# Patient Record
Sex: FEMALE | Race: WHITE | NOT HISPANIC OR LATINO | URBAN - METROPOLITAN AREA
[De-identification: names, ages, dates, MRNs, and addresses within clinical notes are randomized per-mention and may not be internally consistent; named-entity substitution may affect disease eponyms.]

---

## 2018-08-20 ENCOUNTER — INPATIENT (INPATIENT)
Facility: HOSPITAL | Age: 58
LOS: 2 days | Discharge: HOME | DRG: 871 | End: 2018-08-23
Attending: FAMILY MEDICINE | Admitting: FAMILY MEDICINE
Payer: MEDICARE

## 2018-08-20 VITALS
DIASTOLIC BLOOD PRESSURE: 74 MMHG | TEMPERATURE: 101 F | RESPIRATION RATE: 18 BRPM | SYSTOLIC BLOOD PRESSURE: 126 MMHG | HEIGHT: 69 IN | HEART RATE: 120 BPM | OXYGEN SATURATION: 96 % | WEIGHT: 134.04 LBS

## 2018-08-20 DIAGNOSIS — J98.4 OTHER DISORDERS OF LUNG: ICD-10-CM

## 2018-08-20 DIAGNOSIS — I34.1 NONRHEUMATIC MITRAL (VALVE) PROLAPSE: ICD-10-CM

## 2018-08-20 DIAGNOSIS — Z29.9 ENCOUNTER FOR PROPHYLACTIC MEASURES, UNSPECIFIED: ICD-10-CM

## 2018-08-20 DIAGNOSIS — J18.9 PNEUMONIA, UNSPECIFIED ORGANISM: ICD-10-CM

## 2018-08-20 DIAGNOSIS — Z90.49 ACQUIRED ABSENCE OF OTHER SPECIFIED PARTS OF DIGESTIVE TRACT: Chronic | ICD-10-CM

## 2018-08-20 DIAGNOSIS — A41.9 SEPSIS, UNSPECIFIED ORGANISM: ICD-10-CM

## 2018-08-20 DIAGNOSIS — G45.9 TRANSIENT CEREBRAL ISCHEMIC ATTACK, UNSPECIFIED: ICD-10-CM

## 2018-08-20 DIAGNOSIS — J22 UNSPECIFIED ACUTE LOWER RESPIRATORY INFECTION: ICD-10-CM

## 2018-08-20 LAB
ALBUMIN SERPL ELPH-MCNC: 3.3 G/DL — SIGNIFICANT CHANGE UP (ref 3.3–5)
ALP SERPL-CCNC: 115 U/L — SIGNIFICANT CHANGE UP (ref 40–120)
ALT FLD-CCNC: 21 U/L — SIGNIFICANT CHANGE UP (ref 12–78)
ANION GAP SERPL CALC-SCNC: 9 MMOL/L — SIGNIFICANT CHANGE UP (ref 5–17)
APPEARANCE UR: CLEAR — SIGNIFICANT CHANGE UP
APTT BLD: 33.3 SEC — SIGNIFICANT CHANGE UP (ref 27.5–37.4)
AST SERPL-CCNC: 24 U/L — SIGNIFICANT CHANGE UP (ref 15–37)
BACTERIA # UR AUTO: ABNORMAL
BASOPHILS # BLD AUTO: 0.05 K/UL — SIGNIFICANT CHANGE UP (ref 0–0.2)
BASOPHILS NFR BLD AUTO: 0.4 % — SIGNIFICANT CHANGE UP (ref 0–2)
BILIRUB SERPL-MCNC: 0.4 MG/DL — SIGNIFICANT CHANGE UP (ref 0.2–1.2)
BILIRUB UR-MCNC: NEGATIVE — SIGNIFICANT CHANGE UP
BUN SERPL-MCNC: 10 MG/DL — SIGNIFICANT CHANGE UP (ref 7–23)
CALCIUM SERPL-MCNC: 9.5 MG/DL — SIGNIFICANT CHANGE UP (ref 8.5–10.1)
CHLORIDE SERPL-SCNC: 99 MMOL/L — SIGNIFICANT CHANGE UP (ref 96–108)
CO2 SERPL-SCNC: 28 MMOL/L — SIGNIFICANT CHANGE UP (ref 22–31)
COLOR SPEC: YELLOW — SIGNIFICANT CHANGE UP
CREAT SERPL-MCNC: 0.96 MG/DL — SIGNIFICANT CHANGE UP (ref 0.5–1.3)
DIFF PNL FLD: ABNORMAL
EOSINOPHIL # BLD AUTO: 0.02 K/UL — SIGNIFICANT CHANGE UP (ref 0–0.5)
EOSINOPHIL NFR BLD AUTO: 0.1 % — SIGNIFICANT CHANGE UP (ref 0–6)
EPI CELLS # UR: SIGNIFICANT CHANGE UP
GLUCOSE SERPL-MCNC: 124 MG/DL — HIGH (ref 70–99)
GLUCOSE UR QL: NEGATIVE — SIGNIFICANT CHANGE UP
HCT VFR BLD CALC: 39.8 % — SIGNIFICANT CHANGE UP (ref 34.5–45)
HGB BLD-MCNC: 13.5 G/DL — SIGNIFICANT CHANGE UP (ref 11.5–15.5)
IMM GRANULOCYTES NFR BLD AUTO: 0.6 % — SIGNIFICANT CHANGE UP (ref 0–1.5)
INR BLD: 1.2 RATIO — HIGH (ref 0.88–1.16)
KETONES UR-MCNC: NEGATIVE — SIGNIFICANT CHANGE UP
LACTATE SERPL-SCNC: 0.8 MMOL/L — SIGNIFICANT CHANGE UP (ref 0.7–2)
LACTATE SERPL-SCNC: 1.5 MMOL/L — SIGNIFICANT CHANGE UP (ref 0.7–2)
LEUKOCYTE ESTERASE UR-ACNC: ABNORMAL
LYMPHOCYTES # BLD AUTO: 1 K/UL — SIGNIFICANT CHANGE UP (ref 1–3.3)
LYMPHOCYTES # BLD AUTO: 7 % — LOW (ref 13–44)
MCHC RBC-ENTMCNC: 31.6 PG — SIGNIFICANT CHANGE UP (ref 27–34)
MCHC RBC-ENTMCNC: 33.9 GM/DL — SIGNIFICANT CHANGE UP (ref 32–36)
MCV RBC AUTO: 93.2 FL — SIGNIFICANT CHANGE UP (ref 80–100)
MONOCYTES # BLD AUTO: 1.37 K/UL — HIGH (ref 0–0.9)
MONOCYTES NFR BLD AUTO: 9.6 % — SIGNIFICANT CHANGE UP (ref 2–14)
NEUTROPHILS # BLD AUTO: 11.71 K/UL — HIGH (ref 1.8–7.4)
NEUTROPHILS NFR BLD AUTO: 82.3 % — HIGH (ref 43–77)
NITRITE UR-MCNC: NEGATIVE — SIGNIFICANT CHANGE UP
NRBC # BLD: 0 /100 WBCS — SIGNIFICANT CHANGE UP (ref 0–0)
NT-PROBNP SERPL-SCNC: 390 PG/ML — HIGH (ref 0–125)
PH UR: 5 — SIGNIFICANT CHANGE UP (ref 5–8)
PLATELET # BLD AUTO: 318 K/UL — SIGNIFICANT CHANGE UP (ref 150–400)
POTASSIUM SERPL-MCNC: 4.1 MMOL/L — SIGNIFICANT CHANGE UP (ref 3.5–5.3)
POTASSIUM SERPL-SCNC: 4.1 MMOL/L — SIGNIFICANT CHANGE UP (ref 3.5–5.3)
PROCALCITONIN SERPL-MCNC: 0.05 NG/ML — HIGH (ref 0–0.04)
PROT SERPL-MCNC: 8.4 G/DL — HIGH (ref 6–8.3)
PROT UR-MCNC: 25 MG/DL
PROTHROM AB SERPL-ACNC: 13.1 SEC — HIGH (ref 9.8–12.7)
RBC # BLD: 4.27 M/UL — SIGNIFICANT CHANGE UP (ref 3.8–5.2)
RBC # FLD: 13.2 % — SIGNIFICANT CHANGE UP (ref 10.3–14.5)
RBC CASTS # UR COMP ASSIST: SIGNIFICANT CHANGE UP /HPF (ref 0–4)
SODIUM SERPL-SCNC: 136 MMOL/L — SIGNIFICANT CHANGE UP (ref 135–145)
SP GR SPEC: 1.01 — SIGNIFICANT CHANGE UP (ref 1.01–1.02)
TSH SERPL-MCNC: 0.77 UIU/ML — SIGNIFICANT CHANGE UP (ref 0.36–3.74)
UROBILINOGEN FLD QL: NEGATIVE — SIGNIFICANT CHANGE UP
WBC # BLD: 14.24 K/UL — HIGH (ref 3.8–10.5)
WBC # FLD AUTO: 14.24 K/UL — HIGH (ref 3.8–10.5)
WBC UR QL: SIGNIFICANT CHANGE UP

## 2018-08-20 PROCEDURE — 71046 X-RAY EXAM CHEST 2 VIEWS: CPT | Mod: 26

## 2018-08-20 PROCEDURE — 93010 ELECTROCARDIOGRAM REPORT: CPT

## 2018-08-20 PROCEDURE — 99285 EMERGENCY DEPT VISIT HI MDM: CPT

## 2018-08-20 PROCEDURE — 99223 1ST HOSP IP/OBS HIGH 75: CPT | Mod: GC,AI

## 2018-08-20 RX ORDER — ENOXAPARIN SODIUM 100 MG/ML
40 INJECTION SUBCUTANEOUS DAILY
Qty: 0 | Refills: 0 | Status: DISCONTINUED | OUTPATIENT
Start: 2018-08-20 | End: 2018-08-23

## 2018-08-20 RX ORDER — CEFTRIAXONE 500 MG/1
1 INJECTION, POWDER, FOR SOLUTION INTRAMUSCULAR; INTRAVENOUS ONCE
Qty: 0 | Refills: 0 | Status: COMPLETED | OUTPATIENT
Start: 2018-08-20 | End: 2018-08-20

## 2018-08-20 RX ORDER — AZITHROMYCIN 500 MG/1
500 TABLET, FILM COATED ORAL ONCE
Qty: 0 | Refills: 0 | Status: COMPLETED | OUTPATIENT
Start: 2018-08-20 | End: 2018-08-20

## 2018-08-20 RX ORDER — CEFTRIAXONE 500 MG/1
1 INJECTION, POWDER, FOR SOLUTION INTRAMUSCULAR; INTRAVENOUS EVERY 24 HOURS
Qty: 0 | Refills: 0 | Status: DISCONTINUED | OUTPATIENT
Start: 2018-08-20 | End: 2018-08-23

## 2018-08-20 RX ORDER — ACETAMINOPHEN 500 MG
650 TABLET ORAL EVERY 6 HOURS
Qty: 0 | Refills: 0 | Status: DISCONTINUED | OUTPATIENT
Start: 2018-08-20 | End: 2018-08-23

## 2018-08-20 RX ORDER — AZITHROMYCIN 500 MG/1
500 TABLET, FILM COATED ORAL EVERY 24 HOURS
Qty: 0 | Refills: 0 | Status: DISCONTINUED | OUTPATIENT
Start: 2018-08-20 | End: 2018-08-23

## 2018-08-20 RX ORDER — ACETAMINOPHEN 500 MG
650 TABLET ORAL ONCE
Qty: 0 | Refills: 0 | Status: COMPLETED | OUTPATIENT
Start: 2018-08-20 | End: 2018-08-20

## 2018-08-20 RX ORDER — ASPIRIN/CALCIUM CARB/MAGNESIUM 324 MG
81 TABLET ORAL DAILY
Qty: 0 | Refills: 0 | Status: DISCONTINUED | OUTPATIENT
Start: 2018-08-20 | End: 2018-08-23

## 2018-08-20 RX ORDER — SODIUM CHLORIDE 9 MG/ML
1000 INJECTION INTRAMUSCULAR; INTRAVENOUS; SUBCUTANEOUS ONCE
Qty: 0 | Refills: 0 | Status: COMPLETED | OUTPATIENT
Start: 2018-08-20 | End: 2018-08-20

## 2018-08-20 RX ORDER — ALBUTEROL 90 UG/1
2.5 AEROSOL, METERED ORAL EVERY 12 HOURS
Qty: 0 | Refills: 0 | Status: DISCONTINUED | OUTPATIENT
Start: 2018-08-20 | End: 2018-08-23

## 2018-08-20 RX ADMIN — SODIUM CHLORIDE 1000 MILLILITER(S): 9 INJECTION INTRAMUSCULAR; INTRAVENOUS; SUBCUTANEOUS at 18:00

## 2018-08-20 RX ADMIN — Medication 650 MILLIGRAM(S): at 17:09

## 2018-08-20 RX ADMIN — AZITHROMYCIN 500 MILLIGRAM(S): 500 TABLET, FILM COATED ORAL at 18:00

## 2018-08-20 RX ADMIN — Medication 650 MILLIGRAM(S): at 19:35

## 2018-08-20 RX ADMIN — SODIUM CHLORIDE 1000 MILLILITER(S): 9 INJECTION INTRAMUSCULAR; INTRAVENOUS; SUBCUTANEOUS at 17:09

## 2018-08-20 RX ADMIN — CEFTRIAXONE 100 GRAM(S): 500 INJECTION, POWDER, FOR SOLUTION INTRAMUSCULAR; INTRAVENOUS at 18:00

## 2018-08-20 RX ADMIN — AZITHROMYCIN 255 MILLIGRAM(S): 500 TABLET, FILM COATED ORAL at 17:09

## 2018-08-20 RX ADMIN — ALBUTEROL 2.5 MILLIGRAM(S): 90 AEROSOL, METERED ORAL at 19:41

## 2018-08-20 NOTE — H&P ADULT - HISTORY OF PRESENT ILLNESS
57 yo F with PMHx of MVP, unspecified chronic lung disease, non-hodgkin lymphoma s/p chemotherapy presents to ED due to productive cough, fever and SOB. Patient states that 5 days ago she began having chills, dry cough, tachycardia and fever (101). Patient treated her fever with tylenol q6 but other symptoms persisted despite home made prepared tea with pop, thyme and honey. Finally, today she visited her PCP who referred her to [facility] for work up of bacterial pneumonia. Currently patient admits of constant SOB, palpitations, tachycardia, myalgias, productive cough with no hemoptysis, weakness, dizziness with no syncope episodes, nausea with no vomiting and decreased appetite. Patient denies current chest pain, abdominal pain, recent weight loss or night sweats. Patient reports she gets SOB walking up 1 flight of stairs since 1 1/2 year ago, accompanied by chest pain. Chest pain     In ED vitals are T: 101  /74 RR 18 sat at 96 on RA. WBC 14.24 H/H 13.5/39.8 PT- 13.1 INR-1.20 aPTT 33.3 Procalcitonin- 0.05 Glucose 124 Tot. protein 8.4 BNP- 390. CXR 	Bilateral upper lobe volume loss with hilar retraction; cannot exclude underlying hilar mass/lymphadenopathy. Prominent bilateral reticulonodular interstitial markings may reflect chronic lung disease.  Recommend comparison with prior chest x-ray.

## 2018-08-20 NOTE — H&P ADULT - PROBLEM SELECTOR PLAN 5
TIA as per history 3 years ago   ASA 81 mg chronic, stable  TIA as per history 3 years ago   ASA 81 mg

## 2018-08-20 NOTE — H&P ADULT - NSHPOUTPATIENTPROVIDERS_GEN_ALL_CORE
Pulmonologist: Dr. Bettye Hazel   PCP: Dr. Josef Ventura   Cardiologist: Dr. Gadiel Ventura Pulmonologist: Dr. Bettye Hazel   PCP: Dr. Joselito Lemon   Cardiologist: Dr. Gadiel Ventura

## 2018-08-20 NOTE — H&P ADULT - PROBLEM SELECTOR PLAN 1
Sepsis likely 2/2 to community acquired pneumonia   WBC 14.24 Pro-calcitonin 0.05 Fever 101 , abnormal CXR   CBC w/ diff   FU BC, UC, sputum culture, strep ag and legionella ag, lactate    Continue empiric antibiotics with Rochephin and Zithro IV   Admit to MED   Monitor vs and HD and Sat  Tylenol PRN fever   Fall precautions   Consult ID

## 2018-08-20 NOTE — H&P ADULT - ASSESSMENT
59 yo F with PMHx of MVP, unspecified chronic lung disease, non-hodgkin lymphoma s/p chemotherapy, TIA 3 years ago presents to ED due to productive cough, fever and SOB. 57 yo F with PMHx of MVP, unspecified chronic lung disease, non-hodgkin lymphoma s/p chemotherapy, TIA 3 years ago presents to ED due to productive cough, fever and SOB admitted with sepsis sec to CAP and chest pain r/o acs.

## 2018-08-20 NOTE — ED ADULT NURSE NOTE - NSIMPLEMENTINTERV_GEN_ALL_ED
Implemented All Universal Safety Interventions:  [place] to call system. Call bell, personal items and telephone within reach. Instruct patient to call for assistance. Room bathroom lighting operational. Non-slip footwear when patient is off stretcher. Physically safe environment: no spills, clutter or unnecessary equipment. Stretcher in lowest position, wheels locked, appropriate side rails in place.

## 2018-08-20 NOTE — H&P ADULT - NSHPPHYSICALEXAM_GEN_ALL_CORE
Vital Signs Last 24 Hrs  T(C): 37.2 (20 Aug 2018 19:59), Max: 38.3 (20 Aug 2018 15:54)  T(F): 99 (20 Aug 2018 19:59), Max: 101 (20 Aug 2018 15:54)  HR: 97 (20 Aug 2018 19:59) (97 - 120)  BP: 127/62 (20 Aug 2018 19:59) (112/74 - 127/62)  BP(mean): --  RR: 18 (20 Aug 2018 19:59) (18 - 20)  SpO2: 95% (20 Aug 2018 19:59) (95% - 96%)  Physical Exam:  General: Not in acute distress, appeared somnolent.  HEENT: NCAT, PERRLA, EOMI bl, moist mucous membranes   Neck: Supple, nontender, no mass  Neurology: A&Ox3, nonfocal, CN II-XII grossly intact  Respiratory: Decreased breath sound in lower lung fields   CV: RRR, +S1/S2, no murmurs, rubs or gallops  Abdominal: Soft, NT, ND +BSx4  Extremities: No C/C/E, + peripheral pulses  Skin: warm, dry, normal color Vital Signs Last 24 Hrs  T(C): 37.2 (20 Aug 2018 19:59), Max: 38.3 (20 Aug 2018 15:54)  T(F): 99 (20 Aug 2018 19:59), Max: 101 (20 Aug 2018 15:54)  HR: 97 (20 Aug 2018 19:59) (97 - 120)  BP: 127/62 (20 Aug 2018 19:59) (112/74 - 127/62)  BP(mean): --  RR: 18 (20 Aug 2018 19:59) (18 - 20)  SpO2: 95% (20 Aug 2018 19:59) (95% - 96%)  Physical Exam:  General: Not in acute distress, appears slightly somnolent  HEENT: NCAT, PERRLA, EOMI bl, moist mucous membranes   Neck: Supple, nontender, no mass  Neurology: A&Ox3, nonfocal, CN II-XII grossly intact  Respiratory: Decreased breath sound in lower lung fields   CV: RRR, +S1/S2, no murmurs, rubs or gallops  Abdominal: Soft, NT, ND +BSx4  Extremities: No C/C/E, + peripheral pulses  Skin: warm, dry, normal color

## 2018-08-20 NOTE — H&P ADULT - PMH
Mitral prolapse    Pulmonary disease Mitral prolapse    Pulmonary disease    Transient cerebral ischemia, unspecified type

## 2018-08-20 NOTE — ED PROVIDER NOTE - OBJECTIVE STATEMENT
pt referred by pmd for 5 days of sob, cough and fever. no ha, cp, abd pain, d/n/v, edema, calf pain, travel  pmd - last  pul - [facility]

## 2018-08-20 NOTE — ED ADULT NURSE NOTE - OBJECTIVE STATEMENT
pt to ER with co cough and fever. lungs diminished with nonproductive cough. skin warm and dry pt reports ongoing pulmonary symptoms and is being followed with pulmonologist. O2 sat WNL family at bedside comfort and safety ensured will continue to monitor

## 2018-08-20 NOTE — CONSULT NOTE ADULT - SUBJECTIVE AND OBJECTIVE BOX
Date/Time Patient Seen:  		  Referring MD:   Data Reviewed	       Patient is a 58y old  Female who presents with a chief complaint of     Subjective/HPI    vs and meds reviewed  fever  cough  sob  aleman  pt has chronic lung disease, cannot elaborate  pt sees Dr. Bettye Hazel in [place] for her chronic lung disease, she has been seeing a pulm specialist for a year, but is not certain of her diagnosis  pt has hx of NHL - cured as per patient, s/p Chemo, never radiated    lives at home with  and daughter    ex smoker    non drinker     ED Provider Note [Charted Location: \[facility]\"" ED] [Authored: 20-Aug-2018 16:26]- for Visit: [id number], Complete, Revised, Signed in Full, General    HISTORY OF PRESENT ILLNESS:    High Risk Travel:  International Travel? No(1)    · Chief Complaint: The patient is a 58y Female complaining of cough.  · HPI Objective Statement: pt referred by pmd for 5 days of sob, cough and fever. no ha, cp, abd pain, d/n/v, edema, calf pain, travel  	pmd - last  pulm - [facility]  · Presenting Symptoms: COUGH, FEVER, SHORTNESS OF BREATH  · Negative Findings: no chest pain, no chills, no edema, no headache, no wheezing  · Highest Temperature: fahrenheit  101.5  · Timing: constant  · Duration: day(s)  5  · Quality: alteration in breathing pattern, productive cough       PAST MEDICAL & SURGICAL HISTORY:  Mitral prolapse  Pulmonary disease        Medication list         MEDICATIONS  (STANDING):  ALBUTerol    0.083% 2.5 milliGRAM(s) Nebulizer every 12 hours  cefTRIAXone   IVPB 1 Gram(s) IV Intermittent Once    MEDICATIONS  (PRN):  acetaminophen   Tablet 650 milliGRAM(s) Oral every 6 hours PRN For Temp greater than 38 C (100.4 F)  guaiFENesin  milliGRAM(s) Oral every 12 hours PRN Cough         Vitals log        ICU Vital Signs Last 24 Hrs  T(C): 38.3 (20 Aug 2018 15:54), Max: 38.3 (20 Aug 2018 15:54)  T(F): 101 (20 Aug 2018 15:54), Max: 101 (20 Aug 2018 15:54)  HR: 120 (20 Aug 2018 15:54) (120 - 120)  BP: 126/74 (20 Aug 2018 15:54) (126/74 - 126/74)  BP(mean): --  ABP: --  ABP(mean): --  RR: 18 (20 Aug 2018 15:54) (18 - 18)  SpO2: 96% (20 Aug 2018 15:54) (96% - 96%)           Input and Output:  I&O's Detail      Lab Data                        13.5   14.24 )-----------( 318      ( 20 Aug 2018 16:44 )             39.8     08-20    136  |  99  |  10  ----------------------------<  124<H>  4.1   |  28  |  0.96    Ca    9.5      20 Aug 2018 16:44    TPro  8.4<H>  /  Alb  3.3  /  TBili  0.4  /  DBili  x   /  AST  24  /  ALT  21  /  AlkPhos  115  08-20            Review of Systems	      Objective     Physical Examination    heart s1s2  lung dec BS  abd soft  cn grossly int  room air sat 94 pct      Pertinent Lab findings & Imaging      Shauna:  NO   Adequate UO     I&O's Detail           Discussed with:     Cultures:	        Radiology

## 2018-08-20 NOTE — H&P ADULT - PROBLEM SELECTOR PLAN 3
Unspecified Chronic lung disease followed by Dr. Bettye Hazel in [facility] call for old records   Abx, nebs, o2 as needed, keep sat > 88 pct  Incentive spirometry   Pneumologist recs appreciated

## 2018-08-20 NOTE — CONSULT NOTE ADULT - PROBLEM SELECTOR RECOMMENDATION 9
lower resp tract infection  leukocytosis and abnormal CXR plus fever and cough  emp ABX  Rocephin and Zithro IV  will add Albuterol NEBS BID for mucociliary clearance  tylenol PRN for fever  Mucinex PRN for cough expectoration  monitor vs and HD and Sat  will check strep ag and legionella ag, crp and procalcitonin and sputum Cx

## 2018-08-20 NOTE — H&P ADULT - FAMILY HISTORY
Mother  Still living? Unknown  Family history of breast cancer, Age at diagnosis: Age Unknown     Father  Still living? No  Family history of stroke, Age at diagnosis: Age Unknown

## 2018-08-20 NOTE — ED PROVIDER NOTE - CARE PLAN
Principal Discharge DX:	Fever  Secondary Diagnosis:	Cough Principal Discharge DX:	PNA (pneumonia)  Secondary Diagnosis:	Cough

## 2018-08-20 NOTE — H&P ADULT - PROBLEM SELECTOR PLAN 6
IMPROVE VTE Individual Risk Assessment        RISK                                                          Points  [  ] Previous VTE                                                3  [  ] Thrombophilia                                             2  [  ] Lower limb paralysis                                   2        (unable to hold up >15 seconds)    [  ] Current Cancer                                            2         (within 6 months)  [  ] Immobilization > 24 hrs                              1  [  ] ICU/CCU stay > 24 hours                            1  [ x ] Age > 60                                                    1  IMPROVE VTE Score 1   Lovenox 40 mg sub q daily for DVT prophylaxis  Fall precautions and ambulate with assistance

## 2018-08-20 NOTE — H&P ADULT - NSHPREVIEWOFSYSTEMS_GEN_ALL_CORE
Constitutional: denies fever, chills, diaphoresis   HEENT: denies blurry vision, difficulty hearing  Respiratory: denies SOB, DARNELL, cough, sputum production, wheezing, hemoptysis  Cardiovascular: denies chest pain, palpitations, edema  Gastrointestinal: denies nausea, vomiting, diarrhea, constipation, abdominal pain, melena, hematochezia   Genitourinary: denies dysuria, frequency, urgency, hematuria   Skin/Breast: denies rash, itching  Musculoskeletal: denies myalgias, joint swelling, muscle weakness  Neurologic: denies headache, weakness, dizziness, paresthesias, numbness/tingling  Psychiatric: denies feeling anxious, depressed, suicidal, homicidal thoughts  Hematology/Oncology: denies bruising, tender or enlarged lymph nodes   ROS negative except as noted above Constitutional: admits fever, chills, denies night sweats   HEENT: denies blurry vision, difficulty hearing  Respiratory: Admits SOB, cough, sputum production, wheezing, hemoptysis  Cardiovascular: Admits chest pain with excretion, palpitations  Gastrointestinal: Admits nausea, no vomiting, diarrhea, constipation, abdominal pain, melena, hematochezia   Genitourinary: denies dysuria, frequency, urgency, hematuria   Skin/Breast: denies rash, itching  Musculoskeletal: Admits myalgias, denies joint swelling, muscle weakness  Neurologic: Admits weakness, dizziness, denies paresthesias, numbness/tingling  Psychiatric: denies feeling anxious Constitutional: admits fever, chills, denies night sweats   HEENT: denies blurry vision, difficulty hearing  Respiratory: Admits SOB, cough, sputum production, wheezing, hemoptysis  Cardiovascular: Admits chest pain with exertion, palpitations  Gastrointestinal: Admits nausea, no vomiting, diarrhea, constipation, abdominal pain, melena, hematochezia   Genitourinary: denies dysuria, frequency, urgency, hematuria   Skin/Breast: denies rash, itching  Musculoskeletal: Admits myalgias, denies joint swelling, muscle weakness  Neurologic: Admits weakness, dizziness, denies paresthesias, numbness/tingling  Psychiatric: denies feeling anxious

## 2018-08-20 NOTE — H&P ADULT - ATTENDING COMMENTS
59 yo F with PMHx of MVP, unspecified chronic lung disease, non-hodgkin lymphoma s/p chemotherapy, TIA 3 years ago presents to ED due to productive cough, fever and SOB admitted with sepsis sec to CAP and chest pain r/o acs.  Plan: monitor on remote tele, check ce, apprec pulm and cardio recs, f/u echo, monitor clinical course, cont iv antibx, aspiration precautions, fall precautions

## 2018-08-20 NOTE — CONSULT NOTE ADULT - PROBLEM SELECTOR RECOMMENDATION 2
pt has chronic lung disease, abnormal CXR and sees Dr. Bettye Hazel ILD expert in [facility]  will need to call for old records, pt also had CT chest in the past at [facility], she is apprehensive about getting CT chest in the ER today  pt does not know much detail about her chronic lung disease  will follow  supportive medical regimen, Abx, nebs, o2 as needed, keep sat > 88 pct  discussed with pt and family

## 2018-08-20 NOTE — H&P ADULT - PROBLEM SELECTOR PLAN 2
Acute Lower respiratory tract infection likely 2/2 community acquired pneumonia  CBC w/ diff AM  Albuterol NEBS BID for mucociliary clearance  Mucinex PRN for cough expectoration  Continue abx as plan above   Aspiration precautions   Follow Pneumologist recs

## 2018-08-20 NOTE — H&P ADULT - PROBLEM SELECTOR PLAN 4
Unstable MVP- SOB upon climbing 1 flight of stairs accompanied by chest pain   Consult cardiology- thee chronic, Unstable MVP- SOB upon climbing 1 flight of stairs accompanied by chest pain   will check ce, ekg and echo  apprec Consult cardiology- Dr Pierre

## 2018-08-20 NOTE — H&P ADULT - NSHPSOCIALHISTORY_GEN_ALL_CORE
, lives at home with  and daughter. Denies, smoking, ETOH use or substance abuse. Ambulates with assistance.

## 2018-08-21 ENCOUNTER — TRANSCRIPTION ENCOUNTER (OUTPATIENT)
Age: 58
End: 2018-08-21

## 2018-08-21 LAB
BASOPHILS # BLD AUTO: 0.04 K/UL — SIGNIFICANT CHANGE UP (ref 0–0.2)
BASOPHILS NFR BLD AUTO: 0.3 % — SIGNIFICANT CHANGE UP (ref 0–2)
CK MB BLD-MCNC: <0.9 % — SIGNIFICANT CHANGE UP (ref 0–3.5)
CK MB CFR SERPL CALC: <1 NG/ML — SIGNIFICANT CHANGE UP (ref 0–3.6)
CK SERPL-CCNC: 117 U/L — SIGNIFICANT CHANGE UP (ref 26–192)
CRP SERPL-MCNC: 20.68 MG/DL — HIGH (ref 0–0.4)
CULTURE RESULTS: NO GROWTH — SIGNIFICANT CHANGE UP
EOSINOPHIL # BLD AUTO: 0.04 K/UL — SIGNIFICANT CHANGE UP (ref 0–0.5)
EOSINOPHIL NFR BLD AUTO: 0.3 % — SIGNIFICANT CHANGE UP (ref 0–6)
HCT VFR BLD CALC: 35.2 % — SIGNIFICANT CHANGE UP (ref 34.5–45)
HGB BLD-MCNC: 11.7 G/DL — SIGNIFICANT CHANGE UP (ref 11.5–15.5)
IMM GRANULOCYTES NFR BLD AUTO: 0.8 % — SIGNIFICANT CHANGE UP (ref 0–1.5)
LYMPHOCYTES # BLD AUTO: 1.28 K/UL — SIGNIFICANT CHANGE UP (ref 1–3.3)
LYMPHOCYTES # BLD AUTO: 10.8 % — LOW (ref 13–44)
MCHC RBC-ENTMCNC: 31.2 PG — SIGNIFICANT CHANGE UP (ref 27–34)
MCHC RBC-ENTMCNC: 33.2 GM/DL — SIGNIFICANT CHANGE UP (ref 32–36)
MCV RBC AUTO: 93.9 FL — SIGNIFICANT CHANGE UP (ref 80–100)
MONOCYTES # BLD AUTO: 1.33 K/UL — HIGH (ref 0–0.9)
MONOCYTES NFR BLD AUTO: 11.2 % — SIGNIFICANT CHANGE UP (ref 2–14)
NEUTROPHILS # BLD AUTO: 9.11 K/UL — HIGH (ref 1.8–7.4)
NEUTROPHILS NFR BLD AUTO: 76.6 % — SIGNIFICANT CHANGE UP (ref 43–77)
PLATELET # BLD AUTO: 284 K/UL — SIGNIFICANT CHANGE UP (ref 150–400)
RAPID RVP RESULT: SIGNIFICANT CHANGE UP
RBC # BLD: 3.75 M/UL — LOW (ref 3.8–5.2)
RBC # FLD: 13 % — SIGNIFICANT CHANGE UP (ref 10.3–14.5)
SPECIMEN SOURCE: SIGNIFICANT CHANGE UP
TROPONIN I SERPL-MCNC: <.015 NG/ML — SIGNIFICANT CHANGE UP (ref 0.01–0.04)
WBC # BLD: 11.9 K/UL — HIGH (ref 3.8–10.5)
WBC # FLD AUTO: 11.9 K/UL — HIGH (ref 3.8–10.5)

## 2018-08-21 PROCEDURE — 99233 SBSQ HOSP IP/OBS HIGH 50: CPT | Mod: GC

## 2018-08-21 PROCEDURE — 71250 CT THORAX DX C-: CPT | Mod: 26

## 2018-08-21 RX ORDER — ACETAMINOPHEN 500 MG
650 TABLET ORAL ONCE
Qty: 0 | Refills: 0 | Status: COMPLETED | OUTPATIENT
Start: 2018-08-21 | End: 2018-08-21

## 2018-08-21 RX ORDER — LACTOBACILLUS ACIDOPHILUS 100MM CELL
1 CAPSULE ORAL
Qty: 0 | Refills: 0 | Status: DISCONTINUED | OUTPATIENT
Start: 2018-08-21 | End: 2018-08-23

## 2018-08-21 RX ORDER — SODIUM CHLORIDE 9 MG/ML
1000 INJECTION INTRAMUSCULAR; INTRAVENOUS; SUBCUTANEOUS
Qty: 0 | Refills: 0 | Status: DISCONTINUED | OUTPATIENT
Start: 2018-08-21 | End: 2018-08-23

## 2018-08-21 RX ORDER — ONDANSETRON 8 MG/1
4 TABLET, FILM COATED ORAL ONCE
Qty: 0 | Refills: 0 | Status: COMPLETED | OUTPATIENT
Start: 2018-08-21 | End: 2018-08-21

## 2018-08-21 RX ADMIN — Medication 650 MILLIGRAM(S): at 01:29

## 2018-08-21 RX ADMIN — AZITHROMYCIN 255 MILLIGRAM(S): 500 TABLET, FILM COATED ORAL at 17:42

## 2018-08-21 RX ADMIN — Medication 650 MILLIGRAM(S): at 13:05

## 2018-08-21 RX ADMIN — CEFTRIAXONE 100 GRAM(S): 500 INJECTION, POWDER, FOR SOLUTION INTRAMUSCULAR; INTRAVENOUS at 17:03

## 2018-08-21 RX ADMIN — Medication 650 MILLIGRAM(S): at 21:08

## 2018-08-21 RX ADMIN — ONDANSETRON 4 MILLIGRAM(S): 8 TABLET, FILM COATED ORAL at 21:08

## 2018-08-21 RX ADMIN — SODIUM CHLORIDE 100 MILLILITER(S): 9 INJECTION INTRAMUSCULAR; INTRAVENOUS; SUBCUTANEOUS at 12:19

## 2018-08-21 NOTE — DISCHARGE NOTE ADULT - CARE PLAN
Principal Discharge DX:	PNA (pneumonia) Principal Discharge DX:	PNA (pneumonia)  Goal:	Improvement  Assessment and plan of treatment:	You were given IV antibiotics  You were treated symptomatically with Albuterol and Robitussin Principal Discharge DX:	PNA (pneumonia)  Goal:	Improvement  Assessment and plan of treatment:	-You were given IV antibiotics  -You were treated symptomatically with Albuterol and Robitussin.  -Continue Antibiotics orally as prescribed  -Continue home remedies  -Continue to drink lots of water  -Continue probiotics  -Continue good sleep hygiene: No screens an hour before bed, keep the bedroom slightly cool and quiet, keep bedroom for sleeping and intimate activities only.  Secondary Diagnosis:	Mitral prolapse  Goal:	Controlled  Assessment and plan of treatment:	Follow up with your cardiologist Dr. Ventura in 1-2 weeks and possibly obtain an echocardiogram.  Secondary Diagnosis:	Chronic lung disease  Goal:	Controlled  Assessment and plan of treatment:	Follow up with your Pulmonologist Dr. Hazel in 1-2 weeks Principal Discharge DX:	PNA (pneumonia)  Goal:	Improvement  Assessment and plan of treatment:	-You were given IV antibiotics  -You were treated symptomatically with Albuterol and Robitussin.  -Continue Antibiotics orally as prescribed  -Continue home remedies  -Continue to drink lots of water  -Continue probiotics  -Continue good sleep hygiene: No screens an hour before bed, keep the bedroom slightly cool and quiet, keep bedroom for sleeping and intimate activities only.  Secondary Diagnosis:	Mitral prolapse  Goal:	Controlled  Assessment and plan of treatment:	Follow up with your cardiologist Dr. Ventura in 1-2 weeks for outpatient continuous holter monitor. May need outpatient repeat echocardiogram in 3-6 months post hospitalization.  Secondary Diagnosis:	Chronic lung disease  Goal:	Controlled  Assessment and plan of treatment:	Follow up with your Pulmonologist Dr. Hazel in 1-2 weeks Principal Discharge DX:	PNA (pneumonia)  Goal:	Improvement  Assessment and plan of treatment:	-You were given IV antibiotics  -You were treated symptomatically with Albuterol and Robitussin.  -Continue Antibiotics orally as prescribed  -Continue home remedies  -Continue to drink lots of water  -Continue probiotics  -Continue good sleep hygiene: No screens an hour before bed, keep the bedroom slightly cool and quiet, keep bedroom for sleeping and intimate activities only.  -If your fever persists after 7 days, or your symptoms get progressively worse, return to a hospital for further treatment.  Secondary Diagnosis:	Mitral prolapse  Goal:	Controlled  Assessment and plan of treatment:	Follow up with your cardiologist Dr. Ventura in 1-2 weeks for outpatient continuous holter monitor. May need outpatient repeat echocardiogram in 3-6 months post hospitalization.  Secondary Diagnosis:	Chronic lung disease  Goal:	Controlled  Assessment and plan of treatment:	Follow up with your Pulmonologist Dr. Hazel in 1-2 weeks. Principal Discharge DX:	PNA (pneumonia)  Goal:	Improvement  Assessment and plan of treatment:	s/p iv abx now dc on po abx   -You were treated symptomatically with Albuterol and Robitussin.  -Continue Antibiotics orally   -Continue to drink lots of water  -Continue probiotics  Secondary Diagnosis:	Mitral prolapse  Goal:	Controlled  Assessment and plan of treatment:	Follow up with your cardiologist Dr. Ventura in 1-for outpatient  need event holter monitor. May need outpatient repeat echocardiogram in 3-6 months post hospitalization.  Secondary Diagnosis:	Chronic lung disease  Goal:	Controlled  Assessment and plan of treatment:	Follow up with your Pulmonologist Dr. Hazel in 1-2 weeks. need further work for bl  pulm nodule and pleural thickening.

## 2018-08-21 NOTE — CONSULT NOTE ADULT - ASSESSMENT
58 F a/w cough, fever, SOB and chest discomfort in the setting of a hx of MVP and TIA    Suggest:  1. 58 F a/w cough, fever, SOB and chest discomfort in the setting of a hx of MVP and TIA.  Likely acute Respiratory infection now on IV abx feeling a little better. Doubt ACS/CHF/arrhythmia    Suggest:  1. Agree with asa 81mg daily and DVT prophylaxis  2. Get echocardiogram  3. Normally follows with my associate Dr. Ventura but has not seen him in > 1 year.  4. Check second troponin.  5. Abx per Pulm  6. Will follow.

## 2018-08-21 NOTE — CONSULT NOTE ADULT - SUBJECTIVE AND OBJECTIVE BOX
[facility] Cardiology Consult - Juan Sommer Ishmael Pierre [phone]    CHIEF COMPLAINT: Patient is a 58y old  Female who presents with a chief complaint of Cough, fever and SOB (20 Aug 2018 19:44)      HPI:  59 yo F with PMHx of MVP, unspecified chronic lung disease, non-hodgkin lymphoma s/p chemotherapy presents to ED due to productive cough, fever and SOB. Patient states that 5 days ago she began having chills, dry cough, tachycardia and fever (101). Patient treated her fever with tylenol q6 but other symptoms persisted despite home made prepared tea with pop, thyme and honey. Finally, today she visited her PCP who referred her to [facility] for work up of bacterial pneumonia. Currently patient admits of constant SOB, palpitations, tachycardia, myalgias, productive cough with no hemoptysis, weakness, dizziness with no syncope episodes, nausea with no vomiting and decreased appetite. Patient denies current chest pain, abdominal pain, recent weight loss or night sweats. Patient reports she gets SOB walking up 1 flight of stairs since 1 1/2 year ago, accompanied by chest pain. Chest pain     In ED vitals are T: 101  /74 RR 18 sat at 96 on RA. WBC 14.24 H/H 13.5/39.8 PT- 13.1 INR-1.20 aPTT 33.3 Procalcitonin- 0.05 Glucose 124 Tot. protein 8.4 BNP- 390. CXR 	Bilateral upper lobe volume loss with hilar retraction; cannot exclude underlying hilar mass/lymphadenopathy. Prominent bilateral reticulonodular interstitial markings may reflect chronic lung disease.  Recommend comparison with prior chest x-ray. (20 Aug 2018 19:44)      PAST MEDICAL & SURGICAL HISTORY:  Transient cerebral ischemia, unspecified type  Mitral prolapse  Pulmonary disease  History of appendectomy      SOCIAL HISTORY: Alochol: Denied  Smoking: Nonsmoker  Drug Use: Denied  Marital Status:         FAMILY HISTORY: FAMILY HISTORY:  Family history of stroke (Father)  Family history of breast cancer (Mother): Mother      MEDICATIONS  (STANDING):  ALBUTerol    0.083% 2.5 milliGRAM(s) Nebulizer every 12 hours  aspirin enteric coated 81 milliGRAM(s) Oral daily  azithromycin  IVPB 500 milliGRAM(s) IV Intermittent every 24 hours  cefTRIAXone   IVPB 1 Gram(s) IV Intermittent every 24 hours  enoxaparin Injectable 40 milliGRAM(s) SubCutaneous daily    MEDICATIONS  (PRN):  acetaminophen   Tablet 650 milliGRAM(s) Oral every 6 hours PRN For Temp greater than 38 C (100.4 F)  guaiFENesin  milliGRAM(s) Oral every 12 hours PRN Cough      Allergies    shellfish. (Anaphylaxis)    Intolerances        REVIEW OF SYSTEMS:    CONSTITUTIONAL: No weakness, fevers or chills  EYES/ENT: No visual changes;  No vertigo or throat pain   NECK: No pain or stiffness  RESPIRATORY: No cough, wheezing, hemoptysis; No shortness of breath  CARDIOVASCULAR: No chest pain or palpitations  GASTROINTESTINAL: No abdominal pain. No nausea, vomiting, or hematemesis; No diarrhea or constipation. No melena or hematochezia.  GENITOURINARY: No dysuria, frequency or hematuria  NEUROLOGICAL: No numbness or weakness  SKIN: No itching or rash  All other review of systems is negative unless indicated above    VITAL SIGNS:   Vital Signs Last 24 Hrs  T(C): 36.5 (21 Aug 2018 05:19), Max: 38.3 (20 Aug 2018 15:54)  T(F): 97.7 (21 Aug 2018 05:19), Max: 101 (20 Aug 2018 15:54)  HR: 86 (21 Aug 2018 05:19) (86 - 120)  BP: 98/64 (21 Aug 2018 05:19) (98/64 - 127/62)  BP(mean): --  RR: 18 (21 Aug 2018 05:19) (16 - 20)  SpO2: 93% (21 Aug 2018 05:19) (93% - 99%)    I&O's Summary    20 Aug 2018 07:01  -  21 Aug 2018 07:00  --------------------------------------------------------  IN: 1000 mL / OUT: 0 mL / NET: 1000 mL        PHYSICAL EXAM:  Constitutional: Awake in NAD  HEENT:  SANTOS, EOMI  Neck: No JVD  Pulmonary: CTA B/L No R/R/W  Cardiovascular: PMI not palpable non-displaced Regular S1 and S2, no murmurs, rubs, gallops or clicks  Gastrointestinal: Bowel Sounds present, soft, nontender.   Extremities: Trace peripheral edema.   Neuro: No gross focal deficits    LABS: All Labs Reviewed:                        11.7   11.90 )-----------( 284      ( 21 Aug 2018 06:35 )             35.2                         13.5   14.24 )-----------( 318      ( 20 Aug 2018 16:44 )             39.8     20 Aug 2018 16:44    136    |  99     |  10     ----------------------------<  124    4.1     |  28     |  0.96     Ca    9.5        20 Aug 2018 16:44    TPro  8.4    /  Alb  3.3    /  TBili  0.4    /  DBili  x      /  AST  24     /  ALT  21     /  AlkPhos  115    20 Aug 2018 16:44    PT/INR - ( 20 Aug 2018 16:44 )   PT: 13.1 sec;   INR: 1.20 ratio         PTT - ( 20 Aug 2018 16:44 )  PTT:33.3 sec  CARDIAC MARKERS ( 21 Aug 2018 06:35 )  <.015 ng/mL / x     / 117 U/L / x     / x          Blood Culture:   08-20 @ 17:59  Pro Bnp 390    08-20 @ 17:59  TSH: 0.77      RADIOLOGY/EKG: [facility] Cardiology Consult - Juan Sommer Ishmael Pierre [phone]    CHIEF COMPLAINT: Patient is a 58y old  Female who presents with a chief complaint of Cough, fever and SOB (20 Aug 2018 19:44)      HPI:  59 yo F with PMHx of MVP, Meniere's disease, unspecified chronic lung disease treated by Dr. Irina Cruz at [facility], non-hodgkin lymphoma 15 years ago tx with chemotherapy presents to ED due to productive cough, fever and SOB. Patient states that 5 days ago she began having chills, dry cough, tachycardia and recurrent fevers (101). Patient treated her fever with tylenol q6 but other symptoms persisted despite home made prepared tea with pop, thyme and honey. Finally, today she visited her PCP who referred her to [facility] for work up of bacterial pneumonia. Currently patient admits of constant SOB, palpitations, tachycardia, myalgias, productive cough with no hemoptysis, weakness, dizziness with no syncope episodes, nausea with no vomiting and decreased appetite. Patient denies current chest pain, abdominal pain, recent weight loss or night sweats. Patient reports she gets SOB walking up 1 flight of stairs since 1 1/2 year ago, accompanied by chest pain. Chest pain     In ED vitals are T: 101  /74 RR 18 sat at 96 on RA. WBC 14.24 H/H 13.5/39.8 PT- 13.1 INR-1.20 aPTT 33.3 Procalcitonin- 0.05 Glucose 124 Tot. protein 8.4 BNP- 390. CXR 	Bilateral upper lobe volume loss with hilar retraction; cannot exclude underlying hilar mass/lymphadenopathy. Prominent bilateral reticulonodular interstitial markings may reflect chronic lung disease.  Recommend comparison with prior chest x-ray. (20 Aug 2018 19:44)    Daughter slept at bedside with her     PAST MEDICAL & SURGICAL HISTORY:  Transient cerebral ischemia, unspecified type  Mitral prolapse  Pulmonary disease  History of appendectomy      SOCIAL HISTORY: Alochol: Denied  Smoking: Nonsmoker  Drug Use: Denied  Marital Status:         FAMILY HISTORY: FAMILY HISTORY:  Family history of stroke (Father)  Family history of breast cancer (Mother): Mother      MEDICATIONS  (STANDING):  ALBUTerol    0.083% 2.5 milliGRAM(s) Nebulizer every 12 hours  aspirin enteric coated 81 milliGRAM(s) Oral daily  azithromycin  IVPB 500 milliGRAM(s) IV Intermittent every 24 hours  cefTRIAXone   IVPB 1 Gram(s) IV Intermittent every 24 hours  enoxaparin Injectable 40 milliGRAM(s) SubCutaneous daily    MEDICATIONS  (PRN):  acetaminophen   Tablet 650 milliGRAM(s) Oral every 6 hours PRN For Temp greater than 38 C (100.4 F)  guaiFENesin  milliGRAM(s) Oral every 12 hours PRN Cough      Allergies    shellfish. (Anaphylaxis)    Intolerances        REVIEW OF SYSTEMS:    CONSTITUTIONAL: Pos weakness, fevers and chills  EYES/ENT: No visual changes;  No vertigo or throat pain   NECK: No pain or stiffness  RESPIRATORY: Pos cough, wheezing; Positive shortness of breath  CARDIOVASCULAR: Pos chest pain. No palpitations  GASTROINTESTINAL: No abdominal pain. No nausea, vomiting, or hematemesis; No diarrhea or constipation. No melena or hematochezia.  GENITOURINARY: No dysuria, frequency or hematuria  NEUROLOGICAL: No numbness or weakness  SKIN: No itching or rash    All other review of systems is negative unless indicated above    VITAL SIGNS:   Vital Signs Last 24 Hrs  T(C): 36.5 (21 Aug 2018 05:19), Max: 38.3 (20 Aug 2018 15:54)  T(F): 97.7 (21 Aug 2018 05:19), Max: 101 (20 Aug 2018 15:54)  HR: 86 (21 Aug 2018 05:19) (86 - 120)  BP: 98/64 (21 Aug 2018 05:19) (98/64 - 127/62)  BP(mean): --  RR: 18 (21 Aug 2018 05:19) (16 - 20)  SpO2: 93% (21 Aug 2018 05:19) (93% - 99%)    I&O's Summary    20 Aug 2018 07:01  -  21 Aug 2018 07:00  --------------------------------------------------------  IN: 1000 mL / OUT: 0 mL / NET: 1000 mL        PHYSICAL EXAM:  Constitutional: Awake in NAD  HEENT:  SANTOS, EOMI  Neck: No JVD  Pulmonary: Rales/Rhonchi bilaterally  Cardiovascular: PMI not palpable non-displaced Regular S1 and S2, no murmurs  Gastrointestinal: Bowel Sounds present, soft, nontender.   Extremities: No significant peripheral edema.   Neuro: No gross focal deficits    LABS: All Labs Reviewed:                        11.7   11.90 )-----------( 284      ( 21 Aug 2018 06:35 )             35.2                         13.5   14.24 )-----------( 318      ( 20 Aug 2018 16:44 )             39.8     20 Aug 2018 16:44    136    |  99     |  10     ----------------------------<  124    4.1     |  28     |  0.96     Ca    9.5        20 Aug 2018 16:44    TPro  8.4    /  Alb  3.3    /  TBili  0.4    /  DBili  x      /  AST  24     /  ALT  21     /  AlkPhos  115    20 Aug 2018 16:44    PT/INR - ( 20 Aug 2018 16:44 )   PT: 13.1 sec;   INR: 1.20 ratio         PTT - ( 20 Aug 2018 16:44 )  PTT:33.3 sec  CARDIAC MARKERS ( 21 Aug 2018 06:35 )  <.015 ng/mL / x     / 117 U/L / x     / x          Blood Culture:   08-20 @ 17:59  Pro Bnp 390    08-20 @ 17:59  TSH: 0.77      RADIOLOGY/EKG:  < from: Xray Chest 2 Views PA/Lat (08.20.18 @ 16:27) >  Impression:    Bilateral upper lobe volume loss with hilar retraction; cannot exclude   underlying hilar mass/lymphadenopathy.  Prominent bilateral reticulonodular interstitial markings may reflect   chronic lung disease.  Recommendcomparison with prior chest x-ray. If prior chest x-ray is not   available for direct comparison, recommend further evaluation with   contrast enhanced chest CT scan.    < end of copied text >  ECG is Sinus tachycardia at 104 bpm with no acute ST T changes

## 2018-08-21 NOTE — CONSULT NOTE ADULT - ASSESSMENT
History consistent with pneumonia, but chest exam unimpressive by auscultation. CXR very abnormal, but suspect most of the changes seen are chronic in nature. Patient amenable now to CT Chest so long as it does not have IV contrast.    No recent antibiotics. History of hymphoma distantly, but no concern of active immune compromising condition or medication at the present time. No exotic travel.     Suggestions--  Continue ceftriaxone  Continue azithromycin  F/U legionella urinary Ag  F/U blood cx data  Monitor fever curve  Monitor CBC  Mild reversal of A:G ratio workup per medicine    Thank you for the courtesy of this referral.    Dudley Madrigal MD  [phone]

## 2018-08-21 NOTE — PROGRESS NOTE ADULT - PROBLEM SELECTOR PLAN 1
on emp ABX  poss PNA  need old records from Dr. Bettye Hazel - PMD Pulmonary - [facility]  albuterol, robitussin, monitor sat, keep sat > 88 pct  ambulate  RVP pending  cx pending  procalcitonin and CRP noted  pt [place] chest in [place], hesitant to repeat CT chest in [place], radiation concern

## 2018-08-21 NOTE — DISCHARGE NOTE ADULT - PROVIDER TOKENS
TOKEN:'[id number]',FREE:[LAST:[Pdilla],FIRST:[Bettye],PHONE:[phone]],FAX:[(   )    -],ADDRESS:[[address] [place] [address]]],FREE:[LAST:[Lorenzo],FIRST:[Gadiel],PHONE:[phone]:[(   )    -],ADDRESS:[[address] [place] [id number]]]

## 2018-08-21 NOTE — DISCHARGE NOTE ADULT - PLAN OF CARE
Improvement You were given IV antibiotics  You were treated symptomatically with Albuterol and Robitussin Follow up with your cardiologist Dr. Ventura in 1-2 weeks and possibly obtain an echocardiogram. Controlled Follow up with your Pulmonologist Dr. Hazel in 1-2 weeks -You were given IV antibiotics  -You were treated symptomatically with Albuterol and Robitussin.  -Continue Antibiotics orally as prescribed  -Continue home remedies  -Continue to drink lots of water  -Continue probiotics  -Continue good sleep hygiene: No screens an hour before bed, keep the bedroom slightly cool and quiet, keep bedroom for sleeping and intimate activities only. Follow up with your cardiologist Dr. Ventura in 1-2 weeks for outpatient continuous holter monitor. May need outpatient repeat echocardiogram in 3-6 months post hospitalization. -You were given IV antibiotics  -You were treated symptomatically with Albuterol and Robitussin.  -Continue Antibiotics orally as prescribed  -Continue home remedies  -Continue to drink lots of water  -Continue probiotics  -Continue good sleep hygiene: No screens an hour before bed, keep the bedroom slightly cool and quiet, keep bedroom for sleeping and intimate activities only.  -If your fever persists after 7 days, or your symptoms get progressively worse, return to a hospital for further treatment. Follow up with your Pulmonologist Dr. Hazel in 1-2 weeks. s/p iv abx now dc on po abx   -You were treated symptomatically with Albuterol and Robitussin.  -Continue Antibiotics orally   -Continue to drink lots of water  -Continue probiotics Follow up with your cardiologist Dr. Ventura in 1-for outpatient  need event holter monitor. May need outpatient repeat echocardiogram in 3-6 months post hospitalization. Follow up with your Pulmonologist Dr. Hazel in 1-2 weeks. need further work for bl  pulm nodule and pleural thickening.

## 2018-08-21 NOTE — DISCHARGE NOTE ADULT - CONDITIONS AT DISCHARGE
Patient is alert and oriented x 4. IV saline lock was removed with no noted complications. Discharge planning and instructions were provided. Patient left the unit in stable and ambulatory condition.

## 2018-08-21 NOTE — PROGRESS NOTE ADULT - PROBLEM SELECTOR PLAN 4
chronic, Unstable MVP- SOB upon climbing 1 flight of stairs accompanied by chest pain   will check ce, ekg and echo  apprec Consult cardiology- Dr Pierre chronic, Unstable MVP- SOB upon climbing 1 flight of stairs accompanied by chest pain   will check cardiac enzyme are neg , echo pending   apprec Consult cardiology- Dr Pierre

## 2018-08-21 NOTE — PROGRESS NOTE ADULT - ASSESSMENT
57 yo F with PMHx of MVP, unspecified chronic lung disease, non-hodgkin lymphoma s/p chemotherapy, TIA 3 years ago presents to ED due to productive cough, fever and SOB admitted with sepsis sec to CAP and chest pain r/o acs. 57 yo F with PMHx of MVP, unspecified chronic lung disease, non-hodgkin lymphoma s/p chemotherapy, TIA 3 years ago presents to ED due to productive cough, fever and SOB admitted with sepsis sec to CAP and chest pain r/o acs. RVP negative. CTA ordered to rule out PE but patient refused. 59 yo F with PMHx of MVP, unspecified chronic lung disease, non-hodgkin lymphoma s/p chemotherapy, TIA 3 years ago presents to ED due to productive cough, fever and SOB admitted with sepsis sec to CAP and chest pain r/o acs. RVP negative. CTA ordered to rule out PE but patient refused  agree for only without contrast .

## 2018-08-21 NOTE — PROGRESS NOTE ADULT - PROBLEM SELECTOR PLAN 2
Acute Lower respiratory tract infection likely 2/2 community acquired pneumonia  -CBC WBC trending down  Albuterol NEBS BID for mucociliary clearance  Mucinex PRN for cough expectoration  Continue abx as plan above   Aspiration precautions   Follow Pneumologist recs Acute Lower respiratory tract infection likely 2/2 community acquired pneumonia  -CBC WBC trending down  Albuterol NEBS BID for mucociliary clearance  Mucinex PRN for cough expectoration  Continue abx as plan above   Aspiration precautions   Pulm following- Dr. Rashid Acute Lower respiratory tract infection likely 2/2 community acquired pneumonia    Albuterol NEBS BID for mucociliary clearance  Mucinex PRN for cough expectoration  Continue abx as plan above   Aspiration precautions   Pulm following- Dr. Rashid

## 2018-08-21 NOTE — PROGRESS NOTE ADULT - PROBLEM SELECTOR PLAN 3
Unspecified Chronic lung disease followed by Dr. Bettye Hazel in [facility] call for old records   Abx, nebs, o2 as needed, keep sat > 88 pct  Incentive spirometry   Pneumologist recs appreciated

## 2018-08-21 NOTE — DISCHARGE NOTE ADULT - HOSPITAL COURSE
59 yo F with PMHx of MVP, unspecified chronic lung disease, non-hodgkin lymphoma s/p chemotherapy, TIA 3 years ago presented to ED due to productive cough, fever and SOB admitted with sepsis sec to CAP and chest pain. Patient was started on IV antibiotics. Patient had elevated procalcitonin and CRP. Patient declined CTA to rule out PE as she did not want contrast. CXR showed bilateral upper lobe volume loss with hilar retraction; cannot exclude   underlying hilar mass/lymphadenopathy. Prominent bilateral reticulonodular interstitial markings may reflect chronic lung disease. Pulmonary (Dr. Rashid) was consulted. Patient started on duonebs and robitussin as needed. 57 yo F with PMHx of MVP, unspecified chronic lung disease, non-hodgkin lymphoma s/p chemotherapy, TIA 3 years ago presented to ED due to productive cough, fever and SOB   admitted with sepsis sec to CAP with and chest pain .   Patient was started on IV antibiotics  Rocephin and Zithromax  Patient had elevated procalcitonin and CRP. Patient declined CTA to rule out PE as she did not want contrast. CXR showed bilateral upper lobe volume loss with hilar retraction; cannot exclude   underlying hilar mass/lymphadenopathy. Prominent bilateral reticulonodular interstitial markings may reflect chronic lung disease. Pulmonary (Dr. Rashid) was consulted.    possible comm aq pna  on iv abx  ra pulse ox above 90 , blood cult are neg to date , pt hx nonhodgkin lymphoma had chemo 15 y ago  new LND vs mass as per xray chest   but  ct  showed chest bl pulm nodule    pt  out pt pulm dr velarde called  as per  her  out  side pulm  -pt refused all work up in past for make for making firm diagnosis for bl pulm nodule and pleural thick ing  , pt declined biopsy and bronchoscopy   and  very difficult to manage without diagnosis as per dr velarde , pt seen  by in july 18  / had breathing test vital capacity 1.58 lt , last ct scan march 2018 plum nodule nodule 12mm / pleural diffuse  thicking  rt hilar lnd. pt requested for PET scan out pt not done will needed out pt / pt is aware  to fu up .   pt Legionella and Strep Ag neg  tolerating room air. pt had     episode of cp possible  atypical as all troponin neg  echo is normal left ventricular size and left ventricular systolic   function / mvp and mr  noticed   need to fu out pt as per cardio dr georges  this time . pt cleared by [place] daniels to be dc out pt  with  close  fu up  pmd and pulm  office with in 1wk  for further work up . .   pmd dr blank last notified dc plan

## 2018-08-21 NOTE — PROGRESS NOTE ADULT - ATTENDING COMMENTS
pt seen and examine see above - hx  chr lung dis  with sob / fever , cough mild procalcitonin  possible comm aq pna , pt hx nonhodgkin lymphoma had chemo 15 y ago need ct chest to fu new LND vs mass as per xray chest  . episode of cp possible  atypical as all troponin neg / echo pending , cardio to dr kingston fu pt today.

## 2018-08-21 NOTE — DISCHARGE NOTE ADULT - PATIENT PORTAL LINK FT
You can access the [facility] Patient Portal, offered by [facility], by registering with the following website: [email or web address]

## 2018-08-21 NOTE — PROGRESS NOTE ADULT - SUBJECTIVE AND OBJECTIVE BOX
Date/Time Patient Seen:  		  Referring MD:   Data Reviewed	       Patient is a 58y old  Female who presents with a chief complaint of Cough, fever and SOB (20 Aug 2018 19:44)  vs and meds reviewed      Subjective/HPI     PAST MEDICAL & SURGICAL HISTORY:  Transient cerebral ischemia, unspecified type  Mitral prolapse  Pulmonary disease  History of appendectomy        Medication list         MEDICATIONS  (STANDING):  ALBUTerol    0.083% 2.5 milliGRAM(s) Nebulizer every 12 hours  aspirin enteric coated 81 milliGRAM(s) Oral daily  azithromycin  IVPB 500 milliGRAM(s) IV Intermittent every 24 hours  cefTRIAXone   IVPB 1 Gram(s) IV Intermittent every 24 hours  enoxaparin Injectable 40 milliGRAM(s) SubCutaneous daily    MEDICATIONS  (PRN):  acetaminophen   Tablet 650 milliGRAM(s) Oral every 6 hours PRN For Temp greater than 38 C (100.4 F)  guaiFENesin  milliGRAM(s) Oral every 12 hours PRN Cough         Vitals log        ICU Vital Signs Last 24 Hrs  T(C): 36.5 (21 Aug 2018 05:19), Max: 38.3 (20 Aug 2018 15:54)  T(F): 97.7 (21 Aug 2018 05:19), Max: 101 (20 Aug 2018 15:54)  HR: 86 (21 Aug 2018 05:19) (86 - 120)  BP: 98/64 (21 Aug 2018 05:19) (98/64 - 127/62)  BP(mean): --  ABP: --  ABP(mean): --  RR: 18 (21 Aug 2018 05:19) (16 - 20)  SpO2: 93% (21 Aug 2018 05:19) (93% - 99%)           Input and Output:  I&O's Detail    20 Aug 2018 07:01  -  21 Aug 2018 06:44  --------------------------------------------------------  IN:    Sodium Chloride 0.9% IV Bolus: 1000 mL  Total IN: 1000 mL    OUT:  Total OUT: 0 mL    Total NET: 1000 mL          Lab Data                        11.7   11.90 )-----------( 284      ( 21 Aug 2018 06:35 )             35.2     08-20    136  |  99  |  10  ----------------------------<  124<H>  4.1   |  28  |  0.96    Ca    9.5      20 Aug 2018 16:44    TPro  8.4<H>  /  Alb  3.3  /  TBili  0.4  /  DBili  x   /  AST  24  /  ALT  21  /  AlkPhos  115  08-20            Review of Systems	      Objective     Physical Examination      heart s1s2  lung dec BS  abd soft    Pertinent Lab findings & Imaging      Shauna:  NO   Adequate UO     I&O's Detail    20 Aug 2018 07:01  -  21 Aug 2018 06:44  --------------------------------------------------------  IN:    Sodium Chloride 0.9% IV Bolus: 1000 mL  Total IN: 1000 mL    OUT:  Total OUT: 0 mL    Total NET: 1000 mL               Discussed with:     Cultures:	        Radiology

## 2018-08-21 NOTE — PROGRESS NOTE ADULT - PROBLEM SELECTOR PLAN 1
on emp ABX  poss PNA  -need old records from Dr. Bettye Hazel - PMD Pulmonary - [facility]  albuterol, robitussin, monitor sat, keep sat > 88 pct  ambulate  RVP pending  cx pending  procalcitonin and CRP noted  pt had CT chest in [place], hesitant to repeat CT chest in [place], radiation concern on emp ABX  poss PNA  -need old records from Dr. Bettye Hazel - PMD Pulmonary - [facility]  albuterol, robitussin, monitor sat, keep sat > 88 pct  ambulate  RVP pending  cx pending  procalcitonin and CRP noted  pt had CT chest in [facility], hesitant to repeat CT chest in [place], radiation concern. Patient refused CTA to rule out PE for sinus tachycardia on pt with iv  ABX  poss  commaq PNA  -need old records from Dr. Bettye Hazel - PMD Pulmonary - [facility]  albuterol, robitussin, monitor sat, keep sat > 88 pct  ambulate  procalcitonin mild elevated    pt had CT chest in [facility] [place], hesitant to repeat CT chest in [place], radiation concern. Patient refused CTA to rule out PE  as sob and tachy hx nonhodgkin lymphoma risk vs benefit explained pt  have capacity to make decision still refusing

## 2018-08-21 NOTE — DISCHARGE NOTE ADULT - CARE PROVIDER_API CALL
Last, Joselito ARANA (), Internal Medicine  [facility] [place] [address]  Phone: [phone]  Fax: [phone]    Btetye Henley  [address] [place] [address]  Phone: [phone]  Fax: (   )    -    Gadiel Ventura  [address] [place] [address]  Phone: [phone]  Fax: (   )    - Last, Joselito ARANA (), Internal Medicine  [facility] [place] [address]  Phone: [phone]  Fax: [phone]    Bettye Henley  [address] [place] [address]  Phone: [phone]  Fax: (   )    -    Gadiel Ventura  [address] [place] [address]  Phone: [phone]  Fax: (   )    -

## 2018-08-21 NOTE — PROGRESS NOTE ADULT - SUBJECTIVE AND OBJECTIVE BOX
Patient is a 58y old  Female who presents with a chief complaint of Cough, fever and SOB (20 Aug 2018 19:44)      HPI:  59 yo F with PMHx of MVP, unspecified chronic lung disease, non-hodgkin lymphoma s/p chemotherapy presents to ED due to productive cough, fever and SOB. Patient states that 5 days ago she began having chills, dry cough, tachycardia and fever (101). Patient treated her fever with tylenol q6 but other symptoms persisted despite home made prepared tea with pop, thyme and honey. Finally, today she visited her PCP who referred her to [facility] for work up of bacterial pneumonia. Currently patient admits of constant SOB, palpitations, tachycardia, myalgias, productive cough with no hemoptysis, weakness, dizziness with no syncope episodes, nausea with no vomiting and decreased appetite. Patient denies current chest pain, abdominal pain, recent weight loss or night sweats. Patient reports she gets SOB walking up 1 flight of stairs since 1 1/2 year ago, accompanied by chest pain. Chest pain     In ED vitals are T: 101  /74 RR 18 sat at 96 on RA. WBC 14.24 H/H 13.5/39.8 PT- 13.1 INR-1.20 aPTT 33.3 Procalcitonin- 0.05 Glucose 124 Tot. protein 8.4 BNP- 390. CXR 	Bilateral upper lobe volume loss with hilar retraction; cannot exclude underlying hilar mass/lymphadenopathy. Prominent bilateral reticulonodular interstitial markings may reflect chronic lung disease.  Recommend comparison with prior chest x-ray. (20 Aug 2018 19:44)      INTERVAL HPI/OVERNIGHT EVENTS:  Patient is complaining she did not get any sleep last night due to ambient noise of hospital. Denies SOB, chest pain. Notes she's feeling "Out of it."  T(C): 36.5 (18 @ 05:19), Max: 38.3 (18 @ 15:54)  HR: 91 (18 @ 08:33) (86 - 120)  BP: 98/64 (18 @ 05:19) (98/64 - 127/62)  RR: 18 (18 @ 05:19) (16 - 20)  SpO2: 95% (18 @ 08:33) (93% - 99%)  Wt(kg): --  I&O's Summary    20 Aug 2018 07:01  -  21 Aug 2018 07:00  --------------------------------------------------------  IN: 1000 mL / OUT: 0 mL / NET: 1000 mL        REVIEW OF SYSTEMS:  CONSTITUTIONAL: +Fever, +Fatigue   EYES: No eye pain, visual disturbances, or discharge  ENMT:  No difficulty hearing, tinnitus, vertigo +Sinus pain, +Running nose  NECK: No pain or stiffness  RESPIRATORY: +Cough, No wheezing, chills or hemoptysis; No shortness of breath  CARDIOVASCULAR: No chest pain, palpitations, dizziness, or leg swelling  GASTROINTESTINAL: No abdominal or epigastric pain. No nausea, vomiting, or hematemesis; No diarrhea or constipation. No melena or hematochezia.  GENITOURINARY: No dysuria, frequency, hematuria, or incontinence  NEUROLOGICAL: +Headache, No memory loss, loss of strength, numbness, or tremors  SKIN: No itching, burning, rashes, or lesions   LYMPH NODES: No enlarged glands  MUSCULOSKELETAL: No joint pain or swelling; No muscle, back, or extremity pain  PSYCHIATRIC: No depression, anxiety, mood swings, or difficulty sleeping  ALLERY No hives or eczema    PHYSICAL EXAM:  GENERAL: NAD, well-groomed, well-developed  HEAD:  Atraumatic, Normocephalic  EYES: Conjunctiva and sclera clear  ENMT: No tonsillar erythema, exudates, or enlargement; Moist mucous membranes, Good dentition, No lesions  NECK: Supple, No JVD, Normal thyroid  NERVOUS SYSTEM:  Alert & Oriented X3, good concentration  CHEST/LUNG: Clear to percussion bilaterally; No rales, rhonchi, wheezing, or rubs  HEART: Regular rate and rhythm; No murmurs, rubs, or gallops  ABDOMEN: Soft, Nontender, Nondistended; Bowel sounds present  EXTREMITIES:  2+ Peripheral Pulses, No clubbing, cyanosis, or edema  LYMPH: No lymphadenopathy noted  SKIN: No rashes or lesions    MEDICATIONS  (STANDING):  ALBUTerol    0.083% 2.5 milliGRAM(s) Nebulizer every 12 hours  aspirin enteric coated 81 milliGRAM(s) Oral daily  azithromycin  IVPB 500 milliGRAM(s) IV Intermittent every 24 hours  cefTRIAXone   IVPB 1 Gram(s) IV Intermittent every 24 hours  enoxaparin Injectable 40 milliGRAM(s) SubCutaneous daily    MEDICATIONS  (PRN):  acetaminophen   Tablet 650 milliGRAM(s) Oral every 6 hours PRN For Temp greater than 38 C (100.4 F)  guaiFENesin  milliGRAM(s) Oral every 12 hours PRN Cough      LABS:                        11.7   11.90 )-----------( 284      ( 21 Aug 2018 06:35 )             35.2     08-20    136  |  99  |  10  ----------------------------<  124<H>  4.1   |  28  |  0.96    Ca    9.5      20 Aug 2018 16:44    TPro  8.4<H>  /  Alb  3.3  /  TBili  0.4  /  DBili  x   /  AST  24  /  ALT  21  /  AlkPhos  115  08-20    PT/INR - ( 20 Aug 2018 16:44 )   PT: 13.1 sec;   INR: 1.20 ratio         PTT - ( 20 Aug 2018 16:44 )  PTT:33.3 sec  Urinalysis Basic - ( 20 Aug 2018 19:56 )    Color: Yellow / Appearance: Clear / S.010 / pH: x  Gluc: x / Ketone: Negative  / Bili: Negative / Urobili: Negative   Blood: x / Protein: 25 mg/dL / Nitrite: Negative   Leuk Esterase: Trace / RBC: 0-2 /HPF / WBC 0-2   Sq Epi: x / Non Sq Epi: Occasional / Bacteria: Occasional      CAPILLARY BLOOD GLUCOSE                  RADIOLOGY & ADDITIONAL TESTS:    Imaging Personally Reviewed:       Advance Directives:      Palliative Care: Patient is a 58y old  Female who presents with a chief complaint of Cough, fever and SOB (20 Aug 2018 19:44)      HPI:57 yo F with PMHx of MVP, unspecified chronic lung disease, non-hodgkin lymphoma s/p chemotherapy presents to ED due to productive cough, fever and SOB. Patient states that 5 days ago she began having chills, dry cough, tachycardia and fever (101). Patient treated her fever with tylenol q6 but other symptoms persisted despite home made prepared tea with pop, thyme and honey. Finally, today she visited her PCP who referred her to [facility] for work up of bacterial pneumonia. Currently patient admits of constant SOB, palpitations, tachycardia, myalgias, productive cough with no hemoptysis, weakness, dizziness with no syncope episodes, nausea with no vomiting and decreased appetite. Patient denies current chest pain, abdominal pain, recent weight loss or night sweats. Patient reports she gets SOB walking up 1 flight of stairs since 1 1/2 year ago, accompanied by chest pain. Chest pain     In ED vitals are T: 101  /74 RR 18 sat at 96 on RA. WBC 14.24 H/H 13.5/39.8 PT- 13.1 INR-1.20 aPTT 33.3 Procalcitonin- 0.05 Glucose 124 Tot. protein 8.4 BNP- 390. CXR 	Bilateral upper lobe volume loss with hilar retraction; cannot exclude underlying hilar mass/lymphadenopathy. Prominent bilateral reticulonodular interstitial markings may reflect chronic lung disease.  Recommend comparison with prior chest x-ray. (20 Aug 2018 19:44)      INTERVAL HPI/OVERNIGHT EVENTS:  Patient is complaining she did not get any sleep last night due to ambient noise of hospital. Denies SOB, chest pain. Notes she's feeling "Out of it."  T(C): 36.5 (18 @ 05:19), Max: 38.3 (18 @ 15:54)  HR: 91 (18 @ 08:33) (86 - 120)  BP: 98/64 (18 @ 05:19) (98/64 - 127/62)  RR: 18 (18 @ 05:19) (16 - 20)  SpO2: 95% (18 @ 08:33) (93% - 99%)  Wt(kg): --  I&O's Summary    20 Aug 2018 07:01  -  21 Aug 2018 07:00  --------------------------------------------------------  IN: 1000 mL / OUT: 0 mL / NET: 1000 mL        REVIEW OF SYSTEMS:  CONSTITUTIONAL: +Fever, +Fatigue   EYES: No eye pain, visual disturbances, or discharge  ENMT:  No difficulty hearing, tinnitus, vertigo +Sinus pain, +Running nose  NECK: No pain or stiffness  RESPIRATORY: +Cough, No wheezing, chills or hemoptysis; No shortness of breath  CARDIOVASCULAR: No chest pain, palpitations, dizziness, or leg swelling  GASTROINTESTINAL: No abdominal or epigastric pain. No nausea, vomiting, or hematemesis; No diarrhea or constipation. No melena or hematochezia.  GENITOURINARY: No dysuria, frequency, hematuria, or incontinence  NEUROLOGICAL: +Headache, No memory loss, loss of strength, numbness, or tremors  SKIN: No itching, burning, rashes, or lesions   LYMPH NODES: No enlarged glands  MUSCULOSKELETAL: No joint pain or swelling; No muscle, back, or extremity pain  PSYCHIATRIC: No depression, anxiety, mood swings, or difficulty sleeping  ALLERY No hives or eczema    PHYSICAL EXAM:  GENERAL: NAD, well-groomed, well-developed  HEAD:  Atraumatic, Normocephalic  EYES: Conjunctiva and sclera clear  ENMT: No tonsillar erythema, exudates, or enlargement; Moist mucous membranes, Good dentition, No lesions  NECK: Supple, No JVD, Normal thyroid  NERVOUS SYSTEM:  Alert & Oriented X3, good concentration  CHEST/LUNG: Clear to percussion bilaterally; No rales, rhonchi, wheezing, or rubs  HEART: Regular rate and rhythm; No murmurs, rubs, or gallops  ABDOMEN: Soft, Nontender, Nondistended; Bowel sounds present  EXTREMITIES:  2+ Peripheral Pulses, No clubbing, cyanosis, or edema  LYMPH: No lymphadenopathy noted  SKIN: No rashes or lesions    MEDICATIONS  (STANDING):  ALBUTerol    0.083% 2.5 milliGRAM(s) Nebulizer every 12 hours  aspirin enteric coated 81 milliGRAM(s) Oral daily  azithromycin  IVPB 500 milliGRAM(s) IV Intermittent every 24 hours  cefTRIAXone   IVPB 1 Gram(s) IV Intermittent every 24 hours  enoxaparin Injectable 40 milliGRAM(s) SubCutaneous daily    MEDICATIONS  (PRN):  acetaminophen   Tablet 650 milliGRAM(s) Oral every 6 hours PRN For Temp greater than 38 C (100.4 F)  guaiFENesin  milliGRAM(s) Oral every 12 hours PRN Cough      LABS:                        11.7   11.90 )-----------( 284      ( 21 Aug 2018 06:35 )             35.2     08-20    136  |  99  |  10  ----------------------------<  124<H>  4.1   |  28  |  0.96    Ca    9.5      20 Aug 2018 16:44    TPro  8.4<H>  /  Alb  3.3  /  TBili  0.4  /  DBili  x   /  AST  24  /  ALT  21  /  AlkPhos  115  08-20    PT/INR - ( 20 Aug 2018 16:44 )   PT: 13.1 sec;   INR: 1.20 ratio         PTT - ( 20 Aug 2018 16:44 )  PTT:33.3 sec  Urinalysis Basic - ( 20 Aug 2018 19:56 )    Color: Yellow / Appearance: Clear / S.010 / pH: x  Gluc: x / Ketone: Negative  / Bili: Negative / Urobili: Negative   Blood: x / Protein: 25 mg/dL / Nitrite: Negative   Leuk Esterase: Trace / RBC: 0-2 /HPF / WBC 0-2   Sq Epi: x / Non Sq Epi: Occasional / Bacteria: Occasional      CAPILLARY BLOOD GLUCOSE                  RADIOLOGY & ADDITIONAL TESTS:    Imaging Personally Reviewed:       Advance Directives:      Palliative Care: Patient is a 58y old  Female who presents with a chief complaint of Cough, fever and SOB (20 Aug 2018 19:44)      HPI:59 yo F with PMHx of MVP, unspecified chronic lung disease, non-hodgkin lymphoma s/p chemotherapy presents to ED due to productive cough, fever and SOB. Patient states that 5 days ago she began having chills, dry cough, tachycardia and fever (101). Patient treated her fever with tylenol q6 but other symptoms persisted despite home made prepared tea with pop, thyme and honey. Finally, today she visited her PCP who referred her to [facility] for work up of bacterial pneumonia. Currently patient admits of constant SOB, palpitations, tachycardia, myalgias, productive cough with no hemoptysis, weakness, dizziness with no syncope episodes, nausea with no vomiting and decreased appetite. Patient denies current chest pain, abdominal pain, recent weight loss or night sweats. Patient reports she gets SOB walking up 1 flight of stairs since 1 1/2 year ago, accompanied by chest pain. Chest pain   SOB admitted with sepsis sec to CAP and chest pain r/o acs.     INTERVAL HPI/OVERNIGHT EVENTS:  Patient is complaining she did not get any sleep last night due to ambient noise of hospital. Denies  chest pain. Notes she's feeling "Out of it." pt also state get sob while ambulate .   T(C): 36.5 (18 @ 05:19), Max: 38.3 (18 @ 15:54)  HR: 91 (18 @ 08:33) (86 - 120)  BP: 98/64 (18 @ 05:19) (98/64 - 127/62)  RR: 18 (18 @ 05:19) (16 - 20)  SpO2: 95% (18 @ 08:33) (93% - 99%)  Wt(kg): --  I&O's Summary    20 Aug 2018 07:01  -  21 Aug 2018 07:00  --------------------------------------------------------  IN: 1000 mL / OUT: 0 mL / NET: 1000 mL        REVIEW OF SYSTEMS:  CONSTITUTIONAL: +Fever, +Fatigue   EYES: No eye pain, visual disturbances, or discharge  ENMT:  vertigo +Sinus pain, +Running nose  NECK: No pain or stiffness  RESPIRATORY: +Cough, No wheezing, chills ; mild  shortness of breath  CARDIOVASCULAR: No chest pain, palpitations, dizziness, or leg swelling  GASTROINTESTINAL: No abdominal or epigastric pain. No nausea, vomiting, ; No diarrhea or constipation.  GENITOURINARY: No dysuria, frequency, hematuria,  NEUROLOGICAL: +Headache, No memory loss, loss of strength, numbness,   SKIN: No itching, burning, rashes, or lesions   MUSCULOSKELETAL: No joint pain or swelling; No muscle, back, or extremity pain     PHYSICAL EXAM:  GENERAL: NAD, well-groomed, weak   HEAD:  Atraumatic, Normocephalic  EYES: Conjunctiva and sclera clear  ENMT:  Moist mucous membranes,, No lesions  NECK: Supple, No JVD,  NERVOUS SYSTEM:  Alert & Oriented X3, good concentration  CHEST/LUNG: decrease percussion bilaterally; No rales, rhonchi, wheezing,  HEART: Regular rate and rhythm; No murmurs, mild tachy   ABDOMEN: Soft, Nontender, Nondistended; Bowel sounds present  EXTREMITIES:  2+ Peripheral Pulses, No clubbing, cyanosis, or edema   SKIN: No rashes or lesions    MEDICATIONS  (STANDING):  ALBUTerol    0.083% 2.5 milliGRAM(s) Nebulizer every 12 hours  aspirin enteric coated 81 milliGRAM(s) Oral daily  azithromycin  IVPB 500 milliGRAM(s) IV Intermittent every 24 hours  cefTRIAXone   IVPB 1 Gram(s) IV Intermittent every 24 hours  enoxaparin Injectable 40 milliGRAM(s) SubCutaneous daily    MEDICATIONS  (PRN):  acetaminophen   Tablet 650 milliGRAM(s) Oral every 6 hours PRN For Temp greater than 38 C (100.4 F)  guaiFENesin  milliGRAM(s) Oral every 12 hours PRN Cough      LABS:                        11.7   11.90 )-----------( 284      ( 21 Aug 2018 06:35 )             35.2     08-20    136  |  99  |  10  ----------------------------<  124<H>  4.1   |  28  |  0.96    Ca    9.5      20 Aug 2018 16:44    TPro  8.4<H>  /  Alb  3.3  /  TBili  0.4  /  DBili  x   /  AST  24  /  ALT  21  /  AlkPhos  115  08-20    PT/INR - ( 20 Aug 2018 16:44 )   PT: 13.1 sec;   INR: 1.20 ratio         PTT - ( 20 Aug 2018 16:44 )  PTT:33.3 sec  Urinalysis Basic - ( 20 Aug 2018 19:56 )    Color: Yellow / Appearance: Clear / S.010 / pH: x  Gluc: x / Ketone: Negative  / Bili: Negative / Urobili: Negative   Blood: x / Protein: 25 mg/dL / Nitrite: Negative   Leuk Esterase: Trace / RBC: 0-2 /HPF / WBC 0-2   Sq Epi: x / Non Sq Epi: Occasional / Bacteria: Occasional                    RADIOLOGY & ADDITIONAL TESTS:    Imaging Personally Reviewed:     Bilateral upper lobe volume loss with hilar retraction; cannot exclude   underlying hilar mass/lymphadenopathy.  Prominent bilateral reticulonodular interstitial markings may reflect   chronic lung disease.  Recommend comparison with prior chest x-ray. If prior chest x-ray is not   available for direct comparison, recommend further evaluation with   contrast enhanced chest CT scan.  Findings discussed with Dr. Estrada at the time of interpretation on   2018.    Advance Directives:    full code

## 2018-08-21 NOTE — PATIENT PROFILE ADULT. - PATIENT REPRESENTATIVE PHONE
Alert and oriented to person, place, time/situation. normal mood and affect. no apparent risk to self or others. [phone]

## 2018-08-21 NOTE — DISCHARGE NOTE ADULT - MEDICATION SUMMARY - MEDICATIONS TO TAKE
I will START or STAY ON the medications listed below when I get home from the hospital:    acetaminophen 325 mg oral tablet  -- 2 tab(s) by mouth every 6 hours, As needed, For Temp greater than 38 C (100.4 F)  -- Indication: For Fever    aspirin 81 mg oral delayed release tablet  -- 1 tab(s) by mouth once a day  -- Indication: For Stroke prevention    cefuroxime 250 mg oral tablet  -- 1 tab(s) by mouth 2 times a day   -- Finish all this medication unless otherwise directed by prescriber.  Medication should be taken with plenty of water.  Take with food or milk.    -- Indication: For Pneumonia    guaiFENesin 600 mg oral tablet, extended release  -- 1 tab(s) by mouth every 12 hours, As needed, Cough  -- Indication: For Cough    azithromycin 500 mg oral tablet  -- 1 tab(s) by mouth once a day MDD:500 mg  -- Do not take dairy products, antacids, or iron preparations within one hour of this medication.  Finish all this medication unless otherwise directed by prescriber.    -- Indication: For Pneumonia    lactobacillus acidophilus oral capsule  -- 1 tab(s) by mouth 3 times a day (before meals)  -- Indication: For GI health I will START or STAY ON the medications listed below when I get home from the hospital:    aspirin 81 mg oral delayed release tablet  -- 1 tab(s) by mouth once a day  -- Indication: For Prophy heart dis and tia     cefuroxime 500 mg oral tablet  -- 1 tab(s) by mouth 2 times a day   -- Finish all this medication unless otherwise directed by prescriber.  Medication should be taken with plenty of water.  Take with food or milk.    -- Indication: For Pneumonia    azithromycin 500 mg oral tablet  -- 1 tab(s) by mouth once a day MDD:500 mg  -- Do not take dairy products, antacids, or iron preparations within one hour of this medication.  Finish all this medication unless otherwise directed by prescriber.    -- Indication: For Pneumonia    lactobacillus acidophilus oral capsule  -- 1 tab(s) by mouth 3 times a day (before meals)  -- Indication: For GI health

## 2018-08-21 NOTE — DISCHARGE NOTE ADULT - ADDITIONAL INSTRUCTIONS
Bettye Henley  [address] [place] [address]  Phone: [phone]  Fax: (   )    -/ fu with in 1wk , need PET  scan ,  out pt bronchoscopy  and lung biopsy .follow up with your cardiologist Dr. Ventura  with 1wk -for outpatient  need event holter monitor. pmd  fu dr blank last office in 1wk / notified  dc plan .

## 2018-08-21 NOTE — CONSULT NOTE ADULT - SUBJECTIVE AND OBJECTIVE BOX
[facility], Division of Infectious Diseases  REBEKA Hudson, JOSUE Roy    WANDER, ROMIE  58y, Female  [id number]    HPI--  58F with history of unclassified chronic lung disease/fibrosis (followed by Dr. CURTIS Hazel) for at least the last 2 years with prior history of NHL who has been ill about 6 days prior to admission with increased shortness of breath, pleuritic chest pain, nonproductive cough, fevers, chills, weakness and lethargy. Patient took tylenol and home remedies but did not improve and presented to the emergency room. Here she has been given antibiotics vs. CAP with CTX/AZM. Patient had RVP performed which was negative. She's feeling somewhat better.    Patient recently travelled to [place]. She does not recall any definite sick contacts either there or elsewhere. No recent steroids or antibiotics.     Per discussion with patient and her  Dr. Hazel has been advocating what sounds like VATS/Bx, but patient has been trying to delay it "as long as possible."    PMH/PSH--  Transient cerebral ischemia, unspecified type  Mitral prolapse  Pulmonary disease  History of appendectomy      Allergies-- denies antibiotic allergies      Medications--  Antibiotics: azithromycin  IVPB 500 milliGRAM(s) IV Intermittent every 24 hours  cefTRIAXone   IVPB 1 Gram(s) IV Intermittent every 24 hours    Immunologic:   Other: acetaminophen   Tablet PRN  ALBUTerol    0.083%  aspirin enteric coated  enoxaparin Injectable  guaiFENesin ER PRN  sodium chloride 0.9%.      Social History--  EtOH: denies   Tobacco: denies   Drug Use: denies     Family/Marital History--   w children. All well.  Family history of stroke (Father)  Family history of breast cancer (Mother)    Remainder not relevant to clinical concern.    Travel/Environmental/Occupational History:  Presently on disability. Used to work in real estate    Review of Systems:  A >=10-point review of systems was obtained.     Pertinent positives and negatives--  Constitutional: + fevers. + Chills. No Rigors.   Eyes: denies.   ENMT: Meniere's disease  Cardiovascular: + chest pain. No palpitations.  Respiratory: + shortness of breath. + cough.  Gastrointestinal: No nausea or vomiting. No diarrhea or constipation.   Genitourinary: denies.   Musculoskeletal: generalized weakness  Skin: denies.   Neurologic: as above  Psychiatric: Pleasant. Appropriate affect.    Review of systems otherwise negative except as previously noted.    Physical Exam--  Vital Signs: T(F): 99.3 (08-21-18 @ 12:47), Max: 101 (08-20-18 @ 15:54)  HR: 91 (08-21-18 @ 08:33)  BP: 98/64 (08-21-18 @ 05:19)  RR: 18 (08-21-18 @ 05:19)  SpO2: 95% (08-21-18 @ 08:33)  Wt(kg): --  General: Nontoxic-appearing Female in no acute distress.  HEENT: AT/NC. Anicteric. Conjunctiva pink and moist. Oropharynx clear. Dentition fair.  Neck: Not rigid. No sense of mass.  Nodes: None palpable.  Lungs: Diminished breath sounds bilaterally without rales, wheezing or rhonchi  Heart: Regular rate and rhythm. No Murmur. No rub. No gallop. No palpable thrill.  Abdomen: Bowel sounds present and normoactive. Soft. Nondistended. Nontender. No sense of mass. No organomegaly.  Back: No spinal tenderness. No costovertebral angle tenderness.   Extremities: No cyanosis or clubbing. No edema.   Skin: Warm. Dry. Good turgor. No rash. No vasculitic stigmata.  Psychiatric: Appropriate affect and mood for situation.       Laboratory & Imaging Data--  CBC                        11.7   11.90 )-----------( 284      ( 21 Aug 2018 06:35 )             35.2     WBC Count: 14.24 K/uL (08.20.18 @ 16:44)      Chemistries  08-20    136  |  99  |  10  ----------------------------<  124<H>  4.1   |  28  |  0.96    Ca    9.5      20 Aug 2018 16:44    TPro  8.4<H>  /  Alb  3.3  /  TBili  0.4  /  DBili  x   /  AST  24  /  ALT  21  /  AlkPhos  115  08-20    RVP neg    Legionella Pending    CXR (personally reviewed) < from: Xray Chest 2 Views PA/Lat (08.20.18 @ 16:27) >  Impression:    Bilateral upper lobe volume loss with hilar retraction; cannot exclude   underlying hilar mass/lymphadenopathy.  Prominent bilateral reticulonodular interstitial markings may reflect   chronic lung disease.  Recommendcomparison with prior chest x-ray. If prior chest x-ray is not   available for direct comparison, recommend further evaluation with   contrast enhanced chest CT scan.  Findings discussed with Dr. Estrada at the time of interpretation on   8/20/2018.    < end of copied text >      Culture Data  None as of yet

## 2018-08-22 LAB
ANION GAP SERPL CALC-SCNC: 9 MMOL/L — SIGNIFICANT CHANGE UP (ref 5–17)
BUN SERPL-MCNC: 7 MG/DL — SIGNIFICANT CHANGE UP (ref 7–23)
CALCIUM SERPL-MCNC: 9.5 MG/DL — SIGNIFICANT CHANGE UP (ref 8.5–10.1)
CHLORIDE SERPL-SCNC: 103 MMOL/L — SIGNIFICANT CHANGE UP (ref 96–108)
CO2 SERPL-SCNC: 27 MMOL/L — SIGNIFICANT CHANGE UP (ref 22–31)
CREAT SERPL-MCNC: 0.8 MG/DL — SIGNIFICANT CHANGE UP (ref 0.5–1.3)
GLUCOSE SERPL-MCNC: 111 MG/DL — HIGH (ref 70–99)
HCT VFR BLD CALC: 38 % — SIGNIFICANT CHANGE UP (ref 34.5–45)
HGB BLD-MCNC: 12.7 G/DL — SIGNIFICANT CHANGE UP (ref 11.5–15.5)
LEGIONELLA AG UR QL: NEGATIVE — SIGNIFICANT CHANGE UP
MCHC RBC-ENTMCNC: 31.6 PG — SIGNIFICANT CHANGE UP (ref 27–34)
MCHC RBC-ENTMCNC: 33.4 GM/DL — SIGNIFICANT CHANGE UP (ref 32–36)
MCV RBC AUTO: 94.5 FL — SIGNIFICANT CHANGE UP (ref 80–100)
NRBC # BLD: 0 /100 WBCS — SIGNIFICANT CHANGE UP (ref 0–0)
PLATELET # BLD AUTO: 357 K/UL — SIGNIFICANT CHANGE UP (ref 150–400)
POTASSIUM SERPL-MCNC: 4 MMOL/L — SIGNIFICANT CHANGE UP (ref 3.5–5.3)
POTASSIUM SERPL-SCNC: 4 MMOL/L — SIGNIFICANT CHANGE UP (ref 3.5–5.3)
RBC # BLD: 4.02 M/UL — SIGNIFICANT CHANGE UP (ref 3.8–5.2)
RBC # FLD: 13.2 % — SIGNIFICANT CHANGE UP (ref 10.3–14.5)
S PNEUM AG UR QL: NEGATIVE — SIGNIFICANT CHANGE UP
SODIUM SERPL-SCNC: 139 MMOL/L — SIGNIFICANT CHANGE UP (ref 135–145)
WBC # BLD: 10.45 K/UL — SIGNIFICANT CHANGE UP (ref 3.8–10.5)
WBC # FLD AUTO: 10.45 K/UL — SIGNIFICANT CHANGE UP (ref 3.8–10.5)

## 2018-08-22 PROCEDURE — 99233 SBSQ HOSP IP/OBS HIGH 50: CPT | Mod: GC

## 2018-08-22 RX ORDER — ONDANSETRON 8 MG/1
4 TABLET, FILM COATED ORAL EVERY 6 HOURS
Qty: 0 | Refills: 0 | Status: DISCONTINUED | OUTPATIENT
Start: 2018-08-22 | End: 2018-08-23

## 2018-08-22 RX ADMIN — Medication 650 MILLIGRAM(S): at 20:55

## 2018-08-22 RX ADMIN — Medication 1 TABLET(S): at 11:20

## 2018-08-22 RX ADMIN — ONDANSETRON 4 MILLIGRAM(S): 8 TABLET, FILM COATED ORAL at 17:55

## 2018-08-22 RX ADMIN — Medication 1 TABLET(S): at 07:52

## 2018-08-22 RX ADMIN — Medication 1 TABLET(S): at 16:55

## 2018-08-22 RX ADMIN — SODIUM CHLORIDE 100 MILLILITER(S): 9 INJECTION INTRAMUSCULAR; INTRAVENOUS; SUBCUTANEOUS at 11:26

## 2018-08-22 RX ADMIN — AZITHROMYCIN 255 MILLIGRAM(S): 500 TABLET, FILM COATED ORAL at 18:23

## 2018-08-22 RX ADMIN — CEFTRIAXONE 100 GRAM(S): 500 INJECTION, POWDER, FOR SOLUTION INTRAMUSCULAR; INTRAVENOUS at 16:55

## 2018-08-22 NOTE — PROGRESS NOTE ADULT - PROBLEM SELECTOR PLAN 2
Acute Lower respiratory tract infection likely 2/2 community acquired pneumonia    -See CT above  -Albuterol NEBS BID for mucociliary clearance  -Mucinex PRN for cough expectoration  -Tylenol PRN for fevers  Continue abx as plan above   -RPV negative  -Aspiration precautions   -Pulmonology following: Dr. Rashid

## 2018-08-22 NOTE — PROGRESS NOTE ADULT - PROBLEM SELECTOR PLAN 4
chronic, Unstable MVP- SOB upon climbing 1 flight of stairs accompanied by chest pain   -Pt. has had MVP since she was a child  -Echo shows no Diastolic or Systolic failure but MR noted  -Cardiology rec's appreciated (Dr Pierre)  -Patient follows Dr. Ventura outpatient but has not been evaluated in > 6 months as per patient chronic stable   -Pt. has had MVP since she was a child  -Echo shows no Diastolic or Systolic failure but MR noted  -Cardiology rec's appreciated (Dr Pierre)  -Patient follows Dr. Vnetura outpatient but has not been evaluated in > 6 months as per patient / fu out pt cardio.

## 2018-08-22 NOTE — PROGRESS NOTE ADULT - PROBLEM SELECTOR PLAN 1
-Awaiting old old records from Dr. Bettye Hazel - PMD Pulmonary - [facility]  albuterol, robitussin, monitor sat, keep sat > 88 pct  -ambulate  -procalcitonin mildly elevated  -CT scan w/o contrast (pt. declined with contrast) demonstrates hilar nodules suspicious of possibly extra pal lymphoma given hx of non-hodgkins lymphoma. Differential includes possible inflammatory process. Will discuss further with patient once records from [facility] are obtained and I have had a discussion with Dr. Hazel.  -Continue Ceftriaxone 1g IV Q24H  -Continue Azithromycin 500 mg IV Q24H -Awaiting old old records from Dr. Bettye Hazel - PMD Pulmonary - [facility]  albuterol, robitussin, monitor sat, keep sat > 88 pct  -procalcitonin mildly elevated  -CT scan w/o contrast (pt. declined with contrast) demonstrates hilar nodules suspicious of possibly extra pal lymphoma given hx of non-hodgkins lymphoma. Differential includes possible inflammatory process.   -Continue Ceftriaxone 1g IV Q24H  -Continue Azithromycin 500 mg IV Q24H

## 2018-08-22 NOTE — PROGRESS NOTE ADULT - PROBLEM SELECTOR PLAN 1
poss LRTI  ID follow up  CT chest reviewed  febrile last night  tylenol PRN  emp ABX  increase activity and monitor vs and Sat

## 2018-08-22 NOTE — PROGRESS NOTE ADULT - SUBJECTIVE AND OBJECTIVE BOX
Date/Time Patient Seen:  		  Referring MD:   Data Reviewed	       Patient is a 58y old  Female who presents with a chief complaint of Cough, fever and SOB (21 Aug 2018 11:13)  vs and meds reviewed      Subjective/HPI     PAST MEDICAL & SURGICAL HISTORY:  Transient cerebral ischemia, unspecified type  Mitral prolapse  Pulmonary disease  History of appendectomy        Medication list         MEDICATIONS  (STANDING):  ALBUTerol    0.083% 2.5 milliGRAM(s) Nebulizer every 12 hours  aspirin enteric coated 81 milliGRAM(s) Oral daily  azithromycin  IVPB 500 milliGRAM(s) IV Intermittent every 24 hours  cefTRIAXone   IVPB 1 Gram(s) IV Intermittent every 24 hours  enoxaparin Injectable 40 milliGRAM(s) SubCutaneous daily  lactobacillus acidophilus 1 Tablet(s) Oral three times a day with meals  sodium chloride 0.9%. 1000 milliLiter(s) (100 mL/Hr) IV Continuous <Continuous>    MEDICATIONS  (PRN):  acetaminophen   Tablet 650 milliGRAM(s) Oral every 6 hours PRN For Temp greater than 38 C (100.4 F)  guaiFENesin  milliGRAM(s) Oral every 12 hours PRN Cough         Vitals log        ICU Vital Signs Last 24 Hrs  T(C): 37.6 (22 Aug 2018 04:59), Max: 38.4 (21 Aug 2018 21:06)  T(F): 99.7 (22 Aug 2018 04:59), Max: 101.1 (21 Aug 2018 21:06)  HR: 85 (22 Aug 2018 04:59) (85 - 104)  BP: 119/76 (22 Aug 2018 04:59) (99/66 - 130/80)  BP(mean): --  ABP: --  ABP(mean): --  RR: 16 (22 Aug 2018 04:59) (16 - 17)  SpO2: 96% (22 Aug 2018 04:59) (95% - 97%)           Input and Output:  I&O's Detail    20 Aug 2018 07:01  -  21 Aug 2018 07:00  --------------------------------------------------------  IN:    Sodium Chloride 0.9% IV Bolus: 1000 mL  Total IN: 1000 mL    OUT:  Total OUT: 0 mL    Total NET: 1000 mL      21 Aug 2018 07:01  -  22 Aug 2018 06:59  --------------------------------------------------------  IN:    sodium chloride 0.9%.: 50 mL    Solution: 50 mL    Solution: 250 mL  Total IN: 350 mL    OUT:  Total OUT: 0 mL    Total NET: 350 mL          Lab Data                        11.7   11.90 )-----------( 284      ( 21 Aug 2018 06:35 )             35.2     08-20    136  |  99  |  10  ----------------------------<  124<H>  4.1   |  28  |  0.96    Ca    9.5      20 Aug 2018 16:44    TPro  8.4<H>  /  Alb  3.3  /  TBili  0.4  /  DBili  x   /  AST  24  /  ALT  21  /  AlkPhos  115  08-20      CARDIAC MARKERS ( 21 Aug 2018 22:09 )  <.015 ng/mL / x     / x     / x     / x      CARDIAC MARKERS ( 21 Aug 2018 13:26 )  <.015 ng/mL / x     / x     / x     / x      CARDIAC MARKERS ( 21 Aug 2018 06:35 )  <.015 ng/mL / x     / 117 U/L / x     / <1.0 ng/mL        Review of Systems	      Objective     Physical Examination    heart s1s2  lung dec BS  abd soft  kyphosis      Pertinent Lab findings & Imaging      Shauna:  NO   Adequate UO     I&O's Detail    20 Aug 2018 07:01  -  21 Aug 2018 07:00  --------------------------------------------------------  IN:    Sodium Chloride 0.9% IV Bolus: 1000 mL  Total IN: 1000 mL    OUT:  Total OUT: 0 mL    Total NET: 1000 mL      21 Aug 2018 07:01  -  22 Aug 2018 06:59  --------------------------------------------------------  IN:    sodium chloride 0.9%.: 50 mL    Solution: 50 mL    Solution: 250 mL  Total IN: 350 mL    OUT:  Total OUT: 0 mL    Total NET: 350 mL               Discussed with:     Cultures:	        Radiology

## 2018-08-22 NOTE — PROGRESS NOTE ADULT - ASSESSMENT
59 yo F with PMHx of MVP, unspecified chronic lung disease, non-hodgkin lymphoma s/p chemotherapy, TIA 3 years ago presents to ED due to productive cough, fever and SOB admitted with sepsis sec to CAP and chest pain r/o acs. RVP negative. CTA ordered to rule out PE but patient refused  agree for only without contrast . 57 yo F with PMHx of MVP, unspecified chronic lung disease, non-hodgkin lymphoma s/p chemotherapy, TIA 3 years ago presents to ED due to productive cough, fever and SOB admitted with sepsis sec to CAP and chest pain  but ruled out  acs. RVP negative. CTA ordered to rule out PE but patient refused / ct chest done with out contrast  found to have bl pulm nodes pt state possible old waiting her pulm to call us back .

## 2018-08-22 NOTE — PROGRESS NOTE ADULT - SUBJECTIVE AND OBJECTIVE BOX
[facility] Cardiology Progress Note [phone] (Dr. Sommer, Juan, Ignacio, Ishmael)    CHIEF COMPLAINT: Patient is a 58y old  Female who presents with a chief complaint of Cough, fever and SOB (21 Aug 2018 11:13)      Follow Up Today: The patient denies any chest discomfort or shortness of breath.    HPI:  59 yo F with PMHx of MVP, unspecified chronic lung disease, non-hodgkin lymphoma s/p chemotherapy presents to ED due to productive cough, fever and SOB. Patient states that 5 days ago she began having chills, dry cough, tachycardia and fever (101). Patient treated her fever with tylenol q6 but other symptoms persisted despite home made prepared tea with pop, thyme and honey. Finally, today she visited her PCP who referred her to [facility] for work up of bacterial pneumonia. Currently patient admits of constant SOB, palpitations, tachycardia, myalgias, productive cough with no hemoptysis, weakness, dizziness with no syncope episodes, nausea with no vomiting and decreased appetite. Patient denies current chest pain, abdominal pain, recent weight loss or night sweats. Patient reports she gets SOB walking up 1 flight of stairs since 1 1/2 year ago, accompanied by chest pain. Chest pain     In ED vitals are T: 101  /74 RR 18 sat at 96 on RA. WBC 14.24 H/H 13.5/39.8 PT- 13.1 INR-1.20 aPTT 33.3 Procalcitonin- 0.05 Glucose 124 Tot. protein 8.4 BNP- 390. CXR 	Bilateral upper lobe volume loss with hilar retraction; cannot exclude underlying hilar mass/lymphadenopathy. Prominent bilateral reticulonodular interstitial markings may reflect chronic lung disease.  Recommend comparison with prior chest x-ray. (20 Aug 2018 19:44)      PAST MEDICAL & SURGICAL HISTORY:  Transient cerebral ischemia, unspecified type  Mitral prolapse  Pulmonary disease  History of appendectomy      MEDICATIONS  (STANDING):  ALBUTerol    0.083% 2.5 milliGRAM(s) Nebulizer every 12 hours  aspirin enteric coated 81 milliGRAM(s) Oral daily  azithromycin  IVPB 500 milliGRAM(s) IV Intermittent every 24 hours  cefTRIAXone   IVPB 1 Gram(s) IV Intermittent every 24 hours  enoxaparin Injectable 40 milliGRAM(s) SubCutaneous daily  lactobacillus acidophilus 1 Tablet(s) Oral three times a day with meals  sodium chloride 0.9%. 1000 milliLiter(s) (100 mL/Hr) IV Continuous <Continuous>    MEDICATIONS  (PRN):  acetaminophen   Tablet 650 milliGRAM(s) Oral every 6 hours PRN For Temp greater than 38 C (100.4 F)  guaiFENesin  milliGRAM(s) Oral every 12 hours PRN Cough      Allergies    shellfish. (Anaphylaxis)    Intolerances        REVIEW OF SYSTEMS:    All other review of systems is negative unless indicated above    Vital Signs Last 24 Hrs  T(C): 37.6 (22 Aug 2018 04:59), Max: 38.4 (21 Aug 2018 21:06)  T(F): 99.7 (22 Aug 2018 04:59), Max: 101.1 (21 Aug 2018 21:06)  HR: 85 (22 Aug 2018 04:59) (85 - 104)  BP: 119/76 (22 Aug 2018 04:59) (99/66 - 130/80)  BP(mean): --  RR: 16 (22 Aug 2018 04:59) (16 - 17)  SpO2: 96% (22 Aug 2018 04:59) (95% - 97%)    I&O's Summary    21 Aug 2018 07:01  -  22 Aug 2018 07:00  --------------------------------------------------------  IN: 1550 mL / OUT: 0 mL / NET: 1550 mL        PHYSICAL EXAM:      Constitutional: Awake in NAD  HEENT:  SANTOS, EOMI  Neck: No JVD  Pulmonary: Rales/Rhonchi bilaterally  Cardiovascular: PMI not palpable non-displaced Regular S1 and S2, no murmurs  Gastrointestinal: Bowel Sounds present, soft, nontender.   Extremities: No significant peripheral edema.   Neuro: No gross focal deficits    LABS: All Labs Reviewed:                        11.7   11.90 )-----------( 284      ( 21 Aug 2018 06:35 )             35.2                         13.5   14.24 )-----------( 318      ( 20 Aug 2018 16:44 )             39.8     20 Aug 2018 16:44    136    |  99     |  10     ----------------------------<  124    4.1     |  28     |  0.96     Ca    9.5        20 Aug 2018 16:44    TPro  8.4    /  Alb  3.3    /  TBili  0.4    /  DBili  x      /  AST  24     /  ALT  21     /  AlkPhos  115    20 Aug 2018 16:44    PT/INR - ( 20 Aug 2018 16:44 )   PT: 13.1 sec;   INR: 1.20 ratio         PTT - ( 20 Aug 2018 16:44 )  PTT:33.3 sec  CARDIAC MARKERS ( 21 Aug 2018 22:09 )  <.015 ng/mL / x     / x     / x     / x      CARDIAC MARKERS ( 21 Aug 2018 13:26 )  <.015 ng/mL / x     / x     / x     / x      CARDIAC MARKERS ( 21 Aug 2018 06:35 )  <.015 ng/mL / x     / 117 U/L / x     / <1.0 ng/mL      Blood Culture: Organism --  Gram Stain Blood -- Gram Stain --  Specimen Source .Urine Clean Catch (Midstream)  Culture-Blood --    Organism --  Gram Stain Blood -- Gram Stain --  Specimen Source .Blood Blood-Peripheral  Culture-Blood --      08-20 @ 17:59  Pro Bnp 390    08-20 @ 17:59  TSH: 0.77      RADIOLOGY/EKG:    Attending Attestation:   20 minutes spent on total encounter; more than 50% of the visit was spent counseling and/or coordinating care by the attending physician.     ASSESSMENT AND PLAN [facility] Cardiology Progress Note [phone] (Dr. Sommer, Juan, Ignacio, Ishmael)    CHIEF COMPLAINT: Patient is a 58y old  Female who presents with a chief complaint of Cough, fever and SOB (21 Aug 2018 11:13)      Follow Up Today: The patient denies any chest discomfort. Daughter at bedside.  I discussed with her the results of the echocardiogram at length.  She feels slighlty better.  HPI:  59 yo F with PMHx of MVP, unspecified chronic lung disease, non-hodgkin lymphoma s/p chemotherapy presents to ED due to productive cough, fever and SOB. Patient states that 5 days ago she began having chills, dry cough, tachycardia and fever (101). Patient treated her fever with tylenol q6 but other symptoms persisted despite home made prepared tea with pop, thyme and honey. Finally, today she visited her PCP who referred her to [facility] for work up of bacterial pneumonia. Currently patient admits of constant SOB, palpitations, tachycardia, myalgias, productive cough with no hemoptysis, weakness, dizziness with no syncope episodes, nausea with no vomiting and decreased appetite. Patient denies current chest pain, abdominal pain, recent weight loss or night sweats. Patient reports she gets SOB walking up 1 flight of stairs since 1 1/2 year ago, accompanied by chest pain. Chest pain     In ED vitals are T: 101  /74 RR 18 sat at 96 on RA. WBC 14.24 H/H 13.5/39.8 PT- 13.1 INR-1.20 aPTT 33.3 Procalcitonin- 0.05 Glucose 124 Tot. protein 8.4 BNP- 390. CXR 	Bilateral upper lobe volume loss with hilar retraction; cannot exclude underlying hilar mass/lymphadenopathy. Prominent bilateral reticulonodular interstitial markings may reflect chronic lung disease.  Recommend comparison with prior chest x-ray. (20 Aug 2018 19:44)      PAST MEDICAL & SURGICAL HISTORY:  Transient cerebral ischemia, unspecified type  Mitral prolapse  Pulmonary disease  History of appendectomy      MEDICATIONS  (STANDING):  ALBUTerol    0.083% 2.5 milliGRAM(s) Nebulizer every 12 hours  aspirin enteric coated 81 milliGRAM(s) Oral daily  azithromycin  IVPB 500 milliGRAM(s) IV Intermittent every 24 hours  cefTRIAXone   IVPB 1 Gram(s) IV Intermittent every 24 hours  enoxaparin Injectable 40 milliGRAM(s) SubCutaneous daily  lactobacillus acidophilus 1 Tablet(s) Oral three times a day with meals  sodium chloride 0.9%. 1000 milliLiter(s) (100 mL/Hr) IV Continuous <Continuous>    MEDICATIONS  (PRN):  acetaminophen   Tablet 650 milliGRAM(s) Oral every 6 hours PRN For Temp greater than 38 C (100.4 F)  guaiFENesin  milliGRAM(s) Oral every 12 hours PRN Cough      Allergies    shellfish. (Anaphylaxis)    Intolerances        REVIEW OF SYSTEMS:    All other review of systems is negative unless indicated above    Vital Signs Last 24 Hrs  T(C): 37.6 (22 Aug 2018 04:59), Max: 38.4 (21 Aug 2018 21:06)  T(F): 99.7 (22 Aug 2018 04:59), Max: 101.1 (21 Aug 2018 21:06)  HR: 85 (22 Aug 2018 04:59) (85 - 104)  BP: 119/76 (22 Aug 2018 04:59) (99/66 - 130/80)  BP(mean): --  RR: 16 (22 Aug 2018 04:59) (16 - 17)  SpO2: 96% (22 Aug 2018 04:59) (95% - 97%)    I&O's Summary    21 Aug 2018 07:01  -  22 Aug 2018 07:00  --------------------------------------------------------  IN: 1550 mL / OUT: 0 mL / NET: 1550 mL        PHYSICAL EXAM:      Constitutional: Awake in NAD  HEENT:  SANTOS, EOMI  Neck: No JVD  Pulmonary: Rales/Rhonchi bilaterally  Cardiovascular: PMI not palpable non-displaced Regular S1 and S2, no murmurs  Gastrointestinal: Bowel Sounds present, soft, nontender.   Extremities: No significant peripheral edema.   Neuro: No gross focal deficits    LABS: All Labs Reviewed:                        11.7   11.90 )-----------( 284      ( 21 Aug 2018 06:35 )             35.2                         13.5   14.24 )-----------( 318      ( 20 Aug 2018 16:44 )             39.8     20 Aug 2018 16:44    136    |  99     |  10     ----------------------------<  124    4.1     |  28     |  0.96     Ca    9.5        20 Aug 2018 16:44    TPro  8.4    /  Alb  3.3    /  TBili  0.4    /  DBili  x      /  AST  24     /  ALT  21     /  AlkPhos  115    20 Aug 2018 16:44    PT/INR - ( 20 Aug 2018 16:44 )   PT: 13.1 sec;   INR: 1.20 ratio         PTT - ( 20 Aug 2018 16:44 )  PTT:33.3 sec  CARDIAC MARKERS ( 21 Aug 2018 22:09 )  <.015 ng/mL / x     / x     / x     / x      CARDIAC MARKERS ( 21 Aug 2018 13:26 )  <.015 ng/mL / x     / x     / x     / x      CARDIAC MARKERS ( 21 Aug 2018 06:35 )  <.015 ng/mL / x     / 117 U/L / x     / <1.0 ng/mL      Blood Culture: Organism --  Gram Stain Blood -- Gram Stain --  Specimen Source .Urine Clean Catch (Midstream)  Culture-Blood --    Organism --  Gram Stain Blood -- Gram Stain --  Specimen Source .Blood Blood-Peripheral  Culture-Blood --      08-20 @ 17:59  Pro Bnp 390    08-20 @ 17:59  TSH: 0.77      RADIOLOGY/EKG:  < from: TTE Echo Doppler w/o Cont (08.21.18 @ 14:19) >    INTERPRETATION:  INDICATION: Shortness of breath    Conclusion:   1. There is normal left ventricular size and left ventricular systolic   function. The left ventricular ejection fraction is approximate 60%.  2. There are no gross wall motion abnormalities.  3. There is normal left ventricular wall thickness.  4. There is mild left atrial enlargement.  5. There is diastolic dysfunction.  6. There is classic mitralvalve prolapse with moderate to severe mitral   regurgitation.  7. There is mild to moderate tricuspid regurgitation with a right   ventricular systolic pressure of 39 mmHg consistent with borderline   pulmonary hypertension.  8. There is a trileaflet aortic valve with minimal atherosclerotic change   and normal leaflet excursion without significant aortic valve stenosis or   regurgitation.  9. There is trivial pulmonic insufficiency.  10. There is normal right atrial and right ventricular size and systolic   function.  11. There is a trivial to small pericardial effusion without any evidence   for tamponade.    < end of copied text >    Attending Attestation:   20 minutes spent on total encounter; more than 50% of the visit was spent counseling and/or coordinating care by the attending physician.     ASSESSMENT AND PLAN

## 2018-08-22 NOTE — PROGRESS NOTE ADULT - ASSESSMENT
58 F a/w cough, fever, SOB and chest discomfort in the setting of a hx of MVP and TIA.  Likely acute Respiratory infection now on IV abx feeling a little better. Doubt ACS/CHF/arrhythmia    Suggest:  1. Agree with asa 81mg daily and DVT prophylaxis  2. Get echocardiogram  3. Normally follows with my associate Dr. Ventura but has not seen him in > 1 year.  4. Check second troponin.  5. Abx per Pulm  6. Will follow. 58 F a/w cough, fever, SOB and chest discomfort in the setting of a hx of MVP and TIA.  Likely acute Respiratory infection now on IV abx feeling a little better. Doubt ACS/CHF/arrhythmia    Suggest:  1. Agree with asa 81mg daily and DVT prophylaxis  2. Normally follows with my associate Dr. Ventura but has not seen him in > 1 year. I advised that she will need to see him in 3 - 6 months post hospitalization for repeat outpatient echocardiogram.  4. Abx per Pulm  6. Will sign off for now. Please call as needed.

## 2018-08-22 NOTE — PROGRESS NOTE ADULT - PROBLEM SELECTOR PLAN 3
Unspecified Chronic lung disease followed by Dr. Bettye Hazel in [facility]  -Awaiting records as per above  -O2 PRN, keep 02 sat > 88%  -Continuous O2 monitoring as patient receives home O2 and states that saturation drops when she walks "for a bit", typically  -Incentive spirometry   -F/U Pulm Unspecified Chronic lung disease followed by Dr. Bettye Hazel in [facility]  -Awaiting records as per above  -O2 PRN, keep 02 sat > 88%    -Incentive spirometry   -F/U Pulm

## 2018-08-22 NOTE — PROGRESS NOTE ADULT - ATTENDING COMMENTS
pt seen and examine see above - hx  chr lung dis  with sob / fever , cough mild procalcitonin  possible comm aq pna  on iv abx  ra pulse ox above 90 , pt hx nonhodgkin lymphoma had chemo 15 y ago  new LND vs mass as per xray chest  and new ct  showed chest bl pulm nodule   so will need fu out pt pulm  .   episode of cp possible  atypical as all troponin neg  echo is normal left ventricular size and left ventricular systolic   function / mvp and mr  need to fu out pt as per cardio dr georges  this time . pt seen and examine see above - hx  chr lung dis  with sob / fever , cough mild procalcitonin  possible comm aq pna  on iv abx  ra pulse ox above 90 , pt hx nonhodgkin lymphoma had chemo 15 y ago  new LND vs mass as per xray chest  and new ct  showed chest bl pulm nodule   so will need fu out pt pulm  .   episode of cp possible  atypical as all troponin neg  echo is normal left ventricular size and left ventricular systolic   function / mvp and mr  need to fu out pt as per cardio dr georges  this time . pt  out pt pulm dr velarde called pt refused all work up in past for make for make firm diagnosis for bl pulm nodule , pt declined biopsy and bronchoscopy   and  very difficult to manage without diagnosis as per dr velarde , pt seen  by in july 18  / had breathing test vital capacity 1.58 lt , last ct scan march 2018 plum nodule nodule 12mm / pleural diffuse  thicking  rt hilar lnd. pt requested for PET scan out pt .

## 2018-08-22 NOTE — PROGRESS NOTE ADULT - SUBJECTIVE AND OBJECTIVE BOX
Patient is a 58y old  Female who presents with a chief complaint of Cough, fever and SOB (21 Aug 2018 11:13)      HPI:  57 yo F with PMHx of MVP, unspecified chronic lung disease, non-hodgkin lymphoma s/p chemotherapy presents to ED due to productive cough, fever and SOB. Patient states that 5 days ago she began having chills, dry cough, tachycardia and fever (101). Patient treated her fever with tylenol q6 but other symptoms persisted despite home made prepared tea with pop, thyme and honey. Finally, today she visited her PCP who referred her to [facility] for work up of bacterial pneumonia. Currently patient admits of constant SOB, palpitations, tachycardia, myalgias, productive cough with no hemoptysis, weakness, dizziness with no syncope episodes, nausea with no vomiting and decreased appetite. Patient denies current chest pain, abdominal pain, recent weight loss or night sweats. Patient reports she gets SOB walking up 1 flight of stairs since 1 1/2 year ago, accompanied by chest pain. Chest pain     In ED vitals are T: 101  /74 RR 18 sat at 96 on RA. WBC 14.24 H/H 13.5/39.8 PT- 13.1 INR-1.20 aPTT 33.3 Procalcitonin- 0.05 Glucose 124 Tot. protein 8.4 BNP- 390. CXR 	Bilateral upper lobe volume loss with hilar retraction; cannot exclude underlying hilar mass/lymphadenopathy. Prominent bilateral reticulonodular interstitial markings may reflect chronic lung disease.  Recommend comparison with prior chest x-ray. (20 Aug 2018 19:44)      INTERVAL HPI/OVERNIGHT EVENTS:  Patient notes she had difficulty sleeping last night due to the noise. No SOB, notes her heart rate "feels fast" and that she "feels hot" despite not having a Temp.     T(C): 37.3 (18 @ 09:54), Max: 38.4 (18 @ 21:06)  HR: 121 (18 @ 08:20) (85 - 121)  BP: 119/76 (18 @ 04:59) (99/66 - 130/80)  RR: 16 (18 @ 04:59) (16 - 17)  SpO2: 94% (18 @ 08:20) (94% - 97%)  I&O's Summary    21 Aug 2018 07:01  -  22 Aug 2018 07:00  --------------------------------------------------------  IN: 1550 mL / OUT: 0 mL / NET: 1550 mL        REVIEW OF SYSTEMS:  CONSTITUTIONAL: No fever, weight loss, or fatigue  EYES: No eye pain, visual disturbances, or discharge  ENMT:  No difficulty hearing, tinnitus, vertigo; No sinus or throat pain  NECK: No pain or stiffness  BREASTS: No pain, no masses,   RESPIRATORY: No cough, wheezing, chills or hemoptysis; No shortness of breath  CARDIOVASCULAR: No chest pain, palpitations, dizziness, or leg swelling  GASTROINTESTINAL: No abdominal or epigastric pain. No nausea, vomiting, or hematemesis; No diarrhea or constipation. No melena or hematochezia.  GENITOURINARY: No dysuria, frequency, hematuria, or incontinence  NEUROLOGICAL: No headaches, memory loss, loss of strength, numbness, or tremors  SKIN: No itching, burning, rashes, or lesions   LYMPH NODES: No enlarged glands  MUSCULOSKELETAL: No joint pain or swelling; No muscle, back, or extremity pain  PSYCHIATRIC: No depression, anxiety, mood swings, or difficulty sleeping  ALLERY No hives or eczema    PHYSICAL EXAM:  GENERAL: NAD, well-groomed, well-developed  HEAD:  Atraumatic, Normocephalic  EYES: Conjunctiva and sclera clear  ENMT: t; Moist mucous membranes, Good dentition, No lesions  NECK: Supple, No JVD, Normal thyroid  NERVOUS SYSTEM:  Alert & Oriented X3  CHEST/LUNG: Clear to percussion bilaterally; No rales, rhonchi, wheezing, or rubs  HEART: Regular rate and rhythm; No murmurs, rubs, or gallops  ABDOMEN: Soft, Nontender, Nondistended; Bowel sounds present  EXTREMITIES:  2+ Peripheral Pulses, No clubbing, cyanosis, or edema  LYMPH: No lymphadenopathy noted  SKIN: No rashes    MEDICATIONS  (STANDING):  ALBUTerol    0.083% 2.5 milliGRAM(s) Nebulizer every 12 hours  aspirin enteric coated 81 milliGRAM(s) Oral daily  azithromycin  IVPB 500 milliGRAM(s) IV Intermittent every 24 hours  cefTRIAXone   IVPB 1 Gram(s) IV Intermittent every 24 hours  enoxaparin Injectable 40 milliGRAM(s) SubCutaneous daily  lactobacillus acidophilus 1 Tablet(s) Oral three times a day with meals  sodium chloride 0.9%. 1000 milliLiter(s) (100 mL/Hr) IV Continuous <Continuous>    MEDICATIONS  (PRN):  acetaminophen   Tablet 650 milliGRAM(s) Oral every 6 hours PRN For Temp greater than 38 C (100.4 F)  guaiFENesin  milliGRAM(s) Oral every 12 hours PRN Cough      LABS:                        12.7   10.45 )-----------( 357      ( 22 Aug 2018 08:40 )             38.0         139  |  103  |  7   ----------------------------<  111<H>  4.0   |  27  |  0.80    Ca    9.5      22 Aug 2018 08:40    TPro  8.4<H>  /  Alb  3.3  /  TBili  0.4  /  DBili  x   /  AST  24  /  ALT  21  /  AlkPhos  115  08-20    PT/INR - ( 20 Aug 2018 16:44 )   PT: 13.1 sec;   INR: 1.20 ratio         PTT - ( 20 Aug 2018 16:44 )  PTT:33.3 sec  Urinalysis Basic - ( 20 Aug 2018 19:56 )    Color: Yellow / Appearance: Clear / S.010 / pH: x  Gluc: x / Ketone: Negative  / Bili: Negative / Urobili: Negative   Blood: x / Protein: 25 mg/dL / Nitrite: Negative   Leuk Esterase: Trace / RBC: 0-2 /HPF / WBC 0-2   Sq Epi: x / Non Sq Epi: Occasional / Bacteria: Occasional      CAPILLARY BLOOD GLUCOSE           @ 23:39   No growth  --  --   @ 21:32   No growth to date.  --  --          RADIOLOGY & ADDITIONAL TESTS:    Imaging Personally Reviewed:       Advance Directives:      Palliative Care: Patient is a 58y old  Female who presents with a chief complaint of Cough, fever and SOB (21 Aug 2018 11:13)      HPI:  59 yo F with PMHx of MVP, unspecified chronic lung disease, non-hodgkin lymphoma s/p chemotherapy presents to ED due to productive cough, fever and SOB. Patient states that 5 days ago she began having chills, dry cough, tachycardia and fever (101). Patient treated her fever with tylenol q6 but other symptoms persisted despite home made prepared tea with pop, thyme and honey. Finally, today she visited her PCP who referred her to [facility] for work up of bacterial pneumonia. Currently patient admits of constant SOB, palpitations, tachycardia, myalgias, productive cough with no hemoptysis, weakness, dizziness with no syncope episodes, nausea with no vomiting and decreased appetite. Patient denies current chest pain, abdominal pain, recent weight loss or night sweats. Patient reports she gets SOB walking up 1 flight of stairs since 1 1/2 year ago, accompanied by chest pain. Chest pain     cough, fever and SOB admitted with sepsis sec to CAP and chest pain r/o acs now found to have bl pulm nodule       INTERVAL HPI/OVERNIGHT EVENTS:  Patient notes she had difficulty sleeping last night due to the noise. No SOB, notes her heart rate "feels fast" and that she "feels hot" despite not having a Temp.     T(C): 37.3 (18 @ 09:54), Max: 38.4 (18 @ 21:06)  HR: 121 (18 @ 08:20) (85 - 121)  BP: 119/76 (18 @ 04:59) (99/66 - 130/80)  RR: 16 (18 @ 04:59) (16 - 17)  SpO2: 94% (18 @ 08:20) (94% - 97%)  I&O's Summary    21 Aug 2018 07:01  -  22 Aug 2018 07:00  --------------------------------------------------------  IN: 1550 mL / OUT: 0 mL / NET: 1550 mL        REVIEW OF SYSTEMS:  CONSTITUTIONAL: yes  fever, weight loss, or fatigue  EYES: No eye pain, visual disturbances, or discharge  ENMT:   No sinus or throat pain  NECK: No pain or stiffness  BREASTS: No pain, no masses,   RESPIRATORY: yes  cough, no  wheezing,  mild  shortness of breath  CARDIOVASCULAR: No chest pain, palpitations, dizziness, or leg swelling  GASTROINTESTINAL: No abdominal or epigastric pain. No nausea, vomiting, No diarrhea or constipation.   GENITOURINARY: No dysuria, frequency, hematuria, or incontinence  NEUROLOGICAL: No headaches, memory loss, loss of strength, numbness  MUSCULOSKELETAL: No joint pain or swelling; No muscle, back, or extremity pain      PHYSICAL EXAM:  GENERAL: NAD, well-groomed, well-developed  HEAD:  Atraumatic, Normocephalic  EYES: Conjunctiva and sclera clear  ENMT: Moist mucous membranes,, No lesions  NECK: Supple, No JVD,  NERVOUS SYSTEM:  Alert & Oriented X3 , no focal deficit   CHEST/LUNG:  percussion bilaterally   bl low bases ; No rales, rhonchi, wheezing,   HEART: Regular rate and rhythm; No murmurs,  no tachy   ABDOMEN: Soft, Nontender, Nondistended; Bowel sounds present  EXTREMITIES:  2+ Peripheral Pulses, No clubbing, cyanosis, or edema  SKIN: No rashes    MEDICATIONS  (STANDING):  ALBUTerol    0.083% 2.5 milliGRAM(s) Nebulizer every 12 hours  aspirin enteric coated 81 milliGRAM(s) Oral daily  azithromycin  IVPB 500 milliGRAM(s) IV Intermittent every 24 hours  cefTRIAXone   IVPB 1 Gram(s) IV Intermittent every 24 hours  enoxaparin Injectable 40 milliGRAM(s) SubCutaneous daily  lactobacillus acidophilus 1 Tablet(s) Oral three times a day with meals  sodium chloride 0.9%. 1000 milliLiter(s) (100 mL/Hr) IV Continuous <Continuous>    MEDICATIONS  (PRN):  acetaminophen   Tablet 650 milliGRAM(s) Oral every 6 hours PRN For Temp greater than 38 C (100.4 F)  guaiFENesin  milliGRAM(s) Oral every 12 hours PRN Cough      LABS:                        12.7   10.45 )-----------( 357      ( 22 Aug 2018 08:40 )             38.0     08-22    139  |  103  |  7   ----------------------------<  111<H>  4.0   |  27  |  0.80    Ca    9.5      22 Aug 2018 08:40    TPro  8.4<H>  /  Alb  3.3  /  TBili  0.4  /  DBili  x   /  AST  24  /  ALT  21  /  AlkPhos  115  08-20    PT/INR - ( 20 Aug 2018 16:44 )   PT: 13.1 sec;   INR: 1.20 ratio         PTT - ( 20 Aug 2018 16:44 )  PTT:33.3 sec  Urinalysis Basic - ( 20 Aug 2018 19:56 )    Color: Yellow / Appearance: Clear / S.010 / pH: x  Gluc: x / Ketone: Negative  / Bili: Negative / Urobili: Negative   Blood: x / Protein: 25 mg/dL / Nitrite: Negative   Leuk Esterase: Trace / RBC: 0-2 /HPF / WBC 0-2   Sq Epi: x / Non Sq Epi: Occasional / Bacteria: Occasional      CAPILLARY BLOOD GLUCOSE           @ 23:39   No growth  --  --   @ 21:32   No growth to date.  --  --          RADIOLOGY & ADDITIONAL TESTS:    Imaging Personally Reviewed:   IMPRESSION:  Patchy groundglass opacities with consolidations and multiple bilateral   pulmonary nodules as well as diffuse pleural thickening. Based on the   patient's history of non-Hodgkin's disease, these findings may represent   extranodal lymphoma. Differential diagnosis also includes inflammatory or   atypical infectious processes.    Small pericardial effusion. echo-The left ventricular ejection fraction is approximate 60%.  2. There are no gross wall motion abnormalities.  3. There is normal left ventricular wall thickness.  4. There is mild left atrial enlargement.  5. There is diastolic dysfunction.  6. There is classic mitralvalve prolapse with moderate to severe mitral   regurgitation.  7. There is mild to moderate tricuspid regurgitation with a right   ventricular systolic pressure of 39 mmHg consistent with borderline   pulmonary hypertension.  8. There is a trileaflet aortic valve with minimal atherosclerotic change   and normal leaflet excursion without significant aortic valve stenosis or   regurgitation.  9. There is trivial pulmonic insufficiency.      Advance Directives: full code

## 2018-08-22 NOTE — PROGRESS NOTE ADULT - PROBLEM SELECTOR PLAN 2
chronic lung disease, hx of NHL  ct chest reviewed  awaiting [facility] records  pt will need to follow up with Dr. Bettye Hazel in [facility] and compare the CT chest reports  shows pulm nodules and GGO appearance, differential broad  will follow  for now will focus on infectious nature of resp disease

## 2018-08-23 VITALS
TEMPERATURE: 99 F | HEART RATE: 101 BPM | DIASTOLIC BLOOD PRESSURE: 81 MMHG | SYSTOLIC BLOOD PRESSURE: 133 MMHG | RESPIRATION RATE: 18 BRPM | OXYGEN SATURATION: 97 %

## 2018-08-23 DIAGNOSIS — R50.9 FEVER, UNSPECIFIED: ICD-10-CM

## 2018-08-23 LAB
ANION GAP SERPL CALC-SCNC: 7 MMOL/L — SIGNIFICANT CHANGE UP (ref 5–17)
BASOPHILS # BLD AUTO: 0.05 K/UL — SIGNIFICANT CHANGE UP (ref 0–0.2)
BASOPHILS NFR BLD AUTO: 0.5 % — SIGNIFICANT CHANGE UP (ref 0–2)
BUN SERPL-MCNC: 10 MG/DL — SIGNIFICANT CHANGE UP (ref 7–23)
CALCIUM SERPL-MCNC: 9.2 MG/DL — SIGNIFICANT CHANGE UP (ref 8.5–10.1)
CHLORIDE SERPL-SCNC: 105 MMOL/L — SIGNIFICANT CHANGE UP (ref 96–108)
CO2 SERPL-SCNC: 29 MMOL/L — SIGNIFICANT CHANGE UP (ref 22–31)
CREAT SERPL-MCNC: 0.66 MG/DL — SIGNIFICANT CHANGE UP (ref 0.5–1.3)
EOSINOPHIL # BLD AUTO: 0.24 K/UL — SIGNIFICANT CHANGE UP (ref 0–0.5)
EOSINOPHIL NFR BLD AUTO: 2.3 % — SIGNIFICANT CHANGE UP (ref 0–6)
GLUCOSE SERPL-MCNC: 101 MG/DL — HIGH (ref 70–99)
HCT VFR BLD CALC: 34.2 % — LOW (ref 34.5–45)
HGB BLD-MCNC: 11.4 G/DL — LOW (ref 11.5–15.5)
IMM GRANULOCYTES NFR BLD AUTO: 1 % — SIGNIFICANT CHANGE UP (ref 0–1.5)
LYMPHOCYTES # BLD AUTO: 1.41 K/UL — SIGNIFICANT CHANGE UP (ref 1–3.3)
LYMPHOCYTES # BLD AUTO: 13.5 % — SIGNIFICANT CHANGE UP (ref 13–44)
MCHC RBC-ENTMCNC: 31.1 PG — SIGNIFICANT CHANGE UP (ref 27–34)
MCHC RBC-ENTMCNC: 33.3 GM/DL — SIGNIFICANT CHANGE UP (ref 32–36)
MCV RBC AUTO: 93.2 FL — SIGNIFICANT CHANGE UP (ref 80–100)
MONOCYTES # BLD AUTO: 1.06 K/UL — HIGH (ref 0–0.9)
MONOCYTES NFR BLD AUTO: 10.2 % — SIGNIFICANT CHANGE UP (ref 2–14)
NEUTROPHILS # BLD AUTO: 7.58 K/UL — HIGH (ref 1.8–7.4)
NEUTROPHILS NFR BLD AUTO: 72.5 % — SIGNIFICANT CHANGE UP (ref 43–77)
NRBC # BLD: 0 /100 WBCS — SIGNIFICANT CHANGE UP (ref 0–0)
PLATELET # BLD AUTO: 353 K/UL — SIGNIFICANT CHANGE UP (ref 150–400)
POTASSIUM SERPL-MCNC: 3.9 MMOL/L — SIGNIFICANT CHANGE UP (ref 3.5–5.3)
POTASSIUM SERPL-SCNC: 3.9 MMOL/L — SIGNIFICANT CHANGE UP (ref 3.5–5.3)
RBC # BLD: 3.67 M/UL — LOW (ref 3.8–5.2)
RBC # FLD: 13.2 % — SIGNIFICANT CHANGE UP (ref 10.3–14.5)
SODIUM SERPL-SCNC: 141 MMOL/L — SIGNIFICANT CHANGE UP (ref 135–145)
WBC # BLD: 10.44 K/UL — SIGNIFICANT CHANGE UP (ref 3.8–10.5)
WBC # FLD AUTO: 10.44 K/UL — SIGNIFICANT CHANGE UP (ref 3.8–10.5)

## 2018-08-23 PROCEDURE — 99239 HOSP IP/OBS DSCHRG MGMT >30: CPT

## 2018-08-23 RX ORDER — LACTOBACILLUS ACIDOPHILUS 100MM CELL
1 CAPSULE ORAL
Qty: 0 | Refills: 0 | COMMUNITY
Start: 2018-08-23

## 2018-08-23 RX ORDER — ACETAMINOPHEN 500 MG
2 TABLET ORAL
Qty: 0 | Refills: 0 | COMMUNITY
Start: 2018-08-23

## 2018-08-23 RX ORDER — CEFUROXIME AXETIL 250 MG
1 TABLET ORAL
Qty: 14 | Refills: 0 | OUTPATIENT
Start: 2018-08-23 | End: 2018-08-29

## 2018-08-23 RX ORDER — AZITHROMYCIN 500 MG/1
1 TABLET, FILM COATED ORAL
Qty: 5 | Refills: 0 | OUTPATIENT
Start: 2018-08-23 | End: 2018-08-27

## 2018-08-23 RX ORDER — ASPIRIN/CALCIUM CARB/MAGNESIUM 324 MG
1 TABLET ORAL
Qty: 0 | Refills: 0 | COMMUNITY
Start: 2018-08-23

## 2018-08-23 RX ADMIN — Medication 1 TABLET(S): at 10:03

## 2018-08-23 NOTE — PROGRESS NOTE ADULT - PROBLEM SELECTOR PLAN 1
could be secondary to acute pna on chronic lung disease  but with her history of chronic lung disease, differential is extensive including noninfectious etiology   I recommended bronchoscopy with cytology and path  Pt said, she will not do this.  She follows with a Pulmonologist at [place]  will complete ceftin 500 mg po bid til 8/26  azithromycin 500 mg daily til 8/24  with follow up to Her Pulmonologist in [facility] Dr. Hazel    Pt requesting to be discharged  she understands if any increased dyspnea, cough, fever to return to ED.

## 2018-08-23 NOTE — PROGRESS NOTE ADULT - PROBLEM SELECTOR PLAN 1
chronic lung disease  hx of NHL  follows with Dr. Bettye Hazel in [facility]  cont emp ABX for poss LRTI  ID follow up, pt with fever last night  Albuterol NEBS BID  enc out of bed as tolerated  monitor sat on exertion  pt reports feeling better  would complete full course of ABX - 7 days total  Legionella and Strep Ag neg  tolerating room air

## 2018-08-23 NOTE — PROGRESS NOTE ADULT - PROBLEM SELECTOR PLAN 4
chronic stable   -Pt. has had MVP since she was a child  -Echo shows no Diastolic or Systolic failure but MR noted  -Cardiology rec's appreciated (Dr Pierre)  -Patient follows Dr. Ventura outpatient but has not been evaluated in > 6 months as per patient / fu out pt cardio.

## 2018-08-23 NOTE — PROGRESS NOTE ADULT - ASSESSMENT
History consistent with pneumonia,   CXR very abnormal, but suspect most of the changes seen are chronic in nature.  CT chest done with ground glass patchy   primary team spoke with outpt pulmonologist and some abnormalities in imaging are chronic.    No recent antibiotics. History of hymphoma distantly, but no concern of active immune compromising condition or medication at the present time. No exotic travel.

## 2018-08-23 NOTE — PROGRESS NOTE ADULT - PROBLEM SELECTOR PLAN 5
chronic, stable  TIA as per history 3 years ago   ASA 81 mg
-Chronic, stable  -TIA as per history 3 years ago   -ASA 81 mg PO daily
-Chronic, stable  -TIA as per history 3 years ago   -ASA 81 mg PO daily

## 2018-08-23 NOTE — PROGRESS NOTE ADULT - PROBLEM SELECTOR PROBLEM 2
Chronic lung disease
Lower resp. tract infection

## 2018-08-23 NOTE — PROGRESS NOTE ADULT - ATTENDING COMMENTS
pt seen and examine see above - hx  chr lung dis  with sob / fever , cough mild procalcitonin  possible comm aq pna  on iv abx   day 3  ra pulse ox above 90 , blood cult are neg to date , pt hx nonhodgkin lymphoma had chemo 15 y ago  new LND vs mass as per xray chest   but  ct  showed chest bl pulm nodule    pt  out pt pulm dr velarde called  as per  her  out  side pulm  -pt refused all work up in past for make for making firm diagnosis for bl pulm nodule and pleural thick ing  , pt declined biopsy and bronchoscopy   and  very difficult to manage without diagnosis as per dr velarde , pt seen  by in july 18  / had breathing test vital capacity 1.58 lt , last ct scan march 2018 plum nodule nodule 12mm / pleural diffuse  thicking  rt hilar lnd. pt requested for PET scan out pt not done will needed out pt / pt is aware  to fu up .    episode of cp possible  atypical as all troponin neg  echo is normal left ventricular size and left ventricular systolic   function / mvp and mr  need to fu out pt as per cardio dr georges  this time . dc plan in 2 4 hr.

## 2018-08-23 NOTE — PROGRESS NOTE ADULT - ASSESSMENT
59 yo F with PMHx of MVP, unspecified chronic lung disease, non-hodgkin lymphoma s/p chemotherapy, TIA 3 years ago presents to ED due to productive cough, fever and SOB admitted with sepsis sec to CAP and chest pain  but ruled out  acs. RVP negative. CTA ordered to rule out PE but patient refused / ct chest done with out contrast  found to have bl pulm nodes pt state possible old waiting her pulm to call us back . 59 yo F with PMHx of MVP, unspecified chronic lung disease, non-hodgkin lymphoma s/p chemotherapy, TIA 3 years ago presents to ED due to productive cough, fever and SOB admitted with sepsis sec to CAP and chest pain  ruled out  acs  this time , with bl pulm nodule old as per pt pulm

## 2018-08-23 NOTE — PROGRESS NOTE ADULT - PROVIDER SPECIALTY LIST ADULT
Cardiology
Hospitalist
Infectious Disease
Pulmonology
Hospitalist
Hospitalist

## 2018-08-23 NOTE — PROGRESS NOTE ADULT - PROBLEM SELECTOR PLAN 1
-Old CT records received from [facility]  -albuterol, robitussin, monitor sat, keep sat > 88 pct  -procalcitonin mildly elevated  -CT scan w/o contrast (pt. declined with contrast) demonstrates hilar nodules suspicious of possibly extra pal lymphoma given hx of non-hodgkins lymphoma. Differential includes possible inflammatory process.   -Continue Ceftriaxone 1g IV Q24H  -Continue Azithromycin 500 mg IV Q24H  -Pulm Recs: 7 days of antibiotics  -Possible DC later today -Old CT records received from [facility]  -albuterol, robitussin, monitor sat, keep sat > 88 pct  -procalcitonin mildly elevated  -CT scan w/o contrast (pt. declined with contrast) demonstrates hilar nodules suspicious of possibly extra pal lymphoma given hx of non-hodgkins lymphoma. Differential includes possible inflammatory process.   -Continue Ceftriaxone 1g IV Q24H  -Continue Azithromycin 500 mg IV Q24H  -Pulm Recs: 7 days of antibiotics  -Possible DC in 24 hr if feel better

## 2018-08-23 NOTE — PROGRESS NOTE ADULT - PROBLEM SELECTOR PROBLEM 1
Lower resp. tract infection
Sepsis, due to unspecified organism
Fever
Lower resp. tract infection
Lower resp. tract infection

## 2018-08-23 NOTE — PROGRESS NOTE ADULT - PROBLEM SELECTOR PLAN 3
Unspecified Chronic lung disease followed by Dr. Bettye Hazel in [facility]  -Awaiting records as per above  -O2 PRN, keep 02 sat > 88%  -Incentive spirometry   -F/U Pulm Unspecified Chronic lung disease followed by Dr. Bettye Hazel in [facility]  -Awaiting records as per above  -O2 PRN, keep 02 sat > 88%  -Incentive spirometry   -F/U Pulm out pt for biopsy and pet scan

## 2018-08-23 NOTE — PROGRESS NOTE ADULT - SUBJECTIVE AND OBJECTIVE BOX
[facility], Division of Infectious Diseases  REBEKA [place] [address] [place]  [phone]  Name: ROMIE VIRAMONTES  Age: 58y  Gender: Female  MRN: [id number]    Interval History--  Notes reviewed  pt says she is coughing, some dyspnea on exertion  but not requiring any extra oxygen at rest    Past Medical History--  Transient cerebral ischemia, unspecified type  Mitral prolapse  Pulmonary disease  History of appendectomy      For details regarding the patient's social history, family history, and other miscellaneous elements, please refer the initial infectious diseases consultation and/or the admitting history and physical examination for this admission.    Allergies    shellfish. (Anaphylaxis)    Intolerances        Medications--  Antibiotics:  azithromycin  IVPB 500 milliGRAM(s) IV Intermittent every 24 hours  cefTRIAXone   IVPB 1 Gram(s) IV Intermittent every 24 hours    Immunologic:    Other:  acetaminophen   Tablet PRN  ALBUTerol    0.083%  aspirin enteric coated  enoxaparin Injectable  guaiFENesin ER PRN  lactobacillus acidophilus  ondansetron    Tablet PRN      Review of Systems--  A 10-point review of systems was obtained.     Pertinent positives and negatives--  Constitutional: No fevers. No Chills. No Rigors.   Cardiovascular: No chest pain. No palpitations.  Respiratory: No shortness of breath. No cough.  Gastrointestinal: No nausea or vomiting. No diarrhea or constipation.   Psychiatric: + depression    Review of systems otherwise negative except as previously noted.    Physical Examination--  Vital Signs: T(F): 99.1 (08-23-18 @ 14:25), Max: 101.2 (08-22-18 @ 20:34)  HR: 101 (08-23-18 @ 14:25)  BP: 133/81 (08-23-18 @ 14:25)  RR: 18 (08-23-18 @ 14:25)  SpO2: 97% (08-23-18 @ 14:25)  Wt(kg): --  General: Nontoxic-appearing Female in no acute distress.  HEENT: AT/NC. Anicteric. Conjunctiva pink and moist. Oropharynx clear. Dentition fair.  Neck: Not rigid. No sense of mass.  Nodes: None palpable.  Lungs: b/l rhonchi left > right   Heart: Regular rate and rhythm. No Murmur. No rub. No gallop. No palpable thrill.  Abdomen: Bowel sounds present and normoactive. Soft. Nondistended. Nontender.  Extremities: No cyanosis or clubbing. No edema.   Skin: Warm. Dry. Good turgor. No rash. No vasculitic stigmata.  Psychiatric: + depression        Laboratory Studies--  CBC                        11.4   10.44 )-----------( 353      ( 23 Aug 2018 07:08 )             34.2       Chemistries  08-23    141  |  105  |  10  ----------------------------<  101<H>  3.9   |  29  |  0.66    Ca    9.2      23 Aug 2018 07:08        Culture Data    Culture - Urine (collected 20 Aug 2018 23:39)  Source: .Urine Clean Catch (Midstream)  Final Report (21 Aug 2018 21:55):    No growth    Culture - Blood (collected 20 Aug 2018 21:32)  Source: .Blood Blood-Peripheral  Preliminary Report (21 Aug 2018 22:01):    No growth to date.    Culture - Blood (collected 20 Aug 2018 21:32)  Source: .Blood Blood-Peripheral  Preliminary Report (21 Aug 2018 22:01):    No growth to date.          < from: CT Chest No Cont (08.21.18 @ 16:31) >  XAM:  CT CHEST                            PROCEDURE DATE:  08/21/2018          INTERPRETATION:  CLINICAL INFORMATION: History of non-Hodgkin's disease,   abnormal chest radiograph    TECHNIQUE: CT scan of the chest was obtained without the administration   of contrast. Axial MIP images as well as coronal and sagittal   reformations were also obtained.    COMPARISON: No prior CT scan is available for comparison.    FINDINGS:  The thyroid gland is unremarkable.    The trachea and main bronchi are patent without endobronchial lesions.    There is no significant supraclavicular, mediastinal, hilar or axillary   adenopathy.    The lungs demonstrate patchy groundglass opacities with consolidations in   the bilateral lower lobes, right greater than left. There are several   solid pulmonary nodules in the lower lobes. For example there is a 7 x 6   mm noncalcified solid nodule in the right lower lobe (series 2, image   57). There is a 16 x 8 mm elongated noncalcified nodule in the left lower   lobe (image 57). In addition, there is diffuse pleural thickening.    The heart size is within normal limits. There is small pericardial   effusion.    The osseous structures are unremarkable.    CT scan of the upper abdomen shows no gross abnormalities.    IMPRESSION:  Patchy groundglass opacities with consolidations and multiple bilateral   pulmonary nodules as well as diffuse pleural thickening. Based on the   patient's history of non-Hodgkin's disease, these findings may represent   extranodal lymphoma. Differential diagnosis also includes inflammatory or   atypical infectious processes.    Small pericardial effusion.              < end of copied text >

## 2018-08-23 NOTE — PROGRESS NOTE ADULT - SUBJECTIVE AND OBJECTIVE BOX
Date/Time Patient Seen:  		  Referring MD:   Data Reviewed	       Patient is a 58y old  Female who presents with a chief complaint of Cough, fever and SOB (21 Aug 2018 11:13)    vs and meds reviewed    Subjective/HPI     PAST MEDICAL & SURGICAL HISTORY:  Transient cerebral ischemia, unspecified type  Mitral prolapse  Pulmonary disease  History of appendectomy        Medication list         MEDICATIONS  (STANDING):  ALBUTerol    0.083% 2.5 milliGRAM(s) Nebulizer every 12 hours  aspirin enteric coated 81 milliGRAM(s) Oral daily  azithromycin  IVPB 500 milliGRAM(s) IV Intermittent every 24 hours  cefTRIAXone   IVPB 1 Gram(s) IV Intermittent every 24 hours  enoxaparin Injectable 40 milliGRAM(s) SubCutaneous daily  lactobacillus acidophilus 1 Tablet(s) Oral three times a day with meals    MEDICATIONS  (PRN):  acetaminophen   Tablet 650 milliGRAM(s) Oral every 6 hours PRN For Temp greater than 38 C (100.4 F)  guaiFENesin  milliGRAM(s) Oral every 12 hours PRN Cough  ondansetron    Tablet 4 milliGRAM(s) Oral every 6 hours PRN Nausea and/or Vomiting         Vitals log        ICU Vital Signs Last 24 Hrs  T(C): 37.2 (23 Aug 2018 05:27), Max: 38.4 (22 Aug 2018 20:34)  T(F): 99 (23 Aug 2018 05:27), Max: 101.2 (22 Aug 2018 20:34)  HR: 94 (23 Aug 2018 05:27) (87 - 121)  BP: 113/73 (23 Aug 2018 05:27) (109/68 - 121/81)  BP(mean): --  ABP: --  ABP(mean): --  RR: 18 (23 Aug 2018 05:27) (16 - 18)  SpO2: 94% (23 Aug 2018 05:27) (83% - 97%)           Input and Output:  I&O's Detail    22 Aug 2018 07:01  -  23 Aug 2018 07:00  --------------------------------------------------------  IN:    sodium chloride 0.9%: 1000 mL    Solution: 50 mL    Solution: 250 mL  Total IN: 1300 mL    OUT:  Total OUT: 0 mL    Total NET: 1300 mL          Lab Data                        12.7   10.45 )-----------( 357      ( 22 Aug 2018 08:40 )             38.0     08-22    139  |  103  |  7   ----------------------------<  111<H>  4.0   |  27  |  0.80    Ca    9.5      22 Aug 2018 08:40        CARDIAC MARKERS ( 21 Aug 2018 22:09 )  <.015 ng/mL / x     / x     / x     / x      CARDIAC MARKERS ( 21 Aug 2018 13:26 )  <.015 ng/mL / x     / x     / x     / x            Review of Systems	      Objective     Physical Examination    heart s1s2  lung dec BS  abd soft      Pertinent Lab findings & Imaging      Shauna:  NO   Adequate UO     I&O's Detail    22 Aug 2018 07:01  -  23 Aug 2018 07:00  --------------------------------------------------------  IN:    sodium chloride 0.9%: 1000 mL    Solution: 50 mL    Solution: 250 mL  Total IN: 1300 mL    OUT:  Total OUT: 0 mL    Total NET: 1300 mL               Discussed with:     Cultures:	        Radiology

## 2018-08-23 NOTE — PROGRESS NOTE ADULT - SUBJECTIVE AND OBJECTIVE BOX
Patient is a 58y old  Female who presents with a chief complaint of Cough, fever and SOB (21 Aug 2018 11:13)      HPI:  59 yo F with PMHx of MVP, unspecified chronic lung disease, non-hodgkin lymphoma s/p chemotherapy presents to ED due to productive cough, fever and SOB. Patient states that 5 days ago she began having chills, dry cough, tachycardia and fever (101). Patient treated her fever with tylenol q6 but other symptoms persisted despite home made prepared tea with pop, thyme and honey. Finally, today she visited her PCP who referred her to [facility] for work up of bacterial pneumonia. Currently patient admits of constant SOB, palpitations, tachycardia, myalgias, productive cough with no hemoptysis, weakness, dizziness with no syncope episodes, nausea with no vomiting and decreased appetite. Patient denies current chest pain, abdominal pain, recent weight loss or night sweats. Patient reports she gets SOB walking up 1 flight of stairs since 1 1/2 year ago, accompanied by chest pain. Chest pain     cough, fever and SOB admitted with sepsis sec to CAP and chest pain r/o acs now found to have bl pulm nodule       INTERVAL HPI/OVERNIGHT EVENTS:  Unable to sleep well last night. No SOB, notes her heart continues to beat quickly and that she feels she has a fever.     Vital Signs Last 24 Hrs  T(C): 37.2 (23 Aug 2018 05:27), Max: 38.4 (22 Aug 2018 20:34)  T(F): 99 (23 Aug 2018 05:27), Max: 101.2 (22 Aug 2018 20:34)  HR: 94 (23 Aug 2018 05:27) (94 - 121)  BP: 113/73 (23 Aug 2018 05:27) (109/68 - 121/81)  BP(mean): --  RR: 18 (23 Aug 2018 05:27) (16 - 18)  SpO2: 94% (23 Aug 2018 05:27) (83% - 97%)        REVIEW OF SYSTEMS:  CONSTITUTIONAL: yes  fever, weight loss, or fatigue  EYES: No eye pain, visual disturbances, or discharge  ENMT:   No sinus or throat pain  NECK: No pain or stiffness  BREASTS: No pain, no masses,   RESPIRATORY: yes  cough, no  wheezing,  mild  shortness of breath  CARDIOVASCULAR: No chest pain, palpitations, dizziness, or leg swelling  GASTROINTESTINAL: No abdominal or epigastric pain. No nausea, vomiting, No diarrhea or constipation.   GENITOURINARY: No dysuria, frequency, hematuria, or incontinence  NEUROLOGICAL: No headaches, memory loss, loss of strength, numbness  MUSCULOSKELETAL: No joint pain or swelling; No muscle, back, or extremity pain      PHYSICAL EXAM:  GENERAL: NAD, well-groomed, well-developed  HEAD:  Atraumatic, Normocephalic  EYES: Conjunctiva and sclera clear  ENMT: Moist mucous membranes,, No lesions  NECK: Supple, No JVD,  NERVOUS SYSTEM:  Alert & Oriented X3 , no focal deficit   CHEST/LUNG:  percussion bilaterally   bl low bases ; +Rales in RLL  HEART: Regular rate and rhythm; No murmurs,  no tachy   ABDOMEN: Soft, Nontender, Nondistended; Bowel sounds present  EXTREMITIES:  2+ Peripheral Pulses, No clubbing, cyanosis, or edema  SKIN: No rashes    MEDICATIONS  (STANDING):  ALBUTerol    0.083% 2.5 milliGRAM(s) Nebulizer every 12 hours  aspirin enteric coated 81 milliGRAM(s) Oral daily  azithromycin  IVPB 500 milliGRAM(s) IV Intermittent every 24 hours  cefTRIAXone   IVPB 1 Gram(s) IV Intermittent every 24 hours  enoxaparin Injectable 40 milliGRAM(s) SubCutaneous daily  lactobacillus acidophilus 1 Tablet(s) Oral three times a day with meals  sodium chloride 0.9%. 1000 milliLiter(s) (100 mL/Hr) IV Continuous <Continuous>    MEDICATIONS  (PRN):  acetaminophen   Tablet 650 milliGRAM(s) Oral every 6 hours PRN For Temp greater than 38 C (100.4 F)  guaiFENesin  milliGRAM(s) Oral every 12 hours PRN Cough      LABS:                 141  |  105  |  10  ----------------------------<  101<H>  3.9   |  29  |  0.66    Ca    9.2      23 Aug 2018 07:08         PTT - ( 20 Aug 2018 16:44 )  PTT:33.3 sec  Urinalysis Basic - ( 20 Aug 2018 19:56 )    Color: Yellow / Appearance: Clear / S.010 / pH: x  Gluc: x / Ketone: Negative  / Bili: Negative / Urobili: Negative   Blood: x / Protein: 25 mg/dL / Nitrite: Negative   Leuk Esterase: Trace / RBC: 0-2 /HPF / WBC 0-2   Sq Epi: x / Non Sq Epi: Occasional / Bacteria: Occasional      CAPILLARY BLOOD GLUCOSE          Urine and Blood Cx: NGTD 18        RADIOLOGY & ADDITIONAL TESTS:    Imaging Personally Reviewed:   IMPRESSION:  Patchy groundglass opacities with consolidations and multiple bilateral   pulmonary nodules as well as diffuse pleural thickening. Based on the   patient's history of non-Hodgkin's disease, these findings may represent   extranodal lymphoma. Differential diagnosis also includes inflammatory or   atypical infectious processes.    Small pericardial effusion. echo-The left ventricular ejection fraction is approximate 60%.  2. There are no gross wall motion abnormalities.  3. There is normal left ventricular wall thickness.  4. There is mild left atrial enlargement.  5. There is diastolic dysfunction.  6. There is classic mitralvalve prolapse with moderate to severe mitral   regurgitation.  7. There is mild to moderate tricuspid regurgitation with a right   ventricular systolic pressure of 39 mmHg consistent with borderline   pulmonary hypertension.  8. There is a trileaflet aortic valve with minimal atherosclerotic change   and normal leaflet excursion without significant aortic valve stenosis or   regurgitation.  9. There is trivial pulmonic insufficiency.      Advance Directives: full code Patient is a 58y old  Female who presents with a chief complaint of Cough, fever and SOB (21 Aug 2018 11:13)      HPI:  59 yo F with PMHx of MVP, unspecified chronic lung disease, non-hodgkin lymphoma s/p chemotherapy presents to ED due to productive cough, fever and SOB. Patient states that 5 days ago she began having chills, dry cough, tachycardia and fever (101). Patient treated her fever with tylenol q6 but other symptoms persisted despite home made prepared tea with pop, thyme and honey. Finally, today she visited her PCP who referred her to [facility] for work up of bacterial pneumonia. Currently patient admits of constant SOB, palpitations, tachycardia, myalgias, productive cough with no hemoptysis, weakness, dizziness with no syncope episodes, nausea with no vomiting and decreased appetite. Patient denies current chest pain, abdominal pain, recent weight loss or night sweats. Patient reports she gets SOB walking up 1 flight of stairs since 1 1/2 year ago, accompanied by chest pain. Chest pain     cough, fever and SOB admitted with sepsis sec to CAP and chest pain r/o acs now found to have bl pulm nodule       INTERVAL HPI/OVERNIGHT EVENTS:  Unable to sleep well last night. No SOB, notes her heart continues to beat quickly and that she feels she has a fever.     Vital Signs Last 24 Hrs  T(C): 37.2 (23 Aug 2018 05:27), Max: 38.4 (22 Aug 2018 20:34)  T(F): 99 (23 Aug 2018 05:27), Max: 101.2 (22 Aug 2018 20:34)  HR: 94 (23 Aug 2018 05:27) (94 - 121)  BP: 113/73 (23 Aug 2018 05:27) (109/68 - 121/81)  BP(mean): --  RR: 18 (23 Aug 2018 05:27) (16 - 18)  SpO2: 94% (23 Aug 2018 05:27) (83% - 97%)        REVIEW OF SYSTEMS:  CONSTITUTIONAL: yes  fever, weight loss,  yes  fatigue  EYES: No eye pain, visual disturbances, or discharge  ENMT:   No sinus or throat pain  NECK: No pain or stiffness  BREASTS: No pain, no masses,   RESPIRATORY: yes  cough, no  wheezing,  mild  shortness of breath  CARDIOVASCULAR: No chest pain, palpitations, dizziness, or leg swelling  GASTROINTESTINAL: No abdominal or epigastric pain. No nausea, vomiting, No diarrhea or constipation.   GENITOURINARY: No dysuria, frequency, hematuria, or incontinence  NEUROLOGICAL: No headaches, memory loss, loss of strength, numbness  MUSCULOSKELETAL: No joint pain or swelling; No muscle, back, or extremity pain      PHYSICAL EXAM:  GENERAL: NAD, well-groomed, well-developed  HEAD:  Atraumatic, Normocephalic  EYES: Conjunctiva and sclera clear  ENMT: Moist mucous membranes,, No lesions  NECK: Supple, No JVD,  NERVOUS SYSTEM:  Alert & Oriented X3 , no focal deficit   CHEST/LUNG:  percussion bilaterally   bl low bases ; +Rales in RLL  HEART: Regular rate and rhythm; No murmurs,  no tachy   ABDOMEN: Soft, Nontender, Nondistended; Bowel sounds present  EXTREMITIES:  2+ Peripheral Pulses, No clubbing, cyanosis, or edema  SKIN: No rashes    MEDICATIONS  (STANDING):  ALBUTerol    0.083% 2.5 milliGRAM(s) Nebulizer every 12 hours  aspirin enteric coated 81 milliGRAM(s) Oral daily  azithromycin  IVPB 500 milliGRAM(s) IV Intermittent every 24 hours  cefTRIAXone   IVPB 1 Gram(s) IV Intermittent every 24 hours  enoxaparin Injectable 40 milliGRAM(s) SubCutaneous daily  lactobacillus acidophilus 1 Tablet(s) Oral three times a day with meals  sodium chloride 0.9%. 1000 milliLiter(s) (100 mL/Hr) IV Continuous <Continuous>    MEDICATIONS  (PRN):  acetaminophen   Tablet 650 milliGRAM(s) Oral every 6 hours PRN For Temp greater than 38 C (100.4 F)  guaiFENesin  milliGRAM(s) Oral every 12 hours PRN Cough      LABS:                 141  |  105  |  10  ----------------------------<  101<H>  3.9   |  29  |  0.66    Ca    9.2      23 Aug 2018 07:08         PTT - ( 20 Aug 2018 16:44 )  PTT:33.3 sec  Urinalysis Basic - ( 20 Aug 2018 19:56 )    Color: Yellow / Appearance: Clear / S.010 / pH: x  Gluc: x / Ketone: Negative  / Bili: Negative / Urobili: Negative   Blood: x / Protein: 25 mg/dL / Nitrite: Negative   Leuk Esterase: Trace / RBC: 0-2 /HPF / WBC 0-2   Sq Epi: x / Non Sq Epi: Occasional / Bacteria: Occasional      CAPILLARY BLOOD GLUCOSE          Urine and Blood Cx: NGTD 18        RADIOLOGY & ADDITIONAL TESTS:    Imaging Personally Reviewed:   no new test     Advance Directives: full code

## 2018-08-25 LAB
CULTURE RESULTS: SIGNIFICANT CHANGE UP
CULTURE RESULTS: SIGNIFICANT CHANGE UP
SPECIMEN SOURCE: SIGNIFICANT CHANGE UP
SPECIMEN SOURCE: SIGNIFICANT CHANGE UP

## 2018-09-05 ENCOUNTER — OUTPATIENT (OUTPATIENT)
Dept: OUTPATIENT SERVICES | Facility: HOSPITAL | Age: 58
LOS: 1 days | End: 2018-09-05
Payer: MEDICARE

## 2018-09-05 ENCOUNTER — APPOINTMENT (OUTPATIENT)
Dept: RADIOLOGY | Facility: CLINIC | Age: 58
End: 2018-09-05

## 2018-09-05 DIAGNOSIS — Z90.49 ACQUIRED ABSENCE OF OTHER SPECIFIED PARTS OF DIGESTIVE TRACT: Chronic | ICD-10-CM

## 2018-09-05 DIAGNOSIS — Z00.8 ENCOUNTER FOR OTHER GENERAL EXAMINATION: ICD-10-CM

## 2018-09-05 PROBLEM — G45.9 TRANSIENT CEREBRAL ISCHEMIC ATTACK, UNSPECIFIED: Chronic | Status: ACTIVE | Noted: 2018-08-20

## 2018-09-05 PROBLEM — I34.1 NONRHEUMATIC MITRAL (VALVE) PROLAPSE: Chronic | Status: ACTIVE | Noted: 2018-08-20

## 2018-09-05 PROCEDURE — 71046 X-RAY EXAM CHEST 2 VIEWS: CPT | Mod: 26

## 2018-09-05 PROCEDURE — 71046 X-RAY EXAM CHEST 2 VIEWS: CPT

## 2018-09-21 ENCOUNTER — APPOINTMENT (OUTPATIENT)
Dept: CT IMAGING | Facility: CLINIC | Age: 58
End: 2018-09-21
Payer: MEDICARE

## 2018-09-21 ENCOUNTER — OUTPATIENT (OUTPATIENT)
Dept: OUTPATIENT SERVICES | Facility: HOSPITAL | Age: 58
LOS: 1 days | End: 2018-09-21
Payer: MEDICARE

## 2018-09-21 DIAGNOSIS — R05 COUGH: ICD-10-CM

## 2018-09-21 DIAGNOSIS — Z90.49 ACQUIRED ABSENCE OF OTHER SPECIFIED PARTS OF DIGESTIVE TRACT: Chronic | ICD-10-CM

## 2018-09-21 PROCEDURE — 71250 CT THORAX DX C-: CPT | Mod: 26

## 2018-09-21 PROCEDURE — 71250 CT THORAX DX C-: CPT

## 2018-10-25 PROCEDURE — 82553 CREATINE MB FRACTION: CPT

## 2018-10-25 PROCEDURE — 86140 C-REACTIVE PROTEIN: CPT

## 2018-10-25 PROCEDURE — 96365 THER/PROPH/DIAG IV INF INIT: CPT

## 2018-10-25 PROCEDURE — 85610 PROTHROMBIN TIME: CPT

## 2018-10-25 PROCEDURE — 84145 PROCALCITONIN (PCT): CPT

## 2018-10-25 PROCEDURE — 83880 ASSAY OF NATRIURETIC PEPTIDE: CPT

## 2018-10-25 PROCEDURE — 85027 COMPLETE CBC AUTOMATED: CPT

## 2018-10-25 PROCEDURE — 94760 N-INVAS EAR/PLS OXIMETRY 1: CPT

## 2018-10-25 PROCEDURE — 87040 BLOOD CULTURE FOR BACTERIA: CPT

## 2018-10-25 PROCEDURE — 80048 BASIC METABOLIC PNL TOTAL CA: CPT

## 2018-10-25 PROCEDURE — 71046 X-RAY EXAM CHEST 2 VIEWS: CPT

## 2018-10-25 PROCEDURE — 96376 TX/PRO/DX INJ SAME DRUG ADON: CPT

## 2018-10-25 PROCEDURE — 94640 AIRWAY INHALATION TREATMENT: CPT

## 2018-10-25 PROCEDURE — 80053 COMPREHEN METABOLIC PANEL: CPT

## 2018-10-25 PROCEDURE — 87899 AGENT NOS ASSAY W/OPTIC: CPT

## 2018-10-25 PROCEDURE — 87798 DETECT AGENT NOS DNA AMP: CPT

## 2018-10-25 PROCEDURE — 83605 ASSAY OF LACTIC ACID: CPT

## 2018-10-25 PROCEDURE — 93306 TTE W/DOPPLER COMPLETE: CPT

## 2018-10-25 PROCEDURE — 96366 THER/PROPH/DIAG IV INF ADDON: CPT

## 2018-10-25 PROCEDURE — 87633 RESP VIRUS 12-25 TARGETS: CPT

## 2018-10-25 PROCEDURE — 84484 ASSAY OF TROPONIN QUANT: CPT

## 2018-10-25 PROCEDURE — 84443 ASSAY THYROID STIM HORMONE: CPT

## 2018-10-25 PROCEDURE — 82550 ASSAY OF CK (CPK): CPT

## 2018-10-25 PROCEDURE — 87486 CHLMYD PNEUM DNA AMP PROBE: CPT

## 2018-10-25 PROCEDURE — 87449 NOS EACH ORGANISM AG IA: CPT

## 2018-10-25 PROCEDURE — 93005 ELECTROCARDIOGRAM TRACING: CPT

## 2018-10-25 PROCEDURE — 71250 CT THORAX DX C-: CPT

## 2018-10-25 PROCEDURE — 99285 EMERGENCY DEPT VISIT HI MDM: CPT | Mod: 25

## 2018-10-25 PROCEDURE — 85730 THROMBOPLASTIN TIME PARTIAL: CPT

## 2018-10-25 PROCEDURE — 96367 TX/PROPH/DG ADDL SEQ IV INF: CPT

## 2018-10-25 PROCEDURE — 87086 URINE CULTURE/COLONY COUNT: CPT

## 2018-10-25 PROCEDURE — 87581 M.PNEUMON DNA AMP PROBE: CPT

## 2018-10-25 PROCEDURE — 81001 URINALYSIS AUTO W/SCOPE: CPT

## 2018-11-21 ENCOUNTER — EMERGENCY (EMERGENCY)
Facility: HOSPITAL | Age: 58
LOS: 1 days | Discharge: HOME | End: 2018-11-21
Attending: EMERGENCY MEDICINE
Payer: MEDICARE

## 2018-11-21 ENCOUNTER — APPOINTMENT (OUTPATIENT)
Dept: MRI IMAGING | Facility: CLINIC | Age: 58
End: 2018-11-21
Payer: MEDICARE

## 2018-11-21 ENCOUNTER — OUTPATIENT (OUTPATIENT)
Dept: OUTPATIENT SERVICES | Facility: HOSPITAL | Age: 58
LOS: 1 days | End: 2018-11-21
Payer: MEDICARE

## 2018-11-21 VITALS
HEART RATE: 18 BPM | HEIGHT: 69 IN | DIASTOLIC BLOOD PRESSURE: 86 MMHG | RESPIRATION RATE: 18 BRPM | TEMPERATURE: 98 F | WEIGHT: 130.07 LBS | SYSTOLIC BLOOD PRESSURE: 151 MMHG | OXYGEN SATURATION: 98 %

## 2018-11-21 DIAGNOSIS — Z90.49 ACQUIRED ABSENCE OF OTHER SPECIFIED PARTS OF DIGESTIVE TRACT: Chronic | ICD-10-CM

## 2018-11-21 DIAGNOSIS — Z00.8 ENCOUNTER FOR OTHER GENERAL EXAMINATION: ICD-10-CM

## 2018-11-21 LAB
ALBUMIN SERPL ELPH-MCNC: 4.8 G/DL — SIGNIFICANT CHANGE UP (ref 3.3–5)
ALP SERPL-CCNC: 95 U/L — SIGNIFICANT CHANGE UP (ref 40–120)
ALT FLD-CCNC: 10 U/L — SIGNIFICANT CHANGE UP (ref 10–45)
ANION GAP SERPL CALC-SCNC: 15 MMOL/L — SIGNIFICANT CHANGE UP (ref 5–17)
APTT BLD: 32.4 SEC — SIGNIFICANT CHANGE UP (ref 27.5–36.3)
AST SERPL-CCNC: 13 U/L — SIGNIFICANT CHANGE UP (ref 10–40)
BILIRUB SERPL-MCNC: 0.3 MG/DL — SIGNIFICANT CHANGE UP (ref 0.2–1.2)
BUN SERPL-MCNC: 18 MG/DL — SIGNIFICANT CHANGE UP (ref 7–23)
CALCIUM SERPL-MCNC: 10 MG/DL — SIGNIFICANT CHANGE UP (ref 8.4–10.5)
CHLORIDE SERPL-SCNC: 102 MMOL/L — SIGNIFICANT CHANGE UP (ref 96–108)
CO2 SERPL-SCNC: 26 MMOL/L — SIGNIFICANT CHANGE UP (ref 22–31)
CREAT SERPL-MCNC: 0.81 MG/DL — SIGNIFICANT CHANGE UP (ref 0.5–1.3)
GLUCOSE SERPL-MCNC: 130 MG/DL — HIGH (ref 70–99)
HCT VFR BLD CALC: 44.2 % — SIGNIFICANT CHANGE UP (ref 34.5–45)
HGB BLD-MCNC: 14.9 G/DL — SIGNIFICANT CHANGE UP (ref 11.5–15.5)
INR BLD: 0.9 RATIO — SIGNIFICANT CHANGE UP (ref 0.88–1.16)
MCHC RBC-ENTMCNC: 31.8 PG — SIGNIFICANT CHANGE UP (ref 27–34)
MCHC RBC-ENTMCNC: 33.7 GM/DL — SIGNIFICANT CHANGE UP (ref 32–36)
MCV RBC AUTO: 94.4 FL — SIGNIFICANT CHANGE UP (ref 80–100)
PLATELET # BLD AUTO: 319 K/UL — SIGNIFICANT CHANGE UP (ref 150–400)
POTASSIUM SERPL-MCNC: 3.6 MMOL/L — SIGNIFICANT CHANGE UP (ref 3.5–5.3)
POTASSIUM SERPL-SCNC: 3.6 MMOL/L — SIGNIFICANT CHANGE UP (ref 3.5–5.3)
PROT SERPL-MCNC: 7.7 G/DL — SIGNIFICANT CHANGE UP (ref 6–8.3)
PROTHROM AB SERPL-ACNC: 10.2 SEC — SIGNIFICANT CHANGE UP (ref 10–12.9)
RBC # BLD: 4.68 M/UL — SIGNIFICANT CHANGE UP (ref 3.8–5.2)
RBC # FLD: 13.5 % — SIGNIFICANT CHANGE UP (ref 10.3–14.5)
SODIUM SERPL-SCNC: 143 MMOL/L — SIGNIFICANT CHANGE UP (ref 135–145)
WBC # BLD: 6.3 K/UL — SIGNIFICANT CHANGE UP (ref 3.8–10.5)
WBC # FLD AUTO: 6.3 K/UL — SIGNIFICANT CHANGE UP (ref 3.8–10.5)

## 2018-11-21 PROCEDURE — 85730 THROMBOPLASTIN TIME PARTIAL: CPT

## 2018-11-21 PROCEDURE — 70551 MRI BRAIN STEM W/O DYE: CPT | Mod: 26

## 2018-11-21 PROCEDURE — 70450 CT HEAD/BRAIN W/O DYE: CPT | Mod: 26

## 2018-11-21 PROCEDURE — 99284 EMERGENCY DEPT VISIT MOD MDM: CPT | Mod: 25

## 2018-11-21 PROCEDURE — 70450 CT HEAD/BRAIN W/O DYE: CPT

## 2018-11-21 PROCEDURE — 70551 MRI BRAIN STEM W/O DYE: CPT

## 2018-11-21 PROCEDURE — 85610 PROTHROMBIN TIME: CPT

## 2018-11-21 PROCEDURE — 80053 COMPREHEN METABOLIC PANEL: CPT

## 2018-11-21 PROCEDURE — 99284 EMERGENCY DEPT VISIT MOD MDM: CPT

## 2018-11-21 PROCEDURE — 85027 COMPLETE CBC AUTOMATED: CPT

## 2018-11-21 RX ORDER — SODIUM CHLORIDE 9 MG/ML
1000 INJECTION INTRAMUSCULAR; INTRAVENOUS; SUBCUTANEOUS
Qty: 0 | Refills: 0 | Status: DISCONTINUED | OUTPATIENT
Start: 2018-11-21 | End: 2018-11-25

## 2018-11-21 RX ADMIN — SODIUM CHLORIDE 50 MILLILITER(S): 9 INJECTION INTRAMUSCULAR; INTRAVENOUS; SUBCUTANEOUS at 21:39

## 2018-11-21 NOTE — ED PROVIDER NOTE - PMH
Mitral prolapse    NHL (non-Hodgkin's lymphoma)  Agem 43 sp chemo/rt/stem cell  Pulmonary disease    Transient cerebral ischemia, unspecified type

## 2018-11-21 NOTE — ED PROVIDER NOTE - OBJECTIVE STATEMENT
Private Physician Joselito Last PCP  58y female pmh MVP, Interstitial lung dz, TIA age 53, NHL age 43,No dm,htn,hld,habits, Pt comes to ed complains of having problem losing feeling on left arm/shoulder/face,left leg, onset several months ago. Pt was seen by pmd and had mri today and told had subdural hematoma. No hx of trauma,no fever chills. no anticogulants. DC asa 8/2018

## 2018-11-21 NOTE — CONSULT NOTE ADULT - SUBJECTIVE AND OBJECTIVE BOX
p (2090)     HPI: Jenny Kwong, 58y female pmh MVP, Interstitial lung dz, TIA age 53, NHL age 43. , Pt comes to ed complains of having problem decreased sensation on left arm/shoulder/face, left leg, onset several months ago. Pt was seen by pmd and had mri today and told had subdural hematoma. No AC/asa. PLT WNL.     PAST MEDICAL HISTORY   NHL (non-Hodgkin's lymphoma)  Transient cerebral ischemia, unspecified type  Mitral prolapse  Pulmonary disease    PAST SURGICAL HISTORY   History of appendectomy    IV Contrast (Anaphylaxis)  shellfish. (Anaphylaxis)      MEDICATIONS:  Antibiotics:    Neuro:    Anticoagulation:    Other:  sodium chloride 0.9%. 1000 milliLiter(s) IV Continuous <Continuous>      SOCIAL HISTORY:   Occupation:   Marital Status:     FAMILY HISTORY:  Family history of stroke (Father)  Family history of breast cancer (Mother)      REVIEW OF SYSTEMS:  Check here if all are normal other than Neurological/HPI [x]  General:  Eyes:  ENT:  Cardiac:  Respiratory:  GI:  Musculoskeletal:   Skin:  Neurologic:   Psychiatric:     PHYSICAL EXAMINATION:   T(C): 36.6 (11-21-18 @ 19:51), Max: 36.7 (11-21-18 @ 19:23)  HR: 82 (11-21-18 @ 19:51) (18 - 82)  BP: 143/88 (11-21-18 @ 19:51) (143/88 - 151/86)  RR: 16 (11-21-18 @ 19:51) (16 - 18)  SpO2: 96% (11-21-18 @ 19:51) (96% - 98%)  Wt(kg): --Height (cm): 175.26 (11-21 @ 19:23)  Weight (kg): 59 (11-21 @ 19:23)    General Examination:     Neurologic Examination:           AOx3, FC, PERRL, EOMI, no facial   5/5 throughout, no drift  SILT  no clonus      LABS:                RADIOLOGY & ADDITIONAL STUDIES: p (6980)     HPI: Jenny Kwong, 58y female pmh MVP, Interstitial lung dz, TIA age 53, NHL age 43. , Pt comes to ed complains of having intermittent episodes of decreased sensation on BUE/BLE for the past several months. Very anxious. Pt was seen by pmd and had mri today and told had subdural hematoma. No AC/asa. PLT WNL.     PAST MEDICAL HISTORY   NHL (non-Hodgkin's lymphoma)  Transient cerebral ischemia, unspecified type  Mitral prolapse  Pulmonary disease    PAST SURGICAL HISTORY   History of appendectomy    IV Contrast (Anaphylaxis)  shellfish. (Anaphylaxis)      MEDICATIONS:  Antibiotics:    Neuro:    Anticoagulation:    Other:  sodium chloride 0.9%. 1000 milliLiter(s) IV Continuous <Continuous>      SOCIAL HISTORY:   Occupation:   Marital Status:     FAMILY HISTORY:  Family history of stroke (Father)  Family history of breast cancer (Mother)      REVIEW OF SYSTEMS:  Check here if all are normal other than Neurological/HPI [x]  General:  Eyes:  ENT:  Cardiac:  Respiratory:  GI:  Musculoskeletal:   Skin:  Neurologic:   Psychiatric:     PHYSICAL EXAMINATION:   T(C): 36.6 (11-21-18 @ 19:51), Max: 36.7 (11-21-18 @ 19:23)  HR: 82 (11-21-18 @ 19:51) (18 - 82)  BP: 143/88 (11-21-18 @ 19:51) (143/88 - 151/86)  RR: 16 (11-21-18 @ 19:51) (16 - 18)  SpO2: 96% (11-21-18 @ 19:51) (96% - 98%)  Wt(kg): --Height (cm): 175.26 (11-21 @ 19:23)  Weight (kg): 59 (11-21 @ 19:23)    General Examination:     Neurologic Examination:           AOx3, FC, PERRL, EOMI, no facial   5/5 throughout, no drift  SILT  no clonus      LABS:                RADIOLOGY & ADDITIONAL STUDIES: p (1520)     HPI: Jenny wKong, 58y female pmh MVP, Interstitial lung dz, TIA age 53, NHL age 43. , Pt comes to ed complains of having intermittent episodes of decreased sensation on BUE/BLE for the past several months. Very anxious. Pt was seen by pmd and had mri today and told had subdural hematoma. No AC/asa. PLT WNL. CTH official read showed small bl csdh.    PAST MEDICAL HISTORY   NHL (non-Hodgkin's lymphoma)  Transient cerebral ischemia, unspecified type  Mitral prolapse  Pulmonary disease    PAST SURGICAL HISTORY   History of appendectomy    IV Contrast (Anaphylaxis)  shellfish. (Anaphylaxis)      MEDICATIONS:  Antibiotics:    Neuro:    Anticoagulation:    Other:  sodium chloride 0.9%. 1000 milliLiter(s) IV Continuous <Continuous>      SOCIAL HISTORY:   Occupation:   Marital Status:     FAMILY HISTORY:  Family history of stroke (Father)  Family history of breast cancer (Mother)      REVIEW OF SYSTEMS:  Check here if all are normal other than Neurological/HPI [x]  General:  Eyes:  ENT:  Cardiac:  Respiratory:  GI:  Musculoskeletal:   Skin:  Neurologic:   Psychiatric:     PHYSICAL EXAMINATION:   T(C): 36.6 (11-21-18 @ 19:51), Max: 36.7 (11-21-18 @ 19:23)  HR: 82 (11-21-18 @ 19:51) (18 - 82)  BP: 143/88 (11-21-18 @ 19:51) (143/88 - 151/86)  RR: 16 (11-21-18 @ 19:51) (16 - 18)  SpO2: 96% (11-21-18 @ 19:51) (96% - 98%)  Wt(kg): --Height (cm): 175.26 (11-21 @ 19:23)  Weight (kg): 59 (11-21 @ 19:23)    General Examination:     Neurologic Examination:           AOx3, FC, PERRL, EOMI, no facial   5/5 throughout, no drift  SILT  no clonus      LABS:                RADIOLOGY & ADDITIONAL STUDIES:

## 2018-11-21 NOTE — ED PROVIDER NOTE - PROGRESS NOTE DETAILS
NSX paged  Khalif Ta MD, Facep Pt refused keppra dose here, patient understands risk does not want to take the seizure prophylaxis and refused steroids secondary to psychosis in past. Will have pt follow up with Neurology and NS.

## 2018-11-21 NOTE — ED ADULT NURSE NOTE - NSIMPLEMENTINTERV_GEN_ALL_ED
Implemented All Fall Risk Interventions:  [place] to call system. Call bell, personal items and telephone within reach. Instruct patient to call for assistance. Room bathroom lighting operational. Non-slip footwear when patient is off stretcher. Physically safe environment: no spills, clutter or unnecessary equipment. Stretcher in lowest position, wheels locked, appropriate side rails in place. Provide visual cue, wrist band, yellow gown, etc. Monitor gait and stability. Monitor for mental status changes and reorient to person, place, and time. Review medications for side effects contributing to fall risk. Reinforce activity limits and safety measures with patient and family.

## 2018-11-21 NOTE — CONSULT NOTE ADULT - ASSESSMENT
Jenny Kwong, 58y female pmh MVP, Interstitial lung dz, TIA age 53, NHL age 43. , Pt comes to ed complains of having problem decreased sensation on left arm/shoulder/face, left leg, onset several months ago. Pt was seen by pmd and had mri today and told had subdural hematoma. No AC/asa. PLT WNL.    Plan: repeat cth, keppra 1 g bid x7 days, dex 4bid, may fu outpatient 1-2 weeks after discharge with Dr. Reeves Jenny Kwong, 58y female pmh MVP, Interstitial lung dz, TIA age 53, NHL age 43. , Pt comes to ed complains of having problem decreased sensation on left arm/shoulder/face, left leg, onset several months ago. Pt was seen by pmd and had mri today and told had subdural hematoma. No AC/asa. PLT WNL.    Plan: repeat cth, keppra 1 g bid x7 days, dex 4bid, may fu outpatient 1-2 weeks after discharge with Dr. Reeves, neurology eval for tia but hold all ac/antiplt until outpatient fu Jenny Kwong, 58y female pmh MVP, Interstitial lung dz, TIA age 53, NHL age 43. , Pt comes to ed complains of having problem decreased sensation on left arm/shoulder/face, left leg, onset several months ago. Pt was seen by pmd and had mri today and told had subdural hematoma. No AC/asa. PLT WNL.    Plan: repeat cth grossly stable, keppra 1 g bid x7 days, dex 4bid, may fu outpatient 1-2 weeks after discharge with Dr. Reeves, neurology eval for tia but hold all ac/antiplt until outpatient fu

## 2018-11-21 NOTE — CONSULT NOTE ADULT - ASSESSMENT
58 year old female presenting with MRI findings of SDH. Patient has PMH of NHL, right heart cath, meniere's. States for the past 4 months had intermittent sensation of numbness/coldness of her face, arm, legs which started after she was hospitalized with pneumonia. Denies headache, changes in vision, changes in hearing. PMD performed MRI brain which showed concern for SDH. 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 Denies head trauma.  Neuro exam with subjective decreased sensation on left lower extremity compared to right.  CTH showed chronic-appearing bilateral frontoparietal subdural hematomas, not significantly changed in size, measuring up to 6 mm in greatest width on the right and 5 mm on the left.  MRI brain showed Subdural hematomas are seen involving both temporal frontal and parietal region. The subdural on the right side measures approximately 6 mm in widest diameter and subdural hematoma on the left side measures approximately 6.5 mm and widest diameter. Subdural hematoma along the interhemispheric region is again seen as well.     No acute infarction seen, chronic subdural hematomas vs hygromas    Recommend:  Management as per neurosurgery  Can follow up outpatient with Dr. Wyman [phone]

## 2018-11-21 NOTE — CONSULT NOTE ADULT - ATTENDING COMMENTS
I reviewed the resident's note and agree. The patient is a 58-year-old female who presents with intermittent upper and lower extremity numbness, found to have small bilateral subdural collections. The patient is currently neurologically stable. No acute neurosurgical intervention is indicated at this time.

## 2018-11-21 NOTE — ED ADULT NURSE REASSESSMENT NOTE - NS ED NURSE REASSESS COMMENT FT1
Patient resting comfortably in bed, awaiting neuro eval. Patient ambulatory to bathroom x 1 assistance. VSS. Patient and family aware of plan of care.

## 2018-11-21 NOTE — ED ADULT TRIAGE NOTE - CHIEF COMPLAINT QUOTE
numbness arms, legs, face x 2 months; in hosp 3 weeks ago for card cath; saw MD due to numbness; MRI done today ; MD called to come in to ED due to MRI showing bleeding between skull and brain; pt reports feels week and unsteady

## 2018-11-21 NOTE — ED PROVIDER NOTE - NSFOLLOWUPINSTRUCTIONS_ED_ALL_ED_FT
1. Return to ED for worsening, progressive or any other concerning symptoms   2. Follow up with your primary care doctor in 2-3days  3. Follow up with Dr. Reeves for Neurosurgery in 2 weeks number is attached.  4. Follow up with Dr. Wyman [phone] for appointment with neurology.  5. Take keppra 1g twice a day for next 7 days.

## 2018-11-21 NOTE — CONSULT NOTE ADULT - SUBJECTIVE AND OBJECTIVE BOX
Neurology Consult    Reason for consult: Subdural hygromas    HPI: Patient is a 58 year old female presenting with MRI findings of SDH. Patient has PMH of NHL, right heart cath, meniere's. States for the past 4 months had intermittent sensation of numbness/coldness of her face, arm, legs which started after she was hospitalized with pneumonia. Denies headache, changes in vision, changes in hearing. PMD performed MRI brain which showed concern for SDH. 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 Denies head trauma.    REVIEW OF SYSTEMS:  Constitutional: No fever, chills, fatigue, weakness.  Eyes: No eye pain, visual disturbances, or discharge.  ENT:  No difficulty hearing, tinnitus, vertigo. No sinus or throat pain.  Neck: No pain or stiffness.  Respiratory: No cough, dyspnea, wheezing.  Cardiovascular: No chest pain, palpitations.  Gastrointestinal: No abdominal pain. No nausea, vomiting, diarrhea, or constipation.   Genitourinary: No dysuria, frequency, hematuria or incontinence.  Neurological: No headaches, lightheadedness, vertigo, or tremors. Numbness  Psychiatric: No depression, anxiety, mood swings, or difficulty sleeping.  Musculoskeletal: No joint pain or swelling. No muscle, back, or extremity pain.  Skin: No itching, burning, rashes or lesions.   Lymph Nodes: No enlarged glands  Endocrine: No heat or cold intolerance, no hair loss.  Allergy and Immunologic: No hives or eczema.    MEDICATIONS  sodium chloride 0.9%. 1000 milliLiter(s) IV Continuous <Continuous>    PMH: NHL (non-Hodgkin's lymphoma)right heart   Mitral prolapse  Pulmonary disease     PSH: History of appendectomy    FAMILY HISTORY:  Family history of stroke (Father)  Family history of breast cancer (Mother): Mother  No history of dementia, strokes, or seizures     SOCIAL HISTORY:  No history of tobacco or alcohol use     Allergies  IV Contrast (Anaphylaxis)  shellfish. (Anaphylaxis)    Vital Signs Last 24 Hrs  T(C): 37.1 (21 Nov 2018 22:45), Max: 37.1 (21 Nov 2018 22:45)  T(F): 98.7 (21 Nov 2018 22:45), Max: 98.7 (21 Nov 2018 22:45)  HR: 74 (21 Nov 2018 22:45) (18 - 82)  BP: 127/81 (21 Nov 2018 22:45) (127/81 - 151/86)  RR: 16 (21 Nov 2018 22:45) (16 - 18)  SpO2: 99% (21 Nov 2018 22:45) (96% - 99%)    Neurological Examination:    Mental Status: Patient is alert, awake, oriented X3. Patient is fluent, no dysarthria, no aphasia. Follows commands well and able to answer questions appropriately. Mood and affect normal.    Cranial Nerves: PERRL, EOMI, visual field intact, V1-V3 intact, no gross facial asymmetry, tongue/uvula midline    Motor Exam: No drift  Right upper extremity: 5/5  Left upper extremity: 5/5  Right lower extremity: 5/5  Left lower extremity: 5/5    Normal bulk/tone    Sensory: Decreased light touch on LLE. No extinction    Coordination: Finger to nose intact bilaterally     Reflexes: Bilateral 2+ Biceps, Brachial, Patellar    GENERAL: No acute distress  HEENT:  Normocephalic, atraumatic  EXTREMITIES: No edema, clubbing, cyanosis  MUSCULOSKELETAL: Normal range of motion  SKIN: No rashes    LABS:  CBC Full  -  ( 21 Nov 2018 20:47 )  WBC Count : 6.3 K/uL  Hemoglobin : 14.9 g/dL  Hematocrit : 44.2 %  Platelet Count - Automated : 319 K/uL  Mean Cell Volume : 94.4 fl  Mean Cell Hemoglobin : 31.8 pg  Mean Cell Hemoglobin Concentration : 33.7 gm/dL    11-21    143  |  102  |  18  ----------------------------<  130<H>  3.6   |  26  |  0.81    Ca    10.0      21 Nov 2018 20:47    TPro  7.7  /  Alb  4.8  /  TBili  0.3  /  DBili  x   /  AST  13  /  ALT  10  /  AlkPhos  95  11-21    LIVER FUNCTIONS - ( 21 Nov 2018 20:47 )  Alb: 4.8 g/dL / Pro: 7.7 g/dL / ALK PHOS: 95 U/L / ALT: 10 U/L / AST: 13 U/L / GGT: x           PT/INR - ( 21 Nov 2018 20:47 )   PT: 10.2 sec;   INR: 0.90 ratio       PTT - ( 21 Nov 2018 20:47 )  PTT:32.4 sec

## 2018-11-21 NOTE — ED CLERICAL - NS ED CLERK NOTE PRE-ARRIVAL INFORMATION; ADDITIONAL PRE-ARRIVAL INFORMATION
CC/Reason For referral: Headache x 2 weeks Tingling L Arm  Preferred Consultant(if applicable):  Who admits for you (if needed):  Do you have documents you would like to fax over?  Would you still like to speak to an ED attending? Yes    Note: MRI today [facility]

## 2018-11-21 NOTE — ED ADULT NURSE NOTE - OBJECTIVE STATEMENT
58 year old female patient sent to ED by PMD c/p numbness arms, legs, face x 2 months. Patient had MRI done today at 3pm and was called by MD to come in to ED due to MRI showing bleeding between skull and brain. Patient denies recent falls or trauma but states that she feels unsteady on her feet. Patient reporting intermitting HA, and LUE numbness and tingling - denies currently. Denies current HA, dizziness, CP, SOB, palpitations, abd pain, n/v/d, fever, chills. Patient states she has been taking tylenol for HA with improvement- last dose about 230pm. Patient well appearing with no acute distress noted. VSS. Patient and family aware of plan of care.

## 2018-11-22 VITALS
RESPIRATION RATE: 16 BRPM | SYSTOLIC BLOOD PRESSURE: 134 MMHG | OXYGEN SATURATION: 100 % | TEMPERATURE: 98 F | DIASTOLIC BLOOD PRESSURE: 76 MMHG | HEART RATE: 76 BPM

## 2018-11-22 RX ORDER — LEVETIRACETAM 250 MG/1
1 TABLET, FILM COATED ORAL
Qty: 14 | Refills: 0 | OUTPATIENT
Start: 2018-11-22 | End: 2018-11-28

## 2018-11-28 ENCOUNTER — APPOINTMENT (OUTPATIENT)
Dept: NUCLEAR MEDICINE | Facility: IMAGING CENTER | Age: 58
End: 2018-11-28

## 2018-11-28 PROBLEM — C85.90 NON-HODGKIN LYMPHOMA, UNSPECIFIED, UNSPECIFIED SITE: Chronic | Status: ACTIVE | Noted: 2018-11-21

## 2018-12-02 ENCOUNTER — APPOINTMENT (OUTPATIENT)
Dept: MRI IMAGING | Facility: CLINIC | Age: 58
End: 2018-12-02

## 2018-12-05 ENCOUNTER — APPOINTMENT (OUTPATIENT)
Dept: MRI IMAGING | Facility: CLINIC | Age: 58
End: 2018-12-05

## 2018-12-05 ENCOUNTER — OUTPATIENT (OUTPATIENT)
Dept: OUTPATIENT SERVICES | Facility: HOSPITAL | Age: 58
LOS: 1 days | End: 2018-12-05
Payer: MEDICARE

## 2018-12-05 DIAGNOSIS — Z90.49 ACQUIRED ABSENCE OF OTHER SPECIFIED PARTS OF DIGESTIVE TRACT: Chronic | ICD-10-CM

## 2018-12-05 DIAGNOSIS — Z00.8 ENCOUNTER FOR OTHER GENERAL EXAMINATION: ICD-10-CM

## 2018-12-05 PROCEDURE — 72141 MRI NECK SPINE W/O DYE: CPT

## 2018-12-05 PROCEDURE — 72141 MRI NECK SPINE W/O DYE: CPT | Mod: 26

## 2018-12-06 NOTE — CONSULT NOTE ADULT - CONSULT REQUESTED DATE/TIME
Telephone Encounter by Cassi Antunez at 02/27/18 10:41 AM     Author:  Cassi Antunez Service:  (none) Author Type:  Patient      Filed:  02/27/18 10:41 AM Encounter Date:  2/26/2018 Status:  Signed     :  Cassi Antunez (Patient )            Patient calling in regarding status of medication order please advise[MM1.1M]       Revision History        User Key Date/Time User Provider Type Action    > MM1.1 02/27/18 10:41 AM Cassi Antunez Patient  Sign    M - Manual             21-Nov-2018 23:50

## 2018-12-17 PROBLEM — S06.5X9A SUBDURAL HEMATOMA: Status: ACTIVE | Noted: 2018-12-17

## 2018-12-18 ENCOUNTER — APPOINTMENT (OUTPATIENT)
Dept: NEUROSURGERY | Facility: CLINIC | Age: 58
End: 2018-12-18

## 2018-12-18 DIAGNOSIS — S06.5X9A TRAUMATIC SUBDURAL HEMORRHAGE WITH LOSS OF CONSCIOUSNESS OF UNSPECIFIED DURATION, INITIAL ENCOUNTER: ICD-10-CM

## 2018-12-26 ENCOUNTER — APPOINTMENT (OUTPATIENT)
Dept: ULTRASOUND IMAGING | Facility: CLINIC | Age: 58
End: 2018-12-26

## 2019-02-01 ENCOUNTER — INPATIENT (INPATIENT)
Facility: HOSPITAL | Age: 59
LOS: 0 days | Discharge: HOME | End: 2019-02-02
Attending: THORACIC SURGERY (CARDIOTHORACIC VASCULAR SURGERY) | Admitting: THORACIC SURGERY (CARDIOTHORACIC VASCULAR SURGERY)
Payer: MEDICARE

## 2019-02-01 ENCOUNTER — TRANSCRIPTION ENCOUNTER (OUTPATIENT)
Age: 59
End: 2019-02-01

## 2019-02-01 VITALS
DIASTOLIC BLOOD PRESSURE: 71 MMHG | TEMPERATURE: 98 F | SYSTOLIC BLOOD PRESSURE: 147 MMHG | HEART RATE: 134 BPM | OXYGEN SATURATION: 100 % | RESPIRATION RATE: 22 BRPM

## 2019-02-01 DIAGNOSIS — J93.9 PNEUMOTHORAX, UNSPECIFIED: ICD-10-CM

## 2019-02-01 DIAGNOSIS — Z90.49 ACQUIRED ABSENCE OF OTHER SPECIFIED PARTS OF DIGESTIVE TRACT: Chronic | ICD-10-CM

## 2019-02-01 DIAGNOSIS — J93.83 OTHER PNEUMOTHORAX: ICD-10-CM

## 2019-02-01 LAB
ANION GAP SERPL CALC-SCNC: 14 MMO/L — SIGNIFICANT CHANGE UP (ref 7–14)
APTT BLD: 34 SEC — SIGNIFICANT CHANGE UP (ref 27.5–36.3)
BASE EXCESS BLDV CALC-SCNC: 6.5 MMOL/L — SIGNIFICANT CHANGE UP
BASOPHILS # BLD AUTO: 0.05 K/UL — SIGNIFICANT CHANGE UP (ref 0–0.2)
BASOPHILS NFR BLD AUTO: 0.5 % — SIGNIFICANT CHANGE UP (ref 0–2)
BLOOD GAS VENOUS - CREATININE: 0.85 MG/DL — SIGNIFICANT CHANGE UP (ref 0.5–1.3)
BUN SERPL-MCNC: 14 MG/DL — SIGNIFICANT CHANGE UP (ref 7–23)
CALCIUM SERPL-MCNC: 10.6 MG/DL — HIGH (ref 8.4–10.5)
CHLORIDE BLDV-SCNC: 102 MMOL/L — SIGNIFICANT CHANGE UP (ref 96–108)
CHLORIDE SERPL-SCNC: 97 MMOL/L — LOW (ref 98–107)
CO2 SERPL-SCNC: 29 MMOL/L — SIGNIFICANT CHANGE UP (ref 22–31)
CREAT SERPL-MCNC: 0.93 MG/DL — SIGNIFICANT CHANGE UP (ref 0.5–1.3)
EOSINOPHIL # BLD AUTO: 0.1 K/UL — SIGNIFICANT CHANGE UP (ref 0–0.5)
EOSINOPHIL NFR BLD AUTO: 1 % — SIGNIFICANT CHANGE UP (ref 0–6)
GAS PNL BLDV: 140 MMOL/L — SIGNIFICANT CHANGE UP (ref 136–146)
GLUCOSE BLDV-MCNC: 98 — SIGNIFICANT CHANGE UP (ref 70–99)
GLUCOSE SERPL-MCNC: 91 MG/DL — SIGNIFICANT CHANGE UP (ref 70–99)
HCO3 BLDV-SCNC: 27 MMOL/L — SIGNIFICANT CHANGE UP (ref 20–27)
HCT VFR BLD CALC: 46.9 % — HIGH (ref 34.5–45)
HCT VFR BLDV CALC: 48.2 % — HIGH (ref 34.5–45)
HGB BLD-MCNC: 15.3 G/DL — SIGNIFICANT CHANGE UP (ref 11.5–15.5)
HGB BLDV-MCNC: 15.8 G/DL — HIGH (ref 11.5–15.5)
IMM GRANULOCYTES NFR BLD AUTO: 0.3 % — SIGNIFICANT CHANGE UP (ref 0–1.5)
INR BLD: 0.91 — SIGNIFICANT CHANGE UP (ref 0.88–1.17)
LACTATE BLDV-MCNC: 1.6 MMOL/L — SIGNIFICANT CHANGE UP (ref 0.5–2)
LYMPHOCYTES # BLD AUTO: 0.99 K/UL — LOW (ref 1–3.3)
LYMPHOCYTES # BLD AUTO: 10.1 % — LOW (ref 13–44)
MCHC RBC-ENTMCNC: 31.2 PG — SIGNIFICANT CHANGE UP (ref 27–34)
MCHC RBC-ENTMCNC: 32.6 % — SIGNIFICANT CHANGE UP (ref 32–36)
MCV RBC AUTO: 95.5 FL — SIGNIFICANT CHANGE UP (ref 80–100)
MONOCYTES # BLD AUTO: 0.74 K/UL — SIGNIFICANT CHANGE UP (ref 0–0.9)
MONOCYTES NFR BLD AUTO: 7.5 % — SIGNIFICANT CHANGE UP (ref 2–14)
NEUTROPHILS # BLD AUTO: 7.92 K/UL — HIGH (ref 1.8–7.4)
NEUTROPHILS NFR BLD AUTO: 80.6 % — HIGH (ref 43–77)
NRBC # FLD: 0 K/UL — LOW (ref 25–125)
PCO2 BLDV: 57 MMHG — HIGH (ref 41–51)
PH BLDV: 7.37 PH — SIGNIFICANT CHANGE UP (ref 7.32–7.43)
PLATELET # BLD AUTO: 301 K/UL — SIGNIFICANT CHANGE UP (ref 150–400)
PMV BLD: 9 FL — SIGNIFICANT CHANGE UP (ref 7–13)
PO2 BLDV: < 24 MMHG — LOW (ref 35–40)
POTASSIUM BLDV-SCNC: 4.2 MMOL/L — SIGNIFICANT CHANGE UP (ref 3.4–4.5)
POTASSIUM SERPL-MCNC: 3.8 MMOL/L — SIGNIFICANT CHANGE UP (ref 3.5–5.3)
POTASSIUM SERPL-SCNC: 3.8 MMOL/L — SIGNIFICANT CHANGE UP (ref 3.5–5.3)
PROTHROM AB SERPL-ACNC: 10.1 SEC — SIGNIFICANT CHANGE UP (ref 9.8–13.1)
RBC # BLD: 4.91 M/UL — SIGNIFICANT CHANGE UP (ref 3.8–5.2)
RBC # FLD: 13.1 % — SIGNIFICANT CHANGE UP (ref 10.3–14.5)
SAO2 % BLDV: 31.4 % — LOW (ref 60–85)
SODIUM SERPL-SCNC: 140 MMOL/L — SIGNIFICANT CHANGE UP (ref 135–145)
WBC # BLD: 9.83 K/UL — SIGNIFICANT CHANGE UP (ref 3.8–10.5)
WBC # FLD AUTO: 9.83 K/UL — SIGNIFICANT CHANGE UP (ref 3.8–10.5)

## 2019-02-01 PROCEDURE — 71046 X-RAY EXAM CHEST 2 VIEWS: CPT | Mod: 26

## 2019-02-01 PROCEDURE — 99285 EMERGENCY DEPT VISIT HI MDM: CPT

## 2019-02-01 PROCEDURE — 71250 CT THORAX DX C-: CPT | Mod: 26

## 2019-02-01 RX ORDER — DILTIAZEM HCL 120 MG
120 CAPSULE, EXT RELEASE 24 HR ORAL DAILY
Qty: 0 | Refills: 0 | Status: DISCONTINUED | OUTPATIENT
Start: 2019-02-01 | End: 2019-02-02

## 2019-02-01 RX ORDER — ACETAMINOPHEN 500 MG
650 TABLET ORAL ONCE
Qty: 0 | Refills: 0 | Status: COMPLETED | OUTPATIENT
Start: 2019-02-01 | End: 2019-02-01

## 2019-02-01 RX ORDER — SENNA PLUS 8.6 MG/1
2 TABLET ORAL AT BEDTIME
Qty: 0 | Refills: 0 | Status: DISCONTINUED | OUTPATIENT
Start: 2019-02-01 | End: 2019-02-02

## 2019-02-01 RX ORDER — DOCUSATE SODIUM 100 MG
100 CAPSULE ORAL THREE TIMES A DAY
Qty: 0 | Refills: 0 | Status: DISCONTINUED | OUTPATIENT
Start: 2019-02-01 | End: 2019-02-02

## 2019-02-01 RX ORDER — SODIUM CHLORIDE 9 MG/ML
1000 INJECTION, SOLUTION INTRAVENOUS
Qty: 0 | Refills: 0 | Status: DISCONTINUED | OUTPATIENT
Start: 2019-02-01 | End: 2019-02-02

## 2019-02-01 RX ORDER — DILTIAZEM HCL 120 MG
120 CAPSULE, EXT RELEASE 24 HR ORAL DAILY
Qty: 0 | Refills: 0 | Status: DISCONTINUED | OUTPATIENT
Start: 2019-02-01 | End: 2019-02-01

## 2019-02-01 RX ORDER — TIOTROPIUM BROMIDE 18 UG/1
1 CAPSULE ORAL; RESPIRATORY (INHALATION) DAILY
Qty: 0 | Refills: 0 | Status: DISCONTINUED | OUTPATIENT
Start: 2019-02-01 | End: 2019-02-02

## 2019-02-01 RX ORDER — INFLUENZA VIRUS VACCINE 15; 15; 15; 15 UG/.5ML; UG/.5ML; UG/.5ML; UG/.5ML
0.5 SUSPENSION INTRAMUSCULAR ONCE
Qty: 0 | Refills: 0 | Status: DISCONTINUED | OUTPATIENT
Start: 2019-02-01 | End: 2019-02-02

## 2019-02-01 RX ORDER — HEPARIN SODIUM 5000 [USP'U]/ML
5000 INJECTION INTRAVENOUS; SUBCUTANEOUS EVERY 8 HOURS
Qty: 0 | Refills: 0 | Status: DISCONTINUED | OUTPATIENT
Start: 2019-02-01 | End: 2019-02-02

## 2019-02-01 RX ORDER — IPRATROPIUM/ALBUTEROL SULFATE 18-103MCG
3 AEROSOL WITH ADAPTER (GRAM) INHALATION EVERY 6 HOURS
Qty: 0 | Refills: 0 | Status: DISCONTINUED | OUTPATIENT
Start: 2019-02-01 | End: 2019-02-02

## 2019-02-01 RX ORDER — LACTOBACILLUS ACIDOPHILUS 100MM CELL
1 CAPSULE ORAL DAILY
Qty: 0 | Refills: 0 | Status: DISCONTINUED | OUTPATIENT
Start: 2019-02-01 | End: 2019-02-02

## 2019-02-01 RX ORDER — BUDESONIDE, MICRONIZED 100 %
0.25 POWDER (GRAM) MISCELLANEOUS DAILY
Qty: 0 | Refills: 0 | Status: DISCONTINUED | OUTPATIENT
Start: 2019-02-01 | End: 2019-02-02

## 2019-02-01 RX ADMIN — Medication 650 MILLIGRAM(S): at 16:10

## 2019-02-01 RX ADMIN — Medication 650 MILLIGRAM(S): at 15:30

## 2019-02-01 RX ADMIN — SODIUM CHLORIDE 50 MILLILITER(S): 9 INJECTION, SOLUTION INTRAVENOUS at 19:26

## 2019-02-01 RX ADMIN — SODIUM CHLORIDE 50 MILLILITER(S): 9 INJECTION, SOLUTION INTRAVENOUS at 23:35

## 2019-02-01 NOTE — ED PROVIDER NOTE - NS ED ROS FT
Constitutional: no fevers, no chills.  Eyes: no visual changes.  Ears: no ear drainage, no ear pain.  Nose: no nasal congestion.  Mouth/Throat: no sore throat.  Cardiovascular: no chest pain.  Respiratory: no shortness of breath, no wheezing, no cough +pneumothorax  Gastrointestinal: no nausea, no vomiting, no diarrhea, no abdominal pain.  MSK: no flank pain, no back pain.  Genitourinary: no dysuria, no hematuria.  Skin: no rashes.  Neuro: no headache, +r neck, arm and chest pain  Psychiatric: no known mental health issues.

## 2019-02-01 NOTE — DISCHARGE NOTE ADULT - CARE PROVIDER_API CALL
Srinivas Lanza)  Thoracic Surgery  [address] [place] [address]  Phone: [phone]  Fax: [phone]

## 2019-02-01 NOTE — ED ADULT NURSE NOTE - OBJECTIVE STATEMENT
Pt rcvd to room 7 c/o R sided CP near neck. Hx: NHL, Pulmonary disease, R pneumothorax, PNA. Uses 2L NC at home PRN. States usually uses O2 when exerting herself. Pt states that she had PNA in Aug 2018 and then developed a pneumothorax. Pt has not been feeling well lately, having cough. Denies fever. States she saw pulmonologist and had CT done and told to come to ER for possible worsening Pneumothorax. Pt met at bedside by CT sx PA for eval. Pt is Aox4, ambulatory, respirations shallow, mildly labored. Sinus tachycardic on cardiac monitor. Pt states this is her baseline, takes diltiazem for rhythm. Awaiting ED MD madrigal. Will continue to monitor.

## 2019-02-01 NOTE — DISCHARGE NOTE ADULT - MEDICATION SUMMARY - MEDICATIONS TO TAKE
I will START or STAY ON the medications listed below when I get home from the hospital:    aspirin 81 mg oral delayed release tablet  -- 1 tab(s) by mouth once a day  -- Indication: For Antiplatelet    acetaminophen 325 mg oral tablet  -- 2 tab(s) by mouth every 6 hours, As needed, Mild Pain (1 - 3)  -- Indication: For Pain    DILTIAZEM HYDROCHLORIDE  MG CP24  -- Indication: For heart    GABAPENTIN 100 MG CAPS  -- Indication: For Pain    LEVALBUTEROL TARTRATE HFA 45 MCG/ACT AERO  -- Indication: For breathing    SPIRIVA RESPIMAT 2.5 MCG/ACT AERS  -- Indication: For breathing    cefuroxime 500 mg oral tablet  -- 1 tab(s) by mouth 2 times a day   -- Finish all this medication unless otherwise directed by prescriber.  Medication should be taken with plenty of water.  Take with food or milk.    -- Indication: For Only take if you were taking prior to coming to hospital.     senna oral tablet  -- 2 tab(s) by mouth once a day (at bedtime), As needed, Constipation  -- Indication: For constipation if needed    docusate sodium 100 mg oral capsule  -- 1 cap(s) by mouth 3 times a day  -- Indication: For constipatpion if needed    lactobacillus acidophilus oral capsule  -- 1 tab(s) by mouth 3 times a day (before meals)  -- Indication: For Probiotic

## 2019-02-01 NOTE — DISCHARGE NOTE ADULT - HOSPITAL COURSE
58F with PMH of MVP, chronic subdural hematoma, interstitial lung disease, non-hodgkin's lymphoma and PSH of appendectomy who sent in by Dr. Lanza for R apical pneumothorax on CXR done 1/29/19.  Patient reports some pleuritic chest pain on the right. Outpatient chest xrays dating to 1/18/19 demonstrated a right apical pneumothorax, reported to be increasing in size over time. Planned for drainage of pneumothorax. Patient was on CT table, initial CT scan demonstrated a small pneumothorax. Imaging was again reviewed and  case discussed with Dr. Lanza by Dr. Yanez. The case was ended at that time and no intervention was performed as the patient is hemodynamically stable, and without respiratory distress or supplemental oxygen requirement.  Patient was admitted for observation and repeat chest x-ray. 58F with PMH of MVP, chronic subdural hematoma, interstitial lung disease, non-hodgkin's lymphoma and PSH of appendectomy who sent in by Dr. Lanza for R apical pneumothorax on CXR done 1/29/19.  Patient reports some pleuritic chest pain on the right. Outpatient chest xrays dating to 1/18/19 demonstrated a right apical pneumothorax, reported to be increasing in size over time. Planned for drainage of pneumothorax. Patient was on CT table, initial CT scan demonstrated a small pneumothorax. Imaging was again reviewed and  case discussed with Dr. Lanza by Dr. Yanez. The case was ended at that time and no intervention was performed as the patient is hemodynamically stable, and without respiratory distress or supplemental oxygen requirement.  Patient was admitted for observation and repeat chest x-ray.  CXR today stable. No issues overnight. No inc CP or SOB. Feels well. Ambulating. Cleared for d/c to home by Dr. Roy and Dr. Lanza  ICU Vital Signs Last 24 Hrs  T(C): 36.6 (02 Feb 2019 08:32), Max: 36.9 (01 Feb 2019 23:21)  T(F): 97.9 (02 Feb 2019 08:32), Max: 98.4 (01 Feb 2019 23:21)  HR: 76 (02 Feb 2019 08:32) (63 - 134)  BP: 103/65 (02 Feb 2019 08:32) (103/65 - 147/71)  BP(mean): --  ABP: --  ABP(mean): --  RR: 18 (02 Feb 2019 08:32) (18 - 24)  SpO2: 96% (02 Feb 2019 08:32) (96% - 100%)

## 2019-02-01 NOTE — PROGRESS NOTE ADULT - SUBJECTIVE AND OBJECTIVE BOX
Interventional radiology consulted for drainage of pneumothorax.     Post procedure:     58 year old female with history of mitral valve prolapse , chronic subdural hematoma, interstitial lung disease and non-hodgkin's lymphoma presents with spontaneous pneumothorax.     Patient reports right sided chest and neck pain with radiation to the right arm. Outpatient chest xrays dating to 1/18/19 demonstrated a right apical pneumothorax, reported to be increasing in size over time. Planned for drainage of pneumothorax.     Patient was on CT table, initial CT scan demonstrated a small pneumothorax. Imaging was again reviewed and  case discussed with Dr. Lanza by Dr. Yanez. The case was ended at that time and no intervention was performed as the patient is hemodynamically stable, and without respiratory distress or supplemental oxygen requirement. Family made aware.     Patient to be admitted for observation overnight.     Please feel free to call interventional radiology with any questions. Pager [phone]    Nj Joaquin MD  [id number]

## 2019-02-01 NOTE — CHART NOTE - NSCHARTNOTEFT_GEN_A_CORE
Pt is scheduled for R PTC placement for R PTX  case d/w IR attending, Dr Yanez  pt is aware of procedure and consentable  she does not take anticoagulation or antiplatelet medications    Poppy TABARES [id number]

## 2019-02-01 NOTE — H&P ADULT - NSHPPHYSICALEXAM_GEN_ALL_CORE
ICU Vital Signs Last 24 Hrs  T(C): 36.7 (01 Feb 2019 15:02), Max: 36.7 (01 Feb 2019 14:04)  T(F): 98 (01 Feb 2019 15:02), Max: 98 (01 Feb 2019 14:04)  HR: 100 (01 Feb 2019 15:02) (100 - 134)  BP: 136/91 (01 Feb 2019 15:02) (136/91 - 147/71)  RR: 24 (01 Feb 2019 15:02) (22 - 24)  SpO2: 99% (01 Feb 2019 15:02) (99% - 100%)    PHYSICAL EXAM  Gen: WD, WN, in no acute distress.  Lungs: Respirations unlabored.   Abd: Soft, nontender, nondistended.   Ext: No clubbing, no cyanosis, no edema.  Neuro: A/Ox3. Cranial nerve intact. No focal deficit.

## 2019-02-01 NOTE — ED ADULT TRIAGE NOTE - CHIEF COMPLAINT QUOTE
Pt with known right sided pneumothorax. Was sent by Dr Lanza for xray to see if it has improved or worsened. No resp distress.

## 2019-02-01 NOTE — ED PROVIDER NOTE - ATTENDING CONTRIBUTION TO CARE
Dr. Caal: I have personally performed a face to face bedside history and physical examination of this patient. I have discussed the history, examination, review of systems, assessment and plan of management with the resident. I have reviewed the electronic medical record and amended it to reflect my history, review of systems, physical exam, assessment and plan.    58F with pmh of interstitial lung disease, NHL, TIA here, s/p spontaneous R pneumothorax (medically managed) 10 days ago returns with worsening right chest pain radiating to R neck. No f/c, leg swelling, f/c.    On exam well appearing  Mildly decreased breath sound at R upper lobe  no tachypnea  normal O2 sat  No LE edema  equal DP and radial pulses    will evaluate for worsening pneumothorax. do not think PE, PNA. possibly msk pain. plan for CXR, surgery c/s.

## 2019-02-01 NOTE — H&P ADULT - HISTORY OF PRESENT ILLNESS
58F with PMH of MVP, chronic subdural hematoma, interstitial lung disease, non-hodgkin's lymphoma and PSH of appendectomy who sent in by Dr. Lanza for spontaneous small R apical pneumothorax since on CXR done 1/29/19.  Patient reports some pleuritic chest pain on the right.  Denies dyspnea cough, hemoptysis, syncope or dizziness.

## 2019-02-01 NOTE — DISCHARGE NOTE ADULT - CARE PLAN
Principal Discharge DX:	Pneumothorax  Assessment and plan of treatment:	Follow up with Dr. Lanza in 7-10 days   Follow up with primary care provider in one week   Use oxygen as needed Principal Discharge DX:	Pneumothorax  Goal:	improvement  Assessment and plan of treatment:	As per Dr. Lanza, keep your apt for this Wednesday in the office. He will discuss with you any further plans. Come to ER immediately if any sudden chest pain or shortness of breath  Follow up with primary care provider in one week   Use oxygen as needed

## 2019-02-01 NOTE — DISCHARGE NOTE ADULT - PLAN OF CARE
Follow up with Dr. Lanza in 7-10 days   Follow up with primary care provider in one week   Use oxygen as needed improvement As per Dr. Lanza, keep your apt for this Wednesday in the office. He will discuss with you any further plans. Come to ER immediately if any sudden chest pain or shortness of breath  Follow up with primary care provider in one week   Use oxygen as needed

## 2019-02-01 NOTE — CONSULT NOTE ADULT - SUBJECTIVE AND OBJECTIVE BOX
58F with PMH of MVP, chronic subdural hematoma, interstitial lung disease, non-hodgkin lymphoma and PSH of appendectomy who sent in by Dr. Lanza for spontaneous small R apical pneumothorax since on CXR done 1/29/19.  Patient reports some pleuritic chest pain on the right.  Denies dypnea, cough, hemoptysis, syncope or dizziness.    ICU Vital Signs Last 24 Hrs  T(C): 36.7 (01 Feb 2019 15:02), Max: 36.7 (01 Feb 2019 14:04)  T(F): 98 (01 Feb 2019 15:02), Max: 98 (01 Feb 2019 14:04)  HR: 100 (01 Feb 2019 15:02) (100 - 134)  BP: 136/91 (01 Feb 2019 15:02) (136/91 - 147/71)  BP(mean): --  ABP: --  ABP(mean): --  RR: 24 (01 Feb 2019 15:02) (22 - 24)  SpO2: 99% (01 Feb 2019 15:02) (99% - 100%)    General: WN/WD NAD  Neurology: Awake, nonfocal, VAIL x 4  Eyes: Scleras clear, PERRLA/ EOMI, Gross vision intact  ENT:Gross hearing intact, grossly patent pharynx, no stridor  Neck: Neck supple, trachea midline, No JVD  Respiratory: respirations nonlabored, No wheezing,   CV: RRR, S1S2, sinus tachycardia  Abdominal: Soft, NT, ND +BS  Extremities: No edema, + peripheral pulses  Skin: No Rashes, Hematoma, Ecchymosis  Lymphatic: No Neck, axilla, groin LAD  Psych: Oriented x 3, normal affect, mildly anxious

## 2019-02-01 NOTE — DISCHARGE NOTE ADULT - NS AS ACTIVITY OBS
Walking-Indoors allowed/Stairs allowed/Walking-Outdoors allowed/Do not make important decisions/Do not drive or operate machinery/Showering allowed/No Heavy lifting/straining Stairs allowed/Return to Work/School allowed/Sex allowed/No Heavy lifting/straining/Walking-Indoors allowed/Walking-Outdoors allowed/Bathing allowed/Showering allowed

## 2019-02-01 NOTE — ED PROVIDER NOTE - PHYSICAL EXAMINATION
GEN: Well appearing, well nourished, in no apparent distress.  HEAD: NCAT  HEENT: PERRL, EOMI, no nystagmus, no facial droop, Airway patent, uvula midline, MMM, neck supple, no LAD, no JVD, no raccoon sign, no pool sign   LUNG: diffuse rhonchi, present breath sounds through out but slightly decreased Rgith upper, no adventitious sounds, no retractions, no nasal flaring, satting 100% RA  CV: RRR, no murmurs, chest wall not TTP   Abd: soft, NTND, no rebound or guarding, BS+ in all quadrants, no CVAT  : no inguinal bulging   MSK: WWP, Pulses 2+ in extremities, No edema, no visible deformities, strength 5/5 in all extremities, no midspine TTP  Neuro:  CN II-XII in tact. AAOx3, non Ambulatory with stable gait. sensations in tact in all extremities, neg pronator drift, neg finger to nose,   Skin: Warm and dry, no evidence of rash  Psych: normal mood and affect

## 2019-02-01 NOTE — ED PROVIDER NOTE - MEDICAL DECISION MAKING DETAILS
58y female pmh MVP, Interstitial lung dz, TIA, NHL s/p chemo and radiation, presents with pneumothorax diagnosed 10 days ago and 1 day of R neck pain radiating down R arm and chest.

## 2019-02-01 NOTE — CONSULT NOTE ADULT - ASSESSMENT
58F with multiple co-morbidities including interstitial lung disease, sent in by Dr. Lanza for small R apical spontaneous pneumothorax.

## 2019-02-01 NOTE — H&P ADULT - ASSESSMENT
58F with multiple co-morbidities including interstitial lung disease, sent in by Dr. Lanza for small R apical spontaneous pneumothorax.  CXR in ED demonstrates interval increase in size of right sided pneumothorax, patient stable at present time.     - Admit to cardiothoracic surgery, Dr. Srinivas Lanza, 8T  - Recommend IR pigtail placement ASAP  - Daily CXR  - NPO for present time   - Continue outpatient medications as appropriate  - PreIR labs to be drawn stat, AM labs to follow  - pain control  - vte ppx  - encourage ambulation / cough / deep breathing / incentive spirometry   - disposition: admit to 8T at present time    Patient seen and examined with JOSUE Woo MD, RAYSA Tanner. Plan discussed with SOLEDAD Lanza MD.     PASHA Eugene MD, PGY-I  Thoracic Surgery  Pager: [phone] 58F with multiple co-morbidities including interstitial lung disease, sent in by Dr. Lanza for small R apical spontaneous pneumothorax.  CXR in ED demonstrates interval increase in size of right sided pneumothorax, patient stable at present time.     - Admit to cardiothoracic surgery, Dr. Srinivas Lanza, 8T  - Plan for IR pigtail placement. Discussed between Dr. Lanza and Dr. Yanez.   - Daily CXR  - NPO for present time   - Continue outpatient medications as appropriate  - PreIR labs to be drawn stat, AM labs to follow  - pain control  - vte ppx  - encourage ambulation / cough / deep breathing / incentive spirometry   - disposition: admit to 8T at present time    Patient seen and examined with JOSUE Woo MD, RAYSA Tanner. Plan discussed with SOLEDAD Lanza MD.     PASHA Eugene MD, PGY-I  Thoracic Surgery  Pager: [phone]

## 2019-02-01 NOTE — H&P ADULT - PMH
Interstitial lung disease    Mitral prolapse    NHL (non-Hodgkin's lymphoma)  Agem 45 sp chemo/rt/stem cell  Pulmonary disease    Transient cerebral ischemia, unspecified type

## 2019-02-01 NOTE — ED ADULT NURSE NOTE - NSIMPLEMENTINTERV_GEN_ALL_ED
Implemented All Universal Safety Interventions:  [place] to call system. Call bell, personal items and telephone within reach. Instruct patient to call for assistance. Room bathroom lighting operational. Non-slip footwear when patient is off stretcher. Physically safe environment: no spills, clutter or unnecessary equipment. Stretcher in lowest position, wheels locked, appropriate side rails in place.

## 2019-02-01 NOTE — ED PROVIDER NOTE - OBJECTIVE STATEMENT
58y female pmh MVP, Interstitial lung dz, TIA, NHL s/p chemo and radiation, presents with pneumothorax diagnosed 58y female pmh MVP, Interstitial lung dz, TIA, NHL s/p chemo and radiation, presents with pneumothorax diagnosed 10 days ago and 1 day of R neck pain radiating down R arm and chest. Pt denies any worsening sob from baseline sob due to ILD, ha, blurry vision, tinnitus, f/c, uri symptoms, abdominal pain, back pain, n/v, diarrhea, n/v. Of note, pt has been having chronic peripheral neuropathy being worked up by neurologist - no hx dm

## 2019-02-01 NOTE — DISCHARGE NOTE ADULT - PATIENT PORTAL LINK FT
You can access the [facility], offered by [facility], by registering with the following website: [email or web address]

## 2019-02-01 NOTE — ED ADULT NURSE NOTE - ED STAT RN HANDOFF DETAILS
endorsed to rn renea. pt a&o x3, nad noted. tolerating room air. no c/o pain. safety maitnained. hob elevated. will monitor

## 2019-02-02 VITALS
SYSTOLIC BLOOD PRESSURE: 103 MMHG | HEART RATE: 76 BPM | RESPIRATION RATE: 18 BRPM | TEMPERATURE: 98 F | OXYGEN SATURATION: 96 % | DIASTOLIC BLOOD PRESSURE: 65 MMHG

## 2019-02-02 PROCEDURE — 71045 X-RAY EXAM CHEST 1 VIEW: CPT | Mod: 26

## 2019-02-02 PROCEDURE — 99238 HOSP IP/OBS DSCHRG MGMT 30/<: CPT

## 2019-02-02 RX ORDER — ACETAMINOPHEN 500 MG
2 TABLET ORAL
Qty: 0 | Refills: 0 | COMMUNITY
Start: 2019-02-02

## 2019-02-02 RX ORDER — SENNA PLUS 8.6 MG/1
2 TABLET ORAL
Qty: 0 | Refills: 0 | COMMUNITY
Start: 2019-02-02

## 2019-02-02 RX ORDER — ACETAMINOPHEN 500 MG
650 TABLET ORAL EVERY 6 HOURS
Qty: 0 | Refills: 0 | Status: DISCONTINUED | OUTPATIENT
Start: 2019-02-02 | End: 2019-02-02

## 2019-02-02 RX ORDER — DOCUSATE SODIUM 100 MG
1 CAPSULE ORAL
Qty: 0 | Refills: 0 | COMMUNITY
Start: 2019-02-02

## 2019-02-02 RX ADMIN — Medication 650 MILLIGRAM(S): at 01:24

## 2019-02-02 RX ADMIN — Medication 650 MILLIGRAM(S): at 02:21

## 2019-02-06 ENCOUNTER — FORM ENCOUNTER (OUTPATIENT)
Age: 59
End: 2019-02-06

## 2019-02-07 ENCOUNTER — OUTPATIENT (OUTPATIENT)
Dept: OUTPATIENT SERVICES | Facility: HOSPITAL | Age: 59
LOS: 1 days | End: 2019-02-07
Payer: MEDICARE

## 2019-02-07 ENCOUNTER — APPOINTMENT (OUTPATIENT)
Dept: THORACIC SURGERY | Facility: CLINIC | Age: 59
End: 2019-02-07
Payer: MEDICARE

## 2019-02-07 ENCOUNTER — APPOINTMENT (OUTPATIENT)
Dept: RADIOLOGY | Facility: HOSPITAL | Age: 59
End: 2019-02-07

## 2019-02-07 VITALS
BODY MASS INDEX: 18.37 KG/M2 | SYSTOLIC BLOOD PRESSURE: 118 MMHG | TEMPERATURE: 97.7 F | DIASTOLIC BLOOD PRESSURE: 78 MMHG | RESPIRATION RATE: 18 BRPM | OXYGEN SATURATION: 97 % | HEIGHT: 69 IN | WEIGHT: 124 LBS | HEART RATE: 110 BPM

## 2019-02-07 DIAGNOSIS — Z80.3 FAMILY HISTORY OF MALIGNANT NEOPLASM OF BREAST: ICD-10-CM

## 2019-02-07 DIAGNOSIS — Z90.49 ACQUIRED ABSENCE OF OTHER SPECIFIED PARTS OF DIGESTIVE TRACT: Chronic | ICD-10-CM

## 2019-02-07 DIAGNOSIS — Z87.891 PERSONAL HISTORY OF NICOTINE DEPENDENCE: ICD-10-CM

## 2019-02-07 DIAGNOSIS — Z87.09 PERSONAL HISTORY OF OTHER DISEASES OF THE RESPIRATORY SYSTEM: ICD-10-CM

## 2019-02-07 DIAGNOSIS — Z78.9 OTHER SPECIFIED HEALTH STATUS: ICD-10-CM

## 2019-02-07 DIAGNOSIS — Z85.72 PERSONAL HISTORY OF NON-HODGKIN LYMPHOMAS: ICD-10-CM

## 2019-02-07 DIAGNOSIS — J93.9 PNEUMOTHORAX, UNSPECIFIED: ICD-10-CM

## 2019-02-07 PROCEDURE — 99205 OFFICE O/P NEW HI 60 MIN: CPT

## 2019-02-07 PROCEDURE — 71046 X-RAY EXAM CHEST 2 VIEWS: CPT | Mod: 26

## 2019-02-07 RX ORDER — ACETAMINOPHEN 325 MG/1
325 TABLET ORAL EVERY 6 HOURS
Refills: 0 | Status: ACTIVE | COMMUNITY

## 2019-02-07 NOTE — HISTORY OF PRESENT ILLNESS
[FreeTextEntry1] : 59 y/o female, former smoker for 15 yrs( 1PPD, quit 20 yrs ago), with PMhx of interstitial lung disease, chronic subdural hematoma, non-Hodgkin's lymphoma, who presented to [facility] ED for chest pain on 2/1/19. Prior to that, outpatient CXR from 1/18/19 revealed a right apical pneumothorax. She was planned for right chest tube insertion, however, Dr. Lanza have discussed with Dr. Yanez, who believed the Rt PTX is too small to have drainage. Pt was D/C on 2/2/19 for continued observation. \par \par She presents today for evaluation of Rt PTx. She reports SOB on exertion and walking, on O2 2L/NL, intermittent cough and mild right sided chest pain.

## 2019-02-07 NOTE — ASSESSMENT
[FreeTextEntry1] : 59 y/o female, former smoker for 15 yrs (1PPD, quit 20 yrs ago), with PMhx of interstitial lung disease, chronic subdural hematoma, non-hodgkin's lymphoma, who presented to [facility] ED for chest pain on 2/1/19. Prior to that, outpatient CXR from 1/18/19 revealed a right apical pneumothorax. She was planned for right chest tube insertion, however, Dr. Lanza have discussed with Dr. Yanez, who believed the Rt PTX is too small to have drainage. Pt was D/C on 2/2/19 for continued observation. \par \par CXR today 2/7/19 revealed stable Right apical pneumothorax. \par \par I have reviewed the patient's medical records and diagnostic images at the time of this office consultation and have made the following recommendation.\par Plan:\par 1. I have length discussion with pt regarding her interstitial lung disease, I recommended her for lung wedge resection with biopsy to determine the diagnosis, she stated needs to think about it and will discuss with her pulmonologist Dr. Hazel again. \par 2. RTC in 2 weeks with CXR. \par \par \par \par \par Written by Rachel Cerda NP, acting as a scribe for Dr. Srinivas Lanza.      \par “The documentation recorded by the scribe accurately reflects the service I personally performed and the decisions made by me.” Srinivas Lanza MD.\par \par \par

## 2019-02-07 NOTE — DATA REVIEWED
[FreeTextEntry1] : EXAM:  XR CHEST PA LAT 2V  \par \par \par PROCEDURE DATE:  Feb 7 2019 \par \par \par \par INTERPRETATION:  EXAMINATION: XR CHEST PA LAT 2V\par \par CLINICAL INDICATION:  Right pneumothorax. \par \par TECHNIQUE: PA and lateral radiographs of the chest were obtained on \par 2/7/2019 \par \par COMPARISON: Chest radiograph 2/2/2019 at 8:06 AM.\par \par FINDINGS: \par \par The cardiomediastinal silhouette is not well evaluated in this \par projection. There is no pleural effusion. Small right apical pneumothorax \par is not significantly changed. Faint peripheral opacities overlying the \par right upper and mid chest may be related to overlying soft tissues, \par although, new focal opacities such as secondary to loculated pleural \par effusion are not completely excluded..\par Bilateral reticular opacities and poorly defined left lung nodular \par opacities are not significantly changed.\par There is scoliosis of the spine with osteoarthritic degenerative change.\par \par IMPRESSION: \par Small right apical pneumothorax, not significantly changed.\par \par Faint peripheral opacities overlying the right upper and mid chest may be \par related to overlying soft tissues of the new opacities such as secondary \par to loculated pleural effusion are not completely excluded.\par \par Bilateral reticular opacities and poorly defined left lung nodular \par opacities are not significantly changed.\par \par \par \par \par \par \par ANAND LEE M.D., RADIOLOGY RESIDENT\par This document has been electronically signed.\par SHLOMO THOMAS M.D., ATTENDING RADIOLOGIST\par This document has been electronically signed. Feb 7 2019  3:14PM\par   \par \par   \par \par

## 2019-02-07 NOTE — PHYSICAL EXAM
[General Appearance - Alert] : alert [General Appearance - In No Acute Distress] : in no acute distress [Sclera] : the sclera and conjunctiva were normal [PERRL With Normal Accommodation] : pupils were equal in size, round, and reactive to light [Outer Ear] : the ears and nose were normal in appearance [Hearing Threshold Finger Rub Not Pinal] : hearing was normal [Neck Appearance] : the appearance of the neck was normal [Neck Cervical Mass (___cm)] : no neck mass was observed [] : no respiratory distress [Respiration, Rhythm And Depth] : normal respiratory rhythm and effort [Auscultation Breath Sounds / Voice Sounds] : lungs were clear to auscultation bilaterally [Heart Rate And Rhythm] : heart rate was normal and rhythm regular [Heart Sounds] : normal S1 and S2 [Examination Of The Chest] : the chest was normal in appearance [2+] : left 2+ [No Abnormalities] : the abdominal aorta was not enlarged and no bruit was heard [No Pulse Delay] : no pulse delay [No Pulse Discrepancy] : no pulse discrepancy detected [Full Pulse] : peripheral pulses were full [Bowel Sounds] : normal bowel sounds [Abdomen Soft] : soft [Abdomen Tenderness] : non-tender [Cervical Lymph Nodes Enlarged Posterior Bilaterally] : posterior cervical [Cervical Lymph Nodes Enlarged Anterior Bilaterally] : anterior cervical [No CVA Tenderness] : no ~M costovertebral angle tenderness [Abnormal Walk] : normal gait [Involuntary Movements] : no involuntary movements were seen [Skin Color & Pigmentation] : normal skin color and pigmentation [Skin Turgor] : normal skin turgor [Oriented To Time, Place, And Person] : oriented to person, place, and time [Affect] : the affect was normal [Mood] : the mood was normal [Fingers] :  capillary refill of the fingers was normal [Varicose Veins Of The Right Leg] : the patient has no varicose veins of the right leg [Varicose Veins Of The Left Leg] : the patient has no varicose veins of the left leg [FreeTextEntry1] : deferred

## 2019-02-20 ENCOUNTER — APPOINTMENT (OUTPATIENT)
Dept: THORACIC SURGERY | Facility: CLINIC | Age: 59
End: 2019-02-20

## 2019-03-12 ENCOUNTER — INPATIENT (INPATIENT)
Facility: HOSPITAL | Age: 59
LOS: 1 days | Discharge: HOME | DRG: 551 | End: 2019-03-14
Attending: INTERNAL MEDICINE | Admitting: HOSPITALIST
Payer: MEDICARE

## 2019-03-12 VITALS
DIASTOLIC BLOOD PRESSURE: 87 MMHG | TEMPERATURE: 98 F | OXYGEN SATURATION: 99 % | HEART RATE: 81 BPM | WEIGHT: 126.99 LBS | RESPIRATION RATE: 18 BRPM | SYSTOLIC BLOOD PRESSURE: 144 MMHG

## 2019-03-12 DIAGNOSIS — Z90.49 ACQUIRED ABSENCE OF OTHER SPECIFIED PARTS OF DIGESTIVE TRACT: Chronic | ICD-10-CM

## 2019-03-12 LAB
ALBUMIN SERPL ELPH-MCNC: 3.4 G/DL — SIGNIFICANT CHANGE UP (ref 3.3–5)
ALP SERPL-CCNC: 110 U/L — SIGNIFICANT CHANGE UP (ref 40–120)
ALT FLD-CCNC: 15 U/L — SIGNIFICANT CHANGE UP (ref 12–78)
ANION GAP SERPL CALC-SCNC: 7 MMOL/L — SIGNIFICANT CHANGE UP (ref 5–17)
APTT BLD: 34.3 SEC — SIGNIFICANT CHANGE UP (ref 28.5–37)
AST SERPL-CCNC: 16 U/L — SIGNIFICANT CHANGE UP (ref 15–37)
BASOPHILS # BLD AUTO: 0.04 K/UL — SIGNIFICANT CHANGE UP (ref 0–0.2)
BASOPHILS NFR BLD AUTO: 0.7 % — SIGNIFICANT CHANGE UP (ref 0–2)
BILIRUB SERPL-MCNC: 0.3 MG/DL — SIGNIFICANT CHANGE UP (ref 0.2–1.2)
BUN SERPL-MCNC: 21 MG/DL — SIGNIFICANT CHANGE UP (ref 7–23)
CALCIUM SERPL-MCNC: 9 MG/DL — SIGNIFICANT CHANGE UP (ref 8.5–10.1)
CHLORIDE SERPL-SCNC: 103 MMOL/L — SIGNIFICANT CHANGE UP (ref 96–108)
CO2 SERPL-SCNC: 30 MMOL/L — SIGNIFICANT CHANGE UP (ref 22–31)
CREAT SERPL-MCNC: 0.88 MG/DL — SIGNIFICANT CHANGE UP (ref 0.5–1.3)
EOSINOPHIL # BLD AUTO: 0.27 K/UL — SIGNIFICANT CHANGE UP (ref 0–0.5)
EOSINOPHIL NFR BLD AUTO: 4.5 % — SIGNIFICANT CHANGE UP (ref 0–6)
GLUCOSE SERPL-MCNC: 114 MG/DL — HIGH (ref 70–99)
HCT VFR BLD CALC: 40.8 % — SIGNIFICANT CHANGE UP (ref 34.5–45)
HGB BLD-MCNC: 13.4 G/DL — SIGNIFICANT CHANGE UP (ref 11.5–15.5)
IMM GRANULOCYTES NFR BLD AUTO: 0.3 % — SIGNIFICANT CHANGE UP (ref 0–1.5)
INR BLD: 0.95 RATIO — SIGNIFICANT CHANGE UP (ref 0.88–1.16)
LYMPHOCYTES # BLD AUTO: 1.37 K/UL — SIGNIFICANT CHANGE UP (ref 1–3.3)
LYMPHOCYTES # BLD AUTO: 22.7 % — SIGNIFICANT CHANGE UP (ref 13–44)
MCHC RBC-ENTMCNC: 30.9 PG — SIGNIFICANT CHANGE UP (ref 27–34)
MCHC RBC-ENTMCNC: 32.8 GM/DL — SIGNIFICANT CHANGE UP (ref 32–36)
MCV RBC AUTO: 94 FL — SIGNIFICANT CHANGE UP (ref 80–100)
MONOCYTES # BLD AUTO: 0.57 K/UL — SIGNIFICANT CHANGE UP (ref 0–0.9)
MONOCYTES NFR BLD AUTO: 9.5 % — SIGNIFICANT CHANGE UP (ref 2–14)
NEUTROPHILS # BLD AUTO: 3.76 K/UL — SIGNIFICANT CHANGE UP (ref 1.8–7.4)
NEUTROPHILS NFR BLD AUTO: 62.3 % — SIGNIFICANT CHANGE UP (ref 43–77)
NRBC # BLD: 0 /100 WBCS — SIGNIFICANT CHANGE UP (ref 0–0)
PLATELET # BLD AUTO: 313 K/UL — SIGNIFICANT CHANGE UP (ref 150–400)
POTASSIUM SERPL-MCNC: 4.3 MMOL/L — SIGNIFICANT CHANGE UP (ref 3.5–5.3)
POTASSIUM SERPL-SCNC: 4.3 MMOL/L — SIGNIFICANT CHANGE UP (ref 3.5–5.3)
PROT SERPL-MCNC: 7.5 G/DL — SIGNIFICANT CHANGE UP (ref 6–8.3)
PROTHROM AB SERPL-ACNC: 10.8 SEC — SIGNIFICANT CHANGE UP (ref 10–12.9)
RBC # BLD: 4.34 M/UL — SIGNIFICANT CHANGE UP (ref 3.8–5.2)
RBC # FLD: 13.8 % — SIGNIFICANT CHANGE UP (ref 10.3–14.5)
SODIUM SERPL-SCNC: 140 MMOL/L — SIGNIFICANT CHANGE UP (ref 135–145)
WBC # BLD: 6.03 K/UL — SIGNIFICANT CHANGE UP (ref 3.8–10.5)
WBC # FLD AUTO: 6.03 K/UL — SIGNIFICANT CHANGE UP (ref 3.8–10.5)

## 2019-03-12 PROCEDURE — 70450 CT HEAD/BRAIN W/O DYE: CPT | Mod: 26

## 2019-03-12 PROCEDURE — 71045 X-RAY EXAM CHEST 1 VIEW: CPT | Mod: 26

## 2019-03-12 PROCEDURE — 93010 ELECTROCARDIOGRAM REPORT: CPT

## 2019-03-12 PROCEDURE — 72125 CT NECK SPINE W/O DYE: CPT | Mod: 26

## 2019-03-12 RX ORDER — ACETAMINOPHEN 500 MG
650 TABLET ORAL ONCE
Qty: 0 | Refills: 0 | Status: COMPLETED | OUTPATIENT
Start: 2019-03-12 | End: 2019-03-12

## 2019-03-12 RX ORDER — OXYCODONE AND ACETAMINOPHEN 5; 325 MG/1; MG/1
1 TABLET ORAL ONCE
Qty: 0 | Refills: 0 | Status: DISCONTINUED | OUTPATIENT
Start: 2019-03-12 | End: 2019-03-12

## 2019-03-12 RX ORDER — SODIUM CHLORIDE 9 MG/ML
3 INJECTION INTRAMUSCULAR; INTRAVENOUS; SUBCUTANEOUS ONCE
Qty: 0 | Refills: 0 | Status: COMPLETED | OUTPATIENT
Start: 2019-03-12 | End: 2019-03-12

## 2019-03-12 RX ADMIN — SODIUM CHLORIDE 3 MILLILITER(S): 9 INJECTION INTRAMUSCULAR; INTRAVENOUS; SUBCUTANEOUS at 21:32

## 2019-03-12 RX ADMIN — Medication 650 MILLIGRAM(S): at 23:21

## 2019-03-12 RX ADMIN — Medication 650 MILLIGRAM(S): at 22:51

## 2019-03-12 NOTE — ED ADULT TRIAGE NOTE - CHIEF COMPLAINT QUOTE
Per daughter pt was on the phone with sister and the phone dropped and she lost feeling in her arms and hands.  Also reports blurred vision.

## 2019-03-12 NOTE — ED ADULT NURSE NOTE - NSIMPLEMENTINTERV_GEN_ALL_ED
Implemented All Fall with Harm Risk Interventions:  [place] to call system. Call bell, personal items and telephone within reach. Instruct patient to call for assistance. Room bathroom lighting operational. Non-slip footwear when patient is off stretcher. Physically safe environment: no spills, clutter or unnecessary equipment. Stretcher in lowest position, wheels locked, appropriate side rails in place. Provide visual cue, wrist band, yellow gown, etc. Monitor gait and stability. Monitor for mental status changes and reorient to person, place, and time. Review medications for side effects contributing to fall risk. Reinforce activity limits and safety measures with patient and family. Provide visual clues: red socks.

## 2019-03-12 NOTE — ED PROVIDER NOTE - CARE PLAN
Principal Discharge DX:	Arm weakness  Secondary Diagnosis:	Pneumothorax on right  Secondary Diagnosis:	Chronic subdural hematoma

## 2019-03-12 NOTE — ED PROVIDER NOTE - ENMT, MLM
Airway patent, Nasal mucosa clear. Mouth with normal mucosa. Throat has no vesicles, no oropharyngeal exudates and uvula is midline. NO ext signs of trauma.

## 2019-03-12 NOTE — ED ADULT NURSE NOTE - OBJECTIVE STATEMENT
Patient brought in by EMS reports bilateral arm weakness tonight started 1 hour ago. Patient denies nausea/vomiting, no fever/ chills. Patient brought in by EMS reports bilateral arm weakness tonight started 1 hour ago. Patient denies nausea/vomiting, no fever/ chills. On assessment noted with blanchable redness on the sacral area.

## 2019-03-12 NOTE — ED PROVIDER NOTE - CHPI ED SYMPTOMS NEG
no cough/no headache/no chills/no decreased eating/drinking/no abdominal pain/no rash/no diarrhea/no fever/no shortness of breath/no vomiting

## 2019-03-12 NOTE — ED ADULT TRIAGE NOTE - CHIEF COMPLAINT QUOTE
Per daughter patient was on the phone with her sister when she became weak and had arm numbness, pt is being treated for pneumothorax.

## 2019-03-12 NOTE — ED PROVIDER NOTE - OBJECTIVE STATEMENT
57 yo F p/w sudden onset tonight weakness in bl arms, no numbness. Onset ~ 45 min pta, now much improved. Pt with ?? mild residual sx. NO fall / recent trauma. Pt with mild ha today. Pt has been having chronic headaches, other neuro changes which she is being followed by neurology. Pt also with recent R sided PTX, which has recurred , and is being followed by outpt pulm with freq xr, recent CT. No cp, no sob. NO agg/allev factors. NO other inj or co.

## 2019-03-12 NOTE — ED PROVIDER NOTE - PROGRESS NOTE DETAILS
Dw Dr Reyes (E Last) , will see pt to admit . Pt doing well. dw pt and family re all findings, including CT findings.  Dw Radiology, CT findings in neck likely old but will need non-emergent MRI tomorrow.

## 2019-03-12 NOTE — ED ADULT NURSE NOTE - CHPI ED NUR SYMPTOMS NEG
no change in level of consciousness/no vomiting/no fever/no loss of consciousness/no nausea/no confusion

## 2019-03-12 NOTE — ED PROVIDER NOTE - NEUROLOGICAL, MLM
Alert and oriented, no focal deficits, no motor or sensory deficits. Pt intially actively pulling arms down against resistance, then after counceling pt - able to hodl bl arms up, 5/5 str with no focal neuro findings

## 2019-03-12 NOTE — ED ADULT NURSE REASSESSMENT NOTE - NS ED NURSE REASSESS COMMENT FT1
Patient c/o headache, Dr. Gold made aware and ordered Percocet but patient refused percocet instead asked for Tylenol, Dr. Gold made aware. Patient c/o headache @ 8/10 numeric, Dr. Gold made aware and ordered Percocet but patient refused percocet instead asked for Tylenol, Dr. Gold made aware.

## 2019-03-13 ENCOUNTER — TRANSCRIPTION ENCOUNTER (OUTPATIENT)
Age: 59
End: 2019-03-13

## 2019-03-13 DIAGNOSIS — H81.09 MENIERE'S DISEASE, UNSPECIFIED EAR: ICD-10-CM

## 2019-03-13 DIAGNOSIS — R29.898 OTHER SYMPTOMS AND SIGNS INVOLVING THE MUSCULOSKELETAL SYSTEM: ICD-10-CM

## 2019-03-13 DIAGNOSIS — Z86.79 PERSONAL HISTORY OF OTHER DISEASES OF THE CIRCULATORY SYSTEM: ICD-10-CM

## 2019-03-13 DIAGNOSIS — R00.0 TACHYCARDIA, UNSPECIFIED: ICD-10-CM

## 2019-03-13 DIAGNOSIS — Z90.89 ACQUIRED ABSENCE OF OTHER ORGANS: Chronic | ICD-10-CM

## 2019-03-13 DIAGNOSIS — Z29.9 ENCOUNTER FOR PROPHYLACTIC MEASURES, UNSPECIFIED: ICD-10-CM

## 2019-03-13 DIAGNOSIS — M62.81 MUSCLE WEAKNESS (GENERALIZED): ICD-10-CM

## 2019-03-13 DIAGNOSIS — G62.9 POLYNEUROPATHY, UNSPECIFIED: ICD-10-CM

## 2019-03-13 DIAGNOSIS — J93.9 PNEUMOTHORAX, UNSPECIFIED: ICD-10-CM

## 2019-03-13 DIAGNOSIS — R51 HEADACHE: ICD-10-CM

## 2019-03-13 PROBLEM — J98.4 OTHER DISORDERS OF LUNG: Chronic | Status: INACTIVE | Noted: 2018-08-20 | Resolved: 2019-03-13

## 2019-03-13 PROBLEM — J84.9 INTERSTITIAL PULMONARY DISEASE, UNSPECIFIED: Chronic | Status: ACTIVE | Noted: 2019-02-01

## 2019-03-13 LAB
ANION GAP SERPL CALC-SCNC: 5 MMOL/L — SIGNIFICANT CHANGE UP (ref 5–17)
BUN SERPL-MCNC: 17 MG/DL — SIGNIFICANT CHANGE UP (ref 7–23)
CALCIUM SERPL-MCNC: 9.2 MG/DL — SIGNIFICANT CHANGE UP (ref 8.5–10.1)
CHLORIDE SERPL-SCNC: 105 MMOL/L — SIGNIFICANT CHANGE UP (ref 96–108)
CHOLEST SERPL-MCNC: 193 MG/DL — SIGNIFICANT CHANGE UP (ref 10–199)
CO2 SERPL-SCNC: 32 MMOL/L — HIGH (ref 22–31)
CREAT SERPL-MCNC: 0.82 MG/DL — SIGNIFICANT CHANGE UP (ref 0.5–1.3)
GLUCOSE SERPL-MCNC: 91 MG/DL — SIGNIFICANT CHANGE UP (ref 70–99)
HCT VFR BLD CALC: 40.6 % — SIGNIFICANT CHANGE UP (ref 34.5–45)
HCV AB S/CO SERPL IA: 0.12 S/CO — SIGNIFICANT CHANGE UP (ref 0–0.79)
HCV AB SERPL-IMP: SIGNIFICANT CHANGE UP
HDLC SERPL-MCNC: 62 MG/DL — SIGNIFICANT CHANGE UP
HGB BLD-MCNC: 13.1 G/DL — SIGNIFICANT CHANGE UP (ref 11.5–15.5)
LIPID PNL WITH DIRECT LDL SERPL: 109 MG/DL — HIGH
MAGNESIUM SERPL-MCNC: 2.3 MG/DL — SIGNIFICANT CHANGE UP (ref 1.6–2.6)
MCHC RBC-ENTMCNC: 30.6 PG — SIGNIFICANT CHANGE UP (ref 27–34)
MCHC RBC-ENTMCNC: 32.3 GM/DL — SIGNIFICANT CHANGE UP (ref 32–36)
MCV RBC AUTO: 94.9 FL — SIGNIFICANT CHANGE UP (ref 80–100)
NRBC # BLD: 0 /100 WBCS — SIGNIFICANT CHANGE UP (ref 0–0)
PHOSPHATE SERPL-MCNC: 3.5 MG/DL — SIGNIFICANT CHANGE UP (ref 2.5–4.5)
PLATELET # BLD AUTO: 294 K/UL — SIGNIFICANT CHANGE UP (ref 150–400)
POTASSIUM SERPL-MCNC: 4.4 MMOL/L — SIGNIFICANT CHANGE UP (ref 3.5–5.3)
POTASSIUM SERPL-SCNC: 4.4 MMOL/L — SIGNIFICANT CHANGE UP (ref 3.5–5.3)
RBC # BLD: 4.28 M/UL — SIGNIFICANT CHANGE UP (ref 3.8–5.2)
RBC # FLD: 13.8 % — SIGNIFICANT CHANGE UP (ref 10.3–14.5)
SODIUM SERPL-SCNC: 142 MMOL/L — SIGNIFICANT CHANGE UP (ref 135–145)
TOTAL CHOLESTEROL/HDL RATIO MEASUREMENT: 3.1 RATIO — LOW (ref 3.3–7.1)
TRIGL SERPL-MCNC: 112 MG/DL — SIGNIFICANT CHANGE UP (ref 10–149)
WBC # BLD: 6.43 K/UL — SIGNIFICANT CHANGE UP (ref 3.8–10.5)
WBC # FLD AUTO: 6.43 K/UL — SIGNIFICANT CHANGE UP (ref 3.8–10.5)

## 2019-03-13 PROCEDURE — 99223 1ST HOSP IP/OBS HIGH 75: CPT | Mod: GC,AI

## 2019-03-13 PROCEDURE — 12345: CPT | Mod: NC,GC

## 2019-03-13 PROCEDURE — 99285 EMERGENCY DEPT VISIT HI MDM: CPT

## 2019-03-13 PROCEDURE — 93880 EXTRACRANIAL BILAT STUDY: CPT | Mod: 26

## 2019-03-13 PROCEDURE — 70551 MRI BRAIN STEM W/O DYE: CPT | Mod: 26

## 2019-03-13 RX ORDER — ACETAMINOPHEN 500 MG
650 TABLET ORAL EVERY 6 HOURS
Qty: 0 | Refills: 0 | Status: DISCONTINUED | OUTPATIENT
Start: 2019-03-13 | End: 2019-03-14

## 2019-03-13 RX ORDER — ONDANSETRON 8 MG/1
4 TABLET, FILM COATED ORAL EVERY 6 HOURS
Qty: 0 | Refills: 0 | Status: DISCONTINUED | OUTPATIENT
Start: 2019-03-13 | End: 2019-03-14

## 2019-03-13 RX ORDER — TIOTROPIUM BROMIDE 18 UG/1
0 CAPSULE ORAL; RESPIRATORY (INHALATION)
Qty: 4 | Refills: 0 | COMMUNITY

## 2019-03-13 RX ORDER — CLOPIDOGREL BISULFATE 75 MG/1
75 TABLET, FILM COATED ORAL DAILY
Qty: 0 | Refills: 0 | Status: DISCONTINUED | OUTPATIENT
Start: 2019-03-13 | End: 2019-03-13

## 2019-03-13 RX ORDER — DILTIAZEM HCL 120 MG
120 CAPSULE, EXT RELEASE 24 HR ORAL DAILY
Qty: 0 | Refills: 0 | Status: DISCONTINUED | OUTPATIENT
Start: 2019-03-13 | End: 2019-03-14

## 2019-03-13 RX ORDER — GABAPENTIN 400 MG/1
0 CAPSULE ORAL
Qty: 60 | Refills: 0 | COMMUNITY

## 2019-03-13 RX ORDER — LEVALBUTEROL 1.25 MG/.5ML
0 SOLUTION, CONCENTRATE RESPIRATORY (INHALATION)
Qty: 15 | Refills: 0 | COMMUNITY

## 2019-03-13 RX ORDER — ATORVASTATIN CALCIUM 80 MG/1
40 TABLET, FILM COATED ORAL AT BEDTIME
Qty: 0 | Refills: 0 | Status: DISCONTINUED | OUTPATIENT
Start: 2019-03-13 | End: 2019-03-13

## 2019-03-13 RX ORDER — DILTIAZEM HCL 120 MG
0 CAPSULE, EXT RELEASE 24 HR ORAL
Qty: 30 | Refills: 0 | COMMUNITY

## 2019-03-13 RX ADMIN — Medication 120 MILLIGRAM(S): at 10:47

## 2019-03-13 RX ADMIN — Medication 650 MILLIGRAM(S): at 10:47

## 2019-03-13 RX ADMIN — Medication 650 MILLIGRAM(S): at 11:15

## 2019-03-13 RX ADMIN — Medication 650 MILLIGRAM(S): at 17:50

## 2019-03-13 RX ADMIN — Medication 650 MILLIGRAM(S): at 19:30

## 2019-03-13 NOTE — H&P ADULT - NSHPOUTPATIENTPROVIDERS_GEN_ALL_CORE
PMD: Dr. Lemon   Neuro: Dr. Wallace ([phone])  Pulm: Dr. Hazel ()  Cardio: Dr. Plascencia  Thoracic surgery: Dr. Lanza

## 2019-03-13 NOTE — H&P ADULT - NSHPREVIEWOFSYSTEMS_GEN_ALL_CORE
CONSTITUTIONAL: denies fever, chills, fatigue, weakness  HEENT: denies blurred vision, admits to photosensativity, phonosensativity  SKIN: denies new lesions, rash  CARDIOVASCULAR: denies chest pain, chest pressure, palpitations  RESPIRATORY: denies shortness of breath  GASTROINTESTINAL: denies nausea, vomiting, diarrhea, abdominal pain  NEUROLOGICAL: admits to headache, burning nerve pain in b/l hands on and off, difficulty concentrating   MUSCULOSKELETAL: denies new joint pain, muscle aches  HEMATOLOGIC: denies gross bleeding, bruising  LYMPHATICS: denies enlarged lymph nodes, extremity swelling  PSYCHIATRIC: denies recent changes in anxiety, depression

## 2019-03-13 NOTE — DISCHARGE NOTE PROVIDER - CARE PROVIDERS DIRECT ADDRESSES
,DirectAddress_Unknown,DirectAddress_Unknown,[email or web address],DirectAddress_Unknown

## 2019-03-13 NOTE — DISCHARGE NOTE PROVIDER - NSDCCPCAREPLAN_GEN_ALL_CORE_FT
PRINCIPAL DISCHARGE DIAGNOSIS  Problem: Arm weakness  Assessment and Plan of Treatment: You were worked up for a TIA. your weakness was found to be due to ****      SECONDARY DISCHARGE DIAGNOSES  Problem: Pneumothorax on right  Assessment and Plan of Treatment: Please follow up with your pulmonologist upon dc for further management    Problem: Chronic subdural hematoma  Assessment and Plan of Treatment: scans shows this was resolving.    Problem: Tachycardia, unspecified  Assessment and Plan of Treatment: please continue to take your cardizem as prescribed    Problem: Chronic headaches  Assessment and Plan of Treatment: Please follow up with your neurologist as outpatient for further management    Problem: Meniere disease  Assessment and Plan of Treatment: You are not on any medications. Please follow up with your PCP for further management    Problem: Peripheral neuropathy  Assessment and Plan of Treatment: You are not on any medications. Please follow up with your PCP for further management    Problem: Lung interstitial disease  Assessment and Plan of Treatment: You are not on any medications. Please follow up with your pulmonlogist as outpatient for further management PRINCIPAL DISCHARGE DIAGNOSIS  Diagnosis: Arm weakness  Assessment and Plan of Treatment: You were worked up for a TIA. Workup was negative. your weakness maybe from your cervical stenosis. Neurology evaluated you. Please follow up with Neurology as outpatient for further workup and management.      SECONDARY DISCHARGE DIAGNOSES  Diagnosis: Lung interstitial disease  Assessment and Plan of Treatment: You are not on any medications. Please follow up with your pulmonlogist as outpatient for further management    Diagnosis: Chronic subdural hematoma  Assessment and Plan of Treatment: scans shows this was resolving.    Diagnosis: Tachycardia, unspecified  Assessment and Plan of Treatment: please continue to take your cardizem as prescribed    Diagnosis: Chronic headaches  Assessment and Plan of Treatment: Please follow up with your neurologist as outpatient for further management    Diagnosis: Meniere disease  Assessment and Plan of Treatment: You are not on any medications. Please follow up with your PCP for further management    Diagnosis: Peripheral neuropathy  Assessment and Plan of Treatment: You are not on any medications. Please follow up with your PCP for further management    Diagnosis: Pneumothorax on right  Assessment and Plan of Treatment: Please follow up with your pulmonologist upon dc for further management PRINCIPAL DISCHARGE DIAGNOSIS  Diagnosis: Arm weakness  Assessment and Plan of Treatment: You were worked up for a TIA. Workup was negative. your weakness maybe from your cervical stenosis. You did not want any additional imaging of your neck. Neurology evaluated you. Please follow up with Neurology as outpatient for further workup and management.      SECONDARY DISCHARGE DIAGNOSES  Diagnosis: Lung interstitial disease  Assessment and Plan of Treatment: You are not on any medications. Please follow up with your pulmonlogist as outpatient for further management    Diagnosis: Chronic subdural hematoma  Assessment and Plan of Treatment: scans shows this was resolving.    Diagnosis: Tachycardia, unspecified  Assessment and Plan of Treatment: please continue to take your cardizem as prescribed    Diagnosis: Chronic headaches  Assessment and Plan of Treatment: Please follow up with your neurologist as outpatient for further management    Diagnosis: Meniere disease  Assessment and Plan of Treatment: You are not on any medications. Please follow up with your PCP for further management    Diagnosis: Peripheral neuropathy  Assessment and Plan of Treatment: You are not on any medications. Please follow up with your PCP for further management    Diagnosis: Pneumothorax on right  Assessment and Plan of Treatment: Please follow up with your pulmonologist upon dc for further management

## 2019-03-13 NOTE — DISCHARGE NOTE PROVIDER - PROVIDER TOKENS
PROVIDER:[TOKEN:[[id number]],FOLLOWUP:[1 week]],FREE:[LAST:[Shad],FIRST:[Krystian],PHONE:[phone]:[(   [phone]    -],ADDRESS:[[address] [place] [address], [place] [address]]],PROVIDER:[TOKEN:[[id number]]],FREE:[LAST:[Shauna],FIRST:[Bettye],PHONE:[phone]],FAX:[(   )    -]]

## 2019-03-13 NOTE — PROGRESS NOTE ADULT - NSICDXPROBLEM_GEN_ALL_CORE_FT
PROBLEM DIAGNOSES  Problem: Need for prophylactic measure  Assessment and Plan:     Problem: Tachycardia, unspecified  Assessment and Plan:     Problem: Chronic headaches  Assessment and Plan:     Problem: Peripheral neuropathy  Assessment and Plan:     Problem: History of subdural hematoma  Assessment and Plan:     Problem: Meniere disease  Assessment and Plan:     Problem: Pneumothorax, right  Assessment and Plan:     Problem: Muscle weakness of upper extremity  Assessment and Plan:

## 2019-03-13 NOTE — PROGRESS NOTE ADULT - SUBJECTIVE AND OBJECTIVE BOX
Patient is a 59y old  Female who presents with a chief complaint of Arm weakness (13 Mar 2019 00:45)      FROM ADMISSION H+P:   HPI:  59F with PMHx of MVP, chronic subdural hematoma, interstitial lung disease( on home o2 PRN), non-hodgkin's lymphoma, tachycardia( on diltiazem), meniere's disease, neuropathy presents after sudden onset of b/l UE weakness. Patient was on the phone with her daughter and had sudden onset of b/l upper extremity weakness which caused her to drop the phone. Had just finished PT exercises prior to episode.  Patient states that she could not  anything or move her arms.  who witnessed event states that patient had a look of panic on her face, whole body was limp, no slurring of words noted. Patient states that numbness lasted approximately 15 minutes. Per patient this episode was accompanied by dizziness and blurred vision. Patient admits to headache, photosensitivity photosensitivity, nausea, difficulty concentrating. Patient noted to have recent chronic headaches which she is following with neurology for as outpatient but states that her current headache is worse than normal.  Patient denies new CP, SOB, abdominal pain, diarrhea. Of note patient states that a few months ago she had a right sided catheterization to evaluate for pulmonary HTN and has had arm numbness and pain on and off since.  Pt also with recent R sided PTX, which has recurred , and is being followed by outpt pulm with freq xr, recent CT. No trauma, no LOC, no falls.    In the ED VS: WNL. Labs WNL. CXR: no acute changes. CT head: old b/l frontal subdural hematomas slightly decreased in size, mild chronic ischemic changes in frontoparietal white matter. CT neck: Abnormal density in the ventral epidural space extending from C2 through C3-C4 measuring up to 5 m in thickness. EKG NSR 78. Given tylenol 650mg x 1 for headache. (13 Mar 2019 00:45)      ----  INTERVAL HPI/OVERNIGHT EVENTS: Pt seen and evaluated at the bedside. No acute overnight events occurred. Still reports tension headache, not worse in morning, constant, no visual aura, but slightly improved from yesterday. No weakness or additional episodes of not being able to move. Reports she gets pulsatile tinnitus, saw optho in past and was never told she had papilledema, just cataracts forgets last time she was there. Denies any sharp shooting pains.    ----  PAST MEDICAL & SURGICAL HISTORY:  Interstitial lung disease: on home o2 prn  NHL (non-Hodgkin's lymphoma): Agem 45 sp chemo/rt/stem cell  Transient cerebral ischemia, unspecified type  Mitral prolapse  History of tonsillectomy  History of appendectomy      FAMILY HISTORY:  Family history of stroke  Family history of breast cancer: Mother      ----  MEDICATIONS  (STANDING):  diltiazem    milliGRAM(s) Oral daily    MEDICATIONS  (PRN):  acetaminophen   Tablet .. 650 milliGRAM(s) Oral every 6 hours PRN Mild Pain (1 - 3)  ondansetron Injectable 4 milliGRAM(s) IV Push every 6 hours PRN Nausea      ----  REVIEW OF SYSTEMS:  CONSTITUTIONAL: denies fever, chills, fatigue, weakness  HEENT: denies blurred vision, sore throat  SKIN: denies new lesions, rash  CARDIOVASCULAR: denies chest pain, chest pressure, palpitations  RESPIRATORY: denies shortness of breath, sputum production  GASTROINTESTINAL: denies nausea, vomiting, diarrhea, abdominal pain  GENITOURINARY: denies dysuria, discharge  NEUROLOGICAL: denies numbness, focal weakness, Endorses HA  MUSCULOSKELETAL: denies new joint pain, muscle aches  HEMATOLOGIC: denies gross bleeding, bruising  LYMPHATICS: denies enlarged lymph nodes, extremity swelling  PSYCHIATRIC: denies recent changes in anxiety, depression  ENDOCRINOLOGIC: denies sweating, cold or heat intolerance    ----  PHYSICAL EXAM:  GENERAL: patient appears well, no acute distress  EYES: sclera clear, no exudates, PERLL  ENMT: oropharynx clear without erythema, no exudates, moist mucous membranes, no stridor  NECK: supple, soft, no thyromegaly noted  LUNGS: good air entry bilaterally, clear to auscultation, symmetric breath sounds, no wheezing or rhonchi appreciated  HEART: soft S1/S2, regular rate and rhythm, systolic murmur w/ midsystolic click  GASTROINTESTINAL: abdomen is soft, nontender, nondistended, normoactive bowel sounds, no palpable masses  INTEGUMENT: good skin turgor, no lesions noted  MUSCULOSKELETAL: no clubbing or cyanosis, no obvious deformity  NEUROLOGIC: awake, alert, oriented x3, good muscle tone in 4 extremities, no obvious sensory deficits, CN II-XII grossly intact, finger to nose normal, strength 4/5 throughout  PSYCHIATRIC: appears anxious  HEME/LYMPH: no palpable supraclavicular nodules, no obvious ecchymosis or petechiae         T(C): 36.6 (03-13-19 @ 08:15), Max: 36.6 (03-12-19 @ 20:47)  HR: 79 (03-13-19 @ 08:15) (60 - 81)  BP: 125/79 (03-13-19 @ 08:15) (112/72 - 144/87)  RR: 16 (03-13-19 @ 08:15) (16 - 18)  SpO2: 98% (03-13-19 @ 08:15) (95% - 99%)  Wt(kg): --    ----  I&O's Summary    12 Mar 2019 07:01  -  13 Mar 2019 07:00  --------------------------------------------------------  IN: 300 mL / OUT: 0 mL / NET: 300 mL        LABS:                        13.1   6.43  )-----------( 294      ( 13 Mar 2019 06:21 )             40.6     03-13    142  |  105  |  17  ----------------------------<  91  4.4   |  32<H>  |  0.82    Ca    9.2      13 Mar 2019 06:21  Phos  3.5     03-13  Mg     2.3     03-13    TPro  7.5  /  Alb  3.4  /  TBili  0.3  /  DBili  x   /  AST  16  /  ALT  15  /  AlkPhos  110  03-12    PT/INR - ( 12 Mar 2019 21:35 )   PT: 10.8 sec;   INR: 0.95 ratio         PTT - ( 12 Mar 2019 21:35 )  PTT:34.3 sec    CAPILLARY BLOOD GLUCOSE                    ----  Personally reviewed:  Vital sign trends: [ x ] yes    [  ] no     [  ] n/a  Laboratory results: [ x ] yes    [  ] no     [  ] n/a  Radiology results: [x  ] yes    [  ] no     [  ] n/a  Culture results: [  ] yes    [  ] no     [  ] n/a  Consultant recommendations: [ x ] yes    [  ] no     [  ] n/a Patient is a 59y old  Female who presents with a chief complaint of Arm weakness (13 Mar 2019 00:45)      FROM ADMISSION H+P:   HPI:  59F with PMHx of MVP, chronic subdural hematoma, interstitial lung disease( on home o2 PRN), non-hodgkin's lymphoma, tachycardia( on diltiazem), meniere's disease, neuropathy presents after sudden onset of b/l UE weakness. Patient was on the phone with her daughter and had sudden onset of b/l upper extremity weakness which caused her to drop the phone. Had just finished PT exercises prior to episode.  Patient states that she could not  anything or move her arms.  who witnessed event states that patient had a look of panic on her face, whole body was limp, no slurring of words noted. Patient states that numbness lasted approximately 15 minutes. Per patient this episode was accompanied by dizziness and blurred vision. Patient admits to headache, photosensitivity photosensitivity, nausea, difficulty concentrating. Patient noted to have recent chronic headaches which she is following with neurology for as outpatient but states that her current headache is worse than normal.  Patient denies new CP, SOB, abdominal pain, diarrhea. Of note patient states that a few months ago she had a right sided catheterization to evaluate for pulmonary HTN and has had arm numbness and pain on and off since.  Pt also with recent R sided PTX, which has recurred , and is being followed by outpt pulm with freq xr, recent CT. No trauma, no LOC, no falls.    In the ED VS: WNL. Labs WNL. CXR: no acute changes. CT head: old b/l frontal subdural hematomas slightly decreased in size, mild chronic ischemic changes in frontoparietal white matter. CT neck: Abnormal density in the ventral epidural space extending from C2 through C3-C4 measuring up to 5 m in thickness. EKG NSR 78. Given tylenol 650mg x 1 for headache. (13 Mar 2019 00:45)      ----  INTERVAL HPI/OVERNIGHT EVENTS: Pt seen and evaluated at the bedside. No acute overnight events occurred. Still reports tension headache, not worse in morning, constant, no visual aura, but slightly improved from yesterday. No weakness or additional episodes of not being able to move. Reports she gets pulsatile tinnitus and early morning headaches, saw optho in past and was never told she had papilledema, just cataracts forgets last time she was there. Denies any sharp shooting pains. States she had a recent MRI/MRA which was normal, doesnt recall when. Was told in the past maybe it was GBS, which was rule out, but still possibly could be CIDP    ----  PAST MEDICAL & SURGICAL HISTORY:  Interstitial lung disease: on home o2 prn  NHL (non-Hodgkin's lymphoma): Agem 45 sp chemo/rt/stem cell  Transient cerebral ischemia, unspecified type  Mitral prolapse  History of tonsillectomy  History of appendectomy      FAMILY HISTORY:  Family history of stroke  Family history of breast cancer: Mother      ----  MEDICATIONS  (STANDING):  diltiazem    milliGRAM(s) Oral daily    MEDICATIONS  (PRN):  acetaminophen   Tablet .. 650 milliGRAM(s) Oral every 6 hours PRN Mild Pain (1 - 3)  ondansetron Injectable 4 milliGRAM(s) IV Push every 6 hours PRN Nausea      ----  REVIEW OF SYSTEMS:  CONSTITUTIONAL: denies fever, chills, fatigue, weakness  HEENT: denies blurred vision, sore throat  SKIN: denies new lesions, rash  CARDIOVASCULAR: denies chest pain, chest pressure, palpitations  RESPIRATORY: denies shortness of breath, sputum production  GASTROINTESTINAL: denies nausea, vomiting, diarrhea, abdominal pain  GENITOURINARY: denies dysuria, discharge  NEUROLOGICAL: denies numbness, focal weakness, Endorses HA  MUSCULOSKELETAL: denies new joint pain, muscle aches  HEMATOLOGIC: denies gross bleeding, bruising  LYMPHATICS: denies enlarged lymph nodes, extremity swelling  PSYCHIATRIC: denies recent changes in anxiety, depression  ENDOCRINOLOGIC: denies sweating, cold or heat intolerance    ----  PHYSICAL EXAM:  GENERAL: patient appears well, no acute distress  EYES: sclera clear, no exudates, PERLL  ENMT: oropharynx clear without erythema, no exudates, moist mucous membranes, no stridor  NECK: supple, soft, no thyromegaly noted  LUNGS: good air entry bilaterally, clear to auscultation, symmetric breath sounds, no wheezing or rhonchi appreciated  HEART: soft S1/S2, regular rate and rhythm, systolic murmur w/ midsystolic click  GASTROINTESTINAL: abdomen is soft, nontender, nondistended, normoactive bowel sounds, no palpable masses  INTEGUMENT: good skin turgor, no lesions noted  MUSCULOSKELETAL: no clubbing or cyanosis, no obvious deformity  NEUROLOGIC: awake, alert, oriented x3, good muscle tone in 4 extremities, no obvious sensory deficits, CN II-XII grossly intact, finger to nose normal, strength 4/5 throughout  PSYCHIATRIC: appears anxious  HEME/LYMPH: no palpable supraclavicular nodules, no obvious ecchymosis or petechiae         T(C): 36.6 (03-13-19 @ 08:15), Max: 36.6 (03-12-19 @ 20:47)  HR: 79 (03-13-19 @ 08:15) (60 - 81)  BP: 125/79 (03-13-19 @ 08:15) (112/72 - 144/87)  RR: 16 (03-13-19 @ 08:15) (16 - 18)  SpO2: 98% (03-13-19 @ 08:15) (95% - 99%)  Wt(kg): --    ----  I&O's Summary    12 Mar 2019 07:01  -  13 Mar 2019 07:00  --------------------------------------------------------  IN: 300 mL / OUT: 0 mL / NET: 300 mL        LABS:                        13.1   6.43  )-----------( 294      ( 13 Mar 2019 06:21 )             40.6     03-13    142  |  105  |  17  ----------------------------<  91  4.4   |  32<H>  |  0.82    Ca    9.2      13 Mar 2019 06:21  Phos  3.5     03-13  Mg     2.3     03-13    TPro  7.5  /  Alb  3.4  /  TBili  0.3  /  DBili  x   /  AST  16  /  ALT  15  /  AlkPhos  110  03-12    PT/INR - ( 12 Mar 2019 21:35 )   PT: 10.8 sec;   INR: 0.95 ratio         PTT - ( 12 Mar 2019 21:35 )  PTT:34.3 sec    CAPILLARY BLOOD GLUCOSE                    ----  Personally reviewed:  Vital sign trends: [ x ] yes    [  ] no     [  ] n/a  Laboratory results: [ x ] yes    [  ] no     [  ] n/a  Radiology results: [x  ] yes    [  ] no     [  ] n/a  Culture results: [  ] yes    [  ] no     [  ] n/a  Consultant recommendations: [ x ] yes    [  ] no     [  ] n/a Patient is a 59y old  Female who presents with a chief complaint of Arm weakness (13 Mar 2019 00:45)      FROM ADMISSION H+P:   HPI:  59F with PMHx of MVP, chronic subdural hematoma, interstitial lung disease( on home o2 PRN), non-hodgkin's lymphoma, tachycardia( on diltiazem), meniere's disease, neuropathy presents after sudden onset of b/l UE weakness. Patient was on the phone with her daughter and had sudden onset of b/l upper extremity weakness which caused her to drop the phone. Had just finished PT exercises prior to episode.  Patient states that she could not  anything or move her arms.  who witnessed event states that patient had a look of panic on her face, whole body was limp, no slurring of words noted. Patient states that numbness lasted approximately 15 minutes. Per patient this episode was accompanied by dizziness and blurred vision. Patient admits to headache, photosensitivity photosensitivity, nausea, difficulty concentrating. Patient noted to have recent chronic headaches which she is following with neurology for as outpatient but states that her current headache is worse than normal.  Patient denies new CP, SOB, abdominal pain, diarrhea. Of note patient states that a few months ago she had a right sided catheterization to evaluate for pulmonary HTN and has had arm numbness and pain on and off since.  Pt also with recent R sided PTX, which has recurred , and is being followed by outpt pulm with freq xr, recent CT. No trauma, no LOC, no falls.    In the ED VS: WNL. Labs WNL. CXR: no acute changes. CT head: old b/l frontal subdural hematomas slightly decreased in size, mild chronic ischemic changes in frontoparietal white matter. CT neck: Abnormal density in the ventral epidural space extending from C2 through C3-C4 measuring up to 5 m in thickness. EKG NSR 78. Given tylenol 650mg x 1 for headache. (13 Mar 2019 00:45)      ----  INTERVAL HPI/OVERNIGHT EVENTS: Pt seen and evaluated at the bedside. No acute overnight events occurred. Still reports tension headache, worse in morning, constant, no visual aura, but slightly improved from yesterday. No weakness or additional episodes of not being able to move. Reports she gets pulsatile tinnitus and early morning headaches, saw optho in past and was never told she had papilledema, just cataracts forgets last time she was there. Denies any sharp shooting pains. States she had a recent MRI/MRA which was normal, doesnt recall when. Was told in the past maybe it was GBS, which was rule out, but still possibly could be CIDP    ----  PAST MEDICAL & SURGICAL HISTORY:  Interstitial lung disease: on home o2 prn  NHL (non-Hodgkin's lymphoma): Agem 45 sp chemo/rt/stem cell  Transient cerebral ischemia, unspecified type  Mitral prolapse  History of tonsillectomy  History of appendectomy      FAMILY HISTORY:  Family history of stroke  Family history of breast cancer: Mother      ----  MEDICATIONS  (STANDING):  diltiazem    milliGRAM(s) Oral daily    MEDICATIONS  (PRN):  acetaminophen   Tablet .. 650 milliGRAM(s) Oral every 6 hours PRN Mild Pain (1 - 3)  ondansetron Injectable 4 milliGRAM(s) IV Push every 6 hours PRN Nausea      ----  REVIEW OF SYSTEMS:  CONSTITUTIONAL: denies fever, chills, fatigue, weakness  HEENT: denies blurred vision, sore throat  SKIN: denies new lesions, rash  CARDIOVASCULAR: denies chest pain, chest pressure, palpitations  RESPIRATORY: denies shortness of breath, sputum production  GASTROINTESTINAL: denies nausea, vomiting, diarrhea, abdominal pain  GENITOURINARY: denies dysuria, discharge  NEUROLOGICAL: denies numbness, focal weakness, Endorses HA  MUSCULOSKELETAL: denies new joint pain, muscle aches  HEMATOLOGIC: denies gross bleeding, bruising  LYMPHATICS: denies enlarged lymph nodes, extremity swelling  PSYCHIATRIC: denies recent changes in anxiety, depression  ENDOCRINOLOGIC: denies sweating, cold or heat intolerance    ----  PHYSICAL EXAM:  GENERAL: patient appears well, no acute distress  EYES: sclera clear, no exudates, PERLL  ENMT: oropharynx clear without erythema, no exudates, moist mucous membranes, no stridor  NECK: supple, soft, no thyromegaly noted  LUNGS: good air entry bilaterally, clear to auscultation, symmetric breath sounds, no wheezing or rhonchi appreciated  HEART: soft S1/S2, regular rate and rhythm, systolic murmur w/ midsystolic click  GASTROINTESTINAL: abdomen is soft, nontender, nondistended, normoactive bowel sounds, no palpable masses  INTEGUMENT: good skin turgor, no lesions noted  MUSCULOSKELETAL: no clubbing or cyanosis, no obvious deformity  NEUROLOGIC: awake, alert, oriented x3, good muscle tone in 4 extremities, no obvious sensory deficits, CN II-XII grossly intact, finger to nose normal, strength 4/5 throughout  PSYCHIATRIC: appears anxious  HEME/LYMPH: no palpable supraclavicular nodules, no obvious ecchymosis or petechiae         T(C): 36.6 (03-13-19 @ 08:15), Max: 36.6 (03-12-19 @ 20:47)  HR: 79 (03-13-19 @ 08:15) (60 - 81)  BP: 125/79 (03-13-19 @ 08:15) (112/72 - 144/87)  RR: 16 (03-13-19 @ 08:15) (16 - 18)  SpO2: 98% (03-13-19 @ 08:15) (95% - 99%)  Wt(kg): --    ----  I&O's Summary    12 Mar 2019 07:01  -  13 Mar 2019 07:00  --------------------------------------------------------  IN: 300 mL / OUT: 0 mL / NET: 300 mL        LABS:                        13.1   6.43  )-----------( 294      ( 13 Mar 2019 06:21 )             40.6     03-13    142  |  105  |  17  ----------------------------<  91  4.4   |  32<H>  |  0.82    Ca    9.2      13 Mar 2019 06:21  Phos  3.5     03-13  Mg     2.3     03-13    TPro  7.5  /  Alb  3.4  /  TBili  0.3  /  DBili  x   /  AST  16  /  ALT  15  /  AlkPhos  110  03-12    PT/INR - ( 12 Mar 2019 21:35 )   PT: 10.8 sec;   INR: 0.95 ratio         PTT - ( 12 Mar 2019 21:35 )  PTT:34.3 sec    CAPILLARY BLOOD GLUCOSE                    ----  Personally reviewed:  Vital sign trends: [ x ] yes    [  ] no     [  ] n/a  Laboratory results: [ x ] yes    [  ] no     [  ] n/a  Radiology results: [x  ] yes    [  ] no     [  ] n/a  Culture results: [  ] yes    [  ] no     [  ] n/a  Consultant recommendations: [ x ] yes    [  ] no     [  ] n/a Patient is a 59y old  Female who presents with a chief complaint of Arm weakness (13 Mar 2019 00:45)      FROM ADMISSION H+P:   HPI:  59F with PMHx of MVP, chronic subdural hematoma, interstitial lung disease( on home o2 PRN), non-hodgkin's lymphoma, tachycardia( on diltiazem), meniere's disease, neuropathy presents after sudden onset of b/l UE weakness. Patient was on the phone with her daughter and had sudden onset of b/l upper extremity weakness which caused her to drop the phone. Had just finished PT exercises prior to episode.  Patient states that she could not  anything or move her arms.  who witnessed event states that patient had a look of panic on her face, whole body was limp, no slurring of words noted. Patient states that numbness lasted approximately 15 minutes. Per patient this episode was accompanied by dizziness and blurred vision. Patient admits to headache, photosensitivity photosensitivity, nausea, difficulty concentrating. Patient noted to have recent chronic headaches which she is following with neurology for as outpatient but states that her current headache is worse than normal.  Patient denies new CP, SOB, abdominal pain, diarrhea. Of note patient states that a few months ago she had a right sided catheterization to evaluate for pulmonary HTN and has had arm numbness and pain on and off since.  Pt also with recent R sided PTX, which has recurred , and is being followed by outpt pulm with freq xr, recent CT. No trauma, no LOC, no falls.    In the ED VS: WNL. Labs WNL. CXR: no acute changes. CT head: old b/l frontal subdural hematomas slightly decreased in size, mild chronic ischemic changes in frontoparietal white matter. CT neck: Abnormal density in the ventral epidural space extending from C2 through C3-C4 measuring up to 5 m in thickness. EKG NSR 78. Given tylenol 650mg x 1 for headache. (13 Mar 2019 00:45)      ----  INTERVAL HPI/OVERNIGHT EVENTS: Pt seen and evaluated at the bedside. No acute overnight events occurred. Still reports tension headache, worse in morning, constant, no visual aura, but slightly improved from yesterday. No weakness or additional episodes of not being able to move. Reports she gets pulsatile tinnitus and early morning headaches, saw optho in past and was never told she had papilledema, just cataracts forgets last time she was there. Denies any sharp shooting pains. States she had a recent MRI/MRA which was normal, doesnt recall when. Was told in the past maybe it was GBS, which she reports was ruled out, but still possibly could be CIDP    ----  PAST MEDICAL & SURGICAL HISTORY:  Interstitial lung disease: on home o2 prn  NHL (non-Hodgkin's lymphoma): Agem 45 sp chemo/rt/stem cell  Transient cerebral ischemia, unspecified type  Mitral prolapse  History of tonsillectomy  History of appendectomy      FAMILY HISTORY:  Family history of stroke  Family history of breast cancer: Mother      ----  MEDICATIONS  (STANDING):  diltiazem    milliGRAM(s) Oral daily    MEDICATIONS  (PRN):  acetaminophen   Tablet .. 650 milliGRAM(s) Oral every 6 hours PRN Mild Pain (1 - 3)  ondansetron Injectable 4 milliGRAM(s) IV Push every 6 hours PRN Nausea      ----  REVIEW OF SYSTEMS:  CONSTITUTIONAL: denies fever, chills, fatigue, weakness  HEENT: denies blurred vision, sore throat  SKIN: denies new lesions, rash  CARDIOVASCULAR: denies chest pain, chest pressure, palpitations  RESPIRATORY: denies shortness of breath, sputum production  GASTROINTESTINAL: denies nausea, vomiting, diarrhea, abdominal pain  GENITOURINARY: denies dysuria, discharge  NEUROLOGICAL: denies numbness, focal weakness, Endorses HA  MUSCULOSKELETAL: denies new joint pain, muscle aches  HEMATOLOGIC: denies gross bleeding, bruising  LYMPHATICS: denies enlarged lymph nodes, extremity swelling  PSYCHIATRIC: denies recent changes in anxiety, depression  ENDOCRINOLOGIC: denies sweating, cold or heat intolerance    ----  PHYSICAL EXAM:  GENERAL: patient appears well, no acute distress  EYES: sclera clear, no exudates, PERLL  ENMT: oropharynx clear without erythema, no exudates, moist mucous membranes, no stridor  NECK: supple, soft, no thyromegaly noted  LUNGS: good air entry bilaterally, clear to auscultation, symmetric breath sounds, no wheezing or rhonchi appreciated  HEART: soft S1/S2, regular rate and rhythm, systolic murmur w/ midsystolic click  GASTROINTESTINAL: abdomen is soft, nontender, nondistended, normoactive bowel sounds, no palpable masses  INTEGUMENT: good skin turgor, no lesions noted  MUSCULOSKELETAL: no clubbing or cyanosis, no obvious deformity  NEUROLOGIC: awake, alert, oriented x3, good muscle tone in 4 extremities, no obvious sensory deficits, CN II-XII grossly intact, finger to nose normal, strength 4/5 throughout  PSYCHIATRIC: appears anxious  HEME/LYMPH: no palpable supraclavicular nodules, no obvious ecchymosis or petechiae         T(C): 36.6 (03-13-19 @ 08:15), Max: 36.6 (03-12-19 @ 20:47)  HR: 79 (03-13-19 @ 08:15) (60 - 81)  BP: 125/79 (03-13-19 @ 08:15) (112/72 - 144/87)  RR: 16 (03-13-19 @ 08:15) (16 - 18)  SpO2: 98% (03-13-19 @ 08:15) (95% - 99%)  Wt(kg): --    ----  I&O's Summary    12 Mar 2019 07:01  -  13 Mar 2019 07:00  --------------------------------------------------------  IN: 300 mL / OUT: 0 mL / NET: 300 mL        LABS:                        13.1   6.43  )-----------( 294      ( 13 Mar 2019 06:21 )             40.6     03-13    142  |  105  |  17  ----------------------------<  91  4.4   |  32<H>  |  0.82    Ca    9.2      13 Mar 2019 06:21  Phos  3.5     03-13  Mg     2.3     03-13    TPro  7.5  /  Alb  3.4  /  TBili  0.3  /  DBili  x   /  AST  16  /  ALT  15  /  AlkPhos  110  03-12    PT/INR - ( 12 Mar 2019 21:35 )   PT: 10.8 sec;   INR: 0.95 ratio         PTT - ( 12 Mar 2019 21:35 )  PTT:34.3 sec    CAPILLARY BLOOD GLUCOSE                    ----  Personally reviewed:  Vital sign trends: [ x ] yes    [  ] no     [  ] n/a  Laboratory results: [ x ] yes    [  ] no     [  ] n/a  Radiology results: [x  ] yes    [  ] no     [  ] n/a  Culture results: [  ] yes    [  ] no     [  ] n/a  Consultant recommendations: [ x ] yes    [  ] no     [  ] n/a

## 2019-03-13 NOTE — DISCHARGE NOTE PROVIDER - NSDCFUADDINST_GEN_ALL_CORE_FT
Please follow up with your PCP, Pulmonologist, Neurology within one week of discharge.  If you begin to have worsening of your symptoms please seek medical attention Please follow up with your PCP, Pulmonologist, Neurologist within one week of discharge.  If you begin to have worsening of your symptoms please seek medical attention

## 2019-03-13 NOTE — H&P ADULT - HISTORY OF PRESENT ILLNESS
58F with PMHx of MVP, chronic subdural hematoma, interstitial lung disease( on home o2 PRN), non-hodgkin's lymphoma, tachycardia( on diltiazem), meniere's disease, neuropathy presents after sudden onset of b/l UE weakness. Patient was on the phone with her daughter and had sudden onset of b/l upper extremity weakness which caused her to drop the phone. Had just finished PT exercises prior to episode.  Patient states that she could not  anything or move her arms.  who witnessed event states that patient had a look of panic on her face, whole body was limp, no slurring of words noted. Patient states that numbness lasted approximately 15 minutes. Per patient this episode was accompanied by dizziness and blurred vision. Patient admits to headache, photosensitivity photosensitivity, nausea, difficulty concentrating. Patient noted to have recent chronic headaches which she is following with neurology for as outpatient but states that her current headache is worse than normal.  Patient denies new CP, SOB, abdominal pain, diarrhea. Of note patient states that a few months ago she had a right sided catheterization to evaluate for pulmonary HTN and has had arm numbness and pain on and off since.  Pt also with recent R sided PTX, which has recurred , and is being followed by outpt pulm with freq xr, recent CT. No trauma, no LOC, no falls.    In the ED VS: WNL. Labs WNL. CXR: no acute changes. CT head: old b/l frontal subdural hematomas slightly decreased in size, mild chronic ischemic changes in frontoparietal white matter. CT neck: Abnormal density in the ventral epidural space extending from C2 through C3-C4 measuring up to 5 m in thickness. EKG NSR 78. Given tylenol 650mg x 1 for headache. 59F with PMHx of MVP, chronic subdural hematoma, interstitial lung disease( on home o2 PRN), non-hodgkin's lymphoma, tachycardia( on diltiazem), meniere's disease, neuropathy presents after sudden onset of b/l UE weakness. Patient was on the phone with her daughter and had sudden onset of b/l upper extremity weakness which caused her to drop the phone. Had just finished PT exercises prior to episode.  Patient states that she could not  anything or move her arms.  who witnessed event states that patient had a look of panic on her face, whole body was limp, no slurring of words noted. Patient states that numbness lasted approximately 15 minutes. Per patient this episode was accompanied by dizziness and blurred vision. Patient admits to headache, photosensitivity photosensitivity, nausea, difficulty concentrating. Patient noted to have recent chronic headaches which she is following with neurology for as outpatient but states that her current headache is worse than normal.  Patient denies new CP, SOB, abdominal pain, diarrhea. Of note patient states that a few months ago she had a right sided catheterization to evaluate for pulmonary HTN and has had arm numbness and pain on and off since.  Pt also with recent R sided PTX, which has recurred , and is being followed by outpt pulm with freq xr, recent CT. No trauma, no LOC, no falls.    In the ED VS: WNL. Labs WNL. CXR: no acute changes. CT head: old b/l frontal subdural hematomas slightly decreased in size, mild chronic ischemic changes in frontoparietal white matter. CT neck: Abnormal density in the ventral epidural space extending from C2 through C3-C4 measuring up to 5 m in thickness. EKG NSR 78. Given tylenol 650mg x 1 for headache.

## 2019-03-13 NOTE — PROGRESS NOTE ADULT - ATTENDING COMMENTS
Agree with plan and exam as above with the following: Pt had extensive work up done with outpatient neurologist after discussing case with Dr. Hernandez. Will await further recs from neuro at this time. PT to see patient. rest agree as above.

## 2019-03-13 NOTE — H&P ADULT - NSHPPHYSICALEXAM_GEN_ALL_CORE
T(C): 36.6 (03-12-19 @ 20:47), Max: 36.6 (03-12-19 @ 20:47)  HR: 77 (03-12-19 @ 23:25) (77 - 81)  BP: 127/70 (03-12-19 @ 23:25) (127/70 - 144/87)  RR: 16 (03-12-19 @ 23:25) (16 - 18)  SpO2: 96% (03-12-19 @ 23:25) (96% - 99%)    GENERAL: patient appears well, found laying in the dark with wet towel over eyes   EYES: sclera clear, no exudates  ENMT: oropharynx clear without erythema, no exudates, moist mucous membranes  NECK: supple, soft, no thyromegaly noted  LUNGS: good air entry bilaterally, clear to auscultation, symmetric breath sounds, no wheezing or rhonchi appreciated  HEART: soft S1/S2, regular rate and rhythm, no murmurs noted, no lower extremity edema  GASTROINTESTINAL: abdomen is soft, nondistended, normoactive bowel sounds, no palpable masses, TTP in LUQ  INTEGUMENT: good skin turgor, no lesions noted  MUSCULOSKELETAL: no clubbing or cyanosis, no obvious deformity  NEUROLOGIC: awake, alert, oriented x3, good muscle tone in 4 extremities, no obvious sensory deficits  PSYCHIATRIC: mood is good, affect is congruent, linear and logical thought process

## 2019-03-13 NOTE — DISCHARGE NOTE PROVIDER - HOSPITAL COURSE
FROM ADMISSION H+P:     HPI:    59F with PMHx of MVP, chronic subdural hematoma, interstitial lung disease( on home o2 PRN), non-hodgkin's lymphoma, tachycardia( on diltiazem), meniere's disease, neuropathy presents after sudden onset of b/l UE weakness. Patient was on the phone with her daughter and had sudden onset of b/l upper extremity weakness which caused her to drop the phone. Had just finished PT exercises prior to episode.  Patient states that she could not  anything or move her arms.  who witnessed event states that patient had a look of panic on her face, whole body was limp, no slurring of words noted. Patient states that numbness lasted approximately 15 minutes. Per patient this episode was accompanied by dizziness and blurred vision. Patient admits to headache, photosensitivity photosensitivity, nausea, difficulty concentrating. Patient noted to have recent chronic headaches which she is following with neurology for as outpatient but states that her current headache is worse than normal.  Patient denies new CP, SOB, abdominal pain, diarrhea. Of note patient states that a few months ago she had a right sided catheterization to evaluate for pulmonary HTN and has had arm numbness and pain on and off since.  Pt also with recent R sided PTX, which has recurred , and is being followed by outpt pulm with freq xr, recent CT. No trauma, no LOC, no falls.        In the ED VS: WNL. Labs WNL. CXR: no acute changes. CT head: old b/l frontal subdural hematomas slightly decreased in size, mild chronic ischemic changes in frontoparietal white matter. CT neck: Abnormal density in the ventral epidural space extending from C2 through C3-C4 measuring up to 5 m in thickness. foraminal narrowing C5-C6, C6-C7EKG NSR 78. Given tylenol 650mg x 1 for headache. (13 Mar 2019 00:45)    Neurology was consulted            ---    HOSPITAL COURSE:     Pt was admitted to [facility] for r/o TIA. Pt was started on atorvastatin 40mg, but not ASA as pt states she is unable to take this medication TTE revealed: ***lipid panel revealed: **duplex carotids revealed:** Pt reported that in the past she was worked up for GBS and was told she did not have that, but that it might still be CIDP. Pt reported she had MRA done in past which was normal and EMG done in past which showed neuropathy in her b/l LE but not upper extremities. Given CT findings pt would have benefited from MRI with IV contrast, but  IV contrast allergy causing anaphylaxis in past so this was not obtained.        ---    CONSULTANTS:     Neuro: Veda    ---    TIME SPENT:    The total amount of time spent reviewing the hospital notes, laboratory values, imaging findings, assessing/counseling the patient, discussing with consultant physicians, social work, nursing staff took -- minutes        ---    FINAL DISCHARGE DIAGNOSIS LIST:    Please see last daily progress note for final discharge diagnoses        ---    Primary care provider was made aware of plan for discharge:      [  ] NO     [  ] YES FROM ADMISSION H+P:     HPI:    59F with PMHx of MVP, chronic subdural hematoma, interstitial lung disease( on home o2 PRN), non-hodgkin's lymphoma, tachycardia( on diltiazem), meniere's disease, neuropathy presents after sudden onset of b/l UE weakness. Patient was on the phone with her daughter and had sudden onset of b/l upper extremity weakness which caused her to drop the phone. Had just finished PT exercises prior to episode.  Patient states that she could not  anything or move her arms.  who witnessed event states that patient had a look of panic on her face, whole body was limp, no slurring of words noted. Patient states that numbness lasted approximately 15 minutes. Per patient this episode was accompanied by dizziness and blurred vision. Patient admits to headache, photosensitivity photosensitivity, nausea, difficulty concentrating. Patient noted to have recent chronic headaches which she is following with neurology for as outpatient but states that her current headache is worse than normal.  Patient denies new CP, SOB, abdominal pain, diarrhea. Of note patient states that a few months ago she had a right sided catheterization to evaluate for pulmonary HTN and has had arm numbness and pain on and off since.  Pt also with recent R sided PTX, which has recurred , and is being followed by outpt pulm with freq xr, recent CT. No trauma, no LOC, no falls.        In the ED VS: WNL. Labs WNL. CXR: no acute changes. CT head: old b/l frontal subdural hematomas slightly decreased in size, mild chronic ischemic changes in frontoparietal white matter. CT neck: Abnormal density in the ventral epidural space extending from C2 through C3-C4 measuring up to 5 m in thickness. foraminal narrowing C5-C6, C6-C7EKG NSR 78. Given tylenol 650mg x 1 for headache. (13 Mar 2019 00:45)    Neurology was consulted            ---    HOSPITAL COURSE:     Pt was admitted to [facility] for r/o TIA. Pt was started on atorvastatin 40mg, but not ASA as pt states she is unable to take this medication MRI: showed multiple patchy confluent nonspecific white matter foci of t2/FLAIR favoring microvascular dz, no acute infarct. TTE revealed: ***lipid panel was wnl. duplex carotids revealed: no evidence of stenosis. Pt reported that in the past she was worked up for GBS and was told she did not have that, but that it might still be CIDP. Pt reported she had MRA done in past which was normal and EMG done in past which showed neuropathy in her b/l LE but not upper extremities. Given CT findings pt would have benefited from MRI with IV contrast, but  IV contrast allergy causing anaphylaxis in past so this was not obtained.        ---    CONSULTANTS:     Neuro: Veda    ---    TIME SPENT:    The total amount of time spent reviewing the hospital notes, laboratory values, imaging findings, assessing/counseling the patient, discussing with consultant physicians, social work, nursing staff took -- minutes        ---    FINAL DISCHARGE DIAGNOSIS LIST:    Please see last daily progress note for final discharge diagnoses        ---    Primary care provider was made aware of plan for discharge:      [  ] NO     [  ] YES FROM ADMISSION H+P:     HPI:    59F with PMHx of MVP, chronic subdural hematoma, interstitial lung disease( on home o2 PRN), non-hodgkin's lymphoma, tachycardia( on diltiazem), meniere's disease, neuropathy presents after sudden onset of b/l UE weakness. Patient was on the phone with her daughter and had sudden onset of b/l upper extremity weakness which caused her to drop the phone. Had just finished PT exercises prior to episode.  Patient states that she could not  anything or move her arms.  who witnessed event states that patient had a look of panic on her face, whole body was limp, no slurring of words noted. Patient states that numbness lasted approximately 15 minutes. Per patient this episode was accompanied by dizziness and blurred vision. Patient admits to headache, photosensitivity photosensitivity, nausea, difficulty concentrating. Patient noted to have recent chronic headaches which she is following with neurology for as outpatient but states that her current headache is worse than normal.  Patient denies new CP, SOB, abdominal pain, diarrhea. Of note patient states that a few months ago she had a right sided catheterization to evaluate for pulmonary HTN and has had arm numbness and pain on and off since.  Pt also with recent R sided PTX, which has recurred , and is being followed by outpt pulm with freq xr, recent CT. No trauma, no LOC, no falls.        In the ED VS: WNL. Labs WNL. CXR: no acute changes. CT head: old b/l frontal subdural hematomas slightly decreased in size, mild chronic ischemic changes in frontoparietal white matter. CT neck: Abnormal density in the ventral epidural space extending from C2 through C3-C4 measuring up to 5 m in thickness. foraminal narrowing C5-C6, C6-C7EKG NSR 78. Given tylenol 650mg x 1 for headache. (13 Mar 2019 00:45)    Neurology was consulted            ---    HOSPITAL COURSE:     Pt was admitted to [facility] for r/o TIA. Pt was started on atorvastatin 40mg, but not ASA as pt states she is unable to take this medication MRI: showed multiple patchy confluent nonspecific white matter foci of t2/FLAIR favoring microvascular dz, no acute infarct. TTE was not obtained as pt did not want to go through test as she reported she just got one done recently and it was normal. lipid panel was wnl. duplex carotids revealed: no evidence of stenosis. Pt reported that in the past she was worked up for GBS and was told she did not have that, but that it might still be CIDP. Pt reported she had MRA done in past which was normal and EMG done in past which showed neuropathy in her b/l LE but not upper extremities. Given CT findings pt would have benefited from MRI with IV contrast, but  IV contrast allergy causing anaphylaxis in past so this was not obtained.        ---    CONSULTANTS:     Neuro: Veda    ---    TIME SPENT:    The total amount of time spent reviewing the hospital notes, laboratory values, imaging findings, assessing/counseling the patient, discussing with consultant physicians, social work, nursing staff took -- minutes        ---    FINAL DISCHARGE DIAGNOSIS LIST:    Please see last daily progress note for final discharge diagnoses        ---    Primary care provider was made aware of plan for discharge:      [  ] NO     [  ] YES FROM ADMISSION H+P:     HPI:    59F with PMHx of MVP, chronic subdural hematoma, interstitial lung disease( on home o2 PRN), non-hodgkin's lymphoma, tachycardia( on diltiazem), meniere's disease, neuropathy presents after sudden onset of b/l UE weakness. Patient was on the phone with her daughter and had sudden onset of b/l upper extremity weakness which caused her to drop the phone. Had just finished PT exercises prior to episode.  Patient states that she could not  anything or move her arms.  who witnessed event states that patient had a look of panic on her face, whole body was limp, no slurring of words noted. Patient states that numbness lasted approximately 15 minutes. Per patient this episode was accompanied by dizziness and blurred vision. Patient admits to headache, photosensitivity photosensitivity, nausea, difficulty concentrating. Patient noted to have recent chronic headaches which she is following with neurology for as outpatient but states that her current headache is worse than normal.  Patient denies new CP, SOB, abdominal pain, diarrhea. Of note patient states that a few months ago she had a right sided catheterization to evaluate for pulmonary HTN and has had arm numbness and pain on and off since.  Pt also with recent R sided PTX, which has recurred , and is being followed by outpt pulm with freq xr, recent CT. No trauma, no LOC, no falls.        In the ED VS: WNL. Labs WNL. CXR: no acute changes. CT head: old b/l frontal subdural hematomas slightly decreased in size, mild chronic ischemic changes in frontoparietal white matter. CT neck: Abnormal density in the ventral epidural space extending from C2 through C3-C4 measuring up to 5 m in thickness. foraminal narrowing C5-C6, C6-C7EKG NSR 78. Given tylenol 650mg x 1 for headache. (13 Mar 2019 00:45)    Neurology was consulted            ---    HOSPITAL COURSE:     Pt was admitted to [facility] for r/o TIA. Pt was started on atorvastatin 40mg, but not ASA as pt states she is unable to take this medication MRI: showed multiple patchy confluent nonspecific white matter foci of t2/FLAIR favoring microvascular dz, no acute infarct. TTE was not obtained as pt did not want to go through test as she reported she just got one done recently and it was normal. lipid panel was wnl so the statin was dc. duplex carotids revealed: no evidence of stenosis. Pt reported that in the past she was worked up for GBS and was told she did not have that, but that it might still be CIDP. Pt reported she had MRA done in past which was normal and EMG done in past which showed neuropathy in her b/l LE but not upper extremities. Given CT findings pt would have benefited from MRI with IV contrast, but  IV contrast allergy causing anaphylaxis in past so this was not obtained. Pt did not want any further imaging done on her neck for her cervical stenosis. Neurology evaluated the patient and *****. Pt was deemed medically stable for dc.         ---    CONSULTANTS:     Neuro: Veda    ---    TIME SPENT:    The total amount of time spent reviewing the hospital notes, laboratory values, imaging findings, assessing/counseling the patient, discussing with consultant physicians, social work, nursing staff took >60minutes        ---    FINAL DISCHARGE DIAGNOSIS LIST:    Please see last daily progress note for final discharge diagnoses        ---    Primary care provider was made aware of plan for discharge:      [  ] NO     [  ] YES FROM ADMISSION H+P:     HPI:    59F with PMHx of MVP, chronic subdural hematoma, interstitial lung disease( on home o2 PRN), non-hodgkin's lymphoma, tachycardia( on diltiazem), meniere's disease, neuropathy presents after sudden onset of b/l UE weakness. Patient was on the phone with her daughter and had sudden onset of b/l upper extremity weakness which caused her to drop the phone. Had just finished PT exercises prior to episode.  Patient states that she could not  anything or move her arms.  who witnessed event states that patient had a look of panic on her face, whole body was limp, no slurring of words noted. Patient states that numbness lasted approximately 15 minutes. Per patient this episode was accompanied by dizziness and blurred vision. Patient admits to headache, photosensitivity photosensitivity, nausea, difficulty concentrating. Patient noted to have recent chronic headaches which she is following with neurology for as outpatient but states that her current headache is worse than normal.  Patient denies new CP, SOB, abdominal pain, diarrhea. Of note patient states that a few months ago she had a right sided catheterization to evaluate for pulmonary HTN and has had arm numbness and pain on and off since.  Pt also with recent R sided PTX, which has recurred , and is being followed by outpt pulm with freq xr, recent CT. No trauma, no LOC, no falls.        In the ED VS: WNL. Labs WNL. CXR: no acute changes. CT head: old b/l frontal subdural hematomas slightly decreased in size, mild chronic ischemic changes in frontoparietal white matter. CT neck: Abnormal density in the ventral epidural space extending from C2 through C3-C4 measuring up to 5 m in thickness. foraminal narrowing C5-C6, C6-C7EKG NSR 78. Given tylenol 650mg x 1 for headache. (13 Mar 2019 00:45)    Neurology was consulted            ---    HOSPITAL COURSE:     Pt was admitted to [facility] for r/o TIA. Pt was started on atorvastatin 40mg, but not ASA as pt states she is unable to take this medication MRI: showed multiple patchy confluent nonspecific white matter foci of t2/FLAIR favoring microvascular dz, no acute infarct. TTE was not obtained as pt did not want to go through test as she reported she just got one done recently and it was normal. lipid panel was wnl so the statin was dc. duplex carotids revealed: no evidence of stenosis. Pt reported that in the past she was worked up for GBS and was told she did not have that, but that it might still be CIDP. Pt reported she had MRA done in past which was normal and EMG done in past which showed neuropathy in her b/l LE but not upper extremities. Given CT findings pt would have benefited from MRI with IV contrast, but  IV contrast allergy causing anaphylaxis in past so this was not obtained. Pt did not want any further imaging done on her neck for her cervical stenosis. Neurology evaluated the patient and determined there was no acute concern for TIA/CVA and to obtain further management as outpatient. Neurology also spoke with pt's outpatient neurologist who reported pt has had an extensive worked up in the past. Symptoms were completely resolved. Pt was deemed medically stable for dc.         ---    CONSULTANTS:     Neuro: Veda    ---    TIME SPENT:    The total amount of time spent reviewing the hospital notes, laboratory values, imaging findings, assessing/counseling the patient, discussing with consultant physicians, social work, nursing staff took >60minutes        ---    FINAL DISCHARGE DIAGNOSIS LIST:    Please see last daily progress note for final discharge diagnoses        ---    Primary care provider was made aware of plan for discharge:      [  ] NO     [  ] YES        On day of dc:    GENERAL: patient appears well, no acute distress, resting comfortably in bed    EYES: sclera clear, no exudates, PERRL    ENMT: oropharynx clear without erythema, no exudates, moist mucous membranes, no stridor    NECK: supple, soft, no thyromegaly noted    LUNGS: good air entry bilaterally, clear to auscultation, symmetric breath sounds, no wheezing or rhonchi appreciated    HEART: soft S1/S2, regular rate and rhythm, systolic murmur w/ midsystolic click    GASTROINTESTINAL: abdomen is soft, nontender, nondistended, normoactive bowel sounds, no palpable masses    INTEGUMENT: warm, dry, normal color    MUSCULOSKELETAL: no clubbing or cyanosis, no obvious deformity    NEUROLOGIC: awake, alert, oriented x3, good muscle tone in 4 extremities, no obvious sensory deficits, CN II-XII grossly intact, finger to nose normal, strength 4/5 throughout    PSYCHIATRIC: appears anxious

## 2019-03-13 NOTE — DISCHARGE NOTE PROVIDER - CARE PROVIDER_API CALL
Last, Joselito ARANA (DO)  Internal Medicine  [address] [place] [address]  Phone: [phone]  Fax: [phone]  Follow Up Time: 1 week    Krystian Kulkarni  [address], [address]  [place] [address]  Phone: [phone]  Fax: (   )    -  Follow Up Time:     Srinivas Lanza)  Thoracic Surgery  [id number] [place] [address]  Phone: [phone]  Fax: [phone]  Follow Up Time:     Bettye Villatoro  Phone: [phone]  Fax: (   )    -  Follow Up Time:

## 2019-03-13 NOTE — PROGRESS NOTE ADULT - ASSESSMENT
59F with PMHx of MVP, chronic subdural hematoma, interstitial lung disease( on home o2 PRN), non-hodgkin's lymphoma, tachycardia( on diltiazem), meniere's disease, neuropathy presents after sudden onset of b/l UE weakness. Admitted for upper extremity weakness, TIA workup.     1. B/L Upper extremity weakness. now resolved, r/o TIA vs cervical radiculopathy. Denies any recent trauma.  CT head negative for acute changes, old resolving subdural hematoma. CT neck with area of increased density from C2 through C3-C4, foraminal narrowing C5-C6, C6-C7  pt would benefit from MRI with IV contrast, patient with IV contrast allergy causing anaphylaxis in past   will fu MRI, TTE and carotid dopplers, fu lipid panel   neuro checks q4  tylenol prn for headache  zofran 4mg IV q6 PRN for nausea  patient states aspirin caused her immense chest pain and had bad reaction to it in the past, will not start at this time  start atorvastatin 40  neuro Dr. Mccollum consulted, appreciate recs  fall precautions    2. Pneumothorax on right   CT showing small apical pneumo. chronic follows outpatient with Dr. Hazel for pulmonology, Dr. Lanza cardiothoracic surgery     3. Interstitial Lung disease. not on any medications at home. pt reports she does not like taking meds.  on home o2 prn   monitor saturations, supplemental o2 PRN to keep Sp)2 >88%    4. Episodic Tachycardia  patient on diltiazem at home. Confirmed with pharmacy and patient. Pt takes cardizem 120mg daily   will resume cardizem with hold parameters    6. Tension Headache  Chronic. Cont Tylenol PRN    7. Neuropathy  pt reports she had EMG done in past which showed neuropathy in her b/l LE but not upper extremities  She was prescribed gabapentin and never took it. not currently on meds. She does not taking any medications    8. Meniere Disease  Chronic. Not on any home medications    9. Hx of Subdural hematoma  Old. Resolving.     10. Need for prophylactic measure  Padua 0, SCDS 59F with PMHx of MVP, chronic subdural hematoma, interstitial lung disease( on home o2 PRN), non-hodgkin's lymphoma, tachycardia( on diltiazem), meniere's disease, neuropathy presents after sudden onset of b/l UE weakness. Admitted for upper extremity weakness, TIA workup.     1. B/L Upper extremity weakness. now resolved, r/o TIA vs cervical radiculopathy vs MS vs CIDP. Denies any recent trauma.  CT head negative for acute changes, old resolving subdural hematoma. CT neck with area of increased density from C2 through C3-C4, foraminal narrowing C5-C6, C6-C7  pt would benefit from MRI with IV contrast, patient with IV contrast allergy causing anaphylaxis in past   will fu MRI, TTE and carotid dopplers, fu lipid panel   neuro checks q4  tylenol prn for headache  zofran 4mg IV q6 PRN for nausea  patient states aspirin caused her immense chest pain and had bad reaction to it in the past, will not start at this time  start atorvastatin 40  will discuss with neuro to see if MRI neck would be beneficial given foraminal narrowing found on CT  neuro Dr. Mccollum consulted, appreciate recs  fall precautions    2. Tension Headache  gets pulsatile tinnitus, early morning HAs that are not improved throughout day.  will fu neuro rec on whether LP is warranted for opening pressure to r/o increased ICP  Cont Tylenol PRN    3. Pneumothorax on right   CT showing small apical pneumo. chronic follows outpatient with Dr. Hazel for pulmonology, Dr. Lanza cardiothoracic surgery     4. Interstitial Lung disease. not on any medications at home. pt reports she does not like taking meds.  on home o2 prn   monitor saturations, supplemental o2 PRN to keep Sp)2 >88%    5. Episodic Tachycardia  patient on diltiazem at home. Confirmed with pharmacy and patient. Pt takes cardizem 120mg daily   will resume cardizem with hold parameters    6. Neuropathy  pt reports she had EMG done in past which showed neuropathy in her b/l LE but not upper extremities  She was prescribed gabapentin and never took it. not currently on meds. She does not taking any medications    7. Meniere Disease  Chronic. Not on any home medications    8. Hx of Subdural hematoma  Old. Resolving. pt states it was spontaneous in nature with no inciting event     9. Need for prophylactic measure  Padua 0, SCDS 59F with PMHx of MVP, chronic subdural hematoma, interstitial lung disease( on home o2 PRN), non-hodgkin's lymphoma, tachycardia( on diltiazem), meniere's disease, neuropathy presents after sudden onset of b/l UE weakness. Admitted for upper extremity weakness, TIA workup.     1. B/L Upper extremity weakness. now resolved, r/o TIA vs cervical radiculopathy vs MS vs CIDP. Denies any recent trauma.  CT head negative for acute changes, old resolving subdural hematoma. CT neck with area of increased density from C2 through C3-C4, foraminal narrowing C5-C6, C6-C7  pt would benefit from MRI with IV contrast, patient with IV contrast allergy causing anaphylaxis in past   will fu MRI, TTE and carotid dopplers, fu lipid panel   neuro checks q4  tylenol prn for headache  zofran 4mg IV q6 PRN for nausea  patient states aspirin caused her immense chest pain and had bad reaction to it in the past, will not start at this time  start atorvastatin 40  will discuss with neuro to see if MRI neck would be beneficial given foraminal narrowing found on CT  neuro Dr. Mccollum consulted, appreciate recs  fall precautions    2. Tension Headache  gets pulsatile tinnitus, early morning HAs that are not improved throughout day.  will fu neuro rec on whether LP is warranted for opening pressure to r/o increased ICP  Cont Tylenol PRN    3. Pneumothorax on right   CT showing small apical pneumo. stable. follows outpatient with Dr. Hazel for pulmonology, Dr. Lanza cardiothoracic surgery     4. Interstitial Lung disease. not on any medications at home. pt reports she does not like taking meds.  on home o2 prn. stable.  monitor saturations, supplemental o2 PRN to keep Sp)2 >88%    5. Episodic Tachycardia  patient report she is on diltiazem at home. Confirmed with pharmacy and patient. Pt takes cardizem 120mg daily   will resume cardizem with hold parameters    6. Neuropathy  pt reports she had EMG done in past which showed neuropathy in her b/l LE but not upper extremities  She was prescribed gabapentin and never took it. not currently on meds. She does not taking any medications    7. Meniere Disease  Chronic. Not on any home medications    8. Hx of Subdural hematoma  Old. Resolving. pt states it was spontaneous in nature with no inciting event     9. Need for prophylactic measure  Padua 0, SCDS 59F with PMHx of MVP, chronic subdural hematoma, interstitial lung disease( on home o2 PRN), non-hodgkin's lymphoma, tachycardia( on diltiazem), meniere's disease, neuropathy presents after sudden onset of b/l UE weakness. Admitted for upper extremity weakness, TIA workup.     1. B/L Upper extremity weakness. now resolved, r/o TIA vs cervical radiculopathy vs MS vs CIDP. Denies any recent trauma.  CT head negative for acute changes, old resolving subdural hematoma. CT neck with area of increased density from C2 through C3-C4, foraminal narrowing C5-C6, C6-C7  pt would benefit from MRI with IV contrast, patient with IV contrast allergy causing anaphylaxis in past   will fu MRI, TTE and carotid dopplers, fu lipid panel   neuro checks q4  tylenol prn for headache  zofran 4mg IV q6 PRN for nausea  patient states aspirin caused her immense chest pain and had bad reaction to it in the past, will not start at this time  start atorvastatin 40 will dc if lipid panel wnl  will discuss with neuro to see if MRI neck would be beneficial given foraminal narrowing found on CT  neuro Dr. Mccollum consulted, appreciate recs  fall precautions    2. Tension Headache  gets pulsatile tinnitus, early morning HAs that are not improved throughout day.  will fu neuro rec on whether LP is warranted for opening pressure to r/o increased ICP  Cont Tylenol PRN    3. Pneumothorax on right   CT showing small apical pneumo. stable. follows outpatient with Dr. Hazel for pulmonology, Dr. Lanza cardiothoracic surgery     4. Interstitial Lung disease. not on any medications at home. pt reports she does not like taking meds.  on home o2 prn. stable.  monitor saturations, supplemental o2 PRN to keep Sp)2 >88%    5. Episodic Tachycardia  patient report she is on diltiazem at home. Confirmed with pharmacy and patient. Pt takes cardizem 120mg daily   will resume cardizem with hold parameters    6. Neuropathy  pt reports she had EMG done in past which showed neuropathy in her b/l LE but not upper extremities  She was prescribed gabapentin and never took it. not currently on meds. She does not taking any medications    7. Meniere Disease  Chronic. Not on any home medications    8. Hx of Subdural hematoma  Old. Resolving. pt states it was spontaneous in nature with no inciting event     9. Need for prophylactic measure  Padua 0, SCDS 59F with PMHx of MVP, chronic subdural hematoma, interstitial lung disease( on home o2 PRN), non-hodgkin's lymphoma, tachycardia( on diltiazem), meniere's disease, neuropathy presents after sudden onset of b/l UE weakness. Admitted for upper extremity weakness, TIA workup.     1. B/L Upper extremity weakness. now resolved, r/o TIA vs cervical radiculopathy vs MS vs CIDP. Denies any recent trauma.  CT head negative for acute changes, old resolving subdural hematoma. CT neck with area of increased density from C2 through C3-C4, foraminal narrowing C5-C6, C6-C7  will fu MRI, carotid dopplers, fu lipid panel   Pt had recent echo done, refused repeat, will try to get new echo  neuro checks q4  tylenol prn for headache  zofran 4mg IV q6 PRN for nausea  patient states aspirin caused her immense chest pain and had bad reaction to it in the past, will not start at this time  start atorvastatin 40 will dc if lipid panel wnl  will discuss with neuro to see if MRI neck would be beneficial given foraminal narrowing found on CT  neuro Dr. Mccollum consulted, appreciate recs  fall precautions    2. Tension Headache  gets pulsatile tinnitus, early morning HAs that are not improved throughout day.  will fu neuro rec on whether LP is warranted for opening pressure to r/o increased ICP  Cont Tylenol PRN    3. Pneumothorax on right   CT showing small apical pneumo. stable. follows outpatient with Dr. Hazel for pulmonology, Dr. Lanza cardiothoracic surgery     4. Interstitial Lung disease. not on any medications at home. pt reports she does not like taking meds.  on home o2 prn. stable.  monitor saturations, supplemental o2 PRN to keep Sp)2 >88%    5. Episodic Tachycardia  patient report she is on diltiazem at home. Confirmed with pharmacy and patient. Pt takes cardizem 120mg daily   will resume cardizem with hold parameters    6. Neuropathy  pt reports she had EMG done in past which showed neuropathy in her b/l LE but not upper extremities  She was prescribed gabapentin and never took it. not currently on meds. She does not taking any medications    7. Meniere Disease  Chronic. Not on any home medications    8. Hx of Subdural hematoma  Old. Resolving. pt states it was spontaneous in nature with no inciting event     9. Need for prophylactic measure  Padua 0, SCDS

## 2019-03-13 NOTE — H&P ADULT - NSHPSOCIALHISTORY_GEN_ALL_CORE
lives with  and children   denies smoking   denies etoh   denies substance use  previously walked with cane, now able to walk with minimal assistance

## 2019-03-13 NOTE — H&P ADULT - ASSESSMENT
58F with PMHx of MVP, chronic subdural hematoma, interstitial lung disease( on home o2 PRN), non-hodgkin's lymphoma, tachycardia( on diltiazem), meniere's disease, neuropathy presents after sudden onset of b/l UE weakness. Admitted for upper extremity weakness, TIA workup.     Upper extremity weakness, b/l  2/2 TIA vs neck mass vs cervical radiculopoathy  now resolved  CT head negative for acute changes  CT neck with area of increased density from C2 through C3-C4  would benefit from MRI with IV contrast, patient with IV contrast allergy causing anaphylaxis in past   MRI non con in AM   carotid dopplers in AM   echo in AM   patient states aspirin caused her immense chest pain and had bad reaction to it in the past, will not start at this time  f/u lipid panel   neuro Dr. Mccollum consulted, appreciate recs    Pneumothorax on right   chronic  follows outpatient with Dr. Hazel for pulmonology, Dr. Lanza cardiothoracic surgery     Lung disease  on home o2 prn   monitor saturations, supplemental o2 PRN.     Tachycardia  patient on diltiazem at home  patient states she believes dose is 120mg daily   unable to contact pharmacy to verify dose, please contact [facility] in AM to complete medication reconciliation   will hold at this time until dose can be verified     Need for prophylactic measure  Padua 0, SCDS 58F with PMHx of MVP, chronic subdural hematoma, interstitial lung disease( on home o2 PRN), non-hodgkin's lymphoma, tachycardia( on diltiazem), meniere's disease, neuropathy presents after sudden onset of b/l UE weakness. Admitted for upper extremity weakness, TIA workup.     Upper extremity weakness, b/l  2/2 TIA vs neck mass vs cervical radiculopoathy  now resolved  CT head negative for acute changes  CT neck with area of increased density from C2 through C3-C4  would benefit from MRI with IV contrast, patient with IV contrast allergy causing anaphylaxis in past   MRI non con in AM   carotid dopplers in AM   echo in AM   patient states aspirin caused her immense chest pain and had bad reaction to it in the past, will not start at this time  f/u lipid panel   neuro Dr. Mccollum consulted, appreciate recs    Pneumothorax on right   chronic  follows outpatient with Dr. Hazel for pulmonology, Dr. Lanza cardiothoracic surgery     Lung disease  on home o2 prn   monitor saturations, supplemental o2 PRN.     Tachycardia  patient on diltiazem at home  patient states she believes dose is 120mg daily   unable to contact pharmacy to verify dose, please contact [facility] pharmacy in AM to complete medication reconciliation   will hold at this time until dose can be verified     Medication reconcilliation   unable to verify patients medications with her pharmacy as they are currently closed  patient states only medication is diltiazem, vitamin d and vitamin b12  please contact [facility] pharmacy when it opens in the AM to complete medication reconciliation     Need for prophylactic measure  Padua 0, SCDS 58F with PMHx of MVP, chronic subdural hematoma, interstitial lung disease( on home o2 PRN), non-hodgkin's lymphoma, tachycardia( on diltiazem), meniere's disease, neuropathy presents after sudden onset of b/l UE weakness. Admitted for upper extremity weakness, TIA workup.     Upper extremity weakness, b/l  2/2 TIA vs neck mass vs cervical radiculopoathy  now resolved  CT head negative for acute changes  CT neck with area of increased density from C2 through C3-C4  would benefit from MRI with IV contrast, patient with IV contrast allergy causing anaphylaxis in past   MRI non con in AM   carotid dopplers in AM   echo in AM   neuro checks q4  tylenol prn for headache  zofran 4mg IV q6 PRN for nausea  patient states aspirin caused her immense chest pain and had bad reaction to it in the past, will not start at this time  f/u lipid panel   neuro Dr. Mccollum consulted, appreciate recs    Pneumothorax on right   chronic  follows outpatient with Dr. Hazel for pulmonology, Dr. Lanza cardiothoracic surgery     Lung disease  on home o2 prn   monitor saturations, supplemental o2 PRN.     Tachycardia  patient on diltiazem at home  patient states she believes dose is 120mg daily   unable to contact pharmacy to verify dose, please contact [facility] pharmacy in AM to complete medication reconciliation   will hold at this time until dose can be verified     Medication reconcilliation   unable to verify patients medications with her pharmacy as they are currently closed  patient states only medication is diltiazem, vitamin d and vitamin b12  please contact [facility] pharmacy when it opens in the AM to complete medication reconciliation     Need for prophylactic measure  Padua 0, SCDS 59F with PMHx of MVP, chronic subdural hematoma, interstitial lung disease( on home o2 PRN), non-hodgkin's lymphoma, tachycardia( on diltiazem), meniere's disease, neuropathy presents after sudden onset of b/l UE weakness. Admitted for upper extremity weakness, TIA workup.     1. Upper extremity weakness, b/l  2/2 TIA vs neck mass vs cervical radiculopoathy  now resolved  CT head negative for acute changes  CT neck with area of increased density from C2 through C3-C4  would benefit from MRI with IV contrast, patient with IV contrast allergy causing anaphylaxis in past   MRI non con in AM   carotid dopplers in AM   echo in AM   neuro checks q4  tylenol prn for headache  zofran 4mg IV q6 PRN for nausea  patient states aspirin caused her immense chest pain and had bad reaction to it in the past, will not start at this time  f/u lipid panel   neuro Dr. Mccollum consulted, appreciate recs    2. Pneumothorax on right   chronic  follows outpatient with Dr. Hazel for pulmonology, Dr. Lanza cardiothoracic surgery     3. Lung disease  on home o2 prn   monitor saturations, supplemental o2 PRN.     4. Tachycardia  patient on diltiazem at home  patient states she believes dose is 120mg daily   unable to contact pharmacy to verify dose, please contact [facility] pharmacy in AM to complete medication reconciliation   will hold at this time until dose can be verified     5. Medication reconciliation   unable to verify patients medications with her pharmacy as they are currently closed  patient states only medication is diltiazem, vitamin d and vitamin b12  please contact [facility] pharmacy when it opens in the AM to complete medication reconciliation     6. Need for prophylactic measure  Padua 0, SCDS

## 2019-03-13 NOTE — H&P ADULT - NSICDXPASTMEDICALHX_GEN_ALL_CORE_FT
PAST MEDICAL HISTORY:  Interstitial lung disease on home o2 prn    Mitral prolapse     NHL (non-Hodgkin's lymphoma) Agem 43 sp chemo/rt/stem cell    Transient cerebral ischemia, unspecified type

## 2019-03-14 ENCOUNTER — TRANSCRIPTION ENCOUNTER (OUTPATIENT)
Age: 59
End: 2019-03-14

## 2019-03-14 VITALS
RESPIRATION RATE: 17 BRPM | DIASTOLIC BLOOD PRESSURE: 74 MMHG | HEART RATE: 74 BPM | SYSTOLIC BLOOD PRESSURE: 112 MMHG | TEMPERATURE: 98 F

## 2019-03-14 LAB
ANION GAP SERPL CALC-SCNC: 6 MMOL/L — SIGNIFICANT CHANGE UP (ref 5–17)
APTT BLD: 33.9 SEC — SIGNIFICANT CHANGE UP (ref 28.5–37)
BUN SERPL-MCNC: 17 MG/DL — SIGNIFICANT CHANGE UP (ref 7–23)
CALCIUM SERPL-MCNC: 8.9 MG/DL — SIGNIFICANT CHANGE UP (ref 8.5–10.1)
CHLORIDE SERPL-SCNC: 107 MMOL/L — SIGNIFICANT CHANGE UP (ref 96–108)
CO2 SERPL-SCNC: 30 MMOL/L — SIGNIFICANT CHANGE UP (ref 22–31)
CREAT SERPL-MCNC: 0.66 MG/DL — SIGNIFICANT CHANGE UP (ref 0.5–1.3)
GLUCOSE SERPL-MCNC: 81 MG/DL — SIGNIFICANT CHANGE UP (ref 70–99)
HCT VFR BLD CALC: 39.6 % — SIGNIFICANT CHANGE UP (ref 34.5–45)
HGB BLD-MCNC: 12.8 G/DL — SIGNIFICANT CHANGE UP (ref 11.5–15.5)
INR BLD: 1.01 RATIO — SIGNIFICANT CHANGE UP (ref 0.88–1.16)
MCHC RBC-ENTMCNC: 31 PG — SIGNIFICANT CHANGE UP (ref 27–34)
MCHC RBC-ENTMCNC: 32.3 GM/DL — SIGNIFICANT CHANGE UP (ref 32–36)
MCV RBC AUTO: 95.9 FL — SIGNIFICANT CHANGE UP (ref 80–100)
NRBC # BLD: 0 /100 WBCS — SIGNIFICANT CHANGE UP (ref 0–0)
PLATELET # BLD AUTO: 301 K/UL — SIGNIFICANT CHANGE UP (ref 150–400)
POTASSIUM SERPL-MCNC: 5 MMOL/L — SIGNIFICANT CHANGE UP (ref 3.5–5.3)
POTASSIUM SERPL-SCNC: 5 MMOL/L — SIGNIFICANT CHANGE UP (ref 3.5–5.3)
PROTHROM AB SERPL-ACNC: 11.5 SEC — SIGNIFICANT CHANGE UP (ref 10–12.9)
RBC # BLD: 4.13 M/UL — SIGNIFICANT CHANGE UP (ref 3.8–5.2)
RBC # FLD: 13.8 % — SIGNIFICANT CHANGE UP (ref 10.3–14.5)
SODIUM SERPL-SCNC: 143 MMOL/L — SIGNIFICANT CHANGE UP (ref 135–145)
WBC # BLD: 4.88 K/UL — SIGNIFICANT CHANGE UP (ref 3.8–10.5)
WBC # FLD AUTO: 4.88 K/UL — SIGNIFICANT CHANGE UP (ref 3.8–10.5)

## 2019-03-14 PROCEDURE — 85610 PROTHROMBIN TIME: CPT

## 2019-03-14 PROCEDURE — 72125 CT NECK SPINE W/O DYE: CPT

## 2019-03-14 PROCEDURE — 99239 HOSP IP/OBS DSCHRG MGMT >30: CPT

## 2019-03-14 PROCEDURE — 71045 X-RAY EXAM CHEST 1 VIEW: CPT

## 2019-03-14 PROCEDURE — 36415 COLL VENOUS BLD VENIPUNCTURE: CPT

## 2019-03-14 PROCEDURE — 99285 EMERGENCY DEPT VISIT HI MDM: CPT | Mod: 25

## 2019-03-14 PROCEDURE — 84100 ASSAY OF PHOSPHORUS: CPT

## 2019-03-14 PROCEDURE — 80048 BASIC METABOLIC PNL TOTAL CA: CPT

## 2019-03-14 PROCEDURE — 80053 COMPREHEN METABOLIC PANEL: CPT

## 2019-03-14 PROCEDURE — 93005 ELECTROCARDIOGRAM TRACING: CPT

## 2019-03-14 PROCEDURE — 86803 HEPATITIS C AB TEST: CPT

## 2019-03-14 PROCEDURE — 85730 THROMBOPLASTIN TIME PARTIAL: CPT

## 2019-03-14 PROCEDURE — 80061 LIPID PANEL: CPT

## 2019-03-14 PROCEDURE — 93880 EXTRACRANIAL BILAT STUDY: CPT

## 2019-03-14 PROCEDURE — 85027 COMPLETE CBC AUTOMATED: CPT

## 2019-03-14 PROCEDURE — 83735 ASSAY OF MAGNESIUM: CPT

## 2019-03-14 PROCEDURE — 70450 CT HEAD/BRAIN W/O DYE: CPT

## 2019-03-14 PROCEDURE — 70551 MRI BRAIN STEM W/O DYE: CPT

## 2019-03-14 PROCEDURE — 97162 PT EVAL MOD COMPLEX 30 MIN: CPT

## 2019-03-14 RX ADMIN — Medication 120 MILLIGRAM(S): at 05:36

## 2019-03-14 NOTE — PROGRESS NOTE ADULT - SUBJECTIVE AND OBJECTIVE BOX
Neurology follow up note    ROMIE [id number]      Interval History:    Patient feels ok no new complaints doing better     MEDICATIONS    acetaminophen   Tablet .. 650 milliGRAM(s) Oral every 6 hours PRN  diltiazem    milliGRAM(s) Oral daily  ondansetron Injectable 4 milliGRAM(s) IV Push every 6 hours PRN      Allergies    aspirin (Other (Unknown))  IV Contrast (Anaphylaxis)  shellfish. (Anaphylaxis)    Intolerances            Vital Signs Last 24 Hrs  T(C): 36.7 (14 Mar 2019 11:29), Max: 36.8 (13 Mar 2019 16:08)  T(F): 98 (14 Mar 2019 11:29), Max: 98.3 (13 Mar 2019 16:08)  HR: 74 (14 Mar 2019 11:29) (66 - 85)  BP: 112/74 (14 Mar 2019 11:29) (99/67 - 112/74)  BP(mean): --  RR: 17 (14 Mar 2019 11:29) (16 - 17)  SpO2: 96% (14 Mar 2019 09:15) (95% - 97%)    REVIEW OF SYSTEM:    Constitutional:  At present, no fever, chills, or night sweats.    Head:  no lightheadedness.    Eyes:  No double vision or blurry vision.    Ears:  No ringing in the ears.    Neck:  Positive history of neck pain for which she has undergone MRI imaging before, possibly had cervical disc disease.    Respiratory:  Positive history of shortness of breath from her interstitial lung disease.    Cardiovascular:  Positive history of tachycardia and intermittent chest pain from her interstitial lung disease.    Abdomen:  No nausea, vomiting, or abdominal pain.    Extremities/Neurological:  no new changes.    Genitourinary:  No burning upon urination.      PHYSICAL EXAMINATION:     HEENT:  Head:  Normocephalic, atraumatic.    Eyes:  No scleral icterus.    Ears:  Hearing bilaterally was intact.    NECK:  Supple.    RESPIRATORY:  Decreased breath sounds bilaterally.    CARDIOVASCULAR:  S1 and S2 heard.    ABDOMEN:  Soft and nontender.    EXTREMITIES:  No clubbing or cyanosis were noted.      NEUROLOGIC:  The patient is awake and alert.    Extraocular movements were intact.  Pupils were equal, round, and reactive bilaterally 3 mm to 2 mm.    Speech was fluent.    Smile was symmetric.   Motor:  Bilateral upper and lower were 4+/5.    Sensory:  The patient has decreased pinprick on her left leg, but then states that when she has been tested multiple times in the past, that numbness will jump and decreased sensation will be on either side of her body.    Deep tendon reflexes bilateral upper and lower were +1.  No drift              LABS:  CBC Full  -  ( 14 Mar 2019 06:26 )  WBC Count : 4.88 K/uL  Hemoglobin : 12.8 g/dL  Hematocrit : 39.6 %  Platelet Count - Automated : 301 K/uL  Mean Cell Volume : 95.9 fl  Mean Cell Hemoglobin : 31.0 pg  Mean Cell Hemoglobin Concentration : 32.3 gm/dL  Auto Neutrophil # : x  Auto Lymphocyte # : x  Auto Monocyte # : x  Auto Eosinophil # : x  Auto Basophil # : x  Auto Neutrophil % : x  Auto Lymphocyte % : x  Auto Monocyte % : x  Auto Eosinophil % : x  Auto Basophil % : x      03-14    143  |  107  |  17  ----------------------------<  81  5.0   |  30  |  0.66    Ca    8.9      14 Mar 2019 06:26  Phos  3.5     03-13  Mg     2.3     03-13    TPro  7.5  /  Alb  3.4  /  TBili  0.3  /  DBili  x   /  AST  16  /  ALT  15  /  AlkPhos  110  03-12    Hemoglobin A1C:     LIVER FUNCTIONS - ( 12 Mar 2019 21:35 )  Alb: 3.4 g/dL / Pro: 7.5 g/dL / ALK PHOS: 110 U/L / ALT: 15 U/L / AST: 16 U/L / GGT: x           Vitamin B12   PT/INR - ( 14 Mar 2019 06:26 )   PT: 11.5 sec;   INR: 1.01 ratio         PTT - ( 14 Mar 2019 06:26 )  PTT:33.9 sec      RADIOLOGY    ANALYSIS AND PLAN:  This is a 59-year-old with a history of neuropathy, Meniere's, episode of lightheadedness, blurry vision, generalized weakness, generalized paresthesias.  1.	Clinical impression from the global symptoms of all four extremities be involved with lightheadedness with a history of tachycardia, possibly cerebral hypoperfusion.  As per the patient states that she does normally run low blood pressure.  When EMS showed up did say her blood pressure was low.  Doubt that this was a cerebrovascular accident secondary to the constellation of symptoms all four extremities.  This points towards a defuse cerebrum process.  Other differentials could be subclinical seizure events.  2.	I would recommend telemetry evaluation to monitor the patient's history of tachycardia, if necessary loop recorder as an outpatient.  3.	Monitor systolic blood pressure.  4.	For history of subdural hematoma, these appeared to be spontaneous, they appeared to be resolving  I would recommend supportive therapy.  5.	For history of neuropathy has had work in past  6.	Spoke with the patient about obtaining EEG.  She states that one point she did go for four-day EEG monitoring, which did not show anything.  She is refusing to have that done here with us, does not want to have it done.  7.	Spoke with the patient in regards to obtaining further imaging of her cervical spine with contrast material; gadolinium, not the IV contrast used for CAT scan.  The patient refuses at present to go for any type of further imaging.  8.	I would recommend Physical Therapy evaluation.  9.	I would recommend fall precaution.  10.	The patient was seen and examined with the daughter at the bedside, explained to her what workup I had recommended.  She understands and we will be respecting her mother's wishes and what she does not want to have done.  11.	A call was placed to the patient's outside neurologist, Jason Arreola, to obtain further information workup.  12.	From a neurological standpoint, the patient appears to be stable.  13.	overall patient is doing better   14.	PT noted 75 feet  15.	seen with daughter   16.	patient will follow up with her outside neurologist   17.	Greater than 45 minutes of time was spent with the patient, plan of care, reviewing data, speaking to the family and multidisciplinary healthcare team.  The patient stated that she did not want me to speak to her spouse, it was okay.    Neurologic standpoint only cleared for discharge planning     Thank you for the courtesy of this consultation.

## 2019-03-14 NOTE — PROGRESS NOTE ADULT - ASSESSMENT
59F with PMHx of MVP, chronic subdural hematoma, interstitial lung disease( on home o2 PRN), non-hodgkin's lymphoma, tachycardia( on diltiazem), meniere's disease, neuropathy presents after sudden onset of b/l UE weakness. Admitted for upper extremity weakness, TIA workup.     1. B/L Upper extremity weakness. now resolved, cervical radiculopathy vs MS vs CIDP vs other neurological/psychiatric condition. TIA unlikely given presenting symptoms and clinical course. Denies any recent trauma.  CT head negative for acute changes, old resolving subdural hematoma. CT neck with area of increased density from C2 through C3-C4, foraminal narrowing C5-C6, C6-C7  MRI reviewed and no acute evidence of infarct, carotid dopplers neg, Lipid panel wnl  Pt had echo done in past, refused repeat  pt refused further imaging of her neck.  neuro checks q4  tylenol prn for headache  zofran 4mg IV q6 PRN for nausea  neuro Dr. Mccollum consulted, appreciate recs  fall precautions  PT eval    2. Tension Headache  Improving. Now reports headache gets worse in afternoon  Cont Tylenol PRN    3. Pneumothorax on right   CT showing small apical pneumo. stable. follows outpatient with Dr. Hazel for pulmonology, Dr. Lanza cardiothoracic surgery     4. Interstitial Lung disease. not on any medications at home. pt reports she does not like taking meds.  on home o2 prn. stable.  monitor saturations, supplemental o2 PRN to keep Sp)2 >88%    5. Episodic Tachycardia  patient report she is on diltiazem at home. Confirmed with pharmacy and patient. Pt takes cardizem 120mg daily   will resume cardizem with hold parameters    6. Neuropathy  pt reports she had EMG done in past which showed neuropathy in her b/l LE but not upper extremities  She was prescribed gabapentin and never took it. not currently on meds. She does not taking any medications    7. Meniere Disease  Chronic. Not on any home medications    8. Hx of Subdural hematoma  Old. Resolving. pt states it was spontaneous in nature with no inciting event     9. Need for prophylactic measure  [place] 0, SCDS

## 2019-03-14 NOTE — PHYSICAL THERAPY INITIAL EVALUATION ADULT - ADDITIONAL COMMENTS
pt lives in a house w/ 3 steps/no rail to enter.  As per pt, family always available to assist her on steps.

## 2019-03-14 NOTE — DISCHARGE NOTE NURSING/CASE MANAGEMENT/SOCIAL WORK - NSDCDPATPORTLINK_GEN_ALL_CORE
You can access the [facility] Patient Portal, offered by [facility], by registering with the following website: [email or web address]

## 2019-03-14 NOTE — PHYSICAL THERAPY INITIAL EVALUATION ADULT - PERTINENT HX OF CURRENT PROBLEM, REHAB EVAL
59F presents after sudden onset of b/l UE weakness. Pt states that numbness lasted approximately 15 minutes. Along w/ dizziness and blurred vision. Patient admits to headache, photosensitivity photosensitivity, nausea, difficulty concentrating. Pt also with recent R sided PTX. CXR: no acute changes. CT head: old b/l frontal subdural hematomas slightly decreased in size. CT neck: Abnormal density C2,C3-C4. History of Non-Hodgkins disease, Meniere disease

## 2019-03-14 NOTE — PROGRESS NOTE ADULT - SUBJECTIVE AND OBJECTIVE BOX
Patient is a 59y old  Female who presents with a chief complaint of Arm weakness (13 Mar 2019 12:11)      FROM ADMISSION H+P:   HPI:  59F with PMHx of MVP, chronic subdural hematoma, interstitial lung disease( on home o2 PRN), non-hodgkin's lymphoma, tachycardia( on diltiazem), meniere's disease, neuropathy presents after sudden onset of b/l UE weakness. Patient was on the phone with her daughter and had sudden onset of b/l upper extremity weakness which caused her to drop the phone. Had just finished PT exercises prior to episode.  Patient states that she could not  anything or move her arms.  who witnessed event states that patient had a look of panic on her face, whole body was limp, no slurring of words noted. Patient states that numbness lasted approximately 15 minutes. Per patient this episode was accompanied by dizziness and blurred vision. Patient admits to headache, photosensitivity photosensitivity, nausea, difficulty concentrating. Patient noted to have recent chronic headaches which she is following with neurology for as outpatient but states that her current headache is worse than normal.  Patient denies new CP, SOB, abdominal pain, diarrhea. Of note patient states that a few months ago she had a right sided catheterization to evaluate for pulmonary HTN and has had arm numbness and pain on and off since.  Pt also with recent R sided PTX, which has recurred , and is being followed by outpt pulm with freq xr, recent CT. No trauma, no LOC, no falls.    In the ED VS: WNL. Labs WNL. CXR: no acute changes. CT head: old b/l frontal subdural hematomas slightly decreased in size, mild chronic ischemic changes in frontoparietal white matter. CT neck: Abnormal density in the ventral epidural space extending from C2 through C3-C4 measuring up to 5 m in thickness. EKG NSR 78. Given tylenol 650mg x 1 for headache. (13 Mar 2019 00:45)      ----  INTERVAL HPI/OVERNIGHT EVENTS: Pt seen and evaluated at the bedside. No acute overnight events occurred. Pt reports she is frustrated bc she has been in and out of the hospital 4x and gets dc without any answers or treatment. She reports her headache has improved. she has no further episodes of not being able to move, but feels she is uncoordinated still. Pt seems to refuse different testing options that are offered and states she has had this done before.      ----  PAST MEDICAL & SURGICAL HISTORY:  Interstitial lung disease: on home o2 prn  NHL (non-Hodgkin's lymphoma): Agem 43 sp chemo/rt/stem cell  Transient cerebral ischemia, unspecified type  Mitral prolapse  History of tonsillectomy  History of appendectomy      FAMILY HISTORY:  Family history of stroke  Family history of breast cancer: Mother      ----  MEDICATIONS  (STANDING):  diltiazem    milliGRAM(s) Oral daily    MEDICATIONS  (PRN):  acetaminophen   Tablet .. 650 milliGRAM(s) Oral every 6 hours PRN Mild Pain (1 - 3)  ondansetron Injectable 4 milliGRAM(s) IV Push every 6 hours PRN Nausea      ----  REVIEW OF SYSTEMS:  CONSTITUTIONAL: denies fever, chills, fatigue, weakness  HEENT: denies blurred vision, sore throat  SKIN: denies new lesions, rash  CARDIOVASCULAR: denies chest pain, chest pressure, palpitations  RESPIRATORY: denies shortness of breath, sputum production  GASTROINTESTINAL: denies nausea, vomiting, diarrhea, abdominal pain  GENITOURINARY: denies dysuria, discharge  NEUROLOGICAL: denies numbness, focal weakness, Endorses HA  MUSCULOSKELETAL: denies new joint pain, muscle aches  HEMATOLOGIC: denies gross bleeding, bruising  LYMPHATICS: denies enlarged lymph nodes, extremity swelling  PSYCHIATRIC: denies recent changes in anxiety, depression  ENDOCRINOLOGIC: denies sweating, cold or heat intolerance    ----  PHYSICAL EXAM:  GENERAL: patient appears well, no acute distress, resting comfortably in bed  EYES: sclera clear, no exudates, PERRL  ENMT: oropharynx clear without erythema, no exudates, moist mucous membranes, no stridor  NECK: supple, soft, no thyromegaly noted  LUNGS: good air entry bilaterally, clear to auscultation, symmetric breath sounds, no wheezing or rhonchi appreciated  HEART: soft S1/S2, regular rate and rhythm, systolic murmur w/ midsystolic click  GASTROINTESTINAL: abdomen is soft, nontender, nondistended, normoactive bowel sounds, no palpable masses  INTEGUMENT: warm, dry, normal color  MUSCULOSKELETAL: no clubbing or cyanosis, no obvious deformity  NEUROLOGIC: awake, alert, oriented x3, good muscle tone in 4 extremities, no obvious sensory deficits, CN II-XII grossly intact, finger to nose normal, strength 4/5 throughout  PSYCHIATRIC: appears anxious        T(C): 36.5 (03-14-19 @ 07:12), Max: 36.8 (03-13-19 @ 16:08)  HR: 75 (03-14-19 @ 07:12) (66 - 93)  BP: 101/67 (03-14-19 @ 07:12) (99/67 - 125/79)  RR: 17 (03-14-19 @ 07:12) (16 - 17)  SpO2: 97% (03-14-19 @ 07:12) (95% - 98%)  Wt(kg): --    ----  I&O's Summary      LABS:                        12.8   4.88  )-----------( 301      ( 14 Mar 2019 06:26 )             39.6     03-14    143  |  107  |  17  ----------------------------<  81  5.0   |  30  |  0.66    Ca    8.9      14 Mar 2019 06:26  Phos  3.5     03-13  Mg     2.3     03-13    TPro  7.5  /  Alb  3.4  /  TBili  0.3  /  DBili  x   /  AST  16  /  ALT  15  /  AlkPhos  110  03-12    PT/INR - ( 14 Mar 2019 06:26 )   PT: 11.5 sec;   INR: 1.01 ratio         PTT - ( 14 Mar 2019 06:26 )  PTT:33.9 sec    CAPILLARY BLOOD GLUCOSE                    ----  Personally reviewed:  Vital sign trends: [ x ] yes    [  ] no     [  ] n/a  Laboratory results: [ x ] yes    [  ] no     [  ] n/a  Radiology results: [ x ] yes    [  ] no     [  ] n/a  Culture results: [  ] yes    [  ] no     [  ] n/a  Consultant recommendations: [  ] yes    [  ] no     [  ] n/a

## 2019-03-14 NOTE — PHYSICAL THERAPY INITIAL EVALUATION ADULT - IMPAIRED TRANSFERS: SIT/STAND, REHAB EVAL
MD Notification    Notified Person: MD    Notified Person Name: Dr. Joshi    Notification Date/Time: 5/24/18 0020    Notification Interaction: Spoke with MD    Purpose of Notification: temp 103.4 after tylenol     Orders Received: Place ice packs on pt to aid in fever reduction    Comments: will continue to closely monitor      decreased strength/impaired balance/decreased flexibility

## 2019-04-19 ENCOUNTER — INPATIENT (INPATIENT)
Facility: HOSPITAL | Age: 59
LOS: 3 days | Discharge: ANOTHER HOSPITAL | DRG: 949 | End: 2019-04-23
Attending: PSYCHIATRY & NEUROLOGY | Admitting: PSYCHIATRY & NEUROLOGY
Payer: MEDICARE

## 2019-04-19 VITALS
WEIGHT: 124.78 LBS | HEART RATE: 100 BPM | SYSTOLIC BLOOD PRESSURE: 105 MMHG | HEIGHT: 69 IN | RESPIRATION RATE: 16 BRPM | OXYGEN SATURATION: 98 % | DIASTOLIC BLOOD PRESSURE: 73 MMHG | TEMPERATURE: 98 F

## 2019-04-19 DIAGNOSIS — Z90.49 ACQUIRED ABSENCE OF OTHER SPECIFIED PARTS OF DIGESTIVE TRACT: Chronic | ICD-10-CM

## 2019-04-19 DIAGNOSIS — Z90.89 ACQUIRED ABSENCE OF OTHER ORGANS: Chronic | ICD-10-CM

## 2019-04-19 DIAGNOSIS — I63.9 CEREBRAL INFARCTION, UNSPECIFIED: ICD-10-CM

## 2019-04-19 RX ORDER — DILTIAZEM HCL 120 MG
1 CAPSULE, EXT RELEASE 24 HR ORAL
Qty: 0 | Refills: 0 | COMMUNITY

## 2019-04-19 RX ORDER — CHOLECALCIFEROL (VITAMIN D3) 125 MCG
1 CAPSULE ORAL
Qty: 0 | Refills: 0 | COMMUNITY

## 2019-04-19 RX ORDER — POLYETHYLENE GLYCOL 3350 17 G/17G
17 POWDER, FOR SOLUTION ORAL
Qty: 0 | Refills: 0 | Status: DISCONTINUED | OUTPATIENT
Start: 2019-04-19 | End: 2019-04-19

## 2019-04-19 RX ORDER — ZINC OXIDE 200 MG/G
1 OINTMENT TOPICAL DAILY
Qty: 0 | Refills: 0 | Status: DISCONTINUED | OUTPATIENT
Start: 2019-04-19 | End: 2019-04-23

## 2019-04-19 RX ORDER — SIMETHICONE 80 MG/1
80 TABLET, CHEWABLE ORAL EVERY 6 HOURS
Qty: 0 | Refills: 0 | Status: DISCONTINUED | OUTPATIENT
Start: 2019-04-19 | End: 2019-04-23

## 2019-04-19 RX ORDER — LIDOCAINE 4 G/100G
1 CREAM TOPICAL DAILY
Qty: 0 | Refills: 0 | Status: DISCONTINUED | OUTPATIENT
Start: 2019-04-19 | End: 2019-04-23

## 2019-04-19 RX ORDER — TIOTROPIUM BROMIDE 18 UG/1
1 CAPSULE ORAL; RESPIRATORY (INHALATION) DAILY
Qty: 0 | Refills: 0 | Status: DISCONTINUED | OUTPATIENT
Start: 2019-04-19 | End: 2019-04-23

## 2019-04-19 RX ORDER — LANOLIN ALCOHOL/MO/W.PET/CERES
3 CREAM (GRAM) TOPICAL AT BEDTIME
Qty: 0 | Refills: 0 | Status: DISCONTINUED | OUTPATIENT
Start: 2019-04-19 | End: 2019-04-23

## 2019-04-19 RX ORDER — ASPIRIN/CALCIUM CARB/MAGNESIUM 324 MG
81 TABLET ORAL DAILY
Qty: 0 | Refills: 0 | Status: DISCONTINUED | OUTPATIENT
Start: 2019-04-19 | End: 2019-04-23

## 2019-04-19 RX ORDER — ACETAMINOPHEN 500 MG
650 TABLET ORAL EVERY 6 HOURS
Qty: 0 | Refills: 0 | Status: DISCONTINUED | OUTPATIENT
Start: 2019-04-19 | End: 2019-04-23

## 2019-04-19 RX ORDER — HEPARIN SODIUM 5000 [USP'U]/ML
5000 INJECTION INTRAVENOUS; SUBCUTANEOUS
Qty: 0 | Refills: 0 | Status: DISCONTINUED | OUTPATIENT
Start: 2019-04-19 | End: 2019-04-23

## 2019-04-19 RX ORDER — ENOXAPARIN SODIUM 100 MG/ML
40 INJECTION SUBCUTANEOUS EVERY 24 HOURS
Qty: 0 | Refills: 0 | Status: DISCONTINUED | OUTPATIENT
Start: 2019-04-19 | End: 2019-04-19

## 2019-04-19 RX ORDER — DIVALPROEX SODIUM 500 MG/1
500 TABLET, DELAYED RELEASE ORAL
Qty: 0 | Refills: 0 | Status: DISCONTINUED | OUTPATIENT
Start: 2019-04-19 | End: 2019-04-23

## 2019-04-19 RX ORDER — PREGABALIN 225 MG/1
1000 CAPSULE ORAL DAILY
Qty: 0 | Refills: 0 | Status: DISCONTINUED | OUTPATIENT
Start: 2019-04-19 | End: 2019-04-23

## 2019-04-19 RX ORDER — BUDESONIDE, MICRONIZED 100 %
0.25 POWDER (GRAM) MISCELLANEOUS
Qty: 0 | Refills: 0 | Status: DISCONTINUED | OUTPATIENT
Start: 2019-04-19 | End: 2019-04-23

## 2019-04-19 RX ORDER — PREGABALIN 225 MG/1
1 CAPSULE ORAL
Qty: 0 | Refills: 0 | COMMUNITY

## 2019-04-19 RX ORDER — LEVALBUTEROL 1.25 MG/.5ML
0.63 SOLUTION, CONCENTRATE RESPIRATORY (INHALATION) EVERY 8 HOURS
Qty: 0 | Refills: 0 | Status: DISCONTINUED | OUTPATIENT
Start: 2019-04-19 | End: 2019-04-23

## 2019-04-19 RX ORDER — CHOLECALCIFEROL (VITAMIN D3) 125 MCG
2000 CAPSULE ORAL DAILY
Qty: 0 | Refills: 0 | Status: DISCONTINUED | OUTPATIENT
Start: 2019-04-19 | End: 2019-04-23

## 2019-04-19 RX ORDER — LIDOCAINE 4 G/100G
1 CREAM TOPICAL DAILY
Qty: 0 | Refills: 0 | Status: DISCONTINUED | OUTPATIENT
Start: 2019-04-19 | End: 2019-04-19

## 2019-04-19 RX ADMIN — POLYETHYLENE GLYCOL 3350 17 GRAM(S): 17 POWDER, FOR SOLUTION ORAL at 18:46

## 2019-04-19 RX ADMIN — Medication 3 MILLIGRAM(S): at 23:56

## 2019-04-19 RX ADMIN — Medication 650 MILLIGRAM(S): at 23:56

## 2019-04-19 RX ADMIN — DIVALPROEX SODIUM 500 MILLIGRAM(S): 500 TABLET, DELAYED RELEASE ORAL at 18:46

## 2019-04-19 RX ADMIN — HEPARIN SODIUM 5000 UNIT(S): 5000 INJECTION INTRAVENOUS; SUBCUTANEOUS at 21:37

## 2019-04-19 NOTE — H&P ADULT - HISTORY OF PRESENT ILLNESS
60 yo female with PMHx of MVP, chronic SDH, interstitial lung disease/pneumothorax, meniere's, non hodgkins lymphoma (SCD/XRT/chemo at [facility] 2003), tachycardia and neuropathy, to [place] on 3/13/19 with acute onset of UE weakness associated with nausea and HA. CT head showed chronic B/L frontal SDH. The rest of the workup was negative. CVA/TIA r/out, however, course complicated by seizure/encephalopathy for which patient was transferred to [place] on 3/20/19. MRI brain showed cerebral ischemia. The rest of the workup was negative, this including CSF studies. EEG neg seizures. (Evaluated by lymphoma specialist/onco, PET 4/5/19 r/o recurrence of lymphoma. Increased signal at base of tongue to follow up outpt ENT). Additionally, course complicated with pneumothorax, pulmonary consulted, no interventions, self resolved. GI consulted for rectal pain; diagnosed c anal fissure/constipation, bowel regimen started. KUB 4/12/19 No stool, no free air, no distended loops of small bowel, fibroids. (refused further workup).   *TTE 3/18/19 EF 60%, mod-severe MR.   MRI brain 3/29/19 cortically based restricted diffusion within R>L frontoparietal region as well as additional scattered small foci, may represent evolution of a recent ischemia vs improving encephalitis. 60 yo female with PMHx of MVP, chronic SDH, interstitial lung disease/pneumothorax, pulmonary nodules, meniere's, non hodgkins lymphoma (SCD/XRT/chemo at [facility] 2003), TIA, tachycardia, occipital neuralgia, and neuropathy.  Patient was initially admitted to [place] on 3/13/19 with acute onset of b/l UE weakness associated with nausea, blurry vision, and HA. CT head showed chronic B/L frontal SDH. The rest of the workup was negative. CVA/TIA r/out.  Pt was discharged home but on 3/17 had apparent seizure.  She was admitted to [facility] and intubated and placed on depakote.  CTA H/N, MRI, EEG unremarkable.  ID Recommended LP for fever; family deferred.  Transferred to [facility] on 3/19/19.  MRI brain showed cerebral ischemia. The rest of the workup was negative, this including CSF studies. EEG neg seizures. (Evaluated by lymphoma specialist/onco, PET 4/5/19 r/o recurrence of lymphoma. Increased signal at base of tongue to follow up outpt ENT). Additionally, course complicated with pneumothorax, pulmonary consulted, no interventions, self resolved.  Tachycardia - per pulm, related related to chronic lung disease.  GI consulted for rectal pain; diagnosed c anal fissure/constipation, bowel regimen started. KUB 4/12/19 No stool, no free air, no distended loops of small bowel, fibroids. Refused further workup.   Other issues while admitted:  Hypotension - home diltiazem held per cardio  dizziness - sporadic, self resolving.  Pt deferred MRI.  May be 2/2 to hx of Meniere's disease.      *TTE 3/18/19 EF 60%, mod-severe MR.   MRI brain 3/29/19 cortically based restricted diffusion within R>L frontoparietal region as well as additional scattered small foci, may represent evolution of a recent ischemia vs improving encephalitis. 58 yo female with PMHx of MVP, chronic SDH, interstitial lung disease/pneumothorax, pulmonary nodules, meniere's, non hodgkins lymphoma (SCD/XRT/chemo at [facility] 2003), TIA, tachycardia, occipital neuralgia, and neuropathy.  Patient was initially admitted to [place] on 3/13/19 with acute onset of b/l UE weakness associated with nausea, blurry vision, and HA. CT head showed chronic B/L frontal SDH. The rest of the workup was negative. CVA/TIA r/out.  Pt was discharged home but on 3/17 had apparent seizure.  She was admitted to [facility] and intubated and placed on depakote.  CTA H/N, MRI, EEG unremarkable.  ID Recommended LP for fever; family deferred.  Transferred to [facility] on 3/19/19.  MRI brain showed cerebral ischemia. The rest of the workup was negative, this including CSF studies. EEG neg seizures. (Evaluated by lymphoma specialist/onco, PET 4/5/19 r/o recurrence of lymphoma. Increased signal at base of tongue to follow up outpt ENT). Additionally, course complicated with pneumothorax, pulmonary consulted, no interventions, self resolved.  Tachycardia - per pulm, related related to chronic lung disease.  GI consulted for rectal pain; diagnosed c anal fissure/constipation, bowel regimen started. KUB 4/12/19 No stool, no free air, no distended loops of small bowel, fibroids. Refused further workup.   Other issues while admitted:  Hypotension - home diltiazem held per cardio  dizziness - sporadic, self resolving.  Pt deferred MRI.  May be 2/2 to hx of Meniere's disease.      *TTE 3/18/19 EF 60%, mod-severe MR.   MRI brain 3/29/19 cortically based restricted diffusion within R>L frontoparietal region as well as additional scattered small foci, may represent evolution of a recent ischemia vs improving encephalitis.     Patient medically optimized and cleared for discharge to acute rehab at [facility] on 4/19/19.  Patient seen and examined upon arrival at [facility].  She had no new complaints at that time.

## 2019-04-19 NOTE — H&P ADULT - ATTENDING COMMENTS
Patient seen and examined on 4/20 AM.    Poor sleep as she is sleeping during the day.  Add Prep H for anal hemorrhoids.     Begin ACUTE inpatient rehabilitation for the functional deficits consisting of 3 hours of therapy/day & 24 hour RN/daily PMR physician for comorbid medical management. Patient will be able to tolerate 3 hours a day.

## 2019-04-19 NOTE — H&P ADULT - NSHPSOCIALHISTORY_GEN_ALL_CORE
, PHI, 3STE, bathroom downstairs. household ambulator with rollator. .  Lives with , daughter in private house.  3STE, bathroom downstairs. household ambulator.  Was doing home PT PTA

## 2019-04-19 NOTE — H&P ADULT - NSHPLABSRESULTS_GEN_ALL_CORE
PHYSICAL EXAM  VITALS  T97.9F    /73  RR 16  SpO2 98% on RA    Gen - NAD, Comfortable  HEENT - NCAT, EOMI, MMM  Neck - Supple, No limited ROM  Pulm - CTAB, No wheeze, No rhonchi, No crackles  Cardiovascular - RRR, S1S2, No murmurs  Abdomen - Soft, NT/ND, +BS  Extremities - No C/C/E, No calf tenderness  Neuro-     Cognitive - AAOx3     Communication - Fluent, No dysarthria     Attention: Intact      Judgement: Good evidence of judgement     Memory: Recall 3 objects immediate and 2/3 3 min later         Cranial Nerves - CN 2-12 intact     Motor -                          Sensory - Intact to LT     Reflexes - DTR Intact, No primitive reflexive     Coordination - FTN intact     Tone - normal  Psychiatric - Mood stable, Affect WNL  Skin:  all skin intact

## 2019-04-19 NOTE — H&P ADULT - NSHPPHYSICALEXAM_GEN_ALL_CORE
PHYSICAL EXAM  Constitutional - NAD, Comfortable  HEENT - NCAT, EOMI  Neck - Supple, No limited ROM  Chest - CTA bilaterally, No wheeze, No rhonchi, No crackles  Cardiovascular - RRR, S1S2, No murmurs  Abdomen - BS+, Soft, NTND  Extremities - No C/C/E, No calf tenderness   Skin-no rash  Woumds-      Neurologic Exam -                   HIF  - Awake, Alert, AAO to self, place, date, year, situation      No aphasia, normal attention, normal concentration, no apraxia, agnosia     Cranial Nerves - CN 2-12 intact     Motor - No focal deficits                            Sensory - Intact to LT,PP,Temprature,JPS, vibration                      Cortical sensory modalities intact     Reflexes - DTR Intact     Toes are flexor     Coordination/dysmetria - FTN intact             Balance - WNL Static and dynamic     Psychiatric - Mood stable, Affect WNL PHYSICAL EXAM  Constitutional - NAD, Comfortable  HEENT - NCAT, EOMI  Neck - Supple, No limited ROM  Chest - CTA bilaterally, No wheeze, No rhonchi, No crackles  Cardiovascular - RRR, S1S2, No murmurs  Abdomen - BS+, Soft, NTND  Extremities - No C/C/E, No calf tenderness   Skin-no rash  Wounds-      Neurologic Exam -                   HIF  - Awake, Alert, AAO to self, place, date, year, situation      No aphasia, normal attention, normal concentration, no apraxia, agnosia     Cranial Nerves - CN 2-12 intact     Motor - No focal deficits                            Sensory - Intact to LT,PP                           Reflexes - DTR Intact     Toes are flexor     Coordination/dysmetria - FTN intact             Balance - WNL Static and dynamic     Psychiatric - Mood stable, Affect WNL PHYSICAL EXAM  Constitutional - NAD, fatigued.  closes eyes frequently during exam  HEENT - NCAT, EOMI  Neck - Supple, No limited ROM  Chest - CTA bilaterally, No wheeze, No rhonchi, No crackles  Cardiovascular - RRR, S1S2, (+)MV murmur  Abdomen - BS+, Soft, NTND  Extremities - No C/C/E, No calf tenderness   Skin-contact dermatitis in gluteal crease  Wounds- none      Neurologic Exam -                   HIF  - Awake, Alert, AAO to self, place, date, year, situation      No aphasia, normal attention, normal concentration, no apraxia, agnosia     Cranial Nerves - CN 2-12 intact     Motor -                     LEFT    UE - exam limited 2/2 spasticity. MAS 3 in EF.  ShAd 1/5  EF 1/5 EE 0/5 WE 0/5  0/5                    RIGHT UE - ShAB 5/5, EF 5/5, EE 5/5, WE 5/5,  5/5                    LEFT    LE - HF 4/5, KE 4/5, DF 5/5, PF 5/5                    RIGHT LE - HF 4/5, KE 4/5, DF 5/5, PF 5/5          Sensory - impaired sensation on left upper and lower extremities                         Reflexes - DTR Intact     Toes are flexor     Coordination/dysmetria - FTN intact on right; unable to assess on left     Psychiatric - Mood stable, Affect WNL

## 2019-04-19 NOTE — H&P ADULT - NSHPREVIEWOFSYSTEMS_GEN_ALL_CORE
REVIEW OF SYSTEMS:  CONSTITUTIONAL: No fever, weight loss, or fatigue  EYES: No eye pain, visual disturbances, or discharge  ENMT:  No difficulty hearing, tinnitus, vertigo; No sinus or throat pain  NECK: No pain or stiffness  BREASTS: No pain, masses, or nipple discharge  RESPIRATORY: No cough, wheezing, chills or hemoptysis; No shortness of breath  CARDIOVASCULAR: No chest pain, palpitations, dizziness, or leg swelling  GASTROINTESTINAL: No abdominal or epigastric pain. No nausea, vomiting, or hematemesis; No diarrhea or constipation. No melena or hematochezia.  GENITOURINARY: No dysuria, frequency, hematuria, or incontinence  NEUROLOGICAL: No headaches, memory loss, loss of strength, numbness, or tremors  SKIN: No itching, burning, rashes, or lesions   LYMPH NODES: No enlarged glands  ENDOCRINE: No heat or cold intolerance; No hair loss  MUSCULOSKELETAL: No joint pain or swelling; No muscle, back, or extremity pain  PSYCHIATRIC: No depression, anxiety, mood swings, or difficulty sleeping  HEME/LYMPH: No easy bruising, or bleeding gums  ALLERY AND IMMUNOLOGIC: No hives or eczema REVIEW OF SYSTEMS:  CONSTITUTIONAL: No fever, weight loss, or fatigue  EYES: No eye pain, visual disturbances, or discharge  ENMT:  No difficulty hearing, tinnitus, vertigo; No sinus or throat pain  NECK: No pain or stiffness  RESPIRATORY: No cough, wheezing, chills or hemoptysis; No shortness of breath  CARDIOVASCULAR: No chest pain, palpitations, dizziness, or leg swelling  GASTROINTESTINAL: No abdominal or epigastric pain. No nausea, vomiting, or hematemesis; No diarrhea or constipation. No melena or hematochezia.  GENITOURINARY: No dysuria, frequency, hematuria, or incontinence  NEUROLOGICAL: No headaches, memory loss, loss of strength, numbness, or tremors  SKIN: No itching, burning, rashes, or lesions   ENDOCRINE: No heat or cold intolerance; No hair loss  MUSCULOSKELETAL: No joint pain or swelling; No muscle, back, or extremity pain  PSYCHIATRIC: No depression, anxiety, mood swings, or difficulty sleeping  HEME/LYMPH: No easy bruising, or bleeding gums  ALLERGY AND IMMUNOLOGIC: No hives or eczema REVIEW OF SYSTEMS:  CONSTITUTIONAL: No fever, (+) fatigue  EYES: No eye pain, visual disturbances  ENMT:  No difficulty hearing, tinnitus, vertigo  NECK: (+) neck pain  RESPIRATORY: No cough, wheezing, chills or hemoptysis; No shortness of breath  CARDIOVASCULAR: No chest pain, palpitations, dizziness, or leg swelling  GASTROINTESTINAL: No abdominal or epigastric pain. No nausea, vomiting, or hematemesis; (+) diarrhea  GENITOURINARY: No dysuria, frequency, hematuria, or incontinence  NEUROLOGICAL: No headaches, memory loss, (+) loss of strength, (+) LUE numbness  SKIN: No itching, burning, rashes  MUSCULOSKELETAL: No joint pain or swelling; No muscle, back, or extremity pain  PSYCHIATRIC: No depression, anxiety, mood swings, (+) difficulty sleeping  HEME/LYMPH: No easy bruising, or bleeding gums  ALLERGY AND IMMUNOLOGIC: No hives or eczema

## 2019-04-19 NOTE — H&P ADULT - ASSESSMENT
58 yo female s/p suspected CVA c weakness and functional deficits.       #r/o CVA  ASA    #tachycardia      #Seizure  Continue Depakote, seizure ppx      Precautions:      fall, seizures                                                                            Diet: reg    DVT Prophylaxis:          Lovenox                                                                Medical Prognosis:   fair    Prescreen Comparison: I have reviewed the prescreen information and I found no relevant changes between the preadmission  screening and my post admission evaluation.     Expected Therapy:   P.T.    1    hrs/day           O. T.   1   hrs/day           S.L.P.    1    hrs/day                    P&O    eval                                               Excpected Frequency: 5 days/7 day period    Rehab Potential:                good                           Estimated Disposition:       home                   ELOS:      14        days      Rationale For Inpatient Rehab Admission- Patient demonstrates the following:     [X] Medically appropriate for rehabilitation admission   [X] Has attainable rehab goals with an approrpriate discharge plan  [X] Has rehabilitation potential (expected to make significant improvement within a reasonable period of time)  [X] Requires close medical management by a rehab physician, rehab nursing care and comprehensive interdisciplinary team (including         PT, OT, SLP and/or prosthetics and orthotics) 58 yo female s/p suspected CVA c weakness and functional deficits.       #r/o CVA  ASA    #tachycardia  -baseline, monitor    #ILD  -pulmicort, xopenex, spiriva    #Seizure  Continue Depakote, seizure ppx      Precautions:      fall, seizures                                                                            Diet: reg    DVT Prophylaxis:          Lovenox                                                                Medical Prognosis:   fair    Prescreen Comparison: I have reviewed the prescreen information and I found no relevant changes between the preadmission  screening and my post admission evaluation.     Expected Therapy:   P.T.    1    hrs/day           O. T.   1   hrs/day           S.L.P.    1    hrs/day                    P&O    eval                                               Excpected Frequency: 5 days/7 day period    Rehab Potential:                good                           Estimated Disposition:       home                   ELOS:      14        days      Rationale For Inpatient Rehab Admission- Patient demonstrates the following:     [X] Medically appropriate for rehabilitation admission   [X] Has attainable rehab goals with an approrpriate discharge plan  [X] Has rehabilitation potential (expected to make significant improvement within a reasonable period of time)  [X] Requires close medical management by a rehab physician, rehab nursing care and comprehensive interdisciplinary team (including         PT, OT, SLP and/or prosthetics and orthotics) 60 yo female s/p suspected CVA vs. encephalitis admitted to rehab with left-sided weakness and functional deficits.       #r/o CVA  ASA    #tachycardia  -baseline, monitor    #ILD  -pulmicort, xopenex, spiriva  -on home O2 PRN. may resume PRN    #Seizure  Continue Depakote, seizure ppx    #diarrhea  -stop bowel meds   -nutrition consult for food preferences   -zinc oxide for dermatitis    Precautions:      fall, seizures                                                                            Diet: reg    DVT Prophylaxis:          Lovenox                                                                Medical Prognosis:   fair    Prescreen Comparison: I have reviewed the prescreen information and I found no relevant changes between the preadmission  screening and my post admission evaluation.     Expected Therapy:   P.T.    1    hrs/day           O. T.   1   hrs/day           S.L.P.    1    hrs/day                    P&O    eval                                               Excpected Frequency: 5 days/7 day period    Rehab Potential:                good                           Estimated Disposition:       home                   ELOS:      14        days      Rationale For Inpatient Rehab Admission- Patient demonstrates the following:     [X] Medically appropriate for rehabilitation admission   [X] Has attainable rehab goals with an approrpriate discharge plan  [X] Has rehabilitation potential (expected to make significant improvement within a reasonable period of time)  [X] Requires close medical management by a rehab physician, rehab nursing care and comprehensive interdisciplinary team (including         PT, OT, SLP and/or prosthetics and orthotics)

## 2019-04-20 LAB
ALBUMIN SERPL ELPH-MCNC: 2.5 G/DL — LOW (ref 3.3–5)
ALP SERPL-CCNC: 82 U/L — SIGNIFICANT CHANGE UP (ref 40–120)
ALT FLD-CCNC: 19 U/L DA — SIGNIFICANT CHANGE UP (ref 10–45)
ANION GAP SERPL CALC-SCNC: 9 MMOL/L — SIGNIFICANT CHANGE UP (ref 5–17)
AST SERPL-CCNC: 30 U/L — SIGNIFICANT CHANGE UP (ref 10–40)
BASOPHILS # BLD AUTO: 0.1 K/UL — SIGNIFICANT CHANGE UP (ref 0–0.2)
BASOPHILS NFR BLD AUTO: 1.4 % — SIGNIFICANT CHANGE UP (ref 0–2)
BILIRUB SERPL-MCNC: 0.2 MG/DL — SIGNIFICANT CHANGE UP (ref 0.2–1.2)
BUN SERPL-MCNC: 21 MG/DL — SIGNIFICANT CHANGE UP (ref 7–23)
CALCIUM SERPL-MCNC: 9.3 MG/DL — SIGNIFICANT CHANGE UP (ref 8.4–10.5)
CHLORIDE SERPL-SCNC: 103 MMOL/L — SIGNIFICANT CHANGE UP (ref 96–108)
CO2 SERPL-SCNC: 28 MMOL/L — SIGNIFICANT CHANGE UP (ref 22–31)
CREAT SERPL-MCNC: 0.69 MG/DL — SIGNIFICANT CHANGE UP (ref 0.5–1.3)
EOSINOPHIL # BLD AUTO: 0.3 K/UL — SIGNIFICANT CHANGE UP (ref 0–0.5)
EOSINOPHIL NFR BLD AUTO: 3 % — SIGNIFICANT CHANGE UP (ref 0–6)
GLUCOSE SERPL-MCNC: 91 MG/DL — SIGNIFICANT CHANGE UP (ref 70–99)
HCT VFR BLD CALC: 35.5 % — SIGNIFICANT CHANGE UP (ref 34.5–45)
HGB BLD-MCNC: 11.4 G/DL — LOW (ref 11.5–15.5)
LYMPHOCYTES # BLD AUTO: 1.5 K/UL — SIGNIFICANT CHANGE UP (ref 1–3.3)
LYMPHOCYTES # BLD AUTO: 17.8 % — SIGNIFICANT CHANGE UP (ref 13–44)
MCHC RBC-ENTMCNC: 31 PG — SIGNIFICANT CHANGE UP (ref 27–34)
MCHC RBC-ENTMCNC: 32.1 GM/DL — SIGNIFICANT CHANGE UP (ref 32–36)
MCV RBC AUTO: 96.7 FL — SIGNIFICANT CHANGE UP (ref 80–100)
MONOCYTES # BLD AUTO: 1.1 K/UL — HIGH (ref 0–0.9)
MONOCYTES NFR BLD AUTO: 13.4 % — SIGNIFICANT CHANGE UP (ref 2–14)
NEUTROPHILS # BLD AUTO: 5.4 K/UL — SIGNIFICANT CHANGE UP (ref 1.8–7.4)
NEUTROPHILS NFR BLD AUTO: 64.4 % — SIGNIFICANT CHANGE UP (ref 43–77)
PLATELET # BLD AUTO: 412 K/UL — HIGH (ref 150–400)
POTASSIUM SERPL-MCNC: 4.5 MMOL/L — SIGNIFICANT CHANGE UP (ref 3.5–5.3)
POTASSIUM SERPL-SCNC: 4.5 MMOL/L — SIGNIFICANT CHANGE UP (ref 3.5–5.3)
PROT SERPL-MCNC: 6.3 G/DL — SIGNIFICANT CHANGE UP (ref 6–8.3)
RBC # BLD: 3.67 M/UL — LOW (ref 3.8–5.2)
RBC # FLD: 14.4 % — SIGNIFICANT CHANGE UP (ref 10.3–14.5)
SODIUM SERPL-SCNC: 140 MMOL/L — SIGNIFICANT CHANGE UP (ref 135–145)
WBC # BLD: 8.4 K/UL — SIGNIFICANT CHANGE UP (ref 3.8–10.5)
WBC # FLD AUTO: 8.4 K/UL — SIGNIFICANT CHANGE UP (ref 3.8–10.5)

## 2019-04-20 PROCEDURE — 93010 ELECTROCARDIOGRAM REPORT: CPT

## 2019-04-20 PROCEDURE — 99223 1ST HOSP IP/OBS HIGH 75: CPT

## 2019-04-20 PROCEDURE — 99223 1ST HOSP IP/OBS HIGH 75: CPT | Mod: GC,AI

## 2019-04-20 RX ORDER — ONDANSETRON 8 MG/1
4 TABLET, FILM COATED ORAL EVERY 6 HOURS
Qty: 0 | Refills: 0 | Status: DISCONTINUED | OUTPATIENT
Start: 2019-04-20 | End: 2019-04-21

## 2019-04-20 RX ORDER — PHENYLEPHRINE-SHARK LIVER OIL-MINERAL OIL-PETROLATUM RECTAL OINTMENT
1 OINTMENT (GRAM) RECTAL
Qty: 0 | Refills: 0 | Status: DISCONTINUED | OUTPATIENT
Start: 2019-04-20 | End: 2019-04-23

## 2019-04-20 RX ADMIN — LIDOCAINE 1 APPLICATION(S): 4 CREAM TOPICAL at 14:39

## 2019-04-20 RX ADMIN — ONDANSETRON 4 MILLIGRAM(S): 8 TABLET, FILM COATED ORAL at 20:30

## 2019-04-20 RX ADMIN — Medication 81 MILLIGRAM(S): at 12:35

## 2019-04-20 RX ADMIN — HEPARIN SODIUM 5000 UNIT(S): 5000 INJECTION INTRAVENOUS; SUBCUTANEOUS at 10:04

## 2019-04-20 RX ADMIN — DIVALPROEX SODIUM 500 MILLIGRAM(S): 500 TABLET, DELAYED RELEASE ORAL at 06:33

## 2019-04-20 RX ADMIN — ZINC OXIDE 1 APPLICATION(S): 200 OINTMENT TOPICAL at 12:35

## 2019-04-20 RX ADMIN — Medication 2000 UNIT(S): at 12:35

## 2019-04-20 RX ADMIN — Medication 650 MILLIGRAM(S): at 16:27

## 2019-04-20 RX ADMIN — PHENYLEPHRINE-SHARK LIVER OIL-MINERAL OIL-PETROLATUM RECTAL OINTMENT 1 APPLICATION(S): at 14:39

## 2019-04-20 RX ADMIN — Medication 650 MILLIGRAM(S): at 16:58

## 2019-04-20 RX ADMIN — HEPARIN SODIUM 5000 UNIT(S): 5000 INJECTION INTRAVENOUS; SUBCUTANEOUS at 21:38

## 2019-04-20 RX ADMIN — Medication 0.25 MILLIGRAM(S): at 10:21

## 2019-04-20 RX ADMIN — PREGABALIN 1000 MICROGRAM(S): 225 CAPSULE ORAL at 12:34

## 2019-04-20 RX ADMIN — Medication 650 MILLIGRAM(S): at 00:50

## 2019-04-20 RX ADMIN — TIOTROPIUM BROMIDE 1 CAPSULE(S): 18 CAPSULE ORAL; RESPIRATORY (INHALATION) at 10:28

## 2019-04-20 RX ADMIN — DIVALPROEX SODIUM 500 MILLIGRAM(S): 500 TABLET, DELAYED RELEASE ORAL at 17:13

## 2019-04-20 RX ADMIN — PHENYLEPHRINE-SHARK LIVER OIL-MINERAL OIL-PETROLATUM RECTAL OINTMENT 1 APPLICATION(S): at 10:04

## 2019-04-20 NOTE — CHART NOTE - NSCHARTNOTEFT_GEN_A_CORE
Upon Nutritional Assessment by the Registered Dietitian your patient was determined to meet criteria / has evidence of the following diagnosis/diagnoses:          [ ]  Mild Protein Calorie Malnutrition        [ ]  Moderate Protein Calorie Malnutrition        [X] Severe Protein Calorie Malnutrition        [ ] Unspecified Protein Calorie Malnutrition        [X] Underweight / BMI <19        [ ] Morbid Obesity / BMI > 40      Findings as based on:  [x] Comprehensive nutrition assessment   [X] Nutrition Focused Physical Exam  [X] Other: Pt with evidence of severe muscle wasting/subcutaneous fat loss as per NFPE, reports <75% estimated energy needs > 1 month       Nutrition Plan/Recommendations:  Add Ensure Enlive 8oz daily        PROVIDER Section:     By signing this assessment you are acknowledging and agree with the diagnosis/diagnoses assigned by the Registered Dietitian    Comments:

## 2019-04-20 NOTE — PROVIDER CONTACT NOTE (MEDICATION) - SITUATION
Pt medical record indicates an allergy to aspirin; pt due for aspirin at 12pm. Pt states no rash or difficulty breathing with aspirin only stomach discomfort.

## 2019-04-20 NOTE — CONSULT NOTE ADULT - ASSESSMENT
60 yo female s/p suspected CVA vs. encephalitis admitted to rehab with left-sided weakness and functional deficits.     1. CVA: per neurology/REHAB TEAM. on ASA. Lipid profile reviewed, . COnsider statin. BP at goal.  2. Seizure d/o: as above. On depakote  3. Tachycardia: per records attributed to interstitial lung dz. Trend.  4. Constipation: has anal fissures and suspect dermatitis and anal fissures based on Hx. Rectal exam declined at this time. Trend BM, if diarrhea persists would like flat film KuB to evaluate stool burden and confirm contipation over actual diarrhea.  5. Prophylactic measure:  -states "allergy to ASA" need to clarify 58 yo female s/p suspected CVA vs. encephalitis admitted to rehab with left-sided weakness and functional deficits.     1. CVA: per neurology/REHAB TEAM. on ASA. Lipid profile reviewed, . COnsider statin. BP at goal.  2. Seizure d/o: as above. On depakote  3. Tachycardia: per records attributed to interstitial lung dz. Trend.  4. Constipation: has anal fissures and suspect dermatitis and anal fissures based on Hx. Rectal exam declined at this time. Trend BM, if diarrhea persists would like flat film KuB to evaluate stool burden and confirm contipation over actual diarrhea.  5. Anemia: 11.4, baseline appears to be in 12 range. Trend level.  6. Thrombophilia/thrombocytosis: acute phase reactant? Trend symptoms   7. Prophylactic measure:  -states "allergy to ASA" need to clarify 58 yo female s/p suspected CVA vs. encephalitis admitted to rehab with left-sided weakness and functional deficits.     1. CVA: per neurology/REHAB TEAM. on ASA. Lipid profile reviewed, . COnsider statin. BP at goal.  2. Seizure d/o: as above. On depakote  3. Tachycardia: per records attributed to interstitial lung dz. Trend.  4. Constipation: has anal fissures and suspect dermatitis and anal fissures based on Hx. Rectal exam declined at this time. Trend BM, if diarrhea persists would like flat film KuB to evaluate stool burden and confirm contipation over actual diarrhea.  5. Anemia: 11.4, baseline appears to be in 12 range. Trend level.  6. Thrombophilia/thrombocytosis: acute phase reactant? Trend symptoms   7. Pressure sore: stage 1. Barrier cream  8. Hemorrhoids: trial lidocaine gel. See above for constipation  9. Hypoalbuminemia: Boost/ensure. Dietician to eval  10. Prophylactic measure:  -states "allergy to ASA"patient confirms has stomach discomfort, denies rash dif breathing or anaphylaxis

## 2019-04-20 NOTE — DIETITIAN INITIAL EVALUATION ADULT. - PHYSICAL APPEARANCE
Pt with evidence of severe muscle wasting (temporal, clavicle, thigh, scapular) and subcutaneous fat loss (triceps, buccal)

## 2019-04-20 NOTE — DIETITIAN INITIAL EVALUATION ADULT. - ENERGY NEEDS
Height: 5'9", Weight: 124.7lbs (4/19)  Skin: no pressure ulcers, Edema: none  IBW range: 131-160lbs  Last BM: 4/19 (loose)

## 2019-04-20 NOTE — DIETITIAN INITIAL EVALUATION ADULT. - ORAL INTAKE PTA
Pt reports poor po intake over the last 5 months due to very low appetite- states that she associates her pain with eating even though she knows it is not correlated./poor

## 2019-04-20 NOTE — DIETITIAN INITIAL EVALUATION ADULT. - OTHER INFO
Pt is a 60 yo female s/p suspected CVA vs. encephalitis admitted to rehab with left-sided weakness and functional deficits. Pt tolerates regular consistency diet with report of somewhat improving appetite as she is trying to eat better. Pt confirms weight loss due to poor appetite over the last 5 months. Reports loose BM's previously, but none today- noted that bowel regimen was d/c'd. Discussed avoidance of fibrous foods until loose BM's resolve with inclusion of binding foods. Pt agreeable to trying Ensure supplement to increase caloric intake. Menu provided and ordering procedures reviewed.

## 2019-04-20 NOTE — DIETITIAN INITIAL EVALUATION ADULT. - NS AS NUTRI INTERV MEALS SNACK
Continue regular, lacto-ovo vegetarian diet. Pt instructed to avoid fiber and include binding foods until loose BM's resolve. Encouraged increased po fluids.

## 2019-04-20 NOTE — CONSULT NOTE ADULT - SUBJECTIVE AND OBJECTIVE BOX
[facility]; course prior to rehab admission	  	  60 yo female with PMHx of MVP, chronic SDH, interstitial lung disease/pneumothorax, pulmonary nodules, meniere's, non hodgkins lymphoma (SCD/XRT/chemo at [facility] 2003), TIA, tachycardia, occipital neuralgia, and neuropathy.  Patient was initially admitted to [facility] on 3/13/19 with acute onset of b/l UE weakness associated with nausea, blurry vision, and HA. CT head showed chronic B/L frontal SDH. The rest of the workup was negative. CVA/TIA r/out.  Pt was discharged home but on 3/17 had apparent seizure.  She was admitted to [facility] and intubated and placed on depakote.  CTA H/N, MRI, EEG unremarkable.  ID Recommended LP for fever; family deferred.  Transferred to [facility] on 3/19/19.  MRI brain showed cerebral ischemia. The rest of the workup was negative, this including CSF studies. EEG neg seizures. (Evaluated by lymphoma specialist/onco, PET 4/5/19 r/o recurrence of lymphoma. Increased signal at base of tongue to follow up outpt ENT). Additionally, course complicated with pneumothorax, pulmonary consulted, no interventions, self resolved.  Tachycardia - per pulm, related related to chronic lung disease.  GI consulted for rectal pain; diagnosed c anal fissure/constipation, bowel regimen started. KUB 4/12/19 No stool, no free air, no distended loops of small bowel, fibroids. Refused further workup.   Other issues while admitted:  Hypotension - home diltiazem held per cardio  dizziness - sporadic, self resolving.  Pt deferred MRI.  May be 2/2 to hx of Meniere's disease.      *TTE 3/18/19 EF 60%, mod-severe MR.   MRI brain 3/29/19 cortically based restricted diffusion within R>L frontoparietal region as well as additional scattered small foci, may represent evolution of a recent ischemia vs improving encephalitis.     Patient medically optimized and cleared for discharge to acute rehab at [facility] on 4/19/19.  Patient seen and examined upon arrival at [facility].  She had no new complaints at that time.     Interval events/ROS: feeling better today. Complains of constipation and rectal pain relieved by supine position. States "it hurts when I sit up" She denies headache CP SOB fever or chills.     PAST MEDICAL & SURGICAL HISTORY:  Interstitial lung disease: on home o2 prn  NHL (non-Hodgkin's lymphoma): Agem 43 sp chemo/rt/stem cell  Transient cerebral ischemia, unspecified type  Mitral prolapse  History of tonsillectomy  History of appendectomy    FH: Denies  Allergies: ASA, COntrast, SHellfhish  MEDICATIONS  (STANDING):  aspirin  chewable 81 milliGRAM(s) Oral daily  buDESOnide   0.25 milliGRAM(s) Respule 0.25 milliGRAM(s) Inhalation two times a day  cholecalciferol 2000 Unit(s) Oral daily  cyanocobalamin 1000 MICROGram(s) Oral daily  diVALproex  milliGRAM(s) Oral two times a day  heparin  Injectable 5000 Unit(s) SubCutaneous <User Schedule>  tiotropium 18 MICROgram(s) Capsule 1 Capsule(s) Inhalation daily  zinc oxide 20% Ointment 1 Application(s) Topical daily    MEDICATIONS  (PRN):  acetaminophen   Tablet .. 650 milliGRAM(s) Oral every 6 hours PRN Mild Pain (1 - 3)  levalbuterol Inhalation 0.63 milliGRAM(s) Inhalation every 8 hours PRN bronchospasm  lidocaine 2% Gel 1 Application(s) Topical daily PRN pain  melatonin 3 milliGRAM(s) Oral at bedtime PRN Sleep  simethicone 80 milliGRAM(s) Chew every 6 hours PRN Gas    Social Hx: .  Lives with , daughter in private house. Denies toxic habits    Physical Exam    Vital Signs Last 24 Hrs  T(C): 36.8 (20 Apr 2019 08:05), Max: 36.8 (19 Apr 2019 21:32)  T(F): 98.2 (20 Apr 2019 08:05), Max: 98.3 (19 Apr 2019 21:32)  HR: 103 (20 Apr 2019 08:05) (100 - 103)  BP: 112/77 (20 Apr 2019 08:05) (101/66 - 112/77)  BP(mean): --  RR: 14 (20 Apr 2019 08:05) (14 - 144)  SpO2: 98% (20 Apr 2019 08:05) (98% - 98%)    Constitutional - NAD, alert X 3  	HEENT - NCAT, EOMI  	Neck - Supple, No limited ROM  	Chest - CTA bilaterally, No wheeze, No rhonchi, No crackles  	Cardiovascular - RRR, S1S2, (+)MV murmur  	Abdomen - BS+, Soft, NTND   no SPT  	Extremities - No C/C/E, No calf tendern                          11.4   8.4   )-----------( 412      ( 20 Apr 2019 06:11 )             35.5     20 Apr 2019 06:11    140    |  103    |  21     ----------------------------<  91     4.5     |  28     |  0.69     Ca    9.3        20 Apr 2019 06:11    TPro  6.3    /  Alb  2.5    /  TBili  0.2    /  DBili  x      /  AST  30     /  ALT  19     /  AlkPhos  82     20 Apr 2019 06:11    LIVER FUNCTIONS - ( 20 Apr 2019 06:11 )  Alb: 2.5 g/dL / Pro: 6.3 g/dL / ALK PHOS: 82 U/L / ALT: 19 U/L DA / AST: 30 U/L / GGT: x             CAPILLARY BLOOD GLUCOSE [facility]; course prior to rehab admission	  	  60 yo female with PMHx of MVP, chronic SDH, interstitial lung disease/pneumothorax, pulmonary nodules, meniere's, non hodgkins lymphoma (SCD/XRT/chemo at [facility] 2003), TIA, tachycardia, occipital neuralgia, and neuropathy.  Patient was initially admitted to [facility] on 3/13/19 with acute onset of b/l UE weakness associated with nausea, blurry vision, and HA. CT head showed chronic B/L frontal SDH. The rest of the workup was negative. CVA/TIA r/out.  Pt was discharged home but on 3/17 had apparent seizure.  She was admitted to [facility] and intubated and placed on depakote.  CTA H/N, MRI, EEG unremarkable.  ID Recommended LP for fever; family deferred.  Transferred to [facility] on 3/19/19.  MRI brain showed cerebral ischemia. The rest of the workup was negative, this including CSF studies. EEG neg seizures. (Evaluated by lymphoma specialist/onco, PET 4/5/19 r/o recurrence of lymphoma. Increased signal at base of tongue to follow up outpt ENT). Additionally, course complicated with pneumothorax, pulmonary consulted, no interventions, self resolved.  Tachycardia - per pulm, related related to chronic lung disease.  GI consulted for rectal pain; diagnosed c anal fissure/constipation, bowel regimen started. KUB 4/12/19 No stool, no free air, no distended loops of small bowel, fibroids. Refused further workup.   Other issues while admitted:  Hypotension - home diltiazem held per cardio  dizziness - sporadic, self resolving.  Pt deferred MRI.  May be 2/2 to hx of Meniere's disease.      *TTE 3/18/19 EF 60%, mod-severe MR.   MRI brain 3/29/19 cortically based restricted diffusion within R>L frontoparietal region as well as additional scattered small foci, may represent evolution of a recent ischemia vs improving encephalitis.     Patient medically optimized and cleared for discharge to acute rehab at [facility] on 4/19/19.  Patient seen and examined upon arrival at [facility].  She had no new complaints at that time.     Interval events/ROS: feeling better today. Complains of constipation and rectal pain relieved by supine position. States "it hurts when I sit up" She denies headache CP SOB fever or chills.     PAST MEDICAL & SURGICAL HISTORY:  Interstitial lung disease: on home o2 prn  NHL (non-Hodgkin's lymphoma): Agem 43 sp chemo/rt/stem cell  Transient cerebral ischemia, unspecified type  Mitral prolapse  History of tonsillectomy  History of appendectomy    FH: Denies  Allergies: ASA, COntrast, SHellfhish  MEDICATIONS  (STANDING):  aspirin  chewable 81 milliGRAM(s) Oral daily  buDESOnide   0.25 milliGRAM(s) Respule 0.25 milliGRAM(s) Inhalation two times a day  cholecalciferol 2000 Unit(s) Oral daily  cyanocobalamin 1000 MICROGram(s) Oral daily  diVALproex  milliGRAM(s) Oral two times a day  heparin  Injectable 5000 Unit(s) SubCutaneous <User Schedule>  tiotropium 18 MICROgram(s) Capsule 1 Capsule(s) Inhalation daily  zinc oxide 20% Ointment 1 Application(s) Topical daily    MEDICATIONS  (PRN):  acetaminophen   Tablet .. 650 milliGRAM(s) Oral every 6 hours PRN Mild Pain (1 - 3)  levalbuterol Inhalation 0.63 milliGRAM(s) Inhalation every 8 hours PRN bronchospasm  lidocaine 2% Gel 1 Application(s) Topical daily PRN pain  melatonin 3 milliGRAM(s) Oral at bedtime PRN Sleep  simethicone 80 milliGRAM(s) Chew every 6 hours PRN Gas    Social Hx: .  Lives with , daughter in private house. Denies toxic habits    Physical Exam    Vital Signs Last 24 Hrs  T(C): 36.8 (20 Apr 2019 08:05), Max: 36.8 (19 Apr 2019 21:32)  T(F): 98.2 (20 Apr 2019 08:05), Max: 98.3 (19 Apr 2019 21:32)  HR: 103 (20 Apr 2019 08:05) (100 - 103)  BP: 112/77 (20 Apr 2019 08:05) (101/66 - 112/77)  BP(mean): --  RR: 14 (20 Apr 2019 08:05) (14 - 144)  SpO2: 98% (20 Apr 2019 08:05) (98% - 98%)    Constitutional - NAD, alert X 3  	HEENT - NCAT, EOMI  	Neck - Supple, No limited ROM  	Chest - CTA bilaterally, No wheeze, No rhonchi, No crackles  	Cardiovascular - RRR, S1S2, (+)MV murmur  	Abdomen - BS+, Soft, NTND  Rectal: chaperoned with (female)PT at bedside. Large external hemorrhoids, bilat gluteal non blanchable erythema.   no SPT  	Extremities - No C/C/E, No calf tendern                          11.4   8.4   )-----------( 412      ( 20 Apr 2019 06:11 )             35.5     20 Apr 2019 06:11    140    |  103    |  21     ----------------------------<  91     4.5     |  28     |  0.69     Ca    9.3        20 Apr 2019 06:11    TPro  6.3    /  Alb  2.5    /  TBili  0.2    /  DBili  x      /  AST  30     /  ALT  19     /  AlkPhos  82     20 Apr 2019 06:11    LIVER FUNCTIONS - ( 20 Apr 2019 06:11 )  Alb: 2.5 g/dL / Pro: 6.3 g/dL / ALK PHOS: 82 U/L / ALT: 19 U/L DA / AST: 30 U/L / GGT: x             CAPILLARY BLOOD GLUCOSE

## 2019-04-21 PROCEDURE — 99232 SBSQ HOSP IP/OBS MODERATE 35: CPT

## 2019-04-21 RX ORDER — ALPRAZOLAM 0.25 MG
0.12 TABLET ORAL THREE TIMES A DAY
Qty: 0 | Refills: 0 | Status: DISCONTINUED | OUTPATIENT
Start: 2019-04-21 | End: 2019-04-23

## 2019-04-21 RX ORDER — ONDANSETRON 8 MG/1
4 TABLET, FILM COATED ORAL EVERY 6 HOURS
Qty: 0 | Refills: 0 | Status: DISCONTINUED | OUTPATIENT
Start: 2019-04-21 | End: 2019-04-23

## 2019-04-21 RX ADMIN — Medication 650 MILLIGRAM(S): at 21:28

## 2019-04-21 RX ADMIN — Medication 650 MILLIGRAM(S): at 15:57

## 2019-04-21 RX ADMIN — Medication 81 MILLIGRAM(S): at 11:44

## 2019-04-21 RX ADMIN — PREGABALIN 1000 MICROGRAM(S): 225 CAPSULE ORAL at 11:44

## 2019-04-21 RX ADMIN — Medication 650 MILLIGRAM(S): at 15:26

## 2019-04-21 RX ADMIN — Medication 650 MILLIGRAM(S): at 22:25

## 2019-04-21 RX ADMIN — DIVALPROEX SODIUM 500 MILLIGRAM(S): 500 TABLET, DELAYED RELEASE ORAL at 06:45

## 2019-04-21 RX ADMIN — TIOTROPIUM BROMIDE 1 CAPSULE(S): 18 CAPSULE ORAL; RESPIRATORY (INHALATION) at 09:29

## 2019-04-21 RX ADMIN — HEPARIN SODIUM 5000 UNIT(S): 5000 INJECTION INTRAVENOUS; SUBCUTANEOUS at 10:44

## 2019-04-21 RX ADMIN — DIVALPROEX SODIUM 500 MILLIGRAM(S): 500 TABLET, DELAYED RELEASE ORAL at 17:22

## 2019-04-21 RX ADMIN — PHENYLEPHRINE-SHARK LIVER OIL-MINERAL OIL-PETROLATUM RECTAL OINTMENT 1 APPLICATION(S): at 17:20

## 2019-04-21 RX ADMIN — Medication 2000 UNIT(S): at 11:44

## 2019-04-21 RX ADMIN — ONDANSETRON 4 MILLIGRAM(S): 8 TABLET, FILM COATED ORAL at 14:55

## 2019-04-21 RX ADMIN — Medication 0.25 MILLIGRAM(S): at 09:32

## 2019-04-21 RX ADMIN — PHENYLEPHRINE-SHARK LIVER OIL-MINERAL OIL-PETROLATUM RECTAL OINTMENT 1 APPLICATION(S): at 06:46

## 2019-04-21 RX ADMIN — ZINC OXIDE 1 APPLICATION(S): 200 OINTMENT TOPICAL at 11:44

## 2019-04-21 RX ADMIN — HEPARIN SODIUM 5000 UNIT(S): 5000 INJECTION INTRAVENOUS; SUBCUTANEOUS at 21:01

## 2019-04-21 NOTE — PROGRESS NOTE ADULT - SUBJECTIVE AND OBJECTIVE BOX
Patient had another difficult night.  She is very anxious. No premorbid history as per patient.  RN agrees that she needs a behavioral assessment.  Continues to have pain in her buttock area.  Feels restless and is frustrated.     REVIEW OF SYSTEMS  Constitutional - No fever,  +fatigue  HEENT - No vertigo, No neck pain  Neurological - No headaches, +loss of strength  Musculoskeletal - +joint pain, +muscle pain    VITALS  T(C): 36.9 (04-21-19 @ 07:41), Max: 37 (04-20-19 @ 21:25)  HR: 116 (04-21-19 @ 07:41) (100 - 116)  BP: 113/75 (04-21-19 @ 07:41) (113/75 - 119/81)  RR: 16 (04-21-19 @ 07:41) (16 - 16)  SpO2: 98% (04-21-19 @ 07:41) (92% - 98%)  Wt(kg): --       MEDICATIONS   acetaminophen   Tablet .. 650 milliGRAM(s) every 6 hours PRN  aspirin  chewable 81 milliGRAM(s) daily  buDESOnide   0.25 milliGRAM(s) Respule 0.25 milliGRAM(s) two times a day  cholecalciferol 2000 Unit(s) daily  cyanocobalamin 1000 MICROGram(s) daily  diVALproex  milliGRAM(s) two times a day  hemorrhoidal Ointment 1 Application(s) four times a day PRN  heparin  Injectable 5000 Unit(s) <User Schedule>  levalbuterol Inhalation 0.63 milliGRAM(s) every 8 hours PRN  lidocaine 2% Gel 1 Application(s) daily PRN  melatonin 3 milliGRAM(s) at bedtime PRN  ondansetron    Tablet 4 milliGRAM(s) every 6 hours PRN  simethicone 80 milliGRAM(s) every 6 hours PRN  tiotropium 18 MICROgram(s) Capsule 1 Capsule(s) daily  zinc oxide 20% Ointment 1 Application(s) daily      RECENT LABS/IMAGING                        11.4   8.4   )-----------( 412      ( 20 Apr 2019 06:11 )             35.5     04-20    140  |  103  |  21  ----------------------------<  91  4.5   |  28  |  0.69    Ca    9.3      20 Apr 2019 06:11    TPro  6.3  /  Alb  2.5<L>  /  TBili  0.2  /  DBili  x   /  AST  30  /  ALT  19  /  AlkPhos  82  04-20                  ---------  PHYSICAL EXAM  Constitutional - NAD, Uncomfortable  Pulm - Breathing comfortably, No wheezing  Abd - Soft, NTND  Extremities - No edema, No calf tenderness  Neurologic Exam -                    Cognitive - Awake, Alert     Communication - Mild dysarthria     Motor - Left UE weakness, poor coordination  Psychiatric - Anxious    ASSESSMENT/PLAN  59y Female with functional deficits after CVA/encephalitis  CVA - ASA, Lipitor  Seizure PPX - Depakote  Pain - Tylenol PRN  DVT PPX - Heparin  Mood - Consider short and long acting medication to assist with her anxiety, as it may interfere with her recovery  Constipation/GI - Prep H, Simethicone  Sleep - Melatonin    Continue 3hrs a day of comprehensive rehab program.

## 2019-04-22 DIAGNOSIS — C85.90 NON-HODGKIN LYMPHOMA, UNSPECIFIED, UNSPECIFIED SITE: ICD-10-CM

## 2019-04-22 DIAGNOSIS — G93.41 METABOLIC ENCEPHALOPATHY: ICD-10-CM

## 2019-04-22 PROCEDURE — 71045 X-RAY EXAM CHEST 1 VIEW: CPT | Mod: 26

## 2019-04-22 PROCEDURE — 99223 1ST HOSP IP/OBS HIGH 75: CPT

## 2019-04-22 PROCEDURE — 99233 SBSQ HOSP IP/OBS HIGH 50: CPT

## 2019-04-22 PROCEDURE — 93010 ELECTROCARDIOGRAM REPORT: CPT

## 2019-04-22 PROCEDURE — 99222 1ST HOSP IP/OBS MODERATE 55: CPT

## 2019-04-22 RX ORDER — DOCUSATE SODIUM 100 MG
100 CAPSULE ORAL
Qty: 0 | Refills: 0 | Status: DISCONTINUED | OUTPATIENT
Start: 2019-04-22 | End: 2019-04-23

## 2019-04-22 RX ORDER — TRAMADOL HYDROCHLORIDE 50 MG/1
50 TABLET ORAL THREE TIMES A DAY
Qty: 0 | Refills: 0 | Status: DISCONTINUED | OUTPATIENT
Start: 2019-04-22 | End: 2019-04-23

## 2019-04-22 RX ORDER — LIDOCAINE 4 G/100G
1 CREAM TOPICAL
Qty: 0 | Refills: 0 | Status: DISCONTINUED | OUTPATIENT
Start: 2019-04-22 | End: 2019-04-23

## 2019-04-22 RX ORDER — METOPROLOL TARTRATE 50 MG
12.5 TABLET ORAL EVERY 12 HOURS
Qty: 0 | Refills: 0 | Status: DISCONTINUED | OUTPATIENT
Start: 2019-04-22 | End: 2019-04-23

## 2019-04-22 RX ADMIN — LIDOCAINE 1 PATCH: 4 CREAM TOPICAL at 18:10

## 2019-04-22 RX ADMIN — PREGABALIN 1000 MICROGRAM(S): 225 CAPSULE ORAL at 12:34

## 2019-04-22 RX ADMIN — HEPARIN SODIUM 5000 UNIT(S): 5000 INJECTION INTRAVENOUS; SUBCUTANEOUS at 10:35

## 2019-04-22 RX ADMIN — Medication 650 MILLIGRAM(S): at 08:34

## 2019-04-22 RX ADMIN — ZINC OXIDE 1 APPLICATION(S): 200 OINTMENT TOPICAL at 12:37

## 2019-04-22 RX ADMIN — TIOTROPIUM BROMIDE 1 CAPSULE(S): 18 CAPSULE ORAL; RESPIRATORY (INHALATION) at 08:07

## 2019-04-22 RX ADMIN — Medication 81 MILLIGRAM(S): at 12:34

## 2019-04-22 RX ADMIN — HEPARIN SODIUM 5000 UNIT(S): 5000 INJECTION INTRAVENOUS; SUBCUTANEOUS at 21:48

## 2019-04-22 RX ADMIN — Medication 2000 UNIT(S): at 12:34

## 2019-04-22 RX ADMIN — DIVALPROEX SODIUM 500 MILLIGRAM(S): 500 TABLET, DELAYED RELEASE ORAL at 06:29

## 2019-04-22 RX ADMIN — Medication 650 MILLIGRAM(S): at 23:39

## 2019-04-22 RX ADMIN — LIDOCAINE 1 PATCH: 4 CREAM TOPICAL at 12:36

## 2019-04-22 RX ADMIN — DIVALPROEX SODIUM 500 MILLIGRAM(S): 500 TABLET, DELAYED RELEASE ORAL at 18:10

## 2019-04-22 NOTE — CONSULT NOTE ADULT - ASSESSMENT
59W s/p suspected CVA vs. encephalitis admitted to rehab with left-sided weakness and functional deficits.   Pulm:  Probable ipf. Will consider updated ct of chest.  No hx of asthma/copd.   Ok for b blocker for cardiac issues.  CVA: per neurology/REHAB TEAM. on ASA  - start statin for primary stroke prevention  - on asa but with allergy to asa?   - monitor BP    Seizure   - depakote     Tachycardia  - per records secondary to restrictive lung disease  - patient is on 2 L NC at home PRN? per daughter  - She apparently will spike to 130-140s at home and that's why she was previously on cardizem however her relative hypotension precludes her from adding it back currently  - check x-ray to rule out worsening pneumothorax  - consider oxygen when doing PT/OT    Anemia of chronic disease  - stable     Thrombocytopenia  - stable

## 2019-04-22 NOTE — PROGRESS NOTE ADULT - ATTENDING COMMENTS
Chart reviewed, patient examined  Case discussed with Medicine, Cardiology and Pulmonary.  Awaiting Hematology evaluation  Etiology of patient's symptoms and deficits remains unclear. Vascular/ischemic vs inflammatory vs paraneoplastic?  Spoke with patient's daughter, details of the history confirmed.  Patient examined with team, will continue to monitor and coordinate care with consultants.

## 2019-04-22 NOTE — CONSULT NOTE ADULT - SUBJECTIVE AND OBJECTIVE BOX
HPI:HPI:  60 yo female with PMHx of MVP, chronic SDH, interstitial lung disease/pneumothorax, pulmonary nodules, meniere's, non hodgkins lymphoma (SCD/XRT/chemo at [facility] 2003), TIA, tachycardia, occipital neuralgia, and neuropathy.  Patient was initially admitted to [facility] on 3/13/19 with acute onset of b/l UE weakness associated with nausea, blurry vision, and HA. CT head showed chronic B/L frontal SDH. The rest of the workup was negative. CVA/TIA r/out.  Pt was discharged home but on 3/17 had apparent seizure.  She was admitted to [facility] and intubated and placed on depakote.  CTA H/N, MRI, EEG unremarkable.  ID Recommended LP for fever; family deferred.  Transferred to [facility] on 3/19/19.  MRI brain showed cerebral ischemia. The rest of the workup was negative, this including CSF studies. EEG neg seizures. (Evaluated by lymphoma specialist/onco, PET 4/5/19 r/o recurrence of lymphoma. Increased signal at base of tongue to follow up outpt ENT). Additionally, course complicated with pneumothorax, pulmonary consulted, no interventions, self resolved.  Tachycardia - per pulm, related related to chronic lung disease.  GI consulted for rectal pain; diagnosed c anal fissure/constipation, bowel regimen started. KUB 4/12/19 No stool, no free air, no distended loops of small bowel, fibroids. Refused further workup.   Other issues while admitted:  Hypotension - home diltiazem held per cardio  dizziness - sporadic, self resolving.  Pt deferred MRI.  May be 2/2 to hx of Meniere's disease.      *TTE 3/18/19 EF 60%, mod-severe MR.   MRI brain 3/29/19 cortically based restricted diffusion within R>L frontoparietal region as well as additional scattered small foci, may represent evolution of a recent ischemia vs improving encephalitis.     Patient medically optimized and cleared for discharge to acute rehab at [facility] on 4/19/19.  Patient seen and examined upon arrival at [facility].  She had no new complaints at that time. (19 Apr 2019 13:31)  Pulm:  ct reportedly showed changes consistent with pulm fibrosis.  Pt had seen cts at [facility] and was offered a bx which she was not interesed in.     PAST MEDICAL & SURGICAL HISTORY:  Interstitial lung disease: on home o2 prn  NHL (non-Hodgkin's lymphoma): Agem 43 sp chemo/rt/stem cell  Transient cerebral ischemia, unspecified type  Mitral prolapse  History of tonsillectomy  History of appendectomy      FAMILY HISTORY:  Family history of stroke  Family history of breast cancer: Mother      SOCIAL HISTORY:  Smoking: _former      Allergies    aspirin (Other (Unknown))  IV Contrast (Anaphylaxis)  shellfish. (Anaphylaxis)    Intolerances        HOME  MEDICATIONS:Home Medications:      REVIEW OF SYSTEMS:  Constitutional: No fevers or chills. No weight loss. .  Eyes: No itching or discharge from the eyes  ENT: . No nasal congestion. No post nasal drip  CV: No chest pain. No palpitations. No lightheadedness or dizziness.   Resp: No dyspnea at rest. No dyspnea on exertion. .  GI: No nausea. No vomiting. No diarrhea.  MSK: No joint pain or pain in any extremities  Integumentary: No skin lesions. No pedal edema.  Neurological: hemiplegia        OBJECTIVE:  ICU Vital Signs Last 24 Hrs  T(C): 36.4 (22 Apr 2019 08:36), Max: 36.6 (21 Apr 2019 20:50)  T(F): 97.5 (22 Apr 2019 08:36), Max: 97.8 (21 Apr 2019 20:50)  HR: 130 (22 Apr 2019 08:36) (75 - 130)  BP: 111/77 (22 Apr 2019 08:36) (111/77 - 122/72)  BP(mean): --  ABP: --  ABP(mean): --  RR: 16 (22 Apr 2019 08:36) (16 - 16)  SpO2: 97% (22 Apr 2019 08:36) (97% - 100%)        CAPILLARY BLOOD GLUCOSE          PHYSICAL EXAM:  General: Awake, alert, oriented X 3.   HEENT: Atraumatic, normocephalic.                            .  Lymph Nodes: No palpable lymphadenopathy  Neck: No JVD. No carotid bruit.   Respiratory: bilat rales  Cardiovascular: S1 S2 normal. No murmurs, rubs or gallops.   Abdomen: Soft, non-tender,   Extremities: Warm to touch.    Skin: No rashes or skin lesions  Neurological: Motor and sensory examination as per rehab  Psychiatry: Appropriate mood and affect.    HOSPITAL MEDICATIONS:  MEDICATIONS  (STANDING):  aspirin  chewable 81 milliGRAM(s) Oral daily  buDESOnide   0.25 milliGRAM(s) Respule 0.25 milliGRAM(s) Inhalation two times a day  cholecalciferol 2000 Unit(s) Oral daily  cyanocobalamin 1000 MICROGram(s) Oral daily  diVALproex  milliGRAM(s) Oral two times a day  docusate sodium 100 milliGRAM(s) Oral two times a day  heparin  Injectable 5000 Unit(s) SubCutaneous <User Schedule>  lidocaine   Patch 1 Patch Transdermal <User Schedule>  metoprolol tartrate 12.5 milliGRAM(s) Oral every 12 hours  tiotropium 18 MICROgram(s) Capsule 1 Capsule(s) Inhalation daily  zinc oxide 20% Ointment 1 Application(s) Topical daily    MEDICATIONS  (PRN):  acetaminophen   Tablet .. 650 milliGRAM(s) Oral every 6 hours PRN Mild Pain (1 - 3)  ALPRAZolam 0.125 milliGRAM(s) Oral three times a day PRN Anxiety  hemorrhoidal Ointment 1 Application(s) Rectal four times a day PRN post-BM, pain  levalbuterol Inhalation 0.63 milliGRAM(s) Inhalation every 8 hours PRN bronchospasm  lidocaine 2% Gel 1 Application(s) Topical daily PRN pain  melatonin 3 milliGRAM(s) Oral at bedtime PRN Sleep  ondansetron   Disintegrating Tablet 4 milliGRAM(s) Oral every 6 hours PRN Nausea and/or Vomiting  simethicone 80 milliGRAM(s) Chew every 6 hours PRN Gas  traMADol 50 milliGRAM(s) Oral three times a day PRN Severe Pain (7 - 10)      LABS:                    MICROBIOLOGY:     RADIOLOGY:  [ ] Reviewed and interpreted by me   < from: Xray Chest 1 View AP/PA (03.12.19 @ 21:26) >    EXAM:  XR CHEST AP OR PA 1V                            PROCEDURE DATE:  03/12/2019          INTERPRETATION:  Clinical information: Headache. Bilateral arm weakness.   History of stem cell transplantation.    Technique: Frontal view of the chest.    Comparison: Prior chest x-ray examination from 2/7/2019. Prior chest CT   examination from 9/21/2018.    Findings: Peripheral reticular and nodular opacities with an upper lobe   predominance are again noted. There is architectural distortion of the   lungs. A very small right-sided pneumothorax appears unchanged. There is   narrowing of the heart size in transverse dimensions. There is an   enlarged main pulmonary arterial trunk. Multilevel degenerative changes   are noted within the imaged potions of the spine.    IMPRESSION: Similar-appearing fibrotic changes throughout both lungs   along with an enlarged main pulmonary artery suggestive of pulmonary   arterial hypertension. Clinical correlation is required.    Unchanged very small right pneumothorax.    < end of copied text >    < from: CT Chest No Cont (08.21.18 @ 16:31) >  EXAM:  CT CHEST                            PROCEDURE DATE:  08/21/2018          INTERPRETATION:  CLINICAL INFORMATION: History of non-Hodgkin's disease,   abnormal chest radiograph    TECHNIQUE: CT scan of the chest was obtained without the administration   of contrast. Axial MIP images as well as coronal and sagittal   reformations were also obtained.    COMPARISON: No prior CT scan is available for comparison.    FINDINGS:  The thyroid gland is unremarkable.    The trachea and main bronchi are patent without endobronchial lesions.    There is no significant supraclavicular, mediastinal, hilar or axillary   adenopathy.    The lungs demonstrate patchy groundglass opacities with consolidations in   the bilateral lower lobes, right greater than left. There are several   solid pulmonary nodules in the lower lobes. For example there is a 7 x 6   mm noncalcified solid nodule in the right lower lobe (series 2, image   57). There is a 16 x 8 mm elongated noncalcified nodule in the left lower   lobe (image 57). In addition, there is diffuse pleural thickening.    The heart size is within normal limits. There is small pericardial   effusion.    The osseous structures are unremarkable.    CT scan of the upper abdomen shows no gross abnormalities.    IMPRESSION:  Patchy groundglass opacities with consolidations and multiple bilateral   pulmonary nodules as well as diffuse pleural thickening. Based on the   patient's history of non-Hodgkin's disease, these findings may represent   extranodal lymphoma. Differential diagnosis also includes inflammatory or   atypical infectious processes.    Small pericardial effusion.    < end of copied text >

## 2019-04-22 NOTE — CONSULT NOTE ADULT - ASSESSMENT
This is a 59 year old woman with a complex medial history., bilateral frontal SDH, new onset seizures.  She has a history of a NHL whish she states was treated years ago. Patient unable or unclear about her history, then declined further interview and exam. Will try again tomorrow.   During the day, can try contacting her family tomorrow to get the information.  From what I can gomez from the EMR, there was some concern bout a recurrence at the level of the tongue on the PET scan from 4/5/19, will try to track down this result of this. There was also a question of a ground glass opacity from 8/21/18 that had some suspicions of an extra nodular involvement of the lymphoma.  This was essentially re-evaluated on the PET scan from 4/5/19 so can make a determination once I get this result.    Can check LDH which could be high if there was an aggressive lymphoma.    Patient has a mild anemia, recommended checking a B12, folic acid, Ferritin and TIBC.  This is a minor point compared to the rest of patient's presentation and is not a major problem.

## 2019-04-22 NOTE — CONSULT NOTE ADULT - REASON FOR ADMISSION
s/p CVA c left sided weakness and functional deficits.

## 2019-04-22 NOTE — CONSULT NOTE ADULT - ASSESSMENT
In summary the patient is a 59 year old woman with sinus tachycardia. THe tachycardia may be multifactorial and could be mostly due to her chronic lung disease.    Would check for thyroid issues and recheck cxr for pneumothorax if patient is felt to be more tachycardiac todya.    in these cases treatment of the underlying non cardiac causes usually is the mainstay of therapy.    if felt safe by pulmonary service a trial of low dose bblocker such as metroprolol tartrate 12.5 mg could be considered

## 2019-04-22 NOTE — PROVIDER CONTACT NOTE (OTHER) - BACKGROUND
upon admission allergies discussed w/pt. pt stated she had allergy to aspirin. when asked about reaction she had taking it pt stated everything. when asked further pt stated she did not like taking it

## 2019-04-22 NOTE — CONSULT NOTE ADULT - SUBJECTIVE AND OBJECTIVE BOX
This is a 59 year old woman who admitted for upper extremity weakness, nausea blurry vision and headache.  Hx of chronic fontal bilateral Subdural hematoma.  Patient was discharged from [facility], but re admitted to [facility] after a seizure 3/17/19.  From there she was transferred to [facility] 3/9/19 MRI brain showed cerebral ischemia there.  Patient has a history of a Non Hodgekins lymphoma treated 13 years ago.  She had a PET scan done on 4/5/19 r/o which is said to show an abnormal signal at base of the tongue.  She also had a CT scan 8/21/18 of the chest, and there was mention of a lung ground glass opacity which could have represented extra pal involvement of the lymphoma, however this could have been any type of inflammation or interstitial lung disease.  Patient  now admitted for acute rehab at [facility] where she has been since 4/19/19.    During the current hospital stay shes also developed a midl anemia Hg 11.4g/dl.

## 2019-04-22 NOTE — PROGRESS NOTE ADULT - SUBJECTIVE AND OBJECTIVE BOX
CHIEF COMPLAINT: Rectal pain, lower back pain, weakness.       HISTORY OF PRESENT ILLNESS  58 yo female with PMHx of MVP, chronic SDH, interstitial lung disease/pneumothorax, pulmonary nodules, meniere's, non hodgkins lymphoma (SCD/XRT/chemo at [facility] 2003), TIA, tachycardia, occipital neuralgia, and neuropathy.  Patient was initially admitted to [place] on 3/13/19 with acute onset of b/l UE weakness associated with nausea, blurry vision, and HA. CT head showed chronic B/L frontal SDH. The rest of the workup was negative. CVA/TIA r/out.  Pt was discharged home but on 3/17 had apparent seizure.  She was admitted to [facility] and intubated and placed on depakote.  CTA H/N, MRI, EEG unremarkable.  ID Recommended LP for fever; family deferred.  Transferred to [facility] on 3/19/19.  MRI brain showed cerebral ischemia. The rest of the workup was negative, this including CSF studies. EEG neg seizures. (Evaluated by lymphoma specialist/onco, PET 4/5/19 r/o recurrence of lymphoma. Increased signal at base of tongue to follow up outpt ENT). Additionally, course complicated with pneumothorax, pulmonary consulted, no interventions, self resolved.  Tachycardia - per pulm, related related to chronic lung disease.  GI consulted for rectal pain; diagnosed c anal fissure/constipation, bowel regimen started. KUB 4/12/19 No stool, no free air, no distended loops of small bowel, fibroids. Refused further workup.   Other issues while admitted:  Hypotension - home diltiazem held per cardio  dizziness - sporadic, self resolving.  Pt deferred MRI.  May be 2/2 to hx of Meniere's disease.      *TTE 3/18/19 EF 60%, mod-severe MR.   MRI brain 3/29/19 cortically based restricted diffusion within R>L frontoparietal region as well as additional scattered small foci, may represent evolution of a recent ischemia vs improving encephalitis.     Patient medically optimized and cleared for discharge to acute rehab at [facility] on 4/19/19.        PAST MEDICAL & SURGICAL HISTORY:  Interstitial lung disease: on home o2 prn  NHL (non-Hodgkin's lymphoma): Agem 45 sp chemo/rt/stem cell  Transient cerebral ischemia, unspecified type  Mitral prolapse  History of tonsillectomy  History of appendectomy        REVIEW OF SYMPTOMS               [X] Resp WNL           [X] GI WNL                          [X]  WNL                   [X] Heme WNL              [X] Endo WNL                     [X] Skin WNL                  [X] Cognitive WNL              VITALS  Vital Signs Last 24 Hrs  T(C): 36.4 (22 Apr 2019 08:36), Max: 36.9 (21 Apr 2019 14:58)  T(F): 97.5 (22 Apr 2019 08:36), Max: 98.5 (21 Apr 2019 14:58)  HR: 130 (22 Apr 2019 08:36) (75 - 130)  BP: 111/77 (22 Apr 2019 08:36) (108/77 - 122/72)  BP(mean): --  RR: 16 (22 Apr 2019 08:36) (16 - 16)  SpO2: 97% (22 Apr 2019 08:36) (97% - 100%)      PHYSICAL EXAM  Constitutional - In Pain  HEENT - NCAT, EOMI  Neck - Supple, No limited ROM  Chest - No wheeze, No rhonchi, No crackles  Cardiovascular - RRR, S1S2, No murmurs  Abdomen - BS+, Soft, NTND  Extremities - No C/C/E, No calf tenderness   Skin-no rash  Wounds-na      Neurologic Exam -                     Balance - impaired     Psychiatric - Emotional, depressed         FUNCTIONAL PROGRESS  Gait - eval  ADLs - eval  Transfers - eval  Functional transfer - eval    RECENT LABS                Direct LDL: 109 mg/dL (03-13-19 @ 11:50)                RADIOLOGY/OTHER RESULTS      CURRENT MEDICATIONS  MEDICATIONS  (STANDING):  aspirin  chewable 81 milliGRAM(s) Oral daily  buDESOnide   0.25 milliGRAM(s) Respule 0.25 milliGRAM(s) Inhalation two times a day  cholecalciferol 2000 Unit(s) Oral daily  cyanocobalamin 1000 MICROGram(s) Oral daily  diVALproex  milliGRAM(s) Oral two times a day  heparin  Injectable 5000 Unit(s) SubCutaneous <User Schedule>  lidocaine   Patch 1 Patch Transdermal <User Schedule>  tiotropium 18 MICROgram(s) Capsule 1 Capsule(s) Inhalation daily  zinc oxide 20% Ointment 1 Application(s) Topical daily    MEDICATIONS  (PRN):  acetaminophen   Tablet .. 650 milliGRAM(s) Oral every 6 hours PRN Mild Pain (1 - 3)  ALPRAZolam 0.125 milliGRAM(s) Oral three times a day PRN Anxiety  hemorrhoidal Ointment 1 Application(s) Rectal four times a day PRN post-BM, pain  levalbuterol Inhalation 0.63 milliGRAM(s) Inhalation every 8 hours PRN bronchospasm  lidocaine 2% Gel 1 Application(s) Topical daily PRN pain  melatonin 3 milliGRAM(s) Oral at bedtime PRN Sleep  ondansetron   Disintegrating Tablet 4 milliGRAM(s) Oral every 6 hours PRN Nausea and/or Vomiting  simethicone 80 milliGRAM(s) Chew every 6 hours PRN Gas  traMADol 50 milliGRAM(s) Oral three times a day PRN Severe Pain (7 - 10)      ASSESSMENT & PLAN          GI/Bowel Management - simethicone   Management - Toilet Q2  Skin - Turn Q2  Pain - Tylenol PRN/tramadol/lidocaine  DVT PPX - TEDs, heparin sq  Diet - reg c thins    Continue comprehensive acute rehab program consisting of 3hrs/day of OT/PT and SLP. CHIEF COMPLAINT: Rectal pain, lower back pain, weakness.       HISTORY OF PRESENT ILLNESS  58 yo female with PMHx of MVP, chronic SDH, interstitial lung disease/pneumothorax, pulmonary nodules, meniere's, non hodgkins lymphoma (SCD/XRT/chemo at [facility] 2003), TIA, tachycardia, occipital neuralgia, and neuropathy.  Patient was initially admitted to [place] on 3/13/19 with acute onset of b/l UE weakness associated with nausea, blurry vision, and HA. CT head showed chronic B/L frontal SDH. The rest of the workup was negative. CVA/TIA r/out.  Pt was discharged home but on 3/17 had apparent seizure.  She was admitted to [facility] and intubated and placed on depakote.  CTA H/N, MRI, EEG unremarkable.  ID Recommended LP for fever; family deferred.  Transferred to [facility] on 3/19/19.  MRI brain showed cerebral ischemia. The rest of the workup was negative, this including CSF studies. EEG neg seizures. (Evaluated by lymphoma specialist/onco, PET 4/5/19 r/o recurrence of lymphoma. Increased signal at base of tongue to follow up outpt ENT). Additionally, course complicated with pneumothorax, pulmonary consulted, no interventions, self resolved.  Tachycardia - per pulm, related related to chronic lung disease.  GI consulted for rectal pain; diagnosed c anal fissure/constipation, bowel regimen started. KUB 4/12/19 No stool, no free air, no distended loops of small bowel, fibroids. Refused further workup.   Other issues while admitted:  Hypotension - home diltiazem held per cardio  dizziness - sporadic, self resolving.  Pt deferred MRI.  May be 2/2 to hx of Meniere's disease.      *TTE 3/18/19 EF 60%, mod-severe MR.   MRI brain 3/29/19 cortically based restricted diffusion within R>L frontoparietal region as well as additional scattered small foci, may represent evolution of a recent ischemia vs improving encephalitis.     Patient medically optimized and cleared for discharge to acute rehab at [facility] on 4/19/19.        PAST MEDICAL & SURGICAL HISTORY:  Interstitial lung disease: on home o2 prn  NHL (non-Hodgkin's lymphoma): Agem 43 sp chemo/rt/stem cell  Transient cerebral ischemia, unspecified type  Mitral prolapse  History of tonsillectomy  History of appendectomy        REVIEW OF SYMPTOMS                [X] GI WNL                          [X]  WNL                     [X] Endo WNL                     [X] Skin WNL                  [X] Cognitive WNL              VITALS  Vital Signs Last 24 Hrs  T(C): 36.4 (22 Apr 2019 08:36), Max: 36.9 (21 Apr 2019 14:58)  T(F): 97.5 (22 Apr 2019 08:36), Max: 98.5 (21 Apr 2019 14:58)  HR: 130 (22 Apr 2019 08:36) (75 - 130)  BP: 111/77 (22 Apr 2019 08:36) (108/77 - 122/72)  BP(mean): --  RR: 16 (22 Apr 2019 08:36) (16 - 16)  SpO2: 97% (22 Apr 2019 08:36) (97% - 100%)      PHYSICAL EXAM  Constitutional - In Pain  HEENT - NCAT, EOMI  Neck - Supple, No limited ROM  Chest - No wheeze, No rhonchi, No crackles  Cardiovascular - RRR, S1S2, No murmurs  Abdomen - BS+, Soft, NTND  Extremities - No C/C/E, No calf tenderness   Skin-no rash  Wounds-na      Neurologic Exam -                     Balance - impaired     Psychiatric - Emotional, depressed         FUNCTIONAL PROGRESS  Gait - eval  ADLs - eval  Transfers - eval  Functional transfer - eval    RECENT LABS                Direct LDL: 109 mg/dL (03-13-19 @ 11:50)                RADIOLOGY/OTHER RESULTS      CURRENT MEDICATIONS  MEDICATIONS  (STANDING):  aspirin  chewable 81 milliGRAM(s) Oral daily  buDESOnide   0.25 milliGRAM(s) Respule 0.25 milliGRAM(s) Inhalation two times a day  cholecalciferol 2000 Unit(s) Oral daily  cyanocobalamin 1000 MICROGram(s) Oral daily  diVALproex  milliGRAM(s) Oral two times a day  heparin  Injectable 5000 Unit(s) SubCutaneous <User Schedule>  lidocaine   Patch 1 Patch Transdermal <User Schedule>  tiotropium 18 MICROgram(s) Capsule 1 Capsule(s) Inhalation daily  zinc oxide 20% Ointment 1 Application(s) Topical daily    MEDICATIONS  (PRN):  acetaminophen   Tablet .. 650 milliGRAM(s) Oral every 6 hours PRN Mild Pain (1 - 3)  ALPRAZolam 0.125 milliGRAM(s) Oral three times a day PRN Anxiety  hemorrhoidal Ointment 1 Application(s) Rectal four times a day PRN post-BM, pain  levalbuterol Inhalation 0.63 milliGRAM(s) Inhalation every 8 hours PRN bronchospasm  lidocaine 2% Gel 1 Application(s) Topical daily PRN pain  melatonin 3 milliGRAM(s) Oral at bedtime PRN Sleep  ondansetron   Disintegrating Tablet 4 milliGRAM(s) Oral every 6 hours PRN Nausea and/or Vomiting  simethicone 80 milliGRAM(s) Chew every 6 hours PRN Gas  traMADol 50 milliGRAM(s) Oral three times a day PRN Severe Pain (7 - 10)      ASSESSMENT & PLAN          GI/Bowel Management - simethicone/colace/sitz bath   Management - Toilet Q2  Skin - Turn Q2  Pain - Tylenol PRN/tramadol/lidocaine  DVT PPX - TEDs, heparin sq  Diet - reg c thins    Continue comprehensive acute rehab program consisting of 3hrs/day of OT/PT and SLP. CHIEF COMPLAINT: Rectal pain, lower back pain, weakness.       HISTORY OF PRESENT ILLNESS  58 yo female with PMHx of MVP, chronic SDH, interstitial lung disease/pneumothorax, pulmonary nodules, meniere's, non hodgkins lymphoma (SCD/XRT/chemo at [facility] [place] 2003), TIA, tachycardia, occipital neuralgia, and neuropathy.  Patient was initially admitted to [place] on 3/13/19 with acute onset of b/l UE weakness associated with nausea, blurry vision, and HA. CT head showed chronic B/L frontal SDH. The rest of the workup was negative. CVA/TIA r/out.  Pt was discharged home but on 3/17 had apparent seizure.  She was admitted to [facility] and intubated and placed on depakote.  CTA H/N, MRI, EEG unremarkable.  ID Recommended LP for fever; family deferred.  Transferred to [facility] on 3/19/19.  MRI brain showed cerebral ischemia. The rest of the workup was negative, this including CSF studies. EEG neg seizures. (Evaluated by lymphoma specialist/onco, PET 4/5/19 r/o recurrence of lymphoma. Increased signal at base of tongue to follow up outpt ENT). Additionally, course complicated with pneumothorax, pulmonary consulted, no interventions, self resolved.  Tachycardia - per pulm, related related to chronic lung disease.  GI consulted for rectal pain; diagnosed c anal fissure/constipation, bowel regimen started. KUB 4/12/19 No stool, no free air, no distended loops of small bowel, fibroids. Refused further workup.   Other issues while admitted:  Hypotension - home diltiazem held per cardio  dizziness - sporadic, self resolving.  Pt deferred MRI.  May be 2/2 to hx of Meniere's disease.      *TTE 3/18/19 EF 60%, mod-severe MR.   MRI brain 3/29/19 cortically based restricted diffusion within R>L frontoparietal region as well as additional scattered small foci, may represent evolution of a recent ischemia vs improving encephalitis.     Patient medically optimized and cleared for discharge to acute rehab at [facility] on 4/19/19.        PAST MEDICAL & SURGICAL HISTORY:  Interstitial lung disease: on home o2 prn  NHL (non-Hodgkin's lymphoma): Agem 45 sp chemo/rt/stem cell  Transient cerebral ischemia, unspecified type  Mitral prolapse  History of tonsillectomy  History of appendectomy        REVIEW OF SYMPTOMS                [X] GI WNL                          [X]  WNL                     [X] Endo WNL                     [X] Skin WNL                  [X] Cognitive WNL              VITALS  Vital Signs Last 24 Hrs  T(C): 36.4 (22 Apr 2019 08:36), Max: 36.9 (21 Apr 2019 14:58)  T(F): 97.5 (22 Apr 2019 08:36), Max: 98.5 (21 Apr 2019 14:58)  HR: 130 (22 Apr 2019 08:36) (75 - 130)  BP: 111/77 (22 Apr 2019 08:36) (108/77 - 122/72)  BP(mean): --  RR: 16 (22 Apr 2019 08:36) (16 - 16)  SpO2: 97% (22 Apr 2019 08:36) (97% - 100%)      PHYSICAL EXAM  Constitutional - In Pain  HEENT - NCAT, EOMI  Neck - Supple, No limited ROM  Chest - No wheeze, No rhonchi, No crackles  Cardiovascular - RRR, S1S2, No murmurs  Abdomen - BS+, Soft, NTND  Extremities - No C/C/E, No calf tenderness   Skin-no rash  Wounds-na      Neurologic Exam -  AAO to person, place and time, VFF,    no dysphagia, dysarthria, spastic quadriparesis, more pronounced in left UE 1-2/5, right UE 3-4/5 and lower extremity 3-4/5 with poor effort now.  Impaired LT sensation in left UE.               Balance - impaired     Psychiatric - Emotional, depressed         FUNCTIONAL PROGRESS  Gait - eval  ADLs - eval  Transfers - eval  Functional transfer - eval    RECENT LABS                Direct LDL: 109 mg/dL (03-13-19 @ 11:50)                RADIOLOGY/OTHER RESULTS      CURRENT MEDICATIONS  MEDICATIONS  (STANDING):  aspirin  chewable 81 milliGRAM(s) Oral daily  buDESOnide   0.25 milliGRAM(s) Respule 0.25 milliGRAM(s) Inhalation two times a day  cholecalciferol 2000 Unit(s) Oral daily  cyanocobalamin 1000 MICROGram(s) Oral daily  diVALproex  milliGRAM(s) Oral two times a day  heparin  Injectable 5000 Unit(s) SubCutaneous <User Schedule>  lidocaine   Patch 1 Patch Transdermal <User Schedule>  tiotropium 18 MICROgram(s) Capsule 1 Capsule(s) Inhalation daily  zinc oxide 20% Ointment 1 Application(s) Topical daily    MEDICATIONS  (PRN):  acetaminophen   Tablet .. 650 milliGRAM(s) Oral every 6 hours PRN Mild Pain (1 - 3)  ALPRAZolam 0.125 milliGRAM(s) Oral three times a day PRN Anxiety  hemorrhoidal Ointment 1 Application(s) Rectal four times a day PRN post-BM, pain  levalbuterol Inhalation 0.63 milliGRAM(s) Inhalation every 8 hours PRN bronchospasm  lidocaine 2% Gel 1 Application(s) Topical daily PRN pain  melatonin 3 milliGRAM(s) Oral at bedtime PRN Sleep  ondansetron   Disintegrating Tablet 4 milliGRAM(s) Oral every 6 hours PRN Nausea and/or Vomiting  simethicone 80 milliGRAM(s) Chew every 6 hours PRN Gas  traMADol 50 milliGRAM(s) Oral three times a day PRN Severe Pain (7 - 10)      ASSESSMENT & PLAN          GI/Bowel Management - simethicone/colace/sitz bath   Management - Toilet Q2  Skin - Turn Q2  Pain - Tylenol PRN/tramadol/lidocaine  DVT PPX - TEDs, heparin sq  Diet - reg c thins    Continue comprehensive acute rehab program consisting of 3hrs/day of OT/PT and SLP.

## 2019-04-22 NOTE — PROGRESS NOTE ADULT - ASSESSMENT
58 yo female s/p suspected CVA vs. encephalitis admitted to rehab with left-sided weakness and functional deficits.       #r/o CVA  ASA    #tachycardia  -baseline, monitor    #ILD  -pulmicort, xopenex, spiriva  -on home O2 PRN. may resume PRN    #Seizure  Continue Depakote, seizure ppx    #diarrhea  -stop bowel meds   -nutrition consult for food preferences   -zinc oxide for dermatitis    Precautions:      fall, seizures                                                                            Diet: reg    DVT Prophylaxis:          Lovenox                                                                Medical Prognosis:   fair    Prescreen Comparison: I have reviewed the prescreen information and I found no relevant changes between the preadmission  screening and my post admission evaluation.     Expected Therapy:   P.T.    1    hrs/day           O. T.   1   hrs/day           S.L.P.    1    hrs/day                    P&O    eval                                               Excpected Frequency: 5 days/7 day period    Rehab Potential:                good                           Estimated Disposition:       home                   ELOS:      14        days      Rationale For Inpatient Rehab Admission- Patient demonstrates the following:     [X] Medically appropriate for rehabilitation admission   [X] Has attainable rehab goals with an approrpriate discharge plan  [X] Has rehabilitation potential (expected to make significant improvement within a reasonable period of time)  [X] Requires close medical management by a rehab physician, rehab nursing care and comprehensive interdisciplinary team (including         PT, OT, SLP and/or prosthetics and orthotics) 60 yo female s/p suspected CVA vs. encephalitis admitted to rehab with left-sided weakness and functional deficits.       #r/o CVA  continue ASA as per plan.     #tachycardia  -off Cardizem 2/2 hypotension at [place]. HR 130s-140 in therapy, EKGx1. Cardiology evaluation. CXR.     #ILD  -pulmicort, xopenex, spiriva  -Start O2 PRN. Follow up CXR (c known Hx of recent pneumothorax c tachycardia).      #Seizure  Continue Depakote, Seizure ppx. Follow up Depakote levels.     #diarrhea  -colace/monitor bowel frequency  -zinc oxide for dermatitis    #rectal pain  r/o anal fissure/hemorrhoids. GI evaluation. colace/sitz bath. Tramadol prn.     #Hx NHL  hematology evaluation to Dr Andujar, follow labs    #chronic lower back pain  Lidocaine patch/Tylenol/tramadol prn        Precautions:      fall, seizures                                                                            Diet: reg    DVT Prophylaxis:          Lovenox                                                                Medical Prognosis:   fair    Prescreen Comparison: I have reviewed the prescreen information and I found no relevant changes between the preadmission  screening and my post admission evaluation.     Expected Therapy:   P.T.    1    hrs/day           O. T.   1   hrs/day           S.L.P.    1    hrs/day                    P&O    eval                                               Excpected Frequency: 5 days/7 day period    Rehab Potential:                good                           Estimated Disposition:       home                   ELOS:      14        days      Rationale For Inpatient Rehab Admission- Patient demonstrates the following:     [X] Medically appropriate for rehabilitation admission   [X] Has attainable rehab goals with an approrpriate discharge plan  [X] Has rehabilitation potential (expected to make significant improvement within a reasonable period of time)  [X] Requires close medical management by a rehab physician, rehab nursing care and comprehensive interdisciplinary team (including         PT, OT, SLP and/or prosthetics and orthotics) 60 yo female s/p suspected CVA vs. encephalitis admitted to rehab with left-sided weakness > right sided weakness  and functional deficits.       #r/o CVA  continue ASA as per plan.     #tachycardia  -off Cardizem 2/2 hypotension at [place]. HR 130s-140 in therapy, EKGx1. Cardiology evaluation. CXR.     #ILD  -pulmicort, xopenex, spiriva  -Start O2 PRN. Follow up CXR (c known Hx of recent pneumothorax c tachycardia). Will ask Pulmonary to evaluate     #Seizure  Continue Depakote, Seizure ppx. Follow up Depakote levels.     #diarrhea  -colace/monitor bowel frequency  -zinc oxide for dermatitis    #rectal pain  r/o anal fissure/hemorrhoids. GI evaluation. colace/sitz bath. Tramadol prn.     #Hx NHL  hematology evaluation to Dr Andujar, follow labs    #chronic lower back pain  Lidocaine patch/Tylenol/tramadol prn        Precautions:      fall, seizures                                                                            Diet: reg    DVT Prophylaxis:          Lovenox                                                                Medical Prognosis:   fair

## 2019-04-22 NOTE — PROGRESS NOTE ADULT - SUBJECTIVE AND OBJECTIVE BOX
Patient is a 59y old  Female who presents with a chief complaint of s/p suspected CVA vs encephalitis, now c left sided weakness and functional deficits. (22 Apr 2019 13:21)    Anxious.     Patient seen and examined at bedside.    ALLERGIES:  aspirin (Other (Unknown))  IV Contrast (Anaphylaxis)  shellfish. (Anaphylaxis)    MEDICATIONS  (STANDING):  aspirin  chewable 81 milliGRAM(s) Oral daily  buDESOnide   0.25 milliGRAM(s) Respule 0.25 milliGRAM(s) Inhalation two times a day  cholecalciferol 2000 Unit(s) Oral daily  cyanocobalamin 1000 MICROGram(s) Oral daily  diVALproex  milliGRAM(s) Oral two times a day  heparin  Injectable 5000 Unit(s) SubCutaneous <User Schedule>  lidocaine   Patch 1 Patch Transdermal <User Schedule>  tiotropium 18 MICROgram(s) Capsule 1 Capsule(s) Inhalation daily  zinc oxide 20% Ointment 1 Application(s) Topical daily    MEDICATIONS  (PRN):  acetaminophen   Tablet .. 650 milliGRAM(s) Oral every 6 hours PRN Mild Pain (1 - 3)  ALPRAZolam 0.125 milliGRAM(s) Oral three times a day PRN Anxiety  hemorrhoidal Ointment 1 Application(s) Rectal four times a day PRN post-BM, pain  levalbuterol Inhalation 0.63 milliGRAM(s) Inhalation every 8 hours PRN bronchospasm  lidocaine 2% Gel 1 Application(s) Topical daily PRN pain  melatonin 3 milliGRAM(s) Oral at bedtime PRN Sleep  ondansetron   Disintegrating Tablet 4 milliGRAM(s) Oral every 6 hours PRN Nausea and/or Vomiting  simethicone 80 milliGRAM(s) Chew every 6 hours PRN Gas  traMADol 50 milliGRAM(s) Oral three times a day PRN Severe Pain (7 - 10)    Vital Signs Last 24 Hrs  T(F): 97.5 (22 Apr 2019 08:36), Max: 98.5 (21 Apr 2019 14:58)  HR: 130 (22 Apr 2019 08:36) (75 - 130)  BP: 111/77 (22 Apr 2019 08:36) (108/77 - 122/72)  RR: 16 (22 Apr 2019 08:36) (16 - 16)  SpO2: 97% (22 Apr 2019 08:36) (97% - 100%)  I&O's Summary    BMI (kg/m2): 18.4 (04-19-19 @ 18:25)  PHYSICAL EXAM:  General: NAD, A/O x 3  ENT: MMM  Neck: Supple, No JVD  Lungs: Clear to auscultation bilaterally  Cardio: RRR, S1/S2, No murmurs  Abdomen: Soft, Nontender, Nondistended; Bowel sounds present  Extremities: No calf tenderness, No pitting edema    LABS:             11.4   8.4   )-----------( 412      ( 20 Apr 2019 06:11 )             35.5       04-20    140  |  103  |  21  ----------------------------<  91  4.5   |  28  |  0.69    Ca    9.3      20 Apr 2019 06:11    TPro  6.3  /  Alb  2.5  /  TBili  0.2  /  DBili  x   /  AST  30  /  ALT  19  /  AlkPhos  82  04-20     eGFR if Non African American: 94 mL/min/1.73M2 (04-20-19 @ 06:11)  eGFR if : 109 mL/min/1.73M2 (04-20-19 @ 06:11)    03-13 Chol 193 mg/dL  mg/dL HDL 62 mg/dL Trig 112 mg/dL    CAPILLARY BLOOD GLUCOSE    RADIOLOGY & ADDITIONAL TESTS:    Care Discussed with Consultants/Other Providers:

## 2019-04-22 NOTE — PROGRESS NOTE ADULT - ASSESSMENT
59W s/p suspected CVA vs. encephalitis admitted to rehab with left-sided weakness and functional deficits.     CVA: per neurology/REHAB TEAM. on ASA  - start statin for primary stroke prevention  - on asa but with allergy to asa?   - monitor BP    Seizure   - depakote     Tachycardia  - per records secondary to restrictive lung disease  - check x-ray to rule out worsening pneumothorax  - may need ambulatory pulse oximetry given history of lung disease    Anemia of chronic disease  - stable     Thrombocytopenia  - stable 59W s/p suspected CVA vs. encephalitis admitted to rehab with left-sided weakness and functional deficits.     CVA: per neurology/REHAB TEAM. on ASA  - start statin for primary stroke prevention  - on asa but with allergy to asa?   - monitor BP    Seizure   - depakote     Tachycardia  - per records secondary to restrictive lung disease  - patient is on 2 L NC at home PRN? per daughter  - She apparently will spike to 130-140s at home and that's why she was previously on cardizem however her relative hypotension precludes her from adding it back currently  - check x-ray to rule out worsening pneumothorax  - consider oxygen when doing PT/OT    Anemia of chronic disease  - stable     Thrombocytopenia  - stable

## 2019-04-22 NOTE — CONSULT NOTE ADULT - SUBJECTIVE AND OBJECTIVE BOX
Chief Complaint:  for rehab    HPI: 59 yr old woman with a hx of according to her daughter undiagnosed pulmonary disease, hx of lymphoma in the past , hx of undiagnosed neurologic disorder, hx of tachycardia ,hx of at least mild to moderate but not severe mitral insufficiency, hx of small pneumothorax, hx of painful hemorrhoids now in Rehab. there is a concern due to tachycardia both at rest and with rehab. patient has had Pulmonary embolus ruled out at [facility] during the time she has had this. She caitlyn an electrophysiologist in [place] who according to patient recommended Karolineer, but this was never started. She had been on Diltiazem for a time with questionable efficacy but this was stopped due to hypotension. Patient has no cardiac symptoms at this time. She states she does at times wheeze    PMH:   Interstitial lung disease  NHL (non-Hodgkin's lymphoma)  Transient cerebral ischemia, unspecified type  Mitral prolapse  Pulmonary disease    PSH:   History of tonsillectomy  History of appendectomy    Family History:  FAMILY HISTORY:  Family history of stroke  Family history of breast cancer: Mother      Social History:  Smokin ppd x20yrs. stopped 20 yrs ago  Alcohol:no  Drugs:no    Allergies:  aspirin (Other (Unknown))  IV Contrast (Anaphylaxis)  shellfish. (Anaphylaxis)      Medications:  acetaminophen   Tablet .. 650 milliGRAM(s) Oral every 6 hours PRN  ALPRAZolam 0.125 milliGRAM(s) Oral three times a day PRN  aspirin  chewable 81 milliGRAM(s) Oral daily  buDESOnide   0.25 milliGRAM(s) Respule 0.25 milliGRAM(s) Inhalation two times a day  cholecalciferol 2000 Unit(s) Oral daily  cyanocobalamin 1000 MICROGram(s) Oral daily  diVALproex  milliGRAM(s) Oral two times a day  hemorrhoidal Ointment 1 Application(s) Rectal four times a day PRN  heparin  Injectable 5000 Unit(s) SubCutaneous <User Schedule>  levalbuterol Inhalation 0.63 milliGRAM(s) Inhalation every 8 hours PRN  lidocaine   Patch 1 Patch Transdermal <User Schedule>  lidocaine 2% Gel 1 Application(s) Topical daily PRN  melatonin 3 milliGRAM(s) Oral at bedtime PRN  ondansetron   Disintegrating Tablet 4 milliGRAM(s) Oral every 6 hours PRN  simethicone 80 milliGRAM(s) Chew every 6 hours PRN  tiotropium 18 MICROgram(s) Capsule 1 Capsule(s) Inhalation daily  traMADol 50 milliGRAM(s) Oral three times a day PRN  zinc oxide 20% Ointment 1 Application(s) Topical daily      REVIEW OF SYSTEMS:  CONSTITUTIONAL: No fever, weight loss, or fatigue  EYES: No eye pain, visual disturbances, or discharge  ENMT:  No difficulty hearing, tinnitus, vertigo; No sinus or throat pain  NECK: No pain or stiffness  BREASTS: No pain, masses, or nipple discharge  RESPIRATORY: see hpi  CARDIOVASCULAR:see hpi  GASTROINTESTINAL: No abdominal or epigastric pain. No nausea, vomiting, or hematemesis; No diarrhea or constipation. No melena or hematochezia.  GENITOURINARY: No dysuria, frequency, hematuria, or incontinence  NEUROLOGICAL: see hpi  SKIN: No itching, burning, rashes, or lesions   LYMPH NODES: No enlarged glands  ENDOCRINE: No heat or cold intolerance; No hair loss  MUSCULOSKELETAL: No joint pain or swelling; No muscle, back, or extremity pain  PSYCHIATRIC: No depression, anxiety, mood swings, or difficulty sleeping  HEME/LYMPH: No easy bruising, or bleeding gums  ALLERY AND IMMUNOLOGIC: No hives or eczema    Physical Exam:  T(C): 36.4 (19 @ 08:36), Max: 36.9 (19 @ 14:58)  HR: 130 (19 @ 08:36) (75 - 130)  BP: 111/77 (19 @ 08:36) (108/77 - 122/72)  RR: 16 (19 @ 08:36) (16 - 16)  SpO2: 97% (19 @ 08:36) (97% - 100%)  Wt(kg): --    GENERAL: NAD, well-groomed, well-developed  HEAD:  Atraumatic, Normocephalic  EYES: EOMI, conjunctiva and sclera clear  ENT: Moist mucous membranes,  NECK: Supple, No JVD, no bruits  CHEST/LUNG: Clear to percussion bilaterally; No rales, rhonchi, wheezing, or rubs  HEART: Regular rate and rhythm; No murmurs, rubs, or gallops PMI non displaced.  ABDOMEN: Soft, Nontender, Nondistended; Bowel sounds present  EXTREMITIES:  2+ Peripheral Pulses, No clubbing, cyanosis, or edema  SKIN: No rashes or lesions  NERVOUS SYSTEM:  Alert & Oriented X3, Good concentration; Motor Strength 5/5 B/L upper and lower extremities; DTRs 2+ intact and symmetric    Cardiovascular Diagnostic Testing:  ECG:sinus tach no acute abnormalities    Labs:                          Imaging:

## 2019-04-22 NOTE — CONSULT NOTE ADULT - ADDITIONAL PE
Started interviewing and examining patient however after a few minutes patient stated she had back pain and refused examination. I offered to come back.  After coming back an hour later, patient was very drowsy and unable to speak much.  Unable to complete exam will try again tomorrow.

## 2019-04-23 ENCOUNTER — TRANSCRIPTION ENCOUNTER (OUTPATIENT)
Age: 59
End: 2019-04-23

## 2019-04-23 ENCOUNTER — INPATIENT (INPATIENT)
Facility: HOSPITAL | Age: 59
LOS: 15 days | Discharge: NURSING, REHAB OR LONG-TERM CARE | DRG: 377 | End: 2019-05-09
Attending: HOSPITALIST | Admitting: INTERNAL MEDICINE
Payer: MEDICARE

## 2019-04-23 VITALS
RESPIRATION RATE: 14 BRPM | SYSTOLIC BLOOD PRESSURE: 105 MMHG | OXYGEN SATURATION: 98 % | DIASTOLIC BLOOD PRESSURE: 66 MMHG

## 2019-04-23 VITALS
OXYGEN SATURATION: 99 % | HEIGHT: 69 IN | DIASTOLIC BLOOD PRESSURE: 63 MMHG | HEART RATE: 57 BPM | TEMPERATURE: 97 F | WEIGHT: 127.43 LBS | SYSTOLIC BLOOD PRESSURE: 93 MMHG | RESPIRATION RATE: 15 BRPM

## 2019-04-23 DIAGNOSIS — K62.5 HEMORRHAGE OF ANUS AND RECTUM: ICD-10-CM

## 2019-04-23 DIAGNOSIS — K51.811 OTHER ULCERATIVE COLITIS WITH RECTAL BLEEDING: ICD-10-CM

## 2019-04-23 DIAGNOSIS — Z90.49 ACQUIRED ABSENCE OF OTHER SPECIFIED PARTS OF DIGESTIVE TRACT: Chronic | ICD-10-CM

## 2019-04-23 DIAGNOSIS — R56.9 UNSPECIFIED CONVULSIONS: ICD-10-CM

## 2019-04-23 DIAGNOSIS — Z90.89 ACQUIRED ABSENCE OF OTHER ORGANS: Chronic | ICD-10-CM

## 2019-04-23 DIAGNOSIS — J84.9 INTERSTITIAL PULMONARY DISEASE, UNSPECIFIED: ICD-10-CM

## 2019-04-23 LAB
ABO RH CONFIRMATION: SIGNIFICANT CHANGE UP
ALBUMIN SERPL ELPH-MCNC: 2.6 G/DL — LOW (ref 3.3–5)
ALP SERPL-CCNC: 74 U/L — SIGNIFICANT CHANGE UP (ref 40–120)
ALT FLD-CCNC: 19 U/L DA — SIGNIFICANT CHANGE UP (ref 10–45)
ANION GAP SERPL CALC-SCNC: 8 MMOL/L — SIGNIFICANT CHANGE UP (ref 5–17)
ANION GAP SERPL CALC-SCNC: 9 MMOL/L — SIGNIFICANT CHANGE UP (ref 5–17)
ANION GAP SERPL CALC-SCNC: 9 MMOL/L — SIGNIFICANT CHANGE UP (ref 5–17)
APTT BLD: 31.2 SEC — SIGNIFICANT CHANGE UP (ref 27.5–36.3)
AST SERPL-CCNC: 21 U/L — SIGNIFICANT CHANGE UP (ref 10–40)
BILIRUB SERPL-MCNC: 0.2 MG/DL — SIGNIFICANT CHANGE UP (ref 0.2–1.2)
BUN SERPL-MCNC: 16 MG/DL — SIGNIFICANT CHANGE UP (ref 7–23)
BUN SERPL-MCNC: 20 MG/DL — SIGNIFICANT CHANGE UP (ref 7–23)
BUN SERPL-MCNC: 27 MG/DL — HIGH (ref 7–23)
CALCIUM SERPL-MCNC: 8.3 MG/DL — LOW (ref 8.4–10.5)
CALCIUM SERPL-MCNC: 9.3 MG/DL — SIGNIFICANT CHANGE UP (ref 8.4–10.5)
CALCIUM SERPL-MCNC: 9.4 MG/DL — SIGNIFICANT CHANGE UP (ref 8.4–10.5)
CHLORIDE SERPL-SCNC: 101 MMOL/L — SIGNIFICANT CHANGE UP (ref 96–108)
CHLORIDE SERPL-SCNC: 102 MMOL/L — SIGNIFICANT CHANGE UP (ref 96–108)
CHLORIDE SERPL-SCNC: 105 MMOL/L — SIGNIFICANT CHANGE UP (ref 96–108)
CO2 SERPL-SCNC: 27 MMOL/L — SIGNIFICANT CHANGE UP (ref 22–31)
CO2 SERPL-SCNC: 29 MMOL/L — SIGNIFICANT CHANGE UP (ref 22–31)
CO2 SERPL-SCNC: 30 MMOL/L — SIGNIFICANT CHANGE UP (ref 22–31)
CREAT SERPL-MCNC: 0.66 MG/DL — SIGNIFICANT CHANGE UP (ref 0.5–1.3)
CREAT SERPL-MCNC: 0.71 MG/DL — SIGNIFICANT CHANGE UP (ref 0.5–1.3)
CREAT SERPL-MCNC: 0.79 MG/DL — SIGNIFICANT CHANGE UP (ref 0.5–1.3)
FERRITIN SERPL-MCNC: 61 NG/ML — SIGNIFICANT CHANGE UP (ref 15–150)
FOLATE SERPL-MCNC: 17.2 NG/ML — SIGNIFICANT CHANGE UP
GLUCOSE BLDC GLUCOMTR-MCNC: 94 MG/DL — SIGNIFICANT CHANGE UP (ref 70–99)
GLUCOSE SERPL-MCNC: 101 MG/DL — HIGH (ref 70–99)
GLUCOSE SERPL-MCNC: 110 MG/DL — HIGH (ref 70–99)
GLUCOSE SERPL-MCNC: 95 MG/DL — SIGNIFICANT CHANGE UP (ref 70–99)
HCT VFR BLD CALC: 31.3 % — LOW (ref 34.5–45)
HCT VFR BLD CALC: 31.5 % — LOW (ref 34.5–45)
HCT VFR BLD CALC: 32.6 % — LOW (ref 34.5–45)
HCT VFR BLD CALC: 34.2 % — LOW (ref 34.5–45)
HGB BLD-MCNC: 10 G/DL — LOW (ref 11.5–15.5)
HGB BLD-MCNC: 11 G/DL — LOW (ref 11.5–15.5)
HGB BLD-MCNC: 11.2 G/DL — LOW (ref 11.5–15.5)
HGB BLD-MCNC: 9.9 G/DL — LOW (ref 11.5–15.5)
INR BLD: 0.95 RATIO — SIGNIFICANT CHANGE UP (ref 0.88–1.16)
INR BLD: 0.97 RATIO — SIGNIFICANT CHANGE UP (ref 0.88–1.16)
IRON SATN MFR SERPL: 34 UG/DL — SIGNIFICANT CHANGE UP (ref 30–160)
IRON SATN MFR SERPL: 9 % — LOW (ref 14–50)
LACTATE SERPL-SCNC: 0.7 MMOL/L — SIGNIFICANT CHANGE UP (ref 0.7–2)
LACTATE SERPL-SCNC: 1.4 MMOL/L — SIGNIFICANT CHANGE UP (ref 0.7–2)
LDH SERPL L TO P-CCNC: 188 U/L — SIGNIFICANT CHANGE UP (ref 50–242)
MAGNESIUM SERPL-MCNC: 1.7 MG/DL — SIGNIFICANT CHANGE UP (ref 1.6–2.6)
MAGNESIUM SERPL-MCNC: 1.9 MG/DL — SIGNIFICANT CHANGE UP (ref 1.6–2.6)
MCHC RBC-ENTMCNC: 30.8 PG — SIGNIFICANT CHANGE UP (ref 27–34)
MCHC RBC-ENTMCNC: 30.9 PG — SIGNIFICANT CHANGE UP (ref 27–34)
MCHC RBC-ENTMCNC: 31.1 PG — SIGNIFICANT CHANGE UP (ref 27–34)
MCHC RBC-ENTMCNC: 31.8 GM/DL — LOW (ref 32–36)
MCHC RBC-ENTMCNC: 32.7 GM/DL — SIGNIFICANT CHANGE UP (ref 32–36)
MCHC RBC-ENTMCNC: 33.8 GM/DL — SIGNIFICANT CHANGE UP (ref 32–36)
MCV RBC AUTO: 92 FL — SIGNIFICANT CHANGE UP (ref 80–100)
MCV RBC AUTO: 94.5 FL — SIGNIFICANT CHANGE UP (ref 80–100)
MCV RBC AUTO: 96.7 FL — SIGNIFICANT CHANGE UP (ref 80–100)
PHOSPHATE SERPL-MCNC: 3.7 MG/DL — SIGNIFICANT CHANGE UP (ref 2.5–4.5)
PHOSPHATE SERPL-MCNC: 4 MG/DL — SIGNIFICANT CHANGE UP (ref 2.5–4.5)
PLATELET # BLD AUTO: 273 K/UL — SIGNIFICANT CHANGE UP (ref 150–400)
PLATELET # BLD AUTO: 372 K/UL — SIGNIFICANT CHANGE UP (ref 150–400)
PLATELET # BLD AUTO: 384 K/UL — SIGNIFICANT CHANGE UP (ref 150–400)
POTASSIUM SERPL-MCNC: 4.2 MMOL/L — SIGNIFICANT CHANGE UP (ref 3.5–5.3)
POTASSIUM SERPL-MCNC: 4.3 MMOL/L — SIGNIFICANT CHANGE UP (ref 3.5–5.3)
POTASSIUM SERPL-MCNC: 4.5 MMOL/L — SIGNIFICANT CHANGE UP (ref 3.5–5.3)
POTASSIUM SERPL-SCNC: 4.2 MMOL/L — SIGNIFICANT CHANGE UP (ref 3.5–5.3)
POTASSIUM SERPL-SCNC: 4.3 MMOL/L — SIGNIFICANT CHANGE UP (ref 3.5–5.3)
POTASSIUM SERPL-SCNC: 4.5 MMOL/L — SIGNIFICANT CHANGE UP (ref 3.5–5.3)
PROT SERPL-MCNC: 6.3 G/DL — SIGNIFICANT CHANGE UP (ref 6–8.3)
PROTHROM AB SERPL-ACNC: 10.6 SEC — SIGNIFICANT CHANGE UP (ref 10–12.9)
PROTHROM AB SERPL-ACNC: 10.8 SEC — SIGNIFICANT CHANGE UP (ref 10–12.9)
RBC # BLD: 3.26 M/UL — LOW (ref 3.8–5.2)
RBC # BLD: 3.54 M/UL — LOW (ref 3.8–5.2)
RBC # BLD: 3.62 M/UL — LOW (ref 3.8–5.2)
RBC # FLD: 14 % — SIGNIFICANT CHANGE UP (ref 10.3–14.5)
RBC # FLD: 14.2 % — SIGNIFICANT CHANGE UP (ref 10.3–14.5)
RBC # FLD: 14.3 % — SIGNIFICANT CHANGE UP (ref 10.3–14.5)
SODIUM SERPL-SCNC: 140 MMOL/L — SIGNIFICANT CHANGE UP (ref 135–145)
TIBC SERPL-MCNC: 365 UG/DL — SIGNIFICANT CHANGE UP (ref 220–430)
UIBC SERPL-MCNC: 331 UG/DL — SIGNIFICANT CHANGE UP (ref 110–370)
VALPROATE SERPL-MCNC: 60 UG/ML — SIGNIFICANT CHANGE UP (ref 50–100)
VIT B12 SERPL-MCNC: 1338 PG/ML — HIGH (ref 232–1245)
WBC # BLD: 11.1 K/UL — HIGH (ref 3.8–10.5)
WBC # BLD: 11.2 K/UL — HIGH (ref 3.8–10.5)
WBC # BLD: 8.7 K/UL — SIGNIFICANT CHANGE UP (ref 3.8–10.5)
WBC # FLD AUTO: 11.1 K/UL — HIGH (ref 3.8–10.5)
WBC # FLD AUTO: 11.2 K/UL — HIGH (ref 3.8–10.5)
WBC # FLD AUTO: 8.7 K/UL — SIGNIFICANT CHANGE UP (ref 3.8–10.5)

## 2019-04-23 PROCEDURE — 99233 SBSQ HOSP IP/OBS HIGH 50: CPT

## 2019-04-23 PROCEDURE — 93010 ELECTROCARDIOGRAM REPORT: CPT

## 2019-04-23 PROCEDURE — 99232 SBSQ HOSP IP/OBS MODERATE 35: CPT

## 2019-04-23 PROCEDURE — 99223 1ST HOSP IP/OBS HIGH 75: CPT

## 2019-04-23 RX ORDER — DIVALPROEX SODIUM 500 MG/1
1 TABLET, DELAYED RELEASE ORAL
Qty: 0 | Refills: 0 | COMMUNITY
Start: 2019-04-23

## 2019-04-23 RX ORDER — IPRATROPIUM/ALBUTEROL SULFATE 18-103MCG
3 AEROSOL WITH ADAPTER (GRAM) INHALATION EVERY 6 HOURS
Qty: 0 | Refills: 0 | Status: DISCONTINUED | OUTPATIENT
Start: 2019-04-23 | End: 2019-05-09

## 2019-04-23 RX ORDER — SODIUM CHLORIDE 9 MG/ML
1000 INJECTION, SOLUTION INTRAVENOUS
Qty: 0 | Refills: 0 | Status: DISCONTINUED | OUTPATIENT
Start: 2019-04-23 | End: 2019-04-23

## 2019-04-23 RX ORDER — BUDESONIDE, MICRONIZED 100 %
0 POWDER (GRAM) MISCELLANEOUS
Qty: 0 | Refills: 0 | DISCHARGE
Start: 2019-04-23

## 2019-04-23 RX ORDER — BUDESONIDE, MICRONIZED 100 %
0.5 POWDER (GRAM) MISCELLANEOUS
Qty: 0 | Refills: 0 | Status: DISCONTINUED | OUTPATIENT
Start: 2019-04-23 | End: 2019-05-05

## 2019-04-23 RX ORDER — PHENYLEPHRINE-SHARK LIVER OIL-MINERAL OIL-PETROLATUM RECTAL OINTMENT
1 OINTMENT (GRAM) RECTAL
Qty: 0 | Refills: 0 | COMMUNITY
Start: 2019-04-23

## 2019-04-23 RX ORDER — LIDOCAINE 4 G/100G
0 CREAM TOPICAL
Qty: 0 | Refills: 0 | COMMUNITY
Start: 2019-04-23

## 2019-04-23 RX ORDER — ALPRAZOLAM 0.25 MG
0.5 TABLET ORAL THREE TIMES A DAY
Qty: 0 | Refills: 0 | Status: DISCONTINUED | OUTPATIENT
Start: 2019-04-23 | End: 2019-04-23

## 2019-04-23 RX ORDER — CHOLECALCIFEROL (VITAMIN D3) 125 MCG
2000 CAPSULE ORAL
Qty: 0 | Refills: 0 | DISCHARGE
Start: 2019-04-23

## 2019-04-23 RX ORDER — PANTOPRAZOLE SODIUM 20 MG/1
40 TABLET, DELAYED RELEASE ORAL EVERY 12 HOURS
Qty: 0 | Refills: 0 | Status: DISCONTINUED | OUTPATIENT
Start: 2019-04-23 | End: 2019-05-09

## 2019-04-23 RX ORDER — ACETAMINOPHEN 500 MG
650 TABLET ORAL EVERY 6 HOURS
Qty: 0 | Refills: 0 | Status: DISCONTINUED | OUTPATIENT
Start: 2019-04-23 | End: 2019-05-09

## 2019-04-23 RX ORDER — ONDANSETRON 8 MG/1
4 TABLET, FILM COATED ORAL ONCE
Qty: 0 | Refills: 0 | Status: COMPLETED | OUTPATIENT
Start: 2019-04-23 | End: 2019-04-23

## 2019-04-23 RX ORDER — PANTOPRAZOLE SODIUM 20 MG/1
40 TABLET, DELAYED RELEASE ORAL EVERY 12 HOURS
Qty: 0 | Refills: 0 | Status: DISCONTINUED | OUTPATIENT
Start: 2019-04-23 | End: 2019-04-23

## 2019-04-23 RX ORDER — FENTANYL CITRATE 50 UG/ML
25 INJECTION INTRAVENOUS ONCE
Qty: 0 | Refills: 0 | Status: DISCONTINUED | OUTPATIENT
Start: 2019-04-23 | End: 2019-04-23

## 2019-04-23 RX ORDER — ONDANSETRON 8 MG/1
1 TABLET, FILM COATED ORAL
Qty: 0 | Refills: 0 | DISCHARGE
Start: 2019-04-23

## 2019-04-23 RX ORDER — METOPROLOL TARTRATE 50 MG
12.5 TABLET ORAL
Qty: 0 | Refills: 0 | Status: DISCONTINUED | OUTPATIENT
Start: 2019-04-23 | End: 2019-04-23

## 2019-04-23 RX ORDER — SODIUM CHLORIDE 9 MG/ML
1000 INJECTION, SOLUTION INTRAVENOUS
Qty: 0 | Refills: 0 | Status: DISCONTINUED | OUTPATIENT
Start: 2019-04-23 | End: 2019-04-25

## 2019-04-23 RX ORDER — ACETAMINOPHEN 500 MG
650 TABLET ORAL ONCE
Qty: 0 | Refills: 0 | Status: COMPLETED | OUTPATIENT
Start: 2019-04-23 | End: 2019-04-23

## 2019-04-23 RX ORDER — LEVALBUTEROL 1.25 MG/.5ML
3 SOLUTION, CONCENTRATE RESPIRATORY (INHALATION)
Qty: 0 | Refills: 0 | COMMUNITY
Start: 2019-04-23

## 2019-04-23 RX ORDER — TRAMADOL HYDROCHLORIDE 50 MG/1
50 TABLET ORAL THREE TIMES A DAY
Qty: 0 | Refills: 0 | Status: DISCONTINUED | OUTPATIENT
Start: 2019-04-23 | End: 2019-04-23

## 2019-04-23 RX ORDER — ACETAMINOPHEN 500 MG
2 TABLET ORAL
Qty: 0 | Refills: 0 | DISCHARGE
Start: 2019-04-23

## 2019-04-23 RX ORDER — LIDOCAINE 4 G/100G
1 CREAM TOPICAL DAILY
Qty: 0 | Refills: 0 | Status: DISCONTINUED | OUTPATIENT
Start: 2019-04-23 | End: 2019-05-09

## 2019-04-23 RX ORDER — ACETAMINOPHEN 500 MG
325 TABLET ORAL ONCE
Qty: 0 | Refills: 0 | Status: DISCONTINUED | OUTPATIENT
Start: 2019-04-23 | End: 2019-04-23

## 2019-04-23 RX ORDER — LANOLIN ALCOHOL/MO/W.PET/CERES
3 CREAM (GRAM) TOPICAL AT BEDTIME
Qty: 0 | Refills: 0 | Status: DISCONTINUED | OUTPATIENT
Start: 2019-04-23 | End: 2019-04-23

## 2019-04-23 RX ORDER — PREGABALIN 225 MG/1
1000 CAPSULE ORAL DAILY
Qty: 0 | Refills: 0 | Status: DISCONTINUED | OUTPATIENT
Start: 2019-04-23 | End: 2019-04-23

## 2019-04-23 RX ORDER — CHOLECALCIFEROL (VITAMIN D3) 125 MCG
2000 CAPSULE ORAL DAILY
Qty: 0 | Refills: 0 | Status: DISCONTINUED | OUTPATIENT
Start: 2019-04-23 | End: 2019-04-23

## 2019-04-23 RX ORDER — ALPRAZOLAM 0.25 MG
0.5 TABLET ORAL THREE TIMES A DAY
Qty: 0 | Refills: 0 | Status: DISCONTINUED | OUTPATIENT
Start: 2019-04-23 | End: 2019-04-30

## 2019-04-23 RX ORDER — ONDANSETRON 8 MG/1
5 TABLET, FILM COATED ORAL EVERY 6 HOURS
Qty: 0 | Refills: 0 | Status: DISCONTINUED | OUTPATIENT
Start: 2019-04-23 | End: 2019-04-23

## 2019-04-23 RX ORDER — SOD SULF/SODIUM/NAHCO3/KCL/PEG
1000 SOLUTION, RECONSTITUTED, ORAL ORAL
Qty: 0 | Refills: 0 | Status: COMPLETED | OUTPATIENT
Start: 2019-04-23 | End: 2019-04-23

## 2019-04-23 RX ORDER — MAGNESIUM SULFATE 500 MG/ML
2 VIAL (ML) INJECTION ONCE
Qty: 0 | Refills: 0 | Status: DISCONTINUED | OUTPATIENT
Start: 2019-04-23 | End: 2019-04-23

## 2019-04-23 RX ORDER — DOCUSATE SODIUM 100 MG
100 CAPSULE ORAL THREE TIMES A DAY
Qty: 0 | Refills: 0 | Status: DISCONTINUED | OUTPATIENT
Start: 2019-04-23 | End: 2019-04-23

## 2019-04-23 RX ORDER — ACETAMINOPHEN 500 MG
650 TABLET ORAL EVERY 6 HOURS
Qty: 0 | Refills: 0 | Status: DISCONTINUED | OUTPATIENT
Start: 2019-04-23 | End: 2019-04-23

## 2019-04-23 RX ORDER — LANOLIN ALCOHOL/MO/W.PET/CERES
1 CREAM (GRAM) TOPICAL
Qty: 0 | Refills: 0 | DISCHARGE
Start: 2019-04-23

## 2019-04-23 RX ORDER — VALPROIC ACID (AS SODIUM SALT) 250 MG/5ML
500 SOLUTION, ORAL ORAL EVERY 12 HOURS
Qty: 0 | Refills: 0 | Status: DISCONTINUED | OUTPATIENT
Start: 2019-04-23 | End: 2019-04-29

## 2019-04-23 RX ORDER — INFLUENZA VIRUS VACCINE 15; 15; 15; 15 UG/.5ML; UG/.5ML; UG/.5ML; UG/.5ML
0.5 SUSPENSION INTRAMUSCULAR ONCE
Qty: 0 | Refills: 0 | Status: COMPLETED | OUTPATIENT
Start: 2019-04-23 | End: 2019-04-23

## 2019-04-23 RX ORDER — TIOTROPIUM BROMIDE 18 UG/1
1 CAPSULE ORAL; RESPIRATORY (INHALATION) DAILY
Qty: 0 | Refills: 0 | Status: DISCONTINUED | OUTPATIENT
Start: 2019-04-23 | End: 2019-05-09

## 2019-04-23 RX ORDER — LIDOCAINE 4 G/100G
1 CREAM TOPICAL ONCE
Qty: 0 | Refills: 0 | Status: COMPLETED | OUTPATIENT
Start: 2019-04-23 | End: 2019-04-23

## 2019-04-23 RX ORDER — ZINC OXIDE 200 MG/G
1 OINTMENT TOPICAL DAILY
Qty: 0 | Refills: 0 | Status: DISCONTINUED | OUTPATIENT
Start: 2019-04-23 | End: 2019-05-02

## 2019-04-23 RX ORDER — PREGABALIN 225 MG/1
1 CAPSULE ORAL
Qty: 0 | Refills: 0 | DISCHARGE
Start: 2019-04-23

## 2019-04-23 RX ORDER — PANTOPRAZOLE SODIUM 20 MG/1
40 TABLET, DELAYED RELEASE ORAL
Qty: 0 | Refills: 0 | DISCHARGE
Start: 2019-04-23

## 2019-04-23 RX ORDER — DIVALPROEX SODIUM 500 MG/1
500 TABLET, DELAYED RELEASE ORAL
Qty: 0 | Refills: 0 | Status: DISCONTINUED | OUTPATIENT
Start: 2019-04-23 | End: 2019-04-23

## 2019-04-23 RX ORDER — SODIUM CHLORIDE 9 MG/ML
0 INJECTION, SOLUTION INTRAVENOUS
Qty: 0 | Refills: 0 | COMMUNITY
Start: 2019-04-23

## 2019-04-23 RX ORDER — TRAMADOL HYDROCHLORIDE 50 MG/1
1 TABLET ORAL
Qty: 0 | Refills: 0 | COMMUNITY
Start: 2019-04-23

## 2019-04-23 RX ORDER — TIOTROPIUM BROMIDE 18 UG/1
1 CAPSULE ORAL; RESPIRATORY (INHALATION)
Qty: 0 | Refills: 0 | DISCHARGE
Start: 2019-04-23

## 2019-04-23 RX ORDER — ONDANSETRON 8 MG/1
4 TABLET, FILM COATED ORAL EVERY 6 HOURS
Qty: 0 | Refills: 0 | Status: DISCONTINUED | OUTPATIENT
Start: 2019-04-23 | End: 2019-04-23

## 2019-04-23 RX ORDER — ZINC OXIDE 200 MG/G
1 OINTMENT TOPICAL
Qty: 0 | Refills: 0 | DISCHARGE
Start: 2019-04-23

## 2019-04-23 RX ORDER — SODIUM CHLORIDE 9 MG/ML
1000 INJECTION, SOLUTION INTRAVENOUS ONCE
Qty: 0 | Refills: 0 | Status: COMPLETED | OUTPATIENT
Start: 2019-04-23 | End: 2019-04-23

## 2019-04-23 RX ORDER — MAGNESIUM SULFATE 500 MG/ML
1 VIAL (ML) INJECTION
Qty: 0 | Refills: 0 | Status: COMPLETED | OUTPATIENT
Start: 2019-04-23 | End: 2019-04-24

## 2019-04-23 RX ORDER — PANTOPRAZOLE SODIUM 20 MG/1
40 TABLET, DELAYED RELEASE ORAL ONCE
Qty: 0 | Refills: 0 | Status: COMPLETED | OUTPATIENT
Start: 2019-04-23 | End: 2019-04-23

## 2019-04-23 RX ADMIN — SODIUM CHLORIDE 1000 MILLILITER(S): 9 INJECTION, SOLUTION INTRAVENOUS at 15:52

## 2019-04-23 RX ADMIN — Medication 650 MILLIGRAM(S): at 15:11

## 2019-04-23 RX ADMIN — ZINC OXIDE 1 APPLICATION(S): 200 OINTMENT TOPICAL at 12:47

## 2019-04-23 RX ADMIN — Medication 650 MILLIGRAM(S): at 23:45

## 2019-04-23 RX ADMIN — Medication 1000 MILLILITER(S): at 18:29

## 2019-04-23 RX ADMIN — PREGABALIN 1000 MICROGRAM(S): 225 CAPSULE ORAL at 12:47

## 2019-04-23 RX ADMIN — Medication 650 MILLIGRAM(S): at 06:44

## 2019-04-23 RX ADMIN — PANTOPRAZOLE SODIUM 40 MILLIGRAM(S): 20 TABLET, DELAYED RELEASE ORAL at 20:00

## 2019-04-23 RX ADMIN — ONDANSETRON 5 MILLIGRAM(S): 8 TABLET, FILM COATED ORAL at 18:10

## 2019-04-23 RX ADMIN — ONDANSETRON 4 MILLIGRAM(S): 8 TABLET, FILM COATED ORAL at 23:44

## 2019-04-23 RX ADMIN — Medication 650 MILLIGRAM(S): at 07:44

## 2019-04-23 RX ADMIN — Medication 100 GRAM(S): at 23:45

## 2019-04-23 RX ADMIN — PANTOPRAZOLE SODIUM 40 MILLIGRAM(S): 20 TABLET, DELAYED RELEASE ORAL at 05:07

## 2019-04-23 RX ADMIN — ONDANSETRON 4 MILLIGRAM(S): 8 TABLET, FILM COATED ORAL at 12:47

## 2019-04-23 RX ADMIN — Medication 650 MILLIGRAM(S): at 00:44

## 2019-04-23 RX ADMIN — Medication 650 MILLIGRAM(S): at 13:39

## 2019-04-23 RX ADMIN — Medication 650 MILLIGRAM(S): at 19:20

## 2019-04-23 RX ADMIN — Medication 650 MILLIGRAM(S): at 20:06

## 2019-04-23 RX ADMIN — LIDOCAINE 1 PATCH: 4 CREAM TOPICAL at 09:45

## 2019-04-23 RX ADMIN — Medication 2000 UNIT(S): at 12:47

## 2019-04-23 RX ADMIN — Medication 27.5 MILLIGRAM(S): at 18:09

## 2019-04-23 RX ADMIN — SODIUM CHLORIDE 100 MILLILITER(S): 9 INJECTION, SOLUTION INTRAVENOUS at 23:37

## 2019-04-23 RX ADMIN — LIDOCAINE 1 PATCH: 4 CREAM TOPICAL at 19:59

## 2019-04-23 RX ADMIN — LIDOCAINE 1 PATCH: 4 CREAM TOPICAL at 00:00

## 2019-04-23 RX ADMIN — DIVALPROEX SODIUM 500 MILLIGRAM(S): 500 TABLET, DELAYED RELEASE ORAL at 05:12

## 2019-04-23 RX ADMIN — SODIUM CHLORIDE 100 MILLILITER(S): 9 INJECTION, SOLUTION INTRAVENOUS at 06:44

## 2019-04-23 RX ADMIN — LIDOCAINE 1 PATCH: 4 CREAM TOPICAL at 21:20

## 2019-04-23 RX ADMIN — Medication 1000 MILLILITER(S): at 23:24

## 2019-04-23 RX ADMIN — Medication 0.12 MILLIGRAM(S): at 02:03

## 2019-04-23 NOTE — PROGRESS NOTE ADULT - SUBJECTIVE AND OBJECTIVE BOX
Follow up for tachycardia  SUBJ:    comfortable no cardiac symptoms reported complaints of nausea    PMH  Interstitial lung disease  NHL (non-Hodgkin's lymphoma)  Transient cerebral ischemia, unspecified type  Mitral prolapse  Pulmonary disease      MEDICATIONS  (STANDING):  ALPRAZolam 0.5 milliGRAM(s) Oral three times a day  buDESOnide   0.5 milliGRAM(s) Respule 0.5 milliGRAM(s) Inhalation two times a day  cholecalciferol 2000 Unit(s) Oral daily  cyanocobalamin 1000 MICROGram(s) Oral daily  diVALproex  milliGRAM(s) Oral two times a day  influenza   Vaccine 0.5 milliLiter(s) IntraMuscular once  lactated ringers. 1000 milliLiter(s) (100 mL/Hr) IV Continuous <Continuous>  lidocaine   Patch 1 Patch Transdermal daily  metoprolol tartrate 12.5 milliGRAM(s) Oral two times a day  ondansetron Injectable 4 milliGRAM(s) IV Push once  pantoprazole  Injectable 40 milliGRAM(s) IV Push every 12 hours  tiotropium 18 MICROgram(s) Capsule 1 Capsule(s) Inhalation daily  zinc oxide 20% Ointment 1 Application(s) Topical daily    MEDICATIONS  (PRN):  acetaminophen   Tablet .. 650 milliGRAM(s) Oral every 6 hours PRN Temp greater or equal to 38C (100.4F), Mild Pain (1 - 3)  ALBUTerol/ipratropium for Nebulization 3 milliLiter(s) Nebulizer every 6 hours PRN Shortness of Breath and/or Wheezing  melatonin 3 milliGRAM(s) Oral at bedtime PRN Sleep  ondansetron   Disintegrating Tablet 4 milliGRAM(s) Oral every 6 hours PRN Nausea and/or Vomiting  traMADol 50 milliGRAM(s) Oral three times a day PRN Severe Pain (7 - 10)        PHYSICAL EXAM:  Vital Signs Last 24 Hrs  T(C): 36.7 (23 Apr 2019 08:35), Max: 37 (22 Apr 2019 20:53)  T(F): 98 (23 Apr 2019 08:35), Max: 98.6 (22 Apr 2019 20:53)  HR: 86 (23 Apr 2019 09:22) (57 - 119)  BP: 100/65 (23 Apr 2019 08:35) (90/60 - 106/71)  BP(mean): --  RR: 16 (23 Apr 2019 08:35) (14 - 16)  SpO2: 94% (23 Apr 2019 09:22) (94% - 99%)    GENERAL: NAD, well-groomed, well-developed  HEAD:  Atraumatic, Normocephalic  EYES: EOMI, PERRLA, conjunctiva and sclera clear  ENT: Moist mucous membranes,  NECK: Supple, No JVD, no bruits  CHEST/LUNG: Clear to percussion bilaterally; No rales, rhonchi, wheezing, or rubs  HEART: Regular rate and rhythm; No murmurs, rubs, or gallops PMI non displaced.  ABDOMEN: Soft, Nontender, Nondistended; Bowel sounds present  EXTREMITIES:  2+ Peripheral Pulses, No clubbing, cyanosis, or edema  SKIN: No rashes or lesions  NERVOUS SYSTEM:  Cranial Nerves II-XII intact      TELEMETRY:    st 107    ECG:    < from: 12 Lead ECG (04.22.19 @ 13:23) >  Ventricular Rate 108 BPM    Atrial Rate 108 BPM    P-R Interval 140 ms    QRS Duration 102 ms    Q-T Interval 332 ms    QTC Calculation(Bezet) 444 ms    P Axis 76 degrees    R Axis 59 degrees    T Axis 26 degrees    Diagnosis Line Sinus tachycardia  Nonspecific T wave abnormality    When compared with ECG of 20-APR-2019 05:07,  No significant change was found  Confirmed by GABRIELA LAZCANO, FLAQUITO ([id number]) on 4/22/2019 4:56:59 PM    < end of copied text >    ECHO:    LABS:                        9.9    x     )-----------( x        ( 23 Apr 2019 11:15 )             31.3     04-23    140  |  101  |  27<H>  ----------------------------<  110<H>  4.5   |  30  |  0.79    Ca    9.4      23 Apr 2019 04:55          PT/INR - ( 23 Apr 2019 04:55 )   PT: 10.8 sec;   INR: 0.97 ratio             I&O's Summary    BNP    RADIOLOGY & ADDITIONAL STUDIES:    < from: Xray Chest 1 View- PORTABLE-Routine (04.22.19 @ 14:22) >    IMPRESSION: Chronic   Fibrotic lung disease.        No pneumothorax. Further anatomical detail is required due to patient's   persistent symptoms follow-up noncontrast CT scan recommended.        KAMAR JIMENEZ   This document has been electronically signed. Apr 22 2019  3:44PM    < end of copied text >    < from: CT Chest No Cont (08.21.18 @ 16:31) >  IMPRESSION:  Patchy groundglass opacities with consolidations and multiple bilateral   pulmonary nodules as well as diffuse pleural thickening. Based on the   patient's history of non-Hodgkin's disease, these findings may represent   extranodal lymphoma. Differential diagnosis also includes inflammatory or   atypical infectious processes.    Small pericardial effusion.        WILLARD OLSEN M.D., ATTENDING RADIOLOGIST  This document has been electronically signed. Aug 21 2018  5:23PM    < end of copied text >        ECHO:

## 2019-04-23 NOTE — DIETITIAN INITIAL EVALUATION ADULT. - NUTRITIONGOAL OUTCOME3
Pt to tolerate diet upgrade as medically feasible & tolerate MVI w/ minerals as appropriate x 3 days

## 2019-04-23 NOTE — DIETITIAN INITIAL EVALUATION ADULT. - PERTINENT LABORATORY DATA
(4/23/19) Hgb 9.9 L, Hct 31.3 L, Na 140 wnl, K 4.5 wnl, Cl 101 wnl, CO2 30 wnl, BUN 9 wnl, Creat 27 H, Glu 110 H, Ca 9.4 wnl

## 2019-04-23 NOTE — CHART NOTE - NSCHARTNOTEFT_GEN_A_CORE
Rapid response Called by RN for Pt with bloody bowel movement    Patient seen and examined at bedside. Hospitalist present.   Patient had large bright red bloody bowel movement which was ongoing.  Patient states that she had bloody bowel movement week prior but has not had one in some time. She states that she has history of ulcer and does not use aspirin for this reason. Denies any history of colonoscopy.   Patient denies any sob, chest pain, light headedness, dizziness.  Admits fatigue    HR: 118  BP: 90/60  RR: 14   SpO2: 98%  Wt(kg): --    Physical :  Gen- anxious, ncat  Cardio - s+1,s+2, tachycardic  Lung - cta b/l  Abdomen- +BS, NT/ND, no guarding, no rebound, no masses  Ext- no edema, 2+ pulses b/l  Rectal: large bright red bloody bowel movement in diaper    LABS:                        11.2   11.2  )-----------( 372      ( 23 Apr 2019 04:55 )             34.2     04-23    140  |  101  |  27<H>  ----------------------------<  110<H>  4.5   |  30  |  0.79    Ca    9.4      23 Apr 2019 04:55      PT/INR - ( 23 Apr 2019 04:55 )   PT: 10.8 sec;   INR: 0.97 ratio        Assessment/Plan  59yFemale admitted for left sided weakness 2/2 to encephalitis vs cva now with large bloody bowel movement  -D/c aspirin and heparin  -started on IVF and PPI BID IV  -Stat cbc/bmp/coags and t&S  -EKG done showing sinus tachycardia  -Patient to be transferred to telemetry for further monitoring given continuous nature of BRBPR

## 2019-04-23 NOTE — PROGRESS NOTE ADULT - REASON FOR ADMISSION
s/p CVA c left sided weakness and functional deficits.
s/p CVA c left sided weakness and functional deficits.
s/p suspected CVA vs encephalitis, now c left sided weakness and functional deficits.
s/p CVA c left sided weakness and functional deficits.

## 2019-04-23 NOTE — DIETITIAN INITIAL EVALUATION ADULT. - SIGNS/SYMPTOMS
AEB muscle loss from temples, calves, estimated intake <50-75% of needs AEB altered nutrition-related lab values (low H/H)

## 2019-04-23 NOTE — DIETITIAN INITIAL EVALUATION ADULT. - OTHER INFO
Pt recently admitted here to [place] [facility] acute re[facility] on 04/19/19. Pt had rectal bleed w/ blood clots per H&P. Has scheduled colonoscopy tomorrow. Currently NPO. Discussed wt hx and diet recall w/ pt; reports that she lost approx. 5 lbs since november d/t constipation, bloating, and decreased PO intake. Reports that she was previously told to avoid high-fiber foods but eats whatever she can as she is a vegetarian. Pt also reports consuming Ensure PTA to supplement diet d/t hx of weight loss r/t UC. Pt not receptive to nutrition ed during time of interview as pt appeared to be in pain/anxious (PCA & nurse aware). Pt meets diagnostic criteria for malnutrition in context of chronic illness as evidenced by decreased PO intake, loss of muscle from calves &  temporal region. Pt noted w/ low H/H; may benefit from MVI w/ iron. Skin intact of PU. Moses Score 12. RD to f/up w/ recommendations pending diet upgrade. Pt would benefit from regular diet w/ oral supplement (Ensure Enlive PO BID which provides 700 kcal, 40 g protein)

## 2019-04-23 NOTE — PROGRESS NOTE ADULT - ASSESSMENT
Patient with history of treated NHL in remission, there was mention of an infiltrate 8/18/18 that demonstrate a ground glass opacity that raised the question of extranodal recurrence of the NHL. However patient also has Insterstitial lung disease, so I suspect that this was probably unrelated to the hogkins lymphoma. patient had a PET CT scan on 4/5/19 where there was no mention of a recurrence to the lung, suspects he actually doesn't have a recurrence. This can be put to rest by obtaining the PTE CT scan. Will have my office call [facility] for the report, and if they indeed aga the HIPPA waiver will bring it with me tomorrow.     Patient started GI bleeding again Hg dropped to 9.9g/dl now getting blood transfusion  Complete blood transfusion today, will follow and if IV iron needed order it.  The transfusion should be sufficient for now.

## 2019-04-23 NOTE — PROGRESS NOTE ADULT - SUBJECTIVE AND OBJECTIVE BOX
Patient suffered a GI bleed last night, patient was readmitted to telemetry. Hg decreased from 11.4 down to 9.9g/dl. Patient now getting blood transfusion  I discussed with her the recent PET CT scan from 4/5/19, patient did not recall where this was from, however patient's  recalls this being done at [place].  We discussed signing a waver form however there was none available on the floor.

## 2019-04-23 NOTE — DIETITIAN INITIAL EVALUATION ADULT. - SOURCE
patient/58 y/o F w/ PMH ulcerative colitis, NHL, 59 yr old female PMHx of MVP, chronic SDH, interstitial lung disease/pneumothorax, pulmonary nodules, meniere's, non hodgkins lymphoma (SCD/XRT/chemo at [facility] 2003), TIA, tachycardia, occipital neuralgia, and neuropathy.

## 2019-04-23 NOTE — DISCHARGE NOTE PROVIDER - CARE PROVIDER_API CALL
Jody Walton)  Neurology  [address] [place] [address]  Phone: [phone]  Fax: [phone]  Follow Up Time:

## 2019-04-23 NOTE — CHART NOTE - NSCHARTNOTEFT_GEN_A_CORE
Called to evaluate patient for vascular access.  With aseptic technique 20g 6cm extended dwell peripheral catheter placed in left upper arm with sonographic guidance on first attempt.  Dark blood return noted from port, flushed with no resistance.  Biopatch and sterile dressing applied, patient tolerated procedure well.

## 2019-04-23 NOTE — DISCHARGE NOTE PROVIDER - NSDCCPCAREPLAN_GEN_ALL_CORE_FT
PRINCIPAL DISCHARGE DIAGNOSIS  Diagnosis: Bright red blood per rectum  Assessment and Plan of Treatment: started on PPI. tranfer to telemetry  consider PPI drip. GI consult/colonoscopy.  CBC with PRBC replacment as needed      SECONDARY DISCHARGE DIAGNOSES  Diagnosis: NHL (non-Hodgkin's lymphoma)  Assessment and Plan of Treatment: heme onc following    Diagnosis: Tachycardia  Assessment and Plan of Treatment: c/w metoprolol    Diagnosis: Left-sided weakness  Assessment and Plan of Treatment: cva vs encephalitis

## 2019-04-23 NOTE — CHART NOTE - NSCHARTNOTEFT_GEN_A_CORE
59 yr old female PMHx of MVP, chronic SDH, interstitial lung disease/pneumothorax, pulmonary nodules, meniere's, non hodgkins lymphoma (SCD/XRT/chemo at [facility] 2003), TIA, tachycardia, occipital neuralgia, and neuropathy transferred from [facility] with BRBPR. Patient has had 2 eposides painless BRB this AM.-symptoms may be secondary to diverticulosis.  Hgb this AM 11.2    Plan:  -Ensure 2 IVs at all time  -Repeat CBC at 11 AM this morning--transfuse Hgb < 7  -Appreciate GI consult  -Maintain active T+S  -NPO  -Cont IV PPI  -DVT ppx: venodynes in the setting of GIB

## 2019-04-23 NOTE — H&P ADULT - HISTORY OF PRESENT ILLNESS
Large amount of blood clot noted in diaper 59 yr old woman with a hx of according to her daughter undiagnosed pulmonary disease, hx of lymphoma in the past , hx of undiagnosed neurologic disorder, hx of tachycardia ,hx of at least mild to moderate but not severe mitral insufficiency, hx of small pneumothorax, hx of painful hemorrhoids now in Rehab. there is a concern due to tachycardia both at rest and with rehab. patient has had Pulmonary embolus ruled out at [facility] during the time she has had this. She caitlyn an electrophysiologist in [place] who according to patient recommended Karolineer, but this was never started. She had been on Diltiazem for a time with questionable efficacy but this was stopped due to hypotension. Patient has no cardiac symptoms at this time. She states she does at times wheeze  Large amount of blood clot noted in diaper 59 yr old woman with a 60 yo female with PMHx of MVP, chronic SDH, interstitial lung disease/pneumothorax, pulmonary nodules, meniere's, non hodgkins lymphoma (SCD/XRT/chemo at [facility] 2003), TIA, tachycardia, occipital neuralgia, and neuropathy.  Patient was initially admitted to [place] on 3/13/19 with acute onset of b/l UE weakness associated with nausea, blurry vision, and HA. CT head showed chronic B/L frontal SDH. The rest of the workup was negative. CVA/TIA r/out.  Pt was discharged home but on 3/17 had apparent seizure.  She was admitted to [facility] and intubated and placed on depakote.  CTA H/N, MRI, EEG unremarkable.  ID Recommended LP for fever; family deferred.  Transferred to [facility] on 3/19/19.  MRI brain showed cerebral ischemia. The rest of the workup was negative, this including CSF studies. EEG neg seizures. (Evaluated by lymphoma specialist/onco, PET 4/5/19 r/o recurrence of lymphoma. Increased signal at base of tongue to follow up outpt ENT). Additionally, course complicated with pneumothorax, pulmonary consulted, no interventions, self resolved.  Tachycardia - per pulm, related related to chronic lung disease.  GI consulted for rectal pain; diagnosed c anal fissure/constipation, bowel regimen started. KUB 4/12/19 No stool, no free air, no distended loops of small bowel, fibroids. Refused further workup.   Other issues while admitted:  Hypotension - home diltiazem held per cardio  dizziness - sporadic, self resolving.  Pt deferred MRI.  May be 2/2 to hx of Meniere's disease.      *TTE 3/18/19 EF 60%, mod-severe MR.   MRI brain 3/29/19 cortically based restricted diffusion within R>L frontoparietal region as well as additional scattered small foci, may represent evolution of a recent ischemia vs improving encephalitis.     Patient medically optimized and cleared for discharge to acute rehab at [facility] on 4/19/19.  Patient seen and examined upon arrival at [facility].  She had no new complaints at that time. (19 Apr 2019 13:31)  Pulm:  ct reportedly showed changes consistent with pulm fibrosis.  Pt had seen cts at [facility] and was offered a bx which she was not interesed in. hx of according to her daughter undiagnosed pulmonary disease, hx of lymphoma in the past , hx of undiagnosed neurologic disorder, hx of tachycardia ,hx of at least mild to moderate but not severe mitral insufficiency, hx of small pneumothorax, hx of painful hemorrhoids now in Rehab. there is a concern due to tachycardia both at rest and with rehab. patient has had Pulmonary embolus ruled out at [facility] during the time she has had this. She caitlyn an electrophysiologist in [place] who according to patient recommended Juan F, but this was never started. She had been on Diltiazem for a time with questionable efficacy but this was stopped due to hypotension. Patient has no cardiac symptoms at this time. She states she does at times wheeze  Large amount of blood clot noted in diaper 59 yr old female PMHx of MVP, chronic SDH, interstitial lung disease/pneumothorax, pulmonary nodules, meniere's, non hodgkins lymphoma (SCD/XRT/chemo at [facility] 2003), TIA, tachycardia, occipital neuralgia, and neuropathy.  She was recently admitted here to [place] acute rehab on 04/19/19.  RRT was called early this AM because pt was noted to have 2 bloody BMs on diaper, with blood clots.  Pt denies any abdominal pain, chest pain, but very anxious.  She was tachycardic to 120's, BP 90/60, O2 sat was 98%, and she was afebrile.    She had several recent hospitalizations, and did state that she had one episode on hematochezia during last hospitalization, that resolved by itself.  Had been seen by GI for rectal pain, found to have anal fissure, hemorrhoids, constipation and was placed on a bowel regimen. Had a KUB 4/12/19 No stool, no free air, no distended loops of small bowel, fibroids. Refused further workup.  She never had a colonoscopy.  She was initially admitted to [place] on 3/13/19 with acute onset of b/l UE weakness associated with nausea, blurry vision, and HA. CT head showed chronic B/L frontal SDH. The rest of the workup was negative. CVA/TIA r/out.  Pt was discharged home but on 3/17 had apparent seizure.  She was admitted to [facility] and intubated and placed on depakote.  CTA H/N, MRI, EEG unremarkable.  ID Recommended LP for fever; family deferred.  Transferred to [facility] on 3/19/19.  MRI brain showed cerebral ischemia. The rest of the workup was negative, this including CSF studies. EEG neg seizures. Sukhdev had workup for PE and was neg. (Evaluated by lymphoma specialist/onco, PET 4/5/19 r/o recurrence of lymphoma. Increased signal at base of tongue to follow up outpt ENT). Additionally, course complicated with pneumothorax, pulmonary consulted, no interventions, self resolved.  Tachycardia - per pulm, related to chronic lung disease.  Had an echo 3/18/19 EF 60%, mod-severe MR, had MRI brain 3/29/19 cortically based restricted diffusion within R>L frontoparietal region as well as additional scattered small foci, may represent evolution of a recent ischemia vs improving encephalitis. 59 yr old female PMHx of MVP, chronic SDH, interstitial lung disease/pneumothorax, pulmonary nodules, meniere's, non hodgkins lymphoma (SCD/XRT/chemo at [facility] 2003), TIA, tachycardia, occipital neuralgia, and neuropathy.  She was recently admitted here to [place] acute rehab on 04/19/19.  RRT was called early this AM because pt was noted to have 2 bloody BMs on diaper, with blood clots.  Pt denies any abdominal pain, chest pain, but very anxious.  She was tachycardic to 120's, BP 90/60, O2 sat was 98%, and she was afebrile.    She had several recent hospitalizations, and did state that she had one episode on hematochezia during last hospitalization, that resolved by itself.  Had been seen by GI for rectal pain, found to have anal fissure, hemorrhoids, constipation and was placed on a bowel regimen. Had a KUB 4/12/19 No stool, no free air, no distended loops of small bowel, fibroids. Refused further workup.  She never had a colonoscopy.    She was initially admitted to [place] on 3/13/19 with acute onset of b/l UE weakness associated with nausea, blurry vision, and HA. CT head showed chronic B/L frontal SDH. The rest of the workup was negative. CVA/TIA r/out.  Pt was discharged home but on 3/17 had apparent seizure.  She was admitted to [facility] and intubated and placed on depakote.  CTA H/N, MRI, EEG unremarkable.  ID Recommended LP for fever; family deferred.  Transferred to [facility] on 3/19/19.  MRI brain showed cerebral ischemia. The rest of the workup was negative, this including CSF studies. EEG neg seizures. Sukhdev had workup for PE and was neg. (Evaluated by lymphoma specialist/onco, PET 4/5/19 r/o recurrence of lymphoma. Increased signal at base of tongue to follow up outpt ENT). Additionally, course complicated with pneumothorax, pulmonary consulted, no interventions, self resolved.  Tachycardia - per pulm, related to chronic lung disease.  Had an echo 3/18/19 EF 60%, mod-severe MR, had MRI brain 3/29/19 cortically based restricted diffusion within R>L frontoparietal region as well as additional scattered small foci, may represent evolution of a recent ischemia vs improving encephalitis.   She was stabilized and d/bonnie here for rehab.

## 2019-04-23 NOTE — DIETITIAN INITIAL EVALUATION ADULT. - NS AS NUTRI INTERV MEALS SNACK
MVI w/ minerals, recommend oral nutrition supplement (Ensure Enlive PO BID) when appropriate/General/healthful diet/Other (specify)

## 2019-04-23 NOTE — CONSULT NOTE ADULT - PROBLEM SELECTOR RECOMMENDATION 9
NPO   Colonoscopy tomorrow with Dr. Francisco Stanford Prep 15:00 and 21:00  Check Hemoglobin at 20:00 and in am  Transfuse if < 7 or if patient becomes symptomatic NPO   Colonoscopy tomorrow with Dr. Francisco Stanford Prep 17:00 and 22:00  Check Hemoglobin at 20:00 and in am  Transfuse if < 7 or if patient becomes symptomatic

## 2019-04-23 NOTE — INPATIENT CERTIFICATION FOR MEDICARE PATIENTS - RISKS OF ADVERSE EVENTS
Concern for delay in diagnosis and treatment/Concern for cardiopulmonary deterioration/further bleeding

## 2019-04-23 NOTE — H&P ADULT - NSHPPHYSICALEXAM_GEN_ALL_CORE
Vital Signs (24 Hrs):  T(C): 36.3 (04-23-19 @ 05:55), Max: 37 (04-22-19 @ 20:53)  HR: 57 (04-23-19 @ 05:55) (57 - 130)  BP: 93/63 (04-23-19 @ 05:55) (90/60 - 111/77)  RR: 15 (04-23-19 @ 05:55) (14 - 16)  SpO2: 99% (04-23-19 @ 05:55) (95% - 100%)

## 2019-04-23 NOTE — CHART NOTE - NSCHARTNOTEFT_GEN_A_CORE
Upon Nutritional Assessment by the Registered Dietitian your patient was determined to meet criteria / has evidence of the following diagnosis/diagnoses:          [ ]  Mild Protein Calorie Malnutrition        [ ]  Moderate Protein Calorie Malnutrition        [X] Severe Protein Calorie Malnutrition        [ ] Unspecified Protein Calorie Malnutrition        [X] Underweight / BMI <19        [ ] Morbid Obesity / BMI > 40      Findings as based on:  [X] Comprehensive nutrition assessment   [X] Nutrition Focused Physical Exam  [ ] Other:     Nutrition Plan/Recommendations:    1. As pt is NPO, recommend MVI w/ minerals d/t altered GI function/absorption   2. Recommend regular diet when medically feasible supplemented w/ Ensure Enlive 240 ml PO BID        PROVIDER Section:     By signing this assessment you are acknowledging and agree with the diagnosis/diagnoses assigned by the Registered Dietitian    Comments:

## 2019-04-23 NOTE — H&P ADULT - RS GEN PE MLT RESP DETAILS PC
breath sounds equal/good air movement/respirations non-labored/no wheezes/no rhonchi/decreased breath sounds at bases

## 2019-04-23 NOTE — CONSULT NOTE ADULT - SUBJECTIVE AND OBJECTIVE BOX
Initial HPI on admission:  HPI:  59 yr old female PMHx of MVP, chronic SDH, interstitial lung disease/pneumothorax, pulmonary nodules, meniere's, non hodgkins lymphoma (SCD/XRT/chemo at [facility] ), TIA, tachycardia, occipital neuralgia, and neuropathy.  She was recently admitted here to [place] acute rehab on 19.  RRT was called early this AM because pt was noted to have 2 bloody BMs on diaper, with blood clots.  Pt denies any abdominal pain, chest pain, but very anxious.  She was tachycardic to 120's, BP 90/60, O2 sat was 98%, and she was afebrile.    She had several recent hospitalizations, and did state that she had one episode on hematochezia during last hospitalization, that resolved by itself.  Had been seen by GI for rectal pain, found to have anal fissure, hemorrhoids, constipation and was placed on a bowel regimen. Had a KUB 19 No stool, no free air, no distended loops of small bowel, fibroids. Refused further workup.  She never had a colonoscopy.  She was initially admitted to [place] on 3/13/19 with acute onset of b/l UE weakness associated with nausea, blurry vision, and HA. CT head showed chronic B/L frontal SDH. The rest of the workup was negative. CVA/TIA r/out.  Pt was discharged home but on 3/17 had apparent seizure.  She was admitted to [facility] and intubated and placed on depakote.  CTA H/N, MRI, EEG unremarkable.  ID Recommended LP for fever; family deferred.  Transferred to [facility] on 3/19/19.  MRI brain showed cerebral ischemia. The rest of the workup was negative, this including CSF studies. EEG neg seizures. Sukhdev had workup for PE and was neg. (Evaluated by lymphoma specialist/onco, PET 19 r/o recurrence of lymphoma. Increased signal at base of tongue to follow up outpt ENT). Additionally, course complicated with pneumothorax, pulmonary consulted, no interventions, self resolved.  Tachycardia - per pulm, related to chronic lung disease.  Had an echo 3/18/19 EF 60%, mod-severe MR, had MRI brain 3/29/19 cortically based restricted diffusion within R>L frontoparietal region as well as additional scattered small foci, may represent evolution of a recent ischemia vs improving encephalitis. (2019 05:31)      BRIEF HOSPITAL COURSE: This morning patient was admitted to telemetry for acute GI bleeding with bright red blood per rectum. Slow drop in hgb this AM. Now ICU consulted as patient with multiple bloody bowel movements and developing shock. At time of evaluation patient with maroon colored stool. Patient denies any chest pain or shortness of breath. But feels weak.     PAST MEDICAL & SURGICAL HISTORY:  Interstitial lung disease: on home o2 prn  NHL (non-Hodgkin's lymphoma): Agem 43 sp chemo/rt/stem cell  Transient cerebral ischemia, unspecified type  Mitral prolapse  History of tonsillectomy  History of appendectomy    Allergies  IV Contrast (Anaphylaxis)  shellfish. (Anaphylaxis)    Intolerances    FAMILY HISTORY:  Family history of stroke  Family history of breast cancer: Mother    Social history: Denies smoking, alcohol or drug use     Review of Systems:  CONSTITUTIONAL: No fever, chills, or fatigue  EYES: No eye pain, visual disturbances, or discharge  ENMT:  No difficulty hearing, tinnitus, vertigo; No sinus or throat pain  NECK: No pain or stiffness  RESPIRATORY: Per above  CARDIOVASCULAR: No chest pain, palpitations, dizziness, or leg swelling  GASTROINTESTINAL: No abdominal or epigastric pain. No nausea, vomiting, or hematemesis; No diarrhea or constipation. No melena or hematochezia.  GENITOURINARY: No dysuria, frequency, hematuria, or incontinence  NEUROLOGICAL: No headaches, memory loss, loss of strength, numbness, or tremors  SKIN: No itching, burning, rashes, or lesions   MUSCULOSKELETAL: No joint pain or swelling; No muscle, back, or extremity pain  PSYCHIATRIC: No depression, anxiety, mood swings, or difficulty sleeping    Medications:  acetaminophen   Tablet .. 650 milliGRAM(s) Oral every 6 hours PRN Temp greater or equal to 38C (100.4F), Mild Pain (1 - 3)  ALBUTerol/ipratropium for Nebulization 3 milliLiter(s) Nebulizer every 6 hours PRN Shortness of Breath and/or Wheezing  ALPRAZolam 0.5 milliGRAM(s) Oral three times a day  buDESOnide   0.5 milliGRAM(s) Respule 0.5 milliGRAM(s) Inhalation two times a day  cholecalciferol 2000 Unit(s) Oral daily  cyanocobalamin 1000 MICROGram(s) Oral daily  diVALproex  milliGRAM(s) Oral two times a day  influenza   Vaccine 0.5 milliLiter(s) IntraMuscular once  lactated ringers Bolus 1000 milliLiter(s) IV Bolus once  lactated ringers. 1000 milliLiter(s) (100 mL/Hr) IV Continuous <Continuous>  lidocaine   Patch 1 Patch Transdermal daily  melatonin 3 milliGRAM(s) Oral at bedtime PRN Sleep  ondansetron   Disintegrating Tablet 4 milliGRAM(s) Oral every 6 hours PRN Nausea and/or Vomiting  pantoprazole  Injectable 40 milliGRAM(s) IV Push every 12 hours  tiotropium 18 MICROgram(s) Capsule 1 Capsule(s) Inhalation daily  traMADol 50 milliGRAM(s) Oral three times a day PRN Severe Pain (7 - 10)  zinc oxide 20% Ointment 1 Application(s) Topical daily    MEDICATIONS  (STANDING):  ALPRAZolam 0.5 milliGRAM(s) Oral three times a day  buDESOnide   0.5 milliGRAM(s) Respule 0.5 milliGRAM(s) Inhalation two times a day  cholecalciferol 2000 Unit(s) Oral daily  cyanocobalamin 1000 MICROGram(s) Oral daily  diVALproex  milliGRAM(s) Oral two times a day  influenza   Vaccine 0.5 milliLiter(s) IntraMuscular once  lactated ringers Bolus 1000 milliLiter(s) IV Bolus once  lactated ringers. 1000 milliLiter(s) (100 mL/Hr) IV Continuous <Continuous>  lidocaine   Patch 1 Patch Transdermal daily  pantoprazole  Injectable 40 milliGRAM(s) IV Push every 12 hours  tiotropium 18 MICROgram(s) Capsule 1 Capsule(s) Inhalation daily  zinc oxide 20% Ointment 1 Application(s) Topical daily    MEDICATIONS  (PRN):  acetaminophen   Tablet .. 650 milliGRAM(s) Oral every 6 hours PRN Temp greater or equal to 38C (100.4F), Mild Pain (1 - 3)  ALBUTerol/ipratropium for Nebulization 3 milliLiter(s) Nebulizer every 6 hours PRN Shortness of Breath and/or Wheezing  melatonin 3 milliGRAM(s) Oral at bedtime PRN Sleep  ondansetron   Disintegrating Tablet 4 milliGRAM(s) Oral every 6 hours PRN Nausea and/or Vomiting  traMADol 50 milliGRAM(s) Oral three times a day PRN Severe Pain (7 - 10)      Home Medications:  Last Order Reconciliation Date: 19 @ 06:17 (Admission Reconciliation)  acetaminophen 325 mg oral tablet: 2 tab(s) orally every 6 hours, As needed, For Temp greater than 38 C (100.4 F) (18 @ 15:06)  acetaminophen 325 mg oral tablet: 2 tab(s) orally every 6 hours, As needed, Mild Pain (1 - 3) (19 @ 01:33)  acetaminophen 325 mg oral tablet: 2 tab(s) orally every 6 hours, As needed, Mild Pain (1 - 3) (19 @ 05:07)  ALPRAZolam 0.5 mg oral tablet: 0.5 tab(s) orally 3 times a day (19 @ 05:07)  aspirin 81 mg oral delayed release tablet: 1 tab(s) orally once a day (19 @ 01:33)  azithromycin 500 mg oral tablet: 1 tab(s) orally once a day MDD:500 mg (19 @ 16:01)  budesonide 0.25 mg/2 mL inhalation suspension:  inhaled  (19 @ 05:07)  cefuroxime 250 mg oral tablet: 1 tab(s) orally 2 times a day  (18 @ 14:54)  cefuroxime 500 mg oral tablet: 1 tab(s) orally 2 times a day  (19 @ 01:33)  cholecalciferol oral tablet: 2000 unit(s) orally once a day (19 @ 05:07)  cyanocobalamin 1000 mcg oral tablet: 1 tab(s) orally once a day (19 @ 05:07)  DILTIAZEM HYDROCHLORIDE  MG CP24:  (19 @ 08:12)  DILTIAZEM HYDROCHLORIDE  MG CP24: 1 tab(s) orally once a day (19 @ 18:00)  divalproex sodium 500 mg oral delayed release tablet: 1 tab(s) orally 2 times a day (19 @ 05:07)  docusate sodium 100 mg oral capsule: 1 cap(s) orally 3 times a day (19 01:33)  GABAPENTIN 100 MG CAPS:  (19:33)  guaiFENesin 600 mg oral tablet, extended release: 1 tab(s) orally every 12 hours, As needed, Cough (18 @ 15:06)  Keppra 1000 mg oral tablet: 1 tab(s) orally 2 times a day  (19 @ 16:01)  Lactated Ringers Injection intravenous solution:  intravenous  (19 05:07)  lactobacillus acidophilus oral capsule: 1 tab(s) orally 3 times a day (before meals) (19:33)  levalbuterol 0.63 mg/3 mL inhalation solution: 3 milliliter(s) inhaled every 8 hours, As needed, bronchospasm (19 05:07)  LEVALBUTEROL TARTRATE HFA 45 MCG/ACT AERO:  (19 01:33)  lidocaine 5% topical film:  topically  (19 05:07)  melatonin 3 mg oral tablet: 1 tab(s) orally once a day (at bedtime), As needed, Sleep (19 05:07)  Metoprolol Tartrate 25 mg oral tablet: 0.5 tab(s) orally 2 times a day (19 @ 05:07)  ondansetron 4 mg oral tablet, disintegratin tab(s) orally every 6 hours, As needed, Nausea and/or Vomiting (19 05:07)  pantoprazole 40 mg intravenous injection: 40 milligram(s) intravenous every 12 hours (19 05:07)  phenylephrine 0.25%-3% rectal ointment: 1 application rectal 4 times a day, As needed, post-BM, pain (19 06:11)  senna oral tablet: 2 tab(s) orally once a day (at bedtime), As needed, Constipation (19:33)  SPIRIVA RESPIMAT 2.5 MCG/ACT AERS:  (19 01:33)  tiotropium 18 mcg inhalation capsule: 1 cap(s) inhaled once a day (19 @ 05:07)  traMADol 50 mg oral tablet: 1 tab(s) orally 3 times a day, As needed, Severe Pain (7 - 10) (19 @ 05:07)  Vitamin B12 1000 mcg oral tablet: 1 tab(s) orally once a day (19 18:00)  Vitamin D3 1000 intl units oral capsule: 1 cap(s) orally once a day (19 18:00)  zinc oxide 20% topical ointment: 1 application topically once a day (19 05:07)      LABS:                        9.9    x     )-----------( x        ( 2019 11:15 )             31.3         140  |  101  |  27<H>  ----------------------------<  110<H>  4.5   |  30  |  0.79    Ca    9.4      2019 04:55    PT/INR - ( 2019 04:55 )   PT: 10.8 sec;   INR: 0.97 ratio      VITALS:  T(C): 36.7 (19 @ 14:15), Max: 37 (19 @ 20:53)  T(F): 98 (19 @ 14:15), Max: 98.6 (19 @ 20:53)  HR: 110 (19 @ 15:00) (57 - 119)  BP: 61/49 (19 @ 15:00) (61/49 - 106/71)  BP(mean): 54 (19 @ 15:00) (54 - 54)  ABP: --  ABP(mean): --  RR: 23 (19 @ 15:00) (14 - 23)  SpO2: 81% (19 15:00) (81% - 99%)  CVP(mm Hg): --  CVP(cm H2O): --    Ins and Outs       Height (cm): 175.26 (19 @ 05:55)  Weight (kg): 57.8 (19 05:55)  BMI (kg/m2): 18.8 (19 @ 05:55)      Physical Examination:  GENERAL:               Alert, Oriented, No acute distress.    HEENT:                    Pupils equal, reactive to light.  Symmetric. No JVD, Moist MM  PULM:                     Bilateral air entry, Clear to auscultation bilaterally, No Rales, No Rhonchi, No Wheezing  CVS:                         S1, S2,  + Murmur  ABD:                        Soft, nondistended, nontender, normoactive bowel sounds,   EXT:                         No edema, nontender, No Cyanosis or Clubbing   Vascular:                Warm Extremities, Normal Capillary refill, Normal Distal Pulses  SKIN:                       Warm and well perfused, no rashes noted. Pale with poor capillary refill  NEURO:                  Alert, oriented, interactive, nonfocal, follows commands. Weak left upper extremity strength 1/5. Other three extremities 4/5   PSYC:                      Calm, + Insight.

## 2019-04-23 NOTE — CONSULT NOTE ADULT - ATTENDING COMMENTS
Patient seen and examined with Bettye George NP.  Agree with assessment and plan as above.    Elderly female admitted from rehab due to ? lower GI bleed.  Shortly after being seen, she became hypotensive.  Repeat Hgb 10 - appears stable.  Abdomen soft, nontender to exam.    Once hemodynamically stable, would proceed to EGD & Colonoscopy for further evaluation of GI bleeding.

## 2019-04-23 NOTE — H&P ADULT - ASSESSMENT
59 yr old female PMHx of MVP, chronic SDH, interstitial lung disease/pneumothorax, pulmonary nodules, meniere's, non hodgkins lymphoma (SCD/XRT/chemo at [facility] 2003), TIA, tachycardia, occipital neuralgia, and neuropathy, now with hematochezia/rectal bleeding.    Admit to tele  NPO  IV Hydration with LR  D/C ASA and Lovenox  Protonix IV 40 mg twice a day  Close monitoring of labs (H/H)  GI Consult-Dr Singh  Will defer CT Angio to GI - pt allergic to IV Contrast (severe hives, dyspnea, as per pt)  Cont Bronchodilators  Cont other meds as listed  DVT prophylaxis with IPC (pt w/ active bleed)      IMPROVE VTE Individual Risk Assessment  RISK                                                                Points  [  ] Previous VTE                                                  3  [  ] Thrombophilia                                               2  [  ] Lower limb paralysis                                      2        (unable to hold up >15 seconds)    [  ] Current Cancer                                              2         (within 6 months)  [ x ] Immobilization > 24 hrs                                1  [  ] ICU/CCU stay > 24 hours                              1  [x  ] Age > 60                                                      1  IMPROVE VTE Score __1___  IMPROVE Score 0-1: Low Risk, No VTE prophylaxis required for most patients, encourage ambulation.   IMPROVE Score 2-3: At risk, pharmacologic VTE prophylaxis is indicated for most patients (in the absence of a contraindication)  IMPROVE Score > or = 4: High Risk, pharmacologic VTE prophylaxis is indicated for most patients (in the absence of a contraindication)

## 2019-04-23 NOTE — CONSULT NOTE ADULT - SUBJECTIVE AND OBJECTIVE BOX
INTERVAL HPI/OVERNIGHT EVENTS:  HPI:  59 yr old female PMHx of MVP, chronic SDH, interstitial lung disease/pneumothorax, pulmonary nodules, meniere's, non hodgkins lymphoma (SCD/XRT/chemo at [facility] 2003), TIA, tachycardia, occipital neuralgia, and neuropathy. GI consulted to see patient due to recent onset of GI bleed. Patient states when she was admitted to [facility] about a week or so ago she had some bleeding and that it resolved on its own. She denies abdominal pain but complains of some nausea. Last colonoscopy was over 15 years ago, she is unsure of who did it and believe they found some polyps at that time.       MEDICATIONS  (STANDING):  ALPRAZolam 0.5 milliGRAM(s) Oral three times a day  buDESOnide   0.5 milliGRAM(s) Respule 0.5 milliGRAM(s) Inhalation two times a day  cholecalciferol 2000 Unit(s) Oral daily  cyanocobalamin 1000 MICROGram(s) Oral daily  diVALproex  milliGRAM(s) Oral two times a day  influenza   Vaccine 0.5 milliLiter(s) IntraMuscular once  lactated ringers. 1000 milliLiter(s) (100 mL/Hr) IV Continuous <Continuous>  lidocaine   Patch 1 Patch Transdermal daily  metoprolol tartrate 12.5 milliGRAM(s) Oral two times a day  ondansetron Injectable 4 milliGRAM(s) IV Push once  pantoprazole  Injectable 40 milliGRAM(s) IV Push every 12 hours  tiotropium 18 MICROgram(s) Capsule 1 Capsule(s) Inhalation daily  zinc oxide 20% Ointment 1 Application(s) Topical daily    MEDICATIONS  (PRN):  acetaminophen   Tablet .. 650 milliGRAM(s) Oral every 6 hours PRN Temp greater or equal to 38C (100.4F), Mild Pain (1 - 3)  ALBUTerol/ipratropium for Nebulization 3 milliLiter(s) Nebulizer every 6 hours PRN Shortness of Breath and/or Wheezing  melatonin 3 milliGRAM(s) Oral at bedtime PRN Sleep  ondansetron   Disintegrating Tablet 4 milliGRAM(s) Oral every 6 hours PRN Nausea and/or Vomiting  traMADol 50 milliGRAM(s) Oral three times a day PRN Severe Pain (7 - 10)      Allergies    IV Contrast (Anaphylaxis)  shellfish. (Anaphylaxis)    Intolerances        PHYSICAL EXAM:   Vital Signs:  Vital Signs Last 24 Hrs  T(C): 36.7 (23 Apr 2019 08:35), Max: 37 (22 Apr 2019 20:53)  T(F): 98 (23 Apr 2019 08:35), Max: 98.6 (22 Apr 2019 20:53)  HR: 86 (23 Apr 2019 09:22) (57 - 119)  BP: 100/65 (23 Apr 2019 08:35) (90/60 - 106/71)  BP(mean): --  RR: 16 (23 Apr 2019 08:35) (14 - 16)  SpO2: 94% (23 Apr 2019 09:22) (94% - 99%)  Daily Height in cm: 175.26 (23 Apr 2019 05:55)    Daily I&O's Summary      GENERAL:  Appears stated age,  HEENT:  NC/AT,  conjunctivae clear and pink  CHEST:  Full & symmetric excursion, no increased effort, breath sounds clear  HEART:  Regular rhythm, S1, S2  ABDOMEN:  Soft, non-tender, non-distended, normoactive bowel sounds  EXTEREMITIES:  no edema, R side weakness  SKIN:  No rash, warm/dry  NEURO:  Alert, oriented      LABS:                        9.9    x     )-----------( x        ( 23 Apr 2019 11:15 )             31.3     04-23    140  |  101  |  27<H>  ----------------------------<  110<H>  4.5   |  30  |  0.79    Ca    9.4      23 Apr 2019 04:55      PT/INR - ( 23 Apr 2019 04:55 )   PT: 10.8 sec;   INR: 0.97 ratio             amylase   lipase  RADIOLOGY & ADDITIONAL TESTS: INTERVAL HPI/OVERNIGHT EVENTS:  HPI:  59 yr old female PMHx of MVP, chronic SDH, interstitial lung disease/pneumothorax, pulmonary nodules, meniere's, non hodgkins lymphoma (SCD/XRT/chemo at [facility] 2003), TIA, tachycardia, occipital neuralgia, and neuropathy. GI consulted to see patient due to recent onset of GI bleed. Patient states when she was admitted to [facility] about a week or so ago she had some bleeding and that it resolved on its own. She denies abdominal pain but complains of some nausea. Last colonoscopy was over 15 years ago, she is unsure of who did it and believe they found some polyps at that time. She was recently on aspirin and heparin. Plavix was discontinued last month.       MEDICATIONS  (STANDING):  ALPRAZolam 0.5 milliGRAM(s) Oral three times a day  buDESOnide   0.5 milliGRAM(s) Respule 0.5 milliGRAM(s) Inhalation two times a day  cholecalciferol 2000 Unit(s) Oral daily  cyanocobalamin 1000 MICROGram(s) Oral daily  diVALproex  milliGRAM(s) Oral two times a day  influenza   Vaccine 0.5 milliLiter(s) IntraMuscular once  lactated ringers. 1000 milliLiter(s) (100 mL/Hr) IV Continuous <Continuous>  lidocaine   Patch 1 Patch Transdermal daily  metoprolol tartrate 12.5 milliGRAM(s) Oral two times a day  ondansetron Injectable 4 milliGRAM(s) IV Push once  pantoprazole  Injectable 40 milliGRAM(s) IV Push every 12 hours  tiotropium 18 MICROgram(s) Capsule 1 Capsule(s) Inhalation daily  zinc oxide 20% Ointment 1 Application(s) Topical daily    MEDICATIONS  (PRN):  acetaminophen   Tablet .. 650 milliGRAM(s) Oral every 6 hours PRN Temp greater or equal to 38C (100.4F), Mild Pain (1 - 3)  ALBUTerol/ipratropium for Nebulization 3 milliLiter(s) Nebulizer every 6 hours PRN Shortness of Breath and/or Wheezing  melatonin 3 milliGRAM(s) Oral at bedtime PRN Sleep  ondansetron   Disintegrating Tablet 4 milliGRAM(s) Oral every 6 hours PRN Nausea and/or Vomiting  traMADol 50 milliGRAM(s) Oral three times a day PRN Severe Pain (7 - 10)      Allergies    IV Contrast (Anaphylaxis)  shellfish. (Anaphylaxis)    Intolerances        PHYSICAL EXAM:   Vital Signs:  Vital Signs Last 24 Hrs  T(C): 36.7 (23 Apr 2019 08:35), Max: 37 (22 Apr 2019 20:53)  T(F): 98 (23 Apr 2019 08:35), Max: 98.6 (22 Apr 2019 20:53)  HR: 86 (23 Apr 2019 09:22) (57 - 119)  BP: 100/65 (23 Apr 2019 08:35) (90/60 - 106/71)  BP(mean): --  RR: 16 (23 Apr 2019 08:35) (14 - 16)  SpO2: 94% (23 Apr 2019 09:22) (94% - 99%)  Daily Height in cm: 175.26 (23 Apr 2019 05:55)    Daily I&O's Summary      GENERAL:  Appears stated age,  HEENT:  NC/AT,  conjunctivae clear and pink  CHEST:  Full & symmetric excursion, no increased effort, breath sounds clear  HEART:  Regular rhythm, S1, S2  ABDOMEN:  Soft, non-tender, non-distended, normoactive bowel sounds  EXTEREMITIES:  no edema, R side weakness  SKIN:  No rash, warm/dry  NEURO:  Alert, oriented      LABS:                        9.9    x     )-----------( x        ( 23 Apr 2019 11:15 )             31.3     04-23    140  |  101  |  27<H>  ----------------------------<  110<H>  4.5   |  30  |  0.79    Ca    9.4      23 Apr 2019 04:55      PT/INR - ( 23 Apr 2019 04:55 )   PT: 10.8 sec;   INR: 0.97 ratio             amylase   lipase  RADIOLOGY & ADDITIONAL TESTS:

## 2019-04-23 NOTE — PROVIDER CONTACT NOTE (OTHER) - ASSESSMENT
Alert, verbally responsive. c/o pain in rectal area. VS /71 T- 98.1 HR- 116 RR-15 O2 Sat RA p96%
medications discussed w/pt today pt asked about the allergy to aspirin in front of daughter pt stated she does not and did not have an allergy to aspirin.  pt took aspirin and no negative reaction noted.

## 2019-04-23 NOTE — PROGRESS NOTE ADULT - SUBJECTIVE AND OBJECTIVE BOX
59y year old Female admitted for Patient is a 59y old  Female who presents with a chief complaint of Rectal bleed (23 Apr 2019 15:15)        60 Yo f with history of MVP, chronic subdural hematoma, interstitial lung disease pneumothorax seizures, pulmonary nodules Meniere's disease, non hodgkin's lymphoma s/p SCD/XRT/Chemo 2003, TIA, tachycardia, occipital neuralgia, neuropathy, recently admitted to [facility] on 4/19, was admitted to telemetry from rehab for bloody BMs, while in tele became hypotensive and was thus admitted to ICU.  BP documented as low as 61/49, was treated with IVF and ordered for 2 units PRBC, 2FFP and 1 plt, at this time 1 unit of PRBCs has been given second is being transfused now, MAP is 70s.  She has had at least 7 bloody burgundy colored BMs since this morning and continues to have bloody BMs with clots this evening while drinking MOVI prep for planned endoscopy/colonoscopy tomorrow.  She currently is complaining of body aches which she has been having, denies abdominal pain, chest pain, light handedness fever, chills, Nausea or vomiting.                PMH:  Other ulcerative colitis with rectal bleeding  Undefined  Family history of stroke  Family history of breast cancer  Handoff  MEWS Score  Interstitial lung disease  NHL (non-Hodgkin's lymphoma)  Transient cerebral ischemia, unspecified type  Mitral prolapse  Pulmonary disease  Rectal bleed  Rectal bleed  History of tonsillectomy  History of appendectomy        MEDICATIONS  (STANDING):  ALPRAZolam 0.5 milliGRAM(s) Oral three times a day  buDESOnide   0.5 milliGRAM(s) Respule 0.5 milliGRAM(s) Inhalation two times a day  influenza   Vaccine 0.5 milliLiter(s) IntraMuscular once  lactated ringers. 1000 milliLiter(s) (100 mL/Hr) IV Continuous <Continuous>  lidocaine   Patch 1 Patch Transdermal daily  pantoprazole  Injectable 40 milliGRAM(s) IV Push every 12 hours  tiotropium 18 MICROgram(s) Capsule 1 Capsule(s) Inhalation daily  valproate sodium IVPB 500 milliGRAM(s) IV Intermittent every 12 hours  zinc oxide 20% Ointment 1 Application(s) Topical daily    MEDICATIONS  (PRN):  acetaminophen   Tablet .. 650 milliGRAM(s) Oral every 6 hours PRN Temp greater or equal to 38C (100.4F), Mild Pain (1 - 3)  ALBUTerol/ipratropium for Nebulization 3 milliLiter(s) Nebulizer every 6 hours PRN Shortness of Breath and/or Wheezing      I&O's Summary    23 Apr 2019 07:01  -  24 Apr 2019 04:03  --------------------------------------------------------  IN: 3376 mL / OUT: 8 mL / NET: 3368 mL      ICU Vital Signs Last 24 Hrs  T(C): 36.4 (24 Apr 2019 02:00), Max: 36.9 (23 Apr 2019 21:00)  T(F): 97.5 (24 Apr 2019 02:00), Max: 98.5 (23 Apr 2019 21:00)  HR: 82 (24 Apr 2019 03:00) (57 - 119)  BP: 96/62 (24 Apr 2019 03:00) (61/49 - 113/66)  BP(mean): 96 (24 Apr 2019 02:00) (54 - 96)  ABP: --  ABP(mean): --  RR: 15 (24 Apr 2019 03:00) (14 - 27)  SpO2: 95% (24 Apr 2019 03:00) (81% - 100%)      PHYSICAL EXAM:  General: NAD conversant  HEENT: NCAT EOMI, non icteric sclera, no epistaxis  CV: s1s2 RRR no murmurs, gallops clicks or rubs  pulm: Clear bilaterally no wheezing rales or rhonchi  Skin/nails: she is pale, no rashes, cyanosis, no clubbing   GI: soft NTND, positive BS, no hepatomegaly no splenomegaly  Extremities: no peripheral edema      04-23    140  |  105  |  16  ----------------------------<  95  4.3   |  27  |  0.66    Ca    8.3<L>      23 Apr 2019 21:24  Phos  4.0     04-23  Mg     1.7     04-23    TPro  6.3  /  Alb  2.6<L>  /  TBili  0.2  /  DBili  x   /  AST  21  /  ALT  19  /  AlkPhos  74  04-23                          11.0   8.7   )-----------( 273      ( 23 Apr 2019 21:24 )             32.6                    Assessment:  1.  2.  3.    Plan:    25 min spent face to face with patient 59y year old Female admitted for Patient is a 59y old  Female who presents with a chief complaint of Rectal bleed (23 Apr 2019 15:15)        58 Yo f with history of MVP, chronic subdural hematoma, interstitial lung disease pneumothorax seizures, pulmonary nodules Meniere's disease, non hodgkin's lymphoma s/p SCD/XRT/Chemo 2003, TIA, tachycardia, occipital neuralgia, neuropathy, recently admitted to [place]  [facility] on 4/19, was admitted to telemetry from rehab for bloody BMs, while in tele became hypotensive and was thus admitted to ICU.  BP documented as low as 61/49, was treated with IVF and ordered for 2 units PRBC, 2FFP and 1 plt, at this time 1 unit of PRBCs has been given second is being transfused now, MAP is 70s.  She has had at least 7 bloody burgundy colored BMs since this morning and continues to have bloody BMs with clots this evening while drinking MOVI prep for planned endoscopy/colonoscopy tomorrow.  She currently is complaining of body aches which she has been having, denies abdominal pain, chest pain, light handedness fever, chills, Nausea or vomiting.      ROS  general: +body aches  Cardio: no CP, palpitations  PUlm: +wheezing, intermittent SOB  GI: + blood BMs, no abdominal pain, no nausea/vomiting/hematemasis                 PMH:  Other ulcerative colitis with rectal bleeding  Undefined  Family history of stroke  Family history of breast cancer  Handoff  MEWS Score  Interstitial lung disease  NHL (non-Hodgkin's lymphoma)  Transient cerebral ischemia, unspecified type  Mitral prolapse  Pulmonary disease  Rectal bleed  Rectal bleed  History of tonsillectomy  History of appendectomy        MEDICATIONS  (STANDING):  ALPRAZolam 0.5 milliGRAM(s) Oral three times a day  buDESOnide   0.5 milliGRAM(s) Respule 0.5 milliGRAM(s) Inhalation two times a day  influenza   Vaccine 0.5 milliLiter(s) IntraMuscular once  lactated ringers. 1000 milliLiter(s) (100 mL/Hr) IV Continuous <Continuous>  lidocaine   Patch 1 Patch Transdermal daily  pantoprazole  Injectable 40 milliGRAM(s) IV Push every 12 hours  tiotropium 18 MICROgram(s) Capsule 1 Capsule(s) Inhalation daily  valproate sodium IVPB 500 milliGRAM(s) IV Intermittent every 12 hours  zinc oxide 20% Ointment 1 Application(s) Topical daily    MEDICATIONS  (PRN):  acetaminophen   Tablet .. 650 milliGRAM(s) Oral every 6 hours PRN Temp greater or equal to 38C (100.4F), Mild Pain (1 - 3)  ALBUTerol/ipratropium for Nebulization 3 milliLiter(s) Nebulizer every 6 hours PRN Shortness of Breath and/or Wheezing      I&O's Summary    23 Apr 2019 07:01  -  24 Apr 2019 04:03  --------------------------------------------------------  IN: 3376 mL / OUT: 8 mL / NET: 3368 mL      ICU Vital Signs Last 24 Hrs  T(C): 36.4 (24 Apr 2019 02:00), Max: 36.9 (23 Apr 2019 21:00)  T(F): 97.5 (24 Apr 2019 02:00), Max: 98.5 (23 Apr 2019 21:00)  HR: 82 (24 Apr 2019 03:00) (57 - 119)  BP: 96/62 (24 Apr 2019 03:00) (61/49 - 113/66)  BP(mean): 96 (24 Apr 2019 02:00) (54 - 96)  ABP: --  ABP(mean): --  RR: 15 (24 Apr 2019 03:00) (14 - 27)  SpO2: 95% (24 Apr 2019 03:00) (81% - 100%)      PHYSICAL EXAM:  General: NAD conversant  Neck: no JVD, supple  HEENT: NCAT EOMI, non icteric sclera, no epistaxis  CV: s1s2 RRR no murmurs, gallops clicks or rubs  pulm: Clear bilaterally no wheezing rales or rhonchi  Skin: she is pale, no rashes, cyanosis,  Nails:  no clubbing  GI: soft NTND, positive BS, no hepatomegaly no splenomegaly. large Maroonish BM with clots in diaper  Extremities: no peripheral edema, warm  NEuro: AAOX 3, left arm plegic, 5/5 strength RUE, BLE.    POCUS: A lines no B lines on pulm exam. difficult to visualize IVC      04-23    140  |  105  |  16  ----------------------------<  95  4.3   |  27  |  0.66    Ca    8.3<L>      23 Apr 2019 21:24  Phos  4.0     04-23  Mg     1.7     04-23    TPro  6.3  /  Alb  2.6<L>  /  TBili  0.2  /  DBili  x   /  AST  21  /  ALT  19  /  AlkPhos  74  04-23                          11.0   8.7   )-----------( 273      ( 23 Apr 2019 21:24 )             32.6                         58 Yo f with history of MVP, chronic subdural hematoma, interstitial lung disease pneumothorax seizures, pulmonary nodules Meniere's disease, non hodgkin's lymphoma s/p SCD/XRT/Chemo 2003, TIA, tachycardia, occipital neuralgia, neuropathy, recently admitted to [facility] on 4/19, had RRT for bloody BMs this morning and was admitted to Tele from rehab, then became hypotensive and was upgraded to ICU, She was ordered for 2 PRBC, 2 FFP, 1 plt (given recent asa), second unit PRBC recently finished, repeat hgb 11 from 10 despite 2 unit transfusion  SHe is drinking mobi prep and is having frequent bloody BMs, difficult to say if active bleeding vs prep but inappropriate hgb rise suggests she is bleeding.  She remains tachycardic 110s which is her base line, SBP  with MAPs >65, lactic acid normal.    - continue serial CBCs, will transfuse for hgb <7 or hemodynamic instability.   - maintain map >65  - Protonix IV BID  - she reports severe allergy to IV contrast and refused CT angiogram  - to received plts and FFP, aspirin held  - start IVF LR @ 100/H  - GI consulted- She is drinking movi prep for colonoscopy/endoscopy in am  - keep active T and S  - continue nebs and budesonide  for pulmonary fibrosis  - continue valproate for seizures  -SCD for DVT ppx  - monitor closely in ICU  -

## 2019-04-23 NOTE — DISCHARGE NOTE PROVIDER - HOSPITAL COURSE
60 yo female with PMHx of MVP, chronic SDH, interstitial lung disease/pneumothorax, pulmonary nodules, meniere's, non hodgkins lymphoma (SCD/XRT/chemo at [facility] 2003), TIA, tachycardia, occipital neuralgia, and neuropathy.  Patient was initially admitted to [facility] on 3/13/19 with acute onset of b/l UE weakness associated with nausea, blurry vision, and HA. CT head showed chronic B/L frontal SDH. The rest of the workup was negative. CVA/TIA r/out.  Pt was discharged home but on 3/17 had apparent seizure.  She was admitted to [facility] and intubated and placed on depakote.  CTA H/N, MRI, EEG unremarkable.  ID Recommended LP for fever; family deferred.  Transferred to [facility] on 3/19/19.  MRI brain showed cerebral ischemia. The rest of the workup was negative, this including CSF studies. EEG neg seizures. (Evaluated by lymphoma specialist/onco, PET 4/5/19 r/o recurrence of lymphoma. Increased signal at base of tongue to follow up outpt ENT). Additionally, course complicated with pneumothorax, pulmonary consulted, no interventions, self resolved.  Tachycardia - per pulm, related related to chronic lung disease.  GI consulted for rectal pain; diagnosed c anal fissure/constipation, bowel regimen started. KUB 4/12/19 No stool, no free air, no distended loops of small bowel, fibroids. Refused further workup.     Other issues while admitted:    Hypotension - home diltiazem held per cardio    dizziness - sporadic, self resolving.  Pt deferred MRI.  May be 2/2 to hx of Meniere's disease.        *TTE 3/18/19 EF 60%, mod-severe MR.     MRI brain 3/29/19 cortically based restricted diffusion within R>L frontoparietal region as well as additional scattered small foci, may represent evolution of a recent ischemia vs improving encephalitis. 58 yo female with PMHx of MVP, chronic SDH, interstitial lung disease/pneumothorax, pulmonary nodules, meniere's, non hodgkins lymphoma (SCD/XRT/chemo at [facility] 2003), TIA, tachycardia, occipital neuralgia, and neuropathy.  Patient was initially admitted to [facility] on 3/13/19 with acute onset of b/l UE weakness associated with nausea, blurry vision, and HA. CT head showed chronic B/L frontal SDH. The rest of the workup was negative. CVA/TIA r/out.  Pt was discharged home but on 3/17 had apparent seizure.  She was admitted to [facility] and intubated and placed on depakote.  CTA H/N, MRI, EEG unremarkable.  ID Recommended LP for fever; family deferred.  Transferred to [facility] on 3/19/19.  MRI brain showed cerebral ischemia. The rest of the workup was negative, this including CSF studies. EEG neg seizures. (Evaluated by lymphoma specialist/onco, PET 4/5/19 r/o recurrence of lymphoma. Increased signal at base of tongue to follow up outpt ENT). Additionally, course complicated with pneumothorax, pulmonary consulted, no interventions, self resolved.  Tachycardia - per pulm, related related to chronic lung disease.  GI consulted for rectal pain; diagnosed c anal fissure/constipation, bowel regimen started. KUB 4/12/19 No stool, no free air, no distended loops of small bowel, fibroids. Refused further workup.     Other issues while admitted:    Hypotension - home diltiazem held per cardio    dizziness - sporadic, self resolving.  Pt deferred MRI.  May be 2/2 to hx of Meniere's disease.        *TTE 3/18/19 EF 60%, mod-severe MR.     MRI brain 3/29/19 cortically based restricted diffusion within R>L frontoparietal region as well as additional scattered small foci, may represent evolution of a recent ischemia vs improving encephalitis.    Patient was admitted to acute inpatient rehab on 4/23      ACUTE Inpatient Rehab hosp course        on 4/23 at 4:29AM RR was called for patient with large bloody bowel movement with decrease in blood pressure.     Vitals: SBP 90/60  RR 14 SPO2 98% RA and Temp 98.1 oral. Patient continued to have goldy red blood per rectum with some clots. SHe was started on protonix stat and stat labs were drawn including type and screen. Patient was started on IVF. Aspirin and heparin were discontinued. Given continuous bright red blood per rectum, patient is transferred to telemetry for closer clinical monitoring.

## 2019-04-24 DIAGNOSIS — G93.9 DISORDER OF BRAIN, UNSPECIFIED: ICD-10-CM

## 2019-04-24 LAB
ANION GAP SERPL CALC-SCNC: 9 MMOL/L — SIGNIFICANT CHANGE UP (ref 5–17)
BUN SERPL-MCNC: 11 MG/DL — SIGNIFICANT CHANGE UP (ref 7–23)
CALCIUM SERPL-MCNC: 9.1 MG/DL — SIGNIFICANT CHANGE UP (ref 8.4–10.5)
CHLORIDE SERPL-SCNC: 105 MMOL/L — SIGNIFICANT CHANGE UP (ref 96–108)
CO2 SERPL-SCNC: 28 MMOL/L — SIGNIFICANT CHANGE UP (ref 22–31)
CREAT SERPL-MCNC: 0.64 MG/DL — SIGNIFICANT CHANGE UP (ref 0.5–1.3)
GLUCOSE SERPL-MCNC: 91 MG/DL — SIGNIFICANT CHANGE UP (ref 70–99)
HCT VFR BLD CALC: 24.4 % — LOW (ref 34.5–45)
HCT VFR BLD CALC: 27.7 % — LOW (ref 34.5–45)
HCT VFR BLD CALC: 28 % — LOW (ref 34.5–45)
HCT VFR BLD CALC: 30.8 % — LOW (ref 34.5–45)
HGB BLD-MCNC: 10.4 G/DL — LOW (ref 11.5–15.5)
HGB BLD-MCNC: 8 G/DL — LOW (ref 11.5–15.5)
HGB BLD-MCNC: 9.3 G/DL — LOW (ref 11.5–15.5)
HGB BLD-MCNC: 9.7 G/DL — LOW (ref 11.5–15.5)
MAGNESIUM SERPL-MCNC: 2.4 MG/DL — SIGNIFICANT CHANGE UP (ref 1.6–2.6)
MCHC RBC-ENTMCNC: 29.4 PG — SIGNIFICANT CHANGE UP (ref 27–34)
MCHC RBC-ENTMCNC: 30.2 PG — SIGNIFICANT CHANGE UP (ref 27–34)
MCHC RBC-ENTMCNC: 31 PG — SIGNIFICANT CHANGE UP (ref 27–34)
MCHC RBC-ENTMCNC: 31.2 PG — SIGNIFICANT CHANGE UP (ref 27–34)
MCHC RBC-ENTMCNC: 32.6 GM/DL — SIGNIFICANT CHANGE UP (ref 32–36)
MCHC RBC-ENTMCNC: 33.2 GM/DL — SIGNIFICANT CHANGE UP (ref 32–36)
MCHC RBC-ENTMCNC: 33.9 GM/DL — SIGNIFICANT CHANGE UP (ref 32–36)
MCHC RBC-ENTMCNC: 34.9 GM/DL — SIGNIFICANT CHANGE UP (ref 32–36)
MCV RBC AUTO: 88.6 FL — SIGNIFICANT CHANGE UP (ref 80–100)
MCV RBC AUTO: 88.7 FL — SIGNIFICANT CHANGE UP (ref 80–100)
MCV RBC AUTO: 91.9 FL — SIGNIFICANT CHANGE UP (ref 80–100)
MCV RBC AUTO: 92.9 FL — SIGNIFICANT CHANGE UP (ref 80–100)
PHOSPHATE SERPL-MCNC: 3.4 MG/DL — SIGNIFICANT CHANGE UP (ref 2.5–4.5)
PLATELET # BLD AUTO: 208 K/UL — SIGNIFICANT CHANGE UP (ref 150–400)
PLATELET # BLD AUTO: 220 K/UL — SIGNIFICANT CHANGE UP (ref 150–400)
PLATELET # BLD AUTO: 275 K/UL — SIGNIFICANT CHANGE UP (ref 150–400)
PLATELET # BLD AUTO: 303 K/UL — SIGNIFICANT CHANGE UP (ref 150–400)
POTASSIUM SERPL-MCNC: 3.6 MMOL/L — SIGNIFICANT CHANGE UP (ref 3.5–5.3)
POTASSIUM SERPL-SCNC: 3.6 MMOL/L — SIGNIFICANT CHANGE UP (ref 3.5–5.3)
RBC # BLD: 2.63 M/UL — LOW (ref 3.8–5.2)
RBC # BLD: 3.12 M/UL — LOW (ref 3.8–5.2)
RBC # BLD: 3.15 M/UL — LOW (ref 3.8–5.2)
RBC # BLD: 3.35 M/UL — LOW (ref 3.8–5.2)
RBC # FLD: 13.4 % — SIGNIFICANT CHANGE UP (ref 10.3–14.5)
RBC # FLD: 13.5 % — SIGNIFICANT CHANGE UP (ref 10.3–14.5)
RBC # FLD: 14.2 % — SIGNIFICANT CHANGE UP (ref 10.3–14.5)
RBC # FLD: 14.6 % — HIGH (ref 10.3–14.5)
SODIUM SERPL-SCNC: 142 MMOL/L — SIGNIFICANT CHANGE UP (ref 135–145)
WBC # BLD: 10 K/UL — SIGNIFICANT CHANGE UP (ref 3.8–10.5)
WBC # BLD: 11.1 K/UL — HIGH (ref 3.8–10.5)
WBC # BLD: 7.2 K/UL — SIGNIFICANT CHANGE UP (ref 3.8–10.5)
WBC # BLD: 7.4 K/UL — SIGNIFICANT CHANGE UP (ref 3.8–10.5)
WBC # FLD AUTO: 10 K/UL — SIGNIFICANT CHANGE UP (ref 3.8–10.5)
WBC # FLD AUTO: 11.1 K/UL — HIGH (ref 3.8–10.5)
WBC # FLD AUTO: 7.2 K/UL — SIGNIFICANT CHANGE UP (ref 3.8–10.5)
WBC # FLD AUTO: 7.4 K/UL — SIGNIFICANT CHANGE UP (ref 3.8–10.5)

## 2019-04-24 PROCEDURE — 74176 CT ABD & PELVIS W/O CONTRAST: CPT | Mod: 26

## 2019-04-24 PROCEDURE — 99233 SBSQ HOSP IP/OBS HIGH 50: CPT

## 2019-04-24 PROCEDURE — 99223 1ST HOSP IP/OBS HIGH 75: CPT

## 2019-04-24 RX ORDER — PHENYLEPHRINE HYDROCHLORIDE 10 MG/ML
0.2 INJECTION INTRAVENOUS
Qty: 160 | Refills: 0 | Status: DISCONTINUED | OUTPATIENT
Start: 2019-04-24 | End: 2019-04-28

## 2019-04-24 RX ORDER — METOCLOPRAMIDE HCL 10 MG
10 TABLET ORAL ONCE
Qty: 0 | Refills: 0 | Status: COMPLETED | OUTPATIENT
Start: 2019-04-24 | End: 2019-04-24

## 2019-04-24 RX ORDER — SODIUM CHLORIDE 9 MG/ML
250 INJECTION, SOLUTION INTRAVENOUS ONCE
Qty: 0 | Refills: 0 | Status: COMPLETED | OUTPATIENT
Start: 2019-04-24 | End: 2019-04-24

## 2019-04-24 RX ORDER — ONDANSETRON 8 MG/1
4 TABLET, FILM COATED ORAL EVERY 6 HOURS
Qty: 0 | Refills: 0 | Status: DISCONTINUED | OUTPATIENT
Start: 2019-04-24 | End: 2019-05-09

## 2019-04-24 RX ORDER — SODIUM CHLORIDE 9 MG/ML
1000 INJECTION, SOLUTION INTRAVENOUS ONCE
Qty: 0 | Refills: 0 | Status: COMPLETED | OUTPATIENT
Start: 2019-04-24 | End: 2019-04-24

## 2019-04-24 RX ADMIN — Medication 3 MILLILITER(S): at 04:34

## 2019-04-24 RX ADMIN — SODIUM CHLORIDE 100 MILLILITER(S): 9 INJECTION, SOLUTION INTRAVENOUS at 06:05

## 2019-04-24 RX ADMIN — Medication 650 MILLIGRAM(S): at 07:34

## 2019-04-24 RX ADMIN — PANTOPRAZOLE SODIUM 40 MILLIGRAM(S): 20 TABLET, DELAYED RELEASE ORAL at 05:02

## 2019-04-24 RX ADMIN — Medication 650 MILLIGRAM(S): at 21:33

## 2019-04-24 RX ADMIN — ONDANSETRON 4 MILLIGRAM(S): 8 TABLET, FILM COATED ORAL at 18:08

## 2019-04-24 RX ADMIN — Medication 27.5 MILLIGRAM(S): at 05:03

## 2019-04-24 RX ADMIN — SODIUM CHLORIDE 1000 MILLILITER(S): 9 INJECTION, SOLUTION INTRAVENOUS at 13:55

## 2019-04-24 RX ADMIN — SODIUM CHLORIDE 500 MILLILITER(S): 9 INJECTION, SOLUTION INTRAVENOUS at 03:48

## 2019-04-24 RX ADMIN — Medication 100 GRAM(S): at 00:54

## 2019-04-24 RX ADMIN — Medication 650 MILLIGRAM(S): at 00:48

## 2019-04-24 RX ADMIN — ONDANSETRON 4 MILLIGRAM(S): 8 TABLET, FILM COATED ORAL at 23:30

## 2019-04-24 RX ADMIN — ZINC OXIDE 1 APPLICATION(S): 200 OINTMENT TOPICAL at 10:49

## 2019-04-24 RX ADMIN — Medication 10 MILLIGRAM(S): at 20:48

## 2019-04-24 RX ADMIN — Medication 650 MILLIGRAM(S): at 08:35

## 2019-04-24 RX ADMIN — Medication 27.5 MILLIGRAM(S): at 17:22

## 2019-04-24 RX ADMIN — Medication 650 MILLIGRAM(S): at 22:34

## 2019-04-24 RX ADMIN — PANTOPRAZOLE SODIUM 40 MILLIGRAM(S): 20 TABLET, DELAYED RELEASE ORAL at 17:22

## 2019-04-24 RX ADMIN — PHENYLEPHRINE HYDROCHLORIDE 2.17 MICROGRAM(S)/KG/MIN: 10 INJECTION INTRAVENOUS at 14:11

## 2019-04-24 NOTE — PROGRESS NOTE ADULT - SUBJECTIVE AND OBJECTIVE BOX
INTERVAL HPI/OVERNIGHT EVENTS:  HPI:  60 y/o female PMHx of MVP, chronic SDH, interstitial lung disease/pneumothorax, pulmonary nodules, meniere's, non hodgkins lymphoma (SCD/XRT/chemo at [facility] ), TIA, tachycardia, occipital neuralgia, and neuropathy. GI consulted to see patient due to recent onset of GI bleed. Patient states when she was admitted to [facility] about a week or so ago she had some bleeding and that it resolved on its own. Patient became hypotensive yesterday and was upgraded to CCU. She continues to have large, maroon and clotty bowel movements. She denies abdominal pain but complains of some nausea still.     MEDICATIONS  (STANDING):  ALPRAZolam 0.5 milliGRAM(s) Oral three times a day  buDESOnide   0.5 milliGRAM(s) Respule 0.5 milliGRAM(s) Inhalation two times a day  lactated ringers. 1000 milliLiter(s) (100 mL/Hr) IV Continuous <Continuous>  lidocaine   Patch 1 Patch Transdermal daily  pantoprazole  Injectable 40 milliGRAM(s) IV Push every 12 hours  tiotropium 18 MICROgram(s) Capsule 1 Capsule(s) Inhalation daily  valproate sodium IVPB 500 milliGRAM(s) IV Intermittent every 12 hours  zinc oxide 20% Ointment 1 Application(s) Topical daily    MEDICATIONS  (PRN):  acetaminophen   Tablet .. 650 milliGRAM(s) Oral every 6 hours PRN Temp greater or equal to 38C (100.4F), Mild Pain (1 - 3)  ALBUTerol/ipratropium for Nebulization 3 milliLiter(s) Nebulizer every 6 hours PRN Shortness of Breath and/or Wheezing  ondansetron Injectable 4 milliGRAM(s) IV Push every 6 hours PRN Nausea and/or Vomiting      Allergies    IV Contrast (Anaphylaxis)  shellfish. (Anaphylaxis)    Intolerances        PHYSICAL EXAM:   Vital Signs:  Vital Signs Last 24 Hrs  T(C): 36.8 (2019 08:00), Max: 36.9 (2019 21:00)  T(F): 98.3 (2019 08:00), Max: 98.5 (2019 21:00)  HR: 111 (2019 10:00) (82 - 118)  BP: 124/78 (2019 08:00) (61/49 - 124/78)  BP(mean): 92 (2019 08:00) (54 - 96)  RR: 17 (2019 10:00) (14 - 27)  SpO2: 98% (2019 10:00) (81% - 100%)  Daily     Daily Weight in k (2019 06:00)I&O's Summary    2019 07:01  -  2019 07:00  --------------------------------------------------------  IN: 4016 mL / OUT: 9 mL / NET: 4007 mL    2019 07:01  -  2019 11:21  --------------------------------------------------------  IN: 300 mL / OUT: 6 mL / NET: 294 mL      GENERAL:  Appears stated age,  HEENT:  NC/AT,  conjunctivae clear and pink  CHEST:  Full & symmetric excursion, no increased effort, breath sounds clear  HEART:  Regular rhythm, S1, S2  ABDOMEN:  Soft, non-tender, non-distended, normoactive bowel sounds  EXTEREMITIES:  no edema, R side weakness  SKIN:  No rash, warm/dry  NEURO:  Alert, oriented    LABS:                        10.4   7.2   )-----------( 303      ( 2019 05:00 )             30.8     04-24    142  |  105  |  11  ----------------------------<  91  3.6   |  28  |  0.64    Ca    9.1      2019 05:00  Phos  3.4     04-24  Mg     2.4     04-24    TPro  6.3  /  Alb  2.6<L>  /  TBili  0.2  /  DBili  x   /  AST  21  /  ALT  19  /  AlkPhos  74  04-23    PT/INR - ( 2019 15:20 )   PT: 10.6 sec;   INR: 0.95 ratio         PTT - ( 2019 15:20 )  PTT:31.2 sec    amylase   lipase  RADIOLOGY & ADDITIONAL TESTS: INTERVAL HPI/OVERNIGHT EVENTS:  HPI:  58 y/o female PMHx of MVP, chronic SDH, interstitial lung disease/pneumothorax, pulmonary nodules, meniere's, non hodgkins lymphoma (SCD/XRT/chemo at [facility] ), TIA, tachycardia, occipital neuralgia, and neuropathy. GI consulted to see patient due to recent onset of GI bleed. Patient states when she was admitted to [facility] about a week or so ago she had some bleeding and that it resolved on its own. Patient became hypotensive yesterday and was upgraded to CCU. She continues to have large, maroon and clotty bowel movements. She denies abdominal pain but complains of some nausea still.     MEDICATIONS  (STANDING):  ALPRAZolam 0.5 milliGRAM(s) Oral three times a day  buDESOnide   0.5 milliGRAM(s) Respule 0.5 milliGRAM(s) Inhalation two times a day  lactated ringers. 1000 milliLiter(s) (100 mL/Hr) IV Continuous <Continuous>  lidocaine   Patch 1 Patch Transdermal daily  pantoprazole  Injectable 40 milliGRAM(s) IV Push every 12 hours  tiotropium 18 MICROgram(s) Capsule 1 Capsule(s) Inhalation daily  valproate sodium IVPB 500 milliGRAM(s) IV Intermittent every 12 hours  zinc oxide 20% Ointment 1 Application(s) Topical daily    MEDICATIONS  (PRN):  acetaminophen   Tablet .. 650 milliGRAM(s) Oral every 6 hours PRN Temp greater or equal to 38C (100.4F), Mild Pain (1 - 3)  ALBUTerol/ipratropium for Nebulization 3 milliLiter(s) Nebulizer every 6 hours PRN Shortness of Breath and/or Wheezing  ondansetron Injectable 4 milliGRAM(s) IV Push every 6 hours PRN Nausea and/or Vomiting      Allergies    IV Contrast (Anaphylaxis)  shellfish. (Anaphylaxis)    Intolerances        PHYSICAL EXAM:   Vital Signs:  Vital Signs Last 24 Hrs  T(C): 36.8 (2019 08:00), Max: 36.9 (2019 21:00)  T(F): 98.3 (2019 08:00), Max: 98.5 (2019 21:00)  HR: 111 (2019 10:00) (82 - 118)  BP: 124/78 (2019 08:00) (61/49 - 124/78)  BP(mean): 92 (2019 08:00) (54 - 96)  RR: 17 (2019 10:00) (14 - 27)  SpO2: 98% (2019 10:00) (81% - 100%)  Daily     Daily Weight in k (2019 06:00)I&O's Summary    2019 07:01  -  2019 07:00  --------------------------------------------------------  IN: 4016 mL / OUT: 9 mL / NET: 4007 mL    2019 07:01  -  2019 11:21  --------------------------------------------------------  IN: 300 mL / OUT: 6 mL / NET: 294 mL      GENERAL:  Appears stated age,  HEENT:  NC/AT,  conjunctivae clear and pink  CHEST:  Full & symmetric excursion, no increased effort, breath sounds clear  HEART:  Regular rhythm, S1, S2  ABDOMEN:  Soft, non-tender, non-distended, normoactive bowel sounds  EXTEREMITIES:  no edema, L side weakness  SKIN:  No rash, warm/dry  NEURO:  Alert, oriented    LABS:                        10.4   7.2   )-----------( 303      ( 2019 05:00 )             30.8     04-24    142  |  105  |  11  ----------------------------<  91  3.6   |  28  |  0.64    Ca    9.1      2019 05:00  Phos  3.4     04-24  Mg     2.4     04-24    TPro  6.3  /  Alb  2.6<L>  /  TBili  0.2  /  DBili  x   /  AST  21  /  ALT  19  /  AlkPhos  74  04-23    PT/INR - ( 2019 15:20 )   PT: 10.6 sec;   INR: 0.95 ratio         PTT - ( 2019 15:20 )  PTT:31.2 sec    amylase   lipase  RADIOLOGY & ADDITIONAL TESTS:

## 2019-04-24 NOTE — PROGRESS NOTE ADULT - SUBJECTIVE AND OBJECTIVE BOX
Patient is a 59y old  Female who presents with a chief complaint of Rectal bleed (24 Apr 2019 11:21)      BRIEF HOSPITAL COURSE: ***    Events last 24 hours: ***    PAST MEDICAL & SURGICAL HISTORY:  Interstitial lung disease: on home o2 prn  NHL (non-Hodgkin's lymphoma): Agem 43 sp chemo/rt/stem cell  Transient cerebral ischemia, unspecified type  Mitral prolapse  History of tonsillectomy  History of appendectomy      Review of Systems:  CONSTITUTIONAL: No fever, chills, or fatigue  EYES: No eye pain, visual disturbances, or discharge  ENMT:  No difficulty hearing, tinnitus, vertigo; No sinus or throat pain  NECK: No pain or stiffness  RESPIRATORY: No cough, wheezing, chills or hemoptysis; No shortness of breath  CARDIOVASCULAR: No chest pain, palpitations, dizziness, or leg swelling  GASTROINTESTINAL: No abdominal or epigastric pain. No nausea, vomiting, or hematemesis; No diarrhea or constipation. No melena or hematochezia.  GENITOURINARY: No dysuria, frequency, hematuria, or incontinence  NEUROLOGICAL: No headaches, memory loss, loss of strength, numbness, or tremors  SKIN: No itching, burning, rashes, or lesions   MUSCULOSKELETAL: No joint pain or swelling; No muscle, back, or extremity pain  PSYCHIATRIC: No depression, anxiety, mood swings, or difficulty sleeping      Medications:    phenylephrine    Infusion 0.2 MICROgram(s)/kG/Min IV Continuous <Continuous>    ALBUTerol/ipratropium for Nebulization 3 milliLiter(s) Nebulizer every 6 hours PRN  buDESOnide   0.5 milliGRAM(s) Respule 0.5 milliGRAM(s) Inhalation two times a day  tiotropium 18 MICROgram(s) Capsule 1 Capsule(s) Inhalation daily    acetaminophen   Tablet .. 650 milliGRAM(s) Oral every 6 hours PRN  ALPRAZolam 0.5 milliGRAM(s) Oral three times a day  ondansetron Injectable 4 milliGRAM(s) IV Push every 6 hours PRN  valproate sodium IVPB 500 milliGRAM(s) IV Intermittent every 12 hours        pantoprazole  Injectable 40 milliGRAM(s) IV Push every 12 hours        lactated ringers. 1000 milliLiter(s) IV Continuous <Continuous>      lidocaine   Patch 1 Patch Transdermal daily  zinc oxide 20% Ointment 1 Application(s) Topical daily            ICU Vital Signs Last 24 Hrs  T(C): 36.7 (25 Apr 2019 04:15), Max: 37.6 (24 Apr 2019 21:00)  T(F): 98 (25 Apr 2019 04:15), Max: 99.7 (24 Apr 2019 21:00)  HR: 77 (25 Apr 2019 06:00) (61 - 138)  BP: 104/54 (25 Apr 2019 06:00) (56/44 - 134/83)  BP(mean): 69 (25 Apr 2019 06:00) (50 - 96)  ABP: --  ABP(mean): --  RR: 16 (25 Apr 2019 06:00) (16 - 34)  SpO2: 100% (25 Apr 2019 06:00) (76% - 100%)          I&O's Detail    23 Apr 2019 07:01  -  24 Apr 2019 07:00  --------------------------------------------------------  IN:    Lactated Ringers IV Bolus: 250 mL    lactated ringers.: 100 mL    lactated ringers.: 1000 mL    Oral Fluid: 1270 mL    Packed Red Blood Cells: 284 mL    Plasma: 643 mL    Platelets - Single Donor: 219 mL    Solution: 200 mL    Solution: 50 mL  Total IN: 4016 mL    OUT:    Stool: 9 mL  Total OUT: 9 mL    Total NET: 4007 mL      24 Apr 2019 07:01  -  25 Apr 2019 06:13  --------------------------------------------------------  IN:    Lactated Ringers IV Bolus: 1000 mL    lactated ringers.: 1500 mL    Packed Red Blood Cells: 277 mL    phenylephrine   Infusion: 248.2 mL  Total IN: 3025.2 mL    OUT:    Stool: 16 mL  Total OUT: 16 mL    Total NET: 3009.2 mL            LABS:                        9.4    12.4  )-----------( 221      ( 25 Apr 2019 04:40 )             27.5     04-25    141  |  104  |  9   ----------------------------<  96  3.8   |  27  |  0.58    Ca    8.7      25 Apr 2019 04:40  Phos  3.2     04-25  Mg     1.7     04-25    TPro  6.3  /  Alb  2.6<L>  /  TBili  0.2  /  DBili  x   /  AST  21  /  ALT  19  /  AlkPhos  74  04-23          CAPILLARY BLOOD GLUCOSE        PT/INR - ( 23 Apr 2019 15:20 )   PT: 10.6 sec;   INR: 0.95 ratio         PTT - ( 23 Apr 2019 15:20 )  PTT:31.2 sec    CULTURES:      Physical Examination:    General: No acute distress.  Alert, oriented, interactive, nonfocal    HEENT: Pupils equal, reactive to light.  Symmetric.    PULM: Clear to auscultation bilaterally, no significant sputum production    CVS: Regular rate and rhythm, no murmurs, rubs, or gallops    ABD: Soft, nondistended, nontender, normoactive bowel sounds, no masses    EXT: No edema, nontender    SKIN: Warm and well perfused, no rashes noted.    NEURO: A&Ox3, strength 5/5 all extremities, cranial nerves grossly intact, no focal deficits    RADIOLOGY: ***    CRITICAL CARE TIME SPENT: ***  Evaluating/treating patient, reviewing data/labs/imaging, discussing case with multidisciplinary team, discussing plan/goals of care with patient/family. Non-inclusive of procedure time. Patient is a 59y old  Female who presents with a chief complaint of Rectal bleed (24 Apr 2019 11:21)      BRIEF HOSPITAL COURSE: 58 y/o female pmhx of MVP, chronic subdural hematoma, interstitial lung disease pneumothorax seizures, pulmonary nodules, Meniere's disease, non hodgkin's lymphoma s/p SCD/XRT/Chemo 2003, TIA, tachycardia, occipital neuralgia, neuropathy, recently admitted to [place]  rehab on 4/19, was admitted to telemetry from rehab for bloody BMs, while in tele became hypotensive transferred to ICU. Was given 2 units PRBC, 2FFP and 1 plt, at this time.  Admitted to ICU   Events last 24 hours: ***    PAST MEDICAL & SURGICAL HISTORY:  Interstitial lung disease: on home o2 prn  NHL (non-Hodgkin's lymphoma): Agem 43 sp chemo/rt/stem cell  Transient cerebral ischemia, unspecified type  Mitral prolapse  History of tonsillectomy  History of appendectomy      Review of Systems:  CONSTITUTIONAL: No fever, chills, or fatigue  EYES: No eye pain, visual disturbances, or discharge  ENMT:  No difficulty hearing, tinnitus, vertigo; No sinus or throat pain  NECK: No pain or stiffness  RESPIRATORY: No cough, wheezing, chills or hemoptysis; No shortness of breath  CARDIOVASCULAR: No chest pain, palpitations, dizziness, or leg swelling  GASTROINTESTINAL: No abdominal or epigastric pain. No nausea, vomiting, or hematemesis; No diarrhea or constipation. No melena or hematochezia.  GENITOURINARY: No dysuria, frequency, hematuria, or incontinence  NEUROLOGICAL: No headaches, memory loss, loss of strength, numbness, or tremors  SKIN: No itching, burning, rashes, or lesions   MUSCULOSKELETAL: No joint pain or swelling; No muscle, back, or extremity pain  PSYCHIATRIC: No depression, anxiety, mood swings, or difficulty sleeping      Medications:    phenylephrine    Infusion 0.2 MICROgram(s)/kG/Min IV Continuous <Continuous>    ALBUTerol/ipratropium for Nebulization 3 milliLiter(s) Nebulizer every 6 hours PRN  buDESOnide   0.5 milliGRAM(s) Respule 0.5 milliGRAM(s) Inhalation two times a day  tiotropium 18 MICROgram(s) Capsule 1 Capsule(s) Inhalation daily    acetaminophen   Tablet .. 650 milliGRAM(s) Oral every 6 hours PRN  ALPRAZolam 0.5 milliGRAM(s) Oral three times a day  ondansetron Injectable 4 milliGRAM(s) IV Push every 6 hours PRN  valproate sodium IVPB 500 milliGRAM(s) IV Intermittent every 12 hours        pantoprazole  Injectable 40 milliGRAM(s) IV Push every 12 hours        lactated ringers. 1000 milliLiter(s) IV Continuous <Continuous>      lidocaine   Patch 1 Patch Transdermal daily  zinc oxide 20% Ointment 1 Application(s) Topical daily            ICU Vital Signs Last 24 Hrs  T(C): 36.7 (25 Apr 2019 04:15), Max: 37.6 (24 Apr 2019 21:00)  T(F): 98 (25 Apr 2019 04:15), Max: 99.7 (24 Apr 2019 21:00)  HR: 77 (25 Apr 2019 06:00) (61 - 138)  BP: 104/54 (25 Apr 2019 06:00) (56/44 - 134/83)  BP(mean): 69 (25 Apr 2019 06:00) (50 - 96)  ABP: --  ABP(mean): --  RR: 16 (25 Apr 2019 06:00) (16 - 34)  SpO2: 100% (25 Apr 2019 06:00) (76% - 100%)          I&O's Detail    23 Apr 2019 07:01  -  24 Apr 2019 07:00  --------------------------------------------------------  IN:    Lactated Ringers IV Bolus: 250 mL    lactated ringers.: 100 mL    lactated ringers.: 1000 mL    Oral Fluid: 1270 mL    Packed Red Blood Cells: 284 mL    Plasma: 643 mL    Platelets - Single Donor: 219 mL    Solution: 200 mL    Solution: 50 mL  Total IN: 4016 mL    OUT:    Stool: 9 mL  Total OUT: 9 mL    Total NET: 4007 mL      24 Apr 2019 07:01  -  25 Apr 2019 06:13  --------------------------------------------------------  IN:    Lactated Ringers IV Bolus: 1000 mL    lactated ringers.: 1500 mL    Packed Red Blood Cells: 277 mL    phenylephrine   Infusion: 248.2 mL  Total IN: 3025.2 mL    OUT:    Stool: 16 mL  Total OUT: 16 mL    Total NET: 3009.2 mL            LABS:                        9.4    12.4  )-----------( 221      ( 25 Apr 2019 04:40 )             27.5     04-25    141  |  104  |  9   ----------------------------<  96  3.8   |  27  |  0.58    Ca    8.7      25 Apr 2019 04:40  Phos  3.2     04-25  Mg     1.7     04-25    TPro  6.3  /  Alb  2.6<L>  /  TBili  0.2  /  DBili  x   /  AST  21  /  ALT  19  /  AlkPhos  74  04-23          CAPILLARY BLOOD GLUCOSE        PT/INR - ( 23 Apr 2019 15:20 )   PT: 10.6 sec;   INR: 0.95 ratio         PTT - ( 23 Apr 2019 15:20 )  PTT:31.2 sec    CULTURES:      Physical Examination:    General: No acute distress.  Alert, oriented, interactive, nonfocal    HEENT: Pupils equal, reactive to light.  Symmetric.    PULM: Clear to auscultation bilaterally, no significant sputum production    CVS: Regular rate and rhythm, no murmurs, rubs, or gallops    ABD: Soft, nondistended, nontender, normoactive bowel sounds, no masses    EXT: No edema, nontender    SKIN: Warm and well perfused, no rashes noted.    NEURO: A&Ox3, strength 5/5 all extremities, cranial nerves grossly intact, no focal deficits    RADIOLOGY: ***    CRITICAL CARE TIME SPENT: ***  Evaluating/treating patient, reviewing data/labs/imaging, discussing case with multidisciplinary team, discussing plan/goals of care with patient/family. Non-inclusive of procedure time. Patient is a 59y old  Female who presents with a chief complaint of Rectal bleed (24 Apr 2019 11:21)      BRIEF HOSPITAL COURSE: 58 y/o female pmhx of MVP, chronic subdural hematoma, interstitial lung disease pneumothorax seizures, pulmonary nodules, Meniere's disease, non hodgkin's lymphoma s/p SCD/XRT/Chemo 2003, TIA, tachycardia, occipital neuralgia, neuropathy, recently admitted to [place]  rehab on 4/19, was admitted to telemetry from rehab for bloody BMs, while in tele became hypotensive transferred to ICU. Was given 2 units PRBC, 2FFP and 1 plt, at this time.  Admitted to ICU for hypovolemic shock 2/2 GI bleed, requiring IV vasopressor therapy.       Events last 24 hours: Had EGD today w/out any significant findings. Given 2 more PRBC w/ appropriate response in Hg. Repeat Hg tonight 9.3 ( from 9.7). Has had 1 small bloody BM so far this evening.     PAST MEDICAL & SURGICAL HISTORY:  Interstitial lung disease: on home o2 prn  NHL (non-Hodgkin's lymphoma): Agem 43 sp chemo/rt/stem cell  Transient cerebral ischemia, unspecified type  Mitral prolapse  History of tonsillectomy  History of appendectomy      Review of Systems:  CONSTITUTIONAL: No fever, chills, or fatigue  EYES: No eye pain, visual disturbances, or discharge  ENMT:  No difficulty hearing, tinnitus, vertigo; No sinus or throat pain  NECK: No pain or stiffness  RESPIRATORY: No cough, wheezing, chills or hemoptysis; No shortness of breath  CARDIOVASCULAR: No chest pain, palpitations, dizziness, or leg swelling  GASTROINTESTINAL: No abdominal or epigastric pain. No nausea, vomiting, or hematemesis; No diarrhea or constipation. No melena or hematochezia.  GENITOURINARY: No dysuria, frequency, hematuria, or incontinence  NEUROLOGICAL: No headaches, memory loss, loss of strength, numbness, or tremors  SKIN: No itching, burning, rashes, or lesions   MUSCULOSKELETAL: No joint pain or swelling; No muscle, back, or extremity pain  PSYCHIATRIC: No depression, anxiety, mood swings, or difficulty sleeping      Medications:    phenylephrine    Infusion 0.2 MICROgram(s)/kG/Min IV Continuous <Continuous>    ALBUTerol/ipratropium for Nebulization 3 milliLiter(s) Nebulizer every 6 hours PRN  buDESOnide   0.5 milliGRAM(s) Respule 0.5 milliGRAM(s) Inhalation two times a day  tiotropium 18 MICROgram(s) Capsule 1 Capsule(s) Inhalation daily    acetaminophen   Tablet .. 650 milliGRAM(s) Oral every 6 hours PRN  ALPRAZolam 0.5 milliGRAM(s) Oral three times a day  ondansetron Injectable 4 milliGRAM(s) IV Push every 6 hours PRN  valproate sodium IVPB 500 milliGRAM(s) IV Intermittent every 12 hours        pantoprazole  Injectable 40 milliGRAM(s) IV Push every 12 hours        lactated ringers. 1000 milliLiter(s) IV Continuous <Continuous>      lidocaine   Patch 1 Patch Transdermal daily  zinc oxide 20% Ointment 1 Application(s) Topical daily            ICU Vital Signs Last 24 Hrs  T(C): 36.7 (25 Apr 2019 04:15), Max: 37.6 (24 Apr 2019 21:00)  T(F): 98 (25 Apr 2019 04:15), Max: 99.7 (24 Apr 2019 21:00)  HR: 77 (25 Apr 2019 06:00) (61 - 138)  BP: 104/54 (25 Apr 2019 06:00) (56/44 - 134/83)  BP(mean): 69 (25 Apr 2019 06:00) (50 - 96)  ABP: --  ABP(mean): --  RR: 16 (25 Apr 2019 06:00) (16 - 34)  SpO2: 100% (25 Apr 2019 06:00) (76% - 100%)          I&O's Detail    23 Apr 2019 07:01  -  24 Apr 2019 07:00  --------------------------------------------------------  IN:    Lactated Ringers IV Bolus: 250 mL    lactated ringers.: 100 mL    lactated ringers.: 1000 mL    Oral Fluid: 1270 mL    Packed Red Blood Cells: 284 mL    Plasma: 643 mL    Platelets - Single Donor: 219 mL    Solution: 200 mL    Solution: 50 mL  Total IN: 4016 mL    OUT:    Stool: 9 mL  Total OUT: 9 mL    Total NET: 4007 mL      24 Apr 2019 07:01  -  25 Apr 2019 06:13  --------------------------------------------------------  IN:    Lactated Ringers IV Bolus: 1000 mL    lactated ringers.: 1500 mL    Packed Red Blood Cells: 277 mL    phenylephrine   Infusion: 248.2 mL  Total IN: 3025.2 mL    OUT:    Stool: 16 mL  Total OUT: 16 mL    Total NET: 3009.2 mL            LABS:                        9.4    12.4  )-----------( 221      ( 25 Apr 2019 04:40 )             27.5     04-25    141  |  104  |  9   ----------------------------<  96  3.8   |  27  |  0.58    Ca    8.7      25 Apr 2019 04:40  Phos  3.2     04-25  Mg     1.7     04-25    TPro  6.3  /  Alb  2.6<L>  /  TBili  0.2  /  DBili  x   /  AST  21  /  ALT  19  /  AlkPhos  74  04-23          CAPILLARY BLOOD GLUCOSE        PT/INR - ( 23 Apr 2019 15:20 )   PT: 10.6 sec;   INR: 0.95 ratio         PTT - ( 23 Apr 2019 15:20 )  PTT:31.2 sec    CULTURES:      Physical Examination:    General:  Alert/ Awake. No acute distress     HEENT: Pupils equal, reactive to light.  Symmetric.    PULM: Coarse breath sounds b/l., no significant sputum production. No wheeze/ rhonchi     CVS: Regular rate and rhythm, no murmurs, rubs, or gallops    ABD: Soft, nondistended, nontender, normoactive bowel sounds, no masses    EXT: No edema, nontender    SKIN: Warm and well perfused, no rashes noted.    NEURO: A&Ox3, strength 5/5 all extremities, cranial nerves grossly intact, no focal deficits    RADIOLOGY: < from: CT Abdomen and Pelvis No Cont (04.24.19 @ 10:39) >    EXAM:  CT ABDOMEN AND PELVIS      PROCEDURE DATE:  04/24/2019        INTERPRETATION:  CLINICAL INFORMATION: Abdominal pain    COMPARISON: None.    PROCEDURE:   CT of the Abdomen and Pelvis was performed without intravenous contrast.   Intravenous contrast: None.  Oral contrast: None.  Sagittal and coronal reformats were performed.    FINDINGS:    LOWER CHEST: Tiny right pneumothorax. Basilar lung opacities. A few   scattered nodules measuring up to 6 mm.    Please note that evaluation of the abdominal organs and vascular   structures is limited by lack of intravenous contrast.    LIVER: Several indeterminate hepatic lesions measuring up to 2.5 cm.   Additional hypodense hepatic lesions measuring up to 2.7 cm may represent   cysts.  BILE DUCTS: Normal caliber.  GALLBLADDER: Within normal limits.  SPLEEN: Within normal limits.  PANCREAS: Within normal limits.  ADRENALS: Mild thickening.  KIDNEYS/URETERS: Within normal limits.    BLADDER: Within normal limits.  REPRODUCTIVE ORGANS: Calcified uterine fibroid.    BOWEL: No bowel obstruction. Appendix not visualized. Rectum markedly   distended by stool.  PERITONEUM: No ascites.  VESSELS:  Atherosclerotic calcifications.  RETROPERITONEUM: No lymphadenopathy.    ABDOMINAL WALL: Small fat-containing umbilical hernia.  BONES: Spinal degenerative changes. Indeterminate regions of sclerosis   within the right iliac bone.    IMPRESSION:     Tiny right pneumothorax.  Several indeterminate hepatic lesions measuring up to 2.5 cm. Contrast   enhanced study recommended for further assessment and characterization.  Marked rectal distention by stool.    Findings were discussed with RAYSA Vazquez by telephone on 4/24/2019 at 1054   hours.      < end of copied text >      CRITICAL CARE TIME SPENT:  38 min  Evaluating/treating patient, reviewing data/labs/imaging, discussing case with multidisciplinary team, discussing plan/goals of care with patient/family. Non-inclusive of procedure time.

## 2019-04-24 NOTE — PROGRESS NOTE ADULT - ASSESSMENT
58 y/o female with multiple medical issues who was transferred from rehab this morning s/p rapid response due to onset of lower GI Bleed. H/H 10.4/30.8 She is S/P 2 units PRBCs, 2 Units Plasma, 1 unit Plts. Abdomen soft, non-tender. Hold colonoscopy for now as patient unable to complete prep and continues to have large clots and bloody bowel movements. ? Diverticular bleed

## 2019-04-24 NOTE — PROGRESS NOTE ADULT - ASSESSMENT
60 y/o female pmhx of MVP, chronic subdural hematoma, interstitial lung disease pneumothorax seizures, pulmonary nodules, Meniere's disease, non hodgkin's lymphoma s/p SCD/XRT/Chemo 2003, TIA, tachycardia, occipital neuralgia, neuropathy, recently admitted to [facility]  rehab. Now admitted to ICU w/ hypovolemic shock 2/2 GI bleed, acute blood loss anemia.

## 2019-04-24 NOTE — PROGRESS NOTE ADULT - SUBJECTIVE AND OBJECTIVE BOX
SUBJ:  Patient is a 59y old  Female who presents with a chief complaint of Rectal bleed (24 Apr 2019 09:01)  chart reviewed and patient examined  case discussed with Dr. Veras   patient in bed, feeling weak... continues to have bloody BMs   daughter at bedside       PAST MEDICAL & SURGICAL HISTORY:  Interstitial lung disease: on home o2 prn  NHL (non-Hodgkin's lymphoma): Agem 43 sp chemo/rt/stem cell  Transient cerebral ischemia, unspecified type  Mitral prolapse  History of tonsillectomy  History of appendectomy      MEDICATIONS  (STANDING):  ALPRAZolam 0.5 milliGRAM(s) Oral three times a day  buDESOnide   0.5 milliGRAM(s) Respule 0.5 milliGRAM(s) Inhalation two times a day  lactated ringers. 1000 milliLiter(s) (100 mL/Hr) IV Continuous <Continuous>  lidocaine   Patch 1 Patch Transdermal daily  pantoprazole  Injectable 40 milliGRAM(s) IV Push every 12 hours  tiotropium 18 MICROgram(s) Capsule 1 Capsule(s) Inhalation daily  valproate sodium IVPB 500 milliGRAM(s) IV Intermittent every 12 hours  zinc oxide 20% Ointment 1 Application(s) Topical daily    MEDICATIONS  (PRN):  acetaminophen   Tablet .. 650 milliGRAM(s) Oral every 6 hours PRN Temp greater or equal to 38C (100.4F), Mild Pain (1 - 3)  ALBUTerol/ipratropium for Nebulization 3 milliLiter(s) Nebulizer every 6 hours PRN Shortness of Breath and/or Wheezing  ondansetron Injectable 4 milliGRAM(s) IV Push every 6 hours PRN Nausea and/or Vomiting          Vital Signs Last 24 Hrs  T(C): 36.8 (24 Apr 2019 08:00), Max: 36.9 (23 Apr 2019 21:00)  T(F): 98.3 (24 Apr 2019 08:00), Max: 98.5 (23 Apr 2019 21:00)  HR: 110 (24 Apr 2019 08:00) (82 - 118)  BP: 124/78 (24 Apr 2019 08:00) (61/49 - 124/78)  BP(mean): 92 (24 Apr 2019 08:00) (54 - 96)  RR: 20 (24 Apr 2019 08:00) (14 - 27)  SpO2: 97% (24 Apr 2019 08:00) (81% - 100%)    REVIEW OF SYSTEMS:  CONSTITUTIONAL: feels weak   RESPIRATORY: No cough, wheezing, chills or hemoptysis; No shortness of breath  CARDIOVASCULAR: No chest pain or chest pressure.  No shortness of breath or dyspnea on exertion.  No palpitations, dizziness, light headedness, syncope or near syncope.  No edema, no orthopnea.   NEUROLOGICAL: No headaches, memory loss, loss of strength, numbness, or tremors      PHYSICAL EXAM  Constitutional:  frail older appearing woman   HEENT: normocephalic, atraumatic.  PERRLA. EOMI  Neck : No JVD. no carotid bruits  Lungs:  decreased breath sounds bilaterally   Heart:  S1 and S2. No S3, S4. II/VI systolic murmur.  Abdomen:  soft, non tender.  Extremities: No clubbing, cyanoisis or edema  Nuerologic:  A+O x 3. No focal deficits  Skin:  no rashes        LABS:                        10.4   7.2   )-----------( 303      ( 24 Apr 2019 05:00 )             30.8     04-24    142  |  105  |  11  ----------------------------<  91  3.6   |  28  |  0.64    Ca    9.1      24 Apr 2019 05:00  Phos  3.4     04-24  Mg     2.4     04-24    TPro  6.3  /  Alb  2.6<L>  /  TBili  0.2  /  DBili  x   /  AST  21  /  ALT  19  /  AlkPhos  74  04-23            acetaminophen   Tablet .. 650 milliGRAM(s) Oral every 6 hours PRN  ALBUTerol/ipratropium for Nebulization 3 milliLiter(s) Nebulizer every 6 hours PRN  ALPRAZolam 0.5 milliGRAM(s) Oral three times a day  buDESOnide   0.5 milliGRAM(s) Respule 0.5 milliGRAM(s) Inhalation two times a day  lactated ringers. 1000 milliLiter(s) IV Continuous <Continuous>  lidocaine   Patch 1 Patch Transdermal daily  ondansetron Injectable 4 milliGRAM(s) IV Push every 6 hours PRN  pantoprazole  Injectable 40 milliGRAM(s) IV Push every 12 hours  tiotropium 18 MICROgram(s) Capsule 1 Capsule(s) Inhalation daily  valproate sodium IVPB 500 milliGRAM(s) IV Intermittent every 12 hours  zinc oxide 20% Ointment 1 Application(s) Topical daily    I&O's Summary    23 Apr 2019 07:01  -  24 Apr 2019 07:00  --------------------------------------------------------  IN: 4016 mL / OUT: 9 mL / NET: 4007 mL    24 Apr 2019 07:01  -  24 Apr 2019 09:55  --------------------------------------------------------  IN: 100 mL / OUT: 3 mL / NET: 97 mL    < from: 12 Lead ECG (04.23.19 @ 05:10) >  Sinus tachycardia  Nonspecific ST and T wave abnormality  Baseline wander  Artifact is present.   Abnormal ECG  When compared with ECG of 22-APR-2019 13:23,  Non-specific change in ST segment in Anterior leads    < end of copied text >    < from: TTE Echo Doppler w/o Cont (08.21.18 @ 14:19) >  1. There is normal left ventricular size and left ventricular systolic   function. The left ventricular ejection fraction is approximate 60%.  2. There are no gross wall motion abnormalities.  3. There is normal left ventricular wall thickness.  4. There is mild left atrial enlargement.  5. There is diastolic dysfunction.  6. There is classic mitralvalve prolapse with moderate to severe mitral   regurgitation.  7. There is mild to moderate tricuspid regurgitation with a right   ventricular systolic pressure of 39 mmHg consistent with borderline   pulmonary hypertension.  8. There is a trileaflet aortic valve with minimal atherosclerotic change   and normal leaflet excursion without significant aortic valve stenosis or   regurgitation.  9. There is trivial pulmonic insufficiency.  10. There is normal right atrial and right ventricular size and systolic   function.  11. There is a trivial to small pericardial effusion without any evidence   for tamponade.    < end of copied text >

## 2019-04-24 NOTE — CONSULT NOTE ADULT - SUBJECTIVE AND OBJECTIVE BOX
This 59 year old man underwent a partial colectomy over forty years ago reportedly for Crohn's disease. He was feeling well until the past few months when began experiencing episodes of epigastric discomfort and "severe heartburn". The symptoms severe enough that he presented to the ED last night. The patient became nauseated and vomited just before arriving at the hospital. He reported passage of flatus and a small solid stool yesterday. A CT scan revealed a probable SBO with dilatation of the stomach and proximal SB.      PAST MEDICAL & SURGICAL HISTORY:  Right colectomy - 1973    PFSH: All noncontributory    MEDICATIONS REVIEWED:acetaminophen   Tablet .. 650 milliGRAM(s) Oral every 6 hours PRN  ALBUTerol/ipratropium for Nebulization 3 milliLiter(s) Nebulizer every 6 hours PRN  ALPRAZolam 0.5 milliGRAM(s) Oral three times a day  buDESOnide   0.5 milliGRAM(s) Respule 0.5 milliGRAM(s) Inhalation two times a day  lactated ringers. 1000 milliLiter(s) IV Continuous <Continuous>  lidocaine   Patch 1 Patch Transdermal daily  ondansetron Injectable 4 milliGRAM(s) IV Push every 6 hours PRN  pantoprazole  Injectable 40 milliGRAM(s) IV Push every 12 hours  phenylephrine    Infusion 0.2 MICROgram(s)/kG/Min IV Continuous <Continuous>  tiotropium 18 MICROgram(s) Capsule 1 Capsule(s) Inhalation daily  valproate sodium IVPB 500 milliGRAM(s) IV Intermittent every 12 hours  zinc oxide 20% Ointment 1 Application(s) Topical daily      ALLERGIES:IV Contrast (Anaphylaxis)  shellfish. (Anaphylaxis)      REVIEW OF SYSTEMS:  Comprehensive Review of Systems negative except as noted in HPI      PHYSICAL EXAMINATION:  RESPIRATORY: Clear to auscultation bilaterally, respirations non-labored.  CARDIAC: RR, normal S1S2, no murmurs.  ABDOMEN: Distended and tense, no localized tenderness, BS present, no masses, no hernias, no peritoneal signs, no KLS  VASCULAR: Equal and normal pulses.  MUSCULOSKELETAL: Full ROM, no deformities.      T(C): 36.9 (04-24-19 @ 15:00), Max: 36.9 (04-23-19 @ 21:00)  HR: 111 (04-24-19 @ 20:00) (82 - 138)  BP: 101/79 (04-24-19 @ 20:00) (56/44 - 134/83)  RR: 24 (04-24-19 @ 20:00) (15 - 34)  SpO2: 83% (04-24-19 @ 20:00) (76% - 100%)                          9.7    10.0  )-----------( 208      ( 24 Apr 2019 18:30 )             27.7       04-24    142  |  105  |  11  ----------------------------<  91  3.6   |  28  |  0.64    Ca    9.1      24 Apr 2019 05:00  Phos  3.4     04-24  Mg     2.4     04-24    TPro  6.3  /  Alb  2.6<L>  /  TBili  0.2  /  DBili  x   /  AST  21  /  ALT  19  /  AlkPhos  74  04-23 This year old woman suffered a seizure secondary to a probable "ischemic episode". She has been recuperating in the Rehab department when she began passing BRBPR. The patient denies any previous similar problems. The patient was quickly transferred to the ICU where she has received FFP and two units of PC. The patient denies any significant abdominal past history.    PFSH: All noncontributory    MEDICATIONS REVIEWED:acetaminophen   Tablet .. 650 milliGRAM(s) Oral every 6 hours PRN  ALBUTerol/ipratropium for Nebulization 3 milliLiter(s) Nebulizer every 6 hours PRN  ALPRAZolam 0.5 milliGRAM(s) Oral three times a day  buDESOnide   0.5 milliGRAM(s) Respule 0.5 milliGRAM(s) Inhalation two times a day  lactated ringers. 1000 milliLiter(s) IV Continuous <Continuous>  lidocaine   Patch 1 Patch Transdermal daily  ondansetron Injectable 4 milliGRAM(s) IV Push every 6 hours PRN  pantoprazole  Injectable 40 milliGRAM(s) IV Push every 12 hours  phenylephrine    Infusion 0.2 MICROgram(s)/kG/Min IV Continuous <Continuous>  tiotropium 18 MICROgram(s) Capsule 1 Capsule(s) Inhalation daily  valproate sodium IVPB 500 milliGRAM(s) IV Intermittent every 12 hours  zinc oxide 20% Ointment 1 Application(s) Topical daily      ALLERGIES:IV Contrast (Anaphylaxis)  shellfish. (Anaphylaxis)      REVIEW OF SYSTEMS:  Comprehensive Review of Systems negative except as noted in HPI      PHYSICAL EXAMINATION:  RESPIRATORY: Clear to auscultation bilaterally, respirations non-labored.  CARDIAC: RR, normal S1S2, no murmurs.  ABDOMEN: soft, BS present, no masses, no hernias, no peritoneal signs, no KLS, nontender.  VASCULAR: Equal and normal pulses.  MUSCULOSKELETAL: Full ROM, no deformities.      T(C): 36.9 (04-24-19 @ 15:00), Max: 36.9 (04-24-19 @ 15:00)  HR: 111 (04-24-19 @ 20:00) (82 - 138)  BP: 101/79 (04-24-19 @ 20:00) (56/44 - 134/83)  RR: 24 (04-24-19 @ 20:00) (15 - 34)  SpO2: 83% (04-24-19 @ 20:00) (76% - 100%)                          9.7    10.0  )-----------( 208      ( 24 Apr 2019 18:30 )             27.7       04-24    142  |  105  |  11  ----------------------------<  91  3.6   |  28  |  0.64    Ca    9.1      24 Apr 2019 05:00  Phos  3.4     04-24  Mg     2.4     04-24    TPro  6.3  /  Alb  2.6<L>  /  TBili  0.2  /  DBili  x   /  AST  21  /  ALT  19  /  AlkPhos  74  04-23            PAST MEDICAL & SURGICAL HISTORY    PFSH: All noncontributory    MEDICATIONS REVIEWED:acetaminophen   Tablet .. 650 milliGRAM(s) Oral every 6 hours PRN  ALBUTerol/ipratropium for Nebulization 3 milliLiter(s) Nebulizer every 6 hours PRN  ALPRAZolam 0.5 milliGRAM(s) Oral three times a day  buDESOnide   0.5 milliGRAM(s) Respule 0.5 milliGRAM(s) Inhalation two times a day  lactated ringers. 1000 milliLiter(s) IV Continuous <Continuous>  lidocaine   Patch 1 Patch Transdermal daily  ondansetron Injectable 4 milliGRAM(s) IV Push every 6 hours PRN  pantoprazole  Injectable 40 milliGRAM(s) IV Push every 12 hours  phenylephrine    Infusion 0.2 MICROgram(s)/kG/Min IV Continuous <Continuous>  tiotropium 18 MICROgram(s) Capsule 1 Capsule(s) Inhalation daily  valproate sodium IVPB 500 milliGRAM(s) IV Intermittent every 12 hours  zinc oxide 20% Ointment 1 Application(s) Topical daily      ALLERGIES:IV Contrast (Anaphylaxis)  shellfish. (Anaphylaxis)      REVIEW OF SYSTEMS:  Comprehensive Review of Systems negative except as noted in HPI      PHYSICAL EXAMINATION:  RESPIRATORY: Clear to auscultation bilaterally, respirations non-labored.  CARDIAC: RR, normal S1S2, no murmurs.  ABDOMEN: Distended and tense, no localized tenderness, BS present, no masses, no hernias, no peritoneal signs, no KLS  VASCULAR: Equal and normal pulses.  MUSCULOSKELETAL: Full ROM, no deformities.      T(C): 36.9 (04-24-19 @ 15:00), Max: 36.9 (04-23-19 @ 21:00)  HR: 111 (04-24-19 @ 20:00) (82 - 138)  BP: 101/79 (04-24-19 @ 20:00) (56/44 - 134/83)  RR: 24 (04-24-19 @ 20:00) (15 - 34)  SpO2: 83% (04-24-19 @ 20:00) (76% - 100%)                          9.7    10.0  )-----------( 208      ( 24 Apr 2019 18:30 )             27.7       04-24    142  |  105  |  11  ----------------------------<  91  3.6   |  28  |  0.64    Ca    9.1      24 Apr 2019 05:00  Phos  3.4     04-24  Mg     2.4     04-24    TPro  6.3  /  Alb  2.6<L>  /  TBili  0.2  /  DBili  x   /  AST  21  /  ALT  19  /  AlkPhos  74  04-23

## 2019-04-24 NOTE — CHART NOTE - NSCHARTNOTEFT_GEN_A_CORE
Patient with hypotension in the setting of GI bleed. Attempting to trend CBC however there is difficulty with venipuncture. Patient offered central line for improved venous access and to aid in resuscitation. Patient declines. Patient also offered CTA abd/pelvis to evaluate GI bleed, also recommended by GI which patient declines due to concern with IV contrast.     XIN Alvarado and daughter present at beside during conversation.

## 2019-04-24 NOTE — PROGRESS NOTE ADULT - SUBJECTIVE AND OBJECTIVE BOX
Follow-up Critical Care Progress Note  Chief Complaint : Other ulcerative colitis with rectal bleeding    Still with bloody bowel movements. No abdominal pain, nausea or vomiting reported.    Allergies :IV Contrast (Anaphylaxis)  shellfish. (Anaphylaxis)    PAST MEDICAL & SURGICAL HISTORY:  Interstitial lung disease: on home o2 prn  NHL (non-Hodgkin's lymphoma): Agem 43 sp chemo/rt/stem cell  Transient cerebral ischemia, unspecified type  Mitral prolapse  History of tonsillectomy  History of appendectomy    Medications:  MEDICATIONS  (STANDING):  ALPRAZolam 0.5 milliGRAM(s) Oral three times a day  buDESOnide   0.5 milliGRAM(s) Respule 0.5 milliGRAM(s) Inhalation two times a day  lactated ringers. 1000 milliLiter(s) (100 mL/Hr) IV Continuous <Continuous>  lidocaine   Patch 1 Patch Transdermal daily  pantoprazole  Injectable 40 milliGRAM(s) IV Push every 12 hours  tiotropium 18 MICROgram(s) Capsule 1 Capsule(s) Inhalation daily  valproate sodium IVPB 500 milliGRAM(s) IV Intermittent every 12 hours  zinc oxide 20% Ointment 1 Application(s) Topical daily    MEDICATIONS  (PRN):  acetaminophen   Tablet .. 650 milliGRAM(s) Oral every 6 hours PRN Temp greater or equal to 38C (100.4F), Mild Pain (1 - 3)  ALBUTerol/ipratropium for Nebulization 3 milliLiter(s) Nebulizer every 6 hours PRN Shortness of Breath and/or Wheezing  ondansetron Injectable 4 milliGRAM(s) IV Push every 6 hours PRN Nausea and/or Vomiting    LABS:                      10.4   7.2   )-----------( 303      ( 24 Apr 2019 05:00 )             30.8     04-24  142  |  105  |  11  ----------------------------<  91  3.6   |  28  |  0.64    Ca    9.1      24 Apr 2019 05:00  Phos  3.4     04-24  Mg     2.4     04-24    TPro  6.3  /  Alb  2.6<L>  /  TBili  0.2  /  DBili  x   /  AST  21  /  ALT  19  /  AlkPhos  74  04-23    PT/INR - ( 23 Apr 2019 15:20 )   PT: 10.6 sec;   INR: 0.95 ratio       PTT - ( 23 Apr 2019 15:20 )  PTT:31.2 sec    VITALS:  T(C): 36.8 (04-24-19 @ 08:00), Max: 36.9 (04-23-19 @ 21:00)  T(F): 98.3 (04-24-19 @ 08:00), Max: 98.5 (04-23-19 @ 21:00)  HR: 110 (04-24-19 @ 08:00) (82 - 118)  BP: 124/78 (04-24-19 @ 08:00) (61/49 - 124/78)  BP(mean): 92 (04-24-19 @ 08:00) (54 - 96)  ABP: --  ABP(mean): --  RR: 20 (04-24-19 @ 08:00) (14 - 27)  SpO2: 97% (04-24-19 @ 08:00) (81% - 100%)  CVP(mm Hg): --  CVP(cm H2O): --    Ins and Outs     04-23-19 @ 07:01  -  04-24-19 @ 07:00  --------------------------------------------------------  IN: 4016 mL / OUT: 9 mL / NET: 4007 mL    04-24-19 @ 07:01  -  04-24-19 @ 09:01  --------------------------------------------------------  IN: 100 mL / OUT: 1 mL / NET: 99 mL        Height (cm): 175.26 (04-23-19 @ 05:55)  Weight (kg): 57.8 (04-23-19 @ 05:55)  BMI (kg/m2): 18.8 (04-23-19 @ 05:55)    Physical exam:  GENERAL:               Alert, Oriented, No acute distress.    HEENT:                    Pupils equal, reactive to light.  Symmetric. No JVD, Moist MM  PULM:                     Bilateral air entry, Clear to auscultation bilaterally, No Rales, No Rhonchi, No Wheezing  CVS:                         S1, S2,  + Murmur  ABD:                        Soft, nondistended, nontender, normoactive bowel sounds,   EXT:                        Soft, No edema, nontender, No Cyanosis or Clubbing   Vascular:                Warm Extremities, Normal Capillary refill, Normal Distal Pulses  SKIN:                       Warm and well perfused, no rashes noted. Pale with poor capillary refill  NEURO:                  Alert, oriented, interactive, nonfocal, follows commands. Weak left upper extremity strength 1/5. Other three extremities 4/5   PSYC:                      Calm, + Insight.

## 2019-04-24 NOTE — CONSULT NOTE ADULT - ATTENDING COMMENTS
IMPRESSION: SBO - probably adhesive IMPRESSION: Lower GI hemorrhage- probably diverticular    PLAN: Monitor H/H           No indication of any surgical problem at this point           If significant bleeding were to continue, transfer for IR intervention would be indicated

## 2019-04-24 NOTE — PROGRESS NOTE ADULT - ASSESSMENT
59 yr old female PMHx of MVP, chronic SDH, interstitial lung disease/pneumothorax, pulmonary nodules, meniere's, non hodgkins lymphoma (SCD/XRT/chemo at [facility] 2003), TIA, tachycardia, occipital neuralgia, and neuropathy being admitted to the ICU for acute GI bleeding with hypotension. s/p 2 units PRBC, 2 units FFP and 1 unit platelet transfustion. BP improved with resuscitation.     Assessment  1. Hypovolemic shock - IV hydration. Maintain blood pressure with MAP >65. Hold BP meds for now.   2. Acute blood loss anemia - Monitor hgb q 6 hours. Will transfuse if becomes hypotensive  3. Gastrointestinal hemorrhage - GI follow up requested. Recommending CT angiogram, but patient with contrast allergy (anaphylaxis). Patient refusing contrast administration even if premedicated. Will obtain CT abdomen/pelvis non contrast for now. PPI IV BID. NPO for now. Surgery consult pending.  4. CVA - Left upper extremity spasm/paresis. Holding aspirin for now given acute bleeding with shock   5. Seizures - Cont. IV meds  6. Intersitial lung disease - Pulmonary follow up. Cont. Inhalers for now. Currently does not need oxygen support.     SCD for DVT prophylaxis.   35 min of critical care time spent on this patient's care

## 2019-04-24 NOTE — PROGRESS NOTE ADULT - ASSESSMENT
59 year old woman in ICU because of ongoing lower GI bleed.  s/p blood products (PRBCs, platelets, FFP) transfusion yesterday.  Hemodynamically stable.  Medical problems include interstitial lung disease, non Hodgkin's lymphoma, TIA/ CVA, subdural hematoma.  She has a know history of MVP with moderate- severe MR.  Current sinus tachycardia is due to ongoing acute GI bleed   ASA on hold due to acute bleeding     Plan  - continue current supportive care   - GI workup in progress... ? colonoscopy  - continue IVF and transfuse as needed    discussed with patient, her daughter and ICU team

## 2019-04-25 DIAGNOSIS — R57.1 HYPOVOLEMIC SHOCK: ICD-10-CM

## 2019-04-25 DIAGNOSIS — G45.9 TRANSIENT CEREBRAL ISCHEMIC ATTACK, UNSPECIFIED: ICD-10-CM

## 2019-04-25 DIAGNOSIS — D50.0 IRON DEFICIENCY ANEMIA SECONDARY TO BLOOD LOSS (CHRONIC): ICD-10-CM

## 2019-04-25 DIAGNOSIS — K92.2 GASTROINTESTINAL HEMORRHAGE, UNSPECIFIED: ICD-10-CM

## 2019-04-25 LAB
ANION GAP SERPL CALC-SCNC: 10 MMOL/L — SIGNIFICANT CHANGE UP (ref 5–17)
ANION GAP SERPL CALC-SCNC: 8 MMOL/L — SIGNIFICANT CHANGE UP (ref 5–17)
BUN SERPL-MCNC: 5 MG/DL — LOW (ref 7–23)
BUN SERPL-MCNC: 9 MG/DL — SIGNIFICANT CHANGE UP (ref 7–23)
CALCIUM SERPL-MCNC: 8.7 MG/DL — SIGNIFICANT CHANGE UP (ref 8.4–10.5)
CALCIUM SERPL-MCNC: 8.8 MG/DL — SIGNIFICANT CHANGE UP (ref 8.4–10.5)
CHLORIDE SERPL-SCNC: 103 MMOL/L — SIGNIFICANT CHANGE UP (ref 96–108)
CHLORIDE SERPL-SCNC: 104 MMOL/L — SIGNIFICANT CHANGE UP (ref 96–108)
CO2 SERPL-SCNC: 27 MMOL/L — SIGNIFICANT CHANGE UP (ref 22–31)
CO2 SERPL-SCNC: 27 MMOL/L — SIGNIFICANT CHANGE UP (ref 22–31)
CREAT SERPL-MCNC: 0.58 MG/DL — SIGNIFICANT CHANGE UP (ref 0.5–1.3)
CREAT SERPL-MCNC: 0.63 MG/DL — SIGNIFICANT CHANGE UP (ref 0.5–1.3)
GLUCOSE SERPL-MCNC: 105 MG/DL — HIGH (ref 70–99)
GLUCOSE SERPL-MCNC: 96 MG/DL — SIGNIFICANT CHANGE UP (ref 70–99)
HCT VFR BLD CALC: 24.1 % — LOW (ref 34.5–45)
HCT VFR BLD CALC: 26 % — LOW (ref 34.5–45)
HCT VFR BLD CALC: 27.5 % — LOW (ref 34.5–45)
HGB BLD-MCNC: 8.2 G/DL — LOW (ref 11.5–15.5)
HGB BLD-MCNC: 8.9 G/DL — LOW (ref 11.5–15.5)
HGB BLD-MCNC: 9.4 G/DL — LOW (ref 11.5–15.5)
MAGNESIUM SERPL-MCNC: 1.7 MG/DL — SIGNIFICANT CHANGE UP (ref 1.6–2.6)
MAGNESIUM SERPL-MCNC: 1.7 MG/DL — SIGNIFICANT CHANGE UP (ref 1.6–2.6)
MCHC RBC-ENTMCNC: 30.7 PG — SIGNIFICANT CHANGE UP (ref 27–34)
MCHC RBC-ENTMCNC: 31 PG — SIGNIFICANT CHANGE UP (ref 27–34)
MCHC RBC-ENTMCNC: 31.4 PG — SIGNIFICANT CHANGE UP (ref 27–34)
MCHC RBC-ENTMCNC: 34 GM/DL — SIGNIFICANT CHANGE UP (ref 32–36)
MCHC RBC-ENTMCNC: 34.2 GM/DL — SIGNIFICANT CHANGE UP (ref 32–36)
MCHC RBC-ENTMCNC: 34.4 GM/DL — SIGNIFICANT CHANGE UP (ref 32–36)
MCV RBC AUTO: 89.2 FL — SIGNIFICANT CHANGE UP (ref 80–100)
MCV RBC AUTO: 90.5 FL — SIGNIFICANT CHANGE UP (ref 80–100)
MCV RBC AUTO: 92.1 FL — SIGNIFICANT CHANGE UP (ref 80–100)
PHOSPHATE SERPL-MCNC: 3.2 MG/DL — SIGNIFICANT CHANGE UP (ref 2.5–4.5)
PHOSPHATE SERPL-MCNC: 3.5 MG/DL — SIGNIFICANT CHANGE UP (ref 2.5–4.5)
PLATELET # BLD AUTO: 199 K/UL — SIGNIFICANT CHANGE UP (ref 150–400)
PLATELET # BLD AUTO: 221 K/UL — SIGNIFICANT CHANGE UP (ref 150–400)
PLATELET # BLD AUTO: 234 K/UL — SIGNIFICANT CHANGE UP (ref 150–400)
POTASSIUM SERPL-MCNC: 3.8 MMOL/L — SIGNIFICANT CHANGE UP (ref 3.5–5.3)
POTASSIUM SERPL-MCNC: 4.1 MMOL/L — SIGNIFICANT CHANGE UP (ref 3.5–5.3)
POTASSIUM SERPL-SCNC: 3.8 MMOL/L — SIGNIFICANT CHANGE UP (ref 3.5–5.3)
POTASSIUM SERPL-SCNC: 4.1 MMOL/L — SIGNIFICANT CHANGE UP (ref 3.5–5.3)
RBC # BLD: 2.62 M/UL — LOW (ref 3.8–5.2)
RBC # BLD: 2.87 M/UL — LOW (ref 3.8–5.2)
RBC # BLD: 3.08 M/UL — LOW (ref 3.8–5.2)
RBC # FLD: 13.5 % — SIGNIFICANT CHANGE UP (ref 10.3–14.5)
RBC # FLD: 14.4 % — SIGNIFICANT CHANGE UP (ref 10.3–14.5)
RBC # FLD: 14.6 % — HIGH (ref 10.3–14.5)
SODIUM SERPL-SCNC: 138 MMOL/L — SIGNIFICANT CHANGE UP (ref 135–145)
SODIUM SERPL-SCNC: 141 MMOL/L — SIGNIFICANT CHANGE UP (ref 135–145)
WBC # BLD: 10.2 K/UL — SIGNIFICANT CHANGE UP (ref 3.8–10.5)
WBC # BLD: 12 K/UL — HIGH (ref 3.8–10.5)
WBC # BLD: 12.4 K/UL — HIGH (ref 3.8–10.5)
WBC # FLD AUTO: 10.2 K/UL — SIGNIFICANT CHANGE UP (ref 3.8–10.5)
WBC # FLD AUTO: 12 K/UL — HIGH (ref 3.8–10.5)
WBC # FLD AUTO: 12.4 K/UL — HIGH (ref 3.8–10.5)

## 2019-04-25 PROCEDURE — 71045 X-RAY EXAM CHEST 1 VIEW: CPT | Mod: 26

## 2019-04-25 PROCEDURE — 99232 SBSQ HOSP IP/OBS MODERATE 35: CPT

## 2019-04-25 PROCEDURE — 99233 SBSQ HOSP IP/OBS HIGH 50: CPT

## 2019-04-25 RX ORDER — MAGNESIUM SULFATE 500 MG/ML
1 VIAL (ML) INJECTION
Qty: 0 | Refills: 0 | Status: COMPLETED | OUTPATIENT
Start: 2019-04-25 | End: 2019-04-25

## 2019-04-25 RX ORDER — ALPRAZOLAM 0.25 MG
0.5 TABLET ORAL THREE TIMES A DAY
Qty: 0 | Refills: 0 | Status: DISCONTINUED | OUTPATIENT
Start: 2019-04-25 | End: 2019-04-25

## 2019-04-25 RX ORDER — SODIUM CHLORIDE 9 MG/ML
1000 INJECTION, SOLUTION INTRAVENOUS
Qty: 0 | Refills: 0 | Status: DISCONTINUED | OUTPATIENT
Start: 2019-04-25 | End: 2019-04-26

## 2019-04-25 RX ADMIN — SODIUM CHLORIDE 75 MILLILITER(S): 9 INJECTION, SOLUTION INTRAVENOUS at 09:36

## 2019-04-25 RX ADMIN — PANTOPRAZOLE SODIUM 40 MILLIGRAM(S): 20 TABLET, DELAYED RELEASE ORAL at 18:13

## 2019-04-25 RX ADMIN — Medication 27.5 MILLIGRAM(S): at 18:13

## 2019-04-25 RX ADMIN — Medication 27.5 MILLIGRAM(S): at 05:03

## 2019-04-25 RX ADMIN — Medication 0.5 MILLIGRAM(S): at 03:34

## 2019-04-25 RX ADMIN — Medication 50 GRAM(S): at 21:28

## 2019-04-25 RX ADMIN — SODIUM CHLORIDE 100 MILLILITER(S): 9 INJECTION, SOLUTION INTRAVENOUS at 04:38

## 2019-04-25 RX ADMIN — Medication 0.5 MILLIGRAM(S): at 08:52

## 2019-04-25 RX ADMIN — TIOTROPIUM BROMIDE 1 CAPSULE(S): 18 CAPSULE ORAL; RESPIRATORY (INHALATION) at 08:54

## 2019-04-25 RX ADMIN — PANTOPRAZOLE SODIUM 40 MILLIGRAM(S): 20 TABLET, DELAYED RELEASE ORAL at 05:03

## 2019-04-25 RX ADMIN — ZINC OXIDE 1 APPLICATION(S): 200 OINTMENT TOPICAL at 11:19

## 2019-04-25 RX ADMIN — Medication 3 MILLILITER(S): at 04:33

## 2019-04-25 RX ADMIN — Medication 650 MILLIGRAM(S): at 04:17

## 2019-04-25 RX ADMIN — Medication 650 MILLIGRAM(S): at 16:04

## 2019-04-25 RX ADMIN — Medication 650 MILLIGRAM(S): at 16:51

## 2019-04-25 RX ADMIN — Medication 0.5 MILLIGRAM(S): at 21:03

## 2019-04-25 RX ADMIN — Medication 650 MILLIGRAM(S): at 03:10

## 2019-04-25 RX ADMIN — Medication 50 GRAM(S): at 22:31

## 2019-04-25 NOTE — PROGRESS NOTE ADULT - PROBLEM SELECTOR PLAN 3
check H/H q6hr, last 24 hrs have been stable  Transfuse for Hg <7 or active bleeding  Have 2 units on Hold
check H/H q6hr, last 2 have been stable  Transfuse for Hg <7 or active bleeding  Have 2 units on Hold

## 2019-04-25 NOTE — PROGRESS NOTE ADULT - SUBJECTIVE AND OBJECTIVE BOX
INTERVAL HPI/OVERNIGHT EVENTS:  HPI:  60 y/o female PMHx of MVP, chronic SDH, interstitial lung disease/pneumothorax, pulmonary nodules, meniere's, non hodgkins lymphoma (SCD/XRT/chemo at [facility] ), TIA, tachycardia, occipital neuralgia, and neuropathy. GI following patient due to persistent GI bleed. Patient seen and examined at bedside in CCU. Per staff bleeding has subsided. She has not had any episodes over night. S/P bedside EGD yesterday with no source of upper GI bleed identified. Denies nausea, vomiting, abdominal pain.        MEDICATIONS  (STANDING):  ALPRAZolam 0.5 milliGRAM(s) Oral three times a day  buDESOnide   0.5 milliGRAM(s) Respule 0.5 milliGRAM(s) Inhalation two times a day  dextrose 5% + sodium chloride 0.45%. 1000 milliLiter(s) (75 mL/Hr) IV Continuous <Continuous>  lidocaine   Patch 1 Patch Transdermal daily  pantoprazole  Injectable 40 milliGRAM(s) IV Push every 12 hours  phenylephrine    Infusion 0.2 MICROgram(s)/kG/Min (2.167 mL/Hr) IV Continuous <Continuous>  tiotropium 18 MICROgram(s) Capsule 1 Capsule(s) Inhalation daily  valproate sodium IVPB 500 milliGRAM(s) IV Intermittent every 12 hours  zinc oxide 20% Ointment 1 Application(s) Topical daily    MEDICATIONS  (PRN):  acetaminophen   Tablet .. 650 milliGRAM(s) Oral every 6 hours PRN Temp greater or equal to 38C (100.4F), Mild Pain (1 - 3)  ALBUTerol/ipratropium for Nebulization 3 milliLiter(s) Nebulizer every 6 hours PRN Shortness of Breath and/or Wheezing  LORazepam   Injectable 1 milliGRAM(s) IV Push four times a day PRN Benzo Withdrawal  ondansetron Injectable 4 milliGRAM(s) IV Push every 6 hours PRN Nausea and/or Vomiting      Allergies    IV Contrast (Anaphylaxis)  shellfish. (Anaphylaxis)    Intolerances        PHYSICAL EXAM:   Vital Signs:  Vital Signs Last 24 Hrs  T(C): 36.5 (2019 08:00), Max: 37.6 (2019 21:00)  T(F): 97.7 (2019 08:00), Max: 99.7 (2019 21:00)  HR: 100 (2019 10:00) (61 - 138)  BP: 92/54 (2019 10:00) (56/44 - 134/83)  BP(mean): 64 (2019 10:00) (50 - 96)  RR: 23 (2019 10:00) (16 - 34)  SpO2: 100% (2019 10:00) (76% - 100%)  Daily     Daily Weight in k (2019 06:00)I&O's Summary    2019 07:01  -  2019 07:00  --------------------------------------------------------  IN: 3025.2 mL / OUT: 16 mL / NET: 3009.2 mL    2019 07:01  -  2019 11:21  --------------------------------------------------------  IN: 200 mL / OUT: 0 mL / NET: 200 mL    GENERAL:  Appears stated age,  HEENT:  NC/AT,  conjunctivae clear and pink  CHEST:  Full & symmetric excursion, no increased effort, breath sounds clear  HEART:  Regular rhythm, S1, S2  ABDOMEN:  Soft, non-tender, non-distended, normoactive bowel sounds  EXTEREMITIES:  no edema, R side weakness  SKIN:  No rash, warm/dry  NEURO:  Alert, oriented, lethargic       LABS:                        9.4    12.4  )-----------( 221      ( 2019 04:40 )             27.5     04-25    141  |  104  |  9   ----------------------------<  96  3.8   |  27  |  0.58    Ca    8.7      2019 04:40  Phos  3.2     04-25  Mg     1.7     04-25    TPro  6.3  /  Alb  2.6<L>  /  TBili  0.2  /  DBili  x   /  AST  21  /  ALT  19  /  AlkPhos  74  04-23    PT/INR - ( 2019 15:20 )   PT: 10.6 sec;   INR: 0.95 ratio         PTT - ( 2019 15:20 )  PTT:31.2 sec INTERVAL HPI/OVERNIGHT EVENTS:  HPI:  60 y/o female PMHx of MVP, chronic SDH, interstitial lung disease/pneumothorax, pulmonary nodules, meniere's, non hodgkins lymphoma (SCD/XRT/chemo at [facility] ), TIA, tachycardia, occipital neuralgia, and neuropathy. GI following patient due to persistent GI bleed. Patient seen and examined at bedside in CCU. Per staff bleeding has subsided. She has not had any episodes over night. S/P bedside EGD yesterday with no source of upper GI bleed identified. Denies nausea, vomiting, abdominal pain.        MEDICATIONS  (STANDING):  ALPRAZolam 0.5 milliGRAM(s) Oral three times a day  buDESOnide   0.5 milliGRAM(s) Respule 0.5 milliGRAM(s) Inhalation two times a day  dextrose 5% + sodium chloride 0.45%. 1000 milliLiter(s) (75 mL/Hr) IV Continuous <Continuous>  lidocaine   Patch 1 Patch Transdermal daily  pantoprazole  Injectable 40 milliGRAM(s) IV Push every 12 hours  phenylephrine    Infusion 0.2 MICROgram(s)/kG/Min (2.167 mL/Hr) IV Continuous <Continuous>  tiotropium 18 MICROgram(s) Capsule 1 Capsule(s) Inhalation daily  valproate sodium IVPB 500 milliGRAM(s) IV Intermittent every 12 hours  zinc oxide 20% Ointment 1 Application(s) Topical daily    MEDICATIONS  (PRN):  acetaminophen   Tablet .. 650 milliGRAM(s) Oral every 6 hours PRN Temp greater or equal to 38C (100.4F), Mild Pain (1 - 3)  ALBUTerol/ipratropium for Nebulization 3 milliLiter(s) Nebulizer every 6 hours PRN Shortness of Breath and/or Wheezing  LORazepam   Injectable 1 milliGRAM(s) IV Push four times a day PRN Benzo Withdrawal  ondansetron Injectable 4 milliGRAM(s) IV Push every 6 hours PRN Nausea and/or Vomiting      Allergies    IV Contrast (Anaphylaxis)  shellfish. (Anaphylaxis)    Intolerances        PHYSICAL EXAM:   Vital Signs:  Vital Signs Last 24 Hrs  T(C): 36.5 (2019 08:00), Max: 37.6 (2019 21:00)  T(F): 97.7 (2019 08:00), Max: 99.7 (2019 21:00)  HR: 100 (2019 10:00) (61 - 138)  BP: 92/54 (2019 10:00) (56/44 - 134/83)  BP(mean): 64 (2019 10:00) (50 - 96)  RR: 23 (2019 10:00) (16 - 34)  SpO2: 100% (2019 10:00) (76% - 100%)  Daily     Daily Weight in k (2019 06:00)I&O's Summary    2019 07:01  -  2019 07:00  --------------------------------------------------------  IN: 3025.2 mL / OUT: 16 mL / NET: 3009.2 mL    2019 07:01  -  2019 11:21  --------------------------------------------------------  IN: 200 mL / OUT: 0 mL / NET: 200 mL    GENERAL:  Appears stated age,  HEENT:  NC/AT,  conjunctivae clear and pink  CHEST:  Full & symmetric excursion, no increased effort, breath sounds clear  HEART:  Regular rhythm, S1, S2  ABDOMEN:  Soft, non-tender, non-distended, normoactive bowel sounds  EXTEREMITIES:  no edema, L side weakness  SKIN:  No rash, warm/dry  NEURO:  Alert, oriented, lethargic       LABS:                        9.4    12.4  )-----------( 221      ( 2019 04:40 )             27.5     04-25    141  |  104  |  9   ----------------------------<  96  3.8   |  27  |  0.58    Ca    8.7      2019 04:40  Phos  3.2     04-25  Mg     1.7     04-25    TPro  6.3  /  Alb  2.6<L>  /  TBili  0.2  /  DBili  x   /  AST  21  /  ALT  19  /  AlkPhos  74  04-23    PT/INR - ( 2019 15:20 )   PT: 10.6 sec;   INR: 0.95 ratio         PTT - ( 2019 15:20 )  PTT:31.2 sec

## 2019-04-25 NOTE — PROGRESS NOTE ADULT - SUBJECTIVE AND OBJECTIVE BOX
The patient is comfortable but very weak today. There has not been any further bleeding for over 24 hrs. The H/H is stable and the pressors are being weaned. The urine output is adequate.      PAST MEDICAL & SURGICAL HISTORY:  No pertinent past medical history.  No pertinent surgical procedures.    PFSH: All noncontributory    MEDICATIONS REVIEWED:acetaminophen   Tablet .. 650 milliGRAM(s) Oral every 6 hours PRN  ALBUTerol/ipratropium for Nebulization 3 milliLiter(s) Nebulizer every 6 hours PRN  ALPRAZolam 0.5 milliGRAM(s) Oral three times a day  buDESOnide   0.5 milliGRAM(s) Respule 0.5 milliGRAM(s) Inhalation two times a day  dextrose 5% + sodium chloride 0.45%. 1000 milliLiter(s) IV Continuous <Continuous>  lidocaine   Patch 1 Patch Transdermal daily  LORazepam   Injectable 1 milliGRAM(s) IV Push four times a day PRN  ondansetron Injectable 4 milliGRAM(s) IV Push every 6 hours PRN  pantoprazole  Injectable 40 milliGRAM(s) IV Push every 12 hours  phenylephrine    Infusion 0.2 MICROgram(s)/kG/Min IV Continuous <Continuous>  tiotropium 18 MICROgram(s) Capsule 1 Capsule(s) Inhalation daily  valproate sodium IVPB 500 milliGRAM(s) IV Intermittent every 12 hours  zinc oxide 20% Ointment 1 Application(s) Topical daily      ALLERGIES:IV Contrast (Anaphylaxis)  shellfish. (Anaphylaxis)      REVIEW OF SYSTEMS:  Comprehensive Review of Systems negative except as noted in HPI      PHYSICAL EXAMINATION:  RESPIRATORY: Clear to auscultation bilaterally, respirations non-labored.  CARDIAC: RR, normal S1S2, no murmurs.  ABDOMEN: soft, BS present, no masses, no hernias, no peritoneal signs, no KLS, nontender.  VASCULAR: Equal and normal pulses.  MUSCULOSKELETAL: Full ROM, no deformities.      T(C): 37.4 (04-25-19 @ 15:00), Max: 37.6 (04-24-19 @ 21:00)  HR: 118 (04-25-19 @ 15:00) (61 - 123)  BP: 87/64 (04-25-19 @ 15:00) (74/61 - 134/83)  RR: 20 (04-25-19 @ 15:00) (16 - 34)  SpO2: 96% (04-25-19 @ 15:00) (83% - 100%)                          8.2    10.2  )-----------( 199      ( 25 Apr 2019 12:00 )             24.1       04-25    141  |  104  |  9   ----------------------------<  96  3.8   |  27  |  0.58    Ca    8.7      25 Apr 2019 04:40  Phos  3.2     04-25  Mg     1.7     04-25    TPro  6.3  /  Alb  2.6<L>  /  TBili  0.2  /  DBili  x   /  AST  21  /  ALT  19  /  AlkPhos  74  04-23 The patient is comfortable but very weak today. There has not been any further bleeding for over 24 hrs. The H/H is stable and the pressors are being weaned. The urine output is adequate. The patient has received 4 units PC and 2 FFP.      PAST MEDICAL & SURGICAL HISTORY:  No pertinent past medical history.  No pertinent surgical procedures.    PFSH: All noncontributory    MEDICATIONS REVIEWED:acetaminophen   Tablet .. 650 milliGRAM(s) Oral every 6 hours PRN  ALBUTerol/ipratropium for Nebulization 3 milliLiter(s) Nebulizer every 6 hours PRN  ALPRAZolam 0.5 milliGRAM(s) Oral three times a day  buDESOnide   0.5 milliGRAM(s) Respule 0.5 milliGRAM(s) Inhalation two times a day  dextrose 5% + sodium chloride 0.45%. 1000 milliLiter(s) IV Continuous <Continuous>  lidocaine   Patch 1 Patch Transdermal daily  LORazepam   Injectable 1 milliGRAM(s) IV Push four times a day PRN  ondansetron Injectable 4 milliGRAM(s) IV Push every 6 hours PRN  pantoprazole  Injectable 40 milliGRAM(s) IV Push every 12 hours  phenylephrine    Infusion 0.2 MICROgram(s)/kG/Min IV Continuous <Continuous>  tiotropium 18 MICROgram(s) Capsule 1 Capsule(s) Inhalation daily  valproate sodium IVPB 500 milliGRAM(s) IV Intermittent every 12 hours  zinc oxide 20% Ointment 1 Application(s) Topical daily      ALLERGIES:IV Contrast (Anaphylaxis)  shellfish. (Anaphylaxis)      REVIEW OF SYSTEMS:  Comprehensive Review of Systems negative except as noted in HPI      PHYSICAL EXAMINATION:  RESPIRATORY: Clear to auscultation bilaterally, respirations non-labored.  CARDIAC: RR, normal S1S2, no murmurs.  ABDOMEN: soft, BS present, no masses, no hernias, no peritoneal signs, no KLS, nontender.  VASCULAR: Equal and normal pulses.  MUSCULOSKELETAL: Full ROM, no deformities.      T(C): 37.4 (04-25-19 @ 15:00), Max: 37.6 (04-24-19 @ 21:00)  HR: 118 (04-25-19 @ 15:00) (61 - 123)  BP: 87/64 (04-25-19 @ 15:00) (74/61 - 134/83)  RR: 20 (04-25-19 @ 15:00) (16 - 34)  SpO2: 96% (04-25-19 @ 15:00) (83% - 100%)                          8.2    10.2  )-----------( 199      ( 25 Apr 2019 12:00 )             24.1       04-25    141  |  104  |  9   ----------------------------<  96  3.8   |  27  |  0.58    Ca    8.7      25 Apr 2019 04:40  Phos  3.2     04-25  Mg     1.7     04-25    TPro  6.3  /  Alb  2.6<L>  /  TBili  0.2  /  DBili  x   /  AST  21  /  ALT  19  /  AlkPhos  74  04-23

## 2019-04-25 NOTE — PROGRESS NOTE ADULT - SUBJECTIVE AND OBJECTIVE BOX
Follow up for tachycardia  SUBJ:    comfortable family at bedside      [facility]  Interstitial lung disease  NHL (non-Hodgkin's lymphoma)  Transient cerebral ischemia, unspecified type  Mitral prolapse  Pulmonary disease      MEDICATIONS  (STANDING):  ALPRAZolam 0.5 milliGRAM(s) Oral three times a day  buDESOnide   0.5 milliGRAM(s) Respule 0.5 milliGRAM(s) Inhalation two times a day  dextrose 5% + sodium chloride 0.45%. 1000 milliLiter(s) (75 mL/Hr) IV Continuous <Continuous>  lidocaine   Patch 1 Patch Transdermal daily  pantoprazole  Injectable 40 milliGRAM(s) IV Push every 12 hours  phenylephrine    Infusion 0.2 MICROgram(s)/kG/Min (2.167 mL/Hr) IV Continuous <Continuous>  tiotropium 18 MICROgram(s) Capsule 1 Capsule(s) Inhalation daily  valproate sodium IVPB 500 milliGRAM(s) IV Intermittent every 12 hours  zinc oxide 20% Ointment 1 Application(s) Topical daily    MEDICATIONS  (PRN):  acetaminophen   Tablet .. 650 milliGRAM(s) Oral every 6 hours PRN Temp greater or equal to 38C (100.4F), Mild Pain (1 - 3)  ALBUTerol/ipratropium for Nebulization 3 milliLiter(s) Nebulizer every 6 hours PRN Shortness of Breath and/or Wheezing  LORazepam   Injectable 1 milliGRAM(s) IV Push four times a day PRN Benzo Withdrawal  ondansetron Injectable 4 milliGRAM(s) IV Push every 6 hours PRN Nausea and/or Vomiting        PHYSICAL EXAM:  Vital Signs Last 24 Hrs  T(C): 37.4 (25 Apr 2019 15:00), Max: 37.6 (24 Apr 2019 21:00)  T(F): 99.3 (25 Apr 2019 15:00), Max: 99.7 (24 Apr 2019 21:00)  HR: 118 (25 Apr 2019 15:00) (61 - 123)  BP: 87/64 (25 Apr 2019 15:00) (74/61 - 134/83)  BP(mean): 72 (25 Apr 2019 15:00) (60 - 96)  RR: 20 (25 Apr 2019 15:00) (16 - 34)  SpO2: 96% (25 Apr 2019 15:00) (83% - 100%)    GENERAL: NAD, appears chronically ill sedate  HEAD:  Atraumatic, Normocephalic  EYES: EOMI, PERRLA, conjunctiva and sclera clear  ENT: Moist mucous membranes,  NECK: Supple, No JVD, no bruits  CHEST/LUNG: Clear to percussion bilaterally; No rales, rhonchi, wheezing, or rubs  HEART: Regular rate and rhythm; No murmurs, rubs, or gallops PMI non displaced.  ABDOMEN: Soft, Nontender, Nondistended; Bowel sounds present  EXTREMITIES:  2+ Peripheral Pulses, No clubbing, cyanosis, or edema  SKIN: No rashes or lesions  NERVOUS SYSTEM:  Cranial Nerves II-XII intact      TELEMETRY:      ECG:    < from: 12 Lead ECG (04.23.19 @ 05:10) >  Ventricular Rate 131 BPM    Atrial Rate 131 BPM    P-R Interval 130 ms    QRS Duration 76 ms    Q-T Interval 300 ms    QTC Calculation(Bezet) 443 ms    P Axis 61 degrees    R Axis 51 degrees    T Axis 5 degrees    Diagnosis Line Sinus tachycardia  Nonspecific ST and T wave abnormality  Baseline wander  Artifact is present.   Abnormal ECG  When compared with ECG of 22-APR-2019 13:23,  Non-specific change in ST segment in Anterior leads    Confirmed by ARABELLA LEMUS MD ([id number]) on 4/23/2019 1:04:22 PM    < end of copied text >    ECHO:    < from: TTE Echo Doppler w/o Cont (08.21.18 @ 14:19) >  INTERPRETATION:  INDICATION: Shortness of breath    Conclusion:   1. There is normal left ventricular size and left ventricular systolic   function. The left ventricular ejection fraction is approximate 60%.  2. There are no gross wall motion abnormalities.  3. There is normal left ventricular wall thickness.  4. There is mild left atrial enlargement.  5. There is diastolic dysfunction.  6. There is classic mitralvalve prolapse with moderate to severe mitral   regurgitation.  7. There is mild to moderate tricuspid regurgitation with a right   ventricular systolic pressure of 39 mmHg consistent with borderline   pulmonary hypertension.  8. There is a trileaflet aortic valve with minimal atherosclerotic change   and normal leaflet excursion without significant aortic valve stenosis or   regurgitation.  9. There is trivial pulmonic insufficiency.  10. There is normal right atrial and right ventricular size and systolic   function.  11. There is a trivial to small pericardial effusion without any evidence   for tamponade.    Blood Pressure 98/64 mmHg           Dimensions:    LA 2.6 cm       Normal Values: 2.0 - 4.0 cm    Ao 3.4 cm        Normal Values: 2.0 - 3.8 cm  IVSd 1.0 cm       Normal Values: 0.6 - 1.2 cm  PWd 1.0 cm       Normal Values: 0.6 - 1.1 cm  LVIDd 4.8 cm         Normal Values: 3.0 - 5.6 cm  LVIDs 2.7 cm         Normal Values: 1.8 - 4.0 cm            JORDAN ORTIZ M.D., ATTENDING CARDIOLOGIST  This document has been electronically signed. Aug 22 2018  7:59AM    < end of copied text >      LABS:                        8.2    10.2  )-----------( 199      ( 25 Apr 2019 12:00 )             24.1     04-25    141  |  104  |  9   ----------------------------<  96  3.8   |  27  |  0.58    Ca    8.7      25 Apr 2019 04:40  Phos  3.2     04-25  Mg     1.7     04-25      I&O's Summary    24 Apr 2019 07:01  -  25 Apr 2019 07:00  --------------------------------------------------------  IN: 3025.2 mL / OUT: 16 mL / NET: 3009.2 mL    25 Apr 2019 07:01  -  25 Apr 2019 16:08  --------------------------------------------------------  IN: 703 mL / OUT: 0 mL / NET: 703 mL      BNP    RADIOLOGY & ADDITIONAL STUDIES:    < from: Xray Chest 1 View-PORTABLE IMMEDIATE (04.25.19 @ 05:58) >  EXAM:  XR CHEST PORTABLE IMMED 1V      PROCEDURE DATE:  04/25/2019        INTERPRETATION:  AP erect chest on April 25, 2019 at 5:38 AM. Patient is   hypoxic.    Heart does not suggest enlargement.    Enlargement in the left hilar area again noted.    Extensive scarring is seen particularly on the left and particularly in   the left upper lobe.    There is bilateral apical scarring.    There is also scarring in the left base and scattered smaller scars   elsewhere in the right lung.    The present film shows that there is now acute appearing type infiltrate   emanating off the left upper hilum new since March 12 and likely   increased from April 22.    IMPRESSION: New left upper lobe infiltrate on top of extensive chronic   lung disease.          KAMAR CANO M.D., ATTENDING RADIOLOGIST  This document has been electronically signed. Apr 25 2019  8:07AM    < end of copied text >      ECHO:

## 2019-04-25 NOTE — PROGRESS NOTE ADULT - ASSESSMENT
Physical exam:  GENERAL: Alert, Oriented, Restless this morning  HEENT: Pupils equal, reactive to light.  Symmetric. No JVD, Moist MM  PULM: Bilateral air entry, Clear to auscultation bilaterally, No Rales, No Rhonchi, No Wheezing  CVS: S1, S2,  + Murmur  ABD: Soft, nondistended, nontender, normoactive bowel sounds,   EXT: Soft, No edema, nontender, No Cyanosis or Clubbing   Vascular: Warm Extremities, Normal Capillary refill, Normal Distal Pulses  SKIN: Warm and well perfused, no rashes noted. Pale with poor capillary refill  NEURO: Alert, oriented, interactive, nonfocal, follows commands. Weak left upper extremity strength 1/5. Other three extremities 4/5. Mild tremulousness.  PSYC: Calm, + Insight.    Assessment:  59 yr old female PMHx of MVP, chronic SDH, interstitial lung disease/pneumothorax, pulmonary nodules, meniere's, non hodgkins [place] [facility] [place] TIA, tachycardia, occipital neuralgia, and neuropathy being admitted to the ICU for acute GI bleeding with hypotension.     Assessment  1. Hypovolemic shock - IV hydration. Maintain blood pressure with MAP >65. Hold BP meds for now. Titrate vasopressors.   2. Acute blood loss anemia - Monitor hgb q 8 hours. s/p 2 unit PRBC transfusion yesterday  3. Gastrointestinal hemorrhage - GI/Surgery follow up requested. Recommending CT angiogram, but patient with contrast allergy (anaphylaxis). Patient refusing contrast administration even if premedicated. EGD with out active PUD. No more episodes of bloody bowel movement. Advance diet per GI. Switch to D5w 0.45% saline.  4. CVA - Left upper extremity spasm/paresis. Holding aspirin for now given acute bleeding with shock   5. Seizures - Cont. IV meds  6. Intersitial lung disease - Pulmonary follow up. Cont. Inhalers for now. Currently does not need oxygen support.   7. Anxiety - continue xanax. Monitor for signs of benzo withdrawal.    SCD for DVT prophylaxis.   36 min of critical care time spent on this patient's care

## 2019-04-25 NOTE — PROGRESS NOTE ADULT - SUBJECTIVE AND OBJECTIVE BOX
Follow-up Critical Care Progress Note  Chief Complaint : Other ulcerative colitis with rectal bleeding    Had endoscopy yesterday, no active upper GI bleeding. Required vasopressor support yesterday. Overnight no more episodes of bloody bowel movements. Though remains restless this morning. Wants to eat.      Allergies :IV Contrast (Anaphylaxis)  shellfish. (Anaphylaxis)    PAST MEDICAL & SURGICAL HISTORY:  Interstitial lung disease: on home o2 prn  NHL (non-Hodgkin's lymphoma): Agem 43 sp chemo/rt/stem cell  Transient cerebral ischemia, unspecified type  Mitral prolapse  History of tonsillectomy  History of appendectomy    Medications:  MEDICATIONS  (STANDING):  ALPRAZolam 0.5 milliGRAM(s) Oral three times a day  buDESOnide   0.5 milliGRAM(s) Respule 0.5 milliGRAM(s) Inhalation two times a day  dextrose 5% + sodium chloride 0.45%. 1000 milliLiter(s) (75 mL/Hr) IV Continuous <Continuous>  lidocaine   Patch 1 Patch Transdermal daily  pantoprazole  Injectable 40 milliGRAM(s) IV Push every 12 hours  phenylephrine    Infusion 0.2 MICROgram(s)/kG/Min (2.167 mL/Hr) IV Continuous <Continuous>  tiotropium 18 MICROgram(s) Capsule 1 Capsule(s) Inhalation daily  valproate sodium IVPB 500 milliGRAM(s) IV Intermittent every 12 hours  zinc oxide 20% Ointment 1 Application(s) Topical daily    MEDICATIONS  (PRN):  acetaminophen   Tablet .. 650 milliGRAM(s) Oral every 6 hours PRN Temp greater or equal to 38C (100.4F), Mild Pain (1 - 3)  ALBUTerol/ipratropium for Nebulization 3 milliLiter(s) Nebulizer every 6 hours PRN Shortness of Breath and/or Wheezing  ondansetron Injectable 4 milliGRAM(s) IV Push every 6 hours PRN Nausea and/or Vomiting    LABS:                      9.4    12.4  )-----------( 221      ( 25 Apr 2019 04:40 )             27.5     04-25  141  |  104  |  9   ----------------------------<  96  3.8   |  27  |  0.58    Ca    8.7      25 Apr 2019 04:40  Phos  3.2     04-25  Mg     1.7     04-25    TPro  6.3  /  Alb  2.6<L>  /  TBili  0.2  /  DBili  x   /  AST  21  /  ALT  19  /  AlkPhos  74  04-23    PT/INR - ( 23 Apr 2019 15:20 )   PT: 10.6 sec;   INR: 0.95 ratio      PTT - ( 23 Apr 2019 15:20 )  PTT:31.2 sec    VITALS:  T(C): 36.5 (04-25-19 @ 08:00), Max: 37.6 (04-24-19 @ 21:00)  T(F): 97.7 (04-25-19 @ 08:00), Max: 99.7 (04-24-19 @ 21:00)  HR: 102 (04-25-19 @ 08:00) (61 - 138)  BP: 106/62 (04-25-19 @ 08:00) (56/44 - 134/83)  BP(mean): 75 (04-25-19 @ 08:00) (50 - 96)  ABP: --  ABP(mean): --  RR: 20 (04-25-19 @ 08:00) (16 - 34)  SpO2: 99% (04-25-19 @ 08:52) (76% - 100%)  CVP(mm Hg): --  CVP(cm H2O): --    Ins and Outs     04-24-19 @ 07:01  -  04-25-19 @ 07:00  --------------------------------------------------------  IN: 3025.2 mL / OUT: 16 mL / NET: 3009.2 mL    04-25-19 @ 07:01  -  04-25-19 @ 09:19  --------------------------------------------------------  IN: 200 mL / OUT: 0 mL / NET: 200 mL    Height (cm): 175.26 (04-23-19 @ 05:55)

## 2019-04-25 NOTE — PROGRESS NOTE ADULT - ASSESSMENT
60 y/o female pmhx of MVP, chronic subdural hematoma, interstitial lung disease pneumothorax seizures, pulmonary nodules, Meniere's disease, non hodgkin's lymphoma s/p SCD/XRT/Chemo 2003, TIA, tachycardia, occipital neuralgia, neuropathy, recently admitted to [facility]  [facility]. Now admitted to ICU w/ hypovolemic shock 2/2 GI bleed, acute blood loss anemia.

## 2019-04-25 NOTE — PROGRESS NOTE ADULT - SUBJECTIVE AND OBJECTIVE BOX
Patient is a 59y old  Female who presents with a chief complaint of Rectal bleed (25 Apr 2019 16:08)      BRIEF HOSPITAL COURSE:  60 y/o female pmhx of MVP, chronic subdural hematoma, interstitial lung disease pneumothorax seizures, pulmonary nodules, Meniere's disease, non hodgkin's lymphoma s/p SCD/XRT/Chemo 2003, TIA, tachycardia, occipital neuralgia, neuropathy, recently admitted to [place]  rehab on 4/19, was admitted to telemetry from rehab for bloody BMs, while in tele became hypotensive transferred to ICU. Was given 2 units PRBC, 2FFP and 1 plt, at this time.  Admitted to ICU for hypovolemic shock 2/2 GI bleed, requiring IV vasopressor therapy.     Events last 24 hours: No more bloody BM's today. Vasopressor requirements have come down, still requiring low dose. H/H dropped to 8.3, but holding. Started Midodrine tonight     PAST MEDICAL & SURGICAL HISTORY:  Interstitial lung disease: on home o2 prn  NHL (non-Hodgkin's lymphoma): Agem 43 sp chemo/rt/stem cell  Transient cerebral ischemia, unspecified type  Mitral prolapse  History of tonsillectomy  History of appendectomy      Review of Systems:  CONSTITUTIONAL: No fever, chills, or fatigue  EYES: No eye pain, visual disturbances, or discharge  ENMT:  No difficulty hearing, tinnitus, vertigo; No sinus or throat pain  NECK: No pain or stiffness  RESPIRATORY: No cough, wheezing, chills or hemoptysis; No shortness of breath  CARDIOVASCULAR: No chest pain, palpitations, dizziness, or leg swelling  GASTROINTESTINAL: No abdominal or epigastric pain. No nausea, vomiting, or hematemesis; No diarrhea or constipation. No melena or hematochezia.  GENITOURINARY: No dysuria, frequency, hematuria, or incontinence  NEUROLOGICAL: No headaches, memory loss, loss of strength, numbness, or tremors  SKIN: No itching, burning, rashes, or lesions   MUSCULOSKELETAL: No joint pain or swelling; No muscle, back, or extremity pain  PSYCHIATRIC: No depression, anxiety, mood swings, or difficulty sleeping      Medications:    midodrine 5 milliGRAM(s) Oral every 8 hours  phenylephrine    Infusion 0.2 MICROgram(s)/kG/Min IV Continuous <Continuous>  ALBUTerol/ipratropium for Nebulization 3 milliLiter(s) Nebulizer every 6 hours PRN  buDESOnide   0.5 milliGRAM(s) Respule 0.5 milliGRAM(s) Inhalation two times a day  tiotropium 18 MICROgram(s) Capsule 1 Capsule(s) Inhalation daily  acetaminophen   Tablet .. 650 milliGRAM(s) Oral every 6 hours PRN  ALPRAZolam 0.5 milliGRAM(s) Oral three times a day  LORazepam   Injectable 1 milliGRAM(s) IV Push four times a day PRN  ondansetron Injectable 4 milliGRAM(s) IV Push every 6 hours PRN  valproate sodium IVPB 500 milliGRAM(s) IV Intermittent every 12 hours  pantoprazole  Injectable 40 milliGRAM(s) IV Push every 12 hours  dextrose 5% + sodium chloride 0.45%. 1000 milliLiter(s) IV Continuous <Continuous>  lidocaine   Patch 1 Patch Transdermal daily  zinc oxide 20% Ointment 1 Application(s) Topical daily            ICU Vital Signs Last 24 Hrs  T(C): 36.4 (26 Apr 2019 01:00), Max: 37.4 (25 Apr 2019 15:00)  T(F): 97.5 (26 Apr 2019 01:00), Max: 99.3 (25 Apr 2019 15:00)  HR: 93 (26 Apr 2019 05:00) (89 - 123)  BP: 83/54 (26 Apr 2019 05:00) (72/52 - 106/67)  BP(mean): 63 (26 Apr 2019 05:00) (58 - 78)  RR: 19 (26 Apr 2019 05:00) (18 - 33)  SpO2: 100% (26 Apr 2019 05:00) (83% - 100%)          I&O's Detail    24 Apr 2019 07:01  -  25 Apr 2019 07:00  --------------------------------------------------------  IN:    Lactated Ringers IV Bolus: 1000 mL    lactated ringers.: 1500 mL    Packed Red Blood Cells: 277 mL    phenylephrine   Infusion: 248.2 mL  Total IN: 3025.2 mL    OUT:    Stool: 16 mL  Total OUT: 16 mL    Total NET: 3009.2 mL      25 Apr 2019 07:01  -  26 Apr 2019 06:07  --------------------------------------------------------  IN:    dextrose 5% + sodium chloride 0.45%.: 750 mL    lactated ringers.: 200 mL    phenylephrine   Infusion: 104.6 mL  Total IN: 1054.6 mL    OUT:  Total OUT: 0 mL    Total NET: 1054.6 mL            LABS:                        8.3    6.8   )-----------( 196      ( 26 Apr 2019 04:40 )             25.0     04-26    138  |  103  |  4<L>  ----------------------------<  110<H>  3.2<L>   |  29  |  0.60    Ca    8.5      26 Apr 2019 04:40  Phos  3.8     04-26  Mg     2.2     04-26            CAPILLARY BLOOD GLUCOSE            CULTURES:    Physical Examination:    General:  Alert/ Awake. No acute distress     HEENT: Pupils equal, reactive to light.  Symmetric.    PULM: Coarse breath sounds b/l., no significant sputum production. No wheeze/ rhonchi     CVS: Regular rate and rhythm, no murmurs, rubs, or gallops    ABD: Soft, nondistended, nontender, normoactive bowel sounds, no masses    EXT: No edema, nontender    SKIN: Warm and well perfused, no rashes noted.    NEURO: A&Ox3, strength 5/5 all extremities, cranial nerves grossly intact, no focal deficit    RADIOLOGY: < from: Xray Chest 1 View-PORTABLE IMMEDIATE (04.25.19 @ 05:58) >  AM:  XR CHEST PORTABLE IMMED 1V      PROCEDURE DATE:  04/25/2019        INTERPRETATION:  AP erect chest on April 25, 2019 at 5:38 AM. Patient is   hypoxic.    Heart does not suggest enlargement.    Enlargement in the left hilar area again noted.    Extensive scarring is seen particularly on the left and particularly in   the left upper lobe.    There is bilateral apical scarring.    There is also scarring in the left base and scattered smaller scars   elsewhere in the right lung.    The present film shows that there is now acute appearing type infiltrate   emanating off the left upper hilum new since March 12 and likely   increased from April 22.    IMPRESSION: New left upper lobe infiltrate on top of extensive chronic   lung disease.    < end of copied text >      CRITICAL CARE TIME SPENT:  33 min  Evaluating/treating patient, reviewing data/labs/imaging, discussing case with multidisciplinary team, discussing plan/goals of care with patient/family. Non-inclusive of procedure time.

## 2019-04-26 DIAGNOSIS — Z92.3 PERSONAL HISTORY OF IRRADIATION: ICD-10-CM

## 2019-04-26 DIAGNOSIS — L25.9 UNSPECIFIED CONTACT DERMATITIS, UNSPECIFIED CAUSE: ICD-10-CM

## 2019-04-26 DIAGNOSIS — D68.59 OTHER PRIMARY THROMBOPHILIA: ICD-10-CM

## 2019-04-26 DIAGNOSIS — R27.8 OTHER LACK OF COORDINATION: ICD-10-CM

## 2019-04-26 DIAGNOSIS — Z85.72 PERSONAL HISTORY OF NON-HODGKIN LYMPHOMAS: ICD-10-CM

## 2019-04-26 DIAGNOSIS — M54.2 CERVICALGIA: ICD-10-CM

## 2019-04-26 DIAGNOSIS — D64.9 ANEMIA, UNSPECIFIED: ICD-10-CM

## 2019-04-26 DIAGNOSIS — Z99.81 DEPENDENCE ON SUPPLEMENTAL OXYGEN: ICD-10-CM

## 2019-04-26 DIAGNOSIS — J84.9 INTERSTITIAL PULMONARY DISEASE, UNSPECIFIED: ICD-10-CM

## 2019-04-26 DIAGNOSIS — H81.09 MENIERE'S DISEASE, UNSPECIFIED EAR: ICD-10-CM

## 2019-04-26 DIAGNOSIS — E43 UNSPECIFIED SEVERE PROTEIN-CALORIE MALNUTRITION: ICD-10-CM

## 2019-04-26 DIAGNOSIS — R20.8 OTHER DISTURBANCES OF SKIN SENSATION: ICD-10-CM

## 2019-04-26 DIAGNOSIS — G62.9 POLYNEUROPATHY, UNSPECIFIED: ICD-10-CM

## 2019-04-26 DIAGNOSIS — E88.09 OTHER DISORDERS OF PLASMA-PROTEIN METABOLISM, NOT ELSEWHERE CLASSIFIED: ICD-10-CM

## 2019-04-26 DIAGNOSIS — M54.81 OCCIPITAL NEURALGIA: ICD-10-CM

## 2019-04-26 DIAGNOSIS — G82.50 QUADRIPLEGIA, UNSPECIFIED: ICD-10-CM

## 2019-04-26 DIAGNOSIS — K60.2 ANAL FISSURE, UNSPECIFIED: ICD-10-CM

## 2019-04-26 DIAGNOSIS — R19.7 DIARRHEA, UNSPECIFIED: ICD-10-CM

## 2019-04-26 DIAGNOSIS — K64.4 RESIDUAL HEMORRHOIDAL SKIN TAGS: ICD-10-CM

## 2019-04-26 DIAGNOSIS — R91.8 OTHER NONSPECIFIC ABNORMAL FINDING OF LUNG FIELD: ICD-10-CM

## 2019-04-26 DIAGNOSIS — R47.1 DYSARTHRIA AND ANARTHRIA: ICD-10-CM

## 2019-04-26 DIAGNOSIS — R53.83 OTHER FATIGUE: ICD-10-CM

## 2019-04-26 DIAGNOSIS — R00.0 TACHYCARDIA, UNSPECIFIED: ICD-10-CM

## 2019-04-26 DIAGNOSIS — R56.9 UNSPECIFIED CONVULSIONS: ICD-10-CM

## 2019-04-26 DIAGNOSIS — I62.03 NONTRAUMATIC CHRONIC SUBDURAL HEMORRHAGE: ICD-10-CM

## 2019-04-26 DIAGNOSIS — Z92.21 PERSONAL HISTORY OF ANTINEOPLASTIC CHEMOTHERAPY: ICD-10-CM

## 2019-04-26 DIAGNOSIS — Z51.89 ENCOUNTER FOR OTHER SPECIFIED AFTERCARE: ICD-10-CM

## 2019-04-26 DIAGNOSIS — D47.3 ESSENTIAL (HEMORRHAGIC) THROMBOCYTHEMIA: ICD-10-CM

## 2019-04-26 DIAGNOSIS — I34.1 NONRHEUMATIC MITRAL (VALVE) PROLAPSE: ICD-10-CM

## 2019-04-26 DIAGNOSIS — K62.5 HEMORRHAGE OF ANUS AND RECTUM: ICD-10-CM

## 2019-04-26 LAB
ANION GAP SERPL CALC-SCNC: 6 MMOL/L — SIGNIFICANT CHANGE UP (ref 5–17)
BUN SERPL-MCNC: 4 MG/DL — LOW (ref 7–23)
CALCIUM SERPL-MCNC: 8.5 MG/DL — SIGNIFICANT CHANGE UP (ref 8.4–10.5)
CHLORIDE SERPL-SCNC: 103 MMOL/L — SIGNIFICANT CHANGE UP (ref 96–108)
CO2 SERPL-SCNC: 29 MMOL/L — SIGNIFICANT CHANGE UP (ref 22–31)
CREAT SERPL-MCNC: 0.6 MG/DL — SIGNIFICANT CHANGE UP (ref 0.5–1.3)
GLUCOSE SERPL-MCNC: 110 MG/DL — HIGH (ref 70–99)
HCT VFR BLD CALC: 25 % — LOW (ref 34.5–45)
HCT VFR BLD CALC: 34.9 % — SIGNIFICANT CHANGE UP (ref 34.5–45)
HGB BLD-MCNC: 11.6 G/DL — SIGNIFICANT CHANGE UP (ref 11.5–15.5)
HGB BLD-MCNC: 8.3 G/DL — LOW (ref 11.5–15.5)
MAGNESIUM SERPL-MCNC: 2.2 MG/DL — SIGNIFICANT CHANGE UP (ref 1.6–2.6)
MCHC RBC-ENTMCNC: 30.3 PG — SIGNIFICANT CHANGE UP (ref 27–34)
MCHC RBC-ENTMCNC: 30.4 PG — SIGNIFICANT CHANGE UP (ref 27–34)
MCHC RBC-ENTMCNC: 33.1 GM/DL — SIGNIFICANT CHANGE UP (ref 32–36)
MCHC RBC-ENTMCNC: 33.3 GM/DL — SIGNIFICANT CHANGE UP (ref 32–36)
MCV RBC AUTO: 91 FL — SIGNIFICANT CHANGE UP (ref 80–100)
MCV RBC AUTO: 91.9 FL — SIGNIFICANT CHANGE UP (ref 80–100)
PHOSPHATE SERPL-MCNC: 3.8 MG/DL — SIGNIFICANT CHANGE UP (ref 2.5–4.5)
PLATELET # BLD AUTO: 196 K/UL — SIGNIFICANT CHANGE UP (ref 150–400)
PLATELET # BLD AUTO: 256 K/UL — SIGNIFICANT CHANGE UP (ref 150–400)
POTASSIUM SERPL-MCNC: 3.2 MMOL/L — LOW (ref 3.5–5.3)
POTASSIUM SERPL-SCNC: 3.2 MMOL/L — LOW (ref 3.5–5.3)
RBC # BLD: 2.75 M/UL — LOW (ref 3.8–5.2)
RBC # BLD: 3.8 M/UL — SIGNIFICANT CHANGE UP (ref 3.8–5.2)
RBC # FLD: 14.4 % — SIGNIFICANT CHANGE UP (ref 10.3–14.5)
RBC # FLD: 14.4 % — SIGNIFICANT CHANGE UP (ref 10.3–14.5)
SODIUM SERPL-SCNC: 138 MMOL/L — SIGNIFICANT CHANGE UP (ref 135–145)
WBC # BLD: 6.8 K/UL — SIGNIFICANT CHANGE UP (ref 3.8–10.5)
WBC # BLD: 8.9 K/UL — SIGNIFICANT CHANGE UP (ref 3.8–10.5)
WBC # FLD AUTO: 6.8 K/UL — SIGNIFICANT CHANGE UP (ref 3.8–10.5)
WBC # FLD AUTO: 8.9 K/UL — SIGNIFICANT CHANGE UP (ref 3.8–10.5)

## 2019-04-26 PROCEDURE — 80053 COMPREHEN METABOLIC PANEL: CPT

## 2019-04-26 PROCEDURE — 99232 SBSQ HOSP IP/OBS MODERATE 35: CPT

## 2019-04-26 PROCEDURE — 86850 RBC ANTIBODY SCREEN: CPT

## 2019-04-26 PROCEDURE — 82728 ASSAY OF FERRITIN: CPT

## 2019-04-26 PROCEDURE — 86923 COMPATIBILITY TEST ELECTRIC: CPT

## 2019-04-26 PROCEDURE — 92507 TX SP LANG VOICE COMM INDIV: CPT

## 2019-04-26 PROCEDURE — 83550 IRON BINDING TEST: CPT

## 2019-04-26 PROCEDURE — 99233 SBSQ HOSP IP/OBS HIGH 50: CPT

## 2019-04-26 PROCEDURE — 85610 PROTHROMBIN TIME: CPT

## 2019-04-26 PROCEDURE — 93005 ELECTROCARDIOGRAM TRACING: CPT

## 2019-04-26 PROCEDURE — 86901 BLOOD TYPING SEROLOGIC RH(D): CPT

## 2019-04-26 PROCEDURE — 83615 LACTATE (LD) (LDH) ENZYME: CPT

## 2019-04-26 PROCEDURE — 85027 COMPLETE CBC AUTOMATED: CPT

## 2019-04-26 PROCEDURE — 97530 THERAPEUTIC ACTIVITIES: CPT

## 2019-04-26 PROCEDURE — 71045 X-RAY EXAM CHEST 1 VIEW: CPT

## 2019-04-26 PROCEDURE — 97167 OT EVAL HIGH COMPLEX 60 MIN: CPT

## 2019-04-26 PROCEDURE — 36415 COLL VENOUS BLD VENIPUNCTURE: CPT

## 2019-04-26 PROCEDURE — 92610 EVALUATE SWALLOWING FUNCTION: CPT

## 2019-04-26 PROCEDURE — 80048 BASIC METABOLIC PNL TOTAL CA: CPT

## 2019-04-26 PROCEDURE — 82607 VITAMIN B-12: CPT

## 2019-04-26 PROCEDURE — 82746 ASSAY OF FOLIC ACID SERUM: CPT

## 2019-04-26 PROCEDURE — 94640 AIRWAY INHALATION TREATMENT: CPT

## 2019-04-26 PROCEDURE — 82962 GLUCOSE BLOOD TEST: CPT

## 2019-04-26 PROCEDURE — 86900 BLOOD TYPING SEROLOGIC ABO: CPT

## 2019-04-26 PROCEDURE — 93306 TTE W/DOPPLER COMPLETE: CPT | Mod: 26

## 2019-04-26 PROCEDURE — 83540 ASSAY OF IRON: CPT

## 2019-04-26 PROCEDURE — 80164 ASSAY DIPROPYLACETIC ACD TOT: CPT

## 2019-04-26 PROCEDURE — 97112 NEUROMUSCULAR REEDUCATION: CPT

## 2019-04-26 RX ORDER — MIDODRINE HYDROCHLORIDE 2.5 MG/1
5 TABLET ORAL ONCE
Qty: 0 | Refills: 0 | Status: COMPLETED | OUTPATIENT
Start: 2019-04-26 | End: 2019-04-26

## 2019-04-26 RX ORDER — MIDODRINE HYDROCHLORIDE 2.5 MG/1
5 TABLET ORAL EVERY 8 HOURS
Qty: 0 | Refills: 0 | Status: DISCONTINUED | OUTPATIENT
Start: 2019-04-26 | End: 2019-04-26

## 2019-04-26 RX ORDER — POTASSIUM CHLORIDE 20 MEQ
40 PACKET (EA) ORAL EVERY 4 HOURS
Qty: 0 | Refills: 0 | Status: COMPLETED | OUTPATIENT
Start: 2019-04-26 | End: 2019-04-26

## 2019-04-26 RX ORDER — SODIUM CHLORIDE 9 MG/ML
1000 INJECTION, SOLUTION INTRAVENOUS
Qty: 0 | Refills: 0 | Status: DISCONTINUED | OUTPATIENT
Start: 2019-04-26 | End: 2019-04-27

## 2019-04-26 RX ORDER — MIDODRINE HYDROCHLORIDE 2.5 MG/1
10 TABLET ORAL THREE TIMES A DAY
Qty: 0 | Refills: 0 | Status: DISCONTINUED | OUTPATIENT
Start: 2019-04-26 | End: 2019-04-28

## 2019-04-26 RX ADMIN — Medication 650 MILLIGRAM(S): at 09:45

## 2019-04-26 RX ADMIN — ZINC OXIDE 1 APPLICATION(S): 200 OINTMENT TOPICAL at 14:07

## 2019-04-26 RX ADMIN — Medication 40 MILLIEQUIVALENT(S): at 14:06

## 2019-04-26 RX ADMIN — Medication 0.5 MILLIGRAM(S): at 09:11

## 2019-04-26 RX ADMIN — Medication 3 MILLILITER(S): at 16:12

## 2019-04-26 RX ADMIN — SODIUM CHLORIDE 75 MILLILITER(S): 9 INJECTION, SOLUTION INTRAVENOUS at 15:36

## 2019-04-26 RX ADMIN — PANTOPRAZOLE SODIUM 40 MILLIGRAM(S): 20 TABLET, DELAYED RELEASE ORAL at 06:43

## 2019-04-26 RX ADMIN — Medication 650 MILLIGRAM(S): at 10:31

## 2019-04-26 RX ADMIN — Medication 650 MILLIGRAM(S): at 01:24

## 2019-04-26 RX ADMIN — Medication 650 MILLIGRAM(S): at 00:00

## 2019-04-26 RX ADMIN — MIDODRINE HYDROCHLORIDE 5 MILLIGRAM(S): 2.5 TABLET ORAL at 19:08

## 2019-04-26 RX ADMIN — ONDANSETRON 4 MILLIGRAM(S): 8 TABLET, FILM COATED ORAL at 21:37

## 2019-04-26 RX ADMIN — Medication 27.5 MILLIGRAM(S): at 18:37

## 2019-04-26 RX ADMIN — Medication 27.5 MILLIGRAM(S): at 06:35

## 2019-04-26 RX ADMIN — Medication 0.5 MILLIGRAM(S): at 22:14

## 2019-04-26 RX ADMIN — PHENYLEPHRINE HYDROCHLORIDE 2.17 MICROGRAM(S)/KG/MIN: 10 INJECTION INTRAVENOUS at 11:06

## 2019-04-26 RX ADMIN — TIOTROPIUM BROMIDE 1 CAPSULE(S): 18 CAPSULE ORAL; RESPIRATORY (INHALATION) at 09:11

## 2019-04-26 RX ADMIN — ONDANSETRON 4 MILLIGRAM(S): 8 TABLET, FILM COATED ORAL at 15:36

## 2019-04-26 RX ADMIN — MIDODRINE HYDROCHLORIDE 5 MILLIGRAM(S): 2.5 TABLET ORAL at 14:06

## 2019-04-26 RX ADMIN — Medication 40 MILLIEQUIVALENT(S): at 09:20

## 2019-04-26 RX ADMIN — MIDODRINE HYDROCHLORIDE 10 MILLIGRAM(S): 2.5 TABLET ORAL at 21:37

## 2019-04-26 RX ADMIN — MIDODRINE HYDROCHLORIDE 5 MILLIGRAM(S): 2.5 TABLET ORAL at 08:49

## 2019-04-26 RX ADMIN — PANTOPRAZOLE SODIUM 40 MILLIGRAM(S): 20 TABLET, DELAYED RELEASE ORAL at 18:37

## 2019-04-26 NOTE — CHART NOTE - NSCHARTNOTEFT_GEN_A_CORE
NUTRITION FOLLOW UP    SOURCE: Patient [X)   Family [ ]    Medical Record (X)    DIET: Clear liquids, no beef, no poultry     Spoke with pt this morning during breakfast- she reports no intolerances to clear liquids. States she is a little scared to upgrade to solids, but willing to try. Spoke with MD, pt likely for upgrade to soft solids today. Recommended adding Ensure Enlive BID with diet upgrade- pt states she was taking previously. GI following- no further bleeding over last 24 hours. Last BM noted on 4/25/19.       CURRENT WEIGHT:  (4/23) 127.4lbs  (4/25) 127.8lbs    PERTINENT MEDS:   Pertinent Medications: MEDICATIONS  (STANDING):  ALPRAZolam 0.5 milliGRAM(s) Oral three times a day  buDESOnide   0.5 milliGRAM(s) Respule 0.5 milliGRAM(s) Inhalation two times a day  dextrose 5% + sodium chloride 0.45%. 1000 milliLiter(s) (75 mL/Hr) IV Continuous <Continuous>  lidocaine   Patch 1 Patch Transdermal daily  midodrine 5 milliGRAM(s) Oral every 8 hours  pantoprazole  Injectable 40 milliGRAM(s) IV Push every 12 hours  phenylephrine    Infusion 0.2 MICROgram(s)/kG/Min (2.167 mL/Hr) IV Continuous <Continuous>  potassium chloride    Tablet ER 40 milliEquivalent(s) Oral every 4 hours  tiotropium 18 MICROgram(s) Capsule 1 Capsule(s) Inhalation daily  valproate sodium IVPB 500 milliGRAM(s) IV Intermittent every 12 hours  zinc oxide 20% Ointment 1 Application(s) Topical daily    MEDICATIONS  (PRN):  acetaminophen   Tablet .. 650 milliGRAM(s) Oral every 6 hours PRN Temp greater or equal to 38C (100.4F), Mild Pain (1 - 3)  ALBUTerol/ipratropium for Nebulization 3 milliLiter(s) Nebulizer every 6 hours PRN Shortness of Breath and/or Wheezing  LORazepam   Injectable 1 milliGRAM(s) IV Push four times a day PRN Benzo Withdrawal  ondansetron Injectable 4 milliGRAM(s) IV Push every 6 hours PRN Nausea and/or Vomiting      PERTINENT LABS:  (4/26) K+ 3.2, BUN 4, glucose 110 - receiving K+ supplementation     SKIN: intact, free of pressure ulcers  EDEMA: none  LAST BM: 4/25    ESTIMATED NEEDS:   [X] no change since previous assessment  [ ] recalculated:     PREVIOUS NUTRITION DIAGNOSIS:    1. Severe Malnutrition- recommend advance diet to best tolerated consistency + Ensure Enlive BID  2. Increased nutrient needs- recommend MVI with minerals     NUTRITION DIAGNOSIS is :  ( X)  Ongoing      (    ) Resolved,  RD will follow as per nutrition department protocol.    NEW NUTRITION DIAGNOSIS: N/A    NUTRITION RECOMMENDATIONS:   1. Recommend advance diet to soft, vegetarian + Ensure Enlive 8oz BID (700kcals, 40g protein)  2. Recommend add MVI with minerals daily     MONITORING AND EVALUATION:   1. PO intake/tolerance to diet prescription  2. weights  3. labs  4. BM's    RD to remain available  Nicole Benson RDN

## 2019-04-26 NOTE — PROGRESS NOTE ADULT - ASSESSMENT
58 y/o female with multiple medical issues who was transferred from rehab this morning s/p rapid response due to onset of lower GI Bleed. H/H 9.4/27.5 She is S/P 4 units PRBCs, 2 Units Plasma, 1 unit Plts since this admission. Abdomen soft, non-tender. GI bleeding subsided. EGD done on 4/24/19, no source of UGIB identified.

## 2019-04-26 NOTE — PROGRESS NOTE ADULT - SUBJECTIVE AND OBJECTIVE BOX
INTERVAL HPI/OVERNIGHT EVENTS:  HPI:  60 y/o female PMHx of MVP, chronic SDH, interstitial lung disease/pneumothorax, pulmonary nodules, meniere's, non hodgkins lymphoma (SCD/XRT/chemo at [facility] 2003), TIA, tachycardia, occipital neuralgia, and neuropathy. GI following patient due to persistent GI bleed. Patient seen and examined at bedside in CCU. S/P bedside EGD with no source of upper GI bleed identified. Denies nausea, vomiting, abdominal pain. No futher episode of GI bleeding since Thursday. Patient state she feels better.     MEDICATIONS  (STANDING):  ALPRAZolam 0.5 milliGRAM(s) Oral three times a day  buDESOnide   0.5 milliGRAM(s) Respule 0.5 milliGRAM(s) Inhalation two times a day  lactated ringers. 1000 milliLiter(s) (75 mL/Hr) IV Continuous <Continuous>  lidocaine   Patch 1 Patch Transdermal daily  midodrine 5 milliGRAM(s) Oral every 8 hours  pantoprazole  Injectable 40 milliGRAM(s) IV Push every 12 hours  phenylephrine    Infusion 0.2 MICROgram(s)/kG/Min (2.167 mL/Hr) IV Continuous <Continuous>  tiotropium 18 MICROgram(s) Capsule 1 Capsule(s) Inhalation daily  valproate sodium IVPB 500 milliGRAM(s) IV Intermittent every 12 hours  zinc oxide 20% Ointment 1 Application(s) Topical daily    MEDICATIONS  (PRN):  acetaminophen   Tablet .. 650 milliGRAM(s) Oral every 6 hours PRN Temp greater or equal to 38C (100.4F), Mild Pain (1 - 3)  ALBUTerol/ipratropium for Nebulization 3 milliLiter(s) Nebulizer every 6 hours PRN Shortness of Breath and/or Wheezing  LORazepam   Injectable 1 milliGRAM(s) IV Push four times a day PRN Benzo Withdrawal  ondansetron Injectable 4 milliGRAM(s) IV Push every 6 hours PRN Nausea and/or Vomiting      Allergies    IV Contrast (Anaphylaxis)  shellfish. (Anaphylaxis)    Intolerances        PHYSICAL EXAM:   Vital Signs:  Vital Signs Last 24 Hrs  T(C): 36.6 (26 Apr 2019 06:00), Max: 37 (25 Apr 2019 16:00)  T(F): 97.8 (26 Apr 2019 06:00), Max: 98.6 (25 Apr 2019 16:00)  HR: 89 (26 Apr 2019 10:00) (89 - 126)  BP: 109/71 (26 Apr 2019 10:00) (72/52 - 109/71)  BP(mean): 83 (26 Apr 2019 10:00) (58 - 83)  RR: 23 (26 Apr 2019 10:00) (18 - 30)  SpO2: 100% (26 Apr 2019 10:00) (83% - 100%)  Daily     Daily I&O's Summary    25 Apr 2019 07:01  -  26 Apr 2019 07:00  --------------------------------------------------------  IN: 1054.6 mL / OUT: 0 mL / NET: 1054.6 mL    26 Apr 2019 07:01  -  26 Apr 2019 15:25  --------------------------------------------------------  IN: 256.8 mL / OUT: 0 mL / NET: 256.8 mL      GENERAL:  Appears stated age,  HEENT:  NC/AT,  conjunctivae clear and pink  CHEST:  Full & symmetric excursion, no increased effort, breath sounds clear  HEART:  Regular rhythm, S1, S2  ABDOMEN:  Soft, non-tender, non-distended, normoactive bowel sounds  EXTEREMITIES:  no edema, L side weakness  SKIN:  No rash, warm/dry  NEURO:  Alert, oriented, lethargic         LABS:                        8.3    6.8   )-----------( 196      ( 26 Apr 2019 04:40 )             25.0     04-26    138  |  103  |  4<L>  ----------------------------<  110<H>  3.2<L>   |  29  |  0.60    Ca    8.5      26 Apr 2019 04:40  Phos  3.8     04-26  Mg     2.2     04-26          amylase   lipase  RADIOLOGY & ADDITIONAL TESTS:

## 2019-04-26 NOTE — PROGRESS NOTE ADULT - ASSESSMENT
Physical Examination:  GENERAL:               Alert, Oriented, No acute distress.    HEENT:                    No JVD, dry MM  PULM:                     Bilateral air entry, Clear to auscultation bilaterally, no significant sputum production, trace Rales, No Rhonchi, No Wheezing  CVS:                         S1, S2,  +Murmur  ABD:                        Soft, nondistended, nontender, normoactive bowel sounds,   EXT:                         No edema, nontender, No Cyanosis or Clubbing   Vascular:                Warm Extremities, Normal Capillary refill,   NEURO:                  Alert, oriented, interactive,  follows commands  PSYC:                      Calm, + Insight.    Assessment:  59 yr old female PMHx of MVP, chronic SDH, interstitial lung disease/pneumothorax, pulmonary nodules, meniere's, non hodgkins lymphoma (SCD/XRT/chemo at [facility] 2003), TIA, tachycardia, occipital neuralgia, and neuropathy being admitted to the ICU for acute GI bleeding with hypotension.     1. Hypovolemic shock -   2. Acute blood loss anemia   3. Gastrointestinal hemorrhage - Patient refused CTA  No more episodes of bloody bowel movement.   4. CVA - Left upper extremity spasm/paresis. Holding aspirin for now given acute bleeding with shock   5. Seizures - Cont. IV meds  6. Intersitial lung disease - Pulmonary follow up. Cont. Inhalers for now. Currently does not need oxygen support.   7. Anxiety - continue xanax. Monitor for signs of benzo withdrawal.    Plan  Transfuse 1 Unit PRBC for symptomatic Hypotension/shock  IVF   radha and midodrine for BP control   Check Echo  h/H Stable trend cbc q 12  will need eventual colonoscopy when off pressors.   Monitor CXR in am.   advance diet   check Echo   d/w GI NP, D/w cardio  SCD for DVT prophylaxis.       Sedation & Analgesia:	n/a  Diet/Nutrition:		Advance as tolerated/oral  GI PPx:			PPI    DVT Ppx:		venodynes 	due to active bleeding   	RISK                                                          Points  	[ ] Previous VTE                                           	3  	[ ] Thrombophilia                                        	2  	[ ] Lower limb paralysis                              	2   	[ ] Current Cancer                                       	2   	[ ] Immobilization > 24 hrs                        	1  	[x ] ICU/CCU stay > 24 hours                       	1  	[x ] Age > 60                                                   	1  		Total:[ 2]      Activity:		               Bedrest, Active ROM  Head of Bed:               35-45 deg  Glycemic Control:        n/a    Lines: PIV  Restraints were deemed necessary to prevent removal of life-sustaining devices [  ] YES   [    x]  NO    Disposition: ICU care    Goals of Care: Full

## 2019-04-26 NOTE — PROGRESS NOTE ADULT - SUBJECTIVE AND OBJECTIVE BOX
Follow-up Critical Care Progress Note  Chief Complaint : Other ulcerative colitis with rectal bleeding    pt feels well, no cp, sob, palp, n/v  + BM brown and formed  c/o feeling weak today    remains on ~1.5 mcg/kg/min To maintain BP      Allergies :IV Contrast (Anaphylaxis)  shellfish. (Anaphylaxis)      PAST MEDICAL & SURGICAL HISTORY:  Interstitial lung disease: on home o2 prn  NHL (non-Hodgkin's lymphoma): Agem 43 sp chemo/rt/stem cell  Transient cerebral ischemia, unspecified type  Mitral prolapse  History of tonsillectomy  History of appendectomy      Medications:  MEDICATIONS  (STANDING):  ALPRAZolam 0.5 milliGRAM(s) Oral three times a day  buDESOnide   0.5 milliGRAM(s) Respule 0.5 milliGRAM(s) Inhalation two times a day  dextrose 5% + sodium chloride 0.45%. 1000 milliLiter(s) (75 mL/Hr) IV Continuous <Continuous>  lidocaine   Patch 1 Patch Transdermal daily  midodrine 5 milliGRAM(s) Oral every 8 hours  pantoprazole  Injectable 40 milliGRAM(s) IV Push every 12 hours  phenylephrine    Infusion 0.2 MICROgram(s)/kG/Min (2.167 mL/Hr) IV Continuous <Continuous>  potassium chloride    Tablet ER 40 milliEquivalent(s) Oral every 4 hours  tiotropium 18 MICROgram(s) Capsule 1 Capsule(s) Inhalation daily  valproate sodium IVPB 500 milliGRAM(s) IV Intermittent every 12 hours  zinc oxide 20% Ointment 1 Application(s) Topical daily    MEDICATIONS  (PRN):  acetaminophen   Tablet .. 650 milliGRAM(s) Oral every 6 hours PRN Temp greater or equal to 38C (100.4F), Mild Pain (1 - 3)  ALBUTerol/ipratropium for Nebulization 3 milliLiter(s) Nebulizer every 6 hours PRN Shortness of Breath and/or Wheezing  LORazepam   Injectable 1 milliGRAM(s) IV Push four times a day PRN Benzo Withdrawal  ondansetron Injectable 4 milliGRAM(s) IV Push every 6 hours PRN Nausea and/or Vomiting      LABS:                        8.3    6.8   )-----------( 196      ( 26 Apr 2019 04:40 )             25.0     04-26    138  |  103  |  4<L>  ----------------------------<  110<H>  3.2<L>   |  29  |  0.60    Ca    8.5      26 Apr 2019 04:40  Phos  3.8     04-26  Mg     2.2     04-26    RADIOLOGY  CXR:  < from: Xray Chest 1 View-PORTABLE IMMEDIATE (04.25.19 @ 05:58) >  IMPRESSION: New left upper lobe infiltrate on top of extensive chronic   lung disease.    < end of copied text >      CT:  < from: CT Abdomen and Pelvis No Cont (04.24.19 @ 10:39) >  INTERPRETATION:  CLINICAL INFORMATION: Abdominal pain    COMPARISON: None.    PROCEDURE:   CT of the Abdomen and Pelvis was performed without intravenous contrast.   Intravenous contrast: None.  Oral contrast: None.  Sagittal and coronal reformats were performed.    FINDINGS:    LOWER CHEST: Tiny right pneumothorax. Basilar lung opacities. A few   scattered nodules measuring up to 6 mm.    Please note that evaluation of the abdominal organs and vascular   structures is limited by lack of intravenous contrast.    LIVER: Several indeterminate hepatic lesions measuring up to 2.5 cm.   Additional hypodense hepatic lesions measuring up to 2.7 cm may represent   cysts.  BILE DUCTS: Normal caliber.  GALLBLADDER: Within normal limits.  SPLEEN: Within normal limits.  PANCREAS: Within normal limits.  ADRENALS: Mild thickening.  KIDNEYS/URETERS: Within normal limits.    BLADDER: Within normal limits.  REPRODUCTIVE ORGANS: Calcified uterine fibroid.    BOWEL: No bowel obstruction. Appendix not visualized. Rectum markedly   distended by stool.  PERITONEUM: No ascites.  VESSELS:  Atherosclerotic calcifications.  RETROPERITONEUM: No lymphadenopathy.    ABDOMINAL WALL: Small fat-containing umbilical hernia.  BONES: Spinal degenerative changes. Indeterminate regions of sclerosis   within the right iliac bone.    IMPRESSION:     Tiny right pneumothorax.  Several indeterminate hepatic lesions measuring up to 2.5 cm. Contrast   enhanced study recommended for further assessment and characterization.  Marked rectal distention by stool.    Findings were discussed with RAYSA Vazquez by telephone on 4/24/2019 at 1054   hours.      < end of copied text >    ECHO:  Pending    VITALS:  T(C): 36.6 (04-26-19 @ 06:00), Max: 37.4 (04-25-19 @ 15:00)  T(F): 97.8 (04-26-19 @ 06:00), Max: 99.3 (04-25-19 @ 15:00)  HR: 98 (04-26-19 @ 09:12) (89 - 126)  BP: 98/55 (04-26-19 @ 08:00) (72/52 - 106/67)  BP(mean): 66 (04-26-19 @ 08:00) (58 - 78)  ABP: --  ABP(mean): --  RR: 18 (04-26-19 @ 08:00) (18 - 33)  SpO2: 100% (04-26-19 @ 09:12) (83% - 100%)  CVP(mm Hg): --  CVP(cm H2O): --    Ins and Outs     04-25-19 @ 07:01  -  04-26-19 @ 07:00  --------------------------------------------------------  IN: 1054.6 mL / OUT: 0 mL / NET: 1054.6 mL

## 2019-04-27 LAB
ANION GAP SERPL CALC-SCNC: 8 MMOL/L — SIGNIFICANT CHANGE UP (ref 5–17)
BUN SERPL-MCNC: 5 MG/DL — LOW (ref 7–23)
CALCIUM SERPL-MCNC: 8.9 MG/DL — SIGNIFICANT CHANGE UP (ref 8.4–10.5)
CHLORIDE SERPL-SCNC: 99 MMOL/L — SIGNIFICANT CHANGE UP (ref 96–108)
CO2 BLDA-SCNC: 30 MMOL/L — SIGNIFICANT CHANGE UP (ref 22–30)
CO2 SERPL-SCNC: 29 MMOL/L — SIGNIFICANT CHANGE UP (ref 22–31)
CREAT SERPL-MCNC: 0.68 MG/DL — SIGNIFICANT CHANGE UP (ref 0.5–1.3)
GLUCOSE SERPL-MCNC: 133 MG/DL — HIGH (ref 70–99)
HCT VFR BLD CALC: 35.4 % — SIGNIFICANT CHANGE UP (ref 34.5–45)
HGB BLD-MCNC: 12 G/DL — SIGNIFICANT CHANGE UP (ref 11.5–15.5)
HOROWITZ INDEX BLDA+IHG-RTO: SIGNIFICANT CHANGE UP
LACTATE SERPL-SCNC: 0.6 MMOL/L — LOW (ref 0.7–2)
MAGNESIUM SERPL-MCNC: 1.7 MG/DL — SIGNIFICANT CHANGE UP (ref 1.6–2.6)
MCHC RBC-ENTMCNC: 31.3 PG — SIGNIFICANT CHANGE UP (ref 27–34)
MCHC RBC-ENTMCNC: 33.8 GM/DL — SIGNIFICANT CHANGE UP (ref 32–36)
MCV RBC AUTO: 92.5 FL — SIGNIFICANT CHANGE UP (ref 80–100)
PCO2 BLDA: 40 MMHG — SIGNIFICANT CHANGE UP (ref 32–46)
PH BLDA: 7.47 — HIGH (ref 7.35–7.45)
PHOSPHATE SERPL-MCNC: 3.7 MG/DL — SIGNIFICANT CHANGE UP (ref 2.5–4.5)
PLATELET # BLD AUTO: 272 K/UL — SIGNIFICANT CHANGE UP (ref 150–400)
PO2 BLDA: 145 MMHG — HIGH (ref 74–108)
POTASSIUM SERPL-MCNC: 4.5 MMOL/L — SIGNIFICANT CHANGE UP (ref 3.5–5.3)
POTASSIUM SERPL-SCNC: 4.5 MMOL/L — SIGNIFICANT CHANGE UP (ref 3.5–5.3)
PROCALCITONIN SERPL-MCNC: 0.6 NG/ML — HIGH
RBC # BLD: 3.83 M/UL — SIGNIFICANT CHANGE UP (ref 3.8–5.2)
RBC # FLD: 14.1 % — SIGNIFICANT CHANGE UP (ref 10.3–14.5)
SAO2 % BLDA: >99 % — HIGH (ref 92–96)
SODIUM SERPL-SCNC: 136 MMOL/L — SIGNIFICANT CHANGE UP (ref 135–145)
WBC # BLD: 18.8 K/UL — HIGH (ref 3.8–10.5)
WBC # FLD AUTO: 18.8 K/UL — HIGH (ref 3.8–10.5)

## 2019-04-27 PROCEDURE — 71045 X-RAY EXAM CHEST 1 VIEW: CPT | Mod: 26,76

## 2019-04-27 PROCEDURE — 93970 EXTREMITY STUDY: CPT | Mod: 26

## 2019-04-27 RX ORDER — SODIUM CHLORIDE 9 MG/ML
1000 INJECTION, SOLUTION INTRAVENOUS ONCE
Qty: 0 | Refills: 0 | Status: COMPLETED | OUTPATIENT
Start: 2019-04-27 | End: 2019-04-27

## 2019-04-27 RX ORDER — VANCOMYCIN HCL 1 G
1000 VIAL (EA) INTRAVENOUS ONCE
Qty: 0 | Refills: 0 | Status: COMPLETED | OUTPATIENT
Start: 2019-04-27 | End: 2019-04-27

## 2019-04-27 RX ORDER — SODIUM CHLORIDE 9 MG/ML
10 INJECTION INTRAMUSCULAR; INTRAVENOUS; SUBCUTANEOUS
Qty: 0 | Refills: 0 | Status: DISCONTINUED | OUTPATIENT
Start: 2019-04-27 | End: 2019-05-09

## 2019-04-27 RX ORDER — IPRATROPIUM/ALBUTEROL SULFATE 18-103MCG
3 AEROSOL WITH ADAPTER (GRAM) INHALATION EVERY 6 HOURS
Qty: 0 | Refills: 0 | Status: DISCONTINUED | OUTPATIENT
Start: 2019-04-27 | End: 2019-05-04

## 2019-04-27 RX ORDER — MORPHINE SULFATE 50 MG/1
2 CAPSULE, EXTENDED RELEASE ORAL ONCE
Qty: 0 | Refills: 0 | Status: DISCONTINUED | OUTPATIENT
Start: 2019-04-27 | End: 2019-04-27

## 2019-04-27 RX ORDER — PIPERACILLIN AND TAZOBACTAM 4; .5 G/20ML; G/20ML
3.38 INJECTION, POWDER, LYOPHILIZED, FOR SOLUTION INTRAVENOUS EVERY 8 HOURS
Qty: 0 | Refills: 0 | Status: COMPLETED | OUTPATIENT
Start: 2019-04-27 | End: 2019-05-01

## 2019-04-27 RX ORDER — VANCOMYCIN HCL 1 G
VIAL (EA) INTRAVENOUS
Qty: 0 | Refills: 0 | Status: DISCONTINUED | OUTPATIENT
Start: 2019-04-27 | End: 2019-04-27

## 2019-04-27 RX ORDER — SODIUM CHLORIDE 9 MG/ML
1000 INJECTION, SOLUTION INTRAVENOUS
Qty: 0 | Refills: 0 | Status: DISCONTINUED | OUTPATIENT
Start: 2019-04-27 | End: 2019-05-01

## 2019-04-27 RX ORDER — SODIUM CHLORIDE 9 MG/ML
1000 INJECTION INTRAMUSCULAR; INTRAVENOUS; SUBCUTANEOUS ONCE
Qty: 0 | Refills: 0 | Status: DISCONTINUED | OUTPATIENT
Start: 2019-04-27 | End: 2019-04-27

## 2019-04-27 RX ORDER — MAGNESIUM SULFATE 500 MG/ML
1 VIAL (ML) INJECTION
Qty: 0 | Refills: 0 | Status: COMPLETED | OUTPATIENT
Start: 2019-04-27 | End: 2019-04-27

## 2019-04-27 RX ORDER — ALBUMIN HUMAN 25 %
100 VIAL (ML) INTRAVENOUS ONCE
Qty: 0 | Refills: 0 | Status: COMPLETED | OUTPATIENT
Start: 2019-04-27 | End: 2019-04-27

## 2019-04-27 RX ORDER — DILTIAZEM HCL 120 MG
10 CAPSULE, EXT RELEASE 24 HR ORAL ONCE
Qty: 0 | Refills: 0 | Status: COMPLETED | OUTPATIENT
Start: 2019-04-27 | End: 2019-04-27

## 2019-04-27 RX ORDER — HALOPERIDOL DECANOATE 100 MG/ML
2.5 INJECTION INTRAMUSCULAR ONCE
Qty: 0 | Refills: 0 | Status: DISCONTINUED | OUTPATIENT
Start: 2019-04-27 | End: 2019-05-09

## 2019-04-27 RX ORDER — HYDROCORTISONE 20 MG
50 TABLET ORAL EVERY 8 HOURS
Qty: 0 | Refills: 0 | Status: DISCONTINUED | OUTPATIENT
Start: 2019-04-27 | End: 2019-04-28

## 2019-04-27 RX ORDER — CHLORHEXIDINE GLUCONATE 213 G/1000ML
1 SOLUTION TOPICAL EVERY 12 HOURS
Qty: 0 | Refills: 0 | Status: DISCONTINUED | OUTPATIENT
Start: 2019-04-27 | End: 2019-05-09

## 2019-04-27 RX ORDER — VANCOMYCIN HCL 1 G
1000 VIAL (EA) INTRAVENOUS EVERY 12 HOURS
Qty: 0 | Refills: 0 | Status: COMPLETED | OUTPATIENT
Start: 2019-04-27 | End: 2019-05-01

## 2019-04-27 RX ORDER — ALBUMIN HUMAN 25 %
50 VIAL (ML) INTRAVENOUS EVERY 6 HOURS
Qty: 0 | Refills: 0 | Status: DISCONTINUED | OUTPATIENT
Start: 2019-04-27 | End: 2019-04-28

## 2019-04-27 RX ADMIN — Medication 27.5 MILLIGRAM(S): at 05:23

## 2019-04-27 RX ADMIN — Medication 200 MILLIGRAM(S): at 04:11

## 2019-04-27 RX ADMIN — LIDOCAINE 1 PATCH: 4 CREAM TOPICAL at 05:21

## 2019-04-27 RX ADMIN — Medication 27.5 MILLIGRAM(S): at 17:22

## 2019-04-27 RX ADMIN — Medication 1 MILLIGRAM(S): at 00:11

## 2019-04-27 RX ADMIN — Medication 0.5 MILLIGRAM(S): at 21:16

## 2019-04-27 RX ADMIN — Medication 3 MILLILITER(S): at 21:16

## 2019-04-27 RX ADMIN — Medication 250 MILLIGRAM(S): at 19:51

## 2019-04-27 RX ADMIN — SODIUM CHLORIDE 1000 MILLILITER(S): 9 INJECTION, SOLUTION INTRAVENOUS at 15:49

## 2019-04-27 RX ADMIN — MORPHINE SULFATE 2 MILLIGRAM(S): 50 CAPSULE, EXTENDED RELEASE ORAL at 05:18

## 2019-04-27 RX ADMIN — MIDODRINE HYDROCHLORIDE 10 MILLIGRAM(S): 2.5 TABLET ORAL at 15:50

## 2019-04-27 RX ADMIN — SODIUM CHLORIDE 1000 MILLILITER(S): 9 INJECTION, SOLUTION INTRAVENOUS at 21:00

## 2019-04-27 RX ADMIN — SODIUM CHLORIDE 2000 MILLILITER(S): 9 INJECTION, SOLUTION INTRAVENOUS at 15:49

## 2019-04-27 RX ADMIN — Medication 250 MILLIGRAM(S): at 09:37

## 2019-04-27 RX ADMIN — Medication 3 MILLILITER(S): at 19:01

## 2019-04-27 RX ADMIN — Medication 3 MILLILITER(S): at 08:38

## 2019-04-27 RX ADMIN — Medication 50 MILLILITER(S): at 23:57

## 2019-04-27 RX ADMIN — PIPERACILLIN AND TAZOBACTAM 25 GRAM(S): 4; .5 INJECTION, POWDER, LYOPHILIZED, FOR SOLUTION INTRAVENOUS at 09:37

## 2019-04-27 RX ADMIN — Medication 10 MILLIGRAM(S): at 04:37

## 2019-04-27 RX ADMIN — Medication 100 GRAM(S): at 09:36

## 2019-04-27 RX ADMIN — Medication 100 MILLIGRAM(S): at 19:51

## 2019-04-27 RX ADMIN — PIPERACILLIN AND TAZOBACTAM 25 GRAM(S): 4; .5 INJECTION, POWDER, LYOPHILIZED, FOR SOLUTION INTRAVENOUS at 21:46

## 2019-04-27 RX ADMIN — Medication 100 GRAM(S): at 10:36

## 2019-04-27 RX ADMIN — LIDOCAINE 1 PATCH: 4 CREAM TOPICAL at 17:22

## 2019-04-27 RX ADMIN — CHLORHEXIDINE GLUCONATE 1 APPLICATION(S): 213 SOLUTION TOPICAL at 17:23

## 2019-04-27 RX ADMIN — MORPHINE SULFATE 2 MILLIGRAM(S): 50 CAPSULE, EXTENDED RELEASE ORAL at 05:33

## 2019-04-27 RX ADMIN — Medication 50 MILLILITER(S): at 10:36

## 2019-04-27 RX ADMIN — Medication 0.5 MILLIGRAM(S): at 08:37

## 2019-04-27 RX ADMIN — MIDODRINE HYDROCHLORIDE 10 MILLIGRAM(S): 2.5 TABLET ORAL at 21:46

## 2019-04-27 RX ADMIN — PANTOPRAZOLE SODIUM 40 MILLIGRAM(S): 20 TABLET, DELAYED RELEASE ORAL at 05:32

## 2019-04-27 RX ADMIN — Medication 50 MILLIGRAM(S): at 21:46

## 2019-04-27 RX ADMIN — PANTOPRAZOLE SODIUM 40 MILLIGRAM(S): 20 TABLET, DELAYED RELEASE ORAL at 17:21

## 2019-04-27 RX ADMIN — Medication 50 MILLIGRAM(S): at 15:50

## 2019-04-27 RX ADMIN — Medication 50 MILLILITER(S): at 17:21

## 2019-04-27 RX ADMIN — PIPERACILLIN AND TAZOBACTAM 25 GRAM(S): 4; .5 INJECTION, POWDER, LYOPHILIZED, FOR SOLUTION INTRAVENOUS at 15:49

## 2019-04-27 RX ADMIN — Medication 50 MILLILITER(S): at 12:43

## 2019-04-27 RX ADMIN — LIDOCAINE 1 PATCH: 4 CREAM TOPICAL at 07:30

## 2019-04-27 NOTE — PROCEDURE NOTE - NSINDICATIONS_GEN_A_CORE
critical illness/hemodynamic monitoring
blood sampling
critical illness/hemodynamic monitoring/emergency venous access

## 2019-04-27 NOTE — PROGRESS NOTE ADULT - ASSESSMENT
IMPRESSION : Lower GI bleeding resolved, presently    PLAN: No surgical problems evident at this time

## 2019-04-27 NOTE — PROCEDURE NOTE - NSPROCDETAILS_GEN_ALL_CORE
guidewire recovered/lumen(s) aspirated and flushed/sterile dressing applied/sterile technique, catheter placed
positive blood return obtained via catheter/hemostasis with direct pressure, dressing applied/all materials/supplies accounted for at end of procedure/location identified, draped/prepped, sterile technique used, needle inserted/introduced

## 2019-04-27 NOTE — PROGRESS NOTE ADULT - ASSESSMENT
Physical Examination:  GENERAL:               Lethargic , Oriented, No acute distress.    HEENT:                    No JVD, dry MM  PULM:                     Bilateral air entry, Clear to auscultation bilaterally, no significant sputum production, trace Rales, No Rhonchi, No Wheezing  CVS:                         S1, S2,  +Murmur  ABD:                        Soft, nondistended, nontender, normoactive bowel sounds,   EXT:                         No edema, nontender, No Cyanosis or Clubbing   Vascular:                Warm Extremities, Normal Capillary refill,   NEURO:                  lethargic, oriented, interactive,  follows commands  PSYC:                      Calm, + Insight.    Assessment:  59 yr old female PMHx of MVP, chronic SDH, interstitial lung disease/pneumothorax, pulmonary nodules, meniere's, non hodgkins lymphoma (SCD/XRT/chemo at [facility] [place] 2003), TIA, tachycardia, occipital neuralgia, and neuropathy being admitted to the ICU for acute GI bleeding with hypotension.       1. Hypovolemic shock  possible Septic component present now  2. Left HCAP vs Aspiration PNA, -  3. Acute blood loss anemia   4. Gastrointestinal hemorrhage - No more episodes of bloody bowel movement.   5. CVA - Left upper extremity spasm/paresis. Holding aspirin for now given acute bleeding with shock   6. Seizures - Cont. IV meds  7. Chronic Intersitial lung disease -   8. Anxiety -  9. Sinus tachycardia New    Plan  Patient remains on pressors, remains hypotensive,   Start Abx,   Midodrine, Albumin, IVF, Try to taper Pressers  will need eventual colonoscopy when off pressors.   Monitor CXR in am.   GI f/u  Sinus tach - check LE Duplex, Check EKG  SCD for DVT prophylaxis.   continue xanax. Monitor for signs of benzo withdrawal.  will get speech and swallow eval when pt more alert.     Sedation & Analgesia:	n/a  Diet/Nutrition:		Advance as tolerated/oral  GI PPx:			PPI    DVT Ppx:		Venodynes	due to active bleeding       Activity:		               Bedrest, Active ROM, risk of fall if out of bed  Head of Bed:               35-45 deg  Glycemic Control:        n/a    Lines: PIV  Restraints were deemed necessary to prevent removal of life-sustaining devices [  ] YES   [    x]  NO    Disposition: ICU care    Goals of Care: Full Physical Examination:  GENERAL:               Lethargic , Oriented, No acute distress.    HEENT:                    No JVD, dry MM  PULM:                     Bilateral air entry, Clear to auscultation bilaterally, no significant sputum production, trace Rales, No Rhonchi, No Wheezing  CVS:                         S1, S2,  +Murmur  ABD:                        Soft, nondistended, nontender, normoactive bowel sounds,   EXT:                         No edema, nontender, No Cyanosis or Clubbing   Vascular:                Warm Extremities, Normal Capillary refill,   NEURO:                  lethargic, oriented, interactive,  follows commands  PSYC:                      Calm, + Insight.    Assessment:  59 yr old female PMHx of MVP, chronic SDH, interstitial lung disease/pneumothorax, pulmonary nodules, meniere's, non hodgkins lymphoma (SCD/XRT/chemo at [facility] [place] 2003), TIA, tachycardia, occipital neuralgia, and neuropathy being admitted to the ICU for acute GI bleeding with hypotension.       1. Hypovolemic shock  possible Septic component present now  2. Left HCAP vs Aspiration PNA, -  3. Acute blood loss anemia   4. Gastrointestinal hemorrhage - No more episodes of bloody bowel movement.   5. CVA - Left upper extremity spasm/paresis. Holding aspirin for now given acute bleeding with shock   6. Seizures - Cont. IV meds  7. Chronic Intersitial lung disease -   8. Anxiety -  9. Sinus tachycardia New    Plan  Patient remains on pressors, remains hypotensive,   Start Abx, Start empiric steroids for ? Adrenal Insuf.   Midodrine, Albumin, IVF, Try to taper Pressers  will need eventual colonoscopy when off pressors.   Monitor CXR in am.   GI f/u  Sinus tach - check LE Duplex, Check EKG  SCD for DVT prophylaxis.   continue xanax. Monitor for signs of benzo withdrawal.  will get speech and swallow eval when pt more alert.     Sedation & Analgesia:	n/a  Diet/Nutrition:		Advance as tolerated/oral  GI PPx:			PPI    DVT Ppx:		Venodynes	due to active bleeding       Activity:		               Bedrest, Active ROM, risk of fall if out of bed  Head of Bed:               35-45 deg  Glycemic Control:        n/a    Lines: PIV  Restraints were deemed necessary to prevent removal of life-sustaining devices [  ] YES   [    x]  NO    Disposition: ICU care    Goals of Care: Full

## 2019-04-27 NOTE — PROCEDURE NOTE - NSINFORMCONSENT_GEN_A_CORE
Benefits, risks, and possible complications of procedure explained to patient/caregiver who verbalized understanding and gave written consent.
This was an emergent procedure.
Benefits, risks, and possible complications of procedure explained to patient/caregiver who verbalized understanding and gave written consent.

## 2019-04-27 NOTE — PROCEDURE NOTE - NSSITEPREP_SKIN_A_CORE
alcohol/Adherence to aseptic technique: hand hygiene prior to donning barriers (gown, gloves), don cap and mask, sterile drape over patient

## 2019-04-27 NOTE — PROGRESS NOTE ADULT - SUBJECTIVE AND OBJECTIVE BOX
There has not been any GI bleeding for the past three days.  H/H and VS are stable    PFSH: All noncontributory    MEDICATIONS REVIEWED:acetaminophen   Tablet .. 650 milliGRAM(s) Oral every 6 hours PRN  albumin human 25% IVPB 50 milliLiter(s) IV Intermittent every 6 hours  ALBUTerol/ipratropium for Nebulization 3 milliLiter(s) Nebulizer every 6 hours  ALBUTerol/ipratropium for Nebulization 3 milliLiter(s) Nebulizer every 6 hours PRN  ALPRAZolam 0.5 milliGRAM(s) Oral three times a day  buDESOnide   0.5 milliGRAM(s) Respule 0.5 milliGRAM(s) Inhalation two times a day  chlorhexidine 4% Liquid 1 Application(s) Topical every 12 hours  haloperidol    Injectable 2.5 milliGRAM(s) IntraMuscular once PRN  hydrocortisone sodium succinate Injectable 50 milliGRAM(s) IV Push every 8 hours  lactated ringers Bolus 1000 milliLiter(s) IV Bolus once  lactated ringers. 1000 milliLiter(s) IV Continuous <Continuous>  lidocaine   Patch 1 Patch Transdermal daily  LORazepam   Injectable 1 milliGRAM(s) IV Push four times a day PRN  midodrine 10 milliGRAM(s) Oral three times a day  ondansetron Injectable 4 milliGRAM(s) IV Push every 6 hours PRN  pantoprazole  Injectable 40 milliGRAM(s) IV Push every 12 hours  phenylephrine    Infusion 0.2 MICROgram(s)/kG/Min IV Continuous <Continuous>  piperacillin/tazobactam IVPB. 3.375 Gram(s) IV Intermittent every 8 hours  tiotropium 18 MICROgram(s) Capsule 1 Capsule(s) Inhalation daily  valproate sodium IVPB 500 milliGRAM(s) IV Intermittent every 12 hours  vancomycin  IVPB      zinc oxide 20% Ointment 1 Application(s) Topical daily      ALLERGIES:IV Contrast (Anaphylaxis)  shellfish. (Anaphylaxis)      REVIEW OF SYSTEMS:  Comprehensive Review of Systems negative except as noted in HPI      PHYSICAL EXAMINATION:  RESPIRATORY: Clear to auscultation bilaterally, respirations non-labored.  CARDIAC: RR, normal S1S2, no murmurs.  ABDOMEN: soft, BS present, no masses, no hernias, no peritoneal signs, no KLS, nontender.  VASCULAR: Equal and normal pulses.  MUSCULOSKELETAL: Full ROM, no deformities.      T(C): 37.2 (04-27-19 @ 05:00), Max: 37.2 (04-27-19 @ 05:00)  HR: 91 (04-27-19 @ 11:00) (63 - 161)  BP: 87/53 (04-27-19 @ 11:00) (65/44 - 118/76)  RR: 25 (04-27-19 @ 11:00) (19 - 53)  SpO2: 100% (04-27-19 @ 11:00) (90% - 100%)                          12.0   18.8  )-----------( 272      ( 27 Apr 2019 05:08 )             35.4       04-27    136  |  99  |  5<L>  ----------------------------<  133<H>  4.5   |  29  |  0.68    Ca    8.9      27 Apr 2019 05:08  Phos  3.7     04-27  Mg     1.7     04-27

## 2019-04-27 NOTE — CONSULT NOTE ADULT - SUBJECTIVE AND OBJECTIVE BOX
HPI:   Patient is a 59y female with remote lymphoma, pulm nodules, interstitial lung dz, recent hosp's, chronic SDH, prolonged course for neuro decompensation- initially at [facility], then [facility] with new onset Sz , and as of 3/19. sent to [facility].  B/l SDH, cerebral ischemia, otherwise negative w/u (CSF, EEG).  PET with activity base of tongue.  She was sent here for Rehab 4/19; transferred to acute care 4/23 for bloody BMs, GIB- in ICU, required pressors.  Pressors had to be continued despite adequate volume resuscitation (4 Units PRBCs; EGD 4/24 no obvious source of bleeding), raising concern for sepsis as well.  Vanco and Zosyn started earlier today.  She is now off pressors; somnolent, but arouses easily, follows simple commands, otherwise a poor informant.  No recent vomiting.    REVIEW OF SYSTEMS:  All other review of systems negative (Comprehensive ROS)- limited    PAST MEDICAL & SURGICAL HISTORY:  Interstitial lung disease: on home o2 prn  NHL (non-Hodgkin's lymphoma): Agem 45 sp chemo/rt/stem cell  Transient cerebral ischemia, unspecified type  Mitral prolapse  History of tonsillectomy  History of appendectomy    Allergies  IV Contrast (Anaphylaxis)  shellfish. (Anaphylaxis)    Antimicrobials Day # 1  piperacillin/tazobactam IVPB. 3.375 Gram(s) IV Intermittent every 8 hours  vancomycin  IVPB        Other Medications:  acetaminophen   Tablet .. 650 milliGRAM(s) Oral every 6 hours PRN  albumin human 25% IVPB 50 milliLiter(s) IV Intermittent every 6 hours  ALBUTerol/ipratropium for Nebulization 3 milliLiter(s) Nebulizer every 6 hours  ALBUTerol/ipratropium for Nebulization 3 milliLiter(s) Nebulizer every 6 hours PRN  ALPRAZolam 0.5 milliGRAM(s) Oral three times a day  buDESOnide   0.5 milliGRAM(s) Respule 0.5 milliGRAM(s) Inhalation two times a day  chlorhexidine 4% Liquid 1 Application(s) Topical every 12 hours  haloperidol    Injectable 2.5 milliGRAM(s) IntraMuscular once PRN  hydrocortisone sodium succinate Injectable 50 milliGRAM(s) IV Push every 8 hours  lactated ringers. 1000 milliLiter(s) IV Continuous <Continuous>  lidocaine   Patch 1 Patch Transdermal daily  LORazepam   Injectable 1 milliGRAM(s) IV Push four times a day PRN  midodrine 10 milliGRAM(s) Oral three times a day  ondansetron Injectable 4 milliGRAM(s) IV Push every 6 hours PRN  pantoprazole  Injectable 40 milliGRAM(s) IV Push every 12 hours  phenylephrine    Infusion 0.2 MICROgram(s)/kG/Min IV Continuous <Continuous>  sodium chloride 0.9% lock flush 10 milliLiter(s) IV Push every 1 hour PRN  tiotropium 18 MICROgram(s) Capsule 1 Capsule(s) Inhalation daily  valproate sodium IVPB 500 milliGRAM(s) IV Intermittent every 12 hours  zinc oxide 20% Ointment 1 Application(s) Topical daily      FAMILY HISTORY:  Family history of stroke  Family history of breast cancer: Mother      SOCIAL HISTORY:  Smoking: former    ETOH: no         T(F): 99 (04-27-19 @ 05:00), Max: 99 (04-27-19 @ 05:00)  HR: 129 (04-27-19 @ 15:00)  BP: 88/57 (04-27-19 @ 15:00)  RR: 24 (04-27-19 @ 15:00)  SpO2: 100% (04-27-19 @ 15:00)  Wt(kg): --    PHYSICAL EXAM:  General: no acute distress  Eyes:  anicteric, no conjunctival injection, no discharge  Oropharynx: no lesions or injection 	  Neck: supple, without adenopathy  Lungs: poor insp effort, coarse ant on R  Heart: regular rate and rhythm; no murmur, rubs or gallops  Abdomen: soft, nondistended, nontender, without mass or organomegaly  Skin: no lesions  Extremities: no edema  Neurologic: somnolent, moves all extremities    LAB RESULTS:                        12.0   18.8  )-----------( 272      ( 27 Apr 2019 05:08 )             35.4     04-27    136  |  99  |  5<L>  ----------------------------<  133<H>  4.5   |  29  |  0.68    Ca    8.9      27 Apr 2019 05:08  Phos  3.7     04-27  Mg     1.7     04-27    MICROBIOLOGY:  RECENT CULTURES:  Pending    RADIOLOGY REVIEWED:  Xray Chest 1 View-PORTABLE IMMEDIATE (04.27.19 @ 15:18) >  New right IJ central venous catheter terminates at the lower   right atrium.  Bilateral pulmonary densities, grossly stable on the right and slightly   improved on the left.   No gross pleural effusion or pneumothorax.  Stable left apical pleural-parenchymal scarring.  Stable cardiac silhouette.    CT Abdomen and Pelvis No Cont (04.24.19 @ 10:39) >  Tiny right pneumothorax.  Several indeterminate hepatic lesions measuring up to 2.5 cm. Contrast   enhanced study recommended for further assessment and characterization.  Marked rectal distention by stool.

## 2019-04-27 NOTE — PROGRESS NOTE ADULT - SUBJECTIVE AND OBJECTIVE BOX
Follow-up Critical Care Progress Note  Chief Complaint : Other ulcerative colitis with rectal bleeding    overnight had difficult night increased cough increased agitation and tachycardia   received morphine, ativan overnight   this AM feels drowsy  c/o cough   d/w  Bedside.     Allergies :IV Contrast (Anaphylaxis)  shellfish. (Anaphylaxis)      PAST MEDICAL & SURGICAL HISTORY:  Interstitial lung disease: on home o2 prn  NHL (non-Hodgkin's lymphoma): Agem 43 sp chemo/rt/stem cell  Transient cerebral ischemia, unspecified type  Mitral prolapse  History of tonsillectomy  History of appendectomy      Medications:  MEDICATIONS  (STANDING):  ALBUTerol/ipratropium for Nebulization 3 milliLiter(s) Nebulizer every 6 hours  ALPRAZolam 0.5 milliGRAM(s) Oral three times a day  buDESOnide   0.5 milliGRAM(s) Respule 0.5 milliGRAM(s) Inhalation two times a day  lactated ringers. 1000 milliLiter(s) (75 mL/Hr) IV Continuous <Continuous>  lidocaine   Patch 1 Patch Transdermal daily  magnesium sulfate  IVPB 1 Gram(s) IV Intermittent every 1 hour  midodrine 10 milliGRAM(s) Oral three times a day  pantoprazole  Injectable 40 milliGRAM(s) IV Push every 12 hours  phenylephrine    Infusion 0.2 MICROgram(s)/kG/Min (2.167 mL/Hr) IV Continuous <Continuous>  piperacillin/tazobactam IVPB. 3.375 Gram(s) IV Intermittent every 8 hours  tiotropium 18 MICROgram(s) Capsule 1 Capsule(s) Inhalation daily  valproate sodium IVPB 500 milliGRAM(s) IV Intermittent every 12 hours  vancomycin  IVPB      vancomycin  IVPB 1000 milliGRAM(s) IV Intermittent once  zinc oxide 20% Ointment 1 Application(s) Topical daily    MEDICATIONS  (PRN):  acetaminophen   Tablet .. 650 milliGRAM(s) Oral every 6 hours PRN Temp greater or equal to 38C (100.4F), Mild Pain (1 - 3)  ALBUTerol/ipratropium for Nebulization 3 milliLiter(s) Nebulizer every 6 hours PRN Shortness of Breath and/or Wheezing  haloperidol    Injectable 2.5 milliGRAM(s) IntraMuscular once PRN agitation  LORazepam   Injectable 1 milliGRAM(s) IV Push four times a day PRN Benzo Withdrawal  ondansetron Injectable 4 milliGRAM(s) IV Push every 6 hours PRN Nausea and/or Vomiting      LABS:                        12.0   18.8  )-----------( 272      ( 27 Apr 2019 05:08 )             35.4     04-27    136  |  99  |  5<L>  ----------------------------<  133<H>  4.5   |  29  |  0.68    Ca    8.9      27 Apr 2019 05:08  Phos  3.7     04-27  Mg     1.7     04-27        CULTURES: (if applicable)          CAPILLARY BLOOD GLUCOSE          RADIOLOGY  CXR:  CXR - Left sided pneumonia.    < from: Xray Chest 1 View- PORTABLE-Routine (04.27.19 @ 06:35) >  Indication: CHF.    Portable chest x-ray is compared to a previous examination dated   4/25/2019.    Impression: Patchy densities in both lungs, slightly increased since the   previous examination, suggestive of progression of pulmonary edema or   pneumonia. Clinical correlation is recommended.    Stable left apical pleural-parenchymal scarring.    Possible small pleural effusionat the left costophrenic angle.    No evidence for pneumothorax.    Stable cardiac silhouette.    < end of copied text >      < from: TTE Echo Complete w/Doppler (04.26.19 @ 11:26) >  Summary:   1. Left ventricular ejection fraction, by visual estimation, is 55%.   2. Normal global left ventricular systolic function.   3. Spectral Doppler shows impaired relaxation pattern of left ventricular myocardial filling (Grade I diastolic dysfunction).   4. There is mild concentric left ventricular hypertrophy.   5. Myxomatous mitral valve.   6. Moderate-severe tricuspid regurgitation.   7. Estimatedpulmonary artery systolic pressure is 50.4 mmHg assuming a right atrial pressure of 10 mmHg, which is consistent with moderate pulmonary hypertension.   8. Pulmonary hypertension is present.   9. LA volume Index is 21.6 ml/m² ml/m2.    < end of copied text >      VITALS:  T(C): 37.2 (04-27-19 @ 05:00), Max: 37.2 (04-27-19 @ 05:00)  T(F): 99 (04-27-19 @ 05:00), Max: 99 (04-27-19 @ 05:00)  HR: 124 (04-27-19 @ 08:40) (63 - 161)  BP: 92/59 (04-27-19 @ 06:00) (64/54 - 118/76)  BP(mean): 70 (04-27-19 @ 06:00) (60 - 96)  ABP: --  ABP(mean): --  RR: 28 (04-27-19 @ 06:00) (19 - 53)  SpO2: 100% (04-27-19 @ 08:40) (89% - 100%)  CVP(mm Hg): --  CVP(cm H2O): --

## 2019-04-27 NOTE — PROCEDURE NOTE - ADDITIONAL PROCEDURE DETAILS
attempt made to place Left IJ, noted IJ easily comprisable, Able to cannulate and pass guidewire, and shortly after patient states drape cant make her breath, sterile field broken and procedure stopped.   at this time patient/family do not want to attempt central line again.
Time spent on procedure independent of Critical Care time spent at the bedside  Indication for procedure: GI Bleed, Tachycardia  Dx Code: K92.2, R00.0
after pt iv access remains problematic and extensive discussion with pt and family, pt agreed to attempt again now done on right.

## 2019-04-27 NOTE — PROCEDURE NOTE - NSPOSTPRCRAD_GEN_A_CORE
no pneumothorax/central line located in the/central line located in the superior vena cava/depth of insertion/post-procedure radiography performed

## 2019-04-27 NOTE — CHART NOTE - NSCHARTNOTEFT_GEN_A_CORE
-called to bedside as patient noted to be extremely agitated, with HR in the 160-170's, sinus    -on arrival noted patient to be in discomfort complaining of diffuse pain (left shoulder, bilateral legs)    dose of cardizme did not effect HR, dose of morphine 2mg IVP helped to calm patient, and now HR in low 1 teens (110-113)    -unclear origin of tachycardia. She is not hypoxic at this time, and has had no further GI bleeding during this shift. She is also afebrile    -plan will be to help control pain and agitation, will send AM labs now to ensure there is no drop in Hb leading to tachycardia despite lack of visibile bleeding, and will order lower extremity doppler to rule out DVT

## 2019-04-28 LAB
ANION GAP SERPL CALC-SCNC: 6 MMOL/L — SIGNIFICANT CHANGE UP (ref 5–17)
BLD GP AB SCN SERPL QL: SIGNIFICANT CHANGE UP
BUN SERPL-MCNC: 6 MG/DL — LOW (ref 7–23)
CALCIUM SERPL-MCNC: 9 MG/DL — SIGNIFICANT CHANGE UP (ref 8.4–10.5)
CHLORIDE SERPL-SCNC: 105 MMOL/L — SIGNIFICANT CHANGE UP (ref 96–108)
CO2 SERPL-SCNC: 34 MMOL/L — HIGH (ref 22–31)
CREAT SERPL-MCNC: 0.56 MG/DL — SIGNIFICANT CHANGE UP (ref 0.5–1.3)
GLUCOSE SERPL-MCNC: 124 MG/DL — HIGH (ref 70–99)
HCT VFR BLD CALC: 24.8 % — LOW (ref 34.5–45)
HCT VFR BLD CALC: 25 % — LOW (ref 34.5–45)
HCT VFR BLD CALC: 25.9 % — LOW (ref 34.5–45)
HGB BLD-MCNC: 8.1 G/DL — LOW (ref 11.5–15.5)
HGB BLD-MCNC: 8.2 G/DL — LOW (ref 11.5–15.5)
HGB BLD-MCNC: 8.4 G/DL — LOW (ref 11.5–15.5)
MAGNESIUM SERPL-MCNC: 2 MG/DL — SIGNIFICANT CHANGE UP (ref 1.6–2.6)
MCHC RBC-ENTMCNC: 30.4 PG — SIGNIFICANT CHANGE UP (ref 27–34)
MCHC RBC-ENTMCNC: 30.5 PG — SIGNIFICANT CHANGE UP (ref 27–34)
MCHC RBC-ENTMCNC: 30.5 PG — SIGNIFICANT CHANGE UP (ref 27–34)
MCHC RBC-ENTMCNC: 32.5 GM/DL — SIGNIFICANT CHANGE UP (ref 32–36)
MCHC RBC-ENTMCNC: 32.6 GM/DL — SIGNIFICANT CHANGE UP (ref 32–36)
MCHC RBC-ENTMCNC: 32.7 GM/DL — SIGNIFICANT CHANGE UP (ref 32–36)
MCV RBC AUTO: 93.2 FL — SIGNIFICANT CHANGE UP (ref 80–100)
MCV RBC AUTO: 93.3 FL — SIGNIFICANT CHANGE UP (ref 80–100)
MCV RBC AUTO: 93.8 FL — SIGNIFICANT CHANGE UP (ref 80–100)
PHOSPHATE SERPL-MCNC: 4.5 MG/DL — SIGNIFICANT CHANGE UP (ref 2.5–4.5)
PLATELET # BLD AUTO: 180 K/UL — SIGNIFICANT CHANGE UP (ref 150–400)
PLATELET # BLD AUTO: 193 K/UL — SIGNIFICANT CHANGE UP (ref 150–400)
PLATELET # BLD AUTO: 193 K/UL — SIGNIFICANT CHANGE UP (ref 150–400)
POTASSIUM SERPL-MCNC: 3.5 MMOL/L — SIGNIFICANT CHANGE UP (ref 3.5–5.3)
POTASSIUM SERPL-SCNC: 3.5 MMOL/L — SIGNIFICANT CHANGE UP (ref 3.5–5.3)
RBC # BLD: 2.64 M/UL — LOW (ref 3.8–5.2)
RBC # BLD: 2.68 M/UL — LOW (ref 3.8–5.2)
RBC # BLD: 2.77 M/UL — LOW (ref 3.8–5.2)
RBC # FLD: 14.4 % — SIGNIFICANT CHANGE UP (ref 10.3–14.5)
RBC # FLD: 14.7 % — HIGH (ref 10.3–14.5)
RBC # FLD: 14.9 % — HIGH (ref 10.3–14.5)
SODIUM SERPL-SCNC: 145 MMOL/L — SIGNIFICANT CHANGE UP (ref 135–145)
TSH SERPL-MCNC: 0.55 UIU/ML — SIGNIFICANT CHANGE UP (ref 0.27–4.2)
WBC # BLD: 10.1 K/UL — SIGNIFICANT CHANGE UP (ref 3.8–10.5)
WBC # BLD: 10.8 K/UL — HIGH (ref 3.8–10.5)
WBC # BLD: 9.8 K/UL — SIGNIFICANT CHANGE UP (ref 3.8–10.5)
WBC # FLD AUTO: 10.1 K/UL — SIGNIFICANT CHANGE UP (ref 3.8–10.5)
WBC # FLD AUTO: 10.8 K/UL — HIGH (ref 3.8–10.5)
WBC # FLD AUTO: 9.8 K/UL — SIGNIFICANT CHANGE UP (ref 3.8–10.5)

## 2019-04-28 PROCEDURE — 71045 X-RAY EXAM CHEST 1 VIEW: CPT | Mod: 26

## 2019-04-28 PROCEDURE — 93010 ELECTROCARDIOGRAM REPORT: CPT

## 2019-04-28 RX ORDER — HYDROCORTISONE 20 MG
50 TABLET ORAL DAILY
Qty: 0 | Refills: 0 | Status: COMPLETED | OUTPATIENT
Start: 2019-04-29 | End: 2019-04-30

## 2019-04-28 RX ORDER — POTASSIUM CHLORIDE 20 MEQ
40 PACKET (EA) ORAL ONCE
Qty: 0 | Refills: 0 | Status: COMPLETED | OUTPATIENT
Start: 2019-04-28 | End: 2019-04-28

## 2019-04-28 RX ORDER — HYDROCORTISONE 20 MG
25 TABLET ORAL DAILY
Qty: 0 | Refills: 0 | Status: COMPLETED | OUTPATIENT
Start: 2019-05-01 | End: 2019-05-01

## 2019-04-28 RX ORDER — MIDODRINE HYDROCHLORIDE 2.5 MG/1
10 TABLET ORAL THREE TIMES A DAY
Qty: 0 | Refills: 0 | Status: DISCONTINUED | OUTPATIENT
Start: 2019-04-28 | End: 2019-04-29

## 2019-04-28 RX ORDER — HYDROCORTISONE 20 MG
TABLET ORAL
Qty: 0 | Refills: 0 | Status: COMPLETED | OUTPATIENT
Start: 2019-04-28 | End: 2019-05-01

## 2019-04-28 RX ORDER — MULTIVIT-MIN/FERROUS GLUCONATE 9 MG/15 ML
1 LIQUID (ML) ORAL DAILY
Qty: 0 | Refills: 0 | Status: DISCONTINUED | OUTPATIENT
Start: 2019-04-26 | End: 2019-04-28

## 2019-04-28 RX ORDER — HYDROCORTISONE 20 MG
50 TABLET ORAL EVERY 12 HOURS
Qty: 0 | Refills: 0 | Status: COMPLETED | OUTPATIENT
Start: 2019-04-28 | End: 2019-04-29

## 2019-04-28 RX ADMIN — Medication 100 MILLIGRAM(S): at 20:21

## 2019-04-28 RX ADMIN — PIPERACILLIN AND TAZOBACTAM 25 GRAM(S): 4; .5 INJECTION, POWDER, LYOPHILIZED, FOR SOLUTION INTRAVENOUS at 13:19

## 2019-04-28 RX ADMIN — Medication 3 MILLILITER(S): at 21:11

## 2019-04-28 RX ADMIN — Medication 27.5 MILLIGRAM(S): at 05:42

## 2019-04-28 RX ADMIN — CHLORHEXIDINE GLUCONATE 1 APPLICATION(S): 213 SOLUTION TOPICAL at 05:42

## 2019-04-28 RX ADMIN — PANTOPRAZOLE SODIUM 40 MILLIGRAM(S): 20 TABLET, DELAYED RELEASE ORAL at 18:02

## 2019-04-28 RX ADMIN — Medication 250 MILLIGRAM(S): at 06:13

## 2019-04-28 RX ADMIN — Medication 27.5 MILLIGRAM(S): at 18:02

## 2019-04-28 RX ADMIN — Medication 0.5 MILLIGRAM(S): at 09:10

## 2019-04-28 RX ADMIN — Medication 100 MILLIGRAM(S): at 02:01

## 2019-04-28 RX ADMIN — Medication 650 MILLIGRAM(S): at 15:45

## 2019-04-28 RX ADMIN — Medication 250 MILLIGRAM(S): at 18:02

## 2019-04-28 RX ADMIN — Medication 0.5 MILLIGRAM(S): at 21:11

## 2019-04-28 RX ADMIN — CHLORHEXIDINE GLUCONATE 1 APPLICATION(S): 213 SOLUTION TOPICAL at 18:03

## 2019-04-28 RX ADMIN — ZINC OXIDE 1 APPLICATION(S): 200 OINTMENT TOPICAL at 12:07

## 2019-04-28 RX ADMIN — Medication 3 MILLILITER(S): at 09:10

## 2019-04-28 RX ADMIN — Medication 40 MILLIEQUIVALENT(S): at 06:40

## 2019-04-28 RX ADMIN — ONDANSETRON 4 MILLIGRAM(S): 8 TABLET, FILM COATED ORAL at 12:04

## 2019-04-28 RX ADMIN — MIDODRINE HYDROCHLORIDE 10 MILLIGRAM(S): 2.5 TABLET ORAL at 13:19

## 2019-04-28 RX ADMIN — SODIUM CHLORIDE 125 MILLILITER(S): 9 INJECTION, SOLUTION INTRAVENOUS at 10:44

## 2019-04-28 RX ADMIN — PANTOPRAZOLE SODIUM 40 MILLIGRAM(S): 20 TABLET, DELAYED RELEASE ORAL at 05:21

## 2019-04-28 RX ADMIN — MIDODRINE HYDROCHLORIDE 10 MILLIGRAM(S): 2.5 TABLET ORAL at 06:13

## 2019-04-28 RX ADMIN — Medication 3 MILLILITER(S): at 16:04

## 2019-04-28 RX ADMIN — Medication 650 MILLIGRAM(S): at 14:59

## 2019-04-28 RX ADMIN — Medication 50 MILLIGRAM(S): at 05:21

## 2019-04-28 RX ADMIN — TIOTROPIUM BROMIDE 1 CAPSULE(S): 18 CAPSULE ORAL; RESPIRATORY (INHALATION) at 09:10

## 2019-04-28 RX ADMIN — PIPERACILLIN AND TAZOBACTAM 25 GRAM(S): 4; .5 INJECTION, POWDER, LYOPHILIZED, FOR SOLUTION INTRAVENOUS at 05:21

## 2019-04-28 RX ADMIN — Medication 50 MILLILITER(S): at 05:19

## 2019-04-28 RX ADMIN — PIPERACILLIN AND TAZOBACTAM 25 GRAM(S): 4; .5 INJECTION, POWDER, LYOPHILIZED, FOR SOLUTION INTRAVENOUS at 21:44

## 2019-04-28 RX ADMIN — Medication 50 MILLIGRAM(S): at 18:05

## 2019-04-28 RX ADMIN — Medication 1 TABLET(S): at 18:02

## 2019-04-28 RX ADMIN — MIDODRINE HYDROCHLORIDE 10 MILLIGRAM(S): 2.5 TABLET ORAL at 21:44

## 2019-04-28 NOTE — PROGRESS NOTE ADULT - ASSESSMENT
58 yo F, remote lymphoma, pulm nodules, ILD, chronic b/l SDH, prolonged hosp since last month for neuro decompensation- new onset Sz, cerebral ischemia, as well tachycardia, underlying chronic lung dz.    She was briefly at rehab, only to require admission for rectal bleed- mult Units PRBCs, ICU monitoring, pressors- covered for sepsis    Afebrile, but leukocytosis and elevated PCT.    CT with basilar infiltrates, changes on CXR, might question pneumonia, but difficult to distinguish from chronic, underlying lung dz  Bld Cxs negative thus far  Remains afebrile, more alert, WBC now normal    Plan:  Vanco and Zosyn empiric, ?pneumonia  Follow temps and CBC/diff   Follow PCT level  GI f/u for lower GIB  D/w pt and family at bedside

## 2019-04-28 NOTE — PROGRESS NOTE ADULT - SUBJECTIVE AND OBJECTIVE BOX
Patient is a 59y old  Female who presents with a chief complaint of Rectal bleed (28 Apr 2019 18:06)    PAST MEDICAL & SURGICAL HISTORY:  Interstitial lung disease: on home o2 prn  NHL (non-Hodgkin's lymphoma): Agem 45 sp chemo/rt/stem cell  Transient cerebral ischemia, unspecified type  Mitral prolapse  History of tonsillectomy  History of appendectomy    ROMIE VIRAMONTES   59y    Female    BRIEF HOSPITAL COURSE:     58 y/o female pmhx of MVP, chronic subdural hematoma, interstitial lung disease pneumothorax seizures, pulmonary nodules, Meniere's disease, non hodgkin's lymphoma s/p SCD/XRT/Chemo 2003, TIA, tachycardia, occipital neuralgia, neuropathy, recently admitted to [facility]  rehab on 4/19, was admitted to telemetry from rehab for bloody BMs, while in tele became hypotensive transferred to ICU. Was given 2 units PRBC, 2FFP and 1 plt, at this time.  Admitted to ICU for hypovolemic shock 2/2 GI bleed, requiring IV vasopressor therapy.     Off pressors no further bleeding got a unit of packed cells today     Review of Systems:                       All other ROS are negative.    Allergies    IV Contrast (Anaphylaxis)  shellfish. (Anaphylaxis)    Intolerances          ICU Vital Signs Last 24 Hrs  T(C): 36.5 (28 Apr 2019 17:00), Max: 37 (27 Apr 2019 22:00)  T(F): 97.7 (28 Apr 2019 17:00), Max: 98.6 (27 Apr 2019 22:00)  HR: 64 (28 Apr 2019 20:00) (49 - 133)  BP: 110/55 (28 Apr 2019 20:00) (74/43 - 119/77)  BP(mean): 71 (28 Apr 2019 20:00) (52 - 106)  ABP: --  ABP(mean): --  RR: 28 (28 Apr 2019 20:00) (10 - 49)  SpO2: 100% (28 Apr 2019 20:00) (96% - 100%)    Physical Examination:    General:     HEENT:     PULM:     CVS:     ABD:     EXT:     SKIN:     Neuro:    ABG - ( 27 Apr 2019 02:09 )  pH, Arterial: 7.47  pH, Blood: x     /  pCO2: 40    /  pO2: 145   / HCO3: x     / Base Excess: x     /  SaO2: >99                   LABS:                        8.1    10.8  )-----------( 193      ( 28 Apr 2019 11:35 )             24.8     04-28    145  |  105  |  6<L>  ----------------------------<  124<H>  3.5   |  34<H>  |  0.56    Ca    9.0      28 Apr 2019 05:30  Phos  4.5     04-28  Mg     2.0     04-28      CAPILLARY BLOOD GLUCOSE      CULTURES:  Culture Results:   No growth to date. (04-27 @ 08:10)      Medications:  MEDICATIONS  (STANDING):  ALBUTerol/ipratropium for Nebulization 3 milliLiter(s) Nebulizer every 6 hours  ALPRAZolam 0.5 milliGRAM(s) Oral three times a day  buDESOnide   0.5 milliGRAM(s) Respule 0.5 milliGRAM(s) Inhalation two times a day  chlorhexidine 4% Liquid 1 Application(s) Topical every 12 hours  hydrocortisone sodium succinate Injectable 50 milliGRAM(s) IV Push every 12 hours  hydrocortisone sodium succinate Injectable   IV Push   lactated ringers. 1000 milliLiter(s) (125 mL/Hr) IV Continuous <Continuous>  lidocaine   Patch 1 Patch Transdermal daily  midodrine 10 milliGRAM(s) Oral three times a day  multivitamin 1 Tablet(s) Oral daily  pantoprazole  Injectable 40 milliGRAM(s) IV Push every 12 hours  piperacillin/tazobactam IVPB. 3.375 Gram(s) IV Intermittent every 8 hours  tiotropium 18 MICROgram(s) Capsule 1 Capsule(s) Inhalation daily  valproate sodium IVPB 500 milliGRAM(s) IV Intermittent every 12 hours  vancomycin  IVPB 1000 milliGRAM(s) IV Intermittent every 12 hours  zinc oxide 20% Ointment 1 Application(s) Topical daily    MEDICATIONS  (PRN):  acetaminophen   Tablet .. 650 milliGRAM(s) Oral every 6 hours PRN Temp greater or equal to 38C (100.4F), Mild Pain (1 - 3)  ALBUTerol/ipratropium for Nebulization 3 milliLiter(s) Nebulizer every 6 hours PRN Shortness of Breath and/or Wheezing  guaiFENesin    Syrup 100 milliGRAM(s) Oral every 6 hours PRN Cough  haloperidol    Injectable 2.5 milliGRAM(s) IntraMuscular once PRN agitation  ondansetron Injectable 4 milliGRAM(s) IV Push every 6 hours PRN Nausea and/or Vomiting  sodium chloride 0.9% lock flush 10 milliLiter(s) IV Push every 1 hour PRN Pre/post blood products, medications, blood draw, and to maintain line patency      04-27 @ 07:01  -  04-28 @ 07:00  --------------------------------------------------------  IN: 7510.6 mL / OUT: 0 mL / NET: 7510.6 mL    04-28 @ 07:01  -  04-28 @ 21:07  --------------------------------------------------------  IN: 3381 mL / OUT: 0 mL / NET: 3381 mL        RADIOLOGY/IMAGING/ECHO    < from: CT Abdomen and Pelvis No Cont (04.24.19 @ 10:39) >  IMPRESSION:     Tiny right pneumothorax.  Several indeterminate hepatic lesions measuring up to 2.5 cm. Contrast   enhanced study recommended for further assessment and characterization.  Marked rectal distention by stool.        < from: TTE Echo Complete w/Doppler (04.26.19 @ 11:26) >  Summary:   1. Left ventricular ejection fraction, by visual estimation, is 55%.   2. Normal global left ventricular systolic function.   3. Spectral Doppler shows impaired relaxation pattern of left   ventricular myocardial filling (Grade I diastolic dysfunction).   4. There is mild concentric left ventricular hypertrophy.   5. Myxomatous mitral valve.   6. Moderate-severe tricuspid regurgitation.   7. Estimatedpulmonary artery systolic pressure is 50.4 mmHg assuming a   right atrial pressure of 10 mmHg, which is consistent with moderate   pulmonary hypertension.   8. Pulmonary hypertension is present.   9. LA volume Index is 21.6 ml/m² ml/m2.       from: CT Abdomen and Pelvis No Cont (04.24.19 @ 10:39) >  IMPRESSION:     Tiny right pneumothorax.  Several indeterminate hepatic lesions measuring up to 2.5 cm. Contrast   enhanced study recommended for further assessment and characterization.  Marked rectal distention by stool.      Assessment/Plan:          PNA  LGIB  EGD negative    Serial CBC  Eventual colonoscopy     Mod Pulm HTN  Grade 1 DD   PTX Patient is a 59y old  Female who presents with a chief complaint of Rectal bleed (28 Apr 2019 18:06)    PAST MEDICAL & SURGICAL HISTORY:  Interstitial lung disease: on home o2 prn  NHL (non-Hodgkin's lymphoma): Agem 45 sp chemo/rt/stem cell  Transient cerebral ischemia, unspecified type  Mitral prolapse  History of tonsillectomy  History of appendectomy    ROMIE VIRAMONTES   59y    Female    BRIEF HOSPITAL COURSE:     58 y/o female pmhx of MVP, chronic subdural hematoma, interstitial lung disease pneumothorax seizures, pulmonary nodules, Meniere's disease, non hodgkin's lymphoma s/p SCD/XRT/Chemo 2003, TIA, tachycardia, occipital neuralgia, neuropathy, recently admitted to [place]  rehab on 4/19, was admitted to telemetry from rehab for bloody BMs, while in tele became hypotensive transferred to ICU. Was given 2 units PRBC, 2FFP and 1 plt, at this time.  Admitted to ICU for hypovolemic shock 2/2 GI bleed, requiring IV vasopressor therapy.     Off pressors no further bleeding got a unit of packed cells today.    CP atypical earlier, gone now EKG was OK     Review of Systems:  as above                     All other ROS are negative.    Allergies    IV Contrast (Anaphylaxis)  shellfish. (Anaphylaxis)    Intolerances    ICU Vital Signs Last 24 Hrs  T(C): 36.5 (28 Apr 2019 17:00), Max: 37 (27 Apr 2019 22:00)  T(F): 97.7 (28 Apr 2019 17:00), Max: 98.6 (27 Apr 2019 22:00)  HR: 64 (28 Apr 2019 20:00) (49 - 133)  BP: 110/55 (28 Apr 2019 20:00) (74/43 - 119/77)  BP(mean): 71 (28 Apr 2019 20:00) (52 - 106)  ABP: --  ABP(mean): --  RR: 28 (28 Apr 2019 20:00) (10 - 49)  SpO2: 100% (28 Apr 2019 20:00) (96% - 100%)    Physical Examination:    General:  NAD    HEENT:   RIJ TLC site clean    PULM:  bilateral rhonchi    CVS: s1 s2 reg    ABD: soft     EXT: no edema     SKIN:  warm    Neuro: alert NF    ABG - ( 27 Apr 2019 02:09 )  pH, Arterial: 7.47  pH, Blood: x     /  pCO2: 40    /  pO2: 145   / HCO3: x     / Base Excess: x     /  SaO2: >99           LABS:                        8.1    10.8  )-----------( 193      ( 28 Apr 2019 11:35 )             24.8     04-28    145  |  105  |  6<L>  ----------------------------<  124<H>  3.5   |  34<H>  |  0.56    Ca    9.0      28 Apr 2019 05:30  Phos  4.5     04-28  Mg     2.0     04-28      CAPILLARY BLOOD GLUCOSE      CULTURES:  Culture Results:   No growth to date. (04-27 @ 08:10)      Medications:  MEDICATIONS  (STANDING):  ALBUTerol/ipratropium for Nebulization 3 milliLiter(s) Nebulizer every 6 hours  ALPRAZolam 0.5 milliGRAM(s) Oral three times a day  buDESOnide   0.5 milliGRAM(s) Respule 0.5 milliGRAM(s) Inhalation two times a day  chlorhexidine 4% Liquid 1 Application(s) Topical every 12 hours  hydrocortisone sodium succinate Injectable 50 milliGRAM(s) IV Push every 12 hours  hydrocortisone sodium succinate Injectable   IV Push   lactated ringers. 1000 milliLiter(s) (125 mL/Hr) IV Continuous <Continuous>  lidocaine   Patch 1 Patch Transdermal daily  midodrine 10 milliGRAM(s) Oral three times a day  multivitamin 1 Tablet(s) Oral daily  pantoprazole  Injectable 40 milliGRAM(s) IV Push every 12 hours  piperacillin/tazobactam IVPB. 3.375 Gram(s) IV Intermittent every 8 hours  tiotropium 18 MICROgram(s) Capsule 1 Capsule(s) Inhalation daily  valproate sodium IVPB 500 milliGRAM(s) IV Intermittent every 12 hours  vancomycin  IVPB 1000 milliGRAM(s) IV Intermittent every 12 hours  zinc oxide 20% Ointment 1 Application(s) Topical daily    MEDICATIONS  (PRN):  acetaminophen   Tablet .. 650 milliGRAM(s) Oral every 6 hours PRN Temp greater or equal to 38C (100.4F), Mild Pain (1 - 3)  ALBUTerol/ipratropium for Nebulization 3 milliLiter(s) Nebulizer every 6 hours PRN Shortness of Breath and/or Wheezing  guaiFENesin    Syrup 100 milliGRAM(s) Oral every 6 hours PRN Cough  haloperidol    Injectable 2.5 milliGRAM(s) IntraMuscular once PRN agitation  ondansetron Injectable 4 milliGRAM(s) IV Push every 6 hours PRN Nausea and/or Vomiting  sodium chloride 0.9% lock flush 10 milliLiter(s) IV Push every 1 hour PRN Pre/post blood products, medications, blood draw, and to maintain line patency      04-27 @ 07:01  -  04-28 @ 07:00  --------------------------------------------------------  IN: 7510.6 mL / OUT: 0 mL / NET: 7510.6 mL    04-28 @ 07:01  -  04-28 @ 21:07  --------------------------------------------------------  IN: 3381 mL / OUT: 0 mL / NET: 3381 mL        RADIOLOGY/IMAGING/ECHO    < from: CT Abdomen and Pelvis No Cont (04.24.19 @ 10:39) >  IMPRESSION:     Tiny right pneumothorax.  Several indeterminate hepatic lesions measuring up to 2.5 cm. Contrast   enhanced study recommended for further assessment and characterization.  Marked rectal distention by stool.        < from: TTE Echo Complete w/Doppler (04.26.19 @ 11:26) >  Summary:   1. Left ventricular ejection fraction, by visual estimation, is 55%.   2. Normal global left ventricular systolic function.   3. Spectral Doppler shows impaired relaxation pattern of left   ventricular myocardial filling (Grade I diastolic dysfunction).   4. There is mild concentric left ventricular hypertrophy.   5. Myxomatous mitral valve.   6. Moderate-severe tricuspid regurgitation.   7. Estimatedpulmonary artery systolic pressure is 50.4 mmHg assuming a   right atrial pressure of 10 mmHg, which is consistent with moderate   pulmonary hypertension.   8. Pulmonary hypertension is present.   9. LA volume Index is 21.6 ml/m² ml/m2.       from: CT Abdomen and Pelvis No Cont (04.24.19 @ 10:39) >  IMPRESSION:     Tiny right pneumothorax.  Several indeterminate hepatic lesions measuring up to 2.5 cm. Contrast   enhanced study recommended for further assessment and characterization.  Marked rectal distention by stool.      Assessment/Plan:      59F hx of MVP, chronic subdural hematoma, interstitial lung disease pneumothorax seizures, pulmonary nodules, Meniere's disease, non hodgkin's lymphoma s/p SCD/XRT/Chemo 2003, TIA, tachycardia, occipital neuralgia, neuropathy, recently admitted to [place] rehab. Now admitted to ICU w/ hypovolemic shock 2/2 GI bleed, acute blood loss anemia.     Shock resolved  no clinical bleeding.       PNA  on vanco zosyn   Check Vanco trough     LGIB  EGD negative Serial CBC Eventual colonoscopy     Mod Pulm HTN due to fibrosis  Grade 1 DD     PTX  is chronic at least several months old decision made in past for no CT.    Hepatic lesions refused IV contrast.  Will discuss MRI etc.

## 2019-04-28 NOTE — PROGRESS NOTE ADULT - SUBJECTIVE AND OBJECTIVE BOX
CC: f/u for sepsis    Patient reports occas cough: PRBC transfx in progress    REVIEW OF SYSTEMS:  All other review of systems negative (Comprehensive ROS)    Antimicrobials Day # 2  piperacillin/tazobactam IVPB. 3.375 Gram(s) IV Intermittent every 8 hours  vancomycin  IVPB 1000 milliGRAM(s) IV Intermittent every 12 hours    Other Medications Reviewed    T(F): 97.7 (04-28-19 @ 17:00), Max: 98.6 (04-27-19 @ 22:00)  HR: 103 (04-28-19 @ 17:00)  BP: 111/56 (04-28-19 @ 17:00)  RR: 29 (04-28-19 @ 17:00)  SpO2: 98% (04-28-19 @ 17:00)  Wt(kg): --    PHYSICAL EXAM:  General: no acute distress  Eyes:  anicteric, no conjunctival injection, no discharge  Oropharynx: no lesions or injection 	  Neck: supple, without adenopathy  Lungs: coarse ant BSs  Heart: regular rate and rhythm; no murmur, rubs or gallops  Abdomen: soft, nondistended, nontender, without mass or organomegaly  Skin: no lesions  Extremities: no edema  Neurologic: alert, moves all extremities    LAB RESULTS:                        8.1    10.8  )-----------( 193      ( 28 Apr 2019 11:35 )             24.8     04-28    145  |  105  |  6<L>  ----------------------------<  124<H>  3.5   |  34<H>  |  0.56    Ca    9.0      28 Apr 2019 05:30  Phos  4.5     04-28  Mg     2.0     04-28    MICROBIOLOGY:  RECENT CULTURES:  04-27 @ 08:10 .Blood Blood     No growth to date.    RADIOLOGY REVIEWED:  Xray Chest 1 View- PORTABLE-Routine (04.28.19 @ 07:04) >  significant infiltrates again noted suggesting some increased   particularly on the left.

## 2019-04-28 NOTE — PROGRESS NOTE ADULT - SUBJECTIVE AND OBJECTIVE BOX
Follow-up Critical Care Progress Note  Chief Complaint : Other ulcerative colitis with rectal bleeding      Patient seen and examined  pt off pressors today  no fever  mental status back to baseline.    cough continued, some secretions  no bloody BM, despite h/h drop        Allergies :IV Contrast (Anaphylaxis)  shellfish. (Anaphylaxis)      PAST MEDICAL & SURGICAL HISTORY:  Interstitial lung disease: on home o2 prn  NHL (non-Hodgkin's lymphoma): Agem 43 sp chemo/rt/stem cell  Transient cerebral ischemia, unspecified type  Mitral prolapse  History of tonsillectomy  History of appendectomy      Medications:  MEDICATIONS  (STANDING):  albumin human 25% IVPB 50 milliLiter(s) IV Intermittent every 6 hours  ALBUTerol/ipratropium for Nebulization 3 milliLiter(s) Nebulizer every 6 hours  ALPRAZolam 0.5 milliGRAM(s) Oral three times a day  buDESOnide   0.5 milliGRAM(s) Respule 0.5 milliGRAM(s) Inhalation two times a day  chlorhexidine 4% Liquid 1 Application(s) Topical every 12 hours  hydrocortisone sodium succinate Injectable 50 milliGRAM(s) IV Push every 8 hours  lactated ringers. 1000 milliLiter(s) (125 mL/Hr) IV Continuous <Continuous>  lidocaine   Patch 1 Patch Transdermal daily  midodrine 10 milliGRAM(s) Oral three times a day  pantoprazole  Injectable 40 milliGRAM(s) IV Push every 12 hours  phenylephrine    Infusion 0.2 MICROgram(s)/kG/Min (2.167 mL/Hr) IV Continuous <Continuous>  piperacillin/tazobactam IVPB. 3.375 Gram(s) IV Intermittent every 8 hours  tiotropium 18 MICROgram(s) Capsule 1 Capsule(s) Inhalation daily  valproate sodium IVPB 500 milliGRAM(s) IV Intermittent every 12 hours  vancomycin  IVPB 1000 milliGRAM(s) IV Intermittent every 12 hours  zinc oxide 20% Ointment 1 Application(s) Topical daily    MEDICATIONS  (PRN):  acetaminophen   Tablet .. 650 milliGRAM(s) Oral every 6 hours PRN Temp greater or equal to 38C (100.4F), Mild Pain (1 - 3)  ALBUTerol/ipratropium for Nebulization 3 milliLiter(s) Nebulizer every 6 hours PRN Shortness of Breath and/or Wheezing  guaiFENesin    Syrup 100 milliGRAM(s) Oral every 6 hours PRN Cough  haloperidol    Injectable 2.5 milliGRAM(s) IntraMuscular once PRN agitation  LORazepam   Injectable 1 milliGRAM(s) IV Push four times a day PRN Benzo Withdrawal  ondansetron Injectable 4 milliGRAM(s) IV Push every 6 hours PRN Nausea and/or Vomiting  sodium chloride 0.9% lock flush 10 milliLiter(s) IV Push every 1 hour PRN Pre/post blood products, medications, blood draw, and to maintain line patency      LABS:                        8.2    10.1  )-----------( 180      ( 28 Apr 2019 06:20 )             25.0     04-28    145  |  105  |  6<L>  ----------------------------<  124<H>  3.5   |  34<H>  |  0.56    Ca    9.0      28 Apr 2019 05:30  Phos  4.5     04-28  Mg     2.0     04-28                  Procalcitonin, Serum: 0.60 ng/mL (04-27-19 @ 08:09)          CULTURES: (if applicable)        ABG - ( 27 Apr 2019 02:09 )  pH, Arterial: 7.47  pH, Blood: x     /  pCO2: 40    /  pO2: 145   / HCO3: x     / Base Excess: x     /  SaO2: >99               CAPILLARY BLOOD GLUCOSE          RADIOLOGY  CXR:  CXR - Left PNA , continued increased vascular congestion    VITALS:  T(C): 36.6 (04-28-19 @ 06:00), Max: 37.2 (04-27-19 @ 17:00)  T(F): 97.9 (04-28-19 @ 06:00), Max: 98.9 (04-27-19 @ 17:00)  HR: 84 (04-28-19 @ 06:00) (65 - 136)  BP: 108/61 (04-28-19 @ 06:00) (65/44 - 112/60)  BP(mean): 75 (04-28-19 @ 06:00) (52 - 78)  ABP: --  ABP(mean): --  RR: 10 (04-28-19 @ 06:00) (10 - 32)  SpO2: 98% (04-28-19 @ 06:00) (96% - 100%)  CVP(mm Hg): --  CVP(cm H2O): --    Ins and Outs     04-27-19 @ 07:01  -  04-28-19 @ 07:00  --------------------------------------------------------  IN: 7360.6 mL / OUT: 0 mL / NET: 7360.6 mL

## 2019-04-28 NOTE — PROGRESS NOTE ADULT - ASSESSMENT
Physical Examination:  GENERAL:               Alert, Oriented, No acute distress.    HEENT:                    No JVD, dry MM No stridor  PULM:                     Bilateral air entry, Clear to auscultation bilaterally, no significant sputum production, No Rales, Left Rhonchi, No Wheezing  CVS:                         S1, S2,  +Murmur  ABD:                        Soft, nondistended, nontender, normoactive bowel sounds,   EXT:                         No edema, nontender, No Cyanosis or Clubbing   Vascular:                Warm Extremities, Normal Capillary refill,   NEURO:                  lethargic, oriented, interactive,  follows commands  PSYC:                      Calm, + Insight.    Assessment:  59 yr old female PMHx of MVP, chronic SDH, interstitial lung disease/pneumothorax, pulmonary nodules, meniere's, non hodgkins lymphoma (SCD/XRT/chemo at [facility] [place] 2003), TIA, tachycardia, occipital neuralgia, and neuropathy being admitted to the ICU for acute GI bleeding with hypotension.       1. Hypovolemic shock  with possible Septic component , Resolving, off pressors today, greatly improved with more volume  2. Left HCAP vs Aspiration PNA, -   3. Acute blood loss anemia , noted downtrending h/h  4. Gastrointestinal hemorrhage - No more episodes of bloody bowel movement despite drop in h/h  5. CVA - Left upper extremity spasm/paresis.   6. Seizures - Cont. IV meds  7. Chronic Intersitial lung disease -   8. Anxiety -  9. Sinus tachycardia New    Plan  Continue IVF  OOB to chair  D/C albumin  Monitor CBC, Q 12, if h/h continue to drop low threshold for transfusion, if drop in h/h or need for pressors  continue  Abx, appreciate ID input   taper empiric steroids for ? Adrenal Insuf.   Continue Midodrine, taper in am if off pressors.   will need eventual colonoscopy when off pressors will d/w GI  repeat CXR in am.   GI f/u  PT    SCD for DVT prophylaxis.   ontinue xanax. Monitor for signs of benzo withdrawal.  S&S Eval for PNA     Sedation & Analgesia:	n/a  Diet/Nutrition:		Advance as tolerated/oral  GI PPx:			PPI    DVT Ppx:		Venodynes	due to active bleeding       Activity:		              OOB chair  Head of Bed:               35-45 deg  Glycemic Control:        n/a    Lines: RIJ placed 4/27 - continue as poor vascular access and pt clinically slow to improve    Restraints were deemed necessary to prevent removal of life-sustaining devices [  ] YES   [    x]  NO    Disposition: ICU care    Goals of Care: Full

## 2019-04-29 ENCOUNTER — TRANSCRIPTION ENCOUNTER (OUTPATIENT)
Age: 59
End: 2019-04-29

## 2019-04-29 LAB
ANION GAP SERPL CALC-SCNC: 3 MMOL/L — LOW (ref 5–17)
ANION GAP SERPL CALC-SCNC: 5 MMOL/L — SIGNIFICANT CHANGE UP (ref 5–17)
ANION GAP SERPL CALC-SCNC: 7 MMOL/L — SIGNIFICANT CHANGE UP (ref 5–17)
BUN SERPL-MCNC: 4 MG/DL — LOW (ref 7–23)
BUN SERPL-MCNC: 6 MG/DL — LOW (ref 7–23)
BUN SERPL-MCNC: 7 MG/DL — SIGNIFICANT CHANGE UP (ref 7–23)
CALCIUM SERPL-MCNC: 9.5 MG/DL — SIGNIFICANT CHANGE UP (ref 8.4–10.5)
CALCIUM SERPL-MCNC: 9.6 MG/DL — SIGNIFICANT CHANGE UP (ref 8.4–10.5)
CALCIUM SERPL-MCNC: 9.6 MG/DL — SIGNIFICANT CHANGE UP (ref 8.4–10.5)
CHLORIDE SERPL-SCNC: 100 MMOL/L — SIGNIFICANT CHANGE UP (ref 96–108)
CHLORIDE SERPL-SCNC: 102 MMOL/L — SIGNIFICANT CHANGE UP (ref 96–108)
CHLORIDE SERPL-SCNC: 104 MMOL/L — SIGNIFICANT CHANGE UP (ref 96–108)
CO2 SERPL-SCNC: 35 MMOL/L — HIGH (ref 22–31)
CO2 SERPL-SCNC: 36 MMOL/L — HIGH (ref 22–31)
CO2 SERPL-SCNC: 36 MMOL/L — HIGH (ref 22–31)
CREAT SERPL-MCNC: 0.47 MG/DL — LOW (ref 0.5–1.3)
CREAT SERPL-MCNC: 0.52 MG/DL — SIGNIFICANT CHANGE UP (ref 0.5–1.3)
CREAT SERPL-MCNC: 0.57 MG/DL — SIGNIFICANT CHANGE UP (ref 0.5–1.3)
GLUCOSE SERPL-MCNC: 110 MG/DL — HIGH (ref 70–99)
GLUCOSE SERPL-MCNC: 152 MG/DL — HIGH (ref 70–99)
GLUCOSE SERPL-MCNC: 86 MG/DL — SIGNIFICANT CHANGE UP (ref 70–99)
HCT VFR BLD CALC: 32 % — LOW (ref 34.5–45)
HCT VFR BLD CALC: 34.1 % — LOW (ref 34.5–45)
HGB BLD-MCNC: 10.5 G/DL — LOW (ref 11.5–15.5)
HGB BLD-MCNC: 11.1 G/DL — LOW (ref 11.5–15.5)
MAGNESIUM SERPL-MCNC: 1.8 MG/DL — SIGNIFICANT CHANGE UP (ref 1.6–2.6)
MAGNESIUM SERPL-MCNC: 2.1 MG/DL — SIGNIFICANT CHANGE UP (ref 1.6–2.6)
MAGNESIUM SERPL-MCNC: 2.2 MG/DL — SIGNIFICANT CHANGE UP (ref 1.6–2.6)
MCHC RBC-ENTMCNC: 30 PG — SIGNIFICANT CHANGE UP (ref 27–34)
MCHC RBC-ENTMCNC: 30.2 PG — SIGNIFICANT CHANGE UP (ref 27–34)
MCHC RBC-ENTMCNC: 32.6 GM/DL — SIGNIFICANT CHANGE UP (ref 32–36)
MCHC RBC-ENTMCNC: 33 GM/DL — SIGNIFICANT CHANGE UP (ref 32–36)
MCV RBC AUTO: 91.6 FL — SIGNIFICANT CHANGE UP (ref 80–100)
MCV RBC AUTO: 91.9 FL — SIGNIFICANT CHANGE UP (ref 80–100)
PHOSPHATE SERPL-MCNC: 2 MG/DL — LOW (ref 2.5–4.5)
PHOSPHATE SERPL-MCNC: 2.4 MG/DL — LOW (ref 2.5–4.5)
PHOSPHATE SERPL-MCNC: 2.6 MG/DL — SIGNIFICANT CHANGE UP (ref 2.5–4.5)
PLATELET # BLD AUTO: 195 K/UL — SIGNIFICANT CHANGE UP (ref 150–400)
PLATELET # BLD AUTO: 229 K/UL — SIGNIFICANT CHANGE UP (ref 150–400)
POTASSIUM SERPL-MCNC: 2.7 MMOL/L — CRITICAL LOW (ref 3.5–5.3)
POTASSIUM SERPL-MCNC: 3.3 MMOL/L — LOW (ref 3.5–5.3)
POTASSIUM SERPL-MCNC: 3.4 MMOL/L — LOW (ref 3.5–5.3)
POTASSIUM SERPL-SCNC: 2.7 MMOL/L — CRITICAL LOW (ref 3.5–5.3)
POTASSIUM SERPL-SCNC: 3.3 MMOL/L — LOW (ref 3.5–5.3)
POTASSIUM SERPL-SCNC: 3.4 MMOL/L — LOW (ref 3.5–5.3)
PROCALCITONIN SERPL-MCNC: 0.52 NG/ML — HIGH
RBC # BLD: 3.49 M/UL — LOW (ref 3.8–5.2)
RBC # BLD: 3.7 M/UL — LOW (ref 3.8–5.2)
RBC # FLD: 14.6 % — HIGH (ref 10.3–14.5)
RBC # FLD: 14.7 % — HIGH (ref 10.3–14.5)
SODIUM SERPL-SCNC: 141 MMOL/L — SIGNIFICANT CHANGE UP (ref 135–145)
SODIUM SERPL-SCNC: 143 MMOL/L — SIGNIFICANT CHANGE UP (ref 135–145)
SODIUM SERPL-SCNC: 144 MMOL/L — SIGNIFICANT CHANGE UP (ref 135–145)
WBC # BLD: 14 K/UL — HIGH (ref 3.8–10.5)
WBC # BLD: 15 K/UL — HIGH (ref 3.8–10.5)
WBC # FLD AUTO: 14 K/UL — HIGH (ref 3.8–10.5)
WBC # FLD AUTO: 15 K/UL — HIGH (ref 3.8–10.5)

## 2019-04-29 PROCEDURE — 71045 X-RAY EXAM CHEST 1 VIEW: CPT | Mod: 26

## 2019-04-29 RX ORDER — POTASSIUM CHLORIDE 20 MEQ
40 PACKET (EA) ORAL EVERY 4 HOURS
Qty: 0 | Refills: 0 | Status: COMPLETED | OUTPATIENT
Start: 2019-04-29 | End: 2019-04-29

## 2019-04-29 RX ORDER — POTASSIUM CHLORIDE 20 MEQ
20 PACKET (EA) ORAL ONCE
Qty: 0 | Refills: 0 | Status: DISCONTINUED | OUTPATIENT
Start: 2019-04-29 | End: 2019-04-29

## 2019-04-29 RX ORDER — POTASSIUM CHLORIDE 20 MEQ
10 PACKET (EA) ORAL
Qty: 0 | Refills: 0 | Status: COMPLETED | OUTPATIENT
Start: 2019-04-29 | End: 2019-04-29

## 2019-04-29 RX ORDER — SOD SULF/SODIUM/NAHCO3/KCL/PEG
1000 SOLUTION, RECONSTITUTED, ORAL ORAL
Qty: 0 | Refills: 0 | Status: COMPLETED | OUTPATIENT
Start: 2019-04-29 | End: 2019-04-29

## 2019-04-29 RX ORDER — MULTIVIT WITH MIN/MFOLATE/K2 340-15/3 G
1 POWDER (GRAM) ORAL ONCE
Qty: 0 | Refills: 0 | Status: COMPLETED | OUTPATIENT
Start: 2019-04-29 | End: 2019-04-29

## 2019-04-29 RX ORDER — VALPROIC ACID (AS SODIUM SALT) 250 MG/5ML
500 SOLUTION, ORAL ORAL EVERY 12 HOURS
Qty: 0 | Refills: 0 | Status: DISCONTINUED | OUTPATIENT
Start: 2019-04-29 | End: 2019-05-09

## 2019-04-29 RX ORDER — POTASSIUM PHOSPHATE, MONOBASIC POTASSIUM PHOSPHATE, DIBASIC 236; 224 MG/ML; MG/ML
30 INJECTION, SOLUTION INTRAVENOUS ONCE
Qty: 0 | Refills: 0 | Status: COMPLETED | OUTPATIENT
Start: 2019-04-29 | End: 2019-04-29

## 2019-04-29 RX ORDER — POTASSIUM PHOSPHATE, MONOBASIC POTASSIUM PHOSPHATE, DIBASIC 236; 224 MG/ML; MG/ML
15 INJECTION, SOLUTION INTRAVENOUS ONCE
Qty: 0 | Refills: 0 | Status: DISCONTINUED | OUTPATIENT
Start: 2019-04-29 | End: 2019-04-29

## 2019-04-29 RX ORDER — SOD SULF/SODIUM/NAHCO3/KCL/PEG
1000 SOLUTION, RECONSTITUTED, ORAL ORAL
Qty: 0 | Refills: 0 | Status: COMPLETED | OUTPATIENT
Start: 2019-04-29 | End: 2019-04-30

## 2019-04-29 RX ORDER — MAGNESIUM SULFATE 500 MG/ML
1 VIAL (ML) INJECTION
Qty: 0 | Refills: 0 | Status: COMPLETED | OUTPATIENT
Start: 2019-04-29 | End: 2019-04-29

## 2019-04-29 RX ADMIN — ZINC OXIDE 1 APPLICATION(S): 200 OINTMENT TOPICAL at 13:14

## 2019-04-29 RX ADMIN — Medication 100 GRAM(S): at 07:36

## 2019-04-29 RX ADMIN — Medication 1000 MILLILITER(S): at 16:16

## 2019-04-29 RX ADMIN — Medication 40 MILLIEQUIVALENT(S): at 08:40

## 2019-04-29 RX ADMIN — Medication 650 MILLIGRAM(S): at 04:44

## 2019-04-29 RX ADMIN — PANTOPRAZOLE SODIUM 40 MILLIGRAM(S): 20 TABLET, DELAYED RELEASE ORAL at 17:24

## 2019-04-29 RX ADMIN — Medication 100 MILLIEQUIVALENT(S): at 18:26

## 2019-04-29 RX ADMIN — Medication 100 MILLIEQUIVALENT(S): at 23:47

## 2019-04-29 RX ADMIN — PANTOPRAZOLE SODIUM 40 MILLIGRAM(S): 20 TABLET, DELAYED RELEASE ORAL at 05:27

## 2019-04-29 RX ADMIN — TIOTROPIUM BROMIDE 1 CAPSULE(S): 18 CAPSULE ORAL; RESPIRATORY (INHALATION) at 08:49

## 2019-04-29 RX ADMIN — Medication 250 MILLIGRAM(S): at 17:25

## 2019-04-29 RX ADMIN — Medication 27.5 MILLIGRAM(S): at 05:27

## 2019-04-29 RX ADMIN — Medication 650 MILLIGRAM(S): at 03:44

## 2019-04-29 RX ADMIN — Medication 50 MILLIEQUIVALENT(S): at 08:14

## 2019-04-29 RX ADMIN — Medication 40 MILLIEQUIVALENT(S): at 13:13

## 2019-04-29 RX ADMIN — Medication 500 MILLIGRAM(S): at 17:25

## 2019-04-29 RX ADMIN — Medication 50 MILLIGRAM(S): at 05:27

## 2019-04-29 RX ADMIN — Medication 100 MILLIEQUIVALENT(S): at 18:55

## 2019-04-29 RX ADMIN — PIPERACILLIN AND TAZOBACTAM 25 GRAM(S): 4; .5 INJECTION, POWDER, LYOPHILIZED, FOR SOLUTION INTRAVENOUS at 13:00

## 2019-04-29 RX ADMIN — CHLORHEXIDINE GLUCONATE 1 APPLICATION(S): 213 SOLUTION TOPICAL at 05:29

## 2019-04-29 RX ADMIN — Medication 3 MILLILITER(S): at 08:50

## 2019-04-29 RX ADMIN — Medication 0.5 MILLIGRAM(S): at 08:50

## 2019-04-29 RX ADMIN — ONDANSETRON 4 MILLIGRAM(S): 8 TABLET, FILM COATED ORAL at 20:24

## 2019-04-29 RX ADMIN — CHLORHEXIDINE GLUCONATE 1 APPLICATION(S): 213 SOLUTION TOPICAL at 18:56

## 2019-04-29 RX ADMIN — Medication 1 BOTTLE: at 22:41

## 2019-04-29 RX ADMIN — Medication 1 TABLET(S): at 13:13

## 2019-04-29 RX ADMIN — Medication 250 MILLIGRAM(S): at 06:15

## 2019-04-29 RX ADMIN — Medication 100 MILLIEQUIVALENT(S): at 22:34

## 2019-04-29 RX ADMIN — ONDANSETRON 4 MILLIGRAM(S): 8 TABLET, FILM COATED ORAL at 11:15

## 2019-04-29 RX ADMIN — PIPERACILLIN AND TAZOBACTAM 25 GRAM(S): 4; .5 INJECTION, POWDER, LYOPHILIZED, FOR SOLUTION INTRAVENOUS at 21:07

## 2019-04-29 RX ADMIN — Medication 3 MILLILITER(S): at 15:33

## 2019-04-29 RX ADMIN — Medication 650 MILLIGRAM(S): at 15:00

## 2019-04-29 RX ADMIN — Medication 50 MILLIEQUIVALENT(S): at 08:41

## 2019-04-29 RX ADMIN — POTASSIUM PHOSPHATE, MONOBASIC POTASSIUM PHOSPHATE, DIBASIC 83.33 MILLIMOLE(S): 236; 224 INJECTION, SOLUTION INTRAVENOUS at 22:35

## 2019-04-29 RX ADMIN — Medication 650 MILLIGRAM(S): at 16:00

## 2019-04-29 RX ADMIN — Medication 100 MILLIEQUIVALENT(S): at 17:35

## 2019-04-29 RX ADMIN — PIPERACILLIN AND TAZOBACTAM 25 GRAM(S): 4; .5 INJECTION, POWDER, LYOPHILIZED, FOR SOLUTION INTRAVENOUS at 05:28

## 2019-04-29 RX ADMIN — Medication 100 GRAM(S): at 08:56

## 2019-04-29 RX ADMIN — MIDODRINE HYDROCHLORIDE 10 MILLIGRAM(S): 2.5 TABLET ORAL at 05:29

## 2019-04-29 NOTE — CHART NOTE - NSCHARTNOTEFT_GEN_A_CORE
Nutrition Follow Up Note  Hospital Course (Per Electronic Medical Record): 59 yr old female PMHx of MVP, chronic SDH, interstitial lung disease/pneumothorax, pulmonary nodules, meniere's, non hodgkins lymphoma (SCD/XRT/chemo at [facility] 2003), TIA, tachycardia, occipital neuralgia, and neuropathy admitted to the ICU for acute GI bleeding with hypotension. Pt on soft consistency, lacto-ovo vegetarian diet w/ fair PO intakes (consuming between 40-75% of meals), supplemented w/ Ensure Enlive 240 ml PO BID (provides up to 700 kcal, 40 g protein). At time of f/up, pt appeared anxious to eat d/t pending colonoscopy (possibly tomorrow AM per MD note). Encouraged pt to consume food/fluids of current diet. Skin intact of PU; noted w/ IAD on perineum; not receptive to other supplements. Cont to encourage PO intake as tolerated. Last BM was today; reports feeling nauseas earlier today d/t multiple medications; explained benefits of small meals w/r/t nausea.       Source: Medical Record [X] Patient [ ] Family [ ]         Diet: soft, lacto-ovo vegetarian     Current Weight: 129.8 lbs (4/29/19)  127.8 lbs (4/25/19)  127 lbs (4/23/19)    Pertinent Medications: MEDICATIONS  (STANDING):  ALBUTerol/ipratropium for Nebulization 3 milliLiter(s) Nebulizer every 6 hours  ALPRAZolam 0.5 milliGRAM(s) Oral three times a day  buDESOnide   0.5 milliGRAM(s) Respule 0.5 milliGRAM(s) Inhalation two times a day  chlorhexidine 4% Liquid 1 Application(s) Topical every 12 hours  hydrocortisone sodium succinate Injectable 50 milliGRAM(s) IV Push daily  hydrocortisone sodium succinate Injectable   IV Push   lactated ringers. 1000 milliLiter(s) (75 mL/Hr) IV Continuous <Continuous>  lidocaine   Patch 1 Patch Transdermal daily  multivitamin 1 Tablet(s) Oral daily  pantoprazole  Injectable 40 milliGRAM(s) IV Push every 12 hours  piperacillin/tazobactam IVPB. 3.375 Gram(s) IV Intermittent every 8 hours  potassium chloride    Tablet ER 40 milliEquivalent(s) Oral every 4 hours  tiotropium 18 MICROgram(s) Capsule 1 Capsule(s) Inhalation daily  valproic acid 500 milliGRAM(s) Oral every 12 hours  vancomycin  IVPB 1000 milliGRAM(s) IV Intermittent every 12 hours  zinc oxide 20% Ointment 1 Application(s) Topical daily    MEDICATIONS  (PRN):  acetaminophen   Tablet .. 650 milliGRAM(s) Oral every 6 hours PRN Temp greater or equal to 38C (100.4F), Mild Pain (1 - 3)  ALBUTerol/ipratropium for Nebulization 3 milliLiter(s) Nebulizer every 6 hours PRN Shortness of Breath and/or Wheezing  guaiFENesin    Syrup 100 milliGRAM(s) Oral every 6 hours PRN Cough  haloperidol    Injectable 2.5 milliGRAM(s) IntraMuscular once PRN agitation  ondansetron Injectable 4 milliGRAM(s) IV Push every 6 hours PRN Nausea and/or Vomiting  sodium chloride 0.9% lock flush 10 milliLiter(s) IV Push every 1 hour PRN Pre/post blood products, medications, blood draw, and to maintain line patency      Pertinent Labs:  04-29 Na144 mmol/L Glu 152 mg/dL<H> K+ 2.7 mmol/L<LL> Cr  0.57 mg/dL BUN 7 mg/dL 04-29 Phos 2.6 mg/dL 04-23 Alb 2.6 g/dL<L>        Skin: Intact of PU; noted w/ IAD on perineum per chart review    Edema: none per chart review     Last BM: on 4/29    Estimated Needs:   [X] No Change since Previous Assessment  [ ] Recalculated:     Previous Nutrition Diagnosis:   · Nutrition Diagnostic Terminology #1: Nutrient  · Nutrient: Malnutrition  · Etiology: r/t alteration in GI tract function d/t ulcerative colitis  · Signs/Symptoms: AEB muscle loss from temples, calves, estimated intake <50-75% of needs  · Nutrition Intervention: Meals and Snack  · Meals and Snacks: General/healthful diet; Other (specify); MVI w/ minerals, recommend oral nutrition supplement (Ensure Enlive PO BID) when appropriate  · Goal/Expected Outcome: Pt to tolerate diet upgrade & supplements as medically feasible per MD x 3 days     Nutrition Diagnostic #2:  · Nutrition Diagnostic Terminology #2: Nutrient  · Nutrient: Increased nutrient needs (specify); calories, protein, fat-soluble vitamins and minerals  · Etiology: r/t altered gastrointestinal function  · Signs/Symptoms: AEB altered nutrition-related lab values (low H/H)  · Nutrition Intervention: Meals and Snack; Vitamin; Mineral  · Meals and Snacks: General/healthful diet  · Vitamin: Multivitamin/mineral  · Mineral: Iron  · Goal/Expected Outcome: Pt to tolerate diet upgrade as medically feasible & tolerate MVI w/ minerals as appropriate x 3 days        Nutrition Diagnosis is [X] Ongoing    [ ] Resolved   [X] Not Applicable      New Nutrition Diagnosis: [X] Not Applicable  [ ] Inadequate Protein Energy Intake   [ ] Inadequate Oral Intake   [ ] Excessive Energy Intake   [ ] Increased Nutrient Needs   [ ] Obesity   [ ] Altered GI Function   [ ] Unintended Weight Loss   [ ] Food & Nutrition Related Knowledge Deficit  [ ] Limited Adherence to nutrition related recommendations   [ ] Malnutrition      Interventions:   1. Recommend continue w/ current POC  2. Encourage PO intake as tolerated  3. f/up x 3 days- pt may be more receptive to additional supplements/PO intake in a few days as pt expressed anxiety r/t possible colonscopy prep    Monitoring & Evaluation:   [X] Weights   [X] PO Intake   [X] Follow Up (Per Protocol)  [X] Tolerance to Diet Prescription   [X] Other: Labs & PCOT    RD Remains Available.  Kristen Hilton RD

## 2019-04-29 NOTE — PROGRESS NOTE ADULT - SUBJECTIVE AND OBJECTIVE BOX
INTERVAL HPI/OVERNIGHT EVENTS:  HPI:    60 y/o female PMHx of MVP, chronic SDH, interstitial lung disease/pneumothorax, pulmonary nodules, meniere's, non hodgkins lymphoma (SCD/XRT/chemo at [facility] ), TIA, tachycardia, occipital neuralgia, and neuropathy. GI following patient due to persistent GI bleed. Patient seen and examined at bedside in CCU.Denies nausea, vomiting, abdominal pain. No further episodes of GI bleeding noted.     MEDICATIONS  (STANDING):  ALBUTerol/ipratropium for Nebulization 3 milliLiter(s) Nebulizer every 6 hours  ALPRAZolam 0.5 milliGRAM(s) Oral three times a day  buDESOnide   0.5 milliGRAM(s) Respule 0.5 milliGRAM(s) Inhalation two times a day  chlorhexidine 4% Liquid 1 Application(s) Topical every 12 hours  hydrocortisone sodium succinate Injectable 50 milliGRAM(s) IV Push daily  hydrocortisone sodium succinate Injectable   IV Push   lactated ringers. 1000 milliLiter(s) (75 mL/Hr) IV Continuous <Continuous>  lidocaine   Patch 1 Patch Transdermal daily  multivitamin 1 Tablet(s) Oral daily  pantoprazole  Injectable 40 milliGRAM(s) IV Push every 12 hours  piperacillin/tazobactam IVPB. 3.375 Gram(s) IV Intermittent every 8 hours  polyethylene glycol/electrolyte Solution 1000 milliLiter(s) Oral <User Schedule>  polyethylene glycol/electrolyte Solution 1000 milliLiter(s) Oral <User Schedule>  tiotropium 18 MICROgram(s) Capsule 1 Capsule(s) Inhalation daily  valproic acid 500 milliGRAM(s) Oral every 12 hours  vancomycin  IVPB 1000 milliGRAM(s) IV Intermittent every 12 hours  zinc oxide 20% Ointment 1 Application(s) Topical daily    MEDICATIONS  (PRN):  acetaminophen   Tablet .. 650 milliGRAM(s) Oral every 6 hours PRN Temp greater or equal to 38C (100.4F), Mild Pain (1 - 3)  ALBUTerol/ipratropium for Nebulization 3 milliLiter(s) Nebulizer every 6 hours PRN Shortness of Breath and/or Wheezing  guaiFENesin    Syrup 100 milliGRAM(s) Oral every 6 hours PRN Cough  haloperidol    Injectable 2.5 milliGRAM(s) IntraMuscular once PRN agitation  ondansetron Injectable 4 milliGRAM(s) IV Push every 6 hours PRN Nausea and/or Vomiting  sodium chloride 0.9% lock flush 10 milliLiter(s) IV Push every 1 hour PRN Pre/post blood products, medications, blood draw, and to maintain line patency      Allergies    IV Contrast (Anaphylaxis)  shellfish. (Anaphylaxis)    Intolerances        PHYSICAL EXAM:   Vital Signs:  Vital Signs Last 24 Hrs  T(C): 36.6 (2019 08:00), Max: 36.7 (2019 22:00)  T(F): 97.8 (2019 08:00), Max: 98.1 (2019 22:00)  HR: 67 (2019 12:00) (39 - 133)  BP: 118/66 (2019 12:00) (91/50 - 139/57)  BP(mean): 82 (2019 12:00) (63 - 102)  RR: 22 (2019 12:00) (17 - 49)  SpO2: 100% (2019 12:00) (97% - 100%)  Daily     Daily Weight in k.9 (2019 05:00)I&O's Summary    2019 07:  -  2019 07:00  --------------------------------------------------------  IN: 5366 mL / OUT: 0 mL / NET: 5366 mL    2019 07:  -  2019 13:38  --------------------------------------------------------  IN: 985 mL / OUT: 0 mL / NET: 985 mL    GENERAL:  Appears stated age,  HEENT:  NC/AT,  conjunctivae clear and pink  CHEST:  Full & symmetric excursion, no increased effort, breath sounds clear  HEART:  Regular rhythm, S1, S2  ABDOMEN:  Soft, non-tender, non-distended, normoactive bowel sounds  EXTEREMITIES:  no edema, L side weakness  SKIN:  No rash, warm/dry  NEURO:  Alert, oriented, lethargic       LABS:                        10.5   15.0  )-----------( 195      ( 2019 04:54 )             32.0     -    144  |  102  |  7   ----------------------------<  152<H>  2.7<LL>   |  35<H>  |  0.57    Ca    9.6      2019 04:54  Phos  2.6       Mg     1.8               amylase   lipase  RADIOLOGY & ADDITIONAL TESTS:

## 2019-04-29 NOTE — SWALLOW BEDSIDE ASSESSMENT ADULT - SLP GENERAL OBSERVATIONS
Pt awake and alert in bed, clear vocal quality, Sa02 100%, baseline cough, intermittently elevated RR at baseline and throughout assessment

## 2019-04-29 NOTE — SWALLOW BEDSIDE ASSESSMENT ADULT - ADDITIONAL RECOMMENDATIONS
Monitor diet tolerance. Consider MBS for objective view of pharyngeal stage if silent aspiration is of concern.

## 2019-04-29 NOTE — PROGRESS NOTE ADULT - ASSESSMENT
58 y/o female with multiple medical issues who was transferred from rehab this morning s/p rapid response due to onset of lower GI Bleed. H/H 10.5/32.0. Abdomen soft, non-tender. GI bleeding subsided. EGD done on 4/24/19, no source of UGIB identified. Patient did have drop in hemoglobin yesterday to 8.1/24.8.

## 2019-04-29 NOTE — PHYSICAL THERAPY INITIAL EVALUATION ADULT - PATIENT/FAMILY AGREES WITH PLAN
Since last Monday pt has had cough, fever, chills since last Monday. Pt having increased cough, shortness of breath. Took mucinex today. Pt had a miscarriage. Had a D@C on 3/9   yes

## 2019-04-29 NOTE — PROGRESS NOTE ADULT - ASSESSMENT
60 yo F, remote lymphoma, pulm nodules, ILD, chronic b/l SDH, prolonged hosp since last month for neuro decompensation- new onset Sz, cerebral ischemia, as well tachycardia, underlying chronic lung dz.    She was briefly at rehab, only to require admission for rectal bleed- mult Units PRBCs, ICU monitoring, pressors- covered for sepsis    Afebrile, but leukocytosis and elevated PCT.    CT with basilar infiltrates, changes on CXR, might question pneumonia, but difficult to distinguish from chronic, underlying lung dz  Bld Cxs negative thus far  Remains afebrile, more alert, WBC elevations are non specific  She does not look toxic or septic  Plan:  Vanco and Zosyn empiric, ?pneumonia, will limit patient to 5 days  Follow temps and CBC/diff   Follow PCT level  GI f/u for lower GIB  D/w pt and daughter at bedside

## 2019-04-29 NOTE — PROGRESS NOTE ADULT - ASSESSMENT
Physical Examination:  GENERAL:               Alert, Oriented, No acute distress.    HEENT:                    No JVD, dry MM No stridor  PULM:                     Bilateral air entry, Clear to auscultation bilaterally, no significant sputum production, No Rales, Left Rhonchi, No Wheezing  CVS:                         S1, S2,  +Murmur  ABD:                        Soft, nondistended, nontender, normoactive bowel sounds,   EXT:                         No edema, nontender, No Cyanosis or Clubbing   Vascular:                Warm Extremities, Normal Capillary refill,   NEURO:                  Alert, oriented, interactive,  follows commands  PSYC:                      Calm, + Insight.    Assessment:  59 yr old female PMHx of MVP, chronic SDH, interstitial lung disease/pneumothorax, pulmonary nodules, meniere's, non hodgkins lymphoma (SCD/XRT/chemo at [facility] [place] 2003), TIA, tachycardia, occipital neuralgia, and neuropathy being admitted to the ICU for acute GI bleeding with hypotension.       1. Hypovolemic shock  with possible Septic component , Remains off pressors today, greatly improved with more volume/Blood  2. Left HCAP vs Aspiration PNA, - on abx  3. Acute blood loss anemia , noted downtrending h/h  4. Gastrointestinal hemorrhage - No more episodes of bloody bowel movement despite drop in h/h suspect drop in h/h hemodilutional from albumin/fluid resuscitation   5. CVA - Left upper extremity spasm/paresis.   6. Seizures -   7. Chronic Intersitial lung disease -   8. Anxiety -  9. Arrythmia-  had Sinus tachycardia earlier , now sinus bradycardia - suspect Bradycardia today/overnight from electrolyte abnormalities and ? Side effect of midodrine    Plan    PT, OOB to chair  D/C albumin, D/C Midodrine   Taper IVF  Monitor CBC, Q 12,   S&S eval   continue  Abx, appreciate ID input   taper empiric steroids to off for ? Adrenal Insuf.     will need eventual colonoscopy when off pressors will d/w GI    will d/w GI for ? In AM    change seizure meds to PO  GI f/u    SCD for DVT prophylaxis.   ontinue xanax. Monitor for signs of benzo withdrawal.  S&S Eval for PNA     Sedation & Analgesia:	n/a  Diet/Nutrition:		Advance as tolerated/oral  GI PPx:			PPI    DVT Ppx:		Venodynes	due to active bleeding       Activity:		              OOB chair  Head of Bed:               35-45 deg  Glycemic Control:        n/a    Lines: RIJ placed 4/27 - continue as poor vascular access and pt clinically slow to improve    Restraints were deemed necessary to prevent removal of life-sustaining devices [  ] YES   [    x]  NO    Disposition: ICU care    Goals of Care: Full

## 2019-04-29 NOTE — SWALLOW BEDSIDE ASSESSMENT ADULT - SLP PERTINENT HISTORY OF CURRENT PROBLEM
59 yr old female PMHx of MVP, chronic SDH, interstitial lung disease/pneumothorax, pulmonary nodules, meniere's, non hodgkins lymphoma (SCD/XRT/chemo at [facility] 2003), TIA, tachycardia, occipital neuralgia, and neuropathy.  She was recently admitted here to [place] acute rehab on 04/19/19.  RRT was called because pt was noted to have 2 bloody BMs on diaper, with blood clots.  Pt denies any abdominal pain, chest pain, but very anxious.  She was tachycardic to 120's, BP 90/60, O2 sat was 98%, and she was afebrile. Several recent hospitalizations. Had been seen by GI for rectal pain, found to have anal fissure, hemorrhoids, constipation and was placed on a bowel regimen. Had a KUB 4/12/19 No stool, no free air, no distended loops of small bowel, fibroids. Refused further workup.  She never had a colonoscopy.  Seen by SLP 4/20 with recommendations for Regular with Thin

## 2019-04-29 NOTE — PROGRESS NOTE ADULT - SUBJECTIVE AND OBJECTIVE BOX
CC: f/u for possible sepsis    Patient reports: no complaints today, no fever, no chills, still with a dry cough, no obvious bleeding, colonoscopy planned for 4/30    REVIEW OF SYSTEMS:  All other review of systems negative (Comprehensive ROS)    Antimicrobials Day #  :day 3  piperacillin/tazobactam IVPB. 3.375 Gram(s) IV Intermittent every 8 hours  vancomycin  IVPB 1000 milliGRAM(s) IV Intermittent every 12 hours    Other Medications Reviewed    T(F): 97.8 (04-29-19 @ 08:00), Max: 98.1 (04-28-19 @ 22:00)  HR: 85 (04-29-19 @ 16:00)  BP: 110/62 (04-29-19 @ 16:00)  RR: 33 (04-29-19 @ 16:00)  SpO2: 100% (04-29-19 @ 16:00)  Wt(kg): --    PHYSICAL EXAM:  General: alert, no acute distress  Eyes:  anicteric, no conjunctival injection, no discharge  Oropharynx: no lesions or injection 	  Neck: supple, without adenopathy  Lungs: clear to auscultation, poor inspiratory effert  Heart: regular rate and rhythm; no murmur, rubs or gallops  Abdomen: soft, nondistended, nontender, without mass or organomegaly  Skin: no lesions  Extremities: no clubbing, cyanosis, or edema  Neurologic: alert, oriented, moves all extremities    LAB RESULTS:                        11.1   14.0  )-----------( 229      ( 29 Apr 2019 15:12 )             34.1     04-29    141  |  100  |  6<L>  ----------------------------<  110<H>  3.3<L>   |  36<H>  |  0.47<L>    Ca    9.6      29 Apr 2019 15:12  Phos  2.4     04-29  Mg     2.2     04-29    PC .56      MICROBIOLOGY:  RECENT CULTURES:  04-27 @ 08:10 .Blood Blood     No growth to date.          RADIOLOGY REVIEWED:    < from: Xray Chest 1 View- PORTABLE-Routine (04.29.19 @ 06:52) >  INTERPRETATION:  Single view chest    History shortness of breath, interstitial lung disease    Comparison yesterday    The central line remains in place tip centered over the upper right   atrium. There is moderate patchy interstitial opacity, unchanged without   lobar consolidation or layering effusion. There is mild cardiomegaly.   There is no pneumothorax.

## 2019-04-29 NOTE — PHYSICAL THERAPY INITIAL EVALUATION ADULT - ADDITIONAL COMMENTS
patient lives in private home with spouse and daughter, 4 steps to enter no railing, bedrooom/full bath on first floor, patient recently declined and was ambulating very short distances at home with RW

## 2019-04-29 NOTE — SWALLOW BEDSIDE ASSESSMENT ADULT - SWALLOW EVAL: DIAGNOSIS
Oral and pharyngeal stages appears WFL upon clinical assessment.  No overt signs/symptoms of penetration or aspiration observed across consistencies.  Pt reporting fatigue and poor endurance during meals, therefore will continue to recommend Soft with Thin liquids

## 2019-04-29 NOTE — SWALLOW BEDSIDE ASSESSMENT ADULT - ASR SWALLOW RECOMMEND DIAG
Reassess at bedside; Consider MBS for objective view of pharyngeal stage if silent aspiration is of concern

## 2019-04-29 NOTE — PROGRESS NOTE ADULT - SUBJECTIVE AND OBJECTIVE BOX
Follow-up Pulmonary Progress Note  Chief Complaint : Other ulcerative colitis with rectal bleeding      patient continues to have productive cough  This am and overnight pt had episodes of asymptomatic bardycardia - sinus  now appears to be NSR    Pt feels better, less sob,   cough continues        Allergies :IV Contrast (Anaphylaxis)  shellfish. (Anaphylaxis)      PAST MEDICAL & SURGICAL HISTORY:  Interstitial lung disease: on home o2 prn  NHL (non-Hodgkin's lymphoma): Agem 43 sp chemo/rt/stem cell  Transient cerebral ischemia, unspecified type  Mitral prolapse  History of tonsillectomy  History of appendectomy      Medications:  MEDICATIONS  (STANDING):  ALBUTerol/ipratropium for Nebulization 3 milliLiter(s) Nebulizer every 6 hours  ALPRAZolam 0.5 milliGRAM(s) Oral three times a day  buDESOnide   0.5 milliGRAM(s) Respule 0.5 milliGRAM(s) Inhalation two times a day  chlorhexidine 4% Liquid 1 Application(s) Topical every 12 hours  hydrocortisone sodium succinate Injectable 50 milliGRAM(s) IV Push daily  hydrocortisone sodium succinate Injectable   IV Push   lactated ringers. 1000 milliLiter(s) (125 mL/Hr) IV Continuous <Continuous>  lidocaine   Patch 1 Patch Transdermal daily  multivitamin 1 Tablet(s) Oral daily  pantoprazole  Injectable 40 milliGRAM(s) IV Push every 12 hours  piperacillin/tazobactam IVPB. 3.375 Gram(s) IV Intermittent every 8 hours  potassium chloride    Tablet ER 40 milliEquivalent(s) Oral every 4 hours  tiotropium 18 MICROgram(s) Capsule 1 Capsule(s) Inhalation daily  valproate sodium IVPB 500 milliGRAM(s) IV Intermittent every 12 hours  vancomycin  IVPB 1000 milliGRAM(s) IV Intermittent every 12 hours  zinc oxide 20% Ointment 1 Application(s) Topical daily    MEDICATIONS  (PRN):  acetaminophen   Tablet .. 650 milliGRAM(s) Oral every 6 hours PRN Temp greater or equal to 38C (100.4F), Mild Pain (1 - 3)  ALBUTerol/ipratropium for Nebulization 3 milliLiter(s) Nebulizer every 6 hours PRN Shortness of Breath and/or Wheezing  guaiFENesin    Syrup 100 milliGRAM(s) Oral every 6 hours PRN Cough  haloperidol    Injectable 2.5 milliGRAM(s) IntraMuscular once PRN agitation  ondansetron Injectable 4 milliGRAM(s) IV Push every 6 hours PRN Nausea and/or Vomiting  sodium chloride 0.9% lock flush 10 milliLiter(s) IV Push every 1 hour PRN Pre/post blood products, medications, blood draw, and to maintain line patency      LABS:                        10.5   15.0  )-----------( 195      ( 29 Apr 2019 04:54 )             32.0     04-29    144  |  102  |  7   ----------------------------<  152<H>  2.7<LL>   |  35<H>  |  0.57    Ca    9.6      29 Apr 2019 04:54  Phos  2.6     04-29  Mg     1.8     04-29                  Procalcitonin, Serum: 0.52 ng/mL (04-29-19 @ 04:54)  Procalcitonin, Serum: 0.60 ng/mL (04-27-19 @ 08:09)      H/H Trend  04-29-19 @ 04:54   -  10.5<L> / 32.0<L>  04-28-19 @ 11:35   -  8.1<L> / 24.8<L>  04-28-19 @ 06:20   -  8.2<L> / 25.0<L>  04-28-19 @ 05:30   -  8.4<L> / 25.9<L>  04-27-19 @ 05:08   -  12.0 / 35.4  04-26-19 @ 18:40   -  11.6 / 34.9      CULTURES: (if applicable)    Culture - Blood (collected 04-27-19 @ 08:10)  Source: .Blood Blood  Preliminary Report (04-28-19 @ 14:01):    No growth to date.            CAPILLARY BLOOD GLUCOSE          RADIOLOGY  CXR:  < from: Xray Chest 1 View- PORTABLE-Routine (04.29.19 @ 06:52) >  The central line remains in place tip centered over the upper right   atrium. There is moderate patchy interstitial opacity, unchanged without   lobar consolidation or layering effusion. There is mild cardiomegaly.   There is no pneumothorax.    < end of copied text >      VITALS:  T(C): 36.6 (04-29-19 @ 08:00), Max: 36.7 (04-28-19 @ 09:00)  T(F): 97.8 (04-29-19 @ 08:00), Max: 98.1 (04-28-19 @ 22:00)  HR: 71 (04-29-19 @ 08:53) (39 - 133)  BP: 139/57 (04-29-19 @ 08:00) (74/43 - 139/57)  BP(mean): 82 (04-29-19 @ 08:00) (52 - 106)  ABP: --  ABP(mean): --  RR: 18 (04-29-19 @ 08:00) (18 - 49)  SpO2: 100% (04-29-19 @ 08:53) (97% - 100%)  CVP(mm Hg): --  CVP(cm H2O): --    Ins and Outs     04-28-19 @ 07:01  -  04-29-19 @ 07:00  --------------------------------------------------------  IN: 5366 mL / OUT: 0 mL / NET: 5366 mL    04-29-19 @ 07:01  -  04-29-19 @ 08:57  --------------------------------------------------------  IN: 670 mL / OUT: 0 mL / NET: 670 mL

## 2019-04-30 LAB
ANION GAP SERPL CALC-SCNC: 8 MMOL/L — SIGNIFICANT CHANGE UP (ref 5–17)
BUN SERPL-MCNC: 4 MG/DL — LOW (ref 7–23)
CALCIUM SERPL-MCNC: 9.1 MG/DL — SIGNIFICANT CHANGE UP (ref 8.4–10.5)
CHLORIDE SERPL-SCNC: 103 MMOL/L — SIGNIFICANT CHANGE UP (ref 96–108)
CO2 SERPL-SCNC: 32 MMOL/L — HIGH (ref 22–31)
CREAT SERPL-MCNC: 0.53 MG/DL — SIGNIFICANT CHANGE UP (ref 0.5–1.3)
GLUCOSE SERPL-MCNC: 90 MG/DL — SIGNIFICANT CHANGE UP (ref 70–99)
HCT VFR BLD CALC: 32.5 % — LOW (ref 34.5–45)
HGB BLD-MCNC: 11.2 G/DL — LOW (ref 11.5–15.5)
MAGNESIUM SERPL-MCNC: 2.5 MG/DL — SIGNIFICANT CHANGE UP (ref 1.6–2.6)
MCHC RBC-ENTMCNC: 31.7 PG — SIGNIFICANT CHANGE UP (ref 27–34)
MCHC RBC-ENTMCNC: 34.6 GM/DL — SIGNIFICANT CHANGE UP (ref 32–36)
MCV RBC AUTO: 91.7 FL — SIGNIFICANT CHANGE UP (ref 80–100)
PHOSPHATE SERPL-MCNC: 4.6 MG/DL — HIGH (ref 2.5–4.5)
PLATELET # BLD AUTO: 224 K/UL — SIGNIFICANT CHANGE UP (ref 150–400)
POTASSIUM SERPL-MCNC: 3.7 MMOL/L — SIGNIFICANT CHANGE UP (ref 3.5–5.3)
POTASSIUM SERPL-SCNC: 3.7 MMOL/L — SIGNIFICANT CHANGE UP (ref 3.5–5.3)
RBC # BLD: 3.54 M/UL — LOW (ref 3.8–5.2)
RBC # FLD: 14.3 % — SIGNIFICANT CHANGE UP (ref 10.3–14.5)
SODIUM SERPL-SCNC: 143 MMOL/L — SIGNIFICANT CHANGE UP (ref 135–145)
WBC # BLD: 12.4 K/UL — HIGH (ref 3.8–10.5)
WBC # FLD AUTO: 12.4 K/UL — HIGH (ref 3.8–10.5)

## 2019-04-30 PROCEDURE — 99223 1ST HOSP IP/OBS HIGH 75: CPT

## 2019-04-30 RX ORDER — SODIUM CHLORIDE 9 MG/ML
1000 INJECTION INTRAMUSCULAR; INTRAVENOUS; SUBCUTANEOUS
Qty: 0 | Refills: 0 | Status: DISCONTINUED | OUTPATIENT
Start: 2019-04-30 | End: 2019-05-01

## 2019-04-30 RX ADMIN — SODIUM CHLORIDE 75 MILLILITER(S): 9 INJECTION, SOLUTION INTRAVENOUS at 00:11

## 2019-04-30 RX ADMIN — Medication 650 MILLIGRAM(S): at 03:11

## 2019-04-30 RX ADMIN — ZINC OXIDE 1 APPLICATION(S): 200 OINTMENT TOPICAL at 13:36

## 2019-04-30 RX ADMIN — Medication 3 MILLILITER(S): at 03:57

## 2019-04-30 RX ADMIN — Medication 500 MILLIGRAM(S): at 17:12

## 2019-04-30 RX ADMIN — Medication 650 MILLIGRAM(S): at 03:57

## 2019-04-30 RX ADMIN — Medication 250 MILLIGRAM(S): at 17:10

## 2019-04-30 RX ADMIN — Medication 500 MILLIGRAM(S): at 05:04

## 2019-04-30 RX ADMIN — PIPERACILLIN AND TAZOBACTAM 25 GRAM(S): 4; .5 INJECTION, POWDER, LYOPHILIZED, FOR SOLUTION INTRAVENOUS at 13:35

## 2019-04-30 RX ADMIN — SODIUM CHLORIDE 75 MILLILITER(S): 9 INJECTION, SOLUTION INTRAVENOUS at 13:33

## 2019-04-30 RX ADMIN — SODIUM CHLORIDE 75 MILLILITER(S): 9 INJECTION INTRAMUSCULAR; INTRAVENOUS; SUBCUTANEOUS at 15:24

## 2019-04-30 RX ADMIN — Medication 50 MILLIGRAM(S): at 05:03

## 2019-04-30 RX ADMIN — Medication 1000 MILLILITER(S): at 05:05

## 2019-04-30 RX ADMIN — CHLORHEXIDINE GLUCONATE 1 APPLICATION(S): 213 SOLUTION TOPICAL at 05:05

## 2019-04-30 RX ADMIN — Medication 100 MILLIGRAM(S): at 06:17

## 2019-04-30 RX ADMIN — PANTOPRAZOLE SODIUM 40 MILLIGRAM(S): 20 TABLET, DELAYED RELEASE ORAL at 05:03

## 2019-04-30 RX ADMIN — PANTOPRAZOLE SODIUM 40 MILLIGRAM(S): 20 TABLET, DELAYED RELEASE ORAL at 17:10

## 2019-04-30 RX ADMIN — PIPERACILLIN AND TAZOBACTAM 25 GRAM(S): 4; .5 INJECTION, POWDER, LYOPHILIZED, FOR SOLUTION INTRAVENOUS at 21:40

## 2019-04-30 RX ADMIN — CHLORHEXIDINE GLUCONATE 1 APPLICATION(S): 213 SOLUTION TOPICAL at 17:10

## 2019-04-30 RX ADMIN — Medication 250 MILLIGRAM(S): at 05:03

## 2019-04-30 RX ADMIN — Medication 3 MILLILITER(S): at 13:06

## 2019-04-30 RX ADMIN — PIPERACILLIN AND TAZOBACTAM 25 GRAM(S): 4; .5 INJECTION, POWDER, LYOPHILIZED, FOR SOLUTION INTRAVENOUS at 05:04

## 2019-04-30 NOTE — PROVIDER CONTACT NOTE (OTHER) - ACTION/TREATMENT ORDERED:
Dr. Sr to speak to patient.
EKG ordered and done - no changes noted.  Discomfort has subsided.  Medicated with robitussin for cough

## 2019-04-30 NOTE — PROGRESS NOTE ADULT - SUBJECTIVE AND OBJECTIVE BOX
CC: f/u for possible sepsis    Patient reports just returned from colonoscopy- hemorrhoids; calm, alert     REVIEW OF SYSTEMS:  All other review of systems negative (Comprehensive ROS)    Antimicrobials Day # 4  piperacillin/tazobactam IVPB. 3.375 Gram(s) IV Intermittent every 8 hours  vancomycin  IVPB 1000 milliGRAM(s) IV Intermittent every 12 hours    Other Medications Reviewed    Vital Signs Last 24 Hrs  T(F): 97.5 (30 Apr 2019 16:40), Max: 97.9 (30 Apr 2019 05:00)  HR: 93 (30 Apr 2019 16:40) (72 - 111)  BP: 120/75 (30 Apr 2019 16:40) (94/44 - 123/85)  BP(mean): 88 (30 Apr 2019 16:40) (59 - 95)  RR: 20 (30 Apr 2019 16:40) (14 - 52)  SpO2: 100% (30 Apr 2019 16:40) (95% - 100%)    PHYSICAL EXAM:  General: alert, no acute distress  Eyes:  anicteric, no conjunctival injection, no discharge  Oropharynx: no lesions or injection 	  Neck: supple, without adenopathy  Lungs: clear to auscultation, poor inspiratory effort  Heart: regular rate and rhythm; no murmur, rubs or gallops  Abdomen: soft, nondistended, nontender, without mass or organomegaly  Skin: no lesions  Extremities: no clubbing, cyanosis, or edema  Neurologic: alert, moves all extremities    LAB RESULTS:                        11.2   12.4  )-----------( 224      ( 30 Apr 2019 05:10 )             32.5   04-30    143  |  103  |  4<L>  ----------------------------<  90  3.7   |  32<H>  |  0.53    Ca    9.1      30 Apr 2019 05:10  Phos  4.6     04-30  Mg     2.5     04-30    Trend Procalcitonin  04-29-19 @ 04:54   -  0.52<H>  04-27-19 @ 08:09   -  0.60<H>    MICROBIOLOGY:  RECENT CULTURES:  04-27 @ 08:10 .Blood Blood     No growth to date.    RADIOLOGY REVIEWED:  Xray Chest 1 View- PORTABLE-Routine (04.29.19 @ 06:52) >  The central line remains in place tip centered over the upper right   atrium. There is moderate patchy interstitial opacity, unchanged without   lobar consolidation or layering effusion. There is mild cardiomegaly.   There is no pneumothorax.

## 2019-04-30 NOTE — PROGRESS NOTE ADULT - SUBJECTIVE AND OBJECTIVE BOX
Follow-up Critical Care Progress Note  Chief Complaint : Other ulcerative colitis with rectal bleeding    Pt feeling better  less sob, cp, palp, nv  cough continues, but improved      Allergies :IV Contrast (Anaphylaxis)  shellfish. (Anaphylaxis)      PAST MEDICAL & SURGICAL HISTORY:  Interstitial lung disease: on home o2 prn  NHL (non-Hodgkin's lymphoma): Agem 43 sp chemo/rt/stem cell  Transient cerebral ischemia, unspecified type  Mitral prolapse  History of tonsillectomy  History of appendectomy      Medications:  MEDICATIONS  (STANDING):  ALBUTerol/ipratropium for Nebulization 3 milliLiter(s) Nebulizer every 6 hours  ALPRAZolam 0.5 milliGRAM(s) Oral three times a day  buDESOnide   0.5 milliGRAM(s) Respule 0.5 milliGRAM(s) Inhalation two times a day  chlorhexidine 4% Liquid 1 Application(s) Topical every 12 hours  hydrocortisone sodium succinate Injectable   IV Push   lactated ringers. 1000 milliLiter(s) (75 mL/Hr) IV Continuous <Continuous>  lidocaine   Patch 1 Patch Transdermal daily  multivitamin 1 Tablet(s) Oral daily  pantoprazole  Injectable 40 milliGRAM(s) IV Push every 12 hours  piperacillin/tazobactam IVPB. 3.375 Gram(s) IV Intermittent every 8 hours  tiotropium 18 MICROgram(s) Capsule 1 Capsule(s) Inhalation daily  valproic acid 500 milliGRAM(s) Oral every 12 hours  vancomycin  IVPB 1000 milliGRAM(s) IV Intermittent every 12 hours  zinc oxide 20% Ointment 1 Application(s) Topical daily    MEDICATIONS  (PRN):  acetaminophen   Tablet .. 650 milliGRAM(s) Oral every 6 hours PRN Temp greater or equal to 38C (100.4F), Mild Pain (1 - 3)  ALBUTerol/ipratropium for Nebulization 3 milliLiter(s) Nebulizer every 6 hours PRN Shortness of Breath and/or Wheezing  guaiFENesin    Syrup 100 milliGRAM(s) Oral every 6 hours PRN Cough  haloperidol    Injectable 2.5 milliGRAM(s) IntraMuscular once PRN agitation  ondansetron Injectable 4 milliGRAM(s) IV Push every 6 hours PRN Nausea and/or Vomiting  sodium chloride 0.9% lock flush 10 milliLiter(s) IV Push every 1 hour PRN Pre/post blood products, medications, blood draw, and to maintain line patency      LABS:                        11.2   12.4  )-----------( 224      ( 30 Apr 2019 05:10 )             32.5     04-30    143  |  103  |  4<L>  ----------------------------<  90  3.7   |  32<H>  |  0.53    Ca    9.1      30 Apr 2019 05:10  Phos  4.6     04-30  Mg     2.5     04-30      Procalcitonin, Serum: 0.52 ng/mL (04-29-19 @ 04:54)          CULTURES: (if applicable)    Culture - Blood (collected 04-27-19 @ 08:10)  Source: .Blood Blood  Preliminary Report (04-28-19 @ 14:01):    No growth to date.          VITALS:  T(C): 36.6 (04-30-19 @ 05:00), Max: 37.1 (04-29-19 @ 17:00)  T(F): 97.9 (04-30-19 @ 05:00), Max: 98.8 (04-29-19 @ 17:00)  HR: 89 (04-30-19 @ 06:00) (67 - 94)  BP: 108/68 (04-30-19 @ 06:00) (94/44 - 128/75)  BP(mean): 81 (04-30-19 @ 06:00) (59 - 102)  ABP: --  ABP(mean): --  RR: 24 (04-30-19 @ 06:00) (14 - 33)  SpO2: 96% (04-30-19 @ 06:00) (95% - 100%)  CVP(mm Hg): --  CVP(cm H2O): --    Ins and Outs     04-29-19 @ 07:01  -  04-30-19 @ 07:00  --------------------------------------------------------  IN: 5055 mL / OUT: 13 mL / NET: 5042 mL

## 2019-04-30 NOTE — PROGRESS NOTE ADULT - ASSESSMENT
Physical Examination:  GENERAL:               Alert, Oriented, No acute distress.    HEENT:                    No JVD, dry MM No stridor  PULM:                     Bilateral air entry, Clear to auscultation bilaterally, no significant sputum production, No Rales, Left Rhonchi, No Wheezing  CVS:                         S1, S2,  +Murmur  ABD:                        Soft, nondistended, nontender, normoactive bowel sounds,   EXT:                         No edema, nontender, No Cyanosis or Clubbing   Vascular:                Warm Extremities, Normal Capillary refill,   NEURO:                  lethargic, oriented, interactive,  follows commands  PSYC:                      Calm, + Insight.    Assessment:  59 yr old female PMHx of MVP, chronic SDH, interstitial lung disease/pneumothorax, pulmonary nodules, meniere's, non hodgkins lymphoma (SCD/XRT/chemo at [facility] [place], [place] 2003), TIA, tachycardia, occipital neuralgia, and neuropathy being admitted to the ICU for acute GI bleeding with hypotension.       1. Hypovolemic shock  with possible Septic component , Resolved   2. Left HCAP vs Aspiration PNA, - Last day of Abx rod.   3. Acute blood loss anemia , h/h Stable  4. Gastrointestinal hemorrhage - awaiting colonscopy in AM  5. CVA - Left upper extremity spasm/paresis.   6. Seizures -   7. Chronic Intersitial lung disease -   8. Anxiety -  9. Sinus tachycardia New    Plan  d/c IVF after colonscopy  OOB to chair  monitor CBC daily  Last day of abx rod.  Last day of empiric steroids today  off Midodrine  CXR in am  GI f/u  PT    SCD for DVT prophylaxis.   ontinue xanax. Monitor for signs of benzo withdrawal.  S&S Eval for PNA   PMNR eval possible d/c planning for am to Rehab    Sedation & Analgesia:	n/a  Diet/Nutrition:		Advance as tolerated/oral  GI PPx:			PPI    DVT Ppx:		Venodynes	due to active bleeding       Activity:		              OOB chair  Head of Bed:               35-45 deg  Glycemic Control:        n/a    Lines: RIJ placed 4/27 - continue as poor vascular access and pt clinically slow to improve, will d/c once finished course of IV abx.     Restraints were deemed necessary to prevent removal of life-sustaining devices [  ] YES   [    x]  NO    Disposition: ICU care    Goals of Care: Full

## 2019-04-30 NOTE — PROGRESS NOTE ADULT - ASSESSMENT
60 yo F, remote lymphoma, pulm nodules, ILD, chronic b/l SDH, prolonged hosp since last month for neuro decompensation- new onset Sz, cerebral ischemia, as well tachycardia, underlying chronic lung dz.    She was briefly at rehab, only to require admission for rectal bleed- mult Units PRBCs, ICU monitoring, pressors- covered for sepsis    Afebrile, but leukocytosis and elevated PCT.    CT with basilar infiltrates, changes on CXR, might question pneumonia, but difficult to distinguish from chronic, underlying lung dz  Bld Cxs negative thus far  Remains afebrile, alert, WBC moderating  PCT lower  Hemodynamically stable  No sign of uncontrolled infection    Plan:  Vanco and Zosyn empiric, ?pneumonia, limit to another 24 hrs  Follow temps and CBC/diff

## 2019-04-30 NOTE — CONSULT NOTE ADULT - ATTENDING COMMENTS
Patient examined and chart reviewed, case  discussed with multiple staff members, including ICU attending, Neurology attending and ICU nurses.  Agree with the neurological exam and assessment as described above.

## 2019-04-30 NOTE — PROGRESS NOTE ADULT - ASSESSMENT
58 y/o female with multiple medical issues who was transferred from rehab this morning s/p rapid response due to onset of lower GI Bleed. H/H today 11.2/32.5.  Abdomen soft, non-tender. GI bleeding subsided. EGD done on 4/24/19, no source of UGIB identified. Patient prepping for colonoscopy today. She was only able to tolerate about 1500 cc of the movi prep and did have a bottle of mag citrate. Stool is liquid/yellow. I did explain to her the importance of completing the prep in order to get good visualization of the colon.

## 2019-04-30 NOTE — PROGRESS NOTE ADULT - SUBJECTIVE AND OBJECTIVE BOX
INTERVAL HPI/OVERNIGHT EVENTS:  HPI:    58 y/o female PMHx of MVP, chronic SDH, interstitial lung disease/pneumothorax, pulmonary nodules, meniere's, non hodgkins lymphoma (SCD/XRT/chemo at [facility] [place] ), TIA, tachycardia, occipital neuralgia, and neuropathy. GI following patient due to persistent GI bleed. Patient seen and examined at bedside in CCU. Denies nausea, vomiting, abdominal pain. No further episodes of GI bleeding noted    MEDICATIONS  (STANDING):  ALBUTerol/ipratropium for Nebulization 3 milliLiter(s) Nebulizer every 6 hours  ALPRAZolam 0.5 milliGRAM(s) Oral three times a day  buDESOnide   0.5 milliGRAM(s) Respule 0.5 milliGRAM(s) Inhalation two times a day  chlorhexidine 4% Liquid 1 Application(s) Topical every 12 hours  hydrocortisone sodium succinate Injectable   IV Push   lactated ringers. 1000 milliLiter(s) (75 mL/Hr) IV Continuous <Continuous>  lidocaine   Patch 1 Patch Transdermal daily  multivitamin 1 Tablet(s) Oral daily  pantoprazole  Injectable 40 milliGRAM(s) IV Push every 12 hours  piperacillin/tazobactam IVPB. 3.375 Gram(s) IV Intermittent every 8 hours  tiotropium 18 MICROgram(s) Capsule 1 Capsule(s) Inhalation daily  valproic acid 500 milliGRAM(s) Oral every 12 hours  vancomycin  IVPB 1000 milliGRAM(s) IV Intermittent every 12 hours  zinc oxide 20% Ointment 1 Application(s) Topical daily    MEDICATIONS  (PRN):  acetaminophen   Tablet .. 650 milliGRAM(s) Oral every 6 hours PRN Temp greater or equal to 38C (100.4F), Mild Pain (1 - 3)  ALBUTerol/ipratropium for Nebulization 3 milliLiter(s) Nebulizer every 6 hours PRN Shortness of Breath and/or Wheezing  guaiFENesin    Syrup 100 milliGRAM(s) Oral every 6 hours PRN Cough  haloperidol    Injectable 2.5 milliGRAM(s) IntraMuscular once PRN agitation  ondansetron Injectable 4 milliGRAM(s) IV Push every 6 hours PRN Nausea and/or Vomiting  sodium chloride 0.9% lock flush 10 milliLiter(s) IV Push every 1 hour PRN Pre/post blood products, medications, blood draw, and to maintain line patency      Allergies    IV Contrast (Anaphylaxis)  shellfish. (Anaphylaxis)    Intolerances        PHYSICAL EXAM:   Vital Signs:  Vital Signs Last 24 Hrs  T(C): 36.4 (2019 08:00), Max: 37.1 (2019 17:00)  T(F): 97.5 (2019 08:00), Max: 98.8 (2019 17:00)  HR: 90 (2019 10:00) (67 - 94)  BP: 114/80 (2019 10:00) (94/44 - 128/75)  BP(mean): 85 (2019 09:00) (59 - 92)  RR: 38 (2019 10:00) (14 - 38)  SpO2: 96% (2019 10:00) (95% - 100%)  Daily     Daily Weight in k.9 (2019 06:00)I&O's Summary    2019 07:01  -  2019 07:00  --------------------------------------------------------  IN: 5055 mL / OUT: 13 mL / NET: 5042 mL      GENERAL:  Appears stated age,  HEENT:  NC/AT,  conjunctivae clear and pink  CHEST:  Full & symmetric excursion, no increased effort, breath sounds clear  HEART:  Regular rhythm, S1, S2  ABDOMEN:  Soft, non-tender, non-distended, normoactive bowel sounds  EXTEREMITIES:  no edema, L side weakness  SKIN:  No rash, warm/dry  NEURO:  Alert, oriented, lethargic         LABS:                        11.2   12.4  )-----------( 224      ( 2019 05:10 )             32.5     04-30    143  |  103  |  4<L>  ----------------------------<  90  3.7   |  32<H>  |  0.53    Ca    9.1      2019 05:10  Phos  4.6     04-30  Mg     2.5     04-30

## 2019-05-01 LAB
ANION GAP SERPL CALC-SCNC: 8 MMOL/L — SIGNIFICANT CHANGE UP (ref 5–17)
BUN SERPL-MCNC: 7 MG/DL — SIGNIFICANT CHANGE UP (ref 7–23)
CALCIUM SERPL-MCNC: 9 MG/DL — SIGNIFICANT CHANGE UP (ref 8.4–10.5)
CHLORIDE SERPL-SCNC: 100 MMOL/L — SIGNIFICANT CHANGE UP (ref 96–108)
CO2 SERPL-SCNC: 29 MMOL/L — SIGNIFICANT CHANGE UP (ref 22–31)
CREAT SERPL-MCNC: 0.72 MG/DL — SIGNIFICANT CHANGE UP (ref 0.5–1.3)
GLUCOSE SERPL-MCNC: 105 MG/DL — HIGH (ref 70–99)
HCT VFR BLD CALC: 38.4 % — SIGNIFICANT CHANGE UP (ref 34.5–45)
HGB BLD-MCNC: 12.3 G/DL — SIGNIFICANT CHANGE UP (ref 11.5–15.5)
MAGNESIUM SERPL-MCNC: 2 MG/DL — SIGNIFICANT CHANGE UP (ref 1.6–2.6)
MCHC RBC-ENTMCNC: 30 PG — SIGNIFICANT CHANGE UP (ref 27–34)
MCHC RBC-ENTMCNC: 32.1 GM/DL — SIGNIFICANT CHANGE UP (ref 32–36)
MCV RBC AUTO: 93.3 FL — SIGNIFICANT CHANGE UP (ref 80–100)
PHOSPHATE SERPL-MCNC: 3.1 MG/DL — SIGNIFICANT CHANGE UP (ref 2.5–4.5)
PLATELET # BLD AUTO: 295 K/UL — SIGNIFICANT CHANGE UP (ref 150–400)
POTASSIUM SERPL-MCNC: 3 MMOL/L — LOW (ref 3.5–5.3)
POTASSIUM SERPL-SCNC: 3 MMOL/L — LOW (ref 3.5–5.3)
RBC # BLD: 4.11 M/UL — SIGNIFICANT CHANGE UP (ref 3.8–5.2)
RBC # FLD: 14.4 % — SIGNIFICANT CHANGE UP (ref 10.3–14.5)
SODIUM SERPL-SCNC: 137 MMOL/L — SIGNIFICANT CHANGE UP (ref 135–145)
WBC # BLD: 11 K/UL — HIGH (ref 3.8–10.5)
WBC # FLD AUTO: 11 K/UL — HIGH (ref 3.8–10.5)

## 2019-05-01 RX ORDER — ALPRAZOLAM 0.25 MG
0.25 TABLET ORAL ONCE
Qty: 0 | Refills: 0 | Status: DISCONTINUED | OUTPATIENT
Start: 2019-05-01 | End: 2019-05-01

## 2019-05-01 RX ORDER — POTASSIUM CHLORIDE 20 MEQ
40 PACKET (EA) ORAL ONCE
Qty: 0 | Refills: 0 | Status: COMPLETED | OUTPATIENT
Start: 2019-05-01 | End: 2019-05-01

## 2019-05-01 RX ORDER — ALPRAZOLAM 0.25 MG
0.5 TABLET ORAL ONCE
Qty: 0 | Refills: 0 | Status: DISCONTINUED | OUTPATIENT
Start: 2019-05-01 | End: 2019-05-01

## 2019-05-01 RX ORDER — POTASSIUM CHLORIDE 20 MEQ
10 PACKET (EA) ORAL ONCE
Qty: 0 | Refills: 0 | Status: COMPLETED | OUTPATIENT
Start: 2019-05-01 | End: 2019-05-01

## 2019-05-01 RX ADMIN — PIPERACILLIN AND TAZOBACTAM 25 GRAM(S): 4; .5 INJECTION, POWDER, LYOPHILIZED, FOR SOLUTION INTRAVENOUS at 21:31

## 2019-05-01 RX ADMIN — Medication 1 TABLET(S): at 12:34

## 2019-05-01 RX ADMIN — ZINC OXIDE 1 APPLICATION(S): 200 OINTMENT TOPICAL at 12:37

## 2019-05-01 RX ADMIN — Medication 250 MILLIGRAM(S): at 17:43

## 2019-05-01 RX ADMIN — PIPERACILLIN AND TAZOBACTAM 25 GRAM(S): 4; .5 INJECTION, POWDER, LYOPHILIZED, FOR SOLUTION INTRAVENOUS at 13:42

## 2019-05-01 RX ADMIN — PANTOPRAZOLE SODIUM 40 MILLIGRAM(S): 20 TABLET, DELAYED RELEASE ORAL at 17:43

## 2019-05-01 RX ADMIN — TIOTROPIUM BROMIDE 1 CAPSULE(S): 18 CAPSULE ORAL; RESPIRATORY (INHALATION) at 09:08

## 2019-05-01 RX ADMIN — CHLORHEXIDINE GLUCONATE 1 APPLICATION(S): 213 SOLUTION TOPICAL at 17:44

## 2019-05-01 RX ADMIN — Medication 0.5 MILLIGRAM(S): at 09:08

## 2019-05-01 RX ADMIN — PANTOPRAZOLE SODIUM 40 MILLIGRAM(S): 20 TABLET, DELAYED RELEASE ORAL at 06:44

## 2019-05-01 RX ADMIN — Medication 500 MILLIGRAM(S): at 17:43

## 2019-05-01 RX ADMIN — Medication 0.25 MILLIGRAM(S): at 04:01

## 2019-05-01 RX ADMIN — Medication 100 MILLIEQUIVALENT(S): at 12:34

## 2019-05-01 RX ADMIN — Medication 40 MILLIEQUIVALENT(S): at 12:35

## 2019-05-01 RX ADMIN — CHLORHEXIDINE GLUCONATE 1 APPLICATION(S): 213 SOLUTION TOPICAL at 06:47

## 2019-05-01 RX ADMIN — Medication 250 MILLIGRAM(S): at 06:49

## 2019-05-01 RX ADMIN — Medication 25 MILLIGRAM(S): at 06:44

## 2019-05-01 RX ADMIN — Medication 3 MILLILITER(S): at 09:08

## 2019-05-01 RX ADMIN — Medication 500 MILLIGRAM(S): at 06:44

## 2019-05-01 RX ADMIN — ONDANSETRON 4 MILLIGRAM(S): 8 TABLET, FILM COATED ORAL at 16:31

## 2019-05-01 RX ADMIN — PIPERACILLIN AND TAZOBACTAM 25 GRAM(S): 4; .5 INJECTION, POWDER, LYOPHILIZED, FOR SOLUTION INTRAVENOUS at 06:44

## 2019-05-01 NOTE — PROGRESS NOTE ADULT - ASSESSMENT
58 yo F, remote lymphoma, pulm nodules, ILD, chronic b/l SDH, prolonged hosp since last month for neuro decompensation- new onset Sz, cerebral ischemia, as well tachycardia, underlying chronic lung dz.    She was briefly at rehab, only to require admission for rectal bleed- mult Units PRBCs, ICU monitoring, pressors- covered for sepsis    Afebrile, but leukocytosis and elevated PCT.    CT with basilar infiltrates, changes on CXR, might question pneumonia, but difficult to distinguish from chronic, underlying lung dz  Bld Cxs negative thus far  Remains afebrile, alert, WBC improved, PCT lower  Hemodynamically stable  H/H stable   No sign of uncontrolled infection    Plan:  Last day of Vanco and Zosyn   Follow temps and CBC/diff   Lower GIB as per GI

## 2019-05-01 NOTE — PROGRESS NOTE ADULT - ASSESSMENT
Physical Examination:  GENERAL:               Alert, Oriented, No acute distress.    HEENT:                    No JVD, dry MM No stridor  PULM:                     Bilateral air entry, Clear to auscultation bilaterally, no significant sputum production, No Rales, trace Rhonchi, No Wheezing  CVS:                         S1, S2,  +Murmur  ABD:                        Soft, nondistended, nontender, normoactive bowel sounds,   EXT:                         No edema, nontender, No Cyanosis or Clubbing   Vascular:                Warm Extremities, Normal Capillary refill,   NEURO:                  Alert , oriented, interactive,  follows commands  PSYC:                      Calm, + Insight.    Assessment:  59 yr old female PMHx of MVP, chronic SDH, interstitial lung disease/pneumothorax, pulmonary nodules, meniere's, non hodgkins lymphoma (SCD/XRT/chemo at [place] 2003), TIA, tachycardia, occipital neuralgia, and neuropathy being admitted to the ICU for acute GI bleeding with hypotension.       1. Hypovolemic shock  with possible Septic component , Resolved   2. Left HCAP vs Aspiration PNA, - finishing abx  3. Acute blood loss anemia , h/h Stable  4. Gastrointestinal hemorrhage -   5. CVA - Left upper extremity spasm/paresis.   6. Seizures - on valproic acid  7. Chronic Intersitial lung disease -   8. Anxiety -  9. Sinus tachycardia stable    Plan  d/c IVF   OOB to chair, PT  monitor CBC daily  finish course of abx  GI f/u  PT  Neuro eval - for next step for neurological workup     SCD for DVT prophylaxis.   ontinue xanax. Monitor for signs of benzo withdrawal.  S&S Eval for PNA   PMNR eval possible d/c planning for am to Rehab    Sedation & Analgesia:	n/a  Diet/Nutrition:		oral diet  GI PPx:			PPI    DVT Ppx:		Venodynes	due to active bleeding       Activity:		              OOB chair  Head of Bed:               35-45 deg  Glycemic Control:        n/a    Lines: RIJ placed 4/27 - continue as poor vascular access and pt clinically slow to improve, will d/c once finished course of IV abx.     will plan to remove today once IV access is obtained.     Restraints were deemed necessary to prevent removal of life-sustaining devices [  ] YES   [    x]  NO    Disposition: Transfer to [facility], overnight team d/w Hospitalist will d/w Hospitalist team.     Goals of Care: Full

## 2019-05-01 NOTE — PROGRESS NOTE ADULT - SUBJECTIVE AND OBJECTIVE BOX
Follow-up Critical Care Progress Note  Chief Complaint : Other ulcerative colitis with rectal bleeding      s/p colonoscopy yesterday no active source of infection identified  d/w Dr. Walton yesterday - state patient has no diagnosis for CVA and recommended Neuro consult and ? transfer for further evaluation     at this time pt feels well, comfortable, no cp, palp, n/v    Allergies :IV Contrast (Anaphylaxis)  shellfish. (Anaphylaxis)      PAST MEDICAL & SURGICAL HISTORY:  Interstitial lung disease: on home o2 prn  NHL (non-Hodgkin's lymphoma): Agem 43 sp chemo/rt/stem cell  Transient cerebral ischemia, unspecified type  Mitral prolapse  History of tonsillectomy  History of appendectomy      Medications:  MEDICATIONS  (STANDING):  ALBUTerol/ipratropium for Nebulization 3 milliLiter(s) Nebulizer every 6 hours  buDESOnide   0.5 milliGRAM(s) Respule 0.5 milliGRAM(s) Inhalation two times a day  chlorhexidine 4% Liquid 1 Application(s) Topical every 12 hours  lidocaine   Patch 1 Patch Transdermal daily  multivitamin 1 Tablet(s) Oral daily  pantoprazole  Injectable 40 milliGRAM(s) IV Push every 12 hours  piperacillin/tazobactam IVPB. 3.375 Gram(s) IV Intermittent every 8 hours  potassium chloride    Tablet ER 40 milliEquivalent(s) Oral once  potassium chloride  10 mEq/100 mL IVPB 10 milliEquivalent(s) IV Intermittent once  tiotropium 18 MICROgram(s) Capsule 1 Capsule(s) Inhalation daily  valproic acid 500 milliGRAM(s) Oral every 12 hours  vancomycin  IVPB 1000 milliGRAM(s) IV Intermittent every 12 hours  zinc oxide 20% Ointment 1 Application(s) Topical daily    MEDICATIONS  (PRN):  acetaminophen   Tablet .. 650 milliGRAM(s) Oral every 6 hours PRN Temp greater or equal to 38C (100.4F), Mild Pain (1 - 3)  ALBUTerol/ipratropium for Nebulization 3 milliLiter(s) Nebulizer every 6 hours PRN Shortness of Breath and/or Wheezing  guaiFENesin    Syrup 100 milliGRAM(s) Oral every 6 hours PRN Cough  haloperidol    Injectable 2.5 milliGRAM(s) IntraMuscular once PRN agitation  ondansetron Injectable 4 milliGRAM(s) IV Push every 6 hours PRN Nausea and/or Vomiting  sodium chloride 0.9% lock flush 10 milliLiter(s) IV Push every 1 hour PRN Pre/post blood products, medications, blood draw, and to maintain line patency      LABS:                      12.3   11.0  )-----------( 295      ( 01 May 2019 06:50 )             38.4     05-01    137  |  100  |  7   ----------------------------<  105<H>  3.0<L>   |  29  |  0.72    Ca    9.0      01 May 2019 06:50  Phos  3.1     05-01  Mg     2.0     05-01      H/H Trend  05-01-19 @ 06:50   -  12.3 / 38.4  04-30-19 @ 05:10   -  11.2<L> / 32.5<L>  04-29-19 @ 15:12   -  11.1<L> / 34.1<L>  04-29-19 @ 04:54   -  10.5<L> / 32.0<L>  04-28-19 @ 11:35   -  8.1<L> / 24.8<L>                Procalcitonin, Serum: 0.52 ng/mL (04-29-19 @ 04:54)          CULTURES: (if applicable)    Culture - Blood (collected 04-27-19 @ 08:10)  Source: .Blood Blood  Preliminary Report (04-28-19 @ 14:01):    No growth to date.        VITALS:  T(C): 36.8 (05-01-19 @ 05:00), Max: 36.8 (05-01-19 @ 05:00)  T(F): 98.3 (05-01-19 @ 05:00), Max: 98.3 (05-01-19 @ 05:00)  HR: 106 (05-01-19 @ 06:00) (76 - 111)  BP: 117/75 (05-01-19 @ 06:00) (95/62 - 123/85)  BP(mean): 84 (05-01-19 @ 06:00) (71 - 95)  ABP: --  ABP(mean): --  RR: 14 (05-01-19 @ 06:00) (12 - 52)  SpO2: 99% (05-01-19 @ 06:00) (92% - 100%)  CVP(mm Hg): --  CVP(cm H2O): --    Ins and Outs     04-30-19 @ 07:01  -  05-01-19 @ 07:00  --------------------------------------------------------  IN: 1957 mL / OUT: 0 mL / NET: 1957 mL

## 2019-05-01 NOTE — CONSULT NOTE ADULT - SUBJECTIVE AND OBJECTIVE BOX
Neurology consult    ROMIE [id number]     Patient is a 59y old  Female who presents with a chief complaint of Rectal bleed (01 May 2019 10:24)      HPI:  59 yr old female who was recently admitted here to [place] [facility] acute rehab on 04/19/19.  RRT was called early this AM because pt was noted to have 2 bloody BMs on diaper.  S/P CCU stay and GI w/u and now transferred from the CCU.    She was initially admitted to [place] on 3/13/19 with acute onset of b/l UE weakness associated with nausea, blurry vision, and HA. CT head showed chronic B/L frontal SDH. The rest of the workup was negative. CVA/TIA r/out.  Pt was discharged home but on 3/17 had apparent seizure.  She was admitted to [facility] and intubated and placed on depakote.  CTA H/N, MRI, EEG unremarkable.  ID Recommended LP for fever; family deferred.  Transferred to [facility] on 3/19/19.  MRI brain showed cerebral ischemia. The rest of the workup was negative, this including CSF studies. EEG neg seizures. Sukhdev had workup for PE and was neg. (Evaluated by lymphoma specialist/onco, PET 4/5/19 r/o recurrence of lymphoma. Increased signal at base of tongue to follow up outpt ENT). Additionally, course complicated with pneumothorax, pulmonary consulted, no interventions, self resolved.  Tachycardia - per pulm, related to chronic lung disease.  Had an echo 3/18/19 EF 60%, mod-severe MR, had MRI brain 3/29/19 cortically based restricted diffusion within R>L frontoparietal region as well as additional scattered small foci, may represent evolution of a recent ischemia vs improving encephalitis.   She was stabilized and d/bonnie here for rehab. (23 Apr 2019 05:31)          MEDICATIONS    acetaminophen   Tablet .. 650 milliGRAM(s) Oral every 6 hours PRN  ALBUTerol/ipratropium for Nebulization 3 milliLiter(s) Nebulizer every 6 hours  ALBUTerol/ipratropium for Nebulization 3 milliLiter(s) Nebulizer every 6 hours PRN  buDESOnide   0.5 milliGRAM(s) Respule 0.5 milliGRAM(s) Inhalation two times a day  chlorhexidine 4% Liquid 1 Application(s) Topical every 12 hours  guaiFENesin    Syrup 100 milliGRAM(s) Oral every 6 hours PRN  haloperidol    Injectable 2.5 milliGRAM(s) IntraMuscular once PRN  lidocaine   Patch 1 Patch Transdermal daily  multivitamin 1 Tablet(s) Oral daily  ondansetron Injectable 4 milliGRAM(s) IV Push every 6 hours PRN  pantoprazole  Injectable 40 milliGRAM(s) IV Push every 12 hours  piperacillin/tazobactam IVPB. 3.375 Gram(s) IV Intermittent every 8 hours  potassium chloride    Tablet ER 40 milliEquivalent(s) Oral once  potassium chloride  10 mEq/100 mL IVPB 10 milliEquivalent(s) IV Intermittent once  sodium chloride 0.9% lock flush 10 milliLiter(s) IV Push every 1 hour PRN  tiotropium 18 MICROgram(s) Capsule 1 Capsule(s) Inhalation daily  valproic acid 500 milliGRAM(s) Oral every 12 hours  vancomycin  IVPB 1000 milliGRAM(s) IV Intermittent every 12 hours  zinc oxide 20% Ointment 1 Application(s) Topical daily      PMH: Interstitial lung disease  NHL (non-Hodgkin's lymphoma)  Transient cerebral ischemia, unspecified type  Mitral prolapse  Pulmonary disease       PSH: History of tonsillectomy  History of appendectomy      Family history:   FAMILY HISTORY:  Family history of stroke  Family history of breast cancer: Mother      SOCIAL HISTORY:  No history of tobacco or alcohol use     Allergies    IV Contrast (Anaphylaxis)  shellfish. (Anaphylaxis)    Intolerances            Vital Signs Last 24 Hrs  T(C): 36.7 (01 May 2019 08:40), Max: 36.8 (01 May 2019 05:00)  T(F): 98.1 (01 May 2019 08:40), Max: 98.3 (01 May 2019 05:00)  HR: 91 (01 May 2019 09:12) (76 - 111)  BP: 122/70 (01 May 2019 08:40) (95/62 - 123/85)  BP(mean): 84 (01 May 2019 06:00) (71 - 95)  RR: 15 (01 May 2019 08:40) (12 - 52)  SpO2: 97% (01 May 2019 09:12) (92% - 100%)      On Neurological Examination:    Head: normocephalic Neck: supple no carotid bruits    Mental Status - Patient is mildly lethargic and dysarthric follows commands and not aphasic.     Cranial Nerves - PERRL, EOMI, VFF, facial asymmetry V and Xii mormal    Motor Exam : Left hemiparesis increased tone arm weaker than leg.      Sensory    Intact to light touch and pinprick     Reflexes:  left greater than right  plantars tend upgoing    Gait -  not tested.

## 2019-05-01 NOTE — PROGRESS NOTE ADULT - SUBJECTIVE AND OBJECTIVE BOX
CC: f/u for possible sepsis    Patient resting comfortably, no pain    REVIEW OF SYSTEMS:  All other review of systems negative (Comprehensive ROS)    Antimicrobials Day # 5  piperacillin/tazobactam IVPB. 3.375 Gram(s) IV Intermittent every 8 hours  vancomycin  IVPB 1000 milliGRAM(s) IV Intermittent every 12 hours    Other Medications Reviewed    Vital Signs Last 24 Hrs  T(F): 98.1 (01 May 2019 08:40), Max: 98.3 (01 May 2019 05:00)  HR: 91 (01 May 2019 09:12) (76 - 109)  BP: 122/70 (01 May 2019 08:40) (95/62 - 122/70)  BP(mean): 84 (01 May 2019 06:00) (71 - 91)  RR: 15 (01 May 2019 08:40) (12 - 29)  SpO2: 97% (01 May 2019 09:12) (92% - 100%)    PHYSICAL EXAM:  General: alert, no acute distress  Eyes:  anicteric, no conjunctival injection, no discharge  Oropharynx: no lesions or injection 	  Neck: supple, without adenopathy  Lungs: clear to auscultation, poor inspiratory effort  Heart: regular rate and rhythm; no murmur, rubs or gallops  Abdomen: soft, nondistended, nontender, without mass or organomegaly  Skin: no lesions  Extremities: no clubbing, cyanosis, or edema  Neurologic: alert, moves all extremities    LAB RESULTS:                        12.3   11.0  )-----------( 295      ( 01 May 2019 06:50 )             38.4   05-01    137  |  100  |  7   ----------------------------<  105<H>  3.0<L>   |  29  |  0.72    Ca    9.0      01 May 2019 06:50  Phos  3.1     05-01  Mg     2.0     05-01    Trend Procalcitonin  04-29-19 @ 04:54   -  0.52<H>  04-27-19 @ 08:09   -  0.60<H>    MICROBIOLOGY:  RECENT CULTURES:  04-27 @ 08:10 .Blood Blood     No growth to date.    RADIOLOGY REVIEWED:  Xray Chest 1 View- PORTABLE-Routine (04.29.19 @ 06:52) >  The central line remains in place tip centered over the upper right   atrium. There is moderate patchy interstitial opacity, unchanged without   lobar consolidation or layering effusion. There is mild cardiomegaly.   There is no pneumothorax.

## 2019-05-01 NOTE — PROGRESS NOTE ADULT - SUBJECTIVE AND OBJECTIVE BOX
INTERVAL HPI/OVERNIGHT EVENTS:  HPI:  60 y/o female PMHx of MVP, chronic SDH, interstitial lung disease/pneumothorax, pulmonary nodules, meniere's, non hodgkins lymphoma (SCD/XRT/chemo at [facility] [place] 2003), TIA, tachycardia, occipital neuralgia, and neuropathy. . Patient seen and examined at bedside in tele. She is s/p Colonoscopy done on 4/30/19, which showed hemorrhoids. Denies nausea, vomiting, abdominal pain. No further episodes of GI bleeding noted    MEDICATIONS  (STANDING):  ALBUTerol/ipratropium for Nebulization 3 milliLiter(s) Nebulizer every 6 hours  buDESOnide   0.5 milliGRAM(s) Respule 0.5 milliGRAM(s) Inhalation two times a day  chlorhexidine 4% Liquid 1 Application(s) Topical every 12 hours  lidocaine   Patch 1 Patch Transdermal daily  multivitamin 1 Tablet(s) Oral daily  pantoprazole  Injectable 40 milliGRAM(s) IV Push every 12 hours  piperacillin/tazobactam IVPB. 3.375 Gram(s) IV Intermittent every 8 hours  potassium chloride    Tablet ER 40 milliEquivalent(s) Oral once  potassium chloride  10 mEq/100 mL IVPB 10 milliEquivalent(s) IV Intermittent once  tiotropium 18 MICROgram(s) Capsule 1 Capsule(s) Inhalation daily  valproic acid 500 milliGRAM(s) Oral every 12 hours  vancomycin  IVPB 1000 milliGRAM(s) IV Intermittent every 12 hours  zinc oxide 20% Ointment 1 Application(s) Topical daily    MEDICATIONS  (PRN):  acetaminophen   Tablet .. 650 milliGRAM(s) Oral every 6 hours PRN Temp greater or equal to 38C (100.4F), Mild Pain (1 - 3)  ALBUTerol/ipratropium for Nebulization 3 milliLiter(s) Nebulizer every 6 hours PRN Shortness of Breath and/or Wheezing  guaiFENesin    Syrup 100 milliGRAM(s) Oral every 6 hours PRN Cough  haloperidol    Injectable 2.5 milliGRAM(s) IntraMuscular once PRN agitation  ondansetron Injectable 4 milliGRAM(s) IV Push every 6 hours PRN Nausea and/or Vomiting  sodium chloride 0.9% lock flush 10 milliLiter(s) IV Push every 1 hour PRN Pre/post blood products, medications, blood draw, and to maintain line patency      Allergies    IV Contrast (Anaphylaxis)  shellfish. (Anaphylaxis)    Intolerances        PHYSICAL EXAM:   Vital Signs:  Vital Signs Last 24 Hrs  T(C): 36.7 (01 May 2019 08:40), Max: 36.8 (01 May 2019 05:00)  T(F): 98.1 (01 May 2019 08:40), Max: 98.3 (01 May 2019 05:00)  HR: 91 (01 May 2019 09:12) (76 - 111)  BP: 122/70 (01 May 2019 08:40) (95/62 - 123/85)  BP(mean): 84 (01 May 2019 06:00) (71 - 95)  RR: 15 (01 May 2019 08:40) (12 - 52)  SpO2: 97% (01 May 2019 09:12) (92% - 100%)  Daily     Daily I&O's Summary    30 Apr 2019 07:01  -  01 May 2019 07:00  --------------------------------------------------------  IN: 1957 mL / OUT: 0 mL / NET: 1957 mL    GENERAL:  Appears stated age,  HEENT:  NC/AT,  conjunctivae clear and pink  CHEST:  Full & symmetric excursion, no increased effort, breath sounds clear  HEART:  Regular rhythm, S1, S2  ABDOMEN:  Soft, non-tender, non-distended, normoactive bowel sounds  EXTEREMITIES:  no edema, L side weakness  SKIN:  No rash, warm/dry  NEURO:  Alert, oriented, lethargic       LABS:                        12.3   11.0  )-----------( 295      ( 01 May 2019 06:50 )             38.4     05-01    137  |  100  |  7   ----------------------------<  105<H>  3.0<L>   |  29  |  0.72    Ca    9.0      01 May 2019 06:50  Phos  3.1     05-01  Mg     2.0     05-01

## 2019-05-01 NOTE — ANESTHESIA FOLLOW-UP NOTE - NSEVALATION_GEN_ALL_CORE
No apparent complications or complaints regarding anesthesia care at this time/All questions were answered
All questions were answered/No apparent complications or complaints regarding anesthesia care at this time

## 2019-05-01 NOTE — PROGRESS NOTE ADULT - ASSESSMENT
60 y/o female with multiple medical issues. H/H today 12.3/38.4.  Abdomen soft, non-tender. GI bleeding subsided. EGD done on 4/24/19, no source of UGIB identified. S/P Colonoscopy 4/30/19, hemorrhoids seen on exam.

## 2019-05-01 NOTE — CONSULT NOTE ADULT - ASSESSMENT
60 yo woman on BIU transferred to CCU for GI bleed.  She is now stable though she has as yet an undiagnosed neurological disorder s/p w/u at [facility].  DDx did include malignances, autoimmune, infectious, and inflammatory etiologies.  Patient has a h/o lymphoma.     REC:  Disposition either to rehab and then f/u at [facility] 5/17/19 or else attempt transfer back to [facility] at this juncture.  In any case she is stable  for transfer from neurological standpoint.
58 y/o female with multiple medical issues who was transferred from rehab this morning s/p rapid response due to onset of lower GI Bleed. H/H 9.9/31.1, was 11.2/34.2, could be due to dilutional component. Abdomen soft, non-tender. VSS.
58 yo F, remote lymphoma, pulm nodules, ILD, chronic b/l SDH, prolonged hosp since last month with neuro decompensation- new onset Sz, cerebral ischemia, as well tachycardia, underlying chronic lung dz.  She was briefly at rehab, only to require admission for rectal bleed- mult Units PRBCs, ICU monitoring, pressors.  Afebrile, but tachycardic, leukocytosis, which can be nonspecific in this setting.  CT with basilar infiltrates, changes on CXR, might question pneumonia, but difficult to distinguish from chronic, underlying lung dz.  Procal is elevated 0.6, more suggestive of sepsis.  Source however less clear.    Plan:  Will follow on current broad coverage- Vanco and Zosyn  Await Cx results  Follow temps and CBC/diff   Follow PCT level  D/w Dr Sr  D/w daughter at bedside
58 yo right handed female with recent right>left cerebral hemispheric process resulted in fluctuating spastic quadriparesis, with left hemisensory deficits and gait impairment and seizures. In the context of multiple medical complex co-morbidities. Extensive workup at [facility] included multiple brain imaging, PET scan, CT Abd pelvis, CSF analysis, multiple EEGs. No definitive diagnosis has been reached. Differential diagnosis options included ischemic vs inflammatory, infectious vs paraneoplastic etiologies. Patient remains physically fragile and unable to tolerate acute rehabilitation program requiring 3 hours of intensive daily therapy. From neurological standpoint I would suggest to repeat brain imaging, obtain paraneoplastic panel and consider repeating CSF analysis. Case discussed with ICU attending and Neurology attending, nursing staff. When investigations are completed and patient is medically stable, NEHA placement should be considered.
59 yr old female PMHx of MVP, chronic SDH, interstitial lung disease/pneumothorax, pulmonary nodules, meniere's, non hodgkins lymphoma (SCD/XRT/chemo at [facility] 2003), TIA, tachycardia, occipital neuralgia, and neuropathy being admitted to the ICU for acute GI bleeding with hypotension. Was seen by GI earlier today, want to perform endoscopy/colonoscopy tomorrow.     Assessment  1. Hypovolemic shock - IV hydration. Maintain blood pressure with MAP >65. Check Lactate level. Hold all BP medications.   2. Acute blood loss anemia - Will transfuse 2 unit PRBC, FFP and 1 unit platelets. Monitor hgb.   3. Gastrointestinal hemorrhage - GI follow up requested. Recommending CT angiogram, but patient with contrast allergy. Patient refusing contrast administration even if premedicated. PPI IV BID. NPO for now. Surgery consult pending.  4. CVA - Left upper extremity spasm/paresis. Holding aspirin for now given acute bleeding with shock   5. Seizures - Change medications to orally.   6. Intersitial lung disease - Pulmonary follow up. Cont. Inhalers for now. Currently does not need oxygen support.     Disposition: Admit patient to ICU   SCD for DVT prophylaxis.

## 2019-05-01 NOTE — CHART NOTE - NSCHARTNOTEFT_GEN_A_CORE
Called to evaluate patient for vascular access.  With aseptic technique 20g 6cm extended dwell peripheral catheter placed in right upperarm with sonographic guidance on first attempt.  Dark blood return noted from port, flushed with no resistance.  Biopatch and sterile dressing applied, patient tolerated procedure well.      RIJ TLC removed at beside without difficultly. Patient supine at time of removal. Sutures severed and patient instructed to hold breath while central line was extracted. Pressure applied with guaze dressing, hemostasis achieved, dressing secured.

## 2019-05-02 LAB
CULTURE RESULTS: SIGNIFICANT CHANGE UP
SPECIMEN SOURCE: SIGNIFICANT CHANGE UP

## 2019-05-02 PROCEDURE — 93010 ELECTROCARDIOGRAM REPORT: CPT

## 2019-05-02 PROCEDURE — 99233 SBSQ HOSP IP/OBS HIGH 50: CPT

## 2019-05-02 RX ORDER — ZINC OXIDE 200 MG/G
1 OINTMENT TOPICAL DAILY
Qty: 0 | Refills: 0 | Status: DISCONTINUED | OUTPATIENT
Start: 2019-05-02 | End: 2019-05-09

## 2019-05-02 RX ORDER — METOPROLOL TARTRATE 50 MG
25 TABLET ORAL
Qty: 0 | Refills: 0 | Status: DISCONTINUED | OUTPATIENT
Start: 2019-05-02 | End: 2019-05-09

## 2019-05-02 RX ORDER — POTASSIUM CHLORIDE 20 MEQ
40 PACKET (EA) ORAL ONCE
Qty: 0 | Refills: 0 | Status: COMPLETED | OUTPATIENT
Start: 2019-05-02 | End: 2019-05-02

## 2019-05-02 RX ADMIN — Medication 650 MILLIGRAM(S): at 21:00

## 2019-05-02 RX ADMIN — Medication 3 MILLILITER(S): at 09:17

## 2019-05-02 RX ADMIN — Medication 1 TABLET(S): at 12:30

## 2019-05-02 RX ADMIN — Medication 25 MILLIGRAM(S): at 10:26

## 2019-05-02 RX ADMIN — PANTOPRAZOLE SODIUM 40 MILLIGRAM(S): 20 TABLET, DELAYED RELEASE ORAL at 17:58

## 2019-05-02 RX ADMIN — Medication 3 MILLILITER(S): at 15:58

## 2019-05-02 RX ADMIN — Medication 500 MILLIGRAM(S): at 17:58

## 2019-05-02 RX ADMIN — ONDANSETRON 4 MILLIGRAM(S): 8 TABLET, FILM COATED ORAL at 10:25

## 2019-05-02 RX ADMIN — ZINC OXIDE 1 APPLICATION(S): 200 OINTMENT TOPICAL at 17:58

## 2019-05-02 RX ADMIN — Medication 650 MILLIGRAM(S): at 20:03

## 2019-05-02 RX ADMIN — Medication 0.5 MILLIGRAM(S): at 09:17

## 2019-05-02 RX ADMIN — TIOTROPIUM BROMIDE 1 CAPSULE(S): 18 CAPSULE ORAL; RESPIRATORY (INHALATION) at 09:17

## 2019-05-02 RX ADMIN — Medication 500 MILLIGRAM(S): at 06:09

## 2019-05-02 RX ADMIN — Medication 40 MILLIEQUIVALENT(S): at 12:30

## 2019-05-02 RX ADMIN — Medication 25 MILLIGRAM(S): at 17:58

## 2019-05-02 NOTE — CHART NOTE - NSCHARTNOTEFT_GEN_A_CORE
Nutrition Follow Up Note  Hospital Course (Per Electronic Medical Record):   Source: Medical Record [X] Patient [X]     Diet: Soft, Lacto-Ovo Vegetarian  Patient reports tolerating her diet , appetite reported improving "eatting the best I can" No GI distress noted. Patient noted with severe malnutrition was receiving po nutrition supplement 2/2 to same, however requests to discontinue . Encouraged increase consumption of protein.(meat/eggs)    Current Weight:(5/4) 123.8/56.2kg, noted with 3 lb weight change since admission. No edema noted.    Pertinent Medications: MEDICATIONS  (STANDING):  ALBUTerol/ipratropium for Nebulization 3 milliLiter(s) Nebulizer every 6 hours  buDESOnide   0.5 milliGRAM(s) Respule 0.5 milliGRAM(s) Inhalation two times a day  chlorhexidine 4% Liquid 1 Application(s) Topical every 12 hours  lidocaine   Patch 1 Patch Transdermal daily  metoprolol tartrate 25 milliGRAM(s) Oral two times a day  multivitamin 1 Tablet(s) Oral daily  pantoprazole  Injectable 40 milliGRAM(s) IV Push every 12 hours  potassium chloride    Tablet ER 40 milliEquivalent(s) Oral once  tiotropium 18 MICROgram(s) Capsule 1 Capsule(s) Inhalation daily  valproic acid 500 milliGRAM(s) Oral every 12 hours  zinc oxide 20% Ointment 1 Application(s) Topical daily    MEDICATIONS  (PRN):  acetaminophen   Tablet .. 650 milliGRAM(s) Oral every 6 hours PRN Temp greater or equal to 38C (100.4F), Mild Pain (1 - 3)  ALBUTerol/ipratropium for Nebulization 3 milliLiter(s) Nebulizer every 6 hours PRN Shortness of Breath and/or Wheezing  guaiFENesin    Syrup 100 milliGRAM(s) Oral every 6 hours PRN Cough  haloperidol    Injectable 2.5 milliGRAM(s) IntraMuscular once PRN agitation  ondansetron Injectable 4 milliGRAM(s) IV Push every 6 hours PRN Nausea and/or Vomiting  sodium chloride 0.9% lock flush 10 milliLiter(s) IV Push every 1 hour PRN Pre/post blood products, medications, blood draw, and to maintain line patency      Pertinent Labs:  05-01 Na137 mmol/L Glu 105 mg/dL<H> K+ 3.0 mmol/L<L> Cr  0.72 mg/dL BUN 7 mg/dL 05-01 Phos 3.1 mg/dL        Skin: Intact    Last BM: on (4/30)    Estimated Needs:   [X] No Change since Previous Assessment  [X] Recalculated:     Previous Nutrition Diagnosis: Severe Malnutrition    Nutrition Diagnosis is [X] Ongoing         New Nutrition Diagnosis: [X] Not Applicable      Interventions:   1. Recommend discontinue Ensure supplement, patient no longer receptive  to receiving it.      Monitoring & Evaluation: will monitor:  [X] Weights   [X] PO Intake   [X] Follow Up (Per Protocol)  [X] Tolerance to Diet Prescription       RD to follow as per Nutrition protocol  Ayana Grider RDN

## 2019-05-02 NOTE — PROGRESS NOTE ADULT - ASSESSMENT
Patient with complicated presentation of neurological syndrome in setting of lymphoma s/p extensive w/u at [facility] without a definite diagnosis.    REC:  rehab and f/u at [facility].  Neuro f/u only PRN at this time.

## 2019-05-02 NOTE — PROGRESS NOTE ADULT - ASSESSMENT
58 yo F, remote lymphoma, pulm nodules, ILD, chronic b/l SDH, prolonged hosp since March for neuro decompensation- new onset Sz, cerebral ischemia, as well tachycardia, underlying chronic lung dz.    She was briefly at rehab, only to require admission for rectal bleed- mult Units PRBCs, ICU monitoring, pressors- covered for sepsis    Afebrile, but leukocytosis and elevated PCT.    CT with basilar infiltrates, changes on CXR, might question pneumonia, but difficult to distinguish from chronic, underlying lung dz  Bld Cxs negative   Afebrile, alert, WBC improved, PCT lower  Hemodynamically stable  H/H stable   No sign of uncontrolled infection- completed course Vanco and Zosyn    Plan:  Observe off antibiotics.   Follow temps and CBC/diff   Lower GIB as per GI

## 2019-05-02 NOTE — PROGRESS NOTE ADULT - SUBJECTIVE AND OBJECTIVE BOX
Follow-up Critical Care Progress Note  Chief Complaint : Other ulcerative colitis with rectal bleeding      patient transferred to tele yesterday  complain of nausea  pt out of bed,  noted tachycardia on excertion  no sob, cough resolved  finish abx yesterday          Allergies :IV Contrast (Anaphylaxis)  shellfish. (Anaphylaxis)      PAST MEDICAL & SURGICAL HISTORY:  Interstitial lung disease: on home o2 prn  NHL (non-Hodgkin's lymphoma): Agem 43 sp chemo/rt/stem cell  Transient cerebral ischemia, unspecified type  Mitral prolapse  History of tonsillectomy  History of appendectomy      Medications:  MEDICATIONS  (STANDING):  ALBUTerol/ipratropium for Nebulization 3 milliLiter(s) Nebulizer every 6 hours  buDESOnide   0.5 milliGRAM(s) Respule 0.5 milliGRAM(s) Inhalation two times a day  chlorhexidine 4% Liquid 1 Application(s) Topical every 12 hours  lidocaine   Patch 1 Patch Transdermal daily  metoprolol tartrate 25 milliGRAM(s) Oral two times a day  multivitamin 1 Tablet(s) Oral daily  pantoprazole  Injectable 40 milliGRAM(s) IV Push every 12 hours  tiotropium 18 MICROgram(s) Capsule 1 Capsule(s) Inhalation daily  valproic acid 500 milliGRAM(s) Oral every 12 hours  zinc oxide 20% Ointment 1 Application(s) Topical daily    MEDICATIONS  (PRN):  acetaminophen   Tablet .. 650 milliGRAM(s) Oral every 6 hours PRN Temp greater or equal to 38C (100.4F), Mild Pain (1 - 3)  ALBUTerol/ipratropium for Nebulization 3 milliLiter(s) Nebulizer every 6 hours PRN Shortness of Breath and/or Wheezing  guaiFENesin    Syrup 100 milliGRAM(s) Oral every 6 hours PRN Cough  haloperidol    Injectable 2.5 milliGRAM(s) IntraMuscular once PRN agitation  ondansetron Injectable 4 milliGRAM(s) IV Push every 6 hours PRN Nausea and/or Vomiting  sodium chloride 0.9% lock flush 10 milliLiter(s) IV Push every 1 hour PRN Pre/post blood products, medications, blood draw, and to maintain line patency      LABS:                        12.3   11.0  )-----------( 295      ( 01 May 2019 06:50 )             38.4     05-01    137  |  100  |  7   ----------------------------<  105<H>  3.0<L>   |  29  |  0.72    Ca    9.0      01 May 2019 06:50  Phos  3.1     05-01  Mg     2.0     05-01    CULTURES: (if applicable)    Culture - Blood (collected 04-27-19 @ 08:10)  Source: .Blood Blood  Preliminary Report (04-28-19 @ 14:01):    No growth to date.        VITALS:  T(C): 36.9 (05-02-19 @ 07:34), Max: 37 (05-02-19 @ 05:18)  T(F): 98.5 (05-02-19 @ 07:34), Max: 98.6 (05-02-19 @ 05:18)  HR: 76 (05-02-19 @ 09:34) (76 - 106)  BP: 122/67 (05-02-19 @ 07:34) (100/66 - 122/67)  BP(mean): --  ABP: --  ABP(mean): --  RR: 16 (05-02-19 @ 07:34) (15 - 16)  SpO2: 98% (05-02-19 @ 09:34) (93% - 100%)  CVP(mm Hg): --  CVP(cm H2O): --    Ins and Outs     05-01-19 @ 07:01  -  05-02-19 @ 07:00  --------------------------------------------------------  IN: 820 mL / OUT: 0 mL / NET: 820 mL    05-02-19 @ 07:01  -  05-02-19 @ 10:08  --------------------------------------------------------  IN: 240 mL / OUT: 0 mL / NET: 240 mL

## 2019-05-02 NOTE — PROGRESS NOTE ADULT - SUBJECTIVE AND OBJECTIVE BOX
CC: f/u for sepsis, possible pneumonia    Patient reports feeling better in general, no bleeding, no SOB    REVIEW OF SYSTEMS:  All other review of systems negative (Comprehensive ROS)    Antimicrobials off  Medications Reviewed    T(F): 98.5 (05-02-19 @ 16:23), Max: 98.6 (05-02-19 @ 05:18)  HR: 89 (05-02-19 @ 16:23)  BP: 121/66 (05-02-19 @ 16:23)  RR: 16 (05-02-19 @ 16:23)  SpO2: 95% (05-02-19 @ 16:23)  Wt(kg): --    PHYSICAL EXAM:  General: alert, no acute distress  Eyes:  anicteric, no conjunctival injection, no discharge  Oropharynx: no lesions or injection 	  Neck: supple, without adenopathy  Lungs: clear to auscultation  Heart: regular rate and rhythm; no murmur, rubs or gallops  Abdomen: soft, nondistended, nontender, without mass or organomegaly  Skin: no lesions  Extremities: no clubbing, cyanosis, or edema  Neurologic: alert, oriented, moves all extremities    LAB RESULTS:                        12.3   11.0  )-----------( 295      ( 01 May 2019 06:50 )             38.4     05-01    137  |  100  |  7   ----------------------------<  105<H>  3.0<L>   |  29  |  0.72    Ca    9.0      01 May 2019 06:50  Phos  3.1     05-01  Mg     2.0     05-01    MICROBIOLOGY:  RECENT CULTURES:  04-27 @ 08:10 .Blood Blood     No growth to date.    RADIOLOGY REVIEWED:  Xray Chest 1 View- PORTABLE-Routine (04.29.19 @ 06:52) >  The central line remains in place tip centered over the upper right   atrium. There is moderate patchy interstitial opacity, unchanged without   lobar consolidation or layering effusion. There is mild cardiomegaly.   There is no pneumothorax.

## 2019-05-02 NOTE — PROGRESS NOTE ADULT - SUBJECTIVE AND OBJECTIVE BOX
Neurology Progress Note    Subjective & Objective:  Patient is stable depressed but alert and attentive to me without aphasia.  The left hemiparesis is unchanged.    Records from [facility] reviewed suggesting rehab and outpatient f/u scheduled for mid May.                  MEDICATIONS  (STANDING):  ALBUTerol/ipratropium for Nebulization 3 milliLiter(s) Nebulizer every 6 hours  buDESOnide   0.5 milliGRAM(s) Respule 0.5 milliGRAM(s) Inhalation two times a day  chlorhexidine 4% Liquid 1 Application(s) Topical every 12 hours  lidocaine   Patch 1 Patch Transdermal daily  multivitamin 1 Tablet(s) Oral daily  pantoprazole  Injectable 40 milliGRAM(s) IV Push every 12 hours  tiotropium 18 MICROgram(s) Capsule 1 Capsule(s) Inhalation daily  valproic acid 500 milliGRAM(s) Oral every 12 hours  zinc oxide 20% Ointment 1 Application(s) Topical daily    MEDICATIONS  (PRN):  acetaminophen   Tablet .. 650 milliGRAM(s) Oral every 6 hours PRN Temp greater or equal to 38C (100.4F), Mild Pain (1 - 3)  ALBUTerol/ipratropium for Nebulization 3 milliLiter(s) Nebulizer every 6 hours PRN Shortness of Breath and/or Wheezing  guaiFENesin    Syrup 100 milliGRAM(s) Oral every 6 hours PRN Cough  haloperidol    Injectable 2.5 milliGRAM(s) IntraMuscular once PRN agitation  ondansetron Injectable 4 milliGRAM(s) IV Push every 6 hours PRN Nausea and/or Vomiting  sodium chloride 0.9% lock flush 10 milliLiter(s) IV Push every 1 hour PRN Pre/post blood products, medications, blood draw, and to maintain line patency

## 2019-05-02 NOTE — PROGRESS NOTE ADULT - ASSESSMENT
59 yr old female PMHx of MVP, chronic SDH, interstitial lung disease/pneumothorax, pulmonary nodules, meniere's, non hodgkins lymphoma (SCD/XRT/chemo at [facility] 2003), TIA, tachycardia, occipital neuralgia, and neuropathy being admitted to the ICU for acute GI bleeding with hypotension    #Hypovolemic shock 2/2 GIB  resolved   #Left HCAP vs Aspiration PNA  resolved , off abx   #Acute blood loss anemia/GIB  h/h stable  GI following   #CVA   #Seizures - on valproic acid  #Chronic Intersitial lung disease - stable   #Sinus tachycardia- stable    Dispo: plan to go back to acute rehab per PT recommendations

## 2019-05-02 NOTE — PROGRESS NOTE ADULT - ASSESSMENT
Physical Examination:  GENERAL:               Alert, Oriented, No acute distress.    HEENT:                    No JVD, dry MM No stridor  PULM:                     Bilateral air entry, Clear to auscultation bilaterally, no significant sputum production, No Rales, trace Rhonchi, No Wheezing  CVS:                         S1, S2,  +Murmur  ABD:                        Soft, nondistended, nontender, normoactive bowel sounds,   EXT:                         No edema, nontender, No Cyanosis or Clubbing   Vascular:                Warm Extremities, Normal Capillary refill,   NEURO:                  Alert , oriented, interactive,  follows commands  PSYC:                      Calm, + Insight.    Assessment:  59 yr old female PMHx of MVP, chronic SDH, interstitial lung disease/pneumothorax, pulmonary nodules, meniere's, non hodgkins lymphoma (SCD/XRT/chemo at [facility] 2003), TIA, tachycardia, occipital neuralgia, and neuropathy being admitted to the ICU for acute GI bleeding with hypotension.       1. Hypovolemic shock  with possible Septic component , Resolved   2. Left HCAP vs Aspiration PNA, - finishing abx  3. Acute blood loss anemia , h/h Stable  4. Gastrointestinal hemorrhage -   5. CVA - Left upper extremity spasm/paresis.   6. Seizures - on valproic acid  7. Chronic Intersitial lung disease -   8. Anxiety -  9. Sinus tachycardia stable    Plan  OOB to chair, PT  monitor CBC daily  restart BB for tachycardia.    suspect from deconditioning   cardio f/u    GI f/u  PT  d/w Dr. Light, D/w Dr. Sandra Today    SCD for DVT prophylaxis.   continue xanax. Monitor for signs of benzo withdrawal.  S&S Eval for PNA       Sedation & Analgesia:	n/a  Diet/Nutrition:		oral diet  GI PPx:			PPI    DVT Ppx:		Venodynes	due to active bleeding       Activity:		              OOB chair  Head of Bed:               35-45 deg  Glycemic Control:        n/a    Lines: RIJ Removed 5/1 - site intact today    Disposition:Continue care on tele, Will need dispo planning ? Rehab, need eventual neuro f/u at [facility].     Goals of Care: Full

## 2019-05-02 NOTE — PROGRESS NOTE ADULT - SUBJECTIVE AND OBJECTIVE BOX
ROMIE VIRAMONTES  59y  Female    Patient is a 59y old  Female who presents with a chief complaint of Rectal bleed (02 May 2019 10:08)    pt seen and examined   no events overnight     PAST MEDICAL & SURGICAL HISTORY:  Interstitial lung disease: on home o2 prn  NHL (non-Hodgkin's lymphoma): Agem 43 sp chemo/rt/stem cell  Transient cerebral ischemia, unspecified type  Mitral prolapse  History of tonsillectomy  History of appendectomy        MedsMEDICATIONS  (STANDING):  ALBUTerol/ipratropium for Nebulization 3 milliLiter(s) Nebulizer every 6 hours  buDESOnide   0.5 milliGRAM(s) Respule 0.5 milliGRAM(s) Inhalation two times a day  chlorhexidine 4% Liquid 1 Application(s) Topical every 12 hours  lidocaine   Patch 1 Patch Transdermal daily  metoprolol tartrate 25 milliGRAM(s) Oral two times a day  multivitamin 1 Tablet(s) Oral daily  pantoprazole  Injectable 40 milliGRAM(s) IV Push every 12 hours  potassium chloride    Tablet ER 40 milliEquivalent(s) Oral once  tiotropium 18 MICROgram(s) Capsule 1 Capsule(s) Inhalation daily  valproic acid 500 milliGRAM(s) Oral every 12 hours  zinc oxide 20% Ointment 1 Application(s) Topical daily    MEDICATIONS  (PRN):  acetaminophen   Tablet .. 650 milliGRAM(s) Oral every 6 hours PRN Temp greater or equal to 38C (100.4F), Mild Pain (1 - 3)  ALBUTerol/ipratropium for Nebulization 3 milliLiter(s) Nebulizer every 6 hours PRN Shortness of Breath and/or Wheezing  guaiFENesin    Syrup 100 milliGRAM(s) Oral every 6 hours PRN Cough  haloperidol    Injectable 2.5 milliGRAM(s) IntraMuscular once PRN agitation  ondansetron Injectable 4 milliGRAM(s) IV Push every 6 hours PRN Nausea and/or Vomiting  sodium chloride 0.9% lock flush 10 milliLiter(s) IV Push every 1 hour PRN Pre/post blood products, medications, blood draw, and to maintain line patency      Vital Signs Last 24 Hrs  T(C): 36.9 (02 May 2019 07:34), Max: 37 (02 May 2019 05:18)  T(F): 98.5 (02 May 2019 07:34), Max: 98.6 (02 May 2019 05:18)  HR: 76 (02 May 2019 09:34) (76 - 106)  BP: 122/67 (02 May 2019 07:34) (100/66 - 122/67)  BP(mean): --  RR: 16 (02 May 2019 07:34) (15 - 16)  SpO2: 98% (02 May 2019 09:34) (93% - 100%)    PHYSICAL EXAM:  GENERAL: NAD  NERVOUS SYSTEM:  Alert & Oriented X3  CHEST/LUNG: Clear lungs b/l  HEART: S1S2, RRR   ABDOMEN: Soft, non-tender, non-distended, + bowel sounds  EXTREMITIES:  2+ Peripheral Pulses, No clubbing, cyanosis, or edema      LABS:                          12.3   11.0  )-----------( 295      ( 01 May 2019 06:50 )             38.4       05-01    137  |  100  |  7   ----------------------------<  105<H>  3.0<L>   |  29  |  0.72    Ca    9.0      01 May 2019 06:50  Phos  3.1     05-01  Mg     2.0     05-01                                      RADIOLOGY & ADDITIONAL TESTS:    Imaging Personally Reviewed:  [x ] YES  [ ] NO      HEALTH ISSUES - PROBLEM Dx:  Transient cerebral ischemia, unspecified type: Transient cerebral ischemia, unspecified type  Anemia due to blood loss: Anemia due to blood loss  GI bleed: GI bleed  Hypovolemic shock: Hypovolemic shock  Rectal bleed: Rectal bleed          Care Discussed with Consultants/Other Providers [ x] YES  [ ] NO

## 2019-05-03 LAB
ANION GAP SERPL CALC-SCNC: 9 MMOL/L — SIGNIFICANT CHANGE UP (ref 5–17)
BUN SERPL-MCNC: 22 MG/DL — SIGNIFICANT CHANGE UP (ref 7–23)
CALCIUM SERPL-MCNC: 9.7 MG/DL — SIGNIFICANT CHANGE UP (ref 8.4–10.5)
CHLORIDE SERPL-SCNC: 102 MMOL/L — SIGNIFICANT CHANGE UP (ref 96–108)
CO2 SERPL-SCNC: 31 MMOL/L — SIGNIFICANT CHANGE UP (ref 22–31)
CREAT SERPL-MCNC: 1 MG/DL — SIGNIFICANT CHANGE UP (ref 0.5–1.3)
GLUCOSE SERPL-MCNC: 76 MG/DL — SIGNIFICANT CHANGE UP (ref 70–99)
POTASSIUM SERPL-MCNC: 3.5 MMOL/L — SIGNIFICANT CHANGE UP (ref 3.5–5.3)
POTASSIUM SERPL-SCNC: 3.5 MMOL/L — SIGNIFICANT CHANGE UP (ref 3.5–5.3)
SODIUM SERPL-SCNC: 142 MMOL/L — SIGNIFICANT CHANGE UP (ref 135–145)

## 2019-05-03 PROCEDURE — 99233 SBSQ HOSP IP/OBS HIGH 50: CPT

## 2019-05-03 RX ORDER — ASPIRIN/CALCIUM CARB/MAGNESIUM 324 MG
81 TABLET ORAL DAILY
Qty: 0 | Refills: 0 | Status: DISCONTINUED | OUTPATIENT
Start: 2019-05-03 | End: 2019-05-09

## 2019-05-03 RX ADMIN — Medication 1 TABLET(S): at 12:00

## 2019-05-03 RX ADMIN — Medication 100 MILLIGRAM(S): at 14:31

## 2019-05-03 RX ADMIN — Medication 500 MILLIGRAM(S): at 17:01

## 2019-05-03 RX ADMIN — Medication 0.5 MILLIGRAM(S): at 08:58

## 2019-05-03 RX ADMIN — Medication 650 MILLIGRAM(S): at 19:27

## 2019-05-03 RX ADMIN — PANTOPRAZOLE SODIUM 40 MILLIGRAM(S): 20 TABLET, DELAYED RELEASE ORAL at 16:59

## 2019-05-03 RX ADMIN — TIOTROPIUM BROMIDE 1 CAPSULE(S): 18 CAPSULE ORAL; RESPIRATORY (INHALATION) at 09:01

## 2019-05-03 RX ADMIN — Medication 3 MILLILITER(S): at 15:38

## 2019-05-03 RX ADMIN — Medication 650 MILLIGRAM(S): at 07:44

## 2019-05-03 RX ADMIN — Medication 25 MILLIGRAM(S): at 17:01

## 2019-05-03 RX ADMIN — Medication 650 MILLIGRAM(S): at 08:44

## 2019-05-03 RX ADMIN — Medication 500 MILLIGRAM(S): at 05:13

## 2019-05-03 RX ADMIN — ONDANSETRON 4 MILLIGRAM(S): 8 TABLET, FILM COATED ORAL at 16:56

## 2019-05-03 RX ADMIN — PANTOPRAZOLE SODIUM 40 MILLIGRAM(S): 20 TABLET, DELAYED RELEASE ORAL at 05:13

## 2019-05-03 RX ADMIN — Medication 3 MILLILITER(S): at 09:00

## 2019-05-03 RX ADMIN — ZINC OXIDE 1 APPLICATION(S): 200 OINTMENT TOPICAL at 12:00

## 2019-05-03 RX ADMIN — Medication 0.5 MILLIGRAM(S): at 22:12

## 2019-05-03 RX ADMIN — Medication 3 MILLILITER(S): at 08:58

## 2019-05-03 RX ADMIN — Medication 650 MILLIGRAM(S): at 20:47

## 2019-05-03 RX ADMIN — Medication 3 MILLILITER(S): at 22:12

## 2019-05-03 NOTE — PROGRESS NOTE ADULT - SUBJECTIVE AND OBJECTIVE BOX
Patient is a 59y old  Female who presents with a chief complaint of Rectal bleed    Patient seen and examined at bedside.    ALLERGIES:  IV Contrast (Anaphylaxis)  shellfish. (Anaphylaxis)    MEDICATIONS  (STANDING):  ALBUTerol/ipratropium for Nebulization 3 milliLiter(s) Nebulizer every 6 hours  buDESOnide   0.5 milliGRAM(s) Respule 0.5 milliGRAM(s) Inhalation two times a day  chlorhexidine 4% Liquid 1 Application(s) Topical every 12 hours  lidocaine   Patch 1 Patch Transdermal daily  metoprolol tartrate 25 milliGRAM(s) Oral two times a day  multivitamin 1 Tablet(s) Oral daily  pantoprazole  Injectable 40 milliGRAM(s) IV Push every 12 hours  tiotropium 18 MICROgram(s) Capsule 1 Capsule(s) Inhalation daily  valproic acid 500 milliGRAM(s) Oral every 12 hours  zinc oxide 40% Ointment 1 Application(s) Topical daily    MEDICATIONS  (PRN):  acetaminophen   Tablet .. 650 milliGRAM(s) Oral every 6 hours PRN Temp greater or equal to 38C (100.4F), Mild Pain (1 - 3)  ALBUTerol/ipratropium for Nebulization 3 milliLiter(s) Nebulizer every 6 hours PRN Shortness of Breath and/or Wheezing  guaiFENesin    Syrup 100 milliGRAM(s) Oral every 6 hours PRN Cough  haloperidol    Injectable 2.5 milliGRAM(s) IntraMuscular once PRN agitation  ondansetron Injectable 4 milliGRAM(s) IV Push every 6 hours PRN Nausea and/or Vomiting  sodium chloride 0.9% lock flush 10 milliLiter(s) IV Push every 1 hour PRN Pre/post blood products, medications, blood draw, and to maintain line patency    Vital Signs Last 24 Hrs  T(F): 97.9 (03 May 2019 05:08), Max: 98.5 (02 May 2019 16:23)  HR: 80 (03 May 2019 05:08) (76 - 89)  BP: 109/68 (03 May 2019 05:08) (109/68 - 121/66)  RR: 16 (03 May 2019 05:08) (16 - 16)  SpO2: 96% (03 May 2019 05:08) (95% - 98%)  I&O's Summary    02 May 2019 07:01  -  03 May 2019 07:00  --------------------------------------------------------  IN: 848 mL / OUT: 0 mL / NET: 848 mL      PHYSICAL EXAM:  General: NAD, A/O x 3  ENT: MMM  Neck: Supple, No JVD  Lungs: Clear to auscultation bilaterally  Cardio: RRR, S1/S2, No murmurs  Abdomen: Soft, Nontender, Nondistended; Bowel sounds present  Extremities: No calf tenderness, No pitting edema    LABS:                        12.3   11.0  )-----------( 295      ( 01 May 2019 06:50 )             38.4     05-01    137  |  100  |  7   ----------------------------<  105  3.0   |  29  |  0.72    Ca    9.0      01 May 2019 06:50  Phos  3.1     05-01  Mg     2.0     05-01      eGFR if Non African American: 90 mL/min/1.73M2 (05-01-19 @ 06:50)  eGFR if African American: 104 mL/min/1.73M2 (05-01-19 @ 06:50)            03-13 Chol 193 mg/dL  mg/dL HDL 62 mg/dL Trig 112 mg/dL        CAPILLARY BLOOD GLUCOSE              RADIOLOGY & ADDITIONAL TESTS:    Care Discussed with Consultants/Other Providers: Patient is a 59y old  Female who presents with a chief complaint of Rectal bleed    Patient seen and examined at bedside.    ALLERGIES:  IV Contrast (Anaphylaxis)  shellfish. (Anaphylaxis)    MEDICATIONS  (STANDING):  ALBUTerol/ipratropium for Nebulization 3 milliLiter(s) Nebulizer every 6 hours  buDESOnide   0.5 milliGRAM(s) Respule 0.5 milliGRAM(s) Inhalation two times a day  chlorhexidine 4% Liquid 1 Application(s) Topical every 12 hours  lidocaine   Patch 1 Patch Transdermal daily  metoprolol tartrate 25 milliGRAM(s) Oral two times a day  multivitamin 1 Tablet(s) Oral daily  pantoprazole  Injectable 40 milliGRAM(s) IV Push every 12 hours  tiotropium 18 MICROgram(s) Capsule 1 Capsule(s) Inhalation daily  valproic acid 500 milliGRAM(s) Oral every 12 hours  zinc oxide 40% Ointment 1 Application(s) Topical daily    MEDICATIONS  (PRN):  acetaminophen   Tablet .. 650 milliGRAM(s) Oral every 6 hours PRN Temp greater or equal to 38C (100.4F), Mild Pain (1 - 3)  ALBUTerol/ipratropium for Nebulization 3 milliLiter(s) Nebulizer every 6 hours PRN Shortness of Breath and/or Wheezing  guaiFENesin    Syrup 100 milliGRAM(s) Oral every 6 hours PRN Cough  haloperidol    Injectable 2.5 milliGRAM(s) IntraMuscular once PRN agitation  ondansetron Injectable 4 milliGRAM(s) IV Push every 6 hours PRN Nausea and/or Vomiting  sodium chloride 0.9% lock flush 10 milliLiter(s) IV Push every 1 hour PRN Pre/post blood products, medications, blood draw, and to maintain line patency    Vital Signs Last 24 Hrs  T(F): 97.9 (03 May 2019 05:08), Max: 98.5 (02 May 2019 16:23)  HR: 80 (03 May 2019 05:08) (76 - 89)  BP: 109/68 (03 May 2019 05:08) (109/68 - 121/66)  RR: 16 (03 May 2019 05:08) (16 - 16)  SpO2: 96% (03 May 2019 05:08) (95% - 98%)  I&O's Summary    02 May 2019 07:01  -  03 May 2019 07:00  --------------------------------------------------------  IN: 848 mL / OUT: 0 mL / NET: 848 mL      PHYSICAL EXAM:  General: NAD, A/O x 3  ENT: MMM  Neck: Supple, No JVD  Lungs: Clear to auscultation bilaterally  Cardio: RRR, S1/S2, No murmurs  Abdomen: Soft, Nontender, Nondistended; Bowel sounds present  Extremities: No calf tenderness, No pitting edema    LABS:                        12.3   11.0  )-----------( 295      ( 01 May 2019 06:50 )             38.4     05-01    137  |  100  |  7   ----------------------------<  105  3.0   |  29  |  0.72    Ca    9.0      01 May 2019 06:50  Phos  3.1     05-01  Mg     2.0     05-01    eGFR if Non African American: 90 mL/min/1.73M2 (05-01-19 @ 06:50)  eGFR if African American: 104 mL/min/1.73M2 (05-01-19 @ 06:50)    03-13 Chol 193 mg/dL  mg/dL HDL 62 mg/dL Trig 112 mg/dL    CAPILLARY BLOOD GLUCOSE    RADIOLOGY & ADDITIONAL TESTS:    Care Discussed with Consultants/Other Providers:

## 2019-05-03 NOTE — PROGRESS NOTE ADULT - ASSESSMENT
Physical Examination:  GENERAL:               Alert, Oriented, No acute distress.    HEENT:                    No JVD, dry MM No stridor  PULM:                     Bilateral air entry, Clear to auscultation bilaterally, no significant sputum production, No Rales, trace Rhonchi, No Wheezing  CVS:                         S1, S2,  +Murmur  ABD:                        Soft, nondistended, nontender, normoactive bowel sounds,   EXT:                         No edema, nontender, No Cyanosis or Clubbing   Vascular:                Warm Extremities, Normal Capillary refill,   NEURO:                  Alert , oriented, interactive,  follows commands  PSYC:                      Calm, + Insight.    Assessment:  59 yr old female PMHx of MVP, chronic SDH, interstitial lung disease/pneumothorax, pulmonary nodules, meniere's, non hodgkins lymphoma [place] [facility] [place] TIA, tachycardia, occipital neuralgia, and neuropathy being admitted to the ICU for acute GI bleeding with hypotension.       1. Hypovolemic shock  with possible Septic component , Resolved   2. Left HCAP vs Aspiration PNA, - Resolved  3. Acute blood loss anemia , h/h Stable  4. Gastrointestinal hemorrhage - resolved  5. CVA - Left upper extremity spasm/paresis. need further workup at [facility] as out patient  6. Seizures - on valproic acid  7. Chronic Intersitial lung disease -   8. Anxiety -  9. Sinus tachycardia stable    Plan  OOB to chair, PT  monitor CBC daily  neuro f.u      GI f/u  PT  d/w Dr. Light, D/w Dr. Sandra Today    SCD for DVT prophylaxis.   continue xanax. Monitor for signs of benzo withdrawal.  S&S Eval for PNA       Sedation & Analgesia:	n/a  Diet/Nutrition:		oral diet  GI PPx:			PPI    DVT Ppx:		Venodynes	due to active bleeding       Disposition:Continue care on tele, Will need dispo planning ? Rehab, need eventual neuro f/u at [facility]. No active ccm issues, reconsult as needed today d/w Dr. Rojas    Goals of Care: Full

## 2019-05-03 NOTE — PROGRESS NOTE ADULT - ASSESSMENT
60 yo F, remote lymphoma, pulm nodules, ILD, chronic b/l SDH, prolonged hosp since March for neuro decompensation- new onset Sz, cerebral ischemia, as well tachycardia, underlying chronic lung dz.    She was briefly at rehab, only to require admission for rectal bleed- mult Units PRBCs, ICU monitoring, pressors- covered for sepsis    Afebrile, but leukocytosis and elevated PCT.    CT with basilar infiltrates, changes on CXR, might question pneumonia, but difficult to distinguish from chronic, underlying lung dz  Bld Cxs negative   Afebrile, alert, WBC improved, PCT lower  Hemodynamically stable  H/H stable   No sign of uncontrolled infection- completed course Vanco and Zosyn  Stable off antibiotics  Plan:  Observe off antibiotics.   Follow temps and CBC/diff   Lower GIB as per GI  No additional ID w/u is planned, we will stop actively following, please call if ID issues arise.

## 2019-05-03 NOTE — PROGRESS NOTE ADULT - SUBJECTIVE AND OBJECTIVE BOX
CC: f/u for possible sepsis and pneumonia    Patient reports: she is comfortable in chair, s/p colonoscopy.She appears comfortable on RA    REVIEW OF SYSTEMS:  All other review of systems negative (Comprehensive ROS)    Antimicrobials Day #  :off    Other Medications Reviewed    T(F): 97.3 (05-03-19 @ 15:52), Max: 98.5 (05-03-19 @ 09:30)  HR: 99 (05-03-19 @ 15:52)  BP: 119/66 (05-03-19 @ 15:52)  RR: 16 (05-03-19 @ 15:52)  SpO2: 97% (05-03-19 @ 15:52)  Wt(kg): --    PHYSICAL EXAM:  General: alert, no acute distress, frail   Eyes:  anicteric, no conjunctival injection, no discharge  Oropharynx: no lesions or injection 	  Neck: supple, without adenopathy  Lungs: coarse BS  Heart: regular rate and rhythm; no murmur, rubs or gallops  Abdomen: soft, nondistended, nontender, without mass or organomegaly  Skin: no lesions  Extremities: no clubbing, cyanosis, or edema  Neurologic: alert, oriented, moves all extremities    LAB RESULTS:    05-03    142  |  102  |  22  ----------------------------<  76  3.5   |  31  |  1.00    Ca    9.7      03 May 2019 05:59          MICROBIOLOGY:  RECENT CULTURES:      RADIOLOGY REVIEWED:  < from: Xray Chest 1 View- PORTABLE-Routine (04.29.19 @ 06:52) >  EXAM:  XR CHEST PORTABLE ROUTINE 1V      PROCEDURE DATE:  04/29/2019        INTERPRETATION:  Single view chest    History shortness of breath, interstitial lung disease    Comparison yesterday    The central line remains in place tip centered over the upper right   atrium. There is moderate patchy interstitial opacity, unchanged without   lobar consolidation or layering effusion. There is mild cardiomegaly.   There is no pneumothorax.    < end of copied text >

## 2019-05-03 NOTE — PROGRESS NOTE ADULT - ASSESSMENT
59 yr old female PMHx of MVP, chronic SDH, interstitial lung disease/pneumothorax, pulmonary nodules, meniere's, non hodgkins lymphoma (SCD/XRT/chemo at [facility] 2003), TIA, tachycardia, occipital neuralgia, and neuropathy being admitted to the ICU for acute GI bleeding with hypotension    #Hypovolemic shock 2/2 GIB  resolved   #Left HCAP vs Aspiration PNA  resolved , off abx   #Acute blood loss anemia/GIB  h/h stable  GI following   #CVA   #Seizures - on valproic acid  #Chronic Intersitial lung disease - stable   #Sinus tachycardia- stable 59 yr old female PMHx of MVP, chronic SDH, interstitial lung disease/pneumothorax, pulmonary nodules, meniere's, non hodgkins lymphoma (SCD/XRT/chemo at [facility] 2003), TIA, tachycardia, occipital neuralgia, and neuropathy being admitted to the ICU for acute GI bleeding with hypotension    #Hypovolemic shock 2/2 GIB  resolved     Left HCAP vs Aspiration PNA  resolved , off abx   mild cough so will add cough syrup    Acute blood loss anemia/GIB  h/h stable  GI following     CVA - start asa 81 mg     Seizures - on valproic acid    Chronic Intersitial lung disease - stable     Sinus tachycardia- stable

## 2019-05-03 NOTE — PROGRESS NOTE ADULT - SUBJECTIVE AND OBJECTIVE BOX
Follow-up Critical Care Progress Note  Chief Complaint : Other ulcerative colitis with rectal bleeding      patient feels better, no cp, sob, palp, n/v  comfortable, feels stronger  no complaitns  cough resolving    Allergies :IV Contrast (Anaphylaxis)  shellfish. (Anaphylaxis)      PAST MEDICAL & SURGICAL HISTORY:  Interstitial lung disease: on home o2 prn  NHL (non-Hodgkin's lymphoma): Agem 43 sp chemo/rt/stem cell  Transient cerebral ischemia, unspecified type  Mitral prolapse  History of tonsillectomy  History of appendectomy      Medications:  MEDICATIONS  (STANDING):  ALBUTerol/ipratropium for Nebulization 3 milliLiter(s) Nebulizer every 6 hours  buDESOnide   0.5 milliGRAM(s) Respule 0.5 milliGRAM(s) Inhalation two times a day  chlorhexidine 4% Liquid 1 Application(s) Topical every 12 hours  lidocaine   Patch 1 Patch Transdermal daily  metoprolol tartrate 25 milliGRAM(s) Oral two times a day  multivitamin 1 Tablet(s) Oral daily  pantoprazole  Injectable 40 milliGRAM(s) IV Push every 12 hours  tiotropium 18 MICROgram(s) Capsule 1 Capsule(s) Inhalation daily  valproic acid 500 milliGRAM(s) Oral every 12 hours  zinc oxide 40% Ointment 1 Application(s) Topical daily    MEDICATIONS  (PRN):  acetaminophen   Tablet .. 650 milliGRAM(s) Oral every 6 hours PRN Temp greater or equal to 38C (100.4F), Mild Pain (1 - 3)  ALBUTerol/ipratropium for Nebulization 3 milliLiter(s) Nebulizer every 6 hours PRN Shortness of Breath and/or Wheezing  guaiFENesin    Syrup 100 milliGRAM(s) Oral every 6 hours PRN Cough  haloperidol    Injectable 2.5 milliGRAM(s) IntraMuscular once PRN agitation  ondansetron Injectable 4 milliGRAM(s) IV Push every 6 hours PRN Nausea and/or Vomiting  sodium chloride 0.9% lock flush 10 milliLiter(s) IV Push every 1 hour PRN Pre/post blood products, medications, blood draw, and to maintain line patency      LABS:    05-03    142  |  102  |  22  ----------------------------<  76  3.5   |  31  |  1.00    Ca    9.7      03 May 2019 05:59          VITALS:  T(C): 36.9 (05-03-19 @ 09:30), Max: 36.9 (05-02-19 @ 16:23)  T(F): 98.5 (05-03-19 @ 09:30), Max: 98.5 (05-02-19 @ 16:23)  HR: 85 (05-03-19 @ 09:30) (78 - 89)  BP: 123/72 (05-03-19 @ 09:30) (109/68 - 123/72)  BP(mean): --  ABP: --  ABP(mean): --  RR: 15 (05-03-19 @ 09:30) (15 - 16)  SpO2: 96% (05-03-19 @ 09:30) (95% - 97%)  CVP(mm Hg): --  CVP(cm H2O): --    Ins and Outs     05-02-19 @ 07:01  -  05-03-19 @ 07:00  --------------------------------------------------------  IN: 848 mL / OUT: 0 mL / NET: 848 mL

## 2019-05-04 PROCEDURE — 99233 SBSQ HOSP IP/OBS HIGH 50: CPT

## 2019-05-04 RX ADMIN — Medication 650 MILLIGRAM(S): at 23:03

## 2019-05-04 RX ADMIN — Medication 650 MILLIGRAM(S): at 07:41

## 2019-05-04 RX ADMIN — Medication 650 MILLIGRAM(S): at 09:00

## 2019-05-04 RX ADMIN — Medication 0.5 MILLIGRAM(S): at 09:18

## 2019-05-04 RX ADMIN — Medication 500 MILLIGRAM(S): at 17:47

## 2019-05-04 RX ADMIN — TIOTROPIUM BROMIDE 1 CAPSULE(S): 18 CAPSULE ORAL; RESPIRATORY (INHALATION) at 09:19

## 2019-05-04 RX ADMIN — PANTOPRAZOLE SODIUM 40 MILLIGRAM(S): 20 TABLET, DELAYED RELEASE ORAL at 17:47

## 2019-05-04 RX ADMIN — Medication 3 MILLILITER(S): at 09:18

## 2019-05-04 RX ADMIN — ONDANSETRON 4 MILLIGRAM(S): 8 TABLET, FILM COATED ORAL at 12:49

## 2019-05-04 RX ADMIN — Medication 25 MILLIGRAM(S): at 06:19

## 2019-05-04 RX ADMIN — PANTOPRAZOLE SODIUM 40 MILLIGRAM(S): 20 TABLET, DELAYED RELEASE ORAL at 06:19

## 2019-05-04 RX ADMIN — ZINC OXIDE 1 APPLICATION(S): 200 OINTMENT TOPICAL at 13:20

## 2019-05-04 RX ADMIN — Medication 25 MILLIGRAM(S): at 17:47

## 2019-05-04 RX ADMIN — Medication 1 TABLET(S): at 12:49

## 2019-05-04 RX ADMIN — Medication 650 MILLIGRAM(S): at 22:03

## 2019-05-04 RX ADMIN — Medication 500 MILLIGRAM(S): at 06:19

## 2019-05-04 RX ADMIN — Medication 81 MILLIGRAM(S): at 17:48

## 2019-05-04 NOTE — PROGRESS NOTE ADULT - ASSESSMENT
59 yr old female PMHx of MVP, chronic SDH, interstitial lung disease/pneumothorax, pulmonary nodules, meniere's, non hodgkins lymphoma (SCD/XRT/chemo at [facility] 2003), TIA, tachycardia, occipital neuralgia, and neuropathy being admitted to the ICU for acute GI bleeding with hypotension    #Hypovolemic shock 2/2 GIB  resolved     Left HCAP vs Aspiration PNA  resolved , off abx   mild cough so will add cough syrup    Acute blood loss anemia/GIB  h/h stable  GI following     CVA - cont. asa     Seizures - on valproic acid    Chronic Intersitial lung disease - stable     Sinus tachycardia- stable    Plan: discharge planning for acute rehab, OT consult pending 59 yr old female PMHx of MVP, chronic SDH, interstitial lung disease/pneumothorax, pulmonary nodules, meniere's, non hodgkins lymphoma (SCD/XRT/chemo at [facility] 2003), TIA, tachycardia, occipital neuralgia, and neuropathy being admitted to the ICU for acute GI bleeding with hypotension    #Hypovolemic shock 2/2 GIB  resolved     Gram negative pneumonia vs Aspiration PNA  resolved , off abx   mild cough so will add cough syrup    Acute blood loss anemia/GIB  h/h stable  GI following     CVA - cont. asa     Seizures - on valproic acid    Chronic Intersitial lung disease - stable     Sinus tachycardia- stable    Plan: discharge planning for acute rehab, OT consult pending

## 2019-05-04 NOTE — PROGRESS NOTE ADULT - SUBJECTIVE AND OBJECTIVE BOX
Patient is a 59y old  Female who presents with a chief complaint of Rectal bleed (03 May 2019 17:20)      Patient seen and examined at bedside, no events overnight, in no acute distress.     ALLERGIES:  IV Contrast (Anaphylaxis)  shellfish. (Anaphylaxis)    MEDICATIONS:  acetaminophen   Tablet .. 650 milliGRAM(s) Oral every 6 hours PRN  ALBUTerol/ipratropium for Nebulization 3 milliLiter(s) Nebulizer every 6 hours  ALBUTerol/ipratropium for Nebulization 3 milliLiter(s) Nebulizer every 6 hours PRN  aspirin enteric coated 81 milliGRAM(s) Oral daily  buDESOnide   0.5 milliGRAM(s) Respule 0.5 milliGRAM(s) Inhalation two times a day  chlorhexidine 4% Liquid 1 Application(s) Topical every 12 hours  guaiFENesin    Syrup 100 milliGRAM(s) Oral every 6 hours PRN  guaiFENesin    Syrup 200 milliGRAM(s) Oral every 6 hours PRN  haloperidol    Injectable 2.5 milliGRAM(s) IntraMuscular once PRN  lidocaine   Patch 1 Patch Transdermal daily  metoprolol tartrate 25 milliGRAM(s) Oral two times a day  multivitamin 1 Tablet(s) Oral daily  ondansetron Injectable 4 milliGRAM(s) IV Push every 6 hours PRN  pantoprazole  Injectable 40 milliGRAM(s) IV Push every 12 hours  sodium chloride 0.9% lock flush 10 milliLiter(s) IV Push every 1 hour PRN  tiotropium 18 MICROgram(s) Capsule 1 Capsule(s) Inhalation daily  valproic acid 500 milliGRAM(s) Oral every 12 hours  zinc oxide 40% Ointment 1 Application(s) Topical daily    Vital Signs Last 24 Hrs  T(C): 36.5 (04 May 2019 07:42), Max: 36.9 (03 May 2019 22:34)  T(F): 97.7 (04 May 2019 07:42), Max: 98.4 (03 May 2019 22:34)  HR: 83 (04 May 2019 09:25) (79 - 99)  BP: 131/72 (04 May 2019 07:42) (110/66 - 131/72)  BP(mean): --  RR: 16 (04 May 2019 07:42) (15 - 16)  SpO2: 98% (04 May 2019 09:25) (95% - 98%)  I&O's Summary    03 May 2019 07:  -  04 May 2019 07:00  --------------------------------------------------------  IN: 600 mL / OUT: 0 mL / NET: 600 mL    04 May 2019 07:  -  04 May 2019 11:59  --------------------------------------------------------  IN: 360 mL / OUT: 0 mL / NET: 360 mL          PAST MEDICAL & SURGICAL HISTORY:  Interstitial lung disease: on home o2 prn  NHL (non-Hodgkin's lymphoma): Agem 43 sp chemo/rt/stem cell  Transient cerebral ischemia, unspecified type  Mitral prolapse  History of tonsillectomy  History of appendectomy      Home Medications:  acetaminophen 325 mg oral tablet: 2 tab(s) orally every 6 hours, As needed, Mild Pain (1 - 3) (2019 05:07)  ALPRAZolam 0.5 mg oral tablet: 0.5 tab(s) orally 3 times a day (2019 05:07)  budesonide 0.25 mg/2 mL inhalation suspension:  inhaled  (2019 05:07)  cholecalciferol oral tablet: 2000 unit(s) orally once a day (2019 05:07)  cyanocobalamin 1000 mcg oral tablet: 1 tab(s) orally once a day (2019 05:07)  divalproex sodium 500 mg oral delayed release tablet: 1 tab(s) orally 2 times a day (2019 05:07)  Lactated Ringers Injection intravenous solution:  intravenous  (2019 05:07)  levalbuterol 0.63 mg/3 mL inhalation solution: 3 milliliter(s) inhaled every 8 hours, As needed, bronchospasm (2019 05:07)  lidocaine 5% topical film:  topically  (2019 05:07)  melatonin 3 mg oral tablet: 1 tab(s) orally once a day (at bedtime), As needed, Sleep (2019 05:07)  Metoprolol Tartrate 25 mg oral tablet: 0.5 tab(s) orally 2 times a day (2019 05:07)  ondansetron 4 mg oral tablet, disintegratin tab(s) orally every 6 hours, As needed, Nausea and/or Vomiting (2019 05:07)  pantoprazole 40 mg intravenous injection: 40 milligram(s) intravenous every 12 hours (2019 05:07)  tiotropium 18 mcg inhalation capsule: 1 cap(s) inhaled once a day (2019 05:07)  traMADol 50 mg oral tablet: 1 tab(s) orally 3 times a day, As needed, Severe Pain (7 - 10) (2019 05:07)  zinc oxide 20% topical ointment: 1 application topically once a day (2019 05:07)      PHYSICAL EXAM:  General: NAD, A/O x3  ENT: MMM  Neck: Supple, No JVD  Lungs: Clear to percussion bilaterally  Cardio: RRR, S1/S2, No murmurs  Abdomen: Soft, Nontender, Nondistended; Bowel sounds present  Extremities: No clubbing, cyanosis, or edema    LABS:        142  |  102  |  22  ----------------------------<  76  3.5   |  31  |  1.00    Ca    9.7      03 May 2019 05:59      03-13 Chol 193 mg/dL  mg/dL HDL 62 mg/dL Trig 112 mg/dL    RADIOLOGY & ADDITIONAL TESTS:    Care Discussed with Consultants/Other Providers: Hospitalist

## 2019-05-05 LAB
ANION GAP SERPL CALC-SCNC: 8 MMOL/L — SIGNIFICANT CHANGE UP (ref 5–17)
BUN SERPL-MCNC: 29 MG/DL — HIGH (ref 7–23)
CALCIUM SERPL-MCNC: 9.3 MG/DL — SIGNIFICANT CHANGE UP (ref 8.4–10.5)
CHLORIDE SERPL-SCNC: 100 MMOL/L — SIGNIFICANT CHANGE UP (ref 96–108)
CO2 SERPL-SCNC: 32 MMOL/L — HIGH (ref 22–31)
CREAT SERPL-MCNC: 0.92 MG/DL — SIGNIFICANT CHANGE UP (ref 0.5–1.3)
GLUCOSE SERPL-MCNC: 80 MG/DL — SIGNIFICANT CHANGE UP (ref 70–99)
HCT VFR BLD CALC: 34.3 % — LOW (ref 34.5–45)
HGB BLD-MCNC: 10.8 G/DL — LOW (ref 11.5–15.5)
MCHC RBC-ENTMCNC: 29.7 PG — SIGNIFICANT CHANGE UP (ref 27–34)
MCHC RBC-ENTMCNC: 31.5 GM/DL — LOW (ref 32–36)
MCV RBC AUTO: 94.2 FL — SIGNIFICANT CHANGE UP (ref 80–100)
PLATELET # BLD AUTO: 342 K/UL — SIGNIFICANT CHANGE UP (ref 150–400)
POTASSIUM SERPL-MCNC: 3.7 MMOL/L — SIGNIFICANT CHANGE UP (ref 3.5–5.3)
POTASSIUM SERPL-SCNC: 3.7 MMOL/L — SIGNIFICANT CHANGE UP (ref 3.5–5.3)
RBC # BLD: 3.64 M/UL — LOW (ref 3.8–5.2)
RBC # FLD: 14.4 % — SIGNIFICANT CHANGE UP (ref 10.3–14.5)
SODIUM SERPL-SCNC: 140 MMOL/L — SIGNIFICANT CHANGE UP (ref 135–145)
WBC # BLD: 8.4 K/UL — SIGNIFICANT CHANGE UP (ref 3.8–10.5)
WBC # FLD AUTO: 8.4 K/UL — SIGNIFICANT CHANGE UP (ref 3.8–10.5)

## 2019-05-05 PROCEDURE — 99233 SBSQ HOSP IP/OBS HIGH 50: CPT

## 2019-05-05 RX ORDER — DOCUSATE SODIUM 100 MG
100 CAPSULE ORAL DAILY
Qty: 0 | Refills: 0 | Status: DISCONTINUED | OUTPATIENT
Start: 2019-05-05 | End: 2019-05-09

## 2019-05-05 RX ORDER — ACETAMINOPHEN 500 MG
650 TABLET ORAL ONCE
Qty: 0 | Refills: 0 | Status: COMPLETED | OUTPATIENT
Start: 2019-05-05 | End: 2019-05-05

## 2019-05-05 RX ORDER — BUDESONIDE AND FORMOTEROL FUMARATE DIHYDRATE 160; 4.5 UG/1; UG/1
2 AEROSOL RESPIRATORY (INHALATION) EVERY 12 HOURS
Qty: 0 | Refills: 0 | Status: DISCONTINUED | OUTPATIENT
Start: 2019-05-05 | End: 2019-05-09

## 2019-05-05 RX ORDER — SODIUM CHLORIDE 9 MG/ML
500 INJECTION INTRAMUSCULAR; INTRAVENOUS; SUBCUTANEOUS ONCE
Qty: 0 | Refills: 0 | Status: COMPLETED | OUTPATIENT
Start: 2019-05-05 | End: 2019-05-05

## 2019-05-05 RX ADMIN — ONDANSETRON 4 MILLIGRAM(S): 8 TABLET, FILM COATED ORAL at 12:07

## 2019-05-05 RX ADMIN — Medication 500 MILLIGRAM(S): at 17:42

## 2019-05-05 RX ADMIN — Medication 650 MILLIGRAM(S): at 02:21

## 2019-05-05 RX ADMIN — Medication 650 MILLIGRAM(S): at 12:00

## 2019-05-05 RX ADMIN — Medication 1 TABLET(S): at 13:03

## 2019-05-05 RX ADMIN — TIOTROPIUM BROMIDE 1 CAPSULE(S): 18 CAPSULE ORAL; RESPIRATORY (INHALATION) at 10:22

## 2019-05-05 RX ADMIN — Medication 25 MILLIGRAM(S): at 05:08

## 2019-05-05 RX ADMIN — Medication 0.5 MILLIGRAM(S): at 10:23

## 2019-05-05 RX ADMIN — Medication 650 MILLIGRAM(S): at 03:21

## 2019-05-05 RX ADMIN — Medication 650 MILLIGRAM(S): at 11:03

## 2019-05-05 RX ADMIN — Medication 650 MILLIGRAM(S): at 21:29

## 2019-05-05 RX ADMIN — Medication 500 MILLIGRAM(S): at 05:08

## 2019-05-05 RX ADMIN — PANTOPRAZOLE SODIUM 40 MILLIGRAM(S): 20 TABLET, DELAYED RELEASE ORAL at 05:09

## 2019-05-05 RX ADMIN — PANTOPRAZOLE SODIUM 40 MILLIGRAM(S): 20 TABLET, DELAYED RELEASE ORAL at 17:42

## 2019-05-05 RX ADMIN — Medication 650 MILLIGRAM(S): at 22:45

## 2019-05-05 RX ADMIN — BUDESONIDE AND FORMOTEROL FUMARATE DIHYDRATE 2 PUFF(S): 160; 4.5 AEROSOL RESPIRATORY (INHALATION) at 21:17

## 2019-05-05 RX ADMIN — SODIUM CHLORIDE 1000 MILLILITER(S): 9 INJECTION INTRAMUSCULAR; INTRAVENOUS; SUBCUTANEOUS at 12:15

## 2019-05-05 NOTE — CHART NOTE - NSCHARTNOTEFT_GEN_A_CORE
Brief nutrition follow up: Pt c/o that soft diet is too restrictive as she is vegetarian and would like to consume fresh fruit and salads. Noted that speech and swallow previously recommended soft solids with thin liquids, however no longer following pt. Pt denies chewing difficulty- would recommend upgrade to regular solids in order to liberalize pt's menu choices. Pt also receptive to trying Ensure Clear (previously reported dislike of Ensure Enlive). Change diet to Regular, lacto-ovo vegetarian + Ensure Clear 8oz daily (240kcals, 8g protein). Pt noted with severe malnutrition and remains at high nutritional risk. RD to follow clinical course.

## 2019-05-05 NOTE — PROGRESS NOTE ADULT - SUBJECTIVE AND OBJECTIVE BOX
Patient is a 59y old  Female who presents with a chief complaint of Rectal bleed (04 May 2019 11:57)      Patient seen and examined at bedside, no events overnight, in no acute distress.     ALLERGIES:  IV Contrast (Anaphylaxis)  shellfish. (Anaphylaxis)    MEDICATIONS:  acetaminophen   Tablet .. 650 milliGRAM(s) Oral every 6 hours PRN  ALBUTerol/ipratropium for Nebulization 3 milliLiter(s) Nebulizer every 6 hours PRN  aspirin enteric coated 81 milliGRAM(s) Oral daily  buDESOnide 160 MICROgram(s)/formoterol 4.5 MICROgram(s) Inhaler 2 Puff(s) Inhalation every 12 hours  chlorhexidine 4% Liquid 1 Application(s) Topical every 12 hours  guaiFENesin    Syrup 100 milliGRAM(s) Oral every 6 hours PRN  guaiFENesin    Syrup 200 milliGRAM(s) Oral every 6 hours PRN  haloperidol    Injectable 2.5 milliGRAM(s) IntraMuscular once PRN  lidocaine   Patch 1 Patch Transdermal daily  metoprolol tartrate 25 milliGRAM(s) Oral two times a day  multivitamin 1 Tablet(s) Oral daily  ondansetron Injectable 4 milliGRAM(s) IV Push every 6 hours PRN  pantoprazole  Injectable 40 milliGRAM(s) IV Push every 12 hours  sodium chloride 0.9% lock flush 10 milliLiter(s) IV Push every 1 hour PRN  tiotropium 18 MICROgram(s) Capsule 1 Capsule(s) Inhalation daily  valproic acid 500 milliGRAM(s) Oral every 12 hours  zinc oxide 40% Ointment 1 Application(s) Topical daily    Vital Signs Last 24 Hrs  T(C): 36.4 (05 May 2019 09:43), Max: 37 (04 May 2019 21:00)  T(F): 97.6 (05 May 2019 09:43), Max: 98.6 (04 May 2019 21:00)  HR: 78 (05 May 2019 10:24) (77 - 94)  BP: 103/65 (05 May 2019 09:43) (98/61 - 132/70)  BP(mean): 73 (05 May 2019 09:43) (73 - 73)  RR: 16 (05 May 2019 09:43) (16 - 17)  SpO2: 97% (05 May 2019 10:24) (95% - 97%)  I&O's Summary    04 May 2019 07:01  -  05 May 2019 07:00  --------------------------------------------------------  IN: 720 mL / OUT: 0 mL / NET: 720 mL    05 May 2019 07:01  -  05 May 2019 10:51  --------------------------------------------------------  IN: 360 mL / OUT: 0 mL / NET: 360 mL          PAST MEDICAL & SURGICAL HISTORY:  Interstitial lung disease: on home o2 prn  NHL (non-Hodgkin's lymphoma): Agem 43 sp chemo/rt/stem cell  Transient cerebral ischemia, unspecified type  Mitral prolapse  History of tonsillectomy  History of appendectomy      Home Medications:  acetaminophen 325 mg oral tablet: 2 tab(s) orally every 6 hours, As needed, Mild Pain (1 - 3) (2019 05:07)  ALPRAZolam 0.5 mg oral tablet: 0.5 tab(s) orally 3 times a day (2019 05:07)  budesonide 0.25 mg/2 mL inhalation suspension:  inhaled  (2019 05:07)  cholecalciferol oral tablet: 2000 unit(s) orally once a day (2019 05:07)  cyanocobalamin 1000 mcg oral tablet: 1 tab(s) orally once a day (2019 05:07)  divalproex sodium 500 mg oral delayed release tablet: 1 tab(s) orally 2 times a day (2019 05:07)  Lactated Ringers Injection intravenous solution:  intravenous  (2019 05:07)  levalbuterol 0.63 mg/3 mL inhalation solution: 3 milliliter(s) inhaled every 8 hours, As needed, bronchospasm (2019 05:07)  lidocaine 5% topical film:  topically  (2019 05:07)  melatonin 3 mg oral tablet: 1 tab(s) orally once a day (at bedtime), As needed, Sleep (2019 05:07)  Metoprolol Tartrate 25 mg oral tablet: 0.5 tab(s) orally 2 times a day (2019 05:07)  ondansetron 4 mg oral tablet, disintegratin tab(s) orally every 6 hours, As needed, Nausea and/or Vomiting (2019 05:07)  pantoprazole 40 mg intravenous injection: 40 milligram(s) intravenous every 12 hours (2019 05:07)  tiotropium 18 mcg inhalation capsule: 1 cap(s) inhaled once a day (2019 05:07)  traMADol 50 mg oral tablet: 1 tab(s) orally 3 times a day, As needed, Severe Pain (7 - 10) (2019 05:07)  zinc oxide 20% topical ointment: 1 application topically once a day (2019 05:07)      PHYSICAL EXAM:  General: NAD, A/O x3  ENT: MMM  Neck: Supple, No JVD  Lungs: Clear to percussion bilaterally  Cardio: RRR, S1/S2, No murmurs  Abdomen: Soft, Nontender, Nondistended; Bowel sounds present  Extremities: No clubbing, cyanosis, or edema    LABS:                        10.8   8.4   )-----------( 342      ( 05 May 2019 05:45 )             34.3     05-    140  |  100  |  29  ----------------------------<  80  3.7   |  32  |  0.92    Ca    9.3      05 May 2019 05:45      03-13 Chol 193 mg/dL  mg/dL HDL 62 mg/dL Trig 112 mg/dL    RADIOLOGY & ADDITIONAL TESTS: no new tests     Care Discussed with Consultants/Other Providers: Hospitalist

## 2019-05-05 NOTE — PROGRESS NOTE ADULT - ASSESSMENT
59 yr old female PMHx of MVP, chronic SDH, interstitial lung disease/pneumothorax, pulmonary nodules, meniere's, non hodgkins lymphoma (SCD/XRT/chemo at [facility] 2003), TIA, tachycardia, occipital neuralgia, and neuropathy being admitted to the ICU for acute GI bleeding with hypotension    #Hypovolemic shock 2/2 GIB  resolved     Left HCAP vs Aspiration PNA  resolved , off abx   Cont. inhalers    Acute blood loss anemia/GIB  h/h stable  GI following     CVA - cont. asa     Seizures - on valproic acid    Chronic Intersitial lung disease - stable     Sinus tachycardia- stable    Plan: discharge planning for acute rehab, OT consult pending 59 yr old female PMHx of MVP, chronic SDH, interstitial lung disease/pneumothorax, pulmonary nodules, meniere's, non hodgkins lymphoma (SCD/XRT/chemo at [facility] 2003), TIA, tachycardia, occipital neuralgia, and neuropathy being admitted to the ICU for acute GI bleeding with hypotension    #Hypovolemic shock 2/2 GIB and acute blood loss anemia  resolved     Left HCAP vs Aspiration PNA  resolved , off abx   Cont. inhalers    Acute blood loss anemia/GIB  h/h stable  GI following     CVA - cont. asa     Seizures - on valproic acid    Chronic Intersitial lung disease - stable     Sinus tachycardia- stable    Plan: discharge planning for acute rehab, OT consult pending

## 2019-05-06 DIAGNOSIS — K59.00 CONSTIPATION, UNSPECIFIED: ICD-10-CM

## 2019-05-06 LAB
ANION GAP SERPL CALC-SCNC: 7 MMOL/L — SIGNIFICANT CHANGE UP (ref 5–17)
BUN SERPL-MCNC: 25 MG/DL — HIGH (ref 7–23)
CALCIUM SERPL-MCNC: 9.6 MG/DL — SIGNIFICANT CHANGE UP (ref 8.4–10.5)
CHLORIDE SERPL-SCNC: 102 MMOL/L — SIGNIFICANT CHANGE UP (ref 96–108)
CO2 SERPL-SCNC: 31 MMOL/L — SIGNIFICANT CHANGE UP (ref 22–31)
CORTICOSTEROID BINDING GLOBULIN RESULT: 1.6 MG/DL — LOW
CORTIS F/TOTAL MFR SERPL: 42 % — SIGNIFICANT CHANGE UP
CORTIS SERPL-MCNC: 18 UG/DL — SIGNIFICANT CHANGE UP
CORTISOL, FREE RESULT: 7.5 UG/DL — HIGH
CREAT SERPL-MCNC: 0.82 MG/DL — SIGNIFICANT CHANGE UP (ref 0.5–1.3)
DCPS, RESULT: 0.5 NG/ML — SIGNIFICANT CHANGE UP
GLUCOSE SERPL-MCNC: 78 MG/DL — SIGNIFICANT CHANGE UP (ref 70–99)
HCT VFR BLD CALC: 38.1 % — SIGNIFICANT CHANGE UP (ref 34.5–45)
HGB BLD-MCNC: 11.8 G/DL — SIGNIFICANT CHANGE UP (ref 11.5–15.5)
MCHC RBC-ENTMCNC: 29.5 PG — SIGNIFICANT CHANGE UP (ref 27–34)
MCHC RBC-ENTMCNC: 31.1 GM/DL — LOW (ref 32–36)
MCV RBC AUTO: 95 FL — SIGNIFICANT CHANGE UP (ref 80–100)
PLATELET # BLD AUTO: 391 K/UL — SIGNIFICANT CHANGE UP (ref 150–400)
POTASSIUM SERPL-MCNC: 4 MMOL/L — SIGNIFICANT CHANGE UP (ref 3.5–5.3)
POTASSIUM SERPL-SCNC: 4 MMOL/L — SIGNIFICANT CHANGE UP (ref 3.5–5.3)
RBC # BLD: 4.01 M/UL — SIGNIFICANT CHANGE UP (ref 3.8–5.2)
RBC # FLD: 14.4 % — SIGNIFICANT CHANGE UP (ref 10.3–14.5)
SODIUM SERPL-SCNC: 140 MMOL/L — SIGNIFICANT CHANGE UP (ref 135–145)
WBC # BLD: 8.2 K/UL — SIGNIFICANT CHANGE UP (ref 3.8–10.5)
WBC # FLD AUTO: 8.2 K/UL — SIGNIFICANT CHANGE UP (ref 3.8–10.5)

## 2019-05-06 PROCEDURE — 99233 SBSQ HOSP IP/OBS HIGH 50: CPT

## 2019-05-06 RX ORDER — DOCUSATE SODIUM 100 MG
100 CAPSULE ORAL DAILY
Qty: 0 | Refills: 0 | Status: DISCONTINUED | OUTPATIENT
Start: 2019-05-06 | End: 2019-05-09

## 2019-05-06 RX ORDER — POLYETHYLENE GLYCOL 3350 17 G/17G
17 POWDER, FOR SOLUTION ORAL DAILY
Qty: 0 | Refills: 0 | Status: DISCONTINUED | OUTPATIENT
Start: 2019-05-06 | End: 2019-05-09

## 2019-05-06 RX ORDER — SENNA PLUS 8.6 MG/1
2 TABLET ORAL AT BEDTIME
Qty: 0 | Refills: 0 | Status: DISCONTINUED | OUTPATIENT
Start: 2019-05-06 | End: 2019-05-09

## 2019-05-06 RX ADMIN — Medication 25 MILLIGRAM(S): at 06:15

## 2019-05-06 RX ADMIN — CHLORHEXIDINE GLUCONATE 1 APPLICATION(S): 213 SOLUTION TOPICAL at 06:13

## 2019-05-06 RX ADMIN — Medication 1 TABLET(S): at 12:26

## 2019-05-06 RX ADMIN — Medication 81 MILLIGRAM(S): at 12:26

## 2019-05-06 RX ADMIN — Medication 650 MILLIGRAM(S): at 10:25

## 2019-05-06 RX ADMIN — PANTOPRAZOLE SODIUM 40 MILLIGRAM(S): 20 TABLET, DELAYED RELEASE ORAL at 17:31

## 2019-05-06 RX ADMIN — Medication 500 MILLIGRAM(S): at 17:31

## 2019-05-06 RX ADMIN — Medication 500 MILLIGRAM(S): at 06:15

## 2019-05-06 RX ADMIN — POLYETHYLENE GLYCOL 3350 17 GRAM(S): 17 POWDER, FOR SOLUTION ORAL at 14:30

## 2019-05-06 RX ADMIN — SENNA PLUS 2 TABLET(S): 8.6 TABLET ORAL at 21:44

## 2019-05-06 RX ADMIN — PANTOPRAZOLE SODIUM 40 MILLIGRAM(S): 20 TABLET, DELAYED RELEASE ORAL at 06:15

## 2019-05-06 RX ADMIN — Medication 650 MILLIGRAM(S): at 23:37

## 2019-05-06 RX ADMIN — Medication 100 MILLIGRAM(S): at 12:26

## 2019-05-06 RX ADMIN — ZINC OXIDE 1 APPLICATION(S): 200 OINTMENT TOPICAL at 12:29

## 2019-05-06 RX ADMIN — CHLORHEXIDINE GLUCONATE 1 APPLICATION(S): 213 SOLUTION TOPICAL at 17:30

## 2019-05-06 RX ADMIN — Medication 650 MILLIGRAM(S): at 08:46

## 2019-05-06 RX ADMIN — Medication 100 MILLIGRAM(S): at 00:03

## 2019-05-06 NOTE — PROGRESS NOTE ADULT - ASSESSMENT
58 y/o female with multiple medical issues. H/H stable.  Abdomen soft, non-tender. GI bleeding subsided. EGD done on 4/24/19, no source of UGIB identified. S/P Colonoscopy 4/30/19, hemorrhoids seen on exam.

## 2019-05-06 NOTE — PROGRESS NOTE ADULT - ASSESSMENT
59 yr old female PMHx of MVP, chronic SDH, interstitial lung disease/pneumothorax, pulmonary nodules, meniere's, non hodgkins lymphoma (SCD/XRT/chemo at [facility] 2003), TIA, tachycardia, occipital neuralgia, and neuropathy being admitted to the ICU for acute GI bleeding with hypotension    #Hypovolemic shock 2/2 GIB and acute blood loss anemia  resolved     #Left HCAP vs Aspiration PNA  resolved , off abx     #Acute blood loss anemia/GIB  resolved, h/h stable    #CVA - cont. asa     #Seizures - on valproic acid    #Sinus tachycardia- stable    OT saw patient today , recommendation of 2-3x/wk rehab  acute rehab vs [facility]

## 2019-05-06 NOTE — PROGRESS NOTE ADULT - SUBJECTIVE AND OBJECTIVE BOX
ROMIE VIRAMONTES  59y  Female    Patient is a 59y old  Female who presents with a chief complaint of Rectal bleed (05 May 2019 10:51)    Pt seen and examined, no acute events overnight     PAST MEDICAL & SURGICAL HISTORY:  Interstitial lung disease: on home o2 prn  NHL (non-Hodgkin's lymphoma): Agem 43 sp chemo/rt/stem cell  Transient cerebral ischemia, unspecified type  Mitral prolapse  History of tonsillectomy  History of appendectomy        MedsMEDICATIONS  (STANDING):  aspirin enteric coated 81 milliGRAM(s) Oral daily  buDESOnide 160 MICROgram(s)/formoterol 4.5 MICROgram(s) Inhaler 2 Puff(s) Inhalation every 12 hours  chlorhexidine 4% Liquid 1 Application(s) Topical every 12 hours  lidocaine   Patch 1 Patch Transdermal daily  metoprolol tartrate 25 milliGRAM(s) Oral two times a day  multivitamin 1 Tablet(s) Oral daily  pantoprazole  Injectable 40 milliGRAM(s) IV Push every 12 hours  tiotropium 18 MICROgram(s) Capsule 1 Capsule(s) Inhalation daily  valproic acid 500 milliGRAM(s) Oral every 12 hours  zinc oxide 40% Ointment 1 Application(s) Topical daily    MEDICATIONS  (PRN):  acetaminophen   Tablet .. 650 milliGRAM(s) Oral every 6 hours PRN Temp greater or equal to 38C (100.4F), Mild Pain (1 - 3)  ALBUTerol/ipratropium for Nebulization 3 milliLiter(s) Nebulizer every 6 hours PRN Shortness of Breath and/or Wheezing  docusate sodium 100 milliGRAM(s) Oral daily PRN Constipation  guaiFENesin    Syrup 100 milliGRAM(s) Oral every 6 hours PRN Cough  guaiFENesin    Syrup 200 milliGRAM(s) Oral every 6 hours PRN Cough  haloperidol    Injectable 2.5 milliGRAM(s) IntraMuscular once PRN agitation  ondansetron Injectable 4 milliGRAM(s) IV Push every 6 hours PRN Nausea and/or Vomiting  sodium chloride 0.9% lock flush 10 milliLiter(s) IV Push every 1 hour PRN Pre/post blood products, medications, blood draw, and to maintain line patency      Vital Signs Last 24 Hrs  T(C): 36.4 (06 May 2019 05:14), Max: 36.7 (05 May 2019 12:09)  T(F): 97.5 (06 May 2019 05:14), Max: 98.1 (05 May 2019 17:17)  HR: 86 (06 May 2019 09:18) (77 - 98)  BP: 127/75 (06 May 2019 05:14) (91/61 - 127/75)  BP(mean): 68 (05 May 2019 12:09) (68 - 68)  RR: 15 (06 May 2019 05:14) (15 - 16)  SpO2: 96% (06 May 2019 09:18) (95% - 98%)    PHYSICAL EXAM:  NERVOUS SYSTEM:  Alert & Oriented X3  CHEST/LUNG: Clear lungs b/l  HEART: S1S2, RRR   ABDOMEN: Soft, non-tender, non-distended, + bowel sounds  EXTREMITIES:  2+ Peripheral Pulses, No clubbing, cyanosis, or edema    LABS:                          11.8   8.2   )-----------( 391      ( 06 May 2019 07:10 )             38.1       05-06    140  |  102  |  25<H>  ----------------------------<  78  4.0   |  31  |  0.82    Ca    9.6      06 May 2019 07:10                                      RADIOLOGY & ADDITIONAL TESTS:    Imaging Personally Reviewed:  [x ] YES  [ ] NO      HEALTH ISSUES - PROBLEM Dx:  Transient cerebral ischemia, unspecified type: Transient cerebral ischemia, unspecified type  Anemia due to blood loss: Anemia due to blood loss  GI bleed: GI bleed  Hypovolemic shock: Hypovolemic shock  Rectal bleed: Rectal bleed          Care Discussed with Consultants/Other Providers [ x] YES  [ ] NO

## 2019-05-06 NOTE — PROGRESS NOTE ADULT - SUBJECTIVE AND OBJECTIVE BOX
INTERVAL HPI/OVERNIGHT EVENTS:  HPI:  58 y/o female PMHx of MVP, chronic SDH, interstitial lung disease/pneumothorax, pulmonary nodules, meniere's, non hodgkins lymphoma (SCD/XRT/chemo at [facility] 2003), TIA, tachycardia, occipital neuralgia, and neuropathy.Patient seen and examined at bedside in [facility]. Denies nausea, vomiting, abdominal pain. No further episodes of GI bleeding noted. She complains of some constipation.       MEDICATIONS  (STANDING):  aspirin enteric coated 81 milliGRAM(s) Oral daily  buDESOnide 160 MICROgram(s)/formoterol 4.5 MICROgram(s) Inhaler 2 Puff(s) Inhalation every 12 hours  chlorhexidine 4% Liquid 1 Application(s) Topical every 12 hours  docusate sodium 100 milliGRAM(s) Oral daily  lidocaine   Patch 1 Patch Transdermal daily  metoprolol tartrate 25 milliGRAM(s) Oral two times a day  multivitamin 1 Tablet(s) Oral daily  pantoprazole  Injectable 40 milliGRAM(s) IV Push every 12 hours  senna 2 Tablet(s) Oral at bedtime  tiotropium 18 MICROgram(s) Capsule 1 Capsule(s) Inhalation daily  valproic acid 500 milliGRAM(s) Oral every 12 hours  zinc oxide 40% Ointment 1 Application(s) Topical daily    MEDICATIONS  (PRN):  acetaminophen   Tablet .. 650 milliGRAM(s) Oral every 6 hours PRN Temp greater or equal to 38C (100.4F), Mild Pain (1 - 3)  ALBUTerol/ipratropium for Nebulization 3 milliLiter(s) Nebulizer every 6 hours PRN Shortness of Breath and/or Wheezing  docusate sodium 100 milliGRAM(s) Oral daily PRN Constipation  guaiFENesin    Syrup 100 milliGRAM(s) Oral every 6 hours PRN Cough  guaiFENesin    Syrup 200 milliGRAM(s) Oral every 6 hours PRN Cough  haloperidol    Injectable 2.5 milliGRAM(s) IntraMuscular once PRN agitation  ondansetron Injectable 4 milliGRAM(s) IV Push every 6 hours PRN Nausea and/or Vomiting  polyethylene glycol 3350 17 Gram(s) Oral daily PRN Constipation  sodium chloride 0.9% lock flush 10 milliLiter(s) IV Push every 1 hour PRN Pre/post blood products, medications, blood draw, and to maintain line patency      Allergies    IV Contrast (Anaphylaxis)  shellfish. (Anaphylaxis)    Intolerances        PHYSICAL EXAM:   Vital Signs:  Vital Signs Last 24 Hrs  T(C): 36.4 (06 May 2019 05:14), Max: 36.7 (05 May 2019 17:17)  T(F): 97.5 (06 May 2019 05:14), Max: 98.1 (05 May 2019 17:17)  HR: 86 (06 May 2019 09:18) (77 - 98)  BP: 127/75 (06 May 2019 05:14) (115/68 - 127/75)  BP(mean): --  RR: 15 (06 May 2019 05:14) (15 - 15)  SpO2: 96% (06 May 2019 09:18) (96% - 98%)  Daily     Daily I&O's Summary    05 May 2019 07:01  -  06 May 2019 07:00  --------------------------------------------------------  IN: 1220 mL / OUT: 0 mL / NET: 1220 mL    06 May 2019 07:01  -  06 May 2019 14:52  --------------------------------------------------------  IN: 320 mL / OUT: 0 mL / NET: 320 mL    GENERAL:  Appears stated age,  HEENT:  NC/AT,  conjunctivae clear and pink  CHEST:  Full & symmetric excursion, no increased effort, breath sounds clear  HEART:  Regular rhythm, S1, S2  ABDOMEN:  Soft, non-tender, non-distended, normoactive bowel sounds  EXTEREMITIES:  no edema, L side weakness, +pulses   SKIN:  No rash, warm/dry  NEURO:  Alert, oriented, lethargic     LABS:                        11.8   8.2   )-----------( 391      ( 06 May 2019 07:10 )             38.1     05-06    140  |  102  |  25<H>  ----------------------------<  78  4.0   |  31  |  0.82    Ca    9.6      06 May 2019 07:10          amylase   lipase  RADIOLOGY & ADDITIONAL TESTS:

## 2019-05-06 NOTE — PROGRESS NOTE ADULT - SUBJECTIVE AND OBJECTIVE BOX
This is a 60 yo female with PMHx of MVP, chronic SDH, interstitial lung disease/pneumothorax, pulmonary nodules, meniere's, non hodgkins lymphoma (SCD/XRT/chemo at [facility] 2003), TIA, tachycardia, occipital neuralgia, and neuropathy.  Patient was initially admitted to [place] on 3/13/19 with acute onset of b/l UE weakness associated with nausea, blurry vision, and HA. CT head showed chronic B/L frontal SDH. The rest of the workup was negative. CVA/TIA r/out.  Pt was discharged home but on 3/17 had apparent seizure.  She was admitted to [facility] and intubated and placed on depakote.  CTA H/N, MRI, EEG unremarkable.  ID Recommended LP for fever; family deferred.  Transferred to [facility] on 3/19/19.  MRI brain showed cerebral ischemia. The rest of the workup was negative, this including CSF studies. EEG neg seizures. (Evaluated by lymphoma specialist/onco, PET 4/5/19 r/o recurrence of lymphoma. Increased signal at base of tongue to follow up outpt ENT). Additionally, course complicated with pneumothorax, pulmonary consulted, no interventions, self resolved.  Tachycardia - per pulm, related related to chronic lung disease.  GI consulted for rectal pain; diagnosed c anal fissure/constipation, bowel regimen started. KUB 4/12/19 No stool, no free air, no distended loops of small bowel, fibroids. Refused further workup.   Other issues while admitted:  Hypotension - home diltiazem held per cardio  dizziness - sporadic, self resolving.  Pt deferred MRI.  May be 2/2 to hx of Meniere's disease.      *TTE 3/18/19 EF 60%, mod-severe MR.   MRI brain 3/29/19 cortically based restricted diffusion within R>L frontoparietal region as well as additional scattered small foci, may represent evolution of a recent ischemia vs improving encephalitis. Patient medically optimized and cleared for discharge to acute rehab at [facility] on 4/19/19.   On BIU at [facility], patient presented with rectal pain, fluctuating tachycardia 110-150 at rest and orthostatic hypotension. GI, hematology, cardiology and pulmonary evaluation requested. Rectal bleeding developed in the morning of 4/21/19 with severe hypotension and tachycardia. RRT called. Patient transferred to ICU. Patient's course in ICU reviewed and discussed with staff. S/p multiple PRBC transfusions. S/p EGD and cscope to evaluate source of GI bleed. Studies were negative and with only hemmorhoids found. GI recommends outpatient small capsule endoscopy. ID had also been following patient for leukocytosis. S/p vanco and zosyn. WBC has now returned to normal. No identifiable source of infection. CT scan of chest showed basilar changes but unclear if changes/infiltrates were related to chronic lung disease vs pneumonia.   Physical therapy last worked with patient on 5/3. During session, patient complained of dizziness with standing. She requested to return to a lying position. During the session she was able to perform bed mobilty with mod A x1, sit to stand transfer with mod A x 2 and took 1 step with mod Ax2. Patient found to have  decreased opal;  decreased velocity of limb motion;  decreased step length;  decreased toe clearance;  decreased strength;  impaired balance;  impaired coordination;  impaired motor control;  and impaired postural control.  OT saw patient this morning, 5/6, for evaluation. Patient found to be dependent for upper body dressing with assist on 1 person. Dependent with toileting with one person assist. Was able to perform bed mobility mod Ax1.   PM&R follow up visit today.       PAST MEDICAL & SURGICAL HISTORY:  Interstitial lung disease: on home o2 prn  NHL (non-Hodgkin's lymphoma): Agem 45 sp chemo/rt/stem cell  Transient cerebral ischemia, unspecified type  Mitral prolapse  History of tonsillectomy  History of appendectomy       MEDICATIONS  (STANDING):  aspirin enteric coated 81 milliGRAM(s) Oral daily  buDESOnide 160 MICROgram(s)/formoterol 4.5 MICROgram(s) Inhaler 2 Puff(s) Inhalation every 12 hours  chlorhexidine 4% Liquid 1 Application(s) Topical every 12 hours  docusate sodium 100 milliGRAM(s) Oral daily  lidocaine   Patch 1 Patch Transdermal daily  metoprolol tartrate 25 milliGRAM(s) Oral two times a day  multivitamin 1 Tablet(s) Oral daily  pantoprazole  Injectable 40 milliGRAM(s) IV Push every 12 hours  senna 2 Tablet(s) Oral at bedtime  tiotropium 18 MICROgram(s) Capsule 1 Capsule(s) Inhalation daily  valproic acid 500 milliGRAM(s) Oral every 12 hours  zinc oxide 40% Ointment 1 Application(s) Topical daily    MEDICATIONS  (PRN):  acetaminophen   Tablet .. 650 milliGRAM(s) Oral every 6 hours PRN Temp greater or equal to 38C (100.4F), Mild Pain (1 - 3)  ALBUTerol/ipratropium for Nebulization 3 milliLiter(s) Nebulizer every 6 hours PRN Shortness of Breath and/or Wheezing  docusate sodium 100 milliGRAM(s) Oral daily PRN Constipation  guaiFENesin    Syrup 100 milliGRAM(s) Oral every 6 hours PRN Cough  guaiFENesin    Syrup 200 milliGRAM(s) Oral every 6 hours PRN Cough  haloperidol    Injectable 2.5 milliGRAM(s) IntraMuscular once PRN agitation  ondansetron Injectable 4 milliGRAM(s) IV Push every 6 hours PRN Nausea and/or Vomiting  polyethylene glycol 3350 17 Gram(s) Oral daily PRN Constipation  sodium chloride 0.9% lock flush 10 milliLiter(s) IV Push every 1 hour PRN Pre/post blood products, medications, blood draw, and to maintain line patency                          05-06    140  |  102  |  25<H>  ----------------------------<  78  4.0   |  31  |  0.82    Ca    9.6      06 May 2019 07:10                          11.8   8.2   )-----------( 391      ( 06 May 2019 07:10 )             38.1       Vital Signs Last 24 Hrs  T(C): 36.4 (06 May 2019 05:14), Max: 36.7 (05 May 2019 12:09)  T(F): 97.5 (06 May 2019 05:14), Max: 98.1 (05 May 2019 17:17)  HR: 86 (06 May 2019 09:18) (77 - 98)  BP: 127/75 (06 May 2019 05:14) (91/61 - 127/75)  BP(mean): 68 (05 May 2019 12:09) (68 - 68)  RR: 15 (06 May 2019 05:14) (15 - 16)  SpO2: 96% (06 May 2019 09:18) (95% - 98%)      Vital trends over the last 4 days:  SBPs 91-120s  HR 77-99      Physical exam-  General-fragile  Neck - Supple, No limited ROM  Chest - Decreased breath sounds.  Cardiovascular - tachycardia, No murmurs  Abdomen - BS+, Soft, NTND  Extremities - No C/C/E, No calf tenderness   Skin-no rash      Neurologic Exam -  AAO to person, place and time, VFF, no dysphagia, dysarthria, spastic quadriparesis, more pronounced in left UE 1-2/5, right UE 4/5 and lower extremity 4/5 with poor effort.  Impaired LT sensation in left UEs and LEs. DTRs symmetric. Toes mute. FTN coordination intact on the right; unable on the left due to severe weakness.                Balance - untested.      Gait- deferred/unable      Psychiatric - Emotional, depressed, anxious. This is a 58 yo female with PMHx of MVP, chronic SDH, interstitial lung disease/pneumothorax, pulmonary nodules, meniere's, non hodgkins lymphoma (SCD/XRT/chemo at [facility] 2003), TIA, tachycardia, occipital neuralgia, and neuropathy.  Patient was initially admitted to [place] on 3/13/19 with acute onset of b/l UE weakness associated with nausea, blurry vision, and HA. CT head showed chronic B/L frontal SDH. The rest of the workup was negative. CVA/TIA r/out.  Pt was discharged home but on 3/17 had apparent seizure.  She was admitted to [facility] and intubated and placed on depakote.  CTA H/N, MRI, EEG unremarkable.  ID Recommended LP for fever; family deferred.  Transferred to [facility] on 3/19/19.  MRI brain showed cerebral ischemia. The rest of the workup was negative, this including CSF studies. EEG neg seizures. (Evaluated by lymphoma specialist/onco, PET 4/5/19 r/o recurrence of lymphoma. Increased signal at base of tongue to follow up outpt ENT). Additionally, course complicated with pneumothorax, pulmonary consulted, no interventions, self resolved.  Tachycardia - per pulm, related related to chronic lung disease.  GI consulted for rectal pain; diagnosed c anal fissure/constipation, bowel regimen started. KUB 4/12/19 No stool, no free air, no distended loops of small bowel, fibroids. Refused further workup.   Other issues while admitted:  Hypotension - home diltiazem held per cardio  dizziness - sporadic, self resolving.  Pt deferred MRI.  May be 2/2 to hx of Meniere's disease.      *TTE 3/18/19 EF 60%, mod-severe MR.   MRI brain 3/29/19 cortically based restricted diffusion within R>L frontoparietal region as well as additional scattered small foci, may represent evolution of a recent ischemia vs improving encephalitis. Patient medically optimized and cleared for discharge to acute rehab at [facility] on 4/19/19.   On BIU at [facility], patient presented with rectal pain, fluctuating tachycardia 110-150 at rest and orthostatic hypotension. GI, hematology, cardiology and pulmonary evaluation requested. Rectal bleeding developed in the morning of 4/21/19 with severe hypotension and tachycardia. RRT called. Patient transferred to ICU. Patient's course in ICU reviewed and discussed with staff. S/p multiple PRBC transfusions. S/p EGD and cscope to evaluate source of GI bleed. Studies were negative and with only hemmorhoids found. GI recommends outpatient small capsule endoscopy. ID had also been following patient for leukocytosis. S/p vanco and zosyn. WBC has now returned to normal. No identifiable source of infection. CT scan of chest showed basilar changes but unclear if changes/infiltrates were related to chronic lung disease vs pneumonia.   Physical therapy last worked with patient on 5/3. During session, patient complained of dizziness with standing. She requested to return to a lying position. During the session she was able to perform bed mobilty with mod A x1, sit to stand transfer with mod A x 2 and took 1 step with mod Ax2. Patient found to have  decreased opal;  decreased velocity of limb motion;  decreased step length;  decreased toe clearance;  decreased strength;  impaired balance;  impaired coordination;  impaired motor control;  and impaired postural control.  OT saw patient this morning, 5/6, for evaluation. Patient found to be dependent for upper body dressing with assist on 1 person. Dependent with toileting with one person assist. Was able to perform bed mobility mod Ax1.   PM&R follow up visit today.       PAST MEDICAL & SURGICAL HISTORY:  Interstitial lung disease: on home o2 prn  NHL (non-Hodgkin's lymphoma): Agem 43 sp chemo/rt/stem cell  Transient cerebral ischemia, unspecified type  Mitral prolapse  History of tonsillectomy  History of appendectomy     TODAY'S SUBJECTIVE & REVIEW OF SYMPTOMS:  Patient reports very poor sleep and reports severe fatigue. Denies HAs, vision or hearing changes, CP, SOB, dysuria, incontinence. Denies fevers and chills. States she has dizziness while sitting up for meals. Dizziness resolves with lying down.    +Anxiety and constipation x2-3days.     MEDICATIONS  (STANDING):  aspirin enteric coated 81 milliGRAM(s) Oral daily  buDESOnide 160 MICROgram(s)/formoterol 4.5 MICROgram(s) Inhaler 2 Puff(s) Inhalation every 12 hours  chlorhexidine 4% Liquid 1 Application(s) Topical every 12 hours  docusate sodium 100 milliGRAM(s) Oral daily  lidocaine   Patch 1 Patch Transdermal daily  metoprolol tartrate 25 milliGRAM(s) Oral two times a day  multivitamin 1 Tablet(s) Oral daily  pantoprazole  Injectable 40 milliGRAM(s) IV Push every 12 hours  senna 2 Tablet(s) Oral at bedtime  tiotropium 18 MICROgram(s) Capsule 1 Capsule(s) Inhalation daily  valproic acid 500 milliGRAM(s) Oral every 12 hours  zinc oxide 40% Ointment 1 Application(s) Topical daily    MEDICATIONS  (PRN):  acetaminophen   Tablet .. 650 milliGRAM(s) Oral every 6 hours PRN Temp greater or equal to 38C (100.4F), Mild Pain (1 - 3)  ALBUTerol/ipratropium for Nebulization 3 milliLiter(s) Nebulizer every 6 hours PRN Shortness of Breath and/or Wheezing  docusate sodium 100 milliGRAM(s) Oral daily PRN Constipation  guaiFENesin    Syrup 100 milliGRAM(s) Oral every 6 hours PRN Cough  guaiFENesin    Syrup 200 milliGRAM(s) Oral every 6 hours PRN Cough  haloperidol    Injectable 2.5 milliGRAM(s) IntraMuscular once PRN agitation  ondansetron Injectable 4 milliGRAM(s) IV Push every 6 hours PRN Nausea and/or Vomiting  polyethylene glycol 3350 17 Gram(s) Oral daily PRN Constipation  sodium chloride 0.9% lock flush 10 milliLiter(s) IV Push every 1 hour PRN Pre/post blood products, medications, blood draw, and to maintain line patency                          05-06    140  |  102  |  25<H>  ----------------------------<  78  4.0   |  31  |  0.82    Ca    9.6      06 May 2019 07:10                          11.8   8.2   )-----------( 391      ( 06 May 2019 07:10 )             38.1       Vital Signs Last 24 Hrs  T(C): 36.4 (06 May 2019 05:14), Max: 36.7 (05 May 2019 12:09)  T(F): 97.5 (06 May 2019 05:14), Max: 98.1 (05 May 2019 17:17)  HR: 86 (06 May 2019 09:18) (77 - 98)  BP: 127/75 (06 May 2019 05:14) (91/61 - 127/75)  BP(mean): 68 (05 May 2019 12:09) (68 - 68)  RR: 15 (06 May 2019 05:14) (15 - 16)  SpO2: 96% (06 May 2019 09:18) (95% - 98%)      Vital trends over the last 4 days:  SBPs 91-120s  HR 77-99      Physical exam-  General-fragile, fatigued   Neck - Supple, No limited ROM  Chest - Decreased breath sounds.  Cardiovascular - RRR, No murmurs  Abdomen - BS+, Soft, NTND  Extremities - No C/C/E, No calf tenderness     Neurologic Exam -  AAO to person, place and time, VFF, dysarthria, spastic quadriparesis, more pronounced in left UE 1-2/5, right lower extremity 4/5 with poor effort.  Patient was able to lift left arm by the shoulder 3/5 strength, able to straighten left elbow with gravity eliminated 2/5. Unable to close left hand or bring hand to chest with gravity eliminated -very poor effort during exam, patient did close eyes several times    Impaired LT sensation in left UEs and LEs. DTRs symmetric.  FTN coordination intact on the right; unable on the left due to severe weakness.                Balance - untested.      Gait- deferred/unable      Psychiatric - Emotional, depressed, anxious.     Functional Exam: patient refuses to trial bed mobility after several attempts of encouragement.

## 2019-05-06 NOTE — PROGRESS NOTE ADULT - ASSESSMENT
This is a 60 yo right handed female with recent right>left cerebral hemispheric process resulted in fluctuating spastic quadriparesis, with left hemisensory deficits and gait impairment and seizures. In the context of multiple medical complex co-morbidities. Extensive workup at [place] and [facility] included multiple brain imaging, PET scan, CT Abd pelvis, CSF analysis, multiple EEGs. No definitive diagnosis has been reached. Differential diagnosis options included ischemic vs inflammatory, infectious vs paraneoplastic etiologies. S/p EGD and cscope with no obvious signs of bleeding.       Patient remains physically fragile and unable to tolerate acute rehabilitation program requiring 3 hours of intensive daily therapy. From neurological standpoint I would suggest to repeat brain imaging, obtain paraneoplastic panel and consider repeating CSF analysis. Case discussed with ICU attending and Neurology attending, nursing staff. When investigations are completed and patient is medically stable, NEHA placement should be considered. This is a 58 yo right handed female with recent right>left cerebral hemispheric process resulted in fluctuating spastic quadriparesis, with left hemisensory deficits and gait impairment and seizures. In the context of multiple medical complex co-morbidities. Extensive workup at [place] and [facility] included multiple brain imaging, PET scan, CT Abd pelvis, CSF analysis, multiple EEGs. No definitive diagnosis has been reached. Differential diagnosis options included ischemic vs inflammatory, infectious vs paraneoplastic etiologies. S/p EGD and cscope with no obvious signs of bleeding.       Patient remains physically fragile. Patient has not been able to tolerate bedside therapies without dizziness and requesting to lay back in bed. Will likely have difficulty tolerating a 3 hour a day, 5 day a week acute IPR program.  Patient does have improved WBC. HR 70-90s and SBP 90-120s at rest. H/H has stabilized.   Patient is medically stable for discharge off of medical unit.   Will see how patient participates with physical therapy later today.   Will discuss findings with Dr Walton. May consider for 3-5 day stay to trial acute IPR program.

## 2019-05-07 ENCOUNTER — TRANSCRIPTION ENCOUNTER (OUTPATIENT)
Age: 59
End: 2019-05-07

## 2019-05-07 PROCEDURE — 90792 PSYCH DIAG EVAL W/MED SRVCS: CPT

## 2019-05-07 PROCEDURE — 99233 SBSQ HOSP IP/OBS HIGH 50: CPT

## 2019-05-07 RX ORDER — METOPROLOL TARTRATE 50 MG
0.5 TABLET ORAL
Qty: 0 | Refills: 0 | COMMUNITY

## 2019-05-07 RX ORDER — SENNA PLUS 8.6 MG/1
2 TABLET ORAL
Qty: 0 | Refills: 0 | DISCHARGE
Start: 2019-05-07

## 2019-05-07 RX ORDER — ASPIRIN/CALCIUM CARB/MAGNESIUM 324 MG
1 TABLET ORAL
Qty: 0 | Refills: 0 | DISCHARGE
Start: 2019-05-07

## 2019-05-07 RX ORDER — IPRATROPIUM/ALBUTEROL SULFATE 18-103MCG
3 AEROSOL WITH ADAPTER (GRAM) INHALATION
Qty: 0 | Refills: 0 | DISCHARGE
Start: 2019-05-07

## 2019-05-07 RX ORDER — VALPROIC ACID (AS SODIUM SALT) 250 MG/5ML
2 SOLUTION, ORAL ORAL
Qty: 0 | Refills: 0 | DISCHARGE
Start: 2019-05-07

## 2019-05-07 RX ORDER — ESCITALOPRAM OXALATE 10 MG/1
10 TABLET, FILM COATED ORAL DAILY
Qty: 0 | Refills: 0 | Status: DISCONTINUED | OUTPATIENT
Start: 2019-05-07 | End: 2019-05-07

## 2019-05-07 RX ORDER — DOCUSATE SODIUM 100 MG
1 CAPSULE ORAL
Qty: 0 | Refills: 0 | DISCHARGE
Start: 2019-05-07

## 2019-05-07 RX ORDER — METOPROLOL TARTRATE 50 MG
1 TABLET ORAL
Qty: 0 | Refills: 0 | DISCHARGE
Start: 2019-05-07

## 2019-05-07 RX ADMIN — TIOTROPIUM BROMIDE 1 CAPSULE(S): 18 CAPSULE ORAL; RESPIRATORY (INHALATION) at 11:24

## 2019-05-07 RX ADMIN — SENNA PLUS 2 TABLET(S): 8.6 TABLET ORAL at 22:13

## 2019-05-07 RX ADMIN — Medication 100 MILLIGRAM(S): at 12:15

## 2019-05-07 RX ADMIN — Medication 500 MILLIGRAM(S): at 17:20

## 2019-05-07 RX ADMIN — PANTOPRAZOLE SODIUM 40 MILLIGRAM(S): 20 TABLET, DELAYED RELEASE ORAL at 05:48

## 2019-05-07 RX ADMIN — Medication 1 TABLET(S): at 12:15

## 2019-05-07 RX ADMIN — Medication 500 MILLIGRAM(S): at 05:48

## 2019-05-07 RX ADMIN — Medication 25 MILLIGRAM(S): at 05:48

## 2019-05-07 RX ADMIN — ZINC OXIDE 1 APPLICATION(S): 200 OINTMENT TOPICAL at 16:02

## 2019-05-07 RX ADMIN — BUDESONIDE AND FORMOTEROL FUMARATE DIHYDRATE 2 PUFF(S): 160; 4.5 AEROSOL RESPIRATORY (INHALATION) at 11:24

## 2019-05-07 RX ADMIN — ONDANSETRON 4 MILLIGRAM(S): 8 TABLET, FILM COATED ORAL at 09:40

## 2019-05-07 RX ADMIN — PANTOPRAZOLE SODIUM 40 MILLIGRAM(S): 20 TABLET, DELAYED RELEASE ORAL at 17:17

## 2019-05-07 NOTE — PROGRESS NOTE ADULT - ASSESSMENT
58 y/o female with multiple medical issues. H/H stable.  Abdomen soft, non-tender. GI bleeding subsided. EGD done on 4/24/19, no source of UGIB identified. S/P Colonoscopy 4/30/19, hemorrhoids seen on exam. +BM after Miralax

## 2019-05-07 NOTE — DISCHARGE NOTE PROVIDER - HOSPITAL COURSE
59 yr old female PMHx of MVP, chronic SDH, interstitial lung disease/pneumothorax, pulmonary nodules, meniere's, non hodgkins lymphoma (SCD/XRT/chemo at [facility] 2003), TIA, tachycardia, occipital neuralgia, and neuropathy being admitted to the ICU for acute GI bleeding with hypotension        #Hypovolemic shock 2/2 GIB and acute blood loss anemia    resolved         #Left HCAP vs Aspiration PNA    resolved , off abx         #Acute blood loss anemia/GIB    resolved, h/h stable        #CVA - cont. asa         #Seizures - on valproic acid        #Sinus tachycardia- stable        #Anxiety/depression     psych called

## 2019-05-07 NOTE — CONSULT NOTE ADULT - CONSULT REQUESTED DATE/TIME
01-May-2019 12:00
07-May-2019 16:00
23-Apr-2019 11:41
23-Apr-2019 14:15
27-Apr-2019 17:28
30-Apr-2019 14:05
24-Apr-2019 09:58

## 2019-05-07 NOTE — PROGRESS NOTE ADULT - SUBJECTIVE AND OBJECTIVE BOX
CHIEF COMPLAINT: nausea/weakness.       HISTORY OF PRESENT ILLNESS  58 yo female with PMHx of MVP, chronic SDH, interstitial lung disease/pneumothorax, pulmonary nodules, meniere's, non hodgkins lymphoma (SCD/XRT/chemo at [facility] 2003), TIA, tachycardia, occipital neuralgia, and neuropathy.  Patient was initially admitted to [place] on 3/13/19 with acute onset of b/l UE weakness associated with nausea, blurry vision, and HA. CT head showed chronic B/L frontal SDH. The rest of the workup was negative. CVA/TIA r/out.  Pt was discharged home but on 3/17 had apparent seizure. She was admitted to [facility] and intubated and placed on Depakote  CTA H/N, MRI, EEG unremarkable.  ID Recommended LP for fever; family deferred.  Transferred to [facility] on 3/19/19.  MRI brain showed cerebral ischemia. The rest of the workup was negative, this including CSF studies. EEG neg seizures. (Evaluated by lymphoma specialist/onco, PET 4/5/19 r/o recurrence of lymphoma. Increased signal at base of tongue to follow up outpt ENT). Additionally, course complicated with pneumothorax, pulmonary consulted, no interventions, self resolved.  Tachycardia - per pulm, related to chronic lung disease.  GI consulted for rectal pain; diagnosed c anal fissure/constipation, bowel regimen started. KUB 4/12/19 No stool, no free air, no distended loops of small bowel, fibroids. Refused further workup.   Other issues while admitted: Hypotension - home diltiazem held per cardio  dizziness - sporadic, self resolving.  Pt deferred MRI.  May be 2/2 to hx of Meniere's disease. *TTE 3/18/19 EF 60%, mod-severe MR.   MRI brain 3/29/19 cortically based restricted diffusion within R>L frontoparietal region as well as additional scattered small foci, may represent evolution of a recent ischemia vs improving encephalitis. Patient medically optimized and cleared for discharge to acute rehab at [facility] on 4/19/19.   At [facility] patient presented with rectal pain, fluctuating tachycardia 110-150 at rest and orthostatic hypotension. GI, hematology, cardiology and pulmonary evaluation requested. Rectal bleeding developed in the morning of 4/21/19 with severe hypotension and tachycardia. RRT called. Patient transferred to ICU. Patient's course in ICU reviewed and discussed with staff. Investigations, current lab results and recent treatments also reviewed and discussed. Patient was attempted to be evaluated by PT twice but unable to tolerate evaluation due to severe tachycardia up to 150 at rest and symptomatic orthostatic hypotension as low as 80s. Patient is preparing for colonoscopy to evaluate the source of rectal bleed. S/p multiple PRBC transfusions. Course of IV antibiotics and IV steroids. Discharged from ICU to floor.       PAST MEDICAL & SURGICAL HISTORY:  Interstitial lung disease: on home o2 prn  NHL (non-Hodgkin's lymphoma), sp chemo/rt/stem cell  Transient cerebral ischemia, unspecified type  Mitral prolapse  History of tonsillectomy  History of appendectomy       REVIEW OF SYMPTOMS-   Nausea, weakness, dizziness, constipation. Chronic pain.       VITALS  Vital Signs Last 24 Hrs  T(C): 36.4 (07 May 2019 05:28), Max: 37.1 (07 May 2019 05:15)  T(F): 97.5 (07 May 2019 05:28), Max: 98.8 (07 May 2019 05:15)  HR: 49 (07 May 2019 11:27) (49 - 99)  BP: 124/81 (07 May 2019 05:28) (102/74 - 124/81)  BP(mean): --  RR: 15 (07 May 2019 05:28) (14 - 15)  SpO2: 97% (07 May 2019 11:27) (97% - 99%)    Physical exam-  General-fragile, weak.  Neck - Supple, No limited ROM  Chest - Decreased breath sounds.  Cardiovascular - No murmurs  Abdomen - BS+, Soft, NTND  Extremities - No C/C/E, No calf tenderness.   Skin-no rash      Neurologic Exam -  AAO to person, place and time, VFF, no dysphagia, dysarthria, spastic quadriparesis, more pronounced in left UE 1-2/5, right UE 4/5 and lower extremity 4/5 with poor effort.  Impaired LT sensation in left UEs and LEs. DTRs symmetric. Toes mute. FTN coordination intact on the right; unable on the left due to severe weakness.               Balance - untested.     Gait- deferred/unable.     Psychiatric - Emotional, depressed.         FUNCTIONAL PROGRESS-evaluated by PT/OT on 5/6/19. Able to perform sit to stand/transfer to chair 2 person assist (at least mod A).       RECENT LABS                        11.8   8.2   )-----------( 391      ( 06 May 2019 07:10 )             38.1     05-06    140  |  102  |  25<H>  ----------------------------<  78  4.0   |  31  |  0.82    Ca    9.6      06 May 2019 07:10            Direct LDL: 109 mg/dL (03-13-19 @ 11:50)      Valproic Acid Level, Serum: 60 ug/mL (04-22-19 @ 13:40)            RADIOLOGY/OTHER RESULTS      CURRENT MEDICATIONS  MEDICATIONS  (STANDING):  aspirin enteric coated 81 milliGRAM(s) Oral daily  buDESOnide 160 MICROgram(s)/formoterol 4.5 MICROgram(s) Inhaler 2 Puff(s) Inhalation every 12 hours  chlorhexidine 4% Liquid 1 Application(s) Topical every 12 hours  docusate sodium 100 milliGRAM(s) Oral daily  escitalopram 10 milliGRAM(s) Oral daily  lidocaine   Patch 1 Patch Transdermal daily  metoprolol tartrate 25 milliGRAM(s) Oral two times a day  multivitamin 1 Tablet(s) Oral daily  pantoprazole  Injectable 40 milliGRAM(s) IV Push every 12 hours  senna 2 Tablet(s) Oral at bedtime  tiotropium 18 MICROgram(s) Capsule 1 Capsule(s) Inhalation daily  valproic acid 500 milliGRAM(s) Oral every 12 hours  zinc oxide 40% Ointment 1 Application(s) Topical daily    MEDICATIONS  (PRN):  acetaminophen   Tablet .. 650 milliGRAM(s) Oral every 6 hours PRN Temp greater or equal to 38C (100.4F), Mild Pain (1 - 3)  ALBUTerol/ipratropium for Nebulization 3 milliLiter(s) Nebulizer every 6 hours PRN Shortness of Breath and/or Wheezing  docusate sodium 100 milliGRAM(s) Oral daily PRN Constipation  guaiFENesin    Syrup 200 milliGRAM(s) Oral every 6 hours PRN Cough  haloperidol    Injectable 2.5 milliGRAM(s) IntraMuscular once PRN agitation  ondansetron Injectable 4 milliGRAM(s) IV Push every 6 hours PRN Nausea and/or Vomiting  polyethylene glycol 3350 17 Gram(s) Oral daily PRN Constipation  sodium chloride 0.9% lock flush 10 milliLiter(s) IV Push every 1 hour PRN Pre/post blood products, medications, blood draw, and to maintain line patency

## 2019-05-07 NOTE — PROGRESS NOTE ADULT - ASSESSMENT
59 yr old female PMHx of MVP, chronic SDH, interstitial lung disease/pneumothorax, pulmonary nodules, meniere's, non hodgkins lymphoma (SCD/XRT/chemo at [facility] 2003), TIA, tachycardia, occipital neuralgia, and neuropathy being admitted to the ICU for acute GI bleeding with hypotension    #Hypovolemic shock 2/2 GIB and acute blood loss anemia  resolved     #Left HCAP vs Aspiration PNA  resolved , off abx     #Acute blood loss anemia/GIB  resolved, h/h stable    #CVA - cont. asa     #Seizures - on valproic acid    #Sinus tachycardia- stable    #Anxiety/depression   psych consult placed     acute rehab vs [facility]

## 2019-05-07 NOTE — CONSULT NOTE ADULT - CONSULT REASON
GI bleed
Anxiety and depression
GI Bleeds
GIB bleeding with hypotension
Pneumonia
neurorehab potential
Lower GI hemorrhage

## 2019-05-07 NOTE — PROGRESS NOTE ADULT - PROBLEM SELECTOR PLAN 1
Clear liquid diet  Continue PPI  CBC q 6 hours  Continue to monitor stools
Keep NPO  Continue PPI  CBC q 6 hours  Will proceed with upper endoscopy to r/o brisk bleed
2/2 GI bleed and acute blood loss anemia. s/p 4 PRBC, 2 Plt and 1 plasma  Actively titrating phenylephrine to maintain MAP >65.  Added Midodrine 5mg q8hr  Transfuse PRN. IVF hydration w/ D5 1/2 NS @ 75cc/hr
Clear liquid diet  Continue PPI  CBC q 6 hours  Continue to monitor stools
Clear liquid diet  NPO after midnight  Colonoscopy tomorrow with Dr. Francisco Stanford Prep at 15:00 and 21:00
EGD and Colonoscopy negative  H/H stable  Will need outpatient small capsule endoscopy eventually
NPO  Colonoscopy today
2/2 GI bleed and acute blood loss anemia. s/p 4 PRBC, 2 Plt and 1 plasma  Actively titrating phenylephrine to maintain MAP >65. will give additional fluid bolus PRN.  Transfuse PRN. IVF hydration w/ LR @ 100cc/hr

## 2019-05-07 NOTE — PROGRESS NOTE ADULT - ASSESSMENT
58 yo female with PMHx of MVP, chronic SDH, interstitial lung disease/pneumothorax, pulmonary nodules, meniere's, non hodgkins lymphoma (SCD/XRT/chemo at [facility] 2003), TIA, tachycardia, occipital neuralgia, recent rectal bleed, and neuropathy evaluated for admission to acute rehabilitation unit/BIU at [facility]. Patient fragile and weak at present; case complex medically and neurologically requiring further investigations. As per most recent therapy evaluations patient tolerates very limited therapy sessions. Requires 2 people assistance in her activities. After the evaluation of patient record to date and physical assessment, would offer trial of the acute rehab based on tolerance x 3-5 days. If patient not able to tolerate medically/participate in the intense 3h per day program, would ask to consider [facility] placement.

## 2019-05-07 NOTE — PROGRESS NOTE ADULT - SUBJECTIVE AND OBJECTIVE BOX
ROMIE VIRAMONTES  59y  Female    Patient is a 59y old  Female who presents with a chief complaint of Rectal bleed (06 May 2019 14:51)    pt seen and examined  no events overnight     PAST MEDICAL & SURGICAL HISTORY:  Interstitial lung disease: on home o2 prn  NHL (non-Hodgkin's lymphoma): Agem 43 sp chemo/rt/stem cell  Transient cerebral ischemia, unspecified type  Mitral prolapse  History of tonsillectomy  History of appendectomy        MedsMEDICATIONS  (STANDING):  aspirin enteric coated 81 milliGRAM(s) Oral daily  buDESOnide 160 MICROgram(s)/formoterol 4.5 MICROgram(s) Inhaler 2 Puff(s) Inhalation every 12 hours  chlorhexidine 4% Liquid 1 Application(s) Topical every 12 hours  docusate sodium 100 milliGRAM(s) Oral daily  lidocaine   Patch 1 Patch Transdermal daily  metoprolol tartrate 25 milliGRAM(s) Oral two times a day  multivitamin 1 Tablet(s) Oral daily  pantoprazole  Injectable 40 milliGRAM(s) IV Push every 12 hours  senna 2 Tablet(s) Oral at bedtime  tiotropium 18 MICROgram(s) Capsule 1 Capsule(s) Inhalation daily  valproic acid 500 milliGRAM(s) Oral every 12 hours  zinc oxide 40% Ointment 1 Application(s) Topical daily    MEDICATIONS  (PRN):  acetaminophen   Tablet .. 650 milliGRAM(s) Oral every 6 hours PRN Temp greater or equal to 38C (100.4F), Mild Pain (1 - 3)  ALBUTerol/ipratropium for Nebulization 3 milliLiter(s) Nebulizer every 6 hours PRN Shortness of Breath and/or Wheezing  docusate sodium 100 milliGRAM(s) Oral daily PRN Constipation  guaiFENesin    Syrup 200 milliGRAM(s) Oral every 6 hours PRN Cough  haloperidol    Injectable 2.5 milliGRAM(s) IntraMuscular once PRN agitation  ondansetron Injectable 4 milliGRAM(s) IV Push every 6 hours PRN Nausea and/or Vomiting  polyethylene glycol 3350 17 Gram(s) Oral daily PRN Constipation  sodium chloride 0.9% lock flush 10 milliLiter(s) IV Push every 1 hour PRN Pre/post blood products, medications, blood draw, and to maintain line patency      Vital Signs Last 24 Hrs  T(C): 36.4 (07 May 2019 05:28), Max: 37.1 (07 May 2019 05:15)  T(F): 97.5 (07 May 2019 05:28), Max: 98.8 (07 May 2019 05:15)  HR: 79 (07 May 2019 05:28) (79 - 99)  BP: 124/81 (07 May 2019 05:28) (102/74 - 124/81)  BP(mean): --  RR: 15 (07 May 2019 05:28) (14 - 15)  SpO2: 99% (07 May 2019 05:28) (96% - 99%)    PHYSICAL EXAM:  GENERAL: NAD  NERVOUS SYSTEM:  Alert & Oriented X3  CHEST/LUNG: Clear lungs b/l  HEART: S1S2, RRR   ABDOMEN: Soft, non-tender, non-distended, + bowel sounds  EXTREMITIES:  2+ Peripheral Pulses, No clubbing, cyanosis, or edema      LABS:                          11.8   8.2   )-----------( 391      ( 06 May 2019 07:10 )             38.1       05-06    140  |  102  |  25<H>  ----------------------------<  78  4.0   |  31  |  0.82    Ca    9.6      06 May 2019 07:10                                      RADIOLOGY & ADDITIONAL TESTS:    Imaging Personally Reviewed:  [ ] YES  [ ] NO      HEALTH ISSUES - PROBLEM Dx:  Constipation: Constipation  Transient cerebral ischemia, unspecified type: Transient cerebral ischemia, unspecified type  Anemia due to blood loss: Anemia due to blood loss  GI bleed: GI bleed  Hypovolemic shock: Hypovolemic shock  Rectal bleed: Rectal bleed          Care Discussed with Consultants/Other Providers [ x] YES  [ ] NO

## 2019-05-07 NOTE — DISCHARGE NOTE PROVIDER - CARE PROVIDER_API CALL
Jody Walton)  Neurology  [address] [place] [address]  Phone: [phone]  Fax: [phone]  Follow Up Time:

## 2019-05-07 NOTE — CHART NOTE - NSCHARTNOTEFT_GEN_A_CORE
NUTRITION FOLLOW UP    SOURCE: Patient [X)   Family [ ]     other [ ]    DIET: Regular lacto ovo vegeterian, ensure clear BID    PATIENT REPORT[ ] nausea  [ ] vomiting [ ] diarrhea [ ] constipation  [ ]chewing problems [ ] swallowing issues  [ ] other: no GI distress    PO INTAKE:  < 50% [ ]   50-75%  [X ]   %  []  other : Tolerates sips of ensure clear as observed.    SOURCE: for PO intake [X] Patient [ ] family [ ] chart [ ] staff [ ] other    ENTERAL/PARENTERAL NUTRITION: n/a    CURRENT WEIGHT: Weight (kg): 55.4 (05-07 @ 05:28)     PERTINENT LABS:  Date: 06 May 2019 07:10  Hemoglobin 11.8   Hematocrit 38.1     Date: 05-06  Sodium 140  Potassium 4.0  Glucose Serum 78  BUN 25<H>      Creatinine    ACCUCHECK      SKIN: INTACT, No edema present, Last BM was this am (complains of constipation) severe malnutrition noted    ESTIMATED NEEDS:   [X] no change since previous assessment  [ ] recalculated:     PREVIOUS NUTRITION DIAGNOSIS: addressed    NUTRITION DIAGNOSIS is   [X] ongoing    NEW NUTRITION DIAGNOSIS: [X] not applicable    MONITORING AND EVALUATION:   Current diet order is appropriate and is well tolerated, but will monitor for any changes that may be needed    [X] PO intake [X] Tolerance to diet prescription [X] weights [X] follow up per protocol    NUTRITION RECOMMENDATIONS: Encourage increased po intake incl. supplements. Suggested high fiber foods for constipation.    RD remains available BRENDAN Dougherty RD CDE

## 2019-05-07 NOTE — PROGRESS NOTE ADULT - SUBJECTIVE AND OBJECTIVE BOX
INTERVAL HPI/OVERNIGHT EVENTS:  HPI:  58 y/o female PMHx of MVP, chronic SDH, interstitial lung disease/pneumothorax, pulmonary nodules, meniere's, non hodgkins lymphoma (SCD/XRT/chemo at [facility] 2003), TIA, tachycardia, occipital neuralgia, and neuropathy. Patient seen and examined at bedside this morning. Denies nausea, vomiting, abdominal pain. No further episodes of GI bleeding noted. +BM yesterday.      MEDICATIONS  (STANDING):  aspirin enteric coated 81 milliGRAM(s) Oral daily  buDESOnide 160 MICROgram(s)/formoterol 4.5 MICROgram(s) Inhaler 2 Puff(s) Inhalation every 12 hours  chlorhexidine 4% Liquid 1 Application(s) Topical every 12 hours  docusate sodium 100 milliGRAM(s) Oral daily  lidocaine   Patch 1 Patch Transdermal daily  metoprolol tartrate 25 milliGRAM(s) Oral two times a day  multivitamin 1 Tablet(s) Oral daily  pantoprazole  Injectable 40 milliGRAM(s) IV Push every 12 hours  senna 2 Tablet(s) Oral at bedtime  tiotropium 18 MICROgram(s) Capsule 1 Capsule(s) Inhalation daily  valproic acid 500 milliGRAM(s) Oral every 12 hours  zinc oxide 40% Ointment 1 Application(s) Topical daily    MEDICATIONS  (PRN):  acetaminophen   Tablet .. 650 milliGRAM(s) Oral every 6 hours PRN Temp greater or equal to 38C (100.4F), Mild Pain (1 - 3)  ALBUTerol/ipratropium for Nebulization 3 milliLiter(s) Nebulizer every 6 hours PRN Shortness of Breath and/or Wheezing  docusate sodium 100 milliGRAM(s) Oral daily PRN Constipation  guaiFENesin    Syrup 200 milliGRAM(s) Oral every 6 hours PRN Cough  haloperidol    Injectable 2.5 milliGRAM(s) IntraMuscular once PRN agitation  ondansetron Injectable 4 milliGRAM(s) IV Push every 6 hours PRN Nausea and/or Vomiting  polyethylene glycol 3350 17 Gram(s) Oral daily PRN Constipation  sodium chloride 0.9% lock flush 10 milliLiter(s) IV Push every 1 hour PRN Pre/post blood products, medications, blood draw, and to maintain line patency      Allergies    IV Contrast (Anaphylaxis)  shellfish. (Anaphylaxis)    Intolerances        PHYSICAL EXAM:   Vital Signs:  Vital Signs Last 24 Hrs  T(C): 36.4 (07 May 2019 05:28), Max: 37.1 (07 May 2019 05:15)  T(F): 97.5 (07 May 2019 05:28), Max: 98.8 (07 May 2019 05:15)  HR: 79 (07 May 2019 05:28) (79 - 99)  BP: 124/81 (07 May 2019 05:28) (102/74 - 124/81)  BP(mean): --  RR: 15 (07 May 2019 05:28) (14 - 15)  SpO2: 99% (07 May 2019 05:28) (97% - 99%)  Daily     Daily I&O's Summary    06 May 2019 07:01  -  07 May 2019 07:00  --------------------------------------------------------  IN: 560 mL / OUT: 0 mL / NET: 560 mL    GENERAL:  Appears stated age,  HEENT:  NC/AT,  conjunctivae clear and pink  CHEST:  Full & symmetric excursion, no increased effort, breath sounds clear  HEART:  Regular rhythm, S1, S2  ABDOMEN:  Soft, non-tender, non-distended, normoactive bowel sounds  EXTEREMITIES:  no edema, L side weakness, +pulses   SKIN:  No rash, warm/dry  NEURO:  Alert, oriented,        LABS:                        11.8   8.2   )-----------( 391      ( 06 May 2019 07:10 )             38.1     05-06    140  |  102  |  25<H>  ----------------------------<  78  4.0   |  31  |  0.82    Ca    9.6      06 May 2019 07:10          amylase   lipase  RADIOLOGY & ADDITIONAL TESTS:

## 2019-05-07 NOTE — CONSULT NOTE ADULT - ATTENDING COMMENTS
Pt's daughter aware that the patient generally does not want to take CNS active medications, discussed use of stimulant for fatigue,   monitoring for depression and potential use of medication to slow down cognitive decline ( eg Exelon for vascular dementia).  Pt's daughter states she would not fight her mother on taking medications and would respect her wishes to decline.

## 2019-05-07 NOTE — PROGRESS NOTE ADULT - PROBLEM SELECTOR PLAN 2
s/p EGD w/out any significant findings, most likely lower GI bleed.  Bloody BM's have almost resolved. s/p 4 PRBC total.   Continue PPI BID  Diet advanced to clear liquid   f/u GI recommendations
Continue colace  Miralax PRN
Continue colace  Miralax PRN
s/p EGD today w/out any significant findings, most likely lower GI bleed.  Still having many bloody BMs. s/p 4 PRBC total. Would benefit from CT angiogram however pt is has anaphylaxis reaction to IV contrast. Possible NM bleeding scan in am if still actively bleeding.  Continue PPI BID  Keep NPO for now   f/u GI recommendations

## 2019-05-07 NOTE — PROGRESS NOTE ADULT - PROBLEM SELECTOR PROBLEM 1
Rectal bleed
Rectal bleed
Hypovolemic shock
Rectal bleed
Hypovolemic shock

## 2019-05-07 NOTE — CONSULT NOTE ADULT - SUBJECTIVE AND OBJECTIVE BOX
Source: Self, chart  Reliability: reliable, although unsure of timeline    Identifying Data: 58yo , retired female with HEALTH ISSUES - PROBLEM Dx:  Constipation  Transient cerebral ischemia, unspecified type  Anemia due to blood loss  GI bleed  Hypovolemic shock  Rectal bleed      Chief Complaint: "I don't take that kind of stuff."    History of Present Illness on Admission:  59 yr old female PMHx of MVP, chronic SDH, interstitial lung disease/pneumothorax, pulmonary nodules, meniere's, non hodgkins lymphoma (SCD/XRT/chemo at [facility] [place] ), TIA, tachycardia, occipital neuralgia, and neuropathy.  She was recently admitted here to [place] acute rehab on 19.  RRT was called early this AM because pt was noted to have 2 bloody BMs on diaper, with blood clots.  Pt denies any abdominal pain, chest pain, but very anxious.  She was tachycardic to 120's, BP 90/60, O2 sat was 98%, and she was afebrile.    She had several recent hospitalizations, and did state that she had one episode on hematochezia during last hospitalization, that resolved by itself.  Had been seen by GI for rectal pain, found to have anal fissure, hemorrhoids, constipation and was placed on a bowel regimen. Had a KUB 19 No stool, no free air, no distended loops of small bowel, fibroids. Refused further workup.  She never had a colonoscopy.    She was initially admitted to [place] on 3/13/19 with acute onset of b/l UE weakness associated with nausea, blurry vision, and HA. CT head showed chronic B/L frontal SDH. The rest of the workup was negative. CVA/TIA r/out.  Pt was discharged home but on 3/17 had apparent seizure.  She was admitted to [facility] and intubated and placed on depakote.  CTA H/N, MRI, EEG unremarkable.  ID Recommended LP for fever; family deferred.  Transferred to [facility] on 3/19/19.  MRI brain showed cerebral ischemia. The rest of the workup was negative, this including CSF studies. EEG neg seizures. Sukhdev had workup for PE and was neg. (Evaluated by lymphoma specialist/onco, PET 19 r/o recurrence of lymphoma. Increased signal at base of tongue to follow up outpt ENT). Additionally, course complicated with pneumothorax, pulmonary consulted, no interventions, self resolved.  Tachycardia - per pulm, related to chronic lung disease.  Had an echo 3/18/19 EF 60%, mod-severe MR, had MRI brain 3/29/19 cortically based restricted diffusion within R>L frontoparietal region as well as additional scattered small foci, may represent evolution of a recent ischemia vs improving encephalitis.   She was stabilized and d/bonnie here for rehab. (2019 05:31)      Psychiatric History of Present Illness:  Patient endorses a depressed mood. She became tearful when talking about her mother, saying that she wishes she could visit her more but that she's been stuck in the hospital. Patient also endorses reversal of day-night cycle, which makes it difficult for her to be seen by PT. She feels fatigued and would prefer to sleep all day, but cannot go to sleep at night. Patient also endorses intermittent anorexia. Sometimes she can eat her food, but other times it makes her nauseous just seeing the food. She endorses difficulty concentrating and left sided-partial paralysis ever since her recent b/l SDHs and cerebral ischemia. Patient endorses anhedonia, and states that she would "get better 15 times faster" if she was resting at home.    Patient also endorses worry/anxiety about her chronic health issues and her recent health issues. Patient states that she doesn't trust medical team because we tried to "sneak her" Lexapro without discussing the medication with her first.      Psychiatric Review of Systems:  See HPI  Patient endorsed that she used to have visual hallucinations (multiple televisions in her hospital room) when she was at [facility], but has not had any hallucinations since then.  Patient denies symptoms of eri.      Suicidality:  Has the patient ever thought about suicide?  Current suicidal thoughts?    Injury to others:  Does the patient express concern about hurting others?      Mental Status Exam:  General Appearance: Female patient in hospital gown and glasses lying in her bed; appears older that stated age.  Remarkable Features: none.	  Affect: mostly neutral, depressed at times (talking about mother). Congruent with mood.  Mood: depressed, worried.  Speech: decreased rate, normal tone, volume, and prosody.  Psychomotor state: No psychomotor agitation or retardation. Left-sided motor and sensory deficits s/p b/l SDHs and cerebral ischemia.  Abnormal Movements and posture: Decreased left-sided movements.  Cognition, Perceptual Abnormalities: MOCA = 15/30. Patient gained points in naming, attention (able to read list of digits, one correct instance of serial 3s), language repetition, abstraction, and 4/6 in orientation.  Consciousness: lethargic.  Orientation: oriented to person, month, year, place, and city. Not oriented to date and day of the week.  Memory: Recent/Past/Remote: Able to recall medical facts about her pasts and family details, although she doesn't remember timelines. Only able to remember 2/5 words on MOCA with multiple choice cues.  Attention: partially intact, patient was lethargic and would often close her eyes while being spoken to.  Judgment/Insight: fair.  Fund of Information/Intelligence: high-school education, appropriate vocabulary.    List abnormalities:  Ideas of reference, bizarre ideations, recurrent illusions, phobias, obsessions/compulsions: none elicited.  Hallucinations: Auditory/visual/tactile/olfactory/other: none elicited.  Delusions: Persecutory/somatic: thinks healthcare workers are trying to sneak her medication due to miscommunication regarding Lexapro.      MEDICATIONS  (STANDING):  aspirin enteric coated 81 milliGRAM(s) Oral daily  buDESOnide 160 MICROgram(s)/formoterol 4.5 MICROgram(s) Inhaler 2 Puff(s) Inhalation every 12 hours  chlorhexidine 4% Liquid 1 Application(s) Topical every 12 hours  docusate sodium 100 milliGRAM(s) Oral daily  lidocaine   Patch 1 Patch Transdermal daily  metoprolol tartrate 25 milliGRAM(s) Oral two times a day  multivitamin 1 Tablet(s) Oral daily  pantoprazole  Injectable 40 milliGRAM(s) IV Push every 12 hours  senna 2 Tablet(s) Oral at bedtime  tiotropium 18 MICROgram(s) Capsule 1 Capsule(s) Inhalation daily  valproic acid 500 milliGRAM(s) Oral every 12 hours  zinc oxide 40% Ointment 1 Application(s) Topical daily    MEDICATIONS  (PRN):  acetaminophen   Tablet .. 650 milliGRAM(s) Oral every 6 hours PRN Temp greater or equal to 38C (100.4F), Mild Pain (1 - 3)  ALBUTerol/ipratropium for Nebulization 3 milliLiter(s) Nebulizer every 6 hours PRN Shortness of Breath and/or Wheezing  docusate sodium 100 milliGRAM(s) Oral daily PRN Constipation  guaiFENesin    Syrup 200 milliGRAM(s) Oral every 6 hours PRN Cough  haloperidol    Injectable 2.5 milliGRAM(s) IntraMuscular once PRN agitation  ondansetron Injectable 4 milliGRAM(s) IV Push every 6 hours PRN Nausea and/or Vomiting  polyethylene glycol 3350 17 Gram(s) Oral daily PRN Constipation  sodium chloride 0.9% lock flush 10 milliLiter(s) IV Push every 1 hour PRN Pre/post blood products, medications, blood draw, and to maintain line patency      Allergies:  IV Contrast (Anaphylaxis)  shellfish. (Anaphylaxis)      Past Medical and Surgical History:  Interstitial lung disease: on home o2 prn  NHL (non-Hodgkin's lymphoma): Agem 43 sp chemo/rt/stem cell. Currently in remission (13 years)  Transient cerebral ischemia, unspecified type  Mitral prolapse  History of tonsillectomy  History of appendectomy      Past Psychiatric History:  Denies personal and family history of psychiatric illness.      Substance Use History:  Denies smoking cigarettes drinking alcohol, or using illicit drugs.      Social and Personal History:  Has received high-school degree.  Has a living mother, brother, and sister.  Lives in a house with  and daughter. Familyis involved in and supportive of healthcare.  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      Family History:  Family history of stroke  Family history of breast cancer: Mother      Vitals:  T(C): 36.7 (19 @ 15:45), Max: 37.1 (19 @ 05:15)  HR: 90 (19 @ 15:45) (49 - 90)  BP: 112/67 (19 @ 15:45) (112/67 - 124/81)  RR: 16 (19 @ 15:45) (14 - 16)  SpO2: 99% (19 @ 15:45) (97% - 99%)  Wt(kg): --      Laboratory testin.8   8.2   )-----------( 391      ( 06 May 2019 07:10 )             38.1         140  |  102  |  25<H>  ----------------------------<  78  4.0   |  31  |  0.82    Ca    9.6      06 May 2019 07:10      Psychiatric Diagnosis:  Neurocognitive deficits and delirium due to recent b/l SDHs and cerebral ischemia.  r/o permanent neurocognitive deficits due to multiple cerebral insults.  Expect improvement of neurocognitive function with rehabilitation.    Recommendations:  Discuss all medical decisions with patient prior to implementation as to re-build trust.  Try to keep patient awake during the day with non-pharmacologic intervention: daytime stimulation (ex. turn on overhead lights/television, frequent interaction with family and healthcare staff).  Continue with PT/OT/rehabilitation once day-night reversal is corrected.  Consider pharmacologic treatment if patient changes her mind regarding taking new medications.    Guidance for team/ family management:  Encourage family support.  Will call daughter (Eva) and update her on current situation; patient consents to communication - [phone].    Guidance for patient management: As per medical team.    Hospital course Follow-up: Will follow with you. Source: Self, chart  Reliability: reliable, although unsure of timeline    Identifying Data: 60yo , retired female with HEALTH ISSUES - PROBLEM Dx:  Constipation  Transient cerebral ischemia, unspecified type  Anemia due to blood loss  GI bleed  Hypovolemic shock  Rectal bleed      Chief Complaint: "I don't take that kind of stuff."    History of Present Illness on Admission:  59 yr old female PMHx of MVP, chronic SDH, interstitial lung disease/pneumothorax, pulmonary nodules, meniere's, non hodgkins lymphoma (SCD/XRT/chemo at [facility] [place] ), TIA, tachycardia, occipital neuralgia, and neuropathy.  She was recently admitted here to [place] [facility] acute rehab on 19.  RRT was called early this AM because pt was noted to have 2 bloody BMs on diaper, with blood clots.  Pt denies any abdominal pain, chest pain, but very anxious.  She was tachycardic to 120's, BP 90/60, O2 sat was 98%, and she was afebrile.    She had several recent hospitalizations, and did state that she had one episode on hematochezia during last hospitalization, that resolved by itself.  Had been seen by GI for rectal pain, found to have anal fissure, hemorrhoids, constipation and was placed on a bowel regimen. Had a KUB 19 No stool, no free air, no distended loops of small bowel, fibroids. Refused further workup.  She never had a colonoscopy.    She was initially admitted to [place] on 3/13/19 with acute onset of b/l UE weakness associated with nausea, blurry vision, and HA. CT head showed chronic B/L frontal SDH. The rest of the workup was negative. CVA/TIA r/out.  Pt was discharged home but on 3/17 had apparent seizure.  She was admitted to [facility] and intubated and placed on depakote.  CTA H/N, MRI, EEG unremarkable.  ID Recommended LP for fever; family deferred.  Transferred to [facility] on 3/19/19.  MRI brain showed cerebral ischemia. The rest of the workup was negative, this including CSF studies. EEG neg seizures. Sukhdev had workup for PE and was neg. (Evaluated by lymphoma specialist/onco, PET 19 r/o recurrence of lymphoma. Increased signal at base of tongue to follow up outpt ENT). Additionally, course complicated with pneumothorax, pulmonary consulted, no interventions, self resolved.  Tachycardia - per pulm, related to chronic lung disease.  Had an echo 3/18/19 EF 60%, mod-severe MR, had MRI brain 3/29/19 cortically based restricted diffusion within R>L frontoparietal region as well as additional scattered small foci, may represent evolution of a recent ischemia vs improving encephalitis.   She was stabilized and d/bonnie here for rehab. (2019 05:31)      Psychiatric History of Present Illness:  Patient endorses a depressed mood. She became tearful when talking about her mother, saying that she wishes she could visit her more but that she's been stuck in the hospital. Patient also endorses reversal of day-night cycle, which makes it difficult for her to be seen by PT. She feels fatigued and would prefer to sleep all day, but cannot go to sleep at night. Patient also endorses intermittent anorexia. Sometimes she can eat her food, but other times it makes her nauseous just seeing the food. She endorses difficulty concentrating and left sided-partial paralysis ever since her recent b/l SDHs and cerebral ischemia. Patient endorses anhedonia, and states that she would "get better 15 times faster" if she was resting at home.    Patient also endorses worry/anxiety about her chronic health issues and her recent health issues. Patient states that she doesn't trust medical team because we tried to "sneak her" Lexapro without discussing the medication with her first. Pt states she generally does not like to take medications and that even taking Tylenol is a "big deal" for her.       Psychiatric Review of Systems:  Patient endorsed that she used to have visual hallucinations (multiple televisions in her hospital room) when she was at [facility], but has not had any hallucinations since then.  Patient denies symptoms of eri.      Suicidality:  Has the patient ever thought about suicide? none  Current suicidal thoughts?none    Injury to others:  Does the patient express concern about hurting others? none      Mental Status Exam:  General Appearance: Female patient in hospital gown and glasses lying in her bed; appears older that stated age.  Remarkable Features: none.	  Affect: mostly neutral, depressed at times (talking about mother). Congruent with mood.  Mood: depressed, worried.  Speech: decreased rate, normal tone, volume, and prosody.  Psychomotor state: No psychomotor agitation or retardation. Left-sided motor and sensory deficits s/p b/l SDHs and cerebral ischemia.  Abnormal Movements and posture: Decreased left-sided movements.  Cognition, Perceptual Abnormalities: MOCA = 15/30. Patient gained points in naming, attention (able to read list of digits, one correct instance of serial 3s), language repetition, abstraction, and 4/6 in orientation.  Consciousness: lethargic.  Orientation: oriented to person, month, year, place, and city. Not oriented to date and day of the week.  Memory: Recent/Past/Remote: Able to recall medical facts about her pasts and family details, although she doesn't remember timelines. Only able to remember 2/5 words on MOCA with multiple choice cues.  Attention: partially intact, patient was lethargic and would often close her eyes while being spoken to.  Judgment/Insight: fair.  Fund of Information/Intelligence: high-school education, appropriate vocabulary.    List abnormalities:  Ideas of reference, bizarre ideations, recurrent illusions, phobias, obsessions/compulsions: none elicited.  Hallucinations: Auditory/visual/tactile/olfactory/other: none elicited.        MEDICATIONS  (STANDING):  aspirin enteric coated 81 milliGRAM(s) Oral daily  buDESOnide 160 MICROgram(s)/formoterol 4.5 MICROgram(s) Inhaler 2 Puff(s) Inhalation every 12 hours  chlorhexidine 4% Liquid 1 Application(s) Topical every 12 hours  docusate sodium 100 milliGRAM(s) Oral daily  lidocaine   Patch 1 Patch Transdermal daily  metoprolol tartrate 25 milliGRAM(s) Oral two times a day  multivitamin 1 Tablet(s) Oral daily  pantoprazole  Injectable 40 milliGRAM(s) IV Push every 12 hours  senna 2 Tablet(s) Oral at bedtime  tiotropium 18 MICROgram(s) Capsule 1 Capsule(s) Inhalation daily  valproic acid 500 milliGRAM(s) Oral every 12 hours  zinc oxide 40% Ointment 1 Application(s) Topical daily    MEDICATIONS  (PRN):  acetaminophen   Tablet .. 650 milliGRAM(s) Oral every 6 hours PRN Temp greater or equal to 38C (100.4F), Mild Pain (1 - 3)  ALBUTerol/ipratropium for Nebulization 3 milliLiter(s) Nebulizer every 6 hours PRN Shortness of Breath and/or Wheezing  docusate sodium 100 milliGRAM(s) Oral daily PRN Constipation  guaiFENesin    Syrup 200 milliGRAM(s) Oral every 6 hours PRN Cough  haloperidol    Injectable 2.5 milliGRAM(s) IntraMuscular once PRN agitation  ondansetron Injectable 4 milliGRAM(s) IV Push every 6 hours PRN Nausea and/or Vomiting  polyethylene glycol 3350 17 Gram(s) Oral daily PRN Constipation  sodium chloride 0.9% lock flush 10 milliLiter(s) IV Push every 1 hour PRN Pre/post blood products, medications, blood draw, and to maintain line patency      Allergies:  IV Contrast (Anaphylaxis)  shellfish. (Anaphylaxis)      Past Medical and Surgical History:  Interstitial lung disease: on home o2 prn  NHL (non-Hodgkin's lymphoma): Agem 43 sp chemo/rt/stem cell. Currently in remission (13 years)  Transient cerebral ischemia, unspecified type  Mitral prolapse  History of tonsillectomy  History of appendectomy      Past Psychiatric History:  Denies personal and family history of psychiatric illness.      Substance Use History:  Denies smoking cigarettes drinking alcohol, or using illicit drugs.      Social and Personal History:  Has received high-school degree.  Has a living mother, brother, and sister.  Lives in a house with  and daughter. Family is involved in and supportive of healthcare.  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      Family History:  Family history of stroke  Family history of breast cancer: Mother      Vitals:  T(C): 36.7 (19 @ 15:45), Max: 37.1 (19 @ 05:15)  HR: 90 (19 @ 15:45) (49 - 90)  BP: 112/67 (19 @ 15:45) (112/67 - 124/81)  RR: 16 (19 @ 15:45) (14 - 16)  SpO2: 99% (19 @ 15:45) (97% - 99%)  Wt(kg): --      Laboratory testin.8   8.2   )-----------( 391      ( 06 May 2019 07:10 )             38.1     -    140  |  102  |  25<H>  ----------------------------<  78  4.0   |  31  |  0.82    Ca    9.6      06 May 2019 07:10      Psychiatric Diagnosis:  Neurocognitive deficits and delirium due to recent b/l SDHs and cerebral ischemia.  r/o permanent neurocognitive deficits due to multiple cerebral insults.  Expect improvement of neurocognitive function with rehabilitation.    Recommendations:  Discuss all medical decisions with patient prior to implementation as to re-build trust.  Try to keep patient awake during the day with non-pharmacologic intervention: daytime stimulation (ex. turn on overhead lights/television, frequent interaction with family and healthcare staff).  Continue with PT/OT/rehabilitation once day-night reversal is corrected.  Consider pharmacologic treatment if patient changes her mind regarding taking new medications.    Guidance for team/ family management:  Encourage family support.  Will call daughter (Eva) and update her on current situation; patient consents to communication - [phone].    Guidance for patient management: As per medical team.    Hospital course Follow-up: Will follow with you.

## 2019-05-07 NOTE — PROGRESS NOTE ADULT - ATTENDING COMMENTS
tachycardia  most likely on the basis of medical illness including anemia gi bleeding volume depletion and fibrotic lung disease. would continue to address medical illnesses.
tachycardia  most likely on the basis of multiple medical illnesses including gi bleed and underlying pulmonary disease. no new cardiac recommendations.
I have personally seen and examined patient on the above date.  I discussed the case with Rosa Maria Grant NP and I agree with findings and plan as detailed per note above, which I have amended where appropriate.      stabilized  still awaiting dispo and hopeful for discharge to acute rehab.
I have personally seen and examined patient on the above date.  I discussed the case with Rosa Maria Grant NP and I agree with findings and plan as detailed per note above, which I have amended where appropriate.    Dispo: Discharge to acute rehab pending OT consult
Patient seen and examined with Bettye George NP.  Agree with assessment and plan as above.  Patient continues to have active lower GI bleeding.  Colonoscopy will not be of any benefit in this setting due to inability to visualize mucosa.  She is now on pressor agents.      EGD performed this afternoon DID NOT reveal any source of bleeding in the upper GI tract.      She has agreed to IV contrast late this evening - effort will be made to possibly desensitize in order to have CT angiography to localize the site of bleeding.    CBC Q6 hours; PRBC transfusion as needed  Plan discussed with Dr. Chavez and Dr. Fry earlier today.    Prognosis guarded.
Patient seen and examined.  Agree with assessment and plan as above    Patient appears better today.  No acute complaints.  Colonoscopy yesterday unremarkable except for nonbleeding internal hemorrhoids.  No abdominal pain.  Tolerating po intake.  VSS.  Abdomen soft, nontender    Monitor Hgb/Hct and bowel movements  Video capsule endoscopy as outpatient
Patient seen and examined.  Agree with assessment and plan as above.  No acute complaints.  No abdominal pain.  No reports of melena or BRBPR.  VSS.  Abdomen soft, nontender    Will proceed with colonoscopy tomorrow.  Bowel prep orders written
Patient seen and examined.  Agree with assessment and plan as above.  Some difficulty with bowel prep last evening, but appears to have consumed enough.  No abdominal pain.    Colonoscopy today
Patient seen and examined.  Agree with assessment and plan as above.  Hgb/Hct remains stable.  No overt GI bleeding at this time.  Abdomen soft, nontender    Video capsule endoscopy recommended after discharge,
Patient seen and examined.  No new complaints.  Hgb/Hct has been stable and no evidence of GI bleeding.    Agree with assessment and plan as above.

## 2019-05-08 PROCEDURE — 99233 SBSQ HOSP IP/OBS HIGH 50: CPT

## 2019-05-08 RX ORDER — ALPRAZOLAM 0.25 MG
0.25 TABLET ORAL AT BEDTIME
Qty: 0 | Refills: 0 | Status: DISCONTINUED | OUTPATIENT
Start: 2019-05-08 | End: 2019-05-09

## 2019-05-08 RX ORDER — ALPRAZOLAM 0.25 MG
0.25 TABLET ORAL ONCE
Qty: 0 | Refills: 0 | Status: DISCONTINUED | OUTPATIENT
Start: 2019-05-08 | End: 2019-05-08

## 2019-05-08 RX ORDER — ALPRAZOLAM 0.25 MG
0.25 TABLET ORAL
Qty: 0 | Refills: 0 | Status: DISCONTINUED | OUTPATIENT
Start: 2019-05-08 | End: 2019-05-09

## 2019-05-08 RX ADMIN — PANTOPRAZOLE SODIUM 40 MILLIGRAM(S): 20 TABLET, DELAYED RELEASE ORAL at 05:13

## 2019-05-08 RX ADMIN — ZINC OXIDE 1 APPLICATION(S): 200 OINTMENT TOPICAL at 11:34

## 2019-05-08 RX ADMIN — Medication 650 MILLIGRAM(S): at 17:32

## 2019-05-08 RX ADMIN — Medication 650 MILLIGRAM(S): at 09:31

## 2019-05-08 RX ADMIN — Medication 0.25 MILLIGRAM(S): at 20:56

## 2019-05-08 RX ADMIN — Medication 500 MILLIGRAM(S): at 17:14

## 2019-05-08 RX ADMIN — ONDANSETRON 4 MILLIGRAM(S): 8 TABLET, FILM COATED ORAL at 09:31

## 2019-05-08 RX ADMIN — Medication 650 MILLIGRAM(S): at 23:18

## 2019-05-08 RX ADMIN — Medication 1 TABLET(S): at 11:29

## 2019-05-08 RX ADMIN — Medication 650 MILLIGRAM(S): at 10:00

## 2019-05-08 RX ADMIN — BUDESONIDE AND FORMOTEROL FUMARATE DIHYDRATE 2 PUFF(S): 160; 4.5 AEROSOL RESPIRATORY (INHALATION) at 09:02

## 2019-05-08 RX ADMIN — Medication 0.25 MILLIGRAM(S): at 02:46

## 2019-05-08 RX ADMIN — Medication 500 MILLIGRAM(S): at 05:14

## 2019-05-08 RX ADMIN — Medication 100 MILLIGRAM(S): at 11:29

## 2019-05-08 RX ADMIN — TIOTROPIUM BROMIDE 1 CAPSULE(S): 18 CAPSULE ORAL; RESPIRATORY (INHALATION) at 09:02

## 2019-05-08 RX ADMIN — PANTOPRAZOLE SODIUM 40 MILLIGRAM(S): 20 TABLET, DELAYED RELEASE ORAL at 17:17

## 2019-05-08 RX ADMIN — Medication 650 MILLIGRAM(S): at 17:02

## 2019-05-08 RX ADMIN — Medication 81 MILLIGRAM(S): at 11:28

## 2019-05-08 RX ADMIN — SENNA PLUS 2 TABLET(S): 8.6 TABLET ORAL at 20:56

## 2019-05-08 NOTE — PROGRESS NOTE ADULT - ASSESSMENT
59 yr old female PMHx of MVP, chronic SDH, interstitial lung disease/pneumothorax, pulmonary nodules, meniere's, non hodgkins lymphoma (SCD/XRT/chemo at [facility] 2003), TIA, tachycardia, occipital neuralgia, and neuropathy being admitted to the ICU for acute GI bleeding with hypotension    #Hypovolemic shock 2/2 GIB and acute blood loss anemia  resolved     #Left HCAP vs Aspiration PNA  resolved , off abx     #Acute blood loss anemia/GIB  resolved, h/h stable    #CVA - cont. asa     #Seizures - on valproic acid    #Sinus tachycardia- stable    #Anxiety/depression   psych consult placed     acute rehab discharge

## 2019-05-08 NOTE — PROGRESS NOTE ADULT - SUBJECTIVE AND OBJECTIVE BOX
ROMIE VIRAMONTES  59y  Female    Patient is a 59y old  Female who presents with a chief complaint of Rectal bleed (07 May 2019 16:00)    Pt seen and examined  no events overnight     PAST MEDICAL & SURGICAL HISTORY:  Interstitial lung disease: on home o2 prn  NHL (non-Hodgkin's lymphoma): Agem 43 sp chemo/rt/stem cell  Transient cerebral ischemia, unspecified type  Mitral prolapse  History of tonsillectomy  History of appendectomy        MedsMEDICATIONS  (STANDING):  aspirin enteric coated 81 milliGRAM(s) Oral daily  buDESOnide 160 MICROgram(s)/formoterol 4.5 MICROgram(s) Inhaler 2 Puff(s) Inhalation every 12 hours  chlorhexidine 4% Liquid 1 Application(s) Topical every 12 hours  docusate sodium 100 milliGRAM(s) Oral daily  lidocaine   Patch 1 Patch Transdermal daily  metoprolol tartrate 25 milliGRAM(s) Oral two times a day  multivitamin 1 Tablet(s) Oral daily  pantoprazole  Injectable 40 milliGRAM(s) IV Push every 12 hours  senna 2 Tablet(s) Oral at bedtime  tiotropium 18 MICROgram(s) Capsule 1 Capsule(s) Inhalation daily  valproic acid 500 milliGRAM(s) Oral every 12 hours  zinc oxide 40% Ointment 1 Application(s) Topical daily    MEDICATIONS  (PRN):  acetaminophen   Tablet .. 650 milliGRAM(s) Oral every 6 hours PRN Temp greater or equal to 38C (100.4F), Mild Pain (1 - 3)  ALBUTerol/ipratropium for Nebulization 3 milliLiter(s) Nebulizer every 6 hours PRN Shortness of Breath and/or Wheezing  ALPRAZolam 0.25 milliGRAM(s) Oral at bedtime PRN sleep  ALPRAZolam 0.25 milliGRAM(s) Oral two times a day PRN anxiety  docusate sodium 100 milliGRAM(s) Oral daily PRN Constipation  guaiFENesin    Syrup 200 milliGRAM(s) Oral every 6 hours PRN Cough  haloperidol    Injectable 2.5 milliGRAM(s) IntraMuscular once PRN agitation  ondansetron Injectable 4 milliGRAM(s) IV Push every 6 hours PRN Nausea and/or Vomiting  polyethylene glycol 3350 17 Gram(s) Oral daily PRN Constipation  sodium chloride 0.9% lock flush 10 milliLiter(s) IV Push every 1 hour PRN Pre/post blood products, medications, blood draw, and to maintain line patency      Vital Signs Last 24 Hrs  T(C): 36.6 (08 May 2019 05:19), Max: 36.7 (07 May 2019 15:45)  T(F): 97.8 (08 May 2019 05:19), Max: 98 (07 May 2019 15:45)  HR: 79 (08 May 2019 05:19) (49 - 102)  BP: 111/65 (08 May 2019 05:19) (96/69 - 112/67)  BP(mean): --  RR: 15 (08 May 2019 05:19) (14 - 16)  SpO2: 98% (08 May 2019 05:19) (97% - 99%)    PHYSICAL EXAM:  GENERAL: NAD  HEAD:  NC/AT  EYES: EOMI, PERRLA, conjunctiva and sclera clear  NERVOUS SYSTEM:  Alert & Oriented X3  CHEST/LUNG: Clear lungs b/l  HEART: S1S2, RRR   ABDOMEN: Soft, non-tender, non-distended, + bowel sounds  EXTREMITIES:  2+ Peripheral Pulses, No clubbing, cyanosis, or edema  SKIN: No rashes or lesions    LABS:                                              RADIOLOGY & ADDITIONAL TESTS:    Imaging Personally Reviewed:  [x ] YES  [ ] NO      HEALTH ISSUES - PROBLEM Dx:  Constipation: Constipation  Transient cerebral ischemia, unspecified type: Transient cerebral ischemia, unspecified type  Anemia due to blood loss: Anemia due to blood loss  GI bleed: GI bleed  Hypovolemic shock: Hypovolemic shock  Rectal bleed: Rectal bleed          Care Discussed with Consultants/Other Providers [ x] YES  [ ] NO

## 2019-05-09 ENCOUNTER — INPATIENT (INPATIENT)
Facility: HOSPITAL | Age: 59
LOS: 27 days | Discharge: HOME HEALTH | DRG: 949 | End: 2019-06-06
Attending: PSYCHIATRY & NEUROLOGY | Admitting: PSYCHIATRY & NEUROLOGY
Payer: MEDICARE

## 2019-05-09 VITALS
RESPIRATION RATE: 14 BRPM | WEIGHT: 114.42 LBS | OXYGEN SATURATION: 97 % | HEIGHT: 69 IN | SYSTOLIC BLOOD PRESSURE: 100 MMHG | TEMPERATURE: 98 F | HEART RATE: 103 BPM | DIASTOLIC BLOOD PRESSURE: 67 MMHG

## 2019-05-09 VITALS
HEART RATE: 101 BPM | DIASTOLIC BLOOD PRESSURE: 66 MMHG | OXYGEN SATURATION: 98 % | SYSTOLIC BLOOD PRESSURE: 99 MMHG | RESPIRATION RATE: 14 BRPM | TEMPERATURE: 98 F

## 2019-05-09 DIAGNOSIS — Z90.49 ACQUIRED ABSENCE OF OTHER SPECIFIED PARTS OF DIGESTIVE TRACT: Chronic | ICD-10-CM

## 2019-05-09 DIAGNOSIS — Z90.89 ACQUIRED ABSENCE OF OTHER ORGANS: Chronic | ICD-10-CM

## 2019-05-09 DIAGNOSIS — R53.81 OTHER MALAISE: ICD-10-CM

## 2019-05-09 PROCEDURE — 87040 BLOOD CULTURE FOR BACTERIA: CPT

## 2019-05-09 PROCEDURE — 86923 COMPATIBILITY TEST ELECTRIC: CPT

## 2019-05-09 PROCEDURE — 93005 ELECTROCARDIOGRAM TRACING: CPT

## 2019-05-09 PROCEDURE — 85730 THROMBOPLASTIN TIME PARTIAL: CPT

## 2019-05-09 PROCEDURE — 93970 EXTREMITY STUDY: CPT

## 2019-05-09 PROCEDURE — 80053 COMPREHEN METABOLIC PANEL: CPT

## 2019-05-09 PROCEDURE — 99233 SBSQ HOSP IP/OBS HIGH 50: CPT

## 2019-05-09 PROCEDURE — 84145 PROCALCITONIN (PCT): CPT

## 2019-05-09 PROCEDURE — 85610 PROTHROMBIN TIME: CPT

## 2019-05-09 PROCEDURE — 86900 BLOOD TYPING SEROLOGIC ABO: CPT

## 2019-05-09 PROCEDURE — 97140 MANUAL THERAPY 1/> REGIONS: CPT

## 2019-05-09 PROCEDURE — 86901 BLOOD TYPING SEROLOGIC RH(D): CPT

## 2019-05-09 PROCEDURE — 84100 ASSAY OF PHOSPHORUS: CPT

## 2019-05-09 PROCEDURE — 97110 THERAPEUTIC EXERCISES: CPT

## 2019-05-09 PROCEDURE — 85027 COMPLETE CBC AUTOMATED: CPT

## 2019-05-09 PROCEDURE — 82803 BLOOD GASES ANY COMBINATION: CPT

## 2019-05-09 PROCEDURE — 94667 MNPJ CHEST WALL 1ST: CPT

## 2019-05-09 PROCEDURE — 94668 MNPJ CHEST WALL SBSQ: CPT

## 2019-05-09 PROCEDURE — 94640 AIRWAY INHALATION TREATMENT: CPT

## 2019-05-09 PROCEDURE — 83951 ONCOPROTEIN DCP: CPT

## 2019-05-09 PROCEDURE — P9047: CPT

## 2019-05-09 PROCEDURE — 80048 BASIC METABOLIC PNL TOTAL CA: CPT

## 2019-05-09 PROCEDURE — P9037: CPT

## 2019-05-09 PROCEDURE — 74176 CT ABD & PELVIS W/O CONTRAST: CPT

## 2019-05-09 PROCEDURE — 84443 ASSAY THYROID STIM HORMONE: CPT

## 2019-05-09 PROCEDURE — 92526 ORAL FUNCTION THERAPY: CPT

## 2019-05-09 PROCEDURE — 86850 RBC ANTIBODY SCREEN: CPT

## 2019-05-09 PROCEDURE — P9059: CPT

## 2019-05-09 PROCEDURE — 85014 HEMATOCRIT: CPT

## 2019-05-09 PROCEDURE — 36430 TRANSFUSION BLD/BLD COMPNT: CPT

## 2019-05-09 PROCEDURE — 71045 X-RAY EXAM CHEST 1 VIEW: CPT

## 2019-05-09 PROCEDURE — 92610 EVALUATE SWALLOWING FUNCTION: CPT

## 2019-05-09 PROCEDURE — 83605 ASSAY OF LACTIC ACID: CPT

## 2019-05-09 PROCEDURE — 97163 PT EVAL HIGH COMPLEX 45 MIN: CPT

## 2019-05-09 PROCEDURE — 85018 HEMOGLOBIN: CPT

## 2019-05-09 PROCEDURE — 97166 OT EVAL MOD COMPLEX 45 MIN: CPT

## 2019-05-09 PROCEDURE — 99223 1ST HOSP IP/OBS HIGH 75: CPT

## 2019-05-09 PROCEDURE — 83735 ASSAY OF MAGNESIUM: CPT

## 2019-05-09 PROCEDURE — P9016: CPT

## 2019-05-09 PROCEDURE — 82533 TOTAL CORTISOL: CPT

## 2019-05-09 PROCEDURE — 36415 COLL VENOUS BLD VENIPUNCTURE: CPT

## 2019-05-09 PROCEDURE — 97116 GAIT TRAINING THERAPY: CPT

## 2019-05-09 PROCEDURE — 93306 TTE W/DOPPLER COMPLETE: CPT

## 2019-05-09 RX ORDER — LIDOCAINE 4 G/100G
1 CREAM TOPICAL
Refills: 0 | Status: DISCONTINUED | OUTPATIENT
Start: 2019-05-09 | End: 2019-06-06

## 2019-05-09 RX ORDER — TRAZODONE HCL 50 MG
50 TABLET ORAL AT BEDTIME
Refills: 0 | Status: DISCONTINUED | OUTPATIENT
Start: 2019-05-09 | End: 2019-05-09

## 2019-05-09 RX ORDER — ZINC OXIDE 200 MG/G
1 OINTMENT TOPICAL DAILY
Refills: 0 | Status: DISCONTINUED | OUTPATIENT
Start: 2019-05-09 | End: 2019-06-06

## 2019-05-09 RX ORDER — LANOLIN ALCOHOL/MO/W.PET/CERES
3 CREAM (GRAM) TOPICAL AT BEDTIME
Refills: 0 | Status: DISCONTINUED | OUTPATIENT
Start: 2019-05-09 | End: 2019-05-09

## 2019-05-09 RX ORDER — TIOTROPIUM BROMIDE 18 UG/1
1 CAPSULE ORAL; RESPIRATORY (INHALATION) DAILY
Refills: 0 | Status: DISCONTINUED | OUTPATIENT
Start: 2019-05-09 | End: 2019-06-06

## 2019-05-09 RX ORDER — BUDESONIDE, MICRONIZED 100 %
0.25 POWDER (GRAM) MISCELLANEOUS DAILY
Refills: 0 | Status: DISCONTINUED | OUTPATIENT
Start: 2019-05-09 | End: 2019-05-10

## 2019-05-09 RX ORDER — VALPROIC ACID (AS SODIUM SALT) 250 MG/5ML
250 SOLUTION, ORAL ORAL EVERY 12 HOURS
Refills: 0 | Status: DISCONTINUED | OUTPATIENT
Start: 2019-05-09 | End: 2019-06-04

## 2019-05-09 RX ORDER — DOCUSATE SODIUM 100 MG
100 CAPSULE ORAL DAILY
Refills: 0 | Status: DISCONTINUED | OUTPATIENT
Start: 2019-05-09 | End: 2019-05-28

## 2019-05-09 RX ORDER — ACETAMINOPHEN 500 MG
650 TABLET ORAL EVERY 6 HOURS
Refills: 0 | Status: DISCONTINUED | OUTPATIENT
Start: 2019-05-09 | End: 2019-06-06

## 2019-05-09 RX ORDER — METOPROLOL TARTRATE 50 MG
25 TABLET ORAL
Refills: 0 | Status: DISCONTINUED | OUTPATIENT
Start: 2019-05-09 | End: 2019-05-10

## 2019-05-09 RX ORDER — ALPRAZOLAM 0.25 MG
0.25 TABLET ORAL THREE TIMES A DAY
Refills: 0 | Status: DISCONTINUED | OUTPATIENT
Start: 2019-05-09 | End: 2019-05-14

## 2019-05-09 RX ORDER — PANTOPRAZOLE SODIUM 20 MG/1
40 TABLET, DELAYED RELEASE ORAL
Refills: 0 | Status: DISCONTINUED | OUTPATIENT
Start: 2019-05-09 | End: 2019-06-06

## 2019-05-09 RX ORDER — PREGABALIN 225 MG/1
1000 CAPSULE ORAL DAILY
Refills: 0 | Status: DISCONTINUED | OUTPATIENT
Start: 2019-05-09 | End: 2019-06-06

## 2019-05-09 RX ORDER — LANOLIN ALCOHOL/MO/W.PET/CERES
3 CREAM (GRAM) TOPICAL AT BEDTIME
Refills: 0 | Status: DISCONTINUED | OUTPATIENT
Start: 2019-05-09 | End: 2019-05-15

## 2019-05-09 RX ORDER — ASPIRIN/CALCIUM CARB/MAGNESIUM 324 MG
81 TABLET ORAL DAILY
Refills: 0 | Status: DISCONTINUED | OUTPATIENT
Start: 2019-05-09 | End: 2019-06-06

## 2019-05-09 RX ADMIN — Medication 650 MILLIGRAM(S): at 08:35

## 2019-05-09 RX ADMIN — Medication 250 MILLIGRAM(S): at 18:57

## 2019-05-09 RX ADMIN — Medication 1 TABLET(S): at 11:27

## 2019-05-09 RX ADMIN — Medication 650 MILLIGRAM(S): at 15:19

## 2019-05-09 RX ADMIN — Medication 100 MILLIGRAM(S): at 11:27

## 2019-05-09 RX ADMIN — Medication 650 MILLIGRAM(S): at 07:56

## 2019-05-09 RX ADMIN — ONDANSETRON 4 MILLIGRAM(S): 8 TABLET, FILM COATED ORAL at 09:50

## 2019-05-09 RX ADMIN — Medication 500 MILLIGRAM(S): at 05:54

## 2019-05-09 RX ADMIN — ZINC OXIDE 1 APPLICATION(S): 200 OINTMENT TOPICAL at 11:27

## 2019-05-09 RX ADMIN — Medication 81 MILLIGRAM(S): at 11:27

## 2019-05-09 RX ADMIN — PANTOPRAZOLE SODIUM 40 MILLIGRAM(S): 20 TABLET, DELAYED RELEASE ORAL at 05:53

## 2019-05-09 RX ADMIN — Medication 0.25 MILLIGRAM(S): at 04:05

## 2019-05-09 NOTE — PROGRESS NOTE ADULT - REASON FOR ADMISSION
Rectal bleed
functional deficits
Rectal bleed

## 2019-05-09 NOTE — H&P ADULT - NSHPPHYSICALEXAM_GEN_ALL_CORE
General-fragile  Neck - Supple, No limited ROM  Chest - Decreased breath sounds.  Cardiovascular - tachycardia, No murmurs  Abdomen - BS+, Soft, NTND  Extremities - No C/C/E, No calf tenderness   Skin-no rash      Neurologic Exam -  AAO to person, place and time, VFF, no dysphagia, dysarthria, spastic quadriparesis, more pronounced in left UE 1-2/5, right UE 4/5 and lower extremity 4/5 with poor effort.  Impaired LT sensation in left UEs and LEs. DTRs symmetric. Toes mute. FTN coordination intact on the right; unable on the left due to severe weakness.                Balance - untested.      Gait- deferred/unable      Psychiatric - Emotional, depressed, anxious. General-fragile  Neck - Supple, No limited ROM  Chest - Decreased breath sounds.  Cardiovascular - tachycardia, No murmurs  Abdomen - BS+, Soft, NTND  Extremities - No C/C/E, No calf tenderness   Skin-no rash      Neurologic Exam -  AAO to person, place and time, VFF, no dysphagia, dysarthria, spastic quadriparesis, more pronounced in left UE 1-2/5, right UE 4/5 and lower extremity 4/5 with poor effort. Impaired LT sensation in left UEs and LEs. DTRs symmetric. Toes mute. FTN coordination intact on the right; impaired on the left due to severe weakness.                Balance - impaired     Gait- deferred/unable      Psychiatric - Emotional, depressed, anxious

## 2019-05-09 NOTE — PROGRESS NOTE ADULT - ASSESSMENT
59 yr old female PMHx of MVP, chronic SDH, interstitial lung disease/pneumothorax, pulmonary nodules, meniere's, non hodgkins lymphoma (SCD/XRT/chemo at [facility] 2003), TIA, tachycardia, occipital neuralgia, and neuropathy being admitted to the ICU for acute GI bleeding with hypotension    #Hypovolemic shock 2/2 GIB and acute blood loss anemia  resolved   H/h stable  no further episodes of blood per rectum.    #Left HCAP vs Aspiration PNA  resolved , off abx     #Acute blood loss anemia/GIB  resolved, h/h stable    #CVA - cont. asa     #Seizures - on valproic acid    #Sinus tachycardia- stable    #Anxiety/depression   psych consult placed     acute rehab discharge, awaiting authorisation

## 2019-05-09 NOTE — H&P ADULT - ASSESSMENT
60 yo female s/p suspected CVA vs. encephalitis admitted to rehab with left-sided weakness and functional deficits.       #r/o CVA  ASA continues c GI ppx    #tachycardia  -metoprolol, monitor closely    #ILD  -pulmicort, xopenex, spiriva  -on home O2 PRN.     #Seizure  Continue Depakote, seizure ppx      Precautions:           fall                                                                       Diet: regular    DVT Prophylaxis:      TEDs                                                                 Medical Prognosis: guarded    Prescreen Comparison: I have reviewed the prescreen information and I found no relevant changes between the preadmission  screening and my post admission evaluation.     Expected Therapy:   P.T.   1     hrs/day           O. T.   1   hrs/day           S.L.P.  1    hrs/day                    P&O  eval                                                 Excpected Frequency: 5 days/7 day period    Rehab Potential:               fair/poor                            Estimated Disposition:        [facility]:   14           days      Rationale For Inpatient Rehab Admission- Patient demonstrates the following:     [X] Medically appropriate for rehabilitation admission   [X] Has attainable rehab goals with an approrpriate discharge plan  [X] Has rehabilitation potential (expected to make significant improvement within a reasonable period of time)  [X] Requires close medical management by a rehab physician, rehab nursing care and comprehensive interdisciplinary team (including         PT, OT, SLP and/or prosthetics and orthotics) 60 yo female s/p suspected CVA vs. encephalitis admitted to rehab with left-sided weakness and functional deficits.       #r/o CVA  ASA continues c GI ppx    #tachycardia  -metoprolol BID, monitor closely    #ILD  -pulmicort, xopenex, spiriva  -on home O2 PRN.     #Seizure  Continue Depakote, seizure ppx    #rectal pain  GI evaluation of nausea and rectal pain, bowel regimen for constipation    #back pain  Tylenol and lidocaine patch.       Precautions:           fall                                                                       Diet: regular    DVT Prophylaxis:      TEDs                                                                 Medical Prognosis: guarded    Prescreen Comparison: I have reviewed the prescreen information and I found no relevant changes between the preadmission  screening and my post admission evaluation.     Expected Therapy:   P.T.   1     hrs/day           O. T.   1   hrs/day           S.L.P.  1    hrs/day                    P&O  eval                                                 Excpected Frequency: 5 days/7 day period    Rehab Potential:               fair/poor                            [facility]      Rationale For Inpatient Rehab Admission- Patient demonstrates the following:     [X] Medically appropriate for rehabilitation admission   [X] Has attainable rehab goals with an appropriate discharge plan  [X] Has rehabilitation potential (expected to make significant improvement within a reasonable period of time)  [X] Requires close medical management by a rehab physician, rehab nursing care and comprehensive interdisciplinary team (including         PT, OT, SLP and/or prosthetics and orthotics)

## 2019-05-09 NOTE — H&P ADULT - HISTORY OF PRESENT ILLNESS
58 yo female with PMHx of MVP, chronic SDH, interstitial lung disease/pneumothorax, pulmonary nodules, meniere's, non hodgkins lymphoma (SCD/XRT/chemo at [facility] 2003), TIA, tachycardia, occipital neuralgia, and neuropathy.  Patient was initially admitted to [place] on 3/13/19 with acute onset of b/l UE weakness associated with nausea, blurry vision, and HA. CT head showed chronic B/L frontal SDH. The rest of the workup was negative. CVA/TIA r/out.  Pt was discharged home but on 3/17 had apparent seizure.  She was admitted to [facility] and intubated and placed on depakote.  CTA H/N, MRI, EEG unremarkable.  ID Recommended LP for fever; family deferred.  Transferred to [facility] on 3/19/19.  MRI brain showed cerebral ischemia. The rest of the workup was negative, this including CSF studies. EEG neg seizures. (Evaluated by lymphoma specialist/onco, PET 4/5/19 r/o recurrence of lymphoma. Increased signal at base of tongue to follow up outpt ENT). Additionally, course complicated with pneumothorax, pulmonary consulted, no interventions, self resolved.  Tachycardia - per pulm, related related to chronic lung disease.  GI consulted for rectal pain; diagnosed c anal fissure/constipation, bowel regimen started. KUB 4/12/19 No stool, no free air, no distended loops of small bowel, fibroids. Refused further workup.   Other issues while admitted:  Hypotension - home diltiazem held per cardio  dizziness - sporadic, self resolving.  Pt deferred MRI.  May be 2/2 to hx of Meniere's disease.      *TTE 3/18/19 EF 60%, mod-severe MR.   MRI brain 3/29/19 cortically based restricted diffusion within R>L frontoparietal region as well as additional scattered small foci, may represent evolution of a recent ischemia vs improving encephalitis. Patient medically optimized and cleared for discharge to acute rehab at [facility] on 4/19/19.   At [facility] patient presented with rectal pain, fluctuating tachycardia 110-150 at rest and orthostatic hypotension. GI, hematology, cardiology and pulmonary evaluation requested. Rectal bleeding developed in the morning of 4/21/19 with severe hypotension and tachycardia. RRT called. Patient transferred to ICU. Patient's course in ICU reviewed and discussed with staff. Investigations, current lab results and recent treatments also reviewed and discussed. Patient was attempted to be evaluated by PT twice but unable to tolerate evaluation due to severe tachycardia up to 150 at rest and symptomatic orthostatic hypotension as low as 80s. Patient is preparing for colonoscopy today to evaluate the source of rectal bleed. S/p multiple PRBC transfusions. Presently on IV antibiotics and IV steroids.

## 2019-05-09 NOTE — H&P ADULT - NSHPLABSRESULTS_GEN_ALL_CORE
EXAM:  CT BRAIN                            PROCEDURE DATE:  03/12/2019          INTERPRETATION:  NONCONTRAST CT OF THE BRAIN DATED 3/12/2019.    CLINICAL HISTORY: Headache and bilateral arm weakness tonight..    TECHNIQUE: Axial CT images are obtained from the cranial vertex to the   skull base without the administration of IV contrast. Images are   reformatted in sagittal and coronal planes.    The study is correlated with a CT of the head from 11/21/2018 and MRI of   the brain from 11/21/2018.    FINDINGS:    Thin old bilateral subdural hematomas have decreased in size compared to   the prior exam from 11/21/2018, currently measuring up to 5 mm in   thickness over the left frontal convexity and 5 mm over the right frontal   convexity. There is no acute intra-axial or extra-axial hemorrhage. There   is no mass effect or shift of the midline. The basal cisterns are not   effaced. There is mild cerebral and cerebellar volume loss. The   ventricles are not dilated mild chronic ischemic changes are seen in the   frontoparietal white matter. There is no CT evidence of an acute vascular   territorial infarct.    The regional soft tissues and osseous structures are unremarkable. The   visualized paranasal sinuses and tympanic/mastoid cavities are clear   apart from minimal bilateral ethmoid mucosal thickening.    IMPRESSION:    Thin old bilateral subdural hematomas minimally decreased in size   compared to prior exam from 11/21/2018, currently measuring up to 5 mm in   thickness over the left frontal     Mild chronic ischemic changes in the frontoparietal white matter.                LO PARKER D.O.; ATTENDING RADIOLOGIST  This document has been electronically signed. Mar 12 2019 11:43PM

## 2019-05-09 NOTE — PROGRESS NOTE ADULT - PROVIDER SPECIALTY LIST ADULT
Cardiology
Critical Care
Gastroenterology
Hospitalist
Infectious Disease
Neurology
Pulmonology
Rehab Medicine
Surgery
Surgery
Rehab Medicine
Gastroenterology
Gastroenterology
Critical Care

## 2019-05-09 NOTE — PROGRESS NOTE ADULT - SUBJECTIVE AND OBJECTIVE BOX
Patient is a 59y old  Female who presents with a chief complaint of Rectal bleed (08 May 2019 07:18)      Patient seen and examined at bedside.     ALLERGIES:  IV Contrast (Anaphylaxis)  shellfish. (Anaphylaxis)    MEDICATIONS:  acetaminophen   Tablet .. 650 milliGRAM(s) Oral every 6 hours PRN  ALBUTerol/ipratropium for Nebulization 3 milliLiter(s) Nebulizer every 6 hours PRN  ALPRAZolam 0.25 milliGRAM(s) Oral at bedtime PRN  ALPRAZolam 0.25 milliGRAM(s) Oral two times a day PRN  aspirin enteric coated 81 milliGRAM(s) Oral daily  buDESOnide 160 MICROgram(s)/formoterol 4.5 MICROgram(s) Inhaler 2 Puff(s) Inhalation every 12 hours  chlorhexidine 4% Liquid 1 Application(s) Topical every 12 hours  docusate sodium 100 milliGRAM(s) Oral daily PRN  docusate sodium 100 milliGRAM(s) Oral daily  guaiFENesin    Syrup 200 milliGRAM(s) Oral every 6 hours PRN  haloperidol    Injectable 2.5 milliGRAM(s) IntraMuscular once PRN  lidocaine   Patch 1 Patch Transdermal daily  melatonin 3 milliGRAM(s) Oral at bedtime PRN  metoprolol tartrate 25 milliGRAM(s) Oral two times a day  multivitamin 1 Tablet(s) Oral daily  ondansetron Injectable 4 milliGRAM(s) IV Push every 6 hours PRN  pantoprazole  Injectable 40 milliGRAM(s) IV Push every 12 hours  polyethylene glycol 3350 17 Gram(s) Oral daily PRN  senna 2 Tablet(s) Oral at bedtime  sodium chloride 0.9% lock flush 10 milliLiter(s) IV Push every 1 hour PRN  tiotropium 18 MICROgram(s) Capsule 1 Capsule(s) Inhalation daily  traZODone 50 milliGRAM(s) Oral at bedtime PRN  valproic acid 500 milliGRAM(s) Oral every 12 hours  zinc oxide 40% Ointment 1 Application(s) Topical daily    Vital Signs Last 24 Hrs  T(F): 97.7 (09 May 2019 05:22), Max: 98.2 (08 May 2019 20:52)  HR: 99 (09 May 2019 10:14) (95 - 102)  BP: 97/73 (09 May 2019 10:14) (97/73 - 119/85)  RR: 15 (09 May 2019 05:22) (14 - 15)  SpO2: 95% (09 May 2019 10:14) (95% - 100%)  I&O's Summary    08 May 2019 07:01  -  09 May 2019 07:00  --------------------------------------------------------  IN: 1500 mL / OUT: 5 mL / NET: 1495 mL    09 May 2019 07:01  -  09 May 2019 11:53  --------------------------------------------------------  IN: 480 mL / OUT: 0 mL / NET: 480 mL        PHYSICAL EXAM:  General: NAD, comfortable   ENT: MMM  Neck: Supple, No JVD  Lungs: CTA, BLAE, No added sounds.   Cardio: RRR, S1/S2, No murmurs  Abdomen: Soft, NT/ND, BS+   Extremities: No edema  CNS: no new deficits     LABS:                    03-13 Chol 193 mg/dL  mg/dL HDL 62 mg/dL Trig 112 mg/dL        CAPILLARY BLOOD GLUCOSE                RADIOLOGY & ADDITIONAL TESTS:    Care Discussed with Consultants/Other Providers:

## 2019-05-09 NOTE — H&P ADULT - NSHPREVIEWOFSYSTEMS_GEN_ALL_CORE
REVIEW OF SYSTEMS:  CONSTITUTIONAL: No fever, weight loss, or fatigue  EYES: No eye pain, visual disturbances, or discharge  ENMT:  No difficulty hearing, tinnitus, vertigo; No sinus or throat pain  NECK: No pain or stiffness  BREASTS: No pain, masses, or nipple discharge  RESPIRATORY: No cough, wheezing, chills or hemoptysis; No shortness of breath  CARDIOVASCULAR: No chest pain, palpitations, dizziness, or leg swelling  GASTROINTESTINAL: No abdominal or epigastric pain. No nausea, vomiting, or hematemesis; No diarrhea or constipation. No melena or hematochezia.  GENITOURINARY: No dysuria, frequency, hematuria, or incontinence  NEUROLOGICAL: No headaches, memory loss, loss of strength, numbness, or tremors  SKIN: No itching, burning, rashes, or lesions   LYMPH NODES: No enlarged glands  ENDOCRINE: No heat or cold intolerance; No hair loss  MUSCULOSKELETAL: No joint pain or swelling; No muscle, back, or extremity pain  PSYCHIATRIC: No depression, anxiety, mood swings, or difficulty sleeping  HEME/LYMPH: No easy bruising, or bleeding gums  ALLERY AND IMMUNOLOGIC: No hives or eczema CONSTITUTIONAL: No fever, weight loss, + +fatigue  EYES: No eye pain, visual disturbances, or discharge  ENMT:  No difficulty hearing, tinnitus, vertigo; No sinus or throat pain  NECK: No pain or stiffness  BREASTS: No pain, masses, or nipple discharge  RESPIRATORY: No cough, wheezing, chills or hemoptysis; + shortness of breath  CARDIOVASCULAR: No chest pain, +palpitations, dizziness, or leg swelling  GASTROINTESTINAL: No abdominal or epigastric pain. + nausea, no vomiting, +constipation. +rectal pain.   GENITOURINARY: No dysuria, frequency, hematuria, or incontinence  NEUROLOGICAL: No headaches, memory loss, ++loss of strength, +numbness, no tremors  SKIN: No itching, burning, rashes, or lesions   ENDOCRINE: No heat or cold intolerance; No hair loss  MUSCULOSKELETAL: No joint pain or swelling in extremities; ++ back pain  PSYCHIATRIC: +depression, +anxiety, +difficulty sleeping  HEME/LYMPH: No easy bruising, or bleeding gums  ALLERY AND IMMUNOLOGIC: No hives or eczema

## 2019-05-09 NOTE — H&P ADULT - ATTENDING COMMENTS
58 yo RHWF female with complex medical history  s/p suspected multiple  CVAs vs. encephalitis   being readmitted to acute rehabilitation  unit  with spastic quadriparesis, more pronounced in left UE, sensory impairment, gait impairment   and functional deficits.    Patient examined and chart reviewed, case discussed with hospitalists and GI.  Admit to BIU  Resume all medications  Admit labs  Fall, aspiration and SZ precautions  Medicine, GI evaluation  Case discussed with rehabilitation unit staff 60 yo RHWF female with complex medical history  s/p suspected multiple  CVAs vs. encephalitis   being readmitted to acute rehabilitation  unit  with spastic quadriparesis, more pronounced in left UE, sensory impairment, gait impairment   and functional deficits.    Patient examined and chart reviewed, case discussed with hospitalists and GI.  Admit to BIU, patient is very fragile, significantly deconditioned. Therapy notes on acute service reviewed. Will offer an acute rehab program trial, consider [facility] if can't tolerate.  Resume all medications  Admit labs  Fall, aspiration and SZ precautions  Medicine, GI evaluation  Case discussed with rehabilitation unit staff

## 2019-05-10 LAB
ALBUMIN SERPL ELPH-MCNC: 2.6 G/DL — LOW (ref 3.3–5)
ALP SERPL-CCNC: 85 U/L — SIGNIFICANT CHANGE UP (ref 40–120)
ALT FLD-CCNC: 14 U/L DA — SIGNIFICANT CHANGE UP (ref 10–45)
ANION GAP SERPL CALC-SCNC: 6 MMOL/L — SIGNIFICANT CHANGE UP (ref 5–17)
APPEARANCE UR: CLEAR — SIGNIFICANT CHANGE UP
AST SERPL-CCNC: 14 U/L — SIGNIFICANT CHANGE UP (ref 10–40)
BILIRUB SERPL-MCNC: 0.3 MG/DL — SIGNIFICANT CHANGE UP (ref 0.2–1.2)
BILIRUB UR-MCNC: NEGATIVE — SIGNIFICANT CHANGE UP
BUN SERPL-MCNC: 22 MG/DL — SIGNIFICANT CHANGE UP (ref 7–23)
CALCIUM SERPL-MCNC: 9.8 MG/DL — SIGNIFICANT CHANGE UP (ref 8.4–10.5)
CHLORIDE SERPL-SCNC: 101 MMOL/L — SIGNIFICANT CHANGE UP (ref 96–108)
CO2 SERPL-SCNC: 30 MMOL/L — SIGNIFICANT CHANGE UP (ref 22–31)
COLOR SPEC: YELLOW — SIGNIFICANT CHANGE UP
CREAT SERPL-MCNC: 0.77 MG/DL — SIGNIFICANT CHANGE UP (ref 0.5–1.3)
DIFF PNL FLD: ABNORMAL
EOSINOPHIL NFR BLD AUTO: 1 % — SIGNIFICANT CHANGE UP (ref 0–6)
GLUCOSE SERPL-MCNC: 87 MG/DL — SIGNIFICANT CHANGE UP (ref 70–99)
GLUCOSE UR QL: NEGATIVE — SIGNIFICANT CHANGE UP
HCT VFR BLD CALC: 36.6 % — SIGNIFICANT CHANGE UP (ref 34.5–45)
HCT VFR BLD CALC: 38.4 % — SIGNIFICANT CHANGE UP (ref 34.5–45)
HGB BLD-MCNC: 11.7 G/DL — SIGNIFICANT CHANGE UP (ref 11.5–15.5)
HGB BLD-MCNC: 12.2 G/DL — SIGNIFICANT CHANGE UP (ref 11.5–15.5)
KETONES UR-MCNC: NEGATIVE — SIGNIFICANT CHANGE UP
LDH SERPL L TO P-CCNC: 387 U/L — HIGH (ref 50–242)
LEUKOCYTE ESTERASE UR-ACNC: ABNORMAL
LYMPHOCYTES # BLD AUTO: 9 % — LOW (ref 13–44)
MCHC RBC-ENTMCNC: 30 PG — SIGNIFICANT CHANGE UP (ref 27–34)
MCHC RBC-ENTMCNC: 30.1 PG — SIGNIFICANT CHANGE UP (ref 27–34)
MCHC RBC-ENTMCNC: 31.7 GM/DL — LOW (ref 32–36)
MCHC RBC-ENTMCNC: 32 GM/DL — SIGNIFICANT CHANGE UP (ref 32–36)
MCV RBC AUTO: 94.3 FL — SIGNIFICANT CHANGE UP (ref 80–100)
MCV RBC AUTO: 94.6 FL — SIGNIFICANT CHANGE UP (ref 80–100)
MONOCYTES NFR BLD AUTO: 8 % — SIGNIFICANT CHANGE UP (ref 2–14)
NEUTROPHILS NFR BLD AUTO: 78 % — HIGH (ref 43–77)
NITRITE UR-MCNC: NEGATIVE — SIGNIFICANT CHANGE UP
PH UR: 6 — SIGNIFICANT CHANGE UP (ref 5–8)
PLATELET # BLD AUTO: 439 K/UL — HIGH (ref 150–400)
PLATELET # BLD AUTO: 451 K/UL — HIGH (ref 150–400)
POTASSIUM SERPL-MCNC: 4.1 MMOL/L — SIGNIFICANT CHANGE UP (ref 3.5–5.3)
POTASSIUM SERPL-SCNC: 4.1 MMOL/L — SIGNIFICANT CHANGE UP (ref 3.5–5.3)
PROCALCITONIN SERPL-MCNC: 0.09 NG/ML — HIGH
PROT SERPL-MCNC: 6.4 G/DL — SIGNIFICANT CHANGE UP (ref 6–8.3)
PROT UR-MCNC: 30 MG/DL
RBC # BLD: 3.88 M/UL — SIGNIFICANT CHANGE UP (ref 3.8–5.2)
RBC # BLD: 4.06 M/UL — SIGNIFICANT CHANGE UP (ref 3.8–5.2)
RBC # FLD: 14.3 % — SIGNIFICANT CHANGE UP (ref 10.3–14.5)
RBC # FLD: 14.3 % — SIGNIFICANT CHANGE UP (ref 10.3–14.5)
SODIUM SERPL-SCNC: 137 MMOL/L — SIGNIFICANT CHANGE UP (ref 135–145)
SP GR SPEC: 1.02 — SIGNIFICANT CHANGE UP (ref 1.01–1.02)
UROBILINOGEN FLD QL: NEGATIVE — SIGNIFICANT CHANGE UP
VALPROATE SERPL-MCNC: 38 UG/ML — LOW (ref 50–100)
WBC # BLD: 18 K/UL — HIGH (ref 3.8–10.5)
WBC # BLD: 19.4 K/UL — HIGH (ref 3.8–10.5)
WBC # FLD AUTO: 18 K/UL — HIGH (ref 3.8–10.5)
WBC # FLD AUTO: 19.4 K/UL — HIGH (ref 3.8–10.5)

## 2019-05-10 PROCEDURE — 99233 SBSQ HOSP IP/OBS HIGH 50: CPT

## 2019-05-10 PROCEDURE — 99223 1ST HOSP IP/OBS HIGH 75: CPT

## 2019-05-10 PROCEDURE — 93010 ELECTROCARDIOGRAM REPORT: CPT

## 2019-05-10 RX ORDER — METOPROLOL TARTRATE 50 MG
25 TABLET ORAL
Refills: 0 | Status: DISCONTINUED | OUTPATIENT
Start: 2019-05-10 | End: 2019-05-30

## 2019-05-10 RX ORDER — ENOXAPARIN SODIUM 100 MG/ML
40 INJECTION SUBCUTANEOUS DAILY
Refills: 0 | Status: DISCONTINUED | OUTPATIENT
Start: 2019-05-10 | End: 2019-06-06

## 2019-05-10 RX ORDER — BUDESONIDE AND FORMOTEROL FUMARATE DIHYDRATE 160; 4.5 UG/1; UG/1
2 AEROSOL RESPIRATORY (INHALATION)
Refills: 0 | Status: DISCONTINUED | OUTPATIENT
Start: 2019-05-10 | End: 2019-06-06

## 2019-05-10 RX ADMIN — Medication 650 MILLIGRAM(S): at 18:25

## 2019-05-10 RX ADMIN — Medication 650 MILLIGRAM(S): at 01:10

## 2019-05-10 RX ADMIN — ENOXAPARIN SODIUM 40 MILLIGRAM(S): 100 INJECTION SUBCUTANEOUS at 11:48

## 2019-05-10 RX ADMIN — Medication 650 MILLIGRAM(S): at 19:25

## 2019-05-10 RX ADMIN — Medication 250 MILLIGRAM(S): at 17:26

## 2019-05-10 RX ADMIN — Medication 25 MILLIGRAM(S): at 05:41

## 2019-05-10 RX ADMIN — PANTOPRAZOLE SODIUM 40 MILLIGRAM(S): 20 TABLET, DELAYED RELEASE ORAL at 06:16

## 2019-05-10 RX ADMIN — ZINC OXIDE 1 APPLICATION(S): 200 OINTMENT TOPICAL at 11:48

## 2019-05-10 RX ADMIN — Medication 650 MILLIGRAM(S): at 08:38

## 2019-05-10 RX ADMIN — PREGABALIN 1000 MICROGRAM(S): 225 CAPSULE ORAL at 11:48

## 2019-05-10 RX ADMIN — Medication 250 MILLIGRAM(S): at 05:41

## 2019-05-10 RX ADMIN — Medication 650 MILLIGRAM(S): at 00:43

## 2019-05-10 RX ADMIN — Medication 650 MILLIGRAM(S): at 07:34

## 2019-05-10 NOTE — PROGRESS NOTE ADULT - ATTENDING COMMENTS
No acute events overnight.   Neurological exam is unchanged.  Elevated WBC count noted, temperature 99.6 rectally.  Case discussed with Medicine and ID ( evaluation requested)  Work up is in progress  I contacted patient's primary Cardiologist Dr. Ventura [phone] at family's request to discuss her medications, message left.  Depakote level  Resume Lovenox for DVT ppx, cleared by GI.

## 2019-05-10 NOTE — CHART NOTE - NSCHARTNOTEFT_GEN_A_CORE
Upon Nutritional Assessment by the Registered Dietitian your patient was determined to meet criteria / has evidence of the following diagnosis/diagnoses:          [X] Severe Protein Calorie Malnutrition    Findings as based on:  [X] Comprehensive Nutrition Assessment   [X] Nutrition Focused Physical Exam - Temporal, Orbital, Clavicle, Thigh, Calf & Hand Wasting Observed  [X] Other: Poor PO Intake 7+ Days     Nutrition Plan/Recommendations:    1) Ensure Pudding 4oz PO TID(Provides 510kcal & 12grams of Protein)  (Declines Ensure Enlive or Ensure Clear)    PROVIDER Section:   By signing this assessment you are acknowledging and agree with the diagnosis/diagnoses assigned by the Registered Dietitian    Nj Panda RDN

## 2019-05-10 NOTE — DIETITIAN INITIAL EVALUATION ADULT. - OTHER INFO
59yr Old Female - Dx: Debilty - Initial Assessment - Regular Diet w/ Thin Liquids (Recommend Ensure Pudding 4oz PO TID), Shellfish Food Allergy, Tolerates Diet Well, No Chewing/Swallowing Complications (Per Patient), No Pressure Ulcers (as Per Flow Sheets), No Edema Noted (as Per Flow Sheets), No Recent Vomiting

## 2019-05-10 NOTE — CONSULT NOTE ADULT - ASSESSMENT
59 year old female s/p suspected CVA vs. encephalitis admitted to rehab with left-sided weakness and functional deficits.     #r/o CVA  continue ASA  GI ppx    #Tachycardia  - continue metoprolol BID, monitor closely    #ILD  -pulmicort, xopenex, spiriva  -on home O2 PRN.     #Seizure  Continue Depakote, seizure ppx, f/u neurology    #constipation/ rectal pain  GI evaluation of nausea and rectal pain, bowel regimen for constipation    #back pain  Tylenol and lidocaine patch.     #Vitamin B12 deiciency  continue cyanocobalamin 1000 MICROGram(s) Oral daily    # GERD  continue pantoprazole 59 year old female s/p suspected CVA vs. encephalitis admitted to rehab with left-sided weakness and functional deficits.     #r/o CVA  continue ASA  GI ppx    #Tachycardia  continue metoprolol BID, monitor closely    #ILD  pulmicort, xopenex, spiriva  on home O2 PRN.     #Seizure  Continue Depakote, seizure ppx, f/u neurology    #constipation/ rectal pain  GI evaluation of nausea and rectal pain, bowel regimen for constipation    #back pain  Tylenol and lidocaine patch.     #Vitamin B12 deficiency  patient reports she is vegetarian  continue cyanocobalamin 1000 MICROGram(s) Oral daily    # GERD  continue pantoprazole     #DVT ppx  consider Lovenox,  f/u neurology

## 2019-05-10 NOTE — PROGRESS NOTE ADULT - ASSESSMENT
60 yo F, remote lymphoma, pulm nodules, ILD, chronic b/l SDH, prolonged hosp since March for neuro decompensation- new onset Sz, ?cerebral ischemia vs air embolism, as well tachycardia    She was briefly at rehab, only to require admission for rectal bleed- mult Units PRBCs, ICU monitoring, pressors- covered for sepsis    CT with basilar infiltrates, changes on CXR, might question pneumonia, but difficult to distinguish from chronic, underlying lung dz  Bld Cxs negative   Completed course Vanco and zosyn  Afebrile, alert, WBC improved, PCT lower, hemodynamically stable, H/H stable, sent back to Rehab   Remains afebrile, but now rise in WBC  No other new findings; PCT level normal  Doubt emerging infection    Plan:  Observe off antibiotics.   Follow temps and CBC/diff   D/w Dr Walton

## 2019-05-10 NOTE — PROGRESS NOTE ADULT - SUBJECTIVE AND OBJECTIVE BOX
CC: asked to reassess for leukocytosis    Patient remains comfortable, alert, no resp Sxs; only c/o anal discomfort with BMs    REVIEW OF SYSTEMS:  All other review of systems negative (Comprehensive ROS)    Antimicrobials off  Medications Reviewed    Vital Signs Last 24 Hrs  T(F): 97.9 (10 May 2019 07:30), Max: 98.5 (09 May 2019 21:45)  HR: 76 (10 May 2019 09:37) (76 - 103)  BP: 106/73 (10 May 2019 07:30) (100/65 - 131/67)  BP(mean): --  RR: 14 (10 May 2019 07:30) (14 - 14)  SpO2: 96% (10 May 2019 09:37) (96% - 98%)    PHYSICAL EXAM:  General: alert, no acute distress, frail   Eyes:  anicteric, no conjunctival injection, no discharge  Oropharynx: no lesions or injection 	  Neck: supple, without adenopathy  Lungs: coarse BS  Heart: regular rate and rhythm; no murmur, rubs or gallops  Abdomen: soft, nondistended, nontender, without mass or organomegaly  Skin: no lesions  Extremities: no edema  Neurologic: alert, oriented, moves all extremities    LAB RESULTS:                        12.2   18.0  )-----------( 451      ( 10 May 2019 11:42 )             38.4   05-10    137  |  101  |  22  ----------------------------<  87  4.1   |  30  |  0.77    Ca    9.8      10 May 2019 06:05    TPro  6.4  /  Alb  2.6<L>  /  TBili  0.3  /  DBili  x   /  AST  14  /  ALT  14  /  AlkPhos  85  05-10    Procalcitonin, Serum (05.10.19 @ 11:42)    Procalcitonin, Serum: 0.09    MICROBIOLOGY:  RECENT CULTURES:  Culture - Blood (04.27.19 @ 08:10)    Specimen Source: .Blood Blood    Culture Results:   No growth at 5 days.    RADIOLOGY REVIEWED

## 2019-05-10 NOTE — DIETITIAN INITIAL EVALUATION ADULT. - PHYSICAL APPEARANCE
Temporal, Orbital, Clavicle, Thigh, Calf & Hand Wasting Observed  (Per Nutrition Focused Physical Exam)/emaciated

## 2019-05-10 NOTE — PROGRESS NOTE ADULT - SUBJECTIVE AND OBJECTIVE BOX
CHIEF COMPLAINT: back pain, rectal pain, weakness. Insomnia.       HISTORY OF PRESENT ILLNESS  58 yo female with PMHx of MVP, chronic SDH, interstitial lung disease/pneumothorax, pulmonary nodules, meniere's, non hodgkins lymphoma (SCD/XRT/chemo at [facility] 2003), TIA, tachycardia, occipital neuralgia, and neuropathy.  Patient was initially admitted to [facility] on 3/13/19 with acute onset of b/l UE weakness associated with nausea, blurry vision, and HA. CT head showed chronic B/L frontal SDH. The rest of the workup was negative. CVA/TIA r/out.  Pt was discharged home but on 3/17 had apparent seizure.  She was admitted to [facility] and intubated and placed on depakote.  CTA H/N, MRI, EEG unremarkable.  ID Recommended LP for fever; family deferred.  Transferred to [facility] on 3/19/19.  MRI brain showed cerebral ischemia. The rest of the workup was negative, this including CSF studies. EEG neg seizures. (Evaluated by lymphoma specialist/onco, PET 4/5/19 r/o recurrence of lymphoma. Increased signal at base of tongue to follow up outpt ENT). Additionally, course complicated with pneumothorax, pulmonary consulted, no interventions, self resolved.  Tachycardia - per pulm, related related to chronic lung disease.  GI consulted for rectal pain; diagnosed c anal fissure/constipation, bowel regimen started. KUB 4/12/19 No stool, no free air, no distended loops of small bowel, fibroids. Refused further workup.   Other issues while admitted:  Hypotension - home diltiazem held per cardio  dizziness - sporadic, self resolving.  Pt deferred MRI.  May be 2/2 to hx of Meniere's disease.      *TTE 3/18/19 EF 60%, mod-severe MR.   MRI brain 3/29/19 cortically based restricted diffusion within R>L frontoparietal region as well as additional scattered small foci, may represent evolution of a recent ischemia vs improving encephalitis. Patient medically optimized and cleared for discharge to acute rehab at [facility] on 4/19/19.   At [facility] patient presented with rectal pain, fluctuating tachycardia 110-150 at rest and orthostatic hypotension. GI, hematology, cardiology and pulmonary evaluation requested. Rectal bleeding developed in the morning of 4/21/19 with severe hypotension and tachycardia. RRT called. Patient transferred to ICU. Patient's course in ICU reviewed and discussed with staff. Investigations, current lab results and recent treatments also reviewed and discussed. Patient was attempted to be evaluated by PT twice but unable to tolerate evaluation due to severe tachycardia up to 150 at rest and symptomatic orthostatic hypotension as low as 80s. Patient is preparing for colonoscopy today to evaluate the source of rectal bleed. S/p multiple PRBC transfusions. Presently on IV antibiotics and IV steroids. (09 May 2019 12:11)      PAST MEDICAL & SURGICAL HISTORY:  Interstitial lung disease: on home o2 prn  NHL (non-Hodgkin's lymphoma): Agem 43 sp chemo/rt/stem cell  Transient cerebral ischemia, unspecified type  Mitral prolapse  History of tonsillectomy  History of appendectomy       REVIEW OF SYMPTOMS  [X] Constitutional WNL          [X]  WNL                   [X] Heme WNL              [X] Endo WNL                     [X] Skin WNL                        [X] Cognitive WNL         VITALS  Vital Signs Last 24 Hrs  T(C): 36.6 (10 May 2019 07:30), Max: 36.9 (09 May 2019 16:53)  T(F): 97.9 (10 May 2019 07:30), Max: 98.5 (09 May 2019 21:45)  HR: 76 (10 May 2019 09:37) (76 - 103)  BP: 106/73 (10 May 2019 07:30) (99/66 - 131/67)  BP(mean): --  RR: 14 (10 May 2019 07:30) (14 - 14)  SpO2: 96% (10 May 2019 09:37) (96% - 98%)      PHYSICAL EXAM  Constitutional - Fragile/weak  HEENT - NCAT, EOMI  Neck - Supple, No limited ROM  Chest - Diminished, No wheeze, No rhonchi, No crackles  Cardiovascular - RRR, S1S2, Tachy  Abdomen - BS+, Soft, NTND  Extremities - No C/C/E, No calf tenderness   Skin-no rash      Neurologic Exam - no new deficits on exam this am, alert and awake.                    Balance - poor     Psychiatric - depressed         FUNCTIONAL PROGRESS  Gait - eval  ADLs - eval  Transfers -eval  Functional transfer - eval    RECENT LABS                        11.7   19.4  )-----------( 439      ( 10 May 2019 06:05 )             36.6     05-10    137  |  101  |  22  ----------------------------<  87  4.1   |  30  |  0.77    Ca    9.8      10 May 2019 06:05    TPro  6.4  /  Alb  2.6<L>  /  TBili  0.3  /  DBili  x   /  AST  14  /  ALT  14  /  AlkPhos  85  05-10      LIVER FUNCTIONS - ( 10 May 2019 06:05 )  Alb: 2.6 g/dL / Pro: 6.4 g/dL / ALK PHOS: 85 U/L / ALT: 14 U/L DA / AST: 14 U/L / GGT: x             Direct LDL: 109 mg/dL (03-13-19 @ 11:50)      Valproic Acid Level, Serum: 60 ug/mL (04-22-19 @ 13:40)            RADIOLOGY/OTHER RESULTS      CURRENT MEDICATIONS  MEDICATIONS  (STANDING):  aspirin enteric coated 81 milliGRAM(s) Oral daily  buDESOnide  80 MICROgram(s)/formoterol 4.5 MICROgram(s) Inhaler 2 Puff(s) Inhalation two times a day  cyanocobalamin 1000 MICROGram(s) Oral daily  enoxaparin Injectable 40 milliGRAM(s) SubCutaneous daily  lidocaine   Patch 1 Patch Transdermal <User Schedule>  metoprolol tartrate 25 milliGRAM(s) Oral two times a day  pantoprazole    Tablet 40 milliGRAM(s) Oral before breakfast  tiotropium 18 MICROgram(s) Capsule 1 Capsule(s) Inhalation daily  valproic acid 250 milliGRAM(s) Oral every 12 hours  zinc oxide 20% Ointment 1 Application(s) Topical daily    MEDICATIONS  (PRN):  acetaminophen   Tablet .. 650 milliGRAM(s) Oral every 6 hours PRN Moderate Pain (4 - 6)  ALPRAZolam 0.25 milliGRAM(s) Oral three times a day PRN anxiety  docusate sodium 100 milliGRAM(s) Oral daily PRN Constipation  melatonin Oral Tab/Cap - Peds 3 milliGRAM(s) Oral at bedtime PRN Insomnia      ASSESSMENT & PLAN          GI/Bowel Management - Colace/Miralax   Management - Toilet Q2  Skin - Turn Q2  Pain - Tylenol PRN/Lidocaine  DVT PPX - TEDs, add Lovenox  Diet - reg    Start comprehensive acute rehab program consisting of 3hrs/day of OT/PT and SLP.

## 2019-05-10 NOTE — CONSULT NOTE ADULT - SUBJECTIVE AND OBJECTIVE BOX
Patient is a 59 year old female who presents with a chief complaint of debility/functional deficits (09 May 2019 12:11)    HPI: 59 year old female with history of MVP, chronic SDH, interstitial lung disease/pneumothorax, pulmonary nodules, Meniere's disease, non hodgkins lymphoma (SCD/XRT/chemo at [facility] 2003), TIA, tachycardia, occipital neuralgia, and neuropathy.  Patient was initially admitted to [facility] on 3/13/2019 with acute onset of bilateral UE weakness associated with nausea, blurry vision, and HA. CT head showed chronic bilateral frontal SDH. TTE 3/18/2019 showed EF 60%, mod-severe MR.   The rest of the workup was negative. CVA/TIA ruled - out.  Patient was discharged home but on 3/17/2019, had apparent seizure.  She was admitted to [facility] and intubated and placed on depakote.  CTA H/N, MRI, EEG unremarkable.  ID Recommended LP for fever; family deferred.  Transferred to [facility] on 3/19/2019.  MRI brain showed cerebral ischemia. The rest of the workup was negative, this including CSF studies. EEG neg seizures. (Evaluated by lymphoma specialist/onco, PET 4/5/2019 r/o recurrence of lymphoma. Increased signal at base of tongue to follow up outpt ENT). Additionally, course complicated with pneumothorax, pulmonary consulted, no interventions, self resolved.  Tachycardia - per pulm, related to chronic lung disease.  GI consulted for rectal pain; diagnosed with anal fissure/constipation, bowel regimen started. KUB 4/12/2019 showed no stool, no free air, no distended loops of small bowel, fibroids. Refused further workup.   Other issues while admitted:  Hypotension - home diltiazem held per cardio  dizziness - sporadic, self resolving.  Pt deferred MRI.  May be secondary to history of Meniere's disease.      MRI brain 3/29/2019 cortically based restricted diffusion within R>L frontoparietal region as well as additional scattered small foci, may represent evolution of a recent ischemia vs improving encephalitis. Patient medically optimized and cleared for discharge to acute rehab at [facility] on 4/19/2019.   At [facility] patient presented with rectal pain, fluctuating tachycardia 110-150 at rest and orthostatic hypotension. GI, hematology, cardiology and pulmonary evaluation requested. Rectal bleeding developed in the morning of 4/21/2019 with severe hypotension and tachycardia. RRT was called. Patient transferred to ICU. Patient's course in ICU reviewed and discussed with staff. Investigations, current lab results and recent treatments also reviewed and discussed. Patient was attempted to be evaluated by PT twice but unable to tolerate evaluation due to severe tachycardia up to 150 at rest and symptomatic orthostatic hypotension as low as 80s. Patient underwent EGD and colonoscopy to evaluate the source of rectal bleed. S/p multiple PRBC transfusions. EGD and Colonoscopy were negative.    PAST MEDICAL HISTORY:  Interstitial lung disease on home o2 prn  Mitral prolapse   NHL (non-Hodgkin's lymphoma) Agem 45 sp chemo/rt/stem cell  Transient cerebral ischemia, unspecified type.     PAST SURGICAL HISTORY:  History of appendectomy   History of tonsillectomy.     FAMILY HISTORY:  Family history of breast cancer, Mother  Family history of stroke.    SOCIAL HISTORY:  Patient currently denies smoking, former smoker, denies alcohol, illicit drug use. .  Lives with , daughter in private house.  3STE, bathroom downstairs	    ALLERGIES: IV Contrast: Drug, Anaphylaxis  	Shellfish: Drug Category, Anaphylaxis      MEDICATIONS  (STANDING):  aspirin enteric coated 81 milliGRAM(s) Oral daily  buDESOnide   0.25 milliGRAM(s) Respule 0.25 milliGRAM(s) Inhalation daily  cyanocobalamin 1000 MICROGram(s) Oral daily  lidocaine   Patch 1 Patch Transdermal <User Schedule>  metoprolol tartrate 25 milliGRAM(s) Oral two times a day  pantoprazole    Tablet 40 milliGRAM(s) Oral before breakfast  tiotropium 18 MICROgram(s) Capsule 1 Capsule(s) Inhalation daily  valproic acid 250 milliGRAM(s) Oral every 12 hours  zinc oxide 20% Ointment 1 Application(s) Topical daily    MEDICATIONS  (PRN):  acetaminophen   Tablet .. 650 milliGRAM(s) Oral every 6 hours PRN Moderate Pain (4 - 6)  ALPRAZolam 0.25 milliGRAM(s) Oral three times a day PRN anxiety  docusate sodium 100 milliGRAM(s) Oral daily PRN Constipation  melatonin Oral Tab/Cap - Peds 3 milliGRAM(s) Oral at bedtime PRN Insomnia      REVIEW OF SYSTEMS:  CONSTITUTIONAL: No fever, weight loss, has fatigue  EYES: No eye pain, visual disturbances, or discharge  ENMT:  No difficulty hearing, tinnitus, vertigo; No sinus or throat pain  NECK: No pain or stiffness  RESPIRATORY: No cough, wheezing, chills or hemoptysis; intermittent shortness of breath  CARDIOVASCULAR: No chest pain, palpitations, dizziness, or leg swelling  GASTROINTESTINAL: No abdominal or epigastric pain.  has nausea, no vomiting, or hematemesis; No diarrhea, has constipation, rectal pain. No melena or hematochezia.  GENITOURINARY: No dysuria, frequency, hematuria, or incontinence  NEUROLOGICAL: No headaches, memory loss, has loss of strength, and numbness, no tremors  SKIN: No itching, burning, rashes, or lesions   ENDOCRINE: No heat or cold intolerance; No hair loss  MUSCULOSKELETAL: No joint pain or swelling; No muscle pain, has back pain  PSYCHIATRIC: has depression, anxiety, mood swings, difficulty sleeping  HEME/LYMPH: No easy bruising, or bleeding gums  ALLERGY AND IMMUNOLOGIC: No hives or eczema        PHYSICAL EXAM:  T(C): 36.7 (05-10-19 @ 03:45), Max: 36.9 (05-09-19 @ 16:53)  HR: 95 (05-10-19 @ 05:31) (95 - 103)  BP: 107/72 (05-10-19 @ 05:31) (97/73 - 131/67)  RR: 14 (05-10-19 @ 05:31) (14 - 14)  SpO2: 97% (05-10-19 @ 05:31) (95% - 98%)  I&O's Summary    09 May 2019 07:01  -  10 May 2019 07:00  --------------------------------------------------------  IN: 0 mL / OUT: 1 mL / NET: -1 mL        GENERAL: NAD,   HEAD:  Atraumatic, Normocephalic  EYES: EOMI, PERRL, conjunctiva and sclera clear  ENMT:  Moist mucous membranes, Good dentition, No lesions  NECK: Supple, No JVD, Normal thyroid  NERVOUS SYSTEM:  Alert & Oriented X3, has LUE weakness> RUE, mild LLE weakness, impaired sensation LUE and LLE CHEST/LUNG: Clear to ascultation bilaterally; No rales, rhonchi, wheezing, or rubs  HEART: Regular rate and rhythm; has murmurs, no rubs, or gallops  ABDOMEN: Soft, Nontender, Nondistended; Bowel sounds present  EXTREMITIES:  2+ Peripheral Pulses, No clubbing, cyanosis, or edema  SKIN: No rash  PSYCH - emotional, depressed, anxious    LABS: pending      RADIOLOGY & ADDITIONAL TESTS:    Consultant(s) Notes Reviewed:  [x] YES  [ ] NO    Care Discussed with Consultants/Other Providers [x] YES  [ ] NO Patient is a 59 year old female who presents with a chief complaint of debility/functional deficits (09 May 2019 12:11)    HPI: 59 year old female with history of MVP, chronic SDH, interstitial lung disease/pneumothorax, pulmonary nodules, Meniere's disease, non hodgkins lymphoma (SCD/XRT/chemo at [facility] 2003), TIA, tachycardia, occipital neuralgia, and neuropathy.  Patient was initially admitted to [facility] on 3/13/2019 with acute onset of bilateral UE weakness associated with nausea, blurry vision, and HA. CT head showed chronic bilateral frontal SDH. TTE 3/18/2019 showed EF 60%, mod-severe MR.   The rest of the workup was negative. CVA/TIA ruled - out.  Patient was discharged home but on 3/17/2019, had apparent seizure.  She was admitted to [facility] and intubated and placed on depakote.  CTA H/N, MRI, EEG unremarkable.  ID Recommended LP for fever; family deferred.  Transferred to [facility] on 3/19/2019.  MRI brain showed cerebral ischemia. The rest of the workup was negative, this including CSF studies. EEG neg seizures. (Evaluated by lymphoma specialist/onco, PET 4/5/2019 r/o recurrence of lymphoma. Increased signal at base of tongue to follow up outpt ENT). Additionally, course complicated with pneumothorax, pulmonary consulted, no interventions, self resolved.  Tachycardia - per pulm, related to chronic lung disease.  GI consulted for rectal pain; diagnosed with anal fissure/constipation, bowel regimen started. KUB 4/12/2019 showed no stool, no free air, no distended loops of small bowel, fibroids. Refused further workup.   Other issues while admitted:  Hypotension - home diltiazem held per cardio  dizziness - sporadic, self resolving.  Pt deferred MRI.  May be secondary to history of Meniere's disease.      MRI brain 3/29/2019 cortically based restricted diffusion within R>L frontoparietal region as well as additional scattered small foci, may represent evolution of a recent ischemia vs improving encephalitis. Patient medically optimized and cleared for discharge to acute rehab at [facility] on 4/19/2019.   At [facility] patient presented with rectal pain, fluctuating tachycardia 110-150 at rest and orthostatic hypotension. GI, hematology, cardiology and pulmonary evaluation requested. Rectal bleeding developed in the morning of 4/21/2019 with severe hypotension and tachycardia. RRT was called. Patient transferred to ICU. Patient's course in ICU reviewed and discussed with staff. Investigations, current lab results and recent treatments also reviewed and discussed. Patient was attempted to be evaluated by PT twice but unable to tolerate evaluation due to severe tachycardia up to 150 at rest and symptomatic orthostatic hypotension as low as 80s. Patient underwent EGD and colonoscopy to evaluate the source of rectal bleed. S/p multiple PRBC transfusions. EGD and Colonoscopy were negative. Patient seen and examined reports constipation and rectal pain.    PAST MEDICAL HISTORY:  Interstitial lung disease on home o2 prn  Mitral prolapse   NHL (non-Hodgkin's lymphoma) Agem 45 sp chemo/rt/stem cell  Transient cerebral ischemia, unspecified type.     PAST SURGICAL HISTORY:  History of appendectomy   History of tonsillectomy.     FAMILY HISTORY:  Family history of breast cancer, Mother  Family history of stroke.    SOCIAL HISTORY:  Patient currently denies smoking, former smoker, states quit several years ago, does not remedenies alcohol, illicit drug use. .  Lives with , daughter in private house.  3STE, bathroom downstairs	    ALLERGIES: IV Contrast: Drug, Anaphylaxis  	Shellfish: Drug Category, Anaphylaxis      MEDICATIONS  (STANDING):  aspirin enteric coated 81 milliGRAM(s) Oral daily  buDESOnide   0.25 milliGRAM(s) Respule 0.25 milliGRAM(s) Inhalation daily  cyanocobalamin 1000 MICROGram(s) Oral daily  lidocaine   Patch 1 Patch Transdermal <User Schedule>  metoprolol tartrate 25 milliGRAM(s) Oral two times a day  pantoprazole    Tablet 40 milliGRAM(s) Oral before breakfast  tiotropium 18 MICROgram(s) Capsule 1 Capsule(s) Inhalation daily  valproic acid 250 milliGRAM(s) Oral every 12 hours  zinc oxide 20% Ointment 1 Application(s) Topical daily    MEDICATIONS  (PRN):  acetaminophen   Tablet .. 650 milliGRAM(s) Oral every 6 hours PRN Moderate Pain (4 - 6)  ALPRAZolam 0.25 milliGRAM(s) Oral three times a day PRN anxiety  docusate sodium 100 milliGRAM(s) Oral daily PRN Constipation  melatonin Oral Tab/Cap - Peds 3 milliGRAM(s) Oral at bedtime PRN Insomnia      REVIEW OF SYSTEMS:  CONSTITUTIONAL: No fever, weight loss, has fatigue  EYES: No eye pain, visual disturbances, or discharge  ENMT:  No difficulty hearing, tinnitus, vertigo; No sinus or throat pain  NECK: No pain or stiffness  RESPIRATORY: No cough, wheezing, chills or hemoptysis; intermittent shortness of breath  CARDIOVASCULAR: No chest pain, palpitations, dizziness, or leg swelling  GASTROINTESTINAL: No abdominal or epigastric pain.  has nausea, no vomiting, or hematemesis; No diarrhea, has constipation, rectal pain. No melena or hematochezia.  GENITOURINARY: No dysuria, frequency, hematuria, or incontinence  NEUROLOGICAL: No headaches, memory loss, has loss of strength, and numbness, no tremors  SKIN: No itching, burning, rashes, or lesions   ENDOCRINE: No heat or cold intolerance; No hair loss  MUSCULOSKELETAL: No joint pain or swelling; No muscle pain, has back pain  PSYCHIATRIC: has depression, anxiety, mood swings, difficulty sleeping  HEME/LYMPH: No easy bruising, or bleeding gums  ALLERGY AND IMMUNOLOGIC: No hives or eczema        PHYSICAL EXAM:  T(C): 36.7 (05-10-19 @ 03:45), Max: 36.9 (05-09-19 @ 16:53)  HR: 95 (05-10-19 @ 05:31) (95 - 103)  BP: 107/72 (05-10-19 @ 05:31) (97/73 - 131/67)  RR: 14 (05-10-19 @ 05:31) (14 - 14)  SpO2: 97% (05-10-19 @ 05:31) (95% - 98%)  I&O's Summary    09 May 2019 07:01  -  10 May 2019 07:00  --------------------------------------------------------  IN: 0 mL / OUT: 1 mL / NET: -1 mL        GENERAL: NAD,   HEAD:  Atraumatic, Normocephalic  EYES: EOMI, PERRL, conjunctiva and sclera clear  ENMT:  Moist mucous membranes, Good dentition, No lesions  NECK: Supple, No JVD, Normal thyroid  NERVOUS SYSTEM:  Alert & Oriented X3, has LUE weakness> RUE, mild LLE weakness, impaired sensation LUE and LLE CHEST/LUNG: Clear to ascultation bilaterally; No rales, rhonchi, wheezing, or rubs  HEART: Regular rate and rhythm; has murmurs, no rubs, or gallops  ABDOMEN: Soft, Nontender, Nondistended; Bowel sounds present  EXTREMITIES:  2+ Peripheral Pulses, No clubbing, cyanosis, or edema  SKIN: No rash  PSYCH - emotional, depressed, anxious    LABS: pending      RADIOLOGY & ADDITIONAL TESTS:    Consultant(s) Notes Reviewed:  [x] YES  [ ] NO    Care Discussed with Consultants/Other Providers [x] YES  [ ] NO Patient is a 59 year old female who presents with a chief complaint of debility/functional deficits (09 May 2019 12:11)    HPI: 59 year old female with history of MVP, chronic SDH, interstitial lung disease/pneumothorax, pulmonary nodules, Meniere's disease, non hodgkins lymphoma (SCD/XRT/chemo at [facility] 2003), TIA, tachycardia, occipital neuralgia, and neuropathy.  Patient was initially admitted to [facility] on 3/13/2019 with acute onset of bilateral UE weakness associated with nausea, blurry vision, and HA. CT head showed chronic bilateral frontal SDH. TTE 3/18/2019 showed EF 60%, mod-severe MR.   The rest of the workup was negative. CVA/TIA ruled - out.  Patient was discharged home but on 3/17/2019, had apparent seizure.  She was admitted to [facility] and intubated and placed on depakote.  CTA H/N, MRI, EEG unremarkable.  ID Recommended LP for fever; family deferred.  Transferred to [facility] on 3/19/2019.  MRI brain showed cerebral ischemia. The rest of the workup was negative, this including CSF studies. EEG neg seizures. (Evaluated by lymphoma specialist/onco, PET 4/5/2019 r/o recurrence of lymphoma. Increased signal at base of tongue to follow up outpt ENT). Additionally, course complicated with pneumothorax, pulmonary consulted, no interventions, self resolved.  Tachycardia - per pulm, related to chronic lung disease.  GI consulted for rectal pain; diagnosed with anal fissure/constipation, bowel regimen started. KUB 4/12/2019 showed no stool, no free air, no distended loops of small bowel, fibroids. Refused further workup.   Other issues while admitted:  Hypotension - home diltiazem held per cardiology  dizziness - sporadic, self resolving.  Pt deferred MRI.  May be secondary to history of Meniere's disease.      MRI brain 3/29/2019 cortically based restricted diffusion within R>L frontoparietal region as well as additional scattered small foci, may represent evolution of a recent ischemia vs improving encephalitis. Patient medically optimized and cleared for discharge to acute rehab at [facility] on 4/19/2019.   At [facility] patient presented with rectal pain, fluctuating tachycardia 110-150 at rest and orthostatic hypotension. GI, hematology, cardiology and pulmonary evaluation requested. Rectal bleeding developed in the morning of 4/21/2019 with severe hypotension and tachycardia. RRT was called. Patient transferred to ICU. Patient's course in ICU reviewed and discussed with staff. Investigations, current lab results and recent treatments also reviewed and discussed. Patient was attempted to be evaluated by PT twice but unable to tolerate evaluation due to severe tachycardia up to 150 at rest and symptomatic orthostatic hypotension as low as 80s. Patient underwent EGD and colonoscopy to evaluate the source of rectal bleed. S/p multiple PRBC transfusions. EGD and Colonoscopy were negative. Patient seen and examined reports constipation and rectal pain.    PAST MEDICAL HISTORY:  Interstitial lung disease on home o2 prn  Mitral prolapse   NHL (non-Hodgkin's lymphoma) Agem 45 sp chemo/rt/stem cell  Transient cerebral ischemia, unspecified type.     PAST SURGICAL HISTORY:  History of appendectomy   History of tonsillectomy.     FAMILY HISTORY:  Family history of breast cancer, Mother  Family history of stroke.    SOCIAL HISTORY:  Patient currently denies smoking, former smoker, states quit several years ago, does not remember which date, denies alcohol, illicit drug use. .  Lives with , daughter in private house.  3STE, bathroom downstairs	    ALLERGIES: IV Contrast: Drug, Anaphylaxis  	Shellfish: Drug Category, Anaphylaxis      MEDICATIONS  (STANDING):  aspirin enteric coated 81 milliGRAM(s) Oral daily  buDESOnide   0.25 milliGRAM(s) Respule 0.25 milliGRAM(s) Inhalation daily  cyanocobalamin 1000 MICROGram(s) Oral daily  lidocaine   Patch 1 Patch Transdermal <User Schedule>  metoprolol tartrate 25 milliGRAM(s) Oral two times a day  pantoprazole    Tablet 40 milliGRAM(s) Oral before breakfast  tiotropium 18 MICROgram(s) Capsule 1 Capsule(s) Inhalation daily  valproic acid 250 milliGRAM(s) Oral every 12 hours  zinc oxide 20% Ointment 1 Application(s) Topical daily    MEDICATIONS  (PRN):  acetaminophen   Tablet .. 650 milliGRAM(s) Oral every 6 hours PRN Moderate Pain (4 - 6)  ALPRAZolam 0.25 milliGRAM(s) Oral three times a day PRN anxiety  docusate sodium 100 milliGRAM(s) Oral daily PRN Constipation  melatonin Oral Tab/Cap - Peds 3 milliGRAM(s) Oral at bedtime PRN Insomnia      REVIEW OF SYSTEMS:  CONSTITUTIONAL: No fever, weight loss, has fatigue  EYES: No eye pain, visual disturbances, or discharge  ENMT:  No difficulty hearing, tinnitus, vertigo; No sinus or throat pain  NECK: No pain or stiffness  RESPIRATORY: No cough, wheezing, chills or hemoptysis; intermittent shortness of breath  CARDIOVASCULAR: No chest pain, palpitations, dizziness, or leg swelling  GASTROINTESTINAL: No abdominal or epigastric pain.  has nausea, no vomiting, or hematemesis; No diarrhea, has constipation, rectal pain. No melena or hematochezia.  GENITOURINARY: No dysuria, frequency, hematuria, or incontinence  NEUROLOGICAL: No headaches, memory loss, has loss of strength, and numbness, no tremors  SKIN: No itching, burning, rashes, or lesions   ENDOCRINE: No heat or cold intolerance; No hair loss  MUSCULOSKELETAL: No joint pain or swelling; No muscle pain, has back pain  PSYCHIATRIC: has depression, anxiety, mood swings, difficulty sleeping  HEME/LYMPH: No easy bruising, or bleeding gums  ALLERGY AND IMMUNOLOGIC: No hives or eczema        PHYSICAL EXAM:  T(C): 36.7 (05-10-19 @ 03:45), Max: 36.9 (05-09-19 @ 16:53)  HR: 95 (05-10-19 @ 05:31) (95 - 103)  BP: 107/72 (05-10-19 @ 05:31) (97/73 - 131/67)  RR: 14 (05-10-19 @ 05:31) (14 - 14)  SpO2: 97% (05-10-19 @ 05:31) (95% - 98%)  I&O's Summary    09 May 2019 07:01  -  10 May 2019 07:00  --------------------------------------------------------  IN: 0 mL / OUT: 1 mL / NET: -1 mL        GENERAL: NAD,   HEAD:  Atraumatic, Normocephalic  EYES: EOMI, PERRL, conjunctiva and sclera clear  ENMT:  Moist mucous membranes, Good dentition, No lesions  NECK: Supple, No JVD, Normal thyroid  NERVOUS SYSTEM:  Alert & Oriented X3, has LUE weakness> RUE, mild LLE weakness, impaired sensation LUE and LLE CHEST/LUNG: Clear to ascultation bilaterally; No rales, rhonchi, wheezing, or rubs  HEART: Regular rate and rhythm; has murmurs, no rubs, or gallops  ABDOMEN: Soft, Nontender, Nondistended; Bowel sounds present  EXTREMITIES:  2+ Peripheral Pulses, No clubbing, cyanosis, or edema  SKIN: No rash  PSYCH - emotional, depressed, anxious    LABS: pending      RADIOLOGY & ADDITIONAL TESTS:    Consultant(s) Notes Reviewed:  [x] YES  [ ] NO    Care Discussed with Consultants/Other Providers [x] YES  [ ] NO

## 2019-05-11 DIAGNOSIS — K59.00 CONSTIPATION, UNSPECIFIED: ICD-10-CM

## 2019-05-11 LAB
BASOPHILS # BLD AUTO: 0.1 K/UL — SIGNIFICANT CHANGE UP (ref 0–0.2)
BASOPHILS NFR BLD AUTO: 0.7 % — SIGNIFICANT CHANGE UP (ref 0–2)
EOSINOPHIL # BLD AUTO: 0.3 K/UL — SIGNIFICANT CHANGE UP (ref 0–0.5)
EOSINOPHIL NFR BLD AUTO: 1.7 % — SIGNIFICANT CHANGE UP (ref 0–6)
HCT VFR BLD CALC: 35.1 % — SIGNIFICANT CHANGE UP (ref 34.5–45)
HGB BLD-MCNC: 11.2 G/DL — LOW (ref 11.5–15.5)
LYMPHOCYTES # BLD AUTO: 1.9 K/UL — SIGNIFICANT CHANGE UP (ref 1–3.3)
LYMPHOCYTES # BLD AUTO: 11.9 % — LOW (ref 13–44)
MCHC RBC-ENTMCNC: 30.3 PG — SIGNIFICANT CHANGE UP (ref 27–34)
MCHC RBC-ENTMCNC: 31.9 GM/DL — LOW (ref 32–36)
MCV RBC AUTO: 95 FL — SIGNIFICANT CHANGE UP (ref 80–100)
MONOCYTES # BLD AUTO: 1.6 K/UL — HIGH (ref 0–0.9)
MONOCYTES NFR BLD AUTO: 9.9 % — HIGH (ref 1–9)
NEUTROPHILS # BLD AUTO: 12.3 K/UL — HIGH (ref 1.8–7.4)
NEUTROPHILS NFR BLD AUTO: 75.8 % — SIGNIFICANT CHANGE UP (ref 43–77)
PLATELET # BLD AUTO: 431 K/UL — HIGH (ref 150–400)
RBC # BLD: 3.69 M/UL — LOW (ref 3.8–5.2)
RBC # FLD: 14.5 % — SIGNIFICANT CHANGE UP (ref 10.3–14.5)
WBC # BLD: 16.2 K/UL — HIGH (ref 3.8–10.5)
WBC # FLD AUTO: 16.2 K/UL — HIGH (ref 3.8–10.5)

## 2019-05-11 PROCEDURE — 99233 SBSQ HOSP IP/OBS HIGH 50: CPT

## 2019-05-11 RX ORDER — LUBIPROSTONE 24 UG/1
8 CAPSULE, GELATIN COATED ORAL
Refills: 0 | Status: DISCONTINUED | OUTPATIENT
Start: 2019-05-11 | End: 2019-05-25

## 2019-05-11 RX ORDER — ONDANSETRON 8 MG/1
4 TABLET, FILM COATED ORAL EVERY 6 HOURS
Refills: 0 | Status: COMPLETED | OUTPATIENT
Start: 2019-05-11 | End: 2019-05-13

## 2019-05-11 RX ADMIN — ZINC OXIDE 1 APPLICATION(S): 200 OINTMENT TOPICAL at 12:31

## 2019-05-11 RX ADMIN — Medication 650 MILLIGRAM(S): at 04:26

## 2019-05-11 RX ADMIN — Medication 25 MILLIGRAM(S): at 18:20

## 2019-05-11 RX ADMIN — PREGABALIN 1000 MICROGRAM(S): 225 CAPSULE ORAL at 12:30

## 2019-05-11 RX ADMIN — Medication 650 MILLIGRAM(S): at 12:31

## 2019-05-11 RX ADMIN — ONDANSETRON 4 MILLIGRAM(S): 8 TABLET, FILM COATED ORAL at 20:46

## 2019-05-11 RX ADMIN — LUBIPROSTONE 8 MICROGRAM(S): 24 CAPSULE, GELATIN COATED ORAL at 18:20

## 2019-05-11 RX ADMIN — ONDANSETRON 4 MILLIGRAM(S): 8 TABLET, FILM COATED ORAL at 10:51

## 2019-05-11 RX ADMIN — Medication 250 MILLIGRAM(S): at 18:20

## 2019-05-11 RX ADMIN — Medication 650 MILLIGRAM(S): at 05:24

## 2019-05-11 RX ADMIN — ENOXAPARIN SODIUM 40 MILLIGRAM(S): 100 INJECTION SUBCUTANEOUS at 12:30

## 2019-05-11 RX ADMIN — ONDANSETRON 4 MILLIGRAM(S): 8 TABLET, FILM COATED ORAL at 04:34

## 2019-05-11 RX ADMIN — Medication 250 MILLIGRAM(S): at 06:25

## 2019-05-11 RX ADMIN — Medication 650 MILLIGRAM(S): at 10:54

## 2019-05-11 RX ADMIN — PANTOPRAZOLE SODIUM 40 MILLIGRAM(S): 20 TABLET, DELAYED RELEASE ORAL at 06:25

## 2019-05-11 NOTE — PROGRESS NOTE ADULT - SUBJECTIVE AND OBJECTIVE BOX
Chief complaint: Weakness, insomnia    Patient is a 59y old  Female who presents with a chief complaint of debility/functional deficits (10 May 2019 16:04)    PAST MEDICAL & SURGICAL HISTORY:  Interstitial lung disease: on home o2 prn  NHL (non-Hodgkin's lymphoma): Agem 45 sp chemo/rt/stem cell  Transient cerebral ischemia, unspecified type  Mitral prolapse  History of tonsillectomy  History of appendectomy       REVIEW OF SYMPTOMS  [X] Constitutional WNL     [X] Cardio WNL            [X] Resp WNL                                [X]  WNL                [X] Endo WNL                     [X] Skin WNL                 [X] MSK WNL                           [X] Cognitive WNL             VITALS  Vital Signs Last 24 Hrs  T(C): 37 (11 May 2019 08:20), Max: 37 (11 May 2019 08:20)  T(F): 98.6 (11 May 2019 08:20), Max: 98.6 (11 May 2019 08:20)  HR: 100 (11 May 2019 08:38) (90 - 103)  BP: 100/66 (11 May 2019 08:20) (100/66 - 103/68)  BP(mean): --  RR: 14 (11 May 2019 08:20) (14 - 14)  SpO2: 95% (11 May 2019 08:38) (95% - 98%)      PHYSICAL EXAM  Constitutional - Fragile/weak  HEENT - NCAT, EOMI  Neck - Supple, No limited ROM  Chest - Diminished, No wheeze, No rhonchi, No crackles  Cardiovascular - RRR, S1S2, Tachy  Abdomen - BS+, Soft, NTND  Extremities - No C/C/E, No calf tenderness   Skin-no rash      Neurologic Exam - no new deficits on exam this am, alert and awake.                    Balance - poor     Psychiatric - depressed      RECENT LABS                        11.2   16.2  )-----------( 431      ( 11 May 2019 09:30 )             35.1     05-10    137  |  101  |  22  ----------------------------<  87  4.1   |  30  |  0.77    Ca    9.8      10 May 2019 06:05    TPro  6.4  /  Alb  2.6<L>  /  TBili  0.3  /  DBili  x   /  AST  14  /  ALT  14  /  AlkPhos  85  05-10      Urinalysis Basic - ( 10 May 2019 22:30 )    Color: Yellow / Appearance: Clear / S.020 / pH: x  Gluc: x / Ketone: Negative  / Bili: Negative / Urobili: Negative   Blood: x / Protein: 30 mg/dL / Nitrite: Negative   Leuk Esterase: Moderate / RBC: 0-4 /HPF / WBC 3-5 /HPF   Sq Epi: x / Non Sq Epi: Neg.-Few / Bacteria: Few /HPF          RADIOLOGY/OTHER RESULTS      CURRENT MEDICATIONS    MEDICATIONS  (STANDING):  aspirin enteric coated 81 milliGRAM(s) Oral daily  buDESOnide  80 MICROgram(s)/formoterol 4.5 MICROgram(s) Inhaler 2 Puff(s) Inhalation two times a day  cyanocobalamin 1000 MICROGram(s) Oral daily  enoxaparin Injectable 40 milliGRAM(s) SubCutaneous daily  lidocaine   Patch 1 Patch Transdermal <User Schedule>  metoprolol tartrate 25 milliGRAM(s) Oral two times a day  pantoprazole    Tablet 40 milliGRAM(s) Oral before breakfast  tiotropium 18 MICROgram(s) Capsule 1 Capsule(s) Inhalation daily  valproic acid 250 milliGRAM(s) Oral every 12 hours  zinc oxide 20% Ointment 1 Application(s) Topical daily    MEDICATIONS  (PRN):  acetaminophen   Tablet .. 650 milliGRAM(s) Oral every 6 hours PRN Moderate Pain (4 - 6)  ALPRAZolam 0.25 milliGRAM(s) Oral three times a day PRN anxiety  docusate sodium 100 milliGRAM(s) Oral daily PRN Constipation  melatonin Oral Tab/Cap - Peds 3 milliGRAM(s) Oral at bedtime PRN Insomnia  ondansetron    Tablet 4 milliGRAM(s) Oral every 6 hours PRN Nausea    ASSESSMENT & PLAN      GI/Bowel Management - Colace/Miralax   Management - Toilet Q2  Skin - Turn Q2  Pain - Tylenol PRN/Lidocaine  DVT PPX - TEDs, add Lovenox  Diet - reg    Continue comprehensive acute rehab program consisting of 3hrs/day of OT/PT and SLP.

## 2019-05-11 NOTE — PROGRESS NOTE ADULT - ASSESSMENT
Elderly female, known to me, complaining of rectal pain, likely secondary to constipation.  Refuses rectal exam.

## 2019-05-11 NOTE — PROGRESS NOTE ADULT - SUBJECTIVE AND OBJECTIVE BOX
Patient laying in bed comfortably.  No rectal pain at present time.  She does have the discomfort intermittently and she attributes this to constipation.  She has had similar problems in the past when constipated.  No abdominal pain.  Tolerating PO diet.    MEDICATIONS  (STANDING):  aspirin enteric coated 81 milliGRAM(s) Oral daily  buDESOnide  80 MICROgram(s)/formoterol 4.5 MICROgram(s) Inhaler 2 Puff(s) Inhalation two times a day  cyanocobalamin 1000 MICROGram(s) Oral daily  enoxaparin Injectable 40 milliGRAM(s) SubCutaneous daily  lidocaine   Patch 1 Patch Transdermal <User Schedule>  metoprolol tartrate 25 milliGRAM(s) Oral two times a day  pantoprazole    Tablet 40 milliGRAM(s) Oral before breakfast  tiotropium 18 MICROgram(s) Capsule 1 Capsule(s) Inhalation daily  valproic acid 250 milliGRAM(s) Oral every 12 hours  zinc oxide 20% Ointment 1 Application(s) Topical daily    MEDICATIONS  (PRN):  acetaminophen   Tablet .. 650 milliGRAM(s) Oral every 6 hours PRN Moderate Pain (4 - 6)  ALPRAZolam 0.25 milliGRAM(s) Oral three times a day PRN anxiety  docusate sodium 100 milliGRAM(s) Oral daily PRN Constipation  melatonin Oral Tab/Cap - Peds 3 milliGRAM(s) Oral at bedtime PRN Insomnia  ondansetron    Tablet 4 milliGRAM(s) Oral every 6 hours PRN Nausea      Allergies  IV Contrast (Anaphylaxis)  shellfish. (Anaphylaxis)      Vital Signs Last 24 Hrs  T(C): 37 (11 May 2019 08:20), Max: 37 (11 May 2019 08:20)  T(F): 98.6 (11 May 2019 08:20), Max: 98.6 (11 May 2019 08:20)  HR: 100 (11 May 2019 08:38) (90 - 103)  BP: 100/66 (11 May 2019 08:20) (100/66 - 103/68)  BP(mean): --  RR: 14 (11 May 2019 08:20) (14 - 14)  SpO2: 95% (11 May 2019 08:38) (95% - 98%)    PHYSICAL EXAM:    Constitutional: NAD, well-developed  Neck: No LAD, supple  Respiratory: clear to auscultation b/l no rales, rhonchi, wheezing  Cardiovascular: S1 and S2, RRR, no murmur  Gastrointestinal: +BS x4, soft, NT/ND, neg HSM,  Extremities: No peripheral edema, neg clubbing, cyanosis  Vascular: 2+ peripheral pulses  Neurological: A/O x 3, no focal deficits  Psychiatric: Normal mood, normal affect  Skin: No rashes      LABS:                        11.2   16.2  )-----------( 431      ( 11 May 2019 09:30 )             35.1     05-10    137  |  101  |  22  ----------------------------<  87  4.1   |  30  |  0.77    Ca    9.8      10 May 2019 06:05    TPro  6.4  /  Alb  2.6<L>  /  TBili  0.3  /  DBili  x   /  AST  14  /  ALT  14  /  AlkPhos  85  05-10      Urinalysis Basic - ( 10 May 2019 22:30 )    Color: Yellow / Appearance: Clear / S.020 / pH: x  Gluc: x / Ketone: Negative  / Bili: Negative / Urobili: Negative   Blood: x / Protein: 30 mg/dL / Nitrite: Negative   Leuk Esterase: Moderate / RBC: 0-4 /HPF / WBC 3-5 /HPF   Sq Epi: x / Non Sq Epi: Neg.-Few / Bacteria: Few /HPF    LIVER FUNCTIONS - ( 10 May 2019 06:05 )  Alb: 2.6 g/dL / Pro: 6.4 g/dL / ALK PHOS: 85 U/L / ALT: 14 U/L DA / AST: 14 U/L / GGT: x

## 2019-05-11 NOTE — PROGRESS NOTE ADULT - ASSESSMENT
60 yo female s/p suspected CVA vs. encephalitis admitted to rehab with left-sided weakness and functional deficits.     #r/o CVA  ASA continues c GI ppx, Lovenox SQ    #Leukocytosis  afebrile, improved, ID follow up today. Patient refused CXR as part of septic workup. Stool culture/foul stool will defer to ID.     #tachycardia  -metoprolol BID, controlled HR, monitor closely    #ILD  -pulmicort, spiriva  -on home O2 PRN.     #Seizure  Continue Depakote, seizure ppx    #rectal pain/hx of bleed  GI evaluation of nausea and rectal pain, bowel regimen for constipation. Spoke c Dr Singh-ok to start DVT ppx c Lovenox.     #back pain  Tylenol and lidocaine patch.    #debility  Lovenox SQ cleared by GI, Nutrition eval.           Precautions:           fall                                                                       Diet: regular    DVT Prophylaxis:      TEDs                                                                 Medical Prognosis: guarded

## 2019-05-12 PROCEDURE — 99232 SBSQ HOSP IP/OBS MODERATE 35: CPT

## 2019-05-12 RX ADMIN — PANTOPRAZOLE SODIUM 40 MILLIGRAM(S): 20 TABLET, DELAYED RELEASE ORAL at 05:42

## 2019-05-12 RX ADMIN — Medication 0.25 MILLIGRAM(S): at 00:30

## 2019-05-12 RX ADMIN — PREGABALIN 1000 MICROGRAM(S): 225 CAPSULE ORAL at 10:08

## 2019-05-12 RX ADMIN — ZINC OXIDE 1 APPLICATION(S): 200 OINTMENT TOPICAL at 11:35

## 2019-05-12 RX ADMIN — TIOTROPIUM BROMIDE 1 CAPSULE(S): 18 CAPSULE ORAL; RESPIRATORY (INHALATION) at 08:14

## 2019-05-12 RX ADMIN — Medication 650 MILLIGRAM(S): at 10:40

## 2019-05-12 RX ADMIN — Medication 250 MILLIGRAM(S): at 05:42

## 2019-05-12 RX ADMIN — BUDESONIDE AND FORMOTEROL FUMARATE DIHYDRATE 2 PUFF(S): 160; 4.5 AEROSOL RESPIRATORY (INHALATION) at 08:11

## 2019-05-12 RX ADMIN — Medication 650 MILLIGRAM(S): at 06:30

## 2019-05-12 RX ADMIN — Medication 650 MILLIGRAM(S): at 05:45

## 2019-05-12 RX ADMIN — Medication 650 MILLIGRAM(S): at 21:31

## 2019-05-12 RX ADMIN — Medication 650 MILLIGRAM(S): at 21:03

## 2019-05-12 RX ADMIN — LUBIPROSTONE 8 MICROGRAM(S): 24 CAPSULE, GELATIN COATED ORAL at 07:44

## 2019-05-12 RX ADMIN — Medication 650 MILLIGRAM(S): at 10:05

## 2019-05-12 RX ADMIN — Medication 250 MILLIGRAM(S): at 16:55

## 2019-05-12 RX ADMIN — ONDANSETRON 4 MILLIGRAM(S): 8 TABLET, FILM COATED ORAL at 11:47

## 2019-05-12 RX ADMIN — ENOXAPARIN SODIUM 40 MILLIGRAM(S): 100 INJECTION SUBCUTANEOUS at 11:36

## 2019-05-12 NOTE — PROGRESS NOTE ADULT - SUBJECTIVE AND OBJECTIVE BOX
Chief complaint: Rectal pain. Insomnia.     Patient is a 59y old  Female who presents with a chief complaint of debility/functional deficits (11 May 2019 15:45)    HEALTH ISSUES - PROBLEM Dx:  Constipation: Constipation       PAST MEDICAL & SURGICAL HISTORY:  Interstitial lung disease: on home o2 prn  NHL (non-Hodgkin's lymphoma): Agem 43 sp chemo/rt/stem cell  Transient cerebral ischemia, unspecified type  Mitral prolapse  History of tonsillectomy  History of appendectomy       REVIEW OF SYMPTOMS  [X] Constitutional WNL    [X] Resp WNL                            [X]  WNL                   [X] Heme WNL              [X] Endo WNL                     [X] Skin WNL                 [X] MSK WNL                             [X] Cognitive WNL         VITALS  Vital Signs Last 24 Hrs  T(C): 36.5 (12 May 2019 07:42), Max: 37.3 (11 May 2019 20:47)  T(F): 97.7 (12 May 2019 07:42), Max: 99.2 (11 May 2019 20:47)  HR: 96 (12 May 2019 07:42) (90 - 101)  BP: 99/67 (12 May 2019 07:42) (99/67 - 107/72)  BP(mean): --  RR: 15 (12 May 2019 07:42) (14 - 16)  SpO2: 96% (12 May 2019 07:42) (95% - 98%)      PHYSICAL EXAM  Constitutional - Fragile, malaise  HEENT - NCAT, EOMI  Neck - Supple, No limited ROM  Chest - Diminished  Cardiovascular - tachy, No murmurs  Abdomen - BS+, Soft, NTND  Extremities - No C/C/E, No calf tenderness   Neurologic Exam -                    Cognitive - Awake, Alert, AAO X3     No new focal deficits            RECENT LABS                        11.2   16.2  )-----------( 431      ( 11 May 2019 09:30 )             35.1             Urinalysis Basic - ( 10 May 2019 22:30 )    Color: Yellow / Appearance: Clear / S.020 / pH: x  Gluc: x / Ketone: Negative  / Bili: Negative / Urobili: Negative   Blood: x / Protein: 30 mg/dL / Nitrite: Negative   Leuk Esterase: Moderate / RBC: 0-4 /HPF / WBC 3-5 /HPF   Sq Epi: x / Non Sq Epi: Neg.-Few / Bacteria: Few /HPF          RADIOLOGY/OTHER RESULTS      CURRENT MEDICATIONS    MEDICATIONS  (STANDING):  aspirin enteric coated 81 milliGRAM(s) Oral daily  buDESOnide  80 MICROgram(s)/formoterol 4.5 MICROgram(s) Inhaler 2 Puff(s) Inhalation two times a day  cyanocobalamin 1000 MICROGram(s) Oral daily  enoxaparin Injectable 40 milliGRAM(s) SubCutaneous daily  lidocaine   Patch 1 Patch Transdermal <User Schedule>  lubiprostone 8 MICROGram(s) Oral two times a day  metoprolol tartrate 25 milliGRAM(s) Oral two times a day  pantoprazole    Tablet 40 milliGRAM(s) Oral before breakfast  tiotropium 18 MICROgram(s) Capsule 1 Capsule(s) Inhalation daily  valproic acid 250 milliGRAM(s) Oral every 12 hours  zinc oxide 20% Ointment 1 Application(s) Topical daily    MEDICATIONS  (PRN):  acetaminophen   Tablet .. 650 milliGRAM(s) Oral every 6 hours PRN Moderate Pain (4 - 6)  ALPRAZolam 0.25 milliGRAM(s) Oral three times a day PRN anxiety  docusate sodium 100 milliGRAM(s) Oral daily PRN Constipation  melatonin Oral Tab/Cap - Peds 3 milliGRAM(s) Oral at bedtime PRN Insomnia  ondansetron    Tablet 4 milliGRAM(s) Oral every 6 hours PRN Nausea    ASSESSMENT & PLAN          GI/Bowel Management - Amitiza   Management - Toilet Q2  Skin - Turn Q2  Pain - Tylenol PRN  DVT PPX - lovenox      Continue comprehensive acute rehab program consisting of 3hrs/day of OT/PT and SLP.

## 2019-05-12 NOTE — PROGRESS NOTE ADULT - ASSESSMENT
60 yo female s/p suspected CVA vs. encephalitis admitted to rehab with left-sided weakness and functional deficits.     #r/o CVA  ASA continues c GI ppx, Lovenox SQ. Tolerating well.     #Leukocytosis  afebrile, improved, ID follow up. Patient refused CXR as part of septic workup. Stool culture/foul stool will defer to ID.     #tachycardia  -metoprolol BID, controlled HR, monitor closely    #ILD  -pulmicort, spiriva  -on home O2 PRN.     #Seizure  Continue Depakote, seizure ppx    #rectal pain/hx of bleed  GI evaluation of nausea and rectal pain, bowel regimen for constipation. Spoke c Dr Singh-ok to start DVT ppx c Lovenox. Amitiza started.     #back pain  Tylenol and lidocaine patch.        Precautions:           fall                                                                       Diet: regular    DVT Prophylaxis:      TEDs, Lovenox                                                               Medical Prognosis: guarded 58 yo female s/p suspected CVA vs. encephalitis admitted to rehab with left-sided weakness and functional deficits.     #r/o CVA  ASA continues c GI ppx, Lovenox SQ. Tolerating well.     #Leukocytosis  afebrile, improved, ID follow up. Patient refused CXR as part of septic workup. Stool culture/foul stool will defer to ID.     #tachycardia  -metoprolol BID, controlled HR, monitor closely    #ILD  -pulmicort, spiriva  -on home O2 PRN.     #Seizure  Continue Depakote, seizure ppx    #rectal pain/hx of bleed  GI evaluation of nausea and rectal pain, bowel regimen for constipation. Spoke c Dr Singh-ok to start DVT ppx c Lovenox. Amitiza started.     #back pain  Tylenol and lidocaine patch.    #insomnia, anxiety, depression  psychiatry eval requested        Precautions:           fall                                                                       Diet: regular    DVT Prophylaxis:      TEDs, Lovenox                                                               Medical Prognosis: guarded

## 2019-05-13 DIAGNOSIS — K62.89 OTHER SPECIFIED DISEASES OF ANUS AND RECTUM: ICD-10-CM

## 2019-05-13 DIAGNOSIS — G93.9 DISORDER OF BRAIN, UNSPECIFIED: ICD-10-CM

## 2019-05-13 LAB
ALBUMIN SERPL ELPH-MCNC: 2.4 G/DL — LOW (ref 3.3–5)
ALP SERPL-CCNC: 84 U/L — SIGNIFICANT CHANGE UP (ref 40–120)
ALT FLD-CCNC: 13 U/L DA — SIGNIFICANT CHANGE UP (ref 10–45)
ANION GAP SERPL CALC-SCNC: 7 MMOL/L — SIGNIFICANT CHANGE UP (ref 5–17)
AST SERPL-CCNC: 13 U/L — SIGNIFICANT CHANGE UP (ref 10–40)
BILIRUB SERPL-MCNC: 0.2 MG/DL — SIGNIFICANT CHANGE UP (ref 0.2–1.2)
BUN SERPL-MCNC: 20 MG/DL — SIGNIFICANT CHANGE UP (ref 7–23)
CALCIUM SERPL-MCNC: 9.5 MG/DL — SIGNIFICANT CHANGE UP (ref 8.4–10.5)
CHLORIDE SERPL-SCNC: 102 MMOL/L — SIGNIFICANT CHANGE UP (ref 96–108)
CO2 SERPL-SCNC: 30 MMOL/L — SIGNIFICANT CHANGE UP (ref 22–31)
CREAT SERPL-MCNC: 0.66 MG/DL — SIGNIFICANT CHANGE UP (ref 0.5–1.3)
GLUCOSE SERPL-MCNC: 83 MG/DL — SIGNIFICANT CHANGE UP (ref 70–99)
HCT VFR BLD CALC: 33.5 % — LOW (ref 34.5–45)
HGB BLD-MCNC: 11 G/DL — LOW (ref 11.5–15.5)
MCHC RBC-ENTMCNC: 30.8 PG — SIGNIFICANT CHANGE UP (ref 27–34)
MCHC RBC-ENTMCNC: 32.8 GM/DL — SIGNIFICANT CHANGE UP (ref 32–36)
MCV RBC AUTO: 94 FL — SIGNIFICANT CHANGE UP (ref 80–100)
PLATELET # BLD AUTO: 461 K/UL — HIGH (ref 150–400)
POTASSIUM SERPL-MCNC: 3.1 MMOL/L — LOW (ref 3.5–5.3)
POTASSIUM SERPL-SCNC: 3.1 MMOL/L — LOW (ref 3.5–5.3)
PROT SERPL-MCNC: 6.2 G/DL — SIGNIFICANT CHANGE UP (ref 6–8.3)
RBC # BLD: 3.56 M/UL — LOW (ref 3.8–5.2)
RBC # FLD: 14.3 % — SIGNIFICANT CHANGE UP (ref 10.3–14.5)
SODIUM SERPL-SCNC: 139 MMOL/L — SIGNIFICANT CHANGE UP (ref 135–145)
WBC # BLD: 14.5 K/UL — HIGH (ref 3.8–10.5)
WBC # FLD AUTO: 14.5 K/UL — HIGH (ref 3.8–10.5)

## 2019-05-13 PROCEDURE — 99233 SBSQ HOSP IP/OBS HIGH 50: CPT

## 2019-05-13 RX ORDER — POTASSIUM CHLORIDE 20 MEQ
40 PACKET (EA) ORAL EVERY 4 HOURS
Refills: 0 | Status: COMPLETED | OUTPATIENT
Start: 2019-05-13 | End: 2019-05-13

## 2019-05-13 RX ORDER — ONDANSETRON 8 MG/1
4 TABLET, FILM COATED ORAL EVERY 6 HOURS
Refills: 0 | Status: DISCONTINUED | OUTPATIENT
Start: 2019-05-13 | End: 2019-06-06

## 2019-05-13 RX ORDER — POTASSIUM CHLORIDE 20 MEQ
40 PACKET (EA) ORAL ONCE
Refills: 0 | Status: COMPLETED | OUTPATIENT
Start: 2019-05-13 | End: 2019-05-14

## 2019-05-13 RX ADMIN — PREGABALIN 1000 MICROGRAM(S): 225 CAPSULE ORAL at 12:24

## 2019-05-13 RX ADMIN — BUDESONIDE AND FORMOTEROL FUMARATE DIHYDRATE 2 PUFF(S): 160; 4.5 AEROSOL RESPIRATORY (INHALATION) at 20:05

## 2019-05-13 RX ADMIN — LUBIPROSTONE 8 MICROGRAM(S): 24 CAPSULE, GELATIN COATED ORAL at 07:22

## 2019-05-13 RX ADMIN — Medication 40 MILLIEQUIVALENT(S): at 17:07

## 2019-05-13 RX ADMIN — TIOTROPIUM BROMIDE 1 CAPSULE(S): 18 CAPSULE ORAL; RESPIRATORY (INHALATION) at 08:00

## 2019-05-13 RX ADMIN — ENOXAPARIN SODIUM 40 MILLIGRAM(S): 100 INJECTION SUBCUTANEOUS at 12:24

## 2019-05-13 RX ADMIN — ONDANSETRON 4 MILLIGRAM(S): 8 TABLET, FILM COATED ORAL at 07:22

## 2019-05-13 RX ADMIN — Medication 250 MILLIGRAM(S): at 17:05

## 2019-05-13 RX ADMIN — PANTOPRAZOLE SODIUM 40 MILLIGRAM(S): 20 TABLET, DELAYED RELEASE ORAL at 05:31

## 2019-05-13 RX ADMIN — Medication 250 MILLIGRAM(S): at 05:30

## 2019-05-13 RX ADMIN — LUBIPROSTONE 8 MICROGRAM(S): 24 CAPSULE, GELATIN COATED ORAL at 17:05

## 2019-05-13 RX ADMIN — Medication 40 MILLIEQUIVALENT(S): at 12:24

## 2019-05-13 RX ADMIN — Medication 81 MILLIGRAM(S): at 12:24

## 2019-05-13 RX ADMIN — Medication 25 MILLIGRAM(S): at 17:07

## 2019-05-13 RX ADMIN — BUDESONIDE AND FORMOTEROL FUMARATE DIHYDRATE 2 PUFF(S): 160; 4.5 AEROSOL RESPIRATORY (INHALATION) at 08:02

## 2019-05-13 RX ADMIN — Medication 650 MILLIGRAM(S): at 06:00

## 2019-05-13 RX ADMIN — Medication 650 MILLIGRAM(S): at 05:31

## 2019-05-13 RX ADMIN — ONDANSETRON 4 MILLIGRAM(S): 8 TABLET, FILM COATED ORAL at 17:05

## 2019-05-13 NOTE — PROGRESS NOTE ADULT - SUBJECTIVE AND OBJECTIVE BOX
CHIEF COMPLAINT: Fragile refusing therapy periodically, reports rectal pain, 3-4 BMs  over  the weekend on Amitiza. Admits to being anxious and tearful, reports poor sleep      HISTORY OF PRESENT ILLNESS    58 yo female with PMHx of MVP, chronic SDH, interstitial lung disease/pneumothorax, pulmonary nodules, meniere's, non hodgkins lymphoma (SCD/XRT/chemo at [facility] 2003), TIA, tachycardia, occipital neuralgia, and neuropathy.  Patient was initially admitted to [facility] on 3/13/19 with acute onset of b/l UE weakness associated with nausea, blurry vision, and HA. CT head showed chronic B/L frontal SDH. The rest of the workup was negative. CVA/TIA r/out.  Pt was discharged home but on 3/17 had apparent seizure.  She was admitted to [facility] and intubated and placed on depakote.  CTA H/N, MRI, EEG unremarkable.  ID Recommended LP for fever; family deferred.  Transferred [facility] on 3/19[facility].  MRI brain showed cerebral ischemia. The rest of the workup was negative, this including CSF studies. EEG neg seizures. (Evaluated by lymphoma specialist/onco, PET 4/5/19 r/o recurrence of lymphoma. Increased signal at base of tongue to follow up outpt ENT). Additionally, course complicated with pneumothorax, pulmonary consulted, no interventions, self resolved.  Tachycardia - per pulm, related related to chronic lung disease.  GI consulted for rectal pain; diagnosed c anal fissure/constipation, bowel regimen started. KUB 4/12/19 No stool, no free air, no distended loops of small bowel, fibroids. Refused further workup.   Other issues while admitted:  Hypotension - home diltiazem held per cardio  dizziness - sporadic, self resolving.  Pt deferred MRI.  May be 2/2 to hx of Meniere's disease.      *TTE 3/18/19 EF 60%, mod-severe MR.   MRI brain 3/29/19 cortically based restricted diffusion within R>L frontoparietal region as well as additional scattered small foci, may represent evolution of a recent ischemia vs improving encephalitis. Patient medically optimized and cleared for discharge to acute rehab at [facility] on 4/19/19.   At [facility] patient presented with rectal pain, fluctuating tachycardia 110-150 at rest and orthostatic hypotension. GI, hematology, cardiology and pulmonary evaluation requested. Rectal bleeding developed in the morning of 4/21/19 with severe hypotension and tachycardia. RRT called. Patient transferred to ICU. Patient's course in ICU reviewed and discussed with staff. Investigations, current lab results and recent treatments also reviewed and discussed. Patient was attempted to be evaluated by PT twice but unable to tolerate evaluation due to severe tachycardia up to 150 at rest and symptomatic orthostatic hypotension as low as 80s. Patient is preparing for colonoscopy today to evaluate the source of rectal bleed. S/p multiple PRBC transfusions. Presently on IV antibiotics and IV steroids. (09 May 2019 12:11)        PAST MEDICAL & SURGICAL HISTORY:  Interstitial lung disease: on home o2 prn  NHL (non-Hodgkin's lymphoma): Agem 43 sp chemo/rt/stem cell  Transient cerebral ischemia, unspecified type  Mitral prolapse  History of tonsillectomy  History of appendectomy      VITALS  Vital Signs Last 24 Hrs  T(C): 36.8 (13 May 2019 07:23), Max: 36.8 (13 May 2019 07:23)  T(F): 98.2 (13 May 2019 07:23), Max: 98.2 (13 May 2019 07:23)  HR: 90 (13 May 2019 08:00) (90 - 100)  BP: 100/64 (13 May 2019 07:23) (94/65 - 106/72)  BP(mean): --  RR: 15 (13 May 2019 07:23) (15 - 16)  SpO2: 96% (13 May 2019 08:00) (96% - 98%)      PHYSICAL EXAM  Constitutional - NAD, Comfortable  HEENT - NCAT, EOMI  Neck - Supple, No limited ROM  Chest - CTA bilaterally,   Cardiovascular - RRR, S1S2, No murmurs  Abdomen - BS+, Soft, NTND  Extremities - No C/C/E, No calf tenderness   Skin-no rash  Neurologic Exam -  no new focal deficits                       RECENT LABS                        11.0   14.5  )-----------( 461      ( 13 May 2019 05:40 )             33.5     05-13    139  |  102  |  20  ----------------------------<  83  3.1<L>   |  30  |  0.66    Ca    9.5      13 May 2019 05:40    TPro  6.2  /  Alb  2.4<L>  /  TBili  0.2  /  DBili  x   /  AST  13  /  ALT  13  /  AlkPhos  84  05-13      LIVER FUNCTIONS - ( 13 May 2019 05:40 )  Alb: 2.4 g/dL / Pro: 6.2 g/dL / ALK PHOS: 84 U/L / ALT: 13 U/L DA / AST: 13 U/L / GGT: x                 Valproic Acid Level, Serum: 38 ug/mL (05-10-19 @ 14:31)  Valproic Acid Level, Serum: 60 ug/mL (04-22-19 @ 13:40)              CURRENT MEDICATIONS  MEDICATIONS  (STANDING):  aspirin enteric coated 81 milliGRAM(s) Oral daily  buDESOnide  80 MICROgram(s)/formoterol 4.5 MICROgram(s) Inhaler 2 Puff(s) Inhalation two times a day  cyanocobalamin 1000 MICROGram(s) Oral daily  enoxaparin Injectable 40 milliGRAM(s) SubCutaneous daily  lidocaine   Patch 1 Patch Transdermal <User Schedule>  lubiprostone 8 MICROGram(s) Oral two times a day  metoprolol tartrate 25 milliGRAM(s) Oral two times a day  pantoprazole    Tablet 40 milliGRAM(s) Oral before breakfast  potassium chloride    Tablet ER 40 milliEquivalent(s) Oral every 4 hours  tiotropium 18 MICROgram(s) Capsule 1 Capsule(s) Inhalation daily  valproic acid 250 milliGRAM(s) Oral every 12 hours  zinc oxide 20% Ointment 1 Application(s) Topical daily    MEDICATIONS  (PRN):  acetaminophen   Tablet .. 650 milliGRAM(s) Oral every 6 hours PRN Moderate Pain (4 - 6)  ALPRAZolam 0.25 milliGRAM(s) Oral three times a day PRN anxiety  docusate sodium 100 milliGRAM(s) Oral daily PRN Constipation  melatonin Oral Tab/Cap - Peds 3 milliGRAM(s) Oral at bedtime PRN Insomnia

## 2019-05-13 NOTE — PROGRESS NOTE ADULT - ATTENDING COMMENTS
Multidisciplinary team meeting today:  patient's functional goals and needs, functional and clinical  progress were discussed, barriers to discharge were identified.  Will offer 5-7 days trial , if cant tolerate anticipate   [facility] facility placement. Multidisciplinary team meeting today:  patient's functional goals and needs, functional and clinical  progress were discussed, barriers to discharge were identified.  Will offer 5-7 days trial , if cant tolerate anticipate   [facility] facility placement.    Attempted to return a call for Dr. Marcie Townsend [phone], message left

## 2019-05-13 NOTE — PROGRESS NOTE ADULT - ASSESSMENT
60 y/o female with multiple medical problems. Rectal exam done at bedside this morning, hemorrhoids. Patient states she will take amitiza, as per nurse she refused it.

## 2019-05-13 NOTE — PROGRESS NOTE ADULT - SUBJECTIVE AND OBJECTIVE BOX
INTERVAL HPI/OVERNIGHT EVENTS:  HPI:  58 y/o female PMHx of MVP, chronic SDH, interstitial lung disease/pneumothorax, pulmonary nodules, meniere's, non hodgkins lymphoma (SCD/XRT/chemo at [facility] 2003), TIA, tachycardia, occipital neuralgia, and neuropathy. Patient seen and examined on rehab this morning. Denies nausea, vomiting. No further episodes of GI bleeding noted. +BM yesterday, somewhat loose component. Patient states she has some intermittent rectal pain and constipation.       MEDICATIONS  (STANDING):  aspirin enteric coated 81 milliGRAM(s) Oral daily  buDESOnide  80 MICROgram(s)/formoterol 4.5 MICROgram(s) Inhaler 2 Puff(s) Inhalation two times a day  cyanocobalamin 1000 MICROGram(s) Oral daily  enoxaparin Injectable 40 milliGRAM(s) SubCutaneous daily  lidocaine   Patch 1 Patch Transdermal <User Schedule>  lubiprostone 8 MICROGram(s) Oral two times a day  metoprolol tartrate 25 milliGRAM(s) Oral two times a day  pantoprazole    Tablet 40 milliGRAM(s) Oral before breakfast  potassium chloride    Tablet ER 40 milliEquivalent(s) Oral every 4 hours  tiotropium 18 MICROgram(s) Capsule 1 Capsule(s) Inhalation daily  valproic acid 250 milliGRAM(s) Oral every 12 hours  zinc oxide 20% Ointment 1 Application(s) Topical daily    MEDICATIONS  (PRN):  acetaminophen   Tablet .. 650 milliGRAM(s) Oral every 6 hours PRN Moderate Pain (4 - 6)  ALPRAZolam 0.25 milliGRAM(s) Oral three times a day PRN anxiety  docusate sodium 100 milliGRAM(s) Oral daily PRN Constipation  melatonin Oral Tab/Cap - Peds 3 milliGRAM(s) Oral at bedtime PRN Insomnia      Allergies    IV Contrast (Anaphylaxis)  shellfish. (Anaphylaxis)    Intolerances        PHYSICAL EXAM:   Vital Signs:  Vital Signs Last 24 Hrs  T(C): 36.8 (13 May 2019 07:23), Max: 36.8 (13 May 2019 07:23)  T(F): 98.2 (13 May 2019 07:23), Max: 98.2 (13 May 2019 07:23)  HR: 90 (13 May 2019 08:00) (90 - 100)  BP: 100/64 (13 May 2019 07:23) (94/65 - 106/72)  BP(mean): --  RR: 15 (13 May 2019 07:23) (15 - 16)  SpO2: 96% (13 May 2019 08:00) (96% - 98%)  Daily     Daily I&O's Summary    12 May 2019 07:01  -  13 May 2019 07:00  --------------------------------------------------------  IN: 300 mL / OUT: 0 mL / NET: 300 mL    GENERAL:  Appears stated age,  HEENT:  NC/AT,  conjunctivae clear and pink  CHEST:  Full & symmetric excursion, no increased effort, breath sounds clear  HEART:  Regular rhythm, S1, S2  ABDOMEN:  Soft, non-tender, non-distended, normoactive bowel sounds, rectal exam + hemorrhoids   EXTEREMITIES:  no edema, L side weakness, +pulses   SKIN:  No rash, warm/dry  NEURO:  Alert, oriented,      LABS:                        11.0   14.5  )-----------( 461      ( 13 May 2019 05:40 )             33.5     05-13    139  |  102  |  20  ----------------------------<  83  3.1<L>   |  30  |  0.66    Ca    9.5      13 May 2019 05:40    TPro  6.2  /  Alb  2.4<L>  /  TBili  0.2  /  DBili  x   /  AST  13  /  ALT  13  /  AlkPhos  84  05-13        amylase   lipase  RADIOLOGY & ADDITIONAL TESTS:

## 2019-05-13 NOTE — PROGRESS NOTE ADULT - ASSESSMENT
58 yo F, remote lymphoma, pulm nodules, ILD, chronic b/l SDH, prolonged hosp since March for neuro decompensation- new onset Sz, ?cerebral ischemia vs air embolism, as well tachycardia    She was briefly at rehab, only to require admission for rectal bleed- mult Units PRBCs, ICU monitoring, pressors- covered for sepsis    CT with basilar infiltrates, changes on CXR, might question pneumonia, but difficult to distinguish from chronic, underlying lung dz  Bld Cxs negative   Completed course Vanco and zosyn  Afebrile, alert, WBC improved, PCT lower, hemodynamically stable, H/H stable, sent back to Rehab   Remains afebrile, but ID reconsulted on 5/10/19 with rise in WBC  No other new findings;   PCT level normal  Leukocytosis is self moderating  Doubt emerging infection    Plan:  Observe off antibiotics.   Follow temps and CBC/diff   Repeat cx if develops fevers

## 2019-05-13 NOTE — PROGRESS NOTE ADULT - SUBJECTIVE AND OBJECTIVE BOX
Patient is a 59 year old female who presents with a chief complaint of debility/functional deficits (09 May 2019 12:11)    HPI: 59 year old female with history of MVP, chronic SDH, interstitial lung disease/pneumothorax, pulmonary nodules, Meniere's disease, non hodgkins lymphoma (SCD/XRT/chemo at [facility] 2003), TIA, tachycardia, occipital neuralgia, and neuropathy.  Patient was initially admitted to [facility] on 3/13/2019 with acute onset of bilateral UE weakness associated with nausea, blurry vision, and HA. CT head showed chronic bilateral frontal SDH. TTE 3/18/2019 showed EF 60%, mod-severe MR.   The rest of the workup was negative. CVA/TIA ruled - out.  Patient was discharged home but on 3/17/2019, had apparent seizure.  She was admitted to [facility] and intubated and placed on depakote.  CTA H/N, MRI, EEG unremarkable.  ID Recommended LP for fever; family deferred.  Transferred to [facility] on 3/19/2019.  MRI brain showed cerebral ischemia. The rest of the workup was negative, this including CSF studies. EEG neg seizures. (Evaluated by lymphoma specialist/onco, PET 4/5/2019 r/o recurrence of lymphoma. Increased signal at base of tongue to follow up outpt ENT). Additionally, course complicated with pneumothorax, pulmonary consulted, no interventions, self resolved.  Tachycardia - per pulm, related to chronic lung disease.  GI consulted for rectal pain; diagnosed with anal fissure/constipation, bowel regimen started. KUB 4/12/2019 showed no stool, no free air, no distended loops of small bowel, fibroids. Refused further workup.   Other issues while admitted:  Hypotension - home diltiazem held per cardiology  dizziness - sporadic, self resolving.  Pt deferred MRI.  May be secondary to history of Meniere's disease.      MRI brain 3/29/2019 cortically based restricted diffusion within R>L frontoparietal region as well as additional scattered small foci, may represent evolution of a recent ischemia vs improving encephalitis. Patient medically optimized and cleared for discharge to acute rehab at [facility] on 4/19/2019.   At [facility] patient presented with rectal pain, fluctuating tachycardia 110-150 at rest and orthostatic hypotension. GI, hematology, cardiology and pulmonary evaluation requested. Rectal bleeding developed in the morning of 4/21/2019 with severe hypotension and tachycardia. RRT was called. Patient transferred to ICU. Patient's course in ICU reviewed and discussed with staff. Investigations, current lab results and recent treatments also reviewed and discussed. Patient was attempted to be evaluated by PT twice but unable to tolerate evaluation due to severe tachycardia up to 150 at rest and symptomatic orthostatic hypotension as low as 80s. Patient underwent EGD and colonoscopy to evaluate the source of rectal bleed. S/p multiple PRBC transfusions. EGD and Colonoscopy were negative. Patient seen and examined reports constipation and rectal pain. Now back on BIU.     S: this morning reports poor sleep and weakness and tremors. Denies headache rectal pain or bleeding.     O: Vital Signs Last 24 Hrs  T(C): 36.8 (13 May 2019 07:23), Max: 36.8 (13 May 2019 07:23)  T(F): 98.2 (13 May 2019 07:23), Max: 98.2 (13 May 2019 07:23)  HR: 90 (13 May 2019 08:00) (90 - 100)  BP: 100/64 (13 May 2019 07:23) (94/65 - 106/72)  BP(mean): --  RR: 15 (13 May 2019 07:23) (15 - 16)  SpO2: 96% (13 May 2019 08:00) (96% - 98%)    GENERAL: NAD,   HEAD:  Atraumatic, Normocephalic  EYES: EOMI, PERRL, conjunctiva and sclera clear  ENMT:  Moist mucous membranes, Good dentition, No lesions  NECK: Supple, No JVD, Normal thyroid  NERVOUS SYSTEM:  Alert & Oriented X3, has LUE weakness> RUE, mild LLE weakness, impaired sensation LUE and LLE CHEST/LUNG: Clear to ascultation bilaterally; No rales, rhonchi, wheezing, or rubs. mild tremor on outstretched hands. Reflexes +2 patella  HEART: Regular rate and rhythm; has murmurs, no rubs, or gallops  ABDOMEN: Soft, Nontender, Nondistended; Bowel sounds present  EXTREMITIES:  2+ Peripheral Pulses, No clubbing, cyanosis, or edema  SKIN: No rash  PSYCH - emotional, depressed, anxious                          11.0   14.5  )-----------( 461      ( 13 May 2019 05:40 )             33.5     13 May 2019 05:40    139    |  102    |  20     ----------------------------<  83     3.1     |  30     |  0.66     Ca    9.5        13 May 2019 05:40    TPro  6.2    /  Alb  2.4    /  TBili  0.2    /  DBili  x      /  AST  13     /  ALT  13     /  AlkPhos  84     13 May 2019 05:40    LIVER FUNCTIONS - ( 13 May 2019 05:40 )  Alb: 2.4 g/dL / Pro: 6.2 g/dL / ALK PHOS: 84 U/L / ALT: 13 U/L DA / AST: 13 U/L / GGT: x             CAPILLARY BLOOD GLUCOSE    MEDICATIONS  (STANDING):  aspirin enteric coated 81 milliGRAM(s) Oral daily  buDESOnide  80 MICROgram(s)/formoterol 4.5 MICROgram(s) Inhaler 2 Puff(s) Inhalation two times a day  cyanocobalamin 1000 MICROGram(s) Oral daily  enoxaparin Injectable 40 milliGRAM(s) SubCutaneous daily  lidocaine   Patch 1 Patch Transdermal <User Schedule>  lubiprostone 8 MICROGram(s) Oral two times a day  metoprolol tartrate 25 milliGRAM(s) Oral two times a day  pantoprazole    Tablet 40 milliGRAM(s) Oral before breakfast  potassium chloride    Tablet ER 40 milliEquivalent(s) Oral every 4 hours  tiotropium 18 MICROgram(s) Capsule 1 Capsule(s) Inhalation daily  valproic acid 250 milliGRAM(s) Oral every 12 hours  zinc oxide 20% Ointment 1 Application(s) Topical daily    MEDICATIONS  (PRN):  acetaminophen   Tablet .. 650 milliGRAM(s) Oral every 6 hours PRN Moderate Pain (4 - 6)  ALPRAZolam 0.25 milliGRAM(s) Oral three times a day PRN anxiety  docusate sodium 100 milliGRAM(s) Oral daily PRN Constipation  melatonin Oral Tab/Cap - Peds 3 milliGRAM(s) Oral at bedtime PRN Insomnia

## 2019-05-13 NOTE — PROGRESS NOTE ADULT - ATTENDING COMMENTS
Patient seen and examined.  Agree with assessment and plan.  Continues to have constipation.  Amitiza has been prescribed, but unsure whether patient is actually taking the medication.    VSS.  Abdomen soft, nontender    Rectal discomfort likely due to constipation and stool burden.  Advised to take Amitiza.

## 2019-05-13 NOTE — PROGRESS NOTE ADULT - ASSESSMENT
59 year old female s/p suspected CVA vs. encephalitis admitted to rehab with left-sided weakness and functional deficits.     # CVA  continue ASA  GI ppx  per neuro/PMR    #Tachycardia  continue metoprolol BID, monitor closely    #hypokalemia-replete, trend     #ILD  pulmicort, xopenex, spiriva  on home O2 PRN.     #Seizure  Continue Depakote, seizure ppx, f/u neurology    #constipation/ rectal pain  GI evaluation of nausea and rectal pain, bowel regimen for constipation    #back pain  Tylenol and lidocaine patch.     #Vitamin B12 deficiency  patient reports she is vegetarian  continue cyanocobalamin 1000 MICROGram(s) Oral daily    # GERD  continue pantoprazole     #DVT ppx  SCDs

## 2019-05-13 NOTE — PROGRESS NOTE ADULT - ASSESSMENT
58 yo RHWF female with complex medical history  s/p suspected multiple  CVAs vs. encephalitis   being readmitted to acute rehabilitation  unit  with spastic quadriparesis, more pronounced in left UE, sensory impairment, gait impairment   and functional deficits.        #r/o CVA  ASA continues c GI ppx    #Leukocytosis  -improving  -afebrile, ID and Medicine are following.  -will continue to monitor off ABx    #tachycardia  -metoprolol BID, monitor closely    #ILD  -pulmicort, spiriva  -on home O2 PRN.     #Seizure  Continue Depakote, seizure ppx    #rectal pain/hx of bleed  GI evaluation  is following, case discussed, will follow recommendations    # anxiety  -Xanax as needed  -Psychiatry evaluation    #debility  Lovenox SQ cleared by GI, Nutrition eval.

## 2019-05-14 LAB
ANION GAP SERPL CALC-SCNC: 7 MMOL/L — SIGNIFICANT CHANGE UP (ref 5–17)
BUN SERPL-MCNC: 16 MG/DL — SIGNIFICANT CHANGE UP (ref 7–23)
CALCIUM SERPL-MCNC: 9.2 MG/DL — SIGNIFICANT CHANGE UP (ref 8.4–10.5)
CHLORIDE SERPL-SCNC: 104 MMOL/L — SIGNIFICANT CHANGE UP (ref 96–108)
CO2 SERPL-SCNC: 29 MMOL/L — SIGNIFICANT CHANGE UP (ref 22–31)
CREAT SERPL-MCNC: 0.65 MG/DL — SIGNIFICANT CHANGE UP (ref 0.5–1.3)
GLUCOSE SERPL-MCNC: 92 MG/DL — SIGNIFICANT CHANGE UP (ref 70–99)
POTASSIUM SERPL-MCNC: 3.3 MMOL/L — LOW (ref 3.5–5.3)
POTASSIUM SERPL-SCNC: 3.3 MMOL/L — LOW (ref 3.5–5.3)
SODIUM SERPL-SCNC: 140 MMOL/L — SIGNIFICANT CHANGE UP (ref 135–145)

## 2019-05-14 PROCEDURE — 99233 SBSQ HOSP IP/OBS HIGH 50: CPT

## 2019-05-14 PROCEDURE — 93010 ELECTROCARDIOGRAM REPORT: CPT

## 2019-05-14 RX ORDER — POTASSIUM CHLORIDE 20 MEQ
40 PACKET (EA) ORAL EVERY 4 HOURS
Refills: 0 | Status: COMPLETED | OUTPATIENT
Start: 2019-05-14 | End: 2019-05-14

## 2019-05-14 RX ORDER — MAGNESIUM SULFATE 500 MG/ML
1 VIAL (ML) INJECTION ONCE
Refills: 0 | Status: COMPLETED | OUTPATIENT
Start: 2019-05-14 | End: 2019-05-14

## 2019-05-14 RX ORDER — ALPRAZOLAM 0.25 MG
0.25 TABLET ORAL THREE TIMES A DAY
Refills: 0 | Status: DISCONTINUED | OUTPATIENT
Start: 2019-05-14 | End: 2019-05-14

## 2019-05-14 RX ADMIN — BUDESONIDE AND FORMOTEROL FUMARATE DIHYDRATE 2 PUFF(S): 160; 4.5 AEROSOL RESPIRATORY (INHALATION) at 21:25

## 2019-05-14 RX ADMIN — Medication 40 MILLIEQUIVALENT(S): at 18:04

## 2019-05-14 RX ADMIN — PREGABALIN 1000 MICROGRAM(S): 225 CAPSULE ORAL at 11:54

## 2019-05-14 RX ADMIN — Medication 40 MILLIEQUIVALENT(S): at 09:53

## 2019-05-14 RX ADMIN — ONDANSETRON 4 MILLIGRAM(S): 8 TABLET, FILM COATED ORAL at 10:05

## 2019-05-14 RX ADMIN — Medication 250 MILLIGRAM(S): at 17:38

## 2019-05-14 RX ADMIN — LUBIPROSTONE 8 MICROGRAM(S): 24 CAPSULE, GELATIN COATED ORAL at 08:42

## 2019-05-14 RX ADMIN — Medication 650 MILLIGRAM(S): at 00:50

## 2019-05-14 RX ADMIN — Medication 100 GRAM(S): at 16:05

## 2019-05-14 RX ADMIN — ZINC OXIDE 1 APPLICATION(S): 200 OINTMENT TOPICAL at 11:58

## 2019-05-14 RX ADMIN — Medication 650 MILLIGRAM(S): at 11:01

## 2019-05-14 RX ADMIN — PANTOPRAZOLE SODIUM 40 MILLIGRAM(S): 20 TABLET, DELAYED RELEASE ORAL at 06:39

## 2019-05-14 RX ADMIN — ENOXAPARIN SODIUM 40 MILLIGRAM(S): 100 INJECTION SUBCUTANEOUS at 11:55

## 2019-05-14 RX ADMIN — Medication 650 MILLIGRAM(S): at 10:05

## 2019-05-14 RX ADMIN — Medication 40 MILLIEQUIVALENT(S): at 16:09

## 2019-05-14 RX ADMIN — Medication 250 MILLIGRAM(S): at 06:39

## 2019-05-14 RX ADMIN — Medication 650 MILLIGRAM(S): at 01:15

## 2019-05-14 NOTE — PROGRESS NOTE ADULT - ATTENDING COMMENTS
No acute events overnight, unchanged neurological exam.  Spoke with patient's Pulmonologist Dr. Bettye Townsend, case discussed in detail. She will fax additional medical documentation, notes, tests results.  Patient has extensive progressive pulmonary pathology of unclear etiology. Not a lung transplant candidate. She was extensively investigate by Neurology, Rheumatology, Cardiology , Oncology and Medicine in the past. No clear explanation for her current neurological disease found.    Will continue full program for now, discharge planning discussed with .  Medicine input appreciated.

## 2019-05-14 NOTE — PROVIDER CONTACT NOTE (MEDICATION) - ASSESSMENT
patient crying "my stomach hurts ...I don't think I can do therapy"   RN offered encouragement   Offered tylenol and xanax. Patient refused  Patient also refused baby aspirin
importance of medication discussed with patient

## 2019-05-14 NOTE — PROGRESS NOTE ADULT - ASSESSMENT
60 yo RHWF female with complex medical history  s/p suspected multiple  CVAs vs. encephalitis   being readmitted to acute rehabilitation  unit  with spastic quadriparesis, more pronounced in left UE, sensory impairment, gait impairment   and functional deficits.      #r/o CVA  ASA continues c GI ppx    #Leukocytosis  -improving  -afebrile, ID and Medicine are following.  -will continue to monitor off ABx    #tachycardia  -metoprolol BID    #ILD  -pulmicort, spiriva, pulmonary evaluation placed  to Dr Maharaj.   -on home O2 PRN.     #Seizure  Continue Depakote, seizure ppx    #rectal pain/hx of bleed  GI is following, case discussed, will follow recommendations, Amitiza continues.    # anxiety  -Xanax as needed  -Psychiatry evaluation    #debility  Lovenox SQ cleared by GI, Nutrition eval.

## 2019-05-14 NOTE — PROGRESS NOTE ADULT - SUBJECTIVE AND OBJECTIVE BOX
Patient states she slept poorly overnight due to anxiety.  Tolerating PO, voiding and continent of urine.  BM documented yesterday.  Denies SOB, pain.    Vital Signs Last 24 Hrs  T(C): 37.1 (13 May 2019 21:45), Max: 37.1 (13 May 2019 21:45)  T(F): 98.8 (13 May 2019 21:45), Max: 98.8 (13 May 2019 21:45)  HR: 83 (14 May 2019 06:38) (83 - 101)  BP: 104/62 (14 May 2019 06:38) (102/65 - 104/62)  RR: 15 (14 May 2019 06:38) (15 - 15)  SpO2: 97% RA (14 May 2019 06:38) (94% - 98%)    Labs  140  |  104  |  16  ----------------------------<  92  3.3<L>   |  29  |  0.65    Ca    9.2      14 May 2019 05:30  TPro  6.2  /  Alb  2.4<L>  /  TBili  0.2  /  DBili  x   /  AST  13  /  ALT  13  /  AlkPhos  84  05-13      MEDICATIONS  (STANDING):  aspirin enteric coated 81 milliGRAM(s) Oral daily  buDESOnide  80 MICROgram(s)/formoterol 4.5 MICROgram(s) Inhaler 2 Puff(s) Inhalation two times a day  cyanocobalamin 1000 MICROGram(s) Oral daily  enoxaparin Injectable 40 milliGRAM(s) SubCutaneous daily  lidocaine   Patch 1 Patch Transdermal <User Schedule>  lubiprostone 8 MICROGram(s) Oral two times a day  metoprolol tartrate 25 milliGRAM(s) Oral two times a day  pantoprazole    Tablet 40 milliGRAM(s) Oral before breakfast  potassium chloride    Tablet ER 40 milliEquivalent(s) Oral once  tiotropium 18 MICROgram(s) Capsule 1 Capsule(s) Inhalation daily  valproic acid 250 milliGRAM(s) Oral every 12 hours  zinc oxide 20% Ointment 1 Application(s) Topical daily  acetaminophen   Tablet .. 650 milliGRAM(s) Oral every 6 hours PRN Moderate Pain (4 - 6)  ALPRAZolam 0.25 milliGRAM(s) Oral three times a day PRN anxiety  docusate sodium 100 milliGRAM(s) Oral daily PRN Constipation  melatonin Oral Tab/Cap - Peds 3 milliGRAM(s) Oral at bedtime PRN Insomnia  ondansetron    Tablet 4 milliGRAM(s) Oral every 6 hours PRN Nausea      Physical Exam  Gen:  WN/WD Female resting in bed, NAD  ENT:  NC/AT, no JVD noted  Thorax:  Symmetric, no retractions  Lung:  CTA b/l  CV:  S1, S2. RRR  Abd:  Soft, NT/ND.  BS normoactive, no masses to palp  Extrem:  No C/C/E, DP/radial pulses +2  Neuro:  A&O x 3, strength 0/5 LUE otherwise 5/5 throughout

## 2019-05-14 NOTE — PROGRESS NOTE ADULT - ASSESSMENT
59 year old female s/p suspected CVA vs. encephalitis admitted to rehab with left-sided weakness and functional deficits.     # CVA  continue ASA  continue PMR as tolerated    #Tachycardia  Continue metoprolol  Monitor BP and HR    #Acute hypokalemia  Continue oral supplementation  Monitor     #Interstitial lung disease  Home O2 dependent   Continue bronchodialators: pulmicort, xopenex, spiriva    #Seizure  Continue Depakote  Monitor for siezure    #Constipation  Bowel regimen  Appreciate GI follow up    #back pain  Lidocaine TD  Tylenol PRN    #Vitamin B12 deficiency  Continue cyanocobalamin 1000 MICROGram(s) Oral daily    # GERD  Continue pantoprazole     #DVT ppx  SCDs  Lovenox 40sq QD 59 year old female s/p suspected CVA vs. encephalitis  admitted to rehab with left-sided weakness and functional deficits.     # CVA  continue ASA  continue PMR as tolerated    #Anxiety  Continue Xanax PRN  Await Psych evaluation    #Tachycardia  Continue metoprolol  Monitor BP and HR    #Acute hypokalemia  Continue oral supplementation  Monitor     #Interstitial lung disease  Home O2 dependent   Continue bronchodialators: pulmicort, xopenex, spiriva    #Seizure  Continue Depakote  Monitor for siezure    #Constipation  Bowel regimen  Appreciate GI follow up    #back pain  Lidocaine TD  Tylenol PRN    #Vitamin B12 deficiency  Continue cyanocobalamin 1000 MICROGram(s) Oral daily    # GERD  Continue pantoprazole     #DVT ppx  SCDs  Lovenox 40sq QD

## 2019-05-14 NOTE — PROGRESS NOTE ADULT - SUBJECTIVE AND OBJECTIVE BOX
CHIEF COMPLAINT: Abdominal cramping.      HISTORY OF PRESENT ILLNESS    58 yo female with PMHx of MVP, chronic SDH, interstitial lung disease/pneumothorax, pulmonary nodules, meniere's, non hodgkins lymphoma (SCD/XRT/chemo at [facility] 2003), TIA, tachycardia, occipital neuralgia, and neuropathy.  Patient was initially admitted to [place] [facility] on 3/13/19 with acute onset of b/l UE weakness associated with nausea, blurry vision, and HA. CT head showed chronic B/L frontal SDH. The rest of the workup was negative. CVA/TIA r/out.  Pt was discharged home but on 3/17 had apparent seizure.  She was admitted to [facility] and intubated and placed on depakote.  CTA H/N, MRI, EEG unremarkable.  ID Recommended LP for fever; family deferred.  Transferred to [facility] on 3/19/19.  MRI brain showed cerebral ischemia. The rest of the workup was negative, this including CSF studies. EEG neg seizures. (Evaluated by lymphoma specialist/onco, PET 4/5/19 r/o recurrence of lymphoma. Increased signal at base of tongue to follow up outpt ENT). Additionally, course complicated with pneumothorax, pulmonary consulted, no interventions, self resolved.  Tachycardia - per pulm, related related to chronic lung disease.  GI consulted for rectal pain; diagnosed c anal fissure/constipation, bowel regimen started. KUB 4/12/19 No stool, no free air, no distended loops of small bowel, fibroids. Refused further workup.   Other issues while admitted:  Hypotension - home diltiazem held per cardio  dizziness - sporadic, self resolving.  Pt deferred MRI.  May be 2/2 to hx of Meniere's disease.      *TTE 3/18/19 EF 60%, mod-severe MR.   MRI brain 3/29/19 cortically based restricted diffusion within R>L frontoparietal region as well as additional scattered small foci, may represent evolution of a recent ischemia vs improving encephalitis. Patient medically optimized and cleared for discharge to acute rehab at [facility] on 4/19/19.   At [facility] patient presented with rectal pain, fluctuating tachycardia 110-150 at rest and orthostatic hypotension. GI, hematology, cardiology and pulmonary evaluation requested. Rectal bleeding developed in the morning of 4/21/19 with severe hypotension and tachycardia. RRT called. Patient transferred to ICU. Patient's course in ICU reviewed and discussed with staff. Investigations, current lab results and recent treatments also reviewed and discussed. Patient was attempted to be evaluated by PT twice but unable to tolerate evaluation due to severe tachycardia up to 150 at rest and symptomatic orthostatic hypotension as low as 80s. Patient is preparing for colonoscopy today to evaluate the source of rectal bleed. S/p multiple PRBC transfusions. Presently on IV antibiotics and IV steroids. (09 May 2019 12:11)      PAST MEDICAL & SURGICAL HISTORY:  Interstitial lung disease: on home o2 prn  NHL (non-Hodgkin's lymphoma): Agem 43 sp chemo/rt/stem cell  Transient cerebral ischemia, unspecified type  Mitral prolapse  History of tonsillectomy  History of appendectomy       REVIEW OF SYMPTOMS         [X]  WNL                   [X] Heme WNL              [X] Endo WNL                     [X] Skin WNL                 [X] MSK WNL                [X] Cognitive WNL         VITALS  Vital Signs Last 24 Hrs  T(C): 36.3 (14 May 2019 08:00), Max: 37.1 (13 May 2019 21:45)  T(F): 97.3 (14 May 2019 08:00), Max: 98.8 (13 May 2019 21:45)  HR: 86 (14 May 2019 08:09) (76 - 101)  BP: 98/63 (14 May 2019 08:00) (98/63 - 104/62)  BP(mean): --  RR: 14 (14 May 2019 08:00) (14 - 15)  SpO2: 97% (14 May 2019 08:09) (94% - 98%)    PHYSICAL EXAM  Constitutional - Fragile/weak  HEENT - NCAT, EOMI  Neck - Supple, No limited ROM  Chest - Diminished, No wheeze, No rhonchi, No crackles  Cardiovascular - RRR, S1S2, Tachy  Abdomen - BS+, Soft, NTND  Extremities - No C/C/E, No calf tenderness   Skin-no rash      Neurologic Exam - no new deficits on exam this am, alert and awake.                    Balance - poor     Psychiatric - depressed      FUNCTIONAL PROGRESS  Gait -   ADLs -   Transfers -  Functional transfer -     RECENT LABS                        11.0   14.5  )-----------( 461      ( 13 May 2019 05:40 )             33.5     05-14    140  |  104  |  16  ----------------------------<  92  3.3<L>   |  29  |  0.65    Ca    9.2      14 May 2019 05:30    TPro  6.2  /  Alb  2.4<L>  /  TBili  0.2  /  DBili  x   /  AST  13  /  ALT  13  /  AlkPhos  84  05-13      LIVER FUNCTIONS - ( 13 May 2019 05:40 )  Alb: 2.4 g/dL / Pro: 6.2 g/dL / ALK PHOS: 84 U/L / ALT: 13 U/L DA / AST: 13 U/L / GGT: x                 Valproic Acid Level, Serum: 38 ug/mL (05-10-19 @ 14:31)  Valproic Acid Level, Serum: 60 ug/mL (04-22-19 @ 13:40)            RADIOLOGY/OTHER RESULTS      CURRENT MEDICATIONS  MEDICATIONS  (STANDING):  aspirin enteric coated 81 milliGRAM(s) Oral daily  buDESOnide  80 MICROgram(s)/formoterol 4.5 MICROgram(s) Inhaler 2 Puff(s) Inhalation two times a day  cyanocobalamin 1000 MICROGram(s) Oral daily  enoxaparin Injectable 40 milliGRAM(s) SubCutaneous daily  lidocaine   Patch 1 Patch Transdermal <User Schedule>  lubiprostone 8 MICROGram(s) Oral two times a day  magnesium sulfate  IVPB 1 Gram(s) IV Intermittent once  metoprolol tartrate 25 milliGRAM(s) Oral two times a day  pantoprazole    Tablet 40 milliGRAM(s) Oral before breakfast  potassium chloride    Tablet ER 40 milliEquivalent(s) Oral every 4 hours  potassium chloride    Tablet ER 40 milliEquivalent(s) Oral once  tiotropium 18 MICROgram(s) Capsule 1 Capsule(s) Inhalation daily  valproic acid 250 milliGRAM(s) Oral every 12 hours  zinc oxide 20% Ointment 1 Application(s) Topical daily    MEDICATIONS  (PRN):  acetaminophen   Tablet .. 650 milliGRAM(s) Oral every 6 hours PRN Moderate Pain (4 - 6)  ALPRAZolam 0.25 milliGRAM(s) Oral three times a day PRN anxiety  docusate sodium 100 milliGRAM(s) Oral daily PRN Constipation  melatonin Oral Tab/Cap - Peds 3 milliGRAM(s) Oral at bedtime PRN Insomnia  ondansetron    Tablet 4 milliGRAM(s) Oral every 6 hours PRN Nausea      ASSESSMENT & PLAN    GI/Bowel Management - Colace/Miralax   Management - Toilet Q2  Skin - Turn Q2  Pain - Tylenol PRN/Lidocaine  DVT PPX - TEDs, add Lovenox  Diet - reg    Continue comprehensive acute rehab program consisting of 3hrs/day of OT/PT and SLP.

## 2019-05-14 NOTE — CHART NOTE - NSCHARTNOTEFT_GEN_A_CORE
Called by RN to evaluate extended dwell PIV in upper left arm.    Site swollen with no erythema, attempted to flush with NS and patient complained of acute pain in site.    Under clean conditions line undressed, d/c'd with no resistance.  Tip intact at 6cm.  Hemostasis achieved with manual pressure, dressing applied.    Patient tolerated procedure well.

## 2019-05-15 LAB
ALBUMIN SERPL ELPH-MCNC: 2.2 G/DL — LOW (ref 3.3–5)
ALP SERPL-CCNC: 83 U/L — SIGNIFICANT CHANGE UP (ref 40–120)
ALT FLD-CCNC: 13 U/L DA — SIGNIFICANT CHANGE UP (ref 10–45)
ANION GAP SERPL CALC-SCNC: 7 MMOL/L — SIGNIFICANT CHANGE UP (ref 5–17)
AST SERPL-CCNC: 14 U/L — SIGNIFICANT CHANGE UP (ref 10–40)
BILIRUB SERPL-MCNC: 0.1 MG/DL — LOW (ref 0.2–1.2)
BUN SERPL-MCNC: 14 MG/DL — SIGNIFICANT CHANGE UP (ref 7–23)
CALCIUM SERPL-MCNC: 8.9 MG/DL — SIGNIFICANT CHANGE UP (ref 8.4–10.5)
CHLORIDE SERPL-SCNC: 105 MMOL/L — SIGNIFICANT CHANGE UP (ref 96–108)
CO2 SERPL-SCNC: 28 MMOL/L — SIGNIFICANT CHANGE UP (ref 22–31)
CREAT SERPL-MCNC: 0.6 MG/DL — SIGNIFICANT CHANGE UP (ref 0.5–1.3)
CULTURE RESULTS: SIGNIFICANT CHANGE UP
GLUCOSE SERPL-MCNC: 101 MG/DL — HIGH (ref 70–99)
HCT VFR BLD CALC: 32.5 % — LOW (ref 34.5–45)
HGB BLD-MCNC: 10.2 G/DL — LOW (ref 11.5–15.5)
MAGNESIUM SERPL-MCNC: 1.7 MG/DL — SIGNIFICANT CHANGE UP (ref 1.6–2.6)
MCHC RBC-ENTMCNC: 29.1 PG — SIGNIFICANT CHANGE UP (ref 27–34)
MCHC RBC-ENTMCNC: 31.4 GM/DL — LOW (ref 32–36)
MCV RBC AUTO: 92.9 FL — SIGNIFICANT CHANGE UP (ref 80–100)
NRBC # BLD: 0 /100 WBCS — SIGNIFICANT CHANGE UP (ref 0–0)
PLATELET # BLD AUTO: 479 K/UL — HIGH (ref 150–400)
POTASSIUM SERPL-MCNC: 4.3 MMOL/L — SIGNIFICANT CHANGE UP (ref 3.5–5.3)
POTASSIUM SERPL-SCNC: 4.3 MMOL/L — SIGNIFICANT CHANGE UP (ref 3.5–5.3)
PROT SERPL-MCNC: 5.8 G/DL — LOW (ref 6–8.3)
RBC # BLD: 3.5 M/UL — LOW (ref 3.8–5.2)
RBC # FLD: 14.8 % — HIGH (ref 10.3–14.5)
SODIUM SERPL-SCNC: 140 MMOL/L — SIGNIFICANT CHANGE UP (ref 135–145)
SPECIMEN SOURCE: SIGNIFICANT CHANGE UP
WBC # BLD: 9.99 K/UL — SIGNIFICANT CHANGE UP (ref 3.8–10.5)
WBC # FLD AUTO: 9.99 K/UL — SIGNIFICANT CHANGE UP (ref 3.8–10.5)

## 2019-05-15 PROCEDURE — 99232 SBSQ HOSP IP/OBS MODERATE 35: CPT

## 2019-05-15 PROCEDURE — 99233 SBSQ HOSP IP/OBS HIGH 50: CPT

## 2019-05-15 RX ORDER — MIRTAZAPINE 45 MG/1
15 TABLET, ORALLY DISINTEGRATING ORAL AT BEDTIME
Refills: 0 | Status: DISCONTINUED | OUTPATIENT
Start: 2019-05-15 | End: 2019-05-17

## 2019-05-15 RX ADMIN — Medication 650 MILLIGRAM(S): at 16:27

## 2019-05-15 RX ADMIN — Medication 650 MILLIGRAM(S): at 23:30

## 2019-05-15 RX ADMIN — PREGABALIN 1000 MICROGRAM(S): 225 CAPSULE ORAL at 11:27

## 2019-05-15 RX ADMIN — Medication 250 MILLIGRAM(S): at 05:57

## 2019-05-15 RX ADMIN — Medication 650 MILLIGRAM(S): at 08:08

## 2019-05-15 RX ADMIN — Medication 650 MILLIGRAM(S): at 22:32

## 2019-05-15 RX ADMIN — ENOXAPARIN SODIUM 40 MILLIGRAM(S): 100 INJECTION SUBCUTANEOUS at 11:27

## 2019-05-15 RX ADMIN — Medication 650 MILLIGRAM(S): at 07:06

## 2019-05-15 RX ADMIN — TIOTROPIUM BROMIDE 1 CAPSULE(S): 18 CAPSULE ORAL; RESPIRATORY (INHALATION) at 09:41

## 2019-05-15 RX ADMIN — Medication 250 MILLIGRAM(S): at 18:52

## 2019-05-15 RX ADMIN — BUDESONIDE AND FORMOTEROL FUMARATE DIHYDRATE 2 PUFF(S): 160; 4.5 AEROSOL RESPIRATORY (INHALATION) at 09:40

## 2019-05-15 RX ADMIN — ZINC OXIDE 1 APPLICATION(S): 200 OINTMENT TOPICAL at 11:31

## 2019-05-15 RX ADMIN — Medication 650 MILLIGRAM(S): at 16:57

## 2019-05-15 RX ADMIN — PANTOPRAZOLE SODIUM 40 MILLIGRAM(S): 20 TABLET, DELAYED RELEASE ORAL at 06:01

## 2019-05-15 NOTE — CONSULT NOTE ADULT - SUBJECTIVE AND OBJECTIVE BOX
Psychiatry Consultation-Liaison Follow-up Note  59y , retired female.  Encounter:   Chart reviewed  Case Discussed with: Attending of Record.     Chief Complaint: "I'm tired."    Interval History:  59 yr old female PMHx of MVP, chronic SDH, interstitial lung disease/pneumothorax, pulmonary nodules, meniere's, non hodgkins lymphoma (SCD/XRT/chemo at [facility] 2003), TIA, tachycardia, occipital neuralgia, and neuropathy.  She was recently admitted here to [place] [facility] acute rehab on 04/19/19. Psychiatry initially evaluated the patient for depression on 05/07/19. At that time, patient endorsed dysthymia, insomnia, intermittent anorexia, difficulty concentrating, and anxiety. Patient refused Lexapro, and states she doesn't like to take new medications.  Today (05/15/19), patient continued to complain of insomnia. She endorses being tired during the day and being awake and more energetic at night. This daytime fatigue limits her ability to participate in rehabilitation services. She says that sleep medications don't work for her, and that she wants something natural. Throughout the interview, patient was nodding off. When inquired about her willingness to try Remeron, patient fell asleep and would no longer wake to being questioned. The interview was concluded in order to allow the patient to rest.    Symptom progression/resolution  Patient had limited attention due to nodding off, so limited ROS was obtained.  Patient denies being tearful/ sad recently. Stated appetite was good, denied any change in memory or concentration, she denied being depressed or anxious, other than statement regarding not being able to get a full breath in. Of note pulse ox % on room air.     MEDICATIONS  (STANDING):  aspirin enteric coated 81 milliGRAM(s) Oral daily  buDESOnide  80 MICROgram(s)/formoterol 4.5 MICROgram(s) Inhaler 2 Puff(s) Inhalation two times a day  cyanocobalamin 1000 MICROGram(s) Oral daily  enoxaparin Injectable 40 milliGRAM(s) SubCutaneous daily  lidocaine   Patch 1 Patch Transdermal <User Schedule>  lubiprostone 8 MICROGram(s) Oral two times a day  metoprolol tartrate 25 milliGRAM(s) Oral two times a day  pantoprazole    Tablet 40 milliGRAM(s) Oral before breakfast  tiotropium 18 MICROgram(s) Capsule 1 Capsule(s) Inhalation daily  valproic acid 250 milliGRAM(s) Oral every 12 hours  zinc oxide 20% Ointment 1 Application(s) Topical daily    MEDICATIONS  (PRN):  acetaminophen   Tablet .. 650 milliGRAM(s) Oral every 6 hours PRN Moderate Pain (4 - 6)  ALPRAZolam 0.25 milliGRAM(s) Oral three times a day PRN anxiety  docusate sodium 100 milliGRAM(s) Oral daily PRN Constipation  melatonin Oral Tab/Cap - Peds 3 milliGRAM(s) Oral at bedtime PRN Insomnia  ondansetron    Tablet 4 milliGRAM(s) Oral every 6 hours PRN Nausea    Mental Status Examination:  General Appearance: Female patient lying in bed, NAD, appears tired.  Remarkable Features: Patient consistently nodded off during interview.  Psychomotor State: Psychomotor retardation due to fatigue.  Abnormal movements and posture: Left-sided deficits as per neuropsych due to SDHs.  Social interaction and affect: Calm, cooperative with questioning. Flat affect, attributable to fatigue.  Cognition, perceptual abnormalities: Neurocognitive disorder due to SDHs.  Consciousness: Lethargic, arousable to verbal stimulation. Eventually patient fell asleep despite questioning.  Orientation: Oriented to person, place, and situation.  Memory: Recent/Past/Remote: Recent memory appears intact, patient able to recount details about her recent hospital stay.  Attention: Impaired due to fatigue, patient would intermittently nod off.  Naming/Repitition/Comprehension: Deficits as per neuropsych.  Insight/Judgment: fair, although patient unwilling to try new medications.    Laboratory testing:                        10.2   9.99  )-----------( 479      ( 15 May 2019 05:30 )             32.5     05-15    140  |  105  |  14  ----------------------------<  101<H>  4.3   |  28  |  0.60    Ca    8.9      15 May 2019 05:30  Mg     1.7     05-15    TPro  5.8<L>  /  Alb  2.2<L>  /  TBili  0.1<L>  /  DBili  x   /  AST  14  /  ALT  13  /  AlkPhos  83  05-15    LIVER FUNCTIONS - ( 15 May 2019 05:30 )  Alb: 2.2 g/dL / Pro: 5.8 g/dL / ALK PHOS: 83 U/L / ALT: 13 U/L DA / AST: 14 U/L / GGT: x           Vital Signs Last 24 Hrs  T(C): 36.6 (15 May 2019 09:00), Max: 36.8 (14 May 2019 20:22)  T(F): 97.8 (15 May 2019 09:00), Max: 98.3 (14 May 2019 20:22)  HR: 85 (15 May 2019 09:00) (85 - 102)  BP: 106/73 (15 May 2019 09:00) (89/62 - 111/70)  BP(mean): --  RR: 14 (15 May 2019 11:01) (14 - 14)  SpO2: 99% (15 May 2019 11:01) (98% - 99%)    Psychiatric Diagnosis:  Neurocognitive deficits and delirium due to recent b/l SDHs and cerebral ischemia.  r/o permanent neurocognitive deficits due to multiple cerebral insults.  Expect improvement of neurocognitive function with rehabilitation.  Insomnia due to day-night reversal.    Recommendations:  Recommend Remeron 15mg QHS as sleep aide; relies on patient's willingness to take new meds.  Doubtful Melatonin would help, she states she is aware she has Xanax prn , but is not willing to take it, she has been consistent in her refusal   of psychopharmacology, even when explained it's benefits. Pt may also benefit from Modafinil to help daytime arousal.   Recommend Melatonin as a more natural alternative to Remeron if patient unwilling to take it.  Try to keep patient awake during the day with non-pharmacologic intervention to reorient circadian rhythm  Discuss all medical decisions with patient prior to implementation as to maintain trust.  Continue with PT/OT/rehabilitation as tolerated by patient.    Guidance for patient management: As per medical team.    Hospital course Follow-up: Will follow up as needed, reconsult should pt change her mind regarding psychopharm interventions.

## 2019-05-15 NOTE — PROGRESS NOTE ADULT - ASSESSMENT
59 year old female s/p suspected CVA vs. encephalitis  admitted to rehab with left-sided weakness and functional deficits.     # CVA  continue ASA  continue PMR as tolerated    #Anxiety  Continue Xanax PRN  Await Psych evaluation    #Tachycardia  Continue metoprolol  Monitor BP and HR    #Acute hypokalemia  Continue oral supplementation  Monitor     #Interstitial lung disease  Home O2 dependent   Continue bronchodialators: pulmicort, xopenex, spiriva    #Seizure  Continue Depakote  Monitor for siezure    #Constipation  Bowel regimen  Appreciate GI follow up    #back pain  Lidocaine TD  Tylenol PRN    #Vitamin B12 deficiency  Continue cyanocobalamin 1000 MICROGram(s) Oral daily    # GERD  Continue pantoprazole     #DVT ppx  SCDs  Lovenox 40sq QD

## 2019-05-15 NOTE — CHART NOTE - NSCHARTNOTEFT_GEN_A_CORE
Nutrition Follow Up Note  Hospital Course (Per Electronic Medical Record):   Source: Medical Record [X] Patient [X]     Diet: Lacto-Ovo Vegetarian     Patient tolerating her diet noted consuming between % of meals as per nursing. Today during visit , patient reports nausea, no vomiting noted. Patient was receiving Ensure pudding 2/2 evidence of severe malnutrition however patient is refusing supplements , dislikes them . Will provide additional protein on breakfast meal (eggs).     Current Weight: (5/13) 130.2, ? 16 lb weight gain since admission , no edema.     Pertinent Medications: MEDICATIONS  (STANDING):  aspirin enteric coated 81 milliGRAM(s) Oral daily  buDESOnide  80 MICROgram(s)/formoterol 4.5 MICROgram(s) Inhaler 2 Puff(s) Inhalation two times a day  cyanocobalamin 1000 MICROGram(s) Oral daily  enoxaparin Injectable 40 milliGRAM(s) SubCutaneous daily  lidocaine   Patch 1 Patch Transdermal <User Schedule>  lubiprostone 8 MICROGram(s) Oral two times a day  metoprolol tartrate 25 milliGRAM(s) Oral two times a day  pantoprazole    Tablet 40 milliGRAM(s) Oral before breakfast  tiotropium 18 MICROgram(s) Capsule 1 Capsule(s) Inhalation daily  valproic acid 250 milliGRAM(s) Oral every 12 hours  zinc oxide 20% Ointment 1 Application(s) Topical daily    MEDICATIONS  (PRN):  acetaminophen   Tablet .. 650 milliGRAM(s) Oral every 6 hours PRN Moderate Pain (4 - 6)  ALPRAZolam 0.25 milliGRAM(s) Oral three times a day PRN anxiety  docusate sodium 100 milliGRAM(s) Oral daily PRN Constipation  melatonin Oral Tab/Cap - Peds 3 milliGRAM(s) Oral at bedtime PRN Insomnia  ondansetron    Tablet 4 milliGRAM(s) Oral every 6 hours PRN Nausea      Pertinent Labs:  05-15 Na140 mmol/L Glu 101 mg/dL<H> K+ 4.3 mmol/L Cr  0.60 mg/dL BUN 14 mg/dL 05-15 Alb 2.2 g/dL<L>        Skin: intact    Edema: none    Last BM: on (%/14)    Estimated Needs:   [X] No Change since Previous Assessment  [X] Recalculated:     Previous Nutrition Diagnosis: Severe Malnutrition    Nutrition Diagnosis is [X] Ongoing         New Nutrition Diagnosis: [X] Not Applicable      Interventions:   1. Recommend Continue current diet , & provide additional protein to diet      Monitoring & Evaluation: will monitor:  [X] Weights   [X] PO Intake   [X] Follow Up (Per Protocol)  [X] Tolerance to Diet Prescription       RD to follow as per Nutrition protocol  Ayana Grider RDN

## 2019-05-15 NOTE — PROGRESS NOTE ADULT - SUBJECTIVE AND OBJECTIVE BOX
CHIEF COMPLAINT: refusing to participate in therapy today, after encouraging agreed to bedside exercises.       HISTORY OF PRESENT ILLNESS    60 yo female with PMHx of MVP, chronic SDH, interstitial lung disease/pneumothorax, pulmonary nodules, meniere's, non hodgkins lymphoma (SCD/XRT/chemo at [facility] 2003), TIA, tachycardia, occipital neuralgia, and neuropathy.  Patient was initially admitted to [facility] on 3/13/19 with acute onset of b/l UE weakness associated with nausea, blurry vision, and HA. CT head showed chronic B/L frontal SDH. The rest of the workup was negative. CVA/TIA r/out.  Pt was discharged home but on 3/17 had apparent seizure.  She was admitted to [facility] and intubated and placed on depakote.  CTA H/N, MRI, EEG unremarkable.  ID Recommended LP for fever; family deferred.  Transferred to [facility] on 3/19/19.  MRI brain showed cerebral ischemia. The rest of the workup was negative, this including CSF studies. EEG neg seizures. (Evaluated by lymphoma specialist/onco, PET 4/5/19 r/o recurrence of lymphoma. Increased signal at base of tongue to follow up outpt ENT). Additionally, course complicated with pneumothorax, pulmonary consulted, no interventions, self resolved.  Tachycardia - per pulm, related related to chronic lung disease.  GI consulted for rectal pain; diagnosed c anal fissure/constipation, bowel regimen started. KUB 4/12/19 No stool, no free air, no distended loops of small bowel, fibroids. Refused further workup.   Other issues while admitted:  Hypotension - home diltiazem held per cardio  dizziness - sporadic, self resolving.  Pt deferred MRI.  May be 2/2 to hx of Meniere's disease.      *TTE 3/18/19 EF 60%, mod-severe MR.   MRI brain 3/29/19 cortically based restricted diffusion within R>L frontoparietal region as well as additional scattered small foci, may represent evolution of a recent ischemia vs improving encephalitis. Patient medically optimized and cleared for discharge to acute rehab at [facility] on 4/19/19.   At [facility] patient presented with rectal pain, fluctuating tachycardia 110-150 at rest and orthostatic hypotension. GI, hematology, cardiology and pulmonary evaluation requested. Rectal bleeding developed in the morning of 4/21/19 with severe hypotension and tachycardia. RRT called. Patient transferred to ICU. Patient's course in ICU reviewed and discussed with staff. Investigations, current lab results and recent treatments also reviewed and discussed. Patient was attempted to be evaluated by PT twice but unable to tolerate evaluation due to severe tachycardia up to 150 at rest and symptomatic orthostatic hypotension as low as 80s. Patient is preparing for colonoscopy today to evaluate the source of rectal bleed. S/p multiple PRBC transfusions. Presently on IV antibiotics and IV steroids. (09 May 2019 12:11)        PAST MEDICAL & SURGICAL HISTORY:  Interstitial lung disease: on home o2 prn  NHL (non-Hodgkin's lymphoma): Agem 43 sp chemo/rt/stem cell  Transient cerebral ischemia, unspecified type  Mitral prolapse  History of tonsillectomy  History of appendectomy        VITALS  Vital Signs Last 24 Hrs  T(C): 36.6 (15 May 2019 09:00), Max: 36.8 (14 May 2019 20:22)  T(F): 97.8 (15 May 2019 09:00), Max: 98.3 (14 May 2019 20:22)  HR: 85 (15 May 2019 09:00) (85 - 102)  BP: 106/73 (15 May 2019 09:00) (89/62 - 111/70)  BP(mean): --  RR: 14 (15 May 2019 11:01) (14 - 14)  SpO2: 99% (15 May 2019 11:01) (98% - 99%)      PHYSICAL EXAM  Constitutional - NAD, Comfortable  HEENT - NCAT, EOMI  Neck - Supple, No limited ROM  Chest - CTA bilaterally,   Cardiovascular - RRR, S1S2, No murmurs  Abdomen - BS+, Soft, NTND  Extremities - No C/C/E, No calf tenderness   Skin-no rash  Neurologic Exam -  no new focal deficits                     RECENT LABS                        10.2   9.99  )-----------( 479      ( 15 May 2019 05:30 )             32.5     05-15    140  |  105  |  14  ----------------------------<  101<H>  4.3   |  28  |  0.60    Ca    8.9      15 May 2019 05:30  Mg     1.7     05-15    TPro  5.8<L>  /  Alb  2.2<L>  /  TBili  0.1<L>  /  DBili  x   /  AST  14  /  ALT  13  /  AlkPhos  83  05-15      LIVER FUNCTIONS - ( 15 May 2019 05:30 )  Alb: 2.2 g/dL / Pro: 5.8 g/dL / ALK PHOS: 83 U/L / ALT: 13 U/L DA / AST: 14 U/L / GGT: x                 Valproic Acid Level, Serum: 38 ug/mL (05-10-19 @ 14:31)  Valproic Acid Level, Serum: 60 ug/mL (04-22-19 @ 13:40)                RADIOLOGY/OTHER RESULTS      CURRENT MEDICATIONS  MEDICATIONS  (STANDING):  aspirin enteric coated 81 milliGRAM(s) Oral daily  buDESOnide  80 MICROgram(s)/formoterol 4.5 MICROgram(s) Inhaler 2 Puff(s) Inhalation two times a day  cyanocobalamin 1000 MICROGram(s) Oral daily  enoxaparin Injectable 40 milliGRAM(s) SubCutaneous daily  lidocaine   Patch 1 Patch Transdermal <User Schedule>  lubiprostone 8 MICROGram(s) Oral two times a day  metoprolol tartrate 25 milliGRAM(s) Oral two times a day  pantoprazole    Tablet 40 milliGRAM(s) Oral before breakfast  tiotropium 18 MICROgram(s) Capsule 1 Capsule(s) Inhalation daily  valproic acid 250 milliGRAM(s) Oral every 12 hours  zinc oxide 20% Ointment 1 Application(s) Topical daily    MEDICATIONS  (PRN):  acetaminophen   Tablet .. 650 milliGRAM(s) Oral every 6 hours PRN Moderate Pain (4 - 6)  ALPRAZolam 0.25 milliGRAM(s) Oral three times a day PRN anxiety  docusate sodium 100 milliGRAM(s) Oral daily PRN Constipation  melatonin Oral Tab/Cap - Peds 3 milliGRAM(s) Oral at bedtime PRN Insomnia  ondansetron    Tablet 4 milliGRAM(s) Oral every 6 hours PRN Nausea

## 2019-05-15 NOTE — PROGRESS NOTE ADULT - SUBJECTIVE AND OBJECTIVE BOX
CC: f/u for leukocytosis    Patient reports feels very sob    REVIEW OF SYSTEMS:  All other review of systems negative (Comprehensive ROS)    Antimicrobials Day #  :    Other Medications Reviewed    T(F): 97.8 (05-15-19 @ 09:00), Max: 98.3 (05-14-19 @ 20:22)  HR: 85 (05-15-19 @ 09:00)  BP: 106/73 (05-15-19 @ 09:00)  RR: 14 (05-15-19 @ 11:01)  SpO2: 99% (05-15-19 @ 11:01)  Wt(kg): --    PHYSICAL EXAM:  General: alert, no acute distress  Eyes:  anicteric, no conjunctival injection, no discharge  Oropharynx: no lesions or injection 	  Neck: supple, without adenopathy  Lungs: course to auscultation  Heart: regular rate and rhythm; no murmur, rubs or gallops  Abdomen: soft, nondistended, nontender, without mass or organomegaly  Skin: no lesions  Extremities: no clubbing, cyanosis, or edema  Neurologic: alert, oriented, moves rue, legs well, left arm with spastic paresis    LAB RESULTS:                        10.2   9.99  )-----------( 479      ( 15 May 2019 05:30 )             32.5     05-15    140  |  105  |  14  ----------------------------<  101<H>  4.3   |  28  |  0.60    Ca    8.9      15 May 2019 05:30  Mg     1.7     05-15    TPro  5.8<L>  /  Alb  2.2<L>  /  TBili  0.1<L>  /  DBili  x   /  AST  14  /  ALT  13  /  AlkPhos  83  05-15    LIVER FUNCTIONS - ( 15 May 2019 05:30 )  Alb: 2.2 g/dL / Pro: 5.8 g/dL / ALK PHOS: 83 U/L / ALT: 13 U/L DA / AST: 14 U/L / GGT: x             MICROBIOLOGY:  RECENT CULTURES:  05-12 @ 11:00 .Stool Feces     No Protozoa seen by trichrome stain  No Helminths or Protozoa seen in formalin concentrate  performed by iodine stain  (routine O+P not evaluated for Microsporidia,  Cryptosporidia, Cyclospora, or Isospora.)  Note: One negative specimen does not rule  out the possibility of a parasitic infection.  Few WBC's          RADIOLOGY REVIEWED:    < from: Xray Chest 1 View- PORTABLE-Routine (04.29.19 @ 06:52) >    EXAM:  XR CHEST PORTABLE ROUTINE 1V      PROCEDURE DATE:  04/29/2019        INTERPRETATION:  Single view chest    History shortness of breath, interstitial lung disease    Comparison yesterday    The central line remains in place tip centered over the upper right   atrium. There is moderate patchy interstitial opacity, unchanged without   lobar consolidation or layering effusion. There is mild cardiomegaly.   There is no pneumothorax.      < end of copied text >    Assessment:  Patient s/p prolonged stormy stay at [facility] with pneumonia, pneumothorax, multifocal cva vs air emboli to brain, had gi bleed upon arrival, had leukocytosis now resolved. No infection apparent at present.   Plan:  monitor off antibiotics  call if further input is needed

## 2019-05-15 NOTE — PROGRESS NOTE ADULT - SUBJECTIVE AND OBJECTIVE BOX
Follow-up Pulm Progress Note    Tres Maharaj MD  [phone]    No new respiratory events overnight.  Denies SOB/CP.   Patient has returned to rehabilitation after acute medical stay. Dyspnea or remains unchanged. Breathing remains unchanged. Medical records from [place] to be reviewed. They apparently revealed the patient has diagnosed interstitial lung disease of uncertain etiology  Medications:  Vital Signs Last 24 Hrs  T(C): 36.6 (15 May 2019 09:00), Max: 36.8 (14 May 2019 20:22)  T(F): 97.8 (15 May 2019 09:00), Max: 98.3 (14 May 2019 20:22)  HR: 85 (15 May 2019 09:00) (85 - 102)  BP: 106/73 (15 May 2019 09:00) (89/62 - 111/70)  BP(mean): --  RR: 14 (15 May 2019 11:01) (14 - 14)  SpO2: 99% (15 May 2019 11:01) (98% - 99%)          05-14 @ 07:01  -  05-15 @ 07:00  --------------------------------------------------------  IN: 400 mL / OUT: 0 mL / NET: 400 mL        LABS:                        10.2   9.99  )-----------( 479      ( 15 May 2019 05:30 )             32.5     05-15    140  |  105  |  14  ----------------------------<  101<H>  4.3   |  28  |  0.60    Ca    8.9      15 May 2019 05:30  Mg     1.7     05-15    TPro  5.8<L>  /  Alb  2.2<L>  /  TBili  0.1<L>  /  DBili  x   /  AST  14  /  ALT  13  /  AlkPhos  83  05-15              CULTURES:  Culture Results:   No Protozoa seen by trichrome stain  No Helminths or Protozoa seen in formalin concentrate  performed by iodine stain  (routine O+P not evaluated for Microsporidia,  Cryptosporidia, Cyclospora, or Isospora.)  Note: One negative specimen does not rule  out the possibility of a parasitic infection.  Few WBC's (05-12 @ 11:00)    Most recent blood culture -- 05-12 @ 11:00   -- -- .Stool Feces 05-12 @ 11:00      Physical Examination:  PULM: Clear to auscultation bilaterally, no significant sputum production  CVS: Regular rate and rhythm, no murmurs, rubs, or gallops  ABD: Soft, non-tender  EXT:  No clubbing, cyanosis, or edema    RADIOLOGY REVIEWED  CXR:    CT chest:    TTE:

## 2019-05-15 NOTE — PROGRESS NOTE ADULT - ASSESSMENT
58 yo RHWF female with complex medical history  s/p suspected multiple  CVAs vs. encephalitis  readmitted to acute rehabilitation  unit  with spastic quadriparesis, more pronounced in left UE, sensory impairment, gait impairment   and functional deficits.      #r/o CVA  ASA continues c GI ppx    #Leukocytosis  -improved  -afebrile, ID and Medicine are following.  -will continue to monitor off ABx    #tachycardia  -metoprolol BID    #ILD  -pulmicort, spiriva, pulmonary is following  -on home O2 PRN.     #Seizure  Continue Depakote, seizure ppx    #rectal pain/hx of bleed  GI is following, case discussed, will follow recommendations, Amitiza continues.    # anxiety  -Xanax as needed  -Psychiatry evaluation    #debility  Lovenox SQ cleared by GI, Nutrition eval. 58 yo RHWF female with complex medical history  s/p suspected multiple  CVAs vs. encephalitis  readmitted to acute rehabilitation  unit  with spastic quadriparesis, more pronounced in left UE, sensory impairment, gait impairment   and functional deficits.      #r/o CVA  ASA continues c GI ppx    #Leukocytosis  -improved  -afebrile, ID and Medicine are following.  -will continue to monitor off ABx    #tachycardia  -metoprolol BID    #ILD  -pulmicort, spiriva, pulmonary is following  -on home O2 PRN.     #Seizure  Continue Depakote, seizure ppx    #rectal pain/hx of bleed  GI is following, case discussed, will follow recommendations, Amitiza continues.    # anxiety, insomnia  -Xanax as needed  -Psychiatry evaluation appropriated will offer Remeron trial    #debility  Lovenox SQ cleared by GI, Nutrition eval.

## 2019-05-16 PROCEDURE — 99233 SBSQ HOSP IP/OBS HIGH 50: CPT

## 2019-05-16 PROCEDURE — 93010 ELECTROCARDIOGRAM REPORT: CPT

## 2019-05-16 PROCEDURE — 99232 SBSQ HOSP IP/OBS MODERATE 35: CPT

## 2019-05-16 RX ADMIN — Medication 650 MILLIGRAM(S): at 15:49

## 2019-05-16 RX ADMIN — ZINC OXIDE 1 APPLICATION(S): 200 OINTMENT TOPICAL at 22:26

## 2019-05-16 RX ADMIN — MIRTAZAPINE 15 MILLIGRAM(S): 45 TABLET, ORALLY DISINTEGRATING ORAL at 23:58

## 2019-05-16 RX ADMIN — Medication 650 MILLIGRAM(S): at 08:10

## 2019-05-16 RX ADMIN — Medication 25 MILLIGRAM(S): at 06:28

## 2019-05-16 RX ADMIN — Medication 650 MILLIGRAM(S): at 14:27

## 2019-05-16 RX ADMIN — Medication 250 MILLIGRAM(S): at 06:29

## 2019-05-16 RX ADMIN — Medication 650 MILLIGRAM(S): at 07:34

## 2019-05-16 RX ADMIN — Medication 250 MILLIGRAM(S): at 15:50

## 2019-05-16 RX ADMIN — Medication 650 MILLIGRAM(S): at 23:22

## 2019-05-16 RX ADMIN — PREGABALIN 1000 MICROGRAM(S): 225 CAPSULE ORAL at 11:10

## 2019-05-16 RX ADMIN — PANTOPRAZOLE SODIUM 40 MILLIGRAM(S): 20 TABLET, DELAYED RELEASE ORAL at 06:28

## 2019-05-16 RX ADMIN — ENOXAPARIN SODIUM 40 MILLIGRAM(S): 100 INJECTION SUBCUTANEOUS at 11:10

## 2019-05-16 RX ADMIN — Medication 650 MILLIGRAM(S): at 22:22

## 2019-05-16 NOTE — PROGRESS NOTE ADULT - ATTENDING COMMENTS
Poor participation in therapy, refusing medications periodically, responds to emotional support and encouragement.  Neurological exam unchanged.  Case discussed with Medicine, Pulmonary and ID.  Discharge plan discussed with SW.  [facility]HA placement is in progress.

## 2019-05-16 NOTE — PROGRESS NOTE ADULT - ASSESSMENT
58 yo RHWF female with complex medical history  s/p suspected multiple  CVAs vs. encephalitis   being readmitted to acute rehabilitation  unit  with spastic quadriparesis, more pronounced in left UE, sensory impairment, gait impairment  and functional deficits.      #r/o CVA  ASA continues c GI ppx    #Leukocytosis  -afebrile, ID and Medicine are following.  -will continue to monitor off ABx, non toxic.     #tachycardia  -Metoprolol BID.     #ILD  -Pulmicort, Spiriva, pulmonary evaluation placed  to Dr Maharaj.   -on home O2 PRN.     #Seizure  Continue Depakote, seizure ppx    #rectal pain/hx of bleed  GI is following, case discussed, will follow recommendations, Amitiza continues.    # anxiety  -Xanax as needed  -Psychiatry evaluation    #debility  Lovenox SQ cleared by GI, Nutrition eval.

## 2019-05-16 NOTE — PROGRESS NOTE ADULT - ASSESSMENT
59 year old female s/p suspected CVA vs. encephalitis admitted to rehab with left-sided weakness and functional deficits.     #r/o CVA  continue ASA  GI ppx    #Tachycardia - resolved  continue metoprolol BID, monitor closely    #ILD  pulmicort, xopenex, spiriva  on home O2 PRN.     #Seizure  Continue Depakote, seizure ppx, f/u neurology    #constipation/ rectal pain  GI evaluation of nausea and rectal pain, bowel regimen for constipation    #back pain  Tylenol and lidocaine patch.     #Vitamin B12 deficiency  patient reports she is vegetarian  continue cyanocobalamin 1000 MICROGram(s) Oral daily    # GERD  continue pantoprazole     #DVT ppx  Lovenox 59 year old female s/p suspected CVA vs. encephalitis admitted to rehab with left-sided weakness and functional deficits.     #r/o CVA  continue ASA  GI ppx    #Tachycardia - resolved  continue metoprolol BID, monitor closely    #ILD  pulmicort, xopenex, spiriva  on home O2 PRN.     #Seizure  Continue Depakote, seizure ppx, f/u neurology    #constipation/ rectal pain  GI following for nausea and rectal pain, bowel regimen for constipation    #back pain  Tylenol and lidocaine patch.     #Vitamin B12 deficiency  patient reports she is vegetarian  continue cyanocobalamin 1000 MICROGram(s) Oral daily    # GERD  continue pantoprazole     #DVT ppx  Lovenox

## 2019-05-16 NOTE — PROGRESS NOTE ADULT - SUBJECTIVE AND OBJECTIVE BOX
CHIEF COMPLAINT: Rectal pain overnight, insomnia.       HISTORY OF PRESENT ILLNESS  60 yo female with PMHx of MVP, chronic SDH, interstitial lung disease/pneumothorax, pulmonary nodules, meniere's, non hodgkins lymphoma (SCD/XRT/chemo at [facility] 2003), TIA, tachycardia, occipital neuralgia, and neuropathy.  Patient was initially admitted to [facility] on 3/13/19 with acute onset of b/l UE weakness associated with nausea, blurry vision, and HA. CT head showed chronic B/L frontal SDH. The rest of the workup was negative. CVA/TIA r/out.  Pt was discharged home but on 3/17 had apparent seizure.  She was admitted to [facility] and intubated and placed on depakote.  CTA H/N, MRI, EEG unremarkable.  ID Recommended LP for fever; family deferred.  Transferred to [facility] on 3/19/19.  MRI brain showed cerebral ischemia. The rest of the workup was negative, this including CSF studies. EEG neg seizures. (Evaluated by lymphoma specialist/onco, PET 4/5/19 r/o recurrence of lymphoma. Increased signal at base of tongue to follow up outpt ENT). Additionally, course complicated with pneumothorax, pulmonary consulted, no interventions, self resolved.  Tachycardia - per pulm, related related to chronic lung disease.  GI consulted for rectal pain; diagnosed c anal fissure/constipation, bowel regimen started. KUB 4/12/19 No stool, no free air, no distended loops of small bowel, fibroids. Refused further workup.   Other issues while admitted:  Hypotension - home diltiazem held per cardio  dizziness - sporadic, self resolving.  Pt deferred MRI.  May be 2/2 to hx of Meniere's disease.      *TTE 3/18/19 EF 60%, mod-severe MR.   MRI brain 3/29/19 cortically based restricted diffusion within R>L frontoparietal region as well as additional scattered small foci, may represent evolution of a recent ischemia vs improving encephalitis. Patient medically optimized and cleared for discharge to acute rehab at [facility] on 4/19/19.   At [facility] patient presented with rectal pain, fluctuating tachycardia 110-150 at rest and orthostatic hypotension. GI, hematology, cardiology and pulmonary evaluation requested. Rectal bleeding developed in the morning of 4/21/19 with severe hypotension and tachycardia. RRT called. Patient transferred to ICU. Patient's course in ICU reviewed and discussed with staff. Investigations, current lab results and recent treatments also reviewed and discussed. Patient was attempted to be evaluated by PT twice but unable to tolerate evaluation due to severe tachycardia up to 150 at rest and symptomatic orthostatic hypotension as low as 80s. Patient is preparing for colonoscopy today to evaluate the source of rectal bleed. S/p multiple PRBC transfusions. Presently on IV antibiotics and IV steroids. (09 May 2019 12:11)      PAST MEDICAL & SURGICAL HISTORY:  Interstitial lung disease: on home o2 prn  NHL (non-Hodgkin's lymphoma): Agem 45 sp chemo/rt/stem cell  Transient cerebral ischemia, unspecified type  Mitral prolapse  History of tonsillectomy  History of appendectomy          REVIEW OF SYMPTOMS  [X] Constitutional WNL     [X] Cardio WNL            [X] Resp WNL               [X]  WNL                   [X] Heme WNL              [X] Endo WNL                         [X] MSK WNL             [X] Cognitive WNL        [X] Psych WNL      VITALS  Vital Signs Last 24 Hrs  T(C): 36.7 (16 May 2019 07:36), Max: 36.7 (16 May 2019 07:36)  T(F): 98 (16 May 2019 07:36), Max: 98 (16 May 2019 07:36)  HR: 71 (16 May 2019 07:36) (71 - 101)  BP: 115/77 (16 May 2019 07:36) (96/59 - 115/77)  BP(mean): --  RR: 14 (16 May 2019 07:36) (14 - 14)  SpO2: 99% (16 May 2019 07:36) (97% - 99%)      PHYSICAL EXAM  Constitutional - Frail  HEENT - NCAT, EOMI  Neck - Supple, No limited ROM  Chest - No wheeze, No rhonchi, No crackles  Cardiovascular - RRR, S1S2, No murmurs  Abdomen - BS+, Soft, NTND  Extremities - No C/C/E, No calf tenderness   Skin-no rash      Neurologic Exam - no new deficits.                 Balance - impaired     Psychiatric - Anxious, depressed, Affect WNL         FUNCTIONAL PROGRESS  Gait - 6ft c mod A  ADLs - mod A  Transfers - mod A  Functional transfer - mod A    RECENT LABS                        10.2   9.99  )-----------( 479      ( 15 May 2019 05:30 )             32.5     05-15    140  |  105  |  14  ----------------------------<  101<H>  4.3   |  28  |  0.60    Ca    8.9      15 May 2019 05:30  Mg     1.7     05-15    TPro  5.8<L>  /  Alb  2.2<L>  /  TBili  0.1<L>  /  DBili  x   /  AST  14  /  ALT  13  /  AlkPhos  83  05-15      LIVER FUNCTIONS - ( 15 May 2019 05:30 )  Alb: 2.2 g/dL / Pro: 5.8 g/dL / ALK PHOS: 83 U/L / ALT: 13 U/L DA / AST: 14 U/L / GGT: x                 Valproic Acid Level, Serum: 38 ug/mL (05-10-19 @ 14:31)  Valproic Acid Level, Serum: 60 ug/mL (04-22-19 @ 13:40)            RADIOLOGY/OTHER RESULTS      CURRENT MEDICATIONS  MEDICATIONS  (STANDING):  aspirin enteric coated 81 milliGRAM(s) Oral daily  buDESOnide  80 MICROgram(s)/formoterol 4.5 MICROgram(s) Inhaler 2 Puff(s) Inhalation two times a day  cyanocobalamin 1000 MICROGram(s) Oral daily  enoxaparin Injectable 40 milliGRAM(s) SubCutaneous daily  lidocaine   Patch 1 Patch Transdermal <User Schedule>  lubiprostone 8 MICROGram(s) Oral two times a day  metoprolol tartrate 25 milliGRAM(s) Oral two times a day  mirtazapine 15 milliGRAM(s) Oral at bedtime  pantoprazole    Tablet 40 milliGRAM(s) Oral before breakfast  tiotropium 18 MICROgram(s) Capsule 1 Capsule(s) Inhalation daily  valproic acid 250 milliGRAM(s) Oral every 12 hours  zinc oxide 20% Ointment 1 Application(s) Topical daily    MEDICATIONS  (PRN):  acetaminophen   Tablet .. 650 milliGRAM(s) Oral every 6 hours PRN Moderate Pain (4 - 6)  ALPRAZolam 0.25 milliGRAM(s) Oral three times a day PRN anxiety  docusate sodium 100 milliGRAM(s) Oral daily PRN Constipation  ondansetron    Tablet 4 milliGRAM(s) Oral every 6 hours PRN Nausea      ASSESSMENT & PLAN          GI/Bowel Management - Amitiza-refuses   Management - Toilet Q2  Skin - Turn Q2  Pain - Tylenol PRN  DVT PPX - Lovenox     Continue comprehensive acute rehab program consisting of 3hrs/day of OT/PT and SLP.

## 2019-05-16 NOTE — PROGRESS NOTE ADULT - SUBJECTIVE AND OBJECTIVE BOX
Patient is a 59 year old  Female who presents with a chief complaint of debility/functional deficits (15 May 2019 16:24)        MEDICATIONS  (STANDING):  aspirin enteric coated 81 milliGRAM(s) Oral daily  buDESOnide  80 MICROgram(s)/formoterol 4.5 MICROgram(s) Inhaler 2 Puff(s) Inhalation two times a day  cyanocobalamin 1000 MICROGram(s) Oral daily  enoxaparin Injectable 40 milliGRAM(s) SubCutaneous daily  lidocaine   Patch 1 Patch Transdermal <User Schedule>  lubiprostone 8 MICROGram(s) Oral two times a day  metoprolol tartrate 25 milliGRAM(s) Oral two times a day  mirtazapine 15 milliGRAM(s) Oral at bedtime  pantoprazole    Tablet 40 milliGRAM(s) Oral before breakfast  tiotropium 18 MICROgram(s) Capsule 1 Capsule(s) Inhalation daily  valproic acid 250 milliGRAM(s) Oral every 12 hours  zinc oxide 20% Ointment 1 Application(s) Topical daily    MEDICATIONS  (PRN):  acetaminophen   Tablet .. 650 milliGRAM(s) Oral every 6 hours PRN Moderate Pain (4 - 6)  ALPRAZolam 0.25 milliGRAM(s) Oral three times a day PRN anxiety  docusate sodium 100 milliGRAM(s) Oral daily PRN Constipation  ondansetron    Tablet 4 milliGRAM(s) Oral every 6 hours PRN Nausea      REVIEW OF SYSTEMS:        PHYSICAL EXAM:  T(C): 36.7 (05-16-19 @ 07:36), Max: 36.7 (05-16-19 @ 07:36)  HR: 71 (05-16-19 @ 07:36) (71 - 101)  BP: 115/77 (05-16-19 @ 07:36) (96/59 - 115/77)  RR: 14 (05-16-19 @ 07:36) (14 - 14)  SpO2: 99% (05-16-19 @ 07:36) (97% - 99%)    I&O's Summary    15 May 2019 07:01  -  16 May 2019 07:00  --------------------------------------------------------  IN: 300 mL / OUT: 0 mL / NET: 300 mL            LABS:                        10.2   9.99  )-----------( 479      ( 15 May 2019 05:30 )             32.5     05-15    140  |  105  |  14  ----------------------------<  101<H>  4.3   |  28  |  0.60    Ca    8.9      15 May 2019 05:30  Mg     1.7     05-15    TPro  5.8<L>  /  Alb  2.2<L>  /  TBili  0.1<L>  /  DBili  x   /  AST  14  /  ALT  13  /  AlkPhos  83  05-15      RADIOLOGY & ADDITIONAL TESTS:    Consultant(s) Notes Reviewed:  [x] YES  [ ] NO    Care Discussed with Consultants/Other Providers [x] YES  [ ] NO Patient is a 59 year old  female who presents with a chief complaint of debility/functional deficits (15 May 2019 16:24)    Patient seen and examined.   Has intermittent constipation and rectal pain.      MEDICATIONS  (STANDING):  aspirin enteric coated 81 milliGRAM(s) Oral daily  buDESOnide  80 MICROgram(s)/formoterol 4.5 MICROgram(s) Inhaler 2 Puff(s) Inhalation two times a day  cyanocobalamin 1000 MICROGram(s) Oral daily  enoxaparin Injectable 40 milliGRAM(s) SubCutaneous daily  lidocaine   Patch 1 Patch Transdermal <User Schedule>  lubiprostone 8 MICROGram(s) Oral two times a day  metoprolol tartrate 25 milliGRAM(s) Oral two times a day  mirtazapine 15 milliGRAM(s) Oral at bedtime  pantoprazole    Tablet 40 milliGRAM(s) Oral before breakfast  tiotropium 18 MICROgram(s) Capsule 1 Capsule(s) Inhalation daily  valproic acid 250 milliGRAM(s) Oral every 12 hours  zinc oxide 20% Ointment 1 Application(s) Topical daily    MEDICATIONS  (PRN):  acetaminophen   Tablet .. 650 milliGRAM(s) Oral every 6 hours PRN Moderate Pain (4 - 6)  ALPRAZolam 0.25 milliGRAM(s) Oral three times a day PRN anxiety  docusate sodium 100 milliGRAM(s) Oral daily PRN Constipation  ondansetron    Tablet 4 milliGRAM(s) Oral every 6 hours PRN Nausea      REVIEW OF SYSTEMS:  CONSTITUTIONAL: No fever, weight loss, has fatigue  EYES: No eye pain, visual disturbances, or discharge  ENMT:  No difficulty hearing, tinnitus, vertigo; No sinus or throat pain  NECK: No pain or stiffness  RESPIRATORY: No cough, wheezing, chills or hemoptysis; intermittent shortness of breath  CARDIOVASCULAR: No chest pain, palpitations, dizziness, or leg swelling  GASTROINTESTINAL: No abdominal or epigastric pain, no nausea, no vomiting, or hematemesis; No diarrhea, has constipation, rectal pain. No melena or hematochezia.  GENITOURINARY: No dysuria, frequency, hematuria, or incontinence  NEUROLOGICAL: No headaches, memory loss, has loss of strength, and numbness, no tremors  SKIN: No itching, burning, rashes, or lesions   ENDOCRINE: No heat or cold intolerance; No hair loss  MUSCULOSKELETAL: No joint pain or swelling; No muscle pain, has back pain  PSYCHIATRIC: has depression, anxiety, mood swings, difficulty sleeping  HEME/LYMPH: No easy bruising, or bleeding gums  ALLERGY AND IMMUNOLOGIC: No hives or eczema          PHYSICAL EXAM:  T(C): 36.7 (05-16-19 @ 07:36), Max: 36.7 (05-16-19 @ 07:36)  HR: 71 (05-16-19 @ 07:36) (71 - 101)  BP: 115/77 (05-16-19 @ 07:36) (96/59 - 115/77)  RR: 14 (05-16-19 @ 07:36) (14 - 14)  SpO2: 99% (05-16-19 @ 07:36) (97% - 99%)    I&O's Summary    15 May 2019 07:01  -  16 May 2019 07:00  --------------------------------------------------------  IN: 300 mL / OUT: 0 mL / NET: 300 mL      GENERAL: NAD,   HEAD:  Atraumatic, Normocephalic  EYES: EOMI, PERRL, conjunctiva and sclera clear  ENMT:  Moist mucous membranes, Good dentition, No lesions  NECK: Supple, No JVD, Normal thyroid  NERVOUS SYSTEM:  Alert & Oriented X3, no new deficit  CHEST/LUNG: Clear to ascultation bilaterally; No rales, rhonchi, wheezing, or rubs  HEART: Regular rate and rhythm; has murmurs, no rubs, or gallops  ABDOMEN: Soft, Nontender, Nondistended; Bowel sounds present  EXTREMITIES:  2+ Peripheral Pulses, No clubbing, cyanosis, or edema  SKIN: No rash  PSYCH - emotional, depressed, anxious          LABS:                        10.2   9.99  )-----------( 479      ( 15 May 2019 05:30 )             32.5     05-15    140  |  105  |  14  ----------------------------<  101<H>  4.3   |  28  |  0.60    Ca    8.9      15 May 2019 05:30  Mg     1.7     05-15    TPro  5.8<L>  /  Alb  2.2<L>  /  TBili  0.1<L>  /  DBili  x   /  AST  14  /  ALT  13  /  AlkPhos  83  05-15      RADIOLOGY & ADDITIONAL TESTS:    Consultant(s) Notes Reviewed:  [x] YES  [ ] NO    Care Discussed with Consultants/Other Providers [x] YES  [ ] NO

## 2019-05-17 LAB
ALBUMIN SERPL ELPH-MCNC: 2.4 G/DL — LOW (ref 3.3–5)
ALP SERPL-CCNC: 81 U/L — SIGNIFICANT CHANGE UP (ref 40–120)
ALT FLD-CCNC: 15 U/L DA — SIGNIFICANT CHANGE UP (ref 10–45)
ANION GAP SERPL CALC-SCNC: 10 MMOL/L — SIGNIFICANT CHANGE UP (ref 5–17)
AST SERPL-CCNC: 20 U/L — SIGNIFICANT CHANGE UP (ref 10–40)
BILIRUB SERPL-MCNC: 0.2 MG/DL — SIGNIFICANT CHANGE UP (ref 0.2–1.2)
BUN SERPL-MCNC: 17 MG/DL — SIGNIFICANT CHANGE UP (ref 7–23)
CALCIUM SERPL-MCNC: 9.7 MG/DL — SIGNIFICANT CHANGE UP (ref 8.4–10.5)
CHLORIDE SERPL-SCNC: 102 MMOL/L — SIGNIFICANT CHANGE UP (ref 96–108)
CO2 SERPL-SCNC: 29 MMOL/L — SIGNIFICANT CHANGE UP (ref 22–31)
CREAT SERPL-MCNC: 0.6 MG/DL — SIGNIFICANT CHANGE UP (ref 0.5–1.3)
GLUCOSE SERPL-MCNC: 70 MG/DL — SIGNIFICANT CHANGE UP (ref 70–99)
HCT VFR BLD CALC: 35.4 % — SIGNIFICANT CHANGE UP (ref 34.5–45)
HGB BLD-MCNC: 11.3 G/DL — LOW (ref 11.5–15.5)
MCHC RBC-ENTMCNC: 29.3 PG — SIGNIFICANT CHANGE UP (ref 27–34)
MCHC RBC-ENTMCNC: 31.9 GM/DL — LOW (ref 32–36)
MCV RBC AUTO: 91.7 FL — SIGNIFICANT CHANGE UP (ref 80–100)
NRBC # BLD: 0 /100 WBCS — SIGNIFICANT CHANGE UP (ref 0–0)
PLATELET # BLD AUTO: 495 K/UL — HIGH (ref 150–400)
POTASSIUM SERPL-MCNC: 3.7 MMOL/L — SIGNIFICANT CHANGE UP (ref 3.5–5.3)
POTASSIUM SERPL-SCNC: 3.7 MMOL/L — SIGNIFICANT CHANGE UP (ref 3.5–5.3)
PROT SERPL-MCNC: 6.1 G/DL — SIGNIFICANT CHANGE UP (ref 6–8.3)
RBC # BLD: 3.86 M/UL — SIGNIFICANT CHANGE UP (ref 3.8–5.2)
RBC # FLD: 14.8 % — HIGH (ref 10.3–14.5)
SODIUM SERPL-SCNC: 141 MMOL/L — SIGNIFICANT CHANGE UP (ref 135–145)
WBC # BLD: 6.22 K/UL — SIGNIFICANT CHANGE UP (ref 3.8–10.5)
WBC # FLD AUTO: 6.22 K/UL — SIGNIFICANT CHANGE UP (ref 3.8–10.5)

## 2019-05-17 PROCEDURE — 99233 SBSQ HOSP IP/OBS HIGH 50: CPT

## 2019-05-17 PROCEDURE — 99232 SBSQ HOSP IP/OBS MODERATE 35: CPT

## 2019-05-17 RX ORDER — MIRTAZAPINE 45 MG/1
7.5 TABLET, ORALLY DISINTEGRATING ORAL AT BEDTIME
Refills: 0 | Status: DISCONTINUED | OUTPATIENT
Start: 2019-05-17 | End: 2019-06-06

## 2019-05-17 RX ADMIN — ENOXAPARIN SODIUM 40 MILLIGRAM(S): 100 INJECTION SUBCUTANEOUS at 11:18

## 2019-05-17 RX ADMIN — Medication 650 MILLIGRAM(S): at 06:24

## 2019-05-17 RX ADMIN — TIOTROPIUM BROMIDE 1 CAPSULE(S): 18 CAPSULE ORAL; RESPIRATORY (INHALATION) at 09:13

## 2019-05-17 RX ADMIN — Medication 250 MILLIGRAM(S): at 06:21

## 2019-05-17 RX ADMIN — BUDESONIDE AND FORMOTEROL FUMARATE DIHYDRATE 2 PUFF(S): 160; 4.5 AEROSOL RESPIRATORY (INHALATION) at 09:13

## 2019-05-17 RX ADMIN — Medication 25 MILLIGRAM(S): at 16:43

## 2019-05-17 RX ADMIN — Medication 250 MILLIGRAM(S): at 16:43

## 2019-05-17 RX ADMIN — PANTOPRAZOLE SODIUM 40 MILLIGRAM(S): 20 TABLET, DELAYED RELEASE ORAL at 06:21

## 2019-05-17 RX ADMIN — Medication 650 MILLIGRAM(S): at 22:00

## 2019-05-17 RX ADMIN — Medication 650 MILLIGRAM(S): at 21:13

## 2019-05-17 RX ADMIN — Medication 650 MILLIGRAM(S): at 07:00

## 2019-05-17 NOTE — PROGRESS NOTE ADULT - ASSESSMENT
59 year old female s/p suspected CVA vs. encephalitis admitted to rehab with left-sided weakness and functional deficits.     #r/o CVA  continue ASA  GI ppx    #Tachycardia - resolved  continue metoprolol BID, monitor closely    #ILD  pulmicort, xopenex, spiriva  on home O2 PRN.     #Seizure  Continue Depakote, seizure ppx, f/u neurology    #constipation/ rectal pain  GI following for nausea and rectal pain, bowel regimen for constipation    #back pain  Tylenol and lidocaine patch.     #Vitamin B12 deficiency  patient reports she is vegetarian  continue cyanocobalamin 1000 MICROGram(s) Oral daily    # GERD  continue pantoprazole     #DVT ppx  Lovenox 59 year old female s/p suspected CVA vs. encephalitis admitted to rehab with left-sided weakness and functional deficits.     #r/o CVA  continue ASA  GI ppx    #Tachycardia - resolved  continue metoprolol BID, monitor closely    #ILD  pulmicort, xopenex, spiriva  on home O2 PRN.     #Seizure  Continue Depakote, seizure ppx, f/u neurology    #constipation/ rectal pain  GI following for nausea and rectal pain, bowel regimen for constipation    #back pain  Tylenol and lidocaine patch.     #Vitamin B12 deficiency  patient reports she is vegetarian  continue cyanocobalamin 1000 MICROGram(s) Oral daily      # anxiety  -Xanax as needed  -Remeron had been added for insomnia and to improve appetite, will decrease dose for now in light of trouble waking up this morning and c/o nausea   -Psychiatry evaluation    # GERD  continue pantoprazole     #DVT ppx  Lovenox

## 2019-05-17 NOTE — PROGRESS NOTE ADULT - ASSESSMENT
60 yo RHWF female with complex medical history  s/p suspected multiple  CVAs vs. encephalitis   being readmitted to acute rehabilitation  unit  with spastic quadriparesis, more pronounced in left UE, sensory impairment, gait impairment  and functional deficits.      #r/o CVA  ASA continues c GI ppx    #Leukocytosis  -afebrile, ID and Medicine are following.  -will continue to monitor off ABx, non toxic.     #tachycardia  -Metoprolol BID.     #ILD  -Pulmicort, Spiriva, pulmonary evaluation placed  to Dr Maharja, pending consult  -on home O2 PRN.     #Seizure  Continue Depakote, seizure ppx    #rectal pain/hx of bleed  GI is following, case discussed, will follow recommendations, Amitiza continues.    # anxiety  -Xanax as needed  -Psychiatry evaluation  -Remeron had been added for insomnia and to improve appetite, will discontinue for now in light of trouble waking up this morning and c/o nausea     #debility  Lovenox SQ cleared by GI, Nutrition eval.

## 2019-05-17 NOTE — PROGRESS NOTE ADULT - SUBJECTIVE AND OBJECTIVE BOX
CHIEF COMPLAINT/ROS: Patient reports she is very tired this morning due to sleeping pill given last night. She also reports it made her nauseous.     HISTORY OF PRESENT ILLNESS  60 yo female with PMHx of MVP, chronic SDH, interstitial lung disease/pneumothorax, pulmonary nodules, meniere's, non hodgkins lymphoma (SCD/XRT/chemo at [facility] 2003), TIA, tachycardia, occipital neuralgia, and neuropathy.  Patient was initially admitted to [place] on 3/13/19 with acute onset of b/l UE weakness associated with nausea, blurry vision, and HA. CT head showed chronic B/L frontal SDH. The rest of the workup was negative. CVA/TIA r/out.  Pt was discharged home but on 3/17 had apparent seizure.  She was admitted to [facility] and intubated and placed on depakote.  CTA H/N, MRI, EEG unremarkable.  ID Recommended LP for fever; family deferred.  Transferred to [facility] on 3/19/19.  MRI brain showed cerebral ischemia. The rest of the workup was negative, this including CSF studies. EEG neg seizures. (Evaluated by lymphoma specialist/onco, PET 4/5/19 r/o recurrence of lymphoma. Increased signal at base of tongue to follow up outpt ENT). Additionally, course complicated with pneumothorax, pulmonary consulted, no interventions, self resolved.  Tachycardia - per pulm, related related to chronic lung disease.  GI consulted for rectal pain; diagnosed c anal fissure/constipation, bowel regimen started. KUB 4/12/19 No stool, no free air, no distended loops of small bowel, fibroids. Refused further workup.   Other issues while admitted:  Hypotension - home diltiazem held per cardio  dizziness - sporadic, self resolving.  Pt deferred MRI.  May be 2/2 to hx of Meniere's disease.      *TTE 3/18/19 EF 60%, mod-severe MR.   MRI brain 3/29/19 cortically based restricted diffusion within R>L frontoparietal region as well as additional scattered small foci, may represent evolution of a recent ischemia vs improving encephalitis. Patient medically optimized and cleared for discharge to acute rehab at [facility] on 4/19/19.   At [facility] patient presented with rectal pain, fluctuating tachycardia 110-150 at rest and orthostatic hypotension. GI, hematology, cardiology and pulmonary evaluation requested. Rectal bleeding developed in the morning of 4/21/19 with severe hypotension and tachycardia. RRT called. Patient transferred to ICU. Patient's course in ICU reviewed and discussed with staff. Investigations, current lab results and recent treatments also reviewed and discussed. Patient was attempted to be evaluated by PT twice but unable to tolerate evaluation due to severe tachycardia up to 150 at rest and symptomatic orthostatic hypotension as low as 80s. Patient is preparing for colonoscopy today to evaluate the source of rectal bleed. S/p multiple PRBC transfusions. Presently on IV antibiotics and IV steroids. (09 May 2019 12:11)      PAST MEDICAL & SURGICAL HISTORY:  Interstitial lung disease: on home o2 prn  NHL (non-Hodgkin's lymphoma): Agem 45 sp chemo/rt/stem cell  Transient cerebral ischemia, unspecified type  Mitral prolapse  History of tonsillectomy  History of appendectomy          REVIEW OF SYMPTOMS  [] Constitutional WNL     [X] Cardio WNL            [X] Resp WNL               [X]  WNL                   [X] Heme WNL              [X] Endo WNL                         [] MSK WNL             [] Cognitive WNL        [] Psych WNL  Patient reports she is very tired this morning due to sleeping pill given last night. She also reports it made her nauseous. Denies HA, SOB, CP, adnominal pain.     VITALS  Vital Signs Last 24 Hrs  T(C): 36.6 (17 May 2019 07:37), Max: 36.7 (16 May 2019 19:54)  T(F): 97.8 (17 May 2019 07:37), Max: 98 (16 May 2019 19:54)  HR: 125 (17 May 2019 09:15) (89 - 125)  BP: 124/84 (17 May 2019 07:37) (112/72 - 124/84)  RR: 15 (17 May 2019 07:37) (14 - 15)  SpO2: 97% (17 May 2019 09:15) (96% - 98%)      PHYSICAL EXAM  Constitutional - Frail  HEENT - NCAT, EOMI  Neck - Supple, No limited ROM  Chest -CTA  Cardiovascular - RRR, S1S2, No murmurs  Abdomen - BS+, Soft, NTND  Extremities - No C/C/E, No calf tenderness   Skin-no rash      Neurologic Exam - no new deficits.                 Balance - impaired     Psychiatric - Anxious, depressed    FUNCTIONAL PROGRESS  Gait - 6ft c mod A  ADLs - mod A  Transfers - mod A  Functional transfer - mod A    RECENT LABS              05-17    141  |  102  |  17  ----------------------------<  70  3.7   |  29  |  0.60    Ca    9.7      17 May 2019 05:00    TPro  6.1  /  Alb  2.4<L>  /  TBili  0.2  /  DBili  x   /  AST  20  /  ALT  15  /  AlkPhos  81  05-17                        11.3   6.22  )-----------( 495      ( 17 May 2019 05:00 )             35.4                        Valproic Acid Level, Serum: 38 ug/mL (05-10-19 @ 14:31)  Valproic Acid Level, Serum: 60 ug/mL (04-22-19 @ 13:40)            RADIOLOGY/OTHER RESULTS      CURRENT MEDICATIONS  MEDICATIONS  (STANDING):  aspirin enteric coated 81 milliGRAM(s) Oral daily  buDESOnide  80 MICROgram(s)/formoterol 4.5 MICROgram(s) Inhaler 2 Puff(s) Inhalation two times a day  cyanocobalamin 1000 MICROGram(s) Oral daily  enoxaparin Injectable 40 milliGRAM(s) SubCutaneous daily  lidocaine   Patch 1 Patch Transdermal <User Schedule>  lubiprostone 8 MICROGram(s) Oral two times a day  metoprolol tartrate 25 milliGRAM(s) Oral two times a day  mirtazapine 15 milliGRAM(s) Oral at bedtime  pantoprazole    Tablet 40 milliGRAM(s) Oral before breakfast  tiotropium 18 MICROgram(s) Capsule 1 Capsule(s) Inhalation daily  valproic acid 250 milliGRAM(s) Oral every 12 hours  zinc oxide 20% Ointment 1 Application(s) Topical daily    MEDICATIONS  (PRN):  acetaminophen   Tablet .. 650 milliGRAM(s) Oral every 6 hours PRN Moderate Pain (4 - 6)  ALPRAZolam 0.25 milliGRAM(s) Oral three times a day PRN anxiety  docusate sodium 100 milliGRAM(s) Oral daily PRN Constipation  ondansetron    Tablet 4 milliGRAM(s) Oral every 6 hours PRN Nausea      ASSESSMENT & PLAN          GI/Bowel Management - Amitiza-refuses   Management - Toilet Q2  Skin - Turn Q2  Pain - Tylenol PRN  DVT PPX - Lovenox     Continue comprehensive acute rehab program consisting of 3hrs/day of OT/PT and SLP.

## 2019-05-17 NOTE — PROGRESS NOTE ADULT - SUBJECTIVE AND OBJECTIVE BOX
Patient is a 59 year old  Female who presents with a chief complaint of debility/functional deficits (16 May 2019 13:46)    Patient seen and examined, has fatigue.     MEDICATIONS  (STANDING):  aspirin enteric coated 81 milliGRAM(s) Oral daily  buDESOnide  80 MICROgram(s)/formoterol 4.5 MICROgram(s) Inhaler 2 Puff(s) Inhalation two times a day  cyanocobalamin 1000 MICROGram(s) Oral daily  enoxaparin Injectable 40 milliGRAM(s) SubCutaneous daily  lidocaine   Patch 1 Patch Transdermal <User Schedule>  lubiprostone 8 MICROGram(s) Oral two times a day  metoprolol tartrate 25 milliGRAM(s) Oral two times a day  mirtazapine 15 milliGRAM(s) Oral at bedtime  pantoprazole    Tablet 40 milliGRAM(s) Oral before breakfast  tiotropium 18 MICROgram(s) Capsule 1 Capsule(s) Inhalation daily  valproic acid 250 milliGRAM(s) Oral every 12 hours  zinc oxide 20% Ointment 1 Application(s) Topical daily    MEDICATIONS  (PRN):  acetaminophen   Tablet .. 650 milliGRAM(s) Oral every 6 hours PRN Moderate Pain (4 - 6)  ALPRAZolam 0.25 milliGRAM(s) Oral three times a day PRN anxiety  docusate sodium 100 milliGRAM(s) Oral daily PRN Constipation  ondansetron    Tablet 4 milliGRAM(s) Oral every 6 hours PRN Nausea      REVIEW OF SYSTEMS:  CONSTITUTIONAL: No fever, weight loss, has fatigue  EYES: No eye pain, visual disturbances, or discharge  ENMT:  No difficulty hearing, tinnitus, vertigo; No sinus or throat pain  NECK: No pain or stiffness  RESPIRATORY: No cough, wheezing, chills or hemoptysis; intermittent shortness of breath  CARDIOVASCULAR: No chest pain, palpitations, dizziness, or leg swelling  GASTROINTESTINAL: No abdominal or epigastric pain, no nausea, no vomiting, or hematemesis; No diarrhea, has constipation, rectal pain. No melena or hematochezia.  GENITOURINARY: No dysuria, frequency, hematuria, or incontinence  NEUROLOGICAL: No headaches, memory loss, has loss of strength, and numbness, no tremors  SKIN: No itching, burning, rashes, or lesions   ENDOCRINE: No heat or cold intolerance; No hair loss  MUSCULOSKELETAL: No joint pain or swelling; No muscle pain, has back pain  PSYCHIATRIC: has depression, anxiety, mood swings, difficulty sleeping  HEME/LYMPH: No easy bruising, or bleeding gums  ALLERGY AND IMMUNOLOGIC: No hives or eczema      PHYSICAL EXAM:  T(C): 36.6 (05-17-19 @ 07:37), Max: 36.7 (05-16-19 @ 19:54)  HR: 125 (05-17-19 @ 09:15) (89 - 125)  BP: 124/84 (05-17-19 @ 07:37) (112/72 - 124/84)  RR: 15 (05-17-19 @ 07:37) (14 - 15)  SpO2: 97% (05-17-19 @ 09:15) (96% - 98%)      GENERAL: NAD  HEAD:  Atraumatic, Normocephalic  EYES: EOMI, PERRL, conjunctiva and sclera clear  ENMT:  Moist mucous membranes, Good dentition, No lesions  NECK: Supple, No JVD, Normal thyroid  NERVOUS SYSTEM:  Alert & Oriented X3, no new deficit  CHEST/LUNG: Clear to ascultation bilaterally; No rales, rhonchi, wheezing, or rubs  HEART: Regular rate and rhythm; has murmurs, no rubs, or gallops  ABDOMEN: Soft, Nontender, Nondistended; Bowel sounds present  EXTREMITIES:  2+ Peripheral Pulses, No clubbing, cyanosis, or edema  SKIN: No rash  PSYCH - emotional, depressed, anxious    LABS:                        11.3   6.22  )-----------( 495      ( 17 May 2019 05:00 )             35.4     05-17    141  |  102  |  17  ----------------------------<  70  3.7   |  29  |  0.60    Ca    9.7      17 May 2019 05:00    TPro  6.1  /  Alb  2.4<L>  /  TBili  0.2  /  DBili  x   /  AST  20  /  ALT  15  /  AlkPhos  81  05-17      RADIOLOGY & ADDITIONAL TESTS:      Consultant(s) Notes Reviewed:  [x] YES  [ ] NO    Care Discussed with Consultants/Other Providers [x] YES  [ ] NO

## 2019-05-17 NOTE — PROGRESS NOTE ADULT - ATTENDING COMMENTS
Slept well on Remeron, but sedates in the morning, will reduce dose to 7.5 mg at bedtime.  Stable neurological exam  Continue to refuse therapy and some medications.  Discharge plan discussed with SE, team, [facility] placement iis in progress.

## 2019-05-18 PROCEDURE — 99232 SBSQ HOSP IP/OBS MODERATE 35: CPT | Mod: GC

## 2019-05-18 RX ADMIN — PREGABALIN 1000 MICROGRAM(S): 225 CAPSULE ORAL at 12:56

## 2019-05-18 RX ADMIN — Medication 650 MILLIGRAM(S): at 11:00

## 2019-05-18 RX ADMIN — Medication 25 MILLIGRAM(S): at 06:14

## 2019-05-18 RX ADMIN — ENOXAPARIN SODIUM 40 MILLIGRAM(S): 100 INJECTION SUBCUTANEOUS at 12:56

## 2019-05-18 RX ADMIN — Medication 650 MILLIGRAM(S): at 22:48

## 2019-05-18 RX ADMIN — TIOTROPIUM BROMIDE 1 CAPSULE(S): 18 CAPSULE ORAL; RESPIRATORY (INHALATION) at 09:06

## 2019-05-18 RX ADMIN — Medication 650 MILLIGRAM(S): at 23:45

## 2019-05-18 RX ADMIN — ZINC OXIDE 1 APPLICATION(S): 200 OINTMENT TOPICAL at 12:58

## 2019-05-18 RX ADMIN — PANTOPRAZOLE SODIUM 40 MILLIGRAM(S): 20 TABLET, DELAYED RELEASE ORAL at 06:14

## 2019-05-18 RX ADMIN — Medication 650 MILLIGRAM(S): at 16:49

## 2019-05-18 RX ADMIN — Medication 650 MILLIGRAM(S): at 17:19

## 2019-05-18 RX ADMIN — BUDESONIDE AND FORMOTEROL FUMARATE DIHYDRATE 2 PUFF(S): 160; 4.5 AEROSOL RESPIRATORY (INHALATION) at 09:06

## 2019-05-18 RX ADMIN — Medication 650 MILLIGRAM(S): at 10:13

## 2019-05-18 RX ADMIN — Medication 250 MILLIGRAM(S): at 18:39

## 2019-05-18 NOTE — PROGRESS NOTE ADULT - SUBJECTIVE AND OBJECTIVE BOX
Cc: Gait dysfunction    HPI: Patient with no new medical issues today.  Pain controlled, no chest pain, no N/V, no Fevers/Chills. No other new ROS  Has been tolerating rehabilitation program.    acetaminophen   Tablet .. 650 milliGRAM(s) Oral every 6 hours PRN  ALPRAZolam 0.25 milliGRAM(s) Oral three times a day PRN  aspirin enteric coated 81 milliGRAM(s) Oral daily  buDESOnide  80 MICROgram(s)/formoterol 4.5 MICROgram(s) Inhaler 2 Puff(s) Inhalation two times a day  cyanocobalamin 1000 MICROGram(s) Oral daily  docusate sodium 100 milliGRAM(s) Oral daily PRN  enoxaparin Injectable 40 milliGRAM(s) SubCutaneous daily  lidocaine   Patch 1 Patch Transdermal <User Schedule>  lubiprostone 8 MICROGram(s) Oral two times a day  metoprolol tartrate 25 milliGRAM(s) Oral two times a day  mirtazapine 7.5 milliGRAM(s) Oral at bedtime  ondansetron    Tablet 4 milliGRAM(s) Oral every 6 hours PRN  pantoprazole    Tablet 40 milliGRAM(s) Oral before breakfast  tiotropium 18 MICROgram(s) Capsule 1 Capsule(s) Inhalation daily  valproic acid 250 milliGRAM(s) Oral every 12 hours  zinc oxide 20% Ointment 1 Application(s) Topical daily      T(C): 36.7 (05-18-19 @ 09:53), Max: 36.7 (05-18-19 @ 09:53)  HR: 88 (05-18-19 @ 09:53) (81 - 98)  BP: 103/67 (05-18-19 @ 09:53) (103/67 - 121/81)  RR: 14 (05-18-19 @ 09:53) (14 - 14)  SpO2: 99% (05-18-19 @ 09:53) (97% - 99%)    In NAD  HEENT- EOMI  Heart- RRR, S1S2  Lungs- CTA bl.  Abd- + BS, NT  Ext- No calf pain  Neuro- Exam unchanged          Imp: Patient with diagnosis of quadraparesis  admitted for comprehensive acute rehabilitation.    Plan:  - Continue PT/OT/SLP  - DVT prophylaxis  - Skin- Turn q2h, check skin daily  - Continue current medications; patient medically stable.   -Active issues- none  - Patient is stable to continue current rehabilitation program.

## 2019-05-19 PROCEDURE — 99232 SBSQ HOSP IP/OBS MODERATE 35: CPT | Mod: GC

## 2019-05-19 PROCEDURE — 93010 ELECTROCARDIOGRAM REPORT: CPT

## 2019-05-19 RX ADMIN — Medication 650 MILLIGRAM(S): at 11:30

## 2019-05-19 RX ADMIN — TIOTROPIUM BROMIDE 1 CAPSULE(S): 18 CAPSULE ORAL; RESPIRATORY (INHALATION) at 09:09

## 2019-05-19 RX ADMIN — Medication 650 MILLIGRAM(S): at 12:00

## 2019-05-19 RX ADMIN — Medication 250 MILLIGRAM(S): at 16:50

## 2019-05-19 RX ADMIN — ENOXAPARIN SODIUM 40 MILLIGRAM(S): 100 INJECTION SUBCUTANEOUS at 11:26

## 2019-05-19 RX ADMIN — Medication 650 MILLIGRAM(S): at 22:13

## 2019-05-19 RX ADMIN — Medication 250 MILLIGRAM(S): at 05:33

## 2019-05-19 RX ADMIN — Medication 650 MILLIGRAM(S): at 05:33

## 2019-05-19 RX ADMIN — Medication 650 MILLIGRAM(S): at 23:32

## 2019-05-19 RX ADMIN — Medication 650 MILLIGRAM(S): at 06:30

## 2019-05-19 RX ADMIN — BUDESONIDE AND FORMOTEROL FUMARATE DIHYDRATE 2 PUFF(S): 160; 4.5 AEROSOL RESPIRATORY (INHALATION) at 09:09

## 2019-05-19 RX ADMIN — PANTOPRAZOLE SODIUM 40 MILLIGRAM(S): 20 TABLET, DELAYED RELEASE ORAL at 05:34

## 2019-05-19 NOTE — PROGRESS NOTE ADULT - SUBJECTIVE AND OBJECTIVE BOX
Cc: Gait dysfunction    HPI: Patient with no new medical issues today.  Pain controlled, no chest pain, no N/V, no Fevers/Chills. No other new ROS  Has been tolerating rehabilitation program.    MEDICATIONS  (STANDING):  aspirin enteric coated 81 milliGRAM(s) Oral daily  buDESOnide  80 MICROgram(s)/formoterol 4.5 MICROgram(s) Inhaler 2 Puff(s) Inhalation two times a day  cyanocobalamin 1000 MICROGram(s) Oral daily  enoxaparin Injectable 40 milliGRAM(s) SubCutaneous daily  lidocaine   Patch 1 Patch Transdermal <User Schedule>  lubiprostone 8 MICROGram(s) Oral two times a day  metoprolol tartrate 25 milliGRAM(s) Oral two times a day  mirtazapine 7.5 milliGRAM(s) Oral at bedtime  pantoprazole    Tablet 40 milliGRAM(s) Oral before breakfast  tiotropium 18 MICROgram(s) Capsule 1 Capsule(s) Inhalation daily  valproic acid 250 milliGRAM(s) Oral every 12 hours  zinc oxide 20% Ointment 1 Application(s) Topical daily    MEDICATIONS  (PRN):  acetaminophen   Tablet .. 650 milliGRAM(s) Oral every 6 hours PRN Moderate Pain (4 - 6)  ALPRAZolam 0.25 milliGRAM(s) Oral three times a day PRN anxiety  docusate sodium 100 milliGRAM(s) Oral daily PRN Constipation  ondansetron    Tablet 4 milliGRAM(s) Oral every 6 hours PRN Nausea    Vital Signs Last 24 Hrs  T(C): 36.8 (19 May 2019 07:16), Max: 36.8 (19 May 2019 07:16)  T(F): 98.2 (19 May 2019 07:16), Max: 98.2 (19 May 2019 07:16)  HR: 79 (19 May 2019 07:16) (79 - 96)  BP: 112/75 (19 May 2019 07:16) (103/67 - 112/75)  BP(mean): --  RR: 14 (19 May 2019 07:16) (14 - 14)  SpO2: 97% (19 May 2019 07:16) (97% - 99%)      In NAD  HEENT- EOMI  Heart- RRR, S1S2  Lungs- CTA bl.  Abd- + BS, NT  Ext- No calf pain  Neuro- Exam unchanged          Imp: Patient with diagnosis of quadraparesis  admitted for comprehensive acute rehabilitation.    Plan:  - Continue PT/OT/SLP  - DVT prophylaxis  - Skin- Turn q2h, check skin daily  - Continue current medications; patient medically stable.   -Active issues- none  - Patient is stable to continue current rehabilitation program.

## 2019-05-20 LAB
ALBUMIN SERPL ELPH-MCNC: 2.3 G/DL — LOW (ref 3.3–5)
ALP SERPL-CCNC: 72 U/L — SIGNIFICANT CHANGE UP (ref 40–120)
ALT FLD-CCNC: 17 U/L DA — SIGNIFICANT CHANGE UP (ref 10–45)
ANION GAP SERPL CALC-SCNC: 6 MMOL/L — SIGNIFICANT CHANGE UP (ref 5–17)
AST SERPL-CCNC: 16 U/L — SIGNIFICANT CHANGE UP (ref 10–40)
BILIRUB SERPL-MCNC: 0.2 MG/DL — SIGNIFICANT CHANGE UP (ref 0.2–1.2)
BUN SERPL-MCNC: 16 MG/DL — SIGNIFICANT CHANGE UP (ref 7–23)
CALCIUM SERPL-MCNC: 9.1 MG/DL — SIGNIFICANT CHANGE UP (ref 8.4–10.5)
CHLORIDE SERPL-SCNC: 103 MMOL/L — SIGNIFICANT CHANGE UP (ref 96–108)
CO2 SERPL-SCNC: 30 MMOL/L — SIGNIFICANT CHANGE UP (ref 22–31)
CREAT SERPL-MCNC: 0.64 MG/DL — SIGNIFICANT CHANGE UP (ref 0.5–1.3)
GLUCOSE SERPL-MCNC: 77 MG/DL — SIGNIFICANT CHANGE UP (ref 70–99)
HCT VFR BLD CALC: 34.9 % — SIGNIFICANT CHANGE UP (ref 34.5–45)
HGB BLD-MCNC: 10.8 G/DL — LOW (ref 11.5–15.5)
MCHC RBC-ENTMCNC: 29.3 PG — SIGNIFICANT CHANGE UP (ref 27–34)
MCHC RBC-ENTMCNC: 30.9 GM/DL — LOW (ref 32–36)
MCV RBC AUTO: 94.8 FL — SIGNIFICANT CHANGE UP (ref 80–100)
NRBC # BLD: 0 /100 WBCS — SIGNIFICANT CHANGE UP (ref 0–0)
PLATELET # BLD AUTO: 395 K/UL — SIGNIFICANT CHANGE UP (ref 150–400)
POTASSIUM SERPL-MCNC: 3.8 MMOL/L — SIGNIFICANT CHANGE UP (ref 3.5–5.3)
POTASSIUM SERPL-SCNC: 3.8 MMOL/L — SIGNIFICANT CHANGE UP (ref 3.5–5.3)
PROT SERPL-MCNC: 5.7 G/DL — LOW (ref 6–8.3)
RBC # BLD: 3.68 M/UL — LOW (ref 3.8–5.2)
RBC # FLD: 15 % — HIGH (ref 10.3–14.5)
SODIUM SERPL-SCNC: 139 MMOL/L — SIGNIFICANT CHANGE UP (ref 135–145)
WBC # BLD: 9.06 K/UL — SIGNIFICANT CHANGE UP (ref 3.8–10.5)
WBC # FLD AUTO: 9.06 K/UL — SIGNIFICANT CHANGE UP (ref 3.8–10.5)

## 2019-05-20 PROCEDURE — 99232 SBSQ HOSP IP/OBS MODERATE 35: CPT

## 2019-05-20 RX ADMIN — PREGABALIN 1000 MICROGRAM(S): 225 CAPSULE ORAL at 11:04

## 2019-05-20 RX ADMIN — Medication 250 MILLIGRAM(S): at 05:01

## 2019-05-20 RX ADMIN — Medication 650 MILLIGRAM(S): at 19:30

## 2019-05-20 RX ADMIN — Medication 650 MILLIGRAM(S): at 10:54

## 2019-05-20 RX ADMIN — Medication 650 MILLIGRAM(S): at 18:19

## 2019-05-20 RX ADMIN — Medication 650 MILLIGRAM(S): at 12:09

## 2019-05-20 RX ADMIN — ENOXAPARIN SODIUM 40 MILLIGRAM(S): 100 INJECTION SUBCUTANEOUS at 11:04

## 2019-05-20 RX ADMIN — Medication 25 MILLIGRAM(S): at 05:01

## 2019-05-20 RX ADMIN — PANTOPRAZOLE SODIUM 40 MILLIGRAM(S): 20 TABLET, DELAYED RELEASE ORAL at 05:01

## 2019-05-20 RX ADMIN — Medication 250 MILLIGRAM(S): at 21:00

## 2019-05-20 RX ADMIN — ZINC OXIDE 1 APPLICATION(S): 200 OINTMENT TOPICAL at 11:04

## 2019-05-20 NOTE — PROGRESS NOTE ADULT - ASSESSMENT
60 yo RHWF female with complex medical history  s/p suspected multiple  CVAs vs. encephalitis   being readmitted to acute rehabilitation  unit  with spastic quadriparesis, more pronounced in left UE, sensory impairment, gait impairment  and functional deficits.    #r/o CVA  ASA continues c GI ppx    #Leukocytosis  -resolved, afebrile, ID and Medicine are following.  -will continue to monitor off ABx, non toxic.     #tachycardia  -Metoprolol BID.     #ILD  -Pulmicort, Spiriva, pulmonary evaluation placed  to Dr Maharaj, pending consult  -on home O2 PRN.     #Seizure  Continue Depakote, seizure ppx    #Rectal pain/hx of bleed  GI is following, case discussed, will follow recommendations, Amitiza continues.     # Anxiety  -Xanax as needed  -Psychiatry evaluation  -Remeron had been added for insomnia and to improve appetite, will discontinue for now in light of trouble waking up this morning and c/o nausea     #debility  Lovenox SQ cleared by GI, Nutrition eval.

## 2019-05-20 NOTE — PROGRESS NOTE ADULT - SUBJECTIVE AND OBJECTIVE BOX
Patient is a 59y old  Female who presents with a chief complaint of debility/functional deficits (19 May 2019 09:06)    Patient seen and examined at bedside. pt appears anxious. complains of poor sleep last night. pt complains of tremors in b/l UE (for 2 days) which she attributes to nebs treatment and refuses to take any nebs this am. RT was at bedside during my exam.     of note, pt is seen taking breakfast using both hands w/o any tremors. stat vitals were stable. HR ~70's    ALLERGIES:  IV Contrast (Anaphylaxis)  shellfish. (Anaphylaxis)    MEDICATIONS  (STANDING):  aspirin enteric coated 81 milliGRAM(s) Oral daily  buDESOnide  80 MICROgram(s)/formoterol 4.5 MICROgram(s) Inhaler 2 Puff(s) Inhalation two times a day  cyanocobalamin 1000 MICROGram(s) Oral daily  enoxaparin Injectable 40 milliGRAM(s) SubCutaneous daily  lidocaine   Patch 1 Patch Transdermal <User Schedule>  lubiprostone 8 MICROGram(s) Oral two times a day  metoprolol tartrate 25 milliGRAM(s) Oral two times a day  mirtazapine 7.5 milliGRAM(s) Oral at bedtime  pantoprazole    Tablet 40 milliGRAM(s) Oral before breakfast  tiotropium 18 MICROgram(s) Capsule 1 Capsule(s) Inhalation daily  valproic acid 250 milliGRAM(s) Oral every 12 hours  zinc oxide 20% Ointment 1 Application(s) Topical daily    MEDICATIONS  (PRN):  acetaminophen   Tablet .. 650 milliGRAM(s) Oral every 6 hours PRN Moderate Pain (4 - 6)  ALPRAZolam 0.25 milliGRAM(s) Oral three times a day PRN anxiety  docusate sodium 100 milliGRAM(s) Oral daily PRN Constipation  ondansetron    Tablet 4 milliGRAM(s) Oral every 6 hours PRN Nausea    Vital Signs Last 24 Hrs  T(F): 98.1 (20 May 2019 07:44), Max: 98.2 (19 May 2019 21:13)  HR: 78 (20 May 2019 08:45) (70 - 85)  BP: 120/83 (20 May 2019 07:44) (100/68 - 122/77)  RR: 14 (20 May 2019 07:44) (14 - 14)  SpO2: 98% (20 May 2019 08:45) (98% - 99%)  I&O's Summary    19 May 2019 07:01  -  20 May 2019 07:00  --------------------------------------------------------  IN: 600 mL / OUT: 0 mL / NET: 600 mL    20 May 2019 07:01  -  20 May 2019 11:59  --------------------------------------------------------  IN: 300 mL / OUT: 0 mL / NET: 300 mL        PHYSICAL EXAM:  General: NAD, A/O x 3. appears anxious. malnourished cachectic.   ENT: MMM  Neck: Supple, No JVD  Lungs: Clear to auscultation bilaterally. poor effort in breathing. respiration symmetrical. no wheezing/rales noted. on RA, satting >95%   Cardio: RRR, S1/S2, No murmurs  Abdomen: Soft, Nontender, Nondistended; Bowel sounds present  Extremities: No calf tenderness, No pitting edema    LABS:                        10.8   9.06  )-----------( 395      ( 20 May 2019 06:20 )             34.9       05-20    139  |  103  |  16  ----------------------------<  77  3.8   |  30  |  0.64    Ca    9.1      20 May 2019 06:20    TPro  5.7  /  Alb  2.3  /  TBili  0.2  /  DBili  x   /  AST  16  /  ALT  17  /  AlkPhos  72  05-20     eGFR if Non African American: 96 mL/min/1.73M2 (05-20-19 @ 06:20)  eGFR if African American: 111 mL/min/1.73M2 (05-20-19 @ 06:20)    03-13 Chol 193 mg/dL  mg/dL HDL 62 mg/dL Trig 112 mg/dL      Care Discussed with patient and primary team.

## 2019-05-20 NOTE — CHART NOTE - NSCHARTNOTEFT_GEN_A_CORE
Nutrition Follow Up Note  Hospital Course (Per Electronic Medical Record):   Source: Medical Record [X] Patient [X] Nursing Staff [X]     Diet:  Regular Lacto-Ovo vegetarian , Ensure pudding TID  Patient tolerating her diet consuming % currently receiving PO nutrition supplements due to evidence of Severe malnutrition , however patient no longer receptive to receiving them . D/C order pending to be signed , made NP aware.    Current Weight: (5/16) 118.1/43.6kg, patient with ? 6lb weight change will request follow up weight.    Pertinent Medications: MEDICATIONS  (STANDING):  aspirin enteric coated 81 milliGRAM(s) Oral daily  buDESOnide  80 MICROgram(s)/formoterol 4.5 MICROgram(s) Inhaler 2 Puff(s) Inhalation two times a day  cyanocobalamin 1000 MICROGram(s) Oral daily  enoxaparin Injectable 40 milliGRAM(s) SubCutaneous daily  lidocaine   Patch 1 Patch Transdermal <User Schedule>  lubiprostone 8 MICROGram(s) Oral two times a day  metoprolol tartrate 25 milliGRAM(s) Oral two times a day  mirtazapine 7.5 milliGRAM(s) Oral at bedtime  pantoprazole    Tablet 40 milliGRAM(s) Oral before breakfast  tiotropium 18 MICROgram(s) Capsule 1 Capsule(s) Inhalation daily  valproic acid 250 milliGRAM(s) Oral every 12 hours  zinc oxide 20% Ointment 1 Application(s) Topical daily    MEDICATIONS  (PRN):  acetaminophen   Tablet .. 650 milliGRAM(s) Oral every 6 hours PRN Moderate Pain (4 - 6)  ALPRAZolam 0.25 milliGRAM(s) Oral three times a day PRN anxiety  docusate sodium 100 milliGRAM(s) Oral daily PRN Constipation  ondansetron    Tablet 4 milliGRAM(s) Oral every 6 hours PRN Nausea      Pertinent Labs:  05-20 Na139 mmol/L Glu 77 mg/dL K+ 3.8 mmol/L Cr  0.64 mg/dL BUN 16 mg/dL 05-20 Alb 2.3 g/dL<L>        Skin: intact    Edema: None    Last BM: on (5/19)    Estimated Needs:   [X] No Change since Previous Assessment  [X] Recalculated:     Previous Nutrition Diagnosis: Severe Malnutrition    Nutrition Diagnosis is [X] Ongoing, encourage po intake.         New Nutrition Diagnosis: [X] Not Applicable    Interventions:   1. Recommend continue current diet  2. Provide patient's preferences due to vegetarian diet.    Monitoring & Evaluation: will monitor:  [X] Weights   [X] PO Intake   [X] Follow Up (Per Protocol)  [X] Tolerance to Diet Prescription       RD to follow as per Nutrition protocol  Ayana Grider RDN

## 2019-05-20 NOTE — PROGRESS NOTE ADULT - ASSESSMENT
59 year old female s/p suspected CVA vs. encephalitis admitted to rehab with left-sided weakness and functional deficits.     #r/o CVA  continue ASA as stroke prophylasix    #Tachycardia - resolved  -HR acceptable  -continue metoprolol BID, monitor HR closely    #ILD  c/w Pulmicort Xopenex Spiriva  pt is on home O2 PRN.     #Seizure  Continue Depakote, seizure ppx    #constipation/ rectal pain  GI following for nausea and rectal pain, bowel regimen for constipation    #back pain  Tylenol and lidocaine patch.     #Vitamin B12 deficiency  patient reports she is vegetarian  continue cyanocobalamin 1000 MICROGram(s) Oral daily    # anxiety, insomnia  -c/w Xanax as needed  -Remeron had been added for insomnia and to improve appetite, will decrease dose for now in light of trouble waking up this morning and c/o nausea   -Psychiatry evaluation    # GERD  c/w continue pantoprazole     #DVT ppx  c/w sq Lovenox

## 2019-05-20 NOTE — PROGRESS NOTE ADULT - ATTENDING COMMENTS
No new complaints, stable neurological exam.  Multidisciplinary team meeting today:  patient's functional goals and needs, functional and clinical  progress were discussed, barriers to discharge were identified. Anticipate discharge home with home care, 24/7 supervision for safety.     EDOD 5-31-19

## 2019-05-20 NOTE — PROGRESS NOTE ADULT - SUBJECTIVE AND OBJECTIVE BOX
CHIEF COMPLAINT: Insomnia.       HISTORY OF PRESENT ILLNESS  58 yo female with PMHx of MVP, chronic SDH, interstitial lung disease/pneumothorax, pulmonary nodules, meniere's, non hodgkins lymphoma (SCD/XRT/chemo at [facility] 2003), TIA, tachycardia, occipital neuralgia, and neuropathy.  Patient was initially admitted to [facility] on 3/13/19 with acute onset of b/l UE weakness associated with nausea, blurry vision, and HA. CT head showed chronic B/L frontal SDH. The rest of the workup was negative. CVA/TIA r/out.  Pt was discharged home but on 3/17 had apparent seizure.  She was admitted to [facility] and intubated and placed on depakote.  CTA H/N, MRI, EEG unremarkable.  ID Recommended LP for fever; family deferred.  Transferred to [facility] on 3/19/19.  MRI brain showed cerebral ischemia. The rest of the workup was negative, this including CSF studies. EEG neg seizures. (Evaluated by lymphoma specialist/onco, PET 4/5/19 r/o recurrence of lymphoma. Increased signal at base of tongue to follow up outpt ENT). Additionally, course complicated with pneumothorax, pulmonary consulted, no interventions, self resolved.  Tachycardia - per pulm, related related to chronic lung disease.  GI consulted for rectal pain; diagnosed c anal fissure/constipation, bowel regimen started. KUB 4/12/19 No stool, no free air, no distended loops of small bowel, fibroids. Refused further workup.   Other issues while admitted:  Hypotension - home diltiazem held per cardio  dizziness - sporadic, self resolving.  Pt deferred MRI.  May be 2/2 to hx of Meniere's disease.      *TTE 3/18/19 EF 60%, mod-severe MR.   MRI brain 3/29/19 cortically based restricted diffusion within R>L frontoparietal region as well as additional scattered small foci, may represent evolution of a recent ischemia vs improving encephalitis. Patient medically optimized and cleared for discharge to acute rehab at [facility] on 4/19/19.   At [facility] patient presented with rectal pain, fluctuating tachycardia 110-150 at rest and orthostatic hypotension. GI, hematology, cardiology and pulmonary evaluation requested. Rectal bleeding developed in the morning of 4/21/19 with severe hypotension and tachycardia. RRT called. Patient transferred to ICU. Patient's course in ICU reviewed and discussed with staff. Investigations, current lab results and recent treatments also reviewed and discussed. Patient was attempted to be evaluated by PT twice but unable to tolerate evaluation due to severe tachycardia up to 150 at rest and symptomatic orthostatic hypotension as low as 80s. Patient is preparing for colonoscopy today to evaluate the source of rectal bleed. S/p multiple PRBC transfusions. Presently on IV antibiotics and IV steroids. (09 May 2019 12:11)      PAST MEDICAL & SURGICAL HISTORY:  Interstitial lung disease: on home o2 prn  NHL (non-Hodgkin's lymphoma): Agem 43 sp chemo/rt/stem cell  Transient cerebral ischemia, unspecified type  Mitral prolapse  History of tonsillectomy  History of appendectomy       REVIEW OF SYMPTOMS  [X] Constitutional WNL     [X] Cardio WNL            [X] Resp WNL                      [X]  WNL                   [X] Heme WNL              [X] Endo WNL                     [X] Skin WNL                 [X] MSK WNL                           [X] Cognitive WNL         VITALS  Vital Signs Last 24 Hrs  T(C): 36.7 (20 May 2019 07:44), Max: 36.8 (19 May 2019 21:13)  T(F): 98.1 (20 May 2019 07:44), Max: 98.2 (19 May 2019 21:13)  HR: 78 (20 May 2019 08:45) (70 - 85)  BP: 120/83 (20 May 2019 07:44) (100/68 - 122/77)  BP(mean): --  RR: 14 (20 May 2019 07:44) (14 - 14)  SpO2: 98% (20 May 2019 08:45) (98% - 99%)      PHYSICAL EXAM  Constitutional - Frail  HEENT - NCAT, EOMI  Neck - Supple, No limited ROM  Chest -CTA  Cardiovascular - RRR, S1S2, No murmurs  Abdomen - BS+, Soft, NTND  Extremities - No C/C/E, No calf tenderness   Skin-no rash      Neurologic Exam - no new deficits.                 Balance - impaired     Psychiatric - Anxious, depressed    FUNCTIONAL PROGRESS  Gait - mod A  ADLs - mod A  Transfers - mod A  Functional transfer - mod A      RECENT LABS                        10.8   9.06  )-----------( 395      ( 20 May 2019 06:20 )             34.9     05-20    139  |  103  |  16  ----------------------------<  77  3.8   |  30  |  0.64    Ca    9.1      20 May 2019 06:20    TPro  5.7<L>  /  Alb  2.3<L>  /  TBili  0.2  /  DBili  x   /  AST  16  /  ALT  17  /  AlkPhos  72  05-20      LIVER FUNCTIONS - ( 20 May 2019 06:20 )  Alb: 2.3 g/dL / Pro: 5.7 g/dL / ALK PHOS: 72 U/L / ALT: 17 U/L DA / AST: 16 U/L / GGT: x                 Valproic Acid Level, Serum: 38 ug/mL (05-10-19 @ 14:31)  Valproic Acid Level, Serum: 60 ug/mL (04-22-19 @ 13:40)            RADIOLOGY/OTHER RESULTS      CURRENT MEDICATIONS  MEDICATIONS  (STANDING):  aspirin enteric coated 81 milliGRAM(s) Oral daily  buDESOnide  80 MICROgram(s)/formoterol 4.5 MICROgram(s) Inhaler 2 Puff(s) Inhalation two times a day  cyanocobalamin 1000 MICROGram(s) Oral daily  enoxaparin Injectable 40 milliGRAM(s) SubCutaneous daily  lidocaine   Patch 1 Patch Transdermal <User Schedule>  lubiprostone 8 MICROGram(s) Oral two times a day  metoprolol tartrate 25 milliGRAM(s) Oral two times a day  mirtazapine 7.5 milliGRAM(s) Oral at bedtime  pantoprazole    Tablet 40 milliGRAM(s) Oral before breakfast  tiotropium 18 MICROgram(s) Capsule 1 Capsule(s) Inhalation daily  valproic acid 250 milliGRAM(s) Oral every 12 hours  zinc oxide 20% Ointment 1 Application(s) Topical daily    MEDICATIONS  (PRN):  acetaminophen   Tablet .. 650 milliGRAM(s) Oral every 6 hours PRN Moderate Pain (4 - 6)  ALPRAZolam 0.25 milliGRAM(s) Oral three times a day PRN anxiety  docusate sodium 100 milliGRAM(s) Oral daily PRN Constipation  ondansetron    Tablet 4 milliGRAM(s) Oral every 6 hours PRN Nausea      ASSESSMENT & PLAN      GI/Bowel Management - Amitiza   Management - Toilet Q2  Skin - Turn Q2  Pain - Tylenol PRN  DVT PPX - Lovenox       Continue comprehensive acute rehab program consisting of 3hrs/day of OT/PT and SLP.

## 2019-05-21 PROCEDURE — 99232 SBSQ HOSP IP/OBS MODERATE 35: CPT

## 2019-05-21 PROCEDURE — 99233 SBSQ HOSP IP/OBS HIGH 50: CPT

## 2019-05-21 RX ADMIN — PREGABALIN 1000 MICROGRAM(S): 225 CAPSULE ORAL at 11:53

## 2019-05-21 RX ADMIN — Medication 650 MILLIGRAM(S): at 07:46

## 2019-05-21 RX ADMIN — ZINC OXIDE 1 APPLICATION(S): 200 OINTMENT TOPICAL at 11:53

## 2019-05-21 RX ADMIN — Medication 650 MILLIGRAM(S): at 14:15

## 2019-05-21 RX ADMIN — Medication 650 MILLIGRAM(S): at 01:17

## 2019-05-21 RX ADMIN — Medication 650 MILLIGRAM(S): at 15:15

## 2019-05-21 RX ADMIN — ENOXAPARIN SODIUM 40 MILLIGRAM(S): 100 INJECTION SUBCUTANEOUS at 11:53

## 2019-05-21 RX ADMIN — Medication 650 MILLIGRAM(S): at 08:46

## 2019-05-21 RX ADMIN — Medication 250 MILLIGRAM(S): at 18:04

## 2019-05-21 RX ADMIN — PANTOPRAZOLE SODIUM 40 MILLIGRAM(S): 20 TABLET, DELAYED RELEASE ORAL at 06:03

## 2019-05-21 RX ADMIN — Medication 650 MILLIGRAM(S): at 01:40

## 2019-05-21 RX ADMIN — Medication 250 MILLIGRAM(S): at 05:25

## 2019-05-21 NOTE — PROGRESS NOTE ADULT - ATTENDING COMMENTS
Better energy and mood today.  Neurologically stable  Discharge plan discussed with patient, SW  Labs in the morning

## 2019-05-21 NOTE — PROGRESS NOTE ADULT - ASSESSMENT
60 yo RHWF female with complex medical history  s/p suspected multiple  CVAs vs. encephalitis being readmitted to acute rehabilitation unit  with spastic quadriparesis, more pronounced in left UE, sensory impairment, gait impairment  and functional deficits.    #r/o CVA  ASA continues c GI ppx    #Leukocytosis  -resolved, afebrile, ID and Medicine are following.  -will continue to monitor off ABx, non toxic.     #tachycardia  -Metoprolol BID. Tachycardia well controlled.     #ILD  -Pulmicort, Spiriva, pulmonary evaluation completed.  -on home O2 PRN.     #Seizure  Continue Depakote, seizure ppx.     #Rectal pain/hx of bleed  GI is following, case discussed, will follow recommendations, Amitiza continues.     # Anxiety  -Xanax as needed  -Psychiatry evaluation  -Remeron had been added for insomnia and to improve appetite    #debility  Lovenox SQ cleared by GI, Nutrition eval.

## 2019-05-21 NOTE — PROGRESS NOTE ADULT - ASSESSMENT
59 year old female s/p suspected CVA vs. encephalitis admitted to rehab with left-sided weakness and functional deficits.     #r/o CVA  continue ASA  GI ppx    #Tachycardia - resolved  continue metoprolol BID, monitor closely    #ILD  pulmicort, xopenex, spiriva  on home O2 PRN.     #Seizure  Continue Depakote, seizure ppx, f/u neurology    #constipation/ rectal pain  GI following for nausea and rectal pain, bowel regimen for constipation    #back pain  Tylenol and lidocaine patch.     #Vitamin B12 deficiency  patient reports she is vegetarian  continue cyanocobalamin 1000 MICROGram(s) Oral daily      # anxiety  -Xanax as needed  -Remeron had been added for insomnia and to improve appetite, will decrease dose for now in light of trouble waking up this morning and c/o nausea   -Psychiatry evaluation    # GERD  continue pantoprazole     #DVT ppx  Lovenox

## 2019-05-21 NOTE — PROGRESS NOTE ADULT - SUBJECTIVE AND OBJECTIVE BOX
CHIEF COMPLAINT: Offers no complaints, NAD.       HISTORY OF PRESENT ILLNESS  60 yo female with PMHx of MVP, chronic SDH, interstitial lung disease/pneumothorax, pulmonary nodules, meniere's, non hodgkins lymphoma (SCD/XRT/chemo at [facility] 2003), TIA, tachycardia, occipital neuralgia, and neuropathy.  Patient was initially admitted to [facility] on 3/13/19 with acute onset of b/l UE weakness associated with nausea, blurry vision, and HA. CT head showed chronic B/L frontal SDH. The rest of the workup was negative. CVA/TIA r/out.  Pt was discharged home but on 3/17 had apparent seizure.  She was admitted to [facility] and intubated and placed on depakote.  CTA H/N, MRI, EEG unremarkable.  ID Recommended LP for fever; family deferred.  Transferred to [facility] on 3/19/19.  MRI brain showed cerebral ischemia. The rest of the workup was negative, this including CSF studies. EEG neg seizures. (Evaluated by lymphoma specialist/onco, PET 4/5/19 r/o recurrence of lymphoma. Increased signal at base of tongue to follow up outpt ENT). Additionally, course complicated with pneumothorax, pulmonary consulted, no interventions, self resolved.  Tachycardia - per pulm, related related to chronic lung disease.  GI consulted for rectal pain; diagnosed c anal fissure/constipation, bowel regimen started. KUB 4/12/19 No stool, no free air, no distended loops of small bowel, fibroids. Refused further workup.   Other issues while admitted:  Hypotension - home diltiazem held per cardio  dizziness - sporadic, self resolving.  Pt deferred MRI.  May be 2/2 to hx of Meniere's disease.      *TTE 3/18/19 EF 60%, mod-severe MR.   MRI brain 3/29/19 cortically based restricted diffusion within R>L frontoparietal region as well as additional scattered small foci, may represent evolution of a recent ischemia vs improving encephalitis. Patient medically optimized and cleared for discharge to acute rehab at [facility] on 4/19/19.   At [facility] patient presented with rectal pain, fluctuating tachycardia 110-150 at rest and orthostatic hypotension. GI, hematology, cardiology and pulmonary evaluation requested. Rectal bleeding developed in the morning of 4/21/19 with severe hypotension and tachycardia. RRT called. Patient transferred to ICU. Patient's course in ICU reviewed and discussed with staff. Investigations, current lab results and recent treatments also reviewed and discussed. Patient was attempted to be evaluated by PT twice but unable to tolerate evaluation due to severe tachycardia up to 150 at rest and symptomatic orthostatic hypotension as low as 80s. Patient is preparing for colonoscopy today to evaluate the source of rectal bleed. S/p multiple PRBC transfusions. Presently on IV antibiotics and IV steroids. (09 May 2019 12:11)      PAST MEDICAL & SURGICAL HISTORY:  Interstitial lung disease: on home o2 prn  NHL (non-Hodgkin's lymphoma): Agem 45 sp chemo/rt/stem cell  Transient cerebral ischemia, unspecified type  Mitral prolapse  History of tonsillectomy  History of appendectomy       REVIEW OF SYMPTOMS  [X] Constitutional WNL     [X] Cardio WNL            [X] Resp WNL           [X] GI WNL                          [X]  WNL                   [X] Heme WNL              [X] Endo WNL                     [X] Skin WNL                 [X] MSK WNL                VITALS  Vital Signs Last 24 Hrs  T(C): 36.4 (21 May 2019 08:37), Max: 36.4 (21 May 2019 08:37)  T(F): 97.6 (21 May 2019 08:37), Max: 97.6 (21 May 2019 08:37)  HR: 85 (21 May 2019 08:37) (82 - 85)  BP: 109/72 (21 May 2019 08:37) (104/72 - 109/72)  BP(mean): --  RR: 14 (21 May 2019 08:37) (14 - 14)  SpO2: 95% (21 May 2019 09:49) (95% - 99%)      PHYSICAL EXAM  Constitutional - NAD  HEENT - NCAT, EOMI  Neck - Supple, No limited ROM  Chest - No wheeze, No rhonchi, No crackles  Cardiovascular - RRR, S1S2, No murmurs  Abdomen - BS+, Soft, NTND  Extremities - No C/C/E, No calf tenderness   Skin-no rash      Neurologic Exam - no new deficits this am.                    Psychiatric - Mood improved today, Affect WNL       FUNCTIONAL PROGRESS  Gait - mod A  ADLs - mod A  Transfers - mod A  Functional transfer - mod A      RECENT LABS                        10.8   9.06  )-----------( 395      ( 20 May 2019 06:20 )             34.9     05-20    139  |  103  |  16  ----------------------------<  77  3.8   |  30  |  0.64    Ca    9.1      20 May 2019 06:20    TPro  5.7<L>  /  Alb  2.3<L>  /  TBili  0.2  /  DBili  x   /  AST  16  /  ALT  17  /  AlkPhos  72  05-20      LIVER FUNCTIONS - ( 20 May 2019 06:20 )  Alb: 2.3 g/dL / Pro: 5.7 g/dL / ALK PHOS: 72 U/L / ALT: 17 U/L DA / AST: 16 U/L / GGT: x                 Valproic Acid Level, Serum: 38 ug/mL (05-10-19 @ 14:31)  Valproic Acid Level, Serum: 60 ug/mL (04-22-19 @ 13:40)            RADIOLOGY/OTHER RESULTS      CURRENT MEDICATIONS  MEDICATIONS  (STANDING):  aspirin enteric coated 81 milliGRAM(s) Oral daily  buDESOnide  80 MICROgram(s)/formoterol 4.5 MICROgram(s) Inhaler 2 Puff(s) Inhalation two times a day  cyanocobalamin 1000 MICROGram(s) Oral daily  enoxaparin Injectable 40 milliGRAM(s) SubCutaneous daily  lidocaine   Patch 1 Patch Transdermal <User Schedule>  lubiprostone 8 MICROGram(s) Oral two times a day  metoprolol tartrate 25 milliGRAM(s) Oral two times a day  mirtazapine 7.5 milliGRAM(s) Oral at bedtime  pantoprazole    Tablet 40 milliGRAM(s) Oral before breakfast  tiotropium 18 MICROgram(s) Capsule 1 Capsule(s) Inhalation daily  valproic acid 250 milliGRAM(s) Oral every 12 hours  zinc oxide 20% Ointment 1 Application(s) Topical daily    MEDICATIONS  (PRN):  acetaminophen   Tablet .. 650 milliGRAM(s) Oral every 6 hours PRN Moderate Pain (4 - 6)  ALPRAZolam 0.25 milliGRAM(s) Oral three times a day PRN anxiety  docusate sodium 100 milliGRAM(s) Oral daily PRN Constipation  ondansetron    Tablet 4 milliGRAM(s) Oral every 6 hours PRN Nausea      ASSESSMENT & PLAN        GI/Bowel Management - Amitiza   Management - Toilet Q2  Skin - Turn Q2  Pain - Tylenol PRN  DVT PPX - Lovenox     Continue comprehensive acute rehab program consisting of 3hrs/day of OT/PT and SLP.

## 2019-05-21 NOTE — PROGRESS NOTE ADULT - SUBJECTIVE AND OBJECTIVE BOX
Patient is a 59y old  Female who presents with a chief complaint of debility/functional deficits (21 May 2019 10:54)        MEDICATIONS  (STANDING):  aspirin enteric coated 81 milliGRAM(s) Oral daily  buDESOnide  80 MICROgram(s)/formoterol 4.5 MICROgram(s) Inhaler 2 Puff(s) Inhalation two times a day  cyanocobalamin 1000 MICROGram(s) Oral daily  enoxaparin Injectable 40 milliGRAM(s) SubCutaneous daily  lidocaine   Patch 1 Patch Transdermal <User Schedule>  lubiprostone 8 MICROGram(s) Oral two times a day  metoprolol tartrate 25 milliGRAM(s) Oral two times a day  mirtazapine 7.5 milliGRAM(s) Oral at bedtime  pantoprazole    Tablet 40 milliGRAM(s) Oral before breakfast  tiotropium 18 MICROgram(s) Capsule 1 Capsule(s) Inhalation daily  valproic acid 250 milliGRAM(s) Oral every 12 hours  zinc oxide 20% Ointment 1 Application(s) Topical daily    MEDICATIONS  (PRN):  acetaminophen   Tablet .. 650 milliGRAM(s) Oral every 6 hours PRN Moderate Pain (4 - 6)  ALPRAZolam 0.25 milliGRAM(s) Oral three times a day PRN anxiety  docusate sodium 100 milliGRAM(s) Oral daily PRN Constipation  ondansetron    Tablet 4 milliGRAM(s) Oral every 6 hours PRN Nausea      REVIEW OF SYSTEMS:  CONSTITUTIONAL: No fever, weight loss, or fatigue  EYES: No eye pain, visual disturbances, or discharge  ENMT:  No difficulty hearing, tinnitus, vertigo; No sinus or throat pain  NECK: No pain or stiffness  RESPIRATORY: No cough, wheezing, chills or hemoptysis; No shortness of breath  CARDIOVASCULAR: No chest pain, palpitations, dizziness, or leg swelling  GASTROINTESTINAL: No abdominal or epigastric pain. No nausea, vomiting, or hematemesis; No diarrhea or constipation. No melena or hematochezia.  GENITOURINARY: No dysuria, frequency, hematuria, or incontinence  NEUROLOGICAL: No headaches, memory loss, loss of strength, numbness, or tremors  SKIN: No itching, burning, rashes, or lesions   LYMPH NODES: No enlarged glands  ENDOCRINE: No heat or cold intolerance; No hair loss  MUSCULOSKELETAL: No joint pain or swelling; No muscle, back, or extremity pain  PSYCHIATRIC: No depression, anxiety, mood swings, or difficulty sleeping  HEME/LYMPH: No easy bruising, or bleeding gums  ALLERY AND IMMUNOLOGIC: No hives or eczema    PHYSICAL EXAM:    T(C): 36.4 (05-21-19 @ 08:37), Max: 36.4 (05-21-19 @ 08:37)  HR: 85 (05-21-19 @ 08:37) (82 - 85)  BP: 109/72 (05-21-19 @ 08:37) (104/72 - 109/72)  RR: 14 (05-21-19 @ 08:37) (14 - 14)  SpO2: 95% (05-21-19 @ 09:49) (95% - 99%)  Wt(kg): --  I&O's Summary    20 May 2019 07:01  -  21 May 2019 07:00  --------------------------------------------------------  IN: 540 mL / OUT: 0 mL / NET: 540 mL        GENERAL: NAD, well-groomed, well-developed  HEAD:  Atraumatic, Normocephalic  EYES: EOMI, PERRL, conjunctiva and sclera clear  ENMT: No tonsillar erythema, exudates, or enlargement; Moist mucous membranes, Good dentition, No lesions  NECK: Supple, No JVD, Normal thyroid  NERVOUS SYSTEM:  Alert & Oriented X3, Good concentration; Motor Strength 5/5 B/L upper and lower extremities; DTRs 2+ intact and symmetric  CHEST/LUNG: Clear to ascultation bilaterally; No rales, rhonchi, wheezing, or rubs  HEART: Regular rate and rhythm; No murmurs, rubs, or gallops  ABDOMEN: Soft, Nontender, Nondistended; Bowel sounds present  EXTREMITIES:  2+ Peripheral Pulses, No clubbing, cyanosis, or edema  LYMPH: No lymphadenopathy noted  SKIN: No rashes or lesions    LABS:                        10.8   9.06  )-----------( 395      ( 20 May 2019 06:20 )             34.9     05-20    139  |  103  |  16  ----------------------------<  77  3.8   |  30  |  0.64    Ca    9.1      20 May 2019 06:20    TPro  5.7<L>  /  Alb  2.3<L>  /  TBili  0.2  /  DBili  x   /  AST  16  /  ALT  17  /  AlkPhos  72  05-20        RADIOLOGY & ADDITIONAL TESTS:    Consultant(s) Notes Reviewed:  [x] YES  [ ] NO    Care Discussed with Consultants/Other Providers [x] YES  [ ] NO Patient is a 59 year old female who presents with a chief complaint of debility/functional deficits (21 May 2019 10:54)    Patient seen and examined is anxious      MEDICATIONS  (STANDING):  aspirin enteric coated 81 milliGRAM(s) Oral daily  buDESOnide  80 MICROgram(s)/formoterol 4.5 MICROgram(s) Inhaler 2 Puff(s) Inhalation two times a day  cyanocobalamin 1000 MICROGram(s) Oral daily  enoxaparin Injectable 40 milliGRAM(s) SubCutaneous daily  lidocaine   Patch 1 Patch Transdermal <User Schedule>  lubiprostone 8 MICROGram(s) Oral two times a day  metoprolol tartrate 25 milliGRAM(s) Oral two times a day  mirtazapine 7.5 milliGRAM(s) Oral at bedtime  pantoprazole    Tablet 40 milliGRAM(s) Oral before breakfast  tiotropium 18 MICROgram(s) Capsule 1 Capsule(s) Inhalation daily  valproic acid 250 milliGRAM(s) Oral every 12 hours  zinc oxide 20% Ointment 1 Application(s) Topical daily    MEDICATIONS  (PRN):  acetaminophen   Tablet .. 650 milliGRAM(s) Oral every 6 hours PRN Moderate Pain (4 - 6)  ALPRAZolam 0.25 milliGRAM(s) Oral three times a day PRN anxiety  docusate sodium 100 milliGRAM(s) Oral daily PRN Constipation  ondansetron    Tablet 4 milliGRAM(s) Oral every 6 hours PRN Nausea      REVIEW OF SYSTEMS:  CONSTITUTIONAL: No fever, weight loss, has fatigue  EYES: No eye pain, visual disturbances, or discharge  ENMT:  No difficulty hearing, tinnitus, vertigo; No sinus or throat pain  NECK: No pain or stiffness  RESPIRATORY: No cough, wheezing, chills or hemoptysis; intermittent shortness of breath  CARDIOVASCULAR: No chest pain, palpitations, dizziness, or leg swelling  GASTROINTESTINAL: No abdominal or epigastric pain, no nausea, no vomiting, or hematemesis; No diarrhea, has constipation, rectal pain. No melena or hematochezia.  GENITOURINARY: No dysuria, frequency, hematuria, or incontinence  NEUROLOGICAL: No headaches, memory loss, has loss of strength, and numbness, no tremors  SKIN: No itching, burning, rashes, or lesions   ENDOCRINE: No heat or cold intolerance; No hair loss  MUSCULOSKELETAL: No joint pain or swelling; No muscle pain, has back pain  PSYCHIATRIC: has depression, anxiety, mood swings, difficulty sleeping  HEME/LYMPH: No easy bruising, or bleeding gums  ALLERGY AND IMMUNOLOGIC: No hives or eczema        PHYSICAL EXAM:    T(C): 36.4 (05-21-19 @ 08:37), Max: 36.4 (05-21-19 @ 08:37)  HR: 85 (05-21-19 @ 08:37) (82 - 85)  BP: 109/72 (05-21-19 @ 08:37) (104/72 - 109/72)  RR: 14 (05-21-19 @ 08:37) (14 - 14)  SpO2: 95% (05-21-19 @ 09:49) (95% - 99%)    I&O's Summary    20 May 2019 07:01  -  21 May 2019 07:00  --------------------------------------------------------  IN: 540 mL / OUT: 0 mL / NET: 540 mL        GENERAL: NAD  HEAD:  Atraumatic, Normocephalic  EYES: EOMI, PERRL, conjunctiva and sclera clear  ENMT:  Moist mucous membranes, Good dentition, No lesions  NECK: Supple, No JVD, Normal thyroid  NERVOUS SYSTEM:  Alert & Oriented X3, no new deficit  CHEST/LUNG: Clear to ascultation bilaterally; No rales, rhonchi, wheezing, or rubs  HEART: Regular rate and rhythm; has murmurs, no rubs, or gallops  ABDOMEN: Soft, Nontender, Nondistended; Bowel sounds present  EXTREMITIES:  2+ Peripheral Pulses, No clubbing, cyanosis, or edema  SKIN: No rash  PSYCH - emotional, depressed, anxious    LABS:                        10.8   9.06  )-----------( 395      ( 20 May 2019 06:20 )             34.9     05-20    139  |  103  |  16  ----------------------------<  77  3.8   |  30  |  0.64    Ca    9.1      20 May 2019 06:20    TPro  5.7<L>  /  Alb  2.3<L>  /  TBili  0.2  /  DBili  x   /  AST  16  /  ALT  17  /  AlkPhos  72  05-20        RADIOLOGY & ADDITIONAL TESTS:    Consultant(s) Notes Reviewed:  [x] YES  [ ] NO    Care Discussed with Consultants/Other Providers [x] YES  [ ] NO

## 2019-05-22 LAB
ALBUMIN SERPL ELPH-MCNC: 2.5 G/DL — LOW (ref 3.3–5)
ALP SERPL-CCNC: 73 U/L — SIGNIFICANT CHANGE UP (ref 40–120)
ALT FLD-CCNC: 18 U/L DA — SIGNIFICANT CHANGE UP (ref 10–45)
ANION GAP SERPL CALC-SCNC: 8 MMOL/L — SIGNIFICANT CHANGE UP (ref 5–17)
AST SERPL-CCNC: 19 U/L — SIGNIFICANT CHANGE UP (ref 10–40)
BILIRUB SERPL-MCNC: 0.1 MG/DL — LOW (ref 0.2–1.2)
BUN SERPL-MCNC: 20 MG/DL — SIGNIFICANT CHANGE UP (ref 7–23)
CALCIUM SERPL-MCNC: 9.4 MG/DL — SIGNIFICANT CHANGE UP (ref 8.4–10.5)
CHLORIDE SERPL-SCNC: 105 MMOL/L — SIGNIFICANT CHANGE UP (ref 96–108)
CO2 SERPL-SCNC: 30 MMOL/L — SIGNIFICANT CHANGE UP (ref 22–31)
CREAT SERPL-MCNC: 0.57 MG/DL — SIGNIFICANT CHANGE UP (ref 0.5–1.3)
GLUCOSE SERPL-MCNC: 89 MG/DL — SIGNIFICANT CHANGE UP (ref 70–99)
HCT VFR BLD CALC: 34.3 % — LOW (ref 34.5–45)
HGB BLD-MCNC: 10.8 G/DL — LOW (ref 11.5–15.5)
MCHC RBC-ENTMCNC: 29.3 PG — SIGNIFICANT CHANGE UP (ref 27–34)
MCHC RBC-ENTMCNC: 31.5 GM/DL — LOW (ref 32–36)
MCV RBC AUTO: 93 FL — SIGNIFICANT CHANGE UP (ref 80–100)
NRBC # BLD: 0 /100 WBCS — SIGNIFICANT CHANGE UP (ref 0–0)
PLATELET # BLD AUTO: 444 K/UL — HIGH (ref 150–400)
POTASSIUM SERPL-MCNC: 4.3 MMOL/L — SIGNIFICANT CHANGE UP (ref 3.5–5.3)
POTASSIUM SERPL-SCNC: 4.3 MMOL/L — SIGNIFICANT CHANGE UP (ref 3.5–5.3)
PROT SERPL-MCNC: 6.2 G/DL — SIGNIFICANT CHANGE UP (ref 6–8.3)
RBC # BLD: 3.69 M/UL — LOW (ref 3.8–5.2)
RBC # FLD: 15.2 % — HIGH (ref 10.3–14.5)
SODIUM SERPL-SCNC: 143 MMOL/L — SIGNIFICANT CHANGE UP (ref 135–145)
WBC # BLD: 7.79 K/UL — SIGNIFICANT CHANGE UP (ref 3.8–10.5)
WBC # FLD AUTO: 7.79 K/UL — SIGNIFICANT CHANGE UP (ref 3.8–10.5)

## 2019-05-22 PROCEDURE — 99232 SBSQ HOSP IP/OBS MODERATE 35: CPT

## 2019-05-22 RX ORDER — ACETAMINOPHEN 500 MG
650 TABLET ORAL ONCE
Refills: 0 | Status: COMPLETED | OUTPATIENT
Start: 2019-05-22 | End: 2019-05-22

## 2019-05-22 RX ORDER — ONDANSETRON 8 MG/1
4 TABLET, FILM COATED ORAL EVERY 6 HOURS
Refills: 0 | Status: DISCONTINUED | OUTPATIENT
Start: 2019-05-22 | End: 2019-06-06

## 2019-05-22 RX ADMIN — ENOXAPARIN SODIUM 40 MILLIGRAM(S): 100 INJECTION SUBCUTANEOUS at 11:45

## 2019-05-22 RX ADMIN — ZINC OXIDE 1 APPLICATION(S): 200 OINTMENT TOPICAL at 11:44

## 2019-05-22 RX ADMIN — Medication 25 MILLIGRAM(S): at 18:38

## 2019-05-22 RX ADMIN — Medication 650 MILLIGRAM(S): at 18:38

## 2019-05-22 RX ADMIN — PREGABALIN 1000 MICROGRAM(S): 225 CAPSULE ORAL at 11:44

## 2019-05-22 RX ADMIN — Medication 650 MILLIGRAM(S): at 23:28

## 2019-05-22 RX ADMIN — Medication 650 MILLIGRAM(S): at 22:44

## 2019-05-22 RX ADMIN — Medication 650 MILLIGRAM(S): at 08:20

## 2019-05-22 RX ADMIN — Medication 650 MILLIGRAM(S): at 19:15

## 2019-05-22 RX ADMIN — Medication 250 MILLIGRAM(S): at 18:37

## 2019-05-22 RX ADMIN — ONDANSETRON 4 MILLIGRAM(S): 8 TABLET, FILM COATED ORAL at 22:44

## 2019-05-22 RX ADMIN — Medication 650 MILLIGRAM(S): at 01:50

## 2019-05-22 RX ADMIN — PANTOPRAZOLE SODIUM 40 MILLIGRAM(S): 20 TABLET, DELAYED RELEASE ORAL at 06:03

## 2019-05-22 RX ADMIN — Medication 250 MILLIGRAM(S): at 05:14

## 2019-05-22 RX ADMIN — Medication 650 MILLIGRAM(S): at 01:30

## 2019-05-22 NOTE — PROGRESS NOTE ADULT - ASSESSMENT
60 yo RHWF female with complex medical history  s/p suspected multiple  CVAs vs. encephalitis being readmitted to acute rehabilitation unit  with spastic quadriparesis, more pronounced in left UE, sensory impairment, gait impairment  and functional deficits.    #r/o CVA  ASA continues c GI ppx    #Leukocytosis  -resolved, afebrile  -Medicine are following.  -will continue to monitor off ABx, non toxic.     #tachycardia  -Metoprolol BID. Tachycardia well controlled.     #ILD  -Pulmicort, Spiriva, pulmonary evaluation completed.  -on home O2 PRN.     #Seizure  Continue Depakote, seizure ppx.   No SZ    #Rectal pain/hx of bleed  GI is following, case discussed, will follow recommendations, Amitiza     # Anxiety  -Xanax as needed  -Psychiatry evaluation  -Remeron had been added for insomnia and to improve appetite    #debility  Lovenox SQ cleared by GI, Nutrition eval.

## 2019-05-22 NOTE — PROGRESS NOTE ADULT - SUBJECTIVE AND OBJECTIVE BOX
CHIEF COMPLAINT: better sleep and mood, participates in therapy.      HISTORY OF PRESENT ILLNESS    60 yo female with PMHx of MVP, chronic SDH, interstitial lung disease/pneumothorax, pulmonary nodules, meniere's, non hodgkins lymphoma (SCD/XRT/chemo at [facility] 2003), TIA, tachycardia, occipital neuralgia, and neuropathy.  Patient was initially admitted to [facility] on 3/13/19 with acute onset of b/l UE weakness associated with nausea, blurry vision, and HA. CT head showed chronic B/L frontal SDH. The rest of the workup was negative. CVA/TIA r/out.  Pt was discharged home but on 3/17 had apparent seizure.  She was admitted to [facility] and intubated and placed on depakote.  CTA H/N, MRI, EEG unremarkable.  ID Recommended LP for fever; family deferred.  Transferred to [facility] on 3/19/19.  MRI brain showed cerebral ischemia. The rest of the workup was negative, this including CSF studies. EEG neg seizures. (Evaluated by lymphoma specialist/onco, PET 4/5/19 r/o recurrence of lymphoma. Increased signal at base of tongue to follow up outpt ENT). Additionally, course complicated with pneumothorax, pulmonary consulted, no interventions, self resolved.  Tachycardia - per pulm, related related to chronic lung disease.  GI consulted for rectal pain; diagnosed c anal fissure/constipation, bowel regimen started. KUB 4/12/19 No stool, no free air, no distended loops of small bowel, fibroids. Refused further workup.   Other issues while admitted:  Hypotension - home diltiazem held per cardio  dizziness - sporadic, self resolving.  Pt deferred MRI.  May be 2/2 to hx of Meniere's disease.      *TTE 3/18/19 EF 60%, mod-severe MR.   MRI brain 3/29/19 cortically based restricted diffusion within R>L frontoparietal region as well as additional scattered small foci, may represent evolution of a recent ischemia vs improving encephalitis. Patient medically optimized and cleared for discharge to acute rehab at [facility] on 4/19/19.   At [facility] patient presented with rectal pain, fluctuating tachycardia 110-150 at rest and orthostatic hypotension. GI, hematology, cardiology and pulmonary evaluation requested. Rectal bleeding developed in the morning of 4/21/19 with severe hypotension and tachycardia. RRT called. Patient transferred to ICU. Patient's course in ICU reviewed and discussed with staff. Investigations, current lab results and recent treatments also reviewed and discussed. Patient was attempted to be evaluated by PT twice but unable to tolerate evaluation due to severe tachycardia up to 150 at rest and symptomatic orthostatic hypotension as low as 80s. Patient is preparing for colonoscopy today to evaluate the source of rectal bleed. S/p multiple PRBC transfusions. Presently on IV antibiotics and IV steroids. (09 May 2019 12:11)        PAST MEDICAL & SURGICAL HISTORY:  Interstitial lung disease: on home o2 prn  NHL (non-Hodgkin's lymphoma): Agem 45 sp chemo/rt/stem cell  Transient cerebral ischemia, unspecified type  Mitral prolapse  History of tonsillectomy  History of appendectomy      VITALS  Vital Signs Last 24 Hrs  T(C): 36.9 (22 May 2019 07:55), Max: 36.9 (22 May 2019 07:55)  T(F): 98.5 (22 May 2019 07:55), Max: 98.5 (22 May 2019 07:55)  HR: 91 (22 May 2019 07:55) (70 - 91)  BP: 119/74 (22 May 2019 07:55) (102/75 - 119/74)  BP(mean): --  RR: 14 (22 May 2019 07:55) (14 - 14)  SpO2: 99% (22 May 2019 07:55) (98% - 99%)      PHYSICAL EXAM  Constitutional - NAD, Comfortable  HEENT - NCAT, EOMI  Neck - Supple, No limited ROM  Chest - CTA bilaterally,  Cardiovascular - RRR, S1S2, No murmurs  Abdomen - BS+, Soft, NTND  Extremities - No C/C/E, No calf tenderness   Skin-no rash  Neurologic Exam -  no new focal deficits                     RECENT LABS                        10.8   7.79  )-----------( 444      ( 22 May 2019 06:05 )             34.3     05-22    143  |  105  |  20  ----------------------------<  89  4.3   |  30  |  0.57    Ca    9.4      22 May 2019 06:05    TPro  6.2  /  Alb  2.5<L>  /  TBili  0.1<L>  /  DBili  x   /  AST  19  /  ALT  18  /  AlkPhos  73  05-22      LIVER FUNCTIONS - ( 22 May 2019 06:05 )  Alb: 2.5 g/dL / Pro: 6.2 g/dL / ALK PHOS: 73 U/L / ALT: 18 U/L DA / AST: 19 U/L / GGT: x                 Valproic Acid Level, Serum: 38 ug/mL (05-10-19 @ 14:31)  Valproic Acid Level, Serum: 60 ug/mL (04-22-19 @ 13:40)                RADIOLOGY/OTHER RESULTS      CURRENT MEDICATIONS  MEDICATIONS  (STANDING):  aspirin enteric coated 81 milliGRAM(s) Oral daily  buDESOnide  80 MICROgram(s)/formoterol 4.5 MICROgram(s) Inhaler 2 Puff(s) Inhalation two times a day  cyanocobalamin 1000 MICROGram(s) Oral daily  enoxaparin Injectable 40 milliGRAM(s) SubCutaneous daily  lidocaine   Patch 1 Patch Transdermal <User Schedule>  lubiprostone 8 MICROGram(s) Oral two times a day  metoprolol tartrate 25 milliGRAM(s) Oral two times a day  mirtazapine 7.5 milliGRAM(s) Oral at bedtime  pantoprazole    Tablet 40 milliGRAM(s) Oral before breakfast  tiotropium 18 MICROgram(s) Capsule 1 Capsule(s) Inhalation daily  valproic acid 250 milliGRAM(s) Oral every 12 hours  zinc oxide 20% Ointment 1 Application(s) Topical daily    MEDICATIONS  (PRN):  acetaminophen   Tablet .. 650 milliGRAM(s) Oral every 6 hours PRN Moderate Pain (4 - 6)  docusate sodium 100 milliGRAM(s) Oral daily PRN Constipation  ondansetron    Tablet 4 milliGRAM(s) Oral every 6 hours PRN Nausea

## 2019-05-23 PROCEDURE — 99232 SBSQ HOSP IP/OBS MODERATE 35: CPT

## 2019-05-23 RX ADMIN — TIOTROPIUM BROMIDE 1 CAPSULE(S): 18 CAPSULE ORAL; RESPIRATORY (INHALATION) at 08:24

## 2019-05-23 RX ADMIN — Medication 250 MILLIGRAM(S): at 18:06

## 2019-05-23 RX ADMIN — ZINC OXIDE 1 APPLICATION(S): 200 OINTMENT TOPICAL at 12:31

## 2019-05-23 RX ADMIN — Medication 650 MILLIGRAM(S): at 14:21

## 2019-05-23 RX ADMIN — PANTOPRAZOLE SODIUM 40 MILLIGRAM(S): 20 TABLET, DELAYED RELEASE ORAL at 06:33

## 2019-05-23 RX ADMIN — Medication 650 MILLIGRAM(S): at 23:58

## 2019-05-23 RX ADMIN — PREGABALIN 1000 MICROGRAM(S): 225 CAPSULE ORAL at 12:32

## 2019-05-23 RX ADMIN — Medication 250 MILLIGRAM(S): at 06:33

## 2019-05-23 RX ADMIN — Medication 650 MILLIGRAM(S): at 04:55

## 2019-05-23 RX ADMIN — Medication 650 MILLIGRAM(S): at 22:33

## 2019-05-23 RX ADMIN — Medication 650 MILLIGRAM(S): at 15:21

## 2019-05-23 RX ADMIN — ENOXAPARIN SODIUM 40 MILLIGRAM(S): 100 INJECTION SUBCUTANEOUS at 12:32

## 2019-05-23 RX ADMIN — Medication 650 MILLIGRAM(S): at 04:06

## 2019-05-23 NOTE — PROGRESS NOTE ADULT - SUBJECTIVE AND OBJECTIVE BOX
CHIEF COMPLAINT: Offers no complaints this am. 'I'm fine'.      HISTORY OF PRESENT ILLNESS  60 yo female with PMHx of MVP, chronic SDH, interstitial lung disease/pneumothorax, pulmonary nodules, meniere's, non hodgkins lymphoma (SCD/XRT/chemo at [facility] 2003), TIA, tachycardia, occipital neuralgia, and neuropathy.  Patient was initially admitted to [place] on 3/13/19 with acute onset of b/l UE weakness associated with nausea, blurry vision, and HA. CT head showed chronic B/L frontal SDH. The rest of the workup was negative. CVA/TIA r/out.  Pt was discharged home but on 3/17 had apparent seizure.  She was admitted to [facility] and intubated and placed on depakote.  CTA H/N, MRI, EEG unremarkable.  ID Recommended LP for fever; family deferred.  Transferred to [facility] on 3/19/19.  MRI brain showed cerebral ischemia. The rest of the workup was negative, this including CSF studies. EEG neg seizures. (Evaluated by lymphoma specialist/onco, PET 4/5/19 r/o recurrence of lymphoma. Increased signal at base of tongue to follow up outpt ENT). Additionally, course complicated with pneumothorax, pulmonary consulted, no interventions, self resolved.  Tachycardia - per pulm, related related to chronic lung disease.  GI consulted for rectal pain; diagnosed c anal fissure/constipation, bowel regimen started. KUB 4/12/19 No stool, no free air, no distended loops of small bowel, fibroids. Refused further workup.   Other issues while admitted:  Hypotension - home diltiazem held per cardio  dizziness - sporadic, self resolving.  Pt deferred MRI.  May be 2/2 to hx of Meniere's disease.      *TTE 3/18/19 EF 60%, mod-severe MR.   MRI brain 3/29/19 cortically based restricted diffusion within R>L frontoparietal region as well as additional scattered small foci, may represent evolution of a recent ischemia vs improving encephalitis. Patient medically optimized and cleared for discharge to acute rehab at [facility] on 4/19/19.   At [facility] patient presented with rectal pain, fluctuating tachycardia 110-150 at rest and orthostatic hypotension. GI, hematology, cardiology and pulmonary evaluation requested. Rectal bleeding developed in the morning of 4/21/19 with severe hypotension and tachycardia. RRT called. Patient transferred to ICU. Patient's course in ICU reviewed and discussed with staff. Investigations, current lab results and recent treatments also reviewed and discussed. Patient was attempted to be evaluated by PT twice but unable to tolerate evaluation due to severe tachycardia up to 150 at rest and symptomatic orthostatic hypotension as low as 80s. Patient is preparing for colonoscopy today to evaluate the source of rectal bleed. S/p multiple PRBC transfusions. Presently on IV antibiotics and IV steroids. (09 May 2019 12:11)      PAST MEDICAL & SURGICAL HISTORY:  Interstitial lung disease: on home o2 prn  NHL (non-Hodgkin's lymphoma): Agem 45 sp chemo/rt/stem cell  Transient cerebral ischemia, unspecified type  Mitral prolapse  History of tonsillectomy  History of appendectomy        REVIEW OF SYMPTOMS  [X] Constitutional WNL     [X] Cardio WNL           [X] GI WNL                          [X]  WNL                   [X] Heme WNL              [X] Endo WNL                     [X] Skin WNL                 [X] MSK WNL                            [X] Cognitive WNL           VITALS  Vital Signs Last 24 Hrs  T(C): 36.6 (23 May 2019 09:50), Max: 36.6 (23 May 2019 01:29)  T(F): 97.9 (23 May 2019 09:50), Max: 97.9 (23 May 2019 09:50)  HR: 85 (23 May 2019 09:50) (82 - 85)  BP: 104/70 (23 May 2019 09:50) (104/70 - 110/78)  BP(mean): --  RR: 14 (23 May 2019 09:50) (14 - 14)  SpO2: 100% (23 May 2019 09:50) (96% - 100%)  PHYSICAL EXAM  Constitutional - NAD  HEENT - NCAT, EOMI  Neck - Supple, No limited ROM  Chest - No wheeze, No rhonchi, No crackles  Cardiovascular - RRR, S1S2, No murmurs  Abdomen - BS+, Soft, NTND  Extremities - No C/C/E, No calf tenderness   Skin-no rash      Neurologic Exam - no new deficits this am.                    Psychiatric - Mood improved today, Affect WNL       FUNCTIONAL PROGRESS  Gait - mod A  ADLs - mod A  Transfers - mod A  Functional transfer - mod A    RECENT LABS                        10.8   7.79  )-----------( 444      ( 22 May 2019 06:05 )             34.3     05-22    143  |  105  |  20  ----------------------------<  89  4.3   |  30  |  0.57    Ca    9.4      22 May 2019 06:05    TPro  6.2  /  Alb  2.5<L>  /  TBili  0.1<L>  /  DBili  x   /  AST  19  /  ALT  18  /  AlkPhos  73  05-22      LIVER FUNCTIONS - ( 22 May 2019 06:05 )  Alb: 2.5 g/dL / Pro: 6.2 g/dL / ALK PHOS: 73 U/L / ALT: 18 U/L DA / AST: 19 U/L / GGT: x                 Valproic Acid Level, Serum: 38 ug/mL (05-10-19 @ 14:31)  Valproic Acid Level, Serum: 60 ug/mL (04-22-19 @ 13:40)            RADIOLOGY/OTHER RESULTS      CURRENT MEDICATIONS  MEDICATIONS  (STANDING):  aspirin enteric coated 81 milliGRAM(s) Oral daily  buDESOnide  80 MICROgram(s)/formoterol 4.5 MICROgram(s) Inhaler 2 Puff(s) Inhalation two times a day  cyanocobalamin 1000 MICROGram(s) Oral daily  enoxaparin Injectable 40 milliGRAM(s) SubCutaneous daily  lidocaine   Patch 1 Patch Transdermal <User Schedule>  lubiprostone 8 MICROGram(s) Oral two times a day  metoprolol tartrate 25 milliGRAM(s) Oral two times a day  mirtazapine 7.5 milliGRAM(s) Oral at bedtime  pantoprazole    Tablet 40 milliGRAM(s) Oral before breakfast  tiotropium 18 MICROgram(s) Capsule 1 Capsule(s) Inhalation daily  valproic acid 250 milliGRAM(s) Oral every 12 hours  zinc oxide 20% Ointment 1 Application(s) Topical daily    MEDICATIONS  (PRN):  acetaminophen   Tablet .. 650 milliGRAM(s) Oral every 6 hours PRN Moderate Pain (4 - 6)  docusate sodium 100 milliGRAM(s) Oral daily PRN Constipation  ondansetron    Tablet 4 milliGRAM(s) Oral every 6 hours PRN Nausea and/or Vomiting  ondansetron    Tablet 4 milliGRAM(s) Oral every 6 hours PRN Nausea      ASSESSMENT & PLAN    GI/Bowel Management - Amitiza   Management - Toilet Q2  Skin - Turn Q2  Pain - Tylenol PRN  DVT PPX - Lovenox       Continue comprehensive acute rehab program consisting of 3hrs/day of OT/PT and SLP.

## 2019-05-23 NOTE — PROGRESS NOTE ADULT - ATTENDING COMMENTS
Patient medically and neurologically stable. Making progress towards rehab goals.   Continue rehab program.

## 2019-05-24 LAB
ALBUMIN SERPL ELPH-MCNC: 2.5 G/DL — LOW (ref 3.3–5)
ALP SERPL-CCNC: 74 U/L — SIGNIFICANT CHANGE UP (ref 40–120)
ALT FLD-CCNC: 16 U/L DA — SIGNIFICANT CHANGE UP (ref 10–45)
ANION GAP SERPL CALC-SCNC: 4 MMOL/L — LOW (ref 5–17)
AST SERPL-CCNC: 15 U/L — SIGNIFICANT CHANGE UP (ref 10–40)
BILIRUB SERPL-MCNC: 0.2 MG/DL — SIGNIFICANT CHANGE UP (ref 0.2–1.2)
BUN SERPL-MCNC: 17 MG/DL — SIGNIFICANT CHANGE UP (ref 7–23)
CALCIUM SERPL-MCNC: 9.1 MG/DL — SIGNIFICANT CHANGE UP (ref 8.4–10.5)
CHLORIDE SERPL-SCNC: 103 MMOL/L — SIGNIFICANT CHANGE UP (ref 96–108)
CO2 SERPL-SCNC: 34 MMOL/L — HIGH (ref 22–31)
CREAT SERPL-MCNC: 0.7 MG/DL — SIGNIFICANT CHANGE UP (ref 0.5–1.3)
GLUCOSE SERPL-MCNC: 80 MG/DL — SIGNIFICANT CHANGE UP (ref 70–99)
HCT VFR BLD CALC: 32.9 % — LOW (ref 34.5–45)
HGB BLD-MCNC: 10.4 G/DL — LOW (ref 11.5–15.5)
MCHC RBC-ENTMCNC: 29.1 PG — SIGNIFICANT CHANGE UP (ref 27–34)
MCHC RBC-ENTMCNC: 31.6 GM/DL — LOW (ref 32–36)
MCV RBC AUTO: 91.9 FL — SIGNIFICANT CHANGE UP (ref 80–100)
NRBC # BLD: 0 /100 WBCS — SIGNIFICANT CHANGE UP (ref 0–0)
PLATELET # BLD AUTO: 442 K/UL — HIGH (ref 150–400)
POTASSIUM SERPL-MCNC: 3.9 MMOL/L — SIGNIFICANT CHANGE UP (ref 3.5–5.3)
POTASSIUM SERPL-SCNC: 3.9 MMOL/L — SIGNIFICANT CHANGE UP (ref 3.5–5.3)
PROT SERPL-MCNC: 6.1 G/DL — SIGNIFICANT CHANGE UP (ref 6–8.3)
RBC # BLD: 3.58 M/UL — LOW (ref 3.8–5.2)
RBC # FLD: 15.6 % — HIGH (ref 10.3–14.5)
SODIUM SERPL-SCNC: 141 MMOL/L — SIGNIFICANT CHANGE UP (ref 135–145)
WBC # BLD: 9.95 K/UL — SIGNIFICANT CHANGE UP (ref 3.8–10.5)
WBC # FLD AUTO: 9.95 K/UL — SIGNIFICANT CHANGE UP (ref 3.8–10.5)

## 2019-05-24 PROCEDURE — 99232 SBSQ HOSP IP/OBS MODERATE 35: CPT

## 2019-05-24 PROCEDURE — 99233 SBSQ HOSP IP/OBS HIGH 50: CPT

## 2019-05-24 RX ADMIN — Medication 250 MILLIGRAM(S): at 17:45

## 2019-05-24 RX ADMIN — Medication 650 MILLIGRAM(S): at 06:08

## 2019-05-24 RX ADMIN — Medication 650 MILLIGRAM(S): at 23:41

## 2019-05-24 RX ADMIN — Medication 650 MILLIGRAM(S): at 13:28

## 2019-05-24 RX ADMIN — PANTOPRAZOLE SODIUM 40 MILLIGRAM(S): 20 TABLET, DELAYED RELEASE ORAL at 06:08

## 2019-05-24 RX ADMIN — ONDANSETRON 4 MILLIGRAM(S): 8 TABLET, FILM COATED ORAL at 13:36

## 2019-05-24 RX ADMIN — Medication 650 MILLIGRAM(S): at 05:15

## 2019-05-24 RX ADMIN — Medication 650 MILLIGRAM(S): at 22:41

## 2019-05-24 RX ADMIN — Medication 250 MILLIGRAM(S): at 08:54

## 2019-05-24 RX ADMIN — ENOXAPARIN SODIUM 40 MILLIGRAM(S): 100 INJECTION SUBCUTANEOUS at 12:58

## 2019-05-24 RX ADMIN — Medication 650 MILLIGRAM(S): at 12:58

## 2019-05-24 RX ADMIN — PREGABALIN 1000 MICROGRAM(S): 225 CAPSULE ORAL at 12:58

## 2019-05-24 NOTE — PROGRESS NOTE ADULT - SUBJECTIVE AND OBJECTIVE BOX
Patient is a 59 year old female who presents with a chief complaint of debility/functional deficits (23 May 2019 10:14)    Patient seen and examined is anxious      MEDICATIONS  (STANDING):  aspirin enteric coated 81 milliGRAM(s) Oral daily  buDESOnide  80 MICROgram(s)/formoterol 4.5 MICROgram(s) Inhaler 2 Puff(s) Inhalation two times a day  cyanocobalamin 1000 MICROGram(s) Oral daily  enoxaparin Injectable 40 milliGRAM(s) SubCutaneous daily  lidocaine   Patch 1 Patch Transdermal <User Schedule>  lubiprostone 8 MICROGram(s) Oral two times a day  metoprolol tartrate 25 milliGRAM(s) Oral two times a day  mirtazapine 7.5 milliGRAM(s) Oral at bedtime  pantoprazole    Tablet 40 milliGRAM(s) Oral before breakfast  tiotropium 18 MICROgram(s) Capsule 1 Capsule(s) Inhalation daily  valproic acid 250 milliGRAM(s) Oral every 12 hours  zinc oxide 20% Ointment 1 Application(s) Topical daily    MEDICATIONS  (PRN):  acetaminophen   Tablet .. 650 milliGRAM(s) Oral every 6 hours PRN Moderate Pain (4 - 6)  docusate sodium 100 milliGRAM(s) Oral daily PRN Constipation  ondansetron    Tablet 4 milliGRAM(s) Oral every 6 hours PRN Nausea and/or Vomiting  ondansetron    Tablet 4 milliGRAM(s) Oral every 6 hours PRN Nausea    REVIEW OF SYSTEMS:  CONSTITUTIONAL: No fever, weight loss, has fatigue  EYES: No eye pain, visual disturbances, or discharge  ENMT:  No difficulty hearing, tinnitus, vertigo; No sinus or throat pain  NECK: No pain or stiffness  RESPIRATORY: No cough, wheezing, chills or hemoptysis; intermittent shortness of breath  CARDIOVASCULAR: No chest pain, palpitations, dizziness, or leg swelling  GASTROINTESTINAL: No abdominal or epigastric pain, no nausea, no vomiting, or hematemesis; No diarrhea, has constipation, rectal pain. No melena or hematochezia.  GENITOURINARY: No dysuria, frequency, hematuria, or incontinence  NEUROLOGICAL: No headaches, memory loss, has loss of strength, and numbness, no tremors  SKIN: No itching, burning, rashes, or lesions   ENDOCRINE: No heat or cold intolerance; No hair loss  MUSCULOSKELETAL: No joint pain or swelling; No muscle pain, has back pain  PSYCHIATRIC: has depression, anxiety, mood swings, difficulty sleeping  HEME/LYMPH: No easy bruising, or bleeding gums  ALLERGY AND IMMUNOLOGIC: No hives or eczema        PHYSICAL EXAM:  T(C): 36.4 (05-24-19 @ 08:05), Max: 36.6 (05-23-19 @ 09:50)  HR: 73 (05-24-19 @ 08:39) (73 - 86)  BP: 116/78 (05-24-19 @ 08:05) (104/70 - 116/78)  RR: 14 (05-24-19 @ 08:05) (14 - 14)  SpO2: 98% (05-24-19 @ 08:39) (98% - 100%)    GENERAL: NAD  HEAD:  Atraumatic, Normocephalic  EYES: EOMI, PERRL, conjunctiva and sclera clear  ENMT:  Moist mucous membranes, Good dentition, No lesions  NECK: Supple, No JVD, Normal thyroid  NERVOUS SYSTEM:  Alert & Oriented X3, no new deficit  CHEST/LUNG: Clear to ascultation bilaterally; No rales, rhonchi, wheezing, or rubs  HEART: Regular rate and rhythm; has murmurs, no rubs, or gallops  ABDOMEN: Soft, Nontender, Nondistended; Bowel sounds present  EXTREMITIES:  2+ Peripheral Pulses, No clubbing, cyanosis, or edema  SKIN: No rash  PSYCH: calm, mild anxiety          LABS:                        10.4   9.95  )-----------( 442      ( 24 May 2019 05:25 )             32.9     05-24    141  |  103  |  17  ----------------------------<  80  3.9   |  34<H>  |  0.70    Ca    9.1      24 May 2019 05:25    TPro  6.1  /  Alb  2.5<L>  /  TBili  0.2  /  DBili  x   /  AST  15  /  ALT  16  /  AlkPhos  74  05-24        RADIOLOGY & ADDITIONAL TESTS:    Consultant(s) Notes Reviewed:  [x] YES  [ ] NO    Care Discussed with Consultants/Other Providers [x] YES  [ ] NO

## 2019-05-24 NOTE — PROGRESS NOTE ADULT - ASSESSMENT
59 year old female s/p suspected CVA vs. encephalitis admitted to rehab with left-sided weakness and functional deficits.     #r/o CVA  continue ASA  GI ppx    #Tachycardia - resolved  continue metoprolol BID, monitor closely    #ILD  pulmicort, xopenex, spiriva  on home O2 PRN.     #Seizure  Continue Depakote, seizure ppx, f/u neurology    #constipation/ rectal pain  GI following, bowel regimen for constipation    #back pain  Tylenol and lidocaine patch.     #Vitamin B12 deficiency  patient reports she is vegetarian  continue cyanocobalamin 1000 MICROGram(s) Oral daily    #anxiety  -Xanax if needed  -Remeron   -Psychiatry evaluation    #GERD  continue pantoprazole     #DVT ppx  Lovenox

## 2019-05-24 NOTE — PROGRESS NOTE ADULT - SUBJECTIVE AND OBJECTIVE BOX
CHIEF COMPLAINT: NAD.       HISTORY OF PRESENT ILLNESS  60 yo female with PMHx of MVP, chronic SDH, interstitial lung disease/pneumothorax, pulmonary nodules, meniere's, non hodgkins lymphoma (SCD/XRT/chemo at [facility] 2003), TIA, tachycardia, occipital neuralgia, and neuropathy.  Patient was initially admitted to [place] on 3/13/19 with acute onset of b/l UE weakness associated with nausea, blurry vision, and HA. CT head showed chronic B/L frontal SDH. The rest of the workup was negative. CVA/TIA r/out.  Pt was discharged home but on 3/17 had apparent seizure.  She was admitted to [facility] and intubated and placed on depakote.  CTA H/N, MRI, EEG unremarkable.  ID Recommended LP for fever; family deferred.  Transferred to [facility] on 3/19/19.  MRI brain showed cerebral ischemia. The rest of the workup was negative, this including CSF studies. EEG neg seizures. (Evaluated by lymphoma specialist/onco, PET 4/5/19 r/o recurrence of lymphoma. Increased signal at base of tongue to follow up outpt ENT). Additionally, course complicated with pneumothorax, pulmonary consulted, no interventions, self resolved.  Tachycardia - per pulm, related related to chronic lung disease.  GI consulted for rectal pain; diagnosed c anal fissure/constipation, bowel regimen started. KUB 4/12/19 No stool, no free air, no distended loops of small bowel, fibroids. Refused further workup.   Other issues while admitted:  Hypotension - home diltiazem held per cardio  dizziness - sporadic, self resolving.  Pt deferred MRI.  May be 2/2 to hx of Meniere's disease.      *TTE 3/18/19 EF 60%, mod-severe MR.   MRI brain 3/29/19 cortically based restricted diffusion within R>L frontoparietal region as well as additional scattered small foci, may represent evolution of a recent ischemia vs improving encephalitis. Patient medically optimized and cleared for discharge to acute rehab at [facility] on 4/19/19.   At [facility] patient presented with rectal pain, fluctuating tachycardia 110-150 at rest and orthostatic hypotension. GI, hematology, cardiology and pulmonary evaluation requested. Rectal bleeding developed in the morning of 4/21/19 with severe hypotension and tachycardia. RRT called. Patient transferred to ICU. Patient's course in ICU reviewed and discussed with staff. Investigations, current lab results and recent treatments also reviewed and discussed. Patient was attempted to be evaluated by PT twice but unable to tolerate evaluation due to severe tachycardia up to 150 at rest and symptomatic orthostatic hypotension as low as 80s. Patient is preparing for colonoscopy today to evaluate the source of rectal bleed. S/p multiple PRBC transfusions. Presently on IV antibiotics and IV steroids. (09 May 2019 12:11)      PAST MEDICAL & SURGICAL HISTORY:  Interstitial lung disease: on home o2 prn  NHL (non-Hodgkin's lymphoma): Agem 45 sp chemo/rt/stem cell  Transient cerebral ischemia, unspecified type  Mitral prolapse  History of tonsillectomy  History of appendectomy       REVIEW OF SYMPTOMS  [X] Constitutional WNL     [X] Cardio WNL            [X] Resp WNL           [X] GI WNL                          [X]  WNL                   [X] Heme WNL                  VITALS  Vital Signs Last 24 Hrs  T(C): 36.4 (24 May 2019 08:05), Max: 36.6 (23 May 2019 19:58)  T(F): 97.5 (24 May 2019 08:05), Max: 97.8 (23 May 2019 19:58)  HR: 73 (24 May 2019 08:39) (73 - 86)  BP: 116/78 (24 May 2019 08:05) (105/69 - 116/78)  BP(mean): --  RR: 14 (24 May 2019 08:05) (14 - 14)  SpO2: 98% (24 May 2019 08:39) (98% - 99%)    PHYSICAL EXAM  Constitutional - NAD  HEENT - NCAT, EOMI  Neck - Supple, No limited ROM  Chest - No wheeze, No rhonchi, No crackles  Cardiovascular - RRR, S1S2, No murmurs  Abdomen - BS+, Soft, NTND  Extremities - No C/C/E, No calf tenderness   Skin-no rash      Neurologic Exam - no new deficits this am.                    Psychiatric - Mood improved today, Affect WNL       FUNCTIONAL PROGRESS  Gait - mod A  ADLs - mod A  Transfers - mod A  Functional transfer - mod A    RECENT LABS                        10.4   9.95  )-----------( 442      ( 24 May 2019 05:25 )             32.9     05-24    141  |  103  |  17  ----------------------------<  80  3.9   |  34<H>  |  0.70    Ca    9.1      24 May 2019 05:25    TPro  6.1  /  Alb  2.5<L>  /  TBili  0.2  /  DBili  x   /  AST  15  /  ALT  16  /  AlkPhos  74  05-24      LIVER FUNCTIONS - ( 24 May 2019 05:25 )  Alb: 2.5 g/dL / Pro: 6.1 g/dL / ALK PHOS: 74 U/L / ALT: 16 U/L DA / AST: 15 U/L / GGT: x                 Valproic Acid Level, Serum: 38 ug/mL (05-10-19 @ 14:31)  Valproic Acid Level, Serum: 60 ug/mL (04-22-19 @ 13:40)            RADIOLOGY/OTHER RESULTS      CURRENT MEDICATIONS  MEDICATIONS  (STANDING):  aspirin enteric coated 81 milliGRAM(s) Oral daily  buDESOnide  80 MICROgram(s)/formoterol 4.5 MICROgram(s) Inhaler 2 Puff(s) Inhalation two times a day  cyanocobalamin 1000 MICROGram(s) Oral daily  enoxaparin Injectable 40 milliGRAM(s) SubCutaneous daily  lidocaine   Patch 1 Patch Transdermal <User Schedule>  lubiprostone 8 MICROGram(s) Oral two times a day  metoprolol tartrate 25 milliGRAM(s) Oral two times a day  mirtazapine 7.5 milliGRAM(s) Oral at bedtime  pantoprazole    Tablet 40 milliGRAM(s) Oral before breakfast  tiotropium 18 MICROgram(s) Capsule 1 Capsule(s) Inhalation daily  valproic acid 250 milliGRAM(s) Oral every 12 hours  zinc oxide 20% Ointment 1 Application(s) Topical daily    MEDICATIONS  (PRN):  acetaminophen   Tablet .. 650 milliGRAM(s) Oral every 6 hours PRN Moderate Pain (4 - 6)  docusate sodium 100 milliGRAM(s) Oral daily PRN Constipation  ondansetron    Tablet 4 milliGRAM(s) Oral every 6 hours PRN Nausea and/or Vomiting  ondansetron    Tablet 4 milliGRAM(s) Oral every 6 hours PRN Nausea      ASSESSMENT & PLAN      GI/Bowel Management - Amitiza   Management - Toilet Q2  Skin - Turn Q2  Pain - Tylenol PRN  DVT PPX - Lovenox     Continue comprehensive acute rehab program consisting of 3hrs/day of OT/PT and SLP.

## 2019-05-25 PROCEDURE — 70450 CT HEAD/BRAIN W/O DYE: CPT | Mod: 26

## 2019-05-25 PROCEDURE — 99232 SBSQ HOSP IP/OBS MODERATE 35: CPT

## 2019-05-25 RX ADMIN — Medication 650 MILLIGRAM(S): at 15:00

## 2019-05-25 RX ADMIN — Medication 25 MILLIGRAM(S): at 17:33

## 2019-05-25 RX ADMIN — Medication 650 MILLIGRAM(S): at 21:48

## 2019-05-25 RX ADMIN — Medication 650 MILLIGRAM(S): at 08:00

## 2019-05-25 RX ADMIN — PANTOPRAZOLE SODIUM 40 MILLIGRAM(S): 20 TABLET, DELAYED RELEASE ORAL at 06:27

## 2019-05-25 RX ADMIN — ONDANSETRON 4 MILLIGRAM(S): 8 TABLET, FILM COATED ORAL at 14:17

## 2019-05-25 RX ADMIN — Medication 650 MILLIGRAM(S): at 22:48

## 2019-05-25 RX ADMIN — Medication 650 MILLIGRAM(S): at 07:20

## 2019-05-25 RX ADMIN — Medication 250 MILLIGRAM(S): at 17:33

## 2019-05-25 RX ADMIN — Medication 650 MILLIGRAM(S): at 14:18

## 2019-05-25 RX ADMIN — Medication 250 MILLIGRAM(S): at 06:27

## 2019-05-25 RX ADMIN — PREGABALIN 1000 MICROGRAM(S): 225 CAPSULE ORAL at 11:23

## 2019-05-25 RX ADMIN — ENOXAPARIN SODIUM 40 MILLIGRAM(S): 100 INJECTION SUBCUTANEOUS at 11:23

## 2019-05-25 NOTE — PROVIDER CONTACT NOTE (OTHER) - SITUATION
Pt states has new headache behind eyes and temporal region and is nauseas even after receiving medications for pain and nausea. Pt also states she does not feel like self.

## 2019-05-25 NOTE — PROGRESS NOTE ADULT - SUBJECTIVE AND OBJECTIVE BOX
Chief complaint: no new complaints    Patient is a 59y old  Female who presents with a chief complaint of debility/functional deficits (24 May 2019 15:45)    HEALTH ISSUES - PROBLEM Dx:  Rectal pain: Rectal pain  Constipation: Constipation              PAST MEDICAL & SURGICAL HISTORY:  Interstitial lung disease: on home o2 prn  NHL (non-Hodgkin's lymphoma): Agem 45 sp chemo/rt/stem cell  Transient cerebral ischemia, unspecified type  Mitral prolapse  History of tonsillectomy  History of appendectomy      VITALS  Vital Signs Last 24 Hrs  T(C): 36.6 (24 May 2019 22:43), Max: 36.6 (24 May 2019 22:43)  T(F): 97.9 (24 May 2019 22:43), Max: 97.9 (24 May 2019 22:43)  HR: 78 (25 May 2019 08:00) (72 - 95)  BP: 109/72 (25 May 2019 06:25) (104/79 - 109/72)  BP(mean): --  RR: 14 (24 May 2019 22:43) (14 - 14)  SpO2: 98% (25 May 2019 08:00) (95% - 98%)      PHYSICAL EXAM  Constitutional - NAD, Comfortable  HEENT - NCAT, EOMI  Neck - Supple, No limited ROM  Chest - CTA bilaterally  Cardiovascular - RRR, S1S2,   Abdomen - BS+, Soft, NTND  Extremities - No C/C/E, No calf tenderness   Neurologic Exam -                    Cognitive - Awake, Alert, AAO X3     No new focal deficits                 RECENT LABS                        10.4   9.95  )-----------( 442      ( 24 May 2019 05:25 )             32.9     05-24    141  |  103  |  17  ----------------------------<  80  3.9   |  34<H>  |  0.70    Ca    9.1      24 May 2019 05:25    TPro  6.1  /  Alb  2.5<L>  /  TBili  0.2  /  DBili  x   /  AST  15  /  ALT  16  /  AlkPhos  74  05-24                  CURRENT MEDICATIONS    MEDICATIONS  (STANDING):  aspirin enteric coated 81 milliGRAM(s) Oral daily  buDESOnide  80 MICROgram(s)/formoterol 4.5 MICROgram(s) Inhaler 2 Puff(s) Inhalation two times a day  cyanocobalamin 1000 MICROGram(s) Oral daily  enoxaparin Injectable 40 milliGRAM(s) SubCutaneous daily  lidocaine   Patch 1 Patch Transdermal <User Schedule>  lubiprostone 8 MICROGram(s) Oral two times a day  metoprolol tartrate 25 milliGRAM(s) Oral two times a day  mirtazapine 7.5 milliGRAM(s) Oral at bedtime  pantoprazole    Tablet 40 milliGRAM(s) Oral before breakfast  tiotropium 18 MICROgram(s) Capsule 1 Capsule(s) Inhalation daily  valproic acid 250 milliGRAM(s) Oral every 12 hours  zinc oxide 20% Ointment 1 Application(s) Topical daily    MEDICATIONS  (PRN):  acetaminophen   Tablet .. 650 milliGRAM(s) Oral every 6 hours PRN Moderate Pain (4 - 6)  docusate sodium 100 milliGRAM(s) Oral daily PRN Constipation  ondansetron    Tablet 4 milliGRAM(s) Oral every 6 hours PRN Nausea and/or Vomiting  ondansetron    Tablet 4 milliGRAM(s) Oral every 6 hours PRN Nausea    ASSESSMENT & PLAN          GI/Bowel Management - Dulcolax PRN, Fleet PRN   Management - Toilet Q2  Skin - Turn Q2  Pain - Tylenol PRN  DVT PPX - Lovenox      Continue comprehensive acute rehab program consisting of 3hrs/day of OT/PT and SLP.

## 2019-05-26 PROCEDURE — 99232 SBSQ HOSP IP/OBS MODERATE 35: CPT

## 2019-05-26 RX ADMIN — Medication 250 MILLIGRAM(S): at 05:56

## 2019-05-26 RX ADMIN — ENOXAPARIN SODIUM 40 MILLIGRAM(S): 100 INJECTION SUBCUTANEOUS at 11:39

## 2019-05-26 RX ADMIN — Medication 30 MILLILITER(S): at 15:44

## 2019-05-26 RX ADMIN — PREGABALIN 1000 MICROGRAM(S): 225 CAPSULE ORAL at 11:39

## 2019-05-26 RX ADMIN — Medication 25 MILLIGRAM(S): at 17:40

## 2019-05-26 RX ADMIN — Medication 650 MILLIGRAM(S): at 13:11

## 2019-05-26 RX ADMIN — Medication 650 MILLIGRAM(S): at 14:00

## 2019-05-26 RX ADMIN — PANTOPRAZOLE SODIUM 40 MILLIGRAM(S): 20 TABLET, DELAYED RELEASE ORAL at 05:56

## 2019-05-26 RX ADMIN — Medication 25 MILLIGRAM(S): at 05:56

## 2019-05-26 RX ADMIN — Medication 650 MILLIGRAM(S): at 08:00

## 2019-05-26 RX ADMIN — Medication 250 MILLIGRAM(S): at 17:40

## 2019-05-26 RX ADMIN — Medication 650 MILLIGRAM(S): at 08:30

## 2019-05-26 NOTE — PROGRESS NOTE ADULT - SUBJECTIVE AND OBJECTIVE BOX
Chief complaint: no new complaints, no HA today    Patient is a 59y old  Female who presents with a chief complaint of debility/functional deficits (25 May 2019 12:02)        HEALTH ISSUES - PROBLEM Dx:  Rectal pain: Rectal pain  Constipation: Constipation    PAST MEDICAL & SURGICAL HISTORY:  Interstitial lung disease: on home o2 prn  NHL (non-Hodgkin's lymphoma): Agem 45 sp chemo/rt/stem cell  Transient cerebral ischemia, unspecified type  Mitral prolapse  History of tonsillectomy  History of appendectomy    VITALS  Vital Signs Last 24 Hrs  T(C): 36.8 (25 May 2019 21:44), Max: 36.8 (25 May 2019 21:44)  T(F): 98.3 (25 May 2019 21:44), Max: 98.3 (25 May 2019 21:44)  HR: 77 (26 May 2019 09:35) (77 - 106)  BP: 114/66 (26 May 2019 05:54) (96/60 - 114/66)  BP(mean): --  RR: 14 (25 May 2019 21:44) (14 - 14)  SpO2: 96% (26 May 2019 09:35) (96% - 98%)      PHYSICAL EXAM  Constitutional - NAD, Comfortable  HEENT - NCAT, EOMI  Neck - Supple, No limited ROM  Chest - CTA bilaterally  Cardiovascular - RRR, S1S2  Abdomen - BS+, Soft, NTND  Extremities - No C/C/E, No calf tenderness   Neurologic Exam -                    Cognitive - Awake, Alert, AAO X3     No new focal deficits        < from: CT Head No Cont (05.25.19 @ 16:49) >    EXAM:  CT BRAIN      PROCEDURE DATE:  05/25/2019        INTERPRETATION:  HISTORY: Intracranial hemorrhage    Comparison 03/12/19    Evaluation demonstrates no evidence of mass-effect or midline shift. No   intraparenchymal mass lesions or hemorrhage is identified. There is no   evidence of hydrocephalus. No extra-axial collections are noted.    The bone windows demonstrate no gross osseous abnormalities.    Impression:  1. Unremarkable noncontrast CT scan of the brain.        < end of copied text >        CURRENT MEDICATIONS    MEDICATIONS  (STANDING):  aspirin enteric coated 81 milliGRAM(s) Oral daily  buDESOnide  80 MICROgram(s)/formoterol 4.5 MICROgram(s) Inhaler 2 Puff(s) Inhalation two times a day  cyanocobalamin 1000 MICROGram(s) Oral daily  enoxaparin Injectable 40 milliGRAM(s) SubCutaneous daily  lidocaine   Patch 1 Patch Transdermal <User Schedule>  metoprolol tartrate 25 milliGRAM(s) Oral two times a day  mirtazapine 7.5 milliGRAM(s) Oral at bedtime  pantoprazole    Tablet 40 milliGRAM(s) Oral before breakfast  tiotropium 18 MICROgram(s) Capsule 1 Capsule(s) Inhalation daily  valproic acid 250 milliGRAM(s) Oral every 12 hours  zinc oxide 20% Ointment 1 Application(s) Topical daily    MEDICATIONS  (PRN):  acetaminophen   Tablet .. 650 milliGRAM(s) Oral every 6 hours PRN Moderate Pain (4 - 6)  aluminum hydroxide/magnesium hydroxide/simethicone Suspension 30 milliLiter(s) Oral every 6 hours PRN Dyspepsia  docusate sodium 100 milliGRAM(s) Oral daily PRN Constipation  ondansetron    Tablet 4 milliGRAM(s) Oral every 6 hours PRN Nausea and/or Vomiting  ondansetron    Tablet 4 milliGRAM(s) Oral every 6 hours PRN Nausea    ASSESSMENT & PLAN          GI/Bowel Management - Dulcolax PRN, Fleet PRN   Management - Toilet Q2  Skin - Turn Q2  Pain - Tylenol PRN  DVT PPX - Lovenox      Continue comprehensive acute rehab program consisting of 3hrs/day of OT/PT and SLP.

## 2019-05-26 NOTE — CHART NOTE - NSCHARTNOTEFT_GEN_A_CORE
Nutrition Follow Up Note  Hospital Course (Per Electronic Medical Record):   Source: Medical Record [X] Patient [X]Nursing Staff [X]     Diet:  Regular , Lacto-Ovo vegetarian   Patient noted a consuming between % of meals , noted with evidence of severe malnutrition . Patient was being provided nutritional supplements however dislikes , requested order to discontinue, order pending to be signed.    Current Weight: (5/23) 119/54kg, stable weight since last documented weight. ? Admission weight accuracy.    Pertinent Medications: MEDICATIONS  (STANDING):  aspirin enteric coated 81 milliGRAM(s) Oral daily  buDESOnide  80 MICROgram(s)/formoterol 4.5 MICROgram(s) Inhaler 2 Puff(s) Inhalation two times a day  cyanocobalamin 1000 MICROGram(s) Oral daily  enoxaparin Injectable 40 milliGRAM(s) SubCutaneous daily  lidocaine   Patch 1 Patch Transdermal <User Schedule>  metoprolol tartrate 25 milliGRAM(s) Oral two times a day  mirtazapine 7.5 milliGRAM(s) Oral at bedtime  pantoprazole    Tablet 40 milliGRAM(s) Oral before breakfast  tiotropium 18 MICROgram(s) Capsule 1 Capsule(s) Inhalation daily  valproic acid 250 milliGRAM(s) Oral every 12 hours  zinc oxide 20% Ointment 1 Application(s) Topical daily    MEDICATIONS  (PRN):  acetaminophen   Tablet .. 650 milliGRAM(s) Oral every 6 hours PRN Moderate Pain (4 - 6)  aluminum hydroxide/magnesium hydroxide/simethicone Suspension 30 milliLiter(s) Oral every 6 hours PRN Dyspepsia  docusate sodium 100 milliGRAM(s) Oral daily PRN Constipation  ondansetron    Tablet 4 milliGRAM(s) Oral every 6 hours PRN Nausea and/or Vomiting  ondansetron    Tablet 4 milliGRAM(s) Oral every 6 hours PRN Nausea      Pertinent Labs:  05-24 Na141 mmol/L Glu 80 mg/dL K+ 3.9 mmol/L Cr  0.70 mg/dL BUN 17 mg/dL 05-24 Alb 2.5 g/dL<L>        Skin: intact    Edema: none    Last BM: on (5/26)    Estimated Needs:   [X] No Change since Previous Assessment  [X] Recalculated:     Previous Nutrition Diagnosis: Severe Malnutrition    Nutrition Diagnosis is [X] Ongoing         New Nutrition Diagnosis: [X] Not Applicable      Interventions:   1. Continue current diet regimen  2. Request order to discontinue po supplements.    Monitoring & Evaluation: will monitor:  [X] Weights   [X] PO Intake   [X] Follow Up (Per Protocol)  [X] Tolerance to Diet Prescription       RD to follow as per Nutrition protocol  Ayana Grider RDN

## 2019-05-27 LAB
ALBUMIN SERPL ELPH-MCNC: 2.5 G/DL — LOW (ref 3.3–5)
ALP SERPL-CCNC: 73 U/L — SIGNIFICANT CHANGE UP (ref 40–120)
ALT FLD-CCNC: 17 U/L DA — SIGNIFICANT CHANGE UP (ref 10–45)
ANION GAP SERPL CALC-SCNC: 7 MMOL/L — SIGNIFICANT CHANGE UP (ref 5–17)
AST SERPL-CCNC: 12 U/L — SIGNIFICANT CHANGE UP (ref 10–40)
BILIRUB SERPL-MCNC: 0.2 MG/DL — SIGNIFICANT CHANGE UP (ref 0.2–1.2)
BUN SERPL-MCNC: 18 MG/DL — SIGNIFICANT CHANGE UP (ref 7–23)
CALCIUM SERPL-MCNC: 9.4 MG/DL — SIGNIFICANT CHANGE UP (ref 8.4–10.5)
CHLORIDE SERPL-SCNC: 102 MMOL/L — SIGNIFICANT CHANGE UP (ref 96–108)
CO2 SERPL-SCNC: 32 MMOL/L — HIGH (ref 22–31)
CREAT SERPL-MCNC: 0.7 MG/DL — SIGNIFICANT CHANGE UP (ref 0.5–1.3)
GLUCOSE SERPL-MCNC: 80 MG/DL — SIGNIFICANT CHANGE UP (ref 70–99)
HCT VFR BLD CALC: 31.4 % — LOW (ref 34.5–45)
HGB BLD-MCNC: 10.1 G/DL — LOW (ref 11.5–15.5)
MCHC RBC-ENTMCNC: 29.4 PG — SIGNIFICANT CHANGE UP (ref 27–34)
MCHC RBC-ENTMCNC: 32.2 GM/DL — SIGNIFICANT CHANGE UP (ref 32–36)
MCV RBC AUTO: 91.3 FL — SIGNIFICANT CHANGE UP (ref 80–100)
NRBC # BLD: 0 /100 WBCS — SIGNIFICANT CHANGE UP (ref 0–0)
PLATELET # BLD AUTO: 429 K/UL — HIGH (ref 150–400)
POTASSIUM SERPL-MCNC: 3.6 MMOL/L — SIGNIFICANT CHANGE UP (ref 3.5–5.3)
POTASSIUM SERPL-SCNC: 3.6 MMOL/L — SIGNIFICANT CHANGE UP (ref 3.5–5.3)
PROT SERPL-MCNC: 6.1 G/DL — SIGNIFICANT CHANGE UP (ref 6–8.3)
RBC # BLD: 3.44 M/UL — LOW (ref 3.8–5.2)
RBC # FLD: 15.8 % — HIGH (ref 10.3–14.5)
SODIUM SERPL-SCNC: 141 MMOL/L — SIGNIFICANT CHANGE UP (ref 135–145)
WBC # BLD: 6.92 K/UL — SIGNIFICANT CHANGE UP (ref 3.8–10.5)
WBC # FLD AUTO: 6.92 K/UL — SIGNIFICANT CHANGE UP (ref 3.8–10.5)

## 2019-05-27 PROCEDURE — 99232 SBSQ HOSP IP/OBS MODERATE 35: CPT

## 2019-05-27 RX ADMIN — PANTOPRAZOLE SODIUM 40 MILLIGRAM(S): 20 TABLET, DELAYED RELEASE ORAL at 06:10

## 2019-05-27 RX ADMIN — Medication 650 MILLIGRAM(S): at 21:52

## 2019-05-27 RX ADMIN — Medication 250 MILLIGRAM(S): at 06:10

## 2019-05-27 RX ADMIN — ENOXAPARIN SODIUM 40 MILLIGRAM(S): 100 INJECTION SUBCUTANEOUS at 13:10

## 2019-05-27 RX ADMIN — Medication 650 MILLIGRAM(S): at 22:45

## 2019-05-27 RX ADMIN — Medication 250 MILLIGRAM(S): at 18:18

## 2019-05-27 RX ADMIN — PREGABALIN 1000 MICROGRAM(S): 225 CAPSULE ORAL at 13:09

## 2019-05-27 NOTE — PROGRESS NOTE ADULT - SUBJECTIVE AND OBJECTIVE BOX
Chief complaint: no new complaints no acute events overnight    Patient is a 59y old  Female who presents with a chief complaint of debility/functional deficits (26 May 2019 11:15)        HEALTH ISSUES - PROBLEM Dx:  Rectal pain: Rectal pain  Constipation: Constipation              PAST MEDICAL & SURGICAL HISTORY:  Interstitial lung disease: on home o2 prn  NHL (non-Hodgkin's lymphoma): Agem 43 sp chemo/rt/stem cell  Transient cerebral ischemia, unspecified type  Mitral prolapse  History of tonsillectomy  History of appendectomy       REVIEW OF SYMPTOMS  [X] Constitutional WNL     [X] Cardio WNL            [X] Resp WNL           [X] GI WNL                          [X]  WNL                   [X] Heme WNL              [X] Endo WNL                     [X] Skin WNL                 [X] MSK WNL            [X] Neuro WNL                   [X] Cognitive WNL        [X] Psych WNL      VITALS  Vital Signs Last 24 Hrs  T(C): 36.7 (27 May 2019 09:03), Max: 36.8 (26 May 2019 17:38)  T(F): 98.1 (27 May 2019 09:03), Max: 98.3 (26 May 2019 17:38)  HR: 73 (27 May 2019 09:03) (68 - 91)  BP: 97/65 (27 May 2019 09:03) (97/65 - 109/69)  BP(mean): --  RR: 14 (27 May 2019 09:03) (14 - 14)  SpO2: 97% (27 May 2019 09:03) (95% - 98%)      PHYSICAL EXAM  Constitutional - NAD, Comfortable  HEENT - NCAT, EOMI  Neck - Supple, No limited ROM  Chest - CTA bilaterally  Cardiovascular - RRR, S1S2,   Abdomen - BS+, Soft, NTND  Extremities - No C/C/E, No calf tenderness   Neurologic Exam -                    Cognitive - Awake, Alert, AAO X3     No new focal deficits            RECENT LABS                        10.1   6.92  )-----------( 429      ( 27 May 2019 06:40 )             31.4     05-27    141  |  102  |  18  ----------------------------<  80  3.6   |  32<H>  |  0.70    Ca    9.4      27 May 2019 06:40    TPro  6.1  /  Alb  2.5<L>  /  TBili  0.2  /  DBili  x   /  AST  12  /  ALT  17  /  AlkPhos  73  05-27            RADIOLOGY/OTHER RESULTS      CURRENT MEDICATIONS    MEDICATIONS  (STANDING):  aspirin enteric coated 81 milliGRAM(s) Oral daily  buDESOnide  80 MICROgram(s)/formoterol 4.5 MICROgram(s) Inhaler 2 Puff(s) Inhalation two times a day  cyanocobalamin 1000 MICROGram(s) Oral daily  enoxaparin Injectable 40 milliGRAM(s) SubCutaneous daily  lidocaine   Patch 1 Patch Transdermal <User Schedule>  metoprolol tartrate 25 milliGRAM(s) Oral two times a day  mirtazapine 7.5 milliGRAM(s) Oral at bedtime  pantoprazole    Tablet 40 milliGRAM(s) Oral before breakfast  tiotropium 18 MICROgram(s) Capsule 1 Capsule(s) Inhalation daily  valproic acid 250 milliGRAM(s) Oral every 12 hours  zinc oxide 20% Ointment 1 Application(s) Topical daily    MEDICATIONS  (PRN):  acetaminophen   Tablet .. 650 milliGRAM(s) Oral every 6 hours PRN Moderate Pain (4 - 6)  aluminum hydroxide/magnesium hydroxide/simethicone Suspension 30 milliLiter(s) Oral every 6 hours PRN Dyspepsia  docusate sodium 100 milliGRAM(s) Oral daily PRN Constipation  ondansetron    Tablet 4 milliGRAM(s) Oral every 6 hours PRN Nausea and/or Vomiting  ondansetron    Tablet 4 milliGRAM(s) Oral every 6 hours PRN Nausea    ASSESSMENT & PLAN          GI/Bowel Management - Dulcolax PRN, Fleet PRN   Management - Toilet Q2  Skin - Turn Q2  Pain - Tylenol PRN  DVT PPX - Lovenox      Continue comprehensive acute rehab program consisting of 3hrs/day of OT/PT and SLP.

## 2019-05-28 PROCEDURE — 99233 SBSQ HOSP IP/OBS HIGH 50: CPT

## 2019-05-28 RX ADMIN — TIOTROPIUM BROMIDE 1 CAPSULE(S): 18 CAPSULE ORAL; RESPIRATORY (INHALATION) at 08:04

## 2019-05-28 RX ADMIN — PANTOPRAZOLE SODIUM 40 MILLIGRAM(S): 20 TABLET, DELAYED RELEASE ORAL at 05:15

## 2019-05-28 RX ADMIN — Medication 250 MILLIGRAM(S): at 05:15

## 2019-05-28 RX ADMIN — ENOXAPARIN SODIUM 40 MILLIGRAM(S): 100 INJECTION SUBCUTANEOUS at 11:58

## 2019-05-28 RX ADMIN — ZINC OXIDE 1 APPLICATION(S): 200 OINTMENT TOPICAL at 11:34

## 2019-05-28 RX ADMIN — Medication 650 MILLIGRAM(S): at 16:01

## 2019-05-28 RX ADMIN — Medication 650 MILLIGRAM(S): at 15:01

## 2019-05-28 RX ADMIN — Medication 250 MILLIGRAM(S): at 17:16

## 2019-05-28 RX ADMIN — PREGABALIN 1000 MICROGRAM(S): 225 CAPSULE ORAL at 11:58

## 2019-05-28 RX ADMIN — ONDANSETRON 4 MILLIGRAM(S): 8 TABLET, FILM COATED ORAL at 05:16

## 2019-05-28 NOTE — PROGRESS NOTE ADULT - ATTENDING COMMENTS
Multidisciplinary team meeting today:  patient's functional goals and needs, functional and clinical  progress were discussed, barriers to discharge were identified. Anticipate discharge home with home care, 24/7 supervision for safety.    EDOD 5/31/19

## 2019-05-28 NOTE — PROGRESS NOTE ADULT - ASSESSMENT
60 yo RHWF female with complex medical history  s/p suspected multiple  CVAs vs. encephalitis being readmitted to acute rehabilitation unit  with spastic quadriparesis, more pronounced in left UE, sensory impairment, gait impairment  and functional deficits.    #r/o CVA  ASA continues c GI ppx    #Leukocytosis  -resolved, afebrile, ID and Medicine are following.  -will continue to monitor off ABx, non toxic.     #tachycardia  -Metoprolol BID. Tachycardia well controlled.     #ILD  -Pulmicort, Spiriva, pulmonary evaluation completed.  -on home O2 PRN.     #Seizure  Continue Depakote, seizure ppx.     #Rectal pain/hx of bleed/IBS  GI is following  hold Amitiza due to multiple loose BMs    # Anxiety  -Xanax as needed  -Psychiatry following  -Remeron had been added for insomnia and to improve appetite    #debility  Lovenox SQ cleared by GI, Nutrition eval. 60 yo RHWF female with complex medical history  s/p suspected multiple  CVAs vs. encephalitis being readmitted to acute rehabilitation unit  with spastic quadriparesis, more pronounced in left UE, sensory impairment, gait impairment  and functional deficits.    #r/o CVA  ASA continues c GI ppx    #Leukocytosis  -resolved, afebrile, ID and Medicine are following.  -will continue to monitor off ABx, non toxic.     #tachycardia  -Metoprolol BID. Tachycardia well controlled.     #ILD  -Pulmicort, Spiriva, pulmonary evaluation completed.  -on home O2 PRN.     #Seizure  Continue Depakote, seizure ppx.     #Rectal pain/hx of bleed/IBS  GI is following  hold Amitiza/Colace due to multiple loose BMs, encourage Po fluids intake.    # Anxiety  -Xanax as needed  -Psychiatry following  -Remeron had been added for insomnia and to improve appetite    #debility  Lovenox SQ cleared by GI, Nutrition eval.

## 2019-05-28 NOTE — PROGRESS NOTE ADULT - ASSESSMENT
59 year old female s/p suspected CVA vs. encephalitis admitted to rehab with left-sided weakness and functional deficits.     #r/o CVA  continue ASA  GI ppx    #Tachycardia - resolved  continue metoprolol BID, monitor closely    #ILD  pulmicort, xopenex, spiriva  on home O2 PRN.     #Seizure  Continue Depakote, seizure ppx, f/u neurology    #constipation/ rectal pain  GI following, continue bowel regimen for constipation  pain control for rectal pain    #back pain  Tylenol and lidocaine patch.     #Vitamin B12 deficiency  patient reports she is vegetarian  continue cyanocobalamin 1000 MICROGram(s) Oral daily    #anxiety  -Xanax if needed  -Remeron   -Psychiatry evaluation    #GERD  continue pantoprazole     #DVT ppx  Lovenox

## 2019-05-28 NOTE — PROGRESS NOTE ADULT - SUBJECTIVE AND OBJECTIVE BOX
CHIEF COMPLAINT: reports loose BMs, suffers from IBS. Slept well, no new complaints otherwise.      HISTORY OF PRESENT ILLNESS    60 yo female with PMHx of MVP, chronic SDH, interstitial lung disease/pneumothorax, pulmonary nodules, meniere's, non hodgkins lymphoma (SCD/XRT/chemo at [facility] 2003), TIA, tachycardia, occipital neuralgia, and neuropathy.  Patient was initially admitted to [facility] on 3/13/19 with acute onset of b/l UE weakness associated with nausea, blurry vision, and HA. CT head showed chronic B/L frontal SDH. The rest of the workup was negative. CVA/TIA r/out.  Pt was discharged home but on 3/17 had apparent seizure.  She was admitted to [facility] and intubated and placed on depakote.  CTA H/N, MRI, EEG unremarkable.  ID Recommended LP for fever; family deferred.  Transferred [facility] on 3/19[facility].  MRI brain showed cerebral ischemia. The rest of the workup was negative, this including CSF studies. EEG neg seizures. (Evaluated by lymphoma specialist/onco, PET 4/5/19 r/o recurrence of lymphoma. Increased signal at base of tongue to follow up outpt ENT). Additionally, course complicated with pneumothorax, pulmonary consulted, no interventions, self resolved.  Tachycardia - per pulm, related related to chronic lung disease.  GI consulted for rectal pain; diagnosed c anal fissure/constipation, bowel regimen started. KUB 4/12/19 No stool, no free air, no distended loops of small bowel, fibroids. Refused further workup.   Other issues while admitted:  Hypotension - home diltiazem held per cardio  dizziness - sporadic, self resolving.  Pt deferred MRI.  May be 2/2 to hx of Meniere's disease.      *TTE 3/18/19 EF 60%, mod-severe MR.   MRI brain 3/29/19 cortically based restricted diffusion within R>L frontoparietal region as well as additional scattered small foci, may represent evolution of a recent ischemia vs improving encephalitis. Patient medically optimized and cleared for discharge to acute rehab at [facility] on 4/19/19.   At [facility] patient presented with rectal pain, fluctuating tachycardia 110-150 at rest and orthostatic hypotension. GI, hematology, cardiology and pulmonary evaluation requested. Rectal bleeding developed in the morning of 4/21/19 with severe hypotension and tachycardia. RRT called. Patient transferred to ICU. Patient's course in ICU reviewed and discussed with staff. Investigations, current lab results and recent treatments also reviewed and discussed. Patient was attempted to be evaluated by PT twice but unable to tolerate evaluation due to severe tachycardia up to 150 at rest and symptomatic orthostatic hypotension as low as 80s. Patient is preparing for colonoscopy today to evaluate the source of rectal bleed. S/p multiple PRBC transfusions. Presently on IV antibiotics and IV steroids. (09 May 2019 12:11)        PAST MEDICAL & SURGICAL HISTORY:  Interstitial lung disease: on home o2 prn  NHL (non-Hodgkin's lymphoma): Agem 43 sp chemo/rt/stem cell  Transient cerebral ischemia, unspecified type  Mitral prolapse  History of tonsillectomy  History of appendectomy      Vital Signs Last 24 Hrs  T(C): 36.6 (28 May 2019 07:25), Max: 36.9 (27 May 2019 21:54)  T(F): 97.8 (28 May 2019 07:25), Max: 98.5 (27 May 2019 21:54)  HR: 89 (28 May 2019 07:25) (62 - 97)  BP: 108/74 (28 May 2019 07:25) (108/68 - 117/80)  BP(mean): --  RR: 14 (28 May 2019 07:25) (14 - 14)  SpO2: 98% (28 May 2019 08:04) (97% - 100%)      PHYSICAL EXAM  Constitutional - NAD, Comfortable  HEENT - NCAT, EOMI  Neck - Supple, No limited ROM  Chest - CTA bilaterally  Cardiovascular - RRR, S1S2  Abdomen - BS+, Soft, NTND  Extremities - No C/C/E, No calf tenderness   Skin-no rash  Neurologic Exam - no new focal defiits                        RECENT LABS                        10.1   6.92  )-----------( 429      ( 27 May 2019 06:40 )             31.4     05-27    141  |  102  |  18  ----------------------------<  80  3.6   |  32<H>  |  0.70    Ca    9.4      27 May 2019 06:40    TPro  6.1  /  Alb  2.5<L>  /  TBili  0.2  /  DBili  x   /  AST  12  /  ALT  17  /  AlkPhos  73  05-27      LIVER FUNCTIONS - ( 27 May 2019 06:40 )  Alb: 2.5 g/dL / Pro: 6.1 g/dL / ALK PHOS: 73 U/L / ALT: 17 U/L DA / AST: 12 U/L / GGT: x                 Valproic Acid Level, Serum: 38 ug/mL (05-10-19 @ 14:31)  Valproic Acid Level, Serum: 60 ug/mL (04-22-19 @ 13:40)                RADIOLOGY/OTHER RESULTS      CURRENT MEDICATIONS  MEDICATIONS  (STANDING):  aspirin enteric coated 81 milliGRAM(s) Oral daily  buDESOnide  80 MICROgram(s)/formoterol 4.5 MICROgram(s) Inhaler 2 Puff(s) Inhalation two times a day  cyanocobalamin 1000 MICROGram(s) Oral daily  enoxaparin Injectable 40 milliGRAM(s) SubCutaneous daily  lidocaine   Patch 1 Patch Transdermal <User Schedule>  metoprolol tartrate 25 milliGRAM(s) Oral two times a day  mirtazapine 7.5 milliGRAM(s) Oral at bedtime  pantoprazole    Tablet 40 milliGRAM(s) Oral before breakfast  tiotropium 18 MICROgram(s) Capsule 1 Capsule(s) Inhalation daily  valproic acid 250 milliGRAM(s) Oral every 12 hours  zinc oxide 20% Ointment 1 Application(s) Topical daily    MEDICATIONS  (PRN):  acetaminophen   Tablet .. 650 milliGRAM(s) Oral every 6 hours PRN Moderate Pain (4 - 6)  aluminum hydroxide/magnesium hydroxide/simethicone Suspension 30 milliLiter(s) Oral every 6 hours PRN Dyspepsia  docusate sodium 100 milliGRAM(s) Oral daily PRN Constipation  ondansetron    Tablet 4 milliGRAM(s) Oral every 6 hours PRN Nausea and/or Vomiting  ondansetron    Tablet 4 milliGRAM(s) Oral every 6 hours PRN Nausea

## 2019-05-28 NOTE — PROGRESS NOTE ADULT - SUBJECTIVE AND OBJECTIVE BOX
Patient is a 59y old  Female who presents with a chief complaint of debility/functional deficits (27 May 2019 10:38)        MEDICATIONS  (STANDING):  aspirin enteric coated 81 milliGRAM(s) Oral daily  buDESOnide  80 MICROgram(s)/formoterol 4.5 MICROgram(s) Inhaler 2 Puff(s) Inhalation two times a day  cyanocobalamin 1000 MICROGram(s) Oral daily  enoxaparin Injectable 40 milliGRAM(s) SubCutaneous daily  lidocaine   Patch 1 Patch Transdermal <User Schedule>  metoprolol tartrate 25 milliGRAM(s) Oral two times a day  mirtazapine 7.5 milliGRAM(s) Oral at bedtime  pantoprazole    Tablet 40 milliGRAM(s) Oral before breakfast  tiotropium 18 MICROgram(s) Capsule 1 Capsule(s) Inhalation daily  valproic acid 250 milliGRAM(s) Oral every 12 hours  zinc oxide 20% Ointment 1 Application(s) Topical daily    MEDICATIONS  (PRN):  acetaminophen   Tablet .. 650 milliGRAM(s) Oral every 6 hours PRN Moderate Pain (4 - 6)  aluminum hydroxide/magnesium hydroxide/simethicone Suspension 30 milliLiter(s) Oral every 6 hours PRN Dyspepsia  docusate sodium 100 milliGRAM(s) Oral daily PRN Constipation  ondansetron    Tablet 4 milliGRAM(s) Oral every 6 hours PRN Nausea and/or Vomiting  ondansetron    Tablet 4 milliGRAM(s) Oral every 6 hours PRN Nausea      REVIEW OF SYSTEMS:  CONSTITUTIONAL: No fever, weight loss, or fatigue  EYES: No eye pain, visual disturbances, or discharge  ENMT:  No difficulty hearing, tinnitus, vertigo; No sinus or throat pain  NECK: No pain or stiffness  RESPIRATORY: No cough, wheezing, chills or hemoptysis; No shortness of breath  CARDIOVASCULAR: No chest pain, palpitations, dizziness, or leg swelling  GASTROINTESTINAL: No abdominal or epigastric pain. No nausea, vomiting, or hematemesis; No diarrhea or constipation. No melena or hematochezia.  GENITOURINARY: No dysuria, frequency, hematuria, or incontinence  NEUROLOGICAL: No headaches, memory loss, loss of strength, numbness, or tremors  SKIN: No itching, burning, rashes, or lesions   LYMPH NODES: No enlarged glands  ENDOCRINE: No heat or cold intolerance; No hair loss  MUSCULOSKELETAL: No joint pain or swelling; No muscle, back, or extremity pain  PSYCHIATRIC: No depression, anxiety, mood swings, or difficulty sleeping  HEME/LYMPH: No easy bruising, or bleeding gums  ALLERY AND IMMUNOLOGIC: No hives or eczema    PHYSICAL EXAM:    T(C): 36.6 (05-28-19 @ 07:25), Max: 36.9 (05-27-19 @ 21:54)  HR: 89 (05-28-19 @ 07:25) (62 - 97)  BP: 108/74 (05-28-19 @ 07:25) (97/65 - 117/80)  RR: 14 (05-28-19 @ 07:25) (14 - 14)  SpO2: 100% (05-28-19 @ 07:25) (95% - 100%)  Wt(kg): --  I&O's Summary    27 May 2019 07:01  -  28 May 2019 07:00  --------------------------------------------------------  IN: 300 mL / OUT: 200 mL / NET: 100 mL        GENERAL: NAD, well-groomed, well-developed  HEAD:  Atraumatic, Normocephalic  EYES: EOMI, PERRL, conjunctiva and sclera clear  ENMT: No tonsillar erythema, exudates, or enlargement; Moist mucous membranes, Good dentition, No lesions  NECK: Supple, No JVD, Normal thyroid  NERVOUS SYSTEM:  Alert & Oriented X3, Good concentration; Motor Strength 5/5 B/L upper and lower extremities; DTRs 2+ intact and symmetric  CHEST/LUNG: Clear to ascultation bilaterally; No rales, rhonchi, wheezing, or rubs  HEART: Regular rate and rhythm; No murmurs, rubs, or gallops  ABDOMEN: Soft, Nontender, Nondistended; Bowel sounds present  EXTREMITIES:  2+ Peripheral Pulses, No clubbing, cyanosis, or edema  LYMPH: No lymphadenopathy noted  SKIN: No rashes or lesions    LABS:                        10.1   6.92  )-----------( 429      ( 27 May 2019 06:40 )             31.4     05-27    141  |  102  |  18  ----------------------------<  80  3.6   |  32<H>  |  0.70    Ca    9.4      27 May 2019 06:40    TPro  6.1  /  Alb  2.5<L>  /  TBili  0.2  /  DBili  x   /  AST  12  /  ALT  17  /  AlkPhos  73  05-27        RADIOLOGY & ADDITIONAL TESTS:      Consultant(s) Notes Reviewed:  [x] YES  [ ] NO    Care Discussed with Consultants/Other Providers [x] YES  [ ] NO Patient is a 59 year old  female who presents with a chief complaint of debility/functional deficits (27 May 2019 10:38)    Patient seen and examined at bedside.    MEDICATIONS  (STANDING):  aspirin enteric coated 81 milliGRAM(s) Oral daily  buDESOnide  80 MICROgram(s)/formoterol 4.5 MICROgram(s) Inhaler 2 Puff(s) Inhalation two times a day  cyanocobalamin 1000 MICROGram(s) Oral daily  enoxaparin Injectable 40 milliGRAM(s) SubCutaneous daily  lidocaine   Patch 1 Patch Transdermal <User Schedule>  metoprolol tartrate 25 milliGRAM(s) Oral two times a day  mirtazapine 7.5 milliGRAM(s) Oral at bedtime  pantoprazole    Tablet 40 milliGRAM(s) Oral before breakfast  tiotropium 18 MICROgram(s) Capsule 1 Capsule(s) Inhalation daily  valproic acid 250 milliGRAM(s) Oral every 12 hours  zinc oxide 20% Ointment 1 Application(s) Topical daily    MEDICATIONS  (PRN):  acetaminophen   Tablet .. 650 milliGRAM(s) Oral every 6 hours PRN Moderate Pain (4 - 6)  aluminum hydroxide/magnesium hydroxide/simethicone Suspension 30 milliLiter(s) Oral every 6 hours PRN Dyspepsia  docusate sodium 100 milliGRAM(s) Oral daily PRN Constipation  ondansetron    Tablet 4 milliGRAM(s) Oral every 6 hours PRN Nausea and/or Vomiting  ondansetron    Tablet 4 milliGRAM(s) Oral every 6 hours PRN Nausea      REVIEW OF SYSTEMS:  CONSTITUTIONAL: No fever, weight loss, has fatigue  EYES: No eye pain, visual disturbances, or discharge  ENMT:  No difficulty hearing, tinnitus, vertigo; No sinus or throat pain  NECK: No pain or stiffness  RESPIRATORY: No cough, wheezing, chills or hemoptysis; intermittent shortness of breath  CARDIOVASCULAR: No chest pain, palpitations, dizziness, or leg swelling  GASTROINTESTINAL: No abdominal or epigastric pain, no nausea, no vomiting, or hematemesis; No diarrhea, has constipation, rectal pain. No melena or hematochezia.  GENITOURINARY: No dysuria, frequency, hematuria, or incontinence  NEUROLOGICAL: No headaches, memory loss, has loss of strength, and numbness, no tremors  SKIN: No itching, burning, rashes, or lesions   ENDOCRINE: No heat or cold intolerance; No hair loss  MUSCULOSKELETAL: No joint pain or swelling; No muscle pain, has back pain  PSYCHIATRIC: has depression, anxiety, mood swings, difficulty sleeping  HEME/LYMPH: No easy bruising, or bleeding gums  ALLERGY AND IMMUNOLOGIC: No hives or eczema        PHYSICAL EXAM:  T(C): 36.6 (05-28-19 @ 07:25), Max: 36.9 (05-27-19 @ 21:54)  HR: 89 (05-28-19 @ 07:25) (62 - 97)  BP: 108/74 (05-28-19 @ 07:25) (97/65 - 117/80)  RR: 14 (05-28-19 @ 07:25) (14 - 14)  SpO2: 100% (05-28-19 @ 07:25) (95% - 100%)    I&O's Summary    27 May 2019 07:01  -  28 May 2019 07:00  --------------------------------------------------------  IN: 300 mL / OUT: 200 mL / NET: 100 mL        GENERAL: NAD  HEAD:  Atraumatic, Normocephalic  EYES: EOMI, PERRL, conjunctiva and sclera clear  ENMT:  Moist mucous membranes, Good dentition, No lesions  NECK: Supple, No JVD, Normal thyroid  NERVOUS SYSTEM:  Alert & Oriented X3, no new deficit  CHEST/LUNG: Clear to ascultation bilaterally; No rales, rhonchi, wheezing, or rubs  HEART: Regular rate and rhythm; has murmurs, no rubs, or gallops  ABDOMEN: Soft, Nontender, Nondistended; Bowel sounds present  EXTREMITIES:  2+ Peripheral Pulses, No clubbing, cyanosis, or edema  SKIN: No rash  PSYCH: calm, mild anxiety    LABS:                        10.1   6.92  )-----------( 429      ( 27 May 2019 06:40 )             31.4     05-27    141  |  102  |  18  ----------------------------<  80  3.6   |  32<H>  |  0.70    Ca    9.4      27 May 2019 06:40    TPro  6.1  /  Alb  2.5<L>  /  TBili  0.2  /  DBili  x   /  AST  12  /  ALT  17  /  AlkPhos  73  05-27        RADIOLOGY & ADDITIONAL TESTS:      Consultant(s) Notes Reviewed:  [x] YES  [ ] NO    Care Discussed with Consultants/Other Providers [x] YES  [ ] NO

## 2019-05-29 LAB
ALBUMIN SERPL ELPH-MCNC: 2.4 G/DL — LOW (ref 3.3–5)
ALP SERPL-CCNC: 72 U/L — SIGNIFICANT CHANGE UP (ref 40–120)
ALT FLD-CCNC: 14 U/L DA — SIGNIFICANT CHANGE UP (ref 10–45)
ANION GAP SERPL CALC-SCNC: 9 MMOL/L — SIGNIFICANT CHANGE UP (ref 5–17)
AST SERPL-CCNC: 11 U/L — SIGNIFICANT CHANGE UP (ref 10–40)
BILIRUB SERPL-MCNC: 0.2 MG/DL — SIGNIFICANT CHANGE UP (ref 0.2–1.2)
BUN SERPL-MCNC: 16 MG/DL — SIGNIFICANT CHANGE UP (ref 7–23)
CALCIUM SERPL-MCNC: 9 MG/DL — SIGNIFICANT CHANGE UP (ref 8.4–10.5)
CHLORIDE SERPL-SCNC: 104 MMOL/L — SIGNIFICANT CHANGE UP (ref 96–108)
CO2 SERPL-SCNC: 28 MMOL/L — SIGNIFICANT CHANGE UP (ref 22–31)
CREAT SERPL-MCNC: 0.75 MG/DL — SIGNIFICANT CHANGE UP (ref 0.5–1.3)
GLUCOSE SERPL-MCNC: 93 MG/DL — SIGNIFICANT CHANGE UP (ref 70–99)
HCT VFR BLD CALC: 29.7 % — LOW (ref 34.5–45)
HGB BLD-MCNC: 9.6 G/DL — LOW (ref 11.5–15.5)
MCHC RBC-ENTMCNC: 30.1 PG — SIGNIFICANT CHANGE UP (ref 27–34)
MCHC RBC-ENTMCNC: 32.3 GM/DL — SIGNIFICANT CHANGE UP (ref 32–36)
MCV RBC AUTO: 93.1 FL — SIGNIFICANT CHANGE UP (ref 80–100)
NRBC # BLD: 0 /100 WBCS — SIGNIFICANT CHANGE UP (ref 0–0)
PLATELET # BLD AUTO: 408 K/UL — HIGH (ref 150–400)
POTASSIUM SERPL-MCNC: 4.3 MMOL/L — SIGNIFICANT CHANGE UP (ref 3.5–5.3)
POTASSIUM SERPL-SCNC: 4.3 MMOL/L — SIGNIFICANT CHANGE UP (ref 3.5–5.3)
PROT SERPL-MCNC: 5.9 G/DL — LOW (ref 6–8.3)
RBC # BLD: 3.19 M/UL — LOW (ref 3.8–5.2)
RBC # FLD: 15.6 % — HIGH (ref 10.3–14.5)
SODIUM SERPL-SCNC: 141 MMOL/L — SIGNIFICANT CHANGE UP (ref 135–145)
WBC # BLD: 5.99 K/UL — SIGNIFICANT CHANGE UP (ref 3.8–10.5)
WBC # FLD AUTO: 5.99 K/UL — SIGNIFICANT CHANGE UP (ref 3.8–10.5)

## 2019-05-29 PROCEDURE — 99233 SBSQ HOSP IP/OBS HIGH 50: CPT

## 2019-05-29 PROCEDURE — 99232 SBSQ HOSP IP/OBS MODERATE 35: CPT

## 2019-05-29 RX ADMIN — Medication 650 MILLIGRAM(S): at 04:49

## 2019-05-29 RX ADMIN — ENOXAPARIN SODIUM 40 MILLIGRAM(S): 100 INJECTION SUBCUTANEOUS at 11:50

## 2019-05-29 RX ADMIN — Medication 650 MILLIGRAM(S): at 05:47

## 2019-05-29 RX ADMIN — Medication 250 MILLIGRAM(S): at 17:29

## 2019-05-29 RX ADMIN — PREGABALIN 1000 MICROGRAM(S): 225 CAPSULE ORAL at 11:50

## 2019-05-29 RX ADMIN — PANTOPRAZOLE SODIUM 40 MILLIGRAM(S): 20 TABLET, DELAYED RELEASE ORAL at 06:22

## 2019-05-29 RX ADMIN — TIOTROPIUM BROMIDE 1 CAPSULE(S): 18 CAPSULE ORAL; RESPIRATORY (INHALATION) at 08:38

## 2019-05-29 RX ADMIN — Medication 250 MILLIGRAM(S): at 05:50

## 2019-05-29 NOTE — PROGRESS NOTE ADULT - SUBJECTIVE AND OBJECTIVE BOX
CHIEF COMPLAINT: NAD.       HISTORY OF PRESENT ILLNESS  60 yo female with PMHx of MVP, chronic SDH, interstitial lung disease/pneumothorax, pulmonary nodules, meniere's, non hodgkins lymphoma (SCD/XRT/chemo at [facility] 2003), TIA, tachycardia, occipital neuralgia, and neuropathy.  Patient was initially admitted to [facility] on 3/13/19 with acute onset of b/l UE weakness associated with nausea, blurry vision, and HA. CT head showed chronic B/L frontal SDH. The rest of the workup was negative. CVA/TIA r/out.  Pt was discharged home but on 3/17 had apparent seizure.  She was admitted to [facility] and intubated and placed on depakote.  CTA H/N, MRI, EEG unremarkable.  ID Recommended LP for fever; family deferred.  Transferred to [facility] on 3/19/19.  MRI brain showed cerebral ischemia. The rest of the workup was negative, this including CSF studies. EEG neg seizures. (Evaluated by lymphoma specialist/onco, PET 4/5/19 r/o recurrence of lymphoma. Increased signal at base of tongue to follow up outpt ENT). Additionally, course complicated with pneumothorax, pulmonary consulted, no interventions, self resolved.  Tachycardia - per pulm, related related to chronic lung disease.  GI consulted for rectal pain; diagnosed c anal fissure/constipation, bowel regimen started. KUB 4/12/19 No stool, no free air, no distended loops of small bowel, fibroids. Refused further workup.   Other issues while admitted:  Hypotension - home diltiazem held per cardio  dizziness - sporadic, self resolving.  Pt deferred MRI.  May be 2/2 to hx of Meniere's disease.      *TTE 3/18/19 EF 60%, mod-severe MR.   MRI brain 3/29/19 cortically based restricted diffusion within R>L frontoparietal region as well as additional scattered small foci, may represent evolution of a recent ischemia vs improving encephalitis. Patient medically optimized and cleared for discharge to acute rehab at [facility] on 4/19/19.   At [facility] patient presented with rectal pain, fluctuating tachycardia 110-150 at rest and orthostatic hypotension. GI, hematology, cardiology and pulmonary evaluation requested. Rectal bleeding developed in the morning of 4/21/19 with severe hypotension and tachycardia. RRT called. Patient transferred to ICU. Patient's course in ICU reviewed and discussed with staff. Investigations, current lab results and recent treatments also reviewed and discussed. Patient was attempted to be evaluated by PT twice but unable to tolerate evaluation due to severe tachycardia up to 150 at rest and symptomatic orthostatic hypotension as low as 80s. Patient is preparing for colonoscopy today to evaluate the source of rectal bleed. S/p multiple PRBC transfusions. Presently on IV antibiotics and IV steroids. (09 May 2019 12:11)      PAST MEDICAL & SURGICAL HISTORY:  Interstitial lung disease: on home o2 prn  NHL (non-Hodgkin's lymphoma): Agem 45 sp chemo/rt/stem cell  Transient cerebral ischemia, unspecified type  Mitral prolapse  History of tonsillectomy  History of appendectomy       REVIEW OF SYMPTOMS  [X] Constitutional WNL     [X] Cardio WNL            [X] Resp WNL                            [X]  WNL             [X] Endo WNL                     [X] Skin WNL                 [X] MSK WNL                  VITALS  Vital Signs Last 24 Hrs  T(C): 36.4 (29 May 2019 07:34), Max: 36.6 (28 May 2019 19:47)  T(F): 97.5 (29 May 2019 07:34), Max: 97.9 (28 May 2019 19:47)  HR: 97 (29 May 2019 08:38) (67 - 97)  BP: 111/68 (29 May 2019 07:34) (10/73 - 111/68)  BP(mean): --  RR: 16 (29 May 2019 07:34) (16 - 16)  SpO2: 100% (29 May 2019 08:38) (100% - 100%)    PHYSICAL EXAM  Constitutional - NAD  HEENT - NCAT, EOMI  Neck - Supple, No limited ROM  Chest - No wheeze, No rhonchi, No crackles  Cardiovascular - RRR, S1S2, No murmurs  Abdomen - BS+, Soft, NTND  Extremities - No C/C/E, No calf tenderness   Skin-no rash      Neurologic Exam - no new deficits this am.                    Psychiatric - Mood improved today, Affect WNL       FUNCTIONAL PROGRESS  Gait - mod A  ADLs - mod A  Transfers - mod A  Functional transfer - mod A      RECENT LABS                        9.6    5.99  )-----------( 408      ( 29 May 2019 06:00 )             29.7     05-29    141  |  104  |  16  ----------------------------<  93  4.3   |  28  |  0.75    Ca    9.0      29 May 2019 06:00    TPro  5.9<L>  /  Alb  2.4<L>  /  TBili  0.2  /  DBili  x   /  AST  11  /  ALT  14  /  AlkPhos  72  05-29      LIVER FUNCTIONS - ( 29 May 2019 06:00 )  Alb: 2.4 g/dL / Pro: 5.9 g/dL / ALK PHOS: 72 U/L / ALT: 14 U/L DA / AST: 11 U/L / GGT: x                 Valproic Acid Level, Serum: 38 ug/mL (05-10-19 @ 14:31)  Valproic Acid Level, Serum: 60 ug/mL (04-22-19 @ 13:40)            RADIOLOGY/OTHER RESULTS      CURRENT MEDICATIONS  MEDICATIONS  (STANDING):  aspirin enteric coated 81 milliGRAM(s) Oral daily  buDESOnide  80 MICROgram(s)/formoterol 4.5 MICROgram(s) Inhaler 2 Puff(s) Inhalation two times a day  cyanocobalamin 1000 MICROGram(s) Oral daily  enoxaparin Injectable 40 milliGRAM(s) SubCutaneous daily  lidocaine   Patch 1 Patch Transdermal <User Schedule>  metoprolol tartrate 25 milliGRAM(s) Oral two times a day  mirtazapine 7.5 milliGRAM(s) Oral at bedtime  pantoprazole    Tablet 40 milliGRAM(s) Oral before breakfast  tiotropium 18 MICROgram(s) Capsule 1 Capsule(s) Inhalation daily  valproic acid 250 milliGRAM(s) Oral every 12 hours  zinc oxide 20% Ointment 1 Application(s) Topical daily    MEDICATIONS  (PRN):  acetaminophen   Tablet .. 650 milliGRAM(s) Oral every 6 hours PRN Moderate Pain (4 - 6)  aluminum hydroxide/magnesium hydroxide/simethicone Suspension 30 milliLiter(s) Oral every 6 hours PRN Dyspepsia  ondansetron    Tablet 4 milliGRAM(s) Oral every 6 hours PRN Nausea and/or Vomiting  ondansetron    Tablet 4 milliGRAM(s) Oral every 6 hours PRN Nausea      ASSESSMENT & PLAN      GI/Bowel Management - Amitiza   Management - Toilet Q2  Skin - Turn Q2  Pain - Tylenol PRN  DVT PPX - Lovenox     Continue comprehensive acute rehab program consisting of 3hrs/day of OT/PT and SLP.

## 2019-05-29 NOTE — PROGRESS NOTE ADULT - SUBJECTIVE AND OBJECTIVE BOX
Patient is a 59y old  Female who presents with a chief complaint of debility/functional deficits (29 May 2019 12:20)      Patient seen and examined at bedside, no events overnight, in no acute distress.     ALLERGIES:  IV Contrast (Anaphylaxis)  shellfish. (Anaphylaxis)    MEDICATIONS:  aluminum hydroxide/magnesium hydroxide/simethicone Suspension 30 milliLiter(s) Oral every 6 hours PRN  buDESOnide  80 MICROgram(s)/formoterol 4.5 MICROgram(s) Inhaler 2 Puff(s) Inhalation two times a day  enoxaparin Injectable 40 milliGRAM(s) SubCutaneous daily  metoprolol tartrate 25 milliGRAM(s) Oral two times a day  mirtazapine 7.5 milliGRAM(s) Oral at bedtime  ondansetron    Tablet 4 milliGRAM(s) Oral every 6 hours PRN  ondansetron    Tablet 4 milliGRAM(s) Oral every 6 hours PRN    Vital Signs Last 24 Hrs  T(C): 36.4 (29 May 2019 07:34), Max: 36.6 (28 May 2019 19:47)  T(F): 97.5 (29 May 2019 07:34), Max: 97.9 (28 May 2019 19:47)  HR: 97 (29 May 2019 08:38) (67 - 97)  BP: 111/68 (29 May 2019 07:34) (10/73 - 111/68)  BP(mean): --  RR: 16 (29 May 2019 07:34) (16 - 16)  SpO2: 100% (29 May 2019 08:38) (100% - 100%)  I&O's Summary    29 May 2019 07:01  -  29 May 2019 12:43  --------------------------------------------------------  IN: 300 mL / OUT: 600 mL / NET: -300 mL          PAST MEDICAL & SURGICAL HISTORY:  Interstitial lung disease: on home o2 prn  NHL (non-Hodgkin's lymphoma): Agem 45 sp chemo/rt/stem cell  Transient cerebral ischemia, unspecified type  Mitral prolapse  History of tonsillectomy  History of appendectomy      Home Medications:  acetaminophen 325 mg oral tablet: 2 tab(s) orally every 6 hours, As needed, Mild Pain (1 - 3) (2019 05:07)  ALPRAZolam 0.5 mg oral tablet: 0.5 tab(s) orally 3 times a day (2019 05:07)  aspirin 81 mg oral delayed release tablet: 1 tab(s) orally once a day (07 May 2019 09:33)  budesonide 0.25 mg/2 mL inhalation suspension:  inhaled  (2019 05:07)  cholecalciferol oral tablet: 2000 unit(s) orally once a day (2019 05:07)  cyanocobalamin 1000 mcg oral tablet: 1 tab(s) orally once a day (2019 05:07)  docusate sodium 100 mg oral capsule: 1 cap(s) orally once a day, As needed, Constipation (07 May 2019 09:33)  ipratropium-albuterol 0.5 mg-2.5 mg/3 mLinhalation solution: 3 milliliter(s) inhaled every 6 hours, As needed, Shortness of Breath and/or Wheezing (07 May 2019 09:33)  melatonin 3 mg oral tablet: 1 tab(s) orally once a day (at bedtime), As needed, Sleep (2019 05:07)  metoprolol tartrate 25 mg oral tablet: 1 tab(s) orally 2 times a day (07 May 2019 09:33)  Multiple Vitamins oral tablet: 1 tab(s) orally once a day (07 May 2019 09:33)  ondansetron 4 mg oral tablet, disintegratin tab(s) orally every 6 hours, As needed, Nausea and/or Vomiting (2019 05:07)  pantoprazole 40 mg intravenous injection: 40 milligram(s) intravenous every 12 hours (2019 05:07)  senna oral tablet: 2 tab(s) orally once a day (at bedtime) (07 May 2019 09:33)  tiotropium 18 mcg inhalation capsule: 1 cap(s) inhaled once a day (2019 05:07)  valproic acid 250 mg oral capsule: 2 cap(s) orally every 12 hours (07 May 2019 09:33)  zinc oxide 20% topical ointment: 1 application topically once a day (2019 05:07)      PHYSICAL EXAM:  General: NAD, A/O x3  ENT: MMM  Neck: Supple, No JVD  Lungs: Clear to percussion bilaterally  Cardio: RRR, S1/S2, No murmurs  Abdomen: Soft, Nontender, Nondistended; Bowel sounds present  Extremities: No clubbing, cyanosis, or edema  Neuro: no new deficits     LABS:                        9.6    5.99  )-----------( 408      ( 29 May 2019 06:00 )             29.7         141  |  104  |  16  ----------------------------<  93  4.3   |  28  |  0.75    Ca    9.0      29 May 2019 06:00    TPro  5.9  /  Alb  2.4  /  TBili  0.2  /  DBili  x   /  AST  11  /  ALT  14  /  AlkPhos  72          03-13 Chol 193 mg/dL  mg/dL HDL 62 mg/dL Trig 112 mg/dL    RADIOLOGY & ADDITIONAL TESTS: no new tests     Care Discussed with Consultants/Other Providers: PM&R team

## 2019-05-30 DIAGNOSIS — K58.9 IRRITABLE BOWEL SYNDROME WITHOUT DIARRHEA: ICD-10-CM

## 2019-05-30 PROCEDURE — 99232 SBSQ HOSP IP/OBS MODERATE 35: CPT

## 2019-05-30 PROCEDURE — 99233 SBSQ HOSP IP/OBS HIGH 50: CPT

## 2019-05-30 RX ORDER — SODIUM CHLORIDE 9 MG/ML
500 INJECTION INTRAMUSCULAR; INTRAVENOUS; SUBCUTANEOUS ONCE
Refills: 0 | Status: COMPLETED | OUTPATIENT
Start: 2019-05-30 | End: 2019-05-30

## 2019-05-30 RX ORDER — METOPROLOL TARTRATE 50 MG
12.5 TABLET ORAL EVERY 12 HOURS
Refills: 0 | Status: DISCONTINUED | OUTPATIENT
Start: 2019-05-30 | End: 2019-06-06

## 2019-05-30 RX ADMIN — ENOXAPARIN SODIUM 40 MILLIGRAM(S): 100 INJECTION SUBCUTANEOUS at 11:53

## 2019-05-30 RX ADMIN — SODIUM CHLORIDE 1000 MILLILITER(S): 9 INJECTION INTRAMUSCULAR; INTRAVENOUS; SUBCUTANEOUS at 11:15

## 2019-05-30 RX ADMIN — PREGABALIN 1000 MICROGRAM(S): 225 CAPSULE ORAL at 11:54

## 2019-05-30 RX ADMIN — Medication 650 MILLIGRAM(S): at 03:33

## 2019-05-30 RX ADMIN — Medication 650 MILLIGRAM(S): at 03:53

## 2019-05-30 RX ADMIN — PANTOPRAZOLE SODIUM 40 MILLIGRAM(S): 20 TABLET, DELAYED RELEASE ORAL at 06:05

## 2019-05-30 RX ADMIN — ONDANSETRON 4 MILLIGRAM(S): 8 TABLET, FILM COATED ORAL at 11:11

## 2019-05-30 RX ADMIN — Medication 20 MILLIGRAM(S): at 17:04

## 2019-05-30 RX ADMIN — Medication 250 MILLIGRAM(S): at 17:04

## 2019-05-30 RX ADMIN — TIOTROPIUM BROMIDE 1 CAPSULE(S): 18 CAPSULE ORAL; RESPIRATORY (INHALATION) at 08:19

## 2019-05-30 RX ADMIN — Medication 250 MILLIGRAM(S): at 06:05

## 2019-05-30 NOTE — PROGRESS NOTE ADULT - SUBJECTIVE AND OBJECTIVE BOX
INTERVAL HPI/OVERNIGHT EVENTS:  HPI:  58 y/o female PMHx of MVP, chronic SDH, interstitial lung disease/pneumothorax, pulmonary nodules, meniere's, non hodgkins lymphoma (SCD/XRT/chemo at [facility] 2003), TIA, tachycardia, occipital neuralgia, and neuropathy. Patient seen and examined on rehab this morning. She complains of abdominal cramping and loose stool.     MEDICATIONS  (STANDING):  aspirin enteric coated 81 milliGRAM(s) Oral daily  buDESOnide  80 MICROgram(s)/formoterol 4.5 MICROgram(s) Inhaler 2 Puff(s) Inhalation two times a day  cyanocobalamin 1000 MICROGram(s) Oral daily  dicyclomine 20 milliGRAM(s) Oral four times a day before meals  enoxaparin Injectable 40 milliGRAM(s) SubCutaneous daily  lidocaine   Patch 1 Patch Transdermal <User Schedule>  metoprolol tartrate 12.5 milliGRAM(s) Oral every 12 hours  mirtazapine 7.5 milliGRAM(s) Oral at bedtime  pantoprazole    Tablet 40 milliGRAM(s) Oral before breakfast  tiotropium 18 MICROgram(s) Capsule 1 Capsule(s) Inhalation daily  valproic acid 250 milliGRAM(s) Oral every 12 hours  zinc oxide 20% Ointment 1 Application(s) Topical daily    MEDICATIONS  (PRN):  acetaminophen   Tablet .. 650 milliGRAM(s) Oral every 6 hours PRN Moderate Pain (4 - 6)  aluminum hydroxide/magnesium hydroxide/simethicone Suspension 30 milliLiter(s) Oral every 6 hours PRN Dyspepsia  ondansetron    Tablet 4 milliGRAM(s) Oral every 6 hours PRN Nausea and/or Vomiting  ondansetron    Tablet 4 milliGRAM(s) Oral every 6 hours PRN Nausea      Allergies    IV Contrast (Anaphylaxis)  shellfish. (Anaphylaxis)    Intolerances        PHYSICAL EXAM:   Vital Signs:  Vital Signs Last 24 Hrs  T(C): 36.4 (30 May 2019 11:10), Max: 36.4 (30 May 2019 11:10)  T(F): 97.6 (30 May 2019 11:10), Max: 97.6 (30 May 2019 11:10)  HR: 78 (30 May 2019 12:15) (75 - 96)  BP: 98/52 (30 May 2019 12:15) (95/61 - 106/75)  BP(mean): --  RR: 14 (30 May 2019 12:15) (14 - 15)  SpO2: 97% (30 May 2019 12:15) (96% - 98%)  Daily     Daily I&O's Summary    29 May 2019 07:01  -  30 May 2019 07:00  --------------------------------------------------------  IN: 600 mL / OUT: 900 mL / NET: -300 mL      GENERAL:  Appears stated age,  HEENT:  NC/AT,  conjunctivae clear and pink  CHEST:  Full & symmetric excursion, no increased effort, breath sounds clear  HEART:  Regular rhythm, S1, S2  ABDOMEN:  Soft, non-tender, non-distended, normoactive bowel sounds, rectal exam + hemorrhoids   EXTEREMITIES:  no edema, L side weakness, +pulses   SKIN:  No rash, warm/dry  NEURO:  Alert, oriented,      LABS:                        9.6    5.99  )-----------( 408      ( 29 May 2019 06:00 )             29.7     05-29    141  |  104  |  16  ----------------------------<  93  4.3   |  28  |  0.75    Ca    9.0      29 May 2019 06:00    TPro  5.9<L>  /  Alb  2.4<L>  /  TBili  0.2  /  DBili  x   /  AST  11  /  ALT  14  /  AlkPhos  72  05-29        amylase   lipase  RADIOLOGY & ADDITIONAL TESTS:

## 2019-05-30 NOTE — PROGRESS NOTE ADULT - ASSESSMENT
60 y/o female with multiple medical problems. Patient refusing amitiza. Non-tender abdomen. Complaining of abdominal cramps and loose stool. No fever/chills.

## 2019-05-30 NOTE — PROGRESS NOTE ADULT - SUBJECTIVE AND OBJECTIVE BOX
Patient is a 59y old  Female who presents with a chief complaint of debility/functional deficits (29 May 2019 12:42)      Patient seen and examined at bedside, no events overnight, in no acute distress.     ALLERGIES:  IV Contrast (Anaphylaxis)  shellfish. (Anaphylaxis)    MEDICATIONS:  aluminum hydroxide/magnesium hydroxide/simethicone Suspension 30 milliLiter(s) Oral every 6 hours PRN  buDESOnide  80 MICROgram(s)/formoterol 4.5 MICROgram(s) Inhaler 2 Puff(s) Inhalation two times a day  enoxaparin Injectable 40 milliGRAM(s) SubCutaneous daily  metoprolol tartrate 25 milliGRAM(s) Oral two times a day  mirtazapine 7.5 milliGRAM(s) Oral at bedtime  ondansetron    Tablet 4 milliGRAM(s) Oral every 6 hours PRN  ondansetron    Tablet 4 milliGRAM(s) Oral every 6 hours PRN    Vital Signs Last 24 Hrs  T(C): 36.3 (30 May 2019 07:23), Max: 36.3 (30 May 2019 07:23)  T(F): 97.4 (30 May 2019 07:23), Max: 97.4 (30 May 2019 07:23)  HR: 75 (30 May 2019 07:23) (75 - 86)  BP: 101/69 (30 May 2019 07:23) (101/69 - 106/75)  BP(mean): --  RR: 14 (30 May 2019 07:23) (14 - 15)  SpO2: 98% (30 May 2019 08:20) (96% - 98%)  I&O's Summary    29 May 2019 07:01  -  30 May 2019 07:00  --------------------------------------------------------  IN: 600 mL / OUT: 900 mL / NET: -300 mL          PAST MEDICAL & SURGICAL HISTORY:  Interstitial lung disease: on home o2 prn  NHL (non-Hodgkin's lymphoma): Agem 45 sp chemo/rt/stem cell  Transient cerebral ischemia, unspecified type  Mitral prolapse  History of tonsillectomy  History of appendectomy      Home Medications:  acetaminophen 325 mg oral tablet: 2 tab(s) orally every 6 hours, As needed, Mild Pain (1 - 3) (2019 05:07)  ALPRAZolam 0.5 mg oral tablet: 0.5 tab(s) orally 3 times a day (2019 05:07)  aspirin 81 mg oral delayed release tablet: 1 tab(s) orally once a day (07 May 2019 09:33)  budesonide 0.25 mg/2 mL inhalation suspension:  inhaled  (2019 05:07)  cholecalciferol oral tablet: 2000 unit(s) orally once a day (2019 05:07)  cyanocobalamin 1000 mcg oral tablet: 1 tab(s) orally once a day (2019 05:07)  docusate sodium 100 mg oral capsule: 1 cap(s) orally once a day, As needed, Constipation (07 May 2019 09:33)  ipratropium-albuterol 0.5 mg-2.5 mg/3 mLinhalation solution: 3 milliliter(s) inhaled every 6 hours, As needed, Shortness of Breath and/or Wheezing (07 May 2019 09:33)  melatonin 3 mg oral tablet: 1 tab(s) orally once a day (at bedtime), As needed, Sleep (2019 05:07)  metoprolol tartrate 25 mg oral tablet: 1 tab(s) orally 2 times a day (07 May 2019 09:33)  Multiple Vitamins oral tablet: 1 tab(s) orally once a day (07 May 2019 09:33)  ondansetron 4 mg oral tablet, disintegratin tab(s) orally every 6 hours, As needed, Nausea and/or Vomiting (2019 05:07)  pantoprazole 40 mg intravenous injection: 40 milligram(s) intravenous every 12 hours (2019 05:07)  senna oral tablet: 2 tab(s) orally once a day (at bedtime) (07 May 2019 09:33)  tiotropium 18 mcg inhalation capsule: 1 cap(s) inhaled once a day (2019 05:07)  valproic acid 250 mg oral capsule: 2 cap(s) orally every 12 hours (07 May 2019 09:33)  zinc oxide 20% topical ointment: 1 application topically once a day (2019 05:07)      PHYSICAL EXAM:  General: NAD, A/O x3  ENT: MMM  Neck: Supple, No JVD  Lungs: Clear to percussion bilaterally  Cardio: RRR, S1/S2, No murmurs  Abdomen: Soft, Nontender, Nondistended; Bowel sounds present  Extremities: No clubbing, cyanosis, or edema  Neuro : no new deficits     LABS:                        9.6    5.99  )-----------( 408      ( 29 May 2019 06:00 )             29.7         141  |  104  |  16  ----------------------------<  93  4.3   |  28  |  0.75    Ca    9.0      29 May 2019 06:00    TPro  5.9  /  Alb  2.4  /  TBili  0.2  /  DBili  x   /  AST  11  /  ALT  14  /  AlkPhos  72      03-13 Chol 193 mg/dL  mg/dL HDL 62 mg/dL Trig 112 mg/dL      RADIOLOGY & ADDITIONAL TESTS: no new tests     Care Discussed with Consultants/Other Providers: PM&R team Patient is a 59y old  Female who presents with a chief complaint of debility/functional deficits (29 May 2019 12:42)      Patient seen and examined at bedside, no events overnight, c/o bout of loose stool in the morning.     ALLERGIES:  IV Contrast (Anaphylaxis)  shellfish. (Anaphylaxis)    MEDICATIONS:  aluminum hydroxide/magnesium hydroxide/simethicone Suspension 30 milliLiter(s) Oral every 6 hours PRN  buDESOnide  80 MICROgram(s)/formoterol 4.5 MICROgram(s) Inhaler 2 Puff(s) Inhalation two times a day  enoxaparin Injectable 40 milliGRAM(s) SubCutaneous daily  metoprolol tartrate 25 milliGRAM(s) Oral two times a day  mirtazapine 7.5 milliGRAM(s) Oral at bedtime  ondansetron    Tablet 4 milliGRAM(s) Oral every 6 hours PRN  ondansetron    Tablet 4 milliGRAM(s) Oral every 6 hours PRN    Vital Signs Last 24 Hrs  T(C): 36.3 (30 May 2019 07:23), Max: 36.3 (30 May 2019 07:23)  T(F): 97.4 (30 May 2019 07:23), Max: 97.4 (30 May 2019 07:23)  HR: 75 (30 May 2019 07:23) (75 - 86)  BP: 101/69 (30 May 2019 07:23) (101/69 - 106/75)  BP(mean): --  RR: 14 (30 May 2019 07:23) (14 - 15)  SpO2: 98% (30 May 2019 08:20) (96% - 98%)  I&O's Summary    29 May 2019 07:01  -  30 May 2019 07:00  --------------------------------------------------------  IN: 600 mL / OUT: 900 mL / NET: -300 mL          PAST MEDICAL & SURGICAL HISTORY:  Interstitial lung disease: on home o2 prn  NHL (non-Hodgkin's lymphoma): Agem 45 sp chemo/rt/stem cell  Transient cerebral ischemia, unspecified type  Mitral prolapse  History of tonsillectomy  History of appendectomy      Home Medications:  acetaminophen 325 mg oral tablet: 2 tab(s) orally every 6 hours, As needed, Mild Pain (1 - 3) (2019 05:07)  ALPRAZolam 0.5 mg oral tablet: 0.5 tab(s) orally 3 times a day (2019 05:07)  aspirin 81 mg oral delayed release tablet: 1 tab(s) orally once a day (07 May 2019 09:33)  budesonide 0.25 mg/2 mL inhalation suspension:  inhaled  (2019 05:07)  cholecalciferol oral tablet: 2000 unit(s) orally once a day (2019 05:07)  cyanocobalamin 1000 mcg oral tablet: 1 tab(s) orally once a day (2019 05:07)  docusate sodium 100 mg oral capsule: 1 cap(s) orally once a day, As needed, Constipation (07 May 2019 09:33)  ipratropium-albuterol 0.5 mg-2.5 mg/3 mLinhalation solution: 3 milliliter(s) inhaled every 6 hours, As needed, Shortness of Breath and/or Wheezing (07 May 2019 09:33)  melatonin 3 mg oral tablet: 1 tab(s) orally once a day (at bedtime), As needed, Sleep (2019 05:07)  metoprolol tartrate 25 mg oral tablet: 1 tab(s) orally 2 times a day (07 May 2019 09:33)  Multiple Vitamins oral tablet: 1 tab(s) orally once a day (07 May 2019 09:33)  ondansetron 4 mg oral tablet, disintegratin tab(s) orally every 6 hours, As needed, Nausea and/or Vomiting (2019 05:07)  pantoprazole 40 mg intravenous injection: 40 milligram(s) intravenous every 12 hours (2019 05:07)  senna oral tablet: 2 tab(s) orally once a day (at bedtime) (07 May 2019 09:33)  tiotropium 18 mcg inhalation capsule: 1 cap(s) inhaled once a day (2019 05:07)  valproic acid 250 mg oral capsule: 2 cap(s) orally every 12 hours (07 May 2019 09:33)  zinc oxide 20% topical ointment: 1 application topically once a day (2019 05:07)      PHYSICAL EXAM:  General: NAD, A/O x3  ENT: MMM  Neck: Supple, No JVD  Lungs: Clear to percussion bilaterally  Cardio: RRR, S1/S2, No murmurs  Abdomen: Soft, Nontender, Nondistended; Bowel sounds present  Extremities: No clubbing, cyanosis, or edema  Neuro : no new deficits     LABS:                        9.6    5.99  )-----------( 408      ( 29 May 2019 06:00 )             29.7         141  |  104  |  16  ----------------------------<  93  4.3   |  28  |  0.75    Ca    9.0      29 May 2019 06:00    TPro  5.9  /  Alb  2.4  /  TBili  0.2  /  DBili  x   /  AST  11  /  ALT  14  /  AlkPhos  72      03-13 Chol 193 mg/dL  mg/dL HDL 62 mg/dL Trig 112 mg/dL      RADIOLOGY & ADDITIONAL TESTS: no new tests     Care Discussed with Consultants/Other Providers: PM&R team

## 2019-05-30 NOTE — PROGRESS NOTE ADULT - SUBJECTIVE AND OBJECTIVE BOX
CHIEF COMPLAINT: Offers no complaints this am.       HISTORY OF PRESENT ILLNESS  60 yo female with PMHx of MVP, chronic SDH, interstitial lung disease/pneumothorax, pulmonary nodules, meniere's, non hodgkins lymphoma (SCD/XRT/chemo at [facility] 2003), TIA, tachycardia, occipital neuralgia, and neuropathy.  Patient was initially admitted to [facility] on 3/13/19 with acute onset of b/l UE weakness associated with nausea, blurry vision, and HA. CT head showed chronic B/L frontal SDH. The rest of the workup was negative. CVA/TIA r/out.  Pt was discharged home but on 3/17 had apparent seizure.  She was admitted to [facility] and intubated and placed on depakote.  CTA H/N, MRI, EEG unremarkable.  ID Recommended LP for fever; family deferred.  Transferred to [facility] on 3/19/19.  MRI brain showed cerebral ischemia. The rest of the workup was negative, this including CSF studies. EEG neg seizures. (Evaluated by lymphoma specialist/onco, PET 4/5/19 r/o recurrence of lymphoma. Increased signal at base of tongue to follow up outpt ENT). Additionally, course complicated with pneumothorax, pulmonary consulted, no interventions, self resolved.  Tachycardia - per pulm, related related to chronic lung disease.  GI consulted for rectal pain; diagnosed c anal fissure/constipation, bowel regimen started. KUB 4/12/19 No stool, no free air, no distended loops of small bowel, fibroids. Refused further workup.   Other issues while admitted:  Hypotension - home diltiazem held per cardio  dizziness - sporadic, self resolving.  Pt deferred MRI.  May be 2/2 to hx of Meniere's disease.      *TTE 3/18/19 EF 60%, mod-severe MR.   MRI brain 3/29/19 cortically based restricted diffusion within R>L frontoparietal region as well as additional scattered small foci, may represent evolution of a recent ischemia vs improving encephalitis. Patient medically optimized and cleared for discharge to acute rehab at [facility] on 4/19/19.   At [facility] patient presented with rectal pain, fluctuating tachycardia 110-150 at rest and orthostatic hypotension. GI, hematology, cardiology and pulmonary evaluation requested. Rectal bleeding developed in the morning of 4/21/19 with severe hypotension and tachycardia. RRT called. Patient transferred to ICU. Patient's course in ICU reviewed and discussed with staff. Investigations, current lab results and recent treatments also reviewed and discussed. Patient was attempted to be evaluated by PT twice but unable to tolerate evaluation due to severe tachycardia up to 150 at rest and symptomatic orthostatic hypotension as low as 80s. Patient is preparing for colonoscopy today to evaluate the source of rectal bleed. S/p multiple PRBC transfusions. Presently on IV antibiotics and IV steroids. (09 May 2019 12:11)      PAST MEDICAL & SURGICAL HISTORY:  Interstitial lung disease: on home o2 prn  NHL (non-Hodgkin's lymphoma): Agem 45 sp chemo/rt/stem cell  Transient cerebral ischemia, unspecified type  Mitral prolapse  History of tonsillectomy  History of appendectomy       REVIEW OF SYMPTOMS  [X] Constitutional WNL     [X] Cardio WNL            [X] Resp WNL           [X] GI WNL                          [X]  WNL                   [X] Heme WNL              [X] Endo WNL                     [X] Skin WNL                 [X] MSK WNL             [X] Cognitive WNL             VITALS  Vital Signs Last 24 Hrs  T(C): 36.3 (30 May 2019 07:23), Max: 36.3 (30 May 2019 07:23)  T(F): 97.4 (30 May 2019 07:23), Max: 97.4 (30 May 2019 07:23)  HR: 75 (30 May 2019 07:23) (75 - 86)  BP: 101/69 (30 May 2019 07:23) (101/69 - 106/75)  BP(mean): --  RR: 14 (30 May 2019 07:23) (14 - 15)  SpO2: 98% (30 May 2019 08:20) (96% - 98%)    PHYSICAL EXAM  Constitutional - NAD  HEENT - NCAT, EOMI  Neck - Supple, No limited ROM  Chest - No wheeze, No rhonchi, No crackles  Cardiovascular - RRR, S1S2, No murmurs  Abdomen - BS+, Soft, NTND  Extremities - No C/C/E, No calf tenderness   Skin-no rash      Neurologic Exam - no new deficits this am.                    Psychiatric - Mood improved today, Affect WNL       FUNCTIONAL PROGRESS  Gait - mod A  ADLs - mod A  Transfers - mod A  Functional transfer - mod A      RECENT LABS                        9.6    5.99  )-----------( 408      ( 29 May 2019 06:00 )             29.7     05-29    141  |  104  |  16  ----------------------------<  93  4.3   |  28  |  0.75    Ca    9.0      29 May 2019 06:00    TPro  5.9<L>  /  Alb  2.4<L>  /  TBili  0.2  /  DBili  x   /  AST  11  /  ALT  14  /  AlkPhos  72  05-29      LIVER FUNCTIONS - ( 29 May 2019 06:00 )  Alb: 2.4 g/dL / Pro: 5.9 g/dL / ALK PHOS: 72 U/L / ALT: 14 U/L DA / AST: 11 U/L / GGT: x                 Valproic Acid Level, Serum: 38 ug/mL (05-10-19 @ 14:31)  Valproic Acid Level, Serum: 60 ug/mL (04-22-19 @ 13:40)            RADIOLOGY/OTHER RESULTS      CURRENT MEDICATIONS  MEDICATIONS  (STANDING):  aspirin enteric coated 81 milliGRAM(s) Oral daily  buDESOnide  80 MICROgram(s)/formoterol 4.5 MICROgram(s) Inhaler 2 Puff(s) Inhalation two times a day  cyanocobalamin 1000 MICROGram(s) Oral daily  enoxaparin Injectable 40 milliGRAM(s) SubCutaneous daily  lidocaine   Patch 1 Patch Transdermal <User Schedule>  metoprolol tartrate 25 milliGRAM(s) Oral two times a day  mirtazapine 7.5 milliGRAM(s) Oral at bedtime  pantoprazole    Tablet 40 milliGRAM(s) Oral before breakfast  tiotropium 18 MICROgram(s) Capsule 1 Capsule(s) Inhalation daily  valproic acid 250 milliGRAM(s) Oral every 12 hours  zinc oxide 20% Ointment 1 Application(s) Topical daily    MEDICATIONS  (PRN):  acetaminophen   Tablet .. 650 milliGRAM(s) Oral every 6 hours PRN Moderate Pain (4 - 6)  aluminum hydroxide/magnesium hydroxide/simethicone Suspension 30 milliLiter(s) Oral every 6 hours PRN Dyspepsia  ondansetron    Tablet 4 milliGRAM(s) Oral every 6 hours PRN Nausea and/or Vomiting  ondansetron    Tablet 4 milliGRAM(s) Oral every 6 hours PRN Nausea      ASSESSMENT & PLAN        GI/Bowel Management - Amitiza   Management - Toilet Q2  Skin - Turn Q2  Pain - Tylenol PRN  DVT PPX - Lovenox     Continue comprehensive acute rehab program consisting of 3hrs/day of OT/PT and SLP. CHIEF COMPLAINT: Diarrhea.       HISTORY OF PRESENT ILLNESS  60 yo female with PMHx of MVP, chronic SDH, interstitial lung disease/pneumothorax, pulmonary nodules, meniere's, non hodgkins lymphoma (SCD/XRT/chemo at [facility] 2003), TIA, tachycardia, occipital neuralgia, and neuropathy.  Patient was initially admitted to [facility] on 3/13/19 with acute onset of b/l UE weakness associated with nausea, blurry vision, and HA. CT head showed chronic B/L frontal SDH. The rest of the workup was negative. CVA/TIA r/out.  Pt was discharged home but on 3/17 had apparent seizure.  She was admitted to [facility] and intubated and placed on depakote.  CTA H/N, MRI, EEG unremarkable.  ID Recommended LP for fever; family deferred.  Transferred to [facility] on 3/19/19.  MRI brain showed cerebral ischemia. The rest of the workup was negative, this including CSF studies. EEG neg seizures. (Evaluated by lymphoma specialist/onco, PET 4/5/19 r/o recurrence of lymphoma. Increased signal at base of tongue to follow up outpt ENT). Additionally, course complicated with pneumothorax, pulmonary consulted, no interventions, self resolved.  Tachycardia - per pulm, related related to chronic lung disease.  GI consulted for rectal pain; diagnosed c anal fissure/constipation, bowel regimen started. KUB 4/12/19 No stool, no free air, no distended loops of small bowel, fibroids. Refused further workup.   Other issues while admitted:  Hypotension - home diltiazem held per cardio  dizziness - sporadic, self resolving.  Pt deferred MRI.  May be 2/2 to hx of Meniere's disease.      *TTE 3/18/19 EF 60%, mod-severe MR.   MRI brain 3/29/19 cortically based restricted diffusion within R>L frontoparietal region as well as additional scattered small foci, may represent evolution of a recent ischemia vs improving encephalitis. Patient medically optimized and cleared for discharge to acute rehab at [facility] on 4/19/19.   At [facility] patient presented with rectal pain, fluctuating tachycardia 110-150 at rest and orthostatic hypotension. GI, hematology, cardiology and pulmonary evaluation requested. Rectal bleeding developed in the morning of 4/21/19 with severe hypotension and tachycardia. RRT called. Patient transferred to ICU. Patient's course in ICU reviewed and discussed with staff. Investigations, current lab results and recent treatments also reviewed and discussed. Patient was attempted to be evaluated by PT twice but unable to tolerate evaluation due to severe tachycardia up to 150 at rest and symptomatic orthostatic hypotension as low as 80s. Patient is preparing for colonoscopy today to evaluate the source of rectal bleed. S/p multiple PRBC transfusions. Presently on IV antibiotics and IV steroids. (09 May 2019 12:11)      PAST MEDICAL & SURGICAL HISTORY:  Interstitial lung disease: on home o2 prn  NHL (non-Hodgkin's lymphoma): Agem 45 sp chemo/rt/stem cell  Transient cerebral ischemia, unspecified type  Mitral prolapse  History of tonsillectomy  History of appendectomy       REVIEW OF SYMPTOMS  [X] Constitutional WNL     [X] Cardio WNL            [X] Resp WNL           [X] GI WNL                          [X]  WNL                   [X] Heme WNL              [X] Endo WNL                     [X] Skin WNL                 [X] MSK WNL             [X] Cognitive WNL             VITALS  Vital Signs Last 24 Hrs  T(C): 36.3 (30 May 2019 07:23), Max: 36.3 (30 May 2019 07:23)  T(F): 97.4 (30 May 2019 07:23), Max: 97.4 (30 May 2019 07:23)  HR: 75 (30 May 2019 07:23) (75 - 86)  BP: 101/69 (30 May 2019 07:23) (101/69 - 106/75)  BP(mean): --  RR: 14 (30 May 2019 07:23) (14 - 15)  SpO2: 98% (30 May 2019 08:20) (96% - 98%)    PHYSICAL EXAM  Constitutional - NAD  HEENT - NCAT, EOMI  Neck - Supple, No limited ROM  Chest - No wheeze, No rhonchi, No crackles  Cardiovascular - RRR, S1S2, No murmurs  Abdomen - BS+, Soft, NTND  Extremities - No C/C/E, No calf tenderness   Skin-no rash      Neurologic Exam - no new deficits this am.                    Psychiatric - Mood improved today, Affect WNL       FUNCTIONAL PROGRESS  Gait - mod A  ADLs - mod A  Transfers - mod A  Functional transfer - mod A      RECENT LABS                        9.6    5.99  )-----------( 408      ( 29 May 2019 06:00 )             29.7     05-29    141  |  104  |  16  ----------------------------<  93  4.3   |  28  |  0.75    Ca    9.0      29 May 2019 06:00    TPro  5.9<L>  /  Alb  2.4<L>  /  TBili  0.2  /  DBili  x   /  AST  11  /  ALT  14  /  AlkPhos  72  05-29      LIVER FUNCTIONS - ( 29 May 2019 06:00 )  Alb: 2.4 g/dL / Pro: 5.9 g/dL / ALK PHOS: 72 U/L / ALT: 14 U/L DA / AST: 11 U/L / GGT: x                 Valproic Acid Level, Serum: 38 ug/mL (05-10-19 @ 14:31)  Valproic Acid Level, Serum: 60 ug/mL (04-22-19 @ 13:40)            RADIOLOGY/OTHER RESULTS      CURRENT MEDICATIONS  MEDICATIONS  (STANDING):  aspirin enteric coated 81 milliGRAM(s) Oral daily  buDESOnide  80 MICROgram(s)/formoterol 4.5 MICROgram(s) Inhaler 2 Puff(s) Inhalation two times a day  cyanocobalamin 1000 MICROGram(s) Oral daily  enoxaparin Injectable 40 milliGRAM(s) SubCutaneous daily  lidocaine   Patch 1 Patch Transdermal <User Schedule>  metoprolol tartrate 25 milliGRAM(s) Oral two times a day  mirtazapine 7.5 milliGRAM(s) Oral at bedtime  pantoprazole    Tablet 40 milliGRAM(s) Oral before breakfast  tiotropium 18 MICROgram(s) Capsule 1 Capsule(s) Inhalation daily  valproic acid 250 milliGRAM(s) Oral every 12 hours  zinc oxide 20% Ointment 1 Application(s) Topical daily    MEDICATIONS  (PRN):  acetaminophen   Tablet .. 650 milliGRAM(s) Oral every 6 hours PRN Moderate Pain (4 - 6)  aluminum hydroxide/magnesium hydroxide/simethicone Suspension 30 milliLiter(s) Oral every 6 hours PRN Dyspepsia  ondansetron    Tablet 4 milliGRAM(s) Oral every 6 hours PRN Nausea and/or Vomiting  ondansetron    Tablet 4 milliGRAM(s) Oral every 6 hours PRN Nausea      ASSESSMENT & PLAN        GI/Bowel Management - Amitiza   Management - Toilet Q2  Skin - Turn Q2  Pain - Tylenol PRN  DVT PPX - Lovenox     Continue comprehensive acute rehab program consisting of 3hrs/day of OT/PT and SLP.

## 2019-05-30 NOTE — PROGRESS NOTE ADULT - ASSESSMENT
59 year old female s/p suspected CVA vs. encephalitis admitted to rehab with left-sided weakness and functional deficits.     #r/o CVA  continue ASA  GI ppx    #Tachycardia - resolved  continue metoprolol BID, monitor closely    #ILD  pulmicort, xopenex, spiriva  on home O2 PRN.     #Seizure  Continue Depakote, seizure ppx, f/u neurology    #constipation/ rectal pain  GI following, continue bowel regimen for constipation  pain control for rectal pain    #back pain  Tylenol and lidocaine patch.     #Vitamin B12 deficiency  patient reports she is vegetarian  continue cyanocobalamin 1000 MICROGram(s) Oral daily    #anxiety  -Xanax if needed  -Remeron   -Psychiatry evaluation    #GERD  continue pantoprazole     #DVT ppx  Lovenox 59 year old female s/p suspected CVA vs. encephalitis admitted to rehab with left-sided weakness and functional deficits.     #r/o CVA  continue ASA  GI ppx    #Tachycardia - HR - wnl  continue metoprolol BID at a reduse dose 12.5 mg po q12hrs 2/2 hypotension   monitor closely    #ILD  pulmicort, xopenex, spiriva  on home O2 PRN.     #Seizure  Continue Depakote, seizure ppx, f/u neurology    #Hypotension   SBP=90 2/2 diarrhea (IBS exacerbation)  Give NS bolus 500 cc and then re-eval  Decrease metoprolol tartarate to 12.5 mg po q12hrs     #IBS with alternating periods of constipation and diarrhea  pt. with loose stools this am, associated with hypotension  recommend bentyl (pt. agrees); pt. refused amitiza when recommended by GI  will reconsult GI    #back pain  Tylenol and lidocaine patch.     #Vitamin B12 deficiency  patient reports she is vegetarian  continue cyanocobalamin 1000 MICROGram(s) Oral daily    #anxiety  -Xanax if needed  -Remeron   -Psychiatry evaluation    #GERD  continue pantoprazole     #DVT ppx  Lovenox

## 2019-05-30 NOTE — PROGRESS NOTE ADULT - ATTENDING COMMENTS
Patient seen and examined . Agree with assessment and plan as above.  Patient now complaining of loose stool; refusing Amitiza.  VSS.  Abdomen soft, nontender BS+    Hold Amitiza for now  Dicyclomine prn for abdominal cramping  Diet as tolerated

## 2019-05-30 NOTE — PROGRESS NOTE ADULT - ASSESSMENT
60 yo RHWF female with complex medical history  s/p suspected multiple  CVAs vs. encephalitis being readmitted to acute rehabilitation unit  with spastic quadriparesis, more pronounced in left UE, sensory impairment, gait impairment  and functional deficits.    #r/o CVA  ASA continues c GI ppx.     #Leukocytosis  -resolved, afebrile, ID and Medicine are following.  -will continue to monitor off ABx, non toxic.     #tachycardia  -Metoprolol BID. Tachycardia well controlled.     #ILD  -Pulmicort, Spiriva, pulmonary evaluation completed.  -on home O2 PRN.     #Seizure  Continue Depakote, seizure ppx.     #Rectal pain/hx of bleed  GI is following, case discussed, will follow recommendations, Amitiza-refuses.     # Anxiety  -Xanax as needed  -Remeron had been added for insomnia and to improve appetite    #debility  Lovenox SQ cleared by GI, Nutrition eval. 58 yo RHWF female with complex medical history  s/p suspected multiple  CVAs vs. encephalitis being readmitted to acute rehabilitation unit  with spastic quadriparesis, more pronounced in left UE, sensory impairment, gait impairment  and functional deficits.    #r/o CVA  ASA continues c GI ppx.     #Diarrhea  -watery, GI evaluation, labs am, IVF bolus for BP 90s.     #Leukocytosis  -resolved, afebrile, ID and Medicine are following.  -will continue to monitor off ABx, non toxic.     #tachycardia  -Metoprolol BID-dose decreased for hypotension. Tachycardia well controlled. HR<90, missed multiple doses due to BP parameters.     #ILD  -Pulmicort, Spiriva, pulmonary evaluation completed.  -on home O2 PRN.     #Seizure  Continue Depakote, seizure ppx.     #Rectal pain/hx of bleed  GI is following, case discussed, will follow recommendations, Amitiza-refuses.     # Anxiety  -Xanax as needed  -Remeron had been added for insomnia and to improve appetite    #debility  Lovenox SQ cleared by GI, Nutrition eval.

## 2019-05-30 NOTE — CHART NOTE - NSCHARTNOTEFT_GEN_A_CORE
Nutrition Follow Up Note  Hospital Course (Per Electronic Medical Record):   Source: Medical Record [X] Patient [X]    Diet: Lacto -Ovo vegetarian, Ensure pudding TID    Patient noted with good po consuming between % of meals. patient is not consuming po nutrition supplement , requested order to discontinue , order still pending.     Current Weight: (5/27) 119.4/54.2kg, stable weight.    Pertinent Medications: MEDICATIONS  (STANDING):  aspirin enteric coated 81 milliGRAM(s) Oral daily  buDESOnide  80 MICROgram(s)/formoterol 4.5 MICROgram(s) Inhaler 2 Puff(s) Inhalation two times a day  cyanocobalamin 1000 MICROGram(s) Oral daily  enoxaparin Injectable 40 milliGRAM(s) SubCutaneous daily  lidocaine   Patch 1 Patch Transdermal <User Schedule>  metoprolol tartrate 25 milliGRAM(s) Oral two times a day  mirtazapine 7.5 milliGRAM(s) Oral at bedtime  pantoprazole    Tablet 40 milliGRAM(s) Oral before breakfast  tiotropium 18 MICROgram(s) Capsule 1 Capsule(s) Inhalation daily  valproic acid 250 milliGRAM(s) Oral every 12 hours  zinc oxide 20% Ointment 1 Application(s) Topical daily    MEDICATIONS  (PRN):  acetaminophen   Tablet .. 650 milliGRAM(s) Oral every 6 hours PRN Moderate Pain (4 - 6)  aluminum hydroxide/magnesium hydroxide/simethicone Suspension 30 milliLiter(s) Oral every 6 hours PRN Dyspepsia  ondansetron    Tablet 4 milliGRAM(s) Oral every 6 hours PRN Nausea and/or Vomiting  ondansetron    Tablet 4 milliGRAM(s) Oral every 6 hours PRN Nausea      Pertinent Labs:  05-29 Na141 mmol/L Glu 93 mg/dL K+ 4.3 mmol/L Cr  0.75 mg/dL BUN 16 mg/dL 05-29 Alb 2.4 g/dL<L>        Skin: Intact    Edema: none    Last BM: on (5/30)    Estimated Needs:   [X] No Change since Previous Assessment  [X] Recalculated:     Previous Nutrition Diagnosis: Severe malnutrition    Nutrition Diagnosis is [X] Ongoing         New Nutrition Diagnosis: [X] Not Applicable    Interventions:   1. Recommend discontinue po supplement  2. Continue current diet    Monitoring & Evaluation: will monitor:  [X] Weights   [X] PO Intake   [X] Follow Up (Per Protocol)  [X] Tolerance to Diet Prescription       RD to follow as per Nutrition protocol  Ayana Grider RDN

## 2019-05-31 LAB
ALBUMIN SERPL ELPH-MCNC: 2.6 G/DL — LOW (ref 3.3–5)
ALP SERPL-CCNC: 68 U/L — SIGNIFICANT CHANGE UP (ref 40–120)
ALT FLD-CCNC: 13 U/L DA — SIGNIFICANT CHANGE UP (ref 10–45)
ANION GAP SERPL CALC-SCNC: 7 MMOL/L — SIGNIFICANT CHANGE UP (ref 5–17)
AST SERPL-CCNC: 12 U/L — SIGNIFICANT CHANGE UP (ref 10–40)
BILIRUB SERPL-MCNC: 0.1 MG/DL — LOW (ref 0.2–1.2)
BUN SERPL-MCNC: 19 MG/DL — SIGNIFICANT CHANGE UP (ref 7–23)
CALCIUM SERPL-MCNC: 9.5 MG/DL — SIGNIFICANT CHANGE UP (ref 8.4–10.5)
CHLORIDE SERPL-SCNC: 104 MMOL/L — SIGNIFICANT CHANGE UP (ref 96–108)
CO2 SERPL-SCNC: 29 MMOL/L — SIGNIFICANT CHANGE UP (ref 22–31)
CREAT SERPL-MCNC: 0.66 MG/DL — SIGNIFICANT CHANGE UP (ref 0.5–1.3)
GLUCOSE SERPL-MCNC: 84 MG/DL — SIGNIFICANT CHANGE UP (ref 70–99)
HCT VFR BLD CALC: 32.4 % — LOW (ref 34.5–45)
HGB BLD-MCNC: 10.2 G/DL — LOW (ref 11.5–15.5)
MCHC RBC-ENTMCNC: 29.5 PG — SIGNIFICANT CHANGE UP (ref 27–34)
MCHC RBC-ENTMCNC: 31.5 GM/DL — LOW (ref 32–36)
MCV RBC AUTO: 93.6 FL — SIGNIFICANT CHANGE UP (ref 80–100)
NRBC # BLD: 0 /100 WBCS — SIGNIFICANT CHANGE UP (ref 0–0)
PLATELET # BLD AUTO: 449 K/UL — HIGH (ref 150–400)
POTASSIUM SERPL-MCNC: 4.2 MMOL/L — SIGNIFICANT CHANGE UP (ref 3.5–5.3)
POTASSIUM SERPL-SCNC: 4.2 MMOL/L — SIGNIFICANT CHANGE UP (ref 3.5–5.3)
PROT SERPL-MCNC: 6.1 G/DL — SIGNIFICANT CHANGE UP (ref 6–8.3)
RBC # BLD: 3.46 M/UL — LOW (ref 3.8–5.2)
RBC # FLD: 15.8 % — HIGH (ref 10.3–14.5)
SODIUM SERPL-SCNC: 140 MMOL/L — SIGNIFICANT CHANGE UP (ref 135–145)
WBC # BLD: 5.59 K/UL — SIGNIFICANT CHANGE UP (ref 3.8–10.5)
WBC # FLD AUTO: 5.59 K/UL — SIGNIFICANT CHANGE UP (ref 3.8–10.5)

## 2019-05-31 PROCEDURE — 99232 SBSQ HOSP IP/OBS MODERATE 35: CPT

## 2019-05-31 RX ADMIN — Medication 650 MILLIGRAM(S): at 23:32

## 2019-05-31 RX ADMIN — TIOTROPIUM BROMIDE 1 CAPSULE(S): 18 CAPSULE ORAL; RESPIRATORY (INHALATION) at 08:16

## 2019-05-31 RX ADMIN — Medication 20 MILLIGRAM(S): at 10:31

## 2019-05-31 RX ADMIN — Medication 250 MILLIGRAM(S): at 06:07

## 2019-05-31 RX ADMIN — Medication 250 MILLIGRAM(S): at 15:48

## 2019-05-31 RX ADMIN — PREGABALIN 1000 MICROGRAM(S): 225 CAPSULE ORAL at 11:09

## 2019-05-31 RX ADMIN — Medication 650 MILLIGRAM(S): at 23:12

## 2019-05-31 RX ADMIN — ENOXAPARIN SODIUM 40 MILLIGRAM(S): 100 INJECTION SUBCUTANEOUS at 11:09

## 2019-05-31 NOTE — PROGRESS NOTE ADULT - ATTENDING COMMENTS
Improved diarrhea, GI is following  Tolerates therapy well  Stable neurological exam  Discharge plan is in progress

## 2019-05-31 NOTE — PROGRESS NOTE ADULT - SUBJECTIVE AND OBJECTIVE BOX
CHIEF COMPLAINT: No diarrhea.       HISTORY OF PRESENT ILLNESS  60 yo female with PMHx of MVP, chronic SDH, interstitial lung disease/pneumothorax, pulmonary nodules, meniere's, non hodgkins [place] [facility] [place] TIA, tachycardia, occipital neuralgia, and neuropathy.  Patient was initially admitted to [place] on 3/13/19 with acute onset of b/l UE weakness associated with nausea, blurry vision, and HA. CT head showed chronic B/L frontal SDH. The rest of the workup was negative. CVA/TIA r/out.  Pt was discharged home but on 3/17 had apparent seizure.  She was admitted to [facility] and intubated and placed on depakote.  CTA H/N, MRI, EEG unremarkable.  ID Recommended LP for fever; family deferred.  Transferred to [facility] on 3/19/19.  MRI brain showed cerebral ischemia. The rest of the workup was negative, this including CSF studies. EEG neg seizures. (Evaluated by lymphoma specialist/onco, PET 4/5/19 r/o recurrence of lymphoma. Increased signal at base of tongue to follow up outpt ENT). Additionally, course complicated with pneumothorax, pulmonary consulted, no interventions, self resolved.  Tachycardia - per pulm, related related to chronic lung disease.  GI consulted for rectal pain; diagnosed c anal fissure/constipation, bowel regimen started. KUB 4/12/19 No stool, no free air, no distended loops of small bowel, fibroids. Refused further workup.   Other issues while admitted:  Hypotension - home diltiazem held per cardio  dizziness - sporadic, self resolving.  Pt deferred MRI.  May be 2/2 to hx of Meniere's disease.      *TTE 3/18/19 EF 60%, mod-severe MR.   MRI brain 3/29/19 cortically based restricted diffusion within R>L frontoparietal region as well as additional scattered small foci, may represent evolution of a recent ischemia vs improving encephalitis. Patient medically optimized and cleared for discharge to acute rehab at [facility] on 4/19/19.   At [facility] patient presented with rectal pain, fluctuating tachycardia 110-150 at rest and orthostatic hypotension. GI, hematology, cardiology and pulmonary evaluation requested. Rectal bleeding developed in the morning of 4/21/19 with severe hypotension and tachycardia. RRT called. Patient transferred to ICU. Patient's course in ICU reviewed and discussed with staff. Investigations, current lab results and recent treatments also reviewed and discussed. Patient was attempted to be evaluated by PT twice but unable to tolerate evaluation due to severe tachycardia up to 150 at rest and symptomatic orthostatic hypotension as low as 80s. Patient is preparing for colonoscopy today to evaluate the source of rectal bleed. S/p multiple PRBC transfusions. Presently on IV antibiotics and IV steroids. (09 May 2019 12:11)      PAST MEDICAL & SURGICAL HISTORY:  Interstitial lung disease: on home o2 prn  NHL (non-Hodgkin's lymphoma): Agem 45 sp chemo/rt/stem cell  Transient cerebral ischemia, unspecified type  Mitral prolapse  History of tonsillectomy  History of appendectomy       REVIEW OF SYMPTOMS  [X] Constitutional WNL     [X] Cardio WNL            [X] Resp WNL                            [X]  WNL                   [X] Endo WNL                     [X] Skin WNL                 [X] MSK WNL                     [X] Cognitive WNL              VITALS  Vital Signs Last 24 Hrs  T(C): 36.7 (31 May 2019 07:28), Max: 36.7 (30 May 2019 21:04)  T(F): 98 (31 May 2019 07:28), Max: 98 (30 May 2019 21:04)  HR: 87 (31 May 2019 07:28) (73 - 95)  BP: 108/73 (31 May 2019 07:28) (94/60 - 108/73)  BP(mean): --  RR: 14 (31 May 2019 07:28) (14 - 14)  SpO2: 98% (31 May 2019 08:17) (93% - 98%)      PHYSICAL EXAM  Constitutional - Frail  HEENT - NCAT, EOMI  Neck - Supple, No limited ROM  Chest - CTA bilaterally, No wheeze, No rhonchi, No crackles  Cardiovascular - RRR, S1S2, No murmurs  Abdomen - BS+, Soft, NTND  Extremities - No C/C/E, No calf tenderness   Skin-no rash      Neurologic Exam - no new deficits.                    Psychiatric - Mood improved, Affect WNL       FUNCTIONAL PROGRESS  Gait - mod A  ADLs - mod A  Transfers - mod A  Functional transfer - mod A    RECENT LABS                        10.2   5.59  )-----------( 449      ( 31 May 2019 05:05 )             32.4     05-31    140  |  104  |  19  ----------------------------<  84  4.2   |  29  |  0.66    Ca    9.5      31 May 2019 05:05    TPro  6.1  /  Alb  2.6<L>  /  TBili  0.1<L>  /  DBili  x   /  AST  12  /  ALT  13  /  AlkPhos  68  05-31      LIVER FUNCTIONS - ( 31 May 2019 05:05 )  Alb: 2.6 g/dL / Pro: 6.1 g/dL / ALK PHOS: 68 U/L / ALT: 13 U/L DA / AST: 12 U/L / GGT: x                 Valproic Acid Level, Serum: 38 ug/mL (05-10-19 @ 14:31)  Valproic Acid Level, Serum: 60 ug/mL (04-22-19 @ 13:40)            RADIOLOGY/OTHER RESULTS      CURRENT MEDICATIONS  MEDICATIONS  (STANDING):  aspirin enteric coated 81 milliGRAM(s) Oral daily  buDESOnide  80 MICROgram(s)/formoterol 4.5 MICROgram(s) Inhaler 2 Puff(s) Inhalation two times a day  cyanocobalamin 1000 MICROGram(s) Oral daily  dicyclomine 20 milliGRAM(s) Oral four times a day before meals  enoxaparin Injectable 40 milliGRAM(s) SubCutaneous daily  lidocaine   Patch 1 Patch Transdermal <User Schedule>  metoprolol tartrate 12.5 milliGRAM(s) Oral every 12 hours  mirtazapine 7.5 milliGRAM(s) Oral at bedtime  pantoprazole    Tablet 40 milliGRAM(s) Oral before breakfast  tiotropium 18 MICROgram(s) Capsule 1 Capsule(s) Inhalation daily  valproic acid 250 milliGRAM(s) Oral every 12 hours  zinc oxide 20% Ointment 1 Application(s) Topical daily    MEDICATIONS  (PRN):  acetaminophen   Tablet .. 650 milliGRAM(s) Oral every 6 hours PRN Moderate Pain (4 - 6)  aluminum hydroxide/magnesium hydroxide/simethicone Suspension 30 milliLiter(s) Oral every 6 hours PRN Dyspepsia  ondansetron    Tablet 4 milliGRAM(s) Oral every 6 hours PRN Nausea and/or Vomiting  ondansetron    Tablet 4 milliGRAM(s) Oral every 6 hours PRN Nausea      ASSESSMENT & PLAN      GI/Bowel Management - Off Amitiza Bentyl for diarrhea   Management - Toilet Q2  Skin - Turn Q2  Pain - Tylenol PRN  DVT PPX - Lovenox     Continue comprehensive acute rehab program consisting of 3hrs/day of OT/PT and SLP.

## 2019-05-31 NOTE — PROGRESS NOTE ADULT - ASSESSMENT
60 yo RHWF female with complex medical history  s/p suspected multiple  CVAs vs. encephalitis being readmitted to acute rehabilitation unit  with spastic quadriparesis, more pronounced in left UE, sensory impairment, gait impairment  and functional deficits.    #r/o CVA  ASA continues c GI ppx.     #Diarrhea  -none today, GI in, Bentyl per GI plan.     #Leukocytosis  -resolved, afebrile, ID and Medicine are following.  -will continue to monitor off ABx, non toxic.     #tachycardia  -Metoprolol BID-dose decreased for hypotension. Tachycardia well controlled. HR<90, missed multiple doses due to BP parameters.     #ILD  -Pulmicort, Spiriva, pulmonary evaluation completed.  -on home O2 PRN.     #Seizure  Continue Depakote, seizure ppx.     #Rectal pain/hx of bleed  GI is following, case discussed, will follow recommendations.     # Anxiety  -Xanax as needed  -Remeron had been added for insomnia and to improve appetite    #debility  Lovenox SQ cleared by GI, Nutrition eval.

## 2019-06-01 RX ADMIN — Medication 250 MILLIGRAM(S): at 17:29

## 2019-06-01 RX ADMIN — Medication 250 MILLIGRAM(S): at 05:37

## 2019-06-01 RX ADMIN — PREGABALIN 1000 MICROGRAM(S): 225 CAPSULE ORAL at 12:02

## 2019-06-01 RX ADMIN — ENOXAPARIN SODIUM 40 MILLIGRAM(S): 100 INJECTION SUBCUTANEOUS at 12:01

## 2019-06-01 RX ADMIN — TIOTROPIUM BROMIDE 1 CAPSULE(S): 18 CAPSULE ORAL; RESPIRATORY (INHALATION) at 09:38

## 2019-06-01 RX ADMIN — PANTOPRAZOLE SODIUM 40 MILLIGRAM(S): 20 TABLET, DELAYED RELEASE ORAL at 06:30

## 2019-06-01 RX ADMIN — ONDANSETRON 4 MILLIGRAM(S): 8 TABLET, FILM COATED ORAL at 03:54

## 2019-06-01 NOTE — PROGRESS NOTE ADULT - SUBJECTIVE AND OBJECTIVE BOX
Subjective: reports tremors that developed about a week ago.  No pain.  Has not d/w neurologist.  No seizure activity.       REVIEW OF SYSTEMS  Pertinent in last 24 h: Neurological deficits    VITALS  T(C): 36.4 (06-01-19 @ 08:29), Max: 37.3 (05-31-19 @ 19:33)  HR: 98 (06-01-19 @ 09:50) (82 - 98)  BP: 117/78 (06-01-19 @ 08:29) (101/68 - 117/78)  RR: 14 (06-01-19 @ 08:29) (14 - 14)  SpO2: 94% (06-01-19 @ 09:50) (94% - 98%)  Wt(kg): --    Physical Exam:  Constitutional - NAD,  HEENT - NCAT,   Chest - CTA bilaterally,   Cardiovascular - RRR, S1S2,   Abdomen - BS+, Soft, NTND  Extremities - No edema,   Neurologic Exam -    Stable                Cognitive - Awake, Alert,      Motor - left UE weakness--no motor movement.  At least 4/5 strength in right UE and bilat. LEs    Coordination --impaired left UE, mildly impaired right UE  Psychiatric - Dysphoric Mood -    RECENT LABS/IMAGING                        10.2   5.59  )-----------( 449      ( 31 May 2019 05:05 )             32.4     05-31    140  |  104  |  19  ----------------------------<  84  4.2   |  29  |  0.66    Ca    9.5      31 May 2019 05:05    TPro  6.1  /  Alb  2.6<L>  /  TBili  0.1<L>  /  DBili  x   /  AST  12  /  ALT  13  /  AlkPhos  68  05-31            MEDICATIONS   acetaminophen   Tablet .. 650 milliGRAM(s) every 6 hours PRN  aluminum hydroxide/magnesium hydroxide/simethicone Suspension 30 milliLiter(s) every 6 hours PRN  aspirin enteric coated 81 milliGRAM(s) daily  buDESOnide  80 MICROgram(s)/formoterol 4.5 MICROgram(s) Inhaler 2 Puff(s) two times a day  cyanocobalamin 1000 MICROGram(s) daily  dicyclomine 20 milliGRAM(s) four times a day before meals  enoxaparin Injectable 40 milliGRAM(s) daily  lidocaine   Patch 1 Patch <User Schedule>  metoprolol tartrate 12.5 milliGRAM(s) every 12 hours  mirtazapine 7.5 milliGRAM(s) at bedtime  ondansetron    Tablet 4 milliGRAM(s) every 6 hours PRN  ondansetron    Tablet 4 milliGRAM(s) every 6 hours PRN  pantoprazole    Tablet 40 milliGRAM(s) before breakfast  tiotropium 18 MICROgram(s) Capsule 1 Capsule(s) daily  valproic acid 250 milliGRAM(s) every 12 hours  zinc oxide 20% Ointment 1 Application(s) daily      --------------------------------------------------------------------

## 2019-06-01 NOTE — PROGRESS NOTE ADULT - ASSESSMENT
60 yo RHWF female with complex medical history  s/p suspected multiple  CVAs vs. encephalitis being readmitted to acute rehabilitation unit  with spastic quadriparesis, more pronounced in left UE, sensory impairment, gait impairment  and functional deficits.    #r/o CVA  ASA continues c GI ppx.     Tremors  --differ to neurology for management  does not appear to be worsening    #Diarrhea  -, Bentyl per GI plan.         #tachycardia  resolved  -Metoprolol BID. Tachycardia well controlled. HR<90,     #ILD  -Pulmicort, Spiriva, pulmonary evaluation completed.  -on home O2 PRN.     #Seizure  Continue Depakote, seizure ppx.         # Anxiety  -Xanax as needed  -Remeron had been added for insomnia and to improve appetite    #debility  Lovenox SQ cleared by GI, Nutrition eval.

## 2019-06-02 RX ADMIN — Medication 650 MILLIGRAM(S): at 12:20

## 2019-06-02 RX ADMIN — ENOXAPARIN SODIUM 40 MILLIGRAM(S): 100 INJECTION SUBCUTANEOUS at 11:43

## 2019-06-02 RX ADMIN — Medication 650 MILLIGRAM(S): at 11:41

## 2019-06-02 RX ADMIN — Medication 650 MILLIGRAM(S): at 21:53

## 2019-06-02 RX ADMIN — PREGABALIN 1000 MICROGRAM(S): 225 CAPSULE ORAL at 11:43

## 2019-06-02 RX ADMIN — Medication 250 MILLIGRAM(S): at 17:21

## 2019-06-02 RX ADMIN — Medication 650 MILLIGRAM(S): at 22:15

## 2019-06-02 RX ADMIN — TIOTROPIUM BROMIDE 1 CAPSULE(S): 18 CAPSULE ORAL; RESPIRATORY (INHALATION) at 10:07

## 2019-06-02 RX ADMIN — PANTOPRAZOLE SODIUM 40 MILLIGRAM(S): 20 TABLET, DELAYED RELEASE ORAL at 06:00

## 2019-06-02 RX ADMIN — Medication 250 MILLIGRAM(S): at 06:00

## 2019-06-02 NOTE — PROVIDER CONTACT NOTE (OTHER) - ACTION/TREATMENT ORDERED:
Dr. Menjivar made aware. Closely monitored. Seizure precaution observed.
EKG ordered, showed Normal Sinus rhythm, MD reviewed EKG.
ordered sitz bath and cream
EKG done shows Sinus Tachycardia with Vent rate - 101 bpm. MD notified of results, continue to monitor.

## 2019-06-02 NOTE — PROGRESS NOTE ADULT - SUBJECTIVE AND OBJECTIVE BOX
Subjective: No new complaints or overnight issues      REVIEW OF SYSTEMS  Pertinent in last 24 h: Neurological deficits    VITALS  T(C): 36.6 (06-02-19 @ 08:01), Max: 36.7 (06-01-19 @ 20:42)  HR: 92 (06-02-19 @ 10:09) (91 - 98)  BP: 100/68 (06-02-19 @ 08:01) (100/68 - 108/75)  RR: 14 (06-02-19 @ 08:01) (14 - 14)  SpO2: 96% (06-02-19 @ 10:09) (96% - 97%)  Wt(kg): --    Physical Exam:  -Constitutional - NAD,  HEENT - NCAT,   Chest - CTA bilaterally,   Cardiovascular - RRR, S1S2,   Abdomen - BS+, Soft, NTND  Extremities - No edema,   Neurologic Exam -    Stable                Cognitive - Awake, Alert,      Motor - left UE weakness--no motor movement.  At least 4/5 strength in right UE and bilat. LEs    Coordination --impaired left UE, mildly impaired right UE  Psychiatric - Dysphoric Mood -    RECENT LABS/IMAGING                  MEDICATIONS   acetaminophen   Tablet .. 650 milliGRAM(s) every 6 hours PRN  aluminum hydroxide/magnesium hydroxide/simethicone Suspension 30 milliLiter(s) every 6 hours PRN  aspirin enteric coated 81 milliGRAM(s) daily  buDESOnide  80 MICROgram(s)/formoterol 4.5 MICROgram(s) Inhaler 2 Puff(s) two times a day  cyanocobalamin 1000 MICROGram(s) daily  dicyclomine 20 milliGRAM(s) four times a day before meals  enoxaparin Injectable 40 milliGRAM(s) daily  lidocaine   Patch 1 Patch <User Schedule>  metoprolol tartrate 12.5 milliGRAM(s) every 12 hours  mirtazapine 7.5 milliGRAM(s) at bedtime  ondansetron    Tablet 4 milliGRAM(s) every 6 hours PRN  ondansetron    Tablet 4 milliGRAM(s) every 6 hours PRN  pantoprazole    Tablet 40 milliGRAM(s) before breakfast  tiotropium 18 MICROgram(s) Capsule 1 Capsule(s) daily  valproic acid 250 milliGRAM(s) every 12 hours  zinc oxide 20% Ointment 1 Application(s) daily      --------------------------------------------------------------------

## 2019-06-02 NOTE — PROGRESS NOTE ADULT - ASSESSMENT
58 yo RHWF female with complex medical history  s/p suspected multiple  CVAs vs. encephalitis being readmitted to acute rehabilitation unit  with spastic quadriparesis, more pronounced in left UE, sensory impairment, gait impairment  and functional deficits.    #r/o CVA  ASA continues c GI ppx.     Tremors  --differ to neurology for management  does not appear to be worsening    #Diarrhea  -, Bentyl per GI plan.         #tachycardia  resolved  -Metoprolol BID. Tachycardia well controlled. HR<90,     #ILD  -Pulmicort, Spiriva, pulmonary evaluation completed.  -on home O2 PRN.     #Seizure  Continue Depakote, seizure ppx.         # Anxiety  -Xanax as needed  -Remeron had been added for insomnia and to improve appetite    #debility  Lovenox SQ cleared by GI, Nutrition eval.

## 2019-06-02 NOTE — PROVIDER CONTACT NOTE (OTHER) - ASSESSMENT
Pt A/Ox4 with no neuromusc changes. Manual BP 96/60 HR 98
Upon assessment, no involuntary movement noted on bilateral upper extremity. Follows commands able to move right arm, no motor movement noted on left UE, able to move B/L LE. No seizure, no HA . /75, HR- 98, RR -14 O2 Sat RA 97
Upon cleaning,noticed area on right inner buttock very red with open area   Moisyre relatred   NP notified
vital signs stable ho=954/73, QB=984, o2=96%, Temp=97.8. pt. feeling anxious and complaining of dull chest pain on left side of chest.
Alert and verbally responsive, breathing unlabored, /67, , RR 14, O2 Sat RA- 97%.  Patient had difficulty sleeping at night, noted anxious and restless. With PRN of Xanax and Melatonin but refused.

## 2019-06-03 LAB
ALBUMIN SERPL ELPH-MCNC: 2.6 G/DL — LOW (ref 3.3–5)
ALP SERPL-CCNC: 72 U/L — SIGNIFICANT CHANGE UP (ref 40–120)
ALT FLD-CCNC: 11 U/L DA — SIGNIFICANT CHANGE UP (ref 10–45)
ANION GAP SERPL CALC-SCNC: 8 MMOL/L — SIGNIFICANT CHANGE UP (ref 5–17)
AST SERPL-CCNC: 12 U/L — SIGNIFICANT CHANGE UP (ref 10–40)
BILIRUB SERPL-MCNC: 0.2 MG/DL — SIGNIFICANT CHANGE UP (ref 0.2–1.2)
BUN SERPL-MCNC: 22 MG/DL — SIGNIFICANT CHANGE UP (ref 7–23)
CALCIUM SERPL-MCNC: 9.1 MG/DL — SIGNIFICANT CHANGE UP (ref 8.4–10.5)
CHLORIDE SERPL-SCNC: 104 MMOL/L — SIGNIFICANT CHANGE UP (ref 96–108)
CO2 SERPL-SCNC: 29 MMOL/L — SIGNIFICANT CHANGE UP (ref 22–31)
CREAT SERPL-MCNC: 0.65 MG/DL — SIGNIFICANT CHANGE UP (ref 0.5–1.3)
GLUCOSE SERPL-MCNC: 90 MG/DL — SIGNIFICANT CHANGE UP (ref 70–99)
HCT VFR BLD CALC: 33.8 % — LOW (ref 34.5–45)
HGB BLD-MCNC: 11 G/DL — LOW (ref 11.5–15.5)
MCHC RBC-ENTMCNC: 29.8 PG — SIGNIFICANT CHANGE UP (ref 27–34)
MCHC RBC-ENTMCNC: 32.5 GM/DL — SIGNIFICANT CHANGE UP (ref 32–36)
MCV RBC AUTO: 91.6 FL — SIGNIFICANT CHANGE UP (ref 80–100)
NRBC # BLD: 0 /100 WBCS — SIGNIFICANT CHANGE UP (ref 0–0)
PLATELET # BLD AUTO: 475 K/UL — HIGH (ref 150–400)
POTASSIUM SERPL-MCNC: 3.7 MMOL/L — SIGNIFICANT CHANGE UP (ref 3.5–5.3)
POTASSIUM SERPL-SCNC: 3.7 MMOL/L — SIGNIFICANT CHANGE UP (ref 3.5–5.3)
PROT SERPL-MCNC: 5.9 G/DL — LOW (ref 6–8.3)
RBC # BLD: 3.69 M/UL — LOW (ref 3.8–5.2)
RBC # FLD: 15.7 % — HIGH (ref 10.3–14.5)
SODIUM SERPL-SCNC: 141 MMOL/L — SIGNIFICANT CHANGE UP (ref 135–145)
WBC # BLD: 12.57 K/UL — HIGH (ref 3.8–10.5)
WBC # FLD AUTO: 12.57 K/UL — HIGH (ref 3.8–10.5)

## 2019-06-03 PROCEDURE — 99233 SBSQ HOSP IP/OBS HIGH 50: CPT

## 2019-06-03 RX ORDER — SODIUM CHLORIDE 9 MG/ML
1000 INJECTION INTRAMUSCULAR; INTRAVENOUS; SUBCUTANEOUS ONCE
Refills: 0 | Status: COMPLETED | OUTPATIENT
Start: 2019-06-03 | End: 2019-06-03

## 2019-06-03 RX ORDER — LOPERAMIDE HCL 2 MG
2 TABLET ORAL ONCE
Refills: 0 | Status: COMPLETED | OUTPATIENT
Start: 2019-06-03 | End: 2019-06-03

## 2019-06-03 RX ADMIN — Medication 650 MILLIGRAM(S): at 08:27

## 2019-06-03 RX ADMIN — PANTOPRAZOLE SODIUM 40 MILLIGRAM(S): 20 TABLET, DELAYED RELEASE ORAL at 06:04

## 2019-06-03 RX ADMIN — TIOTROPIUM BROMIDE 1 CAPSULE(S): 18 CAPSULE ORAL; RESPIRATORY (INHALATION) at 08:53

## 2019-06-03 RX ADMIN — Medication 2 MILLIGRAM(S): at 21:29

## 2019-06-03 RX ADMIN — PREGABALIN 1000 MICROGRAM(S): 225 CAPSULE ORAL at 11:44

## 2019-06-03 RX ADMIN — Medication 650 MILLIGRAM(S): at 09:27

## 2019-06-03 RX ADMIN — Medication 650 MILLIGRAM(S): at 21:23

## 2019-06-03 RX ADMIN — ZINC OXIDE 1 APPLICATION(S): 200 OINTMENT TOPICAL at 11:45

## 2019-06-03 RX ADMIN — Medication 250 MILLIGRAM(S): at 06:04

## 2019-06-03 RX ADMIN — Medication 250 MILLIGRAM(S): at 17:25

## 2019-06-03 RX ADMIN — ENOXAPARIN SODIUM 40 MILLIGRAM(S): 100 INJECTION SUBCUTANEOUS at 11:44

## 2019-06-03 RX ADMIN — SODIUM CHLORIDE 1000 MILLILITER(S): 9 INJECTION INTRAMUSCULAR; INTRAVENOUS; SUBCUTANEOUS at 10:32

## 2019-06-03 RX ADMIN — Medication 650 MILLIGRAM(S): at 22:05

## 2019-06-03 NOTE — PROGRESS NOTE ADULT - ASSESSMENT
59 year old female s/p suspected CVA vs. encephalitis admitted to rehab with left-sided weakness and functional deficits.     #r/o CVA  continue ASA  GI ppx    #Tachycardia - HR - wnl  continue metoprolol BID at a reduse dose 12.5 mg po q12hrs 2/2 hypotension  Give NS IV 1L today for tachycardia 2/2 suspect dehydration due to loose stools)   monitor closely    #ILD  pulmicort, xopenex, spiriva  on home O2 PRN.     #Seizure  Continue Depakote, seizure ppx, f/u neurology      #IBS with alternating periods of constipation and diarrhea  pt. with loose stools this am  GI recommended bentyl but pt. refuses     #Back pain  Tylenol and lidocaine patch.     #Vitamin B12 deficiency  patient reports she is vegetarian  continue cyanocobalamin 1000 MICROGram(s) Oral daily    #Anxiety  -Xanax if needed  -Remeron   -Psychiatry following     #GERD  continue pantoprazole     #DVT ppx  Lovenox

## 2019-06-03 NOTE — PROGRESS NOTE ADULT - ASSESSMENT
58 yo RHWF female with complex medical history  s/p suspected multiple  CVAs vs. encephalitis being readmitted to acute rehabilitation unit  with spastic quadriparesis, more pronounced in left UE, sensory impairment, gait impairment  and functional deficits.    #r/o CVA  ASA continues c GI ppx.     #Diarrhea  -ongoing, GI in, Bentyl ordered by GI-patient refuses.     #Leukocytosis  - afebrile, Medicine following.  -will continue to monitor, cbc am.     #tachycardia  -Metoprolol BID. SBP 91 in therapy c -130s. NS fluid x1L, r/o dehydration c reported diarrhea. Monitor VS closely. Denies chest pain or new weakness.     #ILD  -Pulmicort, Spiriva, pulmonary evaluation completed.  -on home O2 PRN.     #Seizure  On Depakote for seizure ppx. Fine Tremor RUE.     # Anxiety  -Xanax as needed  -Remeron had been added for insomnia and to improve appetite, refuses.     #debility  Lovenox SQ cleared by GI, Nutrition eval.

## 2019-06-03 NOTE — PROGRESS NOTE ADULT - SUBJECTIVE AND OBJECTIVE BOX
CHIEF COMPLAINT: Tremor in right upper extremity. Diarrhea.       HISTORY OF PRESENT ILLNESS  60 yo female with PMHx of MVP, chronic SDH, interstitial lung disease/pneumothorax, pulmonary nodules, meniere's, non hodgkins lymphoma (SCD/XRT/chemo at [facility] 2003), TIA, tachycardia, occipital neuralgia, and neuropathy.  Patient was initially admitted to [facility] on 3/13/19 with acute onset of b/l UE weakness associated with nausea, blurry vision, and HA. CT head showed chronic B/L frontal SDH. The rest of the workup was negative. CVA/TIA r/out.  Pt was discharged home but on 3/17 had apparent seizure.  She was admitted to [facility] and intubated and placed on depakote.  CTA H/N, MRI, EEG unremarkable.  ID Recommended LP for fever; family deferred.  Transferred to [facility] on 3/19/19.  MRI brain showed cerebral ischemia. The rest of the workup was negative, this including CSF studies. EEG neg seizures. (Evaluated by lymphoma specialist/onco, PET 4/5/19 r/o recurrence of lymphoma. Increased signal at base of tongue to follow up outpt ENT). Additionally, course complicated with pneumothorax, pulmonary consulted, no interventions, self resolved.  Tachycardia - per pulm, related related to chronic lung disease.  GI consulted for rectal pain; diagnosed c anal fissure/constipation, bowel regimen started. KUB 4/12/19 No stool, no free air, no distended loops of small bowel, fibroids. Refused further workup.   Other issues while admitted: Hypotension - home diltiazem held per cardio  dizziness - sporadic, self resolving.  Pt deferred MRI.  May be 2/2 to hx of Meniere's disease.      *TTE 3/18/19 EF 60%, mod-severe MR.   MRI brain 3/29/19 cortically based restricted diffusion within R>L frontoparietal region as well as additional scattered small foci, may represent evolution of a recent ischemia vs improving encephalitis. Patient medically optimized and cleared for discharge to acute rehab at [facility] on 4/19/19.   At [facility] patient presented with rectal pain, fluctuating tachycardia 110-150 at rest and orthostatic hypotension. GI, hematology, cardiology and pulmonary evaluation requested. Rectal bleeding developed in the morning of 4/21/19 with severe hypotension and tachycardia. RRT called. Patient transferred to ICU. Patient's course in ICU reviewed and discussed with staff. Investigations, current lab results and recent treatments also reviewed and discussed. Patient was attempted to be evaluated by PT twice but unable to tolerate evaluation due to severe tachycardia up to 150 at rest and symptomatic orthostatic hypotension as low as 80s. Patient is preparing for colonoscopy today to evaluate the source of rectal bleed. S/p multiple PRBC transfusions. Presently on IV antibiotics and IV steroids. (09 May 2019 12:11)      PAST MEDICAL & SURGICAL HISTORY:  Interstitial lung disease: on home o2 prn  NHL (non-Hodgkin's lymphoma): Agem 43 sp chemo/rt/stem cell  Transient cerebral ischemia, unspecified type  Mitral prolapse  History of tonsillectomy  History of appendectomy  IBS       REVIEW OF SYMPTOMS                  [X] Endo WNL                     [X] Skin WNL                  [X] Cognitive WNL           VITALS  Vital Signs Last 24 Hrs  T(C): 36.4 (03 Jun 2019 08:01), Max: 36.4 (03 Jun 2019 08:01)  T(F): 97.5 (03 Jun 2019 08:01), Max: 97.5 (03 Jun 2019 08:01)  HR: 120 (03 Jun 2019 08:55) (90 - 120)  BP: 118/77 (03 Jun 2019 08:01) (107/69 - 118/77)  BP(mean): --  RR: 14 (03 Jun 2019 08:01) (14 - 14)  SpO2: 96% (03 Jun 2019 08:55) (96% - 98%)      PHYSICAL EXAM  Constitutional - Frail  HEENT - NCAT, EOMI  Neck - Supple, No limited ROM  Chest - Dimnished, No wheeze, No rhonchi, No crackles  Cardiovascular - Tachycardia, No murmurs  Abdomen - BS+, Soft, NTND  Extremities - No C/C/E, No calf tenderness   Skin-no rash  Wounds-na    Neurologic Exam - RUE tremor                    Psychiatric - Mood improved, Affect WNL       FUNCTIONAL PROGRESS  Gait - mod A  ADLs - mod A  Transfers - mod A  Functional transfer - mod A      RECENT LABS                        11.0   12.57 )-----------( 475      ( 03 Jun 2019 05:03 )             33.8     06-03    141  |  104  |  22  ----------------------------<  90  3.7   |  29  |  0.65    Ca    9.1      03 Jun 2019 05:03    TPro  5.9<L>  /  Alb  2.6<L>  /  TBili  0.2  /  DBili  x   /  AST  12  /  ALT  11  /  AlkPhos  72  06-03      LIVER FUNCTIONS - ( 03 Jun 2019 05:03 )  Alb: 2.6 g/dL / Pro: 5.9 g/dL / ALK PHOS: 72 U/L / ALT: 11 U/L DA / AST: 12 U/L / GGT: x                 Valproic Acid Level, Serum: 38 ug/mL (05-10-19 @ 14:31)  Valproic Acid Level, Serum: 60 ug/mL (04-22-19 @ 13:40)            RADIOLOGY/OTHER RESULTS      CURRENT MEDICATIONS  MEDICATIONS  (STANDING):  aspirin enteric coated 81 milliGRAM(s) Oral daily  buDESOnide  80 MICROgram(s)/formoterol 4.5 MICROgram(s) Inhaler 2 Puff(s) Inhalation two times a day  cyanocobalamin 1000 MICROGram(s) Oral daily  dicyclomine 20 milliGRAM(s) Oral four times a day before meals  enoxaparin Injectable 40 milliGRAM(s) SubCutaneous daily  lidocaine   Patch 1 Patch Transdermal <User Schedule>  metoprolol tartrate 12.5 milliGRAM(s) Oral every 12 hours  mirtazapine 7.5 milliGRAM(s) Oral at bedtime  pantoprazole    Tablet 40 milliGRAM(s) Oral before breakfast  sodium chloride 0.9% Bolus 1000 milliLiter(s) IV Bolus once  tiotropium 18 MICROgram(s) Capsule 1 Capsule(s) Inhalation daily  valproic acid 250 milliGRAM(s) Oral every 12 hours  zinc oxide 20% Ointment 1 Application(s) Topical daily    MEDICATIONS  (PRN):  acetaminophen   Tablet .. 650 milliGRAM(s) Oral every 6 hours PRN Moderate Pain (4 - 6)  aluminum hydroxide/magnesium hydroxide/simethicone Suspension 30 milliLiter(s) Oral every 6 hours PRN Dyspepsia  ondansetron    Tablet 4 milliGRAM(s) Oral every 6 hours PRN Nausea and/or Vomiting  ondansetron    Tablet 4 milliGRAM(s) Oral every 6 hours PRN Nausea      ASSESSMENT & PLAN    GI/Bowel Management - Off Amitiza, Bentyl for diarrhea-refuses   Management - Toilet Q2  Skin - Turn Q2  Pain - Tylenol PRN  DVT PPX - Lovenox     Continue comprehensive acute rehab program consisting of 3hrs/day of OT/PT and SLP.

## 2019-06-03 NOTE — PROGRESS NOTE ADULT - SUBJECTIVE AND OBJECTIVE BOX
Patient is a 59y old  Female who presents with a chief complaint of debility/functional deficits (2019 09:50)      Patient seen and examined at bedside, no events overnight, c/o diarrhea.     ALLERGIES:  IV Contrast (Anaphylaxis)  shellfish. (Anaphylaxis)    MEDICATIONS:  aluminum hydroxide/magnesium hydroxide/simethicone Suspension 30 milliLiter(s) Oral every 6 hours PRN  buDESOnide  80 MICROgram(s)/formoterol 4.5 MICROgram(s) Inhaler 2 Puff(s) Inhalation two times a day  dicyclomine 20 milliGRAM(s) Oral four times a day before meals  enoxaparin Injectable 40 milliGRAM(s) SubCutaneous daily  metoprolol tartrate 12.5 milliGRAM(s) Oral every 12 hours  mirtazapine 7.5 milliGRAM(s) Oral at bedtime  ondansetron    Tablet 4 milliGRAM(s) Oral every 6 hours PRN  ondansetron    Tablet 4 milliGRAM(s) Oral every 6 hours PRN    Vital Signs Last 24 Hrs  T(C): 36.4 (2019 08:01), Max: 36.4 (2019 08:01)  T(F): 97.5 (2019 08:01), Max: 97.5 (2019 08:01)  HR: 120 (2019 08:55) (90 - 120)  BP: 118/77 (2019 08:01) (107/69 - 118/77)  BP(mean): --  RR: 14 (2019 08:01) (14 - 14)  SpO2: 96% (2019 08:55) (96% - 98%)  I&O's Summary    2019 07:01  -  2019 07:00  --------------------------------------------------------  IN: 600 mL / OUT: 0 mL / NET: 600 mL          PAST MEDICAL & SURGICAL HISTORY:  Interstitial lung disease: on home o2 prn  NHL (non-Hodgkin's lymphoma): Agem 45 sp chemo/rt/stem cell  Transient cerebral ischemia, unspecified type  Mitral prolapse  History of tonsillectomy  History of appendectomy      Home Medications:  acetaminophen 325 mg oral tablet: 2 tab(s) orally every 6 hours, As needed, Mild Pain (1 - 3) (2019 05:07)  ALPRAZolam 0.5 mg oral tablet: 0.5 tab(s) orally 3 times a day (2019 05:07)  aspirin 81 mg oral delayed release tablet: 1 tab(s) orally once a day (07 May 2019 09:33)  budesonide 0.25 mg/2 mL inhalation suspension:  inhaled  (2019 05:07)  cholecalciferol oral tablet: 2000 unit(s) orally once a day (2019 05:07)  cyanocobalamin 1000 mcg oral tablet: 1 tab(s) orally once a day (2019 05:07)  docusate sodium 100 mg oral capsule: 1 cap(s) orally once a day, As needed, Constipation (07 May 2019 09:33)  ipratropium-albuterol 0.5 mg-2.5 mg/3 mLinhalation solution: 3 milliliter(s) inhaled every 6 hours, As needed, Shortness of Breath and/or Wheezing (07 May 2019 09:33)  melatonin 3 mg oral tablet: 1 tab(s) orally once a day (at bedtime), As needed, Sleep (2019 05:07)  metoprolol tartrate 25 mg oral tablet: 1 tab(s) orally 2 times a day (07 May 2019 09:33)  Multiple Vitamins oral tablet: 1 tab(s) orally once a day (07 May 2019 09:33)  ondansetron 4 mg oral tablet, disintegratin tab(s) orally every 6 hours, As needed, Nausea and/or Vomiting (2019 05:07)  pantoprazole 40 mg intravenous injection: 40 milligram(s) intravenous every 12 hours (2019 05:07)  senna oral tablet: 2 tab(s) orally once a day (at bedtime) (07 May 2019 09:33)  tiotropium 18 mcg inhalation capsule: 1 cap(s) inhaled once a day (2019 05:07)  valproic acid 250 mg oral capsule: 2 cap(s) orally every 12 hours (07 May 2019 09:33)  zinc oxide 20% topical ointment: 1 application topically once a day (2019 05:07)      PHYSICAL EXAM:  General: NAD, A/O x3  ENT: MMM  Neck: Supple, No JVD  Lungs: Clear to percussion bilaterally  Cardio: RRR, S1/S2, No murmurs  Abdomen: Soft, Nontender, Nondistended; Bowel sounds present  Extremities: No clubbing, cyanosis, or edema  Neuro: no new deficits     LABS:                        11.0   12.57 )-----------( 475      ( 2019 05:03 )             33.8     06-03    141  |  104  |  22  ----------------------------<  90  3.7   |  29  |  0.65    Ca    9.1      2019 05:03    TPro  5.9  /  Alb  2.6  /  TBili  0.2  /  DBili  x   /  AST  12  /  ALT  11  /  AlkPhos  72  06-03      03-13 Chol 193 mg/dL  mg/dL HDL 62 mg/dL Trig 112 mg/dL      RADIOLOGY & ADDITIONAL TESTS: no new tests     Care Discussed with Consultants/Other Providers: PM&R team

## 2019-06-03 NOTE — PROGRESS NOTE ADULT - ATTENDING COMMENTS
IBS symptoms persist. refusing Bentyl, GI is following  Hypotensive, did not receive Metoprolol due to parameters in place.  PO oral fluids and IVF given  Reports new action tremor, suspect that Depakote is responsible, will discuss with Psychiatry   Multidisciplinary team meeting today:  patient's functional goals and needs, functional and clinical  progress were discussed, barriers to discharge were identified. Anticipate discharge home with home care, 24/7 supervision for safety vs [facility] facility placement.     EDOD 6/6/19

## 2019-06-04 DIAGNOSIS — J84.9 INTERSTITIAL PULMONARY DISEASE, UNSPECIFIED: ICD-10-CM

## 2019-06-04 DIAGNOSIS — R19.7 DIARRHEA, UNSPECIFIED: ICD-10-CM

## 2019-06-04 DIAGNOSIS — G82.50 QUADRIPLEGIA, UNSPECIFIED: ICD-10-CM

## 2019-06-04 DIAGNOSIS — D72.829 ELEVATED WHITE BLOOD CELL COUNT, UNSPECIFIED: ICD-10-CM

## 2019-06-04 DIAGNOSIS — F41.9 ANXIETY DISORDER, UNSPECIFIED: ICD-10-CM

## 2019-06-04 DIAGNOSIS — R56.9 UNSPECIFIED CONVULSIONS: ICD-10-CM

## 2019-06-04 LAB
HCT VFR BLD CALC: 35.1 % — SIGNIFICANT CHANGE UP (ref 34.5–45)
HGB BLD-MCNC: 11 G/DL — LOW (ref 11.5–15.5)
MCHC RBC-ENTMCNC: 29.2 PG — SIGNIFICANT CHANGE UP (ref 27–34)
MCHC RBC-ENTMCNC: 31.3 GM/DL — LOW (ref 32–36)
MCV RBC AUTO: 93.1 FL — SIGNIFICANT CHANGE UP (ref 80–100)
NRBC # BLD: 0 /100 WBCS — SIGNIFICANT CHANGE UP (ref 0–0)
PLATELET # BLD AUTO: 469 K/UL — HIGH (ref 150–400)
RBC # BLD: 3.77 M/UL — LOW (ref 3.8–5.2)
RBC # FLD: 15.7 % — HIGH (ref 10.3–14.5)
WBC # BLD: 9.79 K/UL — SIGNIFICANT CHANGE UP (ref 3.8–10.5)
WBC # FLD AUTO: 9.79 K/UL — SIGNIFICANT CHANGE UP (ref 3.8–10.5)

## 2019-06-04 PROCEDURE — 99233 SBSQ HOSP IP/OBS HIGH 50: CPT

## 2019-06-04 PROCEDURE — 99232 SBSQ HOSP IP/OBS MODERATE 35: CPT

## 2019-06-04 RX ORDER — VALPROIC ACID (AS SODIUM SALT) 250 MG/5ML
250 SOLUTION, ORAL ORAL
Refills: 0 | Status: DISCONTINUED | OUTPATIENT
Start: 2019-06-04 | End: 2019-06-06

## 2019-06-04 RX ORDER — SODIUM CHLORIDE 9 MG/ML
500 INJECTION INTRAMUSCULAR; INTRAVENOUS; SUBCUTANEOUS ONCE
Refills: 0 | Status: COMPLETED | OUTPATIENT
Start: 2019-06-04 | End: 2019-06-04

## 2019-06-04 RX ORDER — LEVETIRACETAM 250 MG/1
250 TABLET, FILM COATED ORAL
Refills: 0 | Status: DISCONTINUED | OUTPATIENT
Start: 2019-06-04 | End: 2019-06-04

## 2019-06-04 RX ADMIN — Medication 650 MILLIGRAM(S): at 07:02

## 2019-06-04 RX ADMIN — Medication 12.5 MILLIGRAM(S): at 18:11

## 2019-06-04 RX ADMIN — ENOXAPARIN SODIUM 40 MILLIGRAM(S): 100 INJECTION SUBCUTANEOUS at 11:16

## 2019-06-04 RX ADMIN — Medication 250 MILLIGRAM(S): at 06:07

## 2019-06-04 RX ADMIN — PANTOPRAZOLE SODIUM 40 MILLIGRAM(S): 20 TABLET, DELAYED RELEASE ORAL at 06:07

## 2019-06-04 RX ADMIN — PREGABALIN 1000 MICROGRAM(S): 225 CAPSULE ORAL at 11:29

## 2019-06-04 RX ADMIN — Medication 10 MILLIGRAM(S): at 11:29

## 2019-06-04 RX ADMIN — Medication 650 MILLIGRAM(S): at 20:17

## 2019-06-04 RX ADMIN — TIOTROPIUM BROMIDE 1 CAPSULE(S): 18 CAPSULE ORAL; RESPIRATORY (INHALATION) at 08:59

## 2019-06-04 RX ADMIN — ZINC OXIDE 1 APPLICATION(S): 200 OINTMENT TOPICAL at 11:27

## 2019-06-04 RX ADMIN — Medication 250 MILLIGRAM(S): at 18:10

## 2019-06-04 RX ADMIN — Medication 650 MILLIGRAM(S): at 08:02

## 2019-06-04 RX ADMIN — Medication 650 MILLIGRAM(S): at 21:47

## 2019-06-04 RX ADMIN — SODIUM CHLORIDE 1000 MILLILITER(S): 9 INJECTION INTRAMUSCULAR; INTRAVENOUS; SUBCUTANEOUS at 15:44

## 2019-06-04 NOTE — PROGRESS NOTE ADULT - SUBJECTIVE AND OBJECTIVE BOX
INTERVAL HPI/OVERNIGHT EVENTS:  HPI:  58 y/o female PMHx of MVP, chronic SDH, interstitial lung disease/pneumothorax, pulmonary nodules, meniere's, non hodgkins lymphoma (SCD/XRT/chemo at [facility] ), TIA, tachycardia, occipital neuralgia, and neuropathy. Patient seen and examined on rehab this morning. She still complains of abdominal cramping and loose stool. >3 epsiodes of loose stool last night. She refused bentyl "I don't want to take another medication with side effects", but she is requesting imodium     MEDICATIONS  (STANDING):  aspirin enteric coated 81 milliGRAM(s) Oral daily  buDESOnide  80 MICROgram(s)/formoterol 4.5 MICROgram(s) Inhaler 2 Puff(s) Inhalation two times a day  cyanocobalamin 1000 MICROGram(s) Oral daily  dicyclomine 20 milliGRAM(s) Oral four times a day before meals  enoxaparin Injectable 40 milliGRAM(s) SubCutaneous daily  lidocaine   Patch 1 Patch Transdermal <User Schedule>  metoprolol tartrate 12.5 milliGRAM(s) Oral every 12 hours  mirtazapine 7.5 milliGRAM(s) Oral at bedtime  pantoprazole    Tablet 40 milliGRAM(s) Oral before breakfast  tiotropium 18 MICROgram(s) Capsule 1 Capsule(s) Inhalation daily  valproic acid 250 milliGRAM(s) Oral every 12 hours  zinc oxide 20% Ointment 1 Application(s) Topical daily    MEDICATIONS  (PRN):  acetaminophen   Tablet .. 650 milliGRAM(s) Oral every 6 hours PRN Moderate Pain (4 - 6)  aluminum hydroxide/magnesium hydroxide/simethicone Suspension 30 milliLiter(s) Oral every 6 hours PRN Dyspepsia  ondansetron    Tablet 4 milliGRAM(s) Oral every 6 hours PRN Nausea and/or Vomiting  ondansetron    Tablet 4 milliGRAM(s) Oral every 6 hours PRN Nausea      Allergies    IV Contrast (Anaphylaxis)  shellfish. (Anaphylaxis)    Intolerances        PHYSICAL EXAM:   Vital Signs:  Vital Signs Last 24 Hrs  T(C): 36.6 (2019 07:36), Max: 36.7 (2019 22:30)  T(F): 97.8 (2019 07:36), Max: 98.1 (2019 22:30)  HR: 88 (2019 07:36) (87 - 111)  BP: 110/75 (2019 07:36) (94/63 - 110/75)  BP(mean): --  RR: 14 (2019 07:36) (14 - 14)  SpO2: 98% (2019 07:36) (97% - 98%)  Daily     Daily Weight in k.6 (2019 20:45)I&O's Summary      GENERAL:  Appears stated age,  HEENT:  NC/AT,  conjunctivae clear and pink  CHEST:  Full & symmetric excursion, no increased effort, breath sounds clear  HEART:  Regular rhythm, S1, S2  ABDOMEN:  Soft, non-tender, non-distended, normoactive bowel sounds  EXTEREMITIES:  no edema, L side weakness, +pulses   SKIN:  No rash, warm/dry  NEURO:  Alert, oriented,      LABS:                        11.0   9.79  )-----------( 469      ( 2019 05:20 )             35.1     06-03    141  |  104  |  22  ----------------------------<  90  3.7   |  29  |  0.65    Ca    9.1      2019 05:03    TPro  5.9<L>  /  Alb  2.6<L>  /  TBili  0.2  /  DBili  x   /  AST  12  /  ALT  11  /  AlkPhos  72  06-03        amylase   lipase  RADIOLOGY & ADDITIONAL TESTS:

## 2019-06-04 NOTE — CHART NOTE - NSCHARTNOTEFT_GEN_A_CORE
Called to gym to evaluate Mrs Kwong for Tachycardia. HR at rest 135. SBP 90. Denies chest pain. No new weakness. Patient refused Metoprolol doses at multiple times. States 'I don't need it'. Indication explained. NS bolus 500cc. Cardiology evaluation, discussed c Dr Veras. Will follow recommendations.

## 2019-06-04 NOTE — PROGRESS NOTE ADULT - SUBJECTIVE AND OBJECTIVE BOX
Patient is a 59y old  Female who presents with a chief complaint of debility/functional deficits (04 Jun 2019 12:17)  No acute complaints        Patient seen and examined at bedside.    ALLERGIES:  IV Contrast (Anaphylaxis)  shellfish. (Anaphylaxis)        Vital Signs Last 24 Hrs  T(F): 97.8 (04 Jun 2019 07:36), Max: 98.1 (03 Jun 2019 22:30)  HR: 88 (04 Jun 2019 07:36) (87 - 111)  BP: 110/75 (04 Jun 2019 07:36) (94/63 - 110/75)  RR: 14 (04 Jun 2019 07:36) (14 - 14)  SpO2: 98% (04 Jun 2019 07:36) (97% - 98%)  I&O's Summary    MEDICATIONS:  acetaminophen   Tablet .. 650 milliGRAM(s) Oral every 6 hours PRN  aluminum hydroxide/magnesium hydroxide/simethicone Suspension 30 milliLiter(s) Oral every 6 hours PRN  aspirin enteric coated 81 milliGRAM(s) Oral daily  buDESOnide  80 MICROgram(s)/formoterol 4.5 MICROgram(s) Inhaler 2 Puff(s) Inhalation two times a day  cyanocobalamin 1000 MICROGram(s) Oral daily  dicyclomine 20 milliGRAM(s) Oral four times a day before meals  enoxaparin Injectable 40 milliGRAM(s) SubCutaneous daily  levETIRAcetam 250 milliGRAM(s) Oral two times a day  lidocaine   Patch 1 Patch Transdermal <User Schedule>  metoprolol tartrate 12.5 milliGRAM(s) Oral every 12 hours  mirtazapine 7.5 milliGRAM(s) Oral at bedtime  ondansetron    Tablet 4 milliGRAM(s) Oral every 6 hours PRN  ondansetron    Tablet 4 milliGRAM(s) Oral every 6 hours PRN  pantoprazole    Tablet 40 milliGRAM(s) Oral before breakfast  tiotropium 18 MICROgram(s) Capsule 1 Capsule(s) Inhalation daily  zinc oxide 20% Ointment 1 Application(s) Topical daily      PHYSICAL EXAM:  General: NAD  ENT: MMM  Neck: Supple, No JVD  Lungs: Mostly clear  Cardio: S1S2 regular  Abdomen: Soft, Nontender  Extremities: No cyanosis, No edema    LABS:                        11.0   9.79  )-----------( 469      ( 04 Jun 2019 05:20 )             35.1     06-03    141  |  104  |  22  ----------------------------<  90  3.7   |  29  |  0.65    Ca    9.1      03 Jun 2019 05:03    TPro  5.9  /  Alb  2.6  /  TBili  0.2  /  DBili  x   /  AST  12  /  ALT  11  /  AlkPhos  72  06-03    eGFR if Non African American: 96 mL/min/1.73M2 (06-03-19 @ 05:03)  eGFR if African American: 111 mL/min/1.73M2 (06-03-19 @ 05:03)            03-13 Chol 193 mg/dL  mg/dL HDL 62 mg/dL Trig 112 mg/dL        CAPILLARY BLOOD GLUCOSE                RADIOLOGY & ADDITIONAL TESTS:    Care Discussed with Consultants/Other Providers:

## 2019-06-04 NOTE — PROGRESS NOTE ADULT - ASSESSMENT
58 y/o female with multiple medical problems. Patient refusing bentyl. Lengthy discussion had at bedside by neurology in regards to Bently vs Imodium and their side effects. Patient agreeing to take bentyl now and understands she might not feel relief after one dose. Non-tender abdomen. Complaining of abdominal cramps and loose stool. No fever/chills.

## 2019-06-04 NOTE — PROGRESS NOTE ADULT - SUBJECTIVE AND OBJECTIVE BOX
Follow up for tachycardia  SUBJ:    comfortable nurse involved in iv catheter insertion.       PMH  Interstitial lung disease  NHL (non-Hodgkin's lymphoma)  Transient cerebral ischemia, unspecified type  Mitral prolapse  Pulmonary disease      MEDICATIONS  (STANDING):  aspirin enteric coated 81 milliGRAM(s) Oral daily  buDESOnide  80 MICROgram(s)/formoterol 4.5 MICROgram(s) Inhaler 2 Puff(s) Inhalation two times a day  cyanocobalamin 1000 MICROGram(s) Oral daily  dicyclomine 20 milliGRAM(s) Oral four times a day before meals  enoxaparin Injectable 40 milliGRAM(s) SubCutaneous daily  levETIRAcetam 250 milliGRAM(s) Oral two times a day  lidocaine   Patch 1 Patch Transdermal <User Schedule>  metoprolol tartrate 12.5 milliGRAM(s) Oral every 12 hours  mirtazapine 7.5 milliGRAM(s) Oral at bedtime  pantoprazole    Tablet 40 milliGRAM(s) Oral before breakfast  tiotropium 18 MICROgram(s) Capsule 1 Capsule(s) Inhalation daily  zinc oxide 20% Ointment 1 Application(s) Topical daily    MEDICATIONS  (PRN):  acetaminophen   Tablet .. 650 milliGRAM(s) Oral every 6 hours PRN Moderate Pain (4 - 6)  aluminum hydroxide/magnesium hydroxide/simethicone Suspension 30 milliLiter(s) Oral every 6 hours PRN Dyspepsia  ondansetron    Tablet 4 milliGRAM(s) Oral every 6 hours PRN Nausea and/or Vomiting  ondansetron    Tablet 4 milliGRAM(s) Oral every 6 hours PRN Nausea        PHYSICAL EXAM:  Vital Signs Last 24 Hrs  T(C): 36.6 (04 Jun 2019 07:36), Max: 36.7 (03 Jun 2019 22:30)  T(F): 97.8 (04 Jun 2019 07:36), Max: 98.1 (03 Jun 2019 22:30)  HR: 118 (04 Jun 2019 15:04) (87 - 118)  BP: 101/66 (04 Jun 2019 15:04) (94/63 - 110/75)  BP(mean): --  RR: 14 (04 Jun 2019 07:36) (14 - 14)  SpO2: 98% (04 Jun 2019 07:36) (97% - 98%)    GENERAL: NAD, chronically ill appearing  HEAD:  Atraumatic, Normocephalic  EYES: EOMI, PERRLA, conjunctiva and sclera clear  ENT: Moist mucous membranes,  NECK: Supple, No JVD, no bruits  CHEST/LUNG: Clear to percussion bilaterally; No rales, rhonchi, wheezing, or rubs  HEART: Regular rate and rhythm; No murmurs, rubs, or gallops PMI non displaced.  ABDOMEN: Soft, Nontender, Nondistended; Bowel sounds present  EXTREMITIES:  2+ Peripheral Pulses, No clubbing, cyanosis, or edema  SKIN: No rashes or lesions  NERVOUS SYSTEM:  Cranial Nerves II-XII intact      TELEMETRY:    ECG:    < from: 12 Lead ECG (05.19.19 @ 11:27) >  Ventricular Rate 105 BPM    Atrial Rate 105 BPM    P-R Interval 136 ms    QRS Duration 90 ms    Q-T Interval 348 ms    QTC Calculation(Bezet) 459 ms    P Axis 83 degrees    R Axis 47 degrees    T Axis 11 degrees    Diagnosis Line Sinus tachycardia  Non specific ST abnormalities      When compared with ECG of 16-MAY-2019 00:28,  No significant change was found  Confirmed by FLAQUITO OCHOA MD ([id number]) on 5/20/2019 9:33:28 AM    < end of copied text >    ECHO:    < from: TTE Echo Complete w/Doppler (04.26.19 @ 11:26) >    Summary:   1. Left ventricular ejection fraction, by visual estimation, is 55%.   2. Normal global left ventricular systolic function.   3. Spectral Doppler shows impaired relaxation pattern of left   ventricular myocardial filling (Grade I diastolic dysfunction).   4. There is mild concentric left ventricular hypertrophy.   5. Myxomatous mitral valve.   6. Moderate-severe tricuspid regurgitation.   7. Estimatedpulmonary artery systolic pressure is 50.4 mmHg assuming a   right atrial pressure of 10 mmHg, which is consistent with moderate   pulmonary hypertension.   8. Pulmonary hypertension is present.   9. LA volume Index is 21.6 ml/m² ml/m2.    [id number] Jose Francisco Gee MD,[facility] , Electronically signed on 4/26/2019 at   12:43:35 PM       *** Final ***        JOSE FRANCISCO GEE M.D., ATTENDING CARDIOLOGIST  This document has been electronically signed. Apr 26 2019 11:26AM    < end of copied text >      LABS:                        11.0   9.79  )-----------( 469      ( 04 Jun 2019 05:20 )             35.1     06-03    141  |  104  |  22  ----------------------------<  90  3.7   |  29  |  0.65    Ca    9.1      03 Jun 2019 05:03    TPro  5.9<L>  /  Alb  2.6<L>  /  TBili  0.2  /  DBili  x   /  AST  12  /  ALT  11  /  AlkPhos  72  06-03      I&O's Summary    BNP    RADIOLOGY & ADDITIONAL STUDIES:    < from: Xray Chest 1 View- PORTABLE-Routine (04.29.19 @ 06:52) >  EXAM:  XR CHEST PORTABLE ROUTINE 1V      PROCEDURE DATE:  04/29/2019        INTERPRETATION:  Single view chest    History shortness of breath, interstitial lung disease    Comparison yesterday    The central line remains in place tip centered over the upper right   atrium. There is moderate patchy interstitial opacity, unchanged without   lobar consolidation or layering effusion. There is mild cardiomegaly.   There is no pneumothorax.      ANGELICA ALTMAN M.D., ATTENDING RADIOLOGIST  This document has been electronically signed. Apr 29 2019  8:26AM    < end of copied text >      ECHO:

## 2019-06-04 NOTE — PROGRESS NOTE ADULT - ATTENDING COMMENTS
New action tremor on Depakote now is interfering with ADLs. Patient has one SZ home on 3/17 and subsequently started on Depakote. Reportedly had negative EEG. Will switch Depakote to Keppra for now, will monitor closely and suggest follow up with primary neurology after discharge this week.  Still reports multiple episodes of watery diarrhea, Case discussed with GI and patient. Will restart Bentyl as suggested.    Discharge plan is in progress, discussed with SW New action tremor on Depakote now is interfering with ADLs. Patient has one SZ home on 3/17 and subsequently started on Depakote. Reportedly had negative EEG. Will switch Depakote to Keppra for now, will monitor closely and suggest follow up with primary neurology after discharge this week.  Still reports multiple episodes of watery diarrhea, Case discussed with GI and patient. Will restart Bentyl as suggested.    Discharge plan is in progress, discussed with SW    4:25 pm  Patient reports taking Keppra at [facility], Keppra was stopped dur to "severe side effects".  Will resume Depakote for now and coordinate care with primary neurologist.

## 2019-06-04 NOTE — PROGRESS NOTE ADULT - SUBJECTIVE AND OBJECTIVE BOX
CHIEF COMPLAINT: Diarrhea. Tremor.       HISTORY OF PRESENT ILLNESS  60 yo female with PMHx of MVP, chronic SDH, interstitial lung disease/pneumothorax, pulmonary nodules, meniere's, non hodgkins lymphoma (SCD/XRT/chemo at [facility] 2003), TIA, tachycardia, occipital neuralgia, and neuropathy.  Patient was initially admitted to [facility] on 3/13/19 with acute onset of b/l UE weakness associated with nausea, blurry vision, and HA. CT head showed chronic B/L frontal SDH. The rest of the workup was negative. CVA/TIA r/out.  Pt was discharged home but on 3/17 had apparent seizure.  She was admitted to [facility] and intubated and placed on depakote.  CTA H/N, MRI, EEG unremarkable.  ID Recommended LP for fever; family deferred.  Transferred to [facility] on 3/19/19.  MRI brain showed cerebral ischemia. The rest of the workup was negative, this including CSF studies. EEG neg seizures. (Evaluated by lymphoma specialist/onco, PET 4/5/19 r/o recurrence of lymphoma. Increased signal at base of tongue to follow up outpt ENT). Additionally, course complicated with pneumothorax, pulmonary consulted, no interventions, self resolved.  Tachycardia - per pulm, related related to chronic lung disease.  GI consulted for rectal pain; diagnosed c anal fissure/constipation, bowel regimen started. KUB 4/12/19 No stool, no free air, no distended loops of small bowel, fibroids. Refused further workup.   Other issues while admitted:  Hypotension - home diltiazem held per cardio  dizziness - sporadic, self resolving.  Pt deferred MRI.  May be 2/2 to hx of Meniere's disease.      *TTE 3/18/19 EF 60%, mod-severe MR.   MRI brain 3/29/19 cortically based restricted diffusion within R>L frontoparietal region as well as additional scattered small foci, may represent evolution of a recent ischemia vs improving encephalitis. Patient medically optimized and cleared for discharge to acute rehab at [facility] on 4/19/19.   At [facility] patient presented with rectal pain, fluctuating tachycardia 110-150 at rest and orthostatic hypotension. GI, hematology, cardiology and pulmonary evaluation requested. Rectal bleeding developed in the morning of 4/21/19 with severe hypotension and tachycardia. RRT called. Patient transferred to ICU. Patient's course in ICU reviewed and discussed with staff. Investigations, current lab results and recent treatments also reviewed and discussed. Patient was attempted to be evaluated by PT twice but unable to tolerate evaluation due to severe tachycardia up to 150 at rest and symptomatic orthostatic hypotension as low as 80s. Patient is preparing for colonoscopy today to evaluate the source of rectal bleed. S/p multiple PRBC transfusions. Presently on IV antibiotics and IV steroids. (09 May 2019 12:11)    PAST MEDICAL & SURGICAL HISTORY:  Interstitial lung disease: on home o2 prn  NHL (non-Hodgkin's lymphoma): Agem 45 sp chemo/rt/stem cell  Transient cerebral ischemia, unspecified type  Mitral prolapse  History of tonsillectomy  History of appendectomy       REVIEW OF SYMPTOMS  [X] Constitutional WNL                      [X] Heme WNL              [X] Endo WNL                     [X] Skin WNL                 [X] MSK WNL                   [X] Cognitive WNL            VITALS  Vital Signs Last 24 Hrs  T(C): 36.6 (04 Jun 2019 07:36), Max: 36.7 (03 Jun 2019 22:30)  T(F): 97.8 (04 Jun 2019 07:36), Max: 98.1 (03 Jun 2019 22:30)  HR: 88 (04 Jun 2019 07:36) (87 - 111)  BP: 110/75 (04 Jun 2019 07:36) (94/63 - 110/75)  BP(mean): --  RR: 14 (04 Jun 2019 07:36) (14 - 14)  SpO2: 98% (04 Jun 2019 07:36) (97% - 98%)    PHYSICAL EXAM  Constitutional - Frail  HEENT - NCAT, EOMI  Neck - Supple, No limited ROM  Chest - Dimnished, No wheeze, No rhonchi, No crackles  Cardiovascular - Tachycardia, No murmurs  Abdomen - BS+, Soft, NTND  Extremities - No C/C/E, No calf tenderness   Skin-no rash  Wounds-na    Neurologic Exam - RUE tremor                    Psychiatric - Mood improved, Affect WNL       FUNCTIONAL PROGRESS  Gait - mod A  ADLs - mod A  Transfers - mod/min A  Functional transfer - mod A    RECENT LABS                        11.0   9.79  )-----------( 469      ( 04 Jun 2019 05:20 )             35.1     06-03    141  |  104  |  22  ----------------------------<  90  3.7   |  29  |  0.65    Ca    9.1      03 Jun 2019 05:03    TPro  5.9<L>  /  Alb  2.6<L>  /  TBili  0.2  /  DBili  x   /  AST  12  /  ALT  11  /  AlkPhos  72  06-03      LIVER FUNCTIONS - ( 03 Jun 2019 05:03 )  Alb: 2.6 g/dL / Pro: 5.9 g/dL / ALK PHOS: 72 U/L / ALT: 11 U/L DA / AST: 12 U/L / GGT: x                 Valproic Acid Level, Serum: 38 ug/mL (05-10-19 @ 14:31)  Valproic Acid Level, Serum: 60 ug/mL (04-22-19 @ 13:40)            RADIOLOGY/OTHER RESULTS      CURRENT MEDICATIONS  MEDICATIONS  (STANDING):  aspirin enteric coated 81 milliGRAM(s) Oral daily  buDESOnide  80 MICROgram(s)/formoterol 4.5 MICROgram(s) Inhaler 2 Puff(s) Inhalation two times a day  cyanocobalamin 1000 MICROGram(s) Oral daily  dicyclomine 20 milliGRAM(s) Oral four times a day before meals  enoxaparin Injectable 40 milliGRAM(s) SubCutaneous daily  lidocaine   Patch 1 Patch Transdermal <User Schedule>  metoprolol tartrate 12.5 milliGRAM(s) Oral every 12 hours  mirtazapine 7.5 milliGRAM(s) Oral at bedtime  pantoprazole    Tablet 40 milliGRAM(s) Oral before breakfast  tiotropium 18 MICROgram(s) Capsule 1 Capsule(s) Inhalation daily  valproic acid 250 milliGRAM(s) Oral every 12 hours  zinc oxide 20% Ointment 1 Application(s) Topical daily    MEDICATIONS  (PRN):  acetaminophen   Tablet .. 650 milliGRAM(s) Oral every 6 hours PRN Moderate Pain (4 - 6)  aluminum hydroxide/magnesium hydroxide/simethicone Suspension 30 milliLiter(s) Oral every 6 hours PRN Dyspepsia  ondansetron    Tablet 4 milliGRAM(s) Oral every 6 hours PRN Nausea and/or Vomiting  ondansetron    Tablet 4 milliGRAM(s) Oral every 6 hours PRN Nausea      ASSESSMENT & PLAN    GI/Bowel Management - Off Amitiza, Bentyl for diarrhea-refused to date. GI re-eval.    Management - Toilet Q2  Skin - Turn Q2  Pain - Tylenol PRN  DVT PPX - Lovenox       Continue comprehensive acute rehab program consisting of 3hrs/day of OT/PT and SLP.

## 2019-06-04 NOTE — PROGRESS NOTE ADULT - ASSESSMENT
58 yo RHWF female with complex medical history  s/p suspected multiple  CVAs vs. encephalitis being readmitted to acute rehabilitation unit  with spastic quadriparesis, more pronounced in left UE, sensory impairment, gait impairment  and functional deficits.    #r/o CVA  ASA continues c GI ppx.     #Diarrhea  -ongoing, GI in, Bentyl ordered by GI-patient refused to date. Reports 6 episodes of watery diarrhea in last 24h. Bentyl indication and dehydration risk explained at length. GI in.      #Leukocytosis  - afebrile, Medicine following.  -will continue to monitor labs and symptoms.     #tachycardia  -Metoprolol BID. NS fluid x1L yesterday. Monitor VS closely. Denies chest pain or new weakness.     #ILD  -Pulmicort, Spiriva, pulmonary evaluation completed.  -on home O2 PRN.     #Seizure  On Depakote for seizure ppx. Fine Tremor RUE.     # Anxiety  -Xanax as needed  -Remeron had been added for insomnia and to improve appetite, refuses.     #debility  Lovenox SQ cleared by GI, Nutrition eval.

## 2019-06-04 NOTE — PROGRESS NOTE ADULT - ASSESSMENT
60 yo RHWF female with complex medical history  s/p suspected multiple  CVAs vs. encephalitis being readmitted to acute rehabilitation unit  with spastic quadriparesis, more pronounced in left UE, sensory impairment, gait impairment  and functional deficits.

## 2019-06-04 NOTE — PROGRESS NOTE ADULT - ATTENDING COMMENTS
Patient seen and examined.  Agree with assessment and plan as above.  Still complaining of lower abdominal cramps, but refuses dicyclomine.    Abdomen remains soft, nontender

## 2019-06-05 LAB
ALBUMIN SERPL ELPH-MCNC: 2.3 G/DL — LOW (ref 3.3–5)
ALP SERPL-CCNC: 65 U/L — SIGNIFICANT CHANGE UP (ref 40–120)
ALT FLD-CCNC: 11 U/L DA — SIGNIFICANT CHANGE UP (ref 10–45)
ANION GAP SERPL CALC-SCNC: 7 MMOL/L — SIGNIFICANT CHANGE UP (ref 5–17)
AST SERPL-CCNC: 13 U/L — SIGNIFICANT CHANGE UP (ref 10–40)
BILIRUB SERPL-MCNC: 0.1 MG/DL — LOW (ref 0.2–1.2)
BUN SERPL-MCNC: 13 MG/DL — SIGNIFICANT CHANGE UP (ref 7–23)
CALCIUM SERPL-MCNC: 8.7 MG/DL — SIGNIFICANT CHANGE UP (ref 8.4–10.5)
CHLORIDE SERPL-SCNC: 106 MMOL/L — SIGNIFICANT CHANGE UP (ref 96–108)
CO2 SERPL-SCNC: 30 MMOL/L — SIGNIFICANT CHANGE UP (ref 22–31)
CREAT SERPL-MCNC: 0.67 MG/DL — SIGNIFICANT CHANGE UP (ref 0.5–1.3)
GLUCOSE SERPL-MCNC: 87 MG/DL — SIGNIFICANT CHANGE UP (ref 70–99)
HCT VFR BLD CALC: 30.8 % — LOW (ref 34.5–45)
HGB BLD-MCNC: 9.8 G/DL — LOW (ref 11.5–15.5)
MCHC RBC-ENTMCNC: 29.6 PG — SIGNIFICANT CHANGE UP (ref 27–34)
MCHC RBC-ENTMCNC: 31.8 GM/DL — LOW (ref 32–36)
MCV RBC AUTO: 93.1 FL — SIGNIFICANT CHANGE UP (ref 80–100)
NRBC # BLD: 0 /100 WBCS — SIGNIFICANT CHANGE UP (ref 0–0)
PLATELET # BLD AUTO: 434 K/UL — HIGH (ref 150–400)
POTASSIUM SERPL-MCNC: 3.5 MMOL/L — SIGNIFICANT CHANGE UP (ref 3.5–5.3)
POTASSIUM SERPL-SCNC: 3.5 MMOL/L — SIGNIFICANT CHANGE UP (ref 3.5–5.3)
PROT SERPL-MCNC: 5.5 G/DL — LOW (ref 6–8.3)
RBC # BLD: 3.31 M/UL — LOW (ref 3.8–5.2)
RBC # FLD: 15.7 % — HIGH (ref 10.3–14.5)
SODIUM SERPL-SCNC: 143 MMOL/L — SIGNIFICANT CHANGE UP (ref 135–145)
WBC # BLD: 7.01 K/UL — SIGNIFICANT CHANGE UP (ref 3.8–10.5)
WBC # FLD AUTO: 7.01 K/UL — SIGNIFICANT CHANGE UP (ref 3.8–10.5)

## 2019-06-05 PROCEDURE — 99233 SBSQ HOSP IP/OBS HIGH 50: CPT

## 2019-06-05 PROCEDURE — 99232 SBSQ HOSP IP/OBS MODERATE 35: CPT

## 2019-06-05 RX ORDER — BENZOCAINE AND MENTHOL 5; 1 G/100ML; G/100ML
1 LIQUID ORAL THREE TIMES A DAY
Refills: 0 | Status: DISCONTINUED | OUTPATIENT
Start: 2019-06-05 | End: 2019-06-06

## 2019-06-05 RX ADMIN — Medication 650 MILLIGRAM(S): at 06:31

## 2019-06-05 RX ADMIN — TIOTROPIUM BROMIDE 1 CAPSULE(S): 18 CAPSULE ORAL; RESPIRATORY (INHALATION) at 08:43

## 2019-06-05 RX ADMIN — Medication 650 MILLIGRAM(S): at 16:23

## 2019-06-05 RX ADMIN — Medication 650 MILLIGRAM(S): at 15:26

## 2019-06-05 RX ADMIN — Medication 650 MILLIGRAM(S): at 07:30

## 2019-06-05 RX ADMIN — PANTOPRAZOLE SODIUM 40 MILLIGRAM(S): 20 TABLET, DELAYED RELEASE ORAL at 06:31

## 2019-06-05 RX ADMIN — Medication 250 MILLIGRAM(S): at 06:31

## 2019-06-05 RX ADMIN — Medication 250 MILLIGRAM(S): at 17:09

## 2019-06-05 RX ADMIN — PREGABALIN 1000 MICROGRAM(S): 225 CAPSULE ORAL at 11:40

## 2019-06-05 RX ADMIN — ENOXAPARIN SODIUM 40 MILLIGRAM(S): 100 INJECTION SUBCUTANEOUS at 11:40

## 2019-06-05 RX ADMIN — ZINC OXIDE 1 APPLICATION(S): 200 OINTMENT TOPICAL at 11:41

## 2019-06-05 NOTE — PROGRESS NOTE ADULT - ASSESSMENT
59 year old female s/p suspected CVA vs. encephalitis admitted to rehab with left-sided weakness and functional deficits.     #r/o CVA  continue ASA  GI ppx    #Tachycardia - HR - wnl  continue metoprolol BID 12.5 mg po q12hrs   monitor closely    #ILD  pulmicort, xopenex, spiriva  on home O2 PRN.     #Seizure  Continue Depakote, seizure ppx, f/u neurology      #IBS with alternating periods of constipation and diarrhea  GI recommended bentyl but pt. refuses     #Back pain  Tylenol and lidocaine patch.     #Vitamin B12 deficiency  patient reports she is vegetarian  continue cyanocobalamin 1000 MICROGram(s) Oral daily    #Anxiety  - xanax prn  -Remeron for insomnia  -Psychiatry following     #GERD  continue pantoprazole     #DVT ppx  Lovenox

## 2019-06-05 NOTE — PROGRESS NOTE ADULT - SUBJECTIVE AND OBJECTIVE BOX
CHIEF COMPLAINT: Diarrhea.       HISTORY OF PRESENT ILLNESS  58 yo female with PMHx of MVP, chronic SDH, interstitial lung disease/pneumothorax, pulmonary nodules, meniere's, non hodgkins lymphoma (SCD/XRT/chemo at [facility] 2003), TIA, tachycardia, occipital neuralgia, and neuropathy.  Patient was initially admitted to [facility] on 3/13/19 with acute onset of b/l UE weakness associated with nausea, blurry vision, and HA. CT head showed chronic B/L frontal SDH. The rest of the workup was negative. CVA/TIA r/out.  Pt was discharged home but on 3/17 had apparent seizure.  She was admitted to [facility] and intubated and placed on depakote.  CTA H/N, MRI, EEG unremarkable.  ID Recommended LP for fever; family deferred.  Transferred to [facility] on 3/19/19.  MRI brain showed cerebral ischemia. The rest of the workup was negative, this including CSF studies. EEG neg seizures. (Evaluated by lymphoma specialist/onco, PET 4/5/19 r/o recurrence of lymphoma. Increased signal at base of tongue to follow up outpt ENT). Additionally, course complicated with pneumothorax, pulmonary consulted, no interventions, self resolved.  Tachycardia - per pulm, related related to chronic lung disease.  GI consulted for rectal pain; diagnosed c anal fissure/constipation, bowel regimen started. KUB 4/12/19 No stool, no free air, no distended loops of small bowel, fibroids. Refused further workup.   Other issues while admitted:  Hypotension - home diltiazem held per cardio  dizziness - sporadic, self resolving.  Pt deferred MRI.  May be 2/2 to hx of Meniere's disease.      *TTE 3/18/19 EF 60%, mod-severe MR.   MRI brain 3/29/19 cortically based restricted diffusion within R>L frontoparietal region as well as additional scattered small foci, may represent evolution of a recent ischemia vs improving encephalitis. Patient medically optimized and cleared for discharge to acute rehab at [facility] on 4/19/19.   At [facility] patient presented with rectal pain, fluctuating tachycardia 110-150 at rest and orthostatic hypotension. GI, hematology, cardiology and pulmonary evaluation requested. Rectal bleeding developed in the morning of 4/21/19 with severe hypotension and tachycardia. RRT called. Patient transferred to ICU. Patient's course in ICU reviewed and discussed with staff. Investigations, current lab results and recent treatments also reviewed and discussed. Patient was attempted to be evaluated by PT twice but unable to tolerate evaluation due to severe tachycardia up to 150 at rest and symptomatic orthostatic hypotension as low as 80s. Patient is preparing for colonoscopy today to evaluate the source of rectal bleed. S/p multiple PRBC transfusions. Presently on IV antibiotics and IV steroids. (09 May 2019 12:11)      PAST MEDICAL & SURGICAL HISTORY:  Interstitial lung disease: on home o2 prn  NHL (non-Hodgkin's lymphoma): Agem 43 sp chemo/rt/stem cell  Transient cerebral ischemia, unspecified type  Mitral prolapse  History of tonsillectomy  History of appendectomy       REVIEW OF SYMPTOMS  [X] Constitutional WNL          [X] Resp WNL                                [X]  WNL              [X] Endo WNL                     [X] Skin WNL                 [X] MSK WNL              [X] Cognitive WNL         VITALS  Vital Signs Last 24 Hrs  T(C): 36.4 (05 Jun 2019 09:08), Max: 36.9 (04 Jun 2019 21:00)  T(F): 97.6 (05 Jun 2019 09:08), Max: 98.4 (04 Jun 2019 21:00)  HR: 80 (05 Jun 2019 09:08) (80 - 108)  BP: 116/76 (05 Jun 2019 09:08) (103/64 - 117/79)  BP(mean): --  RR: 14 (05 Jun 2019 09:08) (14 - 14)  SpO2: 96% (05 Jun 2019 09:08) (96% - 96%)      PHYSICAL EXAM  Constitutional - Frail.   HEENT - NCAT, EOMI  Neck - Supple, No limited ROM  Chest - CTA bilaterally, No wheeze, No rhonchi, No crackles  Cardiovascular - RRR, S1S2, No murmurs  Abdomen - BS+, Soft, NTND  Extremities - No C/C/E, No calf tenderness   Skin-no rash  Wounds-na      Neurologic Exam -  no new deficits.                   Balance - impaired     Psychiatric - Mood stable, Affect WNL         FUNCTIONAL PROGRESS  Gait - min A  ADLs - min A  Transfers - min/CG A  Functional transfer - min/CG A    RECENT LABS                        9.8    7.01  )-----------( 434      ( 05 Jun 2019 05:45 )             30.8     06-05    143  |  106  |  13  ----------------------------<  87  3.5   |  30  |  0.67    Ca    8.7      05 Jun 2019 05:45    TPro  5.5<L>  /  Alb  2.3<L>  /  TBili  0.1<L>  /  DBili  x   /  AST  13  /  ALT  11  /  AlkPhos  65  06-05      LIVER FUNCTIONS - ( 05 Jun 2019 05:45 )  Alb: 2.3 g/dL / Pro: 5.5 g/dL / ALK PHOS: 65 U/L / ALT: 11 U/L DA / AST: 13 U/L / GGT: x                 Valproic Acid Level, Serum: 38 ug/mL (05-10-19 @ 14:31)  Valproic Acid Level, Serum: 60 ug/mL (04-22-19 @ 13:40)            RADIOLOGY/OTHER RESULTS      CURRENT MEDICATIONS  MEDICATIONS  (STANDING):  aspirin enteric coated 81 milliGRAM(s) Oral daily  buDESOnide  80 MICROgram(s)/formoterol 4.5 MICROgram(s) Inhaler 2 Puff(s) Inhalation two times a day  cyanocobalamin 1000 MICROGram(s) Oral daily  dicyclomine 20 milliGRAM(s) Oral four times a day before meals  enoxaparin Injectable 40 milliGRAM(s) SubCutaneous daily  lidocaine   Patch 1 Patch Transdermal <User Schedule>  metoprolol tartrate 12.5 milliGRAM(s) Oral every 12 hours  mirtazapine 7.5 milliGRAM(s) Oral at bedtime  pantoprazole    Tablet 40 milliGRAM(s) Oral before breakfast  tiotropium 18 MICROgram(s) Capsule 1 Capsule(s) Inhalation daily  valproic acid 250 milliGRAM(s) Oral two times a day  zinc oxide 20% Ointment 1 Application(s) Topical daily    MEDICATIONS  (PRN):  acetaminophen   Tablet .. 650 milliGRAM(s) Oral every 6 hours PRN Moderate Pain (4 - 6)  aluminum hydroxide/magnesium hydroxide/simethicone Suspension 30 milliLiter(s) Oral every 6 hours PRN Dyspepsia  benzocaine 15 mG/menthol 3.6 mG Lozenge 1 Lozenge Oral three times a day PRN Sore Throat  ondansetron    Tablet 4 milliGRAM(s) Oral every 6 hours PRN Nausea and/or Vomiting  ondansetron    Tablet 4 milliGRAM(s) Oral every 6 hours PRN Nausea      ASSESSMENT & PLAN          GI/Bowel Management - mom   Management - Toilet Q2  Skin - Turn Q2  Pain - Tylenol PRN  DVT PPX - Lovenox  Diet - reg c thins    Continue comprehensive acute rehab program consisting of 3hrs/day of OT/PT and SLP.

## 2019-06-05 NOTE — PROGRESS NOTE ADULT - NSHPATTENDINGPLANDISCUSS_GEN_ALL_CORE
rodrick
patient; rehab team
patient; rehab team
patient, rehab team
patient; rehab team

## 2019-06-05 NOTE — PROGRESS NOTE ADULT - SUBJECTIVE AND OBJECTIVE BOX
Patient is a 59 year old  Female who presents with a chief complaint of debility/functional deficits (04 Jun 2019 16:09)        MEDICATIONS  (STANDING):  aspirin enteric coated 81 milliGRAM(s) Oral daily  buDESOnide  80 MICROgram(s)/formoterol 4.5 MICROgram(s) Inhaler 2 Puff(s) Inhalation two times a day  cyanocobalamin 1000 MICROGram(s) Oral daily  dicyclomine 20 milliGRAM(s) Oral four times a day before meals  enoxaparin Injectable 40 milliGRAM(s) SubCutaneous daily  lidocaine   Patch 1 Patch Transdermal <User Schedule>  metoprolol tartrate 12.5 milliGRAM(s) Oral every 12 hours  mirtazapine 7.5 milliGRAM(s) Oral at bedtime  pantoprazole    Tablet 40 milliGRAM(s) Oral before breakfast  tiotropium 18 MICROgram(s) Capsule 1 Capsule(s) Inhalation daily  valproic acid 250 milliGRAM(s) Oral two times a day  zinc oxide 20% Ointment 1 Application(s) Topical daily    MEDICATIONS  (PRN):  acetaminophen   Tablet .. 650 milliGRAM(s) Oral every 6 hours PRN Moderate Pain (4 - 6)  aluminum hydroxide/magnesium hydroxide/simethicone Suspension 30 milliLiter(s) Oral every 6 hours PRN Dyspepsia  benzocaine 15 mG/menthol 3.6 mG Lozenge 1 Lozenge Oral three times a day PRN Sore Throat  ondansetron    Tablet 4 milliGRAM(s) Oral every 6 hours PRN Nausea and/or Vomiting  ondansetron    Tablet 4 milliGRAM(s) Oral every 6 hours PRN Nausea          PHYSICAL EXAM:  T(C): 36.4 (06-05-19 @ 09:08), Max: 36.9 (06-04-19 @ 21:00)  HR: 80 (06-05-19 @ 09:08) (80 - 118)  BP: 116/76 (06-05-19 @ 09:08) (101/66 - 117/79)  RR: 14 (06-05-19 @ 09:08) (14 - 14)  SpO2: 96% (06-05-19 @ 09:08) (96% - 96%)          LABS:                        9.8    7.01  )-----------( 434      ( 05 Jun 2019 05:45 )             30.8     06-05    143  |  106  |  13  ----------------------------<  87  3.5   |  30  |  0.67    Ca    8.7      05 Jun 2019 05:45    TPro  5.5<L>  /  Alb  2.3<L>  /  TBili  0.1<L>  /  DBili  x   /  AST  13  /  ALT  11  /  AlkPhos  65  06-05      RADIOLOGY & ADDITIONAL TESTS:    Consultant(s) Notes Reviewed:  [x] YES  [ ] NO    Care Discussed with Consultants/Other Providers [x] YES  [ ] NO Patient is a 59 year old  Female who presents with a chief complaint of debility/functional deficits (04 Jun 2019 16:09)    Patient seen and examined at bedside, no acute complaints    MEDICATIONS  (STANDING):  aspirin enteric coated 81 milliGRAM(s) Oral daily  buDESOnide  80 MICROgram(s)/formoterol 4.5 MICROgram(s) Inhaler 2 Puff(s) Inhalation two times a day  cyanocobalamin 1000 MICROGram(s) Oral daily  dicyclomine 20 milliGRAM(s) Oral four times a day before meals  enoxaparin Injectable 40 milliGRAM(s) SubCutaneous daily  lidocaine   Patch 1 Patch Transdermal <User Schedule>  metoprolol tartrate 12.5 milliGRAM(s) Oral every 12 hours  mirtazapine 7.5 milliGRAM(s) Oral at bedtime  pantoprazole    Tablet 40 milliGRAM(s) Oral before breakfast  tiotropium 18 MICROgram(s) Capsule 1 Capsule(s) Inhalation daily  valproic acid 250 milliGRAM(s) Oral two times a day  zinc oxide 20% Ointment 1 Application(s) Topical daily    MEDICATIONS  (PRN):  acetaminophen   Tablet .. 650 milliGRAM(s) Oral every 6 hours PRN Moderate Pain (4 - 6)  aluminum hydroxide/magnesium hydroxide/simethicone Suspension 30 milliLiter(s) Oral every 6 hours PRN Dyspepsia  benzocaine 15 mG/menthol 3.6 mG Lozenge 1 Lozenge Oral three times a day PRN Sore Throat  ondansetron    Tablet 4 milliGRAM(s) Oral every 6 hours PRN Nausea and/or Vomiting  ondansetron    Tablet 4 milliGRAM(s) Oral every 6 hours PRN Nausea    REVIEW OF SYSTEMS:  CONSTITUTIONAL: No fever, weight loss, has fatigue  EYES: No eye pain, visual disturbances, or discharge  ENMT:  No difficulty hearing, tinnitus, vertigo; No sinus or throat pain  NECK: No pain or stiffness  RESPIRATORY: No cough, wheezing, chills or hemoptysis; intermittent shortness of breath  CARDIOVASCULAR: No chest pain, palpitations, dizziness, or leg swelling  GASTROINTESTINAL: No abdominal or epigastric pain, no nausea, no vomiting, or hematemesis; No diarrhea, has constipation, rectal pain. No melena or hematochezia.  GENITOURINARY: No dysuria, frequency, hematuria, or incontinence  NEUROLOGICAL: No headaches, memory loss, has loss of strength, and numbness, no tremors  SKIN: No itching, burning, rashes, or lesions   ENDOCRINE: No heat or cold intolerance; No hair loss  MUSCULOSKELETAL: No joint pain or swelling; No muscle pain, has back pain  PSYCHIATRIC: has depression, anxiety, mood swings, difficulty sleeping  HEME/LYMPH: No easy bruising, or bleeding gums  ALLERGY AND IMMUNOLOGIC: No hives or eczema      PHYSICAL EXAM:  T(C): 36.4 (06-05-19 @ 09:08), Max: 36.9 (06-04-19 @ 21:00)  HR: 80 (06-05-19 @ 09:08) (80 - 118)  BP: 116/76 (06-05-19 @ 09:08) (101/66 - 117/79)  RR: 14 (06-05-19 @ 09:08) (14 - 14)  SpO2: 96% (06-05-19 @ 09:08) (96% - 96%)        GENERAL: NAD  HEAD:  Atraumatic, Normocephalic  EYES: EOMI, PERRL, conjunctiva and sclera clear  ENMT:  Moist mucous membranes, Good dentition, No lesions  NECK: Supple, No JVD, Normal thyroid  NERVOUS SYSTEM:  Alert & Oriented X3, no new deficit  CHEST/LUNG: Clear to ascultation bilaterally; No rales, rhonchi, wheezing, or rubs  HEART: Regular rate and rhythm; has murmurs, no rubs, or gallops  ABDOMEN: Soft, Nontender, Nondistended; Bowel sounds present  EXTREMITIES:  2+ Peripheral Pulses, No clubbing, cyanosis, or edema  SKIN: No rash  PSYCH: calm, mild anxiety      LABS:                        9.8    7.01  )-----------( 434      ( 05 Jun 2019 05:45 )             30.8     06-05    143  |  106  |  13  ----------------------------<  87  3.5   |  30  |  0.67    Ca    8.7      05 Jun 2019 05:45    TPro  5.5<L>  /  Alb  2.3<L>  /  TBili  0.1<L>  /  DBili  x   /  AST  13  /  ALT  11  /  AlkPhos  65  06-05      RADIOLOGY & ADDITIONAL TESTS:    Consultant(s) Notes Reviewed:  [x] YES  [ ] NO    Care Discussed with Consultants/Other Providers [x] YES  [ ] NO

## 2019-06-06 ENCOUNTER — TRANSCRIPTION ENCOUNTER (OUTPATIENT)
Age: 59
End: 2019-06-06

## 2019-06-06 VITALS — OXYGEN SATURATION: 96 %

## 2019-06-06 PROCEDURE — 99239 HOSP IP/OBS DSCHRG MGMT >30: CPT

## 2019-06-06 PROCEDURE — 99233 SBSQ HOSP IP/OBS HIGH 50: CPT

## 2019-06-06 RX ORDER — VALPROIC ACID (AS SODIUM SALT) 250 MG/5ML
1 SOLUTION, ORAL ORAL
Qty: 60 | Refills: 0
Start: 2019-06-06 | End: 2019-07-05

## 2019-06-06 RX ORDER — PANTOPRAZOLE SODIUM 20 MG/1
1 TABLET, DELAYED RELEASE ORAL
Qty: 30 | Refills: 0
Start: 2019-06-06 | End: 2019-07-05

## 2019-06-06 RX ORDER — MIRTAZAPINE 45 MG/1
1 TABLET, ORALLY DISINTEGRATING ORAL
Qty: 30 | Refills: 0
Start: 2019-06-06 | End: 2019-07-05

## 2019-06-06 RX ORDER — METOPROLOL TARTRATE 50 MG
0.5 TABLET ORAL
Qty: 30 | Refills: 0
Start: 2019-06-06 | End: 2019-07-05

## 2019-06-06 RX ORDER — ALPRAZOLAM 0.25 MG
0.5 TABLET ORAL
Qty: 0 | Refills: 0 | DISCHARGE

## 2019-06-06 RX ORDER — PREGABALIN 225 MG/1
1 CAPSULE ORAL
Qty: 0 | Refills: 0 | DISCHARGE
Start: 2019-06-06

## 2019-06-06 RX ORDER — BUDESONIDE AND FORMOTEROL FUMARATE DIHYDRATE 160; 4.5 UG/1; UG/1
1 AEROSOL RESPIRATORY (INHALATION)
Qty: 1 | Refills: 0
Start: 2019-06-06 | End: 2019-07-05

## 2019-06-06 RX ADMIN — BENZOCAINE AND MENTHOL 1 LOZENGE: 5; 1 LIQUID ORAL at 05:35

## 2019-06-06 RX ADMIN — Medication 650 MILLIGRAM(S): at 05:30

## 2019-06-06 RX ADMIN — Medication 250 MILLIGRAM(S): at 05:30

## 2019-06-06 RX ADMIN — PANTOPRAZOLE SODIUM 40 MILLIGRAM(S): 20 TABLET, DELAYED RELEASE ORAL at 05:30

## 2019-06-06 RX ADMIN — ENOXAPARIN SODIUM 40 MILLIGRAM(S): 100 INJECTION SUBCUTANEOUS at 11:11

## 2019-06-06 RX ADMIN — Medication 650 MILLIGRAM(S): at 06:30

## 2019-06-06 RX ADMIN — TIOTROPIUM BROMIDE 1 CAPSULE(S): 18 CAPSULE ORAL; RESPIRATORY (INHALATION) at 09:05

## 2019-06-06 NOTE — CHART NOTE - NSCHARTNOTEFT_GEN_A_CORE
Nutrition Follow Up Note  Hospital Course (Per Electronic Medical Record):   Source: Medical Record [X] Patient [X]    Diet: Lacto -Ovo vegetarian, Ensure pudding TID    Pt reports good appetite: consuming 100% of meals per chart review. Pt not consuming Ensure Pudding (discontinuation order still pending). Pt reports no nutrition related complaints- states that she is going home today.     Current Weight:   (5/27) 119.4lbs  (06/03) 124.7lbs   Weights trending up    PERTINENT MEDS:   Pertinent Medications: MEDICATIONS  (STANDING):  aspirin enteric coated 81 milliGRAM(s) Oral daily  buDESOnide  80 MICROgram(s)/formoterol 4.5 MICROgram(s) Inhaler 2 Puff(s) Inhalation two times a day  cyanocobalamin 1000 MICROGram(s) Oral daily  dicyclomine 20 milliGRAM(s) Oral four times a day before meals  enoxaparin Injectable 40 milliGRAM(s) SubCutaneous daily  lidocaine   Patch 1 Patch Transdermal <User Schedule>  metoprolol tartrate 12.5 milliGRAM(s) Oral every 12 hours  mirtazapine 7.5 milliGRAM(s) Oral at bedtime  pantoprazole    Tablet 40 milliGRAM(s) Oral before breakfast  tiotropium 18 MICROgram(s) Capsule 1 Capsule(s) Inhalation daily  valproic acid 250 milliGRAM(s) Oral two times a day  zinc oxide 20% Ointment 1 Application(s) Topical daily    MEDICATIONS  (PRN):  acetaminophen   Tablet .. 650 milliGRAM(s) Oral every 6 hours PRN Moderate Pain (4 - 6)  aluminum hydroxide/magnesium hydroxide/simethicone Suspension 30 milliLiter(s) Oral every 6 hours PRN Dyspepsia  benzocaine 15 mG/menthol 3.6 mG Lozenge 1 Lozenge Oral three times a day PRN Sore Throat  ondansetron    Tablet 4 milliGRAM(s) Oral every 6 hours PRN Nausea and/or Vomiting  ondansetron    Tablet 4 milliGRAM(s) Oral every 6 hours PRN Nausea      PERTINENT LABS:  06-05 Na143 mmol/L Glu 87 mg/dL K+ 3.5 mmol/L Cr  0.67 mg/dL BUN 13 mg/dL 06-05 Alb 2.3 g/dL<L>      Skin: Intact    Edema: none    Last BM: on 6/03    Estimated Needs:   [X] No Change since Previous Assessment  [X] Recalculated:     Previous Nutrition Diagnosis: Severe malnutrition    Nutrition Diagnosis is [X] Ongoing      New Nutrition Diagnosis: [X] Not Applicable    Interventions:   1. Recommend discontinue po supplement  2. Continue current diet    Monitoring & Evaluation: will monitor:  [X] Weights   [X] PO Intake   [X] Follow Up (Per Protocol)  [X] Tolerance to Diet Prescription       RD to follow as per Nutrition protocol  Nicole Benson RDN

## 2019-06-06 NOTE — DISCHARGE NOTE PROVIDER - NSDCFUADDAPPT_GEN_ALL_CORE_FT
Also follow up with Lymphoma specialist-oncology/continue evaluation started at [facility]. Follow up with Dr Ramirez/neurology in 1 week regarding Seizure medication management (Depakote).

## 2019-06-06 NOTE — PROGRESS NOTE ADULT - SUBJECTIVE AND OBJECTIVE BOX
Patient is a 59y old  Female who presents with a chief complaint of debility/functional deficits (06 Jun 2019 09:24)  No acute complaints      Patient seen and examined at bedside.    ALLERGIES:  IV Contrast (Anaphylaxis)  shellfish. (Anaphylaxis)        Vital Signs Last 24 Hrs  T(F): 97.5 (06 Jun 2019 07:53), Max: 97.5 (06 Jun 2019 07:53)  HR: 94 (06 Jun 2019 09:07) (87 - 96)  BP: 117/78 (06 Jun 2019 07:53) (105/70 - 117/78)  RR: 14 (06 Jun 2019 07:53) (14 - 14)  SpO2: 96% (06 Jun 2019 09:07) (96% - 98%)  I&O's Summary    MEDICATIONS:  acetaminophen   Tablet .. 650 milliGRAM(s) Oral every 6 hours PRN  aluminum hydroxide/magnesium hydroxide/simethicone Suspension 30 milliLiter(s) Oral every 6 hours PRN  aspirin enteric coated 81 milliGRAM(s) Oral daily  benzocaine 15 mG/menthol 3.6 mG Lozenge 1 Lozenge Oral three times a day PRN  buDESOnide  80 MICROgram(s)/formoterol 4.5 MICROgram(s) Inhaler 2 Puff(s) Inhalation two times a day  cyanocobalamin 1000 MICROGram(s) Oral daily  dicyclomine 20 milliGRAM(s) Oral four times a day before meals  enoxaparin Injectable 40 milliGRAM(s) SubCutaneous daily  lidocaine   Patch 1 Patch Transdermal <User Schedule>  metoprolol tartrate 12.5 milliGRAM(s) Oral every 12 hours  mirtazapine 7.5 milliGRAM(s) Oral at bedtime  ondansetron    Tablet 4 milliGRAM(s) Oral every 6 hours PRN  ondansetron    Tablet 4 milliGRAM(s) Oral every 6 hours PRN  pantoprazole    Tablet 40 milliGRAM(s) Oral before breakfast  tiotropium 18 MICROgram(s) Capsule 1 Capsule(s) Inhalation daily  valproic acid 250 milliGRAM(s) Oral two times a day  zinc oxide 20% Ointment 1 Application(s) Topical daily      PHYSICAL EXAM:  General: NAD  ENT: MMM  Neck: Supple, No JVD  Lungs: Clear to auscultation bilaterally  Cardio: RRR, S1/S2, No murmurs  Abdomen: Soft, Nontender, Nondistended; Bowel sounds present  Extremities: No cyanosis, No edema    LABS:                        9.8    7.01  )-----------( 434      ( 05 Jun 2019 05:45 )             30.8     06-05    143  |  106  |  13  ----------------------------<  87  3.5   |  30  |  0.67    Ca    8.7      05 Jun 2019 05:45    TPro  5.5  /  Alb  2.3  /  TBili  0.1  /  DBili  x   /  AST  13  /  ALT  11  /  AlkPhos  65  06-05    eGFR if Non African American: 95 mL/min/1.73M2 (06-05-19 @ 05:45)  eGFR if African American: 110 mL/min/1.73M2 (06-05-19 @ 05:45)            03-13 Chol 193 mg/dL  mg/dL HDL 62 mg/dL Trig 112 mg/dL        CAPILLARY BLOOD GLUCOSE                RADIOLOGY & ADDITIONAL TESTS:    Care Discussed with Consultants/Other Providers:

## 2019-06-06 NOTE — PROGRESS NOTE ADULT - ASSESSMENT
58 yo RHWF female with complex medical history  s/p suspected multiple  CVAs vs. encephalitis being readmitted to acute rehabilitation unit  with spastic quadriparesis, more pronounced in left UE, sensory impairment, gait impairment  and functional deficits.

## 2019-06-06 NOTE — DISCHARGE NOTE PROVIDER - NSDCCPCAREPLAN_GEN_ALL_CORE_FT
PRINCIPAL DISCHARGE DIAGNOSIS  Diagnosis: Spastic quadriparesis  Assessment and Plan of Treatment: Spastic quadriparesis      SECONDARY DISCHARGE DIAGNOSES  Diagnosis: Hemorrhoids  Assessment and Plan of Treatment:     Diagnosis: Tachycardia  Assessment and Plan of Treatment:     Diagnosis: Interstitial lung disease  Assessment and Plan of Treatment: Interstitial lung disease    Diagnosis: IBS (irritable bowel syndrome)  Assessment and Plan of Treatment: IBS (irritable bowel syndrome)    Diagnosis: Seizure  Assessment and Plan of Treatment: Seizure

## 2019-06-06 NOTE — DISCHARGE NOTE PROVIDER - CARE PROVIDERS DIRECT ADDRESSES
,DirectAddress_Unknown,DirectAddress_Unknown,DirectAddress_Unknown,DirectAddress_Unknown,DirectAddress_Unknown ,DirectAddress_Unknown,DirectAddress_Unknown,DirectAddress_Unknown,DirectAddress_Unknown,DirectAddress_Unknown,DirectAddress_Unknown

## 2019-06-06 NOTE — DISCHARGE NOTE PROVIDER - PROVIDER TOKENS
PROVIDER:[TOKEN:[[id number]]],FREE:[LAST:[angela],FIRST:[pro],PHONE:[phone]],FAX:[(   )    -],ADDRESS:[neurology]],FREE:[LAST:[lacie],FIRST:[damari],PHONE:[phone]:[(   )    -],ADDRESS:[Pulmonary]],FREE:[LAST:[da],FIRST:[master],PHONE:[phone]],FAX:[(   )    -],ADDRESS:[cardiology]],FREE:[LAST:[shaan],FIRST:[agnieszka],PHONE:[phone]],FAX:[(   )    -],ADDRESS:[neurology]] PROVIDER:[TOKEN:[[id number]]],FREE:[LAST:[angela],FIRST:[pro],PHONE:[phone]],FAX:[(   )    -],ADDRESS:[neurology]],FREE:[LAST:[lacie],FIRST:[damari],PHONE:[phone]],FAX:[(   )    -],ADDRESS:[Pulmonary]],FREE:[LAST:[da],FIRST:[master],PHONE:[phone]],FAX:[(   )    -],ADDRESS:[cardiology]],FREE:[LAST:[shaan],FIRST:[agnieszka],PHONE:[phone]],FAX:[(   )    -],ADDRESS:[neurology]],FREE:[LAST:[rocky],FIRST:[darryl],PHONE:[phone]],FAX:[(   )    -],ADDRESS:[Neuro-oncology]]

## 2019-06-06 NOTE — PROGRESS NOTE ADULT - ATTENDING COMMENTS
Ready for discharge, no complaints offered  Stable neurological exam    Patient is being discharged home with home care.  Discharge instructions were discussed with patient and family, all current medications were sent to the pharmacy. Patient and family were educated on importance of medication compliance,  continued  care with PMD and follow-up care with the specialists in the community. Safety and fall risk precautions  were discussed in detail, counseled on healthy life style modifications.  All questions were answered to their satisfaction.    Follow up with primary Neurology regarding further investigations and treatment options   Depakote therapy maintenance to be determined by Dr. Ramirez.  Patient reports some mild tremors, most likely related to Depakote. She is aon very small maintenance dose now.  States that can't ne on Keppra, was earlier discontinued due to " severe side effects ". No clearly confirmed Sz in the past?

## 2019-06-06 NOTE — PROGRESS NOTE ADULT - SUBJECTIVE AND OBJECTIVE BOX
CHIEF COMPLAINT: no diarrhea.       HISTORY OF PRESENT ILLNESS  58 yo female with PMHx of MVP, chronic SDH, interstitial lung disease/pneumothorax, pulmonary nodules, meniere's, non hodgkins lymphoma (SCD/XRT/chemo at [facility] [place]), TIA, tachycardia, occipital neuralgia, and neuropathy.  Patient was initially admitted to [facility] on 3/13/19 with acute onset of b/l UE weakness associated with nausea, blurry vision, and HA. CT head showed chronic B/L frontal SDH. The rest of the workup was negative. CVA/TIA r/out.  Pt was discharged home but on 3/17 had apparent seizure.  She was admitted to [facility] and intubated and placed on depakote.  CTA H/N, MRI, EEG unremarkable.  ID Recommended LP for fever; family deferred.  Transferred to [facility] on 3/19/19.  MRI brain showed cerebral ischemia. The rest of the workup was negative, this including CSF studies. EEG neg seizures. (Evaluated by lymphoma specialist/onco, PET 4/5/19 r/o recurrence of lymphoma. Increased signal at base of tongue to follow up outpt ENT). Additionally, course complicated with pneumothorax, pulmonary consulted, no interventions, self resolved.  Tachycardia - per pulm, related related to chronic lung disease.  GI consulted for rectal pain; diagnosed c anal fissure/constipation, bowel regimen started. KUB 4/12/19 No stool, no free air, no distended loops of small bowel, fibroids. Refused further workup.   Other issues while admitted:  Hypotension - home diltiazem held per cardio  dizziness - sporadic, self resolving.  Pt deferred MRI.  May be 2/2 to hx of Meniere's disease.      *TTE 3/18/19 EF 60%, mod-severe MR.   MRI brain 3/29/19 cortically based restricted diffusion within R>L frontoparietal region as well as additional scattered small foci, may represent evolution of a recent ischemia vs improving encephalitis. Patient medically optimized and cleared for discharge to acute rehab at [facility] on 4/19/19.   At [facility] patient presented with rectal pain, fluctuating tachycardia 110-150 at rest and orthostatic hypotension. GI, hematology, cardiology and pulmonary evaluation requested. Rectal bleeding developed in the morning of 4/21/19 with severe hypotension and tachycardia. RRT called. Patient transferred to ICU. Patient's course in ICU reviewed and discussed with staff. Investigations, current lab results and recent treatments also reviewed and discussed. Patient was attempted to be evaluated by PT twice but unable to tolerate evaluation due to severe tachycardia up to 150 at rest and symptomatic orthostatic hypotension as low as 80s. Patient is preparing for colonoscopy today to evaluate the source of rectal bleed. S/p multiple PRBC transfusions. Presently on IV antibiotics and IV steroids. (09 May 2019 12:11)      PAST MEDICAL & SURGICAL HISTORY:  Interstitial lung disease: on home o2 prn  NHL (non-Hodgkin's lymphoma): Agem 45 sp chemo/rt/stem cell  Transient cerebral ischemia, unspecified type  Mitral prolapse  History of tonsillectomy  History of appendectomy        REVIEW OF SYMPTOMS  [X] Constitutional WNL         [X] Resp WNL           [X] GI WNL                         [X] Endo WNL            [X] MSK WNL                   [X] Cognitive WNL             VITALS  Vital Signs Last 24 Hrs  T(C): 36.4 (06 Jun 2019 07:53), Max: 36.4 (06 Jun 2019 07:53)  T(F): 97.5 (06 Jun 2019 07:53), Max: 97.5 (06 Jun 2019 07:53)  HR: 94 (06 Jun 2019 09:07) (87 - 96)  BP: 117/78 (06 Jun 2019 07:53) (105/70 - 117/78)  BP(mean): --  RR: 14 (06 Jun 2019 07:53) (14 - 14)  SpO2: 96% (06 Jun 2019 09:07) (96% - 98%)      PHYSICAL EXAM  Constitutional - Frail  HEENT - NCAT, EOMI  Neck - Supple, No limited ROM  Chest - CTA bilaterally, No wheeze, No rhonchi, No crackles  Cardiovascular - RRR, S1S2, No murmurs  Abdomen - BS+, Soft, NTND  Extremities - No C/C/E, No calf tenderness   Skin-no rash  Wounds-sacrum c redness.     PHYSICAL EXAM  Constitutional - Frail.   HEENT - NCAT, EOMI  Neck - Supple, No limited ROM  Chest - CTA bilaterally, No wheeze, No rhonchi, No crackles  Cardiovascular - RRR, S1S2, No murmurs  Abdomen - BS+, Soft, NTND  Extremities - No C/C/E, No calf tenderness   Skin-no rash  Wounds-na      Neurologic Exam -  no new deficits.                   Balance - impaired     Psychiatric - Mood stable, Affect WNL         FUNCTIONAL PROGRESS  Gait - min A  ADLs - min A  Transfers - min/CG A  Functional transfer - min/CG A      RECENT LABS                        9.8    7.01  )-----------( 434      ( 05 Jun 2019 05:45 )             30.8     06-05    143  |  106  |  13  ----------------------------<  87  3.5   |  30  |  0.67    Ca    8.7      05 Jun 2019 05:45    TPro  5.5<L>  /  Alb  2.3<L>  /  TBili  0.1<L>  /  DBili  x   /  AST  13  /  ALT  11  /  AlkPhos  65  06-05      LIVER FUNCTIONS - ( 05 Jun 2019 05:45 )  Alb: 2.3 g/dL / Pro: 5.5 g/dL / ALK PHOS: 65 U/L / ALT: 11 U/L DA / AST: 13 U/L / GGT: x                 Valproic Acid Level, Serum: 38 ug/mL (05-10-19 @ 14:31)  Valproic Acid Level, Serum: 60 ug/mL (04-22-19 @ 13:40)            RADIOLOGY/OTHER RESULTS      CURRENT MEDICATIONS  MEDICATIONS  (STANDING):  aspirin enteric coated 81 milliGRAM(s) Oral daily  buDESOnide  80 MICROgram(s)/formoterol 4.5 MICROgram(s) Inhaler 2 Puff(s) Inhalation two times a day  cyanocobalamin 1000 MICROGram(s) Oral daily  dicyclomine 20 milliGRAM(s) Oral four times a day before meals  enoxaparin Injectable 40 milliGRAM(s) SubCutaneous daily  lidocaine   Patch 1 Patch Transdermal <User Schedule>  metoprolol tartrate 12.5 milliGRAM(s) Oral every 12 hours  mirtazapine 7.5 milliGRAM(s) Oral at bedtime  pantoprazole    Tablet 40 milliGRAM(s) Oral before breakfast  tiotropium 18 MICROgram(s) Capsule 1 Capsule(s) Inhalation daily  valproic acid 250 milliGRAM(s) Oral two times a day  zinc oxide 20% Ointment 1 Application(s) Topical daily    MEDICATIONS  (PRN):  acetaminophen   Tablet .. 650 milliGRAM(s) Oral every 6 hours PRN Moderate Pain (4 - 6)  aluminum hydroxide/magnesium hydroxide/simethicone Suspension 30 milliLiter(s) Oral every 6 hours PRN Dyspepsia  benzocaine 15 mG/menthol 3.6 mG Lozenge 1 Lozenge Oral three times a day PRN Sore Throat  ondansetron    Tablet 4 milliGRAM(s) Oral every 6 hours PRN Nausea and/or Vomiting  ondansetron    Tablet 4 milliGRAM(s) Oral every 6 hours PRN Nausea      ASSESSMENT & PLAN        GI/Bowel Management - mom   Management - Toilet Q2  Skin - Turn Q2  Pain - Tylenol PRN  DVT PPX - Lovenox  Diet - reg c thins      Completed comprehensive acute rehab program consisting of 3hrs/day of OT/PT and SLP.

## 2019-06-06 NOTE — PROGRESS NOTE ADULT - REASON FOR ADMISSION
debility/functional deficits

## 2019-06-06 NOTE — DISCHARGE NOTE PROVIDER - HOSPITAL COURSE
60 yo female with PMHx of MVP, chronic SDH, interstitial lung disease/pneumothorax, pulmonary nodules, meniere's, non hodgkins lymphoma (SCD/XRT/chemo at [facility] 2003), TIA, tachycardia, occipital neuralgia, and neuropathy.  Patient was initially admitted to [place] on 3/13/19 with acute onset of b/l UE weakness associated with nausea, blurry vision, and HA. CT head showed chronic B/L frontal SDH. The rest of the workup was negative. CVA/TIA r/out.  Pt was discharged home but on 3/17 had apparent seizure.  She was admitted to [facility] and intubated and placed on depakote.  CTA H/N, MRI, EEG unremarkable.  ID Recommended LP for fever; family deferred.  Transferred to [facility] on 3/19/19.  MRI brain showed cerebral ischemia. The rest of the workup was negative, this including CSF studies. EEG neg seizures. (Evaluated by lymphoma specialist/onco, PET 4/5/19 r/o recurrence of lymphoma. Increased signal at base of tongue to follow up outpt ENT). Additionally, course complicated with pneumothorax, pulmonary consulted, no interventions, self resolved.  Tachycardia - per pulm, related related to chronic lung disease.  GI consulted for rectal pain; diagnosed c anal fissure/constipation, bowel regimen started. KUB 4/12/19 No stool, no free air, no distended loops of small bowel, fibroids. Refused further workup.     Other issues while admitted:    Hypotension - home diltiazem held per cardio    dizziness - sporadic, self resolving.  Pt deferred MRI.  May be 2/2 to hx of Meniere's disease.        *TTE 3/18/19 EF 60%, mod-severe MR.     MRI brain 3/29/19 cortically based restricted diffusion within R>L frontoparietal region as well as additional scattered small foci, may represent evolution of a recent ischemia vs improving encephalitis. Patient medically optimized and cleared for discharge to acute rehab at [facility] on 4/19/19.     At [facility] patient presented with rectal pain, fluctuating tachycardia 110-150 at rest and orthostatic hypotension. GI, hematology, cardiology and pulmonary evaluation requested. Rectal bleeding developed in the morning of 4/21/19 with severe hypotension and tachycardia. RRT called. Patient transferred to ICU. Patient's course in ICU reviewed and discussed with staff. Investigations, current lab results and recent treatments also reviewed and discussed. Patient was attempted to be evaluated by PT twice but unable to tolerate evaluation due to severe tachycardia up to 150 at rest and symptomatic orthostatic hypotension as low as 80s. Patient is preparing for colonoscopy today to evaluate the source of rectal bleed. S/p multiple PRBC transfusions. Presently on IV antibiotics and IV steroids.     Completed comprehensive rehab course at [facility], three disciplines PT/OT/SLP. Performs activities with min A. Tolerating regular diet. Patient evaluated by cardiology and GI. Metoprolol for tachycardia recommended. Bentyl for IBS related diarrhea. Leukocytosis evaluated by ID and septic workup negative to date.

## 2019-06-06 NOTE — DISCHARGE NOTE PROVIDER - CARE PROVIDER_API CALL
Last, Joselito ARANA (DO)  Internal Medicine  [facility] [place] [address]  Phone: [phone]  Fax: [phone]  Follow Up Time:     pro miller  neurology  Phone: [phone]  Fax: (   )    -  Follow Up Time:     damari medellin  Pulmonary  Phone: [phone]  Fax: (   )    -  Follow Up Time:     master roberson  cardiology  Phone: [phone]  Fax: (   )    -  Follow Up Time:     agnieszka garduno  neurology  Phone: [phone]  Fax: (   )    -  Follow Up Time: Last, Joselito ARANA (DO)  Internal Medicine  [address] [place] [address]  Phone: [phone]  Fax: [phone]  Follow Up Time:     pro miller  neurology  Phone: [phone]  Fax: (   )    -  Follow Up Time:     damari medellin  Pulmonary  Phone: [phone]  Fax: (   )    -  Follow Up Time:     master roberson  cardiology  Phone: [phone]  Fax: (   )    -  Follow Up Time:     agnieszka garduno  neurology  Phone: [phone]  Fax: (   )    -  Follow Up Time:     darryl hidalgo  Neuro-oncology  Phone: [phone]  Fax: (   )    -  Follow Up Time:

## 2019-06-06 NOTE — DISCHARGE NOTE PROVIDER - NSDCACTIVITY_GEN_ALL_CORE
Walking - Indoors allowed/Sex allowed/Do not drive or operate machinery/Do not make important decisions/No heavy lifting/straining/Showering allowed/Stairs allowed/Walking - Outdoors allowed

## 2019-06-06 NOTE — DISCHARGE NOTE NURSING/CASE MANAGEMENT/SOCIAL WORK - NSDCDPATPORTLINK_GEN_ALL_CORE
You can access the [facility], offered by [facility], by registering with the following website: [email or web address]

## 2019-06-06 NOTE — DISCHARGE NOTE PROVIDER - NSDCHHNEEDSERVICE_GEN_ALL_CORE
Medication teaching and assessment/Teaching and training/Observation and assessment/Rehabilitation services

## 2019-06-06 NOTE — PROGRESS NOTE ADULT - PROVIDER SPECIALTY LIST ADULT
Gastroenterology
Hospitalist
Infectious Disease
Neurology
Rehab Medicine
Pulmonology
Neurology
Cardiology

## 2019-06-07 PROCEDURE — 83615 LACTATE (LD) (LDH) ENZYME: CPT

## 2019-06-07 PROCEDURE — 84145 PROCALCITONIN (PCT): CPT

## 2019-06-07 PROCEDURE — 97110 THERAPEUTIC EXERCISES: CPT

## 2019-06-07 PROCEDURE — 92523 SPEECH SOUND LANG COMPREHEN: CPT

## 2019-06-07 PROCEDURE — 87177 OVA AND PARASITES SMEARS: CPT

## 2019-06-07 PROCEDURE — 70450 CT HEAD/BRAIN W/O DYE: CPT

## 2019-06-07 PROCEDURE — 97112 NEUROMUSCULAR REEDUCATION: CPT

## 2019-06-07 PROCEDURE — 83735 ASSAY OF MAGNESIUM: CPT

## 2019-06-07 PROCEDURE — 81001 URINALYSIS AUTO W/SCOPE: CPT

## 2019-06-07 PROCEDURE — 97116 GAIT TRAINING THERAPY: CPT

## 2019-06-07 PROCEDURE — 93005 ELECTROCARDIOGRAM TRACING: CPT

## 2019-06-07 PROCEDURE — 97167 OT EVAL HIGH COMPLEX 60 MIN: CPT

## 2019-06-07 PROCEDURE — 94640 AIRWAY INHALATION TREATMENT: CPT

## 2019-06-07 PROCEDURE — 97530 THERAPEUTIC ACTIVITIES: CPT

## 2019-06-07 PROCEDURE — 97535 SELF CARE MNGMENT TRAINING: CPT

## 2019-06-07 PROCEDURE — 80048 BASIC METABOLIC PNL TOTAL CA: CPT

## 2019-06-07 PROCEDURE — 36415 COLL VENOUS BLD VENIPUNCTURE: CPT

## 2019-06-07 PROCEDURE — 80164 ASSAY DIPROPYLACETIC ACD TOT: CPT

## 2019-06-07 PROCEDURE — 85027 COMPLETE CBC AUTOMATED: CPT

## 2019-06-07 PROCEDURE — 80053 COMPREHEN METABOLIC PANEL: CPT

## 2019-06-07 PROCEDURE — 97140 MANUAL THERAPY 1/> REGIONS: CPT

## 2019-06-07 PROCEDURE — 97163 PT EVAL HIGH COMPLEX 45 MIN: CPT

## 2019-06-07 PROCEDURE — 92610 EVALUATE SWALLOWING FUNCTION: CPT

## 2019-06-07 PROCEDURE — 92507 TX SP LANG VOICE COMM INDIV: CPT

## 2019-06-11 DIAGNOSIS — R20.8 OTHER DISTURBANCES OF SKIN SENSATION: ICD-10-CM

## 2019-06-11 DIAGNOSIS — F41.8 OTHER SPECIFIED ANXIETY DISORDERS: ICD-10-CM

## 2019-06-11 DIAGNOSIS — E87.6 HYPOKALEMIA: ICD-10-CM

## 2019-06-11 DIAGNOSIS — I95.9 HYPOTENSION, UNSPECIFIED: ICD-10-CM

## 2019-06-11 DIAGNOSIS — R53.81 OTHER MALAISE: ICD-10-CM

## 2019-06-11 DIAGNOSIS — Z92.3 PERSONAL HISTORY OF IRRADIATION: ICD-10-CM

## 2019-06-11 DIAGNOSIS — M54.9 DORSALGIA, UNSPECIFIED: ICD-10-CM

## 2019-06-11 DIAGNOSIS — K58.2 MIXED IRRITABLE BOWEL SYNDROME: ICD-10-CM

## 2019-06-11 DIAGNOSIS — E86.0 DEHYDRATION: ICD-10-CM

## 2019-06-11 DIAGNOSIS — G47.00 INSOMNIA, UNSPECIFIED: ICD-10-CM

## 2019-06-11 DIAGNOSIS — D72.829 ELEVATED WHITE BLOOD CELL COUNT, UNSPECIFIED: ICD-10-CM

## 2019-06-11 DIAGNOSIS — R91.1 SOLITARY PULMONARY NODULE: ICD-10-CM

## 2019-06-11 DIAGNOSIS — H81.09 MENIERE'S DISEASE, UNSPECIFIED EAR: ICD-10-CM

## 2019-06-11 DIAGNOSIS — R19.7 DIARRHEA, UNSPECIFIED: ICD-10-CM

## 2019-06-11 DIAGNOSIS — K62.89 OTHER SPECIFIED DISEASES OF ANUS AND RECTUM: ICD-10-CM

## 2019-06-11 DIAGNOSIS — K59.00 CONSTIPATION, UNSPECIFIED: ICD-10-CM

## 2019-06-11 DIAGNOSIS — E53.8 DEFICIENCY OF OTHER SPECIFIED B GROUP VITAMINS: ICD-10-CM

## 2019-06-11 DIAGNOSIS — Z79.82 LONG TERM (CURRENT) USE OF ASPIRIN: ICD-10-CM

## 2019-06-11 DIAGNOSIS — Z51.89 ENCOUNTER FOR OTHER SPECIFIED AFTERCARE: ICD-10-CM

## 2019-06-11 DIAGNOSIS — Z99.81 DEPENDENCE ON SUPPLEMENTAL OXYGEN: ICD-10-CM

## 2019-06-11 DIAGNOSIS — Z85.72 PERSONAL HISTORY OF NON-HODGKIN LYMPHOMAS: ICD-10-CM

## 2019-06-11 DIAGNOSIS — I62.03 NONTRAUMATIC CHRONIC SUBDURAL HEMORRHAGE: ICD-10-CM

## 2019-06-11 DIAGNOSIS — I67.82 CEREBRAL ISCHEMIA: ICD-10-CM

## 2019-06-11 DIAGNOSIS — J84.9 INTERSTITIAL PULMONARY DISEASE, UNSPECIFIED: ICD-10-CM

## 2019-06-11 DIAGNOSIS — G62.9 POLYNEUROPATHY, UNSPECIFIED: ICD-10-CM

## 2019-06-11 DIAGNOSIS — K21.9 GASTRO-ESOPHAGEAL REFLUX DISEASE WITHOUT ESOPHAGITIS: ICD-10-CM

## 2019-06-11 DIAGNOSIS — G40.909 EPILEPSY, UNSPECIFIED, NOT INTRACTABLE, WITHOUT STATUS EPILEPTICUS: ICD-10-CM

## 2019-06-11 DIAGNOSIS — Z92.21 PERSONAL HISTORY OF ANTINEOPLASTIC CHEMOTHERAPY: ICD-10-CM

## 2019-06-11 DIAGNOSIS — M54.81 OCCIPITAL NEURALGIA: ICD-10-CM

## 2019-06-11 DIAGNOSIS — R25.1 TREMOR, UNSPECIFIED: ICD-10-CM

## 2019-06-11 DIAGNOSIS — R26.9 UNSPECIFIED ABNORMALITIES OF GAIT AND MOBILITY: ICD-10-CM

## 2019-06-11 DIAGNOSIS — G82.50 QUADRIPLEGIA, UNSPECIFIED: ICD-10-CM

## 2019-06-11 DIAGNOSIS — R00.0 TACHYCARDIA, UNSPECIFIED: ICD-10-CM

## 2019-06-11 DIAGNOSIS — I34.1 NONRHEUMATIC MITRAL (VALVE) PROLAPSE: ICD-10-CM

## 2019-06-11 DIAGNOSIS — R10.9 UNSPECIFIED ABDOMINAL PAIN: ICD-10-CM

## 2019-06-21 ENCOUNTER — EMERGENCY (EMERGENCY)
Facility: HOSPITAL | Age: 59
LOS: 1 days | Discharge: HOME | End: 2019-06-21
Attending: EMERGENCY MEDICINE | Admitting: EMERGENCY MEDICINE
Payer: MEDICARE

## 2019-06-21 VITALS
RESPIRATION RATE: 18 BRPM | OXYGEN SATURATION: 98 % | SYSTOLIC BLOOD PRESSURE: 108 MMHG | TEMPERATURE: 99 F | DIASTOLIC BLOOD PRESSURE: 77 MMHG | WEIGHT: 125 LBS | HEIGHT: 69 IN | HEART RATE: 128 BPM

## 2019-06-21 VITALS
HEART RATE: 104 BPM | TEMPERATURE: 98 F | RESPIRATION RATE: 18 BRPM | SYSTOLIC BLOOD PRESSURE: 115 MMHG | OXYGEN SATURATION: 95 % | DIASTOLIC BLOOD PRESSURE: 78 MMHG

## 2019-06-21 DIAGNOSIS — Z90.89 ACQUIRED ABSENCE OF OTHER ORGANS: Chronic | ICD-10-CM

## 2019-06-21 DIAGNOSIS — J98.4 OTHER DISORDERS OF LUNG: ICD-10-CM

## 2019-06-21 DIAGNOSIS — Z90.49 ACQUIRED ABSENCE OF OTHER SPECIFIED PARTS OF DIGESTIVE TRACT: Chronic | ICD-10-CM

## 2019-06-21 LAB
ALBUMIN SERPL ELPH-MCNC: 2.9 G/DL — LOW (ref 3.3–5)
ALP SERPL-CCNC: 76 U/L — SIGNIFICANT CHANGE UP (ref 40–120)
ALT FLD-CCNC: 11 U/L — LOW (ref 12–78)
ANION GAP SERPL CALC-SCNC: 10 MMOL/L — SIGNIFICANT CHANGE UP (ref 5–17)
AST SERPL-CCNC: 18 U/L — SIGNIFICANT CHANGE UP (ref 15–37)
BILIRUB SERPL-MCNC: 0.3 MG/DL — SIGNIFICANT CHANGE UP (ref 0.2–1.2)
BUN SERPL-MCNC: 21 MG/DL — SIGNIFICANT CHANGE UP (ref 7–23)
CALCIUM SERPL-MCNC: 9 MG/DL — SIGNIFICANT CHANGE UP (ref 8.5–10.1)
CHLORIDE SERPL-SCNC: 104 MMOL/L — SIGNIFICANT CHANGE UP (ref 96–108)
CO2 SERPL-SCNC: 25 MMOL/L — SIGNIFICANT CHANGE UP (ref 22–31)
CREAT SERPL-MCNC: 0.69 MG/DL — SIGNIFICANT CHANGE UP (ref 0.5–1.3)
D DIMER BLD IA.RAPID-MCNC: <150 NG/ML DDU — SIGNIFICANT CHANGE UP
GLUCOSE SERPL-MCNC: 112 MG/DL — HIGH (ref 70–99)
HCG SERPL-ACNC: 5 MIU/ML — SIGNIFICANT CHANGE UP
HCT VFR BLD CALC: 35.2 % — SIGNIFICANT CHANGE UP (ref 34.5–45)
HGB BLD-MCNC: 11.4 G/DL — LOW (ref 11.5–15.5)
INR BLD: 0.98 RATIO — SIGNIFICANT CHANGE UP (ref 0.88–1.16)
MCHC RBC-ENTMCNC: 29.2 PG — SIGNIFICANT CHANGE UP (ref 27–34)
MCHC RBC-ENTMCNC: 32.4 GM/DL — SIGNIFICANT CHANGE UP (ref 32–36)
MCV RBC AUTO: 90 FL — SIGNIFICANT CHANGE UP (ref 80–100)
NRBC # BLD: 0 /100 WBCS — SIGNIFICANT CHANGE UP (ref 0–0)
PLATELET # BLD AUTO: 456 K/UL — HIGH (ref 150–400)
POTASSIUM SERPL-MCNC: 4.2 MMOL/L — SIGNIFICANT CHANGE UP (ref 3.5–5.3)
POTASSIUM SERPL-SCNC: 4.2 MMOL/L — SIGNIFICANT CHANGE UP (ref 3.5–5.3)
PROT SERPL-MCNC: 7 G/DL — SIGNIFICANT CHANGE UP (ref 6–8.3)
PROTHROM AB SERPL-ACNC: 11.1 SEC — SIGNIFICANT CHANGE UP (ref 10–12.9)
RBC # BLD: 3.91 M/UL — SIGNIFICANT CHANGE UP (ref 3.8–5.2)
RBC # FLD: 15.5 % — HIGH (ref 10.3–14.5)
SODIUM SERPL-SCNC: 139 MMOL/L — SIGNIFICANT CHANGE UP (ref 135–145)
TROPONIN I SERPL-MCNC: <.015 NG/ML — SIGNIFICANT CHANGE UP (ref 0.01–0.04)
WBC # BLD: 9.44 K/UL — SIGNIFICANT CHANGE UP (ref 3.8–10.5)
WBC # FLD AUTO: 9.44 K/UL — SIGNIFICANT CHANGE UP (ref 3.8–10.5)

## 2019-06-21 PROCEDURE — 85379 FIBRIN DEGRADATION QUANT: CPT

## 2019-06-21 PROCEDURE — 99284 EMERGENCY DEPT VISIT MOD MDM: CPT | Mod: 25

## 2019-06-21 PROCEDURE — 36415 COLL VENOUS BLD VENIPUNCTURE: CPT

## 2019-06-21 PROCEDURE — 85610 PROTHROMBIN TIME: CPT

## 2019-06-21 PROCEDURE — 85027 COMPLETE CBC AUTOMATED: CPT

## 2019-06-21 PROCEDURE — 93005 ELECTROCARDIOGRAM TRACING: CPT

## 2019-06-21 PROCEDURE — 84702 CHORIONIC GONADOTROPIN TEST: CPT

## 2019-06-21 PROCEDURE — 99285 EMERGENCY DEPT VISIT HI MDM: CPT

## 2019-06-21 PROCEDURE — 93970 EXTREMITY STUDY: CPT | Mod: 26

## 2019-06-21 PROCEDURE — 80053 COMPREHEN METABOLIC PANEL: CPT

## 2019-06-21 PROCEDURE — 71045 X-RAY EXAM CHEST 1 VIEW: CPT

## 2019-06-21 PROCEDURE — 84484 ASSAY OF TROPONIN QUANT: CPT

## 2019-06-21 PROCEDURE — 71045 X-RAY EXAM CHEST 1 VIEW: CPT | Mod: 26

## 2019-06-21 PROCEDURE — 99284 EMERGENCY DEPT VISIT MOD MDM: CPT

## 2019-06-21 PROCEDURE — 93010 ELECTROCARDIOGRAM REPORT: CPT

## 2019-06-21 PROCEDURE — 93970 EXTREMITY STUDY: CPT

## 2019-06-21 NOTE — ED PROVIDER NOTE - SIGNIFICANT NEGATIVE FINDINGS
no headache, no Syncope , no palpitations, no abdominal pain, no n/v/d, no urinary symptoms, no bleeding. no neuro changes.

## 2019-06-21 NOTE — ED PROVIDER NOTE - CARE PROVIDERS DIRECT ADDRESSES
[email or web address]

## 2019-06-21 NOTE — CONSULT NOTE ADULT - SUBJECTIVE AND OBJECTIVE BOX
[facility] - Garrett Graves, Andi, Elida, Melvin, Filemon Haro  Office Number: [phone]    Initial Consult Note    CHIEF COMPLAINT: Patient is a 59y old  Female who presents with a chief complaint of back pain and SOB    HPI: 58yo F with PMH of interstitial lung disease on home O2 as needed, Pulmonary HTN, seizures, tachycardia, CVA presents with back pain and SOB. Pt states she started experiencing back pain last night. States she has SOB often and uses home O2 2L as needed for SOB. Pt noticed increased SOB when she was in pain. In the ED pt looks comfortable however c/o mild SOB at this time. Admits to DARNELL. Pt states she has positional tachycardia, sees Dr Ventura Cardio outpatient. Pt was started on Metoprolol 12.5 bid however does not take it due to low BP. Denies CP, anginal symptoms, dizziness, palpitations, orthopnea.      PAST MEDICAL & SURGICAL HISTORY:  Interstitial lung disease: on home o2 prn  NHL (non-Hodgkin's lymphoma): Agem 45 sp chemo/rt/stem cell  Transient cerebral ischemia, unspecified type  Mitral prolapse  History of tonsillectomy  History of appendectomy      SOCIAL HISTORY:  No tobacco, ethanol, or drug abuse.    FAMILY HISTORY:  Family history of stroke  Family history of breast cancer: Mother    No family history of acute MI or sudden cardiac death.    MEDICATIONS  (STANDING):    MEDICATIONS  (PRN):      Allergies    IV Contrast (Anaphylaxis)  shellfish. (Anaphylaxis)    Intolerances        REVIEW OF SYSTEMS:    CONSTITUTIONAL: No weakness, fevers or chills +back pain  EYES/ENT: No visual changes;  No vertigo or throat pain   NECK: No pain or stiffness  RESPIRATORY: No cough, wheezing, hemoptysis; +SOB  CARDIOVASCULAR: No chest pain or palpitations  GASTROINTESTINAL: No abdominal pain. No nausea, vomiting, or hematemesis; No diarrhea or constipation. No melena or hematochezia.  GENITOURINARY: No dysuria, frequency or hematuria  NEUROLOGICAL: No numbness or weakness  SKIN: No itching or rash  All other review of systems is negative unless indicated above    VITAL SIGNS:   Vital Signs Last 24 Hrs  T(C): 37 (21 Jun 2019 04:32), Max: 37 (21 Jun 2019 04:32)  T(F): 98.6 (21 Jun 2019 04:32), Max: 98.6 (21 Jun 2019 04:32)  HR: 103 (21 Jun 2019 05:39) (103 - 128)  BP: 108/77 (21 Jun 2019 04:32) (108/77 - 108/77)  BP(mean): --  RR: 18 (21 Jun 2019 04:32) (18 - 18)  SpO2: 98% (21 Jun 2019 04:32) (98% - 98%)    I&O's Summary      On Exam:    Constitutional: NAD, alert and oriented x 3, cachectic female  Lungs:  Non-labored, breath sounds are clear bilaterally, No wheezing, rales or rhonchi  Cardiovascular: RRR.  S1 and S2 positive.  No murmurs, rubs, gallops or clicks  Gastrointestinal: Bowel Sounds present, soft, nontender.   Ext: No peripheral edema.   Neurological: Alert, no focal deficits  Skin: No rashes or ulcers   Psych:  Mood & affect appropriate.    LABS: All Labs Reviewed:                        11.4   9.44  )-----------( 456      ( 21 Jun 2019 05:19 )             35.2     21 Jun 2019 05:19    139    |  104    |  21     ----------------------------<  112    4.2     |  25     |  0.69     Ca    9.0        21 Jun 2019 05:19    TPro  7.0    /  Alb  2.9    /  TBili  0.3    /  DBili  x      /  AST  18     /  ALT  11     /  AlkPhos  76     21 Jun 2019 05:19    PT/INR - ( 21 Jun 2019 05:19 )   PT: 11.1 sec;   INR: 0.98 ratio           CARDIAC MARKERS ( 21 Jun 2019 05:19 )  <.015 ng/mL / x     / x     / x     / x          Blood Culture:         RADIOLOGY:  < from: US Duplex Venous Lower Ext Complete, Bilateral (06.21.19 @ 05:39) >    EXAM:  US DPLX LWR EXT VEINS COMPL BI                            PROCEDURE DATE:  06/21/2019          INTERPRETATION:  BILATERAL LOWER EXTREMITY VENOUS ULTRASOUND    INDICATION:  Bilateral lower extremity edema.    TECHNIQUE: Grayscale, color and spectral Doppler evaluation of the   bilateral lower extremities with graded compression maneuvers was   performed.    COMPARISON: Bilateral lower extremity venous ultrasound 4/27/2019.    FINDINGS:     Normal waveforms and compressibility are demonstrated in the common   femoral, femoral, and popliteal veins of both lower extremities.  No   thrombus is demonstrated. Doppler examination shows normal spontaneous   and phasic flow.    No popliteal cysts or other significant findings are demonstrated.    There is limited visualization of the calf veins, but the visualized   segments of the proximal calf veins are patent.       IMPRESSION:      No evidence of DVT in either lower extremity allowing for the limitation   of the study.                  JUAN MANUEL HEREDIA M.D., ATTENDING RADIOLOGIST  This document has been electronically signed. Jun 21 2019  5:46AM                < end of copied text >      EKG:  Sinus tachycardia 101 bpm [facility] - Garrett Graves, Andi, Elida, Melvin, Filemon Haro  Office Number: [phone]    Initial Consult Note    CHIEF COMPLAINT: Patient is a 59y old  Female who presents with a chief complaint of back pain and SOB    HPI: 58yo F with PMH of interstitial lung disease on home O2 as needed, Pulmonary HTN, pneumothorax, seizures, tachycardia, CVA presents with back pain and SOB. Pt states she started experiencing back pain last night. States she has SOB often and uses home O2 2L as needed for SOB. Pt noticed increased SOB when she was in pain. In the ED pt looks comfortable however c/o mild SOB at this time. Admits to DARNELL. Pt states she has positional tachycardia, sees Dr Ventura Cardio outpatient. Pt was started on Metoprolol 12.5 bid however does not take it due to low BP. Denies CP, anginal symptoms, dizziness, palpitations, orthopnea.      PAST MEDICAL & SURGICAL HISTORY:  Interstitial lung disease: on home o2 prn  NHL (non-Hodgkin's lymphoma): Agem 45 sp chemo/rt/stem cell  Transient cerebral ischemia, unspecified type  Mitral prolapse  History of tonsillectomy  History of appendectomy      SOCIAL HISTORY:  No tobacco, ethanol, or drug abuse.    FAMILY HISTORY:  Family history of stroke  Family history of breast cancer: Mother    No family history of acute MI or sudden cardiac death.    MEDICATIONS  (STANDING):    MEDICATIONS  (PRN):      Allergies    IV Contrast (Anaphylaxis)  shellfish. (Anaphylaxis)    Intolerances        REVIEW OF SYSTEMS:    CONSTITUTIONAL: No weakness, fevers or chills +back pain  EYES/ENT: No visual changes;  No vertigo or throat pain   NECK: No pain or stiffness  RESPIRATORY: No cough, wheezing, hemoptysis; +SOB  CARDIOVASCULAR: No chest pain or palpitations  GASTROINTESTINAL: No abdominal pain. No nausea, vomiting, or hematemesis; No diarrhea or constipation. No melena or hematochezia.  GENITOURINARY: No dysuria, frequency or hematuria  NEUROLOGICAL: No numbness or weakness  SKIN: No itching or rash  All other review of systems is negative unless indicated above    VITAL SIGNS:   Vital Signs Last 24 Hrs  T(C): 37 (21 Jun 2019 04:32), Max: 37 (21 Jun 2019 04:32)  T(F): 98.6 (21 Jun 2019 04:32), Max: 98.6 (21 Jun 2019 04:32)  HR: 103 (21 Jun 2019 05:39) (103 - 128)  BP: 108/77 (21 Jun 2019 04:32) (108/77 - 108/77)  BP(mean): --  RR: 18 (21 Jun 2019 04:32) (18 - 18)  SpO2: 98% (21 Jun 2019 04:32) (98% - 98%)    I&O's Summary      On Exam:    Constitutional: NAD, alert and oriented x 3, cachectic female  Lungs:  Non-labored, breath sounds are clear bilaterally, No wheezing, rales or rhonchi  Cardiovascular: RRR.  S1 and S2 positive.  No murmurs, rubs, gallops or clicks  Gastrointestinal: Bowel Sounds present, soft, nontender.   Ext: No peripheral edema.   Neurological: Alert, no focal deficits  Skin: No rashes or ulcers   Psych:  Mood & affect appropriate.    LABS: All Labs Reviewed:                        11.4   9.44  )-----------( 456      ( 21 Jun 2019 05:19 )             35.2     21 Jun 2019 05:19    139    |  104    |  21     ----------------------------<  112    4.2     |  25     |  0.69     Ca    9.0        21 Jun 2019 05:19    TPro  7.0    /  Alb  2.9    /  TBili  0.3    /  DBili  x      /  AST  18     /  ALT  11     /  AlkPhos  76     21 Jun 2019 05:19    PT/INR - ( 21 Jun 2019 05:19 )   PT: 11.1 sec;   INR: 0.98 ratio           CARDIAC MARKERS ( 21 Jun 2019 05:19 )  <.015 ng/mL / x     / x     / x     / x          Blood Culture:         RADIOLOGY:  < from: US Duplex Venous Lower Ext Complete, Bilateral (06.21.19 @ 05:39) >    EXAM:  US DPLX LWR EXT VEINS COMPL BI                            PROCEDURE DATE:  06/21/2019          INTERPRETATION:  BILATERAL LOWER EXTREMITY VENOUS ULTRASOUND    INDICATION:  Bilateral lower extremity edema.    TECHNIQUE: Grayscale, color and spectral Doppler evaluation of the   bilateral lower extremities with graded compression maneuvers was   performed.    COMPARISON: Bilateral lower extremity venous ultrasound 4/27/2019.    FINDINGS:     Normal waveforms and compressibility are demonstrated in the common   femoral, femoral, and popliteal veins of both lower extremities.  No   thrombus is demonstrated. Doppler examination shows normal spontaneous   and phasic flow.    No popliteal cysts or other significant findings are demonstrated.    There is limited visualization of the calf veins, but the visualized   segments of the proximal calf veins are patent.       IMPRESSION:      No evidence of DVT in either lower extremity allowing for the limitation   of the study.                  JUAN MANUEL HEREDIA M.D., ATTENDING RADIOLOGIST  This document has been electronically signed. Jun 21 2019  5:46AM                < end of copied text >      EKG:  Sinus tachycardia 101 bpm

## 2019-06-21 NOTE — CONSULT NOTE ADULT - PROBLEM SELECTOR RECOMMENDATION 9
chronic lung disease - very long and complicated medical hx - see HPI  pt has PTX - from prior admission -   no resp distress, on room air - sat 96 pct at rest  cardio eval pending  dc plan ok from [facility] after official CXR report is available -   pt follows with Dr. Bettye Hazel in [facility] - for pulmonary medicine  EKG, LE dopplers, CXR and labs reviewed, official CXR report pending  pain regimen - cont with tylenol  may need I jesus  old records from [place] -

## 2019-06-21 NOTE — CONSULT NOTE ADULT - ASSESSMENT
58yo F with PMH of interstitial lung disease on home O2 as needed, Pulmonary HTN, seizures, tachycardia, CVA presents with back pain and SOB    - Sinus tachycardia likely 2/2 to chronic pulmonary disease and pulm HTN. Pt is not taking metoprolol as prescribed due to concern for hypotension. Would advise continuing low dose metoprolol 12.5 daily  - continue O2 prn for SOB, SOB 2/2 interstitial lung disease and pulm HTN not cardiac at this time  - No acute changes on EKG compared to previous, sinus tachycardia  - Previous ECHO in 04/2019 shows normal LV function with EF: 55%, with moderate pulm HTN, Mod-severe TR, Grade 1 diastolic dysfunction  - No concern for acute ischemia, trops negative x 1. pt asymptomatic  - No evidence of volume overload at this time  - BP well controlled, monitor routine hemodynamics  - monitor and replete lytes, keep K>4, Mg>2 60yo F with PMH of interstitial lung disease on home O2 as needed, Pulmonary HTN, pneumothorax, seizures, tachycardia, CVA presents with back pain and SOB    - Sinus tachycardia likely 2/2 to chronic pulmonary disease and pulm HTN. Pt is not taking metoprolol as prescribed due to concern for hypotension. Would advise continuing low dose metoprolol 12.5 daily  - continue O2 prn for SOB, SOB 2/2 interstitial lung disease and pulm HTN not cardiac at this time  - No acute changes on EKG compared to previous, sinus tachycardia  - Previous ECHO in 04/2019 shows normal LV function with EF: 55%, with moderate pulm HTN, Mod-severe TR, Grade 1 diastolic dysfunction  - No concern for acute ischemia, trops negative x 1. pt asymptomatic  - No evidence of volume overload at this time  - BP well controlled, monitor routine hemodynamics  - monitor and replete lytes, keep K>4, Mg>2 60yo F with PMH of interstitial lung disease on home O2 as needed, Pulmonary HTN, pneumothorax, seizures, tachycardia, CVA presents with back pain and SOB    - Sinus tachycardia likely 2/2 to chronic pulmonary disease and pulm HTN. Pt is not taking metoprolol as prescribed due to concern for hypotension. Would advise continuing low dose metoprolol 12.5 daily  - continue O2 prn for SOB, SOB 2/2 interstitial lung disease and pulm HTN not cardiac at this time  - No acute changes on EKG compared to previous, sinus tachycardia  - Previous ECHO in 04/2019 shows normal LV function with EF: 55%, with moderate pulm HTN, Mod-severe TR, Grade 1 diastolic dysfunction  - No concern for acute ischemia, trops negative x 1. pt asymptomatic.  I doubt back pain anginal  - No evidence of volume overload at this time  - BP well controlled, monitor routine hemodynamics  - monitor and replete lytes, keep K>4, Mg>2

## 2019-06-21 NOTE — ED PROVIDER NOTE - PROGRESS NOTE DETAILS
Pt seen by Dr Rashid, pt absolutely refusing admission to hospital. he states can dc home , outpt fu with her md. If Ptx on xr, is small, no chest tube would be needed. Pt awaiting cardio

## 2019-06-21 NOTE — ED ADULT NURSE NOTE - PMH
Interstitial lung disease  on home o2 prn  Mitral prolapse    NHL (non-Hodgkin's lymphoma)  Agem 45 sp chemo/rt/stem cell  Transient cerebral ischemia, unspecified type

## 2019-06-21 NOTE — ED PROVIDER NOTE - NSFOLLOWUPINSTRUCTIONS_ED_ALL_ED_FT
1)  Follow-up with your Primary Medical Doctor or referred doctor. Call today / next business day for prompt follow-up.  2) Follow-up with Cardiology as discussed. Call today / next business day for prompt follow-up and further workup and evaluation.  3) Return to Emergency room for any worsening or persistent pain, shortness of breath, weakness, fever, abdominal pain, dizziness, passing out, unexplained pain in arms, legs, back, any vomiting, feeling like your heart is racing,  or any other concerning symptoms.  4) See attached instruction sheets for additional information, including information regarding signs and symptoms to look out for, reasons to seek immediate care and other important instructions.   5) Follow-up with your pulmonologist, Dr Hazel, call today for prompt follow-up

## 2019-06-21 NOTE — CONSULT NOTE ADULT - SUBJECTIVE AND OBJECTIVE BOX
Date/Time Patient Seen:  		  Referring MD:   Data Reviewed	       Patient is a 59y old  Female who presents with a chief complaint of     Subjective/HPI    in bed  seen and examined  vs and meds reviewed  labs reviewed  recently discharged from [facility] acute rehab  alert, oriented, verbal, on room air,  at the bedside  old records reviewed    imaging and labs reviewed, cxr pending official report    Chief Complaint: difficulty breathing.    · Chief Complaint: The patient is a 59y Female complaining of difficulty breathing.  · HPI Objective Statement: 60 y/o WF h/o interstitial lung disease, pneumothorax, Seizure disorder, CVA residual left sided weakness.  C/O Right upper back pain, increased pain on inspiration, SOB,  onset this evening. No diaphoresis, no Radiation, no nausea, no vomiting, no fever, no chills, no acute stroke symptoms. The patient states that she started zonisamide one day ago as she begins to  august off of the valproic acid, no seizure activity, no stroke like symptoms.      Hospital Course	  58 yo female with PMHx of MVP, chronic SDH, interstitial lung disease/pneumothorax, pulmonary nodules, meniere's, non hodgkins lymphoma (SCD/XRT/chemo at [facility] [place]), TIA, tachycardia, occipital neuralgia, and neuropathy.  Patient was initially admitted to [place] on 3/13/19 with acute onset of b/l UE weakness associated with nausea, blurry vision, and HA. CT head showed chronic B/L frontal SDH. The rest of the workup was negative. CVA/TIA r/out.  Pt was discharged home but on 3/17 had apparent seizure.  She was admitted to [facility] and intubated and placed on depakote.  CTA H/N, MRI, EEG unremarkable.  ID Recommended LP for fever; family deferred.  Transferred to [facility] on 3/19/19.  MRI brain showed cerebral ischemia. The rest of the workup was negative, this including CSF studies. EEG neg seizures. (Evaluated by lymphoma specialist/onco, PET 4/5/19 r/o recurrence of lymphoma. Increased signal at base of tongue to follow up outpt ENT). Additionally, course complicated with pneumothorax, pulmonary consulted, no interventions, self resolved.  Tachycardia - per pulm, related related to chronic lung disease.  GI consulted for rectal pain; diagnosed c anal fissure/constipation, bowel regimen started. KUB 4/12/19 No stool, no free air, no distended loops of small bowel, fibroids. Refused further workup.   Other issues while admitted:  Hypotension - home diltiazem held per cardio  dizziness - sporadic, self resolving.  Pt deferred MRI.  May be 2/2 to hx of Meniere's disease.      *TTE 3/18/19 EF 60%, mod-severe MR.   MRI brain 3/29/19 cortically based restricted diffusion within R>L frontoparietal region as well as additional scattered small foci, may represent evolution of a recent ischemia vs improving encephalitis. Patient medically optimized and cleared for discharge to acute rehab at [facility] on 4/19/19.   At [facility] patient presented with rectal pain, fluctuating tachycardia 110-150 at rest and orthostatic hypotension. GI, hematology, cardiology and pulmonary evaluation requested. Rectal bleeding developed in the morning of 4/21/19 with severe hypotension and tachycardia. RRT called. Patient transferred to ICU. Patient's course in ICU reviewed and discussed with staff. Investigations, current lab results and recent treatments also reviewed and discussed. Patient was attempted to be evaluated by PT twice but unable to tolerate evaluation due to severe tachycardia up to 150 at rest and symptomatic orthostatic hypotension as low as 80s. Patient is preparing for colonoscopy today to evaluate the source of rectal bleed. S/p multiple PRBC transfusions. Presently on IV antibiotics and IV steroids.   Completed comprehensive rehab course at [facility], three disciplines PT/OT/SLP. Performs activities with min A. Tolerating regular diet. Patient evaluated by cardiology and GI. Metoprolol for tachycardia recommended. Bentyl for IBS related diarrhea. Leukocytosis evaluated by ID and septic workup negative to date.          PAST MEDICAL & SURGICAL HISTORY:  Interstitial lung disease: on home o2 prn  NHL (non-Hodgkin's lymphoma): Agem 45 sp chemo/rt/stem cell  Transient cerebral ischemia, unspecified type  Mitral prolapse  Pulmonary disease  History of tonsillectomy  History of appendectomy        Medication list         MEDICATIONS  (STANDING):    MEDICATIONS  (PRN):         Vitals log        ICU Vital Signs Last 24 Hrs  T(C): 37 (21 Jun 2019 04:32), Max: 37 (21 Jun 2019 04:32)  T(F): 98.6 (21 Jun 2019 04:32), Max: 98.6 (21 Jun 2019 04:32)  HR: 103 (21 Jun 2019 05:39) (103 - 128)  BP: 108/77 (21 Jun 2019 04:32) (108/77 - 108/77)  BP(mean): --  ABP: --  ABP(mean): --  RR: 18 (21 Jun 2019 04:32) (18 - 18)  SpO2: 98% (21 Jun 2019 04:32) (98% - 98%)           Input and Output:  I&O's Detail      Lab Data                        11.4   9.44  )-----------( 456      ( 21 Jun 2019 05:19 )             35.2     06-21    139  |  104  |  21  ----------------------------<  112<H>  4.2   |  25  |  0.69    Ca    9.0      21 Jun 2019 05:19    TPro  7.0  /  Alb  2.9<L>  /  TBili  0.3  /  DBili  x   /  AST  18  /  ALT  11<L>  /  AlkPhos  76  06-21      CARDIAC MARKERS ( 21 Jun 2019 05:19 )  <.015 ng/mL / x     / x     / x     / x        non smoker  non drinker    lives at home with children and         Review of Systems	    weak  back pain    Objective     Physical Examination    heart s1s2  lung dec BS  abd soft  on room air  sat 96 pct  no resp distress  extr no gross edema - weak  verbal  alert        Pertinent Lab findings & Imaging      Villatoro:  NO   Adequate UO     I&O's Detail           Discussed with:     Cultures:	        Radiology        EXAM:  US DPLX LWR EXT VEINS COMPL BI                            PROCEDURE DATE:  06/21/2019          INTERPRETATION:  BILATERAL LOWER EXTREMITY VENOUS ULTRASOUND    INDICATION:  Bilateral lower extremity edema.    TECHNIQUE: Grayscale, color and spectral Doppler evaluation of the   bilateral lower extremities with graded compression maneuvers was   performed.    COMPARISON: Bilateral lower extremity venous ultrasound 4/27/2019.    FINDINGS:     Normal waveforms and compressibility are demonstrated in the common   femoral, femoral, and popliteal veins of both lower extremities.  No   thrombus is demonstrated. Doppler examination shows normal spontaneous   and phasic flow.    No popliteal cysts or other significant findings are demonstrated.    There is limited visualization of the calf veins, but the visualized   segments of the proximal calf veins are patent.       IMPRESSION:      No evidence of DVT in either lower extremity allowing for the limitation   of the study.                  JUAN MANUEL HEREDIA M.D., ATTENDING RADIOLOGIST  This document has been electronically signed. Jun 21 2019  5:46AM

## 2019-06-21 NOTE — ED PROVIDER NOTE - OBJECTIVE STATEMENT
58 y/o WF h/o interstitial lung disease, pneumothorax, Seizure disorder, CVA residual left sided weakness.  C/O Right upper back pain, increased pain on inspiration, SOB,  onset this evening. No diaphoresis, no Radiation, no nausea, no vomiting, no fever, no chills, no acute stroke symptoms. The patient states that she started a new anti seizure medication one day ago as she begins to  wean off of valproic acid 58 y/o WF h/o interstitial lung disease, pneumothorax, Seizure disorder, CVA residual left sided weakness.  C/O Right upper back pain, increased pain on inspiration, SOB,  onset this evening. No diaphoresis, no Radiation, no nausea, no vomiting, no fever, no chills, no acute stroke symptoms. The patient states that she started zonisamide one day ago as she begins to  august off of the valproic acid, no seizure activity, no stroke like symptoms.

## 2019-06-21 NOTE — ED PROVIDER NOTE - PROVIDER TOKENS
PROVIDER:[TOKEN:[[id number]]],FREE:[LAST:[Ilir],FIRST:[Bettye],PHONE:[(   )    -],FAX:[(   )    -]]

## 2019-06-21 NOTE — ED ADULT NURSE NOTE - OBJECTIVE STATEMENT
patient a/o x 4 with a calm affect c/o right upper back pain/scapular pain.  patient breath sounds normal bilaterally.  EKG/XR  completed, pending US and CT

## 2019-09-06 ENCOUNTER — APPOINTMENT (OUTPATIENT)
Dept: NEUROLOGY | Facility: CLINIC | Age: 59
End: 2019-09-06
Payer: MEDICARE

## 2019-09-06 VITALS
DIASTOLIC BLOOD PRESSURE: 79 MMHG | HEART RATE: 80 BPM | OXYGEN SATURATION: 96 % | WEIGHT: 128 LBS | TEMPERATURE: 96.7 F | HEIGHT: 69 IN | SYSTOLIC BLOOD PRESSURE: 111 MMHG | BODY MASS INDEX: 18.96 KG/M2

## 2019-09-06 DIAGNOSIS — G40.109 LOCALIZATION-RELATED (FOCAL) (PARTIAL) SYMPTOMATIC EPILEPSY AND EPILEPTIC SYNDROMES WITH SIMPLE PARTIAL SEIZURES, NOT INTRACTABLE, W/OUT STATUS EPILEPTICUS: ICD-10-CM

## 2019-09-06 PROCEDURE — 99215 OFFICE O/P EST HI 40 MIN: CPT

## 2019-09-06 RX ORDER — DILTIAZEM HYDROCHLORIDE 120 MG/1
120 CAPSULE, COATED, EXTENDED RELEASE ORAL DAILY
Refills: 0 | Status: DISCONTINUED | COMMUNITY
End: 2019-09-06

## 2019-09-06 RX ORDER — SENNOSIDES 8.6 MG TABLETS 8.6 MG/1
8.6 TABLET ORAL
Qty: 10 | Refills: 2 | Status: DISCONTINUED | COMMUNITY
End: 2019-09-06

## 2019-09-06 RX ORDER — ASPIRIN ENTERIC COATED TABLETS 81 MG 81 MG/1
81 TABLET, DELAYED RELEASE ORAL DAILY
Refills: 0 | Status: DISCONTINUED | COMMUNITY
End: 2019-09-06

## 2019-09-06 RX ORDER — PROPRANOLOL HYDROCHLORIDE 20 MG/1
20 TABLET ORAL
Qty: 90 | Refills: 0 | Status: ACTIVE | COMMUNITY
Start: 2019-08-08

## 2019-09-06 RX ORDER — LEVALBUTEROL TARTRATE 45 UG/1
45 AEROSOL, METERED ORAL
Refills: 0 | Status: DISCONTINUED | COMMUNITY
End: 2019-09-06

## 2019-09-06 RX ORDER — GABAPENTIN 100 MG/1
100 CAPSULE ORAL
Refills: 0 | Status: DISCONTINUED | COMMUNITY
End: 2019-09-06

## 2019-09-06 RX ORDER — OXCARBAZEPINE 150 MG/1
150 TABLET, FILM COATED ORAL
Qty: 120 | Refills: 0 | Status: ACTIVE | COMMUNITY
Start: 2019-06-25

## 2019-09-06 RX ORDER — PANTOPRAZOLE 40 MG/1
40 TABLET, DELAYED RELEASE ORAL
Qty: 30 | Refills: 0 | Status: ACTIVE | COMMUNITY
Start: 2019-07-05

## 2019-09-06 RX ORDER — CEFUROXIME AXETIL 500 MG/1
500 TABLET ORAL
Refills: 0 | Status: DISCONTINUED | COMMUNITY
End: 2019-09-06

## 2019-09-06 RX ORDER — TIOTROPIUM BROMIDE INHALATION SPRAY 3.12 UG/1
2.5 SPRAY, METERED RESPIRATORY (INHALATION)
Refills: 0 | Status: DISCONTINUED | COMMUNITY
End: 2019-09-06

## 2019-09-06 RX ORDER — DOCUSATE SODIUM 100 MG/1
100 CAPSULE ORAL 3 TIMES DAILY
Qty: 60 | Refills: 1 | Status: DISCONTINUED | COMMUNITY
End: 2019-09-06

## 2019-09-06 RX ORDER — UMECLIDINIUM 62.5 UG/1
62.5 AEROSOL, POWDER ORAL
Qty: 30 | Refills: 0 | Status: ACTIVE | COMMUNITY
Start: 2019-08-28

## 2019-09-06 NOTE — PHYSICAL EXAM
[FreeTextEntry1] : Constitutional: no apparent distress\par Psychiatric: normal affect, euthyhmic, alert and oriented x 3\par HEENT: moist mucous membranes, oropharynx clear\par Neck: supple, no lymphadenopathy\par Respiratory: clear to auscultation bilaterally, no crackles or wheezing\par Cardiovascular: regular rate and rhythm, normal S1/S2, no murmurs, no edema\par GI: soft, non-tender, no distended, bowel sounds present; no hepatosplenomegaly on palpation\par Musculoskeletal: extremities warm, well-perfused, normal range of motion\par Skin: no rashes, bruises, or lesions\par \par Neurologic Exam:\par Mental Status: awake and alert, oriented to time, place, and person, attention intact, speech fluent and prosodic with no paraphasic errors, repetition intact, follows simple and complex commands, normal fund of knowledge\par Cranial Nerves: I: deferred; II: pupils equal round and reactive, visual fields full to confrontation; III, IV, VI: extraocular movements full with no nystagmus; V: facial sensation intact and symmetric; VII: facial power symmetric; VIII: hearing intact to finger rub; IX/X: palate elevates symmetrically, no dysarthria; XI: shoulder shrug symmetric; XII: tongue protrudes midline\par Motor: decreased bulk throughout, increased tone in the right arm, no orbiting or pronator drift, strength 5/5 in right upper and lower extremity, able to lift left arm antigravity, flex and extend left elbow antigravity, unable to extend fingers on left hand; able to extend left hip, flex and extend left knee antigravity\par Sensation: intact to light touch in distal upper and lower extremities bilaterally\par Coordination: finger-nose-finger intact bilaterally\par Reflexes: 2+ biceps, triceps, brachioradialis, patella; brisker on L\par Gait: able to walk without assistance, wide base, short stride length, minimal arm swing

## 2019-09-06 NOTE — REASON FOR VISIT
[Spouse] : spouse [Initial Evaluation] : an initial evaluation [Other: _____] : [unfilled] [FreeTextEntry1] : seizures

## 2019-09-06 NOTE — HISTORY OF PRESENT ILLNESS
[FreeTextEntry1] : 59-year-old woman with extremely complicated past medical history who presents for initial evaluation for second opinion for management of seizures.\par \par Ms. Kwong has a history of non-Hodgkin's lymphoma (s/p stem cell transplant, chemo, radiation, intrathecal methotrexate. currently in remission), stable interstitial lung disease (on intermittent home O2), recurrent pneumonia/prior pneumothorax, possible TIA in 2014 (speech impairment, now resolved, MRI showed bilateral FLAIR change thought to be 2/2 prior methotrexate treatment), spontaneous bilateral subdural hematomas (now resolved).  \par \par In March 2019 she had an episode of lost consciousness during which her whole body became floppy and she was foaming at the mouth.  Eyes were closed, no stiffening, shaking, tongue biting, incontinence.  She was brought to [facility], intubated for airway protection, transferred to [facility].  CT/CTA reportedly negative.  EEG showed slowing (unclear whether focal or generalized), but was started on Keppra which was later switched to VPA.  MRI showed R > L diffuse restriction in the fusiform gyri that was read as low suspicion for stroke though not clear why (possibly due to lack of ADC drop out).  LP was performed and cytology reportedly negative for lymphoma.  Patient and  report today that they spoke to her oncologist from her prior NHL treatment and he said the neurologic episode was unlikely to be related.    PET showed L base of tongue lesion and cervical lymph node that were biopsied but results pending at time of discharge and patient unsure what they were.  She was advised to follow up with neuro onc but has not done so.\par \par Over the course of her hospital and rehab stay she went from diffusely weak to weak in the left arm > left leg and from unable to walk to able to walk with assistance.  Right now she reports that her left arm > left leg are still weaker than they were in March, but continue to improve with physical therapy.  Her fingers on the left arm remain "curled in" and she is unable to completely straighten them.  She often wakes up in the middle of the night with severe stiffening/cramping of the left arm.  She is now able to walk short distances without assistance.  She was taking  mg BID but her [facility] neurologist Dr. Kulkarni is tapering her off of it due to hair loss and tremors.  Currently taking  qAM (level 22 on 8/21) and  mg BID (level 13.1) on 8/21.  No side effects so far from [facility].

## 2019-09-06 NOTE — DISCUSSION/SUMMARY
[FreeTextEntry1] : 59-year-old woman with many medical problems who had an episode of lost consciousness with decreased postural tone, eyes closed, and no typical seizure-like features including opened/deviated eyes, rhythmic shaking or stiffening, incontinence, or tongue biting.  \par \par This episode does not sound to me like a clearly epileptic seizure.  The fact that she was weak on the left (arm > leg) and has been gradually regaining function over the past 6 months and now has increased tone on that side is much more suggestive of stroke as the initial diagnosis (perhaps with seizure at the time of the stroke as this does happen in some cases).  The episodes of cramping in her left arm could represent post-stroke spasticity or focal motor seizures; recommend ambulatory EEG to evaluate further.  Will continue current AEDs for now pending EEG results.  \par \par If this was stroke, the underlying etiology remains unclear but has been worked up extensively at [facility] and [facility].  Will obtain outside records prior to repeating stroke work up.\par \par I do not think this is likely to be CNS lymphoma given the lack of progession over the past 6 months.\par \par # Probable stroke with residual left-sided weakness, possibly complicated by focal motor seizures\par -Routine and ambulatory EEG\par -Continue  mg BID and  mg in AM for now\par -Will obtain records of outside imaging and stroke work up\par \par I spent 60 minutes on this encounter of which >50% was spent counseling the patient, her , and her daughter about the differential diagnosis (stroke, seizure, or both), the fact that we may never know what the initial episode in March was, and my recommendations for further testing (EEG).

## 2019-09-26 ENCOUNTER — APPOINTMENT (OUTPATIENT)
Dept: WOUND CARE | Facility: HOSPITAL | Age: 59
End: 2019-09-26
Payer: MEDICARE

## 2019-09-26 ENCOUNTER — OUTPATIENT (OUTPATIENT)
Dept: OUTPATIENT SERVICES | Facility: HOSPITAL | Age: 59
LOS: 1 days | Discharge: HOME | End: 2019-09-26
Payer: MEDICARE

## 2019-09-26 VITALS
SYSTOLIC BLOOD PRESSURE: 112 MMHG | DIASTOLIC BLOOD PRESSURE: 72 MMHG | HEIGHT: 69 IN | BODY MASS INDEX: 18.81 KG/M2 | RESPIRATION RATE: 18 BRPM | TEMPERATURE: 97 F | OXYGEN SATURATION: 97 % | WEIGHT: 127 LBS | HEART RATE: 85 BPM

## 2019-09-26 DIAGNOSIS — R29.898 OTHER SYMPTOMS AND SIGNS INVOLVING THE MUSCULOSKELETAL SYSTEM: ICD-10-CM

## 2019-09-26 DIAGNOSIS — L89.300 PRESSURE ULCER OF UNSPECIFIED BUTTOCK, UNSTAGEABLE: ICD-10-CM

## 2019-09-26 DIAGNOSIS — Z90.49 ACQUIRED ABSENCE OF OTHER SPECIFIED PARTS OF DIGESTIVE TRACT: Chronic | ICD-10-CM

## 2019-09-26 DIAGNOSIS — Z82.3 FAMILY HISTORY OF STROKE: ICD-10-CM

## 2019-09-26 DIAGNOSIS — Z90.89 ACQUIRED ABSENCE OF OTHER ORGANS: Chronic | ICD-10-CM

## 2019-09-26 PROCEDURE — 99204 OFFICE O/P NEW MOD 45 MIN: CPT

## 2019-09-26 PROCEDURE — 99214 OFFICE O/P EST MOD 30 MIN: CPT

## 2019-09-26 PROCEDURE — G0463: CPT

## 2019-09-26 NOTE — HISTORY OF PRESENT ILLNESS
[FreeTextEntry1] : The wound is located on patient's buttocks area.  Patient reports that she was in [facility] for 3 months in Spring 2019 for tx of possible stroke with symptoms of left arm weakness.  Patient reports that while she was in the hospital she developed wound and became aware of it when discharged home in 6/2019.  Patient saw her PCP, Dr. Joselito Olsen ([place]) and he prescribed a medicated pad and referred patient to [facility]. Patient was tx by [facility] and recently discharged from skilled nurse home care.\par

## 2019-09-26 NOTE — REVIEW OF SYSTEMS
[Eyesight Problems] : eyesight problems [Cough] : cough [Skin Wound] : skin wound [Convulsions] : convulsions [Limb Weakness] : limb weakness [Difficulty Walking] : difficulty walking [Easy Bruising] : a tendency for easy bruising [Negative] : Endocrine [de-identified] : Possible CVA

## 2019-09-26 NOTE — PHYSICAL EXAM
[Normal Heart Sounds] : normal heart sounds [Normal Breath Sounds] : Normal breath sounds [Normal Rate and Rhythm] : normal rate and rhythm [Alert] : alert [Oriented to Person] : oriented to person [Oriented to Place] : oriented to place [Oriented to Time] : oriented to time [Calm] : calm [JVD] : no jugular venous distention  [Abdomen Tenderness] : ~T ~M No abdominal tenderness [Purpura] : no purpura  [Petechiae] : no petechiae [Skin Ulcer] : no ulcer [Skin Induration] : no induration [de-identified] : WDthinWF in NAD [de-identified] : Clear and supple. [de-identified] : Clear. Eyeglasses in place [de-identified] : Small ulcer site to the right of the coccyx. There is no opening and no drainage at this time. New skin present overlying the site. There is no induration and no sign of infection [de-identified] : Weakness left arm. Left hand and wrist in a brace. [FreeTextEntry1] : Coccyx - Stage 1 [FreeTextEntry3] : 0.1 [FreeTextEntry2] : 0.3 [de-identified] : mild [FreeTextEntry4] : peeling skin [de-identified] : Isaak Grubbs [TWNoteComboBox4] : None [de-identified] : NSC [de-identified] : Erythema

## 2019-09-26 NOTE — ASSESSMENT
[Verbal] : Verbal [Demo] : Demo [Handout] : Handout [Patient] : Patient [Family member] : Family member [Good - alert, interested, motivated] : Good - alert, interested, motivated [Verbalizes knowledge/Understanding] : Verbalizes knowledge/understanding [Dressing changes] : dressing changes [Skin Care] : skin care [Signs and symptoms of infection] : sign and symptoms of infection [How and When to Call] : how and when to call [Pain Management] : pain management [Off-loading] : off-loading [Patient responsibility to plan of care] : patient responsibility to plan of care [] : Yes [Stable] : stable [Home] : Home [Wheelchair] : Wheelchair [Not Applicable - Long Term Care/Home Health Agency] : Long Term Care/Home Health Agency: Not Applicable [FreeTextEntry2] : Promote optimal skin integrity, offloading, infection prevention\par  [FreeTextEntry4] : Dr. Olsen\par F/U 2 weeks

## 2019-09-26 NOTE — VITALS
[Pain related to present condition?] : The patient's  pain is related to present condition. [] : Yes [Sharp] : sharp [FreeTextEntry1] : Tylenol and changing position [FreeTextEntry2] : sitting [FreeTextEntry4] : C

## 2019-09-26 NOTE — PLAN
[FreeTextEntry1] : Allevyn Border dressing\par Patient urged to not sleep on her back but to sleep on her side.\par Return two weeks.

## 2019-09-28 DIAGNOSIS — Z79.899 OTHER LONG TERM (CURRENT) DRUG THERAPY: ICD-10-CM

## 2019-09-28 DIAGNOSIS — J84.9 INTERSTITIAL PULMONARY DISEASE, UNSPECIFIED: ICD-10-CM

## 2019-09-28 DIAGNOSIS — Z90.49 ACQUIRED ABSENCE OF OTHER SPECIFIED PARTS OF DIGESTIVE TRACT: ICD-10-CM

## 2019-09-28 DIAGNOSIS — G40.109 LOCALIZATION-RELATED (FOCAL) (PARTIAL) SYMPTOMATIC EPILEPSY AND EPILEPTIC SYNDROMES WITH SIMPLE PARTIAL SEIZURES, NOT INTRACTABLE, WITHOUT STATUS EPILEPTICUS: ICD-10-CM

## 2019-09-28 DIAGNOSIS — Z91.041 RADIOGRAPHIC DYE ALLERGY STATUS: ICD-10-CM

## 2019-09-28 DIAGNOSIS — R53.1 WEAKNESS: ICD-10-CM

## 2019-09-28 DIAGNOSIS — Z87.820 PERSONAL HISTORY OF TRAUMATIC BRAIN INJURY: ICD-10-CM

## 2019-09-28 DIAGNOSIS — Z91.013 ALLERGY TO SEAFOOD: ICD-10-CM

## 2019-09-28 DIAGNOSIS — L89.151 PRESSURE ULCER OF SACRAL REGION, STAGE 1: ICD-10-CM

## 2019-09-28 DIAGNOSIS — Z87.891 PERSONAL HISTORY OF NICOTINE DEPENDENCE: ICD-10-CM

## 2019-10-18 ENCOUNTER — APPOINTMENT (OUTPATIENT)
Dept: OBGYN | Facility: HOSPITAL | Age: 59
End: 2019-10-18
Payer: MEDICARE

## 2019-10-18 ENCOUNTER — OUTPATIENT (OUTPATIENT)
Dept: OUTPATIENT SERVICES | Facility: HOSPITAL | Age: 59
LOS: 1 days | Discharge: HOME | End: 2019-10-18
Payer: MEDICARE

## 2019-10-18 VITALS
RESPIRATION RATE: 18 BRPM | BODY MASS INDEX: 18.81 KG/M2 | SYSTOLIC BLOOD PRESSURE: 110 MMHG | WEIGHT: 127 LBS | DIASTOLIC BLOOD PRESSURE: 74 MMHG | HEART RATE: 90 BPM | OXYGEN SATURATION: 97 % | TEMPERATURE: 98.1 F | HEIGHT: 69 IN

## 2019-10-18 DIAGNOSIS — Z90.49 ACQUIRED ABSENCE OF OTHER SPECIFIED PARTS OF DIGESTIVE TRACT: Chronic | ICD-10-CM

## 2019-10-18 DIAGNOSIS — L97.301 NON-PRESSURE CHRONIC ULCER OF UNSPECIFIED ANKLE LIMITED TO BREAKDOWN OF SKIN: ICD-10-CM

## 2019-10-18 DIAGNOSIS — Z90.89 ACQUIRED ABSENCE OF OTHER ORGANS: Chronic | ICD-10-CM

## 2019-10-18 PROCEDURE — 99214 OFFICE O/P EST MOD 30 MIN: CPT

## 2019-10-18 PROCEDURE — G0463: CPT

## 2019-10-18 NOTE — HISTORY OF PRESENT ILLNESS
[FreeTextEntry1] : 58 yo  WF, here with her  for f/u of stage 1 pressure ulcer involving her coccyx area. Pt now also has a very small stage 3 right buttock press. ulcer. Encouraged pt to change position/frequent rotation throughout the day/night. Pt is s/p CVA several mos ago and unable to stand/ambulate. Also instructed pt to use her arms to push up and lift herself off of the wheelchair off and on.

## 2019-10-18 NOTE — REVIEW OF SYSTEMS
[Eyesight Problems] : eyesight problems [Cough] : cough [Skin Wound] : skin wound [Convulsions] : convulsions [Difficulty Walking] : difficulty walking [Limb Weakness] : limb weakness [Easy Bruising] : a tendency for easy bruising [Negative] : Psychiatric [de-identified] : Possible CVA

## 2019-10-18 NOTE — PHYSICAL EXAM
[Normal Thyroid] : the thyroid was normal [Normal Breath Sounds] : Normal breath sounds [Normal Heart Sounds] : normal heart sounds [Normal Rate and Rhythm] : normal rate and rhythm [Oriented to Person] : oriented to person [Alert] : alert [Oriented to Place] : oriented to place [Oriented to Time] : oriented to time [Calm] : calm [JVD] : no jugular venous distention  [Tender] : nontender [Abdomen Masses] : No abdominal massess [Abdomen Tenderness] : ~T ~M No abdominal tenderness [Enlarged] : not enlarged [de-identified] : adult WF, NAD, WD, WN, alert, Ox3 [de-identified] : skin intact [FreeTextEntry1] : Coccyx   [FreeTextEntry2] : 0.3 [de-identified] : FUNMILAYO, Isaak Border [FreeTextEntry4] : 0.1 [FreeTextEntry3] : 0.1 [de-identified] : mild [FreeTextEntry9] : 0.2 [FreeTextEntry7] : Buttocks excoriation  [de-identified] : partial epid/dermis loss [FreeTextEntry8] : 0.3 [TWNoteComboBox1] : Midline [de-identified] : 0.1 [de-identified] : FUNMILAYO, Allevyn border [de-identified] : No [TWNoteComboBox5] : No [TWNoteComboBox4] : None [TWNoteComboBox6] : Pressure [de-identified] : 100% [de-identified] : Erythema [de-identified] : None [de-identified] : None [de-identified] : No [de-identified] : None [TWNoteComboBox9] : Right [de-identified] : No [de-identified] : None [de-identified] : No [de-identified] : Pressure [de-identified] : Normal [de-identified] : 3x Weekly [de-identified] : 100% [de-identified] : None

## 2019-10-18 NOTE — ASSESSMENT
[Demo] : Demo [Verbal] : Verbal [Family member] : Family member [Handout] : Handout [Patient] : Patient [Verbalizes knowledge/Understanding] : Verbalizes knowledge/understanding [Good - alert, interested, motivated] : Good - alert, interested, motivated [Dressing changes] : dressing changes [Pressure relief] : pressure relief [Skin Care] : skin care [Signs and symptoms of infection] : sign and symptoms of infection [Pain Management] : pain management [How and When to Call] : how and when to call [Patient responsibility to plan of care] : patient responsibility to plan of care [Off-loading] : off-loading [Stable] : stable [Home] : Home [Wheelchair] : Wheelchair [Not Applicable - Long Term Care/Home Health Agency] : Long Term Care/Home Health Agency: Not Applicable [] : No [FreeTextEntry2] : infection prevention\par Promote optimal skin integrity, \par offloading, \par Nutriton/ Weight management \par  [FreeTextEntry4] : Pt and family member educated on turning frequently and pressure relief techniques. \par No s/s of infection \par Follow up two weeks ( Pt preference)

## 2019-10-18 NOTE — VITALS
[Dull] : dull [Pain related to present condition?] : The patient's  pain is related to present condition. [FreeTextEntry3] : Coccyx [de-identified] : 2/10 [FreeTextEntry1] : Tylenol [] : No [FreeTextEntry4] : off-loading, Tylenol [FreeTextEntry2] : pressure

## 2019-10-20 DIAGNOSIS — Z91.013 ALLERGY TO SEAFOOD: ICD-10-CM

## 2019-10-20 DIAGNOSIS — Z80.3 FAMILY HISTORY OF MALIGNANT NEOPLASM OF BREAST: ICD-10-CM

## 2019-10-20 DIAGNOSIS — Z87.891 PERSONAL HISTORY OF NICOTINE DEPENDENCE: ICD-10-CM

## 2019-10-20 DIAGNOSIS — Z91.041 RADIOGRAPHIC DYE ALLERGY STATUS: ICD-10-CM

## 2019-10-20 DIAGNOSIS — Z87.820 PERSONAL HISTORY OF TRAUMATIC BRAIN INJURY: ICD-10-CM

## 2019-10-20 DIAGNOSIS — Z90.49 ACQUIRED ABSENCE OF OTHER SPECIFIED PARTS OF DIGESTIVE TRACT: ICD-10-CM

## 2019-10-20 DIAGNOSIS — Z82.3 FAMILY HISTORY OF STROKE: ICD-10-CM

## 2019-10-20 DIAGNOSIS — Z85.72 PERSONAL HISTORY OF NON-HODGKIN LYMPHOMAS: ICD-10-CM

## 2019-10-20 DIAGNOSIS — L89.312 PRESSURE ULCER OF RIGHT BUTTOCK, STAGE 2: ICD-10-CM

## 2019-10-20 DIAGNOSIS — J84.9 INTERSTITIAL PULMONARY DISEASE, UNSPECIFIED: ICD-10-CM

## 2019-10-20 DIAGNOSIS — L89.151 PRESSURE ULCER OF SACRAL REGION, STAGE 1: ICD-10-CM

## 2019-10-20 DIAGNOSIS — G40.109 LOCALIZATION-RELATED (FOCAL) (PARTIAL) SYMPTOMATIC EPILEPSY AND EPILEPTIC SYNDROMES WITH SIMPLE PARTIAL SEIZURES, NOT INTRACTABLE, WITHOUT STATUS EPILEPTICUS: ICD-10-CM

## 2019-10-20 DIAGNOSIS — Z79.899 OTHER LONG TERM (CURRENT) DRUG THERAPY: ICD-10-CM

## 2019-10-25 ENCOUNTER — APPOINTMENT (OUTPATIENT)
Dept: NEUROLOGY | Facility: CLINIC | Age: 59
End: 2019-10-25
Payer: MEDICARE

## 2019-10-25 PROCEDURE — 95816 EEG AWAKE AND DROWSY: CPT

## 2019-10-26 PROCEDURE — 95953: CPT

## 2019-11-08 ENCOUNTER — APPOINTMENT (OUTPATIENT)
Dept: OBGYN | Facility: HOSPITAL | Age: 59
End: 2019-11-08

## 2019-12-06 ENCOUNTER — OTHER (OUTPATIENT)
Age: 59
End: 2019-12-06

## 2019-12-26 ENCOUNTER — OUTPATIENT (OUTPATIENT)
Dept: OUTPATIENT SERVICES | Facility: HOSPITAL | Age: 59
LOS: 1 days | Discharge: HOME | End: 2019-12-26
Payer: MEDICARE

## 2019-12-26 ENCOUNTER — INPATIENT (INPATIENT)
Facility: HOSPITAL | Age: 59
LOS: 28 days | Discharge: ANOTHER HOSPITAL | DRG: 166 | End: 2020-01-24
Attending: FAMILY MEDICINE | Admitting: HOSPITALIST
Payer: MEDICARE

## 2019-12-26 ENCOUNTER — APPOINTMENT (OUTPATIENT)
Dept: SURGERY | Facility: HOSPITAL | Age: 59
End: 2019-12-26
Payer: MEDICARE

## 2019-12-26 VITALS
RESPIRATION RATE: 16 BRPM | HEIGHT: 69 IN | WEIGHT: 124 LBS | TEMPERATURE: 97 F | DIASTOLIC BLOOD PRESSURE: 74 MMHG | OXYGEN SATURATION: 94 % | SYSTOLIC BLOOD PRESSURE: 125 MMHG | BODY MASS INDEX: 18.37 KG/M2 | HEART RATE: 91 BPM

## 2019-12-26 VITALS
HEART RATE: 97 BPM | DIASTOLIC BLOOD PRESSURE: 80 MMHG | RESPIRATION RATE: 15 BRPM | HEIGHT: 69 IN | WEIGHT: 119.93 LBS | SYSTOLIC BLOOD PRESSURE: 114 MMHG | TEMPERATURE: 98 F | OXYGEN SATURATION: 92 %

## 2019-12-26 DIAGNOSIS — L89.312 PRESSURE ULCER OF RIGHT BUTTOCK, STAGE 2: ICD-10-CM

## 2019-12-26 DIAGNOSIS — Z90.49 ACQUIRED ABSENCE OF OTHER SPECIFIED PARTS OF DIGESTIVE TRACT: Chronic | ICD-10-CM

## 2019-12-26 DIAGNOSIS — G40.909 EPILEPSY, UNSPECIFIED, NOT INTRACTABLE, WITHOUT STATUS EPILEPTICUS: ICD-10-CM

## 2019-12-26 DIAGNOSIS — R00.0 TACHYCARDIA, UNSPECIFIED: ICD-10-CM

## 2019-12-26 DIAGNOSIS — R11.0 NAUSEA: ICD-10-CM

## 2019-12-26 DIAGNOSIS — Z90.89 ACQUIRED ABSENCE OF OTHER ORGANS: Chronic | ICD-10-CM

## 2019-12-26 DIAGNOSIS — K21.9 GASTRO-ESOPHAGEAL REFLUX DISEASE WITHOUT ESOPHAGITIS: ICD-10-CM

## 2019-12-26 DIAGNOSIS — K52.9 NONINFECTIVE GASTROENTERITIS AND COLITIS, UNSPECIFIED: ICD-10-CM

## 2019-12-26 DIAGNOSIS — J93.9 PNEUMOTHORAX, UNSPECIFIED: ICD-10-CM

## 2019-12-26 DIAGNOSIS — Z29.9 ENCOUNTER FOR PROPHYLACTIC MEASURES, UNSPECIFIED: ICD-10-CM

## 2019-12-26 DIAGNOSIS — L89.311 PRESSURE ULCER OF RIGHT BUTTOCK, STAGE 1: ICD-10-CM

## 2019-12-26 DIAGNOSIS — N39.0 URINARY TRACT INFECTION, SITE NOT SPECIFIED: ICD-10-CM

## 2019-12-26 DIAGNOSIS — L89.151 PRESSURE ULCER OF SACRAL REGION, STAGE 1: ICD-10-CM

## 2019-12-26 LAB
ALBUMIN SERPL ELPH-MCNC: 2.8 G/DL — LOW (ref 3.3–5)
ALP SERPL-CCNC: 126 U/L — HIGH (ref 40–120)
ALT FLD-CCNC: 34 U/L — SIGNIFICANT CHANGE UP (ref 12–78)
ANION GAP SERPL CALC-SCNC: 7 MMOL/L — SIGNIFICANT CHANGE UP (ref 5–17)
APPEARANCE UR: ABNORMAL
AST SERPL-CCNC: 32 U/L — SIGNIFICANT CHANGE UP (ref 15–37)
BASOPHILS # BLD AUTO: 0.04 K/UL — SIGNIFICANT CHANGE UP (ref 0–0.2)
BASOPHILS NFR BLD AUTO: 0.4 % — SIGNIFICANT CHANGE UP (ref 0–2)
BILIRUB SERPL-MCNC: 0.3 MG/DL — SIGNIFICANT CHANGE UP (ref 0.2–1.2)
BILIRUB UR-MCNC: ABNORMAL
BUN SERPL-MCNC: 15 MG/DL — SIGNIFICANT CHANGE UP (ref 7–23)
CALCIUM SERPL-MCNC: 9.2 MG/DL — SIGNIFICANT CHANGE UP (ref 8.5–10.1)
CHLORIDE SERPL-SCNC: 104 MMOL/L — SIGNIFICANT CHANGE UP (ref 96–108)
CO2 SERPL-SCNC: 31 MMOL/L — SIGNIFICANT CHANGE UP (ref 22–31)
COLOR SPEC: YELLOW — SIGNIFICANT CHANGE UP
CREAT SERPL-MCNC: 0.56 MG/DL — SIGNIFICANT CHANGE UP (ref 0.5–1.3)
DIFF PNL FLD: ABNORMAL
EOSINOPHIL # BLD AUTO: 0.17 K/UL — SIGNIFICANT CHANGE UP (ref 0–0.5)
EOSINOPHIL NFR BLD AUTO: 1.5 % — SIGNIFICANT CHANGE UP (ref 0–6)
FLU A RESULT: SIGNIFICANT CHANGE UP
FLU A RESULT: SIGNIFICANT CHANGE UP
FLUAV AG NPH QL: SIGNIFICANT CHANGE UP
FLUBV AG NPH QL: SIGNIFICANT CHANGE UP
GLUCOSE SERPL-MCNC: 116 MG/DL — HIGH (ref 70–99)
GLUCOSE UR QL: NEGATIVE — SIGNIFICANT CHANGE UP
HCT VFR BLD CALC: 39.5 % — SIGNIFICANT CHANGE UP (ref 34.5–45)
HGB BLD-MCNC: 12.9 G/DL — SIGNIFICANT CHANGE UP (ref 11.5–15.5)
IMM GRANULOCYTES NFR BLD AUTO: 0.5 % — SIGNIFICANT CHANGE UP (ref 0–1.5)
KETONES UR-MCNC: ABNORMAL
LACTATE SERPL-SCNC: 0.7 MMOL/L — SIGNIFICANT CHANGE UP (ref 0.7–2)
LEUKOCYTE ESTERASE UR-ACNC: ABNORMAL
LIDOCAIN IGE QN: 93 U/L — SIGNIFICANT CHANGE UP (ref 73–393)
LYMPHOCYTES # BLD AUTO: 1.14 K/UL — SIGNIFICANT CHANGE UP (ref 1–3.3)
LYMPHOCYTES # BLD AUTO: 10.3 % — LOW (ref 13–44)
MCHC RBC-ENTMCNC: 28.8 PG — SIGNIFICANT CHANGE UP (ref 27–34)
MCHC RBC-ENTMCNC: 32.7 GM/DL — SIGNIFICANT CHANGE UP (ref 32–36)
MCV RBC AUTO: 88.2 FL — SIGNIFICANT CHANGE UP (ref 80–100)
MONOCYTES # BLD AUTO: 0.96 K/UL — HIGH (ref 0–0.9)
MONOCYTES NFR BLD AUTO: 8.6 % — SIGNIFICANT CHANGE UP (ref 2–14)
NEUTROPHILS # BLD AUTO: 8.74 K/UL — HIGH (ref 1.8–7.4)
NEUTROPHILS NFR BLD AUTO: 78.7 % — HIGH (ref 43–77)
NITRITE UR-MCNC: NEGATIVE — SIGNIFICANT CHANGE UP
NRBC # BLD: 0 /100 WBCS — SIGNIFICANT CHANGE UP (ref 0–0)
PH UR: 5 — SIGNIFICANT CHANGE UP (ref 5–8)
PLATELET # BLD AUTO: 484 K/UL — HIGH (ref 150–400)
POTASSIUM SERPL-MCNC: 3.5 MMOL/L — SIGNIFICANT CHANGE UP (ref 3.5–5.3)
POTASSIUM SERPL-SCNC: 3.5 MMOL/L — SIGNIFICANT CHANGE UP (ref 3.5–5.3)
PROT SERPL-MCNC: 6.1 G/DL — SIGNIFICANT CHANGE UP (ref 6–8.3)
PROT UR-MCNC: 75 MG/DL
RBC # BLD: 4.48 M/UL — SIGNIFICANT CHANGE UP (ref 3.8–5.2)
RBC # FLD: 14.8 % — HIGH (ref 10.3–14.5)
RSV RESULT: SIGNIFICANT CHANGE UP
RSV RNA RESP QL NAA+PROBE: SIGNIFICANT CHANGE UP
SODIUM SERPL-SCNC: 142 MMOL/L — SIGNIFICANT CHANGE UP (ref 135–145)
SP GR SPEC: 1.02 — SIGNIFICANT CHANGE UP (ref 1.01–1.02)
UROBILINOGEN FLD QL: 4
WBC # BLD: 11.1 K/UL — HIGH (ref 3.8–10.5)
WBC # FLD AUTO: 11.1 K/UL — HIGH (ref 3.8–10.5)

## 2019-12-26 PROCEDURE — 74176 CT ABD & PELVIS W/O CONTRAST: CPT | Mod: 26

## 2019-12-26 PROCEDURE — 99214 OFFICE O/P EST MOD 30 MIN: CPT

## 2019-12-26 PROCEDURE — 71045 X-RAY EXAM CHEST 1 VIEW: CPT | Mod: 26

## 2019-12-26 PROCEDURE — G0463: CPT

## 2019-12-26 PROCEDURE — 99283 EMERGENCY DEPT VISIT LOW MDM: CPT

## 2019-12-26 PROCEDURE — 71250 CT THORAX DX C-: CPT | Mod: 26

## 2019-12-26 PROCEDURE — 93010 ELECTROCARDIOGRAM REPORT: CPT

## 2019-12-26 PROCEDURE — 99223 1ST HOSP IP/OBS HIGH 75: CPT | Mod: GC

## 2019-12-26 RX ORDER — PANTOPRAZOLE SODIUM 20 MG/1
40 TABLET, DELAYED RELEASE ORAL
Refills: 0 | Status: DISCONTINUED | OUTPATIENT
Start: 2019-12-26 | End: 2020-01-15

## 2019-12-26 RX ORDER — METRONIDAZOLE 500 MG
500 TABLET ORAL ONCE
Refills: 0 | Status: COMPLETED | OUTPATIENT
Start: 2019-12-26 | End: 2019-12-26

## 2019-12-26 RX ORDER — ZONISAMIDE 100 MG
1 CAPSULE ORAL
Qty: 0 | Refills: 0 | DISCHARGE

## 2019-12-26 RX ORDER — OXCARBAZEPINE 300 MG/1
300 TABLET, FILM COATED ORAL ONCE
Refills: 0 | Status: COMPLETED | OUTPATIENT
Start: 2019-12-26 | End: 2019-12-26

## 2019-12-26 RX ORDER — ENOXAPARIN SODIUM 100 MG/ML
40 INJECTION SUBCUTANEOUS DAILY
Refills: 0 | Status: DISCONTINUED | OUTPATIENT
Start: 2019-12-26 | End: 2020-01-24

## 2019-12-26 RX ORDER — SODIUM CHLORIDE 9 MG/ML
1000 INJECTION INTRAMUSCULAR; INTRAVENOUS; SUBCUTANEOUS ONCE
Refills: 0 | Status: COMPLETED | OUTPATIENT
Start: 2019-12-26 | End: 2019-12-26

## 2019-12-26 RX ORDER — TIOTROPIUM BROMIDE 18 UG/1
1 CAPSULE ORAL; RESPIRATORY (INHALATION) DAILY
Refills: 0 | Status: DISCONTINUED | OUTPATIENT
Start: 2019-12-26 | End: 2020-01-24

## 2019-12-26 RX ORDER — SODIUM CHLORIDE 9 MG/ML
1000 INJECTION INTRAMUSCULAR; INTRAVENOUS; SUBCUTANEOUS
Refills: 0 | Status: DISCONTINUED | OUTPATIENT
Start: 2019-12-26 | End: 2019-12-27

## 2019-12-26 RX ORDER — SENNA PLUS 8.6 MG/1
2 TABLET ORAL AT BEDTIME
Refills: 0 | Status: DISCONTINUED | OUTPATIENT
Start: 2019-12-26 | End: 2020-01-05

## 2019-12-26 RX ORDER — OXCARBAZEPINE 300 MG/1
300 TABLET, FILM COATED ORAL
Refills: 0 | Status: DISCONTINUED | OUTPATIENT
Start: 2019-12-26 | End: 2020-01-24

## 2019-12-26 RX ORDER — CEFTRIAXONE 500 MG/1
1000 INJECTION, POWDER, FOR SOLUTION INTRAMUSCULAR; INTRAVENOUS EVERY 24 HOURS
Refills: 0 | Status: DISCONTINUED | OUTPATIENT
Start: 2019-12-27 | End: 2019-12-30

## 2019-12-26 RX ORDER — CEFTRIAXONE 500 MG/1
1000 INJECTION, POWDER, FOR SOLUTION INTRAMUSCULAR; INTRAVENOUS ONCE
Refills: 0 | Status: COMPLETED | OUTPATIENT
Start: 2019-12-26 | End: 2019-12-26

## 2019-12-26 RX ORDER — LACTOBACILLUS ACIDOPHILUS 100MM CELL
1 CAPSULE ORAL
Refills: 0 | Status: DISCONTINUED | OUTPATIENT
Start: 2019-12-26 | End: 2020-01-24

## 2019-12-26 RX ORDER — ONDANSETRON 4 MG/1
4 TABLET ORAL
Refills: 0 | Status: ACTIVE | COMMUNITY
Start: 2019-12-26

## 2019-12-26 RX ORDER — UMECLIDINIUM 62.5 UG/1
62.5 AEROSOL, POWDER ORAL
Refills: 0 | Status: ACTIVE | COMMUNITY
Start: 2019-12-26

## 2019-12-26 RX ORDER — ONDANSETRON 8 MG/1
4 TABLET, FILM COATED ORAL ONCE
Refills: 0 | Status: COMPLETED | OUTPATIENT
Start: 2019-12-26 | End: 2019-12-26

## 2019-12-26 RX ORDER — METRONIDAZOLE 500 MG
500 TABLET ORAL EVERY 8 HOURS
Refills: 0 | Status: DISCONTINUED | OUTPATIENT
Start: 2019-12-27 | End: 2019-12-27

## 2019-12-26 RX ORDER — ONDANSETRON 8 MG/1
4 TABLET, FILM COATED ORAL EVERY 8 HOURS
Refills: 0 | Status: DISCONTINUED | OUTPATIENT
Start: 2019-12-26 | End: 2020-01-07

## 2019-12-26 RX ORDER — DIVALPROEX SODIUM 125 MG/1
125 TABLET, DELAYED RELEASE ORAL
Qty: 30 | Refills: 0 | Status: DISCONTINUED | COMMUNITY
Start: 2019-08-21 | End: 2019-12-26

## 2019-12-26 RX ADMIN — ONDANSETRON 4 MILLIGRAM(S): 8 TABLET, FILM COATED ORAL at 16:41

## 2019-12-26 RX ADMIN — CEFTRIAXONE 100 MILLIGRAM(S): 500 INJECTION, POWDER, FOR SOLUTION INTRAMUSCULAR; INTRAVENOUS at 18:47

## 2019-12-26 RX ADMIN — OXCARBAZEPINE 300 MILLIGRAM(S): 300 TABLET, FILM COATED ORAL at 22:24

## 2019-12-26 RX ADMIN — SODIUM CHLORIDE 75 MILLILITER(S): 9 INJECTION INTRAMUSCULAR; INTRAVENOUS; SUBCUTANEOUS at 22:33

## 2019-12-26 RX ADMIN — SODIUM CHLORIDE 1000 MILLILITER(S): 9 INJECTION INTRAMUSCULAR; INTRAVENOUS; SUBCUTANEOUS at 17:44

## 2019-12-26 RX ADMIN — Medication 100 MILLIGRAM(S): at 21:04

## 2019-12-26 NOTE — H&P ADULT - NSHPSOCIALHISTORY_GEN_ALL_CORE
Marital Status:   Occupation: retired, worked in sales  Lives with: daughter and     Needs assistance with ADLs, ambulates with assistance. Mainly wheelchair bound, but able to stand and walk short distances    Tobacco Usage: former smoker 1 ppd for 10 years, quit 30+ years ago  Alcohol Usage: denies     Advanced Directives: HCP -  Shashi

## 2019-12-26 NOTE — H&P ADULT - PROBLEM SELECTOR PLAN 6
chronic  -Continue Oxcarbazepine BID  -Follow up Oxcarbazepine level  -Per , patient sometimes "appears altered - starts yelling in her sleep and hallucinating when she received her night dose" Patient started this medication 2-3 months ago

## 2019-12-26 NOTE — H&P ADULT - PROBLEM SELECTOR PLAN 1
CT chest: Moderate-sized right pneumothorax.   -Admit to tele  -Patient Spo2 98% on 2L NC, able to speak full sentences, will continue to monitor  -PulDr. Gay perez, evaluated patient in the ED  -Continue Spiriva (home medication Incruse Ellipta)

## 2019-12-26 NOTE — PHYSICAL EXAM
[Normal Breath Sounds] : Normal breath sounds [Normal Heart Sounds] : normal heart sounds [Oriented to Person] : oriented to person [Calm] : calm [Alert] : alert [JVD] : no jugular venous distention  [de-identified] : WNL [de-identified] : WD/WN in no acute distress. [de-identified] : Sacral and buttock ulcers are small, stage 1, dry, no drainage, no infection. [de-identified] : WNL [FreeTextEntry1] : Coccyx- closed  [FreeTextEntry7] : Buttocks Small excoriations less than 1cm2  [de-identified] : FUNMILAYO, Isaak border [de-identified] : NSC, Lotrisone [de-identified] : FUNMILAYO, Allevyn border  [de-identified] : Anus- circumferential excoriations  [de-identified] : Pt presented with dizziness,Nausea, Loose bowels and anorexia MD notified and recommended pt be admitted to ED  [TWNoteComboBox1] : Midline [de-identified] : 3x Weekly [de-identified] : Secondary Dressing [de-identified] : Midline [TWNoteComboBox9] : Right [de-identified] : 3x Weekly [de-identified] : Secondary Dressing [de-identified] : Daily [de-identified] : Secondary Dressing

## 2019-12-26 NOTE — VITALS
[Sharp] : sharp [Pain related to present condition?] : The patient's  pain is related to present condition. [Aching] : aching [] : No [FreeTextEntry3] : Sacrum/ Buttocks  [de-identified] : 5/10 [FreeTextEntry1] : Tylenol [FreeTextEntry2] : Pressure/ Touch  [FreeTextEntry4] : Dressing changed as ordered.

## 2019-12-26 NOTE — CONSULT NOTE ADULT - SUBJECTIVE AND OBJECTIVE BOX
Patient is a 59y old  Female who presents with a chief complaint of     BRIEF HOSPITAL COURSE: 60 y/o female with pmhx of ILD (prior smoker), ptx, seizure/CVA with residual left sided deficits presents with nausea and diarrhea x 2 weeks as well as foul smelling urine found to have stercocolitis and UTI with incidental finding of worsening right sided pneuomothorax since prior CT on . ICU consulted for worsening pneumothorax. Patient is asymptomatic and satting 92% on room air and 98% on 2 L NC with BP of 107/33. She is being admitted for UTI, stercocolitis and monitoring of ptx.    PAST MEDICAL & SURGICAL HISTORY:  Interstitial lung disease: on home o2 prn  NHL (non-Hodgkin's lymphoma): Agem 45 sp chemo/rt/stem cell  Transient cerebral ischemia, unspecified type  Mitral prolapse  History of tonsillectomy  History of appendectomy    Allergies    IV Contrast (Anaphylaxis)  shellfish. (Anaphylaxis)    Intolerances      FAMILY HISTORY:  Family history of stroke  Family history of breast cancer: Mother      Family history otherwise noncontributory.      Review of Systems:  CONSTITUTIONAL: No fever, chills, or fatigue  EYES: No eye pain, visual disturbances, or discharge  ENMT:  No difficulty hearing, tinnitus, vertigo; No sinus or throat pain  NECK: No pain or stiffness  RESPIRATORY: No cough, wheezing, chills or hemoptysis; No shortness of breath  CARDIOVASCULAR: No chest pain, palpitations, dizziness, or leg swelling  GASTROINTESTINAL: No abdominal or epigastric pain. No nausea, vomiting, or hematemesis; No diarrhea or constipation. No melena or hematochezia.  GENITOURINARY: +dysuria, frequency, or incontinence. no hematuria  NEUROLOGICAL: No headaches, memory loss, loss of strength, numbness, or tremors  SKIN: No itching, burning, rashes, or lesions   MUSCULOSKELETAL: No joint pain or swelling; No muscle, back, or extremity pain  PSYCHIATRIC: No depression, anxiety, mood swings, or difficulty sleeping    All other review of systems negative except as mentioned in HPI.      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      Medications:    propranolol 20 milliGRAM(s) Oral three times a day  propranolol 20 milliGRAM(s) Oral once      OXcarbazepine 300 milliGRAM(s) Oral once  OXcarbazepine 300 milliGRAM(s) Oral two times a day        pantoprazole    Tablet 40 milliGRAM(s) Oral before breakfast          ICU Vital Signs Last 24 Hrs  T(C): 37.2 (26 Dec 2019 19:30), Max: 37.2 (26 Dec 2019 19:30)  T(F): 98.9 (26 Dec 2019 19:30), Max: 98.9 (26 Dec 2019 19:30)  HR: 88 (26 Dec 2019 19:30) (88 - 97)  BP: 107/73 (26 Dec 2019 19:30) (107/73 - 114/80)  BP(mean): --  ABP: --  ABP(mean): --  RR: 18 (26 Dec 2019 19:30) (15 - 18)  SpO2: 98% (26 Dec 2019 19:30) (92% - 98%)    Vital Signs Last 24 Hrs  T(C): 37.2 (26 Dec 2019 19:30), Max: 37.2 (26 Dec 2019 19:30)  T(F): 98.9 (26 Dec 2019 19:30), Max: 98.9 (26 Dec 2019 19:30)  HR: 88 (26 Dec 2019 19:30) (88 - 97)  BP: 107/73 (26 Dec 2019 19:30) (107/73 - 114/80)  BP(mean): --  RR: 18 (26 Dec 2019 19:30) (15 - 18)  SpO2: 98% (26 Dec 2019 19:30) (92% - 98%)        I&O's Detail        LABS:                        12.9   11.10 )-----------( 484      ( 26 Dec 2019 17:00 )             39.5     12    142  |  104  |  15  ----------------------------<  116<H>  3.5   |  31  |  0.56    Ca    9.2      26 Dec 2019 17:00    TPro  6.1  /  Alb  2.8<L>  /  TBili  0.3  /  DBili  x   /  AST  32  /  ALT  34  /  AlkPhos  126<H>  12-          CAPILLARY BLOOD GLUCOSE          Urinalysis Basic - ( 26 Dec 2019 17:21 )    Color: Yellow / Appearance: Turbid / S.020 / pH: x  Gluc: x / Ketone: Trace  / Bili: Moderate / Urobili: 4   Blood: x / Protein: 75 mg/dL / Nitrite: Negative   Leuk Esterase: Moderate / RBC: 6-10 /HPF / WBC 26-50   Sq Epi: x / Non Sq Epi: Moderate / Bacteria: Moderate      CULTURES:      Physical Examination:    GENERAL: No acute distress.      EYES: Pupils equal, reactive to light.  Symmetric.    EARS, NOSE, THROAT: Normal; supple neck, no lymphadenopathy; trachea midline    PULM: Clear to auscultation bilaterally, no significant sputum production. No use of accessory respiratory muscles.    CVS: Regular rate and rhythm, no murmurs, rubs, or gallops    GI: Soft, nondistended, nontender, normoactive bowel sounds, no masses, no guarding    EXTREMITIES: No edema. DP and radial pulses 2+ bilaterally.    SKIN: Warm and well perfused, no rashes noted.    NEURO: Alert, oriented, interactive, nonfocal    DEVICES: none    RADIOLOGY:     IMPRESSION:     Moderate-sized right pneumothorax.    Worsening groundglass opacities in both lower lobes, possibly infection.    Stercoral colitis.    Indeterminant hepatic lesions, possibly metastatic disease.                RICHIE BANDA M.D., ATTENDING RADIOLOGIST  This document has been electronically signed. Dec 26 2019  7:17PM

## 2019-12-26 NOTE — CONSULT NOTE ADULT - SUBJECTIVE AND OBJECTIVE BOX
Date/Time Patient Seen:  		  Referring MD:   Data Reviewed	       Patient is a 59y old  Female who presents with a chief complaint of     Subjective/HPI    in bed  seen and examined  vs and meds reviewed  labs reviewed  er provider note reviewed  imaging reviewed  old records reviewed  spoke with sister and pt and     HISTORY OF PRESENT ILLNESS:    International Travel:  International Travel within 21 days? No(1)     Preferred Language to Address Healthcare:  · Preferred Language to Address Healthcare	English     Patient Identity:  · Birth Sex	Female     Child Abuse Assessment (patients less than 13 yrs):  Chief Complaint: nausea, vomiting, diarrhea and weight loss.    · Chief Complaint: The patient is a 59y Female complaining of nausea, vomiting, diarrhea.  · HPI Objective Statement: 59 y female presents with nausea, diarrhea x 2 weeks, states her urine smells "strong",  states she was seen last week by her PMD Dr Lemon, was told her results were "fine", was recommended take immodium, kaopectate .  states she has hx of lung disease, nonsmoker, denies recent abx, no recent hospitalizations.    states she has a  pulmonary Dr Jermaine.  states she has a stroke 4 mos ago, with residual left side hemiplegia.  states she is not on blood thinners.  states she has a cardiologist at [facility] cannot recall name.  states she is on seizure meds.  · Presenting Symptoms: DIARRHEA, NAUSEA, chills, foul smelling urine  · Negative Findings: no abdominal distension, no blood in stool, no fever, no hematuria  · Location: nausea, diarrhea.  · Radiation: no radiation  · Timing: frequent  · Duration: week(s)  2  · Quality: nausea, diarrhea  · Severity: MODERATE  · Context: unknown  · Recent Exposure To: none known  · Aggravated Factors: bowel movement  · Relieving Factors: none      58 yo female with PMHx of MVP, chronic SDH, interstitial lung disease/pneumothorax, pulmonary nodules, meniere's, non hodgkins lymphoma (SCD/XRT/chemo at [facility] 2003), TIA, tachycardia, occipital neuralgia, and neuropathy.  Patient was initially admitted to [facility] on 3/13/19 with acute onset of b/l UE weakness associated with nausea, blurry vision, and HA. CT head showed chronic B/L frontal SDH. The rest of the workup was negative. CVA/TIA r/out.  Pt was discharged home but on 3/17 had apparent seizure.  She was admitted to [facility] and intubated and placed on depakote.  CTA H/N, MRI, EEG unremarkable.  ID Recommended LP for fever; family deferred.  Transferred to [facility] on 3/19/19.  MRI brain showed cerebral ischemia. The rest of the workup was negative, this including CSF studies. EEG neg seizures. (Evaluated by lymphoma specialist/onco, PET 4/5/19 r/o recurrence of lymphoma. Increased signal at base of tongue to follow up outpt ENT). Additionally, course complicated with pneumothorax, pulmonary consulted, no interventions, self resolved.  Tachycardia - per pulm, related related to chronic lung disease.  GI consulted for rectal pain; diagnosed c anal fissure/constipation, bowel regimen started. KUB 4/12/19 No stool, no free air, no distended loops of small bowel, fibroids. Refused further workup.   Other issues while admitted:  Hypotension - home diltiazem held per cardio  dizziness - sporadic, self resolving.  Pt deferred MRI.  May be 2/2 to hx of Meniere's disease.      *TTE 3/18/19 EF 60%, mod-severe MR.   MRI brain 3/29/19 cortically based restricted diffusion within R>L frontoparietal region as well as additional scattered small foci, may represent evolution of a recent ischemia vs improving encephalitis. Patient medically optimized and cleared for discharge to acute rehab at [facility] on 4/19/19.   At [facility] patient presented with rectal pain, fluctuating tachycardia 110-150 at rest and orthostatic hypotension. GI, hematology, cardiology and pulmonary evaluation requested. Rectal bleeding developed in the morning of 4/21/19 with severe hypotension and tachycardia. RRT called. Patient transferred to ICU. Patient's course in ICU reviewed and discussed with staff. Investigations, current lab results and recent treatments also reviewed and discussed. Patient was attempted to be evaluated by PT twice but unable to tolerate evaluation due to severe tachycardia up to 150 at rest and symptomatic orthostatic hypotension as low as 80s. Patient is preparing for colonoscopy today to evaluate the source of rectal bleed. S/p multiple PRBC transfusions. Presently on IV antibiotics and IV steroids.   Completed comprehensive rehab course at [facility], three disciplines PT/OT/SLP. Performs activities with min A. Tolerating regular diet. Patient evaluated by cardiology and GI. Metoprolol for tachycardia recommended. Bentyl for IBS related diarrhea. Leukocytosis evaluated by ID and septic workup negative to date.      PAST MEDICAL & SURGICAL HISTORY:  Interstitial lung disease: on home o2 prn  NHL (non-Hodgkin's lymphoma): Agem 45 sp chemo/rt/stem cell  Transient cerebral ischemia, unspecified type  Mitral prolapse  Pulmonary disease  History of tonsillectomy  History of appendectomy        Medication list         MEDICATIONS  (STANDING):    MEDICATIONS  (PRN):         Vitals log        ICU Vital Signs Last 24 Hrs  T(C): 36.4 (26 Dec 2019 14:30), Max: 36.4 (26 Dec 2019 14:30)  T(F): 97.6 (26 Dec 2019 14:30), Max: 97.6 (26 Dec 2019 14:30)  HR: 97 (26 Dec 2019 14:30) (97 - 97)  BP: 114/80 (26 Dec 2019 14:30) (114/80 - 114/80)  BP(mean): --  ABP: --  ABP(mean): --  RR: 15 (26 Dec 2019 14:30) (15 - 15)  SpO2: 92% (26 Dec 2019 14:30) (92% - 92%)           Input and Output:  I&O's Detail      Lab Data                        12.9   11.10 )-----------( 484      ( 26 Dec 2019 17:00 )             39.5     12-26    142  |  104  |  15  ----------------------------<  116<H>  3.5   |  31  |  0.56    Ca    9.2      26 Dec 2019 17:00    TPro  6.1  /  Alb  2.8<L>  /  TBili  0.3  /  DBili  x   /  AST  32  /  ALT  34  /  AlkPhos  126<H>  12-26        non smoker  non drinker    lives at home      Review of Systems	  weak  frail      Objective     Physical Examination    heart s1s2  lung dec BS  abd soft  sat 91 pct  on room air  verbal  frail  weak      Pertinent Lab findings & Imaging      Villatoro:  NO   Adequate UO     I&O's Detail           Discussed with:     Cultures:	        Radiology      EXAM:  XR CHEST PORTABLE URGENT 1V                            PROCEDURE DATE:  12/26/2019          INTERPRETATION:  Clinical information: Nausea.    Technique: Frontal view of the chest.    Comparison: Prior chest x-ray examination from 6/21/2019.    Findings: A right-sided pneumothorax has increased in size when compared to the prior exam. The trachea remains midline in position. Left apical pleural thickening and/or scarring appears unchanged. Additional small bilateral pleural effusions noted.    Scattered reticular opacities are notable throughout both lungs which may reflect scarring.    The heart size is at the upper limits of normal.    Multilevel degenerative changes are noted within the imaged potions of the spine.    IMPRESSION: Interval enlargement of a right-sided pneumothorax.    Small bilateral pleural effusions.    Chronic lung fibrotic changes.    Dr. Dudley Roy discussed findings with RAYSA Salazar on 12/26/2019 at 4:23 PM                DUDLEY ROY M.D., ATTENDING RADIOLOGIST  This document has been electronically signed. Dec 26 2019  4:23PM

## 2019-12-26 NOTE — H&P ADULT - PROBLEM SELECTOR PLAN 8
IMPROVE VTE Individual Risk Assessment          RISK                                                          Points  [  ] Previous VTE                                                3  [  ] Thrombophilia                                             2  [  x] Lower limb paralysis                                   2        (unable to hold up >15 seconds)    [  x] Current Cancer                                             2         (within 6 months)  [ x ] Immobilization > 24 hrs                              1  [  ] ICU/CCU stay > 24 hours                             1  [x  ] Age > 60                                                         1    IMPROVE VTE Score: 6  DVT prophylaxis: Lovenox SQ  Fall risk

## 2019-12-26 NOTE — PATIENT PROFILE ADULT - STATED REASON FOR ADMISSION
Frequency of urination, burning, since 3 days, difficulty breathing since 1 week ,diorrhea, nausea,  loss of appetite ,pain in stomach since 3 weeks, vomiting, diarrhea

## 2019-12-26 NOTE — ASSESSMENT
[Verbal] : Verbal [Demo] : Demo [Patient] : Patient [Handout] : Handout [Family member] : Family member [Good - alert, interested, motivated] : Good - alert, interested, motivated [Verbalizes knowledge/Understanding] : Verbalizes knowledge/understanding [Dressing changes] : dressing changes [Pressure relief] : pressure relief [Skin Care] : skin care [How and When to Call] : how and when to call [Signs and symptoms of infection] : sign and symptoms of infection [Pain Management] : pain management [Off-loading] : off-loading [Patient responsibility to plan of care] : patient responsibility to plan of care [] : Yes [Stable] : stable [Wheelchair] : Wheelchair [Not Applicable - Long Term Care/Home Health Agency] : Long Term Care/Home Health Agency: Not Applicable [Emergency Room] : Emergency Room [FreeTextEntry4] : Pt and family member educated on turning frequently and pressure relief techniques. PT and family member verbalized understanding\par Pt presented with dizziness, Loose bowels and anorexia MD notified and recommended pt be admitted to ED \par Pt to follow up to [facility] once discharged from ED \par \par  [FreeTextEntry2] : infection prevention\par Promote optimal skin integrity, \par Offloading/ Pressure relief  \par Nutriton/ Weight management \par  [FreeTextEntry1] : Stage 1 sacral ulcer, stable, generalized weakness and anorexia.

## 2019-12-26 NOTE — ED PROVIDER NOTE - RELIEVING FACTORS
[facility] Nurse  Consult Note       NAME:  Jamshid Maldonado  MEDICAL RECORD NUMBER:  [id number]  AGE: 34 y.o. GENDER: female  : 1980  TODAY'S DATE:  2019    Subjective:     Reason for Educator consult ---  Evaluation and Assessment:    Jamshid Maldonado is a 34 y.o. female referred by:   [] Physician  [x] Nursing  [] Other:      Patient states diagnosed with type 1 diabetes 17 years ago. Patient has not seen a doctor in 10 years. Patient largely utilizes [employer] groups that give away extra insulin pens. If unable to obtain insulin through friends, patient will utilizes [employer] insulin. Patient aware to never go without insulin. Patient checks blood sugar at least 2x a day, usually 4. Per patient, she has plenty of glucometer supplies and will not need any upon discharge. Patient educated on A1C of 10.1. \"oh my gosh that's so high for me. I really need to get control of that. \"  Previously, before loss of insurance, patient was prescribed 10-15 units of Lantus and 5-6 units of Humalog. Pt is aware of S/S of hyper- and hypoglycemia and the proper treatment of hyper- and hypoglycemia. Patient given booklet of written material regarding diabetes titled \"Where Do I Begin? \"       PAST MEDICAL HISTORY      Diagnosis Date    Diabetes mellitus ([address]        PAST SURGICAL HISTORY  Past Surgical History:   Procedure Laterality Date     SECTION         FAMILY HISTORY  History reviewed. No pertinent family history. SOCIAL HISTORY  Social History     Tobacco Use    Smoking status: Current Every Day Smoker     Packs/day: 0.50     Types: Cigarettes    Smokeless tobacco: Never Used   Substance Use Topics    Alcohol use: Yes     Comment: occasional    Drug use: No       ALLERGIES  Allergies   Allergen Reactions    Lisinopril Other (See Comments)     angioedema       MEDICATIONS  No current facility-administered medications on file prior to encounter.       Current Outpatient Medications on File Prior to Encounter   Medication Sig Dispense Refill    Insulin Detemir (LEVEMIR SC) Inject into the skin         Objective:     LABS    CBC:   Lab Results   Component Value Date    WBC 31.3 05/21/2019    RBC 4.05 05/21/2019    HGB 13.7 05/21/2019    HCT 44.3 05/21/2019    .4 05/21/2019    MCH 33.8 05/21/2019    MCHC 30.9 05/21/2019    RDW 13.9 05/21/2019     05/21/2019    MPV 8.1 05/21/2019     CMP:    Lab Results   Component Value Date     05/21/2019    K 5.1 05/21/2019    K 6.3 05/21/2019    K 6.3 05/21/2019     05/21/2019    CO2 6 05/21/2019    BUN 20 05/21/2019    CREATININE 0.8 05/21/2019    GFRAA >60 05/21/2019    AGRATIO 1.3 05/20/2019    LABGLOM >60 05/21/2019    GLUCOSE 157 05/21/2019    PROT 7.5 05/20/2019    LABALBU 4.3 05/20/2019    CALCIUM 8.3 05/21/2019    BILITOT 1.3 05/20/2019    ALKPHOS 147 05/20/2019    AST 22 05/20/2019    ALT 15 05/20/2019       HgBA1c:    Lab Results   Component Value Date    LABA1C 10.1 05/20/2019       This patient's last creatinine was   Recent Labs     05/21/19  1304   CREATININE 0.8        Recent Blood sugars have been   Lab Results   Component Value Date    POCGLU 150 05/21/2019    POCGLU 127 05/21/2019    POCGLU 177 05/21/2019    POCGLU 253 05/21/2019    POCGLU 340 05/21/2019    POCGLU 397 05/21/2019    POCGLU 406 05/21/2019    POCGLU 231 05/20/2019     Lab Results   Component Value Date    GLUCOSE 157 05/21/2019    GLUCOSE 445 05/21/2019    GLUCOSE 436 05/21/2019    GLUCOSE 230 05/20/2019            Assessment:     Patient Active Problem List   Diagnosis    Pyelonephritis    DKA, type 1 [address] [address]    Hyperkalemia    Intractable nausea and vomiting    Leukocytosis    Sepsis ([facility] [address]       Plan:     Plan of Care:   Admit with pyelonephritis  Diabetes Type 1 uncontrolled, insulin resistant,   Would recommend continuing with current insulin/DKA orders at this time. Patient will require prescription assistance at discharge. none

## 2019-12-26 NOTE — CONSULT NOTE ADULT - ASSESSMENT
58 y/o female with pmhx of ILD (prior smoker), ptx, seizure/CVA with residual left sided deficits presents with nausea and diarrhea x 2 weeks as well as foul smelling urine found to have stercocolitis and UTI with incidental finding of worsening right sided pneuomothorax since prior CT on october 28. Patient does not currently require ICU admission as she is satting 98% on 2 L NC, normotensive, and able to have extended conversations in full sentences without respiratory distress or desaturation. Explained to patient to notify staff if symptoms such as shortness of breath or pleuritic chest pain occur. Please reconsult if patients condition worsens.    PULM: maintain spo2 > 92%. moderate PTX with no hemodynamic compromise likely secondary to chronic idiopathic ILD (possibly related to prior chemo exposure). pulmonology following. may need pigtail placement but no urgent need at this time per pulm. please reconsult if patient develops any symptoms as with moderate ptx her condition may change, recommend  monitoring.    Discussed with eICU attending Dr. Zimmer. 58 y/o female with pmhx of ILD (prior smoker), ptx, seizure/CVA with residual left sided deficits presents with nausea and diarrhea x 2 weeks as well as foul smelling urine found to have stercocolitis and UTI with incidental finding of worsening right sided pneuomothorax since prior CT on october 28. Patient does not currently require ICU admission as she is satting 98% on 2 L NC, normotensive, and able to have extended conversations in full sentences without respiratory distress or desaturation. Explained to patient to notify staff if symptoms such as shortness of breath or pleuritic chest pain occur. Please reconsult if patients condition worsens.    PULM: maintain spo2 > 92%. moderate PTX with no hemodynamic compromise likely secondary to chronic idiopathic ILD (possibly related to prior chemo exposure). pulmonology following. may need pigtail placement but no urgent need at this time per pulm. please reconsult if patient develops any symptoms as with moderate ptx her condition may change, recommend  monitoring.    Discussed with eICU attending Dr. Delong.

## 2019-12-26 NOTE — ED ADULT NURSE REASSESSMENT NOTE - NS ED NURSE REASSESS COMMENT FT1
Received report from Kaleb LAUREN. Patient resting in bed on cardiac monitor. Family bedside. Safety and comfort measures provided and maintained.

## 2019-12-26 NOTE — H&P ADULT - PROBLEM SELECTOR PLAN 3
CT abd/pelvis: Large amount of stool within the rectum with surrounding inflammatory change. Stercoral colitis.  Likely overflow diarrhea   -s/p IV Flagyl in the ED. Continue IV Flagyl   -Start Senna at bedtime  -Bacid TID  -Consider Mesalamine suppository CT abd/pelvis: Large amount of stool within the rectum with surrounding inflammatory change. Stercoral colitis.  Likely overflow diarrhea   -s/p IV Flagyl in the ED. Continue IV Flagyl   -Start Senna at bedtime  -Bacid TID  -Consider Mesalamine suppository  GI consulted.

## 2019-12-26 NOTE — H&P ADULT - NSHPPHYSICALEXAM_GEN_ALL_CORE
Height (cm): 175.26 (12-26 @ 14:30)  Weight (kg): 54.4 (12-26 @ 14:30)  BMI (kg/m2): 17.7 (12-26 @ 14:30)  BSA (m2): 1.66 (12-26 @ 14:30)  Vital Signs Last 24 Hrs  T(C): 36.9 (26 Dec 2019 22:15), Max: 37.2 (26 Dec 2019 19:30)  T(F): 98.4 (26 Dec 2019 22:15), Max: 98.9 (26 Dec 2019 19:30)  HR: 100 (26 Dec 2019 22:15) (88 - 100)  BP: 133/67 (26 Dec 2019 22:15) (107/73 - 133/67)  BP(mean): --  RR: 18 (26 Dec 2019 22:15) (15 - 18)  SpO2: 95% (26 Dec 2019 22:15) (92% - 98%)  [ ] room air   [X ] 02 - 2L NC    PHYSICAL EXAM:  GENERAL: chronically ill appearing female in NAD  HEAD:  AT/NC  ENMT: EOMI, PERRL  NECK:  supple  NERVOUS SYSTEM:  Alert & Oriented X3, grossly moves all extremities, residual left sided weakness, unable to use L hand  CHEST/LUNG: diminished breath sounds at R upper lobe, no wheezing  HEART:  tachycardiac, No murmurs, rubs, or gallops  ABDOMEN:  soft, nontender, nondistended, positive bowel sounds  EXTREMITIES: No clubbing, cyanosis or edema  SKIN: no venous stasis skin changes Height (cm): 175.26 (12-26 @ 14:30)  Weight (kg): 54.4 (12-26 @ 14:30)  BMI (kg/m2): 17.7 (12-26 @ 14:30)  BSA (m2): 1.66 (12-26 @ 14:30)  Vital Signs Last 24 Hrs  T(C): 36.9 (26 Dec 2019 22:15), Max: 37.2 (26 Dec 2019 19:30)  T(F): 98.4 (26 Dec 2019 22:15), Max: 98.9 (26 Dec 2019 19:30)  HR: 100 (26 Dec 2019 22:15) (88 - 100)  BP: 133/67 (26 Dec 2019 22:15) (107/73 - 133/67)  BP(mean): --  RR: 18 (26 Dec 2019 22:15) (15 - 18)  SpO2: 95% (26 Dec 2019 22:15) (92% - 98%)  [ ] room air   [X ] 02 - 2L NC    PHYSICAL EXAM:  GENERAL: chronically ill appearing female in NAD  HEAD:  AT/NC  ENMT: EOMI, PERRL  NECK:  supple  NERVOUS SYSTEM:  Alert & Oriented X3, grossly moves all extremities, residual left sided weakness, unable to use L hand  CHEST/LUNG: diminished breath sounds at R upper lobe, no wheezing  HEART:  tachycardiac, No murmurs, rubs, or gallops  ABDOMEN:  soft, nontender, nondistended, positive bowel sounds  EXTREMITIES: No clubbing, cyanosis or edema  SKIN: no venous stasis skin changes  sacral ulcer

## 2019-12-26 NOTE — H&P ADULT - ASSESSMENT
59F with PMH of interstitial lung disease hx of pneumothorax (on 2L NC at home), hx pulmonary nodules, Meniere's disease, Non-Hodgkin's lymphoma (SCD/XRT/chemo at [facility] 2003), hx of CVA (3/2019 - residual L sided weakness, unable to use her L arm), not on ASA or Plavix due to GIB, Seizure disorder, tachycardia, MVP, hx of chronic SDH, presents with weakness, weight loss, nausea, and diarrhea admitted for R pneumothorax, UTI, colitis.

## 2019-12-26 NOTE — ED PROVIDER NOTE - CARE PLAN
Principal Discharge DX:	Pneumothorax on right Principal Discharge DX:	Pneumothorax on right  Secondary Diagnosis:	UTI (urinary tract infection)  Secondary Diagnosis:	Colitis

## 2019-12-26 NOTE — ED PROVIDER NOTE - PROGRESS NOTE DETAILS
call out to Dr Rashid , awaiting call back spoke with Dr Rashid, case discussed, will be in to see patient spoke with hospitalist Dr Ronquillo, will call me back spoke with hospitalist Dr Reddy, requested call Dr Rashid for further recommendations , call out to Dr Rashid awaiting call back spoke with Dr Rashid, results of ct scans discussed, current vitals, advised have ICU see patient, patient can be admitted to tele, spoke with Dr Reddy, will admit patient spoke with Dr Rashid, results of ct scans discussed, current vitals, advised have ICU see patient, spoke with ICU PA, case discussed will see patient, patient admitted to tele, spoke with Dr Reddy, will admit patient

## 2019-12-26 NOTE — H&P ADULT - NSHPREVIEWOFSYSTEMS_GEN_ALL_CORE
CONSTITUTIONAL: No fever, positive chills  EYES: No eye pain, visual disturbances, or discharge  ENMT:  No ear pain; No sinus or throat pain  NECK: No pain, No stiffness  RESPIRATORY: No cough, wheezing, No hemoptysis; positive shortness of breath  CARDIOVASCULAR: No chest pain, palpitations, leg swelling  GASTROINTESTINAL: No abdominal or epigastric pain. positive nausea, No vomiting; positive diarrhea  GENITOURINARY: No dysuria, positive frequency, No urgency, No hematuria  NEUROLOGICAL: alert and oriented x 3,  No headaches,  SKIN: bed sores  MUSCULOSKELETAL: No joint pain or swelling, No extremity pain  PSYCHIATRIC: No depression, anxiety, mood swings, or difficulty sleeping

## 2019-12-26 NOTE — ED PROVIDER NOTE - ATTENDING CONTRIBUTION TO CARE
agree with above  >1 week of nausea, diarrhea, decreased PO, weakness.   no abd pain  abd nontender  found to have worsening PTX on right side, + SOB, but not at rest. 93% on RA.  will get CT abd ro acute intraabdominal pathology, ua, labs, admit for hydration and pulm

## 2019-12-26 NOTE — ED ADULT NURSE NOTE - NS ED NURSE LEVEL OF CONSCIOUSNESS AFFECT
Zaida from [facility] calls stating the patient had a syncopal spell and would like her pacemaker checked. I called back and said we can make her an appointment. The patient has left their office, she can call to make an appointment.
Appropriate

## 2019-12-26 NOTE — ED PROVIDER NOTE - OBJECTIVE STATEMENT
59 y female presents with nausea, diarrhea x 2 weeks, states her urine smells "strong",  states she was seen last week by her PMD Dr Lemon, was told her results were "fine", was recommended take immodium, kaopectate .  states she has hx of lung disease, nonsmoker, denies recent abx, no recent hospitalizations.    states she has a  pulmonary Dr Dean.  states she has a stroke 4 mos ago, with residual left side hemiplegia.  states she is not on blood thinners.  states she has a cardiologist at [place] cannot recall name.  states she is on seizure meds. 59 y female presents with nausea, diarrhea x 2 weeks, states her urine smells "strong",  states she was seen last week by her PMD Dr Lemon, had bw done, was told her results were "fine", was recommended take immodium, kaopectate .  states she has hx of lung disease, nonsmoker, denies recent abx, no recent hospitalizations.    states she has a  pulmonary Dr Dean.  states she has a stroke 4 mos ago, with residual left side hemiplegia.  states she is not on blood thinners.  states she has a cardiologist at [place] cannot recall name.  states she is on seizure meds.

## 2019-12-26 NOTE — PLAN
[FreeTextEntry1] : Allevyn Border to ulcers, patient was sent to ER for further management of her weakness.

## 2019-12-26 NOTE — H&P ADULT - HISTORY OF PRESENT ILLNESS
In the ED: temp 97.6, HR 97, /80, RR 15, SpO2 92% RA, 98% on 3L NC. WBC 11.10. UA: moderate LEC, WBC 26-50, moderate bacteria. Chest x-ray: Interval enlargement of a right-sided pneumothorax. Small bilateral pleural effusions. Chronic lung fibrotic changes. CT chest/abd/pelvis: Moderate-sized right pneumothorax. Worsening groundglass opacities in both lower lobes, possibly infection. Stercoral colitis. Indeterminant hepatic lesions, possibly metastatic disease. Received IV Rocephin, IV Flagyl, 1L NS Bolus. 59F with PMH of interstitial lung disease hx of pneumothorax (on 2L NC at home), hx pulmonary nodules, Meniere's disease, Non-Hodgkin's lymphoma (SCD/XRT/chemo at [facility] 2003), hx of CVA (3/2019 - residual L sided weakness, unable to use her L arm), not on ASA or Plavix due to GIB, Seizure disorder, tachycardia, MVP, hx of chronic SDH, presents with weakness, weight loss, nausea, and diarrhea for the past month. Patient admits to 4 lb weight loss in 3 weeks, was seen by her PCP and started on Zofran one week ago. Patient reports going to wound care prior to admission for bed sores, and was advised ED evaluation. Per  at bedside, patient has been having loose BMs several times a day with foul smelling urine for 4 days. Denies fever. Patient also has felt too weak to get out of bed and started using a diaper to urinate/have BMs. Has mainly been wheelchair bound recently due to weakness, but at baseline patient is able to stand and walk short distances. Patient uses 2L NC at home and noticed her SpO2 at 75% on RA with ambulation. Patient was also switched to Oxcarbazepine 2-3 months ago and noticed her symptoms of anxiety, weakness, and "memory" have worsened.  Of note, at night when patient takes her Oxcarbazepine, she starts yelling and having "hallucinations."     In the ED: temp 97.6, HR 97, /80, RR 15, SpO2 92% RA, 98% on 3L NC. WBC 11.10. UA: moderate LEC, WBC 26-50, moderate bacteria. Chest x-ray: Interval enlargement of a right-sided pneumothorax. Small bilateral pleural effusions. Chronic lung fibrotic changes. CT chest/abd/pelvis: Moderate-sized right pneumothorax. Worsening groundglass opacities in both lower lobes, possibly infection. Stercoral colitis. Indeterminant hepatic lesions, possibly metastatic disease. Received IV Rocephin, IV Flagyl, 1L NS Bolus. EKG: NSR 88 59F with PMH of interstitial lung disease hx of pneumothorax (on 2L NC at home), hx pulmonary nodules, Meniere's disease, Non-Hodgkin's lymphoma (SCD/XRT/chemo at [facility] 2003), hx of CVA (3/2019 - residual L sided weakness, unable to use her L arm), not on ASA or Plavix due to GIB, Seizure disorder, tachycardia, MVP, hx of chronic SDH, presents with weakness, weight loss, nausea, and diarrhea for the past month. Patient admits to 4 lb weight loss in 3 weeks, was seen by her PCP and started on Zofran one week ago. Patient reports going to wound care prior to admission for bed sores, and was advised ED evaluation. Per  at bedside, patient has been having loose BMs several times a day with foul smelling urine for 4 days. Denies fever. Patient also has felt too weak to get out of bed and started using a diaper to urinate/have BMs. Has mainly been wheelchair bound recently due to weakness, but at baseline patient is able to stand and walk short distances. Patient uses 2L NC at home and noticed her SpO2 at 75% on RA with ambulation. Patient was also switched to Oxcarbazepine 2-3 months ago and noticed her symptoms of anxiety, weakness, and "memory" have worsened.  Of note, at night when patient takes her Oxcarbazepine, she starts yelling and having "hallucinations."     In the ED: temp 97.6, HR 97, /80, RR 15, SpO2 92% RA, 98% on 3L NC. WBC 11.10. UA: moderate LEC, WBC 26-50, moderate bacteria. Chest x-ray: Interval enlargement of a right-sided pneumothorax. Small bilateral pleural effusions. Chronic lung fibrotic changes. CT chest/abd/pelvis: Moderate-sized right pneumothorax. Worsening groundglass opacities in both lower lobes, possibly infection. Stercoral colitis. Indeterminant hepatic lesions, possibly metastatic disease. Received IV Rocephin, IV Flagyl, 1L NS Bolus. EKG: NSR 88    Of note,  wants us to check oxcarbazepine levels.

## 2019-12-26 NOTE — H&P ADULT - PROBLEM SELECTOR PLAN 2
UA: moderate LEC, WBC 26-50, moderate bacteria. Patient reports foul smell, currently using diapers due to feeling weak  -s/p IV Rocephin in the ED. Continue IV Rocephin  -Follow up urine culture

## 2019-12-26 NOTE — ED ADULT NURSE NOTE - OBJECTIVE STATEMENT
Pt received in bed alert and oriented and resting in bed with the c/o  recent weight loss, nausea/vomiting/diarrhea. and possible UTI. Pt received in bed alert and oriented and resting in bed with the c/o  recent weight loss, nausea/vomiting/diarrhea. and possible UTI. As per Md's orders IV cristobal placed blood specimen obtained and sent to the lab. Nursing care ongoing and safety maintained. Pt has hx of old stroke and left arm is weak and has mild contracture.

## 2019-12-26 NOTE — H&P ADULT - ATTENDING COMMENTS
Pt's pneumothorax is worse than before.  Dr. Rashid wanted ICU to evaluate pt.  Pt was not a candidate for the unit.  Will be in telemetry.  Continue IV abx for the UTI and colitis.  GI consulted.    Check Oxcarbazapine levels in AM.

## 2019-12-26 NOTE — ED PROVIDER NOTE - CLINICAL SUMMARY MEDICAL DECISION MAKING FREE TEXT BOX
nausea, diarrhea, episode of chills, hx of stroke 4 mos ago, found to have enlarged pneumothorax on cxr, pulm consult, labs, ekg, admission

## 2019-12-27 LAB
ALBUMIN SERPL ELPH-MCNC: 2.5 G/DL — LOW (ref 3.3–5)
ALP SERPL-CCNC: 104 U/L — SIGNIFICANT CHANGE UP (ref 40–120)
ALT FLD-CCNC: 27 U/L — SIGNIFICANT CHANGE UP (ref 12–78)
ANION GAP SERPL CALC-SCNC: 6 MMOL/L — SIGNIFICANT CHANGE UP (ref 5–17)
AST SERPL-CCNC: 21 U/L — SIGNIFICANT CHANGE UP (ref 15–37)
BASOPHILS # BLD AUTO: 0.04 K/UL — SIGNIFICANT CHANGE UP (ref 0–0.2)
BASOPHILS NFR BLD AUTO: 0.5 % — SIGNIFICANT CHANGE UP (ref 0–2)
BILIRUB SERPL-MCNC: 0.3 MG/DL — SIGNIFICANT CHANGE UP (ref 0.2–1.2)
BUN SERPL-MCNC: 11 MG/DL — SIGNIFICANT CHANGE UP (ref 7–23)
CALCIUM SERPL-MCNC: 8.7 MG/DL — SIGNIFICANT CHANGE UP (ref 8.5–10.1)
CHLORIDE SERPL-SCNC: 106 MMOL/L — SIGNIFICANT CHANGE UP (ref 96–108)
CO2 SERPL-SCNC: 32 MMOL/L — HIGH (ref 22–31)
CREAT SERPL-MCNC: 0.6 MG/DL — SIGNIFICANT CHANGE UP (ref 0.5–1.3)
EOSINOPHIL # BLD AUTO: 0.21 K/UL — SIGNIFICANT CHANGE UP (ref 0–0.5)
EOSINOPHIL NFR BLD AUTO: 2.6 % — SIGNIFICANT CHANGE UP (ref 0–6)
GLUCOSE SERPL-MCNC: 111 MG/DL — HIGH (ref 70–99)
HCT VFR BLD CALC: 35.5 % — SIGNIFICANT CHANGE UP (ref 34.5–45)
HGB BLD-MCNC: 11.2 G/DL — LOW (ref 11.5–15.5)
IMM GRANULOCYTES NFR BLD AUTO: 0.6 % — SIGNIFICANT CHANGE UP (ref 0–1.5)
LYMPHOCYTES # BLD AUTO: 1.06 K/UL — SIGNIFICANT CHANGE UP (ref 1–3.3)
LYMPHOCYTES # BLD AUTO: 13.1 % — SIGNIFICANT CHANGE UP (ref 13–44)
MCHC RBC-ENTMCNC: 28.4 PG — SIGNIFICANT CHANGE UP (ref 27–34)
MCHC RBC-ENTMCNC: 31.5 GM/DL — LOW (ref 32–36)
MCV RBC AUTO: 90.1 FL — SIGNIFICANT CHANGE UP (ref 80–100)
MONOCYTES # BLD AUTO: 0.87 K/UL — SIGNIFICANT CHANGE UP (ref 0–0.9)
MONOCYTES NFR BLD AUTO: 10.8 % — SIGNIFICANT CHANGE UP (ref 2–14)
NEUTROPHILS # BLD AUTO: 5.84 K/UL — SIGNIFICANT CHANGE UP (ref 1.8–7.4)
NEUTROPHILS NFR BLD AUTO: 72.4 % — SIGNIFICANT CHANGE UP (ref 43–77)
NRBC # BLD: 0 /100 WBCS — SIGNIFICANT CHANGE UP (ref 0–0)
PLATELET # BLD AUTO: 426 K/UL — HIGH (ref 150–400)
POTASSIUM SERPL-MCNC: 3.1 MMOL/L — LOW (ref 3.5–5.3)
POTASSIUM SERPL-SCNC: 3.1 MMOL/L — LOW (ref 3.5–5.3)
PROT SERPL-MCNC: 6.1 G/DL — SIGNIFICANT CHANGE UP (ref 6–8.3)
RBC # BLD: 3.94 M/UL — SIGNIFICANT CHANGE UP (ref 3.8–5.2)
RBC # FLD: 14.9 % — HIGH (ref 10.3–14.5)
SODIUM SERPL-SCNC: 144 MMOL/L — SIGNIFICANT CHANGE UP (ref 135–145)
WBC # BLD: 8.07 K/UL — SIGNIFICANT CHANGE UP (ref 3.8–10.5)
WBC # FLD AUTO: 8.07 K/UL — SIGNIFICANT CHANGE UP (ref 3.8–10.5)

## 2019-12-27 PROCEDURE — 99221 1ST HOSP IP/OBS SF/LOW 40: CPT

## 2019-12-27 PROCEDURE — 71045 X-RAY EXAM CHEST 1 VIEW: CPT | Mod: 26

## 2019-12-27 PROCEDURE — 99233 SBSQ HOSP IP/OBS HIGH 50: CPT | Mod: GC

## 2019-12-27 RX ORDER — POTASSIUM CHLORIDE 20 MEQ
40 PACKET (EA) ORAL EVERY 4 HOURS
Refills: 0 | Status: COMPLETED | OUTPATIENT
Start: 2019-12-27 | End: 2019-12-27

## 2019-12-27 RX ORDER — INFLUENZA VIRUS VACCINE 15; 15; 15; 15 UG/.5ML; UG/.5ML; UG/.5ML; UG/.5ML
0.5 SUSPENSION INTRAMUSCULAR ONCE
Refills: 0 | Status: DISCONTINUED | OUTPATIENT
Start: 2019-12-27 | End: 2020-01-24

## 2019-12-27 RX ORDER — ONDANSETRON 8 MG/1
4 TABLET, FILM COATED ORAL ONCE
Refills: 0 | Status: COMPLETED | OUTPATIENT
Start: 2019-12-27 | End: 2019-12-27

## 2019-12-27 RX ORDER — ACETAMINOPHEN 500 MG
650 TABLET ORAL ONCE
Refills: 0 | Status: COMPLETED | OUTPATIENT
Start: 2019-12-27 | End: 2019-12-27

## 2019-12-27 RX ADMIN — Medication 1 TABLET(S): at 12:27

## 2019-12-27 RX ADMIN — Medication 100 MILLIGRAM(S): at 04:35

## 2019-12-27 RX ADMIN — ONDANSETRON 4 MILLIGRAM(S): 8 TABLET, FILM COATED ORAL at 12:27

## 2019-12-27 RX ADMIN — TIOTROPIUM BROMIDE 1 CAPSULE(S): 18 CAPSULE ORAL; RESPIRATORY (INHALATION) at 07:34

## 2019-12-27 RX ADMIN — ENOXAPARIN SODIUM 40 MILLIGRAM(S): 100 INJECTION SUBCUTANEOUS at 12:27

## 2019-12-27 RX ADMIN — PANTOPRAZOLE SODIUM 40 MILLIGRAM(S): 20 TABLET, DELAYED RELEASE ORAL at 08:31

## 2019-12-27 RX ADMIN — Medication 1 TABLET(S): at 09:06

## 2019-12-27 RX ADMIN — Medication 40 MILLIEQUIVALENT(S): at 17:52

## 2019-12-27 RX ADMIN — OXCARBAZEPINE 300 MILLIGRAM(S): 300 TABLET, FILM COATED ORAL at 09:06

## 2019-12-27 RX ADMIN — Medication 650 MILLIGRAM(S): at 21:02

## 2019-12-27 RX ADMIN — CEFTRIAXONE 100 MILLIGRAM(S): 500 INJECTION, POWDER, FOR SOLUTION INTRAMUSCULAR; INTRAVENOUS at 17:53

## 2019-12-27 RX ADMIN — OXCARBAZEPINE 300 MILLIGRAM(S): 300 TABLET, FILM COATED ORAL at 21:02

## 2019-12-27 RX ADMIN — Medication 40 MILLIEQUIVALENT(S): at 15:13

## 2019-12-27 RX ADMIN — ONDANSETRON 4 MILLIGRAM(S): 8 TABLET, FILM COATED ORAL at 08:30

## 2019-12-27 RX ADMIN — Medication 1 TABLET(S): at 17:52

## 2019-12-27 NOTE — PROGRESS NOTE ADULT - PROBLEM SELECTOR PLAN 3
CT abd/pelvis: Large amount of stool within the rectum with surrounding inflammatory change. Stercoral colitis.  Likely overflow diarrhea   -s/p IV Flagyl in the ED. Continue IV Flagyl   -Start Senna at bedtime  -Bacid TID  -Consider Mesalamine suppository  GI consulted. CT abd/pelvis: Large amount of stool within the rectum with surrounding inflammatory change. Stercoral colitis.  Likely overflow diarrhea   - s/p IV Flagyl in the ED. D/C Flagyl   - Start Senna at bedtime  - Bacid TID  - Consider Mesalamine suppository  - GI consulted Dr. Hutchinson   - F/U stool studies CT abd/pelvis: Large amount of stool within the rectum with surrounding inflammatory change. Stercoral colitis.  Likely overflow diarrhea   - s/p IV Flagyl in the ED. D/C Flagyl - unlikely infectious  - continue Senna at bedtime  - Bacid TID  - will consider mesalamine suppository, however patient reportedly with large bowel movement this morning  - GI consulted Dr. Hutchinson   - F/U stool studies

## 2019-12-27 NOTE — PROVIDER CONTACT NOTE (EICU) - BACKGROUND
A 59 year old woman with a history of ILD found to have mod-right sided PTX, as per pulmonary attg note, there was no PTX on CT chest done at [facility] on Oct 28th.  Pt is comfortable on 2L, O2sat 98%

## 2019-12-27 NOTE — CONSULT NOTE ADULT - ASSESSMENT
58 y/o F with PMHs of IBS-Diarrhea predominant, ILD (on 2L NC at home), PTX, pulmonary nodules, Meniere's disease, Non-Hodgkin's lymphoma (SCD/XRT/chemo at [facility] 2003), CVA 3/2019 w/residual L sided weakness, unable to use her L arm), not on ASA or Plavix due to GIB, seizure disorder, MVP, hx of chronic SDH who presents with weakness, nausea, diarrhea, and foul smelling urine for the past week admitted for further management of incidentally found worsening pneumothorax, UTI, and stercocolitis on CT.

## 2019-12-27 NOTE — CONSULT NOTE ADULT - SUBJECTIVE AND OBJECTIVE BOX
HPI:  59F with PMH of interstitial lung disease hx of pneumothorax (on 2L NC at home), hx pulmonary nodules, Meniere's disease, Non-Hodgkin's lymphoma (SCD/XRT/chemo at [facility] ), hx of CVA (3/2019 - residual L sided weakness, unable to use her L arm), Seizure disorder. She presents with weakness, weight loss, nausea, and diarrhea for the past month. She's had a diagnosis of interstitial lung disease for quite sometime. She never underwent a lung biopsy. She has a pulmonologist at [facility] that follows her.   She wears two liters of oxygen at baseline. She activity level is low.     CT chest shows a small pneumothorax on the left with chronic intersitial changes of the lung.  She has a previous history of left pneumothorax which ws managed without a chest tube. She denies previous chest surgeries.       PAST MEDICAL & SURGICAL HISTORY:  Interstitial lung disease: on home o2 prn  NHL (non-Hodgkin's lymphoma): Agem 45 sp chemo/rt/stem cell  Transient cerebral ischemia, unspecified type  Mitral prolapse  History of tonsillectomy  History of appendectomy      REVIEW OF SYSTEMS      General: + 43 pound weight loss, + weakness     Skin/Breast: No Rashes/ Lesions/ Masses  	  Ophthalmologic: No Blurry vision/ Glaucoma/ Blindness  	  ENMT: No Hearing loss/ Drainage/ Lesions	    Respiratory and Thorax: No Cough/ Wheezing/ SOB/ Hemoptysis/ Sputum production  	  Cardiovascular: No Chest pain/ Palpitations/ Diaphoresis	    Gastrointestinal: No Nausea/ Vomiting/ Constipation/ Appetite Change	    Genitourinary: No Heamturia/ Dysuria/ Frequency change/ Impotence	    Musculoskeletal: No Pain/ Weakness/ Claudication	    Neurological: No Seizures/ TIA/CVA/ Parastesias	    Psychiatric: No Dementia/ Depression/ SI/HI	    Hematology/Lymphatics: No hx of bleeding/ Edema	    Endocrine:	No Hyperglycemia/ Hypoglycemia    Allergic/Immunologic:	 No Anaphylaxis/ Intolerance/ Recent illnesses    MEDICATIONS  (STANDING):  cefTRIAXone   IVPB 1000 milliGRAM(s) IV Intermittent every 24 hours  enoxaparin Injectable 40 milliGRAM(s) SubCutaneous daily  influenza   Vaccine 0.5 milliLiter(s) IntraMuscular once  lactobacillus acidophilus 1 Tablet(s) Oral three times a day with meals  OXcarbazepine 300 milliGRAM(s) Oral two times a day  pantoprazole    Tablet 40 milliGRAM(s) Oral before breakfast  potassium chloride    Tablet ER 40 milliEquivalent(s) Oral every 4 hours  propranolol 20 milliGRAM(s) Oral three times a day  senna 2 Tablet(s) Oral at bedtime  tiotropium 18 MICROgram(s) Capsule 1 Capsule(s) Inhalation daily    MEDICATIONS  (PRN):  ondansetron Injectable 4 milliGRAM(s) IV Push every 8 hours PRN Nausea and/or Vomiting      Allergies    IV Contrast (Anaphylaxis)  shellfish. (Anaphylaxis)    Intolerances        SOCIAL HISTORY:  Occupation: disabled   Smoking Hx: denies  Etoh Hx: denies  IVDA Hx: denies    FAMILY HISTORY:  Family history of stroke  Family history of breast cancer: Mother    unless noted, no significant family hx with Mother, Father, Siblings    Vital Signs Last 24 Hrs  T(C): 36.6 (27 Dec 2019 14:46), Max: 37.2 (26 Dec 2019 19:30)  T(F): 97.9 (27 Dec 2019 14:46), Max: 98.9 (26 Dec 2019 19:30)  HR: 81 (27 Dec 2019 14:46) (73 - 100)  BP: 99/65 (27 Dec 2019 14:46) (99/65 - 133/67)  BP(mean): --  RR: 17 (27 Dec 2019 14:46) (16 - 20)  SpO2: 99% (27 Dec 2019 14:46) (92% - 99%)    General: WN/WD NAD  Neurology: Awake, nonfocal, VAIL x 4  Eyes: Scleras clear, PERRLA/ EOMI, Gross vision intact  ENT:Gross hearing intact, grossly patent pharynx, no stridor  Neck: Neck supple, trachea midline, No JVD,   Respiratory: Diminished breath sounds bilaterally   CV: RRR, S1S2, no murmurs, rubs or gallops  Abdominal: Soft, NT, ND +BS,   Extremities: No edema, + peripheral pulses  Skin: No Rashes, Hematoma, Ecchymosis  Lymphatic: No Neck, axilla, groin LAD  Psych: Oriented x 3, normal affect      LABS:                        11.2   8.07  )-----------( 426      ( 27 Dec 2019 06:16 )             35.5         144  |  106  |  11  ----------------------------<  111<H>  3.1<L>   |  32<H>  |  0.60    Ca    8.7      27 Dec 2019 06:16    TPro  6.1  /  Alb  2.5<L>  /  TBili  0.3  /  DBili  x   /  AST  21  /  ALT  27  /  AlkPhos  104  12-27      Urinalysis Basic - ( 26 Dec 2019 17:21 )    Color: Yellow / Appearance: Turbid / S.020 / pH: x  Gluc: x / Ketone: Trace  / Bili: Moderate / Urobili: 4   Blood: x / Protein: 75 mg/dL / Nitrite: Negative   Leuk Esterase: Moderate / RBC: 6-10 /HPF / WBC 26-50   Sq Epi: x / Non Sq Epi: Moderate / Bacteria: Moderate        RADIOLOGY & ADDITIONAL STUDIES:    ASSESSMENT:   59yFemalePAST MEDICAL & SURGICAL HISTORY:  Interstitial lung disease: on home o2 prn  NHL (non-Hodgkin's lymphoma): Agem 43 sp chemo/rt/stem cell  Transient cerebral ischemia, unspecified type  Mitral prolapse  History of tonsillectomy  History of appendectomy  HEALTH ISSUES - PROBLEM Dx:  Prophylactic measure: Prophylactic measure  GERD (gastroesophageal reflux disease): GERD (gastroesophageal reflux disease)  Seizure disorder: Seizure disorder  Tachycardia: Tachycardia  Nausea: Nausea  Colitis: Colitis  UTI (urinary tract infection): UTI (urinary tract infection)  Pneumothorax on right: Pneumothorax on right      HEALTH ISSUES - R/O PROBLEM Dx:      PLAN:    Right pneumothorax in the setting of interstitial lung disease.     Recommend daily AM CXR's for further monitoring.   I discussed with the Patient is she should have acute SOB, to let the nurse know. Will repeat CXR if any clinical changes in breathing status   If the pneumothroax increases and OR if her clinical status worsens, those are both indications for chest tube placement.     If pnuemo is large, percutaneous pigtail placement becomes more safe   If pneumo remains small to moderate, an open tube thoracostomy maybe safer.   Discussed plan with daughter at bedside and also the nurse     Please call  if there are any acute changes in clinical status.

## 2019-12-27 NOTE — CHART NOTE - NSCHARTNOTEFT_GEN_A_CORE
Upon Nutritional Assessment by the Registered Dietitian your patient was determined to meet criteria / has evidence of the following diagnosis/diagnoses:          [ ]  Mild Protein Calorie Malnutrition        [ ]  Moderate Protein Calorie Malnutrition        [x ] Severe Protein Calorie Malnutrition acute on chronic        [ ] Unspecified Protein Calorie Malnutrition        [ ] Underweight / BMI <19        [ ] Morbid Obesity / BMI > 40      Findings as based on:  •  Comprehensive nutrition assessment and consultation  •  Calorie counts (nutrient intake analysis)  •  Food acceptance and intake status from observations by staff  •  Follow up  •  Patient education  •  Intervention secondary to interdisciplinary rounds  •   concerns    muscle/fat loss, wt loss, lack of appetite past 3 weeks  Treatment:    The following diet has been recommended:  Vegetarian choices offered, snacks discussed/added    PROVIDER Section:     By signing this assessment you are acknowledging and agree with the diagnosis/diagnoses assigned by the Registered Dietitian    Comments:

## 2019-12-27 NOTE — PROGRESS NOTE ADULT - PROBLEM SELECTOR PLAN 1
PTX right side - CT chest reviewed - needs to use I jesus and monitor vs and HD and Sat - supportive o2 regimen - keep sat > 88 pct  no urgency in placing Chest Tube -   PTX appears to be acute on chronic situation in a patient with Chronic Lung Disease - ILD - followed by Dr. Bettye Hazel in [facility]   will follow and monitor  again, no urgency for placing chest tube  will consider thoracic surgery consultation   supportive medical regimen  overnight ICU eval noted

## 2019-12-27 NOTE — PROGRESS NOTE ADULT - PROBLEM SELECTOR PLAN 4
patient reports chronic nausea for the past month. Had an EGD/Colonoscopy this year that was "negative" per patient  -Zofran IV PRN for nausea  -Nutrition consult  -GI, Dr. De La Vega, consulted patient reports chronic nausea for the past month. Had an EGD/Colonoscopy this year that was "negative" per patient  - Zofran IV PRN for nausea  -Nutrition consult  -GI, Dr. De La Vega, consulted

## 2019-12-27 NOTE — PROGRESS NOTE ADULT - ATTENDING COMMENTS
59F with PMH of interstitial lung disease hx of pneumothorax (on 2L NC at home), hx pulmonary nodules, Meniere's disease, Non-Hodgkin's lymphoma (SCD/XRT/chemo at [facility] 2003), hx of CVA (3/2019 - residual L sided weakness, unable to use her L arm), not on ASA or Plavix due to GIB, Seizure disorder, tachycardia, MVP, hx of chronic SDH, presents with weakness, weight loss, nausea, and diarrhea admitted for R pneumothorax, UTI, colitis. Plan: apprec pulm and ctsx recs, serial xrays, monitor clinical course, cont empirica antibx

## 2019-12-27 NOTE — PROGRESS NOTE ADULT - PROBLEM SELECTOR PLAN 2
UA: moderate LEC, WBC 26-50, moderate bacteria. Patient reports foul smell, currently using diapers due to feeling weak  -s/p IV Rocephin in the ED. Continue IV Rocephin  -Follow up urine culture UA: moderate LEC, WBC 26-50, moderate bacteria. Patient reports foul smell, currently using diapers due to feeling weak  - s/p IV Rocephin in the ED. Continue IV Rocephin  - Follow up urine culture

## 2019-12-27 NOTE — CHART NOTE - NSCHARTNOTEFT_GEN_A_CORE
spoke with  at   reviewed CT chest  provided a copy of CT chest report  no intervention planned at present  will follow up with Dr. Hazel on CT - [facility] Pulm -

## 2019-12-27 NOTE — DIETITIAN INITIAL EVALUATION ADULT. - OTHER INFO
Pt admit with pneumothorax, no chest tube at present, Admit with weakness, decreased intake and loose stools(likely overflow diarrhea per MD).Hx NHL, TIA. Pt dtr states pt has difficulty breathing when she has to have a BM so she holds it in. Pt reports wt loss ~5# in 3 weeks, had lost wt last year and gained some back. Per dtr, she feels pt has anorexia-many food aversions/dislikes, refuses po supplements. Cannot tolerate too much lactose. Sacral st 2 pressure injury. NFPE reveals severe temporal, deltoid muscle loss, moderate triceps/orbital fat loss. Ate only a small amount of grilled cheese today.. Follows a vegetarian diet with limited protein intake.

## 2019-12-27 NOTE — PROGRESS NOTE ADULT - SUBJECTIVE AND OBJECTIVE BOX
Patient is a 59y old  Female who presents with a chief complaint of pneumothorax, colitis, UTI (27 Dec 2019 10:51)      FROM ADMISSION H+P:   HPI:  59F with PMH of interstitial lung disease hx of pneumothorax (on 2L NC at home), hx pulmonary nodules, Meniere's disease, Non-Hodgkin's lymphoma (SCD/XRT/chemo at [facility] ), hx of CVA (3/2019 - residual L sided weakness, unable to use her L arm), not on ASA or Plavix due to GIB, Seizure disorder, tachycardia, MVP, hx of chronic SDH, presents with weakness, weight loss, nausea, and diarrhea for the past month. Patient admits to 4 lb weight loss in 3 weeks, was seen by her PCP and started on Zofran one week ago. Patient reports going to wound care prior to admission for bed sores, and was advised ED evaluation. Per  at bedside, patient has been having loose BMs several times a day with foul smelling urine for 4 days. Denies fever. Patient also has felt too weak to get out of bed and started using a diaper to urinate/have BMs. Has mainly been wheelchair bound recently due to weakness, but at baseline patient is able to stand and walk short distances. Patient uses 2L NC at home and noticed her SpO2 at 75% on RA with ambulation. Patient was also switched to Oxcarbazepine 2-3 months ago and noticed her symptoms of anxiety, weakness, and "memory" have worsened.  Of note, at night when patient takes her Oxcarbazepine, she starts yelling and having "hallucinations."     In the ED: temp 97.6, HR 97, /80, RR 15, SpO2 92% RA, 98% on 3L NC. WBC 11.10. UA: moderate LEC, WBC 26-50, moderate bacteria. Chest x-ray: Interval enlargement of a right-sided pneumothorax. Small bilateral pleural effusions. Chronic lung fibrotic changes. CT chest/abd/pelvis: Moderate-sized right pneumothorax. Worsening groundglass opacities in both lower lobes, possibly infection. Stercoral colitis. Indeterminant hepatic lesions, possibly metastatic disease. Received IV Rocephin, IV Flagyl, 1L NS Bolus. EKG: NSR 88    Of note,  wants us to check oxcarbazepine levels. (26 Dec 2019 21:14)      ----  INTERVAL HPI/OVERNIGHT EVENTS: Pt seen and evaluated at the bedside. No acute overnight events occurred.     ----  PAST MEDICAL & SURGICAL HISTORY:  Interstitial lung disease: on home o2 prn  NHL (non-Hodgkin's lymphoma): Agem 45 sp chemo/rt/stem cell  Transient cerebral ischemia, unspecified type  Mitral prolapse  History of tonsillectomy  History of appendectomy      FAMILY HISTORY:  Family history of stroke  Family history of breast cancer: Mother      Allergies    IV Contrast (Anaphylaxis)  shellfish. (Anaphylaxis)    Intolerances        ----  REVIEW OF SYSTEMS:  CONSTITUTIONAL: denies fever, chills, fatigue, weakness  HEENT: denies blurred vision, sore throat  SKIN: denies new lesions, rash  CARDIOVASCULAR: denies chest pain, chest pressure, palpitations  RESPIRATORY: denies shortness of breath, sputum production  GASTROINTESTINAL: denies nausea, vomiting, diarrhea, abdominal pain  GENITOURINARY: denies dysuria, discharge  NEUROLOGICAL: denies numbness, headache, focal weakness  MUSCULOSKELETAL: denies new joint pain, muscle aches  HEMATOLOGIC: denies gross bleeding, bruising  LYMPHATICS: denies enlarged lymph nodes, extremity swelling  PSYCHIATRIC: denies recent changes in anxiety, depression  ENDOCRINOLOGIC: denies sweating, cold or heat intolerance    ----  PHYSICAL EXAM:  GENERAL: patient appears well, no acute distress, appropriately interactive  EYES: sclera clear, no exudates  ENMT: oropharynx clear without erythema, moist mucous membranes  NECK: supple, soft, no thyromegaly noted  LUNGS: good air entry bilaterally, clear to auscultation, symmetric breath sounds, no wheezing or rhonchi appreciated  HEART: soft S1/S2, regular rate and rhythm, no murmurs noted, no noted edema to b/l LE  GASTROINTESTINAL: abdomen is soft, nontender, nondistended, normoactive bowel sounds, no palpable masses  INTEGUMENT: good skin turgor, appropriate for ethnicity, appears well perfused, no jaundice noted  MUSCULOSKELETAL: no clubbing or cyanosis, no obvious deformity  NEUROLOGIC: awake, alert, oriented x3, good muscle tone in 4 extremities, no obvious sensory deficits  PSYCHIATRIC: mood is good, affect is congruent with mood, linear and logical thought process  HEME/LYMPH: no palpable supraclavicular nodules, no obvious ecchymosis     T(C): 36.7 (19 @ 09:14), Max: 37.2 (19 @ 19:30)  HR: 98 (19 @ 09:14) (73 - 100)  BP: 106/71 (19 @ 09:14) (106/71 - 133/67)  RR: 18 (19 @ 09:14) (15 - 20)  SpO2: 96% (19 @ 09:14) (92% - 98%)  Wt(kg): --    ----  I&O's Summary    26 Dec 2019 07:01  -  27 Dec 2019 07:00  --------------------------------------------------------  IN: 700 mL / OUT: 0 mL / NET: 700 mL        LABS:                        11.2   8.07  )-----------( 426      ( 27 Dec 2019 06:16 )             35.5         144  |  106  |  11  ----------------------------<  111<H>  3.1<L>   |  32<H>  |  0.60    Ca    8.7      27 Dec 2019 06:16    TPro  6.1  /  Alb  2.5<L>  /  TBili  0.3  /  DBili  x   /  AST  21  /  ALT  27  /  AlkPhos  104        Urinalysis Basic - ( 26 Dec 2019 17:21 )    Color: Yellow / Appearance: Turbid / S.020 / pH: x  Gluc: x / Ketone: Trace  / Bili: Moderate / Urobili: 4   Blood: x / Protein: 75 mg/dL / Nitrite: Negative   Leuk Esterase: Moderate / RBC: 6-10 /HPF / WBC 26-50   Sq Epi: x / Non Sq Epi: Moderate / Bacteria: Moderate      CAPILLARY BLOOD GLUCOSE                    ----  Personally reviewed:  Vital sign trends: [  ] yes    [  ] no     [  ] n/a  Laboratory results: [  ] yes    [  ] no     [  ] n/a  Radiology results: [  ] yes    [  ] no     [  ] n/a  Culture results: [  ] yes    [  ] no     [  ] n/a  Consultant recommendations: [  ] yes    [  ] no     [  ] n/a Patient is a 59y old  Female who presents with a chief complaint of pneumothorax, colitis, UTI (27 Dec 2019 10:51)      FROM ADMISSION H+P:   HPI:  59F with PMH of interstitial lung disease hx of pneumothorax (on 2L NC at home), hx pulmonary nodules, Meniere's disease, Non-Hodgkin's lymphoma (SCD/XRT/chemo at [facility] ), hx of CVA (3/2019 - residual L sided weakness, unable to use her L arm), not on ASA or Plavix due to GIB, Seizure disorder, tachycardia, MVP, hx of chronic SDH, presents with weakness, weight loss, nausea, and diarrhea for the past month. Patient admits to 4 lb weight loss in 3 weeks, was seen by her PCP and started on Zofran one week ago. Patient reports going to wound care prior to admission for bed sores, and was advised ED evaluation. Per  at bedside, patient has been having loose BMs several times a day with foul smelling urine for 4 days. Denies fever. Patient also has felt too weak to get out of bed and started using a diaper to urinate/have BMs. Has mainly been wheelchair bound recently due to weakness, but at baseline patient is able to stand and walk short distances. Patient uses 2L NC at home and noticed her SpO2 at 75% on RA with ambulation. Patient was also switched to Oxcarbazepine 2-3 months ago and noticed her symptoms of anxiety, weakness, and "memory" have worsened.  Of note, at night when patient takes her Oxcarbazepine, she starts yelling and having "hallucinations."     In the ED: temp 97.6, HR 97, /80, RR 15, SpO2 92% RA, 98% on 3L NC. WBC 11.10. UA: moderate LEC, WBC 26-50, moderate bacteria. Chest x-ray: Interval enlargement of a right-sided pneumothorax. Small bilateral pleural effusions. Chronic lung fibrotic changes. CT chest/abd/pelvis: Moderate-sized right pneumothorax. Worsening groundglass opacities in both lower lobes, possibly infection. Stercoral colitis. Indeterminant hepatic lesions, possibly metastatic disease. Received IV Rocephin, IV Flagyl, 1L NS Bolus. EKG: NSR 88    Of note,  wants us to check oxcarbazepine levels. (26 Dec 2019 21:14)      ----  INTERVAL HPI/OVERNIGHT EVENTS: Pt seen and evaluated at the bedside in the ER. No acute overnight events occurred. Patient denies fever, chills, abdominal pain or vomiting. Nausea has improved with zofran. Reports a large bowel movement late this morning. Reports weight loss of 5-6 lbs over the past month.     ----  PAST MEDICAL & SURGICAL HISTORY:  Interstitial lung disease: on home o2 prn  NHL (non-Hodgkin's lymphoma): Agem 45 sp chemo/rt/stem cell  Transient cerebral ischemia, unspecified type  Mitral prolapse  History of tonsillectomy  History of appendectomy      FAMILY HISTORY:  Family history of stroke  Family history of breast cancer: Mother      Allergies    IV Contrast (Anaphylaxis)  shellfish. (Anaphylaxis)    Intolerances        ----  REVIEW OF SYSTEMS:  CONSTITUTIONAL: (+) weight loss; denies fever, chills, fatigue, weakness  HEENT: denies blurred vision, sore throat  SKIN: denies new lesions, rash  CARDIOVASCULAR: denies chest pain, chest pressure, palpitations  RESPIRATORY: denies shortness of breath, sputum production  GASTROINTESTINAL: (+) nausea, loose stools; denies vomiting, abdominal pain  GENITOURINARY: denies dysuria, discharge  NEUROLOGICAL: denies numbness, headache, focal weakness  MUSCULOSKELETAL: denies new joint pain, muscle aches  HEMATOLOGIC: denies gross bleeding, bruising  LYMPHATICS: denies enlarged lymph nodes, extremity swelling  PSYCHIATRIC: denies recent changes in anxiety, depression  ENDOCRINOLOGIC: denies sweating, cold or heat intolerance      ----  PHYSICAL EXAM:  GENERAL: patient appears well, no acute distress, appropriately interactive  EYES: sclera clear, no exudates  ENMT: oropharynx clear without erythema, moist mucous membranes  NECK: supple, soft, no thyromegaly noted  LUNGS: good air entry bilaterally, clear to auscultation, symmetric breath sounds, no wheezing or rhonchi appreciated  HEART: soft S1/S2, regular rate and rhythm, no murmurs noted, no noted edema to b/l LE  GASTROINTESTINAL: abdomen is soft, nontender, nondistended, decreased bowel sounds, no palpable masses  INTEGUMENT: good skin turgor, appropriate for ethnicity, appears well perfused, no jaundice noted  MUSCULOSKELETAL: no clubbing or cyanosis, no obvious deformity  NEUROLOGIC: awake, alert, oriented x3, good muscle tone in 4 extremities, no obvious sensory deficits  PSYCHIATRIC: mood is good, affect is congruent with mood, linear and logical thought process    T(C): 36.7 (19 @ 09:14), Max: 37.2 (19 @ 19:30)  HR: 98 (19 @ 09:14) (73 - 100)  BP: 106/71 (19 @ 09:14) (106/71 - 133/67)  RR: 18 (19 @ 09:14) (15 - 20)  SpO2: 96% (19 @ 09:14) (92% - 98%)  Wt(kg): --    ----  I&O's Summary    26 Dec 2019 07:01  -  27 Dec 2019 07:00  --------------------------------------------------------  IN: 700 mL / OUT: 0 mL / NET: 700 mL        LABS:                        11.2   8.07  )-----------( 426      ( 27 Dec 2019 06:16 )             35.5         144  |  106  |  11  ----------------------------<  111<H>  3.1<L>   |  32<H>  |  0.60    Ca    8.7      27 Dec 2019 06:16    TPro  6.1  /  Alb  2.5<L>  /  TBili  0.3  /  DBili  x   /  AST  21  /  ALT  27  /  AlkPhos  104        Urinalysis Basic - ( 26 Dec 2019 17:21 )    Color: Yellow / Appearance: Turbid / S.020 / pH: x  Gluc: x / Ketone: Trace  / Bili: Moderate / Urobili: 4   Blood: x / Protein: 75 mg/dL / Nitrite: Negative   Leuk Esterase: Moderate / RBC: 6-10 /HPF / WBC 26-50   Sq Epi: x / Non Sq Epi: Moderate / Bacteria: Moderate      CAPILLARY BLOOD GLUCOSE                    ----  Personally reviewed:  Vital sign trends: [  ] yes    [  ] no     [  ] n/a  Laboratory results: [  ] yes    [  ] no     [  ] n/a  Radiology results: [  ] yes    [  ] no     [  ] n/a  Culture results: [  ] yes    [  ] no     [  ] n/a  Consultant recommendations: [  ] yes    [  ] no     [  ] n/a Patient is a 59y old  Female who presents with a chief complaint of pneumothorax, colitis, UTI (27 Dec 2019 10:51)      FROM ADMISSION H+P:   HPI:  59F with PMH of interstitial lung disease hx of pneumothorax (on 2L NC at home), hx pulmonary nodules, Meniere's disease, Non-Hodgkin's lymphoma (SCD/XRT/chemo at [facility] ), hx of CVA (3/2019 - residual L sided weakness, unable to use her L arm), not on ASA or Plavix due to GIB, Seizure disorder, tachycardia, MVP, hx of chronic SDH, presents with weakness, weight loss, nausea, and diarrhea for the past month. Patient admits to 4 lb weight loss in 3 weeks, was seen by her PCP and started on Zofran one week ago. Patient reports going to wound care prior to admission for bed sores, and was advised ED evaluation. Per  at bedside, patient has been having loose BMs several times a day with foul smelling urine for 4 days. Denies fever. Patient also has felt too weak to get out of bed and started using a diaper to urinate/have BMs. Has mainly been wheelchair bound recently due to weakness, but at baseline patient is able to stand and walk short distances. Patient uses 2L NC at home and noticed her SpO2 at 75% on RA with ambulation. Patient was also switched to Oxcarbazepine 2-3 months ago and noticed her symptoms of anxiety, weakness, and "memory" have worsened.  Of note, at night when patient takes her Oxcarbazepine, she starts yelling and having "hallucinations."     In the ED: temp 97.6, HR 97, /80, RR 15, SpO2 92% RA, 98% on 3L NC. WBC 11.10. UA: moderate LEC, WBC 26-50, moderate bacteria. Chest x-ray: Interval enlargement of a right-sided pneumothorax. Small bilateral pleural effusions. Chronic lung fibrotic changes. CT chest/abd/pelvis: Moderate-sized right pneumothorax. Worsening groundglass opacities in both lower lobes, possibly infection. Stercoral colitis. Indeterminant hepatic lesions, possibly metastatic disease. Received IV Rocephin, IV Flagyl, 1L NS Bolus. EKG: NSR 88    Of note,  wants us to check oxcarbazepine levels. (26 Dec 2019 21:14)      ----  INTERVAL HPI/OVERNIGHT EVENTS: Pt seen and evaluated at the bedside in the ER. No acute overnight events occurred. Patient denies fever, chills, abdominal pain or vomiting. Nausea has improved with zofran. Reports a large bowel movement late this morning. Reports weight loss of 5-6 lbs over the past month.     ----  PAST MEDICAL & SURGICAL HISTORY:  Interstitial lung disease: on home o2 prn  NHL (non-Hodgkin's lymphoma): Agem 45 sp chemo/rt/stem cell  Transient cerebral ischemia, unspecified type  Mitral prolapse  History of tonsillectomy  History of appendectomy      FAMILY HISTORY:  Family history of stroke  Family history of breast cancer: Mother      Allergies    IV Contrast (Anaphylaxis)  shellfish. (Anaphylaxis)    Intolerances        ----  REVIEW OF SYSTEMS:  CONSTITUTIONAL: admits weight loss; denies fever, chills, fatigue, weakness  HEENT: denies blurred vision, sore throat  SKIN: denies new lesions, rash  CARDIOVASCULAR: denies chest pain, chest pressure, palpitations  RESPIRATORY: denies shortness of breath, sputum production  GASTROINTESTINAL: admits nausea, loose stools; denies vomiting, abdominal pain  GENITOURINARY: denies dysuria, discharge  NEUROLOGICAL: denies numbness, headache, focal weakness  MUSCULOSKELETAL: denies new joint pain, muscle aches  HEMATOLOGIC: denies gross bleeding, bruising  LYMPHATICS: denies enlarged lymph nodes, extremity swelling  PSYCHIATRIC: denies recent changes in anxiety, depression  ENDOCRINOLOGIC: denies sweating, cold or heat intolerance      ----  PHYSICAL EXAM:  GENERAL: patient appears well, no acute distress, appropriately interactive  EYES: sclera clear, no exudates  ENMT: oropharynx clear without erythema, moist mucous membranes  NECK: supple, soft, no thyromegaly noted  LUNGS: good air entry bilaterally, clear to auscultation, symmetric breath sounds, no wheezing or rhonchi appreciated  HEART: soft S1/S2, regular rate and rhythm, no murmurs noted, no noted edema to b/l LE  GASTROINTESTINAL: abdomen is soft, nontender, nondistended, decreased bowel sounds, no palpable masses  INTEGUMENT: good skin turgor, appropriate for ethnicity, appears well perfused, no jaundice noted  MUSCULOSKELETAL: no clubbing or cyanosis, no obvious deformity  NEUROLOGIC: awake, alert, oriented x3, good muscle tone in 4 extremities, no obvious sensory deficits  PSYCHIATRIC: mood is good, affect is congruent with mood, linear and logical thought process    T(C): 36.7 (19 @ 09:14), Max: 37.2 (19 @ 19:30)  HR: 98 (19 @ 09:14) (73 - 100)  BP: 106/71 (19 @ 09:14) (106/71 - 133/67)  RR: 18 (19 @ 09:14) (15 - 20)  SpO2: 96% (19 @ 09:14) (92% - 98%)  Wt(kg): --    ----  I&O's Summary    26 Dec 2019 07:01  -  27 Dec 2019 07:00  --------------------------------------------------------  IN: 700 mL / OUT: 0 mL / NET: 700 mL        LABS:                        11.2   8.07  )-----------( 426      ( 27 Dec 2019 06:16 )             35.5         144  |  106  |  11  ----------------------------<  111<H>  3.1<L>   |  32<H>  |  0.60    Ca    8.7      27 Dec 2019 06:16    TPro  6.1  /  Alb  2.5<L>  /  TBili  0.3  /  DBili  x   /  AST  21  /  ALT  27  /  AlkPhos  104        Urinalysis Basic - ( 26 Dec 2019 17:21 )    Color: Yellow / Appearance: Turbid / S.020 / pH: x  Gluc: x / Ketone: Trace  / Bili: Moderate / Urobili: 4   Blood: x / Protein: 75 mg/dL / Nitrite: Negative   Leuk Esterase: Moderate / RBC: 6-10 /HPF / WBC 26-50   Sq Epi: x / Non Sq Epi: Moderate / Bacteria: Moderate      CAPILLARY BLOOD GLUCOSE                    ----

## 2019-12-27 NOTE — CONSULT NOTE ADULT - SUBJECTIVE AND OBJECTIVE BOX
Chief Complaint:  Patient is a 58 y/o female who presents with a chief complaint of pneumothorax, colitis, and UTI.     HPI:  58 y/o F with PMHs of IBS-Diarrhea predominant, ILD (on 2L NC at home), PTX, pulmonary nodules, Meniere's disease, Non-Hodgkin's lymphoma (SCD/XRT/chemo at [facility] 2003), CVA 3/2019 w/residual L sided weakness, unable to use her L arm), not on ASA or Plavix due to GIB, seizure disorder, MVP, hx of chronic SDH who presents with weakness, nausea, and diarrhea for the past week. Patient states that loose stools started one week ago, non-bloody, non-mucus containing, cannot remember how many episodes per day. Admits to a history of diarrhea-predominant IBS, states episodes similar to this have happened in the past and resolved on their own. States episodes are frequently participated by eating certain foods including tomato sauce and dairy. Does not recall eating anything specific which precipitated this episode. Admits to associated nausea present, denies any vomiting. States that zofran helps her nausea, was given prescription by PCP last week, states IV Zofran helps nausea more. Denies any abdominal pain. Denies any recent abx use, recent travel, sick contacts. Outpatient GI is Dr. Kidd at [place], last EGD and colonoscopy were in 2018 - no significant findings as per patient. Admits to associated weakness and increasing tiredness over past week, minor weight loss over past week. Denies fever, chills.       Allergies:  IV Contrast (Anaphylaxis)  Shellfish (Anaphylaxis)    Current Medications:  cefTRIAXone   IVPB 1000 milliGRAM(s) IV Intermittent every 24 hours  enoxaparin Injectable 40 milliGRAM(s) SubCutaneous daily  lactobacillus acidophilus 1 Tablet(s) Oral three times a day with meals  ondansetron Injectable 4 milliGRAM(s) IV Push every 8 hours PRN  OXcarbazepine 300 milliGRAM(s) Oral two times a day  pantoprazole    Tablet 40 milliGRAM(s) Oral before breakfast  propranolol 20 milliGRAM(s) Oral three times a day  senna 2 Tablet(s) Oral at bedtime  tiotropium 18 MICROgram(s) Capsule 1 Capsule(s) Inhalation daily    Review of Systems:  General: Admits to 4lb wt loss; Denies fevers, chills, night sweats  ENT:  No dysphagia  CV:  No pain, palpitations hypo/hypertension  Resp:  No dyspnea, cough, tachypnea, wheezing  GI: Admits to loose stools and nausea; Denies vomiting, bloody stools, reflux symptoms  :  No pain, bleeding, incontinence, nocturia  Neuro:  Admits to weakness; No tingling, memory problems  Psych:  Admits to tiredness; No insomnia, mood problems, depression  Endocrine:  No polyuria, polydypsia, cold/heat intolerance  Heme:  No petechiae, ecchymosis, easy bruisability    Relevant Family History: Hx of stroke in parents. Hx breast CA in mother.    Relevant Social History: Admits to 20 pack year hx of tobacco use. Denies alcohol or illicit drug use.       Physical Exam:    Vital Signs:  Vital Signs Last 24 Hrs  T(C): 36.7 (27 Dec 2019 09:14), Max: 37.2 (26 Dec 2019 19:30)  T(F): 98.1 (27 Dec 2019 09:14), Max: 98.9 (26 Dec 2019 19:30)  HR: 98 (27 Dec 2019 09:14) (73 - 100)  BP: 106/71 (27 Dec 2019 09:14) (106/71 - 133/67)  RR: 18 (27 Dec 2019 09:14) (15 - 20)  SpO2: 96% (27 Dec 2019 09:14) (92% - 98%)  Daily Height in cm: 175.26 (26 Dec 2019 14:30)      General:  Thin female, lying comfortably, no distress  HEENT:  NC/AT, conjunctivae clear and pink, no thyromegaly, nodules, adenopathy, no JVD  Chest: +NC, full & symmetric excursion, no increased effort, breath sounds clear  Cardiovascular:  Regular rhythm, S1, S2  Abdomen:  Soft, non-tender, non-distended, decreased bowel sounds   Extremities:  No b/l pitting edema  Skin:  No rash noted  Neuro/Psych:  Alert, oriented x4, somnolent     Laboratory:                        11.2   8.07  )-----------( 426      ( 27 Dec 2019 06:16 )             35.5     12    144  |  106  |  11  ----------------------------<  111<H>  3.1<L>   |  32<H>  |  0.60    Ca    8.7      27 Dec 2019 06:16    TPro  6.1  /  Alb  2.5<L>  /  TBili  0.3  /  DBili  x   /  AST  21  /  ALT  27  /  AlkPhos  104  12-27    LIVER FUNCTIONS - ( 27 Dec 2019 06:16 )  Alb: 2.5 g/dL / Pro: 6.1 g/dL / ALK PHOS: 104 U/L / ALT: 27 U/L / AST: 21 U/L / GGT: x             Urinalysis Basic - ( 26 Dec 2019 17:21 )    Color: Yellow / Appearance: Turbid / S.020 / pH: x  Gluc: x / Ketone: Trace  / Bili: Moderate / Urobili: 4   Blood: x / Protein: 75 mg/dL / Nitrite: Negative   Leuk Esterase: Moderate / RBC: 6-10 /HPF / WBC 26-50   Sq Epi: x / Non Sq Epi: Moderate / Bacteria: Moderate        Lipase serum 93     Imaging:  < from: CT Abdomen and Pelvis No Cont (.26.19 @ 18:09) >    EXAM:  CT ABDOMEN AND PELVIS                          EXAM:  CT CHEST                            PROCEDURE DATE:  2019          INTERPRETATION:  CLINICAL INFORMATION: Diarrhea. Lower abdominal pain.    COMPARISON: CT abdomen and pelvis from 2019 and CT chest from 2018.    PROCEDURE:   CT of the Chest, Abdomen and Pelvis was performed without intravenous contrast.   Intravenous contrast: None.  Oral contrast: None.  Sagittal and coronal reformats were performed.    FINDINGS:    CHEST:     LUNGS AND LARGE AIRWAYS: Bilateral bronchiectasis. Worsening groundglass opacities in the right lower lobe and left lower lobe. Unchanged bilateral subpleural reticular opacities.  PLEURA: Moderate sized right pneumothorax.  VESSELS: Within normal limits.  HEART: Heart size is normal. Small pericardial effusion.  MEDIASTINUM AND IRMA: No lymphadenopathy.  CHEST WALL AND LOWER NECK: Within normal limits.    ABDOMEN AND PELVIS:    LIVER: Indeterminate hepatic lesions. For example:  *  Segment 6 (series 2, image 82), measuring 2.2 cm segment 4A (series 2, image 59), measuring 1.8 cm  RIGHT hepatic cyst. Subcentimeter hypodense hepatic lesions, too small to characterize.  BILE DUCTS: Normal caliber.  GALLBLADDER: Within normal limits.  SPLEEN: Within normal limits.  PANCREAS: Within normal limits.  ADRENALS: Within normal limits.  KIDNEYS/URETERS: Within normal limits.    BLADDER: Within normal limits.  REPRODUCTIVE ORGANS: Calcified uterine leiomyoma.    BOWEL: No bowel obstruction. Large amount of stool within the rectum with surrounding inflammatory change.  PERITONEUM: No ascites.  VESSELS: Atherosclerotic changes.  RETROPERITONEUM/LYMPH NODES: No lymphadenopathy.    ABDOMINAL WALL: Within normal limits.  BONES:Degenerative changes. Unchanged sclerosis of the right iliac bone.    IMPRESSION:     Moderate-sized right pneumothorax.    Worsening groundglass opacities in both lower lobes, possibly infection.    Stercoral colitis.    Indeterminant hepatic lesions, possibly metastatic disease.                RICHIE BANDA M.D., ATTENDING RADIOLOGIST  This document has been electronically signed. Dec 26 2019  7:17PM

## 2019-12-27 NOTE — PROGRESS NOTE ADULT - PROBLEM SELECTOR PLAN 6
chronic  -Continue Oxcarbazepine BID  -Follow up Oxcarbazepine level  -Per , patient sometimes "appears altered - starts yelling in her sleep and hallucinating when she received her night dose" Patient started this medication 2-3 months ago chronic  - Continue Oxcarbazepine BID  - Follow up Oxcarbazepine level  - Per , patient sometimes "appears altered - starts yelling in her sleep and hallucinating when she received her night dose" Patient started this medication 2-3 months ago chronic  - Continue Oxcarbazepine BID  - Follow up Oxcarbazepine level  - Per , patient sometimes "appears altered - starts yelling in her sleep and hallucinating when she received her night dose" Patient started this medication 2-3 months ago, will monitor behaviour while patient is here

## 2019-12-27 NOTE — PROGRESS NOTE ADULT - SUBJECTIVE AND OBJECTIVE BOX
Date/Time Patient Seen:  		  Referring MD:   Data Reviewed	       Patient is a 59y old  Female who presents with a chief complaint of uti (26 Dec 2019 21:50)      Subjective/HPI     PAST MEDICAL & SURGICAL HISTORY:  Interstitial lung disease: on home o2 prn  NHL (non-Hodgkin's lymphoma): Agem 43 sp chemo/rt/stem cell  Transient cerebral ischemia, unspecified type  Mitral prolapse  Pulmonary disease  History of tonsillectomy  History of appendectomy        Medication list         MEDICATIONS  (STANDING):  cefTRIAXone   IVPB 1000 milliGRAM(s) IV Intermittent every 24 hours  enoxaparin Injectable 40 milliGRAM(s) SubCutaneous daily  lactobacillus acidophilus 1 Tablet(s) Oral three times a day with meals  metroNIDAZOLE  IVPB 500 milliGRAM(s) IV Intermittent every 8 hours  OXcarbazepine 300 milliGRAM(s) Oral two times a day  pantoprazole    Tablet 40 milliGRAM(s) Oral before breakfast  propranolol 20 milliGRAM(s) Oral three times a day  senna 2 Tablet(s) Oral at bedtime  sodium chloride 0.9%. 1000 milliLiter(s) (75 mL/Hr) IV Continuous <Continuous>  tiotropium 18 MICROgram(s) Capsule 1 Capsule(s) Inhalation daily    MEDICATIONS  (PRN):  ondansetron Injectable 4 milliGRAM(s) IV Push every 8 hours PRN Nausea and/or Vomiting         Vitals log        ICU Vital Signs Last 24 Hrs  T(C): 37 (27 Dec 2019 04:36), Max: 37.2 (26 Dec 2019 19:30)  T(F): 98.6 (27 Dec 2019 04:36), Max: 98.9 (26 Dec 2019 19:30)  HR: 73 (27 Dec 2019 04:36) (73 - 100)  BP: 124/63 (27 Dec 2019 04:36) (107/73 - 133/67)  BP(mean): --  ABP: --  ABP(mean): --  RR: 16 (27 Dec 2019 04:36) (15 - 20)  SpO2: 94% (27 Dec 2019 04:36) (92% - 98%)           Input and Output:  I&O's Detail    26 Dec 2019 07:01  -  27 Dec 2019 06:46  --------------------------------------------------------  IN:    sodium chloride 0.9%.: 450 mL  Total IN: 450 mL    OUT:  Total OUT: 0 mL    Total NET: 450 mL          Lab Data                        11.2   8.07  )-----------( 426      ( 27 Dec 2019 06:16 )             35.5     12-27    144  |  106  |  11  ----------------------------<  111<H>  3.1<L>   |  32<H>  |  0.60    Ca    8.7      27 Dec 2019 06:16    TPro  6.1  /  Alb  2.5<L>  /  TBili  0.3  /  DBili  x   /  AST  21  /  ALT  27  /  AlkPhos  104  12-27            Review of Systems	      Objective     Physical Examination    head at  heart s1s2  lung dec BS  abd soft  head nc      Pertinent Lab findings & Imaging      Shauna:  NO   Adequate UO     I&O's Detail    26 Dec 2019 07:01  -  27 Dec 2019 06:46  --------------------------------------------------------  IN:    sodium chloride 0.9%.: 450 mL  Total IN: 450 mL    OUT:  Total OUT: 0 mL    Total NET: 450 mL               Discussed with:     Cultures:	        Radiology

## 2019-12-27 NOTE — DIETITIAN INITIAL EVALUATION ADULT. - PROBLEM SELECTOR PLAN 4
patient reports chronic nausea for the past month. Had an EGD/Colonoscopy this year that was "negative" per patient  -Zofran IV PRN for nausea  -Nutrition consult  -GI, Dr. De La Vega, consulted

## 2019-12-27 NOTE — PROGRESS NOTE ADULT - PROBLEM SELECTOR PLAN 1
CT chest: Moderate-sized right pneumothorax.   -Admit to tele  -Patient Spo2 98% on 2L NC, able to speak full sentences, will continue to monitor  -PulDr. Gay perez, evaluated patient in the ED  -Continue Spiriva (home medication Incruse Ellipta) CT chest: Moderate-sized right pneumothorax.   - Admit to tele  - Patient Spo2 98% on 2L NC, able to speak full sentences, will continue to monitor  - PulDr. Gay perez, evaluated patient. Recommends SpO2 >88%, incentive spirometry  - Continue Spiriva (home medication Incruse Ellipta)  - repeat chest x-ray today showed "stable chest showing chronic Interstitial lung disease and a 15% right apical Pneumothorax"  - no urgency with possible chest tube placement per pulmonary  - Will consult Cardiothoracic team CT chest: Moderate-sized right pneumothorax.   - Patient Spo2 98% on 2L NC, able to speak full sentences, will continue to monitor  - Pulm, Dr. Rashid, evaluated patient. Recommends SpO2 >88%, incentive spirometry  - Continue Spiriva (home medication Incruse Ellipta)  - repeat chest x-ray today showed "stable chest showing chronic Interstitial lung disease and a 15% right apical Pneumothorax"  - no urgency with possible chest tube placement per pulmonary, will make IR aware CT chest: Moderate-sized right pneumothorax.   - Patient Spo2 98% on 2L NC, able to speak full sentences, will continue to monitor  - Pulm, Dr. Rashid, evaluated patient. Recommends SpO2 >88%, incentive spirometry  - Continue Spiriva (home medication Incruse Ellipta)  - repeat chest x-ray today showed "stable chest showing chronic Interstitial lung disease and a 15% right apical Pneumothorax"  - will perform daily xrays to evaluate if pneumothorax is progressing  - no urgency with possible chest tube placement per pulmonary  - Cardiothoracic surgeon consulted

## 2019-12-27 NOTE — PROGRESS NOTE ADULT - PROBLEM SELECTOR PLAN 5
chronic, stable  -Continue Propranolol TID with hold parameters  -Monitor vitals chronic, stable  - Continue Propranolol TID with hold parameters  - Monitor vitals

## 2019-12-28 DIAGNOSIS — Z71.89 OTHER SPECIFIED COUNSELING: ICD-10-CM

## 2019-12-28 LAB
ANION GAP SERPL CALC-SCNC: 4 MMOL/L — LOW (ref 5–17)
BUN SERPL-MCNC: 10 MG/DL — SIGNIFICANT CHANGE UP (ref 7–23)
CALCIUM SERPL-MCNC: 8.9 MG/DL — SIGNIFICANT CHANGE UP (ref 8.5–10.1)
CHLORIDE SERPL-SCNC: 108 MMOL/L — SIGNIFICANT CHANGE UP (ref 96–108)
CO2 SERPL-SCNC: 33 MMOL/L — HIGH (ref 22–31)
CREAT SERPL-MCNC: 0.49 MG/DL — LOW (ref 0.5–1.3)
CULTURE RESULTS: SIGNIFICANT CHANGE UP
GLUCOSE SERPL-MCNC: 99 MG/DL — SIGNIFICANT CHANGE UP (ref 70–99)
HCT VFR BLD CALC: 33.8 % — LOW (ref 34.5–45)
HGB BLD-MCNC: 10.5 G/DL — LOW (ref 11.5–15.5)
MCHC RBC-ENTMCNC: 28.9 PG — SIGNIFICANT CHANGE UP (ref 27–34)
MCHC RBC-ENTMCNC: 31.1 GM/DL — LOW (ref 32–36)
MCV RBC AUTO: 93.1 FL — SIGNIFICANT CHANGE UP (ref 80–100)
NRBC # BLD: 0 /100 WBCS — SIGNIFICANT CHANGE UP (ref 0–0)
PLATELET # BLD AUTO: 375 K/UL — SIGNIFICANT CHANGE UP (ref 150–400)
POTASSIUM SERPL-MCNC: 4 MMOL/L — SIGNIFICANT CHANGE UP (ref 3.5–5.3)
POTASSIUM SERPL-SCNC: 4 MMOL/L — SIGNIFICANT CHANGE UP (ref 3.5–5.3)
RBC # BLD: 3.63 M/UL — LOW (ref 3.8–5.2)
RBC # FLD: 15 % — HIGH (ref 10.3–14.5)
SODIUM SERPL-SCNC: 145 MMOL/L — SIGNIFICANT CHANGE UP (ref 135–145)
SPECIMEN SOURCE: SIGNIFICANT CHANGE UP
WBC # BLD: 7.11 K/UL — SIGNIFICANT CHANGE UP (ref 3.8–10.5)
WBC # FLD AUTO: 7.11 K/UL — SIGNIFICANT CHANGE UP (ref 3.8–10.5)

## 2019-12-28 PROCEDURE — 99233 SBSQ HOSP IP/OBS HIGH 50: CPT

## 2019-12-28 PROCEDURE — 71045 X-RAY EXAM CHEST 1 VIEW: CPT | Mod: 26

## 2019-12-28 RX ORDER — ACETAMINOPHEN 500 MG
650 TABLET ORAL ONCE
Refills: 0 | Status: COMPLETED | OUTPATIENT
Start: 2019-12-28 | End: 2019-12-28

## 2019-12-28 RX ORDER — ACETAMINOPHEN 500 MG
650 TABLET ORAL EVERY 6 HOURS
Refills: 0 | Status: DISCONTINUED | OUTPATIENT
Start: 2019-12-28 | End: 2020-01-02

## 2019-12-28 RX ADMIN — OXCARBAZEPINE 300 MILLIGRAM(S): 300 TABLET, FILM COATED ORAL at 09:06

## 2019-12-28 RX ADMIN — Medication 1 TABLET(S): at 09:07

## 2019-12-28 RX ADMIN — TIOTROPIUM BROMIDE 1 CAPSULE(S): 18 CAPSULE ORAL; RESPIRATORY (INHALATION) at 11:55

## 2019-12-28 RX ADMIN — CEFTRIAXONE 100 MILLIGRAM(S): 500 INJECTION, POWDER, FOR SOLUTION INTRAMUSCULAR; INTRAVENOUS at 18:08

## 2019-12-28 RX ADMIN — Medication 1 TABLET(S): at 18:08

## 2019-12-28 RX ADMIN — Medication 650 MILLIGRAM(S): at 05:15

## 2019-12-28 RX ADMIN — PANTOPRAZOLE SODIUM 40 MILLIGRAM(S): 20 TABLET, DELAYED RELEASE ORAL at 05:15

## 2019-12-28 RX ADMIN — ENOXAPARIN SODIUM 40 MILLIGRAM(S): 100 INJECTION SUBCUTANEOUS at 11:54

## 2019-12-28 RX ADMIN — Medication 650 MILLIGRAM(S): at 14:55

## 2019-12-28 RX ADMIN — ONDANSETRON 4 MILLIGRAM(S): 8 TABLET, FILM COATED ORAL at 09:07

## 2019-12-28 RX ADMIN — Medication 650 MILLIGRAM(S): at 05:45

## 2019-12-28 RX ADMIN — Medication 1 TABLET(S): at 11:55

## 2019-12-28 RX ADMIN — Medication 650 MILLIGRAM(S): at 13:55

## 2019-12-28 RX ADMIN — OXCARBAZEPINE 300 MILLIGRAM(S): 300 TABLET, FILM COATED ORAL at 21:34

## 2019-12-28 NOTE — PROGRESS NOTE ADULT - PROBLEM SELECTOR PLAN 2
UA: moderate LEC, WBC 26-50, moderate bacteria. Patient reports foul smell, currently using diapers due to feeling weak  -s/p IV Rocephin in the ED. Continue IV Rocephin  -Follow up urine culture UA: moderate LEC, WBC 26-50, moderate bacteria. Patient reports foul smell, currently using diapers due to feeling weak  -s/p IV Rocephin in the ED. Continue IV Rocephin finish today, 3 day course  -Follow up urine culture

## 2019-12-28 NOTE — PROGRESS NOTE ADULT - PROBLEM SELECTOR PLAN 1
ct showing stercoral colitis  prior diarrhea likely 2/2 hx ibs-d vs overflow  on abx for uti  cont bacid  cont senna  cont ppi, anti emetics prn  diet as tolerated  monitor exam/gi fxn  monitor cbc, transfuse prn  up to date w colonoscopy

## 2019-12-28 NOTE — PROGRESS NOTE ADULT - PROBLEM SELECTOR PLAN 1
urine cx pending, on ABX for poss UTI  on o2 support - assess room air sat  CXR and labs and CT chest reviewed  thoracic surgery eval noted  no resp distress, HD stable, no tachypnea, RR and Sat noted  would rec - use of I jesus  on Spiriva  ILD and chronic lung disease - PTX likely acute on chronic in nature  will need follow up with Dr. Bettye Hazel and thoracic in the future as outpatient  no need for therapeutic chest tube at present  cont to monitor vs and HD and Sat

## 2019-12-28 NOTE — PROGRESS NOTE ADULT - PROBLEM SELECTOR PLAN 3
CT abd/pelvis: Large amount of stool within the rectum with surrounding inflammatory change. Stercoral colitis.  Likely overflow diarrhea   -s/p IV Flagyl in the ED. Continue IV Flagyl   -Start Senna at bedtime  -Bacid TID  -Consider Mesalamine suppository  GI consulted. CT abd/pelvis: Large amount of stool within the rectum with surrounding inflammatory change. Stercoral colitis.  Likely overflow diarrhea   -s/p IV Flagyl in the ED. Continue IV Flagyl for 2 more days  -Start Senna at bedtime  -Bacid TID  -Consider Mesalamine suppository  GI consulted.

## 2019-12-28 NOTE — PROGRESS NOTE ADULT - SUBJECTIVE AND OBJECTIVE BOX
INTERVAL HPI/OVERNIGHT EVENTS:  pt seen and examined  denies n/v/d/c/abd pain  per overnight rn passed soft bm    MEDICATIONS  (STANDING):  cefTRIAXone   IVPB 1000 milliGRAM(s) IV Intermittent every 24 hours  enoxaparin Injectable 40 milliGRAM(s) SubCutaneous daily  influenza   Vaccine 0.5 milliLiter(s) IntraMuscular once  lactobacillus acidophilus 1 Tablet(s) Oral three times a day with meals  OXcarbazepine 300 milliGRAM(s) Oral two times a day  pantoprazole    Tablet 40 milliGRAM(s) Oral before breakfast  propranolol 20 milliGRAM(s) Oral three times a day  senna 2 Tablet(s) Oral at bedtime  tiotropium 18 MICROgram(s) Capsule 1 Capsule(s) Inhalation daily    MEDICATIONS  (PRN):  ondansetron Injectable 4 milliGRAM(s) IV Push every 8 hours PRN Nausea and/or Vomiting      Allergies    IV Contrast (Anaphylaxis)  shellfish. (Anaphylaxis)    Intolerances        Review of Systems:    General:  No wt loss, fevers, chills, night sweats, fatigue   Eyes:  Good vision, no reported pain  ENT:  No sore throat, pain, runny nose, dysphagia  CV:  No pain, palpitations, hypo/hypertension  Resp:  No dyspnea, cough, tachypnea, wheezing  GI:  No pain, No nausea, No vomiting, No diarrhea, No constipation, No weight loss, No fever, No pruritis, No rectal bleeding, No melena, No dysphagia  :  No pain, bleeding, incontinence, nocturia  Muscle:  No pain, weakness  Neuro:  No weakness, tingling, memory problems  Psych:  No fatigue, insomnia, mood problems, depression  Endocrine:  No polyuria, polydypsia, cold/heat intolerance  Heme:  No petechiae, ecchymosis, easy bruisability  Skin:  No rash, tattoos, scars, edema      Vital Signs Last 24 Hrs  T(C): 36.6 (28 Dec 2019 07:00), Max: 37 (27 Dec 2019 20:45)  T(F): 97.9 (28 Dec 2019 07:00), Max: 98.6 (27 Dec 2019 20:45)  HR: 70 (28 Dec 2019 07:00) (70 - 98)  BP: 93/62 (28 Dec 2019 07:00) (87/56 - 112/74)  BP(mean): --  RR: 16 (28 Dec 2019 07:00) (16 - 18)  SpO2: 98% (28 Dec 2019 07:00) (92% - 100%)    PHYSICAL EXAM:    Constitutional: lying in bed  HEENT: ncat  Neck: No LAD  Gastrointestinal: soft nt nd  Extremities: No peripheral edema  Neurological: Awake alert responds appropriately      LABS:                        10.5   7.11  )-----------( 375      ( 28 Dec 2019 08:00 )             33.8     12-    145  |  108  |  10  ----------------------------<  99  4.0   |  33<H>  |  0.49<L>    Ca    8.9      28 Dec 2019 08:00    TPro  6.1  /  Alb  2.5<L>  /  TBili  0.3  /  DBili  x   /  AST  21  /  ALT  27  /  AlkPhos  104  12-27      Urinalysis Basic - ( 26 Dec 2019 17:21 )    Color: Yellow / Appearance: Turbid / S.020 / pH: x  Gluc: x / Ketone: Trace  / Bili: Moderate / Urobili: 4   Blood: x / Protein: 75 mg/dL / Nitrite: Negative   Leuk Esterase: Moderate / RBC: 6-10 /HPF / WBC 26-50   Sq Epi: x / Non Sq Epi: Moderate / Bacteria: Moderate        RADIOLOGY & ADDITIONAL TESTS:

## 2019-12-28 NOTE — PROGRESS NOTE ADULT - PROBLEM SELECTOR PLAN 1
CT chest: Moderate-sized right pneumothorax.   -Admit to tele  -Patient Spo2 98% on 2L NC, able to speak full sentences, will continue to monitor  -PulDr. Gay perez, evaluated patient in the ED  -Continue Spiriva (home medication Incruse Ellipta) mod R   apprec ct surg dr patel, serial cxr   apprec pulm recs cont incentive spirometry and moitor as above

## 2019-12-28 NOTE — PROGRESS NOTE ADULT - ASSESSMENT
59F with PMH of interstitial lung disease hx of pneumothorax (on 2L NC at home), hx pulmonary nodules, Meniere's disease, Non-Hodgkin's lymphoma (SCD/XRT/chemo at [facility] 2003), hx of CVA (3/2019 - residual L sided weakness, unable to use her L arm), not on ASA or Plavix due to GIB, Seizure disorder, tachycardia, MVP, hx of chronic SDH, presents with weakness, weight loss, nausea, and diarrhea admitted for R pneumothorax, UTI, colitis. improving, serial xrays as per CT sx

## 2019-12-28 NOTE — PROGRESS NOTE ADULT - SUBJECTIVE AND OBJECTIVE BOX
Patient is a 59y old  Female who presents with a chief complaint of pneumothorax, colitis, UTI (28 Dec 2019 09:03)      INTERVAL HPI:  OVERNIGHT EVENTS:  T(F): 97.6 (12-28-19 @ 23:12), Max: 97.9 (12-28-19 @ 07:00)  HR: 81 (12-28-19 @ 23:12) (70 - 87)  BP: 108/72 (12-28-19 @ 23:12) (93/62 - 197/62)  RR: 18 (12-28-19 @ 23:12) (16 - 19)  SpO2: 97% (12-28-19 @ 23:12) (97% - 100%)  I&O's Summary    27 Dec 2019 07:01  -  28 Dec 2019 07:00  --------------------------------------------------------  IN: 925 mL / OUT: 250 mL / NET: 675 mL        REVIEW OF SYSTEMS:  CONSTITUTIONAL: No fever, weight loss, or fatigue  EYES: No eye pain, visual disturbances, or discharge  ENMT:  No difficulty hearing, tinnitus, vertigo; No sinus or throat pain  NECK: No pain or stiffness  BREASTS: No pain, masses, or nipple discharge  RESPIRATORY: No cough, wheezing, chills or hemoptysis; No shortness of breath  CARDIOVASCULAR: No chest pain, palpitations, dizziness, or leg swelling  GASTROINTESTINAL: No abdominal or epigastric pain. No nausea, vomiting, or hematemesis; No diarrhea or constipation. No melena or hematochezia.  GENITOURINARY: No dysuria, frequency, hematuria, or incontinence  NEUROLOGICAL: No headaches, memory loss, loss of strength, numbness, or tremors  SKIN: No itching, burning, rashes, or lesions   LYMPH NODES: No enlarged glands  ENDOCRINE: No heat or cold intolerance; No hair loss  MUSCULOSKELETAL: No joint pain or swelling; No muscle, back, or extremity pain  PSYCHIATRIC: No depression, anxiety, mood swings, or difficulty sleeping  HEME/LYMPH: No easy bruising, or bleeding gums  ALLERY AND IMMUNOLOGIC: No hives or eczema    PHYSICAL EXAM:  GENERAL: NAD, well-groomed, well-developed  HEAD:  Atraumatic, Normocephalic  EYES: EOMI, PERRLA, conjunctiva and sclera clear  ENMT: No tonsillar erythema, exudates, or enlargement; Moist mucous membranes, Good dentition, No lesions  NECK: Supple, No JVD, Normal thyroid  NERVOUS SYSTEM:  Alert & Oriented X3, Good concentration; Motor Strength 5/5 B/L upper and lower extremities; DTRs 2+ intact and symmetric  CHEST/LUNG: Clear to percussion bilaterally; No rales, rhonchi, wheezing, or rubs  HEART: Regular rate and rhythm; No murmurs, rubs, or gallops  ABDOMEN: Soft, Nontender, Nondistended; Bowel sounds present  EXTREMITIES:  2+ Peripheral Pulses, No clubbing, cyanosis, or edema  LYMPH: No lymphadenopathy noted  SKIN: No rashes or lesions    LABS:                        10.5   7.11  )-----------( 375      ( 28 Dec 2019 08:00 )             33.8     12-28    145  |  108  |  10  ----------------------------<  99  4.0   |  33<H>  |  0.49<L>    Ca    8.9      28 Dec 2019 08:00    TPro  6.1  /  Alb  2.5<L>  /  TBili  0.3  /  DBili  x   /  AST  21  /  ALT  27  /  AlkPhos  104  12-27        CAPILLARY BLOOD GLUCOSE          12-27 @ 20:58   No enteric pathogens to date: Final culture pending  --  --  12-26 @ 22:26   No growth to date.  --  --          MEDICATIONS  (STANDING):  cefTRIAXone   IVPB 1000 milliGRAM(s) IV Intermittent every 24 hours  enoxaparin Injectable 40 milliGRAM(s) SubCutaneous daily  influenza   Vaccine 0.5 milliLiter(s) IntraMuscular once  lactobacillus acidophilus 1 Tablet(s) Oral three times a day with meals  OXcarbazepine 300 milliGRAM(s) Oral two times a day  pantoprazole    Tablet 40 milliGRAM(s) Oral before breakfast  propranolol 20 milliGRAM(s) Oral three times a day  senna 2 Tablet(s) Oral at bedtime  tiotropium 18 MICROgram(s) Capsule 1 Capsule(s) Inhalation daily    MEDICATIONS  (PRN):  acetaminophen   Tablet .. 650 milliGRAM(s) Oral every 6 hours PRN Temp greater or equal to 38C (100.4F), Moderate Pain (4 - 6)  ondansetron Injectable 4 milliGRAM(s) IV Push every 8 hours PRN Nausea and/or Vomiting Patient is a 59y old  Female who presents with a chief complaint of pneumothorax, colitis, UTI (28 Dec 2019 09:03)      INTERVAL HPI: Pt seen and examined. States she has a headache usually resolves with tylenol. Still has sob and chest discomfort with deep inspiration. Dneies any other acute compalints.     OVERNIGHT EVENTS: none noted  T(F): 97.6 (12-28-19 @ 23:12), Max: 97.9 (12-28-19 @ 07:00)  HR: 81 (12-28-19 @ 23:12) (70 - 87)  BP: 108/72 (12-28-19 @ 23:12) (93/62 - 197/62)  RR: 18 (12-28-19 @ 23:12) (16 - 19)  SpO2: 97% (12-28-19 @ 23:12) (97% - 100%)  I&O's Summary    27 Dec 2019 07:01  -  28 Dec 2019 07:00  --------------------------------------------------------  IN: 925 mL / OUT: 250 mL / NET: 675 mL        REVIEW OF SYSTEMS:  CONSTITUTIONAL: No fever, weight loss, or fatigue  RESPIRATORY: No cough, wheezing, chills or hemoptysis; No shortness of breath except as above  CARDIOVASCULAR: No chest pain, palpitations, dizziness, or leg swelling  GASTROINTESTINAL: No abdominal or epigastric pain. No nausea, vomiting, or hematemesis; No diarrhea or constipation. No melena or hematochezia.  GENITOURINARY: No dysuria, frequency, hematuria, or incontinence  NEUROLOGICAL: No headaches, memory loss, loss of strength, numbness, or tremors except as above  SKIN: No itching, burning, rashes, or lesions   MUSCULOSKELETAL: No joint pain or swelling; No muscle, back, or extremity pain  PSYCHIATRIC: No depression, anxiety, mood swings, or difficulty sleeping      PHYSICAL EXAM:  GENERAL: NAD, well-groomed, thin  NERVOUS SYSTEM:  Alert & Oriented X3, Motor Strength 4/5 B/L upper and lower extremities;   CHEST/LUNG: dimished R sided bs, mild wob  HEART: Regular rate and rhythm; No murmurs, rubs, or gallops  ABDOMEN: Soft, Nontender, Nondistended; Bowel sounds present  EXTREMITIES:  2+ Peripheral Pulses, No clubbing, cyanosis, or edema  SKIN: No rashes or lesions    LABS:                        10.5   7.11  )-----------( 375      ( 28 Dec 2019 08:00 )             33.8     12-28    145  |  108  |  10  ----------------------------<  99  4.0   |  33<H>  |  0.49<L>    Ca    8.9      28 Dec 2019 08:00    TPro  6.1  /  Alb  2.5<L>  /  TBili  0.3  /  DBili  x   /  AST  21  /  ALT  27  /  AlkPhos  104  12-27        CAPILLARY BLOOD GLUCOSE          12-27 @ 20:58   No enteric pathogens to date: Final culture pending  --  --  12-26 @ 22:26   No growth to date.  --  --          MEDICATIONS  (STANDING):  cefTRIAXone   IVPB 1000 milliGRAM(s) IV Intermittent every 24 hours  enoxaparin Injectable 40 milliGRAM(s) SubCutaneous daily  influenza   Vaccine 0.5 milliLiter(s) IntraMuscular once  lactobacillus acidophilus 1 Tablet(s) Oral three times a day with meals  OXcarbazepine 300 milliGRAM(s) Oral two times a day  pantoprazole    Tablet 40 milliGRAM(s) Oral before breakfast  propranolol 20 milliGRAM(s) Oral three times a day  senna 2 Tablet(s) Oral at bedtime  tiotropium 18 MICROgram(s) Capsule 1 Capsule(s) Inhalation daily    MEDICATIONS  (PRN):  acetaminophen   Tablet .. 650 milliGRAM(s) Oral every 6 hours PRN Temp greater or equal to 38C (100.4F), Moderate Pain (4 - 6)  ondansetron Injectable 4 milliGRAM(s) IV Push every 8 hours PRN Nausea and/or Vomiting

## 2019-12-29 DIAGNOSIS — L89.311 PRESSURE ULCER OF RIGHT BUTTOCK, STAGE 1: ICD-10-CM

## 2019-12-29 DIAGNOSIS — R19.7 DIARRHEA, UNSPECIFIED: ICD-10-CM

## 2019-12-29 DIAGNOSIS — R11.0 NAUSEA: ICD-10-CM

## 2019-12-29 DIAGNOSIS — R42 DIZZINESS AND GIDDINESS: ICD-10-CM

## 2019-12-29 DIAGNOSIS — G40.109 LOCALIZATION-RELATED (FOCAL) (PARTIAL) SYMPTOMATIC EPILEPSY AND EPILEPTIC SYNDROMES WITH SIMPLE PARTIAL SEIZURES, NOT INTRACTABLE, WITHOUT STATUS EPILEPTICUS: ICD-10-CM

## 2019-12-29 DIAGNOSIS — Z87.891 PERSONAL HISTORY OF NICOTINE DEPENDENCE: ICD-10-CM

## 2019-12-29 DIAGNOSIS — R63.0 ANOREXIA: ICD-10-CM

## 2019-12-29 DIAGNOSIS — Z85.72 PERSONAL HISTORY OF NON-HODGKIN LYMPHOMAS: ICD-10-CM

## 2019-12-29 DIAGNOSIS — Z87.820 PERSONAL HISTORY OF TRAUMATIC BRAIN INJURY: ICD-10-CM

## 2019-12-29 DIAGNOSIS — Z80.3 FAMILY HISTORY OF MALIGNANT NEOPLASM OF BREAST: ICD-10-CM

## 2019-12-29 DIAGNOSIS — Z82.3 FAMILY HISTORY OF STROKE: ICD-10-CM

## 2019-12-29 DIAGNOSIS — Z79.899 OTHER LONG TERM (CURRENT) DRUG THERAPY: ICD-10-CM

## 2019-12-29 DIAGNOSIS — L89.151 PRESSURE ULCER OF SACRAL REGION, STAGE 1: ICD-10-CM

## 2019-12-29 DIAGNOSIS — J84.9 INTERSTITIAL PULMONARY DISEASE, UNSPECIFIED: ICD-10-CM

## 2019-12-29 DIAGNOSIS — Z90.49 ACQUIRED ABSENCE OF OTHER SPECIFIED PARTS OF DIGESTIVE TRACT: ICD-10-CM

## 2019-12-29 DIAGNOSIS — Z91.013 ALLERGY TO SEAFOOD: ICD-10-CM

## 2019-12-29 DIAGNOSIS — Z91.041 RADIOGRAPHIC DYE ALLERGY STATUS: ICD-10-CM

## 2019-12-29 LAB
ALBUMIN SERPL ELPH-MCNC: 2.6 G/DL — LOW (ref 3.3–5)
ALP SERPL-CCNC: 106 U/L — SIGNIFICANT CHANGE UP (ref 40–120)
ALT FLD-CCNC: 19 U/L — SIGNIFICANT CHANGE UP (ref 12–78)
ANION GAP SERPL CALC-SCNC: 2 MMOL/L — LOW (ref 5–17)
AST SERPL-CCNC: 16 U/L — SIGNIFICANT CHANGE UP (ref 15–37)
BASOPHILS # BLD AUTO: 0.04 K/UL — SIGNIFICANT CHANGE UP (ref 0–0.2)
BASOPHILS NFR BLD AUTO: 0.4 % — SIGNIFICANT CHANGE UP (ref 0–2)
BILIRUB SERPL-MCNC: 0.3 MG/DL — SIGNIFICANT CHANGE UP (ref 0.2–1.2)
BUN SERPL-MCNC: 7 MG/DL — SIGNIFICANT CHANGE UP (ref 7–23)
CALCIUM SERPL-MCNC: 8.9 MG/DL — SIGNIFICANT CHANGE UP (ref 8.5–10.1)
CHLORIDE SERPL-SCNC: 100 MMOL/L — SIGNIFICANT CHANGE UP (ref 96–108)
CO2 SERPL-SCNC: 35 MMOL/L — HIGH (ref 22–31)
CREAT SERPL-MCNC: 0.5 MG/DL — SIGNIFICANT CHANGE UP (ref 0.5–1.3)
CULTURE RESULTS: SIGNIFICANT CHANGE UP
EOSINOPHIL # BLD AUTO: 0.23 K/UL — SIGNIFICANT CHANGE UP (ref 0–0.5)
EOSINOPHIL NFR BLD AUTO: 2.3 % — SIGNIFICANT CHANGE UP (ref 0–6)
GLUCOSE SERPL-MCNC: 108 MG/DL — HIGH (ref 70–99)
HCT VFR BLD CALC: 38.7 % — SIGNIFICANT CHANGE UP (ref 34.5–45)
HGB BLD-MCNC: 12.1 G/DL — SIGNIFICANT CHANGE UP (ref 11.5–15.5)
IMM GRANULOCYTES NFR BLD AUTO: 0.3 % — SIGNIFICANT CHANGE UP (ref 0–1.5)
LYMPHOCYTES # BLD AUTO: 1.26 K/UL — SIGNIFICANT CHANGE UP (ref 1–3.3)
LYMPHOCYTES # BLD AUTO: 12.9 % — LOW (ref 13–44)
MCHC RBC-ENTMCNC: 28.5 PG — SIGNIFICANT CHANGE UP (ref 27–34)
MCHC RBC-ENTMCNC: 31.3 GM/DL — LOW (ref 32–36)
MCV RBC AUTO: 91.3 FL — SIGNIFICANT CHANGE UP (ref 80–100)
MONOCYTES # BLD AUTO: 0.93 K/UL — HIGH (ref 0–0.9)
MONOCYTES NFR BLD AUTO: 9.5 % — SIGNIFICANT CHANGE UP (ref 2–14)
NEUTROPHILS # BLD AUTO: 7.31 K/UL — SIGNIFICANT CHANGE UP (ref 1.8–7.4)
NEUTROPHILS NFR BLD AUTO: 74.6 % — SIGNIFICANT CHANGE UP (ref 43–77)
NRBC # BLD: 0 /100 WBCS — SIGNIFICANT CHANGE UP (ref 0–0)
PLATELET # BLD AUTO: 442 K/UL — HIGH (ref 150–400)
POTASSIUM SERPL-MCNC: 3.9 MMOL/L — SIGNIFICANT CHANGE UP (ref 3.5–5.3)
POTASSIUM SERPL-SCNC: 3.9 MMOL/L — SIGNIFICANT CHANGE UP (ref 3.5–5.3)
PROCALCITONIN SERPL-MCNC: <0.05 — SIGNIFICANT CHANGE UP (ref 0–0.04)
PROT SERPL-MCNC: 6.5 G/DL — SIGNIFICANT CHANGE UP (ref 6–8.3)
RBC # BLD: 4.24 M/UL — SIGNIFICANT CHANGE UP (ref 3.8–5.2)
RBC # FLD: 14.6 % — HIGH (ref 10.3–14.5)
SODIUM SERPL-SCNC: 137 MMOL/L — SIGNIFICANT CHANGE UP (ref 135–145)
SPECIMEN SOURCE: SIGNIFICANT CHANGE UP
WBC # BLD: 9.8 K/UL — SIGNIFICANT CHANGE UP (ref 3.8–10.5)
WBC # FLD AUTO: 9.8 K/UL — SIGNIFICANT CHANGE UP (ref 3.8–10.5)

## 2019-12-29 PROCEDURE — 99233 SBSQ HOSP IP/OBS HIGH 50: CPT

## 2019-12-29 RX ADMIN — OXCARBAZEPINE 300 MILLIGRAM(S): 300 TABLET, FILM COATED ORAL at 08:30

## 2019-12-29 RX ADMIN — Medication 1 TABLET(S): at 08:28

## 2019-12-29 RX ADMIN — OXCARBAZEPINE 300 MILLIGRAM(S): 300 TABLET, FILM COATED ORAL at 21:15

## 2019-12-29 RX ADMIN — ONDANSETRON 4 MILLIGRAM(S): 8 TABLET, FILM COATED ORAL at 08:28

## 2019-12-29 RX ADMIN — Medication 650 MILLIGRAM(S): at 15:02

## 2019-12-29 RX ADMIN — ENOXAPARIN SODIUM 40 MILLIGRAM(S): 100 INJECTION SUBCUTANEOUS at 12:46

## 2019-12-29 RX ADMIN — TIOTROPIUM BROMIDE 1 CAPSULE(S): 18 CAPSULE ORAL; RESPIRATORY (INHALATION) at 12:46

## 2019-12-29 RX ADMIN — CEFTRIAXONE 100 MILLIGRAM(S): 500 INJECTION, POWDER, FOR SOLUTION INTRAMUSCULAR; INTRAVENOUS at 17:39

## 2019-12-29 RX ADMIN — Medication 650 MILLIGRAM(S): at 14:02

## 2019-12-29 RX ADMIN — PANTOPRAZOLE SODIUM 40 MILLIGRAM(S): 20 TABLET, DELAYED RELEASE ORAL at 06:52

## 2019-12-29 NOTE — PROGRESS NOTE ADULT - SUBJECTIVE AND OBJECTIVE BOX
Date/Time Patient Seen:  		  Referring MD:   Data Reviewed	       Patient is a 59y old  Female who presents with a chief complaint of pneumothorax, colitis, UTI (29 Dec 2019 07:35)      Subjective/HPI     PAST MEDICAL & SURGICAL HISTORY:  Interstitial lung disease: on home o2 prn  NHL (non-Hodgkin's lymphoma): Agem 43 sp chemo/rt/stem cell  Transient cerebral ischemia, unspecified type  Mitral prolapse  Pulmonary disease  History of tonsillectomy  History of appendectomy        Medication list         MEDICATIONS  (STANDING):  cefTRIAXone   IVPB 1000 milliGRAM(s) IV Intermittent every 24 hours  enoxaparin Injectable 40 milliGRAM(s) SubCutaneous daily  influenza   Vaccine 0.5 milliLiter(s) IntraMuscular once  lactobacillus acidophilus 1 Tablet(s) Oral three times a day with meals  OXcarbazepine 300 milliGRAM(s) Oral two times a day  pantoprazole    Tablet 40 milliGRAM(s) Oral before breakfast  propranolol 20 milliGRAM(s) Oral three times a day  senna 2 Tablet(s) Oral at bedtime  tiotropium 18 MICROgram(s) Capsule 1 Capsule(s) Inhalation daily    MEDICATIONS  (PRN):  acetaminophen   Tablet .. 650 milliGRAM(s) Oral every 6 hours PRN Temp greater or equal to 38C (100.4F), Moderate Pain (4 - 6)  ondansetron Injectable 4 milliGRAM(s) IV Push every 8 hours PRN Nausea and/or Vomiting         Vitals log        ICU Vital Signs Last 24 Hrs  T(C): 36.9 (29 Dec 2019 04:40), Max: 36.9 (29 Dec 2019 04:40)  T(F): 98.5 (29 Dec 2019 04:40), Max: 98.5 (29 Dec 2019 04:40)  HR: 88 (29 Dec 2019 04:40) (81 - 88)  BP: 125/70 (29 Dec 2019 04:40) (108/72 - 197/62)  BP(mean): --  ABP: --  ABP(mean): --  RR: 18 (29 Dec 2019 04:40) (18 - 19)  SpO2: 95% (29 Dec 2019 04:40) (95% - 99%)           Input and Output:  I&O's Detail    28 Dec 2019 07:01  -  29 Dec 2019 07:00  --------------------------------------------------------  IN:    Oral Fluid: 720 mL  Total IN: 720 mL    OUT:    Voided: 630 mL  Total OUT: 630 mL    Total NET: 90 mL          Lab Data                        12.1   9.80  )-----------( 442      ( 29 Dec 2019 07:38 )             38.7     12-28    145  |  108  |  10  ----------------------------<  99  4.0   |  33<H>  |  0.49<L>    Ca    8.9      28 Dec 2019 08:00              Review of Systems	      Objective     Physical Examination    head at  heart s1s2  lung dec BS  abd soft      Pertinent Lab findings & Imaging      Shauna:  NO   Adequate UO     I&O's Detail    28 Dec 2019 07:01  -  29 Dec 2019 07:00  --------------------------------------------------------  IN:    Oral Fluid: 720 mL  Total IN: 720 mL    OUT:    Voided: 630 mL  Total OUT: 630 mL    Total NET: 90 mL               Discussed with:     Cultures:	        Radiology

## 2019-12-29 NOTE — PROGRESS NOTE ADULT - PROBLEM SELECTOR PLAN 1
mod R, slowly resolving  encouraged patient to do more incentive spirometry 10 times an hourse, reinforced with Christian  apprec ct surg dr patel, serial cxr   apprec pulm recs cont incentive spirometry and moitor as above

## 2019-12-29 NOTE — PROGRESS NOTE ADULT - PROBLEM SELECTOR PLAN 3
CT abd/pelvis: Large amount of stool within the rectum with surrounding inflammatory change. Stercoral colitis.  Likely overflow diarrhea   -s/p IV Flagyl in the ED. Continue IV Flagyl for 2 more days  -Start Senna at bedtime  -Bacid TID  -Consider Mesalamine suppository  GI consulted.

## 2019-12-29 NOTE — PROGRESS NOTE ADULT - PROBLEM SELECTOR PLAN 1
resp status unchanged - CXR remains unchanged  on o2 support  check sat on room air   chronic lung disease - ILD - PTX -   thoracic eval noted  I jesus  on spiriva and proventil   vs and HD and Sat reviewed  supportive medical regimen - will follow and monitor -

## 2019-12-29 NOTE — PROGRESS NOTE ADULT - PROBLEM SELECTOR PLAN 1
ct showing stercoral colitis  on abx for uti  cont bacid  cont senna as tolerated  regular diet  monitor exam/gi fxn  up to date w colonoscopy

## 2019-12-29 NOTE — PROGRESS NOTE ADULT - PROBLEM SELECTOR PLAN 2
UA: moderate LEC, WBC 26-50, moderate bacteria. Patient reports foul smell, currently using diapers due to feeling weak  -s/p IV Rocephin in the ED. Continue IV Rocephin finish today, 3 day course  -Follow up urine culture

## 2019-12-29 NOTE — CHART NOTE - NSCHARTNOTEFT_GEN_A_CORE
Called by RN for Pt c/o feeling unwell after taking Trileptal medication.  Patient was admitted with similar complaint of having dizziness, hallucinations, and fogginess after taking medication.  Trileptal level was drawn on admission, however the level is not back yet.  Last known seizure was March 2019.  Patient and patient's  state that patient had an episode last March which was a CVA vs seizure and was started on an antiepileptic med.  Patient reports starting Trileptal about 2-3 weeks ago by Dr. Wallace outpatient neurologist because she was unhappy with the feeling the previous medication made her feel.  Patient's past pharmacy medications were reviewed which show a h/o taking Lincosamide, Keppra, Valproic acid and phenytoin.      Patient was seen and examined at bedside. Patient complained of nausea despite taking a Zofran and feeling confused about where she is.  Patient also reports that overnight she woke up confused and didn't know where she was. Reports some trouble breathing and chills. Patient denies any chest pain, fevers.          T(C): 36.5 (12-29-19 @ 07:25), Max: 36.9 (12-29-19 @ 04:40)  HR: 84 (12-29-19 @ 07:25) (81 - 88)  BP: 125/82 (12-29-19 @ 07:25) (108/72 - 197/62)  RR: 18 (12-29-19 @ 07:25) (18 - 19)  SpO2: 97% (12-29-19 @ 07:25) (95% - 99%)  Wt(kg): --    Physical :  Gen- NAD, ncat  Cardio - s+1,s+2, rrr, no murmur  Lung - decreased breath sounds b/l. No wheezes.  Ext- no edema, 2+ pulses b/l  Neuro- AAO to person, place, time, and situation.  CN intact.      LABS:                        12.1   9.80  )-----------( 442      ( 29 Dec 2019 07:38 )             38.7     12-29    137  |  100  |  7   ----------------------------<  108<H>  3.9   |  35<H>  |  0.50    Ca    8.9      29 Dec 2019 07:38    TPro  6.5  /  Alb  2.6<L>  /  TBili  0.3  /  DBili  x   /  AST  16  /  ALT  19  /  AlkPhos  106  12-29                Assessment/Plan  59F with PMH of interstitial lung disease hx of pneumothorax (on 2L NC at home), hx pulmonary nodules, Meniere's disease, Non-Hodgkin's lymphoma (SCD/XRT/chemo at [facility] [place] 2003), hx of CVA (3/2019 - residual L sided weakness, unable to use her L arm), not on ASA or Plavix due to GIB, Seizure disorder, tachycardia, MVP, hx of chronic SDH, presents with weakness, weight loss, nausea, and diarrhea admitted for R pneumothorax, UTI, colitis.    -Seizure disorder- patient unhappy with Trileptal medication side effects.  Neuro (sha) consulted.  Will follow up Trileptal level drawn 12/26.  No concern for CVA, neuro event at this time.    -Continue medical management for primary problems.   -RN to call with any changes.   -D/w Dr. Navarro

## 2019-12-29 NOTE — PROGRESS NOTE ADULT - SUBJECTIVE AND OBJECTIVE BOX
Patient is a 59y old  Female who presents with a chief complaint of pneumothorax, colitis, UTI (28 Dec 2019 14:23)      INTERVAL HPI:  OVERNIGHT EVENTS:  T(F): 98.5 (12-29-19 @ 04:40), Max: 98.5 (12-29-19 @ 04:40)  HR: 88 (12-29-19 @ 04:40) (81 - 88)  BP: 125/70 (12-29-19 @ 04:40) (108/72 - 197/62)  RR: 18 (12-29-19 @ 04:40) (18 - 19)  SpO2: 95% (12-29-19 @ 04:40) (95% - 99%)  I&O's Summary    28 Dec 2019 07:01  -  29 Dec 2019 07:00  --------------------------------------------------------  IN: 720 mL / OUT: 630 mL / NET: 90 mL        REVIEW OF SYSTEMS:  CONSTITUTIONAL: No fever, weight loss, or fatigue  EYES: No eye pain, visual disturbances, or discharge  ENMT:  No difficulty hearing, tinnitus, vertigo; No sinus or throat pain  NECK: No pain or stiffness  BREASTS: No pain, masses, or nipple discharge  RESPIRATORY: No cough, wheezing, chills or hemoptysis; No shortness of breath  CARDIOVASCULAR: No chest pain, palpitations, dizziness, or leg swelling  GASTROINTESTINAL: No abdominal or epigastric pain. No nausea, vomiting, or hematemesis; No diarrhea or constipation. No melena or hematochezia.  GENITOURINARY: No dysuria, frequency, hematuria, or incontinence  NEUROLOGICAL: No headaches, memory loss, loss of strength, numbness, or tremors  SKIN: No itching, burning, rashes, or lesions   LYMPH NODES: No enlarged glands  ENDOCRINE: No heat or cold intolerance; No hair loss  MUSCULOSKELETAL: No joint pain or swelling; No muscle, back, or extremity pain  PSYCHIATRIC: No depression, anxiety, mood swings, or difficulty sleeping  HEME/LYMPH: No easy bruising, or bleeding gums  ALLERY AND IMMUNOLOGIC: No hives or eczema    PHYSICAL EXAM:  GENERAL: NAD, well-groomed, well-developed  HEAD:  Atraumatic, Normocephalic  EYES: EOMI, PERRLA, conjunctiva and sclera clear  ENMT: No tonsillar erythema, exudates, or enlargement; Moist mucous membranes, Good dentition, No lesions  NECK: Supple, No JVD, Normal thyroid  NERVOUS SYSTEM:  Alert & Oriented X3, Good concentration; Motor Strength 5/5 B/L upper and lower extremities; DTRs 2+ intact and symmetric  CHEST/LUNG: Clear to percussion bilaterally; No rales, rhonchi, wheezing, or rubs  HEART: Regular rate and rhythm; No murmurs, rubs, or gallops  ABDOMEN: Soft, Nontender, Nondistended; Bowel sounds present  EXTREMITIES:  2+ Peripheral Pulses, No clubbing, cyanosis, or edema  LYMPH: No lymphadenopathy noted  SKIN: No rashes or lesions    LABS:                        10.5   7.11  )-----------( 375      ( 28 Dec 2019 08:00 )             33.8     12-28    145  |  108  |  10  ----------------------------<  99  4.0   |  33<H>  |  0.49<L>    Ca    8.9      28 Dec 2019 08:00          CAPILLARY BLOOD GLUCOSE          12-27 @ 20:58   No enteric pathogens to date: Final culture pending  --  --  12-26 @ 22:26   No growth to date.  --  --          MEDICATIONS  (STANDING):  cefTRIAXone   IVPB 1000 milliGRAM(s) IV Intermittent every 24 hours  enoxaparin Injectable 40 milliGRAM(s) SubCutaneous daily  influenza   Vaccine 0.5 milliLiter(s) IntraMuscular once  lactobacillus acidophilus 1 Tablet(s) Oral three times a day with meals  OXcarbazepine 300 milliGRAM(s) Oral two times a day  pantoprazole    Tablet 40 milliGRAM(s) Oral before breakfast  propranolol 20 milliGRAM(s) Oral three times a day  senna 2 Tablet(s) Oral at bedtime  tiotropium 18 MICROgram(s) Capsule 1 Capsule(s) Inhalation daily    MEDICATIONS  (PRN):  acetaminophen   Tablet .. 650 milliGRAM(s) Oral every 6 hours PRN Temp greater or equal to 38C (100.4F), Moderate Pain (4 - 6)  ondansetron Injectable 4 milliGRAM(s) IV Push every 8 hours PRN Nausea and/or Vomiting Patient is a 59y old  Female who presents with a chief complaint of pneumothorax, colitis, UTI (28 Dec 2019 14:23)      INTERVAL HPI: Pt seen and examined. Says she couldnt sleep well last night due to nightmare.  States she is feeling slightly better, headache resolved now. RN informed me after encounter pt would like neuro to eval as she gets nightmares with tripleptal. Denies any other acute complaints at this time.     OVERNIGHT EVENTS: none noted  T(F): 98.5 (12-29-19 @ 04:40), Max: 98.5 (12-29-19 @ 04:40)  HR: 88 (12-29-19 @ 04:40) (81 - 88)  BP: 125/70 (12-29-19 @ 04:40) (108/72 - 197/62)  RR: 18 (12-29-19 @ 04:40) (18 - 19)  SpO2: 95% (12-29-19 @ 04:40) (95% - 99%)  I&O's Summary    28 Dec 2019 07:01  -  29 Dec 2019 07:00  --------------------------------------------------------  IN: 720 mL / OUT: 630 mL / NET: 90 mL        REVIEW OF SYSTEMS:  CONSTITUTIONAL: No fever, weight loss, or fatigue  RESPIRATORY: No cough, wheezing, chills or hemoptysis; No shortness of breath except as above  CARDIOVASCULAR: No chest pain, palpitations, dizziness, or leg swelling  GASTROINTESTINAL: No abdominal or epigastric pain. No nausea, vomiting, or hematemesis; No diarrhea or constipation. No melena or hematochezia.  GENITOURINARY: No dysuria, frequency, hematuria, or incontinence  NEUROLOGICAL: No headaches, memory loss, loss of strength, numbness, or tremors  SKIN: No itching, burning, rashes, or lesions   MUSCULOSKELETAL: No joint pain or swelling; No muscle, back, or extremity pain  PSYCHIATRIC: No depression, anxiety, mood swings, or difficulty sleeping    PHYSICAL EXAM:  GENERAL: NAD, well-groomed, well-developed  HEAD:  Atraumatic, Normocephalic  EYES: EOMI, PERRLA, conjunctiva and sclera clear  ENMT: No tonsillar erythema, exudates, or enlargement; Moist mucous membranes, Good dentition, No lesions  NECK: Supple, No JVD, Normal thyroid  NERVOUS SYSTEM:  Alert & Oriented X3, Good concentration; Motor Strength 5/5 B/L upper and lower extremities; DTRs 2+ intact and symmetric  CHEST/LUNG: Clear to percussion bilaterally; No rales, rhonchi, wheezing, or rubs  HEART: Regular rate and rhythm; No murmurs, rubs, or gallops  ABDOMEN: Soft, Nontender, Nondistended; Bowel sounds present  EXTREMITIES:  2+ Peripheral Pulses, No clubbing, cyanosis, or edema  LYMPH: No lymphadenopathy noted  SKIN: No rashes or lesions    LABS:                        10.5   7.11  )-----------( 375      ( 28 Dec 2019 08:00 )             33.8     12-28    145  |  108  |  10  ----------------------------<  99  4.0   |  33<H>  |  0.49<L>    Ca    8.9      28 Dec 2019 08:00          CAPILLARY BLOOD GLUCOSE          12-27 @ 20:58   No enteric pathogens to date: Final culture pending  --  --  12-26 @ 22:26   No growth to date.  --  --          MEDICATIONS  (STANDING):  cefTRIAXone   IVPB 1000 milliGRAM(s) IV Intermittent every 24 hours  enoxaparin Injectable 40 milliGRAM(s) SubCutaneous daily  influenza   Vaccine 0.5 milliLiter(s) IntraMuscular once  lactobacillus acidophilus 1 Tablet(s) Oral three times a day with meals  OXcarbazepine 300 milliGRAM(s) Oral two times a day  pantoprazole    Tablet 40 milliGRAM(s) Oral before breakfast  propranolol 20 milliGRAM(s) Oral three times a day  senna 2 Tablet(s) Oral at bedtime  tiotropium 18 MICROgram(s) Capsule 1 Capsule(s) Inhalation daily    MEDICATIONS  (PRN):  acetaminophen   Tablet .. 650 milliGRAM(s) Oral every 6 hours PRN Temp greater or equal to 38C (100.4F), Moderate Pain (4 - 6)  ondansetron Injectable 4 milliGRAM(s) IV Push every 8 hours PRN Nausea and/or Vomiting

## 2019-12-29 NOTE — PROGRESS NOTE ADULT - SUBJECTIVE AND OBJECTIVE BOX
INTERVAL HPI/OVERNIGHT EVENTS:  pt seen and examined  denies n/v/abd pain  c/o soft stools yesterday, per overnight rn passed 1 soft bm    MEDICATIONS  (STANDING):  cefTRIAXone   IVPB 1000 milliGRAM(s) IV Intermittent every 24 hours  enoxaparin Injectable 40 milliGRAM(s) SubCutaneous daily  influenza   Vaccine 0.5 milliLiter(s) IntraMuscular once  lactobacillus acidophilus 1 Tablet(s) Oral three times a day with meals  OXcarbazepine 300 milliGRAM(s) Oral two times a day  pantoprazole    Tablet 40 milliGRAM(s) Oral before breakfast  propranolol 20 milliGRAM(s) Oral three times a day  senna 2 Tablet(s) Oral at bedtime  tiotropium 18 MICROgram(s) Capsule 1 Capsule(s) Inhalation daily    MEDICATIONS  (PRN):  acetaminophen   Tablet .. 650 milliGRAM(s) Oral every 6 hours PRN Temp greater or equal to 38C (100.4F), Moderate Pain (4 - 6)  ondansetron Injectable 4 milliGRAM(s) IV Push every 8 hours PRN Nausea and/or Vomiting      Allergies    IV Contrast (Anaphylaxis)  shellfish. (Anaphylaxis)    Intolerances        Review of Systems:    General:  No wt loss, fevers, chills, night sweats, fatigue   Eyes:  Good vision, no reported pain  ENT:  No sore throat, pain, runny nose, dysphagia  CV:  No pain, palpitations, hypo/hypertension  Resp:  No dyspnea, cough, tachypnea, wheezing  GI:  No pain, No nausea, No vomiting, +diarrhea, No constipation, No weight loss, No fever, No pruritis, No rectal bleeding, No melena, No dysphagia  :  No pain, bleeding, incontinence, nocturia  Muscle:  No pain, weakness  Neuro:  No weakness, tingling, memory problems  Psych:  No fatigue, insomnia, mood problems, depression  Endocrine:  No polyuria, polydypsia, cold/heat intolerance  Heme:  No petechiae, ecchymosis, easy bruisability  Skin:  No rash, tattoos, scars, edema      Vital Signs Last 24 Hrs  T(C): 36.5 (29 Dec 2019 07:25), Max: 36.9 (29 Dec 2019 04:40)  T(F): 97.7 (29 Dec 2019 07:25), Max: 98.5 (29 Dec 2019 04:40)  HR: 84 (29 Dec 2019 07:25) (81 - 88)  BP: 125/82 (29 Dec 2019 07:25) (108/72 - 197/62)  BP(mean): --  RR: 18 (29 Dec 2019 07:25) (18 - 19)  SpO2: 97% (29 Dec 2019 07:25) (95% - 99%)    PHYSICAL EXAM:  Constitutional: lying in bed  HEENT: ncat  Neck: No LAD  Gastrointestinal: soft nt nd  Extremities: No peripheral edema  Neurological: Awake alert responds appropriately      LABS:                        12.1   9.80  )-----------( 442      ( 29 Dec 2019 07:38 )             38.7     12-29    137  |  100  |  7   ----------------------------<  108<H>  3.9   |  35<H>  |  0.50    Ca    8.9      29 Dec 2019 07:38    TPro  6.5  /  Alb  2.6<L>  /  TBili  0.3  /  DBili  x   /  AST  16  /  ALT  19  /  AlkPhos  106  12-29          RADIOLOGY & ADDITIONAL TESTS:

## 2019-12-30 ENCOUNTER — TRANSCRIPTION ENCOUNTER (OUTPATIENT)
Age: 59
End: 2019-12-30

## 2019-12-30 LAB
ANION GAP SERPL CALC-SCNC: 6 MMOL/L — SIGNIFICANT CHANGE UP (ref 5–17)
BASE EXCESS BLDV CALC-SCNC: 10 MMOL/L — HIGH (ref -2–2)
BLOOD GAS COMMENTS, VENOUS: SIGNIFICANT CHANGE UP
BLOOD GAS COMMENTS, VENOUS: SIGNIFICANT CHANGE UP
BUN SERPL-MCNC: 8 MG/DL — SIGNIFICANT CHANGE UP (ref 7–23)
CALCIUM SERPL-MCNC: 9 MG/DL — SIGNIFICANT CHANGE UP (ref 8.5–10.1)
CHLORIDE SERPL-SCNC: 98 MMOL/L — SIGNIFICANT CHANGE UP (ref 96–108)
CO2 SERPL-SCNC: 34 MMOL/L — HIGH (ref 22–31)
CREAT SERPL-MCNC: 0.41 MG/DL — LOW (ref 0.5–1.3)
GLUCOSE SERPL-MCNC: 119 MG/DL — HIGH (ref 70–99)
HCO3 BLDV-SCNC: 33 MMOL/L — HIGH (ref 21–29)
HCT VFR BLD CALC: 36.8 % — SIGNIFICANT CHANGE UP (ref 34.5–45)
HGB BLD-MCNC: 11.7 G/DL — SIGNIFICANT CHANGE UP (ref 11.5–15.5)
HOROWITZ INDEX BLDV+IHG-RTO: 21 — SIGNIFICANT CHANGE UP
MCHC RBC-ENTMCNC: 28.6 PG — SIGNIFICANT CHANGE UP (ref 27–34)
MCHC RBC-ENTMCNC: 31.8 GM/DL — LOW (ref 32–36)
MCV RBC AUTO: 90 FL — SIGNIFICANT CHANGE UP (ref 80–100)
NRBC # BLD: 0 /100 WBCS — SIGNIFICANT CHANGE UP (ref 0–0)
OXCARBAZEPINE SERPL-MCNC: 20 UG/ML — SIGNIFICANT CHANGE UP (ref 10–35)
PCO2 BLDV: 56 MMHG — HIGH (ref 35–50)
PH BLDV: 7.41 — SIGNIFICANT CHANGE UP (ref 7.35–7.45)
PLATELET # BLD AUTO: 455 K/UL — HIGH (ref 150–400)
PO2 BLDV: 154 MMHG — HIGH (ref 25–45)
POTASSIUM SERPL-MCNC: 3.7 MMOL/L — SIGNIFICANT CHANGE UP (ref 3.5–5.3)
POTASSIUM SERPL-SCNC: 3.7 MMOL/L — SIGNIFICANT CHANGE UP (ref 3.5–5.3)
RBC # BLD: 4.09 M/UL — SIGNIFICANT CHANGE UP (ref 3.8–5.2)
RBC # FLD: 14.2 % — SIGNIFICANT CHANGE UP (ref 10.3–14.5)
SAO2 % BLDV: 99 % — HIGH (ref 67–88)
SODIUM SERPL-SCNC: 138 MMOL/L — SIGNIFICANT CHANGE UP (ref 135–145)
WBC # BLD: 8.89 K/UL — SIGNIFICANT CHANGE UP (ref 3.8–10.5)
WBC # FLD AUTO: 8.89 K/UL — SIGNIFICANT CHANGE UP (ref 3.8–10.5)

## 2019-12-30 PROCEDURE — 71045 X-RAY EXAM CHEST 1 VIEW: CPT | Mod: 26

## 2019-12-30 PROCEDURE — 99233 SBSQ HOSP IP/OBS HIGH 50: CPT

## 2019-12-30 RX ORDER — ALBUTEROL 90 UG/1
2.5 AEROSOL, METERED ORAL EVERY 6 HOURS
Refills: 0 | Status: DISCONTINUED | OUTPATIENT
Start: 2019-12-30 | End: 2020-01-24

## 2019-12-30 RX ORDER — SUCRALFATE 1 G
1 TABLET ORAL
Refills: 0 | Status: DISCONTINUED | OUTPATIENT
Start: 2019-12-30 | End: 2020-01-24

## 2019-12-30 RX ORDER — METOCLOPRAMIDE HCL 10 MG
5 TABLET ORAL ONCE
Refills: 0 | Status: COMPLETED | OUTPATIENT
Start: 2019-12-30 | End: 2019-12-30

## 2019-12-30 RX ADMIN — Medication 1 TABLET(S): at 08:11

## 2019-12-30 RX ADMIN — OXCARBAZEPINE 300 MILLIGRAM(S): 300 TABLET, FILM COATED ORAL at 08:52

## 2019-12-30 RX ADMIN — Medication 1 GRAM(S): at 17:10

## 2019-12-30 RX ADMIN — ONDANSETRON 4 MILLIGRAM(S): 8 TABLET, FILM COATED ORAL at 02:49

## 2019-12-30 RX ADMIN — OXCARBAZEPINE 300 MILLIGRAM(S): 300 TABLET, FILM COATED ORAL at 21:05

## 2019-12-30 RX ADMIN — ENOXAPARIN SODIUM 40 MILLIGRAM(S): 100 INJECTION SUBCUTANEOUS at 11:13

## 2019-12-30 RX ADMIN — TIOTROPIUM BROMIDE 1 CAPSULE(S): 18 CAPSULE ORAL; RESPIRATORY (INHALATION) at 11:13

## 2019-12-30 RX ADMIN — Medication 650 MILLIGRAM(S): at 08:52

## 2019-12-30 RX ADMIN — Medication 650 MILLIGRAM(S): at 09:40

## 2019-12-30 RX ADMIN — Medication 1 TABLET(S): at 17:10

## 2019-12-30 RX ADMIN — ONDANSETRON 4 MILLIGRAM(S): 8 TABLET, FILM COATED ORAL at 21:07

## 2019-12-30 RX ADMIN — PANTOPRAZOLE SODIUM 40 MILLIGRAM(S): 20 TABLET, DELAYED RELEASE ORAL at 05:51

## 2019-12-30 RX ADMIN — Medication 5 MILLIGRAM(S): at 06:57

## 2019-12-30 NOTE — CHART NOTE - NSCHARTNOTEFT_GEN_A_CORE
Assessment: Pt seen for malnutrition follow up. Chart reviewed, hospital course noted .60 y/o F with PMHs of IBS-Diarrhea predominant, ILD (on 2L NC at home), PTX, pulmonary nodules, Meniere's disease, Non-Hodgkin's lymphoma (SCD/XRT/chemo at [facility] 2003), CVA 3/2019 w/residual L sided weakness, unable to use her L arm), not on ASA or Plavix due to GIB, seizure disorder, MVP, hx of chronic SDH         Factors impacting intake: [ ] none [ ] nausea  [ ] vomiting [ ] diarrhea [ ] constipation  [ ]chewing problems [ ] swallowing issues  [ ] other:     Diet, Regular (12-26-19 @ 22:54)    Intake:     Current Weight: Weight (kg): 54.4 (12-26 @ 14:30)  % Weight Change    Pertinent Medications: MEDICATIONS  (STANDING):  enoxaparin Injectable 40 milliGRAM(s) SubCutaneous daily  influenza   Vaccine 0.5 milliLiter(s) IntraMuscular once  lactobacillus acidophilus 1 Tablet(s) Oral three times a day with meals  OXcarbazepine 300 milliGRAM(s) Oral two times a day  pantoprazole    Tablet 40 milliGRAM(s) Oral before breakfast  propranolol 20 milliGRAM(s) Oral three times a day  senna 2 Tablet(s) Oral at bedtime  sucralfate 1 Gram(s) Oral two times a day  tiotropium 18 MICROgram(s) Capsule 1 Capsule(s) Inhalation daily    MEDICATIONS  (PRN):  acetaminophen   Tablet .. 650 milliGRAM(s) Oral every 6 hours PRN Temp greater or equal to 38C (100.4F), Moderate Pain (4 - 6)  ALBUTerol    0.083% 2.5 milliGRAM(s) Nebulizer every 6 hours PRN Shortness of Breath and/or Wheezing  ondansetron Injectable 4 milliGRAM(s) IV Push every 8 hours PRN Nausea and/or Vomiting    Pertinent Labs: 12-30 Na138 mmol/L Glu 119 mg/dL<H> K+ 3.7 mmol/L Cr  0.41 mg/dL<L> BUN 8 mg/dL 12-29 Alb 2.6 g/dL<L>     CAPILLARY BLOOD GLUCOSE        Skin:     Estimated Needs:   [ ] no change since previous assessment  [ ] recalculated:     Previous Nutrition Diagnosis:   [ ] Inadequate Energy Intake [ ]Inadequate Oral Intake [ ] Excessive Energy Intake   [ ] Underweight [ ] Increased Nutrient Needs [ ] Overweight/Obesity   [ ] Altered GI Function [ ] Unintended Weight Loss [ ] Food & Nutrition Related Knowledge Deficit [ ] Malnutrition     Nutrition Diagnosis is [ ] ongoing  [ ] resolved [ ] not applicable     New Nutrition Diagnosis: [ ] not applicable       Interventions:   Recommend  [ ] Change Diet To:  [ ] Nutrition Supplement  [ ] Nutrition Support  [ ] Other:     Monitoring and Evaluation:   [ ] PO intake [ x ] Tolerance to diet prescription [ x ] weights [ x ] labs[ x ] follow up per protocol  [ ] other: Assessment: Pt seen for malnutrition follow up. Chart reviewed, hospital course noted. 60 y/o F with PMH of IBS-Diarrhea predominant, ILD (on 2L NC at home), PTX, pulmonary nodules, Meniere's disease, Non-Hodgkin's lymphoma (SCD/XRT/chemo at [facility] 2003), CVA 3/2019 w/residual L sided weakness, unable to use her L arm), not on ASA or Plavix due to GIB, seizure disorder, MVP, hx of chronic SDH. Admitted for pneumothorax.    Pt alert, lethargic. Minimally participating in interview. States she can't eat anything due to persistent nausea and cannot even think about food right now. Per RN received reglan this AM and zofran. Last BM 12/29, soft. Pt offered very few food preferences despite this writer asking multiple times.    Factors impacting intake: [ ] none [X] nausea  [ ] vomiting [ ] diarrhea [ ] constipation  [ ]chewing problems [ ] swallowing issues  [X] other: lethargy    Diet, Regular (12-26-19 @ 22:54)    Intake: variable but predicted suboptimal     Current Weight: Weight (kg): 54.4 (12-26 @ 14:30)  % Weight Change    Pertinent Medications: MEDICATIONS  (STANDING):  enoxaparin Injectable 40 milliGRAM(s) SubCutaneous daily  influenza   Vaccine 0.5 milliLiter(s) IntraMuscular once  lactobacillus acidophilus 1 Tablet(s) Oral three times a day with meals  OXcarbazepine 300 milliGRAM(s) Oral two times a day  pantoprazole    Tablet 40 milliGRAM(s) Oral before breakfast  propranolol 20 milliGRAM(s) Oral three times a day  senna 2 Tablet(s) Oral at bedtime  sucralfate 1 Gram(s) Oral two times a day  tiotropium 18 MICROgram(s) Capsule 1 Capsule(s) Inhalation daily    MEDICATIONS  (PRN):  acetaminophen   Tablet .. 650 milliGRAM(s) Oral every 6 hours PRN Temp greater or equal to 38C (100.4F), Moderate Pain (4 - 6)  ALBUTerol    0.083% 2.5 milliGRAM(s) Nebulizer every 6 hours PRN Shortness of Breath and/or Wheezing  ondansetron Injectable 4 milliGRAM(s) IV Push every 8 hours PRN Nausea and/or Vomiting    Pertinent Labs: 12-30 Na138 mmol/L Glu 119 mg/dL<H> K+ 3.7 mmol/L Cr  0.41 mg/dL<L> BUN 8 mg/dL 12-29 Alb 2.6 g/dL<L>     CAPILLARY BLOOD GLUCOSE        Skin: healed stage 2 pressure injury of sacrum/L gluteal fold, healed pressure injury of coccyx    Estimated Needs:   [X] no change since previous assessment  [ ] recalculated:     Previous Nutrition Diagnosis:   [ ] Inadequate Energy Intake [ ]Inadequate Oral Intake [ ] Excessive Energy Intake   [ ] Underweight [ ] Increased Nutrient Needs [ ] Overweight/Obesity   [ ] Altered GI Function [ ] Unintended Weight Loss [ ] Food & Nutrition Related Knowledge Deficit [X] Malnutrition (severe, acute on chronic)  Nutrition Diagnosis is [X] ongoing  [ ] resolved [ ] not applicable     New Nutrition Diagnosis: [X] not applicable       Interventions:   Recommend  [X] Change Diet To: Lacto-Ovo Vegetarian per patient's request.  [X] Nutrition Supplement - per previous RD's assessment has refused po supplements - sent trial of Ensure Enlive chocolate mixed with lactaid ice cream and pt's daughter going to attempt to provide to patient now.  [ ] Nutrition Support  [X] Other: Provide patient's food preferences (bread + butter, vegetable soup, pudding). Encourage po intake and provide meal assistance/encouragement. Bowel regimen as per MD's discretion. Anti-emetics near meal time to treat nausea and allow pt to take in some po intake.    Monitoring and Evaluation:   [X] PO intake [ x ] Tolerance to diet prescription [ x ] weights [ x ] labs[ x ] follow up per protocol  [ ] other: Assessment: Pt seen for malnutrition follow up. Chart reviewed, hospital course noted. 60 y/o F with PMH of IBS-Diarrhea predominant, ILD (on 2L NC at home), PTX, pulmonary nodules, Meniere's disease, Non-Hodgkin's lymphoma (SCD/XRT/chemo at [facility] 2003), CVA 3/2019 w/residual L sided weakness, unable to use her L arm), not on ASA or Plavix due to GIB, seizure disorder, MVP, hx of chronic SDH. Admitted for pneumothorax.    Pt alert, lethargic. Minimally participating in interview. States she can't eat anything due to persistent nausea and cannot even think about food right now. Per RN received reglan this AM and zofran. Last BM 12/29, soft. Pt offered very few food preferences despite this writer asking multiple times.    Factors impacting intake: [ ] none [X] nausea  [ ] vomiting [ ] diarrhea [ ] constipation  [ ]chewing problems [ ] swallowing issues  [X] other: lethargy    Diet, Regular (12-26-19 @ 22:54)    Intake: variable but predicted suboptimal     Current Weight: Weight (kg): 54.4 (12-26 @ 14:30)  % Weight Change    Pertinent Medications: MEDICATIONS  (STANDING):  enoxaparin Injectable 40 milliGRAM(s) SubCutaneous daily  influenza   Vaccine 0.5 milliLiter(s) IntraMuscular once  lactobacillus acidophilus 1 Tablet(s) Oral three times a day with meals  OXcarbazepine 300 milliGRAM(s) Oral two times a day  pantoprazole    Tablet 40 milliGRAM(s) Oral before breakfast  propranolol 20 milliGRAM(s) Oral three times a day  senna 2 Tablet(s) Oral at bedtime  sucralfate 1 Gram(s) Oral two times a day  tiotropium 18 MICROgram(s) Capsule 1 Capsule(s) Inhalation daily    MEDICATIONS  (PRN):  acetaminophen   Tablet .. 650 milliGRAM(s) Oral every 6 hours PRN Temp greater or equal to 38C (100.4F), Moderate Pain (4 - 6)  ALBUTerol    0.083% 2.5 milliGRAM(s) Nebulizer every 6 hours PRN Shortness of Breath and/or Wheezing  ondansetron Injectable 4 milliGRAM(s) IV Push every 8 hours PRN Nausea and/or Vomiting    Pertinent Labs: 12-30 Na138 mmol/L Glu 119 mg/dL<H> K+ 3.7 mmol/L Cr  0.41 mg/dL<L> BUN 8 mg/dL 12-29 Alb 2.6 g/dL<L>     CAPILLARY BLOOD GLUCOSE        Skin: healed stage 2 pressure injury of sacrum/L gluteal fold, healed pressure injury of coccyx    Estimated Needs:   [X] no change since previous assessment  [ ] recalculated:     Previous Nutrition Diagnosis:   [ ] Inadequate Energy Intake [ ]Inadequate Oral Intake [ ] Excessive Energy Intake   [ ] Underweight [ ] Increased Nutrient Needs [ ] Overweight/Obesity   [ ] Altered GI Function [ ] Unintended Weight Loss [ ] Food & Nutrition Related Knowledge Deficit [X] Malnutrition (severe, acute on chronic)  Nutrition Diagnosis is [X] ongoing  [ ] resolved [ ] not applicable     New Nutrition Diagnosis: [X] not applicable       Interventions:   Recommend  [X] Change Diet To: Lacto-Ovo Vegetarian per patient's request.  [X] Nutrition Supplement - per previous RD's assessment has refused po supplements - sent trial of Ensure Enlive chocolate mixed with lactaid ice cream and pt's daughter going to attempt to provide to patient now.  [ ] Nutrition Support  [X] Other: Provide patient's food preferences (bread + butter, vegetable soup, pudding). Encourage po intake and provide meal assistance/encouragement. Bowel regimen as per MD's discretion. Anti-emetics near meal time to treat nausea and allow pt to take in some po intake. Consider appetite stimulant if poor po intake persists.    Monitoring and Evaluation:   [X] PO intake [ x ] Tolerance to diet prescription [ x ] weights [ x ] labs[ x ] follow up per protocol  [ ] other: Assessment: Pt seen for malnutrition follow up. Chart reviewed, hospital course noted. 60 y/o F with PMH of IBS-Diarrhea predominant, ILD (on 2L NC at home), PTX, pulmonary nodules, Meniere's disease, Non-Hodgkin's lymphoma (SCD/XRT/chemo at [facility] 2003), CVA 3/2019 w/residual L sided weakness, unable to use her L arm), not on ASA or Plavix due to GIB, seizure disorder, MVP, hx of chronic SDH. Admitted for pneumothorax.    Pt alert, lethargic. Minimally participating in interview. States she can't eat anything due to persistent nausea and cannot even think about food right now. Per RN received reglan this AM and zofran. Last BM 12/29, soft. Pt offered very few food preferences despite this writer asking multiple times.    Factors impacting intake: [ ] none [X] nausea  [ ] vomiting [ ] diarrhea [ ] constipation  [ ]chewing problems [ ] swallowing issues  [X] other: lethargy    Diet, Regular (12-26-19 @ 22:54)    Intake: variable but predicted suboptimal     Current Weight: Weight (kg): 54.4 (12-26 @ 14:30)  % Weight Change    Pertinent Medications: MEDICATIONS  (STANDING):  enoxaparin Injectable 40 milliGRAM(s) SubCutaneous daily  influenza   Vaccine 0.5 milliLiter(s) IntraMuscular once  lactobacillus acidophilus 1 Tablet(s) Oral three times a day with meals  OXcarbazepine 300 milliGRAM(s) Oral two times a day  pantoprazole    Tablet 40 milliGRAM(s) Oral before breakfast  propranolol 20 milliGRAM(s) Oral three times a day  senna 2 Tablet(s) Oral at bedtime  sucralfate 1 Gram(s) Oral two times a day  tiotropium 18 MICROgram(s) Capsule 1 Capsule(s) Inhalation daily    MEDICATIONS  (PRN):  acetaminophen   Tablet .. 650 milliGRAM(s) Oral every 6 hours PRN Temp greater or equal to 38C (100.4F), Moderate Pain (4 - 6)  ALBUTerol    0.083% 2.5 milliGRAM(s) Nebulizer every 6 hours PRN Shortness of Breath and/or Wheezing  ondansetron Injectable 4 milliGRAM(s) IV Push every 8 hours PRN Nausea and/or Vomiting    Pertinent Labs: 12-30 Na138 mmol/L Glu 119 mg/dL<H> K+ 3.7 mmol/L Cr  0.41 mg/dL<L> BUN 8 mg/dL 12-29 Alb 2.6 g/dL<L>     CAPILLARY BLOOD GLUCOSE        Skin: healed stage 2 pressure injury of sacrum/L gluteal fold, healed pressure injury of coccyx    Estimated Needs:   [X] no change since previous assessment  [ ] recalculated:     Previous Nutrition Diagnosis:   [ ] Inadequate Energy Intake [ ]Inadequate Oral Intake [ ] Excessive Energy Intake   [ ] Underweight [ ] Increased Nutrient Needs [ ] Overweight/Obesity   [ ] Altered GI Function [ ] Unintended Weight Loss [ ] Food & Nutrition Related Knowledge Deficit [X] Malnutrition (severe, acute on chronic)  Nutrition Diagnosis is [X] ongoing  [ ] resolved [ ] not applicable     New Nutrition Diagnosis: [X] not applicable       Interventions:   Recommend  [X] Change Diet To: Lacto-Ovo Vegetarian per patient's request.  [X] Nutrition Supplement - per previous RD's assessment has refused po supplements - sent trial of Ensure Enlive chocolate mixed with lactaid ice cream and pt's daughter going to attempt to provide to patient now.  [ ] Nutrition Support  [X] Other: Provide patient's food preferences (bread + butter, vegetable soup, pudding). Encourage po intake and provide meal assistance/encouragement. Bowel regimen as per MD's discretion. Anti-emetics near meal time to treat nausea and allow pt to take in some po intake. Consider appetite stimulant if poor po intake persists.    Monitoring and Evaluation:   [X] PO intake [ x ] Tolerance to diet prescription [ x ] weights [ x ] labs[ x ] follow up per protocol  [X] other: pt acceptability of supplement

## 2019-12-30 NOTE — DISCHARGE NOTE PROVIDER - NSDCCPCAREPLAN_GEN_ALL_CORE_FT
PRINCIPAL DISCHARGE DIAGNOSIS  Diagnosis: Pneumothorax on right  Assessment and Plan of Treatment:       SECONDARY DISCHARGE DIAGNOSES  Diagnosis: Colitis  Assessment and Plan of Treatment:     Diagnosis: UTI (urinary tract infection)  Assessment and Plan of Treatment: PRINCIPAL DISCHARGE DIAGNOSIS  Diagnosis: Pneumothorax on right  Assessment and Plan of Treatment: Stable, Please follow up with your pulmonologist, Dr. Hazel.      SECONDARY DISCHARGE DIAGNOSES  Diagnosis: Colitis  Assessment and Plan of Treatment: You were treated with antibiotics.  It was likely not an infection.  Continue your home bowel regimen for IBS.  Follow up with your primary care doctor.    Diagnosis: Seizure disorder  Assessment and Plan of Treatment: Continue Trileptal. Follow up with your neurologist.    Diagnosis: UTI (urinary tract infection)  Assessment and Plan of Treatment: You were treated with IV antibiotics for a UTI.

## 2019-12-30 NOTE — DISCHARGE NOTE PROVIDER - HOSPITAL COURSE
FROM ADMISSION H+P:     HPI:    59F with PMH of interstitial lung disease hx of pneumothorax (on 2L NC at home), hx pulmonary nodules, Meniere's disease, Non-Hodgkin's lymphoma (SCD/XRT/chemo at [facility] [place]), hx of CVA (3/2019 - residual L sided weakness, unable to use her L arm), not on ASA or Plavix due to GIB, Seizure disorder, tachycardia, MVP, hx of chronic SDH, presents with weakness, weight loss, nausea, and diarrhea for the past month. Patient admits to 4 lb weight loss in 3 weeks, was seen by her PCP and started on Zofran one week ago. Patient reports going to wound care prior to admission for bed sores, and was advised ED evaluation. Per  at bedside, patient has been having loose BMs several times a day with foul smelling urine for 4 days. Denies fever. Patient also has felt too weak to get out of bed and started using a diaper to urinate/have BMs. Has mainly been wheelchair bound recently due to weakness, but at baseline patient is able to stand and walk short distances. Patient uses 2L NC at home and noticed her SpO2 at 75% on RA with ambulation. Patient was also switched to Oxcarbazepine 2-3 months ago and noticed her symptoms of anxiety, weakness, and "memory" have worsened.  Of note, at night when patient takes her Oxcarbazepine, she starts yelling and having "hallucinations."         In the ED: temp 97.6, HR 97, /80, RR 15, SpO2 92% RA, 98% on 3L NC. WBC 11.10. UA: moderate LEC, WBC 26-50, moderate bacteria. Chest x-ray: Interval enlargement of a right-sided pneumothorax. Small bilateral pleural effusions. Chronic lung fibrotic changes. CT chest/abd/pelvis: Moderate-sized right pneumothorax. Worsening groundglass opacities in both lower lobes, possibly infection. Stercoral colitis. Indeterminant hepatic lesions, possibly metastatic disease. Received IV Rocephin, IV Flagyl, 1L NS Bolus. EKG: NSR 88        Of note,  wants us to check oxcarbazepine levels. (26 Dec 2019 21:14)            ---    HOSPITAL COURSE:         Patient transferred to telemetry unit for further management. Pulmonology, Dr. Rashid consulted who started the patient on 2L nasal canula due to o2 sats of mid 80s to low 90s. Patient evaluated by cardiothoracic surgeon, Dr. France.        ---    CONSULTANTS:         ---    TIME SPENT:    The total amount of time spent reviewing the hospital notes, laboratory values, imaging findings, assessing/counseling the patient, discussing with consultant physicians, social work, nursing staff took -- minutes        ---    Primary care provider was made aware of plan for discharge:      [  ] NO     [  ] YES FROM ADMISSION H+P:     HPI:    59F with PMH of interstitial lung disease hx of pneumothorax (on 2L NC at home), hx pulmonary nodules, Meniere's disease, Non-Hodgkin's lymphoma (SCD/[place]RT/[place]mo at [facility] [place] hx of CVA (3/2019 - residual L sided weakness, unable to use her L arm), not on ASA or Plavix due to GIB, Seizure disorder, tachycardia, MVP, hx of chronic SDH, presents with weakness, weight loss, nausea, and diarrhea for the past month. Patient admits to 4 lb weight loss in 3 weeks, was seen by her PCP and started on Zofran one week ago. Patient reports going to wound care prior to admission for bed sores, and was advised ED evaluation. Per  at bedside, patient has been having loose BMs several times a day with foul smelling urine for 4 days. Denies fever. Patient also has felt too weak to get out of bed and started using a diaper to urinate/have BMs. Has mainly been wheelchair bound recently due to weakness, but at baseline patient is able to stand and walk short distances. Patient uses 2L NC at home and noticed her SpO2 at 75% on RA with ambulation. Patient was also switched to Oxcarbazepine 2-3 months ago and noticed her symptoms of anxiety, weakness, and "memory" have worsened.  Of note, at night when patient takes her Oxcarbazepine, she starts yelling and having "hallucinations."         In the ED: temp 97.6, HR 97, /80, RR 15, SpO2 92% RA, 98% on 3L NC. WBC 11.10. UA: moderate LEC, WBC 26-50, moderate bacteria. Chest x-ray: Interval enlargement of a right-sided pneumothorax. Small bilateral pleural effusions. Chronic lung fibrotic changes. CT chest/abd/pelvis: Moderate-sized right pneumothorax. Worsening groundglass opacities in both lower lobes, possibly infection. Stercoral colitis. Indeterminant hepatic lesions, possibly metastatic disease. Received IV Rocephin, IV Flagyl, 1L NS Bolus. EKG: NSR 88        Of note,  wants us to check oxcarbazepine levels. (26 Dec 2019 21:14)            ---    HOSPITAL COURSE:         Patient transferred to telemetry unit for further management of moderate R sided pneumothorax. Pulmonology, Dr. Rashid consulted who started the patient on 2L nasal canula due to o2 sats of mid 80s to low 90s. Patient evaluated by cardiothoracic surgeon, Dr. France and no further intervention (such as cest tube) was recommended, was advised to monitor stability or resolution of pneumothroax daily with chest xrays.  Patient was started on spiriva and duonebs and daily chest xrays showed significant improvement     in size of pneumothorax, though patient required supplemental oxygen therapy (which patient is on an as needed basis at home).  Pateint was also admitting to foul smelling urine on admission and was started on rocephin for UTI treamtent, which was discontinued after 4 days.  On admission, patient had diarrhea which persisted throughout hospitalization.  CT abdomen done on amdission showed colitis vs inflammatory changes, and patient was started on flagyl.  GI was consulted on patient and diarrhea was likely attributable to IBS than infectious cause and flagyl was stopped.    ---    CONSULTANTS:         ---    TIME SPENT:    The total amount of time spent reviewing the hospital notes, laboratory values, imaging findings, assessing/counseling the patient, discussing with consultant physicians, social work, nursing staff took -- minutes        ---    Primary care provider was made aware of plan for discharge:      [  ] NO     [  ] YES FROM ADMISSION H+P:     HPI:    59F with PMH of interstitial lung disease hx of pneumothorax (on 2L NC at home), hx pulmonary nodules, Meniere's disease, Non-Hodgkin's lymphoma (SCD/XRT/chemo at [facility] [place] 2003), hx of CVA (3/2019 - residual L sided weakness, unable to use her L arm), not on ASA or Plavix due to GIB, Seizure disorder, tachycardia, MVP, hx of chronic SDH, presents with weakness, weight loss, nausea, and diarrhea for the past month. Patient admits to 4 lb weight loss in 3 weeks, was seen by her PCP and started on Zofran one week ago. Patient reports going to wound care prior to admission for bed sores, and was advised ED evaluation. Per  at bedside, patient has been having loose BMs several times a day with foul smelling urine for 4 days. Denies fever. Patient also has felt too weak to get out of bed and started using a diaper to urinate/have BMs. Has mainly been wheelchair bound recently due to weakness, but at baseline patient is able to stand and walk short distances. Patient uses 2L NC at home and noticed her SpO2 at 75% on RA with ambulation. Patient was also switched to Oxcarbazepine 2-3 months ago and noticed her symptoms of anxiety, weakness, and "memory" have worsened.  Of note, at night when patient takes her Oxcarbazepine, she starts yelling and having "hallucinations."         In the ED: temp 97.6, HR 97, /80, RR 15, SpO2 92% RA, 98% on 3L NC. WBC 11.10. UA: moderate LEC, WBC 26-50, moderate bacteria. Chest x-ray: Interval enlargement of a right-sided pneumothorax. Small bilateral pleural effusions. Chronic lung fibrotic changes. CT chest/abd/pelvis: Moderate-sized right pneumothorax. Worsening groundglass opacities in both lower lobes, possibly infection. Stercoral colitis. Indeterminant hepatic lesions, possibly metastatic disease. Received IV Rocephin, IV Flagyl, 1L NS Bolus. EKG: NSR 88        Of note,  wants us to check oxcarbazepine levels. (26 Dec 2019 21:14)            ---    HOSPITAL COURSE:         Patient transferred to telemetry unit for further management of moderate R sided pneumothorax. Pulmonology, Dr. Rashid consulted who started the patient on 2L nasal canula due to o2 sats of mid 80s to low 90s. Patient evaluated by cardiothoracic surgeon, Dr. France and no further intervention (such as cest tube) was recommended, was advised to monitor stability or resolution of pneumothroax daily with chest xrays.  Patient was started on spiriva and duonebs and daily chest xrays showed significant improvement in size of pneumothorax, though patient required supplemental oxygen therapy (which patient is on an as needed basis at home).  Pateint was also admitting to foul smelling urine on admission and was started on rocephin for UTI treamtent, which was discontinued after 4 days.  On admission, patient had diarrhea which persisted throughout hospitalization.  CT abdomen done on amdission showed colitis vs inflammatory changes, and patient was started on flagyl.  GI was consulted on patient and diarrhea was likely attributable to IBS than infectious cause and flagyl was stopped.  Abdominal xray was performed which showed _____.  Regarding patient's history of seizure, she is on Trileptal at home and patient's family requested neuro eval for medication education as they thought current regimen was causing altered mental status and lethargy.  Neuro did not believe this was a medication side effect (Trileptal level was wnl) and recommended outpatient sleep studies.          ---    CONSULTANTS:     NeuroPreston Rashid        ---    TIME SPENT:    The total amount of time spent reviewing the hospital notes, laboratory values, imaging findings, assessing/counseling the patient, discussing with consultant physicians, social work, nursing staff took -- minutes        ---    Primary care provider was made aware of plan for discharge:      [  ] NO     [  ] YES FROM ADMISSION H+P:     HPI:    59F with PMH of interstitial lung disease hx of pneumothorax (on 2L NC at home), hx pulmonary nodules, Meniere's disease, Non-Hodgkin's lymphoma [place] [facility] [place] hx of CVA (3/2019 - residual L sided weakness, unable to use her L arm), not on ASA or Plavix due to GIB, Seizure disorder, tachycardia, MVP, hx of chronic SDH, presents with weakness, weight loss, nausea, and diarrhea for the past month. Patient admits to 4 lb weight loss in 3 weeks, was seen by her PCP and started on Zofran one week ago. Patient reports going to wound care prior to admission for bed sores, and was advised ED evaluation. Per  at bedside, patient has been having loose BMs several times a day with foul smelling urine for 4 days. Denies fever. Patient also has felt too weak to get out of bed and started using a diaper to urinate/have BMs. Has mainly been wheelchair bound recently due to weakness, but at baseline patient is able to stand and walk short distances. Patient uses 2L NC at home and noticed her SpO2 at 75% on RA with ambulation. Patient was also switched to Oxcarbazepine 2-3 months ago and noticed her symptoms of anxiety, weakness, and "memory" have worsened.  Of note, at night when patient takes her Oxcarbazepine, she starts yelling and having "hallucinations."         In the ED: temp 97.6, HR 97, /80, RR 15, SpO2 92% RA, 98% on 3L NC. WBC 11.10. UA: moderate LEC, WBC 26-50, moderate bacteria. Chest x-ray: Interval enlargement of a right-sided pneumothorax. Small bilateral pleural effusions. Chronic lung fibrotic changes. CT chest/abd/pelvis: Moderate-sized right pneumothorax. Worsening groundglass opacities in both lower lobes, possibly infection. Stercoral colitis. Indeterminant hepatic lesions, possibly metastatic disease. Received IV Rocephin, IV Flagyl, 1L NS Bolus. EKG: NSR 88        Of note,  wants us to check oxcarbazepine levels. (26 Dec 2019 21:14)            ---    HOSPITAL COURSE:         Patient transferred to telemetry unit for further management of moderate R sided pneumothorax. Pulmonology, Dr. Rashid consulted who started the patient on 2L nasal canula due to o2 sats of mid 80s to low 90s. Patient evaluated by cardiothoracic surgeon, Dr. France and no further intervention (such as cest tube) was recommended, was advised to monitor stability or resolution of pneumothroax daily with chest xrays.  Patient was started on spiriva and duonebs and daily chest xrays showed significant improvement in size of pneumothorax, though patient required supplemental oxygen therapy (which patient is on an as needed basis at home).  Pateint was also admitting to foul smelling urine on admission and was started on rocephin for UTI treamtent, which was discontinued after 4 days.  On admission, patient had diarrhea which persisted throughout hospitalization.  CT abdomen done on amdission showed colitis vs inflammatory changes, and patient was started on flagyl.  GI was consulted on patient and diarrhea was likely attributable to IBS than infectious cause and flagyl was stopped.  Abdominal xray was performed which showed moderate stool burden.  Regarding patient's history of seizure, she is on Trileptal at home and patient's family requested neuro eval for medication education as they thought current regimen was causing altered mental status and lethargy.  Neuro did not believe this was a medication side effect (Trileptal level was wnl) and recommended outpatient sleep studies.          ---    CONSULTANTS:     NeuroPreston Rashid        ---    TIME SPENT:    The total amount of time spent reviewing the hospital notes, laboratory values, imaging findings, assessing/counseling the patient, discussing with consultant physicians, social work, nursing staff took -- minutes        ---    Primary care provider was made aware of plan for discharge:      [  ] NO     [  ] YES FROM ADMISSION H+P:     HPI:    59F with PMH of interstitial lung disease hx of pneumothorax (on 2L NC at home), hx pulmonary nodules, Meniere's disease, Non-Hodgkin's lymphoma (SCD/XRT/chemo at [facility] [place]), hx of CVA (3/2019 - residual L sided weakness, unable to use her L arm), not on ASA or Plavix due to GIB, Seizure disorder, tachycardia, MVP, hx of chronic SDH, presents with weakness, weight loss, nausea, and diarrhea for the past month. Patient admits to 4 lb weight loss in 3 weeks, was seen by her PCP and started on Zofran one week ago. Patient reports going to wound care prior to admission for bed sores, and was advised ED evaluation. Per  at bedside, patient has been having loose BMs several times a day with foul smelling urine for 4 days. Denies fever. Patient also has felt too weak to get out of bed and started using a diaper to urinate/have BMs. Has mainly been wheelchair bound recently due to weakness, but at baseline patient is able to stand and walk short distances. Patient uses 2L NC at home and noticed her SpO2 at 75% on RA with ambulation. Patient was also switched to Oxcarbazepine 2-3 months ago and noticed her symptoms of anxiety, weakness, and "memory" have worsened.  Of note, at night when patient takes her Oxcarbazepine, she starts yelling and having "hallucinations."         In the ED: temp 97.6, HR 97, /80, RR 15, SpO2 92% RA, 98% on 3L NC. WBC 11.10. UA: moderate LEC, WBC 26-50, moderate bacteria. Chest x-ray: Interval enlargement of a right-sided pneumothorax. Small bilateral pleural effusions. Chronic lung fibrotic changes. CT chest/abd/pelvis: Moderate-sized right pneumothorax. Worsening groundglass opacities in both lower lobes, possibly infection. Stercoral colitis. Indeterminant hepatic lesions, possibly metastatic disease. Received IV Rocephin, IV Flagyl, 1L NS Bolus. EKG: NSR 88        Of note,  wants us to check oxcarbazepine levels. (26 Dec 2019 21:14)            ---    HOSPITAL COURSE:         Patient transferred to telemetry unit for further management of moderate R sided pneumothorax. Pulmonology, Dr. Rashid consulted who started the patient on 2L nasal canula due to o2 sats of mid 80s to low 90s. Patient evaluated by cardiothoracic surgeon, Dr. France and no further intervention (such as cest tube) was recommended, was advised to monitor stability or resolution of pneumothroax daily with chest xrays.  Patient was started on spiriva and duonebs and daily chest xrays showed significant improvement in size of pneumothorax, though patient required supplemental oxygen therapy (which patient is on an as needed basis at home).  Pateint was also admitting to foul smelling urine on admission and was started on rocephin for UTI treamtent, which was discontinued after 4 days.  On admission, patient had diarrhea which persisted throughout hospitalization.  CT abdomen done on admission showed colitis vs inflammatory changes, and patient was started on flagyl.  GI was consulted on patient and diarrhea was likely attributable to IBS than infectious cause and flagyl was stopped.  Abdominal xray was performed which showed moderate stool burden.  Regarding patient's history of seizure, she is on Trileptal at home and patient's family requested neuro eval for medication education as they thought current regimen was causing altered mental status and lethargy.  Neuro did not believe this was a medication side effect (Trileptal level was wnl) and recommended outpatient sleep studies.          ---    CONSULTANTS:     NeuroPreston Rashid        ---    TIME SPENT:    The total amount of time spent reviewing the hospital notes, laboratory values, imaging findings, assessing/counseling the patient, discussing with consultant physicians, social work, nursing staff took -- minutes        ---    Primary care provider was made aware of plan for discharge:      [  ] NO     [  ] YES FROM ADMISSION H+P:     HPI:    59F with PMH of interstitial lung disease hx of pneumothorax (on 2L NC at home), hx pulmonary nodules, Meniere's disease, Non-Hodgkin's lymphoma (SCD/XRT/chemo at [facility] [place] 2003), hx of CVA (3/2019 - residual L sided weakness, unable to use her L arm), not on ASA or Plavix due to GIB, Seizure disorder, tachycardia, MVP, hx of chronic SDH, presents with weakness, weight loss, nausea, and diarrhea for the past month. Patient admits to 4 lb weight loss in 3 weeks, was seen by her PCP and started on Zofran one week ago. Patient reports going to wound care prior to admission for bed sores, and was advised ED evaluation. Per  at bedside, patient has been having loose BMs several times a day with foul smelling urine for 4 days. Denies fever. Patient also has felt too weak to get out of bed and started using a diaper to urinate/have BMs. Has mainly been wheelchair bound recently due to weakness, but at baseline patient is able to stand and walk short distances. Patient uses 2L NC at home and noticed her SpO2 at 75% on RA with ambulation. Patient was also switched to Oxcarbazepine 2-3 months ago and noticed her symptoms of anxiety, weakness, and "memory" have worsened.  Of note, at night when patient takes her Oxcarbazepine, she starts yelling and having "hallucinations."         In the ED: temp 97.6, HR 97, /80, RR 15, SpO2 92% RA, 98% on 3L NC. WBC 11.10. UA: moderate LEC, WBC 26-50, moderate bacteria. Chest x-ray: Interval enlargement of a right-sided pneumothorax. Small bilateral pleural effusions. Chronic lung fibrotic changes. CT chest/abd/pelvis: Moderate-sized right pneumothorax. Worsening groundglass opacities in both lower lobes, possibly infection. Stercoral colitis. Indeterminant hepatic lesions, possibly metastatic disease. Received IV Rocephin, IV Flagyl, 1L NS Bolus. EKG: NSR 88        Of note,  wants us to check oxcarbazepine levels. (26 Dec 2019 21:14)            ---    HOSPITAL COURSE:         Patient transferred to telemetry unit for further management of moderate R sided pneumothorax. Pulmonology, Dr. Rashid consulted who started the patient on 2L nasal canula due to o2 sats of mid 80s to low 90s. Patient evaluated by cardiothoracic surgeon, Dr. France and no further intervention (such as cest tube) was recommended, was advised to monitor stability or resolution of pneumothroax daily with chest xrays.  Patient was started on spiriva and duonebs and daily chest xrays showed significant improvement in size of pneumothorax, though patient required supplemental oxygen therapy (which patient is on an as needed basis at home).  Pateint was also admitting to foul smelling urine on admission and was started on rocephin for UTI treamtent, which was discontinued after 4 days.  On admission, patient had diarrhea which persisted throughout hospitalization.  CT abdomen done on admission showed colitis vs inflammatory changes, and patient was started on flagyl.  GI was consulted on patient and diarrhea was likely attributable to IBS than infectious cause and flagyl was stopped.  Abdominal xray was performed which showed moderate stool burden.  Regarding patient's history of seizure, she is on Trileptal at home and patient's family requested neuro eval for medication education as they thought current regimen was causing altered mental status and lethargy.  Neuro did not believe this was a medication side effect (Trileptal level was wnl) and recommended outpatient sleep studies.          On 1/8, patient had RRT for acute on chronic hypoxemic respiratory failure 2/2 worsening ILD with superimposing aspiration pneumonia. She was started on IV zosyn        ---    CONSULTANTS:     Neuro- Veda Rashid        ---    TIME SPENT:    The total amount of time spent reviewing the hospital notes, laboratory values, imaging findings, assessing/counseling the patient, discussing with consultant physicians, social work, nursing staff took -- minutes        ---    Primary care provider was made aware of plan for discharge:      [  ] NO     [  ] YES FROM ADMISSION H+P:     HPI:    59F with PMH of interstitial lung disease hx of pneumothorax (on 2L NC at home), hx pulmonary nodules, Meniere's disease, Non-Hodgkin's lymphoma (SCD/XRT/chemo at [facility] [place] 2003), hx of CVA (3/2019 - residual L sided weakness, unable to use her L arm), not on ASA or Plavix due to GIB, Seizure disorder, tachycardia, MVP, hx of chronic SDH, presents with weakness, weight loss, nausea, and diarrhea for the past month. Patient admits to 4 lb weight loss in 3 weeks, was seen by her PCP and started on Zofran one week ago. Patient reports going to wound care prior to admission for bed sores, and was advised ED evaluation. Per  at bedside, patient has been having loose BMs several times a day with foul smelling urine for 4 days. Denies fever. Patient also has felt too weak to get out of bed and started using a diaper to urinate/have BMs. Has mainly been wheelchair bound recently due to weakness, but at baseline patient is able to stand and walk short distances. Patient uses 2L NC at home and noticed her SpO2 at 75% on RA with ambulation. Patient was also switched to Oxcarbazepine 2-3 months ago and noticed her symptoms of anxiety, weakness, and "memory" have worsened.  Of note, at night when patient takes her Oxcarbazepine, she starts yelling and having "hallucinations."         In the ED: temp 97.6, HR 97, /80, RR 15, SpO2 92% RA, 98% on 3L NC. WBC 11.10. UA: moderate LEC, WBC 26-50, moderate bacteria. Chest x-ray: Interval enlargement of a right-sided pneumothorax. Small bilateral pleural effusions. Chronic lung fibrotic changes. CT chest/abd/pelvis: Moderate-sized right pneumothorax. Worsening groundglass opacities in both lower lobes, possibly infection. Stercoral colitis. Indeterminant hepatic lesions, possibly metastatic disease. Received IV Rocephin, IV Flagyl, 1L NS Bolus. EKG: NSR 88        Of note,  wants us to check oxcarbazepine levels. (26 Dec 2019 21:14)            ---    HOSPITAL COURSE:         Patient transferred to telemetry unit for further management of moderate R sided pneumothorax. Pulmonology, Dr. Rashid consulted who started the patient on 2L nasal canula due to o2 sats of mid 80s to low 90s. Patient evaluated by cardiothoracic surgeon, Dr. France and no further intervention (such as cest tube) was recommended, was advised to monitor stability or resolution of pneumothroax daily with chest xrays.  Patient was started on spiriva and duonebs and daily chest xrays showed significant improvement in size of pneumothorax, though patient required supplemental oxygen therapy (which patient is on an as needed basis at home).  Pateint was also admitting to foul smelling urine on admission and was started on rocephin for UTI treamtent, which was discontinued after 4 days.  On admission, patient had diarrhea which persisted throughout hospitalization.  CT abdomen done on admission showed colitis vs inflammatory changes, and patient was started on flagyl.  GI was consulted on patient and diarrhea was likely attributable to IBS than infectious cause and flagyl was stopped.  Abdominal xray was performed which showed moderate stool burden.  Regarding patient's history of seizure, she is on Trileptal at home and patient's family requested neuro eval for medication education as they thought current regimen was causing altered mental status and lethargy.  Neuro did not believe this was a medication side effect (Trileptal level was wnl) and recommended outpatient sleep studies.          On 1/8, patient had RRT for acute on chronic hypoxemic respiratory failure 2/2 worsening ILD with superimposing aspiration pneumonia, change in mental status, and fever. She was started on IV zosyn, IVF, repeat CT chest showed stable chronic pneumothorax, worsening groundglass opacities and consolidation. Fever workup done. Patient refused urine cx. Dr. France evaluated patient and recommended chest tube which patient refused. B/l dopplers were performed to r/o DVT, which was negative. CTA chest was ordered to r/o PE but patient refused CT as she reports IV contrast allergy (anaphylaxis) despite being pre-medicated. Steroids?----------        ---    CONSULTANTS:     Emery Rashid        ---    TIME SPENT:    The total amount of time spent reviewing the hospital notes, laboratory values, imaging findings, assessing/counseling the patient, discussing with consultant physicians, social work, nursing staff took -- minutes        ---    Primary care provider was made aware of plan for discharge:      [  ] NO     [  ] YES FROM ADMISSION H+P:     HPI:    59F with PMH of interstitial lung disease hx of pneumothorax (on 2L NC at home), hx pulmonary nodules, Meniere's disease, Non-Hodgkin's lymphoma (SCD/XRT/chemo at [facility] [place] 2003), hx of CVA (3/2019 - residual L sided weakness, unable to use her L arm), not on ASA or Plavix due to GIB, Seizure disorder, tachycardia, MVP, hx of chronic SDH, presents with weakness, weight loss, nausea, and diarrhea for the past month. Patient admits to 4 lb weight loss in 3 weeks, was seen by her PCP and started on Zofran one week ago. Patient reports going to wound care prior to admission for bed sores, and was advised ED evaluation. Per  at bedside, patient has been having loose BMs several times a day with foul smelling urine for 4 days. Denies fever. Patient also has felt too weak to get out of bed and started using a diaper to urinate/have BMs. Has mainly been wheelchair bound recently due to weakness, but at baseline patient is able to stand and walk short distances. Patient uses 2L NC at home and noticed her SpO2 at 75% on RA with ambulation. Patient was also switched to Oxcarbazepine 2-3 months ago and noticed her symptoms of anxiety, weakness, and "memory" have worsened.  Of note, at night when patient takes her Oxcarbazepine, she starts yelling and having "hallucinations."         In the ED: temp 97.6, HR 97, /80, RR 15, SpO2 92% RA, 98% on 3L NC. WBC 11.10. UA: moderate LEC, WBC 26-50, moderate bacteria. Chest x-ray: Interval enlargement of a right-sided pneumothorax. Small bilateral pleural effusions. Chronic lung fibrotic changes. CT chest/abd/pelvis: Moderate-sized right pneumothorax. Worsening groundglass opacities in both lower lobes, possibly infection. Stercoral colitis. Indeterminant hepatic lesions, possibly metastatic disease. Received IV Rocephin, IV Flagyl, 1L NS Bolus. EKG: NSR 88        Of note,  wants us to check oxcarbazepine levels. (26 Dec 2019 21:14)            ---    HOSPITAL COURSE:         Patient transferred to telemetry unit for further management of moderate R sided pneumothorax. Pulmonology, Dr. Rashid consulted who started the patient on 2L nasal canula due to o2 sats of mid 80s to low 90s. Patient evaluated by cardiothoracic surgeon, Dr. France and no further intervention (such as cest tube) was recommended, was advised to monitor stability or resolution of pneumothroax daily with chest xrays.  Patient was started on spiriva and duonebs and daily chest xrays showed significant improvement in size of pneumothorax, though patient required supplemental oxygen therapy (which patient is on an as needed basis at home).  Pateint was also admitting to foul smelling urine on admission and was started on rocephin for UTI treamtent, which was discontinued after 4 days.  On admission, patient had diarrhea which persisted throughout hospitalization.  CT abdomen done on admission showed colitis vs inflammatory changes, and patient was started on flagyl.  GI was consulted on patient and diarrhea was likely attributable to IBS than infectious cause and flagyl was stopped.  Abdominal xray was performed which showed moderate stool burden.  Regarding patient's history of seizure, she is on Trileptal at home and patient's family requested neuro eval for medication education as they thought current regimen was causing altered mental status and lethargy.  Neuro did not believe this was a medication side effect (Trileptal level was wnl) and recommended outpatient sleep studies.          On 1/8, patient had RRT for acute on chronic hypoxemic respiratory failure 2/2 worsening ILD with superimposing aspiration pneumonia, change in mental status, and fever. She was started on IV zosyn, IVF, repeat CT chest showed stable chronic pneumothorax, worsening groundglass opacities and consolidation. Fever workup done. Patient refused urine cx. Dr. France evaluated patient and recommended chest tube which patient refused. B/l dopplers were performed to r/o DVT, which was negative. CTA chest was ordered to r/o PE but patient refused CT as she reports IV contrast allergy (anaphylaxis) despite being pre-medicated.    Chest tube was placed on 1/12/2020 and removed 1/16/20, with minimal improvement of PTX.    For Anxiety and steroid induced delirium pt was given precedex which titrated down as tolerated. Started on ativan for anxiety to transition off precedex. Delirium resolved.    For Seizure Disorder trileptal was continued, levels checked. Pt was given morphine for pain related to CT which was eventually discontinued.     Pts BP improved off levo, home propanolol was continued.     For Acute on chronic hypoxemic respiratory failure 2/2 ILD and HCAP  Zosyn was given which was completed. Pt was trailed a 3 day course of pulse steroids at 500mg bid and then tapered down to 40mg  qd. Pts  and daughter agreed with high dose steroid for short 3 day course. After high dose steriods pts SOB improved, lung exam significantly improved with decrease in crackles b/l and coarse BS.    Pt remained on High flow and FiO2 titrated down from 70 to 40% FiO2, pt maintaining O2 sat of 94%. Will continue to titrate down as tolerated. Spiriva continued.    Pt given oral fluconazole for oral thrush which she refused, started on nystatin swish and swallow for oral thrush    Continue mesalamine and witch hazel for colitis. PO intake improved gradually with encouragement. Pt encouraged to use incentive spirometer.     Renal function remained normal and lytes were repleted. Pt was given DVT ppx with Lovenox.    Dispo: remained Full code            ---    CONSULTANTS:     Neuro- Veda Colon- Gay    Cardiothoracic - Dr France        ---    TIME SPENT:    The total amount of time spent reviewing the hospital notes, laboratory values, imaging findings, assessing/counseling the patient, discussing with consultant physicians, social work, nursing staff took -- minutes        ---    Primary care provider was made aware of plan for discharge:      [  ] NO     [  ] YES FROM ADMISSION H+P:     HPI:    59F with PMH of interstitial lung disease hx of pneumothorax (on 2L NC at home), hx pulmonary nodules, Meniere's disease, Non-Hodgkin's [place] [facility] [place] hx of CVA (3/2019 - residual L sided weakness, unable to use her L arm), not on ASA or Plavix due to GIB, Seizure disorder, tachycardia, MVP, hx of chronic SDH, presents with weakness, weight loss, nausea, and diarrhea for the past month. Patient admits to 4 lb weight loss in 3 weeks, was seen by her PCP and started on Zofran one week ago. Patient reports going to wound care prior to admission for bed sores, and was advised ED evaluation. Per  at bedside, patient has been having loose BMs several times a day with foul smelling urine for 4 days. Denies fever. Patient also has felt too weak to get out of bed and started using a diaper to urinate/have BMs. Has mainly been wheelchair bound recently due to weakness, but at baseline patient is able to stand and walk short distances. Patient uses 2L NC at home and noticed her SpO2 at 75% on RA with ambulation. Patient was also switched to Oxcarbazepine 2-3 months ago and noticed her symptoms of anxiety, weakness, and "memory" have worsened.  Of note, at night when patient takes her Oxcarbazepine, she starts yelling and having "hallucinations."         In the ED: temp 97.6, HR 97, /80, RR 15, SpO2 92% RA, 98% on 3L NC. WBC 11.10. UA: moderate LEC, WBC 26-50, moderate bacteria. Chest x-ray: Interval enlargement of a right-sided pneumothorax. Small bilateral pleural effusions. Chronic lung fibrotic changes. CT chest/abd/pelvis: Moderate-sized right pneumothorax. Worsening groundglass opacities in both lower lobes, possibly infection. Stercoral colitis. Indeterminant hepatic lesions, possibly metastatic disease. Received IV Rocephin, IV Flagyl, 1L NS Bolus. EKG: NSR 88        Of note,  wants us to check oxcarbazepine levels. (26 Dec 2019 21:14)            ---    HOSPITAL COURSE:         Patient transferred to telemetry unit for further management of moderate R sided pneumothorax. Pulmonology, Dr. Rashid consulted who started the patient on 2L nasal canula due to o2 sats of mid 80s to low 90s. Patient evaluated by cardiothoracic surgeon, Dr. France and no further intervention (such as cest tube) was recommended, was advised to monitor stability or resolution of pneumothroax daily with chest xrays.  Patient was started on spiriva and duonebs and daily chest xrays showed significant improvement in size of pneumothorax, though patient required supplemental oxygen therapy (which patient is on an as needed basis at home).  Pateint was also admitting to foul smelling urine on admission and was started on rocephin for UTI treamtent, which was discontinued after 4 days.  On admission, patient had diarrhea which persisted throughout hospitalization.  CT abdomen done on admission showed colitis vs inflammatory changes, and patient was started on flagyl.  GI was consulted on patient and diarrhea was likely attributable to IBS than infectious cause and flagyl was stopped.  Abdominal xray was performed which showed moderate stool burden.  Regarding patient's history of seizure, she is on Trileptal at home and patient's family requested neuro eval for medication education as they thought current regimen was causing altered mental status and lethargy.  Neuro did not believe this was a medication side effect (Trileptal level was wnl) and recommended outpatient sleep studies.          On 1/8, patient had RRT for acute on chronic hypoxemic respiratory failure 2/2 worsening ILD with superimposing aspiration pneumonia, change in mental status, and fever. She was started on IV zosyn, IVF, repeat CT chest showed stable chronic pneumothorax, worsening groundglass opacities and consolidation. Fever workup done. Patient refused urine cx. Dr. France evaluated patient and recommended chest tube which patient refused. B/l dopplers were performed to r/o DVT, which was negative. CTA chest was ordered to r/o PE but patient refused CT as she reports IV contrast allergy (anaphylaxis) despite being pre-medicated.    Chest tube was placed on 1/12/2020 and removed 1/16/20, with minimal improvement of PTX.    For Anxiety and steroid induced delirium pt was given precedex which titrated down as tolerated. Started on ativan for anxiety to transition off precedex. Delirium resolved.    For Seizure Disorder trileptal was continued, levels checked. Pt was given morphine for pain related to CT which was eventually discontinued.     Pts BP improved off levo, home propanolol was continued.     For Acute on chronic hypoxemic respiratory failure 2/2 ILD and HCAP  Zosyn was given which was completed. Pt was trailed a 3 day course of pulse steroids at 500mg bid and then tapered down to 40mg  qd. Pts  and daughter agreed with high dose steroid for short 3 day course. After high dose steriods pts SOB improved, lung exam significantly improved with decrease in crackles b/l and coarse BS.     Pt remained on High flow and FiO2 titrated down from 70 to 35% FiO2, pt maintaining good O2 sat. Will continue to titrate down as tolerated and transition to NC. Spiriva continued.    Pt given oral fluconazole for oral thrush which she refused, started on nystatin swish and swallow for oral thrush. Magic mouth wash for comfort.    Continue mesalamine and witch hazel for colitis. PO intake improved gradually with encouragement. Pt encouraged to use incentive spirometer.     Renal function remained normal and lytes were repleted. Pt was given DVT ppx with Lovenox.    For anxiety, Dr Morales Psych consulted, pt started on remeron qhs and ativan standing 0.5mg IV q4.    Dispo: remained Full code    Stable for transfer to floors        ---    CONSULTANTS:     Neuro- Veda ARGUELLES- Sheikh Darlene- Gay    Cardiothoracic - Dr France        ---    TIME SPENT:    The total amount of time spent reviewing the hospital notes, laboratory values, imaging findings, assessing/counseling the patient, discussing with consultant physicians, social work, nursing staff took -- minutes        ---    Primary care provider was made aware of plan for discharge:      [  ] NO     [  ] YES FROM ADMISSION H+P:     HPI:    59F with PMH of interstitial lung disease hx of pneumothorax (on 2L NC at home), hx pulmonary nodules, Meniere's disease, Non-Hodgkin's lymphoma (SCD/XRT/chemo at [facility] [place] 2003), hx of CVA (3/2019 - residual L sided weakness, unable to use her L arm), not on ASA or Plavix due to GIB, Seizure disorder, tachycardia, MVP, hx of chronic SDH, presents with weakness, weight loss, nausea, and diarrhea for the past month. Patient admits to 4 lb weight loss in 3 weeks, was seen by her PCP and started on Zofran one week ago. Patient reports going to wound care prior to admission for bed sores, and was advised ED evaluation. Per  at bedside, patient has been having loose BMs several times a day with foul smelling urine for 4 days. Denies fever. Patient also has felt too weak to get out of bed and started using a diaper to urinate/have BMs. Has mainly been wheelchair bound recently due to weakness, but at baseline patient is able to stand and walk short distances. Patient uses 2L NC at home and noticed her SpO2 at 75% on RA with ambulation. Patient was also switched to Oxcarbazepine 2-3 months ago and noticed her symptoms of anxiety, weakness, and "memory" have worsened.  Of note, at night when patient takes her Oxcarbazepine, she starts yelling and having "hallucinations."         In the ED: temp 97.6, HR 97, /80, RR 15, SpO2 92% RA, 98% on 3L NC. WBC 11.10. UA: moderate LEC, WBC 26-50, moderate bacteria. Chest x-ray: Interval enlargement of a right-sided pneumothorax. Small bilateral pleural effusions. Chronic lung fibrotic changes. CT chest/abd/pelvis: Moderate-sized right pneumothorax. Worsening groundglass opacities in both lower lobes, possibly infection. Stercoral colitis. Indeterminant hepatic lesions, possibly metastatic disease. Received IV Rocephin, IV Flagyl, 1L NS Bolus. EKG: NSR 88        Of note,  wants us to check oxcarbazepine levels. (26 Dec 2019 21:14)            ---    HOSPITAL COURSE:         Patient transferred to telemetry unit for further management of moderate R sided pneumothorax. Pulmonology, Dr. Rashid consulted who started the patient on 2L nasal canula due to o2 sats of mid 80s to low 90s. Patient evaluated by cardiothoracic surgeon, Dr. France and no further intervention (such as cest tube) was recommended, was advised to monitor stability or resolution of pneumothroax daily with chest xrays.  Patient was started on spiriva and duonebs and daily chest xrays showed significant improvement in size of pneumothorax, though patient required supplemental oxygen therapy (which patient is on an as needed basis at home).  Pateint was also admitting to foul smelling urine on admission and was started on rocephin for UTI treamtent, which was discontinued after 4 days.  On admission, patient had diarrhea which persisted throughout hospitalization.  CT abdomen done on admission showed colitis vs inflammatory changes, and patient was started on flagyl.  GI was consulted on patient and diarrhea was likely attributable to IBS than infectious cause and flagyl was stopped.  Abdominal xray was performed which showed moderate stool burden.  Regarding patient's history of seizure, she is on Trileptal at home and patient's family requested neuro eval for medication education as they thought current regimen was causing altered mental status and lethargy.  Neuro did not believe this was a medication side effect (Trileptal level was wnl) and recommended outpatient sleep studies.          On 1/8, patient had RRT for acute on chronic hypoxemic respiratory failure 2/2 worsening ILD with superimposing aspiration pneumonia, change in mental status, and fever. She was started on IV zosyn, IVF, repeat CT chest showed stable chronic pneumothorax, worsening groundglass opacities and consolidation. Fever workup done. Patient refused urine cx. Dr. France evaluated patient and recommended chest tube which patient refused. B/l dopplers were performed to r/o DVT, which was negative. CTA chest was ordered to r/o PE but patient refused CT as she reports IV contrast allergy (anaphylaxis) despite being pre-medicated.    Chest tube was placed on 1/12/2020 and removed 1/16/20, with minimal improvement of PTX.    For Anxiety and steroid induced delirium pt was given precedex which titrated down as tolerated. Started on ativan for anxiety to transition off precedex however pt did not tolerate ativan, paradoxical reaction. Psych was consulted and pt given trial of zyprexa and remeron qhs. Pt did not tolerate Zyprexa. Started on xanax 0.125mg prn for anxiety and remeron was continued. CT brain done for suspected leukoencephalopathy    For Seizure Disorder trileptal was continued, levels checked. Pt was given morphine for pain related to CT which was eventually discontinued.     Pts BP improved off levo, home propanolol was continued.     For Acute on chronic hypoxemic respiratory failure 2/2 ILD and HCAP Zosyn was given which was completed. Pt was trailed a 3 day course of pulse steroids at 500mg bid and then tapered down to 40mg qd. Pts  and daughter agreed with high dose steroid for short 3 day course. After high dose steriods pts SOB improved, lung exam significantly improved with decrease in crackles b/l and coarse BS. Pt remained on High flow and FiO2 titrated down from 70 to 35% FiO2, pt maintaining good O2 sat. Will continue to titrate down as tolerated and transition to NC. Spiriva was continued. However pt developed hypercapnic resp failure and started on bipap. Pt refusing bipap and was pulling off the mask when placed on it. When off bipap pt found to be obtunded, unable to wean off to NC.     Pt given oral fluconazole for oral thrush which she refused, started on nystatin swish and swallow for oral thrush. Magic mouth wash for comfort.    Continued mesalamine and witch hazel for colitis. Pt encouraged to use incentive spirometer.     Renal function remained normal and lytes were repleted. Pt was given DVT ppx with Lovenox.    Pt was also found to have Pulm edema on CXR given lasix x1, and diamox to help with diuresis and met alkalosis. New pneumothorax found on L side apical. Both R and L small pneumothoraces remained unchanged. AI workup negative for ILD.    Pt had an elevated white count today 1/23/20, blood cx, UA, Urine cx sent.     Dispo: GOC discussed with Daughter Eva and  Marialuisa, no decision has been made yet relating to PEG/trach if needed. Patient has no Health Care Proxy or Advance Directives. Pt remains a full code. Family requesting transfer to [facility] under care of Dr Hazel. Family explained benefits and risk of transferring patient at this time. Recommended intubating patient however family refusing at this time. Pt is at a high risk of decompensation during transfer while on Bipap. Family understands the risk. All labs sent to [facility]. Radiology Disks to be given to family upon transfer. Plan discussed in detail with Dr Hazel.        Transfer to [facility].         ---    CONSULTANTS:     Neuro- Veda Rashid    Cardiothoracic - Dr Román FERMIN - Dr Monson         ---    TIME SPENT:    The total amount of time spent reviewing the hospital notes, laboratory values, imaging findings, assessing/counseling the patient, discussing with consultant physicians, social work, nursing staff took -- minutes

## 2019-12-30 NOTE — PHYSICAL THERAPY INITIAL EVALUATION ADULT - PERTINENT HX OF CURRENT PROBLEM, REHAB EVAL
Pt admitted from home with right pneumothorax. Pt with h/o non Hodgkin's lymphoma Pt admitted from home with right pneumothorax. Pt with h/o non Hodgkin's lymphoma, CVA 3/19 with Left sided weakness

## 2019-12-30 NOTE — DISCHARGE NOTE PROVIDER - PROVIDER TOKENS
FREE:[LAST:[Hazel],FIRST:[Bettye],PHONE:[phone]:[(   )    -],FOLLOWUP:[1 week]],PROVIDER:[TOKEN:[[id number]],FOLLOWUP:[1 week]],PROVIDER:[TOKEN:[[id number]],FOLLOWUP:[1 month]],FREE:[LAST:[Shad],FIRST:[],PHONE:[(   )    -],FAX:[(   )    -],ADDRESS:[Neurology],FOLLOWUP:[1 week]]

## 2019-12-30 NOTE — PROGRESS NOTE ADULT - SUBJECTIVE AND OBJECTIVE BOX
Patient is a 59y old  Female who presents with a chief complaint of pneumothorax, colitis, UTI (30 Dec 2019 07:18)  ----  INTERVAL HPI/OVERNIGHT EVENTS: Pt seen and evaluated at the bedside. Patient still on 2L nasal canula and saturating 94-97%. When patient was placed on RA briefly this morning her O2 sats dropped down to the mid 80s. Patient admits to feeling generalized weakness and just "unwell". Patient admits to nausea and some constipation.     ----  PAST MEDICAL & SURGICAL HISTORY:  Interstitial lung disease: on home o2 prn  NHL (non-Hodgkin's lymphoma): Agem 43 sp chemo/rt/stem cell  Transient cerebral ischemia, unspecified type  Mitral prolapse  History of tonsillectomy  History of appendectomy      FAMILY HISTORY:  Family history of stroke  Family history of breast cancer: Mother      Allergies    IV Contrast (Anaphylaxis)  shellfish. (Anaphylaxis)    Intolerances        ----  REVIEW OF SYSTEMS:  CONSTITUTIONAL: denies fever, chills, fatigue, admits weakness  HEENT: denies blurred vision, sore throat  SKIN: denies new lesions, rash  CARDIOVASCULAR: denies chest pain, chest pressure, palpitations  RESPIRATORY: denies shortness of breath, sputum production  GASTROINTESTINAL: admits to nausea and constipation, denies vomiting, diarrhea  GENITOURINARY: denies dysuria, discharge  NEUROLOGICAL: denies numbness, headache, focal weakness  MUSCULOSKELETAL: denies new joint pain, muscle aches  HEMATOLOGIC: denies gross bleeding, bruising  LYMPHATICS: denies enlarged lymph nodes, extremity swelling  PSYCHIATRIC: denies recent changes in anxiety, depression  ENDOCRINOLOGIC: denies sweating, cold or heat intolerance    ----  PHYSICAL EXAM:  GENERAL: patient appears well, no acute distress, appropriately interactive  EYES: sclera clear, no exudates  ENMT: oropharynx clear without erythema, moist mucous membranes, slight nystagmus to the left  NECK: supple, soft, no thyromegaly noted  LUNGS: good air entry bilaterally, clear to auscultation, symmetric breath sounds, no wheezing or rhonchi appreciated  HEART: soft S1/S2, regular rate and rhythm, no murmurs noted, no noted edema to b/l LE  GASTROINTESTINAL: abdomen is soft, nontender, nondistended, normoactive bowel sounds, no palpable masses  INTEGUMENT: good skin turgor, appropriate for ethnicity, appears well perfused, no jaundice noted  MUSCULOSKELETAL: no clubbing or cyanosis, no obvious deformity  NEUROLOGIC: awake, alert, oriented x3, good muscle tone in 4 extremities, no obvious sensory deficits  PSYCHIATRIC: mood is good, affect is congruent with mood, linear and logical thought process  HEME/LYMPH: no palpable supraclavicular nodules, no obvious ecchymosis     T(C): 36.5 (12-30-19 @ 07:15), Max: 36.7 (12-30-19 @ 00:08)  HR: 80 (12-30-19 @ 07:15) (71 - 101)  BP: 106/69 (12-30-19 @ 07:15) (91/61 - 139/88)  RR: 18 (12-30-19 @ 07:15) (17 - 18)  SpO2: 98% (12-30-19 @ 07:15) (95% - 98%)  Wt(kg): --    ----  I&O's Summary    29 Dec 2019 07:01  -  30 Dec 2019 07:00  --------------------------------------------------------  IN: 0 mL / OUT: 200 mL / NET: -200 mL        LABS:                        11.7   8.89  )-----------( 455      ( 30 Dec 2019 06:35 )             36.8     12-30    138  |  98  |  8   ----------------------------<  119<H>  3.7   |  34<H>  |  0.41<L>    Ca    9.0      30 Dec 2019 06:35    TPro  6.5  /  Alb  2.6<L>  /  TBili  0.3  /  DBili  x   /  AST  16  /  ALT  19  /  AlkPhos  106  12-29        CAPILLARY BLOOD GLUCOSE          12-27 @ 20:58   No enteric pathogens isolated.  (Stool culture examined for Salmonella,  Shigella, Campylobacter, Aeromonas, Plesiomonas,  Vibrio, E.coli O157 and Yersinia)  --  --  12-26 @ 22:26   No growth to date.  --  --            ----

## 2019-12-30 NOTE — PHYSICAL THERAPY INITIAL EVALUATION ADULT - RANGE OF MOTION EXAMINATION, REHAB EVAL
bilateral lower extremity ROM was WFL (within functional limits)/left UE in a flexion contracture position/deficits as listed below/Right LE ROM was WFL (within functional limits)

## 2019-12-30 NOTE — PROGRESS NOTE ADULT - PROBLEM SELECTOR PLAN 3
CT abd/pelvis: Large amount of stool within the rectum with surrounding inflammatory change. Stercoral colitis.  -s/p 3 day course of IV flagyl   -continue Senna at bedtime  -Bacid TID  -GI, Dr. Hutchinson following

## 2019-12-30 NOTE — PROGRESS NOTE ADULT - PROBLEM SELECTOR PLAN 4
patient reports chronic nausea for the past month. Had an EGD/Colonoscopy this year that was "negative" per patient  -Zofran IV PRN for nausea  -Nutrition recommended diet  -GI, Dr. De La Vega, following

## 2019-12-30 NOTE — PROGRESS NOTE ADULT - ASSESSMENT
60 y/o F with PMHs of IBS-Diarrhea predominant, ILD (on 2L NC at home), PTX, pulmonary nodules, Meniere's disease, Non-Hodgkin's lymphoma (SCD/XRT/chemo at [facility] 2003), CVA 3/2019 w/residual L sided weakness, unable to use her L arm), not on ASA or Plavix due to GIB, seizure disorder, MVP, hx of chronic SDH who presents with weakness, nausea, diarrhea, and foul smelling urine for the past week admitted for further management of incidentally found worsening pneumothorax, UTI, and stercocolitis on CT.

## 2019-12-30 NOTE — DISCHARGE NOTE PROVIDER - CARE PROVIDER_API CALL
Bettye Hazel  Phone: [phone]  Fax: (   )    -  Follow Up Time: 1 week    Joselito Lemon (DO)  Internal Medicine  [address] [place] [address]  Phone: [phone]  Fax: [phone]  Follow Up Time: 1 week    JOHAN MCCLOUD  Cardiovascular Diseases  Phone: [phone]  Fax: [phone]  Follow Up Time: 1 month    Dr Shad  Neurology  Phone: (   )    -  Fax: (   )    -  Follow Up Time: 1 week

## 2019-12-30 NOTE — PROGRESS NOTE ADULT - ATTENDING COMMENTS
59F with PMH of interstitial lung disease hx of pneumothorax (on 2L NC at home), hx pulmonary nodules, Meniere's disease, Non-Hodgkin's lymphoma (SCD/XRT/chemo at [facility] 2003), hx of CVA (3/2019 - residual L sided weakness, unable to use her L arm), not on ASA or Plavix due to GIB, Seizure disorder, tachycardia, MVP, hx of chronic SDH, presents with weakness, weight loss, nausea, and diarrhea admitted for R pneumothorax, UTI, colitis. improving, serial xrays as per CT sx PLan: unresolved ptx, apprec CT sx recs, monitor clinical course, apprec neuro and psych recs, encourage incentive spirometry, apprec pulm optimization

## 2019-12-30 NOTE — PROGRESS NOTE ADULT - SUBJECTIVE AND OBJECTIVE BOX
INTERVAL HPI/OVERNIGHT EVENTS:  pt seen and examined  interim events noted  c/o nausea; states less diarrhea yesterday  tolerated some po yesterday  denies vomiting and abd pain  per overnight rn no diarrhea; received dose of reglan this am    MEDICATIONS  (STANDING):  enoxaparin Injectable 40 milliGRAM(s) SubCutaneous daily  influenza   Vaccine 0.5 milliLiter(s) IntraMuscular once  lactobacillus acidophilus 1 Tablet(s) Oral three times a day with meals  OXcarbazepine 300 milliGRAM(s) Oral two times a day  pantoprazole    Tablet 40 milliGRAM(s) Oral before breakfast  propranolol 20 milliGRAM(s) Oral three times a day  senna 2 Tablet(s) Oral at bedtime  tiotropium 18 MICROgram(s) Capsule 1 Capsule(s) Inhalation daily    MEDICATIONS  (PRN):  acetaminophen   Tablet .. 650 milliGRAM(s) Oral every 6 hours PRN Temp greater or equal to 38C (100.4F), Moderate Pain (4 - 6)  ALBUTerol    0.083% 2.5 milliGRAM(s) Nebulizer every 6 hours PRN Shortness of Breath and/or Wheezing  ondansetron Injectable 4 milliGRAM(s) IV Push every 8 hours PRN Nausea and/or Vomiting      Allergies    IV Contrast (Anaphylaxis)  shellfish. (Anaphylaxis)    Intolerances        Review of Systems:    General:  No wt loss, fevers, chills, night sweats, fatigue   Eyes:  Good vision, no reported pain  ENT:  No sore throat, pain, runny nose, dysphagia  CV:  No pain, palpitations, hypo/hypertension  Resp:  No dyspnea, cough, tachypnea, wheezing  GI:  No pain, +nausea, No vomiting, No diarrhea, No constipation, No weight loss, No fever, No pruritis, No rectal bleeding, No melena, No dysphagia  :  No pain, bleeding, incontinence, nocturia  Muscle:  No pain, weakness  Neuro:  No weakness, tingling, memory problems  Psych:  No fatigue, insomnia, mood problems, depression  Endocrine:  No polyuria, polydypsia, cold/heat intolerance  Heme:  No petechiae, ecchymosis, easy bruisability  Skin:  No rash, tattoos, scars, edema      Vital Signs Last 24 Hrs  T(C): 36.5 (30 Dec 2019 07:15), Max: 36.7 (30 Dec 2019 00:08)  T(F): 97.7 (30 Dec 2019 07:15), Max: 98.1 (30 Dec 2019 00:08)  HR: 80 (30 Dec 2019 07:15) (71 - 101)  BP: 106/69 (30 Dec 2019 07:15) (91/61 - 139/88)  BP(mean): --  RR: 18 (30 Dec 2019 07:15) (17 - 18)  SpO2: 98% (30 Dec 2019 07:15) (95% - 98%)    PHYSICAL EXAM:    Constitutional: lying in bed  HEENT: ncat  Neck: No LAD  Gastrointestinal: soft nt nd  Extremities: No peripheral edema  Neurological: Awake alert responds appropriately          LABS:                        11.7   8.89  )-----------( 455      ( 30 Dec 2019 06:35 )             36.8     12-30    138  |  98  |  8   ----------------------------<  119<H>  3.7   |  34<H>  |  0.41<L>    Ca    9.0      30 Dec 2019 06:35    TPro  6.5  /  Alb  2.6<L>  /  TBili  0.3  /  DBili  x   /  AST  16  /  ALT  19  /  AlkPhos  106  12-29          RADIOLOGY & ADDITIONAL TESTS:

## 2019-12-30 NOTE — PROGRESS NOTE ADULT - PROBLEM SELECTOR PLAN 1
seen and examined, vs and meds reviewed, on o2 support, overnight events noted   check sat on room air   chronic lung disease - ILD - PTX -   thoracic eval noted - will need follow up -   I jesus  on spiriva and Albuterol PRN nebs -   vs and HD and Sat reviewed  supportive medical regimen - will follow and monitor -.   discussed plan of care  medical supportive management and care and assist with ADL  out of bed as tolerated  pulm - follows with Dr. Bettye Hazel in [facility]

## 2019-12-30 NOTE — DISCHARGE NOTE PROVIDER - NSDCMRMEDTOKEN_GEN_ALL_CORE_FT
Incruse Ellipta 62.5 mcg/inh inhalation powder: 1 puff(s) inhaled once a day  ondansetron 4 mg oral tablet: 1 tab(s) orally every 8 hours  OXcarbazepine 300 mg oral tablet: 1 tab(s) orally 2 times a day  pantoprazole 40 mg oral delayed release tablet: 1 tab(s) orally once a day (before a meal)  propranolol 20 mg oral tablet: 1 tab(s) orally 3 times a day

## 2019-12-30 NOTE — PROGRESS NOTE ADULT - PROBLEM SELECTOR PLAN 1
ct showing stercoral colitis  cont bacid  cont senna as tolerated  regular diet  monitor exam/gi fxn  up to date w colonoscopy

## 2019-12-30 NOTE — DISCHARGE NOTE PROVIDER - NSDCFUADDAPPT_GEN_ALL_CORE_FT
Please follow up with your pulmonologist, GI doctor, neurologist and GI doctor within 102 weeks of discharge.  You must make your own appointments.

## 2019-12-30 NOTE — PHYSICAL THERAPY INITIAL EVALUATION ADULT - GENERAL OBSERVATIONS, REHAB EVAL
patient received supine in bed, c/o fatigue agreeable to evaluation, mother present in room. telemetry and 02 2 liters intact , left arm contracted

## 2019-12-30 NOTE — PROGRESS NOTE ADULT - PROBLEM SELECTOR PLAN 1
moderate right pneumothorax, awaiting CXR read from today  continue to encourage incentive spirometry use  Cardiothoracic surgery, Dr. France consulted - continue daily CXR to monitor progress of PTX  Pulmonolgy, Dr. Rashid consulted - continue Spiriva and kaiden

## 2019-12-30 NOTE — PROGRESS NOTE ADULT - SUBJECTIVE AND OBJECTIVE BOX
Neurology follow up note    ROMIE [id number]      Interval History:    Patient feels ok no new complaints.    MEDICATIONS    acetaminophen   Tablet .. 650 milliGRAM(s) Oral every 6 hours PRN  ALBUTerol    0.083% 2.5 milliGRAM(s) Nebulizer every 6 hours PRN  enoxaparin Injectable 40 milliGRAM(s) SubCutaneous daily  influenza   Vaccine 0.5 milliLiter(s) IntraMuscular once  lactobacillus acidophilus 1 Tablet(s) Oral three times a day with meals  ondansetron Injectable 4 milliGRAM(s) IV Push every 8 hours PRN  OXcarbazepine 300 milliGRAM(s) Oral two times a day  pantoprazole    Tablet 40 milliGRAM(s) Oral before breakfast  propranolol 20 milliGRAM(s) Oral three times a day  senna 2 Tablet(s) Oral at bedtime  sucralfate 1 Gram(s) Oral two times a day  tiotropium 18 MICROgram(s) Capsule 1 Capsule(s) Inhalation daily      Allergies    IV Contrast (Anaphylaxis)  shellfish. (Anaphylaxis)    Intolerances            Vital Signs Last 24 Hrs  T(C): 36.5 (30 Dec 2019 07:15), Max: 36.7 (30 Dec 2019 00:08)  T(F): 97.7 (30 Dec 2019 07:15), Max: 98.1 (30 Dec 2019 00:08)  HR: 80 (30 Dec 2019 07:15) (71 - 101)  BP: 106/69 (30 Dec 2019 07:15) (91/61 - 139/88)  BP(mean): --  RR: 18 (30 Dec 2019 07:15) (17 - 18)  SpO2: 98% (30 Dec 2019 07:15) (95% - 98%)      REVIEW OF SYSTEMS:  Constitutional:  The patient denies fever, chills, or night sweats.  Head:  No headaches.  Eyes:  No double vision or blurry vision.  Ears:  No ringing in the ears.  Neck:  No neck pain.  Respiratory:  Occasional shortness of breath.  Cardiovascular:  Positive right-sided chest pain.  Abdomen:  No nausea, vomiting, or abdominal pain.  Extremities/Neurological:  No numbness or tingling.  Musculoskeletal:  Occasional joint pain.    PHYSICAL EXAMINATION:   HEENT:  Head:  Normocephalic, atraumatic.  Eyes:  No scleral icterus.  Ears:  Hearing bilaterally appeared to be intact.  NECK:  Supple.  RESPIRATORY:  Decreased breath sounds bilaterally, but most prominent on the right.  CARDIOVASCULAR:  S1 and S2 heard.  ABDOMEN:  Soft and nontender.  Extremities:  No clubbing or cyanosis were noted.      NEUROLOGIC:  The patient is awake and alert.  Location was hospital, year was 2019, month was December.  Was able to name objects.  Extraocular movements were intact.  Pupils were equal, round, and reactive bilaterally 3 mm to 2 mm.  Speech was fluent.  Smile was symmetric.  Motor:  Right upper was 5/5, left upper was 3/5, right lower was 4/5, left lowerwas 3+/5.  As per my conversation with the spouse, after her apparent event back in March, questionable seizure versus questionable stroke.  She was left with left-sided weakness.  Sensory:  Bilateral upper and lower appeared intact to light touch.                      LABS:  CBC Full  -  ( 30 Dec 2019 06:35 )  WBC Count : 8.89 K/uL  RBC Count : 4.09 M/uL  Hemoglobin : 11.7 g/dL  Hematocrit : 36.8 %  Platelet Count - Automated : 455 K/uL  Mean Cell Volume : 90.0 fl  Mean Cell Hemoglobin : 28.6 pg  Mean Cell Hemoglobin Concentration : 31.8 gm/dL  Auto Neutrophil # : x  Auto Lymphocyte # : x  Auto Monocyte # : x  Auto Eosinophil # : x  Auto Basophil # : x  Auto Neutrophil % : x  Auto Lymphocyte % : x  Auto Monocyte % : x  Auto Eosinophil % : x  Auto Basophil % : x      12-30    138  |  98  |  8   ----------------------------<  119<H>  3.7   |  34<H>  |  0.41<L>    Ca    9.0      30 Dec 2019 06:35    TPro  6.5  /  Alb  2.6<L>  /  TBili  0.3  /  DBili  x   /  AST  16  /  ALT  19  /  AlkPhos  106  12-29    Hemoglobin A1C:     LIVER FUNCTIONS - ( 29 Dec 2019 07:38 )  Alb: 2.6 g/dL / Pro: 6.5 g/dL / ALK PHOS: 106 U/L / ALT: 19 U/L / AST: 16 U/L / GGT: x           Vitamin B12         RADIOLOGY    ANALYSIS AND PLAN:  This is a 59-year-old with a history of possibly epilepsy verse TIAs, history of chronic subdural hydromas and change in mental status.  1.	For episode of change in mental status, as per my conversation with the spouse, these appear to occur primarily at the same time at night for the last three to four weeks.  The patient has been on Trileptal for about eight to nine months.  Suspect less likely this is Trileptal, but I will check Trileptal levels.  Questionable, the patient could have any type of sleep-related disorder causing these events to occur at night.  I spoke with the spouse, the patient should undergo sleep studies.  2.	For history of chronic subdural hematoma, these appeared to have resolved from previous MRI.  3.	For history of possible underlying epilepsy, for now, I will continue the patient on her Trileptal.  4.	For episode of left-sided hemipareses of unclear etiology, as per my conversation with the spouse, there was a question that this was from a seizure event versus possibly a TIA even though MRI did not show cerebrovascular accident.  Unclear, the patient has any type of underlying neurodegenerative disease that could be explaining this.  The patient was seen at [facility] and does follow up with an outside neurologist.  5.	Place the call to the patient's outside neurologist Dr. Wallace.  He is off today, we will re-attempt to contact him tomorrow.  His telephone number is [phone].  6.	Spouse's name is Marialuisa, his telephone number is [phone].    Thank you for the courtesy of this consultation.    Physical therapy evaluation if possible and as tolerated.  OOB to chair/ambulation with assistance only if possible.    Greater than 45 minutes spent in direct patient care reviewing  the notes, lab data/ imaging , discussion with multidisciplinary team.

## 2019-12-30 NOTE — PROGRESS NOTE ADULT - PROBLEM SELECTOR PLAN 2
multifactorial  if zofran ineffective, rec trial of reglan as qtc allows  cont ppi, will add carafate bid  recent egd w gastritis

## 2019-12-30 NOTE — PHYSICAL THERAPY INITIAL EVALUATION ADULT - ADDITIONAL COMMENTS
patient lives in private ranch style home with mother and daughter. Pt states that she aleman not use a cane or walker to walk. Pt has a splint for her left hand. Pt is right hand dominant

## 2019-12-30 NOTE — PROGRESS NOTE ADULT - PROBLEM SELECTOR PLAN 2
UA: moderate LEC, WBC 26-50, moderate bacteria. Patient reports foul smell, currently using diapers due to feeling weak  -s/p IV Rocephin in the ED. 3 day course of rocephin completed  -Follow up urine culture

## 2019-12-30 NOTE — PROGRESS NOTE ADULT - PROBLEM SELECTOR PLAN 6
chronic  -Continue Oxcarbazepine BID  -Follow up Oxcarbazepine level  -Patient states her antiseizure meds are making her "feel weird"  -Neurology, Dr. Mccollum consulted

## 2019-12-31 LAB
ANION GAP SERPL CALC-SCNC: 3 MMOL/L — LOW (ref 5–17)
BUN SERPL-MCNC: 11 MG/DL — SIGNIFICANT CHANGE UP (ref 7–23)
CALCIUM SERPL-MCNC: 8.9 MG/DL — SIGNIFICANT CHANGE UP (ref 8.5–10.1)
CHLORIDE SERPL-SCNC: 98 MMOL/L — SIGNIFICANT CHANGE UP (ref 96–108)
CO2 SERPL-SCNC: 37 MMOL/L — HIGH (ref 22–31)
CREAT SERPL-MCNC: 0.58 MG/DL — SIGNIFICANT CHANGE UP (ref 0.5–1.3)
CULTURE RESULTS: SIGNIFICANT CHANGE UP
CULTURE RESULTS: SIGNIFICANT CHANGE UP
GLUCOSE SERPL-MCNC: 107 MG/DL — HIGH (ref 70–99)
HCT VFR BLD CALC: 37.2 % — SIGNIFICANT CHANGE UP (ref 34.5–45)
HGB BLD-MCNC: 11.6 G/DL — SIGNIFICANT CHANGE UP (ref 11.5–15.5)
MCHC RBC-ENTMCNC: 28.4 PG — SIGNIFICANT CHANGE UP (ref 27–34)
MCHC RBC-ENTMCNC: 31.2 GM/DL — LOW (ref 32–36)
MCV RBC AUTO: 91 FL — SIGNIFICANT CHANGE UP (ref 80–100)
NRBC # BLD: 0 /100 WBCS — SIGNIFICANT CHANGE UP (ref 0–0)
PLATELET # BLD AUTO: 453 K/UL — HIGH (ref 150–400)
POTASSIUM SERPL-MCNC: 3.6 MMOL/L — SIGNIFICANT CHANGE UP (ref 3.5–5.3)
POTASSIUM SERPL-SCNC: 3.6 MMOL/L — SIGNIFICANT CHANGE UP (ref 3.5–5.3)
RBC # BLD: 4.09 M/UL — SIGNIFICANT CHANGE UP (ref 3.8–5.2)
RBC # FLD: 14.4 % — SIGNIFICANT CHANGE UP (ref 10.3–14.5)
SODIUM SERPL-SCNC: 138 MMOL/L — SIGNIFICANT CHANGE UP (ref 135–145)
SPECIMEN SOURCE: SIGNIFICANT CHANGE UP
SPECIMEN SOURCE: SIGNIFICANT CHANGE UP
WBC # BLD: 9.08 K/UL — SIGNIFICANT CHANGE UP (ref 3.8–10.5)
WBC # FLD AUTO: 9.08 K/UL — SIGNIFICANT CHANGE UP (ref 3.8–10.5)

## 2019-12-31 PROCEDURE — 99233 SBSQ HOSP IP/OBS HIGH 50: CPT | Mod: GC

## 2019-12-31 PROCEDURE — 71045 X-RAY EXAM CHEST 1 VIEW: CPT | Mod: 26

## 2019-12-31 RX ADMIN — ONDANSETRON 4 MILLIGRAM(S): 8 TABLET, FILM COATED ORAL at 18:47

## 2019-12-31 RX ADMIN — Medication 1 GRAM(S): at 05:29

## 2019-12-31 RX ADMIN — PANTOPRAZOLE SODIUM 40 MILLIGRAM(S): 20 TABLET, DELAYED RELEASE ORAL at 05:28

## 2019-12-31 RX ADMIN — TIOTROPIUM BROMIDE 1 CAPSULE(S): 18 CAPSULE ORAL; RESPIRATORY (INHALATION) at 12:59

## 2019-12-31 RX ADMIN — ENOXAPARIN SODIUM 40 MILLIGRAM(S): 100 INJECTION SUBCUTANEOUS at 13:00

## 2019-12-31 RX ADMIN — Medication 1 TABLET(S): at 09:19

## 2019-12-31 RX ADMIN — OXCARBAZEPINE 300 MILLIGRAM(S): 300 TABLET, FILM COATED ORAL at 09:20

## 2019-12-31 RX ADMIN — ONDANSETRON 4 MILLIGRAM(S): 8 TABLET, FILM COATED ORAL at 10:31

## 2019-12-31 RX ADMIN — OXCARBAZEPINE 300 MILLIGRAM(S): 300 TABLET, FILM COATED ORAL at 21:10

## 2019-12-31 RX ADMIN — Medication 1 TABLET(S): at 13:00

## 2019-12-31 NOTE — PROGRESS NOTE ADULT - PROBLEM SELECTOR PLAN 1
moderate right pneumothorax, improved on today's CXR  continue to encourage incentive spirometry use  saturating 93-99% on 2L nasal canula (pt. occasionally uses this at home) will attempt to wean to room air as tolerated  out of bed as tolerated  Cardiothoracic surgery, Dr. France consulted - continue daily CXR to monitor progress of PTX  Pulmonolgy, Dr. Rashid consulted - continue Spiriva and duonebs

## 2019-12-31 NOTE — OCCUPATIONAL THERAPY INITIAL EVALUATION ADULT - RANGE OF MOTION EXAMINATION, UPPER EXTREMITY
Right UE Active ROM was WFL (within functional limits)/Left shoulder flex/abd limited 2/2 tone, AAROM approx 0-90 degrees. LUE elbow flex/ext WFL; able to flex MCP/PIPs but unable to actively extend, unable to actively move thumb (limited by spasticity); PROM WFL wrist/digits and pt positioned in resting hand splint. Sensation grossly intact to light touch. Pt is right hand dominant.

## 2019-12-31 NOTE — OCCUPATIONAL THERAPY INITIAL EVALUATION ADULT - TRANSFER TRAINING, PT EVAL
pt will improve sit to/from stands to supervision in prep for safe commode transfers within 3-5 sessions

## 2019-12-31 NOTE — PROGRESS NOTE ADULT - PROBLEM SELECTOR PLAN 1
seen and examined, vs and meds reviewed, on o2 support, overnight events noted   check sat on room air   chronic lung disease - ILD - PTX - (ptx unchanged - stable HD and Resp Status)  thoracic eval noted - will need follow up - as outpatient -   I jesus  on spiriva and Albuterol PRN nebs -   vs and HD and Sat reviewed  supportive medical regimen - will follow and monitor -.   discussed plan of care  medical supportive management and care and assist with ADL  out of bed as tolerated  pulm - follows with Dr. Bettye Hazel in [facility].

## 2019-12-31 NOTE — PROGRESS NOTE ADULT - SUBJECTIVE AND OBJECTIVE BOX
Neurology follow up note    ROMIE [id number]      Interval History:    Patient feels ok no new complaints.    MEDICATIONS    acetaminophen   Tablet .. 650 milliGRAM(s) Oral every 6 hours PRN  ALBUTerol    0.083% 2.5 milliGRAM(s) Nebulizer every 6 hours PRN  enoxaparin Injectable 40 milliGRAM(s) SubCutaneous daily  influenza   Vaccine 0.5 milliLiter(s) IntraMuscular once  lactobacillus acidophilus 1 Tablet(s) Oral three times a day with meals  ondansetron Injectable 4 milliGRAM(s) IV Push every 8 hours PRN  OXcarbazepine 300 milliGRAM(s) Oral two times a day  pantoprazole    Tablet 40 milliGRAM(s) Oral before breakfast  propranolol 20 milliGRAM(s) Oral three times a day  senna 2 Tablet(s) Oral at bedtime  sucralfate 1 Gram(s) Oral two times a day  tiotropium 18 MICROgram(s) Capsule 1 Capsule(s) Inhalation daily      Allergies    IV Contrast (Anaphylaxis)  shellfish. (Anaphylaxis)    Intolerances            Vital Signs Last 24 Hrs  T(C): 36.6 (31 Dec 2019 07:18), Max: 37.2 (30 Dec 2019 20:45)  T(F): 97.9 (31 Dec 2019 07:18), Max: 98.9 (30 Dec 2019 20:45)  HR: 74 (31 Dec 2019 07:18) (65 - 118)  BP: 102/68 (31 Dec 2019 07:18) (93/62 - 127/81)  BP(mean): --  RR: 20 (31 Dec 2019 07:18) (17 - 20)  SpO2: 93% (31 Dec 2019 07:18) (93% - 99%)    REVIEW OF SYSTEMS:  Constitutional:  The patient denies fever, chills, or night sweats.  Head:  No headaches.  Eyes:  No double vision or blurry vision.  Ears:  No ringing in the ears.  Neck:  No neck pain.  Respiratory:  Occasional shortness of breath.  Cardiovascular:  Positive right-sided chest pain.  Abdomen:  No nausea, vomiting, or abdominal pain.  Extremities/Neurological:  No numbness or tingling.  Musculoskeletal:  Occasional joint pain.    PHYSICAL EXAMINATION:   HEENT:  Head:  Normocephalic, atraumatic.  Eyes:  No scleral icterus.  Ears:  Hearing bilaterally appeared to be intact.  NECK:  Supple.  RESPIRATORY:  Decreased breath sounds bilaterally, but most prominent on the right.  CARDIOVASCULAR:  S1 and S2 heard.  ABDOMEN:  Soft and nontender.  Extremities:  No clubbing or cyanosis were noted.      NEUROLOGIC:  The patient is awake and alert.  Location was hospital, year was 2019, month was December.  Was able to name objects.  Extraocular movements were intact.  Pupils were equal, round, and reactive bilaterally 3 mm to 2 mm.  Speech was fluent.  Smile was symmetric.  Motor:  Right upper was 5/5, left upper was 3/5, right lower was 4/5, left lower was 3+/5.  As per my conversation with the spouse, after her apparent event back in March, questionable seizure versus questionable stroke.  She was left with left-sided weakness.  Sensory:  Bilateral upper and lower appeared intact to light touch.                  LABS:  CBC Full  -  ( 31 Dec 2019 06:30 )  WBC Count : 9.08 K/uL  RBC Count : 4.09 M/uL  Hemoglobin : 11.6 g/dL  Hematocrit : 37.2 %  Platelet Count - Automated : 453 K/uL  Mean Cell Volume : 91.0 fl  Mean Cell Hemoglobin : 28.4 pg  Mean Cell Hemoglobin Concentration : 31.2 gm/dL  Auto Neutrophil # : x  Auto Lymphocyte # : x  Auto Monocyte # : x  Auto Eosinophil # : x  Auto Basophil # : x  Auto Neutrophil % : x  Auto Lymphocyte % : x  Auto Monocyte % : x  Auto Eosinophil % : x  Auto Basophil % : x      12-31    138  |  98  |  11  ----------------------------<  107<H>  3.6   |  37<H>  |  0.58    Ca    8.9      31 Dec 2019 06:30      Hemoglobin A1C:       Vitamin B12         RADIOLOGY      of chronic subdural hydromas and change in mental status.  1.	For episode of change in mental status, as per my conversation with the spouse, these appear to occur primarily at the same time at night for the last three to four weeks.  The patient has been on Trileptal for about eight to nine months.  Suspect less likely this is Trileptal, but I will check Trileptal levels.  Questionable, the patient could have any type of sleep-related disorder causing these events to occur at night.  I spoke with the spouse, the patient should undergo sleep studies.  2.	For history of chronic subdural hematoma, these appeared to have resolved from previous MRI.  3.	For history of possible underlying epilepsy, for now, I will continue the patient on her Trileptal.  4.	For episode of left-sided hemipareses of unclear etiology, as per my conversation with the spouse, there was a question that this was from a seizure event versus possibly a TIA even though MRI did not show cerebrovascular accident.  Unclear, the patient has any type of underlying neurodegenerative disease that could be explaining this.  The patient was seen at [place] and does follow up with an outside neurologist.  5.	Monitor CO2 levels as needed  6.	Place the call to the patient's outside neurologist Dr. Wallace.  left message awaiting call backHis telephone number is [phone].  7.	Spouse's name is Marialuisa, his telephone number is [phone].    Thank you for the courtesy of this consultation.    Physical therapy evaluation if possible and as tolerated.  OOB to chair/ambulation with assistance only if possible.    Greater than 40 minutes spent in direct patient care reviewing  the notes, lab data/ imaging , discussion with multidisciplinary team. Neurology follow up note    ROMIE [id number]      Interval History:    Patient feels ok no new complaints.    MEDICATIONS    acetaminophen   Tablet .. 650 milliGRAM(s) Oral every 6 hours PRN  ALBUTerol    0.083% 2.5 milliGRAM(s) Nebulizer every 6 hours PRN  enoxaparin Injectable 40 milliGRAM(s) SubCutaneous daily  influenza   Vaccine 0.5 milliLiter(s) IntraMuscular once  lactobacillus acidophilus 1 Tablet(s) Oral three times a day with meals  ondansetron Injectable 4 milliGRAM(s) IV Push every 8 hours PRN  OXcarbazepine 300 milliGRAM(s) Oral two times a day  pantoprazole    Tablet 40 milliGRAM(s) Oral before breakfast  propranolol 20 milliGRAM(s) Oral three times a day  senna 2 Tablet(s) Oral at bedtime  sucralfate 1 Gram(s) Oral two times a day  tiotropium 18 MICROgram(s) Capsule 1 Capsule(s) Inhalation daily      Allergies    IV Contrast (Anaphylaxis)  shellfish. (Anaphylaxis)    Intolerances            Vital Signs Last 24 Hrs  T(C): 36.6 (31 Dec 2019 07:18), Max: 37.2 (30 Dec 2019 20:45)  T(F): 97.9 (31 Dec 2019 07:18), Max: 98.9 (30 Dec 2019 20:45)  HR: 74 (31 Dec 2019 07:18) (65 - 118)  BP: 102/68 (31 Dec 2019 07:18) (93/62 - 127/81)  BP(mean): --  RR: 20 (31 Dec 2019 07:18) (17 - 20)  SpO2: 93% (31 Dec 2019 07:18) (93% - 99%)    REVIEW OF SYSTEMS:  Constitutional:  The patient denies fever, chills, or night sweats.  Head:  No headaches.  Eyes:  No double vision or blurry vision.  Ears:  No ringing in the ears.  Neck:  No neck pain.  Respiratory:  Occasional shortness of breath.  Cardiovascular:  Positive right-sided chest pain.  Abdomen:  No nausea, vomiting, or abdominal pain.  Extremities/Neurological:  No numbness or tingling.  Musculoskeletal:  Occasional joint pain.    PHYSICAL EXAMINATION:   HEENT:  Head:  Normocephalic, atraumatic.  Eyes:  No scleral icterus.  Ears:  Hearing bilaterally appeared to be intact.  NECK:  Supple.  RESPIRATORY:  Decreased breath sounds bilaterally, but most prominent on the right.  CARDIOVASCULAR:  S1 and S2 heard.  ABDOMEN:  Soft and nontender.  Extremities:  No clubbing or cyanosis were noted.      NEUROLOGIC:  The patient is awake and alert.  Location was hospital, year was 2019, month was December.  Was able to name objects.  Extraocular movements were intact.  Pupils were equal, round, and reactive bilaterally 3 mm to 2 mm.  Speech was fluent.  Smile was symmetric.  Motor:  Right upper was 5/5, left upper was 3/5, right lower was 4/5, left lower was 3+/5.  As per my conversation with the spouse, after her apparent event back in March, questionable seizure versus questionable stroke.  She was left with left-sided weakness.  Sensory:  Bilateral upper and lower appeared intact to light touch.                  LABS:  CBC Full  -  ( 31 Dec 2019 06:30 )  WBC Count : 9.08 K/uL  RBC Count : 4.09 M/uL  Hemoglobin : 11.6 g/dL  Hematocrit : 37.2 %  Platelet Count - Automated : 453 K/uL  Mean Cell Volume : 91.0 fl  Mean Cell Hemoglobin : 28.4 pg  Mean Cell Hemoglobin Concentration : 31.2 gm/dL  Auto Neutrophil # : x  Auto Lymphocyte # : x  Auto Monocyte # : x  Auto Eosinophil # : x  Auto Basophil # : x  Auto Neutrophil % : x  Auto Lymphocyte % : x  Auto Monocyte % : x  Auto Eosinophil % : x  Auto Basophil % : x      12-31    138  |  98  |  11  ----------------------------<  107<H>  3.6   |  37<H>  |  0.58    Ca    8.9      31 Dec 2019 06:30      Hemoglobin A1C:       Vitamin B12         RADIOLOGY      ANALYSIS AND PLAN:  This is a 59-year-old with a history of possibly epilepsy verse TIAs, history of chronic subdural hydromas and change in mental status.  1.	For episode of change in mental status, as per my conversation with the spouse, these appear to occur primarily at the same time at night for the last three to four weeks.  The patient has been on Trileptal for about eight to nine months.  Suspect less likely this is Trileptal, but I will check Trileptal levels.  Questionable, the patient could have any type of sleep-related disorder causing these events to occur at night.  I spoke with the spouse, the patient should undergo sleep studies.  2.	For history of chronic subdural hematoma, these appeared to have resolved from previous MRI.  3.	For history of possible underlying epilepsy, for now, I will continue the patient on her Trileptal.  4.	For episode of left-sided hemipareses of unclear etiology, as per my conversation with the spouse, there was a question that this was from a seizure event versus possibly a TIA even though MRI did not show cerebrovascular accident.  Unclear, the patient has any type of underlying neurodegenerative disease that could be explaining this.  The patient was seen at [place] and does follow up with an outside neurologist.  5.	Monitor CO2 levels as needed  6.	Place the call to the patient's outside neurologist Dr. Wallace.  left message awaiting call backHis telephone number is [phone].  7.	Spouse's name is Marialuisa, his telephone number is [phone].    Thank you for the courtesy of this consultation.    Physical therapy evaluation if possible and as tolerated.  OOB to chair/ambulation with assistance only if possible.    Greater than 40 minutes spent in direct patient care reviewing  the notes, lab data/ imaging , discussion with multidisciplinary team.

## 2019-12-31 NOTE — OCCUPATIONAL THERAPY INITIAL EVALUATION ADULT - NS ASR OT EQUIP NEEDS DISCH
pt has RW, w/c, commode, shower chair, home O2, LUE resting hand splint; no additional recs at this time

## 2019-12-31 NOTE — OCCUPATIONAL THERAPY INITIAL EVALUATION ADULT - IADL RETRAINING, OT EVAL
Patient will increase standing tolerance to 1-2 minutes to promote safe commode transfers within 3-5 sessions.

## 2019-12-31 NOTE — OCCUPATIONAL THERAPY INITIAL EVALUATION ADULT - PERTINENT HX OF CURRENT PROBLEM, REHAB EVAL
Pt is a 58 y/o female with h/o CVA (3/19) admitted 12/26/19 with c/o weakness, weight loss, nausea, and diarrhea for the past month. Patient admits to 4 lb weight loss in 3 weeks, was seen by her PCP and started on Zofran one week ago. Patient reports going to wound care prior to admission for bed sores, and was advised ED evaluation. Dx: right pneumothorax, UTI, colitis.

## 2019-12-31 NOTE — PROGRESS NOTE ADULT - SUBJECTIVE AND OBJECTIVE BOX
Date/Time Patient Seen:  		  Referring MD:   Data Reviewed	       Patient is a 59y old  Female who presents with a chief complaint of pneumothorax, colitis, UTI (30 Dec 2019 16:11)      Subjective/HPI     PAST MEDICAL & SURGICAL HISTORY:  Interstitial lung disease: on home o2 prn  NHL (non-Hodgkin's lymphoma): Agem 43 sp chemo/rt/stem cell  Transient cerebral ischemia, unspecified type  Mitral prolapse  Pulmonary disease  History of tonsillectomy  History of appendectomy        Medication list         MEDICATIONS  (STANDING):  enoxaparin Injectable 40 milliGRAM(s) SubCutaneous daily  influenza   Vaccine 0.5 milliLiter(s) IntraMuscular once  lactobacillus acidophilus 1 Tablet(s) Oral three times a day with meals  OXcarbazepine 300 milliGRAM(s) Oral two times a day  pantoprazole    Tablet 40 milliGRAM(s) Oral before breakfast  propranolol 20 milliGRAM(s) Oral three times a day  senna 2 Tablet(s) Oral at bedtime  sucralfate 1 Gram(s) Oral two times a day  tiotropium 18 MICROgram(s) Capsule 1 Capsule(s) Inhalation daily    MEDICATIONS  (PRN):  acetaminophen   Tablet .. 650 milliGRAM(s) Oral every 6 hours PRN Temp greater or equal to 38C (100.4F), Moderate Pain (4 - 6)  ALBUTerol    0.083% 2.5 milliGRAM(s) Nebulizer every 6 hours PRN Shortness of Breath and/or Wheezing  ondansetron Injectable 4 milliGRAM(s) IV Push every 8 hours PRN Nausea and/or Vomiting         Vitals log        ICU Vital Signs Last 24 Hrs  T(C): 36.6 (31 Dec 2019 07:18), Max: 37.2 (30 Dec 2019 20:45)  T(F): 97.9 (31 Dec 2019 07:18), Max: 98.9 (30 Dec 2019 20:45)  HR: 74 (31 Dec 2019 07:18) (65 - 118)  BP: 102/68 (31 Dec 2019 07:18) (93/62 - 127/81)  BP(mean): --  ABP: --  ABP(mean): --  RR: 20 (31 Dec 2019 07:18) (17 - 20)  SpO2: 93% (31 Dec 2019 07:18) (93% - 99%)           Input and Output:  I&O's Detail    30 Dec 2019 07:01  -  31 Dec 2019 07:00  --------------------------------------------------------  IN:    Oral Fluid: 120 mL  Total IN: 120 mL    OUT:    Voided: 500 mL  Total OUT: 500 mL    Total NET: -380 mL          Lab Data                        11.6   9.08  )-----------( 453      ( 31 Dec 2019 06:30 )             37.2     12-31    138  |  98  |  11  ----------------------------<  107<H>  3.6   |  37<H>  |  0.58    Ca    8.9      31 Dec 2019 06:30    TPro  6.5  /  Alb  2.6<L>  /  TBili  0.3  /  DBili  x   /  AST  16  /  ALT  19  /  AlkPhos  106  12-29            Review of Systems	      Objective     Physical Examination    head at  heart s1s2  lung dec BS  abd soft      Pertinent Lab findings & Imaging      Shauna:  NO   Adequate UO     I&O's Detail    30 Dec 2019 07:01  -  31 Dec 2019 07:00  --------------------------------------------------------  IN:    Oral Fluid: 120 mL  Total IN: 120 mL    OUT:    Voided: 500 mL  Total OUT: 500 mL    Total NET: -380 mL               Discussed with:     Cultures:	        Radiology

## 2019-12-31 NOTE — OCCUPATIONAL THERAPY INITIAL EVALUATION ADULT - ADL RETRAINING, OT EVAL
pt will complete UB dressing with minimal assistance using compensatory techniques within 3-5 sessions

## 2019-12-31 NOTE — OCCUPATIONAL THERAPY INITIAL EVALUATION ADULT - PRECAUTIONS/LIMITATIONS, REHAB EVAL
2L via NC/oxygen therapy device and L/min/fall precautions/aspiration precautions/seizure precautions

## 2019-12-31 NOTE — OCCUPATIONAL THERAPY INITIAL EVALUATION ADULT - ADDITIONAL COMMENTS
Pt lives with her dtr and  in a private home with 3 steps to enter, no rail. Bathroom has a stall shower with chair, (+) commode. Pt c/o feeling weaker the past few weeks requiring w/c and mostly bedbound. Prior to this, pt was able to ambulate short distances (refused to use AD but has RW). Pt was using commode in bathroom, had assistance for sponge bathing in bed, brushed teeth and ate in bed. Dtr is pt's caregiver during the day and  is home to assist at night. Pt has resting hand splint for LUE. She was receiving outpatient OT for LUE 2x/wk PTA. Pt uses home O2 2L/min via NC.

## 2019-12-31 NOTE — PROGRESS NOTE ADULT - PROBLEM SELECTOR PLAN 2
UA: moderate LEC, WBC 26-50, moderate bacteria. Patient reports foul smell, currently using diapers due to feeling weak  -s/p IV Rocephin in the ED. 3 day course of rocephin completed

## 2019-12-31 NOTE — PROGRESS NOTE ADULT - PROBLEM SELECTOR PLAN 4
patient reports chronic nausea for the past month. Had an EGD/Colonoscopy this year that was "negative" per patient  -Zofran IV PRN for nausea, if not improving ok to try reglan as long as QT permits as per GI recs  -Nutrition recommended diet  -GI, Dr. De La Vega, following

## 2019-12-31 NOTE — PROGRESS NOTE ADULT - SUBJECTIVE AND OBJECTIVE BOX
Patient is a 59y old  Female who presents with a chief complaint of pneumothorax, colitis, UTI (31 Dec 2019 08:35)    ----  INTERVAL HPI/OVERNIGHT EVENTS: Pt seen and evaluated at the bedside. No acute overnight events occurred. Patient admits to feeling generalized weakness and fatigue still. Denies any shortness of breath or chest pain. She is still on 2L nasal canula and saturating between 93-99%. She has been unable to tolerate being on RA. States that she has not been using her incentive spirometry    ----  PAST MEDICAL & SURGICAL HISTORY:  Interstitial lung disease: on home o2 prn  NHL (non-Hodgkin's lymphoma): Agem 43 sp chemo/rt/stem cell  Transient cerebral ischemia, unspecified type  Mitral prolapse  History of tonsillectomy  History of appendectomy      FAMILY HISTORY:  Family history of stroke  Family history of breast cancer: Mother      Allergies    IV Contrast (Anaphylaxis)  shellfish. (Anaphylaxis)    Intolerances        ----  REVIEW OF SYSTEMS:  CONSTITUTIONAL: denies fever, chills, admits fatigue andweakness  HEENT: denies blurred vision, sore throat  SKIN: denies new lesions, rash  CARDIOVASCULAR: denies chest pain, chest pressure, palpitations  RESPIRATORY: denies shortness of breath, sputum production  GASTROINTESTINAL: denies nausea, vomiting, diarrhea, abdominal pain  GENITOURINARY: denies dysuria, discharge  NEUROLOGICAL: denies numbness, headache, focal weakness  MUSCULOSKELETAL: denies new joint pain, muscle aches  HEMATOLOGIC: denies gross bleeding, bruising  LYMPHATICS: denies enlarged lymph nodes, extremity swelling    ----  PHYSICAL EXAM:  GENERAL: patient appears fatigued, no acute distress, appropriately interactive, on 2L nasal canula  EYES: sclera clear, no exudates  ENMT: oropharynx clear without erythema, moist mucous membranes  NECK: supple, soft, no thyromegaly noted  LUNGS: diminished breath sounds in right lung field, clear to auscultation, no wheezing or rhonchi appreciated  HEART: soft S1/S2, regular rate and rhythm, no murmurs noted, no noted edema to b/l LE  GASTROINTESTINAL: abdomen is soft, nontender, nondistended, normoactive bowel sounds, no palpable masses  INTEGUMENT: good skin turgor, appropriate for ethnicity, appears well perfused, no jaundice noted  MUSCULOSKELETAL: no clubbing or cyanosis, no obvious deformity  NEUROLOGIC: awake, alert, oriented x3, good muscle tone in 4 extremities, + left arm paralysis and left leg weakness, no obvious sensory deficits  PSYCHIATRIC: mood is good, affect is congruent with mood, linear and logical thought process  HEME/LYMPH: no palpable supraclavicular nodules, no obvious ecchymosis     T(C): 36.6 (12-31-19 @ 07:18), Max: 37.2 (12-30-19 @ 20:45)  HR: 74 (12-31-19 @ 07:18) (65 - 118)  BP: 102/68 (12-31-19 @ 07:18) (102/68 - 127/81)  RR: 20 (12-31-19 @ 07:18) (17 - 20)  SpO2: 93% (12-31-19 @ 07:18) (93% - 99%)  Wt(kg): --    ----  I&O's Summary    30 Dec 2019 07:01  -  31 Dec 2019 07:00  --------------------------------------------------------  IN: 220 mL / OUT: 500 mL / NET: -280 mL        LABS:                        11.6   9.08  )-----------( 453      ( 31 Dec 2019 06:30 )             37.2     12-31    138  |  98  |  11  ----------------------------<  107<H>  3.6   |  37<H>  |  0.58    Ca    8.9      31 Dec 2019 06:30          CAPILLARY BLOOD GLUCOSE          12-27 @ 20:58   No enteric pathogens isolated.  (Stool culture examined for Salmonella,  Shigella, Campylobacter, Aeromonas, Plesiomonas,  Vibrio, E.coli O157 and Yersinia)  --  --            ----

## 2019-12-31 NOTE — PROGRESS NOTE ADULT - PROBLEM SELECTOR PLAN 3
CT abd/pelvis: Large amount of stool within the rectum with surrounding inflammatory change. Stercoral colitis.  -s/p 3 day course of IV flagyl   -continue Senna at bedtime  -Bacid TID  - continue PPI  - continue carafate  -GI, Dr. Hutchinson following

## 2019-12-31 NOTE — PROGRESS NOTE ADULT - PROBLEM SELECTOR PLAN 6
chronic  - Continue Oxcarbazepine BID  - Oxcarbazepine level - 20 (normal), will continue with current dosing at this time  - Neurology, Dr. Mccollum following

## 2020-01-01 LAB
ANION GAP SERPL CALC-SCNC: 3 MMOL/L — LOW (ref 5–17)
BUN SERPL-MCNC: 14 MG/DL — SIGNIFICANT CHANGE UP (ref 7–23)
CALCIUM SERPL-MCNC: 9.2 MG/DL — SIGNIFICANT CHANGE UP (ref 8.5–10.1)
CHLORIDE SERPL-SCNC: 98 MMOL/L — SIGNIFICANT CHANGE UP (ref 96–108)
CO2 SERPL-SCNC: 38 MMOL/L — HIGH (ref 22–31)
CREAT SERPL-MCNC: 0.53 MG/DL — SIGNIFICANT CHANGE UP (ref 0.5–1.3)
GLUCOSE SERPL-MCNC: 121 MG/DL — HIGH (ref 70–99)
HCT VFR BLD CALC: 37 % — SIGNIFICANT CHANGE UP (ref 34.5–45)
HGB BLD-MCNC: 11.6 G/DL — SIGNIFICANT CHANGE UP (ref 11.5–15.5)
MCHC RBC-ENTMCNC: 28.9 PG — SIGNIFICANT CHANGE UP (ref 27–34)
MCHC RBC-ENTMCNC: 31.4 GM/DL — LOW (ref 32–36)
MCV RBC AUTO: 92 FL — SIGNIFICANT CHANGE UP (ref 80–100)
NRBC # BLD: 0 /100 WBCS — SIGNIFICANT CHANGE UP (ref 0–0)
PLATELET # BLD AUTO: 482 K/UL — HIGH (ref 150–400)
POTASSIUM SERPL-MCNC: 3.7 MMOL/L — SIGNIFICANT CHANGE UP (ref 3.5–5.3)
POTASSIUM SERPL-SCNC: 3.7 MMOL/L — SIGNIFICANT CHANGE UP (ref 3.5–5.3)
RBC # BLD: 4.02 M/UL — SIGNIFICANT CHANGE UP (ref 3.8–5.2)
RBC # FLD: 14.5 % — SIGNIFICANT CHANGE UP (ref 10.3–14.5)
SODIUM SERPL-SCNC: 139 MMOL/L — SIGNIFICANT CHANGE UP (ref 135–145)
WBC # BLD: 9.14 K/UL — SIGNIFICANT CHANGE UP (ref 3.8–10.5)
WBC # FLD AUTO: 9.14 K/UL — SIGNIFICANT CHANGE UP (ref 3.8–10.5)

## 2020-01-01 PROCEDURE — 99232 SBSQ HOSP IP/OBS MODERATE 35: CPT

## 2020-01-01 PROCEDURE — 71045 X-RAY EXAM CHEST 1 VIEW: CPT | Mod: 26

## 2020-01-01 RX ORDER — HYDROCORTISONE 1 %
1 OINTMENT (GRAM) TOPICAL AT BEDTIME
Refills: 0 | Status: DISCONTINUED | OUTPATIENT
Start: 2020-01-01 | End: 2020-01-02

## 2020-01-01 RX ORDER — LIDOCAINE 4 G/100G
1 CREAM TOPICAL
Refills: 0 | Status: DISCONTINUED | OUTPATIENT
Start: 2020-01-01 | End: 2020-01-02

## 2020-01-01 RX ORDER — HYDROCORTISONE 1 %
1 OINTMENT (GRAM) TOPICAL
Refills: 0 | Status: DISCONTINUED | OUTPATIENT
Start: 2020-01-01 | End: 2020-01-24

## 2020-01-01 RX ADMIN — Medication 650 MILLIGRAM(S): at 07:21

## 2020-01-01 RX ADMIN — Medication 650 MILLIGRAM(S): at 19:17

## 2020-01-01 RX ADMIN — TIOTROPIUM BROMIDE 1 CAPSULE(S): 18 CAPSULE ORAL; RESPIRATORY (INHALATION) at 13:32

## 2020-01-01 RX ADMIN — OXCARBAZEPINE 300 MILLIGRAM(S): 300 TABLET, FILM COATED ORAL at 09:11

## 2020-01-01 RX ADMIN — Medication 650 MILLIGRAM(S): at 06:51

## 2020-01-01 RX ADMIN — Medication 1 TABLET(S): at 09:11

## 2020-01-01 RX ADMIN — Medication 1 APPLICATION(S): at 09:15

## 2020-01-01 RX ADMIN — OXCARBAZEPINE 300 MILLIGRAM(S): 300 TABLET, FILM COATED ORAL at 22:56

## 2020-01-01 RX ADMIN — Medication 650 MILLIGRAM(S): at 18:47

## 2020-01-01 RX ADMIN — ENOXAPARIN SODIUM 40 MILLIGRAM(S): 100 INJECTION SUBCUTANEOUS at 13:33

## 2020-01-01 RX ADMIN — LIDOCAINE 1 APPLICATION(S): 4 CREAM TOPICAL at 20:47

## 2020-01-01 RX ADMIN — Medication 1 SUPPOSITORY(S): at 20:46

## 2020-01-01 RX ADMIN — ONDANSETRON 4 MILLIGRAM(S): 8 TABLET, FILM COATED ORAL at 10:49

## 2020-01-01 RX ADMIN — PANTOPRAZOLE SODIUM 40 MILLIGRAM(S): 20 TABLET, DELAYED RELEASE ORAL at 06:52

## 2020-01-01 RX ADMIN — LIDOCAINE 1 APPLICATION(S): 4 CREAM TOPICAL at 01:05

## 2020-01-01 RX ADMIN — LIDOCAINE 1 APPLICATION(S): 4 CREAM TOPICAL at 09:11

## 2020-01-01 RX ADMIN — Medication 1 TABLET(S): at 13:33

## 2020-01-01 RX ADMIN — Medication 1 APPLICATION(S): at 01:05

## 2020-01-01 RX ADMIN — Medication 1 APPLICATION(S): at 20:46

## 2020-01-01 NOTE — PROGRESS NOTE ADULT - PROBLEM SELECTOR PLAN 2
multifactorial  if zofran ineffective, rec trial of reglan as qtc allows  cont ppi and carafate   recent egd w gastritis

## 2020-01-01 NOTE — PROGRESS NOTE ADULT - SUBJECTIVE AND OBJECTIVE BOX
Patient is a 59y old  Female who presents with a chief complaint of pneumothorax, colitis, UTI (01 Jan 2020 12:27)      FROM ADMISSION H+P:   HPI:  59F with PMH of interstitial lung disease hx of pneumothorax (on 2L NC at home), hx pulmonary nodules, Meniere's disease, Non-Hodgkin's lymphoma (SCD/XRT/chemo at [facility] 2003), hx of CVA (3/2019 - residual L sided weakness, unable to use her L arm), not on ASA or Plavix due to GIB, Seizure disorder, tachycardia, MVP, hx of chronic SDH, presents with weakness, weight loss, nausea, and diarrhea for the past month. Patient admits to 4 lb weight loss in 3 weeks, was seen by her PCP and started on Zofran one week ago. Patient reports going to wound care prior to admission for bed sores, and was advised ED evaluation. Per  at bedside, patient has been having loose BMs several times a day with foul smelling urine for 4 days. Denies fever. Patient also has felt too weak to get out of bed and started using a diaper to urinate/have BMs. Has mainly been wheelchair bound recently due to weakness, but at baseline patient is able to stand and walk short distances. Patient uses 2L NC at home and noticed her SpO2 at 75% on RA with ambulation. Patient was also switched to Oxcarbazepine 2-3 months ago and noticed her symptoms of anxiety, weakness, and "memory" have worsened.  Of note, at night when patient takes her Oxcarbazepine, she starts yelling and having "hallucinations."    ----  INTERVAL HPI/OVERNIGHT EVENTS: Pt seen and evaluated at the bedside. No acute overnight events occurred. Pt was almost entirely obtunded this morning when I first assessed her. I discussed w/ nursing and this has been consistent for her after dosing of trileptal. The plan has been to continue w/ current dosing. I reassessed the pt just now and she is markedly more alert and cooperative. She admits feeling tired but overall well. Denies rectal pain now but was complaining of severe rectal pain overnight. No other complaints at this time.     TELEMETRY: SR in 90's without events    ----  PAST MEDICAL & SURGICAL HISTORY:  Interstitial lung disease: on home o2 prn  NHL (non-Hodgkin's lymphoma): Agem 43 sp chemo/rt/stem cell  Transient cerebral ischemia, unspecified type  Mitral prolapse  History of tonsillectomy  History of appendectomy      FAMILY HISTORY:  Family history of stroke  Family history of breast cancer: Mother      Allergies    IV Contrast (Anaphylaxis)  shellfish. (Anaphylaxis)    Intolerances        ----  REVIEW OF SYSTEMS:  CONSTITUTIONAL: denies fever, chills. admits generalized fatigue  HEENT: denies blurred vision, sore throat  SKIN: denies new lesions, rash  CARDIOVASCULAR: denies chest pain, chest pressure, palpitations  RESPIRATORY: denies shortness of breath, sputum production  GASTROINTESTINAL: denies nausea, vomiting, diarrhea, abdominal pain. admits rectal pain  NEUROLOGICAL: denies numbness, headache, focal weakness  HEMATOLOGIC: denies gross bleeding, bruising  PSYCHIATRIC: denies recent changes in anxiety, depression  10 systems reviewed and negative unless otherwise noted     ----  PHYSICAL EXAM:  GENERAL: patient appears sedated, no acute distress  ENMT: oropharynx clear without erythema, moist mucous membranes  LUNGS: good air entry bilaterally, clear to auscultation, symmetric breath sounds, no wheezing or rhonchi appreciated  HEART: soft S1/S2, regular rate and rhythm, no murmurs noted, no noted edema to b/l LE  GASTROINTESTINAL: abdomen is soft, nontender, nondistended, normoactive bowel sounds  INTEGUMENT: good skin turgor, appropriate for ethnicity, appears well perfused, no jaundice noted  MUSCULOSKELETAL: no clubbing or cyanosis, no obvious deformity  NEUROLOGIC: awake, alert, oriented x3, good muscle tone x4 extremities  HEME/LYMPH: no palpable supraclavicular nodules, no obvious ecchymosis     T(C): 36.4 (01-01-20 @ 16:04), Max: 36.7 (12-31-19 @ 23:41)  HR: 80 (01-01-20 @ 16:04) (71 - 87)  BP: 113/76 (01-01-20 @ 16:04) (107/73 - 126/77)  RR: 19 (01-01-20 @ 16:04) (18 - 20)  SpO2: 97% (01-01-20 @ 16:04) (92% - 100%)  Wt(kg): --    ----  I&O's Summary    31 Dec 2019 07:01  -  01 Jan 2020 07:00  --------------------------------------------------------  IN: 340 mL / OUT: 250 mL / NET: 90 mL        LABS:                        11.6   9.14  )-----------( 482      ( 01 Jan 2020 06:12 )             37.0     01-01    139  |  98  |  14  ----------------------------<  121<H>  3.7   |  38<H>  |  0.53    Ca    9.2      01 Jan 2020 06:12          CAPILLARY BLOOD GLUCOSE

## 2020-01-01 NOTE — PROGRESS NOTE ADULT - SUBJECTIVE AND OBJECTIVE BOX
Neurology follow up note    ROMIE [id number]      Interval History:    Patient feels nausea     MEDICATIONS    acetaminophen   Tablet .. 650 milliGRAM(s) Oral every 6 hours PRN  ALBUTerol    0.083% 2.5 milliGRAM(s) Nebulizer every 6 hours PRN  enoxaparin Injectable 40 milliGRAM(s) SubCutaneous daily  hydrocortisone 2.5% Rectal Cream 1 Application(s) Rectal two times a day  hydrocortisone hemorrhoidal Suppository 1 Suppository(s) Rectal at bedtime  influenza   Vaccine 0.5 milliLiter(s) IntraMuscular once  lactobacillus acidophilus 1 Tablet(s) Oral three times a day with meals  lidocaine 2% Gel 1 Application(s) Topical two times a day  ondansetron Injectable 4 milliGRAM(s) IV Push every 8 hours PRN  OXcarbazepine 300 milliGRAM(s) Oral two times a day  pantoprazole    Tablet 40 milliGRAM(s) Oral before breakfast  propranolol 20 milliGRAM(s) Oral three times a day  senna 2 Tablet(s) Oral at bedtime  sucralfate 1 Gram(s) Oral two times a day  tiotropium 18 MICROgram(s) Capsule 1 Capsule(s) Inhalation daily      Allergies    IV Contrast (Anaphylaxis)  shellfish. (Anaphylaxis)    Intolerances            Vital Signs Last 24 Hrs  T(C): 36.4 (01 Jan 2020 07:48), Max: 36.9 (31 Dec 2019 16:34)  T(F): 97.5 (01 Jan 2020 07:48), Max: 98.4 (31 Dec 2019 16:34)  HR: 71 (01 Jan 2020 07:48) (71 - 100)  BP: 107/73 (01 Jan 2020 07:48) (107/73 - 126/77)  BP(mean): --  RR: 20 (01 Jan 2020 07:48) (18 - 20)  SpO2: 100% (01 Jan 2020 07:48) (92% - 100%)    REVIEW OF SYSTEMS:  Constitutional:  The patient denies fever, chills, or night sweats.  Head:  No headaches.  Eyes:  No double vision or blurry vision.  Ears:  No ringing in the ears.  Neck:  No neck pain.  Respiratory:  Occasional shortness of breath.  Cardiovascular:  Positive right-sided chest pain.  Abdomen:  occ nausea,  no vomiting, or abdominal pain.  Extremities/Neurological:  No numbness or tingling.  Musculoskeletal:  Occasional joint pain.    PHYSICAL EXAMINATION:   HEENT:  Head:  Normocephalic, atraumatic.  Eyes:  No scleral icterus.  Ears:  Hearing bilaterally appeared to be intact.  NECK:  Supple.  RESPIRATORY:  Decreased breath sounds bilaterally, but most prominent on the right.  CARDIOVASCULAR:  S1 and S2 heard.  ABDOMEN:  Soft and nontender.  Extremities:  No clubbing or cyanosis were noted.      NEUROLOGIC:  The patient is awake and alert.  Location was [facility], year was 2019, month was December.  Was able to name objects.  Extraocular movements were intact.  Pupils were equal, round, and reactive bilaterally 3 mm to 2 mm.  Speech was fluent.  Smile was symmetric.  Motor:  Right upper was 5/5, left upper was 3/5, right lower was 4/5, left lower was 3+/5.  As per my conversation with the spouse, after her apparent event back in March, questionable seizure versus questionable stroke.  She was left with left-sided weakness.  Sensory:  Bilateral upper and lower appeared intact to light touch.              LABS:  CBC Full  -  ( 01 Jan 2020 06:12 )  WBC Count : 9.14 K/uL  RBC Count : 4.02 M/uL  Hemoglobin : 11.6 g/dL  Hematocrit : 37.0 %  Platelet Count - Automated : 482 K/uL  Mean Cell Volume : 92.0 fl  Mean Cell Hemoglobin : 28.9 pg  Mean Cell Hemoglobin Concentration : 31.4 gm/dL  Auto Neutrophil # : x  Auto Lymphocyte # : x  Auto Monocyte # : x  Auto Eosinophil # : x  Auto Basophil # : x  Auto Neutrophil % : x  Auto Lymphocyte % : x  Auto Monocyte % : x  Auto Eosinophil % : x  Auto Basophil % : x      01-01    139  |  98  |  14  ----------------------------<  121<H>  3.7   |  38<H>  |  0.53    Ca    9.2      01 Jan 2020 06:12      Hemoglobin A1C:       Vitamin B12         RADIOLOGY          ANALYSIS AND PLAN:  This is a 59-year-old with a history of possibly epilepsy verse TIAs, history of chronic subdural hydromas and change in mental status.  1.	For episode of change in mental status, as per my conversation with the spouse, these appear to occur primarily at the same time at night for the last three to four weeks.  The patient has been on Trileptal for about eight to nine months.  Suspect less likely this is Trileptal, but I will check Trileptal levels.  Questionable, the patient could have any type of sleep-related disorder causing these events to occur at night.  I spoke with the spouse, the patient should undergo sleep studies.  2.	For history of chronic subdural hematoma, these appeared to have resolved from previous MRI.  3.	For history of possible underlying epilepsy, for now, I will continue the patient on her Trileptal.  4.	For episode of left-sided hemipareses of unclear etiology, as per my conversation with the spouse, there was a question that this was from a seizure event versus possibly a TIA even though MRI did not show cerebrovascular accident.  Unclear, the patient has any type of underlying neurodegenerative disease that could be explaining this.  The patient was seen at [facility] and does follow up with an outside neurologist.  5.	Monitor CO2 levels as needed  6.	spoke outside neurologist Dr. Wallace.  yesterday agrees to continue trileptal for now  His telephone number is [phone].  7.	Spouse's name is Marialuisa, his telephone number is [phone] 12/31/19  8.	appears more interactive   9.	Greater than 35 minutes of time was spent with the patient, plan of care, reviewing data, speaking to the family and multidisciplinary healthcare team    Thank you for the courtesy of this consultation.    Physical therapy evaluation if possible and as tolerated.  OOB to chair/ambulation with assistance only if possible.

## 2020-01-01 NOTE — PROGRESS NOTE ADULT - PROBLEM SELECTOR PLAN 3
CT abd/pelvis: Large amount of stool within the rectum with surrounding inflammatory change. Stercoral colitis.  -s/p 3 day course of IV flagyl   -continue Senna at bedtime  -Bacid TID  - continue PPI  - continue carafate  -GI, Dr. Hutchinson following  -possible abd XR tomorrow if syptoms persist (to eval bowel gas pattern)

## 2020-01-01 NOTE — PROGRESS NOTE ADULT - ATTENDING COMMENTS
The tx plan was discussed with the patient in detail. The patient's questions and concerns were addressed to the best of my ability. The patient is in agreement with the plan detailed above. The patient demonstrated adequate understanding of the plan and the counseling which I have provided.     d/c planning if PTX remains stable  pt heavily sedated from trileptal during the day

## 2020-01-01 NOTE — PROGRESS NOTE ADULT - SUBJECTIVE AND OBJECTIVE BOX
Date/Time Patient Seen:  		  Referring MD:   Data Reviewed	       Patient is a 59y old  Female who presents with a chief complaint of pneumothorax, colitis, UTI (31 Dec 2019 12:04)      Subjective/HPI     PAST MEDICAL & SURGICAL HISTORY:  Interstitial lung disease: on home o2 prn  NHL (non-Hodgkin's lymphoma): Agem 43 sp chemo/rt/stem cell  Transient cerebral ischemia, unspecified type  Mitral prolapse  Pulmonary disease  History of tonsillectomy  History of appendectomy        Medication list         MEDICATIONS  (STANDING):  enoxaparin Injectable 40 milliGRAM(s) SubCutaneous daily  hydrocortisone 2.5% Rectal Cream 1 Application(s) Rectal two times a day  influenza   Vaccine 0.5 milliLiter(s) IntraMuscular once  lactobacillus acidophilus 1 Tablet(s) Oral three times a day with meals  lidocaine 2% Gel 1 Application(s) Topical two times a day  OXcarbazepine 300 milliGRAM(s) Oral two times a day  pantoprazole    Tablet 40 milliGRAM(s) Oral before breakfast  propranolol 20 milliGRAM(s) Oral three times a day  senna 2 Tablet(s) Oral at bedtime  sucralfate 1 Gram(s) Oral two times a day  tiotropium 18 MICROgram(s) Capsule 1 Capsule(s) Inhalation daily    MEDICATIONS  (PRN):  acetaminophen   Tablet .. 650 milliGRAM(s) Oral every 6 hours PRN Temp greater or equal to 38C (100.4F), Moderate Pain (4 - 6)  ALBUTerol    0.083% 2.5 milliGRAM(s) Nebulizer every 6 hours PRN Shortness of Breath and/or Wheezing  ondansetron Injectable 4 milliGRAM(s) IV Push every 8 hours PRN Nausea and/or Vomiting         Vitals log        ICU Vital Signs Last 24 Hrs  T(C): 36.3 (01 Jan 2020 05:10), Max: 36.9 (31 Dec 2019 16:34)  T(F): 97.4 (01 Jan 2020 05:10), Max: 98.4 (31 Dec 2019 16:34)  HR: 87 (01 Jan 2020 05:10) (74 - 100)  BP: 117/77 (01 Jan 2020 05:10) (102/68 - 126/77)  BP(mean): --  ABP: --  ABP(mean): --  RR: 18 (01 Jan 2020 05:10) (18 - 20)  SpO2: 98% (01 Jan 2020 05:10) (90% - 99%)           Input and Output:  I&O's Detail    31 Dec 2019 07:01  -  01 Jan 2020 07:00  --------------------------------------------------------  IN:    Oral Fluid: 340 mL  Total IN: 340 mL    OUT:    Voided: 250 mL  Total OUT: 250 mL    Total NET: 90 mL          Lab Data                        11.6   9.14  )-----------( 482      ( 01 Jan 2020 06:12 )             37.0     01-01    139  |  98  |  14  ----------------------------<  121<H>  3.7   |  38<H>  |  0.53    Ca    9.2      01 Jan 2020 06:12              Review of Systems	      Objective     Physical Examination    head at  heart s1s2  lung dec BS  abd soft      Pertinent Lab findings & Imaging      Shauna:  NO   Adequate UO     I&O's Detail    31 Dec 2019 07:01  -  01 Jan 2020 07:00  --------------------------------------------------------  IN:    Oral Fluid: 340 mL  Total IN: 340 mL    OUT:    Voided: 250 mL  Total OUT: 250 mL    Total NET: 90 mL               Discussed with:     Cultures:	        Radiology

## 2020-01-01 NOTE — PROGRESS NOTE ADULT - PROBLEM SELECTOR PLAN 2
UA: moderate LEC, WBC 26-50, moderate bacteria. Patient reports foul smell, currently using diapers due to feeling weak  s/p rocephin

## 2020-01-01 NOTE — CHART NOTE - NSCHARTNOTEFT_GEN_A_CORE
Called by RN for "intense rectal pain". Patient seen and evaluated at bedside. Complaining of severe rectal pain that started tonight. Patient believes it may be hemorrhoids because she has been having diarrhea and has been straining a lot. Described the pain as sharp and dull, intermittent, rated 15/10 at its worst. Pain does not radiate. Has no other acute complaints.    Vital Signs Last 24 Hrs  T(C): 36.7 (31 Dec 2019 23:41), Max: 36.9 (31 Dec 2019 16:34)  T(F): 98.1 (31 Dec 2019 23:41), Max: 98.4 (31 Dec 2019 16:34)  HR: 87 (31 Dec 2019 23:41) (74 - 100)  BP: 126/77 (31 Dec 2019 23:41) (102/68 - 126/77)  BP(mean): --  RR: 18 (31 Dec 2019 23:41) (17 - 20)  SpO2: 98% (31 Dec 2019 23:41) (90% - 99%)    Physical Exam:  General: Well developed, well nourished, NAD  Neurology: A&Ox3, nonfocal  Rectal: Two external hemorrhoids able to visualized, tender to palpation    A/P: Severe rectal pain likely from external hemorrhoids  -ordered 2% lidocaine and 2% hydrocortisone cream twice a day  -will continue to monitor, RN to call if any changes Called by RN for "intense rectal pain". Patient seen and evaluated at bedside. Complaining of severe rectal pain that started right before the new year Patient believes it may be hemorrhoids because she has been having diarrhea and has been straining a lot. Described the pain as sharp and dull, intermittent, rated 15/10 at its worst. Pain does not radiate. Has no other acute complaints.    Vital Signs Last 24 Hrs  T(C): 36.7 (31 Dec 2019 23:41), Max: 36.9 (31 Dec 2019 16:34)  T(F): 98.1 (31 Dec 2019 23:41), Max: 98.4 (31 Dec 2019 16:34)  HR: 87 (31 Dec 2019 23:41) (74 - 100)  BP: 126/77 (31 Dec 2019 23:41) (102/68 - 126/77)  BP(mean): --  RR: 18 (31 Dec 2019 23:41) (17 - 20)  SpO2: 98% (31 Dec 2019 23:41) (90% - 99%)    Physical Exam:  General: Well developed, well nourished, NAD  Neurology: A&Ox3, nonfocal  Rectal: Two external hemorrhoids able to visualized, tender to palpation    A/P: Severe rectal pain likely from external hemorrhoids  -ordered 2% lidocaine and 2% hydrocortisone cream twice a day  -will continue to monitor, RN to call if any changes

## 2020-01-01 NOTE — PROGRESS NOTE ADULT - PROBLEM SELECTOR PLAN 1
ct showing stercoral colitis c/b rectal pain in setting of hemorrhoids  cont senna as tolerated  cont lido and hydrocortisone cream   will add aunsol suppository qhs  cont bacid  diet as tolerated  monitor exam/gi fxn  alternating c/o diarrhea/constipation, if persists can consider repeat axr to eval stool burden  up to date w colonoscopy

## 2020-01-01 NOTE — PROGRESS NOTE ADULT - PROBLEM SELECTOR PLAN 1
moderate right pneumothorax, unchanged  continue to encourage incentive spirometry use  saturating 93-99% on 2L nasal canula (pt. occasionally uses this at home) will attempt to wean to room air as tolerated  out of bed as tolerated  Cardiothoracic surgery, Dr. France consulted - continue daily CXR to monitor progress of PTX  Pulmonolgy, Dr. Rashid consulted - continue Spiriva and duonebs

## 2020-01-01 NOTE — PROGRESS NOTE ADULT - PROBLEM SELECTOR PLAN 1
cxr shows improvement - serial CXR noted and reviewed -   vs and HD and Sat reviewed  on tele monitor  overnight events noted  check sat on room air   chronic lung disease - ILD - PTX - (ptx unchanged - stable HD and Resp Status)  thoracic eval noted - will need follow up - as outpatient -   I jesus  on spiriva and Albuterol PRN nebs -   vs and HD and Sat reviewed  supportive medical regimen - will follow and monitor -.   discussed plan of care  medical supportive management and care and assist with ADL  out of bed as tolerated  pulm - follows with Dr. Bettye Hazel in [facility].

## 2020-01-02 LAB
ANION GAP SERPL CALC-SCNC: 6 MMOL/L — SIGNIFICANT CHANGE UP (ref 5–17)
BUN SERPL-MCNC: 17 MG/DL — SIGNIFICANT CHANGE UP (ref 7–23)
CALCIUM SERPL-MCNC: 9.1 MG/DL — SIGNIFICANT CHANGE UP (ref 8.5–10.1)
CHLORIDE SERPL-SCNC: 101 MMOL/L — SIGNIFICANT CHANGE UP (ref 96–108)
CO2 SERPL-SCNC: 32 MMOL/L — HIGH (ref 22–31)
CREAT SERPL-MCNC: 0.49 MG/DL — LOW (ref 0.5–1.3)
GLUCOSE SERPL-MCNC: 91 MG/DL — SIGNIFICANT CHANGE UP (ref 70–99)
HCT VFR BLD CALC: 40 % — SIGNIFICANT CHANGE UP (ref 34.5–45)
HGB BLD-MCNC: 12.4 G/DL — SIGNIFICANT CHANGE UP (ref 11.5–15.5)
MAGNESIUM SERPL-MCNC: 2.2 MG/DL — SIGNIFICANT CHANGE UP (ref 1.6–2.6)
MCHC RBC-ENTMCNC: 28.8 PG — SIGNIFICANT CHANGE UP (ref 27–34)
MCHC RBC-ENTMCNC: 31 GM/DL — LOW (ref 32–36)
MCV RBC AUTO: 92.8 FL — SIGNIFICANT CHANGE UP (ref 80–100)
NRBC # BLD: 0 /100 WBCS — SIGNIFICANT CHANGE UP (ref 0–0)
PLATELET # BLD AUTO: 321 K/UL — SIGNIFICANT CHANGE UP (ref 150–400)
POTASSIUM SERPL-MCNC: 5 MMOL/L — SIGNIFICANT CHANGE UP (ref 3.5–5.3)
POTASSIUM SERPL-SCNC: 5 MMOL/L — SIGNIFICANT CHANGE UP (ref 3.5–5.3)
RBC # BLD: 4.31 M/UL — SIGNIFICANT CHANGE UP (ref 3.8–5.2)
RBC # FLD: 14.3 % — SIGNIFICANT CHANGE UP (ref 10.3–14.5)
SODIUM SERPL-SCNC: 139 MMOL/L — SIGNIFICANT CHANGE UP (ref 135–145)
WBC # BLD: 6.81 K/UL — SIGNIFICANT CHANGE UP (ref 3.8–10.5)
WBC # FLD AUTO: 6.81 K/UL — SIGNIFICANT CHANGE UP (ref 3.8–10.5)

## 2020-01-02 PROCEDURE — 99233 SBSQ HOSP IP/OBS HIGH 50: CPT | Mod: GC

## 2020-01-02 PROCEDURE — 74019 RADEX ABDOMEN 2 VIEWS: CPT | Mod: 26

## 2020-01-02 RX ORDER — PSYLLIUM SEED (WITH DEXTROSE)
1 POWDER (GRAM) ORAL DAILY
Refills: 0 | Status: DISCONTINUED | OUTPATIENT
Start: 2020-01-02 | End: 2020-01-05

## 2020-01-02 RX ORDER — SIMETHICONE 80 MG/1
80 TABLET, CHEWABLE ORAL
Refills: 0 | Status: DISCONTINUED | OUTPATIENT
Start: 2020-01-02 | End: 2020-01-09

## 2020-01-02 RX ORDER — MULTIVIT-MIN/FERROUS GLUCONATE 9 MG/15 ML
1 LIQUID (ML) ORAL DAILY
Refills: 0 | Status: CANCELLED | OUTPATIENT
Start: 2020-01-02 | End: 2020-01-24

## 2020-01-02 RX ORDER — AER TRAVELER 0.5 G/1
1 SOLUTION RECTAL; TOPICAL THREE TIMES A DAY
Refills: 0 | Status: DISCONTINUED | OUTPATIENT
Start: 2020-01-02 | End: 2020-01-24

## 2020-01-02 RX ORDER — ACETAMINOPHEN 500 MG
650 TABLET ORAL EVERY 6 HOURS
Refills: 0 | Status: DISCONTINUED | OUTPATIENT
Start: 2020-01-02 | End: 2020-01-24

## 2020-01-02 RX ORDER — ASCORBIC ACID 60 MG
500 TABLET,CHEWABLE ORAL
Refills: 0 | Status: CANCELLED | OUTPATIENT
Start: 2020-01-02 | End: 2020-01-24

## 2020-01-02 RX ORDER — METOCLOPRAMIDE HCL 10 MG
5 TABLET ORAL EVERY 8 HOURS
Refills: 0 | Status: DISCONTINUED | OUTPATIENT
Start: 2020-01-02 | End: 2020-01-07

## 2020-01-02 RX ORDER — SODIUM CHLORIDE 9 MG/ML
500 INJECTION INTRAMUSCULAR; INTRAVENOUS; SUBCUTANEOUS ONCE
Refills: 0 | Status: COMPLETED | OUTPATIENT
Start: 2020-01-02 | End: 2020-01-02

## 2020-01-02 RX ORDER — HYDROCORTISONE 1 %
1 OINTMENT (GRAM) TOPICAL
Refills: 0 | Status: DISCONTINUED | OUTPATIENT
Start: 2020-01-02 | End: 2020-01-24

## 2020-01-02 RX ORDER — KETOROLAC TROMETHAMINE 30 MG/ML
30 SYRINGE (ML) INJECTION EVERY 6 HOURS
Refills: 0 | Status: DISCONTINUED | OUTPATIENT
Start: 2020-01-02 | End: 2020-01-02

## 2020-01-02 RX ORDER — KETOROLAC TROMETHAMINE 30 MG/ML
15 SYRINGE (ML) INJECTION EVERY 4 HOURS
Refills: 0 | Status: DISCONTINUED | OUTPATIENT
Start: 2020-01-02 | End: 2020-01-04

## 2020-01-02 RX ORDER — LIDOCAINE 4 G/100G
1 CREAM TOPICAL THREE TIMES A DAY
Refills: 0 | Status: DISCONTINUED | OUTPATIENT
Start: 2020-01-02 | End: 2020-01-24

## 2020-01-02 RX ADMIN — LIDOCAINE 1 APPLICATION(S): 4 CREAM TOPICAL at 13:01

## 2020-01-02 RX ADMIN — Medication 650 MILLIGRAM(S): at 09:05

## 2020-01-02 RX ADMIN — SENNA PLUS 2 TABLET(S): 8.6 TABLET ORAL at 21:33

## 2020-01-02 RX ADMIN — Medication 650 MILLIGRAM(S): at 09:35

## 2020-01-02 RX ADMIN — LIDOCAINE 1 APPLICATION(S): 4 CREAM TOPICAL at 09:05

## 2020-01-02 RX ADMIN — Medication 30 MILLIGRAM(S): at 19:10

## 2020-01-02 RX ADMIN — LIDOCAINE 1 APPLICATION(S): 4 CREAM TOPICAL at 21:32

## 2020-01-02 RX ADMIN — SODIUM CHLORIDE 500 MILLILITER(S): 9 INJECTION INTRAMUSCULAR; INTRAVENOUS; SUBCUTANEOUS at 17:30

## 2020-01-02 RX ADMIN — OXCARBAZEPINE 300 MILLIGRAM(S): 300 TABLET, FILM COATED ORAL at 09:05

## 2020-01-02 RX ADMIN — ONDANSETRON 4 MILLIGRAM(S): 8 TABLET, FILM COATED ORAL at 09:12

## 2020-01-02 RX ADMIN — Medication 1 PACKET(S): at 17:47

## 2020-01-02 RX ADMIN — AER TRAVELER 1 APPLICATION(S): 0.5 SOLUTION RECTAL; TOPICAL at 13:01

## 2020-01-02 RX ADMIN — Medication 1 TABLET(S): at 17:47

## 2020-01-02 RX ADMIN — Medication 1 GRAM(S): at 05:38

## 2020-01-02 RX ADMIN — TIOTROPIUM BROMIDE 1 CAPSULE(S): 18 CAPSULE ORAL; RESPIRATORY (INHALATION) at 13:01

## 2020-01-02 RX ADMIN — AER TRAVELER 1 APPLICATION(S): 0.5 SOLUTION RECTAL; TOPICAL at 21:34

## 2020-01-02 RX ADMIN — Medication 30 MILLIGRAM(S): at 19:40

## 2020-01-02 RX ADMIN — ONDANSETRON 4 MILLIGRAM(S): 8 TABLET, FILM COATED ORAL at 17:47

## 2020-01-02 RX ADMIN — Medication 1 APPLICATION(S): at 21:32

## 2020-01-02 RX ADMIN — PANTOPRAZOLE SODIUM 40 MILLIGRAM(S): 20 TABLET, DELAYED RELEASE ORAL at 05:38

## 2020-01-02 RX ADMIN — Medication 1 APPLICATION(S): at 09:12

## 2020-01-02 RX ADMIN — Medication 1 GRAM(S): at 17:47

## 2020-01-02 RX ADMIN — Medication 1 SUPPOSITORY(S): at 17:47

## 2020-01-02 RX ADMIN — SIMETHICONE 80 MILLIGRAM(S): 80 TABLET, CHEWABLE ORAL at 19:10

## 2020-01-02 RX ADMIN — OXCARBAZEPINE 300 MILLIGRAM(S): 300 TABLET, FILM COATED ORAL at 21:33

## 2020-01-02 RX ADMIN — ENOXAPARIN SODIUM 40 MILLIGRAM(S): 100 INJECTION SUBCUTANEOUS at 13:01

## 2020-01-02 NOTE — PROGRESS NOTE ADULT - PROBLEM SELECTOR PLAN 2
UA: moderate LEC, WBC 26-50, moderate bacteria. Patient reports foul smell, currently using diapers due to feeling weak  -s/p tx with ceftriaxone

## 2020-01-02 NOTE — PROGRESS NOTE ADULT - ASSESSMENT
59F with PMH of interstitial lung disease hx of pneumothorax (on 2L NC at home), hx pulmonary nodules, Meniere's disease, Non-Hodgkin's lymphoma (SCD/XRT/chemo at [facility] 2003), hx of CVA (3/2019 - residual L sided weakness, unable to use her L arm), not on ASA or Plavix due to GIB, Seizure disorder, tachycardia, MVP, hx of chronic SDH, presents with weakness, weight loss, nausea, and diarrhea admitted for R pneumothorax now stable on Chest xray

## 2020-01-02 NOTE — PROVIDER CONTACT NOTE (OTHER) - SITUATION
Patient did not receive two doses of propanol 2/2 to sbp less than parameters. Patient to go down for abd xray of monitor as per provider. HR ranging from 100s-120s sinus tach.

## 2020-01-02 NOTE — PROGRESS NOTE ADULT - PROBLEM SELECTOR PLAN 1
serial cxr noted and reviewed - no worsening - if anything improvement is noted - clinically pt is fine - without resp distress and or HD instability  will dc AM CXR - no need for any more Imaging related to PTX  supportive care  o2 support  pt has o2 at home  pt has chronic lung disease - ILD  will follow up with Dr. Bettye Hazel in [facility] - for her Pulmonary Needs -   I jesus if able to cooperate  out of bed as tolerated  medical supportive care and regimen  discussed with patient

## 2020-01-02 NOTE — PROGRESS NOTE ADULT - PROBLEM SELECTOR PLAN 1
moderate right pneumothorax,   -following serial xray now unchanged  -continue to encourage incentive spirometry use  -patient has h/o ILD (pt. occasionally uses this at home) will attempt to wean to room air as tolerated.  saturating 93-99% on 2L nasal canula  -out of bed as tolerated  -Cardiothoracic surgery, Dr. France consulted -daily CXR to monitor progress of PTX were done, now stabilized  -Pulmonolgy, Dr. Rashid consulted - continue Spiriva and duonebs

## 2020-01-02 NOTE — CHART NOTE - NSCHARTNOTEFT_GEN_A_CORE
Called by RN for severe abdominal pain. Patient seen and evaluated at bedside. Patient says the abdominal pain started about an hour ago. Received Zofran with no relief. Describes the pain as sharp, intermittent and located in the periumbilical region. Radiates around the abdomen. Currently rated as 2/10 but at its worst it is 10/10. Denies constipation.    Vital Signs Last 24 Hrs  T(C): 36.4 (02 Jan 2020 15:15), Max: 36.4 (01 Jan 2020 20:49)  T(F): 97.5 (02 Jan 2020 15:15), Max: 97.6 (02 Jan 2020 07:20)  HR: 122 (02 Jan 2020 17:45) (70 - 126)  BP: 107/75 (02 Jan 2020 15:15) (98/66 - 116/78)  BP(mean): --  RR: 18 (02 Jan 2020 15:15) (18 - 19)  SpO2: 96% (02 Jan 2020 15:15) (92% - 97%)    Physical Exam:  General: Well developed, well nourished, NAD  HEENT: NCAT, PERRLA, EOMI bl, moist mucous membranes   Neurology: A&Ox3, nonfocal  Abdominal: Soft, mild tenderness to deep palpation in the periumbilical region, no rebound, no guarding, ND +BSx4  Skin: warm, dry, normal color    A/P: 59F with PMH of interstitial lung disease hx of pneumothorax (on 2L NC at home), hx pulmonary nodules, Meniere's disease, Non-Hodgkin's lymphoma ([place] [facility] [place] hx of CVA (3/2019 - residual L sided weakness, unable to use her L arm), not on ASA or Plavix due to GIB, Seizure disorder, tachycardia, MVP, hx of chronic SDH presents with weakness, weight loss, nausea, and diarrhea admitted for R pneumothorax now stable on Chest xray. Abdominal pain likely 2/2 to known colitis, pain suboptimally controlled, abdominal exam relatively benign, soft and no rebound no guarding, minimal concern for perforation  -will add pain regimen Tylenol, Toradol 15 for mild/moderate as patient's kidney function is fine  -has Simethicone BID PRN for upset stomach, can give if pain continues  -continue to monitor, if pain continues to remain uncontrolled, will reassess patient  -RN to call if any changes

## 2020-01-02 NOTE — PROGRESS NOTE ADULT - PROBLEM SELECTOR PLAN 1
ct showing stercoral colitis c/b rectal pain in setting of hemorrhoids  bowel regimen as tolerated  cont lido and hydrocortisone cream   cont anusol supp bid, add witch hazel pads, sitz baths prn  diet as tolerated  prn pain control  monitor exam/gi fxn  up to date w colonoscopy  will follow

## 2020-01-02 NOTE — PROGRESS NOTE ADULT - ATTENDING COMMENTS
59F, hx interstitial lung dz/multiple pneumothoraces/2L home O2, hx pulm nodules, hx hepatic lesions, Meneire's dz, NHL/chemotherapy, hx CVA 3/2019/residual R weakness, hx seizures, hx GIB, hx possible IBS - mgmt for R pnthx assoc w/lung trapping - managed conservatively w/suppl O2, close monitoring w/serial CXRs as per Pulm recomms; also stercolitis on IV abxs, +UTI - being optimized  -pneumothorax - CXR showed decreasing size - repeat CXR 1/1/20 unchanged; continued suppl O2 - pt on 2L home oxygen at baseline  -UTI - continue ceftriaxone  -hx stercolitis - s/p short course flagyl - as pt w/o goldy colitis sxs, and decreased c/f infectious process - possible IBS/inflammatory process - on anusol suppositories  -seizures - continue trileptal  -hx hepatic lesions - noted on CTAP - also present on previous CTAP 4/2019 - clarify w/pt re prior f/u and management  -dvt prophy  -PT mobilization as resp status more stabilized

## 2020-01-02 NOTE — PROGRESS NOTE ADULT - SUBJECTIVE AND OBJECTIVE BOX
INTERVAL HPI/OVERNIGHT EVENTS:  pt seen and examined  denies n/v/abd pain  less 'diarrhea'  c/o rectal pain  per rn small smears of stool overnight    MEDICATIONS  (STANDING):  enoxaparin Injectable 40 milliGRAM(s) SubCutaneous daily  hydrocortisone 2.5% Rectal Cream 1 Application(s) Rectal two times a day  hydrocortisone hemorrhoidal Suppository 1 Suppository(s) Rectal at bedtime  influenza   Vaccine 0.5 milliLiter(s) IntraMuscular once  lactobacillus acidophilus 1 Tablet(s) Oral three times a day with meals  lidocaine 2% Gel 1 Application(s) Topical two times a day  OXcarbazepine 300 milliGRAM(s) Oral two times a day  pantoprazole    Tablet 40 milliGRAM(s) Oral before breakfast  propranolol 20 milliGRAM(s) Oral three times a day  senna 2 Tablet(s) Oral at bedtime  sucralfate 1 Gram(s) Oral two times a day  tiotropium 18 MICROgram(s) Capsule 1 Capsule(s) Inhalation daily    MEDICATIONS  (PRN):  acetaminophen   Tablet .. 650 milliGRAM(s) Oral every 6 hours PRN Temp greater or equal to 38C (100.4F), Moderate Pain (4 - 6)  ALBUTerol    0.083% 2.5 milliGRAM(s) Nebulizer every 6 hours PRN Shortness of Breath and/or Wheezing  ondansetron Injectable 4 milliGRAM(s) IV Push every 8 hours PRN Nausea and/or Vomiting      Allergies    IV Contrast (Anaphylaxis)  shellfish. (Anaphylaxis)    Intolerances        Review of Systems:    General:  No wt loss, fevers, chills, night sweats, fatigue   Eyes:  Good vision, no reported pain  ENT:  No sore throat, pain, runny nose, dysphagia  CV:  No pain, palpitations, hypo/hypertension  Resp:  No dyspnea, cough, tachypnea, wheezing  GI:  +rectal pain, No nausea, No vomiting, No diarrhea, No constipation, No weight loss, No fever, No pruritis, No rectal bleeding, No melena, No dysphagia  :  No pain, bleeding, incontinence, nocturia  Muscle:  No pain, weakness  Neuro:  No weakness, tingling, memory problems  Psych:  No fatigue, insomnia, mood problems, depression  Endocrine:  No polyuria, polydypsia, cold/heat intolerance  Heme:  No petechiae, ecchymosis, easy bruisability  Skin:  No rash, tattoos, scars, edema      Vital Signs Last 24 Hrs  T(C): 36.4 (02 Jan 2020 07:20), Max: 36.4 (01 Jan 2020 16:04)  T(F): 97.6 (02 Jan 2020 07:20), Max: 97.6 (01 Jan 2020 16:04)  HR: 79 (02 Jan 2020 07:20) (70 - 85)  BP: 116/78 (02 Jan 2020 07:20) (98/66 - 116/78)  BP(mean): --  RR: 19 (02 Jan 2020 07:20) (18 - 19)  SpO2: 95% (02 Jan 2020 07:20) (95% - 98%)    PHYSICAL EXAM:    Constitutional: lying in bed  HEENT: ncat  Neck: No LAD  Gastrointestinal: soft nt nd  Extremities: No peripheral edema  Neurological: Awake alert responds appropriately        LABS:                        12.4   6.81  )-----------( 321      ( 02 Jan 2020 06:35 )             40.0     01-02    139  |  101  |  17  ----------------------------<  91  5.0   |  32<H>  |  0.49<L>    Ca    9.1      02 Jan 2020 06:35  Mg     2.2     01-02            RADIOLOGY & ADDITIONAL TESTS:

## 2020-01-02 NOTE — PROGRESS NOTE ADULT - SUBJECTIVE AND OBJECTIVE BOX
Patient is a 59y old  Female who presents with a chief complaint of pneumothorax, colitis, UTI (02 Jan 2020 10:11)      FROM ADMISSION H+P:   HPI:  59F with PMH of interstitial lung disease hx of pneumothorax (on 2L NC at home), hx pulmonary nodules, Meniere's disease, Non-Hodgkin's lymphoma (SCD/XRT/chemo at [facility] 2003), hx of CVA (3/2019 - residual L sided weakness, unable to use her L arm), not on ASA or Plavix due to GIB, Seizure disorder, tachycardia, MVP, hx of chronic SDH, presents with weakness, weight loss, nausea, and diarrhea for the past month. Patient admits to 4 lb weight loss in 3 weeks, was seen by her PCP and started on Zofran one week ago. Patient reports going to wound care prior to admission for bed sores, and was advised ED evaluation. Per  at bedside, patient has been having loose BMs several times a day with foul smelling urine for 4 days. Denies fever. Patient also has felt too weak to get out of bed and started using a diaper to urinate/have BMs. Has mainly been wheelchair bound recently due to weakness, but at baseline patient is able to stand and walk short distances. Patient uses 2L NC at home and noticed her SpO2 at 75% on RA with ambulation. Patient was also switched to Oxcarbazepine 2-3 months ago and noticed her symptoms of anxiety, weakness, and "memory" have worsened.  Of note, at night when patient takes her Oxcarbazepine, she starts yelling and having "hallucinations."     In the ED: temp 97.6, HR 97, /80, RR 15, SpO2 92% RA, 98% on 3L NC. WBC 11.10. UA: moderate LEC, WBC 26-50, moderate bacteria. Chest x-ray: Interval enlargement of a right-sided pneumothorax. Small bilateral pleural effusions. Chronic lung fibrotic changes. CT chest/abd/pelvis: Moderate-sized right pneumothorax. Worsening groundglass opacities in both lower lobes, possibly infection. Stercoral colitis. Indeterminant hepatic lesions, possibly metastatic disease. Received IV Rocephin, IV Flagyl, 1L NS Bolus. EKG: NSR 88    Of note,  wants us to check oxcarbazepine levels. (26 Dec 2019 21:14)      ----  INTERVAL HPI/OVERNIGHT EVENTS: Pt seen and evaluated at the bedside. No acute overnight events occurred. Patient complaining of nausea which she states occurs every morning including when she is at home.  Patient states the Zofran IV works better than the oral dose, states the Reglan does not help.  Denies any chest pain, shortness of breath, abdominal pain.  Admits to the left sided weakness but denies using any assistance with ambulation.      ----  PAST MEDICAL & SURGICAL HISTORY:  Interstitial lung disease: on home o2 prn  NHL (non-Hodgkin's lymphoma): Agem 43 sp chemo/rt/stem cell  Transient cerebral ischemia, unspecified type  Mitral prolapse  History of tonsillectomy  History of appendectomy      FAMILY HISTORY:  Family history of stroke  Family history of breast cancer: Mother      Allergies    IV Contrast (Anaphylaxis)  shellfish. (Anaphylaxis)    Intolerances        ----  REVIEW OF SYSTEMS:  CONSTITUTIONAL: denies fever, chills, fatigue, weakness  HEENT: denies blurred vision, sore throat  SKIN: denies new lesions, rash  CARDIOVASCULAR: denies chest pain, chest pressure, palpitations  RESPIRATORY: denies shortness of breath, sputum production  GASTROINTESTINAL: denies nausea, vomiting, diarrhea, abdominal pain  GENITOURINARY: denies dysuria, discharge  NEUROLOGICAL: denies numbness, headache, focal weakness  MUSCULOSKELETAL: denies new joint pain, muscle aches  HEMATOLOGIC: denies gross bleeding, bruising    ----  PHYSICAL EXAM:  GENERAL: patient appears well, no acute distress, appropriately interactive  EYES: sclera clear, no exudates  ENMT: oropharynx clear without erythema, moist mucous membranes  NECK: supple, soft, no thyromegaly noted  LUNGS: slightly decreased breath sounds on R side vs L, no wheezing, no rhonchi  HEART: soft S1/S2, regular rate and rhythm, no murmurs noted, no noted edema to b/l LE  GASTROINTESTINAL: abdomen is soft, nontender, nondistended, normoactive bowel sounds, no palpable masses  INTEGUMENT: good skin turgor, appropriate for ethnicity, appears well perfused, no jaundice noted  MUSCULOSKELETAL: no clubbing or cyanosis, no obvious deformity, L sided weakness: LUE weakness >>LLE.   NEUROLOGIC: awake, alert, oriented x3, good muscle tone in 4 extremities, weakness of L side, no obvious sensory deficits  PSYCHIATRIC: mood is good, affect is congruent with mood, linear and logical thought process  HEME/LYMPH: no palpable supraclavicular nodules, no obvious ecchymosis     T(C): 36.4 (01-02-20 @ 11:38), Max: 36.4 (01-01-20 @ 16:04)  HR: 92 (01-02-20 @ 11:38) (70 - 92)  BP: 108/74 (01-02-20 @ 11:38) (98/66 - 116/78)  RR: 18 (01-02-20 @ 11:38) (18 - 19)  SpO2: 92% (01-02-20 @ 11:55) (92% - 97%)  Wt(kg): --    ----  I&O's Summary    01 Jan 2020 07:01  -  02 Jan 2020 07:00  --------------------------------------------------------  IN: 200 mL / OUT: 250 mL / NET: -50 mL        LABS:                        12.4   6.81  )-----------( 321      ( 02 Jan 2020 06:35 )             40.0     01-02    139  |  101  |  17  ----------------------------<  91  5.0   |  32<H>  |  0.49<L>    Ca    9.1      02 Jan 2020 06:35  Mg     2.2     01-02          CAPILLARY BLOOD GLUCOSE                    ----  Personally reviewed:  Vital sign trends: [ x ] yes    [  ] no     [  ] n/a  Laboratory results: [ x ] yes    [  ] no     [  ] n/a  Radiology results: [ x ] yes    [  ] no     [  ] n/a  Culture results: [  ] yes    [  ] no     [ x ] n/a  Consultant recommendations: [ x ] yes    [  ] no     [  ] n/a

## 2020-01-02 NOTE — PROGRESS NOTE ADULT - PROBLEM SELECTOR PLAN 2
multifactorial; improved  if zofran ineffective, rec trial of reglan as qtc allows  cont ppi and carafate   recent egd w gastritis

## 2020-01-02 NOTE — PROGRESS NOTE ADULT - SUBJECTIVE AND OBJECTIVE BOX
Date/Time Patient Seen:  		  Referring MD:   Data Reviewed	       Patient is a 59y old  Female who presents with a chief complaint of pneumothorax, colitis, UTI (01 Jan 2020 18:59)      Subjective/HPI     PAST MEDICAL & SURGICAL HISTORY:  Interstitial lung disease: on home o2 prn  NHL (non-Hodgkin's lymphoma): Agem 43 sp chemo/rt/stem cell  Transient cerebral ischemia, unspecified type  Mitral prolapse  Pulmonary disease  History of tonsillectomy  History of appendectomy        Medication list         MEDICATIONS  (STANDING):  enoxaparin Injectable 40 milliGRAM(s) SubCutaneous daily  hydrocortisone 2.5% Rectal Cream 1 Application(s) Rectal two times a day  hydrocortisone hemorrhoidal Suppository 1 Suppository(s) Rectal at bedtime  influenza   Vaccine 0.5 milliLiter(s) IntraMuscular once  lactobacillus acidophilus 1 Tablet(s) Oral three times a day with meals  lidocaine 2% Gel 1 Application(s) Topical two times a day  OXcarbazepine 300 milliGRAM(s) Oral two times a day  pantoprazole    Tablet 40 milliGRAM(s) Oral before breakfast  propranolol 20 milliGRAM(s) Oral three times a day  senna 2 Tablet(s) Oral at bedtime  sucralfate 1 Gram(s) Oral two times a day  tiotropium 18 MICROgram(s) Capsule 1 Capsule(s) Inhalation daily    MEDICATIONS  (PRN):  acetaminophen   Tablet .. 650 milliGRAM(s) Oral every 6 hours PRN Temp greater or equal to 38C (100.4F), Moderate Pain (4 - 6)  ALBUTerol    0.083% 2.5 milliGRAM(s) Nebulizer every 6 hours PRN Shortness of Breath and/or Wheezing  ondansetron Injectable 4 milliGRAM(s) IV Push every 8 hours PRN Nausea and/or Vomiting         Vitals log        ICU Vital Signs Last 24 Hrs  T(C): 36.4 (02 Jan 2020 04:17), Max: 36.4 (01 Jan 2020 07:48)  T(F): 97.5 (02 Jan 2020 04:17), Max: 97.6 (01 Jan 2020 16:04)  HR: 75 (02 Jan 2020 04:17) (70 - 85)  BP: 104/70 (02 Jan 2020 05:10) (98/66 - 115/78)  BP(mean): --  ABP: --  ABP(mean): --  RR: 18 (02 Jan 2020 04:17) (18 - 20)  SpO2: 97% (02 Jan 2020 04:17) (96% - 100%)           Input and Output:  I&O's Detail    01 Jan 2020 07:01  -  02 Jan 2020 07:00  --------------------------------------------------------  IN:    Oral Fluid: 200 mL  Total IN: 200 mL    OUT:    Voided: 250 mL  Total OUT: 250 mL    Total NET: -50 mL          Lab Data                        12.4   6.81  )-----------( 321      ( 02 Jan 2020 06:35 )             40.0     01-02    x   |  x   |  17  ----------------------------<  91  x    |  32<H>  |  0.49<L>    Ca    9.1      02 Jan 2020 06:35              Review of Systems	      Objective     Physical Examination  head at  heart s1s2  lung dec BS  abd soft        Pertinent Lab findings & Imaging      Shauna:  NO   Adequate UO     I&O's Detail    01 Jan 2020 07:01  -  02 Jan 2020 07:00  --------------------------------------------------------  IN:    Oral Fluid: 200 mL  Total IN: 200 mL    OUT:    Voided: 250 mL  Total OUT: 250 mL    Total NET: -50 mL               Discussed with:     Cultures:	        Radiology

## 2020-01-02 NOTE — PROGRESS NOTE ADULT - PROBLEM SELECTOR PLAN 4
patient reports chronic nausea for the past month. Had an EGD/Colonoscopy this year that was "negative" per patient  -Zofran IV PRN for nausea, if not improving ok to try reglan as long as QT permits as per GI recs, will continue with Zofran, patient with chronic nausea  -Nutrition recommended diet  -GI, Dr. De La Vega, following

## 2020-01-02 NOTE — PROGRESS NOTE ADULT - SUBJECTIVE AND OBJECTIVE BOX
Neurology follow up note    ROMIE [id number]      Interval History:    Patient feels nausea     MEDICATIONS    acetaminophen   Tablet .. 650 milliGRAM(s) Oral every 6 hours PRN  ALBUTerol    0.083% 2.5 milliGRAM(s) Nebulizer every 6 hours PRN  enoxaparin Injectable 40 milliGRAM(s) SubCutaneous daily  hydrocortisone 2.5% Rectal Cream 1 Application(s) Rectal two times a day  hydrocortisone hemorrhoidal Suppository 1 Suppository(s) Rectal two times a day  influenza   Vaccine 0.5 milliLiter(s) IntraMuscular once  lactobacillus acidophilus 1 Tablet(s) Oral three times a day with meals  lidocaine 2% Gel 1 Application(s) Topical three times a day  ondansetron Injectable 4 milliGRAM(s) IV Push every 8 hours PRN  OXcarbazepine 300 milliGRAM(s) Oral two times a day  pantoprazole    Tablet 40 milliGRAM(s) Oral before breakfast  propranolol 20 milliGRAM(s) Oral three times a day  senna 2 Tablet(s) Oral at bedtime  sucralfate 1 Gram(s) Oral two times a day  tiotropium 18 MICROgram(s) Capsule 1 Capsule(s) Inhalation daily  witch hazel Pads 1 Application(s) Topical three times a day      Allergies    IV Contrast (Anaphylaxis)  shellfish. (Anaphylaxis)    Intolerances            Vital Signs Last 24 Hrs  T(C): 36.4 (02 Jan 2020 07:20), Max: 36.4 (01 Jan 2020 16:04)  T(F): 97.6 (02 Jan 2020 07:20), Max: 97.6 (01 Jan 2020 16:04)  HR: 79 (02 Jan 2020 07:20) (70 - 85)  BP: 116/78 (02 Jan 2020 07:20) (98/66 - 116/78)  BP(mean): --  RR: 19 (02 Jan 2020 07:20) (18 - 19)  SpO2: 95% (02 Jan 2020 07:20) (95% - 98%)      REVIEW OF SYSTEMS:  Constitutional:  The patient denies fever, chills, or night sweats.  Head:  No headaches.  Eyes:  No double vision or blurry vision.  Ears:  No ringing in the ears.  Neck:  No neck pain.  Respiratory:  Occasional shortness of breath.  Cardiovascular:  Positive right-sided chest pain.  Abdomen:  occ nausea,  no vomiting, or abdominal pain.  Extremities/Neurological:  No numbness or tingling.  Musculoskeletal:  Occasional joint pain.    PHYSICAL EXAMINATION:   HEENT:  Head:  Normocephalic, atraumatic.  Eyes:  No scleral icterus.  Ears:  Hearing bilaterally appeared to be intact.  NECK:  Supple.  RESPIRATORY:  Decreased breath sounds bilaterally, but most prominent on the right.  CARDIOVASCULAR:  S1 and S2 heard.  ABDOMEN:  Soft and nontender.  Extremities:  No clubbing or cyanosis were noted.      NEUROLOGIC:  The patient is awake and alert.  Location was hospital, year was 2019, month was December.  Was able to name objects.  Extraocular movements were intact.  Pupils were equal, round, and reactive bilaterally 3 mm to 2 mm.  Speech was fluent.  Smile was symmetric.  Motor:  Right upper was 5/5, left upper was 3/5, right lower was 4/5, left lower was 3+/5.  As per my conversation with the spouse, after her apparent event back in March, questionable seizure versus questionable stroke.  She was left with left-sided weakness.  Sensory:  Bilateral upper and lower appeared intact to light touch.            LABS:  CBC Full  -  ( 02 Jan 2020 06:35 )  WBC Count : 6.81 K/uL  RBC Count : 4.31 M/uL  Hemoglobin : 12.4 g/dL  Hematocrit : 40.0 %  Platelet Count - Automated : 321 K/uL  Mean Cell Volume : 92.8 fl  Mean Cell Hemoglobin : 28.8 pg  Mean Cell Hemoglobin Concentration : 31.0 gm/dL  Auto Neutrophil # : x  Auto Lymphocyte # : x  Auto Monocyte # : x  Auto Eosinophil # : x  Auto Basophil # : x  Auto Neutrophil % : x  Auto Lymphocyte % : x  Auto Monocyte % : x  Auto Eosinophil % : x  Auto Basophil % : x      01-02    139  |  101  |  17  ----------------------------<  91  5.0   |  32<H>  |  0.49<L>    Ca    9.1      02 Jan 2020 06:35  Mg     2.2     01-02      Hemoglobin A1C:       Vitamin B12         RADIOLOGY    ANALYSIS AND PLAN:  This is a 59-year-old with a history of possibly epilepsy verse TIAs, history of chronic subdural hydromas and change in mental status.  1.	For episode of change in mental status, as per my conversation with the spouse, these appear to occur primarily at the same time at night for the last three to four weeks.  The patient has been on Trileptal for about eight to nine months.  Suspect less likely this is Trileptal, but I will check Trileptal levels.  Questionable, the patient could have any type of sleep-related disorder causing these events to occur at night.  I spoke with the spouse, the patient should undergo sleep studies.  2.	For history of chronic subdural hematoma, these appeared to have resolved from previous MRI.  3.	For history of possible underlying epilepsy, for now, I will continue the patient on her Trileptal.  4.	For episode of left-sided hemipareses of unclear etiology, as per my conversation with the spouse, there was a question that this was from a seizure event versus possibly a TIA even though MRI did not show cerebrovascular accident.  Unclear, the patient has any type of underlying neurodegenerative disease that could be explaining this.  The patient was seen at [facility] and does follow up with an outside neurologist.  5.	Monitor CO2 levels as needed  6.	spoke outside neurologist Dr. Wallace in past agrees to continue trileptal for now  His telephone number is [phone].  7.	Spouse's name is Marialuisa, his telephone number is [phone] 12/31/19  8.	appears more interactive   9.	trileptal levels ok   10.	no new events   11.	Greater than 35 minutes of time was spent with the patient, plan of care, reviewing data, speaking to the family and multidisciplinary healthcare team    Thank you for the courtesy of this consultation.    Physical therapy evaluation if possible and as tolerated.  OOB to chair/ambulation with assistance only if possible.

## 2020-01-02 NOTE — PHARMACOTHERAPY INTERVENTION NOTE - COMMENTS
Spoke with patient to address any medication questions/concerns/needs.  Had concerns about respiratory medications:  -Explained current respiratory medications  Had questions about medications for hemorrhoid pain  -Explained current active topicals for this condition

## 2020-01-02 NOTE — CHART NOTE - NSCHARTNOTEFT_GEN_A_CORE
Assessment: 58 y/o female adm with right pneumothorax, UTI, colitis. PMH interstitial lung disease, nonhodgkin's lymphoma. Spoke with pt, pt still with poor appetite. + nausea at times. Food preferences obtained. Pt likes shakes. Will rx adding Ensure mixed with ice cream daily.     Factors impacting intake: [ ] none [x ] nausea  [ ] vomiting [ ] diarrhea [ ] constipation  [ ]chewing problems [ ] swallowing issues  [x ] other: decreased appetite    Diet Presciption: Diet, Regular (12-26-19 @ 22:54)    Intake: poor as per pt.     Current Weight: 1/2 118.8# adm 119#  % Weight Change    Pertinent Medications: MEDICATIONS  (STANDING):  enoxaparin Injectable 40 milliGRAM(s) SubCutaneous daily  hydrocortisone 2.5% Rectal Cream 1 Application(s) Rectal two times a day  hydrocortisone hemorrhoidal Suppository 1 Suppository(s) Rectal two times a day  influenza   Vaccine 0.5 milliLiter(s) IntraMuscular once  lactobacillus acidophilus 1 Tablet(s) Oral three times a day with meals  lidocaine 2% Gel 1 Application(s) Topical three times a day  OXcarbazepine 300 milliGRAM(s) Oral two times a day  pantoprazole    Tablet 40 milliGRAM(s) Oral before breakfast  propranolol 20 milliGRAM(s) Oral three times a day  senna 2 Tablet(s) Oral at bedtime  sucralfate 1 Gram(s) Oral two times a day  tiotropium 18 MICROgram(s) Capsule 1 Capsule(s) Inhalation daily  witch hazel Pads 1 Application(s) Topical three times a day    MEDICATIONS  (PRN):  acetaminophen   Tablet .. 650 milliGRAM(s) Oral every 6 hours PRN Temp greater or equal to 38C (100.4F), Moderate Pain (4 - 6)  ALBUTerol    0.083% 2.5 milliGRAM(s) Nebulizer every 6 hours PRN Shortness of Breath and/or Wheezing  ondansetron Injectable 4 milliGRAM(s) IV Push every 8 hours PRN Nausea and/or Vomiting    Pertinent Labs: 01-02 Na139 mmol/L Glu 91 mg/dL K+ 5.0 mmol/L Cr  0.49 mg/dL<L> BUN 17 mg/dL 12-29 Alb 2.6 g/dL<L>     CAPILLARY BLOOD GLUCOSE        Skin: Stage II to sacrum and left gluteal crease.     Estimated Needs:   [x ] no change since previous assessment  [ ] recalculated:     Previous Nutrition Diagnosis:   [ ] Inadequate Energy Intake [ ]Inadequate Oral Intake [ ] Excessive Energy Intake   [ ] Underweight [ ] Increased Nutrient Needs [ ] Overweight/Obesity   [ ] Altered GI Function [ ] Unintended Weight Loss [ ] Food & Nutrition Related Knowledge Deficit [ x] Malnutrition     Nutrition Diagnosis is [x ] ongoing  [ ] resolved [ ] not applicable     New Nutrition Diagnosis: [x ] not applicable       Interventions:   Recommend  [ ] Change Diet To:  [ ] Nutrition Supplement  [ ] Nutrition Support  [ x] Other: rx adding Ensure mixed with ice cream daily. honor pt's food preferences; encourage po intake; encourage adequate protein intake.     Monitoring and Evaluation:   [ ] PO intake [ x ] Tolerance to diet prescription [ x ] weights [ x ] labs[ x ] follow up per protocol  [ x] other: Rx MVI daily; vit C 500 mg BID to help promote wound healing.

## 2020-01-03 LAB
ANION GAP SERPL CALC-SCNC: 4 MMOL/L — LOW (ref 5–17)
BUN SERPL-MCNC: 21 MG/DL — SIGNIFICANT CHANGE UP (ref 7–23)
CALCIUM SERPL-MCNC: 9 MG/DL — SIGNIFICANT CHANGE UP (ref 8.5–10.1)
CHLORIDE SERPL-SCNC: 102 MMOL/L — SIGNIFICANT CHANGE UP (ref 96–108)
CO2 SERPL-SCNC: 37 MMOL/L — HIGH (ref 22–31)
CREAT SERPL-MCNC: 0.52 MG/DL — SIGNIFICANT CHANGE UP (ref 0.5–1.3)
GLUCOSE SERPL-MCNC: 100 MG/DL — HIGH (ref 70–99)
HCT VFR BLD CALC: 34.5 % — SIGNIFICANT CHANGE UP (ref 34.5–45)
HGB BLD-MCNC: 10.7 G/DL — LOW (ref 11.5–15.5)
MCHC RBC-ENTMCNC: 28.5 PG — SIGNIFICANT CHANGE UP (ref 27–34)
MCHC RBC-ENTMCNC: 31 GM/DL — LOW (ref 32–36)
MCV RBC AUTO: 92 FL — SIGNIFICANT CHANGE UP (ref 80–100)
NRBC # BLD: 0 /100 WBCS — SIGNIFICANT CHANGE UP (ref 0–0)
PLATELET # BLD AUTO: 485 K/UL — HIGH (ref 150–400)
POTASSIUM SERPL-MCNC: 3.3 MMOL/L — LOW (ref 3.5–5.3)
POTASSIUM SERPL-SCNC: 3.3 MMOL/L — LOW (ref 3.5–5.3)
RBC # BLD: 3.75 M/UL — LOW (ref 3.8–5.2)
RBC # FLD: 14.3 % — SIGNIFICANT CHANGE UP (ref 10.3–14.5)
SODIUM SERPL-SCNC: 143 MMOL/L — SIGNIFICANT CHANGE UP (ref 135–145)
WBC # BLD: 7.81 K/UL — SIGNIFICANT CHANGE UP (ref 3.8–10.5)
WBC # FLD AUTO: 7.81 K/UL — SIGNIFICANT CHANGE UP (ref 3.8–10.5)

## 2020-01-03 PROCEDURE — 99232 SBSQ HOSP IP/OBS MODERATE 35: CPT | Mod: GC

## 2020-01-03 RX ORDER — POTASSIUM CHLORIDE 20 MEQ
40 PACKET (EA) ORAL EVERY 4 HOURS
Refills: 0 | Status: COMPLETED | OUTPATIENT
Start: 2020-01-03 | End: 2020-01-03

## 2020-01-03 RX ADMIN — Medication 1 SUPPOSITORY(S): at 17:05

## 2020-01-03 RX ADMIN — ONDANSETRON 4 MILLIGRAM(S): 8 TABLET, FILM COATED ORAL at 17:05

## 2020-01-03 RX ADMIN — Medication 1 APPLICATION(S): at 11:57

## 2020-01-03 RX ADMIN — Medication 1 TABLET(S): at 11:55

## 2020-01-03 RX ADMIN — OXCARBAZEPINE 300 MILLIGRAM(S): 300 TABLET, FILM COATED ORAL at 21:21

## 2020-01-03 RX ADMIN — Medication 1 SUPPOSITORY(S): at 05:21

## 2020-01-03 RX ADMIN — Medication 5 MILLIGRAM(S): at 11:56

## 2020-01-03 RX ADMIN — LIDOCAINE 1 APPLICATION(S): 4 CREAM TOPICAL at 05:21

## 2020-01-03 RX ADMIN — ENOXAPARIN SODIUM 40 MILLIGRAM(S): 100 INJECTION SUBCUTANEOUS at 11:56

## 2020-01-03 RX ADMIN — LIDOCAINE 1 APPLICATION(S): 4 CREAM TOPICAL at 21:24

## 2020-01-03 RX ADMIN — Medication 1 APPLICATION(S): at 21:24

## 2020-01-03 RX ADMIN — Medication 1 GRAM(S): at 17:05

## 2020-01-03 RX ADMIN — Medication 40 MILLIEQUIVALENT(S): at 08:37

## 2020-01-03 RX ADMIN — Medication 40 MILLIEQUIVALENT(S): at 11:55

## 2020-01-03 RX ADMIN — LIDOCAINE 1 APPLICATION(S): 4 CREAM TOPICAL at 13:16

## 2020-01-03 RX ADMIN — Medication 1 GRAM(S): at 05:24

## 2020-01-03 RX ADMIN — AER TRAVELER 1 APPLICATION(S): 0.5 SOLUTION RECTAL; TOPICAL at 05:21

## 2020-01-03 RX ADMIN — Medication 1 TABLET(S): at 17:05

## 2020-01-03 RX ADMIN — TIOTROPIUM BROMIDE 1 CAPSULE(S): 18 CAPSULE ORAL; RESPIRATORY (INHALATION) at 11:56

## 2020-01-03 RX ADMIN — PANTOPRAZOLE SODIUM 40 MILLIGRAM(S): 20 TABLET, DELAYED RELEASE ORAL at 05:24

## 2020-01-03 RX ADMIN — AER TRAVELER 1 APPLICATION(S): 0.5 SOLUTION RECTAL; TOPICAL at 13:17

## 2020-01-03 RX ADMIN — OXCARBAZEPINE 300 MILLIGRAM(S): 300 TABLET, FILM COATED ORAL at 08:37

## 2020-01-03 RX ADMIN — ONDANSETRON 4 MILLIGRAM(S): 8 TABLET, FILM COATED ORAL at 08:38

## 2020-01-03 RX ADMIN — Medication 1 TABLET(S): at 08:37

## 2020-01-03 RX ADMIN — AER TRAVELER 1 APPLICATION(S): 0.5 SOLUTION RECTAL; TOPICAL at 21:24

## 2020-01-03 NOTE — PROGRESS NOTE ADULT - PROBLEM SELECTOR PLAN 3
CT abd/pelvis: Large amount of stool within the rectum with surrounding inflammatory change. s/p 3 day course of IV flagyl for Stercoral colitis. However this was discontinued as it appeared to be more of an IBD picture than infectious colitis.   -Bacid TID, continue PPI, carafate, senna at bedtime.  -GI, Dr. Hutchinson following  -will do abd xray to eval for stool pattern as patient is having persistent nausea and diarrhea which is keeping her from participating in PT.  -will give simethicone for upset stomach, metamucil for IBD

## 2020-01-03 NOTE — PROGRESS NOTE ADULT - SUBJECTIVE AND OBJECTIVE BOX
INTERVAL HPI/OVERNIGHT EVENTS:  pt seen and examined  interim events noted  c/o nausea, abd pain and rectal pain  denies vomiting  per rn no diarrhea, pt passing frequent soft bms    MEDICATIONS  (STANDING):  enoxaparin Injectable 40 milliGRAM(s) SubCutaneous daily  hydrocortisone 2.5% Rectal Cream 1 Application(s) Rectal two times a day  hydrocortisone hemorrhoidal Suppository 1 Suppository(s) Rectal two times a day  influenza   Vaccine 0.5 milliLiter(s) IntraMuscular once  lactobacillus acidophilus 1 Tablet(s) Oral three times a day with meals  lidocaine 2% Gel 1 Application(s) Topical three times a day  OXcarbazepine 300 milliGRAM(s) Oral two times a day  pantoprazole    Tablet 40 milliGRAM(s) Oral before breakfast  potassium chloride    Tablet ER 40 milliEquivalent(s) Oral every 4 hours  propranolol 20 milliGRAM(s) Oral three times a day  psyllium Powder 1 Packet(s) Oral daily  senna 2 Tablet(s) Oral at bedtime  sucralfate 1 Gram(s) Oral two times a day  tiotropium 18 MICROgram(s) Capsule 1 Capsule(s) Inhalation daily  witch hazel Pads 1 Application(s) Topical three times a day    MEDICATIONS  (PRN):  acetaminophen   Tablet .. 650 milliGRAM(s) Oral every 6 hours PRN Temp greater or equal to 38C (100.4F), Mild Pain (1 - 3)  ALBUTerol    0.083% 2.5 milliGRAM(s) Nebulizer every 6 hours PRN Shortness of Breath and/or Wheezing  ketorolac   Injectable 15 milliGRAM(s) IV Push every 4 hours PRN Moderate Pain (4 - 6)  metoclopramide Injectable 5 milliGRAM(s) IV Push every 8 hours PRN nausea/ vomiting  ondansetron Injectable 4 milliGRAM(s) IV Push every 8 hours PRN Nausea and/or Vomiting  simethicone 80 milliGRAM(s) Chew two times a day PRN Upset Stomach      Allergies    IV Contrast (Anaphylaxis)  shellfish. (Anaphylaxis)    Intolerances        Review of Systems:    General:  No wt loss, fevers, chills, night sweats, fatigue   Eyes:  Good vision, no reported pain  ENT:  No sore throat, pain, runny nose, dysphagia  CV:  No pain, palpitations, hypo/hypertension  Resp:  No dyspnea, cough, tachypnea, wheezing  GI:  +rectal and abd pain, +nausea, No vomiting, No diarrhea, No constipation, No weight loss, No fever, No pruritis, No rectal bleeding, No melena, No dysphagia  :  No pain, bleeding, incontinence, nocturia  Muscle:  No pain, weakness  Neuro:  No weakness, tingling, memory problems  Psych:  No fatigue, insomnia, mood problems, depression  Endocrine:  No polyuria, polydypsia, cold/heat intolerance  Heme:  No petechiae, ecchymosis, easy bruisability  Skin:  No rash, tattoos, scars, edema      Vital Signs Last 24 Hrs  T(C): 36.6 (03 Jan 2020 07:20), Max: 36.9 (02 Jan 2020 19:45)  T(F): 97.9 (03 Jan 2020 07:20), Max: 98.4 (02 Jan 2020 19:45)  HR: 99 (03 Jan 2020 07:20) (89 - 126)  BP: 133/- (03 Jan 2020 07:20) (107/75 - 133/-)  BP(mean): --  RR: 18 (03 Jan 2020 07:20) (18 - 18)  SpO2: 96% (03 Jan 2020 07:20) (92% - 100%)    PHYSICAL EXAM:    Constitutional: lying in bed  HEENT: ncat  Neck: No LAD  Gastrointestinal: soft minimal llq ttp no guarding nd on martha (limited as pt deferred complete exam 2/2 pain) toma anal erythema and +hemorrhoids  Extremities: No peripheral edema  Neurological: Awake alert responds appropriately      LABS:                        10.7   7.81  )-----------( 485      ( 03 Jan 2020 06:30 )             34.5     01-03    143  |  102  |  21  ----------------------------<  100<H>  3.3<L>   |  37<H>  |  0.52    Ca    9.0      03 Jan 2020 06:30  Mg     2.2     01-02            RADIOLOGY & ADDITIONAL TESTS: INTERVAL HPI/OVERNIGHT EVENTS:  pt seen and examined  interim events noted  c/o nausea, abd pain and rectal pain  denies vomiting  per rn no diarrhea, pt passing frequent soft bms    MEDICATIONS  (STANDING):  enoxaparin Injectable 40 milliGRAM(s) SubCutaneous daily  hydrocortisone 2.5% Rectal Cream 1 Application(s) Rectal two times a day  hydrocortisone hemorrhoidal Suppository 1 Suppository(s) Rectal two times a day  influenza   Vaccine 0.5 milliLiter(s) IntraMuscular once  lactobacillus acidophilus 1 Tablet(s) Oral three times a day with meals  lidocaine 2% Gel 1 Application(s) Topical three times a day  OXcarbazepine 300 milliGRAM(s) Oral two times a day  pantoprazole    Tablet 40 milliGRAM(s) Oral before breakfast  potassium chloride    Tablet ER 40 milliEquivalent(s) Oral every 4 hours  propranolol 20 milliGRAM(s) Oral three times a day  psyllium Powder 1 Packet(s) Oral daily  senna 2 Tablet(s) Oral at bedtime  sucralfate 1 Gram(s) Oral two times a day  tiotropium 18 MICROgram(s) Capsule 1 Capsule(s) Inhalation daily  witch hazel Pads 1 Application(s) Topical three times a day    MEDICATIONS  (PRN):  acetaminophen   Tablet .. 650 milliGRAM(s) Oral every 6 hours PRN Temp greater or equal to 38C (100.4F), Mild Pain (1 - 3)  ALBUTerol    0.083% 2.5 milliGRAM(s) Nebulizer every 6 hours PRN Shortness of Breath and/or Wheezing  ketorolac   Injectable 15 milliGRAM(s) IV Push every 4 hours PRN Moderate Pain (4 - 6)  metoclopramide Injectable 5 milliGRAM(s) IV Push every 8 hours PRN nausea/ vomiting  ondansetron Injectable 4 milliGRAM(s) IV Push every 8 hours PRN Nausea and/or Vomiting  simethicone 80 milliGRAM(s) Chew two times a day PRN Upset Stomach      Allergies    IV Contrast (Anaphylaxis)  shellfish. (Anaphylaxis)    Intolerances        Review of Systems:    General:  No wt loss, fevers, chills, night sweats, fatigue   Eyes:  Good vision, no reported pain  ENT:  No sore throat, pain, runny nose, dysphagia  CV:  No pain, palpitations, hypo/hypertension  Resp:  No dyspnea, cough, tachypnea, wheezing  GI:  +rectal and abd pain, +nausea, No vomiting, No diarrhea, No constipation, No weight loss, No fever, No pruritis, No rectal bleeding, No melena, No dysphagia  :  No pain, bleeding, incontinence, nocturia  Muscle:  No pain, weakness  Neuro:  No weakness, tingling, memory problems  Psych:  No fatigue, insomnia, mood problems, depression  Endocrine:  No polyuria, polydypsia, cold/heat intolerance  Heme:  No petechiae, ecchymosis, easy bruisability  Skin:  No rash, tattoos, scars, edema      Vital Signs Last 24 Hrs  T(C): 36.6 (03 Jan 2020 07:20), Max: 36.9 (02 Jan 2020 19:45)  T(F): 97.9 (03 Jan 2020 07:20), Max: 98.4 (02 Jan 2020 19:45)  HR: 99 (03 Jan 2020 07:20) (89 - 126)  BP: 133/- (03 Jan 2020 07:20) (107/75 - 133/-)  BP(mean): --  RR: 18 (03 Jan 2020 07:20) (18 - 18)  SpO2: 96% (03 Jan 2020 07:20) (92% - 100%)    PHYSICAL EXAM:    Constitutional: lying in bed  HEENT: ncat  Neck: No LAD  Gastrointestinal: soft minimal llq ttp no guarding nd on martha (limited as pt deferred complete exam 2/2 pain) toma anal erythema and +hemorrhoids did not assess for possible fecal impaction 2/2 pain  Extremities: No peripheral edema  Neurological: Awake alert responds appropriately      LABS:                        10.7   7.81  )-----------( 485      ( 03 Jan 2020 06:30 )             34.5     01-03    143  |  102  |  21  ----------------------------<  100<H>  3.3<L>   |  37<H>  |  0.52    Ca    9.0      03 Jan 2020 06:30  Mg     2.2     01-02            RADIOLOGY & ADDITIONAL TESTS:

## 2020-01-03 NOTE — PROGRESS NOTE ADULT - PROBLEM SELECTOR PLAN 5
-Continue Propranolol TID with hold parameters  - 1/2/20 patient had episodes of nonsustained tachycardia to the 120s (propanolol was held for low BP in the AM and afternoon).  Patient was given a bolus of fluid and received her third dose in the PM.  No other events on tele

## 2020-01-03 NOTE — PROGRESS NOTE ADULT - PROBLEM SELECTOR PLAN 1
overnight events noted - abd pain eval in progress - dc planning under way -    serial cxr noted and reviewed - no worsening - if anything improvement is noted - clinically pt is fine - without resp distress and or HD instability  no need for any more Imaging related to PTX  supportive care  o2 support  pt has o2 at home  pt has chronic lung disease - ILD  will follow up with Dr. Bettye Hazel in [facility] - for her Pulmonary Needs -   I jesus if able to cooperate  out of bed as tolerated  medical supportive care and regimen  discussed with patient.

## 2020-01-03 NOTE — PROGRESS NOTE ADULT - ATTENDING COMMENTS
59F, hx interstitial lung dz/multiple pneumothoraces/2L home O2, hx pulm nodules, hx hepatic lesions, Meneire's dz, NHL/chemotherapy, hx CVA 3/2019/residual R weakness, hx seizures, hx GIB, hx possible IBS - mgmt for R pnthx assoc w/lung trapping - managed conservatively w/suppl O2, close monitoring w/serial CXRs as per Pulm recomms; +UTI, also stercolitis s/p IV abxs - w/increased c/f IBS - being optimized  -pneumothorax - CXR showed decreasing size - repeat CXR 1/1/20 unchanged; continued suppl O2 - pt on 2L home oxygen at baseline  -UTI - completed course ceftriaxone  -hx stercolitis - s/p short course flagyl - per GI, less c/f infectious process - possible IBS/inflammatory process - on anusol suppositories  -seizures - continue trileptal - pt to continue f/u o/p per Neuro for further eval c/f sleep d/o  -hx hepatic lesions - noted on CTAP - also present on previous CTAP 4/2019 - pt to continue o/p f/u as previously  -dvt prophy  -PT mobilization - recomm [facility], but pt not amenable; d/w  [phone] yesterday, who expressed concern re pt mobility and need for rehab - they will discuss further

## 2020-01-03 NOTE — PROGRESS NOTE ADULT - SUBJECTIVE AND OBJECTIVE BOX
Neurology follow up note    ROMIE [id number]      Interval History:    Patient feels ok events noted now feels better     MEDICATIONS    acetaminophen   Tablet .. 650 milliGRAM(s) Oral every 6 hours PRN  ALBUTerol    0.083% 2.5 milliGRAM(s) Nebulizer every 6 hours PRN  enoxaparin Injectable 40 milliGRAM(s) SubCutaneous daily  hydrocortisone 2.5% Rectal Cream 1 Application(s) Rectal two times a day  hydrocortisone hemorrhoidal Suppository 1 Suppository(s) Rectal two times a day  influenza   Vaccine 0.5 milliLiter(s) IntraMuscular once  ketorolac   Injectable 15 milliGRAM(s) IV Push every 4 hours PRN  lactobacillus acidophilus 1 Tablet(s) Oral three times a day with meals  lidocaine 2% Gel 1 Application(s) Topical three times a day  metoclopramide Injectable 5 milliGRAM(s) IV Push every 8 hours PRN  ondansetron Injectable 4 milliGRAM(s) IV Push every 8 hours PRN  OXcarbazepine 300 milliGRAM(s) Oral two times a day  pantoprazole    Tablet 40 milliGRAM(s) Oral before breakfast  potassium chloride    Tablet ER 40 milliEquivalent(s) Oral every 4 hours  propranolol 20 milliGRAM(s) Oral three times a day  psyllium Powder 1 Packet(s) Oral daily  senna 2 Tablet(s) Oral at bedtime  simethicone 80 milliGRAM(s) Chew two times a day PRN  sucralfate 1 Gram(s) Oral two times a day  tiotropium 18 MICROgram(s) Capsule 1 Capsule(s) Inhalation daily  witch hazel Pads 1 Application(s) Topical three times a day      Allergies    IV Contrast (Anaphylaxis)  shellfish. (Anaphylaxis)    Intolerances            Vital Signs Last 24 Hrs  T(C): 36.6 (03 Jan 2020 07:20), Max: 36.9 (02 Jan 2020 19:45)  T(F): 97.9 (03 Jan 2020 07:20), Max: 98.4 (02 Jan 2020 19:45)  HR: 99 (03 Jan 2020 07:20) (89 - 126)  BP: 133/- (03 Jan 2020 07:20) (107/75 - 133/-)  BP(mean): --  RR: 18 (03 Jan 2020 07:20) (18 - 18)  SpO2: 96% (03 Jan 2020 07:20) (92% - 100%)        REVIEW OF SYSTEMS:  Constitutional:  The patient denies fever, chills, or night sweats.  Head:  No headaches.  Eyes:  No double vision or blurry vision.  Ears:  No ringing in the ears.  Neck:  No neck pain.  Respiratory:  Occasional shortness of breath.  Cardiovascular:  Positive right-sided chest pain.  Abdomen:  occ nausea,  no vomiting, or abdominal pain.  Extremities/Neurological:  No numbness or tingling.  Musculoskeletal:  Occasional joint pain.    PHYSICAL EXAMINATION:   HEENT:  Head:  Normocephalic, atraumatic.  Eyes:  No scleral icterus.  Ears:  Hearing bilaterally appeared to be intact.  NECK:  Supple.  RESPIRATORY:  Decreased breath sounds bilaterally, but most prominent on the right.  CARDIOVASCULAR:  S1 and S2 heard.  ABDOMEN:  Soft and nontender.  Extremities:  No clubbing or cyanosis were noted.      NEUROLOGIC:  The patient is awake and alert.  Location was [facility], year was 2019, month was December.  Was able to name objects.  Extraocular movements were intact.  Pupils were equal, round, and reactive bilaterally 3 mm to 2 mm.  Speech was fluent.  Smile was symmetric.  Motor:  Right upper was 5/5, left upper was 3/5, right lower was 4/5, left lower was 3+/5.  As per my conversation with the spouse, after her apparent event back in March, questionable seizure versus questionable stroke.  She was left with left-sided weakness.  Sensory:  Bilateral upper and lower appeared intact to light touch.             LABS:  CBC Full  -  ( 03 Jan 2020 06:30 )  WBC Count : 7.81 K/uL  RBC Count : 3.75 M/uL  Hemoglobin : 10.7 g/dL  Hematocrit : 34.5 %  Platelet Count - Automated : 485 K/uL  Mean Cell Volume : 92.0 fl  Mean Cell Hemoglobin : 28.5 pg  Mean Cell Hemoglobin Concentration : 31.0 gm/dL  Auto Neutrophil # : x  Auto Lymphocyte # : x  Auto Monocyte # : x  Auto Eosinophil # : x  Auto Basophil # : x  Auto Neutrophil % : x  Auto Lymphocyte % : x  Auto Monocyte % : x  Auto Eosinophil % : x  Auto Basophil % : x      01-03    143  |  102  |  21  ----------------------------<  100<H>  3.3<L>   |  37<H>  |  0.52    Ca    9.0      03 Jan 2020 06:30  Mg     2.2     01-02      Hemoglobin A1C:       Vitamin B12         RADIOLOGY    1.	For episode of change in mental status, as per my conversation with the spouse, these appear to occur primarily at the same time at night for the last three to four weeks.  The patient has been on Trileptal for about eight to nine months.  Suspect less likely this is Trileptal, but I will check Trileptal levels.  Questionable, the patient could have any type of sleep-related disorder causing these events to occur at night.  I spoke with the spouse, the patient should undergo sleep studies.  2.	For history of chronic subdural hematoma, these appeared to have resolved from previous MRI.  3.	For history of possible underlying epilepsy, for now, I will continue the patient on her Trileptal.  4.	For episode of left-sided hemipareses of unclear etiology, as per my conversation with the spouse, there was a question that this was from a seizure event versus possibly a TIA even though MRI did not show cerebrovascular accident.  Unclear, the patient has any type of underlying neurodegenerative disease that could be explaining this.  The patient was seen at [facility] and does follow up with an outside neurologist.  5.	Monitor CO2 levels as needed  6.	spoke outside neurologist Dr. Wallace in past agrees to continue trileptal for now  His telephone number is [phone].  7.	Spouse's name is Marialuisa, his telephone number is [phone] 12/31/19  8.	appears more interactive   9.	trileptal levels ok   10.	no new events   11.	Greater than 30 minutes of time was spent with the patient, plan of care, reviewing data, speaking to the family and multidisciplinary healthcare team    Thank you for the courtesy of this consultation.    Physical therapy evaluation if possible and as tolerated.  OOB to chair/ambulation with assistance only if possible.

## 2020-01-03 NOTE — PROGRESS NOTE ADULT - SUBJECTIVE AND OBJECTIVE BOX
Date/Time Patient Seen:  		  Referring MD:   Data Reviewed	       Patient is a 59y old  Female who presents with a chief complaint of pneumothorax, colitis, UTI (02 Jan 2020 13:50)      Subjective/HPI     PAST MEDICAL & SURGICAL HISTORY:  Interstitial lung disease: on home o2 prn  NHL (non-Hodgkin's lymphoma): Agem 43 sp chemo/rt/stem cell  Transient cerebral ischemia, unspecified type  Mitral prolapse  Pulmonary disease  History of tonsillectomy  History of appendectomy        Medication list         MEDICATIONS  (STANDING):  enoxaparin Injectable 40 milliGRAM(s) SubCutaneous daily  hydrocortisone 2.5% Rectal Cream 1 Application(s) Rectal two times a day  hydrocortisone hemorrhoidal Suppository 1 Suppository(s) Rectal two times a day  influenza   Vaccine 0.5 milliLiter(s) IntraMuscular once  lactobacillus acidophilus 1 Tablet(s) Oral three times a day with meals  lidocaine 2% Gel 1 Application(s) Topical three times a day  OXcarbazepine 300 milliGRAM(s) Oral two times a day  pantoprazole    Tablet 40 milliGRAM(s) Oral before breakfast  potassium chloride    Tablet ER 40 milliEquivalent(s) Oral every 4 hours  propranolol 20 milliGRAM(s) Oral three times a day  psyllium Powder 1 Packet(s) Oral daily  senna 2 Tablet(s) Oral at bedtime  sucralfate 1 Gram(s) Oral two times a day  tiotropium 18 MICROgram(s) Capsule 1 Capsule(s) Inhalation daily  witch hazel Pads 1 Application(s) Topical three times a day    MEDICATIONS  (PRN):  acetaminophen   Tablet .. 650 milliGRAM(s) Oral every 6 hours PRN Temp greater or equal to 38C (100.4F), Mild Pain (1 - 3)  ALBUTerol    0.083% 2.5 milliGRAM(s) Nebulizer every 6 hours PRN Shortness of Breath and/or Wheezing  ketorolac   Injectable 15 milliGRAM(s) IV Push every 4 hours PRN Moderate Pain (4 - 6)  metoclopramide Injectable 5 milliGRAM(s) IV Push every 8 hours PRN nausea/ vomiting  ondansetron Injectable 4 milliGRAM(s) IV Push every 8 hours PRN Nausea and/or Vomiting  simethicone 80 milliGRAM(s) Chew two times a day PRN Upset Stomach         Vitals log        ICU Vital Signs Last 24 Hrs  T(C): 36.6 (03 Jan 2020 07:20), Max: 36.9 (02 Jan 2020 19:45)  T(F): 97.9 (03 Jan 2020 07:20), Max: 98.4 (02 Jan 2020 19:45)  HR: 99 (03 Jan 2020 07:20) (89 - 126)  BP: 133/- (03 Jan 2020 07:20) (107/75 - 133/-)  BP(mean): --  ABP: --  ABP(mean): --  RR: 18 (03 Jan 2020 07:20) (18 - 18)  SpO2: 96% (03 Jan 2020 07:20) (92% - 100%)           Input and Output:  I&O's Detail      Lab Data                        10.7   7.81  )-----------( 485      ( 03 Jan 2020 06:30 )             34.5     01-03    143  |  102  |  21  ----------------------------<  100<H>  3.3<L>   |  37<H>  |  0.52    Ca    9.0      03 Jan 2020 06:30  Mg     2.2     01-02              Review of Systems	      Objective     Physical Examination    head at  heart s1s2  lung dec BS  abd soft      Pertinent Lab findings & Imaging      Shauna:  NO   Adequate UO     I&O's Detail           Discussed with:     Cultures:	        Radiology

## 2020-01-03 NOTE — PROGRESS NOTE ADULT - SUBJECTIVE AND OBJECTIVE BOX
Patient is a 59y old  Female who presents with a chief complaint of pneumothorax, colitis, UTI (03 Jan 2020 08:00)      FROM ADMISSION H+P:   HPI:  59F with PMH of interstitial lung disease hx of pneumothorax (on 2L NC at home), hx pulmonary nodules, Meniere's disease, Non-Hodgkin's lymphoma (SCD/XRT/chemo at [facility] 2003), hx of CVA (3/2019 - residual L sided weakness, unable to use her L arm), not on ASA or Plavix due to GIB, Seizure disorder, tachycardia, MVP, hx of chronic SDH, presents with weakness, weight loss, nausea, and diarrhea for the past month. Patient admits to 4 lb weight loss in 3 weeks, was seen by her PCP and started on Zofran one week ago. Patient reports going to wound care prior to admission for bed sores, and was advised ED evaluation. Per  at bedside, patient has been having loose BMs several times a day with foul smelling urine for 4 days. Denies fever. Patient also has felt too weak to get out of bed and started using a diaper to urinate/have BMs. Has mainly been wheelchair bound recently due to weakness, but at baseline patient is able to stand and walk short distances. Patient uses 2L NC at home and noticed her SpO2 at 75% on RA with ambulation. Patient was also switched to Oxcarbazepine 2-3 months ago and noticed her symptoms of anxiety, weakness, and "memory" have worsened.  Of note, at night when patient takes her Oxcarbazepine, she starts yelling and having "hallucinations."     In the ED: temp 97.6, HR 97, /80, RR 15, SpO2 92% RA, 98% on 3L NC. WBC 11.10. UA: moderate LEC, WBC 26-50, moderate bacteria. Chest x-ray: Interval enlargement of a right-sided pneumothorax. Small bilateral pleural effusions. Chronic lung fibrotic changes. CT chest/abd/pelvis: Moderate-sized right pneumothorax. Worsening groundglass opacities in both lower lobes, possibly infection. Stercoral colitis. Indeterminant hepatic lesions, possibly metastatic disease. Received IV Rocephin, IV Flagyl, 1L NS Bolus. EKG: NSR 88    Of note,  wants us to check oxcarbazepine levels. (26 Dec 2019 21:14)      ----  INTERVAL HPI/OVERNIGHT EVENTS: Pt seen and evaluated at the bedside. No acute overnight events occurred. Patient still complaining of nausea in the morning that gets better with Zofran.  Patient states she has been having diarrhea and is open to trying Metamucil to help.  Patient also complaining of vaginal pain which she states she is getting a cream for.  Patient states she will not be going to rehab but does not feel well enough to go home.      On tele:  Patient had nonsustained tachycardia to the 120 around 9pm and 11pm last night.   ----  PAST MEDICAL & SURGICAL HISTORY:  Interstitial lung disease: on home o2 prn  NHL (non-Hodgkin's lymphoma): Agem 43 sp chemo/rt/stem cell  Transient cerebral ischemia, unspecified type  Mitral prolapse  History of tonsillectomy  History of appendectomy      FAMILY HISTORY:  Family history of stroke  Family history of breast cancer: Mother      Allergies    IV Contrast (Anaphylaxis)  shellfish. (Anaphylaxis)    Intolerances        ----  REVIEW OF SYSTEMS:  CONSTITUTIONAL: denies fever, chills, fatigue, weakness  HEENT: denies blurred vision, sore throat  SKIN: denies new lesions, rash  CARDIOVASCULAR: denies chest pain, chest pressure, palpitations  RESPIRATORY: denies shortness of breath, sputum production  GASTROINTESTINAL: admits to nausea, loose stool, denies, vomiting, abdominal pain  GENITOURINARY: vaginal burning, denies dysuria, discharge  NEUROLOGICAL: denies numbness, headache, focal weakness  MUSCULOSKELETAL: denies new joint pain, muscle aches  HEMATOLOGIC: denies gross bleeding, bruising  LYMPHATICS: denies enlarged lymph nodes, extremity swelling      ----  PHYSICAL EXAM:  GENERAL: patient appears well, no acute distress, appropriately interactive  EYES: sclera clear, no exudates  ENMT: oropharynx clear without erythema, moist mucous membranes  NECK: supple, soft, no thyromegaly noted  LUNGS: good air entry bilaterally, clear to auscultation, symmetric breath sounds, no wheezing or rhonchi appreciated  HEART: soft S1/S2, regular rate and rhythm, no murmurs noted, no noted edema to b/l LE  GASTROINTESTINAL: abdomen is soft, nontender, nondistended, normoactive bowel sounds, no palpable masses  INTEGUMENT: good skin turgor, appropriate for ethnicity, appears well perfused, no jaundice noted  MUSCULOSKELETAL: no clubbing or cyanosis, no obvious deformity, LUE weakness.   NEUROLOGIC: awake, alert, oriented x3, good muscle tone in 4 extremities, no obvious sensory deficits  PSYCHIATRIC: mood is good, affect is congruent with mood, linear and logical thought process  HEME/LYMPH: no palpable supraclavicular nodules, no obvious ecchymosis     T(C): 36.6 (01-03-20 @ 07:20), Max: 36.9 (01-02-20 @ 19:45)  HR: 99 (01-03-20 @ 07:20) (89 - 126)  BP: 133/- (01-03-20 @ 07:20) (107/75 - 133/-)  RR: 18 (01-03-20 @ 07:20) (18 - 18)  SpO2: 96% (01-03-20 @ 07:20) (92% - 100%)  Wt(kg): --    ----  I&O's Summary      LABS:                        10.7   7.81  )-----------( 485      ( 03 Jan 2020 06:30 )             34.5     01-03    143  |  102  |  21  ----------------------------<  100<H>  3.3<L>   |  37<H>  |  0.52    Ca    9.0      03 Jan 2020 06:30  Mg     2.2     01-02          CAPILLARY BLOOD GLUCOSE                    ----  Personally reviewed:  Vital sign trends: [ x ] yes    [  ] no     [  ] n/a  Laboratory results: [ x ] yes    [  ] no     [  ] n/a  Radiology results: [  ] yes    [  ] no     [ x ] n/a  Culture results: [  ] yes    [  ] no     [ x ] n/a  Consultant recommendations: [ x ] yes    [  ] no     [  ] n/a

## 2020-01-03 NOTE — PROGRESS NOTE ADULT - PROBLEM SELECTOR PLAN 1
ct showing stercoral colitis c/b rectal pain in setting of hemorrhoids  prior gi notes reviewed, hx of ibs  f/u am axr  bowel regimen as tolerated  cont lido and hydrocortisone cream   cont anusol supp bid, witch hazel pads, sitz baths   can add bentyl prn for pain/cramping  diet as tolerated  monitor exam/gi fxn  up to date w colonoscopy, 4/2019 showed only non bleeding internal hemorrhoids  further plan to be dw attg ct showing stercoral colitis c/b rectal pain in setting of hemorrhoids  prior gi notes reviewed, hx of ibs  f/u am axr  bowel regimen as tolerated  cont lido and hydrocortisone cream   cont anusol supp bid, witch hazel pads, sitz baths   can add bentyl prn for pain/cramping  diet as tolerated  monitor exam/gi fxn  up to date w colonoscopy, 4/2019 showed only non bleeding internal hemorrhoids  further plan to be dw attg, may need further imaging given persistence of symptoms ct showing stercoral colitis c/b rectal pain in setting of hemorrhoids  prior gi notes reviewed, hx of ibs  f/u am axr  bowel regimen as tolerated  cont lido and hydrocortisone cream   cont anusol supp bid, witch hazel pads, sitz baths   can add bentyl prn for pain/cramping  diet as tolerated  monitor exam/gi fxn  up to date w colonoscopy, 4/2019 showed only non bleeding internal hemorrhoids  if no improvement, will consider possible repeat colonoscopy some time next week

## 2020-01-04 LAB
ANION GAP SERPL CALC-SCNC: 6 MMOL/L — SIGNIFICANT CHANGE UP (ref 5–17)
BUN SERPL-MCNC: 10 MG/DL — SIGNIFICANT CHANGE UP (ref 7–23)
CALCIUM SERPL-MCNC: 9.7 MG/DL — SIGNIFICANT CHANGE UP (ref 8.5–10.1)
CHLORIDE SERPL-SCNC: 98 MMOL/L — SIGNIFICANT CHANGE UP (ref 96–108)
CO2 SERPL-SCNC: 34 MMOL/L — HIGH (ref 22–31)
CREAT SERPL-MCNC: 0.43 MG/DL — LOW (ref 0.5–1.3)
GLUCOSE SERPL-MCNC: 104 MG/DL — HIGH (ref 70–99)
HCT VFR BLD CALC: 39 % — SIGNIFICANT CHANGE UP (ref 34.5–45)
HGB BLD-MCNC: 12.1 G/DL — SIGNIFICANT CHANGE UP (ref 11.5–15.5)
MAGNESIUM SERPL-MCNC: 2.3 MG/DL — SIGNIFICANT CHANGE UP (ref 1.6–2.6)
MCHC RBC-ENTMCNC: 28.3 PG — SIGNIFICANT CHANGE UP (ref 27–34)
MCHC RBC-ENTMCNC: 31 GM/DL — LOW (ref 32–36)
MCV RBC AUTO: 91.3 FL — SIGNIFICANT CHANGE UP (ref 80–100)
NRBC # BLD: 0 /100 WBCS — SIGNIFICANT CHANGE UP (ref 0–0)
PLATELET # BLD AUTO: 568 K/UL — HIGH (ref 150–400)
POTASSIUM SERPL-MCNC: 4.3 MMOL/L — SIGNIFICANT CHANGE UP (ref 3.5–5.3)
POTASSIUM SERPL-SCNC: 4.3 MMOL/L — SIGNIFICANT CHANGE UP (ref 3.5–5.3)
RBC # BLD: 4.27 M/UL — SIGNIFICANT CHANGE UP (ref 3.8–5.2)
RBC # FLD: 14.4 % — SIGNIFICANT CHANGE UP (ref 10.3–14.5)
SODIUM SERPL-SCNC: 138 MMOL/L — SIGNIFICANT CHANGE UP (ref 135–145)
WBC # BLD: 8.3 K/UL — SIGNIFICANT CHANGE UP (ref 3.8–10.5)
WBC # FLD AUTO: 8.3 K/UL — SIGNIFICANT CHANGE UP (ref 3.8–10.5)

## 2020-01-04 PROCEDURE — 99233 SBSQ HOSP IP/OBS HIGH 50: CPT

## 2020-01-04 RX ADMIN — PANTOPRAZOLE SODIUM 40 MILLIGRAM(S): 20 TABLET, DELAYED RELEASE ORAL at 06:25

## 2020-01-04 RX ADMIN — Medication 1 GRAM(S): at 17:41

## 2020-01-04 RX ADMIN — ONDANSETRON 4 MILLIGRAM(S): 8 TABLET, FILM COATED ORAL at 07:49

## 2020-01-04 RX ADMIN — Medication 1 SUPPOSITORY(S): at 06:24

## 2020-01-04 RX ADMIN — OXCARBAZEPINE 300 MILLIGRAM(S): 300 TABLET, FILM COATED ORAL at 21:18

## 2020-01-04 RX ADMIN — LIDOCAINE 1 APPLICATION(S): 4 CREAM TOPICAL at 21:19

## 2020-01-04 RX ADMIN — Medication 1 TABLET(S): at 07:49

## 2020-01-04 RX ADMIN — Medication 1 PACKET(S): at 11:47

## 2020-01-04 RX ADMIN — SENNA PLUS 2 TABLET(S): 8.6 TABLET ORAL at 21:18

## 2020-01-04 RX ADMIN — Medication 1 SUPPOSITORY(S): at 17:41

## 2020-01-04 RX ADMIN — OXCARBAZEPINE 300 MILLIGRAM(S): 300 TABLET, FILM COATED ORAL at 09:48

## 2020-01-04 RX ADMIN — Medication 1 GRAM(S): at 06:25

## 2020-01-04 RX ADMIN — Medication 15 MILLIGRAM(S): at 23:52

## 2020-01-04 RX ADMIN — LIDOCAINE 1 APPLICATION(S): 4 CREAM TOPICAL at 06:24

## 2020-01-04 RX ADMIN — Medication 1 TABLET(S): at 11:46

## 2020-01-04 RX ADMIN — ENOXAPARIN SODIUM 40 MILLIGRAM(S): 100 INJECTION SUBCUTANEOUS at 11:46

## 2020-01-04 RX ADMIN — TIOTROPIUM BROMIDE 1 CAPSULE(S): 18 CAPSULE ORAL; RESPIRATORY (INHALATION) at 11:48

## 2020-01-04 RX ADMIN — ONDANSETRON 4 MILLIGRAM(S): 8 TABLET, FILM COATED ORAL at 18:59

## 2020-01-04 RX ADMIN — Medication 1 APPLICATION(S): at 21:19

## 2020-01-04 RX ADMIN — Medication 5 MILLIGRAM(S): at 11:45

## 2020-01-04 RX ADMIN — AER TRAVELER 1 APPLICATION(S): 0.5 SOLUTION RECTAL; TOPICAL at 15:41

## 2020-01-04 RX ADMIN — AER TRAVELER 1 APPLICATION(S): 0.5 SOLUTION RECTAL; TOPICAL at 21:29

## 2020-01-04 RX ADMIN — LIDOCAINE 1 APPLICATION(S): 4 CREAM TOPICAL at 15:42

## 2020-01-04 RX ADMIN — Medication 1 TABLET(S): at 17:41

## 2020-01-04 RX ADMIN — AER TRAVELER 1 APPLICATION(S): 0.5 SOLUTION RECTAL; TOPICAL at 06:24

## 2020-01-04 NOTE — PROGRESS NOTE ADULT - ATTENDING COMMENTS
59F, hx interstitial lung dz/multiple pneumothoraces/2L home O2, hx pulm nodules, hx hepatic lesions, Meneire's dz, NHL/chemotherapy, hx CVA 3/2019/residual R weakness, hx seizures, hx GIB, hx possible IBS - mgmt for R pnthx assoc w/lung trapping - managed conservatively w/suppl O2, close monitoring w/serial CXRs as per Pulm recomms; +UTI, also stercolitis s/p IV abxs - w/increased c/f IBS - being optimized - dispo planning  -pneumothorax - CXR showed decreased size - repeat CXR 1/1/20 unchanged; continued suppl O2 - pt on 2L home oxygen at baseline  -UTI - completed course ceftriaxone  -hx stercolitis - s/p short course flagyl - per GI, less c/f infectious process - possible IBS/inflammatory process - on anusol suppositories  -seizures - continue trileptal - pt to continue f/u o/p per Neuro for further eval c/f sleep d/o  -hx hepatic lesions - noted on CTAP - also present on previous CTAP 4/2019 - pt to continue o/p f/u as previously  -dvt prophy  -PT mobilization - recomm [facility], but pt not amenable; d/w  [phone], who expressed concern re pt mobility and need for rehab - encouraged family to discuss further for decision

## 2020-01-04 NOTE — PROGRESS NOTE ADULT - SUBJECTIVE AND OBJECTIVE BOX
Neurology Follow up note    ROMIE [id number]    HPI:  59F with PMH of interstitial lung disease hx of pneumothorax (on 2L NC at home), hx pulmonary nodules, Meniere's disease, Non-Hodgkin's lymphoma (SCD/XRT/chemo at [facility] 2003), hx of CVA (3/2019 - residual L sided weakness, unable to use her L arm), not on ASA or Plavix due to GIB, Seizure disorder, tachycardia, MVP, hx of chronic SDH, presents with weakness, weight loss, nausea, and diarrhea for the past month. Patient admits to 4 lb weight loss in 3 weeks, was seen by her PCP and started on Zofran one week ago. Patient reports going to wound care prior to admission for bed sores, and was advised ED evaluation. Per  at bedside, patient has been having loose BMs several times a day with foul smelling urine for 4 days. Denies fever. Patient also has felt too weak to get out of bed and started using a diaper to urinate/have BMs. Has mainly been wheelchair bound recently due to weakness, but at baseline patient is able to stand and walk short distances. Patient uses 2L NC at home and noticed her SpO2 at 75% on RA with ambulation. Patient was also switched to Oxcarbazepine 2-3 months ago and noticed her symptoms of anxiety, weakness, and "memory" have worsened.  Of note, at night when patient takes her Oxcarbazepine, she starts yelling and having "hallucinations."     In the ED: temp 97.6, HR 97, /80, RR 15, SpO2 92% RA, 98% on 3L NC. WBC 11.10. UA: moderate LEC, WBC 26-50, moderate bacteria. Chest x-ray: Interval enlargement of a right-sided pneumothorax. Small bilateral pleural effusions. Chronic lung fibrotic changes. CT chest/abd/pelvis: Moderate-sized right pneumothorax. Worsening groundglass opacities in both lower lobes, possibly infection. Stercoral colitis. Indeterminant hepatic lesions, possibly metastatic disease. Received IV Rocephin, IV Flagyl, 1L NS Bolus. EKG: NSR 88    Of note,  wants us to check oxcarbazepine levels. (26 Dec 2019 21:14)      Interval History - no new events.    Patient is seen, chart was reviewed and case was discussed with the treatment team.  Pt is not in any distress.   Lying on bed comfortably.   No events reported overnight.   No clinical seizure was reported.  Sitting on chair bed comfortably.    is at bedside.    Vital Signs Last 24 Hrs  T(C): 36.8 (04 Jan 2020 12:01), Max: 36.9 (03 Jan 2020 19:35)  T(F): 98.2 (04 Jan 2020 12:01), Max: 98.4 (03 Jan 2020 19:35)  HR: 91 (04 Jan 2020 12:01) (76 - 107)  BP: 109/74 (04 Jan 2020 12:01) (109/74 - 139/85)  BP(mean): 3 (04 Jan 2020 04:42) (3 - 3)  RR: 18 (04 Jan 2020 12:01) (17 - 18)  SpO2: 91% (04 Jan 2020 12:01) (91% - 100%)        REVIEW OF SYSTEMS:    Constitutional: No fever, weight loss or fatigue  Eyes: No eye pain, visual disturbances, or discharge  ENT:  No difficulty hearing, tinnitus, vertigo; No sinus or throat pain  Neck: No pain or stiffness  Respiratory: No cough, wheezing, chills or hemoptysis  Cardiovascular: No chest pain, palpitations, shortness of breath, dizziness or leg swelling  Gastrointestinal: No abdominal or epigastric pain.   Genitourinary: No dysuria, frequency, hematuria or incontinence  Neurological: No headaches,   Musculoskeletal: No joint pain or swelling; No muscle, back or extremity pain  Skin: No itching, burning, rashes or lesions   Lymph Nodes: No enlarged glands  Endocrine: No heat or cold intolerance;  Allergy and Immunologic: No hives or eczema    On Neurological Examination:    Mental Status - Pt is alert, awake, oriented X3.Follows commands well and able to answer questions appropriately  .Mood and affect  normal    Speech -  Normal.    Cranial Nerves - Pupils 3 mm equal and reactive to light, extraocular eye movements intact. Pt has no visual field deficit.  Pt has no facial asymmetry. Facial sensation is intact.  Tongue - is in midline.    Muscle tone - is increased on left     Motor Exam - old left hemiparesis arm weaker than leg.    Sensory Exam  Pt withdraws all extremities equally on stimulation. No asymmetry seen.      coordination:    Finger to nose: normal on right      Deep tendon Reflexes - 2 plus all over.          Neck Supple -  Yes.     MEDICATIONS    acetaminophen   Tablet .. 650 milliGRAM(s) Oral every 6 hours PRN  ALBUTerol    0.083% 2.5 milliGRAM(s) Nebulizer every 6 hours PRN  enoxaparin Injectable 40 milliGRAM(s) SubCutaneous daily  hydrocortisone 2.5% Rectal Cream 1 Application(s) Rectal two times a day  hydrocortisone hemorrhoidal Suppository 1 Suppository(s) Rectal two times a day  influenza   Vaccine 0.5 milliLiter(s) IntraMuscular once  ketorolac   Injectable 15 milliGRAM(s) IV Push every 4 hours PRN  lactobacillus acidophilus 1 Tablet(s) Oral three times a day with meals  lidocaine 2% Gel 1 Application(s) Topical three times a day  metoclopramide Injectable 5 milliGRAM(s) IV Push every 8 hours PRN  ondansetron Injectable 4 milliGRAM(s) IV Push every 8 hours PRN  OXcarbazepine 300 milliGRAM(s) Oral two times a day  pantoprazole    Tablet 40 milliGRAM(s) Oral before breakfast  propranolol 20 milliGRAM(s) Oral three times a day  psyllium Powder 1 Packet(s) Oral daily  senna 2 Tablet(s) Oral at bedtime  simethicone 80 milliGRAM(s) Chew two times a day PRN  sucralfate 1 Gram(s) Oral two times a day  tiotropium 18 MICROgram(s) Capsule 1 Capsule(s) Inhalation daily  witch hazel Pads 1 Application(s) Topical three times a day      Allergies    IV Contrast (Anaphylaxis)  shellfish. (Anaphylaxis)    Intolerances        LABS:  CBC Full  -  ( 04 Jan 2020 08:28 )  WBC Count : 8.30 K/uL  RBC Count : 4.27 M/uL  Hemoglobin : 12.1 g/dL  Hematocrit : 39.0 %  Platelet Count - Automated : 568 K/uL  Mean Cell Volume : 91.3 fl  Mean Cell Hemoglobin : 28.3 pg  Mean Cell Hemoglobin Concentration : 31.0 gm/dL  Auto Neutrophil # : x        01-04    138  |  98  |  10  ----------------------------<  104<H>  4.3   |  34<H>  |  0.43<L>    Ca    9.7      04 Jan 2020 08:28  Mg     2.3     01-04      Hemoglobin A1C:     Vitamin B12     RADIOLOGY    ASSESSMENT AND PLAN:      seen for ams mostly confusion at night  hx of cva with residual left hemiparesis  hx of seizure.    continue trileptal for seizure.  Physical therapy evaluation.  OOB to chair/ambulation with assistance only.  Pain is accessed and addressed.  Plan of care was discussed with family. Questions answered.  Would continue to follow.

## 2020-01-04 NOTE — PROGRESS NOTE ADULT - SUBJECTIVE AND OBJECTIVE BOX
Date/Time Patient Seen:  		  Referring MD:   Data Reviewed	       Patient is a 59y old  Female who presents with a chief complaint of pneumothorax, colitis, UTI (03 Jan 2020 11:15)      Subjective/HPI     PAST MEDICAL & SURGICAL HISTORY:  Interstitial lung disease: on home o2 prn  NHL (non-Hodgkin's lymphoma): Agem 43 sp chemo/rt/stem cell  Transient cerebral ischemia, unspecified type  Mitral prolapse  Pulmonary disease  History of tonsillectomy  History of appendectomy        Medication list         MEDICATIONS  (STANDING):  enoxaparin Injectable 40 milliGRAM(s) SubCutaneous daily  hydrocortisone 2.5% Rectal Cream 1 Application(s) Rectal two times a day  hydrocortisone hemorrhoidal Suppository 1 Suppository(s) Rectal two times a day  influenza   Vaccine 0.5 milliLiter(s) IntraMuscular once  lactobacillus acidophilus 1 Tablet(s) Oral three times a day with meals  lidocaine 2% Gel 1 Application(s) Topical three times a day  OXcarbazepine 300 milliGRAM(s) Oral two times a day  pantoprazole    Tablet 40 milliGRAM(s) Oral before breakfast  propranolol 20 milliGRAM(s) Oral three times a day  psyllium Powder 1 Packet(s) Oral daily  senna 2 Tablet(s) Oral at bedtime  sucralfate 1 Gram(s) Oral two times a day  tiotropium 18 MICROgram(s) Capsule 1 Capsule(s) Inhalation daily  witch hazel Pads 1 Application(s) Topical three times a day    MEDICATIONS  (PRN):  acetaminophen   Tablet .. 650 milliGRAM(s) Oral every 6 hours PRN Temp greater or equal to 38C (100.4F), Mild Pain (1 - 3)  ALBUTerol    0.083% 2.5 milliGRAM(s) Nebulizer every 6 hours PRN Shortness of Breath and/or Wheezing  ketorolac   Injectable 15 milliGRAM(s) IV Push every 4 hours PRN Moderate Pain (4 - 6)  metoclopramide Injectable 5 milliGRAM(s) IV Push every 8 hours PRN nausea/ vomiting  ondansetron Injectable 4 milliGRAM(s) IV Push every 8 hours PRN Nausea and/or Vomiting  simethicone 80 milliGRAM(s) Chew two times a day PRN Upset Stomach         Vitals log        ICU Vital Signs Last 24 Hrs  T(C): 36.5 (04 Jan 2020 04:42), Max: 36.9 (03 Jan 2020 19:35)  T(F): 97.7 (04 Jan 2020 04:42), Max: 98.4 (03 Jan 2020 19:35)  HR: 77 (04 Jan 2020 04:42) (77 - 107)  BP: 118/81 (04 Jan 2020 04:42) (118/81 - 139/85)  BP(mean): 3 (04 Jan 2020 04:42) (3 - 3)  ABP: --  ABP(mean): --  RR: 17 (04 Jan 2020 04:42) (17 - 18)  SpO2: 100% (04 Jan 2020 04:42) (91% - 100%)           Input and Output:  I&O's Detail    03 Jan 2020 07:01  -  04 Jan 2020 07:00  --------------------------------------------------------  IN:  Total IN: 0 mL    OUT:    Voided: 500 mL  Total OUT: 500 mL    Total NET: -500 mL          Lab Data                        10.7   7.81  )-----------( 485      ( 03 Jan 2020 06:30 )             34.5     01-03    143  |  102  |  21  ----------------------------<  100<H>  3.3<L>   |  37<H>  |  0.52    Ca    9.0      03 Jan 2020 06:30              Review of Systems	      Objective     Physical Examination  head at  heart s1s2  lung dec BS  abd soft        Pertinent Lab findings & Imaging      Shauna:  NO   Adequate UO     I&O's Detail    03 Jan 2020 07:01  -  04 Jan 2020 07:00  --------------------------------------------------------  IN:  Total IN: 0 mL    OUT:    Voided: 500 mL  Total OUT: 500 mL    Total NET: -500 mL               Discussed with:     Cultures:	        Radiology

## 2020-01-04 NOTE — PROGRESS NOTE ADULT - SUBJECTIVE AND OBJECTIVE BOX
CC/F/U for: pneumothorax, uti, colitis    HPI:  59F with PMH of interstitial lung disease hx of pneumothorax (on 2L NC at home), hx pulmonary nodules, Meniere's disease, Non-Hodgkin's lymphoma (SCD/XRT/chemo at [facility] [place] 2003), hx of CVA (3/2019 - residual L sided weakness, unable to use her L arm), not on ASA or Plavix due to GIB, Seizure disorder, tachycardia, MVP, hx of chronic SDH, presents with weakness, weight loss, nausea, and diarrhea for the past month. Patient admits to 4 lb weight loss in 3 weeks, was seen by her PCP and started on Zofran one week ago. Patient reports going to wound care prior to admission for bed sores, and was advised ED evaluation. Per  at bedside, patient has been having loose BMs several times a day with foul smelling urine for 4 days. Denies fever. Patient also has felt too weak to get out of bed and started using a diaper to urinate/have BMs. Has mainly been wheelchair bound recently due to weakness, but at baseline patient is able to stand and walk short distances. Patient uses 2L NC at home and noticed her SpO2 at 75% on RA with ambulation. Patient was also switched to Oxcarbazepine 2-3 months ago and noticed her symptoms of anxiety, weakness, and "memory" have worsened.  Of note, at night when patient takes her Oxcarbazepine, she starts yelling and having "hallucinations."     In the ED: temp 97.6, HR 97, /80, RR 15, SpO2 92% RA, 98% on 3L NC. WBC 11.10. UA: moderate LEC, WBC 26-50, moderate bacteria. Chest x-ray: Interval enlargement of a right-sided pneumothorax. Small bilateral pleural effusions. Chronic lung fibrotic changes. CT chest/abd/pelvis: Moderate-sized right pneumothorax. Worsening groundglass opacities in both lower lobes, possibly infection. Stercoral colitis. Indeterminant hepatic lesions, possibly metastatic disease. Received IV Rocephin, IV Flagyl, 1L NS Bolus. EKG: NSR 88    Of note,  wants us to check oxcarbazepine levels. (26 Dec 2019 21:14)        INTERVAL HPI/OVERNIGHT EVENTS:  Pt seen and examined at bedside - daughter at bedside; reports diffuse nonspecific pain complaints - reports breathing remains improved; indicates still does not want rehab.     Allergies/Intolerance: IV Contrast (Anaphylaxis)  shellfish. (Anaphylaxis)      MEDICATIONS  (STANDING):  enoxaparin Injectable 40 milliGRAM(s) SubCutaneous daily  hydrocortisone 2.5% Rectal Cream 1 Application(s) Rectal two times a day  hydrocortisone hemorrhoidal Suppository 1 Suppository(s) Rectal two times a day  influenza   Vaccine 0.5 milliLiter(s) IntraMuscular once  lactobacillus acidophilus 1 Tablet(s) Oral three times a day with meals  lidocaine 2% Gel 1 Application(s) Topical three times a day  OXcarbazepine 300 milliGRAM(s) Oral two times a day  pantoprazole    Tablet 40 milliGRAM(s) Oral before breakfast  propranolol 20 milliGRAM(s) Oral three times a day  psyllium Powder 1 Packet(s) Oral daily  senna 2 Tablet(s) Oral at bedtime  sucralfate 1 Gram(s) Oral two times a day  tiotropium 18 MICROgram(s) Capsule 1 Capsule(s) Inhalation daily  witch hazel Pads 1 Application(s) Topical three times a day    MEDICATIONS  (PRN):  acetaminophen   Tablet .. 650 milliGRAM(s) Oral every 6 hours PRN Temp greater or equal to 38C (100.4F), Mild Pain (1 - 3)  ALBUTerol    0.083% 2.5 milliGRAM(s) Nebulizer every 6 hours PRN Shortness of Breath and/or Wheezing  ketorolac   Injectable 15 milliGRAM(s) IV Push every 4 hours PRN Moderate Pain (4 - 6)  metoclopramide Injectable 5 milliGRAM(s) IV Push every 8 hours PRN nausea/ vomiting  ondansetron Injectable 4 milliGRAM(s) IV Push every 8 hours PRN Nausea and/or Vomiting  simethicone 80 milliGRAM(s) Chew two times a day PRN Upset Stomach      ROS: as above; all other systems reviewed and wnl      PHYSICAL EXAMINATION:  Vital Signs Last 24 Hrs  T(C): 36.9 (04 Jan 2020 15:45), Max: 36.9 (03 Jan 2020 19:35)  T(F): 98.5 (04 Jan 2020 15:45), Max: 98.5 (04 Jan 2020 15:45)  HR: 104 (04 Jan 2020 15:45) (76 - 107)  BP: 126/85 (04 Jan 2020 15:45) (109/74 - 133/86)  BP(mean): 3 (04 Jan 2020 04:42) (3 - 3)  RR: 18 (04 Jan 2020 15:45) (17 - 18)  SpO2: 94% (04 Jan 2020 15:45) (91% - 100%)    GENERAL: chronically ill appearing, frail, middle-aged female; NAD  HEAD:  atraumatic  EYES: sclera anicteric  ENMT: mucous membranes dry  NECK: supple  CHEST/LUNG: respirations unlabored; BS coarse, no wheezing  HEART: normal S1, S2  ABDOMEN: BS+, soft, ND, NT   EXTREMITIES:  no edema b/l LEs, no calf tenderness  NEURO: awake, alert, interactive; UE paresis (chronic)      LABS:                        12.1   8.30  )-----------( 568      ( 04 Jan 2020 08:28 )             39.0     01-04    138  |  98  |  10  ----------------------------<  104<H>  4.3   |  34<H>  |  0.43<L>    Ca    9.7      04 Jan 2020 08:28  Mg     2.3     01-04

## 2020-01-04 NOTE — PROGRESS NOTE ADULT - PROBLEM SELECTOR PLAN 1
ct showing stercoral colitis c/b rectal pain in setting of hemorrhoids  prior gi notes reviewed, hx of ibs  f/u am axr  bowel regimen as tolerated  cont lido and hydrocortisone cream   cont anusol supp bid, witch hazel pads, sitz baths   can add bentyl prn for pain/cramping  diet as tolerated  monitor exam/gi fxn  up to date w colonoscopy, 4/2019 showed only non bleeding internal hemorrhoids  if no improvement, will consider possible repeat colonoscopy some time next week

## 2020-01-04 NOTE — PROGRESS NOTE ADULT - PROBLEM SELECTOR PLAN 1
serial cxr noted and reviewed - no worsening - if anything improvement is noted - clinically pt is fine - without resp distress and or HD instability  no need for any more Imaging related to PTX  supportive care  o2 support  pt has o2 at home  pt has chronic lung disease - ILD  will follow up with Dr. Bettye Hazel in [facility] - for her Pulmonary Needs -   I jesus if able to cooperate  out of bed as tolerated  medical supportive care and regimen  discussed with patient.

## 2020-01-05 LAB
ANION GAP SERPL CALC-SCNC: 5 MMOL/L — SIGNIFICANT CHANGE UP (ref 5–17)
BUN SERPL-MCNC: 17 MG/DL — SIGNIFICANT CHANGE UP (ref 7–23)
CALCIUM SERPL-MCNC: 9 MG/DL — SIGNIFICANT CHANGE UP (ref 8.5–10.1)
CHLORIDE SERPL-SCNC: 98 MMOL/L — SIGNIFICANT CHANGE UP (ref 96–108)
CO2 SERPL-SCNC: 34 MMOL/L — HIGH (ref 22–31)
CREAT SERPL-MCNC: 0.55 MG/DL — SIGNIFICANT CHANGE UP (ref 0.5–1.3)
GLUCOSE SERPL-MCNC: 97 MG/DL — SIGNIFICANT CHANGE UP (ref 70–99)
POTASSIUM SERPL-MCNC: 4.2 MMOL/L — SIGNIFICANT CHANGE UP (ref 3.5–5.3)
POTASSIUM SERPL-SCNC: 4.2 MMOL/L — SIGNIFICANT CHANGE UP (ref 3.5–5.3)
SODIUM SERPL-SCNC: 137 MMOL/L — SIGNIFICANT CHANGE UP (ref 135–145)

## 2020-01-05 PROCEDURE — 99233 SBSQ HOSP IP/OBS HIGH 50: CPT

## 2020-01-05 RX ADMIN — ONDANSETRON 4 MILLIGRAM(S): 8 TABLET, FILM COATED ORAL at 20:01

## 2020-01-05 RX ADMIN — AER TRAVELER 1 APPLICATION(S): 0.5 SOLUTION RECTAL; TOPICAL at 14:30

## 2020-01-05 RX ADMIN — AER TRAVELER 1 APPLICATION(S): 0.5 SOLUTION RECTAL; TOPICAL at 05:41

## 2020-01-05 RX ADMIN — Medication 1 GRAM(S): at 05:41

## 2020-01-05 RX ADMIN — Medication 1 SUPPOSITORY(S): at 05:41

## 2020-01-05 RX ADMIN — LIDOCAINE 1 APPLICATION(S): 4 CREAM TOPICAL at 14:30

## 2020-01-05 RX ADMIN — Medication 1 TABLET(S): at 17:13

## 2020-01-05 RX ADMIN — OXCARBAZEPINE 300 MILLIGRAM(S): 300 TABLET, FILM COATED ORAL at 08:24

## 2020-01-05 RX ADMIN — Medication 1 GRAM(S): at 17:13

## 2020-01-05 RX ADMIN — LIDOCAINE 1 APPLICATION(S): 4 CREAM TOPICAL at 21:03

## 2020-01-05 RX ADMIN — Medication 1 APPLICATION(S): at 11:15

## 2020-01-05 RX ADMIN — AER TRAVELER 1 APPLICATION(S): 0.5 SOLUTION RECTAL; TOPICAL at 21:03

## 2020-01-05 RX ADMIN — LIDOCAINE 1 APPLICATION(S): 4 CREAM TOPICAL at 05:41

## 2020-01-05 RX ADMIN — PANTOPRAZOLE SODIUM 40 MILLIGRAM(S): 20 TABLET, DELAYED RELEASE ORAL at 05:41

## 2020-01-05 RX ADMIN — TIOTROPIUM BROMIDE 1 CAPSULE(S): 18 CAPSULE ORAL; RESPIRATORY (INHALATION) at 18:37

## 2020-01-05 RX ADMIN — Medication 15 MILLIGRAM(S): at 00:07

## 2020-01-05 RX ADMIN — Medication 650 MILLIGRAM(S): at 09:32

## 2020-01-05 RX ADMIN — Medication 1 TABLET(S): at 08:24

## 2020-01-05 RX ADMIN — Medication 1 APPLICATION(S): at 21:02

## 2020-01-05 RX ADMIN — Medication 1 SUPPOSITORY(S): at 17:13

## 2020-01-05 RX ADMIN — ENOXAPARIN SODIUM 40 MILLIGRAM(S): 100 INJECTION SUBCUTANEOUS at 11:15

## 2020-01-05 RX ADMIN — Medication 1 TABLET(S): at 11:15

## 2020-01-05 RX ADMIN — OXCARBAZEPINE 300 MILLIGRAM(S): 300 TABLET, FILM COATED ORAL at 21:02

## 2020-01-05 RX ADMIN — Medication 650 MILLIGRAM(S): at 11:08

## 2020-01-05 NOTE — PROGRESS NOTE ADULT - SUBJECTIVE AND OBJECTIVE BOX
CC/F/U for: pneumothorax, colitis, uti    HPI:  59F with PMH of interstitial lung disease hx of pneumothorax (on 2L NC at home), hx pulmonary nodules, Meniere's disease, Non-Hodgkin's lymphoma (SCD/XRT/chemo at [facility] [place]), hx of CVA (3/2019 - residual L sided weakness, unable to use her L arm), not on ASA or Plavix due to GIB, Seizure disorder, tachycardia, MVP, hx of chronic SDH, presents with weakness, weight loss, nausea, and diarrhea for the past month. Patient admits to 4 lb weight loss in 3 weeks, was seen by her PCP and started on Zofran one week ago. Patient reports going to wound care prior to admission for bed sores, and was advised ED evaluation. Per  at bedside, patient has been having loose BMs several times a day with foul smelling urine for 4 days. Denies fever. Patient also has felt too weak to get out of bed and started using a diaper to urinate/have BMs. Has mainly been wheelchair bound recently due to weakness, but at baseline patient is able to stand and walk short distances. Patient uses 2L NC at home and noticed her SpO2 at 75% on RA with ambulation. Patient was also switched to Oxcarbazepine 2-3 months ago and noticed her symptoms of anxiety, weakness, and "memory" have worsened.  Of note, at night when patient takes her Oxcarbazepine, she starts yelling and having "hallucinations."     In the ED: temp 97.6, HR 97, /80, RR 15, SpO2 92% RA, 98% on 3L NC. WBC 11.10. UA: moderate LEC, WBC 26-50, moderate bacteria. Chest x-ray: Interval enlargement of a right-sided pneumothorax. Small bilateral pleural effusions. Chronic lung fibrotic changes. CT chest/abd/pelvis: Moderate-sized right pneumothorax. Worsening groundglass opacities in both lower lobes, possibly infection. Stercoral colitis. Indeterminant hepatic lesions, possibly metastatic disease. Received IV Rocephin, IV Flagyl, 1L NS Bolus. EKG: NSR 88    Of note,  wants us to check oxcarbazepine levels. (26 Dec 2019 21:14)        INTERVAL HPI/OVERNIGHT EVENTS:  Pt seen and examined at bedside - reports having frequent diarrheal stools - on metamucil, senna.     Allergies/Intolerance: IV Contrast (Anaphylaxis)  shellfish. (Anaphylaxis)      MEDICATIONS  (STANDING):  enoxaparin Injectable 40 milliGRAM(s) SubCutaneous daily  hydrocortisone 2.5% Rectal Cream 1 Application(s) Rectal two times a day  hydrocortisone hemorrhoidal Suppository 1 Suppository(s) Rectal two times a day  influenza   Vaccine 0.5 milliLiter(s) IntraMuscular once  lactobacillus acidophilus 1 Tablet(s) Oral three times a day with meals  lidocaine 2% Gel 1 Application(s) Topical three times a day  OXcarbazepine 300 milliGRAM(s) Oral two times a day  pantoprazole    Tablet 40 milliGRAM(s) Oral before breakfast  propranolol 20 milliGRAM(s) Oral three times a day  sucralfate 1 Gram(s) Oral two times a day  tiotropium 18 MICROgram(s) Capsule 1 Capsule(s) Inhalation daily  witch hazel Pads 1 Application(s) Topical three times a day    MEDICATIONS  (PRN):  acetaminophen   Tablet .. 650 milliGRAM(s) Oral every 6 hours PRN Temp greater or equal to 38C (100.4F), Mild Pain (1 - 3)  ALBUTerol    0.083% 2.5 milliGRAM(s) Nebulizer every 6 hours PRN Shortness of Breath and/or Wheezing  ketorolac   Injectable 15 milliGRAM(s) IV Push every 4 hours PRN Moderate Pain (4 - 6)  metoclopramide Injectable 5 milliGRAM(s) IV Push every 8 hours PRN nausea/ vomiting  ondansetron Injectable 4 milliGRAM(s) IV Push every 8 hours PRN Nausea and/or Vomiting  simethicone 80 milliGRAM(s) Chew two times a day PRN Upset Stomach        ROS: as above; all other systems reviewed and wnl      PHYSICAL EXAMINATION:  Vital Signs Last 24 Hrs  T(C): 36.4 (05 Jan 2020 15:18), Max: 37.2 (05 Jan 2020 00:52)  T(F): 97.6 (05 Jan 2020 15:18), Max: 99 (05 Jan 2020 00:52)  HR: 83 (05 Jan 2020 15:18) (78 - 112)  BP: 100/70 (05 Jan 2020 15:18) (94/68 - 136/83)  RR: 18 (05 Jan 2020 15:18) (18 - 18)  SpO2: 99% (05 Jan 2020 15:18) (93% - 99%)    GENERAL: chronically ill appearing, frail, middle-aged female; NAD  HEAD:  atraumatic  EYES: sclera anicteric  ENMT: mucous membranes dry  NECK: supple  CHEST/LUNG: respirations unlabored; BS coarse, no wheezing  HEART: normal S1, S2  ABDOMEN: BS+, soft, ND, NT   EXTREMITIES:  no edema b/l LEs, no calf tenderness  NEURO: awake, alert, interactive; LUE paresis (chronic)      LABS:                        12.1   8.30  )-----------( 568      ( 04 Jan 2020 08:28 )             39.0     01-05    137  |  98  |  17  ----------------------------<  97  4.2   |  34<H>  |  0.55    Ca    9.0      05 Jan 2020 08:01  Mg     2.3     01-04

## 2020-01-05 NOTE — PROGRESS NOTE ADULT - SUBJECTIVE AND OBJECTIVE BOX
Date/Time Patient Seen:  		  Referring MD:   Data Reviewed	       Patient is a 59y old  Female who presents with a chief complaint of pneumothorax, colitis, UTI (04 Jan 2020 19:33)      Subjective/HPI     PAST MEDICAL & SURGICAL HISTORY:  Interstitial lung disease: on home o2 prn  NHL (non-Hodgkin's lymphoma): Agem 43 sp chemo/rt/stem cell  Transient cerebral ischemia, unspecified type  Mitral prolapse  Pulmonary disease  History of tonsillectomy  History of appendectomy        Medication list         MEDICATIONS  (STANDING):  enoxaparin Injectable 40 milliGRAM(s) SubCutaneous daily  hydrocortisone 2.5% Rectal Cream 1 Application(s) Rectal two times a day  hydrocortisone hemorrhoidal Suppository 1 Suppository(s) Rectal two times a day  influenza   Vaccine 0.5 milliLiter(s) IntraMuscular once  lactobacillus acidophilus 1 Tablet(s) Oral three times a day with meals  lidocaine 2% Gel 1 Application(s) Topical three times a day  OXcarbazepine 300 milliGRAM(s) Oral two times a day  pantoprazole    Tablet 40 milliGRAM(s) Oral before breakfast  propranolol 20 milliGRAM(s) Oral three times a day  psyllium Powder 1 Packet(s) Oral daily  senna 2 Tablet(s) Oral at bedtime  sucralfate 1 Gram(s) Oral two times a day  tiotropium 18 MICROgram(s) Capsule 1 Capsule(s) Inhalation daily  witch hazel Pads 1 Application(s) Topical three times a day    MEDICATIONS  (PRN):  acetaminophen   Tablet .. 650 milliGRAM(s) Oral every 6 hours PRN Temp greater or equal to 38C (100.4F), Mild Pain (1 - 3)  ALBUTerol    0.083% 2.5 milliGRAM(s) Nebulizer every 6 hours PRN Shortness of Breath and/or Wheezing  ketorolac   Injectable 15 milliGRAM(s) IV Push every 4 hours PRN Moderate Pain (4 - 6)  metoclopramide Injectable 5 milliGRAM(s) IV Push every 8 hours PRN nausea/ vomiting  ondansetron Injectable 4 milliGRAM(s) IV Push every 8 hours PRN Nausea and/or Vomiting  simethicone 80 milliGRAM(s) Chew two times a day PRN Upset Stomach         Vitals log        ICU Vital Signs Last 24 Hrs  T(C): 36.7 (05 Jan 2020 04:55), Max: 37.2 (05 Jan 2020 00:52)  T(F): 98.1 (05 Jan 2020 04:55), Max: 99 (05 Jan 2020 00:52)  HR: 99 (05 Jan 2020 04:55) (91 - 112)  BP: 111/77 (05 Jan 2020 04:55) (109/74 - 136/83)  BP(mean): --  ABP: --  ABP(mean): --  RR: 18 (05 Jan 2020 04:55) (18 - 18)  SpO2: 95% (05 Jan 2020 04:55) (91% - 98%)           Input and Output:  I&O's Detail    04 Jan 2020 07:01  -  05 Jan 2020 07:00  --------------------------------------------------------  IN:    Oral Fluid: 320 mL  Total IN: 320 mL    OUT:    Voided: 300 mL  Total OUT: 300 mL    Total NET: 20 mL          Lab Data                        12.1   8.30  )-----------( 568      ( 04 Jan 2020 08:28 )             39.0     01-04    138  |  98  |  10  ----------------------------<  104<H>  4.3   |  34<H>  |  0.43<L>    Ca    9.7      04 Jan 2020 08:28  Mg     2.3     01-04              Review of Systems	      Objective     Physical Examination    head at  heart s1s2  lung dec BS  abd soft      Pertinent Lab findings & Imaging      Shauna:  NO   Adequate UO     I&O's Detail    04 Jan 2020 07:01  -  05 Jan 2020 07:00  --------------------------------------------------------  IN:    Oral Fluid: 320 mL  Total IN: 320 mL    OUT:    Voided: 300 mL  Total OUT: 300 mL    Total NET: 20 mL               Discussed with:     Cultures:	        Radiology

## 2020-01-05 NOTE — PROGRESS NOTE ADULT - SUBJECTIVE AND OBJECTIVE BOX
Neurology Follow up note    ROMIE [id number]    HPI:  59F with PMH of interstitial lung disease hx of pneumothorax (on 2L NC at home), hx pulmonary nodules, Meniere's disease, Non-Hodgkin's lymphoma (SCD/XRT/chemo at [facility] [place] 2003), hx of CVA (3/2019 - residual L sided weakness, unable to use her L arm), not on ASA or Plavix due to GIB, Seizure disorder, tachycardia, MVP, hx of chronic SDH, presents with weakness, weight loss, nausea, and diarrhea for the past month. Patient admits to 4 lb weight loss in 3 weeks, was seen by her PCP and started on Zofran one week ago. Patient reports going to wound care prior to admission for bed sores, and was advised ED evaluation. Per  at bedside, patient has been having loose BMs several times a day with foul smelling urine for 4 days. Denies fever. Patient also has felt too weak to get out of bed and started using a diaper to urinate/have BMs. Has mainly been wheelchair bound recently due to weakness, but at baseline patient is able to stand and walk short distances. Patient uses 2L NC at home and noticed her SpO2 at 75% on RA with ambulation. Patient was also switched to Oxcarbazepine 2-3 months ago and noticed her symptoms of anxiety, weakness, and "memory" have worsened.  Of note, at night when patient takes her Oxcarbazepine, she starts yelling and having "hallucinations."     In the ED: temp 97.6, HR 97, /80, RR 15, SpO2 92% RA, 98% on 3L NC. WBC 11.10. UA: moderate LEC, WBC 26-50, moderate bacteria. Chest x-ray: Interval enlargement of a right-sided pneumothorax. Small bilateral pleural effusions. Chronic lung fibrotic changes. CT chest/abd/pelvis: Moderate-sized right pneumothorax. Worsening groundglass opacities in both lower lobes, possibly infection. Stercoral colitis. Indeterminant hepatic lesions, possibly metastatic disease. Received IV Rocephin, IV Flagyl, 1L NS Bolus. EKG: NSR 88    Of note,  wants us to check oxcarbazepine levels. (26 Dec 2019 21:14)      Interval History - reported loose stool    Patient is seen, chart was reviewed and case was discussed with the treatment team.  Pt is not in any distress.   Lying on bed comfortably.   No events reported overnight.   No clinical seizure was reported.  Sitting on chair bed comfortably.    is at bedside.    Vital Signs Last 24 Hrs  T(C): 36.4 (05 Jan 2020 15:18), Max: 37.2 (05 Jan 2020 00:52)  T(F): 97.6 (05 Jan 2020 15:18), Max: 99 (05 Jan 2020 00:52)  HR: 83 (05 Jan 2020 15:18) (78 - 112)  BP: 100/70 (05 Jan 2020 15:18) (94/68 - 136/83)  BP(mean): --  RR: 18 (05 Jan 2020 15:18) (18 - 18)  SpO2: 99% (05 Jan 2020 15:18) (93% - 99%)        REVIEW OF SYSTEMS:    Constitutional: No fever, weight loss or fatigue  Eyes: No eye pain, visual disturbances, or discharge  ENT:  No difficulty hearing, tinnitus, vertigo; No sinus or throat pain  Neck: No pain or stiffness  Respiratory: No cough, wheezing, chills or hemoptysis  Cardiovascular: No chest pain, palpitations, shortness of breath, dizziness or leg swelling  Gastrointestinal: No abdominal or epigastric pain.   Genitourinary: No dysuria, frequency, hematuria or incontinence  Neurological: No headaches,   Musculoskeletal: No joint pain or swelling; No muscle, back or extremity pain  Skin: No itching, burning, rashes or lesions   Lymph Nodes: No enlarged glands  Endocrine: No heat or cold intolerance;  Allergy and Immunologic: No hives or eczema    On Neurological Examination:    Mental Status - Pt is alert, awake, oriented X3.Follows commands well and able to answer questions appropriately  .Mood and affect  normal    Speech -  Normal.    Cranial Nerves - Pupils 3 mm equal and reactive to light, extraocular eye movements intact. Pt has no visual field deficit.  Pt has no facial asymmetry. Facial sensation is intact.  Tongue - is in midline.    Muscle tone - is increased on left     Motor Exam - old left hemiparesis arm weaker than leg.    Sensory Exam  Pt withdraws all extremities equally on stimulation. No asymmetry seen.      coordination:    Finger to nose: normal on right      Deep tendon Reflexes - 2 plus all over.          Neck Supple -  Yes.     MEDICATIONS    acetaminophen   Tablet .. 650 milliGRAM(s) Oral every 6 hours PRN  ALBUTerol    0.083% 2.5 milliGRAM(s) Nebulizer every 6 hours PRN  enoxaparin Injectable 40 milliGRAM(s) SubCutaneous daily  hydrocortisone 2.5% Rectal Cream 1 Application(s) Rectal two times a day  hydrocortisone hemorrhoidal Suppository 1 Suppository(s) Rectal two times a day  influenza   Vaccine 0.5 milliLiter(s) IntraMuscular once  ketorolac   Injectable 15 milliGRAM(s) IV Push every 4 hours PRN  lactobacillus acidophilus 1 Tablet(s) Oral three times a day with meals  lidocaine 2% Gel 1 Application(s) Topical three times a day  metoclopramide Injectable 5 milliGRAM(s) IV Push every 8 hours PRN  ondansetron Injectable 4 milliGRAM(s) IV Push every 8 hours PRN  OXcarbazepine 300 milliGRAM(s) Oral two times a day  pantoprazole    Tablet 40 milliGRAM(s) Oral before breakfast  propranolol 20 milliGRAM(s) Oral three times a day  psyllium Powder 1 Packet(s) Oral daily  senna 2 Tablet(s) Oral at bedtime  simethicone 80 milliGRAM(s) Chew two times a day PRN  sucralfate 1 Gram(s) Oral two times a day  tiotropium 18 MICROgram(s) Capsule 1 Capsule(s) Inhalation daily  witch hazel Pads 1 Application(s) Topical three times a day      Allergies    IV Contrast (Anaphylaxis)  shellfish. (Anaphylaxis)    Intolerances                              12.1   8.30  )-----------( 568      ( 04 Jan 2020 08:28 )             39.0           01-04    138  |  98  |  10  ----------------------------<  104<H>  4.3   |  34<H>  |  0.43<L>    Ca    9.7      04 Jan 2020 08:28  Mg     2.3     01-04      Hemoglobin A1C:     Vitamin B12     RADIOLOGY    ASSESSMENT AND PLAN:      seen for ams mostly confusion at night  hx of cva with residual left hemiparesis  hx of seizure.    no further neuro blanchard  continue trileptal for seizure.  Physical therapy evaluation.  OOB to chair/ambulation with assistance only.  Pain is accessed and addressed.  Plan of care was discussed with family. Questions answered.  Would continue to follow.

## 2020-01-05 NOTE — PROGRESS NOTE ADULT - SUBJECTIVE AND OBJECTIVE BOX
INTERVAL HPI/OVERNIGHT EVENTS:  No new overnight event.  No N/V/D.  Tolerating diet. having bm    Allergies    IV Contrast (Anaphylaxis)  shellfish. (Anaphylaxis)    Intolerances    General:  No wt loss, fevers, chills, night sweats, fatigue,   Eyes:  Good vision, no reported pain  ENT:  No sore throat, pain, runny nose, dysphagia  CV:  No pain, palpitations, hypo/hypertension  Resp:  No dyspnea, cough, tachypnea, wheezing  GI:  No pain, No nausea, No vomiting, No diarrhea, No constipation, No weight loss, No fever, No pruritis, No rectal bleeding, No tarry stools, No dysphagia,  :  No pain, bleeding, incontinence, nocturia  Muscle:  No pain, weakness  Neuro:  No weakness, tingling, memory problems  Psych:  No fatigue, insomnia, mood problems, depression  Endocrine:  No polyuria, polydipsia, cold/heat intolerance  Heme:  No petechiae, ecchymosis, easy bruisability  Skin:  No rash, tattoos, scars, edema      PHYSICAL EXAM:   Vital Signs:  Vital Signs Last 24 Hrs  T(C): 36.4 (2020 15:18), Max: 37.2 (2020 00:52)  T(F): 97.6 (2020 15:18), Max: 99 (2020 00:52)  HR: 83 (2020 15:18) (78 - 112)  BP: 100/70 (2020 15:18) (94/68 - 136/83)  BP(mean): --  RR: 18 (2020 15:18) (18 - 18)  SpO2: 99% (2020 15:18) (93% - 99%)  Daily     Daily Weight in k.6 (2020 04:55)I&O's Summary    2020 07:01  -  2020 07:00  --------------------------------------------------------  IN: 320 mL / OUT: 300 mL / NET: 20 mL    2020 07:01  -  2020 16:50  --------------------------------------------------------  IN: 320 mL / OUT: 0 mL / NET: 320 mL        GENERAL:  Appears stated age, well-groomed, well-nourished, no distress  HEENT:  NC/AT,  conjunctivae clear and pink, no thyromegaly, nodules, adenopathy, no JVD, sclera -anicteric  CHEST:  Full & symmetric excursion, no increased effort, breath sounds clear  HEART:  Regular rhythm, S1, S2, no murmur/rub/S3/S4, no abdominal bruit, no edema  ABDOMEN:  Soft, non-tender, non-distended, normoactive bowel sounds,  no masses ,no hepato-splenomegaly, no signs of chronic liver disease  EXTEREMITIES:  no cyanosis,clubbing or edema  SKIN:  No rash/erythema/ecchymoses/petechiae/wounds/abscess/warm/dry  NEURO:  Alert, oriented, no asterixis, no tremor, no encephalopathy      LABS:                        12.1   8.30  )-----------( 568      ( 2020 08:28 )             39.0     01-05    137  |  98  |  17  ----------------------------<  97  4.2   |  34<H>  |  0.55    Ca    9.0      2020 08:01  Mg     2.3     01-04          amylase   lipase  RADIOLOGY & ADDITIONAL TESTS:

## 2020-01-05 NOTE — PROGRESS NOTE ADULT - PROBLEM SELECTOR PLAN 1
serial cxr noted and reviewed - no worsening - if anything improvement is noted - clinically pt is fine - without resp distress and or HD instability  no need for any more Imaging related to PTX  supportive care  o2 support  pt has o2 at home  pt has chronic lung disease - ILD  [facility] Pul[facility] Needs -   I jesus if able to cooperate  out of bed as tolerated  medical supportive care and regimen  discussed with patient.

## 2020-01-05 NOTE — PROGRESS NOTE ADULT - ATTENDING COMMENTS
59F, hx interstitial lung dz/multiple pneumothoraces/2L home O2, hx pulm nodules, hx hepatic lesions, Meneire's dz, NHL/chemotherapy, hx CVA 3/2019/residual R weakness, hx seizures, hx GIB, hx possible IBS - mgmt for R pnthx assoc w/lung trapping - managed conservatively w/suppl O2, close monitoring w/serial CXRs as per Pulm recomms; +UTI, also stercolitis s/p IV abxs - w/increased c/f IBS - being optimized - dispo planning  -pneumothorax - CXR showed decreased size - repeat CXR 1/1/20 unchanged; continued suppl O2 - pt on baseline level of 2L home oxygen   -UTI - completed course ceftriaxone  -hx stercolitis - s/p short course flagyl - per GI, less c/f infectious process - possible IBS/inflammatory process - on anusol suppositories; pt reports diarrhea - d/c bowel regimen of metamucil, senna tabs  -seizures - continue trileptal - pt to continue f/u o/p per Neuro for further eval c/f sleep d/o  -hx hepatic lesions - noted on CTAP - also present on previous CTAP 4/2019 - pt to continue o/p f/u as previously  -dvt prophy  -PT mobilization - recomm [facility], but pt not amenable; d/w  [phone], who expressed concern re pt mobility and need for rehab - encouraged family to discuss further for decision

## 2020-01-06 PROCEDURE — 99233 SBSQ HOSP IP/OBS HIGH 50: CPT | Mod: GC

## 2020-01-06 RX ORDER — SODIUM CHLORIDE 9 MG/ML
250 INJECTION INTRAMUSCULAR; INTRAVENOUS; SUBCUTANEOUS ONCE
Refills: 0 | Status: COMPLETED | OUTPATIENT
Start: 2020-01-06 | End: 2020-01-06

## 2020-01-06 RX ORDER — SODIUM CHLORIDE 9 MG/ML
500 INJECTION INTRAMUSCULAR; INTRAVENOUS; SUBCUTANEOUS ONCE
Refills: 0 | Status: COMPLETED | OUTPATIENT
Start: 2020-01-06 | End: 2020-01-06

## 2020-01-06 RX ADMIN — Medication 1 TABLET(S): at 09:06

## 2020-01-06 RX ADMIN — OXCARBAZEPINE 300 MILLIGRAM(S): 300 TABLET, FILM COATED ORAL at 21:11

## 2020-01-06 RX ADMIN — PANTOPRAZOLE SODIUM 40 MILLIGRAM(S): 20 TABLET, DELAYED RELEASE ORAL at 05:01

## 2020-01-06 RX ADMIN — OXCARBAZEPINE 300 MILLIGRAM(S): 300 TABLET, FILM COATED ORAL at 09:06

## 2020-01-06 RX ADMIN — LIDOCAINE 1 APPLICATION(S): 4 CREAM TOPICAL at 13:49

## 2020-01-06 RX ADMIN — ENOXAPARIN SODIUM 40 MILLIGRAM(S): 100 INJECTION SUBCUTANEOUS at 11:21

## 2020-01-06 RX ADMIN — Medication 1 APPLICATION(S): at 21:11

## 2020-01-06 RX ADMIN — LIDOCAINE 1 APPLICATION(S): 4 CREAM TOPICAL at 05:02

## 2020-01-06 RX ADMIN — SODIUM CHLORIDE 500 MILLILITER(S): 9 INJECTION INTRAMUSCULAR; INTRAVENOUS; SUBCUTANEOUS at 16:42

## 2020-01-06 RX ADMIN — AER TRAVELER 1 APPLICATION(S): 0.5 SOLUTION RECTAL; TOPICAL at 15:27

## 2020-01-06 RX ADMIN — Medication 1 GRAM(S): at 05:01

## 2020-01-06 RX ADMIN — ONDANSETRON 4 MILLIGRAM(S): 8 TABLET, FILM COATED ORAL at 10:49

## 2020-01-06 RX ADMIN — AER TRAVELER 1 APPLICATION(S): 0.5 SOLUTION RECTAL; TOPICAL at 05:02

## 2020-01-06 RX ADMIN — AER TRAVELER 1 APPLICATION(S): 0.5 SOLUTION RECTAL; TOPICAL at 21:13

## 2020-01-06 RX ADMIN — SODIUM CHLORIDE 250 MILLILITER(S): 9 INJECTION INTRAMUSCULAR; INTRAVENOUS; SUBCUTANEOUS at 14:19

## 2020-01-06 RX ADMIN — Medication 1 APPLICATION(S): at 09:10

## 2020-01-06 RX ADMIN — Medication 1 SUPPOSITORY(S): at 18:46

## 2020-01-06 RX ADMIN — Medication 650 MILLIGRAM(S): at 11:21

## 2020-01-06 RX ADMIN — LIDOCAINE 1 APPLICATION(S): 4 CREAM TOPICAL at 21:12

## 2020-01-06 RX ADMIN — Medication 1 SUPPOSITORY(S): at 05:01

## 2020-01-06 RX ADMIN — TIOTROPIUM BROMIDE 1 CAPSULE(S): 18 CAPSULE ORAL; RESPIRATORY (INHALATION) at 11:34

## 2020-01-06 RX ADMIN — Medication 650 MILLIGRAM(S): at 12:17

## 2020-01-06 NOTE — PROGRESS NOTE ADULT - SUBJECTIVE AND OBJECTIVE BOX
INTERVAL HPI/OVERNIGHT EVENTS:  pt seen and examined  denies vomiting/abd pain  states diarrhea and rectal pain better  tolerating po  passing small amounts of soft stool per nursing  no new labs    MEDICATIONS  (STANDING):  enoxaparin Injectable 40 milliGRAM(s) SubCutaneous daily  hydrocortisone 2.5% Rectal Cream 1 Application(s) Rectal two times a day  hydrocortisone hemorrhoidal Suppository 1 Suppository(s) Rectal two times a day  influenza   Vaccine 0.5 milliLiter(s) IntraMuscular once  lactobacillus acidophilus 1 Tablet(s) Oral three times a day with meals  lidocaine 2% Gel 1 Application(s) Topical three times a day  OXcarbazepine 300 milliGRAM(s) Oral two times a day  pantoprazole    Tablet 40 milliGRAM(s) Oral before breakfast  propranolol 20 milliGRAM(s) Oral three times a day  sucralfate 1 Gram(s) Oral two times a day  tiotropium 18 MICROgram(s) Capsule 1 Capsule(s) Inhalation daily  witch hazel Pads 1 Application(s) Topical three times a day    MEDICATIONS  (PRN):  acetaminophen   Tablet .. 650 milliGRAM(s) Oral every 6 hours PRN Temp greater or equal to 38C (100.4F), Mild Pain (1 - 3)  ALBUTerol    0.083% 2.5 milliGRAM(s) Nebulizer every 6 hours PRN Shortness of Breath and/or Wheezing  ketorolac   Injectable 15 milliGRAM(s) IV Push every 4 hours PRN Moderate Pain (4 - 6)  metoclopramide Injectable 5 milliGRAM(s) IV Push every 8 hours PRN nausea/ vomiting  ondansetron Injectable 4 milliGRAM(s) IV Push every 8 hours PRN Nausea and/or Vomiting  simethicone 80 milliGRAM(s) Chew two times a day PRN Upset Stomach      Allergies    IV Contrast (Anaphylaxis)  shellfish. (Anaphylaxis)    Intolerances        Review of Systems:    General:  No wt loss, fevers, chills, night sweats, fatigue   Eyes:  Good vision, no reported pain  ENT:  No sore throat, pain, runny nose, dysphagia  CV:  No pain, palpitations, hypo/hypertension  Resp:  No dyspnea, cough, tachypnea, wheezing  GI:  No pain, No nausea, No vomiting, No diarrhea, No constipation, No weight loss, No fever, No pruritis, No rectal bleeding, No melena, No dysphagia  :  No pain, bleeding, incontinence, nocturia  Muscle:  No pain, weakness  Neuro:  No weakness, tingling, memory problems  Psych:  No fatigue, insomnia, mood problems, depression  Endocrine:  No polyuria, polydypsia, cold/heat intolerance  Heme:  No petechiae, ecchymosis, easy bruisability  Skin:  No rash, tattoos, scars, edema      Vital Signs Last 24 Hrs  T(C): 36.9 (06 Jan 2020 08:15), Max: 37.4 (05 Jan 2020 23:56)  T(F): 98.5 (06 Jan 2020 08:15), Max: 99.3 (05 Jan 2020 23:56)  HR: 93 (06 Jan 2020 08:15) (83 - 115)  BP: 93/66 (06 Jan 2020 08:15) (93/66 - 141/89)  BP(mean): --  RR: 18 (06 Jan 2020 08:15) (18 - 18)  SpO2: 98% (06 Jan 2020 08:15) (94% - 99%)    PHYSICAL EXAM:    Constitutional: lying in bed  HEENT: ncat  Neck: No LAD  Gastrointestinal: soft nt nd  Extremities: No peripheral edema  Neurological: Awake alert responds appropriately      LABS:    01-05    137  |  98  |  17  ----------------------------<  97  4.2   |  34<H>  |  0.55    Ca    9.0      05 Jan 2020 08:01            RADIOLOGY & ADDITIONAL TESTS:

## 2020-01-06 NOTE — PROGRESS NOTE ADULT - PROBLEM SELECTOR PLAN 1
ct showing stercoral colitis c/b rectal pain in setting of hemorrhoids  prior gi notes reviewed, hx of ibs  some improvement  monitoring off  bowel regimen   cont lido and hydrocortisone cream   cont anusol supp bid, witch hazel pads, sitz baths   diet as tolerated  monitor exam/gi fxn  up to date w colonoscopy, 4/2019 showed only non bleeding internal hemorrhoids; given persistence of symptoms, offered repeat c-scope but pt defers  cont care as above, op gi f/u upon dc; will follow

## 2020-01-06 NOTE — PROGRESS NOTE ADULT - PROBLEM SELECTOR PLAN 3
CT abd/pelvis: Large amount of stool within the rectum with surrounding inflammatory change. s/p 3 day course of IV flagyl for Stercoral colitis. However this was discontinued as it appeared to be more of an IBD picture than infectious colitis.   -Bacid TID, continue PPI, carafate, senna at bedtime.  -GI, Dr. Hutchinson following  -will do abd xray to eval for stool pattern as patient is having persistent nausea and diarrhea which is keeping her from participating in PT.  -will give simethicone for upset stomach  -d/c metamucil, senna

## 2020-01-06 NOTE — PROGRESS NOTE ADULT - SUBJECTIVE AND OBJECTIVE BOX
Patient is a 59y old  Female who presents with a chief complaint of pneumothorax, colitis, UTI (06 Jan 2020 10:15)      FROM ADMISSION H+P:   HPI:  59F with PMH of interstitial lung disease hx of pneumothorax (on 2L NC at home), hx pulmonary nodules, Meniere's disease, Non-Hodgkin's lymphoma (SCD/XRT/chemo at [facility] 2003), hx of CVA (3/2019 - residual L sided weakness, unable to use her L arm), not on ASA or Plavix due to GIB, Seizure disorder, tachycardia, MVP, hx of chronic SDH, presents with weakness, weight loss, nausea, and diarrhea for the past month. Patient admits to 4 lb weight loss in 3 weeks, was seen by her PCP and started on Zofran one week ago. Patient reports going to wound care prior to admission for bed sores, and was advised ED evaluation. Per  at bedside, patient has been having loose BMs several times a day with foul smelling urine for 4 days. Denies fever. Patient also has felt too weak to get out of bed and started using a diaper to urinate/have BMs. Has mainly been wheelchair bound recently due to weakness, but at baseline patient is able to stand and walk short distances. Patient uses 2L NC at home and noticed her SpO2 at 75% on RA with ambulation. Patient was also switched to Oxcarbazepine 2-3 months ago and noticed her symptoms of anxiety, weakness, and "memory" have worsened.  Of note, at night when patient takes her Oxcarbazepine, she starts yelling and having "hallucinations."     In the ED: temp 97.6, HR 97, /80, RR 15, SpO2 92% RA, 98% on 3L NC. WBC 11.10. UA: moderate LEC, WBC 26-50, moderate bacteria. Chest x-ray: Interval enlargement of a right-sided pneumothorax. Small bilateral pleural effusions. Chronic lung fibrotic changes. CT chest/abd/pelvis: Moderate-sized right pneumothorax. Worsening groundglass opacities in both lower lobes, possibly infection. Stercoral colitis. Indeterminant hepatic lesions, possibly metastatic disease. Received IV Rocephin, IV Flagyl, 1L NS Bolus. EKG: NSR 88    Of note,  wants us to check oxcarbazepine levels. (26 Dec 2019 21:14)      ----  INTERVAL HPI/OVERNIGHT EVENTS: Pt seen and evaluated at the bedside. No acute overnight events occurred. Feeling much better day, less nausea, less diarrhea.  Denies chest pain, SOB.  States she is still feeling too sick to go home, and also refusing to go to [facility].    ----  PAST MEDICAL & SURGICAL HISTORY:  Interstitial lung disease: on home o2 prn  NHL (non-Hodgkin's lymphoma): Agem 43 sp chemo/rt/stem cell  Transient cerebral ischemia, unspecified type  Mitral prolapse  History of tonsillectomy  History of appendectomy      FAMILY HISTORY:  Family history of stroke  Family history of breast cancer: Mother      Allergies    IV Contrast (Anaphylaxis)  shellfish. (Anaphylaxis)    Intolerances        ----  REVIEW OF SYSTEMS:  CONSTITUTIONAL: denies fever, chills, fatigue, weakness  HEENT: denies blurred vision, sore throat  SKIN: denies new lesions, rash  CARDIOVASCULAR: denies chest pain, chest pressure, palpitations  RESPIRATORY: denies shortness of breath, sputum production  GASTROINTESTINAL: admits to nausea denies vomiting, diarrhea, abdominal pain  GENITOURINARY: denies dysuria, discharge  NEUROLOGICAL: denies numbness, headache, focal weakness  MUSCULOSKELETAL: denies new joint pain, muscle aches  HEMATOLOGIC: denies gross bleeding, bruising      ----  PHYSICAL EXAM:  GENERAL: patient appears well, no acute distress, appropriately interactive  EYES: sclera clear, no exudates  ENMT: oropharynx clear without erythema, moist mucous membranes  NECK: supple, soft, no thyromegaly noted  LUNGS: good air entry bilaterally, clear to auscultation, symmetric breath sounds, no wheezing or rhonchi appreciated  HEART: soft S1/S2, regular rate and rhythm, no murmurs noted, no noted edema to b/l LE  GASTROINTESTINAL: abdomen is soft, nontender, nondistended, normoactive bowel sounds, no palpable masses  INTEGUMENT: good skin turgor, appropriate for ethnicity, appears well perfused, no jaundice noted  MUSCULOSKELETAL: no clubbing or cyanosis, no obvious deformity  NEUROLOGIC: awake, alert, oriented x3, good muscle tone in 4 extremities, no obvious sensory deficits, weakness in LUE, contracted  PSYCHIATRIC: mood is good, affect is congruent with mood, linear and logical thought process  HEME/LYMPH: no palpable supraclavicular nodules, no obvious ecchymosis     T(C): 36.8 (01-06-20 @ 12:30), Max: 37.4 (01-05-20 @ 23:56)  HR: 82 (01-06-20 @ 13:47) (82 - 115)  BP: 91/60 (01-06-20 @ 13:47) (91/60 - 141/89)  RR: 18 (01-06-20 @ 12:30) (18 - 18)  SpO2: 100% (01-06-20 @ 12:30) (94% - 100%)  Wt(kg): --    ----  I&O's Summary    05 Jan 2020 07:01  -  06 Jan 2020 07:00  --------------------------------------------------------  IN: 320 mL / OUT: 0 mL / NET: 320 mL        LABS:    01-05    137  |  98  |  17  ----------------------------<  97  4.2   |  34<H>  |  0.55    Ca    9.0      05 Jan 2020 08:01          CAPILLARY BLOOD GLUCOSE                    ----  Personally reviewed:  Vital sign trends: [ x ] yes    [  ] no     [  ] n/a  Laboratory results: [ x ] yes    [  ] no     [  ] n/a  Radiology results: [  ] yes    [  ] no     [ x ] n/a  Culture results: [  ] yes    [  ] no     [ x ] n/a  Consultant recommendations: [ x ] yes    [  ] no     [  ] n/a

## 2020-01-06 NOTE — CHART NOTE - NSCHARTNOTEFT_GEN_A_CORE
Assessment: patient seen for malnutrition follow up. patient continues on regular diet with fairly good PO intake but many preferences noted. previous RD note  was sending order for ensure enlive for patient awaiting activation of diet.     Factors impacting intake: [ ] none [ ] nausea  [ ] vomiting [ ] diarrhea [ ] constipation  [ ]chewing problems [ ] swallowing issues  [x ] other: improved diarrhea    Diet Presciption: Diet, Regular (12-26-19 @ 22:54)    Intake: 75-85% of meals taken per flow sheet    Current Weight: wt 122.7#       Pertinent Medications: MEDICATIONS  (STANDING):  enoxaparin Injectable 40 milliGRAM(s) SubCutaneous daily  hydrocortisone 2.5% Rectal Cream 1 Application(s) Rectal two times a day  hydrocortisone hemorrhoidal Suppository 1 Suppository(s) Rectal two times a day  influenza   Vaccine 0.5 milliLiter(s) IntraMuscular once  lactobacillus acidophilus 1 Tablet(s) Oral three times a day with meals  lidocaine 2% Gel 1 Application(s) Topical three times a day  OXcarbazepine 300 milliGRAM(s) Oral two times a day  pantoprazole    Tablet 40 milliGRAM(s) Oral before breakfast  propranolol 20 milliGRAM(s) Oral three times a day  sodium chloride 0.9% Bolus 250 milliLiter(s) IV Bolus once  sucralfate 1 Gram(s) Oral two times a day  tiotropium 18 MICROgram(s) Capsule 1 Capsule(s) Inhalation daily  witch hazel Pads 1 Application(s) Topical three times a day    MEDICATIONS  (PRN):  acetaminophen   Tablet .. 650 milliGRAM(s) Oral every 6 hours PRN Temp greater or equal to 38C (100.4F), Mild Pain (1 - 3)  ALBUTerol    0.083% 2.5 milliGRAM(s) Nebulizer every 6 hours PRN Shortness of Breath and/or Wheezing  ketorolac   Injectable 15 milliGRAM(s) IV Push every 4 hours PRN Moderate Pain (4 - 6)  metoclopramide Injectable 5 milliGRAM(s) IV Push every 8 hours PRN nausea/ vomiting  ondansetron Injectable 4 milliGRAM(s) IV Push every 8 hours PRN Nausea and/or Vomiting  simethicone 80 milliGRAM(s) Chew two times a day PRN Upset Stomach    Pertinent Labs: 01-05 Na137 mmol/L Glu 97 mg/dL K+ 4.2 mmol/L Cr  0.55 mg/dL BUN 17 mg/dL    Skin: kay 16 sacral and gluteal crease stage 2    Estimated Needs:   [x ] no change since previous assessment  [ ] recalculated:     Previous Nutrition Diagnosis:   [ ] Inadequate Energy Intake [ ]Inadequate Oral Intake [ ] Excessive Energy Intake   [ ] Underweight [ ] Increased Nutrient Needs [ ] Overweight/Obesity   [ ] Altered GI Function [ ] Unintended Weight Loss [ ] Food & Nutrition Related Knowledge Deficit [x ] Malnutrition     Nutrition Diagnosis is [x ] ongoing  [ ] resolved [ ] not applicable     New Nutrition Diagnosis: [x ] not applicable       Interventions:   Recommend  [ ] Change Diet To:  [ ] Nutrition Supplement  [ ] Nutrition Support  [ x] Other: suggest add ensure enlive daily suggest add MVI and Vit c 500mg BID , will provide food preferences    Monitoring and Evaluation:   [x ] PO intake [ x ] Tolerance to diet prescription [ x ] weights [ x ] labs[ x ] follow up per protocol  [ ] other:

## 2020-01-06 NOTE — PROGRESS NOTE ADULT - ATTENDING COMMENTS
59F, hx interstitial lung dz/multiple pneumothoraces/2L home O2, hx pulm nodules, hx hepatic lesions, Meneire's dz, NHL/chemotherapy, hx CVA 3/2019/residual R weakness, hx seizures, hx GIB, hx possible IBS - mgmt for R pnthx assoc w/lung trapping - managed conservatively w/suppl O2, close monitoring w/serial CXRs as per Pulm recomms; +UTI, also stercolitis s/p IV abxs - w/increased c/f IBS - optimized - d/c planning underway  -pneumothorax - CXR showed decreased size - repeat CXR 1/1/20 unchanged; continued suppl O2 - pt on baseline level of 2L home oxygen   -UTI - completed course ceftriaxone  -hx stercolitis - s/p short course flagyl - per GI, less c/f infectious process - possible IBS/inflammatory process - on anusol suppositories; pt reports diarrhea - d/c'd bowel regimen of metamucil, senna tabs  -seizures - continue trileptal - pt to continue f/u o/p per Neuro for further eval c/f sleep d/o  -hx hepatic lesions - noted on CTAP - also present on previous CTAP 4/2019 - pt to continue o/p f/u as previously  -dvt prophy  -PT mobilization - recomm NEHA, but pt was refusing; family wants NEHA as feel unable to manage pt at home - pt now agreeable to NEHA - arrangements underway

## 2020-01-06 NOTE — PROGRESS NOTE ADULT - SUBJECTIVE AND OBJECTIVE BOX
Date/Time Patient Seen:  		  Referring MD:   Data Reviewed	       Patient is a 59y old  Female who presents with a chief complaint of pneumothorax, colitis, UTI (05 Jan 2020 18:39)      Subjective/HPI     PAST MEDICAL & SURGICAL HISTORY:  Interstitial lung disease: on home o2 prn  NHL (non-Hodgkin's lymphoma): Agem 43 sp chemo/rt/stem cell  Transient cerebral ischemia, unspecified type  Mitral prolapse  Pulmonary disease  History of tonsillectomy  History of appendectomy        Medication list         MEDICATIONS  (STANDING):  enoxaparin Injectable 40 milliGRAM(s) SubCutaneous daily  hydrocortisone 2.5% Rectal Cream 1 Application(s) Rectal two times a day  hydrocortisone hemorrhoidal Suppository 1 Suppository(s) Rectal two times a day  influenza   Vaccine 0.5 milliLiter(s) IntraMuscular once  lactobacillus acidophilus 1 Tablet(s) Oral three times a day with meals  lidocaine 2% Gel 1 Application(s) Topical three times a day  OXcarbazepine 300 milliGRAM(s) Oral two times a day  pantoprazole    Tablet 40 milliGRAM(s) Oral before breakfast  propranolol 20 milliGRAM(s) Oral three times a day  sucralfate 1 Gram(s) Oral two times a day  tiotropium 18 MICROgram(s) Capsule 1 Capsule(s) Inhalation daily  witch hazel Pads 1 Application(s) Topical three times a day    MEDICATIONS  (PRN):  acetaminophen   Tablet .. 650 milliGRAM(s) Oral every 6 hours PRN Temp greater or equal to 38C (100.4F), Mild Pain (1 - 3)  ALBUTerol    0.083% 2.5 milliGRAM(s) Nebulizer every 6 hours PRN Shortness of Breath and/or Wheezing  ketorolac   Injectable 15 milliGRAM(s) IV Push every 4 hours PRN Moderate Pain (4 - 6)  metoclopramide Injectable 5 milliGRAM(s) IV Push every 8 hours PRN nausea/ vomiting  ondansetron Injectable 4 milliGRAM(s) IV Push every 8 hours PRN Nausea and/or Vomiting  simethicone 80 milliGRAM(s) Chew two times a day PRN Upset Stomach         Vitals log        ICU Vital Signs Last 24 Hrs  T(C): 36.9 (06 Jan 2020 08:15), Max: 37.4 (05 Jan 2020 23:56)  T(F): 98.5 (06 Jan 2020 08:15), Max: 99.3 (05 Jan 2020 23:56)  HR: 93 (06 Jan 2020 08:15) (83 - 115)  BP: 93/66 (06 Jan 2020 08:15) (93/66 - 141/89)  BP(mean): --  ABP: --  ABP(mean): --  RR: 18 (06 Jan 2020 08:15) (18 - 18)  SpO2: 98% (06 Jan 2020 08:15) (94% - 99%)           Input and Output:  I&O's Detail    05 Jan 2020 07:01  -  06 Jan 2020 07:00  --------------------------------------------------------  IN:    Oral Fluid: 320 mL  Total IN: 320 mL    OUT:  Total OUT: 0 mL    Total NET: 320 mL          Lab Data    01-05    137  |  98  |  17  ----------------------------<  97  4.2   |  34<H>  |  0.55    Ca    9.0      05 Jan 2020 08:01              Review of Systems	      Objective     Physical Examination    head at  heart s1s2  lung dec BS      Pertinent Lab findings & Imaging      Shauna:  NO   Adequate UO     I&O's Detail    05 Jan 2020 07:01  -  06 Jan 2020 07:00  --------------------------------------------------------  IN:    Oral Fluid: 320 mL  Total IN: 320 mL    OUT:  Total OUT: 0 mL    Total NET: 320 mL               Discussed with:     Cultures:	        Radiology

## 2020-01-06 NOTE — PROGRESS NOTE ADULT - PROBLEM SELECTOR PLAN 1
CM notes reviewed - absolutely refuses - NEHA - dc planning  serial cxr noted and reviewed - no worsening - if anything improvement is noted - clinically pt is fine - without resp distress and or HD instability  no need for any more Imaging related to PTX  supportive care  o2 support  pt has o2 at home  pt has chronic lung disease - ILD  will follow up with Dr. Bettye Hazel in [facility] - for her Pulmonary Needs -   I jesus if able to cooperate  out of bed as tolerated  medical supportive care and regimen  discussed with patient.

## 2020-01-06 NOTE — PROGRESS NOTE ADULT - PROBLEM SELECTOR PLAN 2
multifactorial; improved  cont reglan prn as qtc allows  cont ppi and carafate   recent egd w gastritis

## 2020-01-06 NOTE — PROGRESS NOTE ADULT - PROBLEM SELECTOR PLAN 5
-Continue Propranolol TID with hold parameters  - 1/2/20 patient had episodes of nonsustained tachycardia to the 120s (propanolol was held for low BP in the AM and afternoon).  Patient was given a bolus of fluid and received her third dose in the PM.  No other events on tele -Continue Propranolol TID with hold parameters  - 1/2/20 patient had episodes of nonsustained tachycardia to the 120s (propanolol was held for low BP in the AM and afternoon).  Patient was given a bolus of fluid and received her third dose in the PM.  No other events on tele  - 1/6/20 patient had another episode of sbps in the 90s, patient was give 250ml bolus without improvement and another 500mL bolus.

## 2020-01-07 PROCEDURE — 99233 SBSQ HOSP IP/OBS HIGH 50: CPT | Mod: GC

## 2020-01-07 RX ORDER — LEVALBUTEROL 1.25 MG/.5ML
0.63 SOLUTION, CONCENTRATE RESPIRATORY (INHALATION) ONCE
Refills: 0 | Status: COMPLETED | OUTPATIENT
Start: 2020-01-07 | End: 2020-01-07

## 2020-01-07 RX ORDER — ONDANSETRON 8 MG/1
4 TABLET, FILM COATED ORAL EVERY 12 HOURS
Refills: 0 | Status: DISCONTINUED | OUTPATIENT
Start: 2020-01-07 | End: 2020-01-23

## 2020-01-07 RX ORDER — BENZOCAINE AND MENTHOL 5; 1 G/100ML; G/100ML
1 LIQUID ORAL ONCE
Refills: 0 | Status: COMPLETED | OUTPATIENT
Start: 2020-01-07 | End: 2020-01-07

## 2020-01-07 RX ADMIN — Medication 1 APPLICATION(S): at 11:03

## 2020-01-07 RX ADMIN — AER TRAVELER 1 APPLICATION(S): 0.5 SOLUTION RECTAL; TOPICAL at 06:38

## 2020-01-07 RX ADMIN — Medication 1 APPLICATION(S): at 22:08

## 2020-01-07 RX ADMIN — Medication 1 TABLET(S): at 09:10

## 2020-01-07 RX ADMIN — AER TRAVELER 1 APPLICATION(S): 0.5 SOLUTION RECTAL; TOPICAL at 22:53

## 2020-01-07 RX ADMIN — Medication 1 SUPPOSITORY(S): at 06:37

## 2020-01-07 RX ADMIN — Medication 650 MILLIGRAM(S): at 23:50

## 2020-01-07 RX ADMIN — PANTOPRAZOLE SODIUM 40 MILLIGRAM(S): 20 TABLET, DELAYED RELEASE ORAL at 06:37

## 2020-01-07 RX ADMIN — LIDOCAINE 1 APPLICATION(S): 4 CREAM TOPICAL at 06:38

## 2020-01-07 RX ADMIN — OXCARBAZEPINE 300 MILLIGRAM(S): 300 TABLET, FILM COATED ORAL at 09:10

## 2020-01-07 RX ADMIN — Medication 650 MILLIGRAM(S): at 15:59

## 2020-01-07 RX ADMIN — Medication 650 MILLIGRAM(S): at 05:00

## 2020-01-07 RX ADMIN — Medication 650 MILLIGRAM(S): at 22:54

## 2020-01-07 RX ADMIN — Medication 1 SUPPOSITORY(S): at 17:27

## 2020-01-07 RX ADMIN — Medication 650 MILLIGRAM(S): at 04:43

## 2020-01-07 RX ADMIN — BENZOCAINE AND MENTHOL 1 LOZENGE: 5; 1 LIQUID ORAL at 04:57

## 2020-01-07 RX ADMIN — LIDOCAINE 1 APPLICATION(S): 4 CREAM TOPICAL at 13:22

## 2020-01-07 RX ADMIN — AER TRAVELER 1 APPLICATION(S): 0.5 SOLUTION RECTAL; TOPICAL at 13:22

## 2020-01-07 RX ADMIN — OXCARBAZEPINE 300 MILLIGRAM(S): 300 TABLET, FILM COATED ORAL at 21:47

## 2020-01-07 RX ADMIN — LEVALBUTEROL 0.63 MILLIGRAM(S): 1.25 SOLUTION, CONCENTRATE RESPIRATORY (INHALATION) at 04:58

## 2020-01-07 RX ADMIN — ENOXAPARIN SODIUM 40 MILLIGRAM(S): 100 INJECTION SUBCUTANEOUS at 11:03

## 2020-01-07 RX ADMIN — Medication 1 GRAM(S): at 06:37

## 2020-01-07 RX ADMIN — TIOTROPIUM BROMIDE 1 CAPSULE(S): 18 CAPSULE ORAL; RESPIRATORY (INHALATION) at 11:03

## 2020-01-07 RX ADMIN — ONDANSETRON 4 MILLIGRAM(S): 8 TABLET, FILM COATED ORAL at 13:27

## 2020-01-07 RX ADMIN — Medication 650 MILLIGRAM(S): at 18:40

## 2020-01-07 RX ADMIN — LIDOCAINE 1 APPLICATION(S): 4 CREAM TOPICAL at 21:47

## 2020-01-07 NOTE — PROGRESS NOTE ADULT - PROBLEM SELECTOR PLAN 2
-Continue Propranolol TID with hold parameters  - 1/2/20 patient had episodes of nonsustained tachycardia to the 120s (propanolol was held for low BP in the AM and afternoon).  Patient was given a bolus of fluid and received her third dose in the PM.  No other events on tele  - 1/6/20 patient had another episode of sbps in the 90s, patient was give 250ml bolus without improvement and another 500mL bolus.

## 2020-01-07 NOTE — PROGRESS NOTE ADULT - SUBJECTIVE AND OBJECTIVE BOX
Neurology follow up note    ROMIE [id number]      Interval History:    Patient feels ok seen with daughter     MEDICATIONS    acetaminophen   Tablet .. 650 milliGRAM(s) Oral every 6 hours PRN  ALBUTerol    0.083% 2.5 milliGRAM(s) Nebulizer every 6 hours PRN  enoxaparin Injectable 40 milliGRAM(s) SubCutaneous daily  hydrocortisone 2.5% Rectal Cream 1 Application(s) Rectal two times a day  hydrocortisone hemorrhoidal Suppository 1 Suppository(s) Rectal two times a day  influenza   Vaccine 0.5 milliLiter(s) IntraMuscular once  ketorolac   Injectable 15 milliGRAM(s) IV Push every 4 hours PRN  lactobacillus acidophilus 1 Tablet(s) Oral three times a day with meals  lidocaine 2% Gel 1 Application(s) Topical three times a day  metoclopramide Injectable 5 milliGRAM(s) IV Push every 8 hours PRN  ondansetron Injectable 4 milliGRAM(s) IV Push every 8 hours PRN  OXcarbazepine 300 milliGRAM(s) Oral two times a day  pantoprazole    Tablet 40 milliGRAM(s) Oral before breakfast  propranolol 20 milliGRAM(s) Oral three times a day  simethicone 80 milliGRAM(s) Chew two times a day PRN  sucralfate 1 Gram(s) Oral two times a day  tiotropium 18 MICROgram(s) Capsule 1 Capsule(s) Inhalation daily  witch hazel Pads 1 Application(s) Topical three times a day      Allergies    IV Contrast (Anaphylaxis)  shellfish. (Anaphylaxis)    Intolerances            Vital Signs Last 24 Hrs  T(C): 36.7 (07 Jan 2020 12:38), Max: 37.6 (07 Jan 2020 00:05)  T(F): 98 (07 Jan 2020 12:38), Max: 99.6 (07 Jan 2020 00:05)  HR: 96 (07 Jan 2020 12:38) (76 - 118)  BP: 97/67 (07 Jan 2020 12:38) (91/60 - 149/83)  BP(mean): --  RR: 18 (07 Jan 2020 12:38) (18 - 19)  SpO2: 97% (07 Jan 2020 12:38) (92% - 100%)    REVIEW OF SYSTEMS:  Constitutional:  The patient denies fever, chills, or night sweats.  Head:  No headaches.  Eyes:  No double vision or blurry vision.  Ears:  No ringing in the ears.  Neck:  No neck pain.  Respiratory:  Occasional shortness of breath.  Cardiovascular:  Positive right-sided chest pain.  Abdomen:  occ nausea,  no vomiting, or abdominal pain.  Extremities/Neurological:  No numbness or tingling.  Musculoskeletal:  Occasional joint pain.    PHYSICAL EXAMINATION:   HEENT:  Head:  Normocephalic, atraumatic.  Eyes:  No scleral icterus.  Ears:  Hearing bilaterally appeared to be intact.  NECK:  Supple.  RESPIRATORY:  Decreased breath sounds bilaterally, but most prominent on the right.  CARDIOVASCULAR:  S1 and S2 heard.  ABDOMEN:  Soft and nontender.  Extremities:  No clubbing or cyanosis were noted.      NEUROLOGIC:  The patient is awake and alert.  Location was [facility], year was 2019, month was December.  Was able to name objects.  Extraocular movements were intact.  Pupils were equal, round, and reactive bilaterally 3 mm to 2 mm.  Speech was fluent.  Smile was symmetric.  Motor:  Right upper was 5/5, left upper was 3/5, right lower was 4/5, left lower was 3+/5.  As per my conversation with the spouse, after her apparent event back in March, questionable seizure versus questionable stroke.  She was left with left-sided weakness.  Sensory:  Bilateral upper and lower appeared intact to light touch.                    LABS:            Hemoglobin A1C:       Vitamin B12         RADIOLOGY    ANALYSIS AND PLAN:  This is a 59-year-old with a history of possibly epilepsy verse TIAs, history of chronic subdural hydromas and change in mental status.    1.	For episode of change in mental status, as per my conversation with the spouse, these appear to occur primarily at the same time at night for the last three to four weeks.  The patient has been on Trileptal for about eight to nine months.  Suspect less likely this is Trileptal, but I will check Trileptal levels.  Questionable, the patient could have any type of sleep-related disorder causing these events to occur at night.  I spoke with the spouse, the patient should undergo sleep studies.  2.	For history of chronic subdural hematoma, these appeared to have resolved from previous MRI.  3.	For history of possible underlying epilepsy, for now, I will continue the patient on her Trileptal.  4.	For episode of left-sided hemipareses of unclear etiology, as per my conversation with the spouse, there was a question that this was from a seizure event versus possibly a TIA even though MRI did not show cerebrovascular accident.  Unclear, the patient has any type of underlying neurodegenerative disease that could be explaining this.  The patient was seen at [facility] and does follow up with an outside neurologist.  5.	Monitor CO2 levels as needed  6.	spoke outside neurologist Dr. Wallace in past agrees to continue trileptal for now  His telephone number is [phone].  7.	Spouse's name is Marialuisa, his telephone number is [phone]   8.	appears more interactive   9.	trileptal levels ok   10.	no new events   11.	neurologic wise stable   12.	Greater than 22 minutes of time was spent with the patient, plan of care, reviewing data, speaking to the family and multidisciplinary healthcare team

## 2020-01-07 NOTE — PROGRESS NOTE ADULT - PROBLEM SELECTOR PLAN 4
CT abd/pelvis: Large amount of stool within the rectum with surrounding inflammatory change. s/p 3 day course of IV flagyl for Stercoral colitis. However this was discontinued as it appeared to be more of an IBD picture than infectious colitis.   -Bacid TID, continue PPI, carafate, senna at bedtime.  -GI, Dr. Hutchinson following  -will do abd xray to eval for stool pattern as patient is having persistent nausea and diarrhea which is keeping her from participating in PT.  -will give simethicone for upset stomach  -d/c metamucil, senna  -patient declined colonoscopy

## 2020-01-07 NOTE — PROGRESS NOTE ADULT - SUBJECTIVE AND OBJECTIVE BOX
INTERVAL HPI/OVERNIGHT EVENTS:  pt seen and examined  state n/d/rectal pain better  denies abd pain, vomiting  c/o cough  no new labs    MEDICATIONS  (STANDING):  enoxaparin Injectable 40 milliGRAM(s) SubCutaneous daily  hydrocortisone 2.5% Rectal Cream 1 Application(s) Rectal two times a day  hydrocortisone hemorrhoidal Suppository 1 Suppository(s) Rectal two times a day  influenza   Vaccine 0.5 milliLiter(s) IntraMuscular once  lactobacillus acidophilus 1 Tablet(s) Oral three times a day with meals  lidocaine 2% Gel 1 Application(s) Topical three times a day  OXcarbazepine 300 milliGRAM(s) Oral two times a day  pantoprazole    Tablet 40 milliGRAM(s) Oral before breakfast  propranolol 20 milliGRAM(s) Oral three times a day  sucralfate 1 Gram(s) Oral two times a day  tiotropium 18 MICROgram(s) Capsule 1 Capsule(s) Inhalation daily  witch hazel Pads 1 Application(s) Topical three times a day    MEDICATIONS  (PRN):  acetaminophen   Tablet .. 650 milliGRAM(s) Oral every 6 hours PRN Temp greater or equal to 38C (100.4F), Mild Pain (1 - 3)  ALBUTerol    0.083% 2.5 milliGRAM(s) Nebulizer every 6 hours PRN Shortness of Breath and/or Wheezing  ketorolac   Injectable 15 milliGRAM(s) IV Push every 4 hours PRN Moderate Pain (4 - 6)  metoclopramide Injectable 5 milliGRAM(s) IV Push every 8 hours PRN nausea/ vomiting  ondansetron Injectable 4 milliGRAM(s) IV Push every 8 hours PRN Nausea and/or Vomiting  simethicone 80 milliGRAM(s) Chew two times a day PRN Upset Stomach      Allergies    IV Contrast (Anaphylaxis)  shellfish. (Anaphylaxis)    Intolerances        Review of Systems:    General:  No wt loss, fevers, chills, night sweats, fatigue   Eyes:  Good vision, no reported pain  ENT:  No sore throat, pain, runny nose, dysphagia  CV:  No pain, palpitations, hypo/hypertension  Resp: cough  GI:  No pain, No nausea, No vomiting, No diarrhea, No constipation, No weight loss, No fever, No pruritis, No rectal bleeding, No melena, No dysphagia  :  No pain, bleeding, incontinence, nocturia  Muscle:  No pain, weakness  Neuro:  No weakness, tingling, memory problems  Psych:  No fatigue, insomnia, mood problems, depression  Endocrine:  No polyuria, polydypsia, cold/heat intolerance  Heme:  No petechiae, ecchymosis, easy bruisability  Skin:  No rash, tattoos, scars, edema      Vital Signs Last 24 Hrs  T(C): 36.4 (07 Jan 2020 07:53), Max: 37.6 (07 Jan 2020 00:05)  T(F): 97.6 (07 Jan 2020 07:53), Max: 99.6 (07 Jan 2020 00:05)  HR: 76 (07 Jan 2020 07:53) (76 - 118)  BP: 94/67 (07 Jan 2020 07:53) (91/60 - 149/83)  BP(mean): --  RR: 18 (07 Jan 2020 07:53) (18 - 19)  SpO2: 94% (07 Jan 2020 07:53) (92% - 100%)    PHYSICAL EXAM:    Constitutional: lying in bed  HEENT: ncat  Neck: No LAD  Gastrointestinal: soft nt nd  Extremities: No peripheral edema  Neurological: Awake alert responds appropriately    LABS:                RADIOLOGY & ADDITIONAL TESTS:

## 2020-01-07 NOTE — PROGRESS NOTE ADULT - SUBJECTIVE AND OBJECTIVE BOX
Patient is a 59y old  Female who presents with a chief complaint of pneumothorax, colitis, UTI (07 Jan 2020 12:47)      FROM ADMISSION H+P:   HPI:  59F with PMH of interstitial lung disease hx of pneumothorax (on 2L NC at home), hx pulmonary nodules, Meniere's disease, Non-Hodgkin's lymphoma (SCD/XRT/chemo at [facility] 2003), hx of CVA (3/2019 - residual L sided weakness, unable to use her L arm), not on ASA or Plavix due to GIB, Seizure disorder, tachycardia, MVP, hx of chronic SDH, presents with weakness, weight loss, nausea, and diarrhea for the past month. Patient admits to 4 lb weight loss in 3 weeks, was seen by her PCP and started on Zofran one week ago. Patient reports going to wound care prior to admission for bed sores, and was advised ED evaluation. Per  at bedside, patient has been having loose BMs several times a day with foul smelling urine for 4 days. Denies fever. Patient also has felt too weak to get out of bed and started using a diaper to urinate/have BMs. Has mainly been wheelchair bound recently due to weakness, but at baseline patient is able to stand and walk short distances. Patient uses 2L NC at home and noticed her SpO2 at 75% on RA with ambulation. Patient was also switched to Oxcarbazepine 2-3 months ago and noticed her symptoms of anxiety, weakness, and "memory" have worsened.  Of note, at night when patient takes her Oxcarbazepine, she starts yelling and having "hallucinations."     In the ED: temp 97.6, HR 97, /80, RR 15, SpO2 92% RA, 98% on 3L NC. WBC 11.10. UA: moderate LEC, WBC 26-50, moderate bacteria. Chest x-ray: Interval enlargement of a right-sided pneumothorax. Small bilateral pleural effusions. Chronic lung fibrotic changes. CT chest/abd/pelvis: Moderate-sized right pneumothorax. Worsening groundglass opacities in both lower lobes, possibly infection. Stercoral colitis. Indeterminant hepatic lesions, possibly metastatic disease. Received IV Rocephin, IV Flagyl, 1L NS Bolus. EKG: NSR 88    Of note,  wants us to check oxcarbazepine levels. (26 Dec 2019 21:14)      ----  INTERVAL HPI/OVERNIGHT EVENTS: Pt seen and evaluated at the bedside. No acute overnight events occurred. Patient had a few episodes of asymptomatic SBPs in the 90s. Patient was given a bolus of fluid and encouraged to take PO hydration during the day.  Patient's propanolol was held and thus had a couple episodes of tachycardia overnight.  Today, Patient reports feeling much better, denies any nausea, vomiting, chest pain SOB.  States her diarrhea is resolved.  Not complaining of vaginal or rectal pain.     ----  PAST MEDICAL & SURGICAL HISTORY:  Interstitial lung disease: on home o2 prn  NHL (non-Hodgkin's lymphoma): Agem 43 sp chemo/rt/stem cell  Transient cerebral ischemia, unspecified type  Mitral prolapse  History of tonsillectomy  History of appendectomy      FAMILY HISTORY:  Family history of stroke  Family history of breast cancer: Mother      Allergies    IV Contrast (Anaphylaxis)  shellfish. (Anaphylaxis)    Intolerances        ----  REVIEW OF SYSTEMS:  CONSTITUTIONAL: denies fever, chills, fatigue, weakness  HEENT: denies blurred vision, sore throat  SKIN: denies new lesions, rash  CARDIOVASCULAR: denies chest pain, chest pressure, palpitations  RESPIRATORY: denies shortness of breath, sputum production  GASTROINTESTINAL: denies nausea, vomiting, diarrhea, abdominal pain  GENITOURINARY: denies dysuria, discharge  NEUROLOGICAL: denies numbness, headache, focal weakness  MUSCULOSKELETAL: denies new joint pain, muscle aches  HEMATOLOGIC: denies gross bleeding, bruising    ----  PHYSICAL EXAM:  GENERAL: patient appears well, no acute distress,   EYES: sclera clear, no exudates  ENMT: oropharynx clear without erythema, moist mucous membranes  NECK: supple, soft, no thyromegaly noted  LUNGS: good air entry bilaterally, clear to auscultation, symmetric breath sounds, no wheezing or rhonchi appreciated  HEART: soft S1/S2, regular rate and rhythm, no murmurs noted, no noted edema to b/l LE  GASTROINTESTINAL: abdomen is soft, nontender, nondistended, normoactive bowel sounds, no palpable masses  INTEGUMENT: good skin turgor, appropriate for ethnicity, appears well perfused, no jaundice noted  MUSCULOSKELETAL: no clubbing or cyanosis, no obvious deformity  NEUROLOGIC: awake, alert, oriented x3, good muscle tone in 3 extremities, no obvious sensory deficits, LUE weakness mild increase in flexors  HEME/LYMPH: no palpable supraclavicular nodules, no obvious ecchymosis     T(C): 36.7 (01-07-20 @ 12:38), Max: 37.6 (01-07-20 @ 00:05)  HR: 96 (01-07-20 @ 12:57) (76 - 118)  BP: 97/67 (01-07-20 @ 12:57) (91/60 - 149/83)  RR: 18 (01-07-20 @ 12:38) (18 - 19)  SpO2: 97% (01-07-20 @ 12:57) (92% - 100%)  Wt(kg): --    ----  I&O's Summary    06 Jan 2020 07:01  -  07 Jan 2020 07:00  --------------------------------------------------------  IN: 250 mL / OUT: 160 mL / NET: 90 mL        LABS:              CAPILLARY BLOOD GLUCOSE                    ----  Personally reviewed:  Vital sign trends: [  ] yes    [  ] no     [  ] n/a  Laboratory results: [  ] yes    [  ] no     [  ] n/a  Radiology results: [  ] yes    [  ] no     [  ] n/a  Culture results: [  ] yes    [  ] no     [  ] n/a  Consultant recommendations: [  ] yes    [  ] no     [  ] n/a

## 2020-01-07 NOTE — PROGRESS NOTE ADULT - PROBLEM SELECTOR PLAN 1
moderate right pneumothorax,   -following serial xray now unchanged  -continue to encourage incentive spirometry use  -patient has h/o ILD (pt. occasionally uses this at home) will attempt to wean to room air as tolerated.  saturating 93-99% on 2L nasal canula  -out of bed as tolerated  -Cardiothoracic surgery, Dr. France consulted -daily CXR to monitor progress of PTX were done, now stabilized  -Pulmonolgy, Dr. Rashid consulted - continue Spiriva and duonebs moderate right pneumothorax  -following serial xray now unchanged  -continue to encourage incentive spirometry use  -patient has h/o ILD (pt. occasionally uses this at home) will attempt to wean to room air as tolerated.  saturating 93-99% on 2L nasal canula  -out of bed as tolerated  -Cardiothoracic surgery, Dr. France consulted -daily CXR to monitor progress of PTX were done, now stabilized  -Pulmonology, Dr. Rashid consulted - continue Spiriva and duonebs  -PT/OT recs --> NEHA

## 2020-01-07 NOTE — PROGRESS NOTE ADULT - PROBLEM SELECTOR PLAN 1
CM notes reviewed - considering NEHA - DTR involved in decision making -   serial cxr noted and reviewed - no worsening - if anything improvement is noted - clinically pt is fine - without resp distress and or HD instability  no need for any more Imaging related to PTX  supportive care  o2 support  pt has o2 at home  pt has chronic lung disease - ILD  will follow up with Dr. Bettye Hazel in [facility] - for her Pulmonary Needs -   I jesus if able to cooperate  out of bed as tolerated  medical supportive care and regimen  discussed with patient.

## 2020-01-07 NOTE — PROGRESS NOTE ADULT - SUBJECTIVE AND OBJECTIVE BOX
Date/Time Patient Seen:  		  Referring MD:   Data Reviewed	       Patient is a 59y old  Female who presents with a chief complaint of pneumothorax, colitis, UTI (06 Jan 2020 14:39)      Subjective/HPI     PAST MEDICAL & SURGICAL HISTORY:  Interstitial lung disease: on home o2 prn  NHL (non-Hodgkin's lymphoma): Agem 43 sp chemo/rt/stem cell  Transient cerebral ischemia, unspecified type  Mitral prolapse  Pulmonary disease  History of tonsillectomy  History of appendectomy        Medication list         MEDICATIONS  (STANDING):  enoxaparin Injectable 40 milliGRAM(s) SubCutaneous daily  hydrocortisone 2.5% Rectal Cream 1 Application(s) Rectal two times a day  hydrocortisone hemorrhoidal Suppository 1 Suppository(s) Rectal two times a day  influenza   Vaccine 0.5 milliLiter(s) IntraMuscular once  lactobacillus acidophilus 1 Tablet(s) Oral three times a day with meals  lidocaine 2% Gel 1 Application(s) Topical three times a day  OXcarbazepine 300 milliGRAM(s) Oral two times a day  pantoprazole    Tablet 40 milliGRAM(s) Oral before breakfast  propranolol 20 milliGRAM(s) Oral three times a day  sucralfate 1 Gram(s) Oral two times a day  tiotropium 18 MICROgram(s) Capsule 1 Capsule(s) Inhalation daily  witch hazel Pads 1 Application(s) Topical three times a day    MEDICATIONS  (PRN):  acetaminophen   Tablet .. 650 milliGRAM(s) Oral every 6 hours PRN Temp greater or equal to 38C (100.4F), Mild Pain (1 - 3)  ALBUTerol    0.083% 2.5 milliGRAM(s) Nebulizer every 6 hours PRN Shortness of Breath and/or Wheezing  ketorolac   Injectable 15 milliGRAM(s) IV Push every 4 hours PRN Moderate Pain (4 - 6)  metoclopramide Injectable 5 milliGRAM(s) IV Push every 8 hours PRN nausea/ vomiting  ondansetron Injectable 4 milliGRAM(s) IV Push every 8 hours PRN Nausea and/or Vomiting  simethicone 80 milliGRAM(s) Chew two times a day PRN Upset Stomach         Vitals log        ICU Vital Signs Last 24 Hrs  T(C): 37.4 (07 Jan 2020 04:35), Max: 37.6 (07 Jan 2020 00:05)  T(F): 99.3 (07 Jan 2020 04:35), Max: 99.6 (07 Jan 2020 00:05)  HR: 115 (07 Jan 2020 05:00) (82 - 118)  BP: 146/90 (07 Jan 2020 04:35) (91/60 - 149/83)  BP(mean): --  ABP: --  ABP(mean): --  RR: 19 (07 Jan 2020 04:35) (18 - 19)  SpO2: 98% (07 Jan 2020 05:00) (92% - 100%)           Input and Output:  I&O's Detail    06 Jan 2020 07:01  -  07 Jan 2020 07:00  --------------------------------------------------------  IN:    Sodium Chloride 0.9% IV Bolus: 250 mL  Total IN: 250 mL    OUT:    Voided: 160 mL  Total OUT: 160 mL    Total NET: 90 mL          Lab Data    01-05    137  |  98  |  17  ----------------------------<  97  4.2   |  34<H>  |  0.55    Ca    9.0      05 Jan 2020 08:01              Review of Systems	      Objective     Physical Examination    heart s1s2  lung dec BS  abd soft      Pertinent Lab findings & Imaging      Shauna:  NO   Adequate UO     I&O's Detail    06 Jan 2020 07:01  -  07 Jan 2020 07:00  --------------------------------------------------------  IN:    Sodium Chloride 0.9% IV Bolus: 250 mL  Total IN: 250 mL    OUT:    Voided: 160 mL  Total OUT: 160 mL    Total NET: 90 mL               Discussed with:     Cultures:	        Radiology

## 2020-01-07 NOTE — PROGRESS NOTE ADULT - PROBLEM SELECTOR PLAN 1
ct showing stercoral colitis c/b rectal pain in setting of hemorrhoids  prior gi notes reviewed, hx of ibs  symptoms improving  monitoring off  bowel regimen   cont lido and hydrocortisone cream   cont anusol supp bid, witch hazel pads, sitz baths   diet as tolerated  monitor exam/gi fxn  up to date w colonoscopy, 4/2019 showed only non bleeding internal hemorrhoids; given persistence of symptoms, offered repeat c-scope but pt defers  cont care as above, op gi f/u upon dc; no gi objection to dc planning

## 2020-01-08 DIAGNOSIS — R50.9 FEVER, UNSPECIFIED: ICD-10-CM

## 2020-01-08 LAB
ANION GAP SERPL CALC-SCNC: 5 MMOL/L — SIGNIFICANT CHANGE UP (ref 5–17)
BASOPHILS # BLD AUTO: 0.03 K/UL — SIGNIFICANT CHANGE UP (ref 0–0.2)
BASOPHILS NFR BLD AUTO: 0.3 % — SIGNIFICANT CHANGE UP (ref 0–2)
BUN SERPL-MCNC: 15 MG/DL — SIGNIFICANT CHANGE UP (ref 7–23)
CALCIUM SERPL-MCNC: 8.9 MG/DL — SIGNIFICANT CHANGE UP (ref 8.5–10.1)
CHLORIDE SERPL-SCNC: 95 MMOL/L — LOW (ref 96–108)
CO2 SERPL-SCNC: 34 MMOL/L — HIGH (ref 22–31)
CREAT SERPL-MCNC: 0.49 MG/DL — LOW (ref 0.5–1.3)
EOSINOPHIL # BLD AUTO: 0.02 K/UL — SIGNIFICANT CHANGE UP (ref 0–0.5)
EOSINOPHIL NFR BLD AUTO: 0.2 % — SIGNIFICANT CHANGE UP (ref 0–6)
FLU A RESULT: SIGNIFICANT CHANGE UP
FLU A RESULT: SIGNIFICANT CHANGE UP
FLUAV AG NPH QL: SIGNIFICANT CHANGE UP
FLUBV AG NPH QL: SIGNIFICANT CHANGE UP
GLUCOSE SERPL-MCNC: 132 MG/DL — HIGH (ref 70–99)
HCT VFR BLD CALC: 35.7 % — SIGNIFICANT CHANGE UP (ref 34.5–45)
HGB BLD-MCNC: 11.5 G/DL — SIGNIFICANT CHANGE UP (ref 11.5–15.5)
IMM GRANULOCYTES NFR BLD AUTO: 0.5 % — SIGNIFICANT CHANGE UP (ref 0–1.5)
LACTATE SERPL-SCNC: 1.8 MMOL/L — SIGNIFICANT CHANGE UP (ref 0.7–2)
LYMPHOCYTES # BLD AUTO: 0.57 K/UL — LOW (ref 1–3.3)
LYMPHOCYTES # BLD AUTO: 5.1 % — LOW (ref 13–44)
MCHC RBC-ENTMCNC: 28.3 PG — SIGNIFICANT CHANGE UP (ref 27–34)
MCHC RBC-ENTMCNC: 32.2 GM/DL — SIGNIFICANT CHANGE UP (ref 32–36)
MCV RBC AUTO: 87.7 FL — SIGNIFICANT CHANGE UP (ref 80–100)
MONOCYTES # BLD AUTO: 0.67 K/UL — SIGNIFICANT CHANGE UP (ref 0–0.9)
MONOCYTES NFR BLD AUTO: 6.1 % — SIGNIFICANT CHANGE UP (ref 2–14)
NEUTROPHILS # BLD AUTO: 9.73 K/UL — HIGH (ref 1.8–7.4)
NEUTROPHILS NFR BLD AUTO: 87.8 % — HIGH (ref 43–77)
NRBC # BLD: 0 /100 WBCS — SIGNIFICANT CHANGE UP (ref 0–0)
PLATELET # BLD AUTO: 542 K/UL — HIGH (ref 150–400)
POTASSIUM SERPL-MCNC: 4.1 MMOL/L — SIGNIFICANT CHANGE UP (ref 3.5–5.3)
POTASSIUM SERPL-SCNC: 4.1 MMOL/L — SIGNIFICANT CHANGE UP (ref 3.5–5.3)
RAPID RVP RESULT: SIGNIFICANT CHANGE UP
RBC # BLD: 4.07 M/UL — SIGNIFICANT CHANGE UP (ref 3.8–5.2)
RBC # FLD: 13.8 % — SIGNIFICANT CHANGE UP (ref 10.3–14.5)
RSV RESULT: SIGNIFICANT CHANGE UP
RSV RNA RESP QL NAA+PROBE: SIGNIFICANT CHANGE UP
SODIUM SERPL-SCNC: 134 MMOL/L — LOW (ref 135–145)
WBC # BLD: 11.07 K/UL — HIGH (ref 3.8–10.5)
WBC # FLD AUTO: 11.07 K/UL — HIGH (ref 3.8–10.5)

## 2020-01-08 PROCEDURE — 93010 ELECTROCARDIOGRAM REPORT: CPT | Mod: 77

## 2020-01-08 PROCEDURE — 71250 CT THORAX DX C-: CPT | Mod: 26

## 2020-01-08 PROCEDURE — 71045 X-RAY EXAM CHEST 1 VIEW: CPT | Mod: 26

## 2020-01-08 PROCEDURE — 99233 SBSQ HOSP IP/OBS HIGH 50: CPT | Mod: GC

## 2020-01-08 PROCEDURE — 74176 CT ABD & PELVIS W/O CONTRAST: CPT | Mod: 26

## 2020-01-08 PROCEDURE — 71045 X-RAY EXAM CHEST 1 VIEW: CPT | Mod: 26,77

## 2020-01-08 RX ORDER — IOHEXOL 300 MG/ML
500 INJECTION, SOLUTION INTRAVENOUS
Refills: 0 | Status: COMPLETED | OUTPATIENT
Start: 2020-01-08 | End: 2020-01-08

## 2020-01-08 RX ORDER — PIPERACILLIN AND TAZOBACTAM 4; .5 G/20ML; G/20ML
3.38 INJECTION, POWDER, LYOPHILIZED, FOR SOLUTION INTRAVENOUS ONCE
Refills: 0 | Status: COMPLETED | OUTPATIENT
Start: 2020-01-08 | End: 2020-01-08

## 2020-01-08 RX ORDER — ZINC OXIDE 200 MG/G
1 OINTMENT TOPICAL
Refills: 0 | Status: DISCONTINUED | OUTPATIENT
Start: 2020-01-08 | End: 2020-01-24

## 2020-01-08 RX ORDER — SODIUM CHLORIDE 9 MG/ML
1000 INJECTION INTRAMUSCULAR; INTRAVENOUS; SUBCUTANEOUS ONCE
Refills: 0 | Status: COMPLETED | OUTPATIENT
Start: 2020-01-08 | End: 2020-01-08

## 2020-01-08 RX ORDER — SODIUM CHLORIDE 9 MG/ML
1000 INJECTION, SOLUTION INTRAVENOUS
Refills: 0 | Status: DISCONTINUED | OUTPATIENT
Start: 2020-01-08 | End: 2020-01-11

## 2020-01-08 RX ORDER — ONDANSETRON 8 MG/1
4 TABLET, FILM COATED ORAL ONCE
Refills: 0 | Status: DISCONTINUED | OUTPATIENT
Start: 2020-01-08 | End: 2020-01-09

## 2020-01-08 RX ORDER — ALPRAZOLAM 0.25 MG
0.25 TABLET ORAL ONCE
Refills: 0 | Status: DISCONTINUED | OUTPATIENT
Start: 2020-01-08 | End: 2020-01-08

## 2020-01-08 RX ORDER — PIPERACILLIN AND TAZOBACTAM 4; .5 G/20ML; G/20ML
3.38 INJECTION, POWDER, LYOPHILIZED, FOR SOLUTION INTRAVENOUS EVERY 8 HOURS
Refills: 0 | Status: DISCONTINUED | OUTPATIENT
Start: 2020-01-08 | End: 2020-01-14

## 2020-01-08 RX ADMIN — PIPERACILLIN AND TAZOBACTAM 200 GRAM(S): 4; .5 INJECTION, POWDER, LYOPHILIZED, FOR SOLUTION INTRAVENOUS at 09:36

## 2020-01-08 RX ADMIN — PIPERACILLIN AND TAZOBACTAM 25 GRAM(S): 4; .5 INJECTION, POWDER, LYOPHILIZED, FOR SOLUTION INTRAVENOUS at 21:29

## 2020-01-08 RX ADMIN — Medication 1 SUPPOSITORY(S): at 18:09

## 2020-01-08 RX ADMIN — OXCARBAZEPINE 300 MILLIGRAM(S): 300 TABLET, FILM COATED ORAL at 09:35

## 2020-01-08 RX ADMIN — AER TRAVELER 1 APPLICATION(S): 0.5 SOLUTION RECTAL; TOPICAL at 05:02

## 2020-01-08 RX ADMIN — LIDOCAINE 1 APPLICATION(S): 4 CREAM TOPICAL at 14:32

## 2020-01-08 RX ADMIN — Medication 1 TABLET(S): at 12:07

## 2020-01-08 RX ADMIN — SODIUM CHLORIDE 80 MILLILITER(S): 9 INJECTION, SOLUTION INTRAVENOUS at 20:45

## 2020-01-08 RX ADMIN — LIDOCAINE 1 APPLICATION(S): 4 CREAM TOPICAL at 05:01

## 2020-01-08 RX ADMIN — PIPERACILLIN AND TAZOBACTAM 25 GRAM(S): 4; .5 INJECTION, POWDER, LYOPHILIZED, FOR SOLUTION INTRAVENOUS at 15:12

## 2020-01-08 RX ADMIN — Medication 650 MILLIGRAM(S): at 10:05

## 2020-01-08 RX ADMIN — Medication 650 MILLIGRAM(S): at 21:45

## 2020-01-08 RX ADMIN — TIOTROPIUM BROMIDE 1 CAPSULE(S): 18 CAPSULE ORAL; RESPIRATORY (INHALATION) at 12:07

## 2020-01-08 RX ADMIN — PANTOPRAZOLE SODIUM 40 MILLIGRAM(S): 20 TABLET, DELAYED RELEASE ORAL at 05:00

## 2020-01-08 RX ADMIN — LIDOCAINE 1 APPLICATION(S): 4 CREAM TOPICAL at 21:29

## 2020-01-08 RX ADMIN — Medication 1 GRAM(S): at 18:09

## 2020-01-08 RX ADMIN — Medication 650 MILLIGRAM(S): at 09:35

## 2020-01-08 RX ADMIN — Medication 1 APPLICATION(S): at 10:20

## 2020-01-08 RX ADMIN — ENOXAPARIN SODIUM 40 MILLIGRAM(S): 100 INJECTION SUBCUTANEOUS at 12:07

## 2020-01-08 RX ADMIN — Medication 1 TABLET(S): at 09:35

## 2020-01-08 RX ADMIN — Medication 1 SUPPOSITORY(S): at 05:03

## 2020-01-08 RX ADMIN — Medication 1 TABLET(S): at 16:23

## 2020-01-08 RX ADMIN — AER TRAVELER 1 APPLICATION(S): 0.5 SOLUTION RECTAL; TOPICAL at 14:33

## 2020-01-08 RX ADMIN — OXCARBAZEPINE 300 MILLIGRAM(S): 300 TABLET, FILM COATED ORAL at 21:29

## 2020-01-08 RX ADMIN — SODIUM CHLORIDE 1000 MILLILITER(S): 9 INJECTION INTRAMUSCULAR; INTRAVENOUS; SUBCUTANEOUS at 14:35

## 2020-01-08 RX ADMIN — Medication 1 GRAM(S): at 05:00

## 2020-01-08 RX ADMIN — Medication 650 MILLIGRAM(S): at 21:14

## 2020-01-08 NOTE — PROGRESS NOTE ADULT - PROBLEM SELECTOR PLAN 6
patient reports chronic nausea for the past month. Had an EGD/Colonoscopy this year that was "negative" per patient  -Zofran PO PRN for nausea, pt refuses Reglan as long as QT permits as per GI recs, will continue with Zofran, patient with chronic nausea  -Nutrition recommended diet  -GI, Dr. De La Vega, following

## 2020-01-08 NOTE — PROVIDER CONTACT NOTE (OTHER) - ASSESSMENT
Patient is extremely anxious crying and agitated, verbalized she wants to leave, patient is not consolable at this time despite multiple attempts by staff. Denies she is in pain, MD at bedside, called  for patient to speak to him.
Pt reassessed after bolus administration, BP now is 94/58 and pt is lethargic
pt insisting on sitting on the bedpan frequently to attempt a BM, small loose BM's
vs signs obtained stable.

## 2020-01-08 NOTE — CHART NOTE - NSCHARTNOTEFT_GEN_A_CORE
Resident Rapid Response Note    Patient is a 59y old  Female who presents with a chief complaint of pneumothorax, colitis, UTI (08 Jan 2020 17:07)      Rapid response was called at 19:01 on this 59y Female patient for sob and desaturation    Patient was seen and examined at the bedside by the rapid response team. ICU PA at bedside. Complaining of sob that worsened just now. Desatted to 85% on NC. No other acute complaints    Rapid Response Vital Signs:  BP:  HR:  RR:  SpO2: % on   Temp:  FS:    Vital Signs Last 24 Hrs  T(C): 36.8 (08 Jan 2020 16:28), Max: 39.4 (08 Jan 2020 08:33)  T(F): 98.3 (08 Jan 2020 16:28), Max: 103 (08 Jan 2020 08:33)  HR: 114 (08 Jan 2020 16:28) (100 - 131)  BP: 119/79 (08 Jan 2020 16:28) (95/65 - 168/97)  BP(mean): --  RR: 19 (08 Jan 2020 16:28) (18 - 20)  SpO2: 97% (08 Jan 2020 16:28) (93% - 99%)    Physical Exam:  General: Well developed, well nourished, in no acute distress  HEENT: NCAT, PERRLA, EOMI bl, moist mucous membranes   Neck: Supple, nontender, no mass  Neurology: A&Ox3, nonfocal, CN II-XII grossly intact, sensation intact, no gait abnormalities   Respiratory: CTA B/L, No wheezing, rales, or rhonchi  CV: RRR, S1/S2 present, no murmurs, rubs, or gallops  Abdominal: Soft, nontender, non-distended, normoactive bowel sounds  Extremities: No C/C/E, peripheral pulses present  MSK: Normal ROM, no joint erythema or warmth, no joint swelling   Skin: warm, dry, normal color, no obvious rash or abnormal lesions    LABS:                        11.5   11.07 )-----------( 542      ( 08 Jan 2020 06:39 )             35.7     08 Jan 2020 06:39    134    |  95     |  15     ----------------------------<  132    4.1     |  34     |  0.49     Ca    8.9        08 Jan 2020 06:39          CAPILLARY BLOOD GLUCOSE      POCT Blood Glucose.: 153 mg/dL (08 Jan 2020 19:06)  POCT Blood Glucose.: 100 mg/dL (08 Jan 2020 16:35)        RADIOLOGY & ADDITIONAL TESTS:      Assessment/Plan:      -Will continue to follow, RN to call if any changes. Resident Rapid Response Note    Patient is a 59y old  Female who presents with a chief complaint of pneumothorax, colitis, UTI (08 Jan 2020 17:07)      Rapid response was called at 19:01 on this 59y Female patient for sob and desaturation.    Patient was seen and examined at the bedside by the rapid response team. ICU PA at bedside. Complaining of sob that worsened just now. Desatted to 85% on NC. Patient is lethargic, but arousable. No other acute complaints    Rapid Response Vital Signs:  BP: 160/87  HR: 137  RR: 33  SpO2: 89% on 4L NC -> 100% on nonrebreather  Temp: 99.8  FS: 153    Vital Signs Last 24 Hrs  T(C): 36.8 (08 Jan 2020 16:28), Max: 39.4 (08 Jan 2020 08:33)  T(F): 98.3 (08 Jan 2020 16:28), Max: 103 (08 Jan 2020 08:33)  HR: 114 (08 Jan 2020 16:28) (100 - 131)  BP: 119/79 (08 Jan 2020 16:28) (95/65 - 168/97)  BP(mean): --  RR: 19 (08 Jan 2020 16:28) (18 - 20)  SpO2: 97% (08 Jan 2020 16:28) (93% - 99%)    Physical Exam:  General: Frail elderly, tachypneic  HEENT: NCAT  Neurology: A&Ox1-2, nonfocal  Respiratory: coarse breath sounds b/l  CV: tachycardic, S1/S2 present, no murmurs, rubs, or gallops  Abdominal: Soft, nontender, non-distended  Extremities: No C/C/E, peripheral pulses present  Skin: warm, dry    LABS:                        11.5   11.07 )-----------( 542      ( 08 Jan 2020 06:39 )             35.7     08 Jan 2020 06:39    134    |  95     |  15     ----------------------------<  132    4.1     |  34     |  0.49     Ca    8.9        08 Jan 2020 06:39          CAPILLARY BLOOD GLUCOSE      POCT Blood Glucose.: 153 mg/dL (08 Jan 2020 19:06)  POCT Blood Glucose.: 100 mg/dL (08 Jan 2020 16:35)      Assessment/Plan:  59F with PMH of interstitial lung disease hx of pneumothorax (on 2L NC at home), hx pulmonary nodules, Meniere's disease, Non-Hodgkin's lymphoma (SCD/XRT/chemo at [facility] 2003), hx of CVA (3/2019 - residual L sided weakness, unable to use her L arm), not on ASA or Plavix due to GIB, Seizure disorder, tachycardia, MVP, hx of chronic SDH, presents with weakness, weight loss, nausea, and diarrhea admitted for R pneumothorax now with new fever and worsening PTX. Rapid response called for sob and desatting.  -transfer to ICU  -patient placed on nonrebreather  -CXR and CT chest noncontrast ordered  -EKG ordered, sinus tachycardia  -Will continue to follow, RN to call if any changes.  -rapid followup in 2 hours Resident Rapid Response Note    Patient is a 59y old Female who presents with a chief complaint of pneumothorax, colitis, UTI (08 Jan 2020 17:07)    Rapid response was called at 19:01 on this 59y Female patient for SOB and desaturation.    Patient was seen and examined at the bedside by the rapid response team. ICU attending Dr Orozco, ICU PA and Dr Reddy at bedside. Complaining of SOB that worsened just now. Desatted to 85% on 4L NC. Patient is lethargic, but arousable. She feels tired, but denies CP or other acute complaints.    Patient was placed on nonrebreather, which improved saturation to 100%. Stat EKG showed sinus tachycardia 120-130. Stat CXR and CT chest noncontrast ordered.     Rapid Response Vital Signs:  BP: 160/87  HR: 137  RR: 33  SpO2: 89% on 4L NC -> 100% on nonrebreather  Temp: 99.8  FS: 153    Vital Signs Last 24 Hrs  T(C): 36.8 (08 Jan 2020 16:28), Max: 39.4 (08 Jan 2020 08:33)  T(F): 98.3 (08 Jan 2020 16:28), Max: 103 (08 Jan 2020 08:33)  HR: 114 (08 Jan 2020 16:28) (100 - 131)  BP: 119/79 (08 Jan 2020 16:28) (95/65 - 168/97)  BP(mean): --  RR: 19 (08 Jan 2020 16:28) (18 - 20)  SpO2: 97% (08 Jan 2020 16:28) (93% - 99%)    Physical Exam:  General: Frail elderly female, tachypneic and anxious appearing  HEENT: NCAT, PERRL  Neurology: A&Ox1-2, initially lethargic but easily arousable  Respiratory: coarse breath sounds b/l, tachypneic  CV: tachycardic, regular, S1/S2 present, no murmurs, rubs, or gallops  Abdominal: Soft, nontender, non-distended  Extremities: No C/C/E, peripheral pulses present  Skin: warm, dry    LABS:                        11.5   11.07 )-----------( 542      ( 08 Jan 2020 06:39 )             35.7     08 Jan 2020 06:39    134    |  95     |  15     ----------------------------<  132    4.1     |  34     |  0.49     Ca    8.9        08 Jan 2020 06:39    CAPILLARY BLOOD GLUCOSE  POCT Blood Glucose.: 153 mg/dL (08 Jan 2020 19:06)  POCT Blood Glucose.: 100 mg/dL (08 Jan 2020 16:35)      Assessment/Plan:  59F with PMH of interstitial lung disease hx of pneumothorax (on 2L NC at home), hx pulmonary nodules, Meniere's disease, Non-Hodgkin's lymphoma (SCD/XRT/chemo at [facility] [place] 2003), hx of CVA (3/2019 - residual L sided weakness, unable to use her L arm), not on ASA or Plavix due to GIB, Seizure disorder, tachycardia, MVP, hx of chronic SDH, presented with weakness, weight loss, nausea, and diarrhea admitted for R pneumothorax now with new fever and worsening PTX. Rapid response called for SOB and desatting. Transferred to the ICU for acute hypoxic respiratory failure, moderate PTX, AMS/lethargy, advanced interstitial lung disease, severe sepsis with likely superimposed PNA.    -EKG showed sinus tachycardia  -CXR appeared similar to prior, will follow up official read  -CT chest noncontrast ordered  -Continue nonrebreather initially, then transition to HFNC  -Transfer to ICU for further management  -Attendings Dr Orozco and Dr Reddy in agreement with plan.  -Will follow up rapid in 2 hours and continue to monitor, RN to call if any changes.

## 2020-01-08 NOTE — PROGRESS NOTE ADULT - ASSESSMENT
59F with PMH of interstitial lung disease hx of pneumothorax (on 2L NC at home), hx pulmonary nodules, Meniere's disease, Non-Hodgkin's lymphoma (SCD/XRT/chemo at [facility] 2003), hx of CVA (3/2019 - residual L sided weakness, unable to use her L arm), not on ASA or Plavix due to GIB, Seizure disorder, tachycardia, MVP, hx of chronic SDH, presents with weakness, weight loss, nausea, and diarrhea admitted for R pneumothorax now with new fever and worsening PTX.

## 2020-01-08 NOTE — PROGRESS NOTE ADULT - SUBJECTIVE AND OBJECTIVE BOX
Date/Time Patient Seen:  		  Referring MD:   Data Reviewed	       Patient is a 59y old  Female who presents with a chief complaint of pneumothorax, colitis, UTI (07 Jan 2020 13:43)      Subjective/HPI     PAST MEDICAL & SURGICAL HISTORY:  Interstitial lung disease: on home o2 prn  NHL (non-Hodgkin's lymphoma): Agem 43 sp chemo/rt/stem cell  Transient cerebral ischemia, unspecified type  Mitral prolapse  Pulmonary disease  History of tonsillectomy  History of appendectomy        Medication list         MEDICATIONS  (STANDING):  enoxaparin Injectable 40 milliGRAM(s) SubCutaneous daily  hydrocortisone 2.5% Rectal Cream 1 Application(s) Rectal two times a day  hydrocortisone hemorrhoidal Suppository 1 Suppository(s) Rectal two times a day  influenza   Vaccine 0.5 milliLiter(s) IntraMuscular once  lactobacillus acidophilus 1 Tablet(s) Oral three times a day with meals  lidocaine 2% Gel 1 Application(s) Topical three times a day  OXcarbazepine 300 milliGRAM(s) Oral two times a day  pantoprazole    Tablet 40 milliGRAM(s) Oral before breakfast  propranolol 20 milliGRAM(s) Oral three times a day  sucralfate 1 Gram(s) Oral two times a day  tiotropium 18 MICROgram(s) Capsule 1 Capsule(s) Inhalation daily  witch hazel Pads 1 Application(s) Topical three times a day    MEDICATIONS  (PRN):  acetaminophen   Tablet .. 650 milliGRAM(s) Oral every 6 hours PRN Temp greater or equal to 38C (100.4F), Mild Pain (1 - 3)  ALBUTerol    0.083% 2.5 milliGRAM(s) Nebulizer every 6 hours PRN Shortness of Breath and/or Wheezing  ondansetron    Tablet 4 milliGRAM(s) Oral every 12 hours PRN Nausea  simethicone 80 milliGRAM(s) Chew two times a day PRN Upset Stomach         Vitals log        ICU Vital Signs Last 24 Hrs  T(C): 36.8 (08 Jan 2020 04:41), Max: 38.4 (07 Jan 2020 16:00)  T(F): 98.3 (08 Jan 2020 04:41), Max: 101.1 (07 Jan 2020 16:00)  HR: 108 (08 Jan 2020 06:29) (96 - 131)  BP: 168/97 (08 Jan 2020 04:41) (97/67 - 168/97)  BP(mean): --  ABP: --  ABP(mean): --  RR: 19 (08 Jan 2020 04:41) (18 - 19)  SpO2: 93% (08 Jan 2020 04:41) (92% - 99%)           Input and Output:  I&O's Detail    07 Jan 2020 07:01  -  08 Jan 2020 07:00  --------------------------------------------------------  IN:    Oral Fluid: 620 mL  Total IN: 620 mL    OUT:    Voided: 220 mL  Total OUT: 220 mL    Total NET: 400 mL          Lab Data                        11.5   11.07 )-----------( 542      ( 08 Jan 2020 06:39 )             35.7     01-08    134<L>  |  95<L>  |  15  ----------------------------<  132<H>  4.1   |  34<H>  |  0.49<L>    Ca    8.9      08 Jan 2020 06:39              Review of Systems	      Objective     Physical Examination    head at  heart s1s2  lung dec BS  abd soft  head nc      Pertinent Lab findings & Imaging      Shauna:  NO   Adequate UO     I&O's Detail    07 Jan 2020 07:01  -  08 Jan 2020 07:00  --------------------------------------------------------  IN:    Oral Fluid: 620 mL  Total IN: 620 mL    OUT:    Voided: 220 mL  Total OUT: 220 mL    Total NET: 400 mL               Discussed with:     Cultures:	        Radiology

## 2020-01-08 NOTE — PROGRESS NOTE ADULT - SUBJECTIVE AND OBJECTIVE BOX
INTERVAL HPI/OVERNIGHT EVENTS:  pt seen and examined  no new complaints     MEDICATIONS  (STANDING):  enoxaparin Injectable 40 milliGRAM(s) SubCutaneous daily  hydrocortisone 2.5% Rectal Cream 1 Application(s) Rectal two times a day  hydrocortisone hemorrhoidal Suppository 1 Suppository(s) Rectal two times a day  influenza   Vaccine 0.5 milliLiter(s) IntraMuscular once  lactobacillus acidophilus 1 Tablet(s) Oral three times a day with meals  lidocaine 2% Gel 1 Application(s) Topical three times a day  OXcarbazepine 300 milliGRAM(s) Oral two times a day  pantoprazole    Tablet 40 milliGRAM(s) Oral before breakfast  propranolol 20 milliGRAM(s) Oral three times a day  sucralfate 1 Gram(s) Oral two times a day  tiotropium 18 MICROgram(s) Capsule 1 Capsule(s) Inhalation daily  witch hazel Pads 1 Application(s) Topical three times a day    MEDICATIONS  (PRN):  acetaminophen   Tablet .. 650 milliGRAM(s) Oral every 6 hours PRN Temp greater or equal to 38C (100.4F), Mild Pain (1 - 3)  ALBUTerol    0.083% 2.5 milliGRAM(s) Nebulizer every 6 hours PRN Shortness of Breath and/or Wheezing  ketorolac   Injectable 15 milliGRAM(s) IV Push every 4 hours PRN Moderate Pain (4 - 6)  metoclopramide Injectable 5 milliGRAM(s) IV Push every 8 hours PRN nausea/ vomiting  ondansetron Injectable 4 milliGRAM(s) IV Push every 8 hours PRN Nausea and/or Vomiting  simethicone 80 milliGRAM(s) Chew two times a day PRN Upset Stomach      Allergies    IV Contrast (Anaphylaxis)  shellfish. (Anaphylaxis)    Intolerances        Review of Systems:    General:  No wt loss, fevers, chills, night sweats, fatigue   Eyes:  Good vision, no reported pain  ENT:  No sore throat, pain, runny nose, dysphagia  CV:  No pain, palpitations, hypo/hypertension  Resp: cough  GI:  No pain, No nausea, No vomiting, No diarrhea, No constipation, No weight loss, No fever, No pruritis, No rectal bleeding, No melena, No dysphagia  :  No pain, bleeding, incontinence, nocturia  Muscle:  No pain, weakness  Neuro:  No weakness, tingling, memory problems  Psych:  No fatigue, insomnia, mood problems, depression  Endocrine:  No polyuria, polydypsia, cold/heat intolerance  Heme:  No petechiae, ecchymosis, easy bruisability  Skin:  No rash, tattoos, scars, edema      Vital Signs Last 24 Hrs  T(C): 36.4 (07 Jan 2020 07:53), Max: 37.6 (07 Jan 2020 00:05)  T(F): 97.6 (07 Jan 2020 07:53), Max: 99.6 (07 Jan 2020 00:05)  HR: 76 (07 Jan 2020 07:53) (76 - 118)  BP: 94/67 (07 Jan 2020 07:53) (91/60 - 149/83)  BP(mean): --  RR: 18 (07 Jan 2020 07:53) (18 - 19)  SpO2: 94% (07 Jan 2020 07:53) (92% - 100%)    PHYSICAL EXAM:    Constitutional: lying in bed  HEENT: ncat  Neck: No LAD  Gastrointestinal: soft nt nd  Extremities: No peripheral edema  Neurological: Awake alert responds appropriately    LABS:                RADIOLOGY & ADDITIONAL TESTS:

## 2020-01-08 NOTE — PROGRESS NOTE ADULT - PROBLEM SELECTOR PLAN 1
overnight events noted - will check CXR this am to eval change in PTX on right -   supportive medical regimen  chronic lung disease  follows with Dr. Hazel in [facility]   anxiety - emotional support  labs and imaging reviewed  PT as tolerated  planned for NEHA  nutrition  assist with ADL

## 2020-01-08 NOTE — PROGRESS NOTE ADULT - PROBLEM SELECTOR PLAN 3
-Continue Propranolol TID with hold parameters  - 1/2/20 patient had episodes of nonsustained tachycardia to the 120s (propanolol was held for low BP in the AM and afternoon).  Patient was given a bolus of fluid and received her third dose in the PM.  No other events on tele  - 1/6/20 patient had another episode of sbps in the 90s, patient was give 250ml bolus without improvement and another 500mL bolus.  - 1/8 patient with another episode of sbps in 90s will give maintenance fluids since patient is not eating/drinking much

## 2020-01-08 NOTE — PROGRESS NOTE ADULT - PROBLEM SELECTOR PLAN 2
moderate right pneumothorax  -following serial xray unchanged for serveral days but appears worsened today on chest xray  -continue to encourage incentive spirometry use  -patient has h/o ILD (pt. occasionally uses this at home) will attempt to wean to room air as tolerated.  saturating 93-99% on 2L nasal canula  -out of bed as tolerated  -Cardiothoracic surgery, Dr. France consulted -daily CXR to monitor progress of PTX were done, now stabilized  -Pulmonology, Dr. Rashid consulted - continue Spiriva and duonebs  -PT/OT recs --> NEHA moderate right pneumothorax  -following serial xray unchanged for several days but appears worsened today on chest xray  -continue to encourage incentive spirometry use  -patient has h/o ILD (pt. occasionally uses this at home) will attempt to wean to room air as tolerated.  saturating 93-99% on 2L nasal canula  -out of bed as tolerated  -Cardiothoracic surgery, Dr. France consulted -daily CXR to monitor progress of PTX were done, now stabilized  -Pulmonology, Dr. Rashid consulted - continue Spiriva and duonebs  -PT/OT recs --> NEHA

## 2020-01-08 NOTE — PROVIDER CONTACT NOTE (OTHER) - ACTION/TREATMENT ORDERED:
EKG stat and continue to monitor
 to assess pt
administer pain meds as ordered. no further action taken as per Dr fu. pt admitted for pneumothorax with pleuritic chest pains. continue to monitor
Per , she is going to come assess the pt at the bedside.

## 2020-01-08 NOTE — PROGRESS NOTE ADULT - SUBJECTIVE AND OBJECTIVE BOX
Patient is a 59y old  Female who presents with a chief complaint of pneumothorax, colitis, UTI (08 Jan 2020 11:25)      FROM ADMISSION H+P:   HPI:  59F with PMH of interstitial lung disease hx of pneumothorax (on 2L NC at home), hx pulmonary nodules, Meniere's disease, Non-Hodgkin's lymphoma (SCD/XRT/chemo at [facility] 2003), hx of CVA (3/2019 - residual L sided weakness, unable to use her L arm), not on ASA or Plavix due to GIB, Seizure disorder, tachycardia, MVP, hx of chronic SDH, presents with weakness, weight loss, nausea, and diarrhea for the past month. Patient admits to 4 lb weight loss in 3 weeks, was seen by her PCP and started on Zofran one week ago. Patient reports going to wound care prior to admission for bed sores, and was advised ED evaluation. Per  at bedside, patient has been having loose BMs several times a day with foul smelling urine for 4 days. Denies fever. Patient also has felt too weak to get out of bed and started using a diaper to urinate/have BMs. Has mainly been wheelchair bound recently due to weakness, but at baseline patient is able to stand and walk short distances. Patient uses 2L NC at home and noticed her SpO2 at 75% on RA with ambulation. Patient was also switched to Oxcarbazepine 2-3 months ago and noticed her symptoms of anxiety, weakness, and "memory" have worsened.  Of note, at night when patient takes her Oxcarbazepine, she starts yelling and having "hallucinations."     In the ED: temp 97.6, HR 97, /80, RR 15, SpO2 92% RA, 98% on 3L NC. WBC 11.10. UA: moderate LEC, WBC 26-50, moderate bacteria. Chest x-ray: Interval enlargement of a right-sided pneumothorax. Small bilateral pleural effusions. Chronic lung fibrotic changes. CT chest/abd/pelvis: Moderate-sized right pneumothorax. Worsening groundglass opacities in both lower lobes, possibly infection. Stercoral colitis. Indeterminant hepatic lesions, possibly metastatic disease. Received IV Rocephin, IV Flagyl, 1L NS Bolus. EKG: NSR 88    Of note,  wants us to check oxcarbazepine levels. (26 Dec 2019 21:14)      ----  INTERVAL HPI/OVERNIGHT EVENTS: Around 4pm last night and this morning around 8AM patient spiked a fevers Tmax 103.  BCx and an AM chest xray was ordered.  Patient had an episode of tachycardia and anxiety noted from night team. On tele with episodes of tachycardia to the 140s.  Pt seen and evaluated at the bedside. Patient complaining of not feeling well, with fevers/chills.  Patient denies any pain with urination, burning but states that the external catheter is causing some discomfort.  Patient states she has a new nonproductive cough.  Denies any chest pain.     ----  PAST MEDICAL & SURGICAL HISTORY:  Interstitial lung disease: on home o2 prn  NHL (non-Hodgkin's lymphoma): Agem 43 sp chemo/rt/stem cell  Transient cerebral ischemia, unspecified type  Mitral prolapse  History of tonsillectomy  History of appendectomy      FAMILY HISTORY:  Family history of stroke  Family history of breast cancer: Mother      Allergies    IV Contrast (Anaphylaxis)  shellfish. (Anaphylaxis)    Intolerances        ----  REVIEW OF SYSTEMS:  CONSTITUTIONAL: admits to fever, chills, denies fatigue, weakness  HEENT: denies blurred vision, sore throat  SKIN: denies new lesions, rash  CARDIOVASCULAR: denies chest pain, chest pressure, palpitations  RESPIRATORY: admits to cough, denies shortness of breath, sputum production  GASTROINTESTINAL: denies nausea, vomiting, diarrhea, abdominal pain  GENITOURINARY: denies dysuria, discharge  NEUROLOGICAL: denies numbness, headache, focal weakness  MUSCULOSKELETAL: denies new joint pain, muscle aches  HEMATOLOGIC: denies gross bleeding, bruising      ----  PHYSICAL EXAM:  GENERAL: patient appears uncomfortable, no acute distress, appropriately interactive at times, inappropriate at times.   EYES: sclera clear, no exudates  ENMT: oropharynx clear without erythema, moist mucous membranes  NECK: supple, soft, no thyromegaly noted  LUNGS: mild crackles auscultated in R base, no wheezes, no rhonchi.   HEART: soft S1/S2, regular rate and rhythm, no murmurs noted, no noted edema to b/l LE  GASTROINTESTINAL: abdomen is soft, nontender, nondistended, normoactive bowel sounds, no palpable masses  INTEGUMENT: warm to touch, good skin turgor, appropriate for ethnicity, appears well perfused, no jaundice noted  MUSCULOSKELETAL: no clubbing or cyanosis, no obvious deformity  NEUROLOGIC: awake, alert, oriented x3, good muscle tone in 4 extremities, no obvious sensory deficits  HEME/LYMPH: no palpable supraclavicular nodules, no obvious ecchymosis     T(C): 36.9 (01-08-20 @ 14:13), Max: 39.4 (01-08-20 @ 08:33)  HR: 116 (01-08-20 @ 14:13) (100 - 131)  BP: 95/65 (01-08-20 @ 14:13) (95/65 - 168/97)  RR: 18 (01-08-20 @ 14:13) (18 - 20)  SpO2: 97% (01-08-20 @ 14:13) (92% - 99%)  Wt(kg): --    ----  I&O's Summary    07 Jan 2020 07:01  -  08 Jan 2020 07:00  --------------------------------------------------------  IN: 620 mL / OUT: 220 mL / NET: 400 mL    08 Jan 2020 07:01  -  08 Jan 2020 14:49  --------------------------------------------------------  IN: 100 mL / OUT: 400 mL / NET: -300 mL        LABS:                        11.5   11.07 )-----------( 542      ( 08 Jan 2020 06:39 )             35.7     01-08    134<L>  |  95<L>  |  15  ----------------------------<  132<H>  4.1   |  34<H>  |  0.49<L>    Ca    8.9      08 Jan 2020 06:39          CAPILLARY BLOOD GLUCOSE                    ----  Personally reviewed:  Vital sign trends: [ x ] yes    [  ] no     [  ] n/a  Laboratory results: [ x ] yes    [  ] no     [  ] n/a  Radiology results: [ x ] yes    [  ] no     [  ] n/a  Culture results: [  ] yes    [  ] no     [ x ] n/a  Consultant recommendations: [ x ] yes    [  ] no     [  ] n/a Patient is a 59y old  Female who presents with a chief complaint of pneumothorax, colitis, UTI (08 Jan 2020 11:25)      FROM ADMISSION H+P:   HPI:  59F with PMH of interstitial lung disease hx of pneumothorax (on 2L NC at home), hx pulmonary nodules, Meniere's disease, Non-Hodgkin's lymphoma (SCD/XRT/chemo at [facility] 2003), hx of CVA (3/2019 - residual L sided weakness, unable to use her L arm), not on ASA or Plavix due to GIB, Seizure disorder, tachycardia, MVP, hx of chronic SDH, presents with weakness, weight loss, nausea, and diarrhea for the past month. Patient admits to 4 lb weight loss in 3 weeks, was seen by her PCP and started on Zofran one week ago. Patient reports going to wound care prior to admission for bed sores, and was advised ED evaluation. Per  at bedside, patient has been having loose BMs several times a day with foul smelling urine for 4 days. Denies fever. Patient also has felt too weak to get out of bed and started using a diaper to urinate/have BMs. Has mainly been wheelchair bound recently due to weakness, but at baseline patient is able to stand and walk short distances. Patient uses 2L NC at home and noticed her SpO2 at 75% on RA with ambulation. Patient was also switched to Oxcarbazepine 2-3 months ago and noticed her symptoms of anxiety, weakness, and "memory" have worsened.  Of note, at night when patient takes her Oxcarbazepine, she starts yelling and having "hallucinations."    ----  INTERVAL HPI/OVERNIGHT EVENTS: Around 4pm last night and this morning around 8AM patient spiked a fevers Tmax 103.  BCx and an AM chest xray was ordered.  Patient had an episode of tachycardia and anxiety noted from night team. On tele with episodes of tachycardia to the 140s.  Pt seen and evaluated at the bedside. Patient complaining of not feeling well, with fevers/chills.  Patient denies any pain with urination, burning but states that the external catheter is causing some discomfort.  Patient states she has a new nonproductive cough.  Denies any chest pain.     ----  PAST MEDICAL & SURGICAL HISTORY:  Interstitial lung disease: on home o2 prn  NHL (non-Hodgkin's lymphoma): Agem 43 sp chemo/rt/stem cell  Transient cerebral ischemia, unspecified type  Mitral prolapse  History of tonsillectomy  History of appendectomy      FAMILY HISTORY:  Family history of stroke  Family history of breast cancer: Mother      Allergies    IV Contrast (Anaphylaxis)  shellfish. (Anaphylaxis)    Intolerances        ----  REVIEW OF SYSTEMS:  CONSTITUTIONAL: admits to fever, chills, denies fatigue, weakness  HEENT: denies blurred vision, sore throat  SKIN: denies new lesions, rash  CARDIOVASCULAR: denies chest pain, chest pressure, palpitations  RESPIRATORY: admits to cough, denies shortness of breath, sputum production  GASTROINTESTINAL: denies nausea, vomiting, diarrhea, abdominal pain  GENITOURINARY: denies dysuria, discharge  NEUROLOGICAL: denies numbness, headache, focal weakness  MUSCULOSKELETAL: denies new joint pain, muscle aches  HEMATOLOGIC: denies gross bleeding, bruising      ----  PHYSICAL EXAM:  GENERAL: patient appears uncomfortable, no acute distress, appropriately interactive at times, inappropriate at times.   EYES: sclera clear, no exudates  ENMT: oropharynx clear without erythema, moist mucous membranes  NECK: supple, soft, no thyromegaly noted  LUNGS: mild crackles auscultated in R base, no wheezes, no rhonchi.   HEART: soft S1/S2, regular rate and rhythm, no murmurs noted, no noted edema to b/l LE  GASTROINTESTINAL: abdomen is soft, nontender, nondistended, normoactive bowel sounds, no palpable masses  INTEGUMENT: warm to touch, good skin turgor, appropriate for ethnicity, appears well perfused, no jaundice noted  MUSCULOSKELETAL: no clubbing or cyanosis, no obvious deformity  NEUROLOGIC: awake, alert, oriented x3, good muscle tone in 4 extremities, no obvious sensory deficits  HEME/LYMPH: no palpable supraclavicular nodules, no obvious ecchymosis     T(C): 36.9 (01-08-20 @ 14:13), Max: 39.4 (01-08-20 @ 08:33)  HR: 116 (01-08-20 @ 14:13) (100 - 131)  BP: 95/65 (01-08-20 @ 14:13) (95/65 - 168/97)  RR: 18 (01-08-20 @ 14:13) (18 - 20)  SpO2: 97% (01-08-20 @ 14:13) (92% - 99%)  Wt(kg): --    ----  I&O's Summary    07 Jan 2020 07:01  -  08 Jan 2020 07:00  --------------------------------------------------------  IN: 620 mL / OUT: 220 mL / NET: 400 mL    08 Jan 2020 07:01  -  08 Jan 2020 14:49  --------------------------------------------------------  IN: 100 mL / OUT: 400 mL / NET: -300 mL        LABS:                        11.5   11.07 )-----------( 542      ( 08 Jan 2020 06:39 )             35.7     01-08    134<L>  |  95<L>  |  15  ----------------------------<  132<H>  4.1   |  34<H>  |  0.49<L>    Ca    8.9      08 Jan 2020 06:39          CAPILLARY BLOOD GLUCOSE                    ----  Personally reviewed:  Vital sign trends: [ x ] yes    [  ] no     [  ] n/a  Laboratory results: [ x ] yes    [  ] no     [  ] n/a  Radiology results: [ x ] yes    [  ] no     [  ] n/a  Culture results: [  ] yes    [  ] no     [ x ] n/a  Consultant recommendations: [ x ] yes    [  ] no     [  ] n/a

## 2020-01-08 NOTE — PROGRESS NOTE ADULT - SUBJECTIVE AND OBJECTIVE BOX
Neurology follow up note    ROMIE [id number]      Interval History:    Patient feels ok no new complaints.    MEDICATIONS    acetaminophen   Tablet .. 650 milliGRAM(s) Oral every 6 hours PRN  ALBUTerol    0.083% 2.5 milliGRAM(s) Nebulizer every 6 hours PRN  enoxaparin Injectable 40 milliGRAM(s) SubCutaneous daily  hydrocortisone 2.5% Rectal Cream 1 Application(s) Rectal two times a day  hydrocortisone hemorrhoidal Suppository 1 Suppository(s) Rectal two times a day  influenza   Vaccine 0.5 milliLiter(s) IntraMuscular once  lactobacillus acidophilus 1 Tablet(s) Oral three times a day with meals  lidocaine 2% Gel 1 Application(s) Topical three times a day  ondansetron    Tablet 4 milliGRAM(s) Oral every 12 hours PRN  OXcarbazepine 300 milliGRAM(s) Oral two times a day  pantoprazole    Tablet 40 milliGRAM(s) Oral before breakfast  piperacillin/tazobactam IVPB.. 3.375 Gram(s) IV Intermittent every 8 hours  propranolol 20 milliGRAM(s) Oral three times a day  simethicone 80 milliGRAM(s) Chew two times a day PRN  sucralfate 1 Gram(s) Oral two times a day  tiotropium 18 MICROgram(s) Capsule 1 Capsule(s) Inhalation daily  witch hazel Pads 1 Application(s) Topical three times a day      Allergies    IV Contrast (Anaphylaxis)  shellfish. (Anaphylaxis)    Intolerances            Vital Signs Last 24 Hrs  T(C): 39.4 (08 Jan 2020 08:33), Max: 39.4 (08 Jan 2020 08:33)  T(F): 103 (08 Jan 2020 08:33), Max: 103 (08 Jan 2020 08:33)  HR: 111 (08 Jan 2020 08:33) (96 - 131)  BP: 127/80 (08 Jan 2020 08:33) (97/67 - 168/97)  BP(mean): --  RR: 20 (08 Jan 2020 08:33) (18 - 20)  SpO2: 96% (08 Jan 2020 08:33) (92% - 99%)      REVIEW OF SYSTEMS:  Constitutional:  The patient denies fever, chills, or night sweats.  Head:  No headaches.  Eyes:  No double vision or blurry vision.  Ears:  No ringing in the ears.  Neck:  No neck pain.  Respiratory:  Occasional shortness of breath.  Cardiovascular:  Positive right-sided chest pain.  Abdomen:  occ nausea,  no vomiting, or abdominal pain.  Extremities/Neurological:  No numbness or tingling.  Musculoskeletal:  Occasional joint pain.    PHYSICAL EXAMINATION:   HEENT:  Head:  Normocephalic, atraumatic.  Eyes:  No scleral icterus.  Ears:  Hearing bilaterally appeared to be intact.  NECK:  Supple.  RESPIRATORY:  Decreased breath sounds bilaterally, but most prominent on the right.  CARDIOVASCULAR:  S1 and S2 heard.  ABDOMEN:  Soft and nontender.  Extremities:  No clubbing or cyanosis were noted.      NEUROLOGIC:  The patient is awake and alert.  Location was [facility], year was 2019, month was December.  Was able to name objects.  Extraocular movements were intact.  Pupils were equal, round, and reactive bilaterally 3 mm to 2 mm.  Speech was fluent.  Smile was symmetric.  Motor:  Right upper was 5/5, left upper was 3/5 decrease rom hand and finger in flexed position , right lower was 4/5, left lower was 3+/5.  As per my conversation with the spouse, after her apparent event back in March, questionable seizure versus questionable stroke.  She was left with left-sided weakness.  Sensory:  Bilateral upper and lower appeared intact to light touch.                LABS:  CBC Full  -  ( 08 Jan 2020 06:39 )  WBC Count : 11.07 K/uL  RBC Count : 4.07 M/uL  Hemoglobin : 11.5 g/dL  Hematocrit : 35.7 %  Platelet Count - Automated : 542 K/uL  Mean Cell Volume : 87.7 fl  Mean Cell Hemoglobin : 28.3 pg  Mean Cell Hemoglobin Concentration : 32.2 gm/dL  Auto Neutrophil # : 9.73 K/uL  Auto Lymphocyte # : 0.57 K/uL  Auto Monocyte # : 0.67 K/uL  Auto Eosinophil # : 0.02 K/uL  Auto Basophil # : 0.03 K/uL  Auto Neutrophil % : 87.8 %  Auto Lymphocyte % : 5.1 %  Auto Monocyte % : 6.1 %  Auto Eosinophil % : 0.2 %  Auto Basophil % : 0.3 %      01-08    134<L>  |  95<L>  |  15  ----------------------------<  132<H>  4.1   |  34<H>  |  0.49<L>    Ca    8.9      08 Jan 2020 06:39      Hemoglobin A1C:       Vitamin B12         RADIOLOGY    ANALYSIS AND PLAN:  This is a 59-year-old with a history of possibly epilepsy verse TIAs, history of chronic subdural hydromas and change in mental status.    1.	For episode of change in mental status, as per my conversation with the spouse, these appear to occur primarily at the same time at night for the last three to four weeks.  The patient has been on Trileptal for about eight to nine months.  Suspect less likely this is Trileptal, but I will check Trileptal levels.  Questionable, the patient could have any type of sleep-related disorder causing these events to occur at night.  I spoke with the spouse, the patient should undergo sleep studies.  2.	For history of chronic subdural hematoma, these appeared to have resolved from previous MRI.  3.	For history of possible underlying epilepsy, for now, I will continue the patient on her Trileptal.  4.	For episode of left-sided hemipareses of unclear etiology, as per my conversation with the spouse, there was a question that this was from a seizure event versus possibly a TIA even though MRI did not show cerebrovascular accident.  Unclear, the patient has any type of underlying neurodegenerative disease that could be explaining this.  The patient was seen at [place] and does follow up with an outside neurologist.  5.	Monitor CO2 levels as needed  6.	spoke outside neurologist Dr. Wallace in past agrees to continue trileptal for now  His telephone number is [phone].  7.	Spouse's name is Marialuisa, his telephone number is [phone]   8.	appears more interactive   9.	trileptal levels ok   10.	no new events   11.	temperatures infection work up as needed   12.	neurologic wise stable   13.	Greater than 27 minutes of time was spent with the patient, plan of care, reviewing data, speaking to the family and multidisciplinary healthcare team

## 2020-01-08 NOTE — CONSULT NOTE ADULT - ASSESSMENT
ASSESSMENT   1.   2.   3.   4.   5.     PLAN     NEURO:     PULM:      CV:     GI:      :     ENDO:     ID:     HEME/DVT PPX:     ETHICS        CODE STATUS: Full Code  GO discussion: Y    Critical Care time: 40 mins assessing presenting problems of acute illness that poses high probability of life threatening deterioration or end organ damage/dysfunction.  Medical decision making including Initiating plan of care, reviewing data, reviewing radiology, direct patient bedside evaluation and interpretation of vital signs, any necessary ventilator management , discussion with multidisciplinary team, discussing goals of care with patient/family, all non inclusive of procedures     Care discussed with bedside provider and eICU attending. If any additional assistance in care/management of patient required by bedside team, provider/RN/family member advised to push "e-alert" button in patient's room, and details will be discussed at that time ASSESSMENT   59F with PMH of ILD (unknown type) seen by [facility] (Dr. Hazel) hx of pneumothorax (on 2L NC at home), hx pulmonary nodules, Meniere's disease, Non-Hodgkin's lymphoma (SCD/XRT/chemo at [facility] 2003), hx of CVA (3/2019 - residual L sided weakness, unable to use her L arm), not on ASA or Plavix due to GIB, Seizure disorder, tachycardia, MVP, hx of chronic SDH, presents with weakness, weight loss, nausea, and diarrhea admitted for R pneumothorax now with new fever and worsening PTX.     RRT called for lethargy, hypoxia, with increased WOB and sinus tachycardia with HR in 120-130's.  Pt placed on 100% NRB, with improvement in SpO2 to 100%. Pt seemingly lethargic, and remained tachycardic, pt with coarse rhonchi throughout.  CXR with diffuse intersitial lung marking and potential superimposed PNA, cannot conclude if PTX is worsening. Pt sent for a CT Chest and placed on HFNC and transferred to the ICU for acute hypoxic respiratory failure, moderate PTX, AMS/lethargy, advanced interstitial lung disease, severe sepsis with likely superimposed PNA     1. acute hypoxic respiratory failure   2. severe sepsis likely with superimposed PNA   3. Moderate PTX  4. AMS/lethargy  5. Advanced interstitial lung disease    PLAN     NEURO:   -tylenol for fever PRN   -Trileptal for seizure d/o    PULM:    -Pt placed on HFNC 45/50%, SpO2 100%  -CT Chest with worsening confluent groundglass opacities and bibasilar consolidation superimposed on diffuse subpleural reticular opacities. Findings may represent acute pneumonia superimposed on top of underlying interstitial lung disease. Stable moderate-sized right pneumothorax. Trace left pleural effusion.  -Will keep pigtail cath at bedside in the setting that pt decompensates   -Thoracic surgery Dr. Cowan made aware of pt and will eval patient for possible tube placement in AM   -Dr. Gay Colon made aware of pt status, discussed idea of steroids with him, he does not think this type of ILD will benefit from steroids   -Nebs as needed   -Continue spiriva    CV:   -Continue propanolol     GI:    -will keep NPO ON, in the setting of decline in mental status eariler, aspiration precautions   -Zofran PRN   -Senna     :   -Check repeat labs and monitor renal function trend  -Encourage PO intake as tolerated  -Avoid hypotension and optimize BP with IVF and pressor support if needed.   -Get renal sonogram. Avoid nephrotoxic meds as possible. Avoid ACEI, ARB and NSAIDS  -Monitor input and output     ENDO:   -UYEN     ID:   [id number]   -swap NTD  -BS coverage with zosyn     HEME/DVT PPX:   Lovenox SQ DVT ppx     ETHICS        CODE STATUS: Full Code  GOC discussion: Y    Critical Care time: 40 mins assessing presenting problems of acute illness that poses high probability of life threatening deterioration or end organ damage/dysfunction.  Medical decision making including Initiating plan of care, reviewing data, reviewing radiology, direct patient bedside evaluation and interpretation of vital signs, any necessary ventilator management , discussion with multidisciplinary team, discussing goals of care with patient/family, all non inclusive of procedures     Care discussed with bedside provider and eICU attending. If any additional assistance in care/management of patient required by bedside team, provider/RN/family member advised to push "e-alert" button in patient's room, and details will be discussed at that time

## 2020-01-08 NOTE — CONSULT NOTE ADULT - SUBJECTIVE AND OBJECTIVE BOX
CC:  Patient is a 59y old  Female who presents with a chief complaint of pneumothorax, colitis, UTI (08 Jan 2020 17:07)      HPI/BRIEF HOSPITAL COURSE:   59F with PMH of ILD (unknown type) hx of pneumothorax (on 2L NC at home), hx pulmonary nodules, Meniere's disease, Non-Hodgkin's lymphoma (SCD/XRT/chemo at [facility] 2003), hx of CVA (3/2019 - residual L sided weakness, unable to use her L arm), not on ASA or Plavix due to GIB, Seizure disorder, tachycardia, MVP, hx of chronic SDH, presents with weakness, weight loss, nausea, and diarrhea admitted for R pneumothorax now with new fever and worsening PTX.     Events last 24 hours:     PAST MEDICAL & SURGICAL HISTORY:  Interstitial lung disease: on home o2 prn  NHL (non-Hodgkin's lymphoma): Agem 43 sp chemo/rt/stem cell  Transient cerebral ischemia, unspecified type  Mitral prolapse  History of tonsillectomy  History of appendectomy    Allergies    IV Contrast (Anaphylaxis)  shellfish. (Anaphylaxis)    Intolerances      FAMILY HISTORY:  Family history of stroke  Family history of breast cancer: Mother      Review of Systems:  CONSTITUTIONAL: No fever, chills, or fatigue  EYES: No eye pain, visual disturbances, or discharge  ENMT:  No difficulty hearing, tinnitus, vertigo; No sinus or throat pain  NECK: No pain or stiffness  RESPIRATORY: No cough, wheezing, chills or hemoptysis; No shortness of breath  CARDIOVASCULAR: No chest pain, palpitations, dizziness, or leg swelling  GASTROINTESTINAL: No abdominal or epigastric pain. No nausea, vomiting, or hematemesis; No diarrhea or constipation. No melena or hematochezia.  GENITOURINARY: No dysuria, frequency, hematuria, or incontinence  NEUROLOGICAL: No headaches, memory loss, loss of strength, numbness, or tremors  SKIN: No itching, burning, rashes, or lesions   MUSCULOSKELETAL: No joint pain or swelling; No muscle, back, or extremity pain  PSYCHIATRIC: No depression, anxiety, mood swings, or difficulty sleeping      Medications:  piperacillin/tazobactam IVPB.. 3.375 Gram(s) IV Intermittent every 8 hours    propranolol 20 milliGRAM(s) Oral three times a day    ALBUTerol    0.083% 2.5 milliGRAM(s) Nebulizer every 6 hours PRN  tiotropium 18 MICROgram(s) Capsule 1 Capsule(s) Inhalation daily    acetaminophen   Tablet .. 650 milliGRAM(s) Oral every 6 hours PRN  ondansetron    Tablet 4 milliGRAM(s) Oral every 12 hours PRN  ondansetron Injectable 4 milliGRAM(s) IV Push once  OXcarbazepine 300 milliGRAM(s) Oral two times a day      enoxaparin Injectable 40 milliGRAM(s) SubCutaneous daily    pantoprazole    Tablet 40 milliGRAM(s) Oral before breakfast  simethicone 80 milliGRAM(s) Chew two times a day PRN  sucralfate 1 Gram(s) Oral two times a day        lactated ringers. 1000 milliLiter(s) IV Continuous <Continuous>    influenza   Vaccine 0.5 milliLiter(s) IntraMuscular once    hydrocortisone 2.5% Rectal Cream 1 Application(s) Rectal two times a day  hydrocortisone hemorrhoidal Suppository 1 Suppository(s) Rectal two times a day  lidocaine 2% Gel 1 Application(s) Topical three times a day  witch hazel Pads 1 Application(s) Topical three times a day  zinc oxide 20% Ointment 1 Application(s) Topical two times a day    lactobacillus acidophilus 1 Tablet(s) Oral three times a day with meals          ICU Vital Signs Last 24 Hrs  T(C): 39.1 (08 Jan 2020 20:46), Max: 39.4 (08 Jan 2020 08:33)  T(F): 102.3 (08 Jan 2020 20:46), Max: 103 (08 Jan 2020 08:33)  HR: 101 (08 Jan 2020 22:00) (100 - 134)  BP: 92/59 (08 Jan 2020 22:00) (92/59 - 168/97)  BP(mean): 70 (08 Jan 2020 22:00) (70 - 98)  ABP: --  ABP(mean): --  RR: 32 (08 Jan 2020 22:00) (18 - 39)  SpO2: 100% (08 Jan 2020 22:00) (93% - 100%)    Vital Signs Last 24 Hrs  T(C): 39.1 (08 Jan 2020 20:46), Max: 39.4 (08 Jan 2020 08:33)  T(F): 102.3 (08 Jan 2020 20:46), Max: 103 (08 Jan 2020 08:33)  HR: 101 (08 Jan 2020 22:00) (100 - 134)  BP: 92/59 (08 Jan 2020 22:00) (92/59 - 168/97)  BP(mean): 70 (08 Jan 2020 22:00) (70 - 98)  RR: 32 (08 Jan 2020 22:00) (18 - 39)  SpO2: 100% (08 Jan 2020 22:00) (93% - 100%)        I&O's Detail    07 Jan 2020 07:01  -  08 Jan 2020 07:00  --------------------------------------------------------  IN:    Oral Fluid: 620 mL  Total IN: 620 mL    OUT:    Voided: 220 mL  Total OUT: 220 mL    Total NET: 400 mL      08 Jan 2020 07:01  -  08 Jan 2020 23:12  --------------------------------------------------------  IN:    lactated ringers.: 320 mL    Oral Fluid: 100 mL  Total IN: 420 mL    OUT:    Voided: 400 mL  Total OUT: 400 mL    Total NET: 20 mL            LABS:                        11.5   11.07 )-----------( 542      ( 08 Jan 2020 06:39 )             35.7     01-08    134<L>  |  95<L>  |  15  ----------------------------<  132<H>  4.1   |  34<H>  |  0.49<L>    Ca    8.9      08 Jan 2020 06:39            CAPILLARY BLOOD GLUCOSE      POCT Blood Glucose.: 153 mg/dL (08 Jan 2020 19:06)        CULTURES:    piperacillin/tazobactam IVPB.. 3.375 Gram(s) IV Intermittent every 8 hours      Physical Examination:  General: No acute distress.  Alert, oriented, interactive, nonfocal  NEURO: A&O X3, motor function 5/5 BL UE/LE  HEENT: Pupils equal, reactive to light.  Symmetric.  PULM: CTA BL, no significant sputum production, no wheezes, rales, rhonchi  CVS: Regular rate and rhythm, no murmurs, rubs, or gallops  ABD: Soft, nondistended, nontender, normoactive bowel sounds, no masses  EXT: No edema, nontender  SKIN: Warm and well perfused, no rashes noted      EKG: ***    RADIOLOGY: ***    POCUS:          LUNG:               (+/-) A-line  (+/-) B-line predominantly anteriorly              (+/-) Effusions, (+/-) infiltrate, (+/-) atelectasis, (+/-) B-lines at bases, (+/-) curtain's sign               (+/-)  Lung sliding          CARDIAC:               LV contractility/EF WNL, (+/-)LVH              (+/-) atrial enlargement               parasternal short view concentric w/o wall motion abnormality              (+/-) signs of RV strain, (+/-) septal flattening/D'ing, (+/-) Abreu's sign               (+/-) pericardial effusion               (+/-) valvular dz or notable vegetations               5 chamber VTI ***              Subcostal view IVC ***cm             ABDOMEN:              (+/-) abdominal ascites              (+/-) hydronephrosis          DVT STUDY: (+/-) noted thrombus [X] common femoral [X] deep femoral [X] popliteal       CENTRAL LINE: N          DATE INSERTED:                  LUKE: N                        DATE INSERTED:                  A-LINE: N                       DATE INSERTED:                  GLOBAL ISSUE/BEST PRACTICE:  Analgesia: N/A  Sedation: N/A  HOB elevation: yes  Stress ulcer prophylaxis: protonix   VTE prophylaxis: SCD/Heparin SQ/Lovenox SQ  Glycemic control: N/A  Nutrition: NPO/Diet/TF CC:  Patient is a 59y old  Female who presents with a chief complaint of pneumothorax, colitis, UTI (08 Jan 2020 17:07)      HPI/BRIEF HOSPITAL COURSE:   59F with PMH of ILD (unknown type) hx of pneumothorax (on 2L NC at home), hx pulmonary nodules, Meniere's disease, Non-Hodgkin's lymphoma (SCD/XRT/chemo at [facility] 2003), hx of CVA (3/2019 - residual L sided weakness, unable to use her L arm), not on ASA or Plavix due to GIB, Seizure disorder, tachycardia, MVP, hx of chronic SDH, presents with weakness, weight loss, nausea, and diarrhea admitted for R pneumothorax now with new fever and worsening PTX.     Events last 24 hours:   RRT called for lethargy, hypoxia, with increased WOB and sinus tachycardia with HR in 120-130's.  Pt placed on 100% NRB, with improvement in SpO2 to 100%. Pt seemingly lethargic, and remained tachycardic, pt with coarse rhonchi throughout.  Pt      PAST MEDICAL & SURGICAL HISTORY:  Interstitial lung disease: on home o2 prn  NHL (non-Hodgkin's lymphoma): Agem 43 sp chemo/rt/stem cell  Transient cerebral ischemia, unspecified type  Mitral prolapse  History of tonsillectomy  History of appendectomy    Allergies  IV Contrast (Anaphylaxis)  shellfish. (Anaphylaxis)    Intolerances      FAMILY HISTORY:  Family history of stroke  Family history of breast cancer: Mother      Review of Systems:  CONSTITUTIONAL: No fever, chills, + fatigue  EYES: No visual disturbances  ENMT:  No sinus or throat pain  NECK: No pain  RESPIRATORY: No cough, wheezing, chills or hemoptysis; + shortness of breath  CARDIOVASCULAR: No chest pain, palpitations, dizziness, or leg swelling  GASTROINTESTINAL: No abdominal pain. No nausea, vomiting, or hematemesis; No diarrhea or constipation. No melena   GENITOURINARY: No dysuria, frequency, hematuria, or incontinence  NEUROLOGICAL: No headaches, memory loss, loss of strength, numbness, or tremors  SKIN: No itching, burning, rashes, or lesions   MUSCULOSKELETAL:  No muscle, back, or extremity pain  PSYCHIATRIC: No difficulty sleeping      Medications:  piperacillin/tazobactam IVPB.. 3.375 Gram(s) IV Intermittent every 8 hours  propranolol 20 milliGRAM(s) Oral three times a day  ALBUTerol    0.083% 2.5 milliGRAM(s) Nebulizer every 6 hours PRN  tiotropium 18 MICROgram(s) Capsule 1 Capsule(s) Inhalation daily  acetaminophen   Tablet .. 650 milliGRAM(s) Oral every 6 hours PRN  ondansetron    Tablet 4 milliGRAM(s) Oral every 12 hours PRN  ondansetron Injectable 4 milliGRAM(s) IV Push once  OXcarbazepine 300 milliGRAM(s) Oral two times a day  enoxaparin Injectable 40 milliGRAM(s) SubCutaneous daily  pantoprazole    Tablet 40milliGRAM(s) Oral before breakfast  simethicone 80 milliGRAM(s) Chew two times a day PRN  sucralfate 1 Gram(s) Oral two times a day  lactated ringers. 1000 milliLiter(s) IV Continuous <Continuous>  influenza   Vaccine 0.5 milliLiter(s) IntraMuscular once  hydrocortisone 2.5% Rectal Cream 1 Application(s) Rectal two times a day  hydrocortisone hemorrhoidal Suppository 1 Suppository(s) Rectal two times a day  lidocaine 2% Gel 1 Application(s) Topical three times a day  witch hazel Pads 1 Application(s) Topical three times a day  zinc oxide 20% Ointment 1 Application(s) Topical two times a day  lactobacillus acidophilus 1 Tablet(s) Oral three times a day with meals    ICU Vital Signs Last 24 Hrs  T(C): 39.1 (08 Jan 2020 20:46), Max: 39.4 (08 Jan 2020 08:33)  T(F): 102.3 (08 Jan 2020 20:46), Max: 103 (08 Jan 2020 08:33)  HR: 101 (08 Jan 2020 22:00) (100 - 134)  BP: 92/59 (08 Jan 2020 22:00) (92/59 - 168/97)  BP(mean): 70 (08 Jan 2020 22:00) (70 - 98)  ABP: --  ABP(mean): --  RR: 32 (08 Jan 2020 22:00) (18 - 39)  SpO2: 100% (08 Jan 2020 22:00) (93% - 100%)    Vital Signs Last 24 Hrs  T(C): 39.1 (08 Jan 2020 20:46), Max: 39.4 (08 Jan 2020 08:33)  T(F): 102.3 (08 Jan 2020 20:46), Max: 103 (08 Jan 2020 08:33)  HR: 101 (08 Jan 2020 22:00) (100 - 134)  BP: 92/59 (08 Jan 2020 22:00) (92/59 - 168/97)  BP(mean): 70 (08 Jan 2020 22:00) (70 - 98)  RR: 32 (08 Jan 2020 22:00) (18 - 39)  SpO2: 100% (08 Jan 2020 22:00) (93% - 100%)        I&O's Detail    07 Jan 2020 07:01  -  08 Jan 2020 07:00  --------------------------------------------------------  IN:    Oral Fluid: 620 mL  Total IN: 620 mL    OUT:    Voided: 220 mL  Total OUT: 220 mL    Total NET: 400 mL      08 Jan 2020 07:01  -  08 Jan 2020 23:12  --------------------------------------------------------  IN:    lactated ringers.: 320 mL    Oral Fluid: 100 mL  Total IN: 420 mL    OUT:    Voided: 400 mL  Total OUT: 400 mL    Total NET: 20 mL            LABS:                        11.5   11.07 )-----------( 542      ( 08 Jan 2020 06:39 )             35.7     01-08    134<L>  |  95<L>  |  15  ----------------------------<  132<H>  4.1   |  34<H>  |  0.49<L>    Ca    8.9      08 Jan 2020 06:39            CAPILLARY BLOOD GLUCOSE      POCT Blood Glucose.: 153 mg/dL (08 Jan 2020 19:06)        CULTURES:    piperacillin/tazobactam IVPB.. 3.375 Gram(s) IV Intermittent every 8 hours      Physical Examination:  General: No acute distress.  Alert, oriented, interactive, nonfocal  NEURO: A&O X3, motor function 5/5 BL UE/LE  HEENT: Pupils equal, reactive to light.  Symmetric.  PULM: CTA BL, no significant sputum production, no wheezes, rales, rhonchi  CVS: Regular rate and rhythm, no murmurs, rubs, or gallops  ABD: Soft, nondistended, nontender, normoactive bowel sounds, no masses  EXT: No edema, nontender  SKIN: Warm and well perfused, no rashes noted      EKG: ***    RADIOLOGY: ***    POCUS:          LUNG:               (+/-) A-line  (+/-) B-line predominantly anteriorly              (+/-) Effusions, (+/-) infiltrate, (+/-) atelectasis, (+/-) B-lines at bases, (+/-) curtain's sign               (+/-)  Lung sliding          CARDIAC:               LV contractility/EF WNL, (+/-)LVH              (+/-) atrial enlargement               parasternal short view concentric w/o wall motion abnormality              (+/-) signs of RV strain, (+/-) septal flattening/D'ing, (+/-) Abreu's sign               (+/-) pericardial effusion               (+/-) valvular dz or notable vegetations               5 chamber VTI ***              Subcostal view IVC ***cm             ABDOMEN:              (+/-) abdominal ascites              (+/-) hydronephrosis          DVT STUDY: (+/-) noted thrombus [X] common femoral [X] deep femoral [X] popliteal       CENTRAL LINE: N          DATE INSERTED:                  LUKE: N                        DATE INSERTED:                  A-LINE: N                       DATE INSERTED:                  GLOBAL ISSUE/BEST PRACTICE:  Analgesia: N/A  Sedation: N/A  HOB elevation: yes  Stress ulcer prophylaxis: protonix   VTE prophylaxis: SCD/Heparin SQ/Lovenox SQ  Glycemic control: N/A  Nutrition: NPO/Diet/TF CC:  Patient is a 59y old  Female who presents with a chief complaint of pneumothorax, colitis, UTI (08 Jan 2020 17:07)      HPI/BRIEF HOSPITAL COURSE:   59F with PMH of ILD (unknown type) seen by Maldonado Avila (Dr. Hazel) hx of pneumothorax (on 2L NC at home), hx pulmonary nodules, Meniere's disease, Non-Hodgkin's lymphoma (SCD/XRT/chemo at [facility] 2003), hx of CVA (3/2019 - residual L sided weakness, unable to use her L arm), not on ASA or Plavix due to GIB, Seizure disorder, tachycardia, MVP, hx of chronic SDH, presents with weakness, weight loss, nausea, and diarrhea admitted for R pneumothorax now with new fever and worsening PTX.     Events last 24 hours:   RRT called for lethargy, hypoxia, with increased WOB and sinus tachycardia with HR in 120-130's.  Pt placed on 100% NRB, with improvement in SpO2 to 100%. Pt seemingly lethargic, and remained tachycardic, pt with coarse rhonchi throughout.  CXR with diffuse intersitial lung marking and potential superimposed PNA, cannot conclude if PTX is worsening. Pt sent for a CT Chest and placed on HFNC and transferred to the ICU for acute hypoxic respiratory failure, moderate PTX, AMS/lethargy, advanced interstitial lung disease, severe sepsis with likely superimposed PNA     PAST MEDICAL & SURGICAL HISTORY:  Interstitial lung disease: on home o2 prn  NHL (non-Hodgkin's lymphoma): Agem 43 sp chemo/rt/stem cell  Transient cerebral ischemia, unspecified type  Mitral prolapse  History of tonsillectomy  History of appendectomy    Allergies  IV Contrast (Anaphylaxis)  shellfish. (Anaphylaxis)    Intolerances      FAMILY HISTORY:  Family history of stroke  Family history of breast cancer: Mother      Review of Systems:  CONSTITUTIONAL: No fever, chills, + fatigue  EYES: No visual disturbances  ENMT:  No sinus or throat pain  NECK: No pain  RESPIRATORY: No cough, wheezing, chills or hemoptysis; + shortness of breath  CARDIOVASCULAR: No chest pain, palpitations, dizziness, or leg swelling  GASTROINTESTINAL: No abdominal pain. No nausea, vomiting, or hematemesis; No diarrhea or constipation. No melena   GENITOURINARY: No dysuria, frequency, hematuria, or incontinence  NEUROLOGICAL: No headaches, memory loss, loss of strength, numbness, or tremors  SKIN: No itching, burning, rashes, or lesions   MUSCULOSKELETAL:  No muscle, back, or extremity pain  PSYCHIATRIC: No difficulty sleeping      Medications:  piperacillin/tazobactam IVPB.. 3.375 Gram(s) IV Intermittent every 8 hours  propranolol 20 milliGRAM(s) Oral three times a day  ALBUTerol    0.083% 2.5 milliGRAM(s) Nebulizer every 6 hours PRN  tiotropium 18 MICROgram(s) Capsule 1 Capsule(s) Inhalation daily  acetaminophen   Tablet .. 650 milliGRAM(s) Oral every 6 hours PRN  ondansetron    Tablet 4 milliGRAM(s) Oral every 12 hours PRN  ondansetron Injectable 4 milliGRAM(s) IV Push once  OXcarbazepine 300 milliGRAM(s) Oral two times a day  enoxaparin Injectable 40 milliGRAM(s) SubCutaneous daily  pantoprazole    Tablet 40milliGRAM(s) Oral before breakfast  simethicone 80 milliGRAM(s) Chew two times a day PRN  sucralfate 1 Gram(s) Oral two times a day  lactated ringers. 1000 milliLiter(s) IV Continuous <Continuous>  influenza   Vaccine 0.5 milliLiter(s) IntraMuscular once  hydrocortisone 2.5% Rectal Cream 1 Application(s) Rectal two times a day  hydrocortisone hemorrhoidal Suppository 1 Suppository(s) Rectal two times a day  lidocaine 2% Gel 1 Application(s) Topical three times a day  witch hazel Pads 1 Application(s) Topical three times a day  zinc oxide 20% Ointment 1 Application(s) Topical two times a day  lactobacillus acidophilus 1 Tablet(s) Oral three times a day with meals    ICU Vital Signs Last 24 Hrs  T(C): 39.1 (08 Jan 2020 20:46), Max: 39.4 (08 Jan 2020 08:33)  T(F): 102.3 (08 Jan 2020 20:46), Max: 103 (08 Jan 2020 08:33)  HR: 101 (08 Jan 2020 22:00) (100 - 134)  BP: 92/59 (08 Jan 2020 22:00) (92/59 - 168/97)  BP(mean): 70 (08 Jan 2020 22:00) (70 - 98)  ABP: --  ABP(mean): --  RR: 32 (08 Jan 2020 22:00) (18 - 39)  SpO2: 100% (08 Jan 2020 22:00) (93% - 100%)    Vital Signs Last 24 Hrs  T(C): 39.1 (08 Jan 2020 20:46), Max: 39.4 (08 Jan 2020 08:33)  T(F): 102.3 (08 Jan 2020 20:46), Max: 103 (08 Jan 2020 08:33)  HR: 101 (08 Jan 2020 22:00) (100 - 134)  BP: 92/59 (08 Jan 2020 22:00) (92/59 - 168/97)  BP(mean): 70 (08 Jan 2020 22:00) (70 - 98)  RR: 32 (08 Jan 2020 22:00) (18 - 39)  SpO2: 100% (08 Jan 2020 22:00) (93% - 100%)        I&O's Detail    07 Jan 2020 07:01  -  08 Jan 2020 07:00  --------------------------------------------------------  IN:    Oral Fluid: 620 mL  Total IN: 620 mL    OUT:    Voided: 220 mL  Total OUT: 220 mL    Total NET: 400 mL      08 Jan 2020 07:01  -  08 Jan 2020 23:12  --------------------------------------------------------  IN:    lactated ringers.: 320 mL    Oral Fluid: 100 mL  Total IN: 420 mL    OUT:    Voided: 400 mL  Total OUT: 400 mL    Total NET: 20 mL            LABS:                        11.5   11.07 )-----------( 542      ( 08 Jan 2020 06:39 )             35.7     01-08    134<L>  |  95<L>  |  15  ----------------------------<  132<H>  4.1   |  34<H>  |  0.49<L>    Ca    8.9      08 Jan 2020 06:39            CAPILLARY BLOOD GLUCOSE      POCT Blood Glucose.: 153 mg/dL (08 Jan 2020 19:06)        CULTURES:    piperacillin/tazobactam IVPB.. 3.375 Gram(s) IV Intermittent every 8 hours      Physical Examination:  General: No acute distress.  lethargic arousable, oriented, interactive, nonfocal  NEURO: arousable lehtagric oriented , motor function 5/5 BL UE/LE  HEENT: Pupils equal, reactive to light.  Symmetric.  PULM: diffuse rhonchi throughout, no wheezes, rales, rhonchi  CVS: tachycardia and regular rhythm, no murmurs, rubs, or gallops  ABD: Soft, nondistended, nontender, normoactive bowel sounds, no masses  EXT: No edema, nontender  SKIN: Warm and well perfused, no rashes noted    EKG: Sinus Tachycardia -130    RADIOLOGY: < from: CT Chest No Cont (01.08.20 @ 20:32) >  IMPRESSION: Stable moderate-sized right pneumothorax.    Worsening confluent groundglass opacities and bibasilar consolidation superimposed on diffuse subpleural reticular opacities. Findings may represent acute pneumonia superimposed on top of underlying interstitial lung disease.    < end of copied text >      POCUS:          LUNG:               Mixed A/B lines anteriorly, predominantly a lines                (-) Effusions, (+) infiltrate, (+) atelectasis, (-) B-lines at bases, (+) curtain's sign               (?)  Lung sliding          CARDIAC:               LV contractility/EF WNL, (+/-)LVH              (-) atrial enlargement               parasternal short view concentric w/o wall motion abnormality              (-) signs of RV strain,              (-) pericardial effusion               (-) valvular dz or notable vegetations               Subcostal view IVC 1.2 cm             CENTRAL LINE: N          DATE INSERTED:                  LUKE: N                        DATE INSERTED:                  A-LINE: N                       DATE INSERTED:                  GLOBAL ISSUE/BEST PRACTICE:  Analgesia: N/A  Sedation: N/A  HOB elevation: yes  Stress ulcer prophylaxis: protonix   VTE prophylaxis: Lovenox SQ  Glycemic control: N/A  Nutrition: NPO

## 2020-01-08 NOTE — PROGRESS NOTE ADULT - PROBLEM SELECTOR PLAN 1
new fever, Tmax 103, unclear etiology at this time, associated with cough  - s/p 1 dose Zosyn  - differential includes viral respiratory, hospital acquired PNA, UTI.  Blood cultures pending.  WBC mildly elevated.  New chest xray shows worsened pleural effusion and pneumothorax on right side, RVP negative, flu negative. denies burning urination/suprapubic pain.    - will continue with Zosyn for hospital acquired pneumonia and reassess clinically new fever, Tmax 103, unclear etiology at this time, associated with cough  - s/p 1 dose Zosyn  - differential includes viral respiratory, hospital acquired PNA, UTI.  Blood cultures pending.  WBC mildly elevated.  New chest xray shows worsened pleural effusion and pneumothorax on right side, RVP negative, flu negative. denies burning urination/suprapubic pain.    - will continue with Zosyn for hospital acquired pneumonia and reassess clinically  - f/u MRSA nares if pos will start on Vanc  - started on maintenance fluids for low SBPs and patient not eating/drinking. new fever, Tmax 103, unclear etiology at this time, associated with cough  - s/p 1 dose Zosyn  - differential includes viral respiratory, hospital acquired PNA, UTI, ?GI source.  Blood cultures pending.  WBC mildly elevated.  New chest xray shows worsened pleural effusion and pneumothorax on right side, RVP negative, flu negative. denies burning urination/suprapubic pain.    - will continue with Zosyn, CT abd/pelvis with oral contrast patient is allergic to IV, and reassess clinically  - started on maintenance fluids for low SBPs and patient not eating/drinking. new fever, Tmax 103, unclear etiology at this time, associated with ? cough but pt is limited historian and difficult to rely on her reported history  - s/p 1 dose Zosyn  - differential includes viral respiratory, hospital acquired PNA, UTI, ?GI source.  Blood cultures pending.  WBC mildly elevated.  New chest xray shows worsened pleural effusion and pneumothorax on right side, RVP negative, flu negative. denies burning urination/suprapubic pain  - will continue with Zosyn, CT abd/pelvis with oral contrast patient is allergic to IV, and reassess clinically (pt shannonchaz refused oral contrast prior to undergoing to the study)  - started on maintenance fluids for low SBPs and patient not eating/drinking.  - will likely need ID input tomorrow (previously saw Kaitlin)

## 2020-01-08 NOTE — PROGRESS NOTE ADULT - ATTENDING COMMENTS
I personally conducted a physical examination of the patient. I personally gathered the patient's history. I edited the above listed findings which were prepared by the listed resident physician. I personally discussed the plan of care with the patient. The questions and concerns were addressed to the best of my ability. The patient is in agreement with the listed treatment plan.     - persistent fever. CT A/P essentially unremarkable. CXR showing persistent PTX (? worsening). lactate wnl. BP's have been low and started on IVF today. not septic.   - I personally reviewed the patient's vital signs, labs, microbiology data, radiographic findings and consultant recommendations with the residency team

## 2020-01-08 NOTE — CHART NOTE - NSCHARTNOTEFT_GEN_A_CORE
Resident Rapid Response Followup Note    Patient is a 59y old  Female who presents with a chief complaint of pneumothorax, colitis, UTI (08 Jan 2020 23:12)      Rapid response was called at 19:01 on this 59y Female patient for sob and desaturation.    Patient was seen and examined at the bedside for rapid response followup. Patient states that she feels much better, now complaining of a mild headache. SOB improved. Has no other acute complaints.    Vital Signs Last 24 Hrs  T(C): 37.2 (09 Jan 2020 00:03), Max: 39.4 (08 Jan 2020 08:33)  T(F): 98.9 (09 Jan 2020 00:03), Max: 103 (08 Jan 2020 08:33)  HR: 69 (09 Jan 2020 02:00) (67 - 134)  BP: 699/57 (09 Jan 2020 02:00) (60/34 - 699/57)  BP(mean): 71 (09 Jan 2020 02:00) (42 - 98)  RR: 23 (09 Jan 2020 02:00) (18 - 39)  SpO2: 100% (09 Jan 2020 02:00) (93% - 100%)    Physical Exam:  General: Frail elderly female in no acute distress  HEENT: NCAT, EOMI bl  Neurology: A&Ox2-3, nonfocal  Respiratory: CTA B/L  CV: RRR, S1/S2 present, no murmurs, rubs, or gallops  Abdominal: Soft, nontender, non-distended  Extremities: No C/C/E, peripheral pulses present  Skin: warm, dry    LABS:                        11.5   11.07 )-----------( 542      ( 08 Jan 2020 06:39 )             35.7     08 Jan 2020 06:39    134    |  95     |  15     ----------------------------<  132    4.1     |  34     |  0.49     Ca    8.9        08 Jan 2020 06:39          CAPILLARY BLOOD GLUCOSE      POCT Blood Glucose.: 153 mg/dL (08 Jan 2020 19:06)  POCT Blood Glucose.: 100 mg/dL (08 Jan 2020 16:35)        Assessment/Plan:  59F with PMH of interstitial lung disease hx of pneumothorax (on 2L NC at home), hx pulmonary nodules, Meniere's disease, Non-Hodgkin's lymphoma (SCD/XRT/chemo at [facility] 2003), hx of CVA (3/2019 - residual L sided weakness, unable to use her L arm), not on ASA or Plavix due to GIB, Seizure disorder, tachycardia, MVP, hx of chronic SDH, presents with weakness, weight loss, nausea, and diarrhea admitted for R pneumothorax now with new fever and worsening PTX. Rapid response called for sob and desatting. Transferred to ICU.  -patient on HFNC  -CT noncontrast showing unchanged pneumothorax, findings suspicious for acute pneumonia superimposed on interstitial lung disease  -other care per ICU  -Will continue to follow, RN to call if any changes.      -Will continue to follow, RN to call if any changes. Resident Rapid Response Followup Note    Patient is a 59y old  Female who presents with a chief complaint of pneumothorax, colitis, UTI (08 Jan 2020 23:12)    Rapid response was called at 19:01 on this 59y Female patient for SOB and desaturation.    Patient was seen and examined at the bedside in ICU for rapid response followup. Patient is on HFNC, saturating well. She states that she feels much better, now complaining of a mild headache. SOB improved. Has no other acute complaints.    Vital Signs at 21:00: , /81, HR 36, SpO2 98% on HFNC    Physical Exam:  General: Frail elderly female in no acute distress  HEENT: NCAT, EOMI bl  Neurology: A&Ox2-3, nonfocal  Respiratory: CTA B/L, on HFNC  CV: RRR, S1/S2 present, no murmurs, rubs, or gallops  Abdominal: Soft, nontender, non-distended  Extremities: No C/C/E, peripheral pulses present  Skin: Warm, dry    LABS:                        11.5   11.07 )-----------( 542      ( 08 Jan 2020 06:39 )             35.7     08 Jan 2020 06:39    134    |  95     |  15     ----------------------------<  132    4.1     |  34     |  0.49     Ca    8.9        08 Jan 2020 06:39      CAPILLARY BLOOD GLUCOSE  POCT Blood Glucose.: 153 mg/dL (08 Jan 2020 19:06)  POCT Blood Glucose.: 100 mg/dL (08 Jan 2020 16:35)      Assessment/Plan:  59F with PMH of interstitial lung disease hx of pneumothorax (on 2L NC at home), hx pulmonary nodules, Meniere's disease, Non-Hodgkin's [place] [facility] [place] hx of CVA (3/2019 - residual L sided weakness, unable to use her L arm), not on ASA or Plavix due to GIB, Seizure disorder, tachycardia, MVP, hx of chronic SDH, presents with weakness, weight loss, nausea, and diarrhea admitted for R pneumothorax now with new fever and worsening PTX. Rapid response called for SOB and desatting. Transferred to ICU, now stable on HFNC.    -Patient is stable and saturating well on HFNC  -CT noncontrast showing unchanged pneumothorax, findings suspicious for acute pneumonia superimposed on interstitial lung disease  -Continue IV Zosyn for suspected PNA  -Rest of management per ICU and primary team  -Will continue to follow, RN to call if any changes.

## 2020-01-08 NOTE — CHART NOTE - NSCHARTNOTEFT_GEN_A_CORE
Called by RN for tachycardia and anxiety. Patient seen and evaluated at bedside. Patient is a poor historian. Patient is slightly confused and stating "I need to go home, I need to go home." Admits anxiety. States that she would like something for the anxiety. Patient attempted to be calmed down, but continues to be fixated on certain phrases. Patient's  and daughter were called and multiple attempts were made to calm the patient but she continues to be anxious. Patient has a history of tachycardia which she takes propranolol TID for; however, heart rate is now in the 130s-140s sustaining for the past hour. Tele strip reviewed. Appears to be sinus, but HR trends have been in the 90s-100s since 10PM and only recently have been uptrending.    Vital Signs Last 24 Hrs  T(C): 36.8 (08 Jan 2020 04:41), Max: 38.4 (07 Jan 2020 16:00)  T(F): 98.3 (08 Jan 2020 04:41), Max: 101.1 (07 Jan 2020 16:00)  HR: 131 (08 Jan 2020 04:41) (76 - 131)  BP: 168/97 (08 Jan 2020 04:41) (94/67 - 168/97)  BP(mean): --  RR: 19 (08 Jan 2020 04:41) (18 - 19)  SpO2: 93% (08 Jan 2020 04:41) (92% - 99%)    Physical Exam:  General: Frail woman, anxious appearing, tachypneic  HEENT: NCAT, EOMI bl  Neurology: A&Ox1-2 (able to be oriented to person and place)  Skin: warm, dry, normal color    A/P:  59F with PMH of interstitial lung disease hx of pneumothorax (on 2L NC at home), hx pulmonary nodules, Meniere's disease, Non-Hodgkin's lymphoma (SCD/XRT/chemo at [facility] 2003), hx of CVA (3/2019 - residual L sided weakness, unable to use her L arm), not on ASA or Plavix due to GIB, Seizure disorder, tachycardia, MVP, hx of chronic SDH, presents with weakness, weight loss, nausea, and diarrhea admitted for R pneumothorax now stable on Chest xray. Called by RN for tachycardia and anxiety, patient appears very anxious.  -propranol AM dose given, but HR continuing to sustain  -Xanax 0.25 mg PO ordered, patient agreeable to receiving medications to reduce anxiety, does not appear drug seeking  -patient has a history of tachycardia, but HRs now sustaining in 140s so will check an EKG  -will continue to monitor, RN to call if any changes Called by RN for tachycardia and anxiety. Patient seen and evaluated at bedside. Patient is a poor historian. Patient is slightly confused and stating "I need to go home, I need to go home." Admits anxiety. States that she would like something for the anxiety. Patient attempted to be calmed down, but continues to be fixated on certain phrases. Patient's  and daughter were called and multiple attempts were made to calm the patient but she continues to be anxious. Patient has a history of tachycardia which she takes propranolol TID for; however, heart rate is now in the 130s-140s sustaining for the past hour. Tele strip reviewed. Appears to be sinus, but HR trends have been in the 90s-100s since 10PM and only recently have been uptrending.    Vital Signs Last 24 Hrs  T(C): 36.8 (08 Jan 2020 04:41), Max: 38.4 (07 Jan 2020 16:00)  T(F): 98.3 (08 Jan 2020 04:41), Max: 101.1 (07 Jan 2020 16:00)  HR: 131 (08 Jan 2020 04:41) (76 - 131)  BP: 168/97 (08 Jan 2020 04:41) (94/67 - 168/97)  BP(mean): --  RR: 19 (08 Jan 2020 04:41) (18 - 19)  SpO2: 93% (08 Jan 2020 04:41) (92% - 99%)    Physical Exam:  General: Frail woman, anxious appearing, tachypneic  HEENT: NCAT, EOMI bl  Neurology: A&Ox1-2 (able to be oriented to person and place)  Skin: warm, dry, normal color    A/P:  59F with PMH of interstitial lung disease hx of pneumothorax (on 2L NC at home), hx pulmonary nodules, Meniere's disease, Non-Hodgkin's lymphoma (SCD/XRT/chemo at [facility] 2003), hx of CVA (3/2019 - residual L sided weakness, unable to use her L arm), not on ASA or Plavix due to GIB, Seizure disorder, tachycardia, MVP, hx of chronic SDH, presents with weakness, weight loss, nausea, and diarrhea admitted for R pneumothorax now stable on Chest xray. Called by RN for tachycardia and anxiety, patient appears very anxious.  -propranol AM dose given, but HR continuing to sustain  -Xanax 0.25 mg PO ordered, patient agreeable to receiving medications to reduce anxiety, does not appear drug seeking, low risk for paradoxical reaction given young age  -patient has a history of tachycardia, but HRs now sustaining in 140s so will check an EKG  -will continue to monitor, RN to call if any changes Called by RN for tachycardia and anxiety. Patient seen and evaluated at bedside. Patient is a poor historian. Patient is slightly confused and stating "I need to go home, I need to go home." Admits anxiety. States that she would like something for the anxiety. Patient attempted to be calmed down, but continues to be fixated on certain phrases. Patient's  and daughter were called and multiple attempts were made to calm the patient but she continues to be anxious. Patient has a history of tachycardia which she takes propranolol TID for; however, heart rate is now in the 130s-140s sustaining for the past hour. Tele strip reviewed. Appears to be sinus, but HR trends have been in the 90s-100s since 10PM and only recently have been uptrending.    Vital Signs Last 24 Hrs  T(C): 36.8 (08 Jan 2020 04:41), Max: 38.4 (07 Jan 2020 16:00)  T(F): 98.3 (08 Jan 2020 04:41), Max: 101.1 (07 Jan 2020 16:00)  HR: 131 (08 Jan 2020 04:41) (76 - 131)  BP: 168/97 (08 Jan 2020 04:41) (94/67 - 168/97)  BP(mean): --  RR: 19 (08 Jan 2020 04:41) (18 - 19)  SpO2: 93% (08 Jan 2020 04:41) (92% - 99%)    Physical Exam:  General: Frail woman, anxious appearing, tachypneic  HEENT: NCAT, EOMI bl  Neurology: A&Ox1-2 (able to be oriented to person and place)  Skin: warm, dry, normal color    A/P:  59F with PMH of interstitial lung disease hx of pneumothorax (on 2L NC at home), hx pulmonary nodules, Meniere's disease, Non-Hodgkin's lymphoma (SCD/XRT/chemo at [facility] 2003), hx of CVA (3/2019 - residual L sided weakness, unable to use her L arm), not on ASA or Plavix due to GIB, Seizure disorder, tachycardia, MVP, hx of chronic SDH, presents with weakness, weight loss, nausea, and diarrhea admitted for R pneumothorax now stable on Chest xray. Called by RN for tachycardia and anxiety, patient appears very anxious but anxiety starting to pass  -propranol AM dose given, HR was sustaining but now finally dropping  -patient's anxiety improved with some time  -patient has a history of tachycardia, but HRs now sustaining in 140s so EKG ordered, showing NSR, HR now trending down  -will continue to monitor, RN to call if any changes Called by RN for tachycardia and anxiety. Patient seen and evaluated at bedside. Patient is a poor historian. Patient is slightly confused and stating "I need to go home, I need to go home." Admits anxiety. States that she would like something for the anxiety. Patient attempted to be calmed down, but continues to be fixated on certain phrases. Patient's  and daughter were called and multiple attempts were made to calm the patient but she continues to be anxious. Patient has a history of tachycardia which she takes propranolol TID for; however, heart rate is now in the 130s-140s sustaining for the past hour. Tele strip reviewed. Appears to be sinus, but HR trends have been in the 90s-100s since 10PM and only recently have been uptrending.    Vital Signs Last 24 Hrs  T(C): 36.8 (08 Jan 2020 04:41), Max: 38.4 (07 Jan 2020 16:00)  T(F): 98.3 (08 Jan 2020 04:41), Max: 101.1 (07 Jan 2020 16:00)  HR: 131 (08 Jan 2020 04:41) (76 - 131)  BP: 168/97 (08 Jan 2020 04:41) (94/67 - 168/97)  BP(mean): --  RR: 19 (08 Jan 2020 04:41) (18 - 19)  SpO2: 93% (08 Jan 2020 04:41) (92% - 99%)    Physical Exam:  General: Frail woman, anxious appearing, tachypneic  HEENT: NCAT, EOMI bl  Neurology: A&Ox1-2 (able to be oriented to person and place)  Skin: warm, dry, normal color    A/P:  59F with PMH of interstitial lung disease hx of pneumothorax (on 2L NC at home), hx pulmonary nodules, Meniere's disease, Non-Hodgkin's lymphoma (SCD/XRT/chemo at [facility] 2003), hx of CVA (3/2019 - residual L sided weakness, unable to use her L arm), not on ASA or Plavix due to GIB, Seizure disorder, tachycardia, MVP, hx of chronic SDH, presents with weakness, weight loss, nausea, and diarrhea admitted for R pneumothorax now stable on Chest xray. Called by RN for tachycardia likely 2/2 anxiety which is resolving with emotional support.    -patient has a history of tachycardia, but as HRs were sustaining in 140s a stat EKG ordered which showed sinus tach  -propranol AM dose given, HR was sustaining but began to come down s/p propranolol  -patient's anxiety improved with emotional support provided by staff/residents,  and daughter on the phone  -since patient's anxiety improved and family prefer that she not take an anxiolytic at the time, held off on giving anxiolytic  -will continue to monitor, RN to call if any changes

## 2020-01-09 DIAGNOSIS — J84.9 INTERSTITIAL PULMONARY DISEASE, UNSPECIFIED: ICD-10-CM

## 2020-01-09 DIAGNOSIS — J96.00 ACUTE RESPIRATORY FAILURE, UNSPECIFIED WHETHER WITH HYPOXIA OR HYPERCAPNIA: ICD-10-CM

## 2020-01-09 LAB
ALBUMIN SERPL ELPH-MCNC: 2 G/DL — LOW (ref 3.3–5)
ALP SERPL-CCNC: 81 U/L — SIGNIFICANT CHANGE UP (ref 40–120)
ALT FLD-CCNC: 19 U/L — SIGNIFICANT CHANGE UP (ref 12–78)
ANION GAP SERPL CALC-SCNC: 5 MMOL/L — SIGNIFICANT CHANGE UP (ref 5–17)
APPEARANCE UR: ABNORMAL
AST SERPL-CCNC: 25 U/L — SIGNIFICANT CHANGE UP (ref 15–37)
BASE EXCESS BLDA CALC-SCNC: 10.2 MMOL/L — HIGH (ref -2–2)
BASOPHILS # BLD AUTO: 0.03 K/UL — SIGNIFICANT CHANGE UP (ref 0–0.2)
BASOPHILS NFR BLD AUTO: 0.3 % — SIGNIFICANT CHANGE UP (ref 0–2)
BILIRUB DIRECT SERPL-MCNC: <.1 MG/DL — SIGNIFICANT CHANGE UP (ref 0.05–0.2)
BILIRUB INDIRECT FLD-MCNC: >0.1 MG/DL — LOW (ref 0.2–1)
BILIRUB SERPL-MCNC: 0.2 MG/DL — SIGNIFICANT CHANGE UP (ref 0.2–1.2)
BILIRUB UR-MCNC: NEGATIVE — SIGNIFICANT CHANGE UP
BLOOD GAS COMMENTS ARTERIAL: SIGNIFICANT CHANGE UP
BLOOD GAS COMMENTS ARTERIAL: SIGNIFICANT CHANGE UP
BUN SERPL-MCNC: 13 MG/DL — SIGNIFICANT CHANGE UP (ref 7–23)
CALCIUM SERPL-MCNC: 8.8 MG/DL — SIGNIFICANT CHANGE UP (ref 8.5–10.1)
CHLORIDE SERPL-SCNC: 96 MMOL/L — SIGNIFICANT CHANGE UP (ref 96–108)
CO2 SERPL-SCNC: 36 MMOL/L — HIGH (ref 22–31)
COLOR SPEC: YELLOW — SIGNIFICANT CHANGE UP
CREAT SERPL-MCNC: 0.63 MG/DL — SIGNIFICANT CHANGE UP (ref 0.5–1.3)
DIFF PNL FLD: ABNORMAL
EOSINOPHIL # BLD AUTO: 0.04 K/UL — SIGNIFICANT CHANGE UP (ref 0–0.5)
EOSINOPHIL NFR BLD AUTO: 0.4 % — SIGNIFICANT CHANGE UP (ref 0–6)
GLUCOSE SERPL-MCNC: 102 MG/DL — HIGH (ref 70–99)
GLUCOSE UR QL: NEGATIVE — SIGNIFICANT CHANGE UP
HCO3 BLDA-SCNC: 33 MMOL/L — HIGH (ref 23–27)
HCT VFR BLD CALC: 34.1 % — LOW (ref 34.5–45)
HGB BLD-MCNC: 10.5 G/DL — LOW (ref 11.5–15.5)
HOROWITZ INDEX BLDA+IHG-RTO: 40 — SIGNIFICANT CHANGE UP
IMM GRANULOCYTES NFR BLD AUTO: 0.3 % — SIGNIFICANT CHANGE UP (ref 0–1.5)
KETONES UR-MCNC: NEGATIVE — SIGNIFICANT CHANGE UP
LEUKOCYTE ESTERASE UR-ACNC: ABNORMAL
LYMPHOCYTES # BLD AUTO: 1.25 K/UL — SIGNIFICANT CHANGE UP (ref 1–3.3)
LYMPHOCYTES # BLD AUTO: 13.6 % — SIGNIFICANT CHANGE UP (ref 13–44)
MAGNESIUM SERPL-MCNC: 2.1 MG/DL — SIGNIFICANT CHANGE UP (ref 1.6–2.6)
MCHC RBC-ENTMCNC: 27.9 PG — SIGNIFICANT CHANGE UP (ref 27–34)
MCHC RBC-ENTMCNC: 30.8 GM/DL — LOW (ref 32–36)
MCV RBC AUTO: 90.5 FL — SIGNIFICANT CHANGE UP (ref 80–100)
MONOCYTES # BLD AUTO: 0.9 K/UL — SIGNIFICANT CHANGE UP (ref 0–0.9)
MONOCYTES NFR BLD AUTO: 9.8 % — SIGNIFICANT CHANGE UP (ref 2–14)
NEUTROPHILS # BLD AUTO: 6.91 K/UL — SIGNIFICANT CHANGE UP (ref 1.8–7.4)
NEUTROPHILS NFR BLD AUTO: 75.6 % — SIGNIFICANT CHANGE UP (ref 43–77)
NITRITE UR-MCNC: NEGATIVE — SIGNIFICANT CHANGE UP
NRBC # BLD: 0 /100 WBCS — SIGNIFICANT CHANGE UP (ref 0–0)
PCO2 BLDA: 56 MMHG — HIGH (ref 32–46)
PH BLDA: 7.42 — SIGNIFICANT CHANGE UP (ref 7.35–7.45)
PH UR: 5 — SIGNIFICANT CHANGE UP (ref 5–8)
PHOSPHATE SERPL-MCNC: 3.2 MG/DL — SIGNIFICANT CHANGE UP (ref 2.5–4.5)
PLATELET # BLD AUTO: 451 K/UL — HIGH (ref 150–400)
PO2 BLDA: 62 MMHG — LOW (ref 74–108)
POTASSIUM SERPL-MCNC: 3.8 MMOL/L — SIGNIFICANT CHANGE UP (ref 3.5–5.3)
POTASSIUM SERPL-SCNC: 3.8 MMOL/L — SIGNIFICANT CHANGE UP (ref 3.5–5.3)
PROT SERPL-MCNC: 6.1 G/DL — SIGNIFICANT CHANGE UP (ref 6–8.3)
PROT UR-MCNC: 25 MG/DL
RBC # BLD: 3.77 M/UL — LOW (ref 3.8–5.2)
RBC # FLD: 14.5 % — SIGNIFICANT CHANGE UP (ref 10.3–14.5)
SAO2 % BLDA: 91 % — LOW (ref 92–96)
SODIUM SERPL-SCNC: 137 MMOL/L — SIGNIFICANT CHANGE UP (ref 135–145)
SP GR SPEC: 1.01 — SIGNIFICANT CHANGE UP (ref 1.01–1.02)
UROBILINOGEN FLD QL: NEGATIVE — SIGNIFICANT CHANGE UP
WBC # BLD: 9.16 K/UL — SIGNIFICANT CHANGE UP (ref 3.8–10.5)
WBC # FLD AUTO: 9.16 K/UL — SIGNIFICANT CHANGE UP (ref 3.8–10.5)

## 2020-01-09 PROCEDURE — 99291 CRITICAL CARE FIRST HOUR: CPT

## 2020-01-09 PROCEDURE — 99233 SBSQ HOSP IP/OBS HIGH 50: CPT | Mod: GC

## 2020-01-09 PROCEDURE — 71045 X-RAY EXAM CHEST 1 VIEW: CPT | Mod: 26

## 2020-01-09 PROCEDURE — 93970 EXTREMITY STUDY: CPT | Mod: 26

## 2020-01-09 RX ORDER — DEXMEDETOMIDINE HYDROCHLORIDE IN 0.9% SODIUM CHLORIDE 4 UG/ML
0.3 INJECTION INTRAVENOUS
Qty: 200 | Refills: 0 | Status: DISCONTINUED | OUTPATIENT
Start: 2020-01-09 | End: 2020-01-17

## 2020-01-09 RX ORDER — SODIUM CHLORIDE 9 MG/ML
500 INJECTION, SOLUTION INTRAVENOUS ONCE
Refills: 0 | Status: COMPLETED | OUTPATIENT
Start: 2020-01-09 | End: 2020-01-09

## 2020-01-09 RX ORDER — ACETAMINOPHEN 500 MG
650 TABLET ORAL ONCE
Refills: 0 | Status: DISCONTINUED | OUTPATIENT
Start: 2020-01-09 | End: 2020-01-09

## 2020-01-09 RX ORDER — SODIUM CHLORIDE 9 MG/ML
3 INJECTION INTRAMUSCULAR; INTRAVENOUS; SUBCUTANEOUS EVERY 6 HOURS
Refills: 0 | Status: DISCONTINUED | OUTPATIENT
Start: 2020-01-09 | End: 2020-01-11

## 2020-01-09 RX ORDER — SIMETHICONE 80 MG/1
80 TABLET, CHEWABLE ORAL
Refills: 0 | Status: DISCONTINUED | OUTPATIENT
Start: 2020-01-09 | End: 2020-01-24

## 2020-01-09 RX ORDER — ACETYLCYSTEINE 200 MG/ML
4 VIAL (ML) MISCELLANEOUS THREE TIMES A DAY
Refills: 0 | Status: DISCONTINUED | OUTPATIENT
Start: 2020-01-09 | End: 2020-01-09

## 2020-01-09 RX ADMIN — SODIUM CHLORIDE 3 MILLILITER(S): 9 INJECTION INTRAMUSCULAR; INTRAVENOUS; SUBCUTANEOUS at 19:38

## 2020-01-09 RX ADMIN — OXCARBAZEPINE 300 MILLIGRAM(S): 300 TABLET, FILM COATED ORAL at 09:49

## 2020-01-09 RX ADMIN — Medication 1 APPLICATION(S): at 21:10

## 2020-01-09 RX ADMIN — Medication 1 SUPPOSITORY(S): at 05:23

## 2020-01-09 RX ADMIN — LIDOCAINE 1 APPLICATION(S): 4 CREAM TOPICAL at 21:11

## 2020-01-09 RX ADMIN — Medication 650 MILLIGRAM(S): at 19:30

## 2020-01-09 RX ADMIN — Medication 1 APPLICATION(S): at 10:33

## 2020-01-09 RX ADMIN — PIPERACILLIN AND TAZOBACTAM 25 GRAM(S): 4; .5 INJECTION, POWDER, LYOPHILIZED, FOR SOLUTION INTRAVENOUS at 05:23

## 2020-01-09 RX ADMIN — Medication 650 MILLIGRAM(S): at 19:00

## 2020-01-09 RX ADMIN — SODIUM CHLORIDE 1000 MILLILITER(S): 9 INJECTION, SOLUTION INTRAVENOUS at 00:18

## 2020-01-09 RX ADMIN — Medication 1 TABLET(S): at 07:58

## 2020-01-09 RX ADMIN — PIPERACILLIN AND TAZOBACTAM 25 GRAM(S): 4; .5 INJECTION, POWDER, LYOPHILIZED, FOR SOLUTION INTRAVENOUS at 21:09

## 2020-01-09 RX ADMIN — ENOXAPARIN SODIUM 40 MILLIGRAM(S): 100 INJECTION SUBCUTANEOUS at 13:01

## 2020-01-09 RX ADMIN — LIDOCAINE 1 APPLICATION(S): 4 CREAM TOPICAL at 13:04

## 2020-01-09 RX ADMIN — ZINC OXIDE 1 APPLICATION(S): 200 OINTMENT TOPICAL at 17:44

## 2020-01-09 RX ADMIN — LIDOCAINE 1 APPLICATION(S): 4 CREAM TOPICAL at 05:23

## 2020-01-09 RX ADMIN — PIPERACILLIN AND TAZOBACTAM 25 GRAM(S): 4; .5 INJECTION, POWDER, LYOPHILIZED, FOR SOLUTION INTRAVENOUS at 13:01

## 2020-01-09 RX ADMIN — Medication 1 GRAM(S): at 05:24

## 2020-01-09 RX ADMIN — AER TRAVELER 1 APPLICATION(S): 0.5 SOLUTION RECTAL; TOPICAL at 21:10

## 2020-01-09 RX ADMIN — Medication 1 TABLET(S): at 17:28

## 2020-01-09 RX ADMIN — Medication 1 TABLET(S): at 13:01

## 2020-01-09 RX ADMIN — ALBUTEROL 2.5 MILLIGRAM(S): 90 AEROSOL, METERED ORAL at 16:23

## 2020-01-09 RX ADMIN — Medication 650 MILLIGRAM(S): at 07:50

## 2020-01-09 RX ADMIN — PANTOPRAZOLE SODIUM 40 MILLIGRAM(S): 20 TABLET, DELAYED RELEASE ORAL at 05:24

## 2020-01-09 RX ADMIN — ZINC OXIDE 1 APPLICATION(S): 200 OINTMENT TOPICAL at 05:25

## 2020-01-09 RX ADMIN — OXCARBAZEPINE 300 MILLIGRAM(S): 300 TABLET, FILM COATED ORAL at 21:09

## 2020-01-09 RX ADMIN — Medication 1 SUPPOSITORY(S): at 17:28

## 2020-01-09 RX ADMIN — TIOTROPIUM BROMIDE 1 CAPSULE(S): 18 CAPSULE ORAL; RESPIRATORY (INHALATION) at 13:03

## 2020-01-09 RX ADMIN — DEXMEDETOMIDINE HYDROCHLORIDE IN 0.9% SODIUM CHLORIDE 4.08 MICROGRAM(S)/KG/HR: 4 INJECTION INTRAVENOUS at 20:04

## 2020-01-09 RX ADMIN — AER TRAVELER 1 APPLICATION(S): 0.5 SOLUTION RECTAL; TOPICAL at 13:04

## 2020-01-09 RX ADMIN — AER TRAVELER 1 APPLICATION(S): 0.5 SOLUTION RECTAL; TOPICAL at 05:24

## 2020-01-09 NOTE — PROGRESS NOTE ADULT - PROBLEM SELECTOR PLAN 1
on high flow NC  no tension PTX on CT chest repeat - ? worsening of PTX - however - to my review - looks similar to original CT on admission  pt has ILD - chronic lung disease - likely trapped Lung on right side -   will attempt to call Dr. Hazel - pt's Pulm MD in [facility] -  discussed with thoracic - re - follow up and current CT chest  consider Volume overload as a component of pt's Hypoxemic resp distress  I and O noted  will follow  cont ICU care for now  no indication for emergent Chest Tube insertion

## 2020-01-09 NOTE — PROGRESS NOTE ADULT - PROBLEM SELECTOR PLAN 2
moderate right pneumothorax  - following serial xray unchanged for several days but appears worsened vs new infiltrate/effusion on chest xray CT.  ICU management for PTX monitoring for now.   - continue to encourage incentive spirometry use  - patient has h/o ILD not on steroids, follows with Dr. Hazel [facility].  - out of bed as tolerated  - Cardiothoracic surgery, Dr. France consulted -daily CXR to monitor progress of PTX were done, now stabilized  - Pulmonology, Dr. Rashid consulted - continue Spiriva and duonebs

## 2020-01-09 NOTE — PROGRESS NOTE ADULT - ASSESSMENT
59F with PMH of ILD (unknown type, treated by Dr Hazel at [facility]), chronic hypoxemic respiratory failure and right sided pneumothorax (on 2L NC at home), pulmonary nodules, Meniere's disease, Non-Hodgkin's lymphoma (SCD/XRT/chemo at [facility] 2003), CVA (3/2019, residual L sided weakness, unable to use her L arm, not on ASA or Plavix due to GIB), Seizure disorder, tachycardia, MVP, chronic SDH. 12/26 pt p/w weakness, weight loss, nausea, and diarrhea. A/w UTI, colitis, R PTX.    1/8/20 @ 1901 RRT called for lethargy, hypoxia, with increased WOB and sinus tachycardia with HR in 120-130's.  Pt transferred to ICU for acute hypoxemic respiratory failure, unchanged R PTX, AMS/lethargy, advanced interstitial lung disease, severe sepsis with likely superimposed PNA.      NEURO:   - tylenol for fever PRN and mild pain prn   -Trileptal for seizure d/o  - Hx of CVA with left upper extremity paresis and overall left sided weakness. Continue LUE splint (2 hrs on/2 hrs off)    PULM:    - continue HFNC, titrate LPM/FiO2 to maintain O2 sat >90%  -placement of formal CT for R PTX d/w pt and family, consenting but requesting to hold off until tomorrow which was agreed to thought they also that if pt should deteriorate it will need to be done sooner. Dr France (thoracic sx) aware and to see pt in am. Pt's daughter, Eva, reports speaking to Dr Hazel who agrees with  placement of CT.  - Dr. Gay Colon is following. Dr. Hazel (outpatient ILD doctor from [facility]) notified of patient's status by Dr. Rashid.   - Consider steroids if patient's respiratory status worsens but caution as patient states she received steroids for her Non-hodgkin's lymphoma and experienced steroid induced delirium.   - Continue Albuterol nebs as needed, spiriva daily  -cannot rule out PE d/t hypoxemia and tachycardia, will get BLE duplex for now. Unable for CTA chest d/t iodine allergy and pt refusing premedication with benaadryl/steroids. If suspicion continues will consider empiric full dose anticoagulation    CV:   -Continue propanolol for tachycardia, hx of MVP   - f/u repeat TTE     GI:    - Upgrade diet to CLD and upgrade as tolerated.   - Treated with flagyl on this admission for stercolitis. Can continue carafate, bacid, simethicone, Proctosol,     :   - renal function stable. Replace lytes as needed. On LR @80cc/hr x24 hours.   - primafit recommended for urinary incontinence but pt refusing at this time     ID:   - Bcx NGTD (12/26 and 1/8)   - ID consulted 1/9, input appreciated  - continue zosyn for presumed PNA   - f/u urine legionella   - check MRSA/MSSA PCR    HEME/DVT PPX:   - Lovenox SQ DVT ppx     SKIN:  - no lines, no avendano     GOC/Advanced DIrectives:  - spoke with daughter regarding mom's wishes, no conversations have taken place. Should pt need intubation trach is very likely. Recommended discussing this with her parents. Recommend palliative care consult.

## 2020-01-09 NOTE — CONSULT NOTE ADULT - SUBJECTIVE AND OBJECTIVE BOX
HPI:  59F with PMH of interstitial lung disease hx of pneumothorax (on 2L NC at home), hx pulmonary nodules, Meniere's disease, Non-Hodgkin's lymphoma (SCD/XRT/chemo at [facility] 2003), hx of CVA (3/2019 - residual L sided weakness, unable to use her L arm), not on ASA or Plavix due to GIB, Seizure disorder, tachycardia, MVP, hx of chronic SDH, presented and was admitted 12/26 with with weakness, weight loss, nausea, and diarrhea for the past month. Initial concerns for possible intra-abdominal process versus UTI and treated with ceftriaxone and metronidazole but then observed off abx.      PT then had prolonged stay deferring dc and was observed abx until pt developed fever and AMS. Respiratory issues developed and pt started on Zosyn and ultimately transferred to ICU with some concerns that there may have been an aspiration event.    PAST MEDICAL & SURGICAL HISTORY:  Interstitial lung disease: on home o2 prn  NHL (non-Hodgkin's lymphoma): Agem 45 sp chemo/rt/stem cell  Transient cerebral ischemia, unspecified type  Mitral prolapse  History of tonsillectomy  History of appendectomy      Antimicrobials  piperacillin/tazobactam IVPB.. 3.375 Gram(s) IV Intermittent every 8 hours      Immunological  influenza   Vaccine 0.5 milliLiter(s) IntraMuscular once      Other  acetaminophen   Tablet .. 650 milliGRAM(s) Oral every 6 hours PRN  ALBUTerol    0.083% 2.5 milliGRAM(s) Nebulizer every 6 hours PRN  enoxaparin Injectable 40 milliGRAM(s) SubCutaneous daily  hydrocortisone 2.5% Rectal Cream 1 Application(s) Rectal two times a day  hydrocortisone hemorrhoidal Suppository 1 Suppository(s) Rectal two times a day  lactated ringers. 1000 milliLiter(s) IV Continuous <Continuous>  lactobacillus acidophilus 1 Tablet(s) Oral three times a day with meals  lidocaine 2% Gel 1 Application(s) Topical three times a day  ondansetron    Tablet 4 milliGRAM(s) Oral every 12 hours PRN  OXcarbazepine 300 milliGRAM(s) Oral two times a day  pantoprazole    Tablet 40 milliGRAM(s) Oral before breakfast  propranolol 20 milliGRAM(s) Oral three times a day  simethicone 80 milliGRAM(s) Chew two times a day PRN  sucralfate 1 Gram(s) Oral two times a day  tiotropium 18 MICROgram(s) Capsule 1 Capsule(s) Inhalation daily  witch hazel Pads 1 Application(s) Topical three times a day  zinc oxide 20% Ointment 1 Application(s) Topical two times a day      Allergies    IV Contrast (Anaphylaxis)  shellfish. (Anaphylaxis)    Intolerances      SOCIAL HISTORY:  Marital Status:   Occupation: retired, worked in sales  Lives with: daughter and     Needs assistance with ADLs, ambulates with assistance. Mainly wheelchair bound, but able to stand and walk short distances    Tobacco Usage: former smoker 1 ppd for 10 years, quit 30+ years ago  Alcohol Usage: denies     Advanced Directives: HCP -  Shashi (26 Dec 2019 21:14)      FAMILY HISTORY:  Family history of stroke  Family history of breast cancer: Mother      ROS:    EYES:  Negative  blurry vision or double vision  GASTROINTESTINAL:  Negative for nausea, vomiting, diarrhea  -otherwise negative except for subjective    Vital Signs Last 24 Hrs  T(C): 37.2 (09 Jan 2020 07:34), Max: 39.1 (08 Jan 2020 20:46)  T(F): 98.9 (09 Jan 2020 07:34), Max: 102.3 (08 Jan 2020 20:46)  HR: 82 (09 Jan 2020 10:00) (67 - 134)  BP: 86/47 (09 Jan 2020 09:00) (60/34 - 699/57)  BP(mean): 62 (09 Jan 2020 09:00) (42 - 111)  RR: 37 (09 Jan 2020 10:00) (18 - 39)  SpO2: 94% (09 Jan 2020 10:00) (92% - 100%)    PE:  WDWN in no distress  HEENT:  NC, PERRL, sclerae anicteric, conjunctivae clear, EOMI.  Sinuses nontender, no nasal exudate.  No buccal or pharyngeal lesions, erythema or exudate  Neck:  Supple, no adenopathy  Lungs:  No accessory muscle use, bilaterally with few scattered crackles on high flow oxygen  Cor:  RRR, S1, S2, no murmur appreciated  Abd:  Symmetric, normoactive BS.  Soft, nontender, no masses, guarding or rebound.  Liver and spleen not enlarged  Extrem:  No cyanosis or edema  Skin:  No rashes.  Neuro: grossly intact  Musc: moving all limbs freely, no focal deficits    LABS:                        10.5   9.16  )-----------( 451      ( 09 Jan 2020 05:38 )             34.1     Auto Neutrophil #: 9.73 K/uL (01.08.20 @ 06:39)    Auto Neutrophil #: 6.91 K/uL (01.09.20 @ 05:38)    Auto Eosinophil #: 0.02 K/uL (01.08.20 @ 06:39)    Auto Eosinophil #: 0.04 K/uL (01.09.20 @ 05:38)        WBC Count: 9.16 K/uL (01-09-20 @ 05:38)  WBC Count: 11.07 K/uL (01-08-20 @ 06:39)  WBC Count: 8.30 K/uL (01-04-20 @ 08:28)  WBC Count: 7.81 K/uL (01-03-20 @ 06:30)      01-09    137  |  96  |  13  ----------------------------<  102<H>  3.8   |  36<H>  |  0.63    Ca    8.8      09 Jan 2020 05:38  Phos  3.2     01-09  Mg     2.1     01-09    TPro  6.1  /  Alb  2.0<L>  /  TBili  0.2  /  DBili  <.10  /  AST  25  /  ALT  19  /  AlkPhos  81  01-09      Creatinine, Serum: 0.63 mg/dL (01-09-20 @ 05:38)  Creatinine, Serum: 0.49 mg/dL (01-08-20 @ 06:39)  Creatinine, Serum: 0.55 mg/dL (01-05-20 @ 08:01)  Creatinine, Serum: 0.43 mg/dL (01-04-20 @ 08:28)  Creatinine, Serum: 0.52 mg/dL (01-03-20 @ 06:30)    MICROBIOLOGY:      RADIOLOGY & ADDITIONAL STUDIES:    --< from: CT Chest No Cont (01.08.20 @ 20:32) >    EXAM:  CT CHEST                            PROCEDURE DATE:  01/08/2020          INTERPRETATION:  CLINICAL INFORMATION: Tachypnea. No right pneumothorax.    COMPARISON: CT chest 12/26/2019  CT abdomen pelvis 1/8/2020.    PROCEDURE:   CT of the Chestwas performed without intravenous contrast.  Sagittal and coronal reformats were performed.      FINDINGS:    CHEST:     LUNGS AND LARGE AIRWAYS: Patent central airways. Stable bilateral subpleural reticular opacities. Worsening confluent groundglassopacities and bibasilar consolidations.  PLEURA: Stable moderate-sized right pneumothorax. Trace left pleural effusion.  VESSELS: Ectatic thoracic descending aorta measures up to 3.8 cm in diameter..  HEART: Heart size is normal. Small pericardial effusion.  MEDIASTINUM AND IRMA: No shift of the mediastinum. No lymphadenopathy.  CHEST WALL AND LOWER NECK: Within normal limits.  VISUALIZED UPPER ABDOMEN: Please refer to the report of the dedicated CT abdomen pelvis performed earlier today.  BONES:Mild degenerative changes of the spine.    IMPRESSION: Stable moderate-sized right pneumothorax.    Worsening confluent groundglass opacities and bibasilar consolidation superimposed on diffuse subpleural reticular opacities. Findings may represent acute pneumonia superimposed on top of underlying interstitial lung disease.    < from: CT Abdomen and Pelvis No Cont (01.08.20 @ 18:05) >    EXAM:  CT ABDOMEN AND PELVIS                            PROCEDURE DATE:  01/08/2020          INTERPRETATION:  CT ABDOMEN AND PELVIS    CLINICAL INFORMATION: Colitis, fever, sepsis      COMPARISON: CT abdomen and pelvis 12/26/2019    PROCEDURE:   CTof the Abdomen and Pelvis was performed without intravenous contrast.  Intravenous contrast: None.  Oral contrast: None.  Sagittal and coronal reformats were performed.    FINDINGS:    LOWER CHEST: Stable moderate right pneumothorax. Worsening bibasilar opacities. Trace bilateral effusions.    LIVER: Stable cyst.  BILE DUCTS: Nondilated.  GALLBLADDER: Normal.  SPLEEN: Normal.  PANCREAS: Diffuse atrophy.  ADRENALS: Normal.  KIDNEYS/URETERS: No hydronephrosis or urinary tract calculi.    BLADDER: Underdistended limiting evaluation.  REPRODUCTIVE ORGANS: Calcified fibroid.    BOWEL: Improving proctitis. No bowel obstruction or other bowel inflammation.  PERITONEUM: No free air or ascites. No collection.  VESSELS:  Normal caliber aorta.  RETROPERITONEUM/LYMPH NODES: No adenopathy.    ABDOMINAL WALL: Normal.  BONES: No acute bony abnormality.    IMPRESSION:     Improving proctitis.    Moderate right pneumothorax again noted.    Worsening bibasilar airspace disease.

## 2020-01-09 NOTE — PROGRESS NOTE ADULT - PROBLEM SELECTOR PLAN 5
CT abd/pelvis: Large amount of stool within the rectum with surrounding inflammatory change. s/p 3 day course of IV flagyl for Stercoral colitis. However this was discontinued as it appeared to be more of an IBD picture than infectious colitis.   -Bacid TID, continue PPI, carafate, senna at bedtime.  -GI, Dr. Hutchinson following  -will do abd xray to eval for stool pattern as patient is having persistent nausea and diarrhea which is keeping her from participating in PT.  -will give simethicone for upset stomach  -d/c metamucil, senna  -patient declined colonoscopy patient reports chronic nausea for the past month. Had an EGD/Colonoscopy this year that was "negative" per patient  -Zofran PO PRN for nausea, pt refuses Reglan as long as QT permits as per GI recs, will continue with Zofran, patient with chronic nausea  -Nutrition recommended diet  -GI, Dr. De La Vega, following

## 2020-01-09 NOTE — CONSULT NOTE ADULT - PROBLEM SELECTOR RECOMMENDATION 4
chronic issue-will coordinate with ICU staff.    Thank you for consulting us and involving us in the management of this most interesting and challenging case.     We will follow along in the care of this patient.

## 2020-01-09 NOTE — PROGRESS NOTE ADULT - SUBJECTIVE AND OBJECTIVE BOX
Brief Hospital Course:   59F with PMH of ILD (unknown type, treated by Dr Hazel at [facility]), chronic hypoxemic respiratory failure and right sided pneumothorax (on 2L NC at home), pulmonary nodules, Meniere's disease, Non-Hodgkin's lymphoma (SCD/XRT/chemo at [facility] ), CVA (3/2019, residual L sided weakness, unable to use her L arm, not on ASA or Plavix due to GIB), Seizure disorder, tachycardia, MVP, chronic SDH.  pt p/w weakness, weight loss, nausea, and diarrhea. A/w UTI, colitis, R PTX.      Recent/past 24 hour events:   RRT called  @ 1901 for lethargy, hypoxia, with increased WOB and sinus tachycardia with HR in 120-130's.  Pt placed on 100% NRB, with improvement in SpO2 to 100%. Pt seemingly lethargic, and remained tachycardic, pt with coarse rhonchi throughout.  CXR with diffuse intersitial lung marking and potential superimposed PNA, cannot conclude if PTX is worsening. Pt sent for a CT Chest and placed on HFNC and transferred to the ICU for acute hypoxic respiratory failure, moderate PTX, AMS/lethargy, advanced interstitial lung disease, severe sepsis with likely superimposed PNA.      Subjective: +SOB, tired. Denies CP, palpitations, N/V, fever/chills.  ROS negative x 10 systems except as noted above      Patient is a 59y old  Female who presents with a chief complaint of pneumothorax, colitis, UTI (2020 11:05)    HPI:  59F with PMH of interstitial lung disease hx of pneumothorax (on 2L NC at home), hx pulmonary nodules, Meniere's disease, Non-Hodgkin's lymphoma (SCD/XRT/chemo at [facility] ), hx of CVA (3/2019 - residual L sided weakness, unable to use her L arm), not on ASA or Plavix due to GIB, Seizure disorder, tachycardia, MVP, hx of chronic SDH, presents with weakness, weight loss, nausea, and diarrhea for the past month. Patient admits to 4 lb weight loss in 3 weeks, was seen by her PCP and started on Zofran one week ago. Patient reports going to wound care prior to admission for bed sores, and was advised ED evaluation. Per  at bedside, patient has been having loose BMs several times a day with foul smelling urine for 4 days. Denies fever. Patient also has felt too weak to get out of bed and started using a diaper to urinate/have BMs. Has mainly been wheelchair bound recently due to weakness, but at baseline patient is able to stand and walk short distances. Patient uses 2L NC at home and noticed her SpO2 at 75% on RA with ambulation. Patient was also switched to Oxcarbazepine 2-3 months ago and noticed her symptoms of anxiety, weakness, and "memory" have worsened.  Of note, at night when patient takes her Oxcarbazepine, she starts yelling and having "hallucinations."     In the ED: temp 97.6, HR 97, /80, RR 15, SpO2 92% RA, 98% on 3L NC. WBC 11.10. UA: moderate LEC, WBC 26-50, moderate bacteria. Chest x-ray: Interval enlargement of a right-sided pneumothorax. Small bilateral pleural effusions. Chronic lung fibrotic changes. CT chest/abd/pelvis: Moderate-sized right pneumothorax. Worsening groundglass opacities in both lower lobes, possibly infection. Stercoral colitis. Indeterminant hepatic lesions, possibly metastatic disease. Received IV Rocephin, IV Flagyl, 1L NS Bolus. EKG: NSR 88    Of note,  wants us to check oxcarbazepine levels. (26 Dec 2019 21:14)    PAST MEDICAL & SURGICAL HISTORY:  Interstitial lung disease: on home o2 prn  NHL (non-Hodgkin's lymphoma): Agem 43 sp chemo/rt/stem cell  Transient cerebral ischemia, unspecified type  Mitral prolapse  History of tonsillectomy  History of appendectomy    FAMILY HISTORY:  Family history of stroke  Family history of breast cancer: Mother      Vitals   ICU Vital Signs Last 24 Hrs  T(C): 36.9 (2020 16:00), Max: 39.1 (2020 20:46)  T(F): 98.5 (2020 16:00), Max: 102.3 (2020 20:46)  HR: 110 (2020 19:39) (67 - 134), ST  BP: 188/86 (2020 19:00) (60/34 - 699/57)  BP(mean): 124 (2020 19:00) (42 - 124)  RR: 47 (2020 19:00) (21 - 47)  SpO2: 99% (2020 19:39) (86% - 100%) on HFNC @ 50%      I&O's Detail    2020 07:01  -  2020 07:00  --------------------------------------------------------  Total IN: 1790 mL  Total OUT: 1200 mL  Total NET: 590 mL    2020 07:01  -  2020 20:12  --------------------------------------------------------  IN:    lactated ringers.: 960 mL    Oral Fluid: 360 mL    Solution: 100 mL  Total IN: 1420 mL    OUT:    Voided: 250 mL  Total OUT: 250 mL    Total NET: 1170 mL      LABS                        10.5   9.16  )-----------( 451      ( 2020 05:38 )             34.1     01-09    137  |  96  |  13  ----------------------------<  102<H>  3.8   |  36<H>  |  0.63    Ca    8.8      2020 05:38  Phos  3.2     -  Mg     2.1         TPro  6.1  /  Alb  2.0<L>  /  TBili  0.2  /  DBili  <.10  /  AST  25  /  ALT  19  /  AlkPhos  81  01-09    ABG - ( 2020 10:10 )  pH, Arterial: 7.42  /  pCO2: 56    /  pO2: 62    / HCO3: 33    / Base Excess: 10.2  /  SaO2: 91        Urinalysis Basic - ( 2020 13:41 )  Color: Yellow / Appearance: Slightly Turbid / S.015 / pH: x  Gluc: x / Ketone: Negative  / Bili: Negative / Urobili: Negative   Blood: x / Protein: 25 mg/dL / Nitrite: Negative   Leuk Esterase: Moderate / RBC: 6-10 /HPF / WBC 11-25   Sq Epi: x / Non Sq Epi: Few / Bacteria: Moderate    Rapid Respiratory Viral Panel (20 @ 10:25)    Rapid RVP Result: NotDetec    Culture - Blood (20 @ 00:16)    Specimen Source: .Blood Blood-Peripheral    Culture Results:   No growth to date.    Culture - Blood (20 @ 00:16)    Specimen Source: .Blood Blood-Peripheral    Culture Results:   No growth to date.      < from: CT Chest No Cont (20 @ 20:32) >  FINDINGS:  CHEST:   LUNGS AND LARGE AIRWAYS: Patent central airways. Stable bilateral subpleural reticular opacities. Worsening confluent groundglassopacities and bibasilar consolidations.  PLEURA: Stable moderate-sized right pneumothorax. Trace left pleural effusion.  VESSELS: Ectatic thoracic descending aorta measures up to 3.8 cm in diameter..  HEART: Heart size is normal. Small pericardial effusion.  MEDIASTINUM AND IRMA: No shift of the mediastinum. No lymphadenopathy.  CHEST WALL AND LOWER NECK: Within normal limits.  VISUALIZED UPPER ABDOMEN: Please refer to the report of the dedicated CT abdomen pelvis performed earlier today.  BONES:Mild degenerative changes of the spine.  IMPRESSION:   Stable moderate-sized right pneumothorax.  Worsening confluent ground glass opacities and bibasilar consolidation superimposed on diffuse subpleural reticular opacities. Findings may represent acute pneumonia superimposed on top of underlying interstitial lung disease.  < end of copied text >    < from: CT Abdomen and Pelvis No Cont (20 @ 18:05) >  FINDINGS:  LOWER CHEST: Stable moderate right pneumothorax. Worsening bibasilar opacities. Trace bilateral effusions.  LIVER: Stable cyst.  BILE DUCTS: Nondilated.  GALLBLADDER: Normal.  SPLEEN: Normal.  PANCREAS: Diffuse atrophy.  ADRENALS: Normal.  KIDNEYS/URETERS: No hydronephrosis or urinary tract calculi.  BLADDER: Underdistended limiting evaluation.  REPRODUCTIVE ORGANS: Calcified fibroid.  BOWEL: Improving proctitis. No bowel obstruction or other bowel inflammation.  PERITONEUM: No free air or ascites. No collection.  VESSELS:  Normal caliber aorta.  RETROPERITONEUM/LYMPH NODES: No adenopathy.    ABDOMINAL WALL: Normal.  BONES: No acute bony abnormality.  IMPRESSION:   Improving proctitis.  Moderate right pneumothorax again noted.  Worsening bibasilar airspace disease.  < end of copied text >    < from: Xray Chest 1 View-PORTABLE IMMEDIATE (20 @ 19:42) >  The heartis not enlarged. The trachea is midline. Moderate size right pneumothorax is again noted. There is again volume loss left lung with apical pleural thickening. There is diffuse left-sided infiltrate. There is osteopenia of the bony structures.  Impression: No significant change from the prior study. Moderate right-sided pneumothorax similar to the prior study. Diffuse left-sided infiltrate and volume loss in the left lung  < end of copied text >      < from: 12 Lead ECG (20 @ 19:12) >  Ventricular Rate 129 BPM  Atrial Rate 129 BPM  P-R Interval 134 ms  QRS Duration 78 ms  Q-T Interval 308 ms  QTC Calculation(Bezet) 451 ms  P Axis 50 degrees  R Axis 34 degrees  T Axis 4 degrees  Diagnosis Line *** Poor data quality, interpretation may be adversely affected  Sinus tachycardia  Nonspecific ST and T wave abnormality  Abnormal ECG  When compared with ECG of 2020 05:17,  No significant change was found  < end of copied text >      MEDICATIONS  (STANDING):  dexMEDEtomidine Infusion 0.3 MICROgram(s)/kG/Hr (4.08 mL/Hr) IV Continuous <Continuous>  enoxaparin Injectable 40 milliGRAM(s) SubCutaneous daily  hydrocortisone 2.5% Rectal Cream 1 Application(s) Rectal two times a day  hydrocortisone hemorrhoidal Suppository 1 Suppository(s) Rectal two times a day  influenza   Vaccine 0.5 milliLiter(s) IntraMuscular once  lactated ringers. 1000 milliLiter(s) (80 mL/Hr) IV Continuous  lactobacillus acidophilus 1 Tablet(s) Oral three times a day with meals  lidocaine 2% Gel 1 Application(s) Topical three times a day  OXcarbazepine 300 milliGRAM(s) Oral two times a day  pantoprazole    Tablet 40 milliGRAM(s) Oral before breakfast  piperacillin/tazobactam IVPB.. 3.375 Gram(s) IV Intermittent every 8 hours  propranolol 20 milliGRAM(s) Oral three times a day  sodium chloride 3%  Inhalation 3 milliLiter(s) Inhalation every 6 hours  sucralfate 1 Gram(s) Oral two times a day  tiotropium 18 MICROgram(s) Capsule 1 Capsule(s) Inhalation daily  witch hazel Pads 1 Application(s) Topical three times a day  zinc oxide 20% Ointment 1 Application(s) Topical two times a day    MEDICATIONS  (PRN):  acetaminophen   Tablet .. 650 milliGRAM(s) Oral every 6 hours PRN Temp greater or equal to 38C (100.4F), Mild Pain (1 - 3)  ALBUTerol    0.083% 2.5 milliGRAM(s) Nebulizer every 6 hours PRN Shortness of Breath and/or Wheezing  ondansetron    Tablet 4 milliGRAM(s) Oral every 12 hours PRN Nausea  simethicone 80 milliGRAM(s) Chew two times a day PRN Gas    Allergies:  IV Contrast (Anaphylaxis)  shellfish. (Anaphylaxis)      Physical Exam:   Constitutional: mild distress (tachypneic, SOB) well-groomed, thin/chronically ill appearing  HEENT: PERRLA, EOMI, dry mouth  Neck: supple,  No JVD  Respiratory: Breath Sounds equal, mid to base rales bialterally, no rhonchi/wheezing, no accessory muscle use noted  Cardiovascular: tachycardic, regular rhythm, normal S1, S2; no murmurs or rub  Gastrointestinal: Soft, non-tender, non distended, + bowel sounds  Extremities: VAIL x 3 (LUE plegia with contracture to left hand-splint reapplied as able), no peripheral edema, no cyanosis, no clubbing. 2+ DP pulses. No BLE tenderness.    Neurological: A+O x 3; speech soft/whisper like but clear and intact  Skin: warm, dry, well perfused      Code Status: full code

## 2020-01-09 NOTE — PROGRESS NOTE ADULT - SUBJECTIVE AND OBJECTIVE BOX
Neurology follow up note    ROMIE [id number]      Interval History:    Patient events noted in ICU feels ok occ cough and headache     MEDICATIONS    acetaminophen   Tablet .. 650 milliGRAM(s) Oral every 6 hours PRN  ALBUTerol    0.083% 2.5 milliGRAM(s) Nebulizer every 6 hours PRN  enoxaparin Injectable 40 milliGRAM(s) SubCutaneous daily  hydrocortisone 2.5% Rectal Cream 1 Application(s) Rectal two times a day  hydrocortisone hemorrhoidal Suppository 1 Suppository(s) Rectal two times a day  influenza   Vaccine 0.5 milliLiter(s) IntraMuscular once  lactated ringers. 1000 milliLiter(s) IV Continuous <Continuous>  lactobacillus acidophilus 1 Tablet(s) Oral three times a day with meals  lidocaine 2% Gel 1 Application(s) Topical three times a day  ondansetron    Tablet 4 milliGRAM(s) Oral every 12 hours PRN  OXcarbazepine 300 milliGRAM(s) Oral two times a day  pantoprazole    Tablet 40 milliGRAM(s) Oral before breakfast  piperacillin/tazobactam IVPB.. 3.375 Gram(s) IV Intermittent every 8 hours  propranolol 20 milliGRAM(s) Oral three times a day  simethicone 80 milliGRAM(s) Chew two times a day PRN  sucralfate 1 Gram(s) Oral two times a day  tiotropium 18 MICROgram(s) Capsule 1 Capsule(s) Inhalation daily  witch hazel Pads 1 Application(s) Topical three times a day  zinc oxide 20% Ointment 1 Application(s) Topical two times a day      Allergies    IV Contrast (Anaphylaxis)  shellfish. (Anaphylaxis)    Intolerances            Vital Signs Last 24 Hrs  T(C): 37.2 (09 Jan 2020 07:34), Max: 39.1 (08 Jan 2020 20:46)  T(F): 98.9 (09 Jan 2020 07:34), Max: 102.3 (08 Jan 2020 20:46)  HR: 87 (09 Jan 2020 06:00) (67 - 134)  BP: 141/75 (09 Jan 2020 06:00) (60/34 - 699/57)  BP(mean): 102 (09 Jan 2020 06:00) (42 - 102)  RR: 29 (09 Jan 2020 06:00) (18 - 39)  SpO2: 97% (09 Jan 2020 06:00) (92% - 100%)    REVIEW OF SYSTEMS:  Constitutional:  The patient denies fever, chills, or night sweats.  Head:  Occ headaches.  Eyes:  No double vision or blurry vision.  Ears:  No ringing in the ears.  Neck:  No neck pain.  Respiratory:  Occasional cough with shortness of breath.  Cardiovascular:  Positive right-sided chest pain.  Abdomen:  occ nausea,  no vomiting, or abdominal pain.  Extremities/Neurological:  No numbness or tingling.  Musculoskeletal:  Occasional joint pain.    PHYSICAL EXAMINATION:   HEENT:  Head:  Normocephalic, atraumatic.  Eyes:  No scleral icterus.  Ears:  Hearing bilaterally appeared to be intact.  NECK:  Supple.  RESPIRATORY:  Decreased breath sounds bilaterally, but most prominent on the right.  CARDIOVASCULAR:  S1 and S2 heard.  ABDOMEN:  Soft and nontender.  Extremities:  No clubbing or cyanosis were noted.      NEUROLOGIC:  The patient is awake and alert.  Location was [facility], year was 2019, month was December.  Was able to name objects.  Extraocular movements were intact.  Pupils were equal, round, and reactive bilaterally 3 mm to 2 mm.  Speech was fluent.  Smile was symmetric.  Motor:  Right upper was 5/5, left upper  arm brace was 3/5 decrease rom hand and finger in flexed position , right lower was 4/5, left lower was 3+/5.  As per my conversation with the spouse, after her apparent event back in March, questionable seizure versus questionable stroke.  She was left with left-sided weakness.  Sensory:  Bilateral upper and lower appeared intact to light touch.                  LABS:  CBC Full  -  ( 09 Jan 2020 05:38 )  WBC Count : 9.16 K/uL  RBC Count : 3.77 M/uL  Hemoglobin : 10.5 g/dL  Hematocrit : 34.1 %  Platelet Count - Automated : 451 K/uL  Mean Cell Volume : 90.5 fl  Mean Cell Hemoglobin : 27.9 pg  Mean Cell Hemoglobin Concentration : 30.8 gm/dL  Auto Neutrophil # : 6.91 K/uL  Auto Lymphocyte # : 1.25 K/uL  Auto Monocyte # : 0.90 K/uL  Auto Eosinophil # : 0.04 K/uL  Auto Basophil # : 0.03 K/uL  Auto Neutrophil % : 75.6 %  Auto Lymphocyte % : 13.6 %  Auto Monocyte % : 9.8 %  Auto Eosinophil % : 0.4 %  Auto Basophil % : 0.3 %      01-09    137  |  96  |  13  ----------------------------<  102<H>  3.8   |  36<H>  |  0.63    Ca    8.8      09 Jan 2020 05:38  Phos  3.2     01-09  Mg     2.1     01-09    TPro  6.1  /  Alb  2.0<L>  /  TBili  0.2  /  DBili  <.10  /  AST  25  /  ALT  19  /  AlkPhos  81  01-09    Hemoglobin A1C:     LIVER FUNCTIONS - ( 09 Jan 2020 05:38 )  Alb: 2.0 g/dL / Pro: 6.1 g/dL / ALK PHOS: 81 U/L / ALT: 19 U/L / AST: 25 U/L / GGT: x           Vitamin B12         RADIOLOGY    ANALYSIS AND PLAN:  This is a 59-year-old with a history of possibly epilepsy verse TIAs, history of chronic subdural hydromas and change in mental status.    1.	For episode of change in mental status, as per my conversation with the spouse, these appear to occur primarily at the same time at night for the last three to four weeks.  The patient has been on Trileptal for about eight to nine months.  Suspect less likely this is Trileptal, Questionable, the patient could have any type of sleep-related disorder causing these events to occur at night.  I spoke with the spouse, the patient should undergo sleep studies.  2.	For history of chronic subdural hematoma, these appeared to have resolved from previous MRI.  3.	For history of possible underlying epilepsy, for now, I will continue the patient on her Trileptal..  4.	Monitor CO2 levels as needed and respiratory status   5.	spoke outside neurologist Dr. Wallace in past agrees to continue trileptal for now  His telephone number is [phone].  6.	Spouse's name is Marialuisa, his telephone number is [phone]   7.	CT chest repeat - ? worsening of PTX -  pulmonary follow up antibiotics as needed   8.	Greater than 25 minutes of time was spent with the patient, plan of care, reviewing data, speaking to the family and multidisciplinary healthcare team

## 2020-01-09 NOTE — PROGRESS NOTE ADULT - ATTENDING COMMENTS
I personally conducted a physical examination of the patient. I personally gathered the patient's history. I edited the above listed findings which were prepared by the listed resident physician. I personally discussed the plan of care with the patient. The questions and concerns were addressed to the best of my ability. The patient is in agreement with the listed treatment plan.     - upgraded to MICU overnight due to hypotension, dyspnea, hypoxia but responded quite well to high flow supplemental o2. discussed w/ ID today, c/w zosyn for now and continue to monitor closely in MICU. agree w/ assessment of sputum cx and tte  - pt is chronically ill w/ poor nutritional reserve and poor functional status at baseline. also suspect underlying psychiatric condition complicating matters.  - I personally reviewed the patient's vital signs, labs, microbiology data, radiographic findings and consultant recommendations with the residency team

## 2020-01-09 NOTE — PROGRESS NOTE ADULT - PROBLEM SELECTOR PLAN 1
ct showing stercoral colitis c/b rectal pain in setting of hemorrhoids  prior gi notes reviewed, hx of ibs  symptoms improving  monitoring off  bowel regimen   cont lido and hydrocortisone cream   cont anusol supp bid, witch hazel pads, sitz baths   diet as tolerated  monitor exam/gi fxn  up to date w colonoscopy, 4/2019 showed only non bleeding internal hemorrhoids; given persistence of symptoms, offered repeat c-scope but pt defers  cont care as above, op gi f/u upon dc; no gi objection to dc planning ct showing stercoral colitis c/b rectal pain in setting of hemorrhoids  prior gi notes reviewed, hx of ibs  clinically improved; improvement on repeat imaging as well  monitoring off  bowel regimen   cont lido and hydrocortisone cream   cont anusol supp bid, witch hazel pads, sitz baths prn  monitor exam/gi fxn  up to date w colonoscopy, 4/2019 showed only non bleeding internal hemorrhoids; given persistence of symptoms, offered repeat c-scope but pt defers

## 2020-01-09 NOTE — PROGRESS NOTE ADULT - ASSESSMENT
59F with PMH of ILD (unknown type) seen by [facility] (Dr. Hazel) hx of pneumothorax (on 2L NC at home), hx pulmonary nodules, Meniere's disease, Non-Hodgkin's lymphoma (SCD/XRT/chemo at [facility] 2003), hx of CVA (3/2019 - residual L sided weakness, unable to use her L arm), not on ASA or Plavix due to GIB, Seizure disorder, tachycardia, MVP, hx of chronic SDH, presents with weakness, weight loss, nausea, and diarrhea admitted for R pneumothorax now with new fever and worsening PTX.     NEURO:   - tylenol for fever PRN and mild pain prn   -Trileptal for seizure d/o  - Hx of CVA with left upper extremity paresis and overall left sided weakness. Continue LUE brace     PULM:    - Acute on chronic hypoxemic respiratory failure 2/2 worsening ILD with superimposing. Her saturation improved on HFNC 40/50%, SpO2 100%. Wean as tolerated as she is on 2L NC via O2.   - Will keep pigtail cath at bedside in the setting that pt decompensates. Patient appears to have trap lung on CT chest.   -Thoracic surgery Dr. France following.   - Dr. Gay Colon is following. Dr. Hazel (outpatient ILD doctor from [facility]) notified of patient's status by Dr. Rashid.   - Consider steroids if patient's respiratory status worsens but caution as patient states she received steroids for her Non-hodgkin's lymphoma and experienced steroid induced delirium.   - Continue Albuterol nebs as needed, spiriva daily,     CV:   -Continue propanolol for tachycardia, hx of MVP   - f/u repeat TTE     GI:    - Upgrade diet to CLD and upgrade as tolerated.   - Treated with flagyl on this admission for stercolitis. Can continue carafate, bacid, simethicone, Proctosol,     :   - renal function stable. Replete lytes as needed. On LR @80cc/hr x24 hours.     ID:   - Bcx NGTD (12/26 and 1/8)   - For suspected aspiration pneumonia causing worsening hypoxemic respiratory failure, monitor temperatures. Continue Zosyn IV q8 (day 2)   -f/u urine legionella, repeat UA, urine cx, MRSA/MSSA nares, sputum cx,     HEME/DVT PPX:   - Lovenox SQ DVT ppx     Full Code  Skin: no lines, no avendano

## 2020-01-09 NOTE — PROGRESS NOTE ADULT - SUBJECTIVE AND OBJECTIVE BOX
Patient is a 59y old  Female who presents with a chief complaint of pneumothorax, colitis, UTI (09 Jan 2020 08:41)    24 hour events: Patient was admitted to the ICU for acute hypoxemic respiratory failure after RRT overnight for hypoxia, lethargy and fever. She was placed on Hi-flow, given tylenol, made NPO and started on IVF. She was seen this morning lying in bed c/o headache, being hungry but admits to improved work of breathing.     REVIEW OF SYSTEMS  Constitutional: No fever, chills, fatigue  Neuro: No headache, numbness, weakness  Resp: No cough, wheezing, shortness of breath  CVS: No chest pain, palpitations, leg swelling  GI: No abdominal pain, nausea, vomiting, diarrhea   : No dysuria, frequency, incontinence  Skin: No itching, burning, rashes, or lesions   Msk: No joint pain or swelling  Psych: No depression, anxiety, mood swings  Heme: No bleeding    T(F): 98.9 (01-09-20 @ 07:34), Max: 102.3 (01-08-20 @ 20:46)  HR: 87 (01-09-20 @ 06:00) (67 - 134)  BP: 141/75 (01-09-20 @ 06:00) (60/34 - 699/57)  RR: 29 (01-09-20 @ 06:00) (18 - 39)  SpO2: 97% (01-09-20 @ 06:00) (92% - 100%)  Wt(kg): --            I&O's Summary    01-08 @ 07:01  -  01-09 @ 07:00  --------------------------------------------------------  IN: 1790 mL / OUT: 1200 mL / NET: 590 mL      PHYSICAL EXAM  General:   CNS:   HEENT:   Resp:   CVS:   Abd:   Ext:   Skin:     MEDICATIONS  piperacillin/tazobactam IVPB.. IV Intermittent    propranolol Oral      ALBUTerol    0.083% Nebulizer PRN  tiotropium 18 MICROgram(s) Capsule Inhalation    acetaminophen   Tablet .. Oral PRN  ondansetron    Tablet Oral PRN  OXcarbazepine Oral      enoxaparin Injectable SubCutaneous    pantoprazole    Tablet Oral  simethicone Chew PRN  sucralfate Oral      lactated ringers. IV Continuous    influenza   Vaccine IntraMuscular    hydrocortisone 2.5% Rectal Cream Rectal  hydrocortisone hemorrhoidal Suppository Rectal  lidocaine 2% Gel Topical  witch hazel Pads Topical  zinc oxide 20% Ointment Topical    lactobacillus acidophilus Oral                          10.5   9.16  )-----------( 451      ( 09 Jan 2020 05:38 )             34.1       01-09    137  |  96  |  13  ----------------------------<  102<H>  3.8   |  36<H>  |  0.63    Ca    8.8      09 Jan 2020 05:38  Phos  3.2     01-09  Mg     2.1     01-09    TPro  6.1  /  Alb  2.0<L>  /  TBili  0.2  /  DBili  <.10  /  AST  25  /  ALT  19  /  AlkPhos  81  01-09    Lactate 1.8           01-08 @ 17:01      .Blood Blood-Peripheral   No growth to date. -- 01-08 @ 00:16      Rapid RVP Result: NotDetec (01-08 @ 10:25)    Radiology: ***  Bedside lung ultrasound: ***  Bedside ECHO: ***    CENTRAL LINE: Y/N          DATE INSERTED:              REMOVE: Y/N  LUKE: Y/N                        DATE INSERTED:              REMOVE: Y/N  A-LINE: Y/N                       DATE INSERTED:              REMOVE: Y/N    GLOBAL ISSUE/BEST PRACTICE  Analgesia:   Sedation:   CAM-ICU:   HOB elevation: yes  Stress ulcer prophylaxis:   VTE prophylaxis:   Glycemic control:   Nutrition:     CODE STATUS: ***  [facility] discussion: Y Patient is a 59y old  Female who presents with a chief complaint of pneumothorax, colitis, UTI (09 Jan 2020 08:41)    24 hour events: Patient was admitted to the ICU for acute hypoxemic respiratory failure after RRT overnight for hypoxia, lethargy and fever. She was placed on Hi-flow, given tylenol, made NPO and started on IVF. She was seen this morning lying in bed c/o headache, being hungry but admits to improved work of breathing.     REVIEW OF SYSTEMS  Constitutional: admits to feeling cold but (ac is blowing over her), admits to weakness as she has not been able to get out of bed for >7 weeks.   Neuro: complained of headache, generalized weakness  Resp: Improved work of breathing   CVS: No chest pain, palpitations, leg swelling  GI: No abdominal pain, nausea, vomiting, diarrhea   : states she doesn't know if she still has urinary symptoms  Skin: No itching, burning, rashes, or lesions   Msk: paresis of left upper extremity     T(F): 98.9 (01-09-20 @ 07:34), Max: 102.3 (01-08-20 @ 20:46)  HR: 87 (01-09-20 @ 06:00) (67 - 134)  BP: 141/75 (01-09-20 @ 06:00) (60/34 - 699/57)  RR: 29 (01-09-20 @ 06:00) (18 - 39)  SpO2: 97% (01-09-20 @ 06:00) (92% - 100%)  Wt(kg): --    I&O's Summary    01-08 @ 07:01  -  01-09 @ 07:00  --------------------------------------------------------  IN: 1790 mL / OUT: 1200 mL / NET: 590 mL      PHYSICAL EXAM  General: Middle age female, appears older than stated age, frail, ill appearing  CNS: awake, alert, oriented x3, sensation fully intact in all 4 extremities  HEENT: NC/AT, pupils equal and reactive to light b/l, MMM  Resp: diminished breath sounds b/l (Left>right), left posterior crackles  CVS: s1,s2, systolic murmur auscultated  Abd:   Ext:   Skin:     MEDICATIONS  piperacillin/tazobactam IVPB.. IV Intermittent    propranolol Oral      ALBUTerol    0.083% Nebulizer PRN  tiotropium 18 MICROgram(s) Capsule Inhalation    acetaminophen   Tablet .. Oral PRN  ondansetron    Tablet Oral PRN  OXcarbazepine Oral      enoxaparin Injectable SubCutaneous    pantoprazole    Tablet Oral  simethicone Chew PRN  sucralfate Oral      lactated ringers. IV Continuous    influenza   Vaccine IntraMuscular    hydrocortisone 2.5% Rectal Cream Rectal  hydrocortisone hemorrhoidal Suppository Rectal  lidocaine 2% Gel Topical  witch hazel Pads Topical  zinc oxide 20% Ointment Topical    lactobacillus acidophilus Oral                          10.5   9.16  )-----------( 451      ( 09 Jan 2020 05:38 )             34.1       01-09    137  |  96  |  13  ----------------------------<  102<H>  3.8   |  36<H>  |  0.63    Ca    8.8      09 Jan 2020 05:38  Phos  3.2     01-09  Mg     2.1     01-09    TPro  6.1  /  Alb  2.0<L>  /  TBili  0.2  /  DBili  <.10  /  AST  25  /  ALT  19  /  AlkPhos  81  01-09    Lactate 1.8           01-08 @ 17:01      .Blood Blood-Peripheral   No growth to date. -- 01-08 @ 00:16      Rapid RVP Result: NotDetec (01-08 @ 10:25)    Radiology: ***  Bedside lung ultrasound: ***  Bedside ECHO: ***    CENTRAL LINE: Y/N          DATE INSERTED:              REMOVE: Y/N  LUKE: Y/N                        DATE INSERTED:              REMOVE: Y/N  A-LINE: Y/N                       DATE INSERTED:              REMOVE: Y/N    GLOBAL ISSUE/BEST PRACTICE  Analgesia:   Sedation:   CAM-ICU:   HOB elevation: yes  Stress ulcer prophylaxis:   VTE prophylaxis:   Glycemic control:   Nutrition:     CODE STATUS: ***  [facility] discussion: Y Patient is a 59y old  Female who presents with a chief complaint of pneumothorax, colitis, UTI (09 Jan 2020 08:41)    24 hour events: Patient was admitted to the ICU for acute hypoxemic respiratory failure after RRT overnight for hypoxia, lethargy and fever. She was placed on Hi-flow, given tylenol, made NPO and started on IVF. She was seen this morning lying in bed c/o headache, being hungry but admits to improved work of breathing.     REVIEW OF SYSTEMS  Constitutional: admits to feeling cold but (ac is blowing over her), admits to weakness as she has not been able to get out of bed for >7 weeks.   Neuro: complained of headache, generalized weakness  Resp: Improved work of breathing   CVS: No chest pain, palpitations, leg swelling  GI: No abdominal pain, nausea, vomiting, diarrhea   : states she doesn't know if she still has urinary symptoms  Skin: No itching, burning, rashes, or lesions   Msk: paresis of left upper extremity     T(F): 98.9 (01-09-20 @ 07:34), Max: 102.3 (01-08-20 @ 20:46)  HR: 87 (01-09-20 @ 06:00) (67 - 134)  BP: 141/75 (01-09-20 @ 06:00) (60/34 - 699/57)  RR: 29 (01-09-20 @ 06:00) (18 - 39)  SpO2: 97% (01-09-20 @ 06:00) (92% - 100%)    I&O's Summary    01-08 @ 07:01  -  01-09 @ 07:00  --------------------------------------------------------  IN: 1790 mL / OUT: 1200 mL / NET: 590 mL    PHYSICAL EXAM  General: Middle age female, appears older than stated age, frail, ill appearing  CNS: awake, alert, oriented x3, sensation fully intact in all 4 extremities  HEENT: NC/AT, pupils equal and reactive to light b/l, MMM  Resp: diminished breath sounds b/l (Left>right), left posterior crackles  CVS: s1,s2, systolic murmur auscultated  Abd: soft, non-tender, BSx4   Ext: left upper extremity in brace as hand is pointed outward   Skin:     MEDICATIONS  piperacillin/tazobactam IVPB.. IV Intermittent    propranolol Oral      ALBUTerol    0.083% Nebulizer PRN  tiotropium 18 MICROgram(s) Capsule Inhalation    acetaminophen   Tablet .. Oral PRN  ondansetron    Tablet Oral PRN  OXcarbazepine Oral      enoxaparin Injectable SubCutaneous    pantoprazole    Tablet Oral  simethicone Chew PRN  sucralfate Oral      lactated ringers. IV Continuous    influenza   Vaccine IntraMuscular    hydrocortisone 2.5% Rectal Cream Rectal  hydrocortisone hemorrhoidal Suppository Rectal  lidocaine 2% Gel Topical  witch hazel Pads Topical  zinc oxide 20% Ointment Topical    lactobacillus acidophilus Oral                          10.5   9.16  )-----------( 451      ( 09 Jan 2020 05:38 )             34.1       01-09    137  |  96  |  13  ----------------------------<  102<H>  3.8   |  36<H>  |  0.63    Ca    8.8      09 Jan 2020 05:38  Phos  3.2     01-09  Mg     2.1     01-09    TPro  6.1  /  Alb  2.0<L>  /  TBili  0.2  /  DBili  <.10  /  AST  25  /  ALT  19  /  AlkPhos  81  01-09    Lactate 1.8           01-08 @ 17:01      .Blood Blood-Peripheral   No growth to date. -- 01-08 @ 00:16      Rapid RVP Result: Octaviotec (01-08 @ 10:25)    Radiology: ***  Bedside lung ultrasound: ***  Bedside ECHO: ***    CENTRAL LINE: Y/N          DATE INSERTED:              REMOVE: Y/N  LUKE: Y/N                        DATE INSERTED:              REMOVE: Y/N  A-LINE: Y/N                       DATE INSERTED:              REMOVE: Y/N    GLOBAL ISSUE/BEST PRACTICE  Analgesia:   Sedation:   CAM-ICU:   HOB elevation: yes  Stress ulcer prophylaxis:   VTE prophylaxis:   Glycemic control:   Nutrition:     CODE STATUS: ***  [facility] discussion: Y Patient is a 59y old  Female who presents with a chief complaint of pneumothorax, colitis, UTI (09 Jan 2020 08:41)    24 hour events: Patient was admitted to the ICU for acute hypoxemic respiratory failure, lehtargy after RRT overnight for hypoxia, lethargy and fever. She was placed on Hi-flow, given tylenol, made NPO and started on IVF. CT chest showed moderate PTX, unchanged from admission. She was seen this morning lying in bed c/o headache and being hungry but admits to improved work of breathing.     REVIEW OF SYSTEMS  Constitutional: admits to feeling cold but (ac is blowing over her), admits to weakness as she has not been able to get out of bed for >7 weeks.   Neuro: complained of headache, generalized weakness  Resp: Improved work of breathing   CVS: No chest pain, palpitations, leg swelling  GI: No abdominal pain, nausea, vomiting, diarrhea   : states she doesn't know if she still has urinary symptoms  Skin: No itching, burning, rashes, or lesions   Msk: paresis of left upper extremity     T(F): 98.9 (01-09-20 @ 07:34), Max: 102.3 (01-08-20 @ 20:46)  HR: 87 (01-09-20 @ 06:00) (67 - 134)  BP: 141/75 (01-09-20 @ 06:00) (60/34 - 699/57)  RR: 29 (01-09-20 @ 06:00) (18 - 39)  SpO2: 97% (01-09-20 @ 06:00) (92% - 100%)    I&O's Summary    01-08 @ 07:01  -  01-09 @ 07:00  --------------------------------------------------------  IN: 1790 mL / OUT: 1200 mL / NET: 590 mL    PHYSICAL EXAM  General: Middle age female, appears older than stated age, frail, ill appearing  CNS: awake, alert, oriented x3, sensation fully intact in all 4 extremities  HEENT: NC/AT, pupils equal and reactive to light b/l, MMM  Resp: diminished breath sounds b/l (Left>right), left posterior crackles  CVS: s1,s2, systolic murmur auscultated  Abd: soft, non-tender, BSx4   Ext: left upper extremity in brace as hand is pointed outward, no c/c/e  Skin: warm, dry     MEDICATIONS  piperacillin/tazobactam IVPB.. IV Intermittent  propranolol Oral  ALBUTerol    0.083% Nebulizer PRN  tiotropium 18 MICROgram(s) Capsule Inhalation  acetaminophen   Tablet .. Oral PRN  ondansetron    Tablet Oral PRN  OXcarbazepine Oral  enoxaparin Injectable SubCutaneous  pantoprazole    Tablet Oral  simethicone Chew PRN  sucralfate Oral  lactated ringers. IV Continuous  influenza   Vaccine IntraMuscular  hydrocortisone 2.5% Rectal Cream Rectal  hydrocortisone hemorrhoidal Suppository Rectal  lidocaine 2% Gel Topical  witch hazel Pads Topical  zinc oxide 20% Ointment Topical  lactobacillus acidophilus Oral                          10.5   9.16  )-----------( 451      ( 09 Jan 2020 05:38 )             34.1       01-09    137  |  96  |  13  ----------------------------<  102<H>  3.8   |  36<H>  |  0.63    Ca    8.8      09 Jan 2020 05:38  Phos  3.2     01-09  Mg     2.1     01-09    TPro  6.1  /  Alb  2.0<L>  /  TBili  0.2  /  DBili  <.10  /  AST  25  /  ALT  19  /  AlkPhos  81  01-09    Lactate 1.8           01-08 @ 17:01    .Blood Blood-Peripheral   No growth to date. -- 01-08 @ 00:16      Rapid RVP Result: NotDetec (01-08 @ 10:25)    Radiology: < from: CT Chest No Cont (01.08.20 @ 20:32) >  Stable moderate-sized right pneumothorax.    Worsening confluent groundglass opacities and bibasilar consolidation superimposed on diffuse subpleural reticular opacities. Findings may represent acute pneumonia superimposed on top of underlying interstitial lung disease.    < end of copied text >    CENTRAL LINE: N         LUKE: N                   A-LINE: N                           GLOBAL ISSUE/BEST PRACTICE  Analgesia: n/a  Sedation: n/a  HOB elevation: yes  Stress ulcer prophylaxis: Protonix 40mg PO qd  VTE prophylaxis: Lovenox 40mg SubQ daily   Glycemic control: n/a  Nutrition: CLD    CODE STATUS: Full Patient is a 59y old  Female who presents with a chief complaint of pneumothorax, colitis, UTI (09 Jan 2020 08:41)    24 hour events: Patient was admitted to the ICU for acute hypoxemic respiratory failure, lehtargy after RRT overnight for hypoxia, lethargy and fever. She was placed on Hi-flow, given tylenol, made NPO and started on IVF. CT chest showed moderate PTX, unchanged from admission. She was seen this morning lying in bed c/o headache and being hungry but admits to improved work of breathing.     REVIEW OF SYSTEMS  Constitutional: admits to feeling cold but (ac is blowing over her), admits to weakness as she has not been able to get out of bed for >7 weeks.   Neuro: complained of headache, generalized weakness  Resp: Improved work of breathing   CVS: No chest pain, palpitations, leg swelling  GI: No abdominal pain, nausea, vomiting, diarrhea   : states she doesn't know if she still has urinary symptoms  Skin: No itching, burning, rashes, or lesions   Msk: paresis of left upper extremity     T(F): 98.9 (01-09-20 @ 07:34), Max: 102.3 (01-08-20 @ 20:46)  HR: 87 (01-09-20 @ 06:00) (67 - 134)  BP: 141/75 (01-09-20 @ 06:00) (60/34 - 699/57)  RR: 29 (01-09-20 @ 06:00) (18 - 39)  SpO2: 97% (01-09-20 @ 06:00) (92% - 100%)    I&O's Summary    01-08 @ 07:01  -  01-09 @ 07:00  --------------------------------------------------------  IN: 1790 mL / OUT: 1200 mL / NET: 590 mL    PHYSICAL EXAM  General: Middle age female, appears older than stated age, frail, ill appearing  CNS: awake, alert, oriented x3, sensation fully intact in all 4 extremities  HEENT: NC/AT, pupils equal and reactive to light b/l, MMM  Resp: diminished breath sounds b/l (Left>right), coarse crackles thoughout  CVS: s1,s2, systolic murmur auscultated  Abd: soft, non-tender, BSx4   Ext: left upper extremity in brace as hand is pointed outward, no c/c/e  Skin: warm, dry     MEDICATIONS  piperacillin/tazobactam IVPB.. IV Intermittent  propranolol Oral  ALBUTerol    0.083% Nebulizer PRN  tiotropium 18 MICROgram(s) Capsule Inhalation  acetaminophen   Tablet .. Oral PRN  ondansetron    Tablet Oral PRN  OXcarbazepine Oral  enoxaparin Injectable SubCutaneous  pantoprazole    Tablet Oral  simethicone Chew PRN  sucralfate Oral  lactated ringers. IV Continuous  influenza   Vaccine IntraMuscular  hydrocortisone 2.5% Rectal Cream Rectal  hydrocortisone hemorrhoidal Suppository Rectal  lidocaine 2% Gel Topical  witch hazel Pads Topical  zinc oxide 20% Ointment Topical  lactobacillus acidophilus Oral                          10.5   9.16  )-----------( 451      ( 09 Jan 2020 05:38 )             34.1       01-09    137  |  96  |  13  ----------------------------<  102<H>  3.8   |  36<H>  |  0.63    Ca    8.8      09 Jan 2020 05:38  Phos  3.2     01-09  Mg     2.1     01-09    TPro  6.1  /  Alb  2.0<L>  /  TBili  0.2  /  DBili  <.10  /  AST  25  /  ALT  19  /  AlkPhos  81  01-09    Lactate 1.8           01-08 @ 17:01    .Blood Blood-Peripheral   No growth to date. -- 01-08 @ 00:16      Rapid RVP Result: NotDetec (01-08 @ 10:25)    Radiology: < from: CT Chest No Cont (01.08.20 @ 20:32) >  Stable moderate-sized right pneumothorax.    Worsening confluent groundglass opacities and bibasilar consolidation superimposed on diffuse subpleural reticular opacities. Findings may represent acute pneumonia superimposed on top of underlying interstitial lung disease.    < end of copied text >    CENTRAL LINE: N         LUKE: N                   A-LINE: N                           GLOBAL ISSUE/BEST PRACTICE  Analgesia: n/a  Sedation: n/a  HOB elevation: yes  Stress ulcer prophylaxis: Protonix 40mg PO qd  VTE prophylaxis: Lovenox 40mg SubQ daily   Glycemic control: n/a  Nutrition: CLD    CODE STATUS: Full

## 2020-01-09 NOTE — CHART NOTE - NSCHARTNOTEFT_GEN_A_CORE
Assessment: Pt seen for malnutrition follow up. Chart reviewed, hospital course noted. Pt s/p RRT transferred to ICU yesterday for SOB and desaturation.    Pt lethargic at this time. On high flow NC. Subjective information obtained from chart review, RN. Pt noted with many food preferences and persistent lack of appetite. Pt with fluctuating diarrhea/constipation, per RN had formed BM overnight 1/8. GI following for colitis.    Factors impacting intake: [ ] none [ ] nausea  [ ] vomiting [ ] diarrhea [ ] constipation  [ ]chewing problems [ ] swallowing issues  [X] other: lethargy     Diet Presciption: Diet, NPO:   Except Medications (01-08-20 @ 21:05)    Intake: NPO at present but previously with poor appetite/intake. 0-25% past 2 days, 50-85% prior to that    Current Weight: 121.6 pounds (1/8)    Pertinent Medications: MEDICATIONS  (STANDING):  enoxaparin Injectable 40 milliGRAM(s) SubCutaneous daily  hydrocortisone 2.5% Rectal Cream 1 Application(s) Rectal two times a day  hydrocortisone hemorrhoidal Suppository 1 Suppository(s) Rectal two times a day  influenza   Vaccine 0.5 milliLiter(s) IntraMuscular once  lactated ringers. 1000 milliLiter(s) (80 mL/Hr) IV Continuous <Continuous>  lactobacillus acidophilus 1 Tablet(s) Oral three times a day with meals  lidocaine 2% Gel 1 Application(s) Topical three times a day  ondansetron Injectable 4 milliGRAM(s) IV Push once  OXcarbazepine 300 milliGRAM(s) Oral two times a day  pantoprazole    Tablet 40 milliGRAM(s) Oral before breakfast  piperacillin/tazobactam IVPB.. 3.375 Gram(s) IV Intermittent every 8 hours  propranolol 20 milliGRAM(s) Oral three times a day  sucralfate 1 Gram(s) Oral two times a day  tiotropium 18 MICROgram(s) Capsule 1 Capsule(s) Inhalation daily  witch hazel Pads 1 Application(s) Topical three times a day  zinc oxide 20% Ointment 1 Application(s) Topical two times a day    MEDICATIONS  (PRN):  acetaminophen   Tablet .. 650 milliGRAM(s) Oral every 6 hours PRN Temp greater or equal to 38C (100.4F), Mild Pain (1 - 3)  ALBUTerol    0.083% 2.5 milliGRAM(s) Nebulizer every 6 hours PRN Shortness of Breath and/or Wheezing  ondansetron    Tablet 4 milliGRAM(s) Oral every 12 hours PRN Nausea  simethicone 80 milliGRAM(s) Chew two times a day PRN Upset Stomach    Pertinent Labs: 01-09 Na137 mmol/L Glu 102 mg/dL<H> K+ 3.8 mmol/L Cr  0.63 mg/dL BUN 13 mg/dL 01-09 Phos 3.2 mg/dL 01-09 Alb 2.0 g/dL<L>     CAPILLARY BLOOD GLUCOSE      POCT Blood Glucose.: 153 mg/dL (08 Jan 2020 19:06)  POCT Blood Glucose.: 100 mg/dL (08 Jan 2020 16:35)    Skin: stage 2 L gluteal crease, stage 2 sacrum, +1 generalized/dependent edema, +2 L knee edema, +3 extremity edema    Estimated Needs:   [X] no change since previous assessment  [ ] recalculated:     Previous Nutrition Diagnosis:   [ ] Inadequate Energy Intake [ ]Inadequate Oral Intake [ ] Excessive Energy Intake   [ ] Underweight [ ] Increased Nutrient Needs [ ] Overweight/Obesity   [ ] Altered GI Function [ ] Unintended Weight Loss [ ] Food & Nutrition Related Knowledge Deficit [X] Malnutrition     Nutrition Diagnosis is [X] ongoing  [ ] resolved [ ] not applicable     New Nutrition Diagnosis: [X] not applicable       Interventions:   Recommend  [X] Change Diet To: Lacto-Ovo Vegetarian.  [X] Nutrition Supplement: Ensure Enlive BID.  [ ] Nutrition Support  [X] Other: Resume diet as medically feasible. Encourage po intake, honor patient's preferences.     Monitoring and Evaluation:   [X] PO intake [ x ] Tolerance to diet prescription [ x ] weights [ x ] labs[ x ] follow up per protocol  [ ] other: Assessment: Pt seen for malnutrition follow up. Chart reviewed, hospital course noted. Pt s/p RRT transferred to ICU yesterday for SOB and desaturation.    Pt lethargic at this time. On high flow NC. C/O hunger, requesting for crackers. Pt noted with many food preferences and persistent lack of appetite. Pt with fluctuating diarrhea/constipation, per RN had formed BM overnight 1/8. GI following for colitis.    Factors impacting intake: [ ] none [ ] nausea  [ ] vomiting [ ] diarrhea [ ] constipation  [ ]chewing problems [ ] swallowing issues  [X] other: lethargy     Diet Presciption: Diet, NPO:   Except Medications (01-08-20 @ 21:05)    Intake: NPO at present but previously with poor appetite/intake. 0-25% past 2 days, 50-85% prior to that    Current Weight: 121.6 pounds (1/8)    Pertinent Medications: MEDICATIONS  (STANDING):  enoxaparin Injectable 40 milliGRAM(s) SubCutaneous daily  hydrocortisone 2.5% Rectal Cream 1 Application(s) Rectal two times a day  hydrocortisone hemorrhoidal Suppository 1 Suppository(s) Rectal two times a day  influenza   Vaccine 0.5 milliLiter(s) IntraMuscular once  lactated ringers. 1000 milliLiter(s) (80 mL/Hr) IV Continuous <Continuous>  lactobacillus acidophilus 1 Tablet(s) Oral three times a day with meals  lidocaine 2% Gel 1 Application(s) Topical three times a day  ondansetron Injectable 4 milliGRAM(s) IV Push once  OXcarbazepine 300 milliGRAM(s) Oral two times a day  pantoprazole    Tablet 40 milliGRAM(s) Oral before breakfast  piperacillin/tazobactam IVPB.. 3.375 Gram(s) IV Intermittent every 8 hours  propranolol 20 milliGRAM(s) Oral three times a day  sucralfate 1 Gram(s) Oral two times a day  tiotropium 18 MICROgram(s) Capsule 1 Capsule(s) Inhalation daily  witch hazel Pads 1 Application(s) Topical three times a day  zinc oxide 20% Ointment 1 Application(s) Topical two times a day    MEDICATIONS  (PRN):  acetaminophen   Tablet .. 650 milliGRAM(s) Oral every 6 hours PRN Temp greater or equal to 38C (100.4F), Mild Pain (1 - 3)  ALBUTerol    0.083% 2.5 milliGRAM(s) Nebulizer every 6 hours PRN Shortness of Breath and/or Wheezing  ondansetron    Tablet 4 milliGRAM(s) Oral every 12 hours PRN Nausea  simethicone 80 milliGRAM(s) Chew two times a day PRN Upset Stomach    Pertinent Labs: 01-09 Na137 mmol/L Glu 102 mg/dL<H> K+ 3.8 mmol/L Cr  0.63 mg/dL BUN 13 mg/dL 01-09 Phos 3.2 mg/dL 01-09 Alb 2.0 g/dL<L>     CAPILLARY BLOOD GLUCOSE      POCT Blood Glucose.: 153 mg/dL (08 Jan 2020 19:06)  POCT Blood Glucose.: 100 mg/dL (08 Jan 2020 16:35)    Skin: stage 2 L gluteal crease, stage 2 sacrum, +1 generalized/dependent edema, +2 L knee edema, +3 extremity edema    Estimated Needs:   [X] no change since previous assessment  [ ] recalculated:     Previous Nutrition Diagnosis:   [ ] Inadequate Energy Intake [ ]Inadequate Oral Intake [ ] Excessive Energy Intake   [ ] Underweight [ ] Increased Nutrient Needs [ ] Overweight/Obesity   [ ] Altered GI Function [ ] Unintended Weight Loss [ ] Food & Nutrition Related Knowledge Deficit [X] Malnutrition     Nutrition Diagnosis is [X] ongoing  [ ] resolved [ ] not applicable     New Nutrition Diagnosis: [X] not applicable       Interventions:   Recommend  [X] Change Diet To: Lacto-Ovo Vegetarian.  [X] Nutrition Supplement: Ensure Enlive BID.  [ ] Nutrition Support  [X] Other: Resume diet as medically feasible. Encourage po intake, honor patient's preferences.     Monitoring and Evaluation:   [X] PO intake [ x ] Tolerance to diet prescription [ x ] weights [ x ] labs[ x ] follow up per protocol  [ ] other: Assessment: Pt seen for malnutrition follow up. Chart reviewed, hospital course noted. Pt s/p RRT transferred to ICU yesterday for SOB and desaturation.    Pt lethargic at this time. On high flow NC. C/O hunger, requesting for crackers. Pt noted with many food preferences and persistent lack of appetite. Pt with fluctuating diarrhea/constipation, per RN had formed BM overnight 1/8. GI following for colitis.    Factors impacting intake: [ ] none [ ] nausea  [ ] vomiting [ ] diarrhea [ ] constipation  [ ]chewing problems [ ] swallowing issues  [X] other: lethargy     Diet Presciption: Diet, NPO:   Except Medications (01-08-20 @ 21:05)    Intake: NPO at present but previously with poor appetite/intake. 0-25% past 2 days, 50-85% prior to that    Current Weight: 121.6 pounds (1/8)    Pertinent Medications: MEDICATIONS  (STANDING):  enoxaparin Injectable 40 milliGRAM(s) SubCutaneous daily  hydrocortisone 2.5% Rectal Cream 1 Application(s) Rectal two times a day  hydrocortisone hemorrhoidal Suppository 1 Suppository(s) Rectal two times a day  influenza   Vaccine 0.5 milliLiter(s) IntraMuscular once  lactated ringers. 1000 milliLiter(s) (80 mL/Hr) IV Continuous <Continuous>  lactobacillus acidophilus 1 Tablet(s) Oral three times a day with meals  lidocaine 2% Gel 1 Application(s) Topical three times a day  ondansetron Injectable 4 milliGRAM(s) IV Push once  OXcarbazepine 300 milliGRAM(s) Oral two times a day  pantoprazole    Tablet 40 milliGRAM(s) Oral before breakfast  piperacillin/tazobactam IVPB.. 3.375 Gram(s) IV Intermittent every 8 hours  propranolol 20 milliGRAM(s) Oral three times a day  sucralfate 1 Gram(s) Oral two times a day  tiotropium 18 MICROgram(s) Capsule 1 Capsule(s) Inhalation daily  witch hazel Pads 1 Application(s) Topical three times a day  zinc oxide 20% Ointment 1 Application(s) Topical two times a day    MEDICATIONS  (PRN):  acetaminophen   Tablet .. 650 milliGRAM(s) Oral every 6 hours PRN Temp greater or equal to 38C (100.4F), Mild Pain (1 - 3)  ALBUTerol    0.083% 2.5 milliGRAM(s) Nebulizer every 6 hours PRN Shortness of Breath and/or Wheezing  ondansetron    Tablet 4 milliGRAM(s) Oral every 12 hours PRN Nausea  simethicone 80 milliGRAM(s) Chew two times a day PRN Upset Stomach    Pertinent Labs: 01-09 Na137 mmol/L Glu 102 mg/dL<H> K+ 3.8 mmol/L Cr  0.63 mg/dL BUN 13 mg/dL 01-09 Phos 3.2 mg/dL 01-09 Alb 2.0 g/dL<L>     CAPILLARY BLOOD GLUCOSE      POCT Blood Glucose.: 153 mg/dL (08 Jan 2020 19:06)  POCT Blood Glucose.: 100 mg/dL (08 Jan 2020 16:35)    Skin: stage 2 L gluteal crease, stage 2 sacrum, +1 generalized/dependent edema, +2 L knee edema, +3 extremity edema    Estimated Needs:   [X] no change since previous assessment  [ ] recalculated:     Previous Nutrition Diagnosis:   [ ] Inadequate Energy Intake [ ]Inadequate Oral Intake [ ] Excessive Energy Intake   [ ] Underweight [ ] Increased Nutrient Needs [ ] Overweight/Obesity   [ ] Altered GI Function [ ] Unintended Weight Loss [ ] Food & Nutrition Related Knowledge Deficit [X] Malnutrition     Nutrition Diagnosis is [X] ongoing  [ ] resolved [ ] not applicable     New Nutrition Diagnosis: [X] not applicable       Interventions:   Recommend  [X] Change Diet To: Lacto-Ovo Vegetarian.  [X] Nutrition Supplement: Ensure Enlive BID.  [ ] Nutrition Support  [X] Other: Resume diet as medically feasible. Discussed with ICU PA and pending verification placed. Encourage po intake, honor patient's preferences.     Monitoring and Evaluation:   [X] PO intake [ x ] Tolerance to diet prescription [ x ] weights [ x ] labs[ x ] follow up per protocol  [ ] other: Assessment: Pt seen for malnutrition follow up. Chart reviewed, hospital course noted. Pt s/p RRT transferred to ICU yesterday for SOB and desaturation.    Pt lethargic at this time. On high flow NC. C/O hunger, requesting for crackers. Pt noted with many food preferences and persistent lack of appetite. Pt with fluctuating diarrhea/constipation, per RN had formed BM overnight 1/8. GI following for colitis.    Factors impacting intake: [ ] none [ ] nausea  [ ] vomiting [ ] diarrhea [ ] constipation  [ ]chewing problems [ ] swallowing issues  [X] other: lethargy     Diet Presciption: Diet, NPO:   Except Medications (01-08-20 @ 21:05)    Intake: NPO at present but previously with poor appetite/intake. 0-25% past 2 days, 50-85% prior to that    Current Weight: 121.6 pounds (1/8)    Pertinent Medications: MEDICATIONS  (STANDING):  enoxaparin Injectable 40 milliGRAM(s) SubCutaneous daily  hydrocortisone 2.5% Rectal Cream 1 Application(s) Rectal two times a day  hydrocortisone hemorrhoidal Suppository 1 Suppository(s) Rectal two times a day  influenza   Vaccine 0.5 milliLiter(s) IntraMuscular once  lactated ringers. 1000 milliLiter(s) (80 mL/Hr) IV Continuous <Continuous>  lactobacillus acidophilus 1 Tablet(s) Oral three times a day with meals  lidocaine 2% Gel 1 Application(s) Topical three times a day  ondansetron Injectable 4 milliGRAM(s) IV Push once  OXcarbazepine 300 milliGRAM(s) Oral two times a day  pantoprazole    Tablet 40 milliGRAM(s) Oral before breakfast  piperacillin/tazobactam IVPB.. 3.375 Gram(s) IV Intermittent every 8 hours  propranolol 20 milliGRAM(s) Oral three times a day  sucralfate 1 Gram(s) Oral two times a day  tiotropium 18 MICROgram(s) Capsule 1 Capsule(s) Inhalation daily  witch hazel Pads 1 Application(s) Topical three times a day  zinc oxide 20% Ointment 1 Application(s) Topical two times a day    MEDICATIONS  (PRN):  acetaminophen   Tablet .. 650 milliGRAM(s) Oral every 6 hours PRN Temp greater or equal to 38C (100.4F), Mild Pain (1 - 3)  ALBUTerol    0.083% 2.5 milliGRAM(s) Nebulizer every 6 hours PRN Shortness of Breath and/or Wheezing  ondansetron    Tablet 4 milliGRAM(s) Oral every 12 hours PRN Nausea  simethicone 80 milliGRAM(s) Chew two times a day PRN Upset Stomach    Pertinent Labs: 01-09 Na137 mmol/L Glu 102 mg/dL<H> K+ 3.8 mmol/L Cr  0.63 mg/dL BUN 13 mg/dL 01-09 Phos 3.2 mg/dL 01-09 Alb 2.0 g/dL<L>     CAPILLARY BLOOD GLUCOSE      POCT Blood Glucose.: 153 mg/dL (08 Jan 2020 19:06)  POCT Blood Glucose.: 100 mg/dL (08 Jan 2020 16:35)    Skin: stage 2 L gluteal crease, stage 2 sacrum, +1 generalized/dependent edema, +2 L knee edema, +3 extremity edema    Estimated Needs:   [X] no change since previous assessment  [ ] recalculated:     Previous Nutrition Diagnosis:   [ ] Inadequate Energy Intake [ ]Inadequate Oral Intake [ ] Excessive Energy Intake   [ ] Underweight [ ] Increased Nutrient Needs [ ] Overweight/Obesity   [ ] Altered GI Function [ ] Unintended Weight Loss [ ] Food & Nutrition Related Knowledge Deficit [X] Malnutrition     Nutrition Diagnosis is [X] ongoing  [ ] resolved [ ] not applicable     New Nutrition Diagnosis: [X] not applicable       Interventions:   Recommend  [X] Change Diet To: Lacto-Ovo Vegetarian.  [X] Nutrition Supplement: Ensure Enlive BID.  [ ] Nutrition Support  [X] Other: Resume diet as medically feasible. Discussed with ICU NP and pending verification placed. Encourage po intake, honor patient's preferences.     Monitoring and Evaluation:   [X] PO intake [ x ] Tolerance to diet prescription [ x ] weights [ x ] labs[ x ] follow up per protocol  [ ] other:

## 2020-01-09 NOTE — PROGRESS NOTE ADULT - SUBJECTIVE AND OBJECTIVE BOX
Patient is a 59y old  Female who presents with a chief complaint of pneumothorax, colitis, UTI (09 Jan 2020 10:00)      FROM ADMISSION H+P:   HPI:  59F with PMH of interstitial lung disease hx of pneumothorax (on 2L NC at home), hx pulmonary nodules, Meniere's disease, Non-Hodgkin's lymphoma (SCD/XRT/chemo at [facility] 2003), hx of CVA (3/2019 - residual L sided weakness, unable to use her L arm), not on ASA or Plavix due to GIB, Seizure disorder, tachycardia, MVP, hx of chronic SDH, presents with weakness, weight loss, nausea, and diarrhea for the past month. Patient admits to 4 lb weight loss in 3 weeks, was seen by her PCP and started on Zofran one week ago. Patient reports going to wound care prior to admission for bed sores, and was advised ED evaluation. Per  at bedside, patient has been having loose BMs several times a day with foul smelling urine for 4 days. Denies fever. Patient also has felt too weak to get out of bed and started using a diaper to urinate/have BMs. Has mainly been wheelchair bound recently due to weakness, but at baseline patient is able to stand and walk short distances. Patient uses 2L NC at home and noticed her SpO2 at 75% on RA with ambulation. Patient was also switched to Oxcarbazepine 2-3 months ago and noticed her symptoms of anxiety, weakness, and "memory" have worsened.  Of note, at night when patient takes her Oxcarbazepine, she starts yelling and having "hallucinations."     In the ED: temp 97.6, HR 97, /80, RR 15, SpO2 92% RA, 98% on 3L NC. WBC 11.10. UA: moderate LEC, WBC 26-50, moderate bacteria. Chest x-ray: Interval enlargement of a right-sided pneumothorax. Small bilateral pleural effusions. Chronic lung fibrotic changes. CT chest/abd/pelvis: Moderate-sized right pneumothorax. Worsening groundglass opacities in both lower lobes, possibly infection. Stercoral colitis. Indeterminant hepatic lesions, possibly metastatic disease. Received IV Rocephin, IV Flagyl, 1L NS Bolus. EKG: NSR 88    Of note,  wants us to check oxcarbazepine levels. (26 Dec 2019 21:14)      ----  INTERVAL HPI/OVERNIGHT EVENTS: Pt seen and evaluated at the bedside. Patient was upgraded to ICU for episodes of tachycardia and desaturation.      ----  PAST MEDICAL & SURGICAL HISTORY:  Interstitial lung disease: on home o2 prn  NHL (non-Hodgkin's lymphoma): Agem 45 sp chemo/rt/stem cell  Transient cerebral ischemia, unspecified type  Mitral prolapse  History of tonsillectomy  History of appendectomy      FAMILY HISTORY:  Family history of stroke  Family history of breast cancer: Mother      Allergies    IV Contrast (Anaphylaxis)  shellfish. (Anaphylaxis)    Intolerances        ----  REVIEW OF SYSTEMS:  CONSTITUTIONAL: denies fever, chills, fatigue, weakness  HEENT: denies blurred vision, sore throat  SKIN: denies new lesions, rash  CARDIOVASCULAR: denies chest pain, chest pressure, palpitations  RESPIRATORY: denies shortness of breath, sputum production  GASTROINTESTINAL: denies nausea, vomiting, diarrhea, abdominal pain  GENITOURINARY: denies dysuria, discharge  NEUROLOGICAL: denies numbness, headache, focal weakness  MUSCULOSKELETAL: denies new joint pain, muscle aches  HEMATOLOGIC: denies gross bleeding, bruising  LYMPHATICS: denies enlarged lymph nodes, extremity swelling  PSYCHIATRIC: denies recent changes in anxiety, depression  ENDOCRINOLOGIC: denies sweating, cold or heat intolerance    ----  PHYSICAL EXAM:  GENERAL: patient appears well, no acute distress, appropriately interactive  EYES: sclera clear, no exudates  ENMT: oropharynx clear without erythema, moist mucous membranes  NECK: supple, soft, no thyromegaly noted  LUNGS: good air entry bilaterally, clear to auscultation, symmetric breath sounds, no wheezing or rhonchi appreciated  HEART: soft S1/S2, regular rate and rhythm, no murmurs noted, no noted edema to b/l LE  GASTROINTESTINAL: abdomen is soft, nontender, nondistended, normoactive bowel sounds, no palpable masses  INTEGUMENT: good skin turgor, appropriate for ethnicity, appears well perfused, no jaundice noted  MUSCULOSKELETAL: no clubbing or cyanosis, no obvious deformity  NEUROLOGIC: awake, alert, oriented x3, good muscle tone in 4 extremities, no obvious sensory deficits  PSYCHIATRIC: mood is good, affect is congruent with mood, linear and logical thought process  HEME/LYMPH: no palpable supraclavicular nodules, no obvious ecchymosis     T(C): 37.2 (01-09-20 @ 07:34), Max: 39.1 (01-08-20 @ 20:46)  HR: 87 (01-09-20 @ 06:00) (67 - 134)  BP: 141/75 (01-09-20 @ 06:00) (60/34 - 699/57)  RR: 29 (01-09-20 @ 06:00) (18 - 39)  SpO2: 97% (01-09-20 @ 06:00) (92% - 100%)  Wt(kg): --    ----  I&O's Summary    08 Jan 2020 07:01  -  09 Jan 2020 07:00  --------------------------------------------------------  IN: 1790 mL / OUT: 1200 mL / NET: 590 mL        LABS:                        10.5   9.16  )-----------( 451      ( 09 Jan 2020 05:38 )             34.1     01-09    137  |  96  |  13  ----------------------------<  102<H>  3.8   |  36<H>  |  0.63    Ca    8.8      09 Jan 2020 05:38  Phos  3.2     01-09  Mg     2.1     01-09    TPro  6.1  /  Alb  2.0<L>  /  TBili  0.2  /  DBili  <.10  /  AST  25  /  ALT  19  /  AlkPhos  81  01-09        CAPILLARY BLOOD GLUCOSE      POCT Blood Glucose.: 153 mg/dL (08 Jan 2020 19:06)  POCT Blood Glucose.: 100 mg/dL (08 Jan 2020 16:35)    ABG - ( 09 Jan 2020 10:10 )  pH, Arterial: 7.42  pH, Blood: x     /  pCO2: 56    /  pO2: 62    / HCO3: 33    / Base Excess: 10.2  /  SaO2: 91                01-08 @ 00:16   No growth to date.  --  --            ----  Personally reviewed:  Vital sign trends: [ x ] yes    [  ] no     [  ] n/a  Laboratory results: [ x ] yes    [  ] no     [  ] n/a  Radiology results: [ x ] yes    [  ] no     [  ] n/a  Culture results: [ x ] yes    [  ] no     [  ] n/a  Consultant recommendations: [ x ] yes    [  ] no     [  ] n/a Patient is a 59y old  Female who presents with a chief complaint of pneumothorax, colitis, UTI (09 Jan 2020 10:00)      FROM ADMISSION H+P:   HPI:  59F with PMH of interstitial lung disease hx of pneumothorax (on 2L NC at home), hx pulmonary nodules, Meniere's disease, Non-Hodgkin's lymphoma (SCD/XRT/chemo at [facility] 2003), hx of CVA (3/2019 - residual L sided weakness, unable to use her L arm), not on ASA or Plavix due to GIB, Seizure disorder, tachycardia, MVP, hx of chronic SDH, presents with weakness, weight loss, nausea, and diarrhea for the past month. Patient admits to 4 lb weight loss in 3 weeks, was seen by her PCP and started on Zofran one week ago. Patient reports going to wound care prior to admission for bed sores, and was advised ED evaluation. Per  at bedside, patient has been having loose BMs several times a day with foul smelling urine for 4 days. Denies fever. Patient also has felt too weak to get out of bed and started using a diaper to urinate/have BMs. Has mainly been wheelchair bound recently due to weakness, but at baseline patient is able to stand and walk short distances. Patient uses 2L NC at home and noticed her SpO2 at 75% on RA with ambulation. Patient was also switched to Oxcarbazepine 2-3 months ago and noticed her symptoms of anxiety, weakness, and "memory" have worsened.  Of note, at night when patient takes her Oxcarbazepine, she starts yelling and having "hallucinations."     In the ED: temp 97.6, HR 97, /80, RR 15, SpO2 92% RA, 98% on 3L NC. WBC 11.10. UA: moderate LEC, WBC 26-50, moderate bacteria. Chest x-ray: Interval enlargement of a right-sided pneumothorax. Small bilateral pleural effusions. Chronic lung fibrotic changes. CT chest/abd/pelvis: Moderate-sized right pneumothorax. Worsening groundglass opacities in both lower lobes, possibly infection. Stercoral colitis. Indeterminant hepatic lesions, possibly metastatic disease. Received IV Rocephin, IV Flagyl, 1L NS Bolus. EKG: NSR 88    Of note,  wants us to check oxcarbazepine levels. (26 Dec 2019 21:14)      ----  INTERVAL HPI/OVERNIGHT EVENTS:   A rapid response was called for an episode tachycardia and desaturation to 85% on 4LNC.  Patient was placed on a nonrebreather and eventually transferred to the ICU.  CT chest was done which was convincing for a new pneumonia.  Pt seen and evaluated at the bedside.    ----  PAST MEDICAL & SURGICAL HISTORY:  Interstitial lung disease: on home o2 prn  NHL (non-Hodgkin's lymphoma): Agem 45 sp chemo/rt/stem cell  Transient cerebral ischemia, unspecified type  Mitral prolapse  History of tonsillectomy  History of appendectomy      FAMILY HISTORY:  Family history of stroke  Family history of breast cancer: Mother      Allergies    IV Contrast (Anaphylaxis)  shellfish. (Anaphylaxis)    Intolerances        ----  REVIEW OF SYSTEMS:  CONSTITUTIONAL: denies fever, chills, fatigue, weakness  HEENT: denies blurred vision, sore throat  SKIN: denies new lesions, rash  CARDIOVASCULAR: denies chest pain, chest pressure, palpitations  RESPIRATORY: denies shortness of breath, sputum production  GASTROINTESTINAL: denies nausea, vomiting, diarrhea, abdominal pain  GENITOURINARY: denies dysuria, discharge  NEUROLOGICAL: denies numbness, headache, focal weakness  MUSCULOSKELETAL: denies new joint pain, muscle aches  HEMATOLOGIC: denies gross bleeding, bruising  LYMPHATICS: denies enlarged lymph nodes, extremity swelling  PSYCHIATRIC: denies recent changes in anxiety, depression  ENDOCRINOLOGIC: denies sweating, cold or heat intolerance    ----  PHYSICAL EXAM:  GENERAL: patient appears well, no acute distress, appropriately interactive  EYES: sclera clear, no exudates  ENMT: oropharynx clear without erythema, moist mucous membranes  NECK: supple, soft, no thyromegaly noted  LUNGS: good air entry bilaterally, clear to auscultation, symmetric breath sounds, no wheezing or rhonchi appreciated  HEART: soft S1/S2, regular rate and rhythm, no murmurs noted, no noted edema to b/l LE  GASTROINTESTINAL: abdomen is soft, nontender, nondistended, normoactive bowel sounds, no palpable masses  INTEGUMENT: good skin turgor, appropriate for ethnicity, appears well perfused, no jaundice noted  MUSCULOSKELETAL: no clubbing or cyanosis, no obvious deformity  NEUROLOGIC: awake, alert, oriented x3, good muscle tone in 4 extremities, no obvious sensory deficits  PSYCHIATRIC: mood is good, affect is congruent with mood, linear and logical thought process  HEME/LYMPH: no palpable supraclavicular nodules, no obvious ecchymosis     T(C): 37.2 (01-09-20 @ 07:34), Max: 39.1 (01-08-20 @ 20:46)  HR: 87 (01-09-20 @ 06:00) (67 - 134)  BP: 141/75 (01-09-20 @ 06:00) (60/34 - 699/57)  RR: 29 (01-09-20 @ 06:00) (18 - 39)  SpO2: 97% (01-09-20 @ 06:00) (92% - 100%)  Wt(kg): --    ----  I&O's Summary    08 Jan 2020 07:01  -  09 Jan 2020 07:00  --------------------------------------------------------  IN: 1790 mL / OUT: 1200 mL / NET: 590 mL        LABS:                        10.5   9.16  )-----------( 451      ( 09 Jan 2020 05:38 )             34.1     01-09    137  |  96  |  13  ----------------------------<  102<H>  3.8   |  36<H>  |  0.63    Ca    8.8      09 Jan 2020 05:38  Phos  3.2     01-09  Mg     2.1     01-09    TPro  6.1  /  Alb  2.0<L>  /  TBili  0.2  /  DBili  <.10  /  AST  25  /  ALT  19  /  AlkPhos  81  01-09        CAPILLARY BLOOD GLUCOSE      POCT Blood Glucose.: 153 mg/dL (08 Jan 2020 19:06)  POCT Blood Glucose.: 100 mg/dL (08 Jan 2020 16:35)    ABG - ( 09 Jan 2020 10:10 )  pH, Arterial: 7.42  pH, Blood: x     /  pCO2: 56    /  pO2: 62    / HCO3: 33    / Base Excess: 10.2  /  SaO2: 91                01-08 @ 00:16   No growth to date.  --  --            ----  Personally reviewed:  Vital sign trends: [ x ] yes    [  ] no     [  ] n/a  Laboratory results: [ x ] yes    [  ] no     [  ] n/a  Radiology results: [ x ] yes    [  ] no     [  ] n/a  Culture results: [ x ] yes    [  ] no     [  ] n/a  Consultant recommendations: [ x ] yes    [  ] no     [  ] n/a Patient is a 59y old  Female who presents with a chief complaint of pneumothorax, colitis, UTI (09 Jan 2020 10:00)      FROM ADMISSION H+P:   HPI:  59F with PMH of interstitial lung disease hx of pneumothorax (on 2L NC at home), hx pulmonary nodules, Meniere's disease, Non-Hodgkin's lymphoma (SCD/XRT/chemo at [facility] 2003), hx of CVA (3/2019 - residual L sided weakness, unable to use her L arm), not on ASA or Plavix due to GIB, Seizure disorder, tachycardia, MVP, hx of chronic SDH, presents with weakness, weight loss, nausea, and diarrhea for the past month. Patient admits to 4 lb weight loss in 3 weeks, was seen by her PCP and started on Zofran one week ago. Patient reports going to wound care prior to admission for bed sores, and was advised ED evaluation. Per  at bedside, patient has been having loose BMs several times a day with foul smelling urine for 4 days. Denies fever. Patient also has felt too weak to get out of bed and started using a diaper to urinate/have BMs. Has mainly been wheelchair bound recently due to weakness, but at baseline patient is able to stand and walk short distances. Patient uses 2L NC at home and noticed her SpO2 at 75% on RA with ambulation. Patient was also switched to Oxcarbazepine 2-3 months ago and noticed her symptoms of anxiety, weakness, and "memory" have worsened.  Of note, at night when patient takes her Oxcarbazepine, she starts yelling and having "hallucinations."     In the ED: temp 97.6, HR 97, /80, RR 15, SpO2 92% RA, 98% on 3L NC. WBC 11.10. UA: moderate LEC, WBC 26-50, moderate bacteria. Chest x-ray: Interval enlargement of a right-sided pneumothorax. Small bilateral pleural effusions. Chronic lung fibrotic changes. CT chest/abd/pelvis: Moderate-sized right pneumothorax. Worsening groundglass opacities in both lower lobes, possibly infection. Stercoral colitis. Indeterminant hepatic lesions, possibly metastatic disease. Received IV Rocephin, IV Flagyl, 1L NS Bolus. EKG: NSR 88    Of note,  wants us to check oxcarbazepine levels. (26 Dec 2019 21:14)      ----  INTERVAL HPI/OVERNIGHT EVENTS:   A rapid response was called for an episode tachycardia and desaturation to 85% on 4LNC.  Patient was placed on a nonrebreather and eventually transferred to the ICU.  CT chest was done which was convincing for a new pneumonia. Patient was placed on high flow and now saturated well and more comfortable.  Patient was seen and examined at bedside.  Patient reports she is feeling much better than yesterday, apologizing for her behavior.  Patient reports cough but denies any chest pain, SOB, abdominal pain, diarrhea, pain/burning with urination.      ----  PAST MEDICAL & SURGICAL HISTORY:  Interstitial lung disease: on home o2 prn  NHL (non-Hodgkin's lymphoma): Agem 45 sp chemo/rt/stem cell  Transient cerebral ischemia, unspecified type  Mitral prolapse  History of tonsillectomy  History of appendectomy      FAMILY HISTORY:  Family history of stroke  Family history of breast cancer: Mother      Allergies    IV Contrast (Anaphylaxis)  shellfish. (Anaphylaxis)    Intolerances        ----  REVIEW OF SYSTEMS:  CONSTITUTIONAL: denies fever, chills, fatigue, weakness  HEENT: denies blurred vision, sore throat  SKIN: denies new lesions, rash  CARDIOVASCULAR: denies chest pain, chest pressure, palpitations  RESPIRATORY: admits to cough, denies shortness of breath, sputum production  GASTROINTESTINAL: denies nausea, vomiting, diarrhea, abdominal pain  GENITOURINARY: denies dysuria, discharge  NEUROLOGICAL: denies numbness, headache, focal weakness  MUSCULOSKELETAL: denies new joint pain, muscle aches  HEMATOLOGIC: denies gross bleeding, bruising  LYMPHATICS: denies enlarged lymph nodes, extremity swelling  PSYCHIATRIC: denies recent changes in anxiety, depression  ENDOCRINOLOGIC: denies sweating, cold or heat intolerance    ----  PHYSICAL EXAM:  GENERAL: patient appears well, no acute distress, appropriately interactive  EYES: sclera clear, no exudates  ENMT: oropharynx clear without erythema, moist mucous membranes  NECK: supple, soft, no thyromegaly noted  LUNGS: on high flow, good air entry bilaterally, with b/l rhonchi auscultated in the middle, bases clear but decreased breath sounds.   HEART: soft S1/S2, regular rate and rhythm, no murmurs noted, no noted edema to b/l LE  GASTROINTESTINAL: abdomen is soft, nontender, nondistended, normoactive bowel sounds, no palpable masses  INTEGUMENT: good skin turgor, appropriate for ethnicity, appears well perfused, no jaundice noted  MUSCULOSKELETAL: no clubbing or cyanosis, no obvious deformity  NEUROLOGIC: awake, alert, oriented x3, good muscle tone in 3 extremities, contracted LUE  PSYCHIATRIC: mood is good, affect is congruent with mood, linear and logical thought process  HEME/LYMPH: no palpable supraclavicular nodules, no obvious ecchymosis     T(C): 37.2 (01-09-20 @ 07:34), Max: 39.1 (01-08-20 @ 20:46)  HR: 87 (01-09-20 @ 06:00) (67 - 134)  BP: 141/75 (01-09-20 @ 06:00) (60/34 - 699/57)  RR: 29 (01-09-20 @ 06:00) (18 - 39)  SpO2: 97% (01-09-20 @ 06:00) (92% - 100%)  Wt(kg): --    ----  I&O's Summary    08 Jan 2020 07:01  -  09 Jan 2020 07:00  --------------------------------------------------------  IN: 1790 mL / OUT: 1200 mL / NET: 590 mL        LABS:                        10.5   9.16  )-----------( 451      ( 09 Jan 2020 05:38 )             34.1     01-09    137  |  96  |  13  ----------------------------<  102<H>  3.8   |  36<H>  |  0.63    Ca    8.8      09 Jan 2020 05:38  Phos  3.2     01-09  Mg     2.1     01-09    TPro  6.1  /  Alb  2.0<L>  /  TBili  0.2  /  DBili  <.10  /  AST  25  /  ALT  19  /  AlkPhos  81  01-09        CAPILLARY BLOOD GLUCOSE      POCT Blood Glucose.: 153 mg/dL (08 Jan 2020 19:06)  POCT Blood Glucose.: 100 mg/dL (08 Jan 2020 16:35)    ABG - ( 09 Jan 2020 10:10 )  pH, Arterial: 7.42  pH, Blood: x     /  pCO2: 56    /  pO2: 62    / HCO3: 33    / Base Excess: 10.2  /  SaO2: 91                01-08 @ 00:16   No growth to date.  --  --            ----  Personally reviewed:  Vital sign trends: [ x ] yes    [  ] no     [  ] n/a  Laboratory results: [ x ] yes    [  ] no     [  ] n/a  Radiology results: [ x ] yes    [  ] no     [  ] n/a  Culture results: [ x ] yes    [  ] no     [  ] n/a  Consultant recommendations: [ x ] yes    [  ] no     [  ] n/a Patient is a 59y old  Female who presents with a chief complaint of pneumothorax, colitis, UTI (09 Jan 2020 10:00)      FROM ADMISSION H+P:   HPI:  59F with PMH of interstitial lung disease hx of pneumothorax (on 2L NC at home), hx pulmonary nodules, Meniere's disease, Non-Hodgkin's lymphoma (SCD/XRT/chemo at [facility] 2003), hx of CVA (3/2019 - residual L sided weakness, unable to use her L arm), not on ASA or Plavix due to GIB, Seizure disorder, tachycardia, MVP, hx of chronic SDH, presents with weakness, weight loss, nausea, and diarrhea for the past month. Patient admits to 4 lb weight loss in 3 weeks, was seen by her PCP and started on Zofran one week ago. Patient reports going to wound care prior to admission for bed sores, and was advised ED evaluation. Per  at bedside, patient has been having loose BMs several times a day with foul smelling urine for 4 days. Denies fever. Patient also has felt too weak to get out of bed and started using a diaper to urinate/have BMs. Has mainly been wheelchair bound recently due to weakness, but at baseline patient is able to stand and walk short distances. Patient uses 2L NC at home and noticed her SpO2 at 75% on RA with ambulation. Patient was also switched to Oxcarbazepine 2-3 months ago and noticed her symptoms of anxiety, weakness, and "memory" have worsened.  Of note, at night when patient takes her Oxcarbazepine, she starts yelling and having "hallucinations."    ----  INTERVAL HPI/OVERNIGHT EVENTS:   A rapid response was called for an episode tachycardia and desaturation to 85% on 4LNC.  Patient was placed on a nonrebreather and eventually transferred to the ICU.  CT chest was done which was convincing for a new pneumonia. Patient was placed on high flow and now saturated well and more comfortable.  Patient was seen and examined at bedside.  Patient reports she is feeling much better than yesterday, apologizing for her behavior.  Patient reports cough but denies any chest pain, SOB, abdominal pain, diarrhea, pain/burning with urination.      ----  PAST MEDICAL & SURGICAL HISTORY:  Interstitial lung disease: on home o2 prn  NHL (non-Hodgkin's lymphoma): Agem 45 sp chemo/rt/stem cell  Transient cerebral ischemia, unspecified type  Mitral prolapse  History of tonsillectomy  History of appendectomy      FAMILY HISTORY:  Family history of stroke  Family history of breast cancer: Mother      Allergies    IV Contrast (Anaphylaxis)  shellfish. (Anaphylaxis)    Intolerances        ----  REVIEW OF SYSTEMS:  CONSTITUTIONAL: denies fever, chills, fatigue, weakness  HEENT: denies blurred vision, sore throat  SKIN: denies new lesions, rash  CARDIOVASCULAR: denies chest pain, chest pressure, palpitations  RESPIRATORY: admits to cough, denies shortness of breath, sputum production  GASTROINTESTINAL: denies nausea, vomiting, diarrhea, abdominal pain  GENITOURINARY: denies dysuria, discharge  NEUROLOGICAL: denies numbness, headache, focal weakness  MUSCULOSKELETAL: denies new joint pain, muscle aches  HEMATOLOGIC: denies gross bleeding, bruising  LYMPHATICS: denies enlarged lymph nodes, extremity swelling  PSYCHIATRIC: denies recent changes in anxiety, depression  ENDOCRINOLOGIC: denies sweating, cold or heat intolerance    ----  PHYSICAL EXAM:  GENERAL: patient appears well, no acute distress, appropriately interactive  EYES: sclera clear, no exudates  ENMT: oropharynx clear without erythema, moist mucous membranes  NECK: supple, soft, no thyromegaly noted  LUNGS: on high flow, good air entry bilaterally, with b/l rhonchi auscultated in the middle, bases clear but decreased breath sounds.   HEART: soft S1/S2, regular rate and rhythm, no murmurs noted, no noted edema to b/l LE  GASTROINTESTINAL: abdomen is soft, nontender, nondistended, normoactive bowel sounds, no palpable masses  INTEGUMENT: good skin turgor, appropriate for ethnicity, appears well perfused, no jaundice noted  MUSCULOSKELETAL: no clubbing or cyanosis, no obvious deformity  NEUROLOGIC: awake, alert, oriented x3, good muscle tone in 3 extremities, contracted LUE  PSYCHIATRIC: mood is good, affect is congruent with mood, linear and logical thought process  HEME/LYMPH: no palpable supraclavicular nodules, no obvious ecchymosis     T(C): 37.2 (01-09-20 @ 07:34), Max: 39.1 (01-08-20 @ 20:46)  HR: 87 (01-09-20 @ 06:00) (67 - 134)  BP: 141/75 (01-09-20 @ 06:00) (60/34 - 699/57)  RR: 29 (01-09-20 @ 06:00) (18 - 39)  SpO2: 97% (01-09-20 @ 06:00) (92% - 100%)  Wt(kg): --    ----  I&O's Summary    08 Jan 2020 07:01  -  09 Jan 2020 07:00  --------------------------------------------------------  IN: 1790 mL / OUT: 1200 mL / NET: 590 mL        LABS:                        10.5   9.16  )-----------( 451      ( 09 Jan 2020 05:38 )             34.1     01-09    137  |  96  |  13  ----------------------------<  102<H>  3.8   |  36<H>  |  0.63    Ca    8.8      09 Jan 2020 05:38  Phos  3.2     01-09  Mg     2.1     01-09    TPro  6.1  /  Alb  2.0<L>  /  TBili  0.2  /  DBili  <.10  /  AST  25  /  ALT  19  /  AlkPhos  81  01-09        CAPILLARY BLOOD GLUCOSE      POCT Blood Glucose.: 153 mg/dL (08 Jan 2020 19:06)  POCT Blood Glucose.: 100 mg/dL (08 Jan 2020 16:35)    ABG - ( 09 Jan 2020 10:10 )  pH, Arterial: 7.42  pH, Blood: x     /  pCO2: 56    /  pO2: 62    / HCO3: 33    / Base Excess: 10.2  /  SaO2: 91                01-08 @ 00:16   No growth to date.  --  --            ----  Personally reviewed:  Vital sign trends: [ x ] yes    [  ] no     [  ] n/a  Laboratory results: [ x ] yes    [  ] no     [  ] n/a  Radiology results: [ x ] yes    [  ] no     [  ] n/a  Culture results: [ x ] yes    [  ] no     [  ] n/a  Consultant recommendations: [ x ] yes    [  ] no     [  ] n/a

## 2020-01-09 NOTE — PROGRESS NOTE ADULT - PROBLEM SELECTOR PLAN 3
- Continue Propranolol TID with hold parameters  - 1/2/20 patient had episodes of nonsustained tachycardia to the 120s (propanolol was held for low BP in the AM and afternoon).  Patient was given a bolus of fluid and received her third dose in the PM.  No other events on tele  - 1/6/20 patient had another episode of sbps in the 90s, patient was give 250ml bolus without improvement and another 500mL bolus.  - 1/8 patient with another episode of sbps in 90s will give maintenance fluids since patient is not eating/drinking much - Continue Propranolol TID with hold parameters

## 2020-01-09 NOTE — PROGRESS NOTE ADULT - PROBLEM SELECTOR PLAN 4
UA: moderate LEC, WBC 26-50, moderate bacteria. Patient reports foul smell, currently using diapers due to feeling weak  -s/p tx with ceftriaxone -Bacid TID, continue PPI, carafate  -GI, Dr. Hutchinson following  -will give simethicone for excess gas  -d/c'd metamucil  -patient declined colonoscopy

## 2020-01-09 NOTE — PROGRESS NOTE ADULT - ATTENDING COMMENTS
59F with Hx ILD (unknown etiology, no Bx), chronic hypoxemic respiratory failure, known chronic R PTX which has been conservatively managed, Hx NHL (s/p XRT, chemo and SCT), Hx suspected CVA with R sided weakness, Hx seizure, chronic SDH admitted 12/26 with colitis/proctitis and treated with CTX and flagyl.  Hospital course complicated by fever yesterday and zosyn started.  Overnight had episode of dyspnea, tachycardia, worsening hypoxemia.  CT chest showed PTX unchanged but with worsening parenchymal disease.      --continue trileptal for sz disorder  --had episodes of hypotension overnight but now hemodynamically stable  continue propranolol (home med for elevated HR per pt)  --hypoxemic respiratory failure suspect from HCAP   on high flow overnight but weaned to NC this afternoon  CT surgery reeval for PTX  no clear role for steroids for ILD at this point  --tolerating PO diet  --normal renal function  --HCAP, continue zosyn  check sputum Cx, UA and UCx, MRSA swab, urine legionella  --plan discussed with pt and daughter in detail  --discussed with ID and pulm (Dr. Rashid awaiting call back from Dr. Hazel to update)  --pt critically ill.  CC time 60min

## 2020-01-09 NOTE — PROGRESS NOTE ADULT - PROBLEM SELECTOR PLAN 6
patient reports chronic nausea for the past month. Had an EGD/Colonoscopy this year that was "negative" per patient  -Zofran PO PRN for nausea, pt refuses Reglan as long as QT permits as per GI recs, will continue with Zofran, patient with chronic nausea  -Nutrition recommended diet  -GI, Dr. De La Vega, following chronic  - Continue Oxcarbazepine BID  - Oxcarbazepine level - 20 (normal), will continue with current dosing at this time  - Neurology, Dr. Mccollum following

## 2020-01-09 NOTE — CONSULT NOTE ADULT - ASSESSMENT
59F with PMH of interstitial lung disease hx of pneumothorax (on 2L NC at home), hx pulmonary nodules, Meniere's disease, Non-Hodgkin's lymphoma (SCD/XRT/chemo at [facility] 2003), hx of CVA (3/2019 - residual L sided weakness, unable to use her L arm), not on ASA or Plavix due to GIB, Seizure disorder, tachycardia, MVP, hx of chronic SDH, presented and was admitted 12/26 with concern for proctitis Rxed and then observed off abx.      PT then had prolonged stay deferring dc and was observed abx until pt developed fever and AMS. Respiratory issues developed and pt started on Zosyn and ultimately transferred to ICU with some concerns that there may have been an aspiration event.    1/8-Zosyn started transfer to ICU

## 2020-01-09 NOTE — PROGRESS NOTE ADULT - PROBLEM SELECTOR PLAN 3
Advanced care planning was discussed with patient and family.  Advanced care planning forms were reviewed and discussed.  Risks, benefits and alternatives of gastroenterologic procedures were discussed in detail and all questions were answered.    30 minutes spent. on hi flow  cont abx  asp prec  no gi objection to diet  per pulm and icu

## 2020-01-09 NOTE — PROGRESS NOTE ADULT - PROBLEM SELECTOR PLAN 7
chronic  - Continue Oxcarbazepine BID  - Oxcarbazepine level - 20 (normal), will continue with current dosing at this time  - Neurology, Dr. Mccollum following chronic, stable  -Continue Pantoprazole daily

## 2020-01-09 NOTE — PROGRESS NOTE ADULT - PROBLEM SELECTOR PLAN 1
Fever not POA, Tmax 103, unclear etiology at this time, but likely hospital acquired PNA with hypoxia will continue to treat with Zosyn patient's mental status responded well to first 2 doses and escalation of oxygen delivery therapy.   - New chest xray shows worsened pleural effusion and ?worsened pneumothorax on right side, RVP negative, flu negative. denies burning urination/suprapubic pain  - CT abd/pelvis without oral contrast insignificant for infectious etiology  - ID consulted (Ermias) recommending continue with Zosyn.  - PT reeval once patient is more stable Fever not POA, Tmax 103, unclear etiology at this time, but likely hospital acquired PNA with hypoxia will continue to treat with Zosyn patient's mental status responded well to first 2 doses and escalation of oxygen delivery therapy.   - New chest xray shows worsened pleural effusion and ?worsened pneumothorax on right side (appears about stable on my read), RVP negative, flu negative. denies burning urination/suprapubic pain  - CT abd/pelvis without oral contrast insignificant for infectious etiology  - ID consulted (Ermias) recommending continue with Zosyn for now and that aspiration is in differential  - PT reeval once patient is more stable

## 2020-01-09 NOTE — PROGRESS NOTE ADULT - SUBJECTIVE AND OBJECTIVE BOX
Date/Time Patient Seen:  		  Referring MD:   Data Reviewed	       Patient is a 59y old  Female who presents with a chief complaint of pneumothorax, colitis, UTI (08 Jan 2020 23:12)      Subjective/HPI     PAST MEDICAL & SURGICAL HISTORY:  Interstitial lung disease: on home o2 prn  NHL (non-Hodgkin's lymphoma): Agem 43 sp chemo/rt/stem cell  Transient cerebral ischemia, unspecified type  Mitral prolapse  Pulmonary disease  History of tonsillectomy  History of appendectomy        Medication list         MEDICATIONS  (STANDING):  enoxaparin Injectable 40 milliGRAM(s) SubCutaneous daily  hydrocortisone 2.5% Rectal Cream 1 Application(s) Rectal two times a day  hydrocortisone hemorrhoidal Suppository 1 Suppository(s) Rectal two times a day  influenza   Vaccine 0.5 milliLiter(s) IntraMuscular once  lactated ringers. 1000 milliLiter(s) (80 mL/Hr) IV Continuous <Continuous>  lactobacillus acidophilus 1 Tablet(s) Oral three times a day with meals  lidocaine 2% Gel 1 Application(s) Topical three times a day  ondansetron Injectable 4 milliGRAM(s) IV Push once  OXcarbazepine 300 milliGRAM(s) Oral two times a day  pantoprazole    Tablet 40 milliGRAM(s) Oral before breakfast  piperacillin/tazobactam IVPB.. 3.375 Gram(s) IV Intermittent every 8 hours  propranolol 20 milliGRAM(s) Oral three times a day  sucralfate 1 Gram(s) Oral two times a day  tiotropium 18 MICROgram(s) Capsule 1 Capsule(s) Inhalation daily  witch hazel Pads 1 Application(s) Topical three times a day  zinc oxide 20% Ointment 1 Application(s) Topical two times a day    MEDICATIONS  (PRN):  acetaminophen   Tablet .. 650 milliGRAM(s) Oral every 6 hours PRN Temp greater or equal to 38C (100.4F), Mild Pain (1 - 3)  ALBUTerol    0.083% 2.5 milliGRAM(s) Nebulizer every 6 hours PRN Shortness of Breath and/or Wheezing  ondansetron    Tablet 4 milliGRAM(s) Oral every 12 hours PRN Nausea  simethicone 80 milliGRAM(s) Chew two times a day PRN Upset Stomach         Vitals log        ICU Vital Signs Last 24 Hrs  T(C): 37.2 (09 Jan 2020 07:34), Max: 39.1 (08 Jan 2020 20:46)  T(F): 98.9 (09 Jan 2020 07:34), Max: 102.3 (08 Jan 2020 20:46)  HR: 87 (09 Jan 2020 06:00) (67 - 134)  BP: 141/75 (09 Jan 2020 06:00) (60/34 - 699/57)  BP(mean): 102 (09 Jan 2020 06:00) (42 - 102)  ABP: --  ABP(mean): --  RR: 29 (09 Jan 2020 06:00) (18 - 39)  SpO2: 97% (09 Jan 2020 06:00) (92% - 100%)           Input and Output:  I&O's Detail    08 Jan 2020 07:01  -  09 Jan 2020 07:00  --------------------------------------------------------  IN:    Lactated Ringers IV Bolus: 500 mL    lactated ringers.: 800 mL    Oral Fluid: 340 mL    Solution: 150 mL  Total IN: 1790 mL    OUT:    Voided: 1200 mL  Total OUT: 1200 mL    Total NET: 590 mL          Lab Data                        10.5   9.16  )-----------( 451      ( 09 Jan 2020 05:38 )             34.1     01-09    137  |  96  |  13  ----------------------------<  102<H>  3.8   |  36<H>  |  0.63    Ca    8.8      09 Jan 2020 05:38  Phos  3.2     01-09  Mg     2.1     01-09    TPro  6.1  /  Alb  2.0<L>  /  TBili  0.2  /  DBili  <.10  /  AST  25  /  ALT  19  /  AlkPhos  81  01-09            Review of Systems	      Objective     Physical Examination    head at  heart s1s2  lung dec BS  abd soft      Pertinent Lab findings & Imaging      Shauna:  NO   Adequate UO     I&O's Detail    08 Jan 2020 07:01  -  09 Jan 2020 07:00  --------------------------------------------------------  IN:    Lactated Ringers IV Bolus: 500 mL    lactated ringers.: 800 mL    Oral Fluid: 340 mL    Solution: 150 mL  Total IN: 1790 mL    OUT:    Voided: 1200 mL  Total OUT: 1200 mL    Total NET: 590 mL               Discussed with:     Cultures:	        Radiology

## 2020-01-09 NOTE — PROGRESS NOTE ADULT - SUBJECTIVE AND OBJECTIVE BOX
INTERVAL HPI/OVERNIGHT EVENTS:  pt seen and examined in icu  interim events noted  on hi flow  denies n/v/d/abd pain  per rn had pasty bm x1 overnight    MEDICATIONS  (STANDING):  enoxaparin Injectable 40 milliGRAM(s) SubCutaneous daily  hydrocortisone 2.5% Rectal Cream 1 Application(s) Rectal two times a day  hydrocortisone hemorrhoidal Suppository 1 Suppository(s) Rectal two times a day  influenza   Vaccine 0.5 milliLiter(s) IntraMuscular once  lactated ringers. 1000 milliLiter(s) (80 mL/Hr) IV Continuous <Continuous>  lactobacillus acidophilus 1 Tablet(s) Oral three times a day with meals  lidocaine 2% Gel 1 Application(s) Topical three times a day  OXcarbazepine 300 milliGRAM(s) Oral two times a day  pantoprazole    Tablet 40 milliGRAM(s) Oral before breakfast  piperacillin/tazobactam IVPB.. 3.375 Gram(s) IV Intermittent every 8 hours  propranolol 20 milliGRAM(s) Oral three times a day  sucralfate 1 Gram(s) Oral two times a day  tiotropium 18 MICROgram(s) Capsule 1 Capsule(s) Inhalation daily  witch hazel Pads 1 Application(s) Topical three times a day  zinc oxide 20% Ointment 1 Application(s) Topical two times a day    MEDICATIONS  (PRN):  acetaminophen   Tablet .. 650 milliGRAM(s) Oral every 6 hours PRN Temp greater or equal to 38C (100.4F), Mild Pain (1 - 3)  ALBUTerol    0.083% 2.5 milliGRAM(s) Nebulizer every 6 hours PRN Shortness of Breath and/or Wheezing  ondansetron    Tablet 4 milliGRAM(s) Oral every 12 hours PRN Nausea  simethicone 80 milliGRAM(s) Chew two times a day PRN Gas      Allergies    IV Contrast (Anaphylaxis)  shellfish. (Anaphylaxis)    Intolerances        Review of Systems:    General:  No wt loss, fevers, chills, night sweats, fatigue   Eyes:  Good vision, no reported pain  ENT:  No sore throat, pain, runny nose, dysphagia  CV:  No pain, palpitations, hypo/hypertension  Resp: sob   GI:  No pain, No nausea, No vomiting, No diarrhea, No constipation, No weight loss, No fever, No pruritis, No rectal bleeding, No melena, No dysphagia  :  No pain, bleeding, incontinence, nocturia  Muscle:  No pain, weakness  Neuro:  No weakness, tingling, memory problems  Psych:  No fatigue, insomnia, mood problems, depression  Endocrine:  No polyuria, polydypsia, cold/heat intolerance  Heme:  No petechiae, ecchymosis, easy bruisability  Skin:  No rash, tattoos, scars, edema      Vital Signs Last 24 Hrs  T(C): 37.2 (09 Jan 2020 07:34), Max: 39.1 (08 Jan 2020 20:46)  T(F): 98.9 (09 Jan 2020 07:34), Max: 102.3 (08 Jan 2020 20:46)  HR: 87 (09 Jan 2020 06:00) (67 - 134)  BP: 141/75 (09 Jan 2020 06:00) (60/34 - 699/57)  BP(mean): 102 (09 Jan 2020 06:00) (42 - 102)  RR: 29 (09 Jan 2020 06:00) (18 - 39)  SpO2: 97% (09 Jan 2020 06:00) (92% - 100%)    PHYSICAL EXAM:    Constitutional: lying in bed  HEENT: ncat  Neck: No LAD  Gastrointestinal: soft nt nd  Extremities: No peripheral edema  Neurological: Awake alert responds appropriately    LABS:                        10.5   9.16  )-----------( 451      ( 09 Jan 2020 05:38 )             34.1     01-09    137  |  96  |  13  ----------------------------<  102<H>  3.8   |  36<H>  |  0.63    Ca    8.8      09 Jan 2020 05:38  Phos  3.2     01-09  Mg     2.1     01-09    TPro  6.1  /  Alb  2.0<L>  /  TBili  0.2  /  DBili  <.10  /  AST  25  /  ALT  19  /  AlkPhos  81  01-09          RADIOLOGY & ADDITIONAL TESTS:

## 2020-01-09 NOTE — CONSULT NOTE ADULT - PROBLEM SELECTOR RECOMMENDATION 9
chronic lung disease - ptx - new Right Side PTX - reviewed CXR on admission here and CT chest from [facility] - Oct 28th, there is no PTX on CT chest Oct 28th.  Pt would benefit from CT chest repeat today to eval new ptx - and progression of chronic lung disease - family and patient are hesitant  pt is not in acute resp distress - room air sat 88 - 91 pct, no evidence of increased work of breathing  will add o2  will rec CT chest non contrast  reviewed old records and today's CXR with family and patient and ER MD  pt follows with Dr. Bettye Hazel in [facility]  will follow  no need for immediate intervention on right PTX reported on CXR = HD and RESP STATUS stable - this may change suddenly - and pt should be admitted to monitored unit - pulse ox and cardiac monitoring
Episodes of NB diarrhea x1 week, stercocolitis found on CT, hx of diarrhea-predom IBS in past  - S/p two doses flagyl. Suspect diarrhea related to IBS rather than colitis, no current abd pain, leukocytosis improved.  - Continue rocephin for management of UTI  - Follow up stool studies  - Zofran PRN for nausea, monitor QTc periodically  - Continue daily protonix  - F/u BCx's from ED  - Diet as tolerated  - Monitor and replete lytes PRN  - Will continue to monitor
aspiration is a reasonable explanation in this context and concur with current therapy with Zosyn-will follow results of further testing with recs to follow.

## 2020-01-10 LAB
ANION GAP SERPL CALC-SCNC: 6 MMOL/L — SIGNIFICANT CHANGE UP (ref 5–17)
BASOPHILS # BLD AUTO: 0.02 K/UL — SIGNIFICANT CHANGE UP (ref 0–0.2)
BASOPHILS NFR BLD AUTO: 0.2 % — SIGNIFICANT CHANGE UP (ref 0–2)
BUN SERPL-MCNC: 9 MG/DL — SIGNIFICANT CHANGE UP (ref 7–23)
CALCIUM SERPL-MCNC: 8.9 MG/DL — SIGNIFICANT CHANGE UP (ref 8.5–10.1)
CHLORIDE SERPL-SCNC: 95 MMOL/L — LOW (ref 96–108)
CO2 SERPL-SCNC: 37 MMOL/L — HIGH (ref 22–31)
CREAT SERPL-MCNC: 0.47 MG/DL — LOW (ref 0.5–1.3)
EOSINOPHIL # BLD AUTO: 0.06 K/UL — SIGNIFICANT CHANGE UP (ref 0–0.5)
EOSINOPHIL NFR BLD AUTO: 0.6 % — SIGNIFICANT CHANGE UP (ref 0–6)
GLUCOSE SERPL-MCNC: 100 MG/DL — HIGH (ref 70–99)
HCT VFR BLD CALC: 32.9 % — LOW (ref 34.5–45)
HGB BLD-MCNC: 10.2 G/DL — LOW (ref 11.5–15.5)
IMM GRANULOCYTES NFR BLD AUTO: 0.5 % — SIGNIFICANT CHANGE UP (ref 0–1.5)
LEGIONELLA AG UR QL: NEGATIVE — SIGNIFICANT CHANGE UP
LYMPHOCYTES # BLD AUTO: 0.84 K/UL — LOW (ref 1–3.3)
LYMPHOCYTES # BLD AUTO: 9.1 % — LOW (ref 13–44)
MAGNESIUM SERPL-MCNC: 1.8 MG/DL — SIGNIFICANT CHANGE UP (ref 1.6–2.6)
MCHC RBC-ENTMCNC: 27.8 PG — SIGNIFICANT CHANGE UP (ref 27–34)
MCHC RBC-ENTMCNC: 31 GM/DL — LOW (ref 32–36)
MCV RBC AUTO: 89.6 FL — SIGNIFICANT CHANGE UP (ref 80–100)
MONOCYTES # BLD AUTO: 0.69 K/UL — SIGNIFICANT CHANGE UP (ref 0–0.9)
MONOCYTES NFR BLD AUTO: 7.4 % — SIGNIFICANT CHANGE UP (ref 2–14)
MRSA PCR RESULT.: SIGNIFICANT CHANGE UP
NEUTROPHILS # BLD AUTO: 7.61 K/UL — HIGH (ref 1.8–7.4)
NEUTROPHILS NFR BLD AUTO: 82.2 % — HIGH (ref 43–77)
NRBC # BLD: 0 /100 WBCS — SIGNIFICANT CHANGE UP (ref 0–0)
PHOSPHATE SERPL-MCNC: 2.5 MG/DL — SIGNIFICANT CHANGE UP (ref 2.5–4.5)
PLATELET # BLD AUTO: 478 K/UL — HIGH (ref 150–400)
POTASSIUM SERPL-MCNC: 3.3 MMOL/L — LOW (ref 3.5–5.3)
POTASSIUM SERPL-SCNC: 3.3 MMOL/L — LOW (ref 3.5–5.3)
RBC # BLD: 3.67 M/UL — LOW (ref 3.8–5.2)
RBC # FLD: 14.1 % — SIGNIFICANT CHANGE UP (ref 10.3–14.5)
S AUREUS DNA NOSE QL NAA+PROBE: DETECTED
SODIUM SERPL-SCNC: 138 MMOL/L — SIGNIFICANT CHANGE UP (ref 135–145)
WBC # BLD: 9.27 K/UL — SIGNIFICANT CHANGE UP (ref 3.8–10.5)
WBC # FLD AUTO: 9.27 K/UL — SIGNIFICANT CHANGE UP (ref 3.8–10.5)

## 2020-01-10 PROCEDURE — 99292 CRITICAL CARE ADDL 30 MIN: CPT

## 2020-01-10 PROCEDURE — 99233 SBSQ HOSP IP/OBS HIGH 50: CPT | Mod: GC

## 2020-01-10 PROCEDURE — 99291 CRITICAL CARE FIRST HOUR: CPT

## 2020-01-10 PROCEDURE — 93306 TTE W/DOPPLER COMPLETE: CPT | Mod: 26

## 2020-01-10 PROCEDURE — 99233 SBSQ HOSP IP/OBS HIGH 50: CPT

## 2020-01-10 RX ORDER — SODIUM,POTASSIUM PHOSPHATES 278-250MG
1 POWDER IN PACKET (EA) ORAL THREE TIMES A DAY
Refills: 0 | Status: DISCONTINUED | OUTPATIENT
Start: 2020-01-10 | End: 2020-01-21

## 2020-01-10 RX ORDER — MAGNESIUM SULFATE 500 MG/ML
2 VIAL (ML) INJECTION ONCE
Refills: 0 | Status: COMPLETED | OUTPATIENT
Start: 2020-01-10 | End: 2020-01-10

## 2020-01-10 RX ORDER — SODIUM CHLORIDE 9 MG/ML
1000 INJECTION, SOLUTION INTRAVENOUS
Refills: 0 | Status: DISCONTINUED | OUTPATIENT
Start: 2020-01-10 | End: 2020-01-11

## 2020-01-10 RX ADMIN — LIDOCAINE 1 APPLICATION(S): 4 CREAM TOPICAL at 21:40

## 2020-01-10 RX ADMIN — Medication 1 GRAM(S): at 17:28

## 2020-01-10 RX ADMIN — Medication 1 APPLICATION(S): at 21:40

## 2020-01-10 RX ADMIN — SODIUM CHLORIDE 3 MILLILITER(S): 9 INJECTION INTRAMUSCULAR; INTRAVENOUS; SUBCUTANEOUS at 20:04

## 2020-01-10 RX ADMIN — AER TRAVELER 1 APPLICATION(S): 0.5 SOLUTION RECTAL; TOPICAL at 14:06

## 2020-01-10 RX ADMIN — ONDANSETRON 4 MILLIGRAM(S): 8 TABLET, FILM COATED ORAL at 15:52

## 2020-01-10 RX ADMIN — LIDOCAINE 1 APPLICATION(S): 4 CREAM TOPICAL at 14:07

## 2020-01-10 RX ADMIN — SIMETHICONE 80 MILLIGRAM(S): 80 TABLET, CHEWABLE ORAL at 17:27

## 2020-01-10 RX ADMIN — PIPERACILLIN AND TAZOBACTAM 25 GRAM(S): 4; .5 INJECTION, POWDER, LYOPHILIZED, FOR SOLUTION INTRAVENOUS at 05:21

## 2020-01-10 RX ADMIN — DEXMEDETOMIDINE HYDROCHLORIDE IN 0.9% SODIUM CHLORIDE 4.08 MICROGRAM(S)/KG/HR: 4 INJECTION INTRAVENOUS at 21:41

## 2020-01-10 RX ADMIN — ENOXAPARIN SODIUM 40 MILLIGRAM(S): 100 INJECTION SUBCUTANEOUS at 11:49

## 2020-01-10 RX ADMIN — Medication 1 APPLICATION(S): at 10:01

## 2020-01-10 RX ADMIN — Medication 1 GRAM(S): at 05:21

## 2020-01-10 RX ADMIN — Medication 1 TABLET(S): at 17:28

## 2020-01-10 RX ADMIN — PANTOPRAZOLE SODIUM 40 MILLIGRAM(S): 20 TABLET, DELAYED RELEASE ORAL at 05:21

## 2020-01-10 RX ADMIN — ZINC OXIDE 1 APPLICATION(S): 200 OINTMENT TOPICAL at 17:29

## 2020-01-10 RX ADMIN — ZINC OXIDE 1 APPLICATION(S): 200 OINTMENT TOPICAL at 05:22

## 2020-01-10 RX ADMIN — ALBUTEROL 2.5 MILLIGRAM(S): 90 AEROSOL, METERED ORAL at 14:30

## 2020-01-10 RX ADMIN — OXCARBAZEPINE 300 MILLIGRAM(S): 300 TABLET, FILM COATED ORAL at 21:05

## 2020-01-10 RX ADMIN — Medication 50 GRAM(S): at 07:46

## 2020-01-10 RX ADMIN — TIOTROPIUM BROMIDE 1 CAPSULE(S): 18 CAPSULE ORAL; RESPIRATORY (INHALATION) at 11:50

## 2020-01-10 RX ADMIN — Medication 650 MILLIGRAM(S): at 14:16

## 2020-01-10 RX ADMIN — Medication 1 PACKET(S): at 21:39

## 2020-01-10 RX ADMIN — Medication 650 MILLIGRAM(S): at 14:45

## 2020-01-10 RX ADMIN — AER TRAVELER 1 APPLICATION(S): 0.5 SOLUTION RECTAL; TOPICAL at 21:40

## 2020-01-10 RX ADMIN — PIPERACILLIN AND TAZOBACTAM 25 GRAM(S): 4; .5 INJECTION, POWDER, LYOPHILIZED, FOR SOLUTION INTRAVENOUS at 14:06

## 2020-01-10 RX ADMIN — SODIUM CHLORIDE 3 MILLILITER(S): 9 INJECTION INTRAMUSCULAR; INTRAVENOUS; SUBCUTANEOUS at 14:30

## 2020-01-10 RX ADMIN — Medication 1 PACKET(S): at 07:46

## 2020-01-10 RX ADMIN — SODIUM CHLORIDE 80 MILLILITER(S): 9 INJECTION, SOLUTION INTRAVENOUS at 17:59

## 2020-01-10 RX ADMIN — Medication 1 SUPPOSITORY(S): at 17:28

## 2020-01-10 RX ADMIN — Medication 1 PACKET(S): at 14:07

## 2020-01-10 RX ADMIN — Medication 1 TABLET(S): at 11:49

## 2020-01-10 RX ADMIN — OXCARBAZEPINE 300 MILLIGRAM(S): 300 TABLET, FILM COATED ORAL at 10:00

## 2020-01-10 RX ADMIN — ALBUTEROL 2.5 MILLIGRAM(S): 90 AEROSOL, METERED ORAL at 20:03

## 2020-01-10 RX ADMIN — ALBUTEROL 2.5 MILLIGRAM(S): 90 AEROSOL, METERED ORAL at 07:37

## 2020-01-10 RX ADMIN — SODIUM CHLORIDE 3 MILLILITER(S): 9 INJECTION INTRAMUSCULAR; INTRAVENOUS; SUBCUTANEOUS at 07:37

## 2020-01-10 RX ADMIN — DEXMEDETOMIDINE HYDROCHLORIDE IN 0.9% SODIUM CHLORIDE 4.08 MICROGRAM(S)/KG/HR: 4 INJECTION INTRAVENOUS at 05:16

## 2020-01-10 RX ADMIN — PIPERACILLIN AND TAZOBACTAM 25 GRAM(S): 4; .5 INJECTION, POWDER, LYOPHILIZED, FOR SOLUTION INTRAVENOUS at 21:39

## 2020-01-10 RX ADMIN — Medication 1 TABLET(S): at 07:46

## 2020-01-10 NOTE — PROGRESS NOTE ADULT - SUBJECTIVE AND OBJECTIVE BOX
Patient is a 59y old  Female who presents with a chief complaint of pneumothorax, colitis, UTI (10 Mauricio 2020 10:45)      FROM ADMISSION H+P:   HPI:  59F with PMH of interstitial lung disease hx of pneumothorax (on 2L NC at home), hx pulmonary nodules, Meniere's disease, Non-Hodgkin's lymphoma (SCD/XRT/chemo at [facility] ), hx of CVA (3/2019 - residual L sided weakness, unable to use her L arm), not on ASA or Plavix due to GIB, Seizure disorder, tachycardia, MVP, hx of chronic SDH, presents with weakness, weight loss, nausea, and diarrhea for the past month. Patient admits to 4 lb weight loss in 3 weeks, was seen by her PCP and started on Zofran one week ago. Patient reports going to wound care prior to admission for bed sores, and was advised ED evaluation. Per  at bedside, patient has been having loose BMs several times a day with foul smelling urine for 4 days. Denies fever. Patient also has felt too weak to get out of bed and started using a diaper to urinate/have BMs. Has mainly been wheelchair bound recently due to weakness, but at baseline patient is able to stand and walk short distances. Patient uses 2L NC at home and noticed her SpO2 at 75% on RA with ambulation. Patient was also switched to Oxcarbazepine 2-3 months ago and noticed her symptoms of anxiety, weakness, and "memory" have worsened.  Of note, at night when patient takes her Oxcarbazepine, she starts yelling and having "hallucinations."     In the ED: temp 97.6, HR 97, /80, RR 15, SpO2 92% RA, 98% on 3L NC. WBC 11.10. UA: moderate LEC, WBC 26-50, moderate bacteria. Chest x-ray: Interval enlargement of a right-sided pneumothorax. Small bilateral pleural effusions. Chronic lung fibrotic changes. CT chest/abd/pelvis: Moderate-sized right pneumothorax. Worsening groundglass opacities in both lower lobes, possibly infection. Stercoral colitis. Indeterminant hepatic lesions, possibly metastatic disease. Received IV Rocephin, IV Flagyl, 1L NS Bolus. EKG: NSR 88    Of note,  wants us to check oxcarbazepine levels. (26 Dec 2019 21:14)      ----  INTERVAL HPI/OVERNIGHT EVENTS: Pt seen and evaluated at the bedside. Patient on high flow NC with episodes of tachypnea, dyspnea overnight.  Patient was started on precedex for anxiety. Seen by CT surgery and offered chest tube but patient family declined.  Now with persistent tachypnea, weakness, fatigue and reduced appetite.     ----  PAST MEDICAL & SURGICAL HISTORY:  Interstitial lung disease: on home o2 prn  NHL (non-Hodgkin's lymphoma): Agem 45 sp chemo/rt/stem cell  Transient cerebral ischemia, unspecified type  Mitral prolapse  History of tonsillectomy  History of appendectomy      FAMILY HISTORY:  Family history of stroke  Family history of breast cancer: Mother      Allergies    IV Contrast (Anaphylaxis)  shellfish. (Anaphylaxis)    Intolerances        ----  REVIEW OF SYSTEMS:  CONSTITUTIONAL: denies fever, chills, fatigue, weakness  HEENT: denies blurred vision, sore throat  SKIN: denies new lesions, rash  CARDIOVASCULAR: denies chest pain, chest pressure, palpitations  RESPIRATORY: denies shortness of breath, sputum production  GASTROINTESTINAL: denies nausea, vomiting, diarrhea, abdominal pain  GENITOURINARY: denies dysuria, discharge  NEUROLOGICAL: denies numbness, headache      ----  PHYSICAL EXAM:  GENERAL: patient appears well, no acute distress, appropriately interactive  EYES: sclera clear, no exudates  ENMT: oropharynx clear without erythema, moist mucous membranes  NECK: supple, soft, no thyromegaly noted  LUNGS: coarse rhonchi auscultated throughout L>R   HEART: soft S1/S2, regular rate and rhythm, no murmurs noted, no noted edema to b/l LE  GASTROINTESTINAL: abdomen is soft, nontender, nondistended, normoactive bowel sounds, no palpable masses  INTEGUMENT: good skin turgor, appropriate for ethnicity, appears well perfused, no jaundice noted  MUSCULOSKELETAL: no clubbing or cyanosis, no obvious deformity  NEUROLOGIC: awake, alert, oriented x3, good muscle tone in 3 extremities, LUE contracted      T(C): 36.6 (01-10-20 @ 12:00), Max: 37.7 (01-10-20 @ 00:16)  HR: 96 (01-10-20 @ 14:31) (79 - 121)  BP: 137/80 (01-10-20 @ 14:00) (78/49 - 188/86)  RR: 28 (01-10-20 @ 14:00) ( - 67)  SpO2: 94% (01-10-20 @ 14:31) (79% - 100%)  Wt(kg): --    ----  I&O's Summary    2020 07:  -  10 Mauricio 2020 07:00  --------------------------------------------------------  IN: 2279 mL / OUT: 750 mL / NET: 1529 mL    10 Mauricio 2020 07:  -  10 Mauricio 2020 15:21  --------------------------------------------------------  IN: 282 mL / OUT: 0 mL / NET: 282 mL        LABS:                        10.2   9.27  )-----------( 478      ( 10 Mauricio 2020 05:36 )             32.9     01-10    138  |  95<L>  |  9   ----------------------------<  100<H>  3.3<L>   |  37<H>  |  0.47<L>    Ca    8.9      10 Mauricio 2020 05:36  Phos  2.5     01-10  Mg     1.8     01-10    TPro  6.1  /  Alb  2.0<L>  /  TBili  0.2  /  DBili  <.10  /  AST  25  /  ALT  19  /  AlkPhos  81  01-09      Urinalysis Basic - ( 2020 13:41 )    Color: Yellow / Appearance: Slightly Turbid / S.015 / pH: x  Gluc: x / Ketone: Negative  / Bili: Negative / Urobili: Negative   Blood: x / Protein: 25 mg/dL / Nitrite: Negative   Leuk Esterase: Moderate / RBC: 6-10 /HPF / WBC 11-25   Sq Epi: x / Non Sq Epi: Few / Bacteria: Moderate      CAPILLARY BLOOD GLUCOSE        ABG - ( 2020 10:10 )  pH, Arterial: 7.42  pH, Blood: x     /  pCO2: 56    /  pO2: 62    / HCO3: 33    / Base Excess: 10.2  /  SaO2: 91                 @ 00:16   No growth to date.  --  --            ----  Personally reviewed:  Vital sign trends: [ x ] yes    [  ] no     [  ] n/a  Laboratory results: [ x ] yes    [  ] no     [  ] n/a  Radiology results: [ x ] yes    [  ] no     [  ] n/a  Culture results: [ x ] yes    [  ] no     [  ] n/a  Consultant recommendations: [ x ] yes    [  ] no     [  ] n/a Patient is a 59y old  Female who presents with a chief complaint of pneumothorax, colitis, UTI (10 Mauricio 2020 10:45)      FROM ADMISSION H+P:   HPI:  59F with PMH of interstitial lung disease hx of pneumothorax (on 2L NC at home), hx pulmonary nodules, Meniere's disease, Non-Hodgkin's lymphoma (SCD/XRT/chemo at [facility] ), hx of CVA (3/2019 - residual L sided weakness, unable to use her L arm), not on ASA or Plavix due to GIB, Seizure disorder, tachycardia, MVP, hx of chronic SDH, presents with weakness, weight loss, nausea, and diarrhea for the past month. Patient admits to 4 lb weight loss in 3 weeks, was seen by her PCP and started on Zofran one week ago. Patient reports going to wound care prior to admission for bed sores, and was advised ED evaluation. Per  at bedside, patient has been having loose BMs several times a day with foul smelling urine for 4 days. Denies fever. Patient also has felt too weak to get out of bed and started using a diaper to urinate/have BMs. Has mainly been wheelchair bound recently due to weakness, but at baseline patient is able to stand and walk short distances. Patient uses 2L NC at home and noticed her SpO2 at 75% on RA with ambulation. Patient was also switched to Oxcarbazepine 2-3 months ago and noticed her symptoms of anxiety, weakness, and "memory" have worsened.  Of note, at night when patient takes her Oxcarbazepine, she starts yelling and having "hallucinations"    ----  INTERVAL HPI/OVERNIGHT EVENTS: Pt seen and evaluated at the bedside. Patient on high flow NC with episodes of tachypnea, dyspnea overnight.  Patient was started on precedex for anxiety. Seen by CT surgery and offered chest tube but patient family declined.  Now with persistent tachypnea, weakness, fatigue and reduced appetite.     ----  PAST MEDICAL & SURGICAL HISTORY:  Interstitial lung disease: on home o2 prn  NHL (non-Hodgkin's lymphoma): Agem 43 sp chemo/rt/stem cell  Transient cerebral ischemia, unspecified type  Mitral prolapse  History of tonsillectomy  History of appendectomy      FAMILY HISTORY:  Family history of stroke  Family history of breast cancer: Mother      Allergies    IV Contrast (Anaphylaxis)  shellfish. (Anaphylaxis)    Intolerances        ----  REVIEW OF SYSTEMS:  CONSTITUTIONAL: denies fever, chills, fatigue, weakness  HEENT: denies blurred vision, sore throat  SKIN: denies new lesions, rash  CARDIOVASCULAR: denies chest pain, chest pressure, palpitations  RESPIRATORY: denies shortness of breath, sputum production  GASTROINTESTINAL: denies nausea, vomiting, diarrhea, abdominal pain  GENITOURINARY: denies dysuria, discharge  NEUROLOGICAL: denies numbness, headache  10 systems reviewed and negative unless otherwise noted     ----  PHYSICAL EXAM:  GENERAL: patient appears chronically ill and debilitated, response time dramatically slowed, speaking in simple 1 word answers, profoundly weak  ENMT: oropharynx clear without erythema, moist mucous membranes  NECK: supple, soft, no thyromegaly noted  LUNGS: coarse rhonchi auscultated throughout mid chest b/l but L>R   HEART: soft S1/S2, regular rate and rhythm, no murmurs noted, no noted edema to b/l LE  GASTROINTESTINAL: abdomen is soft, nontender, nondistended, normoactive bowel sounds, no palpable masses  INTEGUMENT: good skin turgor, appropriate for ethnicity, appears well perfused, no jaundice noted  MUSCULOSKELETAL: no clubbing or cyanosis, no obvious deformity  NEUROLOGIC: awake, alert, oriented x3, good muscle tone in 3 extremities, LUE wrist is contracted      T(C): 36.6 (01-10-20 @ 12:00), Max: 37.7 (01-10-20 @ 00:16)  HR: 96 (01-10-20 @ 14:31) (79 - 121)  BP: 137/80 (01-10-20 @ 14:00) (78/49 - 188/86)  RR: 28 (01-10-20 @ 14:00) (19 - 67)  SpO2: 94% (01-10-20 @ 14:31) (79% - 100%)  Wt(kg): --    ----  I&O's Summary    2020 07:01  -  10 Mauricio 2020 07:00  --------------------------------------------------------  IN: 2279 mL / OUT: 750 mL / NET: 1529 mL    10 Mauricio 2020 07:01  -  10 Mauricio 2020 15:21  --------------------------------------------------------  IN: 282 mL / OUT: 0 mL / NET: 282 mL        LABS:                        10.2   9.27  )-----------( 478      ( 10 Mauricio 2020 05:36 )             32.9     01-10    138  |  95<L>  |  9   ----------------------------<  100<H>  3.3<L>   |  37<H>  |  0.47<L>    Ca    8.9      10 Mauricio 2020 05:36  Phos  2.5     01-10  Mg     1.8     01-10    TPro  6.1  /  Alb  2.0<L>  /  TBili  0.2  /  DBili  <.10  /  AST  25  /  ALT  19  /  AlkPhos  81  01-09      Urinalysis Basic - ( 2020 13:41 )    Color: Yellow / Appearance: Slightly Turbid / S.015 / pH: x  Gluc: x / Ketone: Negative  / Bili: Negative / Urobili: Negative   Blood: x / Protein: 25 mg/dL / Nitrite: Negative   Leuk Esterase: Moderate / RBC: 6-10 /HPF / WBC 11-25   Sq Epi: x / Non Sq Epi: Few / Bacteria: Moderate      CAPILLARY BLOOD GLUCOSE        ABG - ( 2020 10:10 )  pH, Arterial: 7.42  pH, Blood: x     /  pCO2: 56    /  pO2: 62    / HCO3: 33    / Base Excess: 10.2  /  SaO2: 91                01-08 @ 00:16   No growth to date.  --  --            ----  Personally reviewed:  Vital sign trends: [ x ] yes    [  ] no     [  ] n/a  Laboratory results: [ x ] yes    [  ] no     [  ] n/a  Radiology results: [ x ] yes    [  ] no     [  ] n/a  Culture results: [ x ] yes    [  ] no     [  ] n/a  Consultant recommendations: [ x ] yes    [  ] no     [  ] n/a

## 2020-01-10 NOTE — GOALS OF CARE CONVERSATION - ADVANCED CARE PLANNING - CONVERSATION DETAILS
family meeting with daughter Eva (HCP) and  Marialuisa.  I discussed that the medical team is in agreement that we should be more aggressive (trial of steroids, CTA, chest tube) which will give her the best change of improvement.  As she is refusing all of these interventions it is hindering her care.  If she wants to be less aggressive and not pursue these treatments it would be most appropriate for her to be DNR/DNI.  I explained that if she requires intubation she will almost certainly require a chest tube, would likely not be weanable and require a trach.    Family seems to understand this.  They would also like her to agree to these interventions and a frustrated that she is refusing.  Eva will encourage pt to trial of steroids.

## 2020-01-10 NOTE — PROGRESS NOTE ADULT - PROBLEM SELECTOR PLAN 4
continue current Rx.  Over the weekend Dr. Anusha Roy will be covering for our group. If you have any questions please reach out to them at [phone].

## 2020-01-10 NOTE — PROGRESS NOTE ADULT - ASSESSMENT
59F with PMH of ILD (unknown type) seen by [facility] (Dr. Hazel) hx of pneumothorax (on 2L NC at home), hx pulmonary nodules, Meniere's disease, Non-Hodgkin's lymphoma (SCD/XRT/chemo at [facility] 2003), hx of CVA (3/2019 - residual L sided weakness, unable to use her L arm), not on ASA or Plavix due to GIB, Seizure disorder, tachycardia, MVP, hx of chronic SDH, presents with weakness, weight loss, nausea, and diarrhea admitted for R pneumothorax now with new fever and worsening PTX.     NEURO:   - tylenol for fever PRN and mild pain prn   -Trileptal for seizure d/o. f/u repeat Trileptal level. Discussed with Dr. Kulkarni [phone] regarding trileptal and other anti-epileptics. He recommended continuation of trileptal for seizure prophylaxis as long as the level is WNL. He considered Vimpat but insurance would not cover the medication. He has considered other antiepileptics (e.g. lamictal) but unfortunately, they would require taper and risk of Zach Roscoe's syndrome.   - Hx of CVA with left upper extremity paresis and overall left sided weakness. Continue LUE brace (2 hours on and 2 hours off)  - For anxiety, she is on precedex gtt.     PULM:    - Acute on chronic hypoxemic respiratory failure 2/2 worsening ILD with superimposing. Her saturation improved on HFNC 40/50%, SpO2 100%. Wean as tolerated as she is on 2L NC via O2.   - Will keep pigtail cath at bedside in the setting that pt decompensates. Patient appears to have trap lung on CT chest.   -Thoracic surgery Dr. France following.   - Dr. Gay Colon is following. Dr. Hazel (outpatient ILD doctor from [facility]) notified of patient's status by Dr. Rashid.   - Consider steroids if patient's respiratory status worsens but caution as patient states she received steroids for her Non-hodgkin's lymphoma and experienced steroid induced delirium.   - Continue Albuterol nebs as needed, spiriva daily,     CV:   -Continue propanolol for tachycardia, hx of MVP   - f/u repeat TTE     GI:    - Upgrade diet to CLD and upgrade as tolerated.   - Treated with flagyl on this admission for stercolitis. Can continue carafate, bacid, simethicone, Proctosol,     :   - renal function stable. Replete lytes as needed. On LR @80cc/hr x24 hours.     ID:   - Bcx NGTD (12/26 and 1/8)   - For suspected aspiration pneumonia causing worsening hypoxemic respiratory failure, monitor temperatures. Continue Zosyn IV q8 (day 2)   -f/u urine legionella, repeat UA, urine cx, MRSA/MSSA nares, sputum cx,     HEME/DVT PPX:   - Lovenox SQ DVT ppx     Full Code  Skin: no lines, no avendano 59F with PMH of ILD (unknown type) seen by [facility] (Dr. Hazel) hx of pneumothorax (on 2L NC at home), hx pulmonary nodules, Meniere's disease, Non-Hodgkin's lymphoma (SCD/XRT/chemo at [facility] 2003), hx of CVA (3/2019 - residual L sided weakness, unable to use her L arm), not on ASA or Plavix due to GIB, Seizure disorder, tachycardia, MVP, hx of chronic SDH, presents with weakness, weight loss, nausea, and diarrhea admitted for R pneumothorax now with new fever and worsening PTX.     NEURO:   - tylenol for fever PRN and mild pain prn   -Trileptal for seizure d/o. f/u repeat Trileptal level. Discussed with Dr. Kulkarni [phone] regarding trileptal and other anti-epileptics. He recommended continuation of trileptal for seizure prophylaxis as long as the level is WNL. He considered Vimpat but insurance would not cover the medication. He has considered other antiepileptics (e.g. lamictal) but unfortunately, they would require taper and risk of Zach Roscoe's syndrome.   - Hx of CVA with left upper extremity paresis and overall left sided weakness. Continue LUE brace (2 hours on and 2 hours off)  - For anxiety, she is on precedex gtt.     PULM:    - Acute on chronic hypoxemic respiratory failure 2/2 worsening ILD with superimposing. Her saturation improved on HFNC 40/50%, SpO2 100%. Wean as tolerated as she is on 2L NC via O2.   - Will keep pigtail cath at bedside in the setting that pt decompensates. Patient appears to have trap lung on CT chest.   -Thoracic surgery Dr. France following.   - Dr. Gay Colon is following. Dr. Hazel (outpatient ILD doctor from [facility]) notified of patient's status by Dr. Rashid.   - Consider steroids if patient's respiratory status worsens but caution as patient states she received steroids for her Non-hodgkin's lymphoma and experienced steroid induced delirium.   - Continue Albuterol nebs as needed, spiriva daily, hypertonic saline, mucomyst   - r/o PE, dopplers negative. Patient has IV contrast dye allergy (reported anaphylaxis) but is refusing premedication for CTA.     CV:   -Continue propanolol for tachycardia, hx of MVP   - f/u repeat TTE     GI:    - Continue soft diet, vegetarian. On PPI 40mg PO   - Treated with flagyl on this admission for stercolitis. Can continue carafate, bacid, simethicone, Proctosol     :   - renal function stable. Replete lytes as needed. On LR @80cc/hr x24 hours.     ID:   - Bcx NGTD (12/26 and 1/8)   - For suspected aspiration pneumonia causing worsening hypoxemic respiratory failure, monitor temperatures. Continue Zosyn IV q8 (day 2)   -f/u urine legionella, repeat UA, urine cx, MRSA/MSSA nares, sputum cx,     HEME/DVT PPX:   - Lovenox SQ DVT ppx     Full Code  Skin: no lines, no avendano 59F with PMH of ILD (unknown type) seen by [facility] (Dr. Hazel) hx of pneumothorax (on 2L NC at home), hx pulmonary nodules, Meniere's disease, Non-Hodgkin's lymphoma (SCD/XRT/chemo at [facility] 2003), hx of CVA (3/2019 - residual L sided weakness, unable to use her L arm), not on ASA or Plavix due to GIB, Seizure disorder, tachycardia, MVP, hx of chronic SDH, presents with weakness, weight loss, nausea, and diarrhea admitted for R pneumothorax now with new fever and worsening PTX.     NEURO:   - tylenol for fever PRN and mild pain prn   -Trileptal for seizure d/o. f/u repeat Trileptal level. Discussed with Dr. Kulkarni [phone] regarding trileptal and other anti-epileptics. He recommended continuation of trileptal for seizure prophylaxis as long as the level is WNL. He considered Vimpat but insurance would not cover the medication. He has considered other antiepileptics (e.g. lamictal) but unfortunately, they would require taper and risk of Zach Roscoe's syndrome.   - Hx of CVA with left upper extremity paresis and overall left sided weakness. Continue LUE brace (2 hours on and 2 hours off)  - For anxiety, she is on precedex gtt.     PULM:    - Acute on chronic hypoxemic respiratory failure 2/2 worsening ILD with superimposing. Her saturation improved on HFNC 40/50%, SpO2 100%. Wean as tolerated as she is on 2L NC via O2.   - Will keep pigtail cath at bedside in the setting that pt decompensates. Patient appears to have trap lung on CT chest.   - Thoracic surgery Dr. France following. Recs appreciated. Will recommend steroids as patient is refusing chest tube at this moment.   - Dr. Rashid Pulm is following. Dr. Hazel (outpatient ILD doctor from [facility]) notified of patient's status by Dr. Rashid.   - Consider steroids if patient's respiratory status worsens but caution as patient states she received steroids for her Non-hodgkin's lymphoma and experienced steroid induced delirium.   - Continue Albuterol nebs as needed, spiriva daily, hypertonic saline, mucomyst   - r/o PE, dopplers negative. Patient has IV contrast dye allergy (reported anaphylaxis) but is refusing premedication for CTA.     CV:   -Continue propanolol for tachycardia, hx of MVP   - f/u repeat TTE     GI:    - Continue soft diet, vegetarian. On PPI 40mg PO   - Treated with flagyl on this admission for stercolitis. Can continue carafate, bacid, simethicone, Proctosol   Will continue LR@80cc/hr for now as patient has decreased appetite and only eating small amounts of food.     :   - renal function stable. Replete lytes as needed. Will continue LR@80cc/hr     ID:   - Bcx NGTD (12/26 and 1/8)   - For suspected aspiration pneumonia causing worsening hypoxemic respiratory failure, monitor temperatures. Continue Zosyn IV q8 (day 2)   - f/u urine legionella, repeat UA, MRSA/MSSA nares, sputum cx    HEME/DVT PPX:   - Lovenox SQ DVT ppx     Full Code  Skin: no lines, no avendano

## 2020-01-10 NOTE — PROGRESS NOTE ADULT - SUBJECTIVE AND OBJECTIVE BOX
Date/Time Patient Seen:  		  Referring MD:   Data Reviewed	       Patient is a 59y old  Female who presents with a chief complaint of pneumothorax, colitis, UTI (09 Jan 2020 20:12)      Subjective/HPI     PAST MEDICAL & SURGICAL HISTORY:  Interstitial lung disease: on home o2 prn  NHL (non-Hodgkin's lymphoma): Agem 43 sp chemo/rt/stem cell  Transient cerebral ischemia, unspecified type  Mitral prolapse  Pulmonary disease  History of tonsillectomy  History of appendectomy        Medication list         MEDICATIONS  (STANDING):  dexMEDEtomidine Infusion 0.3 MICROgram(s)/kG/Hr (4.08 mL/Hr) IV Continuous <Continuous>  enoxaparin Injectable 40 milliGRAM(s) SubCutaneous daily  hydrocortisone 2.5% Rectal Cream 1 Application(s) Rectal two times a day  hydrocortisone hemorrhoidal Suppository 1 Suppository(s) Rectal two times a day  influenza   Vaccine 0.5 milliLiter(s) IntraMuscular once  lactated ringers. 1000 milliLiter(s) (80 mL/Hr) IV Continuous <Continuous>  lactobacillus acidophilus 1 Tablet(s) Oral three times a day with meals  lidocaine 2% Gel 1 Application(s) Topical three times a day  magnesium sulfate  IVPB 2 Gram(s) IV Intermittent once  OXcarbazepine 300 milliGRAM(s) Oral two times a day  pantoprazole    Tablet 40 milliGRAM(s) Oral before breakfast  piperacillin/tazobactam IVPB.. 3.375 Gram(s) IV Intermittent every 8 hours  potassium phosphate / sodium phosphate powder 1 Packet(s) Oral three times a day  propranolol 20 milliGRAM(s) Oral three times a day  sodium chloride 3%  Inhalation 3 milliLiter(s) Inhalation every 6 hours  sucralfate 1 Gram(s) Oral two times a day  tiotropium 18 MICROgram(s) Capsule 1 Capsule(s) Inhalation daily  witch hazel Pads 1 Application(s) Topical three times a day  zinc oxide 20% Ointment 1 Application(s) Topical two times a day    MEDICATIONS  (PRN):  acetaminophen   Tablet .. 650 milliGRAM(s) Oral every 6 hours PRN Temp greater or equal to 38C (100.4F), Mild Pain (1 - 3)  ALBUTerol    0.083% 2.5 milliGRAM(s) Nebulizer every 6 hours PRN Shortness of Breath and/or Wheezing  ondansetron    Tablet 4 milliGRAM(s) Oral every 12 hours PRN Nausea  simethicone 80 milliGRAM(s) Chew two times a day PRN Gas         Vitals log        ICU Vital Signs Last 24 Hrs  T(C): 37.1 (10 Mauricio 2020 04:17), Max: 37.7 (10 Mauricio 2020 00:16)  T(F): 98.7 (10 Mauricio 2020 04:17), Max: 99.8 (10 Mauricio 2020 00:16)  HR: 84 (10 Mauricio 2020 07:00) (79 - 121)  BP: 142/79 (10 Mauricio 2020 07:00) (78/49 - 188/86)  BP(mean): 104 (10 Mauricio 2020 07:00) (58 - 124)  ABP: --  ABP(mean): --  RR: 35 (10 Mauricio 2020 07:00) (24 - 67)  SpO2: 98% (10 Mauricio 2020 07:00) (86% - 100%)           Input and Output:  I&O's Detail    09 Jan 2020 07:01  -  10 Mauricio 2020 07:00  --------------------------------------------------------  IN:    dexmedetomidine Infusion: 44 mL    lactated ringers.: 1600 mL    Oral Fluid: 360 mL    Solution: 275 mL  Total IN: 2279 mL    OUT:    Voided: 750 mL  Total OUT: 750 mL    Total NET: 1529 mL          Lab Data                        10.2   9.27  )-----------( 478      ( 10 Mauricio 2020 05:36 )             32.9     01-10    138  |  95<L>  |  9   ----------------------------<  100<H>  3.3<L>   |  37<H>  |  0.47<L>    Ca    8.9      10 Mauricio 2020 05:36  Phos  2.5     01-10  Mg     1.8     01-10    TPro  6.1  /  Alb  2.0<L>  /  TBili  0.2  /  DBili  <.10  /  AST  25  /  ALT  19  /  AlkPhos  81  01-09    ABG - ( 09 Jan 2020 10:10 )  pH, Arterial: 7.42  pH, Blood: x     /  pCO2: 56    /  pO2: 62    / HCO3: 33    / Base Excess: 10.2  /  SaO2: 91                      Review of Systems	      Objective     Physical Examination    heart s1s2  lung dc BS  abd soft  on high flow NC      Pertinent Lab findings & Imaging      Shauna:  NO   Adequate UO     I&O's Detail    09 Jan 2020 07:01  -  10 Mauricio 2020 07:00  --------------------------------------------------------  IN:    dexmedetomidine Infusion: 44 mL    lactated ringers.: 1600 mL    Oral Fluid: 360 mL    Solution: 275 mL  Total IN: 2279 mL    OUT:    Voided: 750 mL  Total OUT: 750 mL    Total NET: 1529 mL               Discussed with:     Cultures:	        Radiology

## 2020-01-10 NOTE — PROGRESS NOTE ADULT - PROBLEM SELECTOR PLAN 4
-Bacid TID, continue PPI, carafate  -GI, Dr. Hutchinson following  -will give simethicone for excess gas  -d/c'd metamucil  -patient declined colonoscopy

## 2020-01-10 NOTE — PROGRESS NOTE ADULT - ATTENDING COMMENTS
I personally conducted a physical examination of the patient. I personally gathered the patient's history. I edited the above listed findings which were prepared by the listed resident physician. I personally discussed the plan of care with the patient. The questions and concerns were addressed to the best of my ability. The patient is in agreement with the listed treatment plan.     - CT held for today but still may need it. attempting to get CT chest w/ IV contrast to r/o PE  - discussed at length w/ family. introduced potential for palliative care approach to the pt and family and the counseling provided appeared to not be well received. will continue to coordinate care w/ [facility] and other consultants

## 2020-01-10 NOTE — PROGRESS NOTE ADULT - PROBLEM SELECTOR PLAN 1
Fever not POA, Tmax 103, most likely 2/2 hospital acquired pna with hypoxia will continue to treat with Zosyn patient's mental status has responded well, no fevers on Zosyn  - chest xray showed worsened pleural effusion and ?worsened pneumothorax on right side (appears about stable on my read), RVP negative, flu negative. denies burning urination/suprapubic pain  - CT abd/pelvis without oral contrast insignificant for infectious etiology  - ID consulted (Ermias) recommending continue with Zosyn for now and that aspiration is in differential  - PT reeval once patient is more stable Tmax 103, most likely 2/2 hospital acquired pna with hypoxia will continue to treat with Zosyn patient's mental status has responded rather well, afebrile while on zosyn  - chest xray showed worsened pleural effusion and ?worsened pneumothorax on right side (appears about stable on my read), RVP negative, flu negative. denies burning urination/suprapubic pain  - CT abd/pelvis without oral contrast insignificant for infectious etiology  - ID consulted (Ermias) recommending continue with Zosyn for now and that aspiration is in differential  - PT reeval once patient is more stable

## 2020-01-10 NOTE — PROGRESS NOTE ADULT - ATTENDING COMMENTS
59F with Hx ILD (unknown etiology, no Bx), chronic hypoxemic respiratory failure, known chronic R PTX which has been conservatively managed, Hx NHL (s/p XRT, chemo and SCT), Hx suspected CVA with R sided weakness, Hx seizure, chronic SDH admitted 12/26 with colitis/proctitis and treated with CTX and flagyl.  Hospital course complicated by development of HCAP, acute on chronic hypoxemic respiratory failure requiring high flow NC.  Overall unchanged today.    --continue trileptal for sz disorder  trileptal level on admission was WNL, will recheck  Dr. Arnett discussed with outpt neuro Dr. Wallace (see above resident note) who recommends to stay on trileptal  --hemodynamically stable  continue propranolol (home med for elevated HR per pt)  --hypoxemic respiratory failure suspect from HCAP   remains on high flow NC  suspect main contributor to hypoxemia is HCAP  continue duonebs, mucomyst, hypertonic for secretions clearance  DDx also includes PE and I have strongly recommended to pt that she have CTA with premedication for IV contrast allergy (see today's chart note, has received CTA in March without incident), currently she has not agreed but I urged to her consider and discuss with her family  --ILD, Dr. Rashid discussed with outpt Dr. 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  She is concerned about the psychosis-type symptoms she experienced in the past.  I emphasized that we stop the steroids if she has the side effects but she may benefit from them.  She adamantly refuses this.  --chronic R PTX, pt refused CT when recommended by thoracic surgery this am.    --tolerating PO diet though poor intake  --normal renal function  --HCAP, continue zosyn pending Cx data  --plan discussed in detail with pt, daughter Eva, , and sister  --family meeting with daughter Eva (HCP) and  Marialuisa.  I discussed that the medical team is in agreement that we should be more aggressive (trial of steroids, CTA, chest tube) which will give her the best change of improvement.  As she is refusing all of these interventions it is hindering her care.  If she wants to be less aggressive and not pursue these treatments it would be most appropriate for her to be DNR/DNI.  I explained that if she requires intubation she will almost certainly require a chest tube, would likely not be weanable and require a trach.    Family seems to understand this.  They would also like her to agree to these interventions and a frustrated that she is refusing.  Eva will encourage pt to trial of steroids.    --pt critically ill.  CC time 90min

## 2020-01-10 NOTE — PROGRESS NOTE ADULT - SUBJECTIVE AND OBJECTIVE BOX
Patient is a 59y old  Female who presents with a chief complaint of pneumothorax, colitis, UTI (10 Mauricio 2020 07:38)    24 hour events: ***    REVIEW OF SYSTEMS  Constitutional: No fever, chills, fatigue  Neuro: No headache, numbness, weakness  Resp: No cough, wheezing, shortness of breath  CVS: No chest pain, palpitations, leg swelling  GI: No abdominal pain, nausea, vomiting, diarrhea   : No dysuria, frequency, incontinence  Skin: No itching, burning, rashes, or lesions   Msk: No joint pain or swelling  Psych: No depression, anxiety, mood swings  Heme: No bleeding    T(F): 98.4 (01-10-20 @ 07:29), Max: 99.8 (01-10-20 @ 00:16)  HR: 88 (01-10-20 @ 08:00) (79 - 121)  BP: 135/80 (01-10-20 @ 08:00) (78/49 - 188/86)  RR: 27 (01-10-20 @ 08:00) (24 - 67)  SpO2: 93% (01-10-20 @ 08:00) (86% - 100%)  Wt(kg): --            I&O's Summary     @ 07:  -  01-10 @ 07:00  --------------------------------------------------------  IN: 2279 mL / OUT: 750 mL / NET: 1529 mL      PHYSICAL EXAM  General:   CNS:   HEENT:   Resp:   CVS:   Abd:   Ext:   Skin:     MEDICATIONS  piperacillin/tazobactam IVPB.. IV Intermittent    propranolol Oral      ALBUTerol    0.083% Nebulizer PRN  sodium chloride 3%  Inhalation Inhalation  tiotropium 18 MICROgram(s) Capsule Inhalation    acetaminophen   Tablet .. Oral PRN  dexMEDEtomidine Infusion IV Continuous  ondansetron    Tablet Oral PRN  OXcarbazepine Oral      enoxaparin Injectable SubCutaneous    pantoprazole    Tablet Oral  simethicone Chew PRN  sucralfate Oral      lactated ringers. IV Continuous  potassium phosphate / sodium phosphate powder Oral    influenza   Vaccine IntraMuscular    hydrocortisone 2.5% Rectal Cream Rectal  hydrocortisone hemorrhoidal Suppository Rectal  lidocaine 2% Gel Topical  witch hazel Pads Topical  zinc oxide 20% Ointment Topical    lactobacillus acidophilus Oral                          10.2   9.27  )-----------( 478      ( 10 Mauricio 2020 05:36 )             32.9       01-10    138  |  95<L>  |  9   ----------------------------<  100<H>  3.3<L>   |  37<H>  |  0.47<L>    Ca    8.9      10 Mauricio 2020 05:36  Phos  2.5     -10  Mg     1.8     01-10    TPro  6.1  /  Alb  2.0<L>  /  TBili  0.2  /  DBili  <.10  /  AST  25  /  ALT  19  /  AlkPhos  81              Urinalysis Basic - ( 2020 13:41 )    Color: Yellow / Appearance: Slightly Turbid / S.015 / pH: x  Gluc: x / Ketone: Negative  / Bili: Negative / Urobili: Negative   Blood: x / Protein: 25 mg/dL / Nitrite: Negative   Leuk Esterase: Moderate / RBC: 6-10 /HPF / WBC 11-25   Sq Epi: x / Non Sq Epi: Few / Bacteria: Moderate      .Blood Blood-Peripheral   No growth to date. --  @ 00:16    Rapid RVP Result: NotDetec ( @ 10:25)      CENTRAL LINE: Y/N          DATE INSERTED:              REMOVE: Y/N  LUKE: Y/N                        DATE INSERTED:              REMOVE: Y/N  A-LINE: Y/N                       DATE INSERTED:              REMOVE: Y/N    GLOBAL ISSUE/BEST PRACTICE  Analgesia:   Sedation:   CAM-ICU:   HOB elevation: yes  Stress ulcer prophylaxis:   VTE prophylaxis:   Glycemic control:   Nutrition:     CODE STATUS: ***  [facility] discussion: Y Patient is a 59y old  Female who presents with a chief complaint of pneumothorax, colitis, UTI (10 Mauricio 2020 07:38)    24 hour events: Overnight, the patient had insomnia,     REVIEW OF SYSTEMS  Constitutional: No fever, chills, fatigue  Neuro: No headache, numbness, weakness  Resp: No cough, wheezing, shortness of breath  CVS: No chest pain, palpitations, leg swelling  GI: No abdominal pain, nausea, vomiting, diarrhea   : No dysuria, frequency, incontinence  Skin: No itching, burning, rashes, or lesions   Msk: No joint pain or swelling  Psych: No depression, anxiety, mood swings  Heme: No bleeding    T(F): 98.4 (01-10-20 @ 07:29), Max: 99.8 (01-10-20 @ 00:16)  HR: 88 (01-10-20 @ 08:00) (79 - 121)  BP: 135/80 (01-10-20 @ 08:00) (78/49 - 188/86)  RR: 27 (01-10-20 @ 08:00) (24 - 67)  SpO2: 93% (01-10-20 @ 08:00) (86% - 100%)  Wt(kg): --            I&O's Summary     @ 07:01  -  01-10 @ 07:00  --------------------------------------------------------  IN: 2279 mL / OUT: 750 mL / NET: 1529 mL      PHYSICAL EXAM  General:   CNS:   HEENT:   Resp:   CVS:   Abd:   Ext:   Skin:     MEDICATIONS  piperacillin/tazobactam IVPB.. IV Intermittent    propranolol Oral      ALBUTerol    0.083% Nebulizer PRN  sodium chloride 3%  Inhalation Inhalation  tiotropium 18 MICROgram(s) Capsule Inhalation    acetaminophen   Tablet .. Oral PRN  dexMEDEtomidine Infusion IV Continuous  ondansetron    Tablet Oral PRN  OXcarbazepine Oral      enoxaparin Injectable SubCutaneous    pantoprazole    Tablet Oral  simethicone Chew PRN  sucralfate Oral      lactated ringers. IV Continuous  potassium phosphate / sodium phosphate powder Oral    influenza   Vaccine IntraMuscular    hydrocortisone 2.5% Rectal Cream Rectal  hydrocortisone hemorrhoidal Suppository Rectal  lidocaine 2% Gel Topical  witch hazel Pads Topical  zinc oxide 20% Ointment Topical    lactobacillus acidophilus Oral                          10.2   9.27  )-----------( 478      ( 10 Mauricio 2020 05:36 )             32.9       01-10    138  |  95<L>  |  9   ----------------------------<  100<H>  3.3<L>   |  37<H>  |  0.47<L>    Ca    8.9      10 Mauricio 2020 05:36  Phos  2.5     -10  Mg     1.8     01-10    TPro  6.1  /  Alb  2.0<L>  /  TBili  0.2  /  DBili  <.10  /  AST  25  /  ALT  19  /  AlkPhos  81              Urinalysis Basic - ( 2020 13:41 )    Color: Yellow / Appearance: Slightly Turbid / S.015 / pH: x  Gluc: x / Ketone: Negative  / Bili: Negative / Urobili: Negative   Blood: x / Protein: 25 mg/dL / Nitrite: Negative   Leuk Esterase: Moderate / RBC: 6-10 /HPF / WBC 11-25   Sq Epi: x / Non Sq Epi: Few / Bacteria: Moderate      .Blood Blood-Peripheral   No growth to date. --  @ 00:16    Rapid RVP Result: NotDetec ( @ 10:25)      CENTRAL LINE: Y/N          DATE INSERTED:              REMOVE: Y/N  LUKE: Y/N                        DATE INSERTED:              REMOVE: Y/N  A-LINE: Y/N                       DATE INSERTED:              REMOVE: Y/N    GLOBAL ISSUE/BEST PRACTICE  Analgesia:   Sedation:   CAM-ICU:   HOB elevation: yes  Stress ulcer prophylaxis:   VTE prophylaxis:   Glycemic control:   Nutrition:     CODE STATUS: ***  [facility] discussion: Y Patient is a 59y old  Female who presents with a chief complaint of pneumothorax, colitis, UTI (10 Mauricio 2020 07:38)    24 hour events: Overnight, the patient had insomnia and was given tylenol and started on precedex gtt. This morning     REVIEW OF SYSTEMS  Constitutional: No fever, chills, fatigue  Neuro: No headache, numbness, weakness  Resp: No cough, wheezing, shortness of breath  CVS: Initially complained of chest pain in the morning and then it resolved.   GI: No abdominal pain, nausea, vomiting, diarrhea   : No dysuria, frequency, incontinence  Skin: No itching, burning, rashes, or lesions   Msk: No joint pain or swelling  Psych: No depression, anxiety, mood swings  Heme: No bleeding    T(F): 98.4 (01-10-20 @ 07:29), Max: 99.8 (01-10-20 @ 00:16)  HR: 88 (01-10-20 @ 08:00) (79 - 121)  BP: 135/80 (01-10-20 @ 08:00) (78/49 - 188/86)  RR: 27 (01-10-20 @ 08:00) (24 - 67)  SpO2: 93% (01-10-20 @ 08:00) (86% - 100%)  Wt(kg): --            I&O's Summary     @ 07:01  -  01-10 @ 07:00  --------------------------------------------------------  IN: 2279 mL / OUT: 750 mL / NET: 1529 mL      PHYSICAL EXAM  General:   CNS:   HEENT:   Resp:   CVS:   Abd:   Ext:   Skin:     MEDICATIONS  piperacillin/tazobactam IVPB.. IV Intermittent    propranolol Oral      ALBUTerol    0.083% Nebulizer PRN  sodium chloride 3%  Inhalation Inhalation  tiotropium 18 MICROgram(s) Capsule Inhalation    acetaminophen   Tablet .. Oral PRN  dexMEDEtomidine Infusion IV Continuous  ondansetron    Tablet Oral PRN  OXcarbazepine Oral      enoxaparin Injectable SubCutaneous    pantoprazole    Tablet Oral  simethicone Chew PRN  sucralfate Oral      lactated ringers. IV Continuous  potassium phosphate / sodium phosphate powder Oral    influenza   Vaccine IntraMuscular    hydrocortisone 2.5% Rectal Cream Rectal  hydrocortisone hemorrhoidal Suppository Rectal  lidocaine 2% Gel Topical  witch hazel Pads Topical  zinc oxide 20% Ointment Topical    lactobacillus acidophilus Oral                          10.2   9.27  )-----------( 478      ( 10 Mauricio 2020 05:36 )             32.9       01-10    138  |  95<L>  |  9   ----------------------------<  100<H>  3.3<L>   |  37<H>  |  0.47<L>    Ca    8.9      10 Mauricio 2020 05:36  Phos  2.5     -10  Mg     1.8     01-10    TPro  6.1  /  Alb  2.0<L>  /  TBili  0.2  /  DBili  <.10  /  AST  25  /  ALT  19  /  AlkPhos  81              Urinalysis Basic - ( 2020 13:41 )    Color: Yellow / Appearance: Slightly Turbid / S.015 / pH: x  Gluc: x / Ketone: Negative  / Bili: Negative / Urobili: Negative   Blood: x / Protein: 25 mg/dL / Nitrite: Negative   Leuk Esterase: Moderate / RBC: 6-10 /HPF / WBC 11-25   Sq Epi: x / Non Sq Epi: Few / Bacteria: Moderate      .Blood Blood-Peripheral   No growth to date. --  @ 00:16    Rapid RVP Result: NotDetec ( @ 10:25)      CENTRAL LINE: Y/N          DATE INSERTED:              REMOVE: Y/N  LUKE: Y/N                        DATE INSERTED:              REMOVE: Y/N  A-LINE: Y/N                       DATE INSERTED:              REMOVE: Y/N    GLOBAL ISSUE/BEST PRACTICE  Analgesia:   Sedation:   CAM-ICU:   HOB elevation: yes  Stress ulcer prophylaxis:   VTE prophylaxis:   Glycemic control:   Nutrition:     CODE STATUS: ***  [facility] discussion: ABIDA Patient is a 59y old  Female who presents with a chief complaint of pneumothorax, colitis, UTI (10 Mauricio 2020 07:38)    24 hour events: Overnight, the patient was anxious and was given tylenol and started on precedex gtt. This morning she was seen and examined c/o insomnia. She initially complained of chest pain this morning, work of breathing, CXR was ordered and then patient stated her chest pain resolved and sister did not want her to get CXR. When asked about chest tube recommendation from Dr. France, the patient did not recall being told about the chest tube.     REVIEW OF SYSTEMS  Constitutional: No fever, chills, fatigue  Neuro: c/o weakness, No headache, numbness   Resp: No cough, wheezing, shortness of breath  CVS: Initially complained of chest pain in the morning and then it resolved.   GI: complained of nausea   : No dysuria, frequency, incontinence  Skin: No itching, burning, rashes, or lesions   Msk: No joint pain or swelling  Psych: anxious   Heme: No bleeding    T(F): 98.4 (01-10-20 @ 07:29), Max: 99.8 (01-10-20 @ 00:16)  HR: 88 (01-10-20 @ 08:00) (79 - 121)  BP: 135/80 (01-10-20 @ 08:00) (78/49 - 188/86)  RR: 27 (01-10-20 @ 08:00) (24 - 67)  SpO2: 93% (01-10-20 @ 08:00) (86% - 100%)  Wt(kg): --    I&O's Summary     @ :  -  01-10 @ 07:00  --------------------------------------------------------  IN: 2279 mL / OUT: 750 mL / NET: 1529 mL      PHYSICAL EXAM  General: Middle age female, appears older than stated age, frail, ill appearing  CNS: awake, alert, oriented x3, sensation fully intact in all 4 extremities  HEENT: NC/AT, pupils equal and reactive to light b/l, MMM  Resp: diminished breath sounds b/l (Left>right), coarse crackles thoughout  CVS: s1,s2, systolic murmur auscultated  Abd: soft, non-tender, BSx4   Ext: left upper extremity in brace as hand is pointed outward, no c/c/e  Skin: warm, dry   MEDICATIONS  piperacillin/tazobactam IVPB.. IV Intermittent    propranolol Oral      ALBUTerol    0.083% Nebulizer PRN  sodium chloride 3%  Inhalation Inhalation  tiotropium 18 MICROgram(s) Capsule Inhalation    acetaminophen   Tablet .. Oral PRN  dexMEDEtomidine Infusion IV Continuous  ondansetron    Tablet Oral PRN  OXcarbazepine Oral      enoxaparin Injectable SubCutaneous    pantoprazole    Tablet Oral  simethicone Chew PRN  sucralfate Oral      lactated ringers. IV Continuous  potassium phosphate / sodium phosphate powder Oral    influenza   Vaccine IntraMuscular    hydrocortisone 2.5% Rectal Cream Rectal  hydrocortisone hemorrhoidal Suppository Rectal  lidocaine 2% Gel Topical  witch hazel Pads Topical  zinc oxide 20% Ointment Topical    lactobacillus acidophilus Oral                          10.2   9.27  )-----------( 478      ( 10 Mauricio 2020 05:36 )             32.9       01-10    138  |  95<L>  |  9   ----------------------------<  100<H>  3.3<L>   |  37<H>  |  0.47<L>    Ca    8.9      10 Mauricio 2020 05:36  Phos  2.5     01-10  Mg     1.8     01-10    TPro  6.1  /  Alb  2.0<L>  /  TBili  0.2  /  DBili  <.10  /  AST  25  /  ALT  19  /  AlkPhos  81  -09            Urinalysis Basic - ( 2020 13:41 )    Color: Yellow / Appearance: Slightly Turbid / S.015 / pH: x  Gluc: x / Ketone: Negative  / Bili: Negative / Urobili: Negative   Blood: x / Protein: 25 mg/dL / Nitrite: Negative   Leuk Esterase: Moderate / RBC: 6-10 /HPF / WBC 11-25   Sq Epi: x / Non Sq Epi: Few / Bacteria: Moderate      .Blood Blood-Peripheral   No growth to date. --  @ 00:16    Rapid RVP Result: NotDetec ( @ 10:25)      CENTRAL LINE: Y/N          DATE INSERTED:              REMOVE: Y/N  LUKE: Y/N                        DATE INSERTED:              REMOVE: Y/N  A-LINE: Y/N                       DATE INSERTED:              REMOVE: Y/N    GLOBAL ISSUE/BEST PRACTICE  Analgesia:   Sedation:   CAM-ICU:   HOB elevation: yes  Stress ulcer prophylaxis:   VTE prophylaxis:   Glycemic control:   Nutrition:     CODE STATUS: ***  [facility] discussion: Y Patient is a 59y old  Female who presents with a chief complaint of pneumothorax, colitis, UTI (10 Mauricio 2020 07:38)    24 hour events: Overnight, the patient was anxious and was given tylenol and started on precedex gtt. This morning she was seen and examined c/o insomnia. She initially complained of chest pain this morning and increased work of breathing, CXR was ordered and then patient stated her chest pain resolved and sister did not want her to get CXR. When asked about chest tube recommendation from Dr. France, the patient did not recall being told about the chest tube.     REVIEW OF SYSTEMS  Constitutional: No fever, chills, fatigue  Neuro: c/o weakness, No headache, numbness   Resp: No cough, wheezing, shortness of breath  CVS: Initially complained of chest pain in the morning and then it resolved.   GI: complained of nausea   : No dysuria, frequency, incontinence  Skin: No itching, burning, rashes, or lesions   Msk: No joint pain or swelling  Psych: anxious   Heme: No bleeding    T(F): 98.4 (01-10-20 @ 07:29), Max: 99.8 (01-10-20 @ 00:16)  HR: 88 (01-10-20 @ 08:00) (79 - 121)  BP: 135/80 (01-10-20 @ 08:00) (78/49 - 188/86)  RR: 27 (01-10-20 @ 08:00) (24 - 67)  SpO2: 93% (01-10-20 @ 08:00) (86% - 100%)  Wt(kg): --    I&O's Summary     @ 07:  -  01-10 @ 07:00  --------------------------------------------------------  IN: 2279 mL / OUT: 750 mL / NET: 1529 mL      PHYSICAL EXAM  General: Middle age female, appears older than stated age, frail, ill appearing, voice sounds hoarse today   CNS: awake, alert, oriented x3, sensation fully intact in all 4 extremities  HEENT: NC/AT, pupils equal and reactive to light b/l, MMM  Resp: diminished breath sounds b/l (Left>right), coarse crackles have improved  CVS: s1,s2, systolic murmur auscultated  Abd: soft, non-tender, BSx4   Ext: left upper extremity in brace as hand is pointed outward, no c/c/e  Skin: warm, dry     MEDICATIONS  piperacillin/tazobactam IVPB.. IV Intermittent  propranolol Oral  ALBUTerol    0.083% Nebulizer PRN  sodium chloride 3%  Inhalation Inhalation  tiotropium 18 MICROgram(s) Capsule Inhalation  acetaminophen   Tablet .. Oral PRN  dexMEDEtomidine Infusion IV Continuous  ondansetron    Tablet Oral PRN  OXcarbazepine Oral  enoxaparin Injectable SubCutaneous  pantoprazole    Tablet Oral  simethicone Chew PRN  sucralfate Oral  lactated ringers. IV Continuous  potassium phosphate / sodium phosphate powder Oral  influenza   Vaccine IntraMuscular  hydrocortisone 2.5% Rectal Cream Rectal  hydrocortisone hemorrhoidal Suppository Rectal  lidocaine 2% Gel Topical  witch hazel Pads Topical  zinc oxide 20% Ointment Topical  lactobacillus acidophilus Oral                          10.2   9.27  )-----------( 478      ( 10 Mauricio 2020 05:36 )             32.9       01-10    138  |  95<L>  |  9   ----------------------------<  100<H>  3.3<L>   |  37<H>  |  0.47<L>    Ca    8.9      10 Mauricio 2020 05:36  Phos  2.5     01-10  Mg     1.8     01-10    TPro  6.1  /  Alb  2.0<L>  /  TBili  0.2  /  DBili  <.10  /  AST  25  /  ALT  19  /  AlkPhos  81  01-09  Urinalysis Basic - ( 2020 13:41 )  Color: Yellow / Appearance: Slightly Turbid / S.015 / pH: x  Gluc: x / Ketone: Negative  / Bili: Negative / Urobili: Negative   Blood: x / Protein: 25 mg/dL / Nitrite: Negative   Leuk Esterase: Moderate / RBC: 6-10 /HPF / WBC 11-25   Sq Epi: x / Non Sq Epi: Few / Bacteria: Moderate    .Blood Blood-Peripheral   No growth to date. --  @ 00:16  Rapid RVP Result: NotDetec ( @ 10:25)      CENTRAL LINE: N         LUKE: N                     A-LINE: N                       GLOBAL ISSUE/BEST PRACTICE  Analgesia: n/a  Sedation: n/a  HOB elevation: yes  Stress ulcer prophylaxis: Protonix 40mg PO qd  VTE prophylaxis: Lovenox 40mg SubQ daily   Glycemic control: n/a  Nutrition: CLD      CODE STATUS: Full Code Patient is a 59y old  Female who presents with a chief complaint of pneumothorax, colitis, UTI (10 Mauricio 2020 07:38)    24 hour events: Overnight, the patient was anxious and was given tylenol and started on precedex gtt. This morning she was seen and examined c/o insomnia. She initially complained of chest pain this morning and increased work of breathing, CXR was ordered and then patient stated her chest pain resolved and sister did not want her to get CXR. When asked about chest tube recommendation from Dr. France, the patient did not recall being told about the chest tube.     REVIEW OF SYSTEMS  Constitutional: No fever, chills, fatigue  Neuro: c/o weakness, No headache, numbness   Resp: increased work of breathing   CVS: Initially complained of chest pain in the morning and then it resolved.   GI: complained of nausea   Skin: No itching, burning, rashes, or lesions   Msk: No joint pain or swelling  Psych: anxious   Heme: No bleeding    T(F): 98.4 (01-10-20 @ 07:29), Max: 99.8 (01-10-20 @ 00:16)  HR: 88 (01-10-20 @ 08:00) (79 - 121)  BP: 135/80 (01-10-20 @ 08:00) (78/49 - 188/86)  RR: 27 (01-10-20 @ 08:00) (24 - 67)  SpO2: 93% (01-10-20 @ 08:00) (86% - 100%)  Wt(kg): --    I&O's Summary     @ 07:01  -  01-10 @ 07:00  --------------------------------------------------------  IN: 2279 mL / OUT: 750 mL / NET: 1529 mL      PHYSICAL EXAM  General: Middle age female, appears older than stated age, frail, ill appearing, voice sounds hoarse today   CNS: awake, alert, oriented x3, sensation fully intact in all 4 extremities  HEENT: NC/AT, pupils equal and reactive to light b/l, MMM  Resp: diminished breath sounds b/l (Left>right), coarse crackles have improved  CVS: s1,s2, systolic murmur auscultated  Abd: soft, non-tender, BSx4   Ext: left upper extremity in brace as hand is pointed outward, no c/c/e  Skin: warm, dry     MEDICATIONS  piperacillin/tazobactam IVPB.. IV Intermittent  propranolol Oral  ALBUTerol    0.083% Nebulizer PRN  sodium chloride 3%  Inhalation Inhalation  tiotropium 18 MICROgram(s) Capsule Inhalation  acetaminophen   Tablet .. Oral PRN  dexMEDEtomidine Infusion IV Continuous  ondansetron    Tablet Oral PRN  OXcarbazepine Oral  enoxaparin Injectable SubCutaneous  pantoprazole    Tablet Oral  simethicone Chew PRN  sucralfate Oral  lactated ringers. IV Continuous  potassium phosphate / sodium phosphate powder Oral  influenza   Vaccine IntraMuscular  hydrocortisone 2.5% Rectal Cream Rectal  hydrocortisone hemorrhoidal Suppository Rectal  lidocaine 2% Gel Topical  witch hazel Pads Topical  zinc oxide 20% Ointment Topical  lactobacillus acidophilus Oral                          10.2   9.27  )-----------( 478      ( 10 Mauriico 2020 05:36 )             32.9       -10    138  |  95<L>  |  9   ----------------------------<  100<H>  3.3<L>   |  37<H>  |  0.47<L>    Ca    8.9      10 Mauricio 2020 05:36  Phos  2.5     -10  Mg     1.8     01-10    TPro  6.1  /  Alb  2.0<L>  /  TBili  0.2  /  DBili  <.10  /  AST  25  /  ALT  19  /  AlkPhos  81    Urinalysis Basic - ( 2020 13:41 )  Color: Yellow / Appearance: Slightly Turbid / S.015 / pH: x  Gluc: x / Ketone: Negative  / Bili: Negative / Urobili: Negative   Blood: x / Protein: 25 mg/dL / Nitrite: Negative   Leuk Esterase: Moderate / RBC: 6-10 /HPF / WBC 11-25   Sq Epi: x / Non Sq Epi: Few / Bacteria: Moderate    .Blood Blood-Peripheral   No growth to date. --  @ 00:16  Rapid RVP Result: NotDetec ( @ 10:25)      CENTRAL LINE: N         LUKE: N                     A-LINE: N                       GLOBAL ISSUE/BEST PRACTICE  Analgesia: n/a  Sedation: n/a  HOB elevation: yes  Stress ulcer prophylaxis: Protonix 40mg PO qd  VTE prophylaxis: Lovenox 40mg SubQ daily   Glycemic control: n/a  Nutrition: CLD      CODE STATUS: Full Code Patient is a 59y old  Female who presents with a chief complaint of pneumothorax, colitis, UTI (10 Mauricio 2020 07:38)    24 hour events: Overnight, the patient was anxious and was given tylenol and started on precedex gtt.   Last evening she initially agreed to CTA with premedication for IV contrast allergy, but then refused  also in evening agreed to chest tube but when seen by thoracic surgery this am refused tube  This morning she was seen and examined c/o insomnia. She initially complained of chest pain this morning and increased work of breathing, CXR was ordered and then patient stated her chest pain resolved and sister did not want her to get CXR.       REVIEW OF SYSTEMS  Constitutional: No fever, chills, fatigue  Neuro: c/o weakness, No headache, numbness   Resp: increased work of breathing   CVS: Initially complained of chest pain in the morning and then it resolved.   GI: complained of nausea   Skin: No itching, burning, rashes, or lesions   Msk: No joint pain or swelling  Psych: anxious   Heme: No bleeding    T(F): 98.4 (01-10-20 @ 07:29), Max: 99.8 (01-10-20 @ 00:16)  HR: 88 (01-10-20 @ 08:00) (79 - 121)  BP: 135/80 (01-10-20 @ 08:00) (78/49 - 188/86)  RR: 27 (01-10-20 @ 08:00) (24 - 67)  SpO2: 93% (01-10-20 @ 08:00) (86% - 100%)  Wt(kg): --    I&O's Summary     @ 07:  -  01-10 @ 07:00  --------------------------------------------------------  IN: 2279 mL / OUT: 750 mL / NET: 1529 mL      PHYSICAL EXAM  General: Middle age female, appears older than stated age, frail, ill appearing, voice sounds hoarse today   CNS: awake, alert, oriented x3, sensation fully intact in all 4 extremities  HEENT: NC/AT, pupils equal and reactive to light b/l, MMM  Resp: diminished breath sounds b/l (Left>right), coarse crackles have improved, b/l squeaks  CVS: s1,s2, systolic murmur auscultated  Abd: soft, non-tender, BSx4   Ext: left upper extremity in brace as hand is pointed outward, no c/c/e  Skin: warm, dry     MEDICATIONS  piperacillin/tazobactam IVPB.. IV Intermittent  propranolol Oral  ALBUTerol    0.083% Nebulizer PRN  sodium chloride 3%  Inhalation Inhalation  tiotropium 18 MICROgram(s) Capsule Inhalation  acetaminophen   Tablet .. Oral PRN  dexMEDEtomidine Infusion IV Continuous  ondansetron    Tablet Oral PRN  OXcarbazepine Oral  enoxaparin Injectable SubCutaneous  pantoprazole    Tablet Oral  simethicone Chew PRN  sucralfate Oral  lactated ringers. IV Continuous  potassium phosphate / sodium phosphate powder Oral  influenza   Vaccine IntraMuscular  hydrocortisone 2.5% Rectal Cream Rectal  hydrocortisone hemorrhoidal Suppository Rectal  lidocaine 2% Gel Topical  witch hazel Pads Topical  zinc oxide 20% Ointment Topical  lactobacillus acidophilus Oral                          10.2   9.27  )-----------( 478      ( 10 Mauricio 2020 05:36 )             32.9       01-10    138  |  95<L>  |  9   ----------------------------<  100<H>  3.3<L>   |  37<H>  |  0.47<L>    Ca    8.9      10 Mauricio 2020 05:36  Phos  2.5     01-10  Mg     1.8     -10    TPro  6.1  /  Alb  2.0<L>  /  TBili  0.2  /  DBili  <.10  /  AST  25  /  ALT  19  /  AlkPhos  81  01-09  Urinalysis Basic - ( 2020 13:41 )  Color: Yellow / Appearance: Slightly Turbid / S.015 / pH: x  Gluc: x / Ketone: Negative  / Bili: Negative / Urobili: Negative   Blood: x / Protein: 25 mg/dL / Nitrite: Negative   Leuk Esterase: Moderate / RBC: 6-10 /HPF / WBC 11-25   Sq Epi: x / Non Sq Epi: Few / Bacteria: Moderate    .Blood Blood-Peripheral   No growth to date. --  @ 00:16  Rapid RVP Result: NotDetec (01-08 @ 10:25)      CENTRAL LINE: N         LUKE: N                     A-LINE: N                       GLOBAL ISSUE/BEST PRACTICE  Analgesia: n/a  Sedation: n/a  HOB elevation: yes  Stress ulcer prophylaxis: Protonix 40mg PO qd  VTE prophylaxis: Lovenox 40mg SubQ daily   Glycemic control: n/a  Nutrition: CLD      CODE STATUS: Full Code

## 2020-01-10 NOTE — PROGRESS NOTE ADULT - PROBLEM SELECTOR PLAN 1
ct showing stercoral colitis c/b rectal pain in setting of hemorrhoids  now improved  monitoring off  bowel regimen   cont lido and hydrocortisone cream   cont anusol supp bid, witch hazel pads prn  monitor exam/gi fxn  up to date w colonoscopy, 4/2019 showed only non bleeding internal hemorrhoids; given persistence of symptoms, offered repeat c-scope but pt defers

## 2020-01-10 NOTE — PROGRESS NOTE ADULT - PROBLEM SELECTOR PLAN 2
moderate right pneumothorax, chronic advanced ILD, oxygen dependent  - following serial xray unchanged for several days but appears worsened vs new infiltrate/effusion on chest xray CT.    - episodes of tachypnea/dyspnea warranted discussion with CT surgery regarding placement of chest tube.  Pt family and Pt are deferring at this time for treatment however agree that this is a dynamic situation.    - d/w family that patients ILD is in advanced stage would and in the event that pt would need intubation would likely become ventilator dependent   - continue to encourage incentive spirometry use, oob as tolerated  - patient has h/o ILD not on steroids, follows with Dr. Hazel [facility].  - Cardiothoracic surgery, Dr. France following  - Pulmonology, Dr. Rashid following - continue Spiriva and kaiden

## 2020-01-10 NOTE — PROGRESS NOTE ADULT - SUBJECTIVE AND OBJECTIVE BOX
Subjective:  states she has labored breathing   saturating 99 percent on high flow oxygen     Vital Signs:  Vital Signs Last 24 Hrs  T(C): 36.9 (01-10-20 @ 07:29), Max: 37.7 (01-10-20 @ 00:16)  T(F): 98.4 (01-10-20 @ 07:29), Max: 99.8 (01-10-20 @ 00:16)  HR: 84 (01-10-20 @ 07:00) (79 - 121)  BP: 142/79 (01-10-20 @ 07:00) (78/49 - 188/86)  RR: 35 (01-10-20 @ 07:00) (24 - 67)  SpO2: 98% (01-10-20 @ 07:00) (86% - 100%) on (O2)    Telemetry/Alarms:    Relevant labs, radiology and Medications reviewed                        10.2   9.27  )-----------( 478      ( 10 Mauricio 2020 05:36 )             32.9     01-10    138  |  95<L>  |  9   ----------------------------<  100<H>  3.3<L>   |  37<H>  |  0.47<L>    Ca    8.9      10 Mauricio 2020 05:36  Phos  2.5     01-10  Mg     1.8     01-10    TPro  6.1  /  Alb  2.0<L>  /  TBili  0.2  /  DBili  <.10  /  AST  25  /  ALT  19  /  AlkPhos  81  01-09      MEDICATIONS  (STANDING):  dexMEDEtomidine Infusion 0.3 MICROgram(s)/kG/Hr (4.08 mL/Hr) IV Continuous <Continuous>  enoxaparin Injectable 40 milliGRAM(s) SubCutaneous daily  hydrocortisone 2.5% Rectal Cream 1 Application(s) Rectal two times a day  hydrocortisone hemorrhoidal Suppository 1 Suppository(s) Rectal two times a day  influenza   Vaccine 0.5 milliLiter(s) IntraMuscular once  lactated ringers. 1000 milliLiter(s) (80 mL/Hr) IV Continuous <Continuous>  lactobacillus acidophilus 1 Tablet(s) Oral three times a day with meals  lidocaine 2% Gel 1 Application(s) Topical three times a day  magnesium sulfate  IVPB 2 Gram(s) IV Intermittent once  OXcarbazepine 300 milliGRAM(s) Oral two times a day  pantoprazole    Tablet 40 milliGRAM(s) Oral before breakfast  piperacillin/tazobactam IVPB.. 3.375 Gram(s) IV Intermittent every 8 hours  potassium phosphate / sodium phosphate powder 1 Packet(s) Oral three times a day  propranolol 20 milliGRAM(s) Oral three times a day  sodium chloride 3%  Inhalation 3 milliLiter(s) Inhalation every 6 hours  sucralfate 1 Gram(s) Oral two times a day  tiotropium 18 MICROgram(s) Capsule 1 Capsule(s) Inhalation daily  witch hazel Pads 1 Application(s) Topical three times a day  zinc oxide 20% Ointment 1 Application(s) Topical two times a day    MEDICATIONS  (PRN):  acetaminophen   Tablet .. 650 milliGRAM(s) Oral every 6 hours PRN Temp greater or equal to 38C (100.4F), Mild Pain (1 - 3)  ALBUTerol    0.083% 2.5 milliGRAM(s) Nebulizer every 6 hours PRN Shortness of Breath and/or Wheezing  ondansetron    Tablet 4 milliGRAM(s) Oral every 12 hours PRN Nausea  simethicone 80 milliGRAM(s) Chew two times a day PRN Gas      Physical exam  Gen in mild distress   Neuro awake and appropriate   Card  sinus tach   Pulm  diminished breath sounds bilaterally; airway secretions   Abd  soft nd/nt   Ext  warm and well perfused         I&O's Summary    09 Jan 2020 07:01  -  10 Mauricio 2020 07:00  --------------------------------------------------------  IN: 2279 mL / OUT: 750 mL / NET: 1529 mL        Assessment  59y Female  w/ PAST MEDICAL & SURGICAL HISTORY:  Interstitial lung disease: on home o2 prn  NHL (non-Hodgkin's lymphoma): Agem 43 sp chemo/rt/stem cell  Transient cerebral ischemia, unspecified type  Mitral prolapse  History of tonsillectomy  History of appendectomy  admitted with complaints of Patient is a 59y old  Female who presents with a chief complaint of pneumothorax, colitis, UTI (10 Mauricio 2020 07:25)  .  On (Date), patient underwent . Postoperative course/issues:    PLAN    Patient seen and examined. She has history of ILD, oxygen dependent. Now what appears to be superimposed bilateral pneumonia.   Her Right pneumothorax is about the same as on her previous CT scan     Discussed with Patient all her options. Given that she is mild-moderate respiratory distress I had recommended a chest tube to take the right pneumothorax out of the equation for her respiratory distress.   I am not sure how much placing a tube would overall help her respiratory status, given that it is multifactorial.   Risks of placing a tube were discussed which included: bleeding and lung injury.     I think the safest way of placing a tube would be open tube thoracostomy or IR (CT guided). I do not think that there is enough of of a window for percutaneous pigtail placement.   Patient does not want a chest tube at this point. She stated she needs to do "more research on it." She understands that positive pressure ventilation if needed, may make the situation worse.   We called her family in the AM and gave them an update.     Dr. Cowan is on call for the weekend for Thoracic Surgery. Please call us if needed.

## 2020-01-10 NOTE — PROGRESS NOTE ADULT - SUBJECTIVE AND OBJECTIVE BOX
Neurology follow up note    ROMIE [id number]      Interval History:    Patient feels stable with SOB no headaches     MEDICATIONS    acetaminophen   Tablet .. 650 milliGRAM(s) Oral every 6 hours PRN  ALBUTerol    0.083% 2.5 milliGRAM(s) Nebulizer every 6 hours PRN  dexMEDEtomidine Infusion 0.3 MICROgram(s)/kG/Hr IV Continuous <Continuous>  enoxaparin Injectable 40 milliGRAM(s) SubCutaneous daily  hydrocortisone 2.5% Rectal Cream 1 Application(s) Rectal two times a day  hydrocortisone hemorrhoidal Suppository 1 Suppository(s) Rectal two times a day  influenza   Vaccine 0.5 milliLiter(s) IntraMuscular once  lactated ringers. 1000 milliLiter(s) IV Continuous <Continuous>  lactobacillus acidophilus 1 Tablet(s) Oral three times a day with meals  lidocaine 2% Gel 1 Application(s) Topical three times a day  ondansetron    Tablet 4 milliGRAM(s) Oral every 12 hours PRN  OXcarbazepine 300 milliGRAM(s) Oral two times a day  pantoprazole    Tablet 40 milliGRAM(s) Oral before breakfast  piperacillin/tazobactam IVPB.. 3.375 Gram(s) IV Intermittent every 8 hours  potassium phosphate / sodium phosphate powder 1 Packet(s) Oral three times a day  propranolol 20 milliGRAM(s) Oral three times a day  simethicone 80 milliGRAM(s) Chew two times a day PRN  sodium chloride 3%  Inhalation 3 milliLiter(s) Inhalation every 6 hours  sucralfate 1 Gram(s) Oral two times a day  tiotropium 18 MICROgram(s) Capsule 1 Capsule(s) Inhalation daily  witch hazel Pads 1 Application(s) Topical three times a day  zinc oxide 20% Ointment 1 Application(s) Topical two times a day      Allergies    IV Contrast (Anaphylaxis)  shellfish. (Anaphylaxis)    Intolerances            Vital Signs Last 24 Hrs  T(C): 36.9 (10 Mauricio 2020 07:29), Max: 37.7 (10 Mauricio 2020 00:16)  T(F): 98.4 (10 Mauricio 2020 07:29), Max: 99.8 (10 Mauricio 2020 00:16)  HR: 94 (10 Mauricio 2020 09:00) (79 - 121)  BP: 116/64 (10 Mauricio 2020 09:00) (78/49 - 188/86)  BP(mean): 83 (10 Mauricio 2020 09:00) (58 - 124)  RR: 19 (10 Mauricio 2020 09:00) (19 - 67)  SpO2: 94% (10 Mauricio 2020 09:00) (86% - 100%)      REVIEW OF SYSTEMS:  Constitutional:  The patient denies fever, chills, or night sweats.  Head:  Occ headaches.  Eyes:  No double vision or blurry vision.  Ears:  No ringing in the ears.  Neck:  No neck pain.  Respiratory:  Occasional cough with shortness of breath.  Cardiovascular:  Positive right-sided chest pain.  Abdomen:  occ nausea,  no vomiting, or abdominal pain.  Extremities/Neurological:  No numbness or tingling.  Musculoskeletal:  Occasional joint pain.    PHYSICAL EXAMINATION:   HEENT:  Head:  Normocephalic, atraumatic.  Eyes:  No scleral icterus.  Ears:  Hearing bilaterally appeared to be intact.  NECK:  Supple.  RESPIRATORY:  Decreased breath sounds bilaterally, but most prominent on the right.  CARDIOVASCULAR:  S1 and S2 heard.  ABDOMEN:  Soft and nontender.  Extremities:  No clubbing or cyanosis were noted.      NEUROLOGIC:  The patient is awake and alert.  Location was hospital, year was , month was December.  Was able to name objects.  Extraocular movements were intact.  Pupils were equal, round, and reactive bilaterally 3 mm to 2 mm.  Speech was fluent.  Smile was symmetric.  Motor:  Right upper was 5/5, left upper  arm brace was 3/5 decrease rom hand and finger in flexed position , right lower was 4/5, left lower was 3+/5.  As per my conversation with the spouse, after her apparent event back in March, questionable seizure versus questionable stroke.  She was left with left-sided weakness.  Sensory:  Bilateral upper and lower appeared intact to light touch.                LABS:  CBC Full  -  ( 10 Mauricio 2020 05:36 )  WBC Count : 9.27 K/uL  RBC Count : 3.67 M/uL  Hemoglobin : 10.2 g/dL  Hematocrit : 32.9 %  Platelet Count - Automated : 478 K/uL  Mean Cell Volume : 89.6 fl  Mean Cell Hemoglobin : 27.8 pg  Mean Cell Hemoglobin Concentration : 31.0 gm/dL  Auto Neutrophil # : 7.61 K/uL  Auto Lymphocyte # : 0.84 K/uL  Auto Monocyte # : 0.69 K/uL  Auto Eosinophil # : 0.06 K/uL  Auto Basophil # : 0.02 K/uL  Auto Neutrophil % : 82.2 %  Auto Lymphocyte % : 9.1 %  Auto Monocyte % : 7.4 %  Auto Eosinophil % : 0.6 %  Auto Basophil % : 0.2 %    Urinalysis Basic - ( 2020 13:41 )    Color: Yellow / Appearance: Slightly Turbid / S.015 / pH: x  Gluc: x / Ketone: Negative  / Bili: Negative / Urobili: Negative   Blood: x / Protein: 25 mg/dL / Nitrite: Negative   Leuk Esterase: Moderate / RBC: 6-10 /HPF / WBC 11-25   Sq Epi: x / Non Sq Epi: Few / Bacteria: Moderate      01-10    138  |  95<L>  |  9   ----------------------------<  100<H>  3.3<L>   |  37<H>  |  0.47<L>    Ca    8.9      10 Mauricio 2020 05:36  Phos  2.5     01-10  Mg     1.8     -10    TPro  6.1  /  Alb  2.0<L>  /  TBili  0.2  /  DBili  <.10  /  AST  25  /  ALT  19  /  AlkPhos  81  01-09    Hemoglobin A1C:     LIVER FUNCTIONS - ( 2020 05:38 )  Alb: 2.0 g/dL / Pro: 6.1 g/dL / ALK PHOS: 81 U/L / ALT: 19 U/L / AST: 25 U/L / GGT: x           Vitamin B12         RADIOLOGY    ANALYSIS AND PLAN:  This is a 59-year-old with a history of possibly epilepsy verse TIAs, history of chronic subdural hydromas and change in mental status.    1.	For episode of change in mental status, as per my conversation with the spouse, these appear to occur primarily at the same time at night for the last three to four weeks.  The patient has been on Trileptal for about eight to nine months.  Suspect less likely this is Trileptal, Questionable, the patient could have any type of sleep-related disorder causing these events to occur at night.  I spoke with the spouse, the patient should undergo sleep studies.  2.	For history of chronic subdural hematoma, these appeared to have resolved from previous MRI.  3.	For history of possible underlying epilepsy, for now, I will continue the patient on her Trileptal..  4.	Monitor CO2 levels as needed and respiratory status   5.	spoke outside neurologist Dr. Wallace in past agrees to continue trileptal for now  His telephone number is [phone].  6.	Spouse's name is Marialuisa, his telephone number is [phone]   7.	CT chest repeat - ? worsening of PTX -  pulmonary follow up antibiotics as needed   8.	no new events   9.	Greater than 25 minutes of time was spent with the patient, plan of care, reviewing data, speaking to the family and multidisciplinary healthcare team

## 2020-01-10 NOTE — PROGRESS NOTE ADULT - SUBJECTIVE AND OBJECTIVE BOX
Patient is a 59y old  Female who presents with a chief complaint of pneumothorax, colitis, UTI (10 Mauricio 2020 13:03)    HPI:    59F with PMH of interstitial lung disease hx of pneumothorax (on 2L NC at home), hx pulmonary nodules, Meniere's disease, Non-Hodgkin's lymphoma (SCD/XRT/chemo at [facility] ), hx of CVA (3/2019 - residual L sided weakness, unable to use her L arm), not on ASA or Plavix due to GIB, Seizure disorder, tachycardia, MVP, hx of chronic SDH, presented to ED  w/ complaints of nausea and diarrhea. Admitted to ICU for UTI and sterocolitis. Hospital course further complicated by Acute hypoxic respiratory failure, PTX, and severe sepsis secondary to superimposed PNA      Events in last 24 hours: Patient remains on high flow NC. Evaluated by Thoracic surgery who recc chest tube placement for PTX however patient refusing. At bedside still complains of some SOB however states 'feels about the same'    Allergies: IV Contrast  shellfish.    PAST MEDICAL & SURGICAL HISTORY:  Interstitial lung disease: on home o2 prn  NHL (non-Hodgkin's lymphoma): Agem 43 sp chemo/rt/stem cell  Transient cerebral ischemia, unspecified type  Mitral prolapse  History of tonsillectomy  History of appendectomy    FAMILY HISTORY:  Family history of stroke  Family history of breast cancer: Mother    SOCIAL HISTORY:    Home Medications:    Review of Systems:  Pertinent positives as noted above, all other ROS negative    T(F): 97.7 (01-10-20 @ 20:00), Max: 99.8 (01-10-20 @ 00:16)  HR: 86 (01-10-20 @ 20:05) (77 - 109)  BP: 132/69 (01-10-20 @ 19:00) (78/49 - 154/81)  RR: 34 (01-10-20 @ 19:00) (19 - 48)  SpO2: 93% (01-10-20 @ 20:05)  Wt(kg): --    CAPILLARY BLOOD GLUCOSE        I&O's Summary    2020 07:01  -  10 Mauricio 2020 07:00  --------------------------------------------------------  IN: 2279 mL / OUT: 750 mL / NET: 1529 mL    10 Mauricio 2020 07:01  -  10 Mauricio 2020 20:52  --------------------------------------------------------  IN: 1062 mL / OUT: 200 mL / NET: 862 mL        Physical Exam:     Gen: critically ill appearing, cachectic  Neuro: awake and alert, follows commands  CVS: Sinus tachycardia  Resp: Accessory muscle use, rhonchi bilaterally, decreased breath sounds right base  Abd: soft, NT< ND  Ext: cool, dry, no edema    Meds:  piperacillin/tazobactam IVPB.. 3.375 Gram(s) IV Intermittent every 8 hours    propranolol 20 milliGRAM(s) Oral three times a day        ALBUTerol    0.083% 2.5 milliGRAM(s) Nebulizer every 6 hours PRN  sodium chloride 3%  Inhalation 3 milliLiter(s) Inhalation every 6 hours  tiotropium 18 MICROgram(s) Capsule 1 Capsule(s) Inhalation daily     acetaminophen   Tablet .. 650 milliGRAM(s) Oral every 6 hours PRN  dexMEDEtomidine Infusion 0.3 MICROgram(s)/kG/Hr IV Continuous <Continuous>  ondansetron    Tablet 4 milliGRAM(s) Oral every 12 hours PRN  OXcarbazepine 300 milliGRAM(s) Oral two times a day        enoxaparin Injectable 40 milliGRAM(s) SubCutaneous daily     pantoprazole    Tablet 40 milliGRAM(s) Oral before breakfast  simethicone 80 milliGRAM(s) Chew two times a day PRN  sucralfate 1 Gram(s) Oral two times a day        lactated ringers. 1000 milliLiter(s) IV Continuous <Continuous>  lactated ringers. 1000 milliLiter(s) IV Continuous <Continuous>  potassium phosphate / sodium phosphate powder 1 Packet(s) Oral three times a day     influenza   Vaccine 0.5 milliLiter(s) IntraMuscular once     hydrocortisone 2.5% Rectal Cream 1 Application(s) Rectal two times a day  hydrocortisone hemorrhoidal Suppository 1 Suppository(s) Rectal two times a day  lidocaine 2% Gel 1 Application(s) Topical three times a day  witch hazel Pads 1 Application(s) Topical three times a day  zinc oxide 20% Ointment 1 Application(s) Topical two times a day     lactobacillus acidophilus 1 Tablet(s) Oral three times a day with meals                           10.2   9.27  )-----------( 478      ( 10 Mauricio 2020 05:36 )             32.9       01-10    138  |  95<L>  |  9   ----------------------------<  100<H>  3.3<L>   |  37<H>  |  0.47<L>    Ca    8.9      10 Mauricio 2020 05:36  Phos  2.5     01-10  Mg     1.8     01-10    TPro  6.1  /  Alb  2.0<L>  /  TBili  0.2  /  DBili  <.10  /  AST  25  /  ALT  19  /  AlkPhos  81              Urinalysis Basic - ( 2020 13:41 )    Color: Yellow / Appearance: Slightly Turbid / S.015 / pH: x  Gluc: x / Ketone: Negative  / Bili: Negative / Urobili: Negative   Blood: x / Protein: 25 mg/dL / Nitrite: Negative   Leuk Esterase: Moderate / RBC: 6-10 /HPF / WBC 11-25   Sq Epi: x / Non Sq Epi: Few / Bacteria: Moderate      .Blood Blood-Peripheral   No growth to date. --  @ 00:16      Rapid RVP Result: NotDetec ( @ 10:25)    ABG - ( 2020 10:10 )  pH, Arterial: 7.42  pH, Blood: x     /  pCO2: 56    /  pO2: 62    / HCO3: 33    / Base Excess: 10.2  /  SaO2: 91                Radiology:     EXAM:  US DPLX LWR EXT VEINS COMPL BI                            PROCEDURE DATE:  2020          INTERPRETATION:  CLINICAL INDICATION: Hypoxemia, pleuritic chest pain, assess DVT.    TECHNIQUE: Grayscale, color Doppler and spectral Doppler ultrasound was utilized to evaluate bilateral lower extremity deep venous system.      COMPARISON: 2019.    FINDINGS: There is no thrombosis in bilateral common femoral veins, femoral veins or popliteal veins. Visualized posterior tibial veins are patent.    IMPRESSION:     No deep vein thrombosis in either lower extremity.                HELDER EVERETT M.D., ATTENDING RADIOLOGIST  This document has been electronically signed. 2020 10:38PM      EXAM:  CT CHEST                            PROCEDURE DATE:  2020          INTERPRETATION:  CLINICAL INFORMATION: Tachypnea. No right pneumothorax.    COMPARISON: CT chest 2019  CT abdomen pelvis 2020.    PROCEDURE:   CT of the Chestwas performed without intravenous contrast.  Sagittal and coronal reformats were performed.      FINDINGS:    CHEST:     LUNGS AND LARGE AIRWAYS: Patent central airways. Stable bilateral subpleural reticular opacities. Worsening confluent groundglassopacities and bibasilar consolidations.  PLEURA: Stable moderate-sized right pneumothorax. Trace left pleural effusion.  VESSELS: Ectatic thoracic descending aorta measures up to 3.8 cm in diameter..  HEART: Heart size is normal. Small pericardial effusion.  MEDIASTINUM AND IRMA: No shift of the mediastinum. No lymphadenopathy.  CHEST WALL AND LOWER NECK: Within normal limits.  VISUALIZED UPPER ABDOMEN: Please refer to the report of the dedicated CT abdomen pelvis performed earlier today.  BONES:Mild degenerative changes of the spine.    IMPRESSION: Stable moderate-sized right pneumothorax.    Worsening confluent groundglass opacities and bibasilar consolidation superimposed on diffuse subpleural reticular opacities. Findings may represent acute pneumonia superimposed on top of underlying interstitial lung disease.                      BOGDAN HANSEN M.D., ATTENDING RADIOLOGIST  This document has been electronically signed. 2020  8:41PM    Assessment/Plan:        Critical care time spent (mins): *** Patient is a 59y old  Female who presents with a chief complaint of pneumothorax, colitis, UTI (10 Mauricio 2020 13:03)    HPI:    59F with PMH of interstitial lung disease hx of pneumothorax (on 2L NC at home), hx pulmonary nodules, Meniere's disease, Non-Hodgkin's lymphoma (SCD/XRT/chemo at [facility] ), hx of CVA (3/2019 - residual L sided weakness, unable to use her L arm), not on ASA or Plavix due to GIB, Seizure disorder, tachycardia, MVP, hx of chronic SDH, presented to ED  w/ complaints of nausea and diarrhea. Admitted to ICU for UTI and sterocolitis. Hospital course further complicated by Acute hypoxic respiratory failure, PTX, and severe sepsis secondary to superimposed PNA      Events in last 24 hours: Patient remains on high flow NC. Evaluated by Thoracic surgery who recc chest tube placement for PTX however patient refusing. At bedside still complains of some SOB however states 'feels about the same'    Allergies: IV Contrast  shellfish.    PAST MEDICAL & SURGICAL HISTORY:  Interstitial lung disease: on home o2 prn  NHL (non-Hodgkin's lymphoma): Agem 43 sp chemo/rt/stem cell  Transient cerebral ischemia, unspecified type  Mitral prolapse  History of tonsillectomy  History of appendectomy    FAMILY HISTORY:  Family history of stroke  Family history of breast cancer: Mother    SOCIAL HISTORY:    Home Medications:    Review of Systems:  Pertinent positives as noted above, all other ROS negative    T(F): 97.7 (01-10-20 @ 20:00), Max: 99.8 (01-10-20 @ 00:16)  HR: 86 (01-10-20 @ 20:05) (77 - 109)  BP: 132/69 (01-10-20 @ 19:00) (78/49 - 154/81)  RR: 34 (01-10-20 @ 19:00) (19 - 48)  SpO2: 93% (01-10-20 @ 20:05)  Wt(kg): --    CAPILLARY BLOOD GLUCOSE        I&O's Summary    2020 07:01  -  10 Mauricio 2020 07:00  --------------------------------------------------------  IN: 2279 mL / OUT: 750 mL / NET: 1529 mL    10 Mauricio 2020 07:01  -  10 Mauricio 2020 20:52  --------------------------------------------------------  IN: 1062 mL / OUT: 200 mL / NET: 862 mL        Physical Exam:     Gen: critically ill appearing, cachectic  Neuro: awake and alert, follows commands  CVS: Sinus tachycardia  Resp: Accessory muscle use, rhonchi bilaterally, decreased breath sounds right base  Abd: soft, NT< ND  Ext: cool, dry, no edema    Meds:  piperacillin/tazobactam IVPB.. 3.375 Gram(s) IV Intermittent every 8 hours    propranolol 20 milliGRAM(s) Oral three times a day        ALBUTerol    0.083% 2.5 milliGRAM(s) Nebulizer every 6 hours PRN  sodium chloride 3%  Inhalation 3 milliLiter(s) Inhalation every 6 hours  tiotropium 18 MICROgram(s) Capsule 1 Capsule(s) Inhalation daily     acetaminophen   Tablet .. 650 milliGRAM(s) Oral every 6 hours PRN  dexMEDEtomidine Infusion 0.3 MICROgram(s)/kG/Hr IV Continuous <Continuous>  ondansetron    Tablet 4 milliGRAM(s) Oral every 12 hours PRN  OXcarbazepine 300 milliGRAM(s) Oral two times a day        enoxaparin Injectable 40 milliGRAM(s) SubCutaneous daily     pantoprazole    Tablet 40 milliGRAM(s) Oral before breakfast  simethicone 80 milliGRAM(s) Chew two times a day PRN  sucralfate 1 Gram(s) Oral two times a day        lactated ringers. 1000 milliLiter(s) IV Continuous <Continuous>  lactated ringers. 1000 milliLiter(s) IV Continuous <Continuous>  potassium phosphate / sodium phosphate powder 1 Packet(s) Oral three times a day     influenza   Vaccine 0.5 milliLiter(s) IntraMuscular once     hydrocortisone 2.5% Rectal Cream 1 Application(s) Rectal two times a day  hydrocortisone hemorrhoidal Suppository 1 Suppository(s) Rectal two times a day  lidocaine 2% Gel 1 Application(s) Topical three times a day  witch hazel Pads 1 Application(s) Topical three times a day  zinc oxide 20% Ointment 1 Application(s) Topical two times a day     lactobacillus acidophilus 1 Tablet(s) Oral three times a day with meals                           10.2   9.27  )-----------( 478      ( 10 Mauricio 2020 05:36 )             32.9       01-10    138  |  95<L>  |  9   ----------------------------<  100<H>  3.3<L>   |  37<H>  |  0.47<L>    Ca    8.9      10 Mauricio 2020 05:36  Phos  2.5     01-10  Mg     1.8     01-10    TPro  6.1  /  Alb  2.0<L>  /  TBili  0.2  /  DBili  <.10  /  AST  25  /  ALT  19  /  AlkPhos  81              Urinalysis Basic - ( 2020 13:41 )    Color: Yellow / Appearance: Slightly Turbid / S.015 / pH: x  Gluc: x / Ketone: Negative  / Bili: Negative / Urobili: Negative   Blood: x / Protein: 25 mg/dL / Nitrite: Negative   Leuk Esterase: Moderate / RBC: 6-10 /HPF / WBC 11-25   Sq Epi: x / Non Sq Epi: Few / Bacteria: Moderate      .Blood Blood-Peripheral   No growth to date. --  @ 00:16      Rapid RVP Result: NotDetec ( @ 10:25)    ABG - ( 2020 10:10 )  pH, Arterial: 7.42  pH, Blood: x     /  pCO2: 56    /  pO2: 62    / HCO3: 33    / Base Excess: 10.2  /  SaO2: 91                Radiology:     EXAM:  US DPLX LWR EXT VEINS COMPL BI                            PROCEDURE DATE:  2020          INTERPRETATION:  CLINICAL INDICATION: Hypoxemia, pleuritic chest pain, assess DVT.    TECHNIQUE: Grayscale, color Doppler and spectral Doppler ultrasound was utilized to evaluate bilateral lower extremity deep venous system.      COMPARISON: 2019.    FINDINGS: There is no thrombosis in bilateral common femoral veins, femoral veins or popliteal veins. Visualized posterior tibial veins are patent.    IMPRESSION:     No deep vein thrombosis in either lower extremity.                HELDER EVERETT M.D., ATTENDING RADIOLOGIST  This document has been electronically signed. 2020 10:38PM      EXAM:  CT CHEST                            PROCEDURE DATE:  2020          INTERPRETATION:  CLINICAL INFORMATION: Tachypnea. No right pneumothorax.    COMPARISON: CT chest 2019  CT abdomen pelvis 2020.    PROCEDURE:   CT of the Chestwas performed without intravenous contrast.  Sagittal and coronal reformats were performed.      FINDINGS:    CHEST:     LUNGS AND LARGE AIRWAYS: Patent central airways. Stable bilateral subpleural reticular opacities. Worsening confluent groundglassopacities and bibasilar consolidations.  PLEURA: Stable moderate-sized right pneumothorax. Trace left pleural effusion.  VESSELS: Ectatic thoracic descending aorta measures up to 3.8 cm in diameter..  HEART: Heart size is normal. Small pericardial effusion.  MEDIASTINUM AND IRMA: No shift of the mediastinum. No lymphadenopathy.  CHEST WALL AND LOWER NECK: Within normal limits.  VISUALIZED UPPER ABDOMEN: Please refer to the report of the dedicated CT abdomen pelvis performed earlier today.  BONES:Mild degenerative changes of the spine.    IMPRESSION: Stable moderate-sized right pneumothorax.    Worsening confluent groundglass opacities and bibasilar consolidation superimposed on diffuse subpleural reticular opacities. Findings may represent acute pneumonia superimposed on top of underlying interstitial lung disease.                      BOGDAN HANSEN M.D., ATTENDING RADIOLOGIST  This document has been electronically signed. 2020  8:41PM    Assessment/Plan:  59F with PMH of interstitial lung disease hx of pneumothorax (on 2L NC at home), hx pulmonary nodules, Meniere's disease, [place]kin[place] [facility] [place]x of CVA (3/2019 - residual L sided weakness, unable to use her L arm), not on ASA or Plavix due to GIB, Seizure disorder, tachycardia, MVP, hx of chronic SDH, presented to ED  w/ complaints of nausea and diarrhea. Admitted to ICU for UTI and sterocolitis. Hospital course further complicated by Acute hypoxic respiratory failure, PTX, and severe sepsis secondary to superimposed PNA    -Hypoxic respiratory failure; currently High flow dependent. Actively titrating to maintain O2 sat >90%. Attempt to wean off, not candidate for NIV given PTX. High risk of intubation  -Hypoxia multifactorial PTX, Pneumonia, ILD. Unable to obtain CTA to rule out PE secondary to hx contrast allergy. LE dopplers negative  -Right PTX. Repeat CT appears stable however recc chest tube placement by CT surgery. Patient refusing. Keep Chest tube kit at bedside incase of emergent placement.  -Aspiration PNA coverage w/ Zosyn. Cultures remain NGTD  -Maintain LR @80cc/h as pt. can not tolerate full diet given extent of respiratory failure  -Utilize Precedex gtt for anxiolytic given associated increased WOB w/ anxiety.  -DVT PPX: Lovenox    Code Status; Full    Critical care time spent (mins): 34 minutes including time spent reviewing chart, ordering tests/labs, discussing with interdisciplinary team. Not including time spent performing procedures. Patient is a 59y old  Female who presents with a chief complaint of pneumothorax, colitis, UTI (10 Mauricio 2020 13:03)    HPI:    59F with PMH of interstitial lung disease hx of pneumothorax (on 2L NC at home), hx pulmonary nodules, Meniere's disease, Non-Hodgkin's lymphoma (SCD/XRT/chemo at [facility] ), hx of CVA (3/2019 - residual L sided weakness, unable to use her L arm), not on ASA or Plavix due to GIB, Seizure disorder, tachycardia, MVP, hx of chronic SDH, presented to ED  w/ complaints of nausea and diarrhea. Admitted to ICU for UTI and sterocolitis. Hospital course further complicated by Acute hypoxic respiratory failure, PTX, and severe sepsis secondary to superimposed PNA      Events in last 24 hours: Patient remains on high flow NC. Evaluated by Thoracic surgery who recc chest tube placement for PTX however patient refusing. At bedside still complains of some SOB however states 'feels about the same'    Allergies: IV Contrast  shellfish.    PAST MEDICAL & SURGICAL HISTORY:  Interstitial lung disease: on home o2 prn  NHL (non-Hodgkin's lymphoma): Agem 45 sp chemo/rt/stem cell  Transient cerebral ischemia, unspecified type  Mitral prolapse  History of tonsillectomy  History of appendectomy    FAMILY HISTORY:  Family history of stroke  Family history of breast cancer: Mother    SOCIAL HISTORY:    Home Medications:    Review of Systems:  Pertinent positives as noted above, all other ROS negative    T(F): 97.7 (01-10-20 @ 20:00), Max: 99.8 (01-10-20 @ 00:16)  HR: 86 (01-10-20 @ 20:05) (77 - 109)  BP: 132/69 (01-10-20 @ 19:00) (78/49 - 154/81)  RR: 34 (01-10-20 @ 19:00) (19 - 48)  SpO2: 93% (01-10-20 @ 20:05)  Wt(kg): --    CAPILLARY BLOOD GLUCOSE        I&O's Summary    2020 07:01  -  10 Mauricio 2020 07:00  --------------------------------------------------------  IN: 2279 mL / OUT: 750 mL / NET: 1529 mL    10 Mauricio 2020 07:01  -  10 Mauricio 2020 20:52  --------------------------------------------------------  IN: 1062 mL / OUT: 200 mL / NET: 862 mL        Physical Exam:     Gen: critically ill appearing, cachectic  Neuro: awake and alert, follows commands  CVS: Sinus tachycardia  Resp: Accessory muscle use, rhonchi bilaterally, decreased breath sounds right base  Abd: soft, NT< ND  Ext: cool, dry, no edema    Meds:  piperacillin/tazobactam IVPB.. 3.375 Gram(s) IV Intermittent every 8 hours    propranolol 20 milliGRAM(s) Oral three times a day        ALBUTerol    0.083% 2.5 milliGRAM(s) Nebulizer every 6 hours PRN  sodium chloride 3%  Inhalation 3 milliLiter(s) Inhalation every 6 hours  tiotropium 18 MICROgram(s) Capsule 1 Capsule(s) Inhalation daily     acetaminophen   Tablet .. 650 milliGRAM(s) Oral every 6 hours PRN  dexMEDEtomidine Infusion 0.3 MICROgram(s)/kG/Hr IV Continuous <Continuous>  ondansetron    Tablet 4 milliGRAM(s) Oral every 12 hours PRN  OXcarbazepine 300 milliGRAM(s) Oral two times a day        enoxaparin Injectable 40 milliGRAM(s) SubCutaneous daily     pantoprazole    Tablet 40 milliGRAM(s) Oral before breakfast  simethicone 80 milliGRAM(s) Chew two times a day PRN  sucralfate 1 Gram(s) Oral two times a day        lactated ringers. 1000 milliLiter(s) IV Continuous <Continuous>  lactated ringers. 1000 milliLiter(s) IV Continuous <Continuous>  potassium phosphate / sodium phosphate powder 1 Packet(s) Oral three times a day     influenza   Vaccine 0.5 milliLiter(s) IntraMuscular once     hydrocortisone 2.5% Rectal Cream 1 Application(s) Rectal two times a day  hydrocortisone hemorrhoidal Suppository 1 Suppository(s) Rectal two times a day  lidocaine 2% Gel 1 Application(s) Topical three times a day  witch hazel Pads 1 Application(s) Topical three times a day  zinc oxide 20% Ointment 1 Application(s) Topical two times a day     lactobacillus acidophilus 1 Tablet(s) Oral three times a day with meals                           10.2   9.27  )-----------( 478      ( 10 Mauricio 2020 05:36 )             32.9       01-10    138  |  95<L>  |  9   ----------------------------<  100<H>  3.3<L>   |  37<H>  |  0.47<L>    Ca    8.9      10 Mauricio 2020 05:36  Phos  2.5     01-10  Mg     1.8     01-10    TPro  6.1  /  Alb  2.0<L>  /  TBili  0.2  /  DBili  <.10  /  AST  25  /  ALT  19  /  AlkPhos  81              Urinalysis Basic - ( 2020 13:41 )    Color: Yellow / Appearance: Slightly Turbid / S.015 / pH: x  Gluc: x / Ketone: Negative  / Bili: Negative / Urobili: Negative   Blood: x / Protein: 25 mg/dL / Nitrite: Negative   Leuk Esterase: Moderate / RBC: 6-10 /HPF / WBC 11-25   Sq Epi: x / Non Sq Epi: Few / Bacteria: Moderate      .Blood Blood-Peripheral   No growth to date. --  @ 00:16      Rapid RVP Result: NotDetec ( @ 10:25)    ABG - ( 2020 10:10 )  pH, Arterial: 7.42  pH, Blood: x     /  pCO2: 56    /  pO2: 62    / HCO3: 33    / Base Excess: 10.2  /  SaO2: 91                Radiology:     EXAM:  US DPLX LWR EXT VEINS COMPL BI                            PROCEDURE DATE:  2020          INTERPRETATION:  CLINICAL INDICATION: Hypoxemia, pleuritic chest pain, assess DVT.    TECHNIQUE: Grayscale, color Doppler and spectral Doppler ultrasound was utilized to evaluate bilateral lower extremity deep venous system.      COMPARISON: 2019.    FINDINGS: There is no thrombosis in bilateral common femoral veins, femoral veins or popliteal veins. Visualized posterior tibial veins are patent.    IMPRESSION:     No deep vein thrombosis in either lower extremity.                HELDER EVERETT M.D., ATTENDING RADIOLOGIST  This document has been electronically signed. 2020 10:38PM      EXAM:  CT CHEST                            PROCEDURE DATE:  2020          INTERPRETATION:  CLINICAL INFORMATION: Tachypnea. No right pneumothorax.    COMPARISON: CT chest 2019  CT abdomen pelvis 2020.    PROCEDURE:   CT of the Chestwas performed without intravenous contrast.  Sagittal and coronal reformats were performed.      FINDINGS:    CHEST:     LUNGS AND LARGE AIRWAYS: Patent central airways. Stable bilateral subpleural reticular opacities. Worsening confluent groundglassopacities and bibasilar consolidations.  PLEURA: Stable moderate-sized right pneumothorax. Trace left pleural effusion.  VESSELS: Ectatic thoracic descending aorta measures up to 3.8 cm in diameter..  HEART: Heart size is normal. Small pericardial effusion.  MEDIASTINUM AND IRMA: No shift of the mediastinum. No lymphadenopathy.  CHEST WALL AND LOWER NECK: Within normal limits.  VISUALIZED UPPER ABDOMEN: Please refer to the report of the dedicated CT abdomen pelvis performed earlier today.  BONES:Mild degenerative changes of the spine.    IMPRESSION: Stable moderate-sized right pneumothorax.    Worsening confluent groundglass opacities and bibasilar consolidation superimposed on diffuse subpleural reticular opacities. Findings may represent acute pneumonia superimposed on top of underlying interstitial lung disease.                      BOGDAN HANSEN M.D., ATTENDING RADIOLOGIST  This document has been electronically signed. 2020  8:41PM    Assessment/Plan:  59F with PMH of interstitial lung disease hx of pneumothorax (on 2L NC at home), hx pulmonary nodules, Meniere's disease, Non-Hodgkin's lymphoma (SCD/XRT/chemo at [facility] [place] ), hx of CVA (3/2019 - residual L sided weakness, unable to use her L arm), not on ASA or Plavix due to GIB, Seizure disorder, tachycardia, MVP, hx of chronic SDH, presented to ED  w/ complaints of nausea and diarrhea. Admitted to ICU for UTI and sterocolitis. Hospital course further complicated by Acute hypoxic respiratory failure, PTX, and severe sepsis secondary to superimposed PNA    -Hypoxic respiratory failure; currently High flow dependent, increased FiO2 o 80%. Actively titrating to maintain O2 sat >90%. Attempt to wean off, not candidate for NIV given PTX. High risk of intubation  -Hypoxia multifactorial PTX, Pneumonia, ILD. Unable to obtain CTA to rule out PE secondary to hx contrast allergy. LE dopplers negative  -Right PTX. Repeat CT appears stable however recc chest tube placement by CT surgery. Patient refusing. Keep Chest tube kit at bedside incase of emergent placement.  -Aspiration PNA coverage w/ Zosyn. Cultures remain NGTD  -Maintain LR @80cc/h as pt. can not tolerate full diet given extent of respiratory failure  -Utilize Precedex gtt for anxiolytic given associated increased WOB w/ anxiety.  -DVT PPX: Lovenox    Code Status; Full    Critical care time spent (mins): 34 minutes including time spent reviewing chart, ordering tests/labs, discussing with interdisciplinary team. Not including time spent performing procedures.

## 2020-01-10 NOTE — PROGRESS NOTE ADULT - SUBJECTIVE AND OBJECTIVE BOX
INTERVAL HPI/OVERNIGHT EVENTS:  pt seen and examined  on hi flow  denies n/v/d/abd pain  tolerating po  seen by cts    MEDICATIONS  (STANDING):  dexMEDEtomidine Infusion 0.3 MICROgram(s)/kG/Hr (4.08 mL/Hr) IV Continuous <Continuous>  enoxaparin Injectable 40 milliGRAM(s) SubCutaneous daily  hydrocortisone 2.5% Rectal Cream 1 Application(s) Rectal two times a day  hydrocortisone hemorrhoidal Suppository 1 Suppository(s) Rectal two times a day  influenza   Vaccine 0.5 milliLiter(s) IntraMuscular once  lactated ringers. 1000 milliLiter(s) (80 mL/Hr) IV Continuous <Continuous>  lactobacillus acidophilus 1 Tablet(s) Oral three times a day with meals  lidocaine 2% Gel 1 Application(s) Topical three times a day  OXcarbazepine 300 milliGRAM(s) Oral two times a day  pantoprazole    Tablet 40 milliGRAM(s) Oral before breakfast  piperacillin/tazobactam IVPB.. 3.375 Gram(s) IV Intermittent every 8 hours  potassium phosphate / sodium phosphate powder 1 Packet(s) Oral three times a day  propranolol 20 milliGRAM(s) Oral three times a day  sodium chloride 3%  Inhalation 3 milliLiter(s) Inhalation every 6 hours  sucralfate 1 Gram(s) Oral two times a day  tiotropium 18 MICROgram(s) Capsule 1 Capsule(s) Inhalation daily  witch hazel Pads 1 Application(s) Topical three times a day  zinc oxide 20% Ointment 1 Application(s) Topical two times a day    MEDICATIONS  (PRN):  acetaminophen   Tablet .. 650 milliGRAM(s) Oral every 6 hours PRN Temp greater or equal to 38C (100.4F), Mild Pain (1 - 3)  ALBUTerol    0.083% 2.5 milliGRAM(s) Nebulizer every 6 hours PRN Shortness of Breath and/or Wheezing  ondansetron    Tablet 4 milliGRAM(s) Oral every 12 hours PRN Nausea  simethicone 80 milliGRAM(s) Chew two times a day PRN Gas      Allergies    IV Contrast (Anaphylaxis)  shellfish. (Anaphylaxis)    Intolerances        Review of Systems:    General:  No wt loss, fevers, chills, night sweats, fatigue   Eyes:  Good vision, no reported pain  ENT:  No sore throat, pain, runny nose, dysphagia  CV:  No pain, palpitations, hypo/hypertension  Resp:  No dyspnea, cough, tachypnea, wheezing  GI:  No pain, No nausea, No vomiting, No diarrhea, No constipation, No weight loss, No fever, No pruritis, No rectal bleeding, No melena, No dysphagia  :  No pain, bleeding, incontinence, nocturia  Muscle:  No pain, weakness  Neuro:  No weakness, tingling, memory problems  Psych:  No fatigue, insomnia, mood problems, depression  Endocrine:  No polyuria, polydypsia, cold/heat intolerance  Heme:  No petechiae, ecchymosis, easy bruisability  Skin:  No rash, tattoos, scars, edema      Vital Signs Last 24 Hrs  T(C): 36.9 (10 Mauricio 2020 07:29), Max: 37.7 (10 Mauricio 2020 00:16)  T(F): 98.4 (10 Mauricio 2020 07:29), Max: 99.8 (10 Mauricio 2020 00:16)  HR: 88 (10 Mauricio 2020 08:00) (79 - 121)  BP: 135/80 (10 Mauricio 2020 08:00) (78/49 - 188/86)  BP(mean): 103 (10 Mauricio 2020 08:00) (58 - 124)  RR: 27 (10 Mauricio 2020 08:00) (24 - 67)  SpO2: 93% (10 Mauricio 2020 08:00) (86% - 100%)    PHYSICAL EXAM:  Constitutional: lying in bed  HEENT: ncat  Neck: No LAD  Gastrointestinal: soft nt nd  Extremities: No peripheral edema  Neurological: Awake alert responds appropriately      LABS:                        10.2   9.27  )-----------( 478      ( 10 Mauricio 2020 05:36 )             32.9     01-10    138  |  95<L>  |  9   ----------------------------<  100<H>  3.3<L>   |  37<H>  |  0.47<L>    Ca    8.9      10 Mauricio 2020 05:36  Phos  2.5     01-10  Mg     1.8     01-10    TPro  6.1  /  Alb  2.0<L>  /  TBili  0.2  /  DBili  <.10  /  AST  25  /  ALT  19  /  AlkPhos  81  01-09      Urinalysis Basic - ( 2020 13:41 )    Color: Yellow / Appearance: Slightly Turbid / S.015 / pH: x  Gluc: x / Ketone: Negative  / Bili: Negative / Urobili: Negative   Blood: x / Protein: 25 mg/dL / Nitrite: Negative   Leuk Esterase: Moderate / RBC: 6-10 /HPF / WBC 11-25   Sq Epi: x / Non Sq Epi: Few / Bacteria: Moderate        RADIOLOGY & ADDITIONAL TESTS:

## 2020-01-10 NOTE — PROGRESS NOTE ADULT - SUBJECTIVE AND OBJECTIVE BOX
infectious diseases progress note:    ROMIE VIRAMONTES is a 59y y. o. Female patient    Patient reports: "not so great today but it is the morning"    ROS:    EYES:  Negative  blurry vision or double vision  GASTROINTESTINAL:  Negative for nausea, vomiting, diarrhea  -otherwise negative except for subjective    Allergies    IV Contrast (Anaphylaxis)  shellfish. (Anaphylaxis)    Intolerances        ANTIBIOTICS/RELEVANT:  antimicrobials  piperacillin/tazobactam IVPB.. 3.375 Gram(s) IV Intermittent every 8 hours    immunologic:  influenza   Vaccine 0.5 milliLiter(s) IntraMuscular once    OTHER:  acetaminophen   Tablet .. 650 milliGRAM(s) Oral every 6 hours PRN  ALBUTerol    0.083% 2.5 milliGRAM(s) Nebulizer every 6 hours PRN  dexMEDEtomidine Infusion 0.3 MICROgram(s)/kG/Hr IV Continuous <Continuous>  enoxaparin Injectable 40 milliGRAM(s) SubCutaneous daily  hydrocortisone 2.5% Rectal Cream 1 Application(s) Rectal two times a day  hydrocortisone hemorrhoidal Suppository 1 Suppository(s) Rectal two times a day  lactated ringers. 1000 milliLiter(s) IV Continuous <Continuous>  lactobacillus acidophilus 1 Tablet(s) Oral three times a day with meals  lidocaine 2% Gel 1 Application(s) Topical three times a day  ondansetron    Tablet 4 milliGRAM(s) Oral every 12 hours PRN  OXcarbazepine 300 milliGRAM(s) Oral two times a day  pantoprazole    Tablet 40 milliGRAM(s) Oral before breakfast  potassium phosphate / sodium phosphate powder 1 Packet(s) Oral three times a day  propranolol 20 milliGRAM(s) Oral three times a day  simethicone 80 milliGRAM(s) Chew two times a day PRN  sodium chloride 3%  Inhalation 3 milliLiter(s) Inhalation every 6 hours  sucralfate 1 Gram(s) Oral two times a day  tiotropium 18 MICROgram(s) Capsule 1 Capsule(s) Inhalation daily  witch hazel Pads 1 Application(s) Topical three times a day  zinc oxide 20% Ointment 1 Application(s) Topical two times a day      Objective:  Last 24-Vital Signs Last 24 Hrs  T(C): 36.9 (10 Mauricio 2020 07:29), Max: 37.7 (10 Mauricio 2020 00:16)  T(F): 98.4 (10 Mauricio 2020 07:29), Max: 99.8 (10 Mauricio 2020 00:16)  HR: 88 (10 Mauricio 2020 08:00) (79 - 121)  BP: 135/80 (10 Mauricio 2020 08:00) (78/49 - 188/86)  BP(mean): 103 (10 Mauricio 2020 08:00) (58 - 124)  RR: 27 (10 Mauricio 2020 08:00) (24 - 67)  SpO2: 93% (10 Mauricio 2020 08:00) (86% - 100%)    T(C): 36.9 (01-10-20 @ 07:29), Max: 39.1 (20 @ 20:46)  T(F): 98.4 (01-10-20 @ 07:29), Max: 102.3 (20 @ 20:46)  T(C): 36.9 (01-10-20 @ 07:29), Max: 39.4 (20 @ 08:33)  T(F): 98.4 (01-10-20 @ 07:29), Max: 103 (20 @ 08:33)  T(C): 36.9 (01-10-20 @ 07:29), Max: 39.4 (20 @ 08:33)  T(F): 98.4 (01-10-20 @ 07:29), Max: 103 (20 @ 08:33)    PHYSICAL EXAM:  Constitutional: Well-developed, well nourished  Eyes: PERRLA, EOMI  Ear/Nose/Throat: oropharynx normal	  Neck: no JVD, no lymphadenopathy, supple  Respiratory: no accessory muscle use, lung fields with left sided decreased crackles  Cardiovascular: RRR, normal S1, S2 no m/r/g  Gastrointestinal: soft, NT, no HSM, BS-normal  Extremities: no clubbing, no cyanosis, edema absent  Neuro: patient alert, oriented and appropriate  Skin: no sig lesions      LABS:                        10.2   9.27  )-----------( 478      ( 10 Mauricio 2020 05:36 )             32.9       WBC 9.27  01-10 @ 05:36  WBC 9.16   @ 05:38  WBC 11.07   @ 06:39  WBC 8.30   @ 08:28      01-10    138  |  95<L>  |  9   ----------------------------<  100<H>  3.3<L>   |  37<H>  |  0.47<L>    Ca    8.9      10 Mauricio 2020 05:36  Phos  2.5     01-10  Mg     1.8     01-10    TPro  6.1  /  Alb  2.0<L>  /  TBili  0.2  /  DBili  <.10  /  AST  25  /  ALT  19  /  AlkPhos  81        Creatinine, Serum: 0.47 mg/dL (01-10-20 @ 05:36)  Creatinine, Serum: 0.63 mg/dL (20 @ 05:38)  Creatinine, Serum: 0.49 mg/dL (20 @ 06:39)  Creatinine, Serum: 0.55 mg/dL (20 @ 08:01)  Creatinine, Serum: 0.43 mg/dL (20 @ 08:28)        Urinalysis Basic - ( 2020 13:41 )    Color: Yellow / Appearance: Slightly Turbid / S.015 / pH: x  Gluc: x / Ketone: Negative  / Bili: Negative / Urobili: Negative   Blood: x / Protein: 25 mg/dL / Nitrite: Negative   Leuk Esterase: Moderate / RBC: 6-10 /HPF / WBC 11-25   Sq Epi: x / Non Sq Epi: Few / Bacteria: Moderate            MICROBIOLOGY:        RADIOLOGY & ADDITIONAL STUDIES:

## 2020-01-10 NOTE — CHART NOTE - NSCHARTNOTEFT_GEN_A_CORE
Patient received CTA head and neck with 95 cc Omnipaque on 3/17/19 at [facility]. CT report seen in [facility] as well as H&P from that admission (H&P with CTA results printed out and placed in paper chart). Notes in [facility] did not reveal if patient received pre-medication but no adverse events from contrast administration documented in H&P or other notes available in [facility].     Radha Calles MD   EM/IM/CC PGY6 regarding IV contrast allergy:  Outside records obtained.  Patient received CTA head and neck with 95 cc Omnipaque on 3/17/19 at [facility]. CT report seen in [facility] as well as H&P from that admission (H&P with CTA results printed out and placed in paper chart). Notes in [facility] did not reveal if patient received pre-medication but no adverse events from contrast administration documented in H&P or other notes available in [facility].     Radha Calles MD   EM/IM/CC PGY6

## 2020-01-10 NOTE — PROGRESS NOTE ADULT - PROBLEM SELECTOR PLAN 1
r ptx - chr likely - however - may be getting worse - in the context of Chr Lung disease - ILD -   poss PNA - ID eval noted  on emp ABX and NEBS and Inhaler regimen  pt is on High Flow NC - VS and HD and Sat reviewed  I and O  ICU monitoring  discussed situation with CCM and Thoracic colleagues - may need to consider CT placement with IR - precision may be important in this case  will follow  cont monitoring and care and assist

## 2020-01-11 LAB
ANION GAP SERPL CALC-SCNC: 7 MMOL/L — SIGNIFICANT CHANGE UP (ref 5–17)
ANION GAP SERPL CALC-SCNC: 7 MMOL/L — SIGNIFICANT CHANGE UP (ref 5–17)
BUN SERPL-MCNC: 7 MG/DL — SIGNIFICANT CHANGE UP (ref 7–23)
BUN SERPL-MCNC: 8 MG/DL — SIGNIFICANT CHANGE UP (ref 7–23)
CALCIUM SERPL-MCNC: 8.1 MG/DL — LOW (ref 8.5–10.1)
CALCIUM SERPL-MCNC: 8.4 MG/DL — LOW (ref 8.5–10.1)
CHLORIDE SERPL-SCNC: 93 MMOL/L — LOW (ref 96–108)
CHLORIDE SERPL-SCNC: 96 MMOL/L — SIGNIFICANT CHANGE UP (ref 96–108)
CO2 SERPL-SCNC: 36 MMOL/L — HIGH (ref 22–31)
CO2 SERPL-SCNC: 36 MMOL/L — HIGH (ref 22–31)
CREAT SERPL-MCNC: 0.35 MG/DL — LOW (ref 0.5–1.3)
CREAT SERPL-MCNC: 0.54 MG/DL — SIGNIFICANT CHANGE UP (ref 0.5–1.3)
GLUCOSE SERPL-MCNC: 114 MG/DL — HIGH (ref 70–99)
GLUCOSE SERPL-MCNC: 163 MG/DL — HIGH (ref 70–99)
HCT VFR BLD CALC: 29 % — LOW (ref 34.5–45)
HGB BLD-MCNC: 9.3 G/DL — LOW (ref 11.5–15.5)
MAGNESIUM SERPL-MCNC: 2.2 MG/DL — SIGNIFICANT CHANGE UP (ref 1.6–2.6)
MCHC RBC-ENTMCNC: 28.1 PG — SIGNIFICANT CHANGE UP (ref 27–34)
MCHC RBC-ENTMCNC: 32.1 GM/DL — SIGNIFICANT CHANGE UP (ref 32–36)
MCV RBC AUTO: 87.6 FL — SIGNIFICANT CHANGE UP (ref 80–100)
MRSA PCR RESULT.: SIGNIFICANT CHANGE UP
NRBC # BLD: 0 /100 WBCS — SIGNIFICANT CHANGE UP (ref 0–0)
PHOSPHATE SERPL-MCNC: 2.9 MG/DL — SIGNIFICANT CHANGE UP (ref 2.5–4.5)
PLATELET # BLD AUTO: 449 K/UL — HIGH (ref 150–400)
POTASSIUM SERPL-MCNC: 3 MMOL/L — LOW (ref 3.5–5.3)
POTASSIUM SERPL-MCNC: 3.5 MMOL/L — SIGNIFICANT CHANGE UP (ref 3.5–5.3)
POTASSIUM SERPL-SCNC: 3 MMOL/L — LOW (ref 3.5–5.3)
POTASSIUM SERPL-SCNC: 3.5 MMOL/L — SIGNIFICANT CHANGE UP (ref 3.5–5.3)
RBC # BLD: 3.31 M/UL — LOW (ref 3.8–5.2)
RBC # FLD: 14.2 % — SIGNIFICANT CHANGE UP (ref 10.3–14.5)
S AUREUS DNA NOSE QL NAA+PROBE: DETECTED
SODIUM SERPL-SCNC: 136 MMOL/L — SIGNIFICANT CHANGE UP (ref 135–145)
SODIUM SERPL-SCNC: 139 MMOL/L — SIGNIFICANT CHANGE UP (ref 135–145)
WBC # BLD: 8.62 K/UL — SIGNIFICANT CHANGE UP (ref 3.8–10.5)
WBC # FLD AUTO: 8.62 K/UL — SIGNIFICANT CHANGE UP (ref 3.8–10.5)

## 2020-01-11 PROCEDURE — 99291 CRITICAL CARE FIRST HOUR: CPT

## 2020-01-11 PROCEDURE — 71045 X-RAY EXAM CHEST 1 VIEW: CPT | Mod: 26

## 2020-01-11 PROCEDURE — 99232 SBSQ HOSP IP/OBS MODERATE 35: CPT

## 2020-01-11 RX ORDER — IBUPROFEN 200 MG
600 TABLET ORAL ONCE
Refills: 0 | Status: DISCONTINUED | OUTPATIENT
Start: 2020-01-11 | End: 2020-01-11

## 2020-01-11 RX ORDER — ALPRAZOLAM 0.25 MG
0.25 TABLET ORAL EVERY 8 HOURS
Refills: 0 | Status: DISCONTINUED | OUTPATIENT
Start: 2020-01-11 | End: 2020-01-12

## 2020-01-11 RX ORDER — POTASSIUM CHLORIDE 20 MEQ
40 PACKET (EA) ORAL ONCE
Refills: 0 | Status: COMPLETED | OUTPATIENT
Start: 2020-01-11 | End: 2020-01-11

## 2020-01-11 RX ORDER — MESALAMINE 400 MG
1000 TABLET, DELAYED RELEASE (ENTERIC COATED) ORAL AT BEDTIME
Refills: 0 | Status: DISCONTINUED | OUTPATIENT
Start: 2020-01-11 | End: 2020-01-24

## 2020-01-11 RX ORDER — MORPHINE SULFATE 50 MG/1
2 CAPSULE, EXTENDED RELEASE ORAL ONCE
Refills: 0 | Status: DISCONTINUED | OUTPATIENT
Start: 2020-01-11 | End: 2020-01-11

## 2020-01-11 RX ORDER — POTASSIUM CHLORIDE 20 MEQ
10 PACKET (EA) ORAL
Refills: 0 | Status: COMPLETED | OUTPATIENT
Start: 2020-01-11 | End: 2020-01-11

## 2020-01-11 RX ORDER — ALPRAZOLAM 0.25 MG
0.25 TABLET ORAL ONCE
Refills: 0 | Status: DISCONTINUED | OUTPATIENT
Start: 2020-01-11 | End: 2020-01-11

## 2020-01-11 RX ORDER — MORPHINE SULFATE 50 MG/1
1 CAPSULE, EXTENDED RELEASE ORAL ONCE
Refills: 0 | Status: DISCONTINUED | OUTPATIENT
Start: 2020-01-11 | End: 2020-01-16

## 2020-01-11 RX ADMIN — MORPHINE SULFATE 2 MILLIGRAM(S): 50 CAPSULE, EXTENDED RELEASE ORAL at 19:30

## 2020-01-11 RX ADMIN — DEXMEDETOMIDINE HYDROCHLORIDE IN 0.9% SODIUM CHLORIDE 4.08 MICROGRAM(S)/KG/HR: 4 INJECTION INTRAVENOUS at 04:00

## 2020-01-11 RX ADMIN — SODIUM CHLORIDE 80 MILLILITER(S): 9 INJECTION, SOLUTION INTRAVENOUS at 05:49

## 2020-01-11 RX ADMIN — AER TRAVELER 1 APPLICATION(S): 0.5 SOLUTION RECTAL; TOPICAL at 05:52

## 2020-01-11 RX ADMIN — Medication 1 SUPPOSITORY(S): at 17:22

## 2020-01-11 RX ADMIN — PIPERACILLIN AND TAZOBACTAM 25 GRAM(S): 4; .5 INJECTION, POWDER, LYOPHILIZED, FOR SOLUTION INTRAVENOUS at 21:15

## 2020-01-11 RX ADMIN — Medication 1 APPLICATION(S): at 09:49

## 2020-01-11 RX ADMIN — Medication 100 MILLIEQUIVALENT(S): at 19:54

## 2020-01-11 RX ADMIN — DEXMEDETOMIDINE HYDROCHLORIDE IN 0.9% SODIUM CHLORIDE 4.08 MICROGRAM(S)/KG/HR: 4 INJECTION INTRAVENOUS at 17:32

## 2020-01-11 RX ADMIN — PIPERACILLIN AND TAZOBACTAM 25 GRAM(S): 4; .5 INJECTION, POWDER, LYOPHILIZED, FOR SOLUTION INTRAVENOUS at 05:50

## 2020-01-11 RX ADMIN — ONDANSETRON 4 MILLIGRAM(S): 8 TABLET, FILM COATED ORAL at 16:33

## 2020-01-11 RX ADMIN — TIOTROPIUM BROMIDE 1 CAPSULE(S): 18 CAPSULE ORAL; RESPIRATORY (INHALATION) at 13:42

## 2020-01-11 RX ADMIN — DEXMEDETOMIDINE HYDROCHLORIDE IN 0.9% SODIUM CHLORIDE 4.08 MICROGRAM(S)/KG/HR: 4 INJECTION INTRAVENOUS at 00:39

## 2020-01-11 RX ADMIN — Medication 100 MILLIEQUIVALENT(S): at 07:32

## 2020-01-11 RX ADMIN — LIDOCAINE 1 APPLICATION(S): 4 CREAM TOPICAL at 05:51

## 2020-01-11 RX ADMIN — OXCARBAZEPINE 300 MILLIGRAM(S): 300 TABLET, FILM COATED ORAL at 08:40

## 2020-01-11 RX ADMIN — DEXMEDETOMIDINE HYDROCHLORIDE IN 0.9% SODIUM CHLORIDE 4.08 MICROGRAM(S)/KG/HR: 4 INJECTION INTRAVENOUS at 07:31

## 2020-01-11 RX ADMIN — Medication 1000 MILLIGRAM(S): at 21:26

## 2020-01-11 RX ADMIN — Medication 0.25 MILLIGRAM(S): at 18:17

## 2020-01-11 RX ADMIN — AER TRAVELER 1 APPLICATION(S): 0.5 SOLUTION RECTAL; TOPICAL at 13:43

## 2020-01-11 RX ADMIN — Medication 650 MILLIGRAM(S): at 22:30

## 2020-01-11 RX ADMIN — Medication 60 MILLIGRAM(S): at 19:55

## 2020-01-11 RX ADMIN — LIDOCAINE 1 APPLICATION(S): 4 CREAM TOPICAL at 21:14

## 2020-01-11 RX ADMIN — Medication 1 SUPPOSITORY(S): at 05:55

## 2020-01-11 RX ADMIN — Medication 100 MILLIEQUIVALENT(S): at 16:13

## 2020-01-11 RX ADMIN — Medication 100 MILLIEQUIVALENT(S): at 18:17

## 2020-01-11 RX ADMIN — Medication 0.25 MILLIGRAM(S): at 22:24

## 2020-01-11 RX ADMIN — Medication 1 GRAM(S): at 17:22

## 2020-01-11 RX ADMIN — OXCARBAZEPINE 300 MILLIGRAM(S): 300 TABLET, FILM COATED ORAL at 21:15

## 2020-01-11 RX ADMIN — LIDOCAINE 1 APPLICATION(S): 4 CREAM TOPICAL at 13:43

## 2020-01-11 RX ADMIN — MORPHINE SULFATE 2 MILLIGRAM(S): 50 CAPSULE, EXTENDED RELEASE ORAL at 19:07

## 2020-01-11 RX ADMIN — SODIUM CHLORIDE 3 MILLILITER(S): 9 INJECTION INTRAMUSCULAR; INTRAVENOUS; SUBCUTANEOUS at 08:59

## 2020-01-11 RX ADMIN — DEXMEDETOMIDINE HYDROCHLORIDE IN 0.9% SODIUM CHLORIDE 4.08 MICROGRAM(S)/KG/HR: 4 INJECTION INTRAVENOUS at 10:27

## 2020-01-11 RX ADMIN — ENOXAPARIN SODIUM 40 MILLIGRAM(S): 100 INJECTION SUBCUTANEOUS at 12:21

## 2020-01-11 RX ADMIN — Medication 100 MILLIEQUIVALENT(S): at 08:40

## 2020-01-11 RX ADMIN — ZINC OXIDE 1 APPLICATION(S): 200 OINTMENT TOPICAL at 17:22

## 2020-01-11 RX ADMIN — Medication 1 TABLET(S): at 08:40

## 2020-01-11 RX ADMIN — Medication 1 TABLET(S): at 17:22

## 2020-01-11 RX ADMIN — Medication 1 APPLICATION(S): at 21:17

## 2020-01-11 RX ADMIN — Medication 650 MILLIGRAM(S): at 21:29

## 2020-01-11 RX ADMIN — Medication 40 MILLIEQUIVALENT(S): at 09:17

## 2020-01-11 RX ADMIN — AER TRAVELER 1 APPLICATION(S): 0.5 SOLUTION RECTAL; TOPICAL at 21:26

## 2020-01-11 RX ADMIN — ZINC OXIDE 1 APPLICATION(S): 200 OINTMENT TOPICAL at 05:53

## 2020-01-11 RX ADMIN — PIPERACILLIN AND TAZOBACTAM 25 GRAM(S): 4; .5 INJECTION, POWDER, LYOPHILIZED, FOR SOLUTION INTRAVENOUS at 13:43

## 2020-01-11 NOTE — PROGRESS NOTE ADULT - SUBJECTIVE AND OBJECTIVE BOX
24 hour events:   overnight had anxiety and hypoxemia  precedex started for anxiety  required increasing high flow to FiO2 to 80% and 40L    T(F): 98.9 (20 @ 03:37), Max: 99.1 (01-10-20 @ 23:20)  HR: 82 (20 @ 07:49) (75 - 102)  BP: 115/59 (20 @ 07:05) (86/52 - 157/103)  RR: 31 (20 @ 07:49) (19 - 57)  SpO2: 98% (20 @ 07:49) (79% - 100%)  Wt(kg): --      01-10-20 @ 07:01  -  20 @ 07:00  --------------------------------------------------------  IN: 2372.2 mL / OUT: 700 mL / NET: 1672.2 mL        CAPILLARY BLOOD GLUCOSE          I&O's Summary    10 Mauricio 2020 07:01  -  2020 07:00  --------------------------------------------------------  IN: 2372.2 mL / OUT: 700 mL / NET: 1672.2 mL        Physical Exam:   Gen:  Neuro:  HEENT:  Resp:  CVS:  Abd:  Ext:  Skin:    Meds:  piperacillin/tazobactam IVPB.. IV Intermittent    propranolol Oral      ALBUTerol    0.083% Nebulizer PRN  sodium chloride 3%  Inhalation Inhalation  tiotropium 18 MICROgram(s) Capsule Inhalation    acetaminophen   Tablet .. Oral PRN  dexMEDEtomidine Infusion IV Continuous  ondansetron    Tablet Oral PRN  OXcarbazepine Oral      enoxaparin Injectable SubCutaneous    pantoprazole    Tablet Oral  simethicone Chew PRN  sucralfate Oral      lactated ringers. IV Continuous  lactated ringers. IV Continuous  potassium chloride  10 mEq/100 mL IVPB IV Intermittent  potassium phosphate / sodium phosphate powder Oral    influenza   Vaccine IntraMuscular    hydrocortisone 2.5% Rectal Cream Rectal  hydrocortisone hemorrhoidal Suppository Rectal  lidocaine 2% Gel Topical  witch hazel Pads Topical  zinc oxide 20% Ointment Topical    lactobacillus acidophilus Oral                            9.3    8.62  )-----------( 449      ( 2020 05:54 )             29.0       01-11    139  |  96  |  7   ----------------------------<  114<H>  3.0<L>   |  36<H>  |  0.35<L>    Ca    8.1<L>      2020 05:54  Phos  2.9       Mg     2.2                   Urinalysis Basic - ( 2020 13:41 )    Color: Yellow / Appearance: Slightly Turbid / S.015 / pH: x  Gluc: x / Ketone: Negative  / Bili: Negative / Urobili: Negative   Blood: x / Protein: 25 mg/dL / Nitrite: Negative   Leuk Esterase: Moderate / RBC: 6-10 /HPF / WBC 11-25   Sq Epi: x / Non Sq Epi: Few / Bacteria: Moderate      .Blood Blood-Peripheral   No growth to date. --  @ 00:16      Rapid RVP Result: NotDetec ( @ 10:25)          Radiology: ***  Bedside ultrasound: ***    CENTRAL LINE: N/Y          DATE INSERTED:              REMOVE: Y/N  LUKE: N/Y                       DATE INSERTED:              REMOVE: Y/N  A-LINE: N/Y                       DATE INSERTED:              REMOVE: Y/N    GLOBAL ISSUE/BEST PRACTICE:  Analgesia:  Sedation:  CAM-ICU:   HOB elevation: yes  Stress ulcer prophylaxis:  VTE prophylaxis:  Glycemic control:  Nutrition:    CODE STATUS: *** 24 hour events:   overnight had anxiety and hypoxemia  precedex started for anxiety  required increasing high flow to FiO2 to 80% and 40L    T(F): 98.9 (20 @ 03:37), Max: 99.1 (01-10-20 @ 23:20)  HR: 82 (20 @ 07:49) (75 - 102)  BP: 115/59 (20 @ 07:05) (86/52 - 157/103)  RR: 31 (20 @ 07:49) (19 - 57)  SpO2: 98% (20 @ 07:49) (79% - 100%)  Wt(kg): --      01-10-20 @ 07:01  -  20 @ 07:00  --------------------------------------------------------  IN: 2372.2 mL / OUT: 700 mL / NET: 1672.2 mL        CAPILLARY BLOOD GLUCOSE          I&O's Summary    10 Mauricio 2020 07:01  -  2020 07:00  --------------------------------------------------------  IN: 2372.2 mL / OUT: 700 mL / NET: 1672.2 mL        Physical Exam:   Gen: frail and chronically ill appearing  Neuro: alert  Resp: b/l coarse crackles, reduced on L  CVS: RRR  Abd: soft, NTND  Ext: no edema  Skin: WWP    Meds:  piperacillin/tazobactam IVPB.. IV Intermittent    propranolol Oral      ALBUTerol    0.083% Nebulizer PRN  sodium chloride 3%  Inhalation Inhalation  tiotropium 18 MICROgram(s) Capsule Inhalation    acetaminophen   Tablet .. Oral PRN  dexMEDEtomidine Infusion IV Continuous  ondansetron    Tablet Oral PRN  OXcarbazepine Oral      enoxaparin Injectable SubCutaneous    pantoprazole    Tablet Oral  simethicone Chew PRN  sucralfate Oral      lactated ringers. IV Continuous  lactated ringers. IV Continuous  potassium chloride  10 mEq/100 mL IVPB IV Intermittent  potassium phosphate / sodium phosphate powder Oral    influenza   Vaccine IntraMuscular    hydrocortisone 2.5% Rectal Cream Rectal  hydrocortisone hemorrhoidal Suppository Rectal  lidocaine 2% Gel Topical  witch hazel Pads Topical  zinc oxide 20% Ointment Topical    lactobacillus acidophilus Oral                            9.3    8.62  )-----------( 449      ( 2020 05:54 )             29.0       -11    139  |  96  |  7   ----------------------------<  114<H>  3.0<L>   |  36<H>  |  0.35<L>    Ca    8.1<L>      2020 05:54  Phos  2.9       Mg     2.2                   Urinalysis Basic - ( 2020 13:41 )    Color: Yellow / Appearance: Slightly Turbid / S.015 / pH: x  Gluc: x / Ketone: Negative  / Bili: Negative / Urobili: Negative   Blood: x / Protein: 25 mg/dL / Nitrite: Negative   Leuk Esterase: Moderate / RBC: 6-10 /HPF / WBC 11-25   Sq Epi: x / Non Sq Epi: Few / Bacteria: Moderate      .Blood Blood-Peripheral   No growth to date. --  @ 00:16      Rapid RVP Result: NotDetec ( @ 10:25)          Radiology:   < from: TTE Echo Doppler w/o Cont (01.10.20 @ 18:36) >  LVEF: 65%  RVSP: 48mmHg    FINDINGS  Left Ventricle: Normal left ventricular systolic function.  Aortic Valve:Thickened trileaflet aortic valve. No significant aortic insufficiency.  Mitral Valve: Thickened mitral valve with mild posterior leaflet prolapse. Moderate mitral insufficiency  Tricuspid Valve: Normal tricuspid valve. Mild to moderate tricuspid insufficiency.  Pulmonic Valve: Not well visualized. No significant pulmonic insufficiency.  Left Atrium: Mildly enlarged  Right Ventricle: Normal right ventricular size and systolic function.  Right Atrium: Normal  Diastolic Function: Grade 1 diastolic dysfunction  Pericardium/Pleura: Normal pericardium with no pericardial effusion.      CONCLUSIONS:    1. Normal left ventricular systolic function. The ejection fraction is approximately 65%.  2. Thickened trileaflet aortic valve with no significant aortic insufficiency.  3. Thickened mitral valve with mild posterior leaflet prolapse. Moderate mitral insufficiency.  4. Mild left atrial enlargement.  5. Normal right ventricular size and systolic function.  6. Grade 1 diastolic dysfunction.  7. Right ventricular systolic pressure equals 48 mmHg, assuming a right atrial pressure of 8 mmHg, consistent with mild pulmonary hypertension.    < end of copied text >      CENTRAL LINE: N  LUKE: N  A-LINE: N    GLOBAL ISSUE/BEST PRACTICE:  Analgesia: N    Sedation:Y  HOB elevation: yes  Stress ulcer prophylaxis: Y  VTE prophylaxis: Y  Glycemic control: Y  Nutrition: Y    CODE STATUS: FULL

## 2020-01-11 NOTE — PROGRESS NOTE ADULT - SUBJECTIVE AND OBJECTIVE BOX
INTERVAL HPI/OVERNIGHT EVENTS:  overnight events noted  pt seen and examined  on high flow  admits to rectal pain  tolerating po but with minimal appetite  denies n/v/d/abd pain  GOC discussions ongoing      MEDICATIONS  (STANDING):  dexMEDEtomidine Infusion 0.3 MICROgram(s)/kG/Hr (4.08 mL/Hr) IV Continuous <Continuous>  enoxaparin Injectable 40 milliGRAM(s) SubCutaneous daily  hydrocortisone 2.5% Rectal Cream 1 Application(s) Rectal two times a day  hydrocortisone hemorrhoidal Suppository 1 Suppository(s) Rectal two times a day  influenza   Vaccine 0.5 milliLiter(s) IntraMuscular once  lactated ringers. 1000 milliLiter(s) (80 mL/Hr) IV Continuous <Continuous>  lactobacillus acidophilus 1 Tablet(s) Oral three times a day with meals  lidocaine 2% Gel 1 Application(s) Topical three times a day  OXcarbazepine 300 milliGRAM(s) Oral two times a day  pantoprazole    Tablet 40 milliGRAM(s) Oral before breakfast  piperacillin/tazobactam IVPB.. 3.375 Gram(s) IV Intermittent every 8 hours  potassium phosphate / sodium phosphate powder 1 Packet(s) Oral three times a day  propranolol 20 milliGRAM(s) Oral three times a day  sodium chloride 3%  Inhalation 3 milliLiter(s) Inhalation every 6 hours  sucralfate 1 Gram(s) Oral two times a day  tiotropium 18 MICROgram(s) Capsule 1 Capsule(s) Inhalation daily  witch hazel Pads 1 Application(s) Topical three times a day  zinc oxide 20% Ointment 1 Application(s) Topical two times a day    MEDICATIONS  (PRN):  acetaminophen   Tablet .. 650 milliGRAM(s) Oral every 6 hours PRN Temp greater or equal to 38C (100.4F), Mild Pain (1 - 3)  ALBUTerol    0.083% 2.5 milliGRAM(s) Nebulizer every 6 hours PRN Shortness of Breath and/or Wheezing  ondansetron    Tablet 4 milliGRAM(s) Oral every 12 hours PRN Nausea  simethicone 80 milliGRAM(s) Chew two times a day PRN Gas      Allergies    IV Contrast (Anaphylaxis)  shellfish. (Anaphylaxis)    Intolerances        Review of Systems:    General:  No wt loss, fevers, chills, night sweats, fatigue   Eyes:  Good vision, no reported pain  ENT:  No sore throat, pain, runny nose, dysphagia  CV:  No pain, palpitations, hypo/hypertension  Resp:  No dyspnea, cough, tachypnea, wheezing  GI:  No pain, No nausea, No vomiting, No diarrhea, No constipation, No weight loss, No fever, No pruritis, +rectal pain, No rectal bleeding, No melena, No dysphagia  :  No pain, bleeding, incontinence, nocturia  Muscle:  No pain, weakness  Neuro:  No weakness, tingling, memory problems  Psych:  No fatigue, insomnia, mood problems, depression  Endocrine:  No polyuria, polydypsia, cold/heat intolerance  Heme:  No petechiae, ecchymosis, easy bruisability  Skin:  No rash, tattoos, scars, edema      Vital Signs Last 24 Hrs  T(C): 36.9 (10 Mauricio 2020 07:29), Max: 37.7 (10 Mauricio 2020 00:16)  T(F): 98.4 (10 Mauricio 2020 07:29), Max: 99.8 (10 Mauricio 2020 00:16)  HR: 88 (10 Mauricio 2020 08:00) (79 - 121)  BP: 135/80 (10 Mauricio 2020 08:00) (78/49 - 188/86)  BP(mean): 103 (10 Mauricio 2020 08:00) (58 - 124)  RR: 27 (10 Mauricio 2020 08:00) (24 - 67)  SpO2: 93% (10 Mauricio 2020 08:00) (86% - 100%)    PHYSICAL EXAM:  Constitutional: lying in bed  HEENT: ncat, on high flow  Neck: No LAD  Gastrointestinal: soft nt nd  Extremities: No peripheral edema  Neurological: Awake alert responds appropriately      LABS:                        10.2   9.27  )-----------( 478      ( 10 Mauricio 2020 05:36 )             32.9     01-10    138  |  95<L>  |  9   ----------------------------<  100<H>  3.3<L>   |  37<H>  |  0.47<L>    Ca    8.9      10 Mauricio 2020 05:36  Phos  2.5     01-10  Mg     1.8     01-10    TPro  6.1  /  Alb  2.0<L>  /  TBili  0.2  /  DBili  <.10  /  AST  25  /  ALT  19  /  AlkPhos  81  01-09      Urinalysis Basic - ( 2020 13:41 )    Color: Yellow / Appearance: Slightly Turbid / S.015 / pH: x  Gluc: x / Ketone: Negative  / Bili: Negative / Urobili: Negative   Blood: x / Protein: 25 mg/dL / Nitrite: Negative   Leuk Esterase: Moderate / RBC: 6-10 /HPF / WBC 11-25   Sq Epi: x / Non Sq Epi: Few / Bacteria: Moderate        RADIOLOGY & ADDITIONAL TESTS:

## 2020-01-11 NOTE — PROGRESS NOTE ADULT - SUBJECTIVE AND OBJECTIVE BOX
Neurology follow up note    ROMIE [id number]      Interval History:    Patient feels stable with SOB no headaches seen with spouse     MEDICATIONS    acetaminophen   Tablet .. 650 milliGRAM(s) Oral every 6 hours PRN  ALBUTerol    0.083% 2.5 milliGRAM(s) Nebulizer every 6 hours PRN  dexMEDEtomidine Infusion 0.3 MICROgram(s)/kG/Hr IV Continuous <Continuous>  enoxaparin Injectable 40 milliGRAM(s) SubCutaneous daily  hydrocortisone 2.5% Rectal Cream 1 Application(s) Rectal two times a day  hydrocortisone hemorrhoidal Suppository 1 Suppository(s) Rectal two times a day  influenza   Vaccine 0.5 milliLiter(s) IntraMuscular once  lactated ringers. 1000 milliLiter(s) IV Continuous <Continuous>  lactated ringers. 1000 milliLiter(s) IV Continuous <Continuous>  lactobacillus acidophilus 1 Tablet(s) Oral three times a day with meals  lidocaine 2% Gel 1 Application(s) Topical three times a day  ondansetron    Tablet 4 milliGRAM(s) Oral every 12 hours PRN  OXcarbazepine 300 milliGRAM(s) Oral two times a day  pantoprazole    Tablet 40 milliGRAM(s) Oral before breakfast  piperacillin/tazobactam IVPB.. 3.375 Gram(s) IV Intermittent every 8 hours  potassium phosphate / sodium phosphate powder 1 Packet(s) Oral three times a day  propranolol 20 milliGRAM(s) Oral three times a day  simethicone 80 milliGRAM(s) Chew two times a day PRN  sodium chloride 3%  Inhalation 3 milliLiter(s) Inhalation every 6 hours  sucralfate 1 Gram(s) Oral two times a day  tiotropium 18 MICROgram(s) Capsule 1 Capsule(s) Inhalation daily  witch hazel Pads 1 Application(s) Topical three times a day  zinc oxide 20% Ointment 1 Application(s) Topical two times a day      Allergies    IV Contrast (Anaphylaxis)  shellfish. (Anaphylaxis)    Intolerances            Vital Signs Last 24 Hrs  T(C): 36.7 (2020 08:00), Max: 37.3 (10 Mauricio 2020 23:20)  T(F): 98 (2020 08:00), Max: 99.1 (10 Mauricio 2020 23:20)  HR: 82 (2020 10:23) (69 - 99)  BP: 93/56 (2020 10:23) (84/48 - 157/103)  BP(mean): 83 (2020 10:23) (60 - 119)  RR: 26 (2020 10:23) (24 - 57)  SpO2: 100% (2020 10:23) (84% - 100%)      headaches.  Eyes:  No double vision or blurry vision.  Ears:  No ringing in the ears.  Neck:  No neck pain.  Respiratory:  Occasional cough with shortness of breath.  Cardiovascular:  Positive right-sided chest pain.  Abdomen:  occ nausea,  no vomiting, or abdominal pain.  Extremities/Neurological:  No numbness or tingling.  Musculoskeletal:  Occasional joint pain.    PHYSICAL EXAMINATION:   HEENT:  Head:  Normocephalic, atraumatic.  Eyes:  No scleral icterus.  Ears:  Hearing bilaterally appeared to be intact.  NECK:  Supple.  RESPIRATORY:  Decreased breath sounds bilaterally, but most prominent on the right.  CARDIOVASCULAR:  S1 and S2 heard.  ABDOMEN:  Soft and nontender.  Extremities:  No clubbing or cyanosis were noted.      NEUROLOGIC:  The patient is awake and alert.  Location was hospital, year was , month was December.  Was able to name objects.  Extraocular movements were intact.  Pupils were equal, round, and reactive bilaterally 3 mm to 2 mm.  Speech was fluent.  Smile was symmetric.  Motor:  Right upper was 5/5, left upper  arm brace was 3/5 decrease rom hand and finger in flexed position , right lower was 4/5, left lower was 3+/5.  As per my conversation with the spouse, after her apparent event back in March, questionable seizure versus questionable stroke.  She was left with left-sided weakness.  Sensory:  Bilateral upper and lower appeared intact to light touch.            LABS:  CBC Full  -  ( 2020 05:54 )  WBC Count : 8.62 K/uL  RBC Count : 3.31 M/uL  Hemoglobin : 9.3 g/dL  Hematocrit : 29.0 %  Platelet Count - Automated : 449 K/uL  Mean Cell Volume : 87.6 fl  Mean Cell Hemoglobin : 28.1 pg  Mean Cell Hemoglobin Concentration : 32.1 gm/dL  Auto Neutrophil # : x  Auto Lymphocyte # : x  Auto Monocyte # : x  Auto Eosinophil # : x  Auto Basophil # : x  Auto Neutrophil % : x  Auto Lymphocyte % : x  Auto Monocyte % : x  Auto Eosinophil % : x  Auto Basophil % : x    Urinalysis Basic - ( 2020 13:41 )    Color: Yellow / Appearance: Slightly Turbid / S.015 / pH: x  Gluc: x / Ketone: Negative  / Bili: Negative / Urobili: Negative   Blood: x / Protein: 25 mg/dL / Nitrite: Negative   Leuk Esterase: Moderate / RBC: 6-10 /HPF / WBC 11-25   Sq Epi: x / Non Sq Epi: Few / Bacteria: Moderate          139  |  96  |  7   ----------------------------<  114<H>  3.0<L>   |  36<H>  |  0.35<L>    Ca    8.1<L>      2020 05:54  Phos  2.9       Mg     2.2           Hemoglobin A1C:       Vitamin B12         RADIOLOGY    ANALYSIS AND PLAN:  This is a 59-year-old with a history of possibly epilepsy verse TIAs, history of chronic subdural hydromas and change in mental status.    1.	For episode of change in mental status, as per my conversation with the spouse, these appear to occur primarily at the same time at night for the last three to four weeks.  The patient has been on Trileptal for about eight to nine months.  Suspect less likely this is Trileptal, Questionable, the patient could have any type of sleep-related disorder causing these events to occur at night.  I spoke with the spouse, the patient should undergo sleep studies.  2.	For history of chronic subdural hematoma, these appeared to have resolved from previous MRI.  3.	For history of possible underlying epilepsy, for now, I will continue the patient on her Trileptal..  4.	Monitor CO2 levels as needed and respiratory status   5.	spoke outside neurologist Dr. Wallace in past agrees to continue trileptal for now  His telephone number is [phone].  6.	Spouse's name is Marialuisa, his telephone number is [phone] spoke to him at bedside   7.	CT chest repeat - ? worsening of PTX -  pulmonary follow up antibiotics as needed   8.	no new events   9.	Greater than 25 minutes of time was spent with the patient, plan of care, reviewing data, speaking to the family and multidisciplinary healthcare team

## 2020-01-11 NOTE — PROGRESS NOTE ADULT - ATTENDING COMMENTS
59F with Hx ILD (unknown etiology, no Bx), chronic hypoxemic respiratory failure, known chronic R PTX which has been conservatively managed, Hx NHL (s/p XRT, chemo and SCT), Hx suspected CVA with R sided weakness, Hx seizure, chronic SDH v hygromas admitted 12/26 with colitis/proctitis and treated with CTX and flagyl.  Hospital course complicated by development of HCAP, acute on chronic hypoxemic respiratory failure requiring high flow NC.  Overall unchanged today.    --continue trileptal for sz disorder  repeat trileptal level pending  anxiety, continue precedex, trial of xanax prn  --hemodynamically stable  continue propranolol (home med for elevated HR per pt)  --hypoxemic respiratory failure suspect from HCAP   remains on high flow NC, wean as tolerates  suspect main contributor to hypoxemia is HCAP  continue duonebs, mucomyst, hypertonic for secretions clearance  DDx also includes PE, however pt refusing CTA at this time  --ILD, recommend trial of steroids but pt currently refusing  --chronic R PTX, pt refused CT when recommended by thoracic surgery 1/10  serial CXR to eval for worsening of PTX  --tolerating PO diet though poor intake  --normal renal function  --HCAP, continue zosyn pending Cx data  --plan discussed in detail with pt, and   --pt critically ill.  CC time 60min

## 2020-01-11 NOTE — PROGRESS NOTE ADULT - ASSESSMENT
59F with PMH of interstitial lung disease hx of pneumothorax (on 2L NC at home), hx pulmonary nodules, Meniere's disease, Non-Hodgkin's lymphoma (SCD/XRT/chemo at [facility] 2003), hx of CVA (3/2019 - residual L sided weakness, unable to use her L arm), not on ASA or Plavix due to GIB, Seizure disorder, tachycardia, MVP, hx of chronic SDH, presented to ED 12/26 w/ complaints of nausea and diarrhea. Admitted to ICU for UTI and sterocolitis. Hospital course further complicated by Acute hypoxic respiratory failure, PTX, and severe sepsis secondary to superimposed PNA    1. Acute hypoxic Respiratory Failure  2. Right sided Ptx  3. Severe Sepsis due to PNA    Plan:   - Patient continues to require high flow nasal cannula due to hypoxic respiratory failure. Patient was titrated to 35L/40% this AM in attempt to wean but grew hypoxic to 80% with worsening dyspnea. Increased support to 35L/60% with some improvement. Patient with persistent PTx but does not desire chest tube placement at present time. Goals of care conversation ongoing by ICU team. Will repeat CXR in the AM. Patient is a full code at present and should patient become unstable would require intubation and chest tube placement. In the interim will continue to offer noninvasive support  -BP decreased this afternoon. Improved with holding of precedex. Will continue to hold precedex  at present and monitor volume status. Patient may require gentle fluid bolus but would prefer to keep patient net neutral and or negative to facilitate oxygenation.   -Diet as tolerated. Aspiration precautions  -GI Following for colitis. Recommendations noted  -Strict I&Os. encourage spirometry. DVT proph in place  -OOB as able and tolerated  -Goals of care conversations ongoing. Palliative care suggested    Critical Care time spent exclusive of that time spent on bundled and /or procedural care - 39 minutes. 59F with PMH of interstitial lung disease hx of pneumothorax (on 2L NC at home), hx pulmonary nodules, Meniere's disease, Non-Hodgkin's lymphoma (SCD/XRT/chemo at [facility] 2003), hx of CVA (3/2019 - residual L sided weakness, unable to use her L arm), not on ASA or Plavix due to GIB, Seizure disorder, tachycardia, MVP, hx of chronic SDH, presented to ED 12/26 w/ complaints of nausea and diarrhea. Admitted to ICU for UTI and sterocolitis. Hospital course further complicated by Acute hypoxic respiratory failure, PTX, and severe sepsis secondary to superimposed PNA    1. Acute hypoxic Respiratory Failure  2. Right sided Ptx  3. Severe Sepsis due to PNA    Plan:   - Patient continues to require high flow nasal cannula due to hypoxic respiratory failure. Patient was titrated to 35L/40% this AM in attempt to wean but grew hypoxic to 80% with worsening dyspnea. Increased support to 35L/60% with some improvement. Patient with persistent PTx but does not desire chest tube placement at present time. Goals of care conversation ongoing by ICU team. Will repeat CXR in the AM. Patient is a full code at present and should patient become unstable would require intubation and chest tube placement. In the interim will continue to offer noninvasive support. Continue abx (zosyn) for PNA tx.   -BP decreased this afternoon. Improved with holding of precedex. Will continue to hold precedex  at present and monitor volume status. Patient may require gentle fluid bolus but would prefer to keep patient net neutral and or negative to facilitate oxygenation.   -Diet as tolerated. Aspiration precautions  -GI Following for colitis. Recommendations noted  -Strict I&Os. encourage spirometry. DVT proph in place  -OOB as able and tolerated  -Goals of care conversations ongoing. Palliative care suggested    Critical Care time spent exclusive of that time spent on bundled and /or procedural care - 39 minutes.

## 2020-01-11 NOTE — PROGRESS NOTE ADULT - SUBJECTIVE AND OBJECTIVE BOX
HISTORY  HPI:  59F with PMH of interstitial lung disease hx of pneumothorax (on 2L NC at home), hx pulmonary nodules, Meniere's disease, Non-Hodgkin's lymphoma (SCD/XRT/chemo at [facility] 2003), hx of CVA (3/2019 - residual L sided weakness, unable to use her L arm), not on ASA or Plavix due to GIB, Seizure disorder, tachycardia, MVP, hx of chronic SDH, presents with weakness, weight loss, nausea, and diarrhea for the past month. Patient admits to 4 lb weight loss in 3 weeks, was seen by her PCP and started on Zofran one week ago. Patient reports going to wound care prior to admission for bed sores, and was advised ED evaluation. Per  at bedside, patient has been having loose BMs several times a day with foul smelling urine for 4 days. Denies fever. Patient also has felt too weak to get out of bed and started using a diaper to urinate/have BMs. Has mainly been wheelchair bound recently due to weakness, but at baseline patient is able to stand and walk short distances. Patient uses 2L NC at home and noticed her SpO2 at 75% on RA with ambulation. Patient was also switched to Oxcarbazepine 2-3 months ago and noticed her symptoms of anxiety, weakness, and "memory" have worsened.  Of note, at night when patient takes her Oxcarbazepine, she starts yelling and having "hallucinations."     In the ED: temp 97.6, HR 97, /80, RR 15, SpO2 92% RA, 98% on 3L NC. WBC 11.10. UA: moderate LEC, WBC 26-50, moderate bacteria. Chest x-ray: Interval enlargement of a right-sided pneumothorax. Small bilateral pleural effusions. Chronic lung fibrotic changes. CT chest/abd/pelvis: Moderate-sized right pneumothorax. Worsening groundglass opacities in both lower lobes, possibly infection. Stercoral colitis. Indeterminant hepatic lesions, possibly metastatic disease. Received IV Rocephin, IV Flagyl, 1L NS Bolus. EKG: NSR 88      24 HOUR EVENTS:  Overnight patient continued with hypoxia resulting in escalating fio2 requirement via high flow nasal cannula. Precedex started for increased agitation overnight. Patient hypotensive this afternonn requiring. Patient still refusing chest tube placement. Still with increased dyspnea otherwise no acute complaints.     SUBJECTIVE/ROS:  [x ] A ten-point review of systems was otherwise negative except as noted.  [ ] Due to altered mental status/intubation, subjective information were not able to be obtained from the patient. History was obtained, to the extent possible, from review of the chart and collateral sources of information.      NEURO  RASS:  0   GCS:  15   CAM ICU: neg  Exam: Alert and following commands. Makes needs known. No focal deficits.   Meds: acetaminophen   Tablet .. 650 milliGRAM(s) Oral every 6 hours PRN Temp greater or equal to 38C (100.4F), Mild Pain (1 - 3)  dexMEDEtomidine Infusion 0.3 MICROgram(s)/kG/Hr IV Continuous <Continuous>  ondansetron    Tablet 4 milliGRAM(s) Oral every 12 hours PRN Nausea  OXcarbazepine 300 milliGRAM(s) Oral two times a day    [x] Adequacy of sedation and pain control has been assessed and adjusted      RESPIRATORY  RR: 29 (01-11-20 @ 11:15) (24 - 57)  SpO2: 93% (01-11-20 @ 11:15) (84% - 100%)  Wt(kg): --  Exam: mild dyspnea, high flow in place. decreased bs on right.   Mechanical Ventilation:     [ ] Extubation Readiness Assessed  Meds: ALBUTerol    0.083% 2.5 milliGRAM(s) Nebulizer every 6 hours PRN Shortness of Breath and/or Wheezing  sodium chloride 3%  Inhalation 3 milliLiter(s) Inhalation every 6 hours  tiotropium 18 MICROgram(s) Capsule 1 Capsule(s) Inhalation daily        CARDIOVASCULAR  HR: 75 (01-11-20 @ 11:15) (69 - 99)  BP: 95/54 (01-11-20 @ 11:15) (80/50 - 157/103)  BP(mean): 68 (01-11-20 @ 11:15) (60 - 119)  ABP: --  ABP(mean): --  Wt(kg): --  CVP(cm H2O): --      Exam:  Cardiac Rhythm: NSR S1S2  Perfusion     [x ]Adequate   [ ]Inadequate  Mentation   [x ]Normal       [ ]Reduced  Extremities  [x ]Warm         [ ]Cool  Volume Status [ ]Hypervolemic [ x]Euvolemic [ ]Hypovolemic  Meds: propranolol 20 milliGRAM(s) Oral three times a day        GI/NUTRITION  Exam:  Diet:  Meds: pantoprazole    Tablet 40 milliGRAM(s) Oral before breakfast  simethicone 80 milliGRAM(s) Chew two times a day PRN Gas  sucralfate 1 Gram(s) Oral two times a day      GENITOURINARY  I&O's Detail    01-10 @ 07:01 - 01-11 @ 07:00  --------------------------------------------------------  IN:    dexmedetomidine Infusion: 92.2 mL    lactated ringers.: 1200 mL    Oral Fluid: 780 mL    Solution: 50 mL    Solution: 250 mL  Total IN: 2372.2 mL    OUT:    Voided: 700 mL  Total OUT: 700 mL    Total NET: 1672.2 mL      01-11 @ 07:01 - 01-11 @ 13:12  --------------------------------------------------------  IN:    dexmedetomidine Infusion: 12.2 mL    lactated ringers.: 160 mL    Oral Fluid: 120 mL    Solution: 200 mL  Total IN: 492.2 mL    OUT:  Total OUT: 0 mL    Total NET: 492.2 mL          01-11    139  |  96  |  7   ----------------------------<  114<H>  3.0<L>   |  36<H>  |  0.35<L>    Ca    8.1<L>      11 Jan 2020 05:54  Phos  2.9     01-11  Mg     2.2     01-11      [ ] Villatoro catheter, indication: N/A  Meds: lactated ringers. 1000 milliLiter(s) IV Continuous <Continuous>  lactated ringers. 1000 milliLiter(s) IV Continuous <Continuous>  potassium phosphate / sodium phosphate powder 1 Packet(s) Oral three times a day        HEMATOLOGIC  Meds: enoxaparin Injectable 40 milliGRAM(s) SubCutaneous daily    [x] VTE Prophylaxis                        9.3    8.62  )-----------( 449      ( 11 Jan 2020 05:54 )             29.0       Transfusion     [ ] PRBC   [ ] Platelets   [ ] FFP   [ ] Cryoprecipitate      INFECTIOUS DISEASES  T(C): 36.7 (01-11-20 @ 08:00), Max: 37.3 (01-10-20 @ 23:20)  Wt(kg): --  WBC Count: 8.62 K/uL (01-11 @ 05:54)    Recent Cultures:  Specimen Source: .Blood Blood-Peripheral, 01-08 @ 00:16; Results   No growth to date.; Gram Stain: --; Organism: --    Meds: influenza   Vaccine 0.5 milliLiter(s) IntraMuscular once  piperacillin/tazobactam IVPB.. 3.375 Gram(s) IV Intermittent every 8 hours        ENDOCRINE  Capillary Blood Glucose    Meds:       ACCESS DEVICES:  [ ] Peripheral IV  [ ] Central Venous Line	[ ] R	[ ] L	[ ] IJ	[ ] Fem	[ ] SC	Placed:   [ ] Arterial Line		[ ] R	[ ] L	[ ] Fem	[ ] Rad	[ ] Ax	Placed:   [ ] PICC:					[ ] Mediport  [ ] Urinary Catheter, Date Placed:   [ ] Necessity of urinary, arterial, and venous catheters discussed    OTHER MEDICATIONS:  hydrocortisone 2.5% Rectal Cream 1 Application(s) Rectal two times a day  hydrocortisone hemorrhoidal Suppository 1 Suppository(s) Rectal two times a day  lactobacillus acidophilus 1 Tablet(s) Oral three times a day with meals  lidocaine 2% Gel 1 Application(s) Topical three times a day  witch hazel Pads 1 Application(s) Topical three times a day  zinc oxide 20% Ointment 1 Application(s) Topical two times a day      CODE STATUS:     IMAGING: HISTORY  HPI:  59F with PMH of interstitial lung disease hx of pneumothorax (on 2L NC at home), hx pulmonary nodules, Meniere's disease, Non-Hodgkin's lymphoma (SCD/XRT/chemo at [facility] 2003), hx of CVA (3/2019 - residual L sided weakness, unable to use her L arm), not on ASA or Plavix due to GIB, Seizure disorder, tachycardia, MVP, hx of chronic SDH, presents with weakness, weight loss, nausea, and diarrhea for the past month. Patient admits to 4 lb weight loss in 3 weeks, was seen by her PCP and started on Zofran one week ago. Patient reports going to wound care prior to admission for bed sores, and was advised ED evaluation. Per  at bedside, patient has been having loose BMs several times a day with foul smelling urine for 4 days. Denies fever. Patient also has felt too weak to get out of bed and started using a diaper to urinate/have BMs. Has mainly been wheelchair bound recently due to weakness, but at baseline patient is able to stand and walk short distances. Patient uses 2L NC at home and noticed her SpO2 at 75% on RA with ambulation. Patient was also switched to Oxcarbazepine 2-3 months ago and noticed her symptoms of anxiety, weakness, and "memory" have worsened.  Of note, at night when patient takes her Oxcarbazepine, she starts yelling and having "hallucinations."     In the ED: temp 97.6, HR 97, /80, RR 15, SpO2 92% RA, 98% on 3L NC. WBC 11.10. UA: moderate LEC, WBC 26-50, moderate bacteria. Chest x-ray: Interval enlargement of a right-sided pneumothorax. Small bilateral pleural effusions. Chronic lung fibrotic changes. CT chest/abd/pelvis: Moderate-sized right pneumothorax. Worsening groundglass opacities in both lower lobes, possibly infection. Stercoral colitis. Indeterminant hepatic lesions, possibly metastatic disease. Received IV Rocephin, IV Flagyl, 1L NS Bolus. EKG: NSR 88      24 HOUR EVENTS:  Overnight patient continued with hypoxia resulting in escalating fio2 requirement via high flow nasal cannula. Precedex started for increased agitation overnight. Patient hypotensive this afternoon requiring cessation of precedex. BP responded well. Patient still refusing chest tube placement. Still with increased dyspnea otherwise no acute complaints.     SUBJECTIVE/ROS:  [x ] A ten-point review of systems was otherwise negative except as noted.  [ ] Due to altered mental status/intubation, subjective information were not able to be obtained from the patient. History was obtained, to the extent possible, from review of the chart and collateral sources of information.      NEURO  RASS:  0   GCS:  15   CAM ICU: neg  Exam: Alert and following commands. Makes needs known. No focal deficits.   Meds: acetaminophen   Tablet .. 650 milliGRAM(s) Oral every 6 hours PRN Temp greater or equal to 38C (100.4F), Mild Pain (1 - 3)  dexMEDEtomidine Infusion 0.3 MICROgram(s)/kG/Hr IV Continuous <Continuous>  ondansetron    Tablet 4 milliGRAM(s) Oral every 12 hours PRN Nausea  OXcarbazepine 300 milliGRAM(s) Oral two times a day    [x] Adequacy of sedation and pain control has been assessed and adjusted      RESPIRATORY  RR: 29 (01-11-20 @ 11:15) (24 - 57)  SpO2: 93% (01-11-20 @ 11:15) (84% - 100%)  Wt(kg): --  Exam: mild dyspnea, high flow in place. decreased bs on right.   Mechanical Ventilation:     [ ] Extubation Readiness Assessed  Meds: ALBUTerol    0.083% 2.5 milliGRAM(s) Nebulizer every 6 hours PRN Shortness of Breath and/or Wheezing  sodium chloride 3%  Inhalation 3 milliLiter(s) Inhalation every 6 hours  tiotropium 18 MICROgram(s) Capsule 1 Capsule(s) Inhalation daily        CARDIOVASCULAR  HR: 75 (01-11-20 @ 11:15) (69 - 99)  BP: 95/54 (01-11-20 @ 11:15) (80/50 - 157/103)  BP(mean): 68 (01-11-20 @ 11:15) (60 - 119)  ABP: --  ABP(mean): --  Wt(kg): --  CVP(cm H2O): --      Exam:  Cardiac Rhythm: NSR S1S2  Perfusion     [x ]Adequate   [ ]Inadequate  Mentation   [x ]Normal       [ ]Reduced  Extremities  [x ]Warm         [ ]Cool  Volume Status [ ]Hypervolemic [ x]Euvolemic [ ]Hypovolemic  Meds: propranolol 20 milliGRAM(s) Oral three times a day        GI/NUTRITION  Exam: Soft nt nd, no guarding or rebound  Diet: As toleratd  Meds: pantoprazole    Tablet 40 milliGRAM(s) Oral before breakfast  simethicone 80 milliGRAM(s) Chew two times a day PRN Gas  sucralfate 1 Gram(s) Oral two times a day      GENITOURINARY  I&O's Detail    01-10 @ 07:01 - 01-11 @ 07:00  --------------------------------------------------------  IN:    dexmedetomidine Infusion: 92.2 mL    lactated ringers.: 1200 mL    Oral Fluid: 780 mL    Solution: 50 mL    Solution: 250 mL  Total IN: 2372.2 mL    OUT:    Voided: 700 mL  Total OUT: 700 mL    Total NET: 1672.2 mL      01-11 @ 07:01 - 01-11 @ 13:12  --------------------------------------------------------  IN:    dexmedetomidine Infusion: 12.2 mL    lactated ringers.: 160 mL    Oral Fluid: 120 mL    Solution: 200 mL  Total IN: 492.2 mL    OUT:  Total OUT: 0 mL    Total NET: 492.2 mL          01-11    139  |  96  |  7   ----------------------------<  114<H>  3.0<L>   |  36<H>  |  0.35<L>    Ca    8.1<L>      11 Jan 2020 05:54  Phos  2.9     01-11  Mg     2.2     01-11      [ ] Villatoro catheter, indication: N/A  Meds: lactated ringers. 1000 milliLiter(s) IV Continuous <Continuous>  lactated ringers. 1000 milliLiter(s) IV Continuous <Continuous>  potassium phosphate / sodium phosphate powder 1 Packet(s) Oral three times a day        HEMATOLOGIC  Meds: enoxaparin Injectable 40 milliGRAM(s) SubCutaneous daily    [x] VTE Prophylaxis                        9.3    8.62  )-----------( 449      ( 11 Jan 2020 05:54 )             29.0       Transfusion     [ ] PRBC   [ ] Platelets   [ ] FFP   [ ] Cryoprecipitate      INFECTIOUS DISEASES  T(C): 36.7 (01-11-20 @ 08:00), Max: 37.3 (01-10-20 @ 23:20)  Wt(kg): --  WBC Count: 8.62 K/uL (01-11 @ 05:54)    Recent Cultures:  Specimen Source: .Blood Blood-Peripheral, 01-08 @ 00:16; Results   No growth to date.; Gram Stain: --; Organism: --    Meds: influenza   Vaccine 0.5 milliLiter(s) IntraMuscular once  piperacillin/tazobactam IVPB.. 3.375 Gram(s) IV Intermittent every 8 hours        ENDOCRINE  CAPILLARY BLOOD GLUCOSE    None      Meds:       ACCESS DEVICES:  [x ] Peripheral IV  [ ] Central Venous Line	[ ] R	[ ] L	[ ] IJ	[ ] Fem	[ ] SC	Placed:   [ ] Arterial Line		[ ] R	[ ] L	[ ] Fem	[ ] Rad	[ ] Ax	Placed:   [ ] PICC:					[ ] Mediport  [ ] Urinary Catheter, Date Placed:   [ ] Necessity of urinary, arterial, and venous catheters discussed    OTHER MEDICATIONS:  hydrocortisone 2.5% Rectal Cream 1 Application(s) Rectal two times a day  hydrocortisone hemorrhoidal Suppository 1 Suppository(s) Rectal two times a day  lactobacillus acidophilus 1 Tablet(s) Oral three times a day with meals  lidocaine 2% Gel 1 Application(s) Topical three times a day  witch hazel Pads 1 Application(s) Topical three times a day  zinc oxide 20% Ointment 1 Application(s) Topical two times a day      CODE STATUS: Full    IMAGING: CXR persistent PTx

## 2020-01-11 NOTE — PROGRESS NOTE ADULT - PROBLEM SELECTOR PLAN 2
moderate right pneumothorax, chronic advanced ILD, oxygen dependent  - episodes of tachypnea/dyspnea warranted discussion with CT surgery regarding placement of chest tube.  Pt family and Pt are deferring at this time for treatment however agree that this is a dynamic situation.  Refusing steroids. Refusing CT w/ IV contrast even w/ premedication  - d/w family that patients ILD is in advanced stage would and in the event that pt would need intubation would likely become ventilator dependent   - continue to encourage incentive spirometry use, oob as tolerated  - patient has h/o ILD not on steroids, follows with Dr. [facility].  - Cardiothoracic surgery, Dr. France following  - Pulmonology, Dr. Rashid following - continue Spiriva and kaiden

## 2020-01-11 NOTE — PROGRESS NOTE ADULT - ASSESSMENT
59F with PMH of interstitial lung disease hx of pneumothorax (on 2L NC at home), hx pulmonary nodules, Meniere's disease, Non-Hodgkin's lymphoma (SCD/XRT/chemo at [facility] 2003), hx of CVA (3/2019 - residual L sided weakness, unable to use her L arm), not on ASA or Plavix due to GIB, Seizure disorder, tachycardia, MVP, hx of chronic SDH, presents with weakness, weight loss, nausea, and diarrhea admitted for R pneumothorax now with new fever and worsening ? PTX.

## 2020-01-11 NOTE — PROGRESS NOTE ADULT - PROBLEM SELECTOR PLAN 1
ct showing stercoral colitis c/b rectal pain in setting of hemorrhoids  now improved  monitoring off  bowel regimen   cont lido and hydrocortisone cream   cont anusol supp bid, witch hazel pads prn  rec canasa suppository qhs  monitor exam/gi fxn  up to date w colonoscopy, 4/2019 showed only non bleeding internal hemorrhoids; given persistence of symptoms, offered repeat c-scope but pt defers

## 2020-01-11 NOTE — PROGRESS NOTE ADULT - SUBJECTIVE AND OBJECTIVE BOX
Patient is a 59y old  Female who presents with a chief complaint of pneumothorax, colitis, UTI (11 Jan 2020 13:31)      FROM ADMISSION H+P:   HPI:  59F with PMH of interstitial lung disease hx of pneumothorax (on 2L NC at home), hx pulmonary nodules, Meniere's disease, Non-Hodgkin's lymphoma (SCD/XRT/chemo at [facility] 2003), hx of CVA (3/2019 - residual L sided weakness, unable to use her L arm), not on ASA or Plavix due to GIB, Seizure disorder, tachycardia, MVP, hx of chronic SDH, presents with weakness, weight loss, nausea, and diarrhea for the past month. Patient admits to 4 lb weight loss in 3 weeks, was seen by her PCP and started on Zofran one week ago. Patient reports going to wound care prior to admission for bed sores, and was advised ED evaluation. Per  at bedside, patient has been having loose BMs several times a day with foul smelling urine for 4 days. Denies fever. Patient also has felt too weak to get out of bed and started using a diaper to urinate/have BMs. Has mainly been wheelchair bound recently due to weakness, but at baseline patient is able to stand and walk short distances. Patient uses 2L NC at home and noticed her SpO2 at 75% on RA with ambulation. Patient was also switched to Oxcarbazepine 2-3 months ago and noticed her symptoms of anxiety, weakness, and "memory" have worsened.  Of note, at night when patient takes her Oxcarbazepine, she starts yelling and having "hallucinations."    ----  INTERVAL HPI/OVERNIGHT EVENTS: Pt seen and evaluated at the bedside. No acute overnight events occurred. Pt reports feeling weak and tired. Has been confused at times and more sedated w/ the precedex. Unable to obtain detailed HPI or reliable ROS at this time.     ----  PAST MEDICAL & SURGICAL HISTORY:  Interstitial lung disease: on home o2 prn  NHL (non-Hodgkin's lymphoma): Agem 43 sp chemo/rt/stem cell  Transient cerebral ischemia, unspecified type  Mitral prolapse  History of tonsillectomy  History of appendectomy      FAMILY HISTORY:  Family history of stroke  Family history of breast cancer: Mother      Allergies    IV Contrast (Anaphylaxis)  shellfish. (Anaphylaxis)    Intolerances        ----  REVIEW OF SYSTEMS:  unable to obtain full 10 system ROS as pt is on precedex and is an unreliable historian    ----  PHYSICAL EXAM:  GENERAL: patient appears chronically ill and debilitated, response time slowed, speaking in simple 1 word answers, profoundly weak, unreliable historian  ENMT: oropharynx clear without erythema, moist mucous membranes  NECK: supple, soft, no thyromegaly noted  LUNGS: coarse rhonchi auscultated throughout mid chest b/l but L>R   HEART: soft S1/S2, regular rate and rhythm, no murmurs noted, no noted edema to b/l LE  GASTROINTESTINAL: abdomen is soft, nontender, nondistended, normoactive bowel sounds, no palpable masses  INTEGUMENT: good skin turgor, appropriate for ethnicity, appears well perfused, no jaundice noted  MUSCULOSKELETAL: no clubbing or cyanosis, no obvious deformity  NEUROLOGIC: awake, alert, oriented but declining to answer orientation questions, good muscle tone in 3 extremities, LUE wrist is contracted w/ brace in place    T(C): 37.6 (01-11-20 @ 16:00), Max: 37.6 (01-11-20 @ 16:00)  HR: 133 (01-11-20 @ 18:00) (69 - 133)  BP: 172/87 (01-11-20 @ 18:00) (72/47 - 172/87)  RR: 49 (01-11-20 @ 18:00) (24 - 59)  SpO2: 91% (01-11-20 @ 18:00) (77% - 100%)  Wt(kg): --    ----  I&O's Summary    10 Mauricio 2020 07:01  -  11 Jan 2020 07:00  --------------------------------------------------------  IN: 2372.2 mL / OUT: 700 mL / NET: 1672.2 mL    11 Jan 2020 07:01  -  11 Jan 2020 18:24  --------------------------------------------------------  IN: 492.2 mL / OUT: 0 mL / NET: 492.2 mL        LABS:                        9.3    8.62  )-----------( 449      ( 11 Jan 2020 05:54 )             29.0     01-11    136  |  93<L>  |  8   ----------------------------<  163<H>  3.5   |  36<H>  |  0.54    Ca    8.4<L>      11 Jan 2020 15:10  Phos  2.9     01-11  Mg     2.2     01-11          CAPILLARY BLOOD GLUCOSE          01-08 @ 00:16   No growth to date.  --  --            ----  Personally reviewed:  Vital sign trends: [ x ] yes    [  ] no     [  ] n/a  Laboratory results: [ x ] yes    [  ] no     [  ] n/a  Radiology results: [  ] yes    [  ] no     [ x ] n/a  Culture results: [ x ] yes    [  ] no     [  ] n/a  Consultant recommendations: [ x ] yes    [  ] no     [  ] n/a

## 2020-01-11 NOTE — PROGRESS NOTE ADULT - PROBLEM SELECTOR PLAN 1
most likely 2/2 hospital acquired pna (suspect gram negative rods) with hypoxia   - ID consulted (Ermias) recommending continue with Zosyn for now and that aspiration remains in differential  - PT reeval once patient is more stable

## 2020-01-11 NOTE — PROGRESS NOTE ADULT - PROBLEM SELECTOR PLAN 1
r ptx -   stable PTX -   ILD   may need systemic steroids - will discuss with ICU team -   remains on High Flow NC - wean as tolerated  discussed case with Dr. Bettye Hazel - limited work up and input - as pt was non cooperative and non compliant  discussed with  current status  on emp ABX   monitor vs and HD and Sat  ICU supportive care and regimen

## 2020-01-11 NOTE — PROGRESS NOTE ADULT - SUBJECTIVE AND OBJECTIVE BOX
Date/Time Patient Seen:  		  Referring MD:   Data Reviewed	       Patient is a 59y old  Female who presents with a chief complaint of pneumothorax, colitis, UTI (10 Mauricio 2020 20:52)      Subjective/HPI     PAST MEDICAL & SURGICAL HISTORY:  Interstitial lung disease: on home o2 prn  NHL (non-Hodgkin's lymphoma): Agem 43 sp chemo/rt/stem cell  Transient cerebral ischemia, unspecified type  Mitral prolapse  Pulmonary disease  History of tonsillectomy  History of appendectomy        Medication list         MEDICATIONS  (STANDING):  dexMEDEtomidine Infusion 0.3 MICROgram(s)/kG/Hr (4.08 mL/Hr) IV Continuous <Continuous>  enoxaparin Injectable 40 milliGRAM(s) SubCutaneous daily  hydrocortisone 2.5% Rectal Cream 1 Application(s) Rectal two times a day  hydrocortisone hemorrhoidal Suppository 1 Suppository(s) Rectal two times a day  influenza   Vaccine 0.5 milliLiter(s) IntraMuscular once  lactated ringers. 1000 milliLiter(s) (80 mL/Hr) IV Continuous <Continuous>  lactated ringers. 1000 milliLiter(s) (80 mL/Hr) IV Continuous <Continuous>  lactobacillus acidophilus 1 Tablet(s) Oral three times a day with meals  lidocaine 2% Gel 1 Application(s) Topical three times a day  OXcarbazepine 300 milliGRAM(s) Oral two times a day  pantoprazole    Tablet 40 milliGRAM(s) Oral before breakfast  piperacillin/tazobactam IVPB.. 3.375 Gram(s) IV Intermittent every 8 hours  potassium chloride  10 mEq/100 mL IVPB 10 milliEquivalent(s) IV Intermittent every 1 hour  potassium phosphate / sodium phosphate powder 1 Packet(s) Oral three times a day  propranolol 20 milliGRAM(s) Oral three times a day  sodium chloride 3%  Inhalation 3 milliLiter(s) Inhalation every 6 hours  sucralfate 1 Gram(s) Oral two times a day  tiotropium 18 MICROgram(s) Capsule 1 Capsule(s) Inhalation daily  witch hazel Pads 1 Application(s) Topical three times a day  zinc oxide 20% Ointment 1 Application(s) Topical two times a day    MEDICATIONS  (PRN):  acetaminophen   Tablet .. 650 milliGRAM(s) Oral every 6 hours PRN Temp greater or equal to 38C (100.4F), Mild Pain (1 - 3)  ALBUTerol    0.083% 2.5 milliGRAM(s) Nebulizer every 6 hours PRN Shortness of Breath and/or Wheezing  ondansetron    Tablet 4 milliGRAM(s) Oral every 12 hours PRN Nausea  simethicone 80 milliGRAM(s) Chew two times a day PRN Gas         Vitals log        ICU Vital Signs Last 24 Hrs  T(C): 37.2 (11 Jan 2020 03:37), Max: 37.3 (10 Mauricio 2020 23:20)  T(F): 98.9 (11 Jan 2020 03:37), Max: 99.1 (10 Mauricio 2020 23:20)  HR: 75 (11 Jan 2020 06:00) (75 - 102)  BP: 96/70 (11 Jan 2020 06:00) (91/60 - 157/103)  BP(mean): 79 (11 Jan 2020 06:00) (66 - 119)  ABP: --  ABP(mean): --  RR: 26 (11 Jan 2020 06:00) (19 - 57)  SpO2: 100% (11 Jan 2020 06:00) (79% - 100%)           Input and Output:  I&O's Detail    09 Jan 2020 07:01  -  10 Mauricio 2020 07:00  --------------------------------------------------------  IN:    dexmedetomidine Infusion: 44 mL    lactated ringers.: 1600 mL    Oral Fluid: 360 mL    Solution: 275 mL  Total IN: 2279 mL    OUT:    Voided: 750 mL  Total OUT: 750 mL    Total NET: 1529 mL      10 Mauricio 2020 07:01  -  11 Jan 2020 06:59  --------------------------------------------------------  IN:    dexmedetomidine Infusion: 44.6 mL    lactated ringers.: 1200 mL    Oral Fluid: 780 mL    Solution: 200 mL    Solution: 50 mL  Total IN: 2274.6 mL    OUT:    Voided: 200 mL  Total OUT: 200 mL    Total NET: 2074.6 mL          Lab Data                        9.3    8.62  )-----------( 449      ( 11 Jan 2020 05:54 )             29.0     01-11    139  |  96  |  7   ----------------------------<  114<H>  3.0<L>   |  36<H>  |  0.35<L>    Ca    8.1<L>      11 Jan 2020 05:54  Phos  2.9     01-11  Mg     2.2     01-11      ABG - ( 09 Jan 2020 10:10 )  pH, Arterial: 7.42  pH, Blood: x     /  pCO2: 56    /  pO2: 62    / HCO3: 33    / Base Excess: 10.2  /  SaO2: 91                      Review of Systems	      Objective     Physical Examination    head at  heart s1s2  lung dec BS  abd soft      Pertinent Lab findings & Imaging      Shauna:  NO   Adequate UO     I&O's Detail    09 Jan 2020 07:01  -  10 Mauricio 2020 07:00  --------------------------------------------------------  IN:    dexmedetomidine Infusion: 44 mL    lactated ringers.: 1600 mL    Oral Fluid: 360 mL    Solution: 275 mL  Total IN: 2279 mL    OUT:    Voided: 750 mL  Total OUT: 750 mL    Total NET: 1529 mL      10 Mauricio 2020 07:01  -  11 Jan 2020 06:59  --------------------------------------------------------  IN:    dexmedetomidine Infusion: 44.6 mL    lactated ringers.: 1200 mL    Oral Fluid: 780 mL    Solution: 200 mL    Solution: 50 mL  Total IN: 2274.6 mL    OUT:    Voided: 200 mL  Total OUT: 200 mL    Total NET: 2074.6 mL               Discussed with:     Cultures:	        Radiology

## 2020-01-12 LAB
ANION GAP SERPL CALC-SCNC: 8 MMOL/L — SIGNIFICANT CHANGE UP (ref 5–17)
BASOPHILS # BLD AUTO: 0.03 K/UL — SIGNIFICANT CHANGE UP (ref 0–0.2)
BASOPHILS NFR BLD AUTO: 0.2 % — SIGNIFICANT CHANGE UP (ref 0–2)
BUN SERPL-MCNC: 8 MG/DL — SIGNIFICANT CHANGE UP (ref 7–23)
CALCIUM SERPL-MCNC: 9.3 MG/DL — SIGNIFICANT CHANGE UP (ref 8.5–10.1)
CHLORIDE SERPL-SCNC: 93 MMOL/L — LOW (ref 96–108)
CO2 SERPL-SCNC: 35 MMOL/L — HIGH (ref 22–31)
CREAT SERPL-MCNC: 0.4 MG/DL — LOW (ref 0.5–1.3)
EOSINOPHIL # BLD AUTO: 0 K/UL — SIGNIFICANT CHANGE UP (ref 0–0.5)
EOSINOPHIL NFR BLD AUTO: 0 % — SIGNIFICANT CHANGE UP (ref 0–6)
GLUCOSE SERPL-MCNC: 137 MG/DL — HIGH (ref 70–99)
HCT VFR BLD CALC: 36.4 % — SIGNIFICANT CHANGE UP (ref 34.5–45)
HGB BLD-MCNC: 11.4 G/DL — LOW (ref 11.5–15.5)
IMM GRANULOCYTES NFR BLD AUTO: 0.6 % — SIGNIFICANT CHANGE UP (ref 0–1.5)
LYMPHOCYTES # BLD AUTO: 0.63 K/UL — LOW (ref 1–3.3)
LYMPHOCYTES # BLD AUTO: 3.5 % — LOW (ref 13–44)
MAGNESIUM SERPL-MCNC: 2.1 MG/DL — SIGNIFICANT CHANGE UP (ref 1.6–2.6)
MCHC RBC-ENTMCNC: 28 PG — SIGNIFICANT CHANGE UP (ref 27–34)
MCHC RBC-ENTMCNC: 31.3 GM/DL — LOW (ref 32–36)
MCV RBC AUTO: 89.4 FL — SIGNIFICANT CHANGE UP (ref 80–100)
MONOCYTES # BLD AUTO: 0.53 K/UL — SIGNIFICANT CHANGE UP (ref 0–0.9)
MONOCYTES NFR BLD AUTO: 3 % — SIGNIFICANT CHANGE UP (ref 2–14)
NEUTROPHILS # BLD AUTO: 16.64 K/UL — HIGH (ref 1.8–7.4)
NEUTROPHILS NFR BLD AUTO: 92.7 % — HIGH (ref 43–77)
NRBC # BLD: 0 /100 WBCS — SIGNIFICANT CHANGE UP (ref 0–0)
PHOSPHATE SERPL-MCNC: 3.6 MG/DL — SIGNIFICANT CHANGE UP (ref 2.5–4.5)
PLATELET # BLD AUTO: 559 K/UL — HIGH (ref 150–400)
POTASSIUM SERPL-MCNC: 3.9 MMOL/L — SIGNIFICANT CHANGE UP (ref 3.5–5.3)
POTASSIUM SERPL-SCNC: 3.9 MMOL/L — SIGNIFICANT CHANGE UP (ref 3.5–5.3)
RBC # BLD: 4.07 M/UL — SIGNIFICANT CHANGE UP (ref 3.8–5.2)
RBC # FLD: 14.2 % — SIGNIFICANT CHANGE UP (ref 10.3–14.5)
SODIUM SERPL-SCNC: 136 MMOL/L — SIGNIFICANT CHANGE UP (ref 135–145)
WBC # BLD: 17.94 K/UL — HIGH (ref 3.8–10.5)
WBC # FLD AUTO: 17.94 K/UL — HIGH (ref 3.8–10.5)

## 2020-01-12 PROCEDURE — 99233 SBSQ HOSP IP/OBS HIGH 50: CPT

## 2020-01-12 PROCEDURE — 71045 X-RAY EXAM CHEST 1 VIEW: CPT | Mod: 26

## 2020-01-12 PROCEDURE — 99291 CRITICAL CARE FIRST HOUR: CPT

## 2020-01-12 RX ORDER — MORPHINE SULFATE 50 MG/1
1 CAPSULE, EXTENDED RELEASE ORAL EVERY 4 HOURS
Refills: 0 | Status: DISCONTINUED | OUTPATIENT
Start: 2020-01-12 | End: 2020-01-12

## 2020-01-12 RX ORDER — LIDOCAINE HCL 20 MG/ML
5 VIAL (ML) INJECTION ONCE
Refills: 0 | Status: DISCONTINUED | OUTPATIENT
Start: 2020-01-12 | End: 2020-01-12

## 2020-01-12 RX ORDER — ALBUMIN HUMAN 25 %
250 VIAL (ML) INTRAVENOUS EVERY 6 HOURS
Refills: 0 | Status: COMPLETED | OUTPATIENT
Start: 2020-01-12 | End: 2020-01-13

## 2020-01-12 RX ORDER — LIDOCAINE HCL 20 MG/ML
5 VIAL (ML) INJECTION ONCE
Refills: 0 | Status: COMPLETED | OUTPATIENT
Start: 2020-01-12 | End: 2020-01-12

## 2020-01-12 RX ORDER — SODIUM CHLORIDE 9 MG/ML
500 INJECTION, SOLUTION INTRAVENOUS ONCE
Refills: 0 | Status: COMPLETED | OUTPATIENT
Start: 2020-01-12 | End: 2020-01-12

## 2020-01-12 RX ORDER — MORPHINE SULFATE 50 MG/1
1 CAPSULE, EXTENDED RELEASE ORAL ONCE
Refills: 0 | Status: DISCONTINUED | OUTPATIENT
Start: 2020-01-12 | End: 2020-01-12

## 2020-01-12 RX ORDER — SODIUM CHLORIDE 9 MG/ML
1000 INJECTION, SOLUTION INTRAVENOUS
Refills: 0 | Status: DISCONTINUED | OUTPATIENT
Start: 2020-01-12 | End: 2020-01-14

## 2020-01-12 RX ORDER — NOREPINEPHRINE BITARTRATE/D5W 8 MG/250ML
0.05 PLASTIC BAG, INJECTION (ML) INTRAVENOUS
Qty: 8 | Refills: 0 | Status: DISCONTINUED | OUTPATIENT
Start: 2020-01-12 | End: 2020-01-14

## 2020-01-12 RX ADMIN — Medication 0.25 MILLIGRAM(S): at 05:32

## 2020-01-12 RX ADMIN — Medication 1 PACKET(S): at 05:32

## 2020-01-12 RX ADMIN — MORPHINE SULFATE 1 MILLIGRAM(S): 50 CAPSULE, EXTENDED RELEASE ORAL at 23:15

## 2020-01-12 RX ADMIN — Medication 60 MILLIGRAM(S): at 17:55

## 2020-01-12 RX ADMIN — Medication 1 GRAM(S): at 17:57

## 2020-01-12 RX ADMIN — Medication 5.1 MICROGRAM(S)/KG/MIN: at 08:43

## 2020-01-12 RX ADMIN — MORPHINE SULFATE 1 MILLIGRAM(S): 50 CAPSULE, EXTENDED RELEASE ORAL at 17:53

## 2020-01-12 RX ADMIN — PIPERACILLIN AND TAZOBACTAM 25 GRAM(S): 4; .5 INJECTION, POWDER, LYOPHILIZED, FOR SOLUTION INTRAVENOUS at 13:49

## 2020-01-12 RX ADMIN — Medication 1000 MILLIGRAM(S): at 21:29

## 2020-01-12 RX ADMIN — Medication 1 TABLET(S): at 18:00

## 2020-01-12 RX ADMIN — MORPHINE SULFATE 1 MILLIGRAM(S): 50 CAPSULE, EXTENDED RELEASE ORAL at 15:55

## 2020-01-12 RX ADMIN — Medication 60 MILLIGRAM(S): at 05:33

## 2020-01-12 RX ADMIN — OXCARBAZEPINE 300 MILLIGRAM(S): 300 TABLET, FILM COATED ORAL at 21:29

## 2020-01-12 RX ADMIN — Medication 5 MILLILITER(S): at 09:20

## 2020-01-12 RX ADMIN — ALBUTEROL 2.5 MILLIGRAM(S): 90 AEROSOL, METERED ORAL at 01:58

## 2020-01-12 RX ADMIN — Medication 1 APPLICATION(S): at 21:30

## 2020-01-12 RX ADMIN — AER TRAVELER 1 APPLICATION(S): 0.5 SOLUTION RECTAL; TOPICAL at 05:34

## 2020-01-12 RX ADMIN — ENOXAPARIN SODIUM 40 MILLIGRAM(S): 100 INJECTION SUBCUTANEOUS at 12:21

## 2020-01-12 RX ADMIN — LIDOCAINE 1 APPLICATION(S): 4 CREAM TOPICAL at 13:50

## 2020-01-12 RX ADMIN — OXCARBAZEPINE 300 MILLIGRAM(S): 300 TABLET, FILM COATED ORAL at 09:47

## 2020-01-12 RX ADMIN — MORPHINE SULFATE 1 MILLIGRAM(S): 50 CAPSULE, EXTENDED RELEASE ORAL at 18:23

## 2020-01-12 RX ADMIN — MORPHINE SULFATE 1 MILLIGRAM(S): 50 CAPSULE, EXTENDED RELEASE ORAL at 09:50

## 2020-01-12 RX ADMIN — DEXMEDETOMIDINE HYDROCHLORIDE IN 0.9% SODIUM CHLORIDE 4.08 MICROGRAM(S)/KG/HR: 4 INJECTION INTRAVENOUS at 12:00

## 2020-01-12 RX ADMIN — Medication 125 MILLILITER(S): at 12:10

## 2020-01-12 RX ADMIN — Medication 650 MILLIGRAM(S): at 04:15

## 2020-01-12 RX ADMIN — Medication 650 MILLIGRAM(S): at 03:19

## 2020-01-12 RX ADMIN — MORPHINE SULFATE 1 MILLIGRAM(S): 50 CAPSULE, EXTENDED RELEASE ORAL at 10:20

## 2020-01-12 RX ADMIN — ZINC OXIDE 1 APPLICATION(S): 200 OINTMENT TOPICAL at 05:34

## 2020-01-12 RX ADMIN — Medication 1 APPLICATION(S): at 09:48

## 2020-01-12 RX ADMIN — LIDOCAINE 1 APPLICATION(S): 4 CREAM TOPICAL at 05:34

## 2020-01-12 RX ADMIN — DEXMEDETOMIDINE HYDROCHLORIDE IN 0.9% SODIUM CHLORIDE 4.08 MICROGRAM(S)/KG/HR: 4 INJECTION INTRAVENOUS at 07:49

## 2020-01-12 RX ADMIN — Medication 125 MILLILITER(S): at 19:15

## 2020-01-12 RX ADMIN — MORPHINE SULFATE 1 MILLIGRAM(S): 50 CAPSULE, EXTENDED RELEASE ORAL at 15:25

## 2020-01-12 RX ADMIN — PANTOPRAZOLE SODIUM 40 MILLIGRAM(S): 20 TABLET, DELAYED RELEASE ORAL at 05:32

## 2020-01-12 RX ADMIN — Medication 1 SUPPOSITORY(S): at 05:34

## 2020-01-12 RX ADMIN — Medication 1 PACKET(S): at 21:29

## 2020-01-12 RX ADMIN — ZINC OXIDE 1 APPLICATION(S): 200 OINTMENT TOPICAL at 17:58

## 2020-01-12 RX ADMIN — Medication 1 SUPPOSITORY(S): at 17:55

## 2020-01-12 RX ADMIN — PIPERACILLIN AND TAZOBACTAM 25 GRAM(S): 4; .5 INJECTION, POWDER, LYOPHILIZED, FOR SOLUTION INTRAVENOUS at 05:32

## 2020-01-12 RX ADMIN — SODIUM CHLORIDE 100 MILLILITER(S): 9 INJECTION, SOLUTION INTRAVENOUS at 09:15

## 2020-01-12 RX ADMIN — AER TRAVELER 1 APPLICATION(S): 0.5 SOLUTION RECTAL; TOPICAL at 21:29

## 2020-01-12 RX ADMIN — LIDOCAINE 1 APPLICATION(S): 4 CREAM TOPICAL at 21:30

## 2020-01-12 RX ADMIN — SODIUM CHLORIDE 1000 MILLILITER(S): 9 INJECTION, SOLUTION INTRAVENOUS at 08:25

## 2020-01-12 RX ADMIN — Medication 1 GRAM(S): at 05:33

## 2020-01-12 RX ADMIN — AER TRAVELER 1 APPLICATION(S): 0.5 SOLUTION RECTAL; TOPICAL at 13:50

## 2020-01-12 RX ADMIN — MORPHINE SULFATE 1 MILLIGRAM(S): 50 CAPSULE, EXTENDED RELEASE ORAL at 22:53

## 2020-01-12 RX ADMIN — PIPERACILLIN AND TAZOBACTAM 25 GRAM(S): 4; .5 INJECTION, POWDER, LYOPHILIZED, FOR SOLUTION INTRAVENOUS at 21:29

## 2020-01-12 NOTE — PROGRESS NOTE ADULT - PROBLEM SELECTOR PLAN 1
ct showing stercoral colitis c/b rectal pain in setting of hemorrhoids  now improved  monitoring off  bowel regimen   cont lido and hydrocortisone cream   cont anusol supp bid, witch hazel pads prn  cont canasa suppository  monitor exam/gi fxn  up to date w colonoscopy, 4/2019 showed only non bleeding internal hemorrhoids; given persistence of symptoms, offered repeat c-scope but pt defers

## 2020-01-12 NOTE — PROGRESS NOTE ADULT - PROBLEM SELECTOR PLAN 3
multifactorial  on hi flow  s/p chest tube placement this morning  pulm eval noted  cont abx per id  cts eval noted  asp prec  per pulm and icu

## 2020-01-12 NOTE — PROGRESS NOTE ADULT - SUBJECTIVE AND OBJECTIVE BOX
Patient is a 59y old  Female who presents with a chief complaint of pneumothorax, colitis, UTI (12 Jan 2020 10:30)      FROM ADMISSION H+P:   HPI:  59F with PMH of interstitial lung disease hx of pneumothorax (on 2L NC at home), hx pulmonary nodules, Meniere's disease, Non-Hodgkin's lymphoma (SCD/XRT/chemo at [facility] 2003), hx of CVA (3/2019 - residual L sided weakness, unable to use her L arm), not on ASA or Plavix due to GIB, Seizure disorder, tachycardia, MVP, hx of chronic SDH, presents with weakness, weight loss, nausea, and diarrhea for the past month. Patient admits to 4 lb weight loss in 3 weeks, was seen by her PCP and started on Zofran one week ago. Patient reports going to wound care prior to admission for bed sores, and was advised ED evaluation. Per  at bedside, patient has been having loose BMs several times a day with foul smelling urine for 4 days. Denies fever. Patient also has felt too weak to get out of bed and started using a diaper to urinate/have BMs. Has mainly been wheelchair bound recently due to weakness, but at baseline patient is able to stand and walk short distances. Patient uses 2L NC at home and noticed her SpO2 at 75% on RA with ambulation. Patient was also switched to Oxcarbazepine 2-3 months ago and noticed her symptoms of anxiety, weakness, and "memory" have worsened.  Of note, at night when patient takes her Oxcarbazepine, she starts yelling and having "hallucinations."    ----  INTERVAL HPI/OVERNIGHT EVENTS: Pt seen and evaluated at the bedside. No acute overnight events occurred. Pt had R sided pigtail placed this morning w/ CT Sx. Imaging reviewed w/ MICU team. O2 requirements about the same. Had to go up on precedex overnight which increased lethargy w/ worsening hypotension and hypotension so the patient was started on vasopressors. BP's have been controlled w/ this. Also started on albumin. Pt is confused on precedex and remains an unreliable historian. Tachypnea markedly improved w/ precedex.     ----  PAST MEDICAL & SURGICAL HISTORY:  Interstitial lung disease: on home o2 prn  NHL (non-Hodgkin's lymphoma): Agem 43 sp chemo/rt/stem cell  Transient cerebral ischemia, unspecified type  Mitral prolapse  History of tonsillectomy  History of appendectomy      FAMILY HISTORY:  Family history of stroke  Family history of breast cancer: Mother      Allergies    IV Contrast (Anaphylaxis)  shellfish. (Anaphylaxis)    Intolerances        ----  REVIEW OF SYSTEMS:  unable to obtain full 10 system ROS as pt is on precedex and is an unreliable historian    ----  PHYSICAL EXAM:  GENERAL: patient appears chronically ill and debilitated, speech is slurred, profoundly weak, unreliable historian  ENMT: oropharynx clear without erythema, moist mucous membranes  NECK: supple, soft, no thyromegaly noted  LUNGS: coarse rhonchi auscultated throughout mid chest b/l but L>R . R sided CT in place now without any drainage  HEART: soft S1/S2, regular rate and rhythm, no murmurs noted, no noted edema to b/l LE  GASTROINTESTINAL: abdomen is soft, nontender, minimally distended, active bowel sounds, no palpable masses  INTEGUMENT: good skin turgor, appropriate for ethnicity, appears well perfused, no jaundice noted  MUSCULOSKELETAL: no clubbing or cyanosis, no obvious deformity  NEUROLOGIC: awake, alert, oriented but declining to answer orientation questions, good muscle tone in 3 extremities, LUE wrist is contracted w/ brace in place    T(C): 35.9 (01-12-20 @ 15:38), Max: 37.1 (01-12-20 @ 00:11)  HR: 69 (01-12-20 @ 16:20) (55 - 133)  BP: 88/52 (01-12-20 @ 16:20) (62/38 - 172/87)  RR: 24 (01-12-20 @ 16:20) (20 - 49)  SpO2: 99% (01-12-20 @ 16:20) (86% - 100%)  Wt(kg): --    ----  I&O's Summary    11 Jan 2020 07:01  -  12 Jan 2020 07:00  --------------------------------------------------------  IN: 1597.4 mL / OUT: 0 mL / NET: 1597.4 mL        LABS:                        11.4   17.94 )-----------( 559      ( 12 Jan 2020 05:40 )             36.4     01-12    136  |  93<L>  |  8   ----------------------------<  137<H>  3.9   |  35<H>  |  0.40<L>    Ca    9.3      12 Jan 2020 05:40  Phos  3.6     01-12  Mg     2.1     01-12          CAPILLARY BLOOD GLUCOSE                    ----  Personally reviewed:  Vital sign trends: [ x ] yes    [  ] no     [  ] n/a  Laboratory results: [ x ] yes    [  ] no     [  ] n/a  Radiology results: [ x ] yes    [  ] no     [  ] n/a  Culture results: [  ] yes    [  ] no     [ x ] n/a  Consultant recommendations: [ x ] yes    [  ] no     [  ] n/a

## 2020-01-12 NOTE — PROGRESS NOTE ADULT - PROBLEM SELECTOR PLAN 1
on steroids now - IV  poss Chest tube placement on Monday - non emergent - thoracic following -   supportive ICU management  wean o2 support as tolerated  I jesus  monitor vs and HD and Sat  on emp ABX to tx poss LRTI  on bronchodilators  discussed case and prognosis with family / ICU team and pt's outside Pulm Doc - Dr. Bettye Hazel

## 2020-01-12 NOTE — PROGRESS NOTE ADULT - SUBJECTIVE AND OBJECTIVE BOX
24 hour events:   had anxiety thoughout the night last night  FiO2 requirements at 100% FiO2 and 40L flow      T(F): 98 (01-12-20 @ 04:12), Max: 99.7 (01-11-20 @ 16:00)  HR: 71 (01-12-20 @ 07:00) (69 - 133)  BP: 91/52 (01-12-20 @ 07:00) (72/47 - 172/87)  RR: 21 (01-12-20 @ 07:00) (20 - 59)  SpO2: 100% (01-12-20 @ 07:00) (77% - 100%)  Wt(kg): --      01-11-20 @ 07:01  -  01-12-20 @ 07:00  --------------------------------------------------------  IN: 1597.4 mL / OUT: 0 mL / NET: 1597.4 mL        CAPILLARY BLOOD GLUCOSE          I&O's Summary    11 Jan 2020 07:01  -  12 Jan 2020 07:00  --------------------------------------------------------  IN: 1597.4 mL / OUT: 0 mL / NET: 1597.4 mL        Physical Exam:   Gen:  Neuro:  HEENT:  Resp:  CVS:  Abd:  Ext:  Skin:    Meds:  piperacillin/tazobactam IVPB.. IV Intermittent    propranolol Oral    methylPREDNISolone sodium succinate Injectable IV Push    ALBUTerol    0.083% Nebulizer PRN  tiotropium 18 MICROgram(s) Capsule Inhalation    acetaminophen   Tablet .. Oral PRN  ALPRAZolam Oral PRN  dexMEDEtomidine Infusion IV Continuous  morphine  - Injectable IV Push  ondansetron    Tablet Oral PRN  OXcarbazepine Oral      enoxaparin Injectable SubCutaneous    mesalamine Suppository Rectal  pantoprazole    Tablet Oral  simethicone Chew PRN  sucralfate Oral      potassium phosphate / sodium phosphate powder Oral    influenza   Vaccine IntraMuscular    hydrocortisone 2.5% Rectal Cream Rectal  hydrocortisone hemorrhoidal Suppository Rectal  lidocaine 2% Gel Topical  witch hazel Pads Topical  zinc oxide 20% Ointment Topical    lactobacillus acidophilus Oral                            11.4   17.94 )-----------( 559      ( 12 Jan 2020 05:40 )             36.4       01-12    136  |  93<L>  |  8   ----------------------------<  137<H>  3.9   |  35<H>  |  0.40<L>    Ca    9.3      12 Jan 2020 05:40  Phos  3.6     01-12  Mg     2.1     01-12                .Blood Blood-Peripheral   No growth to date. -- 01-08 @ 00:16      Rapid RVP Result: Octaviotec (01-08 @ 10:25)          Radiology: ***  Bedside ultrasound: ***    CENTRAL LINE: N/Y          DATE INSERTED:              REMOVE: Y/N  LUKE: N/Y                       DATE INSERTED:              REMOVE: Y/N  A-LINE: N/Y                       DATE INSERTED:              REMOVE: Y/N    GLOBAL ISSUE/BEST PRACTICE:  Analgesia:  Sedation:  CAM-ICU:   HOB elevation: yes  Stress ulcer prophylaxis:  VTE prophylaxis:  Glycemic control:  Nutrition:    CODE STATUS: *** 24 hour events:   last evening pt seemed more agreeable to chest tube  had anxiety thoughout the night last night  received xanax x 1 for panic attach, morphine for wrist pain  FiO2 requirements at 100% FiO2 and 40L flow this am  this am will only say she wants to go home, when I explained she's too sick to go home she said that's not true   says she had hallucinations      T(F): 98 (01-12-20 @ 04:12), Max: 99.7 (01-11-20 @ 16:00)  HR: 71 (01-12-20 @ 07:00) (69 - 133)  BP: 91/52 (01-12-20 @ 07:00) (72/47 - 172/87)  RR: 21 (01-12-20 @ 07:00) (20 - 59)  SpO2: 100% (01-12-20 @ 07:00) (77% - 100%)  Wt(kg): --      01-11-20 @ 07:01  -  01-12-20 @ 07:00  --------------------------------------------------------  IN: 1597.4 mL / OUT: 0 mL / NET: 1597.4 mL        CAPILLARY BLOOD GLUCOSE          I&O's Summary    11 Jan 2020 07:01  -  12 Jan 2020 07:00  --------------------------------------------------------  IN: 1597.4 mL / OUT: 0 mL / NET: 1597.4 mL        Physical Exam:   Gen: frail and cachetic, anxious and moaning  Neuro: somnolent  Resp: coarse crackles b/l with decreased sounds on R  CVS: tachy, regular  Abd: soft, NTND  Ext: no edema  Skin: WWP    Meds:  piperacillin/tazobactam IVPB.. IV Intermittent    propranolol Oral    methylPREDNISolone sodium succinate Injectable IV Push    ALBUTerol    0.083% Nebulizer PRN  tiotropium 18 MICROgram(s) Capsule Inhalation    acetaminophen   Tablet .. Oral PRN  ALPRAZolam Oral PRN  dexMEDEtomidine Infusion IV Continuous  morphine  - Injectable IV Push  ondansetron    Tablet Oral PRN  OXcarbazepine Oral      enoxaparin Injectable SubCutaneous    mesalamine Suppository Rectal  pantoprazole    Tablet Oral  simethicone Chew PRN  sucralfate Oral      potassium phosphate / sodium phosphate powder Oral    influenza   Vaccine IntraMuscular    hydrocortisone 2.5% Rectal Cream Rectal  hydrocortisone hemorrhoidal Suppository Rectal  lidocaine 2% Gel Topical  witch hazel Pads Topical  zinc oxide 20% Ointment Topical    lactobacillus acidophilus Oral                            11.4   17.94 )-----------( 559      ( 12 Jan 2020 05:40 )             36.4       01-12    136  |  93<L>  |  8   ----------------------------<  137<H>  3.9   |  35<H>  |  0.40<L>    Ca    9.3      12 Jan 2020 05:40  Phos  3.6     01-12  Mg     2.1     01-12                .Blood Blood-Peripheral   No growth to date. -- 01-08 @ 00:16      Rapid RVP Result: NotDetec (01-08 @ 10:25)          Radiology: ***  < from: Xray Chest 1 View- PORTABLE-Routine (01.11.20 @ 17:03) >  EXAM:  XR CHEST PORTABLE ROUTINE 1V                            PROCEDURE DATE:  01/11/2020          INTERPRETATION:  Chest one view    HISTORY: Chronic pneumothorax    COMPARISON STUDY: 1/9/2020    Frontal expiratory view of the chest shows the heart to be similar in size. Right apical pneumothorax is similar. The lungs show progression of right infiltrates and small left effusion is similar.    IMPRESSION:  Right pneumothorax unchanged. Progression of infiltrates.    Thank you for the courtesyof this referral.    < end of copied text >      CENTRAL LINE: N  LUKE: N  A-LINE: N    GLOBAL ISSUE/BEST PRACTICE:  Analgesia: Y  Sedation:  Y  HOB elevation: yes  Stress ulcer prophylaxis: Y  VTE prophylaxis: Y  Glycemic control: Y  Nutrition: Y    CODE STATUS: FULL

## 2020-01-12 NOTE — PROGRESS NOTE ADULT - PROBLEM SELECTOR PLAN 2
moderate right pneumothorax, chronic advanced ILD, chronically O2 dependent  - episodes of tachypnea/dyspnea warranted discussion with CT surgery regarding placement of CT.    - CT placed today by thoracic surgery for hopeful improvement in O2 requirement and to improve increased work of breathing  - steroids started 60q12h  - pt is not alert enough to consent at this juncture and family has consented to therapy to this point  - overall prognosis is very poor

## 2020-01-12 NOTE — PROGRESS NOTE ADULT - SUBJECTIVE AND OBJECTIVE BOX
Date/Time Patient Seen:  		  Referring MD:   Data Reviewed	       Patient is a 59y old  Female who presents with a chief complaint of pneumothorax, colitis, UTI (12 Jan 2020 08:07)      Subjective/HPI     PAST MEDICAL & SURGICAL HISTORY:  Interstitial lung disease: on home o2 prn  NHL (non-Hodgkin's lymphoma): Agem 45 sp chemo/rt/stem cell  Transient cerebral ischemia, unspecified type  Mitral prolapse  Pulmonary disease  History of tonsillectomy  History of appendectomy        Medication list         MEDICATIONS  (STANDING):  dexMEDEtomidine Infusion 0.3 MICROgram(s)/kG/Hr (4.08 mL/Hr) IV Continuous <Continuous>  enoxaparin Injectable 40 milliGRAM(s) SubCutaneous daily  hydrocortisone 2.5% Rectal Cream 1 Application(s) Rectal two times a day  hydrocortisone hemorrhoidal Suppository 1 Suppository(s) Rectal two times a day  influenza   Vaccine 0.5 milliLiter(s) IntraMuscular once  lactobacillus acidophilus 1 Tablet(s) Oral three times a day with meals  lidocaine 2% Gel 1 Application(s) Topical three times a day  mesalamine Suppository 1000 milliGRAM(s) Rectal at bedtime  methylPREDNISolone sodium succinate Injectable 60 milliGRAM(s) IV Push every 12 hours  morphine  - Injectable 1 milliGRAM(s) IV Push once  OXcarbazepine 300 milliGRAM(s) Oral two times a day  pantoprazole    Tablet 40 milliGRAM(s) Oral before breakfast  piperacillin/tazobactam IVPB.. 3.375 Gram(s) IV Intermittent every 8 hours  potassium phosphate / sodium phosphate powder 1 Packet(s) Oral three times a day  propranolol 20 milliGRAM(s) Oral three times a day  sucralfate 1 Gram(s) Oral two times a day  tiotropium 18 MICROgram(s) Capsule 1 Capsule(s) Inhalation daily  witch hazel Pads 1 Application(s) Topical three times a day  zinc oxide 20% Ointment 1 Application(s) Topical two times a day    MEDICATIONS  (PRN):  acetaminophen   Tablet .. 650 milliGRAM(s) Oral every 6 hours PRN Temp greater or equal to 38C (100.4F), Mild Pain (1 - 3)  ALBUTerol    0.083% 2.5 milliGRAM(s) Nebulizer every 6 hours PRN Shortness of Breath and/or Wheezing  ALPRAZolam 0.25 milliGRAM(s) Oral every 8 hours PRN anxiety  ondansetron    Tablet 4 milliGRAM(s) Oral every 12 hours PRN Nausea  simethicone 80 milliGRAM(s) Chew two times a day PRN Gas         Vitals log        ICU Vital Signs Last 24 Hrs  T(C): 36.7 (12 Jan 2020 04:12), Max: 37.6 (11 Jan 2020 16:00)  T(F): 98 (12 Jan 2020 04:12), Max: 99.7 (11 Jan 2020 16:00)  HR: 71 (12 Jan 2020 07:00) (69 - 133)  BP: 91/52 (12 Jan 2020 07:00) (72/47 - 172/87)  BP(mean): 66 (12 Jan 2020 07:00) (54 - 131)  ABP: --  ABP(mean): --  RR: 21 (12 Jan 2020 07:00) (20 - 59)  SpO2: 100% (12 Jan 2020 07:00) (77% - 100%)           Input and Output:  I&O's Detail    11 Jan 2020 07:01  -  12 Jan 2020 07:00  --------------------------------------------------------  IN:    dexmedetomidine Infusion: 87.4 mL    lactated ringers.: 640 mL    Oral Fluid: 170 mL    Solution: 500 mL    Solution: 200 mL  Total IN: 1597.4 mL    OUT:  Total OUT: 0 mL    Total NET: 1597.4 mL          Lab Data                        11.4   17.94 )-----------( 559      ( 12 Jan 2020 05:40 )             36.4     01-12    136  |  93<L>  |  8   ----------------------------<  137<H>  3.9   |  35<H>  |  0.40<L>    Ca    9.3      12 Jan 2020 05:40  Phos  3.6     01-12  Mg     2.1     01-12              Review of Systems	      Objective     Physical Examination    heart s1s2  lung dec BS  abd soft  head nc      Pertinent Lab findings & Imaging      Shauna:  NO   Adequate UO     I&O's Detail    11 Jan 2020 07:01  -  12 Jan 2020 07:00  --------------------------------------------------------  IN:    dexmedetomidine Infusion: 87.4 mL    lactated ringers.: 640 mL    Oral Fluid: 170 mL    Solution: 500 mL    Solution: 200 mL  Total IN: 1597.4 mL    OUT:  Total OUT: 0 mL    Total NET: 1597.4 mL               Discussed with:     Cultures:	        Radiology

## 2020-01-12 NOTE — PROGRESS NOTE ADULT - PROBLEM SELECTOR PLAN 1
most likely 2/2 hospital acquired pna (suspect gram negative rods) with hypoxia   - ID consulted (Ermias) recommending continue with Zosyn for now and that aspiration remains in differential  - suspect gram neg pna is primary driving force for resp failure and sepsis  - pt multifactorial shock today and has been on levophed throughout the day although. wean precedex as tolerated. added albumin. wean off pressors as BP tolerates

## 2020-01-12 NOTE — PROGRESS NOTE ADULT - ATTENDING COMMENTS
59F with Hx ILD (unknown etiology, no Bx), chronic hypoxemic respiratory failure, known chronic R PTX which has been conservatively managed, Hx NHL (s/p XRT, chemo and SCT), Hx suspected CVA with R sided weakness, Hx seizure, chronic SDH v hygromas admitted 12/26 with colitis/proctitis and treated with CTX and flagyl.  Hospital course complicated by development of HCAP, acute on chronic hypoxemic respiratory failure requiring high flow NC.  Overall unchanged today with high FiO2 requirements    --continue trileptal for sz disorder  repeat trileptal level pending  anxiety, continue precedex, given hallucinations will d/c xanax  morphine prn for pain from chest tube  --this am developed shock requiring low dose levophed  doubt tension PTX as CXR done this am showed unchanged PTX  may be from precedex and/or morphine  improved with albumin and fluid, now weaning off  hold propranolol while on levophed  --hypoxemic respiratory failure, multifactorial from HCAP, ILD, ?PTX   remains on high flow NC, wean as tolerates  suspect main contributor to hypoxemia is HCAP  continue duonebs, mucomyst, hypertonic for secretions clearance  --ILD, trial of solumedrol  --chronic R PTX, given progressively worsening respiratory status it is reasonable to attempt decompression of the R lung  pt agreeable last night  R pigtail placed by thoracic surgery this am at bedside  --tolerating PO diet though poor intake  --normal renal function  --HCAP, continue zosyn   --plan discussed in detail with  and daughter   --pt critically ill.  CC time 60min

## 2020-01-12 NOTE — PROGRESS NOTE ADULT - SUBJECTIVE AND OBJECTIVE BOX
INTERVAL HPI/OVERNIGHT EVENTS:    overnight events noted; chest tube placed this AM; on pressors   pt seen and examined  on high flow  still with poor po intake   BM x1 overnight, and one this AM as per RN   labs noted         MEDICATIONS  (STANDING):  dexMEDEtomidine Infusion 0.3 MICROgram(s)/kG/Hr (4.08 mL/Hr) IV Continuous <Continuous>  enoxaparin Injectable 40 milliGRAM(s) SubCutaneous daily  hydrocortisone 2.5% Rectal Cream 1 Application(s) Rectal two times a day  hydrocortisone hemorrhoidal Suppository 1 Suppository(s) Rectal two times a day  influenza   Vaccine 0.5 milliLiter(s) IntraMuscular once  lactated ringers. 1000 milliLiter(s) (80 mL/Hr) IV Continuous <Continuous>  lactobacillus acidophilus 1 Tablet(s) Oral three times a day with meals  lidocaine 2% Gel 1 Application(s) Topical three times a day  OXcarbazepine 300 milliGRAM(s) Oral two times a day  pantoprazole    Tablet 40 milliGRAM(s) Oral before breakfast  piperacillin/tazobactam IVPB.. 3.375 Gram(s) IV Intermittent every 8 hours  potassium phosphate / sodium phosphate powder 1 Packet(s) Oral three times a day  propranolol 20 milliGRAM(s) Oral three times a day  sodium chloride 3%  Inhalation 3 milliLiter(s) Inhalation every 6 hours  sucralfate 1 Gram(s) Oral two times a day  tiotropium 18 MICROgram(s) Capsule 1 Capsule(s) Inhalation daily  witch hazel Pads 1 Application(s) Topical three times a day  zinc oxide 20% Ointment 1 Application(s) Topical two times a day    MEDICATIONS  (PRN):  acetaminophen   Tablet .. 650 milliGRAM(s) Oral every 6 hours PRN Temp greater or equal to 38C (100.4F), Mild Pain (1 - 3)  ALBUTerol    0.083% 2.5 milliGRAM(s) Nebulizer every 6 hours PRN Shortness of Breath and/or Wheezing  ondansetron    Tablet 4 milliGRAM(s) Oral every 12 hours PRN Nausea  simethicone 80 milliGRAM(s) Chew two times a day PRN Gas      Allergies    IV Contrast (Anaphylaxis)  shellfish. (Anaphylaxis)    Intolerances        Review of Systems: unable to obtain due to AMS         Vital Signs Last 24 Hrs  T(C): 36.9 (10 Mauricio 2020 07:29), Max: 37.7 (10 Mauricio 2020 00:16)  T(F): 98.4 (10 Mauricio 2020 07:29), Max: 99.8 (10 Mauricio 2020 00:16)  HR: 88 (10 Mauricio 2020 08:00) (79 - 121)  BP: 135/80 (10 Mauricio 2020 08:00) (78/49 - 188/86)  BP(mean): 103 (10 Mauricio 2020 08:00) (58 - 124)  RR: 27 (10 Mauricio 2020 08:00) (24 - 67)  SpO2: 93% (10 Mauricio 2020 08:00) (86% - 100%)    PHYSICAL EXAM:  Constitutional: appears critically ill, lethargic  HEENT: ncat, on high flow  Neck: No LAD  Gastrointestinal: soft nt nd  Extremities: No peripheral edema  Neurological: lethargic       LABS:                        10.2   9.27  )-----------( 478      ( 10 Mauricio 2020 05:36 )             32.9     01-10    138  |  95<L>  |  9   ----------------------------<  100<H>  3.3<L>   |  37<H>  |  0.47<L>    Ca    8.9      10 Mauricio 2020 05:36  Phos  2.5     01-10  Mg     1.8     01-10    TPro  6.1  /  Alb  2.0<L>  /  TBili  0.2  /  DBili  <.10  /  AST  25  /  ALT  19  /  AlkPhos  81  01-09      Urinalysis Basic - ( 2020 13:41 )    Color: Yellow / Appearance: Slightly Turbid / S.015 / pH: x  Gluc: x / Ketone: Negative  / Bili: Negative / Urobili: Negative   Blood: x / Protein: 25 mg/dL / Nitrite: Negative   Leuk Esterase: Moderate / RBC: 6-10 /HPF / WBC 11-25   Sq Epi: x / Non Sq Epi: Few / Bacteria: Moderate        RADIOLOGY & ADDITIONAL TESTS:

## 2020-01-12 NOTE — CHART NOTE - NSCHARTNOTEFT_GEN_A_CORE
Assessment: Pt seen in ICU for malnutrition follow-up. As per chart pt is a 59 year old female with a PMH of interstitial lung disease hx of pneumothorax (on 2L NC at home), hx pulmonary nodules, Meniere's disease, Non-Hodgkin's lymphoma (SCD/XRT/chemo at [facility] 2003), hx of CVA (3/2019 - residual L sided weakness, unable to use her L arm), not on ASA or Plavix due to GIB, Seizure disorder, tachycardia, MVP, hx of chronic SDH, presents with weakness, weight loss, nausea, and diarrhea admitted for R pneumothorax now with new fever and worsening ? PTX. S/P (1/12) chest tube placement. Unable to interview pt at this time, as pt recently has chest tube placed and was agitated and moaning. Per chart pt continues with poor appetite and PO intake, consuming 0-25% of meals and noted to not be feeling well enough to eat at times. Continues with fecal incontinence, last BM 1/11.      Factors impacting intake:  [x ] other: persistent lack of appetite     Diet Presciption: Diet, Regular:   Lacto-Ovo Veg (Accepts Milk Prod., Eggs)  Supplement Feeding Modality:  Oral  Ensure Enlive Cans or Servings Per Day:  1       Frequency:  Two Times a day (01-09-20 @ 09:24)    Intake: poor     Current Weight: (1/12) 136lbs   Previous Weight: (1/8) 121.6lbs  % Weight Change-pt's weight noted to be fluctuating, continue to monitor pt's daily weights     Pertinent Medications: MEDICATIONS  (STANDING):  albumin human  5% IVPB 250 milliLiter(s) IV Intermittent every 6 hours  dexMEDEtomidine Infusion 0.3 MICROgram(s)/kG/Hr (4.08 mL/Hr) IV Continuous <Continuous>  enoxaparin Injectable 40 milliGRAM(s) SubCutaneous daily  hydrocortisone 2.5% Rectal Cream 1 Application(s) Rectal two times a day  hydrocortisone hemorrhoidal Suppository 1 Suppository(s) Rectal two times a day  influenza   Vaccine 0.5 milliLiter(s) IntraMuscular once  lactated ringers. 1000 milliLiter(s) (100 mL/Hr) IV Continuous <Continuous>  lactobacillus acidophilus 1 Tablet(s) Oral three times a day with meals  lidocaine 0.5% Injectable 5 milliLiter(s) Local Injection once  lidocaine 2% Gel 1 Application(s) Topical three times a day  mesalamine Suppository 1000 milliGRAM(s) Rectal at bedtime  methylPREDNISolone sodium succinate Injectable 60 milliGRAM(s) IV Push every 12 hours  morphine  - Injectable 1 milliGRAM(s) IV Push once  norepinephrine Infusion 0.05 MICROgram(s)/kG/Min (5.1 mL/Hr) IV Continuous <Continuous>  OXcarbazepine 300 milliGRAM(s) Oral two times a day  pantoprazole    Tablet 40 milliGRAM(s) Oral before breakfast  piperacillin/tazobactam IVPB.. 3.375 Gram(s) IV Intermittent every 8 hours  potassium phosphate / sodium phosphate powder 1 Packet(s) Oral three times a day  propranolol 20 milliGRAM(s) Oral three times a day  sucralfate 1 Gram(s) Oral two times a day  tiotropium 18 MICROgram(s) Capsule 1 Capsule(s) Inhalation daily  witch hazel Pads 1 Application(s) Topical three times a day  zinc oxide 20% Ointment 1 Application(s) Topical two times a day    MEDICATIONS  (PRN):  acetaminophen   Tablet .. 650 milliGRAM(s) Oral every 6 hours PRN Temp greater or equal to 38C (100.4F), Mild Pain (1 - 3)  ALBUTerol    0.083% 2.5 milliGRAM(s) Nebulizer every 6 hours PRN Shortness of Breath and/or Wheezing  ALPRAZolam 0.25 milliGRAM(s) Oral every 8 hours PRN anxiety  morphine  - Injectable 1 milliGRAM(s) IV Push every 4 hours PRN Severe Pain (7 - 10)  ondansetron    Tablet 4 milliGRAM(s) Oral every 12 hours PRN Nausea  simethicone 80 milliGRAM(s) Chew two times a day PRN Gas    Pertinent Labs: 01-12 Na136 mmol/L Glu 137 mg/dL<H> K+ 3.9 mmol/L Cr  0.40 mg/dL<L> BUN 8 mg/dL 01-12 Phos 3.6 mg/dL, Hgb 11.4, Chloride 93, 01-09 Alb 2.0 g/dL<L>     CAPILLARY BLOOD GLUCOSE    Skin: Stage 2 right medial sacrum, left gluteal     Estimated Needs:   [ x] no change since previous assessment  [ ] recalculated:     Previous Nutrition Diagnosis:    [ x] Malnutrition     Nutrition Diagnosis is [x ] ongoing-being addressed with oral nutritional supplements, honoring pt's food preferences     New Nutrition Diagnosis: [ x] not applicable       Interventions: Continue with Regular, Lacto-ovo diet   Recommend  [ ] Change Diet To:  [ x] Nutrition Supplement: Continue with Ensure Enlive, BID   [ ] Nutrition Support  [ x] Other:   1) Encourage adequate PO intake  2) Continue to honor pt's food preferences   3) Monitor pt's PO intake, weight, skin, edema, GI distress   4) RD to remain available     Monitoring and Evaluation:   [ x] PO intake [ x ] Tolerance to diet prescription [ x ] weights [ x ] labs[ x ] follow up per protocol  [ ] other:

## 2020-01-12 NOTE — PROGRESS NOTE ADULT - SUBJECTIVE AND OBJECTIVE BOX
Patient is a 59y old  Female who presents with a chief complaint of pneumothorax, colitis, UTI (11 Jan 2020 18:24)    HPI:  59F with PMH of interstitial lung disease hx of pneumothorax (on 2L NC at home), hx pulmonary nodules, Meniere's disease, Non-Hodgkin's lymphoma (SCD/XRT/chemo at [facility] 2003), hx of CVA (3/2019 - residual L sided weakness, unable to use her L arm), not on ASA or Plavix due to GIB, Seizure disorder, tachycardia, MVP, hx of chronic SDH, presented to ED 12/26 w/ complaints of nausea and diarrhea. Admitted to ICU for UTI and sterocolitis. Hospital course further complicated by Acute hypoxic respiratory failure, PTX, and severe sepsis secondary to superimposed PNA      Events in last 24 hours: Patient respiratory status continues to decline. FiO2 increaed to 80%. Patient received Morphine and Xanax earlier for increased anxiety and WOB    Allergies: IV Contrast  shellfish.    PAST MEDICAL & SURGICAL HISTORY:  Interstitial lung disease: on home o2 prn  NHL (non-Hodgkin's lymphoma): Agem 43 sp chemo/rt/stem cell  Transient cerebral ischemia, unspecified type  Mitral prolapse  History of tonsillectomy  History of appendectomy    FAMILY HISTORY:  Family history of stroke  Family history of breast cancer: Mother    SOCIAL HISTORY:    Home Medications:    Review of Systems:  Patient altered, unable to participate in full ROS    T(F): 98.4 (01-11-20 @ 20:30), Max: 99.7 (01-11-20 @ 16:00)  HR: 82 (01-12-20 @ 00:00) (69 - 133)  BP: 116/65 (01-12-20 @ 00:00) (72/47 - 172/87)  RR: 28 (01-12-20 @ 00:00) (24 - 59)  SpO2: 96% (01-12-20 @ 00:00)  Wt(kg): --    CAPILLARY BLOOD GLUCOSE        I&O's Summary    10 Mauricio 2020 07:01  -  11 Jan 2020 07:00  --------------------------------------------------------  IN: 2372.2 mL / OUT: 700 mL / NET: 1672.2 mL    11 Jan 2020 07:01  -  12 Jan 2020 00:23  --------------------------------------------------------  IN: 1597.4 mL / OUT: 0 mL / NET: 1597.4 mL        Physical Exam:     Gen: critically ill appearing, +High flow  Neuro: opens eyes, intermittently lethargic  CVS: Sinus tachycardia  Resp: Coarse breath sounds b/l, decreased breath sounds right base. accessory muscle use  Abd: soft, NT, nD  Ext: cool, dry, no edema    Meds:  piperacillin/tazobactam IVPB.. 3.375 Gram(s) IV Intermittent every 8 hours    propranolol 20 milliGRAM(s) Oral three times a day     methylPREDNISolone sodium succinate Injectable 60 milliGRAM(s) IV Push every 12 hours     ALBUTerol    0.083% 2.5 milliGRAM(s) Nebulizer every 6 hours PRN  tiotropium 18 MICROgram(s) Capsule 1 Capsule(s) Inhalation daily     acetaminophen   Tablet .. 650 milliGRAM(s) Oral every 6 hours PRN  ALPRAZolam 0.25 milliGRAM(s) Oral every 8 hours PRN  dexMEDEtomidine Infusion 0.3 MICROgram(s)/kG/Hr IV Continuous <Continuous>  morphine  - Injectable 1 milliGRAM(s) IV Push once  ondansetron    Tablet 4 milliGRAM(s) Oral every 12 hours PRN  OXcarbazepine 300 milliGRAM(s) Oral two times a day        enoxaparin Injectable 40 milliGRAM(s) SubCutaneous daily     mesalamine Suppository 1000 milliGRAM(s) Rectal at bedtime  pantoprazole    Tablet 40 milliGRAM(s) Oral before breakfast  simethicone 80 milliGRAM(s) Chew two times a day PRN  sucralfate 1 Gram(s) Oral two times a day        potassium phosphate / sodium phosphate powder 1 Packet(s) Oral three times a day     influenza   Vaccine 0.5 milliLiter(s) IntraMuscular once     hydrocortisone 2.5% Rectal Cream 1 Application(s) Rectal two times a day  hydrocortisone hemorrhoidal Suppository 1 Suppository(s) Rectal two times a day  lidocaine 2% Gel 1 Application(s) Topical three times a day  witch hazel Pads 1 Application(s) Topical three times a day  zinc oxide 20% Ointment 1 Application(s) Topical two times a day     lactobacillus acidophilus 1 Tablet(s) Oral three times a day with meals                           9.3    8.62  )-----------( 449      ( 11 Jan 2020 05:54 )             29.0       01-11    136  |  93<L>  |  8   ----------------------------<  163<H>  3.5   |  36<H>  |  0.54    Ca    8.4<L>      11 Jan 2020 15:10  Phos  2.9     01-11  Mg     2.2     01-11                .Blood Blood-Peripheral   No growth to date. -- 01-08 @ 00:16      Rapid RVP Result: NotDetec (01-08 @ 10:25)      Radiology:     EXAM:  US DPLX LWR EXT VEINS COMPL BI                            PROCEDURE DATE:  01/09/2020          INTERPRETATION:  CLINICAL INDICATION: Hypoxemia, pleuritic chest pain, assess DVT.    TECHNIQUE: Grayscale, color Doppler and spectral Doppler ultrasound was utilized to evaluate bilateral lower extremity deep venous system.      COMPARISON: 6/21/2019.    FINDINGS: There is no thrombosis in bilateral common femoral veins, femoral veins or popliteal veins. Visualized posterior tibial veins are patent.    IMPRESSION:     No deep vein thrombosis in either lower extremity.                HELDER EVERETT M.D., ATTENDING RADIOLOGIST  This document has been electronically signed. Jan 9 2020 10:38PM        Assessment/Plan:  59F with PMH of interstitial lung disease hx of pneumothorax (on 2L NC at home), hx pulmonary nodules, Meniere's disease, Non-Hodgkin's lymphoma (SCD/XRT/chemo at [facility] 2003), hx of CVA (3/2019 - residual L sided weakness, unable to use her L arm), not on ASA or Plavix due to GIB, Seizure disorder, tachycardia, MVP, hx of chronic SDH, presented to ED 12/26 w/ complaints of nausea and diarrhea. Admitted to ICU for UTI and sterocolitis. Hospital course further complicated by Acute hypoxic respiratory failure, PTX, and severe sepsis secondary to superimposed PNA    -Acute hypoxic respiratory failure. Continued increased WOB. Patient has refused multiple interventions in the past including steroids and chest tube placement for PTX. High risk of intubation  -Remains on High flow, FiO2 increased to 80%. Standing bronchodilators. Started on Solu-Medrol 60mg in attempt to improve respiratory status w/ underlying ILD  -Right PTX. Patient now agrees to have chest tube placed. CT surg recc open thoracotomy chest tube placement. Dr. Cowan contacted regarding placement and plan discussed. Placement non-emergent at this point and will evaluate in AM if agrees for placement  -Increased anxiety and WOB. Received Morphine and Xanax earlier with improvement. Will utilize on PRN basis for increased WOB.  -Zosyn for empiric PNA coverage. Cultures without growth to date  -DVT PPX: Lovenox    Multiple discussions had at bedside with Daughter (HCP), Dr. Orozco, and myself. They are aware of patients critical condition and refusal of treatment. Would like to keep patient full code at this point.      Critical care time spent (mins): 40 minutes including saray spent reviewing chart, ordering labs, discussing with interdisciplinary team. Not including time spent performing procedures

## 2020-01-13 LAB
ALBUMIN SERPL ELPH-MCNC: 2.3 G/DL — LOW (ref 3.3–5)
ALP SERPL-CCNC: 91 U/L — SIGNIFICANT CHANGE UP (ref 40–120)
ALT FLD-CCNC: 26 U/L — SIGNIFICANT CHANGE UP (ref 12–78)
ANION GAP SERPL CALC-SCNC: 4 MMOL/L — LOW (ref 5–17)
AST SERPL-CCNC: 27 U/L — SIGNIFICANT CHANGE UP (ref 15–37)
BASOPHILS # BLD AUTO: 0.01 K/UL — SIGNIFICANT CHANGE UP (ref 0–0.2)
BASOPHILS NFR BLD AUTO: 0.1 % — SIGNIFICANT CHANGE UP (ref 0–2)
BILIRUB DIRECT SERPL-MCNC: <.1 MG/DL — SIGNIFICANT CHANGE UP (ref 0.05–0.2)
BILIRUB INDIRECT FLD-MCNC: >0.1 MG/DL — LOW (ref 0.2–1)
BILIRUB SERPL-MCNC: 0.2 MG/DL — SIGNIFICANT CHANGE UP (ref 0.2–1.2)
BUN SERPL-MCNC: 9 MG/DL — SIGNIFICANT CHANGE UP (ref 7–23)
CALCIUM SERPL-MCNC: 8.7 MG/DL — SIGNIFICANT CHANGE UP (ref 8.5–10.1)
CHLORIDE SERPL-SCNC: 97 MMOL/L — SIGNIFICANT CHANGE UP (ref 96–108)
CO2 SERPL-SCNC: 37 MMOL/L — HIGH (ref 22–31)
CREAT SERPL-MCNC: 0.41 MG/DL — LOW (ref 0.5–1.3)
CULTURE RESULTS: SIGNIFICANT CHANGE UP
CULTURE RESULTS: SIGNIFICANT CHANGE UP
EOSINOPHIL # BLD AUTO: 0 K/UL — SIGNIFICANT CHANGE UP (ref 0–0.5)
EOSINOPHIL NFR BLD AUTO: 0 % — SIGNIFICANT CHANGE UP (ref 0–6)
GLUCOSE SERPL-MCNC: 165 MG/DL — HIGH (ref 70–99)
HCT VFR BLD CALC: 30.6 % — LOW (ref 34.5–45)
HGB BLD-MCNC: 9.6 G/DL — LOW (ref 11.5–15.5)
IMM GRANULOCYTES NFR BLD AUTO: 0.7 % — SIGNIFICANT CHANGE UP (ref 0–1.5)
LYMPHOCYTES # BLD AUTO: 0.65 K/UL — LOW (ref 1–3.3)
LYMPHOCYTES # BLD AUTO: 5.2 % — LOW (ref 13–44)
MAGNESIUM SERPL-MCNC: 2.1 MG/DL — SIGNIFICANT CHANGE UP (ref 1.6–2.6)
MCHC RBC-ENTMCNC: 28.6 PG — SIGNIFICANT CHANGE UP (ref 27–34)
MCHC RBC-ENTMCNC: 31.4 GM/DL — LOW (ref 32–36)
MCV RBC AUTO: 91.1 FL — SIGNIFICANT CHANGE UP (ref 80–100)
MONOCYTES # BLD AUTO: 0.6 K/UL — SIGNIFICANT CHANGE UP (ref 0–0.9)
MONOCYTES NFR BLD AUTO: 4.8 % — SIGNIFICANT CHANGE UP (ref 2–14)
NEUTROPHILS # BLD AUTO: 11.22 K/UL — HIGH (ref 1.8–7.4)
NEUTROPHILS NFR BLD AUTO: 89.2 % — HIGH (ref 43–77)
NRBC # BLD: 0 /100 WBCS — SIGNIFICANT CHANGE UP (ref 0–0)
PHOSPHATE SERPL-MCNC: 2.6 MG/DL — SIGNIFICANT CHANGE UP (ref 2.5–4.5)
PLATELET # BLD AUTO: 542 K/UL — HIGH (ref 150–400)
POTASSIUM SERPL-MCNC: 3.9 MMOL/L — SIGNIFICANT CHANGE UP (ref 3.5–5.3)
POTASSIUM SERPL-SCNC: 3.9 MMOL/L — SIGNIFICANT CHANGE UP (ref 3.5–5.3)
PROT SERPL-MCNC: 5.7 G/DL — LOW (ref 6–8.3)
RBC # BLD: 3.36 M/UL — LOW (ref 3.8–5.2)
RBC # FLD: 14.5 % — SIGNIFICANT CHANGE UP (ref 10.3–14.5)
SODIUM SERPL-SCNC: 138 MMOL/L — SIGNIFICANT CHANGE UP (ref 135–145)
SPECIMEN SOURCE: SIGNIFICANT CHANGE UP
SPECIMEN SOURCE: SIGNIFICANT CHANGE UP
WBC # BLD: 12.57 K/UL — HIGH (ref 3.8–10.5)
WBC # FLD AUTO: 12.57 K/UL — HIGH (ref 3.8–10.5)

## 2020-01-13 PROCEDURE — 99233 SBSQ HOSP IP/OBS HIGH 50: CPT | Mod: GC

## 2020-01-13 PROCEDURE — 71045 X-RAY EXAM CHEST 1 VIEW: CPT | Mod: 26

## 2020-01-13 PROCEDURE — 76604 US EXAM CHEST: CPT | Mod: 26

## 2020-01-13 PROCEDURE — 99233 SBSQ HOSP IP/OBS HIGH 50: CPT

## 2020-01-13 RX ORDER — MORPHINE SULFATE 50 MG/1
1 CAPSULE, EXTENDED RELEASE ORAL ONCE
Refills: 0 | Status: DISCONTINUED | OUTPATIENT
Start: 2020-01-13 | End: 2020-01-13

## 2020-01-13 RX ADMIN — TIOTROPIUM BROMIDE 1 CAPSULE(S): 18 CAPSULE ORAL; RESPIRATORY (INHALATION) at 12:38

## 2020-01-13 RX ADMIN — LIDOCAINE 1 APPLICATION(S): 4 CREAM TOPICAL at 13:42

## 2020-01-13 RX ADMIN — Medication 1 GRAM(S): at 17:20

## 2020-01-13 RX ADMIN — AER TRAVELER 1 APPLICATION(S): 0.5 SOLUTION RECTAL; TOPICAL at 05:22

## 2020-01-13 RX ADMIN — PIPERACILLIN AND TAZOBACTAM 25 GRAM(S): 4; .5 INJECTION, POWDER, LYOPHILIZED, FOR SOLUTION INTRAVENOUS at 05:21

## 2020-01-13 RX ADMIN — Medication 1 PACKET(S): at 05:21

## 2020-01-13 RX ADMIN — PIPERACILLIN AND TAZOBACTAM 25 GRAM(S): 4; .5 INJECTION, POWDER, LYOPHILIZED, FOR SOLUTION INTRAVENOUS at 22:04

## 2020-01-13 RX ADMIN — Medication 100 MILLIGRAM(S): at 12:26

## 2020-01-13 RX ADMIN — LIDOCAINE 1 APPLICATION(S): 4 CREAM TOPICAL at 23:00

## 2020-01-13 RX ADMIN — Medication 1000 MILLIGRAM(S): at 22:12

## 2020-01-13 RX ADMIN — MORPHINE SULFATE 1 MILLIGRAM(S): 50 CAPSULE, EXTENDED RELEASE ORAL at 01:48

## 2020-01-13 RX ADMIN — Medication 1 TABLET(S): at 09:26

## 2020-01-13 RX ADMIN — Medication 650 MILLIGRAM(S): at 08:50

## 2020-01-13 RX ADMIN — Medication 1 PACKET(S): at 14:00

## 2020-01-13 RX ADMIN — DEXMEDETOMIDINE HYDROCHLORIDE IN 0.9% SODIUM CHLORIDE 4.08 MICROGRAM(S)/KG/HR: 4 INJECTION INTRAVENOUS at 19:42

## 2020-01-13 RX ADMIN — AER TRAVELER 1 APPLICATION(S): 0.5 SOLUTION RECTAL; TOPICAL at 22:04

## 2020-01-13 RX ADMIN — OXCARBAZEPINE 300 MILLIGRAM(S): 300 TABLET, FILM COATED ORAL at 22:12

## 2020-01-13 RX ADMIN — OXCARBAZEPINE 300 MILLIGRAM(S): 300 TABLET, FILM COATED ORAL at 09:25

## 2020-01-13 RX ADMIN — ZINC OXIDE 1 APPLICATION(S): 200 OINTMENT TOPICAL at 17:18

## 2020-01-13 RX ADMIN — Medication 1 TABLET(S): at 12:27

## 2020-01-13 RX ADMIN — PANTOPRAZOLE SODIUM 40 MILLIGRAM(S): 20 TABLET, DELAYED RELEASE ORAL at 05:22

## 2020-01-13 RX ADMIN — Medication 1 APPLICATION(S): at 09:26

## 2020-01-13 RX ADMIN — MORPHINE SULFATE 1 MILLIGRAM(S): 50 CAPSULE, EXTENDED RELEASE ORAL at 02:03

## 2020-01-13 RX ADMIN — Medication 1 SUPPOSITORY(S): at 05:22

## 2020-01-13 RX ADMIN — Medication 125 MILLILITER(S): at 00:12

## 2020-01-13 RX ADMIN — Medication 60 MILLIGRAM(S): at 05:21

## 2020-01-13 RX ADMIN — ZINC OXIDE 1 APPLICATION(S): 200 OINTMENT TOPICAL at 05:23

## 2020-01-13 RX ADMIN — Medication 1 PACKET(S): at 22:03

## 2020-01-13 RX ADMIN — Medication 1 GRAM(S): at 05:22

## 2020-01-13 RX ADMIN — SODIUM CHLORIDE 100 MILLILITER(S): 9 INJECTION, SOLUTION INTRAVENOUS at 05:21

## 2020-01-13 RX ADMIN — Medication 125 MILLILITER(S): at 05:20

## 2020-01-13 RX ADMIN — Medication 1 TABLET(S): at 17:20

## 2020-01-13 RX ADMIN — ENOXAPARIN SODIUM 40 MILLIGRAM(S): 100 INJECTION SUBCUTANEOUS at 12:26

## 2020-01-13 RX ADMIN — LIDOCAINE 1 APPLICATION(S): 4 CREAM TOPICAL at 05:23

## 2020-01-13 RX ADMIN — Medication 1 APPLICATION(S): at 23:00

## 2020-01-13 RX ADMIN — PIPERACILLIN AND TAZOBACTAM 25 GRAM(S): 4; .5 INJECTION, POWDER, LYOPHILIZED, FOR SOLUTION INTRAVENOUS at 13:58

## 2020-01-13 NOTE — PROGRESS NOTE ADULT - SUBJECTIVE AND OBJECTIVE BOX
Date/Time Patient Seen:  		  Referring MD:   Data Reviewed	       Patient is a 59y old  Female who presents with a chief complaint of pneumothorax, colitis, UTI (13 Jan 2020 00:30)      Subjective/HPI     PAST MEDICAL & SURGICAL HISTORY:  Interstitial lung disease: on home o2 prn  NHL (non-Hodgkin's lymphoma): Agem 43 sp chemo/rt/stem cell  Transient cerebral ischemia, unspecified type  Mitral prolapse  Pulmonary disease  History of tonsillectomy  History of appendectomy        Medication list         MEDICATIONS  (STANDING):  dexMEDEtomidine Infusion 0.3 MICROgram(s)/kG/Hr (4.08 mL/Hr) IV Continuous <Continuous>  enoxaparin Injectable 40 milliGRAM(s) SubCutaneous daily  hydrocortisone 2.5% Rectal Cream 1 Application(s) Rectal two times a day  hydrocortisone hemorrhoidal Suppository 1 Suppository(s) Rectal two times a day  influenza   Vaccine 0.5 milliLiter(s) IntraMuscular once  lactated ringers. 1000 milliLiter(s) (100 mL/Hr) IV Continuous <Continuous>  lactobacillus acidophilus 1 Tablet(s) Oral three times a day with meals  lidocaine 2% Gel 1 Application(s) Topical three times a day  mesalamine Suppository 1000 milliGRAM(s) Rectal at bedtime  methylPREDNISolone sodium succinate Injectable 60 milliGRAM(s) IV Push every 12 hours  morphine  - Injectable 1 milliGRAM(s) IV Push once  norepinephrine Infusion 0.05 MICROgram(s)/kG/Min (5.1 mL/Hr) IV Continuous <Continuous>  OXcarbazepine 300 milliGRAM(s) Oral two times a day  pantoprazole    Tablet 40 milliGRAM(s) Oral before breakfast  piperacillin/tazobactam IVPB.. 3.375 Gram(s) IV Intermittent every 8 hours  potassium phosphate / sodium phosphate powder 1 Packet(s) Oral three times a day  sucralfate 1 Gram(s) Oral two times a day  tiotropium 18 MICROgram(s) Capsule 1 Capsule(s) Inhalation daily  witch hazel Pads 1 Application(s) Topical three times a day  zinc oxide 20% Ointment 1 Application(s) Topical two times a day    MEDICATIONS  (PRN):  acetaminophen   Tablet .. 650 milliGRAM(s) Oral every 6 hours PRN Temp greater or equal to 38C (100.4F), Mild Pain (1 - 3)  ALBUTerol    0.083% 2.5 milliGRAM(s) Nebulizer every 6 hours PRN Shortness of Breath and/or Wheezing  morphine  - Injectable 1 milliGRAM(s) IV Push every 4 hours PRN Severe Pain (7 - 10)  ondansetron    Tablet 4 milliGRAM(s) Oral every 12 hours PRN Nausea  simethicone 80 milliGRAM(s) Chew two times a day PRN Gas         Vitals log        ICU Vital Signs Last 24 Hrs  T(C): 36.4 (13 Jan 2020 03:46), Max: 36.5 (13 Jan 2020 00:21)  T(F): 97.6 (13 Jan 2020 03:46), Max: 97.7 (13 Jan 2020 00:21)  HR: 90 (13 Jan 2020 06:00) (55 - 90)  BP: 120/70 (13 Jan 2020 06:00) (62/38 - 146/86)  BP(mean): 90 (13 Jan 2020 06:00) (46 - 109)  ABP: --  ABP(mean): --  RR: 33 (13 Jan 2020 06:00) (21 - 39)  SpO2: 92% (13 Jan 2020 06:00) (83% - 100%)           Input and Output:  I&O's Detail    12 Jan 2020 07:01  -  13 Jan 2020 07:00  --------------------------------------------------------  IN:    dexmedetomidine Infusion: 123.3 mL    lactated ringers.: 2100 mL    norepinephrine Infusion: 66.3 mL    Solution: 200 mL    Solution: 875 mL  Total IN: 3364.6 mL    OUT:  Total OUT: 0 mL    Total NET: 3364.6 mL          Lab Data                        9.6    12.57 )-----------( 542      ( 13 Jan 2020 05:28 )             30.6     01-13    138  |  97  |  9   ----------------------------<  165<H>  3.9   |  37<H>  |  0.41<L>    Ca    8.7      13 Jan 2020 05:28  Phos  2.6     01-13  Mg     2.1     01-13    TPro  5.7<L>  /  Alb  2.3<L>  /  TBili  0.2  /  DBili  <.10  /  AST  27  /  ALT  26  /  AlkPhos  91  01-13            Review of Systems	      Objective     Physical Examination    heart s1s2  lung dec BS  abd soft      Pertinent Lab findings & Imaging      Shauna:  NO   Adequate UO     I&O's Detail    12 Jan 2020 07:01  -  13 Jan 2020 07:00  --------------------------------------------------------  IN:    dexmedetomidine Infusion: 123.3 mL    lactated ringers.: 2100 mL    norepinephrine Infusion: 66.3 mL    Solution: 200 mL    Solution: 875 mL  Total IN: 3364.6 mL    OUT:  Total OUT: 0 mL    Total NET: 3364.6 mL               Discussed with:     Cultures:	        Radiology

## 2020-01-13 NOTE — PROGRESS NOTE ADULT - ASSESSMENT
Assessment: 59F with PMH of interstitial lung disease hx of pneumothorax (on 2L NC at home), hx pulmonary nodules, Meniere's disease, Non-Hodgkin's lymphoma (SCD/XRT/chemo at [facility] 2003), hx of CVA (3/2019 - residual L sided weakness, unable to use her L arm), not on ASA or Plavix due to GIB, Seizure disorder, tachycardia, MVP, hx of chronic SDH, presented to ED 12/26 w/ complaints of nausea and diarrhea. Admitted to ICU for UTI and sterocolitis. Hospital course further complicated by Acute hypoxic respiratory failure, PTX, and severe sepsis secondary to superimposed PNA    Plan-    Neuro: Anxiety- Xanax discontinued due to hallucinations, will continue Precedex gtt for comfort. Seizure Disorder - continue trileptal    Cardio:   -Levophed gtt started for persistent hypotension. Likely multi-factorial (medication/poor PO intake) given low Albumin. Able to titrate Levophed off. Avoid aggressive fluid resuscitation given tenuous respiratory status. Albumin started due to low protein state.    Pulm: Acute hypoxic respiratory failure. Remains on High flow, FiO2 titrated down to 60% FiO2. Standing bronchodilators. Solu-Medrol in attempt to improve respiratory status w/ underlying ILD. High risk of intubation. -Right PTX. Chest tube placed by CT surgery with minimal improvement in PTX. Patient now agrees to have chest tube placed. Maintain chest tube to suction. Will evaluate with CR in AM  ID: -[id number] for empiric PNA coverage. Cultures without growth to date  GI:  Renal/lytes:  Endo:  Heme/Onc:  Skin:  Dispo: Assessment: 59F with PMH of interstitial lung disease hx of pneumothorax (on 2L NC at home), hx pulmonary nodules, Meniere's disease, Non-Hodgkin's lymphoma (SCD/XRT/chemo at [facility] 2003), hx of CVA (3/2019 - residual L sided weakness, unable to use her L arm), not on ASA or Plavix due to GIB, Seizure disorder, tachycardia, MVP, hx of chronic SDH, presented to ED 12/26 w/ complaints of nausea and diarrhea. Admitted to ICU for UTI and sterocolitis. Hospital course further complicated by Acute hypoxic respiratory failure, PTX, and severe sepsis secondary to superimposed PNA    Plan-    Neuro: Anxiety- Xanax discontinued due to hallucinations, will continue Precedex gtt for comfort. Seizure Disorder - continue trileptal  Cardio: On Levophed gtt for hypotension now improved s/p albumin. Likely secondary to morphine and poor oral intake given low albumin. continue to hold Propanolol while on levophed.  Pulm: Acute hypoxic respiratory failure. On High flow, FiO2 titrated up to 65% FiO2. Continue bronchodilators. Increase Solu-Medrol to 500mg bid for 3 days then will taper in attempt to improve respiratory status w/ underlying ILD. If pt does not respond to high dose steroids, palliative care route to be discussed with pt and family. High risk of intubation. -Right PTX. Chest tube placed by CT surgery with minimal improvement in PTX. Maintain chest tube to suction. Will wean off High flow as tolerated.  ID: [email or web address] for empiric PNA coverage. Cultures without growth to date. DC zosyn tomorrow 1/14  GI: Albumin started due to low protein state, continue PO diet, poor oral intake  Renal/lytes: normal renal function  Heme/Onc: DVT ppx with Lovenox  Dispo: Full code, plan discussed with  at bedside. Monitor overnight for hallucinations/delirium secondary to high dose steroids

## 2020-01-13 NOTE — PROGRESS NOTE ADULT - SUBJECTIVE AND OBJECTIVE BOX
Patient is a 59y old  Female who presents with a chief complaint of pneumothorax, colitis, UTI (12 Jan 2020 16:28)    HPI:  59F with PMH of interstitial lung disease hx of pneumothorax (on 2L NC at home), hx pulmonary nodules, Meniere's disease, Non-Hodgkin's lymphoma (SCD/XRT/chemo at [facility] 2003), hx of CVA (3/2019 - residual L sided weakness, unable to use her L arm), not on ASA or Plavix due to GIB, Seizure disorder, tachycardia, MVP, hx of chronic SDH, presented to ED 12/26 w/ complaints of nausea and diarrhea. Admitted to ICU for UTI and sterocolitis. Hospital course further complicated by Acute hypoxic respiratory failure, PTX, and severe sepsis secondary to superimposed PNA    Events in last 24 hours: Hypotensive this morning and started on Levophed gtt. To maintain adequate BP. Right chest tube placed by CT surgery with minimal improvement in PTX, Pt. remains in significant respiratory failure.    Allergies: IV Contrast  shellfish.    PAST MEDICAL & SURGICAL HISTORY:  Interstitial lung disease: on home o2 prn  NHL (non-Hodgkin's lymphoma): Agem 43 sp chemo/rt/stem cell  Transient cerebral ischemia, unspecified type  Mitral prolapse  History of tonsillectomy  History of appendectomy    FAMILY HISTORY:  Family history of stroke  Family history of breast cancer: Mother    SOCIAL HISTORY:    Home Medications:    Review of Systems:  Constitutional: no fever, chills, fatigue  Neuro: no headache, numbness, weakness  Resp: no cough, wheezing, shortness of breath  CVS: no chest pain, palpitations, leg swelling  GI: no abdominal pain, nausea, vomiting, diarrhea   : no dysuria, frequency, incontinence  Skin: no itching, burning, rashes, or lesions   Msk: no joint pain or swelling  Psych: no depression, anxiety, mood swings    T(F): 97.7 (01-13-20 @ 00:21), Max: 98 (01-12-20 @ 04:12)  HR: 86 (01-12-20 @ 22:00) (55 - 90)  BP: 117/59 (01-12-20 @ 22:00) (62/38 - 146/86)  RR: 33 (01-12-20 @ 22:00) (20 - 46)  SpO2: 91% (01-12-20 @ 22:00)  Wt(kg): --    CAPILLARY BLOOD GLUCOSE        I&O's Summary    11 Jan 2020 07:01  -  12 Jan 2020 07:00  --------------------------------------------------------  IN: 1597.4 mL / OUT: 0 mL / NET: 1597.4 mL    12 Jan 2020 07:01  -  13 Jan 2020 00:31  --------------------------------------------------------  IN: 2359.6 mL / OUT: 0 mL / NET: 2359.6 mL        Physical Exam:     Gen: critically ill appearing  Neuro: lethargic, episodes of intermittent confusion  CVS: sinus tachycardia  Resp: +accessory muscle use, coarse breath sounds b/l, +right chest tube  Abd: soft, NT, ND  Ext: cool, dry, no edema    Meds:  piperacillin/tazobactam IVPB.. 3.375 Gram(s) IV Intermittent every 8 hours    norepinephrine Infusion 0.05 MICROgram(s)/kG/Min IV Continuous <Continuous>     methylPREDNISolone sodium succinate Injectable 60 milliGRAM(s) IV Push every 12 hours     ALBUTerol    0.083% 2.5 milliGRAM(s) Nebulizer every 6 hours PRN  tiotropium 18 MICROgram(s) Capsule 1 Capsule(s) Inhalation daily     acetaminophen   Tablet .. 650 milliGRAM(s) Oral every 6 hours PRN  dexMEDEtomidine Infusion 0.3 MICROgram(s)/kG/Hr IV Continuous <Continuous>  morphine  - Injectable 1 milliGRAM(s) IV Push every 4 hours PRN  morphine  - Injectable 1 milliGRAM(s) IV Push once  ondansetron    Tablet 4 milliGRAM(s) Oral every 12 hours PRN  OXcarbazepine 300 milliGRAM(s) Oral two times a day        enoxaparin Injectable 40 milliGRAM(s) SubCutaneous daily     mesalamine Suppository 1000 milliGRAM(s) Rectal at bedtime  pantoprazole    Tablet 40 milliGRAM(s) Oral before breakfast  simethicone 80 milliGRAM(s) Chew two times a day PRN  sucralfate 1 Gram(s) Oral two times a day        albumin human  5% IVPB 250 milliLiter(s) IV Intermittent every 6 hours  lactated ringers. 1000 milliLiter(s) IV Continuous <Continuous>  potassium phosphate / sodium phosphate powder 1 Packet(s) Oral three times a day     influenza   Vaccine 0.5 milliLiter(s) IntraMuscular once     hydrocortisone 2.5% Rectal Cream 1 Application(s) Rectal two times a day  hydrocortisone hemorrhoidal Suppository 1 Suppository(s) Rectal two times a day  lidocaine 2% Gel 1 Application(s) Topical three times a day  witch hazel Pads 1 Application(s) Topical three times a day  zinc oxide 20% Ointment 1 Application(s) Topical two times a day     lactobacillus acidophilus 1 Tablet(s) Oral three times a day with meals                           11.4   17.94 )-----------( 559      ( 12 Jan 2020 05:40 )             36.4       01-12    136  |  93<L>  |  8   ----------------------------<  137<H>  3.9   |  35<H>  |  0.40<L>    Ca    9.3      12 Jan 2020 05:40  Phos  3.6     01-12  Mg     2.1     01-12                    Rapid RVP Result: NotDetec (01-08 @ 10:25)      Radiology:     EXAM:  XR CHEST PORTABLE URGENT 1V                            PROCEDURE DATE:  01/12/2020          INTERPRETATION:  Chest one view    HISTORY: Chest tube placement    COMPARISON STUDY: Earlier the same day    Frontal expiratory view of the chest shows the heart to be similar in size. Chest tube projects over the lower right ribs. The lungs show similar bilateral infiltrates with similar right apical pneumothorax.    IMPRESSION:  Chest tube placement. Similar right pneumothorax.    Thank you forthe courtesy of this referral.                ERLIN ALMEIDA M.D., ATTENDING RADIOLOGIST  This document has been electronically signed. Jan 12 2020  3:40PM                Assessment/Plan:  59F with PMH of interstitial lung disease hx of pneumothorax (on 2L NC at home), hx pulmonary nodules, Meniere's disease, Non-Hodgkin's lymphoma (SCD/XRT/chemo at [facility] 2003), hx of CVA (3/2019 - residual L sided weakness, unable to use her L arm), not on ASA or Plavix due to GIB, Seizure disorder, tachycardia, MVP, hx of chronic SDH, presented to ED 12/26 w/ complaints of nausea and diarrhea. Admitted to ICU for UTI and sterocolitis. Hospital course further complicated by Acute hypoxic respiratory failure, PTX, and severe sepsis secondary to superimposed PNA    -Acute hypoxic respiratory failure. Remains on High flow, FiO2 titrated down to 60% FiO2. Standing bronchodilators. Solu-Medrol in attempt to improve respiratory status w/ underlying ILD. High risk of intubation  -Right PTX. Chest tube placed by CT surgery with minimal improvement in PTX. Patient now agrees to have chest tube placed. Maintain chest tube to suction. Will evaluate with CR in AM  -Levophed gtt started for persistent hypotension. Likely multi-factorial (medication/poor PO intake) given low Albumin. Able to titrate Levophed off. Avoid aggressive fluid resuscitation given tenuous respiratory status. Albumin started due to low protein state.  -Increased anxiety and WOB. Xanax discontinued as leading to hallucinations. Maintain Precedex gtt for comfort. Will utilize on PRN Morphine for increased WOB.  -Zosyn for empiric PNA coverage. Cultures without growth to date  -DVT PPX: Lovenox    Multiple discussions had at bedside with Daughter (HCP), Dr. Orozco, and myself. They are aware of patients critical condition and refusal of treatment. Would like to keep patient full code at this point.    Critical care time spent (mins): 37 minutes including saray spent reviewing chart, ordering labs, discussing with interdisciplinary team. Not including time spent performing procedures

## 2020-01-13 NOTE — PROGRESS NOTE ADULT - ATTENDING COMMENTS
59F with Hx ILD (unknown etiology, no Bx), chronic hypoxemic respiratory failure, known chronic R PTX which has been conservatively managed, Hx NHL (s/p XRT, chemo and SCT), Hx suspected CVA with R sided weakness, Hx seizure, chronic SDH v hygromas admitted 12/26 with colitis/proctitis and treated with CTX and flagyl.  Hospital course complicated by development of HCAP, acute on chronic hypoxemic respiratory failure requiring high flow NC.  Overall unchanged today with high FiO2 requirements    Neuro: continue trileptal for sz disorder (level 12/27, repeat level pending); continue precedex for anxiety, xanax dc 2/2 hallucinations though may be 2/2 steroid induced delirum; consider seroquel at nighted for anxiety control if uptrending precedex requirements; continue morphine prn for pain from chest tube  CV: continue levophed for shock state, low suspicion 2/2 tension PTX as CXR done this am showed unchanged PTX and no mediastinal shift; likely 2/2 sedation, continue to wean as tolerated; hold propranolol while on levophed  Pulm: hypoxemic respiratory failure, multifactorial from HCAP, ILD, less likely from PTX as remains unchanged on high flow despite CT placement yesterday; suspect main contributor to hypoxemia is HCAP with underlying ILD, to cmplete abx course tomorrow, will give pulse dose steroids today x3 days then start prednisone; continue duonebs, mucomyst, hypertonic for secretions clearance; encourage incentive jesus but pt refusing  GI: tolerating PO diet though poor intake; continue mesalamine and witch hazel for colitits  Renal: no acute issues  ID: to complete 7 day course of zosyn tomorrow

## 2020-01-13 NOTE — PROGRESS NOTE ADULT - PROBLEM SELECTOR PLAN 1
most likely 2/2 hospital acquired pna (suspect gram negative rods) with hypoxia   - ID consulted (Ermias) recommending continue with Zosyn for now and that aspiration remains in differential  - suspect gram neg pna is primary driving force for resp failure and sepsis  - pt multifactorial shock today and has been on levophed throughout the day although. wean precedex as tolerated. added albumin. wean off pressors as BP tolerates most likely 2/2 hospital acquired pna (suspect gram negative rods) with hypoxia   - ID consulted (Ermias). last day of zosyn is tomorrow.   - suspect gram neg pna is primary driving force for resp failure and sepsis  - pt multifactorial shock today and has been on levophed throughout the day although. wean precedex as tolerated. added albumin. wean off pressors as BP tolerates

## 2020-01-13 NOTE — PROGRESS NOTE ADULT - SUBJECTIVE AND OBJECTIVE BOX
INTERVAL HPI/OVERNIGHT EVENTS:  pt seen and examined,  present  on hi flow  lethargic but easily arousable  c/o nausea  denies vomiting/diarrhea/abd pain  ate 1/2 a cheese sandwich last  night per   no acute gi events overnight per nursing, on pressors    MEDICATIONS  (STANDING):  dexMEDEtomidine Infusion 0.3 MICROgram(s)/kG/Hr (4.08 mL/Hr) IV Continuous <Continuous>  enoxaparin Injectable 40 milliGRAM(s) SubCutaneous daily  hydrocortisone 2.5% Rectal Cream 1 Application(s) Rectal two times a day  hydrocortisone hemorrhoidal Suppository 1 Suppository(s) Rectal two times a day  influenza   Vaccine 0.5 milliLiter(s) IntraMuscular once  lactated ringers. 1000 milliLiter(s) (100 mL/Hr) IV Continuous <Continuous>  lactobacillus acidophilus 1 Tablet(s) Oral three times a day with meals  lidocaine 2% Gel 1 Application(s) Topical three times a day  mesalamine Suppository 1000 milliGRAM(s) Rectal at bedtime  methylPREDNISolone sodium succinate Injectable 60 milliGRAM(s) IV Push every 12 hours  morphine  - Injectable 1 milliGRAM(s) IV Push once  norepinephrine Infusion 0.05 MICROgram(s)/kG/Min (5.1 mL/Hr) IV Continuous <Continuous>  OXcarbazepine 300 milliGRAM(s) Oral two times a day  pantoprazole    Tablet 40 milliGRAM(s) Oral before breakfast  piperacillin/tazobactam IVPB.. 3.375 Gram(s) IV Intermittent every 8 hours  potassium phosphate / sodium phosphate powder 1 Packet(s) Oral three times a day  sucralfate 1 Gram(s) Oral two times a day  tiotropium 18 MICROgram(s) Capsule 1 Capsule(s) Inhalation daily  witch hazel Pads 1 Application(s) Topical three times a day  zinc oxide 20% Ointment 1 Application(s) Topical two times a day    MEDICATIONS  (PRN):  acetaminophen   Tablet .. 650 milliGRAM(s) Oral every 6 hours PRN Temp greater or equal to 38C (100.4F), Mild Pain (1 - 3)  ALBUTerol    0.083% 2.5 milliGRAM(s) Nebulizer every 6 hours PRN Shortness of Breath and/or Wheezing  morphine  - Injectable 1 milliGRAM(s) IV Push every 4 hours PRN Severe Pain (7 - 10)  ondansetron    Tablet 4 milliGRAM(s) Oral every 12 hours PRN Nausea  simethicone 80 milliGRAM(s) Chew two times a day PRN Gas      Allergies    IV Contrast (Anaphylaxis)  shellfish. (Anaphylaxis)    Intolerances        Review of Systems:    General:  No wt loss, fevers, chills, night sweats, fatigue   Eyes:  Good vision, no reported pain  ENT:  No sore throat, pain, runny nose, dysphagia  CV:  No pain, palpitations, hypo/hypertension  Resp:  No dyspnea, cough, tachypnea, wheezing  GI:  No pain, +nausea, No vomiting, No diarrhea, No constipation, No weight loss, No fever, No pruritis, No rectal bleeding, No melena, No dysphagia  :  No pain, bleeding, incontinence, nocturia  Muscle:  No pain, weakness  Neuro:  No weakness, tingling, memory problems  Psych:  No fatigue, insomnia, mood problems, depression  Endocrine:  No polyuria, polydypsia, cold/heat intolerance  Heme:  No petechiae, ecchymosis, easy bruisability  Skin:  No rash, tattoos, scars, edema      Vital Signs Last 24 Hrs  T(C): 36.4 (13 Jan 2020 03:46), Max: 36.5 (13 Jan 2020 00:21)  T(F): 97.6 (13 Jan 2020 03:46), Max: 97.7 (13 Jan 2020 00:21)  HR: 86 (13 Jan 2020 09:00) (55 - 90)  BP: 120/68 (13 Jan 2020 09:00) (81/52 - 133/80)  BP(mean): 88 (13 Jan 2020 09:00) (62 - 98)  RR: 25 (13 Jan 2020 09:00) (21 - 39)  SpO2: 94% (13 Jan 2020 09:00) (83% - 100%)    PHYSICAL EXAM:    Constitutional: lying in bed on hi flow  HEENT: ncat  Neck: No LAD  Gastrointestinal: soft nt nd  Extremities: No peripheral edema  Neurological: lethargic but easily arousable and appropriate       LABS:                        9.6    12.57 )-----------( 542      ( 13 Jan 2020 05:28 )             30.6     01-13    138  |  97  |  9   ----------------------------<  165<H>  3.9   |  37<H>  |  0.41<L>    Ca    8.7      13 Jan 2020 05:28  Phos  2.6     01-13  Mg     2.1     01-13    TPro  5.7<L>  /  Alb  2.3<L>  /  TBili  0.2  /  DBili  <.10  /  AST  27  /  ALT  26  /  AlkPhos  91  01-13          RADIOLOGY & ADDITIONAL TESTS:

## 2020-01-13 NOTE — PROGRESS NOTE ADULT - SUBJECTIVE AND OBJECTIVE BOX
Neurology follow up note    ROMIE [id number]      Interval History:    Patient seen with spouse less sob feels that breathing better     MEDICATIONS    acetaminophen   Tablet .. 650 milliGRAM(s) Oral every 6 hours PRN  ALBUTerol    0.083% 2.5 milliGRAM(s) Nebulizer every 6 hours PRN  dexMEDEtomidine Infusion 0.3 MICROgram(s)/kG/Hr IV Continuous <Continuous>  enoxaparin Injectable 40 milliGRAM(s) SubCutaneous daily  hydrocortisone 2.5% Rectal Cream 1 Application(s) Rectal two times a day  hydrocortisone hemorrhoidal Suppository 1 Suppository(s) Rectal two times a day  influenza   Vaccine 0.5 milliLiter(s) IntraMuscular once  lactated ringers. 1000 milliLiter(s) IV Continuous <Continuous>  lactobacillus acidophilus 1 Tablet(s) Oral three times a day with meals  lidocaine 2% Gel 1 Application(s) Topical three times a day  mesalamine Suppository 1000 milliGRAM(s) Rectal at bedtime  methylPREDNISolone sodium succinate Injectable 60 milliGRAM(s) IV Push every 12 hours  morphine  - Injectable 1 milliGRAM(s) IV Push every 4 hours PRN  morphine  - Injectable 1 milliGRAM(s) IV Push once  norepinephrine Infusion 0.05 MICROgram(s)/kG/Min IV Continuous <Continuous>  ondansetron    Tablet 4 milliGRAM(s) Oral every 12 hours PRN  OXcarbazepine 300 milliGRAM(s) Oral two times a day  pantoprazole    Tablet 40 milliGRAM(s) Oral before breakfast  piperacillin/tazobactam IVPB.. 3.375 Gram(s) IV Intermittent every 8 hours  potassium phosphate / sodium phosphate powder 1 Packet(s) Oral three times a day  simethicone 80 milliGRAM(s) Chew two times a day PRN  sucralfate 1 Gram(s) Oral two times a day  tiotropium 18 MICROgram(s) Capsule 1 Capsule(s) Inhalation daily  witch hazel Pads 1 Application(s) Topical three times a day  zinc oxide 20% Ointment 1 Application(s) Topical two times a day      Allergies    IV Contrast (Anaphylaxis)  shellfish. (Anaphylaxis)    Intolerances            Vital Signs Last 24 Hrs  T(C): 36.4 (13 Jan 2020 03:46), Max: 36.5 (13 Jan 2020 00:21)  T(F): 97.6 (13 Jan 2020 03:46), Max: 97.7 (13 Jan 2020 00:21)  HR: 86 (13 Jan 2020 09:00) (55 - 90)  BP: 120/68 (13 Jan 2020 09:00) (81/52 - 146/86)  BP(mean): 88 (13 Jan 2020 09:00) (62 - 109)  RR: 25 (13 Jan 2020 09:00) (21 - 39)  SpO2: 94% (13 Jan 2020 09:00) (83% - 100%)    REVIEW OF SYSTEMS:  Constitutional:  The patient denies fever, chills, or night sweats.  Head:  Occ headaches.  Eyes:  No double vision or blurry vision.  Ears:  No ringing in the ears.  Neck:  No neck pain.  Respiratory:  Occasional cough with shortness of breath.  Cardiovascular:  no chest pain.  Abdomen:  occ nausea,  no vomiting, or abdominal pain.  Extremities/Neurological:  No numbness or tingling.  Musculoskeletal:  Occasional joint pain.    PHYSICAL EXAMINATION:   HEENT:  Head:  Normocephalic, atraumatic.  Eyes:  No scleral icterus.  Ears:  Hearing bilaterally appeared to be intact.  NECK:  Supple.  RESPIRATORY:  Decreased breath sounds bilaterally, but most prominent on the right.  CARDIOVASCULAR:  S1 and S2 heard.  ABDOMEN:  Soft and nontender.  Extremities:  No clubbing or cyanosis were noted.      NEUROLOGIC:  The patient is awake and alert.    Extraocular movements were intact.  Pupils were equal, round, and reactive bilaterally 3 mm to 2 mm.  Speech was fluent.  Smile was symmetric.  Motor:  Right upper was 5/5, left upper  arm brace was 3/5 decrease rom hand and finger in flexed position , right lower was 4/5, left lower was 3+/5.  As per my conversation with the spouse, after her apparent event back in March, questionable seizure versus questionable stroke.  She was left with left-sided weakness.  Sensory:  Bilateral upper and lower appeared intact to light touch.                     LABS:  CBC Full  -  ( 13 Jan 2020 05:28 )  WBC Count : 12.57 K/uL  RBC Count : 3.36 M/uL  Hemoglobin : 9.6 g/dL  Hematocrit : 30.6 %  Platelet Count - Automated : 542 K/uL  Mean Cell Volume : 91.1 fl  Mean Cell Hemoglobin : 28.6 pg  Mean Cell Hemoglobin Concentration : 31.4 gm/dL  Auto Neutrophil # : 11.22 K/uL  Auto Lymphocyte # : 0.65 K/uL  Auto Monocyte # : 0.60 K/uL  Auto Eosinophil # : 0.00 K/uL  Auto Basophil # : 0.01 K/uL  Auto Neutrophil % : 89.2 %  Auto Lymphocyte % : 5.2 %  Auto Monocyte % : 4.8 %  Auto Eosinophil % : 0.0 %  Auto Basophil % : 0.1 %      01-13    138  |  97  |  9   ----------------------------<  165<H>  3.9   |  37<H>  |  0.41<L>    Ca    8.7      13 Jan 2020 05:28  Phos  2.6     01-13  Mg     2.1     01-13    TPro  5.7<L>  /  Alb  2.3<L>  /  TBili  0.2  /  DBili  <.10  /  AST  27  /  ALT  26  /  AlkPhos  91  01-13    Hemoglobin A1C:     LIVER FUNCTIONS - ( 13 Jan 2020 05:28 )  Alb: 2.3 g/dL / Pro: 5.7 g/dL / ALK PHOS: 91 U/L / ALT: 26 U/L / AST: 27 U/L / GGT: x           Vitamin B12         RADIOLOGY    ANALYSIS AND PLAN:  This is a 59-year-old with a history of possibly epilepsy verse TIAs, history of chronic subdural hydromas and change in mental status.    1.	For episode of change in mental status, as per my conversation with the spouse, these appear to occur primarily at the same time at night for the last three to four weeks.  The patient has been on Trileptal for about eight to nine months.  Suspect less likely this is Trileptal, Questionable, the patient could have any type of sleep-related disorder causing these events to occur at night.  I spoke with the spouse, the patient should undergo sleep studies.  2.	For history of chronic subdural hematoma, these appeared to have resolved from previous MRI.  3.	For history of possible underlying epilepsy, for now, I will continue the patient on her Trileptal..  4.	Monitor CO2 levels as needed and respiratory status   5.	spoke outside neurologist Dr. Wallace in past agrees to continue trileptal for now  His telephone number is [phone].  6.	Spouse's name is Marialuisa, his telephone number is [phone] spoke to him at bedside 1/13/2020  7.	steroids as needed   8.	R pigtail placed by thoracic surgery   9.	no new events   10.	Greater than 25 minutes of time was spent with the patient, plan of care, reviewing data, speaking to the family and multidisciplinary healthcare team

## 2020-01-13 NOTE — PROGRESS NOTE ADULT - PROBLEM SELECTOR PLAN 1
ct showing stercoral colitis c/b rectal pain in setting of hemorrhoids  overall improved  monitoring off bowel regimen   cont lido and hydrocortisone cream   cont anusol supp bid, witch hazel pads prn  cont canasa suppository  monitor exam/gi fxn  diet as tolerated  up to date w colonoscopy, 4/2019 showed only non bleeding internal hemorrhoids; given persistence of symptoms, offered repeat c-scope but pt defers

## 2020-01-13 NOTE — PROGRESS NOTE ADULT - PROBLEM SELECTOR PLAN 2
moderate right pneumothorax, chronic advanced ILD, chronically O2 dependent  - episodes of tachypnea/dyspnea warranted discussion with CT surgery regarding placement of CT.    - CT placed today by thoracic surgery for hopeful improvement in O2 requirement and to improve increased work of breathing  - steroids started 60q12h  - pt is not alert enough to consent at this juncture and family has consented to therapy to this point  - overall prognosis is very poor moderate right pneumothorax, chronic advanced ILD, chronically O2 dependent  - episodes of tachypnea/dyspnea warranted discussion with CT surgery regarding placement of CT.    - CT placed by thoracic surgery. O2 requirements have somewhat improved  - stress dose steroids 500mg q12 x3d  - pt is not alert enough to consent at this juncture and family has consented to therapy to this point  - overall prognosis is poor

## 2020-01-13 NOTE — PROGRESS NOTE ADULT - SUBJECTIVE AND OBJECTIVE BOX
Patient is a 59y old  Female who presents with a chief complaint of pneumothorax, colitis, UTI (13 Jan 2020 09:42)    24 hour events: Pt seen and examined at bedside this am, states she is feeling better than the past few days, reports breathing has improved.  s/p R chest tube placement yesterday. On high flow titrated up to 65% FiO2.     REVIEW OF SYSTEMS  Constitutional: No fever, chills, fatigue  Neuro: No headache, numbness, weakness  Resp:admits to SOB and cough  CVS: denies CP, palpitations, leg swelling  GI: No abdominal pain, nausea, vomiting, diarrhea   : No dysuria, frequency, incontinence  Skin: No itching, burning, rashes, or lesions   Msk: No joint pain or swelling  Heme: No bleeding    T(F): 97.6 (01-13-20 @ 03:46), Max: 97.7 (01-13-20 @ 00:21)  HR: 89 (01-13-20 @ 15:00) (68 - 97)  BP: 150/79 (01-13-20 @ 15:00) (83/50 - 150/79)  RR: 34 (01-13-20 @ 15:00) (22 - 41)  SpO2: 95% (01-13-20 @ 15:00) (83% - 99%)  Wt(kg): --            I&O's Summary    01-12 @ 07:01 - 01-13 @ 07:00  --------------------------------------------------------  IN: 3364.6 mL / OUT: 0 mL / NET: 3364.6 mL    01-13 @ 07:01  -  01-13 @ 15:36  --------------------------------------------------------  IN: 760.8 mL / OUT: 120 mL / NET: 640.8 mL      PHYSICAL EXAM  General: frail and cachectic ill appearing female in NAD  CNS: somnolent, responds to verbal stimuli and appropriately verbalizing at this time  Resp: coarse crackles diffuse b/l lungs, +R chest tube  CVS: tachycardic, regular, no murmur  Abd: soft, NT, ND  Ext: No peripheral edema, cyanosis  Skin: warm and well perfused    MEDICATIONS  piperacillin/tazobactam IVPB.. IV Intermittent    norepinephrine Infusion IV Continuous    methylPREDNISolone sodium succinate IVPB IV Intermittent    ALBUTerol    0.083% Nebulizer PRN  tiotropium 18 MICROgram(s) Capsule Inhalation    acetaminophen   Tablet .. Oral PRN  dexMEDEtomidine Infusion IV Continuous  morphine  - Injectable IV Push PRN  morphine  - Injectable IV Push  ondansetron    Tablet Oral PRN  OXcarbazepine Oral      enoxaparin Injectable SubCutaneous    mesalamine Suppository Rectal  pantoprazole    Tablet Oral  simethicone Chew PRN  sucralfate Oral      lactated ringers. IV Continuous  potassium phosphate / sodium phosphate powder Oral    influenza   Vaccine IntraMuscular    hydrocortisone 2.5% Rectal Cream Rectal  hydrocortisone hemorrhoidal Suppository Rectal  lidocaine 2% Gel Topical  witch hazel Pads Topical  zinc oxide 20% Ointment Topical    lactobacillus acidophilus Oral                          9.6    12.57 )-----------( 542      ( 13 Jan 2020 05:28 )             30.6       01-13    138  |  97  |  9   ----------------------------<  165<H>  3.9   |  37<H>  |  0.41<L>    Ca    8.7      13 Jan 2020 05:28  Phos  2.6     01-13  Mg     2.1     01-13    TPro  5.7<L>  /  Alb  2.3<L>  /  TBili  0.2  /  DBili  <.10  /  AST  27  /  ALT  26  /  AlkPhos  91  01-13                      CENTRAL LINE: N  LUKE: N  A-LINE: N    GLOBAL ISSUE/BEST PRACTICE:  Analgesia: Y  Sedation:  Y  HOB elevation: yes  Stress ulcer prophylaxis: Y  VTE prophylaxis: Y  Glycemic control: Y  Nutrition: Y    CODE STATUS: FULL

## 2020-01-13 NOTE — PROGRESS NOTE ADULT - PROBLEM SELECTOR PLAN 1
on steroids now - IV  s/p CT - residual PTX noted - serial CXR noted -   wean o2 support as tolerated - remains on High flow -   I jesus  monitor vs and HD and Sat  on emp ABX to tx poss LRTI  on bronchodilators  discussed case and prognosis with family / ICU team and pt's outside Pulm Doc - Dr. Bettye Hazel.

## 2020-01-13 NOTE — PROGRESS NOTE ADULT - SUBJECTIVE AND OBJECTIVE BOX
infectious diseases progress note:    ROMIE VIRAMONTES is a 59y y. o. Female patient    Patient reports: no new issues    ROS:    EYES:  Negative  blurry vision or double vision  GASTROINTESTINAL:  Negative for nausea, vomiting, diarrhea  -otherwise negative except for subjective    Allergies    IV Contrast (Anaphylaxis)  shellfish. (Anaphylaxis)    Intolerances        ANTIBIOTICS/RELEVANT:  antimicrobials  piperacillin/tazobactam IVPB.. 3.375 Gram(s) IV Intermittent every 8 hours    immunologic:  influenza   Vaccine 0.5 milliLiter(s) IntraMuscular once    OTHER:  acetaminophen   Tablet .. 650 milliGRAM(s) Oral every 6 hours PRN  ALBUTerol    0.083% 2.5 milliGRAM(s) Nebulizer every 6 hours PRN  dexMEDEtomidine Infusion 0.3 MICROgram(s)/kG/Hr IV Continuous <Continuous>  enoxaparin Injectable 40 milliGRAM(s) SubCutaneous daily  hydrocortisone 2.5% Rectal Cream 1 Application(s) Rectal two times a day  hydrocortisone hemorrhoidal Suppository 1 Suppository(s) Rectal two times a day  lactated ringers. 1000 milliLiter(s) IV Continuous <Continuous>  lactobacillus acidophilus 1 Tablet(s) Oral three times a day with meals  lidocaine 2% Gel 1 Application(s) Topical three times a day  mesalamine Suppository 1000 milliGRAM(s) Rectal at bedtime  methylPREDNISolone sodium succinate Injectable 60 milliGRAM(s) IV Push every 12 hours  morphine  - Injectable 1 milliGRAM(s) IV Push every 4 hours PRN  morphine  - Injectable 1 milliGRAM(s) IV Push once  norepinephrine Infusion 0.05 MICROgram(s)/kG/Min IV Continuous <Continuous>  ondansetron    Tablet 4 milliGRAM(s) Oral every 12 hours PRN  OXcarbazepine 300 milliGRAM(s) Oral two times a day  pantoprazole    Tablet 40 milliGRAM(s) Oral before breakfast  potassium phosphate / sodium phosphate powder 1 Packet(s) Oral three times a day  simethicone 80 milliGRAM(s) Chew two times a day PRN  sucralfate 1 Gram(s) Oral two times a day  tiotropium 18 MICROgram(s) Capsule 1 Capsule(s) Inhalation daily  witch hazel Pads 1 Application(s) Topical three times a day  zinc oxide 20% Ointment 1 Application(s) Topical two times a day      Objective:  Last 24-Vital Signs Last 24 Hrs  T(C): 36.4 (13 Jan 2020 03:46), Max: 36.5 (13 Jan 2020 00:21)  T(F): 97.6 (13 Jan 2020 03:46), Max: 97.7 (13 Jan 2020 00:21)  HR: 90 (13 Jan 2020 06:00) (55 - 90)  BP: 120/70 (13 Jan 2020 06:00) (81/52 - 146/86)  BP(mean): 90 (13 Jan 2020 06:00) (62 - 109)  RR: 33 (13 Jan 2020 06:00) (21 - 39)  SpO2: 92% (13 Jan 2020 06:00) (83% - 100%)    T(C): 36.4 (01-13-20 @ 03:46), Max: 37.6 (01-11-20 @ 16:00)  T(F): 97.6 (01-13-20 @ 03:46), Max: 99.7 (01-11-20 @ 16:00)  T(C): 36.4 (01-13-20 @ 03:46), Max: 37.6 (01-11-20 @ 16:00)  T(F): 97.6 (01-13-20 @ 03:46), Max: 99.7 (01-11-20 @ 16:00)  T(C): 36.4 (01-13-20 @ 03:46), Max: 37.7 (01-10-20 @ 00:16)  T(F): 97.6 (01-13-20 @ 03:46), Max: 99.8 (01-10-20 @ 00:16)    PHYSICAL EXAM:  Constitutional: Well-developed, well nourished  Eyes: PERRLA, EOMI  Ear/Nose/Throat: oropharynx normal	  Neck: no JVD, no lymphadenopathy, supple  Respiratory: no accessory muscle use, lung fields with anterior scattered crackles left greater than right improved  Cardiovascular: RRR, normal S1, S2 no m/r/g  Gastrointestinal: soft, NT, no HSM, BS-normal  Extremities: no clubbing, no cyanosis, edema absent  Neuro: patient alert, oriented and appropriate  Skin: no sig lesions      LABS:                        9.6    12.57 )-----------( 542      ( 13 Jan 2020 05:28 )             30.6       WBC 12.57  01-13 @ 05:28  WBC 17.94  01-12 @ 05:40  WBC 8.62  01-11 @ 05:54  WBC 9.27  01-10 @ 05:36  WBC 9.16  01-09 @ 05:38  WBC 11.07  01-08 @ 06:39      01-13    138  |  97  |  9   ----------------------------<  165<H>  3.9   |  37<H>  |  0.41<L>    Ca    8.7      13 Jan 2020 05:28  Phos  2.6     01-13  Mg     2.1     01-13    TPro  5.7<L>  /  Alb  2.3<L>  /  TBili  0.2  /  DBili  <.10  /  AST  27  /  ALT  26  /  AlkPhos  91  01-13      Creatinine, Serum: 0.41 mg/dL (01-13-20 @ 05:28)  Creatinine, Serum: 0.40 mg/dL (01-12-20 @ 05:40)  Creatinine, Serum: 0.54 mg/dL (01-11-20 @ 15:10)  Creatinine, Serum: 0.35 mg/dL (01-11-20 @ 05:54)  Creatinine, Serum: 0.47 mg/dL (01-10-20 @ 05:36)  Creatinine, Serum: 0.63 mg/dL (01-09-20 @ 05:38)  Creatinine, Serum: 0.49 mg/dL (01-08-20 @ 06:39)                MICROBIOLOGY:              RADIOLOGY & ADDITIONAL STUDIES:

## 2020-01-14 DIAGNOSIS — J96.01 ACUTE RESPIRATORY FAILURE WITH HYPOXIA: ICD-10-CM

## 2020-01-14 LAB
ALBUMIN SERPL ELPH-MCNC: 2.4 G/DL — LOW (ref 3.3–5)
ALP SERPL-CCNC: 87 U/L — SIGNIFICANT CHANGE UP (ref 40–120)
ALT FLD-CCNC: 24 U/L — SIGNIFICANT CHANGE UP (ref 12–78)
ANION GAP SERPL CALC-SCNC: 6 MMOL/L — SIGNIFICANT CHANGE UP (ref 5–17)
AST SERPL-CCNC: 20 U/L — SIGNIFICANT CHANGE UP (ref 15–37)
BASOPHILS # BLD AUTO: 0.01 K/UL — SIGNIFICANT CHANGE UP (ref 0–0.2)
BASOPHILS NFR BLD AUTO: 0.1 % — SIGNIFICANT CHANGE UP (ref 0–2)
BILIRUB SERPL-MCNC: 0.2 MG/DL — SIGNIFICANT CHANGE UP (ref 0.2–1.2)
BUN SERPL-MCNC: 6 MG/DL — LOW (ref 7–23)
CALCIUM SERPL-MCNC: 9.3 MG/DL — SIGNIFICANT CHANGE UP (ref 8.5–10.1)
CHLORIDE SERPL-SCNC: 97 MMOL/L — SIGNIFICANT CHANGE UP (ref 96–108)
CO2 SERPL-SCNC: 39 MMOL/L — HIGH (ref 22–31)
CREAT SERPL-MCNC: 0.38 MG/DL — LOW (ref 0.5–1.3)
EOSINOPHIL # BLD AUTO: 0 K/UL — SIGNIFICANT CHANGE UP (ref 0–0.5)
EOSINOPHIL NFR BLD AUTO: 0 % — SIGNIFICANT CHANGE UP (ref 0–6)
GLUCOSE SERPL-MCNC: 144 MG/DL — HIGH (ref 70–99)
HCT VFR BLD CALC: 35.2 % — SIGNIFICANT CHANGE UP (ref 34.5–45)
HGB BLD-MCNC: 10.8 G/DL — LOW (ref 11.5–15.5)
IMM GRANULOCYTES NFR BLD AUTO: 0.8 % — SIGNIFICANT CHANGE UP (ref 0–1.5)
LYMPHOCYTES # BLD AUTO: 0.57 K/UL — LOW (ref 1–3.3)
LYMPHOCYTES # BLD AUTO: 5.9 % — LOW (ref 13–44)
MAGNESIUM SERPL-MCNC: 2.1 MG/DL — SIGNIFICANT CHANGE UP (ref 1.6–2.6)
MCHC RBC-ENTMCNC: 28.1 PG — SIGNIFICANT CHANGE UP (ref 27–34)
MCHC RBC-ENTMCNC: 30.7 GM/DL — LOW (ref 32–36)
MCV RBC AUTO: 91.7 FL — SIGNIFICANT CHANGE UP (ref 80–100)
MONOCYTES # BLD AUTO: 0.51 K/UL — SIGNIFICANT CHANGE UP (ref 0–0.9)
MONOCYTES NFR BLD AUTO: 5.3 % — SIGNIFICANT CHANGE UP (ref 2–14)
NEUTROPHILS # BLD AUTO: 8.42 K/UL — HIGH (ref 1.8–7.4)
NEUTROPHILS NFR BLD AUTO: 87.9 % — HIGH (ref 43–77)
NRBC # BLD: 0 /100 WBCS — SIGNIFICANT CHANGE UP (ref 0–0)
OXCARBAZEPINE SERPL-MCNC: 20 UG/ML — SIGNIFICANT CHANGE UP (ref 10–35)
PHOSPHATE SERPL-MCNC: 2.4 MG/DL — LOW (ref 2.5–4.5)
PLATELET # BLD AUTO: 607 K/UL — HIGH (ref 150–400)
POTASSIUM SERPL-MCNC: 3.9 MMOL/L — SIGNIFICANT CHANGE UP (ref 3.5–5.3)
POTASSIUM SERPL-SCNC: 3.9 MMOL/L — SIGNIFICANT CHANGE UP (ref 3.5–5.3)
PROT SERPL-MCNC: 6 G/DL — SIGNIFICANT CHANGE UP (ref 6–8.3)
RBC # BLD: 3.84 M/UL — SIGNIFICANT CHANGE UP (ref 3.8–5.2)
RBC # FLD: 14.3 % — SIGNIFICANT CHANGE UP (ref 10.3–14.5)
SODIUM SERPL-SCNC: 142 MMOL/L — SIGNIFICANT CHANGE UP (ref 135–145)
WBC # BLD: 9.59 K/UL — SIGNIFICANT CHANGE UP (ref 3.8–10.5)
WBC # FLD AUTO: 9.59 K/UL — SIGNIFICANT CHANGE UP (ref 3.8–10.5)

## 2020-01-14 PROCEDURE — 99232 SBSQ HOSP IP/OBS MODERATE 35: CPT

## 2020-01-14 PROCEDURE — 99233 SBSQ HOSP IP/OBS HIGH 50: CPT

## 2020-01-14 PROCEDURE — 71045 X-RAY EXAM CHEST 1 VIEW: CPT | Mod: 26

## 2020-01-14 PROCEDURE — 99233 SBSQ HOSP IP/OBS HIGH 50: CPT | Mod: GC

## 2020-01-14 RX ORDER — DEXTROSE 50 % IN WATER 50 %
25 SYRINGE (ML) INTRAVENOUS ONCE
Refills: 0 | Status: DISCONTINUED | OUTPATIENT
Start: 2020-01-14 | End: 2020-01-17

## 2020-01-14 RX ORDER — FLUCONAZOLE 150 MG/1
100 TABLET ORAL DAILY
Refills: 0 | Status: DISCONTINUED | OUTPATIENT
Start: 2020-01-14 | End: 2020-01-16

## 2020-01-14 RX ORDER — DEXTROSE 50 % IN WATER 50 %
12.5 SYRINGE (ML) INTRAVENOUS ONCE
Refills: 0 | Status: DISCONTINUED | OUTPATIENT
Start: 2020-01-14 | End: 2020-01-17

## 2020-01-14 RX ORDER — INSULIN LISPRO 100/ML
VIAL (ML) SUBCUTANEOUS
Refills: 0 | Status: DISCONTINUED | OUTPATIENT
Start: 2020-01-14 | End: 2020-01-17

## 2020-01-14 RX ORDER — FLUCONAZOLE 150 MG/1
100 TABLET ORAL DAILY
Refills: 0 | Status: DISCONTINUED | OUTPATIENT
Start: 2020-01-14 | End: 2020-01-14

## 2020-01-14 RX ORDER — DEXTROSE 50 % IN WATER 50 %
15 SYRINGE (ML) INTRAVENOUS ONCE
Refills: 0 | Status: DISCONTINUED | OUTPATIENT
Start: 2020-01-14 | End: 2020-01-17

## 2020-01-14 RX ORDER — GLUCAGON INJECTION, SOLUTION 0.5 MG/.1ML
1 INJECTION, SOLUTION SUBCUTANEOUS ONCE
Refills: 0 | Status: DISCONTINUED | OUTPATIENT
Start: 2020-01-14 | End: 2020-01-17

## 2020-01-14 RX ORDER — INSULIN LISPRO 100/ML
VIAL (ML) SUBCUTANEOUS AT BEDTIME
Refills: 0 | Status: DISCONTINUED | OUTPATIENT
Start: 2020-01-14 | End: 2020-01-17

## 2020-01-14 RX ORDER — SODIUM CHLORIDE 9 MG/ML
1000 INJECTION, SOLUTION INTRAVENOUS
Refills: 0 | Status: DISCONTINUED | OUTPATIENT
Start: 2020-01-14 | End: 2020-01-17

## 2020-01-14 RX ADMIN — OXCARBAZEPINE 300 MILLIGRAM(S): 300 TABLET, FILM COATED ORAL at 10:22

## 2020-01-14 RX ADMIN — SODIUM CHLORIDE 100 MILLILITER(S): 9 INJECTION, SOLUTION INTRAVENOUS at 05:02

## 2020-01-14 RX ADMIN — SODIUM CHLORIDE 100 MILLILITER(S): 9 INJECTION, SOLUTION INTRAVENOUS at 00:35

## 2020-01-14 RX ADMIN — PANTOPRAZOLE SODIUM 40 MILLIGRAM(S): 20 TABLET, DELAYED RELEASE ORAL at 05:06

## 2020-01-14 RX ADMIN — Medication 100 MILLIGRAM(S): at 12:27

## 2020-01-14 RX ADMIN — Medication 1 TABLET(S): at 12:24

## 2020-01-14 RX ADMIN — DEXMEDETOMIDINE HYDROCHLORIDE IN 0.9% SODIUM CHLORIDE 4.08 MICROGRAM(S)/KG/HR: 4 INJECTION INTRAVENOUS at 01:36

## 2020-01-14 RX ADMIN — Medication 1 SUPPOSITORY(S): at 05:08

## 2020-01-14 RX ADMIN — DEXMEDETOMIDINE HYDROCHLORIDE IN 0.9% SODIUM CHLORIDE 4.08 MICROGRAM(S)/KG/HR: 4 INJECTION INTRAVENOUS at 23:34

## 2020-01-14 RX ADMIN — Medication 1 PACKET(S): at 05:06

## 2020-01-14 RX ADMIN — DEXMEDETOMIDINE HYDROCHLORIDE IN 0.9% SODIUM CHLORIDE 4.08 MICROGRAM(S)/KG/HR: 4 INJECTION INTRAVENOUS at 19:50

## 2020-01-14 RX ADMIN — ZINC OXIDE 1 APPLICATION(S): 200 OINTMENT TOPICAL at 05:03

## 2020-01-14 RX ADMIN — OXCARBAZEPINE 300 MILLIGRAM(S): 300 TABLET, FILM COATED ORAL at 22:11

## 2020-01-14 RX ADMIN — ZINC OXIDE 1 APPLICATION(S): 200 OINTMENT TOPICAL at 17:50

## 2020-01-14 RX ADMIN — Medication 1 APPLICATION(S): at 12:21

## 2020-01-14 RX ADMIN — AER TRAVELER 1 APPLICATION(S): 0.5 SOLUTION RECTAL; TOPICAL at 05:06

## 2020-01-14 RX ADMIN — DEXMEDETOMIDINE HYDROCHLORIDE IN 0.9% SODIUM CHLORIDE 4.08 MICROGRAM(S)/KG/HR: 4 INJECTION INTRAVENOUS at 12:23

## 2020-01-14 RX ADMIN — Medication 1 PACKET(S): at 13:23

## 2020-01-14 RX ADMIN — TIOTROPIUM BROMIDE 1 CAPSULE(S): 18 CAPSULE ORAL; RESPIRATORY (INHALATION) at 12:28

## 2020-01-14 RX ADMIN — ENOXAPARIN SODIUM 40 MILLIGRAM(S): 100 INJECTION SUBCUTANEOUS at 12:28

## 2020-01-14 RX ADMIN — LIDOCAINE 1 APPLICATION(S): 4 CREAM TOPICAL at 05:04

## 2020-01-14 RX ADMIN — Medication 1 TABLET(S): at 18:24

## 2020-01-14 RX ADMIN — Medication 650 MILLIGRAM(S): at 11:29

## 2020-01-14 RX ADMIN — Medication 100 MILLIGRAM(S): at 00:33

## 2020-01-14 RX ADMIN — Medication 1 GRAM(S): at 05:06

## 2020-01-14 RX ADMIN — Medication 650 MILLIGRAM(S): at 10:32

## 2020-01-14 RX ADMIN — PIPERACILLIN AND TAZOBACTAM 25 GRAM(S): 4; .5 INJECTION, POWDER, LYOPHILIZED, FOR SOLUTION INTRAVENOUS at 05:05

## 2020-01-14 RX ADMIN — DEXMEDETOMIDINE HYDROCHLORIDE IN 0.9% SODIUM CHLORIDE 4.08 MICROGRAM(S)/KG/HR: 4 INJECTION INTRAVENOUS at 07:45

## 2020-01-14 RX ADMIN — Medication 1 TABLET(S): at 10:22

## 2020-01-14 NOTE — PROGRESS NOTE ADULT - SUBJECTIVE AND OBJECTIVE BOX
infectious diseases progress note:    ROMIE VIRAMONTES is a 59y y. o. Female patient    Patient reports: feeling better    ROS:    EYES:  Negative  blurry vision or double vision  GASTROINTESTINAL:  Negative for nausea, vomiting, diarrhea  -otherwise negative except for subjective    Allergies    IV Contrast (Anaphylaxis)  shellfish. (Anaphylaxis)    Intolerances        ANTIBIOTICS/RELEVANT:  antimicrobials  piperacillin/tazobactam IVPB.. 3.375 Gram(s) IV Intermittent every 8 hours    immunologic:  influenza   Vaccine 0.5 milliLiter(s) IntraMuscular once    OTHER:  acetaminophen   Tablet .. 650 milliGRAM(s) Oral every 6 hours PRN  ALBUTerol    0.083% 2.5 milliGRAM(s) Nebulizer every 6 hours PRN  dexMEDEtomidine Infusion 0.3 MICROgram(s)/kG/Hr IV Continuous <Continuous>  enoxaparin Injectable 40 milliGRAM(s) SubCutaneous daily  hydrocortisone 2.5% Rectal Cream 1 Application(s) Rectal two times a day  hydrocortisone hemorrhoidal Suppository 1 Suppository(s) Rectal two times a day  lactated ringers. 1000 milliLiter(s) IV Continuous <Continuous>  lactobacillus acidophilus 1 Tablet(s) Oral three times a day with meals  lidocaine 2% Gel 1 Application(s) Topical three times a day  mesalamine Suppository 1000 milliGRAM(s) Rectal at bedtime  methylPREDNISolone sodium succinate IVPB 500 milliGRAM(s) IV Intermittent every 12 hours  morphine  - Injectable 1 milliGRAM(s) IV Push every 4 hours PRN  morphine  - Injectable 1 milliGRAM(s) IV Push once  norepinephrine Infusion 0.05 MICROgram(s)/kG/Min IV Continuous <Continuous>  ondansetron    Tablet 4 milliGRAM(s) Oral every 12 hours PRN  OXcarbazepine 300 milliGRAM(s) Oral two times a day  pantoprazole    Tablet 40 milliGRAM(s) Oral before breakfast  potassium phosphate / sodium phosphate powder 1 Packet(s) Oral three times a day  simethicone 80 milliGRAM(s) Chew two times a day PRN  sucralfate 1 Gram(s) Oral two times a day  tiotropium 18 MICROgram(s) Capsule 1 Capsule(s) Inhalation daily  witch hazel Pads 1 Application(s) Topical three times a day  zinc oxide 20% Ointment 1 Application(s) Topical two times a day      Objective:  Last 24-Vital Signs Last 24 Hrs  T(C): 36.8 (14 Jan 2020 08:04), Max: 37.1 (13 Jan 2020 23:40)  T(F): 98.2 (14 Jan 2020 08:04), Max: 98.8 (13 Jan 2020 23:40)  HR: 99 (14 Jan 2020 08:12) (82 - 120)  BP: 158/81 (14 Jan 2020 07:00) (102/59 - 179/92)  BP(mean): 112 (14 Jan 2020 07:00) (74 - 155)  RR: 30 (14 Jan 2020 08:12) (22 - 45)  SpO2: 97% (14 Jan 2020 08:12) (86% - 100%)    T(C): 36.8 (01-14-20 @ 08:04), Max: 37.1 (01-13-20 @ 23:40)  T(F): 98.2 (01-14-20 @ 08:04), Max: 98.8 (01-13-20 @ 23:40)  T(C): 36.8 (01-14-20 @ 08:04), Max: 37.6 (01-11-20 @ 16:00)  T(F): 98.2 (01-14-20 @ 08:04), Max: 99.7 (01-11-20 @ 16:00)  T(C): 36.8 (01-14-20 @ 08:04), Max: 37.6 (01-11-20 @ 16:00)  T(F): 98.2 (01-14-20 @ 08:04), Max: 99.7 (01-11-20 @ 16:00)    PHYSICAL EXAM:  Constitutional: Well-developed, well nourished  Eyes: PERRLA, EOMI  Ear/Nose/Throat: oropharynx normal-still on high flow oxygen	  Neck: no JVD, no lymphadenopathy, supple  Respiratory: no accessory muscle use, lung fields bilaterally with rare crackles  Cardiovascular: RRR, normal S1, S2 no m/r/g  Gastrointestinal: soft, NT, no HSM, BS-normal  Extremities: no clubbing, no cyanosis, edema absent  Neuro: patient alert, oriented and appropriate  Skin: no sig lesions      LABS:                        10.8   9.59  )-----------( 607      ( 14 Jan 2020 05:41 )             35.2       WBC 9.59  01-14 @ 05:41  WBC 12.57  01-13 @ 05:28  WBC 17.94  01-12 @ 05:40  WBC 8.62  01-11 @ 05:54  WBC 9.27  01-10 @ 05:36  WBC 9.16  01-09 @ 05:38  WBC 11.07  01-08 @ 06:39      01-14    142  |  97  |  6<L>  ----------------------------<  144<H>  3.9   |  39<H>  |  0.38<L>    Ca    9.3      14 Jan 2020 05:41  Phos  2.4     01-14  Mg     2.1     01-14    TPro  6.0  /  Alb  2.4<L>  /  TBili  0.2  /  DBili  x   /  AST  20  /  ALT  24  /  AlkPhos  87  01-14      Creatinine, Serum: 0.38 mg/dL (01-14-20 @ 05:41)  Creatinine, Serum: 0.41 mg/dL (01-13-20 @ 05:28)  Creatinine, Serum: 0.40 mg/dL (01-12-20 @ 05:40)  Creatinine, Serum: 0.54 mg/dL (01-11-20 @ 15:10)  Creatinine, Serum: 0.35 mg/dL (01-11-20 @ 05:54)  Creatinine, Serum: 0.47 mg/dL (01-10-20 @ 05:36)  Creatinine, Serum: 0.63 mg/dL (01-09-20 @ 05:38)  Creatinine, Serum: 0.49 mg/dL (01-08-20 @ 06:39)                MICROBIOLOGY:              RADIOLOGY & ADDITIONAL STUDIES:

## 2020-01-14 NOTE — PROGRESS NOTE ADULT - SUBJECTIVE AND OBJECTIVE BOX
Patient is a 59y old  Female who presents with a chief complaint of pneumothorax, colitis, UTI (14 Jan 2020 11:54)    24 hour events: Pt with increased delirium overnight, Precedex was increased. High Flow increased to FiO2 70% overnight. Pt seen and examined at bedside this am. Pt states her breathing is "alright". Pt is speaking in complete sentences today compared to 1-2 word phrases yesterday. Decrease in cough noted. Decreased WOB noted. Pt denies CP, chest discomfort, abd pain, N/V.  Pt screaming "we are all going to die today" this am, delirium improved as day progressed.     REVIEW OF SYSTEMS  Constitutional: No fever, chills  Neuro: No headache, continued L arm weakness from prior CVA  Resp: No cough, wheezing, improved SOB  CVS: No chest pain, palpitations, admits to ankle swelling   GI: No abdominal pain, nausea, vomiting   : No dysuria, frequency, incontinence  Skin: No itching, burning, rashes, or lesions   Psych: admits to delirium and is saying "we are all going to die today"  Heme: No bleeding    T(F): 98.2 (01-14-20 @ 08:04), Max: 98.8 (01-13-20 @ 23:40)  HR: 99 (01-14-20 @ 08:12) (82 - 120)  BP: 158/81 (01-14-20 @ 07:00) (102/59 - 179/92)  RR: 30 (01-14-20 @ 08:12) (22 - 45)  SpO2: 97% (01-14-20 @ 08:12) (86% - 100%)  Wt(kg): --            I&O's Summary    01-13 @ 07:01  -  01-14 @ 07:00  --------------------------------------------------------  IN: 3133.2 mL / OUT: 1870 mL / NET: 1263.2 mL      PHYSICAL EXAM  General: frail and cachectic ill appearing female in NAD  CNS: awake, alert, L sided weakness residueal  Resp: coarse crackles diffuse b/l lungs, +R chest tube (minimal drainage)  CVS: regular, no murmur  Abd: soft, NT, ND  Ext: trace ankle edema, no cyanosis  Skin: warm and well perfused  Neuro: delirium noted     MEDICATIONS    norepinephrine Infusion IV Continuous    methylPREDNISolone sodium succinate IVPB IV Intermittent    ALBUTerol    0.083% Nebulizer PRN  tiotropium 18 MICROgram(s) Capsule Inhalation    acetaminophen   Tablet .. Oral PRN  dexMEDEtomidine Infusion IV Continuous  morphine  - Injectable IV Push PRN  morphine  - Injectable IV Push  ondansetron    Tablet Oral PRN  OXcarbazepine Oral      enoxaparin Injectable SubCutaneous    mesalamine Suppository Rectal  pantoprazole    Tablet Oral  simethicone Chew PRN  sucralfate Oral      lactated ringers. IV Continuous  potassium phosphate / sodium phosphate powder Oral    influenza   Vaccine IntraMuscular    hydrocortisone 2.5% Rectal Cream Rectal  hydrocortisone hemorrhoidal Suppository Rectal  lidocaine 2% Gel Topical  witch hazel Pads Topical  zinc oxide 20% Ointment Topical    lactobacillus acidophilus Oral                          10.8   9.59  )-----------( 607      ( 14 Jan 2020 05:41 )             35.2       01-14    142  |  97  |  6<L>  ----------------------------<  144<H>  3.9   |  39<H>  |  0.38<L>    Ca    9.3      14 Jan 2020 05:41  Phos  2.4     01-14  Mg     2.1     01-14    TPro  6.0  /  Alb  2.4<L>  /  TBili  0.2  /  DBili  x   /  AST  20  /  ALT  24  /  AlkPhos  87  01-14                    CENTRAL LINE: N  LUKE: N  A-LINE: N    GLOBAL ISSUE/BEST PRACTICE:  Analgesia: Y  Sedation:  Y  HOB elevation: yes  Stress ulcer prophylaxis: Y  VTE prophylaxis: Y  Glycemic control: Y  Nutrition: Y    CODE STATUS: FULL

## 2020-01-14 NOTE — PROGRESS NOTE ADULT - ATTENDING COMMENTS
59F h/o ILD (unknown etiology, no Bx), chronic hypoxemic respiratory failure, known chronic R PTX which has been conservatively managed, remote NHL (s/p XRT, chemo and SCT), previous CVA with R sided weakness, seizure do on trileptal, chronic SDH v hygromas admitted 12/26 with colitis/proctitis and treated with CTX and flagyl.  Hospital course complicated by development of HCAP, acute on chronic hypoxemic respiratory failure requiring high flow NC.    Neuro: delirium likely steroid induced, improved with precedex and redirection; continue trileptal for sz disorder; continue morphine prn for pain from chest tube  CV: shock state resolved, now titrated off levophed; can restart propranolol   Pulm: hypoxemic respiratory failure, multifactorial from HCAP, ILD, less likely from PTX as remains unchanged on high flow despite CT placement Day 2/3 pulse dose steroids today; continue duonebs, mucomyst, hypertonic for secretions clearance; encourage incentive jesus but pt refusing; family reporting gradual decline in functional status since October 2019, likely large component of current state is acute on chronic untreated ILD, explained in detail to family that uncertain return of fuctional status that she had prior to this decline as may be related to un-reversible fibrosis; CT chest/ab/pel ordered to further assess current site placement of pigtail, currently unable to go due to high flow requirements, wean as tolerated   GI: tolerating PO diet though poor intake; continue mesalamine and witch hazel for colitits  Renal: no acute issues  ID: zosyn course completed today; started fluconazole for oral thrush  Dispo:

## 2020-01-14 NOTE — PROGRESS NOTE ADULT - SUBJECTIVE AND OBJECTIVE BOX
Patient is a 59y old  Female who presents with a chief complaint of pneumothorax, colitis, UTI (14 Jan 2020 13:01)      FROM ADMISSION H+P:   HPI:  59F with PMH of interstitial lung disease hx of pneumothorax (on 2L NC at home), hx pulmonary nodules, Meniere's disease, Non-Hodgkin's lymphoma (SCD/XRT/chemo at [facility] 2003), hx of CVA (3/2019 - residual L sided weakness, unable to use her L arm), not on ASA or Plavix due to GIB, Seizure disorder, tachycardia, MVP, hx of chronic SDH, presents with weakness, weight loss, nausea, and diarrhea for the past month. Patient admits to 4 lb weight loss in 3 weeks, was seen by her PCP and started on Zofran one week ago. Patient reports going to wound care prior to admission for bed sores, and was advised ED evaluation. Per  at bedside, patient has been having loose BMs several times a day with foul smelling urine for 4 days. Denies fever. Patient also has felt too weak to get out of bed and started using a diaper to urinate/have BMs. Has mainly been wheelchair bound recently due to weakness, but at baseline patient is able to stand and walk short distances. Patient uses 2L NC at home and noticed her SpO2 at 75% on RA with ambulation. Patient was also switched to Oxcarbazepine 2-3 months ago and noticed her symptoms of anxiety, weakness, and "memory" have worsened.  Of note, at night when patient takes her Oxcarbazepine, she starts yelling and having "hallucinations."    ----  INTERVAL HPI/OVERNIGHT EVENTS: Pt seen and evaluated at the bedside. No acute overnight events occurred. No events today but clearly pt's hallucinations exacerbated by the steroids which were increased yesterday. Pt is tolerating some diet. Remains off pressors. She hallucinated overnight and remains a poor historian. Unable to obtain ROS. Daughter @ bedside and we discussed the tx plan.     ----  PAST MEDICAL & SURGICAL HISTORY:  Interstitial lung disease: on home o2 prn  NHL (non-Hodgkin's lymphoma): Agem 43 sp chemo/rt/stem cell  Transient cerebral ischemia, unspecified type  Mitral prolapse  History of tonsillectomy  History of appendectomy      FAMILY HISTORY:  Family history of stroke  Family history of breast cancer: Mother      Allergies    IV Contrast (Anaphylaxis)  shellfish. (Anaphylaxis)    Intolerances        ----  REVIEW OF SYSTEMS:  unable to obtain full 10 system ROS as pt is on precedex and is an unreliable historian    ----  PHYSICAL EXAM:  GENERAL: patient appears chronically ill and debilitated   ENMT: oropharynx clear without erythema, moist mucous membranes, plaque on tongue  NECK: supple, soft, no thyromegaly noted  LUNGS: coarse rhonchi auscultated throughout mid chest b/l but L>R . R sided CT in place now without any drainage  HEART: soft S1/S2, regular rate and rhythm, no murmurs noted, no noted edema to b/l LE  GASTROINTESTINAL: abdomen is soft, nontender, minimally distended, active bowel sounds, no palpable masses  INTEGUMENT: pallor noted, good skin turgor, appropriate for ethnicity  MUSCULOSKELETAL: no clubbing or cyanosis, no obvious deformity  NEUROLOGIC: awake, alert, oriented but declining to answer orientation questions, good muscle tone in 3 extremities, LUE wrist is contracted. brace was removed.     T(C): 36.6 (01-14-20 @ 19:07), Max: 37.1 (01-13-20 @ 23:40)  HR: 100 (01-14-20 @ 20:00) (72 - 120)  BP: 149/89 (01-14-20 @ 20:00) (103/66 - 179/92)  RR: 33 (01-14-20 @ 20:00) (21 - 45)  SpO2: 96% (01-14-20 @ 20:00) (91% - 100%)  Wt(kg): --    ----  I&O's Summary    13 Jan 2020 07:01  -  14 Jan 2020 07:00  --------------------------------------------------------  IN: 3133.2 mL / OUT: 1870 mL / NET: 1263.2 mL    14 Jan 2020 07:01  -  14 Jan 2020 21:20  --------------------------------------------------------  IN: 887 mL / OUT: 900 mL / NET: -13 mL        LABS:                        10.8   9.59  )-----------( 607      ( 14 Jan 2020 05:41 )             35.2     01-14    142  |  97  |  6<L>  ----------------------------<  144<H>  3.9   |  39<H>  |  0.38<L>    Ca    9.3      14 Jan 2020 05:41  Phos  2.4     01-14  Mg     2.1     01-14    TPro  6.0  /  Alb  2.4<L>  /  TBili  0.2  /  DBili  x   /  AST  20  /  ALT  24  /  AlkPhos  87  01-14        CAPILLARY BLOOD GLUCOSE      POCT Blood Glucose.: 141 mg/dL (14 Jan 2020 18:20)

## 2020-01-14 NOTE — PROGRESS NOTE ADULT - PROBLEM SELECTOR PLAN 4
moderate right pneumothorax, chronic advanced ILD, chronically O2 dependent  - episodes of tachypnea/dyspnea warranted discussion with CT surgery regarding placement of CT.    - CT placed by thoracic surgery. O2 requirements have somewhat improved  - stress dose steroids 500mg q12 x3d  - pt is not alert enough to consent at this juncture and family has consented to therapy to this point  - overall prognosis is poor

## 2020-01-14 NOTE — PROGRESS NOTE ADULT - PROBLEM SELECTOR PLAN 1
suspect 2/2 aspiration pna and ILD  - pulse dose steroids started 1/13/20  - remains on high dose FiO2 and requiring 30+L / minute of high flow  - discussed w/ family @ bedside, agree w/ this plan for now

## 2020-01-14 NOTE — PROGRESS NOTE ADULT - PROBLEM SELECTOR PLAN 2
most likely 2/2 hospital acquired pna (suspect gram negative rods) with hypoxia   - ID consulted (Ermias). last day of zosyn is today  - suspect gram neg pna is primary driving force for resp failure and sepsis  - off vasopressors now. shock has resolved. remains on high dose precedex.   - fluconazole added for thrush which developed today

## 2020-01-14 NOTE — PROGRESS NOTE ADULT - SUBJECTIVE AND OBJECTIVE BOX
Date/Time Patient Seen:  		  Referring MD:   Data Reviewed	       Patient is a 59y old  Female who presents with a chief complaint of pneumothorax, colitis, UTI (13 Jan 2020 17:20)      Subjective/HPI     PAST MEDICAL & SURGICAL HISTORY:  Interstitial lung disease: on home o2 prn  NHL (non-Hodgkin's lymphoma): Agem 43 sp chemo/rt/stem cell  Transient cerebral ischemia, unspecified type  Mitral prolapse  Pulmonary disease  History of tonsillectomy  History of appendectomy        Medication list         MEDICATIONS  (STANDING):  dexMEDEtomidine Infusion 0.3 MICROgram(s)/kG/Hr (4.08 mL/Hr) IV Continuous <Continuous>  enoxaparin Injectable 40 milliGRAM(s) SubCutaneous daily  hydrocortisone 2.5% Rectal Cream 1 Application(s) Rectal two times a day  hydrocortisone hemorrhoidal Suppository 1 Suppository(s) Rectal two times a day  influenza   Vaccine 0.5 milliLiter(s) IntraMuscular once  lactated ringers. 1000 milliLiter(s) (100 mL/Hr) IV Continuous <Continuous>  lactobacillus acidophilus 1 Tablet(s) Oral three times a day with meals  lidocaine 2% Gel 1 Application(s) Topical three times a day  mesalamine Suppository 1000 milliGRAM(s) Rectal at bedtime  methylPREDNISolone sodium succinate IVPB 500 milliGRAM(s) IV Intermittent every 12 hours  morphine  - Injectable 1 milliGRAM(s) IV Push once  norepinephrine Infusion 0.05 MICROgram(s)/kG/Min (5.1 mL/Hr) IV Continuous <Continuous>  OXcarbazepine 300 milliGRAM(s) Oral two times a day  pantoprazole    Tablet 40 milliGRAM(s) Oral before breakfast  piperacillin/tazobactam IVPB.. 3.375 Gram(s) IV Intermittent every 8 hours  potassium phosphate / sodium phosphate powder 1 Packet(s) Oral three times a day  sucralfate 1 Gram(s) Oral two times a day  tiotropium 18 MICROgram(s) Capsule 1 Capsule(s) Inhalation daily  witch hazel Pads 1 Application(s) Topical three times a day  zinc oxide 20% Ointment 1 Application(s) Topical two times a day    MEDICATIONS  (PRN):  acetaminophen   Tablet .. 650 milliGRAM(s) Oral every 6 hours PRN Temp greater or equal to 38C (100.4F), Mild Pain (1 - 3)  ALBUTerol    0.083% 2.5 milliGRAM(s) Nebulizer every 6 hours PRN Shortness of Breath and/or Wheezing  morphine  - Injectable 1 milliGRAM(s) IV Push every 4 hours PRN Severe Pain (7 - 10)  ondansetron    Tablet 4 milliGRAM(s) Oral every 12 hours PRN Nausea  simethicone 80 milliGRAM(s) Chew two times a day PRN Gas         Vitals log        ICU Vital Signs Last 24 Hrs  T(C): 37.1 (13 Jan 2020 23:40), Max: 37.1 (13 Jan 2020 23:40)  T(F): 98.8 (13 Jan 2020 23:40), Max: 98.8 (13 Jan 2020 23:40)  HR: 120 (14 Jan 2020 06:00) (82 - 120)  BP: 179/92 (14 Jan 2020 06:00) (102/59 - 179/92)  BP(mean): 127 (14 Jan 2020 06:00) (74 - 155)  ABP: --  ABP(mean): --  RR: 45 (14 Jan 2020 06:00) (22 - 45)  SpO2: 93% (14 Jan 2020 06:00) (86% - 100%)           Input and Output:  I&O's Detail    13 Jan 2020 07:01  -  14 Jan 2020 07:00  --------------------------------------------------------  IN:    dexmedetomidine Infusion: 163.2 mL    lactated ringers.: 2400 mL    Oral Fluid: 220 mL    Solution: 50 mL    Solution: 300 mL  Total IN: 3133.2 mL    OUT:    Voided: 1870 mL  Total OUT: 1870 mL    Total NET: 1263.2 mL          Lab Data                        10.8   9.59  )-----------( 607      ( 14 Jan 2020 05:41 )             35.2     01-14    142  |  97  |  6<L>  ----------------------------<  144<H>  3.9   |  39<H>  |  0.38<L>    Ca    9.3      14 Jan 2020 05:41  Phos  2.4     01-14  Mg     2.1     01-14    TPro  6.0  /  Alb  2.4<L>  /  TBili  0.2  /  DBili  x   /  AST  20  /  ALT  24  /  AlkPhos  87  01-14            Review of Systems	      Objective     Physical Examination    heart 1s2  lung dec BS  abd soft  head nc  on high flow NC      Pertinent Lab findings & Imaging      Shauna:  NO   Adequate UO     I&O's Detail    13 Jan 2020 07:01  -  14 Jan 2020 07:00  --------------------------------------------------------  IN:    dexmedetomidine Infusion: 163.2 mL    lactated ringers.: 2400 mL    Oral Fluid: 220 mL    Solution: 50 mL    Solution: 300 mL  Total IN: 3133.2 mL    OUT:    Voided: 1870 mL  Total OUT: 1870 mL    Total NET: 1263.2 mL               Discussed with:     Cultures:	        Radiology

## 2020-01-14 NOTE — PROGRESS NOTE ADULT - SUBJECTIVE AND OBJECTIVE BOX
Subjective:  no acute events   Patient thinks she has had slight improvement, ? unchanged respiratory status after chest tube placement over the weekend   She feels moderately labored breathing     Vital Signs:  Vital Signs Last 24 Hrs  T(C): 36.8 (01-14-20 @ 08:04), Max: 37.1 (01-13-20 @ 23:40)  T(F): 98.2 (01-14-20 @ 08:04), Max: 98.8 (01-13-20 @ 23:40)  HR: 99 (01-14-20 @ 08:12) (82 - 120)  BP: 158/81 (01-14-20 @ 07:00) (102/59 - 179/92)  RR: 30 (01-14-20 @ 08:12) (22 - 45)  SpO2: 97% (01-14-20 @ 08:12) (86% - 100%) on (O2)    Telemetry/Alarms:    Relevant labs, radiology and Medications reviewed                        10.8   9.59  )-----------( 607      ( 14 Jan 2020 05:41 )             35.2     01-14    142  |  97  |  6<L>  ----------------------------<  144<H>  3.9   |  39<H>  |  0.38<L>    Ca    9.3      14 Jan 2020 05:41  Phos  2.4     01-14  Mg     2.1     01-14    TPro  6.0  /  Alb  2.4<L>  /  TBili  0.2  /  DBili  x   /  AST  20  /  ALT  24  /  AlkPhos  87  01-14      MEDICATIONS  (STANDING):  dexMEDEtomidine Infusion 0.3 MICROgram(s)/kG/Hr (4.08 mL/Hr) IV Continuous <Continuous>  enoxaparin Injectable 40 milliGRAM(s) SubCutaneous daily  hydrocortisone 2.5% Rectal Cream 1 Application(s) Rectal two times a day  hydrocortisone hemorrhoidal Suppository 1 Suppository(s) Rectal two times a day  influenza   Vaccine 0.5 milliLiter(s) IntraMuscular once  lactated ringers. 1000 milliLiter(s) (100 mL/Hr) IV Continuous <Continuous>  lactobacillus acidophilus 1 Tablet(s) Oral three times a day with meals  lidocaine 2% Gel 1 Application(s) Topical three times a day  mesalamine Suppository 1000 milliGRAM(s) Rectal at bedtime  methylPREDNISolone sodium succinate IVPB 500 milliGRAM(s) IV Intermittent every 12 hours  morphine  - Injectable 1 milliGRAM(s) IV Push once  norepinephrine Infusion 0.05 MICROgram(s)/kG/Min (5.1 mL/Hr) IV Continuous <Continuous>  OXcarbazepine 300 milliGRAM(s) Oral two times a day  pantoprazole    Tablet 40 milliGRAM(s) Oral before breakfast  potassium phosphate / sodium phosphate powder 1 Packet(s) Oral three times a day  sucralfate 1 Gram(s) Oral two times a day  tiotropium 18 MICROgram(s) Capsule 1 Capsule(s) Inhalation daily  witch hazel Pads 1 Application(s) Topical three times a day  zinc oxide 20% Ointment 1 Application(s) Topical two times a day    MEDICATIONS  (PRN):  acetaminophen   Tablet .. 650 milliGRAM(s) Oral every 6 hours PRN Temp greater or equal to 38C (100.4F), Mild Pain (1 - 3)  ALBUTerol    0.083% 2.5 milliGRAM(s) Nebulizer every 6 hours PRN Shortness of Breath and/or Wheezing  morphine  - Injectable 1 milliGRAM(s) IV Push every 4 hours PRN Severe Pain (7 - 10)  ondansetron    Tablet 4 milliGRAM(s) Oral every 12 hours PRN Nausea  simethicone 80 milliGRAM(s) Chew two times a day PRN Gas      Physical exam  Gen awake and alert   Neuro appropriate   Card sinus tach   Pulm  diminished breath sounds bilaterally   Abd  soft nd/nt   Ext warm and well perfused         I&O's Summary    13 Jan 2020 07:01  -  14 Jan 2020 07:00  --------------------------------------------------------  IN: 3133.2 mL / OUT: 1870 mL / NET: 1263.2 mL        Assessment  59y Female  w/ PAST MEDICAL & SURGICAL HISTORY:  Interstitial lung disease: on home o2 prn  NHL (non-Hodgkin's lymphoma): Agem 43 sp chemo/rt/stem cell  Transient cerebral ischemia, unspecified type  Mitral prolapse  History of tonsillectomy  History of appendectomy  admitted with complaints of Patient is a 59y old  Female who presents with a chief complaint of pneumothorax, colitis, UTI (14 Jan 2020 10:48)  .  On (Date), patient underwent Chest tube insertion  . Postoperative course/issues:    PLAN    Patient seen and examined. When I placed the tube to -40 cm suction, I did see some increased evacuation of air.   There was mild resistance to flushing the tube, but I was able to flush it.   I personally spoke to ICU staff. I think if her respiratory symptoms do not get better over the next few days, I think it would be of benefit to transfer the Patient to a tertiary care center given her ILD.   Will repeat CT chest to confirm tube placement/lung expansion/resolution of infiltrates.

## 2020-01-14 NOTE — PROGRESS NOTE ADULT - SUBJECTIVE AND OBJECTIVE BOX
Neurology follow up note    ROMIE [id number]      Interval History:    Patient see with daughter breathing stable occ headaches     MEDICATIONS    acetaminophen   Tablet .. 650 milliGRAM(s) Oral every 6 hours PRN  ALBUTerol    0.083% 2.5 milliGRAM(s) Nebulizer every 6 hours PRN  dexMEDEtomidine Infusion 0.3 MICROgram(s)/kG/Hr IV Continuous <Continuous>  enoxaparin Injectable 40 milliGRAM(s) SubCutaneous daily  hydrocortisone 2.5% Rectal Cream 1 Application(s) Rectal two times a day  hydrocortisone hemorrhoidal Suppository 1 Suppository(s) Rectal two times a day  influenza   Vaccine 0.5 milliLiter(s) IntraMuscular once  lactated ringers. 1000 milliLiter(s) IV Continuous <Continuous>  lactobacillus acidophilus 1 Tablet(s) Oral three times a day with meals  lidocaine 2% Gel 1 Application(s) Topical three times a day  mesalamine Suppository 1000 milliGRAM(s) Rectal at bedtime  methylPREDNISolone sodium succinate IVPB 500 milliGRAM(s) IV Intermittent every 12 hours  morphine  - Injectable 1 milliGRAM(s) IV Push every 4 hours PRN  morphine  - Injectable 1 milliGRAM(s) IV Push once  norepinephrine Infusion 0.05 MICROgram(s)/kG/Min IV Continuous <Continuous>  ondansetron    Tablet 4 milliGRAM(s) Oral every 12 hours PRN  OXcarbazepine 300 milliGRAM(s) Oral two times a day  pantoprazole    Tablet 40 milliGRAM(s) Oral before breakfast  piperacillin/tazobactam IVPB.. 3.375 Gram(s) IV Intermittent every 8 hours  potassium phosphate / sodium phosphate powder 1 Packet(s) Oral three times a day  simethicone 80 milliGRAM(s) Chew two times a day PRN  sucralfate 1 Gram(s) Oral two times a day  tiotropium 18 MICROgram(s) Capsule 1 Capsule(s) Inhalation daily  witch hazel Pads 1 Application(s) Topical three times a day  zinc oxide 20% Ointment 1 Application(s) Topical two times a day      Allergies    IV Contrast (Anaphylaxis)  shellfish. (Anaphylaxis)    Intolerances            Vital Signs Last 24 Hrs  T(C): 36.8 (14 Jan 2020 08:04), Max: 37.1 (13 Jan 2020 23:40)  T(F): 98.2 (14 Jan 2020 08:04), Max: 98.8 (13 Jan 2020 23:40)  HR: 99 (14 Jan 2020 08:12) (82 - 120)  BP: 158/81 (14 Jan 2020 07:00) (102/59 - 179/92)  BP(mean): 112 (14 Jan 2020 07:00) (74 - 155)  RR: 30 (14 Jan 2020 08:12) (22 - 45)  SpO2: 97% (14 Jan 2020 08:12) (86% - 100%)      REVIEW OF SYSTEMS:  Constitutional:  The patient denies fever, chills, or night sweats.  Head:  Occ headaches.  Eyes:  No double vision or blurry vision.  Ears:  No ringing in the ears.  Neck:  No neck pain.  Respiratory:  Occasional cough with shortness of breath.  Cardiovascular:  no chest pain.  Abdomen:  occ nausea,  no vomiting, or abdominal pain.  Extremities/Neurological:  No numbness or tingling.  Musculoskeletal:  Occasional joint pain.    PHYSICAL EXAMINATION:   HEENT:  Head:  Normocephalic, atraumatic.  Eyes:  No scleral icterus.  Ears:  Hearing bilaterally appeared to be intact.  NECK:  Supple.  RESPIRATORY:  Decreased breath sounds bilaterally, but most prominent on the right.  CARDIOVASCULAR:  S1 and S2 heard.  ABDOMEN:  Soft and nontender.  Extremities:  No clubbing or cyanosis were noted.      NEUROLOGIC:  The patient is awake and alert.    Extraocular movements were intact.  Pupils were equal, round, and reactive bilaterally 3 mm to 2 mm.  Speech was fluent.  Smile was symmetric.  Motor:  Right upper was 5/5, left upper  arm brace was 3/5 decrease rom hand and finger in flexed position , right lower was 4/5, left lower was 3+/5.  As per my conversation with the spouse, after her apparent event back in March, questionable seizure versus questionable stroke.  She was left with left-sided weakness.  Sensory:  Bilateral upper and lower appeared intact to light touch.                LABS:  CBC Full  -  ( 14 Jan 2020 05:41 )  WBC Count : 9.59 K/uL  RBC Count : 3.84 M/uL  Hemoglobin : 10.8 g/dL  Hematocrit : 35.2 %  Platelet Count - Automated : 607 K/uL  Mean Cell Volume : 91.7 fl  Mean Cell Hemoglobin : 28.1 pg  Mean Cell Hemoglobin Concentration : 30.7 gm/dL  Auto Neutrophil # : 8.42 K/uL  Auto Lymphocyte # : 0.57 K/uL  Auto Monocyte # : 0.51 K/uL  Auto Eosinophil # : 0.00 K/uL  Auto Basophil # : 0.01 K/uL  Auto Neutrophil % : 87.9 %  Auto Lymphocyte % : 5.9 %  Auto Monocyte % : 5.3 %  Auto Eosinophil % : 0.0 %  Auto Basophil % : 0.1 %      01-14    142  |  97  |  6<L>  ----------------------------<  144<H>  3.9   |  39<H>  |  0.38<L>    Ca    9.3      14 Jan 2020 05:41  Phos  2.4     01-14  Mg     2.1     01-14    TPro  6.0  /  Alb  2.4<L>  /  TBili  0.2  /  DBili  x   /  AST  20  /  ALT  24  /  AlkPhos  87  01-14    Hemoglobin A1C:     LIVER FUNCTIONS - ( 14 Jan 2020 05:41 )  Alb: 2.4 g/dL / Pro: 6.0 g/dL / ALK PHOS: 87 U/L / ALT: 24 U/L / AST: 20 U/L / GGT: x           Vitamin B12         RADIOLOGY      ANALYSIS AND PLAN:  This is a 59-year-old with a history of possibly epilepsy verse TIAs, history of chronic subdural hydromas and change in mental status.    1.	For episode of change in mental status, as per my conversation with the spouse, these appear to occur primarily at the same time at night for the last three to four weeks.  The patient has been on Trileptal for about eight to nine months.  Suspect less likely this is Trileptal, Questionable, the patient could have any type of sleep-related disorder causing these events to occur at night.  I spoke with the spouse, the patient should undergo sleep studies.  2.	For history of chronic subdural hematoma, these appeared to have resolved from previous MRI.  3.	For history of possible underlying epilepsy, for now, I will continue the patient on her Trileptal..  4.	Monitor CO2 levels as needed and respiratory status   5.	spoke outside neurologist Dr. Wallaec in past agrees to continue trileptal for now  His telephone number is [phone].  6.	Spouse's name is Marialuisa, his telephone number is [phone] spoke to him at bedside 1/13/2020  7.	steroids as needed   8.	H/O R pigtail placed by thoracic surgery   9.	no new events   10.	seen with daughter today 1/14/2020  11.	Greater than 20 minutes of time was spent with the patient, plan of care, reviewing data, speaking to the family and multidisciplinary healthcare team

## 2020-01-14 NOTE — PROGRESS NOTE ADULT - SUBJECTIVE AND OBJECTIVE BOX
INTERVAL HPI/OVERNIGHT EVENTS:  pt seen and examined  dtr present, pt confused in bed  dec po per dtr  no acute gi issues overnight per rn, off pressors    MEDICATIONS  (STANDING):  dexMEDEtomidine Infusion 0.3 MICROgram(s)/kG/Hr (4.08 mL/Hr) IV Continuous <Continuous>  enoxaparin Injectable 40 milliGRAM(s) SubCutaneous daily  hydrocortisone 2.5% Rectal Cream 1 Application(s) Rectal two times a day  hydrocortisone hemorrhoidal Suppository 1 Suppository(s) Rectal two times a day  influenza   Vaccine 0.5 milliLiter(s) IntraMuscular once  lactated ringers. 1000 milliLiter(s) (100 mL/Hr) IV Continuous <Continuous>  lactobacillus acidophilus 1 Tablet(s) Oral three times a day with meals  lidocaine 2% Gel 1 Application(s) Topical three times a day  mesalamine Suppository 1000 milliGRAM(s) Rectal at bedtime  methylPREDNISolone sodium succinate IVPB 500 milliGRAM(s) IV Intermittent every 12 hours  morphine  - Injectable 1 milliGRAM(s) IV Push once  norepinephrine Infusion 0.05 MICROgram(s)/kG/Min (5.1 mL/Hr) IV Continuous <Continuous>  OXcarbazepine 300 milliGRAM(s) Oral two times a day  pantoprazole    Tablet 40 milliGRAM(s) Oral before breakfast  piperacillin/tazobactam IVPB.. 3.375 Gram(s) IV Intermittent every 8 hours  potassium phosphate / sodium phosphate powder 1 Packet(s) Oral three times a day  sucralfate 1 Gram(s) Oral two times a day  tiotropium 18 MICROgram(s) Capsule 1 Capsule(s) Inhalation daily  witch hazel Pads 1 Application(s) Topical three times a day  zinc oxide 20% Ointment 1 Application(s) Topical two times a day    MEDICATIONS  (PRN):  acetaminophen   Tablet .. 650 milliGRAM(s) Oral every 6 hours PRN Temp greater or equal to 38C (100.4F), Mild Pain (1 - 3)  ALBUTerol    0.083% 2.5 milliGRAM(s) Nebulizer every 6 hours PRN Shortness of Breath and/or Wheezing  morphine  - Injectable 1 milliGRAM(s) IV Push every 4 hours PRN Severe Pain (7 - 10)  ondansetron    Tablet 4 milliGRAM(s) Oral every 12 hours PRN Nausea  simethicone 80 milliGRAM(s) Chew two times a day PRN Gas      Allergies    IV Contrast (Anaphylaxis)  shellfish. (Anaphylaxis)    Intolerances        Review of Systems:    unable to obtain      Vital Signs Last 24 Hrs  T(C): 36.8 (14 Jan 2020 08:04), Max: 37.1 (13 Jan 2020 23:40)  T(F): 98.2 (14 Jan 2020 08:04), Max: 98.8 (13 Jan 2020 23:40)  HR: 99 (14 Jan 2020 08:12) (82 - 120)  BP: 158/81 (14 Jan 2020 07:00) (102/59 - 179/92)  BP(mean): 112 (14 Jan 2020 07:00) (74 - 155)  RR: 30 (14 Jan 2020 08:12) (22 - 45)  SpO2: 97% (14 Jan 2020 08:12) (86% - 100%)    PHYSICAL EXAM:  Constitutional: lying in bed on hi flow  HEENT: ncat  Neck: No LAD  Gastrointestinal: soft nt nd  Extremities: No peripheral edema  Neurological: awake alert but confused      LABS:                        10.8   9.59  )-----------( 607      ( 14 Jan 2020 05:41 )             35.2     01-14    142  |  97  |  6<L>  ----------------------------<  144<H>  3.9   |  39<H>  |  0.38<L>    Ca    9.3      14 Jan 2020 05:41  Phos  2.4     01-14  Mg     2.1     01-14    TPro  6.0  /  Alb  2.4<L>  /  TBili  0.2  /  DBili  x   /  AST  20  /  ALT  24  /  AlkPhos  87  01-14          RADIOLOGY & ADDITIONAL TESTS:

## 2020-01-14 NOTE — PROGRESS NOTE ADULT - PROBLEM SELECTOR PLAN 1
ILD - chr lung disease - right PTX - anxious -   on high flow NC  s/p r CT - no meaningful improvement in PTX - cxr noted -   on high dose Pulse Steroids - as per ICU team - ? utility  bronchodilators  I and O  keep MAP > 60  supportive medical regimen and care  on ABX  will follow  prognosis guarded

## 2020-01-14 NOTE — PROGRESS NOTE ADULT - PROBLEM SELECTOR PLAN 3
most likely 2/2 hospital acquired pna (suspect gram negative rods) with hypoxia   - ID consulted (Ermias). complete zosyn today  - suspect gram neg pna is primary driving force for resp failure and sepsis  - pt multifactorial shock today and has been on levophed throughout the day although. wean precedex as tolerated. added albumin. wean off pressors as BP tolerates

## 2020-01-14 NOTE — PROGRESS NOTE ADULT - PROBLEM SELECTOR PLAN 1
ct showing stercoral colitis c/b rectal pain in setting of hemorrhoids  overall improved  monitoring off bowel regimen   cont lido and hydrocortisone cream   cont anusol supp bid, witch hazel pads prn  cont canasa suppository  monitor exam/gi fxn  diet as tolerated; consider appetite stimulant   up to date w colonoscopy, 4/2019 showed only non bleeding internal hemorrhoids; given persistence of symptoms, offered repeat c-scope but pt deferred

## 2020-01-14 NOTE — PROGRESS NOTE ADULT - PROBLEM SELECTOR PLAN 9
DVT prophylaxis: Lovenox SQ  Fall risk
chronic, stable  -Continue Pantoprazole daily
DVT prophylaxis: Lovenox SQ  Fall risk

## 2020-01-15 LAB
ALBUMIN SERPL ELPH-MCNC: 2.2 G/DL — LOW (ref 3.3–5)
ALP SERPL-CCNC: 76 U/L — SIGNIFICANT CHANGE UP (ref 40–120)
ALT FLD-CCNC: 26 U/L — SIGNIFICANT CHANGE UP (ref 12–78)
ANION GAP SERPL CALC-SCNC: 4 MMOL/L — LOW (ref 5–17)
AST SERPL-CCNC: 21 U/L — SIGNIFICANT CHANGE UP (ref 15–37)
BASOPHILS # BLD AUTO: 0 K/UL — SIGNIFICANT CHANGE UP (ref 0–0.2)
BASOPHILS NFR BLD AUTO: 0 % — SIGNIFICANT CHANGE UP (ref 0–2)
BILIRUB SERPL-MCNC: 0.1 MG/DL — LOW (ref 0.2–1.2)
BUN SERPL-MCNC: 10 MG/DL — SIGNIFICANT CHANGE UP (ref 7–23)
CALCIUM SERPL-MCNC: 8.8 MG/DL — SIGNIFICANT CHANGE UP (ref 8.5–10.1)
CHLORIDE SERPL-SCNC: 96 MMOL/L — SIGNIFICANT CHANGE UP (ref 96–108)
CO2 SERPL-SCNC: 42 MMOL/L — HIGH (ref 22–31)
CREAT SERPL-MCNC: 0.34 MG/DL — LOW (ref 0.5–1.3)
EOSINOPHIL # BLD AUTO: 0 K/UL — SIGNIFICANT CHANGE UP (ref 0–0.5)
EOSINOPHIL NFR BLD AUTO: 0 % — SIGNIFICANT CHANGE UP (ref 0–6)
GLUCOSE SERPL-MCNC: 143 MG/DL — HIGH (ref 70–99)
HCT VFR BLD CALC: 31.8 % — LOW (ref 34.5–45)
HGB BLD-MCNC: 9.9 G/DL — LOW (ref 11.5–15.5)
IMM GRANULOCYTES NFR BLD AUTO: 0.9 % — SIGNIFICANT CHANGE UP (ref 0–1.5)
LYMPHOCYTES # BLD AUTO: 0.45 K/UL — LOW (ref 1–3.3)
LYMPHOCYTES # BLD AUTO: 5.3 % — LOW (ref 13–44)
MAGNESIUM SERPL-MCNC: 2.1 MG/DL — SIGNIFICANT CHANGE UP (ref 1.6–2.6)
MCHC RBC-ENTMCNC: 28 PG — SIGNIFICANT CHANGE UP (ref 27–34)
MCHC RBC-ENTMCNC: 31.1 GM/DL — LOW (ref 32–36)
MCV RBC AUTO: 89.8 FL — SIGNIFICANT CHANGE UP (ref 80–100)
MONOCYTES # BLD AUTO: 0.29 K/UL — SIGNIFICANT CHANGE UP (ref 0–0.9)
MONOCYTES NFR BLD AUTO: 3.4 % — SIGNIFICANT CHANGE UP (ref 2–14)
NEUTROPHILS # BLD AUTO: 7.72 K/UL — HIGH (ref 1.8–7.4)
NEUTROPHILS NFR BLD AUTO: 90.4 % — HIGH (ref 43–77)
NRBC # BLD: 0 /100 WBCS — SIGNIFICANT CHANGE UP (ref 0–0)
PHOSPHATE SERPL-MCNC: 2.6 MG/DL — SIGNIFICANT CHANGE UP (ref 2.5–4.5)
PLATELET # BLD AUTO: 616 K/UL — HIGH (ref 150–400)
POTASSIUM SERPL-MCNC: 3.3 MMOL/L — LOW (ref 3.5–5.3)
POTASSIUM SERPL-SCNC: 3.3 MMOL/L — LOW (ref 3.5–5.3)
PROT SERPL-MCNC: 5.7 G/DL — LOW (ref 6–8.3)
RBC # BLD: 3.54 M/UL — LOW (ref 3.8–5.2)
RBC # FLD: 14.3 % — SIGNIFICANT CHANGE UP (ref 10.3–14.5)
SODIUM SERPL-SCNC: 142 MMOL/L — SIGNIFICANT CHANGE UP (ref 135–145)
WBC # BLD: 8.54 K/UL — SIGNIFICANT CHANGE UP (ref 3.8–10.5)
WBC # FLD AUTO: 8.54 K/UL — SIGNIFICANT CHANGE UP (ref 3.8–10.5)

## 2020-01-15 PROCEDURE — 99233 SBSQ HOSP IP/OBS HIGH 50: CPT

## 2020-01-15 PROCEDURE — 99222 1ST HOSP IP/OBS MODERATE 55: CPT

## 2020-01-15 PROCEDURE — 71045 X-RAY EXAM CHEST 1 VIEW: CPT | Mod: 26

## 2020-01-15 RX ORDER — POTASSIUM CHLORIDE 20 MEQ
40 PACKET (EA) ORAL ONCE
Refills: 0 | Status: COMPLETED | OUTPATIENT
Start: 2020-01-15 | End: 2020-01-15

## 2020-01-15 RX ORDER — PANTOPRAZOLE SODIUM 20 MG/1
40 TABLET, DELAYED RELEASE ORAL DAILY
Refills: 0 | Status: DISCONTINUED | OUTPATIENT
Start: 2020-01-15 | End: 2020-01-24

## 2020-01-15 RX ADMIN — AER TRAVELER 1 APPLICATION(S): 0.5 SOLUTION RECTAL; TOPICAL at 05:24

## 2020-01-15 RX ADMIN — Medication 1000 MILLIGRAM(S): at 21:18

## 2020-01-15 RX ADMIN — ZINC OXIDE 1 APPLICATION(S): 200 OINTMENT TOPICAL at 05:25

## 2020-01-15 RX ADMIN — Medication 1 TABLET(S): at 17:42

## 2020-01-15 RX ADMIN — Medication 100 MILLIGRAM(S): at 00:19

## 2020-01-15 RX ADMIN — LIDOCAINE 1 APPLICATION(S): 4 CREAM TOPICAL at 21:17

## 2020-01-15 RX ADMIN — Medication 1 PACKET(S): at 21:18

## 2020-01-15 RX ADMIN — DEXMEDETOMIDINE HYDROCHLORIDE IN 0.9% SODIUM CHLORIDE 4.08 MICROGRAM(S)/KG/HR: 4 INJECTION INTRAVENOUS at 03:14

## 2020-01-15 RX ADMIN — ZINC OXIDE 1 APPLICATION(S): 200 OINTMENT TOPICAL at 18:48

## 2020-01-15 RX ADMIN — Medication 100 MILLIGRAM(S): at 12:31

## 2020-01-15 RX ADMIN — LIDOCAINE 1 APPLICATION(S): 4 CREAM TOPICAL at 05:25

## 2020-01-15 RX ADMIN — Medication 100 MILLIGRAM(S): at 23:23

## 2020-01-15 RX ADMIN — Medication 1 APPLICATION(S): at 11:23

## 2020-01-15 RX ADMIN — Medication 1 GRAM(S): at 17:42

## 2020-01-15 RX ADMIN — DEXMEDETOMIDINE HYDROCHLORIDE IN 0.9% SODIUM CHLORIDE 4.08 MICROGRAM(S)/KG/HR: 4 INJECTION INTRAVENOUS at 23:39

## 2020-01-15 RX ADMIN — Medication 40 MILLIEQUIVALENT(S): at 08:48

## 2020-01-15 RX ADMIN — AER TRAVELER 1 APPLICATION(S): 0.5 SOLUTION RECTAL; TOPICAL at 21:17

## 2020-01-15 RX ADMIN — Medication 1 SUPPOSITORY(S): at 05:24

## 2020-01-15 RX ADMIN — LIDOCAINE 1 APPLICATION(S): 4 CREAM TOPICAL at 15:09

## 2020-01-15 RX ADMIN — Medication 1 PACKET(S): at 15:09

## 2020-01-15 RX ADMIN — DEXMEDETOMIDINE HYDROCHLORIDE IN 0.9% SODIUM CHLORIDE 4.08 MICROGRAM(S)/KG/HR: 4 INJECTION INTRAVENOUS at 13:25

## 2020-01-15 RX ADMIN — DEXMEDETOMIDINE HYDROCHLORIDE IN 0.9% SODIUM CHLORIDE 4.08 MICROGRAM(S)/KG/HR: 4 INJECTION INTRAVENOUS at 07:58

## 2020-01-15 RX ADMIN — OXCARBAZEPINE 300 MILLIGRAM(S): 300 TABLET, FILM COATED ORAL at 21:18

## 2020-01-15 RX ADMIN — PANTOPRAZOLE SODIUM 40 MILLIGRAM(S): 20 TABLET, DELAYED RELEASE ORAL at 05:24

## 2020-01-15 RX ADMIN — Medication 1 TABLET(S): at 12:29

## 2020-01-15 RX ADMIN — Medication 1 TABLET(S): at 08:49

## 2020-01-15 RX ADMIN — OXCARBAZEPINE 300 MILLIGRAM(S): 300 TABLET, FILM COATED ORAL at 08:49

## 2020-01-15 RX ADMIN — AER TRAVELER 1 APPLICATION(S): 0.5 SOLUTION RECTAL; TOPICAL at 15:09

## 2020-01-15 RX ADMIN — Medication 1 PACKET(S): at 05:24

## 2020-01-15 RX ADMIN — Medication 650 MILLIGRAM(S): at 17:41

## 2020-01-15 RX ADMIN — DEXMEDETOMIDINE HYDROCHLORIDE IN 0.9% SODIUM CHLORIDE 4.08 MICROGRAM(S)/KG/HR: 4 INJECTION INTRAVENOUS at 19:04

## 2020-01-15 RX ADMIN — TIOTROPIUM BROMIDE 1 CAPSULE(S): 18 CAPSULE ORAL; RESPIRATORY (INHALATION) at 12:29

## 2020-01-15 RX ADMIN — Medication 650 MILLIGRAM(S): at 18:00

## 2020-01-15 RX ADMIN — ENOXAPARIN SODIUM 40 MILLIGRAM(S): 100 INJECTION SUBCUTANEOUS at 12:29

## 2020-01-15 RX ADMIN — FLUCONAZOLE 100 MILLIGRAM(S): 150 TABLET ORAL at 12:29

## 2020-01-15 RX ADMIN — Medication 1 APPLICATION(S): at 21:17

## 2020-01-15 RX ADMIN — Medication 1 GRAM(S): at 05:24

## 2020-01-15 NOTE — PROGRESS NOTE ADULT - ATTENDING COMMENTS
59F h/o ILD (unknown etiology, no Bx), chronic hypoxemic respiratory failure, known chronic R PTX which has been conservatively managed, remote NHL (s/p XRT, chemo and SCT), previous CVA with R sided weakness, seizure do on trileptal, chronic SDH v hygromas admitted 12/26 with colitis/proctitis and treated with CTX and flagyl.  Hospital course complicated by development of HCAP, acute on chronic hypoxemic respiratory failure requiring high flow NC.    Neuro: delirium likely steroid induced, improved with precedex and redirection; continue trileptal for sz disorder; continue morphine prn for pain from chest tube  CV: shock state resolved, now titrated off levophed; can restart propranolol; BP remains 150-160s though frequently with anxiety, if remains elevated can add additional antihypertensive    Pulm: hypoxemic respiratory failure, multifactorial from HCAP, ILD, less likely from PTX as remains unchanged on high flow despite CT placement; to complete 3 days of  pulse dose steroids today; continue duonebs, mucomyst, hypertonic for secretions clearance; encourage incentive jesus; CT chest/ab/pel ordered to further assess current site placement of pigtail, currently unable to go due to high flow requirements, CXR grossly unchanged despite pigtail insertion, will f/u with CTSx regarding when to remove as initial plan to keep in for one week; wean FiO2 as tolerated, goal O2 sat > 88-90   GI: tolerating PO diet though poor intake; continue mesalamine and witch hazel for colitits  Renal: no acute issues  ID: zosyn course completed yesterday; started fluconazole for oral thrush  Dispo: to remain in ICU today given O2 requirements; PT eval when FiO2 requirements improved

## 2020-01-15 NOTE — PROGRESS NOTE ADULT - ASSESSMENT
59F with PMH of interstitial lung disease hx of pneumothorax (on 2L NC at home), hx pulmonary nodules, Meniere's disease, Non-Hodgkin's lymphoma (SCD/XRT/chemo at [facility] 2003), hx of CVA (3/2019 - residual L sided weakness, unable to use her L arm), not on ASA or Plavix due to GIB, Seizure disorder, tachycardia, MVP, hx of chronic SDH admitted with  severe sepsis 2/2 superimposed PNA resolving, Acute on chronic hypoxemic respiratory failure requiring high flow NC, PTX s/p chest tube    Neuro: Anxiety and steroid induced delirium - improved, may titrate off Precedex. Seizure Disorder - continue trileptal, f/u level pending from blood draw 1/10. Continue morphine prn for pain from chest tube. Avoid Xanax ( pt had adverse effect of hallucinations).  Cardio: Off Levophed gtt since yesterday, -160s, propanolol restarted will continue at current dose for now and monitor.    Pulm: Acute on chronic hypoxemic respiratory failure 2/2 ILD and HCAP which is resolving with some component of PTX. Abx completed. Continue pulse steroids, last dose at midnight, will taper steroids there after. On High flow, FiO2 titrated down to 55% FiO2, pt maintaining O2 sat of 94%. Will continue to titrate down as tolerated. Continue spiriva. Maintain chest tube to suction. Repeat CXR.   ID: Zosyn completed for PNA, fluconazole continue for oral thrush  GI: Albumin started due to low protein state, continue encouraging PO intake, poor oral intake, continue mesalamine and witch hazel for colitis.  Endo: routine Accu-Cheks, low dose sliding scale insulin coverage  Renal/lytes: normal renal function, Low K repleted, will f/u lytes in am.  Heme/Onc: DVT ppx with Lovenox  Dispo: Full code

## 2020-01-15 NOTE — PROGRESS NOTE ADULT - SUBJECTIVE AND OBJECTIVE BOX
CHIEF COMPLAINT/INTERVAL HISTORY:  Pt. seen and evaluated for acute hypoxic respiratory failure 2/2 hospital acquire pneumonia and R. pneumothorax.  Pt. is on high flow nasal O2 support.  Reports respiratory status is better today.  Tolerating IV steroids.  Completed course of IV antibiotics yesterday.      REVIEW OF SYSTEMS:  No fever, CP, or abdominal pain.     Vital Signs Last 24 Hrs  T(C): 36.5 (15 Mauricio 2020 07:26), Max: 36.7 (15 Mauricio 2020 03:26)  T(F): 97.7 (15 Mauricio 2020 07:26), Max: 98 (15 Amuricio 2020 03:26)  HR: 74 (15 Mauricio 2020 07:00) (66 - 105)  BP: 162/89 (15 Mauricio 2020 07:00) (109/69 - 171/96)  BP(mean): 120 (15 Mauricio 2020 07:00) (82 - 127)  RR: 26 (15 Mauricio 2020 07:00) (21 - 44)  SpO2: 97% (15 Mauricio 2020 07:00) (92% - 100%)    PHYSICAL EXAM:  GENERAL: NAD  HEENT: EOMI, hearing normal, conjunctiva and sclera clear, high flow NC in place  Chest: fine crackles bilaterally, no wheezing, R. chest tube  CV: S1S2, RRR,   GI: soft, +BS, NT/ND  Musculoskeletal: 1+ LE edema  Neuro: chronic weakness of LUE  Psychiatric: affect nL, mood nL  Skin: warm and dry    LABS:                        9.9    8.54  )-----------( 616      ( 15 Mauricio 2020 05:19 )             31.8     01-15    142  |  96  |  10  ----------------------------<  143<H>  3.3<L>   |  42<H>  |  0.34<L>    Ca    8.8      15 Mauricio 2020 05:19  Phos  2.6     01-15  Mg     2.1     01-15    TPro  5.7<L>  /  Alb  2.2<L>  /  TBili  0.1<L>  /  DBili  x   /  AST  21  /  ALT  26  /  AlkPhos  76  01-15    Assessment and Plan:  -Septic shock and acute hypoxic respiratory failure 2/2 hospital acquired pneumonia, right pneumothorax, and ILD:  Shock has resolved.  Off vasopressors.  Completed course of IV antibiotics.  Continue Solu-medrol 500mg IV Q12h, Spiriva inh daily, Albuterol Neb Q6h PRN, and O2 support.  Maintain chest tube.  ID, Pulmonary , Thoracic Surgery, and Intensivist f/u  -Colitis:  Completed course of IV antibiotics.  Continue mesalamine 1000mg HI QHS  -Nausea and GERD:  Zofran 4mg PO Q12h PRN, Simethicone 80mg PO BID HI, Carafate 1gm PO BID, and Protonix 40mg PO daily.  GI f/u  -Seizure disorder:  continue Trileptal 300mg PO BID  -VTE ppx: Lovenox 40mg SQ daily CHIEF COMPLAINT/INTERVAL HISTORY:  Pt. seen and evaluated for acute hypoxic respiratory failure 2/2 hospital acquire pneumonia and R. pneumothorax.  Pt. is on high flow nasal O2 support.  Reports respiratory status is better today.  Tolerating IV steroids.  Completed course of IV antibiotics yesterday.      REVIEW OF SYSTEMS:  No fever, CP, or abdominal pain.     Vital Signs Last 24 Hrs  T(C): 36.5 (15 Mauricio 2020 07:26), Max: 36.7 (15 Mauricio 2020 03:26)  T(F): 97.7 (15 Mauricio 2020 07:26), Max: 98 (15 Mauricio 2020 03:26)  HR: 74 (15 Mauricio 2020 07:00) (66 - 105)  BP: 162/89 (15 Mauricio 2020 07:00) (109/69 - 171/96)  BP(mean): 120 (15 Mauricio 2020 07:00) (82 - 127)  RR: 26 (15 Mauricio 2020 07:00) (21 - 44)  SpO2: 97% (15 Mauricio 2020 07:00) (92% - 100%)    PHYSICAL EXAM:  GENERAL: NAD  HEENT: EOMI, hearing normal, conjunctiva and sclera clear, high flow NC in place  Chest: fine crackles bilaterally, no wheezing, R. chest tube  CV: S1S2, RRR,   GI: soft, +BS, NT/ND  Musculoskeletal: 1+ LE edema  Neuro: chronic weakness of LUE  Psychiatric: affect nL, mood nL  Skin: warm and dry    LABS:                        9.9    8.54  )-----------( 616      ( 15 Mauricio 2020 05:19 )             31.8     01-15    142  |  96  |  10  ----------------------------<  143<H>  3.3<L>   |  42<H>  |  0.34<L>    Ca    8.8      15 Mauricio 2020 05:19  Phos  2.6     01-15  Mg     2.1     01-15    TPro  5.7<L>  /  Alb  2.2<L>  /  TBili  0.1<L>  /  DBili  x   /  AST  21  /  ALT  26  /  AlkPhos  76  01-15    Assessment and Plan:  -Septic shock and acute hypoxic respiratory failure 2/2 hospital acquired pneumonia, right pneumothorax, and ILD:  Shock has resolved.  Off vasopressors.  Completed course of IV antibiotics.  Continue Solu-medrol 500mg IV Q12h, Spiriva inh daily, Albuterol Neb Q6h PRN, and O2 support.  Maintain chest tube.  ID, Pulmonary , Thoracic Surgery, and Intensivist f/u  -Colitis:  Completed course of IV antibiotics.  Continue mesalamine 1000mg DC QHS  -Nausea and GERD:  Zofran 4mg PO Q12h PRN, Simethicone 80mg PO BID DC, Carafate 1gm PO BID, and Protonix 40mg PO daily.  GI f/u  -Seizure disorder:  continue Trileptal 300mg PO BID.  Neurology f/u  -VTE ppx: Lovenox 40mg SQ daily

## 2020-01-15 NOTE — CHART NOTE - NSCHARTNOTEFT_GEN_A_CORE
Assessment:   pt seen for nutrition follow up.  Chart reviewed, hospital course noted.   Off pressors. Daughter present feeding pt bfst at this time.  Pt appears calm, oriented.  Has been having episodes of hallucinations/ delirium.   Pt has not been eating well.  Daughter states pt is not much of a bfst eater normally.  Doesn't care for Ensure as daughter states pt thinks she gets loose stools from it so pt has it in her head if she drinks it she will get diarrhea.  Offered Greek yogurt, pudding, ice creams, all declined by pt.   Recommend daily MVI and Vit C 500mg bid for wound healing.     Factors impacting intake: [ ] none [ ] nausea  [ ] vomiting [ ] diarrhea [ ] constipation  [ ]chewing problems [ ] swallowing issues  [ ] other:     Diet Presciption: Diet, Regular:   Lacto-Ovo Veg (Accepts Milk Prod., Eggs)  Supplement Feeding Modality:  Oral  Ensure Enlive Cans or Servings Per Day:  1       Frequency:  Two Times a day (01-09-20 @ 09:24)    Intake: poor -fair    Current Weight:  1/12 136#, 1/8 120.1#  % Weight Change    Pertinent Medications: MEDICATIONS  (STANDING):  dexMEDEtomidine Infusion 0.3 MICROgram(s)/kG/Hr (4.08 mL/Hr) IV Continuous <Continuous>  dextrose 5%. 1000 milliLiter(s) (50 mL/Hr) IV Continuous <Continuous>  dextrose 50% Injectable 12.5 Gram(s) IV Push once  dextrose 50% Injectable 25 Gram(s) IV Push once  dextrose 50% Injectable 25 Gram(s) IV Push once  enoxaparin Injectable 40 milliGRAM(s) SubCutaneous daily  fluconAZOLE   Tablet 100 milliGRAM(s) Oral daily  hydrocortisone 2.5% Rectal Cream 1 Application(s) Rectal two times a day  hydrocortisone hemorrhoidal Suppository 1 Suppository(s) Rectal two times a day  influenza   Vaccine 0.5 milliLiter(s) IntraMuscular once  insulin lispro (HumaLOG) corrective regimen sliding scale   SubCutaneous three times a day before meals  insulin lispro (HumaLOG) corrective regimen sliding scale   SubCutaneous at bedtime  lactobacillus acidophilus 1 Tablet(s) Oral three times a day with meals  lidocaine 2% Gel 1 Application(s) Topical three times a day  mesalamine Suppository 1000 milliGRAM(s) Rectal at bedtime  methylPREDNISolone sodium succinate IVPB 500 milliGRAM(s) IV Intermittent every 12 hours  morphine  - Injectable 1 milliGRAM(s) IV Push once  OXcarbazepine 300 milliGRAM(s) Oral two times a day  pantoprazole    Tablet 40 milliGRAM(s) Oral before breakfast  potassium phosphate / sodium phosphate powder 1 Packet(s) Oral three times a day  propranolol 20 milliGRAM(s) Oral three times a day  sucralfate 1 Gram(s) Oral two times a day  tiotropium 18 MICROgram(s) Capsule 1 Capsule(s) Inhalation daily  witch hazel Pads 1 Application(s) Topical three times a day  zinc oxide 20% Ointment 1 Application(s) Topical two times a day    MEDICATIONS  (PRN):  acetaminophen   Tablet .. 650 milliGRAM(s) Oral every 6 hours PRN Temp greater or equal to 38C (100.4F), Mild Pain (1 - 3)  ALBUTerol    0.083% 2.5 milliGRAM(s) Nebulizer every 6 hours PRN Shortness of Breath and/or Wheezing  dextrose 40% Gel 15 Gram(s) Oral once PRN Blood Glucose LESS THAN 70 milliGRAM(s)/deciliter  glucagon  Injectable 1 milliGRAM(s) IntraMuscular once PRN Glucose LESS THAN 70 milligrams/deciliter  morphine  - Injectable 1 milliGRAM(s) IV Push every 4 hours PRN Severe Pain (7 - 10)  ondansetron    Tablet 4 milliGRAM(s) Oral every 12 hours PRN Nausea  simethicone 80 milliGRAM(s) Chew two times a day PRN Gas    Pertinent Labs: 01-15 Na142 mmol/L Glu 143 mg/dL<H> K+ 3.3 mmol/L<L> Cr  0.34 mg/dL<L> BUN 10 mg/dL 01-15 Phos 2.6 mg/dL 01-15 Alb 2.2 g/dL<L>     CAPILLARY BLOOD GLUCOSE      POCT Blood Glucose.: 142 mg/dL (15 Mauricio 2020 07:41)  POCT Blood Glucose.: 150 mg/dL (14 Jan 2020 22:15)  POCT Blood Glucose.: 141 mg/dL (14 Jan 2020 18:20)    Skin:  L gluteal fold, sacral stage II  Edema: 1+ general    Estimated Needs:   [x] no change since previous assessment  [ ] recalculated:     Previous Nutrition Diagnosis:    [x] Malnutrition     Nutrition Diagnosis is [x] ongoing  [ ] resolved [ ] not applicable     New Nutrition Diagnosis: [x] not applicable       Interventions:   Recommend  [ ] Change Diet To:  [ ] Nutrition Supplement  [ ] Nutrition Support  [x] Other: continue lacto-ovo vegetarian diet, honor food preferences, encourage po intake, recommend daily MVI, Vit C 500mg bid    Monitoring and Evaluation:   [x] PO intake [ x ] Tolerance to diet prescription [ x ] weights [ x ] labs[ x ] follow up per protocol  [x] other: bowel function, s/s GI distress, skin integrity

## 2020-01-15 NOTE — PROGRESS NOTE ADULT - SUBJECTIVE AND OBJECTIVE BOX
Neurology follow up note    ROMIE [id number]      Interval History:    Patient feels ok no new complaints.    MEDICATIONS    acetaminophen   Tablet .. 650 milliGRAM(s) Oral every 6 hours PRN  ALBUTerol    0.083% 2.5 milliGRAM(s) Nebulizer every 6 hours PRN  dexMEDEtomidine Infusion 0.3 MICROgram(s)/kG/Hr IV Continuous <Continuous>  dextrose 40% Gel 15 Gram(s) Oral once PRN  dextrose 5%. 1000 milliLiter(s) IV Continuous <Continuous>  dextrose 50% Injectable 12.5 Gram(s) IV Push once  dextrose 50% Injectable 25 Gram(s) IV Push once  dextrose 50% Injectable 25 Gram(s) IV Push once  enoxaparin Injectable 40 milliGRAM(s) SubCutaneous daily  fluconAZOLE   Tablet 100 milliGRAM(s) Oral daily  glucagon  Injectable 1 milliGRAM(s) IntraMuscular once PRN  hydrocortisone 2.5% Rectal Cream 1 Application(s) Rectal two times a day  hydrocortisone hemorrhoidal Suppository 1 Suppository(s) Rectal two times a day  influenza   Vaccine 0.5 milliLiter(s) IntraMuscular once  insulin lispro (HumaLOG) corrective regimen sliding scale   SubCutaneous three times a day before meals  insulin lispro (HumaLOG) corrective regimen sliding scale   SubCutaneous at bedtime  lactobacillus acidophilus 1 Tablet(s) Oral three times a day with meals  lidocaine 2% Gel 1 Application(s) Topical three times a day  mesalamine Suppository 1000 milliGRAM(s) Rectal at bedtime  methylPREDNISolone sodium succinate IVPB 500 milliGRAM(s) IV Intermittent every 12 hours  morphine  - Injectable 1 milliGRAM(s) IV Push every 4 hours PRN  morphine  - Injectable 1 milliGRAM(s) IV Push once  ondansetron    Tablet 4 milliGRAM(s) Oral every 12 hours PRN  OXcarbazepine 300 milliGRAM(s) Oral two times a day  pantoprazole    Tablet 40 milliGRAM(s) Oral before breakfast  potassium phosphate / sodium phosphate powder 1 Packet(s) Oral three times a day  propranolol 20 milliGRAM(s) Oral three times a day  simethicone 80 milliGRAM(s) Chew two times a day PRN  sucralfate 1 Gram(s) Oral two times a day  tiotropium 18 MICROgram(s) Capsule 1 Capsule(s) Inhalation daily  witch hazel Pads 1 Application(s) Topical three times a day  zinc oxide 20% Ointment 1 Application(s) Topical two times a day      Allergies    IV Contrast (Anaphylaxis)  shellfish. (Anaphylaxis)    Intolerances            Vital Signs Last 24 Hrs  T(C): 36.6 (15 Mauricio 2020 11:45), Max: 36.7 (15 Mauricio 2020 03:26)  T(F): 97.8 (15 Mauricio 2020 11:45), Max: 98 (15 Mauricio 2020 03:26)  HR: 96 (15 Mauricio 2020 11:00) (66 - 105)  BP: 163/100 (15 Mauricio 2020 11:00) (113/68 - 171/96)  BP(mean): 126 (15 Mauricio 2020 11:00) (86 - 127)  RR: 33 (15 Mauricio 2020 11:00) (22 - 44)  SpO2: 95% (15 Mauricio 2020 11:00) (92% - 100%)    REVIEW OF SYSTEMS:  Constitutional:  The patient denies fever, chills, or night sweats.  Head:  Occ headaches.  Eyes:  No double vision or blurry vision.  Ears:  No ringing in the ears.  Neck:  No neck pain.  Respiratory:  Occasional cough with shortness of breath.  Cardiovascular:  no chest pain.  Abdomen:  occ nausea,  no vomiting, or abdominal pain.  Extremities/Neurological:  No numbness or tingling.  Musculoskeletal:  Occasional joint pain.    PHYSICAL EXAMINATION:   HEENT:  Head:  Normocephalic, atraumatic.  Eyes:  No scleral icterus.  Ears:  Hearing bilaterally appeared to be intact.  NECK:  Supple.  RESPIRATORY:  Decreased breath sounds bilaterally, but most prominent on the right.  CARDIOVASCULAR:  S1 and S2 heard.  ABDOMEN:  Soft and nontender.  Extremities:  No clubbing or cyanosis were noted.      NEUROLOGIC:  The patient is awake and alert.    Extraocular movements were intact.  Pupils were equal, round, and reactive bilaterally 3 mm to 2 mm.  Speech was fluent.  Smile was symmetric.  Motor:  Right upper was 5/5, left upper  arm brace was 3/5 decrease rom hand and finger in flexed position , right lower was 4/5, left lower was 3+/5.  As per my conversation with the spouse, after her apparent event back in March, questionable seizure versus questionable stroke.  She was left with left-sided weakness.  Sensory:  Bilateral upper and lower appeared intact to light touch.                  LABS:  CBC Full  -  ( 15 Mauricio 2020 05:19 )  WBC Count : 8.54 K/uL  RBC Count : 3.54 M/uL  Hemoglobin : 9.9 g/dL  Hematocrit : 31.8 %  Platelet Count - Automated : 616 K/uL  Mean Cell Volume : 89.8 fl  Mean Cell Hemoglobin : 28.0 pg  Mean Cell Hemoglobin Concentration : 31.1 gm/dL  Auto Neutrophil # : 7.72 K/uL  Auto Lymphocyte # : 0.45 K/uL  Auto Monocyte # : 0.29 K/uL  Auto Eosinophil # : 0.00 K/uL  Auto Basophil # : 0.00 K/uL  Auto Neutrophil % : 90.4 %  Auto Lymphocyte % : 5.3 %  Auto Monocyte % : 3.4 %  Auto Eosinophil % : 0.0 %  Auto Basophil % : 0.0 %      01-15    142  |  96  |  10  ----------------------------<  143<H>  3.3<L>   |  42<H>  |  0.34<L>    Ca    8.8      15 Mauricio 2020 05:19  Phos  2.6     01-15  Mg     2.1     01-15    TPro  5.7<L>  /  Alb  2.2<L>  /  TBili  0.1<L>  /  DBili  x   /  AST  21  /  ALT  26  /  AlkPhos  76  01-15    Hemoglobin A1C:     LIVER FUNCTIONS - ( 15 Mauricio 2020 05:19 )  Alb: 2.2 g/dL / Pro: 5.7 g/dL / ALK PHOS: 76 U/L / ALT: 26 U/L / AST: 21 U/L / GGT: x           Vitamin B12         RADIOLOGY      ANALYSIS AND PLAN:  This is a 59-year-old with a history of possibly epilepsy verse TIAs, history of chronic subdural hydromas and change in mental status.    1.	For episode of change in mental status, as per my conversation with the spouse, these appear to occur primarily at the same time at night for the last three to four weeks.  The patient has been on Trileptal for about eight to nine months.  Suspect less likely this is Trileptal, Questionable, the patient could have any type of sleep-related disorder causing these events to occur at night.  I spoke with the spouse, the patient should undergo sleep studies.  2.	For history of chronic subdural hematoma, these appeared to have resolved from previous MRI.  3.	For history of possible underlying epilepsy, for now, I will continue the patient on her Trileptal..  4.	Monitor CO2 levels as needed and respiratory status   5.	spoke outside neurologist Dr. Wallace in past agrees to continue trileptal for now  His telephone number is [phone].  6.	Spouse's name is Marialuisa, his telephone number is [phone] spoke to him at bedside 1/13/2020  7.	steroids as needed   8.	H/O R pigtail placed by thoracic surgery   9.	no new events   10.	seen with daughter today 1/14/2020  11.	Greater than 20 minutes of time was spent with the patient, plan of care, reviewing data, speaking to the family and multidisciplinary healthcare team

## 2020-01-15 NOTE — PROGRESS NOTE ADULT - SUBJECTIVE AND OBJECTIVE BOX
Date/Time Patient Seen:  		  Referring MD:   Data Reviewed	       Patient is a 59y old  Female who presents with a chief complaint of pneumothorax, colitis, UTI (15 Mauricio 2020 09:24)      Subjective/HPI     PAST MEDICAL & SURGICAL HISTORY:  Interstitial lung disease: on home o2 prn  NHL (non-Hodgkin's lymphoma): Agem 43 sp chemo/rt/stem cell  Transient cerebral ischemia, unspecified type  Mitral prolapse  Pulmonary disease  History of tonsillectomy  History of appendectomy        Medication list         MEDICATIONS  (STANDING):  dexMEDEtomidine Infusion 0.3 MICROgram(s)/kG/Hr (4.08 mL/Hr) IV Continuous <Continuous>  dextrose 5%. 1000 milliLiter(s) (50 mL/Hr) IV Continuous <Continuous>  dextrose 50% Injectable 12.5 Gram(s) IV Push once  dextrose 50% Injectable 25 Gram(s) IV Push once  dextrose 50% Injectable 25 Gram(s) IV Push once  enoxaparin Injectable 40 milliGRAM(s) SubCutaneous daily  fluconAZOLE   Tablet 100 milliGRAM(s) Oral daily  hydrocortisone 2.5% Rectal Cream 1 Application(s) Rectal two times a day  hydrocortisone hemorrhoidal Suppository 1 Suppository(s) Rectal two times a day  influenza   Vaccine 0.5 milliLiter(s) IntraMuscular once  insulin lispro (HumaLOG) corrective regimen sliding scale   SubCutaneous three times a day before meals  insulin lispro (HumaLOG) corrective regimen sliding scale   SubCutaneous at bedtime  lactobacillus acidophilus 1 Tablet(s) Oral three times a day with meals  lidocaine 2% Gel 1 Application(s) Topical three times a day  mesalamine Suppository 1000 milliGRAM(s) Rectal at bedtime  methylPREDNISolone sodium succinate IVPB 500 milliGRAM(s) IV Intermittent every 12 hours  morphine  - Injectable 1 milliGRAM(s) IV Push once  OXcarbazepine 300 milliGRAM(s) Oral two times a day  pantoprazole    Tablet 40 milliGRAM(s) Oral before breakfast  potassium phosphate / sodium phosphate powder 1 Packet(s) Oral three times a day  propranolol 20 milliGRAM(s) Oral three times a day  sucralfate 1 Gram(s) Oral two times a day  tiotropium 18 MICROgram(s) Capsule 1 Capsule(s) Inhalation daily  witch hazel Pads 1 Application(s) Topical three times a day  zinc oxide 20% Ointment 1 Application(s) Topical two times a day    MEDICATIONS  (PRN):  acetaminophen   Tablet .. 650 milliGRAM(s) Oral every 6 hours PRN Temp greater or equal to 38C (100.4F), Mild Pain (1 - 3)  ALBUTerol    0.083% 2.5 milliGRAM(s) Nebulizer every 6 hours PRN Shortness of Breath and/or Wheezing  dextrose 40% Gel 15 Gram(s) Oral once PRN Blood Glucose LESS THAN 70 milliGRAM(s)/deciliter  glucagon  Injectable 1 milliGRAM(s) IntraMuscular once PRN Glucose LESS THAN 70 milligrams/deciliter  morphine  - Injectable 1 milliGRAM(s) IV Push every 4 hours PRN Severe Pain (7 - 10)  ondansetron    Tablet 4 milliGRAM(s) Oral every 12 hours PRN Nausea  simethicone 80 milliGRAM(s) Chew two times a day PRN Gas         Vitals log        ICU Vital Signs Last 24 Hrs  T(C): 36.5 (15 Mauricio 2020 07:26), Max: 36.7 (15 Mauricio 2020 03:26)  T(F): 97.7 (15 Mauricio 2020 07:26), Max: 98 (15 Mauricio 2020 03:26)  HR: 82 (15 Mauricio 2020 09:00) (66 - 105)  BP: 156/101 (15 Mauricio 2020 09:00) (109/69 - 171/96)  BP(mean): 123 (15 Mauricio 2020 09:00) (82 - 127)  ABP: --  ABP(mean): --  RR: 32 (15 Mauricio 2020 09:00) (21 - 44)  SpO2: 94% (15 Mauricio 2020 09:00) (92% - 100%)           Input and Output:  I&O's Detail    14 Jan 2020 07:01  -  15 Mauricio 2020 07:00  --------------------------------------------------------  IN:    dexmedetomidine Infusion: 260.4 mL    lactated ringers.: 600 mL    Oral Fluid: 120 mL    Solution: 100 mL  Total IN: 1080.4 mL    OUT:    Chest Tube: 60 mL    Voided: 1900 mL  Total OUT: 1960 mL    Total NET: -879.6 mL          Lab Data                        9.9    8.54  )-----------( 616      ( 15 Mauricio 2020 05:19 )             31.8     01-15    142  |  96  |  10  ----------------------------<  143<H>  3.3<L>   |  42<H>  |  0.34<L>    Ca    8.8      15 Mauricio 2020 05:19  Phos  2.6     01-15  Mg     2.1     01-15    TPro  5.7<L>  /  Alb  2.2<L>  /  TBili  0.1<L>  /  DBili  x   /  AST  21  /  ALT  26  /  AlkPhos  76  01-15            Review of Systems	      Objective     Physical Examination    heart s1s2  lung dec BS  abd soft      Pertinent Lab findings & Imaging      Shauna:  NO   Adequate UO     I&O's Detail    14 Jan 2020 07:01  -  15 Jan 2020 07:00  --------------------------------------------------------  IN:    dexmedetomidine Infusion: 260.4 mL    lactated ringers.: 600 mL    Oral Fluid: 120 mL    Solution: 100 mL  Total IN: 1080.4 mL    OUT:    Chest Tube: 60 mL    Voided: 1900 mL  Total OUT: 1960 mL    Total NET: -879.6 mL               Discussed with:     Cultures:	        Radiology

## 2020-01-15 NOTE — PROGRESS NOTE ADULT - SUBJECTIVE AND OBJECTIVE BOX
INTERVAL HPI/OVERNIGHT EVENTS:  pt seen and examined  remains on high flow    MEDICATIONS  (STANDING):  dexMEDEtomidine Infusion 0.3 MICROgram(s)/kG/Hr (4.08 mL/Hr) IV Continuous <Continuous>  enoxaparin Injectable 40 milliGRAM(s) SubCutaneous daily  hydrocortisone 2.5% Rectal Cream 1 Application(s) Rectal two times a day  hydrocortisone hemorrhoidal Suppository 1 Suppository(s) Rectal two times a day  influenza   Vaccine 0.5 milliLiter(s) IntraMuscular once  lactated ringers. 1000 milliLiter(s) (100 mL/Hr) IV Continuous <Continuous>  lactobacillus acidophilus 1 Tablet(s) Oral three times a day with meals  lidocaine 2% Gel 1 Application(s) Topical three times a day  mesalamine Suppository 1000 milliGRAM(s) Rectal at bedtime  methylPREDNISolone sodium succinate IVPB 500 milliGRAM(s) IV Intermittent every 12 hours  morphine  - Injectable 1 milliGRAM(s) IV Push once  norepinephrine Infusion 0.05 MICROgram(s)/kG/Min (5.1 mL/Hr) IV Continuous <Continuous>  OXcarbazepine 300 milliGRAM(s) Oral two times a day  pantoprazole    Tablet 40 milliGRAM(s) Oral before breakfast  piperacillin/tazobactam IVPB.. 3.375 Gram(s) IV Intermittent every 8 hours  potassium phosphate / sodium phosphate powder 1 Packet(s) Oral three times a day  sucralfate 1 Gram(s) Oral two times a day  tiotropium 18 MICROgram(s) Capsule 1 Capsule(s) Inhalation daily  witch hazel Pads 1 Application(s) Topical three times a day  zinc oxide 20% Ointment 1 Application(s) Topical two times a day    MEDICATIONS  (PRN):  acetaminophen   Tablet .. 650 milliGRAM(s) Oral every 6 hours PRN Temp greater or equal to 38C (100.4F), Mild Pain (1 - 3)  ALBUTerol    0.083% 2.5 milliGRAM(s) Nebulizer every 6 hours PRN Shortness of Breath and/or Wheezing  morphine  - Injectable 1 milliGRAM(s) IV Push every 4 hours PRN Severe Pain (7 - 10)  ondansetron    Tablet 4 milliGRAM(s) Oral every 12 hours PRN Nausea  simethicone 80 milliGRAM(s) Chew two times a day PRN Gas      Allergies    IV Contrast (Anaphylaxis)  shellfish. (Anaphylaxis)    Intolerances        Review of Systems:    unable to obtain      Vital Signs Last 24 Hrs  T(C): 36.8 (14 Jan 2020 08:04), Max: 37.1 (13 Jan 2020 23:40)  T(F): 98.2 (14 Jan 2020 08:04), Max: 98.8 (13 Jan 2020 23:40)  HR: 99 (14 Jan 2020 08:12) (82 - 120)  BP: 158/81 (14 Jan 2020 07:00) (102/59 - 179/92)  BP(mean): 112 (14 Jan 2020 07:00) (74 - 155)  RR: 30 (14 Jan 2020 08:12) (22 - 45)  SpO2: 97% (14 Jan 2020 08:12) (86% - 100%)    PHYSICAL EXAM:  Constitutional: lying in bed on hi flow  HEENT: ncat  Neck: No LAD  Gastrointestinal: soft nt nd  Extremities: No peripheral edema  Neurological: awake alert but confused      LABS:                        10.8   9.59  )-----------( 607      ( 14 Jan 2020 05:41 )             35.2     01-14    142  |  97  |  6<L>  ----------------------------<  144<H>  3.9   |  39<H>  |  0.38<L>    Ca    9.3      14 Jan 2020 05:41  Phos  2.4     01-14  Mg     2.1     01-14    TPro  6.0  /  Alb  2.4<L>  /  TBili  0.2  /  DBili  x   /  AST  20  /  ALT  24  /  AlkPhos  87  01-14          RADIOLOGY & ADDITIONAL TESTS:

## 2020-01-15 NOTE — PROGRESS NOTE ADULT - SUBJECTIVE AND OBJECTIVE BOX
Patient is a 59y old  Female who presents with a chief complaint of pneumothorax, colitis, UTI (15 Mauricio 2020 09:40)    24 hour events: no acute overnight events. Delirium decreased overnight as per daughter at bedside. Pt is doing well today. Speaking in full sentences, alert. Pt is mentating well and has more energy. States she would like to try eat more today. Reports breathing is better.     REVIEW OF SYSTEMS  Constitutional: No fever, chills  Neuro: No headache, continued L arm weakness from prior CVA  Resp: No cough, wheezing, improved SOB  CVS: Admits to  right side shooting from chest tube, Denies palpitations  GI: No abdominal pain, nausea, vomiting   : No dysuria, frequency, incontinence  Skin: No itching, burning, rashes, or lesions   Psych: No anxiety, hallucinations  Heme: No bleeding      T(F): 97.8 (01-15-20 @ 11:45), Max: 98 (01-15-20 @ 03:26)  HR: 96 (01-15-20 @ 11:00) (66 - 105)  BP: 163/100 (01-15-20 @ 11:00) (113/68 - 171/96)  RR: 33 (01-15-20 @ 11:00) (22 - 44)  SpO2: 95% (01-15-20 @ 11:00) (92% - 100%)  Wt(kg): --            I&O's Summary    01-14 @ 07:01  -  01-15 @ 07:00  --------------------------------------------------------  IN: 1080.4 mL / OUT: 1960 mL / NET: -879.6 mL    01-15 @ 07:01  -  01-15 @ 14:00  --------------------------------------------------------  IN: 268.8 mL / OUT: 1250 mL / NET: -981.2 mL      PHYSICAL EXAM  General: frail and cachectic chronically ill appearing female in NAD  CNS: awake, alert, L sided weakness residual  Resp: crackles b/l lungs decreased from yesterday R>L , +R chest tube (minimal serosanguinous drainage)  CVS: regular, no murmur  Abd: soft, NT, ND  Ext: trace ankle edema, no cyanosis  Skin: warm and well perfused  Neuro: delirium seems to have resolved, pt is mentating well, appropriate affect     MEDICATIONS  fluconAZOLE   Tablet Oral    propranolol Oral    dextrose 40% Gel Oral PRN  dextrose 50% Injectable IV Push  dextrose 50% Injectable IV Push  dextrose 50% Injectable IV Push  glucagon  Injectable IntraMuscular PRN  insulin lispro (HumaLOG) corrective regimen sliding scale SubCutaneous  insulin lispro (HumaLOG) corrective regimen sliding scale SubCutaneous  methylPREDNISolone sodium succinate IVPB IV Intermittent    ALBUTerol    0.083% Nebulizer PRN  tiotropium 18 MICROgram(s) Capsule Inhalation    acetaminophen   Tablet .. Oral PRN  dexMEDEtomidine Infusion IV Continuous  morphine  - Injectable IV Push PRN  morphine  - Injectable IV Push  ondansetron    Tablet Oral PRN  OXcarbazepine Oral      enoxaparin Injectable SubCutaneous    mesalamine Suppository Rectal  pantoprazole    Tablet Oral  simethicone Chew PRN  sucralfate Oral      dextrose 5%. IV Continuous  potassium phosphate / sodium phosphate powder Oral    influenza   Vaccine IntraMuscular    hydrocortisone 2.5% Rectal Cream Rectal  hydrocortisone hemorrhoidal Suppository Rectal  lidocaine 2% Gel Topical  witch hazel Pads Topical  zinc oxide 20% Ointment Topical    lactobacillus acidophilus Oral                          9.9    8.54  )-----------( 616      ( 15 Mauricio 2020 05:19 )             31.8       01-15    142  |  96  |  10  ----------------------------<  143<H>  3.3<L>   |  42<H>  |  0.34<L>    Ca    8.8      15 Mauricio 2020 05:19  Phos  2.6     01-15  Mg     2.1     01-15    TPro  5.7<L>  /  Alb  2.2<L>  /  TBili  0.1<L>  /  DBili  x   /  AST  21  /  ALT  26  /  AlkPhos  76  01-15                    Radiology: Chest Xray pending     CENTRAL LINE: N  LUKE: N  A-LINE: N    GLOBAL ISSUE/BEST PRACTICE:  Analgesia: Y  Sedation:  Y  HOB elevation: yes  Stress ulcer prophylaxis: Y  VTE prophylaxis: Y  Glycemic control: Y  Nutrition: Y    CODE STATUS: FULL

## 2020-01-15 NOTE — PROGRESS NOTE ADULT - PROBLEM SELECTOR PLAN 1
completed ABX  on Anti Fungal  on pulse dose steroids - would taper and stop  on high flow NC -   I and O  ID follow up reviewed  prognosis very poor  doubt there will be any meaningful recovery -   GOC discussion   serial CXR noted - ? whether PTX resolved - may need repeat CT chest - however - overall prognosis poor and there are limited options for tx and eval  will follow  ICU management and support  full code  ILD with progression and resp distress

## 2020-01-15 NOTE — PROGRESS NOTE ADULT - SUBJECTIVE AND OBJECTIVE BOX
infectious diseases progress note:    ROMIE VIRAMONTES is a 59y y. o. Female patient    Patient reports: I can not [id number] how early it is    ROS:    EYES:  Negative  blurry vision or double vision  GASTROINTESTINAL:  Negative for nausea, vomiting, diarrhea  -otherwise negative except for subjective    Allergies    IV Contrast (Anaphylaxis)  shellfish. (Anaphylaxis)    Intolerances        ANTIBIOTICS/RELEVANT:  antimicrobials  fluconAZOLE   Tablet 100 milliGRAM(s) Oral daily    immunologic:  influenza   Vaccine 0.5 milliLiter(s) IntraMuscular once    OTHER:  acetaminophen   Tablet .. 650 milliGRAM(s) Oral every 6 hours PRN  ALBUTerol    0.083% 2.5 milliGRAM(s) Nebulizer every 6 hours PRN  dexMEDEtomidine Infusion 0.3 MICROgram(s)/kG/Hr IV Continuous <Continuous>  dextrose 40% Gel 15 Gram(s) Oral once PRN  dextrose 5%. 1000 milliLiter(s) IV Continuous <Continuous>  dextrose 50% Injectable 12.5 Gram(s) IV Push once  dextrose 50% Injectable 25 Gram(s) IV Push once  dextrose 50% Injectable 25 Gram(s) IV Push once  enoxaparin Injectable 40 milliGRAM(s) SubCutaneous daily  glucagon  Injectable 1 milliGRAM(s) IntraMuscular once PRN  hydrocortisone 2.5% Rectal Cream 1 Application(s) Rectal two times a day  hydrocortisone hemorrhoidal Suppository 1 Suppository(s) Rectal two times a day  insulin lispro (HumaLOG) corrective regimen sliding scale   SubCutaneous three times a day before meals  insulin lispro (HumaLOG) corrective regimen sliding scale   SubCutaneous at bedtime  lactobacillus acidophilus 1 Tablet(s) Oral three times a day with meals  lidocaine 2% Gel 1 Application(s) Topical three times a day  mesalamine Suppository 1000 milliGRAM(s) Rectal at bedtime  methylPREDNISolone sodium succinate IVPB 500 milliGRAM(s) IV Intermittent every 12 hours  morphine  - Injectable 1 milliGRAM(s) IV Push every 4 hours PRN  morphine  - Injectable 1 milliGRAM(s) IV Push once  ondansetron    Tablet 4 milliGRAM(s) Oral every 12 hours PRN  OXcarbazepine 300 milliGRAM(s) Oral two times a day  pantoprazole    Tablet 40 milliGRAM(s) Oral before breakfast  potassium phosphate / sodium phosphate powder 1 Packet(s) Oral three times a day  propranolol 20 milliGRAM(s) Oral three times a day  simethicone 80 milliGRAM(s) Chew two times a day PRN  sucralfate 1 Gram(s) Oral two times a day  tiotropium 18 MICROgram(s) Capsule 1 Capsule(s) Inhalation daily  witch hazel Pads 1 Application(s) Topical three times a day  zinc oxide 20% Ointment 1 Application(s) Topical two times a day      Objective:  Last 24-Vital Signs Last 24 Hrs  T(C): 36.5 (15 Mauricio 2020 07:26), Max: 36.7 (15 Mauricio 2020 03:26)  T(F): 97.7 (15 Mauricio 2020 07:26), Max: 98 (15 Mauricio 2020 03:26)  HR: 74 (15 Mauricio 2020 07:00) (66 - 105)  BP: 162/89 (15 Mauricio 2020 07:00) (109/69 - 171/96)  BP(mean): 120 (15 Mauricio 2020 07:00) (82 - 127)  RR: 26 (15 Mauricio 2020 07:00) (21 - 44)  SpO2: 97% (15 Mauricio 2020 07:00) (92% - 100%)    T(C): 36.5 (01-15-20 @ 07:26), Max: 37.1 (01-13-20 @ 23:40)  T(F): 97.7 (01-15-20 @ 07:26), Max: 98.8 (01-13-20 @ 23:40)  T(C): 36.5 (01-15-20 @ 07:26), Max: 37.1 (01-13-20 @ 23:40)  T(F): 97.7 (01-15-20 @ 07:26), Max: 98.8 (01-13-20 @ 23:40)  T(C): 36.5 (01-15-20 @ 07:26), Max: 37.6 (01-11-20 @ 16:00)  T(F): 97.7 (01-15-20 @ 07:26), Max: 99.7 (01-11-20 @ 16:00)    PHYSICAL EXAM:  Constitutional: Well-developed, well nourished  Eyes: PERRLA, EOMI  Ear/Nose/Throat: oropharynx normal	  Neck: no JVD, no lymphadenopathy, supple  Respiratory: no accessory muscle use, lung fields bilaterally clear  Cardiovascular: RRR, normal S1, S2 no m/r/g  Gastrointestinal: soft, NT, no HSM, BS-normal  Extremities: no clubbing, no cyanosis, edema absent  Neuro: patient alert, oriented and appropriate  Skin: no sig lesions      LABS:                        9.9    8.54  )-----------( 616      ( 15 Mauricio 2020 05:19 )             31.8       WBC 8.54  01-15 @ 05:19  WBC 9.59  01-14 @ 05:41  WBC 12.57  01-13 @ 05:28  WBC 17.94  01-12 @ 05:40  WBC 8.62  01-11 @ 05:54  WBC 9.27  01-10 @ 05:36  WBC 9.16  01-09 @ 05:38      01-15    142  |  96  |  10  ----------------------------<  143<H>  3.3<L>   |  42<H>  |  0.34<L>    Ca    8.8      15 Mauricio 2020 05:19  Phos  2.6     01-15  Mg     2.1     01-15    TPro  5.7<L>  /  Alb  2.2<L>  /  TBili  0.1<L>  /  DBili  x   /  AST  21  /  ALT  26  /  AlkPhos  76  01-15      Creatinine, Serum: 0.34 mg/dL (01-15-20 @ 05:19)  Creatinine, Serum: 0.38 mg/dL (01-14-20 @ 05:41)  Creatinine, Serum: 0.41 mg/dL (01-13-20 @ 05:28)  Creatinine, Serum: 0.40 mg/dL (01-12-20 @ 05:40)  Creatinine, Serum: 0.54 mg/dL (01-11-20 @ 15:10)  Creatinine, Serum: 0.35 mg/dL (01-11-20 @ 05:54)  Creatinine, Serum: 0.47 mg/dL (01-10-20 @ 05:36)  Creatinine, Serum: 0.63 mg/dL (01-09-20 @ 05:38)                MICROBIOLOGY:              RADIOLOGY & ADDITIONAL STUDIES:

## 2020-01-15 NOTE — PROGRESS NOTE ADULT - PROBLEM SELECTOR PLAN 4
per ICU  Thank you for consulting us and involving us in the management of this most interesting and challenging case.     Please Call with any further questions

## 2020-01-16 DIAGNOSIS — R13.10 DYSPHAGIA, UNSPECIFIED: ICD-10-CM

## 2020-01-16 LAB
ALBUMIN SERPL ELPH-MCNC: 2.4 G/DL — LOW (ref 3.3–5)
ALP SERPL-CCNC: 80 U/L — SIGNIFICANT CHANGE UP (ref 40–120)
ALT FLD-CCNC: 27 U/L — SIGNIFICANT CHANGE UP (ref 12–78)
ANION GAP SERPL CALC-SCNC: 6 MMOL/L — SIGNIFICANT CHANGE UP (ref 5–17)
AST SERPL-CCNC: 19 U/L — SIGNIFICANT CHANGE UP (ref 15–37)
BASOPHILS # BLD AUTO: 0.01 K/UL — SIGNIFICANT CHANGE UP (ref 0–0.2)
BASOPHILS NFR BLD AUTO: 0.1 % — SIGNIFICANT CHANGE UP (ref 0–2)
BILIRUB SERPL-MCNC: 0.2 MG/DL — SIGNIFICANT CHANGE UP (ref 0.2–1.2)
BUN SERPL-MCNC: 14 MG/DL — SIGNIFICANT CHANGE UP (ref 7–23)
CALCIUM SERPL-MCNC: 9 MG/DL — SIGNIFICANT CHANGE UP (ref 8.5–10.1)
CHLORIDE SERPL-SCNC: 94 MMOL/L — LOW (ref 96–108)
CO2 SERPL-SCNC: 41 MMOL/L — HIGH (ref 22–31)
CREAT SERPL-MCNC: 0.37 MG/DL — LOW (ref 0.5–1.3)
EOSINOPHIL # BLD AUTO: 0 K/UL — SIGNIFICANT CHANGE UP (ref 0–0.5)
EOSINOPHIL NFR BLD AUTO: 0 % — SIGNIFICANT CHANGE UP (ref 0–6)
GLUCOSE SERPL-MCNC: 153 MG/DL — HIGH (ref 70–99)
HCT VFR BLD CALC: 34.4 % — LOW (ref 34.5–45)
HGB BLD-MCNC: 10.9 G/DL — LOW (ref 11.5–15.5)
IMM GRANULOCYTES NFR BLD AUTO: 1.8 % — HIGH (ref 0–1.5)
LYMPHOCYTES # BLD AUTO: 0.48 K/UL — LOW (ref 1–3.3)
LYMPHOCYTES # BLD AUTO: 5.7 % — LOW (ref 13–44)
MAGNESIUM SERPL-MCNC: 2.1 MG/DL — SIGNIFICANT CHANGE UP (ref 1.6–2.6)
MCHC RBC-ENTMCNC: 28.2 PG — SIGNIFICANT CHANGE UP (ref 27–34)
MCHC RBC-ENTMCNC: 31.7 GM/DL — LOW (ref 32–36)
MCV RBC AUTO: 89.1 FL — SIGNIFICANT CHANGE UP (ref 80–100)
MONOCYTES # BLD AUTO: 0.26 K/UL — SIGNIFICANT CHANGE UP (ref 0–0.9)
MONOCYTES NFR BLD AUTO: 3.1 % — SIGNIFICANT CHANGE UP (ref 2–14)
NEUTROPHILS # BLD AUTO: 7.46 K/UL — HIGH (ref 1.8–7.4)
NEUTROPHILS NFR BLD AUTO: 89.3 % — HIGH (ref 43–77)
NRBC # BLD: 0 /100 WBCS — SIGNIFICANT CHANGE UP (ref 0–0)
PHOSPHATE SERPL-MCNC: 2.8 MG/DL — SIGNIFICANT CHANGE UP (ref 2.5–4.5)
PLATELET # BLD AUTO: 650 K/UL — HIGH (ref 150–400)
POTASSIUM SERPL-MCNC: 3.2 MMOL/L — LOW (ref 3.5–5.3)
POTASSIUM SERPL-SCNC: 3.2 MMOL/L — LOW (ref 3.5–5.3)
PROT SERPL-MCNC: 6 G/DL — SIGNIFICANT CHANGE UP (ref 6–8.3)
RBC # BLD: 3.86 M/UL — SIGNIFICANT CHANGE UP (ref 3.8–5.2)
RBC # FLD: 14.3 % — SIGNIFICANT CHANGE UP (ref 10.3–14.5)
SODIUM SERPL-SCNC: 141 MMOL/L — SIGNIFICANT CHANGE UP (ref 135–145)
WBC # BLD: 8.36 K/UL — SIGNIFICANT CHANGE UP (ref 3.8–10.5)
WBC # FLD AUTO: 8.36 K/UL — SIGNIFICANT CHANGE UP (ref 3.8–10.5)

## 2020-01-16 PROCEDURE — 99233 SBSQ HOSP IP/OBS HIGH 50: CPT

## 2020-01-16 PROCEDURE — 71045 X-RAY EXAM CHEST 1 VIEW: CPT | Mod: 26,77

## 2020-01-16 PROCEDURE — 71045 X-RAY EXAM CHEST 1 VIEW: CPT | Mod: 26

## 2020-01-16 PROCEDURE — 99232 SBSQ HOSP IP/OBS MODERATE 35: CPT

## 2020-01-16 RX ORDER — DIPHENHYDRAMINE HYDROCHLORIDE AND LIDOCAINE HYDROCHLORIDE AND ALUMINUM HYDROXIDE AND MAGNESIUM HYDRO
5 KIT
Refills: 0 | Status: DISCONTINUED | OUTPATIENT
Start: 2020-01-16 | End: 2020-01-24

## 2020-01-16 RX ORDER — NYSTATIN 500MM UNIT
500000 POWDER (EA) MISCELLANEOUS
Refills: 0 | Status: DISCONTINUED | OUTPATIENT
Start: 2020-01-16 | End: 2020-01-24

## 2020-01-16 RX ORDER — POTASSIUM CHLORIDE 20 MEQ
40 PACKET (EA) ORAL EVERY 4 HOURS
Refills: 0 | Status: COMPLETED | OUTPATIENT
Start: 2020-01-16 | End: 2020-01-16

## 2020-01-16 RX ORDER — DIAZEPAM 5 MG
2 TABLET ORAL EVERY 6 HOURS
Refills: 0 | Status: DISCONTINUED | OUTPATIENT
Start: 2020-01-16 | End: 2020-01-16

## 2020-01-16 RX ADMIN — ENOXAPARIN SODIUM 40 MILLIGRAM(S): 100 INJECTION SUBCUTANEOUS at 12:56

## 2020-01-16 RX ADMIN — Medication 500000 UNIT(S): at 17:21

## 2020-01-16 RX ADMIN — Medication 1 APPLICATION(S): at 22:21

## 2020-01-16 RX ADMIN — TIOTROPIUM BROMIDE 1 CAPSULE(S): 18 CAPSULE ORAL; RESPIRATORY (INHALATION) at 12:55

## 2020-01-16 RX ADMIN — LIDOCAINE 1 APPLICATION(S): 4 CREAM TOPICAL at 22:21

## 2020-01-16 RX ADMIN — LIDOCAINE 1 APPLICATION(S): 4 CREAM TOPICAL at 13:05

## 2020-01-16 RX ADMIN — Medication 1 SUPPOSITORY(S): at 17:21

## 2020-01-16 RX ADMIN — Medication 40 MILLIEQUIVALENT(S): at 13:04

## 2020-01-16 RX ADMIN — AER TRAVELER 1 APPLICATION(S): 0.5 SOLUTION RECTAL; TOPICAL at 13:05

## 2020-01-16 RX ADMIN — Medication 1 APPLICATION(S): at 12:49

## 2020-01-16 RX ADMIN — OXCARBAZEPINE 300 MILLIGRAM(S): 300 TABLET, FILM COATED ORAL at 22:20

## 2020-01-16 RX ADMIN — Medication 1000 MILLIGRAM(S): at 22:23

## 2020-01-16 RX ADMIN — Medication 1 TABLET(S): at 17:22

## 2020-01-16 RX ADMIN — Medication 1 TABLET(S): at 10:35

## 2020-01-16 RX ADMIN — Medication 1 GRAM(S): at 17:22

## 2020-01-16 RX ADMIN — DIPHENHYDRAMINE HYDROCHLORIDE AND LIDOCAINE HYDROCHLORIDE AND ALUMINUM HYDROXIDE AND MAGNESIUM HYDRO 5 MILLILITER(S): KIT at 17:21

## 2020-01-16 RX ADMIN — DEXMEDETOMIDINE HYDROCHLORIDE IN 0.9% SODIUM CHLORIDE 4.08 MICROGRAM(S)/KG/HR: 4 INJECTION INTRAVENOUS at 22:30

## 2020-01-16 RX ADMIN — Medication 1 PACKET(S): at 22:19

## 2020-01-16 RX ADMIN — AER TRAVELER 1 APPLICATION(S): 0.5 SOLUTION RECTAL; TOPICAL at 05:27

## 2020-01-16 RX ADMIN — Medication 60 MILLIGRAM(S): at 17:22

## 2020-01-16 RX ADMIN — PANTOPRAZOLE SODIUM 40 MILLIGRAM(S): 20 TABLET, DELAYED RELEASE ORAL at 12:56

## 2020-01-16 RX ADMIN — ZINC OXIDE 1 APPLICATION(S): 200 OINTMENT TOPICAL at 05:27

## 2020-01-16 RX ADMIN — DEXMEDETOMIDINE HYDROCHLORIDE IN 0.9% SODIUM CHLORIDE 4.08 MICROGRAM(S)/KG/HR: 4 INJECTION INTRAVENOUS at 04:19

## 2020-01-16 RX ADMIN — Medication 40 MILLIEQUIVALENT(S): at 10:35

## 2020-01-16 RX ADMIN — Medication 1 SUPPOSITORY(S): at 05:27

## 2020-01-16 RX ADMIN — AER TRAVELER 1 APPLICATION(S): 0.5 SOLUTION RECTAL; TOPICAL at 22:21

## 2020-01-16 RX ADMIN — Medication 1 MILLIGRAM(S): at 17:08

## 2020-01-16 RX ADMIN — LIDOCAINE 1 APPLICATION(S): 4 CREAM TOPICAL at 05:27

## 2020-01-16 RX ADMIN — OXCARBAZEPINE 300 MILLIGRAM(S): 300 TABLET, FILM COATED ORAL at 10:35

## 2020-01-16 RX ADMIN — ZINC OXIDE 1 APPLICATION(S): 200 OINTMENT TOPICAL at 17:26

## 2020-01-16 RX ADMIN — Medication 1 PACKET(S): at 13:04

## 2020-01-16 RX ADMIN — Medication 500000 UNIT(S): at 12:51

## 2020-01-16 NOTE — PROGRESS NOTE ADULT - PROBLEM SELECTOR PLAN 4
slp eval noted  cont dysphagia diet as tolerated  gerd/aspiration prec   cont diflucan  cont ppi  consider appetite stimulant to optimize po

## 2020-01-16 NOTE — SWALLOW BEDSIDE ASSESSMENT ADULT - SWALLOW EVAL: RECOMMENDED FEEDING/EATING TECHNIQUES
position upright (90 degrees)/oral hygiene/small sips/bites/allow for swallow between intakes/alternate food with liquid/crush medication (when feasible)/maintain upright posture during/after eating for 30 mins

## 2020-01-16 NOTE — PROGRESS NOTE ADULT - SUBJECTIVE AND OBJECTIVE BOX
CHIEF COMPLAINT/INTERVAL HISTORY:  Pt. seen and evaluated for septic shock and acute hypoxic respiratory failure 2/2 hospital acquired pneumonia and R. pneumothorax.  Pt. is in no distress.  Reports feeling better.  States respiratory status is slightly better today.      REVIEW OF SYSTEMS:  No fever, CP, or abdominal pain.     Vital Signs Last 24 Hrs  T(C): 36.4 (16 Jan 2020 07:34), Max: 36.6 (15 Mauricio 2020 11:45)  T(F): 97.6 (16 Jan 2020 07:34), Max: 97.8 (15 Mauricio 2020 11:45)  HR: 76 (16 Jan 2020 09:00) (63 - 98)  BP: 148/86 (16 Jan 2020 09:00) (115/68 - 165/100)  BP(mean): 109 (16 Jan 2020 09:00) (86 - 127)  RR: 20 (16 Jan 2020 09:00) (20 - 41)  SpO2: 96% (16 Jan 2020 09:00) (90% - 99%)    PHYSICAL EXAM:  GENERAL: NAD  HEENT: EOMI, hearing normal, conjunctiva and sclera clear, high flow NC in place  Chest: fine crackles bilaterally, R. chest tube  CV: S1S2, RRR,   GI: soft, +BS, NT/ND  Musculoskeletal: 1+ LE edema  Neuro: chronic weakness of LUE and contracture of left wrist  Psychiatric: affect nL, mood nL  Skin: warm and dry    LABS:                        10.9   8.36  )-----------( 650      ( 16 Jan 2020 05:43 )             34.4     01-16    141  |  94<L>  |  14  ----------------------------<  153<H>  3.2<L>   |  41<H>  |  0.37<L>    Ca    9.0      16 Jan 2020 05:43  Phos  2.8     01-16  Mg     2.1     01-16    TPro  6.0  /  Alb  2.4<L>  /  TBili  0.2  /  DBili  x   /  AST  19  /  ALT  27  /  AlkPhos  80  01-16        Assessment and Plan:  -Septic shock and acute hypoxic respiratory failure 2/2 hospital acquired pneumonia, right pneumothorax, and ILD:  Shock has resolved.  Off vasopressors.  Completed course of IV antibiotics and steroids.  Continue Spiriva inh daily, Albuterol Neb Q6h PRN, and O2 support.  Maintain chest tube.  ID, Pulmonary , Thoracic Surgery, and Intensivist f/u  -Colitis:  Completed course of IV antibiotics.  Continue mesalamine 1000mg FL QHS.  GI f/u  -Nausea and GERD:  Zofran 4mg PO Q12h PRN, Simethicone 80mg PO BID FL, Carafate 1gm PO BID, and Protonix 40mg IV daily.   -Seizure disorder:  continue Trileptal 300mg PO BID.  Neurology f/u  -VTE ppx: Lovenox 40mg SQ daily

## 2020-01-16 NOTE — PROGRESS NOTE ADULT - SUBJECTIVE AND OBJECTIVE BOX
Subjective:  oxygen continues to be weaned down   Patient subjectively feels about the same     Vital Signs:  Vital Signs Last 24 Hrs  T(C): 36.4 (01-16-20 @ 15:05), Max: 36.6 (01-15-20 @ 23:38)  T(F): 97.6 (01-16-20 @ 15:05), Max: 97.8 (01-15-20 @ 23:38)  HR: 75 (01-16-20 @ 15:00) (63 - 97)  BP: 145/89 (01-16-20 @ 15:00) (115/68 - 165/100)  RR: 28 (01-16-20 @ 15:00) (20 - 39)  SpO2: 96% (01-16-20 @ 15:00) (90% - 99%) on (O2)    Telemetry/Alarms:    Relevant labs, radiology and Medications reviewed                        10.9   8.36  )-----------( 650      ( 16 Jan 2020 05:43 )             34.4     01-16    141  |  94<L>  |  14  ----------------------------<  153<H>  3.2<L>   |  41<H>  |  0.37<L>    Ca    9.0      16 Jan 2020 05:43  Phos  2.8     01-16  Mg     2.1     01-16    TPro  6.0  /  Alb  2.4<L>  /  TBili  0.2  /  DBili  x   /  AST  19  /  ALT  27  /  AlkPhos  80  01-16      MEDICATIONS  (STANDING):  dexMEDEtomidine Infusion 0.3 MICROgram(s)/kG/Hr (4.08 mL/Hr) IV Continuous <Continuous>  dextrose 5%. 1000 milliLiter(s) (50 mL/Hr) IV Continuous <Continuous>  dextrose 50% Injectable 12.5 Gram(s) IV Push once  dextrose 50% Injectable 25 Gram(s) IV Push once  dextrose 50% Injectable 25 Gram(s) IV Push once  enoxaparin Injectable 40 milliGRAM(s) SubCutaneous daily  FIRST- Mouthwash  BLM 5 milliLiter(s) Swish and Spit four times a day  hydrocortisone 2.5% Rectal Cream 1 Application(s) Rectal two times a day  hydrocortisone hemorrhoidal Suppository 1 Suppository(s) Rectal two times a day  influenza   Vaccine 0.5 milliLiter(s) IntraMuscular once  insulin lispro (HumaLOG) corrective regimen sliding scale   SubCutaneous three times a day before meals  insulin lispro (HumaLOG) corrective regimen sliding scale   SubCutaneous at bedtime  lactobacillus acidophilus 1 Tablet(s) Oral three times a day with meals  lidocaine 2% Gel 1 Application(s) Topical three times a day  mesalamine Suppository 1000 milliGRAM(s) Rectal at bedtime  methylPREDNISolone sodium succinate Injectable 60 milliGRAM(s) IV Push every 12 hours  nystatin    Suspension [id number] Unit(s) Swish and Swallow four times a day  OXcarbazepine 300 milliGRAM(s) Oral two times a day  pantoprazole  Injectable 40 milliGRAM(s) IV Push daily  potassium phosphate / sodium phosphate powder 1 Packet(s) Oral three times a day  propranolol 20 milliGRAM(s) Oral three times a day  sucralfate 1 Gram(s) Oral two times a day  tiotropium 18 MICROgram(s) Capsule 1 Capsule(s) Inhalation daily  witch hazel Pads 1 Application(s) Topical three times a day  zinc oxide 20% Ointment 1 Application(s) Topical two times a day    MEDICATIONS  (PRN):  acetaminophen   Tablet .. 650 milliGRAM(s) Oral every 6 hours PRN Temp greater or equal to 38C (100.4F), Mild Pain (1 - 3)  ALBUTerol    0.083% 2.5 milliGRAM(s) Nebulizer every 6 hours PRN Shortness of Breath and/or Wheezing  dextrose 40% Gel 15 Gram(s) Oral once PRN Blood Glucose LESS THAN 70 milliGRAM(s)/deciliter  glucagon  Injectable 1 milliGRAM(s) IntraMuscular once PRN Glucose LESS THAN 70 milligrams/deciliter  LORazepam     Tablet 2 milliGRAM(s) Oral every 6 hours PRN Anxiety  LORazepam   Injectable 1 milliGRAM(s) IV Push every 6 hours PRN Anxiety  morphine  - Injectable 1 milliGRAM(s) IV Push every 4 hours PRN Severe Pain (7 - 10)  ondansetron    Tablet 4 milliGRAM(s) Oral every 12 hours PRN Nausea  simethicone 80 milliGRAM(s) Chew two times a day PRN Gas      Physical exam  Gen  awake and alert   Neuro  no pain   Card  sinus tach   Pulm  clear breath sounds bilaterally   Abd   soft nd/nt   Ext warm and well perfused         I&O's Summary    15 Mauricio 2020 07:01  -  16 Jan 2020 07:00  --------------------------------------------------------  IN: 1533 mL / OUT: 4410 mL / NET: -2877 mL    16 Jan 2020 07:01  -  16 Jan 2020 15:57  --------------------------------------------------------  IN: 56 mL / OUT: 120 mL / NET: -64 mL        Assessment  59y Female  w/ PAST MEDICAL & SURGICAL HISTORY:  Interstitial lung disease: on home o2 prn  NHL (non-Hodgkin's lymphoma): Agem 45 sp chemo/rt/stem cell  Transient cerebral ischemia, unspecified type  Mitral prolapse  History of tonsillectomy  History of appendectomy  admitted with complaints of Patient is a 59y old  Female who presents with a chief complaint of pneumothorax, colitis, UTI (16 Jan 2020 12:53)  .  On (Date), patient underwent Chest tube insertion  . Postoperative course/issues:    PLAN    Patient's chest tube was removed at bedside without incident.   It was partially out already.   I am uncertain whether this tube was in the abdominal cavity or the pleural cavity/     ICU to call thoracic surgery if there are any issues with lungs/abdomen

## 2020-01-16 NOTE — PROGRESS NOTE ADULT - SUBJECTIVE AND OBJECTIVE BOX
Date/Time Patient Seen:  		  Referring MD:   Data Reviewed	       Patient is a 59y old  Female who presents with a chief complaint of pneumothorax, colitis, UTI (15 Mauricio 2020 15:46)      Subjective/HPI     PAST MEDICAL & SURGICAL HISTORY:  Interstitial lung disease: on home o2 prn  NHL (non-Hodgkin's lymphoma): Agem 45 sp chemo/rt/stem cell  Transient cerebral ischemia, unspecified type  Mitral prolapse  Pulmonary disease  History of tonsillectomy  History of appendectomy        Medication list         MEDICATIONS  (STANDING):  dexMEDEtomidine Infusion 0.3 MICROgram(s)/kG/Hr (4.08 mL/Hr) IV Continuous <Continuous>  dextrose 5%. 1000 milliLiter(s) (50 mL/Hr) IV Continuous <Continuous>  dextrose 50% Injectable 12.5 Gram(s) IV Push once  dextrose 50% Injectable 25 Gram(s) IV Push once  dextrose 50% Injectable 25 Gram(s) IV Push once  enoxaparin Injectable 40 milliGRAM(s) SubCutaneous daily  fluconAZOLE   Tablet 100 milliGRAM(s) Oral daily  hydrocortisone 2.5% Rectal Cream 1 Application(s) Rectal two times a day  hydrocortisone hemorrhoidal Suppository 1 Suppository(s) Rectal two times a day  influenza   Vaccine 0.5 milliLiter(s) IntraMuscular once  insulin lispro (HumaLOG) corrective regimen sliding scale   SubCutaneous three times a day before meals  insulin lispro (HumaLOG) corrective regimen sliding scale   SubCutaneous at bedtime  lactobacillus acidophilus 1 Tablet(s) Oral three times a day with meals  lidocaine 2% Gel 1 Application(s) Topical three times a day  mesalamine Suppository 1000 milliGRAM(s) Rectal at bedtime  morphine  - Injectable 1 milliGRAM(s) IV Push once  OXcarbazepine 300 milliGRAM(s) Oral two times a day  pantoprazole  Injectable 40 milliGRAM(s) IV Push daily  potassium chloride    Tablet ER 40 milliEquivalent(s) Oral every 4 hours  potassium phosphate / sodium phosphate powder 1 Packet(s) Oral three times a day  propranolol 20 milliGRAM(s) Oral three times a day  sucralfate 1 Gram(s) Oral two times a day  tiotropium 18 MICROgram(s) Capsule 1 Capsule(s) Inhalation daily  witch hazel Pads 1 Application(s) Topical three times a day  zinc oxide 20% Ointment 1 Application(s) Topical two times a day    MEDICATIONS  (PRN):  acetaminophen   Tablet .. 650 milliGRAM(s) Oral every 6 hours PRN Temp greater or equal to 38C (100.4F), Mild Pain (1 - 3)  ALBUTerol    0.083% 2.5 milliGRAM(s) Nebulizer every 6 hours PRN Shortness of Breath and/or Wheezing  dextrose 40% Gel 15 Gram(s) Oral once PRN Blood Glucose LESS THAN 70 milliGRAM(s)/deciliter  diazepam  Injectable 2 milliGRAM(s) IV Push every 6 hours PRN anxiety  glucagon  Injectable 1 milliGRAM(s) IntraMuscular once PRN Glucose LESS THAN 70 milligrams/deciliter  morphine  - Injectable 1 milliGRAM(s) IV Push every 4 hours PRN Severe Pain (7 - 10)  ondansetron    Tablet 4 milliGRAM(s) Oral every 12 hours PRN Nausea  simethicone 80 milliGRAM(s) Chew two times a day PRN Gas         Vitals log        ICU Vital Signs Last 24 Hrs  T(C): 36.4 (16 Jan 2020 07:34), Max: 36.6 (15 Mauricio 2020 11:45)  T(F): 97.6 (16 Jan 2020 07:34), Max: 97.8 (15 Mauricio 2020 11:45)  HR: 66 (16 Jan 2020 08:00) (63 - 98)  BP: 138/80 (16 Jan 2020 08:00) (115/68 - 165/100)  BP(mean): 105 (16 Jan 2020 08:00) (86 - 127)  ABP: --  ABP(mean): --  RR: 22 (16 Jan 2020 08:00) (21 - 41)  SpO2: 98% (16 Jan 2020 08:00) (90% - 99%)           Input and Output:  I&O's Detail    15 Mauricio 2020 07:01  -  16 Jan 2020 07:00  --------------------------------------------------------  IN:    dexmedetomidine Infusion: 243 mL    Oral Fluid: 1140 mL    Solution: 150 mL  Total IN: 1533 mL    OUT:    Chest Tube: 10 mL    Voided: 4400 mL  Total OUT: 4410 mL    Total NET: -2877 mL          Lab Data                        10.9   8.36  )-----------( 650      ( 16 Jan 2020 05:43 )             34.4     01-16    141  |  94<L>  |  14  ----------------------------<  153<H>  3.2<L>   |  41<H>  |  0.37<L>    Ca    9.0      16 Jan 2020 05:43  Phos  2.8     01-16  Mg     2.1     01-16    TPro  6.0  /  Alb  2.4<L>  /  TBili  0.2  /  DBili  x   /  AST  19  /  ALT  27  /  AlkPhos  80  01-16            Review of Systems	      Objective     Physical Examination    heart s1s2  lung dec BS  abd soft      Pertinent Lab findings & Imaging      Shauna:  NO   Adequate UO     I&O's Detail    15 Mauricio 2020 07:01  -  16 Jan 2020 07:00  --------------------------------------------------------  IN:    dexmedetomidine Infusion: 243 mL    Oral Fluid: 1140 mL    Solution: 150 mL  Total IN: 1533 mL    OUT:    Chest Tube: 10 mL    Voided: 4400 mL  Total OUT: 4410 mL    Total NET: -2877 mL               Discussed with:     Cultures:	        Radiology

## 2020-01-16 NOTE — SWALLOW BEDSIDE ASSESSMENT ADULT - PHARYNGEAL PHASE
Decreased laryngeal elevation/Delayed pharyngeal swallow Delayed pharyngeal swallow/Decreased laryngeal elevation Decreased laryngeal elevation/Throat clear post oral intake/Multiple swallows/Delayed pharyngeal swallow

## 2020-01-16 NOTE — PROGRESS NOTE ADULT - ASSESSMENT
59F with PMH of interstitial lung disease hx of pneumothorax (on 2L NC at home), hx pulmonary nodules, Meniere's disease, Non-Hodgkin's lymphoma (SCD/XRT/chemo at [facility] 2003), hx of CVA (3/2019 - residual L sided weakness, unable to use her L arm), not on ASA or Plavix due to GIB, Seizure disorder, tachycardia, MVP, hx of chronic SDH admitted with  severe sepsis 2/2 superimposed PNA resolving, Acute on chronic hypoxemic respiratory failure requiring high flow NC, PTX s/p chest tube    Neuro: Anxiety - will start ativan PO 2mg prn, if unable to tolerate PO may give IV. Steroid induced delirium should improve now that we will be tapering. Aim to titrate off Precedex. Seizure Disorder - continue trileptal, f/u level pending from blood draw 1/10. DC morphine.  Cardio: Off Levo, continue to monitor BP. Continue propanolol.  Pulm: Acute on chronic hypoxemic respiratory failure 2/2 ILD and HCAP -resolving with some component of PTX. Abx completed. Will taper steroids today On High flow, FiO2 titrated down to 55% FiO2, pt maintaining O2 sat of 94%. Will continue to titrate down as tolerated. Continue spiriva. Maintain chest tube to suction. Repeat CXR.   ID: Zosyn completed for PNA, fluconazole continue for oral thrush  GI: Albumin started due to low protein state, continue encouraging PO intake, poor oral intake, continue mesalamine and witch hazel for colitis.  Endo: routine Accu-Cheks, low dose sliding scale insulin coverage  Renal/lytes: normal renal function, Low K repleted, will f/u lytes in am.  Heme/Onc: DVT ppx with Lovenox  Dispo: Full code 59F with PMH of interstitial lung disease hx of pneumothorax (on 2L NC at home), hx pulmonary nodules, Meniere's disease, Non-Hodgkin's lymphoma (SCD/XRT/chemo at [facility] 2003), hx of CVA (3/2019 - residual L sided weakness, unable to use her L arm), not on ASA or Plavix due to GIB, Seizure disorder, tachycardia, MVP, hx of chronic SDH admitted with  severe sepsis 2/2 superimposed PNA resolving, Acute on chronic hypoxemic respiratory failure requiring high flow NC, PTX s/p chest tube    Neuro: Anxiety - will start ativan PO 2mg prn, if unable to tolerate PO may give IV. Steroid induced delirium should improve now that we will be tapering. Aim to titrate off Precedex. Seizure Disorder - continue trileptal, f/u level pending from blood draw 1/10. DC morphine.  Cardio: Off Levo, continue to monitor BP. Continue propanolol.  Pulm: Acute on chronic hypoxemic respiratory failure 2/2 ILD and HCAP -resolving with some component of PTX. Abx completed. Will taper steroids today to 60mg bid to goal of 40qd. On High flow, FiO2 titrated down to 40% FiO2, pt maintaining O2 sat of 94%. Will continue to titrate down as tolerated. Continue spiriva. Chest tube removed by Dr [facility], repeat CXR.   ID: Zosyn completed for PNA, dc oral fluconazole, start nystatin swish and swallow for oral thrush  GI: continue encouraging PO intake, continue mesalamine and witch hazel for colitis.  Endo: routine Accu-Cheks, low dose sliding scale insulin coverage  Renal/lytes: normal renal function, Low K, repleted, f/u bmp in am  Heme/Onc: DVT ppx with Lovenox  Dispo: Full code

## 2020-01-16 NOTE — SWALLOW BEDSIDE ASSESSMENT ADULT - SWALLOW EVAL: DIAGNOSIS
1. The patient demonstrated a mild oral dysphagia for puree, nectar thick, and thin liquid textures marked by delayed bolus collection, transfer, and posterior transport. 2. The patient demonstrated a mild pharyngeal dysphagia for puree and nectar thick liquids marked by a delayed pharyngeal swallow trigger with reduced hyolaryngeal elevation upon digital palpation w/o evidence of airway penetration. 3. The patient demonstrated a moderate-severe pharyngeal dysphagia for thin liquids marked by a delayed pharyngeal swallow trigger with reduced hyolaryngeal elevation upon digital palpation resulting in multiple swallows suggestive of pharyngeal stasis and/or airway penetration and throat clearing suggestive of airway penetration. 1. The patient demonstrated a mild oral dysphagia for puree, nectar thick, and thin liquid textures marked by delayed bolus collection, transfer, and posterior transport. 2. The patient demonstrated a mild pharyngeal dysphagia for puree and nectar thick liquids marked by a delayed pharyngeal swallow trigger with reduced hyolaryngeal elevation upon digital palpation w/o evidence of airway penetration. 3. The patient demonstrated a moderate-severe pharyngeal dysphagia for thin liquids marked by a delayed pharyngeal swallow trigger with reduced hyolaryngeal elevation upon digital palpation resulting in multiple swallows suggestive of pharyngeal stasis and/or airway penetration and throat clearing suggestive of airway penetration. *It should be noted that the patient refused chewable solid trials during today's evaluation.

## 2020-01-16 NOTE — PROGRESS NOTE ADULT - SUBJECTIVE AND OBJECTIVE BOX
infectious diseases progress note:    ROMIE VIRAMONTES is a 59y y. o. Female patient    Patient with no concerning overnight events    Allergies    IV Contrast (Anaphylaxis)  shellfish. (Anaphylaxis)    Intolerances        ANTIBIOTICS/RELEVANT:  antimicrobials  fluconAZOLE   Tablet 100 milliGRAM(s) Oral daily    immunologic:  influenza   Vaccine 0.5 milliLiter(s) IntraMuscular once    OTHER:  acetaminophen   Tablet .. 650 milliGRAM(s) Oral every 6 hours PRN  ALBUTerol    0.083% 2.5 milliGRAM(s) Nebulizer every 6 hours PRN  dexMEDEtomidine Infusion 0.3 MICROgram(s)/kG/Hr IV Continuous <Continuous>  dextrose 40% Gel 15 Gram(s) Oral once PRN  dextrose 5%. 1000 milliLiter(s) IV Continuous <Continuous>  dextrose 50% Injectable 12.5 Gram(s) IV Push once  dextrose 50% Injectable 25 Gram(s) IV Push once  dextrose 50% Injectable 25 Gram(s) IV Push once  diazepam  Injectable 2 milliGRAM(s) IV Push every 6 hours PRN  enoxaparin Injectable 40 milliGRAM(s) SubCutaneous daily  glucagon  Injectable 1 milliGRAM(s) IntraMuscular once PRN  hydrocortisone 2.5% Rectal Cream 1 Application(s) Rectal two times a day  hydrocortisone hemorrhoidal Suppository 1 Suppository(s) Rectal two times a day  insulin lispro (HumaLOG) corrective regimen sliding scale   SubCutaneous three times a day before meals  insulin lispro (HumaLOG) corrective regimen sliding scale   SubCutaneous at bedtime  lactobacillus acidophilus 1 Tablet(s) Oral three times a day with meals  lidocaine 2% Gel 1 Application(s) Topical three times a day  mesalamine Suppository 1000 milliGRAM(s) Rectal at bedtime  morphine  - Injectable 1 milliGRAM(s) IV Push every 4 hours PRN  morphine  - Injectable 1 milliGRAM(s) IV Push once  ondansetron    Tablet 4 milliGRAM(s) Oral every 12 hours PRN  OXcarbazepine 300 milliGRAM(s) Oral two times a day  pantoprazole  Injectable 40 milliGRAM(s) IV Push daily  potassium chloride    Tablet ER 40 milliEquivalent(s) Oral every 4 hours  potassium phosphate / sodium phosphate powder 1 Packet(s) Oral three times a day  propranolol 20 milliGRAM(s) Oral three times a day  simethicone 80 milliGRAM(s) Chew two times a day PRN  sucralfate 1 Gram(s) Oral two times a day  tiotropium 18 MICROgram(s) Capsule 1 Capsule(s) Inhalation daily  witch hazel Pads 1 Application(s) Topical three times a day  zinc oxide 20% Ointment 1 Application(s) Topical two times a day      Objective:  Vital Signs Last 24 Hrs  T(C): 36.4 (16 Jan 2020 07:34), Max: 36.6 (15 Mauricio 2020 11:45)  T(F): 97.6 (16 Jan 2020 07:34), Max: 97.8 (15 Mauricio 2020 11:45)  HR: 76 (16 Jan 2020 09:00) (63 - 98)  BP: 148/86 (16 Jan 2020 09:00) (115/68 - 165/100)  BP(mean): 109 (16 Jan 2020 09:00) (86 - 127)  RR: 20 (16 Jan 2020 09:00) (20 - 41)  SpO2: 96% (16 Jan 2020 09:00) (90% - 99%)    T(C): 36.4 (01-16-20 @ 07:34), Max: 36.7 (01-15-20 @ 03:26)  T(C): 36.4 (01-16-20 @ 07:34), Max: 37.1 (01-13-20 @ 23:40)  T(C): 36.4 (01-16-20 @ 07:34), Max: 37.1 (01-13-20 @ 23:40)    PHYSICAL EXAM:  Constitutional: Well-developed, well nourished  Eyes: PERRLA, EOMI  Ear/Nose/Throat: oropharynx normal	  Neck: no JVD, no lymphadenopathy, supple  Respiratory: no accessory muscle use  Cardiovascular: RRR,   Gastrointestinal: soft, NT  Extremities: no clubbing, no cyanosis, edema absent      LABS:                        10.9   8.36  )-----------( 650      ( 16 Jan 2020 05:43 )             34.4       8.36 01-16 @ 05:43  8.54 01-15 @ 05:19  9.59 01-14 @ 05:41  12.57 01-13 @ 05:28  17.94 01-12 @ 05:40  8.62 01-11 @ 05:54  9.27 01-10 @ 05:36      01-16    141  |  94<L>  |  14  ----------------------------<  153<H>  3.2<L>   |  41<H>  |  0.37<L>    Ca    9.0      16 Jan 2020 05:43  Phos  2.8     01-16  Mg     2.1     01-16    TPro  6.0  /  Alb  2.4<L>  /  TBili  0.2  /  DBili  x   /  AST  19  /  ALT  27  /  AlkPhos  80  01-16      Creatinine, Serum: 0.37 mg/dL (01-16-20 @ 05:43)  Creatinine, Serum: 0.34 mg/dL (01-15-20 @ 05:19)  Creatinine, Serum: 0.38 mg/dL (01-14-20 @ 05:41)  Creatinine, Serum: 0.41 mg/dL (01-13-20 @ 05:28)  Creatinine, Serum: 0.40 mg/dL (01-12-20 @ 05:40)  Creatinine, Serum: 0.54 mg/dL (01-11-20 @ 15:10)  Creatinine, Serum: 0.35 mg/dL (01-11-20 @ 05:54)  Creatinine, Serum: 0.47 mg/dL (01-10-20 @ 05:36)                MICROBIOLOGY:              RADIOLOGY & ADDITIONAL STUDIES:

## 2020-01-16 NOTE — SWALLOW BEDSIDE ASSESSMENT ADULT - ASR SWALLOW ASPIRATION MONITOR
change of breathing pattern/cough/oral hygiene/position upright (90Y)/throat clearing/gurgly voice/fever/pneumonia/upper respiratory infection

## 2020-01-16 NOTE — PROGRESS NOTE ADULT - SUBJECTIVE AND OBJECTIVE BOX
INTERVAL HPI/OVERNIGHT EVENTS:  pt seen and examined, family present  remains on hi flow  denies n/v/d/abd pain  c/o dry mouth  not eating much  per rn, did okay w po meds overnight  slp eval pending    MEDICATIONS  (STANDING):  dexMEDEtomidine Infusion 0.3 MICROgram(s)/kG/Hr (4.08 mL/Hr) IV Continuous <Continuous>  dextrose 5%. 1000 milliLiter(s) (50 mL/Hr) IV Continuous <Continuous>  dextrose 50% Injectable 12.5 Gram(s) IV Push once  dextrose 50% Injectable 25 Gram(s) IV Push once  dextrose 50% Injectable 25 Gram(s) IV Push once  enoxaparin Injectable 40 milliGRAM(s) SubCutaneous daily  fluconAZOLE   Tablet 100 milliGRAM(s) Oral daily  hydrocortisone 2.5% Rectal Cream 1 Application(s) Rectal two times a day  hydrocortisone hemorrhoidal Suppository 1 Suppository(s) Rectal two times a day  influenza   Vaccine 0.5 milliLiter(s) IntraMuscular once  insulin lispro (HumaLOG) corrective regimen sliding scale   SubCutaneous three times a day before meals  insulin lispro (HumaLOG) corrective regimen sliding scale   SubCutaneous at bedtime  lactobacillus acidophilus 1 Tablet(s) Oral three times a day with meals  lidocaine 2% Gel 1 Application(s) Topical three times a day  mesalamine Suppository 1000 milliGRAM(s) Rectal at bedtime  morphine  - Injectable 1 milliGRAM(s) IV Push once  OXcarbazepine 300 milliGRAM(s) Oral two times a day  pantoprazole  Injectable 40 milliGRAM(s) IV Push daily  potassium chloride    Tablet ER 40 milliEquivalent(s) Oral every 4 hours  potassium phosphate / sodium phosphate powder 1 Packet(s) Oral three times a day  propranolol 20 milliGRAM(s) Oral three times a day  sucralfate 1 Gram(s) Oral two times a day  tiotropium 18 MICROgram(s) Capsule 1 Capsule(s) Inhalation daily  witch hazel Pads 1 Application(s) Topical three times a day  zinc oxide 20% Ointment 1 Application(s) Topical two times a day    MEDICATIONS  (PRN):  acetaminophen   Tablet .. 650 milliGRAM(s) Oral every 6 hours PRN Temp greater or equal to 38C (100.4F), Mild Pain (1 - 3)  ALBUTerol    0.083% 2.5 milliGRAM(s) Nebulizer every 6 hours PRN Shortness of Breath and/or Wheezing  dextrose 40% Gel 15 Gram(s) Oral once PRN Blood Glucose LESS THAN 70 milliGRAM(s)/deciliter  diazepam  Injectable 2 milliGRAM(s) IV Push every 6 hours PRN anxiety  glucagon  Injectable 1 milliGRAM(s) IntraMuscular once PRN Glucose LESS THAN 70 milligrams/deciliter  morphine  - Injectable 1 milliGRAM(s) IV Push every 4 hours PRN Severe Pain (7 - 10)  ondansetron    Tablet 4 milliGRAM(s) Oral every 12 hours PRN Nausea  simethicone 80 milliGRAM(s) Chew two times a day PRN Gas      Allergies    IV Contrast (Anaphylaxis)  shellfish. (Anaphylaxis)    Intolerances        Review of Systems:    General:  No wt loss, fevers, chills, night sweats, fatigue   Eyes:  Good vision, no reported pain  ENT:  dry mouth  CV:  No pain, palpitations, hypo/hypertension  Resp:  No dyspnea, cough, tachypnea, wheezing  GI:  No pain, No nausea, No vomiting, No diarrhea, No constipation, No weight loss, No fever, No pruritis, No rectal bleeding, No melena, ?dysphagia  :  No pain, bleeding, incontinence, nocturia  Muscle:  No pain, weakness  Neuro:  No weakness, tingling, memory problems  Psych:  No fatigue, insomnia, mood problems, depression  Endocrine:  No polyuria, polydypsia, cold/heat intolerance  Heme:  No petechiae, ecchymosis, easy bruisability  Skin:  No rash, tattoos, scars, edema      Vital Signs Last 24 Hrs  T(C): 36.4 (16 Jan 2020 07:34), Max: 36.6 (15 Mauricio 2020 11:45)  T(F): 97.6 (16 Jan 2020 07:34), Max: 97.8 (15 Mauricio 2020 11:45)  HR: 66 (16 Jan 2020 08:00) (63 - 98)  BP: 138/80 (16 Jan 2020 08:00) (115/68 - 165/100)  BP(mean): 105 (16 Jan 2020 08:00) (86 - 127)  RR: 22 (16 Jan 2020 08:00) (21 - 41)  SpO2: 98% (16 Jan 2020 08:00) (90% - 99%)    PHYSICAL EXAM:  Constitutional: lying in bed on hi flow  HEENT: ncat  Neck: No LAD  Gastrointestinal: soft nt nd  Extremities: No peripheral edema  Neurological: awake alert responds appropriately    LABS:                        10.9   8.36  )-----------( 650      ( 16 Jan 2020 05:43 )             34.4     01-16    141  |  94<L>  |  14  ----------------------------<  153<H>  3.2<L>   |  41<H>  |  0.37<L>    Ca    9.0      16 Jan 2020 05:43  Phos  2.8     01-16  Mg     2.1     01-16    TPro  6.0  /  Alb  2.4<L>  /  TBili  0.2  /  DBili  x   /  AST  19  /  ALT  27  /  AlkPhos  80  01-16          RADIOLOGY & ADDITIONAL TESTS:

## 2020-01-16 NOTE — PROGRESS NOTE ADULT - SUBJECTIVE AND OBJECTIVE BOX
Neurology follow up note    ROMIE [id number]      Interval History:    Patient feels ok no new complaints seen with daughter breathing stable     MEDICATIONS    acetaminophen   Tablet .. 650 milliGRAM(s) Oral every 6 hours PRN  ALBUTerol    0.083% 2.5 milliGRAM(s) Nebulizer every 6 hours PRN  dexMEDEtomidine Infusion 0.3 MICROgram(s)/kG/Hr IV Continuous <Continuous>  dextrose 40% Gel 15 Gram(s) Oral once PRN  dextrose 5%. 1000 milliLiter(s) IV Continuous <Continuous>  dextrose 50% Injectable 12.5 Gram(s) IV Push once  dextrose 50% Injectable 25 Gram(s) IV Push once  dextrose 50% Injectable 25 Gram(s) IV Push once  diazepam  Injectable 2 milliGRAM(s) IV Push every 6 hours PRN  enoxaparin Injectable 40 milliGRAM(s) SubCutaneous daily  fluconAZOLE   Tablet 100 milliGRAM(s) Oral daily  glucagon  Injectable 1 milliGRAM(s) IntraMuscular once PRN  hydrocortisone 2.5% Rectal Cream 1 Application(s) Rectal two times a day  hydrocortisone hemorrhoidal Suppository 1 Suppository(s) Rectal two times a day  influenza   Vaccine 0.5 milliLiter(s) IntraMuscular once  insulin lispro (HumaLOG) corrective regimen sliding scale   SubCutaneous three times a day before meals  insulin lispro (HumaLOG) corrective regimen sliding scale   SubCutaneous at bedtime  lactobacillus acidophilus 1 Tablet(s) Oral three times a day with meals  lidocaine 2% Gel 1 Application(s) Topical three times a day  mesalamine Suppository 1000 milliGRAM(s) Rectal at bedtime  morphine  - Injectable 1 milliGRAM(s) IV Push every 4 hours PRN  morphine  - Injectable 1 milliGRAM(s) IV Push once  ondansetron    Tablet 4 milliGRAM(s) Oral every 12 hours PRN  OXcarbazepine 300 milliGRAM(s) Oral two times a day  pantoprazole  Injectable 40 milliGRAM(s) IV Push daily  potassium chloride    Tablet ER 40 milliEquivalent(s) Oral every 4 hours  potassium phosphate / sodium phosphate powder 1 Packet(s) Oral three times a day  propranolol 20 milliGRAM(s) Oral three times a day  simethicone 80 milliGRAM(s) Chew two times a day PRN  sucralfate 1 Gram(s) Oral two times a day  tiotropium 18 MICROgram(s) Capsule 1 Capsule(s) Inhalation daily  witch hazel Pads 1 Application(s) Topical three times a day  zinc oxide 20% Ointment 1 Application(s) Topical two times a day      Allergies    IV Contrast (Anaphylaxis)  shellfish. (Anaphylaxis)    Intolerances            Vital Signs Last 24 Hrs  T(C): 36.4 (16 Jan 2020 07:34), Max: 36.6 (15 Mauricio 2020 11:45)  T(F): 97.6 (16 Jan 2020 07:34), Max: 97.8 (15 Mauricio 2020 11:45)  HR: 76 (16 Jan 2020 09:00) (63 - 98)  BP: 148/86 (16 Jan 2020 09:00) (115/68 - 165/100)  BP(mean): 109 (16 Jan 2020 09:00) (86 - 127)  RR: 20 (16 Jan 2020 09:00) (20 - 41)  SpO2: 96% (16 Jan 2020 09:00) (90% - 99%)    REVIEW OF SYSTEMS:  Constitutional:  The patient denies fever, chills, or night sweats.  Head:  Occ headaches.  Eyes:  No double vision or blurry vision.  Ears:  No ringing in the ears.  Neck:  No neck pain.  Respiratory:  Occasional cough with shortness of breath.  Cardiovascular:  no chest pain.  Abdomen:  occ nausea,  no vomiting, or abdominal pain.  Extremities/Neurological:  No numbness or tingling.  Musculoskeletal:  Occasional joint pain.    PHYSICAL EXAMINATION:   HEENT:  Head:  Normocephalic, atraumatic.  Eyes:  No scleral icterus.  Ears:  Hearing bilaterally appeared to be intact.  NECK:  Supple.  RESPIRATORY:  Decreased breath sounds bilaterally, but most prominent on the right.  CARDIOVASCULAR:  S1 and S2 heard.  ABDOMEN:  Soft and nontender.  Extremities:  No clubbing or cyanosis were noted.      NEUROLOGIC:  The patient is awake and alert.    Extraocular movements were intact.  Pupils were equal, round, and reactive bilaterally 3 mm to 2 mm.  Speech was fluent.  Smile was symmetric.  Motor:  Right upper was 5/5, left upper  arm brace was 3/5 decrease rom hand and finger in flexed position , right lower was 4/5, left lower was 3+/5.  As per my conversation with the spouse, after her apparent event back in March, questionable seizure versus questionable stroke.  She was left with left-sided weakness.  Sensory:  Bilateral upper and lower appeared intact to light touch.              LABS:  CBC Full  -  ( 16 Jan 2020 05:43 )  WBC Count : 8.36 K/uL  RBC Count : 3.86 M/uL  Hemoglobin : 10.9 g/dL  Hematocrit : 34.4 %  Platelet Count - Automated : 650 K/uL  Mean Cell Volume : 89.1 fl  Mean Cell Hemoglobin : 28.2 pg  Mean Cell Hemoglobin Concentration : 31.7 gm/dL  Auto Neutrophil # : 7.46 K/uL  Auto Lymphocyte # : 0.48 K/uL  Auto Monocyte # : 0.26 K/uL  Auto Eosinophil # : 0.00 K/uL  Auto Basophil # : 0.01 K/uL  Auto Neutrophil % : 89.3 %  Auto Lymphocyte % : 5.7 %  Auto Monocyte % : 3.1 %  Auto Eosinophil % : 0.0 %  Auto Basophil % : 0.1 %      01-16    141  |  94<L>  |  14  ----------------------------<  153<H>  3.2<L>   |  41<H>  |  0.37<L>    Ca    9.0      16 Jan 2020 05:43  Phos  2.8     01-16  Mg     2.1     01-16    TPro  6.0  /  Alb  2.4<L>  /  TBili  0.2  /  DBili  x   /  AST  19  /  ALT  27  /  AlkPhos  80  01-16    Hemoglobin A1C:     LIVER FUNCTIONS - ( 16 Jan 2020 05:43 )  Alb: 2.4 g/dL / Pro: 6.0 g/dL / ALK PHOS: 80 U/L / ALT: 27 U/L / AST: 19 U/L / GGT: x           Vitamin B12         RADIOLOGY    ANALYSIS AND PLAN:  This is a 59-year-old with a history of possibly epilepsy verse TIAs, history of chronic subdural hydromas and change in mental status.    1.	For episode of change in mental status, as per my conversation with the spouse, these appear to occur primarily at the same time at night for the last three to four weeks.  The patient has been on Trileptal for about eight to nine months.  Suspect less likely this is Trileptal, Questionable, the patient could have any type of sleep-related disorder causing these events to occur at night.  I spoke with the spouse, the patient should undergo sleep studies.  2.	For history of chronic subdural hematoma, these appeared to have resolved from previous MRI.  3.	For history of possible underlying epilepsy, for now, I will continue the patient on her Trileptal..  4.	Monitor CO2 levels as needed and respiratory status   5.	spoke outside neurologist Dr. Wallace in past agrees to continue trileptal for now  His telephone number is [phone].  6.	Spouse's name is Marialuisa, his telephone number is [phone] spoke to him at bedside 1/13/2020  7.	steroids as needed   8.	H/O R pigtail placed by thoracic surgery   9.	no new events   10.	seen with daughter today 1/16/2020  11.	Greater than 16 minutes of time was spent with the patient, plan of care, reviewing data, speaking to the family and multidisciplinary healthcare team

## 2020-01-16 NOTE — PROGRESS NOTE ADULT - PROBLEM SELECTOR PLAN 1
interstitial lung disease hx of pneumothorax (on 2L NC at home), hx pulmonary nodules, Meniere's disease, Non-Hodgkin's lymphoma (SCD/XRT/chemo at [facility] 2003), hx of CVA (3/2019 - residual L sided weakness, unable to use her L arm), not on ASA or Plavix due to GIB, Seizure disorder, tachycardia, MVP, hx of chronic SDH admitted with  severe sepsis 2/2 superimposed PNA resolving, Acute on chronic hypoxemic respiratory failure requiring high flow NC, PTX s/p chest tube  remains on high flow  completed ABX  ct in place - ? resolution of likely a chr PTX -   prognosis poor  GOC discussion ongoing  supportive measures   s/p Steroids - Systemic  minimal improvement if any -

## 2020-01-16 NOTE — PROGRESS NOTE ADULT - ATTENDING COMMENTS
59F h/o ILD (unknown etiology, no Bx), chronic hypoxemic respiratory failure, known chronic R PTX which has been conservatively managed, remote NHL (s/p XRT, chemo and SCT), previous CVA with R sided weakness, seizure do on trileptal, chronic SDH v hygromas admitted 12/26 with colitis/proctitis and treated with CTX and flagyl.  Hospital course complicated by development of HCAP, acute on chronic hypoxemic respiratory failure requiring high flow NC.    Neuro: delirium likely steroid induced, will start PO ativan in hopes to wean from precedex; continue trileptal for sz disorder  CV: no acute issues, continue home propanolol   Pulm: R sided pneumo remains unchanged, chest tube low lying, presumably posterior chest - will d/w CTSx regarding removal today, unlikely to benefit from replacement higher in hemithorax; hypoxemic respiratory failure, multifactorial from HCAP, ILD; to complete 3 days of  pulse dose steroids today; continue duonebs, mucomyst, hypertonic for secretions clearance; encourage incentive jesus; wean FiO2 as tolerated, goal O2 sat > 88-90   GI: tolerating PO diet, appetite improving; continue mesalamine and witch hazel for colitits  Renal: no acute issues  ID: zosyn course completed 1/14; fluconazole changed to FRST mouthwash for oral thrush with painful swallowing  Dispo: to remain in ICU today given O2 requirements; PT eval when FiO2 requirements improved

## 2020-01-16 NOTE — PROGRESS NOTE ADULT - PROBLEM SELECTOR PLAN 1
ct showing stercoral colitis c/b rectal pain in setting of hemorrhoids  overall improved  monitoring off bowel regimen   cont lido and hydrocortisone cream   cont anusol supp bid, witch hazel pads prn  cont canasa suppository  up to date w colonoscopy, 4/2019 showed only non bleeding internal hemorrhoids; given persistence of symptoms, offered repeat c-scope but pt deferred

## 2020-01-16 NOTE — PROGRESS NOTE ADULT - SUBJECTIVE AND OBJECTIVE BOX
Patient is a 59y old  Female who presents with a chief complaint of pneumothorax, colitis, UTI (16 Jan 2020 10:24)    24 hour events: Pts precedex increased overnight due to anxiety/agitation. Pt also reports having pain/difficulty and burning with swallowing. Pt seen this am, states her breathing has improved, alert, awake. Mentating well this am.     REVIEW OF SYSTEMS  Constitutional: No fever, chills  Neuro: No headache, continued L arm weakness from prior CVA  Resp: No cough, wheezing, improved SOB  CVS: admits to CP R sided, Denies palpitations  GI: No abdominal pain, nausea, vomiting   : No dysuria, frequency, incontinence  Skin: No itching, burning, rashes, or lesions   Psych: increased anxiety, denies hallucinations  Heme: No bleeding      T(F): 97.5 (01-16-20 @ 12:23), Max: 97.8 (01-15-20 @ 23:38)  HR: 88 (01-16-20 @ 11:00) (63 - 98)  BP: 150/85 (01-16-20 @ 11:00) (115/68 - 165/100)  RR: 22 (01-16-20 @ 11:00) (20 - 41)  SpO2: 96% (01-16-20 @ 11:00) (90% - 99%)  Wt(kg): --            I&O's Summary    01-15 @ 07:01  -  01-16 @ 07:00  --------------------------------------------------------  IN: 1533 mL / OUT: 4410 mL / NET: -2877 mL    01-16 @ 07:01  -  01-16 @ 12:53  --------------------------------------------------------  IN: 6 mL / OUT: 0 mL / NET: 6 mL      PHYSICAL EXAM  General: frail and cachectic chronically ill appearing female in NAD  CNS: awake, alert, L sided weakness residual  Resp: mild crackles R>L, decreased BS b/l lower bases, chest tube (minimal serosanguinous drainage)  CVS: regular, no murmur  Abd: soft, NT, ND  Ext: no lower ext edema, no cyanosis  Skin: warm and well perfused  Neuro: anxious, alert, oriented    MEDICATIONS  nystatin    Suspension Swish and Swallow    propranolol Oral    dextrose 40% Gel Oral PRN  dextrose 50% Injectable IV Push  dextrose 50% Injectable IV Push  dextrose 50% Injectable IV Push  glucagon  Injectable IntraMuscular PRN  insulin lispro (HumaLOG) corrective regimen sliding scale SubCutaneous  insulin lispro (HumaLOG) corrective regimen sliding scale SubCutaneous    ALBUTerol    0.083% Nebulizer PRN  tiotropium 18 MICROgram(s) Capsule Inhalation    acetaminophen   Tablet .. Oral PRN  dexMEDEtomidine Infusion IV Continuous  diazepam  Injectable IV Push PRN  morphine  - Injectable IV Push PRN  ondansetron    Tablet Oral PRN  OXcarbazepine Oral      enoxaparin Injectable SubCutaneous    mesalamine Suppository Rectal  pantoprazole  Injectable IV Push  simethicone Chew PRN  sucralfate Oral      dextrose 5%. IV Continuous  potassium chloride    Tablet ER Oral  potassium phosphate / sodium phosphate powder Oral    influenza   Vaccine IntraMuscular    FIRST- Mouthwash  BLM Swish and Spit  hydrocortisone 2.5% Rectal Cream Rectal  hydrocortisone hemorrhoidal Suppository Rectal  lidocaine 2% Gel Topical  witch hazel Pads Topical  zinc oxide 20% Ointment Topical    lactobacillus acidophilus Oral                          10.9   8.36  )-----------( 650      ( 16 Jan 2020 05:43 )             34.4       01-16    141  |  94<L>  |  14  ----------------------------<  153<H>  3.2<L>   |  41<H>  |  0.37<L>    Ca    9.0      16 Jan 2020 05:43  Phos  2.8     01-16  Mg     2.1     01-16    TPro  6.0  /  Alb  2.4<L>  /  TBili  0.2  /  DBili  x   /  AST  19  /  ALT  27  /  AlkPhos  80  01-16                  Radiology:   < from: Xray Chest 1 View- PORTABLE-Routine (01.16.20 @ 06:52) >  Partially visualized catheter projecting over the right upper quadrant abdomen. New mild right apical pneumothorax.     Patchy densities in both lungs for which a clinical correlation with pneumonia is recommended.    Mild left pleural effusion, improved.    Grossly stable left apical opacity.    Stable cardiac silhouette.    Dr. Curry is informed.      < end of copied text >      CENTRAL LINE: N  LUKE: N  A-LINE: N    GLOBAL ISSUE/BEST PRACTICE:  Analgesia: Y  Sedation:  Y  HOB elevation: yes  Stress ulcer prophylaxis: Y  VTE prophylaxis: Y  Glycemic control: Y  Nutrition: Y    CODE STATUS: FULL

## 2020-01-17 DIAGNOSIS — F43.22 ADJUSTMENT DISORDER WITH ANXIETY: ICD-10-CM

## 2020-01-17 LAB
ALBUMIN SERPL ELPH-MCNC: 2.6 G/DL — LOW (ref 3.3–5)
ALP SERPL-CCNC: 81 U/L — SIGNIFICANT CHANGE UP (ref 40–120)
ALT FLD-CCNC: 28 U/L — SIGNIFICANT CHANGE UP (ref 12–78)
ANION GAP SERPL CALC-SCNC: 3 MMOL/L — LOW (ref 5–17)
AST SERPL-CCNC: 17 U/L — SIGNIFICANT CHANGE UP (ref 15–37)
BASOPHILS # BLD AUTO: 0.02 K/UL — SIGNIFICANT CHANGE UP (ref 0–0.2)
BASOPHILS NFR BLD AUTO: 0.2 % — SIGNIFICANT CHANGE UP (ref 0–2)
BILIRUB SERPL-MCNC: 0.2 MG/DL — SIGNIFICANT CHANGE UP (ref 0.2–1.2)
BUN SERPL-MCNC: 18 MG/DL — SIGNIFICANT CHANGE UP (ref 7–23)
CALCIUM SERPL-MCNC: 9 MG/DL — SIGNIFICANT CHANGE UP (ref 8.5–10.1)
CHLORIDE SERPL-SCNC: 96 MMOL/L — SIGNIFICANT CHANGE UP (ref 96–108)
CO2 SERPL-SCNC: 41 MMOL/L — HIGH (ref 22–31)
CREAT SERPL-MCNC: 0.41 MG/DL — LOW (ref 0.5–1.3)
EOSINOPHIL # BLD AUTO: 0.05 K/UL — SIGNIFICANT CHANGE UP (ref 0–0.5)
EOSINOPHIL NFR BLD AUTO: 0.5 % — SIGNIFICANT CHANGE UP (ref 0–6)
GLUCOSE SERPL-MCNC: 107 MG/DL — HIGH (ref 70–99)
HCT VFR BLD CALC: 36.8 % — SIGNIFICANT CHANGE UP (ref 34.5–45)
HGB BLD-MCNC: 11.4 G/DL — LOW (ref 11.5–15.5)
IMM GRANULOCYTES NFR BLD AUTO: 1.3 % — SIGNIFICANT CHANGE UP (ref 0–1.5)
LYMPHOCYTES # BLD AUTO: 1.53 K/UL — SIGNIFICANT CHANGE UP (ref 1–3.3)
LYMPHOCYTES # BLD AUTO: 14.4 % — SIGNIFICANT CHANGE UP (ref 13–44)
MAGNESIUM SERPL-MCNC: 2.1 MG/DL — SIGNIFICANT CHANGE UP (ref 1.6–2.6)
MCHC RBC-ENTMCNC: 27.7 PG — SIGNIFICANT CHANGE UP (ref 27–34)
MCHC RBC-ENTMCNC: 31 GM/DL — LOW (ref 32–36)
MCV RBC AUTO: 89.5 FL — SIGNIFICANT CHANGE UP (ref 80–100)
MONOCYTES # BLD AUTO: 0.97 K/UL — HIGH (ref 0–0.9)
MONOCYTES NFR BLD AUTO: 9.1 % — SIGNIFICANT CHANGE UP (ref 2–14)
NEUTROPHILS # BLD AUTO: 7.93 K/UL — HIGH (ref 1.8–7.4)
NEUTROPHILS NFR BLD AUTO: 74.5 % — SIGNIFICANT CHANGE UP (ref 43–77)
NRBC # BLD: 0 /100 WBCS — SIGNIFICANT CHANGE UP (ref 0–0)
PHOSPHATE SERPL-MCNC: 3 MG/DL — SIGNIFICANT CHANGE UP (ref 2.5–4.5)
PLATELET # BLD AUTO: 690 K/UL — HIGH (ref 150–400)
POTASSIUM SERPL-MCNC: 4.2 MMOL/L — SIGNIFICANT CHANGE UP (ref 3.5–5.3)
POTASSIUM SERPL-SCNC: 4.2 MMOL/L — SIGNIFICANT CHANGE UP (ref 3.5–5.3)
PROT SERPL-MCNC: 6 G/DL — SIGNIFICANT CHANGE UP (ref 6–8.3)
RBC # BLD: 4.11 M/UL — SIGNIFICANT CHANGE UP (ref 3.8–5.2)
RBC # FLD: 14.3 % — SIGNIFICANT CHANGE UP (ref 10.3–14.5)
SODIUM SERPL-SCNC: 140 MMOL/L — SIGNIFICANT CHANGE UP (ref 135–145)
WBC # BLD: 10.64 K/UL — HIGH (ref 3.8–10.5)
WBC # FLD AUTO: 10.64 K/UL — HIGH (ref 3.8–10.5)

## 2020-01-17 PROCEDURE — 99232 SBSQ HOSP IP/OBS MODERATE 35: CPT

## 2020-01-17 PROCEDURE — 99233 SBSQ HOSP IP/OBS HIGH 50: CPT

## 2020-01-17 PROCEDURE — 76604 US EXAM CHEST: CPT | Mod: 26

## 2020-01-17 PROCEDURE — 71045 X-RAY EXAM CHEST 1 VIEW: CPT | Mod: 26

## 2020-01-17 RX ORDER — MIRTAZAPINE 45 MG/1
15 TABLET, ORALLY DISINTEGRATING ORAL AT BEDTIME
Refills: 0 | Status: DISCONTINUED | OUTPATIENT
Start: 2020-01-17 | End: 2020-01-24

## 2020-01-17 RX ORDER — MORPHINE SULFATE 50 MG/1
0.5 CAPSULE, EXTENDED RELEASE ORAL ONCE
Refills: 0 | Status: DISCONTINUED | OUTPATIENT
Start: 2020-01-17 | End: 2020-01-17

## 2020-01-17 RX ORDER — OLANZAPINE 15 MG/1
2.5 TABLET, FILM COATED ORAL EVERY 12 HOURS
Refills: 0 | Status: DISCONTINUED | OUTPATIENT
Start: 2020-01-17 | End: 2020-01-21

## 2020-01-17 RX ORDER — HYDROCHLOROTHIAZIDE 25 MG
25 TABLET ORAL DAILY
Refills: 0 | Status: DISCONTINUED | OUTPATIENT
Start: 2020-01-17 | End: 2020-01-20

## 2020-01-17 RX ORDER — OLANZAPINE 15 MG/1
2.5 TABLET, FILM COATED ORAL ONCE
Refills: 0 | Status: COMPLETED | OUTPATIENT
Start: 2020-01-17 | End: 2020-01-17

## 2020-01-17 RX ADMIN — Medication 1 PACKET(S): at 21:24

## 2020-01-17 RX ADMIN — LIDOCAINE 1 APPLICATION(S): 4 CREAM TOPICAL at 21:30

## 2020-01-17 RX ADMIN — Medication 1 PACKET(S): at 05:30

## 2020-01-17 RX ADMIN — ZINC OXIDE 1 APPLICATION(S): 200 OINTMENT TOPICAL at 17:12

## 2020-01-17 RX ADMIN — LIDOCAINE 1 APPLICATION(S): 4 CREAM TOPICAL at 05:35

## 2020-01-17 RX ADMIN — DEXMEDETOMIDINE HYDROCHLORIDE IN 0.9% SODIUM CHLORIDE 4.08 MICROGRAM(S)/KG/HR: 4 INJECTION INTRAVENOUS at 06:10

## 2020-01-17 RX ADMIN — AER TRAVELER 1 APPLICATION(S): 0.5 SOLUTION RECTAL; TOPICAL at 05:32

## 2020-01-17 RX ADMIN — LIDOCAINE 1 APPLICATION(S): 4 CREAM TOPICAL at 13:32

## 2020-01-17 RX ADMIN — Medication 1 APPLICATION(S): at 09:49

## 2020-01-17 RX ADMIN — Medication 500000 UNIT(S): at 05:35

## 2020-01-17 RX ADMIN — MORPHINE SULFATE 0.5 MILLIGRAM(S): 50 CAPSULE, EXTENDED RELEASE ORAL at 10:06

## 2020-01-17 RX ADMIN — Medication 650 MILLIGRAM(S): at 05:37

## 2020-01-17 RX ADMIN — OXCARBAZEPINE 300 MILLIGRAM(S): 300 TABLET, FILM COATED ORAL at 08:45

## 2020-01-17 RX ADMIN — Medication 1 APPLICATION(S): at 21:30

## 2020-01-17 RX ADMIN — PANTOPRAZOLE SODIUM 40 MILLIGRAM(S): 20 TABLET, DELAYED RELEASE ORAL at 12:13

## 2020-01-17 RX ADMIN — Medication 1 PACKET(S): at 13:33

## 2020-01-17 RX ADMIN — DIPHENHYDRAMINE HYDROCHLORIDE AND LIDOCAINE HYDROCHLORIDE AND ALUMINUM HYDROXIDE AND MAGNESIUM HYDRO 5 MILLILITER(S): KIT at 05:30

## 2020-01-17 RX ADMIN — AER TRAVELER 1 APPLICATION(S): 0.5 SOLUTION RECTAL; TOPICAL at 13:32

## 2020-01-17 RX ADMIN — Medication 1000 MILLIGRAM(S): at 21:31

## 2020-01-17 RX ADMIN — MORPHINE SULFATE 0.5 MILLIGRAM(S): 50 CAPSULE, EXTENDED RELEASE ORAL at 09:48

## 2020-01-17 RX ADMIN — Medication 650 MILLIGRAM(S): at 06:07

## 2020-01-17 RX ADMIN — AER TRAVELER 1 APPLICATION(S): 0.5 SOLUTION RECTAL; TOPICAL at 22:21

## 2020-01-17 RX ADMIN — Medication 1 SUPPOSITORY(S): at 05:30

## 2020-01-17 RX ADMIN — Medication 1 TABLET(S): at 08:45

## 2020-01-17 RX ADMIN — OLANZAPINE 2.5 MILLIGRAM(S): 15 TABLET, FILM COATED ORAL at 15:49

## 2020-01-17 RX ADMIN — Medication 60 MILLIGRAM(S): at 05:35

## 2020-01-17 RX ADMIN — Medication 1 GRAM(S): at 05:30

## 2020-01-17 RX ADMIN — ENOXAPARIN SODIUM 40 MILLIGRAM(S): 100 INJECTION SUBCUTANEOUS at 12:11

## 2020-01-17 RX ADMIN — OXCARBAZEPINE 300 MILLIGRAM(S): 300 TABLET, FILM COATED ORAL at 21:24

## 2020-01-17 RX ADMIN — Medication 1 TABLET(S): at 12:10

## 2020-01-17 RX ADMIN — Medication 1 SUPPOSITORY(S): at 17:11

## 2020-01-17 RX ADMIN — Medication 0.5 MILLIGRAM(S): at 14:39

## 2020-01-17 RX ADMIN — TIOTROPIUM BROMIDE 1 CAPSULE(S): 18 CAPSULE ORAL; RESPIRATORY (INHALATION) at 12:14

## 2020-01-17 RX ADMIN — Medication 500000 UNIT(S): at 12:10

## 2020-01-17 RX ADMIN — MIRTAZAPINE 15 MILLIGRAM(S): 45 TABLET, ORALLY DISINTEGRATING ORAL at 22:21

## 2020-01-17 RX ADMIN — ZINC OXIDE 1 APPLICATION(S): 200 OINTMENT TOPICAL at 05:33

## 2020-01-17 RX ADMIN — Medication 25 MILLIGRAM(S): at 14:31

## 2020-01-17 RX ADMIN — DIPHENHYDRAMINE HYDROCHLORIDE AND LIDOCAINE HYDROCHLORIDE AND ALUMINUM HYDROXIDE AND MAGNESIUM HYDRO 5 MILLILITER(S): KIT at 12:11

## 2020-01-17 NOTE — PROGRESS NOTE ADULT - SUBJECTIVE AND OBJECTIVE BOX
INTERVAL HPI/OVERNIGHT EVENTS:  pt seen and examined, dtr present  on hi flow, ct out  denies n/v/abd pain  passing brown bms per rn    MEDICATIONS  (STANDING):  dexMEDEtomidine Infusion 0.3 MICROgram(s)/kG/Hr (4.08 mL/Hr) IV Continuous <Continuous>  dextrose 5%. 1000 milliLiter(s) (50 mL/Hr) IV Continuous <Continuous>  dextrose 50% Injectable 12.5 Gram(s) IV Push once  dextrose 50% Injectable 25 Gram(s) IV Push once  dextrose 50% Injectable 25 Gram(s) IV Push once  enoxaparin Injectable 40 milliGRAM(s) SubCutaneous daily  FIRST- Mouthwash  BLM 5 milliLiter(s) Swish and Spit four times a day  hydrocortisone 2.5% Rectal Cream 1 Application(s) Rectal two times a day  hydrocortisone hemorrhoidal Suppository 1 Suppository(s) Rectal two times a day  influenza   Vaccine 0.5 milliLiter(s) IntraMuscular once  insulin lispro (HumaLOG) corrective regimen sliding scale   SubCutaneous three times a day before meals  insulin lispro (HumaLOG) corrective regimen sliding scale   SubCutaneous at bedtime  lactobacillus acidophilus 1 Tablet(s) Oral three times a day with meals  lidocaine 2% Gel 1 Application(s) Topical three times a day  mesalamine Suppository 1000 milliGRAM(s) Rectal at bedtime  nystatin    Suspension 393265 Unit(s) Swish and Swallow four times a day  OXcarbazepine 300 milliGRAM(s) Oral two times a day  pantoprazole  Injectable 40 milliGRAM(s) IV Push daily  potassium phosphate / sodium phosphate powder 1 Packet(s) Oral three times a day  predniSONE   Tablet 40 milliGRAM(s) Oral daily  propranolol 20 milliGRAM(s) Oral three times a day  sucralfate 1 Gram(s) Oral two times a day  tiotropium 18 MICROgram(s) Capsule 1 Capsule(s) Inhalation daily  witch hazel Pads 1 Application(s) Topical three times a day  zinc oxide 20% Ointment 1 Application(s) Topical two times a day    MEDICATIONS  (PRN):  acetaminophen   Tablet .. 650 milliGRAM(s) Oral every 6 hours PRN Temp greater or equal to 38C (100.4F), Mild Pain (1 - 3)  ALBUTerol    0.083% 2.5 milliGRAM(s) Nebulizer every 6 hours PRN Shortness of Breath and/or Wheezing  dextrose 40% Gel 15 Gram(s) Oral once PRN Blood Glucose LESS THAN 70 milliGRAM(s)/deciliter  glucagon  Injectable 1 milliGRAM(s) IntraMuscular once PRN Glucose LESS THAN 70 milligrams/deciliter  LORazepam     Tablet 2 milliGRAM(s) Oral every 6 hours PRN Anxiety  LORazepam   Injectable 1 milliGRAM(s) IV Push every 6 hours PRN Anxiety  morphine  - Injectable 1 milliGRAM(s) IV Push every 4 hours PRN Severe Pain (7 - 10)  ondansetron    Tablet 4 milliGRAM(s) Oral every 12 hours PRN Nausea  simethicone 80 milliGRAM(s) Chew two times a day PRN Gas      Allergies    IV Contrast (Anaphylaxis)  shellfish. (Anaphylaxis)    Intolerances        Review of Systems:    General:  No wt loss, fevers, chills, night sweats, fatigue   Eyes:  Good vision, no reported pain  ENT:  No sore throat, pain, runny nose, dysphagia  CV:  No pain, palpitations, hypo/hypertension  Resp:  No dyspnea, cough, tachypnea, wheezing  GI:  No pain, No nausea, No vomiting, No diarrhea, No constipation, No weight loss, No fever, No pruritis, No rectal bleeding, No melena, No dysphagia  :  No pain, bleeding, incontinence, nocturia  Muscle:  No pain, weakness  Neuro:  No weakness, tingling, memory problems  Psych:  No fatigue, insomnia, mood problems, depression  Endocrine:  No polyuria, polydypsia, cold/heat intolerance  Heme:  No petechiae, ecchymosis, easy bruisability  Skin:  No rash, tattoos, scars, edema      Vital Signs Last 24 Hrs  T(C): 36.4 (17 Jan 2020 07:52), Max: 36.7 (16 Jan 2020 23:53)  T(F): 97.5 (17 Jan 2020 07:52), Max: 98.1 (16 Jan 2020 23:53)  HR: 89 (17 Jan 2020 09:00) (58 - 104)  BP: 145/84 (17 Jan 2020 09:00) (92/66 - 177/92)  BP(mean): 107 (17 Jan 2020 09:00) (70 - 141)  RR: 27 (17 Jan 2020 09:00) (21 - 39)  SpO2: 96% (17 Jan 2020 09:00) (91% - 100%)    PHYSICAL EXAM:    Constitutional: lying in bed on hi flow  HEENT: ncat  Neck: No LAD  Gastrointestinal: soft nt nd  Extremities: No peripheral edema  Neurological: awake alert responds appropriately        LABS:                        11.4   10.64 )-----------( 690      ( 17 Jan 2020 05:38 )             36.8     01-17    140  |  96  |  18  ----------------------------<  107<H>  4.2   |  41<H>  |  0.41<L>    Ca    9.0      17 Jan 2020 05:38  Phos  3.0     01-17  Mg     2.1     01-17    TPro  6.0  /  Alb  2.6<L>  /  TBili  0.2  /  DBili  x   /  AST  17  /  ALT  28  /  AlkPhos  81  01-17          RADIOLOGY & ADDITIONAL TESTS:

## 2020-01-17 NOTE — PROGRESS NOTE ADULT - ASSESSMENT
59F with PMH of interstitial lung disease hx of pneumothorax (on 2L NC at home), hx pulmonary nodules, Meniere's disease, Non-Hodgkin's lymphoma (SCD/XRT/chemo at [facility] 2003), hx of CVA (3/2019 - residual L sided weakness, unable to use her L arm), not on ASA or Plavix due to GIB, Seizure disorder, tachycardia, MVP, hx of chronic SDH admitted with  severe sepsis 2/2 superimposed PNA resolving, Acute on chronic hypoxemic respiratory failure requiring high flow NC, PTX s/p chest tube removed 1/16/20    Neuro: Anxiety - Psych Dr Morales consulted, start remeron qhs, ativan 1mg q4. DC precedex. Seizure Disorder - continue trileptal last level 1/10 wnl.  Cardio: Continue propanolol, will start HCTZ for HTN.   Pulm: Acute on chronic hypoxemic respiratory failure 2/2 ILD and HCAP. Abx completed. Apical PTX unchanged. Steroid tapered to 40qd. On High flow, FiO2 titrated down to 35% FiO2, pt maintaining good O2 sat. Titrate and start NC. f/u repeat CXR this am s/p CT removal yesterday.   ID: Zosyn completed for PNA, continue nystatin swish and swallow for oral thrush  GI: continue encouraging PO intake, Dys 1 Pureed-nectar thick diet, continue mesalamine and witch hazel for colitis.  Endo: dc routine Accu-Cheks, dc low dose sliding scale insulin coverage  Renal/lytes: normal renal function  Heme/Onc: DVT ppx with Lovenox  Dispo: Full code, pt is stable for transfer to tele floor today

## 2020-01-17 NOTE — BEHAVIORAL HEALTH ASSESSMENT NOTE - SUICIDE PROTECTIVE FACTORS
Has future plans/Fear of death or the actual act of killing self/Responsibility to family and others/Supportive social network of family or friends/Identifies reasons for living

## 2020-01-17 NOTE — PROGRESS NOTE ADULT - PROBLEM SELECTOR PLAN 1
on high flow NC -   thoracic follow up reviewed - ct out - monitor vs and HD and Sat - cxr reviewed  chronic lung disease - ILD - on emp Steroids - IV at the moment  ID follow up reviewed - completed emp ABX   I and O  ICU supportive care and regimen  prognosis remains POOR  will follow

## 2020-01-17 NOTE — BEHAVIORAL HEALTH ASSESSMENT NOTE - NSBHCHARTREVIEWINVESTIGATE_PSY_A_CORE FT
< from: 12 Lead ECG (01.08.20 @ 19:12) >    Ventricular Rate 129 BPM    Atrial Rate 129 BPM    P-R Interval 134 ms    QRS Duration 78 ms    Q-T Interval 308 ms    QTC Calculation(Bezet) 451 ms    P Axis 50 degrees    R Axis 34 degrees    T Axis 4 degrees    Diagnosis Line *** Poor data quality, interpretation may be adversely affected  Sinus tachycardia  Nonspecific ST and T wave abnormality  Abnormal ECG  When compared with ECG of 08-JAN-2020 05:17,  No significant change was found  Confirmed by VIOLA CASTELLANOS ([id number]) on 1/9/2020 2:22:09 PM    < end of copied text >

## 2020-01-17 NOTE — BEHAVIORAL HEALTH ASSESSMENT NOTE - NSBHCHARTREVIEWVS_PSY_A_CORE FT
Vital Signs Last 24 Hrs  T(C): 36.9 (17 Jan 2020 12:15), Max: 36.9 (17 Jan 2020 12:15)  T(F): 98.5 (17 Jan 2020 12:15), Max: 98.5 (17 Jan 2020 12:15)  HR: 92 (17 Jan 2020 12:00) (58 - 104)  BP: 181/98 (17 Jan 2020 12:00) (92/66 - 181/98)  BP(mean): 132 (17 Jan 2020 12:00) (70 - 141)  RR: 31 (17 Jan 2020 12:00) (21 - 39)  SpO2: 95% (17 Jan 2020 12:00) (91% - 100%)

## 2020-01-17 NOTE — PROGRESS NOTE ADULT - SUBJECTIVE AND OBJECTIVE BOX
Neurology follow up note    ROMIE [id number]      Interval History:    Patient feels ok no new complaints seen with daughter breathing stable had poor sleep last night only for few hours     MEDICATIONS    acetaminophen   Tablet .. 650 milliGRAM(s) Oral every 6 hours PRN  ALBUTerol    0.083% 2.5 milliGRAM(s) Nebulizer every 6 hours PRN  dexMEDEtomidine Infusion 0.3 MICROgram(s)/kG/Hr IV Continuous <Continuous>  dextrose 40% Gel 15 Gram(s) Oral once PRN  dextrose 5%. 1000 milliLiter(s) IV Continuous <Continuous>  dextrose 50% Injectable 12.5 Gram(s) IV Push once  dextrose 50% Injectable 25 Gram(s) IV Push once  dextrose 50% Injectable 25 Gram(s) IV Push once  enoxaparin Injectable 40 milliGRAM(s) SubCutaneous daily  FIRST- Mouthwash  BLM 5 milliLiter(s) Swish and Spit four times a day  glucagon  Injectable 1 milliGRAM(s) IntraMuscular once PRN  hydrocortisone 2.5% Rectal Cream 1 Application(s) Rectal two times a day  hydrocortisone hemorrhoidal Suppository 1 Suppository(s) Rectal two times a day  influenza   Vaccine 0.5 milliLiter(s) IntraMuscular once  insulin lispro (HumaLOG) corrective regimen sliding scale   SubCutaneous three times a day before meals  insulin lispro (HumaLOG) corrective regimen sliding scale   SubCutaneous at bedtime  lactobacillus acidophilus 1 Tablet(s) Oral three times a day with meals  lidocaine 2% Gel 1 Application(s) Topical three times a day  LORazepam     Tablet 2 milliGRAM(s) Oral every 6 hours PRN  LORazepam   Injectable 1 milliGRAM(s) IV Push every 6 hours PRN  mesalamine Suppository 1000 milliGRAM(s) Rectal at bedtime  morphine  - Injectable 1 milliGRAM(s) IV Push every 4 hours PRN  nystatin    Suspension [id number] Unit(s) Swish and Swallow four times a day  ondansetron    Tablet 4 milliGRAM(s) Oral every 12 hours PRN  OXcarbazepine 300 milliGRAM(s) Oral two times a day  pantoprazole  Injectable 40 milliGRAM(s) IV Push daily  potassium phosphate / sodium phosphate powder 1 Packet(s) Oral three times a day  predniSONE   Tablet 40 milliGRAM(s) Oral daily  propranolol 20 milliGRAM(s) Oral three times a day  simethicone 80 milliGRAM(s) Chew two times a day PRN  sucralfate 1 Gram(s) Oral two times a day  tiotropium 18 MICROgram(s) Capsule 1 Capsule(s) Inhalation daily  witch hazel Pads 1 Application(s) Topical three times a day  zinc oxide 20% Ointment 1 Application(s) Topical two times a day      Allergies    IV Contrast (Anaphylaxis)  shellfish. (Anaphylaxis)    Intolerances            Vital Signs Last 24 Hrs  T(C): 36.4 (17 Jan 2020 07:52), Max: 36.7 (16 Jan 2020 23:53)  T(F): 97.5 (17 Jan 2020 07:52), Max: 98.1 (16 Jan 2020 23:53)  HR: 80 (17 Jan 2020 10:00) (58 - 104)  BP: 133/85 (17 Jan 2020 10:00) (92/66 - 177/92)  BP(mean): 104 (17 Jan 2020 10:00) (70 - 141)  RR: 26 (17 Jan 2020 10:00) (21 - 39)  SpO2: 92% (17 Jan 2020 10:00) (91% - 100%)  REVIEW OF SYSTEMS:  Constitutional:  The patient denies fever, chills, or night sweats.  Head:  Occ headaches.  Eyes:  No double vision or blurry vision.  Ears:  No ringing in the ears.  Neck:  No neck pain.  Respiratory:  Occasional cough with shortness of breath.  Cardiovascular:  no chest pain.  Abdomen:  occ nausea,  no vomiting, or abdominal pain.  Extremities/Neurological:  No numbness or tingling.  Musculoskeletal:  Occasional joint pain.    PHYSICAL EXAMINATION:   HEENT:  Head:  Normocephalic, atraumatic.  Eyes:  No scleral icterus.  Ears:  Hearing bilaterally appeared to be intact.  NECK:  Supple.  RESPIRATORY:  Decreased breath sounds bilaterally, but most prominent on the right.  CARDIOVASCULAR:  S1 and S2 heard.  ABDOMEN:  Soft and nontender.  Extremities:  No clubbing or cyanosis were noted.      NEUROLOGIC:  The patient is awake and alert.    Extraocular movements were intact.  Pupils were equal, round, and reactive bilaterally 3 mm to 2 mm.  Speech was fluent.  Smile was symmetric.  Motor:  Right upper was 5/5, left upper  arm brace was 3/5 decrease rom hand and finger in flexed position , right lower was 4/5, left lower was 3+/5.  As per my conversation with the spouse, after her apparent event back in March, questionable seizure versus questionable stroke.  She was left with left-sided weakness.  Sensory:  Bilateral upper and lower appeared intact to light touch.                LABS:  CBC Full  -  ( 17 Jan 2020 05:38 )  WBC Count : 10.64 K/uL  RBC Count : 4.11 M/uL  Hemoglobin : 11.4 g/dL  Hematocrit : 36.8 %  Platelet Count - Automated : 690 K/uL  Mean Cell Volume : 89.5 fl  Mean Cell Hemoglobin : 27.7 pg  Mean Cell Hemoglobin Concentration : 31.0 gm/dL  Auto Neutrophil # : 7.93 K/uL  Auto Lymphocyte # : 1.53 K/uL  Auto Monocyte # : 0.97 K/uL  Auto Eosinophil # : 0.05 K/uL  Auto Basophil # : 0.02 K/uL  Auto Neutrophil % : 74.5 %  Auto Lymphocyte % : 14.4 %  Auto Monocyte % : 9.1 %  Auto Eosinophil % : 0.5 %  Auto Basophil % : 0.2 %      01-17    140  |  96  |  18  ----------------------------<  107<H>  4.2   |  41<H>  |  0.41<L>    Ca    9.0      17 Jan 2020 05:38  Phos  3.0     01-17  Mg     2.1     01-17    TPro  6.0  /  Alb  2.6<L>  /  TBili  0.2  /  DBili  x   /  AST  17  /  ALT  28  /  AlkPhos  81  01-17    Hemoglobin A1C:     LIVER FUNCTIONS - ( 17 Jan 2020 05:38 )  Alb: 2.6 g/dL / Pro: 6.0 g/dL / ALK PHOS: 81 U/L / ALT: 28 U/L / AST: 17 U/L / GGT: x           Vitamin B12         RADIOLOGY        ANALYSIS AND PLAN:  This is a 59-year-old with a history of possibly epilepsy verse TIAs, history of chronic subdural hydromas and change in mental status.    1.	For episode of change in mental status, as per my conversation with the spouse, these appear to occur primarily at the same time at night for the last three to four weeks.  The patient has been on Trileptal for about eight to nine months.  Suspect less likely this is Trileptal, Questionable, the patient could have any type of sleep-related disorder causing these events to occur at night.  I spoke with the spouse, the patient should undergo sleep studies.  2.	For history of chronic subdural hematoma, these appeared to have resolved from previous MRI.  3.	For history of possible underlying epilepsy, for now, I will continue the patient on her Trileptal..  4.	Monitor CO2 levels as needed and respiratory status   5.	spoke outside neurologist Dr. Wallace in past agrees to continue trileptal for now  His telephone number is [phone].  6.	Spouse's name is Marialuisa, his telephone number is [phone] spoke to him at bedside 1/13/2020  7.	steroids as needed   8.	H/O R pigtail placed by thoracic surgery   9.	no new events  10.	seen with daughter today 1/17/2020  11.	Greater than 25 minutes of time was spent with the patient, plan of care, reviewing data, speaking to the family and multidisciplinary healthcare team  12.	Greater than 16 minutes of time was spent with the patient, plan of care, reviewing data, speaking to the family and multidisciplinary healthcare team

## 2020-01-17 NOTE — BEHAVIORAL HEALTH ASSESSMENT NOTE - SUMMARY
59 MWF, domiciled, disabled, no prior psychiatric hospitalizations or significant treatment, with PMH of interstitial lung disease hx of pneumothorax (on 2L NC at home), hx pulmonary nodules, Meniere's disease, Non-Hodgkin's lymphoma (SCD/XRT/chemo at [facility] 2003), hx of CVA (3/2019 - residual L sided weakness, unable to use her L arm), not on ASA or Plavix due to GIB, Seizure disorder, tachycardia, MVP, hx of chronic SDH, presents with weakness, weight loss, nausea, and diarrhea for the past month.   Patient has been treated for pneumothorax and at this time presents with prominent anxiety in context of worrying of running ot of air. She has been on steroids and as per daughter her hallucinations appear to be steroid induced.    IMP: Adjustment disorder with anxiety    REC: Remeron sol-tab 15 mg po at HS, Ativan 0.5 mg IV q 4 hourly

## 2020-01-17 NOTE — BEHAVIORAL HEALTH ASSESSMENT NOTE - NSBHCHARTREVIEWIMAGING_PSY_A_CORE FT
< from: Xray Chest 1 View- PORTABLE-Urgent (01.16.20 @ 14:50) >    EXAM:  XR CHEST PORTABLE URGENT 1V                            PROCEDURE DATE:  01/16/2020          INTERPRETATION:  AP chest on January 16, 2020 1:59 PM. Patient had removal of chest tube.    Heart is magnified by technique.    Diffuse interstitialprocess throughout all lung fields again noted.    Catheter chest tube at the right base seen on January 16 earlier study has been removed.    There is a stable mild right pneumothorax.    Lung fields are unchanged.    IMPRESSION: Right chest tube removed. Stable findings as above.                KAMAR CANO M.D., ATTENDING RADIOLOGIST  This document has been electronically signed. Jan 16 2020  2:54PM    < end of copied text >

## 2020-01-17 NOTE — BEHAVIORAL HEALTH ASSESSMENT NOTE - HPI (INCLUDE ILLNESS QUALITY, SEVERITY, DURATION, TIMING, CONTEXT, MODIFYING FACTORS, ASSOCIATED SIGNS AND SYMPTOMS)
Patient seen, evaluated and chart reviewed. Patient is a 59 MWF, domiciled, disabled, no prior psychiatric hospitalizations or significant treatment, with PMH of interstitial lung disease hx of pneumothorax (on 2L NC at home), hx pulmonary nodules, Meniere's disease, Non-Hodgkin's lymphoma (SCD/XRT/chemo at [facility] 2003), hx of CVA (3/2019 - residual L sided weakness, unable to use her L arm), not on ASA or Plavix due to GIB, Seizure disorder, tachycardia, MVP, hx of chronic SDH, presents with weakness, weight loss, nausea, and diarrhea for the past month. Patient admits to 4 lb weight loss in 3 weeks, was seen by her PCP and started on Zofran one week ago. Patient reports going to wound care prior to admission for bed sores, and was advised ED evaluation. Patient also has felt too weak to get out of bed and started using a diaper to urinate/have BMs. Has mainly been wheelchair bound recently due to weakness, but at baseline patient is able to stand and walk short distances. Patient uses 2L NC at home and noticed her SpO2 at 75% on RA with ambulation. Patient was also switched to Oxcarbazepine 2-3 months ago and noticed her symptoms of anxiety, weakness, and "memory" have worsened.  Of note, at night when patient takes her Oxcarbazepine, she starts yelling and having "hallucinations."  Patient has been treated for pneumothorax and at this time presents with prominent anxiety in context of worrying of running ot of air. She has been on steroids and as per daughter her hallucinations appear to be steroid induced.

## 2020-01-17 NOTE — PROGRESS NOTE ADULT - ATTENDING COMMENTS
59F h/o ILD (unknown etiology, no Bx), chronic hypoxemic respiratory failure, known chronic R PTX which has been conservatively managed, remote NHL (s/p XRT, chemo and SCT), previous CVA with R sided weakness, seizure do on trileptal, chronic SDH v hygromas admitted 12/26 with colitis/proctitis and treated with CTX and flagyl.  Hospital course complicated by development of HCAP, acute on chronic hypoxemic respiratory failure requiring high flow NC.    Neuro: delirium likely steroid induced, pt also not sleeping at night which likely contributing, element of ICU delirium as well - psych consulted for better regimen, weaned from precedex; will start ativan and remeron, continue trileptal   CV: hydrochlorothiazide added for BP contril, continue home propanolol   Pulm: chest tube removed yesterday, R sided pneumo remains unchanged; hypoxemic respiratory failure, multifactorial from HCAP, ILD, improved s/p 3 days of  pulse dose steroids, continued on prednisone 40mg daily; continue duonebs, mucomyst, hypertonic for secretions clearance; weaned down to highflow of 35/25 and doing well, can likely transition to nasal canula, goal O2 sat > 88-90   GI: tolerating PO diet, appetite improving; continue mesalamine and witch hazel for colitits  Renal: no acute issues  ID: zosyn course completed 1/14; continue FRST mouthwash for oral thrush for 7 day course   Dispo: pt stable for transfer to tele floor at this time

## 2020-01-17 NOTE — PROGRESS NOTE ADULT - SUBJECTIVE AND OBJECTIVE BOX
infectious diseases progress note:    ROMIE VIRAMONTES is a 59y y. o. Female patient    Patient with no concerning overnight events    Allergies    IV Contrast (Anaphylaxis)  shellfish. (Anaphylaxis)    Intolerances        ANTIBIOTICS/RELEVANT:  antimicrobials  nystatin    Suspension 368104 Unit(s) Swish and Swallow four times a day    immunologic:  influenza   Vaccine 0.5 milliLiter(s) IntraMuscular once    OTHER:  acetaminophen   Tablet .. 650 milliGRAM(s) Oral every 6 hours PRN  ALBUTerol    0.083% 2.5 milliGRAM(s) Nebulizer every 6 hours PRN  dexMEDEtomidine Infusion 0.3 MICROgram(s)/kG/Hr IV Continuous <Continuous>  dextrose 40% Gel 15 Gram(s) Oral once PRN  dextrose 5%. 1000 milliLiter(s) IV Continuous <Continuous>  dextrose 50% Injectable 12.5 Gram(s) IV Push once  dextrose 50% Injectable 25 Gram(s) IV Push once  dextrose 50% Injectable 25 Gram(s) IV Push once  enoxaparin Injectable 40 milliGRAM(s) SubCutaneous daily  FIRST- Mouthwash  BLM 5 milliLiter(s) Swish and Spit four times a day  glucagon  Injectable 1 milliGRAM(s) IntraMuscular once PRN  hydrocortisone 2.5% Rectal Cream 1 Application(s) Rectal two times a day  hydrocortisone hemorrhoidal Suppository 1 Suppository(s) Rectal two times a day  insulin lispro (HumaLOG) corrective regimen sliding scale   SubCutaneous three times a day before meals  insulin lispro (HumaLOG) corrective regimen sliding scale   SubCutaneous at bedtime  lactobacillus acidophilus 1 Tablet(s) Oral three times a day with meals  lidocaine 2% Gel 1 Application(s) Topical three times a day  LORazepam     Tablet 2 milliGRAM(s) Oral every 6 hours PRN  LORazepam   Injectable 1 milliGRAM(s) IV Push every 6 hours PRN  mesalamine Suppository 1000 milliGRAM(s) Rectal at bedtime  morphine  - Injectable 1 milliGRAM(s) IV Push every 4 hours PRN  ondansetron    Tablet 4 milliGRAM(s) Oral every 12 hours PRN  OXcarbazepine 300 milliGRAM(s) Oral two times a day  pantoprazole  Injectable 40 milliGRAM(s) IV Push daily  potassium phosphate / sodium phosphate powder 1 Packet(s) Oral three times a day  predniSONE   Tablet 40 milliGRAM(s) Oral daily  propranolol 20 milliGRAM(s) Oral three times a day  simethicone 80 milliGRAM(s) Chew two times a day PRN  sucralfate 1 Gram(s) Oral two times a day  tiotropium 18 MICROgram(s) Capsule 1 Capsule(s) Inhalation daily  witch hazel Pads 1 Application(s) Topical three times a day  zinc oxide 20% Ointment 1 Application(s) Topical two times a day      Objective:  Vital Signs Last 24 Hrs  T(C): 36.4 (17 Jan 2020 07:52), Max: 36.7 (16 Jan 2020 23:53)  T(F): 97.5 (17 Jan 2020 07:52), Max: 98.1 (16 Jan 2020 23:53)  HR: 70 (17 Jan 2020 06:00) (58 - 104)  BP: 128/73 (17 Jan 2020 06:00) (92/66 - 177/92)  BP(mean): 96 (17 Jan 2020 06:00) (70 - 141)  RR: 25 (17 Jan 2020 06:00) (20 - 39)  SpO2: 95% (17 Jan 2020 06:00) (91% - 100%)    T(C): 36.4 (01-17-20 @ 07:52), Max: 36.7 (01-16-20 @ 23:53)  T(C): 36.4 (01-17-20 @ 07:52), Max: 36.7 (01-15-20 @ 03:26)  T(C): 36.4 (01-17-20 @ 07:52), Max: 37.1 (01-13-20 @ 23:40)    PHYSICAL EXAM:  Constitutional: Well-developed, well nourished  Eyes: PERRLA, EOMI  Ear/Nose/Throat: oropharynx normal	  Neck: no JVD, no lymphadenopathy, supple  Respiratory: no accessory muscle use  Cardiovascular: RRR,   Gastrointestinal: soft, NT  Extremities: no clubbing, no cyanosis, edema absent      LABS:                        11.4   10.64 )-----------( 690      ( 17 Jan 2020 05:38 )             36.8       10.64 01-17 @ 05:38  8.36 01-16 @ 05:43  8.54 01-15 @ 05:19  9.59 01-14 @ 05:41  12.57 01-13 @ 05:28  17.94 01-12 @ 05:40  8.62 01-11 @ 05:54      01-17    140  |  96  |  18  ----------------------------<  107<H>  4.2   |  41<H>  |  0.41<L>    Ca    9.0      17 Jan 2020 05:38  Phos  3.0     01-17  Mg     2.1     01-17    TPro  6.0  /  Alb  2.6<L>  /  TBili  0.2  /  DBili  x   /  AST  17  /  ALT  28  /  AlkPhos  81  01-17      Creatinine, Serum: 0.41 mg/dL (01-17-20 @ 05:38)  Creatinine, Serum: 0.37 mg/dL (01-16-20 @ 05:43)  Creatinine, Serum: 0.34 mg/dL (01-15-20 @ 05:19)  Creatinine, Serum: 0.38 mg/dL (01-14-20 @ 05:41)  Creatinine, Serum: 0.41 mg/dL (01-13-20 @ 05:28)  Creatinine, Serum: 0.40 mg/dL (01-12-20 @ 05:40)  Creatinine, Serum: 0.54 mg/dL (01-11-20 @ 15:10)  Creatinine, Serum: 0.35 mg/dL (01-11-20 @ 05:54)                MICROBIOLOGY:              RADIOLOGY & ADDITIONAL STUDIES:

## 2020-01-17 NOTE — PROGRESS NOTE ADULT - SUBJECTIVE AND OBJECTIVE BOX
CHIEF COMPLAINT/INTERVAL HISTORY:  Pt. seen and evaluated for acute hypoxic respiratory failure.  Pt. is in no distress.  On high flow nasal canula.  Tolerating prednisone.  States respiratory status is improving.   Chest tube removed yesterday.    REVIEW OF SYSTEMS:  No fever, CP, or abdominal pain.     Vital Signs Last 24 Hrs  T(C): 36.4 (17 Jan 2020 07:52), Max: 36.7 (16 Jan 2020 23:53)  T(F): 97.5 (17 Jan 2020 07:52), Max: 98.1 (16 Jan 2020 23:53)  HR: 80 (17 Jan 2020 10:00) (58 - 104)  BP: 133/85 (17 Jan 2020 10:00) (92/66 - 177/92)  BP(mean): 104 (17 Jan 2020 10:00) (70 - 141)  RR: 26 (17 Jan 2020 10:00) (21 - 39)  SpO2: 92% (17 Jan 2020 10:00) (91% - 100%)    PHYSICAL EXAM:  GENERAL: NAD  HEENT: EOMI, hearing normal, conjunctiva and sclera clear, high flow nasal canula in place  Chest: Diminished BS at bases, no wheezing  CV: S1S2, RRR,   GI: soft, +BS, NT/ND  Musculoskeletal: no edema, contracture of left wrist  Neuro: chronic weakness of LUE   Psychiatric: affect nL, mood nL  Skin: warm and dry    LABS:                        11.4   10.64 )-----------( 690      ( 17 Jan 2020 05:38 )             36.8     01-17    140  |  96  |  18  ----------------------------<  107<H>  4.2   |  41<H>  |  0.41<L>    Ca    9.0      17 Jan 2020 05:38  Phos  3.0     01-17  Mg     2.1     01-17    TPro  6.0  /  Alb  2.6<L>  /  TBili  0.2  /  DBili  x   /  AST  17  /  ALT  28  /  AlkPhos  81  01-17      Assessment and Plan:  -Septic shock and acute hypoxic respiratory failure 2/2 hospital acquired pneumonia, right pneumothorax, and ILD:  Shock has resolved.  Off vasopressors.  Completed course of IV antibiotics.  s/p removal of chest tube on 1/16.  Continue Prednisone 40mg PO daily, Spiriva inh daily, Albuterol Neb Q6h PRN, and O2 support.  ID, Pulmonary , Thoracic Surgery, and Intensivist f/u  -Colitis:  Completed course of IV antibiotics.  Continue mesalamine 1000mg NM QHS.  GI f/u  -Nausea and GERD:  Zofran 4mg PO Q12h PRN, Simethicone 80mg PO BID PRN, Carafate 1gm PO BID, and Protonix 40mg IV daily.   -Seizure disorder:  continue Trileptal 300mg PO BID.  Neurology f/u  -Thrush:  continue nystatin swish and swallow four times a day  -VTE ppx: Lovenox 40mg SQ daily

## 2020-01-17 NOTE — CHART NOTE - NSCHARTNOTEFT_GEN_A_CORE
Pt s/p 0.5mg ativan IV push now with increased agitation. She is pulling off her pulse ox, High flow O2 and gown.   Called Dr Morales to discuss intolerance of ativan.    - Will trial zyprexa 2.5mg IM x1  - may continue zyprexa prn if pt tolerates it  - If she does not tolerate zyprexa may give haldol  - monitor Respiratory status closely when giving above meds, hold for lethargy/sedation and call MD  - MARISSA ativan, avoid xanax and ativan  - give remeron qhs     Dr Corona  PGY3 Pt s/p 0.5mg ativan IV push now with increased agitation. She is pulling off her pulse ox, High flow O2 and gown.   Vitals with increased HR and BP.  Called Dr Morales to discuss intolerance of ativan.    - Will trial zyprexa 2.5mg IM x1  - may continue zyprexa prn if pt tolerates it  - If she does not tolerate zyprexa may give haldol  - monitor Respiratory status closely when giving above meds, hold for lethargy/sedation and call MD Preston ANN ativan, avoid xanax and ativan  - give remeron qhs     Dr Corona  PGY3

## 2020-01-17 NOTE — PROGRESS NOTE ADULT - PROBLEM SELECTOR PLAN 1
ct showing stercoral colitis c/b rectal pain in setting of hemorrhoids  resolved  monitoring off bowel regimen   cont lido and hydrocortisone cream   cont anusol supp bid, witch hazel pads prn  cont canasa suppository  up to date w colonoscopy, 4/2019 showed only non bleeding internal hemorrhoids; given persistence of symptoms, offered repeat c-scope but pt deferred

## 2020-01-17 NOTE — PROGRESS NOTE ADULT - SUBJECTIVE AND OBJECTIVE BOX
Date/Time Patient Seen:  		  Referring MD:   Data Reviewed	       Patient is a 59y old  Female who presents with a chief complaint of pneumothorax, colitis, UTI (16 Jan 2020 15:56)      Subjective/HPI     PAST MEDICAL & SURGICAL HISTORY:  Interstitial lung disease: on home o2 prn  NHL (non-Hodgkin's lymphoma): Agem 43 sp chemo/rt/stem cell  Transient cerebral ischemia, unspecified type  Mitral prolapse  Pulmonary disease  History of tonsillectomy  History of appendectomy        Medication list         MEDICATIONS  (STANDING):  dexMEDEtomidine Infusion 0.3 MICROgram(s)/kG/Hr (4.08 mL/Hr) IV Continuous <Continuous>  dextrose 5%. 1000 milliLiter(s) (50 mL/Hr) IV Continuous <Continuous>  dextrose 50% Injectable 12.5 Gram(s) IV Push once  dextrose 50% Injectable 25 Gram(s) IV Push once  dextrose 50% Injectable 25 Gram(s) IV Push once  enoxaparin Injectable 40 milliGRAM(s) SubCutaneous daily  FIRST- Mouthwash  BLM 5 milliLiter(s) Swish and Spit four times a day  hydrocortisone 2.5% Rectal Cream 1 Application(s) Rectal two times a day  hydrocortisone hemorrhoidal Suppository 1 Suppository(s) Rectal two times a day  influenza   Vaccine 0.5 milliLiter(s) IntraMuscular once  insulin lispro (HumaLOG) corrective regimen sliding scale   SubCutaneous three times a day before meals  insulin lispro (HumaLOG) corrective regimen sliding scale   SubCutaneous at bedtime  lactobacillus acidophilus 1 Tablet(s) Oral three times a day with meals  lidocaine 2% Gel 1 Application(s) Topical three times a day  mesalamine Suppository 1000 milliGRAM(s) Rectal at bedtime  methylPREDNISolone sodium succinate Injectable 60 milliGRAM(s) IV Push every 12 hours  nystatin    Suspension 132473 Unit(s) Swish and Swallow four times a day  OXcarbazepine 300 milliGRAM(s) Oral two times a day  pantoprazole  Injectable 40 milliGRAM(s) IV Push daily  potassium phosphate / sodium phosphate powder 1 Packet(s) Oral three times a day  propranolol 20 milliGRAM(s) Oral three times a day  sucralfate 1 Gram(s) Oral two times a day  tiotropium 18 MICROgram(s) Capsule 1 Capsule(s) Inhalation daily  witch hazel Pads 1 Application(s) Topical three times a day  zinc oxide 20% Ointment 1 Application(s) Topical two times a day    MEDICATIONS  (PRN):  acetaminophen   Tablet .. 650 milliGRAM(s) Oral every 6 hours PRN Temp greater or equal to 38C (100.4F), Mild Pain (1 - 3)  ALBUTerol    0.083% 2.5 milliGRAM(s) Nebulizer every 6 hours PRN Shortness of Breath and/or Wheezing  dextrose 40% Gel 15 Gram(s) Oral once PRN Blood Glucose LESS THAN 70 milliGRAM(s)/deciliter  glucagon  Injectable 1 milliGRAM(s) IntraMuscular once PRN Glucose LESS THAN 70 milligrams/deciliter  LORazepam     Tablet 2 milliGRAM(s) Oral every 6 hours PRN Anxiety  LORazepam   Injectable 1 milliGRAM(s) IV Push every 6 hours PRN Anxiety  morphine  - Injectable 1 milliGRAM(s) IV Push every 4 hours PRN Severe Pain (7 - 10)  ondansetron    Tablet 4 milliGRAM(s) Oral every 12 hours PRN Nausea  simethicone 80 milliGRAM(s) Chew two times a day PRN Gas         Vitals log        ICU Vital Signs Last 24 Hrs  T(C): 36.7 (17 Jan 2020 03:18), Max: 36.7 (16 Jan 2020 23:53)  T(F): 98 (17 Jan 2020 03:18), Max: 98.1 (16 Jan 2020 23:53)  HR: 70 (17 Jan 2020 06:00) (58 - 104)  BP: 128/73 (17 Jan 2020 06:00) (92/66 - 177/92)  BP(mean): 96 (17 Jan 2020 06:00) (70 - 141)  ABP: --  ABP(mean): --  RR: 25 (17 Jan 2020 06:00) (20 - 39)  SpO2: 95% (17 Jan 2020 06:00) (91% - 100%)           Input and Output:  I&O's Detail    16 Jan 2020 07:01  -  17 Jan 2020 07:00  --------------------------------------------------------  IN:    dexmedetomidine Infusion: 137.8 mL    Oral Fluid: 210 mL  Total IN: 347.8 mL    OUT:    Chest Tube: 120 mL    Voided: 2150 mL  Total OUT: 2270 mL    Total NET: -1922.2 mL          Lab Data                        11.4   10.64 )-----------( 690      ( 17 Jan 2020 05:38 )             36.8     01-17    140  |  96  |  18  ----------------------------<  107<H>  4.2   |  41<H>  |  0.41<L>    Ca    9.0      17 Jan 2020 05:38  Phos  3.0     01-17  Mg     2.1     01-17    TPro  6.0  /  Alb  2.6<L>  /  TBili  0.2  /  DBili  x   /  AST  17  /  ALT  28  /  AlkPhos  81  01-17            Review of Systems	      Objective     Physical Examination    heart s1s2  lung dec BS  abd soft  on high flow NC    Pertinent Lab findings & Imaging      Shauna:  NO   Adequate UO     I&O's Detail    16 Jan 2020 07:01  -  17 Jan 2020 07:00  --------------------------------------------------------  IN:    dexmedetomidine Infusion: 137.8 mL    Oral Fluid: 210 mL  Total IN: 347.8 mL    OUT:    Chest Tube: 120 mL    Voided: 2150 mL  Total OUT: 2270 mL    Total NET: -1922.2 mL               Discussed with:     Cultures:	        Radiology

## 2020-01-17 NOTE — BEHAVIORAL HEALTH ASSESSMENT NOTE - NSBHCHARTREVIEWLAB_PSY_A_CORE FT
11.4   10.64 )-----------( 690      ( 17 Jan 2020 05:38 )             36.8   01-17    140  |  96  |  18  ----------------------------<  107<H>  4.2   |  41<H>  |  0.41<L>    Ca    9.0      17 Jan 2020 05:38  Phos  3.0     01-17  Mg     2.1     01-17    TPro  6.0  /  Alb  2.6<L>  /  TBili  0.2  /  DBili  x   /  AST  17  /  ALT  28  /  AlkPhos  81  01-17

## 2020-01-17 NOTE — BEHAVIORAL HEALTH ASSESSMENT NOTE - RISK ASSESSMENT
Low Acute Suicide Risk Patient is a 58 y/o MWF, with no significant psychiatric history who presents with fear of dying.

## 2020-01-17 NOTE — PROGRESS NOTE ADULT - SUBJECTIVE AND OBJECTIVE BOX
Patient is a 59y old  Female who presents with a chief complaint of pneumothorax, colitis, UTI (17 Jan 2020 10:21)    24 hour events: overnight patient refused ativan and had increase in anxiety, daughter at bedside reports increased difficulty sleeping. Pt seen this am, states breathing is better however is very anxious. Denies any CP, abd pain.    REVIEW OF SYSTEMS  Constitutional: No fever, chills  Neuro: No headache, continued L arm weakness from prior CVA  Resp: No cough, wheezing, improved SOB  CVS: denies cp Denies palpitations  GI: No abdominal pain, nausea, vomiting   : No dysuria, frequency, incontinence  Skin: No itching, burning, rashes, or lesions   Psych: anxious, difficulty sleeping   Heme: No bleeding    T(F): 98.5 (01-17-20 @ 12:15), Max: 98.5 (01-17-20 @ 12:15)  HR: 100 (01-17-20 @ 13:00) (58 - 104)  BP: 151/94 (01-17-20 @ 13:00) (92/66 - 181/98)  RR: 32 (01-17-20 @ 13:00) (21 - 39)  SpO2: 93% (01-17-20 @ 13:00) (91% - 100%)  Wt(kg): --            I&O's Summary    01-16 @ 07:01 - 01-17 @ 07:00  --------------------------------------------------------  IN: 347.8 mL / OUT: 2270 mL / NET: -1922.2 mL    01-17 @ 07:01  -  01-17 @ 13:56  --------------------------------------------------------  IN: 111 mL / OUT: 0 mL / NET: 111 mL      PHYSICAL EXAM  General: frail and cachectic chronically ill appearing female in NAD  CNS: awake, alert, L sided weakness residual  Resp: decreased BS b/l lower bases, minimal coarse BS R side  CVS: regular, no murmur  Abd: soft, NT, ND  Ext: no lower ext edema, no cyanosis  Skin: warm and well perfused  Neuro: anxious, alert, oriented      MEDICATIONS  nystatin    Suspension Swish and Swallow    propranolol Oral    predniSONE   Tablet Oral    ALBUTerol    0.083% Nebulizer PRN  tiotropium 18 MICROgram(s) Capsule Inhalation    acetaminophen   Tablet .. Oral PRN  LORazepam   Injectable IV Push  mirtazapine Soltab Oral  morphine  - Injectable IV Push PRN  ondansetron    Tablet Oral PRN  OXcarbazepine Oral      enoxaparin Injectable SubCutaneous    mesalamine Suppository Rectal  pantoprazole  Injectable IV Push  simethicone Chew PRN  sucralfate Oral      potassium phosphate / sodium phosphate powder Oral    influenza   Vaccine IntraMuscular    FIRST- Mouthwash  BLM Swish and Spit  hydrocortisone 2.5% Rectal Cream Rectal  hydrocortisone hemorrhoidal Suppository Rectal  lidocaine 2% Gel Topical  witch hazel Pads Topical  zinc oxide 20% Ointment Topical    lactobacillus acidophilus Oral                          11.4   10.64 )-----------( 690      ( 17 Jan 2020 05:38 )             36.8       01-17    140  |  96  |  18  ----------------------------<  107<H>  4.2   |  41<H>  |  0.41<L>    Ca    9.0      17 Jan 2020 05:38  Phos  3.0     01-17  Mg     2.1     01-17    TPro  6.0  /  Alb  2.6<L>  /  TBili  0.2  /  DBili  x   /  AST  17  /  ALT  28  /  AlkPhos  81  01-17                      CENTRAL LINE: N  LUKE: N  A-LINE: N    GLOBAL ISSUE/BEST PRACTICE:  Analgesia: N  Sedation:  N  HOB elevation: yes  Stress ulcer prophylaxis: Y  VTE prophylaxis: Y  Glycemic control: N  Nutrition: Y    CODE STATUS: FULL

## 2020-01-17 NOTE — BEHAVIORAL HEALTH ASSESSMENT NOTE - AXIS III
interstitial lung disease hx of pneumothorax (on 2L NC at home), hx pulmonary nodules, Meniere's disease, Non-Hodgkin's lymphoma, Seizure disorder, tachycardia, MVP, hx of chronic SDH

## 2020-01-18 LAB
ANION GAP SERPL CALC-SCNC: 6 MMOL/L — SIGNIFICANT CHANGE UP (ref 5–17)
BUN SERPL-MCNC: 15 MG/DL — SIGNIFICANT CHANGE UP (ref 7–23)
CALCIUM SERPL-MCNC: 9.6 MG/DL — SIGNIFICANT CHANGE UP (ref 8.5–10.1)
CHLORIDE SERPL-SCNC: 93 MMOL/L — LOW (ref 96–108)
CO2 SERPL-SCNC: 38 MMOL/L — HIGH (ref 22–31)
CREAT SERPL-MCNC: 0.47 MG/DL — LOW (ref 0.5–1.3)
GLUCOSE SERPL-MCNC: 121 MG/DL — HIGH (ref 70–99)
MAGNESIUM SERPL-MCNC: 2.3 MG/DL — SIGNIFICANT CHANGE UP (ref 1.6–2.6)
PHOSPHATE SERPL-MCNC: 3.4 MG/DL — SIGNIFICANT CHANGE UP (ref 2.5–4.5)
POTASSIUM SERPL-MCNC: 4.2 MMOL/L — SIGNIFICANT CHANGE UP (ref 3.5–5.3)
POTASSIUM SERPL-SCNC: 4.2 MMOL/L — SIGNIFICANT CHANGE UP (ref 3.5–5.3)
SODIUM SERPL-SCNC: 137 MMOL/L — SIGNIFICANT CHANGE UP (ref 135–145)

## 2020-01-18 PROCEDURE — 76604 US EXAM CHEST: CPT | Mod: 26

## 2020-01-18 PROCEDURE — 99233 SBSQ HOSP IP/OBS HIGH 50: CPT

## 2020-01-18 PROCEDURE — 99232 SBSQ HOSP IP/OBS MODERATE 35: CPT

## 2020-01-18 RX ADMIN — Medication 1 PACKET(S): at 14:03

## 2020-01-18 RX ADMIN — ZINC OXIDE 1 APPLICATION(S): 200 OINTMENT TOPICAL at 05:31

## 2020-01-18 RX ADMIN — Medication 1 APPLICATION(S): at 22:25

## 2020-01-18 RX ADMIN — Medication 1 PACKET(S): at 22:33

## 2020-01-18 RX ADMIN — Medication 1 TABLET(S): at 18:02

## 2020-01-18 RX ADMIN — DIPHENHYDRAMINE HYDROCHLORIDE AND LIDOCAINE HYDROCHLORIDE AND ALUMINUM HYDROXIDE AND MAGNESIUM HYDRO 5 MILLILITER(S): KIT at 18:03

## 2020-01-18 RX ADMIN — Medication 1 TABLET(S): at 11:30

## 2020-01-18 RX ADMIN — ZINC OXIDE 1 APPLICATION(S): 200 OINTMENT TOPICAL at 18:02

## 2020-01-18 RX ADMIN — Medication 1 SUPPOSITORY(S): at 05:30

## 2020-01-18 RX ADMIN — Medication 1000 MILLIGRAM(S): at 22:32

## 2020-01-18 RX ADMIN — Medication 1 GRAM(S): at 18:01

## 2020-01-18 RX ADMIN — Medication 500000 UNIT(S): at 18:01

## 2020-01-18 RX ADMIN — Medication 500000 UNIT(S): at 11:31

## 2020-01-18 RX ADMIN — AER TRAVELER 1 APPLICATION(S): 0.5 SOLUTION RECTAL; TOPICAL at 14:02

## 2020-01-18 RX ADMIN — DIPHENHYDRAMINE HYDROCHLORIDE AND LIDOCAINE HYDROCHLORIDE AND ALUMINUM HYDROXIDE AND MAGNESIUM HYDRO 5 MILLILITER(S): KIT at 23:18

## 2020-01-18 RX ADMIN — Medication 1 GRAM(S): at 05:30

## 2020-01-18 RX ADMIN — MIRTAZAPINE 15 MILLIGRAM(S): 45 TABLET, ORALLY DISINTEGRATING ORAL at 22:33

## 2020-01-18 RX ADMIN — Medication 1 PACKET(S): at 05:30

## 2020-01-18 RX ADMIN — DIPHENHYDRAMINE HYDROCHLORIDE AND LIDOCAINE HYDROCHLORIDE AND ALUMINUM HYDROXIDE AND MAGNESIUM HYDRO 5 MILLILITER(S): KIT at 11:33

## 2020-01-18 RX ADMIN — ENOXAPARIN SODIUM 40 MILLIGRAM(S): 100 INJECTION SUBCUTANEOUS at 11:54

## 2020-01-18 RX ADMIN — OLANZAPINE 2.5 MILLIGRAM(S): 15 TABLET, FILM COATED ORAL at 03:14

## 2020-01-18 RX ADMIN — LIDOCAINE 1 APPLICATION(S): 4 CREAM TOPICAL at 22:26

## 2020-01-18 RX ADMIN — AER TRAVELER 1 APPLICATION(S): 0.5 SOLUTION RECTAL; TOPICAL at 22:25

## 2020-01-18 RX ADMIN — AER TRAVELER 1 APPLICATION(S): 0.5 SOLUTION RECTAL; TOPICAL at 05:31

## 2020-01-18 RX ADMIN — Medication 1 SUPPOSITORY(S): at 18:03

## 2020-01-18 RX ADMIN — LIDOCAINE 1 APPLICATION(S): 4 CREAM TOPICAL at 05:31

## 2020-01-18 RX ADMIN — Medication 1 APPLICATION(S): at 11:32

## 2020-01-18 RX ADMIN — Medication 500000 UNIT(S): at 23:18

## 2020-01-18 RX ADMIN — LIDOCAINE 1 APPLICATION(S): 4 CREAM TOPICAL at 14:02

## 2020-01-18 RX ADMIN — Medication 40 MILLIGRAM(S): at 05:30

## 2020-01-18 RX ADMIN — OXCARBAZEPINE 300 MILLIGRAM(S): 300 TABLET, FILM COATED ORAL at 11:30

## 2020-01-18 RX ADMIN — Medication 25 MILLIGRAM(S): at 05:30

## 2020-01-18 RX ADMIN — TIOTROPIUM BROMIDE 1 CAPSULE(S): 18 CAPSULE ORAL; RESPIRATORY (INHALATION) at 11:35

## 2020-01-18 RX ADMIN — PANTOPRAZOLE SODIUM 40 MILLIGRAM(S): 20 TABLET, DELAYED RELEASE ORAL at 11:31

## 2020-01-18 RX ADMIN — OXCARBAZEPINE 300 MILLIGRAM(S): 300 TABLET, FILM COATED ORAL at 22:12

## 2020-01-18 NOTE — PROGRESS NOTE ADULT - SUBJECTIVE AND OBJECTIVE BOX
Interval events: Agitated and delirious overnight. Pulled out high flow canula with rapid desaturation in 70s, put to max setting and now downtitrated back to previous 40%/30L. Received zyprexa in AM.     Review of Systems: unable to assess in AM due to somnalence    T(F): 97.5 (01-18-20 @ 12:00), Max: 97.7 (01-17-20 @ 16:00)  HR: 112 (01-18-20 @ 12:00) (95 - 135)  BP: 92/64 (01-18-20 @ 12:00) (85/55 - 182/87)  RR: 30 (01-18-20 @ 12:00) (19 - 47)  SpO2: 96% (01-18-20 @ 12:00) (90% - 98%)  Wt(kg): --        CAPILLARY BLOOD GLUCOSE      POCT Blood Glucose.: 131 mg/dL (17 Jan 2020 17:14)    I&O's Summary    17 Jan 2020 07:01  -  18 Jan 2020 07:00  --------------------------------------------------------  IN: 271 mL / OUT: 0 mL / NET: 271 mL    18 Jan 2020 07:01  -  18 Jan 2020 14:36  --------------------------------------------------------  IN: 150 mL / OUT: 0 mL / NET: 150 mL        Physical Exam:   Gen: somnalent but easily arousable to voice  CV: tachy, regular  Pulm: decreased at bases b/l, course breath sounds R > L, unchanged  GI: soft, nt, nd  Ext: no edema, no cyanosis; b/l foot swelling greatly improved  Skin: warm, well perfused      Meds:  enoxaparin Injectable 40 milliGRAM(s) SubCutaneous daily  nystatin    Suspension 265274 Unit(s) Swish and Swallow four times a day  hydrochlorothiazide 25 milliGRAM(s) Oral daily  propranolol 20 milliGRAM(s) Oral three times a day  predniSONE   Tablet 40 milliGRAM(s) Oral daily  ALBUTerol    0.083% 2.5 milliGRAM(s) Nebulizer every 6 hours PRN  tiotropium 18 MICROgram(s) Capsule 1 Capsule(s) Inhalation daily  acetaminophen   Tablet .. 650 milliGRAM(s) Oral every 6 hours PRN  mirtazapine Soltab 15 milliGRAM(s) Oral at bedtime  morphine  - Injectable 1 milliGRAM(s) IV Push every 4 hours PRN  OLANZapine Injectable 2.5 milliGRAM(s) IntraMuscular every 12 hours PRN  ondansetron    Tablet 4 milliGRAM(s) Oral every 12 hours PRN  OXcarbazepine 300 milliGRAM(s) Oral two times a day  mesalamine Suppository 1000 milliGRAM(s) Rectal at bedtime  pantoprazole  Injectable 40 milliGRAM(s) IV Push daily  simethicone 80 milliGRAM(s) Chew two times a day PRN  sucralfate 1 Gram(s) Oral two times a day  potassium phosphate / sodium phosphate powder 1 Packet(s) Oral three times a day  influenza   Vaccine 0.5 milliLiter(s) IntraMuscular once  FIRST- Mouthwash  BLM 5 milliLiter(s) Swish and Spit four times a day  hydrocortisone 2.5% Rectal Cream 1 Application(s) Rectal two times a day  hydrocortisone hemorrhoidal Suppository 1 Suppository(s) Rectal two times a day  lidocaine 2% Gel 1 Application(s) Topical three times a day  witch hazel Pads 1 Application(s) Topical three times a day  zinc oxide 20% Ointment 1 Application(s) Topical two times a day  lactobacillus acidophilus 1 Tablet(s) Oral three times a day with meals                                13.2   24.28 )-----------( 695      ( 18 Jan 2020 08:01 )             42.1       01-18    137  |  93<L>  |  15  ----------------------------<  121<H>  4.2   |  38<H>  |  0.47<L>    Ca    9.6      18 Jan 2020 04:59  Phos  3.4     01-18  Mg     2.3     01-18    TPro  6.0  /  Alb  2.6<L>  /  TBili  0.2  /  DBili  x   /  AST  17  /  ALT  28  /  AlkPhos  81  01-17                        Radiology: unchange R apical PTX on CXR, pigtail removed  Bedside Lung U/S: irregular pleural surface with scant b-lines, trace L pleural effusion, no lung side R apex  Bedside Cardiac U/S: grossly normal LV size and function, no RV dilation    CENTRAL LINE: N     LUKE: N       A-LINE: N       GLOBAL ISSUE/BEST PRACTICE:  Analgesia: n/a  Sedation: zyprexa, remeron  CAM-ICU: positive  HOB elevation: yes  Stress ulcer prophylaxis: n/a  VTE prophylaxis: lovenox  Glycemic control: n/a  Nutrition: regular diet    CODE STATUS: full code

## 2020-01-18 NOTE — PROGRESS NOTE ADULT - ASSESSMENT
59F h/o ILD (unknown etiology, no Bx), chronic hypoxemic respiratory failure, known chronic R PTX which has been conservatively managed, remote NHL (s/p XRT, chemo and SCT), previous CVA with R sided weakness, seizure do on trileptal, chronic SDH v hygromas admitted 12/26 with colitis/proctitis and treated with CTX and flagyl.  Hospital course complicated by development of HCAP, acute on chronic hypoxemic respiratory failure requiring high flow NC and agitation/delirium    Neuro: delirium likely multifactorial 2/2 steroids, ICU delirium - mental status waxes and wanes; somnalence this AM likely from zypreza though if no improvement later in the day will get ABG to assess for CO2 retention as confounding cause; continue trileptal   CV: hydrochlorothiazide added for BP control, continue home propanolol - tachyacrdia this AM likely due to missed proponalol dose 2/2 mental status  Pulm: hypoxemix episode yeserday when removed high flow canula - low suspicion for new infection, no evidence of pulm edema on US, R apical pneumo remains unchanged despite removal of chest tube 2 days ago - hypoxemic respiratory failure at this time likely 2/2 underlying ILD and acute insult from recent PNA - due to rapid desat while lying down when O2 removed by pt, low likelihood she would do well on nasal canula at this time - pt s/p pulsed steroids for ILD flare with improvement initially, but now unable to further wean FiO2; continue prednisone 40mg daily; continue duonebs, mucomyst, hypertonic for secretions clearance; continue to wean as tolerates    GI: tolerating PO diet though will not feed while somnalent; continue mesalamine and witch hazel for colitits  Renal: no acute issues  ID: zosyn course completed 1/14; continue FRST mouthwash for oral thrush for 7 day course through 1/21  Dispo: pt to remain in ICU at this time due to high oxygen requirements  GOC: extensive conversation with pt's daughter and  at bedside today regarding pt's overall prognosis, at this time they are uncertain as to what pt would want if unable to wean further from high flow of if resp status further decompensated to requiring intubation. At current, pt remains FULL CODE 59F h/o ILD (unknown etiology, no Bx), chronic hypoxemic respiratory failure, known chronic R PTX which has been conservatively managed, remote NHL (s/p XRT, chemo and SCT), previous CVA with R sided weakness, seizure do on trileptal, chronic SDH v hygromas admitted 12/26 with colitis/proctitis and treated with CTX and flagyl.  Hospital course complicated by development of HCAP, acute on chronic hypoxemic respiratory failure requiring high flow NC and agitation/delirium    Neuro: delirium likely multifactorial 2/2 steroids, ICU delirium though family has noted similar delirium at home as well in recent months - mental status waxes and wanes; somnalence this AM likely from zypreza though if no improvement later in the day will get ABG to assess for CO2 retention as confounding cause; continue trileptal   CV: hydrochlorothiazide added for BP control, continue home propanolol - tachyacrdia this AM likely due to missed proponalol dose 2/2 mental status  Pulm: hypoxemix episode yeserday when removed high flow canula - low suspicion for new infection, no evidence of pulm edema on US, R apical pneumo remains unchanged despite removal of chest tube 2 days ago - hypoxemic respiratory failure at this time likely 2/2 underlying ILD and acute insult from recent PNA - due to rapid desat while lying down when O2 removed by pt, low likelihood she would do well on nasal canula at this time - pt s/p pulsed steroids for ILD flare with improvement initially, but now unable to further wean FiO2; continue prednisone 40mg daily; continue duonebs, mucomyst, hypertonic for secretions clearance; continue to wean as tolerates    GI: tolerating PO diet though will not feed while somnalent; continue mesalamine and witch hazel for colitits  Renal: no acute issues  ID: zosyn course completed 1/14; continue FRST mouthwash for oral thrush for 7 day course through 1/21  Dispo: pt to remain in ICU at this time due to high oxygen requirements  GOC: extensive conversation with pt's daughter and  at bedside today regarding pt's overall prognosis, at this time they are uncertain as to what pt would want if unable to wean further from high flow of if resp status further decompensated to requiring intubation. At current, pt remains FULL CODE

## 2020-01-18 NOTE — PROGRESS NOTE ADULT - SUBJECTIVE AND OBJECTIVE BOX
CHIEF COMPLAINT/INTERVAL HISTORY:  Pt. seen and evaluated for acute hypoxic respiratory failure.  Pt. is somnolent.  Pt. is arousable but quickly returns to sleep.  Did report that her breathing is better.  Tolerating Prednisone.     REVIEW OF SYSTEMS:  No fever, CP, or abdominal pain.      Vital Signs Last 24 Hrs  T(C): 36.4 (18 Jan 2020 07:00), Max: 36.9 (17 Jan 2020 12:15)  T(F): 97.5 (18 Jan 2020 07:00), Max: 98.5 (17 Jan 2020 12:15)  HR: 104 (18 Jan 2020 08:00) (92 - 135)  BP: 140/80 (18 Jan 2020 08:00) (102/62 - 182/87)  BP(mean): 77 (18 Jan 2020 07:00) (77 - 138)  RR: 35 (18 Jan 2020 08:00) (23 - 47)  SpO2: 95% (18 Jan 2020 08:00) (90% - 98%)    PHYSICAL EXAM:  GENERAL: somnolent  HEENT: hearing normal, conjunctiva and sclera clear, +HFNC  Chest: Diminished BS at bases, no wheezing  CV: S1S2, RRR,   GI: soft, +BS, NT/ND  Musculoskeletal: no edema, contracture of left wrist  Psychiatric: somnolent  Skin: warm and dry    LABS:                        13.2   24.28 )-----------( 695      ( 18 Jan 2020 08:01 )             42.1     01-18    137  |  93<L>  |  15  ----------------------------<  121<H>  4.2   |  38<H>  |  0.47<L>    Ca    9.6      18 Jan 2020 04:59  Phos  3.4     01-18  Mg     2.3     01-18    TPro  6.0  /  Alb  2.6<L>  /  TBili  0.2  /  DBili  x   /  AST  17  /  ALT  28  /  AlkPhos  81  01-17      Assessment and Plan:  -Septic shock and acute hypoxic respiratory failure 2/2 hospital acquired pneumonia, right pneumothorax, and ILD:  Shock has resolved.  Off vasopressors.  Completed course of IV antibiotics.  s/p removal of chest tube on 1/16.  Continue Prednisone 40mg PO daily, Spiriva inh daily, Albuterol Neb Q6h PRN, and O2 support.  ID, Pulmonary, and Intensivist f/u  -Colitis:  Completed course of IV antibiotics.  Continue mesalamine 1000mg CA QHS.  GI f/u  -Nausea and GERD:  Zofran 4mg PO Q12h PRN, Simethicone 80mg PO BID PRN, Carafate 1gm PO BID, and Protonix 40mg IV daily.   -Seizure disorder:  continue Trileptal 300mg PO BID.  Neurology f/u  -Thrush:  continue nystatin swish and swallow four times a day  -VTE ppx: Lovenox 40mg SQ daily

## 2020-01-18 NOTE — PROGRESS NOTE ADULT - SUBJECTIVE AND OBJECTIVE BOX
INTERVAL HPI/OVERNIGHT EVENTS:    MEDICATIONS  (STANDING):  enoxaparin Injectable 40 milliGRAM(s) SubCutaneous daily  FIRST- Mouthwash  BLM 5 milliLiter(s) Swish and Spit four times a day  hydrochlorothiazide 25 milliGRAM(s) Oral daily  hydrocortisone 2.5% Rectal Cream 1 Application(s) Rectal two times a day  hydrocortisone hemorrhoidal Suppository 1 Suppository(s) Rectal two times a day  influenza   Vaccine 0.5 milliLiter(s) IntraMuscular once  lactobacillus acidophilus 1 Tablet(s) Oral three times a day with meals  lidocaine 2% Gel 1 Application(s) Topical three times a day  mesalamine Suppository 1000 milliGRAM(s) Rectal at bedtime  mirtazapine Soltab 15 milliGRAM(s) Oral at bedtime  nystatin    Suspension 826884 Unit(s) Swish and Swallow four times a day  OXcarbazepine 300 milliGRAM(s) Oral two times a day  pantoprazole  Injectable 40 milliGRAM(s) IV Push daily  potassium phosphate / sodium phosphate powder 1 Packet(s) Oral three times a day  predniSONE   Tablet 40 milliGRAM(s) Oral daily  propranolol 20 milliGRAM(s) Oral three times a day  sucralfate 1 Gram(s) Oral two times a day  tiotropium 18 MICROgram(s) Capsule 1 Capsule(s) Inhalation daily  witch hazel Pads 1 Application(s) Topical three times a day  zinc oxide 20% Ointment 1 Application(s) Topical two times a day    MEDICATIONS  (PRN):  acetaminophen   Tablet .. 650 milliGRAM(s) Oral every 6 hours PRN Temp greater or equal to 38C (100.4F), Mild Pain (1 - 3)  ALBUTerol    0.083% 2.5 milliGRAM(s) Nebulizer every 6 hours PRN Shortness of Breath and/or Wheezing  morphine  - Injectable 1 milliGRAM(s) IV Push every 4 hours PRN Severe Pain (7 - 10)  OLANZapine Injectable 2.5 milliGRAM(s) IntraMuscular every 12 hours PRN agitation or anxiety  ondansetron    Tablet 4 milliGRAM(s) Oral every 12 hours PRN Nausea  simethicone 80 milliGRAM(s) Chew two times a day PRN Gas      Allergies    IV Contrast (Anaphylaxis)  shellfish. (Anaphylaxis)    Intolerances        Review of Systems:    General:  No wt loss, fevers, chills, night sweats,fatigue,   Eyes:  Good vision, no reported pain  ENT:  No sore throat, pain, runny nose, dysphagia  CV:  No pain, palpitatioins, hypo/hypertension  Resp:  No dyspnea, cough, tachypnea, wheezing  GI:  No pain, No nausea, No vomiting, No diarrhea, No constipatiion, No weight loss, No fever, No pruritis, No rectal bleeding, No tarry stools, No dysphagia,  :  No pain, bleeding, incontinence, nocturia  Muscle:  No pain, weakness  Neuro:  No weakness, tingling, memory problems  Psych:  No fatigue, insomnia, mood problems, depression  Endocrine:  No polyuria, polydypsia, cold/heat intolerance  Heme:  No petechiae, ecchymosis, easy bruisability  Skin:  No rash, tattoos, scars, edema      Vital Signs Last 24 Hrs  T(C): 36.7 (18 Jan 2020 20:14), Max: 36.7 (18 Jan 2020 15:07)  T(F): 98 (18 Jan 2020 20:14), Max: 98 (18 Jan 2020 15:07)  HR: 114 (18 Jan 2020 20:00) (94 - 135)  BP: 106/66 (18 Jan 2020 20:00) (85/55 - 168/103)  BP(mean): 82 (18 Jan 2020 20:00) (66 - 136)  RR: 38 (18 Jan 2020 20:00) (19 - 47)  SpO2: 96% (18 Jan 2020 20:00) (90% - 98%)    PHYSICAL EXAM:    Constitutional: NAD, well-developed  HEENT: EOMI, throat clear  Neck: No LAD, supple  Respiratory: CTA and P  Cardiovascular: S1 and S2, RRR, no M  Gastrointestinal: BS+, soft, NT/ND, neg HSM,  Extremities: No peripheral edema, neg clubing, cyanosis  Vascular: 2+ peripheral pulses  Neurological: A/O x 3, no focal deficits  Psychiatric: Normal mood, normal affect  Skin: No rashes      LABS:                        13.2   24.28 )-----------( 695      ( 18 Jan 2020 08:01 )             42.1     01-18    137  |  93<L>  |  15  ----------------------------<  121<H>  4.2   |  38<H>  |  0.47<L>    Ca    9.6      18 Jan 2020 04:59  Phos  3.4     01-18  Mg     2.3     01-18    TPro  6.0  /  Alb  2.6<L>  /  TBili  0.2  /  DBili  x   /  AST  17  /  ALT  28  /  AlkPhos  81  01-17          RADIOLOGY & ADDITIONAL TESTS:

## 2020-01-18 NOTE — PROGRESS NOTE ADULT - ASSESSMENT
ASSESSMENT:    59F initially admitted with colitis, now with prolonged stay in ICU complicated by prolonged hypoxemic resp. failure requiring HFNC, chronic right apical PTX S/P CT (now removed).    Events last 24 hours:   -prolonged course on HFNC, with poor PO intake, course further complicated by periods of severe anxiety.    PLAN:     #ILD w/ persistent hypoxemic resp. failure Requiring HFNC  -rapidly desats when patient removed HFNC ealier today, unable to wean FIO2  -will continue to wean FIO2 and flow rate as tolerated for goal SaO2 >90%  -was pulse dosed with steroids today  -anbx course completeed  -will need persistent and aggressive goals of care conversations  -if patient is unable to wean from HFNC in near future, and family contineus with FUll code status, patient may need intubation, with progression to trach/PEG eval to allow for ventilation/oxygenation, and administration of nutrition    #Anxiety  -currently on regimen of mirtazapine, zyprexa, and trileptal  -PRN morphine for dyspnea  -  #Colitis  -completed course of flagyl  -currently on mesalamine and witch hazel

## 2020-01-18 NOTE — PROGRESS NOTE ADULT - SUBJECTIVE AND OBJECTIVE BOX
Patient is a 59y old  Female who presents with a chief complaint of pneumothorax, colitis, UTI (18 Jan 2020 19:39)      BRIEF HOSPITAL COURSE:     59F initially admitted with colitis, now with prolonged stay in ICU complicated by prolonged hypoxemic resp. failure requiring HFNC, chronic right apical PTX S/P CT (now removed).    Events last 24 hours:   -prolonged course on HFNC, with poor PO intake, course further complicated by periods of severe anxiety.    PAST MEDICAL & SURGICAL HISTORY:  Interstitial lung disease: on home o2 prn  NHL (non-Hodgkin's lymphoma): Agem 43 sp chemo/rt/stem cell  Transient cerebral ischemia, unspecified type  Mitral prolapse  History of tonsillectomy  History of appendectomy      Review of Systems:  CONSTITUTIONAL: No fever, chills, or fatigue  EYES: No eye pain, visual disturbances, or discharge  ENMT:  No difficulty hearing, tinnitus, vertigo; No sinus or throat pain  NECK: No pain or stiffness  RESPIRATORY: No cough, wheezing, chills or hemoptysis; (+) Shortness of breath  CARDIOVASCULAR: No chest pain, palpitations, dizziness, or leg swelling  GASTROINTESTINAL: No abdominal or epigastric pain. No nausea, vomiting, or hematemesis; No diarrhea or constipation. No melena or hematochezia.  GENITOURINARY: No dysuria, frequency, hematuria, or incontinence  NEUROLOGICAL: No headaches, memory loss, loss of strength, numbness, or tremors. (+) anxiety        Medications:  nystatin    Suspension 729009 Unit(s) Swish and Swallow four times a day    hydrochlorothiazide 25 milliGRAM(s) Oral daily  propranolol 20 milliGRAM(s) Oral three times a day    ALBUTerol    0.083% 2.5 milliGRAM(s) Nebulizer every 6 hours PRN  tiotropium 18 MICROgram(s) Capsule 1 Capsule(s) Inhalation daily    acetaminophen   Tablet .. 650 milliGRAM(s) Oral every 6 hours PRN  mirtazapine Soltab 15 milliGRAM(s) Oral at bedtime  morphine  - Injectable 1 milliGRAM(s) IV Push every 4 hours PRN  OLANZapine Injectable 2.5 milliGRAM(s) IntraMuscular every 12 hours PRN  ondansetron    Tablet 4 milliGRAM(s) Oral every 12 hours PRN  OXcarbazepine 300 milliGRAM(s) Oral two times a day      enoxaparin Injectable 40 milliGRAM(s) SubCutaneous daily    mesalamine Suppository 1000 milliGRAM(s) Rectal at bedtime  pantoprazole  Injectable 40 milliGRAM(s) IV Push daily  simethicone 80 milliGRAM(s) Chew two times a day PRN  sucralfate 1 Gram(s) Oral two times a day      predniSONE   Tablet 40 milliGRAM(s) Oral daily    potassium phosphate / sodium phosphate powder 1 Packet(s) Oral three times a day    influenza   Vaccine 0.5 milliLiter(s) IntraMuscular once    FIRST- Mouthwash  BLM 5 milliLiter(s) Swish and Spit four times a day  hydrocortisone 2.5% Rectal Cream 1 Application(s) Rectal two times a day  hydrocortisone hemorrhoidal Suppository 1 Suppository(s) Rectal two times a day  lidocaine 2% Gel 1 Application(s) Topical three times a day  witch hazel Pads 1 Application(s) Topical three times a day  zinc oxide 20% Ointment 1 Application(s) Topical two times a day    lactobacillus acidophilus 1 Tablet(s) Oral three times a day with meals          ICU Vital Signs Last 24 Hrs  T(C): 36.7 (18 Jan 2020 20:14), Max: 36.7 (18 Jan 2020 15:07)  T(F): 98 (18 Jan 2020 20:14), Max: 98 (18 Jan 2020 15:07)  HR: 114 (18 Jan 2020 20:00) (94 - 135)  BP: 106/66 (18 Jan 2020 20:00) (85/55 - 168/103)  BP(mean): 82 (18 Jan 2020 20:00) (66 - 136)  RR: 38 (18 Jan 2020 20:00) (19 - 47)  SpO2: 96% (18 Jan 2020 20:00) (90% - 98%)          I&O's Detail    17 Jan 2020 07:01  -  18 Jan 2020 07:00  --------------------------------------------------------  IN:    dexmedetomidine Infusion: 11 mL    Oral Fluid: 260 mL  Total IN: 271 mL    OUT:  Total OUT: 0 mL    Total NET: 271 mL      18 Jan 2020 07:01  -  18 Jan 2020 21:12  --------------------------------------------------------  IN:    Oral Fluid: 230 mL  Total IN: 230 mL    OUT:  Total OUT: 0 mL    Total NET: 230 mL            LABS:                        13.2   24.28 )-----------( 695      ( 18 Jan 2020 08:01 )             42.1     01-18    137  |  93<L>  |  15  ----------------------------<  121<H>  4.2   |  38<H>  |  0.47<L>    Ca    9.6      18 Jan 2020 04:59  Phos  3.4     01-18  Mg     2.3     01-18    TPro  6.0  /  Alb  2.6<L>  /  TBili  0.2  /  DBili  x   /  AST  17  /  ALT  28  /  AlkPhos  81  01-17          CAPILLARY BLOOD GLUCOSE      POCT Blood Glucose.: 131 mg/dL (17 Jan 2020 17:14)        CULTURES:      Physical Examination:    General: No acute distress.  Alert, oriented, interactive, nonfocal. Appears anxious    HEENT: Pupils equal, reactive to light.  Symmetric.    PULM: diminished at bases bilaterally, no significant sputum production    CVS: Regular rate and rhythm, no murmurs, rubs, or gallops    ABD: Soft, nondistended, nontender, normoactive bowel sounds, no masses    EXT: No edema, nontender    SKIN: Warm and well perfused, no rashes noted.

## 2020-01-18 NOTE — PROGRESS NOTE ADULT - PROBLEM SELECTOR PLAN 1
remains on high flow NC  completed ABX for poss LRTI  psych eval noted  oral and skin care  assist with ADL  remains on PO Prednisone  supportive ICU care  prognosis remains very poor  s/p Pigtail for right PTX -   will follow and monitor  GOC discussion ongoing  no improvement noted in resp status  ILD - Chronic Lung Disease - advanced and ? etiol - discussed with outpatient Pulm MD - pt refused work up in the past -

## 2020-01-18 NOTE — PROGRESS NOTE ADULT - SUBJECTIVE AND OBJECTIVE BOX
Date/Time Patient Seen:  		  Referring MD:   Data Reviewed	       Patient is a 59y old  Female who presents with a chief complaint of pneumothorax, colitis, UTI (17 Jan 2020 13:55)      Subjective/HPI     PAST MEDICAL & SURGICAL HISTORY:  Interstitial lung disease: on home o2 prn  NHL (non-Hodgkin's lymphoma): Agem 43 sp chemo/rt/stem cell  Transient cerebral ischemia, unspecified type  Mitral prolapse  Pulmonary disease  History of tonsillectomy  History of appendectomy        Medication list         MEDICATIONS  (STANDING):  enoxaparin Injectable 40 milliGRAM(s) SubCutaneous daily  FIRST- Mouthwash  BLM 5 milliLiter(s) Swish and Spit four times a day  hydrochlorothiazide 25 milliGRAM(s) Oral daily  hydrocortisone 2.5% Rectal Cream 1 Application(s) Rectal two times a day  hydrocortisone hemorrhoidal Suppository 1 Suppository(s) Rectal two times a day  influenza   Vaccine 0.5 milliLiter(s) IntraMuscular once  lactobacillus acidophilus 1 Tablet(s) Oral three times a day with meals  lidocaine 2% Gel 1 Application(s) Topical three times a day  mesalamine Suppository 1000 milliGRAM(s) Rectal at bedtime  mirtazapine Soltab 15 milliGRAM(s) Oral at bedtime  nystatin    Suspension [id number] Unit(s) Swish and Swallow four times a day  OXcarbazepine 300 milliGRAM(s) Oral two times a day  pantoprazole  Injectable 40 milliGRAM(s) IV Push daily  potassium phosphate / sodium phosphate powder 1 Packet(s) Oral three times a day  predniSONE   Tablet 40 milliGRAM(s) Oral daily  propranolol 20 milliGRAM(s) Oral three times a day  sucralfate 1 Gram(s) Oral two times a day  tiotropium 18 MICROgram(s) Capsule 1 Capsule(s) Inhalation daily  witch hazel Pads 1 Application(s) Topical three times a day  zinc oxide 20% Ointment 1 Application(s) Topical two times a day    MEDICATIONS  (PRN):  acetaminophen   Tablet .. 650 milliGRAM(s) Oral every 6 hours PRN Temp greater or equal to 38C (100.4F), Mild Pain (1 - 3)  ALBUTerol    0.083% 2.5 milliGRAM(s) Nebulizer every 6 hours PRN Shortness of Breath and/or Wheezing  morphine  - Injectable 1 milliGRAM(s) IV Push every 4 hours PRN Severe Pain (7 - 10)  OLANZapine Injectable 2.5 milliGRAM(s) IntraMuscular every 12 hours PRN agitation or anxiety  ondansetron    Tablet 4 milliGRAM(s) Oral every 12 hours PRN Nausea  simethicone 80 milliGRAM(s) Chew two times a day PRN Gas         Vitals log        ICU Vital Signs Last 24 Hrs  T(C): 36.4 (18 Jan 2020 05:37), Max: 36.9 (17 Jan 2020 12:15)  T(F): 97.5 (18 Jan 2020 05:37), Max: 98.5 (17 Jan 2020 12:15)  HR: 126 (18 Jan 2020 06:00) (74 - 135)  BP: 128/85 (18 Jan 2020 06:00) (126/81 - 182/87)  BP(mean): 102 (18 Jan 2020 06:00) (98 - 138)  ABP: --  ABP(mean): --  RR: 31 (18 Jan 2020 06:00) (23 - 47)  SpO2: 92% (18 Jan 2020 06:00) (90% - 98%)           Input and Output:  I&O's Detail    17 Jan 2020 07:01  -  18 Jan 2020 07:00  --------------------------------------------------------  IN:    dexmedetomidine Infusion: 11 mL    Oral Fluid: 260 mL  Total IN: 271 mL    OUT:  Total OUT: 0 mL    Total NET: 271 mL          Lab Data                        11.4   10.64 )-----------( 690      ( 17 Jan 2020 05:38 )             36.8     01-18    137  |  93<L>  |  15  ----------------------------<  121<H>  4.2   |  38<H>  |  0.47<L>    Ca    9.6      18 Jan 2020 04:59  Phos  3.4     01-18  Mg     2.3     01-18    TPro  6.0  /  Alb  2.6<L>  /  TBili  0.2  /  DBili  x   /  AST  17  /  ALT  28  /  AlkPhos  81  01-17            Review of Systems	      Objective     Physical Examination    heart 1s2  lung dec BS  abd soft  head nc  on high flow NC      Pertinent Lab findings & Imaging      Shauna:  NO   Adequate UO     I&O's Detail    17 Jan 2020 07:01  -  18 Jan 2020 07:00  --------------------------------------------------------  IN:    dexmedetomidine Infusion: 11 mL    Oral Fluid: 260 mL  Total IN: 271 mL    OUT:  Total OUT: 0 mL    Total NET: 271 mL               Discussed with:     Cultures:	        Radiology

## 2020-01-18 NOTE — CHART NOTE - NSCHARTNOTEFT_GEN_A_CORE
Assessment: Pt on hi debby O2, tolerating only small amounts of PO diet. Per  only took juice yesterday due to anxiety and subsequent medication; drousy this am, no breakfast taken yet. Seen by SLP, on puree/honey thick liquids. Pt refuses po supplements. Offerred  foods like magic cup ice cream-will ask pt later. On Rx for thrush with steroids use. BM 1/16.    Factors impacting intake: [ ] none [ ] nausea  [ ] vomiting [ ] diarrhea [ ] constipation  [ ]chewing problems [ ] swallowing issues  [ ] other:     Diet Presciption: Diet, Dysphagia 1 Pureed-Nectar Consistency Fluid (01-16-20 @ 10:06)    Intake: poor    Current Weight:   % Weight Change    Pertinent Medications: MEDICATIONS  (STANDING):  enoxaparin Injectable 40 milliGRAM(s) SubCutaneous daily  FIRST- Mouthwash  BLM 5 milliLiter(s) Swish and Spit four times a day  hydrochlorothiazide 25 milliGRAM(s) Oral daily  hydrocortisone 2.5% Rectal Cream 1 Application(s) Rectal two times a day  hydrocortisone hemorrhoidal Suppository 1 Suppository(s) Rectal two times a day  influenza   Vaccine 0.5 milliLiter(s) IntraMuscular once  lactobacillus acidophilus 1 Tablet(s) Oral three times a day with meals  lidocaine 2% Gel 1 Application(s) Topical three times a day  mesalamine Suppository 1000 milliGRAM(s) Rectal at bedtime  mirtazapine Soltab 15 milliGRAM(s) Oral at bedtime  nystatin    Suspension 155192 Unit(s) Swish and Swallow four times a day  OXcarbazepine 300 milliGRAM(s) Oral two times a day  pantoprazole  Injectable 40 milliGRAM(s) IV Push daily  potassium phosphate / sodium phosphate powder 1 Packet(s) Oral three times a day  predniSONE   Tablet 40 milliGRAM(s) Oral daily  propranolol 20 milliGRAM(s) Oral three times a day  sucralfate 1 Gram(s) Oral two times a day  tiotropium 18 MICROgram(s) Capsule 1 Capsule(s) Inhalation daily  witch hazel Pads 1 Application(s) Topical three times a day  zinc oxide 20% Ointment 1 Application(s) Topical two times a day    MEDICATIONS  (PRN):  acetaminophen   Tablet .. 650 milliGRAM(s) Oral every 6 hours PRN Temp greater or equal to 38C (100.4F), Mild Pain (1 - 3)  ALBUTerol    0.083% 2.5 milliGRAM(s) Nebulizer every 6 hours PRN Shortness of Breath and/or Wheezing  morphine  - Injectable 1 milliGRAM(s) IV Push every 4 hours PRN Severe Pain (7 - 10)  OLANZapine Injectable 2.5 milliGRAM(s) IntraMuscular every 12 hours PRN agitation or anxiety  ondansetron    Tablet 4 milliGRAM(s) Oral every 12 hours PRN Nausea  simethicone 80 milliGRAM(s) Chew two times a day PRN Gas    Pertinent Labs: 01-18 Na137 mmol/L Glu 121 mg/dL<H> K+ 4.2 mmol/L Cr  0.47 mg/dL<L> BUN 15 mg/dL 01-18 Phos 3.4 mg/dL 01-17 Alb 2.6 g/dL<L>     CAPILLARY BLOOD GLUCOSE      POCT Blood Glucose.: 131 mg/dL (17 Jan 2020 17:14)  POCT Blood Glucose.: 122 mg/dL (17 Jan 2020 12:08)    Skin: pressure injuries noted    Estimated Needs:   [x ] no change since previous assessment  [ ] recalculated:     Previous Nutrition Diagnosis:   [ ] Inadequate Energy Intake [ ]Inadequate Oral Intake [ ] Excessive Energy Intake   [ ] Underweight [ ] Increased Nutrient Needs [ ] Overweight/Obesity   [ ] Altered GI Function [ ] Unintended Weight Loss [ ] Food & Nutrition Related Knowledge Deficit [x ] Malnutrition     Nutrition Diagnosis is [x ] ongoing  [ ] resolved [ ] not applicable     New Nutrition Diagnosis: [ ] not applicable       Interventions: Rec add mvi, vit c. Suggest palliative care eval to discuss GOC, ie tube feeding if po intake remains poor.  Recommend  [ ] Change Diet To:  [ ] Nutrition Supplement  [ ] Nutrition Support  [ ] Other:     Monitoring and Evaluation:   [ ] PO intake [ x ] Tolerance to diet prescription [ x ] weights [ x ] labs[ x ] follow up per protocol  [ ] other:

## 2020-01-19 LAB
ANION GAP SERPL CALC-SCNC: 5 MMOL/L — SIGNIFICANT CHANGE UP (ref 5–17)
BASE EXCESS BLDA CALC-SCNC: 22.2 MMOL/L — HIGH (ref -2–2)
BLOOD GAS COMMENTS ARTERIAL: SIGNIFICANT CHANGE UP
BUN SERPL-MCNC: 17 MG/DL — SIGNIFICANT CHANGE UP (ref 7–23)
CALCIUM SERPL-MCNC: 9.2 MG/DL — SIGNIFICANT CHANGE UP (ref 8.5–10.1)
CHLORIDE SERPL-SCNC: 90 MMOL/L — LOW (ref 96–108)
CO2 SERPL-SCNC: 38 MMOL/L — HIGH (ref 22–31)
CREAT SERPL-MCNC: 0.49 MG/DL — LOW (ref 0.5–1.3)
GLUCOSE SERPL-MCNC: 117 MG/DL — HIGH (ref 70–99)
HCO3 BLDA-SCNC: 44 MMOL/L — HIGH (ref 23–27)
HCT VFR BLD CALC: 42.4 % — SIGNIFICANT CHANGE UP (ref 34.5–45)
HGB BLD-MCNC: 13.1 G/DL — SIGNIFICANT CHANGE UP (ref 11.5–15.5)
HOROWITZ INDEX BLDA+IHG-RTO: 45 — SIGNIFICANT CHANGE UP
MAGNESIUM SERPL-MCNC: 2.1 MG/DL — SIGNIFICANT CHANGE UP (ref 1.6–2.6)
MCHC RBC-ENTMCNC: 28.3 PG — SIGNIFICANT CHANGE UP (ref 27–34)
MCHC RBC-ENTMCNC: 30.9 GM/DL — LOW (ref 32–36)
MCV RBC AUTO: 91.6 FL — SIGNIFICANT CHANGE UP (ref 80–100)
NRBC # BLD: 0 /100 WBCS — SIGNIFICANT CHANGE UP (ref 0–0)
PCO2 BLDA: 99 MMHG — CRITICAL HIGH (ref 32–46)
PH BLDA: 7.32 — LOW (ref 7.35–7.45)
PHOSPHATE SERPL-MCNC: 3 MG/DL — SIGNIFICANT CHANGE UP (ref 2.5–4.5)
PLATELET # BLD AUTO: 506 K/UL — HIGH (ref 150–400)
PO2 BLDA: 132 MMHG — HIGH (ref 74–108)
POTASSIUM SERPL-MCNC: 4.4 MMOL/L — SIGNIFICANT CHANGE UP (ref 3.5–5.3)
POTASSIUM SERPL-SCNC: 4.4 MMOL/L — SIGNIFICANT CHANGE UP (ref 3.5–5.3)
RBC # BLD: 4.63 M/UL — SIGNIFICANT CHANGE UP (ref 3.8–5.2)
RBC # FLD: 14.7 % — HIGH (ref 10.3–14.5)
SAO2 % BLDA: 98 % — HIGH (ref 92–96)
SODIUM SERPL-SCNC: 133 MMOL/L — LOW (ref 135–145)
WBC # BLD: 15.29 K/UL — HIGH (ref 3.8–10.5)
WBC # FLD AUTO: 15.29 K/UL — HIGH (ref 3.8–10.5)

## 2020-01-19 PROCEDURE — 99232 SBSQ HOSP IP/OBS MODERATE 35: CPT

## 2020-01-19 PROCEDURE — 99233 SBSQ HOSP IP/OBS HIGH 50: CPT

## 2020-01-19 PROCEDURE — 76604 US EXAM CHEST: CPT | Mod: 26

## 2020-01-19 RX ADMIN — DIPHENHYDRAMINE HYDROCHLORIDE AND LIDOCAINE HYDROCHLORIDE AND ALUMINUM HYDROXIDE AND MAGNESIUM HYDRO 5 MILLILITER(S): KIT at 12:14

## 2020-01-19 RX ADMIN — Medication 1 TABLET(S): at 12:13

## 2020-01-19 RX ADMIN — Medication 1 SUPPOSITORY(S): at 17:35

## 2020-01-19 RX ADMIN — Medication 1 TABLET(S): at 08:17

## 2020-01-19 RX ADMIN — OXCARBAZEPINE 300 MILLIGRAM(S): 300 TABLET, FILM COATED ORAL at 09:20

## 2020-01-19 RX ADMIN — ZINC OXIDE 1 APPLICATION(S): 200 OINTMENT TOPICAL at 06:19

## 2020-01-19 RX ADMIN — Medication 1 PACKET(S): at 13:55

## 2020-01-19 RX ADMIN — Medication 1 GRAM(S): at 06:19

## 2020-01-19 RX ADMIN — Medication 500000 UNIT(S): at 12:13

## 2020-01-19 RX ADMIN — Medication 1 PACKET(S): at 07:31

## 2020-01-19 RX ADMIN — AER TRAVELER 1 APPLICATION(S): 0.5 SOLUTION RECTAL; TOPICAL at 21:32

## 2020-01-19 RX ADMIN — AER TRAVELER 1 APPLICATION(S): 0.5 SOLUTION RECTAL; TOPICAL at 13:55

## 2020-01-19 RX ADMIN — Medication 40 MILLIGRAM(S): at 06:19

## 2020-01-19 RX ADMIN — Medication 1000 MILLIGRAM(S): at 21:34

## 2020-01-19 RX ADMIN — LIDOCAINE 1 APPLICATION(S): 4 CREAM TOPICAL at 13:55

## 2020-01-19 RX ADMIN — Medication 1 APPLICATION(S): at 10:09

## 2020-01-19 RX ADMIN — DIPHENHYDRAMINE HYDROCHLORIDE AND LIDOCAINE HYDROCHLORIDE AND ALUMINUM HYDROXIDE AND MAGNESIUM HYDRO 5 MILLILITER(S): KIT at 06:21

## 2020-01-19 RX ADMIN — Medication 500000 UNIT(S): at 17:30

## 2020-01-19 RX ADMIN — AER TRAVELER 1 APPLICATION(S): 0.5 SOLUTION RECTAL; TOPICAL at 06:19

## 2020-01-19 RX ADMIN — Medication 25 MILLIGRAM(S): at 06:19

## 2020-01-19 RX ADMIN — Medication 1 APPLICATION(S): at 21:34

## 2020-01-19 RX ADMIN — PANTOPRAZOLE SODIUM 40 MILLIGRAM(S): 20 TABLET, DELAYED RELEASE ORAL at 12:14

## 2020-01-19 RX ADMIN — Medication 500000 UNIT(S): at 06:19

## 2020-01-19 RX ADMIN — DIPHENHYDRAMINE HYDROCHLORIDE AND LIDOCAINE HYDROCHLORIDE AND ALUMINUM HYDROXIDE AND MAGNESIUM HYDRO 5 MILLILITER(S): KIT at 17:32

## 2020-01-19 RX ADMIN — OXCARBAZEPINE 300 MILLIGRAM(S): 300 TABLET, FILM COATED ORAL at 21:01

## 2020-01-19 RX ADMIN — LIDOCAINE 1 APPLICATION(S): 4 CREAM TOPICAL at 06:20

## 2020-01-19 RX ADMIN — TIOTROPIUM BROMIDE 1 CAPSULE(S): 18 CAPSULE ORAL; RESPIRATORY (INHALATION) at 12:20

## 2020-01-19 RX ADMIN — Medication 1 SUPPOSITORY(S): at 06:26

## 2020-01-19 RX ADMIN — LIDOCAINE 1 APPLICATION(S): 4 CREAM TOPICAL at 21:32

## 2020-01-19 RX ADMIN — Medication 1 GRAM(S): at 17:30

## 2020-01-19 RX ADMIN — ENOXAPARIN SODIUM 40 MILLIGRAM(S): 100 INJECTION SUBCUTANEOUS at 12:13

## 2020-01-19 RX ADMIN — Medication 1 TABLET(S): at 17:30

## 2020-01-19 RX ADMIN — ZINC OXIDE 1 APPLICATION(S): 200 OINTMENT TOPICAL at 17:32

## 2020-01-19 NOTE — PROGRESS NOTE ADULT - ASSESSMENT
A:    59yFemale  HD # 25    Here for:    1. Acute on chronic hypoxic resp failure, now with  2. Acute hypercarbic respiratory failure  3. PNA  4. Anxiety    Pt critically ill requiring mechanical ventilation via HFNC, and now bipap. My immediate and continued bedside presence required for diagnosis and immediate management of her symptomatic hypercarbic respiratory failure to prevent further deterioration.    P:    Hold remeron tonight. Pt was written for remeron and PRN ativan for anxiety; she has not received ativan. Written for PRN zyprexa for agitation/anxiety, last received 1/17 1500. Per family, Pt newly lethargic tonight; this was investigated immediately by myself with an ABG, which showed partially compensated respiratory acidosis, placed on bipap with improvement. Hold remeron tonight. Avoid deleriogenic meds. Continue trileptal for seizures.  HD monitoring, continue HCTZ 25mg PO qd, inderal 2-0mg PO TID.  Started on bipap 14/5/.30. Pt was on HFNC FiO2 ~ 45%. FiO2 increased to 35% to maintain SpO2 > 92-94%, iPAP decreased to 12 due to Pt comfort. f/u AM ABG, improving mental status suggests resolving hypercarbia.    Dispo: Cont care.

## 2020-01-19 NOTE — PROGRESS NOTE ADULT - SUBJECTIVE AND OBJECTIVE BOX
INTERVAL HPI/OVERNIGHT EVENTS:  evets noted      MEDICATIONS  (STANDING):  enoxaparin Injectable 40 milliGRAM(s) SubCutaneous daily  FIRST- Mouthwash  BLM 5 milliLiter(s) Swish and Spit four times a day  hydrochlorothiazide 25 milliGRAM(s) Oral daily  hydrocortisone 2.5% Rectal Cream 1 Application(s) Rectal two times a day  hydrocortisone hemorrhoidal Suppository 1 Suppository(s) Rectal two times a day  influenza   Vaccine 0.5 milliLiter(s) IntraMuscular once  lactobacillus acidophilus 1 Tablet(s) Oral three times a day with meals  lidocaine 2% Gel 1 Application(s) Topical three times a day  mesalamine Suppository 1000 milliGRAM(s) Rectal at bedtime  mirtazapine Soltab 15 milliGRAM(s) Oral at bedtime  nystatin    Suspension [id number] Unit(s) Swish and Swallow four times a day  OXcarbazepine 300 milliGRAM(s) Oral two times a day  pantoprazole  Injectable 40 milliGRAM(s) IV Push daily  potassium phosphate / sodium phosphate powder 1 Packet(s) Oral three times a day  predniSONE   Tablet 40 milliGRAM(s) Oral daily  propranolol 20 milliGRAM(s) Oral three times a day  sucralfate 1 Gram(s) Oral two times a day  tiotropium 18 MICROgram(s) Capsule 1 Capsule(s) Inhalation daily  witch hazel Pads 1 Application(s) Topical three times a day  zinc oxide 20% Ointment 1 Application(s) Topical two times a day    MEDICATIONS  (PRN):  acetaminophen   Tablet .. 650 milliGRAM(s) Oral every 6 hours PRN Temp greater or equal to 38C (100.4F), Mild Pain (1 - 3)  ALBUTerol    0.083% 2.5 milliGRAM(s) Nebulizer every 6 hours PRN Shortness of Breath and/or Wheezing  morphine  - Injectable 1 milliGRAM(s) IV Push every 4 hours PRN Severe Pain (7 - 10)  OLANZapine Injectable 2.5 milliGRAM(s) IntraMuscular every 12 hours PRN agitation or anxiety  ondansetron    Tablet 4 milliGRAM(s) Oral every 12 hours PRN Nausea  simethicone 80 milliGRAM(s) Chew two times a day PRN Gas      Allergies    IV Contrast (Anaphylaxis)  shellfish. (Anaphylaxis)    Intolerances        Review of Systems:    General:  No wt loss, fevers, chills, night sweats,fatigue,   Eyes:  Good vision, no reported pain  ENT:  No sore throat, pain, runny nose, dysphagia  CV:  No pain, palpitatioins, hypo/hypertension  Resp:  No dyspnea, cough, tachypnea, wheezing  GI:  No pain, No nausea, No vomiting, No diarrhea, No constipatiion, No weight loss, No fever, No pruritis, No rectal bleeding, No tarry stools, No dysphagia,  :  No pain, bleeding, incontinence, nocturia  Muscle:  No pain, weakness  Neuro:  No weakness, tingling, memory problems  Psych:  No fatigue, insomnia, mood problems, depression  Endocrine:  No polyuria, polydypsia, cold/heat intolerance  Heme:  No petechiae, ecchymosis, easy bruisability  Skin:  No rash, tattoos, scars, edema      Vital Signs Last 24 Hrs  T(C): 36.4 (19 Jan 2020 07:32), Max: 36.8 (18 Jan 2020 23:21)  T(F): 97.5 (19 Jan 2020 07:32), Max: 98.2 (18 Jan 2020 23:21)  HR: 104 (19 Jan 2020 14:00) (88 - 125)  BP: 119/68 (19 Jan 2020 14:00) (84/52 - 146/87)  BP(mean): 88 (19 Jan 2020 14:00) (62 - 110)  RR: 30 (19 Jan 2020 14:00) (24 - 40)  SpO2: 100% (19 Jan 2020 14:00) (90% - 100%)    PHYSICAL EXAM:    Constitutional: NAD, well-developed  HEENT: EOMI, throat clear  Neck: No LAD, supple  Respiratory: CTA and P  Cardiovascular: S1 and S2, RRR, no M  Gastrointestinal: BS+, soft, NT/ND, neg HSM,  Extremities: No peripheral edema, neg clubing, cyanosis  Vascular: 2+ peripheral pulses  Neurological: A/O x 3, no focal deficits  Psychiatric: Normal mood, normal affect  Skin: No rashes      LABS:                        13.1   15.29 )-----------( 506      ( 19 Jan 2020 05:20 )             42.4     01-19    133<L>  |  90<L>  |  17  ----------------------------<  117<H>  4.4   |  38<H>  |  0.49<L>    Ca    9.2      19 Jan 2020 05:20  Phos  3.0     01-19  Mg     2.1     01-19            RADIOLOGY & ADDITIONAL TESTS:

## 2020-01-19 NOTE — PROGRESS NOTE ADULT - SUBJECTIVE AND OBJECTIVE BOX
CHIEF COMPLAINT/INTERVAL HISTORY:  Pt. seen and evaluated for acute hypoxic respiratory failure.  Pt. is lethargic.  Remains on HFNC.  Tolerating steroids.     REVIEW OF SYSTEMS:  No fever.  Rest of ROS not obtained 2/2 lethargy.     Vital Signs Last 24 Hrs  T(C): 36.4 (19 Jan 2020 07:32), Max: 36.8 (18 Jan 2020 23:21)  T(F): 97.5 (19 Jan 2020 07:32), Max: 98.2 (18 Jan 2020 23:21)  HR: 99 (19 Jan 2020 10:00) (88 - 125)  BP: 111/76 (19 Jan 2020 10:00) (92/64 - 146/87)  BP(mean): 89 (19 Jan 2020 10:00) (73 - 110)  RR: 36 (19 Jan 2020 10:00) (28 - 40)  SpO2: 95% (19 Jan 2020 10:00) (90% - 100%)    PHYSICAL EXAM:  GENERAL: lethargic  HEENT: conjunctiva and sclera clear, +HFNC  Chest: Diminished BS bilaterally, no wheezing  CV: S1S2, RRR,   GI: soft, +BS, NT/ND  Musculoskeletal: no edema, contracture of left wrist  Psychiatric: lethargic  Skin: warm and dry    LABS:                        13.1   15.29 )-----------( 506      ( 19 Jan 2020 05:20 )             42.4     01-19    133<L>  |  90<L>  |  17  ----------------------------<  117<H>  4.4   |  38<H>  |  0.49<L>    Ca    9.2      19 Jan 2020 05:20  Phos  3.0     01-19  Mg     2.1     01-19      Assessment and Plan:  -Septic shock and acute hypoxic respiratory failure 2/2 hospital acquired pneumonia, right pneumothorax, and ILD:  Shock has resolved.  Off vasopressors.  Completed course of IV antibiotics.  s/p removal of chest tube on 1/16.  Continue Prednisone 40mg PO daily, Spiriva inh daily, Albuterol Neb Q6h PRN, and O2 support.  ID, Pulmonary, and Intensivist f/u  -Colitis:  Completed course of IV antibiotics.  Continue mesalamine 1000mg OH QHS.  GI f/u  -Nausea and GERD:  Zofran 4mg PO Q12h PRN, Simethicone 80mg PO BID PRN, Carafate 1gm PO BID, and Protonix 40mg IV daily.   -Seizure disorder:  continue Trileptal 300mg PO BID.  Neurology f/u  -Thrush:  continue nystatin swish and swallow four times a day  -VTE ppx: Lovenox 40mg SQ daily

## 2020-01-19 NOTE — PROGRESS NOTE ADULT - SUBJECTIVE AND OBJECTIVE BOX
infectious diseases progress note:    ROMIE VIRAMONTES is a 59y y. o. Female patient    Patient with hypoxemia when off oxygen    Allergies    IV Contrast (Anaphylaxis)  shellfish. (Anaphylaxis)    Intolerances        ANTIBIOTICS/RELEVANT:  antimicrobials  nystatin    Suspension [id number] Unit(s) Swish and Swallow four times a day    immunologic:  influenza   Vaccine 0.5 milliLiter(s) IntraMuscular once    OTHER:  acetaminophen   Tablet .. 650 milliGRAM(s) Oral every 6 hours PRN  ALBUTerol    0.083% 2.5 milliGRAM(s) Nebulizer every 6 hours PRN  enoxaparin Injectable 40 milliGRAM(s) SubCutaneous daily  FIRST- Mouthwash  BLM 5 milliLiter(s) Swish and Spit four times a day  hydrochlorothiazide 25 milliGRAM(s) Oral daily  hydrocortisone 2.5% Rectal Cream 1 Application(s) Rectal two times a day  hydrocortisone hemorrhoidal Suppository 1 Suppository(s) Rectal two times a day  lactobacillus acidophilus 1 Tablet(s) Oral three times a day with meals  lidocaine 2% Gel 1 Application(s) Topical three times a day  mesalamine Suppository 1000 milliGRAM(s) Rectal at bedtime  mirtazapine Soltab 15 milliGRAM(s) Oral at bedtime  morphine  - Injectable 1 milliGRAM(s) IV Push every 4 hours PRN  OLANZapine Injectable 2.5 milliGRAM(s) IntraMuscular every 12 hours PRN  ondansetron    Tablet 4 milliGRAM(s) Oral every 12 hours PRN  OXcarbazepine 300 milliGRAM(s) Oral two times a day  pantoprazole  Injectable 40 milliGRAM(s) IV Push daily  potassium phosphate / sodium phosphate powder 1 Packet(s) Oral three times a day  predniSONE   Tablet 40 milliGRAM(s) Oral daily  propranolol 20 milliGRAM(s) Oral three times a day  simethicone 80 milliGRAM(s) Chew two times a day PRN  sucralfate 1 Gram(s) Oral two times a day  tiotropium 18 MICROgram(s) Capsule 1 Capsule(s) Inhalation daily  witch hazel Pads 1 Application(s) Topical three times a day  zinc oxide 20% Ointment 1 Application(s) Topical two times a day      Objective:  Vital Signs Last 24 Hrs  T(C): 36.4 (19 Jan 2020 07:32), Max: 36.8 (18 Jan 2020 23:21)  T(F): 97.5 (19 Jan 2020 07:32), Max: 98.2 (18 Jan 2020 23:21)  HR: 99 (19 Jan 2020 10:00) (88 - 125)  BP: 111/76 (19 Jan 2020 10:00) (92/64 - 146/87)  BP(mean): 89 (19 Jan 2020 10:00) (73 - 110)  RR: 36 (19 Jan 2020 10:00) (19 - 40)  SpO2: 95% (19 Jan 2020 10:00) (90% - 100%)    T(C): 36.4 (01-19-20 @ 07:32), Max: 36.9 (01-17-20 @ 12:15)  T(C): 36.4 (01-19-20 @ 07:32), Max: 36.9 (01-17-20 @ 12:15)  T(C): 36.4 (01-19-20 @ 07:32), Max: 36.9 (01-17-20 @ 12:15)    PHYSICAL EXAM:  Constitutional: Well-developed, well nourished  Eyes: PERRLA, EOMI  Ear/Nose/Throat: oropharynx normal	  Neck: no JVD, no lymphadenopathy, supple  Respiratory: no accessory muscle use  Cardiovascular: RRR,   Gastrointestinal: soft, NT  Extremities: no clubbing, no cyanosis, edema absent      LABS:                        13.1   15.29 )-----------( 506      ( 19 Jan 2020 05:20 )             42.4       15.29 01-19 @ 05:20  24.28 01-18 @ 08:01  10.64 01-17 @ 05:38  8.36 01-16 @ 05:43  8.54 01-15 @ 05:19  9.59 01-14 @ 05:41  12.57 01-13 @ 05:28      01-19    133<L>  |  90<L>  |  17  ----------------------------<  117<H>  4.4   |  38<H>  |  0.49<L>    Ca    9.2      19 Jan 2020 05:20  Phos  3.0     01-19  Mg     2.1     01-19        Creatinine, Serum: 0.49 mg/dL (01-19-20 @ 05:20)  Creatinine, Serum: 0.47 mg/dL (01-18-20 @ 04:59)  Creatinine, Serum: 0.41 mg/dL (01-17-20 @ 05:38)  Creatinine, Serum: 0.37 mg/dL (01-16-20 @ 05:43)  Creatinine, Serum: 0.34 mg/dL (01-15-20 @ 05:19)  Creatinine, Serum: 0.38 mg/dL (01-14-20 @ 05:41)  Creatinine, Serum: 0.41 mg/dL (01-13-20 @ 05:28)                MICROBIOLOGY:              RADIOLOGY & ADDITIONAL STUDIES:

## 2020-01-19 NOTE — PROGRESS NOTE ADULT - SUBJECTIVE AND OBJECTIVE BOX
Date/Time Patient Seen:  		  Referring MD:   Data Reviewed	       Patient is a 59y old  Female who presents with a chief complaint of pneumothorax, colitis, UTI (18 Jan 2020 21:15)      Subjective/HPI     PAST MEDICAL & SURGICAL HISTORY:  Interstitial lung disease: on home o2 prn  NHL (non-Hodgkin's lymphoma): Agem 43 sp chemo/rt/stem cell  Transient cerebral ischemia, unspecified type  Mitral prolapse  Pulmonary disease  History of tonsillectomy  History of appendectomy        Medication list         MEDICATIONS  (STANDING):  enoxaparin Injectable 40 milliGRAM(s) SubCutaneous daily  FIRST- Mouthwash  BLM 5 milliLiter(s) Swish and Spit four times a day  hydrochlorothiazide 25 milliGRAM(s) Oral daily  hydrocortisone 2.5% Rectal Cream 1 Application(s) Rectal two times a day  hydrocortisone hemorrhoidal Suppository 1 Suppository(s) Rectal two times a day  influenza   Vaccine 0.5 milliLiter(s) IntraMuscular once  lactobacillus acidophilus 1 Tablet(s) Oral three times a day with meals  lidocaine 2% Gel 1 Application(s) Topical three times a day  mesalamine Suppository 1000 milliGRAM(s) Rectal at bedtime  mirtazapine Soltab 15 milliGRAM(s) Oral at bedtime  nystatin    Suspension [id number] Unit(s) Swish and Swallow four times a day  OXcarbazepine 300 milliGRAM(s) Oral two times a day  pantoprazole  Injectable 40 milliGRAM(s) IV Push daily  potassium phosphate / sodium phosphate powder 1 Packet(s) Oral three times a day  predniSONE   Tablet 40 milliGRAM(s) Oral daily  propranolol 20 milliGRAM(s) Oral three times a day  sucralfate 1 Gram(s) Oral two times a day  tiotropium 18 MICROgram(s) Capsule 1 Capsule(s) Inhalation daily  witch hazel Pads 1 Application(s) Topical three times a day  zinc oxide 20% Ointment 1 Application(s) Topical two times a day    MEDICATIONS  (PRN):  acetaminophen   Tablet .. 650 milliGRAM(s) Oral every 6 hours PRN Temp greater or equal to 38C (100.4F), Mild Pain (1 - 3)  ALBUTerol    0.083% 2.5 milliGRAM(s) Nebulizer every 6 hours PRN Shortness of Breath and/or Wheezing  morphine  - Injectable 1 milliGRAM(s) IV Push every 4 hours PRN Severe Pain (7 - 10)  OLANZapine Injectable 2.5 milliGRAM(s) IntraMuscular every 12 hours PRN agitation or anxiety  ondansetron    Tablet 4 milliGRAM(s) Oral every 12 hours PRN Nausea  simethicone 80 milliGRAM(s) Chew two times a day PRN Gas         Vitals log        ICU Vital Signs Last 24 Hrs  T(C): 36.4 (19 Jan 2020 04:30), Max: 36.8 (18 Jan 2020 23:21)  T(F): 97.5 (19 Jan 2020 04:30), Max: 98.2 (18 Jan 2020 23:21)  HR: 104 (19 Jan 2020 05:00) (88 - 125)  BP: 141/83 (19 Jan 2020 05:00) (85/55 - 146/87)  BP(mean): 106 (19 Jan 2020 05:00) (66 - 110)  ABP: --  ABP(mean): --  RR: 32 (19 Jan 2020 05:00) (19 - 38)  SpO2: 92% (19 Jan 2020 05:00) (90% - 100%)           Input and Output:  I&O's Detail    17 Jan 2020 07:01  -  18 Jan 2020 07:00  --------------------------------------------------------  IN:    dexmedetomidine Infusion: 11 mL    Oral Fluid: 260 mL  Total IN: 271 mL    OUT:  Total OUT: 0 mL    Total NET: 271 mL      18 Jan 2020 07:01  -  19 Jan 2020 06:34  --------------------------------------------------------  IN:    Oral Fluid: 330 mL  Total IN: 330 mL    OUT:  Total OUT: 0 mL    Total NET: 330 mL          Lab Data                        13.1   15.29 )-----------( 506      ( 19 Jan 2020 05:20 )             42.4     01-19    133<L>  |  90<L>  |  17  ----------------------------<  117<H>  4.4   |  38<H>  |  0.49<L>    Ca    9.2      19 Jan 2020 05:20  Phos  3.0     01-19  Mg     2.1     01-19              Review of Systems	      Objective     Physical Examination    heart s1s2  lung dec BS  abd soft      Pertinent Lab findings & Imaging      Shauna:  NO   Adequate UO     I&O's Detail    17 Jan 2020 07:01  -  18 Jan 2020 07:00  --------------------------------------------------------  IN:    dexmedetomidine Infusion: 11 mL    Oral Fluid: 260 mL  Total IN: 271 mL    OUT:  Total OUT: 0 mL    Total NET: 271 mL      18 Jan 2020 07:01  -  19 Jan 2020 06:34  --------------------------------------------------------  IN:    Oral Fluid: 330 mL  Total IN: 330 mL    OUT:  Total OUT: 0 mL    Total NET: 330 mL               Discussed with:     Cultures:	        Radiology

## 2020-01-19 NOTE — PROGRESS NOTE ADULT - SUBJECTIVE AND OBJECTIVE BOX
ICU Progress Note    HPI:    S:    Pt seen and examined  HD # 25  PMHx nterstitial lung disease hx of pneumothorax (on 2L NC at home), hx pulmonary nodules, Meniere's disease, Non-Hodgkin's lymphoma (SCD/XRT/chemo at [facility] 2003), hx of CVA (3/2019 - residual L sided weakness, unable to use her L arm), not on ASA or Plavix due to GIB, Seizure disorder, tachycardia, MVP, hx of chronic SDH  Pt here for weakness, weight loss, nausea, diarrhea  Admitted to ICU for resp failure    Prolonged hospital course without much improvement  Remains dependent on NIV, on HFNC    1/19 PM: Worsening lethargy this evening per family and RN. ABG shows acute partially compensated respiratory acidosis. Changed from HFNC to bipap with some improvement in mental status.    ROS: Unable to determine 2/2 Pt condition    Allergies    IV Contrast (Anaphylaxis)  shellfish. (Anaphylaxis)    Intolerances        MEDICATIONS  (STANDING):  enoxaparin Injectable 40 milliGRAM(s) SubCutaneous daily  FIRST- Mouthwash  BLM 5 milliLiter(s) Swish and Spit four times a day  hydrochlorothiazide 25 milliGRAM(s) Oral daily  hydrocortisone 2.5% Rectal Cream 1 Application(s) Rectal two times a day  hydrocortisone hemorrhoidal Suppository 1 Suppository(s) Rectal two times a day  influenza   Vaccine 0.5 milliLiter(s) IntraMuscular once  lactobacillus acidophilus 1 Tablet(s) Oral three times a day with meals  lidocaine 2% Gel 1 Application(s) Topical three times a day  mesalamine Suppository 1000 milliGRAM(s) Rectal at bedtime  mirtazapine Soltab 15 milliGRAM(s) Oral at bedtime  nystatin    Suspension 602511 Unit(s) Swish and Swallow four times a day  OXcarbazepine 300 milliGRAM(s) Oral two times a day  pantoprazole  Injectable 40 milliGRAM(s) IV Push daily  potassium phosphate / sodium phosphate powder 1 Packet(s) Oral three times a day  predniSONE   Tablet 40 milliGRAM(s) Oral daily  propranolol 20 milliGRAM(s) Oral three times a day  sucralfate 1 Gram(s) Oral two times a day  tiotropium 18 MICROgram(s) Capsule 1 Capsule(s) Inhalation daily  witch hazel Pads 1 Application(s) Topical three times a day  zinc oxide 20% Ointment 1 Application(s) Topical two times a day    MEDICATIONS  (PRN):  acetaminophen   Tablet .. 650 milliGRAM(s) Oral every 6 hours PRN Temp greater or equal to 38C (100.4F), Mild Pain (1 - 3)  ALBUTerol    0.083% 2.5 milliGRAM(s) Nebulizer every 6 hours PRN Shortness of Breath and/or Wheezing  morphine  - Injectable 1 milliGRAM(s) IV Push every 4 hours PRN Severe Pain (7 - 10)  OLANZapine Injectable 2.5 milliGRAM(s) IntraMuscular every 12 hours PRN agitation or anxiety  ondansetron    Tablet 4 milliGRAM(s) Oral every 12 hours PRN Nausea  simethicone 80 milliGRAM(s) Chew two times a day PRN Gas      Drug Dosing Weight  Height (cm): 175.26 (26 Dec 2019 14:30)  Weight (kg): 54.4 (26 Dec 2019 14:30)  BMI (kg/m2): 17.7 (26 Dec 2019 14:30)  BSA (m2): 1.66 (26 Dec 2019 14:30)    PAST MEDICAL & SURGICAL HISTORY:  Interstitial lung disease: on home o2 prn  NHL (non-Hodgkin's lymphoma): Agem 43 sp chemo/rt/stem cell  Transient cerebral ischemia, unspecified type  Mitral prolapse  History of tonsillectomy  History of appendectomy      FAMILY HISTORY:  Family history of stroke  Family history of breast cancer: Mother      ROS: See HPI; otherwise, all systems reviewed and negative.    O:    ICU Vital Signs Last 24 Hrs  T(C): 37 (19 Jan 2020 20:13), Max: 37 (19 Jan 2020 20:13)  T(F): 98.6 (19 Jan 2020 20:13), Max: 98.6 (19 Jan 2020 20:13)  HR: 96 (19 Jan 2020 23:15) (85 - 109)  BP: 114/65 (19 Jan 2020 23:00) (84/52 - 146/87)  BP(mean): 84 (19 Jan 2020 23:00) (62 - 110)  ABP: --  ABP(mean): --  RR: 26 (19 Jan 2020 23:00) (22 - 40)  SpO2: 94% (19 Jan 2020 23:15) (90% - 100%)      ABG - ( 19 Jan 2020 22:52 )  pH, Arterial: 7.32  pH, Blood: x     /  pCO2: 99    /  pO2: 132   / HCO3: 44    / Base Excess: 22.2  /  SaO2: 98                  I&O's Detail    18 Jan 2020 07:01  -  19 Jan 2020 07:00  --------------------------------------------------------  IN:    Oral Fluid: 380 mL  Total IN: 380 mL    OUT:  Total OUT: 0 mL    Total NET: 380 mL      19 Jan 2020 07:01  -  19 Jan 2020 23:44  --------------------------------------------------------  IN:    Oral Fluid: 570 mL  Total IN: 570 mL    OUT:  Total OUT: 0 mL    Total NET: 570 mL              PE:    Constitutional: Chronically ill appearing F lying in bed.  Neck: No JVD, trachea midline. + bipap mask in place.  Respiratory: CTA B/L good BS B/L no W/R/R.  Cardiovascular: S1S2+ RRR no M/R/G.  Gastrointestinal: Soft, NTND.  Extremities: No peripheral edema, No cyanosis, clubbing.  Neurological: Awake eyes open, nodding yes and no.  Skin: No rashes, warm, moist.    LABS:    CBC Full  -  ( 19 Jan 2020 05:20 )  WBC Count : 15.29 K/uL  RBC Count : 4.63 M/uL  Hemoglobin : 13.1 g/dL  Hematocrit : 42.4 %  Platelet Count - Automated : 506 K/uL  Mean Cell Volume : 91.6 fl  Mean Cell Hemoglobin : 28.3 pg  Mean Cell Hemoglobin Concentration : 30.9 gm/dL  Auto Neutrophil # : x  Auto Lymphocyte # : x  Auto Monocyte # : x  Auto Eosinophil # : x  Auto Basophil # : x  Auto Neutrophil % : x  Auto Lymphocyte % : x  Auto Monocyte % : x  Auto Eosinophil % : x  Auto Basophil % : x    01-19    133<L>  |  90<L>  |  17  ----------------------------<  117<H>  4.4   |  38<H>  |  0.49<L>    Ca    9.2      19 Jan 2020 05:20  Phos  3.0     01-19  Mg     2.1     01-19          CAPILLARY BLOOD GLUCOSE

## 2020-01-19 NOTE — PROGRESS NOTE ADULT - SUBJECTIVE AND OBJECTIVE BOX
Interval events:     Review of Systems:  Constitutional: no fever, chills, fatigue  Neuro: no headache, numbness, weakness  Resp: no cough, wheezing, shortness of breath  CVS: no chest pain, palpitations, leg swelling  GI: no abdominal pain, nausea, vomiting, diarrhea   : no dysuria, frequency, incontinence  Skin: no itching, burning, rashes, or lesions   Msk: no joint pain or swelling  Psych: no depression, anxiety    T(F): 97.5 (01-19-20 @ 07:32), Max: 98.2 (01-18-20 @ 23:21)  HR: 99 (01-19-20 @ 13:11) (88 - 125)  BP: 105/62 (01-19-20 @ 13:11) (84/52 - 146/87)  RR: 27 (01-19-20 @ 13:11) (24 - 40)  SpO2: 97% (01-19-20 @ 13:11) (90% - 100%)  Wt(kg): --        CAPILLARY BLOOD GLUCOSE      POCT Blood Glucose.: 131 mg/dL (17 Jan 2020 17:14)    I&O's Summary    18 Jan 2020 07:01  -  19 Jan 2020 07:00  --------------------------------------------------------  IN: 380 mL / OUT: 0 mL / NET: 380 mL        Physical Exam:     Gen: more alert today but still lethargic, similar mental status to earlier in week; frail, chronically ill appearing  CV: tachy, regular  Pulm: decreased without wheezes/rhonchi  GI: soft, nt, nd  Ext: no edema, moves all extremities to command  Skin: no rash, petechiae     Meds:  enoxaparin Injectable 40 milliGRAM(s) SubCutaneous daily  nystatin    Suspension 547354 Unit(s) Swish and Swallow four times a day  hydrochlorothiazide 25 milliGRAM(s) Oral daily  propranolol 20 milliGRAM(s) Oral three times a day  predniSONE   Tablet 40 milliGRAM(s) Oral daily  ALBUTerol    0.083% 2.5 milliGRAM(s) Nebulizer every 6 hours PRN  tiotropium 18 MICROgram(s) Capsule 1 Capsule(s) Inhalation daily  acetaminophen   Tablet .. 650 milliGRAM(s) Oral every 6 hours PRN  mirtazapine Soltab 15 milliGRAM(s) Oral at bedtime  morphine  - Injectable 1 milliGRAM(s) IV Push every 4 hours PRN  OLANZapine Injectable 2.5 milliGRAM(s) IntraMuscular every 12 hours PRN  ondansetron    Tablet 4 milliGRAM(s) Oral every 12 hours PRN  OXcarbazepine 300 milliGRAM(s) Oral two times a day  mesalamine Suppository 1000 milliGRAM(s) Rectal at bedtime  pantoprazole  Injectable 40 milliGRAM(s) IV Push daily  simethicone 80 milliGRAM(s) Chew two times a day PRN  sucralfate 1 Gram(s) Oral two times a day  potassium phosphate / sodium phosphate powder 1 Packet(s) Oral three times a day  influenza   Vaccine 0.5 milliLiter(s) IntraMuscular once  FIRST- Mouthwash  BLM 5 milliLiter(s) Swish and Spit four times a day  hydrocortisone 2.5% Rectal Cream 1 Application(s) Rectal two times a day  hydrocortisone hemorrhoidal Suppository 1 Suppository(s) Rectal two times a day  lidocaine 2% Gel 1 Application(s) Topical three times a day  witch hazel Pads 1 Application(s) Topical three times a day  zinc oxide 20% Ointment 1 Application(s) Topical two times a day  lactobacillus acidophilus 1 Tablet(s) Oral three times a day with meals                                13.1   15.29 )-----------( 506      ( 19 Jan 2020 05:20 )             42.4       01-19    133<L>  |  90<L>  |  17  ----------------------------<  117<H>  4.4   |  38<H>  |  0.49<L>    Ca    9.2      19 Jan 2020 05:20  Phos  3.0     01-19  Mg     2.1     01-19        Bedside Lung U/S: irregular pleural surface, trace effusion b/l, no consolidations  Bedside Cardiac U/S: poor windows, unable to clearly visualize      CENTRAL LINE: N     LUKE: N      A-LINE: N         GLOBAL ISSUE/BEST PRACTICE:  Analgesia: n/a  Sedation: n/a  CAM-ICU: negative  HOB elevation: yes  Stress ulcer prophylaxis: n/a  VTE prophylaxis: lovenox  Glycemic control: n/a  Nutrition: dysphagia 1 pureed nectar thick fluids    CODE STATUS: full code

## 2020-01-19 NOTE — CHART NOTE - NSCHARTNOTEFT_GEN_A_CORE
I was approached by family and RN this evening  Concern for lethargy    No new sedating meds given    ABG checked  Acute hypercarbic resp failure; pH 7.32/99/132    Given near normal pH, Pt likely retaining around 70-80    Placed on bipap 14/5/.30    Mental status began improving immediately  Will maintain bipap overnight, check ABG in AM    Discussed with family at bedside, all questions answered

## 2020-01-19 NOTE — PROGRESS NOTE ADULT - PROBLEM SELECTOR PLAN 1
remains frail and weak and on High Flow NC - no improvement noted  s/p ABX  s/p Chest Tube  remains on steroids  end stage ILD - chronic lung disease  would rec - Multidisciplinary Team family meeting to discuss GOC and prognosis and direction of care  I am available to accommodate for team / family meeting  supportive ICU management meanwhile -

## 2020-01-19 NOTE — PROGRESS NOTE ADULT - ASSESSMENT
59F h/o ILD (unknown etiology, no Bx), chronic hypoxemic respiratory failure, known chronic R PTX which has been conservatively managed, remote NHL (s/p XRT, chemo and SCT), previous CVA with R sided weakness, seizure do on trileptal, chronic SDH v hygromas admitted 12/26 with colitis/proctitis and treated with CTX and flagyl.  Hospital course complicated by development of HCAP, acute on chronic hypoxemic respiratory failure requiring high flow NC and agitation/delirium    Neuro: mental status improved from yesterday - remains lethargic and weak appearing though following commands and answering questions appropriately, AOx3 - will continue remeron at night, zyprexa only for agitation; per family at bedside mental status similar to what they noted at home for recent months though notably weaker   CV: BP improved with HCTZ, continue home propanolol   Pulm: remains on similar high flow settings, can likely wean FiO2 though pt has proven that she is still reliant on it as when she takes it off her sats drop precipitously, R apical pneumo remains unchanged; likely now all related to underlying ILD, unclear if much more improvement possible at this time, will continue prednisone 40mg for ILD s/p 3 day pulse therapy; continue duonebs, mucomyst, hypertonic for secretions clearance; continue to wean as tolerates    GI: contine PO diet with assistance; continue mesalamine and witch hazel for colitits  Renal: no acute issues  ID: zosyn course completed 1/14; continue FRST mouthwash for oral thrush through 1/21  Dispo: pt to remain in ICU at this time due to high oxygen requirements  GOC: ongoing discussion with family - extensive conversation with pt's daughter and  at bedside regarding overall prognosis, at this time they are uncertain as to what pt would want if unable to wean further from high flow or if resp status further decompensated to requiring intubation. At current, pt remains FULL CODE

## 2020-01-20 LAB
ANION GAP SERPL CALC-SCNC: <3 MMOL/L — LOW (ref 5–17)
BASE EXCESS BLDA CALC-SCNC: 22.1 MMOL/L — HIGH (ref -2–2)
BLOOD GAS COMMENTS ARTERIAL: SIGNIFICANT CHANGE UP
BUN SERPL-MCNC: 16 MG/DL — SIGNIFICANT CHANGE UP (ref 7–23)
CALCIUM SERPL-MCNC: 9 MG/DL — SIGNIFICANT CHANGE UP (ref 8.5–10.1)
CHLORIDE SERPL-SCNC: 87 MMOL/L — LOW (ref 96–108)
CO2 SERPL-SCNC: >45 MMOL/L — CRITICAL HIGH (ref 22–31)
CREAT SERPL-MCNC: 0.44 MG/DL — LOW (ref 0.5–1.3)
GLUCOSE SERPL-MCNC: 107 MG/DL — HIGH (ref 70–99)
HCO3 BLDA-SCNC: 44 MMOL/L — HIGH (ref 23–27)
HCT VFR BLD CALC: 37.5 % — SIGNIFICANT CHANGE UP (ref 34.5–45)
HGB BLD-MCNC: 11.6 G/DL — SIGNIFICANT CHANGE UP (ref 11.5–15.5)
HOROWITZ INDEX BLDA+IHG-RTO: SIGNIFICANT CHANGE UP
MAGNESIUM SERPL-MCNC: 2.1 MG/DL — SIGNIFICANT CHANGE UP (ref 1.6–2.6)
MCHC RBC-ENTMCNC: 28.4 PG — SIGNIFICANT CHANGE UP (ref 27–34)
MCHC RBC-ENTMCNC: 30.9 GM/DL — LOW (ref 32–36)
MCV RBC AUTO: 91.7 FL — SIGNIFICANT CHANGE UP (ref 80–100)
NRBC # BLD: 0 /100 WBCS — SIGNIFICANT CHANGE UP (ref 0–0)
PCO2 BLDA: 88 MMHG — CRITICAL HIGH (ref 32–46)
PH BLDA: 7.36 — SIGNIFICANT CHANGE UP (ref 7.35–7.45)
PHOSPHATE SERPL-MCNC: 2.7 MG/DL — SIGNIFICANT CHANGE UP (ref 2.5–4.5)
PLATELET # BLD AUTO: 513 K/UL — HIGH (ref 150–400)
PO2 BLDA: 94 MMHG — SIGNIFICANT CHANGE UP (ref 74–108)
POTASSIUM SERPL-MCNC: 4 MMOL/L — SIGNIFICANT CHANGE UP (ref 3.5–5.3)
POTASSIUM SERPL-SCNC: 4 MMOL/L — SIGNIFICANT CHANGE UP (ref 3.5–5.3)
RBC # BLD: 4.09 M/UL — SIGNIFICANT CHANGE UP (ref 3.8–5.2)
RBC # FLD: 14.5 % — SIGNIFICANT CHANGE UP (ref 10.3–14.5)
SAO2 % BLDA: 97 % — HIGH (ref 92–96)
SODIUM SERPL-SCNC: 135 MMOL/L — SIGNIFICANT CHANGE UP (ref 135–145)
WBC # BLD: 15.59 K/UL — HIGH (ref 3.8–10.5)
WBC # FLD AUTO: 15.59 K/UL — HIGH (ref 3.8–10.5)

## 2020-01-20 PROCEDURE — 99291 CRITICAL CARE FIRST HOUR: CPT

## 2020-01-20 PROCEDURE — 99233 SBSQ HOSP IP/OBS HIGH 50: CPT

## 2020-01-20 RX ORDER — ONDANSETRON 8 MG/1
4 TABLET, FILM COATED ORAL ONCE
Refills: 0 | Status: COMPLETED | OUTPATIENT
Start: 2020-01-20 | End: 2020-01-20

## 2020-01-20 RX ADMIN — LIDOCAINE 1 APPLICATION(S): 4 CREAM TOPICAL at 13:28

## 2020-01-20 RX ADMIN — Medication 500000 UNIT(S): at 17:18

## 2020-01-20 RX ADMIN — MIRTAZAPINE 15 MILLIGRAM(S): 45 TABLET, ORALLY DISINTEGRATING ORAL at 22:02

## 2020-01-20 RX ADMIN — Medication 1 TABLET(S): at 11:39

## 2020-01-20 RX ADMIN — Medication 1 PACKET(S): at 15:01

## 2020-01-20 RX ADMIN — Medication 1 PACKET(S): at 22:03

## 2020-01-20 RX ADMIN — Medication 500000 UNIT(S): at 05:33

## 2020-01-20 RX ADMIN — Medication 1 APPLICATION(S): at 22:02

## 2020-01-20 RX ADMIN — ONDANSETRON 4 MILLIGRAM(S): 8 TABLET, FILM COATED ORAL at 17:18

## 2020-01-20 RX ADMIN — Medication 1 SUPPOSITORY(S): at 17:21

## 2020-01-20 RX ADMIN — Medication 1 TABLET(S): at 17:17

## 2020-01-20 RX ADMIN — DIPHENHYDRAMINE HYDROCHLORIDE AND LIDOCAINE HYDROCHLORIDE AND ALUMINUM HYDROXIDE AND MAGNESIUM HYDRO 5 MILLILITER(S): KIT at 11:39

## 2020-01-20 RX ADMIN — ENOXAPARIN SODIUM 40 MILLIGRAM(S): 100 INJECTION SUBCUTANEOUS at 13:27

## 2020-01-20 RX ADMIN — Medication 500000 UNIT(S): at 11:38

## 2020-01-20 RX ADMIN — TIOTROPIUM BROMIDE 1 CAPSULE(S): 18 CAPSULE ORAL; RESPIRATORY (INHALATION) at 11:40

## 2020-01-20 RX ADMIN — Medication 1 SUPPOSITORY(S): at 05:32

## 2020-01-20 RX ADMIN — OXCARBAZEPINE 300 MILLIGRAM(S): 300 TABLET, FILM COATED ORAL at 15:01

## 2020-01-20 RX ADMIN — ONDANSETRON 4 MILLIGRAM(S): 8 TABLET, FILM COATED ORAL at 06:50

## 2020-01-20 RX ADMIN — AER TRAVELER 1 APPLICATION(S): 0.5 SOLUTION RECTAL; TOPICAL at 13:28

## 2020-01-20 RX ADMIN — Medication 1000 MILLIGRAM(S): at 22:03

## 2020-01-20 RX ADMIN — Medication 650 MILLIGRAM(S): at 17:48

## 2020-01-20 RX ADMIN — AER TRAVELER 1 APPLICATION(S): 0.5 SOLUTION RECTAL; TOPICAL at 05:33

## 2020-01-20 RX ADMIN — PANTOPRAZOLE SODIUM 40 MILLIGRAM(S): 20 TABLET, DELAYED RELEASE ORAL at 11:39

## 2020-01-20 RX ADMIN — LIDOCAINE 1 APPLICATION(S): 4 CREAM TOPICAL at 22:04

## 2020-01-20 RX ADMIN — Medication 500000 UNIT(S): at 23:10

## 2020-01-20 RX ADMIN — Medication 1 GRAM(S): at 17:17

## 2020-01-20 RX ADMIN — DIPHENHYDRAMINE HYDROCHLORIDE AND LIDOCAINE HYDROCHLORIDE AND ALUMINUM HYDROXIDE AND MAGNESIUM HYDRO 5 MILLILITER(S): KIT at 05:33

## 2020-01-20 RX ADMIN — AER TRAVELER 1 APPLICATION(S): 0.5 SOLUTION RECTAL; TOPICAL at 22:03

## 2020-01-20 RX ADMIN — LIDOCAINE 1 APPLICATION(S): 4 CREAM TOPICAL at 05:33

## 2020-01-20 RX ADMIN — OXCARBAZEPINE 300 MILLIGRAM(S): 300 TABLET, FILM COATED ORAL at 22:02

## 2020-01-20 RX ADMIN — SIMETHICONE 80 MILLIGRAM(S): 80 TABLET, CHEWABLE ORAL at 17:17

## 2020-01-20 RX ADMIN — Medication 1 APPLICATION(S): at 11:39

## 2020-01-20 RX ADMIN — Medication 650 MILLIGRAM(S): at 17:18

## 2020-01-20 RX ADMIN — ZINC OXIDE 1 APPLICATION(S): 200 OINTMENT TOPICAL at 05:32

## 2020-01-20 RX ADMIN — ZINC OXIDE 1 APPLICATION(S): 200 OINTMENT TOPICAL at 17:19

## 2020-01-20 RX ADMIN — DIPHENHYDRAMINE HYDROCHLORIDE AND LIDOCAINE HYDROCHLORIDE AND ALUMINUM HYDROXIDE AND MAGNESIUM HYDRO 5 MILLILITER(S): KIT at 17:19

## 2020-01-20 NOTE — PROGRESS NOTE ADULT - PROBLEM SELECTOR PLAN 4
slp eval noted  cont dysphagia diet as tolerated  gerd/aspiration prec   cont ppi  assistance/encouragement w meals prn

## 2020-01-20 NOTE — PROGRESS NOTE ADULT - ASSESSMENT
A:    59yFemale  HD # 26    Here for:    1. Acute on chronic hypoxic resp failure, now with  2. Acute hypercarbic respiratory failure  3. PNA  4. Anxiety    P:    Acute on chronic but progressive illness    Avoid deleriogenic meds. More awake and alert tonight. Periods of anxiety, agitation and anger. Remeron 15mg PO qhs started by psychiatry several days ago; if remains awake will give. Continue trileptal 300mg PO BID.  HD monitoring, stable hemodynamics. Continue inderal 20mg PO TID.   Placed on HFNC via vapotherm, current settings 30 LPM 30% FiO2, SpO2 92%. Avoid hyperoxia. Pulmonary on board. Progressive, terminal ILD.  Continue dysphagia diet.  Off abx. Afebrile.  Consistent leukocytosis but on steroids. s/p course of zosyn.  DVT ppx with lovenox. PUD ppx with protonix.  Continue prednisone 40mg PO qd.  Maintain PIV, avendano, NIV device.  f/u AM labs.    Continue care.   Very poor overall prognosis.   I am told awaiting word back from [facility] RE: transfer.     Dispo: Cont care. A:    59yFemale  HD # 26    Here for:    1. Acute on chronic hypoxic resp failure, now with  2. Acute hypercarbic respiratory failure  3. PNA  4. Anxiety  5. Interstitial lung disease, end stage    P:    Acute on chronic but progressive illness    Avoid deleriogenic meds. More awake and alert tonight. Periods of anxiety, agitation and anger. Remeron 15mg PO qhs started by psychiatry several days ago; if remains awake will give. Continue trileptal 300mg PO BID.  HD monitoring, stable hemodynamics. Continue inderal 20mg PO TID.   Placed on HFNC via vapotherm, current settings 30 LPM 30% FiO2, SpO2 92%. Avoid hyperoxia. Pulmonary on board. Progressive, terminal ILD.  Continue dysphagia diet.  Off abx. Afebrile.  Consistent leukocytosis but on steroids. s/p course of zosyn.  DVT ppx with lovenox. PUD ppx with protonix.  Continue prednisone 40mg PO qd.  Maintain PIV, avendano, NIV device.  f/u AM labs.    Continue care.   Very poor overall prognosis.   I am told awaiting word back from [facility] RE: transfer.     Dispo: Cont care.

## 2020-01-20 NOTE — CHART NOTE - NSCHARTNOTEFT_GEN_A_CORE
Multiple events throughout night    Placed on bipap for hypercarbia  Responded by waking up, became acutely agitated  Required deescalation by several staff members, family and myself for > 2 hours with minimal effectiveness    Ended up removing bipap 2/2 non compliance and placing back on HFNC    Long discussion had with family, Pt sister and brother  I discussed that Pt has ILD; this is a degenerative condition which will never get better  She has failed treatment with steroids, abx and is DEPENDENT on HFNC  She is now hypercarbic    They will not entertain end of life discussion at this time, remain hopeful for recovery  They would like transfer to [facility] under care of her pulmonologist Dr Hazel    Will defer this to day team    Discussed with Dr Rashid

## 2020-01-20 NOTE — PROGRESS NOTE ADULT - SUBJECTIVE AND OBJECTIVE BOX
ICU Progress Note    HPI:    S:    Pt seen and examined  HD # 26  PMHx nterstitial lung disease hx of pneumothorax (on 2L NC at home), hx pulmonary nodules, Meniere's disease, Non-Hodgkin's lymphoma (SCD/XRT/chemo at [facility] 2003), hx of CVA (3/2019 - residual L sided weakness, unable to use her L arm), not on ASA or Plavix due to GIB, Seizure disorder, tachycardia, MVP, hx of chronic SDH  Pt here for weakness, weight loss, nausea, diarrhea  Admitted to ICU for resp failure    Prolonged hospital course without much improvement  Remains dependent on NIV, on HFNC    1/19 PM: Worsening lethargy this evening per family and RN. ABG shows acute partially compensated respiratory acidosis. Changed from HFNC to bipap with some improvement in mental status.  1/20 PM: Changed to HFNC via vapotherm device today. [facility] called RE: transfer; I am told awaiting a call back. More awake and alert tonight.    ROS: Unable to determine 2/2 Pt condition    Allergies    IV Contrast (Anaphylaxis)  shellfish. (Anaphylaxis)    Intolerances        MEDICATIONS  (STANDING):  enoxaparin Injectable 40 milliGRAM(s) SubCutaneous daily  FIRST- Mouthwash  BLM 5 milliLiter(s) Swish and Spit four times a day  hydrocortisone 2.5% Rectal Cream 1 Application(s) Rectal two times a day  hydrocortisone hemorrhoidal Suppository 1 Suppository(s) Rectal two times a day  influenza   Vaccine 0.5 milliLiter(s) IntraMuscular once  lactobacillus acidophilus 1 Tablet(s) Oral three times a day with meals  lidocaine 2% Gel 1 Application(s) Topical three times a day  mesalamine Suppository 1000 milliGRAM(s) Rectal at bedtime  mirtazapine Soltab 15 milliGRAM(s) Oral at bedtime  nystatin    Suspension 332879 Unit(s) Swish and Swallow four times a day  OXcarbazepine 300 milliGRAM(s) Oral two times a day  pantoprazole  Injectable 40 milliGRAM(s) IV Push daily  potassium phosphate / sodium phosphate powder 1 Packet(s) Oral three times a day  predniSONE   Tablet 40 milliGRAM(s) Oral daily  propranolol 20 milliGRAM(s) Oral three times a day  sucralfate 1 Gram(s) Oral two times a day  tiotropium 18 MICROgram(s) Capsule 1 Capsule(s) Inhalation daily  witch hazel Pads 1 Application(s) Topical three times a day  zinc oxide 20% Ointment 1 Application(s) Topical two times a day    MEDICATIONS  (PRN):  acetaminophen   Tablet .. 650 milliGRAM(s) Oral every 6 hours PRN Temp greater or equal to 38C (100.4F), Mild Pain (1 - 3)  ALBUTerol    0.083% 2.5 milliGRAM(s) Nebulizer every 6 hours PRN Shortness of Breath and/or Wheezing  OLANZapine Injectable 2.5 milliGRAM(s) IntraMuscular every 12 hours PRN agitation or anxiety  ondansetron    Tablet 4 milliGRAM(s) Oral every 12 hours PRN Nausea  simethicone 80 milliGRAM(s) Chew two times a day PRN Gas      Drug Dosing Weight  Height (cm): 175.26 (26 Dec 2019 14:30)  Weight (kg): 54.4 (26 Dec 2019 14:30)  BMI (kg/m2): 17.7 (26 Dec 2019 14:30)  BSA (m2): 1.66 (26 Dec 2019 14:30)    PAST MEDICAL & SURGICAL HISTORY:  Interstitial lung disease: on home o2 prn  NHL (non-Hodgkin's lymphoma): Agem 43 sp chemo/rt/stem cell  Transient cerebral ischemia, unspecified type  Mitral prolapse  History of tonsillectomy  History of appendectomy      FAMILY HISTORY:  Family history of stroke  Family history of breast cancer: Mother          ROS: See HPI; otherwise, all systems reviewed and negative.    O:    ICU Vital Signs Last 24 Hrs  T(C): 36.3 (20 Jan 2020 15:10), Max: 37 (19 Jan 2020 20:13)  T(F): 97.3 (20 Jan 2020 15:10), Max: 98.6 (19 Jan 2020 20:13)  HR: 106 (20 Jan 2020 19:00) (85 - 121)  BP: 97/63 (20 Jan 2020 19:00) (95/56 - 159/83)  BP(mean): 75 (20 Jan 2020 19:00) (71 - 108)  ABP: --  ABP(mean): --  RR: 33 (20 Jan 2020 19:00) (22 - 50)  SpO2: 89% (20 Jan 2020 19:00) (89% - 100%)      ABG - ( 20 Jan 2020 05:04 )  pH, Arterial: 7.36  pH, Blood: x     /  pCO2: 88    /  pO2: 94    / HCO3: 44    / Base Excess: 22.1  /  SaO2: 97                  I&O's Detail    19 Jan 2020 07:01  -  20 Jan 2020 07:00  --------------------------------------------------------  IN:    Oral Fluid: 570 mL  Total IN: 570 mL    OUT:  Total OUT: 0 mL    Total NET: 570 mL              PE:    Constitutional: Chronically ill appearing F lying in bed.  Neck: No JVD, trachea midline. + HFNC (vapotherm) in place.  Respiratory: CTA B/L good BS B/L no W/R/R.  Cardiovascular: S1S2+ RRR no M/R/G.  Gastrointestinal: Soft, NTND.  Extremities: No peripheral edema, No cyanosis, clubbing.  Neurological: Awake eyes open, nodding yes and no.  Skin: No rashes, warm, moist.  LABS:    CBC Full  -  ( 20 Jan 2020 05:26 )  WBC Count : 15.59 K/uL  RBC Count : 4.09 M/uL  Hemoglobin : 11.6 g/dL  Hematocrit : 37.5 %  Platelet Count - Automated : 513 K/uL  Mean Cell Volume : 91.7 fl  Mean Cell Hemoglobin : 28.4 pg  Mean Cell Hemoglobin Concentration : 30.9 gm/dL  Auto Neutrophil # : x  Auto Lymphocyte # : x  Auto Monocyte # : x  Auto Eosinophil # : x  Auto Basophil # : x  Auto Neutrophil % : x  Auto Lymphocyte % : x  Auto Monocyte % : x  Auto Eosinophil % : x  Auto Basophil % : x    01-20    135  |  87<L>  |  16  ----------------------------<  107<H>  4.0   |  >45<HH>  |  0.44<L>    Ca    9.0      20 Jan 2020 05:26  Phos  2.7     01-20  Mg     2.1     01-20          CAPILLARY BLOOD GLUCOSE

## 2020-01-20 NOTE — PROGRESS NOTE ADULT - ASSESSMENT
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  Hospital course complicated by development of HCAP, acute on chronic hypoxemic respiratory failure requiring high flow NC, and agitation/delirium    1. Neuro: stable delirium, continue mirtazapine. Olanzapine prn. Continue Trileptal, level therapeutic on 1/11  2. CV: HD stable, continue propranolol  3. Pulm: trial off BiPAP today, now on high flow NC (Vapotherm), 40 LPM, 40% FiO2. Avoid hyperoxia. Continue prednisone 40 mg daily. S/p pulse methylprednisolone last week. Unchanged R apical pneumothorax, s/p R chest tube last week. Continue Spiriva daily and albuterol nebs prn  4. GI: continue pureed with nectar-thick diet, needs assistance with meals. Continue mesalamine and witch hazel for colitis  5. Renal: stable kidney function and lytes, continue to monitor  6. ID: s/p course of Zosyn, completed 1/14. No evidence of active infection currently, observe off systemic antibiotics. Continue First Mouthwash for oral thrush  7. Heme: enoxaparin for DVT ppx, improved leukocytosis  8. Endo: no active issues  9. Skin: no lines or avendano  10. Dispo: full code, discussed with patient and family at bedside, very poor prognosis. Trying to arrange for transfer to [place] where pulmonologist Dr. Bettye Hazel has been taking care of patient. Dr. Hazel & Dr. Coelho are trying to arrange for MICU bed  CC time spent: 45 min

## 2020-01-20 NOTE — PROGRESS NOTE ADULT - SUBJECTIVE AND OBJECTIVE BOX
INTERVAL HPI/OVERNIGHT EVENTS:  interim events noted  family at bedside  on bipap  no acute gi issues per nursing    MEDICATIONS  (STANDING):  enoxaparin Injectable 40 milliGRAM(s) SubCutaneous daily  FIRST- Mouthwash  BLM 5 milliLiter(s) Swish and Spit four times a day  hydrocortisone 2.5% Rectal Cream 1 Application(s) Rectal two times a day  hydrocortisone hemorrhoidal Suppository 1 Suppository(s) Rectal two times a day  influenza   Vaccine 0.5 milliLiter(s) IntraMuscular once  lactobacillus acidophilus 1 Tablet(s) Oral three times a day with meals  lidocaine 2% Gel 1 Application(s) Topical three times a day  mesalamine Suppository 1000 milliGRAM(s) Rectal at bedtime  mirtazapine Soltab 15 milliGRAM(s) Oral at bedtime  nystatin    Suspension [id number] Unit(s) Swish and Swallow four times a day  OXcarbazepine 300 milliGRAM(s) Oral two times a day  pantoprazole  Injectable 40 milliGRAM(s) IV Push daily  potassium phosphate / sodium phosphate powder 1 Packet(s) Oral three times a day  predniSONE   Tablet 40 milliGRAM(s) Oral daily  propranolol 20 milliGRAM(s) Oral three times a day  sucralfate 1 Gram(s) Oral two times a day  tiotropium 18 MICROgram(s) Capsule 1 Capsule(s) Inhalation daily  witch hazel Pads 1 Application(s) Topical three times a day  zinc oxide 20% Ointment 1 Application(s) Topical two times a day    MEDICATIONS  (PRN):  acetaminophen   Tablet .. 650 milliGRAM(s) Oral every 6 hours PRN Temp greater or equal to 38C (100.4F), Mild Pain (1 - 3)  ALBUTerol    0.083% 2.5 milliGRAM(s) Nebulizer every 6 hours PRN Shortness of Breath and/or Wheezing  OLANZapine Injectable 2.5 milliGRAM(s) IntraMuscular every 12 hours PRN agitation or anxiety  ondansetron    Tablet 4 milliGRAM(s) Oral every 12 hours PRN Nausea  simethicone 80 milliGRAM(s) Chew two times a day PRN Gas      Allergies    IV Contrast (Anaphylaxis)  shellfish. (Anaphylaxis)    Intolerances        Review of Systems:  unable to obtain in entirety       Vital Signs Last 24 Hrs  T(C): 36.7 (20 Jan 2020 07:26), Max: 37 (19 Jan 2020 20:13)  T(F): 98 (20 Jan 2020 07:26), Max: 98.6 (19 Jan 2020 20:13)  HR: 109 (20 Jan 2020 08:16) (85 - 120)  BP: 119/76 (20 Jan 2020 08:00) (84/52 - 159/83)  BP(mean): 94 (20 Jan 2020 08:00) (62 - 108)  RR: 55 (20 Jan 2020 08:00) (22 - 55)  SpO2: 96% (20 Jan 2020 08:16) (94% - 100%)    PHYSICAL EXAM:  Constitutional: lying in bed   HEENT: ncat  Neck: No LAD  Gastrointestinal: soft nt nd  Extremities: No peripheral edema  Neurological: awake alert    LABS:                        11.6   15.59 )-----------( 513      ( 20 Jan 2020 05:26 )             37.5     01-20    135  |  87<L>  |  16  ----------------------------<  107<H>  4.0   |  >45<HH>  |  0.44<L>    Ca    9.0      20 Jan 2020 05:26  Phos  2.7     01-20  Mg     2.1     01-20            RADIOLOGY & ADDITIONAL TESTS:

## 2020-01-20 NOTE — PROGRESS NOTE ADULT - SUBJECTIVE AND OBJECTIVE BOX
Interval events: no acute events overnight, no fevers or chills. Wants to take BiPAP off    Review of Systems:  Constitutional: no fever, chills, fatigue  Neuro: no headache, numbness, weakness  Resp: +dyspnea  CVS: no chest pain, palpitations, leg swelling  GI: no abdominal pain, nausea, vomiting, diarrhea   : no dysuria, frequency, incontinence  Skin: no itching, burning, rashes, or lesions   Msk: no joint pain or swelling  Psych: +anxiety    T(F): 98.2 (01-20-20 @ 12:21), Max: 98.6 (01-19-20 @ 20:13)  HR: 121 (01-20-20 @ 14:12) (85 - 121)  BP: 125/72 (01-20-20 @ 14:00) (95/56 - 159/83)  RR: 47 (01-20-20 @ 14:00) (22 - 50)  SpO2: 97% (01-20-20 @ 14:12) (94% - 100%)    I&O's Summary    19 Jan 2020 07:01  -  20 Jan 2020 07:00  --------------------------------------------------------  IN: 570 mL / OUT: 0 mL / NET: 570 mL    Physical Exam:     Gen: frail, chronically ill-appearing; appears anxious with mild respiratory distress; awake and alert, but confused  Neuro: CN II-XII grossly intact; left upper extremity weakness; motor strength 5/5 in RUE and bilateral LE's  HEENT: NC/AT; EOMI; MMM; PERRL  CV: normal S1 & S2; tachycardic; regular rhythm  Pulm: diffuse rales on the right, decreased breath sounds on left  GI: soft; NT/ND  Ext: no edema; pulses intact  Skin: warm, well perfused    Meds:  enoxaparin Injectable 40 milliGRAM(s) SubCutaneous daily  nystatin    Suspension [id number] Unit(s) Swish and Swallow four times a day  propranolol 20 milliGRAM(s) Oral three times a day  predniSONE   Tablet 40 milliGRAM(s) Oral daily  ALBUTerol    0.083% 2.5 milliGRAM(s) Nebulizer every 6 hours PRN  tiotropium 18 MICROgram(s) Capsule 1 Capsule(s) Inhalation daily  acetaminophen   Tablet .. 650 milliGRAM(s) Oral every 6 hours PRN  mirtazapine Soltab 15 milliGRAM(s) Oral at bedtime  OLANZapine Injectable 2.5 milliGRAM(s) IntraMuscular every 12 hours PRN  ondansetron    Tablet 4 milliGRAM(s) Oral every 12 hours PRN  OXcarbazepine 300 milliGRAM(s) Oral two times a day  mesalamine Suppository 1000 milliGRAM(s) Rectal at bedtime  pantoprazole  Injectable 40 milliGRAM(s) IV Push daily  simethicone 80 milliGRAM(s) Chew two times a day PRN  sucralfate 1 Gram(s) Oral two times a day  potassium phosphate / sodium phosphate powder 1 Packet(s) Oral three times a day  influenza   Vaccine 0.5 milliLiter(s) IntraMuscular once  FIRST- Mouthwash  BLM 5 milliLiter(s) Swish and Spit four times a day  hydrocortisone 2.5% Rectal Cream 1 Application(s) Rectal two times a day  hydrocortisone hemorrhoidal Suppository 1 Suppository(s) Rectal two times a day  lidocaine 2% Gel 1 Application(s) Topical three times a day  witch hazel Pads 1 Application(s) Topical three times a day  zinc oxide 20% Ointment 1 Application(s) Topical two times a day  lactobacillus acidophilus 1 Tablet(s) Oral three times a day with meals                          11.6   15.59 )-----------( 513      ( 20 Jan 2020 05:26 )             37.5       01-20    135  |  87<L>  |  16  ----------------------------<  107<H>  4.0   |  >45<HH>  |  0.44<L>    Ca    9.0      20 Jan 2020 05:26  Phos  2.7     01-20  Mg     2.1     01-20    ABG - ( 20 Jan 2020 05:04 )  pH, Arterial: 7.36  pH, Blood: x     /  pCO2: 88    /  pO2: 94    / HCO3: 44    / Base Excess: 22.1  /  SaO2: 97          CENTRAL LINE: N     MARLY: N      A-LINE: N         GLOBAL ISSUE/BEST PRACTICE:  Analgesia: n/a  Sedation: n/a  CAM-ICU: negative  HOB elevation: yes  Stress ulcer prophylaxis: n/a  VTE prophylaxis: lovenox  Glycemic control: n/a  Nutrition: dysphagia 1 pureed nectar thick fluids    CODE STATUS: full code

## 2020-01-20 NOTE — PROGRESS NOTE ADULT - SUBJECTIVE AND OBJECTIVE BOX
Date/Time Patient Seen:  		  Referring MD:   Data Reviewed	       Patient is a 59y old  Female who presents with a chief complaint of pneumothorax, colitis, UTI (19 Jan 2020 23:43)      Subjective/HPI     PAST MEDICAL & SURGICAL HISTORY:  Interstitial lung disease: on home o2 prn  NHL (non-Hodgkin's lymphoma): Agem 43 sp chemo/rt/stem cell  Transient cerebral ischemia, unspecified type  Mitral prolapse  Pulmonary disease  History of tonsillectomy  History of appendectomy        Medication list         MEDICATIONS  (STANDING):  enoxaparin Injectable 40 milliGRAM(s) SubCutaneous daily  FIRST- Mouthwash  BLM 5 milliLiter(s) Swish and Spit four times a day  hydrocortisone 2.5% Rectal Cream 1 Application(s) Rectal two times a day  hydrocortisone hemorrhoidal Suppository 1 Suppository(s) Rectal two times a day  influenza   Vaccine 0.5 milliLiter(s) IntraMuscular once  lactobacillus acidophilus 1 Tablet(s) Oral three times a day with meals  lidocaine 2% Gel 1 Application(s) Topical three times a day  mesalamine Suppository 1000 milliGRAM(s) Rectal at bedtime  mirtazapine Soltab 15 milliGRAM(s) Oral at bedtime  nystatin    Suspension [id number] Unit(s) Swish and Swallow four times a day  OXcarbazepine 300 milliGRAM(s) Oral two times a day  pantoprazole  Injectable 40 milliGRAM(s) IV Push daily  potassium phosphate / sodium phosphate powder 1 Packet(s) Oral three times a day  predniSONE   Tablet 40 milliGRAM(s) Oral daily  propranolol 20 milliGRAM(s) Oral three times a day  sucralfate 1 Gram(s) Oral two times a day  tiotropium 18 MICROgram(s) Capsule 1 Capsule(s) Inhalation daily  witch hazel Pads 1 Application(s) Topical three times a day  zinc oxide 20% Ointment 1 Application(s) Topical two times a day    MEDICATIONS  (PRN):  acetaminophen   Tablet .. 650 milliGRAM(s) Oral every 6 hours PRN Temp greater or equal to 38C (100.4F), Mild Pain (1 - 3)  ALBUTerol    0.083% 2.5 milliGRAM(s) Nebulizer every 6 hours PRN Shortness of Breath and/or Wheezing  OLANZapine Injectable 2.5 milliGRAM(s) IntraMuscular every 12 hours PRN agitation or anxiety  ondansetron    Tablet 4 milliGRAM(s) Oral every 12 hours PRN Nausea  simethicone 80 milliGRAM(s) Chew two times a day PRN Gas         Vitals log        ICU Vital Signs Last 24 Hrs  T(C): 37 (20 Jan 2020 05:00), Max: 37 (19 Jan 2020 20:13)  T(F): 98.6 (20 Jan 2020 05:00), Max: 98.6 (19 Jan 2020 20:13)  HR: 105 (20 Jan 2020 06:00) (85 - 109)  BP: 110/70 (20 Jan 2020 06:00) (84/52 - 159/83)  BP(mean): 85 (20 Jan 2020 06:00) (62 - 108)  ABP: --  ABP(mean): --  RR: 29 (20 Jan 2020 06:00) (22 - 40)  SpO2: 96% (20 Jan 2020 06:00) (94% - 100%)           Input and Output:  I&O's Detail    18 Jan 2020 07:01  -  19 Jan 2020 07:00  --------------------------------------------------------  IN:    Oral Fluid: 380 mL  Total IN: 380 mL    OUT:  Total OUT: 0 mL    Total NET: 380 mL      19 Jan 2020 07:01  -  20 Jan 2020 06:38  --------------------------------------------------------  IN:    Oral Fluid: 570 mL  Total IN: 570 mL    OUT:  Total OUT: 0 mL    Total NET: 570 mL          Lab Data                        11.6   15.59 )-----------( 513      ( 20 Jan 2020 05:26 )             37.5     01-20    135  |  87<L>  |  16  ----------------------------<  107<H>  4.0   |  >45<HH>  |  0.44<L>    Ca    9.0      20 Jan 2020 05:26  Phos  2.7     01-20  Mg     2.1     01-20      ABG - ( 20 Jan 2020 05:04 )  pH, Arterial: 7.36  pH, Blood: x     /  pCO2: 88    /  pO2: 94    / HCO3: 44    / Base Excess: 22.1  /  SaO2: 97                      Review of Systems	      Objective     Physical Examination    heart 1s2  lung dec BS  abd soft  head nc  on high flow nc and then bipap    Pertinent Lab findings & Imaging      Shauna:  NO   Adequate UO     I&O's Detail    18 Jan 2020 07:01  -  19 Jan 2020 07:00  --------------------------------------------------------  IN:    Oral Fluid: 380 mL  Total IN: 380 mL    OUT:  Total OUT: 0 mL    Total NET: 380 mL      19 Jan 2020 07:01  -  20 Jan 2020 06:38  --------------------------------------------------------  IN:    Oral Fluid: 570 mL  Total IN: 570 mL    OUT:  Total OUT: 0 mL    Total NET: 570 mL               Discussed with:     Cultures:	        Radiology

## 2020-01-20 NOTE — PROGRESS NOTE ADULT - PROBLEM SELECTOR PLAN 1
ct showing stercoral colitis c/b rectal pain in setting of hemorrhoids  resolved  monitoring off bowel regimen   cont lido and hydrocortisone cream   cont anusol supp bid, witch hazel pads prn  cont canasa suppository  up to date w colonoscopy, 4/2019 showed only non bleeding internal hemorrhoids

## 2020-01-20 NOTE — PROGRESS NOTE ADULT - PROBLEM SELECTOR PLAN 1
chr lung disease - ILD - no improvement - on high flow NC and then BIPAP   BG and labs and imaging reviewed with family this am - discussed GOC and possible transfer to [facility]  prognosis is extremely POOR  pt completed steroids, and ABX and R chest tube on this admission   she has undiagnosed chr and progressive lung disease - as per my discussion with her outpatient Pulm MD - Dr. Hazel at [facility] - refused multiple attempts at eval in the past  at present - I am afraid - there are very limited options for Dx and or Therapy  pt remains full code  ICU management  consideration for transfer

## 2020-01-21 LAB
ANION GAP SERPL CALC-SCNC: 4 MMOL/L — LOW (ref 5–17)
BASE EXCESS BLDA CALC-SCNC: 19.4 MMOL/L — HIGH (ref -2–2)
BASOPHILS # BLD AUTO: 0.01 K/UL — SIGNIFICANT CHANGE UP (ref 0–0.2)
BASOPHILS NFR BLD AUTO: 0.1 % — SIGNIFICANT CHANGE UP (ref 0–2)
BLOOD GAS COMMENTS ARTERIAL: SIGNIFICANT CHANGE UP
BUN SERPL-MCNC: 17 MG/DL — SIGNIFICANT CHANGE UP (ref 7–23)
CALCIUM SERPL-MCNC: 9.4 MG/DL — SIGNIFICANT CHANGE UP (ref 8.5–10.1)
CHLORIDE SERPL-SCNC: 86 MMOL/L — LOW (ref 96–108)
CO2 SERPL-SCNC: 44 MMOL/L — HIGH (ref 22–31)
CREAT SERPL-MCNC: 0.51 MG/DL — SIGNIFICANT CHANGE UP (ref 0.5–1.3)
EOSINOPHIL # BLD AUTO: 0.15 K/UL — SIGNIFICANT CHANGE UP (ref 0–0.5)
EOSINOPHIL NFR BLD AUTO: 1.5 % — SIGNIFICANT CHANGE UP (ref 0–6)
GLUCOSE SERPL-MCNC: 105 MG/DL — HIGH (ref 70–99)
HCO3 BLDA-SCNC: 41 MMOL/L — HIGH (ref 23–27)
HCT VFR BLD CALC: 38.4 % — SIGNIFICANT CHANGE UP (ref 34.5–45)
HGB BLD-MCNC: 11.7 G/DL — SIGNIFICANT CHANGE UP (ref 11.5–15.5)
HOROWITZ INDEX BLDA+IHG-RTO: 30 — SIGNIFICANT CHANGE UP
IMM GRANULOCYTES NFR BLD AUTO: 0.8 % — SIGNIFICANT CHANGE UP (ref 0–1.5)
LYMPHOCYTES # BLD AUTO: 0.84 K/UL — LOW (ref 1–3.3)
LYMPHOCYTES # BLD AUTO: 8.4 % — LOW (ref 13–44)
MAGNESIUM SERPL-MCNC: 2 MG/DL — SIGNIFICANT CHANGE UP (ref 1.6–2.6)
MCHC RBC-ENTMCNC: 27.9 PG — SIGNIFICANT CHANGE UP (ref 27–34)
MCHC RBC-ENTMCNC: 30.5 GM/DL — LOW (ref 32–36)
MCV RBC AUTO: 91.4 FL — SIGNIFICANT CHANGE UP (ref 80–100)
MONOCYTES # BLD AUTO: 0.84 K/UL — SIGNIFICANT CHANGE UP (ref 0–0.9)
MONOCYTES NFR BLD AUTO: 8.4 % — SIGNIFICANT CHANGE UP (ref 2–14)
NEUTROPHILS # BLD AUTO: 8.1 K/UL — HIGH (ref 1.8–7.4)
NEUTROPHILS NFR BLD AUTO: 80.8 % — HIGH (ref 43–77)
NRBC # BLD: 0 /100 WBCS — SIGNIFICANT CHANGE UP (ref 0–0)
PCO2 BLDA: 83 MMHG — CRITICAL HIGH (ref 32–46)
PH BLDA: 7.36 — SIGNIFICANT CHANGE UP (ref 7.35–7.45)
PHOSPHATE SERPL-MCNC: 2.3 MG/DL — LOW (ref 2.5–4.5)
PLATELET # BLD AUTO: 495 K/UL — HIGH (ref 150–400)
PO2 BLDA: 61 MMHG — LOW (ref 74–108)
POTASSIUM SERPL-MCNC: 4 MMOL/L — SIGNIFICANT CHANGE UP (ref 3.5–5.3)
POTASSIUM SERPL-SCNC: 4 MMOL/L — SIGNIFICANT CHANGE UP (ref 3.5–5.3)
RBC # BLD: 4.2 M/UL — SIGNIFICANT CHANGE UP (ref 3.8–5.2)
RBC # FLD: 14.2 % — SIGNIFICANT CHANGE UP (ref 10.3–14.5)
RHEUMATOID FACT SERPL-ACNC: <10 IU/ML — SIGNIFICANT CHANGE UP (ref 0–13)
SAO2 % BLDA: 90 % — LOW (ref 92–96)
SODIUM SERPL-SCNC: 134 MMOL/L — LOW (ref 135–145)
WBC # BLD: 10.02 K/UL — SIGNIFICANT CHANGE UP (ref 3.8–10.5)
WBC # FLD AUTO: 10.02 K/UL — SIGNIFICANT CHANGE UP (ref 3.8–10.5)

## 2020-01-21 PROCEDURE — 71045 X-RAY EXAM CHEST 1 VIEW: CPT | Mod: 26

## 2020-01-21 PROCEDURE — 99233 SBSQ HOSP IP/OBS HIGH 50: CPT

## 2020-01-21 PROCEDURE — 99233 SBSQ HOSP IP/OBS HIGH 50: CPT | Mod: GC

## 2020-01-21 RX ORDER — POTASSIUM PHOSPHATE, MONOBASIC POTASSIUM PHOSPHATE, DIBASIC 236; 224 MG/ML; MG/ML
15 INJECTION, SOLUTION INTRAVENOUS ONCE
Refills: 0 | Status: COMPLETED | OUTPATIENT
Start: 2020-01-21 | End: 2020-01-21

## 2020-01-21 RX ORDER — POTASSIUM PHOSPHATE, MONOBASIC POTASSIUM PHOSPHATE, DIBASIC 236; 224 MG/ML; MG/ML
15 INJECTION, SOLUTION INTRAVENOUS ONCE
Refills: 0 | Status: DISCONTINUED | OUTPATIENT
Start: 2020-01-21 | End: 2020-01-21

## 2020-01-21 RX ADMIN — ENOXAPARIN SODIUM 40 MILLIGRAM(S): 100 INJECTION SUBCUTANEOUS at 13:18

## 2020-01-21 RX ADMIN — Medication 650 MILLIGRAM(S): at 03:49

## 2020-01-21 RX ADMIN — Medication 650 MILLIGRAM(S): at 21:38

## 2020-01-21 RX ADMIN — AER TRAVELER 1 APPLICATION(S): 0.5 SOLUTION RECTAL; TOPICAL at 13:20

## 2020-01-21 RX ADMIN — LIDOCAINE 1 APPLICATION(S): 4 CREAM TOPICAL at 13:19

## 2020-01-21 RX ADMIN — Medication 40 MILLIGRAM(S): at 05:42

## 2020-01-21 RX ADMIN — Medication 500000 UNIT(S): at 13:18

## 2020-01-21 RX ADMIN — DIPHENHYDRAMINE HYDROCHLORIDE AND LIDOCAINE HYDROCHLORIDE AND ALUMINUM HYDROXIDE AND MAGNESIUM HYDRO 5 MILLILITER(S): KIT at 13:19

## 2020-01-21 RX ADMIN — Medication 1 SUPPOSITORY(S): at 05:42

## 2020-01-21 RX ADMIN — DIPHENHYDRAMINE HYDROCHLORIDE AND LIDOCAINE HYDROCHLORIDE AND ALUMINUM HYDROXIDE AND MAGNESIUM HYDRO 5 MILLILITER(S): KIT at 17:38

## 2020-01-21 RX ADMIN — Medication 1 GRAM(S): at 17:36

## 2020-01-21 RX ADMIN — SIMETHICONE 80 MILLIGRAM(S): 80 TABLET, CHEWABLE ORAL at 21:08

## 2020-01-21 RX ADMIN — OXCARBAZEPINE 300 MILLIGRAM(S): 300 TABLET, FILM COATED ORAL at 21:08

## 2020-01-21 RX ADMIN — PANTOPRAZOLE SODIUM 40 MILLIGRAM(S): 20 TABLET, DELAYED RELEASE ORAL at 13:19

## 2020-01-21 RX ADMIN — POTASSIUM PHOSPHATE, MONOBASIC POTASSIUM PHOSPHATE, DIBASIC 62.5 MILLIMOLE(S): 236; 224 INJECTION, SOLUTION INTRAVENOUS at 07:55

## 2020-01-21 RX ADMIN — LIDOCAINE 1 APPLICATION(S): 4 CREAM TOPICAL at 05:43

## 2020-01-21 RX ADMIN — Medication 650 MILLIGRAM(S): at 21:08

## 2020-01-21 RX ADMIN — POTASSIUM PHOSPHATE, MONOBASIC POTASSIUM PHOSPHATE, DIBASIC 62.5 MILLIMOLE(S): 236; 224 INJECTION, SOLUTION INTRAVENOUS at 07:03

## 2020-01-21 RX ADMIN — DIPHENHYDRAMINE HYDROCHLORIDE AND LIDOCAINE HYDROCHLORIDE AND ALUMINUM HYDROXIDE AND MAGNESIUM HYDRO 5 MILLILITER(S): KIT at 05:43

## 2020-01-21 RX ADMIN — ZINC OXIDE 1 APPLICATION(S): 200 OINTMENT TOPICAL at 17:38

## 2020-01-21 RX ADMIN — ONDANSETRON 4 MILLIGRAM(S): 8 TABLET, FILM COATED ORAL at 20:28

## 2020-01-21 RX ADMIN — Medication 1 SUPPOSITORY(S): at 17:40

## 2020-01-21 RX ADMIN — AER TRAVELER 1 APPLICATION(S): 0.5 SOLUTION RECTAL; TOPICAL at 05:43

## 2020-01-21 RX ADMIN — Medication 1 TABLET(S): at 13:18

## 2020-01-21 RX ADMIN — TIOTROPIUM BROMIDE 1 CAPSULE(S): 18 CAPSULE ORAL; RESPIRATORY (INHALATION) at 13:19

## 2020-01-21 RX ADMIN — Medication 1 GRAM(S): at 05:42

## 2020-01-21 RX ADMIN — Medication 1 TABLET(S): at 17:37

## 2020-01-21 RX ADMIN — ZINC OXIDE 1 APPLICATION(S): 200 OINTMENT TOPICAL at 05:44

## 2020-01-21 RX ADMIN — Medication 1 TABLET(S): at 07:53

## 2020-01-21 RX ADMIN — Medication 650 MILLIGRAM(S): at 04:47

## 2020-01-21 RX ADMIN — Medication 1 PACKET(S): at 05:43

## 2020-01-21 RX ADMIN — Medication 500000 UNIT(S): at 17:36

## 2020-01-21 RX ADMIN — Medication 500000 UNIT(S): at 05:43

## 2020-01-21 RX ADMIN — OXCARBAZEPINE 300 MILLIGRAM(S): 300 TABLET, FILM COATED ORAL at 09:36

## 2020-01-21 RX ADMIN — Medication 1 APPLICATION(S): at 09:36

## 2020-01-21 NOTE — PROGRESS NOTE ADULT - ATTENDING COMMENTS
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  Hospital course complicated by development of HCAP, acute on chronic hypoxemic respiratory failure requiring high flow NC, and agitation/delirium. Now improved.    1. Neuro: improved delirium, now AAOx3. D/c olanzapine. Continue mirtazapine. Continue Trileptal for seizures, level therapeutic on 1/11  2. CV: HD stable, continue propranolol  3. Pulm: wean FiO2 to NC@2LPM, goal SpO2>90%. Needs nocturnal BiPAP 12/5 FiO2 30% every night, trial of nasal pillows. CXR with improved right apical pneumothorax and small left apical pneumothorax. I discussed with patient's daughter to discuss with patient regarding R chest tube as patient has declined interventions in the past. Will need to be cautious with NIPPV given pneumothoraces. Avoid hyperoxia. Continue prednisone 40 mg daily, will need slow taper. S/p pulse methylprednisolone last week. Check ESR, CRP, RF, CCP, SAJI, dsDNA, Rosales, centromere, scleroderma, sjogren's, myomarker panel. Continue Spiriva daily and albuterol nebs prn  4. GI: continue pureed with nectar-thick diet, needs assistance with meals, f/up S&S. Continue mesalamine and witch hazel for colitis  5. Renal: stable kidney function and lytes, continue to monitor  6. ID: s/p course of Zosyn, completed 1/14. No evidence of active infection currently, observe off systemic antibiotics. Continue First Mouthwash for oral thrush  7. Heme: enoxaparin for DVT ppx, resolved leukocytosis  8. Endo: no active issues  9. Skin: no lines or avendano  10. Dispo: full code, discussed with patient and family at bedside, very poor prognosis given advanced lung disease. Recommend to hold off on transfer to [place] at this time given that patient is clinically improved and next step in her treatment is physical therapy and rehab  CC time spent: 35 min

## 2020-01-21 NOTE — PROGRESS NOTE ADULT - SUBJECTIVE AND OBJECTIVE BOX
Neurology follow up note    ROMIE [id number]      Interval History:    Patient feels ok n sitting in chair seen with mother     MEDICATIONS    acetaminophen   Tablet .. 650 milliGRAM(s) Oral every 6 hours PRN  ALBUTerol    0.083% 2.5 milliGRAM(s) Nebulizer every 6 hours PRN  enoxaparin Injectable 40 milliGRAM(s) SubCutaneous daily  FIRST- Mouthwash  BLM 5 milliLiter(s) Swish and Spit four times a day  hydrocortisone 2.5% Rectal Cream 1 Application(s) Rectal two times a day  hydrocortisone hemorrhoidal Suppository 1 Suppository(s) Rectal two times a day  influenza   Vaccine 0.5 milliLiter(s) IntraMuscular once  lactobacillus acidophilus 1 Tablet(s) Oral three times a day with meals  lidocaine 2% Gel 1 Application(s) Topical three times a day  mesalamine Suppository 1000 milliGRAM(s) Rectal at bedtime  mirtazapine Soltab 15 milliGRAM(s) Oral at bedtime  nystatin    Suspension [id number] Unit(s) Swish and Swallow four times a day  ondansetron    Tablet 4 milliGRAM(s) Oral every 12 hours PRN  OXcarbazepine 300 milliGRAM(s) Oral two times a day  pantoprazole  Injectable 40 milliGRAM(s) IV Push daily  predniSONE   Tablet 40 milliGRAM(s) Oral daily  propranolol 20 milliGRAM(s) Oral three times a day  simethicone 80 milliGRAM(s) Chew two times a day PRN  sucralfate 1 Gram(s) Oral two times a day  tiotropium 18 MICROgram(s) Capsule 1 Capsule(s) Inhalation daily  witch hazel Pads 1 Application(s) Topical three times a day  zinc oxide 20% Ointment 1 Application(s) Topical two times a day      Allergies    IV Contrast (Anaphylaxis)  shellfish. (Anaphylaxis)    Intolerances            Vital Signs Last 24 Hrs  T(C): 36.8 (21 Jan 2020 11:29), Max: 37.1 (20 Jan 2020 23:40)  T(F): 98.2 (21 Jan 2020 11:29), Max: 98.8 (20 Jan 2020 23:40)  HR: 90 (21 Jan 2020 11:00) (76 - 121)  BP: 147/97 (21 Jan 2020 11:00) (97/63 - 147/97)  BP(mean): 117 (21 Jan 2020 11:00) (75 - 117)  RR: 43 (21 Jan 2020 11:00) (14 - 47)  SpO2: 99% (21 Jan 2020 11:00) (89% - 100%)      headaches.  Eyes:  No double vision or blurry vision.  Ears:  No ringing in the ears.  Neck:  No neck pain.  Respiratory:  Occasional cough with shortness of breath.  Cardiovascular:  no chest pain.  Abdomen:  occ nausea,  no vomiting, or abdominal pain.  Extremities/Neurological:  No numbness or tingling.  Musculoskeletal:  Occasional joint pain.    PHYSICAL EXAMINATION:   HEENT:  Head:  Normocephalic, atraumatic.  Eyes:  No scleral icterus.  Ears:  Hearing bilaterally appeared to be intact.  NECK:  Supple.  RESPIRATORY:  Decreased breath sounds bilaterally, but most prominent on the right.  CARDIOVASCULAR:  S1 and S2 heard.  ABDOMEN:  Soft and nontender.  Extremities:  No clubbing or cyanosis were noted.      NEUROLOGIC:  The patient is awake and alert.    Extraocular movements were intact.  Pupils were equal, round, and reactive bilaterally 3 mm to 2 mm.  Speech was fluent.  Smile was symmetric.  Motor:  Right upper was 5/5, left upper  arm brace was 3/5 decrease rom hand and finger in flexed position , right lower was 4/5, left lower was 3+/5.  As per my conversation with the spouse, after her apparent event back in March, questionable seizure versus questionable stroke.  She was left with left-sided weakness.  Sensory:  Bilateral upper and lower appeared intact to light touch.              LABS:  CBC Full  -  ( 21 Jan 2020 05:32 )  WBC Count : 10.02 K/uL  RBC Count : 4.20 M/uL  Hemoglobin : 11.7 g/dL  Hematocrit : 38.4 %  Platelet Count - Automated : 495 K/uL  Mean Cell Volume : 91.4 fl  Mean Cell Hemoglobin : 27.9 pg  Mean Cell Hemoglobin Concentration : 30.5 gm/dL  Auto Neutrophil # : 8.10 K/uL  Auto Lymphocyte # : 0.84 K/uL  Auto Monocyte # : 0.84 K/uL  Auto Eosinophil # : 0.15 K/uL  Auto Basophil # : 0.01 K/uL  Auto Neutrophil % : 80.8 %  Auto Lymphocyte % : 8.4 %  Auto Monocyte % : 8.4 %  Auto Eosinophil % : 1.5 %  Auto Basophil % : 0.1 %      01-21    134<L>  |  86<L>  |  17  ----------------------------<  105<H>  4.0   |  44<H>  |  0.51    Ca    9.4      21 Jan 2020 05:32  Phos  2.3     01-21  Mg     2.0     01-21      Hemoglobin A1C:       Vitamin B12         RADIOLOGY    ANALYSIS AND PLAN:  This is a 59-year-old with a history of possibly epilepsy verse TIAs, history of chronic subdural hydromas and change in mental status.    1.	For episode of change in mental status, as per my conversation with the spouse, these appear to occur primarily at the same time at night for the last three to four weeks.  The patient has been on Trileptal for about eight to nine months.  Suspect less likely this is Trileptal, Questionable, the patient could have any type of sleep-related disorder causing these events to occur at night.  I spoke with the spouse, the patient should undergo sleep studies.  2.	For history of chronic subdural hematoma, these appeared to have resolved from previous MRI.  3.	For history of possible underlying epilepsy, for now, I will continue the patient on her Trileptal..  4.	Monitor CO2 levels as needed and respiratory status   5.	spoke outside neurologist Dr. Wallace in past agrees to continue trileptal for now  His telephone number is [phone].  6.	Spouse's name is Marialuisa, his telephone number is [phone] spoke to him at bedside 1/13/2020  7.	steroids as needed   8.	H/O R pigtail placed by thoracic surgery   9.	no new events  10.	seen with daughter today 1/17/2020 seen with mother today   11.	Greater than 16 minutes of time was spent with the patient, plan of care, reviewing data, speaking to the family and multidisciplinary healthcare team

## 2020-01-21 NOTE — PROGRESS NOTE ADULT - PROBLEM SELECTOR PLAN 1
on high flow - vapotherm - am labs reviewed - poss transfer to [facility] when bed available -   chr lung disease - ILD - no improvement - on high flow NC and then BIPAP   BG and labs and imaging reviewed with family this am - discussed GOC and possible transfer to [facility]  prognosis is extremely POOR  pt remains on steroids, and completed -  ABX and R chest tube on this admission   she has undiagnosed chr and progressive lung disease - as per my discussion with her outpatient Pulm MD - Dr. Hazel at [facility] - refused multiple attempts at eval in the past  at present - I am afraid - there are very limited options for Dx and or Therapy  pt remains full code  ICU management

## 2020-01-21 NOTE — PROGRESS NOTE ADULT - PROBLEM SELECTOR PLAN 3
multifactorial  aspiration prec  goc discussions ongoing  for possible transfer to [facility]  care per pulm, cts and icu

## 2020-01-21 NOTE — PROGRESS NOTE ADULT - SUBJECTIVE AND OBJECTIVE BOX
Date/Time Patient Seen:  		  Referring MD:   Data Reviewed	       Patient is a 59y old  Female who presents with a chief complaint of pneumothorax, colitis, UTI (20 Jan 2020 19:46)      Subjective/HPI     PAST MEDICAL & SURGICAL HISTORY:  Interstitial lung disease: on home o2 prn  NHL (non-Hodgkin's lymphoma): Agem 43 sp chemo/rt/stem cell  Transient cerebral ischemia, unspecified type  Mitral prolapse  Pulmonary disease  History of tonsillectomy  History of appendectomy        Medication list         MEDICATIONS  (STANDING):  enoxaparin Injectable 40 milliGRAM(s) SubCutaneous daily  FIRST- Mouthwash  BLM 5 milliLiter(s) Swish and Spit four times a day  hydrocortisone 2.5% Rectal Cream 1 Application(s) Rectal two times a day  hydrocortisone hemorrhoidal Suppository 1 Suppository(s) Rectal two times a day  influenza   Vaccine 0.5 milliLiter(s) IntraMuscular once  lactobacillus acidophilus 1 Tablet(s) Oral three times a day with meals  lidocaine 2% Gel 1 Application(s) Topical three times a day  mesalamine Suppository 1000 milliGRAM(s) Rectal at bedtime  mirtazapine Soltab 15 milliGRAM(s) Oral at bedtime  nystatin    Suspension [id number] Unit(s) Swish and Swallow four times a day  OXcarbazepine 300 milliGRAM(s) Oral two times a day  pantoprazole  Injectable 40 milliGRAM(s) IV Push daily  potassium phosphate / sodium phosphate powder 1 Packet(s) Oral three times a day  potassium phosphate IVPB 15 milliMole(s) IV Intermittent once  predniSONE   Tablet 40 milliGRAM(s) Oral daily  propranolol 20 milliGRAM(s) Oral three times a day  sucralfate 1 Gram(s) Oral two times a day  tiotropium 18 MICROgram(s) Capsule 1 Capsule(s) Inhalation daily  witch hazel Pads 1 Application(s) Topical three times a day  zinc oxide 20% Ointment 1 Application(s) Topical two times a day    MEDICATIONS  (PRN):  acetaminophen   Tablet .. 650 milliGRAM(s) Oral every 6 hours PRN Temp greater or equal to 38C (100.4F), Mild Pain (1 - 3)  ALBUTerol    0.083% 2.5 milliGRAM(s) Nebulizer every 6 hours PRN Shortness of Breath and/or Wheezing  OLANZapine Injectable 2.5 milliGRAM(s) IntraMuscular every 12 hours PRN agitation or anxiety  ondansetron    Tablet 4 milliGRAM(s) Oral every 12 hours PRN Nausea  simethicone 80 milliGRAM(s) Chew two times a day PRN Gas         Vitals log        ICU Vital Signs Last 24 Hrs  T(C): 36.9 (21 Jan 2020 03:23), Max: 37.1 (20 Jan 2020 23:40)  T(F): 98.5 (21 Jan 2020 03:23), Max: 98.8 (20 Jan 2020 23:40)  HR: 89 (21 Jan 2020 06:00) (81 - 121)  BP: 130/78 (21 Jan 2020 06:00) (97/63 - 132/84)  BP(mean): 99 (21 Jan 2020 06:00) (75 - 104)  ABP: --  ABP(mean): --  RR: 29 (21 Jan 2020 06:00) (14 - 50)  SpO2: 96% (21 Jan 2020 06:00) (89% - 99%)           Input and Output:  I&O's Detail    19 Jan 2020 07:01  -  20 Jan 2020 07:00  --------------------------------------------------------  IN:    Oral Fluid: 570 mL  Total IN: 570 mL    OUT:  Total OUT: 0 mL    Total NET: 570 mL      20 Jan 2020 07:01  -  21 Jan 2020 06:53  --------------------------------------------------------  IN:    Oral Fluid: 180 mL  Total IN: 180 mL    OUT:  Total OUT: 0 mL    Total NET: 180 mL          Lab Data                        11.7   10.02 )-----------( 495      ( 21 Jan 2020 05:32 )             38.4     01-21    134<L>  |  86<L>  |  17  ----------------------------<  105<H>  4.0   |  44<H>  |  0.51    Ca    9.4      21 Jan 2020 05:32  Phos  2.3     01-21  Mg     2.0     01-21      ABG - ( 20 Jan 2020 05:04 )  pH, Arterial: 7.36  pH, Blood: x     /  pCO2: 88    /  pO2: 94    / HCO3: 44    / Base Excess: 22.1  /  SaO2: 97                      Review of Systems	      Objective     Physical Examination    heart s1s2  lung dec BS  abd soft  head nc  on high flow -     Pertinent Lab findings & Imaging      Shauna:  NO   Adequate UO     I&O's Detail    19 Jan 2020 07:01  -  20 Jan 2020 07:00  --------------------------------------------------------  IN:    Oral Fluid: 570 mL  Total IN: 570 mL    OUT:  Total OUT: 0 mL    Total NET: 570 mL      20 Jan 2020 07:01  -  21 Jan 2020 06:53  --------------------------------------------------------  IN:    Oral Fluid: 180 mL  Total IN: 180 mL    OUT:  Total OUT: 0 mL    Total NET: 180 mL               Discussed with:     Cultures:	        Radiology

## 2020-01-21 NOTE — PROGRESS NOTE ADULT - SUBJECTIVE AND OBJECTIVE BOX
infectious diseases progress note:    ROMIE VIRAMONTES is a 59y y. o. Female patient    Patient comfortable on NC    Allergies    IV Contrast (Anaphylaxis)  shellfish. (Anaphylaxis)    Intolerances        ANTIBIOTICS/RELEVANT:  antimicrobials  nystatin    Suspension [id number] Unit(s) Swish and Swallow four times a day    immunologic:  influenza   Vaccine 0.5 milliLiter(s) IntraMuscular once    OTHER:  acetaminophen   Tablet .. 650 milliGRAM(s) Oral every 6 hours PRN  ALBUTerol    0.083% 2.5 milliGRAM(s) Nebulizer every 6 hours PRN  enoxaparin Injectable 40 milliGRAM(s) SubCutaneous daily  FIRST- Mouthwash  BLM 5 milliLiter(s) Swish and Spit four times a day  hydrocortisone 2.5% Rectal Cream 1 Application(s) Rectal two times a day  hydrocortisone hemorrhoidal Suppository 1 Suppository(s) Rectal two times a day  lactobacillus acidophilus 1 Tablet(s) Oral three times a day with meals  lidocaine 2% Gel 1 Application(s) Topical three times a day  mesalamine Suppository 1000 milliGRAM(s) Rectal at bedtime  mirtazapine Soltab 15 milliGRAM(s) Oral at bedtime  ondansetron    Tablet 4 milliGRAM(s) Oral every 12 hours PRN  OXcarbazepine 300 milliGRAM(s) Oral two times a day  pantoprazole  Injectable 40 milliGRAM(s) IV Push daily  predniSONE   Tablet 40 milliGRAM(s) Oral daily  propranolol 20 milliGRAM(s) Oral three times a day  simethicone 80 milliGRAM(s) Chew two times a day PRN  sucralfate 1 Gram(s) Oral two times a day  tiotropium 18 MICROgram(s) Capsule 1 Capsule(s) Inhalation daily  witch hazel Pads 1 Application(s) Topical three times a day  zinc oxide 20% Ointment 1 Application(s) Topical two times a day      Objective:  Vital Signs Last 24 Hrs  T(C): 36.6 (21 Jan 2020 07:44), Max: 37.1 (20 Jan 2020 23:40)  T(F): 97.8 (21 Jan 2020 07:44), Max: 98.8 (20 Jan 2020 23:40)  HR: 90 (21 Jan 2020 10:00) (76 - 121)  BP: 136/77 (21 Jan 2020 10:00) (97/63 - 136/77)  BP(mean): 100 (21 Jan 2020 10:00) (75 - 104)  RR: 38 (21 Jan 2020 10:00) (14 - 47)  SpO2: 100% (21 Jan 2020 10:00) (89% - 100%)    T(C): 36.6 (01-21-20 @ 07:44), Max: 37.1 (01-20-20 @ 23:40)  T(C): 36.6 (01-21-20 @ 07:44), Max: 37.1 (01-20-20 @ 23:40)  T(C): 36.6 (01-21-20 @ 07:44), Max: 37.1 (01-20-20 @ 23:40)    PHYSICAL EXAM:  Constitutional: Well-developed, well nourished  Eyes: PERRLA, EOMI  Ear/Nose/Throat: oropharynx normal	  Neck: no JVD, no lymphadenopathy, supple  Respiratory: no accessory muscle use  Cardiovascular: RRR,   Gastrointestinal: soft, NT  Extremities: no clubbing, no cyanosis, edema absent      LABS:                        11.7   10.02 )-----------( 495      ( 21 Jan 2020 05:32 )             38.4       10.02 01-21 @ 05:32  15.59 01-20 @ 05:26  15.29 01-19 @ 05:20  24.28 01-18 @ 08:01  10.64 01-17 @ 05:38  8.36 01-16 @ 05:43  8.54 01-15 @ 05:19      01-21    134<L>  |  86<L>  |  17  ----------------------------<  105<H>  4.0   |  44<H>  |  0.51    Ca    9.4      21 Jan 2020 05:32  Phos  2.3     01-21  Mg     2.0     01-21        Creatinine, Serum: 0.51 mg/dL (01-21-20 @ 05:32)  Creatinine, Serum: 0.44 mg/dL (01-20-20 @ 05:26)  Creatinine, Serum: 0.49 mg/dL (01-19-20 @ 05:20)  Creatinine, Serum: 0.47 mg/dL (01-18-20 @ 04:59)  Creatinine, Serum: 0.41 mg/dL (01-17-20 @ 05:38)  Creatinine, Serum: 0.37 mg/dL (01-16-20 @ 05:43)  Creatinine, Serum: 0.34 mg/dL (01-15-20 @ 05:19)                MICROBIOLOGY:              RADIOLOGY & ADDITIONAL STUDIES:

## 2020-01-21 NOTE — PROGRESS NOTE ADULT - SUBJECTIVE AND OBJECTIVE BOX
INTERVAL HPI/OVERNIGHT EVENTS:  interim events noted  on hi flow  small bm yesterday  no acute gi issues overnight per rn    MEDICATIONS  (STANDING):  enoxaparin Injectable 40 milliGRAM(s) SubCutaneous daily  FIRST- Mouthwash  BLM 5 milliLiter(s) Swish and Spit four times a day  hydrocortisone 2.5% Rectal Cream 1 Application(s) Rectal two times a day  hydrocortisone hemorrhoidal Suppository 1 Suppository(s) Rectal two times a day  influenza   Vaccine 0.5 milliLiter(s) IntraMuscular once  lactobacillus acidophilus 1 Tablet(s) Oral three times a day with meals  lidocaine 2% Gel 1 Application(s) Topical three times a day  mesalamine Suppository 1000 milliGRAM(s) Rectal at bedtime  mirtazapine Soltab 15 milliGRAM(s) Oral at bedtime  nystatin    Suspension [id number] Unit(s) Swish and Swallow four times a day  OXcarbazepine 300 milliGRAM(s) Oral two times a day  pantoprazole  Injectable 40 milliGRAM(s) IV Push daily  potassium phosphate / sodium phosphate powder 1 Packet(s) Oral three times a day  potassium phosphate IVPB 15 milliMole(s) IV Intermittent once  predniSONE   Tablet 40 milliGRAM(s) Oral daily  propranolol 20 milliGRAM(s) Oral three times a day  sucralfate 1 Gram(s) Oral two times a day  tiotropium 18 MICROgram(s) Capsule 1 Capsule(s) Inhalation daily  witch hazel Pads 1 Application(s) Topical three times a day  zinc oxide 20% Ointment 1 Application(s) Topical two times a day    MEDICATIONS  (PRN):  acetaminophen   Tablet .. 650 milliGRAM(s) Oral every 6 hours PRN Temp greater or equal to 38C (100.4F), Mild Pain (1 - 3)  ALBUTerol    0.083% 2.5 milliGRAM(s) Nebulizer every 6 hours PRN Shortness of Breath and/or Wheezing  OLANZapine Injectable 2.5 milliGRAM(s) IntraMuscular every 12 hours PRN agitation or anxiety  ondansetron    Tablet 4 milliGRAM(s) Oral every 12 hours PRN Nausea  simethicone 80 milliGRAM(s) Chew two times a day PRN Gas      Allergies    IV Contrast (Anaphylaxis)  shellfish. (Anaphylaxis)    Intolerances        Review of Systems:    General:  No wt loss, fevers, chills, night sweats, fatigue   Eyes:  Good vision, no reported pain  ENT:  No sore throat, pain, runny nose, dysphagia  CV:  No pain, palpitations, hypo/hypertension  Resp:  No dyspnea, cough, tachypnea, wheezing  GI:  No pain, No nausea, No vomiting, No diarrhea, No constipation, No weight loss, No fever, No pruritis, No rectal bleeding, No melena, No dysphagia  :  No pain, bleeding, incontinence, nocturia  Muscle:  No pain, weakness  Neuro:  No weakness, tingling, memory problems  Psych:  No fatigue, insomnia, mood problems, depression  Endocrine:  No polyuria, polydypsia, cold/heat intolerance  Heme:  No petechiae, ecchymosis, easy bruisability  Skin:  No rash, tattoos, scars, edema      Vital Signs Last 24 Hrs  T(C): 36.6 (21 Jan 2020 07:44), Max: 37.1 (20 Jan 2020 23:40)  T(F): 97.8 (21 Jan 2020 07:44), Max: 98.8 (20 Jan 2020 23:40)  HR: 85 (21 Jan 2020 08:24) (76 - 121)  BP: 135/74 (21 Jan 2020 08:00) (97/63 - 135/74)  BP(mean): 98 (21 Jan 2020 08:00) (75 - 104)  RR: 38 (21 Jan 2020 08:00) (14 - 47)  SpO2: 95% (21 Jan 2020 08:24) (89% - 99%)    PHYSICAL EXAM:    Constitutional: lying in bed   HEENT: ncat  Neck: No LAD  Gastrointestinal: soft nt nd  Extremities: No peripheral edema  Neurological: awake alert      LABS:                        11.7   10.02 )-----------( 495      ( 21 Jan 2020 05:32 )             38.4     01-21    134<L>  |  86<L>  |  17  ----------------------------<  105<H>  4.0   |  44<H>  |  0.51    Ca    9.4      21 Jan 2020 05:32  Phos  2.3     01-21  Mg     2.0     01-21            RADIOLOGY & ADDITIONAL TESTS:

## 2020-01-21 NOTE — PROGRESS NOTE ADULT - ASSESSMENT
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  Hospital course complicated by development of HCAP, acute on chronic hypoxemic respiratory failure requiring high flow NC, and agitation/delirium    1. Neuro: Anxiety/Delirium: improving, dc zyprexa. Continue remeron qhs. Avoid use of benzos due to intolerance. Seizures: continue Trileptal, last level therapeutic on 1/11  2. CVS: continue home propranolol dose, BP remains stable  3. Pulm: Acute on chronic hypoxic resp failure with recent acute hypercarbic respiratory failure. ABG this am shows compensated respiratory acidosis, improving. Off High flow weaned to NC 2L, keep SpO2 above 90. Continue to encourage use of BiPAP 12/5 FiO2 30% qhs. Repeat CXR today shows new small left apical PTX and improvement in right apical PTX.  s/p pulse dose steroids, tapered to 40mg qd. Taper down further. Will check autoimmune markers for potential cause of ILD as CT pattern is not typical of Idiopathic pulm fibrosis. F/u ESR, CRP, SAJI, RF, CCP, SAJI, dsDNA, anti-centromere ab, SCL70 for scleroderma, SSA/SSB for Sjogren's and myomarker panel. Continue Spiriva daily and albuterol nebs prn. Plan discussed with daughter for potential placement of chest tube for pneumothoraces.   4. GI: Will repeat S&S eval to advance diet further if possible from puree to solids. Continue mesalamine and witch hazel for colitis. Continue PPI.  5. Renal: stable kidney function, lytes repleted, will f/u in am  6. ID: s/p course of Zosyn for PNA, completed 1/14. No evidence of active infection currently, observe off systemic antibiotics. Continue Nystatin Mouthwash for oral thrush  7. Heme: continue lovenox for DVT ppx  8. Endo: no active issues  9. Skin: no lines or avendano  10. Dispo: full code, overall poor prognosis given advanced lung disease. Encourage out of bed to chair and cooperation with physical therapy. Pt will need rehab once stable for dc

## 2020-01-21 NOTE — CHART NOTE - NSCHARTNOTEFT_GEN_A_CORE
Assessment: Pt seen in ICU for malnutrition follow-up. 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  Hospital course complicated by development of HCAP, acute on chronic hypoxemic respiratory failure requiring high flow NC, and agitation/delirium. Per chart with possible transfer to [facility] when bed is available. Very poor prognosis noted. Spoke to pt's family member present at bedside, reports that pt continues with little PO intake, states she was able to get the pt to eat some of eggs for breakfast this morning. Pt's family member in agreement to try oral nutritional supplement of Magic cup as pt dislikes all other nutritional supplements. NO GI distress noted at this time, noted, last BM 1/20.     Factors impacting intake: [ x] swallowing issues  [x ] other: persistent lack of appetite     Diet Presciption: Diet, Dysphagia 1 Pureed-Nectar Consistency Fluid (01-16-20 @ 10:06)    Intake: poor     Current Weight: no updated weight in chart  Previous Weight: (1/18) 121.2lbs  % Weight Change- recommend to obtain pt's updated body weight    Pertinent Medications: MEDICATIONS  (STANDING):  enoxaparin Injectable 40 milliGRAM(s) SubCutaneous daily  FIRST- Mouthwash  BLM 5 milliLiter(s) Swish and Spit four times a day  hydrocortisone 2.5% Rectal Cream 1 Application(s) Rectal two times a day  hydrocortisone hemorrhoidal Suppository 1 Suppository(s) Rectal two times a day  influenza   Vaccine 0.5 milliLiter(s) IntraMuscular once  lactobacillus acidophilus 1 Tablet(s) Oral three times a day with meals  lidocaine 2% Gel 1 Application(s) Topical three times a day  mesalamine Suppository 1000 milliGRAM(s) Rectal at bedtime  mirtazapine Soltab 15 milliGRAM(s) Oral at bedtime  nystatin    Suspension [id number] Unit(s) Swish and Swallow four times a day  OXcarbazepine 300 milliGRAM(s) Oral two times a day  pantoprazole  Injectable 40 milliGRAM(s) IV Push daily  potassium phosphate / sodium phosphate powder 1 Packet(s) Oral three times a day  potassium phosphate IVPB 15 milliMole(s) IV Intermittent once  predniSONE   Tablet 40 milliGRAM(s) Oral daily  propranolol 20 milliGRAM(s) Oral three times a day  sucralfate 1 Gram(s) Oral two times a day  tiotropium 18 MICROgram(s) Capsule 1 Capsule(s) Inhalation daily  witch hazel Pads 1 Application(s) Topical three times a day  zinc oxide 20% Ointment 1 Application(s) Topical two times a day    MEDICATIONS  (PRN):  acetaminophen   Tablet .. 650 milliGRAM(s) Oral every 6 hours PRN Temp greater or equal to 38C (100.4F), Mild Pain (1 - 3)  ALBUTerol    0.083% 2.5 milliGRAM(s) Nebulizer every 6 hours PRN Shortness of Breath and/or Wheezing  OLANZapine Injectable 2.5 milliGRAM(s) IntraMuscular every 12 hours PRN agitation or anxiety  ondansetron    Tablet 4 milliGRAM(s) Oral every 12 hours PRN Nausea  simethicone 80 milliGRAM(s) Chew two times a day PRN Gas    Pertinent Labs: 01-21 Na134 mmol/L<L> Glu 105 mg/dL<H> K+ 4.0 mmol/L Cr  0.51 mg/dL BUN 17 mg/dL 01-21 Phos 2.3 mg/dL<L>, Chloride 85, 01-17 Alb 2.6 g/dL<L>     CAPILLARY BLOOD GLUCOSE    Skin: Stage 2 sacrum and left gluteal pressure injuries   +1 dependent edema    Estimated Needs:   [x ] no change since previous assessment  [ ] recalculated:     Previous Nutrition Diagnosis:    [x ] Malnutrition     Nutrition Diagnosis is [x ] ongoing-being addressed with offer of multiple oral nutritional supplements, attempting to obtain/ provide pt with food preferences     New Nutrition Diagnosis: [x ] not applicable       Interventions:   Recommend  [ x] Change Diet To: Continue with current diet of Dysphagia 1 puree with nectar thick liquids as per SLP recommendation   [x ] Nutrition Supplement: Recommend trial of Magic cup BID   [ ] Nutrition Support  [x ] Other:  1) Recommend to obtain pt's updated body weight and to continue obtaining daily body weights to monitor for weight loss  2) Recommend MVI, Vitamin C  3)  Suggest palliative care eval to discuss GOC, ie tube feeding if po intake remains poor.  4) Monitor pt's PO intake, weight, skin, edema, GI distress   5) RD to remain available     Monitoring and Evaluation:   [x ] PO intake [ x ] Tolerance to diet prescription [ x ] weights [ x ] labs[ x ] follow up per protocol  [ ] other:

## 2020-01-21 NOTE — PROGRESS NOTE ADULT - SUBJECTIVE AND OBJECTIVE BOX
Patient is a 59y old  Female who presents with a chief complaint of pneumothorax, colitis, UTI (21 Jan 2020 12:51)    24 hour events: No acute overnight events. Pt transitioned from High flow to vapotherm yesterday. Awaiting callback from [facility] for transfer as requested by family.      REVIEW OF SYSTEMS  unable to obtain accurate ROS 2/2 patients condition         T(F): 98.2 (01-21-20 @ 11:29), Max: 98.8 (01-20-20 @ 23:40)  HR: 90 (01-21-20 @ 11:00) (76 - 121)  BP: 147/97 (01-21-20 @ 11:00) (97/63 - 147/97)  RR: 43 (01-21-20 @ 11:00) (14 - 47)  SpO2: 99% (01-21-20 @ 11:00) (89% - 100%)  Wt(kg): --            I&O's Summary    01-20 @ 07:01  -  01-21 @ 07:00  --------------------------------------------------------  IN: 430 mL / OUT: 0 mL / NET: 430 mL    01-21 @ 07:01  -  01-21 @ 13:49  --------------------------------------------------------  IN: 100 mL / OUT: 0 mL / NET: 100 mL      PHYSICAL EXAM  General: Chronically ill appearing cachectic female lying in bed, NAD  Neck: No JVD, trachea midline. + HFNC (vapotherm)  Respiratory: decreased BS R lung fields, mild crackles L upper lobe  Cardiovascular: S1S2+ RRR   Gastrointestinal: Soft, NT, ND, +BS  Extremities: No peripheral edema, No cyanosis, clubbing.  Neurological: Awake, lethargic, oriented x2-3  Skin: warm, well perfused    MEDICATIONS  nystatin    Suspension Swish and Swallow    propranolol Oral    predniSONE   Tablet Oral    ALBUTerol    0.083% Nebulizer PRN  tiotropium 18 MICROgram(s) Capsule Inhalation    acetaminophen   Tablet .. Oral PRN  mirtazapine Soltab Oral  ondansetron    Tablet Oral PRN  OXcarbazepine Oral      enoxaparin Injectable SubCutaneous    mesalamine Suppository Rectal  pantoprazole  Injectable IV Push  simethicone Chew PRN  sucralfate Oral        influenza   Vaccine IntraMuscular    FIRST- Mouthwash  BLM Swish and Spit  hydrocortisone 2.5% Rectal Cream Rectal  hydrocortisone hemorrhoidal Suppository Rectal  lidocaine 2% Gel Topical  witch hazel Pads Topical  zinc oxide 20% Ointment Topical    lactobacillus acidophilus Oral                          11.7   10.02 )-----------( 495      ( 21 Jan 2020 05:32 )             38.4       01-21    134<L>  |  86<L>  |  17  ----------------------------<  105<H>  4.0   |  44<H>  |  0.51    Ca    9.4      21 Jan 2020 05:32  Phos  2.3     01-21  Mg     2.0     01-21                          CENTRAL LINE: N  LUKE: N  A-LINE: N    GLOBAL ISSUE/BEST PRACTICE  Analgesia: N  Sedation: N  CAM-ICU: Neg  HOB elevation: yes  Stress ulcer prophylaxis: Y  VTE prophylaxis: Y  Glycemic control: N  Nutrition: Y    CODE STATUS: Full code

## 2020-01-21 NOTE — PROGRESS NOTE ADULT - SUBJECTIVE AND OBJECTIVE BOX
CHIEF COMPLAINT/INTERVAL HISTORY:  Pt. seen and evaluated for acute hypoxic and hypercapnic respiratory failure.  Pt. is awake and responsive.  States she is "hanging in there."  Tolerating prednisone.      REVIEW OF SYSTEMS:  No fever, CP, or abdominal pain.     Vital Signs Last 24 Hrs  T(C): 36.6 (21 Jan 2020 07:44), Max: 37.1 (20 Jan 2020 23:40)  T(F): 97.8 (21 Jan 2020 07:44), Max: 98.8 (20 Jan 2020 23:40)  HR: 85 (21 Jan 2020 08:24) (76 - 121)  BP: 135/74 (21 Jan 2020 08:00) (97/63 - 135/74)  BP(mean): 98 (21 Jan 2020 08:00) (75 - 104)  RR: 38 (21 Jan 2020 08:00) (14 - 47)  SpO2: 95% (21 Jan 2020 08:24) (89% - 99%)    PHYSICAL EXAM:  GENERAL: NAD, awake and responsive  HEENT: EOMI, hearing normal, conjunctiva and sclera clear  Chest: Diminished BS bilaterally, no wheezing  CV: S1S2, RRR,   GI: soft, +BS, NT/ND  Musculoskeletal: no edema, contracture of left wrist  Psychiatric: affect nL, mood nL  Skin: warm and dry    LABS:                        11.7   10.02 )-----------( 495      ( 21 Jan 2020 05:32 )             38.4     01-21    134<L>  |  86<L>  |  17  ----------------------------<  105<H>  4.0   |  44<H>  |  0.51    Ca    9.4      21 Jan 2020 05:32  Phos  2.3     01-21  Mg     2.0     01-21      Assessment and Plan:  -Septic shock and acute hypoxic and hypercapnic respiratory failure 2/2 hospital acquired pneumonia, right pneumothorax, and ILD:  Shock has resolved.  Off vasopressors.  Completed course of IV antibiotics.  s/p removal of chest tube on 1/16.  Continue Prednisone 40mg PO daily, Spiriva inh daily, Albuterol Neb Q6h PRN, and O2 support.  Awaiting possible transfer to [facility].  ID, Pulmonary, and Intensivist f/u  -Colitis:  Completed course of IV antibiotics.  Continue mesalamine 1000mg CT QHS.  GI f/u  -Nausea and GERD:  Zofran 4mg PO Q12h PRN, Simethicone 80mg PO BID PRN, Carafate 1gm PO BID, and Protonix 40mg IV daily.   -Seizure disorder:  continue Trileptal 300mg PO BID.  Neurology f/u  -Thrush:  continue nystatin swish and swallow four times a day  -VTE ppx: Lovenox 40mg SQ daily

## 2020-01-22 LAB
ANION GAP SERPL CALC-SCNC: <4 MMOL/L — LOW (ref 5–17)
BASE EXCESS BLDA CALC-SCNC: 19.5 MMOL/L — HIGH (ref -2–2)
BASE EXCESS BLDA CALC-SCNC: 24.5 MMOL/L — HIGH (ref -2–2)
BLOOD GAS COMMENTS ARTERIAL: SIGNIFICANT CHANGE UP
BUN SERPL-MCNC: 19 MG/DL — SIGNIFICANT CHANGE UP (ref 7–23)
CALCIUM SERPL-MCNC: 9.4 MG/DL — SIGNIFICANT CHANGE UP (ref 8.5–10.1)
CENTROMERE AB SER-ACNC: <0.2 AI — SIGNIFICANT CHANGE UP
CHLORIDE SERPL-SCNC: 88 MMOL/L — LOW (ref 96–108)
CO2 SERPL-SCNC: >45 MMOL/L — CRITICAL HIGH (ref 22–31)
CREAT SERPL-MCNC: 0.39 MG/DL — LOW (ref 0.5–1.3)
DSDNA AB FLD-ACNC: <0.2 AI — SIGNIFICANT CHANGE UP
ENA SCL70 AB SER-ACNC: <0.2 AI — SIGNIFICANT CHANGE UP
ENA SS-A AB FLD IA-ACNC: <0.2 AI — SIGNIFICANT CHANGE UP
GLUCOSE SERPL-MCNC: 135 MG/DL — HIGH (ref 70–99)
HCO3 BLDA-SCNC: 12 MMOL/L — LOW (ref 23–27)
HCO3 BLDA-SCNC: 41 MMOL/L — HIGH (ref 23–27)
HCO3 BLDA-SCNC: 46 MMOL/L — HIGH (ref 23–27)
HCT VFR BLD CALC: 37.3 % — SIGNIFICANT CHANGE UP (ref 34.5–45)
HGB BLD-MCNC: 11.3 G/DL — LOW (ref 11.5–15.5)
HOROWITZ INDEX BLDA+IHG-RTO: 28 — SIGNIFICANT CHANGE UP
HOROWITZ INDEX BLDA+IHG-RTO: 30 — SIGNIFICANT CHANGE UP
HOROWITZ INDEX BLDA+IHG-RTO: 35 — SIGNIFICANT CHANGE UP
MAGNESIUM SERPL-MCNC: 2.1 MG/DL — SIGNIFICANT CHANGE UP (ref 1.6–2.6)
MCHC RBC-ENTMCNC: 28.2 PG — SIGNIFICANT CHANGE UP (ref 27–34)
MCHC RBC-ENTMCNC: 30.3 GM/DL — LOW (ref 32–36)
MCV RBC AUTO: 93 FL — SIGNIFICANT CHANGE UP (ref 80–100)
NRBC # BLD: 0 /100 WBCS — SIGNIFICANT CHANGE UP (ref 0–0)
PCO2 BLDA: 82 MMHG — CRITICAL HIGH (ref 32–46)
PCO2 BLDA: 99 MMHG — CRITICAL HIGH (ref 32–46)
PCO2 BLDA: >103 MMHG — CRITICAL HIGH (ref 32–46)
PH BLDA: 7.19 — CRITICAL LOW (ref 7.35–7.45)
PH BLDA: 7.34 — LOW (ref 7.35–7.45)
PH BLDA: 7.36 — SIGNIFICANT CHANGE UP (ref 7.35–7.45)
PHOSPHATE SERPL-MCNC: 3.4 MG/DL — SIGNIFICANT CHANGE UP (ref 2.5–4.5)
PLATELET # BLD AUTO: 505 K/UL — HIGH (ref 150–400)
PO2 BLDA: 58 MMHG — LOW (ref 74–108)
PO2 BLDA: 58 MMHG — LOW (ref 74–108)
PO2 BLDA: 70 MMHG — LOW (ref 74–108)
POTASSIUM SERPL-MCNC: 3.5 MMOL/L — SIGNIFICANT CHANGE UP (ref 3.5–5.3)
POTASSIUM SERPL-SCNC: 3.5 MMOL/L — SIGNIFICANT CHANGE UP (ref 3.5–5.3)
RBC # BLD: 4.01 M/UL — SIGNIFICANT CHANGE UP (ref 3.8–5.2)
RBC # FLD: 14.3 % — SIGNIFICANT CHANGE UP (ref 10.3–14.5)
SAO2 % BLDA: 84 % — LOW (ref 92–96)
SAO2 % BLDA: 90 % — LOW (ref 92–96)
SAO2 % BLDA: 92 % — SIGNIFICANT CHANGE UP (ref 92–96)
SODIUM SERPL-SCNC: 137 MMOL/L — SIGNIFICANT CHANGE UP (ref 135–145)
WBC # BLD: 15.02 K/UL — HIGH (ref 3.8–10.5)
WBC # FLD AUTO: 15.02 K/UL — HIGH (ref 3.8–10.5)

## 2020-01-22 PROCEDURE — 99291 CRITICAL CARE FIRST HOUR: CPT

## 2020-01-22 PROCEDURE — 71045 X-RAY EXAM CHEST 1 VIEW: CPT | Mod: 26

## 2020-01-22 RX ORDER — ALPRAZOLAM 0.25 MG
0.12 TABLET ORAL EVERY 8 HOURS
Refills: 0 | Status: DISCONTINUED | OUTPATIENT
Start: 2020-01-22 | End: 2020-01-24

## 2020-01-22 RX ORDER — FUROSEMIDE 40 MG
20 TABLET ORAL ONCE
Refills: 0 | Status: COMPLETED | OUTPATIENT
Start: 2020-01-22 | End: 2020-01-22

## 2020-01-22 RX ORDER — ALPRAZOLAM 0.25 MG
0.12 TABLET ORAL
Refills: 0 | Status: DISCONTINUED | OUTPATIENT
Start: 2020-01-22 | End: 2020-01-23

## 2020-01-22 RX ORDER — ALPRAZOLAM 0.25 MG
0.12 TABLET ORAL ONCE
Refills: 0 | Status: DISCONTINUED | OUTPATIENT
Start: 2020-01-22 | End: 2020-01-22

## 2020-01-22 RX ADMIN — PANTOPRAZOLE SODIUM 40 MILLIGRAM(S): 20 TABLET, DELAYED RELEASE ORAL at 13:16

## 2020-01-22 RX ADMIN — Medication 500000 UNIT(S): at 13:12

## 2020-01-22 RX ADMIN — DIPHENHYDRAMINE HYDROCHLORIDE AND LIDOCAINE HYDROCHLORIDE AND ALUMINUM HYDROXIDE AND MAGNESIUM HYDRO 5 MILLILITER(S): KIT at 00:03

## 2020-01-22 RX ADMIN — ZINC OXIDE 1 APPLICATION(S): 200 OINTMENT TOPICAL at 17:07

## 2020-01-22 RX ADMIN — AER TRAVELER 1 APPLICATION(S): 0.5 SOLUTION RECTAL; TOPICAL at 13:13

## 2020-01-22 RX ADMIN — Medication 500000 UNIT(S): at 17:05

## 2020-01-22 RX ADMIN — Medication 1 SUPPOSITORY(S): at 05:41

## 2020-01-22 RX ADMIN — ZINC OXIDE 1 APPLICATION(S): 200 OINTMENT TOPICAL at 05:43

## 2020-01-22 RX ADMIN — MIRTAZAPINE 15 MILLIGRAM(S): 45 TABLET, ORALLY DISINTEGRATING ORAL at 22:07

## 2020-01-22 RX ADMIN — Medication 1 GRAM(S): at 17:05

## 2020-01-22 RX ADMIN — OXCARBAZEPINE 300 MILLIGRAM(S): 300 TABLET, FILM COATED ORAL at 10:04

## 2020-01-22 RX ADMIN — AER TRAVELER 1 APPLICATION(S): 0.5 SOLUTION RECTAL; TOPICAL at 05:43

## 2020-01-22 RX ADMIN — Medication 1 TABLET(S): at 17:05

## 2020-01-22 RX ADMIN — ENOXAPARIN SODIUM 40 MILLIGRAM(S): 100 INJECTION SUBCUTANEOUS at 13:15

## 2020-01-22 RX ADMIN — Medication 1 APPLICATION(S): at 00:04

## 2020-01-22 RX ADMIN — DIPHENHYDRAMINE HYDROCHLORIDE AND LIDOCAINE HYDROCHLORIDE AND ALUMINUM HYDROXIDE AND MAGNESIUM HYDRO 5 MILLILITER(S): KIT at 13:13

## 2020-01-22 RX ADMIN — MIRTAZAPINE 15 MILLIGRAM(S): 45 TABLET, ORALLY DISINTEGRATING ORAL at 00:02

## 2020-01-22 RX ADMIN — LIDOCAINE 1 APPLICATION(S): 4 CREAM TOPICAL at 00:03

## 2020-01-22 RX ADMIN — Medication 1 APPLICATION(S): at 22:09

## 2020-01-22 RX ADMIN — Medication 1000 MILLIGRAM(S): at 22:08

## 2020-01-22 RX ADMIN — SIMETHICONE 80 MILLIGRAM(S): 80 TABLET, CHEWABLE ORAL at 17:05

## 2020-01-22 RX ADMIN — LIDOCAINE 1 APPLICATION(S): 4 CREAM TOPICAL at 05:41

## 2020-01-22 RX ADMIN — Medication 1 TABLET(S): at 13:13

## 2020-01-22 RX ADMIN — ALBUTEROL 2.5 MILLIGRAM(S): 90 AEROSOL, METERED ORAL at 08:23

## 2020-01-22 RX ADMIN — AER TRAVELER 1 APPLICATION(S): 0.5 SOLUTION RECTAL; TOPICAL at 00:03

## 2020-01-22 RX ADMIN — LIDOCAINE 1 APPLICATION(S): 4 CREAM TOPICAL at 22:08

## 2020-01-22 RX ADMIN — Medication 1 APPLICATION(S): at 10:04

## 2020-01-22 RX ADMIN — AER TRAVELER 1 APPLICATION(S): 0.5 SOLUTION RECTAL; TOPICAL at 22:52

## 2020-01-22 RX ADMIN — Medication 1000 MILLIGRAM(S): at 00:04

## 2020-01-22 RX ADMIN — Medication 0.12 MILLIGRAM(S): at 10:00

## 2020-01-22 RX ADMIN — LIDOCAINE 1 APPLICATION(S): 4 CREAM TOPICAL at 13:14

## 2020-01-22 RX ADMIN — Medication 20 MILLIGRAM(S): at 14:35

## 2020-01-22 RX ADMIN — OXCARBAZEPINE 300 MILLIGRAM(S): 300 TABLET, FILM COATED ORAL at 22:07

## 2020-01-22 RX ADMIN — Medication 1 SUPPOSITORY(S): at 17:05

## 2020-01-22 RX ADMIN — DIPHENHYDRAMINE HYDROCHLORIDE AND LIDOCAINE HYDROCHLORIDE AND ALUMINUM HYDROXIDE AND MAGNESIUM HYDRO 5 MILLILITER(S): KIT at 17:07

## 2020-01-22 RX ADMIN — Medication 500000 UNIT(S): at 00:01

## 2020-01-22 NOTE — PROGRESS NOTE ADULT - PROBLEM SELECTOR PLAN 1
pt remains anxious with periods of increased resp rate and anxiety, as discussed with ICU staff suggest this is a combination of mental health issues along with pulmonary disease no no clear evidence that his si being driven by an acute bacterial process that would benefit from abx.

## 2020-01-22 NOTE — PROGRESS NOTE ADULT - SUBJECTIVE AND OBJECTIVE BOX
Patient is a 59y old  Female who presents with a chief complaint of pneumothorax, colitis, UTI (22 Jan 2020 10:11)    24 hour events: Overnight pt agitated, refused Bipap    This am pt seen and examined at bedside, not arousable, lethargic. Pt unresponsive to sternal rub, increased WOB, IC retractions noted. ABG ordered which showed Respiratory Acidosis with PCO2 of >103, pt placed on bipap. Repeat ABG at noon improved with PCO2 82. Pt agitated while on mask and given xanax 0.125mg x1 which she responded well to.        REVIEW OF SYSTEMS  unable to obtain 2/2 mental status     T(F): 97.3 (01-22-20 @ 12:00), Max: 98.2 (01-21-20 @ 20:37)  HR: 103 (01-22-20 @ 11:58) (73 - 104)  BP: 128/82 (01-22-20 @ 11:00) (102/67 - 172/81)  RR: 29 (01-22-20 @ 11:00) (23 - 59)  SpO2: 92% (01-22-20 @ 11:58) (92% - 100%)  Wt(kg): --            I&O's Summary    01-21 @ 07:01  -  01-22 @ 07:00  --------------------------------------------------------  IN: 420 mL / OUT: 0 mL / NET: 420 mL      PHYSICAL EXAM  General: lethargic cachectic chronically ill appearing female  CNS:  lethargic, unarousable, does not respond to verbal or painful stimuli  HEENT: PERRLA, NC/AT  Resp: crackles b/l lung fields with exp wheezing b/l lower lobes  CVS: RRR, no murmur  Abd: soft, NT, ND +BS  Ext: no lower ext edema b/k  Skin: warm, well perfused    MEDICATIONS  nystatin    Suspension Swish and Swallow    propranolol Oral    predniSONE   Tablet Oral    ALBUTerol    0.083% Nebulizer PRN  tiotropium 18 MICROgram(s) Capsule Inhalation    acetaminophen   Tablet .. Oral PRN  mirtazapine Soltab Oral  ondansetron    Tablet Oral PRN  OXcarbazepine Oral      enoxaparin Injectable SubCutaneous    mesalamine Suppository Rectal  pantoprazole  Injectable IV Push  simethicone Chew PRN  sucralfate Oral        influenza   Vaccine IntraMuscular    FIRST- Mouthwash  BLM Swish and Spit  hydrocortisone 2.5% Rectal Cream Rectal  hydrocortisone hemorrhoidal Suppository Rectal  lidocaine 2% Gel Topical  witch hazel Pads Topical  zinc oxide 20% Ointment Topical    lactobacillus acidophilus Oral                          11.3   15.02 )-----------( 505      ( 22 Jan 2020 05:25 )             37.3       01-22    137  |  88<L>  |  19  ----------------------------<  135<H>  3.5   |  >45<HH>  |  0.39<L>    Ca    9.4      22 Jan 2020 05:25  Phos  3.4     01-22  Mg     2.1     01-22                      CENTRAL LINE: N  LUKE: N  A-LINE: N    GLOBAL ISSUE/BEST PRACTICE  Analgesia: N  Sedation: N  CAM-ICU: Neg  HOB elevation: yes  Stress ulcer prophylaxis: Y  VTE prophylaxis: Y  Glycemic control: N  Nutrition: Y    CODE STATUS: Full code  GOC Discussion: Y

## 2020-01-22 NOTE — PROGRESS NOTE ADULT - SUBJECTIVE AND OBJECTIVE BOX
Date/Time Patient Seen:  		  Referring MD:   Data Reviewed	       Patient is a 59y old  Female who presents with a chief complaint of pneumothorax, colitis, UTI (21 Jan 2020 13:48)      Subjective/HPI     PAST MEDICAL & SURGICAL HISTORY:  Interstitial lung disease: on home o2 prn  NHL (non-Hodgkin's lymphoma): Agem 43 sp chemo/rt/stem cell  Transient cerebral ischemia, unspecified type  Mitral prolapse  Pulmonary disease  History of tonsillectomy  History of appendectomy        Medication list         MEDICATIONS  (STANDING):  enoxaparin Injectable 40 milliGRAM(s) SubCutaneous daily  FIRST- Mouthwash  BLM 5 milliLiter(s) Swish and Spit four times a day  hydrocortisone 2.5% Rectal Cream 1 Application(s) Rectal two times a day  hydrocortisone hemorrhoidal Suppository 1 Suppository(s) Rectal two times a day  influenza   Vaccine 0.5 milliLiter(s) IntraMuscular once  lactobacillus acidophilus 1 Tablet(s) Oral three times a day with meals  lidocaine 2% Gel 1 Application(s) Topical three times a day  mesalamine Suppository 1000 milliGRAM(s) Rectal at bedtime  mirtazapine Soltab 15 milliGRAM(s) Oral at bedtime  nystatin    Suspension 259375 Unit(s) Swish and Swallow four times a day  OXcarbazepine 300 milliGRAM(s) Oral two times a day  pantoprazole  Injectable 40 milliGRAM(s) IV Push daily  predniSONE   Tablet 40 milliGRAM(s) Oral daily  propranolol 20 milliGRAM(s) Oral three times a day  sucralfate 1 Gram(s) Oral two times a day  tiotropium 18 MICROgram(s) Capsule 1 Capsule(s) Inhalation daily  witch hazel Pads 1 Application(s) Topical three times a day  zinc oxide 20% Ointment 1 Application(s) Topical two times a day    MEDICATIONS  (PRN):  acetaminophen   Tablet .. 650 milliGRAM(s) Oral every 6 hours PRN Temp greater or equal to 38C (100.4F), Mild Pain (1 - 3)  ALBUTerol    0.083% 2.5 milliGRAM(s) Nebulizer every 6 hours PRN Shortness of Breath and/or Wheezing  ondansetron    Tablet 4 milliGRAM(s) Oral every 12 hours PRN Nausea  simethicone 80 milliGRAM(s) Chew two times a day PRN Gas         Vitals log        ICU Vital Signs Last 24 Hrs  T(C): 36.3 (22 Jan 2020 04:37), Max: 36.8 (21 Jan 2020 11:29)  T(F): 97.4 (22 Jan 2020 04:37), Max: 98.2 (21 Jan 2020 11:29)  HR: 76 (22 Jan 2020 08:00) (73 - 104)  BP: 108/63 (22 Jan 2020 08:00) (102/67 - 170/99)  BP(mean): 81 (22 Jan 2020 08:00) (78 - 128)  ABP: --  ABP(mean): --  RR: 28 (22 Jan 2020 08:00) (23 - 59)  SpO2: 100% (22 Jan 2020 08:00) (92% - 100%)           Input and Output:  I&O's Detail    21 Jan 2020 07:01  -  22 Jan 2020 07:00  --------------------------------------------------------  IN:    Oral Fluid: 420 mL  Total IN: 420 mL    OUT:  Total OUT: 0 mL    Total NET: 420 mL          Lab Data                        11.3   15.02 )-----------( 505      ( 22 Jan 2020 05:25 )             37.3     01-22    137  |  88<L>  |  19  ----------------------------<  135<H>  3.5   |  >45<HH>  |  0.39<L>    Ca    9.4      22 Jan 2020 05:25  Phos  3.4     01-22  Mg     2.1     01-22      ABG - ( 21 Jan 2020 08:52 )  pH, Arterial: 7.36  pH, Blood: x     /  pCO2: 83    /  pO2: 61    / HCO3: 41    / Base Excess: 19.4  /  SaO2: 90                      Review of Systems	      Objective     Physical Examination    heart s1s2  lung dec BS  abd soft  head nc      Pertinent Lab findings & Imaging      Shauna:  NO   Adequate UO     I&O's Detail    21 Jan 2020 07:01  -  22 Jan 2020 07:00  --------------------------------------------------------  IN:    Oral Fluid: 420 mL  Total IN: 420 mL    OUT:  Total OUT: 0 mL    Total NET: 420 mL               Discussed with:     Cultures:	        Radiology

## 2020-01-22 NOTE — PROGRESS NOTE ADULT - SUBJECTIVE AND OBJECTIVE BOX
infectious diseases progress note:    ROMIE VIRAMONTES is a 59y y. o. Female patient    Patient asking "I need help, I can't handle this"    Allergies    IV Contrast (Anaphylaxis)  shellfish. (Anaphylaxis)    Intolerances        ANTIBIOTICS/RELEVANT:  antimicrobials  nystatin    Suspension [id number] Unit(s) Swish and Swallow four times a day    immunologic:  influenza   Vaccine 0.5 milliLiter(s) IntraMuscular once    OTHER:  acetaminophen   Tablet .. 650 milliGRAM(s) Oral every 6 hours PRN  ALBUTerol    0.083% 2.5 milliGRAM(s) Nebulizer every 6 hours PRN  enoxaparin Injectable 40 milliGRAM(s) SubCutaneous daily  FIRST- Mouthwash  BLM 5 milliLiter(s) Swish and Spit four times a day  hydrocortisone 2.5% Rectal Cream 1 Application(s) Rectal two times a day  hydrocortisone hemorrhoidal Suppository 1 Suppository(s) Rectal two times a day  lactobacillus acidophilus 1 Tablet(s) Oral three times a day with meals  lidocaine 2% Gel 1 Application(s) Topical three times a day  mesalamine Suppository 1000 milliGRAM(s) Rectal at bedtime  mirtazapine Soltab 15 milliGRAM(s) Oral at bedtime  ondansetron    Tablet 4 milliGRAM(s) Oral every 12 hours PRN  OXcarbazepine 300 milliGRAM(s) Oral two times a day  pantoprazole  Injectable 40 milliGRAM(s) IV Push daily  predniSONE   Tablet 40 milliGRAM(s) Oral daily  propranolol 20 milliGRAM(s) Oral three times a day  simethicone 80 milliGRAM(s) Chew two times a day PRN  sucralfate 1 Gram(s) Oral two times a day  tiotropium 18 MICROgram(s) Capsule 1 Capsule(s) Inhalation daily  witch hazel Pads 1 Application(s) Topical three times a day  zinc oxide 20% Ointment 1 Application(s) Topical two times a day      Objective:  Vital Signs Last 24 Hrs  T(C): 36.3 (22 Jan 2020 04:37), Max: 36.8 (21 Jan 2020 11:29)  T(F): 97.4 (22 Jan 2020 04:37), Max: 98.2 (21 Jan 2020 11:29)  HR: 96 (22 Jan 2020 09:00) (73 - 104)  BP: 172/81 (22 Jan 2020 09:00) (102/67 - 172/81)  BP(mean): 116 (22 Jan 2020 09:00) (78 - 128)  RR: 28 (22 Jan 2020 08:00) (23 - 59)  SpO2: 92% (22 Jan 2020 09:00) (92% - 100%)    T(C): 36.3 (01-22-20 @ 04:37), Max: 37.1 (01-20-20 @ 23:40)  T(C): 36.3 (01-22-20 @ 04:37), Max: 37.1 (01-20-20 @ 23:40)  T(C): 36.3 (01-22-20 @ 04:37), Max: 37.1 (01-20-20 @ 23:40)    PHYSICAL EXAM:  Constitutional: Well-developed, well nourished, anxious  Eyes: PERRLA, EOMI  Ear/Nose/Throat: on oygen	  Neck: no JVD, no lymphadenopathy, supple  Respiratory: no accessory muscle use  Cardiovascular: RRR,   Gastrointestinal: soft, NT  Extremities: no clubbing, no cyanosis, edema absent      LABS:                        11.3   15.02 )-----------( 505      ( 22 Jan 2020 05:25 )             37.3       15.02 01-22 @ 05:25  10.02 01-21 @ 05:32  15.59 01-20 @ 05:26  15.29 01-19 @ 05:20  24.28 01-18 @ 08:01  10.64 01-17 @ 05:38  8.36 01-16 @ 05:43      01-22    137  |  88<L>  |  19  ----------------------------<  135<H>  3.5   |  >45<HH>  |  0.39<L>    Ca    9.4      22 Jan 2020 05:25  Phos  3.4     01-22  Mg     2.1     01-22        Creatinine, Serum: 0.39 mg/dL (01-22-20 @ 05:25)  Creatinine, Serum: 0.51 mg/dL (01-21-20 @ 05:32)  Creatinine, Serum: 0.44 mg/dL (01-20-20 @ 05:26)  Creatinine, Serum: 0.49 mg/dL (01-19-20 @ 05:20)  Creatinine, Serum: 0.47 mg/dL (01-18-20 @ 04:59)  Creatinine, Serum: 0.41 mg/dL (01-17-20 @ 05:38)  Creatinine, Serum: 0.37 mg/dL (01-16-20 @ 05:43)    Blood Gas Profile - Arterial (01.22.20 @ 08:17)    pH, Arterial: 7.19    pCO2, Arterial: >103 mmHg    pO2, Arterial: 58 mmHg    HCO3, Arterial: 12 mmol/L    Oxygen Saturation, Arterial: 84 %    FIO2, Arterial: 28.0    Blood Gas Comments Arterial: from LR on 3ln/c +A test        MICROBIOLOGY:              RADIOLOGY & ADDITIONAL STUDIES:

## 2020-01-22 NOTE — PROGRESS NOTE ADULT - ASSESSMENT
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  Hospital course complicated by development of HCAP, acute on chronic hypoxemic respiratory failure requiring high flow NC, and agitation/delirium    1. Neuro: Anxiety/Delirium: continued symptoms overnight, Continue remeron qhs. Will add xanax 0.125mg bid with prn doses q8. Avoid use of ativan due to intolerance. Seizures: continue Trileptal, last level therapeutic on 1/11.  2. CVS: HD stable, continue home propranolol dose  3. Pulm: Acute hypercarbic respiratory failure: ABG this am consistent with respiratory acidosis, PCO2 >103. Pt refusing bipap overnight. Placed on Bipap with improvement in mental status and ABG. Keep on NC 2L during the day, keep SpO2 above 90. Continue Bipap qhs 12/5 FiO2 30%. Repeat CXR today unchanged apical pneumothoraces and improving pulm edema. ILD: s/p pulse dose steroids, tapered to 40mg qd. ESR/CRP elevated, RF neg, will f/u rest of AI workup CCP, SAJI, dsDNA, anti-centromere ab, SCL70 for scleroderma, SSA/SSB for Sjogren's and myomarker panel. Added cANCA and pANCA. Continue Spiriva daily and albuterol nebs prn.  4. GI: Pt with decreased oral intake, pending repeat S&S eval. She will need PEG for nutrition, plan discussed with daughter and , awaiting decision. Continue mesalamine and witch hazel for colitis. Continue PPI.  5. Renal: stable kidney function, lytes normal  6. ID: s/p course of Zosyn for PNA, completed 1/14. No evidence of active infection currently, observe off systemic antibiotics. Continue Nystatin Mouthwash for oral thrush  7. Heme: continue lovenox for DVT ppx  8. Endo: no active issues  9. Skin: no lines or avendano  10. Dispo: full code, overall poor prognosis given advanced lung disease.   11. GOC: Meeting held today for GOC discussion with Daughter Eva and  Marialuisa. Patient has no Health Care Proxy or Advance Directives. Discussed patients overall poor prognosis given waxing-waning status in setting of end stage ILD and noncompliance with treatments offered. Specific GOC identified which would not be attainable at this point such as better quality of life, living without tracheostomy and PEG tube as well as longevity/cure/remission discussed in detail. The following options were discussed and recommendations made:  - indefinite maximal life-prolonging treatment which includes tracheostomy/PEG tube placement  - comfort measures only  - DNR/DNI   Daughter and  will try ask the patient her wishes when she is more awake and alert today. Awaiting their decision. Will attempt to contact Dr Hazel at [facility] to discuss above. 1. Neuro: Anxiety/Delirium: continued symptoms overnight, Continue remeron qhs. Will add xanax 0.125mg bid with prn doses q8. Avoid use of ativan due to intolerance. Seizures: continue Trileptal, last level therapeutic on 1/11.  2. CVS: HFpEF: pulm edema on CXR yesterday, improving on repeat imaging today, will give Lasix 20mg IV push x1. HD stable, continue home propranolol dose  3. Pulm: Acute hypercarbic respiratory failure: ABG this am consistent with respiratory acidosis, PCO2 >103. Pt refusing bipap overnight. Placed on Bipap with improvement in mental status and ABG. Keep on NC 2L during the day, keep SpO2 above 90. Continue Bipap qhs 12/5 FiO2 30%. Repeat CXR today unchanged apical pneumothoraces and improving pulm edema. ILD: s/p pulse dose steroids, tapered to 40mg qd. ESR/CRP elevated, RF neg, will f/u rest of AI workup CCP, SAJI, dsDNA, anti-centromere ab, SCL70 for scleroderma, SSA/SSB for Sjogren's and myomarker panel. Added cANCA and pANCA. Continue Spiriva daily and albuterol nebs prn.  4. GI: Pt with decreased oral intake, pending repeat S&S eval. She will need PEG for nutrition, plan discussed with daughter and , awaiting decision. Continue mesalamine and witch hazel for colitis. Continue PPI.  5. Renal: stable kidney function, lytes normal  6. ID: s/p course of Zosyn for PNA, completed 1/14. No evidence of active infection currently, observe off systemic antibiotics. Continue Nystatin Mouthwash for oral thrush  7. Heme: continue lovenox for DVT ppx  8. Endo: no active issues  9. Skin: no lines or avendano  10. Dispo: full code, overall poor prognosis given advanced lung disease.   11. GOC: Meeting held today for GOC discussion with Daughter Eva and  Marialuisa. Patient has no Health Care Proxy or Advance Directives. Discussed patients overall poor prognosis given waxing-waning status in setting of end stage ILD and noncompliance with treatments offered. Specific GOC identified which would not be attainable at this point such as better quality of life, living without tracheostomy and PEG tube as well as longevity/cure/remission discussed in detail. The following options were discussed and recommendations made:  - indefinite maximal life-prolonging treatment which includes tracheostomy/PEG tube placement  - comfort measures only  - DNR/DNI   Daughter and  will try ask the patient her wishes when she is more awake and alert today. Awaiting their decision. Will attempt to contact Dr Hazel at [facility] to discuss above. 1. Neuro: Anxiety/Delirium: continued symptoms overnight, Continue remeron qhs. Will add xanax 0.125mg bid with prn doses q8. Avoid use of ativan due to intolerance. Seizures: continue Trileptal, last level therapeutic on 1/11.  2. CVS: HFpEF: pulm edema on CXR yesterday, improving on repeat imaging today, will give Lasix 20mg IV push x1. HD stable, continue home propranolol dose  3. Pulm: Acute hypercarbic respiratory failure: ABG this am consistent with respiratory acidosis, PCO2 >103. Pt refusing bipap overnight. Placed on Bipap with improvement in mental status and ABG. Keep on NC 2L during the day, keep SpO2 above 90. Continue Bipap qhs 12/5 FiO2 30%. Repeat CXR today unchanged apical pneumothoraces and improving pulm edema. ILD: s/p pulse dose steroids, tapered to 40mg qd. ESR/CRP elevated, RF neg, will f/u rest of AI workup CCP, SAJI, dsDNA, anti-centromere ab, SCL70 for scleroderma, SSA/SSB for Sjogren's and myomarker panel. Added cANCA and pANCA. Continue Spiriva daily and albuterol nebs prn.  4. GI: Pt with decreased oral intake, pending repeat S&S eval. She will need PEG for nutrition, plan discussed with daughter and , awaiting decision. Continue mesalamine and witch hazel for colitis. Continue PPI.  5. Renal: stable kidney function, lytes normal  6. ID: s/p course of Zosyn for PNA, completed 1/14. No evidence of active infection currently, observe off systemic antibiotics. Continue Nystatin Mouthwash for oral thrush  7. Heme: continue lovenox for DVT ppx  8. Endo: no active issues  9. Skin: no lines or avendano  10. Dispo: full code, overall poor prognosis given advanced lung disease.   11. GOC: Meeting held today for GOC discussion with Daughter Eva and  Marialuisa. Patient has no Health Care Proxy or Advance Directives. Discussed patients overall poor prognosis given waxing-waning status in setting of end stage ILD and noncompliance with treatments offered. Specific GOC identified which would not be attainable at this point such as better quality of life, living without tracheostomy and PEG tube as well as longevity/cure/remission discussed in detail. The following options were discussed and recommendations made:  - indefinite maximal life-prolonging treatment which includes tracheostomy/PEG tube placement  - comfort measures only  - DNR/DNI   Daughter and  will try ask the patient her wishes when she is more awake and alert today. Awaiting their decision. Will attempt to contact Dr Hazel at [facility] to discuss above.

## 2020-01-22 NOTE — PROGRESS NOTE ADULT - ATTENDING COMMENTS
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  Hospital course complicated by development of HCAP, acute on chronic hypoxemic respiratory failure requiring high flow NC, and agitation/delirium.     1. Neuro: marked anxiety and delirium, now improved with alprazolam 0.125 mg, start twice daily with prn. AAOx2 today. Continue mirtazapine. F/up Psych. Continue Trileptal for seizures, level therapeutic on 1/11  2. CV: HD stable, continue propranolol, diuresis with furosemide 20 mg IV x1  3. Pulm: worsening hypercapnia today in the setting of non-adherence with nocturnal BiPAP. Now improved with wearing BiPAP this morning. I stressed to patient and family the importance of adherence with nocturnal BiPAP. Supp O2 with nasal cannula during the day, goal SpO2>88%.  Repeat CXR with stable small bilateral apical pneumothoraces. Patient has declined chest tube placement. Will need to be cautious with NIPPV given pneumothoraces. Avoid hyperoxia. Continue prednisone 40 mg daily, will need slow taper. S/p pulse methylprednisolone last week. ESR elevated to 34, CRP elevated to 5.86, RF negative. F/up CCP, SAJI, dsDNA, Rosales, centromere, scleroderma, sjogren's, myomarker panel. Continue Spiriva daily and albuterol nebs prn  4. GI: pureed with nectar-thick diet, may need GI eval for PEG although patient has previously declined with significant history of anorexia. Continue mesalamine and witch hazel for colitis  5. Renal: stable kidney function and lytes, continue to monitor  6. ID: s/p course of Zosyn, completed 1/14. No evidence of active infection currently, observe off systemic antibiotics. Continue First Mouthwash for oral thrush  7. Heme: enoxaparin for DVT ppx, monitor leukocytosis  8. Endo: no active issues  9. Skin: no lines or avendano  10. Dispo: full code, discussed with patient and family at bedside, very poor prognosis given advanced lung disease. 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  Hospital course complicated by development of HCAP, acute on chronic hypoxemic respiratory failure requiring high flow NC, and agitation/delirium.     1. Neuro: marked anxiety and delirium, now improved with alprazolam 0.125 mg, start twice daily with prn. AAOx2 today. Continue mirtazapine. F/up Psych. Continue Trileptal for seizures, level therapeutic on 1/11  2. CV: HD stable, continue propranolol, diuresis with furosemide 20 mg IV x1  3. Pulm: worsening hypercapnia today in the setting of non-adherence with nocturnal BiPAP. Now improved with wearing BiPAP this morning. I stressed to patient and family the importance of adherence with nocturnal BiPAP. Supp O2 with nasal cannula during the day, goal SpO2>88%.  Repeat CXR with stable small bilateral apical pneumothoraces. Patient has declined chest tube placement. Will need to be cautious with NIPPV given pneumothoraces. Avoid hyperoxia. Continue prednisone 40 mg daily, will need slow taper. S/p pulse methylprednisolone last week. ESR elevated to 34, CRP elevated to 5.86, RF negative. F/up CCP, SAJI, dsDNA, Rosales, centromere, scleroderma, sjogren's, myomarker panel. Continue Spiriva daily and albuterol nebs prn  4. GI: pureed with nectar-thick diet, may need GI eval for PEG although patient has previously declined with significant history of anorexia. Continue mesalamine and witch hazel for colitis  5. Renal: stable kidney function and lytes, continue to monitor  6. ID: s/p course of Zosyn, completed 1/14. No evidence of active infection currently, observe off systemic antibiotics. Continue First Mouthwash for oral thrush  7. Heme: enoxaparin for DVT ppx, monitor leukocytosis  8. Endo: no active issues  9. Skin: no lines or avendano  10. Dispo: full code, discussed with patient but she is delirious currently. I had family meeting with  and daughter regarding patient's condition. I believe that she is approaching end-of-life. The challenge is that when she is awake and alert, she refuses therapies, such as BiPAP, physical activity, and eating. However, when she refuses nocturnal BiPAP, her morning hypercapnia causes her to be somnolent and unresponsive that only recovers with BiPAP. She will soon likely require invasive mechanical ventilation, which will inevitably result in tracheostomy and vent-dependence, which would be terminal for her as she is not a candidate for lung transplant at this time given her history of non-adherence, cachexia, poor functional status, and high steroid-requirements. I called Dr. Bettye Hazel (her pulmonologist at [facility]) and discussed the case. 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  Hospital course complicated by development of HCAP, acute on chronic hypoxemic respiratory failure requiring high flow NC, and agitation/delirium.     1. Neuro: marked anxiety and delirium, now improved with alprazolam 0.125 mg, start twice daily with prn. AAOx2 today. Continue mirtazapine. F/up Psych. Continue Trileptal for seizures, level therapeutic on 1/11  2. CV: HD stable, continue propranolol, diuresis with furosemide 20 mg IV x1  3. Pulm: worsening hypercapnia today in the setting of non-adherence with nocturnal BiPAP. Now improved with wearing BiPAP this morning. I stressed to patient and family the importance of adherence with nocturnal BiPAP. Supp O2 with nasal cannula during the day, goal SpO2>88%.  Repeat CXR with stable small bilateral apical pneumothoraces. Patient has declined chest tube placement. Will need to be cautious with NIPPV given pneumothoraces. Avoid hyperoxia. Continue prednisone 40 mg daily, will need slow taper. S/p pulse methylprednisolone last week. ESR elevated to 34, CRP elevated to 5.86, RF negative. F/up CCP, SAJI, dsDNA, Rosales, centromere, scleroderma, sjogren's, myomarker panel. Continue Spiriva daily and albuterol nebs prn  4. GI: pureed with nectar-thick diet, may need GI eval for PEG although patient has previously declined with significant history of anorexia. Continue mesalamine and witch hazel for colitis  5. Renal: stable kidney function and lytes, continue to monitor  6. ID: s/p course of Zosyn, completed 1/14. No evidence of active infection currently, observe off systemic antibiotics. Continue First Mouthwash for oral thrush  7. Heme: enoxaparin for DVT ppx, monitor leukocytosis  8. Endo: no active issues  9. Skin: no lines or avendano  10. Dispo: full code, discussed with patient but she is delirious currently. I had family meeting with  and daughter regarding patient's condition. I believe that she is approaching end-of-life. The challenge is that when she is awake and alert, she refuses therapies, such as BiPAP, physical activity, and eating. However, when she refuses nocturnal BiPAP, her morning hypercapnia causes her to be somnolent and unresponsive that only recovers with BiPAP. She will soon likely require invasive mechanical ventilation, which will inevitably result in tracheostomy and vent-dependence, which would be terminal for her as she is not a candidate for lung transplant at this time given her history of non-adherence, cachexia, poor functional status, and high steroid-requirements. I called Dr. Bettye Hazel (her pulmonologist at [facility]) and discussed the case. 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 Family also deciding regarding PEG tube. Would not recommend TPN at this time given immunocompromised state on chronic steroids.  CC time spent: 50 min

## 2020-01-22 NOTE — PROGRESS NOTE ADULT - ASSESSMENT
Assessment and Plan:  -Septic shock and acute hypoxic and hypercapnic respiratory failure 2/2 hospital acquired pneumonia, right pneumothorax, and ILD:  Shock has resolved.  Off vasopressors.  Completed course of IV antibiotics.  s/p removal of chest tube on 1/16.  Continue Prednisone 40mg PO daily, Spiriva inh daily, Albuterol Neb Q6h PRN, and O2/BiPAP support.  Awaiting possible transfer to [facility].  ID, Pulmonary, and Intensivist f/u  -Colitis:  Completed course of IV antibiotics.  Continue mesalamine 1000mg AR QHS.  GI f/u  -Nausea and GERD:  Zofran 4mg PO Q12h PRN, Simethicone 80mg PO BID PRN, Carafate 1gm PO BID, and Protonix 40mg IV daily.   -Seizure disorder:  continue Trileptal 300mg PO BID.  Neurology f/u  -Thrush:  continue nystatin swish and swallow four times a day  -VTE ppx: Lovenox 40mg SQ daily

## 2020-01-22 NOTE — PROVIDER CONTACT NOTE (CRITICAL VALUE NOTIFICATION) - NAME OF MD/NP/PA/DO NOTIFIED:
States last eye exam June with [facility] and was normal.    States since 3 weeks ago after crown replacement when Left side of jaw/tongue/lip numb has had intermittent/slight vision changes to left eye also.    States looks blurry at times in lower part of eye, \"feels scratchy\" dry sensation, applied drops with no relief. Not worsening.    No facial droop, no loss of vision, no floaters seen.    Asking if neurology will address eye issue as well.    STAT opthalmology referral? State cannot get in at [facility] until September.     Dr. CURTIS Love

## 2020-01-22 NOTE — PROGRESS NOTE ADULT - SUBJECTIVE AND OBJECTIVE BOX
INTERVAL HPI/OVERNIGHT EVENTS:  Overnight patient refused BiPAP, very lethargic this AM, ABG showing respiratory acidosis- placed on BiPAP. Patient minimally responsive while on BiPAP, denies any pain, wants to take BiPAP off.    MEDICATIONS  (STANDING):  ALPRAZolam 0.125 milliGRAM(s) Oral two times a day  enoxaparin Injectable 40 milliGRAM(s) SubCutaneous daily  FIRST- Mouthwash  BLM 5 milliLiter(s) Swish and Spit four times a day  hydrocortisone 2.5% Rectal Cream 1 Application(s) Rectal two times a day  hydrocortisone hemorrhoidal Suppository 1 Suppository(s) Rectal two times a day  influenza   Vaccine 0.5 milliLiter(s) IntraMuscular once  lactobacillus acidophilus 1 Tablet(s) Oral three times a day with meals  lidocaine 2% Gel 1 Application(s) Topical three times a day  mesalamine Suppository 1000 milliGRAM(s) Rectal at bedtime  mirtazapine Soltab 15 milliGRAM(s) Oral at bedtime  nystatin    Suspension 173402 Unit(s) Swish and Swallow four times a day  OXcarbazepine 300 milliGRAM(s) Oral two times a day  pantoprazole  Injectable 40 milliGRAM(s) IV Push daily  predniSONE   Tablet 40 milliGRAM(s) Oral daily  propranolol 20 milliGRAM(s) Oral three times a day  sucralfate 1 Gram(s) Oral two times a day  tiotropium 18 MICROgram(s) Capsule 1 Capsule(s) Inhalation daily  witch hazel Pads 1 Application(s) Topical three times a day  zinc oxide 20% Ointment 1 Application(s) Topical two times a day    MEDICATIONS  (PRN):  acetaminophen   Tablet .. 650 milliGRAM(s) Oral every 6 hours PRN Temp greater or equal to 38C (100.4F), Mild Pain (1 - 3)  ALBUTerol    0.083% 2.5 milliGRAM(s) Nebulizer every 6 hours PRN Shortness of Breath and/or Wheezing  ALPRAZolam 0.125 milliGRAM(s) Oral every 8 hours PRN agitation/anxiety  ondansetron    Tablet 4 milliGRAM(s) Oral every 12 hours PRN Nausea  simethicone 80 milliGRAM(s) Chew two times a day PRN Gas      Allergies    IV Contrast (Anaphylaxis)  shellfish. (Anaphylaxis)    Intolerances      Unable to obtain ROS 2/2 medical condition    Vital Signs Last 24 Hrs  T(C): 36.8 (22 Jan 2020 15:40), Max: 36.8 (21 Jan 2020 20:37)  T(F): 98.3 (22 Jan 2020 15:40), Max: 98.3 (22 Jan 2020 15:40)  HR: 105 (22 Jan 2020 15:00) (73 - 114)  BP: 127/75 (22 Jan 2020 15:00) (102/67 - 172/81)  BP(mean): 94 (22 Jan 2020 15:00) (78 - 128)  RR: 36 (22 Jan 2020 15:00) (23 - 59)  SpO2: 90% (22 Jan 2020 15:00) (90% - 100%)    01-21 @ 07:01  -  01-22 @ 07:00  --------------------------------------------------------  IN: 420 mL / OUT: 0 mL / NET: 420 mL      Physical Exam:  General: mild resp distress  Neurology: nonfocal  Respiratory: diminished breath sounds B/L  CV: RRR, S1S2  Abdominal: Soft, NT, ND +BS  Extremities: + peripheral pulses      LABS:                        11.3   15.02 )-----------( 505      ( 22 Jan 2020 05:25 )             37.3     01-22    137  |  88<L>  |  19  ----------------------------<  135<H>  3.5   |  >45<HH>  |  0.39<L>    Ca    9.4      22 Jan 2020 05:25  Phos  3.4     01-22  Mg     2.1     01-22            RADIOLOGY & ADDITIONAL TESTS:

## 2020-01-22 NOTE — PROGRESS NOTE ADULT - SUBJECTIVE AND OBJECTIVE BOX
INTERVAL HPI/OVERNIGHT EVENTS:  interim events noted  pending swallow eval    MEDICATIONS  (STANDING):  enoxaparin Injectable 40 milliGRAM(s) SubCutaneous daily  FIRST- Mouthwash  BLM 5 milliLiter(s) Swish and Spit four times a day  hydrocortisone 2.5% Rectal Cream 1 Application(s) Rectal two times a day  hydrocortisone hemorrhoidal Suppository 1 Suppository(s) Rectal two times a day  influenza   Vaccine 0.5 milliLiter(s) IntraMuscular once  lactobacillus acidophilus 1 Tablet(s) Oral three times a day with meals  lidocaine 2% Gel 1 Application(s) Topical three times a day  mesalamine Suppository 1000 milliGRAM(s) Rectal at bedtime  mirtazapine Soltab 15 milliGRAM(s) Oral at bedtime  nystatin    Suspension [id number] Unit(s) Swish and Swallow four times a day  OXcarbazepine 300 milliGRAM(s) Oral two times a day  pantoprazole  Injectable 40 milliGRAM(s) IV Push daily  potassium phosphate / sodium phosphate powder 1 Packet(s) Oral three times a day  potassium phosphate IVPB 15 milliMole(s) IV Intermittent once  predniSONE   Tablet 40 milliGRAM(s) Oral daily  propranolol 20 milliGRAM(s) Oral three times a day  sucralfate 1 Gram(s) Oral two times a day  tiotropium 18 MICROgram(s) Capsule 1 Capsule(s) Inhalation daily  witch hazel Pads 1 Application(s) Topical three times a day  zinc oxide 20% Ointment 1 Application(s) Topical two times a day    MEDICATIONS  (PRN):  acetaminophen   Tablet .. 650 milliGRAM(s) Oral every 6 hours PRN Temp greater or equal to 38C (100.4F), Mild Pain (1 - 3)  ALBUTerol    0.083% 2.5 milliGRAM(s) Nebulizer every 6 hours PRN Shortness of Breath and/or Wheezing  OLANZapine Injectable 2.5 milliGRAM(s) IntraMuscular every 12 hours PRN agitation or anxiety  ondansetron    Tablet 4 milliGRAM(s) Oral every 12 hours PRN Nausea  simethicone 80 milliGRAM(s) Chew two times a day PRN Gas      Allergies    IV Contrast (Anaphylaxis)  shellfish. (Anaphylaxis)    Intolerances        Review of Systems:    General:  No wt loss, fevers, chills, night sweats, fatigue   Eyes:  Good vision, no reported pain  ENT:  No sore throat, pain, runny nose, dysphagia  CV:  No pain, palpitations, hypo/hypertension  Resp:  No dyspnea, cough, tachypnea, wheezing  GI:  No pain, No nausea, No vomiting, No diarrhea, No constipation, No weight loss, No fever, No pruritis, No rectal bleeding, No melena, No dysphagia  :  No pain, bleeding, incontinence, nocturia  Muscle:  No pain, weakness  Neuro:  No weakness, tingling, memory problems  Psych:  No fatigue, insomnia, mood problems, depression  Endocrine:  No polyuria, polydypsia, cold/heat intolerance  Heme:  No petechiae, ecchymosis, easy bruisability  Skin:  No rash, tattoos, scars, edema      Vital Signs Last 24 Hrs  T(C): 36.6 (21 Jan 2020 07:44), Max: 37.1 (20 Jan 2020 23:40)  T(F): 97.8 (21 Jan 2020 07:44), Max: 98.8 (20 Jan 2020 23:40)  HR: 85 (21 Jan 2020 08:24) (76 - 121)  BP: 135/74 (21 Jan 2020 08:00) (97/63 - 135/74)  BP(mean): 98 (21 Jan 2020 08:00) (75 - 104)  RR: 38 (21 Jan 2020 08:00) (14 - 47)  SpO2: 95% (21 Jan 2020 08:24) (89% - 99%)    PHYSICAL EXAM:    Constitutional: lying in bed   HEENT: ncat  Neck: No LAD  Gastrointestinal: soft nt nd  Extremities: No peripheral edema  Neurological: awake alert      LABS:                        11.7   10.02 )-----------( 495      ( 21 Jan 2020 05:32 )             38.4     01-21    134<L>  |  86<L>  |  17  ----------------------------<  105<H>  4.0   |  44<H>  |  0.51    Ca    9.4      21 Jan 2020 05:32  Phos  2.3     01-21  Mg     2.0     01-21            RADIOLOGY & ADDITIONAL TESTS:

## 2020-01-23 ENCOUNTER — TRANSCRIPTION ENCOUNTER (OUTPATIENT)
Age: 60
End: 2020-01-23

## 2020-01-23 LAB
ALBUMIN SERPL ELPH-MCNC: 2.8 G/DL — LOW (ref 3.3–5)
ALP SERPL-CCNC: 109 U/L — SIGNIFICANT CHANGE UP (ref 40–120)
ALT FLD-CCNC: 26 U/L — SIGNIFICANT CHANGE UP (ref 12–78)
ANION GAP SERPL CALC-SCNC: <7 MMOL/L — SIGNIFICANT CHANGE UP (ref 5–17)
APPEARANCE UR: ABNORMAL
AST SERPL-CCNC: 19 U/L — SIGNIFICANT CHANGE UP (ref 15–37)
BASE EXCESS BLDV CALC-SCNC: 22 MMOL/L — HIGH (ref -2–2)
BASOPHILS # BLD AUTO: 0 K/UL — SIGNIFICANT CHANGE UP (ref 0–0.2)
BASOPHILS NFR BLD AUTO: 0 % — SIGNIFICANT CHANGE UP (ref 0–2)
BILIRUB SERPL-MCNC: 0.3 MG/DL — SIGNIFICANT CHANGE UP (ref 0.2–1.2)
BILIRUB UR-MCNC: NEGATIVE — SIGNIFICANT CHANGE UP
BLOOD GAS COMMENTS, VENOUS: SIGNIFICANT CHANGE UP
BLOOD GAS COMMENTS, VENOUS: SIGNIFICANT CHANGE UP
BUN SERPL-MCNC: 24 MG/DL — HIGH (ref 7–23)
CALCIUM SERPL-MCNC: 9.8 MG/DL — SIGNIFICANT CHANGE UP (ref 8.5–10.1)
CCP IGG SERPL-ACNC: <8 UNITS — SIGNIFICANT CHANGE UP (ref 0–19)
CHLORIDE SERPL-SCNC: 88 MMOL/L — LOW (ref 96–108)
CO2 SERPL-SCNC: >45 MMOL/L — CRITICAL HIGH (ref 22–31)
COLOR SPEC: YELLOW — SIGNIFICANT CHANGE UP
CREAT SERPL-MCNC: 0.53 MG/DL — SIGNIFICANT CHANGE UP (ref 0.5–1.3)
DIFF PNL FLD: ABNORMAL
DSDNA AB SER-ACNC: <12 IU/ML — SIGNIFICANT CHANGE UP
EOSINOPHIL # BLD AUTO: 0.2 K/UL — SIGNIFICANT CHANGE UP (ref 0–0.5)
EOSINOPHIL NFR BLD AUTO: 1 % — SIGNIFICANT CHANGE UP (ref 0–6)
GLUCOSE SERPL-MCNC: 107 MG/DL — HIGH (ref 70–99)
GLUCOSE UR QL: NEGATIVE — SIGNIFICANT CHANGE UP
HCO3 BLDV-SCNC: 44 MMOL/L — HIGH (ref 21–29)
HCT VFR BLD CALC: 36.7 % — SIGNIFICANT CHANGE UP (ref 34.5–45)
HGB BLD-MCNC: 11 G/DL — LOW (ref 11.5–15.5)
HOROWITZ INDEX BLDV+IHG-RTO: 21 — SIGNIFICANT CHANGE UP
KETONES UR-MCNC: NEGATIVE — SIGNIFICANT CHANGE UP
LEUKOCYTE ESTERASE UR-ACNC: NEGATIVE — SIGNIFICANT CHANGE UP
LYMPHOCYTES # BLD AUTO: 0.4 K/UL — LOW (ref 1–3.3)
LYMPHOCYTES # BLD AUTO: 2 % — LOW (ref 13–44)
MAGNESIUM SERPL-MCNC: 2 MG/DL — SIGNIFICANT CHANGE UP (ref 1.6–2.6)
MCHC RBC-ENTMCNC: 28 PG — SIGNIFICANT CHANGE UP (ref 27–34)
MCHC RBC-ENTMCNC: 30 GM/DL — LOW (ref 32–36)
MCV RBC AUTO: 93.4 FL — SIGNIFICANT CHANGE UP (ref 80–100)
MONOCYTES # BLD AUTO: 1.59 K/UL — HIGH (ref 0–0.9)
MONOCYTES NFR BLD AUTO: 8 % — SIGNIFICANT CHANGE UP (ref 2–14)
NEUTROPHILS # BLD AUTO: 17.7 K/UL — HIGH (ref 1.8–7.4)
NEUTROPHILS NFR BLD AUTO: 88 % — HIGH (ref 43–77)
NITRITE UR-MCNC: NEGATIVE — SIGNIFICANT CHANGE UP
NRBC # BLD: SIGNIFICANT CHANGE UP /100 WBCS (ref 0–0)
PCO2 BLDV: 86 MMHG — HIGH (ref 35–50)
PH BLDV: 7.37 — SIGNIFICANT CHANGE UP (ref 7.35–7.45)
PH UR: 5 — SIGNIFICANT CHANGE UP (ref 5–8)
PHOSPHATE SERPL-MCNC: 2.6 MG/DL — SIGNIFICANT CHANGE UP (ref 2.5–4.5)
PLATELET # BLD AUTO: 441 K/UL — HIGH (ref 150–400)
PO2 BLDV: 57 MMHG — HIGH (ref 25–45)
POTASSIUM SERPL-MCNC: 3.4 MMOL/L — LOW (ref 3.5–5.3)
POTASSIUM SERPL-SCNC: 3.4 MMOL/L — LOW (ref 3.5–5.3)
PROT SERPL-MCNC: 6.4 G/DL — SIGNIFICANT CHANGE UP (ref 6–8.3)
PROT UR-MCNC: 30 MG/DL
RBC # BLD: 3.93 M/UL — SIGNIFICANT CHANGE UP (ref 3.8–5.2)
RBC # FLD: 14.6 % — HIGH (ref 10.3–14.5)
RF+CCP IGG SER-IMP: NEGATIVE — SIGNIFICANT CHANGE UP
SAO2 % BLDV: 88 % — SIGNIFICANT CHANGE UP (ref 67–88)
SODIUM SERPL-SCNC: 140 MMOL/L — SIGNIFICANT CHANGE UP (ref 135–145)
SP GR SPEC: 1.01 — SIGNIFICANT CHANGE UP (ref 1.01–1.02)
UROBILINOGEN FLD QL: NEGATIVE — SIGNIFICANT CHANGE UP
WBC # BLD: 19.89 K/UL — HIGH (ref 3.8–10.5)
WBC # FLD AUTO: 19.89 K/UL — HIGH (ref 3.8–10.5)

## 2020-01-23 PROCEDURE — 99232 SBSQ HOSP IP/OBS MODERATE 35: CPT

## 2020-01-23 PROCEDURE — 71250 CT THORAX DX C-: CPT | Mod: 26

## 2020-01-23 PROCEDURE — 70450 CT HEAD/BRAIN W/O DYE: CPT | Mod: 26

## 2020-01-23 PROCEDURE — 99291 CRITICAL CARE FIRST HOUR: CPT

## 2020-01-23 PROCEDURE — 93970 EXTREMITY STUDY: CPT | Mod: 26

## 2020-01-23 RX ORDER — UMECLIDINIUM 62.5 UG/1
1 AEROSOL, POWDER ORAL
Qty: 0 | Refills: 0 | DISCHARGE

## 2020-01-23 RX ORDER — POTASSIUM CHLORIDE 20 MEQ
40 PACKET (EA) ORAL ONCE
Refills: 0 | Status: DISCONTINUED | OUTPATIENT
Start: 2020-01-23 | End: 2020-01-23

## 2020-01-23 RX ORDER — ONDANSETRON 8 MG/1
4 TABLET, FILM COATED ORAL EVERY 6 HOURS
Refills: 0 | Status: DISCONTINUED | OUTPATIENT
Start: 2020-01-23 | End: 2020-01-23

## 2020-01-23 RX ORDER — OXCARBAZEPINE 300 MG/1
1 TABLET, FILM COATED ORAL
Qty: 0 | Refills: 0 | DISCHARGE

## 2020-01-23 RX ORDER — ACETAZOLAMIDE 250 MG/1
250 TABLET ORAL ONCE
Refills: 0 | Status: DISCONTINUED | OUTPATIENT
Start: 2020-01-23 | End: 2020-01-23

## 2020-01-23 RX ORDER — POTASSIUM CHLORIDE 20 MEQ
10 PACKET (EA) ORAL
Refills: 0 | Status: COMPLETED | OUTPATIENT
Start: 2020-01-23 | End: 2020-01-23

## 2020-01-23 RX ORDER — PROPRANOLOL HCL 160 MG
1 CAPSULE, EXTENDED RELEASE 24HR ORAL
Qty: 0 | Refills: 0 | DISCHARGE

## 2020-01-23 RX ORDER — ACETAZOLAMIDE 250 MG/1
250 TABLET ORAL ONCE
Refills: 0 | Status: COMPLETED | OUTPATIENT
Start: 2020-01-23 | End: 2020-01-23

## 2020-01-23 RX ORDER — ONDANSETRON 8 MG/1
4 TABLET, FILM COATED ORAL EVERY 6 HOURS
Refills: 0 | Status: DISCONTINUED | OUTPATIENT
Start: 2020-01-23 | End: 2020-01-24

## 2020-01-23 RX ORDER — ONDANSETRON 8 MG/1
1 TABLET, FILM COATED ORAL
Qty: 0 | Refills: 0 | DISCHARGE

## 2020-01-23 RX ADMIN — Medication 500000 UNIT(S): at 06:01

## 2020-01-23 RX ADMIN — Medication 1 SUPPOSITORY(S): at 06:01

## 2020-01-23 RX ADMIN — AER TRAVELER 1 APPLICATION(S): 0.5 SOLUTION RECTAL; TOPICAL at 21:47

## 2020-01-23 RX ADMIN — LIDOCAINE 1 APPLICATION(S): 4 CREAM TOPICAL at 21:48

## 2020-01-23 RX ADMIN — Medication 100 MILLIEQUIVALENT(S): at 10:32

## 2020-01-23 RX ADMIN — Medication 1 APPLICATION(S): at 22:00

## 2020-01-23 RX ADMIN — DIPHENHYDRAMINE HYDROCHLORIDE AND LIDOCAINE HYDROCHLORIDE AND ALUMINUM HYDROXIDE AND MAGNESIUM HYDRO 5 MILLILITER(S): KIT at 06:01

## 2020-01-23 RX ADMIN — Medication 1 GRAM(S): at 06:00

## 2020-01-23 RX ADMIN — LIDOCAINE 1 APPLICATION(S): 4 CREAM TOPICAL at 13:47

## 2020-01-23 RX ADMIN — Medication 40 MILLIGRAM(S): at 06:00

## 2020-01-23 RX ADMIN — Medication 1000 MILLIGRAM(S): at 21:48

## 2020-01-23 RX ADMIN — ALBUTEROL 2.5 MILLIGRAM(S): 90 AEROSOL, METERED ORAL at 08:37

## 2020-01-23 RX ADMIN — LIDOCAINE 1 APPLICATION(S): 4 CREAM TOPICAL at 06:02

## 2020-01-23 RX ADMIN — Medication 1 APPLICATION(S): at 10:34

## 2020-01-23 RX ADMIN — OXCARBAZEPINE 300 MILLIGRAM(S): 300 TABLET, FILM COATED ORAL at 08:44

## 2020-01-23 RX ADMIN — DIPHENHYDRAMINE HYDROCHLORIDE AND LIDOCAINE HYDROCHLORIDE AND ALUMINUM HYDROXIDE AND MAGNESIUM HYDRO 5 MILLILITER(S): KIT at 00:30

## 2020-01-23 RX ADMIN — ACETAZOLAMIDE 105 MILLIGRAM(S): 250 TABLET ORAL at 14:43

## 2020-01-23 RX ADMIN — ZINC OXIDE 1 APPLICATION(S): 200 OINTMENT TOPICAL at 18:06

## 2020-01-23 RX ADMIN — Medication 500000 UNIT(S): at 00:30

## 2020-01-23 RX ADMIN — AER TRAVELER 1 APPLICATION(S): 0.5 SOLUTION RECTAL; TOPICAL at 13:47

## 2020-01-23 RX ADMIN — AER TRAVELER 1 APPLICATION(S): 0.5 SOLUTION RECTAL; TOPICAL at 06:03

## 2020-01-23 RX ADMIN — ONDANSETRON 4 MILLIGRAM(S): 8 TABLET, FILM COATED ORAL at 20:24

## 2020-01-23 RX ADMIN — Medication 100 MILLIEQUIVALENT(S): at 08:41

## 2020-01-23 RX ADMIN — ENOXAPARIN SODIUM 40 MILLIGRAM(S): 100 INJECTION SUBCUTANEOUS at 13:45

## 2020-01-23 RX ADMIN — Medication 100 MILLIEQUIVALENT(S): at 13:30

## 2020-01-23 RX ADMIN — Medication 1 SUPPOSITORY(S): at 18:06

## 2020-01-23 RX ADMIN — Medication 1 TABLET(S): at 08:41

## 2020-01-23 RX ADMIN — PANTOPRAZOLE SODIUM 40 MILLIGRAM(S): 20 TABLET, DELAYED RELEASE ORAL at 13:45

## 2020-01-23 NOTE — PROGRESS NOTE ADULT - ASSESSMENT
Assessment and Plan:  -Septic shock and acute hypoxic and hypercapnic respiratory failure 2/2 hospital acquired pneumonia, right pneumothorax, and ILD:  Shock has resolved.  Off vasopressors.  Completed course of IV antibiotics.  s/p removal of chest tube on 1/16.  Continue Prednisone 40mg PO daily, Spiriva inh daily, Albuterol Neb Q6h PRN, and O2/BiPAP support.  Awaiting possible transfer to [facility].  ID, Pulmonary, and Intensivist f/u  -Colitis:  Completed course of IV antibiotics.  Continue mesalamine 1000mg GA QHS.  GI f/u  -Nausea and GERD:  Zofran 4mg PO Q12h PRN, Simethicone 80mg PO BID PRN, Carafate 1gm PO BID, and Protonix 40mg IV daily.   -Seizure disorder:  continue Trileptal 300mg PO BID.  Neurology f/u  -Thrush:  continue nystatin swish and swallow four times a day  -VTE ppx: Lovenox 40mg SQ daily

## 2020-01-23 NOTE — SWALLOW BEDSIDE ASSESSMENT ADULT - SWALLOW EVAL: CURRENT DIET
Dysphagia 1 with nectar thick liquids per previous swallow evaluation
NPO pending formal swallow evaluation

## 2020-01-23 NOTE — PROGRESS NOTE ADULT - PROBLEM/PLAN-1
DISPLAY PLAN FREE TEXT

## 2020-01-23 NOTE — PROGRESS NOTE ADULT - SUBJECTIVE AND OBJECTIVE BOX
Date/Time Patient Seen:  		  Referring MD:   Data Reviewed	       Patient is a 59y old  Female who presents with a chief complaint of pneumothorax, colitis, UTI (23 Jan 2020 10:36)      Subjective/HPI     PAST MEDICAL & SURGICAL HISTORY:  Interstitial lung disease: on home o2 prn  NHL (non-Hodgkin's lymphoma): Agem 43 sp chemo/rt/stem cell  Transient cerebral ischemia, unspecified type  Mitral prolapse  Pulmonary disease  History of tonsillectomy  History of appendectomy        Medication list         MEDICATIONS  (STANDING):  enoxaparin Injectable 40 milliGRAM(s) SubCutaneous daily  FIRST- Mouthwash  BLM 5 milliLiter(s) Swish and Spit four times a day  hydrocortisone 2.5% Rectal Cream 1 Application(s) Rectal two times a day  hydrocortisone hemorrhoidal Suppository 1 Suppository(s) Rectal two times a day  influenza   Vaccine 0.5 milliLiter(s) IntraMuscular once  lactobacillus acidophilus 1 Tablet(s) Oral three times a day with meals  lidocaine 2% Gel 1 Application(s) Topical three times a day  mesalamine Suppository 1000 milliGRAM(s) Rectal at bedtime  mirtazapine Soltab 15 milliGRAM(s) Oral at bedtime  nystatin    Suspension [id number] Unit(s) Swish and Swallow four times a day  OXcarbazepine 300 milliGRAM(s) Oral two times a day  pantoprazole  Injectable 40 milliGRAM(s) IV Push daily  potassium chloride  10 mEq/100 mL IVPB 10 milliEquivalent(s) IV Intermittent every 1 hour  predniSONE   Tablet 40 milliGRAM(s) Oral daily  propranolol 20 milliGRAM(s) Oral three times a day  sucralfate 1 Gram(s) Oral two times a day  tiotropium 18 MICROgram(s) Capsule 1 Capsule(s) Inhalation daily  witch hazel Pads 1 Application(s) Topical three times a day  zinc oxide 20% Ointment 1 Application(s) Topical two times a day    MEDICATIONS  (PRN):  acetaminophen   Tablet .. 650 milliGRAM(s) Oral every 6 hours PRN Temp greater or equal to 38C (100.4F), Mild Pain (1 - 3)  ALBUTerol    0.083% 2.5 milliGRAM(s) Nebulizer every 6 hours PRN Shortness of Breath and/or Wheezing  ALPRAZolam 0.125 milliGRAM(s) Oral every 8 hours PRN agitation/anxiety  ondansetron    Tablet 4 milliGRAM(s) Oral every 12 hours PRN Nausea  simethicone 80 milliGRAM(s) Chew two times a day PRN Gas         Vitals log        ICU Vital Signs Last 24 Hrs  T(C): 36.9 (23 Jan 2020 08:00), Max: 36.9 (23 Jan 2020 08:00)  T(F): 98.4 (23 Jan 2020 08:00), Max: 98.4 (23 Jan 2020 08:00)  HR: 104 (23 Jan 2020 11:00) (93 - 116)  BP: 131/74 (23 Jan 2020 11:00) (101/60 - 152/90)  BP(mean): 98 (23 Jan 2020 11:00) (75 - 115)  ABP: --  ABP(mean): --  RR: 28 (23 Jan 2020 11:00) (27 - 54)  SpO2: 94% (23 Jan 2020 11:00) (90% - 100%)           Input and Output:  I&O's Detail    22 Jan 2020 07:01  -  23 Jan 2020 07:00  --------------------------------------------------------  IN:    Oral Fluid: 240 mL  Total IN: 240 mL    OUT:  Total OUT: 0 mL    Total NET: 240 mL      23 Jan 2020 07:01  -  23 Jan 2020 11:17  --------------------------------------------------------  IN:    Solution: 200 mL  Total IN: 200 mL    OUT:  Total OUT: 0 mL    Total NET: 200 mL          Lab Data                        11.0   19.89 )-----------( 441      ( 23 Jan 2020 05:44 )             36.7     01-23    140  |  88<L>  |  24<H>  ----------------------------<  107<H>  3.4<L>   |  >45<HH>  |  0.53    Ca    9.8      23 Jan 2020 05:44  Phos  2.6     01-23  Mg     2.0     01-23    TPro  6.4  /  Alb  2.8<L>  /  TBili  0.3  /  DBili  x   /  AST  19  /  ALT  26  /  AlkPhos  109  01-23    ABG - ( 22 Jan 2020 20:45 )  pH, Arterial: 7.34  pH, Blood: x     /  pCO2: 99    /  pO2: 70    / HCO3: 46    / Base Excess: 24.5  /  SaO2: 92                      Review of Systems	      Objective     Physical Examination    heart s1s2  lung dc BS  abd soft  head nc  head at      Pertinent Lab findings & Imaging      Shauna:  NO   Adequate UO     I&O's Detail    22 Jan 2020 07:01  -  23 Jan 2020 07:00  --------------------------------------------------------  IN:    Oral Fluid: 240 mL  Total IN: 240 mL    OUT:  Total OUT: 0 mL    Total NET: 240 mL      23 Jan 2020 07:01  -  23 Jan 2020 11:17  --------------------------------------------------------  IN:    Solution: 200 mL  Total IN: 200 mL    OUT:  Total OUT: 0 mL    Total NET: 200 mL               Discussed with:     Cultures:	        Radiology

## 2020-01-23 NOTE — PROGRESS NOTE ADULT - PROBLEM SELECTOR PLAN 1
ct showing stercoral colitis c/b rectal pain in setting of hemorrhoids, resolved  monitoring off bowel regimen   cont lido and hydrocortisone cream   cont anusol supp bid, witch hazel pads prn  cont canasa suppository  monitor gi fxn  up to date w colonoscopy, 4/2019 showed only non bleeding internal hemorrhoids

## 2020-01-23 NOTE — SWALLOW BEDSIDE ASSESSMENT ADULT - COMMENTS
Consult received and chart reviewed. Initial clinical swallow assessment completed 1/16, at which time pt was recommended puree with nectar thick liquids. Pt seen for a f/u session to ensure diet tolerance, at which time pt appeared to be tolerating current diet, but with limited PO acceptance, therefore, unable to upgrade diet. Team requesting swallow reassessment this date. Upon arrival, pt significantly lethargic, minimally arousable despite noxious stim. PO trials deferred at this time 2/2 significant lethargy, RN and MD in agreement. Will reattempt tomorrow and/or as schedule permits.
Consult received and chart reviewed. Attempted to see the patient this AM for a re-assessment of swallow function, however, patient currently on BiPAP and retaining CO2 when taken off BiPAP per RN report. Re-consult this department when the patient's respiratory status improves and patient is able to tolerate nasal cannula or room air for the purposes of a swallow re-evaluation. Discussed with RN on unit.
Consult received and chart reviewed. The patient was seen this AM for an initial assessment of swallow function, at which time she was alert and cooperative. The patient's daughter was present at bedside throughout this evaluation. Patient currently receiving supplemental O2 via high-flow nasal cannula. SpO2 remained at 97-98% throughout today's evaluation. The patient is reporting significant xerostomia likely 2/2 diagnosis of thrush per MD on unit.    Per charting, the patient is a "58 y/o F with PMH of interstitial lung disease hx of pneumothorax (on 2L NC at home), hx pulmonary nodules, Meniere's disease, Non-Hodgkin's lymphoma (SCD/XRT/chemo at [facility] 2003), hx of CVA (3/2019 - residual L sided weakness, unable to use her L arm), not on ASA or Plavix due to GIB, Seizure disorder, tachycardia, MVP, hx of chronic SDH, presents with weakness, weight loss, nausea, and diarrhea for the past month. Patient admits to 4 lb weight loss in 3 weeks, was seen by her PCP and started on Zofran one week ago. Patient reports going to wound care prior to admission for bed sores, and was advised ED evaluation." Recent CXR revealed, "The heart is not enlarged. Diffuse interstitial prominence similar to the prior study. Catheter overlies the right lower chest/upper abdomen. There is no definite pneumothorax identified. There is apical pleural thickening or loculated pleural fluid bilaterally. is osteopenia of the bony structures." Discussed results and recommendations from this evaluation with the patient, patient's daughter, RN, and MD on unit.

## 2020-01-23 NOTE — PROGRESS NOTE BEHAVIORAL HEALTH - NSBHCHARTREVIEWVS_PSY_A_CORE FT
Vital Signs Last 24 Hrs  T(C): 36.8 (23 Jan 2020 12:00), Max: 36.9 (23 Jan 2020 08:00)  T(F): 98.3 (23 Jan 2020 12:00), Max: 98.4 (23 Jan 2020 08:00)  HR: 111 (23 Jan 2020 14:06) (93 - 116)  BP: 145/86 (23 Jan 2020 13:00) (101/60 - 145/86)  BP(mean): 109 (23 Jan 2020 13:00) (75 - 109)  RR: 28 (23 Jan 2020 13:00) (27 - 54)  SpO2: 93% (23 Jan 2020 14:06) (90% - 100%)

## 2020-01-23 NOTE — PROGRESS NOTE ADULT - PROBLEM SELECTOR PROBLEM 2
UTI (urinary tract infection)
ACP (advance care planning)
ACP (advance care planning)
Acute respiratory failure
Fever
Nausea
Pneumothorax on right
Tachycardia
UTI (urinary tract infection)
Pneumothorax on right

## 2020-01-23 NOTE — PROGRESS NOTE ADULT - SUBJECTIVE AND OBJECTIVE BOX
INTERVAL HPI/OVERNIGHT EVENTS:  Patient lethargic, not able to tolerate being off BiPAP.    MEDICATIONS  (STANDING):  enoxaparin Injectable 40 milliGRAM(s) SubCutaneous daily  FIRST- Mouthwash  BLM 5 milliLiter(s) Swish and Spit four times a day  hydrocortisone 2.5% Rectal Cream 1 Application(s) Rectal two times a day  hydrocortisone hemorrhoidal Suppository 1 Suppository(s) Rectal two times a day  influenza   Vaccine 0.5 milliLiter(s) IntraMuscular once  lactobacillus acidophilus 1 Tablet(s) Oral three times a day with meals  lidocaine 2% Gel 1 Application(s) Topical three times a day  mesalamine Suppository 1000 milliGRAM(s) Rectal at bedtime  mirtazapine Soltab 15 milliGRAM(s) Oral at bedtime  nystatin    Suspension 776636 Unit(s) Swish and Swallow four times a day  OXcarbazepine 300 milliGRAM(s) Oral two times a day  pantoprazole  Injectable 40 milliGRAM(s) IV Push daily  predniSONE   Tablet 40 milliGRAM(s) Oral daily  propranolol 20 milliGRAM(s) Oral three times a day  sucralfate 1 Gram(s) Oral two times a day  tiotropium 18 MICROgram(s) Capsule 1 Capsule(s) Inhalation daily  witch hazel Pads 1 Application(s) Topical three times a day  zinc oxide 20% Ointment 1 Application(s) Topical two times a day    MEDICATIONS  (PRN):  acetaminophen   Tablet .. 650 milliGRAM(s) Oral every 6 hours PRN Temp greater or equal to 38C (100.4F), Mild Pain (1 - 3)  ALBUTerol    0.083% 2.5 milliGRAM(s) Nebulizer every 6 hours PRN Shortness of Breath and/or Wheezing  ALPRAZolam 0.125 milliGRAM(s) Oral every 8 hours PRN agitation/anxiety  ondansetron    Tablet 4 milliGRAM(s) Oral every 12 hours PRN Nausea  simethicone 80 milliGRAM(s) Chew two times a day PRN Gas      Allergies    IV Contrast (Anaphylaxis)  shellfish. (Anaphylaxis)    Intolerances      Unable to obtain ROS 2/2 medical condition    Vital Signs Last 24 Hrs  T(C): 36.7 (23 Jan 2020 15:05), Max: 36.9 (23 Jan 2020 08:00)  T(F): 98 (23 Jan 2020 15:05), Max: 98.4 (23 Jan 2020 08:00)  HR: 111 (23 Jan 2020 14:06) (93 - 116)  BP: 145/86 (23 Jan 2020 13:00) (101/60 - 145/86)  BP(mean): 109 (23 Jan 2020 13:00) (75 - 109)  RR: 28 (23 Jan 2020 13:00) (27 - 54)  SpO2: 93% (23 Jan 2020 14:06) (90% - 100%)    01-22 @ 07:01 - 01-23 @ 07:00  --------------------------------------------------------  IN: 240 mL / OUT: 0 mL / NET: 240 mL    01-23 @ 07:01 - 01-23 @ 16:03  --------------------------------------------------------  IN: 300 mL / OUT: 0 mL / NET: 300 mL        Physical Exam:  General: mild resp distress  Neurology: nonfocal  Respiratory: diminished breath sounds B/L  CV: RRR, S1S2  Abdominal: Soft, NT, ND +BS  Extremities: + peripheral pulses      LABS:                        11.0   19.89 )-----------( 441      ( 23 Jan 2020 05:44 )             36.7     01-23    140  |  88<L>  |  24<H>  ----------------------------<  107<H>  3.4<L>   |  >45<HH>  |  0.53    Ca    9.8      23 Jan 2020 05:44  Phos  2.6     01-23  Mg     2.0     01-23    TPro  6.4  /  Alb  2.8<L>  /  TBili  0.3  /  DBili  x   /  AST  19  /  ALT  26  /  AlkPhos  109  01-23          RADIOLOGY & ADDITIONAL TESTS:

## 2020-01-23 NOTE — PROGRESS NOTE ADULT - SUBJECTIVE AND OBJECTIVE BOX
Neurology follow up note    ROMIE [id number]      Interval History:    Patient feels ok resting in bed on bipap seen with daughter     MEDICATIONS    acetaminophen   Tablet .. 650 milliGRAM(s) Oral every 6 hours PRN  ALBUTerol    0.083% 2.5 milliGRAM(s) Nebulizer every 6 hours PRN  ALPRAZolam 0.125 milliGRAM(s) Oral every 8 hours PRN  enoxaparin Injectable 40 milliGRAM(s) SubCutaneous daily  FIRST- Mouthwash  BLM 5 milliLiter(s) Swish and Spit four times a day  hydrocortisone 2.5% Rectal Cream 1 Application(s) Rectal two times a day  hydrocortisone hemorrhoidal Suppository 1 Suppository(s) Rectal two times a day  influenza   Vaccine 0.5 milliLiter(s) IntraMuscular once  lactobacillus acidophilus 1 Tablet(s) Oral three times a day with meals  lidocaine 2% Gel 1 Application(s) Topical three times a day  mesalamine Suppository 1000 milliGRAM(s) Rectal at bedtime  mirtazapine Soltab 15 milliGRAM(s) Oral at bedtime  nystatin    Suspension [id number] Unit(s) Swish and Swallow four times a day  ondansetron    Tablet 4 milliGRAM(s) Oral every 12 hours PRN  OXcarbazepine 300 milliGRAM(s) Oral two times a day  pantoprazole  Injectable 40 milliGRAM(s) IV Push daily  potassium chloride  10 mEq/100 mL IVPB 10 milliEquivalent(s) IV Intermittent every 1 hour  predniSONE   Tablet 40 milliGRAM(s) Oral daily  propranolol 20 milliGRAM(s) Oral three times a day  simethicone 80 milliGRAM(s) Chew two times a day PRN  sucralfate 1 Gram(s) Oral two times a day  tiotropium 18 MICROgram(s) Capsule 1 Capsule(s) Inhalation daily  witch hazel Pads 1 Application(s) Topical three times a day  zinc oxide 20% Ointment 1 Application(s) Topical two times a day      Allergies    IV Contrast (Anaphylaxis)  shellfish. (Anaphylaxis)    Intolerances            Vital Signs Last 24 Hrs  T(C): 36.9 (23 Jan 2020 08:00), Max: 36.9 (23 Jan 2020 08:00)  T(F): 98.4 (23 Jan 2020 08:00), Max: 98.4 (23 Jan 2020 08:00)  HR: 104 (23 Jan 2020 11:00) (93 - 116)  BP: 131/74 (23 Jan 2020 11:00) (101/60 - 152/90)  BP(mean): 98 (23 Jan 2020 11:00) (75 - 115)  RR: 28 (23 Jan 2020 11:00) (27 - 54)  SpO2: 94% (23 Jan 2020 11:00) (90% - 100%)    ROS  No headaches.  Eyes:  No double vision or blurry vision.  Ears:  No ringing in the ears.  Neck:  No neck pain.  Respiratory:  Occasional cough with shortness of breath.  Cardiovascular:  no chest pain.  Abdomen:  occ nausea,  no vomiting, or abdominal pain.  Extremities/Neurological:  No numbness or tingling.  Musculoskeletal:  Occasional joint pain.    PHYSICAL EXAMINATION:   HEENT:  Head:  Normocephalic, atraumatic.  Eyes:  No scleral icterus.  Ears:  Hearing bilaterally appeared to be intact.  NECK:  Supple.  RESPIRATORY:  Decreased breath sounds bilaterally, but most prominent on the right.  CARDIOVASCULAR:  S1 and S2 heard.  ABDOMEN:  Soft and nontender.  Extremities:  No clubbing or cyanosis were noted.      NEUROLOGIC:  The patient is awake and alert.    Extraocular movements were intact.  Pupils were equal, round, and reactive bilaterally 3 mm to 2 mm.  Speech was fluent.  Smile was symmetric.  Motor:  Right upper was 5/5, left upper  arm brace was 3/5 decrease rom hand and finger in flexed position , right lower was 4/5, left lower was 3+/5.  As per my conversation with the spouse, after her apparent event back in March, questionable seizure versus questionable stroke.  She was left with left-sided weakness.  Sensory:  Bilateral upper and lower appeared intact to light touch.            LABS:  CBC Full  -  ( 23 Jan 2020 05:44 )  WBC Count : 19.89 K/uL  RBC Count : 3.93 M/uL  Hemoglobin : 11.0 g/dL  Hematocrit : 36.7 %  Platelet Count - Automated : 441 K/uL  Mean Cell Volume : 93.4 fl  Mean Cell Hemoglobin : 28.0 pg  Mean Cell Hemoglobin Concentration : 30.0 gm/dL  Auto Neutrophil # : 17.70 K/uL  Auto Lymphocyte # : 0.40 K/uL  Auto Monocyte # : 1.59 K/uL  Auto Eosinophil # : 0.20 K/uL  Auto Basophil # : 0.00 K/uL  Auto Neutrophil % : 88.0 %  Auto Lymphocyte % : 2.0 %  Auto Monocyte % : 8.0 %  Auto Eosinophil % : 1.0 %  Auto Basophil % : 0.0 %      01-23    140  |  88<L>  |  24<H>  ----------------------------<  107<H>  3.4<L>   |  >45<HH>  |  0.53    Ca    9.8      23 Jan 2020 05:44  Phos  2.6     01-23  Mg     2.0     01-23    TPro  6.4  /  Alb  2.8<L>  /  TBili  0.3  /  DBili  x   /  AST  19  /  ALT  26  /  AlkPhos  109  01-23    Hemoglobin A1C:     LIVER FUNCTIONS - ( 23 Jan 2020 05:44 )  Alb: 2.8 g/dL / Pro: 6.4 g/dL / ALK PHOS: 109 U/L / ALT: 26 U/L / AST: 19 U/L / GGT: x           Vitamin B12         RADIOLOGY    ANALYSIS AND PLAN:  This is a 59-year-old with a history of possibly epilepsy verse TIAs, history of chronic subdural hydromas and change in mental status.    1.	For episode of change in mental status, as per my conversation with the spouse, these appear to occur primarily at the same time at night for the last three to four weeks.  The patient has been on Trileptal for about eight to nine months.  Suspect less likely this is Trileptal, Questionable, the patient could have any type of sleep-related disorder causing these events to occur at night.  I spoke with the spouse, the patient should undergo sleep studies.  2.	For history of chronic subdural hematoma, these appeared to have resolved from previous MRI.  3.	For history of possible underlying epilepsy, for now, I will continue the patient on her Trileptal..  4.	Monitor CO2 levels as needed and respiratory status bipap as needed   5.	spoke outside neurologist Dr. Wallace in past agrees to continue trileptal for now  His telephone number is [phone].  6.	Spouse's name is Marialuisa, his telephone number is [phone] spoke to him at bedside 1/13/2020 spoke to daughter  today   7.	steroids as needed   8.	H/O R pigtail placed by thoracic surgery   9.	no new events  10.	seen with daughter today 1/17/2020 seen with mother today   11.	Greater than 22 minutes of time was spent with the patient, plan of care, reviewing data, speaking to the family and multidisciplinary healthcare team

## 2020-01-23 NOTE — PROGRESS NOTE ADULT - SUBJECTIVE AND OBJECTIVE BOX
INTERVAL HPI/OVERNIGHT EVENTS:  pt seen and examined, dtr present  pt lethargic but denies abd pain  ++bms  dec po      MEDICATIONS  (STANDING):  enoxaparin Injectable 40 milliGRAM(s) SubCutaneous daily  FIRST- Mouthwash  BLM 5 milliLiter(s) Swish and Spit four times a day  hydrocortisone 2.5% Rectal Cream 1 Application(s) Rectal two times a day  hydrocortisone hemorrhoidal Suppository 1 Suppository(s) Rectal two times a day  influenza   Vaccine 0.5 milliLiter(s) IntraMuscular once  lactobacillus acidophilus 1 Tablet(s) Oral three times a day with meals  lidocaine 2% Gel 1 Application(s) Topical three times a day  mesalamine Suppository 1000 milliGRAM(s) Rectal at bedtime  mirtazapine Soltab 15 milliGRAM(s) Oral at bedtime  nystatin    Suspension [id number] Unit(s) Swish and Swallow four times a day  OXcarbazepine 300 milliGRAM(s) Oral two times a day  pantoprazole  Injectable 40 milliGRAM(s) IV Push daily  potassium chloride  10 mEq/100 mL IVPB 10 milliEquivalent(s) IV Intermittent every 1 hour  predniSONE   Tablet 40 milliGRAM(s) Oral daily  propranolol 20 milliGRAM(s) Oral three times a day  sucralfate 1 Gram(s) Oral two times a day  tiotropium 18 MICROgram(s) Capsule 1 Capsule(s) Inhalation daily  witch hazel Pads 1 Application(s) Topical three times a day  zinc oxide 20% Ointment 1 Application(s) Topical two times a day    MEDICATIONS  (PRN):  acetaminophen   Tablet .. 650 milliGRAM(s) Oral every 6 hours PRN Temp greater or equal to 38C (100.4F), Mild Pain (1 - 3)  ALBUTerol    0.083% 2.5 milliGRAM(s) Nebulizer every 6 hours PRN Shortness of Breath and/or Wheezing  ALPRAZolam 0.125 milliGRAM(s) Oral every 8 hours PRN agitation/anxiety  ondansetron    Tablet 4 milliGRAM(s) Oral every 12 hours PRN Nausea  simethicone 80 milliGRAM(s) Chew two times a day PRN Gas      Allergies    IV Contrast (Anaphylaxis)  shellfish. (Anaphylaxis)    Intolerances        Review of Systems:  unable to obtain in entirety    Vital Signs Last 24 Hrs  T(C): 36.9 (23 Jan 2020 08:00), Max: 36.9 (23 Jan 2020 08:00)  T(F): 98.4 (23 Jan 2020 08:00), Max: 98.4 (23 Jan 2020 08:00)  HR: 106 (23 Jan 2020 09:00) (93 - 116)  BP: 112/66 (23 Jan 2020 09:00) (101/60 - 152/90)  BP(mean): 84 (23 Jan 2020 09:00) (75 - 115)  RR: 28 (23 Jan 2020 09:00) (28 - 54)  SpO2: 94% (23 Jan 2020 09:00) (90% - 100%)    PHYSICAL EXAM:    Constitutional: lying in bed   HEENT: ncat  Neck: No LAD  Gastrointestinal: soft nt nd  Extremities: No peripheral edema  Neurological: lethargic but arousable    LABS:                        11.0   19.89 )-----------( 441      ( 23 Jan 2020 05:44 )             36.7     01-23    140  |  88<L>  |  24<H>  ----------------------------<  107<H>  3.4<L>   |  >45<HH>  |  0.53    Ca    9.8      23 Jan 2020 05:44  Phos  2.6     01-23  Mg     2.0     01-23    TPro  6.4  /  Alb  2.8<L>  /  TBili  0.3  /  DBili  x   /  AST  19  /  ALT  26  /  AlkPhos  109  01-23          RADIOLOGY & ADDITIONAL TESTS:

## 2020-01-23 NOTE — PROGRESS NOTE BEHAVIORAL HEALTH - NSBHFUPINTERVALHXFT_PSY_A_CORE
Patient seen, evaluated and chart reviewed.  and daughter by the bedside. Patient receiving treatment at this time. She has been intermittently confused. The issue of decision making capacity was raised. Patient appears to be pleasantly confused.

## 2020-01-23 NOTE — PROGRESS NOTE ADULT - PROBLEM SELECTOR PLAN 4
slp kaitlin noted, unable to reassess  cont dysphagia diet as tolerated  gerd/aspiration prec   cont ppi  assistance/encouragement w meals prn  goc discussions in progress slp kaitlin noted, unable to reassess  cont dysphagia diet as tolerated  gerd/aspiration prec   cont ppi  assistance/encouragement w meals prn  goc discussions in progress, pt is not a candidate for peg placement at this time

## 2020-01-23 NOTE — PROGRESS NOTE ADULT - SUBJECTIVE AND OBJECTIVE BOX
infectious diseases progress note:    ROMIE VIRAMONTES is a 59y y. o. Female patient    Patient refusing Bipap then put on Bipap    Allergies    IV Contrast (Anaphylaxis)  shellfish. (Anaphylaxis)    Intolerances        ANTIBIOTICS/RELEVANT:  antimicrobials  nystatin    Suspension [id number] Unit(s) Swish and Swallow four times a day    immunologic:  influenza   Vaccine 0.5 milliLiter(s) IntraMuscular once    OTHER:  acetaminophen   Tablet .. 650 milliGRAM(s) Oral every 6 hours PRN  ALBUTerol    0.083% 2.5 milliGRAM(s) Nebulizer every 6 hours PRN  ALPRAZolam 0.125 milliGRAM(s) Oral every 8 hours PRN  enoxaparin Injectable 40 milliGRAM(s) SubCutaneous daily  FIRST- Mouthwash  BLM 5 milliLiter(s) Swish and Spit four times a day  hydrocortisone 2.5% Rectal Cream 1 Application(s) Rectal two times a day  hydrocortisone hemorrhoidal Suppository 1 Suppository(s) Rectal two times a day  lactobacillus acidophilus 1 Tablet(s) Oral three times a day with meals  lidocaine 2% Gel 1 Application(s) Topical three times a day  mesalamine Suppository 1000 milliGRAM(s) Rectal at bedtime  mirtazapine Soltab 15 milliGRAM(s) Oral at bedtime  ondansetron    Tablet 4 milliGRAM(s) Oral every 12 hours PRN  OXcarbazepine 300 milliGRAM(s) Oral two times a day  pantoprazole  Injectable 40 milliGRAM(s) IV Push daily  potassium chloride  10 mEq/100 mL IVPB 10 milliEquivalent(s) IV Intermittent every 1 hour  predniSONE   Tablet 40 milliGRAM(s) Oral daily  propranolol 20 milliGRAM(s) Oral three times a day  simethicone 80 milliGRAM(s) Chew two times a day PRN  sucralfate 1 Gram(s) Oral two times a day  tiotropium 18 MICROgram(s) Capsule 1 Capsule(s) Inhalation daily  witch hazel Pads 1 Application(s) Topical three times a day  zinc oxide 20% Ointment 1 Application(s) Topical two times a day      Objective:  Vital Signs Last 24 Hrs  T(C): 36.9 (23 Jan 2020 08:00), Max: 36.9 (23 Jan 2020 08:00)  T(F): 98.4 (23 Jan 2020 08:00), Max: 98.4 (23 Jan 2020 08:00)  HR: 98 (23 Jan 2020 09:00) (93 - 116)  BP: 112/66 (23 Jan 2020 09:00) (101/60 - 152/90)  BP(mean): 84 (23 Jan 2020 09:00) (75 - 115)  RR: 28 (23 Jan 2020 09:00) (28 - 54)  SpO2: 94% (23 Jan 2020 09:00) (90% - 100%)    T(C): 36.9 (01-23-20 @ 08:00), Max: 36.9 (01-23-20 @ 08:00)  T(C): 36.9 (01-23-20 @ 08:00), Max: 37.1 (01-20-20 @ 23:40)  T(C): 36.9 (01-23-20 @ 08:00), Max: 37.1 (01-20-20 @ 23:40)    PHYSICAL EXAM:  Constitutional: Well-developed, well nourished  Eyes: PERRLA, EOMI  Ear/Nose/Throat: on Bipap	  Neck: no JVD, no lymphadenopathy, supple  Respiratory: no accessory muscle use  Cardiovascular: RRR,   Gastrointestinal: soft, NT  Extremities: no clubbing, no cyanosis, edema absent      LABS:                        11.0   19.89 )-----------( 441      ( 23 Jan 2020 05:44 )             36.7       19.89 01-23 @ 05:44  15.02 01-22 @ 05:25  10.02 01-21 @ 05:32  15.59 01-20 @ 05:26  15.29 01-19 @ 05:20  24.28 01-18 @ 08:01  10.64 01-17 @ 05:38      01-23    140  |  88<L>  |  24<H>  ----------------------------<  107<H>  3.4<L>   |  >45<HH>  |  0.53    Ca    9.8      23 Jan 2020 05:44  Phos  2.6     01-23  Mg     2.0     01-23    TPro  6.4  /  Alb  2.8<L>  /  TBili  0.3  /  DBili  x   /  AST  19  /  ALT  26  /  AlkPhos  109  01-23      Creatinine, Serum: 0.53 mg/dL (01-23-20 @ 05:44)  Creatinine, Serum: 0.39 mg/dL (01-22-20 @ 05:25)  Creatinine, Serum: 0.51 mg/dL (01-21-20 @ 05:32)  Creatinine, Serum: 0.44 mg/dL (01-20-20 @ 05:26)  Creatinine, Serum: 0.49 mg/dL (01-19-20 @ 05:20)  Creatinine, Serum: 0.47 mg/dL (01-18-20 @ 04:59)  Creatinine, Serum: 0.41 mg/dL (01-17-20 @ 05:38)                MICROBIOLOGY:              RADIOLOGY & ADDITIONAL STUDIES:

## 2020-01-23 NOTE — PROGRESS NOTE ADULT - ATTENDING COMMENTS
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  Hospital course complicated by development of HCAP, acute hypercapnic respiratory failure requiring BiPAP, and agitation/delirium.     1. Neuro: improved anxiety, continue alprazolam 0.125 mg q8h prn. Continue mirtazapine. F/up Psych. Continue oxcarbazepine for seizures  2. CV: HD stable, continue propranolol, diuresis with acetazolamide 250 mg IV x1 (has received diuresis with furosemide, now with worsening metabolic alkalosis that is partially due to compensation + contraction)  3. Pulm: not tolerating extended periods of time off BiPAP. Will consider Vapotherm high-flow nasal cannula. Family/patient declining intubation at this time, want transfer to [facility]. Repeat CXR with stable small bilateral apical pneumothoraces. Patient has declined chest tube placement. Will need to be cautious with NIPPV given pneumothoraces. Avoid hyperoxia. Continue prednisone 40 mg daily, will need slow taper. S/p pulse methylprednisolone (1/13-1/15). ESR elevated to 34, CRP elevated to 5.86. Remaining autoimmune workup negative, including RF, SS-A, SS-B, scleroderma, and centromere antibodies. F/up CCP, SAJI, dsDNA, Rosales, myomarker panel. Continue Spiriva daily and albuterol nebs prn  4. GI: pureed with nectar-thick diet if able to come off BiPAP. May eventually need GI eval for PEG, although patient has previously declined with significant history of anorexia. Continue mesalamine OK and witch hazel for colitis  5. Renal: stable kidney function and lytes, continue to monitor  6. ID: s/p course of Zosyn, completed 1/14. Now with rising leukocytosis. Check UA, UCx, BCx. Observe off systemic abx at this time. Continue First Mouthwash for oral thrush  7. Heme: enoxaparin for DVT ppx, monitor leukocytosis  8. Endo: no active issues  9. Skin: no lines or avendano  10. Dispo: full code, discussed with patient, , and daughter at bedside. Family requesting transfer to [facility]. I recommended intubation prior to transfer given BiPAP dependence. However, they are refusing, and they understand the risk of respiratory decompensation in transport. I personally believe that she is approaching end-of-life and she will soon likely require invasive mechanical ventilation, which will inevitably result in tracheostomy and vent-dependence, which would be terminal for her as she is not a candidate for lung transplant at this time given her history of non-adherence, cachexia, poor functional status, and high steroid-requirements. Plan to transfer to [facility] when bed available. Family will eventually also need to decide regarding PEG tube. Would not recommend TPN at this time given immunocompromised state on chronic steroids  CC time spent: 35 min

## 2020-01-23 NOTE — PROGRESS NOTE ADULT - SUBJECTIVE AND OBJECTIVE BOX
Patient is a 59y old  Female who presents with a chief complaint of pneumothorax, colitis, UTI (23 Jan 2020 11:29)    24 hour events: overnight patient was very lethargic, placed on bipap overnight and this am.    Pt was off of Bipap for 20-30min this am and placed on NC 4L, became obtunded. Placed back on bipap and is more awake and alert now. Pt is anxious, states she is scared.    REVIEW OF SYSTEMS  unable to obtain 2/2 mental status     T(F): 98.3 (01-23-20 @ 12:00), Max: 98.4 (01-23-20 @ 08:00)  HR: 109 (01-23-20 @ 13:00) (93 - 116)  BP: 145/86 (01-23-20 @ 13:00) (101/60 - 145/86)  RR: 28 (01-23-20 @ 13:00) (27 - 54)  SpO2: 92% (01-23-20 @ 13:00) (90% - 100%)  Wt(kg): --            I&O's Summary    01-22 @ 07:01  -  01-23 @ 07:00  --------------------------------------------------------  IN: 240 mL / OUT: 0 mL / NET: 240 mL    01-23 @ 07:01 - 01-23 @ 13:29  --------------------------------------------------------  IN: 250 mL / OUT: 0 mL / NET: 250 mL      PHYSICAL EXAM  General: lethargic cachectic chronically ill appearing female on bipap  CNS: lethargic however is conversing while on bipap, alert, anxious, moves all ext  HEENT: PERRLA, NC/AT  Resp: crackles b/l lung fields with exp wheezing R>L  CVS: RRR, no murmur  Abd: soft, NT, ND +BS  Ext: no lower ext edema b/l  Skin: warm, well perfused    MEDICATIONS  nystatin    Suspension Swish and Swallow    acetaZOLAMIDE Injectable IV Push  propranolol Oral    predniSONE   Tablet Oral    ALBUTerol    0.083% Nebulizer PRN  tiotropium 18 MICROgram(s) Capsule Inhalation    acetaminophen   Tablet .. Oral PRN  ALPRAZolam Oral PRN  mirtazapine Soltab Oral  ondansetron    Tablet Oral PRN  OXcarbazepine Oral      enoxaparin Injectable SubCutaneous    mesalamine Suppository Rectal  pantoprazole  Injectable IV Push  simethicone Chew PRN  sucralfate Oral      potassium chloride  10 mEq/100 mL IVPB IV Intermittent    influenza   Vaccine IntraMuscular    FIRST- Mouthwash  BLM Swish and Spit  hydrocortisone 2.5% Rectal Cream Rectal  hydrocortisone hemorrhoidal Suppository Rectal  lidocaine 2% Gel Topical  witch hazel Pads Topical  zinc oxide 20% Ointment Topical    lactobacillus acidophilus Oral                          11.0   19.89 )-----------( 441      ( 23 Jan 2020 05:44 )             36.7     Bands 1.0    01-23    140  |  88<L>  |  24<H>  ----------------------------<  107<H>  3.4<L>   |  >45<HH>  |  0.53    Ca    9.8      23 Jan 2020 05:44  Phos  2.6     01-23  Mg     2.0     01-23    TPro  6.4  /  Alb  2.8<L>  /  TBili  0.3  /  DBili  x   /  AST  19  /  ALT  26  /  AlkPhos  109  01-23                    CENTRAL LINE: N  LUKE: N  A-LINE: N    GLOBAL ISSUE/BEST PRACTICE  Analgesia: N  Sedation: N  CAM-ICU: Neg  HOB elevation: yes  Stress ulcer prophylaxis: Y  VTE prophylaxis: Y  Glycemic control: N  Nutrition: Y    CODE STATUS: Full code  GOC Discussion: Y

## 2020-01-23 NOTE — PROGRESS NOTE ADULT - PROBLEM SELECTOR PROBLEM 1
Acute respiratory failure with hypoxia
Pneumothorax on right
Acute respiratory failure
Acute respiratory failure with hypoxia
Interstitial lung disease
Pneumothorax on right
Acute respiratory failure
Acute respiratory failure
Acute respiratory failure with hypoxia
Colitis
Fever
Pneumothorax on right
Fever

## 2020-01-23 NOTE — PROGRESS NOTE BEHAVIORAL HEALTH - NSBHCHARTREVIEWLAB_PSY_A_CORE FT
11.0   19.89 )-----------( 441      ( 23 Jan 2020 05:44 )             36.7   01-23    140  |  88<L>  |  24<H>  ----------------------------<  107<H>  3.4<L>   |  >45<HH>  |  0.53    Ca    9.8      23 Jan 2020 05:44  Phos  2.6     01-23  Mg     2.0     01-23    TPro  6.4  /  Alb  2.8<L>  /  TBili  0.3  /  DBili  x   /  AST  19  /  ALT  26  /  AlkPhos  109  01-23

## 2020-01-23 NOTE — PROGRESS NOTE ADULT - PROBLEM SELECTOR PLAN 1
seen and examined, spoke with DTR this am - overnight events noted and reviewed  imaging and labs reviewed - chr lung disease and ILD and chr recurrent PTX  hx of Lymphoma and SCT in the past -   on o2 support - wean as tolerated -   check sat at rest and on exertion  I and O  completed ABX on this admission  on Steroids at the moment  supportive medical regimen and care and assist with ADL  prognosis remains POOR - frail and weak state - with advanced Lung Disease - on unclear definitive Dx    high risk for Surgical Lung Bx  poss Transfer to [facility].

## 2020-01-23 NOTE — PROGRESS NOTE ADULT - NSHPATTENDINGPLANDISCUSS_GEN_ALL_CORE
patient, RN
patient, RN, daughter
Pulmonary, ID, family at bedside, ICU team
RN, pt, family at bedside
RN, pt, family at bedside
Christian LAUREN
patient, resident
pt, RN - re: tx plan, disposition planning, above detailed plan
pt, RN, family @ bedside, MICU team - re: tx plan, disposition planning, above detailed plan
Dr Morales psych,
pt, RN
pt, RN
RN, pt
pt, RN,  by phone [phone]
pt, RN, daughter @ bedside, MICU team - re: tx plan, disposition planning, above detailed plan
pt, SW, CM, RN, residency team - re: tx plan, disposition planning, above detailed plan
pt, SW, CM, RN, residency team, family @ bedside - re: tx plan, disposition planning, above detailed plan
pt, SW, PHAM, RN, residency team, MICU team, ID - re: tx plan, disposition planning, above detailed plan

## 2020-01-23 NOTE — PROGRESS NOTE ADULT - PROBLEM SELECTOR PLAN 1
pt remains anxious with periods of increased resp rate and anxiety and now requiring Bipap,  suggest this is a combination of mental health issues along with pulmonary disease no clear evidence that his si being driven by an acute bacterial process that would benefit from abx.

## 2020-01-23 NOTE — PROGRESS NOTE ADULT - ASSESSMENT
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  Hospital course complicated by development of HCAP, acute on chronic hypoxemic respiratory failure requiring high flow NC, and agitation/delirium.     1. Neuro: Anxiety/Delirium: Continue remeron qhs. continue xanax 0.125mg prn, will dc standing xanax due to increased lethargy/sedation after one dose yesterday. Avoid use of ativan due to intolerance. Seizures: continue Trileptal, last level therapeutic on 1/11. CT brain ordered to evaluate for encephalopathy.   2. CVS: HFpEF: pulm edema on CXR yesterday, s/p Lasix 20mg IV push x1. Will trial diamox today 250mg IVPB for diuresis as well as correction of metabolic alkalosis. HD stable, continue home propranolol dose,   3. Pulm: Acute hypercarbic respiratory failure: ABG improving, pt tolerating bipap. However patient is bipap dependent at this time, unable to wean to NC this am, pt became obtunded. Continue Bipap qhs 12/5 FiO2 30%. CT chest ordered. ILD: s/p pulse dose steroids, tapered to 40mg qd. ESR/CRP elevated, AI workup neg until now pending more markers. Continue Spiriva daily and albuterol nebs prn.  4. GI: Pt with poor oral intake, pending repeat S&S eval. Malnourished. She will need PEG for nutrition, plan discussed with daughter and , awaiting decision. Continue mesalamine and witch hazel for colitis. Continue PPI.  5. Renal: stable kidney function, lytes normal  6. ID: s/p course of Zosyn for PNA, completed 1/14. Increase in White count today, UA, Urine cx, Blood cx x2 sent. Afebrile. Will observe off systemic antibiotics for now. Continue Nystatin Mouthwash for oral thrush  7. Heme: continue lovenox for DVT ppx  8. Endo: no active issues  9. Skin: no lines or avendano  10. Dispo: full code, overall poor prognosis given advanced lung disease.   11. GOC: discussed with Daughter Eva and  Marialuisa, no decision has been made yet relating to PEG/trach if needed. Patient has no Health Care Proxy or Advance Directives. Pt remains a full code. Family requesting transfer to [facility] under care of Dr Hazel. Family explained benefits and risk of transferring patient at this time. Recommended intubating patient however family refusing at this time. Pt is at a high risk of decompensation during transfer while on Bipap. Family understands the risk. All labs sent to [facility]. Radiology Disks to be given to family upon transfer. 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  Hospital course complicated by development of HCAP, acute on chronic hypoxemic respiratory failure requiring high flow NC, and agitation/delirium.     1. Neuro: Anxiety/Delirium: Continue remeron qhs. continue xanax 0.125mg prn, will dc standing xanax due to increased lethargy/sedation after one dose yesterday. Avoid use of ativan due to intolerance. Seizures: continue Trileptal, last level therapeutic on 1/11. CT brain ordered to evaluate for encephalopathy.   2. CVS: HFpEF: pulm edema on CXR yesterday, s/p Lasix 20mg IV push x1. Will trial diamox today 250mg IVPB for diuresis as well as correction of metabolic alkalosis. HD stable, continue home propranolol dose,   3. Pulm: Acute hypercarbic respiratory failure: ABG improving, pt tolerating bipap. However patient is bipap dependent at this time, unable to wean to NC this am, pt became obtunded. Continue Bipap qhs 12/5 FiO2 30%. CT chest ordered. ILD: s/p pulse dose steroids, tapered to 40mg qd. ESR/CRP elevated, AI workup neg until now pending more markers. Continue Spiriva daily and albuterol nebs prn.  4. GI: Pt with poor oral intake, pending repeat S&S eval. Malnourished. She will need PEG for nutrition, plan discussed with daughter and , awaiting decision. Continue mesalamine and witch hazel for colitis. Continue PPI.  5. Renal: stable kidney function, lytes normal  6. ID: s/p course of Zosyn for PNA, completed 1/14. Increase in White count today, UA, Urine cx, Blood cx x2 sent. Afebrile. Will observe off systemic antibiotics for now. Continue Nystatin Mouthwash for oral thrush  7. Heme: continue lovenox for DVT ppx  8. Endo: no active issues  9. Skin: no lines or avendano  10. Dispo: full code, overall poor prognosis given advanced lung disease.   11. GOC: discussed with Daughter Eva and  Marialuisa, no decision has been made yet relating to PEG/trach if needed. Patient has no Health Care Proxy or Advance Directives. Pt remains a full code. Family requesting transfer to [facility] under care of Dr Hazel. Family explained benefits and risk of transferring patient at this time. Recommended intubating patient however family refusing at this time. Pt is at a high risk of decompensation during transfer while on Bipap. Family understands the risk. All labs sent to [facility]. Radiology Disks to be given to family upon transfer. Plan discussed in detail with Dr Hazel.

## 2020-01-24 VITALS
RESPIRATION RATE: 31 BRPM | DIASTOLIC BLOOD PRESSURE: 75 MMHG | OXYGEN SATURATION: 96 % | SYSTOLIC BLOOD PRESSURE: 122 MMHG | HEART RATE: 100 BPM

## 2020-01-24 LAB
BASE EXCESS BLDA CALC-SCNC: 20 MMOL/L — HIGH (ref -2–2)
BLOOD GAS COMMENTS ARTERIAL: SIGNIFICANT CHANGE UP
CULTURE RESULTS: SIGNIFICANT CHANGE UP
HCO3 BLDA-SCNC: 42 MMOL/L — HIGH (ref 23–27)
HOROWITZ INDEX BLDA+IHG-RTO: 40 — SIGNIFICANT CHANGE UP
PCO2 BLDA: 89 MMHG — CRITICAL HIGH (ref 32–46)
PH BLDA: 7.34 — LOW (ref 7.35–7.45)
PO2 BLDA: 111 MMHG — HIGH (ref 74–108)
SAO2 % BLDA: 98 % — HIGH (ref 92–96)
SPECIMEN SOURCE: SIGNIFICANT CHANGE UP

## 2020-01-24 PROCEDURE — 97112 NEUROMUSCULAR REEDUCATION: CPT

## 2020-01-24 PROCEDURE — 71250 CT THORAX DX C-: CPT

## 2020-01-24 PROCEDURE — 87086 URINE CULTURE/COLONY COUNT: CPT

## 2020-01-24 PROCEDURE — 80048 BASIC METABOLIC PNL TOTAL CA: CPT

## 2020-01-24 PROCEDURE — 97166 OT EVAL MOD COMPLEX 45 MIN: CPT

## 2020-01-24 PROCEDURE — 86036 ANCA SCREEN EACH ANTIBODY: CPT

## 2020-01-24 PROCEDURE — 87449 NOS EACH ORGANISM AG IA: CPT

## 2020-01-24 PROCEDURE — 93306 TTE W/DOPPLER COMPLETE: CPT

## 2020-01-24 PROCEDURE — 83735 ASSAY OF MAGNESIUM: CPT

## 2020-01-24 PROCEDURE — 87507 IADNA-DNA/RNA PROBE TQ 12-25: CPT

## 2020-01-24 PROCEDURE — P9045: CPT

## 2020-01-24 PROCEDURE — 96374 THER/PROPH/DIAG INJ IV PUSH: CPT

## 2020-01-24 PROCEDURE — 85027 COMPLETE CBC AUTOMATED: CPT

## 2020-01-24 PROCEDURE — 94660 CPAP INITIATION&MGMT: CPT

## 2020-01-24 PROCEDURE — 87045 FECES CULTURE AEROBIC BACT: CPT

## 2020-01-24 PROCEDURE — 85652 RBC SED RATE AUTOMATED: CPT

## 2020-01-24 PROCEDURE — 74019 RADEX ABDOMEN 2 VIEWS: CPT

## 2020-01-24 PROCEDURE — 86255 FLUORESCENT ANTIBODY SCREEN: CPT

## 2020-01-24 PROCEDURE — 92526 ORAL FUNCTION THERAPY: CPT

## 2020-01-24 PROCEDURE — 92610 EVALUATE SWALLOWING FUNCTION: CPT

## 2020-01-24 PROCEDURE — 80053 COMPREHEN METABOLIC PANEL: CPT

## 2020-01-24 PROCEDURE — 80076 HEPATIC FUNCTION PANEL: CPT

## 2020-01-24 PROCEDURE — 87040 BLOOD CULTURE FOR BACTERIA: CPT

## 2020-01-24 PROCEDURE — 80183 DRUG SCRN QUANT OXCARBAZEPIN: CPT

## 2020-01-24 PROCEDURE — 99285 EMERGENCY DEPT VISIT HI MDM: CPT | Mod: 25

## 2020-01-24 PROCEDURE — 93970 EXTREMITY STUDY: CPT

## 2020-01-24 PROCEDURE — 87631 RESP VIRUS 3-5 TARGETS: CPT

## 2020-01-24 PROCEDURE — 71045 X-RAY EXAM CHEST 1 VIEW: CPT

## 2020-01-24 PROCEDURE — 82803 BLOOD GASES ANY COMBINATION: CPT

## 2020-01-24 PROCEDURE — 74176 CT ABD & PELVIS W/O CONTRAST: CPT

## 2020-01-24 PROCEDURE — 84100 ASSAY OF PHOSPHORUS: CPT

## 2020-01-24 PROCEDURE — 87798 DETECT AGENT NOS DNA AMP: CPT

## 2020-01-24 PROCEDURE — 87486 CHLMYD PNEUM DNA AMP PROBE: CPT

## 2020-01-24 PROCEDURE — 36600 WITHDRAWAL OF ARTERIAL BLOOD: CPT

## 2020-01-24 PROCEDURE — 93005 ELECTROCARDIOGRAM TRACING: CPT

## 2020-01-24 PROCEDURE — 86200 CCP ANTIBODY: CPT

## 2020-01-24 PROCEDURE — 70450 CT HEAD/BRAIN W/O DYE: CPT

## 2020-01-24 PROCEDURE — 36415 COLL VENOUS BLD VENIPUNCTURE: CPT

## 2020-01-24 PROCEDURE — 87641 MR-STAPH DNA AMP PROBE: CPT

## 2020-01-24 PROCEDURE — 86431 RHEUMATOID FACTOR QUANT: CPT

## 2020-01-24 PROCEDURE — 83605 ASSAY OF LACTIC ACID: CPT

## 2020-01-24 PROCEDURE — 81001 URINALYSIS AUTO W/SCOPE: CPT

## 2020-01-24 PROCEDURE — 97162 PT EVAL MOD COMPLEX 30 MIN: CPT

## 2020-01-24 PROCEDURE — 87633 RESP VIRUS 12-25 TARGETS: CPT

## 2020-01-24 PROCEDURE — 94640 AIRWAY INHALATION TREATMENT: CPT

## 2020-01-24 PROCEDURE — 86235 NUCLEAR ANTIGEN ANTIBODY: CPT

## 2020-01-24 PROCEDURE — 86140 C-REACTIVE PROTEIN: CPT

## 2020-01-24 PROCEDURE — 97116 GAIT TRAINING THERAPY: CPT

## 2020-01-24 PROCEDURE — 84145 PROCALCITONIN (PCT): CPT

## 2020-01-24 PROCEDURE — 97530 THERAPEUTIC ACTIVITIES: CPT

## 2020-01-24 PROCEDURE — 86038 ANTINUCLEAR ANTIBODIES: CPT

## 2020-01-24 PROCEDURE — 87640 STAPH A DNA AMP PROBE: CPT

## 2020-01-24 PROCEDURE — 82962 GLUCOSE BLOOD TEST: CPT

## 2020-01-24 PROCEDURE — 83690 ASSAY OF LIPASE: CPT

## 2020-01-24 PROCEDURE — 87581 M.PNEUMON DNA AMP PROBE: CPT

## 2020-01-24 PROCEDURE — 86225 DNA ANTIBODY NATIVE: CPT

## 2020-01-24 PROCEDURE — 96375 TX/PRO/DX INJ NEW DRUG ADDON: CPT

## 2020-01-24 PROCEDURE — 87046 STOOL CULTR AEROBIC BACT EA: CPT

## 2020-01-24 PROCEDURE — 97110 THERAPEUTIC EXERCISES: CPT

## 2020-01-24 NOTE — DISCHARGE NOTE NURSING/CASE MANAGEMENT/SOCIAL WORK - NSDCPEEMAIL_GEN_ALL_CORE
[facility] for Tobacco Control email [email or web address]

## 2020-01-24 NOTE — PROGRESS NOTE ADULT - REASON FOR ADMISSION
pneumothorax, colitis, UTI

## 2020-01-24 NOTE — DISCHARGE NOTE NURSING/CASE MANAGEMENT/SOCIAL WORK - NSDCPEWEB_GEN_ALL_CORE
[facility] for Tobacco Control website --- [email or web address] website --- [email or web address]

## 2020-01-24 NOTE — PROGRESS NOTE ADULT - SUBJECTIVE AND OBJECTIVE BOX
Patient is a 59y old  Female who presents with a chief complaint of pneumothorax, colitis, UTI (2020 16:03)      BRIEF HOSPITAL COURSE: ***    Events last 24 hours: ***    PAST MEDICAL & SURGICAL HISTORY:  Interstitial lung disease: on home o2 prn  NHL (non-Hodgkin's lymphoma): Agem 43 sp chemo/rt/stem cell  Transient cerebral ischemia, unspecified type  Mitral prolapse  History of tonsillectomy  History of appendectomy    Allergies    IV Contrast (Anaphylaxis)  shellfish. (Anaphylaxis)    Intolerances      FAMILY HISTORY:  Family history of stroke  Family history of breast cancer: Mother      Social History:     Review of Systems:  CONSTITUTIONAL: No fever, chills, or fatigue  EYES: No eye pain, visual disturbances, or discharge  ENMT:  No difficulty hearing, tinnitus, vertigo; No sinus or throat pain  NECK: No pain or stiffness  RESPIRATORY: No cough, wheezing, chills or hemoptysis; No shortness of breath  CARDIOVASCULAR: No chest pain, palpitations, dizziness, or leg swelling  GASTROINTESTINAL: No abdominal or epigastric pain. No nausea, vomiting, or hematemesis; No diarrhea or constipation. No melena or hematochezia.  GENITOURINARY: No dysuria, frequency, hematuria, or incontinence  NEUROLOGICAL: No headaches, memory loss, loss of strength, numbness, or tremors  SKIN: No itching, burning, rashes, or lesions   MUSCULOSKELETAL: No joint pain or swelling; No muscle, back, or extremity pain  PSYCHIATRIC: No depression, anxiety, mood swings, or difficulty sleeping      Vitals During Exam:   HR:   BP:   RR:  sPO2:     Physical Examination:    General: No acute distress.      HEENT: Pupils equal, reactive to light.  Symmetric.    PULM: Clear to auscultation bilaterally, no significant sputum production    CVS: Regular rate and rhythm, no murmurs, rubs, or gallops    ABD: Soft, nondistended, nontender, normoactive bowel sounds, no masses    EXT: No edema, nontender    SKIN: Warm and well perfused, no rashes noted.    NEURO: Alert, oriented, interactive, nonfocal      Medications:  nystatin    Suspension [id number] Unit(s) Swish and Swallow four times a day    propranolol 20 milliGRAM(s) Oral three times a day    ALBUTerol    0.083% 2.5 milliGRAM(s) Nebulizer every 6 hours PRN  tiotropium 18 MICROgram(s) Capsule 1 Capsule(s) Inhalation daily    acetaminophen   Tablet .. 650 milliGRAM(s) Oral every 6 hours PRN  ALPRAZolam 0.125 milliGRAM(s) Oral every 8 hours PRN  mirtazapine Soltab 15 milliGRAM(s) Oral at bedtime  ondansetron Injectable 4 milliGRAM(s) IV Push every 6 hours PRN  OXcarbazepine 300 milliGRAM(s) Oral two times a day      enoxaparin Injectable 40 milliGRAM(s) SubCutaneous daily    mesalamine Suppository 1000 milliGRAM(s) Rectal at bedtime  pantoprazole  Injectable 40 milliGRAM(s) IV Push daily  simethicone 80 milliGRAM(s) Chew two times a day PRN  sucralfate 1 Gram(s) Oral two times a day      predniSONE   Tablet 40 milliGRAM(s) Oral daily      influenza   Vaccine 0.5 milliLiter(s) IntraMuscular once    FIRST- Mouthwash  BLM 5 milliLiter(s) Swish and Spit four times a day  hydrocortisone 2.5% Rectal Cream 1 Application(s) Rectal two times a day  hydrocortisone hemorrhoidal Suppository 1 Suppository(s) Rectal two times a day  lidocaine 2% Gel 1 Application(s) Topical three times a day  witch hazel Pads 1 Application(s) Topical three times a day  zinc oxide 20% Ointment 1 Application(s) Topical two times a day    lactobacillus acidophilus 1 Tablet(s) Oral three times a day with meals          ICU Vital Signs Last 24 Hrs  T(C): 36.4 (2020 00:00), Max: 36.9 (2020 08:00)  T(F): 97.6 (2020 00:00), Max: 98.4 (2020 08:00)  HR: 123 (2020 01:00) (93 - 123)  BP: 120/72 (2020 01:00) (108/68 - 149/78)  BP(mean): 91 (2020 01:00) (83 - 109)  ABP: --  ABP(mean): --  RR: 58 (2020 01:00) (27 - 64)  SpO2: 98% (2020 01:00) (90% - 100%)    Vital Signs Last 24 Hrs  T(C): 36.4 (2020 00:00), Max: 36.9 (2020 08:00)  T(F): 97.6 (2020 00:00), Max: 98.4 (2020 08:00)  HR: 123 (2020 01:00) (93 - 123)  BP: 120/72 (:00) (108/68 - 149/78)  BP(mean): 91 (:) (83 - 109)  RR: 58 (:) (27 - 64)  SpO2: 98% (:00) (90% - 100%)    ABG - ( 2020 01:35 )  pH, Arterial: 7.34  pH, Blood: x     /  pCO2: 89    /  pO2: 111   / HCO3: 42    / Base Excess: 20.0  /  SaO2: 98                  I&O's Detail    2020 07:01  -  2020 07:00  --------------------------------------------------------  IN:    Oral Fluid: 240 mL  Total IN: 240 mL    OUT:  Total OUT: 0 mL    Total NET: 240 mL      2020 07:01  -  2020 01:45  --------------------------------------------------------  IN:    Solution: 300 mL  Total IN: 300 mL    OUT:  Total OUT: 0 mL    Total NET: 300 mL            LABS:                        11.0   19.89 )-----------( 441      ( 2020 05:44 )             36.7     01-23    140  |  88<L>  |  24<H>  ----------------------------<  107<H>  3.4<L>   |  >45<HH>  |  0.53    Ca    9.8      2020 05:44  Phos  2.6     23  Mg     2.0         TPro  6.4  /  Alb  2.8<L>  /  TBili  0.3  /  DBili  x   /  AST  19  /  ALT  26  /  AlkPhos  109            CAPILLARY BLOOD GLUCOSE          Urinalysis Basic - ( 2020 15:49 )    Color: Yellow / Appearance: Slightly Turbid / S.015 / pH: x  Gluc: x / Ketone: Negative  / Bili: Negative / Urobili: Negative   Blood: x / Protein: 30 mg/dL / Nitrite: Negative   Leuk Esterase: Negative / RBC: 25-50 /HPF / WBC 0-2   Sq Epi: x / Non Sq Epi: Occasional / Bacteria: Occasional      CULTURES:        RADIOLOGY:   < from: US Duplex Venous Lower Ext Complete, Bilateral (20 @ 17:05) >  EXAM:  US DPLX LWR EXT VEINS COMPL BI                          PROCEDURE DATE:  2020      INTERPRETATION:  CLINICAL INFORMATION: Shortness of breath, evaluate for DVT    COMPARISON: 2020.    TECHNIQUE: Duplex sonography of the BILATERAL LOWER extremity veins with color and spectral Doppler, with and without compression.      FINDINGS:    There is normal compressibility of the bilateral common femoral, femoral and popliteal veins.     Doppler examination shows normal spontaneous and phasic flow.    No calf vein thrombosis is detected, although evaluation is slightly limited.    IMPRESSION:     No evidence of deep venous thrombosis in either lower extremity.    Evaluation of the calf veins is limited. Consider repeat evaluation in 7-10 days if clinically warranted.      BURT CHACON M.D., ATTENDING RADIOLOGIST  This document has been electronically signed. 2020  5:08PM    < end of copied text >    < from: CT Head No Cont (20 @ 16:56) >  EXAM:  CT BRAIN                            PROCEDURE DATE:  2020      INTERPRETATION:  CLINICAL STATEMENT: Confusion    TECHNIQUE: CT of the head was performed without IV contrast. Some imaging was repeated due to motion    COMPARISON: 2019    FINDINGS:      There is no acute parenchymal hemorrhage, parenchymal mass, mass effect or midline shift. There is no extra-axial fluid collection. There is no acute territorial infarct. There is no hydrocephalus.    The cranium is intact.The visualized paranasal sinuses are well-aerated.    IMPRESSION: Limited by motion.    No acute intracranial hemorrhage or acute territorial infarct.    VJ HASSAN M.D.,ATTENDING RADIOLOGIST  This document has been electronically signed.2020  5:01PM    < end of copied text >    < from: CT Chest No Cont (20 @ 16:55) >  EXAM:  CT CHEST                          PROCEDURE DATE:  2020      INTERPRETATION:  CLINICAL INDICATION: 59 years  Female with SOB.     COMPARISON: Chest CT 2020 and chest x-ray 2020    PROCEDURE:   CT of the Chest was performed without intravenous contrast.  Sagittal and coronal reformats were performed.    FINDINGS:    LUNGS AND AIRWAYS: Patent central airways.  Bilateral reticular opacities are unchanged. Bilateral lower lobe consolidation, slightly improved from the prior study. Mild bilateral lower lobe bronchiectasis unchanged.    PLEURA: There is a small right apical pneumothorax, improved from the prior CT and unchanged from the prior x-ray. There is a minimal left apical pneumothorax, unchanged from the prior chest x-ray of one day prior and new since the prior CT of 2020.    MEDIASTINUM AND IRMA: No lymphadenopathy.    VESSELS: Atherosclerosis without aneurysm.    HEART: Mild cardiomegaly. Small pericardial effusion, slightly improved from the priorstudy.    CHEST WALL AND LOWER NECK: Within normal limits.    VISUALIZED UPPER ABDOMEN: 2.2 cm hepatic cyst unchanged. Mild left hydronephrosis, slightly more pronounced than on the prior study.    BONES: Within normal limits.    IMPRESSION:     Small biapical pneumothoraces, unchanged from prior chest x-ray of 2020. Compared to the prior CT of 120, the right pneumothorax is smaller than the left pneumothorax is new.    Mild left hydronephrosis has developed. Clinically indicated, recommend CT abdomen and pelvis to determine the etiology.    Chronic interstitial lung disease. Bibasilar consolidation slightly improved from 2020. Mild bronchiectasis.    PRISCILA NAZARIO M.D., ATTENDING RADIOLOGIST  This document has been electronically signed. 2020  5:04PM    < end of copied text >      SUPPLEMENTAL O2:   LINES:   IVF:   MARLY:   PPx:   CONTACT: Patient is a 59y old  Female who presents with a chief complaint of pneumothorax, colitis, UTI (2020 16:03)      BRIEF HOSPITAL COURSE:   58 yo f with pmxh ILD on home O2, NHL s/p chemo/radiation/stem cell transplant, CVA with residual left sided weakness (2019), MVP, seizure disorder on Trileptal, chronic SDH vs hygromas admitted initially on  with colitis/proctitis treated with ctx and flagyl; hospital course complicated by HCAP, acute hypercapnic respiratory failure requiring NIV, ICU delirum.     Events last 24 hours:   Patient remains tenuous, RR remains in the 30s, daily ABGs markedly unchanged excluding paO2 improving.  Over the past several days patient with pulmonary edema s/p diuresis with lasix and bumex. Patient prn/qhs BiPAP, now requiring BiPAP for longer amounts of time.  Family requesting transfer to [facility] under Dr. Hazel. Patient endorses minimal improvement in WOB.  ABG 7.34/89/111/42/+20      PAST MEDICAL & SURGICAL HISTORY:  Interstitial lung disease: on home o2 prn  NHL (non-Hodgkin's lymphoma): Agem 43 sp chemo/rt/stem cell  Transient cerebral ischemia, unspecified type  Mitral prolapse  History of tonsillectomy  History of appendectomy    Allergies  IV Contrast (Anaphylaxis)  shellfish. (Anaphylaxis)      FAMILY HISTORY:  Family history of stroke  Family history of breast cancer: Mother      Social History:   From home.       Review of Systems:  All ROS negative except as appreciated above.       Vitals During Exam:   HR: 123  BP: 120/72 mmHg   RR: 35  sPO2: 98% on Fio2 40%.     Physical Examination:    General: Frail, ill appearing female, lying in bed, NAD     HEENT: NC/AT, Pupils 3mm, equal, reactive to light.  Symmetric. BiPAP in place.     PULM: +tachypneic, Coarse and diminished to auscultation bilaterally, no significant sputum production    CVS: Sinus tach, no murmurs, rubs, or gallops appreciated.     ABD: Soft, nondistended, nontender, normoactive bowel sounds, no masses appreciated.     EXT: + edema, nontender    SKIN: Warm and well perfused, no rashes noted.    NEURO: Alert, oriented x1-2; waxing and waning, interactive      Medications:  nystatin    Suspension [id number] Unit(s) Swish and Swallow four times a day  propranolol 20 milliGRAM(s) Oral three times a day  ALBUTerol    0.083% 2.5 milliGRAM(s) Nebulizer every 6 hours PRN  tiotropium 18 MICROgram(s) Capsule 1 Capsule(s) Inhalation daily  acetaminophen   Tablet .. 650 milliGRAM(s) Oral every 6 hours PRN  ALPRAZolam 0.125 milliGRAM(s) Oral every 8 hours PRN  mirtazapine Soltab 15 milliGRAM(s) Oral at bedtime  ondansetron Injectable 4 milliGRAM(s) IV Push every 6 hours PRN  OXcarbazepine 300 milliGRAM(s) Oral two times a day  enoxaparin Injectable 40 milliGRAM(s) SubCutaneous daily  mesalamine Suppository 1000 milliGRAM(s) Rectal at bedtime  pantoprazole  Injectable 40 milliGRAM(s) IV Push daily  simethicone 80 milliGRAM(s) Chew two times a day PRN  sucralfate 1 Gram(s) Oral two times a day  predniSONE   Tablet 40 milliGRAM(s) Oral daily  influenza   Vaccine 0.5 milliLiter(s) IntraMuscular once  FIRST- Mouthwash  BLM 5 milliLiter(s) Swish and Spit four times a day  hydrocortisone 2.5% Rectal Cream 1 Application(s) Rectal two times a day  hydrocortisone hemorrhoidal Suppository 1 Suppository(s) Rectal two times a day  lidocaine 2% Gel 1 Application(s) Topical three times a day  witch hazel Pads 1 Application(s) Topical three times a day  zinc oxide 20% Ointment 1 Application(s) Topical two times a day  lactobacillus acidophilus 1 Tablet(s) Oral three times a day with meals      ICU Vital Signs Last 24 Hrs  T(C): 36.4 (2020 00:00), Max: 36.9 (2020 08:00)  T(F): 97.6 (2020 00:00), Max: 98.4 (2020 08:00)  HR: 123 (2020 01:00) (93 - 123)  BP: 120/72 (2020 01:00) (108/68 - 149/78)  BP(mean): 91 (2020 01:00) (83 - 109)  ABP: --  ABP(mean): --  RR: 58 (2020 01:00) (27 - 64)  SpO2: 98% (:00) (90% - 100%)    Vital Signs Last 24 Hrs  T(C): 36.4 (2020 00:00), Max: 36.9 (2020 08:00)  T(F): 97.6 (2020 00:00), Max: 98.4 (2020 08:00)  HR: 123 (:00) (93 - 123)  BP: 120/72 (:00) (108/68 - 149/78)  BP(mean): 91 (:00) (83 - 109)  RR: 58 (2020 01:00) (27 - 64)  SpO2: 98% (:00) (90% - 100%)    ABG - ( 2020 01:35 )  pH, Arterial: 7.34  pH, Blood: x     /  pCO2: 89    /  pO2: 111   / HCO3: 42    / Base Excess: 20.0  /  SaO2: 98          I&O's Detail    2020 07:01  -  2020 07:00  --------------------------------------------------------  IN:    Oral Fluid: 240 mL  Total IN: 240 mL    OUT:  Total OUT: 0 mL  Total NET: 240 mL      2020 07:01  -  2020 01:45  --------------------------------------------------------  IN:    Solution: 300 mL  Total IN: 300 mL    OUT:  Total OUT: 0 mL  Total NET: 300 mL      LABS:                        11.0   19.89 )-----------( 441      ( 2020 05:44 )             36.7     01-23    140  |  88<L>  |  24<H>  ----------------------------<  107<H>  3.4<L>   |  >45<HH>  |  0.53    Ca    9.8      2020 05:44  Phos  2.6       Mg     2.0         TPro  6.4  /  Alb  2.8<L>  /  TBili  0.3  /  DBili  x   /  AST  19  /  ALT  26  /  AlkPhos  109        Urinalysis Basic - ( 2020 15:49 )  Color: Yellow / Appearance: Slightly Turbid / S.015 / pH: x  Gluc: x / Ketone: Negative  / Bili: Negative / Urobili: Negative   Blood: x / Protein: 30 mg/dL / Nitrite: Negative   Leuk Esterase: Negative / RBC: 25-50 /HPF / WBC 0-2   Sq Epi: x / Non Sq Epi: Occasional / Bacteria: Occasional      CULTURES:  Pending.      RADIOLOGY:   < from: US Duplex Venous Lower Ext Complete, Bilateral (20 @ 17:05) >  EXAM:  US DPLX LWR EXT VEINS COMPL BI                          PROCEDURE DATE:  2020      INTERPRETATION:  CLINICAL INFORMATION: Shortness of breath, evaluate for DVT    COMPARISON: 2020.    TECHNIQUE: Duplex sonography of the BILATERAL LOWER extremity veins with color and spectral Doppler, with and without compression.      FINDINGS:    There is normal compressibility of the bilateral common femoral, femoral and popliteal veins.     Doppler examination shows normal spontaneous and phasic flow.    No calf vein thrombosis is detected, although evaluation is slightly limited.    IMPRESSION:     No evidence of deep venous thrombosis in either lower extremity.    Evaluation of the calf veins is limited. Consider repeat evaluation in 7-10 days if clinically warranted.      BURT CHACNO M.D., ATTENDING RADIOLOGIST  This document has been electronically signed. 2020  5:08PM    < end of copied text >    < from: CT Head No Cont (20 @ 16:56) >  EXAM:  CT BRAIN                            PROCEDURE DATE:  2020      INTERPRETATION:  CLINICAL STATEMENT: Confusion    TECHNIQUE: CT of the head was performed without IV contrast. Some imaging was repeated due to motion    COMPARISON: 2019    FINDINGS:      There is no acute parenchymal hemorrhage, parenchymal mass, mass effect or midline shift. There is no extra-axial fluid collection. There is no acute territorial infarct. There is no hydrocephalus.    The cranium is intact.The visualized paranasal sinuses are well-aerated.    IMPRESSION: Limited by motion.    No acute intracranial hemorrhage or acute territorial infarct.    VJ HASSAN M.D.,ATTENDING RADIOLOGIST  This document has been electronically signed.2020  5:01PM    < end of copied text >    < from: CT Chest No Cont (20 @ 16:55) >  EXAM:  CT CHEST                          PROCEDURE DATE:  2020      INTERPRETATION:  CLINICAL INDICATION: 59 years  Female with SOB.     COMPARISON: Chest CT 2020 and chest x-ray 2020    PROCEDURE:   CT of the Chest was performed without intravenous contrast.  Sagittal and coronal reformats were performed.    FINDINGS:    LUNGS AND AIRWAYS: Patent central airways.  Bilateral reticular opacities are unchanged. Bilateral lower lobe consolidation, slightly improved from the prior study. Mild bilateral lower lobe bronchiectasis unchanged.    PLEURA: There is a small right apical pneumothorax, improved from the prior CT and unchanged from the prior x-ray. There is a minimal left apical pneumothorax, unchanged from the prior chest x-ray of one day prior and new since the prior CT of 2020.    MEDIASTINUM AND IRMA: No lymphadenopathy.    VESSELS: Atherosclerosis without aneurysm.    HEART: Mild cardiomegaly. Small pericardial effusion, slightly improved from the priorstudy.    CHEST WALL AND LOWER NECK: Within normal limits.    VISUALIZED UPPER ABDOMEN: 2.2 cm hepatic cyst unchanged. Mild left hydronephrosis, slightly more pronounced than on the prior study.    BONES: Within normal limits.    IMPRESSION:     Small biapical pneumothoraces, unchanged from prior chest x-ray of 2020. Compared to the prior CT of 120, the right pneumothorax is smaller than the left pneumothorax is new.    Mild left hydronephrosis has developed. Clinically indicated, recommend CT abdomen and pelvis to determine the etiology.    Chronic interstitial lung disease. Bibasilar consolidation slightly improved from 2020. Mild bronchiectasis.    PRISCILA NAZARIO M.D., ATTENDING RADIOLOGIST  This document has been electronically signed. 2020  5:04PM    < end of copied text >      SUPPLEMENTAL O2: BiPAP  LINES: Peripheral   IVF: N  LUKE: N  PPx: Lovenox  CONTACT: N

## 2020-01-24 NOTE — PROVIDER CONTACT NOTE (CRITICAL VALUE NOTIFICATION) - ACTION/TREATMENT ORDERED:
monitor patient
Continue to monitor.
Placed on BIPAP 20/5 30%. Follow up ABG pending.
Pt remains on BIPAP 20/5 30%
Recommends BIPAP mask.
increase IPAP to 18 from 15
No further action taken at this time as per CCPA
Pt placed on BiPap 14/6 @ 40%

## 2020-01-24 NOTE — PROGRESS NOTE ADULT - PROVIDER SPECIALTY LIST ADULT
Critical Care
Gastroenterology
Hospitalist
Infectious Disease
MICU
Neurology
Pulmonology
Thoracic Surgery
Pulmonology

## 2020-01-24 NOTE — PROGRESS NOTE ADULT - ASSESSMENT
60 yo f with pmxh ILD on home O2, NHL s/p chemo/radiation/stem cell transplant, CVA with residual left sided weakness (03/2019), MVP, seizure disorder on Trileptal, chronic SDH vs hygromas admitted initially on 12/26 with colitis/proctitis treated with ctx and flagyl; hospital course complicated by HCAP, acute hypercapnic respiratory failure requiring NIV, ICU delirium.     NEURO: Anxiety/ICU delirium. Standing xanax discontinued yesterday due to concern for lethargy, Mirtazapine continuing. Seizure disorder on oxcarbazepine    CV: HD stable, tachycardia on propanolol, diuresing as tolerated; furosemide transitioned to Acetazolamide due to worsening metabolic alkalosis.   RESP: Hypoxic respiratory failure in setting of ILD, PNA and pulmonary edema, diuresing as tolerated by patient, BIPAP prn and qhs, becoming more dependent on BiPAP. Weaning as tolerated by patient.  Repeat ABG rather unchanged. Patient's respiratory status grossly unchanged over the past few days, patient's family requesting transfer to [facility] under Dr. Hazel.  nebs prn. Patient at high risk for respiratory decompensation, patient's family understands, refusing intubation until absolutely necessary ie crash intubation.  Explained high mortality risk of their decision, they understand and wish to transfer patient on BiPAP.   RENAL: Monitor lytes, replace as needed.   GI: NPO for transfer.   ENDO: No active issues.   ID: colitis/proctitis/PNA/UTI; completed tx; monitoring off abx at this time.  nystatin suspension for oral thrush.   HEME: Lovenox for vte ppx  DISPO: Full code.  Several GOC discussions held with patient's family about patient's advanced lung disease.  They remain optimistic that patient will stabilize and lung disease will improve, remains full code.      Critical Care time: 45 mins assessing presenting problems of acute illness that poses high probability of life threatening deterioration or end organ damage/dysfunction.  Medical decision making including Initiating plan of care, reviewing data, reviewing radiology, discussing with multidisciplinary team, non inclusive of procedures, discussing goals of care with patient/family

## 2020-01-24 NOTE — DISCHARGE NOTE NURSING/CASE MANAGEMENT/SOCIAL WORK - PATIENT PORTAL LINK FT
You can access the FollowMyHealth Patient Portal offered by [facility] by registering at the following website: [email or web address]. By joining [facility]’s FollowMyHealth portal, you will also be able to view your health information using other applications (apps) compatible with our system.

## 2020-01-30 LAB — ANA TITR SER: NEGATIVE — SIGNIFICANT CHANGE UP

## 2020-03-10 ENCOUNTER — INPATIENT (INPATIENT)
Facility: HOSPITAL | Age: 60
LOS: 10 days | DRG: 207 | End: 2020-03-21
Attending: INTERNAL MEDICINE | Admitting: HOSPITALIST
Payer: MEDICARE

## 2020-03-10 VITALS
HEART RATE: 97 BPM | SYSTOLIC BLOOD PRESSURE: 114 MMHG | RESPIRATION RATE: 24 BRPM | OXYGEN SATURATION: 96 % | DIASTOLIC BLOOD PRESSURE: 77 MMHG

## 2020-03-10 DIAGNOSIS — Z90.89 ACQUIRED ABSENCE OF OTHER ORGANS: Chronic | ICD-10-CM

## 2020-03-10 DIAGNOSIS — Z90.49 ACQUIRED ABSENCE OF OTHER SPECIFIED PARTS OF DIGESTIVE TRACT: Chronic | ICD-10-CM

## 2020-03-10 LAB
ALBUMIN SERPL ELPH-MCNC: 3 G/DL — LOW (ref 3.3–5.2)
ALP SERPL-CCNC: 167 U/L — HIGH (ref 40–120)
ALT FLD-CCNC: 53 U/L — HIGH
ANION GAP SERPL CALC-SCNC: 11 MMOL/L — SIGNIFICANT CHANGE UP (ref 5–17)
AST SERPL-CCNC: 46 U/L — HIGH
BASE EXCESS BLDV CALC-SCNC: 18 MMOL/L — HIGH (ref -2–2)
BILIRUB SERPL-MCNC: 0.2 MG/DL — LOW (ref 0.4–2)
BLOOD GAS COMMENTS, VENOUS: 50 — SIGNIFICANT CHANGE UP
BUN SERPL-MCNC: 33 MG/DL — HIGH (ref 8–20)
CA-I SERPL-SCNC: 1.05 MMOL/L — LOW (ref 1.15–1.33)
CALCIUM SERPL-MCNC: 9.9 MG/DL — SIGNIFICANT CHANGE UP (ref 8.6–10.2)
CHLORIDE BLDV-SCNC: 82 MMOL/L — LOW (ref 98–107)
CHLORIDE SERPL-SCNC: 84 MMOL/L — LOW (ref 98–107)
CO2 SERPL-SCNC: 36 MMOL/L — HIGH (ref 22–29)
CREAT SERPL-MCNC: 0.37 MG/DL — LOW (ref 0.5–1.3)
GAS PNL BLDA: SIGNIFICANT CHANGE UP
GAS PNL BLDV: 131 MMOL/L — LOW (ref 135–145)
GAS PNL BLDV: SIGNIFICANT CHANGE UP
GAS PNL BLDV: SIGNIFICANT CHANGE UP
GLUCOSE BLDV-MCNC: 133 MG/DL — HIGH (ref 70–99)
GLUCOSE SERPL-MCNC: 134 MG/DL — HIGH (ref 70–99)
HCO3 BLDV-SCNC: 42 MMOL/L — HIGH (ref 21–29)
HCT VFR BLD CALC: 29.2 % — LOW (ref 34.5–45)
HCT VFR BLDA CALC: 24 — LOW (ref 39–50)
HGB BLD CALC-MCNC: 7.7 G/DL — LOW (ref 11.5–15.5)
HGB BLD-MCNC: 8.9 G/DL — LOW (ref 11.5–15.5)
LACTATE BLDV-MCNC: 1.2 MMOL/L — SIGNIFICANT CHANGE UP (ref 0.5–2)
LIDOCAIN IGE QN: 116 U/L — HIGH (ref 22–51)
MAGNESIUM SERPL-MCNC: 2.2 MG/DL — SIGNIFICANT CHANGE UP (ref 1.6–2.6)
MCHC RBC-ENTMCNC: 27.1 PG — SIGNIFICANT CHANGE UP (ref 27–34)
MCHC RBC-ENTMCNC: 30.5 GM/DL — LOW (ref 32–36)
MCV RBC AUTO: 89 FL — SIGNIFICANT CHANGE UP (ref 80–100)
OTHER CELLS CSF MANUAL: 8 ML/DL — LOW (ref 18–22)
PCO2 BLDV: 69 MMHG — HIGH (ref 35–50)
PH BLDV: 7.42 — SIGNIFICANT CHANGE UP (ref 7.32–7.43)
PLATELET # BLD AUTO: 765 K/UL — HIGH (ref 150–400)
PO2 BLDV: 45 MMHG — SIGNIFICANT CHANGE UP (ref 25–45)
POTASSIUM BLDV-SCNC: 4.5 MMOL/L — SIGNIFICANT CHANGE UP (ref 3.4–4.5)
POTASSIUM SERPL-MCNC: 5.2 MMOL/L — SIGNIFICANT CHANGE UP (ref 3.5–5.3)
POTASSIUM SERPL-SCNC: 5.2 MMOL/L — SIGNIFICANT CHANGE UP (ref 3.5–5.3)
PROT SERPL-MCNC: 6.4 G/DL — LOW (ref 6.6–8.7)
RBC # BLD: 3.28 M/UL — LOW (ref 3.8–5.2)
RBC # FLD: 16.8 % — HIGH (ref 10.3–14.5)
SAO2 % BLDV: 80 % — SIGNIFICANT CHANGE UP
SODIUM SERPL-SCNC: 131 MMOL/L — LOW (ref 135–145)
WBC # BLD: 21.92 K/UL — HIGH (ref 3.8–10.5)
WBC # FLD AUTO: 21.92 K/UL — HIGH (ref 3.8–10.5)

## 2020-03-10 PROCEDURE — 99291 CRITICAL CARE FIRST HOUR: CPT

## 2020-03-10 PROCEDURE — 71045 X-RAY EXAM CHEST 1 VIEW: CPT | Mod: 26

## 2020-03-10 PROCEDURE — 93010 ELECTROCARDIOGRAM REPORT: CPT

## 2020-03-10 RX ORDER — CHLORHEXIDINE GLUCONATE 213 G/1000ML
15 SOLUTION TOPICAL EVERY 12 HOURS
Refills: 0 | Status: DISCONTINUED | OUTPATIENT
Start: 2020-03-10 | End: 2020-03-14

## 2020-03-10 RX ORDER — IPRATROPIUM/ALBUTEROL SULFATE 18-103MCG
3 AEROSOL WITH ADAPTER (GRAM) INHALATION ONCE
Refills: 0 | Status: COMPLETED | OUTPATIENT
Start: 2020-03-10 | End: 2020-03-10

## 2020-03-10 RX ORDER — CHLORHEXIDINE GLUCONATE 213 G/1000ML
15 SOLUTION TOPICAL EVERY 12 HOURS
Refills: 0 | Status: DISCONTINUED | OUTPATIENT
Start: 2020-03-10 | End: 2020-03-10

## 2020-03-10 RX ADMIN — Medication 3 MILLILITER(S): at 22:36

## 2020-03-10 NOTE — ED ADULT NURSE NOTE - CHIEF COMPLAINT QUOTE
pt from [facility] trached and vented,  pt just arrived at [facility] and was switched over to [facility] vent and became unresponsive, pt tachypneic, has audible stridor, retractions, MD Beltran and RT called to bedside, EMS gave 125mg Solumedrol, duo neb, 20LAC

## 2020-03-10 NOTE — ED ADULT NURSE NOTE - NSIMPLEMENTINTERV_GEN_ALL_ED
Implemented All Fall with Harm Risk Interventions:  [place] to call system. Call bell, personal items and telephone within reach. Instruct patient to call for assistance. Room bathroom lighting operational. Non-slip footwear when patient is off stretcher. Physically safe environment: no spills, clutter or unnecessary equipment. Stretcher in lowest position, wheels locked, appropriate side rails in place. Provide visual cue, wrist band, yellow gown, etc. Monitor gait and stability. Monitor for mental status changes and reorient to person, place, and time. Review medications for side effects contributing to fall risk. Reinforce activity limits and safety measures with patient and family. Provide visual clues: red socks.

## 2020-03-10 NOTE — ED PROVIDER NOTE - MUSCULOSKELETAL, MLM
Spine appears normal, range of motion is not limited, no muscle or joint tenderness Spine appears normal.

## 2020-03-10 NOTE — ED PROVIDER NOTE - CLINICAL SUMMARY MEDICAL DECISION MAKING FREE TEXT BOX
Unclear cause of respiratory distress if due to vent mode or mucous plug. Family is no longer happy with pt returning to [facility] due to acute respiratory distress after an hour upon arrival. Will obtain labs, X-ray and likely admit for placement.

## 2020-03-10 NOTE — ED PROVIDER NOTE - PHYSICAL EXAMINATION
[facility]   unresp at [facility] co2 was high around 80 down to 54   5 is nml   125 solumedrol, duoned pta       left [facility] to go to the nursing home plsced on vent and became unresponsive. breathing tx mildly improved.   stable leaving [facility]     interstital lung dx (undiagnosed 2 1/2 years) brought to [place]   for wound care and was admktted   had uti colitis then breathing was worsening placed in icu   bipap  transferred to [facility] needed to be trch and pulm is at [facility]   intubated  for 6 days   trached for 6 weeeks

## 2020-03-10 NOTE — ED PROVIDER NOTE - OBJECTIVE STATEMENT
58 y/o F pt with significant PMHx of interstitial lung disease (undiagnosed 2 1/2 years), Stroke (March 2019) presents to the ED c/o difficulty breathing and unresponsiveness. As per EMS pt received 125mg of Solumedrol and Duoneb PTA.     Pt was initially brought to [facility] ~ 11 weeks ago for wound care. While there they realized she may have underlying problems that need to be addressed so she was admitted. During her admission she had UTI colitis and then her breathing began deteriorating so she was placed in ICU and received BiPAP. Pt was then transferred to [facility] because the pt's pulmonologist is located there and they informed the pt and her family that she may need to be tracheid or intubated. The first day she arrived the pt was intubated for 6 days and then tracheid, which she has had in place for 6 weeks. Today they were on their way back to [facility] and once they reached the facility they went to place the pt on a vent and she began to become unresponsive. 58 y/o F pt with significant PMHx of interstitial lung disease (undiagnosed 2 1/2 years), Stroke (March 2019) presents to the ED c/o difficulty breathing and unresponsiveness. As per EMS pt received 125mg of Solumedrol and Duoneb PTA.     Pt was initially brought to [facility] ~ 11 weeks ago for wound care. While there they realized she may have underlying problems that need to be addressed so she was admitted. During her admission she had UTI colitis and then her breathing began deteriorating so she was placed in ICU and received BiPAP. Pt was then transferred to [facility] because the pt's pulmonologist is located there and they informed the pt and her family that she may need to be tracheid or intubated. The first day she arrived the pt was intubated for 6 days and then tracheid, which she has had in place for 6 weeks. Today they were on their way back to [facility] and once they reached the facility they went to place the pt on a vent and she began to become unresponsive.    History obtained from pt's daughter at the bedside.

## 2020-03-10 NOTE — ED ADULT NURSE NOTE - OBJECTIVE STATEMENT
PT presents to ED for respiratory distress. PT discharged from [facility] today with trach in place which was placed 6 weeks ago in [facility] due to pt unable to be weaned from Vent.  PT discharged from [facility] to [facility].  Developed tachypnea and retractions with stridor heard. Resp at bedside pt placed on vent and cuff inflated. pt improved at this time. labs sent pending results. G tube and avendano cath present on admission. 37 mcg fentanyl patch to right arm present on admission. Family at bedside. CTM

## 2020-03-10 NOTE — ED ADULT TRIAGE NOTE - CHIEF COMPLAINT QUOTE
pt from [facility] trached and vented,  pt just arrived at [facility] and was switched over to [facility] for becoming unresponsive, pt tachypneic, has audible stridor, retractions, MD Beltran and RT called to bedside, EMS gave 125mg Solumedrol, duo neb, 20LAC pt from [facility] trached and vented,  pt just arrived at [facility] and was switched over to [facility] vent and become unresponsive, pt tachypneic, has audible stridor, retractions, MD Beltran and RT called to bedside, EMS gave 125mg Solumedrol, duo neb, 20LAC pt from [facility] trached and vented,  pt just arrived at [facility] and was switched over to [facility] vent and became unresponsive, pt tachypneic, has audible stridor, retractions, MD Beltran and RT called to bedside, EMS gave 125mg Solumedrol, duo neb, 20LAC

## 2020-03-11 DIAGNOSIS — J96.12 CHRONIC RESPIRATORY FAILURE WITH HYPERCAPNIA: ICD-10-CM

## 2020-03-11 DIAGNOSIS — J84.9 INTERSTITIAL PULMONARY DISEASE, UNSPECIFIED: ICD-10-CM

## 2020-03-11 DIAGNOSIS — J96.11 CHRONIC RESPIRATORY FAILURE WITH HYPOXIA: ICD-10-CM

## 2020-03-11 DIAGNOSIS — R06.89 OTHER ABNORMALITIES OF BREATHING: ICD-10-CM

## 2020-03-11 DIAGNOSIS — J93.9 PNEUMOTHORAX, UNSPECIFIED: ICD-10-CM

## 2020-03-11 DIAGNOSIS — R91.8 OTHER NONSPECIFIC ABNORMAL FINDING OF LUNG FIELD: ICD-10-CM

## 2020-03-11 DIAGNOSIS — I82.621 ACUTE EMBOLISM AND THROMBOSIS OF DEEP VEINS OF RIGHT UPPER EXTREMITY: ICD-10-CM

## 2020-03-11 DIAGNOSIS — Z99.11 DEPENDENCE ON RESPIRATOR [VENTILATOR] STATUS: ICD-10-CM

## 2020-03-11 LAB
APPEARANCE UR: ABNORMAL
BACTERIA # UR AUTO: ABNORMAL
BILIRUB UR-MCNC: NEGATIVE — SIGNIFICANT CHANGE UP
COLOR SPEC: YELLOW — SIGNIFICANT CHANGE UP
DIFF PNL FLD: ABNORMAL
EPI CELLS # UR: SIGNIFICANT CHANGE UP
GLUCOSE UR QL: NEGATIVE — SIGNIFICANT CHANGE UP
KETONES UR-MCNC: ABNORMAL
LEUKOCYTE ESTERASE UR-ACNC: ABNORMAL
NITRITE UR-MCNC: POSITIVE
PH UR: 6.5 — SIGNIFICANT CHANGE UP (ref 5–8)
PROCALCITONIN SERPL-MCNC: 0.16 NG/ML — HIGH (ref 0.02–0.1)
PROT UR-MCNC: 100
RAPID RVP RESULT: SIGNIFICANT CHANGE UP
RBC CASTS # UR COMP ASSIST: ABNORMAL /HPF (ref 0–4)
SP GR SPEC: 1.02 — SIGNIFICANT CHANGE UP (ref 1.01–1.02)
TROPONIN T SERPL-MCNC: <0.01 NG/ML — SIGNIFICANT CHANGE UP (ref 0–0.06)
UROBILINOGEN FLD QL: NEGATIVE — SIGNIFICANT CHANGE UP
WBC UR QL: ABNORMAL

## 2020-03-11 PROCEDURE — 99222 1ST HOSP IP/OBS MODERATE 55: CPT

## 2020-03-11 PROCEDURE — 99223 1ST HOSP IP/OBS HIGH 75: CPT

## 2020-03-11 PROCEDURE — 99291 CRITICAL CARE FIRST HOUR: CPT

## 2020-03-11 PROCEDURE — 71250 CT THORAX DX C-: CPT | Mod: 26

## 2020-03-11 PROCEDURE — 93970 EXTREMITY STUDY: CPT | Mod: 26

## 2020-03-11 RX ORDER — CEFTRIAXONE 500 MG/1
1000 INJECTION, POWDER, FOR SOLUTION INTRAMUSCULAR; INTRAVENOUS ONCE
Refills: 0 | Status: COMPLETED | OUTPATIENT
Start: 2020-03-11 | End: 2020-03-11

## 2020-03-11 RX ORDER — FENTANYL CITRATE 50 UG/ML
1 INJECTION INTRAVENOUS
Refills: 0 | Status: DISCONTINUED | OUTPATIENT
Start: 2020-03-11 | End: 2020-03-15

## 2020-03-11 RX ORDER — EPINEPHRINE 0.3 MG/.3ML
3 INJECTION INTRAMUSCULAR; SUBCUTANEOUS
Qty: 0 | Refills: 0 | DISCHARGE

## 2020-03-11 RX ORDER — OXCARBAZEPINE 300 MG/1
600 TABLET, FILM COATED ORAL
Refills: 0 | Status: DISCONTINUED | OUTPATIENT
Start: 2020-03-11 | End: 2020-03-21

## 2020-03-11 RX ORDER — FENTANYL CITRATE 50 UG/ML
1 INJECTION INTRAVENOUS
Qty: 0 | Refills: 0 | DISCHARGE

## 2020-03-11 RX ORDER — QUETIAPINE FUMARATE 200 MG/1
25 TABLET, FILM COATED ORAL AT BEDTIME
Refills: 0 | Status: DISCONTINUED | OUTPATIENT
Start: 2020-03-11 | End: 2020-03-15

## 2020-03-11 RX ORDER — QUETIAPINE FUMARATE 200 MG/1
1 TABLET, FILM COATED ORAL
Qty: 0 | Refills: 0 | DISCHARGE

## 2020-03-11 RX ORDER — SIMETHICONE 80 MG/1
40 TABLET, CHEWABLE ORAL EVERY 6 HOURS
Refills: 0 | Status: DISCONTINUED | OUTPATIENT
Start: 2020-03-11 | End: 2020-03-11

## 2020-03-11 RX ORDER — SUCRALFATE 1 G
10 TABLET ORAL
Qty: 0 | Refills: 0 | DISCHARGE

## 2020-03-11 RX ORDER — COLLAGENASE CLOSTRIDIUM HIST. 250 UNIT/G
1 OINTMENT (GRAM) TOPICAL DAILY
Refills: 0 | Status: DISCONTINUED | OUTPATIENT
Start: 2020-03-11 | End: 2020-03-21

## 2020-03-11 RX ORDER — COLLAGENASE CLOSTRIDIUM HIST. 250 UNIT/G
1 OINTMENT (GRAM) TOPICAL
Qty: 0 | Refills: 0 | DISCHARGE

## 2020-03-11 RX ORDER — POLYETHYLENE GLYCOL 3350 17 G/17G
17 POWDER, FOR SOLUTION ORAL AT BEDTIME
Refills: 0 | Status: DISCONTINUED | OUTPATIENT
Start: 2020-03-11 | End: 2020-03-21

## 2020-03-11 RX ORDER — SIMETHICONE 80 MG/1
0.6 TABLET, CHEWABLE ORAL
Qty: 0 | Refills: 0 | DISCHARGE

## 2020-03-11 RX ORDER — LANOLIN ALCOHOL/MO/W.PET/CERES
5 CREAM (GRAM) TOPICAL AT BEDTIME
Refills: 0 | Status: DISCONTINUED | OUTPATIENT
Start: 2020-03-11 | End: 2020-03-21

## 2020-03-11 RX ORDER — SUCRALFATE 1 G
1 TABLET ORAL EVERY 6 HOURS
Refills: 0 | Status: DISCONTINUED | OUTPATIENT
Start: 2020-03-11 | End: 2020-03-21

## 2020-03-11 RX ORDER — ALBUTEROL 90 UG/1
1.25 AEROSOL, METERED ORAL
Refills: 0 | Status: DISCONTINUED | OUTPATIENT
Start: 2020-03-11 | End: 2020-03-21

## 2020-03-11 RX ORDER — ATOVAQUONE 750 MG/5ML
10 SUSPENSION ORAL
Qty: 0 | Refills: 0 | DISCHARGE

## 2020-03-11 RX ORDER — APIXABAN 2.5 MG/1
5 TABLET, FILM COATED ORAL EVERY 12 HOURS
Refills: 0 | Status: DISCONTINUED | OUTPATIENT
Start: 2020-03-11 | End: 2020-03-12

## 2020-03-11 RX ORDER — SODIUM CHLORIDE 9 MG/ML
2000 INJECTION INTRAMUSCULAR; INTRAVENOUS; SUBCUTANEOUS ONCE
Refills: 0 | Status: COMPLETED | OUTPATIENT
Start: 2020-03-11 | End: 2020-03-11

## 2020-03-11 RX ORDER — ATOVAQUONE 750 MG/5ML
1500 SUSPENSION ORAL DAILY
Refills: 0 | Status: DISCONTINUED | OUTPATIENT
Start: 2020-03-11 | End: 2020-03-21

## 2020-03-11 RX ORDER — ESOMEPRAZOLE MAGNESIUM 40 MG/1
1 CAPSULE, DELAYED RELEASE ORAL
Qty: 0 | Refills: 0 | DISCHARGE

## 2020-03-11 RX ORDER — ACETAMINOPHEN 500 MG
650 TABLET ORAL EVERY 6 HOURS
Refills: 0 | Status: DISCONTINUED | OUTPATIENT
Start: 2020-03-11 | End: 2020-03-21

## 2020-03-11 RX ORDER — PANTOPRAZOLE SODIUM 20 MG/1
40 TABLET, DELAYED RELEASE ORAL
Refills: 0 | Status: DISCONTINUED | OUTPATIENT
Start: 2020-03-11 | End: 2020-03-11

## 2020-03-11 RX ORDER — APIXABAN 2.5 MG/1
1 TABLET, FILM COATED ORAL
Qty: 0 | Refills: 0 | DISCHARGE

## 2020-03-11 RX ORDER — MIRTAZAPINE 45 MG/1
15 TABLET, ORALLY DISINTEGRATING ORAL AT BEDTIME
Refills: 0 | Status: DISCONTINUED | OUTPATIENT
Start: 2020-03-11 | End: 2020-03-15

## 2020-03-11 RX ORDER — RAMELTEON 8 MG
1 TABLET ORAL
Qty: 0 | Refills: 0 | DISCHARGE

## 2020-03-11 RX ORDER — SIMETHICONE 80 MG/1
40 TABLET, CHEWABLE ORAL EVERY 6 HOURS
Refills: 0 | Status: DISCONTINUED | OUTPATIENT
Start: 2020-03-11 | End: 2020-03-21

## 2020-03-11 RX ORDER — SENNA PLUS 8.6 MG/1
10 TABLET ORAL
Qty: 0 | Refills: 0 | DISCHARGE

## 2020-03-11 RX ORDER — DOCUSATE SODIUM AND BENZOCAINE 283; 20 MG/5ML; MG/5ML
1 ENEMA RECTAL
Qty: 0 | Refills: 0 | DISCHARGE

## 2020-03-11 RX ORDER — SUCRALFATE 1 G
100 TABLET ORAL EVERY 6 HOURS
Refills: 0 | Status: DISCONTINUED | OUTPATIENT
Start: 2020-03-11 | End: 2020-03-11

## 2020-03-11 RX ORDER — FAMOTIDINE 10 MG/ML
20 INJECTION INTRAVENOUS DAILY
Refills: 0 | Status: DISCONTINUED | OUTPATIENT
Start: 2020-03-11 | End: 2020-03-21

## 2020-03-11 RX ORDER — MIRTAZAPINE 45 MG/1
1 TABLET, ORALLY DISINTEGRATING ORAL
Qty: 0 | Refills: 0 | DISCHARGE

## 2020-03-11 RX ORDER — SENNA PLUS 8.6 MG/1
10 TABLET ORAL AT BEDTIME
Refills: 0 | Status: DISCONTINUED | OUTPATIENT
Start: 2020-03-11 | End: 2020-03-21

## 2020-03-11 RX ORDER — ACETAMINOPHEN 500 MG
20.3 TABLET ORAL
Qty: 0 | Refills: 0 | DISCHARGE

## 2020-03-11 RX ORDER — POLYETHYLENE GLYCOL 3350 17 G/17G
17 POWDER, FOR SOLUTION ORAL
Qty: 0 | Refills: 0 | DISCHARGE

## 2020-03-11 RX ORDER — OXCARBAZEPINE 300 MG/1
1 TABLET, FILM COATED ORAL
Qty: 0 | Refills: 0 | DISCHARGE

## 2020-03-11 RX ORDER — CEFTRIAXONE 500 MG/1
1000 INJECTION, POWDER, FOR SOLUTION INTRAMUSCULAR; INTRAVENOUS EVERY 24 HOURS
Refills: 0 | Status: DISCONTINUED | OUTPATIENT
Start: 2020-03-11 | End: 2020-03-12

## 2020-03-11 RX ORDER — MIRTAZAPINE 45 MG/1
15 TABLET, ORALLY DISINTEGRATING ORAL AT BEDTIME
Refills: 0 | Status: DISCONTINUED | OUTPATIENT
Start: 2020-03-11 | End: 2020-03-11

## 2020-03-11 RX ADMIN — CEFTRIAXONE 100 MILLIGRAM(S): 500 INJECTION, POWDER, FOR SOLUTION INTRAMUSCULAR; INTRAVENOUS at 08:26

## 2020-03-11 RX ADMIN — SIMETHICONE 40 MILLIGRAM(S): 80 TABLET, CHEWABLE ORAL at 05:48

## 2020-03-11 RX ADMIN — SIMETHICONE 40 MILLIGRAM(S): 80 TABLET, CHEWABLE ORAL at 12:00

## 2020-03-11 RX ADMIN — Medication 650 MILLIGRAM(S): at 20:22

## 2020-03-11 RX ADMIN — Medication 20 MILLIGRAM(S): at 05:48

## 2020-03-11 RX ADMIN — Medication 1 APPLICATION(S): at 12:00

## 2020-03-11 RX ADMIN — FENTANYL CITRATE 1 PATCH: 50 INJECTION INTRAVENOUS at 02:57

## 2020-03-11 RX ADMIN — CHLORHEXIDINE GLUCONATE 15 MILLILITER(S): 213 SOLUTION TOPICAL at 05:49

## 2020-03-11 RX ADMIN — SENNA PLUS 10 MILLILITER(S): 8.6 TABLET ORAL at 21:51

## 2020-03-11 RX ADMIN — APIXABAN 5 MILLIGRAM(S): 2.5 TABLET, FILM COATED ORAL at 17:22

## 2020-03-11 RX ADMIN — Medication 1 GRAM(S): at 05:49

## 2020-03-11 RX ADMIN — OXCARBAZEPINE 600 MILLIGRAM(S): 300 TABLET, FILM COATED ORAL at 17:22

## 2020-03-11 RX ADMIN — OXCARBAZEPINE 600 MILLIGRAM(S): 300 TABLET, FILM COATED ORAL at 05:48

## 2020-03-11 RX ADMIN — Medication 1 GRAM(S): at 17:21

## 2020-03-11 RX ADMIN — CEFTRIAXONE 1000 MILLIGRAM(S): 500 INJECTION, POWDER, FOR SOLUTION INTRAMUSCULAR; INTRAVENOUS at 01:54

## 2020-03-11 RX ADMIN — ATOVAQUONE 1500 MILLIGRAM(S): 750 SUSPENSION ORAL at 12:00

## 2020-03-11 RX ADMIN — CHLORHEXIDINE GLUCONATE 15 MILLILITER(S): 213 SOLUTION TOPICAL at 17:21

## 2020-03-11 RX ADMIN — CEFTRIAXONE 100 MILLIGRAM(S): 500 INJECTION, POWDER, FOR SOLUTION INTRAMUSCULAR; INTRAVENOUS at 00:54

## 2020-03-11 RX ADMIN — Medication 650 MILLIGRAM(S): at 13:01

## 2020-03-11 RX ADMIN — FAMOTIDINE 20 MILLIGRAM(S): 10 INJECTION INTRAVENOUS at 12:00

## 2020-03-11 RX ADMIN — FENTANYL CITRATE 1 PATCH: 50 INJECTION INTRAVENOUS at 08:13

## 2020-03-11 RX ADMIN — SODIUM CHLORIDE 2000 MILLILITER(S): 9 INJECTION INTRAMUSCULAR; INTRAVENOUS; SUBCUTANEOUS at 00:54

## 2020-03-11 RX ADMIN — Medication 1 GRAM(S): at 12:00

## 2020-03-11 RX ADMIN — Medication 650 MILLIGRAM(S): at 21:38

## 2020-03-11 RX ADMIN — APIXABAN 5 MILLIGRAM(S): 2.5 TABLET, FILM COATED ORAL at 05:49

## 2020-03-11 RX ADMIN — Medication 650 MILLIGRAM(S): at 12:01

## 2020-03-11 RX ADMIN — FENTANYL CITRATE 1 PATCH: 50 INJECTION INTRAVENOUS at 21:36

## 2020-03-11 RX ADMIN — FENTANYL CITRATE 1 PATCH: 50 INJECTION INTRAVENOUS at 21:37

## 2020-03-11 RX ADMIN — SIMETHICONE 40 MILLIGRAM(S): 80 TABLET, CHEWABLE ORAL at 17:21

## 2020-03-11 NOTE — H&P ADULT - HISTORY OF PRESENT ILLNESS
This is a 58 y/o female with history of severe restrictive interstitial lung disease, s/p resp failure s/p trach 1/29, ventilator dependent, s/p PEA arrest (1/29), RLE DVT and RUE occlusive thrombus, toxic metabolic encephalopathy, severe sepsis secondary to serratia PNA, unspecified pleural/parenchymal bronchiectatic process with pulmonary nodules for which she has opposed steroid therapy (prior), spontaneous right-sided PTX (1/19), stage Peg NHL (s/p chemo/XRT and SCT received '03 at [facility] - in remission) 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 Patient was discharged to [facility], on the day of arrival to [facility], from [facility]. Upon arrival to [facility] pt was placed on volume control ventilation at which time she immediately desaturated, became unresponsive and in acute respiratory distress.  history obtained from patient daughter at bedside and patient chart. Family is currently [facility] that [facility]

## 2020-03-11 NOTE — H&P ADULT - NSHPREVIEWOFSYSTEMS_GEN_ALL_CORE
unable to obtain due to patient current medical condition, ventilator dependent, reports no pain, c/o dry mouth limited to obtained ROS due to patient current medical condition, ventilator dependent, reports no pain, c/o dry mouth

## 2020-03-11 NOTE — CONSULT NOTE ADULT - PROBLEM SELECTOR RECOMMENDATION 9
Small right pneumothorax.  Currently on Eliquis.  Dr. Cowan to review CT scan. Small right pneumothorax.  Currently on Eliquis.  CT scan reviewed.    No intervention from a CTs standpoint.  Discussed with Dr. Kaiser

## 2020-03-11 NOTE — H&P ADULT - NSHPPHYSICALEXAM_GEN_ALL_CORE
Constitutional/General: Well developed, well nourished, vitals reviewed  EYE: PERRL, conjunctiva clear  ENT: Good dentition, oropharynx clear  NECK: No masses, thyroid normal  RESP: tach - diminished, no wheezes, + rhonchi, normal effort  CV: RRR, normal S1S2, no murmur, 2+ DP pulses, no JVD, no edema  ABDOMEN: Soft, nontender, no HSM; + PEG, Villatoro  LYMPH: No cervical or supraclavicular adenopathy  SKIN: Warm, dry, no rashes: unstageable sacral ulcer  PSYCHIATRIC: Oriented x1, normal mood and affect

## 2020-03-11 NOTE — CONSULT NOTE ADULT - SUBJECTIVE AND OBJECTIVE BOX
PULMONARY CONSULT NOTE      WANDER ROMIEPHOENIX-[id number]    Patient is a 59y old  Female who presents with a chief complaint of Acute respiratory distress (11 Mar 2020 02:33)  60 yo f with pmxh ILD on home O2, NHL s/p chemo/radiation/stem cell transplant, CVA with residual left sided weakness (2019), MVP, seizure disorder on Trileptal, chronic SDH vs hygromas. Recently admitted at [facility] for hypoxic respiratory failure 2/2 worsening ILD and HCAP, transfer to [facility] at family's request. While at [facility] required intubation and ended up with trach and PEG.    Events last 24 hours: Discharged from [facility] about 24 hours ago to [facility]. While at [facility] had an episode of unresponsiveness while on the ventilator. Turns out patient was placed on Volume A/C at [facility] when she needs to be on pressure control for ventilation. SUspect patient given her ILD and restrictive lungs when placed on volume A/C developed barotrauma from high pressure which lead to hypercapnia and unresponsiveness. Taken to the ER emergently and placed on pressure control with improvement in symptoms. ABG in ER: 7.42/69/152/41. Patient seen and examined at the bedside along with close family members. Family states her mental status is at baseline and confirms the story above. Patient is alert and able to follow commands, offer no complaints at this time. Family requesting admission for new placement.         HISTORY OF PRESENT ILLNESS:  resting on vent NAD  no chest or abd pain  MEDICATIONS  (STANDING):  apixaban 5 milliGRAM(s) Oral every 12 hours  atovaquone Suspension 1500 milliGRAM(s) Oral daily  cefTRIAXone   IVPB 1000 milliGRAM(s) IV Intermittent every 24 hours  chlorhexidine 0.12% Liquid 15 milliLiter(s) Oral Mucosa every 12 hours  collagenase Ointment 1 Application(s) Topical daily  famotidine    Tablet 20 milliGRAM(s) Oral daily  fentaNYL   Patch  12 MICROgram(s)/Hr 1 Patch Transdermal every 72 hours  fentaNYL   Patch  25 MICROgram(s)/Hr 1 Patch Transdermal every 72 hours  mirtazapine 15 milliGRAM(s) Oral at bedtime  OXcarbazepine 600 milliGRAM(s) Oral two times a day  polyethylene glycol 3350 17 Gram(s) Oral at bedtime  predniSONE   Tablet 20 milliGRAM(s) Oral daily  QUEtiapine 25 milliGRAM(s) Oral at bedtime  senna Syrup 10 milliLiter(s) Oral at bedtime  simethicone drops 40 milliGRAM(s) Oral every 6 hours  sucralfate suspension 1 Gram(s) Oral every 6 hours      MEDICATIONS  (PRN):  acetaminophen    Suspension .. 650 milliGRAM(s) Oral every 6 hours PRN Temp greater or equal to 38C (100.4F), Mild Pain (1 - 3)  ALBUTerol   0.042% 1.25 milliGRAM(s) Nebulizer four times a day PRN Shortness of Breath and/or Wheezing  aluminum hydroxide/magnesium hydroxide/simethicone Suspension 30 milliLiter(s) Oral every 4 hours PRN Dyspepsia  melatonin 5 milliGRAM(s) Oral at bedtime PRN Insomnia      Allergies    IV Contrast (Anaphylaxis)  shellfish. (Anaphylaxis)    Intolerances    lorazepam (Other (Severe))      PAST MEDICAL & SURGICAL HISTORY:  Interstitial lung disease: on home o2 prn  NHL (non-Hodgkin's lymphoma): Agem 43 sp chemo/rt/stem cell  Transient cerebral ischemia, unspecified type  Mitral prolapse  History of tonsillectomy  History of appendectomy      FAMILY HISTORY:  Family history of stroke  Family history of breast cancer: Mother      SOCIAL HISTORY  Smoking History:     REVIEW OF SYSTEMS:    CONSTITUTIONAL:  No fevers, chills, sweats    HEENT:  Eyes:  No diplopia or blurred vision. ENT:  No earache, sore throat or runny nose.    CARDIOVASCULAR:  No pressure, squeezing, tightness, or heaviness about the chest; no palpitations.    RESPIRATORY:  see HPI    GASTROINTESTINAL:  No abdominal pain, nausea, vomiting or diarrhea.    GENITOURINARY:  No dysuria, frequency or urgency.    NEUROLOGIC:  No paresthesias, fasciculations, seizures or weakness.    PSYCHIATRIC:  No disorder of thought or mood.    Vital Signs Last 24 Hrs  T(C): 36.6 (11 Mar 2020 08:00), Max: 36.9 (11 Mar 2020 00:56)  T(F): 97.9 (11 Mar 2020 08:00), Max: 98.4 (11 Mar 2020 00:56)  HR: 89 (11 Mar 2020 10:00) (89 - 100)  BP: 108/66 (11 Mar 2020 10:00) (97/59 - 121/76)  BP(mean): 82 (11 Mar 2020 10:00) (74 - 82)  RR: 20 (11 Mar 2020 10:00) (20 - 26)  SpO2: 100% (11 Mar 2020 10:00) (88% - 100%)    PHYSICAL EXAMINATION:    GENERAL: The patient is anxious _in no apparent distress.     HEENT: Head is normocephalic and atraumatic. Extraocular muscles are intact. Mucous membranes are moist.     NECK: Supple.     LUNGS: moderate air entry bilat without wheezing, rales, or rhonchi. Respirations unlabored    HEART: Regular rate and rhythm without murmur.    ABDOMEN: Soft, nontender, and nondistended.  No hepatosplenomegaly is noted.    EXTREMITIES: Without any cyanosis, clubbing, rash, lesions or edema.    NEUROLOGIC: L hemiparesis      LABS:                        8.9    21.92 )-----------( 765      ( 10 Mar 2020 23:15 )             29.2     03-10    131<L>  |  84<L>  |  33.0<H>  ----------------------------<  134<H>  5.2   |  36.0<H>  |  0.37<L>    Ca    9.9      10 Mar 2020 23:15  Mg     2.2     03-10    TPro  6.4<L>  /  Alb  3.0<L>  /  TBili  0.2<L>  /  DBili  x   /  AST  46<H>  /  ALT  53<H>  /  AlkPhos  167<H>  03-10      Urinalysis Basic - ( 11 Mar 2020 00:16 )    Color: Yellow / Appearance: Cloudy / S.020 / pH: x  Gluc: x / Ketone: Trace  / Bili: Negative / Urobili: Negative   Blood: x / Protein: 100 / Nitrite: Positive   Leuk Esterase: Moderate / RBC: 25-50 /HPF / WBC 6-10   Sq Epi: x / Non Sq Epi: Occasional / Bacteria: Occasional      ABG - ( 10 Mar 2020 22:29 )  pH, Arterial: 7.42  pH, Blood: x     /  pCO2: 69    /  pO2: 152   / HCO3: 41    / Base Excess: 17.2  /  SaO2: >100              CARDIAC MARKERS ( 10 Mar 2020 23:15 )  x     / <0.01 ng/mL / x     / x     / x                    MICROBIOLOGY:    RADIOLOGY & ADDITIONAL STUDIES:  CXr and Ct scan reviewed

## 2020-03-11 NOTE — CONSULT NOTE ADULT - SUBJECTIVE AND OBJECTIVE BOX
Surgeon: Chapo    Consult requesting by: Leona    HISTORY OF PRESENT ILLNESS:  59y Female with PMH Stage Peg Non hodgkin's lymphoma (now in remission), CVA with residual right sided weakness (), MVP, and intersitial lung disease, s/p PEA arrest 20> now vent dependant and  s/p trach and peg a few weeks ago, RLE DVT and RUE occlusive thrombus (On eliquis),  unspecified pleural/parenchymal bronchiectatic process with pulmonary nodules for which she has opposed steroid therapy (prior), spontaneous right-sided PTX (), seizure disorder, Meniere's disease, GERD,  Severe malnutrition related to chronic disease (cancer and Pulm disease). After the trach and peg placement, patient was discharged to [facility].  Upon arrival to [facility] pt was placed on volume control ventilation at which time she immediately desaturated, became unresponsive and in acute respiratory distress. She was then transferred to [facility].   CT chest today with small right sided pneumothorax.  CTS consulted for evaluation.   She is also being treated for UTI.    Pt currently in MICU on vent.  Family at bedside ( and mother).        PAST MEDICAL & SURGICAL HISTORY:  Interstitial lung disease: on home o2 prn  NHL (non-Hodgkin's lymphoma): Agem 43 sp chemo/rt/stem cell  Transient cerebral ischemia, unspecified type  Mitral prolapse  History of tonsillectomy  History of appendectomy      MEDICATIONS  (STANDING):  apixaban 5 milliGRAM(s) Oral every 12 hours  atovaquone Suspension 1500 milliGRAM(s) Oral daily  cefTRIAXone   IVPB 1000 milliGRAM(s) IV Intermittent every 24 hours  chlorhexidine 0.12% Liquid 15 milliLiter(s) Oral Mucosa every 12 hours  collagenase Ointment 1 Application(s) Topical daily  famotidine    Tablet 20 milliGRAM(s) Oral daily  fentaNYL   Patch  12 MICROgram(s)/Hr 1 Patch Transdermal every 72 hours  fentaNYL   Patch  25 MICROgram(s)/Hr 1 Patch Transdermal every 72 hours  mirtazapine 15 milliGRAM(s) Oral at bedtime  OXcarbazepine 600 milliGRAM(s) Oral two times a day  polyethylene glycol 3350 17 Gram(s) Oral at bedtime  predniSONE   Tablet 20 milliGRAM(s) Oral daily  QUEtiapine 25 milliGRAM(s) Oral at bedtime  senna Syrup 10 milliLiter(s) Oral at bedtime  simethicone drops 40 milliGRAM(s) Oral every 6 hours  sucralfate suspension 1 Gram(s) Oral every 6 hours    MEDICATIONS  (PRN):  acetaminophen    Suspension .. 650 milliGRAM(s) Oral every 6 hours PRN Temp greater or equal to 38C (100.4F), Mild Pain (1 - 3)  ALBUTerol   0.042% 1.25 milliGRAM(s) Nebulizer four times a day PRN Shortness of Breath and/or Wheezing  aluminum hydroxide/magnesium hydroxide/simethicone Suspension 30 milliLiter(s) Oral every 4 hours PRN Dyspepsia  melatonin 5 milliGRAM(s) Oral at bedtime PRN Insomnia    Antiplatelet therapy:                           Last dose/amt:    Allergies    IV Contrast (Anaphylaxis)  shellfish. (Anaphylaxis)    Intolerances    lorazepam (Other (Severe))      SOCIAL HISTORY:  Smoker: [ ] Yes  [ x] No        PACK YEARS:                         WHEN QUIT?  ETOH use: [ ] Yes  [x ] No              FREQUENCY / QUANTITY:  Ilicit Drug use:  [ ] Yes  [ x] No  Occupation: none  Live with: facility    FAMILY HISTORY:  Family history of stroke  Family history of breast cancer: Mother      Review of Systems  CONSTITUTIONAL:  Fevers[ ] chills[ ] sweats[ ] fatigue[ ] weight loss[ ] weight gain [ ]                                     NEGATIVE [ x]   NEURO:  parathesias[x ] seizures [ ]  syncope [ ]  confusion [ ]                                                                                NEGATIVE[ ]   EYES: glasses[ ]  blurry vision[ ]  discharge[ ] pain[ ] glaucoma [ ]                                                                          NEGATIVE[x ]   ENMT:  difficulty hearing [ ]  vertigo[ ]  dysphagia[ ] epistaxis[ ] recent dental work [ ]                                    NEGATIVE[ x]   CV:  chest pain[ ] palpitations[ ] DARNELL [ ] diaphoresis [ ]                                                                                           NEGATIVE[ x]   RESPIRATORY:  wheezing[ ] SOB[ ] cough [ ] sputum[ ] hemoptysis[ ]   trach to vent                                                 NEGATIVE[ ]   GI:  nausea[ ]  vommiting [ ]  diarrhea[ ] constipation [ ] melena [ ]                                                                      NEGATIVE[x ]   : hematuria[ ]  dysuria[ ] urgency[ ] incontinence[ ]                                                                                            NEGATIVEx[ ]   MUSKULOSKELETAL:  arthritis[ ]  joint swelling [ ] muscle weakness [ ]                                                                NEGATIVE[x ]   SKIN/BREAST:  rash[ ] itching [ ]  hair loss[ ] masses[ ]                                                                                              NEGATIVE[x ]   PSYCH:  dementia [ ] depresion [ ] anxiety[ ]                                                                                                               NEGATIVE[x ]   HEME/LYMPH:  bruises easily[ ] enlarged lymph nodes[ ] tender lymph nodes[ ]                                               NEGATIVE[x ]   ENDOCRINE:  cold intolerance[ ] heat intolerance[ ] polydipsia[ ]                                                                          NEGATIVE[x ]     PHYSICAL EXAM  Vital Signs Last 24 Hrs  T(C): 36.5 (11 Mar 2020 11:40), Max: 36.9 (11 Mar 2020 00:56)  T(F): 97.7 (11 Mar 2020 11:40), Max: 98.4 (11 Mar 2020 00:56)  HR: 94 (11 Mar 2020 15:00) (88 - 100)  BP: 107/57 (11 Mar 2020 15:00) (97/59 - 121/76)  BP(mean): 76 (11 Mar 2020 15:) (74 - 82)  RR: 20 (11 Mar 2020 15:) (20 - 29)  SpO2: 100% (11 Mar 2020 15:) (96% - 100%) on vent     CONSTITUTIONAL:                                                                       Neuro: WNL[ ] Normal exam oriented to person/place & time with no focal motor or sensory  deficits. Other Awake and alert.                    Eyes: WNL[x ]   Normal exam of conjunctiva & lids, pupils equally reactive. Other     ENT: WNL[x ]    Normal exam of nasal/oral mucosa with absence of cyanosis. Other  Neck: WNL[x ]  Normal exam of jugular veins, trachea & thyroid. Other  Chest: WNL[ ] Normal lung exam with good air movement absence of wheezes, rales, or rhonchi: Other Diminished with scattered rhonchi                                                                               CV:  Auscultation: normal [ ] S3[ ] S4[ ] Irregular [ ] Rub[ ] Clicks[ ]    Murmurs none:[ x]systolic [ ]  diastolic [ ] holosystolic [ ]  Carotids: No Bruits[ x] Other                 Abdominal Aorta: normal [x ] nonpalpable[ ]Other                                                                                      GI:           WNL[x ] Normal exam of abdomen, liver & spleen with no noted masses or tenderness. Other  + peg tube to tube feeds                                                                                                      Extremities: WNL[x ] Normal no evidence of cyanosis or deformity Edema: none[ ]trace[ ]1+[ ]2+[ ]3+[ ]4+[ ]  Lower Extremity Pulses: Right[pp ] Left[ pp]  SKIN :WNL[ x] Normal exam to inspection & palation. Other:                                                          LABS:                        8.9    21.92 )-----------( 765      ( 10 Mar 2020 23:15 )             29.2     03-10    131<L>  |  84<L>  |  33.0<H>  ----------------------------<  134<H>  5.2   |  36.0<H>  |  0.37<L>    Ca    9.9      10 Mar 2020 23:15  Mg     2.2     03-10    TPro  6.4<L>  /  Alb  3.0<L>  /  TBili  0.2<L>  /  DBili  x   /  AST  46<H>  /  ALT  53<H>  /  AlkPhos  167<H>  03-10      Urinalysis Basic - ( 11 Mar 2020 00:16 )    Color: Yellow / Appearance: Cloudy / S.020 / pH: x  Gluc: x / Ketone: Trace  / Bili: Negative / Urobili: Negative   Blood: x / Protein: 100 / Nitrite: Positive   Leuk Esterase: Moderate / RBC: 25-50 /HPF / WBC 6-10   Sq Epi: x / Non Sq Epi: Occasional / Bacteria: Occasional      CARDIAC MARKERS ( 10 Mar 2020 23:15 )  x     / <0.01 ng/mL / x     / x     / x              < from: CT Chest No Cont (20 @ 14:50) >     EXAM:  CT CHEST                          PROCEDURE DATE:  2020          INTERPRETATION:  CLINICAL INFORMATION: Respiratory failure, difficulty breathing    COMPARISON: Multiple prior CTs of the chest with most recent of 2020, 2020, 2019    PROCEDURE:   CT of the Chest was performed without intravenous contrast.  Sagittal and coronal reformats were performed.      FINDINGS:    LUNGS AND AIRWAYS: Tracheostomy is visualized. Air is dilated proximal trachea. Diffuse bronchiectasis. Scattered areas of interlobular and intralobular septal thickening with peripheral subpleural cystic change compatible with pulmonary fibrosis. Additional scattered groundglass alveolar opacities in the lower greater than upper lobes may represent acute on chronic fibrosis and/or mild superimposed pulmonary edema.    PLEURA: Trace to small right pleural effusion. Small left pleural effusion. Biapical pleural parenchymal scarring with associated pleural thickening and/or pleural fluid at the apices. Small right-sided pneumothorax. No left-sided pneumothorax.    MEDIASTINUM AND IRMA: No lymphadenopathy.    VESSELS: Within normal limits.    HEART: CT findings in the heart consistent with anemia. No pericardial effusion.    CHEST WALL AND LOWER NECK: Within normal limits.    VISUALIZED UPPER ABDOMEN: Elevation of the right hemidiaphragm. Medial right hepatic cyst.    BONES: Degenerative changes.    IMPRESSION:     Small right-sided pneumothorax.    Pulmonary fibrosis with marked pleural parenchymal scarring with additional scattered groundglass alveolar opacities and bilateral pleural effusions suggesting superimposed pulmonary edema. Additional aspect of acute on chronic fibrosis cannot be excluded.    Tracheostomy visualized with adilated proximal trachea around the tracheostomy.    NATHANIEL ECHEVARRIA M.D., ATTENDING RADIOLOGIST  This document has been electronically signed. Mar 11 2020  3:18PM    < end of copied text >

## 2020-03-11 NOTE — CONSULT NOTE ADULT - SUBJECTIVE AND OBJECTIVE BOX
Patient is a 59y old  Female who presents with a chief complaint of     BRIEF HOSPITAL COURSE: 58 yo f with pmxh ILD on home O2, NHL s/p chemo/radiation/stem cell transplant, CVA with residual left sided weakness (2019), MVP, seizure disorder on Trileptal, chronic SDH vs hygromas. Recently admitted at [facility] for hypoxic respiratory failure 2/2 worsening ILD and HCAP, transfer to [facility] at family's request. While at [facility] required intubation and ended up with trach and PEG.    Events last 24 hours: Discharged from [facility] about 24 hours ago to [facility]. While at [facility] had an episode of unresponsiveness while on the ventilator. Turns out patient was placed on Volume A/C at [facility] when she needs to be on pressure control for ventilation. SUspect patient given her ILD and restrictive lungs when placed on volume A/C developed barotrauma from high pressure which lead to hypercapnia and unresponsiveness. Taken to the ER emergently and placed on pressure control with improvement in symptoms. ABG in ER: 7.42//152/41. Patient seen and examined at the bedside along with close family members. Family states her mental status is at baseline and confirms the story above. Patient is alert and able to follow commands, offer no complaints at this time. Family requesting admission for new placement.     PAST MEDICAL & SURGICAL HISTORY:  Interstitial lung disease: on home o2 prn  NHL (non-Hodgkin's lymphoma): Agem 43 sp chemo/rt/stem cell  Transient cerebral ischemia, unspecified type  Mitral prolapse  History of tonsillectomy  History of appendectomy      Review of Systems:  CONSTITUTIONAL: No fever, chills, or fatigue  EYES: No eye pain, visual disturbances, or discharge  ENMT:  No difficulty hearing, tinnitus, vertigo; No sinus or throat pain  NECK: No pain or stiffness  RESPIRATORY: No cough, wheezing, chills or hemoptysis; No shortness of breath  CARDIOVASCULAR: No chest pain, palpitations, dizziness, or leg swelling  GASTROINTESTINAL: No abdominal or epigastric pain. No nausea, vomiting, or hematemesis; No diarrhea or constipation. No melena or hematochezia.  GENITOURINARY: No dysuria, frequency, hematuria, or incontinence  NEUROLOGICAL: No headaches, memory loss, loss of strength, numbness, or tremors  SKIN: No itching, burning, rashes, or lesions   MUSCULOSKELETAL: No joint pain or swelling; No muscle, back, or extremity pain  PSYCHIATRIC: No depression, anxiety, mood swings, or difficulty sleeping      Medications:  atovaquone Suspension 1500 milliGRAM(s) Oral daily  ALBUTerol   0.042% 1.25 milliGRAM(s) Nebulizer four times a day PRN  acetaminophen    Suspension .. 650 milliGRAM(s) Oral every 6 hours PRN  fentaNYL   Patch  12 MICROgram(s)/Hr 1 Patch Transdermal every 72 hours  fentaNYL   Patch  25 MICROgram(s)/Hr 1 Patch Transdermal every 72 hours  melatonin 5 milliGRAM(s) Oral at bedtime PRN  mirtazapine 15 milliGRAM(s) Oral at bedtime  OXcarbazepine 600 milliGRAM(s) Oral two times a day  QUEtiapine 25 milliGRAM(s) Oral at bedtime  apixaban 5 milliGRAM(s) Oral every 12 hours  aluminum hydroxide/magnesium hydroxide/simethicone Suspension 30 milliLiter(s) Oral every 4 hours PRN  famotidine    Tablet 20 milliGRAM(s) Oral daily  polyethylene glycol 3350 17 Gram(s) Oral at bedtime  senna Syrup 10 milliLiter(s) Oral at bedtime  simethicone drops 40 milliGRAM(s) Oral every 6 hours  sucralfate suspension 1 Gram(s) Oral every 6 hours  predniSONE   Tablet 20 milliGRAM(s) Oral daily  chlorhexidine 0.12% Liquid 15 milliLiter(s) Oral Mucosa every 12 hours  collagenase Ointment 1 Application(s) Topical daily      Mode: AC/ CMV (Assist Control/ Continuous Mandatory Ventilation)  RR (machine): 20  FiO2: 40  ITime: 0.8  MAP: 12  PC: 24  PIP: 35      ICU Vital Signs Last 24 Hrs  T(C): 36.9 (11 Mar 2020 02:45), Max: 36.9 (11 Mar 2020 00:56)  T(F): 98.4 (11 Mar 2020 02:45), Max: 98.4 (11 Mar 2020 00:56)  HR: 100 (11 Mar 2020 02:45) (95 - 100)  BP: 109/65 (11 Mar 2020 02:45) (109/65 - 121/76)  RR: 26 (11 Mar 2020 02:45) (24 - 26)  SpO2: 100% (11 Mar 2020 02:45) (96% - 100%)      ABG - ( 10 Mar 2020 22:29 )  pH, Arterial: 7.42  pH, Blood: x     /  pCO2: 69    /  pO2: 152   / HCO3: 41    / Base Excess: 17.2  /  SaO2: >100          I&O's Detail        LABS:                        8.9    21.92 )-----------( 765      ( 10 Mar 2020 23:15 )             29.2     03-10    131<L>  |  84<L>  |  33.0<H>  ----------------------------<  134<H>  5.2   |  36.0<H>  |  0.37<L>    Ca    9.9      10 Mar 2020 23:15  Mg     2.2     03-10    TPro  6.4<L>  /  Alb  3.0<L>  /  TBili  0.2<L>  /  DBili  x   /  AST  46<H>  /  ALT  53<H>  /  AlkPhos  167<H>  03-10      CARDIAC MARKERS ( 10 Mar 2020 23:15 )  x     / <0.01 ng/mL / x     / x     / x          CAPILLARY BLOOD GLUCOSE          Urinalysis Basic - ( 11 Mar 2020 00:16 )    Color: Yellow / Appearance: Cloudy / S.020 / pH: x  Gluc: x / Ketone: Trace  / Bili: Negative / Urobili: Negative   Blood: x / Protein: 100 / Nitrite: Positive   Leuk Esterase: Moderate / RBC: 25-50 /HPF / WBC 6-10   Sq Epi: x / Non Sq Epi: Occasional / Bacteria: Occasional      CULTURES:      Physical Examination:    General: No acute distress.  Alert, oriented, interactive, nonfocal, trach to vent     HEENT: Pupils equal, reactive to light.  Symmetric.    PULM: Clear to auscultation bilaterally, no significant sputum production    CVS: Regular rate and rhythm, no murmurs, rubs, or gallops    ABD: Soft, nondistended, nontender, normoactive bowel sounds, no masses, PEG site appears clean    EXT: No edema, nontender    SKIN: Warm and well perfused, no rashes noted.    NEURO: A&O, diffuse weakness, no focal deficits       RADIOLOGY: CXR with intersitial changes, no focal consolidation       TIME SPENT: 40 min   Evaluating/treating patient, reviewing data/labs/imaging, discussing case with multidisciplinary team, discussing plan/goals of care with patient/family. Non-inclusive of procedure time.

## 2020-03-11 NOTE — H&P ADULT - ASSESSMENT
Acute respiratory distress as patient was placed on volume ventilation instead of Pressure control ventilation, as per [facility] respiratory therapist: patient was placed on , 20, 50%, peep 5, as per the therapist [facility] is not able to use pressure control in their facility.   - after being placed back on prior ventilator settings Pressure control 30/5, RR 20, 40%. Patient respiratory distress resolved. Family has requested that patient not be returned to [facility] at this time.   - admit to medical unit with monitor and  for at least 24hrs, patient has history of intermittent tachycardic with recent h/o of cardiac arrest   - Pulmonary consult - svc notified  - duoneb  - cont ventilator: PC 30, peep 5, 40%.   - Pulmonary toilet as needed  - ICU consult appreciated  - Daily trach care  - cont prednisone 20mg for ILD with Atovaquone for PCP prophylaxis   - / consult - for discharge arrangement for a NH than can ventilate patient with use of pressure control ventilation.  ****AVOID BENZODIAZEPINES, if possible had a paradoxical reaction to ativan.   - cont fentanyl 37mcg patch Q72hrs  - Aspiration and fall precautions  - Bedrest  - elevated head of bed.     Abnormal UA, consistent with UTI, awaiting Urine culture, chronic indwelling avendano present on arrival, changed on arrival.   - IV Rocephin   - f/u Urine Culture    Right Brachial Vein Thrombosis & Left Femoral DVT, present on arrival   - cont Eliquis    Severe Malnutrition   - cont TF: Nepro @45ml/hr for 24hrs  - nutrition consult    h/o constipation, cont bowel regimen  h/o Sacrum/coccyx pressure ulcer, present on admit  - cont daily wound care, cont Santyl    advance directives: FULL CODE

## 2020-03-12 LAB
ANION GAP SERPL CALC-SCNC: 9 MMOL/L — SIGNIFICANT CHANGE UP (ref 5–17)
BUN SERPL-MCNC: 27 MG/DL — HIGH (ref 8–20)
CALCIUM SERPL-MCNC: 9.2 MG/DL — SIGNIFICANT CHANGE UP (ref 8.6–10.2)
CHLORIDE SERPL-SCNC: 87 MMOL/L — LOW (ref 98–107)
CO2 SERPL-SCNC: 35 MMOL/L — HIGH (ref 22–29)
CREAT SERPL-MCNC: 0.32 MG/DL — LOW (ref 0.5–1.3)
CULTURE RESULTS: SIGNIFICANT CHANGE UP
GLUCOSE SERPL-MCNC: 157 MG/DL — HIGH (ref 70–99)
GRAM STN FLD: SIGNIFICANT CHANGE UP
HCT VFR BLD CALC: 23.5 % — LOW (ref 34.5–45)
HCT VFR BLD CALC: 24.3 % — LOW (ref 34.5–45)
HGB BLD-MCNC: 7.2 G/DL — LOW (ref 11.5–15.5)
HGB BLD-MCNC: 7.3 G/DL — LOW (ref 11.5–15.5)
MCHC RBC-ENTMCNC: 26.8 PG — LOW (ref 27–34)
MCHC RBC-ENTMCNC: 27.4 PG — SIGNIFICANT CHANGE UP (ref 27–34)
MCHC RBC-ENTMCNC: 30 GM/DL — LOW (ref 32–36)
MCHC RBC-ENTMCNC: 30.6 GM/DL — LOW (ref 32–36)
MCV RBC AUTO: 89.3 FL — SIGNIFICANT CHANGE UP (ref 80–100)
MCV RBC AUTO: 89.4 FL — SIGNIFICANT CHANGE UP (ref 80–100)
NT-PROBNP SERPL-SCNC: 885 PG/ML — HIGH (ref 0–300)
OB PNL STL: NEGATIVE — SIGNIFICANT CHANGE UP
PLATELET # BLD AUTO: 677 K/UL — HIGH (ref 150–400)
PLATELET # BLD AUTO: 712 K/UL — HIGH (ref 150–400)
POTASSIUM SERPL-MCNC: 3.8 MMOL/L — SIGNIFICANT CHANGE UP (ref 3.5–5.3)
POTASSIUM SERPL-SCNC: 3.8 MMOL/L — SIGNIFICANT CHANGE UP (ref 3.5–5.3)
RBC # BLD: 2.63 M/UL — LOW (ref 3.8–5.2)
RBC # BLD: 2.72 M/UL — LOW (ref 3.8–5.2)
RBC # FLD: 17 % — HIGH (ref 10.3–14.5)
RBC # FLD: 17.1 % — HIGH (ref 10.3–14.5)
SODIUM SERPL-SCNC: 131 MMOL/L — LOW (ref 135–145)
SPECIMEN SOURCE: SIGNIFICANT CHANGE UP
SPECIMEN SOURCE: SIGNIFICANT CHANGE UP
WBC # BLD: 18.69 K/UL — HIGH (ref 3.8–10.5)
WBC # BLD: 18.85 K/UL — HIGH (ref 3.8–10.5)
WBC # FLD AUTO: 18.69 K/UL — HIGH (ref 3.8–10.5)
WBC # FLD AUTO: 18.85 K/UL — HIGH (ref 3.8–10.5)

## 2020-03-12 PROCEDURE — 99223 1ST HOSP IP/OBS HIGH 75: CPT

## 2020-03-12 PROCEDURE — 93970 EXTREMITY STUDY: CPT | Mod: 26

## 2020-03-12 PROCEDURE — 99232 SBSQ HOSP IP/OBS MODERATE 35: CPT

## 2020-03-12 PROCEDURE — 71045 X-RAY EXAM CHEST 1 VIEW: CPT | Mod: 26

## 2020-03-12 PROCEDURE — 99233 SBSQ HOSP IP/OBS HIGH 50: CPT

## 2020-03-12 RX ORDER — ENOXAPARIN SODIUM 100 MG/ML
40 INJECTION SUBCUTANEOUS DAILY
Refills: 0 | Status: DISCONTINUED | OUTPATIENT
Start: 2020-03-13 | End: 2020-03-21

## 2020-03-12 RX ADMIN — Medication 1 GRAM(S): at 00:34

## 2020-03-12 RX ADMIN — Medication 1 GRAM(S): at 17:18

## 2020-03-12 RX ADMIN — OXCARBAZEPINE 600 MILLIGRAM(S): 300 TABLET, FILM COATED ORAL at 05:01

## 2020-03-12 RX ADMIN — FENTANYL CITRATE 1 PATCH: 50 INJECTION INTRAVENOUS at 18:43

## 2020-03-12 RX ADMIN — APIXABAN 5 MILLIGRAM(S): 2.5 TABLET, FILM COATED ORAL at 05:01

## 2020-03-12 RX ADMIN — CHLORHEXIDINE GLUCONATE 15 MILLILITER(S): 213 SOLUTION TOPICAL at 05:01

## 2020-03-12 RX ADMIN — Medication 1 GRAM(S): at 11:04

## 2020-03-12 RX ADMIN — Medication 1 APPLICATION(S): at 11:04

## 2020-03-12 RX ADMIN — SIMETHICONE 40 MILLIGRAM(S): 80 TABLET, CHEWABLE ORAL at 00:34

## 2020-03-12 RX ADMIN — Medication 650 MILLIGRAM(S): at 17:25

## 2020-03-12 RX ADMIN — Medication 1 GRAM(S): at 05:01

## 2020-03-12 RX ADMIN — Medication 650 MILLIGRAM(S): at 05:41

## 2020-03-12 RX ADMIN — FENTANYL CITRATE 1 PATCH: 50 INJECTION INTRAVENOUS at 05:42

## 2020-03-12 RX ADMIN — FENTANYL CITRATE 1 PATCH: 50 INJECTION INTRAVENOUS at 05:41

## 2020-03-12 RX ADMIN — FAMOTIDINE 20 MILLIGRAM(S): 10 INJECTION INTRAVENOUS at 11:04

## 2020-03-12 RX ADMIN — Medication 650 MILLIGRAM(S): at 18:25

## 2020-03-12 RX ADMIN — OXCARBAZEPINE 600 MILLIGRAM(S): 300 TABLET, FILM COATED ORAL at 17:18

## 2020-03-12 RX ADMIN — ATOVAQUONE 1500 MILLIGRAM(S): 750 SUSPENSION ORAL at 11:04

## 2020-03-12 RX ADMIN — Medication 650 MILLIGRAM(S): at 05:01

## 2020-03-12 RX ADMIN — SIMETHICONE 40 MILLIGRAM(S): 80 TABLET, CHEWABLE ORAL at 11:04

## 2020-03-12 RX ADMIN — CEFTRIAXONE 100 MILLIGRAM(S): 500 INJECTION, POWDER, FOR SOLUTION INTRAMUSCULAR; INTRAVENOUS at 07:58

## 2020-03-12 RX ADMIN — SIMETHICONE 40 MILLIGRAM(S): 80 TABLET, CHEWABLE ORAL at 05:01

## 2020-03-12 RX ADMIN — Medication 20 MILLIGRAM(S): at 05:01

## 2020-03-12 RX ADMIN — CHLORHEXIDINE GLUCONATE 15 MILLILITER(S): 213 SOLUTION TOPICAL at 17:18

## 2020-03-12 NOTE — PROVIDER CONTACT NOTE (OTHER) - ASSESSMENT
Afebrile  Denies pain  Compliant with ventilator, no apparent respiratory distress   Appears calm and comfortable  Urine output 30ml/hr

## 2020-03-12 NOTE — DIETITIAN INITIAL EVALUATION ADULT. - ENTERAL
change tube feeds to Jevity 1.5, goal rate of 60 ml/hr (x20 hrs) to provide 1200 ml, 1800 kcal, 77g protein, 917 ml free water, and >100% of RDIs for vitamins/minerals. Add Prostat 30 ml once daily to better meet pts estimated needs (tube feeds + supplement will provide 1900 kcal and 92g protein daily). Additional free water per MD discretion.

## 2020-03-12 NOTE — CONSULT NOTE ADULT - SUBJECTIVE AND OBJECTIVE BOX
[facility]  INFECTIOUS DISEASES AND INTERNAL MEDICINE at [place]  =======================================================  Chandrakant Honeycutt MD  Diplomates American Board of Internal Medicine and Infectious Diseases  Tel: [phone]      Fax: [phone]  =======================================================      [id number]  ROMIE VIRAMONTES is a 59y  Female     CC: Patient is a 59y old  Female who presents with a chief complaint of Acute respiratory distress (12 Mar 2020 13:13)    HPI:  This is a 58 y/o female with history of severe restrictive interstitial lung disease, s/p resp failure s/p trach , ventilator dependent, s/p PEA arrest (), RLE DVT and RUE occlusive thrombus, toxic metabolic encephalopathy, severe sepsis secondary to serratia PNA, unspecified pleural/parenchymal bronchiectatic process with pulmonary nodules for which she has opposed steroid therapy (prior), spontaneous right-sided PTX (), stage Peg NHL (s/p chemo/XRT and SCT received ' at [facility] - in remission) 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 Patient was discharged to [facility], on the day of arrival to [facility], from [facility]. Upon arrival to [facility] pt was placed on volume control ventilation at which time she immediately desaturated, became unresponsive and in acute respiratory distress.  history obtained from patient daughter at bedside and patient chart. Family is currently requesting that patient not return to [facility]. (11 Mar 2020 02:33)      PAST MEDICAL & SURGICAL HISTORY:  Interstitial lung disease: on home o2 prn  NHL (non-Hodgkin's lymphoma): Agem 43 sp chemo/rt/stem cell  Transient cerebral ischemia, unspecified type  Mitral prolapse  History of tonsillectomy  History of appendectomy      Social Hx:    FAMILY HISTORY:  Family history of stroke  Family history of breast cancer: Mother      Allergies    IV Contrast (Anaphylaxis)  shellfish. (Anaphylaxis)    Intolerances    lorazepam (Other (Severe))      MEDICATIONS  (STANDING):  atovaquone Suspension 1500 milliGRAM(s) Oral daily  cefTRIAXone   IVPB 1000 milliGRAM(s) IV Intermittent every 24 hours  chlorhexidine 0.12% Liquid 15 milliLiter(s) Oral Mucosa every 12 hours  collagenase Ointment 1 Application(s) Topical daily  enoxaparin Injectable 40 milliGRAM(s) SubCutaneous daily  famotidine    Tablet 20 milliGRAM(s) Oral daily  fentaNYL   Patch  12 MICROgram(s)/Hr 1 Patch Transdermal every 72 hours  fentaNYL   Patch  25 MICROgram(s)/Hr 1 Patch Transdermal every 72 hours  mirtazapine 15 milliGRAM(s) Oral at bedtime  OXcarbazepine 600 milliGRAM(s) Oral two times a day  polyethylene glycol 3350 17 Gram(s) Oral at bedtime  predniSONE   Tablet 20 milliGRAM(s) Oral daily  QUEtiapine 25 milliGRAM(s) Oral at bedtime  senna Syrup 10 milliLiter(s) Oral at bedtime  sucralfate suspension 1 Gram(s) Oral every 6 hours    MEDICATIONS  (PRN):  acetaminophen    Suspension .. 650 milliGRAM(s) Oral every 6 hours PRN Temp greater or equal to 38C (100.4F), Mild Pain (1 - 3)  ALBUTerol   0.042% 1.25 milliGRAM(s) Nebulizer four times a day PRN Shortness of Breath and/or Wheezing  aluminum hydroxide/magnesium hydroxide/simethicone Suspension 30 milliLiter(s) Oral every 4 hours PRN Dyspepsia  melatonin 5 milliGRAM(s) Oral at bedtime PRN Insomnia  simethicone drops 40 milliGRAM(s) Oral every 6 hours PRN Gas      ANTIMICROBIALS:  atovaquone Suspension 1500 daily  cefTRIAXone   IVPB 1000 every 24 hours      OTHER MEDS: MEDICATIONS  (STANDING):  acetaminophen    Suspension .. 650 every 6 hours PRN  ALBUTerol   0.042% 1.25 four times a day PRN  aluminum hydroxide/magnesium hydroxide/simethicone Suspension 30 every 4 hours PRN  enoxaparin Injectable 40 daily  famotidine    Tablet 20 daily  fentaNYL   Patch  12 MICROgram(s)/Hr 1 every 72 hours  fentaNYL   Patch  25 MICROgram(s)/Hr 1 every 72 hours  melatonin 5 at bedtime PRN  mirtazapine 15 at bedtime  OXcarbazepine 600 two times a day  polyethylene glycol 3350 17 at bedtime  predniSONE   Tablet 20 daily  QUEtiapine 25 at bedtime  senna Syrup 10 at bedtime  simethicone drops 40 every 6 hours PRN  sucralfate suspension 1 every 6 hours             REVIEW OF SYSTEMS:  CONSTITUTIONAL:  No Fever or chills  HEENT:  No diplopia or blurred vision.  No earache, sore throat or runny nose.  CARDIOVASCULAR:  No pressure, squeezing, strangling, tightness, heaviness or aching about the chest, neck, axilla or epigastrium.  RESPIRATORY:  No cough, shortness of breath  GASTROINTESTINAL:  No nausea, vomiting or diarrhea.  GENITOURINARY:  No dysuria, frequency or urgency. No Blood in urine  MUSCULOSKELETAL:  no joint aches, no muscle pain  SKIN:  No change in skin, hair or nails.  NEUROLOGIC:  No Headaches, seizures or weakness.  PSYCHIATRIC:  No disorder of thought or mood.  ENDOCRINE:  No heat or cold intolerance  HEMATOLOGICAL:  No easy bruising or bleeding.           I&O's Detail    11 Mar 2020 07:  -  12 Mar 2020 07:00  --------------------------------------------------------  IN:    Enteral Tube Flush: 820 mL    Nepro: 855 mL    Solution: 50 mL  Total IN: 1725 mL    OUT:    Indwelling Catheter - Urethral: 1475 mL  Total OUT: 1475 mL    Total NET: 250 mL      12 Mar 2020 07:01  -  12 Mar 2020 15:13  --------------------------------------------------------  IN:    Enteral Tube Flush: 60 mL    Nepro: 225 mL    Solution: 50 mL  Total IN: 335 mL    OUT:    Indwelling Catheter - Urethral: 175 mL  Total OUT: 175 mL    Total NET: 160 mL            Physical Exam:  Vital Signs Last 24 Hrs  T(C): 36.6 (12 Mar 2020 12:00), Max: 36.8 (12 Mar 2020 05:00)  T(F): 97.9 (12 Mar 2020 12:00), Max: 98.2 (12 Mar 2020 05:00)  HR: 112 (12 Mar 2020 12:24) (98 - 125)  BP: 105/58 (12 Mar 2020 12:00) (94/50 - 114/59)  BP(mean): 74 (12 Mar 2020 12:) (60 - 83)  RR: 27 (12 Mar 2020 12:) (12 - 37)  SpO2: 99% (12 Mar 2020 12:24) (98% - 100%)  Height (cm): 175.3 ( @ 04:00)  Weight (kg): 61.8 ( @ 04:00)  BMI (kg/m2): 20.1 ( @ 04:00)  BSA (m2): 1.76 ( @ 04:00)  GEN: NAD, pleasant  HEENT: normocephalic and atraumatic. EOMI. PERRL.  Anicteric  NECK: Supple.   LUNGS: Clear to auscultation.  HEART: Regular rate and rhythm without murmur.  ABDOMEN: Soft, nontender, and nondistended.  Positive bowel sounds.    : No CVA tenderness  EXTREMITIES: Without any edema.  MSK: No joint swelling  NEUROLOGIC: Cranial nerves II through XII are grossly intact. No Focal Deficits  PSYCHIATRIC: Appropriate affect .  SKIN: No Rash        Labs:      131<L>  |  87<L>  |  27.0<H>  ----------------------------<  157<H>  3.8   |  35.0<H>  |  0.32<L>    Ca    9.2      12 Mar 2020 06:01  Mg     2.2     03-10    TPro  6.4<L>  /  Alb  3.0<L>  /  TBili  0.2<L>  /  DBili  x   /  AST  46<H>  /  ALT  53<H>  /  AlkPhos  167<H>  03-10                          7.3    18.85 )-----------( 677      ( 12 Mar 2020 06:01 )             24.3         Urinalysis Basic - ( 11 Mar 2020 00:16 )    Color: Yellow / Appearance: Cloudy / S.020 / pH: x  Gluc: x / Ketone: Trace  / Bili: Negative / Urobili: Negative   Blood: x / Protein: 100 / Nitrite: Positive   Leuk Esterase: Moderate / RBC: 25-50 /HPF / WBC 6-10   Sq Epi: x / Non Sq Epi: Occasional / Bacteria: Occasional      LIVER FUNCTIONS - ( 10 Mar 2020 23:15 )  Alb: 3.0 g/dL / Pro: 6.4 g/dL / ALK PHOS: 167 U/L / ALT: 53 U/L / AST: 46 U/L / GGT: x           CARDIAC MARKERS ( 10 Mar 2020 23:15 )  x     / <0.01 ng/mL / x     / x     / x          CAPILLARY BLOOD GLUCOSE        ABG - ( 10 Mar 2020 22:29 )  pH, Arterial: 7.42  pH, Blood: x     /  pCO2: 69    /  pO2: 152   / HCO3: 41    / Base Excess: 17.2  /  SaO2: >100                RECENT CULTURES:   @ 08:58 .Urine     <10,000 CFU/mL Normal Urogenital Meme        03-10 @ 23:13          [facility]        Radiology: [facility]  INFECTIOUS DISEASES AND INTERNAL MEDICINE at [place]  =======================================================  Chandrakant Honeycutt MD  Diplomates American Board of Internal Medicine and Infectious Diseases  Tel: [phone]      Fax: [phone]  =======================================================      [id number]  ROMIE VIRAMONTES is a 59y  Female     CC: Patient is a 59y old  Female who presents with a chief complaint of Acute respiratory distress (12 Mar 2020 13:13)    HPI:  This is a 58 y/o female with history of severe restrictive interstitial lung disease, s/p resp failure s/p trach , ventilator dependent, s/p PEA arrest (), RLE DVT and RUE occlusive thrombus, toxic metabolic encephalopathy, severe sepsis secondary to serratia PNA, unspecified pleural/parenchymal bronchiectatic process with pulmonary nodules for which she has opposed steroid therapy (prior), spontaneous right-sided PTX (), stage Peg NHL (s/p chemo/XRT and SCT received ' at [facility] - in remission) 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 Patient was discharged to [facility], on the day of arrival to [facility], from [facility]. Upon arrival to [facility] pt was placed on volume control ventilation at which time she immediately desaturated, became unresponsive and in acute respiratory distress.  history obtained from patient daughter at bedside and patient chart. Family is currently requesting that patient not return to [facility]. (11 Mar 2020 02:33)      PAST MEDICAL & SURGICAL HISTORY:  Interstitial lung disease: on home o2 prn  NHL (non-Hodgkin's lymphoma): Agem 43 sp chemo/rt/stem cell  Transient cerebral ischemia, unspecified type  Mitral prolapse  History of tonsillectomy  History of appendectomy      Social Hx: non smoker    FAMILY HISTORY:  Family history of stroke  Family history of breast cancer: Mother      Allergies    IV Contrast (Anaphylaxis)  shellfish. (Anaphylaxis)    Intolerances    lorazepam (Other (Severe))      MEDICATIONS  (STANDING):  atovaquone Suspension 1500 milliGRAM(s) Oral daily  cefTRIAXone   IVPB 1000 milliGRAM(s) IV Intermittent every 24 hours  chlorhexidine 0.12% Liquid 15 milliLiter(s) Oral Mucosa every 12 hours  collagenase Ointment 1 Application(s) Topical daily  enoxaparin Injectable 40 milliGRAM(s) SubCutaneous daily  famotidine    Tablet 20 milliGRAM(s) Oral daily  fentaNYL   Patch  12 MICROgram(s)/Hr 1 Patch Transdermal every 72 hours  fentaNYL   Patch  25 MICROgram(s)/Hr 1 Patch Transdermal every 72 hours  mirtazapine 15 milliGRAM(s) Oral at bedtime  OXcarbazepine 600 milliGRAM(s) Oral two times a day  polyethylene glycol 3350 17 Gram(s) Oral at bedtime  predniSONE   Tablet 20 milliGRAM(s) Oral daily  QUEtiapine 25 milliGRAM(s) Oral at bedtime  senna Syrup 10 milliLiter(s) Oral at bedtime  sucralfate suspension 1 Gram(s) Oral every 6 hours    MEDICATIONS  (PRN):  acetaminophen    Suspension .. 650 milliGRAM(s) Oral every 6 hours PRN Temp greater or equal to 38C (100.4F), Mild Pain (1 - 3)  ALBUTerol   0.042% 1.25 milliGRAM(s) Nebulizer four times a day PRN Shortness of Breath and/or Wheezing  aluminum hydroxide/magnesium hydroxide/simethicone Suspension 30 milliLiter(s) Oral every 4 hours PRN Dyspepsia  melatonin 5 milliGRAM(s) Oral at bedtime PRN Insomnia  simethicone drops 40 milliGRAM(s) Oral every 6 hours PRN Gas      ANTIMICROBIALS:  atovaquone Suspension 1500 daily  cefTRIAXone   IVPB 1000 every 24 hours      OTHER MEDS: MEDICATIONS  (STANDING):  acetaminophen    Suspension .. 650 every 6 hours PRN  ALBUTerol   0.042% 1.25 four times a day PRN  aluminum hydroxide/magnesium hydroxide/simethicone Suspension 30 every 4 hours PRN  enoxaparin Injectable 40 daily  famotidine    Tablet 20 daily  fentaNYL   Patch  12 MICROgram(s)/Hr 1 every 72 hours  fentaNYL   Patch  25 MICROgram(s)/Hr 1 every 72 hours  melatonin 5 at bedtime PRN  mirtazapine 15 at bedtime  OXcarbazepine 600 two times a day  polyethylene glycol 3350 17 at bedtime  predniSONE   Tablet 20 daily  QUEtiapine 25 at bedtime  senna Syrup 10 at bedtime  simethicone drops 40 every 6 hours PRN  sucralfate suspension 1 every 6 hours             REVIEW OF SYSTEMS:  CONSTITUTIONAL:  No Fever or chills  HEENT:  No diplopia or blurred vision.  No earache, sore throat or runny nose.  CARDIOVASCULAR:  No pressure, squeezing, strangling, tightness, heaviness or aching about the chest, neck, axilla or epigastrium.  RESPIRATORY:  No cough, shortness of breath  GASTROINTESTINAL:  No nausea, vomiting or diarrhea. +constipation  GENITOURINARY:  No dysuria, frequency or urgency. No Blood in urine  MUSCULOSKELETAL:  no joint aches, no muscle pain  SKIN:  No change in skin, hair or nails.  NEUROLOGIC:  No Headaches, seizures or weakness.  PSYCHIATRIC:  No disorder of thought or mood.  ENDOCRINE:  No heat or cold intolerance  HEMATOLOGICAL:  No easy bruising or bleeding.           I&O's Detail    11 Mar 2020 07:  -  12 Mar 2020 07:00  --------------------------------------------------------  IN:    Enteral Tube Flush: 820 mL    Nepro: 855 mL    Solution: 50 mL  Total IN: 1725 mL    OUT:    Indwelling Catheter - Urethral: 1475 mL  Total OUT: 1475 mL    Total NET: 250 mL      12 Mar 2020 07:01  -  12 Mar 2020 15:13  --------------------------------------------------------  IN:    Enteral Tube Flush: 60 mL    Nepro: 225 mL    Solution: 50 mL  Total IN: 335 mL    OUT:    Indwelling Catheter - Urethral: 175 mL  Total OUT: 175 mL    Total NET: 160 mL            Physical Exam:  Vital Signs Last 24 Hrs  T(C): 36.6 (12 Mar 2020 12:00), Max: 36.8 (12 Mar 2020 05:00)  T(F): 97.9 (12 Mar 2020 12:00), Max: 98.2 (12 Mar 2020 05:00)  HR: 112 (12 Mar 2020 12:24) (98 - 125)  BP: 105/58 (12 Mar 2020 12:) (94/50 - 114/59)  BP(mean): 74 (12 Mar 2020 12:) (60 - 83)  RR: 27 (12 Mar 2020 12:) (12 - 37)  SpO2: 99% (12 Mar 2020 12:) (98% - 100%)  Height (cm): 175.3 ( @ 04:00)  Weight (kg): 61.8 ( @ 04:00)  BMI (kg/m2): 20.1 ( @ 04:00)  BSA (m2): 1.76 ( @ 04:00)  GEN: NAD, anxious + trach  HEENT: normocephalic and atraumatic. EOMI. PERRL.  Anicteric  NECK: Supple.   LUNGS: Clear to auscultation.  HEART: Regular rate and rhythm without murmur.  ABDOMEN: Soft, nontender, + pegand nondistended.  Positive bowel sounds.    : No CVA tenderness + avendano  EXTREMITIES: Without any edema.  MSK: No joint swelling  NEUROLOGIC: Cranial nerves II through XII are grossly intact. No Focal Deficits  PSYCHIATRIC: Appropriate affect .  SKIN: No Rash        Labs:      131<L>  |  87<L>  |  27.0<H>  ----------------------------<  157<H>  3.8   |  35.0<H>  |  0.32<L>    Ca    9.2      12 Mar 2020 06:01  Mg     2.2     03-10    TPro  6.4<L>  /  Alb  3.0<L>  /  TBili  0.2<L>  /  DBili  x   /  AST  46<H>  /  ALT  53<H>  /  AlkPhos  167<H>  03-10                          7.3    18.85 )-----------( 677      ( 12 Mar 2020 06:01 )             24.3         Urinalysis Basic - ( 11 Mar 2020 00:16 )    Color: Yellow / Appearance: Cloudy / S.020 / pH: x  Gluc: x / Ketone: Trace  / Bili: Negative / Urobili: Negative   Blood: x / Protein: 100 / Nitrite: Positive   Leuk Esterase: Moderate / RBC: 25-50 /HPF / WBC 6-10   Sq Epi: x / Non Sq Epi: Occasional / Bacteria: Occasional      LIVER FUNCTIONS - ( 10 Mar 2020 23:15 )  Alb: 3.0 g/dL / Pro: 6.4 g/dL / ALK PHOS: 167 U/L / ALT: 53 U/L / AST: 46 U/L / GGT: x           CARDIAC MARKERS ( 10 Mar 2020 23:15 )  x     / <0.01 ng/mL / x     / x     / x          CAPILLARY BLOOD GLUCOSE        ABG - ( 10 Mar 2020 22:29 )  pH, Arterial: 7.42  pH, Blood: x     /  pCO2: 69    /  pO2: 152   / HCO3: 41    / Base Excess: 17.2  /  SaO2: >100                RECENT CULTURES:   @ 08:58 .Urine     <10,000 CFU/mL Normal Urogenital Meme        03-10 @ 23:13          [facility]        Radiology:  < from: US Duplex Venous Lower Ext Complete, Bilateral (20 @ 13:36) >  FINDINGS:    There is normal compressibility of the bilateral common femoral, femoral and popliteal veins.     Doppler examination shows normal spontaneous and phasic flow.    No calf vein thrombosis is detected.    IMPRESSION:     No evidence of deep venous thrombosis in either lower extremity.      < end of copied text >    < from: Xray Chest 1 View- PORTABLE-Urgent (20 @ 10:07) >    FINDINGS: There is a persistent RIGHT apical lung 10% pneumothorax without midline shift.  Chronic interstitial lung lung disease unchanged. Please see CT scan 3/11/2020 for further assessment..  There is mild coronary megaly.. Visualized osseous structures are intact.    Tracheostomy tube in place    IMPRESSION:   Stable RIGHT apical 10% pneumothorax. Chronic interstitial lung disease. Tracheostomy tube in place..      < end of copied text >    < from: CT Chest No Cont (20 @ 14:50) >  IMPRESSION:     Small right-sided pneumothorax.    Pulmonary fibrosis with marked pleural parenchymal scarring with additional scattered groundglass alveolar opacities and bilateral pleural effusions suggesting superimposed pulmonary edema. Additional aspect of acute on chronic fibrosis cannot be excluded.    Tracheostomy visualized with adilated proximal trachea around the tracheostomy.      < end of copied text >    < from: US Duplex Venous Lower Ext Complete, Bilateral (20 @ 13:36) >  IMPRESSION:     No evidence of deep venous thrombosis in either lower extremity.      < end of copied text >

## 2020-03-12 NOTE — PROGRESS NOTE ADULT - SUBJECTIVE AND OBJECTIVE BOX
PULMONARY PROGRESS NOTE      WANDER BannerPHOENIX-[id number]    Patient is a 59y old  Female who presents with a chief complaint of Acute respiratory distress (12 Mar 2020 09:27)      INTERVAL HPI/OVERNIGHT EVENTS:  no overnight issues  no complaints of chest pain or SOB  no abd pain  MEDICATIONS  (STANDING):  apixaban 5 milliGRAM(s) Oral every 12 hours  atovaquone Suspension 1500 milliGRAM(s) Oral daily  cefTRIAXone   IVPB 1000 milliGRAM(s) IV Intermittent every 24 hours  chlorhexidine 0.12% Liquid 15 milliLiter(s) Oral Mucosa every 12 hours  collagenase Ointment 1 Application(s) Topical daily  famotidine    Tablet 20 milliGRAM(s) Oral daily  fentaNYL   Patch  12 MICROgram(s)/Hr 1 Patch Transdermal every 72 hours  fentaNYL   Patch  25 MICROgram(s)/Hr 1 Patch Transdermal every 72 hours  mirtazapine 15 milliGRAM(s) Oral at bedtime  OXcarbazepine 600 milliGRAM(s) Oral two times a day  polyethylene glycol 3350 17 Gram(s) Oral at bedtime  predniSONE   Tablet 20 milliGRAM(s) Oral daily  QUEtiapine 25 milliGRAM(s) Oral at bedtime  senna Syrup 10 milliLiter(s) Oral at bedtime  sucralfate suspension 1 Gram(s) Oral every 6 hours      MEDICATIONS  (PRN):  acetaminophen    Suspension .. 650 milliGRAM(s) Oral every 6 hours PRN Temp greater or equal to 38C (100.4F), Mild Pain (1 - 3)  ALBUTerol   0.042% 1.25 milliGRAM(s) Nebulizer four times a day PRN Shortness of Breath and/or Wheezing  aluminum hydroxide/magnesium hydroxide/simethicone Suspension 30 milliLiter(s) Oral every 4 hours PRN Dyspepsia  melatonin 5 milliGRAM(s) Oral at bedtime PRN Insomnia  simethicone drops 40 milliGRAM(s) Oral every 6 hours PRN Gas      Allergies    IV Contrast (Anaphylaxis)  shellfish. (Anaphylaxis)    Intolerances    lorazepam (Other (Severe))      PAST MEDICAL & SURGICAL HISTORY:  Interstitial lung disease: on home o2 prn  NHL (non-Hodgkin's lymphoma): Agem 43 sp chemo/rt/stem cell  Transient cerebral ischemia, unspecified type  Mitral prolapse  History of tonsillectomy  History of appendectomy      SOCIAL HISTORY  Smoking History:       REVIEW OF SYSTEMS:    CONSTITUTIONAL:  No distress    HEENT:  Eyes:  No diplopia or blurred vision. ENT:  No earache, sore throat or runny nose.    CARDIOVASCULAR:  No pressure, squeezing, tightness, heaviness or aching about the chest; no palpitations.    RESPIRATORY: see HPI    GASTROINTESTINAL:  No nausea, vomiting or diarrhea.    GENITOURINARY:  No dysuria, frequency or urgency.    NEUROLOGIC:  No paresthesias, fasciculations, seizures or weakness.    PSYCHIATRIC:  No disorder of thought or mood.    Vital Signs Last 24 Hrs  T(C): 36.5 (12 Mar 2020 08:00), Max: 36.8 (12 Mar 2020 05:00)  T(F): 97.7 (12 Mar 2020 08:00), Max: 98.2 (12 Mar 2020 05:00)  HR: 109 (12 Mar 2020 11:00) (91 - 125)  BP: 94/50 (12 Mar 2020 11:00) (94/50 - 114/59)  BP(mean): 65 (12 Mar 2020 11:00) (60 - 83)  RR: 22 (12 Mar 2020 11:) (12 - 37)  SpO2: 100% (12 Mar 2020 11:00) (98% - 100%)    PHYSICAL EXAMINATION:    GENERAL: The patient is awake and alert in no apparent distress.     HEENT: Head is normocephalic and atraumatic. Extraocular muscles are intact. Mucous membranes are moist.    NECK: Supple.    LUNGS: moderate air entry bilat  without wheezing, rales or rhonchi; respirations unlabored    HEART: Regular rate and rhythm without murmur.    ABDOMEN: Soft, nontender, and nondistended.      EXTREMITIES: Without any cyanosis, clubbing, rash, lesions or edema.    NEUROLOGIC: Grossly intact.    LABS:                        7.3    18.85 )-----------( 677      ( 12 Mar 2020 06:01 )             24.3     03-12    131<L>  |  87<L>  |  27.0<H>  ----------------------------<  157<H>  3.8   |  35.0<H>  |  0.32<L>    Ca    9.2      12 Mar 2020 06:01  Mg     2.2     03-10    TPro  6.4<L>  /  Alb  3.0<L>  /  TBili  0.2<L>  /  DBili  x   /  AST  46<H>  /  ALT  53<H>  /  AlkPhos  167<H>  03-10      Urinalysis Basic - ( 11 Mar 2020 00:16 )    Color: Yellow / Appearance: Cloudy / S.020 / pH: x  Gluc: x / Ketone: Trace  / Bili: Negative / Urobili: Negative   Blood: x / Protein: 100 / Nitrite: Positive   Leuk Esterase: Moderate / RBC: 25-50 /HPF / WBC 6-10   Sq Epi: x / Non Sq Epi: Occasional / Bacteria: Occasional      ABG - ( 10 Mar 2020 22:29 )  pH, Arterial: 7.42  pH, Blood: x     /  pCO2: 69    /  pO2: 152   / HCO3: 41    / Base Excess: 17.2  /  SaO2: >100              CARDIAC MARKERS ( 10 Mar 2020 23:15 )  x     / <0.01 ng/mL / x     / x     / x            Serum Pro-Brain Natriuretic Peptide: 885 pg/mL (20 @ 06:02)      Procalcitonin, Serum: 0.16 ng/mL (20 @ 14:33)      MICROBIOLOGY:    RADIOLOGY & ADDITIONAL STUDIES:  cxr and Ct scan reviewed and compared

## 2020-03-12 NOTE — DIETITIAN INITIAL EVALUATION ADULT. - MALNUTRITION
limited NFPE: severe muscle loss of temples, clavicles, shoulders; severe fat loss of orbitals and buccal pads severe, chronic

## 2020-03-12 NOTE — PROGRESS NOTE ADULT - SUBJECTIVE AND OBJECTIVE BOX
Subjective: Patient is hemodynamically stable on 40% FiO2. Daughter at bedside. Unable to obtain appropriate HPI due to trach.     Vital Signs:  Vital Signs Last 24 Hrs  T(C): 36.8 (03-12-20 @ 06:00), Max: 36.8 (03-12-20 @ 05:00)  T(F): 98.2 (03-12-20 @ 06:00), Max: 98.2 (03-12-20 @ 05:00)  HR: 106 (03-12-20 @ 09:00) (89 - 125)  BP: 111/56 (03-12-20 @ 09:00) (94/50 - 114/59)  RR: 30 (03-12-20 @ 09:00) (12 - 37)  SpO2: 99% (03-12-20 @ 09:00) (98% - 100%) on (O2)    Relevant labs, radiology and Medications reviewed    Pertinent Physical Exam  General: Well appearing, NAD  Neuro: non-focal, VAIL  Cardiac: S1S2, no murmurs  Pulm: CTA b/l, no wheezing or rales  Abdomen: Soft, NT, ND, hypoactive BS  Peripheral: +DP pulses b/l, no peripheral edema     03-11 @ 07:01  -  03-12 @ 07:00  --------------------------------------------------------  IN:    Enteral Tube Flush: 820 mL    Nepro: 855 mL    Solution: 50 mL  Total IN: 1725 mL    OUT:    Indwelling Catheter - Urethral: 1475 mL  Total OUT: 1475 mL    Total NET: 250 mL      03-12 @ 07:01  -  03-12 @ 09:27  --------------------------------------------------------  IN:    Nepro: 45 mL    Solution: 50 mL  Total IN: 95 mL    OUT:  Total OUT: 0 mL    Total NET: 95 mL

## 2020-03-12 NOTE — CHART NOTE - NSCHARTNOTEFT_GEN_A_CORE
Called from MICU RN. Pts  regular, chronic vent  VSS B/P 117/60 T 97.8 R 18 PO 95%   pt resting, appears comfortable   AT this time  continue yo monitor  am labs ordered Called from MICU RN. Pts  regular, chronic vent, h/o intermitent tachycardia  VSS B/P 117/60 T 97.8 R 18 PO 95%   pt resting, appears comfortable   AT this time  continue yo monitor  am labs ordered

## 2020-03-12 NOTE — DIETITIAN INITIAL EVALUATION ADULT. - ETIOLOGY
related to inability to meet sufficient protein-energy in setting of interstitial lung disease, chronic respiratory failure requiring trach, and skin breakdown

## 2020-03-12 NOTE — ADVANCED PRACTICE NURSE CONSULT - ASSESSMENT
Lethargic, bedbound, incontinent of stool, urethral catheter in place.  Skin warm, dry with fair turgor, scattered areas of hyperpigmentation and hypopigmentation, blanchable erythema to bilateral heels.      Stage 3 Sacral pressure Injury measuring 4.5 x 4.0 cm, the wound bed is covered with 60% non-viable tissue and 40% slough tissue, small amount of serous drainage, no undermining or tunneling or odor.  No erythema, warmth or induration noted to periwound.

## 2020-03-12 NOTE — DIETITIAN INITIAL EVALUATION ADULT. - OTHER INFO
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 After the trach and peg placement, patient was discharged to [facility]. Upon arrival to [facility] pt was placed on volume control ventilation at which time she immediately desaturated, became unresponsive and in acute respiratory distress. She was then transferred to [facility]. CT chest today with small right sided pneumothorax, also being treated for UTI. Spoke with daughter at bedside who reports pt with 10 lb wt loss during recent hospitalization which was ~11 weeks. Per EMR review, pt was receiving Nepro at 45 ml/hr x 24 hrs which provided 1080 ml, 1944 kcal, 87g protein, 785 ml free water, and >100% of RDIs for vitamins/minerals. Pt currently receiving same nutrition regimen here, however, no indication for Nepro formula at this time.

## 2020-03-12 NOTE — ADVANCED PRACTICE NURSE CONSULT - RECOMMEDATIONS
Recommend nutritional supplement, Wolf, for patient.  Recommendation for Sacral Pressure Injury:  Daily cleanse wound and periwound with Normal Saline, apply collagenase (Santyl), nickel-coin thickness to entire base of wound, packed dead space with gauze cover with dry dressing.    Goal of Care:  Enzymatic debridement    Continue low air loss bed therapy until arrival of Clinitron therapy, continue to turn & reposition q2h with Z-debby fluidized positioning device, soft pillow between bony prominences, Z-Debby Heel boots to bilateral feet and single breathable pad, please continue measures to decrease friction/shear/pressure.     Plan discussed with patient, her primary nurses, Yeny Negron RN and Dr. Marco Mendoza.  Please call wound care service line at [phone], if further assistance is needed.

## 2020-03-12 NOTE — CHART NOTE - NSCHARTNOTEFT_GEN_A_CORE
Upon Nutritional Assessment by the Registered Dietitian your patient was determined to meet criteria / has evidence of the following diagnosis/diagnoses:          [ ]  Mild Protein Calorie Malnutrition        [ ]  Moderate Protein Calorie Malnutrition        [x ] Severe Protein Calorie Malnutrition        [ ] Unspecified Protein Calorie Malnutrition        [ ] Underweight / BMI <19        [ ] Morbid Obesity / BMI > 40    Pt presents at high nutrition risk secondary to malnutrition (severe, chronic) related to inability to meet sufficient protein-energy in setting of interstitial lung disease, chronic respiratory failure requiring trach, and skin breakdown as evidenced by severe muscle loss of temples, clavicles, shoulders; severe fat loss of orbitals and buccal pads, 8.1% wt loss x ~3 months.     Findings as based on:  •  Comprehensive nutrition assessment and consultation  •  Calorie counts (nutrient intake analysis)  •  Food acceptance and intake status from observations by staff  •  Follow up  •  Patient education  •  Intervention secondary to interdisciplinary rounds  •   concerns      Treatment:    The following has been recommended:  1) Change tube feeds to Jevity 1.5, goal rate of 60 ml/hr (x20 hrs) to provide 1200 ml, 1800 kcal, 77g protein, 917 ml free water, and >100% of RDIs for vitamins/minerals. Additional free water per MD discretion.  2) Add Prostat 30 ml once daily to better meet pts estimated needs (tube feeds + supplement will provide 1900 kcal and 92g protein daily).  3) Add MVI and vit C 500mg daily to further aid in wound healing.   4) Monitor tube feed tolerance.   5) Obtain daily weights to monitor trends.      PROVIDER Section:     By signing this assessment you are acknowledging and agree with the diagnosis/diagnoses assigned by the Registered Dietitian    Comments:

## 2020-03-12 NOTE — PROGRESS NOTE ADULT - ASSESSMENT
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 After the trach and peg placement, patient was discharged to [facility].  Upon arrival to [facility] pt was placed on volume control ventilation at which time she immediately desaturated, became unresponsive and in acute respiratory distress. She was then transferred to [facility].   CT chest 3/11 had a small right sided pneumothorax.  CTS consulted for evaluation.  Follow up CXR on 3/12 showed stable small right pneumothorax. No intervention anticipated at this time. Please reconsult PRN

## 2020-03-12 NOTE — DIETITIAN INITIAL EVALUATION ADULT. - PHYSICAL APPEARANCE
other (specify)/BMI 16.9 (based on admission weight of 114.6 lbs) Unstageable pressure injury to medial coccyx per documentation

## 2020-03-12 NOTE — DIETITIAN INITIAL EVALUATION ADULT. - PERTINENT MEDS FT
MEDICATIONS  (STANDING):  apixaban 5 milliGRAM(s) Oral every 12 hours  atovaquone Suspension 1500 milliGRAM(s) Oral daily  cefTRIAXone   IVPB 1000 milliGRAM(s) IV Intermittent every 24 hours  chlorhexidine 0.12% Liquid 15 milliLiter(s) Oral Mucosa every 12 hours  collagenase Ointment 1 Application(s) Topical daily  famotidine    Tablet 20 milliGRAM(s) Oral daily  fentaNYL   Patch  12 MICROgram(s)/Hr 1 Patch Transdermal every 72 hours  fentaNYL   Patch  25 MICROgram(s)/Hr 1 Patch Transdermal every 72 hours  mirtazapine 15 milliGRAM(s) Oral at bedtime  OXcarbazepine 600 milliGRAM(s) Oral two times a day  polyethylene glycol 3350 17 Gram(s) Oral at bedtime  predniSONE   Tablet 20 milliGRAM(s) Oral daily  QUEtiapine 25 milliGRAM(s) Oral at bedtime  senna Syrup 10 milliLiter(s) Oral at bedtime  simethicone drops 40 milliGRAM(s) Oral every 6 hours  sucralfate suspension 1 Gram(s) Oral every 6 hours    MEDICATIONS  (PRN):  acetaminophen    Suspension .. 650 milliGRAM(s) Oral every 6 hours PRN Temp greater or equal to 38C (100.4F), Mild Pain (1 - 3)  ALBUTerol   0.042% 1.25 milliGRAM(s) Nebulizer four times a day PRN Shortness of Breath and/or Wheezing  aluminum hydroxide/magnesium hydroxide/simethicone Suspension 30 milliLiter(s) Oral every 4 hours PRN Dyspepsia  melatonin 5 milliGRAM(s) Oral at bedtime PRN Insomnia

## 2020-03-12 NOTE — ADVANCED PRACTICE NURSE CONSULT - REASON FOR CONSULT
Patient seen on skin care rounds after wound care referral received for assessment of skin impairment and recommendations of topical management.  Chart reviewed:   BMI: 20.1, Moses (13), Serum albumin (3.0 g/dL) and Total Protein (6.4 g/dL). Patient H/O severe restrictive interstitial lung disease, s/p respiratory failure s/p trach 1/29, ventilator dependent, s/p PEA arrest (1/29), RLE DVT and RUE occlusive thrombus, toxic metabolic encephalopathy, severe sepsis secondary to serratia PNA, unspecified pleural/parenchymal bronchiectatic process with pulmonary nodules for which she has opposed steroid therapy (prior), spontaneous right-sided PTX (1/19), stage Peg NHL (s/p chemo/XRT and SCT received '03 at [facility] - in remission) TIA/CVA (2/19) with residual left sided weakness, neuralgia with left facial numbness and extremity paresthesia seizure disorder, meniere's disease, GERD, and MVP with mitral insufficiency, Severe malnutrition related to chronic disease (Cancer and Pulmonary disease). Patient was discharged to [facility], on the day of arrival to [facility], from [facility]. Upon arrival to [facility] the patient was placed on volume control ventilation at which time she immediately desaturated, became unresponsive and in acute respiratory distress.  History obtained from the patient’s daughter, Eva, at bedside and patient chart.

## 2020-03-12 NOTE — PROGRESS NOTE ADULT - ASSESSMENT
This is a 58 y/o female with history of severe restrictive interstitial lung disease, s/p resp failure s/p trach 1/29, ventilator dependent, s/p PEA arrest (1/29), RLE DVT and RUE occlusive thrombus, toxic metabolic encephalopathy, severe sepsis secondary to serratia PNA, unspecified pleural/parenchymal bronchiectatic process with pulmonary nodules for which she has opposed steroid therapy (prior), spontaneous right-sided PTX (1/19), stage Peg NHL (s/p chemo/XRT and [place] [facility] [place]) 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 Patient was discharged to [facility], on the day of arrival to [facility], from [facility]. Upon arrival to [facility] pt was placed on volume control ventilation at which time she immediately desaturated, became unresponsive and in acute respiratory distress.  history obtained from patient daughter at bedside and patient chart. Family is currently requesting that patient not return to [facility]. Patient underwent CT scan on 3/11 which showed apical PTX however no surgery at this time per CTsx. Patient being evaluated for facilities that can accomodate pressure control vent which are her current settings.     #Acute respiratory distress as patient was placed on volume ventilation instead of Pressure control ventilation, as per [facility] respiratory therapist: patient was placed on , 20, 50%, peep 5, as per the therapist [facility] is not able to use pressure control in their facility.   - on pressure control and tolerating well.  - continue cardiac monitor  - Pulmonary consult - recs appreciated  - duoneb for now  - continue vent  - Pulmonary toilet as needed  - Daily trach care  - cont prednisone 20mg for ILD with Atovaquone for PCP prophylaxis   - / consult - for discharge arrangement for a NH than can ventilate patient with use of pressure control ventilation.  - avoid BZDs if possible had a paradoxical reaction to ativan.   - cont fentanyl 37mcg patch Q72hrs  - Aspiration and fall precautions  - Bedrest  - elevated head of bed.     #anemia  - patient on eliquis  - no signs of GI bleed at this time  - repeat CBC in afternoon  - continue to trend  - watch for signs of bleed  - continue eliquis for now  - stool OB if possible.    #Abnormal UA, consistent with UTI, awaiting Urine culture, chronic indwelling avendano present on arrival, changed on arrival.   - culture shows normal karol - can possibly discontinue  - IV Rocephin     #Right Brachial Vein Thrombosis & Left Femoral DVT, present on arrival; due to line in that arm previously; since removed  - repeat US ordered by pulmonology  - cont Eliquis    #Severe Malnutrition   - cont TF: Nepro @45ml/hr for 24hrs  - nutrition consult    #h/o constipation, cont bowel regimen    #h/o Sacrum/coccyx pressure ulcer, present on admit  - cont daily wound care, cont Santyl    advance directives: FULL CODE

## 2020-03-12 NOTE — PROGRESS NOTE ADULT - NSHPATTENDINGPLANDISCUSS_GEN_ALL_CORE
Patient is a 65y old  Female who presents with a chief complaint of GI Bleed (03 Nov 2019 07:55)  Covering for Dr. Ambrocio    HPI:  Pt is a 64 yo Female with a PMH/o aortic dissection s/p multiple repairs, spinal hematoma resulting in paraplegia (on baclofen pump), nephrolithiasis with retained stent (did not f/u to remove as per pt s/p d/c home), recurrent UTIs ESBL positive in the past 2/2 to straight catherization, HTN, PAD, and HSV endophthalmitis/keratitis s/p left corneal transplant who presented to [facility] on 10/28 with lethargy, weakness, malaise. Per chart review, Pt denied any bright red bleeding per rectum, vomiting, diarrhea, nausea, abdominal pain, but did endorse dark stool. Of note, Pt has been hospitalized multiple times with endoscopies and capsule endoscopies, and CTA's which have all been without source of bleeding. Pt has a h/o recurrent GI bleeds, thought to be due to a Dielafoys lesion, and had been discharged a few days ago with a hgb of 9. On day of admission the patient's hemoglobin was 5.2. The patient was transfused 6 units of PRBCs. The patient went for an endoscopy on 10/31 and was found to have red blood in the gastric fundus and body but the source of the blood could not be found. IR was consulted for an emergent arteriogram. They were unable to cross the celiac origin occlusion or access the celiac branch vessels and no embolization was performed. The patient was intubated and placed under ICU care for acute hypoxic respiratory failure and then extubated the next morning once she was hemodynamically stable. The patient's hemoglobin continues to decline. The patient is also being treated for UTI with meropenem given history of ESBL. On day of transfer the patient will have received 4 days worth of meropenem. Patient is being transferred to [facility] MICU now for further management for her GI bleed as well as possible partial gastrectomy.     Of note, patient recently admitted to [place] on 10/17 for hypotension, found to be anemic. Patient had EGD done x2, capsule endoscopy as well as CTA performed, all of which did not identify source of her bleeding. However, her GI doctor believes it to be due to Dieulafoys lesion. (02 Nov 2019 17:23)    Currently feels well. Denies abdominal pain. denies nausea or vomiting  stool is more brown. feels hungry and would like to eat.     PAST MEDICAL & SURGICAL HISTORY:  Melena: 10/7/2019  Gastrointestinal hemorrhage: 9/28/2019, 9/4/2019, 8/29/2019  Dieulafoy lesion of stomach or duodenum: 10/1/2019  Subdural hematoma, nontraumatic: spontaneous  Dorsalgia of lumbar region: on pain medication /baclofen po and pump  Self-catheterizes urinary bladder  Anemia: chronic  Uveitis  Osteoporosis  PAD (peripheral artery disease)  Hematoma: spinal treated September 2018  Paraplegia: due to spinal hematoma, on wheelchair goes to physical therapy 2 x weekly  Aortic dissection, thoracic: Type A Repaired 2009  Blindness of left eye: hx corneal transplant 2018  Aug. 2018  UTI (urinary tract infection): recurrent  TIA (transient ischemic attack)  HTN (Hypertension)  History of corneal transplant: left corneal transplant on 5/21/2018  Disorder of spine: unthetethering 2 x  Presence of IVC filter: 2014 ?  S/P aortic dissection repair: Type A Dissection repair /2009   descending aortic aneurysm repair 9/2016  H/O Spinal surgery: laminectomies 2014      MEDICATIONS  (STANDING):  amLODIPine   Tablet 5 milliGRAM(s) Oral daily  atorvastatin 40 milliGRAM(s) Oral at bedtime  baclofen 20 milliGRAM(s) Oral two times a day  chlorhexidine 4% Liquid 1 Application(s) Topical <User Schedule>  DULoxetine 20 milliGRAM(s) Oral two times a day  gabapentin 600 milliGRAM(s) Oral three times a day  meropenem  IVPB 1000 milliGRAM(s) IV Intermittent every 8 hours  pantoprazole  Injectable 40 milliGRAM(s) IV Push two times a day  prednisoLONE acetate 1% Suspension 1 Drop(s) Both EYES four times a day  sodium chloride 2% Ophthalmic Solution 1 Drop(s) Both EYES daily  valACYclovir 1000 milliGRAM(s) Oral three times a day    MEDICATIONS  (PRN):  oxyCODONE    IR 10 milliGRAM(s) Oral three times a day PRN Severe Pain (7 - 10)  zolpidem 5 milliGRAM(s) Oral at bedtime PRN Insomnia      Allergies    No Known Allergies    Intolerances        Review of Systems:    General:  No wt loss, fevers, chills, night sweats,fatigue,   CV:  No pain, palpitatioins, hypo/hypertension  Resp:  No dyspnea, cough, tachypnea, wheezing  GI:  No pain, No nausea, No vomiting, No diarrhea, No constipatiion, No weight loss, No fever, No pruritis, No rectal bleeding,+ tarry stools, No dysphagia,  :  No pain, bleeding, incontinence, nocturia  Muscle:  No pain, weakness  Neuro:  No weakness, tingling, memory problems  Psych:  No fatigue, insomnia, mood problems, depression  Endocrine:  No polyuria, polydypsia, cold/heat intolerance  Heme:  No petechiae, ecchymosis, easy bruisability  Skin:  No rash, tattoos, scars, edema      Vital Signs Last 24 Hrs  T(C): 36.6 (03 Nov 2019 08:00), Max: 36.9 (03 Nov 2019 00:00)  T(F): 97.8 (03 Nov 2019 08:00), Max: 98.5 (03 Nov 2019 00:00)  HR: 75 (03 Nov 2019 09:00) (65 - 76)  BP: 143/66 (03 Nov 2019 09:00) (124/62 - 175/82)  BP(mean): 95 (03 Nov 2019 09:00) (77 - 117)  RR: 12 (03 Nov 2019 09:00) (10 - 17)  SpO2: 99% (03 Nov 2019 09:00) (93% - 100%)    PHYSICAL EXAM:    Constitutional: NAD, well-developed  Neck: No LAD, supple  Respiratory: CTA and P  Cardiovascular: S1 and S2, RRR, no M  Gastrointestinal: BS+, soft, NT/ND, neg HSM,  Extremities: No peripheral edema, neg clubing, cyanosis  Vascular: 2+ peripheral pulses  Neurological: A/O x 3, no focal deficits  Psychiatric: Normal mood, normal affect  Skin: No rashes        LABS:                        10.5   7.55  )-----------( 280      ( 03 Nov 2019 09:33 )             31.7     11-03    136  |  105  |  7   ----------------------------<  85  4.2   |  25  |  0.35<L>    Ca    8.1<L>      03 Nov 2019 01:47  Phos  2.4     11-03  Mg     1.7     11-03    TPro  4.6<L>  /  Alb  2.7<L>  /  TBili  0.6  /  DBili  x   /  AST  15  /  ALT  11  /  AlkPhos  62  11-02    PT/INR - ( 03 Nov 2019 01:47 )   PT: 11.2 sec;   INR: 0.97 ratio         PTT - ( 03 Nov 2019 01:47 )  PTT:31.9 sec    LIVER FUNCTIONS - ( 02 Nov 2019 17:43 )  Alb: 2.7 g/dL / Pro: 4.6 g/dL / ALK PHOS: 62 U/L / ALT: 11 U/L / AST: 15 U/L / GGT: x             RADIOLOGY & ADDITIONAL TESTS: pt, family, bedside pt, family, bedside, PMD

## 2020-03-12 NOTE — PROGRESS NOTE ADULT - ASSESSMENT
Chronic hypoxemic and hypercapnic respiratory failure, CVA  chronic R apical Ptx, ILD, NSIP like with peribronchial GG/ consolidation, traction bronchiectasis  T surgery input noted, discussed with family  currently stable on PC vent 30 , VE 9 L, PAP 37 no autopeep, 40% Fi02, sp02 99%, TV improved 320-340  hx Upper extremity DVT related to catheter which has been removed, need to confirm, current duplex negative bilat thus ? need for full dose AC   Placed on prednisone 20 mg and atovaquone from [facility], no definite etiology  minimal  procalcitonin, ID input pending,   minimal BNP, last echocardiogram with normal LV  will attempt to change to AC as stabilizes with potential transfer to NH  prognosis remains extremely guarded Chronic hypoxemic and hypercapnic respiratory failure, CVA  chronic R apical Ptx, ILD, NSIP like with peribronchial GG/ consolidation, traction bronchiectasis  T surgery input noted, discussed with family  currently stable on PC vent 30 , VE 9 L, PAP 37 no autopeep, 40% Fi02, sp02 99%, TV improved 320-340  hx Upper extremity DVT related to catheter which has been removed, need to confirm, current duplex negative bilat UE  thus ? need for full dose AC   hct decreased will repeat check stool guiaic  Placed on prednisone 20 mg and atovaquone from [facility], no definite etiology  minimal  procalcitonin, ID input pending,   minimal BNP, last echocardiogram with normal LV  will attempt to change to AC as stabilizes with potential transfer to NH  prognosis remains extremely guarded Chronic hypoxemic and hypercapnic respiratory failure, CVA  chronic R apical Ptx, ILD, NSIP like with peribronchial GG/ consolidation, traction bronchiectasis  T surgery input noted, discussed with family  currently stable on PC vent 30 , VE 9 L, PAP 37 no autopeep, 40% Fi02, sp02 99%, TV improved 320-340  hx Upper extremity DVT related to catheter which has been removed, need to confirm, current duplex negative bilat UE  thus ? need for full dose AC   hct decreased will repeat check stool guiaic  Placed on prednisone 20 mg and atovaquone from [facility], no definite etiology  minimal  procalcitonin, ID input pending,   minimal BNP, last echocardiogram with normal LV  will attempt to change to AC as stabilizes with potential transfer to NH  prognosis remains extremely guarded  Needs palliative input, goals of care Chronic hypoxemic and hypercapnic respiratory failure, CVA  chronic R apical Ptx, ILD, NSIP like with peribronchial GG/ consolidation, traction bronchiectasis  T surgery input noted, discussed with family  currently stable on PC vent 30 , VE 9 L, PAP 37 no autopeep, 40% Fi02, sp02 99%, TV improved 320-340  hx Upper extremity DVT related to catheter which has been removed, need to confirm, current duplex negative bilat UE  thus no need for  full AC, will d/c  hct decreased will repeat check stool guiaic  Placed on prednisone 20 mg and atovaquone from [facility], no definite etiology  minimal  procalcitonin, ID input pending,   minimal BNP, last echocardiogram with normal LV  will attempt to find NH that take PC vent prior to any further adjusstments  prognosis remains extremely guarded  Needs palliative input, goals of care Chronic hypoxemic and hypercapnic respiratory failure, CVA  chronic R apical Ptx, ILD, NSIP like with peribronchial GG/ consolidation, traction bronchiectasis  T surgery input noted, discussed with family  currently stable on PC vent 30 , VE 9 L, PAP 37 no autopeep, 40% Fi02, sp02 99%, TV improved 320-340  hx Upper extremity DVT related to catheter which has been removed, need to confirm, current duplex negative bilat UE  thus no need for  full AC, will d/c  hct decreased will repeat check stool guiaic  Placed on prednisone 20 mg and atovaquone from [facility], no definite etiology  minimal  procalcitonin, ID input pending,   minimal BNP, last echocardiogram with normal LV  will attempt to find NH that take PC vent prior to any further adjustments  prognosis remains extremely guarded  Needs palliative input, goals of care  No formal D/C summary from [facility] found, family is contacting them for one

## 2020-03-12 NOTE — PROGRESS NOTE ADULT - SUBJECTIVE AND OBJECTIVE BOX
CC: Acute respiratory distress (12 Mar 2020 12:03)    INTERVAL HPI/OVERNIGHT EVENTS:  no acute events overnight  patient with CT that showed apical PTX  seen by CT sx at this time no interventions  patient this morning without complaints  tolerating current vent settings  patietn with drop in H/H overnight - repeat ordered for 3 PM    Vital Signs Last 24 Hrs  T(C): 36.6 (12 Mar 2020 12:00), Max: 36.8 (12 Mar 2020 05:00)  T(F): 97.9 (12 Mar 2020 12:00), Max: 98.2 (12 Mar 2020 05:00)  HR: 112 (12 Mar 2020 12:24) (94 - 125)  BP: 105/58 (12 Mar 2020 12:00) (94/50 - 114/59)  BP(mean): 74 (12 Mar 2020 12:00) (60 - 83)  RR: 27 (12 Mar 2020 12:00) (12 - 37)  SpO2: 99% (12 Mar 2020 12:24) (98% - 100%)    PHYSICAL EXAM:  General:  in no acute distress; sarcopenic  Eyes: PERRLA, EOMI; conjunctiva and sclera clear  Neck: Supple; non tender; no masses; trach in place; on vent  Respiratory: bilateral rhonchi/rales; unchanged  Cardiovascular: Regular rate and rhythm. S1 and S2 Normal; No murmurs, gallops or rubs  Gastrointestinal: Soft non-tender non-distended; Normal bowel sounds  Extremities: Normal range of motion, No clubbing, cyanosis or edema  Vascular: Peripheral pulses palpable 2+ bilaterally  Neurological: Alert; generalized weakness 3/5 in all ext  Psychiatric: Cooperative and appropriate    I&O's Detail    11 Mar 2020 07:  -  12 Mar 2020 07:00  --------------------------------------------------------  IN:    Enteral Tube Flush: 820 mL    Nepro: 855 mL    Solution: 50 mL  Total IN: 1725 mL    OUT:    Indwelling Catheter - Urethral: 1475 mL  Total OUT: 1475 mL    Total NET: 250 mL    12 Mar 2020 07:01  -  12 Mar 2020 13:14  --------------------------------------------------------  IN:    Enteral Tube Flush: 60 mL    Nepro: 225 mL    Solution: 50 mL  Total IN: 335 mL    OUT:    Indwelling Catheter - Urethral: 175 mL  Total OUT: 175 mL    Total NET: 160 mL    CARDIAC MARKERS ( 10 Mar 2020 23:15 )  x     / <0.01 ng/mL / x     / x     / x                            7.3    18.85 )-----------( 677      ( 12 Mar 2020 06:01 )             24.3     12 Mar 2020 06:01    131    |  87     |  27.0   ----------------------------<  157    3.8     |  35.0   |  0.32     Ca    9.2        12 Mar 2020 06:01  Mg     2.2       10 Mar 2020 23:15    TPro  6.4    /  Alb  3.0    /  TBili  0.2    /  DBili  x      /  AST  46     /  ALT  53     /  AlkPhos  167    10 Mar 2020 23:15    CAPILLARY BLOOD GLUCOSE    LIVER FUNCTIONS - ( 10 Mar 2020 23:15 )  Alb: 3.0 g/dL / Pro: 6.4 g/dL / ALK PHOS: 167 U/L / ALT: 53 U/L / AST: 46 U/L / GGT: x           Urinalysis Basic - ( 11 Mar 2020 00:16 )    Color: Yellow / Appearance: Cloudy / S.020 / pH: x  Gluc: x / Ketone: Trace  / Bili: Negative / Urobili: Negative   Blood: x / Protein: 100 / Nitrite: Positive   Leuk Esterase: Moderate / RBC: 25-50 /HPF / WBC 6-10   Sq Epi: x / Non Sq Epi: Occasional / Bacteria: Occasional    MEDICATIONS  (STANDING):  atovaquone Suspension 1500 milliGRAM(s) Oral daily  cefTRIAXone   IVPB 1000 milliGRAM(s) IV Intermittent every 24 hours  chlorhexidine 0.12% Liquid 15 milliLiter(s) Oral Mucosa every 12 hours  collagenase Ointment 1 Application(s) Topical daily  enoxaparin Injectable 40 milliGRAM(s) SubCutaneous daily  famotidine    Tablet 20 milliGRAM(s) Oral daily  fentaNYL   Patch  12 MICROgram(s)/Hr 1 Patch Transdermal every 72 hours  fentaNYL   Patch  25 MICROgram(s)/Hr 1 Patch Transdermal every 72 hours  mirtazapine 15 milliGRAM(s) Oral at bedtime  OXcarbazepine 600 milliGRAM(s) Oral two times a day  polyethylene glycol 3350 17 Gram(s) Oral at bedtime  predniSONE   Tablet 20 milliGRAM(s) Oral daily  QUEtiapine 25 milliGRAM(s) Oral at bedtime  senna Syrup 10 milliLiter(s) Oral at bedtime  sucralfate suspension 1 Gram(s) Oral every 6 hours    MEDICATIONS  (PRN):  acetaminophen    Suspension .. 650 milliGRAM(s) Oral every 6 hours PRN Temp greater or equal to 38C (100.4F), Mild Pain (1 - 3)  ALBUTerol   0.042% 1.25 milliGRAM(s) Nebulizer four times a day PRN Shortness of Breath and/or Wheezing  aluminum hydroxide/magnesium hydroxide/simethicone Suspension 30 milliLiter(s) Oral every 4 hours PRN Dyspepsia  melatonin 5 milliGRAM(s) Oral at bedtime PRN Insomnia  simethicone drops 40 milliGRAM(s) Oral every 6 hours PRN Gas      RADIOLOGY & ADDITIONAL TESTS:

## 2020-03-13 LAB
ABO RH CONFIRMATION: SIGNIFICANT CHANGE UP
BLD GP AB SCN SERPL QL: SIGNIFICANT CHANGE UP
HCT VFR BLD CALC: 24.9 % — LOW (ref 34.5–45)
HCT VFR BLD CALC: 25.1 % — LOW (ref 34.5–45)
HGB BLD-MCNC: 7.5 G/DL — LOW (ref 11.5–15.5)
HGB BLD-MCNC: 7.7 G/DL — LOW (ref 11.5–15.5)
MCHC RBC-ENTMCNC: 26.5 PG — LOW (ref 27–34)
MCHC RBC-ENTMCNC: 27.2 PG — SIGNIFICANT CHANGE UP (ref 27–34)
MCHC RBC-ENTMCNC: 30.1 GM/DL — LOW (ref 32–36)
MCHC RBC-ENTMCNC: 30.7 GM/DL — LOW (ref 32–36)
MCV RBC AUTO: 88 FL — SIGNIFICANT CHANGE UP (ref 80–100)
MCV RBC AUTO: 88.7 FL — SIGNIFICANT CHANGE UP (ref 80–100)
PLATELET # BLD AUTO: 637 K/UL — HIGH (ref 150–400)
PLATELET # BLD AUTO: 639 K/UL — HIGH (ref 150–400)
RBC # BLD: 2.83 M/UL — LOW (ref 3.8–5.2)
RBC # BLD: 2.83 M/UL — LOW (ref 3.8–5.2)
RBC # FLD: 16.9 % — HIGH (ref 10.3–14.5)
RBC # FLD: 17 % — HIGH (ref 10.3–14.5)
WBC # BLD: 17.66 K/UL — HIGH (ref 3.8–10.5)
WBC # BLD: 18.18 K/UL — HIGH (ref 3.8–10.5)
WBC # FLD AUTO: 17.66 K/UL — HIGH (ref 3.8–10.5)
WBC # FLD AUTO: 18.18 K/UL — HIGH (ref 3.8–10.5)

## 2020-03-13 PROCEDURE — 99233 SBSQ HOSP IP/OBS HIGH 50: CPT

## 2020-03-13 PROCEDURE — 99232 SBSQ HOSP IP/OBS MODERATE 35: CPT

## 2020-03-13 RX ORDER — TRAMADOL HYDROCHLORIDE 50 MG/1
25 TABLET ORAL ONCE
Refills: 0 | Status: DISCONTINUED | OUTPATIENT
Start: 2020-03-13 | End: 2020-03-13

## 2020-03-13 RX ORDER — FERROUS SULFATE 325(65) MG
325 TABLET ORAL
Refills: 0 | Status: DISCONTINUED | OUTPATIENT
Start: 2020-03-13 | End: 2020-03-21

## 2020-03-13 RX ADMIN — Medication 1 GRAM(S): at 17:00

## 2020-03-13 RX ADMIN — Medication 650 MILLIGRAM(S): at 18:01

## 2020-03-13 RX ADMIN — SIMETHICONE 40 MILLIGRAM(S): 80 TABLET, CHEWABLE ORAL at 11:07

## 2020-03-13 RX ADMIN — Medication 1 GRAM(S): at 00:05

## 2020-03-13 RX ADMIN — Medication 650 MILLIGRAM(S): at 17:01

## 2020-03-13 RX ADMIN — Medication 30 MILLILITER(S): at 01:35

## 2020-03-13 RX ADMIN — Medication 650 MILLIGRAM(S): at 00:20

## 2020-03-13 RX ADMIN — FENTANYL CITRATE 1 PATCH: 50 INJECTION INTRAVENOUS at 07:58

## 2020-03-13 RX ADMIN — FAMOTIDINE 20 MILLIGRAM(S): 10 INJECTION INTRAVENOUS at 11:07

## 2020-03-13 RX ADMIN — OXCARBAZEPINE 600 MILLIGRAM(S): 300 TABLET, FILM COATED ORAL at 05:48

## 2020-03-13 RX ADMIN — CHLORHEXIDINE GLUCONATE 15 MILLILITER(S): 213 SOLUTION TOPICAL at 05:48

## 2020-03-13 RX ADMIN — FENTANYL CITRATE 1 PATCH: 50 INJECTION INTRAVENOUS at 19:06

## 2020-03-13 RX ADMIN — ATOVAQUONE 1500 MILLIGRAM(S): 750 SUSPENSION ORAL at 11:07

## 2020-03-13 RX ADMIN — Medication 1 GRAM(S): at 11:06

## 2020-03-13 RX ADMIN — ENOXAPARIN SODIUM 40 MILLIGRAM(S): 100 INJECTION SUBCUTANEOUS at 11:07

## 2020-03-13 RX ADMIN — CHLORHEXIDINE GLUCONATE 15 MILLILITER(S): 213 SOLUTION TOPICAL at 17:00

## 2020-03-13 RX ADMIN — Medication 1 APPLICATION(S): at 11:07

## 2020-03-13 RX ADMIN — Medication 650 MILLIGRAM(S): at 11:06

## 2020-03-13 RX ADMIN — OXCARBAZEPINE 600 MILLIGRAM(S): 300 TABLET, FILM COATED ORAL at 17:00

## 2020-03-13 RX ADMIN — TRAMADOL HYDROCHLORIDE 25 MILLIGRAM(S): 50 TABLET ORAL at 20:14

## 2020-03-13 RX ADMIN — Medication 1 GRAM(S): at 05:48

## 2020-03-13 RX ADMIN — TRAMADOL HYDROCHLORIDE 25 MILLIGRAM(S): 50 TABLET ORAL at 21:14

## 2020-03-13 RX ADMIN — SIMETHICONE 40 MILLIGRAM(S): 80 TABLET, CHEWABLE ORAL at 17:00

## 2020-03-13 RX ADMIN — Medication 20 MILLIGRAM(S): at 05:48

## 2020-03-13 RX ADMIN — Medication 650 MILLIGRAM(S): at 12:06

## 2020-03-13 RX ADMIN — Medication 650 MILLIGRAM(S): at 00:05

## 2020-03-13 RX ADMIN — Medication 1 GRAM(S): at 23:39

## 2020-03-13 RX ADMIN — Medication 325 MILLIGRAM(S): at 17:00

## 2020-03-13 NOTE — PROGRESS NOTE ADULT - ASSESSMENT
#Acute on chronic respiratory failure as patient was placed on volume ventilation instead of Pressure control ventilation, as per [facility] respiratory therapist: patient was placed on , 20, 50%, peep 5, as per the therapist [facility] is not able to use pressure control in their facility.   - on pressure control and tolerating well.  - continue cardiac monitor  - Pulmonary following  - duoneb for now  - continue vent  - Pulmonary toilet as needed  - Daily trach care  - cont prednisone 20mg for ILD with Atovaquone for PCP prophylaxis   - / consult - for discharge arrangement for a NH than can ventilate patient with use of pressure control ventilation.  - avoid BZDs if possible had a paradoxical reaction to ativan.   - cont fentanyl 37mcg patch Q72hrs  - Aspiration and fall precautions    #acute on chornic blood loss anemia  - eliquis on hold  - no signs of GI bleed at this time  - stool OB negative  -> will likley need transfusion when drops below 7, informed family.     #Abnormal UA, consistent with UTI, awaiting Urine culture, chronic indwelling avendano present on arrival, changed on arrival.   - culture shows normal karol - can monitor off abx    #Right Brachial Vein Thrombosis & Left Femoral DVT, present on arrival; due to line in that arm previously; since removed  - repeat US shows NO DVT  --> monitor off eliquis given anemia    #Severe protein calorie Malnutrition   - cont TF: Nepro @45ml/hr for 24hrs  - nutrition consult    #h/o constipation, cont bowel regimen    #h/o Sacrum/coccyx pressure ulcer, present on admit  - cont daily wound care, cont Santyl    advance directives: FULL CODE  prognosis poor  --> dvt priop - lovenox sub q  dispo - eventual dc to vent faciltity

## 2020-03-13 NOTE — CHART NOTE - NSCHARTNOTEFT_GEN_A_CORE
Called by RN for pt's HR of 119 /55 RR 32 T 98.2 (bladder) Pulse Ox 97% vented.  Pt in no distress.  C/O baseline pain, RN to give tylenol as ordered.  RN to reassess pt and escalate prn. Called by RN for pt's HR of 119 /55 RR 32 T 98.2 (bladder) Pulse Ox 97% vented.  Pt in no distress.  C/O baseline pain, RN to give tylenol as ordered.  Upon reviewing labs, pt is anemic with Hg 7.2/23.5.  Stool negative for occult blood, no obvious bleeding.  Will order stat type and cross with CBC and transfuse prn.  RN to monitor, reassess and escalate prn. Called by RN for pt's HR of 119 /55 RR 32 T 98.2 (bladder) Pulse Ox 97% vented.  Pt in no distress. Upon reviewing labs, pt is anemic with Hg 7.2/23.5.  Stool negative for occult blood, no obvious bleeding.  Will order stat type and cross with CBC and transfuse prn.  RN to monitor, reassess and escalate prn. Called by RN for pt's HR of 119 /55 RR 32 T 98.2 (bladder) Pulse Ox 97% vented.  Pt in no distress. Upon reviewing labs, pt is anemic with Hg 7.2/23.5.  Stool negative for occult blood, no obvious bleeding.  Will order stat type and cross with CBC and transfuse prn.  RN to monitor, reassess and escalate prn.    01:45 Called by RN with lab results.  Pt's Hg/Hct improved to 7.7/25.1.  Pt is anxious as per RN, pt's family by bedside refusing any anxiolytics.  In no respiratory distress.  Will continue to monitor and reassess prn.

## 2020-03-13 NOTE — PROGRESS NOTE ADULT - SUBJECTIVE AND OBJECTIVE BOX
ROMIE VIRAMONTES    [id number]    60y      Female    INTERVAL HPI/OVERNIGHT EVENTS:    REVIEW OF SYSTEMS:    CONSTITUTIONAL: No fever, weight loss, or fatigue  RESPIRATORY: No cough, wheezing, hemoptysis; No shortness of breath  CARDIOVASCULAR: No chest pain, palpitations  GASTROINTESTINAL: No abdominal or epigastric pain. No nausea, vomiting  NEUROLOGICAL: No headaches, memory loss, loss of strength.  MISCELLANEOUS:      Vital Signs Last 24 Hrs  T(C): 37 (13 Mar 2020 08:00), Max: 37 (13 Mar 2020 08:00)  T(F): 98.6 (13 Mar 2020 08:00), Max: 98.6 (13 Mar 2020 08:00)  HR: 136 (13 Mar 2020 10:00) (105 - 136)  BP: 149/114 (13 Mar 2020 10:00) (94/50 - 149/114)  BP(mean): 129 (13 Mar 2020 10:00) (65 - 129)  RR: 42 (13 Mar 2020 10:00) (21 - 46)  SpO2: 94% (13 Mar 2020 10:00) (92% - 100%)    PHYSICAL EXAM:    GENERAL: NAD, well-groomed  HEENT: PERRL, +EOMI  NECK: soft, Supple, No JVD,   CHEST/LUNG: Clear to auscultation bilaterally; No wheezing  HEART: S1S2+, Regular rate and rhythm; No murmurs, rubs, or gallops  ABDOMEN: Soft, Nontender, Nondistended; Bowel sounds present  EXTREMITIES:  2+ Peripheral Pulses, No clubbing, cyanosis, or edema  SKIN: No rashes or lesions  NEURO: AAOX3, no focal deficits, no motor r sensory loss  PSYCH: normal mood      LABS:                        7.5    18.18 )-----------( 637      ( 13 Mar 2020 04:48 )             24.9     03-12    131<L>  |  87<L>  |  27.0<H>  ----------------------------<  157<H>  3.8   |  35.0<H>  |  0.32<L>    Ca    9.2      12 Mar 2020 06:01              MEDICATIONS  (STANDING):  atovaquone Suspension 1500 milliGRAM(s) Oral daily  chlorhexidine 0.12% Liquid 15 milliLiter(s) Oral Mucosa every 12 hours  collagenase Ointment 1 Application(s) Topical daily  enoxaparin Injectable 40 milliGRAM(s) SubCutaneous daily  famotidine    Tablet 20 milliGRAM(s) Oral daily  fentaNYL   Patch  12 MICROgram(s)/Hr 1 Patch Transdermal every 72 hours  fentaNYL   Patch  25 MICROgram(s)/Hr 1 Patch Transdermal every 72 hours  ferrous    sulfate 325 milliGRAM(s) Oral two times a day  mirtazapine 15 milliGRAM(s) Oral at bedtime  OXcarbazepine 600 milliGRAM(s) Oral two times a day  polyethylene glycol 3350 17 Gram(s) Oral at bedtime  predniSONE   Tablet 20 milliGRAM(s) Oral daily  propranolol 20 milliGRAM(s) Oral three times a day  QUEtiapine 25 milliGRAM(s) Oral at bedtime  senna Syrup 10 milliLiter(s) Oral at bedtime  sucralfate suspension 1 Gram(s) Oral every 6 hours    MEDICATIONS  (PRN):  acetaminophen    Suspension .. 650 milliGRAM(s) Oral every 6 hours PRN Temp greater or equal to 38C (100.4F), Mild Pain (1 - 3)  ALBUTerol   0.042% 1.25 milliGRAM(s) Nebulizer four times a day PRN Shortness of Breath and/or Wheezing  aluminum hydroxide/magnesium hydroxide/simethicone Suspension 30 milliLiter(s) Oral every 4 hours PRN Dyspepsia  melatonin 5 milliGRAM(s) Oral at bedtime PRN Insomnia  simethicone drops 40 milliGRAM(s) Oral every 6 hours PRN Gas      RADIOLOGY & ADDITIONAL TESTS: ROMIE VIRAMONTES    [id number]    60y      Female    INTERVAL HPI/OVERNIGHT EVENTS:    off service note:  This is a 61 y/o female with history of severe restrictive interstitial lung disease, s/p resp failure s/p trach 1/29, ventilator dependent, s/p PEA arrest (1/29), RLE DVT and RUE occlusive thrombus, toxic metabolic encephalopathy, severe sepsis secondary to serratia PNA, unspecified pleural/parenchymal bronchiectatic process with pulmonary nodules for which she has opposed steroid therapy (prior), spontaneous right-sided PTX (1/19), stage Peg NHL (s/p chemo/XRT and SCT received '03 at [facility] [place] - in remission) 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 Patient was discharged to [facility], on the day of arrival to [facility], from [facility] in the [facility] Upon arrival to [facility] pt was placed on volume control ventilation at which time she immediately desaturated, became unresponsive and was in acute respiratory distress. Family is currently requesting that patient not return to [facility]. Patient underwent CT scan on 3/11 which showed apical PTX however no surgery was recommended at this time by CTsx. Patient being evaluated for facilities that can accomodate pressure control vent which are her current settings.     Patient seen at bedside with daughter who is visibly upset that patient is not on propranolol. Patient is also agitated but family does not want benzos.     last 24 hours patient is afebrile.     REVIEW OF SYSTEMS:    CONSTITUTIONAL: anxious  RESPIRATORY: No cough, wheezing, hemoptysis; No shortness of breath  CARDIOVASCULAR: No chest pain, palpitations  GASTROINTESTINAL: No abdominal or epigastric pain. No nausea, vomiting  NEUROLOGICAL: No headaches, memory loss, loss of strength.  MISCELLANEOUS:      Vital Signs Last 24 Hrs  T(C): 37 (13 Mar 2020 08:00), Max: 37 (13 Mar 2020 08:00)  T(F): 98.6 (13 Mar 2020 08:00), Max: 98.6 (13 Mar 2020 08:00)  HR: 136 (13 Mar 2020 10:00) (105 - 136)  BP: 149/114 (13 Mar 2020 10:00) (94/50 - 149/114)  BP(mean): 129 (13 Mar 2020 10:00) (65 - 129)  RR: 42 (13 Mar 2020 10:00) (21 - 46)  SpO2: 94% (13 Mar 2020 10:00) (92% - 100%)    PHYSICAL EXAM:    GENERAL: anxious, cachetic, frail, ill appearing  HEENT: peerl  NECK: trach  CHEST/LUNG: no wheezing, diminished  HEART: S1S2+, tachy   ABDOMEN: Soft, thin, peg tube   EXTREMITIES: thin, muscle wasting   SKIN: No rashes or lesions  NEURO: AAOX3,       LABS:                        7.5    18.18 )-----------( 637      ( 13 Mar 2020 04:48 )             24.9     03-12    131<L>  |  87<L>  |  27.0<H>  ----------------------------<  157<H>  3.8   |  35.0<H>  |  0.32<L>    Ca    9.2      12 Mar 2020 06:01      MEDICATIONS  (STANDING):  atovaquone Suspension 1500 milliGRAM(s) Oral daily  chlorhexidine 0.12% Liquid 15 milliLiter(s) Oral Mucosa every 12 hours  collagenase Ointment 1 Application(s) Topical daily  enoxaparin Injectable 40 milliGRAM(s) SubCutaneous daily  famotidine    Tablet 20 milliGRAM(s) Oral daily  fentaNYL   Patch  12 MICROgram(s)/Hr 1 Patch Transdermal every 72 hours  fentaNYL   Patch  25 MICROgram(s)/Hr 1 Patch Transdermal every 72 hours  ferrous    sulfate 325 milliGRAM(s) Oral two times a day  mirtazapine 15 milliGRAM(s) Oral at bedtime  OXcarbazepine 600 milliGRAM(s) Oral two times a day  polyethylene glycol 3350 17 Gram(s) Oral at bedtime  predniSONE   Tablet 20 milliGRAM(s) Oral daily  propranolol 20 milliGRAM(s) Oral three times a day  QUEtiapine 25 milliGRAM(s) Oral at bedtime  senna Syrup 10 milliLiter(s) Oral at bedtime  sucralfate suspension 1 Gram(s) Oral every 6 hours    MEDICATIONS  (PRN):  acetaminophen    Suspension .. 650 milliGRAM(s) Oral every 6 hours PRN Temp greater or equal to 38C (100.4F), Mild Pain (1 - 3)  ALBUTerol   0.042% 1.25 milliGRAM(s) Nebulizer four times a day PRN Shortness of Breath and/or Wheezing  aluminum hydroxide/magnesium hydroxide/simethicone Suspension 30 milliLiter(s) Oral every 4 hours PRN Dyspepsia  melatonin 5 milliGRAM(s) Oral at bedtime PRN Insomnia  simethicone drops 40 milliGRAM(s) Oral every 6 hours PRN Gas      RADIOLOGY & ADDITIONAL TESTS:

## 2020-03-13 NOTE — PROGRESS NOTE ADULT - SUBJECTIVE AND OBJECTIVE BOX
[facility]  INFECTIOUS DISEASES AND INTERNAL MEDICINE at [place]  =======================================================  Chandrakant Honeycutt MD  Diplomates American Board of Internal Medicine and Infectious Diseases  Tel: [phone]      Fax: [phone]  =======================================================    ROMIE VIRAMONTES [id number]    Follow up: ILD  very anxious  daughter at bedside  does not want to try anxiolytics   tmax 99.7    Allergies:  IV Contrast (Anaphylaxis)  lorazepam (Other (Severe))  shellfish. (Anaphylaxis)      Medications:  acetaminophen    Suspension .. 650 milliGRAM(s) Oral every 6 hours PRN  ALBUTerol   0.042% 1.25 milliGRAM(s) Nebulizer four times a day PRN  aluminum hydroxide/magnesium hydroxide/simethicone Suspension 30 milliLiter(s) Oral every 4 hours PRN  atovaquone Suspension 1500 milliGRAM(s) Oral daily  chlorhexidine 0.12% Liquid 15 milliLiter(s) Oral Mucosa every 12 hours  collagenase Ointment 1 Application(s) Topical daily  enoxaparin Injectable 40 milliGRAM(s) SubCutaneous daily  famotidine    Tablet 20 milliGRAM(s) Oral daily  fentaNYL   Patch  12 MICROgram(s)/Hr 1 Patch Transdermal every 72 hours  fentaNYL   Patch  25 MICROgram(s)/Hr 1 Patch Transdermal every 72 hours  ferrous    sulfate 325 milliGRAM(s) Oral two times a day  melatonin 5 milliGRAM(s) Oral at bedtime PRN  mirtazapine 15 milliGRAM(s) Oral at bedtime  OXcarbazepine 600 milliGRAM(s) Oral two times a day  polyethylene glycol 3350 17 Gram(s) Oral at bedtime  predniSONE   Tablet 20 milliGRAM(s) Oral daily  propranolol 20 milliGRAM(s) Oral three times a day  QUEtiapine 25 milliGRAM(s) Oral at bedtime  senna Syrup 10 milliLiter(s) Oral at bedtime  simethicone drops 40 milliGRAM(s) Oral every 6 hours PRN  sucralfate suspension 1 Gram(s) Oral every 6 hours            REVIEW OF SYSTEMS:  CONSTITUTIONAL:  No Fever or chills  HEENT:   No diplopia or blurred vision.  No earache, sore throat or runny nose.  CARDIOVASCULAR:  No pressure, squeezing, strangling, tightness, heaviness or aching about the chest, neck, axilla or epigastrium.  RESPIRATORY:  No cough, shortness of breath  GASTROINTESTINAL:  No nausea, vomiting or diarrhea.  GENITOURINARY:  No dysuria, frequency or urgency. No Blood in urine  MUSCULOSKELETAL:  no joint aches, no muscle pain  SKIN:  No change in skin, hair or nails.  NEUROLOGIC:  No Headaches, seizures or weakness.  PSYCHIATRIC:  No disorder of thought or mood. + anxiety  ENDOCRINE:  No heat or cold intolerance  HEMATOLOGICAL:  No easy bruising or bleeding.            Physical Exam:  ICU Vital Signs Last 24 Hrs  T(C): 37.6 (13 Mar 2020 12:00), Max: 37.6 (13 Mar 2020 12:00)  T(F): 99.7 (13 Mar 2020 12:00), Max: 99.7 (13 Mar 2020 12:00)  HR: 96 (13 Mar 2020 12:43) (94 - 136)  BP: 122/70 (13 Mar 2020 12:00) (99/57 - 149/114)  BP(mean): 89 (13 Mar 2020 12:00) (72 - 129)  ABP: --  ABP(mean): --  RR: 38 (13 Mar 2020 12:00) (21 - 46)  SpO2: 95% (13 Mar 2020 12:43) (92% - 100%)      GEN: NAD, + trach, anxious  HEENT: normocephalic and atraumatic. EOMI. PERRL.  Anicteric   NECK: Supple.   LUNGS: Clear to auscultation.  HEART: Regular rate and rhythm without murmur.  ABDOMEN: Soft, nontender, + peg, and nondistended.  Positive bowel sounds.    : No CVA tenderness  EXTREMITIES: Without any edema.  MSK: no joint swelling  NEUROLOGIC: Cranial nerves II through XII are grossly intact. No focal deficits  PSYCHIATRIC: Appropriate affect .  SKIN: No Rash      Labs:  03-12    131<L>  |  87<L>  |  27.0<H>  ----------------------------<  157<H>  3.8   |  35.0<H>  |  0.32<L>    Ca    9.2      12 Mar 2020 06:01                            7.5    18.18 )-----------( 637      ( 13 Mar 2020 04:48 )             24.9                 CAPILLARY BLOOD GLUCOSE            RECENT CULTURES:  03-12 @ 16:28 .Sputum       Numerous polymorphonuclear leukocytes per low power field  No Squamous epithelial cells per low power field  No organisms seen per oil power field      03-11 @ 08:58 .Urine     <10,000 CFU/mL Normal Urogenital Meme        03-10 @ 23:16 .Blood     No growth at 48 hours        03-10 @ 23:13          NotDete

## 2020-03-13 NOTE — PROGRESS NOTE ADULT - SUBJECTIVE AND OBJECTIVE BOX
PULMONARY PROGRESS NOTE      ROMIE VIRAMONTES  MRN-[id number]    Patient is a 60y old  Female who presents with a chief complaint of Acute respiratory distress (13 Mar 2020 12:44)      INTERVAL HPI/OVERNIGHT EVENTS:    Patient is s/p trach on vent    MEDICATIONS  (STANDING):  atovaquone Suspension 1500 milliGRAM(s) Oral daily  chlorhexidine 0.12% Liquid 15 milliLiter(s) Oral Mucosa every 12 hours  collagenase Ointment 1 Application(s) Topical daily  enoxaparin Injectable 40 milliGRAM(s) SubCutaneous daily  famotidine    Tablet 20 milliGRAM(s) Oral daily  fentaNYL   Patch  12 MICROgram(s)/Hr 1 Patch Transdermal every 72 hours  fentaNYL   Patch  25 MICROgram(s)/Hr 1 Patch Transdermal every 72 hours  ferrous    sulfate 325 milliGRAM(s) Oral two times a day  mirtazapine 15 milliGRAM(s) Oral at bedtime  OXcarbazepine 600 milliGRAM(s) Oral two times a day  polyethylene glycol 3350 17 Gram(s) Oral at bedtime  predniSONE   Tablet 20 milliGRAM(s) Oral daily  propranolol 20 milliGRAM(s) Oral three times a day  QUEtiapine 25 milliGRAM(s) Oral at bedtime  senna Syrup 10 milliLiter(s) Oral at bedtime  sucralfate suspension 1 Gram(s) Oral every 6 hours      MEDICATIONS  (PRN):  acetaminophen    Suspension .. 650 milliGRAM(s) Oral every 6 hours PRN Temp greater or equal to 38C (100.4F), Mild Pain (1 - 3)  ALBUTerol   0.042% 1.25 milliGRAM(s) Nebulizer four times a day PRN Shortness of Breath and/or Wheezing  aluminum hydroxide/magnesium hydroxide/simethicone Suspension 30 milliLiter(s) Oral every 4 hours PRN Dyspepsia  melatonin 5 milliGRAM(s) Oral at bedtime PRN Insomnia  simethicone drops 40 milliGRAM(s) Oral every 6 hours PRN Gas      Allergies    IV Contrast (Anaphylaxis)  shellfish. (Anaphylaxis)    Intolerances    lorazepam (Other (Severe))      PAST MEDICAL & SURGICAL HISTORY:  Interstitial lung disease: on home o2 prn  NHL (non-Hodgkin's lymphoma): Agem 45 sp chemo/rt/stem cell  Transient cerebral ischemia, unspecified type  Mitral prolapse  History of tonsillectomy  History of appendectomy        REVIEW OF SYSTEMS: pt unable to provide      Vital Signs Last 24 Hrs  T(C): 37.6 (13 Mar 2020 12:00), Max: 37.6 (13 Mar 2020 12:00)  T(F): 99.7 (13 Mar 2020 12:00), Max: 99.7 (13 Mar 2020 12:00)  HR: 96 (13 Mar 2020 12:43) (94 - 136)  BP: 122/70 (13 Mar 2020 12:00) (99/57 - 149/114)  BP(mean): 89 (13 Mar 2020 12:00) (72 - 129)  RR: 38 (13 Mar 2020 12:00) (21 - 46)  SpO2: 95% (13 Mar 2020 12:43) (92% - 100%)    PHYSICAL EXAMINATION:    GENERAL: The patient is s/p trach in no apparent distress.     HEENT: Head is normocephalic and atraumatic.    NECK: + trach.    LUNGS: Mild b/ rales and rhonchi; no wheeze; respirations unlabored    HEART: Regular rate and rhythm without murmur.    ABDOMEN: Soft, nontender, and nondistended.      EXTREMITIES: Without any cyanosis, clubbing, rash, lesions or edema.      LABS:                        7.5    18.18 )-----------( 637      ( 13 Mar 2020 04:48 )             24.9     03-12    131<L>  |  87<L>  |  27.0<H>  ----------------------------<  157<H>  3.8   |  35.0<H>  |  0.32<L>    Ca    9.2      12 Mar 2020 06:01          Serum Pro-Brain Natriuretic Peptide: 885 pg/mL (03-12-20 @ 06:02)      Procalcitonin, Serum: 0.16 ng/mL (03-11-20 @ 14:33)      MICROBIOLOGY:    Rapid Respiratory Viral Panel (03.10.20 @ 23:13)    Rapid RVP Result: NotDetec: This Respiratory Panel does NOT detect the 2019 novel coronavirus  (2019-nCoV) involved with the outbreak originating in [facility].  This Respiratory Panel uses polymerase chain reaction (PCR) to detect for  adenovirus; coronavirus (HKU1, NL63, 229E, OC43); human metapneumovirus  (hMPV); human enterovirus/rhinovirus (Entero/RV); influenza A; influenza  A/H1; influenza A/H3; influenza A/H1-2009; influenza B; parainfluenza  viruses 1, 2, 3, 4; respiratory syncytial virus; Mycoplasma pneumoniae;  and Chlamydophila pneumoniae.        Culture - Sputum . (03.12.20 @ 16:28)    Gram Stain:   Numerous polymorphonuclear leukocytes per low power field  No Squamous epithelial cells per low power field  No organisms seen per oil power field    Specimen Source: .Sputum    RADIOLOGY & ADDITIONAL STUDIES:     EXAM:  XR CHEST PORTABLE URGENT 1V                          PROCEDURE DATE:  03/12/2020          INTERPRETATION:  Portable chest radiograph        CLINICAL INFORMATION:   Pneumothorax. Follow-up.    TECHNIQUE:  Portable  AP view of the chest was obtained.    COMPARISON: 3/10/2020 available for review.    FINDINGS: There is a persistent RIGHT apical lung 10% pneumothorax without midline shift.  Chronic interstitial lung lung disease unchanged. Please see CT scan 3/11/2020 for further assessment..  There is mild coronary megaly.. Visualized osseous structures are intact.    Tracheostomy tube in place    IMPRESSION:   Stable RIGHT apical 10% pneumothorax. Chronic interstitial lung disease. Tracheostomy tube in place..          KAMAR JIMENEZ M.D., ATTENDING RADIOLOGIST  This document has been electronically signed. Mar 12 2020 12:22PM         EXAM:  US DPLX LWR EXT VEINS COMPL BI                          PROCEDURE DATE:  03/12/2020          INTERPRETATION:  CLINICAL INFORMATION: Difficulty breathing.    COMPARISON: Bilateral lower extremity venous Doppler 1/23/2020    TECHNIQUE: Duplexsonography of the BILATERAL LOWER extremity veins with color and spectral Doppler, with and without compression.      FINDINGS:    There is normal compressibility of the bilateral common femoral, femoral and popliteal veins.     Doppler examination shows normal spontaneous and phasic flow.    No calf vein thrombosis is detected.    IMPRESSION:     No evidence of deep venous thrombosis in either lower extremity.          REJI PEREZ   This document has been electronically signed.Mar 12 2020  1:38PM      ECHO 1/11/20:    CONCLUSIONS:    1. Normal left ventricular systolic function. The ejection fraction is approximately 65%.  2. Thickened trileaflet aortic valve with no significant aortic insufficiency.  3. Thickened mitral valve with mild posterior leaflet prolapse. Moderate mitral insufficiency.  4. Mild left atrial enlargement.  5. Normal right ventricular size and systolic function.  6. Grade 1 diastolic dysfunction.  7. Right ventricular systolic pressure equals 48 mmHg, assuming a right atrial pressure of 8 mmHg, consistent with mild pulmonary hypertension.          MOOK RUBIO M.D., ATTENDING CARDIOLOGIST  This document has been electronically signed. Jan 11 2020  8:28AM

## 2020-03-13 NOTE — PROGRESS NOTE ADULT - ASSESSMENT
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 Patient was discharged to [facility], on the day of arrival to [facility], from [facility]. Upon arrival to [facility] pt was placed on volume control ventilation at which time she immediately desaturated, became unresponsive and in acute respiratory distress.       Overall ILD, tracheostomy, leukocytosis  - was not discharged on antibiotics per MD paperwork  - afebrile, leukocytosis on steroids  - CT chest with chronic lung findings  - Blood cultures (-)  - Sputum cx negative  - Procalcitonin level 0.16  - Ua + in the setting of chronic avendano, UCX negative  - c.w atovaquone ppx  - Observe off abx  - Trend Fever  - Trend Leukocytosis    d/w patient, daughter 60 y/o female with history of severe restrictive interstitial lung disease, s/p resp failure s/p trach 1/29, ventilator dependent, s/p PEA arrest (1/29), RLE DVT and RUE occlusive thrombus, toxic metabolic encephalopathy, severe sepsis secondary to serratia PNA, unspecified pleural/parenchymal bronchiectatic process with pulmonary nodules for which she has opposed steroid therapy (prior), spontaneous right-sided PTX (1/19), stage Peg NHL (s/p chemo/XRT and [place] [facility] [place]) 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 Patient was discharged to [facility], on the day of arrival to [facility], from [facility]. Upon arrival to [facility] pt was placed on volume control ventilation at which time she immediately desaturated, became unresponsive and in acute respiratory distress.       Overall ILD, tracheostomy, leukocytosis  - was not discharged on antibiotics per [place] paperwork  - afebrile, leukocytosis on steroids  - CT chest with chronic lung findings  - Blood cultures (-)  - Sputum cx negative  - Procalcitonin level 0.16  - Ua + in the setting of chronic avendano, UCX negative  - c.w atovaquone ppx  - Observe off abx  - Trend Fever  - Trend Leukocytosis    d/w patient, daughter    Please call with questions.

## 2020-03-13 NOTE — PROGRESS NOTE ADULT - ASSESSMENT
Chronic hypoxemic and hypercapnic respiratory failure  H/o CVA  Chronic R apical Ptx  ILD, NSIP like with peribronchial GG/ consolidation, traction bronchiectasis  currently stable on PC vent  Hx Upper extremity DVT related to catheter which has been removed, current duplex negative bilat UE  thus off  full AC  Hct decreased     Rec:    Continue vent support with chronic PTX  Follow HCT  Placed on prednisone 20 mg and atovaquone from [facility], no definite etiology  Minimal  procalcitonin  Off abx per ID  Minimal BNP, last echocardiogram with normal LV  Attempt to find NH that take PC vent prior to any further adjustments  Prognosis remains extremely guarded  Needs palliative input, goals of care  Overall poor prognosis

## 2020-03-14 LAB
ALBUMIN SERPL ELPH-MCNC: 2.9 G/DL — LOW (ref 3.3–5.2)
ALP SERPL-CCNC: 179 U/L — HIGH (ref 40–120)
ALT FLD-CCNC: 33 U/L — HIGH
ANION GAP SERPL CALC-SCNC: 9 MMOL/L — SIGNIFICANT CHANGE UP (ref 5–17)
AST SERPL-CCNC: 26 U/L — SIGNIFICANT CHANGE UP
BASE EXCESS BLDA CALC-SCNC: 16.1 MMOL/L — HIGH (ref -3–3)
BASE EXCESS BLDA CALC-SCNC: 17.4 MMOL/L — HIGH (ref -3–3)
BASOPHILS # BLD AUTO: 0.07 K/UL — SIGNIFICANT CHANGE UP (ref 0–0.2)
BASOPHILS NFR BLD AUTO: 0.3 % — SIGNIFICANT CHANGE UP (ref 0–2)
BILIRUB SERPL-MCNC: <0.2 MG/DL — LOW (ref 0.4–2)
BLOOD GAS COMMENTS ARTERIAL: SIGNIFICANT CHANGE UP
BLOOD GAS COMMENTS ARTERIAL: SIGNIFICANT CHANGE UP
BUN SERPL-MCNC: 21 MG/DL — HIGH (ref 8–20)
CALCIUM SERPL-MCNC: 9.5 MG/DL — SIGNIFICANT CHANGE UP (ref 8.6–10.2)
CHLORIDE SERPL-SCNC: 84 MMOL/L — LOW (ref 98–107)
CO2 SERPL-SCNC: 37 MMOL/L — HIGH (ref 22–29)
CREAT SERPL-MCNC: 0.25 MG/DL — LOW (ref 0.5–1.3)
CULTURE RESULTS: SIGNIFICANT CHANGE UP
EOSINOPHIL # BLD AUTO: 0.66 K/UL — HIGH (ref 0–0.5)
EOSINOPHIL NFR BLD AUTO: 3 % — SIGNIFICANT CHANGE UP (ref 0–6)
GAS PNL BLDA: SIGNIFICANT CHANGE UP
GAS PNL BLDA: SIGNIFICANT CHANGE UP
GLUCOSE BLDC GLUCOMTR-MCNC: 148 MG/DL — HIGH (ref 70–99)
GLUCOSE SERPL-MCNC: 127 MG/DL — HIGH (ref 70–99)
HCO3 BLDA-SCNC: 40 MMOL/L — HIGH (ref 20–26)
HCO3 BLDA-SCNC: 41 MMOL/L — HIGH (ref 20–26)
HCT VFR BLD CALC: 28 % — LOW (ref 34.5–45)
HGB BLD-MCNC: 8.5 G/DL — LOW (ref 11.5–15.5)
HOROWITZ INDEX BLDA+IHG-RTO: SIGNIFICANT CHANGE UP
HOROWITZ INDEX BLDA+IHG-RTO: SIGNIFICANT CHANGE UP
IMM GRANULOCYTES NFR BLD AUTO: 1.4 % — SIGNIFICANT CHANGE UP (ref 0–1.5)
LYMPHOCYTES # BLD AUTO: 0.67 K/UL — LOW (ref 1–3.3)
LYMPHOCYTES # BLD AUTO: 3.1 % — LOW (ref 13–44)
MAGNESIUM SERPL-MCNC: 2.1 MG/DL — SIGNIFICANT CHANGE UP (ref 1.6–2.6)
MCHC RBC-ENTMCNC: 26.6 PG — LOW (ref 27–34)
MCHC RBC-ENTMCNC: 30.4 GM/DL — LOW (ref 32–36)
MCV RBC AUTO: 87.8 FL — SIGNIFICANT CHANGE UP (ref 80–100)
MONOCYTES # BLD AUTO: 1.33 K/UL — HIGH (ref 0–0.9)
MONOCYTES NFR BLD AUTO: 6.1 % — SIGNIFICANT CHANGE UP (ref 2–14)
NEUTROPHILS # BLD AUTO: 18.66 K/UL — HIGH (ref 1.8–7.4)
NEUTROPHILS NFR BLD AUTO: 86.1 % — HIGH (ref 43–77)
PCO2 BLDA: 91 MMHG — CRITICAL HIGH (ref 35–45)
PCO2 BLDA: 97 MMHG — CRITICAL HIGH (ref 35–45)
PH BLDA: 7.29 — LOW (ref 7.35–7.45)
PH BLDA: 7.3 — LOW (ref 7.35–7.45)
PLATELET # BLD AUTO: 733 K/UL — HIGH (ref 150–400)
PO2 BLDA: 85 MMHG — SIGNIFICANT CHANGE UP (ref 83–108)
PO2 BLDA: 92 MMHG — SIGNIFICANT CHANGE UP (ref 83–108)
POTASSIUM SERPL-MCNC: 4.3 MMOL/L — SIGNIFICANT CHANGE UP (ref 3.5–5.3)
POTASSIUM SERPL-SCNC: 4.3 MMOL/L — SIGNIFICANT CHANGE UP (ref 3.5–5.3)
PROT SERPL-MCNC: 6 G/DL — LOW (ref 6.6–8.7)
RBC # BLD: 3.19 M/UL — LOW (ref 3.8–5.2)
RBC # FLD: 17.1 % — HIGH (ref 10.3–14.5)
SAO2 % BLDA: 96 % — SIGNIFICANT CHANGE UP (ref 95–99)
SAO2 % BLDA: 97 % — SIGNIFICANT CHANGE UP (ref 95–99)
SODIUM SERPL-SCNC: 130 MMOL/L — LOW (ref 135–145)
SPECIMEN SOURCE: SIGNIFICANT CHANGE UP
WBC # BLD: 21.69 K/UL — HIGH (ref 3.8–10.5)
WBC # FLD AUTO: 21.69 K/UL — HIGH (ref 3.8–10.5)

## 2020-03-14 PROCEDURE — 99233 SBSQ HOSP IP/OBS HIGH 50: CPT

## 2020-03-14 RX ORDER — ASCORBIC ACID 60 MG
500 TABLET,CHEWABLE ORAL DAILY
Refills: 0 | Status: DISCONTINUED | OUTPATIENT
Start: 2020-03-14 | End: 2020-03-21

## 2020-03-14 RX ORDER — CHLORHEXIDINE GLUCONATE 213 G/1000ML
15 SOLUTION TOPICAL EVERY 12 HOURS
Refills: 0 | Status: DISCONTINUED | OUTPATIENT
Start: 2020-03-14 | End: 2020-03-21

## 2020-03-14 RX ORDER — CHLORHEXIDINE GLUCONATE 213 G/1000ML
15 SOLUTION TOPICAL EVERY 12 HOURS
Refills: 0 | Status: DISCONTINUED | OUTPATIENT
Start: 2020-03-14 | End: 2020-03-14

## 2020-03-14 RX ADMIN — OXCARBAZEPINE 600 MILLIGRAM(S): 300 TABLET, FILM COATED ORAL at 05:28

## 2020-03-14 RX ADMIN — FENTANYL CITRATE 1 PATCH: 50 INJECTION INTRAVENOUS at 02:04

## 2020-03-14 RX ADMIN — FENTANYL CITRATE 1 PATCH: 50 INJECTION INTRAVENOUS at 08:00

## 2020-03-14 RX ADMIN — OXCARBAZEPINE 600 MILLIGRAM(S): 300 TABLET, FILM COATED ORAL at 16:47

## 2020-03-14 RX ADMIN — ATOVAQUONE 1500 MILLIGRAM(S): 750 SUSPENSION ORAL at 11:01

## 2020-03-14 RX ADMIN — FAMOTIDINE 20 MILLIGRAM(S): 10 INJECTION INTRAVENOUS at 11:00

## 2020-03-14 RX ADMIN — CHLORHEXIDINE GLUCONATE 15 MILLILITER(S): 213 SOLUTION TOPICAL at 05:28

## 2020-03-14 RX ADMIN — Medication 650 MILLIGRAM(S): at 00:40

## 2020-03-14 RX ADMIN — ENOXAPARIN SODIUM 40 MILLIGRAM(S): 100 INJECTION SUBCUTANEOUS at 11:00

## 2020-03-14 RX ADMIN — FENTANYL CITRATE 1 PATCH: 50 INJECTION INTRAVENOUS at 19:19

## 2020-03-14 RX ADMIN — SIMETHICONE 40 MILLIGRAM(S): 80 TABLET, CHEWABLE ORAL at 03:44

## 2020-03-14 RX ADMIN — Medication 20 MILLIGRAM(S): at 05:28

## 2020-03-14 RX ADMIN — Medication 650 MILLIGRAM(S): at 00:05

## 2020-03-14 RX ADMIN — Medication 1 APPLICATION(S): at 11:01

## 2020-03-14 RX ADMIN — Medication 1 GRAM(S): at 05:28

## 2020-03-14 RX ADMIN — Medication 325 MILLIGRAM(S): at 05:28

## 2020-03-14 RX ADMIN — CHLORHEXIDINE GLUCONATE 15 MILLILITER(S): 213 SOLUTION TOPICAL at 16:46

## 2020-03-14 RX ADMIN — Medication 1 GRAM(S): at 11:01

## 2020-03-14 RX ADMIN — Medication 1 GRAM(S): at 23:00

## 2020-03-14 RX ADMIN — Medication 325 MILLIGRAM(S): at 16:47

## 2020-03-14 RX ADMIN — FENTANYL CITRATE 1 PATCH: 50 INJECTION INTRAVENOUS at 02:00

## 2020-03-14 RX ADMIN — Medication 1 GRAM(S): at 17:04

## 2020-03-14 RX ADMIN — Medication 1 TABLET(S): at 16:45

## 2020-03-14 RX ADMIN — FENTANYL CITRATE 1 PATCH: 50 INJECTION INTRAVENOUS at 02:03

## 2020-03-14 RX ADMIN — Medication 500 MILLIGRAM(S): at 16:45

## 2020-03-14 NOTE — PROGRESS NOTE ADULT - ASSESSMENT
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 Patient was discharged to [facility], on the day of arrival to [facility], from [facility]. Upon arrival to [facility] pt was placed on volume control ventilation at which time she desaturated, became unresponsive and in acute respiratory distress.  Admitted for evaluation, seen by CT Surgery, Pulmonary and critical care.    #Acute on chronic respiratory failure; Small R Pneumothorax,stable :  Remains on pressure control ventilation.  She apparently could not tolerate volume control at Vent facility.  Continue Vent support.  nebulizers prn.  Extensively discussed plan of care with pulmonary.  Cont prednisone 20mg for ILD with Atovaquone for PCP prophylaxis.    #Acute on Chronic blood loss anemia - Stool OB was negative.  Monitor H/H, appears to be stable.  #Right Brachial Vein Thrombosis & Left Femoral DVT, present on arrival; due to line in that arm previously; since removed.  Repeat U/S showed no DVT in UE/ LEs.  Currently off anticoagulation due to bleed.  Family to attempt to obtain information of hospital course at [facility].  # Chronic Pain - Controlled on Fentanyl.   #Severe protein calorie Malnutrition - - cont TF: Nepro @45ml/hr for 24hrs.  #H/o Constipation, cont bowel regimen  #h/o Sacrum/coccyx pressure ulcer, present on admission - cont daily wound care, cont Santyl  # DVT Prophylaxis - Lovenox subcut    / consult - for discharge arrangement for a NH than can ventilate patient with use of pressure control ventilation.

## 2020-03-14 NOTE — PROGRESS NOTE ADULT - SUBJECTIVE AND OBJECTIVE BOX
Patient: ROMIE VIRAMONTES [id number] 60y Female                           Internal Medicine Hospitalist Progress Note    Seen in MICU.  On vent support.    ____________________PHYSICAL EXAM:  GENERAL:  NAD, Awake.  Does not maintain eye contact.    HEENT: NCAT  CARDIOVASCULAR:  S1, S2  LUNGS: CTAB  ABDOMEN:  soft, (-) tenderness, (-) distension, (+) bowel sounds, (-) guarding, (-) rebound (-) rigidity  EXTREMITIES:  no cyanosis / clubbing / edema.   NEURO: able to spontaneously move RUE / RLE.   ____________________     VITALS:  Vital Signs Last 24 Hrs  T(C): 36.9 (14 Mar 2020 08:00), Max: 37.6 (13 Mar 2020 12:00)  T(F): 98.4 (14 Mar 2020 08:00), Max: 99.7 (13 Mar 2020 12:00)  HR: 74 (14 Mar 2020 08:09) (73 - 136)  BP: 111/59 (14 Mar 2020 08:00) (99/56 - 162/77)  BP(mean): 79 (14 Mar 2020 08:00) (71 - 129)  RR: 26 (14 Mar 2020 08:00) (26 - 44)  SpO2: 94% (14 Mar 2020 08:09) (93% - 100%) Daily     Daily   CAPILLARY BLOOD GLUCOSE        I&O's Summary    13 Mar 2020 07:01  -  14 Mar 2020 07:00  --------------------------------------------------------  IN: 1680 mL / OUT: 905 mL / NET: 775 mL    14 Mar 2020 07:01  -  14 Mar 2020 09:35  --------------------------------------------------------  IN: 45 mL / OUT: 35 mL / NET: 10 mL          BACKGROUND:  HEALTH ISSUES - PROBLEM Dx:  Interstitial lung disease: Interstitial lung disease  Pneumothorax, unspecified type: Pneumothorax, unspecified type  Acute deep vein thrombosis (DVT) of other vein of right upper extremity: Acute deep vein thrombosis (DVT) of other vein of right upper extremity  Hypoxemic respiratory failure, chronic: Hypoxemic respiratory failure, chronic  Hypercapnic respiratory failure, chronic: Hypercapnic respiratory failure, chronic  Hypercapnia: Hypercapnia  Lung infiltrate: Lung infiltrate  Ventilator dependence: Ventilator dependence  Ventilatory failure: Ventilatory failure        Allergies    IV Contrast (Anaphylaxis)  shellfish. (Anaphylaxis)    Intolerances    lorazepam (Other (Severe))    PAST MEDICAL & SURGICAL HISTORY:  Interstitial lung disease: on home o2 prn  NHL (non-Hodgkin's lymphoma): Agem 43 sp chemo/rt/stem cell  Transient cerebral ischemia, unspecified type  Mitral prolapse  History of tonsillectomy  History of appendectomy        LABS:                        8.5    21.69 )-----------( 733      ( 14 Mar 2020 04:27 )             28.0       Culture - Sputum (collected 12 Mar 2020 16:28)  Source: .Sputum  Gram Stain (12 Mar 2020 23:22):    Numerous polymorphonuclear leukocytes per low power field    No Squamous epithelial cells per low power field    No organisms seen per oil power field  Preliminary Report (13 Mar 2020 20:27):    Normal Respiratory Meme absent    03-14    130<L>  |  84<L>  |  21.0<H>  ----------------------------<  127<H>  4.3   |  37.0<H>  |  0.25<L>    Ca    9.5      14 Mar 2020 04:27  Mg     2.1     03-14    TPro  6.0<L>  /  Alb  2.9<L>  /  TBili  <0.2<L>  /  DBili  x   /  AST  26  /  ALT  33<H>  /  AlkPhos  179<H>  03-14      LIVER FUNCTIONS - ( 14 Mar 2020 04:27 )  Alb: 2.9 g/dL / Pro: 6.0 g/dL / ALK PHOS: 179 U/L / ALT: 33 U/L / AST: 26 U/L / GGT: x                 Culture - Sputum (collected 12 Mar 2020 16:28)  Source: .Sputum  Gram Stain (12 Mar 2020 23:22):    Numerous polymorphonuclear leukocytes per low power field    No Squamous epithelial cells per low power field    No organisms seen per oil power field  Preliminary Report (13 Mar 2020 20:27):    Normal Respiratory Meme absent          MEDICATIONS:  MEDICATIONS  (STANDING):  atovaquone Suspension 1500 milliGRAM(s) Oral daily  chlorhexidine 0.12% Liquid 15 milliLiter(s) Oral Mucosa every 12 hours  collagenase Ointment 1 Application(s) Topical daily  enoxaparin Injectable 40 milliGRAM(s) SubCutaneous daily  famotidine    Tablet 20 milliGRAM(s) Oral daily  fentaNYL   Patch  12 MICROgram(s)/Hr 1 Patch Transdermal every 72 hours  fentaNYL   Patch  25 MICROgram(s)/Hr 1 Patch Transdermal every 72 hours  ferrous    sulfate 325 milliGRAM(s) Oral two times a day  mirtazapine 15 milliGRAM(s) Oral at bedtime  OXcarbazepine 600 milliGRAM(s) Oral two times a day  polyethylene glycol 3350 17 Gram(s) Oral at bedtime  predniSONE   Tablet 20 milliGRAM(s) Oral daily  propranolol 20 milliGRAM(s) Oral three times a day  QUEtiapine 25 milliGRAM(s) Oral at bedtime  senna Syrup 10 milliLiter(s) Oral at bedtime  sucralfate suspension 1 Gram(s) Oral every 6 hours    MEDICATIONS  (PRN):  acetaminophen    Suspension .. 650 milliGRAM(s) Oral every 6 hours PRN Temp greater or equal to 38C (100.4F), Mild Pain (1 - 3)  ALBUTerol   0.042% 1.25 milliGRAM(s) Nebulizer four times a day PRN Shortness of Breath and/or Wheezing  aluminum hydroxide/magnesium hydroxide/simethicone Suspension 30 milliLiter(s) Oral every 4 hours PRN Dyspepsia  melatonin 5 milliGRAM(s) Oral at bedtime PRN Insomnia  simethicone drops 40 milliGRAM(s) Oral every 6 hours PRN Gas

## 2020-03-14 NOTE — CHART NOTE - NSCHARTNOTEFT_GEN_A_CORE
elert: patient appears more lethargic then baseline while on Ventilator PC 24/5 40% IT 0.8. with minute ventilation <8, TV low 250-300. stat ABG ordered. patient seen and evaluated via video conference, family at bedside who reported that she is more lethargic most of the day. patient is known to me from the day of admission into the hospital at which time she was able to mouth her concerns and was alert and interactive at that time. Currently she appears diaphoretic and not responding to verbal commands as she does at baseline. ABG reviewed with bedside RRT (acute on chronic hypercapnic respiratory failure noted). Ventilator settings changed to PC 28/5 40% IT 1.0 RR 28. MV 9, TV mid 300's. finger stick glucose 148. repeat ABG ordered. bedside RN, RRT, PA and patient family updated with new plan of care.

## 2020-03-15 LAB
ANION GAP SERPL CALC-SCNC: 9 MMOL/L — SIGNIFICANT CHANGE UP (ref 5–17)
BASE EXCESS BLDA CALC-SCNC: 15.3 MMOL/L — HIGH (ref -3–3)
BASE EXCESS BLDA CALC-SCNC: 15.9 MMOL/L — HIGH (ref -3–3)
BLOOD GAS COMMENTS ARTERIAL: SIGNIFICANT CHANGE UP
BLOOD GAS COMMENTS ARTERIAL: SIGNIFICANT CHANGE UP
BUN SERPL-MCNC: 35 MG/DL — HIGH (ref 8–20)
CALCIUM SERPL-MCNC: 9.2 MG/DL — SIGNIFICANT CHANGE UP (ref 8.6–10.2)
CHLORIDE SERPL-SCNC: 88 MMOL/L — LOW (ref 98–107)
CO2 SERPL-SCNC: 37 MMOL/L — HIGH (ref 22–29)
CREAT SERPL-MCNC: 0.26 MG/DL — LOW (ref 0.5–1.3)
GAS PNL BLDA: SIGNIFICANT CHANGE UP
GAS PNL BLDA: SIGNIFICANT CHANGE UP
GLUCOSE SERPL-MCNC: 117 MG/DL — HIGH (ref 70–99)
HCO3 BLDA-SCNC: 39 MMOL/L — HIGH (ref 20–26)
HCO3 BLDA-SCNC: 40 MMOL/L — HIGH (ref 20–26)
HCT VFR BLD CALC: 25.2 % — LOW (ref 34.5–45)
HGB BLD-MCNC: 7.3 G/DL — LOW (ref 11.5–15.5)
HOROWITZ INDEX BLDA+IHG-RTO: 0.3 — SIGNIFICANT CHANGE UP
HOROWITZ INDEX BLDA+IHG-RTO: SIGNIFICANT CHANGE UP
MCHC RBC-ENTMCNC: 26.3 PG — LOW (ref 27–34)
MCHC RBC-ENTMCNC: 29 GM/DL — LOW (ref 32–36)
MCV RBC AUTO: 90.6 FL — SIGNIFICANT CHANGE UP (ref 80–100)
OSMOLALITY UR: 683 MOSM/KG — SIGNIFICANT CHANGE UP (ref 300–1000)
PCO2 BLDA: 74 MMHG — CRITICAL HIGH (ref 35–45)
PCO2 BLDA: 76 MMHG — CRITICAL HIGH (ref 35–45)
PH BLDA: 7.36 — SIGNIFICANT CHANGE UP (ref 7.35–7.45)
PH BLDA: 7.38 — SIGNIFICANT CHANGE UP (ref 7.35–7.45)
PLATELET # BLD AUTO: 741 K/UL — HIGH (ref 150–400)
PO2 BLDA: 49 MMHG — CRITICAL LOW (ref 83–108)
PO2 BLDA: 63 MMHG — LOW (ref 83–108)
POTASSIUM SERPL-MCNC: 4.7 MMOL/L — SIGNIFICANT CHANGE UP (ref 3.5–5.3)
POTASSIUM SERPL-SCNC: 4.7 MMOL/L — SIGNIFICANT CHANGE UP (ref 3.5–5.3)
RBC # BLD: 2.78 M/UL — LOW (ref 3.8–5.2)
RBC # FLD: 17 % — HIGH (ref 10.3–14.5)
SAO2 % BLDA: 82 % — LOW (ref 95–99)
SAO2 % BLDA: 92 % — LOW (ref 95–99)
SODIUM SERPL-SCNC: 134 MMOL/L — LOW (ref 135–145)
SODIUM UR-SCNC: 31 MMOL/L — SIGNIFICANT CHANGE UP
WBC # BLD: 21.1 K/UL — HIGH (ref 3.8–10.5)
WBC # FLD AUTO: 21.1 K/UL — HIGH (ref 3.8–10.5)

## 2020-03-15 PROCEDURE — 99233 SBSQ HOSP IP/OBS HIGH 50: CPT

## 2020-03-15 PROCEDURE — 99497 ADVNCD CARE PLAN 30 MIN: CPT

## 2020-03-15 PROCEDURE — 71045 X-RAY EXAM CHEST 1 VIEW: CPT | Mod: 26,76

## 2020-03-15 PROCEDURE — 99291 CRITICAL CARE FIRST HOUR: CPT

## 2020-03-15 RX ORDER — OXYCODONE HYDROCHLORIDE 5 MG/1
10 TABLET ORAL EVERY 6 HOURS
Refills: 0 | Status: DISCONTINUED | OUTPATIENT
Start: 2020-03-15 | End: 2020-03-17

## 2020-03-15 RX ORDER — OXYCODONE HYDROCHLORIDE 5 MG/1
5 TABLET ORAL EVERY 6 HOURS
Refills: 0 | Status: DISCONTINUED | OUTPATIENT
Start: 2020-03-15 | End: 2020-03-17

## 2020-03-15 RX ORDER — SODIUM CHLORIDE 9 MG/ML
1000 INJECTION INTRAMUSCULAR; INTRAVENOUS; SUBCUTANEOUS ONCE
Refills: 0 | Status: COMPLETED | OUTPATIENT
Start: 2020-03-15 | End: 2020-03-15

## 2020-03-15 RX ADMIN — Medication 1 GRAM(S): at 17:03

## 2020-03-15 RX ADMIN — Medication 1 GRAM(S): at 11:02

## 2020-03-15 RX ADMIN — CHLORHEXIDINE GLUCONATE 15 MILLILITER(S): 213 SOLUTION TOPICAL at 17:03

## 2020-03-15 RX ADMIN — OXYCODONE HYDROCHLORIDE 10 MILLIGRAM(S): 5 TABLET ORAL at 23:34

## 2020-03-15 RX ADMIN — OXCARBAZEPINE 600 MILLIGRAM(S): 300 TABLET, FILM COATED ORAL at 05:04

## 2020-03-15 RX ADMIN — OXYCODONE HYDROCHLORIDE 5 MILLIGRAM(S): 5 TABLET ORAL at 14:30

## 2020-03-15 RX ADMIN — Medication 5 MILLIGRAM(S): at 22:38

## 2020-03-15 RX ADMIN — ENOXAPARIN SODIUM 40 MILLIGRAM(S): 100 INJECTION SUBCUTANEOUS at 11:02

## 2020-03-15 RX ADMIN — Medication 500 MILLIGRAM(S): at 11:02

## 2020-03-15 RX ADMIN — CHLORHEXIDINE GLUCONATE 15 MILLILITER(S): 213 SOLUTION TOPICAL at 05:04

## 2020-03-15 RX ADMIN — Medication 1 GRAM(S): at 23:59

## 2020-03-15 RX ADMIN — Medication 1 TABLET(S): at 11:02

## 2020-03-15 RX ADMIN — Medication 1 APPLICATION(S): at 11:03

## 2020-03-15 RX ADMIN — Medication 325 MILLIGRAM(S): at 17:03

## 2020-03-15 RX ADMIN — Medication 325 MILLIGRAM(S): at 05:04

## 2020-03-15 RX ADMIN — FAMOTIDINE 20 MILLIGRAM(S): 10 INJECTION INTRAVENOUS at 11:02

## 2020-03-15 RX ADMIN — OXYCODONE HYDROCHLORIDE 5 MILLIGRAM(S): 5 TABLET ORAL at 14:00

## 2020-03-15 RX ADMIN — FENTANYL CITRATE 1 PATCH: 50 INJECTION INTRAVENOUS at 07:10

## 2020-03-15 RX ADMIN — Medication 1 GRAM(S): at 05:04

## 2020-03-15 RX ADMIN — Medication 20 MILLIGRAM(S): at 05:04

## 2020-03-15 RX ADMIN — SENNA PLUS 10 MILLILITER(S): 8.6 TABLET ORAL at 22:34

## 2020-03-15 RX ADMIN — OXYCODONE HYDROCHLORIDE 10 MILLIGRAM(S): 5 TABLET ORAL at 22:34

## 2020-03-15 RX ADMIN — SODIUM CHLORIDE 1000 MILLILITER(S): 9 INJECTION INTRAMUSCULAR; INTRAVENOUS; SUBCUTANEOUS at 00:22

## 2020-03-15 RX ADMIN — OXCARBAZEPINE 600 MILLIGRAM(S): 300 TABLET, FILM COATED ORAL at 17:03

## 2020-03-15 RX ADMIN — ATOVAQUONE 1500 MILLIGRAM(S): 750 SUSPENSION ORAL at 11:02

## 2020-03-15 NOTE — CHART NOTE - NSCHARTNOTEFT_GEN_A_CORE
eLert patient is now awake and alert following commands. tachypneic and appears anxious. advised to monitor at his time, as patient has been lethargic for past 2 days. no changes to plan or medications.

## 2020-03-15 NOTE — PROGRESS NOTE ADULT - ASSESSMENT
Chronic hypoxemic and hypercapnic respiratory failure  H/o CVA  Chronic R apical Ptx  ILD, NSIP like with peribronchial GG/ consolidation, traction bronchiectasis  currently stable on PC vent;  cc which is improved  Hx Upper extremity DVT related to catheter which has been removed, current duplex negative bilat UE  thus off  full AC  Anemic  Leukocytosis  Current ABG with compensated hypercarbia  CXR done this AM - await results but appears pt may be developing increased bibasilar opacities/infiltrates    Rec:    Continue vent support with chronic PTX  Follow HCT  Placed on prednisone 20 mg and atovaquone from [facility]  Follow wbc  Off abx per ID but consider re-evaluation given persistent leukocytosis and possible worsening bibasilar opacities  Minimal BNP, last echocardiogram with normal LV  Attempt to find NH that take PC vent prior to any further adjustments  Prognosis remains extremely guarded  Needs palliative input, goals of care  Overall poor prognosis  Pt remains critically ill    > 35 min spent in the evaluation and care of this critically ill pt

## 2020-03-15 NOTE — CONSULT NOTE ADULT - SUBJECTIVE AND OBJECTIVE BOX
Patient is a 60y old  Female who presents with a chief complaint of Acute respiratory distress (14 Mar 2020 09:34)      BRIEF HOSPITAL COURSE: 58 yo female with PMHx unspecified pleural/parenchymal bronchiectatic process with pulmonary nodules, severe restrictive interstitial lung disease with chronic respiratory failure with spontaneous right-sided PTX (1/19) s/p trach 1/29, ventilator dependent, complicated by PEA cardiac arrest (1/29), RLE DVT and RUE occlusive thrombus, toxic metabolic encephalopathy, severe sepsis secondary to serratia PNA, stage Peg NHL (s/p chemo/[place] and [place] [facility] [place]), 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 Patient was discharged to [facility] after prolonged hospital course. Upon arrival to [facility] pt was placed on volume control ventilation at which time she desaturated, became unresponsive and in acute respiratory distress. Upon arrival to [facility] ED, patient was placed on pressure control with improvement in symptoms. ABG in ER 7.42/69/152/41. Patient was admitted to medicine for further care. CT chest revealed small right PTX, CT surgery consulted, no intervention planned. She was initially treated for UTI, culture negative, afebrile, leukocytosis attributed to steroids, and antibiotics were d/c. Patient has remained stable and discharge planning was initiated.      Events last 24 hours: Less responsive today than baseline, prompting ABG overnight. Acute on chronic hypercapnea with mild respiratory acidosis. Tele medicine consulted, d/w eICU Attending Dr. Delong, vent augmented and FiO2 lowered. Patient was also given 1L IVF bolus.   Repeat ABG with compensated respiratory acidosis at baseline. Placed on EtCO2 monitoring (~20 mmHg lower than ABG correlate). Mental status improved with normalization of CO2. paO2 low, FiO2 titrated to 35%, SpO2 improved to 96%, and was weaned back to 30%.       PAST MEDICAL & SURGICAL HISTORY:  Interstitial lung disease: on home o2 prn  NHL (non-Hodgkin's lymphoma): Agem 43 sp chemo/rt/stem cell  Transient cerebral ischemia, unspecified type  Mitral prolapse  History of tonsillectomy  History of appendectomy        SOCIAL HISTORY: UATO due to AMS      Review of Systems: Due to altered mental status/trach, subjective information was not able to be obtained from the patient. History was obtained, to the extent possible, from review of the chart and collateral sources of information.      Medications:  atovaquone Suspension 1500 milliGRAM(s) Oral daily  propranolol 20 milliGRAM(s) Oral three times a day  ALBUTerol   0.042% 1.25 milliGRAM(s) Nebulizer four times a day PRN  acetaminophen    Suspension .. 650 milliGRAM(s) Oral every 6 hours PRN  fentaNYL   Patch  12 MICROgram(s)/Hr 1 Patch Transdermal every 72 hours  melatonin 5 milliGRAM(s) Oral at bedtime PRN  mirtazapine 15 milliGRAM(s) Oral at bedtime  OXcarbazepine 600 milliGRAM(s) Oral two times a day  QUEtiapine 25 milliGRAM(s) Oral at bedtime  enoxaparin Injectable 40 milliGRAM(s) SubCutaneous daily  aluminum hydroxide/magnesium hydroxide/simethicone Suspension 30 milliLiter(s) Oral every 4 hours PRN  famotidine    Tablet 20 milliGRAM(s) Oral daily  polyethylene glycol 3350 17 Gram(s) Oral at bedtime  senna Syrup 10 milliLiter(s) Oral at bedtime  simethicone drops 40 milliGRAM(s) Oral every 6 hours PRN  sucralfate suspension 1 Gram(s) Oral every 6 hours  predniSONE   Tablet 20 milliGRAM(s) Oral daily  ascorbic acid 500 milliGRAM(s) Oral daily  ferrous    sulfate 325 milliGRAM(s) Oral two times a day  multivitamin 1 Tablet(s) Oral daily  chlorhexidine 0.12% Liquid 15 milliLiter(s) Oral Mucosa every 12 hours  collagenase Ointment 1 Application(s) Topical daily        Mode: AC/ CMV (Assist Control/ Continuous Mandatory Ventilation)  RR (machine): 28  FiO2: 35  PEEP: 5  ITime: 1  MAP: 13  PC: 35  PIP: 38      ICU Vital Signs Last 24 Hrs  T(C): 37.3 (15 Mar 2020 04:00), Max: 37.3 (15 Mar 2020 04:00)  T(F): 99.1 (15 Mar 2020 04:00), Max: 99.1 (15 Mar 2020 04:00)  HR: 92 (15 Mar 2020 04:00) (73 - 100)  BP: 108/55 (15 Mar 2020 04:00) (90/50 - 123/62)  BP(mean): 67 (15 Mar 2020 04:00) (64 - 87)  ABP: --  ABP(mean): --  RR: 28 (15 Mar 2020 04:00) (25 - 33)  SpO2: 100% (15 Mar 2020 04:00) (90% - 100%)      ABG - ( 15 Mar 2020 01:24 )  pH, Arterial: 7.36  pH, Blood: x     /  pCO2: 76    /  pO2: 49    / HCO3: 39    / Base Excess: 15.3  /  SaO2: 82                  I&O's Detail    13 Mar 2020 07:01  -  14 Mar 2020 07:00  --------------------------------------------------------  IN:    Enteral Tube Flush: 600 mL    Nepro: 1080 mL  Total IN: 1680 mL    OUT:    Indwelling Catheter - Urethral: 905 mL  Total OUT: 905 mL    Total NET: 775 mL      14 Mar 2020 07:01  -  15 Mar 2020 05:57  --------------------------------------------------------  IN:    Enteral Tube Flush: 340 mL    Jevity: 720 mL    Nepro: 450 mL    Sodium Chloride 0.9% IV Bolus: 1000 mL  Total IN: 2510 mL    OUT:    Indwelling Catheter - Urethral: 850 mL  Total OUT: 850 mL    Total NET: 1660 mL            LABS:                        7.3    21.10 )-----------( 741      ( 15 Mar 2020 05:17 )             25.2     03-14    130<L>  |  84<L>  |  21.0<H>  ----------------------------<  127<H>  4.3   |  37.0<H>  |  0.25<L>    Ca    9.5      14 Mar 2020 04:27  Mg     2.1     03-14    TPro  6.0<L>  /  Alb  2.9<L>  /  TBili  <0.2<L>  /  DBili  x   /  AST  26  /  ALT  33<H>  /  AlkPhos  179<H>  03-14          CAPILLARY BLOOD GLUCOSE      POCT Blood Glucose.: 148 mg/dL (14 Mar 2020 21:36)        CULTURES:  Culture Results:   Normal Respiratory Meme present (03-12 @ 16:28)  Culture Results:   <10,000 CFU/mL Normal Urogenital Meme (03-11 @ 08:58)  Culture Results:   No growth at 48 hours (03-10 @ 23:16)  Culture Results:   No growth at 48 hours (03-10 @ 23:16)  Rapid RVP Result: NotDetec (03-10 @ 23:13)        Physical Examination:    General: No acute distress.      HEENT: Pupils equal, reactive to light.  Symmetric. Tracheostomy     PULM: Bilateral ronchi diffusely, no respiratory distress or accessory muscle use    CVS: Regular rate and rhythm, no murmurs, rubs, or gallops    ABD: Soft, +PEG    SKIN: Warm and well perfused, no rashes noted.    NEURO: Easily arousable to verbal stimuli, moves all extremities nonfocal, tracks with eyes, more interactive         RADIOLOGY: CXR with bilateral patchy infiltrates slightly increased from prior    INVASIVE LINES:  INDWELLING LUKE:  VTE PROPHYLAXIS:  CAM ICU:  CODE STATUS:    CRITICAL CARE TIME SPENT: *** minutes spent performing frequent bedside reassessments and augmenting plan of care to address problems of acute critical illness that pose high probability of life threatening deterioration and/or end organ damage/dysfunction and discussing goals of care with patient/family, non-inclusive of time spent on procedures performed. Patient is a 60y old  Female who presents with a chief complaint of Acute respiratory distress (14 Mar 2020 09:34)      BRIEF HOSPITAL COURSE: 58 yo female with PMHx unspecified pleural/parenchymal bronchiectatic process with pulmonary nodules, severe restrictive interstitial lung disease with chronic respiratory failure with spontaneous right-sided PTX (1/19) s/p trach 1/29, ventilator dependent, complicated by PEA cardiac arrest (1/29), RLE DVT and RUE occlusive thrombus, toxic metabolic encephalopathy, severe sepsis secondary to serratia PNA, stage Peg NHL (s/p [place] [facility] [place] 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 Patient was discharged to [facility] from [facility] after prolonged hospital course. Upon arrival to [facility] pt was placed on volume control ventilation at which time she desaturated, became unresponsive and in acute respiratory distress. Upon arrival to [facility] ED, patient was placed on pressure control with improvement in symptoms. ABG in ER 7.42/69/152/41. Patient was admitted to medicine for further care. CT chest revealed small right PTX, CT surgery consulted, no intervention planned. She was initially treated for UTI, culture negative, afebrile, leukocytosis attributed to steroids, and antibiotics were d/c. Patient has remained stable and discharge planning was initiated.      Events last 24 hours: Less responsive today than baseline, prompting ABG overnight. Acute on chronic hypercapnea with mild respiratory acidosis. Tele medicine consulted, d/w eICU Attending Dr. Delong. Vent augmented and FiO2 lowered. Patient was also given 1L IVF bolus.   Repeat ABG with compensated respiratory acidosis at baseline. Placed on EtCO2 monitoring (~20 mmHg lower than ABG correlate). Mental status improved with normalization of CO2. paO2 low, FiO2 titrated to 35%, SpO2 improved to 96%, and was weaned back to 30%.       PAST MEDICAL & SURGICAL HISTORY:  Interstitial lung disease: on home o2 prn  NHL (non-Hodgkin's lymphoma): Agem 43 sp chemo/rt/stem cell  Transient cerebral ischemia, unspecified type  Mitral prolapse  History of tonsillectomy  History of appendectomy        SOCIAL HISTORY: UATO due to AMS      Review of Systems: Due to altered mental status/trach, subjective information was not able to be obtained from the patient. History was obtained, to the extent possible, from review of the chart and collateral sources of information.      Medications:  atovaquone Suspension 1500 milliGRAM(s) Oral daily  propranolol 20 milliGRAM(s) Oral three times a day  ALBUTerol   0.042% 1.25 milliGRAM(s) Nebulizer four times a day PRN  acetaminophen    Suspension .. 650 milliGRAM(s) Oral every 6 hours PRN  fentaNYL   Patch  12 MICROgram(s)/Hr 1 Patch Transdermal every 72 hours  melatonin 5 milliGRAM(s) Oral at bedtime PRN  mirtazapine 15 milliGRAM(s) Oral at bedtime  OXcarbazepine 600 milliGRAM(s) Oral two times a day  QUEtiapine 25 milliGRAM(s) Oral at bedtime  enoxaparin Injectable 40 milliGRAM(s) SubCutaneous daily  aluminum hydroxide/magnesium hydroxide/simethicone Suspension 30 milliLiter(s) Oral every 4 hours PRN  famotidine    Tablet 20 milliGRAM(s) Oral daily  polyethylene glycol 3350 17 Gram(s) Oral at bedtime  senna Syrup 10 milliLiter(s) Oral at bedtime  simethicone drops 40 milliGRAM(s) Oral every 6 hours PRN  sucralfate suspension 1 Gram(s) Oral every 6 hours  predniSONE   Tablet 20 milliGRAM(s) Oral daily  ascorbic acid 500 milliGRAM(s) Oral daily  ferrous    sulfate 325 milliGRAM(s) Oral two times a day  multivitamin 1 Tablet(s) Oral daily  chlorhexidine 0.12% Liquid 15 milliLiter(s) Oral Mucosa every 12 hours  collagenase Ointment 1 Application(s) Topical daily        Mode: AC/ CMV (Assist Control/ Continuous Mandatory Ventilation)  RR (machine): 28  FiO2: 35  PEEP: 5  ITime: 1  MAP: 13  PC: 35  PIP: 38      ICU Vital Signs Last 24 Hrs  T(C): 37.3 (15 Mar 2020 04:00), Max: 37.3 (15 Mar 2020 04:00)  T(F): 99.1 (15 Mar 2020 04:00), Max: 99.1 (15 Mar 2020 04:00)  HR: 92 (15 Mar 2020 04:00) (73 - 100)  BP: 108/55 (15 Mar 2020 04:00) (90/50 - 123/62)  BP(mean): 67 (15 Mar 2020 04:00) (64 - 87)  ABP: --  ABP(mean): --  RR: 28 (15 Mar 2020 04:00) (25 - 33)  SpO2: 100% (15 Mar 2020 04:00) (90% - 100%)      ABG - ( 15 Mar 2020 01:24 )  pH, Arterial: 7.36  pH, Blood: x     /  pCO2: 76    /  pO2: 49    / HCO3: 39    / Base Excess: 15.3  /  SaO2: 82                  I&O's Detail    13 Mar 2020 07:01  -  14 Mar 2020 07:00  --------------------------------------------------------  IN:    Enteral Tube Flush: 600 mL    Nepro: 1080 mL  Total IN: 1680 mL    OUT:    Indwelling Catheter - Urethral: 905 mL  Total OUT: 905 mL    Total NET: 775 mL      14 Mar 2020 07:01  -  15 Mar 2020 05:57  --------------------------------------------------------  IN:    Enteral Tube Flush: 340 mL    Jevity: 720 mL    Nepro: 450 mL    Sodium Chloride 0.9% IV Bolus: 1000 mL  Total IN: 2510 mL    OUT:    Indwelling Catheter - Urethral: 850 mL  Total OUT: 850 mL    Total NET: 1660 mL            LABS:                        7.3    21.10 )-----------( 741      ( 15 Mar 2020 05:17 )             25.2     03-14    130<L>  |  84<L>  |  21.0<H>  ----------------------------<  127<H>  4.3   |  37.0<H>  |  0.25<L>    Ca    9.5      14 Mar 2020 04:27  Mg     2.1     03-14    TPro  6.0<L>  /  Alb  2.9<L>  /  TBili  <0.2<L>  /  DBili  x   /  AST  26  /  ALT  33<H>  /  AlkPhos  179<H>  03-14          CAPILLARY BLOOD GLUCOSE      POCT Blood Glucose.: 148 mg/dL (14 Mar 2020 21:36)        CULTURES:  Culture Results:   Normal Respiratory Meme present (03-12 @ 16:28)  Culture Results:   <10,000 CFU/mL Normal Urogenital Meme (03-11 @ 08:58)  Culture Results:   No growth at 48 hours (03-10 @ 23:16)  Culture Results:   No growth at 48 hours (03-10 @ 23:16)  Rapid RVP Result: NotDetec (03-10 @ 23:13)        Physical Examination:    General: No acute distress.      HEENT: Pupils equal, reactive to light.  Symmetric. Tracheostomy     PULM: Bilateral ronchi diffusely, no respiratory distress or accessory muscle use    CVS: Regular rate and rhythm, no murmurs, rubs, or gallops    ABD: Soft, +PEG    SKIN: Warm and well perfused, no rashes noted.    NEURO: Easily arousable to verbal stimuli, moves all extremities nonfocal, tracks with eyes, more interactive         RADIOLOGY: CXR with bilateral patchy infiltrates slightly increased from prior        CRITICAL CARE TIME SPENT: 30 minutes spent performing frequent bedside reassessments and augmenting plan of care to address problems of acute critical illness that pose high probability of life threatening deterioration and/or end organ damage/dysfunction and discussing goals of care with patient/family, non-inclusive of time spent on procedures performed.

## 2020-03-15 NOTE — CHART NOTE - NSCHARTNOTEFT_GEN_A_CORE
Source: Patient [ ]  Family [ ]   other [x ] EMR and staff     Enteral /Parenteral Nutrition: Diet, NPO with Tube Feed:   Tube Feeding Modality: Gastrostomy  Jevity 1.5 Wallace  Total Volume for 24 Hours (mL): 1440  Continuous  Until Goal Tube Feed Rate (mL per Hour): 60  Tube Feed Duration (in Hours): 24  Tube Feed Start Time: 09:30  Supplement Feeding Modality:  Gastrostomy  Prostat Cans or Servings Per Day:  1       Frequency:  Daily (03-12-20 @ 09:27)      Current Weight:   3/12: 61kg   3/11: 51kg     % Weight Change: 22# wt change in one day, unsure of accuracy of wt's will continue to monitor trends     Pertinent Medications: MEDICATIONS  (STANDING):  ascorbic acid 500 milliGRAM(s) Oral daily  atovaquone Suspension 1500 milliGRAM(s) Oral daily  chlorhexidine 0.12% Liquid 15 milliLiter(s) Oral Mucosa every 12 hours  collagenase Ointment 1 Application(s) Topical daily  enoxaparin Injectable 40 milliGRAM(s) SubCutaneous daily  famotidine    Tablet 20 milliGRAM(s) Oral daily  fentaNYL   Patch  12 MICROgram(s)/Hr 1 Patch Transdermal every 72 hours  ferrous    sulfate 325 milliGRAM(s) Oral two times a day  mirtazapine 15 milliGRAM(s) Oral at bedtime  multivitamin 1 Tablet(s) Oral daily  OXcarbazepine 600 milliGRAM(s) Oral two times a day  polyethylene glycol 3350 17 Gram(s) Oral at bedtime  predniSONE   Tablet 20 milliGRAM(s) Oral daily  propranolol 20 milliGRAM(s) Oral three times a day  QUEtiapine 25 milliGRAM(s) Oral at bedtime  senna Syrup 10 milliLiter(s) Oral at bedtime  sucralfate suspension 1 Gram(s) Oral every 6 hours    MEDICATIONS  (PRN):  acetaminophen    Suspension .. 650 milliGRAM(s) Oral every 6 hours PRN Temp greater or equal to 38C (100.4F), Mild Pain (1 - 3)  ALBUTerol   0.042% 1.25 milliGRAM(s) Nebulizer four times a day PRN Shortness of Breath and/or Wheezing  aluminum hydroxide/magnesium hydroxide/simethicone Suspension 30 milliLiter(s) Oral every 4 hours PRN Dyspepsia  melatonin 5 milliGRAM(s) Oral at bedtime PRN Insomnia  simethicone drops 40 milliGRAM(s) Oral every 6 hours PRN Gas    Pertinent Labs: CBC Full  -  ( 15 Mar 2020 05:17 )  WBC Count : 21.10 K/uL  RBC Count : 2.78 M/uL  Hemoglobin : 7.3 g/dL  Hematocrit : 25.2 %  Platelet Count - Automated : 741 K/uL  Mean Cell Volume : 90.6 fl  Mean Cell Hemoglobin : 26.3 pg  Mean Cell Hemoglobin Concentration : 29.0 gm/dL  Auto Neutrophil # : x  Auto Lymphocyte # : x  Auto Monocyte # : x  Auto Eosinophil # : x  Auto Basophil # : x  Auto Neutrophil % : x  Auto Lymphocyte % : x  Auto Monocyte % : x  Auto Eosinophil % : x  Auto Basophil % : x        Skin: unstageable to coccyx noted.     Nutrition focused physical exam conducted - found signs of malnutrition [ ]absent [ ]present    Subcutaneous fat loss: Severe [x ] Orbital fat pads region, [ x]Buccal fat region, [ ]Triceps region,  [ ]Ribs region    Muscle wasting: Severe [x ]Temples region, [x ]Clavicle region, [x ]Shoulder region, [ ]Scapula region, [ ]Interosseous region,  [ ]thigh region, [ ]Calf region    Estimated Needs:   [ ] no change since previous assessment  [ ] recalculated:     Current Nutrition Diagnosis:  Pt remains at high nutrition risk secondary to severe-chronic protein calorie malnutrition related to inability to meet sufficient protein-energy in setting of interstitial lung disease, chronic respiratory failure requiring trach, and skin breakdown as evidenced by severe muscle mass loss in temple, clavicle and shoulder regions and severe fat loss in orbital and buccal region, 8.1% wt loss x ~3 months. Pt currently receiving enteral feeds of Jevity 1.5, goal rate of 60 ml/hr (x20 hrs) provides 1200 ml/day; 1800 kcal, 77g protein, 917 ml free water, and >100% of RDIs for vitamins/minerals.+ prostat 30 ml once daily (tube feeds + supplement will provide 1900 kcal and 92g protein daily). Currently meeting estimated nutrition needs at this time. Recommendations below:       Recommendations:   1. RX: MVI daily   2. Continue with Vit. C and ferrous sulfate   3. Check wt daily to monitor trends  4. Monitor Electrolytes.     Monitoring and Evaluation:   [ ] PO intake [x ] Tolerance to diet prescription [X] Weights  [X] Follow up per protocol [X] Labs:

## 2020-03-15 NOTE — CONSULT NOTE ADULT - SUBJECTIVE AND OBJECTIVE BOX
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 Patient was discharged to [facility], on the day of arrival to [facility], from [facility]. Upon arrival to [facility] pt was placed on volume control ventilation at which time she immediately desaturated, became unresponsive and in acute respiratory distress.  history obtained from patient daughter at bedside and patient chart. Family is currently requesting that patient not return to [facility]. Serum Na was 130 yest today 134 improved w IVF NS. ROS UTO      PAST MEDICAL & SURGICAL HISTORY:  Interstitial lung disease: on home o2 prn  NHL (non-Hodgkin's lymphoma): Agem 45 sp chemo/rt/stem cell  Transient cerebral ischemia, unspecified type  Mitral prolapse  History of tonsillectomy  History of appendectomy      FAMILY HISTORY:  Family history of stroke  Family history of breast cancer: Mother  NC    Social History:Non smoker    MEDICATIONS  (STANDING):  ascorbic acid 500 milliGRAM(s) Oral daily  atovaquone Suspension 1500 milliGRAM(s) Oral daily  chlorhexidine 0.12% Liquid 15 milliLiter(s) Oral Mucosa every 12 hours  collagenase Ointment 1 Application(s) Topical daily  enoxaparin Injectable 40 milliGRAM(s) SubCutaneous daily  famotidine    Tablet 20 milliGRAM(s) Oral daily  fentaNYL   Patch  12 MICROgram(s)/Hr 1 Patch Transdermal every 72 hours  ferrous    sulfate 325 milliGRAM(s) Oral two times a day  mirtazapine 15 milliGRAM(s) Oral at bedtime  multivitamin 1 Tablet(s) Oral daily  OXcarbazepine 600 milliGRAM(s) Oral two times a day  polyethylene glycol 3350 17 Gram(s) Oral at bedtime  predniSONE   Tablet 20 milliGRAM(s) Oral daily  propranolol 20 milliGRAM(s) Oral three times a day  QUEtiapine 25 milliGRAM(s) Oral at bedtime  senna Syrup 10 milliLiter(s) Oral at bedtime  sucralfate suspension 1 Gram(s) Oral every 6 hours    MEDICATIONS  (PRN):  acetaminophen    Suspension .. 650 milliGRAM(s) Oral every 6 hours PRN Temp greater or equal to 38C (100.4F), Mild Pain (1 - 3)  ALBUTerol   0.042% 1.25 milliGRAM(s) Nebulizer four times a day PRN Shortness of Breath and/or Wheezing  aluminum hydroxide/magnesium hydroxide/simethicone Suspension 30 milliLiter(s) Oral every 4 hours PRN Dyspepsia  melatonin 5 milliGRAM(s) Oral at bedtime PRN Insomnia  simethicone drops 40 milliGRAM(s) Oral every 6 hours PRN Gas   Meds reviewed    Allergies    IV Contrast (Anaphylaxis)  shellfish. (Anaphylaxis)    Intolerances    lorazepam (Other (Severe))        Vital Signs Last 24 Hrs  T(C): 36.9 (15 Mar 2020 08:00), Max: 37.3 (15 Mar 2020 04:00)  T(F): 98.4 (15 Mar 2020 08:00), Max: 99.1 (15 Mar 2020 04:00)  HR: 100 (15 Mar 2020 08:00) (75 - 100)  BP: 96/52 (15 Mar 2020 08:00) (90/50 - 119/57)  BP(mean): 71 (15 Mar 2020 08:00) (64 - 82)  RR: 32 (15 Mar 2020 08:00) (25 - 34)  SpO2: 92% (15 Mar 2020 08:00) (88% - 100%)  Daily     Daily     PHYSICAL EXAM:    GENERAL: appears chronically ill  HEAD:  Trach  EYES: EOMI  NECK: Supple, neck  veins full  NERVOUS SYSTEM:  [place]  CHEST/LUNG: dec BS B/L  HEART: Regular rate and rhythm  ABDOMEN: Soft, Nontender, Nondistended; Bowel sounds present  EXTREMITIES:  No edema      LABS:                        7.3    21.10 )-----------( 741      ( 15 Mar 2020 05:17 )             25.2     03-15    134<L>  |  88<L>  |  35.0<H>  ----------------------------<  117<H>  4.7   |  37.0<H>  |  0.26<L>    Ca    9.2      15 Mar 2020 05:17  Mg     2.1     03-14    TPro  6.0<L>  /  Alb  2.9<L>  /  TBili  <0.2<L>  /  DBili  x   /  AST  26  /  ALT  33<H>  /  AlkPhos  179<H>  03-14          ABG - ( 15 Mar 2020 01:24 )  pH, Arterial: 7.36  pH, Blood: x     /  pCO2: 76    /  pO2: 49    / HCO3: 39    / Base Excess: 15.3  /  SaO2: 82                    RADIOLOGY & ADDITIONAL TESTS:

## 2020-03-15 NOTE — PROGRESS NOTE ADULT - ASSESSMENT
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 Patient was discharged to [facility], on the day of arrival to [facility], from [facility]. Upon arrival to [facility] pt was placed on volume control ventilation at which time she desaturated, became unresponsive and in acute respiratory distress.  Admitted for evaluation, seen by CT Surgery, Pulmonary and critical care.    #Acute on chronic respiratory failure; Small R Pneumothorax,stable; Severe ILD :  Remains on pressure control ventilation.  She apparently could not tolerate volume control at Vent facility.  Continue Vent support.  Repeat ABG shows compensated respiratory acidosis.  Discussed with Dr. Duncan.  Cont prednisone 20mg for ILD with Atovaquone for PCP prophylaxis.    #Metabolic encephalopathy - likely multifactorial.  D/w daughter Eva, states this is typcial whenever pt hypercarbic.  Stopping duragesic.  Seroquel, Remeron d/c'd, although has not received.    #Acute on Chronic blood loss anemia - Stool OB was negative.  Monitor H/H, appears to be stable.  #Right Brachial Vein Thrombosis & Left Femoral DVT, present on arrival; due to line in that arm previously; since removed.  Repeat U/S showed no DVT in UE/ LEs.  Currently off anticoagulation due to bleed.  Family to attempt to obtain information of hospital course at [facility].  # Chronic Pain - Controlled on Fentanyl.   #Severe protein calorie Malnutrition - - cont TF: Nepro @45ml/hr for 24hrs.  #H/o Constipation, cont bowel regimen  #h/o Sacrum/coccyx pressure ulcer, present on admission - cont daily wound care, cont Santyl  # DVT Prophylaxis - Lovenox subcut    / consult - for discharge arrangement for a NH than can ventilate patient with use of pressure control ventilation.  Plan of care d/w daughter Eva at [phone] Initial HPI:  This is a 58 y/o female with history of severe restrictive interstitial lung disease, s/p resp failure s/p trach 1/29, ventilator dependent, s/p PEA arrest (1/29), RLE DVT and RUE occlusive thrombus, toxic metabolic encephalopathy, severe sepsis secondary to serratia PNA, unspecified pleural/parenchymal bronchiectatic process with pulmonary nodules for which she has opposed steroid therapy (prior), spontaneous right-sided PTX (1/19), stage Peg NHL (s/p chemo/XRT and SCT received '03 at [facility] [place]) 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 Patient was discharged to [facility], on the day of arrival to [facility], from [facility]. Upon arrival to [facility] pt was placed on volume control ventilation at which time she desaturated, became unresponsive and in acute respiratory distress.  Admitted for evaluation, seen by CT Surgery, Pulmonary and critical care.    #Acute on chronic respiratory failure; Small R Pneumothorax,stable; Severe ILD :  Remains on pressure control ventilation.  She apparently could not tolerate volume control at Vent facility.  Continue Vent support.  Repeat ABG shows compensated respiratory acidosis.  Discussed with Dr. Duncan.  Cont prednisone 20mg for ILD with Atovaquone for PCP prophylaxis.    #Metabolic encephalopathy - likely multifactorial.  D/w daughter Eva, states this is typcial whenever pt hypercarbic.  Stopping duragesic.  Seroquel, Remeron d/c'd, although has not received.    #Acute on Chronic blood loss anemia - Stool OB was negative.  Monitor H/H, appears to be stable.  #Right Brachial Vein Thrombosis & Left Femoral DVT, present on arrival; due to line in that arm previously; since removed.  Repeat U/S showed no DVT in UE/ LEs.  Currently off anticoagulation due to bleed.  I discussed the role of anticoagulation vs IVC Filter vs conservative management in consideration of above, with pt, , daughter and mother at bedside.  They acknowledged understanding the risks, benefits and alternatives and agreed with conservative management and observation.    # Chronic Pain - Start Oxycodone  #Severe protein calorie Malnutrition - - cont TF: Nepro @45ml/hr for 24hrs.  #H/o Constipation, cont bowel regimen  #h/o Sacrum/coccyx pressure ulcer, present on admission - cont daily wound care, cont Santyl  # DVT Prophylaxis - Lovenox subcut    / consult - for discharge arrangement for a NH than can ventilate patient with use of pressure control ventilation.  Plan of care d/w daughter Eva at [phone] and again in person with daughter,  and mother at bedside.

## 2020-03-15 NOTE — PROGRESS NOTE ADULT - SUBJECTIVE AND OBJECTIVE BOX
Patient: ROMIE VIRAMONTES [id number] 60y Female                           Internal Medicine Hospitalist Progress Note    Seen in MICU with RN.  On vent support.  Overnight noted to be more lethargic.  pCO2 on ABG 90s and evaluated by EICU.  Settings were changed.      ____________________PHYSICAL EXAM:  GENERAL:  NAD, Awake.  Does not maintain eye contact.    HEENT: NCAT  CARDIOVASCULAR:  S1, S2  LUNGS: CTAB  ABDOMEN:  soft, (-) tenderness, (-) distension, (+) bowel sounds, (-) guarding, (-) rebound (-) rigidity  EXTREMITIES:  no cyanosis / clubbing / edema.   NEURO: able to spontaneously move RUE / RLE.   SKIN: Sacral Unstageable ~ 5x4cm ulcer with slough  ____________________    VITALS:  Vital Signs Last 24 Hrs  T(C): 36.9 (15 Mar 2020 08:00), Max: 37.3 (15 Mar 2020 04:00)  T(F): 98.4 (15 Mar 2020 08:00), Max: 99.1 (15 Mar 2020 04:00)  HR: 103 (15 Mar 2020 08:48) (75 - 103)  BP: 96/52 (15 Mar 2020 08:00) (90/50 - 119/57)  BP(mean): 71 (15 Mar 2020 08:00) (64 - 82)  RR: 32 (15 Mar 2020 08:00) (25 - 34)  SpO2: 94% (15 Mar 2020 08:48) (88% - 100%) Daily     Daily   CAPILLARY BLOOD GLUCOSE      POCT Blood Glucose.: 148 mg/dL (14 Mar 2020 21:36)    I&O's Summary    14 Mar 2020 07:01  -  15 Mar 2020 07:00  --------------------------------------------------------  IN: 2730 mL / OUT: 970 mL / NET: 1760 mL    15 Mar 2020 07:01  -  15 Mar 2020 09:59  --------------------------------------------------------  IN: 120 mL / OUT: 60 mL / NET: 60 mL        LABS:                        7.3    21.10 )-----------( 741      ( 15 Mar 2020 05:17 )             25.2     03-15    134<L>  |  88<L>  |  35.0<H>  ----------------------------<  117<H>  4.7   |  37.0<H>  |  0.26<L>    Ca    9.2      15 Mar 2020 05:17  Mg     2.1     03-14    TPro  6.0<L>  /  Alb  2.9<L>  /  TBili  <0.2<L>  /  DBili  x   /  AST  26  /  ALT  33<H>  /  AlkPhos  179<H>  03-14      LIVER FUNCTIONS - ( 14 Mar 2020 04:27 )  Alb: 2.9 g/dL / Pro: 6.0 g/dL / ALK PHOS: 179 U/L / ALT: 33 U/L / AST: 26 U/L / GGT: x                   Culture - Sputum (collected 12 Mar 2020 16:28)  Source: .Sputum  Gram Stain (12 Mar 2020 23:22):    Numerous polymorphonuclear leukocytes per low power field    No Squamous epithelial cells per low power field    No organisms seen per oil power field  Final Report (14 Mar 2020 19:11):    Normal Respiratory Meme present        MEDICATIONS:  acetaminophen    Suspension .. 650 milliGRAM(s) Oral every 6 hours PRN  ALBUTerol   0.042% 1.25 milliGRAM(s) Nebulizer four times a day PRN  aluminum hydroxide/magnesium hydroxide/simethicone Suspension 30 milliLiter(s) Oral every 4 hours PRN  ascorbic acid 500 milliGRAM(s) Oral daily  atovaquone Suspension 1500 milliGRAM(s) Oral daily  chlorhexidine 0.12% Liquid 15 milliLiter(s) Oral Mucosa every 12 hours  collagenase Ointment 1 Application(s) Topical daily  enoxaparin Injectable 40 milliGRAM(s) SubCutaneous daily  famotidine    Tablet 20 milliGRAM(s) Oral daily  ferrous    sulfate 325 milliGRAM(s) Oral two times a day  melatonin 5 milliGRAM(s) Oral at bedtime PRN  multivitamin 1 Tablet(s) Oral daily  OXcarbazepine 600 milliGRAM(s) Oral two times a day  polyethylene glycol 3350 17 Gram(s) Oral at bedtime  predniSONE   Tablet 20 milliGRAM(s) Oral daily  propranolol 20 milliGRAM(s) Oral three times a day  senna Syrup 10 milliLiter(s) Oral at bedtime  simethicone drops 40 milliGRAM(s) Oral every 6 hours PRN  sucralfate suspension 1 Gram(s) Oral every 6 hours

## 2020-03-16 LAB
ANION GAP SERPL CALC-SCNC: 11 MMOL/L — SIGNIFICANT CHANGE UP (ref 5–17)
BUN SERPL-MCNC: 28 MG/DL — HIGH (ref 8–20)
CALCIUM SERPL-MCNC: 9.1 MG/DL — SIGNIFICANT CHANGE UP (ref 8.6–10.2)
CHLORIDE SERPL-SCNC: 86 MMOL/L — LOW (ref 98–107)
CO2 SERPL-SCNC: 38 MMOL/L — HIGH (ref 22–29)
CREAT SERPL-MCNC: 0.23 MG/DL — LOW (ref 0.5–1.3)
CULTURE RESULTS: SIGNIFICANT CHANGE UP
CULTURE RESULTS: SIGNIFICANT CHANGE UP
FUNGITELL: 34 PG/ML — SIGNIFICANT CHANGE UP
GLUCOSE SERPL-MCNC: 163 MG/DL — HIGH (ref 70–99)
HCT VFR BLD CALC: 25.8 % — LOW (ref 34.5–45)
HGB BLD-MCNC: 7.7 G/DL — LOW (ref 11.5–15.5)
MCHC RBC-ENTMCNC: 26.8 PG — LOW (ref 27–34)
MCHC RBC-ENTMCNC: 29.8 GM/DL — LOW (ref 32–36)
MCV RBC AUTO: 89.9 FL — SIGNIFICANT CHANGE UP (ref 80–100)
PLATELET # BLD AUTO: 726 K/UL — HIGH (ref 150–400)
POTASSIUM SERPL-MCNC: 4.7 MMOL/L — SIGNIFICANT CHANGE UP (ref 3.5–5.3)
POTASSIUM SERPL-SCNC: 4.7 MMOL/L — SIGNIFICANT CHANGE UP (ref 3.5–5.3)
RBC # BLD: 2.87 M/UL — LOW (ref 3.8–5.2)
RBC # FLD: 17 % — HIGH (ref 10.3–14.5)
SODIUM SERPL-SCNC: 135 MMOL/L — SIGNIFICANT CHANGE UP (ref 135–145)
SPECIMEN SOURCE: SIGNIFICANT CHANGE UP
SPECIMEN SOURCE: SIGNIFICANT CHANGE UP
URATE SERPL-MCNC: 0.9 MG/DL — LOW (ref 2.4–5.7)
WBC # BLD: 18.09 K/UL — HIGH (ref 3.8–10.5)
WBC # FLD AUTO: 18.09 K/UL — HIGH (ref 3.8–10.5)

## 2020-03-16 PROCEDURE — 99233 SBSQ HOSP IP/OBS HIGH 50: CPT

## 2020-03-16 RX ORDER — FENTANYL CITRATE 50 UG/ML
1 INJECTION INTRAVENOUS
Refills: 0 | Status: DISCONTINUED | OUTPATIENT
Start: 2020-03-16 | End: 2020-03-18

## 2020-03-16 RX ORDER — MIRTAZAPINE 45 MG/1
15 TABLET, ORALLY DISINTEGRATING ORAL DAILY
Refills: 0 | Status: DISCONTINUED | OUTPATIENT
Start: 2020-03-16 | End: 2020-03-17

## 2020-03-16 RX ADMIN — POLYETHYLENE GLYCOL 3350 17 GRAM(S): 17 POWDER, FOR SOLUTION ORAL at 22:24

## 2020-03-16 RX ADMIN — Medication 325 MILLIGRAM(S): at 05:16

## 2020-03-16 RX ADMIN — FENTANYL CITRATE 1 PATCH: 50 INJECTION INTRAVENOUS at 20:00

## 2020-03-16 RX ADMIN — CHLORHEXIDINE GLUCONATE 15 MILLILITER(S): 213 SOLUTION TOPICAL at 05:02

## 2020-03-16 RX ADMIN — Medication 20 MILLIGRAM(S): at 05:05

## 2020-03-16 RX ADMIN — ATOVAQUONE 1500 MILLIGRAM(S): 750 SUSPENSION ORAL at 11:02

## 2020-03-16 RX ADMIN — ENOXAPARIN SODIUM 40 MILLIGRAM(S): 100 INJECTION SUBCUTANEOUS at 11:02

## 2020-03-16 RX ADMIN — Medication 1 GRAM(S): at 17:39

## 2020-03-16 RX ADMIN — FAMOTIDINE 20 MILLIGRAM(S): 10 INJECTION INTRAVENOUS at 11:01

## 2020-03-16 RX ADMIN — OXCARBAZEPINE 600 MILLIGRAM(S): 300 TABLET, FILM COATED ORAL at 05:03

## 2020-03-16 RX ADMIN — OXCARBAZEPINE 600 MILLIGRAM(S): 300 TABLET, FILM COATED ORAL at 17:39

## 2020-03-16 RX ADMIN — Medication 1 APPLICATION(S): at 11:02

## 2020-03-16 RX ADMIN — OXYCODONE HYDROCHLORIDE 5 MILLIGRAM(S): 5 TABLET ORAL at 09:57

## 2020-03-16 RX ADMIN — Medication 1 TABLET(S): at 11:01

## 2020-03-16 RX ADMIN — Medication 325 MILLIGRAM(S): at 17:39

## 2020-03-16 RX ADMIN — SENNA PLUS 10 MILLILITER(S): 8.6 TABLET ORAL at 22:24

## 2020-03-16 RX ADMIN — Medication 500 MILLIGRAM(S): at 11:01

## 2020-03-16 RX ADMIN — Medication 1 GRAM(S): at 05:04

## 2020-03-16 RX ADMIN — Medication 650 MILLIGRAM(S): at 16:30

## 2020-03-16 RX ADMIN — MIRTAZAPINE 15 MILLIGRAM(S): 45 TABLET, ORALLY DISINTEGRATING ORAL at 17:39

## 2020-03-16 RX ADMIN — Medication 1 GRAM(S): at 11:02

## 2020-03-16 RX ADMIN — Medication 1 GRAM(S): at 23:45

## 2020-03-16 RX ADMIN — Medication 650 MILLIGRAM(S): at 16:00

## 2020-03-16 RX ADMIN — CHLORHEXIDINE GLUCONATE 15 MILLILITER(S): 213 SOLUTION TOPICAL at 17:39

## 2020-03-16 RX ADMIN — FENTANYL CITRATE 1 PATCH: 50 INJECTION INTRAVENOUS at 11:02

## 2020-03-16 RX ADMIN — OXYCODONE HYDROCHLORIDE 10 MILLIGRAM(S): 5 TABLET ORAL at 04:41

## 2020-03-16 RX ADMIN — OXYCODONE HYDROCHLORIDE 5 MILLIGRAM(S): 5 TABLET ORAL at 10:27

## 2020-03-16 RX ADMIN — OXYCODONE HYDROCHLORIDE 10 MILLIGRAM(S): 5 TABLET ORAL at 05:41

## 2020-03-16 NOTE — PROGRESS NOTE ADULT - SUBJECTIVE AND OBJECTIVE BOX
Patient: ROMIE VIRAMONTES [id number] 60y Female                           Internal Medicine Hospitalist Progress Note    Seen in MICU with RN.  On vent support.  Mental status remains improved.  Appears to be anxious per RN.    ____________________PHYSICAL EXAM:  GENERAL:  NAD, Alert, cooperative.  Can maintain eye contact.   HEENT: NCAT  CARDIOVASCULAR:  S1, S2  LUNGS: CTAB  ABDOMEN:  soft, (-) tenderness, (-) distension, (+) bowel sounds, (-) guarding, (-) rebound (-) rigidity  EXTREMITIES:  no cyanosis / clubbing / edema.   NEURO: able to spontaneously move RUE / RLE.   SKIN: Sacral Unstageable ~ 5x4cm ulcer with slough  ____________________    VITALS:  Vital Signs Last 24 Hrs  T(C): 37 (16 Mar 2020 08:00), Max: 37.1 (15 Mar 2020 15:55)  T(F): 98.6 (16 Mar 2020 08:00), Max: 98.8 (15 Mar 2020 15:55)  HR: 85 (16 Mar 2020 07:00) (84 - 120)  BP: 104/58 (16 Mar 2020 07:00) (96/58 - 122/58)  BP(mean): 76 (16 Mar 2020 07:00) (66 - 85)  RR: 36 (16 Mar 2020 07:00) (31 - 44)  SpO2: 94% (16 Mar 2020 07:00) (89% - 98%) Daily     Daily   CAPILLARY BLOOD GLUCOSE        I&O's Summary    15 Mar 2020 07:01  -  16 Mar 2020 07:00  --------------------------------------------------------  IN: 1720 mL / OUT: 800 mL / NET: 920 mL    16 Mar 2020 07:01  -  16 Mar 2020 08:44  --------------------------------------------------------  IN: 60 mL / OUT: 0 mL / NET: 60 mL        LABS:                        7.7    18.09 )-----------( 726      ( 16 Mar 2020 05:06 )             25.8     03-16    135  |  86<L>  |  28.0<H>  ----------------------------<  163<H>  4.7   |  38.0<H>  |  0.23<L>    Ca    9.1      16 Mar 2020 05:06                    MEDICATIONS:  acetaminophen    Suspension .. 650 milliGRAM(s) Oral every 6 hours PRN  ALBUTerol   0.042% 1.25 milliGRAM(s) Nebulizer four times a day PRN  aluminum hydroxide/magnesium hydroxide/simethicone Suspension 30 milliLiter(s) Oral every 4 hours PRN  ascorbic acid 500 milliGRAM(s) Oral daily  atovaquone Suspension 1500 milliGRAM(s) Oral daily  chlorhexidine 0.12% Liquid 15 milliLiter(s) Oral Mucosa every 12 hours  collagenase Ointment 1 Application(s) Topical daily  enoxaparin Injectable 40 milliGRAM(s) SubCutaneous daily  famotidine    Tablet 20 milliGRAM(s) Oral daily  fentaNYL   Patch  12 MICROgram(s)/Hr 1 Patch Transdermal every 72 hours  ferrous    sulfate 325 milliGRAM(s) Oral two times a day  melatonin 5 milliGRAM(s) Oral at bedtime PRN  mirtazapine 15 milliGRAM(s) Oral daily  multivitamin 1 Tablet(s) Oral daily  OXcarbazepine 600 milliGRAM(s) Oral two times a day  oxyCODONE    IR 5 milliGRAM(s) Oral every 6 hours PRN  oxyCODONE    IR 10 milliGRAM(s) Oral every 6 hours PRN  polyethylene glycol 3350 17 Gram(s) Oral at bedtime  predniSONE   Tablet 20 milliGRAM(s) Oral daily  propranolol 20 milliGRAM(s) Oral every 6 hours  senna Syrup 10 milliLiter(s) Oral at bedtime  simethicone drops 40 milliGRAM(s) Oral every 6 hours PRN  sucralfate suspension 1 Gram(s) Oral every 6 hours

## 2020-03-16 NOTE — PROGRESS NOTE ADULT - ASSESSMENT
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 [facility] day of arrival to [facility], from [facility]. Upon arrival to [facility] pt was placed on volume control ventilation at which time she desaturated, became unresponsive and in acute respiratory distress.  Admitted for evaluation, seen by CT Surgery, Pulmonary and critical care.    #Metabolic encephalopathy - multifactorial.  Now significantly improved with improvement in ventilation and hypercarbia.    #Anxiety - and tachycardic at times.  Will increase Propranolol, restart Remeron.  If HR remains stable, will d/c cardiac monitor.   #Acute on chronic respiratory failure; Small R Pneumothorax,stable; Severe ILD :  Remains on pressure control ventilation.  She apparently could not tolerate volume control at Vent facility.  Continue Vent support.  Cont prednisone 20mg for ILD with Atovaquone for PCP prophylaxis.    #Acute on Chronic blood loss anemia - Stool OB was negative.  Monitor H/H, appears to be stable.  #Right Brachial Vein Thrombosis & Left Femoral DVT, present on arrival; due to line in that arm previously; since removed.  Repeat U/S showed no DVT in UE/ LEs.  Currently off anticoagulation due to bleed.  Given comorbid conditions and extensive conversation with family on 3/15, will observe off anticoagulation.    #Chronic Pain - Pt appears to be more comfortable.  No somnolence.  Restart Duragesic.    #Severe protein calorie Malnutrition - - cont TF: Nepro @45ml/hr for 24hrs.  #H/o Constipation, cont bowel regimen  #h/o Sacrum/coccyx pressure ulcer, present on admission - cont daily wound care, cont Santyl  # DVT Prophylaxis - Lovenox subcut

## 2020-03-16 NOTE — PROGRESS NOTE ADULT - SUBJECTIVE AND OBJECTIVE BOX
NEPHROLOGY INTERVAL HPI/OVERNIGHT EVENTS:    Examined  Chronically ill  Vent dependent  + trach  Dtr at bedside    MEDICATIONS  (STANDING):  ascorbic acid 500 milliGRAM(s) Oral daily  atovaquone Suspension 1500 milliGRAM(s) Oral daily  chlorhexidine 0.12% Liquid 15 milliLiter(s) Oral Mucosa every 12 hours  collagenase Ointment 1 Application(s) Topical daily  enoxaparin Injectable 40 milliGRAM(s) SubCutaneous daily  famotidine    Tablet 20 milliGRAM(s) Oral daily  fentaNYL   Patch  12 MICROgram(s)/Hr 1 Patch Transdermal every 72 hours  ferrous    sulfate 325 milliGRAM(s) Oral two times a day  mirtazapine 15 milliGRAM(s) Oral daily  multivitamin 1 Tablet(s) Oral daily  OXcarbazepine 600 milliGRAM(s) Oral two times a day  polyethylene glycol 3350 17 Gram(s) Oral at bedtime  predniSONE   Tablet 20 milliGRAM(s) Oral daily  propranolol 20 milliGRAM(s) Oral every 6 hours  senna Syrup 10 milliLiter(s) Oral at bedtime  sucralfate suspension 1 Gram(s) Oral every 6 hours    MEDICATIONS  (PRN):  acetaminophen    Suspension .. 650 milliGRAM(s) Oral every 6 hours PRN Temp greater or equal to 38C (100.4F), Mild Pain (1 - 3)  ALBUTerol   0.042% 1.25 milliGRAM(s) Nebulizer four times a day PRN Shortness of Breath and/or Wheezing  aluminum hydroxide/magnesium hydroxide/simethicone Suspension 30 milliLiter(s) Oral every 4 hours PRN Dyspepsia  melatonin 5 milliGRAM(s) Oral at bedtime PRN Insomnia  oxyCODONE    IR 5 milliGRAM(s) Oral every 6 hours PRN Mild Pain (1-3) or Moderate Pain (4-6)  oxyCODONE    IR 10 milliGRAM(s) Oral every 6 hours PRN Severe Pain (7 - 10)  simethicone drops 40 milliGRAM(s) Oral every 6 hours PRN Gas      Allergies    IV Contrast (Anaphylaxis)  shellfish. (Anaphylaxis)    Intolerances    lorazepam (Other (Severe))      Vital Signs Last 24 Hrs  T(C): 36.8 (16 Mar 2020 12:00), Max: 37.1 (15 Mar 2020 15:55)  T(F): 98.2 (16 Mar 2020 12:00), Max: 98.8 (15 Mar 2020 15:55)  HR: 86 (16 Mar 2020 12:00) (84 - 120)  BP: 109/59 (16 Mar 2020 12:00) (96/58 - 122/58)  BP(mean): 80 (16 Mar 2020 12:00) (66 - 85)  RR: 34 (16 Mar 2020 12:00) (31 - 44)  SpO2: 100% (16 Mar 2020 12:00) (89% - 100%)  Daily     Daily     PHYSICAL EXAM:  GENERAL: appears chronically ill  HEAD:  Trach  EYES: EOMI  NECK: Supple, neck  veins full  NERVOUS SYSTEM:  Awake, nonverbal  CHEST/LUNG: dec BS B/L  HEART: Regular rate and rhythm  ABDOMEN: Soft, Nontender, Nondistended; Bowel sounds present  EXTREMITIES:  No edema      LABS:                        7.7    18.09 )-----------( 726      ( 16 Mar 2020 05:06 )             25.8     03-16    135  |  86<L>  |  28.0<H>  ----------------------------<  163<H>  4.7   |  38.0<H>  |  0.23<L>    Ca    9.1      16 Mar 2020 05:06            ABG - ( 15 Mar 2020 09:18 )  pH, Arterial: 7.38  pH, Blood: x     /  pCO2: 74    /  pO2: 63    / HCO3: 40    / Base Excess: 15.9  /  SaO2: 92                    RADIOLOGY & ADDITIONAL TESTS:

## 2020-03-16 NOTE — PROGRESS NOTE ADULT - ASSESSMENT
HypoNatremia  Serum Na 135 today improved  Severe protein calorie Malnutrition  decreased effective solutes  Looks clinically hypovolemic  Improvement w NS  Urine osm elevated  Has h/o respiratory failure +trach vent dependent    Will follow

## 2020-03-17 LAB
ANION GAP SERPL CALC-SCNC: 5 MMOL/L — SIGNIFICANT CHANGE UP (ref 5–17)
ANION GAP SERPL CALC-SCNC: 7 MMOL/L — SIGNIFICANT CHANGE UP (ref 5–17)
BASE EXCESS BLDA CALC-SCNC: 19.9 MMOL/L — HIGH (ref -3–3)
BASE EXCESS BLDA CALC-SCNC: 21.9 MMOL/L — HIGH (ref -3–3)
BLOOD GAS COMMENTS ARTERIAL: SIGNIFICANT CHANGE UP
BLOOD GAS COMMENTS ARTERIAL: SIGNIFICANT CHANGE UP
BUN SERPL-MCNC: 27 MG/DL — HIGH (ref 8–20)
BUN SERPL-MCNC: 29 MG/DL — HIGH (ref 8–20)
CALCIUM SERPL-MCNC: 9.7 MG/DL — SIGNIFICANT CHANGE UP (ref 8.6–10.2)
CALCIUM SERPL-MCNC: 9.8 MG/DL — SIGNIFICANT CHANGE UP (ref 8.6–10.2)
CHLORIDE SERPL-SCNC: 84 MMOL/L — LOW (ref 98–107)
CHLORIDE SERPL-SCNC: 86 MMOL/L — LOW (ref 98–107)
CO2 SERPL-SCNC: 42 MMOL/L — HIGH (ref 22–29)
CO2 SERPL-SCNC: 45 MMOL/L — CRITICAL HIGH (ref 22–29)
CREAT SERPL-MCNC: 0.25 MG/DL — LOW (ref 0.5–1.3)
CREAT SERPL-MCNC: 0.32 MG/DL — LOW (ref 0.5–1.3)
GAS PNL BLDA: SIGNIFICANT CHANGE UP
GAS PNL BLDA: SIGNIFICANT CHANGE UP
GLUCOSE BLDC GLUCOMTR-MCNC: 161 MG/DL — HIGH (ref 70–99)
GLUCOSE BLDC GLUCOMTR-MCNC: 374 MG/DL — HIGH (ref 70–99)
GLUCOSE SERPL-MCNC: 111 MG/DL — HIGH (ref 70–99)
GLUCOSE SERPL-MCNC: 133 MG/DL — HIGH (ref 70–99)
HCO3 BLDA-SCNC: 44 MMOL/L — HIGH (ref 20–26)
HCO3 BLDA-SCNC: 46 MMOL/L — HIGH (ref 20–26)
HCT VFR BLD CALC: 32.5 % — LOW (ref 34.5–45)
HGB BLD-MCNC: 9.3 G/DL — LOW (ref 11.5–15.5)
HOROWITZ INDEX BLDA+IHG-RTO: 30 — SIGNIFICANT CHANGE UP
MCHC RBC-ENTMCNC: 26.6 PG — LOW (ref 27–34)
MCHC RBC-ENTMCNC: 28.6 GM/DL — LOW (ref 32–36)
MCV RBC AUTO: 92.9 FL — SIGNIFICANT CHANGE UP (ref 80–100)
PCO2 BLDA: 102 MMHG — CRITICAL HIGH (ref 35–45)
PCO2 BLDA: 82 MMHG — CRITICAL HIGH (ref 35–45)
PH BLDA: 7.29 — LOW (ref 7.35–7.45)
PH BLDA: 7.39 — SIGNIFICANT CHANGE UP (ref 7.35–7.45)
PLATELET # BLD AUTO: 795 K/UL — HIGH (ref 150–400)
PO2 BLDA: 44 MMHG — CRITICAL LOW (ref 83–108)
PO2 BLDA: 54 MMHG — LOW (ref 83–108)
POTASSIUM SERPL-MCNC: 5.6 MMOL/L — HIGH (ref 3.5–5.3)
POTASSIUM SERPL-MCNC: 5.6 MMOL/L — HIGH (ref 3.5–5.3)
POTASSIUM SERPL-SCNC: 5.6 MMOL/L — HIGH (ref 3.5–5.3)
POTASSIUM SERPL-SCNC: 5.6 MMOL/L — HIGH (ref 3.5–5.3)
RBC # BLD: 3.5 M/UL — LOW (ref 3.8–5.2)
RBC # FLD: 17.1 % — HIGH (ref 10.3–14.5)
SAO2 % BLDA: 74 % — LOW (ref 95–99)
SAO2 % BLDA: 89 % — LOW (ref 95–99)
SODIUM SERPL-SCNC: 133 MMOL/L — LOW (ref 135–145)
SODIUM SERPL-SCNC: 136 MMOL/L — SIGNIFICANT CHANGE UP (ref 135–145)
WBC # BLD: 15.04 K/UL — HIGH (ref 3.8–10.5)
WBC # FLD AUTO: 15.04 K/UL — HIGH (ref 3.8–10.5)

## 2020-03-17 PROCEDURE — 99232 SBSQ HOSP IP/OBS MODERATE 35: CPT

## 2020-03-17 PROCEDURE — 71045 X-RAY EXAM CHEST 1 VIEW: CPT | Mod: 26

## 2020-03-17 RX ORDER — SODIUM POLYSTYRENE SULFONATE 4.1 MEQ/G
30 POWDER, FOR SUSPENSION ORAL ONCE
Refills: 0 | Status: DISCONTINUED | OUTPATIENT
Start: 2020-03-17 | End: 2020-03-17

## 2020-03-17 RX ORDER — INSULIN HUMAN 100 [IU]/ML
10 INJECTION, SOLUTION SUBCUTANEOUS ONCE
Refills: 0 | Status: COMPLETED | OUTPATIENT
Start: 2020-03-17 | End: 2020-03-17

## 2020-03-17 RX ORDER — DEXTROSE 50 % IN WATER 50 %
25 SYRINGE (ML) INTRAVENOUS ONCE
Refills: 0 | Status: COMPLETED | OUTPATIENT
Start: 2020-03-17 | End: 2020-03-17

## 2020-03-17 RX ORDER — SODIUM POLYSTYRENE SULFONATE 4.1 MEQ/G
30 POWDER, FOR SUSPENSION ORAL ONCE
Refills: 0 | Status: COMPLETED | OUTPATIENT
Start: 2020-03-17 | End: 2020-03-17

## 2020-03-17 RX ADMIN — Medication 650 MILLIGRAM(S): at 01:21

## 2020-03-17 RX ADMIN — Medication 325 MILLIGRAM(S): at 17:04

## 2020-03-17 RX ADMIN — FENTANYL CITRATE 1 PATCH: 50 INJECTION INTRAVENOUS at 19:55

## 2020-03-17 RX ADMIN — SENNA PLUS 10 MILLILITER(S): 8.6 TABLET ORAL at 23:00

## 2020-03-17 RX ADMIN — OXCARBAZEPINE 600 MILLIGRAM(S): 300 TABLET, FILM COATED ORAL at 05:38

## 2020-03-17 RX ADMIN — Medication 650 MILLIGRAM(S): at 02:00

## 2020-03-17 RX ADMIN — CHLORHEXIDINE GLUCONATE 15 MILLILITER(S): 213 SOLUTION TOPICAL at 05:34

## 2020-03-17 RX ADMIN — Medication 25 GRAM(S): at 06:51

## 2020-03-17 RX ADMIN — Medication 1 GRAM(S): at 06:02

## 2020-03-17 RX ADMIN — FAMOTIDINE 20 MILLIGRAM(S): 10 INJECTION INTRAVENOUS at 11:02

## 2020-03-17 RX ADMIN — SODIUM POLYSTYRENE SULFONATE 30 GRAM(S): 4.1 POWDER, FOR SUSPENSION ORAL at 11:03

## 2020-03-17 RX ADMIN — INSULIN HUMAN 10 UNIT(S): 100 INJECTION, SOLUTION SUBCUTANEOUS at 06:59

## 2020-03-17 RX ADMIN — POLYETHYLENE GLYCOL 3350 17 GRAM(S): 17 POWDER, FOR SOLUTION ORAL at 22:57

## 2020-03-17 RX ADMIN — FENTANYL CITRATE 1 PATCH: 50 INJECTION INTRAVENOUS at 07:29

## 2020-03-17 RX ADMIN — Medication 1 APPLICATION(S): at 11:02

## 2020-03-17 RX ADMIN — ENOXAPARIN SODIUM 40 MILLIGRAM(S): 100 INJECTION SUBCUTANEOUS at 11:01

## 2020-03-17 RX ADMIN — Medication 1 GRAM(S): at 17:03

## 2020-03-17 RX ADMIN — FENTANYL CITRATE 1 PATCH: 50 INJECTION INTRAVENOUS at 02:00

## 2020-03-17 RX ADMIN — Medication 20 MILLIGRAM(S): at 05:38

## 2020-03-17 RX ADMIN — Medication 1 TABLET(S): at 11:02

## 2020-03-17 RX ADMIN — Medication 325 MILLIGRAM(S): at 05:38

## 2020-03-17 RX ADMIN — Medication 1 GRAM(S): at 11:01

## 2020-03-17 RX ADMIN — ATOVAQUONE 1500 MILLIGRAM(S): 750 SUSPENSION ORAL at 11:02

## 2020-03-17 RX ADMIN — Medication 500 MILLIGRAM(S): at 11:01

## 2020-03-17 RX ADMIN — CHLORHEXIDINE GLUCONATE 15 MILLILITER(S): 213 SOLUTION TOPICAL at 17:03

## 2020-03-17 RX ADMIN — OXCARBAZEPINE 600 MILLIGRAM(S): 300 TABLET, FILM COATED ORAL at 17:03

## 2020-03-17 NOTE — PROVIDER CONTACT NOTE (EICU) - ASSESSMENT
Patient is currently stable at 7.29, can increase rate to 32, PS 32.    Please consult palliative care for goals of care, she has terminal IPF
Reviewed CT chest. Significant fibrosis with traction bronchiectasis and trapped lung. Consolidative process at bilateral bases.  Based on IBW of 66kg, 6cc/kg would be 400.

## 2020-03-17 NOTE — PROGRESS NOTE ADULT - ASSESSMENT
HypoNatremia  Severe protein calorie Malnutrition  decreased effective solutes  Has h/o respiratory failure +trach vent dependent    Hyperkalemia   Nepro feeds   S/P Kayexalate and meds   BMP to be repeated

## 2020-03-17 NOTE — CHART NOTE - NSCHARTNOTEFT_GEN_A_CORE
Late Entry  Called by RN after pt had a high end tidal CO2 >80.  Ordered a stat ABG.    ABG 7.29/102/44/44/74 vented  Upon my arrival, pt's vent settings being changed by ICU PA as per recommendation of eICU MD.  Post changing tidal volume to 400, EtCO2 dropped to 67, pulse Ox 93%  AM ABG ordered  RN to continue to monitor and escalate prn.

## 2020-03-17 NOTE — PROVIDER CONTACT NOTE (EICU) - RECOMMENDATIONS
Will remain in SDU, no need for ICU admission.  Pulmonary team followup in the morning
Increase IP and titrate upwards to target .  Given that significant fibrosis is present, plateau may be higher than 30, but this would be reflective of her underlying chronic pathological process, viz., a decreased lung compliance.  As the patient is increasingly hypercapneic, ventilation should take precedence at this time, but only to target pCO2 of 70-80. Patient is obviously chronic hypercapneic with BE of 20.

## 2020-03-17 NOTE — PROGRESS NOTE ADULT - ASSESSMENT
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 Patient was discharged to [facility], on the day of arrival to [facility], from [facility]. Upon arrival to [facility] pt was placed on volume control ventilation at which time she desaturated, became unresponsive and in acute respiratory distress.  Admitted for evaluation, seen by CT Surgery, Pulmonary and critical care.    #Metabolic encephalopathy - due to recurrent hypercarbia.  Supportive care.  Holding Remeron, Oxycodone per discussion with Daughter.   #Anxiety - and tachycardic at times.  Continue Propranolol  #Acute on chronic respiratory failure; Small R Pneumothorax, stable; Severe ILD :  Despite full vent support on pressure support, she is still hypercarbic at times.  Poor prognosis d/w daughter Eva.  Pulmonary followup.  Cont prednisone 20mg for ILD with Atovaquone for PCP prophylaxis.    #Acute on Chronic blood loss anemia - Stool OB was negative.  Monitor H/H, appears to be stable.  #Right Brachial Vein Thrombosis & Left Femoral DVT, present on arrival; due to line in that arm previously; since removed.  Repeat U/S showed no DVT in UE/ LEs.  Currently off anticoagulation due to bleed.  Given comorbid conditions and extensive conversation with family on 3/15, will observe off anticoagulation.    #Chronic Pain - Pt appears to be more comfortable.  No somnolence.  Restart Duragesic.    #Severe protein calorie Malnutrition - - cont TF: Nepro @45ml/hr for 24hrs.  #H/o Constipation, cont bowel regimen  #h/o Sacrum/coccyx pressure ulcer, present on admission - cont daily wound care, cont Santyl  # DVT Prophylaxis - Lovenox subcut

## 2020-03-17 NOTE — PROGRESS NOTE ADULT - SUBJECTIVE AND OBJECTIVE BOX
Patient: ROMIE VIRAMONTES [id number] 60y Female                           Internal Medicine Hospitalist Progress Note    Seen in MICU.  Events noted.  Overnight, found to have ABG showing 7.29/102/44.  Inspiratory pressure increased by EICU.  Repeat PCO2 82.    ____________________PHYSICAL EXAM:  GENERAL:  NAD, Arousable, somnolent.   HEENT: NCAT  CARDIOVASCULAR:  S1, S2  LUNGS: CTAB  ABDOMEN:  soft, (-) tenderness, (-) distension, (+) bowel sounds, (-) guarding, (-) rebound (-) rigidity  EXTREMITIES:  no cyanosis / clubbing / edema.   NEURO: able to move RUE / RLE.   SKIN: Sacral Unstageable ~ 5x4cm ulcer with slough  ____________________    VITALS:  Vital Signs Last 24 Hrs  T(C): 37.6 (17 Mar 2020 08:00), Max: 38 (17 Mar 2020 01:00)  T(F): 99.7 (17 Mar 2020 08:00), Max: 100.4 (17 Mar 2020 01:00)  HR: 90 (17 Mar 2020 08:30) (83 - 100)  BP: 96/55 (17 Mar 2020 08:00) (89/51 - 114/62)  BP(mean): 72 (17 Mar 2020 08:00) (63 - 82)  RR: 43 (17 Mar 2020 08:00) (34 - 44)  SpO2: 90% (17 Mar 2020 08:30) (90% - 100%) Daily     Daily Weight in k.5 (17 Mar 2020 04:00)  CAPILLARY BLOOD GLUCOSE      POCT Blood Glucose.: 161 mg/dL (17 Mar 2020 08:05)  POCT Blood Glucose.: 374 mg/dL (17 Mar 2020 06:57)    I&O's Summary    16 Mar 2020 07:01  -  17 Mar 2020 07:00  --------------------------------------------------------  IN: 1800 mL / OUT: 905 mL / NET: 895 mL    17 Mar 2020 07:01  -  17 Mar 2020 11:23  --------------------------------------------------------  IN: 240 mL / OUT: 125 mL / NET: 115 mL        LABS:                        9.3    15.04 )-----------( 795      ( 17 Mar 2020 04:14 )             32.5     03-17    133<L>  |  84<L>  |  27.0<H>  ----------------------------<  133<H>  5.6<H>   |  42.0<H>  |  0.25<L>    Ca    9.7      17 Mar 2020 08:49                    MEDICATIONS:  acetaminophen    Suspension .. 650 milliGRAM(s) Oral every 6 hours PRN  ALBUTerol   0.042% 1.25 milliGRAM(s) Nebulizer four times a day PRN  aluminum hydroxide/magnesium hydroxide/simethicone Suspension 30 milliLiter(s) Oral every 4 hours PRN  ascorbic acid 500 milliGRAM(s) Oral daily  atovaquone Suspension 1500 milliGRAM(s) Oral daily  chlorhexidine 0.12% Liquid 15 milliLiter(s) Oral Mucosa every 12 hours  collagenase Ointment 1 Application(s) Topical daily  enoxaparin Injectable 40 milliGRAM(s) SubCutaneous daily  famotidine    Tablet 20 milliGRAM(s) Oral daily  fentaNYL   Patch  12 MICROgram(s)/Hr 1 Patch Transdermal every 72 hours  ferrous    sulfate 325 milliGRAM(s) Oral two times a day  melatonin 5 milliGRAM(s) Oral at bedtime PRN  multivitamin 1 Tablet(s) Oral daily  OXcarbazepine 600 milliGRAM(s) Oral two times a day  polyethylene glycol 3350 17 Gram(s) Oral at bedtime  predniSONE   Tablet 20 milliGRAM(s) Oral daily  propranolol 20 milliGRAM(s) Oral every 6 hours  senna Syrup 10 milliLiter(s) Oral at bedtime  simethicone drops 40 milliGRAM(s) Oral every 6 hours PRN  sucralfate suspension 1 Gram(s) Oral every 6 hours

## 2020-03-17 NOTE — PROVIDER CONTACT NOTE (EICU) - BACKGROUND
consult called for vent management  7.29/97/85/96% Baseline PCO2 in 70s.  Vent setting was pressure control, RR at 28, PS 28
60F bronchiectasis ILD chronic respiratory failure trache / vent. On PCV 28/+30/30%/+5. Breathing in mid-30s, getting  [MV 14]

## 2020-03-17 NOTE — DISCHARGE NOTE ADULT - PAIN PRESENT
Benefits, risks, and possible complications of procedure explained to patient/caregiver who verbalized understanding and gave written consent.
No

## 2020-03-17 NOTE — PROGRESS NOTE ADULT - SUBJECTIVE AND OBJECTIVE BOX
NEPHROLOGY INTERVAL HPI/OVERNIGHT EVENTS:    Interim noted   Meds reviewed   off IVF   Feeds noted     MEDICATIONS  (STANDING):  ascorbic acid 500 milliGRAM(s) Oral daily  atovaquone Suspension 1500 milliGRAM(s) Oral daily  chlorhexidine 0.12% Liquid 15 milliLiter(s) Oral Mucosa every 12 hours  collagenase Ointment 1 Application(s) Topical daily  enoxaparin Injectable 40 milliGRAM(s) SubCutaneous daily  famotidine    Tablet 20 milliGRAM(s) Oral daily  fentaNYL   Patch  12 MICROgram(s)/Hr 1 Patch Transdermal every 72 hours  ferrous    sulfate 325 milliGRAM(s) Oral two times a day  multivitamin 1 Tablet(s) Oral daily  OXcarbazepine 600 milliGRAM(s) Oral two times a day  polyethylene glycol 3350 17 Gram(s) Oral at bedtime  predniSONE   Tablet 20 milliGRAM(s) Oral daily  propranolol 20 milliGRAM(s) Oral every 6 hours  senna Syrup 10 milliLiter(s) Oral at bedtime  sucralfate suspension 1 Gram(s) Oral every 6 hours    MEDICATIONS  (PRN):  acetaminophen    Suspension .. 650 milliGRAM(s) Oral every 6 hours PRN Temp greater or equal to 38C (100.4F), Mild Pain (1 - 3)  ALBUTerol   0.042% 1.25 milliGRAM(s) Nebulizer four times a day PRN Shortness of Breath and/or Wheezing  aluminum hydroxide/magnesium hydroxide/simethicone Suspension 30 milliLiter(s) Oral every 4 hours PRN Dyspepsia  melatonin 5 milliGRAM(s) Oral at bedtime PRN Insomnia  simethicone drops 40 milliGRAM(s) Oral every 6 hours PRN Gas      Allergies    IV Contrast (Anaphylaxis)  shellfish. (Anaphylaxis)    Intolerances    lorazepam (Other (Severe))          Vital Signs Last 24 Hrs  T(C): 37.1 (17 Mar 2020 12:00), Max: 38 (17 Mar 2020 01:00)  T(F): 98.8 (17 Mar 2020 12:00), Max: 100.4 (17 Mar 2020 01:00)  HR: 90 (17 Mar 2020 12:45) (83 - 100)  BP: 96/56 (17 Mar 2020 12:00) (89/51 - 114/62)  BP(mean): 70 (17 Mar 2020 12:00) (63 - 82)  RR: 44 (17 Mar 2020 12:00) (38 - 44)  SpO2: 91% (17 Mar 2020 12:45) (89% - 100%)  Daily     Daily Weight in k.5 (17 Mar 2020 04:00)  I&O's Detail    16 Mar 2020 07:  -  17 Mar 2020 07:00  --------------------------------------------------------  IN:    Enteral Tube Flush: 360 mL    Jevity: 1440 mL  Total IN: 1800 mL    OUT:    Indwelling Catheter - Urethral: 905 mL  Total OUT: 905 mL    Total NET: 895 mL      17 Mar 2020 07:  -  17 Mar 2020 13:19  --------------------------------------------------------  IN:    Enteral Tube Flush: 120 mL    Jevity: 360 mL  Total IN: 480 mL    OUT:    Indwelling Catheter - Urethral: 245 mL  Total OUT: 245 mL    Total NET: 235 mL        I&O's Summary    16 Mar 2020 07:  -  17 Mar 2020 07:00  --------------------------------------------------------  IN: 1800 mL / OUT: 905 mL / NET: 895 mL    17 Mar 2020 07:  -  17 Mar 2020 13:19  --------------------------------------------------------  IN: 480 mL / OUT: 245 mL / NET: 235 mL        PHYSICAL EXAM:  GENERAL: appears chronically ill  HEAD:  Trach  EYES: EOMI  NECK: Supple, neck  veins full  CHEST/LUNG: dec BS B/L  HEART: Regular rate and rhythm  ABDOMEN: Soft, Nontender, Nondistended; Bowel sounds present  EXTREMITIES:  No edema  LABS:                        9.3    15.04 )-----------( 795      ( 17 Mar 2020 04:14 )             32.5     03-17    133<L>  |  84<L>  |  27.0<H>  ----------------------------<  133<H>  5.6<H>   |  42.0<H>  |  0.25<L>    Ca    9.7      17 Mar 2020 08:49            ABG - ( 17 Mar 2020 06:07 )  pH, Arterial: 7.39  pH, Blood: x     /  pCO2: 82    /  pO2: 54    / HCO3: 46    / Base Excess: 21.9  /  SaO2: 89                    RADIOLOGY & ADDITIONAL TESTS:

## 2020-03-18 LAB
ANION GAP SERPL CALC-SCNC: 5 MMOL/L — SIGNIFICANT CHANGE UP (ref 5–17)
BASE EXCESS BLDA CALC-SCNC: 27.1 MMOL/L — HIGH (ref -3–3)
BLOOD GAS COMMENTS ARTERIAL: SIGNIFICANT CHANGE UP
BUN SERPL-MCNC: 30 MG/DL — HIGH (ref 8–20)
CALCIUM SERPL-MCNC: 9.2 MG/DL — SIGNIFICANT CHANGE UP (ref 8.6–10.2)
CHLORIDE SERPL-SCNC: 84 MMOL/L — LOW (ref 98–107)
CHLORIDE UR-SCNC: 33 MMOL/L — SIGNIFICANT CHANGE UP
CO2 SERPL-SCNC: 48 MMOL/L — CRITICAL HIGH (ref 22–29)
CREAT ?TM UR-MCNC: 28 MG/DL — SIGNIFICANT CHANGE UP
CREAT SERPL-MCNC: 0.28 MG/DL — LOW (ref 0.5–1.3)
GAS PNL BLDA: SIGNIFICANT CHANGE UP
GLUCOSE SERPL-MCNC: 130 MG/DL — HIGH (ref 70–99)
HCO3 BLDA-SCNC: 53 MMOL/L — HIGH (ref 20–26)
HCT VFR BLD CALC: 27.4 % — LOW (ref 34.5–45)
HGB BLD-MCNC: 7.8 G/DL — LOW (ref 11.5–15.5)
HOROWITZ INDEX BLDA+IHG-RTO: 32 — SIGNIFICANT CHANGE UP
MCHC RBC-ENTMCNC: 26.4 PG — LOW (ref 27–34)
MCHC RBC-ENTMCNC: 28.5 GM/DL — LOW (ref 32–36)
MCV RBC AUTO: 92.9 FL — SIGNIFICANT CHANGE UP (ref 80–100)
NRBC # BLD: 1 /100 WBCS — HIGH (ref 0–0)
PCO2 BLDA: 68 MMHG — HIGH (ref 35–45)
PH BLDA: 7.5 — HIGH (ref 7.35–7.45)
PLATELET # BLD AUTO: 721 K/UL — HIGH (ref 150–400)
PO2 BLDA: 93 MMHG — SIGNIFICANT CHANGE UP (ref 83–108)
POTASSIUM SERPL-MCNC: 4.4 MMOL/L — SIGNIFICANT CHANGE UP (ref 3.5–5.3)
POTASSIUM SERPL-SCNC: 4.4 MMOL/L — SIGNIFICANT CHANGE UP (ref 3.5–5.3)
POTASSIUM UR-SCNC: 23 MMOL/L — SIGNIFICANT CHANGE UP
RBC # BLD: 2.95 M/UL — LOW (ref 3.8–5.2)
RBC # FLD: 17.3 % — HIGH (ref 10.3–14.5)
SAO2 % BLDA: 100 % — HIGH (ref 95–99)
SODIUM SERPL-SCNC: 137 MMOL/L — SIGNIFICANT CHANGE UP (ref 135–145)
SODIUM UR-SCNC: 46 MMOL/L — SIGNIFICANT CHANGE UP
WBC # BLD: 15.56 K/UL — HIGH (ref 3.8–10.5)
WBC # FLD AUTO: 15.56 K/UL — HIGH (ref 3.8–10.5)

## 2020-03-18 PROCEDURE — 99233 SBSQ HOSP IP/OBS HIGH 50: CPT

## 2020-03-18 PROCEDURE — 99223 1ST HOSP IP/OBS HIGH 75: CPT

## 2020-03-18 PROCEDURE — 99291 CRITICAL CARE FIRST HOUR: CPT

## 2020-03-18 RX ORDER — SODIUM CHLORIDE 9 MG/ML
1000 INJECTION INTRAMUSCULAR; INTRAVENOUS; SUBCUTANEOUS
Refills: 0 | Status: DISCONTINUED | OUTPATIENT
Start: 2020-03-18 | End: 2020-03-20

## 2020-03-18 RX ADMIN — Medication 325 MILLIGRAM(S): at 17:15

## 2020-03-18 RX ADMIN — ATOVAQUONE 1500 MILLIGRAM(S): 750 SUSPENSION ORAL at 11:01

## 2020-03-18 RX ADMIN — Medication 20 MILLIGRAM(S): at 06:14

## 2020-03-18 RX ADMIN — FENTANYL CITRATE 1 PATCH: 50 INJECTION INTRAVENOUS at 07:33

## 2020-03-18 RX ADMIN — Medication 1 GRAM(S): at 23:00

## 2020-03-18 RX ADMIN — Medication 1 GRAM(S): at 06:13

## 2020-03-18 RX ADMIN — OXCARBAZEPINE 600 MILLIGRAM(S): 300 TABLET, FILM COATED ORAL at 17:17

## 2020-03-18 RX ADMIN — Medication 650 MILLIGRAM(S): at 01:30

## 2020-03-18 RX ADMIN — FAMOTIDINE 20 MILLIGRAM(S): 10 INJECTION INTRAVENOUS at 11:02

## 2020-03-18 RX ADMIN — Medication 325 MILLIGRAM(S): at 06:13

## 2020-03-18 RX ADMIN — Medication 1 TABLET(S): at 11:01

## 2020-03-18 RX ADMIN — Medication 650 MILLIGRAM(S): at 00:41

## 2020-03-18 RX ADMIN — Medication 1 GRAM(S): at 17:15

## 2020-03-18 RX ADMIN — Medication 1 GRAM(S): at 11:01

## 2020-03-18 RX ADMIN — ENOXAPARIN SODIUM 40 MILLIGRAM(S): 100 INJECTION SUBCUTANEOUS at 11:02

## 2020-03-18 RX ADMIN — CHLORHEXIDINE GLUCONATE 15 MILLILITER(S): 213 SOLUTION TOPICAL at 06:09

## 2020-03-18 RX ADMIN — Medication 1 GRAM(S): at 00:41

## 2020-03-18 RX ADMIN — Medication 1 APPLICATION(S): at 11:01

## 2020-03-18 RX ADMIN — OXCARBAZEPINE 600 MILLIGRAM(S): 300 TABLET, FILM COATED ORAL at 06:14

## 2020-03-18 RX ADMIN — SODIUM CHLORIDE 83 MILLILITER(S): 9 INJECTION INTRAMUSCULAR; INTRAVENOUS; SUBCUTANEOUS at 17:00

## 2020-03-18 RX ADMIN — CHLORHEXIDINE GLUCONATE 15 MILLILITER(S): 213 SOLUTION TOPICAL at 17:15

## 2020-03-18 RX ADMIN — Medication 500 MILLIGRAM(S): at 11:02

## 2020-03-18 NOTE — PROGRESS NOTE ADULT - ASSESSMENT
Chronic hypoxemic and hypercapnic respiratory failure though pH is now elevated  H/o CVA  Chronic R apical Ptx  ILD, NSIP like with peribronchial GG/ consolidation, traction bronchiectasis  currently stable on PC vent;  cc   Hx Upper extremity DVT related to catheter which has been removed, current duplex negative bilat UE  thus off  full AC  Anemic  Leukocytosis  Current ABG with compensated hypercarbia  Last CXR with stable changes    Rec:    Continue vent support with chronic PTX  Follow HCT  Placed on prednisone 20 mg and atovaquone from [facility]  Follow wbc which is trending higher again with fever over night  Off abx per ID but consider re-evaluation given persistent leukocytosis and possible worsening bibasilar opacities  Last echocardiogram with normal LV  Will need to find NH that take PC vent prior to any further adjustments when stable for tx  Prognosis remains extremely guarded  Needs palliative input, goals of care  Overall poor prognosis  Pt remains critically ill    > 35 min spent in the evaluation and care of this critically ill pt    D/w medicine  D/w palliative care

## 2020-03-18 NOTE — CONSULT NOTE ADULT - ASSESSMENT
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 Recently admitted at [facility] for hypoxic respiratory failure 2/2 worsening ILD and HCAP, transfer to [facility] at family's request. While at [facility] required intubation and ended up with trach and PEG. Transferred from [facility] for unresponsiveness most likely related to incorrect vent settings. Patient needs to be on pressure control ventilation to avoid high pressures in order to achieve TV. While at [facility] they had her on volume control which lead to increased pressures, hypercapnia and unresponsiveness. Once settings were corrected patient significantly improved.     Plan Discussed with ICU attending Dr. Perrin     Problem List:  1) Acute on chronic hypercapnic respiratory failure  2) ILD   3) UTI     At this time patient does not require admission to the ICU as she is stable on Pressure control ventilation (20/30/5/40) taking TV of 250-300ml, peak pressure mid to high 30s. Her unresponsiveness was from hypercapnia from incorrect vent settings, now improved  -No evidence for arrythmia or ischemia on EKG, trop negative, no need to cardiac monitor   - Needs pulmonary consult  - needs social work and case management consults  - Would continue with bronchodilators and daily prednisone   - Would continue medications she was discharge to [facility] on from [facility]   -UA suggestive of UTI, has avendano from [facility], changed in ER  - Cover with ceftriaxone for now
59F with severe ILD, respiratory failure, s/p PEA arrest s/p trach (1/29), recent RLE/RUE thrombus, stage IV NHL s/p chemo/RT and stem cell transplant ( 2003 @ [facility] ? remission), previous CVA with left sided weakness, admitted from [facility] with change in mental status and acute on chronic hypercapnic respiratory failure, now obtunded, with fever and increased work of breathing with grave prognosis.     #1 Acute on chronic respiratory failure - ventilator dependence. pulmonary following. on steroids. PCP PPX. ? HCAP ? repeat lung imaging?   #2 Fever - ? repeat cultures and/or chest imaging?   #3 Encephalopathy - ? baseline, appears worse per documentation but seems to have some baseline waxing and waning encephalopathy.   #4 Encounter for palliative care - Dr. Ventura has discussed directives and goals in the last couple of days and family has desired all aggressive measures, will wait for further workup regarding encephalopathy and then call family to discuss big picture. Grave prognosis, patient is at end of life and highly unlikely that she will survive hospitalization.
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 After the trach and peg placement, patient was discharged to [facility].  Upon arrival to [facility] pt was placed on volume control ventilation at which time she immediately desaturated, became unresponsive and in acute respiratory distress. She was then transferred to [facility].   CT chest today with small right sided pneumothorax.  CTS consulted for evaluation.   She is also being treated for UTI.
HypoNatremia  etiology?  Looks clinically hypovolemic  Improvement w NS  will check Uric acid, Urine osm, Urine random Na  Has h/o respiratory failure +trach vent dependent    Will follow
Vent failure- acute on chronic hypercapnic, hypoxemic respiratory failure  multilobar infiltrates and ? apical  Ptx  hx of NHL ( remission), CVA and ILD/bronchiectasis on chronic pred/atovaquone from [facility]  recently transferred from [facility] on PC 30, did not tolerate AC at NH and brought to ER placed back on PC  on full AC for upper extremity one month ago, will repeat duplex  needs CT chest r/o ptx and better evaluate infiltrates new locally ? new from [facility]  ( CXR [facility] mult infiltrates)  back on PC, oxygenating well on 40%, PAP 37, Tv 210, abg noted, VE  8 l  check cultures, procalcitonin, no fever, has leucocytosis  ( on pred)  further input pending CT scan  unstable for NH or floor transfer currently, critical
58 yo female with PMHx unspecified pleural/parenchymal bronchiectatic process with pulmonary nodules, severe restrictive interstitial lung disease with chronic respiratory failure with spontaneous right-sided PTX (1/19) s/p trach 1/29, ventilator dependent, complicated by PEA cardiac arrest (1/29), RLE DVT and RUE occlusive thrombus, toxic metabolic encephalopathy, severe sepsis secondary to serratia PNA, stage Peg NHL [place]/p [place] [facility] 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 Patient was discharged to [facility] from [facility] after prolonged hospital course, developed acute altered mental status and acute on chronic respiratory failure attributed to being placed on A/C ventilation rather than prescribed PC. Vent augmented to PC, mentation improved, patient has remained stable and is planning for discharge.    Chronic respiratory failure secondary to end-stage bronchiectasis, ILD, complicated by chronic pneumothorax  Acute encephalopathy  Likely secondary to CO2 narcosis, acute on chronic hypercapnic respiratory failure  Vent augmented to PC 30/5, PIP 35, FiO2 weaned to 30% with improvement in mentation  EtCO2 monitoring started, EtCO2 ~20mmHg lower than ABG, trending to monitor for recurrent hypercapnea  Would hold further IV fluids given risk for worsening hypoxemia in the setting of underling IPF  Hemodynamically patient is stable, repeat ABG improved to baseline hypercapnea ~70 with vent augmentation, now compensated respiratory acidosis, and mentation has improved as well  Unlikely to gain additional benefit from ICU level of care at this time. Case d/w eICU Attending Dr. Delong, who agrees, and recommends Palliative Care consult. Please reconsult should clinical condition change.
60 y/o female with history of severe restrictive interstitial lung disease, s/p resp failure s/p trach 1/29, ventilator dependent, s/p PEA arrest (1/29), RLE DVT and RUE occlusive thrombus, toxic metabolic encephalopathy, severe sepsis secondary to serratia PNA, unspecified pleural/parenchymal bronchiectatic process with pulmonary nodules for which she has opposed steroid therapy (prior), spontaneous right-sided PTX (1/19), stage Peg NHL (s/p chemo/XRT and SCT received '03 at [facility] [place] - in remission) 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 Patient was discharged to [facility], on the day of arrival to [facility], from [facility]. Upon arrival to [facility] pt was placed on volume control ventilation at which time she immediately desaturated, became unresponsive and in acute respiratory distress.       Overall ILD, tracheostomy, leukocytosis  - was not discharged on antibiotics per Dc paperwork  - afebrile, leukocytosis on steroids  - Ct chest with chronic lung findings  - Blood cultures testing  - Sputum cx negative  - Procalcitonin level 0.16  - Ua + in the setting of chronic avendano, UCX negative  - DC ceftriaxone  - c.w atovaquone ppx  - Observe off abx  - Trend Fever  - Trend Leukocytosis    d/w patient, mother and Dr Mendoaz  Will Follow

## 2020-03-18 NOTE — PROGRESS NOTE ADULT - ASSESSMENT
Improved HypoNatremia  Severe protein calorie Malnutrition  decreased effective solutes  Has h/o respiratory failure +trach vent dependent  Na level stable     Metabolic alkalosis / chronic Resp acidosis   chronic compensatory component   superimposed posthypercapneic component   ? contraction alkalosis   Will add IV NS   Sends off urine studies   vent adjustments as per Pulmonary   ABG in am

## 2020-03-18 NOTE — PROGRESS NOTE ADULT - ASSESSMENT
Initial HPI:  This is a 60 y/o female with history of severe restrictive interstitial lung disease, s/p resp failure s/p trach 1/29, ventilator dependent, s/p PEA arrest (1/29), RLE DVT and RUE occlusive thrombus, toxic metabolic encephalopathy, severe sepsis secondary to serratia PNA, unspecified pleural/parenchymal bronchiectatic process with pulmonary nodules for which she has opposed steroid therapy (prior), spontaneous right-sided PTX (1/19), stage Peg NHL (s/p chemo/XRT and SCT received '03 at [facility] - in remission) 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 Patient was discharged to [facility], on the day of arrival to [facility], from [facility]. Upon arrival to [facility] pt was placed on volume control ventilation at which time she desaturated, became unresponsive and in acute respiratory distress.  Admitted for evaluation, seen by CT Surgery, Pulmonary and critical care.    #Metabolic encephalopathy - multifactorial.  Improving with improvement in hypercarbia.  Attempt CT head when respiratory status improves.  Holding Remeron, Oxycodone.  Will stop Duragesic.      #Anxiety - and tachycardic at times.  Continue Propranolol  #Acute on chronic respiratory failure; Small R Pneumothorax, stable; Severe ILD :   Poor prognosis d/w family and palliative care.  Pulmonary followup.  Cont prednisone 20mg for ILD with Atovaquone for PCP prophylaxis.    #Acute on Chronic blood loss anemia - Stool OB was negative.  Monitor H/H, appears to be stable.  #Right Brachial Vein Thrombosis & Left Femoral DVT, present on arrival; due to line in that arm previously; since removed.  Repeat U/S showed no DVT in UE/ LEs.  Currently off anticoagulation due to bleed.  Given comorbid conditions and extensive conversation with family on 3/15, will observe off anticoagulation.    #Chronic Pain - Pt appears to be more comfortable.  No somnolence.  Restart Duragesic.    #Severe protein calorie Malnutrition - - cont TF: Nepro @45ml/hr for 24hrs.  #H/o Constipation, cont bowel regimen  #h/o Sacrum/coccyx pressure ulcer, present on admission - cont daily wound care, cont Santyl  # Chronic Anemia - H/H appears to be stable.  No s/s of bleeding.    # DVT Prophylaxis - Lovenox subcut Initial HPI:  This is a 58 y/o female with history of severe restrictive interstitial lung disease, s/p resp failure s/p trach 1/29, ventilator dependent, s/p PEA arrest (1/29), RLE DVT and RUE occlusive thrombus, toxic metabolic encephalopathy, severe sepsis secondary to serratia PNA, unspecified pleural/parenchymal bronchiectatic process with pulmonary nodules for which she has opposed steroid therapy (prior), spontaneous right-sided PTX (1/19), stage Peg NHL (s/p chemo/XRT and SCT received '03 at [facility] [place] - in remission) 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 Patient was discharged to [facility], on the day of arrival to [facility], from [facility]. Upon arrival to [facility] pt was placed on volume control ventilation at which time she desaturated, became unresponsive and in acute respiratory distress.  Admitted for evaluation, seen by CT Surgery, Pulmonary and critical care.    #Metabolic encephalopathy - multifactorial.  Improving with improvement in hypercarbia.  Attempt CT head when respiratory status improves.  Holding Remeron, Oxycodone.  Will stop Duragesic.      #Anxiety - and tachycardic at times.  Continue Propranolol  #Fever - no recurrence.  CXR not suggestive of infiltrate.  Monitor off antibiotics.  If fever recurs, will need to   #Acute on chronic respiratory failure; Small R Pneumothorax, stable; Severe ILD :   Poor prognosis d/w family and palliative care.  Pulmonary followup.  Cont prednisone 20mg for ILD with Atovaquone for PCP prophylaxis.    #Acute on Chronic blood loss anemia - Stool OB was negative.  Monitor H/H,.  #Right Brachial Vein Thrombosis & Left Femoral DVT, present on arrival; due to line in that arm previously; since removed.  Repeat U/S showed no DVT in UE/ LEs.  Currently off anticoagulation due to bleed.  Given comorbid conditions and extensive conversation with family on 3/15, will observe off anticoagulation.    #Chronic Pain - Pt appears to be comfortable.  stopping Duragesic.  #Severe protein calorie Malnutrition - - cont TF: Nepro  #H/o Constipation, cont bowel regimen  #h/o Sacrum/coccyx pressure ulcer, present on admission - cont daily wound care, cont Santyl  # Chronic Anemia - H/H appears to be stable.  No s/s of bleeding.    # DVT Prophylaxis - Lovenox subcut

## 2020-03-18 NOTE — PROGRESS NOTE ADULT - SUBJECTIVE AND OBJECTIVE BOX
Patient: ROMIE VIRAMONTES [id number] 60y Female                           Internal Medicine Hospitalist Progress Note     and RN at bedside.  States pt closer to baseline.  No specific complaints.     ____________________PHYSICAL EXAM:  GENERAL:  NAD, Arousable, less somnolent.   HEENT: NCAT  CARDIOVASCULAR:  S1, S2  LUNGS: CTAB  ABDOMEN:  soft, (-) tenderness, (-) distension, (+) bowel sounds, (-) guarding, (-) rebound (-) rigidity  EXTREMITIES:  no cyanosis / clubbing / edema.   NEURO: able to move RUE / RLE.   SKIN: Sacral Unstageable ~ 5x4cm ulcer with slough  ____________________    VITALS:  Vital Signs Last 24 Hrs  T(C): 37.3 (18 Mar 2020 12:00), Max: 38.1 (18 Mar 2020 00:00)  T(F): 99.1 (18 Mar 2020 12:00), Max: 100.6 (18 Mar 2020 00:00)  HR: 86 (18 Mar 2020 14:00) (85 - 103)  BP: 92/53 (18 Mar 2020 14:00) (85/54 - 111/64)  BP(mean): 67 (18 Mar 2020 14:00) (64 - 83)  RR: 40 (18 Mar 2020 14:00) (33 - 44)  SpO2: 93% (18 Mar 2020 14:00) (93% - 100%) Daily     Daily Weight in k.3 (18 Mar 2020 04:01)  CAPILLARY BLOOD GLUCOSE        I&O's Summary    17 Mar 2020 07:01  -  18 Mar 2020 07:00  --------------------------------------------------------  IN: 1680 mL / OUT: 1135 mL / NET: 545 mL    18 Mar 2020 07:01  -  18 Mar 2020 14:16  --------------------------------------------------------  IN: 480 mL / OUT: 345 mL / NET: 135 mL        LABS:                        7.8    15.56 )-----------( 721      ( 18 Mar 2020 05:07 )             27.4     03-18    137  |  84<L>  |  30.0<H>  ----------------------------<  130<H>  4.4   |  48.0<HH>  |  0.28<L>    Ca    9.2      18 Mar 2020 05:07                    MEDICATIONS:  acetaminophen    Suspension .. 650 milliGRAM(s) Oral every 6 hours PRN  ALBUTerol   0.042% 1.25 milliGRAM(s) Nebulizer four times a day PRN  aluminum hydroxide/magnesium hydroxide/simethicone Suspension 30 milliLiter(s) Oral every 4 hours PRN  ascorbic acid 500 milliGRAM(s) Oral daily  atovaquone Suspension 1500 milliGRAM(s) Oral daily  chlorhexidine 0.12% Liquid 15 milliLiter(s) Oral Mucosa every 12 hours  collagenase Ointment 1 Application(s) Topical daily  enoxaparin Injectable 40 milliGRAM(s) SubCutaneous daily  famotidine    Tablet 20 milliGRAM(s) Oral daily  fentaNYL   Patch  12 MICROgram(s)/Hr 1 Patch Transdermal every 72 hours  ferrous    sulfate 325 milliGRAM(s) Oral two times a day  melatonin 5 milliGRAM(s) Oral at bedtime PRN  multivitamin 1 Tablet(s) Oral daily  OXcarbazepine 600 milliGRAM(s) Oral two times a day  polyethylene glycol 3350 17 Gram(s) Oral at bedtime  predniSONE   Tablet 20 milliGRAM(s) Oral daily  propranolol 20 milliGRAM(s) Oral every 6 hours  senna Syrup 10 milliLiter(s) Oral at bedtime  simethicone drops 40 milliGRAM(s) Oral every 6 hours PRN  sucralfate suspension 1 Gram(s) Oral every 6 hours Patient: ROMIE VIRAMONTES [id number] 60y Female                           Internal Medicine Hospitalist Progress Note     and RN at bedside.  States pt closer to baseline.  No specific complaints.  Tm100.6 overnight    ____________________PHYSICAL EXAM:  GENERAL:  NAD, Arousable, less somnolent.   HEENT: NCAT  CARDIOVASCULAR:  S1, S2  LUNGS: CTAB  ABDOMEN:  soft, (-) tenderness, (-) distension, (+) bowel sounds, (-) guarding, (-) rebound (-) rigidity  EXTREMITIES:  no cyanosis / clubbing / edema.   NEURO: able to move RUE / RLE.   SKIN: Sacral Unstageable ~ 5x4cm ulcer with slough  ____________________    VITALS:  Vital Signs Last 24 Hrs  T(C): 37.3 (18 Mar 2020 12:00), Max: 38.1 (18 Mar 2020 00:00)  T(F): 99.1 (18 Mar 2020 12:00), Max: 100.6 (18 Mar 2020 00:00)  HR: 86 (18 Mar 2020 14:00) (85 - 103)  BP: 92/53 (18 Mar 2020 14:00) (85/54 - 111/64)  BP(mean): 67 (18 Mar 2020 14:00) (64 - 83)  RR: 40 (18 Mar 2020 14:00) (33 - 44)  SpO2: 93% (18 Mar 2020 14:00) (93% - 100%) Daily     Daily Weight in k.3 (18 Mar 2020 04:01)  CAPILLARY BLOOD GLUCOSE        I&O's Summary    17 Mar 2020 07:01  -  18 Mar 2020 07:00  --------------------------------------------------------  IN: 1680 mL / OUT: 1135 mL / NET: 545 mL    18 Mar 2020 07:01  -  18 Mar 2020 14:16  --------------------------------------------------------  IN: 480 mL / OUT: 345 mL / NET: 135 mL        LABS:                        7.8    15.56 )-----------( 721      ( 18 Mar 2020 05:07 )             27.4     03-18    137  |  84<L>  |  30.0<H>  ----------------------------<  130<H>  4.4   |  48.0<HH>  |  0.28<L>    Ca    9.2      18 Mar 2020 05:07                    MEDICATIONS:  acetaminophen    Suspension .. 650 milliGRAM(s) Oral every 6 hours PRN  ALBUTerol   0.042% 1.25 milliGRAM(s) Nebulizer four times a day PRN  aluminum hydroxide/magnesium hydroxide/simethicone Suspension 30 milliLiter(s) Oral every 4 hours PRN  ascorbic acid 500 milliGRAM(s) Oral daily  atovaquone Suspension 1500 milliGRAM(s) Oral daily  chlorhexidine 0.12% Liquid 15 milliLiter(s) Oral Mucosa every 12 hours  collagenase Ointment 1 Application(s) Topical daily  enoxaparin Injectable 40 milliGRAM(s) SubCutaneous daily  famotidine    Tablet 20 milliGRAM(s) Oral daily  fentaNYL   Patch  12 MICROgram(s)/Hr 1 Patch Transdermal every 72 hours  ferrous    sulfate 325 milliGRAM(s) Oral two times a day  melatonin 5 milliGRAM(s) Oral at bedtime PRN  multivitamin 1 Tablet(s) Oral daily  OXcarbazepine 600 milliGRAM(s) Oral two times a day  polyethylene glycol 3350 17 Gram(s) Oral at bedtime  predniSONE   Tablet 20 milliGRAM(s) Oral daily  propranolol 20 milliGRAM(s) Oral every 6 hours  senna Syrup 10 milliLiter(s) Oral at bedtime  simethicone drops 40 milliGRAM(s) Oral every 6 hours PRN  sucralfate suspension 1 Gram(s) Oral every 6 hours

## 2020-03-18 NOTE — CONSULT NOTE ADULT - SUBJECTIVE AND OBJECTIVE BOX
Late entry: patient seen this morning around 11am    HPI: 60F with PMH as listed admitted 3/11 with acute on chronic respiratory distress and change in mental status. Patient recently admitted from 12/26 - 1/24 to [facility] with colitis/proctitis, HCAP, HCAP, acute on chronic respiratory failure eventually transferred to [facility] where she received trach/PEG, and went to [facility]. Patient unfortunately at end of life, but family has been unrealistic about goals of care.      PERTINENT PMH REVIEWED: Yes     PAST MEDICAL & SURGICAL HISTORY:  Interstitial lung disease: on home o2 prn  seizure disorder  chronic right PTX  TIA/CVA - with left sided weakness   NHL (non-Hodgkin's lymphoma): Age 43 sp chemo/rt/stem cell  Transient cerebral ischemia, unspecified type  Mitral prolapse  History of tonsillectomy  History of appendectomy    SOCIAL HISTORY:  non smoker no drugs                                    Admitted from:  [facility]     Surrogate -  Marialuisa Johnston [phone] and daughter Eva      FAMILY HISTORY:  Family history of stroke  Family history of breast cancer: Mother    Baseline ADLs (prior to admission):  Dependent      Allergies    IV Contrast (Anaphylaxis)  shellfish. (Anaphylaxis)    Intolerances    lorazepam (Other (Severe))    Present Symptoms:     Dyspnea: 0   Nausea/Vomiting: No  Anxiety:  No  Depression: unable   Fatigue: Yes   Loss of appetite: unable     Pain: none             Character-            Duration-            Effect-            Factors-            Frequency-            Location-            Severity-    Review of Systems: Reviewed              Unable to obtain due to poor mentation   All others negative    MEDICATIONS  (STANDING):  ascorbic acid 500 milliGRAM(s) Oral daily  atovaquone Suspension 1500 milliGRAM(s) Oral daily  chlorhexidine 0.12% Liquid 15 milliLiter(s) Oral Mucosa every 12 hours  collagenase Ointment 1 Application(s) Topical daily  enoxaparin Injectable 40 milliGRAM(s) SubCutaneous daily  famotidine    Tablet 20 milliGRAM(s) Oral daily  fentaNYL   Patch  12 MICROgram(s)/Hr 1 Patch Transdermal every 72 hours  ferrous    sulfate 325 milliGRAM(s) Oral two times a day  multivitamin 1 Tablet(s) Oral daily  OXcarbazepine 600 milliGRAM(s) Oral two times a day  polyethylene glycol 3350 17 Gram(s) Oral at bedtime  predniSONE   Tablet 20 milliGRAM(s) Oral daily  propranolol 20 milliGRAM(s) Oral every 6 hours  senna Syrup 10 milliLiter(s) Oral at bedtime  sucralfate suspension 1 Gram(s) Oral every 6 hours    MEDICATIONS  (PRN):  acetaminophen    Suspension .. 650 milliGRAM(s) Oral every 6 hours PRN Temp greater or equal to 38C (100.4F), Mild Pain (1 - 3)  ALBUTerol   0.042% 1.25 milliGRAM(s) Nebulizer four times a day PRN Shortness of Breath and/or Wheezing  aluminum hydroxide/magnesium hydroxide/simethicone Suspension 30 milliLiter(s) Oral every 4 hours PRN Dyspepsia  melatonin 5 milliGRAM(s) Oral at bedtime PRN Insomnia  simethicone drops 40 milliGRAM(s) Oral every 6 hours PRN Gas    PHYSICAL EXAM:    Vital Signs Last 24 Hrs  T(C): 37.3 (18 Mar 2020 12:00), Max: 38.1 (18 Mar 2020 00:00)  T(F): 99.1 (18 Mar 2020 12:00), Max: 100.6 (18 Mar 2020 00:00)  HR: 86 (18 Mar 2020 14:00) (85 - 103)  BP: 92/53 (18 Mar 2020 14:00) (85/54 - 111/64)  BP(mean): 67 (18 Mar 2020 14:00) (64 - 83)  RR: 40 (18 Mar 2020 14:00) (33 - 44)  SpO2: 93% (18 Mar 2020 14:00) (93% - 100%)    General: unresponsive; cachexia     Karnofsky:  20 %    HEENT: trach     Lungs: labored breathing. connected to ventilator via tracheostomy     CV: normal      GI: PEG     : normal     MSK: bedbound/wheelchair bound    Skin: no rash    LABS:                      7.8    15.56 )-----------( 721      ( 18 Mar 2020 05:07 )             27.4     03-18    137  |  84<L>  |  30.0<H>  ----------------------------<  130<H>  4.4   |  48.0<HH>  |  0.28<L>    Ca    9.2      18 Mar 2020 05:07    I&O's Summary    17 Mar 2020 07:01  -  18 Mar 2020 07:00  --------------------------------------------------------  IN: 1680 mL / OUT: 1135 mL / NET: 545 mL    18 Mar 2020 07:01  -  18 Mar 2020 14:39  --------------------------------------------------------  IN: 480 mL / OUT: 345 mL / NET: 135 mL    RADIOLOGY & ADDITIONAL STUDIES:    < from: CT Chest No Cont (01.23.20 @ 16:55) >    Small biapical pneumothoraces, unchanged from prior chest x-ray of 1/22/2020. Compared to the prior CT of 120, the right pneumothorax is smaller than the left pneumothorax is new.    Mild left hydronephrosis has developed. Clinically indicated, recommend CT abdomen and pelvis to determine the etiology.    Chronic interstitial lung disease. Bibasilar consolidation slightly improved from 1/8/2020. Mild bronchiectasis.    < end of copied text >    < from: Xray Chest 1 View- PORTABLE-Routine (03.17.20 @ 12:44) >  IMPRESSION:   No significant change..    < end of copied text >    ADVANCE DIRECTIVES: full code

## 2020-03-18 NOTE — PROGRESS NOTE ADULT - SUBJECTIVE AND OBJECTIVE BOX
NEPHROLOGY INTERVAL HPI/OVERNIGHT EVENTS:    Interim noted   ABG noted   Palliative Care follow up noted   UO 1.3 L +    MEDICATIONS  (STANDING):  ascorbic acid 500 milliGRAM(s) Oral daily  atovaquone Suspension 1500 milliGRAM(s) Oral daily  chlorhexidine 0.12% Liquid 15 milliLiter(s) Oral Mucosa every 12 hours  collagenase Ointment 1 Application(s) Topical daily  enoxaparin Injectable 40 milliGRAM(s) SubCutaneous daily  famotidine    Tablet 20 milliGRAM(s) Oral daily  ferrous    sulfate 325 milliGRAM(s) Oral two times a day  multivitamin 1 Tablet(s) Oral daily  OXcarbazepine 600 milliGRAM(s) Oral two times a day  polyethylene glycol 3350 17 Gram(s) Oral at bedtime  predniSONE   Tablet 20 milliGRAM(s) Oral daily  propranolol 20 milliGRAM(s) Oral every 6 hours  senna Syrup 10 milliLiter(s) Oral at bedtime  sucralfate suspension 1 Gram(s) Oral every 6 hours    MEDICATIONS  (PRN):  acetaminophen    Suspension .. 650 milliGRAM(s) Oral every 6 hours PRN Temp greater or equal to 38C (100.4F), Mild Pain (1 - 3)  ALBUTerol   0.042% 1.25 milliGRAM(s) Nebulizer four times a day PRN Shortness of Breath and/or Wheezing  aluminum hydroxide/magnesium hydroxide/simethicone Suspension 30 milliLiter(s) Oral every 4 hours PRN Dyspepsia  melatonin 5 milliGRAM(s) Oral at bedtime PRN Insomnia  simethicone drops 40 milliGRAM(s) Oral every 6 hours PRN Gas      Allergies    IV Contrast (Anaphylaxis)  shellfish. (Anaphylaxis)    Intolerances    lorazepam (Other (Severe))          Vital Signs Last 24 Hrs  T(C): 37.3 (18 Mar 2020 12:00), Max: 38.1 (18 Mar 2020 00:00)  T(F): 99.1 (18 Mar 2020 12:00), Max: 100.6 (18 Mar 2020 00:00)  HR: 86 (18 Mar 2020 14:00) (85 - 103)  BP: 92/53 (18 Mar 2020 14:00) (85/54 - 111/64)  BP(mean): 67 (18 Mar 2020 14:00) (64 - 83)  RR: 40 (18 Mar 2020 14:00) (33 - 44)  SpO2: 93% (18 Mar 2020 14:00) (93% - 100%)  Daily     Daily Weight in k.3 (18 Mar 2020 04:01)  I&O's Detail    17 Mar 2020 07:  -  18 Mar 2020 07:00  --------------------------------------------------------  IN:    Enteral Tube Flush: 240 mL    Jevity: 1440 mL  Total IN: 1680 mL    OUT:    Indwelling Catheter - Urethral: 1135 mL  Total OUT: 1135 mL    Total NET: 545 mL      18 Mar 2020 07:  -  18 Mar 2020 15:45  --------------------------------------------------------  IN:    Enteral Tube Flush: 60 mL    Jevity: 420 mL  Total IN: 480 mL    OUT:    Indwelling Catheter - Urethral: 345 mL  Total OUT: 345 mL    Total NET: 135 mL        I&O's Summary    17 Mar 2020 07:  -  18 Mar 2020 07:00  --------------------------------------------------------  IN: 1680 mL / OUT: 1135 mL / NET: 545 mL    18 Mar 2020 07:  -  18 Mar 2020 15:45  --------------------------------------------------------  IN: 480 mL / OUT: 345 mL / NET: 135 mL        PHYSIGENERAL: appears chronically ill  HEAD:  Trach  EYES: EOMI  NECK: Supple, neck  veins full  CHEST/LUNG: dec BS B/L  HEART: Regular rate and rhythm  ABDOMEN: Soft, Nontender, Nondistended; Bowel sounds present  EXTREMITIES:  No edema      LABS:                        7.8    15.56 )-----------( 721      ( 18 Mar 2020 05:07 )             27.4     03-18    137  |  84<L>  |  30.0<H>  ----------------------------<  130<H>  4.4   |  48.0<HH>  |  0.28<L>    Ca    9.2      18 Mar 2020 05:07            ABG - ( 18 Mar 2020 06:03 )  pH, Arterial: 7.50  pH, Blood: x     /  pCO2: 68    /  pO2: 93    / HCO3: 53    / Base Excess: 27.1  /  SaO2: 100                   RADIOLOGY & ADDITIONAL TESTS:

## 2020-03-18 NOTE — CONSULT NOTE ADULT - ATTENDING COMMENTS
Thank you for the opportunity to assist with the care of this patient.   [facility] [phone].
time spent reviewing labs, notes, orders, radiographs

## 2020-03-18 NOTE — PROGRESS NOTE ADULT - SUBJECTIVE AND OBJECTIVE BOX
PULMONARY PROGRESS NOTE      ROMIE VIRAMONTES  MRN-[id number]    Patient is a 60y old  Female who presents with a chief complaint of Acute respiratory distress (17 Mar 2020 13:18)      INTERVAL HPI/OVERNIGHT EVENTS:    Patient is s/p trach on vent  Tachypneic and unresponsive  Remains on PCV  Oxygenation is adequate  Tm 38.1    MEDICATIONS  (STANDING):  ascorbic acid 500 milliGRAM(s) Oral daily  atovaquone Suspension 1500 milliGRAM(s) Oral daily  chlorhexidine 0.12% Liquid 15 milliLiter(s) Oral Mucosa every 12 hours  collagenase Ointment 1 Application(s) Topical daily  enoxaparin Injectable 40 milliGRAM(s) SubCutaneous daily  famotidine    Tablet 20 milliGRAM(s) Oral daily  fentaNYL   Patch  12 MICROgram(s)/Hr 1 Patch Transdermal every 72 hours  ferrous    sulfate 325 milliGRAM(s) Oral two times a day  multivitamin 1 Tablet(s) Oral daily  OXcarbazepine 600 milliGRAM(s) Oral two times a day  polyethylene glycol 3350 17 Gram(s) Oral at bedtime  predniSONE   Tablet 20 milliGRAM(s) Oral daily  propranolol 20 milliGRAM(s) Oral every 6 hours  senna Syrup 10 milliLiter(s) Oral at bedtime  sucralfate suspension 1 Gram(s) Oral every 6 hours      MEDICATIONS  (PRN):  acetaminophen    Suspension .. 650 milliGRAM(s) Oral every 6 hours PRN Temp greater or equal to 38C (100.4F), Mild Pain (1 - 3)  ALBUTerol   0.042% 1.25 milliGRAM(s) Nebulizer four times a day PRN Shortness of Breath and/or Wheezing  aluminum hydroxide/magnesium hydroxide/simethicone Suspension 30 milliLiter(s) Oral every 4 hours PRN Dyspepsia  melatonin 5 milliGRAM(s) Oral at bedtime PRN Insomnia  simethicone drops 40 milliGRAM(s) Oral every 6 hours PRN Gas      Allergies    IV Contrast (Anaphylaxis)  shellfish. (Anaphylaxis)    Intolerances    lorazepam (Other (Severe))      PAST MEDICAL & SURGICAL HISTORY:  Interstitial lung disease: on home o2 prn  NHL (non-Hodgkin's lymphoma): Agem 43 sp chemo/rt/stem cell  Transient cerebral ischemia, unspecified type  Mitral prolapse  History of tonsillectomy  History of appendectomy        REVIEW OF SYSTEMS: pt unable to provide    Vital Signs Last 24 Hrs  T(C): 37.3 (18 Mar 2020 08:00), Max: 38.1 (18 Mar 2020 00:00)  T(F): 99.1 (18 Mar 2020 08:00), Max: 100.6 (18 Mar 2020 00:00)  HR: 93 (18 Mar 2020 10:00) (87 - 103)  BP: 90/55 (18 Mar 2020 10:00) (85/54 - 111/64)  BP(mean): 68 (18 Mar 2020 10:00) (64 - 83)  RR: 42 (18 Mar 2020 10:00) (33 - 44)  SpO2: 96% (18 Mar 2020 10:00) (89% - 100%)    PHYSICAL EXAMINATION:    GENERAL: The patient s/p trach on vent; unresponsive.     HEENT: Head is normocephalic and atraumatic.    NECK: + trach    LUNGS: Mild b/l rhonchi but no wheeze; pt tachypneic    HEART: Regular rate and rhythm without murmur.    ABDOMEN: Soft, nontender, and nondistended.      EXTREMITIES: Without any cyanosis, clubbing, rash, lesions or edema.    NEUROLOGIC: Grossly intact.    LABS:                        7.8    15.56 )-----------( 721      ( 18 Mar 2020 05:07 )             27.4     03-18    137  |  84<L>  |  30.0<H>  ----------------------------<  130<H>  4.4   |  48.0<HH>  |  0.28<L>    Ca    9.2      18 Mar 2020 05:07          ABG - ( 18 Mar 2020 06:03 )  pH, Arterial: 7.50  pH, Blood: x     /  pCO2: 68    /  pO2: 93    / HCO3: 53    / Base Excess: 27.1  /  SaO2: 100         MICROBIOLOGY:    Rapid Respiratory Viral Panel (03.10.20 @ 23:13)    Rapid RVP Result: NotDetec: This Respiratory Panel does NOT detect the 2019 novel coronavirus  (2019-nCoV) involved with the outbreak originating in [facility].  This Respiratory Panel uses polymerase chain reaction (PCR) to detect for  adenovirus; coronavirus (HKU1, NL63, 229E, OC43); human metapneumovirus  (hMPV); human enterovirus/rhinovirus (Entero/RV); influenza A; influenza  A/H1; influenza A/H3; influenza A/H1-2009; influenza B; parainfluenza  viruses 1, 2, 3, 4; respiratory syncytial virus; Mycoplasma pneumoniae;  and Chlamydophila pneumoniae.        Culture - Sputum . (03.12.20 @ 16:28)    Gram Stain:   Numerous polymorphonuclear leukocytes per low power field  No Squamous epithelial cells per low power field  No organisms seen per oil power field    Specimen Source: .Sputum    Culture Results:   Normal Respiratory Meme present    RADIOLOGY & ADDITIONAL STUDIES:       EXAM:  XR CHEST PORTABLE ROUTINE 1V                          PROCEDURE DATE:  03/17/2020          INTERPRETATION:  Portable chest radiograph        CLINICAL INFORMATION:   Short of breath.    TECHNIQUE:  Portable  AP view of the chest was obtained.    COMPARISON: 2/13/2020 available for review.    FINDINGS:   There is diffuse idiopathic pulmonary fibrosis with them peripheral pleural thickening and sagittal vibe of peripheral fibrosis and areas stable RIGHT apical lung pneumothorax, 5-10% of RIGHT hemithorax volume. I there is cardiomegaly as seen previously. Tracheostomy tube in place. Superimposed infectious infiltrate cannot be excluded.  Visualized osseous structures are intact.        IMPRESSION:   No significant change..                    KAMAR JIMENEZ M.D., ATTENDING RADIOLOGIST  This document has been electronically signed. Mar 17 2020  1:17PM

## 2020-03-19 LAB
ANION GAP SERPL CALC-SCNC: 7 MMOL/L — SIGNIFICANT CHANGE UP (ref 5–17)
APPEARANCE UR: ABNORMAL
BACTERIA # UR AUTO: ABNORMAL
BASE EXCESS BLDA CALC-SCNC: 23 MMOL/L — HIGH (ref -3–3)
BILIRUB UR-MCNC: NEGATIVE — SIGNIFICANT CHANGE UP
BLD GP AB SCN SERPL QL: SIGNIFICANT CHANGE UP
BLOOD GAS COMMENTS ARTERIAL: SIGNIFICANT CHANGE UP
BUN SERPL-MCNC: 32 MG/DL — HIGH (ref 8–20)
CALCIUM SERPL-MCNC: 9.7 MG/DL — SIGNIFICANT CHANGE UP (ref 8.6–10.2)
CHLORIDE SERPL-SCNC: 84 MMOL/L — LOW (ref 98–107)
CO2 SERPL-SCNC: 45 MMOL/L — CRITICAL HIGH (ref 22–29)
COLOR SPEC: YELLOW — SIGNIFICANT CHANGE UP
CREAT SERPL-MCNC: 0.28 MG/DL — LOW (ref 0.5–1.3)
DIFF PNL FLD: ABNORMAL
EPI CELLS # UR: SIGNIFICANT CHANGE UP
GAS PNL BLDA: SIGNIFICANT CHANGE UP
GLUCOSE SERPL-MCNC: 136 MG/DL — HIGH (ref 70–99)
GLUCOSE UR QL: NEGATIVE MG/DL — SIGNIFICANT CHANGE UP
HCO3 BLDA-SCNC: 48 MMOL/L — HIGH (ref 20–26)
HCT VFR BLD CALC: 28.7 % — LOW (ref 34.5–45)
HGB BLD-MCNC: 8 G/DL — LOW (ref 11.5–15.5)
HOROWITZ INDEX BLDA+IHG-RTO: 32 — SIGNIFICANT CHANGE UP
KETONES UR-MCNC: NEGATIVE — SIGNIFICANT CHANGE UP
LEUKOCYTE ESTERASE UR-ACNC: ABNORMAL
MAGNESIUM SERPL-MCNC: 2.2 MG/DL — SIGNIFICANT CHANGE UP (ref 1.6–2.6)
MCHC RBC-ENTMCNC: 26.5 PG — LOW (ref 27–34)
MCHC RBC-ENTMCNC: 27.9 GM/DL — LOW (ref 32–36)
MCV RBC AUTO: 95 FL — SIGNIFICANT CHANGE UP (ref 80–100)
NITRITE UR-MCNC: NEGATIVE — SIGNIFICANT CHANGE UP
OSMOLALITY UR: 690 MOSM/KG — SIGNIFICANT CHANGE UP (ref 300–1000)
PCO2 BLDA: 94 MMHG — CRITICAL HIGH (ref 35–45)
PH BLDA: 7.35 — SIGNIFICANT CHANGE UP (ref 7.35–7.45)
PH UR: 6 — SIGNIFICANT CHANGE UP (ref 5–8)
PLATELET # BLD AUTO: 705 K/UL — HIGH (ref 150–400)
PO2 BLDA: 72 MMHG — LOW (ref 83–108)
POTASSIUM SERPL-MCNC: 5.1 MMOL/L — SIGNIFICANT CHANGE UP (ref 3.5–5.3)
POTASSIUM SERPL-SCNC: 5.1 MMOL/L — SIGNIFICANT CHANGE UP (ref 3.5–5.3)
PROT UR-MCNC: 30 MG/DL
RBC # BLD: 3.02 M/UL — LOW (ref 3.8–5.2)
RBC # FLD: 17.9 % — HIGH (ref 10.3–14.5)
RBC CASTS # UR COMP ASSIST: ABNORMAL /HPF (ref 0–4)
SAO2 % BLDA: 95 % — SIGNIFICANT CHANGE UP (ref 95–99)
SODIUM SERPL-SCNC: 136 MMOL/L — SIGNIFICANT CHANGE UP (ref 135–145)
SODIUM UR-SCNC: <30 MMOL/L — SIGNIFICANT CHANGE UP
SP GR SPEC: 1.02 — SIGNIFICANT CHANGE UP (ref 1.01–1.02)
UROBILINOGEN FLD QL: NEGATIVE MG/DL — SIGNIFICANT CHANGE UP
WBC # BLD: 21.09 K/UL — HIGH (ref 3.8–10.5)
WBC # FLD AUTO: 21.09 K/UL — HIGH (ref 3.8–10.5)
WBC UR QL: >50

## 2020-03-19 PROCEDURE — 99358 PROLONG SERVICE W/O CONTACT: CPT

## 2020-03-19 PROCEDURE — 71045 X-RAY EXAM CHEST 1 VIEW: CPT | Mod: 26

## 2020-03-19 PROCEDURE — 99233 SBSQ HOSP IP/OBS HIGH 50: CPT

## 2020-03-19 PROCEDURE — 99291 CRITICAL CARE FIRST HOUR: CPT

## 2020-03-19 RX ORDER — SODIUM CHLORIDE 9 MG/ML
300 INJECTION INTRAMUSCULAR; INTRAVENOUS; SUBCUTANEOUS ONCE
Refills: 0 | Status: COMPLETED | OUTPATIENT
Start: 2020-03-19 | End: 2020-03-19

## 2020-03-19 RX ORDER — AZITHROMYCIN 500 MG/1
500 TABLET, FILM COATED ORAL EVERY 24 HOURS
Refills: 0 | Status: DISCONTINUED | OUTPATIENT
Start: 2020-03-19 | End: 2020-03-21

## 2020-03-19 RX ORDER — VANCOMYCIN HCL 1 G
1000 VIAL (EA) INTRAVENOUS ONCE
Refills: 0 | Status: COMPLETED | OUTPATIENT
Start: 2020-03-19 | End: 2020-03-19

## 2020-03-19 RX ORDER — PIPERACILLIN AND TAZOBACTAM 4; .5 G/20ML; G/20ML
3.38 INJECTION, POWDER, LYOPHILIZED, FOR SOLUTION INTRAVENOUS ONCE
Refills: 0 | Status: COMPLETED | OUTPATIENT
Start: 2020-03-19 | End: 2020-03-19

## 2020-03-19 RX ORDER — PIPERACILLIN AND TAZOBACTAM 4; .5 G/20ML; G/20ML
3.38 INJECTION, POWDER, LYOPHILIZED, FOR SOLUTION INTRAVENOUS EVERY 8 HOURS
Refills: 0 | Status: DISCONTINUED | OUTPATIENT
Start: 2020-03-19 | End: 2020-03-21

## 2020-03-19 RX ADMIN — PIPERACILLIN AND TAZOBACTAM 25 GRAM(S): 4; .5 INJECTION, POWDER, LYOPHILIZED, FOR SOLUTION INTRAVENOUS at 21:02

## 2020-03-19 RX ADMIN — SODIUM CHLORIDE 83 MILLILITER(S): 9 INJECTION INTRAMUSCULAR; INTRAVENOUS; SUBCUTANEOUS at 05:01

## 2020-03-19 RX ADMIN — Medication 1 GRAM(S): at 18:20

## 2020-03-19 RX ADMIN — Medication 20 MILLIGRAM(S): at 05:00

## 2020-03-19 RX ADMIN — Medication 1 APPLICATION(S): at 13:59

## 2020-03-19 RX ADMIN — Medication 1 GRAM(S): at 23:06

## 2020-03-19 RX ADMIN — ATOVAQUONE 1500 MILLIGRAM(S): 750 SUSPENSION ORAL at 13:59

## 2020-03-19 RX ADMIN — Medication 325 MILLIGRAM(S): at 05:00

## 2020-03-19 RX ADMIN — Medication 1 GRAM(S): at 05:00

## 2020-03-19 RX ADMIN — Medication 500 MILLIGRAM(S): at 13:59

## 2020-03-19 RX ADMIN — OXCARBAZEPINE 600 MILLIGRAM(S): 300 TABLET, FILM COATED ORAL at 18:20

## 2020-03-19 RX ADMIN — CHLORHEXIDINE GLUCONATE 15 MILLILITER(S): 213 SOLUTION TOPICAL at 14:00

## 2020-03-19 RX ADMIN — Medication 1 GRAM(S): at 14:02

## 2020-03-19 RX ADMIN — SODIUM CHLORIDE 300 MILLILITER(S): 9 INJECTION INTRAMUSCULAR; INTRAVENOUS; SUBCUTANEOUS at 19:12

## 2020-03-19 RX ADMIN — OXCARBAZEPINE 600 MILLIGRAM(S): 300 TABLET, FILM COATED ORAL at 05:05

## 2020-03-19 RX ADMIN — SODIUM CHLORIDE 83 MILLILITER(S): 9 INJECTION INTRAMUSCULAR; INTRAVENOUS; SUBCUTANEOUS at 19:12

## 2020-03-19 RX ADMIN — PIPERACILLIN AND TAZOBACTAM 200 GRAM(S): 4; .5 INJECTION, POWDER, LYOPHILIZED, FOR SOLUTION INTRAVENOUS at 14:01

## 2020-03-19 RX ADMIN — Medication 650 MILLIGRAM(S): at 05:00

## 2020-03-19 RX ADMIN — AZITHROMYCIN 255 MILLIGRAM(S): 500 TABLET, FILM COATED ORAL at 18:20

## 2020-03-19 RX ADMIN — Medication 1 TABLET(S): at 14:00

## 2020-03-19 RX ADMIN — Medication 250 MILLIGRAM(S): at 14:02

## 2020-03-19 RX ADMIN — Medication 40 MILLIGRAM(S): at 21:02

## 2020-03-19 RX ADMIN — FAMOTIDINE 20 MILLIGRAM(S): 10 INJECTION INTRAVENOUS at 13:59

## 2020-03-19 RX ADMIN — Medication 40 MILLIGRAM(S): at 18:19

## 2020-03-19 RX ADMIN — Medication 325 MILLIGRAM(S): at 14:00

## 2020-03-19 RX ADMIN — CHLORHEXIDINE GLUCONATE 15 MILLILITER(S): 213 SOLUTION TOPICAL at 05:00

## 2020-03-19 RX ADMIN — Medication 650 MILLIGRAM(S): at 19:06

## 2020-03-19 RX ADMIN — ENOXAPARIN SODIUM 40 MILLIGRAM(S): 100 INJECTION SUBCUTANEOUS at 13:59

## 2020-03-19 RX ADMIN — Medication 650 MILLIGRAM(S): at 20:06

## 2020-03-19 NOTE — PROGRESS NOTE ADULT - SUBJECTIVE AND OBJECTIVE BOX
NEPHROLOGY INTERVAL HPI/OVERNIGHT EVENTS:    No new events.    MEDICATIONS  (STANDING):  ascorbic acid 500 milliGRAM(s) Oral daily  atovaquone Suspension 1500 milliGRAM(s) Oral daily  chlorhexidine 0.12% Liquid 15 milliLiter(s) Oral Mucosa every 12 hours  collagenase Ointment 1 Application(s) Topical daily  enoxaparin Injectable 40 milliGRAM(s) SubCutaneous daily  famotidine    Tablet 20 milliGRAM(s) Oral daily  ferrous    sulfate 325 milliGRAM(s) Oral two times a day  multivitamin 1 Tablet(s) Oral daily  OXcarbazepine 600 milliGRAM(s) Oral two times a day  polyethylene glycol 3350 17 Gram(s) Oral at bedtime  predniSONE   Tablet 20 milliGRAM(s) Oral daily  propranolol 20 milliGRAM(s) Oral every 6 hours  senna Syrup 10 milliLiter(s) Oral at bedtime  sucralfate suspension 1 Gram(s) Oral every 6 hours    MEDICATIONS  (PRN):  acetaminophen    Suspension .. 650 milliGRAM(s) Oral every 6 hours PRN Temp greater or equal to 38C (100.4F), Mild Pain (1 - 3)  ALBUTerol   0.042% 1.25 milliGRAM(s) Nebulizer four times a day PRN Shortness of Breath and/or Wheezing  aluminum hydroxide/magnesium hydroxide/simethicone Suspension 30 milliLiter(s) Oral every 4 hours PRN Dyspepsia  melatonin 5 milliGRAM(s) Oral at bedtime PRN Insomnia  simethicone drops 40 milliGRAM(s) Oral every 6 hours PRN Gas      Allergies    IV Contrast (Anaphylaxis)  shellfish. (Anaphylaxis)    Intolerances    lorazepam (Other (Severe))          Vital Signs Last 24 HrsICU   T(C): 36.9 (19 Mar 2020 12:00), Max: 37.7 (19 Mar 2020 04:00)  T(F): 98.4 (19 Mar 2020 12:00), Max: 99.9 (19 Mar 2020 04:00)  HR: 104 (19 Mar 2020 12:43) (86 - 104)  BP: 87/52 (19 Mar 2020 12:00) (80/49 - 111/63)  BP(mean): 63 (19 Mar 2020 12:00) (58 - 80)  ABP: --  ABP(mean): --  RR: 38 (19 Mar 2020 12:00) (36 - 46)  SpO2: 94% (19 Mar 2020 12:43) (89% - 96%)              PE:   Appears chronically ill  NECK: Trach  EYES: Same  NECK: Supple, neck  veins full  CHEST/LUNG: No wheezes  HEART: Regular rate and rhythm  ABDOMEN: Soft, Nontender, Nondistended; Bowel sounds present; tube feeding  EXTREMITIES: Diffuse muscle wasting  : Villatoro    LABS:  03-19    136  |  84<L>  |  32.0<H>  ----------------------------<  136<H>  5.1   |  45.0<HH>  |  0.28<L>    Ca    9.7      19 Mar 2020 05:14  Mg     2.2     03-19                            7.8    15.56 )-----------( 721      ( 18 Mar 2020 05:07 )             27.4     03-18    137  |  84<L>  |  30.0<H>  ----------------------------<  130<H>  4.4   |  48.0<HH>  |  0.28<L>    Ca    9.2      18 Mar 2020 05:07            ABG - ( 18 Mar 2020 06:03 )  pH, Arterial: 7.50  pH, Blood: x     /  pCO2: 68    /  pO2: 93    / HCO3: 53    / Base Excess: 27.1  /  SaO2: 100                   RADIOLOGY & ADDITIONAL TESTS:

## 2020-03-19 NOTE — PROGRESS NOTE ADULT - SUBJECTIVE AND OBJECTIVE BOX
PULMONARY PROGRESS NOTE      ROMIE VIRAMONTES  MRN-[id number]    Patient is a 60y old  Female who presents with a chief complaint of Acute respiratory distress (18 Mar 2020 15:45)      INTERVAL HPI/OVERNIGHT EVENTS:    Pt is unresponsive s/p trach on vent    MEDICATIONS  (STANDING):  ascorbic acid 500 milliGRAM(s) Oral daily  atovaquone Suspension 1500 milliGRAM(s) Oral daily  chlorhexidine 0.12% Liquid 15 milliLiter(s) Oral Mucosa every 12 hours  collagenase Ointment 1 Application(s) Topical daily  enoxaparin Injectable 40 milliGRAM(s) SubCutaneous daily  famotidine    Tablet 20 milliGRAM(s) Oral daily  ferrous    sulfate 325 milliGRAM(s) Oral two times a day  multivitamin 1 Tablet(s) Oral daily  OXcarbazepine 600 milliGRAM(s) Oral two times a day  polyethylene glycol 3350 17 Gram(s) Oral at bedtime  predniSONE   Tablet 20 milliGRAM(s) Oral daily  propranolol 20 milliGRAM(s) Oral every 6 hours  senna Syrup 10 milliLiter(s) Oral at bedtime  sodium chloride 0.9% Bolus 300 milliLiter(s) IV Bolus once  sodium chloride 0.9%. 1000 milliLiter(s) (83 mL/Hr) IV Continuous <Continuous>  sucralfate suspension 1 Gram(s) Oral every 6 hours      MEDICATIONS  (PRN):  acetaminophen    Suspension .. 650 milliGRAM(s) Oral every 6 hours PRN Temp greater or equal to 38C (100.4F), Mild Pain (1 - 3)  ALBUTerol   0.042% 1.25 milliGRAM(s) Nebulizer four times a day PRN Shortness of Breath and/or Wheezing  aluminum hydroxide/magnesium hydroxide/simethicone Suspension 30 milliLiter(s) Oral every 4 hours PRN Dyspepsia  melatonin 5 milliGRAM(s) Oral at bedtime PRN Insomnia  simethicone drops 40 milliGRAM(s) Oral every 6 hours PRN Gas      Allergies    IV Contrast (Anaphylaxis)  shellfish. (Anaphylaxis)    Intolerances    lorazepam (Other (Severe))      PAST MEDICAL & SURGICAL HISTORY:  Interstitial lung disease: on home o2 prn  NHL (non-Hodgkin's lymphoma): Agem 43 sp chemo/rt/stem cell  Transient cerebral ischemia, unspecified type  Mitral prolapse  History of tonsillectomy  History of appendectomy        REVIEW OF SYSTEMS: Pt unable to provide    Vital Signs Last 24 Hrs  T(C): 37.3 (19 Mar 2020 07:00), Max: 37.7 (19 Mar 2020 04:00)  T(F): 99.1 (19 Mar 2020 07:00), Max: 99.9 (19 Mar 2020 04:00)  HR: 95 (19 Mar 2020 09:00) (85 - 99)  BP: 80/49 (19 Mar 2020 09:00) (80/49 - 111/63)  BP(mean): 60 (19 Mar 2020 09:00) (58 - 80)  RR: 37 (19 Mar 2020 09:00) (36 - 46)  SpO2: 92% (19 Mar 2020 09:00) (89% - 96%)    PHYSICAL EXAMINATION:    GENERAL: The patient is s/p trach in no apparent distress.     HEENT: Head is normocephalic and atraumatic.     NECK: + trach.    LUNGS: Mild rhonchi/no wheeze; respirations unlabored    HEART: Regular rate and rhythm without murmur.    ABDOMEN: Soft, nontender, and nondistended.      EXTREMITIES: Without any cyanosis, clubbing, rash, lesions or edema.    NEUROLOGIC: Grossly intact.    LABS:                        7.8    15.56 )-----------( 721      ( 18 Mar 2020 05:07 )             27.4     03-19    136  |  84<L>  |  32.0<H>  ----------------------------<  136<H>  5.1   |  45.0<HH>  |  0.28<L>    Ca    9.7      19 Mar 2020 05:14  Mg     2.2     03-19          ABG - ( 18 Mar 2020 06:03 )  pH, Arterial: 7.50  pH, Blood: x     /  pCO2: 68    /  pO2: 93    / HCO3: 53    / Base Excess: 27.1  /  SaO2: 100         MICROBIOLOGY:    Rapid Respiratory Viral Panel (03.10.20 @ 23:13)    Rapid RVP Result: NotDetec: This Respiratory Panel does NOT detect the 2019 novel coronavirus  (2019-nCoV) involved with the outbreak originating in [facility].  This Respiratory Panel uses polymerase chain reaction (PCR) to detect for  adenovirus; coronavirus (HKU1, NL63, 229E, OC43); human metapneumovirus  (hMPV); human enterovirus/rhinovirus (Entero/RV); influenza A; influenza  A/H1; influenza A/H3; influenza A/H1-2009; influenza B; parainfluenza  viruses 1, 2, 3, 4; respiratory syncytial virus; Mycoplasma pneumoniae;  and Chlamydophila pneumoniae.        RADIOLOGY & ADDITIONAL STUDIES:     EXAM:  XR CHEST PORTABLE ROUTINE 1V                          PROCEDURE DATE:  03/17/2020          INTERPRETATION:  Portable chest radiograph        CLINICAL INFORMATION:   Short of breath.    TECHNIQUE:  Portable  AP view of the chest was obtained.    COMPARISON: 2/13/2020 available for review.    FINDINGS:   There is diffuse idiopathic pulmonary fibrosis with them peripheral pleural thickening and sagittal vibe of peripheral fibrosis and areas stable RIGHT apical lung pneumothorax, 5-10% of RIGHT hemithorax volume. I there is cardiomegaly as seen previously. Tracheostomy tube in place. Superimposed infectious infiltrate cannot be excluded.  Visualized osseous structures are intact.        IMPRESSION:   No significant change..          KAMAR JIMENEZ M.D., ATTENDING RADIOLOGIST  This document has been electronically signed. Mar 17 2020  1:17PM          ECHO 1/11/20:    CONCLUSIONS:    1. Normal left ventricular systolic function. The ejection fraction is approximately 65%.  2. Thickened trileaflet aortic valve with no significant aortic insufficiency.  3. Thickened mitral valve with mild posterior leaflet prolapse. Moderate mitral insufficiency.  4. Mild left atrial enlargement.  5. Normal right ventricular size and systolic function.  6. Grade 1 diastolic dysfunction.  7. Right ventricular systolic pressure equals 48 mmHg, assuming a right atrial pressure of 8 mmHg, consistent with mild pulmonary hypertension.                    MOOK RUBIO M.D., ATTENDING CARDIOLOGIST  This document has been electronically signed. Jan 11 2020  8:28AM

## 2020-03-19 NOTE — PROGRESS NOTE ADULT - ASSESSMENT
60F with severe ILD, respiratory failure, s/p PEA arrest s/p trach (1/29), recent RLE/RUE thrombus, stage IV NHL s/p chemo/RT and stem cell transplant ( 2003 @ [facility] ? remission), previous CVA with left sided weakness, admitted from [facility] with change in mental status and acute on chronic hypercapnic respiratory failure, now obtunded, with fever and increased work of breathing with grave prognosis.     #1 Acute on chronic respiratory failure - ventilator dependence. pulmonary following. on steroids. PCP PPX. ? HCAP ? repeat lung imaging?   #2 Fever - ? repeat cultures and/or chest imaging?   #3ILD - end stage. grave prognosis. ? acute on chronic exacerbation, discussed   #4 Encounter for palliative care - 60F with severe ILD, respiratory failure, s/p PEA arrest s/p trach (1/29), recent RLE/RUE thrombus, stage IV NHL s/p chemo/RT and stem cell transplant ( 2003 @ [facility] ? remission), previous CVA with left sided weakness, admitted from [facility] with change in mental status and acute on chronic hypercapnic respiratory failure, now obtunded, with fever and increased work of breathing with grave prognosis.     #1 Acute on chronic respiratory failure - ventilator dependence. pulmonary following. on steroids. PCP PPX. ? HCAP ? repeat lung imaging?   #2 Fever - ? repeat cultures and/or chest imaging?   #3 ILD - end stage. grave prognosis. ? acute on chronic exacerbation, discussed with pulmonary. Also contacted patient's primary ILD specialist Dr. Bettye Hazel at [place] -who has known her and family for years.   #4 Encounter for palliative care - Will call family later today. Spoke with Dr. Bettye Hazel who is patient's primary ILD specialist and knows her and family for years. She will also be in touch with family to help reiterate the poor prognosis here.

## 2020-03-19 NOTE — PROGRESS NOTE ADULT - ASSESSMENT
Chronic hypoxemic and hypercapnic respiratory failure though pH is now elevated  H/o CVA  Chronic R apical Ptx  ILD, NSIP like with peribronchial GG/ consolidation, traction bronchiectasis  currently stable on PC vent;  cc  Hx Upper extremity DVT related to catheter which has been removed, current duplex negative bilat UE  thus off  full AC  Anemic  Leukocytosis  Current ABG with compensated hypercarbia  Last CXR with stable changes  Echo with mild pulm HTN  ETCO elevated in 90s    Rec:    Continue vent support with chronic PTX  Follow HCT  Placed on prednisone 20 mg and atovaquone from [facility]  Follow wbc which is trending higher again but with improved fevers  Off abx per ID but consider re-evaluation given persistent leukocytosis and possible worsening bibasilar opacities  Last echocardiogram with normal LV but mild pulm HTN  Follow labs and ABG given worsening ETCO  Will need to find NH that take PC vent prior to any further adjustments when stable for tx  Prognosis remains extremely guarded  Needs palliative f/u, goals of care  Overall poor prognosis  Pt remains critically ill    d/w medicine Chronic hypoxemic and hypercapnic respiratory failure though pH is now elevated  H/o CVA  Chronic R apical Ptx  ILD, NSIP like with peribronchial GG/ consolidation, traction bronchiectasis  currently stable on PC vent;  cc  Hx Upper extremity DVT related to catheter which has been removed, current duplex negative bilat UE  thus off  full AC  Anemic  Leukocytosis  Current ABG with compensated hypercarbia  Last CXR with stable changes  Echo with mild pulm HTN  ETCO elevated in 90s    Rec:    Continue vent support with chronic PTX  Follow HCT  Placed on prednisone 20 mg and atovaquone from [facility]  Follow wbc which is trending higher again but with improved fevers  Off abx per ID but consider re-evaluation given persistent leukocytosis and possible worsening bibasilar opacities  Last echocardiogram with normal LV but mild pulm HTN  Follow labs and ABG given worsening ETCO  Will need to find NH that take PC vent prior to any further adjustments when stable for tx  Prognosis remains extremely guarded  Needs palliative f/u, goals of care  Overall poor prognosis  Pt remains chronically critically ill    d/w medicine Chronic hypoxemic and hypercapnic respiratory failure though pH is now elevated  H/o CVA  Chronic R apical Ptx  ILD, NSIP like with peribronchial GG/ consolidation, traction bronchiectasis  currently stable on PC vent;  cc  Hx Upper extremity DVT related to catheter which has been removed, current duplex negative bilat UE  thus off  full AC  Anemic  Leukocytosis  Current ABG with compensated hypercarbia  Last CXR with stable changes  Echo with mild pulm HTN  ETCO elevated in 90s    Rec:    Continue vent support with chronic PTX  Follow HCT  Placed on prednisone 20 mg and atovaquone from [facility]  Follow wbc which is trending higher again but with improved fevers  Off abx per ID but consider re-evaluation given persistent leukocytosis and possible worsening bibasilar opacities  Last echocardiogram with normal LV but mild pulm HTN  Follow labs and ABG given worsening ETCO  Will need to find NH that take PC vent prior to any further adjustments when stable for tx  Prognosis remains extremely guarded  Needs palliative f/u, goals of care  Overall poor prognosis  Pt remains chronically critically ill      Addendum:    Complete Blood Count (03.19.20 @ 10:35)    WBC Count: 21.09 K/uL    RBC Count: 3.02 M/uL    Hemoglobin: 8.0 g/dL    Hematocrit: 28.7 %    Mean Cell Volume: 95.0 fl    Mean Cell Hemoglobin: 26.5 pg    Mean Cell Hemoglobin Conc: 27.9 gm/dL    Red Cell Distrib Width: 17.9 %    Platelet Count - Automated: 705 K/uL    Blood Gas Profile - Arterial (03.19.20 @ 10:55)    pH, Arterial: 7.35    pCO2, Arterial: 94: TYPE:(C=Critical, N=Notification, A=Abnormal) c  TESTS: _co2  DATE/TIME CALLED: _03/19/20 10:58  CALLED TO: _dr callejas  READ BACK (2 Patient Identifiers)(Y/N): _y  READ BACK VALUES (Y/N): _y  CALLED BY: _lr mmHg    pO2, Arterial: 72 mmHg    HCO3, Arterial: 48 mmoL/L    Base Excess, Arterial: 23.0 mmol/L    Oxygen Saturation, Arterial: 95 %    FIO2, Arterial: 32    Blood Gas Comments Arterial: ac pc 28/pip40/peep 5/.32    Blood Gas Source Arterial: Arterial    Worsening leukocytosis  Compensated hypercarbia with normal pH  Consider ID re-evaluation   Check CXR  Increase steroids for underlying ILD      d/w medicine  d/w palliative care

## 2020-03-19 NOTE — PROGRESS NOTE ADULT - ASSESSMENT
Initial HPI:  This is a 58 y/o female with history of severe restrictive interstitial lung disease, s/p resp failure s/p trach 1/29, ventilator dependent, s/p PEA arrest (1/29), RLE DVT and RUE occlusive thrombus, toxic metabolic encephalopathy, severe sepsis secondary to serratia PNA, unspecified pleural/parenchymal bronchiectatic process with pulmonary nodules for which she has opposed steroid therapy (prior), spontaneous right-sided PTX (1/19), stage Peg NHL (s/p chemo/XRT and SCT received '03 at [facility] - in remission) 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 Patient was discharged to [facility], on the day of arrival to [facility], from [facility]. Upon arrival to [facility] pt was placed on volume control ventilation at which time she desaturated, became unresponsive and in acute respiratory distress.  Admitted for evaluation, seen by CT Surgery, Pulmonary and critical care.    #Metabolic encephalopathy - multifactorial.  Improving with improvement in hypercarbia.  Attempt CT head when respiratory status improves.  Holding Remeron, Oxycodone, Duragesic.      #Fever - Now hypotensive, WBC increased.  Check cultures.  Start on Zosyn, Levaquin.  ID evaluation.   #Anxiety - and tachycardic at times.  Hold Propranolol  #Acute on chronic respiratory failure; Small R Pneumothorax, stable; Severe ILD :   Poor prognosis d/w palliative care and pulmonary.  Despite adequate volumes on pressure control, still hypercarbic at times.  Poor prognosis.   Cont prednisone 20mg for ILD with Atovaquone for PCP prophylaxis.    #Acute on Chronic blood loss anemia - Stool OB was negative.  Monitor H/H,.  #Right Brachial Vein Thrombosis & Left Femoral DVT, present on arrival; due to line in that arm previously; since removed.  Repeat U/S showed no DVT in UE/ LEs.  Currently off anticoagulation due to bleed.  Given comorbid conditions and extensive conversation with family on 3/15, will observe off anticoagulation.    #Chronic Pain - Pt appears to be comfortable.  stopping Duragesic.  #Severe protein calorie Malnutrition - - cont TF: Nepro  #H/o Constipation, cont bowel regimen  #h/o Sacrum/coccyx pressure ulcer, present on admission - cont daily wound care, cont Santyl  # Chronic Anemia - H/H appears to be stable.  No s/s of bleeding.    # DVT Prophylaxis - Lovenox subcut

## 2020-03-19 NOTE — CONSULT NOTE ADULT - SUBJECTIVE AND OBJECTIVE BOX
[facility]  INFECTIOUS DISEASES AND INTERNAL MEDICINE at [place]  =======================================================  Chandrakant Dorantes MD FACP   Peng Honeycutt MD  Diplomates American Board of Internal Medicine and Infectious Diseases  =======================================================    [facility]-[id number]  ROMIE VIRAMONTES     CC: Patient is a 60y old  Female who presents with a chief complaint of Acute respiratory distress (19 Mar 2020 13:36)    HPI:  58 y/o woman with PMH of severe restrictive interstitial lung disease, s/p resp failure s/p trach 1/29, ventilator dependent, s/p PEA arrest (1/29), RLE DVT and RUE occlusive thrombus, toxic metabolic encephalopathy, severe sepsis secondary to serratia PNA, unspecified pleural/parenchymal bronchiectatic process with pulmonary nodules, spontaneous right-sided PTX (1/19), stage Peg NHL s/p chemo[place] [facility] [place], 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 on 3/11, the same day after being discharged from [facility] to [facility] with desaturation and unresponsiveness. She was admitted for work up, now in MICU, seen by CT Surgery, Pulmonary and critical care.  ID has been called for fever and hypotension, and possible sepsis. She has been started on zosyn.     PAST MEDICAL & SURGICAL HISTORY:  Interstitial lung disease: on home o2 prn  NHL (non-Hodgkin's lymphoma): Agem 45 sp chemo/rt/stem cell  Transient cerebral ischemia, unspecified type  Mitral prolapse  History of tonsillectomy  History of appendectomy    FAMILY HISTORY:  Family history of stroke  Family history of breast cancer: Mother    Allergies  IV Contrast (Anaphylaxis)  shellfish. (Anaphylaxis)    Antibiotics:  piperacillin/tazobactam IVPB.. 3.375 Gram(s) IV Intermittent every 8 hours     REVIEW OF SYSTEMS:  Intubated through trach, not answering     Physical Exam:  Vital Signs Last 24 Hrs  T(C): 36.9 (19 Mar 2020 12:00), Max: 37.7 (19 Mar 2020 04:00)  T(F): 98.4 (19 Mar 2020 12:00), Max: 99.9 (19 Mar 2020 04:00)  HR: 108 (19 Mar 2020 15:00) (89 - 108)  BP: 116/55 (19 Mar 2020 15:00) (80/49 - 116/55)  BP(mean): 78 (19 Mar 2020 15:00) (58 - 80)  RR: 42 (19 Mar 2020 15:00) (36 - 46)  SpO2: 93% (19 Mar 2020 15:00) (89% - 96%)  GEN: resting on bed, not responding   HEENT: normocephalic and atraumatic. EOMI. PERRL.    NECK: Supple.  No lymphadenopathy, trach in place on vent   LUNGS: bilateral rhonchi.   HEART: Regular rate and rhythm   ABDOMEN: Soft, nontender, and nondistended.  Positive bowel sounds.    : No CVA tenderness  EXTREMITIES: Without any cyanosis, clubbing, rash, lesions or edema.  NEUROLOGIC: grossly intact.  PSYCHIATRIC: Appropriate affect .  SKIN: No ulceration or induration present.    Labs:  03-19    136  |  84<L>  |  32.0<H>  ----------------------------<  136<H>  5.1   |  45.0<HH>  |  0.28<L>    Ca    9.7      19 Mar 2020 05:14  Mg     2.2     03-19                        8.0    21.09 )-----------( 705      ( 19 Mar 2020 10:35 )             28.7     ABG - ( 19 Mar 2020 10:55 )  pH, Arterial: 7.35  pH, Blood: x     /  pCO2: 94    /  pO2: 72    / HCO3: 48    / Base Excess: 23.0  /  SaO2: 95        RECENT CULTURES:  03-12 @ 16:28 .Sputum     Normal Respiratory Karol present    Numerous polymorphonuclear leukocytes per low power field  No Squamous epithelial cells per low power field  No organisms seen per oil power field    03-11 @ 08:58 .Urine     <10,000 CFU/mL Normal Urogenital Karol    03-10 @ 23:16 .Blood     No growth at 5 days.    03-10 @ 23:13      [facility]    All imaging and other data have been reviewed.      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 on 3/11, the same day after being discharged from [facility] to [facility] with desaturation and unresponsiveness. She was admitted for work up, now in MICU, seen by CT Surgery, Pulmonary and critical care.  ID has been called for fever and hypotension, and possible sepsis. She has been started on zosyn.     Respiratory failure   Sepsis  Fever and leukocytosis   Chronic Trach     - Blood culture neg   - Sputum culture with normal karol  - UC negative   - RVP neg   - Will send legionella U Ag.   - Agree with Zosyn 3.375gm q8h   - Will add azithromycin 500mg daily   - Leukocytosis most likely due to Steroid  - Continue atovaquone for PCP prophylaxis.   - Trend WBC and Tm    Will follow. [facility]  INFECTIOUS DISEASES AND INTERNAL MEDICINE at [place]  =======================================================  Chandrakant Dorantes MD FACP   Peng Honeycutt MD  Diplomates American Board of Internal Medicine and Infectious Diseases  =======================================================    [facility]-[id number]  ROMIE VIRAMONTES     CC: Patient is a 60y old  Female who presents with a chief complaint of Acute respiratory distress (19 Mar 2020 13:36)    HPI:  58 y/o woman with PMH of severe restrictive interstitial lung disease, s/p resp failure s/p trach 1/29, ventilator dependent, s/p PEA arrest (1/29), RLE DVT and RUE occlusive thrombus, toxic metabolic encephalopathy, severe sepsis secondary to serratia PNA, unspecified pleural/parenchymal bronchiectatic process with pulmonary nodules, spontaneous right-sided PTX (1/19), stage Peg NHL s/p chemo/XRT and [place] [facility] [place], 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 on 3/11, the same day after being discharged from [place] to [facility] with desaturation and unresponsiveness. She was admitted for work up, now in MICU, seen by CT Surgery, Pulmonary and critical care.  ID has been called for fever and hypotension, and possible sepsis. She has been started on zosyn.     PAST MEDICAL & SURGICAL HISTORY:  Interstitial lung disease: on home o2 prn  NHL (non-Hodgkin's lymphoma): Agem 45 sp chemo/rt/stem cell  Transient cerebral ischemia, unspecified type  Mitral prolapse  History of tonsillectomy  History of appendectomy    FAMILY HISTORY:  Family history of stroke  Family history of breast cancer: Mother    Allergies  IV Contrast (Anaphylaxis)  shellfish. (Anaphylaxis)    Antibiotics:  piperacillin/tazobactam IVPB.. 3.375 Gram(s) IV Intermittent every 8 hours     REVIEW OF SYSTEMS:  Intubated through trach, not answering     Physical Exam:  Vital Signs Last 24 Hrs  T(C): 36.9 (19 Mar 2020 12:00), Max: 37.7 (19 Mar 2020 04:00)  T(F): 98.4 (19 Mar 2020 12:00), Max: 99.9 (19 Mar 2020 04:00)  HR: 108 (19 Mar 2020 15:00) (89 - 108)  BP: 116/55 (19 Mar 2020 15:00) (80/49 - 116/55)  BP(mean): 78 (19 Mar 2020 15:00) (58 - 80)  RR: 42 (19 Mar 2020 15:00) (36 - 46)  SpO2: 93% (19 Mar 2020 15:00) (89% - 96%)  GEN: resting on bed, not responding   HEENT: normocephalic and atraumatic. EOMI. PERRL.    NECK: Supple.  No lymphadenopathy, trach in place on vent   LUNGS: bilateral rhonchi.   HEART: Regular rate and rhythm   ABDOMEN: Soft, nontender, and nondistended.  Positive bowel sounds.    : No CVA tenderness  EXTREMITIES: Without any cyanosis, clubbing, rash, lesions or edema.  NEUROLOGIC: grossly intact.  PSYCHIATRIC: Appropriate affect .  SKIN: No ulceration or induration present.    Labs:  03-19    136  |  84<L>  |  32.0<H>  ----------------------------<  136<H>  5.1   |  45.0<HH>  |  0.28<L>    Ca    9.7      19 Mar 2020 05:14  Mg     2.2     03-19                        8.0    21.09 )-----------( 705      ( 19 Mar 2020 10:35 )             28.7     ABG - ( 19 Mar 2020 10:55 )  pH, Arterial: 7.35  pH, Blood: x     /  pCO2: 94    /  pO2: 72    / HCO3: 48    / Base Excess: 23.0  /  SaO2: 95        RECENT CULTURES:  03-12 @ 16:28 .Sputum     Normal Respiratory Karol present    Numerous polymorphonuclear leukocytes per low power field  No Squamous epithelial cells per low power field  No organisms seen per oil power field    03-11 @ 08:58 .Urine     <10,000 CFU/mL Normal Urogenital Karol    03-10 @ 23:16 .Blood     No growth at 5 days.    03-10 @ 23:13      [facility]    All imaging and other data have been reviewed.      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 on 3/11, the same day after being discharged from [place] to [facility] with desaturation and unresponsiveness. She was admitted for work up, now in MICU, seen by CT Surgery, Pulmonary and critical care.  ID has been called for fever and hypotension, and possible sepsis. She has been started on zosyn.     Respiratory failure   Sepsis  Fever and leukocytosis   Chronic Trach     - Blood culture neg on 3/10, repeat ones pending   - Sputum culture with normal karol  - UC negative   - RVP neg   - Will send legionella U Ag.   - Agree with Zosyn 3.375gm q8h   - Will add azithromycin 500mg daily   - Leukocytosis most likely due to Steroid  - Continue atovaquone for PCP prophylaxis.   - Trend WBC and Tm    Will follow.

## 2020-03-19 NOTE — CONSULT NOTE ADULT - REASON FOR ADMISSION
Acute respiratory distress

## 2020-03-19 NOTE — PROGRESS NOTE ADULT - SUBJECTIVE AND OBJECTIVE BOX
OVERNIGHT EVENTS: doing poorly. remains very encephalopathic.     Present Symptoms:     Dyspnea: 2-3  Nausea/Vomiting: No  Anxiety:  Yes   Depression: unable   Fatigue: Yes   Loss of appetite: unable    Pain: none currently             Character-            Duration-            Effect-            Factors-            Frequency-            Location-            Severity-    Review of Systems: Reviewed               unable to obtain due to poor mentation   All others negative    MEDICATIONS  (STANDING):  ascorbic acid 500 milliGRAM(s) Oral daily  atovaquone Suspension 1500 milliGRAM(s) Oral daily  chlorhexidine 0.12% Liquid 15 milliLiter(s) Oral Mucosa every 12 hours  collagenase Ointment 1 Application(s) Topical daily  enoxaparin Injectable 40 milliGRAM(s) SubCutaneous daily  famotidine    Tablet 20 milliGRAM(s) Oral daily  ferrous    sulfate 325 milliGRAM(s) Oral two times a day  multivitamin 1 Tablet(s) Oral daily  OXcarbazepine 600 milliGRAM(s) Oral two times a day  polyethylene glycol 3350 17 Gram(s) Oral at bedtime  predniSONE   Tablet 20 milliGRAM(s) Oral daily  propranolol 20 milliGRAM(s) Oral every 6 hours  senna Syrup 10 milliLiter(s) Oral at bedtime  sodium chloride 0.9% Bolus 300 milliLiter(s) IV Bolus once  sodium chloride 0.9%. 1000 milliLiter(s) (83 mL/Hr) IV Continuous <Continuous>  sucralfate suspension 1 Gram(s) Oral every 6 hours    MEDICATIONS  (PRN):  acetaminophen    Suspension .. 650 milliGRAM(s) Oral every 6 hours PRN Temp greater or equal to 38C (100.4F), Mild Pain (1 - 3)  ALBUTerol   0.042% 1.25 milliGRAM(s) Nebulizer four times a day PRN Shortness of Breath and/or Wheezing  aluminum hydroxide/magnesium hydroxide/simethicone Suspension 30 milliLiter(s) Oral every 4 hours PRN Dyspepsia  melatonin 5 milliGRAM(s) Oral at bedtime PRN Insomnia  simethicone drops 40 milliGRAM(s) Oral every 6 hours PRN Gas    PHYSICAL EXAM:    Vital Signs Last 24 Hrs  T(C): 37.3 (19 Mar 2020 07:00), Max: 37.7 (19 Mar 2020 04:00)  T(F): 99.1 (19 Mar 2020 07:00), Max: 99.9 (19 Mar 2020 04:00)  HR: 95 (19 Mar 2020 09:00) (85 - 99)  BP: 80/49 (19 Mar 2020 09:00) (80/49 - 111/63)  BP(mean): 60 (19 Mar 2020 09:00) (58 - 80)  RR: 37 (19 Mar 2020 09:00) (36 - 46)  SpO2: 92% (19 Mar 2020 09:00) (89% - 96%)    General: unresponsive    Karnofsky:  20 %    HEENT: trach - connected to ventilator     Lungs: tachypnea/labored breathing      CV: normal      GI: normal. PEG     : romana    MSK: bedbound/wheelchair bound    Skin: no rash    LABS:                      8.0    21.09 )-----------( 705      ( 19 Mar 2020 10:35 )             28.7     03-19    136  |  84<L>  |  32.0<H>  ----------------------------<  136<H>  5.1   |  45.0<HH>  |  0.28<L>    Ca    9.7      19 Mar 2020 05:14  Mg     2.2     03-19    I&O's Summary    18 Mar 2020 07:01  -  19 Mar 2020 07:00  --------------------------------------------------------  IN: 2862 mL / OUT: 1025 mL / NET: 1837 mL    19 Mar 2020 07:01  -  19 Mar 2020 11:10  --------------------------------------------------------  IN: 286 mL / OUT: 55 mL / NET: 231 mL    RADIOLOGY & ADDITIONAL STUDIES:     ADVANCE DIRECTIVES: Full code

## 2020-03-19 NOTE — PROGRESS NOTE ADULT - ASSESSMENT
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 Renin ,Sukhdev. ?

## 2020-03-19 NOTE — CONSULT NOTE ADULT - PROVIDER SPECIALTY LIST ADULT
Critical Care
Critical Care
Infectious Disease
Infectious Disease
Nephrology
Palliative Care
CT Surgery
Pulmonology

## 2020-03-19 NOTE — PROGRESS NOTE ADULT - SUBJECTIVE AND OBJECTIVE BOX
Patient: ROMIE VIRAMONTES [id number] 60y Female                           Internal Medicine Hospitalist Progress Note    Seen with RN, noted hypotensive.  Still somnolent.  Tm 99.9    ____________________PHYSICAL EXAM:  GENERAL:  NAD, Arousable, less somnolent.   HEENT: NCAT  CARDIOVASCULAR:  S1, S2  LUNGS: CTAB  ABDOMEN:  soft, (-) tenderness, (-) distension, (+) bowel sounds, (-) guarding, (-) rebound (-) rigidity  EXTREMITIES:  no cyanosis / clubbing / edema.   NEURO: able to move RUE / RLE.   SKIN: Sacral Unstageable ~ 5x4cm ulcer with slough  ____________________    VITALS:  Vital Signs Last 24 Hrs  T(C): 36.9 (19 Mar 2020 12:00), Max: 37.7 (19 Mar 2020 04:00)  T(F): 98.4 (19 Mar 2020 12:00), Max: 99.9 (19 Mar 2020 04:00)  HR: 95 (19 Mar 2020 09:00) (86 - 99)  BP: 80/49 (19 Mar 2020 09:00) (80/49 - 111/63)  BP(mean): 60 (19 Mar 2020 09:00) (58 - 80)  RR: 37 (19 Mar 2020 09:00) (36 - 46)  SpO2: 92% (19 Mar 2020 09:00) (89% - 96%) Daily     Daily   CAPILLARY BLOOD GLUCOSE        I&O's Summary    18 Mar 2020 07:01  -  19 Mar 2020 07:00  --------------------------------------------------------  IN: 2862 mL / OUT: 1025 mL / NET: 1837 mL    19 Mar 2020 07:01  -  19 Mar 2020 12:02  --------------------------------------------------------  IN: 286 mL / OUT: 55 mL / NET: 231 mL        LABS:                        8.0    21.09 )-----------( 705      ( 19 Mar 2020 10:35 )             28.7     03-19    136  |  84<L>  |  32.0<H>  ----------------------------<  136<H>  5.1   |  45.0<HH>  |  0.28<L>    Ca    9.7      19 Mar 2020 05:14  Mg     2.2     03-19                    MEDICATIONS:  acetaminophen    Suspension .. 650 milliGRAM(s) Oral every 6 hours PRN  ALBUTerol   0.042% 1.25 milliGRAM(s) Nebulizer four times a day PRN  aluminum hydroxide/magnesium hydroxide/simethicone Suspension 30 milliLiter(s) Oral every 4 hours PRN  ascorbic acid 500 milliGRAM(s) Oral daily  atovaquone Suspension 1500 milliGRAM(s) Oral daily  chlorhexidine 0.12% Liquid 15 milliLiter(s) Oral Mucosa every 12 hours  collagenase Ointment 1 Application(s) Topical daily  enoxaparin Injectable 40 milliGRAM(s) SubCutaneous daily  famotidine    Tablet 20 milliGRAM(s) Oral daily  ferrous    sulfate 325 milliGRAM(s) Oral two times a day  melatonin 5 milliGRAM(s) Oral at bedtime PRN  methylPREDNISolone sodium succinate Injectable 40 milliGRAM(s) IV Push every 8 hours  multivitamin 1 Tablet(s) Oral daily  OXcarbazepine 600 milliGRAM(s) Oral two times a day  polyethylene glycol 3350 17 Gram(s) Oral at bedtime  propranolol 20 milliGRAM(s) Oral every 6 hours  senna Syrup 10 milliLiter(s) Oral at bedtime  simethicone drops 40 milliGRAM(s) Oral every 6 hours PRN  sodium chloride 0.9% Bolus 300 milliLiter(s) IV Bolus once  sodium chloride 0.9%. 1000 milliLiter(s) IV Continuous <Continuous>  sucralfate suspension 1 Gram(s) Oral every 6 hours

## 2020-03-20 LAB
ALDOST SERPL-MCNC: 10.3 NG/DL — SIGNIFICANT CHANGE UP
ANION GAP SERPL CALC-SCNC: 10 MMOL/L — SIGNIFICANT CHANGE UP (ref 5–17)
BASE EXCESS BLDA CALC-SCNC: 19.2 MMOL/L — HIGH (ref -3–3)
BLOOD GAS COMMENTS ARTERIAL: SIGNIFICANT CHANGE UP
BUN SERPL-MCNC: 24 MG/DL — HIGH (ref 8–20)
CALCIUM SERPL-MCNC: 8.9 MG/DL — SIGNIFICANT CHANGE UP (ref 8.6–10.2)
CHLORIDE SERPL-SCNC: 86 MMOL/L — LOW (ref 98–107)
CO2 SERPL-SCNC: 40 MMOL/L — HIGH (ref 22–29)
CREAT SERPL-MCNC: 0.28 MG/DL — LOW (ref 0.5–1.3)
GAS PNL BLDA: SIGNIFICANT CHANGE UP
GLUCOSE SERPL-MCNC: 170 MG/DL — HIGH (ref 70–99)
HCO3 BLDA-SCNC: 43 MMOL/L — HIGH (ref 20–26)
HCT VFR BLD CALC: 26 % — LOW (ref 34.5–45)
HGB BLD-MCNC: 7.6 G/DL — LOW (ref 11.5–15.5)
HOROWITZ INDEX BLDA+IHG-RTO: 36 — SIGNIFICANT CHANGE UP
MCHC RBC-ENTMCNC: 27.3 PG — SIGNIFICANT CHANGE UP (ref 27–34)
MCHC RBC-ENTMCNC: 29.2 GM/DL — LOW (ref 32–36)
MCV RBC AUTO: 93.5 FL — SIGNIFICANT CHANGE UP (ref 80–100)
PCO2 BLDA: 107 MMHG — CRITICAL HIGH (ref 35–45)
PH BLDA: 7.27 — LOW (ref 7.35–7.45)
PLATELET # BLD AUTO: 639 K/UL — HIGH (ref 150–400)
PO2 BLDA: 68 MMHG — LOW (ref 83–108)
POTASSIUM SERPL-MCNC: 4.3 MMOL/L — SIGNIFICANT CHANGE UP (ref 3.5–5.3)
POTASSIUM SERPL-SCNC: 4.3 MMOL/L — SIGNIFICANT CHANGE UP (ref 3.5–5.3)
PREALB SERPL-MCNC: 22 MG/DL — SIGNIFICANT CHANGE UP (ref 18–38)
PROCALCITONIN SERPL-MCNC: 0.12 NG/ML — HIGH (ref 0.02–0.1)
RBC # BLD: 2.78 M/UL — LOW (ref 3.8–5.2)
RBC # FLD: 18.1 % — HIGH (ref 10.3–14.5)
SAO2 % BLDA: 91 % — LOW (ref 95–99)
SODIUM SERPL-SCNC: 136 MMOL/L — SIGNIFICANT CHANGE UP (ref 135–145)
WBC # BLD: 20.83 K/UL — HIGH (ref 3.8–10.5)
WBC # FLD AUTO: 20.83 K/UL — HIGH (ref 3.8–10.5)

## 2020-03-20 PROCEDURE — 99291 CRITICAL CARE FIRST HOUR: CPT

## 2020-03-20 PROCEDURE — 99233 SBSQ HOSP IP/OBS HIGH 50: CPT

## 2020-03-20 PROCEDURE — 99232 SBSQ HOSP IP/OBS MODERATE 35: CPT

## 2020-03-20 PROCEDURE — 99497 ADVNCD CARE PLAN 30 MIN: CPT | Mod: 25

## 2020-03-20 RX ORDER — SODIUM CHLORIDE 9 MG/ML
500 INJECTION INTRAMUSCULAR; INTRAVENOUS; SUBCUTANEOUS ONCE
Refills: 0 | Status: COMPLETED | OUTPATIENT
Start: 2020-03-20 | End: 2020-03-20

## 2020-03-20 RX ADMIN — ATOVAQUONE 1500 MILLIGRAM(S): 750 SUSPENSION ORAL at 11:04

## 2020-03-20 RX ADMIN — Medication 650 MILLIGRAM(S): at 21:32

## 2020-03-20 RX ADMIN — PIPERACILLIN AND TAZOBACTAM 25 GRAM(S): 4; .5 INJECTION, POWDER, LYOPHILIZED, FOR SOLUTION INTRAVENOUS at 13:39

## 2020-03-20 RX ADMIN — Medication 650 MILLIGRAM(S): at 02:43

## 2020-03-20 RX ADMIN — Medication 1 TABLET(S): at 11:03

## 2020-03-20 RX ADMIN — Medication 650 MILLIGRAM(S): at 22:30

## 2020-03-20 RX ADMIN — SENNA PLUS 10 MILLILITER(S): 8.6 TABLET ORAL at 21:33

## 2020-03-20 RX ADMIN — PIPERACILLIN AND TAZOBACTAM 25 GRAM(S): 4; .5 INJECTION, POWDER, LYOPHILIZED, FOR SOLUTION INTRAVENOUS at 21:32

## 2020-03-20 RX ADMIN — Medication 650 MILLIGRAM(S): at 08:57

## 2020-03-20 RX ADMIN — Medication 650 MILLIGRAM(S): at 08:27

## 2020-03-20 RX ADMIN — Medication 325 MILLIGRAM(S): at 05:09

## 2020-03-20 RX ADMIN — SODIUM CHLORIDE 250 MILLILITER(S): 9 INJECTION INTRAMUSCULAR; INTRAVENOUS; SUBCUTANEOUS at 23:10

## 2020-03-20 RX ADMIN — Medication 325 MILLIGRAM(S): at 17:13

## 2020-03-20 RX ADMIN — Medication 30 MILLILITER(S): at 05:16

## 2020-03-20 RX ADMIN — Medication 40 MILLIGRAM(S): at 05:09

## 2020-03-20 RX ADMIN — Medication 1 GRAM(S): at 17:12

## 2020-03-20 RX ADMIN — OXCARBAZEPINE 600 MILLIGRAM(S): 300 TABLET, FILM COATED ORAL at 05:09

## 2020-03-20 RX ADMIN — ENOXAPARIN SODIUM 40 MILLIGRAM(S): 100 INJECTION SUBCUTANEOUS at 11:03

## 2020-03-20 RX ADMIN — Medication 1 APPLICATION(S): at 11:04

## 2020-03-20 RX ADMIN — Medication 500 MILLIGRAM(S): at 11:03

## 2020-03-20 RX ADMIN — PIPERACILLIN AND TAZOBACTAM 25 GRAM(S): 4; .5 INJECTION, POWDER, LYOPHILIZED, FOR SOLUTION INTRAVENOUS at 05:09

## 2020-03-20 RX ADMIN — Medication 1 GRAM(S): at 11:03

## 2020-03-20 RX ADMIN — Medication 40 MILLIGRAM(S): at 21:32

## 2020-03-20 RX ADMIN — AZITHROMYCIN 255 MILLIGRAM(S): 500 TABLET, FILM COATED ORAL at 17:45

## 2020-03-20 RX ADMIN — Medication 40 MILLIGRAM(S): at 13:39

## 2020-03-20 RX ADMIN — ALBUTEROL 1.25 MILLIGRAM(S): 90 AEROSOL, METERED ORAL at 12:13

## 2020-03-20 RX ADMIN — CHLORHEXIDINE GLUCONATE 15 MILLILITER(S): 213 SOLUTION TOPICAL at 05:09

## 2020-03-20 RX ADMIN — OXCARBAZEPINE 600 MILLIGRAM(S): 300 TABLET, FILM COATED ORAL at 17:13

## 2020-03-20 RX ADMIN — FAMOTIDINE 20 MILLIGRAM(S): 10 INJECTION INTRAVENOUS at 11:03

## 2020-03-20 RX ADMIN — SODIUM CHLORIDE 83 MILLILITER(S): 9 INJECTION INTRAMUSCULAR; INTRAVENOUS; SUBCUTANEOUS at 05:09

## 2020-03-20 RX ADMIN — Medication 1 GRAM(S): at 05:09

## 2020-03-20 RX ADMIN — CHLORHEXIDINE GLUCONATE 15 MILLILITER(S): 213 SOLUTION TOPICAL at 17:45

## 2020-03-20 RX ADMIN — Medication 650 MILLIGRAM(S): at 01:55

## 2020-03-20 NOTE — PROGRESS NOTE ADULT - SUBJECTIVE AND OBJECTIVE BOX
Patient seen and examined    I&O's Summary    19 Mar 2020 07:01  -  20 Mar 2020 07:00  --------------------------------------------------------  IN: 4807 mL / OUT: 1200 mL / NET: 3607 mL    20 Mar 2020 07:01  -  20 Mar 2020 20:37  --------------------------------------------------------  IN: 1962 mL / OUT: 510 mL / NET: 1452 mL        Remains vented via trach - NAD  No fever, leukocytosis is 20k. Awake and alert responding with nodding.     Vital Signs Last 24 Hrs  T(C): 37.9 (20 Mar 2020 20:00), Max: 37.9 (20 Mar 2020 20:00)  T(F): 100.2 (20 Mar 2020 20:00), Max: 100.2 (20 Mar 2020 20:00)  HR: 99 (20 Mar 2020 20:00) (90 - 124)  BP: 97/53 (20 Mar 2020 20:00) (88/57 - 118/68)  BP(mean): 70 (20 Mar 2020 20:00) (66 - 88)  RR: 39 (20 Mar 2020 20:00) (38 - 49)  SpO2: 93% (20 Mar 2020 20:00) (88% - 100%)    PHYSICAL EXAM:    GENERAL: NAD,   EYES:  conjunctiva and sclera clear  NECK: Supple, No JVD/Bruit  NERVOUS SYSTEM:  A/O x3,   CHEST:  No rales or rhonchi  HEART:  RRR, No murmur  ABDOMEN: Soft, NT/ND BS+  EXTREMITIES:  No Edema;  SKIN: No rashes    LABS:                          7.6    20.83 )-----------( 639      ( 20 Mar 2020 03:38 )             26.0     03-20    136  |  86<L>  |  24.0<H>  ----------------------------<  170<H>  4.3   |  40.0<H>  |  0.28<L>    Ca    8.9      20 Mar 2020 03:38  Mg     2.2     03-19        MEDICATIONS  (STANDING):  acetaminophen    Suspension .. PRN  ALBUTerol   0.042% PRN  aluminum hydroxide/magnesium hydroxide/simethicone Suspension PRN  ascorbic acid  atovaquone Suspension  azithromycin  IVPB  chlorhexidine 0.12% Liquid  collagenase Ointment  enoxaparin Injectable  famotidine    Tablet  ferrous    sulfate  melatonin PRN  methylPREDNISolone sodium succinate Injectable  multivitamin  OXcarbazepine  piperacillin/tazobactam IVPB..  polyethylene glycol 3350  propranolol  senna Syrup  simethicone drops PRN  sucralfate suspension

## 2020-03-20 NOTE — GOALS OF CARE CONVERSATION - ADVANCED CARE PLANNING - CAREGIVER NAME
Shashi Kwong and daughter Eva Kwong
Shashi Kwong
Shashi Kwong and daughter Eva Kwong
Marialuisa Kwong and daughter Eva Kwong

## 2020-03-20 NOTE — CHART NOTE - NSCHARTNOTEFT_GEN_A_CORE
Called by RN to report pt hypotensive. BP 80/48 (MAP 59 mmHg). Pt is a 61 y/o female admitted for respiratory distress-vent dependent, severe restrictive interstitial lung disease also being treated for Hyponatremia, Severe protein calorie Malnutrition. Pt was seen 3/19 for hypotension and fever, fever w/u completed and antibiotics started. ID onboard. BC x2 pending results. Pt with soft BPs today. Upon evaluation pt on vent, +rales B/L lower lobes, no wheezing. Cardiac: mildly tachycardic, +S1/S2. Remains tachypneic on ventilation as previously noted despite adequate volumes on pressure control. Pulmonary recommendations appreciated, on appropriate treatment, poor prognosis. Attending's discussion today with family noted, pt made DNR. CXR 3/19 IMPRESSION: Pulmonary fibrosis. Small right pneumothorax, unchanged. 1/10/2020 ECHO with EF 65%. Per nephrology consult today pt may resume on NS. Ordered sodium chloride 500mL bolus x1 dose and re-evaluate BP s/p completion of infusion. Discussed pt with Dr. Hernandez, in agreement with current plan of care, F/U BP. RN instructed to continue to monitor pt and notify provider of any changes in pt status. Called by RN to report pt hypotensive. BP 80/48 (MAP 59 mmHg). Pt is a 61 y/o female admitted for respiratory distress-vent dependent, severe restrictive interstitial lung disease also being treated for Hyponatremia, Severe protein calorie Malnutrition. Pt was seen 3/19 for hypotension and fever, fever w/u completed and antibiotics started. ID onboard. BC x2 pending results. Pt with soft BPs today. Upon evaluation pt on vent, +rales B/L lower lobes, no wheezing. Cardiac: mildly tachycardic, +S1/S2. Remains tachypneic on ventilation as previously noted despite adequate volumes on pressure control. Pulmonary recommendations appreciated, on appropriate treatment, poor prognosis. Attending's discussion today with family noted, pt made DNR. CXR 3/19 IMPRESSION: Pulmonary fibrosis. Small right pneumothorax, unchanged. 1/10/2020 ECHO with EF 65%. Per nephrology consult today pt may resume on NS. Ordered sodium chloride 500mL bolus x1 dose and re-evaluate BP s/p completion of infusion. Discussed pt with Dr. Hernandez, in agreement with current plan of care, F/U BP. RN instructed to continue to monitor pt and notify provider of any changes in pt status.    F/U BP 74/42 s/p 500mL IVF bolus. Ordered an additional 500mL IVF bolus to be given and midodrine 5mg via PEG x1 dose. Repeat BP s/p completion of IVF. Called out to ICU team for consult. Spoke with Dr. Ronquillo, informed of pt status. Recommend to give 1-2amps of sodium bicarbonate x1 dose now and ICU will consult patient. Pt on vent, PCO2 at bedside read 133. STAT ABG ordered. ABG - ( 21 Mar 2020 00:59 )  pH, Arterial: 7.17  pH, Blood: x     /  pCO2: >108  /  pO2: 68    / HCO3: 42    / Base Excess: 17.9  /  SaO2: 91 . Called by RN to report pt hypotensive. BP 80/48 (MAP 59 mmHg). Pt is a 61 y/o female admitted for respiratory distress-vent dependent, severe restrictive interstitial lung disease also being treated for Hyponatremia, Severe protein calorie Malnutrition. Pt was seen 3/19 for hypotension and fever, fever w/u completed and antibiotics started. ID onboard. BC x2 pending results. Pt with soft BPs today. Upon evaluation pt on vent, +rales B/L lower lobes, no wheezing. Cardiac: mildly tachycardic, +S1/S2. Remains tachypneic on ventilation as previously noted despite adequate volumes on pressure control. Pulmonary recommendations appreciated, on appropriate treatment, poor prognosis. Attending's discussion today with family noted, pt made DNR. CXR 3/19 IMPRESSION: Pulmonary fibrosis. Small right pneumothorax, unchanged. 1/10/2020 ECHO with EF 65%. Per nephrology consult today pt may resume on NS. Ordered sodium chloride 500mL bolus x1 dose and re-evaluate BP s/p completion of infusion. Discussed pt with Dr. Hernandez, in agreement with current plan of care, F/U BP. RN instructed to continue to monitor pt and notify provider of any changes in pt status.    F/U BP 74/42 s/p 500mL IVF bolus. Ordered an additional 500mL IVF bolus to be given and midodrine 5mg via PEG x1 dose. Repeat BP s/p completion of IVF. Called out to ICU team for consult. Spoke with Dr. Ronquillo, informed of pt status. Recommend to give 1-2amps of sodium bicarbonate x1 dose now and ICU will consult patient, ordered bicarb. Pt on vent, PCO2 at bedside read 133. STAT ABG ordered. ABG - ( 21 Mar 2020 00:59 ) pH, Arterial: 7.17  pH, Blood: x     /  pCO2: >108  /  pO2: 68    / HCO3: 42    / Base Excess: 17.9  /  SaO2: 91 . Discussed with Dr. Hernandez, recommended to contact family and discuss pt current status and plan of care moving forward, pending ICU evaluation. Dr. Suárez at pt bedside Called by RN to report pt hypotensive. BP 80/48 (MAP 59 mmHg). Pt is a 61 y/o female admitted for respiratory distress-vent dependent, severe restrictive interstitial lung disease also being treated for Hyponatremia, Severe protein calorie Malnutrition. Pt was seen 3/19 for hypotension and fever, fever w/u completed and antibiotics started. ID onboard. BC x2 pending results. Pt with soft BPs today. Upon evaluation pt on vent, +rales B/L lower lobes, no wheezing. Cardiac: mildly tachycardic, +S1/S2. Remains tachypneic on ventilation as previously noted despite adequate volumes on pressure control. Pulmonary recommendations appreciated, on appropriate treatment, poor prognosis. Attending's discussion today with family noted, pt made DNR. CXR 3/19 IMPRESSION: Pulmonary fibrosis. Small right pneumothorax, unchanged. 1/10/2020 ECHO with EF 65%. Per nephrology consult today pt may resume on NS. Ordered sodium chloride 500mL bolus x1 dose and re-evaluate BP s/p completion of infusion. Discussed pt with Dr. Hernandez, in agreement with current plan of care, F/U BP. RN instructed to continue to monitor pt and notify provider of any changes in pt status.    F/U BP 74/42 s/p 500mL IVF bolus. Ordered an additional 500mL IVF bolus to be given and midodrine 5mg via PEG x1 dose. Repeat BP s/p completion of IVF. Called out to ICU team for consult. Spoke with Dr. Ronquillo, informed of pt status. Recommend to give 1-2amps of sodium bicarbonate x1 dose now and ICU will consult patient, ordered bicarb. Pt on vent, PCO2 at bedside read 133. STAT ABG ordered. ABG - ( 21 Mar 2020 00:59 ) pH, Arterial: 7.17  pH, Blood: x     /  pCO2: >108  /  pO2: 68    / HCO3: 42    / Base Excess: 17.9  /  SaO2: 91 . Pt with worsening ABG despite adjustment in ventilation settings. Discussed with Dr. Hernandez, recommended to contact family and discuss pt current status and plan of care moving forward, pending ICU evaluation. Dr. Suárez at pt bedside, poor prognosis. Dr. Suárez contacted pt spouse and discussed pt current status. Spouse agreed to make pt comfort measures only. Pt spouse and daughter came to facility now at pt bedside. Pt DNR, comfort measures at this time.

## 2020-03-20 NOTE — PROGRESS NOTE ADULT - REASON FOR ADMISSION
Acute respiratory distress

## 2020-03-20 NOTE — PROGRESS NOTE ADULT - SUBJECTIVE AND OBJECTIVE BOX
[facility]  INFECTIOUS DISEASES AND INTERNAL MEDICINE at [place]  =======================================================  Chandrakant Honeycutt MD  Diplomates American Board of Internal Medicine and Infectious Diseases  =======================================================    [id number]  ROMIE VIRAMONTES     Follow up; Respiratory failure r/o sepsis    No fever, leukocytosis is 20k. Awake and alert responding with nodding. NAD on vent.     PAST MEDICAL & SURGICAL HISTORY:  Interstitial lung disease: on home o2 prn  NHL (non-Hodgkin's lymphoma): Agem 43 sp chemo/rt/stem cell  Transient cerebral ischemia, unspecified type  Mitral prolapse  History of tonsillectomy  History of appendectomy    FAMILY HISTORY:  Family history of stroke  Family history of breast cancer: Mother    Allergies  IV Contrast (Anaphylaxis)  shellfish. (Anaphylaxis)    Antibiotics:  piperacillin/tazobactam IVPB.. 3.375 Gram(s) IV Intermittent every 8 hours     REVIEW OF SYSTEMS:  Intubated through trach, not answering     Physical Exam:  Vital Signs Last 24 Hrs  T(C): 37.1 (20 Mar 2020 11:41), Max: 37.4 (19 Mar 2020 16:36)  T(F): 98.8 (20 Mar 2020 11:41), Max: 99.3 (19 Mar 2020 16:36)  HR: 108 (20 Mar 2020 12:23) (90 - 124)  BP: 98/57 (20 Mar 2020 12:00) (87/53 - 116/55)  BP(mean): 75 (20 Mar 2020 12:00) (63 - 83)  RR: 47 (20 Mar 2020 12:00) (36 - 49)  SpO2: 92% (20 Mar 2020 12:23) (88% - 96%)  GEN: resting on bed, not responding   HEENT: normocephalic and atraumatic. EOMI. PERRL.    NECK: Supple.  No lymphadenopathy, trach in place on vent   LUNGS: bilateral rhonchi.   HEART: Regular rate and rhythm   ABDOMEN: Soft, nontender, and nondistended.  Positive bowel sounds.    : No CVA tenderness  EXTREMITIES: Without any cyanosis, clubbing, rash, lesions or edema.  NEUROLOGIC: grossly intact.  PSYCHIATRIC: Appropriate affect .  SKIN: No ulceration or induration present.    Labs:      136  |  86<L>  |  24.0<H>  ----------------------------<  170<H>  4.3   |  40.0<H>  |  0.28<L>    Ca    8.9      20 Mar 2020 03:38  Mg     2.2                             7.6    20.83 )-----------( 639      ( 20 Mar 2020 03:38 )             26.0     Urinalysis Basic - ( 19 Mar 2020 16:32 )    Color: Yellow / Appearance: Slightly Turbid / S.020 / pH: x  Gluc: x / Ketone: Negative  / Bili: Negative / Urobili: Negative mg/dL   Blood: x / Protein: 30 mg/dL / Nitrite: Negative   Leuk Esterase: Moderate / RBC: 3-5 /HPF / WBC >50   Sq Epi: x / Non Sq Epi: Occasional / Bacteria: Few    ABG - ( 19 Mar 2020 10:55 )  pH, Arterial: 7.35  pH, Blood: x     /  pCO2: 94    /  pO2: 72    / HCO3: 48    / Base Excess: 23.0  /  SaO2: 95        RECENT CULTURES:   @ 16:28 .Sputum     Normal Respiratory Karol present    Numerous polymorphonuclear leukocytes per low power field  No Squamous epithelial cells per low power field  No organisms seen per oil power field     @ 08:58 .Urine     <10,000 CFU/mL Normal Urogenital Karol    03-10 @ 23:16 .Blood     No growth at 5 days.    03-10 @ 23:13      NotDetec    All imaging and other data have been reviewed.      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 She was admitted for work up, now in MICU, seen by CT Surgery, Pulmonary and critical care.  ID has been called for fever and hypotension, and possible sepsis. She has been started on zosyn.     Respiratory failure   Sepsis  Fever and leukocytosis   Chronic Trach     - Blood culture neg on 3/10, repeat ones pending   - Sputum culture with normal karol  - UC negative   - RVP neg   - legionella U Ag pending   - Continue Zosyn 3.375gm q8h   - Continue azithromycin 500mg daily   - Leukocytosis most likely due to Steroid  - Continue atovaquone for PCP prophylaxis.   - Trend WBC and Tm  - Will switch antibiotics based on culture results.     Will follow.

## 2020-03-20 NOTE — PROGRESS NOTE ADULT - ASSESSMENT
Improved Hyponatremia  Severe protein calorie Malnutrition  decreased effective solutes  Has h/o respiratory failure +trach vent dependent  Na level stable     Metabolic alkalosis / chronic Resp acidosis - compensatory component   superimposed posthypercapneic component   ? contraction alkalosis   Will add IV NS, HCO3 improving  Sends off urine studies   vent adjustments as per Pulmonary

## 2020-03-20 NOTE — PROGRESS NOTE ADULT - ASSESSMENT
Initial HPI:  This is a 58 y/o female with history of severe restrictive interstitial lung disease, s/p resp failure s/p trach 1/29, ventilator dependent, s/p PEA arrest (1/29), RLE DVT and RUE occlusive thrombus, toxic metabolic encephalopathy, severe sepsis secondary to serratia PNA, unspecified pleural/parenchymal bronchiectatic process with pulmonary nodules for which she has opposed steroid therapy (prior), spontaneous right-sided PTX (1/19), stage Peg NHL (s/p chemo/XRT and SCT received '03 at [facility] - in remission) 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 Patient was discharged to [facility], on the day of arrival to [facility], from [facility]. Upon arrival to [facility] pt was placed on volume control ventilation at which time she desaturated, became unresponsive and in acute respiratory distress.  Admitted for evaluation, seen by CT Surgery, Pulmonary and critical care.  She has remained on pressure control ventilation, tachypneic and tachycardic.  Per pulmonary, her tidal volumes are appropriate.  Most recent ABG 7.35/94/72/48    #Acute on chronic respiratory failure; Small R Pneumothorax, stable; Severe ILD; Suspected Gram positive and Gram negative pneumonia:   Poor prognosis d/w palliative care and pulmonary.  Despite adequate volumes on pressure control, still hypercarbic with a compensated respiratory acidosis.   Poor prognosis.   Cont prednisone 20mg for ILD with Atovaquone for PCP prophylaxis.   On IV Abx, ID input appreciated.   #Metabolic encephalopathy - multifactorial.  Mental status appears to be baseline.  D/c CT Head.  Holding Remeron, Oxycodone, Duragesic.      #Fever - Now hypotensive, WBC increased.  Check cultures.  Start on Zosyn, Zithromax.  ID evaluation.   #Anxiety - and tachycardic at times.  Restart Propranolol at low dose.   #Acute on Chronic blood loss anemia - Stool OB was negative.  Monitor H/H,.  #Right Brachial Vein Thrombosis & Left Femoral DVT, present on arrival; due to line in that arm previously; since removed.  Repeat U/S showed no DVT in UE/ LEs.  Currently off anticoagulation due to bleed.  Given comorbid conditions and extensive conversation with family on 3/15, will observe off anticoagulation.    #Chronic Pain - Pt appears to be comfortable.  stopped Duragesic.  #Severe protein calorie Malnutrition - - cont TF: Nepro  #H/o Constipation, cont bowel regimen  #h/o Sacrum/coccyx pressure ulcer, present on admission - cont daily wound care, cont Santyl  # Chronic Anemia - H/H appears to be stable.  No s/s of bleeding.    # DVT Prophylaxis - Lovenox subcut    Plan of care discussed with palliative care.  Pt's pulmonologist Dr. Hazel was contacted by them and condition discussed.    Poor prognosis with little hope for improvement. Initial HPI:  This is a 58 y/o female with history of severe restrictive interstitial lung disease, s/p resp failure s/p trach 1/29, ventilator dependent, s/p PEA arrest (1/29), RLE DVT and RUE occlusive thrombus, toxic metabolic encephalopathy, severe sepsis secondary to serratia PNA, unspecified pleural/parenchymal bronchiectatic process with pulmonary nodules for which she has opposed steroid therapy (prior), spontaneous right-sided PTX (1/19), stage Peg NHL (s/p chemo/XRT and SCT received '03 at [facility] - in remission) 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 Patient was discharged to [facility], on the day of arrival to [facility], from [facility]. Upon arrival to [facility] pt was placed on volume control ventilation at which time she desaturated, became unresponsive and in acute respiratory distress.  Admitted for evaluation, seen by CT Surgery, Pulmonary and critical care.  She has remained on pressure control ventilation, tachypneic and tachycardic.  Per pulmonary, her tidal volumes are appropriate.  Most recent ABG 7.35/94/72/48    #Acute on chronic respiratory failure; Small R Pneumothorax, stable; Severe ILD; Suspected Gram positive and Gram negative pneumonia:   Poor prognosis d/w palliative care and pulmonary.  Despite adequate volumes on pressure control, still hypercarbic with a compensated respiratory acidosis.   Poor prognosis.   Cont prednisone 20mg for ILD with Atovaquone for PCP prophylaxis.   On IV Abx, ID input appreciated.   #Metabolic encephalopathy - multifactorial.  Mental status appears to be baseline.  D/c CT Head.  Holding Remeron, Oxycodone, Duragesic.      #Fever - Now hypotensive, WBC increased.  Check cultures.  Start on Zosyn, Zithromax.  ID evaluation appreciated  #Anxiety - and tachycardic at times.  Restart Propranolol at low dose.   #Acute on Chronic blood loss anemia - Stool OB was negative.  Monitor H/H,.  #Right Brachial Vein Thrombosis & Left Femoral DVT, present on arrival; due to line in that arm previously; since removed.  Repeat U/S showed no DVT in UE/ LEs.  Currently off anticoagulation due to bleed.  Given comorbid conditions and extensive conversation with family on 3/15, will observe off anticoagulation.    #Chronic Pain - Pt appears to be comfortable.  stopped Duragesic.  #Severe protein calorie Malnutrition - - cont TF: Nepro  #H/o Constipation, cont bowel regimen  #h/o Sacrum/coccyx pressure ulcer, present on admission - cont daily wound care, cont Santyl  # Chronic Anemia - H/H appears to be stable.  No s/s of bleeding.    # DVT Prophylaxis - Lovenox subcut    Plan of care discussed with palliative care.  Pt's pulmonologist Dr. Hazel was contacted by them and condition discussed.    Poor prognosis with little hope for improvement.  Awaiting family meeting today.

## 2020-03-20 NOTE — PROGRESS NOTE ADULT - ASSESSMENT
60F with severe ILD, respiratory failure, s/p PEA arrest s/p trach (1/29), recent RLE/RUE thrombus, stage IV NHL s/p chemo/RT and stem cell transplant ( 2003 @ [facility] ? remission), previous CVA with left sided weakness, admitted from [facility] with change in mental status and acute on chronic hypercapnic respiratory failure, now obtunded, with fever and increased work of breathing with grave prognosis.     #1 Acute on chronic respiratory failure - ventilator dependence. pulmonary following. on steroids. PCP PPX. ? HCAP ? repeat lung imaging?   #2 Fever - ? repeat cultures and/or chest imaging?   #3 ILD - end stage. grave prognosis. ? acute on chronic exacerbation, discussed with pulmonary. Also contacted patient's primary ILD specialist Dr. Bettye Hazel at [place] -who has known her and family for years.   #4 Encounter for palliative care 60F with severe ILD, respiratory failure, s/p PEA arrest s/p trach (1/29), recent RLE/RUE thrombus, stage IV NHL s/p chemo/RT and stem cell transplant ( 2003 @ [facility] ? remission), previous CVA with left sided weakness, admitted from [facility] with change in mental status and acute on chronic hypercapnic respiratory failure, now obtunded, with fever and increased work of breathing with grave prognosis.     #1 Acute on chronic respiratory failure - ventilator dependence. pulmonary following. on steroids. PCP PPX. ? HCAP ? repeat lung imaging?   #2 Fever - ? repeat cultures and/or chest imaging?   #3 ILD - end stage. grave prognosis. ? acute on chronic exacerbation, discussed with pulmonary. Also contacted patient's primary ILD specialist Dr. Bettye Hazel at [place] -who has known her and family for years.   #4 Encounter for palliative care - lengthy family meeting with  Marialuisa and daughter Eva with Dr. Ventura and UNA COVARRUBIAS. We addressed the overall guarded prognosis given her end stage lung disease and up and down nature of her last couple of months. I addressed with them that her biggest becca is her severe cachexia, debility, and she is very high risk for complications and acute decompensation and any one of her complications could result in her death. I discussed with them that given her end stage lung disease and overall clinical condition, if she were to have a cardiac arrest at this point, CPR would unlikely be medically therapeutic.  and daughter agreed, but state they are not ready for ventilator withdrawal or comfort care yet. They do agree they do not want her to suffer and know the overall outcome but want to try to give her as much time of having meaningful interactions with them. We discussed balancing pain and anxiety medications and her alertness, they agree to low dose oxycodone for now.

## 2020-03-20 NOTE — PROGRESS NOTE ADULT - SUBJECTIVE AND OBJECTIVE BOX
Patient: ROMIE VIRAMONTES [id number] 60y Female                           Internal Medicine Hospitalist Progress Note    Seen with RN.  Remains tachypneic, on pressure control ventilation.  Appears to be more awake, off Duragesic.  Denies anxiety / pain.     ____________________PHYSICAL EXAM:  GENERAL:  NAD, Alert.  Responsive.  On vent support.    HEENT: NCAT  CARDIOVASCULAR:  S1, S2  LUNGS: CTAB  ABDOMEN:  soft, (-) tenderness, (-) distension, (+) bowel sounds, (-) guarding, (-) rebound (-) rigidity  EXTREMITIES:  no cyanosis / clubbing / edema.   NEURO: able to move RUE / RLE.   SKIN: Sacral Unstageable ~ 5x4cm ulcer with slough  ____________________    VITALS:  Vital Signs Last 24 Hrs  T(C): 37.1 (20 Mar 2020 11:41), Max: 37.4 (19 Mar 2020 16:36)  T(F): 98.8 (20 Mar 2020 11:41), Max: 99.3 (19 Mar 2020 16:36)  HR: 108 (20 Mar 2020 10:00) (90 - 124)  BP: 97/55 (20 Mar 2020 10:00) (87/53 - 116/55)  BP(mean): 69 (20 Mar 2020 10:00) (63 - 83)  RR: 44 (20 Mar 2020 10:00) (36 - 49)  SpO2: 90% (20 Mar 2020 10:00) (88% - 96%) Daily     Daily   CAPILLARY BLOOD GLUCOSE        I&O's Summary    19 Mar 2020 07:  -  20 Mar 2020 07:00  --------------------------------------------------------  IN: 4807 mL / OUT: 1200 mL / NET: 3607 mL    20 Mar 2020 07:  -  20 Mar 2020 12:05  --------------------------------------------------------  IN: 692 mL / OUT: 195 mL / NET: 497 mL        LABS:                        7.6    20.83 )-----------( 639      ( 20 Mar 2020 03:38 )             26.0     03-20    136  |  86<L>  |  24.0<H>  ----------------------------<  170<H>  4.3   |  40.0<H>  |  0.28<L>    Ca    8.9      20 Mar 2020 03:38  Mg     2.2     03-19          Urinalysis Basic - ( 19 Mar 2020 16:32 )    Color: Yellow / Appearance: Slightly Turbid / S.020 / pH: x  Gluc: x / Ketone: Negative  / Bili: Negative / Urobili: Negative mg/dL   Blood: x / Protein: 30 mg/dL / Nitrite: Negative   Leuk Esterase: Moderate / RBC: 3-5 /HPF / WBC >50   Sq Epi: x / Non Sq Epi: Occasional / Bacteria: Few              MEDICATIONS:  acetaminophen    Suspension .. 650 milliGRAM(s) Oral every 6 hours PRN  ALBUTerol   0.042% 1.25 milliGRAM(s) Nebulizer four times a day PRN  aluminum hydroxide/magnesium hydroxide/simethicone Suspension 30 milliLiter(s) Oral every 4 hours PRN  ascorbic acid 500 milliGRAM(s) Oral daily  atovaquone Suspension 1500 milliGRAM(s) Oral daily  azithromycin  IVPB 500 milliGRAM(s) IV Intermittent every 24 hours  chlorhexidine 0.12% Liquid 15 milliLiter(s) Oral Mucosa every 12 hours  collagenase Ointment 1 Application(s) Topical daily  enoxaparin Injectable 40 milliGRAM(s) SubCutaneous daily  famotidine    Tablet 20 milliGRAM(s) Oral daily  ferrous    sulfate 325 milliGRAM(s) Oral two times a day  melatonin 5 milliGRAM(s) Oral at bedtime PRN  methylPREDNISolone sodium succinate Injectable 40 milliGRAM(s) IV Push every 8 hours  multivitamin 1 Tablet(s) Oral daily  OXcarbazepine 600 milliGRAM(s) Oral two times a day  piperacillin/tazobactam IVPB.. 3.375 Gram(s) IV Intermittent every 8 hours  polyethylene glycol 3350 17 Gram(s) Oral at bedtime  propranolol 10 milliGRAM(s) Oral every 12 hours  senna Syrup 10 milliLiter(s) Oral at bedtime  simethicone drops 40 milliGRAM(s) Oral every 6 hours PRN  sodium chloride 0.9%. 1000 milliLiter(s) IV Continuous <Continuous>  sucralfate suspension 1 Gram(s) Oral every 6 hours

## 2020-03-20 NOTE — CHART NOTE - NSCHARTNOTEFT_GEN_A_CORE
Contacted pt's daughter Eva via phone.  Discussed ABG findings.  Option of comfort measures / morphine gtt reviewed.  She is aware that the worsening ABG portends a poor prognosis.  Discussed with critical care and pulmonary.  No further recommendations.   Daughter wishes to continue current plan of care.  Does not wish for sedatives / morphine to be started.  Aware of likely deterioration / death / arrest.

## 2020-03-20 NOTE — CHART NOTE - NSCHARTNOTEFT_GEN_A_CORE
Source: Patient [ ]  Family [ ]   other [x ]    Current Diet: Diet, NPO with Tube Feed:   Tube Feeding Modality: Gastrostomy  Jevity 1.5 Wallace  Total Volume for 24 Hours (mL): 1440  Continuous  Until Goal Tube Feed Rate (mL per Hour): 60  Tube Feed Duration (in Hours): 24  Tube Feed Start Time: 09:30  Supplement Feeding Modality:  Gastrostomy  Prostat Cans or Servings Per Day:  1       Frequency:  Daily (03-12-20 @ 09:27)    Enteral /Parenteral Nutrition: Tube feeds running at goal rate of 60 ml/hr (x20 hrs) 1200 ml, 1800 kcal, 77g protein, 912 ml free water, and >100% of RDIs for vitamins/minerals. Also receiving Prostat 30 ml once daily to provide an additional 100 kcal, 15g protein.     Current Weight:  (3/18) 128.5 lbs  (3/17) 131.1 lbs  (3/12) 136.2 lbs  ? accuracy of weights, noted with 1+ generalized edema and 2+ b/l hand edema, continue to trend and maintain strict Is&Os     Pertinent Medications: MEDICATIONS  (STANDING):  ascorbic acid 500 milliGRAM(s) Oral daily  atovaquone Suspension 1500 milliGRAM(s) Oral daily  azithromycin  IVPB 500 milliGRAM(s) IV Intermittent every 24 hours  chlorhexidine 0.12% Liquid 15 milliLiter(s) Oral Mucosa every 12 hours  collagenase Ointment 1 Application(s) Topical daily  enoxaparin Injectable 40 milliGRAM(s) SubCutaneous daily  famotidine    Tablet 20 milliGRAM(s) Oral daily  ferrous    sulfate 325 milliGRAM(s) Oral two times a day  methylPREDNISolone sodium succinate Injectable 40 milliGRAM(s) IV Push every 8 hours  multivitamin 1 Tablet(s) Oral daily  OXcarbazepine 600 milliGRAM(s) Oral two times a day  piperacillin/tazobactam IVPB.. 3.375 Gram(s) IV Intermittent every 8 hours  polyethylene glycol 3350 17 Gram(s) Oral at bedtime  senna Syrup 10 milliLiter(s) Oral at bedtime  sodium chloride 0.9%. 1000 milliLiter(s) (83 mL/Hr) IV Continuous <Continuous>  sucralfate suspension 1 Gram(s) Oral every 6 hours    MEDICATIONS  (PRN):  acetaminophen    Suspension .. 650 milliGRAM(s) Oral every 6 hours PRN Temp greater or equal to 38C (100.4F), Mild Pain (1 - 3)  ALBUTerol   0.042% 1.25 milliGRAM(s) Nebulizer four times a day PRN Shortness of Breath and/or Wheezing  aluminum hydroxide/magnesium hydroxide/simethicone Suspension 30 milliLiter(s) Oral every 4 hours PRN Dyspepsia  melatonin 5 milliGRAM(s) Oral at bedtime PRN Insomnia  simethicone drops 40 milliGRAM(s) Oral every 6 hours PRN Gas    Pertinent Labs: CBC Full  -  ( 20 Mar 2020 03:38 )  WBC Count : 20.83 K/uL  RBC Count : 2.78 M/uL  Hemoglobin : 7.6 g/dL  Hematocrit : 26.0 %  Platelet Count - Automated : 639 K/uL  Mean Cell Volume : 93.5 fl  Mean Cell Hemoglobin : 27.3 pg  Mean Cell Hemoglobin Concentration : 29.2 gm/dL  Auto Neutrophil # : x  Auto Lymphocyte # : x  Auto Monocyte # : x  Auto Eosinophil # : x  Auto Basophil # : x  Auto Neutrophil % : x  Auto Lymphocyte % : x  Auto Monocyte % : x  Auto Eosinophil % : x  Auto Basophil % : x    03-20 Na136 mmol/L Glu 170 mg/dL<H> K+ 4.3 mmol/L Cr  0.28 mg/dL<L> BUN 24.0 mg/dL<H> Phos n/a   Alb n/a   PAB 22 mg/dL     Skin: Unstageable pressure injury to coccyx per documentation     Nutrition focused physical exam previously conducted - found signs of malnutrition [ ]absent [x ]present    Subcutaneous fat loss: Severe [x ] Orbital fat pads region, [ x]Buccal fat region, [ ]Triceps region,  [ ]Ribs region    Muscle wasting: Severe [x ]Temples region, [x ]Clavicle region, [x ]Shoulder region, [ ]Scapula region, [ ]Interosseous region,  [ ]thigh region, [ ]Calf region    Estimated Needs:   [ x] no change since previous assessment  [ ] recalculated:     Current Nutrition Diagnosis:Pt remains at high nutrition risk secondary to severe-chronic protein calorie malnutrition related to inability to meet sufficient protein-energy in setting of interstitial lung disease, chronic respiratory failure requiring trach, and skin breakdown as evidenced by severe muscle mass loss in temple, clavicle and shoulder regions and severe fat loss in orbital and buccal region, 8.1% wt loss x ~3 months. Pt currently receiving enteral feeds of Jevity 1.5, goal rate of 60 ml/hr (x20 hrs) provides 1200 ml/day; 1800 kcal, 77g protein, 917 ml free water, and >100% of RDIs for vitamins/minerals.+ prostat 30 ml once daily (tube feeds + supplement will provide 1900 kcal and 92g protein daily). Currently meeting estimated nutrition needs at this time. Recommendations below:       Recommendations:   1) Continue enteral regimen at this time.  2) Consider change MVI to Nephro-Myrna MVI.  3) Continue vit C supplementation daily.   4) Monitor tube feed tolerance.  5) Obtain daily weights to monitor trends.    Monitoring and Evaluation:   [ ] PO intake [x ] Tolerance to diet prescription [X] Weights  [X] Follow up per protocol [X] Labs

## 2020-03-20 NOTE — GOALS OF CARE CONVERSATION - ADVANCED CARE PLANNING - CONVERSATION DETAILS
Family meeting held with Dr Love, Dr Ventura, patients spouse and dgt. Writer was present for goals of care discussion and review of medical concerns as well review of prognosis .Family wishes to continue to address medical issues however was agreeable to pain medication options physicians presented. Patients anxiety and hx of anxiety addressed. Support offered to family coping with patients illness and complications/ concerns for decompensation addressed. Advance directives addressed and spouse nad dgt in agreement to DNR. Palliative to follow.
Goals of care - I had a lengthy conversation with pt, , daughter and mother at bedside.   We discussed the comorbid conditions, hospital care, and prognosis.  I also discussed advanced directives with the patient / family - made aware of limited prognosis if patient were to be intubated or resuscitated, in consideration of age and comorbid conditions.They wished for no advanced directives - that all interventions be pursued.    Total time spent on patient care discussion = 20 minutes.
I attempted to call  on phone but was unable to reach him so I called daughter Eva by phone and spoke to her at length about mom's current condition and overall prognosis. I discussed with her that I am concerned that we will not be able to get her mom to meaningful recovery from this hospitalization. I addressed her chronic underlying conditions, particularly her end stage lung disease as being a big  to this, but more so her very frail, cachectic, and debilitated condition as being the reason that her mother does not have the reserves to be able to fight whatever is going on right now. I discussed with daughter challenges of even arriving at a diagnosis given our limitations due to ventilator settings (inability to send her down for CT head to rule out acute stroke or repeat lung imaging to rule out new infectious process). I did discuss my suspicions that there may be a recurrent infectious process occurring given elevated white blood cell count, fever, lower blood pressures, and obtunded mental state, and I updated daughter that we are starting antibiotics and calling the infectious disease specialist for further evaluation. I spoke to her about starting to prepare themselves as a family that this may be an end of life situation. She understands the overall condition and prognosis and states that if it reaches a point where the doctors here feel there is nothing else that can be done to help her, then they would have to come together as a family and make that difficult decision. I also addressed my concerns that we may be getting close to the point where further testing, poking, and prodding may be causing more suffering without really adding benefit to the patient's care or prognosis. I specifically encouraged daughter to discuss with her father that at this point if her mother were to have a cardiac arrest, it is highly unlikely that CPR would be medically therapeutic given all the interventions we are already doing. She will speak to her father and we will continue to have discussions.      PALLIATIVE MEDICINE COORDINATION OF CARE NOTE FOR ROMIE VIRAMONTES    35 Minutes of non-face-to-face prolonged service provided that relates to (face-to-face) care that has or will occur and ongoing patient management, including one or more of the following:     - Reviewed records from other physicians or other health care professional services, including one or more of the following: other medical records and diagnostic / radiology study results
lengthy family meeting with  Marialuisa and daughter Eva with Dr. Ventura and UNA COSTA We addressed the overall guarded prognosis given her end stage lung disease and up and down nature of her last couple of months. I addressed with them that her biggest becca is her severe cachexia, debility, and she is very high risk for complications and acute decompensation and any one of her complications could result in her death. I discussed with them that given her end stage lung disease and overall clinical condition, if she were to have a cardiac arrest at this point, CPR would unlikely be medically therapeutic.  and daughter agreed, but state they are not ready for ventilator withdrawal or comfort care yet. They do agree they do not want her to suffer and know the overall outcome but want to try to give her as much time of having meaningful interactions with them. We discussed balancing pain and anxiety medications and her alertness, they agree to low dose oxycodone for now.

## 2020-03-20 NOTE — PROGRESS NOTE ADULT - ATTENDING COMMENTS
Meds same, Labs ordered.
time spent reviewing labs, notes, orders, radiographs
Thank you for the opportunity to assist with the care of this patient.   [facility] [phone].
COUNSELING:  Face to face meeting to discuss Advanced Care Planning - Time Spent 30 Minutes.  See goals of care note.      Thank you for the opportunity to assist with the care of this patient.   [facility] [phone].

## 2020-03-20 NOTE — PROVIDER CONTACT NOTE (OTHER) - ACTION/TREATMENT ORDERED:
Dr. Ventura made aware and will reassess patient. No ventilator changes as stated a this time.
RAYSA Alvarado at bedside. AM labs ordered. HR resolved without intervention to 107bpm. 400ml free water via PEG.

## 2020-03-20 NOTE — PROGRESS NOTE ADULT - ASSESSMENT
-VDRF  -Acute on chronic hypercarbic resp failure  -Acute on chronic hypoxic resp failure  -WOB elevated despite vent  -Poor lung compliance; was not tolerating volume control; s/p ptx; difficulty ventilating even with PCV; if we increase Pkpressure will increase risk of barotrauma  -ILD, NSIP element?  -H/o CVA  -Chronic R apical Ptx  -Hx Upper extremity DVT related to catheter which has been removed, current duplex negative bilat UE  thus off  full AC  -Anemic  -Echo with mild pulm HTN    Prognosis poor.         RECC:  Continue vent support with chronic PTX  Follow HCT  On steroids. On atovaquone from [facility]  Follow wbc which is trending higher again but with improved fevers  Off abx per ID but consider re-evaluation given persistent leukocytosis and possible worsening bibasilar opacities  Last echocardiogram with normal LV but mild pulm HTN  Follow labs and ABG given worsening ETCO  Will need to find NH that take PC vent prior to any further adjustments when stable for tx  Prognosis poor.    Palliative f/u.   Pt remains chronically critically ill

## 2020-03-20 NOTE — CHART NOTE - NSCHARTNOTEFT_GEN_A_CORE
Held family meeting with palliative care, Pt's  Marialuisa, and daughter Eva at bedside.  Pt's respiratory status and poor condition were discussed.  They are aware of her likely deterioration and death, but state they are not ready for comfort care measures.  I discussed advanced directives with the patient / family - made aware of limited prognosis if patient were to be intubated or resuscitated, in consideration of age and comorbid conditions.  They agreed with DNR status.    ABG reviewed Blood Gas Profile - Arterial (03.20.20 @ 14:39)    pH, Arterial: 7.27    pCO2, Arterial: 107: TYPE:(C=Critical, N=Notification, A=Abnormal) c  TESTS: _abg high co2  DATE/TIME CALLED: _03/20/20 14:43  CALLED TO: _dr grijalva  READ BACK (2 Patient Identifiers)(Y/N): y_  READ BACK VALUES (Y/N): _y  CALLED BY: _la mmHg    pO2, Arterial: 68 mmHg    HCO3, Arterial: 43 mmoL/L    Base Excess, Arterial: 19.2 mmol/L    Oxygen Saturation, Arterial: 91 %    FIO2, Arterial: 36    Blood Gas Comments Arterial: ip 40 it 1.0 rr 28 peep 5 fio2 36    Blood Gas Source Arterial: Arterial      Discussed with Dr. Richards.  No further recommendations at this point as pt as risk for barotrauma due to poor compliance, h/o PNX.

## 2020-03-20 NOTE — PROGRESS NOTE ADULT - SUBJECTIVE AND OBJECTIVE BOX
PULMONARY PROGRESS NOTE      [id number]    Patient is a 60y old  Female who presents with a chief complaint of Acute respiratory distress (20 Mar 2020 12:38)      INTERVAL HPI/OVERNIGHT EVENTS: On vent via trach. Appears sob despite vent. Not responsive.     MEDICATIONS  (STANDING):  ascorbic acid 500 milliGRAM(s) Oral daily  atovaquone Suspension 1500 milliGRAM(s) Oral daily  azithromycin  IVPB 500 milliGRAM(s) IV Intermittent every 24 hours  chlorhexidine 0.12% Liquid 15 milliLiter(s) Oral Mucosa every 12 hours  collagenase Ointment 1 Application(s) Topical daily  enoxaparin Injectable 40 milliGRAM(s) SubCutaneous daily  famotidine    Tablet 20 milliGRAM(s) Oral daily  ferrous    sulfate 325 milliGRAM(s) Oral two times a day  methylPREDNISolone sodium succinate Injectable 40 milliGRAM(s) IV Push every 8 hours  multivitamin 1 Tablet(s) Oral daily  OXcarbazepine 600 milliGRAM(s) Oral two times a day  piperacillin/tazobactam IVPB.. 3.375 Gram(s) IV Intermittent every 8 hours  polyethylene glycol 3350 17 Gram(s) Oral at bedtime  propranolol 10 milliGRAM(s) Oral every 12 hours  senna Syrup 10 milliLiter(s) Oral at bedtime  sodium chloride 0.9%. 1000 milliLiter(s) (83 mL/Hr) IV Continuous <Continuous>  sucralfate suspension 1 Gram(s) Oral every 6 hours      MEDICATIONS  (PRN):  acetaminophen    Suspension .. 650 milliGRAM(s) Oral every 6 hours PRN Temp greater or equal to 38C (100.4F), Mild Pain (1 - 3)  ALBUTerol   0.042% 1.25 milliGRAM(s) Nebulizer four times a day PRN Shortness of Breath and/or Wheezing  aluminum hydroxide/magnesium hydroxide/simethicone Suspension 30 milliLiter(s) Oral every 4 hours PRN Dyspepsia  melatonin 5 milliGRAM(s) Oral at bedtime PRN Insomnia  simethicone drops 40 milliGRAM(s) Oral every 6 hours PRN Gas      Allergies    IV Contrast (Anaphylaxis)  shellfish. (Anaphylaxis)    Intolerances    lorazepam (Other (Severe))      PAST MEDICAL & SURGICAL HISTORY:  Interstitial lung disease: on home o2 prn  NHL (non-Hodgkin's lymphoma): Agem 43 sp chemo/rt/stem cell  Transient cerebral ischemia, unspecified type  Mitral prolapse  History of tonsillectomy  History of appendectomy             REVIEW OF SYSTEMS:    not available    Vital Signs Last 24 Hrs  T(C): 37.1 (20 Mar 2020 11:41), Max: 37.4 (19 Mar 2020 16:36)  T(F): 98.8 (20 Mar 2020 11:41), Max: 99.3 (19 Mar 2020 16:36)  HR: 108 (20 Mar 2020 12:23) (90 - 124)  BP: 98/57 (20 Mar 2020 12:00) (87/53 - 116/55)  BP(mean): 75 (20 Mar 2020 12:00) (63 - 83)  RR: 47 (20 Mar 2020 12:00) (38 - 49)  SpO2: 92% (20 Mar 2020 12:23) (88% - 96%)    PHYSICAL EXAMINATION:    GENERAL: The patient is awake and alert in no apparent distress.     HEENT: Head is normocephalic and atraumatic.  Mucous membranes are moist.    NECK: trach    LUNGS: rales b/l, no wheeze,  respirations labored    HEART: Regular rate and rhythm      ABDOMEN: Soft, nontender, and nondistended.      EXTREMITIES: Without any cyanosis, clubbing, rash, lesions or edema.    NEUROLOGIC: Grossly intact.    LABS:                        7.6    20.83 )-----------( 639      ( 20 Mar 2020 03:38 )             26.0     03-20    136  |  86<L>  |  24.0<H>  ----------------------------<  170<H>  4.3   |  40.0<H>  |  0.28<L>    Ca    8.9      20 Mar 2020 03:38  Mg     2.2     03-19              Blood Gas Profile - Arterial (03.19.20 @ 10:55)    pH, Arterial: 7.35    pCO2, Arterial: 94: TYPE:(C=Critical, N=Notification, A=Abnormal) c  TESTS: _co2  DATE/TIME CALLED: _03/19/20 10:58  CALLED TO: candace callejas  READ BACK (2 Patient Identifiers)(Y/N): _y  READ BACK VALUES (Y/N): _y  CALLED BY: _lr mmHg    pO2, Arterial: 72 mmHg    HCO3, Arterial: 48 mmoL/L    Base Excess, Arterial: 23.0 mmol/L    Oxygen Saturation, Arterial: 95 %    FIO2, Arterial: 32    Blood Gas Comments Arterial: ac pc 28/pip40/peep 5/.32    Blood Gas Source Arterial: Arterial                          Procalcitonin, Serum: 0.12 ng/mL (03-20-20 @ 03:38)           RADIOLOGY & ADDITIONAL STUDIES:  < from: Xray Chest 1 View- PORTABLE-Routine (03.19.20 @ 13:45) >     EXAM:  XR CHEST PORTABLE ROUTINE 1V                          PROCEDURE DATE:  03/19/2020          INTERPRETATION:  CLINICAL INFORMATION: resp failure. ILD. ADMDIAG1: J84.9 INTERSTITIAL PULMONARY DISEASE, UNSPECIFIED/.     EXAM: AP chest.    COMPARISON: Prior radiograph dated 3/17/2020. CT chest 3/11/2020.    FINDINGS:  There is a tracheostomy.  Bilateral peripheral reticular opacities suggesting pulmonary fibrosis.  Small right pneumothorax again seen.  The cardiomediastinal silhouette is magnified due to AP technique.      IMPRESSION: Pulmonary fibrosis. Small right pneumothorax, unchanged.                SHIRA EVERETT   This document has been electronically signed. Mar 19 2020  1:51PM    < end of copied text >

## 2020-03-21 VITALS — OXYGEN SATURATION: 75 % | RESPIRATION RATE: 14 BRPM | HEART RATE: 40 BPM

## 2020-03-21 LAB
BASE EXCESS BLDA CALC-SCNC: 17.9 MMOL/L — HIGH (ref -3–3)
BLOOD GAS COMMENTS ARTERIAL: SIGNIFICANT CHANGE UP
GAS PNL BLDA: SIGNIFICANT CHANGE UP
HCO3 BLDA-SCNC: 42 MMOL/L — HIGH (ref 20–26)
HOROWITZ INDEX BLDA+IHG-RTO: SIGNIFICANT CHANGE UP
PCO2 BLDA: >108 MMHG — CRITICAL HIGH (ref 35–45)
PH BLDA: 7.17 — CRITICAL LOW (ref 7.35–7.45)
PO2 BLDA: 68 MMHG — LOW (ref 83–108)
SAO2 % BLDA: 91 % — LOW (ref 95–99)

## 2020-03-21 PROCEDURE — 82570 ASSAY OF URINE CREATININE: CPT

## 2020-03-21 PROCEDURE — 93005 ELECTROCARDIOGRAM TRACING: CPT

## 2020-03-21 PROCEDURE — 82947 ASSAY GLUCOSE BLOOD QUANT: CPT

## 2020-03-21 PROCEDURE — 94640 AIRWAY INHALATION TREATMENT: CPT

## 2020-03-21 PROCEDURE — 71250 CT THORAX DX C-: CPT

## 2020-03-21 PROCEDURE — 94003 VENT MGMT INPAT SUBQ DAY: CPT

## 2020-03-21 PROCEDURE — 96365 THER/PROPH/DIAG IV INF INIT: CPT

## 2020-03-21 PROCEDURE — 82436 ASSAY OF URINE CHLORIDE: CPT

## 2020-03-21 PROCEDURE — 84244 ASSAY OF RENIN: CPT

## 2020-03-21 PROCEDURE — 82330 ASSAY OF CALCIUM: CPT

## 2020-03-21 PROCEDURE — 86901 BLOOD TYPING SEROLOGIC RH(D): CPT

## 2020-03-21 PROCEDURE — 84134 ASSAY OF PREALBUMIN: CPT

## 2020-03-21 PROCEDURE — 84133 ASSAY OF URINE POTASSIUM: CPT

## 2020-03-21 PROCEDURE — 87486 CHLMYD PNEUM DNA AMP PROBE: CPT

## 2020-03-21 PROCEDURE — 87581 M.PNEUMON DNA AMP PROBE: CPT

## 2020-03-21 PROCEDURE — 87070 CULTURE OTHR SPECIMN AEROBIC: CPT

## 2020-03-21 PROCEDURE — 94760 N-INVAS EAR/PLS OXIMETRY 1: CPT

## 2020-03-21 PROCEDURE — 84550 ASSAY OF BLOOD/URIC ACID: CPT

## 2020-03-21 PROCEDURE — 82803 BLOOD GASES ANY COMBINATION: CPT

## 2020-03-21 PROCEDURE — 83935 ASSAY OF URINE OSMOLALITY: CPT

## 2020-03-21 PROCEDURE — 84295 ASSAY OF SERUM SODIUM: CPT

## 2020-03-21 PROCEDURE — 99291 CRITICAL CARE FIRST HOUR: CPT | Mod: 25

## 2020-03-21 PROCEDURE — 93970 EXTREMITY STUDY: CPT

## 2020-03-21 PROCEDURE — 81001 URINALYSIS AUTO W/SCOPE: CPT

## 2020-03-21 PROCEDURE — 87449 NOS EACH ORGANISM AG IA: CPT

## 2020-03-21 PROCEDURE — 94799 UNLISTED PULMONARY SVC/PX: CPT

## 2020-03-21 PROCEDURE — 36600 WITHDRAWAL OF ARTERIAL BLOOD: CPT

## 2020-03-21 PROCEDURE — 83690 ASSAY OF LIPASE: CPT

## 2020-03-21 PROCEDURE — 86850 RBC ANTIBODY SCREEN: CPT

## 2020-03-21 PROCEDURE — 71045 X-RAY EXAM CHEST 1 VIEW: CPT

## 2020-03-21 PROCEDURE — 84300 ASSAY OF URINE SODIUM: CPT

## 2020-03-21 PROCEDURE — 84145 PROCALCITONIN (PCT): CPT

## 2020-03-21 PROCEDURE — 85014 HEMATOCRIT: CPT

## 2020-03-21 PROCEDURE — 82088 ASSAY OF ALDOSTERONE: CPT

## 2020-03-21 PROCEDURE — 87798 DETECT AGENT NOS DNA AMP: CPT

## 2020-03-21 PROCEDURE — 82272 OCCULT BLD FECES 1-3 TESTS: CPT

## 2020-03-21 PROCEDURE — 87086 URINE CULTURE/COLONY COUNT: CPT

## 2020-03-21 PROCEDURE — 84484 ASSAY OF TROPONIN QUANT: CPT

## 2020-03-21 PROCEDURE — 94002 VENT MGMT INPAT INIT DAY: CPT

## 2020-03-21 PROCEDURE — 84132 ASSAY OF SERUM POTASSIUM: CPT

## 2020-03-21 PROCEDURE — 83605 ASSAY OF LACTIC ACID: CPT

## 2020-03-21 PROCEDURE — 83880 ASSAY OF NATRIURETIC PEPTIDE: CPT

## 2020-03-21 PROCEDURE — 86900 BLOOD TYPING SEROLOGIC ABO: CPT

## 2020-03-21 PROCEDURE — 36415 COLL VENOUS BLD VENIPUNCTURE: CPT

## 2020-03-21 PROCEDURE — 85027 COMPLETE CBC AUTOMATED: CPT

## 2020-03-21 PROCEDURE — 87040 BLOOD CULTURE FOR BACTERIA: CPT

## 2020-03-21 PROCEDURE — 83735 ASSAY OF MAGNESIUM: CPT

## 2020-03-21 PROCEDURE — 94770: CPT

## 2020-03-21 PROCEDURE — 87633 RESP VIRUS 12-25 TARGETS: CPT

## 2020-03-21 PROCEDURE — 80048 BASIC METABOLIC PNL TOTAL CA: CPT

## 2020-03-21 PROCEDURE — 82435 ASSAY OF BLOOD CHLORIDE: CPT

## 2020-03-21 PROCEDURE — 80053 COMPREHEN METABOLIC PANEL: CPT

## 2020-03-21 PROCEDURE — 82962 GLUCOSE BLOOD TEST: CPT

## 2020-03-21 RX ORDER — SODIUM BICARBONATE 1 MEQ/ML
0.07 SYRINGE (ML) INTRAVENOUS
Qty: 50 | Refills: 0 | Status: DISCONTINUED | OUTPATIENT
Start: 2020-03-21 | End: 2020-03-21

## 2020-03-21 RX ORDER — SODIUM CHLORIDE 9 MG/ML
500 INJECTION INTRAMUSCULAR; INTRAVENOUS; SUBCUTANEOUS ONCE
Refills: 0 | Status: COMPLETED | OUTPATIENT
Start: 2020-03-21 | End: 2020-03-21

## 2020-03-21 RX ORDER — MORPHINE SULFATE 50 MG/1
2 CAPSULE, EXTENDED RELEASE ORAL
Qty: 100 | Refills: 0 | Status: DISCONTINUED | OUTPATIENT
Start: 2020-03-21 | End: 2020-03-21

## 2020-03-21 RX ORDER — MIDODRINE HYDROCHLORIDE 2.5 MG/1
5 TABLET ORAL ONCE
Refills: 0 | Status: COMPLETED | OUTPATIENT
Start: 2020-03-21 | End: 2020-03-21

## 2020-03-21 RX ORDER — MORPHINE SULFATE 50 MG/1
4 CAPSULE, EXTENDED RELEASE ORAL ONCE
Refills: 0 | Status: DISCONTINUED | OUTPATIENT
Start: 2020-03-21 | End: 2020-03-21

## 2020-03-21 RX ADMIN — Medication 1 GRAM(S): at 01:03

## 2020-03-21 RX ADMIN — MORPHINE SULFATE 2 MG/HR: 50 CAPSULE, EXTENDED RELEASE ORAL at 02:51

## 2020-03-21 RX ADMIN — MORPHINE SULFATE 4 MILLIGRAM(S): 50 CAPSULE, EXTENDED RELEASE ORAL at 03:11

## 2020-03-21 RX ADMIN — MORPHINE SULFATE 2 MG/HR: 50 CAPSULE, EXTENDED RELEASE ORAL at 02:57

## 2020-03-21 RX ADMIN — MORPHINE SULFATE 4 MILLIGRAM(S): 50 CAPSULE, EXTENDED RELEASE ORAL at 03:07

## 2020-03-21 RX ADMIN — SODIUM CHLORIDE 1500 MILLILITER(S): 9 INJECTION INTRAMUSCULAR; INTRAVENOUS; SUBCUTANEOUS at 01:03

## 2020-03-21 RX ADMIN — MIDODRINE HYDROCHLORIDE 5 MILLIGRAM(S): 2.5 TABLET ORAL at 01:05

## 2020-03-21 NOTE — DISCHARGE NOTE FOR THE EXPIRED PATIENT - OTHER SIGNIFICANT FINDINGS
Physical Exam:  Eyes: pupils non-responsive, fixed and dilated, absent corneal reflex  Cardio: no auscultated heart sounds, absent carotid pulses  Resp: no auscultated breath sounds, no rise and fall of chest wall   Extremities: no palpable radial or pedal pulses  Neuro: unresponsive to verbal/painful stimuli and sternal rub  Pt  2020.

## 2020-03-21 NOTE — DISCHARGE NOTE FOR THE EXPIRED PATIENT - SECONDARY DIAGNOSIS.
Ventilatory failure Interstitial lung disease Hypercapnic respiratory failure, chronic Hypoxemic respiratory failure, chronic Pneumothorax, unspecified type

## 2020-03-21 NOTE — DISCHARGE NOTE FOR THE EXPIRED PATIENT - HOSPITAL COURSE
This is a 60 y/o female with history of severe restrictive interstitial lung disease, s/p resp failure s/p trach , ventilator dependent, s/p PEA arrest (), RLE DVT and RUE occlusive thrombus, toxic metabolic encephalopathy, severe sepsis secondary to serratia PNA, unspecified pleural/parenchymal bronchiectatic process with pulmonary nodules for which she has opposed steroid therapy (prior), spontaneous right-sided PTX (), stage Peg NHL (s/p chemo/XRT and SCT received ' at [facility] [place]) 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 Patient was discharged to [facility], on the day of arrival to [facility], from [facility]. Upon arrival to [facility] pt was placed on volume control ventilation at which time she immediately desaturated, became unresponsive and in acute respiratory distress. History obtained from patient daughter at bedside and patient chart.(11-Mar-2020) Admitted for evaluation, seen by CT Surgery, Pulmonary and critical care.  She has remained on pressure control ventilation, tachypneic and tachycardic.  Per pulmonary, her tidal volumes are appropriate. Poor prognosis with little hope for improvement. ( 20-Mar-2020) Held family meeting with palliative care, Pt's  Marialuisa, and daughter Eva at bedside. Pt's respiratory status and poor condition were discussed. They are aware of her likely deterioration and death, but state they are not ready for comfort care measures. Pt with worsening ABG and hypotensive despite fluid resuscitation and oral pressor. Family contacted and updated on pt status, pt declining and without signs of improvement. Family decided on comfort care measures only. Pt  2020.

## 2020-03-24 LAB
CULTURE RESULTS: SIGNIFICANT CHANGE UP
CULTURE RESULTS: SIGNIFICANT CHANGE UP
RENIN PLAS-CCNC: 3.63 NG/ML/HR — SIGNIFICANT CHANGE UP (ref 0.17–5.38)
SPECIMEN SOURCE: SIGNIFICANT CHANGE UP
SPECIMEN SOURCE: SIGNIFICANT CHANGE UP

## 2020-04-02 PROBLEM — G40.109 FOCAL EPILEPSY: Status: ACTIVE | Noted: 2019-09-06

## 2020-08-11 NOTE — PROGRESS NOTE ADULT - ASSESSMENT
[FreeTextEntry1] : \par 1.  EKG provided by the patient from his preoperative assessment reveals sinus rhythm at 87 bpm.  Normal intervals. No evidence of ischemia. \par \par 2.  Hypertension:  Blood pressure well controlled at this time on current medications.  Patient advised on a low-salt diet.  \par \par 3.  Hypercholesterolemia:  Patient recently underwent lipid profile through his PCP’s office and states all was normal.  Advised on a low-fat / low-cholesterol diet.  \par \par 4.  Left shoulder surgery:  Patient may proceed without cardiac precautions.  If clinically stable, follow up office visit here 12 months. \par  60 yo female s/p suspected CVA vs. encephalitis admitted to rehab with left-sided weakness and functional deficits.       #r/o CVA  ASA continues c GI ppx    #Leukocytosis  afebrile, repeat labs, ID eval considered? Medicine in.     #tachycardia  -metoprolol BID, monitor closely    #ILD  -pulmicort, spiriva  -on home O2 PRN.     #Seizure  Continue Depakote, seizure ppx    #rectal pain/hx of bleed  GI evaluation of nausea and rectal pain, bowel regimen for constipation. Spoke c Dr Singh-ok to start DVT ppx c Lovenox.     #back pain  Tylenol and lidocaine patch.    #debility  Lovenox SQ cleared by GI, Nutrition eval.           Precautions:           fall                                                                       Diet: regular    DVT Prophylaxis:      TEDs                                                                 Medical Prognosis: guarded    Prescreen Comparison: I have reviewed the prescreen information and I found no relevant changes between the preadmission  screening and my post admission evaluation.     Expected Therapy:   P.T.   1     hrs/day           O. T.   1   hrs/day           S.L.P.  1    hrs/day                    P&O  eval                                                 Excpected Frequency: 5 days/7 day period    Rehab Potential:               fair/poor                            Estimated Disposition:        [facility]:   14           days      Rationale For Inpatient Rehab Admission- Patient demonstrates the following:     [X] Medically appropriate for rehabilitation admission   [X] Has attainable rehab goals with an appropriate discharge plan  [X] Has rehabilitation potential (expected to make significant improvement within a reasonable period of time)  [X] Requires close medical management by a rehab physician, rehab nursing care and comprehensive interdisciplinary team (including         PT, OT, SLP and/or prosthetics and orthotics) 58 yo female s/p suspected CVA vs. encephalitis admitted to rehab with left-sided weakness and functional deficits.       #r/o CVA  ASA continues c GI ppx    #Leukocytosis  afebrile, repeat labs, ID eval considered? Medicine is following.    #tachycardia  -metoprolol BID, monitor closely    #ILD  -pulmicort, spiriva  -on home O2 PRN.     #Seizure  Continue Depakote, seizure ppx    #rectal pain/hx of bleed  GI evaluation of nausea and rectal pain, bowel regimen for constipation. Spoke c Dr Singh-ok to start DVT ppx c Lovenox.     #back pain  Tylenol and lidocaine patch.    #debility  Lovenox SQ cleared by GI, Nutrition eval.           Precautions:           fall                                                                       Diet: regular    DVT Prophylaxis:      TEDs                                                                 Medical Prognosis: guarded 58 yo female s/p suspected CVA vs. encephalitis admitted to rehab with left-sided weakness and functional deficits.       #r/o CVA  ASA continues c GI ppx    #Leukocytosis  afebrile, repeat labs, ID eval, Medicine is following. Patient refused CXR as part of septic workup. Need of CXR explained at length, patient stated "I want to save my lungs for other things, XR is bad for you'.     #tachycardia  -metoprolol BID, monitor closely    #ILD  -pulmicort, spiriva  -on home O2 PRN.     #Seizure  Continue Depakote, seizure ppx    #rectal pain/hx of bleed  GI evaluation of nausea and rectal pain, bowel regimen for constipation. Spoke c Dr Singh-ok to start DVT ppx c Lovenox.     #back pain  Tylenol and lidocaine patch.    #debility  Lovenox SQ cleared by GI, Nutrition eval.           Precautions:           fall                                                                       Diet: regular    DVT Prophylaxis:      TEDs                                                                 Medical Prognosis: guarded

## 2020-09-15 NOTE — PATIENT PROFILE ADULT - FUNCTIONAL SCREEN CURRENT LEVEL: DRESSING, MLM
PT ASSESSMENT COMPLETED.  HE IS ON THE CPM AT THIS TIME.  HEART SOUND REGULAR
NO MURMUR HEARD.  LUNGS SOUNDED CLEAR.  BOWEL SOUNDS HEARD IN ALL QUADS.  IV
IS IN THE LEFT WRIST INFUSING AT 50 ML/HR.  ARETHA HOSE IN PLACE, SCD ON THE
RIGHT LEG IS ON.  LEFT KNEE HAS BLOODY DRAINAGE FROM THE INCISION SITE THAT
HAS DRAINED ON THE ACE WRAP ON HIS LEG.  WATCHING CLOSESLY.  PT HAD TO BE
TAKEN OFF THE CPM TO GO TO THE BR.  HE GETS ON HIS WALKER AND GOES TO THE
BATHROOM.  CPM CONT. WHEN HE GOT BACK IN BED.  PT STATES HE HAS NO PAIN AT HIS
INCISION SITE. SR ELEVATED X2.  CALL LIGHT IS CLOSE AT HAND IF PT NEEDS TO
CALL. 4 = completely dependent

## 2020-10-27 NOTE — DISCHARGE NOTE PROVIDER - NSDCCPGOAL_GEN_ALL_CORE_FT
To get better and follow your care plan as instructed. <<----- Click to add NO significant Past Surgical History

## 2020-11-05 NOTE — PROGRESS NOTE ADULT - SUBJECTIVE AND OBJECTIVE BOX
CHIEF COMPLAINT/INTERVAL HISTORY:  Pt. seen and evaluated for acute hypoxic and hypercapnic respiratory failure.  Pt. a little bit more responsive today.  Still lethargic.  On BiPAP.      REVIEW OF SYSTEMS:  No fever or CP.  Rest of ROS not obtained due to lethargy.     Vital Signs Last 24 Hrs  T(C): 36.7 (20 Jan 2020 07:26), Max: 37 (19 Jan 2020 20:13)  T(F): 98 (20 Jan 2020 07:26), Max: 98.6 (19 Jan 2020 20:13)  HR: 114 (20 Jan 2020 09:00) (85 - 120)  BP: 117/68 (20 Jan 2020 09:00) (84/52 - 159/83)  BP(mean): 85 (20 Jan 2020 09:00) (62 - 108)  RR: 33 (20 Jan 2020 09:00) (22 - 50)  SpO2: 97% (20 Jan 2020 09:00) (94% - 100%)    PHYSICAL EXAM:  GENERAL: lethargic  HEENT: hearing normal, conjunctiva and sclera clear, BiPAP mask on   Chest: Diminished BS bilaterally, no wheezing  CV: S1S2, RRR,   GI: soft, +BS, NT/ND  Musculoskeletal: no edema, contracture of left wrist  Psychiatric: lethargic  Skin: warm and dry    LABS:                        11.6   15.59 )-----------( 513      ( 20 Jan 2020 05:26 )             37.5     01-20    135  |  87<L>  |  16  ----------------------------<  107<H>  4.0   |  >45<HH>  |  0.44<L>    Ca    9.0      20 Jan 2020 05:26  Phos  2.7     01-20  Mg     2.1     01-20      Assessment and Plan:  -Septic shock and acute hypoxic and hypercapnic respiratory failure 2/2 hospital acquired pneumonia, right pneumothorax, and ILD:  Shock has resolved.  Off vasopressors.  Completed course of IV antibiotics.  s/p removal of chest tube on 1/16.  Continue Prednisone 40mg PO daily, Spiriva inh daily, Albuterol Neb Q6h PRN, and BiPAP support.  ID, Pulmonary, and Intensivist f/u  -Colitis:  Completed course of IV antibiotics.  Continue mesalamine 1000mg WA QHS.  GI f/u  -Nausea and GERD:  Zofran 4mg PO Q12h PRN, Simethicone 80mg PO BID PRN, Carafate 1gm PO BID, and Protonix 40mg IV daily.   -Seizure disorder:  continue Trileptal 300mg PO BID.  Neurology f/u  -Thrush:  continue nystatin swish and swallow four times a day  -VTE ppx: Lovenox 40mg SQ daily Statement Selected

## 2021-04-28 NOTE — PROGRESS NOTE ADULT - SUBJECTIVE AND OBJECTIVE BOX
[facility] [address] [place] [address]  [phone]  FAX [phone]    This visit was performed via live two-way video with patient's verbal consent.   Clinician Location: Home.  Patient Location: Home.    Skinny is in [place] and identity has been established.     He was informed that consent to treat includes permission to submit charges to the applicable insurance on file. Skinny was advised regarding the potential risk inherent in video visits, as the assessment may be limited due to what can be seen on the screen which potentially results in an incomplete assessment; as well as either of us may discontinue the video visit if it is.      PROGRESS NOTE    DATE:  4/28/2021  NAME:  Skinny Perez  YOB: 1990  AGE:  30 year old  PRIMARY CARE PROVIDER:  Duglas Mar DO     *pt accompanied by wife Namrata    Total time spent: 17    Reason for visit: anxiety, mood    S:  Patient cites overall mood is \"7/10\".  Cites sleep is \"decent\".  Cites NOT maintaining interests.  Denies guilt.  Denies hopelessness/helplessness/worthlessness or anhedonia or psychomotor retardation.  Cites \"alright\" energy.  Cites concentrating well.  Cites appetite is \"okay\".  Denies any recent crying spells.  Denies irritability.  Cites \"fair\" motivation levels.    Pt denies panic attacks or somatic anxiety symptoms.    Pt denies continuing breakthrough generalized anxiety symptoms.    Pt cites ADHD sxs are \"good\".   Pt denies chest pain, palpitations, tremors, tics, headaches, insomnia, appetite changes, nausea or sweating related to stimulant. Pt also denies increased anxiety or feelings of a \"high\" or \"buzz\" on the medication.    SAFETY/RISK ASSESSMENT:  Pt does not have access to guns.  COLUMBIA-SUICIDE SEVERITY RATING SCALE     In the Past Month   Yes \ No      1) Have you wished you were dead or wished you could go to sleep and not wake up?       No      2) Have you actually had any  Date/Time Patient Seen:  		  Referring MD:   Data Reviewed	       Patient is a 59y old  Female who presents with a chief complaint of pneumothorax, colitis, UTI (27 Dec 2019 15:00)      Subjective/HPI     PAST MEDICAL & SURGICAL HISTORY:  Interstitial lung disease: on home o2 prn  NHL (non-Hodgkin's lymphoma): Agem 43 sp chemo/rt/stem cell  Transient cerebral ischemia, unspecified type  Mitral prolapse  Pulmonary disease  History of tonsillectomy  History of appendectomy        Medication list         MEDICATIONS  (STANDING):  cefTRIAXone   IVPB 1000 milliGRAM(s) IV Intermittent every 24 hours  enoxaparin Injectable 40 milliGRAM(s) SubCutaneous daily  influenza   Vaccine 0.5 milliLiter(s) IntraMuscular once  lactobacillus acidophilus 1 Tablet(s) Oral three times a day with meals  OXcarbazepine 300 milliGRAM(s) Oral two times a day  pantoprazole    Tablet 40 milliGRAM(s) Oral before breakfast  propranolol 20 milliGRAM(s) Oral three times a day  senna 2 Tablet(s) Oral at bedtime  tiotropium 18 MICROgram(s) Capsule 1 Capsule(s) Inhalation daily    MEDICATIONS  (PRN):  ondansetron Injectable 4 milliGRAM(s) IV Push every 8 hours PRN Nausea and/or Vomiting         Vitals log        ICU Vital Signs Last 24 Hrs  T(C): 36.6 (28 Dec 2019 07:00), Max: 37 (27 Dec 2019 20:45)  T(F): 97.9 (28 Dec 2019 07:00), Max: 98.6 (27 Dec 2019 20:45)  HR: 70 (28 Dec 2019 07:00) (70 - 98)  BP: 93/62 (28 Dec 2019 07:00) (87/56 - 112/74)  BP(mean): --  ABP: --  ABP(mean): --  RR: 16 (28 Dec 2019 07:00) (16 - 18)  SpO2: 98% (28 Dec 2019 07:00) (92% - 100%)           Input and Output:  I&O's Detail    27 Dec 2019 07:01  -  28 Dec 2019 07:00  --------------------------------------------------------  IN:    Oral Fluid: 650 mL    sodium chloride 0.9%: 225 mL    Solution: 50 mL  Total IN: 925 mL    OUT:    Voided: 250 mL  Total OUT: 250 mL    Total NET: 675 mL          Lab Data                        11.2   8.07  )-----------( 426      ( 27 Dec 2019 06:16 )             35.5     12-27    144  |  106  |  11  ----------------------------<  111<H>  3.1<L>   |  32<H>  |  0.60    Ca    8.7      27 Dec 2019 06:16    TPro  6.1  /  Alb  2.5<L>  /  TBili  0.3  /  DBili  x   /  AST  21  /  ALT  27  /  AlkPhos  104  12-27            Review of Systems	      Objective     Physical Examination    head at  heart s1s2  lung dec BS  abd soft  on o2 support  awake  verbal  pain in the back reported      Pertinent Lab findings & Imaging      Villatoro:  NO   Adequate UO     I&O's Detail    27 Dec 2019 07:01  -  28 Dec 2019 07:00  --------------------------------------------------------  IN:    Oral Fluid: 650 mL    sodium chloride 0.9%: 225 mL    Solution: 50 mL  Total IN: 925 mL    OUT:    Voided: 250 mL  Total OUT: 250 mL    Total NET: 675 mL               Discussed with:     Cultures:	  Culture Results:   GI PCR Results: NOT detected  *******Please Note:*******  GI panel PCR evaluates for:  Campylobacter, Plesiomonas shigelloides, Salmonella,  Vibrio, Yersinia enterocolitica, Enteroaggregative  Escherichia coli (EAEC), Enteropathogenic E.coli (EPEC),  Enterotoxigenic E. coli (ETEC) lt/st, Shiga-like  toxin-producing E. coli (STEC) stx1/stx2,  Shigella/ Enteroinvasive E. coli (EIEC), Cryptosporidium,  Cyclospora cayetanensis, Entamoeba histolytica,  Giardia lamblia, Adenovirus F 40/41, Astrovirus,  Norovirus GI/GII, Rotavirus A, Sapovirus (12-27 @ 20:58)        Radiology thoughts about killing yourself?      No      3) Have you thought about how you might do this?       No      4) Have you had any intention of acting on these thoughts of killing yourself, as opposed to you have the thoughts but you definitely would not act on them?       No      5) Have you started to work out or worked out the details of how to kill yourself?   Do you intend to carry out this plan?       No     In the Past 3 Months       6) Have you done anything, started to do anything, or prepared to do anything to end your life?       No   In your entire lifetime, how many times have you done any of these things? \"Nope, not at all\"   Do you currently have or have you made any recent preparatory actions towards harming others? \"No.\"  Do you have currently or have you had recently any intent or plan to harm others? \"No”    Do you agree to contact emergency services if suicidal or violent thoughts arise? \"Yes.\"  Patient denies any of the following behaviors: recent talking/speech about suicide,  wondering aloud about death, acquiring medications or other lethal instruments, writing letter(s) to loved ones, giving away belongings, updating will)   -Patient currently cites numerous protective factors: wife, childhood friends, mother, father, brothers, sisters, positive social support, strong connections to family and community support, significant other, life satisfaction, reality testing ability, positive coping skills, positive problem-solving skills, atheism, responsibility to family, cat, snake, fear of death or dying due to pain and suffering, attachment to life, embedded in protective social network, commitment to live, patient is future oriented (\"my wife delivering\"), patient cites feeling optimistic about the future, patient feels that life has meaning and cites having a respect for life and a wish to live  -patient is not currently isolated or alone, patient denies any current severe hopelessness,  helplessness or worthlessness or anhedonia, no command hallucinations, no impulsivity displayed at present, verbal contract for safety: yes, based on above risk assessment and absence of risk factors and presence of protective factors, writer is able to conclude that patient is not at risk of suicidality or at risk of harm to others.  -denies any current active or passive homicidal ideations/violent ideations towards anyone in specific or towards strangers or the general public  -denies any access to weapons  -pt has skills in problem solving, conflict resolution and nonviolent handling of disputes    Abuse/Trauma:   -pt denies history of emotional/verbal or physical abuse, denies any history of sexual abuse  -pt has never been exposed to domestic violence between parents, community violence or  violence  -pt denies any current abuse or any abuse within the past 6 months directed towards patient (physical, emotional or sexual)  -patient denies other types of traumatic event exposure    Sexual Health: denies sexual dysfunction issues    PS (Psychosocial) Stressors:  -mild back pain--stable    Past Psychiatric History:   -pt denies any previous IP (Inpatient) admissions  -denies history of partial hospitalization  -denies history of IOP (Intensive Outpatient Program)  -outpatient treatment: admits to psychiatry visits  -denies history of involvement in outpatient AA (Alcoholics Anonymous)/12st programs, denies history of receiving outpatient AODA treatment, denies history of receiving inpatient AODA treatment  -medication trials: denies major ASE to previous psychotropics, venlafaxine (\"didn't like the way it made me feel\"), gabapentin (\"headaches\")  -therapy encounters: yes    Past Medical History:  -pt denies history of head trauma or head injuries or seizures  -pt denies previous cardiac history  -any acute or chronic pain? 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 I have obtained and reviewed medications and allergy information with the patient/caregiver, No discrepancies were identified. Patient was educated on the importance of maintaining and sharing this information with all healthcare providers  -pt denies any over-the-counter/herbals at present other than below  Current Outpatient Medications   Medication Sig Dispense Refill   • Forfivo  MG extended-release  tablet Take 1 tablet by mouth daily. 30 tablet 1   • hydrOXYzine (ATARAX) 25 MG tablet Take 0.5-3 tablets by mouth 3 times daily as needed for Anxiety. 75 tablet 1   • sertraline (Zoloft) 50 MG tablet Take 1 tablet by mouth daily. Do not start before April 28, 2021. 30 tablet 11   • amphetamine-dextroamphetamine (Adderall) 20 MG tablet Take 1 tablet by mouth 2 times daily. Do not start before March 18, 2021. 60 tablet 0   • amphetamine-dextroamphetamine (Adderall) 20 MG tablet Take 1 tablet by mouth 2 times daily. Do not start before February 3, 2021. 60 tablet 0   • albuterol 108 (90 Base) MCG/ACT inhaler Inhale 2 puffs  into the lungs every 4 hours as needed for Shortness of Breath or Wheezing. 1 Inhaler 0   • ibuprofen (MOTRIN) 600 MG tablet Take 1 tablet by mouth every 6 hours as needed for Pain. 30 tablet 0   • albuterol 108 (90 Base) MCG/ACT inhaler Inhale 2 puffs into the lungs every 4 hours as needed for Shortness of Breath or Wheezing. 1 Inhaler 0     No current facility-administered medications for this visit.       MENTAL STATUS EXAM:  APPEARANCE: Dressed in street attire, showered and shaven, hair combed, fair dentition, fair-hygiene, appears stated age, well-nourished, normal body habitus  BEHAVIOR/MOTOR: Maintains fair eye contact, no   agitation/retardation/fidgeting/tremors/tics/tone within normal limits  GAIT: Gait with normal station and base  COOPERATIVENESS: Cooperative and engaging  SPEECH: Soft, normal rate, normal tone, no latency, coherent, normal articulation, no perseveration  THOUGHT PROCESSES: Logical and goal-oriented, linear, coherent, no flight of ideas, not concrete, not responding to internal stimuli  Associations: No loose associations, no tangentiality, no circumstantiality  THOUGHT CONTENT: No suicidal ideation/homocidal ideation/plan/intent/visual impairment/auditory-visual hallucinations, no obsessions or delusions  MOOD: \"7/10\"  AFFECT: Congruent  INSIGHT: Good  JUDGMENT: Good  COGNITION: Alert and oriented times 3, recent and remote memory intact, attention span and concentration within normal limits, language (naming) intact, fund of knowledge appropriate        Labs:   Component      Latest Ref Rng & Units 1/4/2021 1/4/2021          10:48 AM 10:48 AM   WBC      4.2 - 11.0 K/mcL 5.6    RBC      4.50 - 5.90 mil/mcL 5.06    HGB      13.0 - 17.0 g/dL 15.7    HCT      39.0 - 51.0 % 48.0    MCV      78.0 - 100.0 fl 94.9    MCH      26.0 - 34.0 pg 31.0    MCHC      32.0 - 36.5 g/dL 32.7    RDW-CV      11.0 - 15.0 % 12.5    RDW-SD      39.0 - 50.0 fL 43.1    PLT      140 - 450 K/mcL 271    NRBC       <=0 /100 WBC 0    Neutrophil      % 59    LYMPH      % 29    MONO      % 8    EOSIN      % 3    BASO      % 1    Immature Granulocytes      % 0    Absolute Neutrophil      1.8 - 7.7 K/mcL 3.3    Absolute Lymph      1.0 - 4.8 K/mcL 1.7    Absolute Mono      0.3 - 0.9 K/mcL 0.5    Absolute Eos      0.0 - 0.5 K/mcL 0.1    Absolute Baso      0.0 - 0.3 K/mcL 0.0    Absolute Immature Granulocytes      0.0 - 0.2 K/mcL 0.0    Fasting Status      Hours 12 12   Sodium      135 - 145 mmol/L 141    Potassium      3.4 - 5.1 mmol/L 3.9    Chloride      98 - 107 mmol/L 110 (H)    CO2      21 - 32 mmol/L 27    ANION GAP      10 - 20 mmol/L 8 (L)    Glucose      65 - 99 mg/dL 80    BUN      6 - 20 mg/dL 14    Creatinine      0.67 - 1.17 mg/dL 0.71    Glomerular Filtration Rate      >90 mL/min/1.73m2 >90    BUN/CREATININE RATIO      7 - 25 20    CALCIUM      8.4 - 10.2 mg/dL 9.1    TOTAL BILIRUBIN      0.2 - 1.0 mg/dL 0.6    AST/SGOT      <=37 Units/L 17    ALT/SGPT      <64 Units/L 36    ALK PHOSPHATASE      45 - 117 Units/L 87    Albumin      3.6 - 5.1 g/dL 4.3    TOTAL PROTEIN      6.4 - 8.2 g/dL 7.0    GLOBULIN      2.0 - 4.0 g/dL 2.7    A/G Ratio, Serum      1.0 - 2.4 1.6    CHOLESTEROL      <=199 mg/dL  185   TRIGLYCERIDE      <=149 mg/dL  122   HDL      >=40 mg/dL  61   CALCULATED LDL      <=129 mg/dL  100   CALCULATED NON HDL      mg/dL  124   CHOL/HDL      <=4.4  3.0   TSH      0.350 - 5.000 mcUnits/mL 1.577    T4, FREE      0.8 - 1.5 ng/dL 0.8        Vitals:  [facility] Extended Vitals 11/15/2018 12/7/2018 12/10/2018 6/9/2019   /96 138/88 132/78 136/88   Pulse 64 70 78 93   Resp 12 18  18   Temp    97.2   Weight kg  110.904 kg     Height 5' 11\" 5' 11\"     BMI  34.17     Pulse Ox    95   Patient Position  Sitting  Sitting   BP Location  LUE  LUE   Cuff Size  Regular       [facility] Extended Vitals 1/2/2020   /88   Pulse 78   Resp    Temp    Weight kg 98.612 kg   Height 5' 11\"   BMI 30.32   Pulse Ox 98   Patient Position    BP  Location    Cuff Size    126/82  as of 1/4/2021        DIAGNOSIS:   Diagnostic Impression: MDD, recurrent, mild (even improved since 4/21/21), panic disorder, r/o anxiety disorder unspecified, ADHD    ASSESSMENT:   Patient is a 30 year-old Cauc M with history of above diagnoses.  Depressive sxs improved but still active, not at goal.  Panic sxs not active.  Generalized anxety sxs not active, at goal.  ADHD sxs not active, at goal.    Patient does not meet criteria for inpatient hospitalization and is not in danger of harm to self or others.      TREATMENT PLAN:   -labs: TSH reviewed  -[place]-mon[place] error on website  -medications:   -start hydroxyzine 12-75mg PO TID PRN anxiety  -hold off on starting zoloft since pt and wife want to see how wellbutrin works  -continue adderall 20mg PO BID for ADHD  -increase wellbutrin 450mg XL PO qdaily for mood (was unable to start last week)  *Patient denies any side effects from current psychotropic regimen other than if documented above, discussed risks/benefits/side effects of current medications and patient verbalizes understanding.  -Therapy (individual, group, other) referral: offered but patient refused at present, patient encouraged to consider but does not wish to at this point, writer informed pt that if pt does reconsider pt can inform writer at any point, pt informed about the importance and benefits of therapy and the implications of not utilizing therapy which include but are not limited to worsening of psychiatric symptoms.  -Follow up: 4-6 wks or sooner if needed, pt given clinic number to reschedule to earlier appointment if pt needs to see writer sooner.  -Collateral: Patient does not want to involve family/friends in patient's care, pt does not give permission for writer to obtain records from outside providers  -Patient reports being compliant with medications and taking them as prescribed.      Yaneli Mejía D.O.      Treatment plan:  unchanged    Goal (which was to minimize or eliminate previously documented symptoms) progress: __X_Mild deterioration ___No progress ___Small long-term progress ___Small progress since last meeting ___Significant long-term progress  ___Significant progress since last meeting  ___Goals achieved    Current functioning: ___Severely impaired   ___Moderately impaired     ___Mildly impaired    X___Little or no impairment    Based on above risk assessment and absence of risk factors and presence of protective factors, writer is able to conclude that patient is not at risk of suicidality or at risk of harm to others.    Discussed with the patient the risk of drug-drug interactions and potential side effects, risks and/or lack of efficacy of their other medications and the need to monitor this with their other physicians. Patient educated about the risks of death and severe adverse side effects in case of use of any psychotropic medications along with any potential amount of alcohol and patient verbalizes understanding of this risk. Patient educated about the risks of anaphylactic reactions to any psychotropic medication and verbalizes understanding to report this to writer or emergency services.   Patient educated about the risks of each medication--which include but are not limited to the common side effects, rare side effects and high risk side effects which can include death.  Patient also educated about the potential benefits of the medications--bearing the risk and benefit in mind patient has agreed to take the aforementioned medications.  Patient informed that the duration of treatment is dependent on treatment response and that the desired outcome is complete remission from symptoms--patient also educated that in certain cases wherein symptom episodes recur for a given diagnosis and then remit, that life-long medication treatment is still recommended in this maintenance phase to prevent symptoms from recurring.   Patient was given an opportunity to ask questions, there were no educational barriers to learning and all questions were answered.  Patient educated regarding the nature of their illness including differential diagnosis.  Patient verbalized understanding of diagnosis and was educated on diagnosis, medication education and training, voiced understanding of treatment plan and alternatives of plan and other treatment options, right to refuse treatment, acceptance of potential risks and consequences, and has provided informed consent and agreed to take these medications.  Patient also verbalizes understanding of alternative medications and treatment modalities available to treat symptoms.  Patient also verbalizes understanding of probable consequences of not receiving or not being compliant with the proposed treatment/medications (including but not limited to leading to a hospital visit, ER (Emergency Room) visit or death).  Patient educated that the full benefit of the medication may not be seen if the patient is not taking it as prescribed. Having understood the advantages and potential side effects of the medication, patient voluntarily agrees to take the medication as prescribed by writer.  Patient understands that he/she has the right to withdraw consent to take the prescribed medication at any time.  Writer reinforced with the patient the need for compliance with care by reinforcing the importance of keeping regular appointments in order to accomplish therapy goals/safely monitor medications. Writer also emphasized the need for giving at least 24-hour cancellation notice explaining that another patient may be able to benefit from the vacant appointment time slot. Writer also assisted the patient in verbalizing a plan to maximize committment to treatment/scheduled appointments by assessing the best time/day of week for appointments, mode of transportation, arranging for  if applicable, and assessing  motivation for treatment.    Results of the following conveyed to patient in writing and orally:   1. initial assessment,   2. treatment alternatives: a) higher levels of care (IOP (Intensive Outpatient Program), PHP (Partial Hospitalization Program), residential treatment, day treatment, inpatient hospitalization, substance abuse services, case management), b. Group/individual/family therapy, c. community resources/community support groups, d. alternative treatments such as meditation/massage/acupuncture,yoga, e. neuropsychological testing/psychological testing   3. possible outcomes and side effects of treatment recommendations to be included in the treatment plan,   4. treatment recommendations and benefits of the treatment recommendations,   5. approximate duration and desired outcome of treatment recommended in the treatment plan    6. the outpatient behavioral health services that will be offered under the treatment plan.    Patient verbalizes agreement that any thoughts of harming self/others/thoughts of self-directed violence, etc. will be duly brought to the attention of health providers.  Patient has been provided with instructions in case of thoughts to harm self or others, including 24 hour crisis line number, calling 911, and/or going to the nearest emergency room if needed.  Patient verbally contracts with the writer regarding contacting emergency services or a local crisis line if plan or intent develops.      *writer advised cessation of caffeine as combination with any type of stimulant therapy can cause increased cardiovascular risk.    *patient denies any family history of sudden cardiac death or heart disease and verbalizes understanding to bring this information to writer if it occurs as this can be a risk factor for adverse outcomes while using stimulants.      Use of antiepileptic drugs for any indication may increase the risk of suicidality.  While some data suggest higher risk with  gabapentin, lamotrigine, oxcarbamazepine and tiagabine compared with topiramate or carbamazepine, other data suggest a stronger association with the underlying diagnosis than the drug.  FDA states that patients treated with antiepileptic drugs for any indication should be closely monitored for the emergence or worsening of depression, suicidal thoughts or behavior, any unusual changes in mood or behavior.  Patient informed about these risks and patient wishes to continue medications given potential benefit.  Patient informed by writer to report any worsening suicidal ideations related to the drug to writer and to report any unusual changes in mood or behavior to writer.  Patient instructed to go to the ED (Emergency Department) immediately for any suicidal ideations.  Patient verbalizes understanding of risks and of need to report to medical professionals any changes in behavior or emergence of suicidality.          Use of antiepileptic drugs for any indication may increase the risk of suicidality.  Patient was also warned about 2019 FDA alerts regarding life-threatening breathing difficulties that can occur in patients who use gabapentin or pregabalin with opioids or other drugs that depress the central nervous system.  Patient informed about these risks and patient wishes to continue medications given potential benefit.   In addition, mood stabilizers (including gabapentin) can cause dizziness, loss of balance and impaired coordination so they should not be used in any circumstance prior to driving or operating heavy machinery.  While some data suggest higher risk with gabapentin, lamotrigine, oxcarbamazepine and tiagabine compared with topiramate or carbamazepine, other data suggest a stronger association with the underlying diagnosis than the drug.  FDA states that patients treated with antiepileptic drugs for any indication should be closely monitored for the emergence or worsening of depression, suicidal  thoughts or behavior, any unusual changes in mood or behavior.  Patient informed about these risks and patient wishes to continue medications given potential benefit.  Patient informed by writer to report any worsening suicidal ideations related to the drug to writer and to report any unusual changes in mood or behavior to writer.  Patient instructed to go to the ED (Emergency Department) immediately for any suicidal ideations.  Patient verbalizes understanding of risks and of need to report to medical professionals any changes in behavior or emergence of suicidality.    Research on the risk for stroke following the use of mood stabilizers in patients with bipolar disorder is limited. Recently, a team of investigators from [facility] in [facility] investigated the risk for stroke following exposure to mood stabilizers in a retrospective cohort of 19,433 patients with bipolar disorder, in which 609 new-onset cases of stroke were identified from 1999 to 2012.[1]researchers found that acute exposure to carbamazepine was associated with the highest risk for stroke, particularly ischemic stroke, and that acute exposure to valproic acid elevated the risk for hemorrhagic stroke. In contrast, acute exposure to lithium and lamotrigine did not significantly increase the risk for any type of stroke.

## 2021-05-07 NOTE — DIETITIAN INITIAL EVALUATION ADULT. - PERTINENT LABORATORY DATA
Called [facility] pharmacy regarding medications:    topiramate ER (QUDEXY XR) 50 MG 24 hr capsule 30 capsule 1 5/3/2021  --   Sig - Route: Take 1 capsule (50 mg) by mouth daily - Oral   Sent to pharmacy as: Topiramate ER 50 MG Oral Capsule ER 24 Hour Sprinkle (QUDEXY XR)   Class: E-Prescribe     lisdexamfetamine (VYVANSE) 30 MG capsule 30 capsule 0 5/3/2021 6/2/2021 --   Sig - Route: Take 1 capsule (30 mg) by mouth daily - Oral   Sent to pharmacy as: Lisdexamfetamine Dimesylate 30 MG Oral Capsule (Vyvanse)   Class: E-Prescribe     PA is required for topiramate. Will send message to the PA team. This RN called patient's mother and left a detailed VM via  regarding need for PA. Will contact parent with insurance response for coverage.  Liz Tena RN       03-12 Na131 mmol/L<L> Glu 157 mg/dL<H> K+ 3.8 mmol/L Cr  0.32 mg/dL<L> BUN 27.0 mg/dL<H> Phos n/a   Alb n/a   PAB n/a

## 2021-05-14 NOTE — DISCHARGE NOTE NURSING/CASE MANAGEMENT/SOCIAL WORK - NSTRANSFERBELONGINGSDISPO_GEN_A_NUR
Cervical Collar Clearance: The patient had a CT scan of the cervical spine demonstrating no acute injury  On exam, the patient had no midline point tenderness or paresthesias/numbness/weakness in the extremities  The patient had full range of motion (was then able to flex, extend, and rotate head laterally) without pain  There were no distracting injuries and the patient was not intoxicated  The patient's cervical spine was cleared radiologically and clinically  Cervical collar removed at this time       Minerva Mackey PA-C  5/14/2021 5:14 PM given to family

## 2021-06-04 NOTE — PROGRESS NOTE ADULT - PROBLEM SELECTOR PLAN 2
100
Sitz bath daily
pt with hx of ibs  gi evaluation appreciated  pt refusing bentyl?
pt with hx of ibs  gi evaluation appreciated  pt refusing bentyl?

## 2021-07-05 NOTE — PROVIDER CONTACT NOTE (EICU) - NAME OF MD/NP/PA/DO NOTIFIED:
"    Assessment & Plan     BV (bacterial vaginosis)  Reports difficulty swallowing metronidazole, prefers oral treatment over vaginal.  Start clinidamycin 300 mg BID x 7 days.  Given handout on BV.   Return if worsening or not improving.   - clindamycin (CLEOCIN) 300 MG capsule; Take 1 capsule (300 mg) by mouth 2 times daily for 7 days    Vaginal discharge  - Wet prep  - Chlamydia trachomatis PCR  - Neisseria gonorrhoeae PCR        Return in about 2 weeks (around 7/19/2021) for If failure to improve, or sooner if worsening.    Tricia Funk, [facility] PRASHANTH Bhakta is a 20 year old who presents for the following health issues     HPI       Patient reports vaginal discharge, itching and bad odor for the last few days.  Concerned for either BV or yeast infection, but would also like to be tested for chlamydia and gonorrhea. Denies known STD contacts.       Review of Systems   Constitutional, HEENT, cardiovascular, pulmonary, gi and gu systems are negative, except as otherwise noted.      Objective    /80   Pulse 96   Temp 98.8  F (37.1  C)   Ht 1.702 m (5' 7\")   Wt 77.1 kg (170 lb)   SpO2 98%   BMI 26.63 kg/m    Body mass index is 26.63 kg/m .  Physical Exam   GENERAL: healthy, alert and no distress  EYES: Eyes grossly normal to inspection, PERRL and conjunctivae and sclerae normal  RESP: breathing unlabored.   MS: no gross musculoskeletal defects noted, no edema  SKIN: no suspicious lesions or rashes  NEURO: Normal strength and tone, mentation intact and speech normal  PSYCH: mentation appears normal, affect normal/bright    Wet prep  Order: [id number]  Status:  Final result   Visible to patient:  No (not released) Dx:  Vaginal discharge  Specimen Information: Vagina        Component  2:03 PM   Specimen Description Vagina    Wet Prep No Trichomonas seen    Wet Prep No yeast seen    Wet Prep Abnormal   Few   Clue cells seen     Wet Prep Few   WBC'S seen        " Jonathan LAZCANO

## 2021-07-07 NOTE — DIETITIAN INITIAL EVALUATION ADULT. - PATIENT PROFILE REVIEWED
yes Wartpeel Counseling:  I discussed with the patient the risks of Wartpeel including but not limited to erythema, scaling, itching, weeping, crusting, and pain.

## 2021-07-21 NOTE — PROGRESS NOTE ADULT - ASSESSMENT
58 yo RHWF female with complex medical history  s/p suspected multiple  CVAs vs. encephalitis being readmitted to acute rehabilitation unit  with spastic quadriparesis, more pronounced in left UE, sensory impairment, gait impairment  and functional deficits.    #r/o CVA  ASA continues c GI ppx.     #Diarrhea  - GI in, Bentyl ordered by GI-patient refused this am. Bentyl indication and dehydration risk explained at length. Patient reports 'no diarrhea now'.     #Leukocytosis  - afebrile, Medicine following.  - will continue to monitor labs and symptoms.     #tachycardia  -Metoprolol BID. Monitor VS closely. Denies chest pain or new weakness. Cardiology in. Will follow recommendations.     #ILD  -Pulmicort, Spiriva, pulmonary evaluation completed.  -on home O2 PRN.     #Seizure  On Depakote for seizure ppx. Fine Tremor RUE-intermittent.     # Anxiety  -Xanax as needed  -Remeron had been added for insomnia and to improve appetite, refuses.     #debility  Lovenox SQ cleared by GI, Nutrition eval. Island Pedicle Flap With Canthal Suspension Text: The defect edges were debeveled with a #15 scalpel blade.  Given the location of the defect, shape of the defect and the proximity to free margins an island pedicle advancement flap was deemed most appropriate.  Using a sterile surgical marker, an appropriate advancement flap was drawn incorporating the defect, outlining the appropriate donor tissue and placing the expected incisions within the relaxed skin tension lines where possible. The area thus outlined was incised deep to adipose tissue with a #15 scalpel blade.  The skin margins were undermined to an appropriate distance in all directions around the primary defect and laterally outward around the island pedicle utilizing iris scissors.  There was minimal undermining beneath the pedicle flap. A suspension suture was placed in the canthal tendon to prevent tension and prevent ectropion.

## 2021-08-24 NOTE — ED ADULT NURSE NOTE - CHIEF COMPLAINT QUOTE
Pt with known right sided pneumothorax. Was sent by Dr Lanza for xray to see if it has improved or worsened. No resp distress. Xerosis Aggressive Treatment: I recommended application of Cetaphil or CeraVe numerous times a day and before going to bed to all dry areas. I also prescribed a topical steroid for twice daily use.

## 2021-09-08 NOTE — BEHAVIORAL HEALTH ASSESSMENT NOTE - NS ED BHA MSE GENERAL APPEARANCE
Patient states he has a spot pop up on his right groin  Any strenuous activity half dollar spot will just pop up  Done this in the past- applied hepbana (pain relief) Marijuana derivative  This spot is firm  No redness not hot to touch    Appointment scheduled with Dr. Cohen for evaluation this morning.   Original appointment cancelled.   Patient verbalized an understanding of the information given.     Well developed

## 2021-11-26 NOTE — ED ADULT NURSE NOTE - CHPI ED NUR CONTEXT2
Pt went back into 's. Able to convert back into SR after bearing down. BP stable. No pain but felt palpitations. No PRNs available. Critical tropinin 0.841 or 721 for the high sensitivity value. Chest pain free and on Heparin gtt. Nickolas Stearns NP paged. No PRN's ordered as pt was given PO metoprolol.   
unknown

## 2022-01-05 NOTE — PATIENT PROFILE ADULT - NSPROMUTANXFEARADDRESSFT_GEN_A_NUR
0700- Report given to Montel Gaucher, RN by off going nurse. 1200- Pt assisted to recliner with PT/OT. 0- Pt assisted back to bed with standby assist.     1900- Report given to oncoming nurse by Montel Gaucher, RN. communiation with doctors

## 2022-04-06 NOTE — PATIENT PROFILE ADULT - NSPROMUTANXFEARADDRESSFT_GEN_A_NUR
Per Alonzo his insurance has not changed  I do have a prior auth approved with [address] - his secondary insurance  He is asking if I can help set up transportation because he is now in a wheelchair   I will see if this is something social work can help with none

## 2022-05-23 NOTE — ED ADULT NURSE NOTE - CHIEF COMPLAINT
[facility]    Administration Office  [address] [place] [address]      Office Hours:  Monday - Friday  7:30am - 4:00pm    Email: [email or web address]    Phone: [phone]    Facebook: Papito Nunez  
The patient is a 59y Female complaining of difficulty breathing.

## 2022-06-27 NOTE — PATIENT PROFILE ADULT - REASON FOR REFUSAL (REFER PATIENT TO HEALTHCARE PROVIDER FOR FOLLOW-UP):
----- Message from Evelyn Pryor sent at 6/27/2022  8:58 AM CDT -----  Contact: pt [phone]  Type: Needs Medical Advice  Who Called:  pt  Best Call Back Number: [phone]    Additional Information: Pt needs a tuberculosis order ASAP.Please call back to advise          
Does not wish to receive

## 2022-06-28 NOTE — PATIENT PROFILE ADULT - NUMBER OF YRS
ID [id number] Venezuelan interpretor    Last [facility] 1-5-2022 with MAS    Pt on Latuda 80mg and not taking Bupropion. Occasional anxiety attacks but doing okay. Pt states having increased panic attacks of late. Therapist retired and seeking additional but on waiting list.     Call got cut off  Recalled Venezuelan interpretor [id number]  . Talking to mom and pt. Pt states that with increased panic attacks looking to see if there is something can take in addition to latuda as needed. Requesting an appt to discuss with Dr. Butch Washburn. Appt scheduled for 1600 on 6/30 at [facility].    Pt and mom verbalize understanding 0

## 2022-07-12 NOTE — DIETITIAN INITIAL EVALUATION ADULT. - NS AS NUTRI INTERV COLLABORAT
PROCEDURE:  Retroperitoneal

 

INDICATIONS:  URINARY RETENTION

 

TECHNIQUE:  

Real-time scanning was performed of the retroperitoneal organs, with image documentation.  

 

COMPARISON:  CT abdomen pelvis 11/20/2020.

 

FINDINGS:  

 

Kidneys:  Kidneys are normal in size.  Right kidney measures 12.1 cm long; left kidney measures 10.4 
cm long.  Right renal cortical thickness is 1.9 cm; left renal cortical thickness is 1.5 cm.  No tiago
d masses, hydronephrosis, or nephrolithiasis.  A 1.8 cm renal sinus cyst is present at the upper kidn
ey and a simple appearing 2.9 cm cortical cyst is present at the upper kidney. No imaging follow-up i
s necessary for this finding per consensus recommendations based on imaging criteria.

 

Bladder: A Addison catheter is present in the bladder. The catheter was clamped one hour prior to the e
xam with bladder volume of 274 mL at the time of the study. The Addison catheter was unclamped, with ur
inary bladder volume decreasing to nearly empty. On pre-void images, both  ureteral jets are noted wi
th color Doppler interrogation.  (Of note, ureteral jets may not be detectable in up to 25% of cases 
due to insufficient differences in specific gravity between ureteral and bladder urine). The bladder 
wall appears thickened and trabeculated. 

 

Miscellaneous:  No free abdominal fluid. Approximate prostate gland dimensions of 3.7 x 3.2 x 3.4 cm,
 volume 21 cc. 

 

IMPRESSION:  

 

1. No hydronephrosis.

2. The wall of the urinary bladder appears thickened, which could be artifactual related to underdist
ention or could indicate a cystitis. Correlation with urinalysis may be helpful.

 

Reviewed by: Jones Valdivia MD on 7/12/2022 11:04 AM PDT

Approved by: Jones Valdivia MD on 7/12/2022 11:04 AM PDT

 

 

Station ID:  [id number]
Malnutrition Alert Placed in Electronic Medical Record & Obtain Weights Weekly

## 2022-07-20 NOTE — PROGRESS NOTE ADULT - SUBJECTIVE AND OBJECTIVE BOX
PULMONARY PROGRESS NOTE      ROMIE VIRAMONTES  MRN-[id number]    Patient is a 60y old  Female who presents with a chief complaint of Acute respiratory distress (15 Mar 2020 09:57)      INTERVAL HPI/OVERNIGHT EVENTS:    Patient s/p trach on vent  More awake and alert  Tachypneic, possibly related to anxiety as pt oxygenating well; TV of 370 cc on PCV    MEDICATIONS  (STANDING):  ascorbic acid 500 milliGRAM(s) Oral daily  atovaquone Suspension 1500 milliGRAM(s) Oral daily  chlorhexidine 0.12% Liquid 15 milliLiter(s) Oral Mucosa every 12 hours  collagenase Ointment 1 Application(s) Topical daily  enoxaparin Injectable 40 milliGRAM(s) SubCutaneous daily  famotidine    Tablet 20 milliGRAM(s) Oral daily  ferrous    sulfate 325 milliGRAM(s) Oral two times a day  multivitamin 1 Tablet(s) Oral daily  OXcarbazepine 600 milliGRAM(s) Oral two times a day  polyethylene glycol 3350 17 Gram(s) Oral at bedtime  predniSONE   Tablet 20 milliGRAM(s) Oral daily  propranolol 20 milliGRAM(s) Oral three times a day  senna Syrup 10 milliLiter(s) Oral at bedtime  sucralfate suspension 1 Gram(s) Oral every 6 hours      MEDICATIONS  (PRN):  acetaminophen    Suspension .. 650 milliGRAM(s) Oral every 6 hours PRN Temp greater or equal to 38C (100.4F), Mild Pain (1 - 3)  ALBUTerol   0.042% 1.25 milliGRAM(s) Nebulizer four times a day PRN Shortness of Breath and/or Wheezing  aluminum hydroxide/magnesium hydroxide/simethicone Suspension 30 milliLiter(s) Oral every 4 hours PRN Dyspepsia  melatonin 5 milliGRAM(s) Oral at bedtime PRN Insomnia  simethicone drops 40 milliGRAM(s) Oral every 6 hours PRN Gas      Allergies    IV Contrast (Anaphylaxis)  shellfish. (Anaphylaxis)    Intolerances    lorazepam (Other (Severe))      PAST MEDICAL & SURGICAL HISTORY:  Interstitial lung disease: on home o2 prn  NHL (non-Hodgkin's lymphoma): Agem 43 sp chemo/rt/stem cell  Transient cerebral ischemia, unspecified type  Mitral prolapse  History of tonsillectomy  History of appendectomy        REVIEW OF SYSTEMS: pt unable to provide    Vital Signs Last 24 Hrs  T(C): 36.9 (15 Mar 2020 08:00), Max: 37.3 (15 Mar 2020 04:00)  T(F): 98.4 (15 Mar 2020 08:00), Max: 99.1 (15 Mar 2020 04:00)  HR: 103 (15 Mar 2020 08:48) (75 - 103)  BP: 96/52 (15 Mar 2020 08:00) (90/50 - 119/57)  BP(mean): 71 (15 Mar 2020 08:00) (64 - 82)  RR: 32 (15 Mar 2020 08:00) (25 - 34)  SpO2: 94% (15 Mar 2020 08:48) (88% - 100%)    PHYSICAL EXAMINATION:    GENERAL: The patient is awake and alert s/p trach on vent in apparent distress though tachypneic.    HEENT: Head is normocephalic and atraumatic. Extraocular muscles are intact.     NECK: Supple. + trach    LUNGS: Clear to auscultation without wheezing, rales or rhonchi; respirations unlabored    HEART: Mildly tachycardic without murmur.    ABDOMEN: Soft, nontender, and nondistended.  + PEG    EXTREMITIES: Without any cyanosis, clubbing, rash, lesions or edema.      LABS:                        7.3    21.10 )-----------( 741      ( 15 Mar 2020 05:17 )             25.2     03-15    134<L>  |  88<L>  |  35.0<H>  ----------------------------<  117<H>  4.7   |  37.0<H>  |  0.26<L>    Ca    9.2      15 Mar 2020 05:17  Mg     2.1     03-14    TPro  6.0<L>  /  Alb  2.9<L>  /  TBili  <0.2<L>  /  DBili  x   /  AST  26  /  ALT  33<H>  /  AlkPhos  179<H>  03-14        ABG - ( 15 Mar 2020 09:18 )  pH, Arterial: 7.38  pH, Blood: x     /  pCO2: 74    /  pO2: 63    / HCO3: 40    / Base Excess: 15.9  /  SaO2: 92            MICROBIOLOGY:    Rapid Respiratory Viral Panel (03.10.20 @ 23:13)    Rapid RVP Result: NotDetec: This Respiratory Panel does NOT detect the 2019 novel coronavirus  (2019-nCoV) involved with the outbreak originating in [facility].  This Respiratory Panel uses polymerase chain reaction (PCR) to detect for  adenovirus; coronavirus (HKU1, NL63, 229E, OC43); human metapneumovirus  (hMPV); human enterovirus/rhinovirus (Entero/RV); influenza A; influenza  A/H1; influenza A/H3; influenza A/H1-2009; influenza B; parainfluenza  viruses 1, 2, 3, 4; respiratory syncytial virus; Mycoplasma pneumoniae;  and Chlamydophila pneumoniae.    Culture - Sputum . (03.12.20 @ 16:28)    Gram Stain:   Numerous polymorphonuclear leukocytes per low power field  No Squamous epithelial cells per low power field  No organisms seen per oil power field    Specimen Source: .Sputum    Culture Results:   Normal Respiratory Meme present        RADIOLOGY & ADDITIONAL STUDIES:     EXAM:  US DPLX LWR EXT VEINS COMPL BI                          PROCEDURE DATE:  03/12/2020          INTERPRETATION:  CLINICAL INFORMATION: Difficulty breathing.    COMPARISON: Bilateral lower extremity venous Doppler 1/23/2020    TECHNIQUE: Duplexsonography of the BILATERAL LOWER extremity veins with color and spectral Doppler, with and without compression.      FINDINGS:    There is normal compressibility of the bilateral common femoral, femoral and popliteal veins.     Doppler examination shows normal spontaneous and phasic flow.    No calf vein thrombosis is detected.    IMPRESSION:     No evidence of deep venous thrombosis in either lower extremity.        REJI PEREZ   This document has been electronically signed.Mar 12 2020  1:38PM         EXAM:  XR CHEST PORTABLE URGENT 1V                          PROCEDURE DATE:  03/12/2020          INTERPRETATION:  Portable chest radiograph        CLINICAL INFORMATION:   Pneumothorax. Follow-up.    TECHNIQUE:  Portable  AP view of the chest was obtained.    COMPARISON: 3/10/2020 available for review.    FINDINGS: There is a persistent RIGHT apical lung 10% pneumothorax without midline shift.  Chronic interstitial lung lung disease unchanged. Please see CT scan 3/11/2020 for further assessment..  There is mild coronary megaly.. Visualized osseous structures are intact.    Tracheostomy tube in place    IMPRESSION:   Stable RIGHT apical 10% pneumothorax. Chronic interstitial lung disease. Tracheostomy tube in place..          KAMAR JIMENEZ M.D., ATTENDING RADIOLOGIST  This document has been electronically signed. Mar 12 2020 12:22PM          ECHO:    CONCLUSIONS:    1. Normal left ventricular systolic function. The ejection fraction is approximately 65%.  2. Thickened trileaflet aortic valve with no significant aortic insufficiency.  3. Thickened mitral valve with mild posterior leaflet prolapse. Moderate mitral insufficiency.  4. Mild left atrial enlargement.  5. Normal right ventricular size and systolic function.  6. Grade 1 diastolic dysfunction.  7. Right ventricular systolic pressure equals 48 mmHg, assuming a right atrial pressure of 8 mmHg, consistent with mild pulmonary hypertension.            MOOK RUBIO M.D., ATTENDING CARDIOLOGIST  This document has been electronically signed. Jan 11 2020  8:28AM Stroke Dementia with behavioral problem

## 2022-08-10 NOTE — ED PROVIDER NOTE - PROGRESS NOTE DETAILS
HR 85, sao2 99, Respiratory rate 29 HR 85, SaO2 99, Respiratory rate 29 Home Placed on vent with pressure control. Pt's breathing is better and stridor has resolved. HR normalized and sats 99%

## 2022-09-22 NOTE — ED ADULT NURSE NOTE - GENITOURINARY ASSESSMENT
Continuity of Care Document

                          Created on:2022



Patient:KATERYNA JENKINS

Sex:Female

:2005

External Reference #:[id number]





Demographics







                          Address                   [address] 



                                                    [place] [address]

 

                          Phone                     Unavailable

 

                          Preferred Language        English

 

                          Marital Status            Never 

 

                          Voodoo Affiliation     Unknown

 

                          Race                      Unknown

 

                          Ethnic Group              Unknown









Author







                          Organization              [facility]

 

                          Address                   [address] [place] [address]

 

                          Phone                     [phone]









Allergies and Intolerances







                 date            description     facility        type

 

                 (no date)       Opioids - Morphine Analogues  [facility]  

(unknown)

 

                 (no date)       doxycycline     [facility]  (unknown)







Encounters

No information.



Functional Status

No information.



Immunizations

No information.



Medications

No information.



Problems

No information.



Procedures

No information.



Results/Labs







           test      date      author    facility  value     unit      interpret

ation









                                         Result panel 1









           (unknown)  (no       (unknown)  (unknown)  (no value)  (units    (unk

nown)



                    date)                                   unknown)  

 

           (unknown)  (no       (unknown)  (unknown)  [place]  (units    (

unknown)



                    date)                         [id number]     unknown)  

 

           (unknown)  (no       (unknown)  (unknown)  [facility]     (units    (unkno

wn)



                    date)                                   unknown)  

 

           (unknown)  (no       (unknown)  (unknown)  [facility]  (units   

 (unknown)



                    date)                         [facility] unknown)  

 

           (unknown)  (no       (unknown)  (unknown)  Gynecology Visit  (units  

  (unknown)



                    date)                                   unknown)  

 

           (unknown)  (no       (unknown)  (unknown)  rash      (units    (unkno

wn)



                    date)                                   unknown)  

 

           (unknown)  (no       (unknown)  (unknown)  vomitt    (units    (unkno

wn)



                    date)                                   unknown)  

 

           (unknown)  (no       (unknown)  (unknown)  (no value)  (units    (unk

nown)



                    date)                                   unknown)  

 

           (unknown)  (no       (unknown)  (unknown)  22  (units    (unkno

wn)



                    date)                                   unknown)  

 

           (unknown)  (no       (unknown)  (unknown)  [id number]    (units    (unkno

wn)



                    date)                                   unknown)  

 

           (unknown)  (no       (unknown)  (unknown)  Acne (-2016)  (units    (u

nknown)



                    date)                                   unknown)  

 

           (unknown)  (no       (unknown)  (unknown)  Age/Sex: 17 / F  (units   

 (unknown)



                    date)                         Date of Service: unknown)  

 

           (unknown)  (no       (unknown)  (unknown)  Allergies  (units    (unkn

own)



                    date)                                   unknown)  

 

           (unknown)  (no       (unknown)  (unknown)  Anesthesia  (units    (unk

nown)



                    date)                                   unknown)  

 

           (unknown)  (no       (unknown)  (unknown)  Attending Dr:  (units    (

unknown)



                    date)                         Jose Almodovar unknown)  



                                                  MD                  

 

           (unknown)  (no       (unknown)  (unknown)  Chlamydia  (units    (unkn

own)



                    date)                         infection () unknown)  

 

           (unknown)  (no       (unknown)  (unknown)  : 2005  (units   

 (unknown)



                    date)                         Acct:[id number] unknown)  

 

           (unknown)  (no       (unknown)  (unknown)  Dept at   (units    (unkno

wn)



                    date)                         [phone]. unknown)  

 

           (unknown)  (no       (unknown)  (unknown)  Documented By:  (units    

(unknown)



                    date)                         Jose Almodovar unknown)  



                                                  MD 22 0803           

 

           (unknown)  (no       (unknown)  (unknown)  Endometriosis  (units    (

unknown)



                    date)                         ()   unknown)  

 

           (unknown)  (no       (unknown)  (unknown)  Heavy menstrual  (units   

 (unknown)



                    date)                         period () unknown)  

 

           (unknown)  (no       (unknown)  (unknown)  Intake    (units    (unkno

wn)



                    date)                                   unknown)  

 

           (unknown)  (no       (unknown)  (unknown)  Intake Note:  (units    (u

nknown)



                    date)                                   unknown)  

 

           (unknown)  (no       (unknown)  (unknown)  Intake performed  (units  

  (unknown)



                    date)                         by:       unknown)  



                                                  Marcela Mcfarlane           

 

           (unknown)  (no       (unknown)  (unknown)  Intake- Clincial  (units  

  (unknown)



                    date)                         Staff     unknown)  

 

           (unknown)  (no       (unknown)  (unknown)  Irregular  (units    (unkn

own)



                    date)                         menstrual cycle unknown)  



                                                  ()             

 

           (unknown)  (no       (unknown)  (unknown)  Loc: [facility]  (units    (unkno

wn)



                    date)                                   unknown)  

 

           (unknown)  (no       (unknown)  (unknown)  Lymphangioma  (units    (u

nknown)



                    date)                                   unknown)  

 

           (unknown)  (no       (unknown)  (unknown)  Medical History  (units   

 (unknown)



                    date)                         (Reviewed unknown)  



                                                  22 @ 14:51           



                                                  by Denia Faustin MD)                 

 

           (unknown)  (no       (unknown)  (unknown)  Opioids -  (units    (unkn

own)



                    date)                         Morphine  unknown)  



                                                  Analogues Allergy           



                                                  (Intermediate,           



                                                  Verified 22           



                                                  08:39)              

 

           (unknown)  (no       (unknown)  (unknown)  Ovarian cyst  (units    (u

nknown)



                    date)                                   unknown)  

 

           (unknown)  (no       (unknown)  (unknown)  PFSH      (units    (unkno

wn)



                    date)                                   unknown)  

 

           (unknown)  (no       (unknown)  (unknown)  Painful   (units    (unkno

wn)



                    date)                         menstrual periods unknown)  



                                                  ()             

 

           (unknown)  (no       (unknown)  (unknown)  Patient:  (units    (unkno

wn)



                    date)                         Kateryna Jenkins unknown)  



                                                  MR#: [id number]           

 

           (unknown)  (no       (unknown)  (unknown)  Reason For Visit  (units  

  (unknown)



                    date)                                   unknown)  

 

           (unknown)  (no       (unknown)  (unknown)  S/P ACL   (units    (unkno

wn)



                    date)                         reconstruction unknown)  



                                                  ()           

 

           (unknown)  (no       (unknown)  (unknown)  Signed By:  (units    (unk

nown)



                    date)                                   unknown)  

 

           (unknown)  (no       (unknown)  (unknown)  Smoking Status:  (units   

 (unknown)



                    date)                         Never smoker unknown)  

 

           (unknown)  (no       (unknown)  (unknown)  Surgical History  (units  

  (unknown)



                    date)                         (Updated 22 unknown)  



                                                  @ 14:51 by Denia Faustin MD)           

 

           (unknown)  (no       (unknown)  (unknown)  TOA       (units    (unkno

wn)



                    date)                         (tubo-ovarian unknown)  



                                                  abscess)            



                                                  ()           

 

           (unknown)  (no       (unknown)  (unknown)  This note may  (units    (

unknown)



                    date)                         have been all or unknown)  



                                                  partially           



                                                  generated using           



                                                  voice recognition           

 

           (unknown)  (no       (unknown)  (unknown)  Tobacco +  (units    (unkn

own)



                    date)                         Substance Use unknown)  

 

           (unknown)  (no       (unknown)  (unknown)  Tobacco Status  (units    

(unknown)



                    date)                                   unknown)  

 

           (unknown)  (no       (unknown)  (unknown)  Visit Reasons:  (units    

(unknown)



                    date)                         Ovarian   unknown)  



                                                  Cyst/Pelvic Pain           

 

           (unknown)  (no       (unknown)  (unknown)  doxycycline  (units    (un

known)



                    date)                         Adverse Reaction unknown)  



                                                  (Intermediate,           



                                                  Verified 22           



                                                  08:39)              

 

           (unknown)  (no       (unknown)  (unknown)  have occurred.  (units    

(unknown)



                    date)                         If there are any unknown)  



                                                  questions, please           



                                                  contact the           



                                                  Medical Records           

 

           (unknown)  (no       (unknown)  (unknown)  may occur.  (units    (unk

nown)



                    date)                         Occasional unknown)  



                                                  wrong-word or           



                                                  'sound-alike'           



                                                  substitutions may           



                                                  have                

 

           (unknown)  (no       (unknown)  (unknown)  occurred due to  (units   

 (unknown)



                    date)                         the inherent unknown)  



                                                  limitations of           



                                                  voice recognition           



                                                  software. Please           

 

           (unknown)  (no       (unknown)  (unknown)  pt here for  (units    (un

known)



                    date)                         ovarian   unknown)  



                                                  cyst/pelvic pain.           



                                                  had US at [facility]           



                                                  22 for           



                                                  abnormal            

 

           (unknown)  (no       (unknown)  (unknown)  read the note  (units    (

unknown)



                    date)                         carefully and unknown)  



                                                  recognize, using           



                                                  context, where           



                                                  these               



                                                  substitutions           

 

           (unknown)  (no       (unknown)  (unknown)  software.  (units    (unkn

own)



                    date)                         Although every unknown)  



                                                  effort is made to           



                                                  edit content,           



                                                  transcription           



                                                  errors              

 

           (unknown)  (no       (unknown)  (unknown)  uterine   (units    (unkno

wn)



                    date)                         bleeding. she is unknown)  



                                                  a previous pt of           



                                                  Dr Faustin           









                                         Result panel 2









           (unknown)  (no       (unknown)  (unknown)  (no value)  (units    (unk

nown)



                    date)                                   unknown)  

 

           (unknown)  (no       (unknown)  (unknown)  (no value)  (units    (unk

nown)



                    date)                                   unknown)  

 

           (unknown)  (no       (unknown)  (unknown)  22  (units    (unkno

wn)



                    date)                                   unknown)  

 

           (unknown)  (no       (unknown)  (unknown)  08:15     (units    (unkno

wn)



                    date)                                   unknown)  

 

           (unknown)  (no       (unknown)  (unknown)  REN[facility] Roland  (units    (

unknown)



                    date)                         [id number]     unknown)  

 

           (unknown)  (no       (unknown)  (unknown)  [facility]     (units    (unkno

wn)



                    date)                                   unknown)  

 

           (unknown)  (no       (unknown)  (unknown)  [facility]  (units   

 (unknown)



                    date)                         [facility] unknown)  

 

           (unknown)  (no       (unknown)  (unknown)  Gynecology Visit  (units  

  (unknown)



                    date)                                   unknown)  

 

           (unknown)  (no       (unknown)  (unknown)  rash      (units    (unkno

wn)



                    date)                                   unknown)  

 

           (unknown)  (no       (unknown)  (unknown)  vomitt    (units    (unkno

wn)



                    date)                                   unknown)  

 

           (unknown)  (no       (unknown)  (unknown)  (no value)  (units    (unk

nown)



                    date)                                   unknown)  

 

           (unknown)  (no       (unknown)  (unknown)  22  (units    (unkno

wn)



                    date)                                   unknown)  

 

           (unknown)  (no       (unknown)  (unknown)  [id number]    (units    (unkno

wn)



                    date)                                   unknown)  

 

           (unknown)  (no       (unknown)  (unknown)  Acne (-2016)  (units    (u

nknown)



                    date)                                   unknown)  

 

           (unknown)  (no       (unknown)  (unknown)  Age/Sex: 17 / F  (units   

 (unknown)



                    date)                         Date of Service: unknown)  

 

           (unknown)  (no       (unknown)  (unknown)  Allergies  (units    (unkn

own)



                    date)                                   unknown)  

 

           (unknown)  (no       (unknown)  (unknown)  Anesthesia  (units    (unk

nown)



                    date)                                   unknown)  

 

           (unknown)  (no       (unknown)  (unknown)  Attending Dr:  (units    (

unknown)



                    date)                         Jose Almodovar unknown)  



                                                  MD                  

 

           (unknown)  (no       (unknown)  (unknown)  BMI 26.6  (units    (unkno

wn)



                    date)                                   unknown)  

 

           (unknown)  (no       (unknown)  (unknown)  BP 98/60  (units    (unkno

wn)



                    date)                                   unknown)  

 

           (unknown)  (no       (unknown)  (unknown)  Blood Pressure  (units    

(unknown)



                    date)                         Location Lt unknown)  



                                                  brachial            

 

           (unknown)  (no       (unknown)  (unknown)  Chlamydia  (units    (unkn

own)



                    date)                         infection () unknown)  

 

           (unknown)  (no       (unknown)  (unknown)  : 2005  (units   

 (unknown)



                    date)                         Acct:[id number] unknown)  

 

           (unknown)  (no       (unknown)  (unknown)  Dept at   (units    (unkno

wn)



                    date)                         [phone]. unknown)  

 

           (unknown)  (no       (unknown)  (unknown)  Documented By:  (units    

(unknown)



                    date)                         Jose Almodovar unknown)  



                                                  MD 22 0803           

 

           (unknown)  (no       (unknown)  (unknown)  Endometriosis  (units    (

unknown)



                    date)                         ()   unknown)  

 

           (unknown)  (no       (unknown)  (unknown)  Heavy menstrual  (units   

 (unknown)



                    date)                         period (-2019) unknown)  

 

           (unknown)  (no       (unknown)  (unknown)  Height 5 ft 3.5  (units   

 (unknown)



                    date)                         in        unknown)  

 

           (unknown)  (no       (unknown)  (unknown)  Intake    (units    (unkno

wn)



                    date)                                   unknown)  

 

           (unknown)  (no       (unknown)  (unknown)  Intake Note:  (units    (u

nknown)



                    date)                                   unknown)  

 

           (unknown)  (no       (unknown)  (unknown)  Intake performed  (units  

  (unknown)



                    date)                         by:       unknown)  



                                                  Marcela Mcfarlane           

 

           (unknown)  (no       (unknown)  (unknown)  Intake- Clincial  (units  

  (unknown)



                    date)                         Staff     unknown)  

 

           (unknown)  (no       (unknown)  (unknown)  Irregular  (units    (unkn

own)



                    date)                         menstrual cycle unknown)  



                                                  ()             

 

           (unknown)  (no       (unknown)  (unknown)  Last Menstural  (units    

(unknown)



                    date)                         Cycle + Details unknown)  

 

           (unknown)  (no       (unknown)  (unknown)  Loc: [facility]  (units    (unkno

wn)



                    date)                                   unknown)  

 

           (unknown)  (no       (unknown)  (unknown)  Lymphangioma  (units    (u

nknown)



                    date)                                   unknown)  

 

           (unknown)  (no       (unknown)  (unknown)  Medical History  (units   

 (unknown)



                    date)                         (Reviewed unknown)  



                                                  22 @ 14:51           



                                                  by Denia Faustin MD)                 

 

           (unknown)  (no       (unknown)  (unknown)  Medications  (units    (un

known)



                    date)                                   unknown)  

 

           (unknown)  (no       (unknown)  (unknown)  Opioids -  (units    (unkn

own)



                    date)                         Morphine  unknown)  



                                                  Analogues Allergy           



                                                  (Intermediate,           



                                                  Verified 22           



                                                  08:15)              

 

           (unknown)  (no       (unknown)  (unknown)  Other Menstrual  (units   

 (unknown)



                    date)                         Period: Other unknown)  

 

           (unknown)  (no       (unknown)  (unknown)  Ovarian cyst  (units    (u

nknown)



                    date)                                   unknown)  

 

           (unknown)  (no       (unknown)  (unknown)  PFSH      (units    (unkno

wn)



                    date)                                   unknown)  

 

           (unknown)  (no       (unknown)  (unknown)  Painful   (units    (unkno

wn)



                    date)                         menstrual periods unknown)  



                                                  ()             

 

           (unknown)  (no       (unknown)  (unknown)  Patient:  (units    (unkno

wn)



                    date)                         Kateryna Jenkins unknown)  



                                                  MR#: [id number]           

 

           (unknown)  (no       (unknown)  (unknown)  Position Sitting  (units  

  (unknown)



                    date)                                   unknown)  

 

           (unknown)  (no       (unknown)  (unknown)  Reason For Visit  (units  

  (unknown)



                    date)                                   unknown)  

 

           (unknown)  (no       (unknown)  (unknown)  S/P ACL   (units    (unkno

wn)



                    date)                         reconstruction unknown)  



                                                  (-21)           

 

           (unknown)  (no       (unknown)  (unknown)  Signed By:  (units    (unk

nown)



                    date)                                   unknown)  

 

           (unknown)  (no       (unknown)  (unknown)  Smoking Status:  (units   

 (unknown)



                    date)                         Never smoker unknown)  

 

           (unknown)  (no       (unknown)  (unknown)  Surgical History  (units  

  (unknown)



                    date)                         (Updated 22 unknown)  



                                                  @ 14:51 by Denia Faustin MD)           

 

           (unknown)  (no       (unknown)  (unknown)  TOA       (units    (unkno

wn)



                    date)                         (tubo-ovarian unknown)  



                                                  abscess)            



                                                  (-21)           

 

           (unknown)  (no       (unknown)  (unknown)  This note may  (units    (

unknown)



                    date)                         have been all or unknown)  



                                                  partially           



                                                  generated using           



                                                  voice recognition           

 

           (unknown)  (no       (unknown)  (unknown)  Tobacco +  (units    (unkn

own)



                    date)                         Substance Use unknown)  

 

           (unknown)  (no       (unknown)  (unknown)  Tobacco Status  (units    

(unknown)



                    date)                                   unknown)  

 

           (unknown)  (no       (unknown)  (unknown)  Visit Reasons:  (units    

(unknown)



                    date)                         Ovarian   unknown)  



                                                  Cyst/Pelvic Pain           

 

           (unknown)  (no       (unknown)  (unknown)  Vitals    (units    (unkno

wn)



                    date)                                   unknown)  

 

           (unknown)  (no       (unknown)  (unknown)  Weight 153 lb  (units    (

unknown)



                    date)                                   unknown)  

 

           (unknown)  (no       (unknown)  (unknown)  doxycycline  (units    (un

known)



                    date)                         Adverse Reaction unknown)  



                                                  (Intermediate,           



                                                  Verified 22           



                                                  08:15)              

 

           (unknown)  (no       (unknown)  (unknown)  had US and CT  (units    (

unknown)



                    date)                         scan.     unknown)  

 

           (unknown)  (no       (unknown)  (unknown)  have occurred.  (units    

(unknown)



                    date)                         If there are any unknown)  



                                                  questions, please           



                                                  contact the           



                                                  Medical Records           

 

           (unknown)  (no       (unknown)  (unknown)  may occur.  (units    (unk

nown)



                    date)                         Occasional unknown)  



                                                  wrong-word or           



                                                  'sound-alike'           



                                                  substitutions may           



                                                  have                

 

           (unknown)  (no       (unknown)  (unknown)  occurred due to  (units   

 (unknown)



                    date)                         the inherent unknown)  



                                                  limitations of           



                                                  voice recognition           



                                                  software. Please           

 

           (unknown)  (no       (unknown)  (unknown) promethazine 25  (units    

(unknown)



                    date)                         mg tablet 25 mg unknown)  



                                                  PO Q6H PRN           



                                                  22 [History           



                                                  Confirmed           



                                                  22]           

 

           (unknown)  (no       (unknown)  (unknown) pt here for  (units    (unk

nown)



                    date)                         ovarian   unknown)  



                                                  cyst/pelvic pain.           



                                                  was seen in ER at           



                                                  [facility] on           



                                                  Wednesday and           

 

           (unknown)  (no       (unknown)  (unknown)  read the note  (units    (

unknown)



                    date)                         carefully and unknown)  



                                                  recognize, using           



                                                  context, where           



                                                  these               



                                                  substitutions           

 

           (unknown)  (no       (unknown)  (unknown)  software.  (units    (unkn

own)



                    date)                         Although every unknown)  



                                                  effort is made to           



                                                  edit content,           



                                                  transcription           



                                                  errors              









                                         Result panel 3









           (unknown)  (no       (unknown)  (unknown)  (no value)  (units    (unk

nown)



                    date)                                   unknown)  

 

           (unknown)  (no       (unknown)  (unknown)  (no value)  (units    (unk

nown)



                    date)                                   unknown)  

 

           (unknown)  (no       (unknown)  (unknown)  22  (units    (unkno

wn)



                    date)                                   unknown)  

 

           (unknown)  (no       (unknown)  (unknown)  08:15     (units    (unkno

wn)



                    date)                                   unknown)  

 

           (unknown)  (no       (unknown)  (unknown)  [place]  (units    (

unknown)



                    date)                         [id number]     unknown)  

 

           (unknown)  (no       (unknown)  (unknown)  [employer]     (units    (unkno

wn)



                    date)                                   unknown)  

 

           (unknown)  (no       (unknown)  (unknown)  [facility]  (units   

 (unknown)



                    date)                         [facility] unknown)  

 

           (unknown)  (no       (unknown)  (unknown)  Gynecology Visit  (units  

  (unknown)



                    date)                                   unknown)  

 

           (unknown)  (no       (unknown)  (unknown)  rash      (units    (unkno

wn)



                    date)                                   unknown)  

 

           (unknown)  (no       (unknown)  (unknown)  vomitt    (units    (unkno

wn)



                    date)                                   unknown)  

 

           (unknown)  (no       (unknown)  (unknown)  (no value)  (units    (unk

nown)



                    date)                                   unknown)  

 

           (unknown)  (no       (unknown)  (unknown)  22  (units    (unkno

wn)



                    date)                                   unknown)  

 

           (unknown)  (no       (unknown)  (unknown)  [id number]    (units    (unkno

wn)



                    date)                                   unknown)  

 

           (unknown)  (no       (unknown)  (unknown)  Acne (-2016)  (units    (u

nknown)



                    date)                                   unknown)  

 

           (unknown)  (no       (unknown)  (unknown)  Age/Sex: 17 / F  (units   

 (unknown)



                    date)                         Date of Service: unknown)  

 

           (unknown)  (no       (unknown)  (unknown)  Allergies  (units    (unkn

own)



                    date)                                   unknown)  

 

           (unknown)  (no       (unknown)  (unknown)  Anesthesia  (units    (unk

nown)



                    date)                                   unknown)  

 

           (unknown)  (no       (unknown)  (unknown)  Attending Dr:  (units    (

unknown)



                    date)                         Jose Almodovar MD unknown)  

 

           (unknown)  (no       (unknown)  (unknown)  BMI 26.6  (units    (unkno

wn)



                    date)                                   unknown)  

 

           (unknown)  (no       (unknown)  (unknown)  BP 98/60  (units    (unkno

wn)



                    date)                                   unknown)  

 

           (unknown)  (no       (unknown)  (unknown)  Blood Pressure  (units    

(unknown)



                    date)                         Location Lt unknown)  



                                                  brachial            

 

           (unknown)  (no       (unknown)  (unknown)  Chief Complaint  (units   

 (unknown)



                    date)                                   unknown)  

 

           (unknown)  (no       (unknown)  (unknown)  Chief Complaint:  (units  

  (unknown)



                    date)                         Right lower unknown)  



                                                  quadrant pain           

 

           (unknown)  (no       (unknown)  (unknown)  Chlamydia  (units    (unkn

own)



                    date)                         infection () unknown)  

 

           (unknown)  (no       (unknown)  (unknown)  : 2005  (units   

 (unknown)



                    date)                         Acct:[id number] unknown)  

 

           (unknown)  (no       (unknown)  (unknown)  Dept at   (units    (unkno

wn)



                    date)                         [phone] unknown)  

 

           (unknown)  (no       (unknown)  (unknown)  Details:  (units    (unkno

wn)



                    date)                                   unknown)  

 

           (unknown)  (no       (unknown)  (unknown)  Documented By:  (units    

(unknown)



                    date)                         Jose Almodovar MD unknown)  



                                                  22 0803           

 

           (unknown)  (no       (unknown)  (unknown)  Endometriosis  (units    (

unknown)



                    date)                         ()   unknown)  

 

           (unknown)  (no       (unknown)  (unknown)  Kateryna is a  (units    (unk

nown)



                    date)                         17-year-old unknown)  



                                                  nulligravida, LMP           



                                                  in May 2022 who           



                                                  presents with an 8           

 

           (unknown)  (no       (unknown)  (unknown)  HPI       (units    (unkno

wn)



                    date)                                   unknown)  

 

           (unknown)  (no       (unknown)  (unknown)  Heavy menstrual  (units   

 (unknown)



                    date)                         period () unknown)  

 

           (unknown)  (no       (unknown)  (unknown)  Height 5 ft 3.5  (units   

 (unknown)



                    date)                         in        unknown)  

 

           (unknown)  (no       (unknown)  (unknown)  Hospital on  (units    (un

known)



                    date)                         2022 at unknown)  



                                                  which time she           



                                                  underwent           



                                                  abdominal/pelvic           



                                                  CT and              

 

           (unknown)  (no       (unknown)  (unknown)  Intake    (units    (unkno

wn)



                    date)                                   unknown)  

 

           (unknown)  (no       (unknown)  (unknown)  Intake Note:  (units    (u

nknown)



                    date)                                   unknown)  

 

           (unknown)  (no       (unknown)  (unknown)  Intake performed  (units  

  (unknown)



                    date)                         by: Marcela Mcfarlane unknown)  

 

           (unknown)  (no       (unknown)  (unknown)  Intake- Clincial  (units  

  (unknown)



                    date)                         Staff     unknown)  

 

           (unknown)  (no       (unknown)  (unknown)  Irregular  (units    (unkn

own)



                    date)                         menstrual cycle unknown)  



                                                  ()             

 

           (unknown)  (no       (unknown)  (unknown)  Last Menstural  (units    

(unknown)



                    date)                         Cycle + Details unknown)  

 

           (unknown)  (no       (unknown)  (unknown)  Loc: [facility]  (units    (unkno

wn)



                    date)                                   unknown)  

 

           (unknown)  (no       (unknown)  (unknown)  Lymphangioma  (units    (u

nknown)



                    date)                                   unknown)  

 

           (unknown)  (no       (unknown)  (unknown)  Medical History  (units   

 (unknown)



                    date)                         (Reviewed 22 unknown)  



                                                  @ 14:51 by Denia Faustin MD)           

 

           (unknown)  (no       (unknown)  (unknown)  Medications  (units    (un

known)



                    date)                                   unknown)  

 

           (unknown)  (no       (unknown)  (unknown)  Opioids -  (units    (unkn

own)



                    date)                         Morphine Analogues unknown)  



                                                  Allergy             



                                                  (Intermediate,           



                                                  Verified 22           



                                                  08:15)              

 

           (unknown)  (no       (unknown)  (unknown)  Other Menstrual  (units   

 (unknown)



                    date)                         Period: Other unknown)  

 

           (unknown)  (no       (unknown)  (unknown)  Ovarian cyst  (units    (u

nknown)



                    date)                                   unknown)  

 

           (unknown)  (no       (unknown)  (unknown)  PFSH      (units    (unkno

wn)



                    date)                                   unknown)  

 

           (unknown)  (no       (unknown)  (unknown)  Painful menstrual  (units 

   (unknown)



                    date)                         periods () unknown)  

 

           (unknown)  (no       (unknown)  (unknown)  Patient:  (units    (unkno

wn)



                    date)                         Casper,Ktaeryna unknown)  



                                                  MR#: [id number]           

 

           (unknown)  (no       (unknown)  (unknown)  Position Sitting  (units  

  (unknown)



                    date)                                   unknown)  

 

           (unknown)  (no       (unknown)  (unknown)  Reason For Visit  (units  

  (unknown)



                    date)                                   unknown)  

 

           (unknown)  (no       (unknown)  (unknown)  S/P ACL   (units    (unkno

wn)



                    date)                         reconstruction unknown)  



                                                  ()           

 

           (unknown)  (no       (unknown)  (unknown)  Signed By:  (units    (unk

nown)



                    date)                                   unknown)  

 

           (unknown)  (no       (unknown)  (unknown)  Smoking Status:  (units   

 (unknown)



                    date)                         Never smoker unknown)  

 

           (unknown)  (no       (unknown)  (unknown)  Surgical History  (units  

  (unknown)



                    date)                         (Updated 22 unknown)  



                                                  @ 14:51 by Denia Faustin MD)           

 

           (unknown)  (no       (unknown)  (unknown)  TOA (tubo-ovarian  (units 

   (unknown)



                    date)                         abscess)  unknown)  



                                                  ()           

 

           (unknown)  (no       (unknown)  (unknown)  This note may  (units    (

unknown)



                    date)                         have been all or unknown)  



                                                  partially           



                                                  generated using           



                                                  voice recognition           

 

           (unknown)  (no       (unknown)  (unknown)  Tobacco +  (units    (unkn

own)



                    date)                         Substance Use unknown)  

 

           (unknown)  (no       (unknown)  (unknown)  Tobacco Status  (units    

(unknown)



                    date)                                   unknown)  

 

           (unknown)  (no       (unknown)  (unknown)  Visit Reasons:  (units    

(unknown)



                    date)                         Ovarian   unknown)  



                                                  Cyst/Pelvic Pain           

 

           (unknown)  (no       (unknown)  (unknown)  Vitals    (units    (unkno

wn)



                    date)                                   unknown)  

 

           (unknown)  (no       (unknown)  (unknown)  Weight 153 lb  (units    (

unknown)



                    date)                                   unknown)  

 

           (unknown)  (no       (unknown)  (unknown)  abscess due to  (units    

(unknown)



                    date)                         chlamydia treated unknown)  



                                                  as an inpatient at           



                                                  [facility]           

 

           (unknown)  (no       (unknown)  (unknown)  back in December  (units  

  (unknown)



                    date)                         . No records unknown)  



                                                  are available for           



                                                  review of that           

 

           (unknown)  (no       (unknown)  (unknown)  cycles since the  (units  

  (unknown)



                    date)                         TOA or as she had unknown)  



                                                  regular             



                                                  predictable           



                                                  periods up until           



                                                  that                

 

           (unknown)  (no       (unknown)  (unknown)  doxycycline  (units    (un

known)



                    date)                         Adverse Reaction unknown)  



                                                  (Intermediate,           



                                                  Verified 22           



                                                  08:15)              

 

           (unknown)  (no       (unknown)  (unknown)  had US and CT  (units    (

unknown)



                    date)                         scan.     unknown)  

 

           (unknown)  (no       (unknown)  (unknown)  have occurred. If  (units 

   (unknown)



                    date)                         there are any unknown)  



                                                  questions, please           



                                                  contact the           



                                                  Medical Records           

 

           (unknown)  (no       (unknown)  (unknown)  hemorrhagic cyst  (units  

  (unknown)



                    date)                         within the right unknown)  



                                                  ovary.              

 

           (unknown)  (no       (unknown)  (unknown)  hospitalization  (units   

 (unknown)



                    date)                         but apparently she unknown)  



                                                  was treated with           



                                                  IV antibiotics and           

 

           (unknown)  (no       (unknown)  (unknown)  may occur.  (units    (unk

nown)



                    date)                         Occasional unknown)  



                                                  wrong-word or           



                                                  'sound-alike'           



                                                  substitutions may           



                                                  have                

 

           (unknown)  (no       (unknown)  (unknown)  month history of  (units  

  (unknown)



                    date)                         right lower unknown)  



                                                  quadrant pain           



                                                  following           



                                                  right-sided           



                                                  tubo-ovarian           

 

           (unknown)  (no       (unknown)  (unknown)  occurred due to  (units   

 (unknown)



                    date)                         the inherent unknown)  



                                                  limitations of           



                                                  voice recognition           



                                                  software. Please           

 

           (unknown)  (no       (unknown)  (unknown)  pelvic ultrasound  (units 

   (unknown)



                    date)                         which were largely unknown)  



                                                  unremarkable with           



                                                  the exception of a           



                                                  3 cm                

 

           (unknown)  (no       (unknown)  (unknown)  percutaneous  (units    (u

nknown)



                    date)                         drainage of the unknown)  



                                                  abscess. Since           



                                                  that time she is           



                                                  had persistent           

 

           (unknown)  (no       (unknown)  (unknown) promethazine 25 mg  (units 

   (unknown)



                    date)                         tablet 25 mg PO unknown)  



                                                  Q6H PRN 22           



                                                  [History Confirmed           



                                                  22]           

 

           (unknown)  (no       (unknown)  (unknown) pt here for  (units    (unk

nown)



                    date)                         ovarian   unknown)  



                                                  cyst/pelvic pain.           



                                                  was seen in ER at           



                                                  [facility] on           



                                                  Wednesday and           

 

           (unknown)  (no       (unknown)  (unknown)  read the note  (units    (

unknown)



                    date)                         carefully and unknown)  



                                                  recognize, using           



                                                  context, where           



                                                  these               



                                                  substitutions           

 

           (unknown)  (no       (unknown)  (unknown)  right lower  (units    (un

known)



                    date)                         quadrant pain unknown)  



                                                  which is noncyclic           



                                                  and has had only           



                                                  very irregular           

 

           (unknown)  (no       (unknown)  (unknown)  software.  (units    (unkn

own)



                    date)                         Although every unknown)  



                                                  effort is made to           



                                                  edit content,           



                                                  transcription           



                                                  errors              

 

           (unknown)  (no       (unknown)  (unknown)  time. She  (units    (unkn

own)



                    date)                         presents today in unknown)  



                                                  follow-up from an           



                                                  ER visit at           



                                                  [facility]           









                                         Result panel 4









           (unknown)  (no       (unknown)  (unknown)  (no value)  (units    (unk

nown)



                    date)                                   unknown)  

 

           (unknown)  (no       (unknown)  (unknown)  (no value)  (units    (unk

nown)



                    date)                                   unknown)  

 

           (unknown)  (no       (unknown)  (unknown)  22  (units    (unkno

wn)



                    date)                                   unknown)  

 

           (unknown)  (no       (unknown)  (unknown)  08:15     (units    (unkno

wn)



                    date)                                   unknown)  

 

           (unknown)  (no       (unknown)  (unknown)  [place]  (units    (

unknown)



                    date)                         [id number]     unknown)  

 

           (unknown)  (no       (unknown)  (unknown)  [facility]     (units    (unkno

wn)



                    date)                                   unknown)  

 

           (unknown)  (no       (unknown)  (unknown)  [facility]  (units   

 (unknown)



                    date)                         [facility] unknown)  

 

           (unknown)  (no       (unknown)  (unknown)  Gynecology Visit  (units  

  (unknown)



                    date)                                   unknown)  

 

           (unknown)  (no       (unknown)  (unknown)  rash      (units    (unkno

wn)



                    date)                                   unknown)  

 

           (unknown)  (no       (unknown)  (unknown)  vomitt    (units    (unkno

wn)



                    date)                                   unknown)  

 

           (unknown)  (no       (unknown)  (unknown)  (no value)  (units    (unk

nown)



                    date)                                   unknown)  

 

           (unknown)  (no       (unknown)  (unknown)  22  (units    (unkno

wn)



                    date)                                   unknown)  

 

           (unknown)  (no       (unknown)  (unknown)  [id number]    (units    (unkno

wn)



                    date)                                   unknown)  

 

           (unknown)  (no       (unknown)  (unknown)  Acne (-2016)  (units    (u

nknown)



                    date)                                   unknown)  

 

           (unknown)  (no       (unknown)  (unknown)  Age/Sex: 17 / F  (units   

 (unknown)



                    date)                         Date of Service: unknown)  

 

           (unknown)  (no       (unknown)  (unknown)  Allergies  (units    (unkn

own)



                    date)                                   unknown)  

 

           (unknown)  (no       (unknown)  (unknown)  Anesthesia  (units    (unk

nown)



                    date)                                   unknown)  

 

           (unknown)  (no       (unknown)  (unknown)  Attending Dr:  (units    (

unknown)



                    date)                         Jose Almodovar MD unknown)  

 

           (unknown)  (no       (unknown)  (unknown)  BMI 26.6  (units    (unkno

wn)



                    date)                                   unknown)  

 

           (unknown)  (no       (unknown)  (unknown)  BP 98/60  (units    (unkno

wn)



                    date)                                   unknown)  

 

           (unknown)  (no       (unknown)  (unknown)  Blood Pressure  (units    

(unknown)



                    date)                         Location Lt unknown)  



                                                  brachial            

 

           (unknown)  (no       (unknown)  (unknown)  Chief Complaint  (units   

 (unknown)



                    date)                                   unknown)  

 

           (unknown)  (no       (unknown)  (unknown)  Chief Complaint:  (units  

  (unknown)



                    date)                         Right lower unknown)  



                                                  quadrant pain           

 

           (unknown)  (no       (unknown)  (unknown)  Chlamydia  (units    (unkn

own)



                    date)                         infection () unknown)  

 

           (unknown)  (no       (unknown)  (unknown)  : 2005  (units   

 (unknown)



                    date)                         Acct:[id number] unknown)  

 

           (unknown)  (no       (unknown)  (unknown)  Dept at   (units    (unkno

wn)



                    date)                         [phone] unknown)  

 

           (unknown)  (no       (unknown)  (unknown)  Details:  (units    (unkno

wn)



                    date)                                   unknown)  

 

           (unknown)  (no       (unknown)  (unknown)  Documented By:  (units    

(unknown)



                    date)                         Jose Almodovar MD unknown)  



                                                  22 0803           

 

           (unknown)  (no       (unknown)  (unknown)  ER visit at  (units    (un

known)



                    date)                         [facility] unknown)  



                                                  [facility] on           



                                                  2022 at           



                                                  which time she           



                                                  underwent           

 

           (unknown)  (no       (unknown)  (unknown)  Endometriosis  (units    (

unknown)



                    date)                         ()   unknown)  

 

           (unknown)  (no       (unknown)  (unknown)  Kateryna is a  (units    (unk

nown)



                    date)                         17-year-old unknown)  



                                                  nulligravida, LMP           



                                                  in May 2022 who           



                                                  presents with an 8           

 

           (unknown)  (no       (unknown)  (unknown)  HPI       (units    (unkno

wn)



                    date)                                   unknown)  

 

           (unknown)  (no       (unknown)  (unknown)  Heavy menstrual  (units   

 (unknown)



                    date)                         period () unknown)  

 

           (unknown)  (no       (unknown)  (unknown)  Height 5 ft 3.5  (units   

 (unknown)



                    date)                         in        unknown)  

 

           (unknown)  (no       (unknown)  (unknown)  Intake    (units    (unkno

wn)



                    date)                                   unknown)  

 

           (unknown)  (no       (unknown)  (unknown)  Intake Note:  (units    (u

nknown)



                    date)                                   unknown)  

 

           (unknown)  (no       (unknown)  (unknown)  Intake performed  (units  

  (unknown)



                    date)                         by: Marcela Mcfarlane unknown)  

 

           (unknown)  (no       (unknown)  (unknown)  Intake- Clincial  (units  

  (unknown)



                    date)                         Staff     unknown)  

 

           (unknown)  (no       (unknown)  (unknown)  Irregular  (units    (unkn

own)



                    date)                         menstrual cycle unknown)  



                                                  ()             

 

           (unknown)  (no       (unknown)  (unknown)  Last Menstural  (units    

(unknown)



                    date)                         Cycle + Details unknown)  

 

           (unknown)  (no       (unknown)  (unknown)  Loc: [facility]  (units    (unkno

wn)



                    date)                                   unknown)  

 

           (unknown)  (no       (unknown)  (unknown)  Lymphangioma  (units    (u

nknown)



                    date)                                   unknown)  

 

           (unknown)  (no       (unknown)  (unknown)  Medical History  (units   

 (unknown)



                    date)                         (Reviewed 22 unknown)  



                                                  @ 14:51 by Denia Faustin MD)           

 

           (unknown)  (no       (unknown)  (unknown)  Medications  (units    (un

known)



                    date)                                   unknown)  

 

           (unknown)  (no       (unknown)  (unknown)  Opioids -  (units    (unkn

own)



                    date)                         Morphine Analogues unknown)  



                                                  Allergy             



                                                  (Intermediate,           



                                                  Verified 22           



                                                  08:15)              

 

           (unknown)  (no       (unknown)  (unknown)  Other Menstrual  (units   

 (unknown)



                    date)                         Period: Other unknown)  

 

           (unknown)  (no       (unknown)  (unknown)  Ovarian cyst  (units    (u

nknown)



                    date)                                   unknown)  

 

           (unknown)  (no       (unknown)  (unknown)  PFSH      (units    (unkno

wn)



                    date)                                   unknown)  

 

           (unknown)  (no       (unknown)  (unknown)  Painful menstrual  (units 

   (unknown)



                    date)                         periods () unknown)  

 

           (unknown)  (no       (unknown)  (unknown)  Patient:  (units    (unkno

wn)



                    date)                         Kateryna Jenkins unknown)  



                                                  MR#: [id number]           

 

           (unknown)  (no       (unknown)  (unknown)  Position Sitting  (units  

  (unknown)



                    date)                                   unknown)  

 

           (unknown)  (no       (unknown)  (unknown)  Reason For Visit  (units  

  (unknown)



                    date)                                   unknown)  

 

           (unknown)  (no       (unknown)  (unknown)  S/P ACL   (units    (unkno

wn)



                    date)                         reconstruction unknown)  



                                                  (-21)           

 

           (unknown)  (no       (unknown)  (unknown)  Signed By:  (units    (unk

nown)



                    date)                                   unknown)  

 

           (unknown)  (no       (unknown)  (unknown)  Smoking Status:  (units   

 (unknown)



                    date)                         Never smoker unknown)  

 

           (unknown)  (no       (unknown)  (unknown)  Surgical History  (units  

  (unknown)



                    date)                         (Updated 22 unknown)  



                                                  @ 14:51 by Denia Faustin MD)           

 

           (unknown)  (no       (unknown)  (unknown)  TOA (tubo-ovarian  (units 

   (unknown)



                    date)                         abscess)  unknown)  



                                                  (-21)           

 

           (unknown)  (no       (unknown)  (unknown)  This note may  (units    (

unknown)



                    date)                         have been all or unknown)  



                                                  partially           



                                                  generated using           



                                                  voice recognition           

 

           (unknown)  (no       (unknown)  (unknown)  Tobacco +  (units    (unkn

own)



                    date)                         Substance Use unknown)  

 

           (unknown)  (no       (unknown)  (unknown)  Tobacco Status  (units    

(unknown)



                    date)                                   unknown)  

 

           (unknown)  (no       (unknown)  (unknown)  Visit Reasons:  (units    

(unknown)



                    date)                         Ovarian   unknown)  



                                                  Cyst/Pelvic Pain           

 

           (unknown)  (no       (unknown)  (unknown)  Vitals    (units    (unkno

wn)



                    date)                                   unknown)  

 

           (unknown)  (no       (unknown)  (unknown)  Weight 153 lb  (units    (

unknown)



                    date)                                   unknown)  

 

           (unknown)  (no       (unknown)  (unknown)  abdominal/pelvic  (units  

  (unknown)



                    date)                         CT and pelvic unknown)  



                                                  ultrasound which           



                                                  were largely           



                                                  unremarkable with           

 

           (unknown)  (no       (unknown)  (unknown)  abscess due to  (units    

(unknown)



                    date)                         chlamydia treated unknown)  



                                                  as an inpatient at           



                                                  [facility]           

 

           (unknown)  (no       (unknown)  (unknown)  and therefore  (units    (

unknown)



                    date)                         discontinue the unknown)  



                                                  oral antibiotics.           



                                                  As a result she           



                                                  only had            

 

           (unknown)  (no       (unknown)  (unknown)  back in December  (units  

  (unknown)



                    date)                         . No records unknown)  



                                                  are available for           



                                                  review of that           

 

           (unknown)  (no       (unknown)  (unknown)  doxycycline  (units    (un

known)



                    date)                         Adverse Reaction unknown)  



                                                  (Intermediate,           



                                                  Verified 22           



                                                  08:15)              

 

           (unknown)  (no       (unknown)  (unknown)  following IV  (units    (u

nknown)



                    date)                         antibiotics but unknown)  



                                                  apparently had an           



                                                  adverse reaction           



                                                  to Vibramycin           

 

           (unknown)  (no       (unknown)  (unknown)  had US and CT  (units    (

unknown)



                    date)                         scan.     unknown)  

 

           (unknown)  (no       (unknown)  (unknown)  has had only very  (units 

   (unknown)



                    date)                         irregular cycles unknown)  



                                                  since the TOA or           



                                                  as she had regular           

 

           (unknown)  (no       (unknown)  (unknown)  have occurred. If  (units 

   (unknown)



                    date)                         there are any unknown)  



                                                  questions, please           



                                                  contact the           



                                                  Medical Records           

 

           (unknown)  (no       (unknown)  (unknown)  hospitalization  (units   

 (unknown)



                    date)                         but apparently she unknown)  



                                                  was treated with           



                                                  IV antibiotics and           

 

           (unknown)  (no       (unknown)  (unknown)  intravenous  (units    (un

known)



                    date)                         antibiotic therapy unknown)  



                                                  and no follow-up           



                                                  oral antibiotic           



                                                  therapy. Since           

 

           (unknown)  (no       (unknown)  (unknown)  may occur.  (units    (unk

nown)



                    date)                         Occasional unknown)  



                                                  wrong-word or           



                                                  'sound-alike'           



                                                  substitutions may           



                                                  have                

 

           (unknown)  (no       (unknown)  (unknown)  month history of  (units  

  (unknown)



                    date)                         right lower unknown)  



                                                  quadrant pain           



                                                  following           



                                                  right-sided           



                                                  tubo-ovarian           

 

           (unknown)  (no       (unknown)  (unknown)  occurred due to  (units   

 (unknown)



                    date)                         the inherent unknown)  



                                                  limitations of           



                                                  voice recognition           



                                                  software. Please           

 

           (unknown)  (no       (unknown)  (unknown)  percutaneous  (units    (u

nknown)



                    date)                         drainage of the unknown)  



                                                  abscess. She was           



                                                  placed on oral           



                                                  antibiotics           

 

           (unknown)  (no       (unknown)  (unknown) predictable  (units    (unk

nown)



                    date)                         periods up until unknown)  



                                                  that time. She           



                                                  presents today in           



                                                  follow-up from an           

 

           (unknown)  (no       (unknown)  (unknown) promethazine 25 mg  (units 

   (unknown)



                    date)                         tablet 25 mg PO unknown)  



                                                  Q6H PRN 22           



                                                  [History Confirmed           



                                                  22]           

 

           (unknown)  (no       (unknown)  (unknown) pt here for  (units    (unk

nown)



                    date)                         ovarian   unknown)  



                                                  cyst/pelvic pain.           



                                                  was seen in ER at           



                                                  [facility] on           



                                                  Wednesday and           

 

           (unknown)  (no       (unknown)  (unknown)  read the note  (units    (

unknown)



                    date)                         carefully and unknown)  



                                                  recognize, using           



                                                  context, where           



                                                  these               



                                                  substitutions           

 

           (unknown)  (no       (unknown)  (unknown)  software.  (units    (unkn

own)



                    date)                         Although every unknown)  



                                                  effort is made to           



                                                  edit content,           



                                                  transcription           



                                                  errors              

 

           (unknown)  (no       (unknown)  (unknown) that time she is  (units   

 (unknown)



                    date)                         had persistent unknown)  



                                                  right lower           



                                                  quadrant pain           



                                                  which is noncyclic           



                                                  and                 

 

           (unknown)  (no       (unknown)  (unknown)  the exception of  (units  

  (unknown)



                    date)                         a 3 cm hemorrhagic unknown)  



                                                  cyst within the           



                                                  right ovary.           









                                         Result panel 5









           (unknown)  (no       (unknown)  (unknown)  (no value)  (units    (unk

nown)



                    date)                                   unknown)  

 

           (unknown)  (no       (unknown)  (unknown)  Medications:  (units    (u

nknown)



                    date)                                   unknown)  

 

           (unknown)  (no       (unknown)  (unknown)  Status: Acute  (units    (

unknown)



                    date)                                   unknown)  

 

           (unknown)  (no       (unknown)  (unknown)  (no value)  (units    (unk

nown)



                    date)                                   unknown)  

 

           (unknown)  (no       (unknown)  (unknown)  22  (units    (unkno

wn)



                    date)                                   unknown)  

 

           (unknown)  (no       (unknown)  (unknown)  08:15     (units    (unkno

wn)



                    date)                                   unknown)  

 

           (unknown)  (no       (unknown)  (unknown)  [place] [address]  (unit

s    (unknown)



                    date)                                   unknown)  

 

           (unknown)  (no       (unknown)  (unknown)  [facility]     (units    (unkno

wn)



                    date)                                   unknown)  

 

           (unknown)  (no       (unknown)  (unknown)  [facility]  (units   

 (unknown)



                    date)                         [facility] unknown)  

 

           (unknown)  (no       (unknown)  (unknown)  Gynecology Visit  (units  

  (unknown)



                    date)                                   unknown)  

 

           (unknown)  (no       (unknown)  (unknown)  rash      (units    (unkno

wn)



                    date)                                   unknown)  

 

           (unknown)  (no       (unknown)  (unknown)  vomitt    (units    (unkno

wn)



                    date)                                   unknown)  

 

           (unknown)  (no       (unknown)  (unknown)  (no value)  (units    (unk

nown)



                    date)                                   unknown)  

 

           (unknown)  (no       (unknown)  (unknown)  (1) Chronic right  (units 

   (unknown)



                    date)                         lower quadrant pain: unknown) 

 

 

           (unknown)  (no       (unknown)  (unknown)  (2) Chronic  (units    (un

known)



                    date)                         salpingitis: unknown)  

 

           (unknown)  (no       (unknown)  (unknown)  (3) Irregular  (units    (

unknown)



                    date)                         menstrual cycle: unknown)  

 

           (unknown)  (no       (unknown)  (unknown)  22  (units    (unkno

wn)



                    date)                                   unknown)  

 

           (unknown)  (no       (unknown)  (unknown)  [id number]    (units    (unkno

wn)



                    date)                                   unknown)  

 

           (unknown)  (no       (unknown)  (unknown)  Acne (-2016)  (units    (u

nknown)



                    date)                                   unknown)  

 

           (unknown)  (no       (unknown)  (unknown)  Affect: normal  (units    

(unknown)



                    date)                         affect    unknown)  

 

           (unknown)  (no       (unknown)  (unknown)  Age/Sex: 17 / F  (units   

 (unknown)



                    date)                         Date of Service: unknown)  

 

           (unknown)  (no       (unknown)  (unknown)  Allergies  (units    (unkn

own)



                    date)                                   unknown)  

 

           (unknown)  (no       (unknown)  (unknown)  Anesthesia  (units    (unk

nown)



                    date)                                   unknown)  

 

           (unknown)  (no       (unknown)  (unknown)  Appearance: grossly  (unit

s    (unknown)



                    date)                         normal    unknown)  

 

           (unknown)  (no       (unknown)  (unknown)  Assessment + Plan  (units 

   (unknown)



                    date)                                   unknown)  

 

           (unknown)  (no       (unknown)  (unknown)  Attending Dr:  (units    (

unknown)



                    date)                         Jose Almodovar MD unknown)  

 

           (unknown)  (no       (unknown)  (unknown)  Attitude:  (units    (unkn

own)



                    date)                         cooperative unknown)  

 

           (unknown)  (no       (unknown)  (unknown)  BMI 26.6  (units    (unkno

wn)



                    date)                                   unknown)  

 

           (unknown)  (no       (unknown)  (unknown)  BP 98/60  (units    (unkno

wn)



                    date)                                   unknown)  

 

           (unknown)  (no       (unknown)  (unknown)  Bimanual Exam-  (units    

(unknown)



                    date)                         Adnexa, other: no unknown)  



                                                  masses, normal,           



                                                  tender (Marked,           



                                                  right sided),           

 

           (unknown)  (no       (unknown)  (unknown)  Bimanual Exam-  (units    

(unknown)



                    date)                         Vagina + Uterus: unknown)  



                                                  uterine size normal,          

 



                                                  uterine mobility           



                                                  normal,             

 

           (unknown)  (no       (unknown)  (unknown)  Blood Pressure  (units    

(unknown)



                    date)                         Location Lt brachial unknown) 

 

 

           (unknown)  (no       (unknown)  (unknown)  Chief Complaint  (units   

 (unknown)



                    date)                                   unknown)  

 

           (unknown)  (no       (unknown)  (unknown)  Chief Complaint:  (units  

  (unknown)



                    date)                         Right lower quadrant unknown) 

 



                                                  pain                

 

           (unknown)  (no       (unknown)  (unknown)  Chlamydia infection  (unit

s    (unknown)



                    date)                         ()   unknown)  

 

           (unknown)  (no       (unknown)  (unknown)  Confirmed 22]  (unit

s    (unknown)



                    date)                                   unknown)  

 

           (unknown)  (no       (unknown)  (unknown)  Conjunctivae:  (units    (

unknown)



                    date)                         conjunctivae normal unknown)  

 

           (unknown)  (no       (unknown)  (unknown)  Const     (units    (unkno

wn)



                    date)                                   unknown)  

 

           (unknown)  (no       (unknown)  (unknown)  : 2005  (units   

 (unknown)



                    date)                         Acct:[id number] unknown)  

 

           (unknown)  (no       (unknown)  (unknown)  Dept at   (units    (unkno

wn)



                    date)                         [phone] unknown)  

 

           (unknown)  (no       (unknown)  (unknown)  Details:  (units    (unkno

wn)



                    date)                                   unknown)  

 

           (unknown)  (no       (unknown)  (unknown)  Documented By:  (units    

(unknown)



                    date)                         Jose Almodovar MD unknown)  



                                                  22 0803           

 

           (unknown)  (no       (unknown)  (unknown)  EOM: EOM intact  (units   

 (unknown)



                    date)                         bilaterally unknown)  

 

           (unknown)  (no       (unknown)  (unknown)  ER visit at [facility]  (unit

s    (unknown)



                    date)                         [facility] on unknown)  



                                                  2022 at which           



                                                  time she underwent           

 

           (unknown)  (no       (unknown)  (unknown)  Ears: hearing  (units    (

unknown)



                    date)                         grossly normal unknown)  



                                                  bilaterally           

 

           (unknown)  (no       (unknown)  (unknown)  Effort +  (units    (unkno

wn)



                    date)                         Inspection: normal unknown)  



                                                  respiratory effort           



                                                  and able to speak in          

 



                                                  complete            

 

           (unknown)  (no       (unknown)  (unknown)  Endometriosis  (units    (

unknown)



                    date)                         ()   unknown)  

 

           (unknown)  (no       (unknown)  (unknown)  Exam      (units    (unkno

wn)



                    date)                                   unknown)  

 

           (unknown)  (no       (unknown)  (unknown)  External Female  (units   

 (unknown)



                    date)                         Exam: normal unknown)  



                                                  external appearance           



                                                  and normal           



                                                  appearance of the           

 

           (unknown)  (no       (unknown)  (unknown)  Eyes      (units    (unkno

wn)



                    date)                                   unknown)  

 

           (unknown)  (no       (unknown)  (unknown)  Face and sinus:  (units   

 (unknown)



                    date)                         face symmetric unknown)  

 

           (unknown)  (no       (unknown)  (unknown)  GI        (units    (unkno

wn)



                    date)                                   unknown)  

 

           (unknown)  (no       (unknown)  (unknown)          (units    (unkno

wn)



                    date)                                   unknown)  

 

           (unknown)  (no       (unknown)  (unknown)  General: appearance  (unit

s    (unknown)



                    date)                         normal, both eyes unknown)  



                                                  and all related           



                                                  structures           

 

           (unknown)  (no       (unknown)  (unknown)  General:  (units    (unkno

wn)



                    date)                         cooperative, unknown)  



                                                  comfortable and no           



                                                  acute distress           

 

           (unknown)  (no       (unknown)  (unknown)  General: deferred  (units 

   (unknown)



                    date)                                   unknown)  

 

           (unknown)  (no       (unknown)  (unknown)  Kateryna is a  (units    (unk

nown)



                    date)                         17-year-old unknown)  



                                                  nulligravida, LMP in          

 



                                                  May 2022 who           



                                                  presents with an 8           

 

           (unknown)  (no       (unknown)  (unknown)  HENMT     (units    (unkno

wn)



                    date)                                   unknown)  

 

           (unknown)  (no       (unknown)  (unknown)  HPI       (units    (unkno

wn)



                    date)                                   unknown)  

 

           (unknown)  (no       (unknown)  (unknown)  Head: normal to  (units   

 (unknown)



                    date)                         inspection, unknown)  



                                                  normocephalic and           



                                                  atraumatic           

 

           (unknown)  (no       (unknown)  (unknown)  Heavy menstrual  (units   

 (unknown)



                    date)                         period () unknown)  

 

           (unknown)  (no       (unknown)  (unknown)  Height  5 ft 3.5 in  (unit

s    (unknown)



                    date)                                   unknown)  

 

           (unknown)  (no       (unknown)  (unknown)  Inspection: normal  (units

    (unknown)



                    date)                         to inspection unknown)  

 

           (unknown)  (no       (unknown)  (unknown)  Intake    (units    (unkno

wn)



                    date)                                   unknown)  

 

           (unknown)  (no       (unknown)  (unknown)  Intake Note:  (units    (u

nknown)



                    date)                                   unknown)  

 

           (unknown)  (no       (unknown)  (unknown)  Intake performed  (units  

  (unknown)



                    date)                         by: Marcela Mcfarlane unknown)  

 

           (unknown)  (no       (unknown)  (unknown)  Intake- Clincial  (units  

  (unknown)



                    date)                         Staff     unknown)  

 

           (unknown)  (no       (unknown)  (unknown)  Irregular menstrual  (unit

s    (unknown)



                    date)                         cycle () unknown)  

 

           (unknown)  (no       (unknown)  (unknown)  Judgment: judgment  (units

    (unknown)



                    date)                         good      unknown)  

 

           (unknown)  (no       (unknown)  (unknown)  Last Menstural  (units    

(unknown)



                    date)                         Cycle + Details unknown)  

 

           (unknown)  (no       (unknown)  (unknown)  Loc: FMA  (units    (unkno

wn)



                    date)                                   unknown)  

 

           (unknown)  (no       (unknown)  (unknown)  Lymphangioma  (units    (u

nknown)



                    date)                                   unknown)  

 

           (unknown)  (no       (unknown)  (unknown)  Medical History  (units   

 (unknown)



                    date)                         (Updated 22 @ unknown)  



                                                  09:05 by Jose Almodovar MD)           

 

           (unknown)  (no       (unknown)  (unknown)  Medications  (units    (un

known)



                    date)                                   unknown)  

 

           (unknown)  (no       (unknown)  (unknown)  Mental Status:  (units    

(unknown)



                    date)                         mental status unknown)  



                                                  grossly normal           

 

           (unknown)  (no       (unknown)  (unknown)  Mood: congruent  (units   

 (unknown)



                    date)                         mood      unknown)  

 

           (unknown)  (no       (unknown)  (unknown)  Neck      (units    (unkno

wn)



                    date)                                   unknown)  

 

           (unknown)  (no       (unknown)  (unknown)  Neck: normal visual  (unit

s    (unknown)



                    date)                         inspection unknown)  

 

           (unknown)  (no       (unknown)  (unknown)  New       (units    (unkno

wn)



                    date)                                   unknown)  

 

           (unknown)  (no       (unknown)  (unknown)  No cul-de-sac  (units    (

unknown)



                    date)                         fullness and No unknown)  



                                                  cul-de-sac           



                                                  tenderness           

 

           (unknown)  (no       (unknown)  (unknown)  Nutritional  (units    (un

known)



                    date)                         Appearance: average unknown)  



                                                  body habitus           

 

           (unknown)  (no       (unknown)  (unknown)  Opioids - Morphine  (units

    (unknown)



                    date)                         Analogues Allergy unknown)  



                                                  (Intermediate,           



                                                  Verified 22           



                                                  08:15)              

 

           (unknown)  (no       (unknown)  (unknown)  Orientation: alert  (units

    (unknown)



                    date)                         and oriented x3 unknown)  

 

           (unknown)  (no       (unknown)  (unknown)  Other Menstrual  (units   

 (unknown)



                    date)                         Period: Other unknown)  

 

           (unknown)  (no       (unknown)  (unknown)  Ovarian cyst  (units    (u

nknown)



                    date)                                   unknown)  

 

           (unknown)  (no       (unknown)  (unknown)  PFSH      (units    (unkno

wn)



                    date)                                   unknown)  

 

           (unknown)  (no       (unknown)  (unknown)  Painful menstrual  (units 

   (unknown)



                    date)                         periods (-) unknown)  

 

           (unknown)  (no       (unknown)  (unknown)  Palpation: soft, no  (unit

s    (unknown)



                    date)                         hepatosplenomegaly unknown)  



                                                  and tender (Moderate          

 



                                                  direct tenderness)           

 

           (unknown)  (no       (unknown)  (unknown)  Patient:  (units    (unkno

wn)



                    date)                         More,Kateryna MR#: unknown)  



                                                  M000                

 

           (unknown)  (no       (unknown)  (unknown)  Pelvic Support:  (units   

 (unknown)



                    date)                         normal    unknown)  

 

           (unknown)  (no       (unknown)  (unknown)  Position Sitting  (units  

  (unknown)



                    date)                                   unknown)  

 

           (unknown)  (no       (unknown)  (unknown)  Problem-specific  (units  

  (unknown)



                    date)                         ROS positives unknown)  



                                                  included with the           



                                                  HPI                 

 

           (unknown)  (no       (unknown)  (unknown)  Psych     (units    (unkno

wn)



                    date)                                   unknown)  

 

           (unknown)  (no       (unknown)  (unknown)  ROS       (units    (unkno

wn)



                    date)                                   unknown)  

 

           (unknown)  (no       (unknown)  (unknown)  ROS Narrative  (units    (

unknown)



                    date)                                   unknown)  

 

           (unknown)  (no       (unknown)  (unknown)  ROS Narrative:  (units    

(unknown)



                    date)                                   unknown)  

 

           (unknown)  (no       (unknown)  (unknown)  Reason For Visit  (units  

  (unknown)



                    date)                                   unknown)  

 

           (unknown)  (no       (unknown)  (unknown)  Recto-Vaginal: no  (units 

   (unknown)



                    date)                         cul-de-sac fullness unknown)  



                                                  and no cul-de-sac           



                                                  tenderness           

 

           (unknown)  (no       (unknown)  (unknown)  Resp      (units    (unkno

wn)



                    date)                                   unknown)  

 

           (unknown)  (no       (unknown)  (unknown)  S/P ACL   (units    (o

wn)



                    date)                         reconstruction unknown)  



                                                  (-21)           

 

           (unknown)  (no       (unknown)  (unknown)  Sclera: sclerae  (units   

 (unknown)



                    date)                         normal    unknown)  

 

           (unknown)  (no       (unknown)  (unknown)  Signed By:  (units    (unk

nown)



                    date)                                   unknown)  

 

           (unknown)  (no       (unknown)  (unknown)  Smoking Status:  (units   

 (unknown)



                    date)                         Never smoker unknown)  

 

           (unknown)  (no       (unknown)  (unknown)  Speculum Exam -  (units   

 (unknown)



                    date)                         Cervix: normal unknown)  



                                                  appearance of the           



                                                  cervix and tender           

 

           (unknown)  (no       (unknown)  (unknown)  Speculum Exam -  (units   

 (unknown)



                    date)                         Vagina: normal unknown)  



                                                  appearance of the           



                                                  vagina and abnormal           



                                                  vaginal             

 

           (unknown)  (no       (unknown)  (unknown)  Speech and  (units    (unk

nown)



                    date)                         Movement: speech and unknown) 

 



                                                  movement normal           

 

           (unknown)  (no       (unknown)  (unknown)  Surgical History  (units  

  (unknown)



                    date)                         (Updated 22 @ unknown)  



                                                  14:51 by Denia Faustin MD)           

 

           (unknown)  (no       (unknown)  (unknown)  TOA (tubo-ovarian  (units 

   (unknown)



                    date)                         abscess) (-21) unknown) 

 

 

           (unknown)  (no       (unknown)  (unknown)  This note may have  (units

    (unknown)



                    date)                         been all or unknown)  



                                                  partially generated           



                                                  using voice           



                                                  recognition           

 

           (unknown)  (no       (unknown)  (unknown)  Thought Content:  (units  

  (unknown)



                    date)                         normal    unknown)  

 

           (unknown)  (no       (unknown)  (unknown)  Thought Process:  (units  

  (unknown)



                    date)                         normal    unknown)  

 

           (unknown)  (no       (unknown)  (unknown)  Tobacco + Substance  (unit

s    (unknown)



                    date)                         Use       unknown)  

 

           (unknown)  (no       (unknown)  (unknown)  Tobacco Status  (units    

(unknown)



                    date)                                   unknown)  

 

           (unknown)  (no       (unknown)  (unknown)  Urethra: normal  (units   

 (unknown)



                    date)                         appearance of the unknown)  



                                                  urethra             

 

           (unknown)  (no       (unknown)  (unknown)  Visit Reasons:  (units    

(unknown)



                    date)                         Ovarian Cyst/Pelvic unknown)  



                                                  Pain                

 

           (unknown)  (no       (unknown)  (unknown)  Vitals    (units    (unkno

wn)



                    date)                                   unknown)  

 

           (unknown)  (no       (unknown)  (unknown)  Weight 153 lb  (units    (

unknown)



                    date)                                   unknown)  

 

           (unknown)  (no       (unknown)  (unknown)  abdominal/pelvic CT  (unit

s    (unknown)



                    date)                         and pelvic unknown)  



                                                  ultrasound which           



                                                  were largely           



                                                  unremarkable with           

 

           (unknown)  (no       (unknown)  (unknown)  abscess due to  (units    

(unknown)



                    date)                         chlamydia treated as unknown) 

 



                                                  an inpatient at           



                                                  [facility]             

 

           (unknown)  (no       (unknown)  (unknown)  and therefore  (units    (

unknown)



                    date)                         discontinue the oral unknown) 

 



                                                  antibiotics. As a           



                                                  result she only had           

 

           (unknown)  (no       (unknown)  (unknown)  back in December  (units  

  (unknown)



                    date)                         . No records are unknown) 

 



                                                  available for review          

 



                                                  of that             

 

           (unknown)  (no       (unknown)  (unknown)  discharge (C/W BV)  (units

    (unknown)



                    date)                         white     unknown)  

 

           (unknown)  (no       (unknown)  (unknown)  doxycycline Adverse  (unit

s    (unknown)



                    date)                         Reaction  unknown)  



                                                  (Intermediate,           



                                                  Verified 22           



                                                  08:15)              

 

           (unknown)  (no       (unknown)  (unknown)  following IV  (units    (u

nknown)



                    date)                         antibiotics but unknown)  



                                                  apparently had an           



                                                  adverse reaction to           



                                                  Vibramycin           

 

           (unknown)  (no       (unknown)  (unknown)  had US and CT scan.  (unit

s    (unknown)



                    date)                                   unknown)  

 

           (unknown)  (no       (unknown)  (unknown)  has had only very  (units 

   (unknown)



                    date)                         irregular cycles unknown)  



                                                  since the TOA or as           



                                                  she had regular           

 

           (unknown)  (no       (unknown)  (unknown)  have occurred. If  (units 

   (unknown)



                    date)                         there are any unknown)  



                                                  questions, please           



                                                  contact the Medical           



                                                  Records             

 

           (unknown)  (no       (unknown)  (unknown)  hospitalization but  (unit

s    (unknown)



                    date)                         apparently she was unknown)  



                                                  treated with IV           



                                                  antibiotics and           

 

           (unknown)  (no       (unknown)  (unknown)  in the RLQ; with no  (unit

s    (unknown)



                    date)                         rebound tenderness unknown)  

 

           (unknown)  (no       (unknown)  (unknown)  intravenous  (units    (un

known)



                    date)                         antibiotic therapy unknown)  



                                                  and no follow-up           



                                                  oral antibiotic           



                                                  therapy. Since           

 

           (unknown)  (no       (unknown)  (unknown)  levofloxacin 500 mg  (unit

s    (unknown)



                    date)                         PO DAILY 14 days 14 unknown)  



                                                  tabs 0RF            

 

           (unknown)  (no       (unknown)  (unknown)  levofloxacin 500 mg  (unit

s    (unknown)



                    date)                         tablet 500 mg PO unknown)  



                                                  DAILY 14 days #14           



                                                  tabs 22 [Rx           

 

           (unknown)  (no       (unknown)  (unknown)  may occur.  (units    (unk

nown)



                    date)                         Occasional unknown)  



                                                  wrong-word or           



                                                  'sound-alike'           



                                                  substitutions may           



                                                  have                

 

           (unknown)  (no       (unknown)  (unknown)  metronidazole 500  (units 

   (unknown)



                    date)                         mg PO BID 14 days 28 unknown) 

 



                                                  tabs 0RF            

 

           (unknown)  (no       (unknown)  (unknown)  metronidazole 500  (units 

   (unknown)



                    date)                         mg tablet 500 mg PO unknown)  



                                                  BID 14 days #28 tabs          

 



                                                  22 [Rx           

 

           (unknown)  (no       (unknown)  (unknown)  month history of  (units  

  (unknown)



                    date)                         right lower quadrant unknown) 

 



                                                  pain following           



                                                  right-sided           



                                                  tubo-ovarian           

 

           (unknown)  (no       (unknown)  (unknown)  occurred due to the  (unit

s    (unknown)



                    date)                         inherent limitations unknown) 

 



                                                  of voice recognition          

 



                                                  software. Please           

 

           (unknown)  (no       (unknown)  (unknown)  percutaneous  (units    (u

nknown)



                    date)                         drainage of the unknown)  



                                                  abscess. She was           



                                                  placed on oral           



                                                  antibiotics           

 

           (unknown)  (no       (unknown)  (unknown) predictable periods  (units

    (unknown)



                    date)                         up until that time. unknown)  



                                                  She presents today           



                                                  in follow-up from an          

 

 

           (unknown)  (no       (unknown)  (unknown) promethazine 25 mg  (units 

   (unknown)



                    date)                         tablet 25 mg PO Q6H unknown)  



                                                  PRN 22           



                                                  [History Confirmed           



                                                  22]           

 

           (unknown)  (no       (unknown)  (unknown) pt here for ovarian  (units

    (unknown)



                    date)                         cyst/pelvic pain. unknown)  



                                                  was seen in ER at           



                                                  [facility] on Wednesday          

 



                                                  and                 

 

           (unknown)  (no       (unknown)  (unknown)  read the note  (units    (

unknown)



                    date)                         carefully and unknown)  



                                                  recognize, using           



                                                  context, where these          

 



                                                  substitutions           

 

           (unknown)  (no       (unknown)  (unknown)  sentences  (units    (unkn

own)



                    date)                                   unknown)  

 

           (unknown)  (no       (unknown)  (unknown)  software. Although  (units

    (unknown)



                    date)                         every effort is made unknown) 

 



                                                  to edit content,           



                                                  transcription errors          

 

 

           (unknown)  (no       (unknown)  (unknown)  tender, cervical  (units  

  (unknown)



                    date)                         motion tenderness unknown)  



                                                  and tender (Mild,           



                                                  diffuse)            

 

           (unknown)  (no       (unknown)  (unknown) that time she is had  (unit

s    (unknown)



                    date)                         persistent right unknown)  



                                                  lower quadrant pain           



                                                  which is noncyclic           



                                                  and                 

 

           (unknown)  (no       (unknown)  (unknown)  the exception of a  (units

    (unknown)



                    date)                         3 cm hemorrhagic unknown)  



                                                  cyst within the           



                                                  right ovary.           

 

           (unknown)  (no       (unknown)  (unknown)  urethra   (units    (unkno

wn)



                    date)                                   unknown)  









                                         Result panel 6









           (unknown)  (no       (unknown)  (unknown)  (no value)  (units    (unk

nown)



                    date)                                   unknown)  

 

           (unknown)  (no       (unknown)  (unknown)  Medications:  (units    (u

nknown)



                    date)                                   unknown)  

 

           (unknown)  (no       (unknown)  (unknown)  Status: Acute  (units    (

unknown)



                    date)                                   unknown)  

 

           (unknown)  (no       (unknown)  (unknown)  (no value)  (units    (unk

nown)



                    date)                                   unknown)  

 

           (unknown)  (no       (unknown)  (unknown)  22  (units    (unkno

wn)



                    date)                                   unknown)  

 

           (unknown)  (no       (unknown)  (unknown)  22 0914  (units    (

unknown)



                    date)                                   unknown)  

 

           (unknown)  (no       (unknown)  (unknown)  08:15     (units    (unkno

wn)



                    date)                                   unknown)  

 

           (unknown)  (no       (unknown)  (unknown)  [facility] [address]  (unit

s    (unknown)



                    date)                                   unknown)  

 

           (unknown)  (no       (unknown)  (unknown)  [facility]  (units   

 (unknown)



                    date)                         [facility] unknown)  

 

           (unknown)  (no       (unknown)  (unknown)  Gynecology Visit  (units  

  (unknown)



                    date)                                   unknown)  

 

           (unknown)  (no       (unknown)  (unknown)  Signed    (units    (unkno

wn)



                    date)                                   unknown)  

 

           (unknown)  (no       (unknown)  (unknown)  rash      (units    (unkno

wn)



                    date)                                   unknown)  

 

           (unknown)  (no       (unknown)  (unknown)  vomitt    (units    (unkno

wn)



                    date)                                   unknown)  

 

           (unknown)  (no       (unknown)  (unknown)  (no value)  (units    (unk

nown)



                    date)                                   unknown)  

 

           (unknown)  (no       (unknown)  (unknown)  (1) Chronic right  (units 

   (unknown)



                    date)                         lower quadrant pain: unknown) 

 

 

           (unknown)  (no       (unknown)  (unknown)  (2) Chronic  (units    (un

known)



                    date)                         salpingitis: unknown)  

 

           (unknown)  (no       (unknown)  (unknown)  (3) Irregular  (units    (

unknown)



                    date)                         menstrual cycle: unknown)  

 

           (unknown)  (no       (unknown)  (unknown)  (4) Bacterial  (units    (

unknown)



                    date)                         vaginosis: unknown)  

 

           (unknown)  (no       (unknown)  (unknown)  22  (units    (unkno

wn)



                    date)                                   unknown)  

 

           (unknown)  (no       (unknown)  (unknown)  22]  (units    (unkn

own)



                    date)                                   unknown)  

 

           (unknown)  (no       (unknown)  (unknown)  3 weeks for  (units    (un

known)



                    date)                         re-evaluation unknown)  



                                                  insofar as her           



                                                  response to therapy.          

 



                                                  If her symptoms           

 

           (unknown)  (no       (unknown)  (unknown)  [id number]    (units    (unkno

wn)



                    date)                                   unknown)  

 

           (unknown)  (no       (unknown)  (unknown)  500 mg p.o. b.i.d.  (units

    (unknown)



                    date)                         times 14 days. In unknown)  



                                                  addition will           



                                                  prescribe oxycodone           



                                                  5 mg                

 

           (unknown)  (no       (unknown)  (unknown)  Acne (-)  (units    (u

nknown)



                    date)                                   unknown)  

 

           (unknown)  (no       (unknown)  (unknown)  Affect: normal  (units    

(unknown)



                    date)                         affect    unknown)  

 

           (unknown)  (no       (unknown)  (unknown)  Age/Sex: 17 / F  (units   

 (unknown)



                    date)                         Date of Service: unknown)  

 

           (unknown)  (no       (unknown)  (unknown)  Allergies  (units    (unkn

own)



                    date)                                   unknown)  

 

           (unknown)  (no       (unknown)  (unknown)  Anesthesia  (units    (unk

nown)



                    date)                                   unknown)  

 

           (unknown)  (no       (unknown)  (unknown)  Appearance: grossly  (unit

s    (unknown)



                    date)                         normal    unknown)  

 

           (unknown)  (no       (unknown)  (unknown)  Assessment + Plan  (units 

   (unknown)



                    date)                                   unknown)  

 

           (unknown)  (no       (unknown)  (unknown)  Attending Dr:  (units    (

unknown)



                    date)                         Jose Almodovar MD unknown)  

 

           (unknown)  (no       (unknown)  (unknown)  Attitude:  (units    (unkn

own)



                    date)                         cooperative unknown)  

 

           (unknown)  (no       (unknown)  (unknown)  BMI 26.6  (units    (unkno

wn)



                    date)                                   unknown)  

 

           (unknown)  (no       (unknown)  (unknown)  BP 98/60  (units    (unkno

wn)



                    date)                                   unknown)  

 

           (unknown)  (no       (unknown)  (unknown)  Bimanual Exam-  (units    

(unknown)



                    date)                         Adnexa, other: no unknown)  



                                                  masses, normal,           



                                                  tender (Marked,           



                                                  right sided),           

 

           (unknown)  (no       (unknown)  (unknown)  Bimanual Exam-  (units    

(unknown)



                    date)                         Vagina + Uterus: unknown)  



                                                  uterine size normal,          

 



                                                  uterine mobility           



                                                  normal,             

 

           (unknown)  (no       (unknown)  (unknown)  Blood Pressure  (units    

(unknown)



                    date)                         Location Lt brachial unknown) 

 

 

           (unknown)  (no       (unknown)  (unknown)  Chief Complaint  (units   

 (unknown)



                    date)                                   unknown)  

 

           (unknown)  (no       (unknown)  (unknown)  Chief Complaint:  (units  

  (unknown)



                    date)                         Right lower quadrant unknown) 

 



                                                  pain                

 

           (unknown)  (no       (unknown)  (unknown)  Chlamydia infection  (unit

s    (unknown)



                    date)                         ()   unknown)  

 

           (unknown)  (no       (unknown)  (unknown)  Confirmed 22]  (unit

s    (unknown)



                    date)                                   unknown)  

 

           (unknown)  (no       (unknown)  (unknown)  Conjunctivae:  (units    (

unknown)



                    date)                         conjunctivae normal unknown)  

 

           (unknown)  (no       (unknown)  (unknown)  Const     (units    (unkno

wn)



                    date)                                   unknown)  

 

           (unknown)  (no       (unknown)  (unknown)  Counseling and  (units    

(unknown)



                    date)                         educating the unknown)  



                                                  patient/family/careg          

 



                                                  iver: 10            

 

           (unknown)  (no       (unknown)  (unknown)  : 2005  (units   

 (unknown)



                    date)                         Acct:[id number] unknown)  

 

           (unknown)  (no       (unknown)  (unknown)  Dept at   (units    (unkno

wn)



                    date)                         [phone]. unknown)  

 

           (unknown)  (no       (unknown)  (unknown)  Details:  (units    (unkno

wn)



                    date)                                   unknown)  

 

           (unknown)  (no       (unknown)  (unknown)  Documented By:  (units    

(unknown)



                    date)                         Jose Almodovar MD unknown)  



                                                  22 0803           

 

           (unknown)  (no       (unknown)  (unknown)  Documenting  (units    (un

known)



                    date)                         clinical information unknown) 

 



                                                  in EHR/Medical           



                                                  record: 10           

 

           (unknown)  (no       (unknown)  (unknown)  EOM: EOM intact  (units   

 (unknown)



                    date)                         bilaterally unknown)  

 

           (unknown)  (no       (unknown)  (unknown)  ER visit at [facility]  (unit

s    (unknown)



                    date)                         [facility] on unknown)  



                                                  2022 at which           



                                                  time she underwent           

 

           (unknown)  (no       (unknown)  (unknown)  Ears: hearing  (units    (

unknown)



                    date)                         grossly normal unknown)  



                                                  bilaterally           

 

           (unknown)  (no       (unknown)  (unknown)  Effort +  (units    (unkno

wn)



                    date)                         Inspection: normal unknown)  



                                                  respiratory effort           



                                                  and able to speak in          

 



                                                  complete            

 

           (unknown)  (no       (unknown)  (unknown)  Endometriosis  (units    (

unknown)



                    date)                         ()   unknown)  

 

           (unknown)  (no       (unknown)  (unknown)  Exam      (units    (unkno

wn)



                    date)                                   unknown)  

 

           (unknown)  (no       (unknown)  (unknown)  External Female  (units   

 (unknown)



                    date)                         Exam: normal unknown)  



                                                  external appearance           



                                                  and normal           



                                                  appearance of the           

 

           (unknown)  (no       (unknown)  (unknown)  Eyes      (units    (unkno

wn)



                    date)                                   unknown)  

 

           (unknown)  (no       (unknown)  (unknown)  Face and sinus:  (units   

 (unknown)



                    date)                         face symmetric unknown)  

 

           (unknown)  (no       (unknown)  (unknown)  GI        (units    (unkno

wn)



                    date)                                   unknown)  

 

           (unknown)  (no       (unknown)  (unknown)          (units    (unkno

wn)



                    date)                                   unknown)  

 

           (unknown)  (no       (unknown)  (unknown)  General: appearance  (unit

s    (unknown)



                    date)                         normal, both eyes unknown)  



                                                  and all related           



                                                  structures           

 

           (unknown)  (no       (unknown)  (unknown)  General:  (units    (unkno

wn)



                    date)                         cooperative, unknown)  



                                                  comfortable and no           



                                                  acute distress           

 

           (unknown)  (no       (unknown)  (unknown)  General: deferred  (units 

   (unknown)



                    date)                                   unknown)  

 

           (unknown)  (no       (unknown)  (unknown)  Kateryna is a  (units    (unk

nown)



                    date)                         17-year-old unknown)  



                                                  nulligravida, LMP in          

 



                                                  May 2022 who           



                                                  presents with an 8           

 

           (unknown)  (no       (unknown)  (unknown)  HENMT     (units    (unkno

wn)



                    date)                                   unknown)  

 

           (unknown)  (no       (unknown)  (unknown)  HPI       (units    (unkno

wn)



                    date)                                   unknown)  

 

           (unknown)  (no       (unknown)  (unknown)  Head: normal to  (units   

 (unknown)



                    date)                         inspection, unknown)  



                                                  normocephalic and           



                                                  atraumatic           

 

           (unknown)  (no       (unknown)  (unknown)  Heavy menstrual  (units   

 (unknown)



                    date)                         period (-) unknown)  

 

           (unknown)  (no       (unknown)  (unknown)  Height 5 ft 3.5 in  (units

    (unknown)



                    date)                                   unknown)  

 

           (unknown)  (no       (unknown)  (unknown)  Inspection: normal  (units

    (unknown)



                    date)                         to inspection unknown)  

 

           (unknown)  (no       (unknown)  (unknown)  Intake    (units    (unkno

wn)



                    date)                                   unknown)  

 

           (unknown)  (no       (unknown)  (unknown)  Intake Note:  (units    (u

nknown)



                    date)                                   unknown)  

 

           (unknown)  (no       (unknown)  (unknown)  Intake performed  (units  

  (unknown)



                    date)                         by: Marcela Mcfarlane unknown)  

 

           (unknown)  (no       (unknown)  (unknown)  Intake- Clincial  (units  

  (unknown)



                    date)                         Staff     unknown)  

 

           (unknown)  (no       (unknown)  (unknown)  Irregular menstrual  (unit

s    (unknown)



                    date)                         cycle () unknown)  

 

           (unknown)  (no       (unknown)  (unknown)  Judgment: judgment  (units

    (unknown)



                    date)                         good      unknown)  

 

           (unknown)  (no       (unknown)  (unknown)  Last Menstural  (units    

(unknown)



                    date)                         Cycle + Details unknown)  

 

           (unknown)  (no       (unknown)  (unknown)  Loc: [facility]  (units    (unkno

wn)



                    date)                                   unknown)  

 

           (unknown)  (no       (unknown)  (unknown)  Lymphangioma  (units    (u

nknown)



                    date)                                   unknown)  

 

           (unknown)  (no       (unknown)  (unknown)  Medical History  (units   

 (unknown)



                    date)                         (Updated 22 @ unknown)  



                                                  09:12 by Jose Almodovar MD)           

 

           (unknown)  (no       (unknown)  (unknown)  Medications  (units    (un

known)



                    date)                                   unknown)  

 

           (unknown)  (no       (unknown)  (unknown)  Mental Status:  (units    

(unknown)



                    date)                         mental status unknown)  



                                                  grossly normal           

 

           (unknown)  (no       (unknown)  (unknown)  Mood: congruent  (units   

 (unknown)



                    date)                         mood      unknown)  

 

           (unknown)  (no       (unknown)  (unknown)  Neck      (units    (unkno

wn)



                    date)                                   unknown)  

 

           (unknown)  (no       (unknown)  (unknown)  Neck: normal visual  (unit

s    (unknown)



                    date)                         inspection unknown)  

 

           (unknown)  (no       (unknown)  (unknown)  New       (units    (unkno

wn)



                    date)                                   unknown)  

 

           (unknown)  (no       (unknown)  (unknown)  No cul-de-sac  (units    (

unknown)



                    date)                         fullness and No unknown)  



                                                  cul-de-sac           



                                                  tenderness           

 

           (unknown)  (no       (unknown)  (unknown)  Nutritional  (units    (un

known)



                    date)                         Appearance: average unknown)  



                                                  body habitus           

 

           (unknown)  (no       (unknown)  (unknown)  Obtaining and/or  (units  

  (unknown)



                    date)                         reviewing separately unknown) 

 



                                                  obtained history: 5           

 

           (unknown)  (no       (unknown)  (unknown)  Opioids - Morphine  (units

    (unknown)



                    date)                         Analogues Allergy unknown)  



                                                  (Intermediate,           



                                                  Verified 22           



                                                  08:15)              

 

           (unknown)  (no       (unknown)  (unknown)  Ordering  (units    (unkno

wn)



                    date)                         medications, tests, unknown)  



                                                  or procedures: 5           

 

           (unknown)  (no       (unknown)  (unknown)  Orientation: alert  (units

    (unknown)



                    date)                         and oriented x3 unknown)  

 

           (unknown)  (no       (unknown)  (unknown)  Other Menstrual  (units   

 (unknown)



                    date)                         Period: Other unknown)  

 

           (unknown)  (no       (unknown)  (unknown)  Ovarian cyst  (units    (u

nknown)



                    date)                                   unknown)  

 

           (unknown)  (no       (unknown)  (unknown)  PFSH      (units    (unkno

wn)



                    date)                                   unknown)  

 

           (unknown)  (no       (unknown)  (unknown)  Painful menstrual  (units 

   (unknown)



                    date)                         periods () unknown)  

 

           (unknown)  (no       (unknown)  (unknown)  Palpation: soft, no  (unit

s    (unknown)



                    date)                         hepatosplenomegaly unknown)  



                                                  and tender (Moderate          

 



                                                  direct tenderness)           

 

           (unknown)  (no       (unknown)  (unknown)  Patient's clinical  (units

    (unknown)



                    date)                         symptoms and unknown)  



                                                  physical findings           



                                                  are most consistent           



                                                  with                

 

           (unknown)  (no       (unknown)  (unknown)  Patient:  (units    (unkno

wn)



                    date)                         Kateryna Jenkins MR#: unknown)  



                                                  M000                

 

           (unknown)  (no       (unknown)  (unknown)  Pelvic Support:  (units   

 (unknown)



                    date)                         normal    unknown)  

 

           (unknown)  (no       (unknown)  (unknown)  Performing a  (units    (u

nknown)



                    date)                         medically unknown)  



                                                  appropriate exam           



                                                  and/or evaluation: 5          

 

 

           (unknown)  (no       (unknown)  (unknown)  Plan      (units    (unkno

wn)



                    date)                                   unknown)  

 

           (unknown)  (no       (unknown)  (unknown)  Position Sitting  (units  

  (unknown)



                    date)                                   unknown)  

 

           (unknown)  (no       (unknown)  (unknown)  Preparing to see  (units  

  (unknown)



                    date)                         the patient, i.e., unknown)  



                                                  chart review, review          

 



                                                  of tests: 5           

 

           (unknown)  (no       (unknown)  (unknown)  Problem-specific  (units  

  (unknown)



                    date)                         ROS positives unknown)  



                                                  included with the           



                                                  HPI                 

 

           (unknown)  (no       (unknown)  (unknown)  Psych     (units    (unkno

wn)



                    date)                                   unknown)  

 

           (unknown)  (no       (unknown)  (unknown)  ROS       (units    (unkno

wn)



                    date)                                   unknown)  

 

           (unknown)  (no       (unknown)  (unknown)  ROS Narrative  (units    (

unknown)



                    date)                                   unknown)  

 

           (unknown)  (no       (unknown)  (unknown)  ROS Narrative:  (units    

(unknown)



                    date)                                   unknown)  

 

           (unknown)  (no       (unknown)  (unknown)  Reason For Visit  (units  

  (unknown)



                    date)                                   unknown)  

 

           (unknown)  (no       (unknown)  (unknown)  Recto-Vaginal: no  (units 

   (unknown)



                    date)                         cul-de-sac fullness unknown)  



                                                  and no cul-de-sac           



                                                  tenderness           

 

           (unknown)  (no       (unknown)  (unknown)  Resp      (units    (unkno

wn)



                    date)                                   unknown)  

 

           (unknown)  (no       (unknown)  (unknown)  S/P ACL   (units    (unkno

wn)



                    date)                         reconstruction unknown)  



                                                  (-21)           

 

           (unknown)  (no       (unknown)  (unknown)  Sclera: sclerae  (units   

 (unknown)



                    date)                         normal    unknown)  

 

           (unknown)  (no       (unknown)  (unknown)  Signed By:  (units    (unk

nown)



                    date)                         <Electronically unknown)  



                                                  signed by Jose Almodovar MD>           

 

           (unknown)  (no       (unknown)  (unknown)  Smoking Status:  (units   

 (unknown)



                    date)                         Never smoker unknown)  

 

           (unknown)  (no       (unknown)  (unknown)  Speculum Exam -  (units   

 (unknown)



                    date)                         Cervix: normal unknown)  



                                                  appearance of the           



                                                  cervix and tender           

 

           (unknown)  (no       (unknown)  (unknown)  Speculum Exam -  (units   

 (unknown)



                    date)                         Vagina: normal unknown)  



                                                  appearance of the           



                                                  vagina and abnormal           



                                                  vaginal             

 

           (unknown)  (no       (unknown)  (unknown)  Speech and  (units    (unk

nown)



                    date)                         Movement: speech and unknown) 

 



                                                  movement normal           

 

           (unknown)  (no       (unknown)  (unknown)  Surgical History  (units  

  (unknown)



                    date)                         (Updated 22 @ unknown)  



                                                  14:51 by Denia Faustin MD)           

 

           (unknown)  (no       (unknown)  (unknown)  TOA (tubo-ovarian  (units 

   (unknown)



                    date)                         abscess) (-21) unknown) 

 

 

           (unknown)  (no       (unknown)  (unknown)  This note may have  (units

    (unknown)



                    date)                         been all or unknown)  



                                                  partially generated           



                                                  using voice           



                                                  recognition           

 

           (unknown)  (no       (unknown)  (unknown)  Thought Content:  (units  

  (unknown)



                    date)                         normal    unknown)  

 

           (unknown)  (no       (unknown)  (unknown)  Thought Process:  (units  

  (unknown)



                    date)                         normal    unknown)  

 

           (unknown)  (no       (unknown)  (unknown)  Time Coding Minutes  (unit

s    (unknown)



                    date)                         Spent: (must be on unknown)  



                                                  same date of           



                                                  service/appointment)          

 

 

           (unknown)  (no       (unknown)  (unknown)  Time Spent  (units    (unk

nown)



                    date)                                   unknown)  

 

           (unknown)  (no       (unknown)  (unknown)  Tobacco + Substance  (unit

s    (unknown)



                    date)                         Use       unknown)  

 

           (unknown)  (no       (unknown)  (unknown)  Tobacco Status  (units    

(unknown)



                    date)                                   unknown)  

 

           (unknown)  (no       (unknown)  (unknown)  Total Time: 40  (units    

(unknown)



                    date)                                   unknown)  

 

           (unknown)  (no       (unknown)  (unknown)  Urethra: normal  (units   

 (unknown)



                    date)                         appearance of the unknown)  



                                                  urethra             

 

           (unknown)  (no       (unknown)  (unknown)  Visit Reasons:  (units    

(unknown)



                    date)                         Ovarian Cyst/Pelvic unknown)  



                                                  Pain                

 

           (unknown)  (no       (unknown)  (unknown)  Vitals    (units    (unkno

wn)



                    date)                                   unknown)  

 

           (unknown)  (no       (unknown)  (unknown)  Weight 153 lb  (units    (

unknown)



                    date)                                   unknown)  

 

           (unknown)  (no       (unknown)  (unknown)  Will initiate  (units    (

unknown)



                    date)                         levofloxacin 500 mg unknown)  



                                                  daily times 14 days           



                                                  along with           



                                                  metronidazole           

 

           (unknown)  (no       (unknown)  (unknown)  abdominal/pelvic CT  (unit

s    (unknown)



                    date)                         and pelvic unknown)  



                                                  ultrasound which           



                                                  were largely           



                                                  unremarkable with           

 

           (unknown)  (no       (unknown)  (unknown)  abscess due to  (units    

(unknown)



                    date)                         chlamydia treated as unknown) 

 



                                                  an inpatient at           



                                                  [facility]             

 

           (unknown)  (no       (unknown)  (unknown)  and therefore  (units    (

unknown)



                    date)                         discontinue the oral unknown) 

 



                                                  antibiotics. As a           



                                                  result she only had           

 

           (unknown)  (no       (unknown)  (unknown)  ay occur.  (units    (unkn

own)



                    date)                         Occasional unknown)  



                                                  wrong-word or           



                                                  'sound-alike'           



                                                  substitutions may           



                                                  have                

 

           (unknown)  (no       (unknown)  (unknown)  back in December  (units  

  (unknown)



                    date)                         . No records are unknown) 

 



                                                  available for review          

 



                                                  of that             

 

           (unknown)  (no       (unknown)  (unknown)  chronic right-sided  (unit

s    (unknown)



                    date)                         salpingitis/oophorit unknown) 

 



                                                  is following           



                                                  incomplete treatment          

 



                                                  of                  

 

           (unknown)  (no       (unknown)  (unknown)  discharge (C/W BV)  (units

    (unknown)



                    date)                         white     unknown)  

 

           (unknown)  (no       (unknown)  (unknown)  doxycycline Adverse  (unit

s    (unknown)



                    date)                         Reaction  unknown)  



                                                  (Intermediate,           



                                                  Verified 22           



                                                  08:15)              

 

           (unknown)  (no       (unknown)  (unknown)  following IV  (units    (u

nknown)



                    date)                         antibiotics but unknown)  



                                                  apparently had an           



                                                  adverse reaction to           



                                                  Vibramycin           

 

           (unknown)  (no       (unknown)  (unknown)  had US and CT scan.  (unit

s    (unknown)



                    date)                                   unknown)  

 

           (unknown)  (no       (unknown)  (unknown)  has had only very  (units 

   (unknown)



                    date)                         irregular cycles unknown)  



                                                  since the TOA or as           



                                                  she had regular           

 

           (unknown)  (no       (unknown)  (unknown)  have occurred. If  (units 

   (unknown)



                    date)                         there are any unknown)  



                                                  questions, please           



                                                  contact the Medical           



                                                  Records             

 

           (unknown)  (no       (unknown)  (unknown)  hospitalization but  (unit

s    (unknown)



                    date)                         apparently she was unknown)  



                                                  treated with IV           



                                                  antibiotics and           

 

           (unknown)  (no       (unknown)  (unknown)  improve   (units    (unkno

wn)



                    date)                         sufficiently, no unknown)  



                                                  further therapy will          

 



                                                  be needed but           



                                                  response is           

 

           (unknown)  (no       (unknown)  (unknown)  in the RLQ; with no  (unit

s    (unknown)



                    date)                         rebound tenderness unknown)  

 

           (unknown)  (no       (unknown)  (unknown)  intravenous  (units    (un

known)



                    date)                         antibiotic therapy unknown)  



                                                  and no follow-up           



                                                  oral antibiotic           



                                                  therapy. Since           

 

           (unknown)  (no       (unknown)  (unknown)  lead to near  (units    (u

nknown)



                    date)                         immediate unknown)  



                                                  discontinuation of           



                                                  oral antibiotics but          

 



                                                  she was not           

 

           (unknown)  (no       (unknown)  (unknown)  levofloxacin 500 mg  (unit

s    (unknown)



                    date)                         PO DAILY 14 days 14 unknown)  



                                                  tabs 0RF            

 

           (unknown)  (no       (unknown)  (unknown)  levofloxacin 500 mg  (unit

s    (unknown)



                    date)                         tablet 500 mg PO unknown)  



                                                  DAILY 14 days #14           



                                                  tabs 22 [Rx           

 

           (unknown)  (no       (unknown)  (unknown)  metronidazole 500  (units 

   (unknown)



                    date)                         mg PO BID 14 days 28 unknown) 

 



                                                  tabs 0RF            

 

           (unknown)  (no       (unknown)  (unknown)  metronidazole 500  (units 

   (unknown)



                    date)                         mg tablet 500 mg PO unknown)  



                                                  BID 14 days #28 tabs          

 



                                                  22 [Rx           

 

           (unknown)  (no       (unknown)  (unknown)  month history of  (units  

  (unknown)



                    date)                         right lower quadrant unknown) 

 



                                                  pain following           



                                                  right-sided           



                                                  tubo-ovarian           

 

           (unknown)  (no       (unknown)  (unknown)  occurred due to the  (unit

s    (unknown)



                    date)                         inherent limitations unknown) 

 



                                                  of voice recognition          

 



                                                  software. Please           

 

           (unknown)  (no       (unknown)  (unknown)  oxycodone 5 mg PO  (units 

   (unknown)



                    date)                         Q6H PRN 10 tabs 0RF unknown)  



                                                  pain                

 

           (unknown)  (no       (unknown)  (unknown)  oxycodone 5 mg  (units    

(unknown)



                    date)                         tablet 5 mg PO Q6H unknown)  



                                                  PRN pain #10 tabs           



                                                  22 [Rx           



                                                  Confirmed           

 

           (unknown)  (no       (unknown)  (unknown)  percutaneous  (units    (u

nknown)



                    date)                         drainage of the unknown)  



                                                  abscess. She was           



                                                  placed on oral           



                                                  antibiotics           

 

           (unknown)  (no       (unknown)  (unknown) predictable periods  (units

    (unknown)



                    date)                         up until that time. unknown)  



                                                  She presents today           



                                                  in follow-up from an          

 

 

           (unknown)  (no       (unknown)  (unknown) promethazine 25 mg  (units 

   (unknown)



                    date)                         tablet 25 mg PO Q6H unknown)  



                                                  PRN 22           



                                                  [History Confirmed           



                                                  22]           

 

           (unknown)  (no       (unknown)  (unknown) pt here for ovarian  (units

    (unknown)



                    date)                         cyst/pelvic pain. unknown)  



                                                  was seen in ER at           



                                                  [facility] on Wednesday          

 



                                                  and                 

 

           (unknown)  (no       (unknown)  (unknown)  read the note  (units    (

unknown)



                    date)                         carefully and unknown)  



                                                  recognize, using           



                                                  context, where these          

 



                                                  substitutions           

 

           (unknown)  (no       (unknown)  (unknown)  sentences  (units    (unkn

own)



                    date)                                   unknown)  

 

           (unknown)  (no       (unknown)  (unknown)  software. Although  (units

    (unknown)



                    date)                         every effort is made unknown) 

 



                                                  to edit content,           



                                                  transcription errors          

 



                                                  m                   

 

           (unknown)  (no       (unknown)  (unknown)  suboptimal,  (units    (un

known)



                    date)                         laparoscopy with unknown)  



                                                  possible right           



                                                  salpingo-oophorectom          

 



                                                  y may be            

 

           (unknown)  (no       (unknown)  (unknown)  switch to a  (units    (un

known)



                    date)                         different regimen unknown)  



                                                  which she could           



                                                  tolerate.           

 

           (unknown)  (no       (unknown)  (unknown)  tabs every 6 hours  (units

    (unknown)



                    date)                         as needed for unknown)  



                                                  breakthrough pain           



                                                  and plan to follow           



                                                  her up in           

 

           (unknown)  (no       (unknown)  (unknown)  tender, cervical  (units  

  (unknown)



                    date)                         motion tenderness unknown)  



                                                  and tender (Mild,           



                                                  diffuse)            

 

           (unknown)  (no       (unknown)  (unknown) that time she is had  (unit

s    (unknown)



                    date)                         persistent right unknown)  



                                                  lower quadrant pain           



                                                  which is noncyclic           



                                                  and                 

 

           (unknown)  (no       (unknown)  (unknown)  the exception of a  (units

    (unknown)



                    date)                         3 cm hemorrhagic unknown)  



                                                  cyst within the           



                                                  right ovary.           

 

           (unknown)  (no       (unknown)  (unknown)  tubo-ovarian  (units    (u

nknown)



                    date)                         abscess in December unknown)  



                                                  of . Patient's           



                                                  intolerance of           



                                                  Vibramycin           

 

           (unknown)  (no       (unknown)  (unknown)  urethra   (units    (unkno

wn)



                    date)                                   unknown)  

 

           (unknown)  (no       (unknown)  (unknown)  warranted to  (units    (u

nknown)



                    date)                         relieve her unknown)  



                                                  symptoms.           









                                         Result panel 7









           (unknown)  (no       (unknown)  (unknown)  (no value)  (units    (unk

nown)



                    date)                                   unknown)  

 

           (unknown)  (no       (unknown)  (unknown)  08:12     (units    (unkno

wn)



                    date)                                   unknown)  

 

           (unknown)  (no       (unknown)  (unknown)  22  (units    (unkno

wn)



                    date)                                   unknown)  

 

           (unknown)  (no       (unknown)  (unknown)  [id number]    (units    (unkno

wn)



                    date)                                   unknown)  

 

           (unknown)  (no       (unknown)  (unknown)  Acne (-)  (units    (u

nknown)



                    date)                                   unknown)  

 

           (unknown)  (no       (unknown)  (unknown)  Age/Sex: 17 / F  (units   

 (unknown)



                    date)                         Date of Service: unknown)  

 

           (unknown)  (no       (unknown)  (unknown)  Allergies  (units    (unkn

own)



                    date)                                   unknown)  

 

           (unknown)  (no       (unknown)  (unknown)  [place]  (units    (

unknown)



                    date)                         [id number]     unknown)  

 

           (unknown)  (no       (unknown)  (unknown)  Anesthesia  (units    (unk

nown)



                    date)                                   unknown)  

 

           (unknown)  (no       (unknown)  (unknown)  Attending Dr:  (units    (

unknown)



                    date)                         Jose Almodovar unknown)  



                                                  MD                  

 

           (unknown)  (no       (unknown)  (unknown)  BMI 26.2  (units    (unkno

wn)



                    date)                                   unknown)  

 

           (unknown)  (no       (unknown)  (unknown)  /70  (units    (unkn

own)



                    date)                                   unknown)  

 

           (unknown)  (no       (unknown)  (unknown)  Blood Pressure  (units    

(unknown)



                    date)                         Location Rt unknown)  



                                                  brachial            

 

           (unknown)  (no       (unknown)  (unknown)  Chlamydia  (units    (unkn

own)



                    date)                         infection () unknown)  

 

           (unknown)  (no       (unknown)  (unknown)  : 2005  (units   

 (unknown)



                    date)                         Acct:[id number] unknown)  

 

           (unknown)  (no       (unknown)  (unknown)  Dept at   (units    (unkno

wn)



                    date)                         [phone]. unknown)  

 

           (unknown)  (no       (unknown)  (unknown)  Documented By:  (units    

(unknown)



                    date)                         Jose Almodovar unknown)  



                                                  MD 22 0810           

 

           (unknown)  (no       (unknown)  (unknown)  Draft     (units    (unkno

wn)



                    date)                                   unknown)  

 

           (unknown)  (no       (unknown)  (unknown)  Endometriosis  (units    (

unknown)



                    date)                         ()   unknown)  

 

           (unknown)  (no       (unknown)  (unknown)  [facility]  (units   

 (unknown)



                    date)                         [facility] unknown)  

 

           (unknown)  (no       (unknown)  (unknown)  Gynecology Visit  (units  

  (unknown)



                    date)                                   unknown)  

 

           (unknown)  (no       (unknown)  (unknown)  Heavy menstrual  (units   

 (unknown)



                    date)                         period () unknown)  

 

           (unknown)  (no       (unknown)  (unknown)  Height 5 ft 3.5  (units   

 (unknown)



                    date)                         in        unknown)  

 

           (unknown)  (no       (unknown)  (unknown)  Intake Note:  (units    (u

nknown)



                    date)                                   unknown)  

 

           (unknown)  (no       (unknown)  (unknown)  Intake performed  (units  

  (unknown)



                    date)                         by: Alvarez Woodson unknown)  

 

           (unknown)  (no       (unknown)  (unknown)  Intake    (units    (unkno

wn)



                    date)                                   unknown)  

 

           (unknown)  (no       (unknown)  (unknown)  Intake- Clincial  (units  

  (unknown)



                    date)                         Staff     unknown)  

 

           (unknown)  (no       (unknown)  (unknown)  Irregular  (units    (unkn

own)



                    date)                         menstrual cycle unknown)  



                                                  ()             

 

           (unknown)  (no       (unknown)  (unknown)  Is last   (units    (unkno

wn)



                    date)                         menstrual period unknown)  



                                                  known: No           

 

           (unknown)  (no       (unknown)  (unknown)  Last Menstural  (units    

(unknown)



                    date)                         Cycle + Details unknown)  

 

           (unknown)  (no       (unknown)  (unknown)  Loc: [facility]  (units    (unkno

wn)



                    date)                                   unknown)  

 

           (unknown)  (no       (unknown)  (unknown)  Lymphangioma  (units    (u

nknown)



                    date)                                   unknown)  

 

           (unknown)  (no       (unknown)  (unknown)  Medical History  (units   

 (unknown)



                    date)                         (Reviewed unknown)  



                                                  22 @ 08:13           



                                                  by Alvarez Woodson RN)                 

 

           (unknown)  (no       (unknown)  (unknown)  Opioids -  (units    (unkn

own)



                    date)                         Morphine  unknown)  



                                                  Analogues Allergy           



                                                  (Intermediate,           



                                                  Verified 22           



                                                  08:15)              

 

           (unknown)  (no       (unknown)  (unknown)  Other Menstrual  (units   

 (unknown)



                    date)                         Period: Other unknown)  

 

           (unknown)  (no       (unknown)  (unknown)  Ovarian cyst  (units    (u

nknown)



                    date)                                   unknown)  

 

           (unknown)  (no       (unknown)  (unknown)  PFSH      (units    (unkno

wn)



                    date)                                   unknown)  

 

           (unknown)  (no       (unknown)  (unknown)  Painful   (units    (unkno

wn)



                    date)                         menstrual periods unknown)  



                                                  ()             

 

           (unknown)  (no       (unknown)  (unknown)  Patient:  (units    (unkno

wn)



                    date)                         Kateryna Jenkins unknown)  



                                                  MR#: [id number]           

 

           (unknown)  (no       (unknown)  (unknown)  Position Sitting  (units  

  (unknown)



                    date)                                   unknown)  

 

           (unknown)  (no       (unknown)  (unknown)  Pt here for FU  (units    

(unknown)



                    date)                         pelvic pain. unknown)  



                                                  States she is           



                                                  still having pain           

 

           (unknown)  (no       (unknown)  (unknown)  Reason For Visit  (units  

  (unknown)



                    date)                                   unknown)  

 

           (unknown)  (no       (unknown)  (unknown)  S/P ACL   (units    (unkno

wn)



                    date)                         reconstruction unknown)  



                                                  (-21)           

 

           (unknown)  (no       (unknown)  (unknown)  Signed By:  (units    (unk

nown)



                    date)                                   unknown)  

 

           (unknown)  (no       (unknown)  (unknown)  Smoking Status:  (units   

 (unknown)



                    date)                         Never smoker unknown)  

 

           (unknown)  (no       (unknown)  (unknown)  Surgical History  (units  

  (unknown)



                    date)                         (Reviewed unknown)  



                                                  22 @ 08:13           



                                                  by Alvarez Woodson RN)                 

 

           (unknown)  (no       (unknown)  (unknown)  TOA       (units    (unkno

wn)



                    date)                         (tubo-ovarian unknown)  



                                                  abscess)            



                                                  ()           

 

           (unknown)  (no       (unknown)  (unknown)  This note may  (units    (

unknown)



                    date)                         have been all or unknown)  



                                                  partially           



                                                  generated using           



                                                  voice recognition           

 

           (unknown)  (no       (unknown)  (unknown)  Tobacco +  (units    (unkn

own)



                    date)                         Substance Use unknown)  

 

           (unknown)  (no       (unknown)  (unknown)  Tobacco Status  (units    

(unknown)



                    date)                                   unknown)  

 

           (unknown)  (no       (unknown)  (unknown)  Visit Reasons:  (units    

(unknown)



                    date)                         F/U Chronic unknown)  



                                                  Pelvic Pain           

 

           (unknown)  (no       (unknown)  (unknown)  Vitals    (units    (unkno

wn)



                    date)                                   unknown)  

 

           (unknown)  (no       (unknown)  (unknown)  Weight 150 lb 1  (units   

 (unknown)



                    date)                         oz        unknown)  

 

           (unknown)  (no       (unknown)  (unknown)  doxycycline  (units    (un

known)



                    date)                         Adverse Reaction unknown)  



                                                  (Intermediate,           



                                                  Verified 22           



                                                  08:15)              

 

           (unknown)  (no       (unknown)  (unknown)  have occurred.  (units    

(unknown)



                    date)                         If there are any unknown)  



                                                  questions, please           



                                                  contact the           



                                                  Medical Records           

 

           (unknown)  (no       (unknown)  (unknown)  may occur.  (units    (unk

nown)



                    date)                         Occasional unknown)  



                                                  wrong-word or           



                                                  'sound-alike'           



                                                  substitutions may           



                                                  have                

 

           (unknown)  (no       (unknown)  (unknown)  occurred due to  (units   

 (unknown)



                    date)                         the inherent unknown)  



                                                  limitations of           



                                                  voice recognition           



                                                  software. Please           

 

           (unknown)  (no       (unknown)  (unknown)  rash      (units    (unkno

wn)



                    date)                                   unknown)  

 

           (unknown)  (no       (unknown)  (unknown)  read the note  (units    (

unknown)



                    date)                         carefully and unknown)  



                                                  recognize, using           



                                                  context, where           



                                                  these               



                                                  substitutions           

 

           (unknown)  (no       (unknown)  (unknown)  software.  (units    (unkn

own)



                    date)                         Although every unknown)  



                                                  effort is made to           



                                                  edit content,           



                                                  transcription           



                                                  errors              

 

           (unknown)  (no       (unknown)  (unknown)  vomitt    (units    (unkno

wn)



                    date)                                   unknown)  









                                         Result panel 8









           (unknown)  (no       (unknown)  (unknown)  (no value)  (units    (unk

nown)



                    date)                                   unknown)  

 

           (unknown)  (no       (unknown)  (unknown)  08:12     (units    (unkno

wn)



                    date)                                   unknown)  

 

           (unknown)  (no       (unknown)  (unknown)  22  (units    (unkno

wn)



                    date)                                   unknown)  

 

           (unknown)  (no       (unknown)  (unknown)  22]  (units    (unkn

own)



                    date)                                   unknown)  

 

           (unknown)  (no       (unknown)  (unknown)  [id number]    (units    (unkno

wn)



                    date)                                   unknown)  

 

           (unknown)  (no       (unknown)  (unknown)  Acne (-2016)  (units    (u

nknown)



                    date)                                   unknown)  

 

           (unknown)  (no       (unknown)  (unknown)  Age/Sex: 17 / F  (units   

 (unknown)



                    date)                         Date of Service: unknown)  

 

           (unknown)  (no       (unknown)  (unknown)  Allergies  (units    (unkn

own)



                    date)                                   unknown)  

 

           (unknown)  (no       (unknown)  (unknown)  [place]  (units    (

unknown)



                    date)                         [id number]     unknown)  

 

           (unknown)  (no       (unknown)  (unknown)  Anesthesia  (units    (unk

nown)



                    date)                                   unknown)  

 

           (unknown)  (no       (unknown)  (unknown)  Attending Dr:  (units    (

unknown)



                    date)                         Jose Almodovar unknown)  



                                                  MD                  

 

           (unknown)  (no       (unknown)  (unknown)  BMI 26.2  (units    (unkno

wn)



                    date)                                   unknown)  

 

           (unknown)  (no       (unknown)  (unknown)  /70  (units    (unkn

own)



                    date)                                   unknown)  

 

           (unknown)  (no       (unknown)  (unknown)  Blood Pressure  (units    

(unknown)



                    date)                         Location Rt unknown)  



                                                  brachial            

 

           (unknown)  (no       (unknown)  (unknown)  Chief Complaint  (units   

 (unknown)



                    date)                                   unknown)  

 

           (unknown)  (no       (unknown)  (unknown)  Chief Complaint:  (units  

  (unknown)



                    date)                         Right lower unknown)  



                                                  quadrant pain,           



                                                  recurrent           



                                                  bacterial           



                                                  vaginosis           

 

           (unknown)  (no       (unknown)  (unknown)  Chlamydia  (units    (unkn

own)



                    date)                         infection () unknown)  

 

           (unknown)  (no       (unknown)  (unknown)  : 2005  (units   

 (unknown)



                    date)                         Acct:[id number] unknown)  

 

           (unknown)  (no       (unknown)  (unknown)  Dept at   (units    (unkno

wn)



                    date)                         [phone]. unknown)  

 

           (unknown)  (no       (unknown)  (unknown)  Details:  (units    (unkno

wn)



                    date)                                   unknown)  

 

           (unknown)  (no       (unknown)  (unknown)  Documented By:  (units    

(unknown)



                    date)                         Jose Almodovar unknown)  



                                                  MD 22 0810           

 

           (unknown)  (no       (unknown)  (unknown)  Draft     (units    (unkno

wn)



                    date)                                   unknown)  

 

           (unknown)  (no       (unknown)  (unknown)  Endometriosis  (units    (

unknown)



                    date)                         ()   unknown)  

 

           (unknown)  (no       (unknown)  (unknown)  [facility]  (units   

 (unknown)



                    date)                         [facility] unknown)  

 

           (unknown)  (no       (unknown)  (unknown)  Kateryna returns  (units    (

unknown)



                    date)                         today after 2 unknown)  



                                                  week therapy with           

 

           (unknown)  (no       (unknown)  (unknown)  Gynecology Visit  (units  

  (unknown)



                    date)                                   unknown)  

 

           (unknown)  (no       (unknown)  (unknown)  HPI       (units    (unkno

wn)



                    date)                                   unknown)  

 

           (unknown)  (no       (unknown)  (unknown)  Heavy menstrual  (units   

 (unknown)



                    date)                         period (-2019) unknown)  

 

           (unknown)  (no       (unknown)  (unknown)  Height 5 ft 3.5  (units   

 (unknown)



                    date)                         in        unknown)  

 

           (unknown)  (no       (unknown)  (unknown)  Intake Note:  (units    (u

nknown)



                    date)                                   unknown)  

 

           (unknown)  (no       (unknown)  (unknown)  Intake performed  (units  

  (unknown)



                    date)                         by: Alvarez Woodson unknown)  

 

           (unknown)  (no       (unknown)  (unknown)  Intake    (units    (unkno

wn)



                    date)                                   unknown)  

 

           (unknown)  (no       (unknown)  (unknown)  Intake- Clincial  (units  

  (unknown)



                    date)                         Staff     unknown)  

 

           (unknown)  (no       (unknown)  (unknown)  Irregular  (units    (unkn

own)



                    date)                         menstrual cycle unknown)  



                                                  ()             

 

           (unknown)  (no       (unknown)  (unknown)  Is last   (units    (unkno

wn)



                    date)                         menstrual period unknown)  



                                                  known: No           

 

           (unknown)  (no       (unknown)  (unknown)  Last Menstural  (units    

(unknown)



                    date)                         Cycle + Details unknown)  

 

           (unknown)  (no       (unknown)  (unknown)  Loc: [facility]  (units    (unkno

wn)



                    date)                                   unknown)  

 

           (unknown)  (no       (unknown)  (unknown)  Lymphangioma  (units    (u

nknown)



                    date)                                   unknown)  

 

           (unknown)  (no       (unknown)  (unknown)  Medical History  (units   

 (unknown)



                    date)                         (Reviewed unknown)  



                                                  22 @ 08:13           



                                                  by Alvarez Woodson           



                                                  RN)                 

 

           (unknown)  (no       (unknown)  (unknown)  Medications  (units    (un

known)



                    date)                                   unknown)  

 

           (unknown)  (no       (unknown)  (unknown)  Opioids -  (units    (unkn

own)



                    date)                         Morphine  unknown)  



                                                  Analogues Allergy           



                                                  (Intermediate,           



                                                  Verified 22           



                                                  08:14)              

 

           (unknown)  (no       (unknown)  (unknown)  Other Menstrual  (units   

 (unknown)



                    date)                         Period: Other unknown)  

 

           (unknown)  (no       (unknown)  (unknown)  Ovarian cyst  (units    (u

nknown)



                    date)                                   unknown)  

 

           (unknown)  (no       (unknown)  (unknown)  PFSH      (units    (unkno

wn)



                    date)                                   unknown)  

 

           (unknown)  (no       (unknown)  (unknown)  Painful   (units    (unkno

wn)



                    date)                         menstrual periods unknown)  



                                                  ()             

 

           (unknown)  (no       (unknown)  (unknown)  Patient:  (units    (unkno

wn)



                    date)                         Kateryna Jenkins unknown)  



                                                  MR#: [id number]           

 

           (unknown)  (no       (unknown)  (unknown)  Position Sitting  (units  

  (unknown)



                    date)                                   unknown)  

 

           (unknown)  (no       (unknown)  (unknown)  Pt here for FU  (units    

(unknown)



                    date)                         pelvic pain. unknown)  



                                                  States she is           



                                                  still having pain           

 

           (unknown)  (no       (unknown)  (unknown)  Reason For Visit  (units  

  (unknown)



                    date)                                   unknown)  

 

           (unknown)  (no       (unknown)  (unknown)  S/P ACL   (units    (unkno

wn)



                    date)                         reconstruction unknown)  



                                                  ()           

 

           (unknown)  (no       (unknown)  (unknown)  Signed By:  (units    (unk

nown)



                    date)                                   unknown)  

 

           (unknown)  (no       (unknown)  (unknown)  Smoking Status:  (units   

 (unknown)



                    date)                         Never smoker unknown)  

 

           (unknown)  (no       (unknown)  (unknown)  Surgical History  (units  

  (unknown)



                    date)                         (Reviewed unknown)  



                                                  22 @ 08:13           



                                                  by Alvarez Woodson RN)                 

 

           (unknown)  (no       (unknown)  (unknown)  TOA       (units    (unkno

wn)



                    date)                         (tubo-ovarian unknown)  



                                                  abscess)            



                                                  ()           

 

           (unknown)  (no       (unknown)  (unknown)  This note may  (units    (

unknown)



                    date)                         have been all or unknown)  



                                                  partially           



                                                  generated using           



                                                  voice recognition           

 

           (unknown)  (no       (unknown)  (unknown)  Tobacco +  (units    (unkn

own)



                    date)                         Substance Use unknown)  

 

           (unknown)  (no       (unknown)  (unknown)  Tobacco Status  (units    

(unknown)



                    date)                                   unknown)  

 

           (unknown)  (no       (unknown)  (unknown)  Visit Reasons:  (units    

(unknown)



                    date)                         F/U Chronic unknown)  



                                                  Pelvic Pain           

 

           (unknown)  (no       (unknown)  (unknown)  Vitals    (units    (unkno

wn)



                    date)                                   unknown)  

 

           (unknown)  (no       (unknown)  (unknown)  Weight 150 lb 1  (units   

 (unknown)



                    date)                         oz        unknown)  

 

           (unknown)  (no       (unknown)  (unknown)  doxycycline  (units    (un

known)



                    date)                         Adverse Reaction unknown)  



                                                  (Intermediate,           



                                                  Verified 22           



                                                  08:14)              

 

           (unknown)  (no       (unknown)  (unknown)  have occurred.  (units    

(unknown)



                    date)                         If there are any unknown)  



                                                  questions, please           



                                                  contact the           



                                                  Medical Records           

 

           (unknown)  (no       (unknown)  (unknown)  may occur.  (units    (unk

nown)



                    date)                         Occasional unknown)  



                                                  wrong-word or           



                                                  'sound-alike'           



                                                  substitutions may           



                                                  have                

 

           (unknown)  (no       (unknown)  (unknown)  occurred due to  (units   

 (unknown)



                    date)                         the inherent unknown)  



                                                  limitations of           



                                                  voice recognition           



                                                  software. Please           

 

           (unknown)  (no       (unknown)  (unknown)  oxycodone 5 mg  (units    

(unknown)



                    date)                         tablet 5 mg PO unknown)  



                                                  Q6H PRN pain #10           



                                                  tabs 22 [Rx           



                                                  Confirmed           

 

           (unknown)  (no       (unknown)  (unknown) promethazine 25  (units    

(unknown)



                    date)                         mg tablet 25 mg unknown)  



                                                  PO Q6H PRN           



                                                  22 [History           



                                                  Confirmed           



                                                  22]           

 

           (unknown)  (no       (unknown)  (unknown)  rash      (units    (unkno

wn)



                    date)                                   unknown)  

 

           (unknown)  (no       (unknown)  (unknown)  read the note  (units    (

unknown)



                    date)                         carefully and unknown)  



                                                  recognize, using           



                                                  context, where           



                                                  these               



                                                  substitutions           

 

           (unknown)  (no       (unknown)  (unknown)  software.  (units    (unkn

own)



                    date)                         Although every unknown)  



                                                  effort is made to           



                                                  edit content,           



                                                  transcription           



                                                  errors              

 

           (unknown)  (no       (unknown)  (unknown)  vomitt    (units    (unkno

wn)



                    date)                                   unknown)  









                                         Result panel 9









           (unknown)  (no       (unknown)  (unknown)  (no value)  (units    (unk

nown)



                    date)                                   unknown)  

 

           (unknown)  (no       (unknown)  (unknown)  08:12     (units    (unkno

wn)



                    date)                                   unknown)  

 

           (unknown)  (no       (unknown)  (unknown)  22  (units    (unkno

wn)



                    date)                                   unknown)  

 

           (unknown)  (no       (unknown)  (unknown)  22]  (units    (unkn

own)



                    date)                                   unknown)  

 

           (unknown)  (no       (unknown)  (unknown)  [id number]    (units    (unkno

wn)



                    date)                                   unknown)  

 

           (unknown)  (no       (unknown)  (unknown)  Acne (-2016)  (units    (u

nknown)



                    date)                                   unknown)  

 

           (unknown)  (no       (unknown)  (unknown)  Age/Sex: 17 / F  (units   

 (unknown)



                    date)                         Date of Service: unknown)  

 

           (unknown)  (no       (unknown)  (unknown)  Allergies  (units    (unkn

own)



                    date)                                   unknown)  

 

           (unknown)  (no       (unknown)  (unknown)  [place]  (units    (

unknown)



                    date)                         [id number]     unknown)  

 

           (unknown)  (no       (unknown)  (unknown)  Anesthesia  (units    (unk

nown)



                    date)                                   unknown)  

 

           (unknown)  (no       (unknown)  (unknown)  Attending Dr:  (units    (

unknown)



                    date)                         Jose Almodovar unknown)  



                                                  MD                  

 

           (unknown)  (no       (unknown)  (unknown)  BMI 26.2  (units    (unkno

wn)



                    date)                                   unknown)  

 

           (unknown)  (no       (unknown)  (unknown)  /70  (units    (unkn

own)



                    date)                                   unknown)  

 

           (unknown)  (no       (unknown)  (unknown)  Blood Pressure  (units    

(unknown)



                    date)                         Location Rt unknown)  



                                                  brachial            

 

           (unknown)  (no       (unknown)  (unknown)  Chief Complaint  (units   

 (unknown)



                    date)                                   unknown)  

 

           (unknown)  (no       (unknown)  (unknown)  Chief Complaint:  (units  

  (unknown)



                    date)                         Right lower unknown)  



                                                  quadrant pain,           



                                                  recurrent           



                                                  bacterial           



                                                  vaginosis           

 

           (unknown)  (no       (unknown)  (unknown)  Chlamydia  (units    (unkn

own)



                    date)                         infection () unknown)  

 

           (unknown)  (no       (unknown)  (unknown)  : 2005  (units   

 (unknown)



                    date)                         Acct:[id number] unknown)  

 

           (unknown)  (no       (unknown)  (unknown)  Dept at   (units    (unkno

wn)



                    date)                         [phone]. unknown)  

 

           (unknown)  (no       (unknown)  (unknown)  Details:  (units    (unkno

wn)



                    date)                                   unknown)  

 

           (unknown)  (no       (unknown)  (unknown)  Documented By:  (units    

(unknown)



                    date)                         Jose Almodovar unknown)  



                                                  MD 22 0810           

 

           (unknown)  (no       (unknown)  (unknown)  [employer]     (units    (unkno

wn)



                    date)                                   unknown)  

 

           (unknown)  (no       (unknown)  (unknown)  Endometriosis  (units    (

unknown)



                    date)                         ()   unknown)  

 

           (unknown)  (no       (unknown)  (unknown)  [facility]  (units   

 (unknown)



                    date)                         [facility] unknown)  

 

           (unknown)  (no       (unknown)  (unknown)  Kateryna returns  (units    (

unknown)



                    date)                         today after 2 unknown)  



                                                  week therapy with           



                                                  levofloxacin and           



                                                  metronidazole.           

 

           (unknown)  (no       (unknown)  (unknown)  Gynecology Visit  (units  

  (unknown)



                    date)                                   unknown)  

 

           (unknown)  (no       (unknown)  (unknown)  HPI       (units    (unkno

wn)



                    date)                                   unknown)  

 

           (unknown)  (no       (unknown)  (unknown)  Heavy menstrual  (units   

 (unknown)



                    date)                         period () unknown)  

 

           (unknown)  (no       (unknown)  (unknown)  Height 5 ft 3.5  (units   

 (unknown)



                    date)                         in        unknown)  

 

           (unknown)  (no       (unknown)  (unknown)  Intake Note:  (units    (u

nknown)



                    date)                                   unknown)  

 

           (unknown)  (no       (unknown)  (unknown)  Intake performed  (units  

  (unknown)



                    date)                         by: Alvarez Woodson unknown)  

 

           (unknown)  (no       (unknown)  (unknown)  Intake    (units    (unkno

wn)



                    date)                                   unknown)  

 

           (unknown)  (no       (unknown)  (unknown)  Intake- Clincial  (units  

  (unknown)



                    date)                         Staff     unknown)  

 

           (unknown)  (no       (unknown)  (unknown)  Irregular  (units    (unkn

own)



                    date)                         menstrual cycle unknown)  



                                                  ()             

 

           (unknown)  (no       (unknown)  (unknown)  Is last   (units    (unkno

wn)



                    date)                         menstrual period unknown)  



                                                  known: No           

 

           (unknown)  (no       (unknown)  (unknown)  Last Menstural  (units    

(unknown)



                    date)                         Cycle + Details unknown)  

 

           (unknown)  (no       (unknown)  (unknown)  Loc: [facility]  (units    (unkno

wn)



                    date)                                   unknown)  

 

           (unknown)  (no       (unknown)  (unknown)  Lymphangioma  (units    (u

nknown)



                    date)                                   unknown)  

 

           (unknown)  (no       (unknown)  (unknown)  Medical History  (units   

 (unknown)



                    date)                         (Reviewed unknown)  



                                                  22 @ 08:13           



                                                  by Alvarez Woodson RN)                 

 

           (unknown)  (no       (unknown)  (unknown)  Medications  (units    (un

known)



                    date)                                   unknown)  

 

           (unknown)  (no       (unknown)  (unknown)  Opioids -  (units    (unkn

own)



                    date)                         Morphine  unknown)  



                                                  Analogues Allergy           



                                                  (Intermediate,           



                                                  Verified 22           



                                                  08:14)              

 

           (unknown)  (no       (unknown)  (unknown)  Other Menstrual  (units   

 (unknown)



                    date)                         Period: Other unknown)  

 

           (unknown)  (no       (unknown)  (unknown)  Ovarian cyst  (units    (u

nknown)



                    date)                                   unknown)  

 

           (unknown)  (no       (unknown)  (unknown)  PFSH      (units    (unkno

wn)



                    date)                                   unknown)  

 

           (unknown)  (no       (unknown)  (unknown)  Painful   (units    (unkno

wn)



                    date)                         menstrual periods unknown)  



                                                  ()             

 

           (unknown)  (no       (unknown)  (unknown)  Patient:  (units    (unkno

wn)



                    date)                         Kateryna Jenkins unknown)  



                                                  MR#: [id number]           

 

           (unknown)  (no       (unknown)  (unknown)  Position Sitting  (units  

  (unknown)



                    date)                                   unknown)  

 

           (unknown)  (no       (unknown)  (unknown)  Pt here for FU  (units    

(unknown)



                    date)                         pelvic pain. unknown)  



                                                  States she is           



                                                  still having pain           

 

           (unknown)  (no       (unknown)  (unknown)  Reason For Visit  (units  

  (unknown)



                    date)                                   unknown)  

 

           (unknown)  (no       (unknown)  (unknown)  S/P ACL   (units    (unkno

wn)



                    date)                         reconstruction unknown)  



                                                  ()           

 

           (unknown)  (no       (unknown)  (unknown)  She is not noted  (units  

  (unknown)



                    date)                         any significant unknown)  



                                                  difference in her           



                                                  right lower           



                                                  quadrant/right           

 

           (unknown)  (no       (unknown)  (unknown)  Signed By:  (units    (unk

nown)



                    date)                                   unknown)  

 

           (unknown)  (no       (unknown)  (unknown)  Smoking Status:  (units   

 (unknown)



                    date)                         Never smoker unknown)  

 

           (unknown)  (no       (unknown)  (unknown)  Surgical History  (units  

  (unknown)



                    date)                         (Reviewed unknown)  



                                                  22 @ 08:13           



                                                  by Alvarez Woodson RN)                 

 

           (unknown)  (no       (unknown)  (unknown)  TOA       (units    (unkno

wn)



                    date)                         (tubo-ovarian unknown)  



                                                  abscess)            



                                                  ()           

 

           (unknown)  (no       (unknown)  (unknown)  This note may  (units    (

unknown)



                    date)                         have been all or unknown)  



                                                  partially           



                                                  generated using           



                                                  voice recognition           

 

           (unknown)  (no       (unknown)  (unknown)  Tobacco +  (units    (unkn

own)



                    date)                         Substance Use unknown)  

 

           (unknown)  (no       (unknown)  (unknown)  Tobacco Status  (units    

(unknown)



                    date)                                   unknown)  

 

           (unknown)  (no       (unknown)  (unknown)  Visit Reasons:  (units    

(unknown)



                    date)                         F/U Chronic unknown)  



                                                  Pelvic Pain           

 

           (unknown)  (no       (unknown)  (unknown)  Vitals    (units    (unkno

wn)



                    date)                                   unknown)  

 

           (unknown)  (no       (unknown)  (unknown)  Weight 150 lb 1  (units   

 (unknown)



                    date)                         oz        unknown)  

 

           (unknown)  (no       (unknown)  (unknown)  doxycycline  (units    (un

known)



                    date)                         Adverse Reaction unknown)  



                                                  (Intermediate,           



                                                  Verified 22           



                                                  08:14)              

 

           (unknown)  (no       (unknown)  (unknown)  evaluation/treat  (units  

  (unknown)



                    date)                         ment of her unknown)  



                                                  RLQ/pelvic pain.           

 

           (unknown)  (no       (unknown)  (unknown)  have occurred.  (units    

(unknown)



                    date)                         If there are any unknown)  



                                                  questions, please           



                                                  contact the           



                                                  Medical Records           

 

           (unknown)  (no       (unknown)  (unknown)  may occur.  (units    (unk

nown)



                    date)                         Occasional unknown)  



                                                  wrong-word or           



                                                  'sound-alike'           



                                                  substitutions may           



                                                  have                

 

           (unknown)  (no       (unknown)  (unknown)  occurred due to  (units   

 (unknown)



                    date)                         the inherent unknown)  



                                                  limitations of           



                                                  voice recognition           



                                                  software. Please           

 

           (unknown)  (no       (unknown)  (unknown)  oxycodone 5 mg  (units    

(unknown)



                    date)                         tablet 5 mg PO unknown)  



                                                  Q6H PRN pain #10           



                                                  tabs 22 [Rx           



                                                  Confirmed           

 

           (unknown)  (no       (unknown)  (unknown) promethazine 25  (units    

(unknown)



                    date)                         mg tablet 25 mg unknown)  



                                                  PO Q6H PRN           



                                                  22 [History           



                                                  Confirmed           



                                                  22]           

 

           (unknown)  (no       (unknown)  (unknown)  rash      (units    (unkno

wn)



                    date)                                   unknown)  

 

           (unknown)  (no       (unknown)  (unknown)  read the note  (units    (

unknown)



                    date)                         carefully and unknown)  



                                                  recognize, using           



                                                  context, where           



                                                  these               



                                                  substitutions           

 

           (unknown)  (no       (unknown)  (unknown)  sided pelvic  (units    (u

nknown)



                    date)                         pain. In addition unknown)  



                                                  her bacterial           



                                                  vaginosis which           



                                                  initially           



                                                  improved            

 

           (unknown)  (no       (unknown)  (unknown)  software.  (units    (unkn

own)



                    date)                         Although every unknown)  



                                                  effort is made to           



                                                  edit content,           



                                                  transcription           



                                                  errors              

 

           (unknown)  (no       (unknown)  (unknown)  vomitt    (units    (unkno

wn)



                    date)                                   unknown)  

 

           (unknown)  (no       (unknown)  (unknown)  with therapy has  (units  

  (unknown)



                    date)                         returned. Patient unknown)  



                                                  wishes to proceed           



                                                  with definitive           









                                         Result panel 10









           (unknown)  (no date)  (unknown)  (unknown)  Negative  (units    (unkn

own)



                                                            unknown)  

 

           (unknown)  (no date)  (unknown)  (unknown)  Negative  (units    6568-

0



                                                            unknown)  

 

           (unknown)  (no date)  (unknown)  (unknown)  Positive  (units    (unkn

own)



                                                            unknown)  

 

           (unknown)  (no date)  (unknown)  (unknown)  Positive  (units    40973

-5



                                                            unknown)  

 

           (unknown)  (no date)  (unknown)  (unknown)  Positive  (units    6410-

5



                                                            unknown)  









                                         Result panel 11









           (unknown)  (no       (unknown)  (unknown)  (no value)  (units    (unk

nown)



                    date)                                   unknown)  

 

           (unknown)  (no       (unknown)  (unknown)  08:12     (units    (unkno

wn)



                    date)                                   unknown)  

 

           (unknown)  (no       (unknown)  (unknown)  22  (units    (unkno

wn)



                    date)                                   unknown)  

 

           (unknown)  (no       (unknown)  (unknown)  22]  (units    (unkn

own)



                    date)                                   unknown)  

 

           (unknown)  (no       (unknown)  (unknown)  [id number]    (units    (unkno

wn)



                    date)                                   unknown)  

 

           (unknown)  (no       (unknown)  (unknown)  Acne (-2016)  (units    (u

nknown)



                    date)                                   unknown)  

 

           (unknown)  (no       (unknown)  (unknown)  Affirm Vaginosis  (units  

  (unknown)



                    date)                         Pan Bacterial unknown)  



                                                  Today B96.89 -           



                                                  Other specified           



                                                  bacterial agents           

 

           (unknown)  (no       (unknown)  (unknown)  Age/Sex: 17 / F  (units   

 (unknown)



                    date)                         Date of Service: unknown)  

 

           (unknown)  (no       (unknown)  (unknown)  Allergies  (units    (unkn

own)



                    date)                                   unknown)  

 

           (unknown)  (no       (unknown)  (unknown)  [place]  (units    (

unknown)



                    date)                         [address]     unknown)  

 

           (unknown)  (no       (unknown)  (unknown)  Anesthesia  (units    (unk

nown)



                    date)                                   unknown)  

 

           (unknown)  (no       (unknown)  (unknown)  Assessment +  (units    (u

nknown)



                    date)                         Plan      unknown)  

 

           (unknown)  (no       (unknown)  (unknown)  Attending Dr:  (units    (

unknown)



                    date)                         Jose Almodovar unknown)  



                                                  MD                  

 

           (unknown)  (no       (unknown)  (unknown)  BMI 26.2  (units    (unkno

wn)



                    date)                                   unknown)  

 

           (unknown)  (no       (unknown)  (unknown)  /70  (units    (unkn

own)



                    date)                                   unknown)  

 

           (unknown)  (no       (unknown)  (unknown)  Blood Pressure  (units    

(unknown)



                    date)                         Location Rt unknown)  



                                                  brachial            

 

           (unknown)  (no       (unknown)  (unknown)  Chief Complaint  (units   

 (unknown)



                    date)                                   unknown)  

 

           (unknown)  (no       (unknown)  (unknown)  Chief Complaint:  (units  

  (unknown)



                    date)                         Right lower unknown)  



                                                  quadrant pain,           



                                                  recurrent           



                                                  bacterial           



                                                  vaginosis           

 

           (unknown)  (no       (unknown)  (unknown)  Chlamydia  (units    (unkn

own)



                    date)                         infection () unknown)  

 

           (unknown)  (no       (unknown)  (unknown)  : 2005  (units   

 (unknown)



                    date)                         Acct:[id number] unknown)  

 

           (unknown)  (no       (unknown)  (unknown)  Dept at   (units    (unkno

wn)



                    date)                         [phone] unknown)  

 

           (unknown)  (no       (unknown)  (unknown)  Details:  (units    (unkno

wn)



                    date)                                   unknown)  

 

           (unknown)  (no       (unknown)  (unknown)  Documented By:  (units    

(unknown)



                    date)                         Jose Almodovar unknown)  



                                                  MD 22 0810           

 

           (unknown)  (no       (unknown)  (unknown)  [employer]     (units    (unkno

wn)



                    date)                                   unknown)  

 

           (unknown)  (no       (unknown)  (unknown)  Endometriosis  (units    (

unknown)



                    date)                         ()   unknown)  

 

           (unknown)  (no       (unknown)  (unknown)  [facility]  (units   

 (unknown)



                    date)                         [facility] unknown)  

 

           (unknown)  (no       (unknown)  (unknown)  Kateryna returns  (units    (

unknown)



                    date)                         today after 2 unknown)  



                                                  week therapy with           



                                                  levofloxacin and           



                                                  metronidazole.           

 

           (unknown)  (no       (unknown)  (unknown)  Gynecology Visit  (units  

  (unknown)



                    date)                                   unknown)  

 

           (unknown)  (no       (unknown)  (unknown)  HPI       (units    (unkno

wn)



                    date)                                   unknown)  

 

           (unknown)  (no       (unknown)  (unknown)  Heavy menstrual  (units   

 (unknown)



                    date)                         period (-) unknown)  

 

           (unknown)  (no       (unknown)  (unknown)  Height 5 ft 3.5  (units   

 (unknown)



                    date)                         in        unknown)  

 

           (unknown)  (no       (unknown)  (unknown)  Intake Note:  (units    (u

nknown)



                    date)                                   unknown)  

 

           (unknown)  (no       (unknown)  (unknown)  Intake performed  (units  

  (unknown)



                    date)                         by: Alvarez Woodson unknown)  

 

           (unknown)  (no       (unknown)  (unknown)  Intake    (units    (unkno

wn)



                    date)                                   unknown)  

 

           (unknown)  (no       (unknown)  (unknown)  Intake- Clincial  (units  

  (unknown)



                    date)                         Staff     unknown)  

 

           (unknown)  (no       (unknown)  (unknown)  Irregular  (units    (unkn

own)



                    date)                         menstrual cycle unknown)  



                                                  ()             

 

           (unknown)  (no       (unknown)  (unknown)  Is last   (units    (unkno

wn)



                    date)                         menstrual period unknown)  



                                                  known: No           

 

           (unknown)  (no       (unknown)  (unknown)  Last Menstural  (units    

(unknown)



                    date)                         Cycle + Details unknown)  

 

           (unknown)  (no       (unknown)  (unknown)  Loc: [facility]  (units    (unkno

wn)



                    date)                                   unknown)  

 

           (unknown)  (no       (unknown)  (unknown)  Lymphangioma  (units    (u

nknown)



                    date)                                   unknown)  

 

           (unknown)  (no       (unknown)  (unknown)  Medical History  (units   

 (unknown)



                    date)                         (Reviewed unknown)  



                                                  22 @ 08:13           



                                                  by Alvarez Woodson,           



                                                  RN)                 

 

           (unknown)  (no       (unknown)  (unknown)  Medications  (units    (un

known)



                    date)                                   unknown)  

 

           (unknown)  (no       (unknown)  (unknown)  Opioids -  (units    (unkn

own)



                    date)                         Morphine  unknown)  



                                                  Analogues Allergy           



                                                  (Intermediate,           



                                                  Verified 22           



                                                  08:14)              

 

           (unknown)  (no       (unknown)  (unknown)  Orders    (units    (unkno

wn)



                    date)                                   unknown)  

 

           (unknown)  (no       (unknown)  (unknown)  Orders:   (units    (unkno

wn)



                    date)                                   unknown)  

 

           (unknown)  (no       (unknown)  (unknown)  Other Menstrual  (units   

 (unknown)



                    date)                         Period: Other unknown)  

 

           (unknown)  (no       (unknown)  (unknown)  Ovarian cyst  (units    (u

nknown)



                    date)                                   unknown)  

 

           (unknown)  (no       (unknown)  (unknown)  PFSH      (units    (unkno

wn)



                    date)                                   unknown)  

 

           (unknown)  (no       (unknown)  (unknown)  Painful   (units    (unkno

wn)



                    date)                         menstrual periods unknown)  



                                                  ()             

 

           (unknown)  (no       (unknown)  (unknown)  Patient:  (units    (unkno

wn)



                    date)                         Kateryna Jenkins unknown)  



                                                  MR#: [id number]           

 

           (unknown)  (no       (unknown)  (unknown)  Pelvic and  (units    (unk

nown)



                    date)                         perineal pain unknown)  

 

           (unknown)  (no       (unknown)  (unknown)  Position Sitting  (units  

  (unknown)



                    date)                                   unknown)  

 

           (unknown)  (no       (unknown)  (unknown)  Pt here for FU  (units    

(unknown)



                    date)                         pelvic pain. unknown)  



                                                  States she is           



                                                  still having pain           

 

           (unknown)  (no       (unknown)  (unknown)  Reason For Visit  (units  

  (unknown)



                    date)                                   unknown)  

 

           (unknown)  (no       (unknown)  (unknown)  S/P ACL   (units    (unkno

wn)



                    date)                         reconstruction unknown)  



                                                  ()           

 

           (unknown)  (no       (unknown)  (unknown)  She is not noted  (units  

  (unknown)



                    date)                         any significant unknown)  



                                                  difference in her           



                                                  right lower           



                                                  quadrant/right           

 

           (unknown)  (no       (unknown)  (unknown)  Signed By:  (units    (unk

nown)



                    date)                                   unknown)  

 

           (unknown)  (no       (unknown)  (unknown)  Smoking Status:  (units   

 (unknown)



                    date)                         Never smoker unknown)  

 

           (unknown)  (no       (unknown)  (unknown)  Surgical History  (units  

  (unknown)



                    date)                         (Reviewed unknown)  



                                                  22 @ 08:13           



                                                  by Alvarez Woodson RN)                 

 

           (unknown)  (no       (unknown)  (unknown)  TOA       (units    (unkno

wn)



                    date)                         (tubo-ovarian unknown)  



                                                  abscess)            



                                                  ()           

 

           (unknown)  (no       (unknown)  (unknown)  This note may  (units    (

unknown)



                    date)                         have been all or unknown)  



                                                  partially           



                                                  generated using           



                                                  voice recognition           

 

           (unknown)  (no       (unknown)  (unknown)  Tobacco +  (units    (unkn

own)



                    date)                         Substance Use unknown)  

 

           (unknown)  (no       (unknown)  (unknown)  Tobacco Status  (units    

(unknown)



                    date)                                   unknown)  

 

           (unknown)  (no       (unknown)  (unknown)  Visit Reasons:  (units    

(unknown)



                    date)                         F/U Chronic unknown)  



                                                  Pelvic Pain           

 

           (unknown)  (no       (unknown)  (unknown)  Vitals    (units    (unkno

wn)



                    date)                                   unknown)  

 

           (unknown)  (no       (unknown)  (unknown)  Weight 150 lb 1  (units   

 (unknown)



                    date)                         oz        unknown)  

 

           (unknown)  (no       (unknown)  (unknown)  as the cause of  (units   

 (unknown)



                    date)                         diseases  unknown)  



                                                  classified           



                                                  elsewhere, N76.0           



                                                  - Acute             



                                                  vaginitis, R10.2           

 

           (unknown)  (no       (unknown)  (unknown)  doxycycline  (units    (un

known)



                    date)                         Adverse Reaction unknown)  



                                                  (Intermediate,           



                                                  Verified 22           



                                                  08:14)              

 

           (unknown)  (no       (unknown)  (unknown)  evaluation/treat  (units  

  (unknown)



                    date)                         ment of her unknown)  



                                                  RLQ/pelvic pain.           

 

           (unknown)  (no       (unknown)  (unknown)  have occurred.  (units    

(unknown)



                    date)                         If there are any unknown)  



                                                  questions, please           



                                                  contact the           



                                                  Medical Records           

 

           (unknown)  (no       (unknown)  (unknown)  may occur.  (units    (unk

nown)



                    date)                         Occasional unknown)  



                                                  wrong-word or           



                                                  'sound-alike'           



                                                  substitutions may           



                                                  have                

 

           (unknown)  (no       (unknown)  (unknown)  occurred due to  (units   

 (unknown)



                    date)                         the inherent unknown)  



                                                  limitations of           



                                                  voice recognition           



                                                  software. Please           

 

           (unknown)  (no       (unknown)  (unknown)  oxycodone 5 mg  (units    

(unknown)



                    date)                         tablet 5 mg PO unknown)  



                                                  Q6H PRN pain #10           



                                                  tabs 22 [Rx           



                                                  Confirmed           

 

           (unknown)  (no       (unknown)  (unknown) promethazine 25  (units    

(unknown)



                    date)                         mg tablet 25 mg unknown)  



                                                  PO Q6H PRN           



                                                  22 [History           



                                                  Confirmed           



                                                  22]           

 

           (unknown)  (no       (unknown)  (unknown)  rash      (units    (unkno

wn)



                    date)                                   unknown)  

 

           (unknown)  (no       (unknown)  (unknown)  read the note  (units    (

unknown)



                    date)                         carefully and unknown)  



                                                  recognize, using           



                                                  context, where           



                                                  these               



                                                  substitutions           

 

           (unknown)  (no       (unknown)  (unknown)  sided pelvic  (units    (u

nknown)



                    date)                         pain. In addition unknown)  



                                                  her bacterial           



                                                  vaginosis which           



                                                  initially           



                                                  improved            

 

           (unknown)  (no       (unknown)  (unknown)  software.  (units    (unkn

own)



                    date)                         Although every unknown)  



                                                  effort is made to           



                                                  edit content,           



                                                  transcription           



                                                  errors              

 

           (unknown)  (no       (unknown)  (unknown)  vomitt    (units    (unkno

wn)



                    date)                                   unknown)  

 

           (unknown)  (no       (unknown)  (unknown)  with therapy has  (units  

  (unknown)



                    date)                         returned. Patient unknown)  



                                                  wishes to proceed           



                                                  with definitive           









                                         Result panel 12









           (unknown)  (no       (unknown)  (unknown)  (no value)  (units    (unk

nown)



                    date)                                   unknown)  

 

           (unknown)  (no       (unknown)  (unknown)  08:12     (units    (unkno

wn)



                    date)                                   unknown)  

 

           (unknown)  (no       (unknown)  (unknown)  22  (units    (unkno

wn)



                    date)                                   unknown)  

 

           (unknown)  (no       (unknown)  (unknown)  22]  (units    (unkn

own)



                    date)                                   unknown)  

 

           (unknown)  (no       (unknown)  (unknown)  [id number]    (units    (unkno

wn)



                    date)                                   unknown)  

 

           (unknown)  (no       (unknown)  (unknown)  Acne (-2016)  (units    (u

nknown)



                    date)                                   unknown)  

 

           (unknown)  (no       (unknown)  (unknown)  Affirm Vaginosis  (units  

  (unknown)



                    date)                         Pan Bacterial unknown)  



                                                  Today B96.89 -           



                                                  Other specified           



                                                  bacterial agents           

 

           (unknown)  (no       (unknown)  (unknown)  Age/Sex: 17 / F  (units   

 (unknown)



                    date)                         Date of Service: unknown)  

 

           (unknown)  (no       (unknown)  (unknown)  Allergies  (units    (unkn

own)



                    date)                                   unknown)  

 

           (unknown)  (no       (unknown)  (unknown)  [place]  (units    (

unknown)



                    date)                         [id number]     unknown)  

 

           (unknown)  (no       (unknown)  (unknown)  Anesthesia  (units    (unk

nown)



                    date)                                   unknown)  

 

           (unknown)  (no       (unknown)  (unknown)  Assessment +  (units    (u

nknown)



                    date)                         Plan      unknown)  

 

           (unknown)  (no       (unknown)  (unknown)  Attending Dr:  (units    (

unknown)



                    date)                         Jose Almodovar unknown)  



                                                  MD                  

 

           (unknown)  (no       (unknown)  (unknown)  BMI 26.2  (units    (unkno

wn)



                    date)                                   unknown)  

 

           (unknown)  (no       (unknown)  (unknown)  /70  (units    (unkn

own)



                    date)                                   unknown)  

 

           (unknown)  (no       (unknown)  (unknown)  Blood Pressure  (units    

(unknown)



                    date)                         Location Rt unknown)  



                                                  brachial            

 

           (unknown)  (no       (unknown)  (unknown)  Chief Complaint  (units   

 (unknown)



                    date)                                   unknown)  

 

           (unknown)  (no       (unknown)  (unknown)  Chief Complaint:  (units  

  (unknown)



                    date)                         Right lower unknown)  



                                                  quadrant pain,           



                                                  recurrent           



                                                  bacterial           



                                                  vaginosis           

 

           (unknown)  (no       (unknown)  (unknown)  Chlamydia  (units    (unkn

own)



                    date)                         infection () unknown)  

 

           (unknown)  (no       (unknown)  (unknown)  : 2005  (units   

 (unknown)



                    date)                         Acct:[id number] unknown)  

 

           (unknown)  (no       (unknown)  (unknown)  Dept at   (units    (unkno

wn)



                    date)                         [phone] unknown)  

 

           (unknown)  (no       (unknown)  (unknown)  Details:  (units    (unkno

wn)



                    date)                                   unknown)  

 

           (unknown)  (no       (unknown)  (unknown)  Documented By:  (units    

(unknown)



                    date)                         Jose Almodovar unknown)  



                                                  MD 22 0810           

 

           (unknown)  (no       (unknown)  (unknown)  [employer]     (units    (unkno

wn)



                    date)                                   unknown)  

 

           (unknown)  (no       (unknown)  (unknown)  Endometriosis  (units    (

unknown)



                    date)                         ()   unknown)  

 

           (unknown)  (no       (unknown)  (unknown)  [facility]  (units   

 (unknown)



                    date)                         [facility] unknown)  

 

           (unknown)  (no       (unknown)  (unknown)  Kateryna returns  (units    (

unknown)



                    date)                         today after 2 unknown)  



                                                  week therapy with           



                                                  levofloxacin and           



                                                  metronidazole.           

 

           (unknown)  (no       (unknown)  (unknown)  Gynecology Visit  (units  

  (unknown)



                    date)                                   unknown)  

 

           (unknown)  (no       (unknown)  (unknown)  HPI       (units    (unkno

wn)



                    date)                                   unknown)  

 

           (unknown)  (no       (unknown)  (unknown)  Heavy menstrual  (units   

 (unknown)



                    date)                         period () unknown)  

 

           (unknown)  (no       (unknown)  (unknown)  Height 5 ft 3.5  (units   

 (unknown)



                    date)                         in        unknown)  

 

           (unknown)  (no       (unknown)  (unknown)  Intake Note:  (units    (u

nknown)



                    date)                                   unknown)  

 

           (unknown)  (no       (unknown)  (unknown)  Intake performed  (units  

  (unknown)



                    date)                         by: Alvarez Woodson unknown)  

 

           (unknown)  (no       (unknown)  (unknown)  Intake    (units    (unkno

wn)



                    date)                                   unknown)  

 

           (unknown)  (no       (unknown)  (unknown)  Intake- Clincial  (units  

  (unknown)



                    date)                         Staff     unknown)  

 

           (unknown)  (no       (unknown)  (unknown)  Irregular  (units    (unkn

own)



                    date)                         menstrual cycle unknown)  



                                                  ()             

 

           (unknown)  (no       (unknown)  (unknown)  Is last   (units    (unkno

wn)



                    date)                         menstrual period unknown)  



                                                  known: No           

 

           (unknown)  (no       (unknown)  (unknown)  Last Menstural  (units    

(unknown)



                    date)                         Cycle + Details unknown)  

 

           (unknown)  (no       (unknown)  (unknown)  Loc: [facility]  (units    (unkno

wn)



                    date)                                   unknown)  

 

           (unknown)  (no       (unknown)  (unknown)  Lymphangioma  (units    (u

nknown)



                    date)                                   unknown)  

 

           (unknown)  (no       (unknown)  (unknown)  Medical History  (units   

 (unknown)



                    date)                         (Reviewed unknown)  



                                                  22 @ 08:13           



                                                  by Alvarez Woodson RN)                 

 

           (unknown)  (no       (unknown)  (unknown)  Medications  (units    (un

known)



                    date)                                   unknown)  

 

           (unknown)  (no       (unknown)  (unknown)  Medications:  (units    (u

nknown)



                    date)                                   unknown)  

 

           (unknown)  (no       (unknown)  (unknown)  New       (units    (unkno

wn)



                    date)                                   unknown)  

 

           (unknown)  (no       (unknown)  (unknown)  Opioids -  (units    (unkn

own)



                    date)                         Morphine  unknown)  



                                                  Analogues Allergy           



                                                  (Intermediate,           



                                                  Verified 22           



                                                  08:14)              

 

           (unknown)  (no       (unknown)  (unknown)  Orders    (units    (unkno

wn)



                    date)                                   unknown)  

 

           (unknown)  (no       (unknown)  (unknown)  Orders:   (units    (unkno

wn)



                    date)                                   unknown)  

 

           (unknown)  (no       (unknown)  (unknown)  Other Menstrual  (units   

 (unknown)



                    date)                         Period: Other unknown)  

 

           (unknown)  (no       (unknown)  (unknown)  Ovarian cyst  (units    (u

nknown)



                    date)                                   unknown)  

 

           (unknown)  (no       (unknown)  (unknown)  PFSH      (units    (unkno

wn)



                    date)                                   unknown)  

 

           (unknown)  (no       (unknown)  (unknown)  Painful   (units    (unkno

wn)



                    date)                         menstrual periods unknown)  



                                                  ()             

 

           (unknown)  (no       (unknown)  (unknown)  Patient:  (units    (unkno

wn)



                    date)                         Kateryna Jenkins unknown)  



                                                  MR#: [id number]           

 

           (unknown)  (no       (unknown)  (unknown)  Pelvic and  (units    (unk

nown)



                    date)                         perineal pain unknown)  

 

           (unknown)  (no       (unknown)  (unknown)  Position Sitting  (units  

  (unknown)



                    date)                                   unknown)  

 

           (unknown)  (no       (unknown)  (unknown)  Pt here for FU  (units    

(unknown)



                    date)                         pelvic pain. unknown)  



                                                  States she is           



                                                  still having pain           

 

           (unknown)  (no       (unknown)  (unknown)  Reason For Visit  (units  

  (unknown)



                    date)                                   unknown)  

 

           (unknown)  (no       (unknown)  (unknown)  S/P ACL   (units    (unkno

wn)



                    date)                         reconstruction unknown)  



                                                  ()           

 

           (unknown)  (no       (unknown)  (unknown)  She is not noted  (units  

  (unknown)



                    date)                         any significant unknown)  



                                                  difference in her           



                                                  right lower           



                                                  quadrant/right           

 

           (unknown)  (no       (unknown)  (unknown)  Signed By:  (units    (unk

nown)



                    date)                                   unknown)  

 

           (unknown)  (no       (unknown)  (unknown)  Smoking Status:  (units   

 (unknown)



                    date)                         Never smoker unknown)  

 

           (unknown)  (no       (unknown)  (unknown)  Surgical History  (units  

  (unknown)



                    date)                         (Reviewed unknown)  



                                                  22 @ 08:13           



                                                  by Alvarez Woodson RN)                 

 

           (unknown)  (no       (unknown)  (unknown)  TOA       (units    (unkno

wn)



                    date)                         (tubo-ovarian unknown)  



                                                  abscess)            



                                                  ()           

 

           (unknown)  (no       (unknown)  (unknown)  This note may  (units    (

unknown)



                    date)                         have been all or unknown)  



                                                  partially           



                                                  generated using           



                                                  voice recognition           

 

           (unknown)  (no       (unknown)  (unknown)  Tobacco +  (units    (unkn

own)



                    date)                         Substance Use unknown)  

 

           (unknown)  (no       (unknown)  (unknown)  Tobacco Status  (units    

(unknown)



                    date)                                   unknown)  

 

           (unknown)  (no       (unknown)  (unknown)  US pelvic  (units    (unkn

own)



                    date)                         complete 1 Week unknown)  



                                                  G89.29 - Other           



                                                  chronic pain,           



                                                  N70.11 - Chronic           

 

           (unknown)  (no       (unknown)  (unknown)  Visit Reasons:  (units    

(unknown)



                    date)                         F/U Chronic unknown)  



                                                  Pelvic Pain           

 

           (unknown)  (no       (unknown)  (unknown)  Vitals    (units    (unkno

wn)



                    date)                                   unknown)  

 

           (unknown)  (no       (unknown)  (unknown)  Weight 150 lb 1  (units   

 (unknown)



                    date)                         oz        unknown)  

 

           (unknown)  (no       (unknown)  (unknown)  as the cause of  (units   

 (unknown)



                    date)                         diseases  unknown)  



                                                  classified           



                                                  elsewhere, N76.0           



                                                  - Acute             



                                                  vaginitis, R10.2           

 

           (unknown)  (no       (unknown)  (unknown)  clindamycin HCl  (units   

 (unknown)



                    date)                         150 mg PO DAILY unknown)  



                                                  30 caps 0RF           

 

           (unknown)  (no       (unknown)  (unknown)  clindamycin HCl  (units   

 (unknown)



                    date)                         150 mg capsule unknown)  



                                                  150 mg PO DAILY           



                                                  #30 caps 22           



                                                  [Rx Confirmed           

 

           (unknown)  (no       (unknown)  (unknown)  doxycycline  (units    (un

known)



                    date)                         Adverse Reaction unknown)  



                                                  (Intermediate,           



                                                  Verified 22           



                                                  08:14)              

 

           (unknown)  (no       (unknown)  (unknown)  evaluation/treat  (units  

  (unknown)



                    date)                         ment of her unknown)  



                                                  RLQ/pelvic pain.           

 

           (unknown)  (no       (unknown)  (unknown)  have occurred.  (units    

(unknown)



                    date)                         If there are any unknown)  



                                                  questions, please           



                                                  contact the           



                                                  Medical Records           

 

           (unknown)  (no       (unknown)  (unknown)  lower quadrant  (units    

(unknown)



                    date)                         pain      unknown)  

 

           (unknown)  (no       (unknown)  (unknown)  may occur.  (units    (unk

nown)



                    date)                         Occasional unknown)  



                                                  wrong-word or           



                                                  'sound-alike'           



                                                  substitutions may           



                                                  have                

 

           (unknown)  (no       (unknown)  (unknown)  occurred due to  (units   

 (unknown)



                    date)                         the inherent unknown)  



                                                  limitations of           



                                                  voice recognition           



                                                  software. Please           

 

           (unknown)  (no       (unknown)  (unknown)  oxycodone 5 mg  (units    

(unknown)



                    date)                         tablet 5 mg PO unknown)  



                                                  Q6H PRN pain #10           



                                                  tabs 22 [Rx           



                                                  Confirmed           

 

           (unknown)  (no       (unknown)  (unknown) promethazine 25  (units    

(unknown)



                    date)                         mg tablet 25 mg unknown)  



                                                  PO Q6H PRN           



                                                  22 [History           



                                                  Confirmed           



                                                  22]           

 

           (unknown)  (no       (unknown)  (unknown)  rash      (units    (unkno

wn)



                    date)                                   unknown)  

 

           (unknown)  (no       (unknown)  (unknown)  read the note  (units    (

unknown)



                    date)                         carefully and unknown)  



                                                  recognize, using           



                                                  context, where           



                                                  these               



                                                  substitutions           

 

           (unknown)  (no       (unknown)  (unknown)  salpingitis,  (units    (u

nknown)



                    date)                         N70.93 -  unknown)  



                                                  Salpingitis and           



                                                  oophoritis,           



                                                  unspecified,           



                                                  R10.31 - Right           

 

           (unknown)  (no       (unknown)  (unknown)  sided pelvic  (units    (u

nknown)



                    date)                         pain. In addition unknown)  



                                                  her bacterial           



                                                  vaginosis which           



                                                  initially           



                                                  improved            

 

           (unknown)  (no       (unknown)  (unknown)  software.  (units    (unkn

own)



                    date)                         Although every unknown)  



                                                  effort is made to           



                                                  edit content,           



                                                  transcription           



                                                  errors              

 

           (unknown)  (no       (unknown)  (unknown)  vomitt    (units    (unkno

wn)



                    date)                                   unknown)  

 

           (unknown)  (no       (unknown)  (unknown)  with therapy has  (units  

  (unknown)



                    date)                         returned. Patient unknown)  



                                                  wishes to proceed           



                                                  with definitive           









                                         Result panel 13









           (unknown)  (no       (unknown)  (unknown)  (no value)  (units    (unk

nown)



                    date)                                   unknown)  

 

           (unknown)  (no       (unknown)  (unknown)  08:12     (units    (unkno

wn)



                    date)                                   unknown)  

 

           (unknown)  (no       (unknown)  (unknown)  22  (units    (unkno

wn)



                    date)                                   unknown)  

 

           (unknown)  (no       (unknown)  (unknown)  22]  (units    (unkn

own)



                    date)                                   unknown)  

 

           (unknown)  (no       (unknown)  (unknown)  [id number]    (units    (unkno

wn)



                    date)                                   unknown)  

 

           (unknown)  (no       (unknown)  (unknown)  Acne (-2016)  (units    (u

nknown)



                    date)                                   unknown)  

 

           (unknown)  (no       (unknown)  (unknown)  Affirm Vaginosis  (units  

  (unknown)



                    date)                         Pan Bacterial unknown)  



                                                  Today B96.89 -           



                                                  Other specified           



                                                  bacterial agents           

 

           (unknown)  (no       (unknown)  (unknown)  Age/Sex: 17 / F  (units   

 (unknown)



                    date)                         Date of Service: unknown)  

 

           (unknown)  (no       (unknown)  (unknown)  Allergies  (units    (unkn

own)



                    date)                                   unknown)  

 

           (unknown)  (no       (unknown)  (unknown)  [place]  (units    (

unknown)



                    date)                         [address]     unknown)  

 

           (unknown)  (no       (unknown)  (unknown)  Anesthesia  (units    (unk

nown)



                    date)                                   unknown)  

 

           (unknown)  (no       (unknown)  (unknown)  Assessment +  (units    (u

nknown)



                    date)                         Plan      unknown)  

 

           (unknown)  (no       (unknown)  (unknown)  Attending Dr:  (units    (

unknown)



                    date)                         Jose Almodovar unknown)  



                                                  MD                  

 

           (unknown)  (no       (unknown)  (unknown)  BMI 26.2  (units    (unkno

wn)



                    date)                                   unknown)  

 

           (unknown)  (no       (unknown)  (unknown)  /70  (units    (unkn

own)



                    date)                                   unknown)  

 

           (unknown)  (no       (unknown)  (unknown)  Blood Pressure  (units    

(unknown)



                    date)                         Location Rt unknown)  



                                                  brachial            

 

           (unknown)  (no       (unknown)  (unknown)  Chief Complaint  (units   

 (unknown)



                    date)                                   unknown)  

 

           (unknown)  (no       (unknown)  (unknown)  Chief Complaint:  (units  

  (unknown)



                    date)                         Right lower unknown)  



                                                  quadrant pain,           



                                                  recurrent           



                                                  bacterial           



                                                  vaginosis           

 

           (unknown)  (no       (unknown)  (unknown)  Chlamydia  (units    (unkn

own)



                    date)                         infection () unknown)  

 

           (unknown)  (no       (unknown)  (unknown)  : 2005  (units   

 (unknown)



                    date)                         Acct:[id number] unknown)  

 

           (unknown)  (no       (unknown)  (unknown)  Dept at   (units    (unkno

wn)



                    date)                         [phone] unknown)  

 

           (unknown)  (no       (unknown)  (unknown)  Details:  (units    (unkno

wn)



                    date)                                   unknown)  

 

           (unknown)  (no       (unknown)  (unknown)  Documented By:  (units    

(unknown)



                    date)                         Jose Almodovar unknown)  



                                                  MD 22 0810           

 

           (unknown)  (no       (unknown)  (unknown)  Draft     (units    (unkno

wn)



                    date)                                   unknown)  

 

           (unknown)  (no       (unknown)  (unknown)  Endometriosis  (units    (

unknown)



                    date)                         ()   unknown)  

 

           (unknown)  (no       (unknown)  (unknown)  [facility]  (units   

 (unknown)



                    date)                         [facility] unknown)  

 

           (unknown)  (no       (unknown)  (unknown)  Kateryna returns  (units    (

unknown)



                    date)                         today after 2 unknown)  



                                                  week therapy with           



                                                  levofloxacin and           



                                                  metronidazole.           

 

           (unknown)  (no       (unknown)  (unknown)  Gynecology Visit  (units  

  (unknown)



                    date)                                   unknown)  

 

           (unknown)  (no       (unknown)  (unknown)  HPI       (units    (unkno

wn)



                    date)                                   unknown)  

 

           (unknown)  (no       (unknown)  (unknown)  Heavy menstrual  (units   

 (unknown)



                    date)                         period () unknown)  

 

           (unknown)  (no       (unknown)  (unknown)  Height 5 ft 3.5  (units   

 (unknown)



                    date)                         in        unknown)  

 

           (unknown)  (no       (unknown)  (unknown)  Intake Note:  (units    (u

nknown)



                    date)                                   unknown)  

 

           (unknown)  (no       (unknown)  (unknown)  Intake performed  (units  

  (unknown)



                    date)                         by: Alvarez Woodson unknown)  

 

           (unknown)  (no       (unknown)  (unknown)  Intake    (units    (unkno

wn)



                    date)                                   unknown)  

 

           (unknown)  (no       (unknown)  (unknown)  Intake- Clincial  (units  

  (unknown)



                    date)                         Staff     unknown)  

 

           (unknown)  (no       (unknown)  (unknown)  Irregular  (units    (unkn

own)



                    date)                         menstrual cycle unknown)  



                                                  ()             

 

           (unknown)  (no       (unknown)  (unknown)  Is last   (units    (unkno

wn)



                    date)                         menstrual period unknown)  



                                                  known: No           

 

           (unknown)  (no       (unknown)  (unknown)  Last Menstural  (units    

(unknown)



                    date)                         Cycle + Details unknown)  

 

           (unknown)  (no       (unknown)  (unknown)  Loc: [facility]  (units    (unkno

wn)



                    date)                                   unknown)  

 

           (unknown)  (no       (unknown)  (unknown)  Lymphangioma  (units    (u

nknown)



                    date)                                   unknown)  

 

           (unknown)  (no       (unknown)  (unknown)  Medical History  (units   

 (unknown)



                    date)                         (Reviewed unknown)  



                                                  22 @ 08:13           



                                                  by Alvarez Woodson RN)                 

 

           (unknown)  (no       (unknown)  (unknown)  Medications  (units    (un

known)



                    date)                                   unknown)  

 

           (unknown)  (no       (unknown)  (unknown)  Medications:  (units    (u

nknown)



                    date)                                   unknown)  

 

           (unknown)  (no       (unknown)  (unknown)  New       (units    (unkno

wn)



                    date)                                   unknown)  

 

           (unknown)  (no       (unknown)  (unknown)  Opioids -  (units    (unkn

own)



                    date)                         Morphine  unknown)  



                                                  Analogues Allergy           



                                                  (Intermediate,           



                                                  Verified 22           



                                                  08:14)              

 

           (unknown)  (no       (unknown)  (unknown)  Orders    (units    (unkno

wn)



                    date)                                   unknown)  

 

           (unknown)  (no       (unknown)  (unknown)  Orders:   (units    (unkno

wn)



                    date)                                   unknown)  

 

           (unknown)  (no       (unknown)  (unknown)  Other Menstrual  (units   

 (unknown)



                    date)                         Period: Other unknown)  

 

           (unknown)  (no       (unknown)  (unknown)  Ovarian cyst  (units    (u

nknown)



                    date)                                   unknown)  

 

           (unknown)  (no       (unknown)  (unknown)  PFSH      (units    (unkno

wn)



                    date)                                   unknown)  

 

           (unknown)  (no       (unknown)  (unknown)  Painful   (units    (unkno

wn)



                    date)                         menstrual periods unknown)  



                                                  ()             

 

           (unknown)  (no       (unknown)  (unknown)  Patient:  (units    (unkno

wn)



                    date)                         MoreKateryna unknown)  



                                                  MR#: [id number]           

 

           (unknown)  (no       (unknown)  (unknown)  Pelvic and  (units    (unk

nown)



                    date)                         perineal pain unknown)  

 

           (unknown)  (no       (unknown)  (unknown)  Position Sitting  (units  

  (unknown)



                    date)                                   unknown)  

 

           (unknown)  (no       (unknown)  (unknown)  Pt here for FU  (units    

(unknown)



                    date)                         pelvic pain. unknown)  



                                                  States she is           



                                                  still having pain           

 

           (unknown)  (no       (unknown)  (unknown)  Reason For Visit  (units  

  (unknown)



                    date)                                   unknown)  

 

           (unknown)  (no       (unknown)  (unknown)  S/P ACL   (units    (unkno

wn)



                    date)                         reconstruction unknown)  



                                                  ()           

 

           (unknown)  (no       (unknown)  (unknown)  She is not noted  (units  

  (unknown)



                    date)                         any significant unknown)  



                                                  difference in her           



                                                  right lower           



                                                  quadrant/right           

 

           (unknown)  (no       (unknown)  (unknown)  Signed By:  (units    (unk

nown)



                    date)                                   unknown)  

 

           (unknown)  (no       (unknown)  (unknown)  Smoking Status:  (units   

 (unknown)



                    date)                         Never smoker unknown)  

 

           (unknown)  (no       (unknown)  (unknown)  Surgical History  (units  

  (unknown)



                    date)                         (Reviewed unknown)  



                                                  22 @ 08:13           



                                                  by Alvarez Woodson RN)                 

 

           (unknown)  (no       (unknown)  (unknown)  TOA       (units    (unkno

wn)



                    date)                         (tubo-ovarian unknown)  



                                                  abscess)            



                                                  ()           

 

           (unknown)  (no       (unknown)  (unknown)  This note may  (units    (

unknown)



                    date)                         have been all or unknown)  



                                                  partially           



                                                  generated using           



                                                  voice recognition           

 

           (unknown)  (no       (unknown)  (unknown)  Tobacco +  (units    (unkn

own)



                    date)                         Substance Use unknown)  

 

           (unknown)  (no       (unknown)  (unknown)  Tobacco Status  (units    

(unknown)



                    date)                                   unknown)  

 

           (unknown)  (no       (unknown)  (unknown)  US pelvic  (units    (unkn

own)



                    date)                         complete 1 Week unknown)  



                                                  G89.29 - Other           



                                                  chronic pain,           



                                                  N70.11 - Chronic           

 

           (unknown)  (no       (unknown)  (unknown)  Visit Reasons:  (units    

(unknown)



                    date)                         F/U Chronic unknown)  



                                                  Pelvic Pain           

 

           (unknown)  (no       (unknown)  (unknown)  Vitals    (units    (unkno

wn)



                    date)                                   unknown)  

 

           (unknown)  (no       (unknown)  (unknown)  Weight 150 lb 1  (units   

 (unknown)



                    date)                         oz        unknown)  

 

           (unknown)  (no       (unknown)  (unknown)  as the cause of  (units   

 (unknown)



                    date)                         diseases  unknown)  



                                                  classified           



                                                  elsewhere, N76.0           



                                                  - Acute             



                                                  vaginitis, R10.2           

 

           (unknown)  (no       (unknown)  (unknown)  clindamycin HCl  (units   

 (unknown)



                    date)                         150 mg PO DAILY unknown)  



                                                  30 caps 0RF           

 

           (unknown)  (no       (unknown)  (unknown)  clindamycin HCl  (units   

 (unknown)



                    date)                         150 mg capsule unknown)  



                                                  150 mg PO DAILY           



                                                  #30 caps 22           



                                                  [Rx Confirmed           

 

           (unknown)  (no       (unknown)  (unknown)  doxycycline  (units    (un

known)



                    date)                         Adverse Reaction unknown)  



                                                  (Intermediate,           



                                                  Verified 22           



                                                  08:14)              

 

           (unknown)  (no       (unknown)  (unknown)  evaluation/treat  (units  

  (unknown)



                    date)                         ment of her unknown)  



                                                  RLQ/pelvic pain.           

 

           (unknown)  (no       (unknown)  (unknown)  have occurred.  (units    

(unknown)



                    date)                         If there are any unknown)  



                                                  questions, please           



                                                  contact the           



                                                  Medical Records           

 

           (unknown)  (no       (unknown)  (unknown)  lower quadrant  (units    

(unknown)



                    date)                         pain      unknown)  

 

           (unknown)  (no       (unknown)  (unknown)  may occur.  (units    (unk

nown)



                    date)                         Occasional unknown)  



                                                  wrong-word or           



                                                  'sound-alike'           



                                                  substitutions may           



                                                  have                

 

           (unknown)  (no       (unknown)  (unknown)  occurred due to  (units   

 (unknown)



                    date)                         the inherent unknown)  



                                                  limitations of           



                                                  voice recognition           



                                                  software. Please           

 

           (unknown)  (no       (unknown)  (unknown)  oxycodone 5 mg  (units    

(unknown)



                    date)                         tablet 5 mg PO unknown)  



                                                  Q6H PRN pain #10           



                                                  tabs 22 [Rx           



                                                  Confirmed           

 

           (unknown)  (no       (unknown)  (unknown) promethazine 25  (units    

(unknown)



                    date)                         mg tablet 25 mg unknown)  



                                                  PO Q6H PRN           



                                                  22 [History           



                                                  Confirmed           



                                                  22]           

 

           (unknown)  (no       (unknown)  (unknown)  rash      (units    (unkno

wn)



                    date)                                   unknown)  

 

           (unknown)  (no       (unknown)  (unknown)  read the note  (units    (

unknown)



                    date)                         carefully and unknown)  



                                                  recognize, using           



                                                  context, where           



                                                  these               



                                                  substitutions           

 

           (unknown)  (no       (unknown)  (unknown)  salpingitis,  (units    (u

nknown)



                    date)                         N70.93 -  unknown)  



                                                  Salpingitis and           



                                                  oophoritis,           



                                                  unspecified,           



                                                  R10.31 - Right           

 

           (unknown)  (no       (unknown)  (unknown)  sided pelvic  (units    (u

nknown)



                    date)                         pain. In addition unknown)  



                                                  her bacterial           



                                                  vaginosis which           



                                                  initially           



                                                  improved            

 

           (unknown)  (no       (unknown)  (unknown)  software.  (units    (unkn

own)



                    date)                         Although every unknown)  



                                                  effort is made to           



                                                  edit content,           



                                                  transcription           



                                                  errors              

 

           (unknown)  (no       (unknown)  (unknown)  vomitt    (units    (unkno

wn)



                    date)                                   unknown)  

 

           (unknown)  (no       (unknown)  (unknown)  with therapy has  (units  

  (unknown)



                    date)                         returned. Patient unknown)  



                                                  wishes to proceed           



                                                  with definitive           









                                         Result panel 14









           (unknown)  (no       (unknown)  (unknown)  (no value)  (units    (unk

nown)



                    date)                                   unknown)  

 

           (unknown)  (no       (unknown)  (unknown)  08:12     (units    (unkno

wn)



                    date)                                   unknown)  

 

           (unknown)  (no       (unknown)  (unknown)  22  (units    (unkno

wn)



                    date)                                   unknown)  

 

           (unknown)  (no       (unknown)  (unknown)  22]  (units    (unkn

own)



                    date)                                   unknown)  

 

           (unknown)  (no       (unknown)  (unknown)  [id number]    (units    (unkno

wn)



                    date)                                   unknown)  

 

           (unknown)  (no       (unknown)  (unknown)  Acne (-2016)  (units    (u

nknown)



                    date)                                   unknown)  

 

           (unknown)  (no       (unknown)  (unknown)  Affect: normal  (units    

(unknown)



                    date)                         affect    unknown)  

 

           (unknown)  (no       (unknown)  (unknown)  Affirm Vaginosis  (units  

  (unknown)



                    date)                         Pan Bacterial unknown)  



                                                  Today B96.89 -           



                                                  Other specified           



                                                  bacterial agents           

 

           (unknown)  (no       (unknown)  (unknown)  Age/Sex: 17 / F  (units   

 (unknown)



                    date)                         Date of Service: unknown)  

 

           (unknown)  (no       (unknown)  (unknown)  Allergies  (units    (unkn

own)



                    date)                                   unknown)  

 

           (unknown)  (no       (unknown)  (unknown)  [place]  (units    (

unknown)



                    date)                         [id number]     unknown)  

 

           (unknown)  (no       (unknown)  (unknown)  Anesthesia  (units    (unk

nown)



                    date)                                   unknown)  

 

           (unknown)  (no       (unknown)  (unknown)  Appearance:  (units    (un

known)



                    date)                         grossly normal unknown)  

 

           (unknown)  (no       (unknown)  (unknown)  Assessment +  (units    (u

nknown)



                    date)                         Plan      unknown)  

 

           (unknown)  (no       (unknown)  (unknown)  Attending Dr:  (units    (

unknown)



                    date)                         Jose Almodovar unknown)  



                                                  MD                  

 

           (unknown)  (no       (unknown)  (unknown)  Attitude:  (units    (unkn

own)



                    date)                         cooperative unknown)  

 

           (unknown)  (no       (unknown)  (unknown)  BMI 26.2  (units    (unkno

wn)



                    date)                                   unknown)  

 

           (unknown)  (no       (unknown)  (unknown)  /70  (units    (unkn

own)



                    date)                                   unknown)  

 

           (unknown)  (no       (unknown)  (unknown)  Blood Pressure  (units    

(unknown)



                    date)                         Location Rt unknown)  



                                                  brachial            

 

           (unknown)  (no       (unknown)  (unknown)  Chief Complaint  (units   

 (unknown)



                    date)                                   unknown)  

 

           (unknown)  (no       (unknown)  (unknown)  Chief Complaint:  (units  

  (unknown)



                    date)                         Right lower unknown)  



                                                  quadrant pain,           



                                                  recurrent           



                                                  bacterial           



                                                  vaginosis           

 

           (unknown)  (no       (unknown)  (unknown)  Chlamydia  (units    (unkn

own)



                    date)                         infection () unknown)  

 

           (unknown)  (no       (unknown)  (unknown)  Conjunctivae:  (units    (

unknown)



                    date)                         conjunctivae unknown)  



                                                  normal              

 

           (unknown)  (no       (unknown)  (unknown)  Const     (units    (unkno

wn)



                    date)                                   unknown)  

 

           (unknown)  (no       (unknown)  (unknown)  : 2005  (units   

 (unknown)



                    date)                         Acct:[id number] unknown)  

 

           (unknown)  (no       (unknown)  (unknown)  Dept at   (units    (unkno

wn)



                    date)                         [phone]. unknown)  

 

           (unknown)  (no       (unknown)  (unknown)  Details:  (units    (unkno

wn)



                    date)                                   unknown)  

 

           (unknown)  (no       (unknown)  (unknown)  Documented By:  (units    

(unknown)



                    date)                         Jose Almodovar unknown)  



                                                  MD 22 0810           

 

           (unknown)  (no       (unknown)  (unknown)  Draft     (units    (unkno

wn)



                    date)                                   unknown)  

 

           (unknown)  (no       (unknown)  (unknown)  EOM: EOM intact  (units   

 (unknown)



                    date)                         bilaterally unknown)  

 

           (unknown)  (no       (unknown)  (unknown)  Ears: hearing  (units    (

unknown)



                    date)                         grossly normal unknown)  



                                                  bilaterally           

 

           (unknown)  (no       (unknown)  (unknown)  Effort +  (units    (unkno

wn)



                    date)                         Inspection: unknown)  



                                                  normal              



                                                  respiratory           



                                                  effort and able           



                                                  to speak in           



                                                  complete            

 

           (unknown)  (no       (unknown)  (unknown)  Endometriosis  (units    (

unknown)



                    date)                         (-)   unknown)  

 

           (unknown)  (no       (unknown)  (unknown)  Exam      (units    (unkno

wn)



                    date)                                   unknown)  

 

           (unknown)  (no       (unknown)  (unknown)  External Female  (units   

 (unknown)



                    date)                         Exam: normal unknown)  



                                                  external            



                                                  appearance and           



                                                  normal appearance           



                                                  of the              

 

           (unknown)  (no       (unknown)  (unknown)  Eyes      (units    (unkno

wn)



                    date)                                   unknown)  

 

           (unknown)  (no       (unknown)  (unknown)  Face and sinus:  (units   

 (unknown)



                    date)                         face symmetric unknown)  

 

           (unknown)  (no       (unknown)  (unknown)  [facility]  (units   

 (unknown)



                    date)                         [facility] unknown)  

 

           (unknown)  (no       (unknown)  (unknown)  GI        (units    (unkno

wn)



                    date)                                   unknown)  

 

           (unknown)  (no       (unknown)  (unknown)          (units    (unkno

wn)



                    date)                                   unknown)  

 

           (unknown)  (no       (unknown)  (unknown)  General:  (units    (unkno

wn)



                    date)                         appearance unknown)  



                                                  normal, both eyes           



                                                  and all related           



                                                  structures           

 

           (unknown)  (no       (unknown)  (unknown)  General:  (units    (unkno

wn)



                    date)                         cooperative, unknown)  



                                                  comfortable and           



                                                  no acute distress           

 

           (unknown)  (no       (unknown)  (unknown)  Kateryna returns  (units    (

unknown)



                    date)                         today after 2 unknown)  



                                                  week therapy with           



                                                  levofloxacin and           



                                                  metronidazole.           

 

           (unknown)  (no       (unknown)  (unknown)  Gynecology Visit  (units  

  (unknown)



                    date)                                   unknown)  

 

           (unknown)  (no       (unknown)  (unknown)  HENMT     (units    (unkno

wn)



                    date)                                   unknown)  

 

           (unknown)  (no       (unknown)  (unknown)  HPI       (units    (unkno

wn)



                    date)                                   unknown)  

 

           (unknown)  (no       (unknown)  (unknown)  Head: normal to  (units   

 (unknown)



                    date)                         inspection, unknown)  



                                                  normocephalic and           



                                                  atraumatic           

 

           (unknown)  (no       (unknown)  (unknown)  Heavy menstrual  (units   

 (unknown)



                    date)                         period (-2019) unknown)  

 

           (unknown)  (no       (unknown)  (unknown)  Height 5 ft 3.5  (units   

 (unknown)



                    date)                         in        unknown)  

 

           (unknown)  (no       (unknown)  (unknown)  Inspection:  (units    (un

known)



                    date)                         normal to unknown)  



                                                  inspection           

 

           (unknown)  (no       (unknown)  (unknown)  Intake Note:  (units    (u

nknown)



                    date)                                   unknown)  

 

           (unknown)  (no       (unknown)  (unknown)  Intake performed  (units  

  (unknown)



                    date)                         by: Alvarez Woodson unknown)  

 

           (unknown)  (no       (unknown)  (unknown)  Intake    (units    (unkno

wn)



                    date)                                   unknown)  

 

           (unknown)  (no       (unknown)  (unknown)  Intake- Clincial  (units  

  (unknown)



                    date)                         Staff     unknown)  

 

           (unknown)  (no       (unknown)  (unknown)  Irregular  (units    (unkn

own)



                    date)                         menstrual cycle unknown)  



                                                  ()             

 

           (unknown)  (no       (unknown)  (unknown)  Is last   (units    (unkno

wn)



                    date)                         menstrual period unknown)  



                                                  known: No           

 

           (unknown)  (no       (unknown)  (unknown)  Judgment:  (units    (unkn

own)



                    date)                         judgment good unknown)  

 

           (unknown)  (no       (unknown)  (unknown)  Last Menstural  (units    

(unknown)



                    date)                         Cycle + Details unknown)  

 

           (unknown)  (no       (unknown)  (unknown)  Loc: [facility]  (units    (unkno

wn)



                    date)                                   unknown)  

 

           (unknown)  (no       (unknown)  (unknown)  Lymphangioma  (units    (u

nknown)



                    date)                                   unknown)  

 

           (unknown)  (no       (unknown)  (unknown)  Medical History  (units   

 (unknown)



                    date)                         (Reviewed unknown)  



                                                  22 @ 08:13           



                                                  by Alvarez Woodson RN)                 

 

           (unknown)  (no       (unknown)  (unknown)  Medications  (units    (un

known)



                    date)                                   unknown)  

 

           (unknown)  (no       (unknown)  (unknown)  Medications:  (units    (u

nknown)



                    date)                                   unknown)  

 

           (unknown)  (no       (unknown)  (unknown)  Mental Status:  (units    

(unknown)



                    date)                         mental status unknown)  



                                                  grossly normal           

 

           (unknown)  (no       (unknown)  (unknown)  Mood: congruent  (units   

 (unknown)



                    date)                         mood      unknown)  

 

           (unknown)  (no       (unknown)  (unknown)  Neck      (units    (unkno

wn)



                    date)                                   unknown)  

 

           (unknown)  (no       (unknown)  (unknown)  Neck: normal  (units    (u

nknown)



                    date)                         visual inspection unknown)  

 

           (unknown)  (no       (unknown)  (unknown)  New       (units    (unkno

wn)



                    date)                                   unknown)  

 

           (unknown)  (no       (unknown)  (unknown)  Nutritional  (units    (un

known)



                    date)                         Appearance: unknown)  



                                                  average body           



                                                  habitus             

 

           (unknown)  (no       (unknown)  (unknown)  Opioids -  (units    (unkn

own)



                    date)                         Morphine  unknown)  



                                                  Analogues Allergy           



                                                  (Intermediate,           



                                                  Verified 22           



                                                  08:14)              

 

           (unknown)  (no       (unknown)  (unknown)  Orders    (units    (unkno

wn)



                    date)                                   unknown)  

 

           (unknown)  (no       (unknown)  (unknown)  Orders:   (units    (unkno

wn)



                    date)                                   unknown)  

 

           (unknown)  (no       (unknown)  (unknown)  Orientation:  (units    (u

nknown)



                    date)                         alert and unknown)  



                                                  oriented x3           

 

           (unknown)  (no       (unknown)  (unknown)  Other Menstrual  (units   

 (unknown)



                    date)                         Period: Other unknown)  

 

           (unknown)  (no       (unknown)  (unknown)  Ovarian cyst  (units    (u

nknown)



                    date)                                   unknown)  

 

           (unknown)  (no       (unknown)  (unknown)  PFSH      (units    (unkno

wn)



                    date)                                   unknown)  

 

           (unknown)  (no       (unknown)  (unknown)  Painful   (units    (unkno

wn)



                    date)                         menstrual periods unknown)  



                                                  ()             

 

           (unknown)  (no       (unknown)  (unknown)  Palpation: soft,  (units  

  (unknown)



                    date)                         no        unknown)  



                                                  hepatosplenomegal           



                                                  y, no masses and           



                                                  tender in the RLQ           

 

           (unknown)  (no       (unknown)  (unknown)  Patient:  (units    (unkno

wn)



                    date)                         Kateryna Jenkins unknown)  



                                                  MR#: [id number]           

 

           (unknown)  (no       (unknown)  (unknown)  Pelvic and  (units    (unk

nown)



                    date)                         perineal pain unknown)  

 

           (unknown)  (no       (unknown)  (unknown)  Position Sitting  (units  

  (unknown)



                    date)                                   unknown)  

 

           (unknown)  (no       (unknown)  (unknown)  Problem-specific  (units  

  (unknown)



                    date)                         ROS positives unknown)  



                                                  included with the           



                                                  HPI                 

 

           (unknown)  (no       (unknown)  (unknown)  Psych     (units    (unkno

wn)



                    date)                                   unknown)  

 

           (unknown)  (no       (unknown)  (unknown)  Pt here for FU  (units    

(unknown)



                    date)                         pelvic pain. unknown)  



                                                  States she is           



                                                  still having pain           

 

           (unknown)  (no       (unknown)  (unknown)  ROS Narrative  (units    (

unknown)



                    date)                                   unknown)  

 

           (unknown)  (no       (unknown)  (unknown)  ROS Narrative:  (units    

(unknown)



                    date)                                   unknown)  

 

           (unknown)  (no       (unknown)  (unknown)  ROS       (units    (unkno

wn)



                    date)                                   unknown)  

 

           (unknown)  (no       (unknown)  (unknown)  Reason For Visit  (units  

  (unknown)



                    date)                                   unknown)  

 

           (unknown)  (no       (unknown)  (unknown)  Resp      (units    (unkno

wn)



                    date)                                   unknown)  

 

           (unknown)  (no       (unknown)  (unknown)  S/P ACL   (units    (unkno

wn)



                    date)                         reconstruction unknown)  



                                                  (-21)           

 

           (unknown)  (no       (unknown)  (unknown)  Sclera: sclerae  (units   

 (unknown)



                    date)                         normal    unknown)  

 

           (unknown)  (no       (unknown)  (unknown)  She is not noted  (units  

  (unknown)



                    date)                         any significant unknown)  



                                                  difference in her           



                                                  right lower           



                                                  quadrant/right           

 

           (unknown)  (no       (unknown)  (unknown)  Signed By:  (units    (unk

nown)



                    date)                                   unknown)  

 

           (unknown)  (no       (unknown)  (unknown)  Smoking Status:  (units   

 (unknown)



                    date)                         Never smoker unknown)  

 

           (unknown)  (no       (unknown)  (unknown)  Speculum Exam -  (units   

 (unknown)



                    date)                         Vagina: normal unknown)  



                                                  appearance of the           



                                                  vagina and           



                                                  abnormal vaginal           

 

           (unknown)  (no       (unknown)  (unknown)  Speech and  (units    (unk

nown)



                    date)                         Movement: speech unknown)  



                                                  and movement           



                                                  normal              

 

           (unknown)  (no       (unknown)  (unknown)  Surgical History  (units  

  (unknown)



                    date)                         (Reviewed unknown)  



                                                  22 @ 08:13           



                                                  by Alvarez Woodson RN)                 

 

           (unknown)  (no       (unknown)  (unknown)  TOA       (units    (unkno

wn)



                    date)                         (tubo-ovarian unknown)  



                                                  abscess)            



                                                  ()           

 

           (unknown)  (no       (unknown)  (unknown)  This note may  (units    (

unknown)



                    date)                         have been all or unknown)  



                                                  partially           



                                                  generated using           



                                                  voice recognition           

 

           (unknown)  (no       (unknown)  (unknown)  Thought Content:  (units  

  (unknown)



                    date)                         normal    unknown)  

 

           (unknown)  (no       (unknown)  (unknown)  Thought Process:  (units  

  (unknown)



                    date)                         normal    unknown)  

 

           (unknown)  (no       (unknown)  (unknown)  Tobacco +  (units    (unkn

own)



                    date)                         Substance Use unknown)  

 

           (unknown)  (no       (unknown)  (unknown)  Tobacco Status  (units    

(unknown)



                    date)                                   unknown)  

 

           (unknown)  (no       (unknown)  (unknown)  US pelvic  (units    (unkn

own)



                    date)                         complete 1 Week unknown)  



                                                  G89.29 - Other           



                                                  chronic pain,           



                                                  N70.11 - Chronic           

 

           (unknown)  (no       (unknown)  (unknown)  Urethra: normal  (units   

 (unknown)



                    date)                         appearance of the unknown)  



                                                  urethra             

 

           (unknown)  (no       (unknown)  (unknown)  Visit Reasons:  (units    

(unknown)



                    date)                         F/U Chronic unknown)  



                                                  Pelvic Pain           

 

           (unknown)  (no       (unknown)  (unknown)  Vitals    (units    (unkno

wn)



                    date)                                   unknown)  

 

           (unknown)  (no       (unknown)  (unknown)  Weight 150 lb 1  (units   

 (unknown)



                    date)                         oz        unknown)  

 

           (unknown)  (no       (unknown)  (unknown)  after completing  (units  

  (unknown)



                    date)                         her antibiotics. unknown)  



                                                  Patient is           



                                                  sexually active           



                                                  but her partner           

 

           (unknown)  (no       (unknown)  (unknown)  as the cause of  (units   

 (unknown)



                    date)                         diseases  unknown)  



                                                  classified           



                                                  elsewhere, N76.0           



                                                  - Acute             



                                                  vaginitis, R10.2           

 

           (unknown)  (no       (unknown)  (unknown)  clindamycin HCl  (units   

 (unknown)



                    date)                         150 mg PO DAILY unknown)  



                                                  30 caps 0RF           

 

           (unknown)  (no       (unknown)  (unknown)  clindamycin HCl  (units   

 (unknown)



                    date)                         150 mg capsule unknown)  



                                                  150 mg PO DAILY           



                                                  #30 caps 22           



                                                  [Rx Confirmed           

 

           (unknown)  (no       (unknown)  (unknown)  discharge  (units    (unkn

own)



                    date)                         (Thick, white, + unknown)  



                                                  amine odor;           



                                                  AFFIRM              



                                                  submitted.)           

 

           (unknown)  (no       (unknown)  (unknown)  doxycycline  (units    (un

known)



                    date)                         Adverse Reaction unknown)  



                                                  (Intermediate,           



                                                  Verified 22           



                                                  08:14)              

 

           (unknown)  (no       (unknown)  (unknown)  evaluation/treat  (units  

  (unknown)



                    date)                         ment of her unknown)  



                                                  RLQ/pelvic pain.           



                                                  In addition her           



                                                  bacterial           

 

           (unknown)  (no       (unknown)  (unknown)  have occurred.  (units    

(unknown)



                    date)                         If there are any unknown)  



                                                  questions, please           



                                                  contact the           



                                                  Medical Records           

 

           (unknown)  (no       (unknown)  (unknown)  lower quadrant  (units    

(unknown)



                    date)                         pain      unknown)  

 

           (unknown)  (no       (unknown)  (unknown)  may occur.  (units    (unk

nown)



                    date)                         Occasional unknown)  



                                                  wrong-word or           



                                                  'sound-alike'           



                                                  substitutions may           



                                                  have                

 

           (unknown)  (no       (unknown)  (unknown)  occurred due to  (units   

 (unknown)



                    date)                         the inherent unknown)  



                                                  limitations of           



                                                  voice recognition           



                                                  software. Please           

 

           (unknown)  (no       (unknown)  (unknown)  oxycodone 5 mg  (units    

(unknown)



                    date)                         tablet 5 mg PO unknown)  



                                                  Q6H PRN pain #10           



                                                  tabs 22 [Rx           



                                                  Confirmed           

 

           (unknown)  (no       (unknown)  (unknown) promethazine 25  (units    

(unknown)



                    date)                         mg tablet 25 mg unknown)  



                                                  PO Q6H PRN           



                                                  22 [History           



                                                  Confirmed           



                                                  22]           

 

           (unknown)  (no       (unknown)  (unknown)  rash      (units    (unkno

wn)



                    date)                                   unknown)  

 

           (unknown)  (no       (unknown)  (unknown)  read the note  (units    (

unknown)



                    date)                         carefully and unknown)  



                                                  recognize, using           



                                                  context, where           



                                                  these               



                                                  substitutions           

 

           (unknown)  (no       (unknown)  (unknown)  salpingitis,  (units    (u

nknown)



                    date)                         N70.93 -  unknown)  



                                                  Salpingitis and           



                                                  oophoritis,           



                                                  unspecified,           



                                                  R10.31 - Right           

 

           (unknown)  (no       (unknown)  (unknown)  sentences  (units    (unkn

own)



                    date)                                   unknown)  

 

           (unknown)  (no       (unknown)  (unknown)  sided pelvic  (units    (u

nknown)



                    date)                         pain. Patient unknown)  



                                                  wishes to proceed           



                                                  with definitive           

 

           (unknown)  (no       (unknown)  (unknown)  software.  (units    (unkn

own)



                    date)                         Although every unknown)  



                                                  effort is made to           



                                                  edit content,           



                                                  transcription           



                                                  errors              

 

           (unknown)  (no       (unknown)  (unknown)  treated for BV.  (units   

 (unknown)



                    date)                                   unknown)  

 

           (unknown)  (no       (unknown)  (unknown)  urethra   (units    (unkno

wn)



                    date)                                   unknown)  

 

           (unknown)  (no       (unknown)  (unknown)  uses condoms  (units    (u

nknown)



                    date)                         with each sexual unknown)  



                                                  encounter.           



                                                  Patient's           



                                                  partners has           



                                                  never been           

 

           (unknown)  (no       (unknown)  (unknown)  vaginosis which  (units   

 (unknown)



                    date)                         initially unknown)  



                                                  improved with           



                                                  therapy has           



                                                  returned within a           



                                                  few days            

 

           (unknown)  (no       (unknown)  (unknown)  vomitt    (units    (unkno

wn)



                    date)                                   unknown)  









                                         Result panel 15









           (unknown)  (no       (unknown)  (unknown)  (no value)  (units    (unk

nown)



                    date)                                   unknown)  

 

           (unknown)  (no       (unknown)  (unknown)  08:12     (units    (unkno

wn)



                    date)                                   unknown)  

 

           (unknown)  (no       (unknown)  (unknown)  22  (units    (unkno

wn)



                    date)                                   unknown)  

 

           (unknown)  (no       (unknown)  (unknown)  22]  (units    (unkn

own)



                    date)                                   unknown)  

 

           (unknown)  (no       (unknown)  (unknown)  [id number]    (units    (unkno

wn)



                    date)                                   unknown)  

 

           (unknown)  (no       (unknown)  (unknown)  Acne (-2016)  (units    (u

nknown)



                    date)                                   unknown)  

 

           (unknown)  (no       (unknown)  (unknown)  Affect: normal  (units    

(unknown)



                    date)                         affect    unknown)  

 

           (unknown)  (no       (unknown)  (unknown)  Affirm Vaginosis  (units  

  (unknown)



                    date)                         Pan Bacterial unknown)  



                                                  Today B96.89 -           



                                                  Other specified           



                                                  bacterial agents           

 

           (unknown)  (no       (unknown)  (unknown)  Age/Sex: 17 / F  (units   

 (unknown)



                    date)                         Date of Service: unknown)  

 

           (unknown)  (no       (unknown)  (unknown)  Allergies  (units    (unkn

own)



                    date)                                   unknown)  

 

           (unknown)  (no       (unknown)  (unknown)  [place]  (units    (

unknown)



                    date)                         [id number]     unknown)  

 

           (unknown)  (no       (unknown)  (unknown)  Anesthesia  (units    (unk

nown)



                    date)                                   unknown)  

 

           (unknown)  (no       (unknown)  (unknown)  Appearance:  (units    (un

known)



                    date)                         grossly normal unknown)  

 

           (unknown)  (no       (unknown)  (unknown)  Assessment +  (units    (u

nknown)



                    date)                         Plan      unknown)  

 

           (unknown)  (no       (unknown)  (unknown)  Attending Dr:  (units    (

unknown)



                    date)                         Jose Almodovar unknown)  



                                                  MD                  

 

           (unknown)  (no       (unknown)  (unknown)  Attitude:  (units    (unkn

own)



                    date)                         cooperative unknown)  

 

           (unknown)  (no       (unknown)  (unknown)  BMI 26.2  (units    (unkno

wn)



                    date)                                   unknown)  

 

           (unknown)  (no       (unknown)  (unknown)  /70  (units    (unkn

own)



                    date)                                   unknown)  

 

           (unknown)  (no       (unknown)  (unknown) Bimanual Exam-  (units    (

unknown)



                    date)                         Adnexa, other: unknown)  



                                                  tender (R>>L),           



                                                  mass (Very tender           



                                                  fullness, right           

 

           (unknown)  (no       (unknown)  (unknown)  Bimanual Exam-  (units    

(unknown)



                    date)                         Vagina + Uterus: unknown)  



                                                  No tender           

 

           (unknown)  (no       (unknown)  (unknown)  Blood Pressure  (units    

(unknown)



                    date)                         Location Rt unknown)  



                                                  brachial            

 

           (unknown)  (no       (unknown)  (unknown)  Chief Complaint  (units   

 (unknown)



                    date)                                   unknown)  

 

           (unknown)  (no       (unknown)  (unknown)  Chief Complaint:  (units  

  (unknown)



                    date)                         Right lower unknown)  



                                                  quadrant pain,           



                                                  recurrent           



                                                  bacterial           



                                                  vaginosis           

 

           (unknown)  (no       (unknown)  (unknown)  Chlamydia  (units    (unkn

own)



                    date)                         infection () unknown)  

 

           (unknown)  (no       (unknown)  (unknown)  Conjunctivae:  (units    (

unknown)



                    date)                         conjunctivae unknown)  



                                                  normal              

 

           (unknown)  (no       (unknown)  (unknown)  Const     (units    (unkno

wn)



                    date)                                   unknown)  

 

           (unknown)  (no       (unknown)  (unknown)  : 2005  (units   

 (unknown)



                    date)                         Acct:[id number] unknown)  

 

           (unknown)  (no       (unknown)  (unknown)  Dept at   (units    (unkno

wn)



                    date)                         [phone] unknown)  

 

           (unknown)  (no       (unknown)  (unknown)  Details:  (units    (unkno

wn)



                    date)                                   unknown)  

 

           (unknown)  (no       (unknown)  (unknown)  Documented By:  (units    

(unknown)



                    date)                         Jose Almodovar unknown)  



                                                  MD 22 0810           

 

           (unknown)  (no       (unknown)  (unknown)  Draft     (units    (unkno

wn)



                    date)                                   unknown)  

 

           (unknown)  (no       (unknown)  (unknown)  EOM: EOM intact  (units   

 (unknown)



                    date)                         bilaterally unknown)  

 

           (unknown)  (no       (unknown)  (unknown)  Ears: hearing  (units    (

unknown)



                    date)                         grossly normal unknown)  



                                                  bilaterally           

 

           (unknown)  (no       (unknown)  (unknown)  Effort +  (units    (unkno

wn)



                    date)                         Inspection: unknown)  



                                                  normal              



                                                  respiratory           



                                                  effort and able           



                                                  to speak in           



                                                  complete            

 

           (unknown)  (no       (unknown)  (unknown)  Endometriosis  (units    (

unknown)



                    date)                         ()   unknown)  

 

           (unknown)  (no       (unknown)  (unknown)  Exam      (units    (unkno

wn)



                    date)                                   unknown)  

 

           (unknown)  (no       (unknown)  (unknown)  External Female  (units   

 (unknown)



                    date)                         Exam: normal unknown)  



                                                  external            



                                                  appearance,           



                                                  normal appearance           



                                                  of the              

 

           (unknown)  (no       (unknown)  (unknown)  Eyes      (units    (unkno

wn)



                    date)                                   unknown)  

 

           (unknown)  (no       (unknown)  (unknown)  Face and sinus:  (units   

 (unknown)



                    date)                         face symmetric unknown)  

 

           (unknown)  (no       (unknown)  (unknown)  [facility]  (units   

 (unknown)



                    date)                         [facility] unknown)  

 

           (unknown)  (no       (unknown)  (unknown)  GI        (units    (unkno

wn)



                    date)                                   unknown)  

 

           (unknown)  (no       (unknown)  (unknown)          (units    (unkno

wn)



                    date)                                   unknown)  

 

           (unknown)  (no       (unknown)  (unknown)  General:  (units    (unkno

wn)



                    date)                         appearance unknown)  



                                                  normal, both eyes           



                                                  and all related           



                                                  structures           

 

           (unknown)  (no       (unknown)  (unknown)  General:  (units    (unkno

wn)



                    date)                         cooperative, unknown)  



                                                  comfortable and           



                                                  no acute distress           

 

           (unknown)  (no       (unknown)  (unknown)  Kateryna returns  (units    (

unknown)



                    date)                         today after 2 unknown)  



                                                  week therapy with           



                                                  levofloxacin and           



                                                  metronidazole.           

 

           (unknown)  (no       (unknown)  (unknown)  Gynecology Visit  (units  

  (unknown)



                    date)                                   unknown)  

 

           (unknown)  (no       (unknown)  (unknown)  HENMT     (units    (unkno

wn)



                    date)                                   unknown)  

 

           (unknown)  (no       (unknown)  (unknown)  HPI       (units    (unkno

wn)



                    date)                                   unknown)  

 

           (unknown)  (no       (unknown)  (unknown)  Head: normal to  (units   

 (unknown)



                    date)                         inspection, unknown)  



                                                  normocephalic and           



                                                  atraumatic           

 

           (unknown)  (no       (unknown)  (unknown)  Heavy menstrual  (units   

 (unknown)



                    date)                         period (-2019) unknown)  

 

           (unknown)  (no       (unknown)  (unknown)  Height 5 ft 3.5  (units   

 (unknown)



                    date)                         in        unknown)  

 

           (unknown)  (no       (unknown)  (unknown)  Inspection:  (units    (un

known)



                    date)                         normal to unknown)  



                                                  inspection           

 

           (unknown)  (no       (unknown)  (unknown)  Intake Note:  (units    (u

nknown)



                    date)                                   unknown)  

 

           (unknown)  (no       (unknown)  (unknown)  Intake performed  (units  

  (unknown)



                    date)                         by: Alvarez Woodson unknown)  

 

           (unknown)  (no       (unknown)  (unknown)  Intake    (units    (unkno

wn)



                    date)                                   unknown)  

 

           (unknown)  (no       (unknown)  (unknown)  Intake- Clincial  (units  

  (unknown)



                    date)                         Staff     unknown)  

 

           (unknown)  (no       (unknown)  (unknown)  Irregular  (units    (unkn

own)



                    date)                         menstrual cycle unknown)  



                                                  (-)             

 

           (unknown)  (no       (unknown)  (unknown)  Is last   (units    (unkno

wn)



                    date)                         menstrual period unknown)  



                                                  known: No           

 

           (unknown)  (no       (unknown)  (unknown)  Judgment:  (units    (unkn

own)



                    date)                         judgment good unknown)  

 

           (unknown)  (no       (unknown)  (unknown)  Last Menstural  (units    

(unknown)



                    date)                         Cycle + Details unknown)  

 

           (unknown)  (no       (unknown)  (unknown)  Loc: [facility]  (units    (unkno

wn)



                    date)                                   unknown)  

 

           (unknown)  (no       (unknown)  (unknown)  Lymphangioma  (units    (u

nknown)



                    date)                                   unknown)  

 

           (unknown)  (no       (unknown)  (unknown)  Medical History  (units   

 (unknown)



                    date)                         (Reviewed unknown)  



                                                  22 @ 08:13           



                                                  by Alvarez Woodson RN)                 

 

           (unknown)  (no       (unknown)  (unknown)  Medications  (units    (un

known)



                    date)                                   unknown)  

 

           (unknown)  (no       (unknown)  (unknown)  Medications:  (units    (u

nknown)



                    date)                                   unknown)  

 

           (unknown)  (no       (unknown)  (unknown)  Mental Status:  (units    

(unknown)



                    date)                         mental status unknown)  



                                                  grossly normal           

 

           (unknown)  (no       (unknown)  (unknown)  Mood: congruent  (units   

 (unknown)



                    date)                         mood      unknown)  

 

           (unknown)  (no       (unknown)  (unknown)  Neck      (units    (unkno

wn)



                    date)                                   unknown)  

 

           (unknown)  (no       (unknown)  (unknown)  Neck: normal  (units    (u

nknown)



                    date)                         visual inspection unknown)  

 

           (unknown)  (no       (unknown)  (unknown)  New       (units    (unkno

wn)



                    date)                                   unknown)  

 

           (unknown)  (no       (unknown)  (unknown)  Nutritional  (units    (un

known)



                    date)                         Appearance: unknown)  



                                                  average body           



                                                  habitus             

 

           (unknown)  (no       (unknown)  (unknown)  OB/External +  (units    (

unknown)



                    date)                         Speculum: unknown)  



                                                  cervical os open           

 

           (unknown)  (no       (unknown)  (unknown)  Opioids -  (units    (unkn

own)



                    date)                         Morphine  unknown)  



                                                  Analogues Allergy           



                                                  (Intermediate,           



                                                  Verified 22           



                                                  08:14)              

 

           (unknown)  (no       (unknown)  (unknown)  Orders    (units    (unkno

wn)



                    date)                                   unknown)  

 

           (unknown)  (no       (unknown)  (unknown)  Orders:   (units    (unkno

wn)



                    date)                                   unknown)  

 

           (unknown)  (no       (unknown)  (unknown)  Orientation:  (units    (u

nknown)



                    date)                         alert and unknown)  



                                                  oriented x3           

 

           (unknown)  (no       (unknown)  (unknown)  Other Menstrual  (units   

 (unknown)



                    date)                         Period: Other unknown)  

 

           (unknown)  (no       (unknown)  (unknown)  Ovarian cyst  (units    (u

nknown)



                    date)                                   unknown)  

 

           (unknown)  (no       (unknown)  (unknown)  PFSH      (units    (unkno

wn)



                    date)                                   unknown)  

 

           (unknown)  (no       (unknown)  (unknown)  Painful   (units    (unkno

wn)



                    date)                         menstrual periods unknown)  



                                                  ()             

 

           (unknown)  (no       (unknown)  (unknown)  Palpation: soft,  (units  

  (unknown)



                    date)                         no        unknown)  



                                                  hepatosplenomegal           



                                                  y, no masses and           



                                                  tender in the RLQ           

 

           (unknown)  (no       (unknown)  (unknown)  Patient:  (units    (unkno

wn)



                    date)                         Casper,Grace unknown)  



                                                  MR#: [id number]           

 

           (unknown)  (no       (unknown)  (unknown)  Pelvic and  (units    (unk

nown)



                    date)                         perineal pain unknown)  

 

           (unknown)  (no       (unknown)  (unknown)  Position Sitting  (units  

  (unknown)



                    date)                                   unknown)  

 

           (unknown)  (no       (unknown)  (unknown)  Problem-specific  (units  

  (unknown)



                    date)                         ROS positives unknown)  



                                                  included with the           



                                                  HPI                 

 

           (unknown)  (no       (unknown)  (unknown)  Psych     (units    (unkno

wn)



                    date)                                   unknown)  

 

           (unknown)  (no       (unknown)  (unknown)  Pt here for FU  (units    

(unknown)



                    date)                         pelvic pain. unknown)  



                                                  States she is           



                                                  still having pain           

 

           (unknown)  (no       (unknown)  (unknown)  ROS Narrative  (units    (

unknown)



                    date)                                   unknown)  

 

           (unknown)  (no       (unknown)  (unknown)  ROS Narrative:  (units    

(unknown)



                    date)                                   unknown)  

 

           (unknown)  (no       (unknown)  (unknown)  ROS       (units    (unkno

wn)



                    date)                                   unknown)  

 

           (unknown)  (no       (unknown)  (unknown)  Reason For Visit  (units  

  (unknown)



                    date)                                   unknown)  

 

           (unknown)  (no       (unknown)  (unknown)  Recto-Vaginal:  (units    

(unknown)



                    date)                         cul-de-sac unknown)  



                                                  tenderness and no           



                                                  cul-de-sac           



                                                  nodularlity           

 

           (unknown)  (no       (unknown)  (unknown)  Resp      (units    (unkno

wn)



                    date)                                   unknown)  

 

           (unknown)  (no       (unknown)  (unknown)  S/P ACL   (units    (unkno

wn)



                    date)                         reconstruction unknown)  



                                                  (-21)           

 

           (unknown)  (no       (unknown)  (unknown)  Sclera: sclerae  (units   

 (unknown)



                    date)                         normal    unknown)  

 

           (unknown)  (no       (unknown)  (unknown)  She is not noted  (units  

  (unknown)



                    date)                         any significant unknown)  



                                                  difference in her           



                                                  right lower           



                                                  quadrant/right           

 

           (unknown)  (no       (unknown)  (unknown)  Signed By:  (units    (unk

nown)



                    date)                                   unknown)  

 

           (unknown)  (no       (unknown)  (unknown)  Smoking Status:  (units   

 (unknown)



                    date)                         Never smoker unknown)  

 

           (unknown)  (no       (unknown)  (unknown)  Speculum Exam -  (units   

 (unknown)



                    date)                         Cervix: normal unknown)  



                                                  appearance of the           



                                                  cervix, cervical           



                                                  os open and           

 

           (unknown)  (no       (unknown)  (unknown)  Speculum Exam -  (units   

 (unknown)



                    date)                         Vagina: normal unknown)  



                                                  appearance of the           



                                                  vagina, abnormal           



                                                  vaginal             

 

           (unknown)  (no       (unknown)  (unknown)  Speculum Exam:  (units    

(unknown)



                    date)                         cervical os open unknown)  

 

           (unknown)  (no       (unknown)  (unknown)  Speech and  (units    (unk

nown)



                    date)                         Movement: speech unknown)  



                                                  and movement           



                                                  normal              

 

           (unknown)  (no       (unknown)  (unknown)  Surgical History  (units  

  (unknown)



                    date)                         (Reviewed unknown)  



                                                  22 @ 08:13           



                                                  by Alvarez Woodson RN)                 

 

           (unknown)  (no       (unknown)  (unknown)  TOA       (units    (unkno

wn)



                    date)                         (tubo-ovarian unknown)  



                                                  abscess)            



                                                  (-21)           

 

           (unknown)  (no       (unknown)  (unknown)  This note may  (units    (

unknown)



                    date)                         have been all or unknown)  



                                                  partially           



                                                  generated using           



                                                  voice recognition           

 

           (unknown)  (no       (unknown)  (unknown)  Thought Content:  (units  

  (unknown)



                    date)                         normal    unknown)  

 

           (unknown)  (no       (unknown)  (unknown)  Thought Process:  (units  

  (unknown)



                    date)                         normal    unknown)  

 

           (unknown)  (no       (unknown)  (unknown)  Tobacco +  (units    (unkn

own)



                    date)                         Substance Use unknown)  

 

           (unknown)  (no       (unknown)  (unknown)  Tobacco Status  (units    

(unknown)



                    date)                                   unknown)  

 

           (unknown)  (no       (unknown)  (unknown)  US pelvic  (units    (unkn

own)



                    date)                         complete 1 Week unknown)  



                                                  G89.29 - Other           



                                                  chronic pain,           



                                                  N70.11 - Chronic           

 

           (unknown)  (no       (unknown)  (unknown)  Urethra: normal  (units   

 (unknown)



                    date)                         appearance of the unknown)  



                                                  urethra and           



                                                  tender              

 

           (unknown)  (no       (unknown)  (unknown)  Visit Reasons:  (units    

(unknown)



                    date)                         F/U Chronic unknown)  



                                                  Pelvic Pain           

 

           (unknown)  (no       (unknown)  (unknown)  Vitals    (units    (unkno

wn)



                    date)                                   unknown)  

 

           (unknown)  (no       (unknown)  (unknown)  Weight 150 lb 1  (units   

 (unknown)



                    date)                         oz        unknown)  

 

           (unknown)  (no       (unknown)  (unknown)  adnexa),  (units    (unkno

wn)



                    date)                         cul-de-sac unknown)  



                                                  tenderness and No           



                                                  cul-de-sac           



                                                  nodularity           

 

           (unknown)  (no       (unknown)  (unknown)  after completing  (units  

  (unknown)



                    date)                         her antibiotics. unknown)  



                                                  Patient is           



                                                  sexually active           



                                                  but her partner           

 

           (unknown)  (no       (unknown)  (unknown)  as the cause of  (units   

 (unknown)



                    date)                         diseases  unknown)  



                                                  classified           



                                                  elsewhere, N76.0           



                                                  - Acute             



                                                  vaginitis, R10.2           

 

           (unknown)  (no       (unknown)  (unknown)  clindamycin HCl  (units   

 (unknown)



                    date)                         150 mg PO DAILY unknown)  



                                                  30 caps 0RF           

 

           (unknown)  (no       (unknown)  (unknown)  clindamycin HCl  (units   

 (unknown)



                    date)                         150 mg capsule unknown)  



                                                  150 mg PO DAILY           



                                                  #30 caps 22           



                                                  [Rx Confirmed           

 

           (unknown)  (no       (unknown)  (unknown)  discharge  (units    (unkn

own)



                    date)                         (Thick, white, + unknown)  



                                                  amine odor;           



                                                  AFFIRM              



                                                  submitted.) and           



                                                  no lesions           

 

           (unknown)  (no       (unknown)  (unknown)  doxycycline  (units    (un

known)



                    date)                         Adverse Reaction unknown)  



                                                  (Intermediate,           



                                                  Verified 22           



                                                  08:14)              

 

           (unknown)  (no       (unknown)  (unknown)  evaluation/treat  (units  

  (unknown)



                    date)                         ment of her unknown)  



                                                  RLQ/pelvic pain.           



                                                  In addition her           



                                                  bacterial           

 

           (unknown)  (no       (unknown)  (unknown)  have occurred.  (units    

(unknown)



                    date)                         If there are any unknown)  



                                                  questions, please           



                                                  contact the           



                                                  Medical Records           

 

           (unknown)  (no       (unknown)  (unknown)  lower quadrant  (units    

(unknown)



                    date)                         pain      unknown)  

 

           (unknown)  (no       (unknown)  (unknown)  may occur.  (units    (unk

nown)



                    date)                         Occasional unknown)  



                                                  wrong-word or           



                                                  'sound-alike'           



                                                  substitutions may           



                                                  have                

 

           (unknown)  (no       (unknown)  (unknown)  nontender  (units    (unkn

own)



                    date)                                   unknown)  

 

           (unknown)  (no       (unknown)  (unknown)  occurred due to  (units   

 (unknown)



                    date)                         the inherent unknown)  



                                                  limitations of           



                                                  voice recognition           



                                                  software. Please           

 

           (unknown)  (no       (unknown)  (unknown)  oxycodone 5 mg  (units    

(unknown)



                    date)                         tablet 5 mg PO unknown)  



                                                  Q6H PRN pain #10           



                                                  tabs 22 [Rx           



                                                  Confirmed           

 

           (unknown)  (no       (unknown)  (unknown) promethazine 25  (units    

(unknown)



                    date)                         mg tablet 25 mg unknown)  



                                                  PO Q6H PRN           



                                                  22 [History           



                                                  Confirmed           



                                                  22]           

 

           (unknown)  (no       (unknown)  (unknown)  rash      (units    (unkno

wn)



                    date)                                   unknown)  

 

           (unknown)  (no       (unknown)  (unknown)  read the note  (units    (

unknown)



                    date)                         carefully and unknown)  



                                                  recognize, using           



                                                  context, where           



                                                  these               



                                                  substitutions           

 

           (unknown)  (no       (unknown)  (unknown)  salpingitis,  (units    (u

nknown)



                    date)                         N70.93 -  unknown)  



                                                  Salpingitis and           



                                                  oophoritis,           



                                                  unspecified,           



                                                  R10.31 - Right           

 

           (unknown)  (no       (unknown)  (unknown)  sentences  (units    (unkn

own)



                    date)                                   unknown)  

 

           (unknown)  (no       (unknown)  (unknown)  sided pelvic  (units    (u

nknown)



                    date)                         pain. Patient unknown)  



                                                  wishes to proceed           



                                                  with definitive           

 

           (unknown)  (no       (unknown)  (unknown)  software.  (units    (unkn

own)



                    date)                         Although every unknown)  



                                                  effort is made to           



                                                  edit content,           



                                                  transcription           



                                                  errors              

 

           (unknown)  (no       (unknown)  (unknown)  treated for BV.  (units   

 (unknown)



                    date)                                   unknown)  

 

           (unknown)  (no       (unknown)  (unknown)  urethra and  (units    (un

known)



                    date)                         tender    unknown)  

 

           (unknown)  (no       (unknown)  (unknown)  uses condoms  (units    (u

nknown)



                    date)                         with each sexual unknown)  



                                                  encounter.           



                                                  Patient's           



                                                  partners has           



                                                  never been           

 

           (unknown)  (no       (unknown)  (unknown)  vaginosis which  (units   

 (unknown)



                    date)                         initially unknown)  



                                                  improved with           



                                                  therapy has           



                                                  returned within a           



                                                  few days            

 

           (unknown)  (no       (unknown)  (unknown)  vomitt    (units    (unkno

wn)



                    date)                                   unknown)  









                                         Result panel 16









           (unknown)  (no       (unknown)  (unknown)  (no value)  (units    (unk

nown)



                    date)                                   unknown)  

 

           (unknown)  (no       (unknown)  (unknown)  (1) Bacterial  (units    (

unknown)



                    date)                         vaginosis: unknown)  

 

           (unknown)  (no       (unknown)  (unknown)  (2) Chronic  (units    (un

known)



                    date)                         salpingitis: unknown)  

 

           (unknown)  (no       (unknown)  (unknown)  (3) Chronic  (units    (un

known)



                    date)                         right lower unknown)  



                                                  quadrant pain:           

 

           (unknown)  (no       (unknown)  (unknown)  08:12     (units    (unkno

wn)



                    date)                                   unknown)  

 

           (unknown)  (no       (unknown)  (unknown)  22  (units    (unkno

wn)



                    date)                                   unknown)  

 

           (unknown)  (no       (unknown)  (unknown)  22]  (units    (unkn

own)



                    date)                                   unknown)  

 

           (unknown)  (no       (unknown)  (unknown)  [id number]    (units    (unkno

wn)



                    date)                                   unknown)  

 

           (unknown)  (no       (unknown)  (unknown)  Acne (-2016)  (units    (u

nknown)



                    date)                                   unknown)  

 

           (unknown)  (no       (unknown)  (unknown)  Affect: normal  (units    

(unknown)



                    date)                         affect    unknown)  

 

           (unknown)  (no       (unknown)  (unknown)  Affirm Vaginosis  (units  

  (unknown)



                    date)                         Pan Bacterial unknown)  



                                                  Today B96.89 -           



                                                  Other specified           



                                                  bacterial agents           

 

           (unknown)  (no       (unknown)  (unknown)  Age/Sex: 17 / F  (units   

 (unknown)



                    date)                         Date of Service: unknown)  

 

           (unknown)  (no       (unknown)  (unknown)  Allergies  (units    (unkn

own)



                    date)                                   unknown)  

 

           (unknown)  (no       (unknown)  (unknown)  [place]  (units    (

unknown)



                    date)                         [id number]     unknown)  

 

           (unknown)  (no       (unknown)  (unknown)  Anesthesia  (units    (unk

nown)



                    date)                                   unknown)  

 

           (unknown)  (no       (unknown)  (unknown)  Appearance:  (units    (un

known)



                    date)                         grossly normal unknown)  

 

           (unknown)  (no       (unknown)  (unknown)  Assessment +  (units    (u

nknown)



                    date)                         Plan      unknown)  

 

           (unknown)  (no       (unknown)  (unknown)  Attending Dr:  (units    (

unknown)



                    date)                         Jose Almodovar unknown)  



                                                  MD                  

 

           (unknown)  (no       (unknown)  (unknown)  Attitude:  (units    (unkn

own)



                    date)                         cooperative unknown)  

 

           (unknown)  (no       (unknown)  (unknown)  BMI 26.2  (units    (unkno

wn)



                    date)                                   unknown)  

 

           (unknown)  (no       (unknown)  (unknown)  /70  (units    (unkn

own)



                    date)                                   unknown)  

 

           (unknown)  (no       (unknown)  (unknown) Bimanual Exam-  (units    (

unknown)



                    date)                         Adnexa, other: unknown)  



                                                  tender (R>>L),           



                                                  mass (Very tender           



                                                  fullness, right           

 

           (unknown)  (no       (unknown)  (unknown)  Bimanual Exam-  (units    

(unknown)



                    date)                         Vagina + Uterus: unknown)  



                                                  No tender           

 

           (unknown)  (no       (unknown)  (unknown)  Blood Pressure  (units    

(unknown)



                    date)                         Location Rt unknown)  



                                                  brachial            

 

           (unknown)  (no       (unknown)  (unknown)  Chief Complaint  (units   

 (unknown)



                    date)                                   unknown)  

 

           (unknown)  (no       (unknown)  (unknown)  Chief Complaint:  (units  

  (unknown)



                    date)                         Right lower unknown)  



                                                  quadrant pain,           



                                                  recurrent           



                                                  bacterial           



                                                  vaginosis           

 

           (unknown)  (no       (unknown)  (unknown)  Chlamydia  (units    (unkn

own)



                    date)                         infection () unknown)  

 

           (unknown)  (no       (unknown)  (unknown)  Conjunctivae:  (units    (

unknown)



                    date)                         conjunctivae unknown)  



                                                  normal              

 

           (unknown)  (no       (unknown)  (unknown)  Const     (units    (unkno

wn)



                    date)                                   unknown)  

 

           (unknown)  (no       (unknown)  (unknown)  : 2005  (units   

 (unknown)



                    date)                         Acct:[id number] unknown)  

 

           (unknown)  (no       (unknown)  (unknown)  Dept at   (units    (unkno

wn)



                    date)                         [phone]. unknown)  

 

           (unknown)  (no       (unknown)  (unknown)  Details:  (units    (unkno

wn)



                    date)                                   unknown)  

 

           (unknown)  (no       (unknown)  (unknown)  Documented By:  (units    

(unknown)



                    date)                         Jose Almodovar unknown)  



                                                  MD 22 0810           

 

           (unknown)  (no       (unknown)  (unknown)  [employer]     (units    (unkno

wn)



                    date)                                   unknown)  

 

           (unknown)  (no       (unknown)  (unknown) Due to the  (units    (unkn

own)



                    date)                         patient's unknown)  



                                                  persistent pain,           



                                                  she wants to move           



                                                  forward with           



                                                  laparoscopy           

 

           (unknown)  (no       (unknown)  (unknown)  EOM: EOM intact  (units   

 (unknown)



                    date)                         bilaterally unknown)  

 

           (unknown)  (no       (unknown)  (unknown)  Ears: hearing  (units    (

unknown)



                    date)                         grossly normal unknown)  



                                                  bilaterally           

 

           (unknown)  (no       (unknown)  (unknown)  Effort +  (units    (unkno

wn)



                    date)                         Inspection: unknown)  



                                                  normal              



                                                  respiratory           



                                                  effort and able           



                                                  to speak in           



                                                  complete            

 

           (unknown)  (no       (unknown)  (unknown)  Endometriosis  (units    (

unknown)



                    date)                         (-)   unknown)  

 

           (unknown)  (no       (unknown)  (unknown)  Exam      (units    (unkno

wn)



                    date)                                   unknown)  

 

           (unknown)  (no       (unknown)  (unknown)  External Female  (units   

 (unknown)



                    date)                         Exam: normal unknown)  



                                                  external            



                                                  appearance,           



                                                  normal appearance           



                                                  of the              

 

           (unknown)  (no       (unknown)  (unknown)  Eyes      (units    (unkno

wn)



                    date)                                   unknown)  

 

           (unknown)  (no       (unknown)  (unknown)  Face and sinus:  (units   

 (unknown)



                    date)                         face symmetric unknown)  

 

           (unknown)  (no       (unknown)  (unknown)  [facility]  (units   

 (unknown)



                    date)                         [facility] unknown)  

 

           (unknown)  (no       (unknown)  (unknown)  GI        (units    (unkno

wn)



                    date)                                   unknown)  

 

           (unknown)  (no       (unknown)  (unknown)          (units    (unkno

wn)



                    date)                                   unknown)  

 

           (unknown)  (no       (unknown)  (unknown)  General:  (units    (unkno

wn)



                    date)                         appearance unknown)  



                                                  normal, both eyes           



                                                  and all related           



                                                  structures           

 

           (unknown)  (no       (unknown)  (unknown)  General:  (units    (unkno

wn)



                    date)                         cooperative, unknown)  



                                                  comfortable and           



                                                  no acute distress           

 

           (unknown)  (no       (unknown)  (unknown)  Kateryna returns  (units    (

unknown)



                    date)                         today after 2 unknown)  



                                                  week therapy with           



                                                  levofloxacin and           



                                                  metronidazole.           

 

           (unknown)  (no       (unknown)  (unknown)  Gynecology Visit  (units  

  (unknown)



                    date)                                   unknown)  

 

           (unknown)  (no       (unknown)  (unknown)  HENMT     (units    (unkno

wn)



                    date)                                   unknown)  

 

           (unknown)  (no       (unknown)  (unknown)  HPI       (units    (unkno

wn)



                    date)                                   unknown)  

 

           (unknown)  (no       (unknown)  (unknown)  Head: normal to  (units   

 (unknown)



                    date)                         inspection, unknown)  



                                                  normocephalic and           



                                                  atraumatic           

 

           (unknown)  (no       (unknown)  (unknown)  Heavy menstrual  (units   

 (unknown)



                    date)                         period (2019) unknown)  

 

           (unknown)  (no       (unknown)  (unknown)  Height 5 ft 3.5  (units   

 (unknown)



                    date)                         in        unknown)  

 

           (unknown)  (no       (unknown)  (unknown)  Inspection:  (units    (un

known)



                    date)                         normal to unknown)  



                                                  inspection           

 

           (unknown)  (no       (unknown)  (unknown)  Intake Note:  (units    (u

nknown)



                    date)                                   unknown)  

 

           (unknown)  (no       (unknown)  (unknown)  Intake performed  (units  

  (unknown)



                    date)                         by: Alvarez Woodson unknown)  

 

           (unknown)  (no       (unknown)  (unknown)  Intake    (units    (unkno

wn)



                    date)                                   unknown)  

 

           (unknown)  (no       (unknown)  (unknown)  Intake- Clincial  (units  

  (unknown)



                    date)                         Staff     unknown)  

 

           (unknown)  (no       (unknown)  (unknown)  Irregular  (units    (unkn

own)



                    date)                         menstrual cycle unknown)  



                                                  ()             

 

           (unknown)  (no       (unknown)  (unknown)  Is last   (units    (unkno

wn)



                    date)                         menstrual period unknown)  



                                                  known: No           

 

           (unknown)  (no       (unknown)  (unknown)  Judgment:  (units    (unkn

own)



                    date)                         judgment good unknown)  

 

           (unknown)  (no       (unknown)  (unknown)  Last Menstural  (units    

(unknown)



                    date)                         Cycle + Details unknown)  

 

           (unknown)  (no       (unknown)  (unknown)  Loc: [facility]  (units    (unkno

wn)



                    date)                                   unknown)  

 

           (unknown)  (no       (unknown)  (unknown)  Lymphangioma  (units    (u

nknown)



                    date)                                   unknown)  

 

           (unknown)  (no       (unknown)  (unknown)  Medical History  (units   

 (unknown)



                    date)                         (Reviewed unknown)  



                                                  22 @ 08:13           



                                                  by Alvarez Woodson RN)                 

 

           (unknown)  (no       (unknown)  (unknown)  Medications  (units    (un

known)



                    date)                                   unknown)  

 

           (unknown)  (no       (unknown)  (unknown)  Medications:  (units    (u

nknown)



                    date)                                   unknown)  

 

           (unknown)  (no       (unknown)  (unknown)  Mental Status:  (units    

(unknown)



                    date)                         mental status unknown)  



                                                  grossly normal           

 

           (unknown)  (no       (unknown)  (unknown)  Mood: congruent  (units   

 (unknown)



                    date)                         mood      unknown)  

 

           (unknown)  (no       (unknown)  (unknown)  Neck      (units    (unkno

wn)



                    date)                                   unknown)  

 

           (unknown)  (no       (unknown)  (unknown)  Neck: normal  (units    (u

nknown)



                    date)                         visual inspection unknown)  

 

           (unknown)  (no       (unknown)  (unknown)  New       (units    (unkno

wn)



                    date)                                   unknown)  

 

           (unknown)  (no       (unknown)  (unknown)  Nutritional  (units    (un

known)



                    date)                         Appearance: unknown)  



                                                  average body           



                                                  habitus             

 

           (unknown)  (no       (unknown)  (unknown)  OB/External +  (units    (

unknown)



                    date)                         Speculum: unknown)  



                                                  cervical os open           

 

           (unknown)  (no       (unknown)  (unknown)  Opioids -  (units    (unkn

own)



                    date)                         Morphine  unknown)  



                                                  Analogues Allergy           



                                                  (Intermediate,           



                                                  Verified 22           



                                                  08:14)              

 

           (unknown)  (no       (unknown)  (unknown)  Orders    (units    (unkno

wn)



                    date)                                   unknown)  

 

           (unknown)  (no       (unknown)  (unknown)  Orders:   (units    (unkno

wn)



                    date)                                   unknown)  

 

           (unknown)  (no       (unknown)  (unknown)  Orientation:  (units    (u

nknown)



                    date)                         alert and unknown)  



                                                  oriented x3           

 

           (unknown)  (no       (unknown)  (unknown)  Other Menstrual  (units   

 (unknown)



                    date)                         Period: Other unknown)  

 

           (unknown)  (no       (unknown)  (unknown)  Ovarian cyst  (units    (u

nknown)



                    date)                                   unknown)  

 

           (unknown)  (no       (unknown)  (unknown)  PFSH      (units    (unkno

wn)



                    date)                                   unknown)  

 

           (unknown)  (no       (unknown)  (unknown)  Painful   (units    (unkno

wn)



                    date)                         menstrual periods unknown)  



                                                  ()             

 

           (unknown)  (no       (unknown)  (unknown)  Palpation: soft,  (units  

  (unknown)



                    date)                         no        unknown)  



                                                  hepatosplenomegal           



                                                  y, no masses and           



                                                  tender in the RLQ           

 

           (unknown)  (no       (unknown)  (unknown)  Patient:  (units    (unkno

wn)



                    date)                         Kateryna Jenkins unknown)  



                                                  MR#: [id number]           

 

           (unknown)  (no       (unknown)  (unknown)  Pelvic and  (units    (unk

nown)



                    date)                         perineal pain unknown)  

 

           (unknown)  (no       (unknown)  (unknown)  Plan      (units    (unkno

wn)



                    date)                                   unknown)  

 

           (unknown)  (no       (unknown)  (unknown)  Position Sitting  (units  

  (unknown)



                    date)                                   unknown)  

 

           (unknown)  (no       (unknown)  (unknown)  Problem-specific  (units  

  (unknown)



                    date)                         ROS positives unknown)  



                                                  included with the           



                                                  HPI                 

 

           (unknown)  (no       (unknown)  (unknown)  Psych     (units    (unkno

wn)



                    date)                                   unknown)  

 

           (unknown)  (no       (unknown)  (unknown)  Pt here for FU  (units    

(unknown)



                    date)                         pelvic pain. unknown)  



                                                  States she is           



                                                  still having pain           

 

           (unknown)  (no       (unknown)  (unknown)  ROS Narrative  (units    (

unknown)



                    date)                                   unknown)  

 

           (unknown)  (no       (unknown)  (unknown)  ROS Narrative:  (units    

(unknown)



                    date)                                   unknown)  

 

           (unknown)  (no       (unknown)  (unknown)  ROS       (units    (unkno

wn)



                    date)                                   unknown)  

 

           (unknown)  (no       (unknown)  (unknown)  Reason For Visit  (units  

  (unknown)



                    date)                                   unknown)  

 

           (unknown)  (no       (unknown)  (unknown)  Recto-Vaginal:  (units    

(unknown)



                    date)                         cul-de-sac unknown)  



                                                  tenderness and no           



                                                  cul-de-sac           



                                                  nodularlity           

 

           (unknown)  (no       (unknown)  (unknown)  Resp      (units    (unkno

wn)



                    date)                                   unknown)  

 

           (unknown)  (no       (unknown)  (unknown)  S/P ACL   (units    (unkno

wn)



                    date)                         reconstruction unknown)  



                                                  (-21)           

 

           (unknown)  (no       (unknown)  (unknown)  Sclera: sclerae  (units   

 (unknown)



                    date)                         normal    unknown)  

 

           (unknown)  (no       (unknown)  (unknown)  She is not noted  (units  

  (unknown)



                    date)                         any significant unknown)  



                                                  difference in her           



                                                  right lower           



                                                  quadrant/right           

 

           (unknown)  (no       (unknown)  (unknown)  Signed By:  (units    (unk

nown)



                    date)                                   unknown)  

 

           (unknown)  (no       (unknown)  (unknown)  Smoking Status:  (units   

 (unknown)



                    date)                         Never smoker unknown)  

 

           (unknown)  (no       (unknown)  (unknown)  Speculum Exam -  (units   

 (unknown)



                    date)                         Cervix: normal unknown)  



                                                  appearance of the           



                                                  cervix, cervical           



                                                  os open and           

 

           (unknown)  (no       (unknown)  (unknown)  Speculum Exam -  (units   

 (unknown)



                    date)                         Vagina: normal unknown)  



                                                  appearance of the           



                                                  vagina, abnormal           



                                                  vaginal             

 

           (unknown)  (no       (unknown)  (unknown)  Speculum Exam:  (units    

(unknown)



                    date)                         cervical os open unknown)  

 

           (unknown)  (no       (unknown)  (unknown)  Speech and  (units    (unk

nown)



                    date)                         Movement: speech unknown)  



                                                  and movement           



                                                  normal              

 

           (unknown)  (no       (unknown)  (unknown)  Status: Acute  (units    (

unknown)



                    date)                                   unknown)  

 

           (unknown)  (no       (unknown)  (unknown)  Surgical History  (units  

  (unknown)



                    date)                         (Reviewed unknown)  



                                                  22 @ 08:13           



                                                  by Alvarez Woodson RN)                 

 

           (unknown)  (no       (unknown)  (unknown)  TOA       (units    (unkno

wn)



                    date)                         (tubo-ovarian unknown)  



                                                  abscess)            



                                                  (-21)           

 

           (unknown)  (no       (unknown)  (unknown)  The patient's  (units    (

unknown)



                    date)                         recurrent unknown)  



                                                  bacterial           



                                                  vaginosis           

 

           (unknown)  (no       (unknown)  (unknown)  The patient's  (units    (

unknown)



                    date)                         right lower unknown)  



                                                  quadrant/right           



                                                  hemipelvis pain           



                                                  is unchanged           



                                                  after 2             

 

           (unknown)  (no       (unknown)  (unknown)  This note may  (units    (

unknown)



                    date)                         have been all or unknown)  



                                                  partially           



                                                  generated using           



                                                  voice recognition           

 

           (unknown)  (no       (unknown)  (unknown)  Thought Content:  (units  

  (unknown)



                    date)                         normal    unknown)  

 

           (unknown)  (no       (unknown)  (unknown)  Thought Process:  (units  

  (unknown)



                    date)                         normal    unknown)  

 

           (unknown)  (no       (unknown)  (unknown)  Tobacco +  (units    (unkn

own)



                    date)                         Substance Use unknown)  

 

           (unknown)  (no       (unknown)  (unknown)  Tobacco Status  (units    

(unknown)



                    date)                                   unknown)  

 

           (unknown)  (no       (unknown)  (unknown)  US pelvic  (units    (unkn

own)



                    date)                         complete 1 Week unknown)  



                                                  G89.29 - Other           



                                                  chronic pain,           



                                                  N70.11 - Chronic           

 

           (unknown)  (no       (unknown)  (unknown)  Urethra: normal  (units   

 (unknown)



                    date)                         appearance of the unknown)  



                                                  urethra and           



                                                  tender              

 

           (unknown)  (no       (unknown)  (unknown)  Visit Reasons:  (units    

(unknown)



                    date)                         F/U Chronic unknown)  



                                                  Pelvic Pain           

 

           (unknown)  (no       (unknown)  (unknown)  Vitals    (units    (unkno

wn)



                    date)                                   unknown)  

 

           (unknown)  (no       (unknown)  (unknown)  Weight 150 lb 1  (units   

 (unknown)



                    date)                         oz        unknown)  

 

           (unknown)  (no       (unknown)  (unknown) abnormal, RSO may  (units  

  (unknown)



                    date)                         be necessary to unknown)  



                                                  give her the best           



                                                  possible chance           



                                                  of resolving           

 

           (unknown)  (no       (unknown)  (unknown)  adnexa),  (units    (unkno

wn)



                    date)                         cul-de-sac unknown)  



                                                  tenderness and No           



                                                  cul-de-sac           



                                                  nodularity           

 

           (unknown)  (no       (unknown)  (unknown)  after completing  (units  

  (unknown)



                    date)                         her antibiotics. unknown)  



                                                  Patient is           



                                                  sexually active           



                                                  but her partner           

 

           (unknown)  (no       (unknown)  (unknown)  also made aware  (units   

 (unknown)



                    date)                         that no assurance unknown)  



                                                  can be made that           



                                                  her pain will be           



                                                  resolved            

 

           (unknown)  (no       (unknown)  (unknown)  also understands  (units  

  (unknown)



                    date)                         that if tube and unknown)  



                                                  ovary are           



                                                  severely scarred           



                                                  or otherwise           

 

           (unknown)  (no       (unknown)  (unknown)  and possible  (units    (u

nknown)



                    date)                         right     unknown)  



                                                  salpingo-oophorec           



                                                  macey to further           



                                                  evaluate and           



                                                  treat her RLQ,           

 

           (unknown)  (no       (unknown)  (unknown)  as the cause of  (units   

 (unknown)



                    date)                         diseases  unknown)  



                                                  classified           



                                                  elsewhere, N76.0           



                                                  - Acute             



                                                  vaginitis, R10.2           

 

           (unknown)  (no       (unknown)  (unknown)  clindamycin HCl  (units   

 (unknown)



                    date)                         150 mg PO DAILY unknown)  



                                                  30 caps 0RF           

 

           (unknown)  (no       (unknown)  (unknown)  clindamycin HCl  (units   

 (unknown)



                    date)                         150 mg capsule unknown)  



                                                  150 mg PO DAILY           



                                                  #30 caps 22           



                                                  [Rx Confirmed           

 

           (unknown)  (no       (unknown)  (unknown)  discharge  (units    (unkn

own)



                    date)                         (Thick, white, + unknown)  



                                                  amine odor;           



                                                  AFFIRM              



                                                  submitted.) and           



                                                  no lesions           

 

           (unknown)  (no       (unknown)  (unknown)  doxycycline  (units    (un

known)



                    date)                         Adverse Reaction unknown)  



                                                  (Intermediate,           



                                                  Verified 22           



                                                  08:14)              

 

           (unknown)  (no       (unknown)  (unknown)  evaluation/treat  (units  

  (unknown)



                    date)                         ment of her unknown)  



                                                  RLQ/pelvic pain.           



                                                  In addition her           



                                                  bacterial           

 

           (unknown)  (no       (unknown)  (unknown)  have occurred.  (units    

(unknown)



                    date)                         If there are any unknown)  



                                                  questions, please           



                                                  contact the           



                                                  Medical Records           

 

           (unknown)  (no       (unknown)  (unknown)  her chronic  (units    (un

known)



                    date)                         right lower unknown)  



                                                  quadrant pain           



                                                  following her           



                                                  TOA. The results           



                                                  of the              

 

           (unknown)  (no       (unknown)  (unknown)  infection which  (units   

 (unknown)



                    date)                         she apparently unknown)  



                                                  had.                

 

           (unknown)  (no       (unknown)  (unknown)  lower quadrant  (units    

(unknown)



                    date)                         pain      unknown)  

 

           (unknown)  (no       (unknown)  (unknown)  may occur.  (units    (unk

nown)



                    date)                         Occasional unknown)  



                                                  wrong-word or           



                                                  'sound-alike'           



                                                  substitutions may           



                                                  have                

 

           (unknown)  (no       (unknown)  (unknown)  nontender  (units    (unkn

own)



                    date)                                   unknown)  

 

           (unknown)  (no       (unknown)  (unknown)  occurred due to  (units   

 (unknown)



                    date)                         the inherent unknown)  



                                                  limitations of           



                                                  voice recognition           



                                                  software. Please           

 

           (unknown)  (no       (unknown)  (unknown)  ordered pelvic  (units    

(unknown)



                    date)                         ultrasound will unknown)  



                                                  also have a           



                                                  bearing on           



                                                  surgical plan and           



                                                  patient             

 

           (unknown)  (no       (unknown)  (unknown)  oxycodone 5 mg  (units    

(unknown)



                    date)                         tablet 5 mg PO unknown)  



                                                  Q6H PRN pain #10           



                                                  tabs 22 [Rx           



                                                  Confirmed           

 

           (unknown)  (no       (unknown)  (unknown)  post-TOA pain.  (units    

(unknown)



                    date)                         It is hoped that unknown)  



                                                  her ovary and           



                                                  tube can be           



                                                  preserved but she           

 

           (unknown)  (no       (unknown)  (unknown) promethazine 25  (units    

(unknown)



                    date)                         mg tablet 25 mg unknown)  



                                                  PO Q6H PRN           



                                                  22 [History           



                                                  Confirmed           



                                                  22]           

 

           (unknown)  (no       (unknown)  (unknown)  rash      (units    (unkno

wn)



                    date)                                   unknown)  

 

           (unknown)  (no       (unknown)  (unknown)  read the note  (units    (

unknown)



                    date)                         carefully and unknown)  



                                                  recognize, using           



                                                  context, where           



                                                  these               



                                                  substitutions           

 

           (unknown)  (no       (unknown)  (unknown)  residual mass or  (units  

  (unknown)



                    date)                         post inflammatory unknown)  



                                                  phlegmon            



                                                  involving the           



                                                  right adnexa.           



                                                  Pelvic              

 

           (unknown)  (no       (unknown)  (unknown)  salpingitis,  (units    (u

nknown)



                    date)                         N70.93 -  unknown)  



                                                  Salpingitis and           



                                                  oophoritis,           



                                                  unspecified,           



                                                  R10.31 - Right           

 

           (unknown)  (no       (unknown)  (unknown)  sentences  (units    (unkn

own)



                    date)                                   unknown)  

 

           (unknown)  (no       (unknown)  (unknown)  sided pelvic  (units    (u

nknown)



                    date)                         pain. Patient unknown)  



                                                  wishes to proceed           



                                                  with definitive           

 

           (unknown)  (no       (unknown)  (unknown)  software.  (units    (unkn

own)



                    date)                         Although every unknown)  



                                                  effort is made to           



                                                  edit content,           



                                                  transcription           



                                                  errors              

 

           (unknown)  (no       (unknown)  (unknown)  treated for BV.  (units   

 (unknown)



                    date)                                   unknown)  

 

           (unknown)  (no       (unknown)  (unknown)  ultrasound  (units    (unk

nown)



                    date)                         ordered and will unknown)  



                                                  contact the           



                                                  patient with           



                                                  results when           



                                                  available.           

 

           (unknown)  (no       (unknown)  (unknown)  urethra and  (units    (un

known)



                    date)                         tender    unknown)  

 

           (unknown)  (no       (unknown)  (unknown)  uses condoms  (units    (u

nknown)



                    date)                         with each sexual unknown)  



                                                  encounter.           



                                                  Patient's           



                                                  partners has           



                                                  never been           

 

           (unknown)  (no       (unknown)  (unknown)  vaginosis which  (units   

 (unknown)



                    date)                         initially unknown)  



                                                  improved with           



                                                  therapy has           



                                                  returned within a           



                                                  few days            

 

           (unknown)  (no       (unknown)  (unknown)  vomitt    (units    (unkno

wn)



                    date)                                   unknown)  

 

           (unknown)  (no       (unknown)  (unknown)  weeks of  (units    (unkno

wn)



                    date)                         antibiotic unknown)  



                                                  therapy and her           



                                                  pelvic exam today           



                                                  suggests there           



                                                  may be              

 

           (unknown)  (no       (unknown)  (unknown)  with RSO or  (units    (un

known)



                    date)                         other surgical unknown)  



                                                  procedures,           



                                                  especially           



                                                  following a           



                                                  severe pelvic           









                                         Result panel 17









           (unknown)  (no       (unknown)  (unknown)  (no value)  (units    (unk

nown)



                    date)                                   unknown)  

 

           (unknown)  (no       (unknown)  (unknown)  (1) Bacterial  (units    (

unknown)



                    date)                         vaginosis: unknown)  

 

           (unknown)  (no       (unknown)  (unknown)  (2) Chronic  (units    (un

known)



                    date)                         salpingitis: unknown)  

 

           (unknown)  (no       (unknown)  (unknown)  (3) Chronic  (units    (un

known)



                    date)                         right lower unknown)  



                                                  quadrant pain:           

 

           (unknown)  (no       (unknown)  (unknown)  08:12     (units    (unkno

wn)



                    date)                                   unknown)  

 

           (unknown)  (no       (unknown)  (unknown)  22  (units    (unkno

wn)



                    date)                                   unknown)  

 

           (unknown)  (no       (unknown)  (unknown)  22]  (units    (unkn

own)



                    date)                                   unknown)  

 

           (unknown)  (no       (unknown)  (unknown)  002562    (units    (unkno

wn)



                    date)                                   unknown)  

 

           (unknown)  (no       (unknown)  (unknown)  Acne (-2016)  (units    (u

nknown)



                    date)                                   unknown)  

 

           (unknown)  (no       (unknown)  (unknown)  Affect: normal  (units    

(unknown)



                    date)                         affect    unknown)  

 

           (unknown)  (no       (unknown)  (unknown)  Affirm Vaginosis  (units  

  (unknown)



                    date)                         Pan Bacterial unknown)  



                                                  Today B96.89 -           



                                                  Other specified           



                                                  bacterial agents           

 

           (unknown)  (no       (unknown)  (unknown)  Age/Sex: 17 / F  (units   

 (unknown)



                    date)                         Date of Service: unknown)  

 

           (unknown)  (no       (unknown)  (unknown)  Allergies  (units    (unkn

own)



                    date)                                   unknown)  

 

           (unknown)  (no       (unknown)  (unknown)  [place]  (units    (

unknown)



                    date)                         [address]     unknown)  

 

           (unknown)  (no       (unknown)  (unknown)  Anesthesia  (units    (unk

nown)



                    date)                                   unknown)  

 

           (unknown)  (no       (unknown)  (unknown)  Appearance:  (units    (un

known)



                    date)                         grossly normal unknown)  

 

           (unknown)  (no       (unknown)  (unknown)  Assessment +  (units    (u

nknown)



                    date)                         Plan      unknown)  

 

           (unknown)  (no       (unknown)  (unknown)  Attending Dr:  (units    (

unknown)



                    date)                         Jose Almodovar unknown)  



                                                  MD                  

 

           (unknown)  (no       (unknown)  (unknown)  Attitude:  (units    (unkn

own)



                    date)                         cooperative unknown)  

 

           (unknown)  (no       (unknown)  (unknown)  BMI 26.2  (units    (unkno

wn)



                    date)                                   unknown)  

 

           (unknown)  (no       (unknown)  (unknown)  /70  (units    (unkn

own)



                    date)                                   unknown)  

 

           (unknown)  (no       (unknown)  (unknown) Bimanual Exam-  (units    (

unknown)



                    date)                         Adnexa, other: unknown)  



                                                  tender (R>>L),           



                                                  mass (Very tender           



                                                  fullness, right           

 

           (unknown)  (no       (unknown)  (unknown)  Bimanual Exam-  (units    

(unknown)



                    date)                         Vagina + Uterus: unknown)  



                                                  No tender           

 

           (unknown)  (no       (unknown)  (unknown)  Blood Pressure  (units    

(unknown)



                    date)                         Location Rt unknown)  



                                                  brachial            

 

           (unknown)  (no       (unknown)  (unknown)  Chief Complaint  (units   

 (unknown)



                    date)                                   unknown)  

 

           (unknown)  (no       (unknown)  (unknown)  Chief Complaint:  (units  

  (unknown)



                    date)                         Right lower unknown)  



                                                  quadrant pain,           



                                                  recurrent           



                                                  bacterial           



                                                  vaginosis           

 

           (unknown)  (no       (unknown)  (unknown)  Chlamydia  (units    (unkn

own)



                    date)                         infection () unknown)  

 

           (unknown)  (no       (unknown)  (unknown)  Conjunctivae:  (units    (

unknown)



                    date)                         conjunctivae unknown)  



                                                  normal              

 

           (unknown)  (no       (unknown)  (unknown)  Const     (units    (unkno

wn)



                    date)                                   unknown)  

 

           (unknown)  (no       (unknown)  (unknown)  : 2005  (units   

 (unknown)



                    date)                         Acct:[id number] unknown)  

 

           (unknown)  (no       (unknown)  (unknown)  Dept at   (units    (unkno

wn)



                    date)                         [phone] unknown)  

 

           (unknown)  (no       (unknown)  (unknown)  Details:  (units    (unkno

wn)



                    date)                                   unknown)  

 

           (unknown)  (no       (unknown)  (unknown)  Documented By:  (units    

(unknown)



                    date)                         Jose Almodovar unknown)  



                                                  MD 22 0810           

 

           (unknown)  (no       (unknown)  (unknown)  D[employer]     (units    (unkno

wn)



                    date)                                   unknown)  

 

           (unknown)  (no       (unknown)  (unknown) Due to the  (units    (unkn

own)



                    date)                         patient's unknown)  



                                                  persistent pain,           



                                                  she wants to move           



                                                  forward with           



                                                  laparoscopy           

 

           (unknown)  (no       (unknown)  (unknown)  EOM: EOM intact  (units   

 (unknown)



                    date)                         bilaterally unknown)  

 

           (unknown)  (no       (unknown)  (unknown)  Ears: hearing  (units    (

unknown)



                    date)                         grossly normal unknown)  



                                                  bilaterally           

 

           (unknown)  (no       (unknown)  (unknown)  Effort +  (units    (unkno

wn)



                    date)                         Inspection: unknown)  



                                                  normal              



                                                  respiratory           



                                                  effort and able           



                                                  to speak in           



                                                  complete            

 

           (unknown)  (no       (unknown)  (unknown)  Endometriosis  (units    (

unknown)



                    date)                         ()   unknown)  

 

           (unknown)  (no       (unknown)  (unknown)  Exam      (units    (unkno

wn)



                    date)                                   unknown)  

 

           (unknown)  (no       (unknown)  (unknown)  External Female  (units   

 (unknown)



                    date)                         Exam: normal unknown)  



                                                  external            



                                                  appearance,           



                                                  normal appearance           



                                                  of the              

 

           (unknown)  (no       (unknown)  (unknown)  Eyes      (units    (unkno

wn)



                    date)                                   unknown)  

 

           (unknown)  (no       (unknown)  (unknown)  Face and sinus:  (units   

 (unknown)



                    date)                         face symmetric unknown)  

 

           (unknown)  (no       (unknown)  (unknown)  [facility]  (units   

 (unknown)



                    date)                         [facility] unknown)  

 

           (unknown)  (no       (unknown)  (unknown)  GI        (units    (unkno

wn)



                    date)                                   unknown)  

 

           (unknown)  (no       (unknown)  (unknown)          (units    (unkno

wn)



                    date)                                   unknown)  

 

           (unknown)  (no       (unknown)  (unknown)  General:  (units    (unkno

wn)



                    date)                         appearance unknown)  



                                                  normal, both eyes           



                                                  and all related           



                                                  structures           

 

           (unknown)  (no       (unknown)  (unknown)  General:  (units    (unkno

wn)



                    date)                         cooperative, unknown)  



                                                  comfortable and           



                                                  no acute distress           

 

           (unknown)  (no       (unknown)  (unknown)  Kateryna returns  (units    (

unknown)



                    date)                         today after 2 unknown)  



                                                  week therapy with           



                                                  levofloxacin and           



                                                  metronidazole.           

 

           (unknown)  (no       (unknown)  (unknown)  Gynecology Visit  (units  

  (unknown)



                    date)                                   unknown)  

 

           (unknown)  (no       (unknown)  (unknown)  HENMT     (units    (unkno

wn)



                    date)                                   unknown)  

 

           (unknown)  (no       (unknown)  (unknown)  HPI       (units    (unkno

wn)



                    date)                                   unknown)  

 

           (unknown)  (no       (unknown)  (unknown)  Head: normal to  (units   

 (unknown)



                    date)                         inspection, unknown)  



                                                  normocephalic and           



                                                  atraumatic           

 

           (unknown)  (no       (unknown)  (unknown)  Heavy menstrual  (units   

 (unknown)



                    date)                         period (-2019) unknown)  

 

           (unknown)  (no       (unknown)  (unknown)  Height 5 ft 3.5  (units   

 (unknown)



                    date)                         in        unknown)  

 

           (unknown)  (no       (unknown)  (unknown)  Inspection:  (units    (un

known)



                    date)                         normal to unknown)  



                                                  inspection           

 

           (unknown)  (no       (unknown)  (unknown)  Intake Note:  (units    (u

nknown)



                    date)                                   unknown)  

 

           (unknown)  (no       (unknown)  (unknown)  Intake performed  (units  

  (unknown)



                    date)                         by: Alvarez Woodson unknown)  

 

           (unknown)  (no       (unknown)  (unknown)  Intake    (units    (unkno

wn)



                    date)                                   unknown)  

 

           (unknown)  (no       (unknown)  (unknown)  Intake- Clincial  (units  

  (unknown)



                    date)                         Staff     unknown)  

 

           (unknown)  (no       (unknown)  (unknown)  Irregular  (units    (unkn

own)



                    date)                         menstrual cycle unknown)  



                                                  ()             

 

           (unknown)  (no       (unknown)  (unknown)  Is last   (units    (unkno

wn)



                    date)                         menstrual period unknown)  



                                                  known: No           

 

           (unknown)  (no       (unknown)  (unknown)  Judgment:  (units    (unkn

own)



                    date)                         judgment good unknown)  

 

           (unknown)  (no       (unknown)  (unknown)  Last Menstural  (units    

(unknown)



                    date)                         Cycle + Details unknown)  

 

           (unknown)  (no       (unknown)  (unknown)  Loc: [facility]  (units    (unkno

wn)



                    date)                                   unknown)  

 

           (unknown)  (no       (unknown)  (unknown)  Lymphangioma  (units    (u

nknown)



                    date)                                   unknown)  

 

           (unknown)  (no       (unknown)  (unknown)  Medical History  (units   

 (unknown)



                    date)                         (Reviewed unknown)  



                                                  22 @ 08:13           



                                                  by Alvarez Woodson RN)                 

 

           (unknown)  (no       (unknown)  (unknown)  Medications  (units    (un

known)



                    date)                                   unknown)  

 

           (unknown)  (no       (unknown)  (unknown)  Medications:  (units    (u

nknown)



                    date)                                   unknown)  

 

           (unknown)  (no       (unknown)  (unknown)  Mental Status:  (units    

(unknown)



                    date)                         mental status unknown)  



                                                  grossly normal           

 

           (unknown)  (no       (unknown)  (unknown)  Mood: congruent  (units   

 (unknown)



                    date)                         mood      unknown)  

 

           (unknown)  (no       (unknown)  (unknown)  Neck      (units    (unkno

wn)



                    date)                                   unknown)  

 

           (unknown)  (no       (unknown)  (unknown)  Neck: normal  (units    (u

nknown)



                    date)                         visual inspection unknown)  

 

           (unknown)  (no       (unknown)  (unknown)  New       (units    (unkno

wn)



                    date)                                   unknown)  

 

           (unknown)  (no       (unknown)  (unknown)  Nutritional  (units    (un

known)



                    date)                         Appearance: unknown)  



                                                  average body           



                                                  habitus             

 

           (unknown)  (no       (unknown)  (unknown)  OB/External +  (units    (

unknown)



                    date)                         Speculum: unknown)  



                                                  cervical os open           

 

           (unknown)  (no       (unknown)  (unknown)  Opioids -  (units    (unkn

own)



                    date)                         Morphine  unknown)  



                                                  Analogues Allergy           



                                                  (Intermediate,           



                                                  Verified 22           



                                                  08:14)              

 

           (unknown)  (no       (unknown)  (unknown)  Orders    (units    (unkno

wn)



                    date)                                   unknown)  

 

           (unknown)  (no       (unknown)  (unknown)  Orders:   (units    (unkno

wn)



                    date)                                   unknown)  

 

           (unknown)  (no       (unknown)  (unknown)  Orientation:  (units    (u

nknown)



                    date)                         alert and unknown)  



                                                  oriented x3           

 

           (unknown)  (no       (unknown)  (unknown)  Other Menstrual  (units   

 (unknown)



                    date)                         Period: Other unknown)  

 

           (unknown)  (no       (unknown)  (unknown)  Ovarian cyst  (units    (u

nknown)



                    date)                                   unknown)  

 

           (unknown)  (no       (unknown)  (unknown)  PFSH      (units    (unkno

wn)



                    date)                                   unknown)  

 

           (unknown)  (no       (unknown)  (unknown)  Painful   (units    (unkno

wn)



                    date)                         menstrual periods unknown)  



                                                  (-)             

 

           (unknown)  (no       (unknown)  (unknown)  Palpation: soft,  (units  

  (unknown)



                    date)                         no        unknown)  



                                                  hepatosplenomegal           



                                                  y, no masses and           



                                                  tender in the RLQ           

 

           (unknown)  (no       (unknown)  (unknown)  Patient:  (units    (unkno

wn)



                    date)                         Kateryna Jenkins unknown)  



                                                  MR#: [id number]           

 

           (unknown)  (no       (unknown)  (unknown)  Pelvic and  (units    (unk

nown)



                    date)                         perineal pain unknown)  

 

           (unknown)  (no       (unknown)  (unknown)  Plan      (units    (unkno

wn)



                    date)                                   unknown)  

 

           (unknown)  (no       (unknown)  (unknown)  Position Sitting  (units  

  (unknown)



                    date)                                   unknown)  

 

           (unknown)  (no       (unknown)  (unknown)  Problem-specific  (units  

  (unknown)



                    date)                         ROS positives unknown)  



                                                  included with the           



                                                  HPI                 

 

           (unknown)  (no       (unknown)  (unknown)  Psych     (units    (unkno

wn)



                    date)                                   unknown)  

 

           (unknown)  (no       (unknown)  (unknown)  Pt here for FU  (units    

(unknown)



                    date)                         pelvic pain. unknown)  



                                                  States she is           



                                                  still having pain           

 

           (unknown)  (no       (unknown)  (unknown)  ROS Narrative  (units    (

unknown)



                    date)                                   unknown)  

 

           (unknown)  (no       (unknown)  (unknown)  ROS Narrative:  (units    

(unknown)



                    date)                                   unknown)  

 

           (unknown)  (no       (unknown)  (unknown)  ROS       (units    (unkno

wn)



                    date)                                   unknown)  

 

           (unknown)  (no       (unknown)  (unknown)  Reason For Visit  (units  

  (unknown)



                    date)                                   unknown)  

 

           (unknown)  (no       (unknown)  (unknown)  Recto-Vaginal:  (units    

(unknown)



                    date)                         cul-de-sac unknown)  



                                                  tenderness and no           



                                                  cul-de-sac           



                                                  nodularlity           

 

           (unknown)  (no       (unknown)  (unknown)  Resp      (units    (unkno

wn)



                    date)                                   unknown)  

 

           (unknown)  (no       (unknown)  (unknown)  S/P ACL   (units    (unkno

wn)



                    date)                         reconstruction unknown)  



                                                  ()           

 

           (unknown)  (no       (unknown)  (unknown)  Sclera: sclerae  (units   

 (unknown)



                    date)                         normal    unknown)  

 

           (unknown)  (no       (unknown)  (unknown)  She is not noted  (units  

  (unknown)



                    date)                         any significant unknown)  



                                                  difference in her           



                                                  right lower           



                                                  quadrant/right           

 

           (unknown)  (no       (unknown)  (unknown)  Signed By:  (units    (unk

nown)



                    date)                                   unknown)  

 

           (unknown)  (no       (unknown)  (unknown)  Smoking Status:  (units   

 (unknown)



                    date)                         Never smoker unknown)  

 

           (unknown)  (no       (unknown)  (unknown)  Speculum Exam -  (units   

 (unknown)



                    date)                         Cervix: normal unknown)  



                                                  appearance of the           



                                                  cervix, cervical           



                                                  os open and           

 

           (unknown)  (no       (unknown)  (unknown)  Speculum Exam -  (units   

 (unknown)



                    date)                         Vagina: normal unknown)  



                                                  appearance of the           



                                                  vagina, abnormal           



                                                  vaginal             

 

           (unknown)  (no       (unknown)  (unknown)  Speculum Exam:  (units    

(unknown)



                    date)                         cervical os open unknown)  

 

           (unknown)  (no       (unknown)  (unknown)  Speech and  (units    (unk

nown)



                    date)                         Movement: speech unknown)  



                                                  and movement           



                                                  normal              

 

           (unknown)  (no       (unknown)  (unknown)  Status: Acute  (units    (

unknown)



                    date)                                   unknown)  

 

           (unknown)  (no       (unknown)  (unknown)  Surgical History  (units  

  (unknown)



                    date)                         (Reviewed unknown)  



                                                  22 @ 08:13           



                                                  by Alvarez Woodson RN)                 

 

           (unknown)  (no       (unknown)  (unknown)  TOA       (units    (unkno

wn)



                    date)                         (tubo-ovarian unknown)  



                                                  abscess)            



                                                  ()           

 

           (unknown)  (no       (unknown)  (unknown)  The patient's  (units    (

unknown)



                    date)                         recurrent unknown)  



                                                  bacterial           



                                                  vaginosis is also           



                                                  problematic for           



                                                  her and             

 

           (unknown)  (no       (unknown)  (unknown)  The patient's  (units    (

unknown)



                    date)                         right lower unknown)  



                                                  quadrant/right           



                                                  hemipelvis pain           



                                                  is unchanged           



                                                  after 2             

 

           (unknown)  (no       (unknown)  (unknown)  This note may  (units    (

unknown)



                    date)                         have been all or unknown)  



                                                  partially           



                                                  generated using           



                                                  voice recognition           

 

           (unknown)  (no       (unknown)  (unknown)  Thought Content:  (units  

  (unknown)



                    date)                         normal    unknown)  

 

           (unknown)  (no       (unknown)  (unknown)  Thought Process:  (units  

  (unknown)



                    date)                         normal    unknown)  

 

           (unknown)  (no       (unknown)  (unknown)  Tobacco +  (units    (unkn

own)



                    date)                         Substance Use unknown)  

 

           (unknown)  (no       (unknown)  (unknown)  Tobacco Status  (units    

(unknown)



                    date)                                   unknown)  

 

           (unknown)  (no       (unknown)  (unknown)  US pelvic  (units    (unkn

own)



                    date)                         complete 1 Week unknown)  



                                                  G89.29 - Other           



                                                  chronic pain,           



                                                  N70.11 - Chronic           

 

           (unknown)  (no       (unknown)  (unknown)  Urethra: normal  (units   

 (unknown)



                    date)                         appearance of the unknown)  



                                                  urethra and           



                                                  tender              

 

           (unknown)  (no       (unknown)  (unknown)  Visit Reasons:  (units    

(unknown)



                    date)                         F/U Chronic unknown)  



                                                  Pelvic Pain           

 

           (unknown)  (no       (unknown)  (unknown)  Vitals    (units    (unkno

wn)



                    date)                                   unknown)  

 

           (unknown)  (no       (unknown)  (unknown)  Weight 150 lb 1  (units   

 (unknown)



                    date)                         oz        unknown)  

 

           (unknown)  (no       (unknown)  (unknown) abnormal, RSO may  (units  

  (unknown)



                    date)                         be necessary to unknown)  



                                                  give her the best           



                                                  possible chance           



                                                  of resolving           

 

           (unknown)  (no       (unknown)  (unknown)  adnexa),  (units    (unkno

wn)



                    date)                         cul-de-sac unknown)  



                                                  tenderness and No           



                                                  cul-de-sac           



                                                  nodularity           

 

           (unknown)  (no       (unknown)  (unknown)  after completing  (units  

  (unknown)



                    date)                         her antibiotics. unknown)  



                                                  Patient is           



                                                  sexually active           



                                                  but her partner           

 

           (unknown)  (no       (unknown)  (unknown)  also made aware  (units   

 (unknown)



                    date)                         that no assurance unknown)  



                                                  can be made that           



                                                  her pain will be           



                                                  resolved            

 

           (unknown)  (no       (unknown)  (unknown)  also understands  (units  

  (unknown)



                    date)                         that if tube and unknown)  



                                                  ovary are           



                                                  severely scarred           



                                                  or otherwise           

 

           (unknown)  (no       (unknown)  (unknown)  and possible  (units    (u

nknown)



                    date)                         right     unknown)  



                                                  salpingo-oophorec           



                                                  macey to further           



                                                  evaluate and           



                                                  treat her RLQ,           

 

           (unknown)  (no       (unknown)  (unknown)  as the cause of  (units   

 (unknown)



                    date)                         diseases  unknown)  



                                                  classified           



                                                  elsewhere, N76.0           



                                                  - Acute             



                                                  vaginitis, R10.2           

 

           (unknown)  (no       (unknown)  (unknown)  clindamycin HCl  (units   

 (unknown)



                    date)                         150 mg PO DAILY unknown)  



                                                  30 caps 0RF           

 

           (unknown)  (no       (unknown)  (unknown)  clindamycin HCl  (units   

 (unknown)



                    date)                         150 mg capsule unknown)  



                                                  150 mg PO DAILY           



                                                  #30 caps 22           



                                                  [Rx Confirmed           

 

           (unknown)  (no       (unknown)  (unknown)  discharge  (units    (unkn

own)



                    date)                         (Thick, white, + unknown)  



                                                  amine odor;           



                                                  AFFIRM              



                                                  submitted.) and           



                                                  no lesions           

 

           (unknown)  (no       (unknown)  (unknown)  doxycycline  (units    (un

known)



                    date)                         Adverse Reaction unknown)  



                                                  (Intermediate,           



                                                  Verified 22           



                                                  08:14)              

 

           (unknown)  (no       (unknown)  (unknown)  evaluation/treat  (units  

  (unknown)



                    date)                         ment of her unknown)  



                                                  RLQ/pelvic pain.           



                                                  In addition her           



                                                  bacterial           

 

           (unknown)  (no       (unknown)  (unknown)  have occurred.  (units    

(unknown)



                    date)                         If there are any unknown)  



                                                  questions, please           



                                                  contact the           



                                                  Medical Records           

 

           (unknown)  (no       (unknown)  (unknown)  her chronic  (units    (un

known)



                    date)                         right lower unknown)  



                                                  quadrant pain           



                                                  following her           



                                                  TOA. The results           



                                                  of the              

 

           (unknown)  (no       (unknown)  (unknown)  infection which  (units   

 (unknown)



                    date)                         she apparently unknown)  



                                                  had.                

 

           (unknown)  (no       (unknown)  (unknown)  lower quadrant  (units    

(unknown)



                    date)                         pain      unknown)  

 

           (unknown)  (no       (unknown)  (unknown)  may occur.  (units    (unk

nown)



                    date)                         Occasional unknown)  



                                                  wrong-word or           



                                                  'sound-alike'           



                                                  substitutions may           



                                                  have                

 

           (unknown)  (no       (unknown)  (unknown)  nontender  (units    (unkn

own)



                    date)                                   unknown)  

 

           (unknown)  (no       (unknown)  (unknown)  occurred due to  (units   

 (unknown)



                    date)                         the inherent unknown)  



                                                  limitations of           



                                                  voice recognition           



                                                  software. Please           

 

           (unknown)  (no       (unknown)  (unknown)  ordered pelvic  (units    

(unknown)



                    date)                         ultrasound will unknown)  



                                                  also have a           



                                                  bearing on           



                                                  surgical plan and           



                                                  patient             

 

           (unknown)  (no       (unknown)  (unknown)  oxycodone 5 mg  (units    

(unknown)



                    date)                         tablet 5 mg PO unknown)  



                                                  Q6H PRN pain #10           



                                                  tabs 22 [Rx           



                                                  Confirmed           

 

           (unknown)  (no       (unknown)  (unknown)  post-TOA pain.  (units    

(unknown)



                    date)                         It is hoped that unknown)  



                                                  her ovary and           



                                                  tube can be           



                                                  preserved but she           

 

           (unknown)  (no       (unknown)  (unknown) promethazine 25  (units    

(unknown)



                    date)                         mg tablet 25 mg unknown)  



                                                  PO Q6H PRN           



                                                  22 [History           



                                                  Confirmed           



                                                  22]           

 

           (unknown)  (no       (unknown)  (unknown)  rash      (units    (unkno

wn)



                    date)                                   unknown)  

 

           (unknown)  (no       (unknown)  (unknown)  read the note  (units    (

unknown)



                    date)                         carefully and unknown)  



                                                  recognize, using           



                                                  context, where           



                                                  these               



                                                  substitutions           

 

           (unknown)  (no       (unknown)  (unknown)  residual mass or  (units  

  (unknown)



                    date)                         post inflammatory unknown)  



                                                  phlegmon            



                                                  involving the           



                                                  right adnexa.           



                                                  Pelvic              

 

           (unknown)  (no       (unknown)  (unknown)  salpingitis,  (units    (u

nknown)



                    date)                         N70.93 -  unknown)  



                                                  Salpingitis and           



                                                  oophoritis,           



                                                  unspecified,           



                                                  R10.31 - Right           

 

           (unknown)  (no       (unknown)  (unknown)  sentences  (units    (unkn

own)



                    date)                                   unknown)  

 

           (unknown)  (no       (unknown)  (unknown)  sided pelvic  (units    (u

nknown)



                    date)                         pain. Patient unknown)  



                                                  wishes to proceed           



                                                  with definitive           

 

           (unknown)  (no       (unknown)  (unknown)  software.  (units    (unkn

own)



                    date)                         Although every unknown)  



                                                  effort is made to           



                                                  edit content,           



                                                  transcription           



                                                  errors              

 

           (unknown)  (no       (unknown)  (unknown)  treated for BV.  (units   

 (unknown)



                    date)                                   unknown)  

 

           (unknown)  (no       (unknown)  (unknown)  ultrasound  (units    (unk

nown)



                    date)                         ordered and will unknown)  



                                                  contact the           



                                                  patient with           



                                                  results when           



                                                  available.           

 

           (unknown)  (no       (unknown)  (unknown)  urethra and  (units    (un

known)



                    date)                         tender    unknown)  

 

           (unknown)  (no       (unknown)  (unknown)  uses condoms  (units    (u

nknown)



                    date)                         with each sexual unknown)  



                                                  encounter.           



                                                  Patient's           



                                                  partners has           



                                                  never been           

 

           (unknown)  (no       (unknown)  (unknown)  vaginosis which  (units   

 (unknown)



                    date)                         initially unknown)  



                                                  improved with           



                                                  therapy has           



                                                  returned within a           



                                                  few days            

 

           (unknown)  (no       (unknown)  (unknown)  vomitt    (units    (unkno

wn)



                    date)                                   unknown)  

 

           (unknown)  (no       (unknown)  (unknown)  weeks of  (units    (unkno

wn)



                    date)                         antibiotic unknown)  



                                                  therapy and her           



                                                  pelvic exam today           



                                                  suggests there           



                                                  may be              

 

           (unknown)  (no       (unknown)  (unknown)  with RSO or  (units    (un

known)



                    date)                         other surgical unknown)  



                                                  procedures,           



                                                  especially           



                                                  following a           



                                                  severe pelvic           









                                         Result panel 18









           (unknown)  (no       (unknown)  (unknown)  (no value)  (units    (unk

nown)



                    date)                                   unknown)  

 

           (unknown)  (no       (unknown)  (unknown)  (1) Bacterial  (units    (

unknown)



                    date)                         vaginosis: unknown)  

 

           (unknown)  (no       (unknown)  (unknown)  (2) Chronic  (units    (un

known)



                    date)                         salpingitis: unknown)  

 

           (unknown)  (no       (unknown)  (unknown)  (3) Chronic  (units    (un

known)



                    date)                         right lower unknown)  



                                                  quadrant pain:           

 

           (unknown)  (no       (unknown)  (unknown)  08:12     (units    (unkno

wn)



                    date)                                   unknown)  

 

           (unknown)  (no       (unknown)  (unknown)  22 1313  (units    (

unknown)



                    date)                                   unknown)  

 

           (unknown)  (no       (unknown)  (unknown)  22  (units    (unkno

wn)



                    date)                                   unknown)  

 

           (unknown)  (no       (unknown)  (unknown)  22]  (units    (unkn

own)



                    date)                                   unknown)  

 

           (unknown)  (no       (unknown)  (unknown)  [id number]    (units    (unkno

wn)



                    date)                                   unknown)  

 

           (unknown)  (no       (unknown)  (unknown)  A copy of this  (units    

(unknown)



                    date)                         note will be unknown)  



                                                  forwarded to her           



                                                  primary care           



                                                  provider.           

 

           (unknown)  (no       (unknown)  (unknown)  Acne (-2016)  (units    (u

nknown)



                    date)                                   unknown)  

 

           (unknown)  (no       (unknown)  (unknown)  Affect: normal  (units    

(unknown)



                    date)                         affect    unknown)  

 

           (unknown)  (no       (unknown)  (unknown)  Affirm Vaginosis  (units  

  (unknown)



                    date)                         Pan Bacterial unknown)  



                                                  Today B96.89 -           



                                                  Other specified           



                                                  bacterial agents           

 

           (unknown)  (no       (unknown)  (unknown)  Age/Sex: 17 / F  (units   

 (unknown)



                    date)                         Date of Service: unknown)  

 

           (unknown)  (no       (unknown)  (unknown)  Allergies  (units    (unkn

own)



                    date)                                   unknown)  

 

           (unknown)  (no       (unknown)  (unknown)  [place]  (units    (

unknown)



                    date)                         [address]     unknown)  

 

           (unknown)  (no       (unknown)  (unknown)  Anesthesia  (units    (unk

nown)



                    date)                                   unknown)  

 

           (unknown)  (no       (unknown)  (unknown)  Appearance:  (units    (un

known)



                    date)                         grossly normal unknown)  

 

           (unknown)  (no       (unknown)  (unknown)  Assessment +  (units    (u

nknown)



                    date)                         Plan      unknown)  

 

           (unknown)  (no       (unknown)  (unknown)  Attending Dr:  (units    (

unknown)



                    date)                         Jose Almodovar unknown)  



                                                  MD                  

 

           (unknown)  (no       (unknown)  (unknown)  Attitude:  (units    (unkn

own)



                    date)                         cooperative unknown)  

 

           (unknown)  (no       (unknown)  (unknown)  BMI 26.2  (units    (unkno

wn)



                    date)                                   unknown)  

 

           (unknown)  (no       (unknown)  (unknown)  /70  (units    (unkn

own)



                    date)                                   unknown)  

 

           (unknown)  (no       (unknown)  (unknown) Bimanual Exam-  (units    (

unknown)



                    date)                         Adnexa, other: unknown)  



                                                  tender (R>>L),           



                                                  mass (Very tender           



                                                  fullness, right           

 

           (unknown)  (no       (unknown)  (unknown)  Bimanual Exam-  (units    

(unknown)



                    date)                         Vagina + Uterus: unknown)  



                                                  No tender           

 

           (unknown)  (no       (unknown)  (unknown)  Blood Pressure  (units    

(unknown)



                    date)                         Location Rt unknown)  



                                                  brachial            

 

           (unknown)  (no       (unknown)  (unknown)  Chief Complaint  (units   

 (unknown)



                    date)                                   unknown)  

 

           (unknown)  (no       (unknown)  (unknown)  Chief Complaint:  (units  

  (unknown)



                    date)                         Right lower unknown)  



                                                  quadrant pain,           



                                                  recurrent           



                                                  bacterial           



                                                  vaginosis           

 

           (unknown)  (no       (unknown)  (unknown)  Chlamydia  (units    (unkn

own)



                    date)                         infection () unknown)  

 

           (unknown)  (no       (unknown)  (unknown)  Conjunctivae:  (units    (

unknown)



                    date)                         conjunctivae unknown)  



                                                  normal              

 

           (unknown)  (no       (unknown)  (unknown)  Const     (units    (unkno

wn)



                    date)                                   unknown)  

 

           (unknown)  (no       (unknown)  (unknown)  Counseling and  (units    

(unknown)



                    date)                         educating the unknown)  



                                                  patient/family/ca           



                                                  regiver: 10           

 

           (unknown)  (no       (unknown)  (unknown)  : 2005  (units   

 (unknown)



                    date)                         Acct:[id number] unknown)  

 

           (unknown)  (no       (unknown)  (unknown)  Dept at   (units    (unkno

wn)



                    date)                         [phone]. unknown)  

 

           (unknown)  (no       (unknown)  (unknown)  Details:  (units    (unkno

wn)



                    date)                                   unknown)  

 

           (unknown)  (no       (unknown)  (unknown)  Documented By:  (units    

(unknown)



                    date)                         oJse Almodovar unknown)  



                                                  MD 22 0810           

 

           (unknown)  (no       (unknown)  (unknown)  Documenting  (units    (un

known)



                    date)                         clinical  unknown)  



                                                  information in           



                                                  EHR/Medical           



                                                  record: 10           

 

           (unknown)  (no       (unknown)  (unknown) Due to the  (units    (unkn

own)



                    date)                         patient's unknown)  



                                                  persistent pain,           



                                                  she wants to move           



                                                  forward with           



                                                  laparoscopy           

 

           (unknown)  (no       (unknown)  (unknown)  EOM: EOM intact  (units   

 (unknown)



                    date)                         bilaterally unknown)  

 

           (unknown)  (no       (unknown)  (unknown)  Ears: hearing  (units    (

unknown)



                    date)                         grossly normal unknown)  



                                                  bilaterally           

 

           (unknown)  (no       (unknown)  (unknown)  Effort +  (units    (unkno

wn)



                    date)                         Inspection: unknown)  



                                                  normal              



                                                  respiratory           



                                                  effort and able           



                                                  to speak in           



                                                  complete            

 

           (unknown)  (no       (unknown)  (unknown)  Endometriosis  (units    (

unknown)



                    date)                         ()   unknown)  

 

           (unknown)  (no       (unknown)  (unknown)  Exam      (units    (unkno

wn)



                    date)                                   unknown)  

 

           (unknown)  (no       (unknown)  (unknown)  External Female  (units   

 (unknown)



                    date)                         Exam: normal unknown)  



                                                  external            



                                                  appearance,           



                                                  normal appearance           



                                                  of the              

 

           (unknown)  (no       (unknown)  (unknown)  Eyes      (units    (unkno

wn)



                    date)                                   unknown)  

 

           (unknown)  (no       (unknown)  (unknown)  Face and sinus:  (units   

 (unknown)



                    date)                         face symmetric unknown)  

 

           (unknown)  (no       (unknown)  (unknown)  [facility]  (units   

 (unknown)



                    date)                         [facility] unknown)  

 

           (unknown)  (no       (unknown)  (unknown)  Follow-up in 4  (units    

(unknown)



                    date)                         weeks or as unknown)  



                                                  needed.             

 

           (unknown)  (no       (unknown)  (unknown)  GI        (units    (unkno

wn)



                    date)                                   unknown)  

 

           (unknown)  (no       (unknown)  (unknown)          (units    (unkno

wn)



                    date)                                   unknown)  

 

           (unknown)  (no       (unknown)  (unknown)  General:  (units    (unkno

wn)



                    date)                         appearance unknown)  



                                                  normal, both eyes           



                                                  and all related           



                                                  structures           

 

           (unknown)  (no       (unknown)  (unknown)  General:  (units    (unkno

wn)



                    date)                         cooperative, unknown)  



                                                  comfortable and           



                                                  no acute distress           

 

           (unknown)  (no       (unknown)  (unknown)  Kateryna returns  (units    (

unknown)



                    date)                         today after 2 unknown)  



                                                  week therapy with           



                                                  levofloxacin and           



                                                  metronidazole.           

 

           (unknown)  (no       (unknown)  (unknown)  Gynecology Visit  (units  

  (unknown)



                    date)                                   unknown)  

 

           (unknown)  (no       (unknown)  (unknown)  HENMT     (units    (unkno

wn)



                    date)                                   unknown)  

 

           (unknown)  (no       (unknown)  (unknown)  HPI       (units    (unkno

wn)



                    date)                                   unknown)  

 

           (unknown)  (no       (unknown)  (unknown)  Head: normal to  (units   

 (unknown)



                    date)                         inspection, unknown)  



                                                  normocephalic and           



                                                  atraumatic           

 

           (unknown)  (no       (unknown)  (unknown)  Heavy menstrual  (units   

 (unknown)



                    date)                         period (2019) unknown)  

 

           (unknown)  (no       (unknown)  (unknown)  Height 5 ft 3.5  (units   

 (unknown)



                    date)                         in        unknown)  

 

           (unknown)  (no       (unknown)  (unknown)  Inspection:  (units    (un

known)



                    date)                         normal to unknown)  



                                                  inspection           

 

           (unknown)  (no       (unknown)  (unknown)  Intake Note:  (units    (u

nknown)



                    date)                                   unknown)  

 

           (unknown)  (no       (unknown)  (unknown)  Intake performed  (units  

  (unknown)



                    date)                         by: Alvarez Woodson unknown)  

 

           (unknown)  (no       (unknown)  (unknown)  Intake    (units    (unkno

wn)



                    date)                                   unknown)  

 

           (unknown)  (no       (unknown)  (unknown)  Intake- Clincial  (units  

  (unknown)



                    date)                         Staff     unknown)  

 

           (unknown)  (no       (unknown)  (unknown)  Irregular  (units    (unkn

own)



                    date)                         menstrual cycle unknown)  



                                                  ()             

 

           (unknown)  (no       (unknown)  (unknown)  Is last   (units    (unkno

wn)



                    date)                         menstrual period unknown)  



                                                  known: No           

 

           (unknown)  (no       (unknown)  (unknown)  Judgment:  (units    (unkn

own)



                    date)                         judgment good unknown)  

 

           (unknown)  (no       (unknown)  (unknown)  Last Menstural  (units    

(unknown)



                    date)                         Cycle + Details unknown)  

 

           (unknown)  (no       (unknown)  (unknown)  Loc: [facility]  (units    (unkno

wn)



                    date)                                   unknown)  

 

           (unknown)  (no       (unknown)  (unknown)  Lymphangioma  (units    (u

nknown)



                    date)                                   unknown)  

 

           (unknown)  (no       (unknown)  (unknown)  Medical History  (units   

 (unknown)



                    date)                         (Reviewed unknown)  



                                                  22 @ 08:13           



                                                  by Alvarez Woodson RN)                 

 

           (unknown)  (no       (unknown)  (unknown)  Medications  (units    (un

known)



                    date)                                   unknown)  

 

           (unknown)  (no       (unknown)  (unknown)  Medications:  (units    (u

nknown)



                    date)                                   unknown)  

 

           (unknown)  (no       (unknown)  (unknown)  Mental Status:  (units    

(unknown)



                    date)                         mental status unknown)  



                                                  grossly normal           

 

           (unknown)  (no       (unknown)  (unknown)  Mood: congruent  (units   

 (unknown)



                    date)                         mood      unknown)  

 

           (unknown)  (no       (unknown)  (unknown)  Neck      (units    (unkno

wn)



                    date)                                   unknown)  

 

           (unknown)  (no       (unknown)  (unknown)  Neck: normal  (units    (u

nknown)



                    date)                         visual inspection unknown)  

 

           (unknown)  (no       (unknown)  (unknown)  New       (units    (unkno

wn)



                    date)                                   unknown)  

 

           (unknown)  (no       (unknown)  (unknown)  Nutritional  (units    (un

known)



                    date)                         Appearance: unknown)  



                                                  average body           



                                                  habitus             

 

           (unknown)  (no       (unknown)  (unknown)  OB/External +  (units    (

unknown)



                    date)                         Speculum: unknown)  



                                                  cervical os open           

 

           (unknown)  (no       (unknown)  (unknown)  Obtaining and/or  (units  

  (unknown)



                    date)                         reviewing unknown)  



                                                  separately           



                                                  obtained history:           



                                                  5                   

 

           (unknown)  (no       (unknown)  (unknown)  Opioids -  (units    (unkn

own)



                    date)                         Morphine  unknown)  



                                                  Analogues Allergy           



                                                  (Intermediate,           



                                                  Verified 22           



                                                  08:14)              

 

           (unknown)  (no       (unknown)  (unknown)  Ordering  (units    (unkno

wn)



                    date)                         medications, unknown)  



                                                  tests, or           



                                                  procedures: 5           

 

           (unknown)  (no       (unknown)  (unknown)  Orders    (units    (unkno

wn)



                    date)                                   unknown)  

 

           (unknown)  (no       (unknown)  (unknown)  Orders:   (units    (unkno

wn)



                    date)                                   unknown)  

 

           (unknown)  (no       (unknown)  (unknown)  Orientation:  (units    (u

nknown)



                    date)                         alert and unknown)  



                                                  oriented x3           

 

           (unknown)  (no       (unknown)  (unknown)  Other Menstrual  (units   

 (unknown)



                    date)                         Period: Other unknown)  

 

           (unknown)  (no       (unknown)  (unknown)  Ovarian cyst  (units    (u

nknown)



                    date)                                   unknown)  

 

           (unknown)  (no       (unknown)  (unknown)  PFSH      (units    (unkno

wn)



                    date)                                   unknown)  

 

           (unknown)  (no       (unknown)  (unknown)  Painful   (units    (unkno

wn)



                    date)                         menstrual periods unknown)  



                                                  ()             

 

           (unknown)  (no       (unknown)  (unknown)  Palpation: soft,  (units  

  (unknown)



                    date)                         no        unknown)  



                                                  hepatosplenomegal           



                                                  y, no masses and           



                                                  tender in the RLQ           

 

           (unknown)  (no       (unknown)  (unknown)  Patient:  (units    (unkno

wn)



                    date)                         Kateryna Jenkins unknown)  



                                                  MR#: [id number]           

 

           (unknown)  (no       (unknown)  (unknown)  Pelvic and  (units    (unk

now)



                    date)                         perineal pain unknown)  

 

           (unknown)  (no       (unknown)  (unknown)  Performing a  (units    (u

nknown)



                    date)                         medically unknown)  



                                                  appropriate exam           



                                                  and/or              



                                                  evaluation: 5           

 

           (unknown)  (no       (unknown)  (unknown)  Plan      (units    (unkno

wn)



                    date)                                   unknown)  

 

           (unknown)  (no       (unknown)  (unknown)  Position Sitting  (units  

  (unknown)



                    date)                                   unknown)  

 

           (unknown)  (no       (unknown)  (unknown)  Preparing to see  (units  

  (unknown)



                    date)                         the patient, unknown)  



                                                  i.e., chart           



                                                  review, review of           



                                                  tests: 5            

 

           (unknown)  (no       (unknown)  (unknown)  Problem-specific  (units  

  (unknown)



                    date)                         ROS positives unknown)  



                                                  included with the           



                                                  HPI                 

 

           (unknown)  (no       (unknown)  (unknown)  Psych     (units    (unkno

wn)



                    date)                                   unknown)  

 

           (unknown)  (no       (unknown)  (unknown)  Pt here for FU  (units    

(unknown)



                    date)                         pelvic pain. unknown)  



                                                  States she is           



                                                  still having pain           

 

           (unknown)  (no       (unknown)  (unknown)  ROS Narrative  (units    (

unknown)



                    date)                                   unknown)  

 

           (unknown)  (no       (unknown)  (unknown)  ROS Narrative:  (units    

(unknown)



                    date)                                   unknown)  

 

           (unknown)  (no       (unknown)  (unknown)  ROS       (units    (unkno

wn)



                    date)                                   unknown)  

 

           (unknown)  (no       (unknown)  (unknown)  Reason For Visit  (units  

  (unknown)



                    date)                                   unknown)  

 

           (unknown)  (no       (unknown)  (unknown)  Recto-Vaginal:  (units    

(unknown)



                    date)                         cul-de-sac unknown)  



                                                  tenderness and no           



                                                  cul-de-sac           



                                                  nodularlity           

 

           (unknown)  (no       (unknown)  (unknown)  Resp      (units    (unkno

wn)



                    date)                                   unknown)  

 

           (unknown)  (no       (unknown)  (unknown)  S/P ACL   (units    (unkno

wn)



                    date)                         reconstruction unknown)  



                                                  (-21)           

 

           (unknown)  (no       (unknown)  (unknown)  Sclera: sclerae  (units   

 (unknown)



                    date)                         normal    unknown)  

 

           (unknown)  (no       (unknown)  (unknown)  She is not noted  (units  

  (unknown)



                    date)                         any significant unknown)  



                                                  difference in her           



                                                  right lower           



                                                  quadrant/right           

 

           (unknown)  (no       (unknown)  (unknown)  Signed By:  (units    (unk

nown)



                    date)                         <Electronically unknown)  



                                                  signed by Jose Almodovar MD>           

 

           (unknown)  (no       (unknown)  (unknown)  Signed    (units    (unkno

wn)



                    date)                                   unknown)  

 

           (unknown)  (no       (unknown)  (unknown)  Smoking Status:  (units   

 (unknown)



                    date)                         Never smoker unknown)  

 

           (unknown)  (no       (unknown)  (unknown)  Speculum Exam -  (units   

 (unknown)



                    date)                         Cervix: normal unknown)  



                                                  appearance of the           



                                                  cervix, cervical           



                                                  os open and           

 

           (unknown)  (no       (unknown)  (unknown)  Speculum Exam -  (units   

 (unknown)



                    date)                         Vagina: normal unknown)  



                                                  appearance of the           



                                                  vagina, abnormal           



                                                  vaginal             

 

           (unknown)  (no       (unknown)  (unknown)  Speculum Exam:  (units    

(unknown)



                    date)                         cervical os open unknown)  

 

           (unknown)  (no       (unknown)  (unknown)  Speech and  (units    (unk

nown)



                    date)                         Movement: speech unknown)  



                                                  and movement           



                                                  normal              

 

           (unknown)  (no       (unknown)  (unknown)  Status: Acute  (units    (

unknown)



                    date)                                   unknown)  

 

           (unknown)  (no       (unknown)  (unknown)  Surgical History  (units  

  (unknown)



                    date)                         (Reviewed unknown)  



                                                  22 @ 08:13           



                                                  by Alvarez Woodson RN)                 

 

           (unknown)  (no       (unknown)  (unknown)  TOA       (units    (unkno

wn)



                    date)                         (tubo-ovarian unknown)  



                                                  abscess)            



                                                  (-21)           

 

           (unknown)  (no       (unknown)  (unknown)  The patient's  (units    (

unknown)



                    date)                         recurrent unknown)  



                                                  bacterial           



                                                  vaginosis is also           



                                                  problematic for           



                                                  her despite           

 

           (unknown)  (no       (unknown)  (unknown)  The patient's  (units    (

unknown)



                    date)                         right lower unknown)  



                                                  quadrant/right           



                                                  hemipelvis pain           



                                                  is unchanged           



                                                  after 2             

 

           (unknown)  (no       (unknown)  (unknown)  This note may  (units    (

unknown)



                    date)                         have been all or unknown)  



                                                  partially           



                                                  generated using           



                                                  voice recognition           

 

           (unknown)  (no       (unknown)  (unknown)  Thought Content:  (units  

  (unknown)



                    date)                         normal    unknown)  

 

           (unknown)  (no       (unknown)  (unknown)  Thought Process:  (units  

  (unknown)



                    date)                         normal    unknown)  

 

           (unknown)  (no       (unknown)  (unknown)  Time Coding  (units    (un

known)



                    date)                         Minutes Spent: unknown)  



                                                  (must be on same           



                                                  date of             



                                                  service/appointme           



                                                  nt)                 

 

           (unknown)  (no       (unknown)  (unknown)  Time Spent  (units    (unk

nown)



                    date)                                   unknown)  

 

           (unknown)  (no       (unknown)  (unknown)  Tobacco +  (units    (unkn

own)



                    date)                         Substance Use unknown)  

 

           (unknown)  (no       (unknown)  (unknown)  Tobacco Status  (units    

(unknown)



                    date)                                   unknown)  

 

           (unknown)  (no       (unknown)  (unknown)  Total Time: 40  (units    

(unknown)



                    date)                                   unknown)  

 

           (unknown)  (no       (unknown)  (unknown)  US pelvic  (units    (unkn

own)



                    date)                         complete 1 Week unknown)  



                                                  G89.29 - Other           



                                                  chronic pain,           



                                                  N70.11 - Chronic           

 

           (unknown)  (no       (unknown)  (unknown)  Urethra: normal  (units   

 (unknown)



                    date)                         appearance of the unknown)  



                                                  urethra and           



                                                  tender              

 

           (unknown)  (no       (unknown)  (unknown)  Visit Reasons:  (units    

(unknown)



                    date)                         F/U Chronic unknown)  



                                                  Pelvic Pain           

 

           (unknown)  (no       (unknown)  (unknown)  Vitals    (units    (unkno

wn)



                    date)                                   unknown)  

 

           (unknown)  (no       (unknown)  (unknown)  Weight 150 lb 1  (units   

 (unknown)



                    date)                         oz        unknown)  

 

           (unknown)  (no       (unknown)  (unknown) abnormal, RSO may  (units  

  (unknown)



                    date)                         be necessary to unknown)  



                                                  give her the best           



                                                  possible chance           



                                                  of resolving           

 

           (unknown)  (no       (unknown)  (unknown)  adnexa),  (units    (unkno

wn)



                    date)                         cul-de-sac unknown)  



                                                  tenderness and No           



                                                  cul-de-sac           



                                                  nodularity           

 

           (unknown)  (no       (unknown)  (unknown)  after completing  (units  

  (unknown)



                    date)                         her antibiotics. unknown)  



                                                  Patient is           



                                                  sexually active           



                                                  but her partner           

 

           (unknown)  (no       (unknown)  (unknown)  also made aware  (units   

 (unknown)



                    date)                         that no assurance unknown)  



                                                  can be made that           



                                                  her pain will be           



                                                  resolved            

 

           (unknown)  (no       (unknown)  (unknown)  also understands  (units  

  (unknown)



                    date)                         that if tube and unknown)  



                                                  ovary are           



                                                  severely scarred           



                                                  or otherwise           

 

           (unknown)  (no       (unknown)  (unknown)  and possible  (units    (u

nknown)



                    date)                         right     unknown)  



                                                  salpingo-oophorec           



                                                  macey to further           



                                                  evaluate and           



                                                  treat her RLQ,           

 

           (unknown)  (no       (unknown)  (unknown)  as the cause of  (units   

 (unknown)



                    date)                         diseases  unknown)  



                                                  classified           



                                                  elsewhere, N76.0           



                                                  - Acute             



                                                  vaginitis, R10.2           

 

           (unknown)  (no       (unknown)  (unknown)  ay occur.  (units    (unkn

own)



                    date)                         Occasional unknown)  



                                                  wrong-word or           



                                                  'sound-alike'           



                                                  substitutions may           



                                                  have                

 

           (unknown)  (no       (unknown)  (unknown)  boric acid  (units    (unk

nown)



                    date)                         capsules 600 mg unknown)  



                                                  w/ or w/o           



                                                  probiotics and           



                                                  re-evaluate in 4           



                                                  weeks. If           

 

           (unknown)  (no       (unknown)  (unknown)  clindamycin HCl  (units   

 (unknown)



                    date)                         150 mg PO DAILY unknown)  



                                                  30 caps 0RF           

 

           (unknown)  (no       (unknown)  (unknown)  clindamycin HCl  (units   

 (unknown)



                    date)                         150 mg capsule unknown)  



                                                  150 mg PO DAILY           



                                                  #30 caps 22           



                                                  [Rx Confirmed           

 

           (unknown)  (no       (unknown)  (unknown)  discharge  (units    (unkn

own)



                    date)                         (Thick, white, + unknown)  



                                                  amine odor;           



                                                  AFFIRM              



                                                  submitted.) and           



                                                  no lesions           

 

           (unknown)  (no       (unknown)  (unknown)  doxycycline  (units    (un

known)



                    date)                         Adverse Reaction unknown)  



                                                  (Intermediate,           



                                                  Verified 22           



                                                  08:14)              

 

           (unknown)  (no       (unknown)  (unknown)  encounter and if  (units  

  (unknown)



                    date)                         true re-infection unknown)  



                                                  would seem less           



                                                  likely than           



                                                  recurrence. Will           

 

           (unknown)  (no       (unknown)  (unknown)  evaluation/treat  (units  

  (unknown)



                    date)                         ment of her unknown)  



                                                  RLQ/pelvic pain.           



                                                  In addition her           



                                                  bacterial           

 

           (unknown)  (no       (unknown)  (unknown)  have occurred.  (units    

(unknown)



                    date)                         If there are any unknown)  



                                                  questions, please           



                                                  contact the           



                                                  Medical Records           

 

           (unknown)  (no       (unknown)  (unknown)  having been  (units    (un

known)



                    date)                         treated with both unknown)  



                                                  metronidazole and           



                                                  clindamycin at           



                                                  various times,           

 

           (unknown)  (no       (unknown)  (unknown)  her chronic  (units    (un

known)



                    date)                         right lower unknown)  



                                                  quadrant pain           



                                                  following her           



                                                  TOA. The results           



                                                  of the              

 

           (unknown)  (no       (unknown)  (unknown)  her discharge  (units    (

unknown)



                    date)                         has resolved, unknown)  



                                                  will then           



                                                  initiate metrogel           



                                                  PV twice weekly           



                                                  for 3-6             

 

           (unknown)  (no       (unknown)  (unknown)  infection which  (units   

 (unknown)



                    date)                         she apparently unknown)  



                                                  had. Surgical           



                                                  case request           



                                                  submitted.           

 

           (unknown)  (no       (unknown)  (unknown)  initiate vaginal  (units  

  (unknown)



                    date)                         clindamycin unknown)  



                                                  capsules 150 mg q           



                                                  HS in conjunction           



                                                  with daily PV           

 

           (unknown)  (no       (unknown)  (unknown)  least weekly  (units    (u

nknown)



                    date)                         during that time. unknown)  

 

           (unknown)  (no       (unknown)  (unknown)  lower quadrant  (units    

(unknown)



                    date)                         pain      unknown)  

 

           (unknown)  (no       (unknown)  (unknown)  months as  (units    (unkn

own)



                    date)                         maintenance unknown)  



                                                  therapy while           



                                                  continuing to use           



                                                  boric acid           



                                                  capsules at           

 

           (unknown)  (no       (unknown)  (unknown)  most recently PO  (units  

  (unknown)



                    date)                         metronidazole x unknown)  



                                                  14 days. She is           



                                                  using condoms           



                                                  with each sexual           

 

           (unknown)  (no       (unknown)  (unknown)  nontender  (units    (unkn

own)



                    date)                                   unknown)  

 

           (unknown)  (no       (unknown)  (unknown)  occurred due to  (units   

 (unknown)



                    date)                         the inherent unknown)  



                                                  limitations of           



                                                  voice recognition           



                                                  software. Please           

 

           (unknown)  (no       (unknown)  (unknown)  ordered pelvic  (units    

(unknown)



                    date)                         ultrasound will unknown)  



                                                  also have a           



                                                  bearing on           



                                                  surgical plan and           



                                                  patient             

 

           (unknown)  (no       (unknown)  (unknown)  oxycodone 5 mg  (units    

(unknown)



                    date)                         tablet 5 mg PO unknown)  



                                                  Q6H PRN pain #10           



                                                  tabs 22 [Rx           



                                                  Confirmed           

 

           (unknown)  (no       (unknown)  (unknown)  post-TOA pain.  (units    

(unknown)



                    date)                         It is hoped that unknown)  



                                                  her ovary and           



                                                  tube can be           



                                                  preserved but she           

 

           (unknown)  (no       (unknown)  (unknown) promethazine 25  (units    

(unknown)



                    date)                         mg tablet 25 mg unknown)  



                                                  PO Q6H PRN           



                                                  22 [History           



                                                  Confirmed           



                                                  22]           

 

           (unknown)  (no       (unknown)  (unknown)  rash      (units    (unkno

wn)



                    date)                                   unknown)  

 

           (unknown)  (no       (unknown)  (unknown)  read the note  (units    (

unknown)



                    date)                         carefully and unknown)  



                                                  recognize, using           



                                                  context, where           



                                                  these               



                                                  substitutions           

 

           (unknown)  (no       (unknown)  (unknown)  residual mass or  (units  

  (unknown)



                    date)                         post inflammatory unknown)  



                                                  phlegmon            



                                                  involving the           



                                                  right adnexa.           



                                                  Pelvic              

 

           (unknown)  (no       (unknown)  (unknown)  salpingitis,  (units    (u

nknown)



                    date)                         N70.93 -  unknown)  



                                                  Salpingitis and           



                                                  oophoritis,           



                                                  unspecified,           



                                                  R10.31 - Right           

 

           (unknown)  (no       (unknown)  (unknown)  sentences  (units    (unkn

own)



                    date)                                   unknown)  

 

           (unknown)  (no       (unknown)  (unknown)  sided pelvic  (units    (u

nknown)



                    date)                         pain. Patient unknown)  



                                                  wishes to proceed           



                                                  with definitive           

 

           (unknown)  (no       (unknown)  (unknown)  software.  (units    (unkn

own)



                    date)                         Although every unknown)  



                                                  effort is made to           



                                                  edit content,           



                                                  transcription           



                                                  errors m            

 

           (unknown)  (no       (unknown)  (unknown)  treated for BV.  (units   

 (unknown)



                    date)                                   unknown)  

 

           (unknown)  (no       (unknown)  (unknown)  ultrasound  (units    (unk

nown)



                    date)                         ordered and will unknown)  



                                                  contact the           



                                                  patient with           



                                                  results when           



                                                  available.           

 

           (unknown)  (no       (unknown)  (unknown)  urethra and  (units    (un

known)



                    date)                         tender    unknown)  

 

           (unknown)  (no       (unknown)  (unknown)  uses condoms  (units    (u

nknown)



                    date)                         with each sexual unknown)  



                                                  encounter.           



                                                  Patient's           



                                                  partners has           



                                                  never been           

 

           (unknown)  (no       (unknown)  (unknown)  vaginosis which  (units   

 (unknown)



                    date)                         initially unknown)  



                                                  improved with           



                                                  therapy has           



                                                  returned within a           



                                                  few days            

 

           (unknown)  (no       (unknown)  (unknown)  vomitt    (units    (unkno

wn)



                    date)                                   unknown)  

 

           (unknown)  (no       (unknown)  (unknown)  weeks of  (units    (unkno

wn)



                    date)                         antibiotic unknown)  



                                                  therapy and her           



                                                  pelvic exam today           



                                                  suggests there           



                                                  may be              

 

           (unknown)  (no       (unknown)  (unknown)  with RSO or  (units    (un

known)



                    date)                         other surgical unknown)  



                                                  procedures,           



                                                  especially           



                                                  following a           



                                                  severe pelvic           









                                         Result panel 19









           (unknown)  (no date)  (unknown)  (unknown)  Negative  (units    (unkn

own)



                                                            unknown)  

 

           (unknown)  (no date)  (unknown)  (unknown)  Negative  (units    (unkn

own)



                                                            unknown)  

 

           (unknown)  (no date)  (unknown)  (unknown)  Positive  (units    (unkn

own)



                                                            unknown)  









                                         Result panel 20









           (unknown)  (no       (unknown)  (unknown)  (no value)  (units    (unk

nown)



                    date)                                   unknown)  

 

           (unknown)  (no       (unknown)  (unknown)  [id number]  (units    (unkno

wn)



                    date)                                   unknown)  

 

           (unknown)  (no       (unknown)  (unknown)  22  (units    (unkno

wn)



                    date)                                   unknown)  

 

           (unknown)  (no       (unknown)  (unknown)  22]  (units    (unkn

own)



                    date)                                   unknown)  

 

           (unknown)  (no       (unknown)  (unknown)  16:12     (units    (unkno

wn)



                    date)                                   unknown)  

 

           (unknown)  (no       (unknown)  (unknown)  Acne (-)  (units    (u

nknown)



                    date)                                   unknown)  

 

           (unknown)  (no       (unknown)  (unknown)  Age/Sex: 17 / F  (units   

 (unknown)



                    date)                         Date of Service: unknown)  

 

           (unknown)  (no       (unknown)  (unknown)  Allergies  (units    (unkn

own)



                    date)                                   unknown)  

 

           (unknown)  (no       (unknown)  (unknown)  [place]  (units    (

unknown)



                    date)                         [id number]     unknown)  

 

           (unknown)  (no       (unknown)  (unknown)  Anesthesia  (units    (unk

nown)



                    date)                                   unknown)  

 

           (unknown)  (no       (unknown)  (unknown)  Attending Dr:  (units    (

unknown)



                    date)                         Jose Almodovar unknown)  



                                                  MD                  

 

           (unknown)  (no       (unknown)  (unknown)  BMI 25.8  (units    (unkno

wn)



                    date)                                   unknown)  

 

           (unknown)  (no       (unknown)  (unknown)  /58 L  (units    (un

known)



                    date)                                   unknown)  

 

           (unknown)  (no       (unknown)  (unknown)  Blood Pressure  (units    

(unknown)



                    date)                         Location Rt unknown)  



                                                  brachial            

 

           (unknown)  (no       (unknown)  (unknown)  Chlamydia  (units    (unkn

own)



                    date)                         infection () unknown)  

 

           (unknown)  (no       (unknown)  (unknown)  : 2005  (units   

 (unknown)



                    date)                         Acct:[id number] unknown)  

 

           (unknown)  (no       (unknown)  (unknown)  Dept at   (units    (unkno

wn)



                    date)                         [phone]. unknown)  

 

           (unknown)  (no       (unknown)  (unknown)  Documented By:  (units    

(unknown)



                    date)                         Jose Almodovar unknown)  



                                                  MD 22 1610           

 

           (unknown)  (no       (unknown)  (unknown)  [employer]     (units    (unkno

wn)



                    date)                                   unknown)  

 

           (unknown)  (no       (unknown)  (unknown)  Endometriosis  (units    (

unknown)



                    date)                         ()   unknown)  

 

           (unknown)  (no       (unknown)  (unknown)  [facility]  (units   

 (unknown)



                    date)                         [facility] unknown)  

 

           (unknown)  (no       (unknown)  (unknown)  Gynecology Visit  (units  

  (unknown)



                    date)                                   unknown)  

 

           (unknown)  (no       (unknown)  (unknown)  Heavy menstrual  (units   

 (unknown)



                    date)                         period () unknown)  

 

           (unknown)  (no       (unknown)  (unknown)  Height 5 ft 3.5  (units   

 (unknown)



                    date)                         in        unknown)  

 

           (unknown)  (no       (unknown)  (unknown)  Intake    (units    (unkno

wn)



                    date)                                   unknown)  

 

           (unknown)  (no       (unknown)  (unknown)  Irregular  (units    (unkn

own)



                    date)                         menstrual cycle unknown)  



                                                  ()             

 

           (unknown)  (no       (unknown)  (unknown)  Last Menstural  (units    

(unknown)



                    date)                         Cycle + Details unknown)  

 

           (unknown)  (no       (unknown)  (unknown)  Loc: [facility]  (units    (unkno

wn)



                    date)                                   unknown)  

 

           (unknown)  (no       (unknown)  (unknown)  Lymphangioma  (units    (u

nknown)



                    date)                                   unknown)  

 

           (unknown)  (no       (unknown)  (unknown)  Medical History  (units   

 (unknown)



                    date)                         (Reviewed unknown)  



                                                  22 @ 08:13           



                                                  by Alvarez Woodson RN)                 

 

           (unknown)  (no       (unknown)  (unknown)  Medications  (units    (un

known)



                    date)                                   unknown)  

 

           (unknown)  (no       (unknown)  (unknown)  Opioids -  (units    (unkn

own)



                    date)                         Morphine  unknown)  



                                                  Analogues Allergy           



                                                  (Intermediate,           



                                                  Verified 22           



                                                  16:10)              

 

           (unknown)  (no       (unknown)  (unknown)  Other Menstrual  (units   

 (unknown)



                    date)                         Period: Other unknown)  

 

           (unknown)  (no       (unknown)  (unknown)  Ovarian cyst  (units    (u

nknown)



                    date)                                   unknown)  

 

           (unknown)  (no       (unknown)  (unknown)  Oxygen Delivery  (units   

 (unknown)



                    date)                         Method simple unknown)  



                                                  mask                

 

           (unknown)  (no       (unknown)  (unknown)  PFSH      (units    (unkno

wn)



                    date)                                   unknown)  

 

           (unknown)  (no       (unknown)  (unknown)  Painful   (units    (unkno

wn)



                    date)                         menstrual periods unknown)  



                                                  ()             

 

           (unknown)  (no       (unknown)  (unknown)  Patient:  (units    (unkno

wn)



                    date)                         Kateryna Jenkins E unknown)  



                                                  MR#: [id number]             

 

           (unknown)  (no       (unknown)  (unknown)  Position Sitting  (units  

  (unknown)



                    date)                                   unknown)  

 

           (unknown)  (no       (unknown)  (unknown)  Pulse 80  (units    (unkno

wn)



                    date)                                   unknown)  

 

           (unknown)  (no       (unknown)  (unknown)  Pulse Oximetry  (units    

(unknown)



                    date)                         (%) 100   unknown)  

 

           (unknown)  (no       (unknown)  (unknown)  Reason For Visit  (units  

  (unknown)



                    date)                                   unknown)  

 

           (unknown)  (no       (unknown)  (unknown)  S/P ACL   (units    (unkno

wn)



                    date)                         reconstruction unknown)  



                                                  ()           

 

           (unknown)  (no       (unknown)  (unknown)  Signed By:  (units    (unk

nown)



                    date)                                   unknown)  

 

           (unknown)  (no       (unknown)  (unknown)  Smoking Status:  (units   

 (unknown)



                    date)                         Never smoker unknown)  

 

           (unknown)  (no       (unknown)  (unknown)  Surgical History  (units  

  (unknown)



                    date)                         (Reviewed unknown)  



                                                  22 @ 08:13           



                                                  by Alvarez Woodson RN)                 

 

           (unknown)  (no       (unknown)  (unknown)  TOA       (units    (unkno

wn)



                    date)                         (tubo-ovarian unknown)  



                                                  abscess)            



                                                  ()           

 

           (unknown)  (no       (unknown)  (unknown)  Temp 98.7 F  (units    (un

known)



                    date)                                   unknown)  

 

           (unknown)  (no       (unknown)  (unknown)  This note may  (units    (

unknown)



                    date)                         have been all or unknown)  



                                                  partially           



                                                  generated using           



                                                  voice recognition           

 

           (unknown)  (no       (unknown)  (unknown)  Tobacco +  (units    (unkn

own)



                    date)                         Substance Use unknown)  

 

           (unknown)  (no       (unknown)  (unknown)  Tobacco Status  (units    

(unknown)



                    date)                                   unknown)  

 

           (unknown)  (no       (unknown)  (unknown)  Visit Reasons:  (units    

(unknown)



                    date)                         Pre op w/covid unknown)  



                                                  test?               

 

           (unknown)  (no       (unknown)  (unknown)  Vitals    (units    (unkno

wn)



                    date)                                   unknown)  

 

           (unknown)  (no       (unknown)  (unknown)  Weight 148 lb  (units    (

unknown)



                    date)                                   unknown)  

 

           (unknown)  (no       (unknown)  (unknown)  clindamycin HCl  (units   

 (unknown)



                    date)                         150 mg capsule unknown)  



                                                  150 mg PO DAILY           



                                                  #30 caps 22           



                                                  [Rx Confirmed           

 

           (unknown)  (no       (unknown)  (unknown)  doxycycline  (units    (un

known)



                    date)                         Adverse Reaction unknown)  



                                                  (Intermediate,           



                                                  Verified 22           



                                                  16:10)              

 

           (unknown)  (no       (unknown)  (unknown)  have occurred.  (units    

(unknown)



                    date)                         If there are any unknown)  



                                                  questions, please           



                                                  contact the           



                                                  Medical Records           

 

           (unknown)  (no       (unknown)  (unknown)  may occur.  (units    (unk

nown)



                    date)                         Occasional unknown)  



                                                  wrong-word or           



                                                  'sound-alike'           



                                                  substitutions may           



                                                  have                

 

           (unknown)  (no       (unknown)  (unknown)  occurred due to  (units   

 (unknown)



                    date)                         the inherent unknown)  



                                                  limitations of           



                                                  voice recognition           



                                                  software. Please           

 

           (unknown)  (no       (unknown)  (unknown)  oxycodone 5 mg  (units    

(unknown)



                    date)                         tablet 5 mg PO unknown)  



                                                  Q6H PRN pain #10           



                                                  tabs 22 [Rx           



                                                  Confirmed           

 

           (unknown)  (no       (unknown)  (unknown) promethazine 25  (units    

(unknown)



                    date)                         mg tablet 25 mg unknown)  



                                                  PO Q6H PRN           



                                                  22 [History           



                                                  Confirmed           



                                                  22]           

 

           (unknown)  (no       (unknown)  (unknown)  rash      (units    (unkno

wn)



                    date)                                   unknown)  

 

           (unknown)  (no       (unknown)  (unknown)  read the note  (units    (

unknown)



                    date)                         carefully and unknown)  



                                                  recognize, using           



                                                  context, where           



                                                  these               



                                                  substitutions           

 

           (unknown)  (no       (unknown)  (unknown)  software.  (units    (unkn

own)



                    date)                         Although every unknown)  



                                                  effort is made to           



                                                  edit content,           



                                                  transcription           



                                                  errors              

 

           (unknown)  (no       (unknown)  (unknown)  vomitt    (units    (unkno

wn)



                    date)                                   unknown)  









                                         Result panel 21









           (unknown)  (no       (unknown)  (unknown)  (no value)  (units    (unk

nown)



                    date)                                   unknown)  

 

           (unknown)  (no       (unknown)  (unknown)  [id number]  (units    (unkno

wn)



                    date)                                   unknown)  

 

           (unknown)  (no       (unknown)  (unknown)  22  (units    (unkno

wn)



                    date)                                   unknown)  

 

           (unknown)  (no       (unknown)  (unknown)  22]  (units    (unkn

own)



                    date)                                   unknown)  

 

           (unknown)  (no       (unknown)  (unknown)  16:12     (units    (unkno

wn)



                    date)                                   unknown)  

 

           (unknown)  (no       (unknown)  (unknown)   with an 8  (units    

(unknown)



                    date)                         month history of unknown)  



                                                  right lower           



                                                  quadrant pain           



                                                  following           



                                                  right-sided           

 

           (unknown)  (no       (unknown)  (unknown)  Acne (-2016)  (units    (u

nknown)



                    date)                                   unknown)  

 

           (unknown)  (no       (unknown)  (unknown)  Affect: normal  (units    

(unknown)



                    date)                         affect    unknown)  

 

           (unknown)  (no       (unknown)  (unknown)  Age/Sex: 17 / F  (units   

 (unknown)



                    date)                         Date of Service: unknown)  

 

           (unknown)  (no       (unknown)  (unknown)  Allergies  (units    (unkn

own)



                    date)                                   unknown)  

 

           (unknown)  (no       (unknown)  (unknown)  [place]  (units    (

unknown)



                    date)                         [id number]     unknown)  

 

           (unknown)  (no       (unknown)  (unknown)  Anesthesia  (units    (unk

nown)



                    date)                                   unknown)  

 

           (unknown)  (no       (unknown)  (unknown)  Appearance:  (units    (un

known)



                    date)                         grossly normal unknown)  

 

           (unknown)  (no       (unknown)  (unknown)  Assessment + Plan  (units 

   (unknown)



                    date)                                   unknown)  

 

           (unknown)  (no       (unknown)  (unknown)  Attending Dr:  (units    (

unknown)



                    date)                         Jose Almodovar MD unknown)  

 

           (unknown)  (no       (unknown)  (unknown)  Attitude:  (units    (unkn

own)



                    date)                         cooperative unknown)  

 

           (unknown)  (no       (unknown)  (unknown)  Auscultation:  (units    (

unknown)



                    date)                         clear to  unknown)  



                                                  auscultation           



                                                  bilaterally           

 

           (unknown)  (no       (unknown)  (unknown)  BMI 25.8  (units    (unkno

wn)



                    date)                                   unknown)  

 

           (unknown)  (no       (unknown)  (unknown)  /58 L  (units    (un

known)



                    date)                                   unknown)  

 

           (unknown)  (no       (unknown)  (unknown)  Blood Pressure  (units    

(unknown)



                    date)                         Location Rt unknown)  



                                                  brachial            

 

           (unknown)  (no       (unknown)  (unknown)  COVID-19  (units    (unkno

wn)



                    date)                                   unknown)  

 

           (unknown)  (no       (unknown)  (unknown)  COVID19 -Nasal  (units    

(unknown)



                    date)                         RAPID/Pre-Proc unknown)  



                                                  Today [id number] -           



                                                  Encounter for           



                                                  preprocedural           

 

           (unknown)  (no       (unknown)  (unknown)  Cardio    (units    (unkno

wn)



                    date)                                   unknown)  

 

           (unknown)  (no       (unknown)  (unknown)  Chief Complaint  (units   

 (unknown)



                    date)                                   unknown)  

 

           (unknown)  (no       (unknown)  (unknown)  Chief Complaint:  (units  

  (unknown)



                    date)                         Pre-op visit unknown)  

 

           (unknown)  (no       (unknown)  (unknown)  Chlamydia  (units    (unkn

own)



                    date)                         infection () unknown)  

 

           (unknown)  (no       (unknown)  (unknown)  Conjunctivae:  (units    (

unknown)



                    date)                         conjunctivae unknown)  



                                                  normal              

 

           (unknown)  (no       (unknown)  (unknown)  Const     (units    (unkno

wn)



                    date)                                   unknown)  

 

           (unknown)  (no       (unknown)  (unknown)  : 2005  (units   

 (unknown)



                    date)                         Acct:[id number] unknown)  

 

           (unknown)  (no       (unknown)  (unknown)  Dept at   (units    (unkno

wn)



                    date)                         [phone]. unknown)  

 

           (unknown)  (no       (unknown)  (unknown)  Details:  (units    (unkno

wn)



                    date)                                   unknown)  

 

           (unknown)  (no       (unknown)  (unknown)  Documented By:  (units    

(unknown)



                    date)                         Jose Almodovar MD unknown)  



                                                  22 1610           

 

           (unknown)  (no       (unknown)  (unknown)  Draft     (units    (unkno

wn)



                    date)                                   unknown)  

 

           (unknown)  (no       (unknown)  (unknown)  EOM: EOM intact  (units   

 (unknown)



                    date)                         bilaterally unknown)  

 

           (unknown)  (no       (unknown)  (unknown)  Ears: hearing  (units    (

unknown)



                    date)                         grossly normal unknown)  



                                                  bilaterally           

 

           (unknown)  (no       (unknown)  (unknown)  Effort +  (units    (unkno

wn)



                    date)                         Inspection: normal unknown)  



                                                  respiratory effort           



                                                  and able to speak           



                                                  in complete           

 

           (unknown)  (no       (unknown)  (unknown)  Endometriosis  (units    (

unknown)



                    date)                         ()   unknown)  

 

           (unknown)  (no       (unknown)  (unknown)  Exam      (units    (unkno

wn)



                    date)                                   unknown)  

 

           (unknown)  (no       (unknown)  (unknown)  Eyes      (units    (unkno

wn)



                    date)                                   unknown)  

 

           (unknown)  (no       (unknown)  (unknown)  Face and sinus:  (units   

 (unknown)



                    date)                         face symmetric unknown)  

 

           (unknown)  (no       (unknown)  (unknown)  [facility]  (units   

 (unknown)



                    date)                         [facility] unknown)  

 

           (unknown)  (no       (unknown)  (unknown)          (units    (unkno

wn)



                    date)                                   unknown)  

 

           (unknown)  (no       (unknown)  (unknown)  General:  (units    (unkno

wn)



                    date)                         appearance normal, unknown)  



                                                  both eyes and all           



                                                  related structures           

 

           (unknown)  (no       (unknown)  (unknown)  General:  (units    (unkno

wn)



                    date)                         cooperative, unknown)  



                                                  comfortable and no           



                                                  acute distress           

 

           (unknown)  (no       (unknown)  (unknown)  General: deferred  (units 

   (unknown)



                    date)                                   unknown)  

 

           (unknown)  (no       (unknown)  (unknown)  Kateryna is a  (units    (unk

nown)



                    date)                         17-year-old unknown)  



                                                  nulligravida, LMP           



                                                  in May 2022 who           



                                                  presented in           



                                                  August              

 

           (unknown)  (no       (unknown)  (unknown)  Gynecology Visit  (units  

  (unknown)



                    date)                                   unknown)  

 

           (unknown)  (no       (unknown)  (unknown)  HENMT     (units    (unkno

wn)



                    date)                                   unknown)  

 

           (unknown)  (no       (unknown)  (unknown)  HPI       (units    (unkno

wn)



                    date)                                   unknown)  

 

           (unknown)  (no       (unknown)  (unknown)  Head: normal to  (units   

 (unknown)



                    date)                         inspection, unknown)  



                                                  normocephalic and           



                                                  atraumatic           

 

           (unknown)  (no       (unknown)  (unknown)  Heart Sounds: S1  (units  

  (unknown)



                    date)                         normal and S2 unknown)  



                                                  normal              

 

           (unknown)  (no       (unknown)  (unknown)  Heavy menstrual  (units   

 (unknown)



                    date)                         period (-2019) unknown)  

 

           (unknown)  (no       (unknown)  (unknown)  Height 5 ft 3.5  (units   

 (unknown)



                    date)                         in        unknown)  

 

           (unknown)  (no       (unknown)  (unknown)  Hospital in  (units    (un

known)



                    date)                         [place] back in unknown)  



                                                  2021.? No           



                                                  records are           



                                                  available for           



                                                  review              

 

           (unknown)  (no       (unknown)  (unknown)  Intake    (units    (unkno

wn)



                    date)                                   unknown)  

 

           (unknown)  (no       (unknown)  (unknown)  Irregular  (units    (unkn

own)



                    date)                         menstrual cycle unknown)  



                                                  ()             

 

           (unknown)  (no       (unknown)  (unknown)  Judgment:  (units    (unkn

own)



                    date)                         judgment good unknown)  

 

           (unknown)  (no       (unknown)  (unknown)  Last Menstural  (units    

(unknown)



                    date)                         Cycle + Details unknown)  

 

           (unknown)  (no       (unknown)  (unknown)  Loc: [facility]  (units    (unkno

wn)



                    date)                                   unknown)  

 

           (unknown)  (no       (unknown)  (unknown)  Lymphangioma  (units    (u

nknown)



                    date)                                   unknown)  

 

           (unknown)  (no       (unknown)  (unknown)  Medical History  (units   

 (unknown)



                    date)                         (Reviewed 22 unknown)  



                                                  @ 08:13 by Alvarez Woodson RN)           

 

           (unknown)  (no       (unknown)  (unknown)  Medications  (units    (un

known)



                    date)                                   unknown)  

 

           (unknown)  (no       (unknown)  (unknown)  Mental Status:  (units    

(unknown)



                    date)                         mental status unknown)  



                                                  grossly normal           

 

           (unknown)  (no       (unknown)  (unknown)  Mood: congruent  (units   

 (unknown)



                    date)                         mood      unknown)  

 

           (unknown)  (no       (unknown)  (unknown)  Neck      (units    (unkno

wn)



                    date)                                   unknown)  

 

           (unknown)  (no       (unknown)  (unknown)  Neck: normal  (units    (u

nknown)



                    date)                         visual inspection unknown)  

 

           (unknown)  (no       (unknown)  (unknown)  Nutritional  (units    (un

known)



                    date)                         Appearance: unknown)  



                                                  average body           



                                                  habitus             

 

           (unknown)  (no       (unknown)  (unknown)  Opioids -  (units    (unkn

own)



                    date)                         Morphine Analogues unknown)  



                                                  Allergy             



                                                  (Intermediate,           



                                                  Verified 22           



                                                  16:10)              

 

           (unknown)  (no       (unknown)  (unknown)  Orders    (units    (unkno

wn)



                    date)                                   unknown)  

 

           (unknown)  (no       (unknown)  (unknown)  Orders:   (units    (unkno

wn)



                    date)                                   unknown)  

 

           (unknown)  (no       (unknown)  (unknown)  Orientation:  (units    (u

nknown)



                    date)                         alert and oriented unknown)  



                                                  x3                  

 

           (unknown)  (no       (unknown)  (unknown)  Other Menstrual  (units   

 (unknown)



                    date)                         Period: Other unknown)  

 

           (unknown)  (no       (unknown)  (unknown)  Ovarian cyst  (units    (u

nknown)



                    date)                                   unknown)  

 

           (unknown)  (no       (unknown)  (unknown)  Oxygen Delivery  (units   

 (unknown)



                    date)                         Method simple mask unknown)  

 

           (unknown)  (no       (unknown)  (unknown)  PFSH      (units    (unkno

wn)



                    date)                                   unknown)  

 

           (unknown)  (no       (unknown)  (unknown)  Painful menstrual  (units 

   (unknown)



                    date)                         periods () unknown)  

 

           (unknown)  (no       (unknown)  (unknown)  Patient:  (units    (unkno

wn)



                    date)                         Kateryna Jenkins unknown)  



                                                  MR#: [id number]             

 

           (unknown)  (no       (unknown)  (unknown)  Position Sitting  (units  

  (unknown)



                    date)                                   unknown)  

 

           (unknown)  (no       (unknown)  (unknown)  Problem-specific  (units  

  (unknown)



                    date)                         ROS positives unknown)  



                                                  included with the           



                                                  HPI                 

 

           (unknown)  (no       (unknown)  (unknown)  Psych     (units    (unkno

wn)



                    date)                                   unknown)  

 

           (unknown)  (no       (unknown)  (unknown)  Pulse 80  (units    (unkno

wn)



                    date)                                   unknown)  

 

           (unknown)  (no       (unknown)  (unknown)  Pulse Oximetry  (units    

(unknown)



                    date)                         (%) 100   unknown)  

 

           (unknown)  (no       (unknown)  (unknown)  ROS Narrative  (units    (

unknown)



                    date)                                   unknown)  

 

           (unknown)  (no       (unknown)  (unknown)  ROS Narrative:  (units    

(unknown)



                    date)                                   unknown)  

 

           (unknown)  (no       (unknown)  (unknown)  ROS       (units    (unkno

wn)



                    date)                                   unknown)  

 

           (unknown)  (no       (unknown)  (unknown)  Rate: regular  (units    (

unknown)



                    date)                         rate      unknown)  

 

           (unknown)  (no       (unknown)  (unknown)  Reason For Visit  (units  

  (unknown)



                    date)                                   unknown)  

 

           (unknown)  (no       (unknown)  (unknown)  Resp      (units    (unkno

wn)



                    date)                                   unknown)  

 

           (unknown)  (no       (unknown)  (unknown)  Rhythm: regular  (units   

 (unknown)



                    date)                         rhythm    unknown)  

 

           (unknown)  (no       (unknown)  (unknown)  S/P ACL   (units    (unkno

wn)



                    date)                         reconstruction unknown)  



                                                  (-21)           

 

           (unknown)  (no       (unknown)  (unknown)  Sclera: sclerae  (units   

 (unknown)



                    date)                         normal    unknown)  

 

           (unknown)  (no       (unknown)  (unknown)  Signed By:  (units    (unk

nown)



                    date)                                   unknown)  

 

           (unknown)  (no       (unknown)  (unknown)  Smoking Status:  (units   

 (unknown)



                    date)                         Never smoker unknown)  

 

           (unknown)  (no       (unknown)  (unknown)  Speech and  (units    (unk

nown)



                    date)                         Movement: speech unknown)  



                                                  and movement           



                                                  normal              

 

           (unknown)  (no       (unknown)  (unknown)  Surgical History  (units  

  (unknown)



                    date)                         (Reviewed 22 unknown)  



                                                  @ 08:13 by Alvarez Woodson RN)           

 

           (unknown)  (no       (unknown)  (unknown)  TOA (tubo-ovarian  (units 

   (unknown)



                    date)                         abscess)  unknown)  



                                                  (-21)           

 

           (unknown)  (no       (unknown)  (unknown)  Temp 98.7 F  (units    (un

known)



                    date)                                   unknown)  

 

           (unknown)  (no       (unknown)  (unknown)  This note may  (units    (

unknown)



                    date)                         have been all or unknown)  



                                                  partially           



                                                  generated using           



                                                  voice recognition           

 

           (unknown)  (no       (unknown)  (unknown)  Thought Content:  (units  

  (unknown)



                    date)                         normal    unknown)  

 

           (unknown)  (no       (unknown)  (unknown)  Thought Process:  (units  

  (unknown)



                    date)                         normal    unknown)  

 

           (unknown)  (no       (unknown)  (unknown)  Tobacco +  (units    (unkn

own)



                    date)                         Substance Use unknown)  

 

           (unknown)  (no       (unknown)  (unknown)  Tobacco Status  (units    

(unknown)



                    date)                                   unknown)  

 

           (unknown)  (no       (unknown)  (unknown)  Visit Reasons:  (units    

(unknown)



                    date)                         Pre op w/covid unknown)  



                                                  test?               

 

           (unknown)  (no       (unknown)  (unknown)  Vitals    (units    (unkno

wn)



                    date)                                   unknown)  

 

           (unknown)  (no       (unknown)  (unknown)  Weight 148 lb  (units    (

unknown)



                    date)                                   unknown)  

 

           (unknown)  (no       (unknown)  (unknown)  and therefore  (units    (

unknown)



                    date)                         discontinue the unknown)  



                                                  oral antibiotics.?           



                                                  As a result she           



                                                  only had            

 

           (unknown)  (no       (unknown)  (unknown)  clindamycin HCl  (units   

 (unknown)



                    date)                         150 mg capsule 150 unknown)  



                                                  mg PO DAILY #30           



                                                  caps 22 [Rx           



                                                  Confirmed           

 

           (unknown)  (no       (unknown)  (unknown)  considering all  (units   

 (unknown)



                    date)                         options, the unknown)  



                                                  patient is           



                                                  proceeding to           



                                                  diagnostic           



                                                  laparoscopy           

 

           (unknown)  (no       (unknown)  (unknown)  counseling, and  (units   

 (unknown)



                    date)                         consent.  unknown)  

 

           (unknown)  (no       (unknown)  (unknown)  doxycycline  (units    (un

known)



                    date)                         Adverse Reaction unknown)  



                                                  (Intermediate,           



                                                  Verified 22           



                                                  16:10)              

 

           (unknown)  (no       (unknown)  (unknown)  follow-up from an  (units 

   (unknown)



                    date)                         ER visit at unknown)  



                                                  [facility] on           



                                                  2022 at           



                                                  which               

 

           (unknown)  (no       (unknown)  (unknown)  following IV  (units    (u

nknown)



                    date)                         antibiotics but unknown)  



                                                  apparently had an           



                                                  adverse reaction           



                                                  to Vibramycin           

 

           (unknown)  (no       (unknown)  (unknown)  has had only very  (units 

   (unknown)



                    date)                         irregular cycles unknown)  



                                                  since the TOA or           



                                                  as she had regular           

 

           (unknown)  (no       (unknown)  (unknown)  have occurred. If  (units 

   (unknown)



                    date)                         there are any unknown)  



                                                  questions, please           



                                                  contact the           



                                                  Medical Records           

 

           (unknown)  (no       (unknown)  (unknown)  incapacitating  (units    

(unknown)



                    date)                         pain. She presents unknown)  



                                                  today for her           



                                                  preoperative           



                                                  evaluation,           

 

           (unknown)  (no       (unknown)  (unknown)  incompletely  (units    (u

nknown)



                    date)                         treated TOA didn't unknown)  



                                                  provide any           



                                                  improvement in her           



                                                  pain and after           

 

           (unknown)  (no       (unknown)  (unknown)  intravenous  (units    (un

known)



                    date)                         antibiotic therapy unknown)  



                                                  and no follow-up           



                                                  oral antibiotic           



                                                  therapy.? Since           

 

           (unknown)  (no       (unknown)  (unknown)  laboratory  (units    (unk

nown)



                    date)                         examination, unknown)  



                                                  Z20.822 - Contact           



                                                  with and            



                                                  (suspected)           



                                                  exposure to           

 

           (unknown)  (no       (unknown)  (unknown)  may occur.  (units    (unk

nown)



                    date)                         Occasional unknown)  



                                                  wrong-word or           



                                                  'sound-alike'           



                                                  substitutions may           



                                                  have                

 

           (unknown)  (no       (unknown)  (unknown)  occurred due to  (units   

 (unknown)



                    date)                         the inherent unknown)  



                                                  limitations of           



                                                  voice recognition           



                                                  software. Please           

 

           (unknown)  (no       (unknown)  (unknown)  of that   (units    (unkno

wn)



                    date)                         hospitalization unknown)  



                                                  but apparently she           



                                                  was treated with           



                                                  IV antibiotics and           

 

           (unknown)  (no       (unknown)  (unknown)  ovary. A two week  (units 

   (unknown)



                    date)                         trial of PO AB's unknown)  



                                                  for suspected           



                                                  chronic PID           



                                                  following           

 

           (unknown)  (no       (unknown)  (unknown)  oxycodone 5 mg  (units    

(unknown)



                    date)                         tablet 5 mg PO Q6H unknown)  



                                                  PRN pain #10 tabs           



                                                  22 [Rx           



                                                  Confirmed           

 

           (unknown)  (no       (unknown)  (unknown)  percutaneous  (units    (u

nknown)



                    date)                         drainage of the unknown)  



                                                  abscess.? She was           



                                                  placed on oral           



                                                  antibiotics           

 

           (unknown)  (no       (unknown)  (unknown)  predictable  (units    (un

known)



                    date)                         periods up until unknown)  



                                                  that time.? She           



                                                  presented in           



                                                  consultation in           

 

           (unknown)  (no       (unknown)  (unknown) promethazine 25 mg  (units 

   (unknown)



                    date)                         tablet 25 mg PO unknown)  



                                                  Q6H PRN 22           



                                                  [History Confirmed           



                                                  22]           

 

           (unknown)  (no       (unknown)  (unknown)  rash      (units    (unkno

wn)



                    date)                                   unknown)  

 

           (unknown)  (no       (unknown)  (unknown)  read the note  (units    (

unknown)



                    date)                         carefully and unknown)  



                                                  recognize, using           



                                                  context, where           



                                                  these               



                                                  substitutions           

 

           (unknown)  (no       (unknown)  (unknown)  sentences  (units    (unkn

own)



                    date)                                   unknown)  

 

           (unknown)  (no       (unknown)  (unknown)  software.  (units    (unkn

own)



                    date)                         Although every unknown)  



                                                  effort is made to           



                                                  edit content,           



                                                  transcription           



                                                  errors              

 

           (unknown)  (no       (unknown)  (unknown) that time she is  (units   

 (unknown)



                    date)                         had persistent unknown)  



                                                  right lower           



                                                  quadrant pain           



                                                  which is noncyclic           



                                                  and                 

 

           (unknown)  (no       (unknown)  (unknown)  time she  (units    (unkno

wn)



                    date)                         underwent unknown)  



                                                  abdominal/pelvic           



                                                  CT and pelvic           



                                                  ultrasound which           



                                                  were largely           

 

           (unknown)  (no       (unknown)  (unknown)  tubo-ovarian  (units    (u

nknown)



                    date)                         abscess due to unknown)  



                                                  chlamydia treated           



                                                  as an inpatient at           



                                                  [facility]             

 

           (unknown)  (no       (unknown)  (unknown)  unremarkable with  (units 

   (unknown)



                    date)                         the exception of a unknown)  



                                                  3 cm hemorrhagic           



                                                  cyst within the           



                                                  right               

 

           (unknown)  (no       (unknown)  (unknown)  vomitt    (units    (unkno

wn)



                    date)                                   unknown)  

 

           (unknown)  (no       (unknown)  (unknown)  with possible  (units    (

unknown)



                    date)                         right     unknown)  



                                                  salpingo-oophorect           



                                                  [place] for persistent           



                                                  and at times           









                                         Result panel 22









           (unknown)  (no       (unknown)  (unknown)  (no value)  (units    (unk

nown)



                    date)                                   unknown)  

 

           (unknown)  (no       (unknown)  (unknown)  (1) Pelvic pain:  (units  

  (unknown)



                    date)                                   unknown)  

 

           (unknown)  (no       (unknown)  (unknown)  (2) Chronic right  (units 

   (unknown)



                    date)                         lower quadrant pain: unknown) 

 

 

           (unknown)  (no       (unknown)  (unknown)  [id number]  (units    (unkno

wn)



                    date)                                   unknown)  

 

           (unknown)  (no       (unknown)  (unknown)  22  (units    (unkno

wn)



                    date)                                   unknown)  

 

           (unknown)  (no       (unknown)  (unknown)  22]  (units    (unkn

own)



                    date)                                   unknown)  

 

           (unknown)  (no       (unknown)  (unknown)  16:12     (units    (unkno

wn)



                    date)                                   unknown)  

 

           (unknown)  (no       (unknown)  (unknown)   with an 8  (units    

(unknown)



                    date)                         month history of unknown)  



                                                  right lower quadrant          

 



                                                  pain following           



                                                  right-sided           

 

           (unknown)  (no       (unknown)  (unknown)  Acne (-2016)  (units    (u

nknown)



                    date)                                   unknown)  

 

           (unknown)  (no       (unknown)  (unknown)  Affect: normal  (units    

(unknown)



                    date)                         affect    unknown)  

 

           (unknown)  (no       (unknown)  (unknown)  Age/Sex: 17 / F  (units   

 (unknown)



                    date)                         Date of Service: unknown)  

 

           (unknown)  (no       (unknown)  (unknown)  Allergies  (units    (unkn

own)



                    date)                                   unknown)  

 

           (unknown)  (no       (unknown)  (unknown)  [place] [address]  (unit

s    (unknown)



                    date)                                   unknown)  

 

           (unknown)  (no       (unknown)  (unknown)  Anesthesia  (units    (unk

nown)



                    date)                                   unknown)  

 

           (unknown)  (no       (unknown)  (unknown)  Appearance: grossly  (unit

s    (unknown)



                    date)                         normal    unknown)  

 

           (unknown)  (no       (unknown)  (unknown)  Assessment + Plan  (units 

   (unknown)



                    date)                                   unknown)  

 

           (unknown)  (no       (unknown)  (unknown)  Attending Dr:  (units    (

unknown)



                    date)                         Jose Almodovar MD unknown)  

 

           (unknown)  (no       (unknown)  (unknown)  Attitude:  (units    (unkn

own)



                    date)                         cooperative unknown)  

 

           (unknown)  (no       (unknown)  (unknown)  Auscultation: clear  (unit

s    (unknown)



                    date)                         to auscultation unknown)  



                                                  bilaterally           

 

           (unknown)  (no       (unknown)  (unknown)  BMI 25.8  (units    (unkno

wn)



                    date)                                   unknown)  

 

           (unknown)  (no       (unknown)  (unknown)  /58 L  (units    (un

known)



                    date)                                   unknown)  

 

           (unknown)  (no       (unknown)  (unknown) Bimanual Exam-  (units    (

unknown)



                    date)                         Adnexa, other: unknown)  



                                                  normal, tender           



                                                  (Marked, right) and           



                                                  mass (Fullness,           

 

           (unknown)  (no       (unknown)  (unknown)  Bimanual Exam-  (units    

(unknown)



                    date)                         Vagina + Uterus: unknown)  



                                                  uterine size normal           



                                                  and tender (Mild)           

 

           (unknown)  (no       (unknown)  (unknown)  Blood Pressure  (units    

(unknown)



                    date)                         Location Rt brachial unknown) 

 

 

           (unknown)  (no       (unknown)  (unknown)  COVID-19  (units    (unkno

wn)



                    date)                                   unknown)  

 

           (unknown)  (no       (unknown)  (unknown)  COVID19 -Nasal  (units    

(unknown)



                    date)                         RAPID/Pre-Proc Today unknown) 

 



                                                  Z01.812 - Encounter           



                                                  for preprocedural           

 

           (unknown)  (no       (unknown)  (unknown)  Cardio    (units    (unkno

wn)



                    date)                                   unknown)  

 

           (unknown)  (no       (unknown)  (unknown)  Chief Complaint  (units   

 (unknown)



                    date)                                   unknown)  

 

           (unknown)  (no       (unknown)  (unknown)  Chief Complaint:  (units  

  (unknown)



                    date)                         Pre-op visit unknown)  

 

           (unknown)  (no       (unknown)  (unknown)  Chlamydia infection  (unit

s    (unknown)



                    date)                         ()   unknown)  

 

           (unknown)  (no       (unknown)  (unknown)  Conjunctivae:  (units    (

unknown)



                    date)                         conjunctivae normal unknown)  

 

           (unknown)  (no       (unknown)  (unknown)  Const     (units    (unkno

wn)



                    date)                                   unknown)  

 

           (unknown)  (no       (unknown)  (unknown)  : 2005  (units   

 (unknown)



                    date)                         Acct:[id number] unknown)  

 

           (unknown)  (no       (unknown)  (unknown)  Dept at   (units    (unkno

wn)



                    date)                         [phone] unknown)  

 

           (unknown)  (no       (unknown)  (unknown)  Details:  (units    (unkno

wn)



                    date)                                   unknown)  

 

           (unknown)  (no       (unknown)  (unknown)  Documented By:  (units    

(unknown)



                    date)                         Jose Almodovar MD unknown)  



                                                  22 1610           

 

           (unknown)  (no       (unknown)  (unknown)  Draft     (units    (unkno

wn)



                    date)                                   unknown)  

 

           (unknown)  (no       (unknown)  (unknown)  EOM: EOM intact  (units   

 (unknown)



                    date)                         bilaterally unknown)  

 

           (unknown)  (no       (unknown)  (unknown)  Ears: hearing  (units    (

unknown)



                    date)                         grossly normal unknown)  



                                                  bilaterally           

 

           (unknown)  (no       (unknown)  (unknown)  Effort +  (units    (unkno

wn)



                    date)                         Inspection: normal unknown)  



                                                  respiratory effort           



                                                  and able to speak in          

 



                                                  complete            

 

           (unknown)  (no       (unknown)  (unknown)  Endometriosis  (units    (

unknown)



                    date)                         ()   unknown)  

 

           (unknown)  (no       (unknown)  (unknown)  Exam      (units    (unkno

wn)



                    date)                                   unknown)  

 

           (unknown)  (no       (unknown)  (unknown)  External Female  (units   

 (unknown)



                    date)                         Exam: normal unknown)  



                                                  external appearance           



                                                  and normal           



                                                  appearance of the           

 

           (unknown)  (no       (unknown)  (unknown)  Eyes      (units    (unkno

wn)



                    date)                                   unknown)  

 

           (unknown)  (no       (unknown)  (unknown)  Face and sinus:  (units   

 (unknown)



                    date)                         face symmetric unknown)  

 

           (unknown)  (no       (unknown)  (unknown)  [facility]  (units   

 (unknown)



                    date)                         [facility] unknown)  

 

           (unknown)  (no       (unknown)  (unknown)  GI        (units    (unkno

wn)



                    date)                                   unknown)  

 

           (unknown)  (no       (unknown)  (unknown)          (units    (unkno

wn)



                    date)                                   unknown)  

 

           (unknown)  (no       (unknown)  (unknown)  General: appearance  (unit

s    (unknown)



                    date)                         normal, both eyes unknown)  



                                                  and all related           



                                                  structures           

 

           (unknown)  (no       (unknown)  (unknown)  General:  (units    (unkno

wn)



                    date)                         cooperative, unknown)  



                                                  comfortable and no           



                                                  acute distress           

 

           (unknown)  (no       (unknown)  (unknown)  Kateryna is a  (units    (unk

nown)



                    date)                         17-year-old unknown)  



                                                  nulligravida, LMP in          

 



                                                  May 2022 who           



                                                  presented in August           

 

           (unknown)  (no       (unknown)  (unknown)  Gynecology Visit  (units  

  (unknown)



                    date)                                   unknown)  

 

           (unknown)  (no       (unknown)  (unknown)  HENMT     (units    (unkno

wn)



                    date)                                   unknown)  

 

           (unknown)  (no       (unknown)  (unknown)  HPI       (units    (unkno

wn)



                    date)                                   unknown)  

 

           (unknown)  (no       (unknown)  (unknown)  Head: normal to  (units   

 (unknown)



                    date)                         inspection, unknown)  



                                                  normocephalic and           



                                                  atraumatic           

 

           (unknown)  (no       (unknown)  (unknown)  Heart Sounds: S1  (units  

  (unknown)



                    date)                         normal, S2 normal unknown)  



                                                  and no murmurs           

 

           (unknown)  (no       (unknown)  (unknown)  Heavy menstrual  (units   

 (unknown)



                    date)                         period (-2019) unknown)  

 

           (unknown)  (no       (unknown)  (unknown)  Height 5 ft 3.5 in  (units

    (unknown)



                    date)                                   unknown)  

 

           (unknown)  (no       (unknown)  (unknown)  [facility]  (unit

s    (unknown)



                    date)                         back in December unknown)  



                                                  .? No records           



                                                  are available for           



                                                  review              

 

           (unknown)  (no       (unknown)  (unknown)  Inspection: normal  (units

    (unknown)



                    date)                         to inspection unknown)  

 

           (unknown)  (no       (unknown)  (unknown)  Intake    (units    (unkno

wn)



                    date)                                   unknown)  

 

           (unknown)  (no       (unknown)  (unknown)  Irregular menstrual  (unit

s    (unknown)



                    date)                         cycle () unknown)  

 

           (unknown)  (no       (unknown)  (unknown)  Judgment: judgment  (units

    (unknown)



                    date)                         good      unknown)  

 

           (unknown)  (no       (unknown)  (unknown)  Last Menstural  (units    

(unknown)



                    date)                         Cycle + Details unknown)  

 

           (unknown)  (no       (unknown)  (unknown)  Loc: [facility]  (units    (unkno

wn)



                    date)                                   unknown)  

 

           (unknown)  (no       (unknown)  (unknown)  Lymphangioma  (units    (u

nknown)



                    date)                                   unknown)  

 

           (unknown)  (no       (unknown)  (unknown)  Medical History  (units   

 (unknown)



                    date)                         (Reviewed 22 @ unknown) 

 



                                                  08:13 by Alvarez Woodson RN)           

 

           (unknown)  (no       (unknown)  (unknown)  Medications  (units    (un

known)



                    date)                                   unknown)  

 

           (unknown)  (no       (unknown)  (unknown)  Mental Status:  (units    

(unknown)



                    date)                         mental status unknown)  



                                                  grossly normal           

 

           (unknown)  (no       (unknown)  (unknown)  Mood: congruent  (units   

 (unknown)



                    date)                         mood      unknown)  

 

           (unknown)  (no       (unknown)  (unknown)  Neck      (units    (unkno

wn)



                    date)                                   unknown)  

 

           (unknown)  (no       (unknown)  (unknown)  Neck: normal visual  (unit

s    (unknown)



                    date)                         inspection unknown)  

 

           (unknown)  (no       (unknown)  (unknown)  Nutritional  (units    (un

known)



                    date)                         Appearance: average unknown)  



                                                  body habitus           

 

           (unknown)  (no       (unknown)  (unknown)  OB/External +  (units    (

unknown)



                    date)                         Speculum: cervical unknown)  



                                                  os open             

 

           (unknown)  (no       (unknown)  (unknown)  Opioids - Morphine  (units

    (unknown)



                    date)                         Analogues Allergy unknown)  



                                                  (Intermediate,           



                                                  Verified 22           



                                                  16:10)              

 

           (unknown)  (no       (unknown)  (unknown)  Orders    (units    (unkno

wn)



                    date)                                   unknown)  

 

           (unknown)  (no       (unknown)  (unknown)  Orders:   (units    (unkno

wn)



                    date)                                   unknown)  

 

           (unknown)  (no       (unknown)  (unknown)  Orientation: alert  (units

    (unknown)



                    date)                         and oriented x3 unknown)  

 

           (unknown)  (no       (unknown)  (unknown)  Other Menstrual  (units   

 (unknown)



                    date)                         Period: Other unknown)  

 

           (unknown)  (no       (unknown)  (unknown)  Ovarian cyst  (units    (u

nknown)



                    date)                                   unknown)  

 

           (unknown)  (no       (unknown)  (unknown)  Oxygen Delivery  (units   

 (unknown)



                    date)                         Method simple mask unknown)  

 

           (unknown)  (no       (unknown)  (unknown)  PFSH      (units    (unkno

wn)



                    date)                                   unknown)  

 

           (unknown)  (no       (unknown)  (unknown)  Painful menstrual  (units 

   (unknown)



                    date)                         periods () unknown)  

 

           (unknown)  (no       (unknown)  (unknown)  Palpation: soft, no  (unit

s    (unknown)



                    date)                         hepatosplenomegaly unknown)  



                                                  and tender (Marked           



                                                  tenderness RLQ)           

 

           (unknown)  (no       (unknown)  (unknown)  Patient counseled  (units 

   (unknown)



                    date)                         regarding unknown)  



                                                  alternatives, risks,          

 



                                                  benefits, and           



                                                  potential           

 

           (unknown)  (no       (unknown)  (unknown)  Patient:  (units    (unkno

wn)



                    date)                         Kateryna Jenkins E unknown)  



                                                  MR#: [id number]             

 

           (unknown)  (no       (unknown)  (unknown)  Pelvic Support:  (units   

 (unknown)



                    date)                         normal    unknown)  

 

           (unknown)  (no       (unknown)  (unknown)  Plan      (units    (unkno

wn)



                    date)                                   unknown)  

 

           (unknown)  (no       (unknown)  (unknown)  Position Sitting  (units  

  (unknown)



                    date)                                   unknown)  

 

           (unknown)  (no       (unknown)  (unknown)  Problem-specific  (units  

  (unknown)



                    date)                         ROS positives unknown)  



                                                  included with the           



                                                  HPI                 

 

           (unknown)  (no       (unknown)  (unknown)  Psych     (units    (unkno

wn)



                    date)                                   unknown)  

 

           (unknown)  (no       (unknown)  (unknown)  Pulse 80  (units    (unkno

wn)



                    date)                                   unknown)  

 

           (unknown)  (no       (unknown)  (unknown)  Pulse Oximetry (%)  (units

    (unknown)



                    date)                         100       unknown)  

 

           (unknown)  (no       (unknown)  (unknown)  ROS Narrative  (units    (

unknown)



                    date)                                   unknown)  

 

           (unknown)  (no       (unknown)  (unknown)  ROS Narrative:  (units    

(unknown)



                    date)                                   unknown)  

 

           (unknown)  (no       (unknown)  (unknown)  ROS       (units    (unkno

wn)



                    date)                                   unknown)  

 

           (unknown)  (no       (unknown)  (unknown)  Rate: regular rate  (units

    (unknown)



                    date)                                   unknown)  

 

           (unknown)  (no       (unknown)  (unknown)  Reason For Visit  (units  

  (unknown)



                    date)                                   unknown)  

 

           (unknown)  (no       (unknown)  (unknown)  Resp      (units    (unkno

wn)



                    date)                                   unknown)  

 

           (unknown)  (no       (unknown)  (unknown)  Rhythm: regular  (units   

 (unknown)



                    date)                         rhythm    unknown)  

 

           (unknown)  (no       (unknown)  (unknown)  S/P ACL   (units    (unkno

wn)



                    date)                         reconstruction unknown)  



                                                  (-21)           

 

           (unknown)  (no       (unknown)  (unknown)  Sclera: sclerae  (units   

 (unknown)



                    date)                         normal    unknown)  

 

           (unknown)  (no       (unknown)  (unknown)  Signed By:  (units    (unk

nown)



                    date)                                   unknown)  

 

           (unknown)  (no       (unknown)  (unknown)  Smoking Status:  (units   

 (unknown)



                    date)                         Never smoker unknown)  

 

           (unknown)  (no       (unknown)  (unknown)  Speculum Exam -  (units   

 (unknown)



                    date)                         Cervix: normal unknown)  



                                                  appearance of the           



                                                  cervix and cervical           



                                                  os open             

 

           (unknown)  (no       (unknown)  (unknown)  Speculum Exam -  (units   

 (unknown)



                    date)                         Vagina: normal unknown)  



                                                  appearance of the           



                                                  vagina and normal           



                                                  vaginal             

 

           (unknown)  (no       (unknown)  (unknown)  Speculum Exam:  (units    

(unknown)



                    date)                         cervical os open unknown)  

 

           (unknown)  (no       (unknown)  (unknown)  Speech and  (units    (unk

nown)



                    date)                         Movement: speech and unknown) 

 



                                                  movement normal           

 

           (unknown)  (no       (unknown)  (unknown)  Status: Acute  (units    (

unknown)



                    date)                                   unknown)  

 

           (unknown)  (no       (unknown)  (unknown)  Surgical History  (units  

  (unknown)



                    date)                         (Reviewed 22 @ unknown) 

 



                                                  08:13 by Alvarez Woodson RN)           

 

           (unknown)  (no       (unknown)  (unknown)  TOA (tubo-ovarian  (units 

   (unknown)



                    date)                         abscess) (-21) unknown) 

 

 

           (unknown)  (no       (unknown)  (unknown)  Temp 98.7 F  (units    (un

known)



                    date)                                   unknown)  

 

           (unknown)  (no       (unknown)  (unknown)  This note may have  (units

    (unknown)



                    date)                         been all or unknown)  



                                                  partially generated           



                                                  using voice           



                                                  recognition           

 

           (unknown)  (no       (unknown)  (unknown)  Thought Content:  (units  

  (unknown)



                    date)                         normal    unknown)  

 

           (unknown)  (no       (unknown)  (unknown)  Thought Process:  (units  

  (unknown)



                    date)                         normal    unknown)  

 

           (unknown)  (no       (unknown)  (unknown)  Tobacco + Substance  (unit

s    (unknown)



                    date)                         Use       unknown)  

 

           (unknown)  (no       (unknown)  (unknown)  Tobacco Status  (units    

(unknown)



                    date)                                   unknown)  

 

           (unknown)  (no       (unknown)  (unknown)  Urethra: normal  (units   

 (unknown)



                    date)                         appearance of the unknown)  



                                                  urethra             

 

           (unknown)  (no       (unknown)  (unknown)  Visit Reasons: Pre  (units

    (unknown)



                    date)                         op w/covid test? unknown)  

 

           (unknown)  (no       (unknown)  (unknown)  Vitals    (units    (unkno

wn)



                    date)                                   unknown)  

 

           (unknown)  (no       (unknown)  (unknown)  Weight 148 lb  (units    (

unknown)



                    date)                                   unknown)  

 

           (unknown)  (no       (unknown)  (unknown)  and therefore  (units    (

unknown)



                    date)                         discontinue the oral unknown) 

 



                                                  antibiotics.? As a           



                                                  result she only had           

 

           (unknown)  (no       (unknown)  (unknown)  bilaterally  (units    (un

known)



                    date)                                   unknown)  

 

           (unknown)  (no       (unknown)  (unknown)  clindamycin HCl 150  (unit

s    (unknown)



                    date)                         mg capsule 150 mg PO unknown) 

 



                                                  DAILY #30 caps           



                                                  22 [Rx           



                                                  Confirmed           

 

           (unknown)  (no       (unknown)  (unknown)  complications  (units    (

unknown)



                    date)                         associated with unknown)  



                                                  diagnostic           



                                                  laparoscopy with           



                                                  possible right           

 

           (unknown)  (no       (unknown)  (unknown)  considering all  (units   

 (unknown)



                    date)                         options, the patient unknown) 

 



                                                  is proceeding to           



                                                  diagnostic           



                                                  laparoscopy           

 

           (unknown)  (no       (unknown)  (unknown)  counseling, and  (units   

 (unknown)



                    date)                         consent.  unknown)  

 

           (unknown)  (no       (unknown)  (unknown)  discharge  (units    (unkn

own)



                    date)                                   unknown)  

 

           (unknown)  (no       (unknown)  (unknown)  doxycycline Adverse  (unit

s    (unknown)



                    date)                         Reaction  unknown)  



                                                  (Intermediate,           



                                                  Verified 22           



                                                  16:10)              

 

           (unknown)  (no       (unknown)  (unknown)  follow-up from an  (units 

   (unknown)



                    date)                         [facility]           



                                                  [facility] at which           

 

           (unknown)  (no       (unknown)  (unknown)  following IV  (units    (u

nknown)



                    date)                         antibiotics but unknown)  



                                                  apparently had an           



                                                  adverse reaction to           



                                                  Vibramycin           

 

           (unknown)  (no       (unknown)  (unknown)  has had only very  (units 

   (unknown)



                    date)                         irregular cycles unknown)  



                                                  since the TOA or as           



                                                  she had regular           

 

           (unknown)  (no       (unknown)  (unknown)  have occurred. If  (units 

   (unknown)



                    date)                         there are any unknown)  



                                                  questions, please           



                                                  contact the Medical           



                                                  Records             

 

           (unknown)  (no       (unknown)  (unknown)  incapacitating  (units    

(unknown)



                    date)                         pain. She presents unknown)  



                                                  today for her           



                                                  preoperative           



                                                  evaluation,           

 

           (unknown)  (no       (unknown)  (unknown)  incompletely  (units    (u

nknown)



                    date)                         treated TOA didn't unknown)  



                                                  provide any           



                                                  improvement in her           



                                                  pain and after           

 

           (unknown)  (no       (unknown)  (unknown)  intravenous  (units    (un

known)



                    date)                         antibiotic therapy unknown)  



                                                  and no follow-up           



                                                  oral antibiotic           



                                                  therapy.? Since           

 

           (unknown)  (no       (unknown)  (unknown)  laboratory  (units    (unk

nown)



                    date)                         examination, Z20.822 unknown) 

 



                                                  - Contact with and           



                                                  (suspected) exposure          

 



                                                  to                  

 

           (unknown)  (no       (unknown)  (unknown)  may occur.  (units    (unk

nown)



                    date)                         Occasional unknown)  



                                                  wrong-word or           



                                                  'sound-alike'           



                                                  substitutions may           



                                                  have                

 

           (unknown)  (no       (unknown)  (unknown) necessitating  (units    (u

nknown)



                    date)                         evaluation/treatment unknown) 

 



                                                  by General surgery           



                                                  but that will not be          

 



                                                  known               

 

           (unknown)  (no       (unknown)  (unknown)  occurred due to the  (unit

s    (unknown)



                    date)                         inherent limitations unknown) 

 



                                                  of voice recognition          

 



                                                  software. Please           

 

           (unknown)  (no       (unknown)  (unknown)  of that   (units    (unkno

wn)



                    date)                         hospitalization but unknown)  



                                                  apparently she was           



                                                  treated with IV           



                                                  antibiotics and           

 

           (unknown)  (no       (unknown)  (unknown)  ovary. A two week  (units 

   (unknown)



                    date)                         trial of PO AB's for unknown) 

 



                                                  suspected chronic           



                                                  PID following           

 

           (unknown)  (no       (unknown)  (unknown)  oxycodone 5 mg  (units    

(unknown)



                    date)                         tablet 5 mg PO Q6H unknown)  



                                                  PRN pain #10 tabs           



                                                  22 [Rx           



                                                  Confirmed           

 

           (unknown)  (no       (unknown)  (unknown)  percutaneous  (units    (u

nknown)



                    date)                         drainage of the unknown)  



                                                  abscess.? She was           



                                                  placed on oral           



                                                  antibiotics           

 

           (unknown)  (no       (unknown)  (unknown)  predictable periods  (unit

s    (unknown)



                    date)                         up until that time.? unknown) 

 



                                                  She presented in           



                                                  consultation in           

 

           (unknown)  (no       (unknown)  (unknown) promethazine 25 mg  (units 

   (unknown)



                    date)                         tablet 25 mg PO Q6H unknown)  



                                                  PRN 22           



                                                  [History Confirmed           



                                                  22]           

 

           (unknown)  (no       (unknown)  (unknown)  rash      (units    (unkno

wn)



                    date)                                   unknown)  

 

           (unknown)  (no       (unknown)  (unknown)  read the note  (units    (

unknown)



                    date)                         carefully and unknown)  



                                                  recognize, using           



                                                  context, where these          

 



                                                  substitutions           

 

           (unknown)  (no       (unknown)  (unknown)  right adnexa)  (units    (

unknown)



                    date)                         tender and soft on unknown)  



                                                  the right           

 

           (unknown)  (no       (unknown)  (unknown)  salpingo-oophorecto  (unit

s    (unknown)



                    date)                         my. Patient also unknown)  



                                                  understands there           



                                                  may be bowel           



                                                  involvement           

 

           (unknown)  (no       (unknown)  (unknown)  sentences  (units    (unkn

own)



                    date)                                   unknown)  

 

           (unknown)  (no       (unknown)  (unknown)  software. Although  (units

    (unknown)



                    date)                         every effort is made unknown) 

 



                                                  to edit content,           



                                                  transcription errors          

 

 

           (unknown)  (no       (unknown)  (unknown) that time she is had  (unit

s    (unknown)



                    date)                         persistent right unknown)  



                                                  lower quadrant pain           



                                                  which is noncyclic           



                                                  and                 

 

           (unknown)  (no       (unknown)  (unknown)  time she underwent  (units

    (unknown)



                    date)                         abdominal/pelvic CT unknown)  



                                                  and pelvic           



                                                  ultrasound which           



                                                  were largely           

 

           (unknown)  (no       (unknown)  (unknown)  tubo-ovarian  (units    (u

nknown)



                    date)                         abscess due to unknown)  



                                                  chlamydia treated as          

 



                                                  an inpatient at           



                                                  [facility]             

 

           (unknown)  (no       (unknown)  (unknown)  unremarkable with  (units 

   (unknown)



                    date)                         the exception of a 3 unknown) 

 



                                                  cm hemorrhagic cyst           



                                                  within the right           

 

           (unknown)  (no       (unknown)  (unknown)  until the procedure  (unit

s    (unknown)



                    date)                         itself and the unknown)  



                                                  findings at that           



                                                  time.               

 

           (unknown)  (no       (unknown)  (unknown)  urethra   (units    (unkno

wn)



                    date)                                   unknown)  

 

           (unknown)  (no       (unknown)  (unknown)  vomitt    (units    (unkno

wn)



                    date)                                   unknown)  

 

           (unknown)  (no       (unknown)  (unknown)  with possible right  (unit

s    (unknown)



                    date)                         salpingo-oophorectom unknown) 

 



                                                  y for persistent and          

 



                                                  at times            









                                         Result panel 23









           (unknown)  (no       (unknown)  (unknown)  (no value)  (units    (unk

nown)



                    date)                                   unknown)  

 

           (unknown)  (no       (unknown)  (unknown)  (1) Pelvic pain:  (units  

  (unknown)



                    date)                                   unknown)  

 

           (unknown)  (no       (unknown)  (unknown)  (2) Chronic right  (units 

   (unknown)



                    date)                         lower quadrant pain: unknown) 

 

 

           (unknown)  (no       (unknown)  (unknown)  [id number]  (units    (unkno

wn)



                    date)                                   unknown)  

 

           (unknown)  (no       (unknown)  (unknown)  22 1704  (units    (

unknown)



                    date)                                   unknown)  

 

           (unknown)  (no       (unknown)  (unknown)  22  (units    (unkno

wn)



                    date)                                   unknown)  

 

           (unknown)  (no       (unknown)  (unknown)  22]  (units    (unkn

own)



                    date)                                   unknown)  

 

           (unknown)  (no       (unknown)  (unknown)  16:12     (units    (unkno

wn)



                    date)                                   unknown)  

 

           (unknown)  (no       (unknown)  (unknown)   with an 8  (units    

(unknown)



                    date)                         month history of unknown)  



                                                  right lower quadrant          

 



                                                  pain following           



                                                  right-sided           

 

           (unknown)  (no       (unknown)  (unknown)  Acne (-2016)  (units    (u

nknown)



                    date)                                   unknown)  

 

           (unknown)  (no       (unknown)  (unknown)  Affect: normal  (units    

(unknown)



                    date)                         affect    unknown)  

 

           (unknown)  (no       (unknown)  (unknown)  Age/Sex: 17 / F  (units   

 (unknown)



                    date)                         Date of Service: unknown)  

 

           (unknown)  (no       (unknown)  (unknown)  Allergies  (units    (unkn

own)



                    date)                                   unknown)  

 

           (unknown)  (no       (unknown)  (unknown)  [place] [address]  (unit

s    (unknown)



                    date)                                   unknown)  

 

           (unknown)  (no       (unknown)  (unknown)  Anesthesia  (units    (unk

nown)



                    date)                                   unknown)  

 

           (unknown)  (no       (unknown)  (unknown)  Appearance: grossly  (unit

s    (unknown)



                    date)                         normal    unknown)  

 

           (unknown)  (no       (unknown)  (unknown)  Assessment + Plan  (units 

   (unknown)



                    date)                                   unknown)  

 

           (unknown)  (no       (unknown)  (unknown)  Attending Dr:  (units    (

unknown)



                    date)                         Jose Almodovar MD unknown)  

 

           (unknown)  (no       (unknown)  (unknown)  Attitude:  (units    (unkn

own)



                    date)                         cooperative unknown)  

 

           (unknown)  (no       (unknown)  (unknown)  Auscultation: clear  (unit

s    (unknown)



                    date)                         to auscultation unknown)  



                                                  bilaterally           

 

           (unknown)  (no       (unknown)  (unknown)  BMI 25.8  (units    (unkno

wn)



                    date)                                   unknown)  

 

           (unknown)  (no       (unknown)  (unknown)  /58 L  (units    (un

known)



                    date)                                   unknown)  

 

           (unknown)  (no       (unknown)  (unknown) Bimanual Exam-  (units    (

unknown)



                    date)                         Adnexa, other: unknown)  



                                                  normal, tender           



                                                  (Marked, right) and           



                                                  mass (Fullness,           

 

           (unknown)  (no       (unknown)  (unknown)  Bimanual Exam-  (units    

(unknown)



                    date)                         Vagina + Uterus: unknown)  



                                                  uterine size normal           



                                                  and tender (Mild)           

 

           (unknown)  (no       (unknown)  (unknown)  Blood Pressure  (units    

(unknown)



                    date)                         Location Rt brachial unknown) 

 

 

           (unknown)  (no       (unknown)  (unknown)  COVID-19  (units    (unkno

wn)



                    date)                                   unknown)  

 

           (unknown)  (no       (unknown)  (unknown)  COVID19 -Nasal  (units    

(unknown)



                    date)                         RAPID/Pre-Proc Today unknown) 

 



                                                  Z01.812 - Encounter           



                                                  for preprocedural           

 

           (unknown)  (no       (unknown)  (unknown)  Cardio    (units    (unkno

wn)



                    date)                                   unknown)  

 

           (unknown)  (no       (unknown)  (unknown)  Chief Complaint  (units   

 (unknown)



                    date)                                   unknown)  

 

           (unknown)  (no       (unknown)  (unknown)  Chief Complaint:  (units  

  (unknown)



                    date)                         Pre-op visit unknown)  

 

           (unknown)  (no       (unknown)  (unknown)  Chlamydia infection  (unit

s    (unknown)



                    date)                         ()   unknown)  

 

           (unknown)  (no       (unknown)  (unknown)  Conjunctivae:  (units    (

unknown)



                    date)                         conjunctivae normal unknown)  

 

           (unknown)  (no       (unknown)  (unknown)  Const     (units    (unkno

wn)



                    date)                                   unknown)  

 

           (unknown)  (no       (unknown)  (unknown)  Counseling and  (units    

(unknown)



                    date)                         educating the unknown)  



                                                  patient/family/careg          

 



                                                  iver: 5             

 

           (unknown)  (no       (unknown)  (unknown)  : 2005  (units   

 (unknown)



                    date)                         Acct:[id number] unknown)  

 

           (unknown)  (no       (unknown)  (unknown)  Dept at   (units    (o

wn)



                    date)                         [phone] unknown)  

 

           (unknown)  (no       (unknown)  (unknown)  Details:  (units    (o

wn)



                    date)                                   unknown)  

 

           (unknown)  (no       (unknown)  (unknown)  Documented By:  (units    

(unknown)



                    date)                         Jose Almodovar MD unknown)  



                                                  22 1610           

 

           (unknown)  (no       (unknown)  (unknown)  Documenting  (units    (un

known)



                    date)                         clinical information unknown) 

 



                                                  in EHR/Medical           



                                                  record: 10           

 

           (unknown)  (no       (unknown)  (unknown)  EOM: EOM intact  (units   

 (unknown)



                    date)                         bilaterally unknown)  

 

           (unknown)  (no       (unknown)  (unknown)  Ears: hearing  (units    (

unknown)



                    date)                         grossly normal unknown)  



                                                  bilaterally           

 

           (unknown)  (no       (unknown)  (unknown)  Effort +  (units    (unkno

wn)



                    date)                         Inspection: normal unknown)  



                                                  respiratory effort           



                                                  and able to speak in          

 



                                                  complete            

 

           (unknown)  (no       (unknown)  (unknown)  Endometriosis  (units    (

unknown)



                    date)                         ()   unknown)  

 

           (unknown)  (no       (unknown)  (unknown)  Exam      (units    (unkno

wn)



                    date)                                   unknown)  

 

           (unknown)  (no       (unknown)  (unknown)  External Female  (units   

 (unknown)



                    date)                         Exam: normal unknown)  



                                                  external appearance           



                                                  and normal           



                                                  appearance of the           

 

           (unknown)  (no       (unknown)  (unknown)  Eyes      (units    (unkno

wn)



                    date)                                   unknown)  

 

           (unknown)  (no       (unknown)  (unknown)  Face and sinus:  (units   

 (unknown)



                    date)                         face symmetric unknown)  

 

           (unknown)  (no       (unknown)  (unknown)  [facility]  (units   

 (unknown)



                    date)                         [facility] unknown)  

 

           (unknown)  (no       (unknown)  (unknown)  GI        (units    (unkno

wn)



                    date)                                   unknown)  

 

           (unknown)  (no       (unknown)  (unknown)          (units    (unkno

wn)



                    date)                                   unknown)  

 

           (unknown)  (no       (unknown)  (unknown)  General: appearance  (unit

s    (unknown)



                    date)                         normal, both eyes unknown)  



                                                  and all related           



                                                  structures           

 

           (unknown)  (no       (unknown)  (unknown)  General:  (units    (unkno

wn)



                    date)                         cooperative, unknown)  



                                                  comfortable and no           



                                                  acute distress           

 

           (unknown)  (no       (unknown)  (unknown)  Kateryna is a  (units    (unk

nown)



                    date)                         17-year-old unknown)  



                                                  nulligravida, LMP in          

 



                                                  May 2022 who           



                                                  presented in August           

 

           (unknown)  (no       (unknown)  (unknown)  Gynecology Visit  (units  

  (unknown)



                    date)                                   unknown)  

 

           (unknown)  (no       (unknown)  (unknown)  HENMT     (units    (unkno

wn)



                    date)                                   unknown)  

 

           (unknown)  (no       (unknown)  (unknown)  HPI       (units    (unkno

wn)



                    date)                                   unknown)  

 

           (unknown)  (no       (unknown)  (unknown)  Head: normal to  (units   

 (unknown)



                    date)                         inspection, unknown)  



                                                  normocephalic and           



                                                  atraumatic           

 

           (unknown)  (no       (unknown)  (unknown)  Heart Sounds: S1  (units  

  (unknown)



                    date)                         normal, S2 normal unknown)  



                                                  and no murmurs           

 

           (unknown)  (no       (unknown)  (unknown)  Heavy menstrual  (units   

 (unknown)



                    date)                         period (-) unknown)  

 

           (unknown)  (no       (unknown)  (unknown)  Height 5 ft 3.5 in  (units

    (unknown)



                    date)                                   unknown)  

 

           (unknown)  (no       (unknown)  (unknown)  [facility]  (unit

s    (unknown)



                    date)                         back in December unknown)  



                                                  .? No records           



                                                  are available for           



                                                  review              

 

           (unknown)  (no       (unknown)  (unknown)  Inspection: normal  (units

    (unknown)



                    date)                         to inspection unknown)  

 

           (unknown)  (no       (unknown)  (unknown)  Intake    (units    (unkno

wn)



                    date)                                   unknown)  

 

           (unknown)  (no       (unknown)  (unknown)  Irregular menstrual  (unit

s    (unknown)



                    date)                         cycle () unknown)  

 

           (unknown)  (no       (unknown)  (unknown)  Judgment: judgment  (units

    (unknown)



                    date)                         good      unknown)  

 

           (unknown)  (no       (unknown)  (unknown)  Last Menstural  (units    

(unknown)



                    date)                         Cycle + Details unknown)  

 

           (unknown)  (no       (unknown)  (unknown)  Loc: [facility]  (units    (unkno

wn)



                    date)                                   unknown)  

 

           (unknown)  (no       (unknown)  (unknown)  Lymphangioma  (units    (u

nknown)



                    date)                                   unknown)  

 

           (unknown)  (no       (unknown)  (unknown)  Medical History  (units   

 (unknown)



                    date)                         (Reviewed 22 @ unknown) 

 



                                                  08:13 by Alvarez Woodson RN)           

 

           (unknown)  (no       (unknown)  (unknown)  Medications  (units    (un

known)



                    date)                                   unknown)  

 

           (unknown)  (no       (unknown)  (unknown)  Mental Status:  (units    

(unknown)



                    date)                         mental status unknown)  



                                                  grossly normal           

 

           (unknown)  (no       (unknown)  (unknown)  Mood: congruent  (units   

 (unknown)



                    date)                         mood      unknown)  

 

           (unknown)  (no       (unknown)  (unknown)  Neck      (units    (unkno

wn)



                    date)                                   unknown)  

 

           (unknown)  (no       (unknown)  (unknown)  Neck: normal visual  (unit

s    (unknown)



                    date)                         inspection unknown)  

 

           (unknown)  (no       (unknown)  (unknown)  Nutritional  (units    (un

known)



                    date)                         Appearance: average unknown)  



                                                  body habitus           

 

           (unknown)  (no       (unknown)  (unknown)  OB/External +  (units    (

unknown)



                    date)                         Speculum: cervical unknown)  



                                                  os open             

 

           (unknown)  (no       (unknown)  (unknown)  Obtaining and/or  (units  

  (unknown)



                    date)                         reviewing separately unknown) 

 



                                                  obtained history: 5           

 

           (unknown)  (no       (unknown)  (unknown)  Opioids - Morphine  (units

    (unknown)



                    date)                         Analogues Allergy unknown)  



                                                  (Intermediate,           



                                                  Verified 22           



                                                  16:10)              

 

           (unknown)  (no       (unknown)  (unknown)  Orders    (units    (unkno

wn)



                    date)                                   unknown)  

 

           (unknown)  (no       (unknown)  (unknown)  Orders:   (units    (unkno

wn)



                    date)                                   unknown)  

 

           (unknown)  (no       (unknown)  (unknown)  Orientation: alert  (units

    (unknown)



                    date)                         and oriented x3 unknown)  

 

           (unknown)  (no       (unknown)  (unknown)  Other Menstrual  (units   

 (unknown)



                    date)                         Period: Other unknown)  

 

           (unknown)  (no       (unknown)  (unknown)  Ovarian cyst  (units    (u

nknown)



                    date)                                   unknown)  

 

           (unknown)  (no       (unknown)  (unknown)  Oxygen Delivery  (units   

 (unknown)



                    date)                         Method simple mask unknown)  

 

           (unknown)  (no       (unknown)  (unknown)  PFSH      (units    (unkno

wn)



                    date)                                   unknown)  

 

           (unknown)  (no       (unknown)  (unknown)  Painful menstrual  (units 

   (unknown)



                    date)                         periods (-) unknown)  

 

           (unknown)  (no       (unknown)  (unknown)  Palpation: soft, no  (unit

s    (unknown)



                    date)                         hepatosplenomegaly unknown)  



                                                  and tender (Marked           



                                                  tenderness RLQ)           

 

           (unknown)  (no       (unknown)  (unknown)  Patient counseled  (units 

   (unknown)



                    date)                         regarding unknown)  



                                                  alternatives, risks,          

 



                                                  benefits, and           



                                                  potential           

 

           (unknown)  (no       (unknown)  (unknown)  Patient:  (units    (unkno

wn)



                    date)                         Kateryna Jenkins unknown)  



                                                  MR#: [id number]             

 

           (unknown)  (no       (unknown)  (unknown)  Pelvic Support:  (units   

 (unknown)



                    date)                         normal    unknown)  

 

           (unknown)  (no       (unknown)  (unknown)  Performing a  (units    (u

nknown)



                    date)                         medically unknown)  



                                                  appropriate exam           



                                                  and/or evaluation: 5          

 

 

           (unknown)  (no       (unknown)  (unknown)  Plan      (units    (unkno

wn)



                    date)                                   unknown)  

 

           (unknown)  (no       (unknown)  (unknown)  Position Sitting  (units  

  (unknown)



                    date)                                   unknown)  

 

           (unknown)  (no       (unknown)  (unknown)  Preparing to see  (units  

  (unknown)



                    date)                         the patient, i.e., unknown)  



                                                  chart review, review          

 



                                                  of tests: 5           

 

           (unknown)  (no       (unknown)  (unknown)  Problem-specific  (units  

  (unknown)



                    date)                         ROS positives unknown)  



                                                  included with the           



                                                  HPI                 

 

           (unknown)  (no       (unknown)  (unknown)  Psych     (units    (unkno

wn)



                    date)                                   unknown)  

 

           (unknown)  (no       (unknown)  (unknown)  Pulse 80  (units    (unkno

wn)



                    date)                                   unknown)  

 

           (unknown)  (no       (unknown)  (unknown)  Pulse Oximetry (%)  (units

    (unknown)



                    date)                         100       unknown)  

 

           (unknown)  (no       (unknown)  (unknown)  ROS Narrative  (units    (

unknown)



                    date)                                   unknown)  

 

           (unknown)  (no       (unknown)  (unknown)  ROS Narrative:  (units    

(unknown)



                    date)                                   unknown)  

 

           (unknown)  (no       (unknown)  (unknown)  ROS       (units    (unkno

wn)



                    date)                                   unknown)  

 

           (unknown)  (no       (unknown)  (unknown)  Rate: regular rate  (units

    (unknown)



                    date)                                   unknown)  

 

           (unknown)  (no       (unknown)  (unknown)  Reason For Visit  (units  

  (unknown)



                    date)                                   unknown)  

 

           (unknown)  (no       (unknown)  (unknown)  Resp      (units    (unkno

wn)



                    date)                                   unknown)  

 

           (unknown)  (no       (unknown)  (unknown)  Rhythm: regular  (units   

 (unknown)



                    date)                         rhythm    unknown)  

 

           (unknown)  (no       (unknown)  (unknown)  S/P ACL   (units    (unkno

wn)



                    date)                         reconstruction unknown)  



                                                  (-21)           

 

           (unknown)  (no       (unknown)  (unknown)  Sclera: sclerae  (units   

 (unknown)



                    date)                         normal    unknown)  

 

           (unknown)  (no       (unknown)  (unknown)  Signed By:  (units    (unk

nown)



                    date)                         <Electronically unknown)  



                                                  signed by Jose Almodovar MD>           

 

           (unknown)  (no       (unknown)  (unknown)  Signed    (units    (unkno

wn)



                    date)                                   unknown)  

 

           (unknown)  (no       (unknown)  (unknown)  Smoking Status:  (units   

 (unknown)



                    date)                         Never smoker unknown)  

 

           (unknown)  (no       (unknown)  (unknown)  Speculum Exam -  (units   

 (unknown)



                    date)                         Cervix: normal unknown)  



                                                  appearance of the           



                                                  cervix and cervical           



                                                  os open             

 

           (unknown)  (no       (unknown)  (unknown)  Speculum Exam -  (units   

 (unknown)



                    date)                         Vagina: normal unknown)  



                                                  appearance of the           



                                                  vagina and normal           



                                                  vaginal             

 

           (unknown)  (no       (unknown)  (unknown)  Speculum Exam:  (units    

(unknown)



                    date)                         cervical os open unknown)  

 

           (unknown)  (no       (unknown)  (unknown)  Speech and  (units    (unk

nown)



                    date)                         Movement: speech and unknown) 

 



                                                  movement normal           

 

           (unknown)  (no       (unknown)  (unknown)  Status: Acute  (units    (

unknown)



                    date)                                   unknown)  

 

           (unknown)  (no       (unknown)  (unknown)  Surgical History  (units  

  (unknown)



                    date)                         (Reviewed 22 @ unknown) 

 



                                                  08:13 by Alvarez Woodson RN)           

 

           (unknown)  (no       (unknown)  (unknown)  TOA (tubo-ovarian  (units 

   (unknown)



                    date)                         abscess) (-21) unknown) 

 

 

           (unknown)  (no       (unknown)  (unknown)  Temp 98.7 F  (units    (un

known)



                    date)                                   unknown)  

 

           (unknown)  (no       (unknown)  (unknown)  This note may have  (units

    (unknown)



                    date)                         been all or unknown)  



                                                  partially generated           



                                                  using voice           



                                                  recognition           

 

           (unknown)  (no       (unknown)  (unknown)  Thought Content:  (units  

  (unknown)



                    date)                         normal    unknown)  

 

           (unknown)  (no       (unknown)  (unknown)  Thought Process:  (units  

  (unknown)



                    date)                         normal    unknown)  

 

           (unknown)  (no       (unknown)  (unknown)  Time Coding Minutes  (unit

s    (unknown)



                    date)                         Spent: (must be on unknown)  



                                                  same date of           



                                                  service/appointment)          

 

 

           (unknown)  (no       (unknown)  (unknown)  Time Spent  (units    (unk

nown)



                    date)                                   unknown)  

 

           (unknown)  (no       (unknown)  (unknown)  Tobacco + Substance  (unit

s    (unknown)



                    date)                         Use       unknown)  

 

           (unknown)  (no       (unknown)  (unknown)  Tobacco Status  (units    

(unknown)



                    date)                                   unknown)  

 

           (unknown)  (no       (unknown)  (unknown)  Total Time: 30  (units    

(unknown)



                    date)                                   unknown)  

 

           (unknown)  (no       (unknown)  (unknown)  Urethra: normal  (units   

 (unknown)



                    date)                         appearance of the unknown)  



                                                  urethra             

 

           (unknown)  (no       (unknown)  (unknown)  Visit Reasons: Pre  (units

    (unknown)



                    date)                         op w/covid test? unknown)  

 

           (unknown)  (no       (unknown)  (unknown)  Vitals    (units    (unkno

wn)



                    date)                                   unknown)  

 

           (unknown)  (no       (unknown)  (unknown)  Weight 148 lb  (units    (

unknown)



                    date)                                   unknown)  

 

           (unknown)  (no       (unknown)  (unknown)  With full  (units    (unkn

own)



                    date)                         understanding of the unknown) 

 



                                                  above, a written           



                                                  consent was           



                                                  executed, signed,           

 

           (unknown)  (no       (unknown)  (unknown)  and therefore  (units    (

unknown)



                    date)                         discontinue the oral unknown) 

 



                                                  antibiotics.? As a           



                                                  result she only had           

 

           (unknown)  (no       (unknown)  (unknown)  and witnessed this  (units

    (unknown)



                    date)                         date.     unknown)  

 

           (unknown)  (no       (unknown)  (unknown)  ay occur.  (units    (unkn

own)



                    date)                         Occasional unknown)  



                                                  wrong-word or           



                                                  'sound-alike'           



                                                  substitutions may           



                                                  have                

 

           (unknown)  (no       (unknown)  (unknown)  bilaterally  (units    (un

known)



                    date)                                   unknown)  

 

           (unknown)  (no       (unknown)  (unknown) case will not be  (units   

 (unknown)



                    date)                         known until the unknown)  



                                                  procedure itself and          

 



                                                  the findings at that          

 



                                                  time.               

 

           (unknown)  (no       (unknown)  (unknown)  clindamycin HCl 150  (unit

s    (unknown)



                    date)                         mg capsule 150 mg PO unknown) 

 



                                                  DAILY #30 caps           



                                                  22 [Rx           



                                                  Confirmed           

 

           (unknown)  (no       (unknown)  (unknown)  complications  (units    (

unknown)



                    date)                         associated with unknown)  



                                                  diagnostic           



                                                  laparoscopy with           



                                                  possible right           

 

           (unknown)  (no       (unknown)  (unknown)  considering all  (units   

 (unknown)



                    date)                         options, the patient unknown) 

 



                                                  is proceeding to           



                                                  diagnostic           



                                                  laparoscopy           

 

           (unknown)  (no       (unknown)  (unknown)  counseling, and  (units   

 (unknown)



                    date)                         consent.  unknown)  

 

           (unknown)  (no       (unknown)  (unknown)  discharge  (units    (unkn

own)



                    date)                                   unknown)  

 

           (unknown)  (no       (unknown)  (unknown)  doxycycline Adverse  (unit

s    (unknown)



                    date)                         Reaction  unknown)  



                                                  (Intermediate,           



                                                  Verified 22           



                                                  16:10)              

 

           (unknown)  (no       (unknown)  (unknown)  follow-up from an  (units 

   (unknown)



                    date)                         [facility]           



                                                  [facility] at which           

 

           (unknown)  (no       (unknown)  (unknown)  following IV  (units    (u

nknown)



                    date)                         antibiotics but unknown)  



                                                  apparently had an           



                                                  adverse reaction to           



                                                  Vibramycin           

 

           (unknown)  (no       (unknown)  (unknown)  has had only very  (units 

   (unknown)



                    date)                         irregular cycles unknown)  



                                                  since the TOA or as           



                                                  she had regular           

 

           (unknown)  (no       (unknown)  (unknown)  have occurred. If  (units 

   (unknown)



                    date)                         there are any unknown)  



                                                  questions, please           



                                                  contact the Medical           



                                                  Records             

 

           (unknown)  (no       (unknown)  (unknown)  incapacitating  (units    

(unknown)



                    date)                         pain. She presents unknown)  



                                                  today for her           



                                                  preoperative           



                                                  evaluation,           

 

           (unknown)  (no       (unknown)  (unknown)  incompletely  (units    (u

nknown)



                    date)                         treated TOA didn't unknown)  



                                                  provide any           



                                                  improvement in her           



                                                  pain and after           

 

           (unknown)  (no       (unknown)  (unknown)  intravenous  (units    (un

known)



                    date)                         antibiotic therapy unknown)  



                                                  and no follow-up           



                                                  oral antibiotic           



                                                  therapy.? Since           

 

           (unknown)  (no       (unknown)  (unknown)  laboratory  (units    (unk

nown)



                    date)                         examination, Z20.822 unknown) 

 



                                                  - Contact with and           



                                                  (suspected) exposure          

 



                                                  to                  

 

           (unknown)  (no       (unknown)  (unknown)  necessitating  (units    (

unknown)



                    date)                         evaluation/treatment unknown) 

 



                                                  by General Surgery           



                                                  but whether that is           



                                                  the                 

 

           (unknown)  (no       (unknown)  (unknown)  occurred due to the  (unit

s    (unknown)



                    date)                         inherent limitations unknown) 

 



                                                  of voice recognition          

 



                                                  software. Please           

 

           (unknown)  (no       (unknown)  (unknown)  of that   (units    (unkno

wn)



                    date)                         hospitalization but unknown)  



                                                  apparently she was           



                                                  treated with IV           



                                                  antibiotics and           

 

           (unknown)  (no       (unknown)  (unknown)  ovary. A two week  (units 

   (unknown)



                    date)                         trial of PO AB's for unknown) 

 



                                                  suspected chronic           



                                                  PID following           

 

           (unknown)  (no       (unknown)  (unknown)  oxycodone 5 mg  (units    

(unknown)



                    date)                         tablet 5 mg PO Q6H unknown)  



                                                  PRN pain #10 tabs           



                                                  22 [Rx           



                                                  Confirmed           

 

           (unknown)  (no       (unknown)  (unknown)  percutaneous  (units    (u

nknown)



                    date)                         drainage of the unknown)  



                                                  abscess.? She was           



                                                  placed on oral           



                                                  antibiotics           

 

           (unknown)  (no       (unknown)  (unknown)  predictable periods  (unit

s    (unknown)



                    date)                         up until that time.? unknown) 

 



                                                  She presented in           



                                                  consultation in           

 

           (unknown)  (no       (unknown)  (unknown) promethazine 25 mg  (units 

   (unknown)



                    date)                         tablet 25 mg PO Q6H unknown)  



                                                  PRN 22           



                                                  [History Confirmed           



                                                  22]           

 

           (unknown)  (no       (unknown)  (unknown)  rash      (units    (unkno

wn)



                    date)                                   unknown)  

 

           (unknown)  (no       (unknown)  (unknown)  read the note  (units    (

unknown)



                    date)                         carefully and unknown)  



                                                  recognize, using           



                                                  context, where these          

 



                                                  substitutions           

 

           (unknown)  (no       (unknown)  (unknown)  right adnexa)  (units    (

unknown)



                    date)                         tender and soft on unknown)  



                                                  the right           

 

           (unknown)  (no       (unknown)  (unknown)  salpingo-oophorecto  (unit

s    (unknown)



                    date)                         my. Patient also unknown)  



                                                  understands there           



                                                  may be bowel           



                                                  involvement           

 

           (unknown)  (no       (unknown)  (unknown)  sentences  (units    (unkn

own)



                    date)                                   unknown)  

 

           (unknown)  (no       (unknown)  (unknown)  software. Although  (units

    (unknown)



                    date)                         every effort is made unknown) 

 



                                                  to edit content,           



                                                  transcription errors          

 



                                                  m                   

 

           (unknown)  (no       (unknown)  (unknown) that time she is had  (unit

s    (unknown)



                    date)                         persistent right unknown)  



                                                  lower quadrant pain           



                                                  which is noncyclic           



                                                  and                 

 

           (unknown)  (no       (unknown)  (unknown)  time she underwent  (units

    (unknown)



                    date)                         abdominal/pelvic CT unknown)  



                                                  and pelvic           



                                                  ultrasound which           



                                                  were largely           

 

           (unknown)  (no       (unknown)  (unknown)  tubo-ovarian  (units    (u

nknown)



                    date)                         abscess due to unknown)  



                                                  chlamydia treated as          

 



                                                  an inpatient at           



                                                  [facility]             

 

           (unknown)  (no       (unknown)  (unknown)  unremarkable with  (units 

   (unknown)



                    date)                         the exception of a 3 unknown) 

 



                                                  cm hemorrhagic cyst           



                                                  within the right           

 

           (unknown)  (no       (unknown)  (unknown)  urethra   (units    (unkno

wn)



                    date)                                   unknown)  

 

           (unknown)  (no       (unknown)  (unknown)  vomitt    (units    (unkno

wn)



                    date)                                   unknown)  

 

           (unknown)  (no       (unknown)  (unknown)  with possible right  (unit

s    (unknown)



                    date)                         salpingo-oophorectom unknown) 

 



                                                  y for persistent and          

 



                                                  at times            









                                         Result panel 24









           (unknown)  (no date)  (unknown)  (unknown)  Negative  (units    (unkn

own)



                                                            unknown)  

 

           (unknown)  (no date)  (unknown)  (unknown)  Negative  (units    (unkn

own)



                                                            unknown)  









                                         Result panel 25









           (unknown)  (no       (unknown)  (unknown)  (no value)  (units    (unk

nown)



                    date)                                   unknown)  

 

           (unknown)  (no       (unknown)  (unknown)  [id number]  (units    (unkno

wn)



                    date)                                   unknown)  

 

           (unknown)  (no       (unknown)  (unknown)  22 1355  (units    (

unknown)



                    date)                                   unknown)  

 

           (unknown)  (no       (unknown)  (unknown)  Age/Sex: 17 / F  (units   

 (unknown)



                    date)                                   unknown)  

 

           (unknown)  (no       (unknown)  (unknown)  COVID-19  (units    (unkno

wn)



                    date)                         status: Negative unknown)  

 

           (unknown)  (no       (unknown)  (unknown)  COVID-19  (units    (unkno

wn)



                    date)                                   unknown)  

 

           (unknown)  (no       (unknown)  (unknown)  Changes to H+P:  (units   

 (unknown)



                    date)                         No        unknown)  

 

           (unknown)  (no       (unknown)  (unknown)  Criteria for  (units    (u

nknown)



                    date)                         continued unknown)  



                                                  procedure:           



                                                  Non-surgical           



                                                  alternatives not           



                                                  available or           

 

           (unknown)  (no       (unknown)  (unknown)  : 2005  (units   

 (unknown)



                    date)                         Acct:[id number] unknown)  

 

           (unknown)  (no       (unknown)  (unknown)  Date of   (units    (unkno

wn)



                    date)                         Service:  unknown)  



                                                  22            

 

           (unknown)  (no       (unknown)  (unknown)  History +  (units    (unkn

own)



                    date)                         Physical  unknown)  



                                                  reviewed/Exam           



                                                  performed by           



                                                  Physician: Yes           

 

           (unknown)  (no       (unknown)  (unknown)  Interval Note  (units    (

unknown)



                    date)                                   unknown)  

 

           (unknown)  (no       (unknown)  (unknown)  [facility]  (units   

 (unknown)



                    date)                         [address] [place] [address] unknown)  



                                                  [place], [place]           



                                                  [address]               

 

           (unknown)  (no       (unknown)  (unknown)  Patient:  (units    (unkno

wn)



                    date)                         Kateryna Jenkins unknown)  



                                                  MR#: [id number]             

 

           (unknown)  (no       (unknown)  (unknown)  Pre-operative  (units    (

unknown)



                    date)                         Note      unknown)  

 

           (unknown)  (no       (unknown)  (unknown)  Provider:  (units    (unkn

own)



                    date)                         Jose Almodovar unknown)  



                                                  MD                  

 

           (unknown)  (no       (unknown)  (unknown)  Result    (units    (unkno

wn)



                    date)                         date/Date tested unknown)  



                                                  (Pos,               



                                                  Neg/Pending):           



                                                  22            

 

           (unknown)  (no       (unknown)  (unknown)  Signed    (units    (unkno

wn)



                    date)                         By:<Electronical unknown)  



                                                  ly signed by           



                                                  Jose Almodovar MD>                 

 

           (unknown)  (no       (unknown)  (unknown)  appropriate per  (units   

 (unknown)



                    date)                         current SOC unknown)  









                                         Result panel 26









           (unknown)  (no date)  (unknown)  (unknown)  (no value)  (units    (un

known)



                                                            unknown)  

 

           (unknown)  (no date)  (unknown)  (unknown)  [id number]  (units    (unkn

own)



                                                            unknown)  

 

           (unknown)  (no date)  (unknown)  (unknown)  Actual    (units    (unkn

own)



                                                  Procedure Side unknown)  



                                                  Surgeon             

 

           (unknown)  (no date)  (unknown)  (unknown)  Age/Sex: 17 /  (units    

(unknown)



                                                  F         unknown)  

 

           (unknown)  (no date)  (unknown)  (unknown)  Chronic pelvic  (units   

 (unknown)



                                                  pain      unknown)  

 

           (unknown)  (no date)  (unknown)  (unknown)  :      (units    (unkn

own)



                                                  2003 unknown)  



                                                  Acct:[id number]           

 

           (unknown)  (no date)  (unknown)  (unknown)  Date of   (units    (unkn

own)



                                                  Service:  unknown)  



                                                  22            

 

           (unknown)  (no date)  (unknown)  (unknown)  Date of   (units    (unkn

own)



                                                  procedure: unknown)  



                                                  22            

 

           (unknown)  (no date)  (unknown)  (unknown)  [facility]    (units    (unkn

own)



                                                  [facility] unknown)  



                                                  [address] [place] [address]           



                                                  [address]               

 

           (unknown)  (no date)  (unknown)  (unknown)  Operation  (units    (unk

nown)



                                                  Date: 22 unknown)  



                                                  13:15               

 

           (unknown)  (no date)  (unknown)  (unknown)  Operative  (units    (unk

nown)



                                                  Date/Time/Diagn unknown)  



                                                  oses                

 

           (unknown)  (no date)  (unknown)  (unknown)  Operative Note  (units   

 (unknown)



                                                            unknown)  

 

           (unknown)  (no date)  (unknown)  (unknown)  Patient:  (units    (unkn

own)



                                                  Kateryna Jenkins unknown)  



                                                  E MR#: [id number]           

 

           (unknown)  (no date)  (unknown)  (unknown)  Post-op   (units    (unkn

own)



                                                  diagnosis: unknown)  



                                                  other ([place];           



                                                  Extensive           



                                                  pelvic              



                                                  adhesions,           



                                                  right               



                                                  hydrosalpinx)           

 

           (unknown)  (no date)  (unknown)  (unknown)  Pre-op    (units    (unkn

own)



                                                  diagnosis: unknown)  



                                                  Chronic right           



                                                  lower quadrant           



                                                  pain                

 

           (unknown)  (no date)  (unknown)  (unknown)  Procedure +  (units    (u

nknown)



                                                  Clinicians unknown)  

 

           (unknown)  (no date)  (unknown)  (unknown)  Procedure:  (units    (un

known)



                                                            unknown)  

 

           (unknown)  (no date)  (unknown)  (unknown)  Procedures  (units    (un

known)



                                                            unknown)  

 

           (unknown)  (no date)  (unknown)  (unknown)  Provider:  (units    (unk

nown)



                                                  Jose Almodovar unknown)  



                                                  MD                  

 

           (unknown)  (no date)  (unknown)  (unknown)  Signed By:  (units    (un

known)



                                                            unknown)  

 

           (unknown)  (no date)  (unknown)  (unknown)  Time of   (units    (unkn

own)



                                                  procedure: unknown)  



                                                  14:15               

 

           (unknown)  (no date)  (unknown)  (unknown)  endometrial  (units    (u

nknown)



                                                  implants Jose unknown)  



                                                  ARPIT Almodovar MD           

 

           (unknown)  (no date)  (unknown)  (unknown)  p Laparoscopy  (units    

(unknown)



                                                  with poss. unknown)  



                                                  Right               



                                                  Salpingectomy,           



                                                  lysis of            



                                                  adhesions,           



                                                  fulgeration           









                                         Result panel 27









           (unknown)  (no       (unknown)  (unknown)  (no value)  (units    (unk

nown)



                    date)                                   unknown)  

 

           (unknown)  (no       (unknown)  (unknown)  [id number]  (units    (unkno

wn)



                    date)                                   unknown)  

 

           (unknown)  (no       (unknown)  (unknown)  22 1627  (units    (

unknown)



                    date)                                   unknown)  

 

           (unknown)  (no       (unknown)  (unknown)   with an 8  (units    

(unknown)



                    date)                         month history of unknown)  



                                                  right lower           



                                                  quadrant pain           



                                                  following           



                                                  right-sided           

 

           (unknown)  (no       (unknown)  (unknown)  5 mm bladeless  (units    

(unknown)



                    date)                         trocar and sleeve unknown)  



                                                  were then placed           



                                                  through the           



                                                  incision into the           

 

           (unknown)  (no       (unknown)  (unknown)  Actual Procedure  (units  

  (unknown)



                    date)                         Side Surgeon unknown)  

 

           (unknown)  (no       (unknown)  (unknown)  Age/Sex: 17 / F  (units   

 (unknown)



                    date)                                   unknown)  

 

           (unknown)  (no       (unknown)  (unknown)  Anesthesia Type:  (units  

  (unknown)



                    date)                         General   unknown)  

 

           (unknown)  (no       (unknown)  (unknown)  Applied: catheter  (units 

   (unknown)



                    date)                         (Straight) unknown)  

 

           (unknown)  (no       (unknown)  (unknown)  Appropriate  (units    (un

known)



                    date)                         photographically unknown)  



                                                  documentation was           



                                                  obtained. The right           



                                                  adnexa was           

 

           (unknown)  (no       (unknown)  (unknown)  Assistant: Angela  (units    

(unknown)



                    date)                         Skyla Quan unknown)  

 

           (unknown)  (no       (unknown)  (unknown)  Blood products  (units    

(unknown)



                    date)                         transfused: none unknown)  

 

           (unknown)  (no       (unknown)  (unknown)  Both pericolic  (units    

(unknown)



                    date)                         gutters are without unknown)  



                                                  adhesions. The           



                                                  appendix could not           



                                                  be                  

 

           (unknown)  (no       (unknown)  (unknown)  Chronic pelvic  (units    

(unknown)



                    date)                         pain      unknown)  

 

           (unknown)  (no       (unknown)  (unknown)  Closure Type:  (units    (

unknown)



                    date)                         primary   unknown)  

 

           (unknown)  (no       (unknown)  (unknown)  Complications:  (units    

(unknown)



                    date)                         none      unknown)  

 

           (unknown)  (no       (unknown)  (unknown)  Condition: stable  (units 

   (unknown)



                    date)                                   unknown)  

 

           (unknown)  (no       (unknown)  (unknown)  : 2005  (units   

 (unknown)



                    date)                         Acct:[id number] unknown)  

 

           (unknown)  (no       (unknown)  (unknown)  Date of Service:  (units  

  (unknown)



                    date)                         22  unknown)  

 

           (unknown)  (no       (unknown)  (unknown)  Date of procedure:  (units

    (unknown)



                    date)                         22  unknown)  

 

           (unknown)  (no       (unknown)  (unknown)  Disposition: PACU  (units 

   (unknown)



                    date)                                   unknown)  

 

           (unknown)  (no       (unknown)  (unknown)  Estimated blood  (units   

 (unknown)



                    date)                         loss (mL): 25 unknown)  

 

           (unknown)  (no       (unknown)  (unknown)  Examination under  (units 

   (unknown)



                    date)                         anesthesia showed unknown)  



                                                  the skin overlying           



                                                  the fourchette and           

 

           (unknown)  (no       (unknown)  (unknown)  Findings:  (units    (unkn

own)



                    date)                                   unknown)  

 

           (unknown)  (no       (unknown)  (unknown)  Kateryna is a  (units    (unk

nown)



                    date)                         17-year-old unknown)  



                                                  nulligravida, LMP           



                                                  in May 2022 who           



                                                  presented in August           

 

           (unknown)  (no       (unknown)  (unknown)  Hospital in  (units    (un

known)



                    date)                         [facility] in unknown)  



                                                  2021.? No           



                                                  records are           



                                                  available for           



                                                  review              

 

           (unknown)  (no       (unknown)  (unknown)  Indications:  (units    (u

nknown)



                    date)                                   unknown)  

 

           (unknown)  (no       (unknown)  (unknown)  [facility]  (units   

 (unknown)



                    date)                         [address] [place] [address] unknown)  



                                                  [place] [address]           

 

           (unknown)  (no       (unknown)  (unknown)  Operation Date:  (units   

 (unknown)



                    date)                         22 13:15 unknown)  

 

           (unknown)  (no       (unknown)  (unknown)  Operative  (units    (unkn

own)



                    date)                         Date/Time/Diagnoses unknown)  

 

           (unknown)  (no       (unknown)  (unknown)  Operative Note  (units    

(unknown)



                    date)                                   unknown)  

 

           (unknown)  (no       (unknown)  (unknown)  Operative Notes  (units   

 (unknown)



                    date)                                   unknown)  

 

           (unknown)  (no       (unknown)  (unknown)  Patient:  (units    (unkno

wn)



                    date)                         Kateryna Jenkins E unknown)  



                                                  MR#: [id number]             

 

           (unknown)  (no       (unknown)  (unknown)  Plan for  (units    (unkno

wn)



                    date)                         aftercare: unknown)  

 

           (unknown)  (no       (unknown)  (unknown)  Post-op diagnosis:  (units

    (unknown)



                    date)                         other ([place]; unknown)  



                                                  Extensive pelvic           



                                                  adhesions, right           



                                                  hydrosalpinx)           

 

           (unknown)  (no       (unknown)  (unknown)  Post-operative  (units    

(unknown)



                    date)                                   unknown)  

 

           (unknown)  (no       (unknown)  (unknown)  Pre-op diagnosis:  (units 

   (unknown)



                    date)                         Chronic right lower unknown)  



                                                  quadrant pain           

 

           (unknown)  (no       (unknown)  (unknown)  Procedure +  (units    (un

known)



                    date)                         Clinicians unknown)  

 

           (unknown)  (no       (unknown)  (unknown)  Procedure in  (units    (u

nknown)



                    date)                         detail:   unknown)  

 

           (unknown)  (no       (unknown)  (unknown)  Procedure:  (units    (unk

nown)



                    date)                                   unknown)  

 

           (unknown)  (no       (unknown)  (unknown)  Procedures  (units    (unk

nown)



                    date)                                   unknown)  

 

           (unknown)  (no       (unknown)  (unknown)  Provider:  (units    (unkn

own)



                    date)                         Jose Almodovar MD unknown)  

 

           (unknown)  (no       (unknown)  (unknown)  Routine postop  (units    

(unknown)



                    date)                         care with plans for unknown)  



                                                  follow-up in 2           



                                                  weeks.              

 

           (unknown)  (no       (unknown)  (unknown)  Signed    (units    (unkno

wn)



                    date)                         By:<Electronically unknown)  



                                                  signed by Jose Almodovar MD>           

 

           (unknown)  (no       (unknown)  (unknown)  Specimen(s): left  (units 

   (unknown)



                    date)                         tube      unknown)  

 

           (unknown)  (no       (unknown)  (unknown)  Surgeon: Jose MUNSON  (units 

   (unknown)



                    date)                         Scooby    unknown)  

 

           (unknown)  (no       (unknown)  (unknown)  Time of procedure:  (units

    (unknown)



                    date)                         14:15     unknown)  

 

           (unknown)  (no       (unknown)  (unknown)  With the patient  (units  

  (unknown)



                    date)                         under satisfactory unknown)  



                                                  general anesthesia           



                                                  in the modified           



                                                  dorsal              

 

           (unknown)  (no       (unknown)  (unknown)  a grasping forcep.  (units

    (unknown)



                    date)                         The PowerSeal was unknown)  



                                                  then used to           



                                                  coagulate and           



                                                  divide the           

 

           (unknown)  (no       (unknown)  (unknown)  a single, small  (units   

 (unknown)



                    date)                         superficial area of unknown)  



                                                  powder burn type           



                                                  endometriosis           



                                                  immediately           

 

           (unknown)  (no       (unknown)  (unknown)  abdominal cavity.  (units 

   (unknown)



                    date)                         Proper placement of unknown)  



                                                  the sleeve was then           



                                                  confirmed           

 

           (unknown)  (no       (unknown)  (unknown)  adhesions and  (units    (

unknown)



                    date)                         right salpingectomy unknown)  



                                                  was performed.           

 

           (unknown)  (no       (unknown)  (unknown)  adhesions in the  (units  

  (unknown)



                    date)                         pelvis.   unknown)  



                                                  Photographic           



                                                  documentation           



                                                  before and after           



                                                  lysis of            

 

           (unknown)  (no       (unknown)  (unknown)  adhesions. The  (units    

(unknown)



                    date)                         fimbria on the left unknown)  



                                                  however was not           



                                                  phimotic or           



                                                  severely scarred           

 

           (unknown)  (no       (unknown)  (unknown)  adhesions. The  (units    

(unknown)



                    date)                         left adnexa was unknown)  



                                                  less affected but           



                                                  also involved with           



                                                  filmy               

 

           (unknown)  (no       (unknown)  (unknown)  and the fallopian  (units 

   (unknown)



                    date)                         tube on the left unknown)  



                                                  while involved with           



                                                  some filmy           



                                                  adhesions did           

 

           (unknown)  (no       (unknown)  (unknown)  and therefore  (units    (

unknown)



                    date)                         discontinue the unknown)  



                                                  oral antibiotics.?           



                                                  As a result she           



                                                  only had            

 

           (unknown)  (no       (unknown)  (unknown)  applied. The  (units    (u

nknown)



                    date)                         patient was unknown)  



                                                  awakened and           



                                                  transferred to the           



                                                  PACU for a period           



                                                  of                  

 

           (unknown)  (no       (unknown)  (unknown)  approximately 1.0  (units 

   (unknown)



                    date)                         cm and its unknown)  



                                                  appearance is           



                                                  consistent with           



                                                  hydrosalpinx. The           

 

           (unknown)  (no       (unknown)  (unknown) approximately 4-5  (units  

  (unknown)



                    date)                         cm and contains a unknown)  



                                                  unilocular, simple           



                                                  follicular cyst           



                                                  which was           

 

           (unknown)  (no       (unknown)  (unknown)  aspect down into  (units  

  (unknown)



                    date)                         the posterior unknown)  



                                                  cul-de-sac. The           



                                                  right ovary is           



                                                  enlarged to           

 

           (unknown)  (no       (unknown)  (unknown)  attention was then  (units

    (unknown)



                    date)                         turned to unknown)  



                                                  performance of the           



                                                  laparoscopy. The           



                                                  umbilicus was           

 

           (unknown)  (no       (unknown)  (unknown)  be a      (units    (unkno

wn)



                    date)                         hyperkeratotic unknown)  



                                                  lesion of the           



                                                  cervix between 9:00           



                                                  and 11:00.           



                                                  Laparoscopic           

 

           (unknown)  (no       (unknown)  (unknown)  bleeding or  (units    (un

known)



                    date)                         remaining unknown)  



                                                  adhesions. Neither           



                                                  was evident and           



                                                  attention was then           

 

           (unknown)  (no       (unknown)  (unknown)  by excision or  (units    

(unknown)



                    date)                         electro   unknown)  



                                                  fulguration. The           



                                                  ovary was freed           



                                                  from the posterior           

 

           (unknown)  (no       (unknown)  (unknown) canal was then  (units    (

unknown)



                    date)                         dilated   unknown)  



                                                  sufficiently to           



                                                  admit a Zumi           



                                                  manipulator which           



                                                  was placed           

 

           (unknown)  (no       (unknown)  (unknown)  cervix. This was  (units  

  (unknown)



                    date)                         destroyed with unknown)  



                                                  electro             



                                                  fulguration. Filmy           



                                                  adhesions involving           

 

           (unknown)  (no       (unknown)  (unknown)  completion of the  (units 

   (unknown)



                    date)                         case the right unknown)  



                                                  ovary was freely           



                                                  mobile and           



                                                  uninvolved with           

 

           (unknown)  (no       (unknown)  (unknown)  considering all  (units   

 (unknown)



                    date)                         options, the unknown)  



                                                  patient is           



                                                  proceeding to           



                                                  diagnostic           



                                                  laparoscopy           

 

           (unknown)  (no       (unknown)  (unknown)  course of surgery  (units 

   (unknown)



                    date)                         following removal unknown)  



                                                  of the right           



                                                  fallopian tube and           



                                                  at the              

 

           (unknown)  (no       (unknown)  (unknown)  cul-de-sac and  (units    

(unknown)



                    date)                         there were no unknown)  



                                                  remaining adhesions           



                                                  evident on the           



                                                  surface of the           

 

           (unknown)  (no       (unknown)  (unknown)  decision was made  (units 

   (unknown)



                    date)                         to remove the tube. unknown)  



                                                  Adhesions around           



                                                  the distal tube           



                                                  were                

 

           (unknown)  (no       (unknown)  (unknown)  endometrial  (units    (un

known)



                    date)                         implants Jose E unknown)  



                                                  MD Scooby           

 

           (unknown)  (no       (unknown)  (unknown)  fallopian tube was  (units

    (unknown)



                    date)                         coagulated and unknown)  



                                                  divided at its base           



                                                  using the           



                                                  PowerSeal. The           

 

           (unknown)  (no       (unknown)  (unknown)  filmy adhesions  (units   

 (unknown)



                    date)                         which were lysed unknown)  



                                                  during the course           



                                                  of the procedure.           



                                                  There was           

 

           (unknown)  (no       (unknown)  (unknown)  fimbria varicose  (units  

  (unknown)



                    date)                         with that unknown)  



                                                  dissection carried           



                                                  carefully           



                                                  underneath the tube           

 

           (unknown)  (no       (unknown)  (unknown)  follow-up from an  (units 

   (unknown)



                    date)                         [facility]           



                                                  [facility] at which           

 

           (unknown)  (no       (unknown)  (unknown)  following IV  (units    (u

nknown)



                    date)                         antibiotics but unknown)  



                                                  apparently had an           



                                                  adverse reaction to           



                                                  Vibramycin           

 

           (unknown)  (no       (unknown)  (unknown)  for any other  (units    (

unknown)



                    date)                         abnormalities or unknown)  



                                                  points of bleeding.           



                                                  None were evident           



                                                  and                 

 

           (unknown)  (no       (unknown)  (unknown)  fully visualized  (units  

  (unknown)



                    date)                         and it was unknown)  



                                                  determined that the           



                                                  tube itself was not           



                                                  only the            

 

           (unknown)  (no       (unknown)  (unknown)  gallbladder is  (units    

(unknown)



                    date)                         visible but unknown)  



                                                  nondistended. Both           



                                                  hemidiaphragms are           



                                                  unremarkable.           

 

           (unknown)  (no       (unknown)  (unknown)  had a cobblestone  (units 

   (unknown)



                    date)                         type of appearance unknown)  



                                                  without apparent           



                                                  focal lesions. In           



                                                  the                 

 

           (unknown)  (no       (unknown)  (unknown)  has had only very  (units 

   (unknown)



                    date)                         irregular cycles unknown)  



                                                  since the TOA or as           



                                                  she had regular           

 

           (unknown)  (no       (unknown)  (unknown)  hemipelvis into  (units   

 (unknown)



                    date)                         the cul-de-sac with unknown)  



                                                  all areas of filmy           



                                                  adhesions either           



                                                  excised             

 

           (unknown)  (no       (unknown)  (unknown)  however is encased  (units

    (unknown)



                    date)                         in adhesions and unknown)  



                                                  the distal tube is           



                                                  convoluted by           



                                                  adhesions           

 

           (unknown)  (no       (unknown)  (unknown)  in the usual  (units    (u

nknown)



                    date)                         manner. The unknown)  



                                                  tenaculum was           



                                                  removed along with           



                                                  the speculum and           

 

           (unknown)  (no       (unknown)  (unknown)  incapacitating  (units    

(unknown)



                    date)                         pain.? She presents unknown)  



                                                  today for her           



                                                  scheduled surgery.           

 

           (unknown)  (no       (unknown)  (unknown)  incisions were  (units    

(unknown)



                    date)                         then closed with unknown)  



                                                  4-0 Monocryl in           



                                                  inverted            



                                                  interrupted           

 

           (unknown)  (no       (unknown)  (unknown)  incompletely  (units    (u

nknown)



                    date)                         treated TOA didn't unknown)  



                                                  provide any           



                                                  improvement in her           



                                                  pain and after           

 

           (unknown)  (no       (unknown)  (unknown) infiltrated with  (units   

 (unknown)



                    date)                         0.5% Marcaine with unknown)  



                                                  epinephrine and a 1           



                                                  cm vertical           



                                                  incision was           

 

           (unknown)  (no       (unknown)  (unknown)  inspection of the  (units 

   (unknown)



                    date)                         upper abdomen is unknown)  



                                                  unremarkable. The           



                                                  liver edges smooth           



                                                  and the             

 

           (unknown)  (no       (unknown)  (unknown)  insufflate the  (units    

(unknown)



                    date)                         abdomen with carbon unknown)  



                                                  dioxide and once           



                                                  sufficiently           



                                                  insufflated, a           

 

           (unknown)  (no       (unknown)  (unknown)  intravenous  (units    (un

known)



                    date)                         antibiotic therapy unknown)  



                                                  and no follow-up           



                                                  oral antibiotic           



                                                  therapy.? Since           

 

           (unknown)  (no       (unknown)  (unknown)  involving the  (units    (

unknown)



                    date)                         cecum. The anterior unknown)  



                                                  cul-de-sac is free           



                                                  of abnormalities.           



                                                  The                 

 

           (unknown)  (no       (unknown)  (unknown)  irrigated with  (units    

(unknown)



                    date)                         sterile saline and unknown)  



                                                  carefully inspected           



                                                  for any evidence of           

 

           (unknown)  (no       (unknown)  (unknown)  laparoscopically  (units  

  (unknown)



                    date)                         and a 2nd and 3rd 5 unknown)  



                                                  mm port was placed           



                                                  in the right and           



                                                  left                

 

           (unknown)  (no       (unknown)  (unknown)  lateral to the  (units    

(unknown)



                    date)                         insertion of the unknown)  



                                                  left uterosacral           



                                                  ligament into the           



                                                  posterior           

 

           (unknown)  (no       (unknown)  (unknown)  left ovary. A  (units    (

unknown)



                    date)                         small area of unknown)  



                                                  superficial           



                                                  endometriosis at           



                                                  the base of the           



                                                  left                

 

           (unknown)  (no       (unknown)  (unknown)  lithotomy  (units    (unkn

own)



                    date)                         position, the unknown)  



                                                  perineum, vagina           



                                                  and abdomen were           



                                                  prepped and draped           



                                                  in                  

 

           (unknown)  (no       (unknown)  (unknown) lysed with a  (units    (un

known)



                    date)                         bipolar PowerSeal unknown)  



                                                  device and the           



                                                  fimbria varicose           



                                                  was elevated with           

 

           (unknown)  (no       (unknown)  (unknown)  made in the skin  (units  

  (unknown)



                    date)                         of the inferior unknown)  



                                                  umbilicus. A Veress           



                                                  needle was then           



                                                  used to             

 

           (unknown)  (no       (unknown)  (unknown)  mid quadrants  (units    (

unknown)



                    date)                         using a similar unknown)  



                                                  technique. Using a           



                                                  3 puncture           



                                                  technique the           

 

           (unknown)  (no       (unknown)  (unknown)  not appear to be  (units  

  (unknown)



                    date)                         consistent with a unknown)  



                                                  hydrosalpinx. No           



                                                  test of tubal           



                                                  patency on           

 

           (unknown)  (no       (unknown)  (unknown)  observation after  (units 

   (unknown)



                    date)                         having tolerated unknown)  



                                                  procedure well.           

 

           (unknown)  (no       (unknown)  (unknown)  of that   (units    (unkno

wn)



                    date)                         hospitalization but unknown)  



                                                  apparently she was           



                                                  treated with IV           



                                                  antibiotics and           

 

           (unknown)  (no       (unknown)  (unknown)  or coagulated with  (units

    (unknown)



                    date)                         bipolar current unknown)  



                                                  using the           



                                                  PowerSeal. The           



                                                  pelvis was then           

 

           (unknown)  (no       (unknown)  (unknown)  ovary.? A two week  (units

    (unknown)



                    date)                         trial of PO AB's unknown)  



                                                  for suspected           



                                                  chronic PID           



                                                  following           

 

           (unknown)  (no       (unknown)  (unknown)  p Laparoscopy with  (units

    (unknown)



                    date)                         poss. Right unknown)  



                                                  Salpingectomy,           



                                                  lysis of adhesions,           



                                                  fulgeration           

 

           (unknown)  (no       (unknown)  (unknown)  pathologic  (units    (unk

nown)



                    date)                         specimen. unknown)  



                                                  Adhesiolysis was           



                                                  then carried out           



                                                  throughout the           



                                                  right               

 

           (unknown)  (no       (unknown)  (unknown)  pelvis and abdomen  (units

    (unknown)



                    date)                         were thoroughly unknown)  



                                                  visualized with the           



                                                  findings as noted           



                                                  above.              

 

           (unknown)  (no       (unknown)  (unknown)  percutaneous  (units    (u

nknown)



                    date)                         drainage of the unknown)  



                                                  abscess.? She was           



                                                  placed on oral           



                                                  antibiotics           

 

           (unknown)  (no       (unknown)  (unknown)  performed with the  (units

    (unknown)



                    date)                         findings as noted unknown)  



                                                  above. A speculum           



                                                  was inserted in the           

 

           (unknown)  (no       (unknown)  (unknown)  performed. The  (units    

(unknown)



                    date)                         pelvis was then unknown)  



                                                  thoroughly           



                                                  irrigated once           



                                                  again and inspected           

 

           (unknown)  (no       (unknown)  (unknown)  perineal body to  (units  

  (unknown)



                    date)                         be somewhat unknown)  



                                                  hyperkeratotic in           



                                                  appearance. The           



                                                  vaginal mucosa           

 

           (unknown)  (no       (unknown)  (unknown)  photographic  (units    (u

nknown)



                    date)                         documentation post unknown)  



                                                  lysis of adhesion           



                                                  was performed. The           

 

           (unknown)  (no       (unknown)  (unknown)  pneumoperitoneum  (units  

  (unknown)



                    date)                         was then vented and unknown)  



                                                  the laparoscopic           



                                                  sleeves removed.           



                                                  The port            

 

           (unknown)  (no       (unknown)  (unknown)  posterior aspect  (units  

  (unknown)



                    date)                         of the uterus were unknown)  



                                                  taken down with the           



                                                  PowerSeal device           



                                                  either              

 

           (unknown)  (no       (unknown)  (unknown)  predictable  (units    (un

known)



                    date)                         periods up until unknown)  



                                                  that time.? She           



                                                  presented in           



                                                  consultation in           

 

           (unknown)  (no       (unknown)  (unknown)  probable cause of  (units 

   (unknown)



                    date)                         the pain the unknown)  



                                                  patient has been           



                                                  experiencing but           



                                                  was beyond           

 

           (unknown)  (no       (unknown)  (unknown)  punctured and  (units    (

unknown)



                    date)                         drained during the unknown)  



                                                  course of the           



                                                  surgery. The right           



                                                  adnexa              

 

           (unknown)  (no       (unknown)  (unknown)  repair due to the  (units 

   (unknown)



                    date)                         damage resulting unknown)  



                                                  from her            



                                                  tubo-ovarian           



                                                  abscess and the           

 

           (unknown)  (no       (unknown)  (unknown)  right ovary itself  (units

    (unknown)



                    date)                         is bound by filmy unknown)  



                                                  adhesions to the           



                                                  right ovarian fossa           



                                                  and                 

 

           (unknown)  (no       (unknown)  (unknown)  subcuticular  (units    (u

nknown)



                    date)                         stitches, skin glue unknown)  



                                                  was applied, and           



                                                  appropriate           



                                                  dressings were           

 

           (unknown)  (no       (unknown)  (unknown) that time she is  (units   

 (unknown)



                    date)                         had persistent unknown)  



                                                  right lower           



                                                  quadrant pain which           



                                                  is noncyclic and           

 

           (unknown)  (no       (unknown)  (unknown)  the left was  (units    (u

nknown)



                    date)                         performed however. unknown)  



                                                  The left ovary was           



                                                  also partially           



                                                  involved with           

 

           (unknown)  (no       (unknown)  (unknown)  the pelvic  (units    (unk

nown)



                    date)                         sidewall. All unknown)  



                                                  adhesions in the           



                                                  right adnexa were           



                                                  lysed during the           

 

           (unknown)  (no       (unknown)  (unknown)  the posterior  (units    (

unknown)



                    date)                         cul-de-sac were unknown)  



                                                  excised and/or           



                                                  destroyed with           



                                                  electro fulguration           

 

           (unknown)  (no       (unknown)  (unknown)  the usual fashion.  (units

    (unknown)



                    date)                         A pre-surgical unknown)  



                                                  safety time-out was           



                                                  then taken in           



                                                  accordance           

 

           (unknown)  (no       (unknown)  (unknown)  throughout its  (units    

(unknown)



                    date)                         length, across the unknown)  



                                                  mesosalpinx, to the           



                                                  cornua where the           



                                                  right               

 

           (unknown)  (no       (unknown)  (unknown)  time she underwent  (units

    (unknown)



                    date)                         abdominal/pelvic CT unknown)  



                                                  and pelvic           



                                                  ultrasound which           



                                                  were largely           

 

           (unknown)  (no       (unknown)  (unknown)  tube was then  (units    (

unknown)



                    date)                         removed through one unknown)  



                                                  of the 5 mm ports           



                                                  and submitted as a           

 

           (unknown)  (no       (unknown)  (unknown)  tubo-ovarian  (units    (u

nknown)



                    date)                         abscess due to unknown)  



                                                  chlamydia treated           



                                                  as an inpatient at           



                                                  [facility]             

 

           (unknown)  (no       (unknown)  (unknown)  turned to the left  (units

    (unknown)



                    date)                         side of the pelvis. unknown)  



                                                  Adhesions involving           



                                                  the tube and the           

 

           (unknown)  (no       (unknown)  (unknown)  unremarkable with  (units 

   (unknown)



                    date)                         the exception of a unknown)  



                                                  3 cm hemorrhagic           



                                                  cyst within the           



                                                  right               

 

           (unknown)  (no       (unknown)  (unknown)  upper vagina the  (units  

  (unknown)



                    date)                         cobblestone unknown)  



                                                  appearance is more           



                                                  pronounced and           



                                                  there appears to           

 

           (unknown)  (no       (unknown)  (unknown)  using bipolar  (units    (

unknown)



                    date)                         current. At the unknown)  



                                                  completion of the           



                                                  case there were no           



                                                  remaining           

 

           (unknown)  (no       (unknown)  (unknown) uterosacral  (units    (unk

nown)



                    date)                         ligament was then unknown)  



                                                  grasped with the           



                                                  PowerSeal and           



                                                  electro fulguration           

 

           (unknown)  (no       (unknown)  (unknown)  uterus is normal  (units  

  (unknown)



                    date)                         sized and mobile unknown)  



                                                  with filmy           



                                                  adhesions involving           



                                                  the posterior           

 

           (unknown)  (no       (unknown)  (unknown)  vagina and a  (units    (u

nknown)



                    date)                         single-tooth unknown)  



                                                  tenaculum was           



                                                  applied to the           



                                                  cervix. The           



                                                  endocervical           

 

           (unknown)  (no       (unknown)  (unknown)  visualized as it  (units  

  (unknown)



                    date)                         appears to be unknown)  



                                                  retrocecal. There           



                                                  were no significant           



                                                  adhesions           

 

           (unknown)  (no       (unknown)  (unknown)  [facility]  (units    (un

known)



                    date)                         [facility] unknown)  



                                                  protocols. An           



                                                  examination under           



                                                  anesthesia was           

 

           (unknown)  (no       (unknown)  (unknown)  with a small  (units    (u

nknown)



                    date)                         amount of residual unknown)  



                                                  fimbria visible but           



                                                  the tube itself           



                                                  dilated to           

 

           (unknown)  (no       (unknown)  (unknown)  with possible  (units    (

unknown)



                    date)                         right     unknown)  



                                                  salpingo-oophorecto           



                                                  my for persistent           



                                                  and at times           







Social History

No information.



Vital Signs

No information.
WDL

## 2023-07-25 NOTE — CONSULT NOTE ADULT - REASON FOR ADMISSION
pneumothorax, colitis, UTI
pneumothorax, colitis, UTI
uti
pneumothorax, colitis, UTI
pneumothorax, colitis, UTI
none

## 2023-09-13 NOTE — ED ADULT NURSE NOTE - NS ED NURSE LEVEL OF CONSCIOUSNESS AFFECT
[facility]  General Surgery Consult Note      Patient: Johnna March Date of Service: 9/13/2023   MRN: [id number] Age: 65 year old       Sex: female   YOB: 1958 CSN: [id number]       Referring Physician:  No ref. provider found    I have evaluated the patient in the ER, reviewed imaging and other studies myself as well as with radiology, discussed with patient and family the various issues in detail.    Chief Complaint:  Chief Complaint   Patient presents with   • Breathing Problem       HISTORY OF PRESENT ILLNESS:  Johnna March is a 65 year old female admitted on 9/11 with COPD exacerbation.  Whom I have been consulted for right upper quadrant pain.  She has COPD and asthma, not on home O2, followed by Dr. Morris's group.  Roughly about 2 weeks ago she developed sinus infection treated by primary care MD with 10 days of antibiotic which she finished 4 or 5 days ago.  Subsequently past Friday she started to have difficulty in breathing then started to have coughing with thick white sputum and with worsening shortness of breath was admitted.  She was placed on Rocephin Doxy steroids and bronchodilators and she is starting to get better.  She remains on nasal cannula oxygen.  For the past year she had been having episodes of right upper quadrant pain.  She prazepam in the right upper quadrant in the midclavicular line below the rib cage as a site of pain no pain in epigastrium no acid reflux no pain radiation to the back.  This episodes are not related to food.  She cannot pretty much associated with anything this pain is sharp and lasts only for a very brief time usually about a minute or so.  States that these pain episodes will come on unexpectedly no associated nausea or emesis normal bowel movements.  Since his episodes had been very brief and intermittent she had been ignoring it.  Morning she had an episode of the same pain again lasted very shortly but was witnessed by  the nursing staff.  Primary service was notified and a right upper quadrant ultrasound was obtained this shows a 5 mm density polyp versus nonmobile stone in the gallbladder without any gallbladder wall thickening distention or pericholecystic fluid.  HIDA scan has been ordered and is pending at this time.  She has been evaluated by GI service and have placed her on PPI.  States that she had similar episode of similar character pain when she had shingles few years ago this then resolved.      MEDICATIONS:  Current Facility-Administered Medications   Medication   • cefTRIAXone (ROCEPHIN) syringe 1,000 mg   • sodium chloride (PF) 0.9 % injection 10 mL   • sodium chloride (PF) 0.9 % injection 10 mL   • [START ON 9/14/2023] methylPREDNISolone (SOLU-Medrol) injection 60 mg   • pantoprazole (PROTONIX) EC tablet 40 mg   • atorvastatin (LIPITOR) tablet 40 mg   • sodium chloride 0.9 % flush bag 25 mL   • Magnesium Standard Replacement Protocol   • Potassium Standard Replacement Protocol (Levels 3.5 and lower)   • Potassium Replacement (Levels 3.6 - 4)   • ondansetron (ZOFRAN) injection 4 mg   • prochlorperazine (COMPAZINE) tablet 5 mg   • acetaminophen (TYLENOL) tablet 650 mg   • polyethylene glycol (MIRALAX) packet 17 g   • magnesium hydroxide (MILK OF MAGNESIA) 400 MG/5ML suspension 30 mL   • aluminum-magnesium hydroxide-simethicone (MAALOX) 200-200-20 MG/5ML suspension 30 mL   • docusate sodium-sennosides (SENOKOT S) 50-8.6 MG 2 tablet   • albuterol inhaler 2 puff   • diclofenac (VOLTAREN) 1 % gel 4 g   • fluticasone (FLONASE) 50 MCG/ACT nasal spray 1 spray   • fluticasone-umeclidin-vilanterol (TRELEGY ELLIPTA) 200-62.5-25 MCG/ACT inhaler 1 puff   • ibuprofen (MOTRIN) tablet 400 mg   • ipratropium-albuterol (DUONEB) 0.5-2.5 (3) MG/3ML nebulizer solution 3 mL   • montelukast (SINGULAIR) tablet 10 mg   • Vitamin E capsule 400 Units   • ascorbic acid (VITAMIN C) tablet 1,000 mg   • cyanocobalamin (Vitamin B-12) tablet 250  mcg   • sodium chloride 0.9 % flush bag 25 mL   • sodium chloride (PF) 0.9 % injection 2 mL   • sodium chloride (NORMAL SALINE) 0.9 % bolus 500 mL   • sodium chloride 0.9 % flush bag 25 mL   • enoxaparin (LOVENOX) injection 40 mg   • nitroGLYCERIN (NITROSTAT) sublingual tablet 0.4 mg   • doxycycline (VIBRAMYCIN) 100 mg in sodium chloride 0.9 % 100 mL IVPB   • albuterol (VENTOLIN) nebulizer 2.5 mg       ALLERGIES:  ALLERGIES:   Allergen Reactions   • Erythromycin HIVES, Other (See Comments) and Fever     redness   • Azithromycin HIVES and Other (See Comments)     redness   • Erythrocin PRURITUS   • Lisinopril Other (See Comments)     Felt like \"passing out\"       PAST MEDICAL HISTORY:  Past Medical History:   Diagnosis Date   • COPD (chronic obstructive pulmonary disease) (CMD)    • Low back pain    • RAD (reactive airway disease)        PAST SURGICAL HISTORY:  Past Surgical History:   Procedure Laterality Date   • Back surgery      spine surgery with metals and screws   • Eye surgery      cataract surgery bilateral   • Lung surgery  2023    Hole in lung, repaired       FAMILY HISTORY:  Family History   Problem Relation Age of Onset   • Dementia/Alzheimers Mother    • Heart disease Father    • Rheumatologic Disease Father        SOCIAL HISTORY:  Social History     Tobacco Use   • Smoking status: Former     Current packs/day: 0.00     Types: Cigarettes     Quit date:      Years since quittin.7   • Smokeless tobacco: Never   Vaping Use   • Vaping Use: never used   Substance Use Topics   • Alcohol use: Yes     Comment: social drinker   • Drug use: Never       Review of Systems:  Constitutional: Negative except as documented in history of present illness.  Eye: Negative except as documented in history of present illness.  Ear/Nose/Mouth/Throat: Negative except as documented in history of present illness.  Cardiovascular: Negative except as documented in history of present illness.  Respiratory: Negative  Appropriate except as documented in history of present illness.  Gastrointestinal: Negative except as documented in history of present illness.  Genitourinary: Negative except as documented in history of present illness.  Musculoskeletal: Negative except as documented in history of present illness.  Integumentary: Negative except as documented in history of present illness.  Hematology/Lymphatics: Negative except as documented in history of present illness.  Neurologic: Negative except as documented in history of present illness.  Endocrine: Negative except as documented in history of present illness.  Allergy/Immunologic: Negative except as documented in history of present illness.  Psychiatric: Negative except as documented in history of present illness.     Vital Signs:  Visit Vitals  /73   Pulse 84   Temp 98.1 °F (36.7 °C) (Oral)   Resp 16   Ht 5' 6\" (1.676 m)   Wt 85.1 kg (187 lb 9.8 oz)   SpO2 92%   BMI 30.28 kg/m²        Physical Exam:  General: No acute distress, awake, appears stated age  Head: Normocephalic, atraumatic  Eyes: Pupils equal, round, and reactive to light, no icterus, EOM intact  ENT: No lymphadenopathy. Secretions thin  Neck: Supple, soft  Chest: No lesions, no ecchymosis, nontender  Lungs: Clear breath sounds, unlabored breathing, no wheeze.  Heart: Sinus rhythm, regular rate, no murmur  Abdomen: Soft, nontender, nondistended, no rebound/guarding, no hernias, tender in a handsbreadth area in the right upper quadrant, which can be narrowed down a spot of point tenderness in the right upper quadrant as the worst  Genitourinary: normal anatomy  Extremities: Warm and well perfused, no swelling  Neurologic: A&Ox3, GCS 15, no gross deficits.  Psychiatric: normal affect and mood      LAB RESULTS:  Admission on 09/11/2023   Component Date Value   • Ventricular Rate EKG/Min* 09/11/2023 82    • Atrial Rate (BPM) 09/11/2023 82    • ID-Interval (MSEC) 09/11/2023 158    • QRS-Interval (MSEC) 09/11/2023 86    •  QT-Interval (MSEC) 09/11/2023 360    • QTc 09/11/2023 420    • P Axis (Degrees) 09/11/2023 20    • R Axis (Degrees) 09/11/2023 56    • T Axis (Degrees) 09/11/2023 60    • REPORT TEXT 09/11/2023                      Value:Normal sinus rhythm  Normal ECG  When compared with ECG of  12-FEB-2023 13:11,  No significant change was found  Confirmed by KYLAH FONTANA, [place] ([id number]) on 9/11/2023 4:58:03 PM     • Extra Tube 09/11/2023 Hold for Add Ons    • Extra Tube 09/11/2023 Hold for Add Ons    • Extra Tube 09/11/2023 Hold for Add Ons    • Sodium 09/11/2023 141    • Potassium 09/11/2023 4.8    • Chloride 09/11/2023 109    • Carbon Dioxide 09/11/2023 28    • Anion Gap 09/11/2023 9    • Glucose 09/11/2023 122 (H)    • BUN 09/11/2023 19    • Creatinine 09/11/2023 0.95    • Glomerular Filtration Ra* 09/11/2023 66    • BUN/Cr 09/11/2023 20    • Calcium 09/11/2023 9.5    • Troponin I, High Sensiti* 09/11/2023 4    • Procalcitonin 09/11/2023 <0.05    • Magnesium 09/11/2023 2.2    • D Dimer, Quantitative 09/11/2023 0.60 (H)    • Rapid SARS-COV-2 by PCR 09/11/2023 Not Detected    • Influenza A by PCR 09/11/2023 Not Detected    • Influenza B by PCR 09/11/2023 Not Detected    • RSV BY PCR 09/11/2023 Not Detected    • Isolation Guidelines 09/11/2023     • Procedural Comment 09/11/2023     • WBC 09/11/2023 5.4    • RBC 09/11/2023 4.64    • HGB 09/11/2023 13.5    • HCT 09/11/2023 43.2    • MCV 09/11/2023 93.1    • MCH 09/11/2023 29.1    • MCHC 09/11/2023 31.3 (L)    • RDW-CV 09/11/2023 13.7    • RDW-SD 09/11/2023 46.2    • PLT 09/11/2023 251    • NRBC 09/11/2023 0    • Neutrophil, Percent 09/11/2023 50    • Lymphocytes, Percent 09/11/2023 35    • Mono, Percent 09/11/2023 8    • Eosinophils, Percent 09/11/2023 6    • Basophils, Percent 09/11/2023 1    • Immature Granulocytes 09/11/2023 0    • Absolute Neutrophils 09/11/2023 2.7    • Absolute Lymphocytes 09/11/2023 1.9    • Absolute Monocytes 09/11/2023 0.4    • Absolute Eosinophils   09/11/2023 0.3    • Absolute Basophils 09/11/2023 0.0    • Absolute Immature Granul* 09/11/2023 0.0    • NT-proBNP 09/11/2023 347 (H)    • Troponin I, High Sensiti* 09/11/2023 4    • Troponin I, High Sensiti* 09/11/2023 <4    • Adenovirus 09/11/2023 Not Detected    • Bocavirus 09/11/2023 Not Detected    • Coronavirus, 229E 09/11/2023 Not Detected    • Coronavirus, HKU1 09/11/2023 Not Detected    • Chlamydophila pneumoniae 09/11/2023 Not Detected    • Coronavirus, NL63 09/11/2023 Not Detected    • Coronavirus, OC43 09/11/2023 Not Detected    • Influenza A, Subtype H1 09/11/2023 Not Detected    • Influenza A, 2009 H1N1 S* 09/11/2023 Not Detected    • Influenza A, Subtype H3 09/11/2023 Not Detected    • Influenza A, Not Subtyped 09/11/2023 Not Applicable    • Influenza B 09/11/2023 Not Detected    • Legionella Pneumophila 09/11/2023 Not Detected    • Metapneumovirus 09/11/2023 Not Detected    • Mycoplasma Pneumoniae 09/11/2023 Not Detected    • Parainfluenza, Type 1 09/11/2023 Not Detected    • Parainfluenza, Type 2 09/11/2023 Not Detected    • Parainfluenza, Type 3 09/11/2023 Not Detected    • Parainfluenza, Type 4 09/11/2023 Not Detected    • Rhinovirus / Enterovirus 09/11/2023 Not Detected    • Respiratory Syncytial Vi* 09/11/2023 Not Detected    • Respiratory Syncytial Vi* 09/11/2023 Not Detected    • Procedural Notes 09/11/2023                      Value:This result contains rich text formatting which cannot be displayed here.   • Magnesium 09/12/2023 2.3    • TSH 09/12/2023 0.372    • Hemoglobin A1C 09/12/2023 5.7 (H)    • Sodium 09/12/2023 141    • Potassium 09/12/2023 4.1    • Chloride 09/12/2023 111 (H)    • Carbon Dioxide 09/12/2023 24    • Anion Gap 09/12/2023 10    • Glucose 09/12/2023 201 (H)    • BUN 09/12/2023 22 (H)    • Creatinine 09/12/2023 0.72    • Glomerular Filtration Ra* 09/12/2023 >90    • BUN/Cr 09/12/2023 31 (H)    • Calcium 09/12/2023 9.1    • Bilirubin, Total 09/12/2023 0.2    • GOT/AST  09/12/2023 10    • GPT/ALT 09/12/2023 18    • Alkaline Phosphatase 09/12/2023 81    • Albumin 09/12/2023 3.1 (L)    • Protein, Total 09/12/2023 5.6 (L)    • Globulin 09/12/2023 2.5    • A/G Ratio 09/12/2023 1.2    • WBC 09/12/2023 6.1    • RBC 09/12/2023 4.07    • HGB 09/12/2023 12.1    • HCT 09/12/2023 37.4    • MCV 09/12/2023 91.9    • MCH 09/12/2023 29.7    • MCHC 09/12/2023 32.4    • RDW-CV 09/12/2023 13.5    • RDW-SD 09/12/2023 45.7    • PLT 09/12/2023 226    • NRBC 09/12/2023 0    • Neutrophil, Percent 09/12/2023 79    • Lymphocytes, Percent 09/12/2023 18    • Mono, Percent 09/12/2023 2    • Eosinophils, Percent 09/12/2023 0    • Basophils, Percent 09/12/2023 0    • Immature Granulocytes 09/12/2023 1    • Absolute Neutrophils 09/12/2023 4.9    • Absolute Lymphocytes 09/12/2023 1.1    • Absolute Monocytes 09/12/2023 0.1 (L)    • Absolute Eosinophils  09/12/2023 0.0    • Absolute Basophils 09/12/2023 0.0    • Absolute Immature Granul* 09/12/2023 0.1    • Sodium 09/13/2023 142    • Potassium 09/13/2023 4.1    • Chloride 09/13/2023 114 (H)    • Carbon Dioxide 09/13/2023 24    • Anion Gap 09/13/2023 8    • Glucose 09/13/2023 153 (H)    • BUN 09/13/2023 27 (H)    • Creatinine 09/13/2023 0.78    • Glomerular Filtration Ra* 09/13/2023 83    • BUN/Cr 09/13/2023 35 (H)    • Calcium 09/13/2023 9.1    • Bilirubin, Total 09/13/2023 0.2    • GOT/AST 09/13/2023 12    • GPT/ALT 09/13/2023 20    • Alkaline Phosphatase 09/13/2023 81    • Albumin 09/13/2023 3.1 (L)    • Protein, Total 09/13/2023 5.8 (L)    • Globulin 09/13/2023 2.7    • A/G Ratio 09/13/2023 1.1    • Magnesium 09/13/2023 2.3    • WBC 09/13/2023 13.0 (H)    • RBC 09/13/2023 4.14    • HGB 09/13/2023 12.2    • HCT 09/13/2023 38.6    • MCV 09/13/2023 93.2    • MCH 09/13/2023 29.5    • MCHC 09/13/2023 31.6 (L)    • RDW-CV 09/13/2023 13.8    • RDW-SD 09/13/2023 47.2    • PLT 09/13/2023 236    • NRBC 09/13/2023 0    • Neutrophil, Percent 09/13/2023 87    • Lymphocytes,  Percent 09/13/2023 9    • Mono, Percent 09/13/2023 3    • Eosinophils, Percent 09/13/2023 0    • Basophils, Percent 09/13/2023 0    • Immature Granulocytes 09/13/2023 1    • Absolute Neutrophils 09/13/2023 11.3 (H)    • Absolute Lymphocytes 09/13/2023 1.2    • Absolute Monocytes 09/13/2023 0.4    • Absolute Eosinophils  09/13/2023 0.0    • Absolute Basophils 09/13/2023 0.0    • Absolute Immature Granul* 09/13/2023 0.1    No results displayed because visit has over 200 results.      Admission on 02/06/2023, Discharged on 02/08/2023   Component Date Value   • Sodium 02/06/2023 139    • Potassium 02/06/2023 4.3    • Chloride 02/06/2023 107    • Carbon Dioxide 02/06/2023 29    • Anion Gap 02/06/2023 7    • Glucose 02/06/2023 89    • BUN 02/06/2023 20    • Creatinine 02/06/2023 0.88    • Glomerular Filtration Ra* 02/06/2023 72    • BUN/Cr 02/06/2023 23    • Calcium 02/06/2023 9.4    • Bilirubin, Total 02/06/2023 0.7    • GOT/AST 02/06/2023 18    • GPT/ALT 02/06/2023 23    • Alkaline Phosphatase 02/06/2023 71    • Albumin 02/06/2023 3.8    • Protein, Total 02/06/2023 6.3 (L)    • Globulin 02/06/2023 2.5    • A/G Ratio 02/06/2023 1.5    • Troponin I, High Sensiti* 02/06/2023 4    • Ventricular Rate EKG/Min* 02/06/2023 78    • Atrial Rate (BPM) 02/06/2023 78    • MT-Interval (MSEC) 02/06/2023 178    • QRS-Interval (MSEC) 02/06/2023 72    • QT-Interval (MSEC) 02/06/2023 376    • QTc 02/06/2023 428    • P Axis (Degrees) 02/06/2023 50    • R Axis (Degrees) 02/06/2023 35    • T Axis (Degrees) 02/06/2023 52    • REPORT TEXT 02/06/2023                      Value:Normal sinus rhythm  Normal ECG  When compared with ECG of  07-MAY-2022 13:03,  No significant change was found  Confirmed by NBA JACOBO MD ([id number]) on 2/6/2023 3:43:10 PM     • WBC 02/06/2023 6.4    • RBC 02/06/2023 4.55    • HGB 02/06/2023 13.8    • HCT 02/06/2023 42.6    • MCV 02/06/2023 93.6    • MCH 02/06/2023 30.3    • MCHC 02/06/2023 32.4    • RDW-CV  02/06/2023 13.9    • RDW-SD 02/06/2023 48.2    • PLT 02/06/2023 215    • NRBC 02/06/2023 0    • Neutrophil, Percent 02/06/2023 63    • Lymphocytes, Percent 02/06/2023 24    • Mono, Percent 02/06/2023 8    • Eosinophils, Percent 02/06/2023 3    • Basophils, Percent 02/06/2023 1    • Immature Granulocytes 02/06/2023 1    • Absolute Neutrophils 02/06/2023 4.1    • Absolute Lymphocytes 02/06/2023 1.5    • Absolute Monocytes 02/06/2023 0.5    • Absolute Eosinophils  02/06/2023 0.2    • Absolute Basophils 02/06/2023 0.1    • Absolute Immature Granul* 02/06/2023 0.0    • D Dimer, Quantitative 02/06/2023 0.72 (H)    • Extra Tube 02/06/2023 Hold for Add Ons    • Troponin I, High Sensiti* 02/06/2023 6    • Magnesium 02/07/2023 2.2    • TSH 02/07/2023 2.970    • Hemoglobin A1C 02/07/2023 5.5    • WBC 02/07/2023 4.1 (L)    • RBC 02/07/2023 4.18    • HGB 02/07/2023 12.4    • HCT 02/07/2023 39.2    • MCV 02/07/2023 93.8    • MCH 02/07/2023 29.7    • MCHC 02/07/2023 31.6 (L)    • RDW-CV 02/07/2023 14.0    • RDW-SD 02/07/2023 47.0    • PLT 02/07/2023 192    • NRBC 02/07/2023 0    • Neutrophil, Percent 02/07/2023 54    • Lymphocytes, Percent 02/07/2023 29    • Mono, Percent 02/07/2023 11    • Eosinophils, Percent 02/07/2023 5    • Basophils, Percent 02/07/2023 1    • Immature Granulocytes 02/07/2023 0    • Absolute Neutrophils 02/07/2023 2.2    • Absolute Lymphocytes 02/07/2023 1.2    • Absolute Monocytes 02/07/2023 0.4    • Absolute Eosinophils  02/07/2023 0.2    • Absolute Basophils 02/07/2023 0.0    • Absolute Immature Granul* 02/07/2023 0.0    • Sodium 02/08/2023 141    • Potassium 02/08/2023 4.1    • Chloride 02/08/2023 109    • Carbon Dioxide 02/08/2023 27    • Anion Gap 02/08/2023 9    • Glucose 02/08/2023 95    • BUN 02/08/2023 21 (H)    • Creatinine 02/08/2023 0.88    • Glomerular Filtration Ra* 02/08/2023 72    • BUN/Cr 02/08/2023 24    • Calcium 02/08/2023 8.7    • Bilirubin, Total 02/08/2023 0.5    • GOT/AST 02/08/2023  13    • GPT/ALT 02/08/2023 18    • Alkaline Phosphatase 02/08/2023 60    • Albumin 02/08/2023 3.1 (L)    • Protein, Total 02/08/2023 5.5 (L)    • Globulin 02/08/2023 2.4    • A/G Ratio 02/08/2023 1.3    • WBC 02/08/2023 4.3    • RBC 02/08/2023 4.08    • HGB 02/08/2023 12.4    • HCT 02/08/2023 38.5    • MCV 02/08/2023 94.4    • MCH 02/08/2023 30.4    • MCHC 02/08/2023 32.2    • RDW-CV 02/08/2023 13.9    • RDW-SD 02/08/2023 47.7    • PLT 02/08/2023 177    • NRBC 02/08/2023 0    • Neutrophil, Percent 02/08/2023 53    • Lymphocytes, Percent 02/08/2023 31    • Mono, Percent 02/08/2023 10    • Eosinophils, Percent 02/08/2023 5    • Basophils, Percent 02/08/2023 1    • Immature Granulocytes 02/08/2023 0    • Absolute Neutrophils 02/08/2023 2.3    • Absolute Lymphocytes 02/08/2023 1.3    • Absolute Monocytes 02/08/2023 0.4    • Absolute Eosinophils  02/08/2023 0.2    • Absolute Basophils 02/08/2023 0.0    • Absolute Immature Granul* 02/08/2023 0.0    Admission on 05/07/2022, Discharged on 05/07/2022   Component Date Value   • Sodium 05/07/2022 140    • Potassium 05/07/2022 4.2    • Chloride 05/07/2022 106    • Carbon Dioxide 05/07/2022 29    • Anion Gap 05/07/2022 9 (L)    • Glucose 05/07/2022 89    • BUN 05/07/2022 25 (H)    • Creatinine 05/07/2022 1.50 (H)    • Glomerular Filtration Ra* 05/07/2022 38 (L)    • BUN/Cr 05/07/2022 17    • Calcium 05/07/2022 10.1    • Ventricular Rate EKG/Min* 05/07/2022 71    • Atrial Rate (BPM) 05/07/2022 71    • FL-Interval (MSEC) 05/07/2022 172    • QRS-Interval (MSEC) 05/07/2022 80    • QT-Interval (MSEC) 05/07/2022 374    • QTc 05/07/2022 406    • P Axis (Degrees) 05/07/2022 62    • R Axis (Degrees) 05/07/2022 70    • T Axis (Degrees) 05/07/2022 67    • REPORT TEXT 05/07/2022                      Value:Normal sinus rhythm  with sinus arrhythmia  Normal ECG  When compared with ECG of  26-JUL-2021 16:30,  Vent. rate  has decreased  BY  38 BPM  Confirmed by PAVITHRA COLBY MD [id number]) on  5/7/2022 11:07:38 PM     • Troponin I, High Sensiti* 05/07/2022 10    • Rapid SARS-COV-2 by PCR 05/07/2022 Not Detected    • Influenza A by PCR 05/07/2022 Not Detected    • Influenza B by PCR 05/07/2022 Not Detected    • RSV BY PCR 05/07/2022 Not Detected    • Isolation Guidelines 05/07/2022     • Procedural Comment 05/07/2022     • WBC 05/07/2022 9.5    • RBC 05/07/2022 4.20    • HGB 05/07/2022 12.7    • HCT 05/07/2022 39.4    • MCV 05/07/2022 93.8    • MCH 05/07/2022 30.2    • MCHC 05/07/2022 32.2    • RDW-CV 05/07/2022 13.1    • RDW-SD 05/07/2022 44.8    • PLT 05/07/2022 231    • NRBC 05/07/2022 0    • Neutrophil, Percent 05/07/2022 71    • Lymphocytes, Percent 05/07/2022 17    • Mono, Percent 05/07/2022 10    • Eosinophils, Percent 05/07/2022 0    • Basophils, Percent 05/07/2022 1    • Immature Granulocytes 05/07/2022 1    • Absolute Neutrophils 05/07/2022 6.7    • Absolute Lymphocytes 05/07/2022 1.6    • Absolute Monocytes 05/07/2022 1.0 (H)    • Absolute Eosinophils  05/07/2022 0.0    • Absolute Basophils 05/07/2022 0.1    • Absolute Immature Granul* 05/07/2022 0.1    • Extra Tube 05/07/2022 Hold for Add Ons    • Extra Tube 05/07/2022 Hold for Add Ons    • Extra Tube 05/07/2022 Hold for Add Ons    Hospital Outpatient Visit on 01/06/2022   Component Date Value   • Immunoglobulin E 01/06/2022 9.8    • Allergen: Alternaria Ten* 01/06/2022 <0.35    • Alternaria Tenuis Class 01/06/2022 0    • Allergen: Ade Albica* 01/06/2022 <0.35    • Candida Albicans Class 01/06/2022 0    • Allergen: Mucor Racemosu* 01/06/2022 <0.35    • Mucor Racemosus Class 01/06/2022 0    • Allergen: Aspergillus Fu* 01/06/2022 <0.35    • Aspergillus Fumigatus, C* 01/06/2022 0    • Allergen: Cladosporium H* 01/06/2022 <0.35    • Cladosporium Herbarium C* 01/06/2022 0    • Allergen: Cat Dander, IgE 01/06/2022 <0.35    • Cat Dander Class 01/06/2022 0    • Allergen: Dermatophagoid* 01/06/2022 <0.35    • Dermatophagoides Pterony* 01/06/2022 0     • Allergen: Dermatophagoid* 01/06/2022 <0.35    • Dermatophagoides Farinae* 01/06/2022 0    • Allergen: Dog Dander, IgE 01/06/2022 <0.35    • Dog Dander Class 01/06/2022 0    • WBC 01/06/2022 5.1    • RBC 01/06/2022 4.50    • HGB 01/06/2022 12.9    • HCT 01/06/2022 41.7    • MCV 01/06/2022 92.7    • MCH 01/06/2022 28.7    • MCHC 01/06/2022 30.9 (L)    • RDW-CV 01/06/2022 15.0    • RDW-SD 01/06/2022 50.9 (H)    • PLT 01/06/2022 226    • NRBC 01/06/2022 0    • Neutrophil, Percent 01/06/2022 53    • Lymphocytes, Percent 01/06/2022 32    • Mono, Percent 01/06/2022 9    • Eosinophils, Percent 01/06/2022 5    • Basophils, Percent 01/06/2022 1    • Immature Granulocytes 01/06/2022 0    • Absolute Neutrophils 01/06/2022 2.7    • Absolute Lymphocytes 01/06/2022 1.6    • Absolute Monocytes 01/06/2022 0.5    • Absolute Eosinophils  01/06/2022 0.2    • Absolute Basophils 01/06/2022 0.0    • Absolute Immature Granul* 01/06/2022 0.0        Imaging: US LIVER GALLBLADDER PANCREAS (RUQ)    Result Date: 9/13/2023  Narrative: EXAM: US LIVER GALLBLADDER PANCREAS (RUQ) INDICATION: . right upper quadrant abd pain COMPARISON: CT chest 9/11/2023 TECHNIQUE: Grayscale and color Doppler imaging of the gallbladder was performed.  FINDINGS: Diffuse increased echogenicity compatible steatosis. Liver measures 15.5 cm. No space-occupying hepatic mass. There is a nonshadowing echogenic structure in the gallbladder which does not move during the course of the exam, measuring 5 mm. There is no separate mobile cholelithiasis. No wall thickening or pericholecystic fluid. CBD measures 5 mm. The visible portion of the pancreas appears normal, however portions of the body and tail are obscured by overlying bowel gas. There is no hydronephrosis of the visible portion of the right kidney. The left kidney, spleen, aorta, and IVC were not directly interrogated.     Impression: 1. Echogenic 5 mm nonshadowing structure in the gallbladder suggestive of either  none mobile calculus or polyp. 2. No secondary evidence to suggest cholecystitis. No biliary ductal dilatation. 3. Hepatic steatosis Electronically Signed by: EARNESTINE AGUAYO MD Signed on: 9/13/2023 9:47 AM Workstation ID: [id number]    CTA CHEST PULMONARY EMBOLISM    Result Date: 9/11/2023  Narrative: EXAM: CTA CHEST PULMONARY EMBOLISM CLINICAL INDICATION: Shortness of breath COMPARISON: CT chest 6/8/2023. TECHNIQUE: Helical CT of the chest was performed after administration of intravenous contrast material using a pulmonary embolism protocol.  1.25 mm thick axial sections.  Post-processing was performed on the acquisition workstation with 3D maximum intensity projection (MIP) images produced. Automated exposure control utilized. FINDINGS: VASCULATURE: Examination diagnostic for assessment of PE. No visible pulmonary arterial thrombus or attenuation.   AORTA: No aneurysm or dissection. Moderate aortic and branch vessel atherosclerotic calcification. LUNGS: Trachea and central airways are patent. Mild to moderate emphysema. Previously described groundglass nodule within the right upper lobe has resolved. No new or suspicious pulmonary lesion is identified. Surgical suture material in the left upper lung unchanged. There is no increasing nodularity or suspicious mass. Bibasilar atelectasis PLEURA: No mass or thickening.  MELVIN: Stable borderline right hilar lymph node (axial image 65) measuring 8.8 mm. No left hilar adenopathy MEDIASTINUM:    Stable right precarinal lymph node measuring 1.4 cm. No new or progressive mediastinal adenopathy. Small paratracheal lymph nodes are seen CARDIAC:    No cardiomegaly or thickening. No effusion. Coronary artery calcifications coronary artery calcifications. CHEST WALL: No adenopathy.  LIMITED ABDOMEN:    No incidental findings in the imaged upper abdomen. BONES: No bony lesion or fracture.   Degenerative changes of the thoracic spine. OTHER: Negative.       Impression: 1. No  evidence of PE. 2. Moderate emphysema with foci of scarring. 3. Previously described right upper lobe groundglass/subsolid nodule has resolved. No new or dominant nodule. 4. Stable borderline nonspecific mediastinal and right hilar nodes. No new or progressive adenopathy. 5. Additional findings as above. Electronically Signed by: EARNESTINE AGUAYO MD Signed on: 9/11/2023 12:37 PM Workstation ID: [id number]      Assessment/Plan:  This is a 65 year old year-old female admitted with COPD exacerbation   1 year history of very brief episodes of sharp pain in right upper quadrant with point tenderness, no associated symptoms otherwise, WBC 13 likely related to her bronchitis and COPD, on antibiotics, normal LFT, ultrasound of the gallbladder with hepatic steatosis, with a 5 mmpolyp versus known mobile stone.  Scan of the abdomen pelvis done in February of this year when she had a lung procedure followed by subcutaneous emphysema.  CT abdomen was unremarkable at that time.  HIDA scan pending.  Similar character pain when she had shingles years ago.  It is well possible that this is anterior cutaneous nerve entrapment syndrome.  Will be unable to rule out sludge or microlithiasis of gallbladder and biliary colic or biliary dyskinesia as etiology of pain although history is a little atypical GI following and have started on PPI.  Will await HIDA scan.  I will probably not consider cholecystectomy until alternate etiologies are ruled out including possible EGD perhaps colonoscopy etc.  Currently her pain is more a nuisance than a real issue requiring urgent intervention.  Will await her COPD exacerbation and bronchitis appears to be resolved and we can address any surgical intervention if needed on an outpatient basis.      Anish Nova M.D., FACS, FRCS.  Trauma/ General Surgery/Critical care Services  [facility]

## 2024-03-18 NOTE — DISCHARGE NOTE ADULT - THE PATIENT HAS
General: Appears Uncomfortable  Mentation: AAO x 3  psych: mood appropriate  HEENT: airway patent, conjunctivae clear bilaterally  Resp: Tachypneic, respiratory distress  Cardio: tachycardic  GI: soft/nondistended/nontender  Neuro: sensation and motor function grossly intact  Skin: no cyanosis, no jaundice  MSK: normal movement of all extremities  Lymph/Vasc: no LE edema no difficulties

## 2024-05-30 NOTE — PROGRESS NOTE ADULT - SUBJECTIVE AND OBJECTIVE BOX
Patient is a 59y old  Female who presents with a chief complaint of pneumothorax, colitis, UTI (13 Jan 2020 15:36)      FROM ADMISSION H+P:   HPI:  59F with PMH of interstitial lung disease hx of pneumothorax (on 2L NC at home), hx pulmonary nodules, Meniere's disease, Non-Hodgkin's lymphoma (SCD/XRT/chemo at [facility] 2003), hx of CVA (3/2019 - residual L sided weakness, unable to use her L arm), not on ASA or Plavix due to GIB, Seizure disorder, tachycardia, MVP, hx of chronic SDH, presents with weakness, weight loss, nausea, and diarrhea for the past month. Patient admits to 4 lb weight loss in 3 weeks, was seen by her PCP and started on Zofran one week ago. Patient reports going to wound care prior to admission for bed sores, and was advised ED evaluation. Per  at bedside, patient has been having loose BMs several times a day with foul smelling urine for 4 days. Denies fever. Patient also has felt too weak to get out of bed and started using a diaper to urinate/have BMs. Has mainly been wheelchair bound recently due to weakness, but at baseline patient is able to stand and walk short distances. Patient uses 2L NC at home and noticed her SpO2 at 75% on RA with ambulation. Patient was also switched to Oxcarbazepine 2-3 months ago and noticed her symptoms of anxiety, weakness, and "memory" have worsened.  Of note, at night when patient takes her Oxcarbazepine, she starts yelling and having "hallucinations."    ----  INTERVAL HPI/OVERNIGHT EVENTS: Pt seen and evaluated at the bedside. No acute overnight events occurred. Improving O2 requirements    ----  PAST MEDICAL & SURGICAL HISTORY:  Interstitial lung disease: on home o2 prn  NHL (non-Hodgkin's lymphoma): Agem 45 sp chemo/rt/stem cell  Transient cerebral ischemia, unspecified type  Mitral prolapse  History of tonsillectomy  History of appendectomy      FAMILY HISTORY:  Family history of stroke  Family history of breast cancer: Mother      Allergies    IV Contrast (Anaphylaxis)  shellfish. (Anaphylaxis)    Intolerances        ----  REVIEW OF SYSTEMS:  unable to obtain full 10 system ROS as pt is on precedex and is an unreliable historian    ----  PHYSICAL EXAM:  GENERAL: patient appears chronically ill and debilitated, speech is slurred, profoundly weak, unreliable historian  ENMT: oropharynx clear without erythema, moist mucous membranes  NECK: supple, soft, no thyromegaly noted  LUNGS: coarse rhonchi auscultated throughout mid chest b/l but L>R . R sided CT in place now without any drainage  HEART: soft S1/S2, regular rate and rhythm, no murmurs noted, no noted edema to b/l LE  GASTROINTESTINAL: abdomen is soft, nontender, minimally distended, active bowel sounds, no palpable masses  INTEGUMENT: good skin turgor, appropriate for ethnicity, appears well perfused, no jaundice noted  MUSCULOSKELETAL: no clubbing or cyanosis, no obvious deformity  NEUROLOGIC: awake, alert, oriented but declining to answer orientation questions, good muscle tone in 3 extremities, LUE wrist is contracted w/ brace in place    T(C): 36.4 (01-13-20 @ 03:46), Max: 36.5 (01-13-20 @ 00:21)  HR: 89 (01-13-20 @ 15:00) (74 - 97)  BP: 150/79 (01-13-20 @ 15:00) (86/54 - 150/79)  RR: 34 (01-13-20 @ 15:00) (22 - 41)  SpO2: 95% (01-13-20 @ 15:00) (83% - 99%)  Wt(kg): --    ----  I&O's Summary    12 Jan 2020 07:01  -  13 Jan 2020 07:00  --------------------------------------------------------  IN: 3364.6 mL / OUT: 0 mL / NET: 3364.6 mL    13 Jan 2020 07:01  -  13 Jan 2020 17:20  --------------------------------------------------------  IN: 760.8 mL / OUT: 120 mL / NET: 640.8 mL        LABS:                        9.6    12.57 )-----------( 542      ( 13 Jan 2020 05:28 )             30.6     01-13    138  |  97  |  9   ----------------------------<  165<H>  3.9   |  37<H>  |  0.41<L>    Ca    8.7      13 Jan 2020 05:28  Phos  2.6     01-13  Mg     2.1     01-13    TPro  5.7<L>  /  Alb  2.3<L>  /  TBili  0.2  /  DBili  <.10  /  AST  27  /  ALT  26  /  AlkPhos  91  01-13        CAPILLARY BLOOD GLUCOSE                    ----  Personally reviewed:  Vital sign trends: [  ] yes    [  ] no     [  ] n/a  Laboratory results: [  ] yes    [  ] no     [  ] n/a  Radiology results: [  ] yes    [  ] no     [  ] n/a  Culture results: [  ] yes    [  ] no     [  ] n/a  Consultant recommendations: [  ] yes    [  ] no     [  ] n/a 69.9 Patient is a 59y old  Female who presents with a chief complaint of pneumothorax, colitis, UTI (13 Jan 2020 15:36)      FROM ADMISSION H+P:   HPI:  59F with PMH of interstitial lung disease hx of pneumothorax (on 2L NC at home), hx pulmonary nodules, Meniere's disease, Non-Hodgkin's lymphoma (SCD/XRT/chemo at [facility] 2003), hx of CVA (3/2019 - residual L sided weakness, unable to use her L arm), not on ASA or Plavix due to GIB, Seizure disorder, tachycardia, MVP, hx of chronic SDH, presents with weakness, weight loss, nausea, and diarrhea for the past month. Patient admits to 4 lb weight loss in 3 weeks, was seen by her PCP and started on Zofran one week ago. Patient reports going to wound care prior to admission for bed sores, and was advised ED evaluation. Per  at bedside, patient has been having loose BMs several times a day with foul smelling urine for 4 days. Denies fever. Patient also has felt too weak to get out of bed and started using a diaper to urinate/have BMs. Has mainly been wheelchair bound recently due to weakness, but at baseline patient is able to stand and walk short distances. Patient uses 2L NC at home and noticed her SpO2 at 75% on RA with ambulation. Patient was also switched to Oxcarbazepine 2-3 months ago and noticed her symptoms of anxiety, weakness, and "memory" have worsened.  Of note, at night when patient takes her Oxcarbazepine, she starts yelling and having "hallucinations."    ----  INTERVAL HPI/OVERNIGHT EVENTS: Pt seen and evaluated at the bedside. No acute overnight events occurred. Improving O2 requirements. Remains on levophed and precedex. Unable to provide any reliable history at this time. Denies pain. Admits feeling "more comfortable" and overall states she feels "better". Still intermittently experiencing hallucinations. Mumbles and moans throughout the day. Spouse @ bedside today.     ----  PAST MEDICAL & SURGICAL HISTORY:  Interstitial lung disease: on home o2 prn  NHL (non-Hodgkin's lymphoma): Agem 43 sp chemo/rt/stem cell  Transient cerebral ischemia, unspecified type  Mitral prolapse  History of tonsillectomy  History of appendectomy      FAMILY HISTORY:  Family history of stroke  Family history of breast cancer: Mother      Allergies    IV Contrast (Anaphylaxis)  shellfish. (Anaphylaxis)    Intolerances        ----  REVIEW OF SYSTEMS:  unable to obtain full 10 system ROS as pt is on precedex and is an unreliable historian    ----  PHYSICAL EXAM:  GENERAL: patient appears chronically ill and debilitated, speech is slurred, weak, limited historian  ENMT: oropharynx clear without erythema, moist mucous membranes  NECK: supple, soft, no thyromegaly noted  LUNGS: coarse rhonchi auscultated throughout mid chest b/l but L>R . R sided CT in place now without any drainage  HEART: soft S1/S2, regular rate and rhythm, no murmurs noted, no noted edema to b/l LE  GASTROINTESTINAL: abdomen is soft, nontender, minimally distended, active bowel sounds, no palpable masses  INTEGUMENT: good skin turgor, appropriate for ethnicity, appears well perfused, no jaundice noted  MUSCULOSKELETAL: no clubbing or cyanosis, no obvious deformity  NEUROLOGIC: awake, alert, oriented but declining to answer orientation questions, good muscle tone in 3 extremities, LUE wrist is contracted. brace was removed.     T(C): 36.4 (01-13-20 @ 03:46), Max: 36.5 (01-13-20 @ 00:21)  HR: 89 (01-13-20 @ 15:00) (74 - 97)  BP: 150/79 (01-13-20 @ 15:00) (86/54 - 150/79)  RR: 34 (01-13-20 @ 15:00) (22 - 41)  SpO2: 95% (01-13-20 @ 15:00) (83% - 99%)  Wt(kg): --    ----  I&O's Summary    12 Jan 2020 07:01  -  13 Jan 2020 07:00  --------------------------------------------------------  IN: 3364.6 mL / OUT: 0 mL / NET: 3364.6 mL    13 Jan 2020 07:01  -  13 Jan 2020 17:20  --------------------------------------------------------  IN: 760.8 mL / OUT: 120 mL / NET: 640.8 mL        LABS:                        9.6    12.57 )-----------( 542      ( 13 Jan 2020 05:28 )             30.6     01-13    138  |  97  |  9   ----------------------------<  165<H>  3.9   |  37<H>  |  0.41<L>    Ca    8.7      13 Jan 2020 05:28  Phos  2.6     01-13  Mg     2.1     01-13    TPro  5.7<L>  /  Alb  2.3<L>  /  TBili  0.2  /  DBili  <.10  /  AST  27  /  ALT  26  /  AlkPhos  91  01-13        CAPILLARY BLOOD GLUCOSE                    ----  Personally reviewed:  Vital sign trends: [ x ] yes    [  ] no     [  ] n/a  Laboratory results: [ x ] yes    [  ] no     [  ] n/a  Radiology results: [  ] yes    [  ] no     [ x ] n/a  Culture results: [  ] yes    [  ] no     [ x ] n/a  Consultant recommendations: [ x ] yes    [  ] no     [  ] n/a

## 2024-06-27 NOTE — PROGRESS NOTE ADULT - ATTENDING COMMENTS
colonoscopy in 10 years.    Loida Martinez MD, MD   [facility]  6/27/2024      59F, hx interstitial lung dz/multiple pneumothoraces/2L home O2, hx pulm nodules, hx hepatic lesions, Meneire's dz, NHL/chemotherapy, hx CVA 3/2019/residual R weakness, hx seizures, hx GIB - mgmt for R pnthx assoc w/lung trapping - managed conservatively w/suppl O2, close monitoring w/serial CXRs as per Pulm recomms; also stercolitis on IV abxs, +UTI - being optimized  -pneumothorax - CXR shows decreasing size - repeat CXR for today pending; continue suppl O2 - pt on 2L home oxygen at baseline  -UTI - continue ceftriaxone  -hx stercoitis - continue flagyl  -seizures - on trileptal  -hx hepatic lesions - noted on CTAP - also present on previous CTAP 4/2019 - clarify w/pt re prior f/u and management  -dvt prophy  -PT mobilization once resp status more stabilized

## 2025-04-02 NOTE — ED ADULT NURSE NOTE - URINE CHARACTERISTICS
57 y.o. female presents for evaluation of laceration to top left scalp that occurred just prior to arrival when she hit her head on a tool box. Denies LOC, headache, dizziness, n/v or any other associated symptoms. Is not on blood thinners. Tetanus is not up to date. No other complaints.      Vitals:    25 1246   BP: 155/81   Pulse: 89   Resp: 20   Temp: 36.7 °C (98.1 °F)   SpO2: 96%       Allergies   Allergen Reactions    Pollen Extracts Cough       Medication Documentation Review Audit       Reviewed by Mary Jo Rodriguez MA (Medical Assistant) on 25 at 1245      Medication Order Taking? Sig Documenting Provider Last Dose Status   ammonium lactate (Amlactin) 12 % cream [id number] Yes Apply 1 Application topically if needed. Historical Provider, MD Taking Active   lisinopril 10 mg tablet [id number] Yes Take 1 tablet (10 mg) by mouth once daily. Patti Sue MD  Active   metoprolol succinate XL (Toprol-XL) 100 mg 24 hr tablet [id number] Yes Take 1 tablet (100 mg) by mouth once daily. Do not crush or chew. Patti Sue MD  Active   multivitamin tablet [id number] Yes Take 1 tablet by mouth once daily. Historical Provider, MD Taking Active   triamcinolone (Kenalog) 0.1 % ointment [id number] Yes Apply 1 Application topically 2 times a day. Apply and gentaly massage into affected area(s) twice daily Historical Provider, MD Taking Active                    Past Medical History:   Diagnosis Date    Essential (primary) hypertension 2022    HTN (hypertension)    Nonrheumatic mitral (valve) prolapse 2020    MVP (mitral valve prolapse)    Personal history of other diseases of the circulatory system 2020    History of vasculitis       Past Surgical History:   Procedure Laterality Date    OTHER SURGICAL HISTORY  2019     section       ROS  See HPI    Physical Exam  Vitals and nursing note reviewed.   Constitutional:       Appearance: Normal appearance.   HENT:      Head: Normocephalic and  atraumatic.   Skin:     Findings: Laceration (left scalp 2 cm linear laceration) present.          Neurological:      General: No focal deficit present.      Mental Status: She is alert and oriented to person, place, and time.   Psychiatric:         Mood and Affect: Mood normal.         Behavior: Behavior normal.       procedure:   Laceration Repair        Laceration Total length =  2.0 cm  After evaluation, I decided that the laceration did need to be repaired. I fully cleansed and irrigated the wound with betasept & normal saline solution.   I then evaluated and explored the wound for any abnormalities, such as foreign body, infection.  Seeing none, I then proceeded to repair the laceration, utilizing staples and placed a total of 4 staples  Patient tolerated the procedure well.  Wound care discussed with patient. Patient was informed to have a wound check in about 2 days PRN, and his staples out in about 10days. He is to return to our  or go to ED at any point if he has any concerns, including infection, or any other issues that we discussed.       Assessment/Plan/MDM  Theo was seen today for head laceration.  Diagnoses and all orders for this visit:  Laceration of scalp without foreign body, initial encounter (Primary)  -     Tdap vaccine, age 10 years and older  (BOOSTRIX)  -     Laceration repair    Wound care discussed with patient. Tdap booster given, tolerated well, discharged 15 min post injection in stable condition.  Patient's clinical presentation is otherwise unremarkable at this time. Patient is discharged with instructions to follow-up with primary care or seek emergency medical attention for worsening symptoms or any new concerns.    I did personally review Theo's past medical history, surgical history, social history, as well as family history (when relevant).  In this case, I also oversaw the her drug management by reviewing her medication list, allergy list, as well as the medications that I  prescribed during the UC course and/or recommended as an out-patient (including possible OTC medications such as acetaminophen, NSAIDs , etc).    After reviewing the items above, I did look at previous medical documentation, such as recent hospitalizations, office visits, and/or recent consultations with PCP/specialist.                          SDOH:   Another factor that I considered in Theo's care was her Social Determinants of Health (SDOH). During this UC encounter, she did not have social determinants of health. Those SDOH influencing Theo's care are: none      Dennis Beauchamp CNP  [facility]  [phone]   concentrated

## 2025-05-07 NOTE — PROGRESS NOTE ADULT - PROBLEM SELECTOR PROBLEM 5
M Health Mont Belvieu 4/6/2020     8:27 AM 10/19/2020     7:30 AM 10/29/2021    10:55 AM 4/22/2022     9:37 14 AM 3/17/2025     6:49 AM 4/14/2025     1:10 PM   [place] Suicide Severity Rating (Lifetime/Recent)   Q1 Wished to be Dead (Past Month) 0-->no  0-->no    Q2 Suicidal Thoughts (Past Month) 0-->no  0-->no    Q6 Suicide Behavior (Lifetime) 0-->no  0-->no    Level of Risk per Screen no risks indicated  no risks indicated    1. Wish to be Dead (Lifetime)  N  Y   Wish to be Dead Description (Lifetime)    After 's death Teena Le  YOB: 1974    Date of Creation: May 7, 
Transient cerebral ischemia, unspecified type
Transient cerebral ischemia, unspecified type
Transient cerebral ischemia, unspecified type

## 2025-06-03 NOTE — PATIENT PROFILE ADULT - NSPROEDALEARNPREF_GEN_A_NUR
See phone message from call center Right wrist FX DOI Fall yesterday 6-2-25 XR done 6-3-25 [facility].    I called & informed pt that Surgeon  reviewed XR & stated NONOP treatment with casting for 6 weeks with weekly appts for the first 3 weeks to monitor it.  Ordered if develops any Displacement  then Sports provider to notify Hand Staff by staff message in epic.  Pt agreed & we made appts.  Advised icing & elevating & continue wearing brace.  Call back prn.  Provided all our contact info & address.  W.O.V.O.R.B./Lidia Martinez RN.       
What is the Concern:  Fracture  Date the concern started: 6/2/25  Injury related? yes  Is this related to recent surgery?no  Laceration?  No  Body part affected:  (R) wrist fracture  Has Patient been evaluated for condition? yes  Location the patient was evaluated and treated for the condition?   [facility], [facility] [place]  Who is referring provider, (name and clinic location) Dwain Pimentel PA-C in  [facility]   What images have been done? X-Ray; Location and City where images were taken: [place]    Could we send this information to you in [place] [facility] [employer] or would you prefer to receive a phone call?:   Patient would prefer a phone call   Okay to leave a detailed message?: Yes at Home number on file [phone] (home)    
skill demonstration/written material